# Patient Record
Sex: FEMALE | Race: ASIAN | NOT HISPANIC OR LATINO | URBAN - METROPOLITAN AREA
[De-identification: names, ages, dates, MRNs, and addresses within clinical notes are randomized per-mention and may not be internally consistent; named-entity substitution may affect disease eponyms.]

---

## 2017-01-17 ENCOUNTER — INPATIENT (INPATIENT)
Facility: HOSPITAL | Age: 57
LOS: 9 days | Discharge: HOME | DRG: 603 | End: 2017-01-27
Attending: HOSPITALIST | Admitting: HOSPITALIST
Payer: MEDICAID

## 2017-01-17 VITALS
HEART RATE: 82 BPM | RESPIRATION RATE: 18 BRPM | TEMPERATURE: 98 F | OXYGEN SATURATION: 95 % | DIASTOLIC BLOOD PRESSURE: 84 MMHG | SYSTOLIC BLOOD PRESSURE: 129 MMHG

## 2017-01-17 DIAGNOSIS — L03.115 CELLULITIS OF RIGHT LOWER LIMB: ICD-10-CM

## 2017-01-17 LAB
ALBUMIN SERPL ELPH-MCNC: 3.5 G/DL — SIGNIFICANT CHANGE UP (ref 3.3–5)
ALP SERPL-CCNC: 187 U/L — HIGH (ref 40–120)
ALT FLD-CCNC: 9 U/L RC — LOW (ref 10–45)
ANION GAP SERPL CALC-SCNC: 14 MMOL/L — SIGNIFICANT CHANGE UP (ref 5–17)
ANION GAP SERPL CALC-SCNC: 15 MMOL/L — SIGNIFICANT CHANGE UP (ref 5–17)
AST SERPL-CCNC: 13 U/L — SIGNIFICANT CHANGE UP (ref 10–40)
BASE EXCESS BLDV CALC-SCNC: -6.1 MMOL/L — LOW (ref -2–2)
BASOPHILS # BLD AUTO: 0.1 K/UL — SIGNIFICANT CHANGE UP (ref 0–0.2)
BASOPHILS NFR BLD AUTO: 1 % — SIGNIFICANT CHANGE UP (ref 0–2)
BILIRUB SERPL-MCNC: 0.8 MG/DL — SIGNIFICANT CHANGE UP (ref 0.2–1.2)
BUN SERPL-MCNC: 49 MG/DL — HIGH (ref 7–23)
BUN SERPL-MCNC: 49 MG/DL — HIGH (ref 7–23)
CA-I SERPL-SCNC: 1.16 MMOL/L — SIGNIFICANT CHANGE UP (ref 1.12–1.3)
CALCIUM SERPL-MCNC: 8.4 MG/DL — SIGNIFICANT CHANGE UP (ref 8.4–10.5)
CALCIUM SERPL-MCNC: 8.5 MG/DL — SIGNIFICANT CHANGE UP (ref 8.4–10.5)
CHLORIDE BLDV-SCNC: 113 MMOL/L — HIGH (ref 96–108)
CHLORIDE SERPL-SCNC: 105 MMOL/L — SIGNIFICANT CHANGE UP (ref 96–108)
CHLORIDE SERPL-SCNC: 108 MMOL/L — SIGNIFICANT CHANGE UP (ref 96–108)
CO2 BLDV-SCNC: 22 MMOL/L — SIGNIFICANT CHANGE UP (ref 22–30)
CO2 SERPL-SCNC: 17 MMOL/L — LOW (ref 22–31)
CO2 SERPL-SCNC: 19 MMOL/L — LOW (ref 22–31)
CREAT SERPL-MCNC: 3.38 MG/DL — HIGH (ref 0.5–1.3)
CREAT SERPL-MCNC: 3.46 MG/DL — HIGH (ref 0.5–1.3)
EOSINOPHIL # BLD AUTO: 0.4 K/UL — SIGNIFICANT CHANGE UP (ref 0–0.5)
EOSINOPHIL NFR BLD AUTO: 4 % — SIGNIFICANT CHANGE UP (ref 0–6)
GAS PNL BLDV: 135 MMOL/L — LOW (ref 136–145)
GAS PNL BLDV: SIGNIFICANT CHANGE UP
GLUCOSE BLDV-MCNC: 117 MG/DL — HIGH (ref 70–99)
GLUCOSE SERPL-MCNC: 117 MG/DL — HIGH (ref 70–99)
GLUCOSE SERPL-MCNC: 155 MG/DL — HIGH (ref 70–99)
HCO3 BLDV-SCNC: 21 MMOL/L — SIGNIFICANT CHANGE UP (ref 21–29)
HCT VFR BLD CALC: 47 % — HIGH (ref 34.5–45)
HCT VFR BLDA CALC: 47 % — SIGNIFICANT CHANGE UP (ref 39–50)
HGB BLD CALC-MCNC: 15.2 G/DL — SIGNIFICANT CHANGE UP (ref 11.5–15.5)
HGB BLD-MCNC: 15.2 G/DL — SIGNIFICANT CHANGE UP (ref 11.5–15.5)
LACTATE BLDV-MCNC: 1 MMOL/L — SIGNIFICANT CHANGE UP (ref 0.7–2)
LYMPHOCYTES # BLD AUTO: 1.4 K/UL — SIGNIFICANT CHANGE UP (ref 1–3.3)
LYMPHOCYTES # BLD AUTO: 8 % — LOW (ref 13–44)
MCHC RBC-ENTMCNC: 22.9 PG — LOW (ref 27–34)
MCHC RBC-ENTMCNC: 32.2 GM/DL — SIGNIFICANT CHANGE UP (ref 32–36)
MCV RBC AUTO: 71 FL — LOW (ref 80–100)
MONOCYTES # BLD AUTO: 0.8 K/UL — SIGNIFICANT CHANGE UP (ref 0–0.9)
MONOCYTES NFR BLD AUTO: 3 % — SIGNIFICANT CHANGE UP (ref 2–14)
NEUTROPHILS # BLD AUTO: 21.5 K/UL — HIGH (ref 1.8–7.4)
NEUTROPHILS NFR BLD AUTO: 83 % — HIGH (ref 43–77)
PCO2 BLDV: 48 MMHG — SIGNIFICANT CHANGE UP (ref 35–50)
PH BLDV: 7.26 — LOW (ref 7.35–7.45)
PLATELET # BLD AUTO: 566 K/UL — HIGH (ref 150–400)
PO2 BLDV: 22 MMHG — LOW (ref 25–45)
POTASSIUM BLDV-SCNC: 4.9 MMOL/L — SIGNIFICANT CHANGE UP (ref 3.5–5)
POTASSIUM SERPL-MCNC: 5.3 MMOL/L — SIGNIFICANT CHANGE UP (ref 3.5–5.3)
POTASSIUM SERPL-MCNC: 6.3 MMOL/L — CRITICAL HIGH (ref 3.5–5.3)
POTASSIUM SERPL-SCNC: 5.3 MMOL/L — SIGNIFICANT CHANGE UP (ref 3.5–5.3)
POTASSIUM SERPL-SCNC: 6.3 MMOL/L — CRITICAL HIGH (ref 3.5–5.3)
PROT SERPL-MCNC: 7.2 G/DL — SIGNIFICANT CHANGE UP (ref 6–8.3)
RBC # BLD: 6.63 M/UL — HIGH (ref 3.8–5.2)
RBC # FLD: 20.9 % — HIGH (ref 10.3–14.5)
SAO2 % BLDV: 27 % — LOW (ref 67–88)
SODIUM SERPL-SCNC: 139 MMOL/L — SIGNIFICANT CHANGE UP (ref 135–145)
SODIUM SERPL-SCNC: 139 MMOL/L — SIGNIFICANT CHANGE UP (ref 135–145)
WBC # BLD: 24.2 K/UL — HIGH (ref 3.8–10.5)
WBC # FLD AUTO: 24.2 K/UL — HIGH (ref 3.8–10.5)

## 2017-01-17 PROCEDURE — 99285 EMERGENCY DEPT VISIT HI MDM: CPT | Mod: 25

## 2017-01-17 PROCEDURE — 93971 EXTREMITY STUDY: CPT | Mod: 26

## 2017-01-17 PROCEDURE — 93010 ELECTROCARDIOGRAM REPORT: CPT

## 2017-01-17 PROCEDURE — 99223 1ST HOSP IP/OBS HIGH 75: CPT

## 2017-01-17 RX ORDER — CEFAZOLIN SODIUM 1 G
1000 VIAL (EA) INJECTION ONCE
Qty: 0 | Refills: 0 | Status: COMPLETED | OUTPATIENT
Start: 2017-01-17 | End: 2017-01-17

## 2017-01-17 RX ORDER — DEXTROSE 50 % IN WATER 50 %
50 SYRINGE (ML) INTRAVENOUS ONCE
Qty: 0 | Refills: 0 | Status: DISCONTINUED | OUTPATIENT
Start: 2017-01-17 | End: 2017-01-17

## 2017-01-17 RX ORDER — FUROSEMIDE 40 MG
40 TABLET ORAL ONCE
Qty: 0 | Refills: 0 | Status: DISCONTINUED | OUTPATIENT
Start: 2017-01-17 | End: 2017-01-17

## 2017-01-17 RX ORDER — ALBUTEROL 90 UG/1
10 AEROSOL, METERED ORAL ONCE
Qty: 0 | Refills: 0 | Status: DISCONTINUED | OUTPATIENT
Start: 2017-01-17 | End: 2017-01-17

## 2017-01-17 RX ORDER — INSULIN HUMAN 100 [IU]/ML
10 INJECTION, SOLUTION SUBCUTANEOUS ONCE
Qty: 0 | Refills: 0 | Status: DISCONTINUED | OUTPATIENT
Start: 2017-01-17 | End: 2017-01-17

## 2017-01-17 RX ORDER — SODIUM CHLORIDE 9 MG/ML
1000 INJECTION INTRAMUSCULAR; INTRAVENOUS; SUBCUTANEOUS ONCE
Qty: 0 | Refills: 0 | Status: COMPLETED | OUTPATIENT
Start: 2017-01-17 | End: 2017-01-17

## 2017-01-17 RX ORDER — IBUPROFEN 200 MG
600 TABLET ORAL ONCE
Qty: 0 | Refills: 0 | Status: COMPLETED | OUTPATIENT
Start: 2017-01-17 | End: 2017-01-17

## 2017-01-17 RX ADMIN — Medication 100 MILLIGRAM(S): at 21:24

## 2017-01-17 RX ADMIN — Medication 600 MILLIGRAM(S): at 21:24

## 2017-01-17 RX ADMIN — SODIUM CHLORIDE 2000 MILLILITER(S): 9 INJECTION INTRAMUSCULAR; INTRAVENOUS; SUBCUTANEOUS at 22:49

## 2017-01-17 NOTE — ED ADULT NURSE NOTE - PMH
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

## 2017-01-17 NOTE — H&P ADULT. - FAMILY HISTORY
Mother  Still living? No  Family history of hypertension in mother, Age at diagnosis: Age Unknown     Grandparent  Still living? No  Family history of malignant neoplasm, Age at diagnosis: Age Unknown

## 2017-01-17 NOTE — ED PROVIDER NOTE - PROGRESS NOTE DETAILS
MIRIAN, elevated K, will check EKG and tx hyperkalemia. admit to hospital, pending call from PCP -Rodo CARLTON

## 2017-01-17 NOTE — H&P ADULT. - RS GEN PE MLT RESP DETAILS PC
breath sounds equal/no rales/good air movement/respirations non-labored/clear to auscultation bilaterally/airway patent/no wheezes

## 2017-01-17 NOTE — ED ADULT NURSE NOTE - OBJECTIVE STATEMENT
56 y.o female presents to the ed with right lower extremity swelling. noticed it on saturday worsening over the next few days. believes she had a spider bite but unsure. leg is warm to the touch and red, circulation intact. takes aspirin daily. patient is A&Ox4 and is able to ambulate. c.o 10/10 pain at the site

## 2017-01-17 NOTE — ED PROVIDER NOTE - PHYSICAL EXAMINATION
Gen: NAD, AOx3  Head: NCAT  HEENT: PERRL, oral mucosa moist, normal conjunctiva, neck supple  Lung: CTAB, no respiratory distress  CV: rrr, no murmur, Normal perfusion  Abd: soft, NTND, no CVA tenderness  MSK: swelling of rt LE with erythema medial border calf from ankle to knee not circumferential, +increased warmth over area, no swelling of thigh, no streaking up leg. central area of petechia, no oral lesions, no visible deformities  Neuro: No focal neurologic deficits  Skin: see MSK  Psych: normal affect

## 2017-01-17 NOTE — H&P ADULT. - PROBLEM SELECTOR PLAN 2
- cont IVF hydration  - renal consult in AM  - pt has no nephrologist and needs renal followup as outpt with fellow clinic as she has no insurance  - will send urine studies - cont IVF hydration  - renal consult in AM  - pt has no nephrologist and needs renal followup as outpt with fellow clinic as she has no insurance. provided general outpt appointment number to pt.  - will send urine studies  - pt received ibuprofen in ED  - hold all nephrotoxins and hold home HCTZ

## 2017-01-17 NOTE — ED PROVIDER NOTE - CARE PLAN
Principal Discharge DX:	Cellulitis of right lower extremity Principal Discharge DX:	Cellulitis of right lower extremity  Secondary Diagnosis:	Acute kidney injury

## 2017-01-17 NOTE — ED PROVIDER NOTE - ATTENDING CONTRIBUTION TO CARE
Private Physician Ramirez  56y female pmh P.Vera, Htn, Splenomegalia . No dm pt comes to ed complains of painful swelling to rle past 3 day with tactile temp with diff amublating. PE WDWN Female awake alert speech fluent chest clear anterior & posterior. abd soft rt calf warm red tender cw celluits. Deangelo Hart MD, Facep

## 2017-01-17 NOTE — H&P ADULT. - PROBLEM SELECTOR PLAN 1
- low k diet  - kayexalate  - no concerning EKG changes - low k diet  - kayexalate  - no concerning EKG changes  - trend potassium

## 2017-01-17 NOTE — H&P ADULT. - HISTORY OF PRESENT ILLNESS
56F c hx HTN, PCV on hydrea c/b splenomegaly/splenic infarcts, CKDIV, PUD, grade 1 esophageal varices, undocumented, 56F bilingual, c hx HTN, PCV on hydrea c/b splenomegaly/splenic infarcts, CKDIV, PUD, grade 1 esophageal varices, 1.4cm pancreatic mass, undocumented, p/w 4 day hx worsening right calf erythema.    Pt states that starting starting, she had a spot on her right calf that slowly and gradually grew larger, erythematous, and painful. Today, she was unable to ambulate 2/2 pain. She reports subjective fevers, but checked only once at it was 99 degrees. She has never had anything like this before.    Reports intermittent palpitations. Reports ~20 pound weight loss over the past 1 year. Reports having URI symptoms 3 weeks prior. Denies chest pain, SOB, diarrhea, N/V.    VS: Tm 98.4, P 82, /84, R 18, >95% RA  In the ED, received cefazolin, NS 1L, ibuprofen.

## 2017-01-17 NOTE — ED PROVIDER NOTE - MEDICAL DECISION MAKING DETAILS
cellulitis, otherwise healthy, questionable history of splenic infarct but not tx as asplenic as far as patient knows. will check basic labs, US, ancef then CDU D/C on keflex

## 2017-01-17 NOTE — ED PROVIDER NOTE - OBJECTIVE STATEMENT
57yo F with HTN, PV on hydroxyurea with rt calf redness/swelling/pain since saturday, started with 'bug bite' no pustular drainage, progressive redness/swelling to encompass medial side of calf. unable to walk due to pain. +fever daily to 99. no chills. no CP/SOB. no h/o DVT. has 'kidney problems' no recent travel. no hiking. never had cellulitis before.     PMH- HTN, PV  PCP- Dr. Guzmán

## 2017-01-17 NOTE — H&P ADULT. - SKIN COMMENTS
8cm x 16cm area of erythema on medial side of right calf extending down to medial plantar surface of right foot

## 2017-01-17 NOTE — H&P ADULT. - ASSESSMENT
56F bilingual, c hx HTN, PCV on hydrea c/b splenomegaly/splenic infarcts, CKDIV, PUD, grade 1 esophageal varices, 1.4cm pancreatic mass, undocumented, p/w cellulitis, found to have MIRIAN and hyperkalemia.

## 2017-01-17 NOTE — ED ADULT NURSE REASSESSMENT NOTE - NS ED NURSE REASSESS COMMENT FT1
repeat blood work sent. patient aware of plan of care. vital signs stable family aware of plan of care.

## 2017-01-18 DIAGNOSIS — I10 ESSENTIAL (PRIMARY) HYPERTENSION: ICD-10-CM

## 2017-01-18 DIAGNOSIS — D72.828 OTHER ELEVATED WHITE BLOOD CELL COUNT: ICD-10-CM

## 2017-01-18 DIAGNOSIS — N17.9 ACUTE KIDNEY FAILURE, UNSPECIFIED: ICD-10-CM

## 2017-01-18 DIAGNOSIS — E87.5 HYPERKALEMIA: ICD-10-CM

## 2017-01-18 DIAGNOSIS — L03.115 CELLULITIS OF RIGHT LOWER LIMB: ICD-10-CM

## 2017-01-18 DIAGNOSIS — K86.9 DISEASE OF PANCREAS, UNSPECIFIED: ICD-10-CM

## 2017-01-18 LAB
ALBUMIN SERPL ELPH-MCNC: 3.1 G/DL — LOW (ref 3.3–5)
ALP SERPL-CCNC: 163 U/L — HIGH (ref 40–120)
ALT FLD-CCNC: 8 U/L RC — LOW (ref 10–45)
ANION GAP SERPL CALC-SCNC: 14 MMOL/L — SIGNIFICANT CHANGE UP (ref 5–17)
APPEARANCE UR: CLEAR — SIGNIFICANT CHANGE UP
AST SERPL-CCNC: 14 U/L — SIGNIFICANT CHANGE UP (ref 10–40)
BILIRUB SERPL-MCNC: 0.7 MG/DL — SIGNIFICANT CHANGE UP (ref 0.2–1.2)
BILIRUB UR-MCNC: NEGATIVE — SIGNIFICANT CHANGE UP
BUN SERPL-MCNC: 48 MG/DL — HIGH (ref 7–23)
CALCIUM SERPL-MCNC: 7.8 MG/DL — LOW (ref 8.4–10.5)
CHLORIDE SERPL-SCNC: 111 MMOL/L — HIGH (ref 96–108)
CO2 SERPL-SCNC: 16 MMOL/L — LOW (ref 22–31)
COLOR SPEC: YELLOW — SIGNIFICANT CHANGE UP
CREAT ?TM UR-MCNC: 28 MG/DL — SIGNIFICANT CHANGE UP
CREAT SERPL-MCNC: 3.21 MG/DL — HIGH (ref 0.5–1.3)
DIFF PNL FLD: ABNORMAL
GLUCOSE SERPL-MCNC: 92 MG/DL — SIGNIFICANT CHANGE UP (ref 70–99)
GLUCOSE UR QL: NEGATIVE MG/DL — SIGNIFICANT CHANGE UP
HBA1C BLD-MCNC: 5.7 % — HIGH (ref 4–5.6)
HCT VFR BLD CALC: 43.7 % — SIGNIFICANT CHANGE UP (ref 34.5–45)
HGB BLD-MCNC: 13.6 G/DL — SIGNIFICANT CHANGE UP (ref 11.5–15.5)
KETONES UR-MCNC: NEGATIVE — SIGNIFICANT CHANGE UP
LEUKOCYTE ESTERASE UR-ACNC: NEGATIVE — SIGNIFICANT CHANGE UP
MAGNESIUM SERPL-MCNC: 1.8 MG/DL — SIGNIFICANT CHANGE UP (ref 1.6–2.6)
MCHC RBC-ENTMCNC: 21.7 PG — LOW (ref 27–34)
MCHC RBC-ENTMCNC: 31 GM/DL — LOW (ref 32–36)
MCV RBC AUTO: 70.1 FL — LOW (ref 80–100)
NITRITE UR-MCNC: NEGATIVE — SIGNIFICANT CHANGE UP
PH UR: 6 — SIGNIFICANT CHANGE UP (ref 5–8)
PHOSPHATE SERPL-MCNC: 4.7 MG/DL — HIGH (ref 2.5–4.5)
PLATELET # BLD AUTO: 465 K/UL — HIGH (ref 150–400)
POTASSIUM SERPL-MCNC: 4.6 MMOL/L — SIGNIFICANT CHANGE UP (ref 3.5–5.3)
POTASSIUM SERPL-SCNC: 4.6 MMOL/L — SIGNIFICANT CHANGE UP (ref 3.5–5.3)
PROT ?TM UR-MCNC: 187 MG/DL — HIGH (ref 0–12)
PROT SERPL-MCNC: 6.1 G/DL — SIGNIFICANT CHANGE UP (ref 6–8.3)
PROT UR-MCNC: 100 MG/DL
RBC # BLD: 6.23 M/UL — HIGH (ref 3.8–5.2)
RBC # FLD: 20.2 % — HIGH (ref 10.3–14.5)
SODIUM SERPL-SCNC: 141 MMOL/L — SIGNIFICANT CHANGE UP (ref 135–145)
SODIUM UR-SCNC: 134 MMOL/L — SIGNIFICANT CHANGE UP
SP GR SPEC: 1.01 — SIGNIFICANT CHANGE UP (ref 1.01–1.02)
TSH SERPL-MCNC: 1.15 UIU/ML — SIGNIFICANT CHANGE UP (ref 0.27–4.2)
UROBILINOGEN FLD QL: NEGATIVE MG/DL — SIGNIFICANT CHANGE UP
UUN UR-MCNC: 245 MG/DL — SIGNIFICANT CHANGE UP
WBC # BLD: 18.2 K/UL — HIGH (ref 3.8–10.5)
WBC # FLD AUTO: 18.2 K/UL — HIGH (ref 3.8–10.5)

## 2017-01-18 PROCEDURE — 99233 SBSQ HOSP IP/OBS HIGH 50: CPT

## 2017-01-18 RX ORDER — CEFAZOLIN SODIUM 1 G
1000 VIAL (EA) INJECTION EVERY 8 HOURS
Qty: 0 | Refills: 0 | Status: DISCONTINUED | OUTPATIENT
Start: 2017-01-18 | End: 2017-01-18

## 2017-01-18 RX ORDER — IBUPROFEN 200 MG
600 TABLET ORAL ONCE
Qty: 0 | Refills: 0 | Status: COMPLETED | OUTPATIENT
Start: 2017-01-18 | End: 2017-01-18

## 2017-01-18 RX ORDER — DIPHENHYDRAMINE HCL 50 MG
25 CAPSULE ORAL EVERY 6 HOURS
Qty: 0 | Refills: 0 | Status: DISCONTINUED | OUTPATIENT
Start: 2017-01-18 | End: 2017-01-25

## 2017-01-18 RX ORDER — SODIUM CHLORIDE 9 MG/ML
1000 INJECTION INTRAMUSCULAR; INTRAVENOUS; SUBCUTANEOUS
Qty: 0 | Refills: 0 | Status: DISCONTINUED | OUTPATIENT
Start: 2017-01-18 | End: 2017-01-19

## 2017-01-18 RX ORDER — SODIUM POLYSTYRENE SULFONATE 4.1 MEQ/G
15 POWDER, FOR SUSPENSION ORAL ONCE
Qty: 0 | Refills: 0 | Status: COMPLETED | OUTPATIENT
Start: 2017-01-18 | End: 2017-01-18

## 2017-01-18 RX ORDER — CEFAZOLIN SODIUM 1 G
500 VIAL (EA) INJECTION EVERY 12 HOURS
Qty: 0 | Refills: 0 | Status: DISCONTINUED | OUTPATIENT
Start: 2017-01-18 | End: 2017-01-20

## 2017-01-18 RX ORDER — SODIUM BICARBONATE 1 MEQ/ML
1300 SYRINGE (ML) INTRAVENOUS THREE TIMES A DAY
Qty: 0 | Refills: 0 | Status: DISCONTINUED | OUTPATIENT
Start: 2017-01-18 | End: 2017-01-27

## 2017-01-18 RX ORDER — DOCUSATE SODIUM 100 MG
100 CAPSULE ORAL THREE TIMES A DAY
Qty: 0 | Refills: 0 | Status: DISCONTINUED | OUTPATIENT
Start: 2017-01-18 | End: 2017-01-27

## 2017-01-18 RX ORDER — SENNA PLUS 8.6 MG/1
2 TABLET ORAL AT BEDTIME
Qty: 0 | Refills: 0 | Status: DISCONTINUED | OUTPATIENT
Start: 2017-01-18 | End: 2017-01-27

## 2017-01-18 RX ORDER — HEPARIN SODIUM 5000 [USP'U]/ML
5000 INJECTION INTRAVENOUS; SUBCUTANEOUS EVERY 8 HOURS
Qty: 0 | Refills: 0 | Status: DISCONTINUED | OUTPATIENT
Start: 2017-01-18 | End: 2017-01-22

## 2017-01-18 RX ORDER — HYDROXYUREA 500 MG/1
500 CAPSULE ORAL EVERY OTHER DAY
Qty: 0 | Refills: 0 | Status: DISCONTINUED | OUTPATIENT
Start: 2017-01-18 | End: 2017-01-20

## 2017-01-18 RX ADMIN — SODIUM POLYSTYRENE SULFONATE 15 GRAM(S): 4.1 POWDER, FOR SUSPENSION ORAL at 02:24

## 2017-01-18 RX ADMIN — Medication 25 MILLIGRAM(S): at 02:06

## 2017-01-18 RX ADMIN — Medication 100 MILLIGRAM(S): at 21:23

## 2017-01-18 RX ADMIN — Medication 600 MILLIGRAM(S): at 04:22

## 2017-01-18 RX ADMIN — Medication 100 MILLIGRAM(S): at 17:08

## 2017-01-18 RX ADMIN — Medication 100 MILLIGRAM(S): at 05:56

## 2017-01-18 RX ADMIN — HEPARIN SODIUM 5000 UNIT(S): 5000 INJECTION INTRAVENOUS; SUBCUTANEOUS at 05:55

## 2017-01-18 RX ADMIN — HEPARIN SODIUM 5000 UNIT(S): 5000 INJECTION INTRAVENOUS; SUBCUTANEOUS at 21:23

## 2017-01-18 RX ADMIN — HEPARIN SODIUM 5000 UNIT(S): 5000 INJECTION INTRAVENOUS; SUBCUTANEOUS at 13:08

## 2017-01-18 RX ADMIN — Medication 100 MILLIGRAM(S): at 13:08

## 2017-01-18 RX ADMIN — HYDROXYUREA 500 MILLIGRAM(S): 500 CAPSULE ORAL at 11:51

## 2017-01-18 RX ADMIN — Medication 600 MILLIGRAM(S): at 03:52

## 2017-01-19 ENCOUNTER — TRANSCRIPTION ENCOUNTER (OUTPATIENT)
Age: 57
End: 2017-01-19

## 2017-01-19 LAB
ANION GAP SERPL CALC-SCNC: 15 MMOL/L — SIGNIFICANT CHANGE UP (ref 5–17)
BUN SERPL-MCNC: 42 MG/DL — HIGH (ref 7–23)
CALCIUM SERPL-MCNC: 8.1 MG/DL — LOW (ref 8.4–10.5)
CHLORIDE SERPL-SCNC: 110 MMOL/L — HIGH (ref 96–108)
CO2 SERPL-SCNC: 14 MMOL/L — LOW (ref 22–31)
CREAT SERPL-MCNC: 3.06 MG/DL — HIGH (ref 0.5–1.3)
GLUCOSE SERPL-MCNC: 87 MG/DL — SIGNIFICANT CHANGE UP (ref 70–99)
HCT VFR BLD CALC: 45 % — SIGNIFICANT CHANGE UP (ref 34.5–45)
HGB BLD-MCNC: 13.7 G/DL — SIGNIFICANT CHANGE UP (ref 11.5–15.5)
MAGNESIUM SERPL-MCNC: 1.7 MG/DL — SIGNIFICANT CHANGE UP (ref 1.6–2.6)
MCHC RBC-ENTMCNC: 21.4 PG — LOW (ref 27–34)
MCHC RBC-ENTMCNC: 30.4 GM/DL — LOW (ref 32–36)
MCV RBC AUTO: 70.4 FL — LOW (ref 80–100)
PLATELET # BLD AUTO: 314 K/UL — SIGNIFICANT CHANGE UP (ref 150–400)
POTASSIUM SERPL-MCNC: 5.3 MMOL/L — SIGNIFICANT CHANGE UP (ref 3.5–5.3)
POTASSIUM SERPL-SCNC: 5.3 MMOL/L — SIGNIFICANT CHANGE UP (ref 3.5–5.3)
RBC # BLD: 6.39 M/UL — HIGH (ref 3.8–5.2)
RBC # FLD: 21 % — HIGH (ref 10.3–14.5)
SODIUM SERPL-SCNC: 139 MMOL/L — SIGNIFICANT CHANGE UP (ref 135–145)
WBC # BLD: 14.76 K/UL — HIGH (ref 3.8–10.5)
WBC # FLD AUTO: 14.76 K/UL — HIGH (ref 3.8–10.5)

## 2017-01-19 PROCEDURE — 99233 SBSQ HOSP IP/OBS HIGH 50: CPT

## 2017-01-19 PROCEDURE — 99223 1ST HOSP IP/OBS HIGH 75: CPT | Mod: GC

## 2017-01-19 RX ORDER — SODIUM BICARBONATE 1 MEQ/ML
2 SYRINGE (ML) INTRAVENOUS
Qty: 84 | Refills: 0 | COMMUNITY
Start: 2017-01-19 | End: 2017-02-02

## 2017-01-19 RX ORDER — AMLODIPINE BESYLATE 2.5 MG/1
10 TABLET ORAL DAILY
Qty: 0 | Refills: 0 | Status: DISCONTINUED | OUTPATIENT
Start: 2017-01-19 | End: 2017-01-27

## 2017-01-19 RX ORDER — SODIUM BICARBONATE 1 MEQ/ML
2 SYRINGE (ML) INTRAVENOUS
Qty: 84 | Refills: 0 | OUTPATIENT
Start: 2017-01-19 | End: 2017-02-02

## 2017-01-19 RX ORDER — CEPHALEXIN 500 MG
1 CAPSULE ORAL
Qty: 10 | Refills: 0 | OUTPATIENT
Start: 2017-01-19 | End: 2017-01-24

## 2017-01-19 RX ADMIN — HEPARIN SODIUM 5000 UNIT(S): 5000 INJECTION INTRAVENOUS; SUBCUTANEOUS at 13:52

## 2017-01-19 RX ADMIN — HEPARIN SODIUM 5000 UNIT(S): 5000 INJECTION INTRAVENOUS; SUBCUTANEOUS at 05:30

## 2017-01-19 RX ADMIN — Medication 100 MILLIGRAM(S): at 21:40

## 2017-01-19 RX ADMIN — Medication 100 MILLIGRAM(S): at 13:52

## 2017-01-19 RX ADMIN — Medication 100 MILLIGRAM(S): at 21:41

## 2017-01-19 RX ADMIN — Medication 100 MILLIGRAM(S): at 05:28

## 2017-01-19 RX ADMIN — Medication 100 MILLIGRAM(S): at 05:53

## 2017-01-19 RX ADMIN — Medication 1300 MILLIGRAM(S): at 21:41

## 2017-01-19 RX ADMIN — Medication 1300 MILLIGRAM(S): at 13:52

## 2017-01-19 RX ADMIN — HEPARIN SODIUM 5000 UNIT(S): 5000 INJECTION INTRAVENOUS; SUBCUTANEOUS at 21:41

## 2017-01-19 RX ADMIN — Medication 1300 MILLIGRAM(S): at 05:28

## 2017-01-19 NOTE — DISCHARGE NOTE ADULT - HOSPITAL COURSE
Patient admitted with RLE cellulitis, treated with cefazolin. She will complete 7 days of antibiotics, will discharge on Keflex.  Patient initially with MIRIAN on CKD, complicated by hyperkalemia. Seen by nephrologist started on sodium bicarb, needs out-pt workup for CKD. MIRIAN improved with IVF. Patient admitted with RLE cellulitis, treated with cefazolin/keflex. She completed 6 days of antibiotics, with resolution of cellulitis.  Patient initially with MIRIAN on CKD, complicated by hyperkalemia. Seen by nephrologist started on sodium bicarb for metabolic acidosis.  She had renal work-up including renal US (Both kidneys are echogenic and small, suggesting chronic renal disease. No evidence of SU in right, left not well visualized). Renal biopsy on 1/26 - needs to follow up results with nephrology. Other lab test were normal including neg HCV, HBV, HIV, RPR, cANCA, pANCA, RF, SSA/SSB, C3/C4, Antistreptolysin, glomerular basement membrane Ab, SPEP. Only RAHUL positive 1:80, questionable significance.   Patient has an appointment on 2/2 at general medicine clinic. She needs referral to nephrology clinic to follow up results of renal biospy.

## 2017-01-19 NOTE — DISCHARGE NOTE ADULT - CARE PROVIDER_API CALL
Hollie ojeda  Phone: (   )    -  Fax: (   )    - Hollie ojeda  PMD  Phone: (   )    -  Fax: (   )    -    renal clinic,   Phone: (   )    -  Fax: (   )    - Hollie ojeda  PMD  Phone: (   )    -  Fax: (   )    -    [facility],   [address] [place] [address]  Appt 2/2 @2:30 PM  Phone: [phone]  Fax: (   )    -    Renal clinic,   Phone: [phone]  Fax: (   )    - Hollie ojeda  PMD  Phone: (   )    -  Fax: (   )    -    [facility],   [address] [place] [address]  Appt 2/2 @2:30 PM  Phone: [phone]  Fax: (   )    -    Azucena De León), Internal Medicine; Nephrology  [address] [place] [address]  Phone: [phone]  Fax: [phone]

## 2017-01-19 NOTE — DISCHARGE NOTE ADULT - MEDICATION SUMMARY - MEDICATIONS TO TAKE
I will START or STAY ON the medications listed below when I get home from the hospital:    aspirin 81 mg oral tablet  -- 1 tab(s) by mouth once a day  -- Indication: For PCV    sodium bicarbonate 650 mg oral tablet  -- 2 tab(s) by mouth 3 times a day  -- Indication: For CKD    hydroxyurea  -- 1 tab(s) by mouth every other day  -- Indication: For PVC    Bystolic 5 mg oral tablet  -- 1 tab(s) by mouth once a day  -- Indication: For HTN    amLODIPine 10 mg oral tablet  -- 1 tab(s) by mouth once a day  -- Indication: For HTN    cephalexin 500 mg oral tablet  -- 1 tab(s) by mouth 2 times a day  -- Finish all this medication unless otherwise directed by prescriber.    -- Indication: For Cellulitis    hydroCHLOROthiazide 25 mg oral tablet  -- 2 tab(s) by mouth once a day  -- Indication: For HTN    NexIUM 20 mg oral delayed release capsule  -- 1 cap(s) by mouth once a day  -- Indication: For GERD I will START or STAY ON the medications listed below when I get home from the hospital:    sodium bicarbonate 650 mg oral tablet  -- 2 tab(s) by mouth 3 times a day  -- Indication: For CKD    amLODIPine 10 mg oral tablet  -- 1 tab(s) by mouth once a day  -- Indication: For HTN

## 2017-01-19 NOTE — DISCHARGE NOTE ADULT - CARE PROVIDERS DIRECT ADDRESSES
,DirectAddress_Unknown,DirectAddress_Unknown ,DirectAddress_Unknown,DirectAddress_Unknown,DirectAddress_Unknown ,DirectAddress_Unknown,DirectAddress_Unknown,DirectAddress_Unknown,DirectAddress_Unknown ,DirectAddress_Unknown,DirectAddress_Unknown,[email or web address],DirectAddress_Unknown

## 2017-01-19 NOTE — DISCHARGE NOTE ADULT - MEDICATION SUMMARY - MEDICATIONS TO STOP TAKING
I will STOP taking the medications listed below when I get home from the hospital:  None I will STOP taking the medications listed below when I get home from the hospital:    aspirin 81 mg oral tablet  -- 1 tab(s) by mouth once a day    NexIUM 20 mg oral delayed release capsule  -- 1 cap(s) by mouth once a day    hydroxyurea  -- 1 tab(s) by mouth every other day    labetalol 200 mg oral tablet  -- 1 tab(s) by mouth 2 times a day    Bystolic 5 mg oral tablet  -- 1 tab(s) by mouth once a day    hydroCHLOROthiazide 25 mg oral tablet  -- 2 tab(s) by mouth once a day

## 2017-01-19 NOTE — DISCHARGE NOTE ADULT - PROVIDER TOKENS
FREE:[LAST:[ojeda],FIRST:[Hollie palma],PHONE:[(   )    -],FAX:[(   )    -],ADDRESS:[PMD]] FREE:[LAST:[ojeda],FIRST:[Hollie palma],PHONE:[(   )    -],FAX:[(   )    -],ADDRESS:[PMD]],FREE:[LAST:[renal clinic],PHONE:[(   )    -],FAX:[(   )    -]] FREE:[LAST:[ojeda],FIRST:[Hollie palma],PHONE:[(   )    -],FAX:[(   )    -],ADDRESS:[[facility]]],FREE:[LAST:[[facility]],PHONE:[phone]],FAX:[(   )    -],ADDRESS:[[address] [place] [address]  Appt 2/2 @2:30 PM]],FREE:[LAST:[Renal clinic],PHONE:[phone]],FAX:[(   )    -]] FREE:[LAST:[ojeda],FIRST:[Hollie palma],PHONE:[(   )    -],FAX:[(   )    -],ADDRESS:[[facility]]],FREE:[LAST:[[facility]],PHONE:[phone]:[(   )    -],ADDRESS:[[address] [place] [address]  Appt 2/2 @2:30 PM]],TOKEN:'[id number]'

## 2017-01-19 NOTE — DISCHARGE NOTE ADULT - ADDITIONAL INSTRUCTIONS
Please bring all medications and discharge paperwork , including medication list to your next MD visit.  f/u with renal clinic as scheduled  next week  check BMP within 1 week  f/u with PCP within 1 week  f/u with heme/onc in 1-2 weeks Please bring all medications and discharge paperwork , including medication list to your next MD visit.  f/u with renal clinic as scheduled  next week Appt 2/2 @2:30 PM. f/u for results of renal biopsy  check BMP within 1 week  f/u with PCP within 1 week  f/u with heme/onc in 1-2 weeks

## 2017-01-19 NOTE — DISCHARGE NOTE ADULT - SECONDARY DIAGNOSIS.
MIRIAN (acute kidney injury) Stage 4 chronic kidney disease Essential hypertension Hyperkalemia Polycythemia vera

## 2017-01-19 NOTE — DISCHARGE NOTE ADULT - PATIENT PORTAL LINK FT
“You can access the [facility] Patient Portal, offered by [facility], by registering with the following website: [email or web address]”

## 2017-01-19 NOTE — DISCHARGE NOTE ADULT - CARE PLAN
Principal Discharge DX:	Cellulitis of right lower extremity  Goal:	Take all your prescribed medications  Instructions for follow-up, activity and diet:	Take Keflex for 5 more days.   Follow up with your PMD within 1 week.  Secondary Diagnosis:	MIRIAN (acute kidney injury)  Secondary Diagnosis:	Stage 4 chronic kidney disease  Instructions for follow-up, activity and diet:	You need to be seen by kidney specialiast. Please make an appt.  Start taking sodium bicarb.  Avoid NSAIDs  Secondary Diagnosis:	Essential hypertension  Instructions for follow-up, activity and diet:	Cont home meds  Secondary Diagnosis:	Hyperkalemia  Secondary Diagnosis:	Polycythemia vera Principal Discharge DX:	Cellulitis of right lower extremity  Goal:	Take all your prescribed medications  Instructions for follow-up, activity and diet:	Take Keflex for 5 more days.   Follow up with your PMD within 1 week.  Secondary Diagnosis:	MIRIAN (acute kidney injury)  Secondary Diagnosis:	Stage 4 chronic kidney disease  Instructions for follow-up, activity and diet:	You need to be seen by kidney specialiast. Please make an appt.  Start taking sodium bicarb.  Avoid NSAIDs  Secondary Diagnosis:	Essential hypertension  Instructions for follow-up, activity and diet:	Cont home meds  Secondary Diagnosis:	Hyperkalemia  Instructions for follow-up, activity and diet:	improved , please follow up with your PMD for follow up labs  Secondary Diagnosis:	Polycythemia vera  Instructions for follow-up, activity and diet:	resolved, please follow up with your hematologist or PMD for follow up labs Principal Discharge DX:	Cellulitis of right lower extremity  Goal:	Take all your prescribed medications  Instructions for follow-up, activity and diet:	Take Keflex for 5 more days.   Follow up with your PMD within 1 week.  Secondary Diagnosis:	MIRIAN (acute kidney injury)  Instructions for follow-up, activity and diet:	resolved with hydration  Secondary Diagnosis:	Stage 4 chronic kidney disease  Instructions for follow-up, activity and diet:	You need to be seen by kidney specialiast. Please make an appt.  Start taking sodium bicarb.  Avoid NSAIDs  Secondary Diagnosis:	Essential hypertension  Instructions for follow-up, activity and diet:	Cont home meds  Secondary Diagnosis:	Hyperkalemia  Instructions for follow-up, activity and diet:	improved , please follow up with your PMD for follow up labs  Secondary Diagnosis:	Polycythemia vera  Instructions for follow-up, activity and diet:	resolved, please follow up with your hematologist or PMD for follow up labs Principal Discharge DX:	Cellulitis of right lower extremity  Goal:	s/p ABX  Instructions for follow-up, activity and diet:	You completed ABX    Follow up with your PMD within 1 week.  Secondary Diagnosis:	MIRIAN (acute kidney injury)  Instructions for follow-up, activity and diet:	on CKD  Avoid taking (NSAIDs) - (ex: Ibuprofen, Advil, Celebrex, Naprosyn)  Avoid taking any nephrotoxic agents (can harm kidneys) - Intravenous contrast for diagnostic testing, combination cold medications.  Have all medications adjusted for your renal function by your Health Care Provider.  Blood pressure control is important.  Take all medication as prescribed.  f/u as scheduled in renal clinic  Secondary Diagnosis:	Stage 4 chronic kidney disease  Instructions for follow-up, activity and diet:	Start taking sodium bicarb.  Avoid NSAIDs  Secondary Diagnosis:	Essential hypertension  Instructions for follow-up, activity and diet:	Cont home meds  Secondary Diagnosis:	Hyperkalemia  Instructions for follow-up, activity and diet:	resolved. f/u with BMP within 1 week  Secondary Diagnosis:	Polycythemia vera  Instructions for follow-up, activity and diet:	please follow up with your hematologist or PMD for follow up labs Principal Discharge DX:	Cellulitis of right lower extremity  Goal:	s/p ABX  Instructions for follow-up, activity and diet:	You completed ABX    Follow up with your PMD within 1 week.  Secondary Diagnosis:	MIRIAN (acute kidney injury)  Instructions for follow-up, activity and diet:	on CKD  Avoid taking (NSAIDs) - (ex: Ibuprofen, Advil, Celebrex, Naprosyn)  Avoid taking any nephrotoxic agents (can harm kidneys) - Intravenous contrast for diagnostic testing, combination cold medications.  Have all medications adjusted for your renal function by your Health Care Provider.  Blood pressure control is important.  Take all medication as prescribed.  f/u as scheduled in renal clinic  Secondary Diagnosis:	Stage 4 chronic kidney disease  Instructions for follow-up, activity and diet:	Start taking sodium bicarb.  Avoid NSAIDs  Secondary Diagnosis:	Essential hypertension  Instructions for follow-up, activity and diet:	Cont taking amlodipine. Stop hydrochlorothiazide and bystolic.  Secondary Diagnosis:	Hyperkalemia  Instructions for follow-up, activity and diet:	resolved. f/u with BMP within 1 week  Secondary Diagnosis:	Polycythemia vera  Instructions for follow-up, activity and diet:	please follow up with your hematologist or PMD for follow up labs

## 2017-01-19 NOTE — DISCHARGE NOTE ADULT - PLAN OF CARE
Take all your prescribed medications Take Keflex for 5 more days.   Follow up with your PMD within 1 week. You need to be seen by kidney specialiast. Please make an appt.  Start taking sodium bicarb.  Avoid NSAIDs Cont home meds improved , please follow up with your PMD for follow up labs resolved, please follow up with your hematologist or PMD for follow up labs resolved with hydration s/p ABX You completed ABX    Follow up with your PMD within 1 week. Start taking sodium bicarb.  Avoid NSAIDs resolved. f/u with BMP within 1 week please follow up with your hematologist or PMD for follow up labs on CKD  Avoid taking (NSAIDs) - (ex: Ibuprofen, Advil, Celebrex, Naprosyn)  Avoid taking any nephrotoxic agents (can harm kidneys) - Intravenous contrast for diagnostic testing, combination cold medications.  Have all medications adjusted for your renal function by your Health Care Provider.  Blood pressure control is important.  Take all medication as prescribed.  f/u as scheduled in renal clinic Cont taking amlodipine. Stop hydrochlorothiazide and bystolic.

## 2017-01-20 LAB
ANION GAP SERPL CALC-SCNC: 16 MMOL/L — SIGNIFICANT CHANGE UP (ref 5–17)
ASO AB SER QL: 59 IU/ML — SIGNIFICANT CHANGE UP (ref 0–408)
AUTO DIFF PNL BLD: NEGATIVE — SIGNIFICANT CHANGE UP
BUN SERPL-MCNC: 40 MG/DL — HIGH (ref 7–23)
C-ANCA SER-ACNC: NEGATIVE — SIGNIFICANT CHANGE UP
C3 SERPL-MCNC: 100 MG/DL — SIGNIFICANT CHANGE UP (ref 80–180)
C4 SERPL-MCNC: 32 MG/DL — SIGNIFICANT CHANGE UP (ref 10–45)
CALCIUM SERPL-MCNC: 8.3 MG/DL — LOW (ref 8.4–10.5)
CHLORIDE SERPL-SCNC: 107 MMOL/L — SIGNIFICANT CHANGE UP (ref 96–108)
CO2 SERPL-SCNC: 16 MMOL/L — LOW (ref 22–31)
CREAT ?TM UR-MCNC: 42 MG/DL — SIGNIFICANT CHANGE UP
CREAT SERPL-MCNC: 3.01 MG/DL — HIGH (ref 0.5–1.3)
DSDNA AB FLD-ACNC: <0.2 AI — SIGNIFICANT CHANGE UP
ENA SS-A AB FLD IA-ACNC: <0.2 AI — SIGNIFICANT CHANGE UP
GBM IGG SER-ACNC: <0.2 U — SIGNIFICANT CHANGE UP
GLUCOSE SERPL-MCNC: 100 MG/DL — HIGH (ref 70–99)
HBV SURFACE AB SER-ACNC: SIGNIFICANT CHANGE UP
HBV SURFACE AG SER-ACNC: SIGNIFICANT CHANGE UP
HCT VFR BLD CALC: 45.4 % — HIGH (ref 34.5–45)
HCV AB S/CO SERPL IA: 0.28 S/CO — SIGNIFICANT CHANGE UP
HCV AB SERPL-IMP: SIGNIFICANT CHANGE UP
HGB BLD-MCNC: 14.1 G/DL — SIGNIFICANT CHANGE UP (ref 11.5–15.5)
HIV 1+2 AB+HIV1 P24 AG SERPL QL IA: SIGNIFICANT CHANGE UP
KAPPA LC SER QL IFE: 14.4 MG/DL — HIGH (ref 0.33–1.94)
KAPPA/LAMBDA FREE LIGHT CHAIN RATIO, SERUM: 1 RATIO — SIGNIFICANT CHANGE UP (ref 0.26–1.65)
LAMBDA LC SER QL IFE: 14.4 MG/DL — HIGH (ref 0.57–2.63)
MAGNESIUM SERPL-MCNC: 1.6 MG/DL — SIGNIFICANT CHANGE UP (ref 1.6–2.6)
MCHC RBC-ENTMCNC: 21.9 PG — LOW (ref 27–34)
MCHC RBC-ENTMCNC: 31.1 GM/DL — LOW (ref 32–36)
MCV RBC AUTO: 70.5 FL — LOW (ref 80–100)
P-ANCA SER-ACNC: NEGATIVE — SIGNIFICANT CHANGE UP
PLATELET # BLD AUTO: 345 K/UL — SIGNIFICANT CHANGE UP (ref 150–400)
POTASSIUM SERPL-MCNC: 4.6 MMOL/L — SIGNIFICANT CHANGE UP (ref 3.5–5.3)
POTASSIUM SERPL-SCNC: 4.6 MMOL/L — SIGNIFICANT CHANGE UP (ref 3.5–5.3)
PROT ?TM UR-MCNC: 282 MG/DL — HIGH (ref 0–12)
PROT SERPL-MCNC: 6.8 G/DL — SIGNIFICANT CHANGE UP (ref 6–8.3)
PROT SERPL-MCNC: 6.8 G/DL — SIGNIFICANT CHANGE UP (ref 6–8.3)
PROT/CREAT UR-RTO: 6.7 RATIO — HIGH (ref 0–0.2)
RBC # BLD: 6.44 M/UL — HIGH (ref 3.8–5.2)
RBC # FLD: 21.1 % — HIGH (ref 10.3–14.5)
RHEUMATOID FACT SERPL-ACNC: <7 IU/ML — SIGNIFICANT CHANGE UP (ref 0–13.9)
SODIUM SERPL-SCNC: 139 MMOL/L — SIGNIFICANT CHANGE UP (ref 135–145)
T PALLIDUM AB TITR SER: NEGATIVE — SIGNIFICANT CHANGE UP
WBC # BLD: 12.95 K/UL — HIGH (ref 3.8–10.5)
WBC # FLD AUTO: 12.95 K/UL — HIGH (ref 3.8–10.5)

## 2017-01-20 PROCEDURE — 99232 SBSQ HOSP IP/OBS MODERATE 35: CPT

## 2017-01-20 PROCEDURE — 93975 VASCULAR STUDY: CPT | Mod: 26

## 2017-01-20 PROCEDURE — 99233 SBSQ HOSP IP/OBS HIGH 50: CPT | Mod: GC

## 2017-01-20 RX ORDER — CEPHALEXIN 500 MG
500 CAPSULE ORAL EVERY 12 HOURS
Qty: 0 | Refills: 0 | Status: DISCONTINUED | OUTPATIENT
Start: 2017-01-20 | End: 2017-01-23

## 2017-01-20 RX ORDER — ACETAMINOPHEN 500 MG
1000 TABLET ORAL EVERY 6 HOURS
Qty: 0 | Refills: 0 | Status: DISCONTINUED | OUTPATIENT
Start: 2017-01-20 | End: 2017-01-27

## 2017-01-20 RX ORDER — ACETAMINOPHEN 500 MG
650 TABLET ORAL ONCE
Qty: 0 | Refills: 0 | Status: COMPLETED | OUTPATIENT
Start: 2017-01-20 | End: 2017-01-20

## 2017-01-20 RX ADMIN — Medication 650 MILLIGRAM(S): at 11:48

## 2017-01-20 RX ADMIN — Medication 100 MILLIGRAM(S): at 21:39

## 2017-01-20 RX ADMIN — HEPARIN SODIUM 5000 UNIT(S): 5000 INJECTION INTRAVENOUS; SUBCUTANEOUS at 16:02

## 2017-01-20 RX ADMIN — Medication 1300 MILLIGRAM(S): at 16:03

## 2017-01-20 RX ADMIN — Medication 500 MILLIGRAM(S): at 21:39

## 2017-01-20 RX ADMIN — Medication 100 MILLIGRAM(S): at 05:41

## 2017-01-20 RX ADMIN — HEPARIN SODIUM 5000 UNIT(S): 5000 INJECTION INTRAVENOUS; SUBCUTANEOUS at 05:41

## 2017-01-20 RX ADMIN — Medication 1300 MILLIGRAM(S): at 05:41

## 2017-01-20 RX ADMIN — HEPARIN SODIUM 5000 UNIT(S): 5000 INJECTION INTRAVENOUS; SUBCUTANEOUS at 21:39

## 2017-01-20 RX ADMIN — AMLODIPINE BESYLATE 10 MILLIGRAM(S): 2.5 TABLET ORAL at 05:41

## 2017-01-20 RX ADMIN — Medication 1300 MILLIGRAM(S): at 21:39

## 2017-01-20 RX ADMIN — Medication 1000 MILLIGRAM(S): at 16:37

## 2017-01-20 RX ADMIN — Medication 100 MILLIGRAM(S): at 07:58

## 2017-01-21 LAB
% ALBUMIN: 46.9 % — SIGNIFICANT CHANGE UP
% ALPHA 1: 6.6 % — SIGNIFICANT CHANGE UP
% ALPHA 2: 9.5 % — SIGNIFICANT CHANGE UP
% BETA: 11.5 % — SIGNIFICANT CHANGE UP
% GAMMA: 25.5 % — SIGNIFICANT CHANGE UP
ALBUMIN SERPL ELPH-MCNC: 3.2 G/DL — LOW (ref 3.6–5.5)
ALBUMIN/GLOB SERPL ELPH: 0.9 RATIO — SIGNIFICANT CHANGE UP
ALPHA1 GLOB SERPL ELPH-MCNC: 0.4 G/DL — SIGNIFICANT CHANGE UP (ref 0.1–0.4)
ALPHA2 GLOB SERPL ELPH-MCNC: 0.6 G/DL — SIGNIFICANT CHANGE UP (ref 0.5–1)
ANA PAT FLD IF-IMP: ABNORMAL
ANA PAT FLD IF-IMP: ABNORMAL
ANA TITR SER: ABNORMAL
ANA TITR SER: ABNORMAL
ANION GAP SERPL CALC-SCNC: 20 MMOL/L — HIGH (ref 5–17)
B-GLOBULIN SERPL ELPH-MCNC: 0.8 G/DL — SIGNIFICANT CHANGE UP (ref 0.5–1)
BUN SERPL-MCNC: 39 MG/DL — HIGH (ref 7–23)
CALCIUM SERPL-MCNC: 8.7 MG/DL — SIGNIFICANT CHANGE UP (ref 8.4–10.5)
CHLORIDE SERPL-SCNC: 105 MMOL/L — SIGNIFICANT CHANGE UP (ref 96–108)
CO2 SERPL-SCNC: 14 MMOL/L — LOW (ref 22–31)
CREAT SERPL-MCNC: 2.9 MG/DL — HIGH (ref 0.5–1.3)
GAMMA GLOBULIN: 1.7 G/DL — HIGH (ref 0.6–1.6)
GLUCOSE SERPL-MCNC: 81 MG/DL — SIGNIFICANT CHANGE UP (ref 70–99)
HCT VFR BLD CALC: 43.2 % — SIGNIFICANT CHANGE UP (ref 34.5–45)
HGB BLD-MCNC: 13.4 G/DL — SIGNIFICANT CHANGE UP (ref 11.5–15.5)
MCHC RBC-ENTMCNC: 21.7 PG — LOW (ref 27–34)
MCHC RBC-ENTMCNC: 31 GM/DL — LOW (ref 32–36)
MCV RBC AUTO: 70 FL — LOW (ref 80–100)
PLATELET # BLD AUTO: 374 K/UL — SIGNIFICANT CHANGE UP (ref 150–400)
POTASSIUM SERPL-MCNC: 4.7 MMOL/L — SIGNIFICANT CHANGE UP (ref 3.5–5.3)
POTASSIUM SERPL-SCNC: 4.7 MMOL/L — SIGNIFICANT CHANGE UP (ref 3.5–5.3)
PROT PATTERN SERPL ELPH-IMP: SIGNIFICANT CHANGE UP
RBC # BLD: 6.17 M/UL — HIGH (ref 3.8–5.2)
RBC # FLD: 20.9 % — HIGH (ref 10.3–14.5)
SODIUM SERPL-SCNC: 139 MMOL/L — SIGNIFICANT CHANGE UP (ref 135–145)
WBC # BLD: 12.33 K/UL — HIGH (ref 3.8–10.5)
WBC # FLD AUTO: 12.33 K/UL — HIGH (ref 3.8–10.5)

## 2017-01-21 PROCEDURE — 99232 SBSQ HOSP IP/OBS MODERATE 35: CPT

## 2017-01-21 RX ADMIN — HEPARIN SODIUM 5000 UNIT(S): 5000 INJECTION INTRAVENOUS; SUBCUTANEOUS at 21:36

## 2017-01-21 RX ADMIN — Medication 1300 MILLIGRAM(S): at 21:36

## 2017-01-21 RX ADMIN — AMLODIPINE BESYLATE 10 MILLIGRAM(S): 2.5 TABLET ORAL at 05:48

## 2017-01-21 RX ADMIN — HEPARIN SODIUM 5000 UNIT(S): 5000 INJECTION INTRAVENOUS; SUBCUTANEOUS at 05:48

## 2017-01-21 RX ADMIN — Medication 500 MILLIGRAM(S): at 21:36

## 2017-01-21 RX ADMIN — Medication 100 MILLIGRAM(S): at 13:49

## 2017-01-21 RX ADMIN — Medication 100 MILLIGRAM(S): at 05:47

## 2017-01-21 RX ADMIN — Medication 500 MILLIGRAM(S): at 08:35

## 2017-01-21 RX ADMIN — Medication 100 MILLIGRAM(S): at 21:36

## 2017-01-21 RX ADMIN — Medication 1300 MILLIGRAM(S): at 13:50

## 2017-01-21 RX ADMIN — HEPARIN SODIUM 5000 UNIT(S): 5000 INJECTION INTRAVENOUS; SUBCUTANEOUS at 13:50

## 2017-01-21 RX ADMIN — Medication 1300 MILLIGRAM(S): at 05:47

## 2017-01-22 PROCEDURE — 99232 SBSQ HOSP IP/OBS MODERATE 35: CPT

## 2017-01-22 RX ADMIN — Medication 1300 MILLIGRAM(S): at 13:39

## 2017-01-22 RX ADMIN — Medication 1300 MILLIGRAM(S): at 22:37

## 2017-01-22 RX ADMIN — Medication 100 MILLIGRAM(S): at 13:38

## 2017-01-22 RX ADMIN — Medication 1300 MILLIGRAM(S): at 05:42

## 2017-01-22 RX ADMIN — Medication 100 MILLIGRAM(S): at 22:37

## 2017-01-22 RX ADMIN — Medication 100 MILLIGRAM(S): at 05:42

## 2017-01-22 RX ADMIN — Medication 500 MILLIGRAM(S): at 22:38

## 2017-01-22 RX ADMIN — HEPARIN SODIUM 5000 UNIT(S): 5000 INJECTION INTRAVENOUS; SUBCUTANEOUS at 05:42

## 2017-01-22 RX ADMIN — AMLODIPINE BESYLATE 10 MILLIGRAM(S): 2.5 TABLET ORAL at 05:42

## 2017-01-22 RX ADMIN — Medication 500 MILLIGRAM(S): at 09:58

## 2017-01-23 ENCOUNTER — APPOINTMENT (OUTPATIENT)
Dept: ULTRASOUND IMAGING | Facility: HOSPITAL | Age: 57
End: 2017-01-23

## 2017-01-23 LAB
ANION GAP SERPL CALC-SCNC: 15 MMOL/L — SIGNIFICANT CHANGE UP (ref 5–17)
APTT BLD: 40.2 SEC — HIGH (ref 27.5–37.4)
BUN SERPL-MCNC: 43 MG/DL — HIGH (ref 7–23)
CALCIUM SERPL-MCNC: 8.6 MG/DL — SIGNIFICANT CHANGE UP (ref 8.4–10.5)
CHLORIDE SERPL-SCNC: 103 MMOL/L — SIGNIFICANT CHANGE UP (ref 96–108)
CO2 SERPL-SCNC: 21 MMOL/L — LOW (ref 22–31)
CREAT SERPL-MCNC: 2.68 MG/DL — HIGH (ref 0.5–1.3)
CULTURE RESULTS: SIGNIFICANT CHANGE UP
CULTURE RESULTS: SIGNIFICANT CHANGE UP
GLUCOSE SERPL-MCNC: 106 MG/DL — HIGH (ref 70–99)
HCT VFR BLD CALC: 46.6 % — HIGH (ref 34.5–45)
HCV GENTYP BLD NAA+PROBE: ABNORMAL
HGB BLD-MCNC: 14.7 G/DL — SIGNIFICANT CHANGE UP (ref 11.5–15.5)
INR BLD: 1.15 RATIO — SIGNIFICANT CHANGE UP (ref 0.88–1.16)
MCHC RBC-ENTMCNC: 22.2 PG — LOW (ref 27–34)
MCHC RBC-ENTMCNC: 31.4 GM/DL — LOW (ref 32–36)
MCV RBC AUTO: 70.8 FL — LOW (ref 80–100)
PLATELET # BLD AUTO: 397 K/UL — SIGNIFICANT CHANGE UP (ref 150–400)
POTASSIUM SERPL-MCNC: 5.1 MMOL/L — SIGNIFICANT CHANGE UP (ref 3.5–5.3)
POTASSIUM SERPL-SCNC: 5.1 MMOL/L — SIGNIFICANT CHANGE UP (ref 3.5–5.3)
PROT SERPL-MCNC: 7.5 G/DL — SIGNIFICANT CHANGE UP (ref 6–8.3)
PROT SERPL-MCNC: 7.5 G/DL — SIGNIFICANT CHANGE UP (ref 6–8.3)
PROTHROM AB SERPL-ACNC: 12.4 SEC — SIGNIFICANT CHANGE UP (ref 10–13.1)
RBC # BLD: 6.59 M/UL — HIGH (ref 3.8–5.2)
RBC # FLD: 21 % — HIGH (ref 10.3–14.5)
SODIUM SERPL-SCNC: 139 MMOL/L — SIGNIFICANT CHANGE UP (ref 135–145)
SPECIMEN SOURCE: SIGNIFICANT CHANGE UP
SPECIMEN SOURCE: SIGNIFICANT CHANGE UP
WBC # BLD: 15 K/UL — HIGH (ref 3.8–10.5)
WBC # FLD AUTO: 15 K/UL — HIGH (ref 3.8–10.5)

## 2017-01-23 PROCEDURE — 99233 SBSQ HOSP IP/OBS HIGH 50: CPT | Mod: GC

## 2017-01-23 PROCEDURE — 99233 SBSQ HOSP IP/OBS HIGH 50: CPT

## 2017-01-23 RX ORDER — DESMOPRESSIN ACETATE 0.1 MG/1
20 TABLET ORAL ONCE
Qty: 0 | Refills: 0 | Status: COMPLETED | OUTPATIENT
Start: 2017-01-23 | End: 2017-01-23

## 2017-01-23 RX ADMIN — Medication 1300 MILLIGRAM(S): at 22:19

## 2017-01-23 RX ADMIN — Medication 100 MILLIGRAM(S): at 05:37

## 2017-01-23 RX ADMIN — AMLODIPINE BESYLATE 10 MILLIGRAM(S): 2.5 TABLET ORAL at 05:37

## 2017-01-23 RX ADMIN — Medication 1300 MILLIGRAM(S): at 05:37

## 2017-01-23 RX ADMIN — DESMOPRESSIN ACETATE 220 MICROGRAM(S): 0.1 TABLET ORAL at 14:49

## 2017-01-23 RX ADMIN — Medication 500 MILLIGRAM(S): at 08:05

## 2017-01-23 RX ADMIN — Medication 100 MILLIGRAM(S): at 22:19

## 2017-01-24 LAB
ANION GAP SERPL CALC-SCNC: 14 MMOL/L — SIGNIFICANT CHANGE UP (ref 5–17)
ANION GAP SERPL CALC-SCNC: 15 MMOL/L — SIGNIFICANT CHANGE UP (ref 5–17)
APTT BLD: 30.7 SEC — SIGNIFICANT CHANGE UP (ref 27.5–37.4)
BUN SERPL-MCNC: 48 MG/DL — HIGH (ref 7–23)
BUN SERPL-MCNC: 50 MG/DL — HIGH (ref 7–23)
CALCIUM SERPL-MCNC: 8.5 MG/DL — SIGNIFICANT CHANGE UP (ref 8.4–10.5)
CALCIUM SERPL-MCNC: 8.7 MG/DL — SIGNIFICANT CHANGE UP (ref 8.4–10.5)
CHLORIDE SERPL-SCNC: 103 MMOL/L — SIGNIFICANT CHANGE UP (ref 96–108)
CHLORIDE SERPL-SCNC: 103 MMOL/L — SIGNIFICANT CHANGE UP (ref 96–108)
CO2 SERPL-SCNC: 18 MMOL/L — LOW (ref 22–31)
CO2 SERPL-SCNC: 21 MMOL/L — LOW (ref 22–31)
CREAT SERPL-MCNC: 2.88 MG/DL — HIGH (ref 0.5–1.3)
CREAT SERPL-MCNC: 2.9 MG/DL — HIGH (ref 0.5–1.3)
GLUCOSE SERPL-MCNC: 173 MG/DL — HIGH (ref 70–99)
GLUCOSE SERPL-MCNC: 94 MG/DL — SIGNIFICANT CHANGE UP (ref 70–99)
HCT VFR BLD CALC: 45.1 % — HIGH (ref 34.5–45)
HGB BLD-MCNC: 13.7 G/DL — SIGNIFICANT CHANGE UP (ref 11.5–15.5)
INR BLD: 1.16 RATIO — SIGNIFICANT CHANGE UP (ref 0.88–1.16)
MCHC RBC-ENTMCNC: 21.4 PG — LOW (ref 27–34)
MCHC RBC-ENTMCNC: 30.4 GM/DL — LOW (ref 32–36)
MCV RBC AUTO: 70.6 FL — LOW (ref 80–100)
PLATELET # BLD AUTO: 307 K/UL — SIGNIFICANT CHANGE UP (ref 150–400)
POTASSIUM SERPL-MCNC: 5.1 MMOL/L — SIGNIFICANT CHANGE UP (ref 3.5–5.3)
POTASSIUM SERPL-MCNC: 6.3 MMOL/L — CRITICAL HIGH (ref 3.5–5.3)
POTASSIUM SERPL-SCNC: 5.1 MMOL/L — SIGNIFICANT CHANGE UP (ref 3.5–5.3)
POTASSIUM SERPL-SCNC: 6.3 MMOL/L — CRITICAL HIGH (ref 3.5–5.3)
PROTHROM AB SERPL-ACNC: 12.6 SEC — SIGNIFICANT CHANGE UP (ref 10–13.1)
RBC # BLD: 6.39 M/UL — HIGH (ref 3.8–5.2)
RBC # FLD: 20.7 % — HIGH (ref 10.3–14.5)
SODIUM SERPL-SCNC: 135 MMOL/L — SIGNIFICANT CHANGE UP (ref 135–145)
SODIUM SERPL-SCNC: 139 MMOL/L — SIGNIFICANT CHANGE UP (ref 135–145)
WBC # BLD: 14.02 K/UL — HIGH (ref 3.8–10.5)
WBC # FLD AUTO: 14.02 K/UL — HIGH (ref 3.8–10.5)

## 2017-01-24 PROCEDURE — 99232 SBSQ HOSP IP/OBS MODERATE 35: CPT

## 2017-01-24 PROCEDURE — 99233 SBSQ HOSP IP/OBS HIGH 50: CPT | Mod: GC

## 2017-01-24 RX ORDER — CALCIUM CARBONATE 500(1250)
1 TABLET ORAL ONCE
Qty: 0 | Refills: 0 | Status: COMPLETED | OUTPATIENT
Start: 2017-01-24 | End: 2017-01-24

## 2017-01-24 RX ADMIN — Medication 100 MILLIGRAM(S): at 05:22

## 2017-01-24 RX ADMIN — Medication 1300 MILLIGRAM(S): at 05:22

## 2017-01-24 RX ADMIN — Medication 1300 MILLIGRAM(S): at 13:26

## 2017-01-24 RX ADMIN — Medication 1300 MILLIGRAM(S): at 21:19

## 2017-01-24 RX ADMIN — Medication 100 MILLIGRAM(S): at 13:26

## 2017-01-24 RX ADMIN — AMLODIPINE BESYLATE 10 MILLIGRAM(S): 2.5 TABLET ORAL at 05:22

## 2017-01-24 NOTE — PROVIDER CONTACT NOTE (OTHER) - SITUATION
received report to "stack" urine samples for MD to assess color of urine prior to renal biopsy-- pt will go for biopsy on thursday, not necessary to save urine today

## 2017-01-25 ENCOUNTER — RESULT REVIEW (OUTPATIENT)
Age: 57
End: 2017-01-25

## 2017-01-25 LAB
ANION GAP SERPL CALC-SCNC: 17 MMOL/L — SIGNIFICANT CHANGE UP (ref 5–17)
BASOPHILS # BLD AUTO: 0 K/UL — SIGNIFICANT CHANGE UP (ref 0–0.2)
BASOPHILS NFR BLD AUTO: 0 % — SIGNIFICANT CHANGE UP (ref 0–2)
BUN SERPL-MCNC: 49 MG/DL — HIGH (ref 7–23)
CALCIUM SERPL-MCNC: 9.2 MG/DL — SIGNIFICANT CHANGE UP (ref 8.4–10.5)
CHLORIDE SERPL-SCNC: 100 MMOL/L — SIGNIFICANT CHANGE UP (ref 96–108)
CO2 SERPL-SCNC: 20 MMOL/L — LOW (ref 22–31)
CREAT SERPL-MCNC: 2.92 MG/DL — HIGH (ref 0.5–1.3)
EOSINOPHIL # BLD AUTO: 0.31 K/UL — SIGNIFICANT CHANGE UP (ref 0–0.5)
EOSINOPHIL NFR BLD AUTO: 1.8 % — SIGNIFICANT CHANGE UP (ref 0–6)
GLUCOSE SERPL-MCNC: 76 MG/DL — SIGNIFICANT CHANGE UP (ref 70–99)
HCT VFR BLD CALC: 44.5 % — SIGNIFICANT CHANGE UP (ref 34.5–45)
HGB BLD-MCNC: 13.8 G/DL — SIGNIFICANT CHANGE UP (ref 11.5–15.5)
KAPPA LC SER QL IFE: 12 MG/DL — HIGH (ref 0.33–1.94)
KAPPA/LAMBDA FREE LIGHT CHAIN RATIO, SERUM: 0.85 RATIO — SIGNIFICANT CHANGE UP (ref 0.26–1.65)
LAMBDA LC SER QL IFE: 14.1 MG/DL — HIGH (ref 0.57–2.63)
LYMPHOCYTES # BLD AUTO: 1.07 K/UL — SIGNIFICANT CHANGE UP (ref 1–3.3)
LYMPHOCYTES # BLD AUTO: 6.2 % — LOW (ref 13–44)
MCHC RBC-ENTMCNC: 21.9 PG — LOW (ref 27–34)
MCHC RBC-ENTMCNC: 31 GM/DL — LOW (ref 32–36)
MCV RBC AUTO: 70.6 FL — LOW (ref 80–100)
MONOCYTES # BLD AUTO: 0.6 K/UL — SIGNIFICANT CHANGE UP (ref 0–0.9)
MONOCYTES NFR BLD AUTO: 3.5 % — SIGNIFICANT CHANGE UP (ref 2–14)
NEUTROPHILS # BLD AUTO: 15.28 K/UL — HIGH (ref 1.8–7.4)
NEUTROPHILS NFR BLD AUTO: 88.5 % — HIGH (ref 43–77)
PLATELET # BLD AUTO: 387 K/UL — SIGNIFICANT CHANGE UP (ref 150–400)
POTASSIUM SERPL-MCNC: 5.5 MMOL/L — HIGH (ref 3.5–5.3)
POTASSIUM SERPL-SCNC: 5.5 MMOL/L — HIGH (ref 3.5–5.3)
RBC # BLD: 6.3 M/UL — HIGH (ref 3.8–5.2)
RBC # FLD: 21.2 % — HIGH (ref 10.3–14.5)
SODIUM SERPL-SCNC: 137 MMOL/L — SIGNIFICANT CHANGE UP (ref 135–145)
WBC # BLD: 17.26 K/UL — HIGH (ref 3.8–10.5)
WBC # FLD AUTO: 17.26 K/UL — HIGH (ref 3.8–10.5)

## 2017-01-25 PROCEDURE — 99233 SBSQ HOSP IP/OBS HIGH 50: CPT | Mod: GC

## 2017-01-25 PROCEDURE — 99232 SBSQ HOSP IP/OBS MODERATE 35: CPT

## 2017-01-25 RX ORDER — DESMOPRESSIN ACETATE 0.1 MG/1
20 TABLET ORAL ONCE
Qty: 0 | Refills: 0 | Status: COMPLETED | OUTPATIENT
Start: 2017-01-26 | End: 2017-01-26

## 2017-01-25 RX ORDER — DIPHENHYDRAMINE HCL 50 MG
25 CAPSULE ORAL ONCE
Qty: 0 | Refills: 0 | Status: COMPLETED | OUTPATIENT
Start: 2017-01-25 | End: 2017-01-25

## 2017-01-25 RX ADMIN — AMLODIPINE BESYLATE 10 MILLIGRAM(S): 2.5 TABLET ORAL at 05:45

## 2017-01-25 RX ADMIN — Medication 100 MILLIGRAM(S): at 21:33

## 2017-01-25 RX ADMIN — Medication 1300 MILLIGRAM(S): at 14:00

## 2017-01-25 RX ADMIN — Medication 25 MILLIGRAM(S): at 21:33

## 2017-01-25 RX ADMIN — Medication 100 MILLIGRAM(S): at 14:00

## 2017-01-25 RX ADMIN — Medication 1300 MILLIGRAM(S): at 21:33

## 2017-01-25 RX ADMIN — Medication 1300 MILLIGRAM(S): at 05:45

## 2017-01-26 ENCOUNTER — APPOINTMENT (OUTPATIENT)
Dept: ULTRASOUND IMAGING | Facility: HOSPITAL | Age: 57
End: 2017-01-26

## 2017-01-26 LAB
% ALBUMIN: 47.9 % — SIGNIFICANT CHANGE UP
% ALPHA 1: 5.8 % — SIGNIFICANT CHANGE UP
% ALPHA 2: 8.7 % — SIGNIFICANT CHANGE UP
% BETA: 12.5 % — SIGNIFICANT CHANGE UP
% GAMMA: 25.1 % — SIGNIFICANT CHANGE UP
ALBUMIN SERPL ELPH-MCNC: 3.4 G/DL — SIGNIFICANT CHANGE UP (ref 3.3–5)
ALBUMIN SERPL ELPH-MCNC: 3.6 G/DL — SIGNIFICANT CHANGE UP (ref 3.6–5.5)
ALBUMIN/GLOB SERPL ELPH: 0.9 RATIO — SIGNIFICANT CHANGE UP
ALPHA1 GLOB SERPL ELPH-MCNC: 0.4 G/DL — SIGNIFICANT CHANGE UP (ref 0.1–0.4)
ALPHA2 GLOB SERPL ELPH-MCNC: 0.7 G/DL — SIGNIFICANT CHANGE UP (ref 0.5–1)
ANION GAP SERPL CALC-SCNC: 14 MMOL/L — SIGNIFICANT CHANGE UP (ref 5–17)
ANION GAP SERPL CALC-SCNC: 19 MMOL/L — HIGH (ref 5–17)
ANION GAP SERPL CALC-SCNC: 19 MMOL/L — HIGH (ref 5–17)
ANISOCYTOSIS BLD QL: SLIGHT — SIGNIFICANT CHANGE UP
B-GLOBULIN SERPL ELPH-MCNC: 0.9 G/DL — SIGNIFICANT CHANGE UP (ref 0.5–1)
BASOPHILS # BLD AUTO: 0.12 K/UL — SIGNIFICANT CHANGE UP (ref 0–0.2)
BASOPHILS NFR BLD AUTO: 0.8 % — SIGNIFICANT CHANGE UP (ref 0–2)
BUN SERPL-MCNC: 52 MG/DL — HIGH (ref 7–23)
BUN SERPL-MCNC: 54 MG/DL — HIGH (ref 7–23)
BUN SERPL-MCNC: 55 MG/DL — HIGH (ref 7–23)
CALCIUM SERPL-MCNC: 8.1 MG/DL — LOW (ref 8.4–10.5)
CALCIUM SERPL-MCNC: 8.3 MG/DL — LOW (ref 8.4–10.5)
CALCIUM SERPL-MCNC: 8.5 MG/DL — SIGNIFICANT CHANGE UP (ref 8.4–10.5)
CHLORIDE SERPL-SCNC: 102 MMOL/L — SIGNIFICANT CHANGE UP (ref 96–108)
CHLORIDE SERPL-SCNC: 103 MMOL/L — SIGNIFICANT CHANGE UP (ref 96–108)
CHLORIDE SERPL-SCNC: 97 MMOL/L — SIGNIFICANT CHANGE UP (ref 96–108)
CO2 SERPL-SCNC: 19 MMOL/L — LOW (ref 22–31)
CO2 SERPL-SCNC: 21 MMOL/L — LOW (ref 22–31)
CO2 SERPL-SCNC: 22 MMOL/L — SIGNIFICANT CHANGE UP (ref 22–31)
CREAT SERPL-MCNC: 3.08 MG/DL — HIGH (ref 0.5–1.3)
CREAT SERPL-MCNC: 3.19 MG/DL — HIGH (ref 0.5–1.3)
CREAT SERPL-MCNC: 3.66 MG/DL — HIGH (ref 0.5–1.3)
EOSINOPHIL # BLD AUTO: 0.38 K/UL — SIGNIFICANT CHANGE UP (ref 0–0.5)
EOSINOPHIL NFR BLD AUTO: 2.4 % — SIGNIFICANT CHANGE UP (ref 0–6)
GAMMA GLOBULIN: 1.9 G/DL — HIGH (ref 0.6–1.6)
GLUCOSE SERPL-MCNC: 135 MG/DL — HIGH (ref 70–99)
GLUCOSE SERPL-MCNC: 183 MG/DL — HIGH (ref 70–99)
GLUCOSE SERPL-MCNC: 91 MG/DL — SIGNIFICANT CHANGE UP (ref 70–99)
HCT VFR BLD CALC: 43.5 % — SIGNIFICANT CHANGE UP (ref 34.5–45)
HGB BLD-MCNC: 13.3 G/DL — SIGNIFICANT CHANGE UP (ref 11.5–15.5)
IMM GRANULOCYTES NFR BLD AUTO: 0.6 % — SIGNIFICANT CHANGE UP (ref 0–1.5)
INTERPRETATION SERPL IFE-IMP: SIGNIFICANT CHANGE UP
LYMPHOCYTES # BLD AUTO: 1.7 K/UL — SIGNIFICANT CHANGE UP (ref 1–3.3)
LYMPHOCYTES # BLD AUTO: 10.9 % — LOW (ref 13–44)
MANUAL SMEAR VERIFICATION: SIGNIFICANT CHANGE UP
MCHC RBC-ENTMCNC: 21.5 PG — LOW (ref 27–34)
MCHC RBC-ENTMCNC: 30.6 GM/DL — LOW (ref 32–36)
MCV RBC AUTO: 70.3 FL — LOW (ref 80–100)
MICROCYTES BLD QL: SLIGHT — SIGNIFICANT CHANGE UP
MONOCYTES # BLD AUTO: 0.63 K/UL — SIGNIFICANT CHANGE UP (ref 0–0.9)
MONOCYTES NFR BLD AUTO: 4.1 % — SIGNIFICANT CHANGE UP (ref 2–14)
NEUTROPHILS # BLD AUTO: 12.61 K/UL — HIGH (ref 1.8–7.4)
NEUTROPHILS NFR BLD AUTO: 81.2 % — HIGH (ref 43–77)
PHOSPHATE SERPL-MCNC: 5.7 MG/DL — HIGH (ref 2.5–4.5)
PLAT MORPH BLD: NORMAL — SIGNIFICANT CHANGE UP
PLATELET # BLD AUTO: 395 K/UL — SIGNIFICANT CHANGE UP (ref 150–400)
POIKILOCYTOSIS BLD QL AUTO: SIGNIFICANT CHANGE UP
POTASSIUM SERPL-MCNC: 4.8 MMOL/L — SIGNIFICANT CHANGE UP (ref 3.5–5.3)
POTASSIUM SERPL-MCNC: 5.1 MMOL/L — SIGNIFICANT CHANGE UP (ref 3.5–5.3)
POTASSIUM SERPL-MCNC: 5.7 MMOL/L — HIGH (ref 3.5–5.3)
POTASSIUM SERPL-SCNC: 4.8 MMOL/L — SIGNIFICANT CHANGE UP (ref 3.5–5.3)
POTASSIUM SERPL-SCNC: 5.1 MMOL/L — SIGNIFICANT CHANGE UP (ref 3.5–5.3)
POTASSIUM SERPL-SCNC: 5.7 MMOL/L — HIGH (ref 3.5–5.3)
PROT PATTERN SERPL ELPH-IMP: SIGNIFICANT CHANGE UP
RBC # BLD: 6.19 M/UL — HIGH (ref 3.8–5.2)
RBC # FLD: 20.2 % — HIGH (ref 10.3–14.5)
RBC BLD AUTO: ABNORMAL
SODIUM SERPL-SCNC: 137 MMOL/L — SIGNIFICANT CHANGE UP (ref 135–145)
SODIUM SERPL-SCNC: 138 MMOL/L — SIGNIFICANT CHANGE UP (ref 135–145)
SODIUM SERPL-SCNC: 141 MMOL/L — SIGNIFICANT CHANGE UP (ref 135–145)
WBC # BLD: 15.54 K/UL — HIGH (ref 3.8–10.5)
WBC # FLD AUTO: 15.54 K/UL — HIGH (ref 3.8–10.5)

## 2017-01-26 PROCEDURE — 88305 TISSUE EXAM BY PATHOLOGIST: CPT | Mod: 26

## 2017-01-26 PROCEDURE — 99233 SBSQ HOSP IP/OBS HIGH 50: CPT

## 2017-01-26 PROCEDURE — 88346 IMFLUOR 1ST 1ANTB STAIN PX: CPT | Mod: 26

## 2017-01-26 PROCEDURE — 50200 RENAL BIOPSY PERQ: CPT | Mod: LT

## 2017-01-26 PROCEDURE — 88348 ELECTRON MICROSCOPY DX: CPT | Mod: 26

## 2017-01-26 PROCEDURE — 76942 ECHO GUIDE FOR BIOPSY: CPT | Mod: 26

## 2017-01-26 PROCEDURE — 99233 SBSQ HOSP IP/OBS HIGH 50: CPT | Mod: GC

## 2017-01-26 PROCEDURE — 88313 SPECIAL STAINS GROUP 2: CPT | Mod: 26

## 2017-01-26 PROCEDURE — 88350 IMFLUOR EA ADDL 1ANTB STN PX: CPT | Mod: 26

## 2017-01-26 PROCEDURE — 88312 SPECIAL STAINS GROUP 1: CPT | Mod: 26

## 2017-01-26 RX ORDER — DIPHENHYDRAMINE HCL 50 MG
25 CAPSULE ORAL ONCE
Qty: 0 | Refills: 0 | Status: COMPLETED | OUTPATIENT
Start: 2017-01-26 | End: 2017-01-26

## 2017-01-26 RX ORDER — SODIUM POLYSTYRENE SULFONATE 4.1 MEQ/G
30 POWDER, FOR SUSPENSION ORAL ONCE
Qty: 0 | Refills: 0 | Status: COMPLETED | OUTPATIENT
Start: 2017-01-26 | End: 2017-01-26

## 2017-01-26 RX ORDER — SODIUM CHLORIDE 9 MG/ML
1000 INJECTION INTRAMUSCULAR; INTRAVENOUS; SUBCUTANEOUS
Qty: 0 | Refills: 0 | Status: DISCONTINUED | OUTPATIENT
Start: 2017-01-26 | End: 2017-01-27

## 2017-01-26 RX ADMIN — AMLODIPINE BESYLATE 10 MILLIGRAM(S): 2.5 TABLET ORAL at 12:24

## 2017-01-26 RX ADMIN — Medication 1300 MILLIGRAM(S): at 13:52

## 2017-01-26 RX ADMIN — Medication 100 MILLIGRAM(S): at 21:48

## 2017-01-26 RX ADMIN — SODIUM POLYSTYRENE SULFONATE 30 GRAM(S): 4.1 POWDER, FOR SUSPENSION ORAL at 12:24

## 2017-01-26 RX ADMIN — DESMOPRESSIN ACETATE 220 MICROGRAM(S): 0.1 TABLET ORAL at 08:39

## 2017-01-26 RX ADMIN — Medication 25 MILLIGRAM(S): at 06:08

## 2017-01-26 RX ADMIN — Medication 100 MILLIGRAM(S): at 13:51

## 2017-01-26 RX ADMIN — Medication 1300 MILLIGRAM(S): at 21:48

## 2017-01-26 NOTE — DIETITIAN INITIAL EVALUATION ADULT. - ENERGY NEEDS
Current weight (1/18/17): 115 pounds Height: 5'2"  BMI: 21.0 kg/m^2   Ideal body weight: 110 pounds (+/-10%),105% ideal body weight   No pressure ulcers, 1+edema right ankle

## 2017-01-26 NOTE — DIETITIAN INITIAL EVALUATION ADULT. - NS FNS REASON FOR WEIGHT CHANG
other (specify)/Patient states the weight loss started after she was in the hospital in October 2016 (admitted for headache and high blood pressure). Patient states reason for weight change is unknown, she reports eating the same as she typically did.

## 2017-01-26 NOTE — DIETITIAN INITIAL EVALUATION ADULT. - ADHERENCE
Patient denies taking any supplements. Patient reports trying to limit potassium intake as recommended by her doctor but having difficulties due to frequent consumption of vegetables./n/a

## 2017-01-26 NOTE — DIETITIAN INITIAL EVALUATION ADULT. - PERTINENT LABORATORY DATA
(1/18/17): HbA1c 5.7 (1/25/17): Glucose 135 (H), BUN 54 (H), Creatinine 3.08 (H), Phosphorus 5.7 (H)

## 2017-01-26 NOTE — DIETITIAN INITIAL EVALUATION ADULT. - OTHER INFO
Nutrition assessment warranted for length of stay; patient admitted with cellulitis, found to have MIRIAN on CKD, having renal biopsy today (currently NPO). Prior to current NPO status, patient was receiving consistent carbohydrate DASH renal diet and reports family bringing her food from home, states she had a good appetite and was eating well. Patient states she has some nausea but no other gastrointestinal distress (vomiting, diarrhea, constipation). Patient denies difficulties chewing or swallowing and states no known food allergies. Provided food lists for high and low potassium foods and will remain available as needed for further education.

## 2017-01-26 NOTE — DIETITIAN INITIAL EVALUATION ADULT. - SOURCE
family/significant other/Review of patient's medical chart, Patient's daughter/other (specify)/patient

## 2017-01-26 NOTE — DIETITIAN INITIAL EVALUATION ADULT. - ETIOLOGY
inadequate PO intake questionable etiology, suspect inadequate protein energy intake prior to admission

## 2017-01-26 NOTE — DIETITIAN INITIAL EVALUATION ADULT. - NUTRITION INTERVENTION
Meals and Snack/Feeding Assistance/Nutrition Education/Medical Food Supplements/Collaboration and Referral of Nutrition Care

## 2017-01-26 NOTE — DIETITIAN INITIAL EVALUATION ADULT. - PT NOT SOURCE
Patient speaks Azeri,  services offered, patient declined and called her daughter to translate, consent given by patient./other (specify)

## 2017-01-26 NOTE — DIETITIAN INITIAL EVALUATION ADULT. - ORAL INTAKE PTA
good/Patient reports good appetite prior to admission, states she was eating 2-3 meals per day with snacks in between. Patient reports eating Maced[place] food, which consisted of rice and vegetables, and snacks were fruit or cookies.

## 2017-01-27 VITALS
SYSTOLIC BLOOD PRESSURE: 146 MMHG | RESPIRATION RATE: 18 BRPM | HEART RATE: 69 BPM | OXYGEN SATURATION: 98 % | TEMPERATURE: 98 F | DIASTOLIC BLOOD PRESSURE: 92 MMHG

## 2017-01-27 LAB
ANION GAP SERPL CALC-SCNC: 16 MMOL/L — SIGNIFICANT CHANGE UP (ref 5–17)
BUN SERPL-MCNC: 43 MG/DL — HIGH (ref 7–23)
CALCIUM SERPL-MCNC: 8.4 MG/DL — SIGNIFICANT CHANGE UP (ref 8.4–10.5)
CHLORIDE SERPL-SCNC: 103 MMOL/L — SIGNIFICANT CHANGE UP (ref 96–108)
CO2 SERPL-SCNC: 20 MMOL/L — LOW (ref 22–31)
CREAT SERPL-MCNC: 2.98 MG/DL — HIGH (ref 0.5–1.3)
GLUCOSE SERPL-MCNC: 95 MG/DL — SIGNIFICANT CHANGE UP (ref 70–99)
HCV RNA SERPL NAA DL=5-ACNC: SIGNIFICANT CHANGE UP IU/ML
HCV RNA SPEC NAA+PROBE-LOG IU: SIGNIFICANT CHANGE UP LOGIU/ML
MAGNESIUM SERPL-MCNC: 1.8 MG/DL — SIGNIFICANT CHANGE UP (ref 1.6–2.6)
PHOSPHATE SERPL-MCNC: 4.6 MG/DL — HIGH (ref 2.5–4.5)
POTASSIUM SERPL-MCNC: 4.4 MMOL/L — SIGNIFICANT CHANGE UP (ref 3.5–5.3)
POTASSIUM SERPL-SCNC: 4.4 MMOL/L — SIGNIFICANT CHANGE UP (ref 3.5–5.3)
SODIUM SERPL-SCNC: 139 MMOL/L — SIGNIFICANT CHANGE UP (ref 135–145)

## 2017-01-27 PROCEDURE — 99233 SBSQ HOSP IP/OBS HIGH 50: CPT | Mod: GC

## 2017-01-27 PROCEDURE — 99239 HOSP IP/OBS DSCHRG MGMT >30: CPT

## 2017-01-27 RX ORDER — NEBIVOLOL HYDROCHLORIDE 5 MG/1
1 TABLET ORAL
Qty: 0 | Refills: 0 | COMMUNITY

## 2017-01-27 RX ADMIN — Medication 1300 MILLIGRAM(S): at 12:52

## 2017-01-27 RX ADMIN — Medication 1300 MILLIGRAM(S): at 05:35

## 2017-01-27 RX ADMIN — Medication 100 MILLIGRAM(S): at 05:36

## 2017-01-27 RX ADMIN — Medication 100 MILLIGRAM(S): at 12:52

## 2017-01-27 RX ADMIN — AMLODIPINE BESYLATE 10 MILLIGRAM(S): 2.5 TABLET ORAL at 05:35

## 2017-02-02 ENCOUNTER — APPOINTMENT (OUTPATIENT)
Dept: INTERNAL MEDICINE | Facility: CLINIC | Age: 57
End: 2017-02-02

## 2017-02-02 ENCOUNTER — LABORATORY RESULT (OUTPATIENT)
Age: 57
End: 2017-02-02

## 2017-02-02 ENCOUNTER — OUTPATIENT (OUTPATIENT)
Dept: OUTPATIENT SERVICES | Facility: HOSPITAL | Age: 57
LOS: 1 days | End: 2017-02-02
Payer: SELF-PAY

## 2017-02-02 VITALS
DIASTOLIC BLOOD PRESSURE: 86 MMHG | HEIGHT: 60.63 IN | WEIGHT: 120 LBS | BODY MASS INDEX: 22.95 KG/M2 | SYSTOLIC BLOOD PRESSURE: 144 MMHG

## 2017-02-02 DIAGNOSIS — N18.5 CHRONIC KIDNEY DISEASE, STAGE 5: ICD-10-CM

## 2017-02-02 DIAGNOSIS — Z83.3 FAMILY HISTORY OF DIABETES MELLITUS: ICD-10-CM

## 2017-02-02 DIAGNOSIS — I10 ESSENTIAL (PRIMARY) HYPERTENSION: ICD-10-CM

## 2017-02-02 DIAGNOSIS — E87.5 HYPERKALEMIA: ICD-10-CM

## 2017-02-02 DIAGNOSIS — Z82.49 FAMILY HISTORY OF ISCHEMIC HEART DISEASE AND OTHER DISEASES OF THE CIRCULATORY SYSTEM: ICD-10-CM

## 2017-02-02 DIAGNOSIS — L03.90 CELLULITIS, UNSPECIFIED: ICD-10-CM

## 2017-02-02 DIAGNOSIS — Z87.891 PERSONAL HISTORY OF NICOTINE DEPENDENCE: ICD-10-CM

## 2017-02-02 PROCEDURE — 86704 HEP B CORE ANTIBODY TOTAL: CPT

## 2017-02-02 PROCEDURE — G0463: CPT

## 2017-02-02 PROCEDURE — 86705 HEP B CORE ANTIBODY IGM: CPT

## 2017-02-02 PROCEDURE — 86803 HEPATITIS C AB TEST: CPT

## 2017-02-02 PROCEDURE — 80053 COMPREHEN METABOLIC PANEL: CPT

## 2017-02-02 PROCEDURE — 85027 COMPLETE CBC AUTOMATED: CPT

## 2017-02-02 PROCEDURE — 84100 ASSAY OF PHOSPHORUS: CPT

## 2017-02-02 PROCEDURE — 83735 ASSAY OF MAGNESIUM: CPT

## 2017-02-03 ENCOUNTER — OTHER (OUTPATIENT)
Age: 57
End: 2017-02-03

## 2017-02-03 ENCOUNTER — EMERGENCY (EMERGENCY)
Facility: HOSPITAL | Age: 57
LOS: 1 days | Discharge: HOME | End: 2017-02-03
Attending: EMERGENCY MEDICINE | Admitting: EMERGENCY MEDICINE
Payer: MEDICAID

## 2017-02-03 VITALS
DIASTOLIC BLOOD PRESSURE: 74 MMHG | HEART RATE: 78 BPM | SYSTOLIC BLOOD PRESSURE: 130 MMHG | OXYGEN SATURATION: 97 % | RESPIRATION RATE: 18 BRPM

## 2017-02-03 VITALS
DIASTOLIC BLOOD PRESSURE: 77 MMHG | OXYGEN SATURATION: 98 % | HEART RATE: 69 BPM | TEMPERATURE: 98 F | WEIGHT: 119.93 LBS | HEIGHT: 62 IN | SYSTOLIC BLOOD PRESSURE: 135 MMHG | RESPIRATION RATE: 18 BRPM

## 2017-02-03 DIAGNOSIS — K31.89 OTHER DISEASES OF STOMACH AND DUODENUM: ICD-10-CM

## 2017-02-03 DIAGNOSIS — D45 POLYCYTHEMIA VERA: ICD-10-CM

## 2017-02-03 DIAGNOSIS — I86.4 GASTRIC VARICES: ICD-10-CM

## 2017-02-03 DIAGNOSIS — N18.4 CHRONIC KIDNEY DISEASE, STAGE 4 (SEVERE): ICD-10-CM

## 2017-02-03 DIAGNOSIS — K86.9 DISEASE OF PANCREAS, UNSPECIFIED: ICD-10-CM

## 2017-02-03 DIAGNOSIS — Z00.00 ENCOUNTER FOR GENERAL ADULT MEDICAL EXAMINATION WITHOUT ABNORMAL FINDINGS: ICD-10-CM

## 2017-02-03 LAB
ALBUMIN SERPL ELPH-MCNC: 3.5 G/DL — SIGNIFICANT CHANGE UP (ref 3.3–5)
ALBUMIN SERPL ELPH-MCNC: 3.6 G/DL — SIGNIFICANT CHANGE UP (ref 3.3–5)
ALP SERPL-CCNC: 180 U/L — HIGH (ref 40–120)
ALP SERPL-CCNC: 183 U/L — HIGH (ref 40–120)
ALT FLD-CCNC: 14 U/L RC — SIGNIFICANT CHANGE UP (ref 10–45)
ALT FLD-CCNC: 9 U/L — LOW (ref 10–45)
ANION GAP SERPL CALC-SCNC: 13 MMOL/L — SIGNIFICANT CHANGE UP (ref 5–17)
ANION GAP SERPL CALC-SCNC: 14 MMOL/L — SIGNIFICANT CHANGE UP (ref 5–17)
ANION GAP SERPL CALC-SCNC: 16 MMOL/L — SIGNIFICANT CHANGE UP (ref 5–17)
ANISOCYTOSIS BLD QL: SLIGHT — SIGNIFICANT CHANGE UP
APPEARANCE UR: CLEAR — SIGNIFICANT CHANGE UP
APTT BLD: 38.9 SEC — HIGH (ref 27.5–37.4)
AST SERPL-CCNC: 17 U/L — SIGNIFICANT CHANGE UP (ref 10–40)
AST SERPL-CCNC: 23 U/L — SIGNIFICANT CHANGE UP (ref 10–40)
BASE EXCESS BLDV CALC-SCNC: -7.9 MMOL/L — LOW (ref -2–2)
BASOPHILS # BLD AUTO: 0.1 K/UL — SIGNIFICANT CHANGE UP (ref 0–0.2)
BASOPHILS NFR BLD AUTO: 0.7 % — SIGNIFICANT CHANGE UP (ref 0–2)
BILIRUB SERPL-MCNC: 0.7 MG/DL — SIGNIFICANT CHANGE UP (ref 0.2–1.2)
BILIRUB SERPL-MCNC: 0.7 MG/DL — SIGNIFICANT CHANGE UP (ref 0.2–1.2)
BILIRUB UR-MCNC: NEGATIVE — SIGNIFICANT CHANGE UP
BUN SERPL-MCNC: 48 MG/DL — HIGH (ref 7–23)
BUN SERPL-MCNC: 52 MG/DL — HIGH (ref 7–23)
BUN SERPL-MCNC: 55 MG/DL — HIGH (ref 7–23)
CA-I SERPL-SCNC: 1.2 MMOL/L — SIGNIFICANT CHANGE UP (ref 1.12–1.3)
CALCIUM SERPL-MCNC: 8.3 MG/DL — LOW (ref 8.4–10.5)
CALCIUM SERPL-MCNC: 8.3 MG/DL — LOW (ref 8.4–10.5)
CALCIUM SERPL-MCNC: 8.7 MG/DL — SIGNIFICANT CHANGE UP (ref 8.4–10.5)
CHLORIDE BLDV-SCNC: 113 MMOL/L — HIGH (ref 96–108)
CHLORIDE SERPL-SCNC: 106 MMOL/L — SIGNIFICANT CHANGE UP (ref 96–108)
CHLORIDE SERPL-SCNC: 106 MMOL/L — SIGNIFICANT CHANGE UP (ref 96–108)
CHLORIDE SERPL-SCNC: 107 MMOL/L — SIGNIFICANT CHANGE UP (ref 96–108)
CO2 BLDV-SCNC: 20 MMOL/L — LOW (ref 22–30)
CO2 SERPL-SCNC: 18 MMOL/L — LOW (ref 22–31)
COLOR SPEC: SIGNIFICANT CHANGE UP
CREAT SERPL-MCNC: 3.25 MG/DL — HIGH (ref 0.5–1.3)
CREAT SERPL-MCNC: 3.29 MG/DL — HIGH (ref 0.5–1.3)
CREAT SERPL-MCNC: 3.71 MG/DL — HIGH (ref 0.5–1.3)
DACRYOCYTES BLD QL SMEAR: SLIGHT — SIGNIFICANT CHANGE UP
DIFF PNL FLD: ABNORMAL
ELLIPTOCYTES BLD QL SMEAR: SLIGHT — SIGNIFICANT CHANGE UP
EOSINOPHIL # BLD AUTO: 0.4 K/UL — SIGNIFICANT CHANGE UP (ref 0–0.5)
EOSINOPHIL NFR BLD AUTO: 2.3 % — SIGNIFICANT CHANGE UP (ref 0–6)
GAS PNL BLDV: 136 MMOL/L — SIGNIFICANT CHANGE UP (ref 136–145)
GAS PNL BLDV: SIGNIFICANT CHANGE UP
GAS PNL BLDV: SIGNIFICANT CHANGE UP
GLUCOSE BLDV-MCNC: 115 MG/DL — HIGH (ref 70–99)
GLUCOSE SERPL-MCNC: 108 MG/DL — HIGH (ref 70–99)
GLUCOSE SERPL-MCNC: 116 MG/DL — HIGH (ref 70–99)
GLUCOSE SERPL-MCNC: 129 MG/DL — HIGH (ref 70–99)
GLUCOSE UR QL: 50
HBV CORE AB SER-ACNC: SIGNIFICANT CHANGE UP
HBV CORE IGM SER-ACNC: SIGNIFICANT CHANGE UP
HCO3 BLDV-SCNC: 18 MMOL/L — LOW (ref 21–29)
HCT VFR BLD CALC: 47 % — HIGH (ref 34.5–45)
HCT VFR BLD CALC: 49.1 % — HIGH (ref 34.5–45)
HCT VFR BLDA CALC: 45 % — SIGNIFICANT CHANGE UP (ref 39–50)
HCV AB S/CO SERPL IA: 0.2 S/CO — SIGNIFICANT CHANGE UP
HCV AB SERPL-IMP: SIGNIFICANT CHANGE UP
HGB BLD CALC-MCNC: 14.7 G/DL — SIGNIFICANT CHANGE UP (ref 11.5–15.5)
HGB BLD-MCNC: 14 G/DL — SIGNIFICANT CHANGE UP (ref 11.5–15.5)
HGB BLD-MCNC: 14.6 G/DL — SIGNIFICANT CHANGE UP (ref 11.5–15.5)
INR BLD: 1.16 RATIO — SIGNIFICANT CHANGE UP (ref 0.88–1.16)
KETONES UR-MCNC: NEGATIVE — SIGNIFICANT CHANGE UP
LACTATE BLDV-MCNC: 1.4 MMOL/L — SIGNIFICANT CHANGE UP (ref 0.7–2)
LEUKOCYTE ESTERASE UR-ACNC: NEGATIVE — SIGNIFICANT CHANGE UP
LYMPHOCYTES # BLD AUTO: 11.3 % — LOW (ref 13–44)
LYMPHOCYTES # BLD AUTO: 2.2 K/UL — SIGNIFICANT CHANGE UP (ref 1–3.3)
MAGNESIUM SERPL-MCNC: 2.1 MG/DL — SIGNIFICANT CHANGE UP (ref 1.6–2.6)
MAGNESIUM SERPL-MCNC: 2.2 MG/DL — SIGNIFICANT CHANGE UP (ref 1.6–2.6)
MANUAL SMEAR VERIFICATION: SIGNIFICANT CHANGE UP
MCHC RBC-ENTMCNC: 21.2 PG — LOW (ref 27–34)
MCHC RBC-ENTMCNC: 21.7 PG — LOW (ref 27–34)
MCHC RBC-ENTMCNC: 29.7 GM/DL — LOW (ref 32–36)
MCHC RBC-ENTMCNC: 29.7 GM/DL — LOW (ref 32–36)
MCV RBC AUTO: 71.2 FL — LOW (ref 80–100)
MCV RBC AUTO: 73.1 FL — LOW (ref 80–100)
MONOCYTES # BLD AUTO: 0.8 K/UL — SIGNIFICANT CHANGE UP (ref 0–0.9)
MONOCYTES NFR BLD AUTO: 3.9 % — SIGNIFICANT CHANGE UP (ref 2–14)
NEUTROPHILS # BLD AUTO: 15.9 K/UL — HIGH (ref 1.8–7.4)
NEUTROPHILS NFR BLD AUTO: 81.8 % — HIGH (ref 43–77)
NITRITE UR-MCNC: NEGATIVE — SIGNIFICANT CHANGE UP
PCO2 BLDV: 42 MMHG — SIGNIFICANT CHANGE UP (ref 35–50)
PH BLDV: 7.27 — LOW (ref 7.35–7.45)
PH UR: 6.5 — SIGNIFICANT CHANGE UP (ref 4.8–8)
PHOSPHATE SERPL-MCNC: 4.4 MG/DL — SIGNIFICANT CHANGE UP (ref 2.5–4.5)
PHOSPHATE SERPL-MCNC: 5.4 MG/DL — HIGH (ref 2.5–4.5)
PLAT MORPH BLD: NORMAL — SIGNIFICANT CHANGE UP
PLAT MORPH BLD: SIGNIFICANT CHANGE UP
PLATELET # BLD AUTO: 546 K/UL — HIGH (ref 150–400)
PLATELET # BLD AUTO: 621 K/UL — HIGH (ref 150–400)
PO2 BLDV: 46 MMHG — HIGH (ref 25–45)
POIKILOCYTOSIS BLD QL AUTO: SLIGHT — SIGNIFICANT CHANGE UP
POLYCHROMASIA BLD QL SMEAR: SLIGHT — SIGNIFICANT CHANGE UP
POTASSIUM BLDV-SCNC: 5.2 MMOL/L — HIGH (ref 3.5–5)
POTASSIUM SERPL-MCNC: 4.4 MMOL/L — SIGNIFICANT CHANGE UP (ref 3.5–5.3)
POTASSIUM SERPL-MCNC: 5 MMOL/L — SIGNIFICANT CHANGE UP (ref 3.5–5.3)
POTASSIUM SERPL-MCNC: 6.3 MMOL/L — CRITICAL HIGH (ref 3.5–5.3)
POTASSIUM SERPL-SCNC: 4.4 MMOL/L — SIGNIFICANT CHANGE UP (ref 3.5–5.3)
POTASSIUM SERPL-SCNC: 5 MMOL/L — SIGNIFICANT CHANGE UP (ref 3.5–5.3)
POTASSIUM SERPL-SCNC: 6.3 MMOL/L — CRITICAL HIGH (ref 3.5–5.3)
PROT SERPL-MCNC: 6.9 G/DL — SIGNIFICANT CHANGE UP (ref 6–8.3)
PROT SERPL-MCNC: 7.7 G/DL — SIGNIFICANT CHANGE UP (ref 6–8.3)
PROT UR-MCNC: 150 MG/DL
PROTHROM AB SERPL-ACNC: 12.6 SEC — SIGNIFICANT CHANGE UP (ref 10–13.1)
RBC # BLD: 6.6 M/UL — HIGH (ref 3.8–5.2)
RBC # BLD: 6.72 M/UL — HIGH (ref 3.8–5.2)
RBC # FLD: 19.9 % — HIGH (ref 10.3–14.5)
RBC # FLD: 21.1 % — HIGH (ref 10.3–14.5)
RBC BLD AUTO: ABNORMAL
RBC BLD AUTO: SIGNIFICANT CHANGE UP
RBC CASTS # UR COMP ASSIST: SIGNIFICANT CHANGE UP /HPF (ref 0–2)
SAO2 % BLDV: 75 % — SIGNIFICANT CHANGE UP (ref 67–88)
SODIUM SERPL-SCNC: 138 MMOL/L — SIGNIFICANT CHANGE UP (ref 135–145)
SODIUM SERPL-SCNC: 138 MMOL/L — SIGNIFICANT CHANGE UP (ref 135–145)
SODIUM SERPL-SCNC: 140 MMOL/L — SIGNIFICANT CHANGE UP (ref 135–145)
SP GR SPEC: 1.01 — SIGNIFICANT CHANGE UP (ref 1.01–1.02)
TARGETS BLD QL SMEAR: SLIGHT — SIGNIFICANT CHANGE UP
TROPONIN T SERPL-MCNC: <0.01 NG/ML — SIGNIFICANT CHANGE UP (ref 0–0.06)
UROBILINOGEN FLD QL: NEGATIVE — SIGNIFICANT CHANGE UP
WBC # BLD: 19.4 K/UL — HIGH (ref 3.8–10.5)
WBC # BLD: 23.67 K/UL — HIGH (ref 3.8–10.5)
WBC # FLD AUTO: 19.4 K/UL — HIGH (ref 3.8–10.5)
WBC # FLD AUTO: 23.67 K/UL — HIGH (ref 3.8–10.5)
WBC UR QL: SIGNIFICANT CHANGE UP /HPF (ref 0–5)

## 2017-02-03 PROCEDURE — 96374 THER/PROPH/DIAG INJ IV PUSH: CPT

## 2017-02-03 PROCEDURE — 82947 ASSAY GLUCOSE BLOOD QUANT: CPT

## 2017-02-03 PROCEDURE — 80053 COMPREHEN METABOLIC PANEL: CPT

## 2017-02-03 PROCEDURE — 85014 HEMATOCRIT: CPT

## 2017-02-03 PROCEDURE — 84132 ASSAY OF SERUM POTASSIUM: CPT

## 2017-02-03 PROCEDURE — 94640 AIRWAY INHALATION TREATMENT: CPT

## 2017-02-03 PROCEDURE — 85730 THROMBOPLASTIN TIME PARTIAL: CPT

## 2017-02-03 PROCEDURE — 99285 EMERGENCY DEPT VISIT HI MDM: CPT | Mod: 25

## 2017-02-03 PROCEDURE — 82435 ASSAY OF BLOOD CHLORIDE: CPT

## 2017-02-03 PROCEDURE — 82330 ASSAY OF CALCIUM: CPT

## 2017-02-03 PROCEDURE — 82803 BLOOD GASES ANY COMBINATION: CPT

## 2017-02-03 PROCEDURE — 99285 EMERGENCY DEPT VISIT HI MDM: CPT | Mod: GC

## 2017-02-03 PROCEDURE — 71010: CPT | Mod: 26

## 2017-02-03 PROCEDURE — 93005 ELECTROCARDIOGRAM TRACING: CPT

## 2017-02-03 PROCEDURE — 71045 X-RAY EXAM CHEST 1 VIEW: CPT

## 2017-02-03 PROCEDURE — 83735 ASSAY OF MAGNESIUM: CPT

## 2017-02-03 PROCEDURE — 80048 BASIC METABOLIC PNL TOTAL CA: CPT

## 2017-02-03 PROCEDURE — 93010 ELECTROCARDIOGRAM REPORT: CPT

## 2017-02-03 PROCEDURE — 96375 TX/PRO/DX INJ NEW DRUG ADDON: CPT

## 2017-02-03 PROCEDURE — 84295 ASSAY OF SERUM SODIUM: CPT

## 2017-02-03 PROCEDURE — 81001 URINALYSIS AUTO W/SCOPE: CPT

## 2017-02-03 PROCEDURE — 84484 ASSAY OF TROPONIN QUANT: CPT

## 2017-02-03 PROCEDURE — 85610 PROTHROMBIN TIME: CPT

## 2017-02-03 PROCEDURE — 83605 ASSAY OF LACTIC ACID: CPT

## 2017-02-03 PROCEDURE — 99284 EMERGENCY DEPT VISIT MOD MDM: CPT | Mod: 25

## 2017-02-03 PROCEDURE — 84100 ASSAY OF PHOSPHORUS: CPT

## 2017-02-03 PROCEDURE — 85027 COMPLETE CBC AUTOMATED: CPT

## 2017-02-03 RX ORDER — SODIUM CHLORIDE 9 MG/ML
1000 INJECTION INTRAMUSCULAR; INTRAVENOUS; SUBCUTANEOUS ONCE
Qty: 0 | Refills: 0 | Status: COMPLETED | OUTPATIENT
Start: 2017-02-03 | End: 2017-02-03

## 2017-02-03 RX ORDER — SODIUM BICARBONATE 1 MEQ/ML
50 SYRINGE (ML) INTRAVENOUS ONCE
Qty: 0 | Refills: 0 | Status: COMPLETED | OUTPATIENT
Start: 2017-02-03 | End: 2017-02-03

## 2017-02-03 RX ORDER — CALCIUM GLUCONATE 100 MG/ML
1 VIAL (ML) INTRAVENOUS ONCE
Qty: 0 | Refills: 0 | Status: COMPLETED | OUTPATIENT
Start: 2017-02-03 | End: 2017-02-03

## 2017-02-03 RX ORDER — ALBUTEROL 90 UG/1
2.5 AEROSOL, METERED ORAL
Qty: 0 | Refills: 0 | Status: COMPLETED | OUTPATIENT
Start: 2017-02-03 | End: 2017-02-03

## 2017-02-03 RX ADMIN — Medication 200 GRAM(S): at 13:31

## 2017-02-03 RX ADMIN — ALBUTEROL 2.5 MILLIGRAM(S): 90 AEROSOL, METERED ORAL at 13:20

## 2017-02-03 RX ADMIN — ALBUTEROL 2.5 MILLIGRAM(S): 90 AEROSOL, METERED ORAL at 13:40

## 2017-02-03 RX ADMIN — ALBUTEROL 2.5 MILLIGRAM(S): 90 AEROSOL, METERED ORAL at 14:00

## 2017-02-03 RX ADMIN — SODIUM CHLORIDE 2000 MILLILITER(S): 9 INJECTION INTRAMUSCULAR; INTRAVENOUS; SUBCUTANEOUS at 13:27

## 2017-02-03 RX ADMIN — Medication 50 MILLIEQUIVALENT(S): at 13:28

## 2017-02-03 NOTE — ED ADULT NURSE NOTE - OBJECTIVE STATEMENT
55 y/o F, reported to ED from home. A&Ox3, c/o abnormal labs. Pt reports that she was DC from the hospital 1 week ago and followed up with the clinic. Pt was called this morning and told to go to the ED because her potassium was very high. Pt denies any pain. Pt denies LOC, H/A, SOB, C/P, N/V/D, abd pain. Pt denies fever or chills. Pt reports being admitted for cellulitis and was told that she has cirrhosis. Pt reports that her LLQ feels bloated and she thinks that her spleen is enlarged as a result. Will continue to monitor pt.

## 2017-02-03 NOTE — ED PROVIDER NOTE - OBJECTIVE STATEMENT
55 yo F with polycythemia vera,  CKD IV (unclear etiology, recent kidney biopsy shows sclerosis), HTN, pancreatic mass who presents to the hospital from her PMH office for hyperkalemia.  Patient denies any symptoms at this time.  She saw her PMD for routine postdischarge followup and was incidentally found to be hyperkalemic on BMP.  No chest pain 55 yo F with polycythemia vera,  CKD IV (unclear etiology, recent kidney biopsy shows sclerosis), HTN, pancreatic mass who presents to the hospital from her PMH office for hyperkalemia.  Patient denies any symptoms at this time.  She saw her PMD for routine postdischarge followup and was incidentally found to be hyperkalemic on BMP.  No chest pain, palpitations.

## 2017-02-03 NOTE — ED PROVIDER NOTE - PROGRESS NOTE DETAILS
Dr. Covington Note: currently without significant hyperkalemia, vitals stable, will serial troponin and call nephrologist for recommendations, likely for dc. Dr. Covington Note: nephrologist fellow saw patient, agrees with no intervention, stable for dc after second trop. Dr. Murrieta: Received in sign out. rpt potassium and trop are WNL. DC with instructions for low Potassium diet.

## 2017-02-03 NOTE — ED PROVIDER NOTE - MEDICAL DECISION MAKING DETAILS
Pt presenting from PCP for hyperkalemia,  No EKG changes.  Will treat with Ca-gluconate,  sodium bic Pt presenting from PCP for hyperkalemia,  No EKG changes.  Will treat with Ca-gluconate,  sodium bicarb, IV fluid, albuterol.  Will repeat labs.

## 2017-02-03 NOTE — ED PROVIDER NOTE - CARE PLAN
Principal Discharge DX:	Hyperkalemia  Secondary Diagnosis:	Chronic renal insufficiency Principal Discharge DX:	Hyperkalemia  Secondary Diagnosis:	Chronic renal insufficiency  Secondary Diagnosis:	Polycythemia vera

## 2017-02-03 NOTE — ED ADULT NURSE REASSESSMENT NOTE - NS ED NURSE REASSESS COMMENT FT1
RN gave pt Sodium Bicarb and she began saying that she felt something weird in her neck. Pt then said that she felt some numbness in her lips. Will continue to monitor pt. Nadya BAH made aware that pt is having some tingling/numbness in her lips.
Repeat bloods sent to the lab. Will continue to monitor pt.

## 2017-02-03 NOTE — ED PROVIDER NOTE - ATTENDING CONTRIBUTION TO CARE
I, Dr. Ervin Covington, interviewed the patient and performed a physical exam and spoke to the resident about the plan of care for this patient.  Pt sent for outpt labs showing high potassium.  Pt reports chest pain last night, no syncope, palpitations, vomiting, diarrhea.  Appears well on exam.

## 2017-02-07 DIAGNOSIS — I12.9 HYPERTENSIVE CHRONIC KIDNEY DISEASE WITH STAGE 1 THROUGH STAGE 4 CHRONIC KIDNEY DISEASE, OR UNSPECIFIED CHRONIC KIDNEY DISEASE: ICD-10-CM

## 2017-02-07 DIAGNOSIS — N18.9 CHRONIC KIDNEY DISEASE, UNSPECIFIED: ICD-10-CM

## 2017-02-07 DIAGNOSIS — E87.5 HYPERKALEMIA: ICD-10-CM

## 2017-02-07 DIAGNOSIS — D45 POLYCYTHEMIA VERA: ICD-10-CM

## 2017-02-13 ENCOUNTER — LABORATORY RESULT (OUTPATIENT)
Age: 57
End: 2017-02-13

## 2017-02-13 ENCOUNTER — MED ADMIN CHARGE (OUTPATIENT)
Age: 57
End: 2017-02-13

## 2017-02-14 ENCOUNTER — EMERGENCY (EMERGENCY)
Facility: HOSPITAL | Age: 57
LOS: 1 days | Discharge: HOME | End: 2017-02-14
Attending: EMERGENCY MEDICINE | Admitting: EMERGENCY MEDICINE
Payer: MEDICAID

## 2017-02-14 VITALS
OXYGEN SATURATION: 100 % | SYSTOLIC BLOOD PRESSURE: 150 MMHG | HEART RATE: 82 BPM | TEMPERATURE: 98 F | DIASTOLIC BLOOD PRESSURE: 85 MMHG | RESPIRATION RATE: 17 BRPM

## 2017-02-14 VITALS
HEART RATE: 88 BPM | OXYGEN SATURATION: 100 % | DIASTOLIC BLOOD PRESSURE: 74 MMHG | RESPIRATION RATE: 18 BRPM | SYSTOLIC BLOOD PRESSURE: 144 MMHG

## 2017-02-14 DIAGNOSIS — N18.9 CHRONIC KIDNEY DISEASE, UNSPECIFIED: ICD-10-CM

## 2017-02-14 DIAGNOSIS — I12.9 HYPERTENSIVE CHRONIC KIDNEY DISEASE WITH STAGE 1 THROUGH STAGE 4 CHRONIC KIDNEY DISEASE, OR UNSPECIFIED CHRONIC KIDNEY DISEASE: ICD-10-CM

## 2017-02-14 DIAGNOSIS — M79.622 PAIN IN LEFT UPPER ARM: ICD-10-CM

## 2017-02-14 LAB
GAS PNL BLDV: SIGNIFICANT CHANGE UP
SURGICAL PATHOLOGY STUDY: SIGNIFICANT CHANGE UP

## 2017-02-14 PROCEDURE — 82947 ASSAY GLUCOSE BLOOD QUANT: CPT

## 2017-02-14 PROCEDURE — 83735 ASSAY OF MAGNESIUM: CPT

## 2017-02-14 PROCEDURE — 83605 ASSAY OF LACTIC ACID: CPT

## 2017-02-14 PROCEDURE — 84132 ASSAY OF SERUM POTASSIUM: CPT

## 2017-02-14 PROCEDURE — 99284 EMERGENCY DEPT VISIT MOD MDM: CPT

## 2017-02-14 PROCEDURE — 82330 ASSAY OF CALCIUM: CPT

## 2017-02-14 PROCEDURE — 85014 HEMATOCRIT: CPT

## 2017-02-14 PROCEDURE — 80053 COMPREHEN METABOLIC PANEL: CPT

## 2017-02-14 PROCEDURE — 84295 ASSAY OF SERUM SODIUM: CPT

## 2017-02-14 PROCEDURE — 82803 BLOOD GASES ANY COMBINATION: CPT

## 2017-02-14 PROCEDURE — 85027 COMPLETE CBC AUTOMATED: CPT

## 2017-02-14 PROCEDURE — 82435 ASSAY OF BLOOD CHLORIDE: CPT

## 2017-02-14 RX ORDER — SODIUM CHLORIDE 9 MG/ML
1000 INJECTION INTRAMUSCULAR; INTRAVENOUS; SUBCUTANEOUS ONCE
Qty: 0 | Refills: 0 | Status: COMPLETED | OUTPATIENT
Start: 2017-02-14 | End: 2017-02-14

## 2017-02-14 RX ORDER — SODIUM CHLORIDE 9 MG/ML
1000 INJECTION INTRAMUSCULAR; INTRAVENOUS; SUBCUTANEOUS ONCE
Qty: 0 | Refills: 0 | Status: DISCONTINUED | OUTPATIENT
Start: 2017-02-14 | End: 2017-02-18

## 2017-02-14 RX ADMIN — SODIUM CHLORIDE 2000 MILLILITER(S): 9 INJECTION INTRAMUSCULAR; INTRAVENOUS; SUBCUTANEOUS at 09:56

## 2017-02-14 NOTE — ED PROVIDER NOTE - PROGRESS NOTE DETAILS
Lactate improved w/ hydration. Pt feels better. Encouraged to take her meds as prescribed. Will f/u with primary care physician this week.

## 2017-02-14 NOTE — ED PROVIDER NOTE - OBJECTIVE STATEMENT
57 y/o F w/ hx of polycythemia vera and CKD presents after primary care physician angelito labs yesterday and found elevated potassium and creatinine. Feels well. Notes L. upper arm pain after receiving vaccine injection in arm yesterday. Denies cough, fever, chills, n/v/d, or CP.

## 2017-02-14 NOTE — ED PROVIDER NOTE - ATTENDING CONTRIBUTION TO CARE
Private Physician ZULMA,  56y female pmh P.Vera, CKD unknown ethiology. PUD, Pancreatic mass, Pt was seen by pmd yesterday had labs drawn with Potassium of 6.2, No chest pain shortness of breath, no dizzyness. no nvdc, No fever and chills. Not on dialysis, recent renal bx. PE WDWN NCAT chest clear anterior & posterior abd soft neuro no focal defects. Cv no rubs, gallops or murmurs.  Deangelo Hart MD, Facep

## 2017-02-14 NOTE — ED ADULT NURSE NOTE - OBJECTIVE STATEMENT
55yo f a&ox4 ambulated into ED c/o hyperk at clinic. as per pt, she was called in the AM at 0200, told K is 6.2 and creatinine was 3.8. pt denies spasms, denies cp, denies numbness/tingling. pt states she went in for routine blood work and has been having dull soreness in L shoulder for ~1month.

## 2017-02-14 NOTE — ED PROVIDER NOTE - MEDICAL DECISION MAKING DETAILS
57 y/o F sent from primary care physician after outside labs demonstrated hyperkalemia, pt asymptomatic. Plan: ECG, labs, if hyperkalemic, treat.

## 2017-02-14 NOTE — ED PROVIDER NOTE - PHYSICAL EXAMINATION
Gen: NAD  Eyes: PERRL, EOMI. sclerae white, no icterus  ENT: Moist mucous membranes. No exudates  Neck: supple, no LAD, mass or goiter, trachea midline  CV: RRR. Audible S1 and S2. No murmurs, rubs, gallops, S3, nor S4  Pulm: Clear to auscultation bilaterally. No wheezes, rales, or rhonchi  Abd: BS+, nondistended, No tenderness to palpation  Musculoskeletal:  L. tricep muscle mildly ttp  Skin: no lesions or scars noted  Psych: mood good, affect full range and congruent with mood.  Neurologic: AAOx3, Cranial nerves grossly intact

## 2017-02-14 NOTE — ED PROVIDER NOTE - ST/T WAVE
no hyperacute TW, no ST changes.TWI in AvR, V1, V2. Old in AvR and V1, new in V2 but is likely lead placement as looks simiilar to toher

## 2017-02-15 LAB
ANION GAP SERPL CALC-SCNC: 16 MMOL/L
BASOPHILS # BLD AUTO: 0.13 K/UL
BASOPHILS NFR BLD AUTO: 0.7 %
BUN SERPL-MCNC: 30 MG/DL
CALCIUM SERPL-MCNC: 8.6 MG/DL
CHLORIDE SERPL-SCNC: 108 MMOL/L
CO2 SERPL-SCNC: 18 MMOL/L
CREAT SERPL-MCNC: 3.8 MG/DL
EOSINOPHIL # BLD AUTO: 0.4 K/UL
EOSINOPHIL NFR BLD AUTO: 2 %
GLUCOSE SERPL-MCNC: 98 MG/DL
HCT VFR BLD CALC: 45.9 %
HGB BLD-MCNC: 14.1 G/DL
IMM GRANULOCYTES NFR BLD AUTO: 0.4 %
LYMPHOCYTES # BLD AUTO: 1.53 K/UL
LYMPHOCYTES NFR BLD AUTO: 7.8 %
MAN DIFF?: NORMAL
MCHC RBC-ENTMCNC: 22 PG
MCHC RBC-ENTMCNC: 30.7 GM/DL
MCV RBC AUTO: 71.6 FL
MONOCYTES # BLD AUTO: 0.65 K/UL
MONOCYTES NFR BLD AUTO: 3.3 %
NEUTROPHILS # BLD AUTO: 16.87 K/UL
NEUTROPHILS NFR BLD AUTO: 85.8 %
PLATELET # BLD AUTO: 393 K/UL
POTASSIUM SERPL-SCNC: 6.4 MMOL/L
RBC # BLD: 6.41 M/UL
RBC # FLD: 20.6 %
SODIUM SERPL-SCNC: 142 MMOL/L
WBC # FLD AUTO: 19.66 K/UL

## 2017-03-05 ENCOUNTER — RESULT CHARGE (OUTPATIENT)
Age: 57
End: 2017-03-05

## 2017-03-06 ENCOUNTER — APPOINTMENT (OUTPATIENT)
Dept: INTERNAL MEDICINE | Facility: CLINIC | Age: 57
End: 2017-03-06

## 2017-03-06 ENCOUNTER — OUTPATIENT (OUTPATIENT)
Dept: OUTPATIENT SERVICES | Facility: HOSPITAL | Age: 57
LOS: 1 days | End: 2017-03-06
Payer: SELF-PAY

## 2017-03-06 VITALS — HEART RATE: 68 BPM | DIASTOLIC BLOOD PRESSURE: 72 MMHG | TEMPERATURE: 98.1 F | SYSTOLIC BLOOD PRESSURE: 122 MMHG

## 2017-03-06 VITALS — SYSTOLIC BLOOD PRESSURE: 140 MMHG | DIASTOLIC BLOOD PRESSURE: 80 MMHG

## 2017-03-06 DIAGNOSIS — I10 ESSENTIAL (PRIMARY) HYPERTENSION: ICD-10-CM

## 2017-03-06 DIAGNOSIS — K86.89 OTHER SPECIFIED DISEASES OF PANCREAS: ICD-10-CM

## 2017-03-06 DIAGNOSIS — K31.89 OTHER DISEASES OF STOMACH AND DUODENUM: ICD-10-CM

## 2017-03-06 PROCEDURE — 93005 ELECTROCARDIOGRAM TRACING: CPT

## 2017-03-06 PROCEDURE — G0463: CPT

## 2017-03-07 DIAGNOSIS — E87.5 HYPERKALEMIA: ICD-10-CM

## 2017-03-07 DIAGNOSIS — D45 POLYCYTHEMIA VERA: ICD-10-CM

## 2017-03-07 DIAGNOSIS — N18.4 CHRONIC KIDNEY DISEASE, STAGE 4 (SEVERE): ICD-10-CM

## 2017-03-08 ENCOUNTER — OUTPATIENT (OUTPATIENT)
Dept: OUTPATIENT SERVICES | Facility: HOSPITAL | Age: 57
LOS: 1 days | End: 2017-03-08
Payer: SELF-PAY

## 2017-03-08 ENCOUNTER — LABORATORY RESULT (OUTPATIENT)
Age: 57
End: 2017-03-08

## 2017-03-08 ENCOUNTER — APPOINTMENT (OUTPATIENT)
Dept: NEPHROLOGY | Facility: CLINIC | Age: 57
End: 2017-03-08
Payer: MEDICAID

## 2017-03-08 VITALS
DIASTOLIC BLOOD PRESSURE: 86 MMHG | OXYGEN SATURATION: 97 % | HEART RATE: 76 BPM | WEIGHT: 119.05 LBS | HEIGHT: 61 IN | SYSTOLIC BLOOD PRESSURE: 132 MMHG | BODY MASS INDEX: 22.48 KG/M2

## 2017-03-08 VITALS — DIASTOLIC BLOOD PRESSURE: 80 MMHG | SYSTOLIC BLOOD PRESSURE: 128 MMHG

## 2017-03-08 PROCEDURE — G0463: CPT

## 2017-03-08 PROCEDURE — 99215 OFFICE O/P EST HI 40 MIN: CPT | Mod: GC

## 2017-03-08 RX ORDER — NEBIVOLOL HYDROCHLORIDE 5 MG/1
5 TABLET ORAL
Refills: 0 | Status: DISCONTINUED | COMMUNITY
Start: 2017-02-02 | End: 2017-03-08

## 2017-03-09 LAB
25(OH)D3 SERPL-MCNC: 9.3 NG/ML
ALBUMIN SERPL ELPH-MCNC: 4 G/DL
ANION GAP SERPL CALC-SCNC: 16 MMOL/L
APPEARANCE: CLEAR
BACTERIA: NEGATIVE
BASOPHILS # BLD AUTO: 0 K/UL
BASOPHILS NFR BLD AUTO: 0 %
BILIRUBIN URINE: NEGATIVE
BLOOD URINE: ABNORMAL
BUN SERPL-MCNC: 38 MG/DL
C3 SERPL-MCNC: 85 MG/DL
C4 SERPL-MCNC: 28 MG/DL
CALCIUM SERPL-MCNC: 8.8 MG/DL
CALCIUM SERPL-MCNC: 8.8 MG/DL
CHLORIDE SERPL-SCNC: 106 MMOL/L
CO2 SERPL-SCNC: 18 MMOL/L
COLOR: YELLOW
CREAT SERPL-MCNC: 3.55 MG/DL
CREAT SPEC-SCNC: 76 MG/DL
CREAT/PROT UR: 6.9 RATIO
DEPRECATED KAPPA LC FREE/LAMBDA SER: 1.06 RATIO
DSDNA AB SER-ACNC: <12 IU/ML
ENA RNP AB SER IA-ACNC: <0.2 AL
ENA SCL70 IGG SER IA-ACNC: <0.2 AL
ENA SM AB SER IA-ACNC: <0.2 AL
ENA SS-A AB SER IA-ACNC: <0.2 AL
ENA SS-B AB SER IA-ACNC: <0.2 AL
EOSINOPHIL # BLD AUTO: 1.03 K/UL
EOSINOPHIL NFR BLD AUTO: 6.4 %
GLUCOSE QUALITATIVE U: 100 MG/DL
GLUCOSE SERPL-MCNC: 120 MG/DL
HBA1C MFR BLD HPLC: 5.6 %
HBV CORE IGG+IGM SER QL: NONREACTIVE
HBV SURFACE AB SER QL: NONREACTIVE
HBV SURFACE AB SERPL IA-ACNC: <3 MIU/ML
HBV SURFACE AG SER QL: NONREACTIVE
HCT VFR BLD CALC: 49.3 %
HCV AB SER QL: NONREACTIVE
HCV S/CO RATIO: 0.21 S/CO
HGB BLD-MCNC: 14.8 G/DL
HYALINE CASTS: 3 /LPF
KAPPA LC CSF-MCNC: 14.4 MG/DL
KAPPA LC SERPL-MCNC: 15.2 MG/DL
KETONES URINE: NEGATIVE
LEUKOCYTE ESTERASE URINE: NEGATIVE
LYMPHOCYTES # BLD AUTO: 1.32 K/UL
LYMPHOCYTES NFR BLD AUTO: 8.2 %
MAN DIFF?: NORMAL
MCHC RBC-ENTMCNC: 21.2 PG
MCHC RBC-ENTMCNC: 30 GM/DL
MCV RBC AUTO: 70.7 FL
MICROSCOPIC-UA: NORMAL
MONOCYTES # BLD AUTO: 0 K/UL
MONOCYTES NFR BLD AUTO: 0 %
NEUTROPHILS # BLD AUTO: 13.81 K/UL
NEUTROPHILS NFR BLD AUTO: 78.2 %
NITRITE URINE: NEGATIVE
PARATHYROID HORMONE INTACT: 220 PG/ML
PH URINE: 6
PHOSPHATE SERPL-MCNC: 5.1 MG/DL
PLATELET # BLD AUTO: 521 K/UL
POTASSIUM SERPL-SCNC: 5.4 MMOL/L
PROT UR-MCNC: 524 MG/DL
PROTEIN URINE: 300 MG/DL
RBC # BLD: 6.97 M/UL
RBC # FLD: 19.8 %
RED BLOOD CELLS URINE: 10 /HPF
RPR SER-TITR: NORMAL
SODIUM SERPL-SCNC: 140 MMOL/L
SPECIFIC GRAVITY URINE: 1.02
SQUAMOUS EPITHELIAL CELLS: 2 /HPF
UROBILINOGEN URINE: NORMAL MG/DL
WBC # FLD AUTO: 16.15 K/UL
WHITE BLOOD CELLS URINE: 13 /HPF

## 2017-03-10 ENCOUNTER — OUTPATIENT (OUTPATIENT)
Dept: OUTPATIENT SERVICES | Facility: HOSPITAL | Age: 57
LOS: 1 days | Discharge: HOME | End: 2017-03-10

## 2017-03-10 DIAGNOSIS — D75.1 SECONDARY POLYCYTHEMIA: ICD-10-CM

## 2017-03-10 LAB
ALBUMIN MFR SERPL ELPH: 50.5 %
ALBUMIN SERPL-MCNC: 3.8 G/DL
ALBUMIN/GLOB SERPL: 1 RATIO
ALPHA1 GLOB MFR SERPL ELPH: 4.7 %
ALPHA1 GLOB SERPL ELPH-MCNC: 0.4 G/DL
ALPHA2 GLOB MFR SERPL ELPH: 7.7 %
ALPHA2 GLOB SERPL ELPH-MCNC: 0.6 G/DL
ANA SER IF-ACNC: NEGATIVE
B-GLOBULIN MFR SERPL ELPH: 11.6 %
B-GLOBULIN SERPL ELPH-MCNC: 0.9 G/DL
GAMMA GLOB FLD ELPH-MCNC: 1.9 G/DL
GAMMA GLOB MFR SERPL ELPH: 25.5 %
HCV RNA SERPL NAA DL=5-ACNC: NOT DETECTED IU/ML
HCV RNA SERPL NAA+PROBE-LOG IU: NOT DETECTED LOGIU/ML
INTERPRETATION SERPL IEP-IMP: NORMAL
PROT SERPL-MCNC: 7.6 G/DL
PROT SERPL-MCNC: 7.6 G/DL

## 2017-03-12 ENCOUNTER — RESULT REVIEW (OUTPATIENT)
Age: 57
End: 2017-03-12

## 2017-03-12 PROCEDURE — 84443 ASSAY THYROID STIM HORMONE: CPT

## 2017-03-12 PROCEDURE — 82330 ASSAY OF CALCIUM: CPT

## 2017-03-12 PROCEDURE — 80048 BASIC METABOLIC PNL TOTAL CA: CPT

## 2017-03-12 PROCEDURE — 87522 HEPATITIS C REVRS TRNSCRPJ: CPT

## 2017-03-12 PROCEDURE — 87902 NFCT AGT GNTYP ALYS HEP C: CPT

## 2017-03-12 PROCEDURE — 84132 ASSAY OF SERUM POTASSIUM: CPT

## 2017-03-12 PROCEDURE — 84156 ASSAY OF PROTEIN URINE: CPT

## 2017-03-12 PROCEDURE — 99285 EMERGENCY DEPT VISIT HI MDM: CPT | Mod: 25

## 2017-03-12 PROCEDURE — 84100 ASSAY OF PHOSPHORUS: CPT

## 2017-03-12 PROCEDURE — 82947 ASSAY GLUCOSE BLOOD QUANT: CPT

## 2017-03-12 PROCEDURE — 88305 TISSUE EXAM BY PATHOLOGIST: CPT

## 2017-03-12 PROCEDURE — 93005 ELECTROCARDIOGRAM TRACING: CPT

## 2017-03-12 PROCEDURE — 87389 HIV-1 AG W/HIV-1&-2 AB AG IA: CPT

## 2017-03-12 PROCEDURE — 82570 ASSAY OF URINE CREATININE: CPT

## 2017-03-12 PROCEDURE — 84540 ASSAY OF URINE/UREA-N: CPT

## 2017-03-12 PROCEDURE — 83735 ASSAY OF MAGNESIUM: CPT

## 2017-03-12 PROCEDURE — 85730 THROMBOPLASTIN TIME PARTIAL: CPT

## 2017-03-12 PROCEDURE — 88348 ELECTRON MICROSCOPY DX: CPT

## 2017-03-12 PROCEDURE — 86780 TREPONEMA PALLIDUM: CPT

## 2017-03-12 PROCEDURE — 87040 BLOOD CULTURE FOR BACTERIA: CPT

## 2017-03-12 PROCEDURE — 81001 URINALYSIS AUTO W/SCOPE: CPT

## 2017-03-12 PROCEDURE — 88313 SPECIAL STAINS GROUP 2: CPT

## 2017-03-12 PROCEDURE — 83605 ASSAY OF LACTIC ACID: CPT

## 2017-03-12 PROCEDURE — 96374 THER/PROPH/DIAG INJ IV PUSH: CPT

## 2017-03-12 PROCEDURE — 86334 IMMUNOFIX E-PHORESIS SERUM: CPT

## 2017-03-12 PROCEDURE — 80069 RENAL FUNCTION PANEL: CPT

## 2017-03-12 PROCEDURE — 86431 RHEUMATOID FACTOR QUANT: CPT

## 2017-03-12 PROCEDURE — 83036 HEMOGLOBIN GLYCOSYLATED A1C: CPT

## 2017-03-12 PROCEDURE — 76942 ECHO GUIDE FOR BIOPSY: CPT

## 2017-03-12 PROCEDURE — 86706 HEP B SURFACE ANTIBODY: CPT

## 2017-03-12 PROCEDURE — 84295 ASSAY OF SERUM SODIUM: CPT

## 2017-03-12 PROCEDURE — 88312 SPECIAL STAINS GROUP 1: CPT

## 2017-03-12 PROCEDURE — 50200 RENAL BIOPSY PERQ: CPT

## 2017-03-12 PROCEDURE — 84165 PROTEIN E-PHORESIS SERUM: CPT

## 2017-03-12 PROCEDURE — 87340 HEPATITIS B SURFACE AG IA: CPT

## 2017-03-12 PROCEDURE — 85014 HEMATOCRIT: CPT

## 2017-03-12 PROCEDURE — 88350 IMFLUOR EA ADDL 1ANTB STN PX: CPT

## 2017-03-12 PROCEDURE — 82435 ASSAY OF BLOOD CHLORIDE: CPT

## 2017-03-12 PROCEDURE — 85610 PROTHROMBIN TIME: CPT

## 2017-03-12 PROCEDURE — 86038 ANTINUCLEAR ANTIBODIES: CPT

## 2017-03-12 PROCEDURE — 86036 ANCA SCREEN EACH ANTIBODY: CPT

## 2017-03-12 PROCEDURE — 88346 IMFLUOR 1ST 1ANTB STAIN PX: CPT

## 2017-03-12 PROCEDURE — 93971 EXTREMITY STUDY: CPT

## 2017-03-12 PROCEDURE — 86235 NUCLEAR ANTIGEN ANTIBODY: CPT

## 2017-03-12 PROCEDURE — 86060 ANTISTREPTOLYSIN O TITER: CPT

## 2017-03-12 PROCEDURE — 82565 ASSAY OF CREATININE: CPT

## 2017-03-12 PROCEDURE — 84155 ASSAY OF PROTEIN SERUM: CPT

## 2017-03-12 PROCEDURE — 83521 IG LIGHT CHAINS FREE EACH: CPT

## 2017-03-12 PROCEDURE — 86803 HEPATITIS C AB TEST: CPT

## 2017-03-12 PROCEDURE — 86160 COMPLEMENT ANTIGEN: CPT

## 2017-03-12 PROCEDURE — 93975 VASCULAR STUDY: CPT

## 2017-03-12 PROCEDURE — 80053 COMPREHEN METABOLIC PANEL: CPT

## 2017-03-12 PROCEDURE — 83516 IMMUNOASSAY NONANTIBODY: CPT

## 2017-03-12 PROCEDURE — 82803 BLOOD GASES ANY COMBINATION: CPT

## 2017-03-12 PROCEDURE — 85027 COMPLETE CBC AUTOMATED: CPT

## 2017-03-12 PROCEDURE — 84300 ASSAY OF URINE SODIUM: CPT

## 2017-03-13 ENCOUNTER — OUTPATIENT (OUTPATIENT)
Dept: OUTPATIENT SERVICES | Facility: HOSPITAL | Age: 57
LOS: 1 days | Discharge: HOME | End: 2017-03-13

## 2017-03-13 ENCOUNTER — APPOINTMENT (OUTPATIENT)
Dept: HEMATOLOGY ONCOLOGY | Facility: CLINIC | Age: 57
End: 2017-03-13

## 2017-03-13 VITALS
TEMPERATURE: 98.6 F | DIASTOLIC BLOOD PRESSURE: 87 MMHG | WEIGHT: 121.25 LBS | RESPIRATION RATE: 16 BRPM | OXYGEN SATURATION: 10 % | BODY MASS INDEX: 22.89 KG/M2 | HEIGHT: 60.98 IN | SYSTOLIC BLOOD PRESSURE: 139 MMHG | HEART RATE: 90 BPM

## 2017-03-13 DIAGNOSIS — D75.1 SECONDARY POLYCYTHEMIA: ICD-10-CM

## 2017-03-13 LAB
BASOPHILS # BLD AUTO: 0.1 K/UL — SIGNIFICANT CHANGE UP (ref 0–0.2)
BASOPHILS NFR BLD AUTO: 0.4 % — SIGNIFICANT CHANGE UP (ref 0–2)
EOSINOPHIL # BLD AUTO: 0.5 K/UL — SIGNIFICANT CHANGE UP (ref 0–0.5)
EOSINOPHIL NFR BLD AUTO: 2.2 % — SIGNIFICANT CHANGE UP (ref 0–6)
HCT VFR BLD CALC: 49.5 % — HIGH (ref 34.5–45)
HGB BLD-MCNC: 15.3 G/DL — SIGNIFICANT CHANGE UP (ref 11.5–15.5)
LYMPHOCYTES # BLD AUTO: 2 K/UL — SIGNIFICANT CHANGE UP (ref 1–3.3)
LYMPHOCYTES # BLD AUTO: 8.6 % — LOW (ref 13–44)
MCHC RBC-ENTMCNC: 21.8 PG — LOW (ref 27–34)
MCHC RBC-ENTMCNC: 31 G/DL — LOW (ref 32–36)
MCV RBC AUTO: 70.3 FL — LOW (ref 80–100)
MONOCYTES # BLD AUTO: 0.7 K/UL — SIGNIFICANT CHANGE UP (ref 0–0.9)
MONOCYTES NFR BLD AUTO: 3.1 % — SIGNIFICANT CHANGE UP (ref 2–14)
NEUTROPHILS # BLD AUTO: 19.7 K/UL — HIGH (ref 1.8–7.4)
NEUTROPHILS NFR BLD AUTO: 85.6 % — HIGH (ref 43–77)
PLATELET # BLD AUTO: 562 K/UL — HIGH (ref 150–400)
RBC # BLD: 7.04 M/UL — HIGH (ref 3.8–5.2)
RBC # FLD: 20 % — HIGH (ref 10.3–14.5)
WBC # BLD: 23 K/UL — HIGH (ref 3.8–10.5)
WBC # FLD AUTO: 23 K/UL — HIGH (ref 3.8–10.5)

## 2017-03-14 ENCOUNTER — RESULT REVIEW (OUTPATIENT)
Age: 57
End: 2017-03-14

## 2017-03-14 LAB
ALBUMIN SERPL ELPH-MCNC: 3.9 G/DL — SIGNIFICANT CHANGE UP (ref 3.3–5)
ALP SERPL-CCNC: 245 U/L — HIGH (ref 40–120)
ALT FLD-CCNC: 8 U/L — LOW (ref 10–45)
ANION GAP SERPL CALC-SCNC: 29 MMOL/L — HIGH (ref 5–17)
AST SERPL-CCNC: 19 U/L — SIGNIFICANT CHANGE UP (ref 10–40)
BILIRUB SERPL-MCNC: 1.7 MG/DL — HIGH (ref 0.2–1.2)
BUN SERPL-MCNC: 35 MG/DL — HIGH (ref 7–23)
CALCIUM SERPL-MCNC: 8.9 MG/DL — SIGNIFICANT CHANGE UP (ref 8.4–10.5)
CHLORIDE SERPL-SCNC: 98 MMOL/L — SIGNIFICANT CHANGE UP (ref 96–108)
CO2 SERPL-SCNC: 13 MMOL/L — LOW (ref 22–31)
CREAT SERPL-MCNC: 3.75 MG/DL — HIGH (ref 0.5–1.3)
GLUCOSE SERPL-MCNC: SIGNIFICANT CHANGE UP (ref 70–99)
POTASSIUM SERPL-MCNC: 6.4 MMOL/L — CRITICAL HIGH (ref 3.5–5.3)
POTASSIUM SERPL-SCNC: 6.4 MMOL/L — CRITICAL HIGH (ref 3.5–5.3)
PROT SERPL-MCNC: 7.7 G/DL — SIGNIFICANT CHANGE UP (ref 6–8.3)
SODIUM SERPL-SCNC: 140 MMOL/L — SIGNIFICANT CHANGE UP (ref 135–145)

## 2017-03-15 ENCOUNTER — OUTPATIENT (OUTPATIENT)
Dept: OUTPATIENT SERVICES | Facility: HOSPITAL | Age: 57
LOS: 1 days | End: 2017-03-15
Payer: MEDICAID

## 2017-03-15 DIAGNOSIS — D75.1 SECONDARY POLYCYTHEMIA: ICD-10-CM

## 2017-03-15 LAB — EPO SERPL-MCNC: 7.1 MIU/ML — SIGNIFICANT CHANGE UP (ref 2.6–18.5)

## 2017-03-15 PROCEDURE — 81270 JAK2 GENE: CPT

## 2017-03-15 PROCEDURE — G0452: CPT | Mod: 26

## 2017-03-16 ENCOUNTER — EMERGENCY (EMERGENCY)
Facility: HOSPITAL | Age: 57
LOS: 1 days | Discharge: HOME | End: 2017-03-16
Attending: EMERGENCY MEDICINE | Admitting: EMERGENCY MEDICINE
Payer: MEDICAID

## 2017-03-16 ENCOUNTER — OUTPATIENT (OUTPATIENT)
Dept: OUTPATIENT SERVICES | Facility: HOSPITAL | Age: 57
LOS: 1 days | End: 2017-03-16
Payer: SELF-PAY

## 2017-03-16 ENCOUNTER — APPOINTMENT (OUTPATIENT)
Dept: INTERNAL MEDICINE | Facility: CLINIC | Age: 57
End: 2017-03-16

## 2017-03-16 VITALS
TEMPERATURE: 98 F | HEIGHT: 62 IN | RESPIRATION RATE: 19 BRPM | DIASTOLIC BLOOD PRESSURE: 88 MMHG | HEART RATE: 75 BPM | WEIGHT: 121.92 LBS | SYSTOLIC BLOOD PRESSURE: 146 MMHG | OXYGEN SATURATION: 98 %

## 2017-03-16 DIAGNOSIS — I10 ESSENTIAL (PRIMARY) HYPERTENSION: ICD-10-CM

## 2017-03-16 DIAGNOSIS — M79.605 PAIN IN LEFT LEG: ICD-10-CM

## 2017-03-16 LAB
ALBUMIN SERPL ELPH-MCNC: 3.6 G/DL — SIGNIFICANT CHANGE UP (ref 3.3–5)
ALP SERPL-CCNC: 177 U/L — HIGH (ref 40–120)
ALT FLD-CCNC: 8 U/L RC — LOW (ref 10–45)
ANION GAP SERPL CALC-SCNC: 10 MMOL/L — SIGNIFICANT CHANGE UP (ref 5–17)
ANION GAP SERPL CALC-SCNC: 17 MMOL/L — SIGNIFICANT CHANGE UP (ref 5–17)
AST SERPL-CCNC: 14 U/L — SIGNIFICANT CHANGE UP (ref 10–40)
BASOPHILS # BLD AUTO: 0.1 K/UL — SIGNIFICANT CHANGE UP (ref 0–0.2)
BASOPHILS NFR BLD AUTO: 0.5 % — SIGNIFICANT CHANGE UP (ref 0–2)
BILIRUB SERPL-MCNC: 1.4 MG/DL — HIGH (ref 0.2–1.2)
BUN SERPL-MCNC: 40 MG/DL — HIGH (ref 7–23)
BUN SERPL-MCNC: 40 MG/DL — HIGH (ref 7–23)
CALCIUM SERPL-MCNC: 8.3 MG/DL — LOW (ref 8.4–10.5)
CALCIUM SERPL-MCNC: 8.4 MG/DL — SIGNIFICANT CHANGE UP (ref 8.4–10.5)
CHLORIDE SERPL-SCNC: 105 MMOL/L — SIGNIFICANT CHANGE UP (ref 96–108)
CHLORIDE SERPL-SCNC: 106 MMOL/L — SIGNIFICANT CHANGE UP (ref 96–108)
CO2 SERPL-SCNC: 17 MMOL/L — LOW (ref 22–31)
CO2 SERPL-SCNC: 24 MMOL/L — SIGNIFICANT CHANGE UP (ref 22–31)
CREAT SERPL-MCNC: 3.5 MG/DL — HIGH (ref 0.5–1.3)
CREAT SERPL-MCNC: 3.64 MG/DL — HIGH (ref 0.5–1.3)
EOSINOPHIL # BLD AUTO: 0.5 K/UL — SIGNIFICANT CHANGE UP (ref 0–0.5)
EOSINOPHIL NFR BLD AUTO: 2.4 % — SIGNIFICANT CHANGE UP (ref 0–6)
GAS PNL BLDV: SIGNIFICANT CHANGE UP
GLUCOSE SERPL-MCNC: 110 MG/DL — HIGH (ref 70–99)
GLUCOSE SERPL-MCNC: 138 MG/DL — HIGH (ref 70–99)
HCT VFR BLD CALC: 46.1 % — HIGH (ref 34.5–45)
HGB BLD-MCNC: 14.8 G/DL — SIGNIFICANT CHANGE UP (ref 11.5–15.5)
LYMPHOCYTES # BLD AUTO: 1.9 K/UL — SIGNIFICANT CHANGE UP (ref 1–3.3)
LYMPHOCYTES # BLD AUTO: 9.8 % — LOW (ref 13–44)
MCHC RBC-ENTMCNC: 22.7 PG — LOW (ref 27–34)
MCHC RBC-ENTMCNC: 32.1 GM/DL — SIGNIFICANT CHANGE UP (ref 32–36)
MCV RBC AUTO: 70.7 FL — LOW (ref 80–100)
MONOCYTES # BLD AUTO: 0.6 K/UL — SIGNIFICANT CHANGE UP (ref 0–0.9)
MONOCYTES NFR BLD AUTO: 3.3 % — SIGNIFICANT CHANGE UP (ref 2–14)
NEUTROPHILS # BLD AUTO: 15.9 K/UL — HIGH (ref 1.8–7.4)
NEUTROPHILS NFR BLD AUTO: 83.9 % — HIGH (ref 43–77)
PLATELET # BLD AUTO: 472 K/UL — HIGH (ref 150–400)
POTASSIUM SERPL-MCNC: 4.9 MMOL/L — SIGNIFICANT CHANGE UP (ref 3.5–5.3)
POTASSIUM SERPL-MCNC: 5.4 MMOL/L — HIGH (ref 3.5–5.3)
POTASSIUM SERPL-SCNC: 4.9 MMOL/L — SIGNIFICANT CHANGE UP (ref 3.5–5.3)
POTASSIUM SERPL-SCNC: 5.4 MMOL/L — HIGH (ref 3.5–5.3)
PROT SERPL-MCNC: 7.1 G/DL — SIGNIFICANT CHANGE UP (ref 6–8.3)
RBC # BLD: 6.52 M/UL — HIGH (ref 3.8–5.2)
RBC # FLD: 19.5 % — HIGH (ref 10.3–14.5)
SODIUM SERPL-SCNC: 139 MMOL/L — SIGNIFICANT CHANGE UP (ref 135–145)
SODIUM SERPL-SCNC: 140 MMOL/L — SIGNIFICANT CHANGE UP (ref 135–145)
WBC # BLD: 18.9 K/UL — HIGH (ref 3.8–10.5)
WBC # FLD AUTO: 18.9 K/UL — HIGH (ref 3.8–10.5)

## 2017-03-16 PROCEDURE — 82550 ASSAY OF CK (CPK): CPT

## 2017-03-16 PROCEDURE — 99284 EMERGENCY DEPT VISIT MOD MDM: CPT | Mod: 25

## 2017-03-16 PROCEDURE — G0463: CPT

## 2017-03-16 PROCEDURE — 93971 EXTREMITY STUDY: CPT

## 2017-03-16 PROCEDURE — 82803 BLOOD GASES ANY COMBINATION: CPT

## 2017-03-16 PROCEDURE — 80053 COMPREHEN METABOLIC PANEL: CPT

## 2017-03-16 PROCEDURE — 93971 EXTREMITY STUDY: CPT | Mod: 26

## 2017-03-16 PROCEDURE — 84132 ASSAY OF SERUM POTASSIUM: CPT

## 2017-03-16 PROCEDURE — 84295 ASSAY OF SERUM SODIUM: CPT

## 2017-03-16 PROCEDURE — 82435 ASSAY OF BLOOD CHLORIDE: CPT

## 2017-03-16 PROCEDURE — 82947 ASSAY GLUCOSE BLOOD QUANT: CPT

## 2017-03-16 PROCEDURE — 80048 BASIC METABOLIC PNL TOTAL CA: CPT

## 2017-03-16 PROCEDURE — 85027 COMPLETE CBC AUTOMATED: CPT

## 2017-03-16 PROCEDURE — 83605 ASSAY OF LACTIC ACID: CPT

## 2017-03-16 PROCEDURE — 82330 ASSAY OF CALCIUM: CPT

## 2017-03-16 PROCEDURE — 85014 HEMATOCRIT: CPT

## 2017-03-16 PROCEDURE — 99285 EMERGENCY DEPT VISIT HI MDM: CPT | Mod: 25

## 2017-03-16 PROCEDURE — 93010 ELECTROCARDIOGRAM REPORT: CPT

## 2017-03-16 RX ORDER — SODIUM CHLORIDE 9 MG/ML
500 INJECTION, SOLUTION INTRAVENOUS ONCE
Qty: 0 | Refills: 0 | Status: COMPLETED | OUTPATIENT
Start: 2017-03-16 | End: 2017-03-16

## 2017-03-16 RX ADMIN — SODIUM CHLORIDE 500 MILLILITER(S): 9 INJECTION, SOLUTION INTRAVENOUS at 19:53

## 2017-03-16 NOTE — ED PROVIDER NOTE - PLAN OF CARE
1) Take your current medications as prescribed.  2) Follow up with your hematologist tomorrow for further evaluation and to answer any questions you have.    3) Return to the emergency department if you experience worsening symptoms, pain, fever, chills, nausea, vomiting or other concerning symptoms.

## 2017-03-16 NOTE — ED ADULT NURSE NOTE - OBJECTIVE STATEMENT
56 yr old female coming into ED; states she was recently seen at a clinic for lower extremity edema; patient also dx with kidney failure (family does not know what stage); daughter states "the doctors said they want to try to treat her potassium before starting dialysis". Pt was told by clinic that her potassium was elevated. Swelling noted in lower extremity near ankles; no redness. Daughter states she has had cellulitis in the past. Pt able to ambulate. No chest pain, sob, n/v/d, fevers, chills.

## 2017-03-16 NOTE — ED ADULT NURSE REASSESSMENT NOTE - NS ED NURSE REASSESS COMMENT FT1
Patient is Aaox4 denies distress, Denies any SOB, Chest pain, tingling or numbness. Patient updated on plan of care. Awaiting Disposition

## 2017-03-16 NOTE — ED PROVIDER NOTE - OBJECTIVE STATEMENT
56F with pmh ckd not on dialysis, polycythemia vera t/w plasmapharesis presents from urgent care with hyperkalemia 6.4.  Pt was seen at  for concern for cellulitis of LLE (has h/o cellulitis of RLE) and swelling. 56F with pmh ckd not on dialysis, polycythemia vera t/w plasmapheresis presents from urgent care with hyperkalemia 6.4.  Pt was seen at  earlier today for concern for cellulitis of LLE (has h/o cellulitis of RLE) and swelling. 56F with pmh ckd not on dialysis, polycythemia vera t/w plasmapheresis presents from urgent care with hyperkalemia 6.4.  Pt was seen at  earlier today for concern for cellulitis of LLE (has h/o cellulitis of RLE).  Patient complaining of pain and swelling of anterior LLE.  Denies chest pain, shortness of breath, fever, chills, abdominal pain, h/o VTE, palpitations, muscle pain, confusion.

## 2017-03-16 NOTE — ED PROVIDER NOTE - CARE PLAN
Principal Discharge DX:	Hyperkalemia Principal Discharge DX:	Hyperkalemia  Instructions for follow-up, activity and diet:	1) Take your current medications as prescribed.  2) Follow up with your hematologist tomorrow for further evaluation and to answer any questions you have.    3) Return to the emergency department if you experience worsening symptoms, pain, fever, chills, nausea, vomiting or other concerning symptoms.

## 2017-03-16 NOTE — ED PROVIDER NOTE - PROGRESS NOTE DETAILS
ATTD: Dr. Ras Lu I spoke with heme onc Dr. Norris and will repeat bmp for K. Noted elevated wbc. this is on previous labs over the years and not new. as discussed with heme onc not likely infectious cause given the differential. also made arrangements thru heme onc for close follow up with nephrology.

## 2017-03-17 VITALS
DIASTOLIC BLOOD PRESSURE: 70 MMHG | OXYGEN SATURATION: 98 % | RESPIRATION RATE: 18 BRPM | TEMPERATURE: 98 F | SYSTOLIC BLOOD PRESSURE: 128 MMHG | HEART RATE: 88 BPM

## 2017-03-17 DIAGNOSIS — R60.0 LOCALIZED EDEMA: ICD-10-CM

## 2017-03-17 DIAGNOSIS — E87.5 HYPERKALEMIA: ICD-10-CM

## 2017-03-17 DIAGNOSIS — R94.31 ABNORMAL ELECTROCARDIOGRAM [ECG] [EKG]: ICD-10-CM

## 2017-03-21 DIAGNOSIS — N18.5 CHRONIC KIDNEY DISEASE, STAGE 5: ICD-10-CM

## 2017-03-21 DIAGNOSIS — I10 ESSENTIAL (PRIMARY) HYPERTENSION: ICD-10-CM

## 2017-03-21 DIAGNOSIS — E87.5 HYPERKALEMIA: ICD-10-CM

## 2017-03-23 LAB
ALBUMIN SERPL ELPH-MCNC: 3.6 G/DL
ANION GAP SERPL CALC-SCNC: 16 MMOL/L
BUN SERPL-MCNC: 43 MG/DL
CALCIUM SERPL-MCNC: 8.6 MG/DL
CHLORIDE SERPL-SCNC: 105 MMOL/L
CO2 SERPL-SCNC: 20 MMOL/L
CREAT SERPL-MCNC: 3.47 MG/DL
GLUCOSE SERPL-MCNC: 90 MG/DL
PHOSPHATE SERPL-MCNC: 5.4 MG/DL
POTASSIUM SERPL-SCNC: 6.1 MMOL/L
SODIUM SERPL-SCNC: 141 MMOL/L

## 2017-03-27 ENCOUNTER — OUTPATIENT (OUTPATIENT)
Dept: OUTPATIENT SERVICES | Facility: HOSPITAL | Age: 57
LOS: 1 days | End: 2017-03-27
Payer: SELF-PAY

## 2017-03-27 ENCOUNTER — LABORATORY RESULT (OUTPATIENT)
Age: 57
End: 2017-03-27

## 2017-03-27 ENCOUNTER — APPOINTMENT (OUTPATIENT)
Dept: INTERNAL MEDICINE | Facility: CLINIC | Age: 57
End: 2017-03-27

## 2017-03-27 VITALS
SYSTOLIC BLOOD PRESSURE: 140 MMHG | HEART RATE: 80 BPM | WEIGHT: 122 LBS | HEIGHT: 60.98 IN | DIASTOLIC BLOOD PRESSURE: 80 MMHG | OXYGEN SATURATION: 97 % | BODY MASS INDEX: 23.03 KG/M2

## 2017-03-27 DIAGNOSIS — I10 ESSENTIAL (PRIMARY) HYPERTENSION: ICD-10-CM

## 2017-03-28 DIAGNOSIS — N18.5 CHRONIC KIDNEY DISEASE, STAGE 5: ICD-10-CM

## 2017-03-28 LAB
ANION GAP SERPL CALC-SCNC: 16 MMOL/L — SIGNIFICANT CHANGE UP (ref 5–17)
BUN SERPL-MCNC: 35 MG/DL — HIGH (ref 7–23)
CALCIUM SERPL-MCNC: 8.8 MG/DL — SIGNIFICANT CHANGE UP (ref 8.4–10.5)
CHLORIDE SERPL-SCNC: 104 MMOL/L — SIGNIFICANT CHANGE UP (ref 96–108)
CO2 SERPL-SCNC: 21 MMOL/L — LOW (ref 22–31)
CREAT SERPL-MCNC: 3.31 MG/DL — HIGH (ref 0.5–1.3)
GLUCOSE SERPL-MCNC: 84 MG/DL — SIGNIFICANT CHANGE UP (ref 70–99)
MAGNESIUM SERPL-MCNC: 2.1 MG/DL — SIGNIFICANT CHANGE UP (ref 1.6–2.6)
PHOSPHATE SERPL-MCNC: 5 MG/DL — HIGH (ref 2.5–4.5)
POTASSIUM SERPL-MCNC: 5.4 MMOL/L — HIGH (ref 3.5–5.3)
POTASSIUM SERPL-SCNC: 5.4 MMOL/L — HIGH (ref 3.5–5.3)
SODIUM SERPL-SCNC: 141 MMOL/L — SIGNIFICANT CHANGE UP (ref 135–145)

## 2017-04-03 ENCOUNTER — LABORATORY RESULT (OUTPATIENT)
Age: 57
End: 2017-04-03

## 2017-04-03 PROCEDURE — 80048 BASIC METABOLIC PNL TOTAL CA: CPT

## 2017-04-03 PROCEDURE — G0463: CPT

## 2017-04-03 PROCEDURE — 83735 ASSAY OF MAGNESIUM: CPT

## 2017-04-03 PROCEDURE — 84100 ASSAY OF PHOSPHORUS: CPT

## 2017-04-04 LAB
ANION GAP SERPL CALC-SCNC: 18 MMOL/L — HIGH (ref 5–17)
BUN SERPL-MCNC: 39 MG/DL — HIGH (ref 7–23)
CALCIUM SERPL-MCNC: 8.7 MG/DL — SIGNIFICANT CHANGE UP (ref 8.4–10.5)
CHLORIDE SERPL-SCNC: 102 MMOL/L — SIGNIFICANT CHANGE UP (ref 96–108)
CO2 SERPL-SCNC: 19 MMOL/L — LOW (ref 22–31)
CREAT SERPL-MCNC: 3.51 MG/DL — HIGH (ref 0.5–1.3)
GLUCOSE SERPL-MCNC: 90 MG/DL — SIGNIFICANT CHANGE UP (ref 70–99)
MAGNESIUM SERPL-MCNC: 2 MG/DL — SIGNIFICANT CHANGE UP (ref 1.6–2.6)
PHOSPHATE SERPL-MCNC: 5.3 MG/DL — HIGH (ref 2.5–4.5)
POTASSIUM SERPL-MCNC: 5.5 MMOL/L — HIGH (ref 3.5–5.3)
POTASSIUM SERPL-SCNC: 5.5 MMOL/L — HIGH (ref 3.5–5.3)
SODIUM SERPL-SCNC: 139 MMOL/L — SIGNIFICANT CHANGE UP (ref 135–145)

## 2017-04-13 ENCOUNTER — OUTPATIENT (OUTPATIENT)
Dept: OUTPATIENT SERVICES | Facility: HOSPITAL | Age: 57
LOS: 1 days | End: 2017-04-13
Payer: SELF-PAY

## 2017-04-13 ENCOUNTER — APPOINTMENT (OUTPATIENT)
Dept: INTERNAL MEDICINE | Facility: CLINIC | Age: 57
End: 2017-04-13

## 2017-04-13 VITALS
BODY MASS INDEX: 22.66 KG/M2 | OXYGEN SATURATION: 97 % | WEIGHT: 120 LBS | HEART RATE: 76 BPM | SYSTOLIC BLOOD PRESSURE: 128 MMHG | HEIGHT: 60.98 IN | DIASTOLIC BLOOD PRESSURE: 78 MMHG

## 2017-04-13 DIAGNOSIS — I10 ESSENTIAL (PRIMARY) HYPERTENSION: ICD-10-CM

## 2017-04-13 DIAGNOSIS — N18.5 CHRONIC KIDNEY DISEASE, STAGE 5: ICD-10-CM

## 2017-04-13 PROCEDURE — G0463: CPT

## 2017-04-18 ENCOUNTER — OUTPATIENT (OUTPATIENT)
Dept: OUTPATIENT SERVICES | Facility: HOSPITAL | Age: 57
LOS: 1 days | Discharge: HOME | End: 2017-04-18

## 2017-04-18 DIAGNOSIS — D75.1 SECONDARY POLYCYTHEMIA: ICD-10-CM

## 2017-04-20 ENCOUNTER — MESSAGE (OUTPATIENT)
Age: 57
End: 2017-04-20

## 2017-04-20 ENCOUNTER — APPOINTMENT (OUTPATIENT)
Dept: HEMATOLOGY ONCOLOGY | Facility: CLINIC | Age: 57
End: 2017-04-20

## 2017-04-20 DIAGNOSIS — R07.89 OTHER CHEST PAIN: ICD-10-CM

## 2017-04-20 DIAGNOSIS — E78.5 HYPERLIPIDEMIA, UNSPECIFIED: ICD-10-CM

## 2017-04-26 ENCOUNTER — LABORATORY RESULT (OUTPATIENT)
Age: 57
End: 2017-04-26

## 2017-04-26 ENCOUNTER — APPOINTMENT (OUTPATIENT)
Dept: NEPHROLOGY | Facility: CLINIC | Age: 57
End: 2017-04-26

## 2017-04-26 ENCOUNTER — OUTPATIENT (OUTPATIENT)
Dept: OUTPATIENT SERVICES | Facility: HOSPITAL | Age: 57
LOS: 1 days | End: 2017-04-26
Payer: SELF-PAY

## 2017-04-26 VITALS
DIASTOLIC BLOOD PRESSURE: 97 MMHG | WEIGHT: 119 LBS | HEART RATE: 82 BPM | HEIGHT: 60 IN | BODY MASS INDEX: 23.36 KG/M2 | SYSTOLIC BLOOD PRESSURE: 169 MMHG | OXYGEN SATURATION: 98 %

## 2017-04-26 VITALS — SYSTOLIC BLOOD PRESSURE: 130 MMHG | DIASTOLIC BLOOD PRESSURE: 80 MMHG

## 2017-04-26 PROCEDURE — G0463: CPT

## 2017-04-27 LAB
25(OH)D3 SERPL-MCNC: 34.2 NG/ML
ALBUMIN SERPL ELPH-MCNC: 3.8 G/DL
ANION GAP SERPL CALC-SCNC: 16 MMOL/L
BASOPHILS # BLD AUTO: 0.36 K/UL
BASOPHILS NFR BLD AUTO: 3.6 %
BUN SERPL-MCNC: 40 MG/DL
CALCIUM SERPL-MCNC: 8.3 MG/DL
CHLORIDE SERPL-SCNC: 103 MMOL/L
CO2 SERPL-SCNC: 20 MMOL/L
CREAT SERPL-MCNC: 3.71 MG/DL
EOSINOPHIL # BLD AUTO: 0.09 K/UL
EOSINOPHIL NFR BLD AUTO: 0.9 %
FERRITIN SERPL-MCNC: 12.7 NG/ML
GLUCOSE SERPL-MCNC: 120 MG/DL
HCT VFR BLD CALC: 46.5 %
HGB BLD-MCNC: 14.3 G/DL
IRON SATN MFR SERPL: 8 %
IRON SERPL-MCNC: 28 UG/DL
LYMPHOCYTES # BLD AUTO: 1.89 K/UL
LYMPHOCYTES NFR BLD AUTO: 19.1 %
MAN DIFF?: NORMAL
MCHC RBC-ENTMCNC: 21.9 PG
MCHC RBC-ENTMCNC: 30.8 GM/DL
MCV RBC AUTO: 71.2 FL
MONOCYTES # BLD AUTO: 0.36 K/UL
MONOCYTES NFR BLD AUTO: 3.6 %
NEUTROPHILS # BLD AUTO: 7.1 K/UL
NEUTROPHILS NFR BLD AUTO: 71.9 %
PHOSPHATE SERPL-MCNC: 5.7 MG/DL
PLATELET # BLD AUTO: 388 K/UL
POTASSIUM SERPL-SCNC: 5.1 MMOL/L
RBC # BLD: 6.53 M/UL
RBC # FLD: 19.8 %
SODIUM SERPL-SCNC: 139 MMOL/L
TIBC SERPL-MCNC: 333 UG/DL
UIBC SERPL-MCNC: 305 UG/DL
WBC # FLD AUTO: 9.87 K/UL

## 2017-04-28 DIAGNOSIS — E87.5 HYPERKALEMIA: ICD-10-CM

## 2017-04-28 DIAGNOSIS — I10 ESSENTIAL (PRIMARY) HYPERTENSION: ICD-10-CM

## 2017-04-28 DIAGNOSIS — N18.5 CHRONIC KIDNEY DISEASE, STAGE 5: ICD-10-CM

## 2017-05-18 ENCOUNTER — APPOINTMENT (OUTPATIENT)
Dept: INTERNAL MEDICINE | Facility: CLINIC | Age: 57
End: 2017-05-18

## 2017-05-18 ENCOUNTER — LABORATORY RESULT (OUTPATIENT)
Age: 57
End: 2017-05-18

## 2017-05-18 ENCOUNTER — OUTPATIENT (OUTPATIENT)
Dept: OUTPATIENT SERVICES | Facility: HOSPITAL | Age: 57
LOS: 1 days | End: 2017-05-18
Payer: SELF-PAY

## 2017-05-18 VITALS
OXYGEN SATURATION: 98 % | SYSTOLIC BLOOD PRESSURE: 120 MMHG | WEIGHT: 117 LBS | DIASTOLIC BLOOD PRESSURE: 80 MMHG | BODY MASS INDEX: 22.85 KG/M2 | HEART RATE: 76 BPM

## 2017-05-18 DIAGNOSIS — N18.4 CHRONIC KIDNEY DISEASE, STAGE 4 (SEVERE): ICD-10-CM

## 2017-05-18 DIAGNOSIS — I10 ESSENTIAL (PRIMARY) HYPERTENSION: ICD-10-CM

## 2017-05-18 PROCEDURE — G0463: CPT

## 2017-05-19 LAB
ANION GAP SERPL CALC-SCNC: 21 MMOL/L
BASOPHILS # BLD AUTO: 0.08 K/UL
BASOPHILS NFR BLD AUTO: 0.9 %
BUN SERPL-MCNC: 52 MG/DL
CALCIUM SERPL-MCNC: 8.1 MG/DL
CHLORIDE SERPL-SCNC: 103 MMOL/L
CO2 SERPL-SCNC: 16 MMOL/L
CREAT SERPL-MCNC: 4.29 MG/DL
EOSINOPHIL # BLD AUTO: 0.52 K/UL
EOSINOPHIL NFR BLD AUTO: 6.1 %
GLUCOSE SERPL-MCNC: 97 MG/DL
HCT VFR BLD CALC: 45.3 %
HGB BLD-MCNC: 13.9 G/DL
LYMPHOCYTES # BLD AUTO: 0.97 K/UL
LYMPHOCYTES NFR BLD AUTO: 11.3 %
MAN DIFF?: NORMAL
MCHC RBC-ENTMCNC: 22.5 PG
MCHC RBC-ENTMCNC: 30.7 GM/DL
MCV RBC AUTO: 73.4 FL
MONOCYTES # BLD AUTO: 0.45 K/UL
MONOCYTES NFR BLD AUTO: 5.2 %
NEUTROPHILS # BLD AUTO: 6.36 K/UL
NEUTROPHILS NFR BLD AUTO: 66.1 %
PLATELET # BLD AUTO: 205 K/UL
POTASSIUM SERPL-SCNC: 4.8 MMOL/L
RBC # BLD: 6.17 M/UL
RBC # FLD: 24 %
SODIUM SERPL-SCNC: 140 MMOL/L
WBC # FLD AUTO: 8.6 K/UL

## 2017-05-24 ENCOUNTER — OUTPATIENT (OUTPATIENT)
Dept: OUTPATIENT SERVICES | Facility: HOSPITAL | Age: 57
LOS: 1 days | Discharge: HOME | End: 2017-05-24

## 2017-05-24 DIAGNOSIS — D75.1 SECONDARY POLYCYTHEMIA: ICD-10-CM

## 2017-05-25 ENCOUNTER — APPOINTMENT (OUTPATIENT)
Dept: HEMATOLOGY ONCOLOGY | Facility: CLINIC | Age: 57
End: 2017-05-25

## 2017-05-25 ENCOUNTER — RESULT REVIEW (OUTPATIENT)
Age: 57
End: 2017-05-25

## 2017-05-25 VITALS
WEIGHT: 119.05 LBS | OXYGEN SATURATION: 98 % | SYSTOLIC BLOOD PRESSURE: 120 MMHG | TEMPERATURE: 98.1 F | DIASTOLIC BLOOD PRESSURE: 70 MMHG | HEART RATE: 73 BPM | BODY MASS INDEX: 23.25 KG/M2 | RESPIRATION RATE: 14 BRPM

## 2017-05-25 LAB
ALBUMIN SERPL ELPH-MCNC: 3.9 G/DL — SIGNIFICANT CHANGE UP (ref 3.3–5)
ALP SERPL-CCNC: 208 U/L — HIGH (ref 40–120)
ALT FLD-CCNC: 8 U/L — LOW (ref 10–45)
ANION GAP SERPL CALC-SCNC: 15 MMOL/L — SIGNIFICANT CHANGE UP (ref 5–17)
AST SERPL-CCNC: 12 U/L — SIGNIFICANT CHANGE UP (ref 10–40)
BILIRUB SERPL-MCNC: 1.1 MG/DL — SIGNIFICANT CHANGE UP (ref 0.2–1.2)
BUN SERPL-MCNC: 50 MG/DL — HIGH (ref 7–23)
CALCIUM SERPL-MCNC: 9 MG/DL — SIGNIFICANT CHANGE UP (ref 8.4–10.5)
CHLORIDE SERPL-SCNC: 107 MMOL/L — SIGNIFICANT CHANGE UP (ref 96–108)
CO2 SERPL-SCNC: 17 MMOL/L — LOW (ref 22–31)
CREAT SERPL-MCNC: 3.94 MG/DL — HIGH (ref 0.5–1.3)
GLUCOSE SERPL-MCNC: 92 MG/DL — SIGNIFICANT CHANGE UP (ref 70–99)
HCT VFR BLD CALC: 44 % — SIGNIFICANT CHANGE UP (ref 34.5–45)
HGB BLD-MCNC: 14.5 G/DL — SIGNIFICANT CHANGE UP (ref 11.5–15.5)
MCHC RBC-ENTMCNC: 24.5 PG — LOW (ref 27–34)
MCHC RBC-ENTMCNC: 32.9 G/DL — SIGNIFICANT CHANGE UP (ref 32–36)
MCV RBC AUTO: 74.6 FL — LOW (ref 80–100)
PLATELET # BLD AUTO: 181 K/UL — SIGNIFICANT CHANGE UP (ref 150–400)
POTASSIUM SERPL-MCNC: 5.8 MMOL/L — HIGH (ref 3.5–5.3)
POTASSIUM SERPL-SCNC: 5.8 MMOL/L — HIGH (ref 3.5–5.3)
PROT SERPL-MCNC: 7.2 G/DL — SIGNIFICANT CHANGE UP (ref 6–8.3)
RBC # BLD: 5.9 M/UL — HIGH (ref 3.8–5.2)
RBC # FLD: 21.8 % — HIGH (ref 10.3–14.5)
SODIUM SERPL-SCNC: 139 MMOL/L — SIGNIFICANT CHANGE UP (ref 135–145)
WBC # BLD: 10.2 K/UL — SIGNIFICANT CHANGE UP (ref 3.8–10.5)
WBC # FLD AUTO: 10.2 K/UL — SIGNIFICANT CHANGE UP (ref 3.8–10.5)

## 2017-06-02 DIAGNOSIS — N18.5 CHRONIC KIDNEY DISEASE, STAGE 5: ICD-10-CM

## 2017-06-02 DIAGNOSIS — D45 POLYCYTHEMIA VERA: ICD-10-CM

## 2017-06-18 ENCOUNTER — INPATIENT (INPATIENT)
Facility: HOSPITAL | Age: 57
LOS: 2 days | Discharge: HOME | DRG: 603 | End: 2017-06-21
Attending: HOSPITALIST | Admitting: HOSPITALIST
Payer: MEDICAID

## 2017-06-18 VITALS
RESPIRATION RATE: 18 BRPM | DIASTOLIC BLOOD PRESSURE: 89 MMHG | HEART RATE: 77 BPM | OXYGEN SATURATION: 98 % | SYSTOLIC BLOOD PRESSURE: 157 MMHG | TEMPERATURE: 98 F

## 2017-06-18 PROCEDURE — 99285 EMERGENCY DEPT VISIT HI MDM: CPT | Mod: 25

## 2017-06-18 PROCEDURE — 93010 ELECTROCARDIOGRAM REPORT: CPT

## 2017-06-18 NOTE — ED ADULT NURSE NOTE - OBJECTIVE STATEMENT
56y female pt, hx of CKD arrived to ED c/o right foot swelling with redness since Thursday. Hx of cellulitis on RLE, pt states that pain and discomfort is similar to previous episode. No fever/chills, no chest pain or sob, abd nondistended, soft, denies any urinary deficits.

## 2017-06-19 DIAGNOSIS — I10 ESSENTIAL (PRIMARY) HYPERTENSION: ICD-10-CM

## 2017-06-19 DIAGNOSIS — L03.90 CELLULITIS, UNSPECIFIED: ICD-10-CM

## 2017-06-19 DIAGNOSIS — Z29.9 ENCOUNTER FOR PROPHYLACTIC MEASURES, UNSPECIFIED: ICD-10-CM

## 2017-06-19 DIAGNOSIS — M79.671 PAIN IN RIGHT FOOT: ICD-10-CM

## 2017-06-19 DIAGNOSIS — D45 POLYCYTHEMIA VERA: ICD-10-CM

## 2017-06-19 DIAGNOSIS — N18.5 CHRONIC KIDNEY DISEASE, STAGE 5: ICD-10-CM

## 2017-06-19 LAB
ALBUMIN SERPL ELPH-MCNC: 3.9 G/DL — SIGNIFICANT CHANGE UP (ref 3.3–5)
ALP SERPL-CCNC: 209 U/L — HIGH (ref 40–120)
ALT FLD-CCNC: 14 U/L RC — SIGNIFICANT CHANGE UP (ref 10–45)
ANION GAP SERPL CALC-SCNC: 16 MMOL/L — SIGNIFICANT CHANGE UP (ref 5–17)
ANION GAP SERPL CALC-SCNC: 19 MMOL/L — HIGH (ref 5–17)
APPEARANCE UR: CLEAR — SIGNIFICANT CHANGE UP
APTT BLD: 42.7 SEC — HIGH (ref 27.5–37.4)
AST SERPL-CCNC: 38 U/L — SIGNIFICANT CHANGE UP (ref 10–40)
BACTERIA # UR AUTO: ABNORMAL /HPF
BASOPHILS # BLD AUTO: 0 K/UL — SIGNIFICANT CHANGE UP (ref 0–0.2)
BASOPHILS NFR BLD AUTO: 0.3 % — SIGNIFICANT CHANGE UP (ref 0–2)
BILIRUB SERPL-MCNC: 0.8 MG/DL — SIGNIFICANT CHANGE UP (ref 0.2–1.2)
BILIRUB UR-MCNC: NEGATIVE — SIGNIFICANT CHANGE UP
BUN SERPL-MCNC: 65 MG/DL — HIGH (ref 7–23)
BUN SERPL-MCNC: 67 MG/DL — HIGH (ref 7–23)
CALCIUM SERPL-MCNC: 7.4 MG/DL — LOW (ref 8.4–10.5)
CALCIUM SERPL-MCNC: 8.1 MG/DL — LOW (ref 8.4–10.5)
CHLORIDE SERPL-SCNC: 100 MMOL/L — SIGNIFICANT CHANGE UP (ref 96–108)
CHLORIDE SERPL-SCNC: 108 MMOL/L — SIGNIFICANT CHANGE UP (ref 96–108)
CO2 SERPL-SCNC: 16 MMOL/L — LOW (ref 22–31)
CO2 SERPL-SCNC: 18 MMOL/L — LOW (ref 22–31)
COLOR SPEC: SIGNIFICANT CHANGE UP
CREAT SERPL-MCNC: 4.28 MG/DL — HIGH (ref 0.5–1.3)
CREAT SERPL-MCNC: 4.53 MG/DL — HIGH (ref 0.5–1.3)
DIFF PNL FLD: ABNORMAL
EOSINOPHIL # BLD AUTO: 0.3 K/UL — SIGNIFICANT CHANGE UP (ref 0–0.5)
EOSINOPHIL NFR BLD AUTO: 1.9 % — SIGNIFICANT CHANGE UP (ref 0–6)
EPI CELLS # UR: SIGNIFICANT CHANGE UP /HPF
GLUCOSE SERPL-MCNC: 108 MG/DL — HIGH (ref 70–99)
GLUCOSE SERPL-MCNC: 97 MG/DL — SIGNIFICANT CHANGE UP (ref 70–99)
GLUCOSE UR QL: ABNORMAL
HCT VFR BLD CALC: 50.3 % — HIGH (ref 34.5–45)
HGB BLD-MCNC: 15.4 G/DL — SIGNIFICANT CHANGE UP (ref 11.5–15.5)
INR BLD: 1.13 RATIO — SIGNIFICANT CHANGE UP (ref 0.88–1.16)
KETONES UR-MCNC: NEGATIVE — SIGNIFICANT CHANGE UP
LEUKOCYTE ESTERASE UR-ACNC: NEGATIVE — SIGNIFICANT CHANGE UP
LYMPHOCYTES # BLD AUTO: 1.5 K/UL — SIGNIFICANT CHANGE UP (ref 1–3.3)
LYMPHOCYTES # BLD AUTO: 10.3 % — LOW (ref 13–44)
MCHC RBC-ENTMCNC: 23.6 PG — LOW (ref 27–34)
MCHC RBC-ENTMCNC: 30.7 GM/DL — LOW (ref 32–36)
MCV RBC AUTO: 77.1 FL — LOW (ref 80–100)
MONOCYTES # BLD AUTO: 0.5 K/UL — SIGNIFICANT CHANGE UP (ref 0–0.9)
MONOCYTES NFR BLD AUTO: 3.5 % — SIGNIFICANT CHANGE UP (ref 2–14)
NEUTROPHILS # BLD AUTO: 11.9 K/UL — HIGH (ref 1.8–7.4)
NEUTROPHILS NFR BLD AUTO: 84 % — HIGH (ref 43–77)
NITRITE UR-MCNC: NEGATIVE — SIGNIFICANT CHANGE UP
PH UR: 6.5 — SIGNIFICANT CHANGE UP (ref 5–8)
PLATELET # BLD AUTO: 498 K/UL — HIGH (ref 150–400)
POTASSIUM SERPL-MCNC: 4.3 MMOL/L — SIGNIFICANT CHANGE UP (ref 3.5–5.3)
POTASSIUM SERPL-MCNC: 6.5 MMOL/L — CRITICAL HIGH (ref 3.5–5.3)
POTASSIUM SERPL-SCNC: 4.3 MMOL/L — SIGNIFICANT CHANGE UP (ref 3.5–5.3)
POTASSIUM SERPL-SCNC: 6.5 MMOL/L — CRITICAL HIGH (ref 3.5–5.3)
PROT SERPL-MCNC: 8 G/DL — SIGNIFICANT CHANGE UP (ref 6–8.3)
PROT UR-MCNC: 500 MG/DL
PROTHROM AB SERPL-ACNC: 12.3 SEC — SIGNIFICANT CHANGE UP (ref 9.8–12.7)
RBC # BLD: 6.52 M/UL — HIGH (ref 3.8–5.2)
RBC # FLD: 21.4 % — HIGH (ref 10.3–14.5)
RBC CASTS # UR COMP ASSIST: SIGNIFICANT CHANGE UP /HPF (ref 0–2)
SODIUM SERPL-SCNC: 137 MMOL/L — SIGNIFICANT CHANGE UP (ref 135–145)
SODIUM SERPL-SCNC: 140 MMOL/L — SIGNIFICANT CHANGE UP (ref 135–145)
SP GR SPEC: 1.02 — SIGNIFICANT CHANGE UP (ref 1.01–1.02)
UROBILINOGEN FLD QL: NEGATIVE — SIGNIFICANT CHANGE UP
WBC # BLD: 14.2 K/UL — HIGH (ref 3.8–10.5)
WBC # FLD AUTO: 14.2 K/UL — HIGH (ref 3.8–10.5)
WBC UR QL: SIGNIFICANT CHANGE UP /HPF (ref 0–5)

## 2017-06-19 PROCEDURE — 93971 EXTREMITY STUDY: CPT | Mod: 26

## 2017-06-19 PROCEDURE — 99222 1ST HOSP IP/OBS MODERATE 55: CPT | Mod: GC

## 2017-06-19 PROCEDURE — 73630 X-RAY EXAM OF FOOT: CPT | Mod: 26,RT

## 2017-06-19 RX ORDER — CEFAZOLIN SODIUM 1 G
2000 VIAL (EA) INJECTION ONCE
Qty: 0 | Refills: 0 | Status: COMPLETED | OUTPATIENT
Start: 2017-06-19 | End: 2017-06-19

## 2017-06-19 RX ORDER — HEPARIN SODIUM 5000 [USP'U]/ML
5000 INJECTION INTRAVENOUS; SUBCUTANEOUS EVERY 8 HOURS
Qty: 0 | Refills: 0 | Status: DISCONTINUED | OUTPATIENT
Start: 2017-06-19 | End: 2017-06-21

## 2017-06-19 RX ORDER — SODIUM BICARBONATE 1 MEQ/ML
650 SYRINGE (ML) INTRAVENOUS DAILY
Qty: 0 | Refills: 0 | Status: DISCONTINUED | OUTPATIENT
Start: 2017-06-19 | End: 2017-06-21

## 2017-06-19 RX ORDER — FUROSEMIDE 40 MG
40 TABLET ORAL
Qty: 0 | Refills: 0 | Status: DISCONTINUED | OUTPATIENT
Start: 2017-06-19 | End: 2017-06-21

## 2017-06-19 RX ORDER — FUROSEMIDE 40 MG
40 TABLET ORAL DAILY
Qty: 0 | Refills: 0 | Status: DISCONTINUED | OUTPATIENT
Start: 2017-06-19 | End: 2017-06-19

## 2017-06-19 RX ORDER — FUROSEMIDE 40 MG
1 TABLET ORAL
Qty: 0 | Refills: 0 | COMMUNITY

## 2017-06-19 RX ORDER — AMLODIPINE BESYLATE 2.5 MG/1
10 TABLET ORAL DAILY
Qty: 0 | Refills: 0 | Status: DISCONTINUED | OUTPATIENT
Start: 2017-06-19 | End: 2017-06-21

## 2017-06-19 RX ORDER — CEFAZOLIN SODIUM 1 G
500 VIAL (EA) INJECTION EVERY 8 HOURS
Qty: 0 | Refills: 0 | Status: DISCONTINUED | OUTPATIENT
Start: 2017-06-20 | End: 2017-06-21

## 2017-06-19 RX ORDER — SODIUM CHLORIDE 9 MG/ML
500 INJECTION INTRAMUSCULAR; INTRAVENOUS; SUBCUTANEOUS ONCE
Qty: 0 | Refills: 0 | Status: COMPLETED | OUTPATIENT
Start: 2017-06-19 | End: 2017-06-19

## 2017-06-19 RX ORDER — ASPIRIN/CALCIUM CARB/MAGNESIUM 324 MG
81 TABLET ORAL DAILY
Qty: 0 | Refills: 0 | Status: DISCONTINUED | OUTPATIENT
Start: 2017-06-19 | End: 2017-06-21

## 2017-06-19 RX ORDER — ACETAMINOPHEN 500 MG
650 TABLET ORAL EVERY 6 HOURS
Qty: 0 | Refills: 0 | Status: DISCONTINUED | OUTPATIENT
Start: 2017-06-19 | End: 2017-06-21

## 2017-06-19 RX ORDER — ACETAMINOPHEN 500 MG
650 TABLET ORAL ONCE
Qty: 0 | Refills: 0 | Status: COMPLETED | OUTPATIENT
Start: 2017-06-19 | End: 2017-06-19

## 2017-06-19 RX ADMIN — Medication 650 MILLIGRAM(S): at 05:50

## 2017-06-19 RX ADMIN — Medication 40 MILLIGRAM(S): at 18:36

## 2017-06-19 RX ADMIN — Medication 650 MILLIGRAM(S): at 18:36

## 2017-06-19 RX ADMIN — AMLODIPINE BESYLATE 10 MILLIGRAM(S): 2.5 TABLET ORAL at 18:35

## 2017-06-19 RX ADMIN — SODIUM CHLORIDE 500 MILLILITER(S): 9 INJECTION INTRAMUSCULAR; INTRAVENOUS; SUBCUTANEOUS at 03:47

## 2017-06-19 RX ADMIN — Medication 100 MILLIGRAM(S): at 03:46

## 2017-06-19 RX ADMIN — Medication 650 MILLIGRAM(S): at 03:47

## 2017-06-19 RX ADMIN — Medication 81 MILLIGRAM(S): at 18:35

## 2017-06-19 NOTE — H&P ADULT - ATTENDING COMMENTS
57 yo woman with PMH of P.vera, HTN, CKD stage 5 a/w b/l foot pain/redness.  This has happened before to patient - was admitted about 6 months with same issue though only R foot.  She denies fevers/chills and any trauma/cuts to feet.    On exam, appears well, nontoxic. Chest: CTA b/l, no w/r/r. CV: RRR, nl S1S2. Abd: Soft, NT/ND. Ext: R > L dorsum of feet swollen, red, very tender, warm.      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

## 2017-06-19 NOTE — H&P ADULT - HISTORY OF PRESENT ILLNESS
56 F [place] F PMH PCV (dx 5 yrs ago), CKD, HTN, prior cellulitis RLE 1/2017, p/w few days of R foot pain, swelling, erythema.  Sx occuring on dorsum of right foot, now also seen to lesser degree on left foot.  +tender. Denies bites, denies trauma to either foot. +ambulatory despite sx.  Denies CP/SOB/f/c.  Denies n/v/d, urinary sx/cough or any other localizing s/s of infection.    In ED: VS: 98.0, 157/89, 77, 18, 98% RA. Labs showed: leukocytosis (14.2), neutrophilic predominance, Hemolyzed K with repeat in normal range, CKD 4-5, chronic elevated alkp (209), UA with white cells/prot/trace blood.  Doppler RLE negative for dvt. XR right foot shows soft tissue swelling but no fracture/dislocation/foreign body. 56 F [place] F PMH PCV (dx 5 yrs ago) c/b splenomegaly and splenic infarcts, CKD 5, HTN, prior cellulitis RLE 1/2017, p/w few days of R foot pain, swelling, erythema.  Sx occuring on dorsum of right foot, now also seen to lesser degree on left foot.  +tender. Denies bites, denies trauma to either foot. +ambulatory despite sx.  Denies CP/SOB/f/c.  Denies n/v/d, urinary sx/cough or any other localizing s/s of infection.    In ED: VS: 98.0, 157/89, 77, 18, 98% RA. Labs showed: leukocytosis (14.2), neutrophilic predominance, Hemolyzed K with repeat in normal range, CKD 4-5, chronic elevated alkp (209), UA with white cells/prot/trace blood.  Doppler RLE negative for dvt. XR right foot shows soft tissue swelling but no fracture/dislocation/foreign body.  Received Ancef 2 g x 1, 500 cc bolus, tylenol. 56 F [place] F PMH PCV (dx 5 yrs ago) c/b splenomegaly and splenic infarcts managed with phlebotomy and prev on hydroxyurea, CKD 5 with outpt discussions of PD, HTN, prior cellulitis RLE 1/2017 tx with ancef, p/w few days of R foot pain, swelling, erythema.  Sx occuring on dorsum of right foot, now also seen to lesser degree on left foot.  +tender. Denies bites, denies trauma to either foot. +ambulatory despite sx.  Denies CP/SOB/f/c.  Denies n/v/d, urinary sx/cough or any other localizing s/s of infection.    In ED: VS: 98.0, 157/89, 77, 18, 98% RA. Labs showed: leukocytosis (14.2), neutrophilic predominance, Hemolyzed K with repeat in normal range, CKD 4-5, chronic elevated alkp (209), UA with white cells/prot/trace blood.  Doppler RLE negative for dvt. XR right foot shows soft tissue swelling but no fracture/dislocation/foreign body.  Received Ancef 2 g x 1, 500 cc bolus, tylenol.

## 2017-06-19 NOTE — H&P ADULT - PROBLEM SELECTOR PLAN 1
-b/l foot pain in setting of PCV and prior treated cellulitis of RLE; concern at this time, given PCV, is that this b/l foot pain may not be cellulitis and may be erythromyalgia  -given mild white count and prior resolution with antibiotics, will continue with ancef at this time renally dosed at 500 q daily starting tomorrow (received 2 g in ED) -b/l foot pain in setting of PCV and prior treated cellulitis of RLE; concern at this time, given PCV, is that this b/l foot pain may not be cellulitis and may be erythromyalgia  -given mild white count and prior resolution with antibiotics, will continue with ancef at this time renally dosed at 500 q daily starting tomorrow (received 2 g in ED)  -f/u blood cultures, and f/u heme recs -b/l foot pain in setting of PCV and prior treated cellulitis of RLE; concern at this time, given PCV, is that this b/l foot pain may not be cellulitis and may be erythromelalgia  -given mild white count and prior resolution with antibiotics, will continue with ancef at this time renally dosed at 500 q daily starting tomorrow (received 2 g in ED)  -f/u blood cultures, and f/u heme recs

## 2017-06-19 NOTE — ED PROVIDER NOTE - ATTENDING CONTRIBUTION TO CARE
56yoF with recurrent erythema R dorsal foot.   TTP bilateral feet. +erythema R>L. warmth. No fluctuance. Pulses intact distally.   A: Cellulitis   P: IV abx, labs, pain meds as needed. admit for IV abx as recurrent high likelihood outpt failure.

## 2017-06-19 NOTE — H&P ADULT - NSHPPHYSICALEXAM_GEN_ALL_CORE
PHYSICAL EXAM:  GENERAL: NAD, well-groomed, well-developed  HEAD:  Atraumatic, Normocephalic  EYES: EOMI, PERRLA, conjunctiva and sclera clear  ENMT: No tonsillar erythema, exudates, or enlargement; Moist mucous membranes, Good dentition, No lesions  NECK: Supple, No JVD, Normal thyroid  CHEST/LUNG: Clear to percussion bilaterally; No rales, rhonchi, wheezing, or rubs  HEART: Regular rate and rhythm; No murmurs, rubs, or gallops  ABDOMEN: Soft, Nontender, Nondistended; Bowel sounds present  EXTREMITIES:  2+ Peripheral Pulses, No clubbing, cyanosis, or edema  LYMPH: No lymphadenopathy noted  SKIN: No rashes or lesions  NERVOUS SYSTEM:  Alert & Oriented X3, Good concentration; Motor Strength 5/5 B/L upper and lower extremities; DTRs 2+ intact and symmetric PHYSICAL EXAM:  GENERAL: NAD, well-groomed, well-developed  HEAD:  Atraumatic, Normocephalic  EYES: EOMI, PERRLA, conjunctiva and sclera clear  ENMT: No tonsillar erythema, exudates, or enlargement; Moist mucous membranes, Good dentition, No lesions  NECK: Supple, No JVD, Normal thyroid  CHEST/LUNG: Clear to percussion bilaterally; No rales, rhonchi, wheezing, or rubs  HEART: Regular rate and rhythm; No murmurs, rubs, or gallops  ABDOMEN: Soft, Nontender, Nondistended; Bowel sounds present  EXTREMITIES:  2+ Peripheral Pulses, No clubbing.  R foot dorsum swelling/erythema/TTP.  Left medial foot surface with mild erythema.  b/l TTP.   LYMPH: No lymphadenopathy noted  SKIN: No rashes or lesions other than extremity exam as above  NERVOUS SYSTEM:  Alert & Oriented X3, Good concentration; Motor Strength 5/5 B/L upper and lower extremities; DTRs 2+ intact and symmetric

## 2017-06-19 NOTE — H&P ADULT - ASSESSMENT
56 F [place] F PMH PCV (dx 5 yrs ago) c/b splenomegaly and splenic infarcts managed with phlebotomy and prev on hydroxyurea, CKD 5 with outpt discussions of PD, HTN, prior cellulitis RLE 1/2017 tx with ancef, p/w few days of R foot pain, swelling, erythema.

## 2017-06-19 NOTE — ED PROVIDER NOTE - OBJECTIVE STATEMENT
4-5 days of R top of foot swelling and redness, that is associated w/ burning and itching. Hx of RLE cellulitis 6 months ago that improved w/ IV atbx.  No Hx of DM or steroid.     PMD: Laure Guo; [facility]

## 2017-06-19 NOTE — H&P ADULT - PROBLEM SELECTOR PLAN 3
-Cr above baseline, last outpt 3.9.  Appears pt has stable Stage 5 ckd  -Outpt discussion with renal Dr. De León regarding PD; given documentation/legal status would not be candidate for transplant.  -Consider renal consult if Cr worsens; otherwise would defer to outpt assessment of renal replacement once discharged.

## 2017-06-19 NOTE — H&P ADULT - NSHPSOCIALHISTORY_GEN_ALL_CORE
Social History:    Marital Status:  ( x  )    (   ) Single    (   )    (  )   Occupation: [employer]      Substance Use (street drugs): (  ) never used  (  ) other:  Tobacco Usage:  (   ) never smoked   (  x ) former smoker   (   ) current smoker  (     ) pack year  (        ) last cigarette date  Alcohol Usage: social

## 2017-06-19 NOTE — CHART NOTE - NSCHARTNOTEFT_GEN_A_CORE
6/19/17 1605  PGY1 Transfer Note     Read through H&P, got sign out from admitting resident. Agree with plan. Saw patient at bedside who corroborated HPI. Patient stable and in NAD.     .  VITAL SIGNS:  T(C): 36.6, Max: 36.8 (06-19 @ 05:16)  T(F): 97.9, Max: 98.3 (06-19 @ 05:16)  HR: 72 (72 - 81)  BP: 164/100 (137/76 - 164/100)  BP(mean): --  RR: 18 (18 - 18)  SpO2: 100% (96% - 100%)  Wt(kg): --    PHYSICAL EXAM:    Constitutional: WDWN resting comfortably in bed; NAD  Head: NC/AT  Eyes: PERRL,  anicteric sclera  ENT: no nasal discharge; MMM  Neck: supple  Respiratory: CTA B/L; no W/R/R, no retractions  Cardiac: +S1/S2; RRR; no M/R/G  Gastrointestinal: soft, NT/ND; no rebound or guarding; +BS  Back: spine midline, no bony tenderness or step-offs  Extremities: WWP, no clubbing or cyanosis; RLE erythema, swelling, tenderness, and warmth over dorsum of right foot. LLE slight erythema, tenderness and warmth over medial dorsum of left foot.   Musculoskeletal: NROM; no joint swelling, tenderness or erythema  Neurologic: no focal deficits  Psychiatric: affect and characteristics of appearance, verbalizations, behaviors are appropriate

## 2017-06-19 NOTE — ED PROVIDER NOTE - NS ED ROS FT
No fever, no chills, no change in vision, no change in hearing, no chest pain, no shortness of breath, no abdominal pain, no vomiting, no dysuria, no muscle pain, +rashes, no loss of consciousness. ~ Jose Baltazar MD

## 2017-06-19 NOTE — ED PROVIDER NOTE - PHYSICAL EXAMINATION
Physical Exam: NAD, AAOx3, NCAT, MMM, neck is supple, PERRL, CTAB, normal rate and regular rhythm, abdomen is soft and NTND, No edema, No deformity of extremities, + erythema and swelling and warmth of the R dorsum of foot w/o crepitus or bullae, CN grossly intact, No focal motor or sensory deficits. ~ Jose Baltazar MD

## 2017-06-19 NOTE — H&P ADULT - NSHPLABSRESULTS_GEN_ALL_CORE
Labs reviewed personally: leukocytosis (14.2), neutrophilic predominance, Hemolyzed K with repeat in normal range, CKD 4-5, chronic elevated alkp (209), UA with white cells/prot/trace blood.      Images reviewed personally: Doppler RLE negative for dvt. XR right foot shows soft tissue swelling but no fracture/dislocation/foreign body.

## 2017-06-19 NOTE — H&P ADULT - FAMILY HISTORY
<<-----Click on this checkbox to enter Family History Family history of hypertension in mother     Grandparent  Still living? No  Family history of malignant neoplasm, Age at diagnosis: Age Unknown

## 2017-06-20 LAB
ALBUMIN SERPL ELPH-MCNC: 3.6 G/DL — SIGNIFICANT CHANGE UP (ref 3.3–5)
ALP SERPL-CCNC: 207 U/L — HIGH (ref 40–120)
ALT FLD-CCNC: 4 U/L RC — LOW (ref 10–45)
ANION GAP SERPL CALC-SCNC: 18 MMOL/L — HIGH (ref 5–17)
AST SERPL-CCNC: 12 U/L — SIGNIFICANT CHANGE UP (ref 10–40)
BASOPHILS # BLD AUTO: 0.1 K/UL — SIGNIFICANT CHANGE UP (ref 0–0.2)
BASOPHILS NFR BLD AUTO: 0.5 % — SIGNIFICANT CHANGE UP (ref 0–2)
BILIRUB SERPL-MCNC: 0.8 MG/DL — SIGNIFICANT CHANGE UP (ref 0.2–1.2)
BUN SERPL-MCNC: 67 MG/DL — HIGH (ref 7–23)
CALCIUM SERPL-MCNC: 8.6 MG/DL — SIGNIFICANT CHANGE UP (ref 8.4–10.5)
CHLORIDE SERPL-SCNC: 104 MMOL/L — SIGNIFICANT CHANGE UP (ref 96–108)
CO2 SERPL-SCNC: 16 MMOL/L — LOW (ref 22–31)
CREAT SERPL-MCNC: 4.32 MG/DL — HIGH (ref 0.5–1.3)
EOSINOPHIL # BLD AUTO: 0.3 K/UL — SIGNIFICANT CHANGE UP (ref 0–0.5)
EOSINOPHIL NFR BLD AUTO: 2.3 % — SIGNIFICANT CHANGE UP (ref 0–6)
GLUCOSE SERPL-MCNC: 104 MG/DL — HIGH (ref 70–99)
HCT VFR BLD CALC: 46.8 % — HIGH (ref 34.5–45)
HGB BLD-MCNC: 14.3 G/DL — SIGNIFICANT CHANGE UP (ref 11.5–15.5)
LYMPHOCYTES # BLD AUTO: 1.4 K/UL — SIGNIFICANT CHANGE UP (ref 1–3.3)
LYMPHOCYTES # BLD AUTO: 11.7 % — LOW (ref 13–44)
MAGNESIUM SERPL-MCNC: 2.1 MG/DL — SIGNIFICANT CHANGE UP (ref 1.6–2.6)
MCHC RBC-ENTMCNC: 23.2 PG — LOW (ref 27–34)
MCHC RBC-ENTMCNC: 30.5 GM/DL — LOW (ref 32–36)
MCV RBC AUTO: 76.3 FL — LOW (ref 80–100)
MONOCYTES # BLD AUTO: 0.6 K/UL — SIGNIFICANT CHANGE UP (ref 0–0.9)
MONOCYTES NFR BLD AUTO: 4.9 % — SIGNIFICANT CHANGE UP (ref 2–14)
NEUTROPHILS # BLD AUTO: 9.3 K/UL — HIGH (ref 1.8–7.4)
NEUTROPHILS NFR BLD AUTO: 80.6 % — HIGH (ref 43–77)
PHOSPHATE SERPL-MCNC: 5.2 MG/DL — HIGH (ref 2.5–4.5)
PLATELET # BLD AUTO: 440 K/UL — HIGH (ref 150–400)
POTASSIUM SERPL-MCNC: 4.4 MMOL/L — SIGNIFICANT CHANGE UP (ref 3.5–5.3)
POTASSIUM SERPL-SCNC: 4.4 MMOL/L — SIGNIFICANT CHANGE UP (ref 3.5–5.3)
PROT SERPL-MCNC: 7.2 G/DL — SIGNIFICANT CHANGE UP (ref 6–8.3)
RBC # BLD: 6.14 M/UL — HIGH (ref 3.8–5.2)
RBC # FLD: 20.6 % — HIGH (ref 10.3–14.5)
SODIUM SERPL-SCNC: 138 MMOL/L — SIGNIFICANT CHANGE UP (ref 135–145)
WBC # BLD: 11.6 K/UL — HIGH (ref 3.8–10.5)
WBC # FLD AUTO: 11.6 K/UL — HIGH (ref 3.8–10.5)

## 2017-06-20 PROCEDURE — 99232 SBSQ HOSP IP/OBS MODERATE 35: CPT | Mod: GC

## 2017-06-20 RX ORDER — CEPHALEXIN 500 MG
1 CAPSULE ORAL
Qty: 4 | Refills: 0 | OUTPATIENT
Start: 2017-06-20 | End: 2017-06-24

## 2017-06-20 RX ORDER — CEPHALEXIN 500 MG
1 CAPSULE ORAL
Qty: 16 | Refills: 0 | OUTPATIENT
Start: 2017-06-20 | End: 2017-06-24

## 2017-06-20 RX ADMIN — AMLODIPINE BESYLATE 10 MILLIGRAM(S): 2.5 TABLET ORAL at 06:06

## 2017-06-20 RX ADMIN — Medication 650 MILLIGRAM(S): at 11:28

## 2017-06-20 RX ADMIN — Medication 100 MILLIGRAM(S): at 22:37

## 2017-06-20 RX ADMIN — Medication 100 MILLIGRAM(S): at 14:11

## 2017-06-20 RX ADMIN — Medication 81 MILLIGRAM(S): at 11:28

## 2017-06-20 RX ADMIN — Medication 100 MILLIGRAM(S): at 06:39

## 2017-06-20 NOTE — DISCHARGE NOTE ADULT - CARE PROVIDERS DIRECT ADDRESSES
[email or web address]

## 2017-06-20 NOTE — DISCHARGE NOTE ADULT - PATIENT PORTAL LINK FT
“You can access the Follow[facility] Patient Portal, offered by [facility], by registering with the following website: [email or web address]”

## 2017-06-20 NOTE — DISCHARGE NOTE ADULT - CARE PLAN
Principal Discharge DX:	Cellulitis  Instructions for follow-up, activity and diet:	You  Secondary Diagnosis:	CKD (chronic kidney disease), stage 5  Instructions for follow-up, activity and diet:	Please continue with sodium bicarbonate 650mg once a day. Please follow up with Dr. De León in 1 week.  Secondary Diagnosis:	HTN (hypertension)  Instructions for follow-up, activity and diet:	Please continue your home amlodipine 10mg once a day and lasix 40mg Monday/Wednesday/Friday.  Secondary Diagnosis:	Polycythemia vera Principal Discharge DX:	Cellulitis  Instructions for follow-up, activity and diet:	You came in with swelling, redness, and tenderness of your feet. Imaging was negative for anything. You were determined to have a superficial skin infection of your feet. You were started on IV antibiotics and your symptoms improved. Please take Keflex 500mg one tab every 6 hours. Please follow up with your PCP in  Secondary Diagnosis:	CKD (chronic kidney disease), stage 5  Instructions for follow-up, activity and diet:	Please continue with sodium bicarbonate 650mg once a day. Please follow up with Dr. De León in 1 week.  Secondary Diagnosis:	HTN (hypertension)  Instructions for follow-up, activity and diet:	Please continue your home amlodipine 10mg once a day and lasix 40mg Monday/Wednesday/Friday.  Secondary Diagnosis:	Polycythemia vera Principal Discharge DX:	Cellulitis  Goal:	improvement  Instructions for follow-up, activity and diet:	You came in with swelling, redness, and tenderness of your feet. Imaging was negative for anything. You were determined to have a superficial skin infection of your feet. You were started on IV antibiotics and your symptoms improved. Please take Keflex 250mg one tab every 6 hours until 6/24. Please follow up with your PCP in 1 week. Please avoid NSAIDs for pain control given your kidney injury  Secondary Diagnosis:	CKD (chronic kidney disease), stage 5  Instructions for follow-up, activity and diet:	Please continue with sodium bicarbonate 650mg once a day. Please follow up with Dr. De León in 1 week.  Secondary Diagnosis:	HTN (hypertension)  Instructions for follow-up, activity and diet:	Please continue your home amlodipine 10mg once a day and lasix 40mg Monday/Wednesday/Friday. Please follow up with your PCP in 1 week.  Secondary Diagnosis:	Polycythemia vera  Instructions for follow-up, activity and diet:	You have a history of polycythemia vera. You were seen by hematology while in the hospital and they didn't recommend phlebotomy or hydroxyurea at this time. Please follow up with  Principal Discharge DX:	Cellulitis  Goal:	improvement  Instructions for follow-up, activity and diet:	You came in with swelling, redness, and tenderness of your feet. Imaging was negative for anything. You were determined to have a superficial skin infection of your feet. You were started on IV antibiotics and your symptoms improved. Please take Keflex 250mg one tab every 6 hours until 6/24. Please follow up with your PCP in 1 week. Please avoid NSAIDs for pain control given your kidney injury  Secondary Diagnosis:	CKD (chronic kidney disease), stage 5  Instructions for follow-up, activity and diet:	Please continue with sodium bicarbonate 650mg once a day. Please follow up with Dr. De León in 1 week.  Secondary Diagnosis:	HTN (hypertension)  Instructions for follow-up, activity and diet:	Please continue your home amlodipine 10mg once a day and lasix 40mg Monday/Wednesday/Friday. Please follow up with your PCP in 1 week.  Secondary Diagnosis:	Polycythemia vera  Instructions for follow-up, activity and diet:	You have a history of polycythemia vera. You were seen by hematology while in the hospital and they didn't recommend phlebotomy or hydroxyurea at this time. Please follow up with Dr. Elmore (hematology) in 1 week. Principal Discharge DX:	Cellulitis  Goal:	improvement  Instructions for follow-up, activity and diet:	You came in with swelling, redness, and tenderness of your feet. Imaging was negative for anything. You were determined to have a superficial skin infection of your feet. You were started on IV antibiotics and your symptoms improved. Please take Keflex 250mg one tab once a day until 6/24. Please follow up with your PCP in 1 week. Please avoid NSAIDs for pain control given your kidney injury  Secondary Diagnosis:	CKD (chronic kidney disease), stage 5  Instructions for follow-up, activity and diet:	Please continue with sodium bicarbonate 650mg once a day. Please follow up with Dr. De León in 1 week.  Secondary Diagnosis:	HTN (hypertension)  Instructions for follow-up, activity and diet:	Please continue your home amlodipine 10mg once a day and lasix 40mg Monday/Wednesday/Friday. Please follow up with your PCP in 1 week.  Secondary Diagnosis:	Polycythemia vera  Instructions for follow-up, activity and diet:	You have a history of polycythemia vera. You were seen by hematology while in the hospital and recommended 250cc phlebotomy because your Hct was above the goal of 42. They didn't recommend hydroxyurea at this time. Please follow up with Dr. Norris (hematology) in 1 week. Principal Discharge DX:	Cellulitis  Goal:	improvement  Instructions for follow-up, activity and diet:	You came in with swelling, redness, and tenderness of your feet. Imaging was negative for anything. You were determined to have a superficial skin infection of your feet. You were started on IV antibiotics and your symptoms improved. Please take Keflex 250mg one tab once a day until 6/24. Please follow up with your PCP in 1 week. Please avoid NSAIDs for pain control given your kidney injury  Secondary Diagnosis:	CKD (chronic kidney disease), stage 5  Instructions for follow-up, activity and diet:	Please continue with sodium bicarbonate 650mg once a day. Please follow up with Dr. De León in 1 week.  Secondary Diagnosis:	HTN (hypertension)  Instructions for follow-up, activity and diet:	Please continue your home amlodipine 10mg once a day and lasix 40mg Monday/Wednesday/Friday. Please follow up with your PCP in 1 week.  Secondary Diagnosis:	Polycythemia vera  Instructions for follow-up, activity and diet:	You have a history of polycythemia vera. You were seen by hematology while in the hospital and recommended 500cc phlebotomy because your Hct was above the goal of 42. They didn't recommend hydroxyurea at this time. Please follow up with Dr. Norris (hematology) in 1 week.

## 2017-06-20 NOTE — DISCHARGE NOTE ADULT - HOSPITAL COURSE
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 b/l cellulitis.     Patient was started ancef and her symptoms improved. Hematology was consulted and recommended ____. 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 b/l cellulitis.     Patient was started ancef and her symptoms improved. Hematology was consulted and didn't believe this was erythomalalgia. Also stated patient was at her baseline Hct and no need for phlebotomy or hydroxyurea at this time. Patient was continued on keflex 250mg Q6hr for a full 5 day course outpatient. 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 b/l cellulitis.     Patient was started ancef and her symptoms improved. Hematology was consulted and didn't believe this was erythomalalgia. Stated no need for hydroxyurea at this time. Hct was above goal of 42 and thus had 250cc phlebotomy inpatient and then was dc'dPatient was continued on keflex 250mg QD for a full 5 day course outpatient. 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 b/l cellulitis.     Patient was started ancef and her symptoms improved. Hematology was consulted and didn't believe this was erythomalalgia. Stated no need for hydroxyurea at this time. Hct was above goal of 42 and thus had 500cc phlebotomy inpatient and then was dc'dPatient was continued on keflex 250mg QD for a full 5 day course outpatient. 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 b/l cellulitis.     Patient was started ancef and her symptoms improved. Hematology was consulted and didn't believe this was erythomelalgia. Stated no need for hydroxyurea at this time. Hct was above goal of 42 and thus had 500cc phlebotomy inpatient and then was dc'dPatient was continued on keflex 250mg QD for a full 5 day course outpatient.

## 2017-06-20 NOTE — PROGRESS NOTE ADULT - ASSESSMENT
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

## 2017-06-20 NOTE — PROGRESS NOTE ADULT - ATTENDING COMMENTS
Reports feet are better today.  Still painful and red but improved.  Able to walk.  On exam, R foot redness has receded and barely any on L foot now.      1) Cellulitis vs erythromelalgia - pending heme consult to get their impression and see if any further treatment is necessary - if they are convinced this is erythromelalgia would stop abx - c/w home ASA    2) CKD stage 5 - Cr slightly worse now but barely a GFR difference - outpt HD planning in the works - team to call pt's nephrologist Dr. De León to discuss

## 2017-06-20 NOTE — DISCHARGE NOTE ADULT - PLAN OF CARE
Please continue your home amlodipine 10mg once a day and lasix 40mg Monday/Wednesday/Friday. Please continue with sodium bicarbonate 650mg once a day. Please follow up with Dr. De León in 1 week. You You came in with swelling, redness, and tenderness of your feet. Imaging was negative for anything. You were determined to have a superficial skin infection of your feet. You were started on IV antibiotics and your symptoms improved. Please take Keflex 500mg one tab every 6 hours. Please follow up with your PCP in You have a history of polycythemia vera. You were seen by hematology while in the hospital and they didn't recommend phlebotomy or hydroxyurea at this time. Please follow up with  improvement Please continue your home amlodipine 10mg once a day and lasix 40mg Monday/Wednesday/Friday. Please follow up with your PCP in 1 week. You came in with swelling, redness, and tenderness of your feet. Imaging was negative for anything. You were determined to have a superficial skin infection of your feet. You were started on IV antibiotics and your symptoms improved. Please take Keflex 250mg one tab every 6 hours until 6/24. Please follow up with your PCP in 1 week. Please avoid NSAIDs for pain control given your kidney injury You have a history of polycythemia vera. You were seen by hematology while in the hospital and they didn't recommend phlebotomy or hydroxyurea at this time. Please follow up with Dr. Elmore (hematology) in 1 week. You came in with swelling, redness, and tenderness of your feet. Imaging was negative for anything. You were determined to have a superficial skin infection of your feet. You were started on IV antibiotics and your symptoms improved. Please take Keflex 250mg one tab once a day until 6/24. Please follow up with your PCP in 1 week. Please avoid NSAIDs for pain control given your kidney injury You have a history of polycythemia vera. You were seen by hematology while in the hospital and recommended 250cc phlebotomy because your Hct was above the goal of 42. They didn't recommend hydroxyurea at this time. Please follow up with Dr. Norris (hematology) in 1 week. You have a history of polycythemia vera. You were seen by hematology while in the hospital and recommended 500cc phlebotomy because your Hct was above the goal of 42. They didn't recommend hydroxyurea at this time. Please follow up with Dr. Norris (hematology) in 1 week.

## 2017-06-20 NOTE — DISCHARGE NOTE ADULT - CARE PROVIDER_API CALL
Azucena De León), Internal Medicine; Nephrology  [address] [place] [address]  Phone: [phone]  Fax: [phone] Azucena De León), Internal Medicine; Nephrology  [address] [place] [address]  Phone: [phone]  Fax: [phone]    El Elmore), Hematology; Medical Oncology  [address] [place] [address]  Phone: [phone]  Fax: [phone] Azucena De León), Internal Medicine; Nephrology  [address] [place] [address]  Phone: [phone]  Fax: [phone]

## 2017-06-20 NOTE — PROGRESS NOTE ADULT - PROBLEM SELECTOR PLAN 1
-b/l foot pain in setting of PCV and prior treated cellulitis of RLE; concern at this time, given PCV, is that this b/l foot pain may not be cellulitis and may be erythromelalgia  -given mild white count and prior resolution with antibiotics, will continue with ancef at this time renally dosed at 500 q daily  - f/u heme recs

## 2017-06-20 NOTE — CONSULT NOTE ADULT - SUBJECTIVE AND OBJECTIVE BOX
HPI:  56 F polycythemia vera complicated by splenomegaly and splenic infarcts managed with phlebotomy and previously on hydroxyurea, CKD V admitted with recurrent RLE cellulitis.  Patient states her foot was swollen, erythematous and tender but has improved on IV antibiotics. Pt denies chest pains, fevers/chills, nausea/vomiting, shortness of breath. 10 point ROS otherwise negative.    Allergies    No Known Allergies    MEDICATIONS  (STANDING):  heparin  Injectable 5000Unit(s) SubCutaneous every 8 hours  aspirin  chewable 81milliGRAM(s) Oral daily  amLODIPine   Tablet 10milliGRAM(s) Oral daily  ceFAZolin   IVPB 500milliGRAM(s) IV Intermittent every 8 hours  sodium bicarbonate 650milliGRAM(s) Oral daily  furosemide    Tablet 40milliGRAM(s) Oral <User Schedule>    MEDICATIONS  (PRN):  acetaminophen   Tablet. 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  Splenomegaly: 2015  Hypertension  Peptic ulcer disease  Polycythemia vera  No significant past surgical history      FAMILY HISTORY:  Family history of malignant neoplasm (Grandparent)  Family history of hypertension in mother  No pertinent family history in first degree relatives      SOCIAL HISTORY: No EtOH, no tobacco    T(F): 97.8, Max: 98.3 (06-19 @ 20:58)  HR: 82  BP: 135/93  RR: 18  SpO2: 97%  Wt(kg): --    GENERAL: NAD, well-developed  HEAD:  Atraumatic, Normocephalic  EYES: EOMI, PERRLA, conjunctiva and sclera clear  NECK: Supple, No JVD  CHEST/LUNG: Clear to auscultation bilaterally; No wheeze  HEART: Regular rate and rhythm; No murmurs, rubs, or gallops  ABDOMEN: Soft, Nontender, Nondistended; Bowel sounds present  EXTREMITIES:  2+ Peripheral Pulses, R dorsal foot erythematous and swollen with 2+ pedal edema  NEUROLOGY: non-focal  SKIN: No rashes or lesions                          14.3   11.6  )-----------( 440      ( 20 Jun 2017 05:57 )             46.8       06-20    138  |  104  |  67<H>  ----------------------------<  104<H>  4.4   |  16<L>  |  4.32<H>    Ca    8.6      20 Jun 2017 05:57  Phos  5.2     06-20  Mg     2.1     06-20    TPro  7.2  /  Alb  3.6  /  TBili  0.8  /  DBili  x   /  AST  12  /  ALT  4<L>  /  AlkPhos  207<H>  06-20      Magnesium, Serum: 2.1 mg/dL (06-20 @ 05:57)  Phosphorus Level, Serum: 5.2 mg/dL (06-20 @ 05:57)

## 2017-06-20 NOTE — PROGRESS NOTE ADULT - PROBLEM SELECTOR PLAN 2
-Per allscripts, unclear if pt on hydrea; patient states she was instructed to stop it because her kidney function was decreasing  -f/u hematology  -c/w aspirin per home med

## 2017-06-20 NOTE — PROGRESS NOTE ADULT - SUBJECTIVE AND OBJECTIVE BOX
Patient is a 56y old  Female who presents with a chief complaint of Foot pain/swelling (2017 14:32)        SUBJECTIVE / OVERNIGHT EVENTS:  no overnight events. Patient states her feet swelling are decreasing. Had an episode of CP overnight which resolved without intervention. No SOB, abdom pain, N/V, F/C.     MEDICATIONS  (STANDING):  heparin  Injectable 5000Unit(s) SubCutaneous every 8 hours  aspirin  chewable 81milliGRAM(s) Oral daily  amLODIPine   Tablet 10milliGRAM(s) Oral daily  ceFAZolin   IVPB 500milliGRAM(s) IV Intermittent every 8 hours  sodium bicarbonate 650milliGRAM(s) Oral daily  furosemide    Tablet 40milliGRAM(s) Oral <User Schedule>    MEDICATIONS  (PRN):  acetaminophen   Tablet. 650milliGRAM(s) Oral every 6 hours PRN Mild Pain (1 - 3)      Vital Signs Last 24 Hrs  T(C): 36.6, Max: 36.8 (06-19 @ 20:58)  HR: 81 (72 - 81)  BP: 142/87 (137/76 - 164/100)  RR: 18 (18 - 18)  SpO2: 97% (96% - 100%)  Wt(kg): --  CAPILLARY BLOOD GLUCOSE    I&O's Summary    I & Os for current day (as of 2017 08:05)  =============================================  IN: 680 ml / OUT: 750 ml / NET: -70 ml      PHYSICAL EXAM  GENERAL: NAD, well-developed  HEAD:  Atraumatic, Normocephalic  EYES: EOMI, conjunctiva and sclera clear  NECK: Supple,   CHEST/LUNG: Clear to auscultation bilaterally; No wheeze  HEART: Regular rate and rhythm; No murmurs, rubs, or gallops  ABDOMEN: Soft, Nontender, Nondistended; Bowel sounds present  EXTREMITIES:  2+ Peripheral Pulses, No clubbing, cyanosis. RLE dorsum of foot erythema, tenderness, warmth. LLE medial dorsum of foot slight erythema, tenderness, warmth. - slightly improved  PSYCH: AAOx3  SKIN: RLE dorsum of foot erythema, tenderness, warmth. LLE medial dorsum of foot slight erythema, tenderness, warmth. - slightly improved    LABS:                        14.3   11.6  )-----------( 440      ( 2017 05:57 )             46.8         138  |  104  |  67<H>  ----------------------------<  104<H>  4.4   |  16<L>  |  4.32<H>    Ca    8.6      2017 05:57  Phos  5.2       Mg     2.1         TPro  7.2  /  Alb  3.6  /  TBili  0.8  /  DBili  x   /  AST  12  /  ALT  4<L>  /  AlkPhos  207<H>      PT/INR - ( 2017 02:51 )   PT: 12.3 sec;   INR: 1.13 ratio         PTT - ( 2017 02:51 )  PTT:42.7 sec      Urinalysis Basic - ( 2017 02:51 )    Color: PL Yellow / Appearance: Clear / S.016 / pH: x  Gluc: x / Ketone: Negative  / Bili: Negative / Urobili: Negative   Blood: x / Protein: 500 mg/dL / Nitrite: Negative   Leuk Esterase: Negative / RBC: 0-2 /HPF / WBC 11-25 /HPF   Sq Epi: x / Non Sq Epi: Few /HPF / Bacteria: Few /HPF        RADIOLOGY & ADDITIONAL TESTS:    Imaging Personally Reviewed:  Consultant(s) Notes Reviewed:    Care Discussed with Consultants/Other Providers:

## 2017-06-20 NOTE — CONSULT NOTE ADULT - PROBLEM SELECTOR RECOMMENDATION 9
Hct above goal of <42, continue to trend daily it is downtrending but may require small phlebotomy (~250 cc)  -patient clinically improved on antibiotic, will follow up with Dr. Norris outpatient to discuss further P. Vera management

## 2017-06-20 NOTE — DISCHARGE NOTE ADULT - MEDICATION SUMMARY - MEDICATIONS TO TAKE
I will START or STAY ON the medications listed below when I get home from the hospital:    aspirin 81 mg oral tablet  -- 1 tab(s) by mouth once a day  -- Indication: For Polycythemia vera    sodium bicarbonate 650 mg oral tablet  -- 1 tab(s) by mouth once a day  -- Indication: For CKD (chronic kidney disease), stage 5    amLODIPine 10 mg oral tablet  -- 1 tab(s) by mouth once a day  -- Indication: For HTN (hypertension)    Keflex 250 mg oral capsule  -- 1 cap(s) by mouth once a day  -- Finish all this medication unless otherwise directed by prescriber.    -- Indication: For Cellulitis    Lasix 40 mg oral tablet  -- 1 tab(s) by mouth 3 times a week M/W/F  -- Indication: For HTN (hypertension)    Vitamin D2 50,000 intl units (1.25 mg) oral capsule  -- 1 cap(s) by mouth once a week  -- Indication: For CKD (chronic kidney disease), stage 5

## 2017-06-21 ENCOUNTER — RESULT CHARGE (OUTPATIENT)
Age: 57
End: 2017-06-21

## 2017-06-21 VITALS
HEART RATE: 83 BPM | TEMPERATURE: 98 F | SYSTOLIC BLOOD PRESSURE: 122 MMHG | OXYGEN SATURATION: 97 % | RESPIRATION RATE: 18 BRPM | WEIGHT: 118.17 LBS | DIASTOLIC BLOOD PRESSURE: 81 MMHG

## 2017-06-21 LAB
ANION GAP SERPL CALC-SCNC: 20 MMOL/L — HIGH (ref 5–17)
BUN SERPL-MCNC: 78 MG/DL — HIGH (ref 7–23)
CALCIUM SERPL-MCNC: 8.5 MG/DL — SIGNIFICANT CHANGE UP (ref 8.4–10.5)
CHLORIDE SERPL-SCNC: 102 MMOL/L — SIGNIFICANT CHANGE UP (ref 96–108)
CO2 SERPL-SCNC: 17 MMOL/L — LOW (ref 22–31)
CREAT SERPL-MCNC: 4.55 MG/DL — HIGH (ref 0.5–1.3)
GLUCOSE SERPL-MCNC: 101 MG/DL — HIGH (ref 70–99)
HCT VFR BLD CALC: 47.8 % — HIGH (ref 34.5–45)
HGB BLD-MCNC: 14.5 G/DL — SIGNIFICANT CHANGE UP (ref 11.5–15.5)
MCHC RBC-ENTMCNC: 23.2 PG — LOW (ref 27–34)
MCHC RBC-ENTMCNC: 30.3 GM/DL — LOW (ref 32–36)
MCV RBC AUTO: 76.5 FL — LOW (ref 80–100)
PLATELET # BLD AUTO: 478 K/UL — HIGH (ref 150–400)
POTASSIUM SERPL-MCNC: 5.2 MMOL/L — SIGNIFICANT CHANGE UP (ref 3.5–5.3)
POTASSIUM SERPL-SCNC: 5.2 MMOL/L — SIGNIFICANT CHANGE UP (ref 3.5–5.3)
RBC # BLD: 6.24 M/UL — HIGH (ref 3.8–5.2)
RBC # FLD: 21.1 % — HIGH (ref 10.3–14.5)
SODIUM SERPL-SCNC: 139 MMOL/L — SIGNIFICANT CHANGE UP (ref 135–145)
WBC # BLD: 12.5 K/UL — HIGH (ref 3.8–10.5)
WBC # FLD AUTO: 12.5 K/UL — HIGH (ref 3.8–10.5)

## 2017-06-21 PROCEDURE — 99285 EMERGENCY DEPT VISIT HI MDM: CPT | Mod: 25

## 2017-06-21 PROCEDURE — 93005 ELECTROCARDIOGRAM TRACING: CPT

## 2017-06-21 PROCEDURE — 85610 PROTHROMBIN TIME: CPT

## 2017-06-21 PROCEDURE — 85027 COMPLETE CBC AUTOMATED: CPT

## 2017-06-21 PROCEDURE — 83735 ASSAY OF MAGNESIUM: CPT

## 2017-06-21 PROCEDURE — 85730 THROMBOPLASTIN TIME PARTIAL: CPT

## 2017-06-21 PROCEDURE — 36514 APHERESIS PLASMA: CPT

## 2017-06-21 PROCEDURE — 80048 BASIC METABOLIC PNL TOTAL CA: CPT

## 2017-06-21 PROCEDURE — 81001 URINALYSIS AUTO W/SCOPE: CPT

## 2017-06-21 PROCEDURE — 87040 BLOOD CULTURE FOR BACTERIA: CPT

## 2017-06-21 PROCEDURE — 84100 ASSAY OF PHOSPHORUS: CPT

## 2017-06-21 PROCEDURE — 73630 X-RAY EXAM OF FOOT: CPT

## 2017-06-21 PROCEDURE — 99239 HOSP IP/OBS DSCHRG MGMT >30: CPT

## 2017-06-21 PROCEDURE — 80053 COMPREHEN METABOLIC PANEL: CPT

## 2017-06-21 PROCEDURE — 93971 EXTREMITY STUDY: CPT

## 2017-06-21 PROCEDURE — 96374 THER/PROPH/DIAG INJ IV PUSH: CPT

## 2017-06-21 RX ORDER — CEPHALEXIN 500 MG
1 CAPSULE ORAL
Qty: 3 | Refills: 0 | OUTPATIENT
Start: 2017-06-21 | End: 2017-06-24

## 2017-06-21 RX ADMIN — Medication 100 MILLIGRAM(S): at 05:16

## 2017-06-21 RX ADMIN — Medication 81 MILLIGRAM(S): at 12:17

## 2017-06-21 RX ADMIN — Medication 40 MILLIGRAM(S): at 09:22

## 2017-06-21 RX ADMIN — Medication 650 MILLIGRAM(S): at 12:17

## 2017-06-21 RX ADMIN — Medication 100 MILLIGRAM(S): at 14:40

## 2017-06-21 RX ADMIN — AMLODIPINE BESYLATE 10 MILLIGRAM(S): 2.5 TABLET ORAL at 05:16

## 2017-06-21 NOTE — PROGRESS NOTE ADULT - ATTENDING COMMENTS
Feet appear better than yesterday, just mild redness/warmth on dorsum of R foot now, L foot appears normal.  Able to walk fine.  Heme recommends 250mL phlebotomy for high Hct which was done today.  Team checked with renal who is not concerned with pt's creatinine being slightly higher than previous baseline - plan remains for outpt HD planning.  Will d/c home today to continue course of keflex.  34 mins spend on d/c.

## 2017-06-21 NOTE — PROGRESS NOTE ADULT - SUBJECTIVE AND OBJECTIVE BOX
Patient is a 56y old  Female who presents with a chief complaint of Foot pain/swelling (20 Jun 2017 11:18)        SUBJECTIVE / OVERNIGHT EVENTS:  no events overnight. Patient states her b/l LE swelling is improving. States she is still having electric shock-like pain over the areas of cellulitis but the pain is not as severe. Denies any CP, SOB, abdom pain, N/V, F/C.     MEDICATIONS  (STANDING):  heparin  Injectable 5000Unit(s) SubCutaneous every 8 hours  aspirin  chewable 81milliGRAM(s) Oral daily  amLODIPine   Tablet 10milliGRAM(s) Oral daily  ceFAZolin   IVPB 500milliGRAM(s) IV Intermittent every 8 hours  sodium bicarbonate 650milliGRAM(s) Oral daily  furosemide    Tablet 40milliGRAM(s) Oral <User Schedule>    MEDICATIONS  (PRN):  acetaminophen   Tablet. 650milliGRAM(s) Oral every 6 hours PRN Mild Pain (1 - 3)      Vital Signs Last 24 Hrs  T(C): 36.6, Max: 37.1 (06-20 @ 21:00)  HR: 76 (76 - 87)  BP: 150/84 (111/73 - 150/84)  RR: 18 (18 - 18)  SpO2: 96% (96% - 97%)  Wt(kg): --  CAPILLARY BLOOD GLUCOSE    I&O's Summary    I & Os for current day (as of 21 Jun 2017 07:57)  =============================================  IN: 990 ml / OUT: 700 ml / NET: 290 ml      PHYSICAL EXAM  GENERAL: NAD, well-developed  HEAD:  Atraumatic, Normocephalic  EYES: EOMI, conjunctiva and sclera clear  NECK: Supple  CHEST/LUNG: Clear to auscultation bilaterally; No wheeze  HEART: Regular rate and rhythm; No murmurs, rubs, or gallops  ABDOMEN: Soft, Nontender, Nondistended; Bowel sounds present  EXTREMITIES:  2+ Peripheral Pulses, No clubbing, cyanosis. RLE dorsum of foot erythema, swelling, and tenderness - improved. LLE medial dorsum of foot erythema, swelling, and tenderness - improved  PSYCH: AAOx3  SKIN: RLE dorsum of foot erythema, swelling, and tenderness - improved. LLE medial dorsum of foot erythema, swelling, and tenderness - improved    LABS:                        14.5   12.5  )-----------( 478      ( 21 Jun 2017 06:46 )             47.8     06-21    139  |  102  |  78<H>  ----------------------------<  101<H>  5.2   |  17<L>  |  4.55<H>    Ca    8.5      21 Jun 2017 06:46  Phos  5.2     06-20  Mg     2.1     06-20    TPro  7.2  /  Alb  3.6  /  TBili  0.8  /  DBili  x   /  AST  12  /  ALT  4<L>  /  AlkPhos  207<H>  06-20              RADIOLOGY & ADDITIONAL TESTS:    Imaging Personally Reviewed:  Consultant(s) Notes Reviewed:    Care Discussed with Consultants/Other Providers:

## 2017-06-21 NOTE — PROGRESS NOTE ADULT - PROBLEM SELECTOR PLAN 3
-Cr above baseline, last outpt 3.9.  Appears pt has stable Stage 5 ckd  -Outpt discussion with renal Dr. De León regarding PD; given documentation/legal status would not be candidate for transplant.  -Spoke with renal over the phone: given high Cr. at baseline, levels can fluctuate. No indication for renal consult at this point because patient is at her baseline. F/u outpt with Dr. De León

## 2017-06-21 NOTE — PROGRESS NOTE ADULT - PROBLEM SELECTOR PLAN 1
-b/l foot pain in setting of PCV and prior treated cellulitis of RLE; concern at this time, given PCV, is that this b/l foot pain may not be cellulitis and may be erythromelalgia  -given symptomatic improvement, will continue with ancef at this time renally dosed at 500 q daily  - f/u heme recs

## 2017-06-21 NOTE — PROGRESS NOTE ADULT - PROBLEM SELECTOR PLAN 2
-f/u hematology: no need for hydroxyurea at this time, if Hct >45, can do a small 250cc phlebotomy, f/u outpatient with Dr. Norris  -c/w aspirin per home med

## 2017-06-21 NOTE — CHART NOTE - NSCHARTNOTEFT_GEN_A_CORE
[facility] patient's hematocrit has been persistently above goal of 42% for known polycythemia vera, will follow up outpatient with Dr. Norris.

## 2017-06-21 NOTE — PROGRESS NOTE ADULT - ASSESSMENT
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

## 2017-06-22 ENCOUNTER — NON-APPOINTMENT (OUTPATIENT)
Age: 57
End: 2017-06-22

## 2017-06-22 ENCOUNTER — OUTPATIENT (OUTPATIENT)
Dept: OUTPATIENT SERVICES | Facility: HOSPITAL | Age: 57
LOS: 1 days | End: 2017-06-22
Payer: SELF-PAY

## 2017-06-22 ENCOUNTER — APPOINTMENT (OUTPATIENT)
Dept: INTERNAL MEDICINE | Facility: CLINIC | Age: 57
End: 2017-06-22

## 2017-06-22 VITALS
HEIGHT: 60 IN | OXYGEN SATURATION: 98 % | BODY MASS INDEX: 23.16 KG/M2 | HEART RATE: 74 BPM | WEIGHT: 118 LBS | SYSTOLIC BLOOD PRESSURE: 120 MMHG | DIASTOLIC BLOOD PRESSURE: 88 MMHG

## 2017-06-22 DIAGNOSIS — I10 ESSENTIAL (PRIMARY) HYPERTENSION: ICD-10-CM

## 2017-06-22 PROCEDURE — G0463: CPT

## 2017-06-22 PROCEDURE — 93005 ELECTROCARDIOGRAM TRACING: CPT

## 2017-06-22 RX ORDER — ERGOCALCIFEROL 1.25 MG/1
1.25 MG CAPSULE, LIQUID FILLED ORAL
Qty: 12 | Refills: 0 | Status: COMPLETED | COMMUNITY
Start: 2017-03-09 | End: 2017-06-22

## 2017-06-24 LAB
CULTURE RESULTS: SIGNIFICANT CHANGE UP
CULTURE RESULTS: SIGNIFICANT CHANGE UP
SPECIMEN SOURCE: SIGNIFICANT CHANGE UP
SPECIMEN SOURCE: SIGNIFICANT CHANGE UP

## 2017-06-26 DIAGNOSIS — L03.90 CELLULITIS, UNSPECIFIED: ICD-10-CM

## 2017-06-26 DIAGNOSIS — D45 POLYCYTHEMIA VERA: ICD-10-CM

## 2017-06-26 DIAGNOSIS — E87.5 HYPERKALEMIA: ICD-10-CM

## 2017-06-26 DIAGNOSIS — N18.5 CHRONIC KIDNEY DISEASE, STAGE 5: ICD-10-CM

## 2017-06-28 ENCOUNTER — LABORATORY RESULT (OUTPATIENT)
Age: 57
End: 2017-06-28

## 2017-06-28 ENCOUNTER — OUTPATIENT (OUTPATIENT)
Dept: OUTPATIENT SERVICES | Facility: HOSPITAL | Age: 57
LOS: 1 days | End: 2017-06-28
Payer: SELF-PAY

## 2017-06-28 ENCOUNTER — APPOINTMENT (OUTPATIENT)
Dept: NEPHROLOGY | Facility: CLINIC | Age: 57
End: 2017-06-28

## 2017-06-28 VITALS
BODY MASS INDEX: 23.59 KG/M2 | OXYGEN SATURATION: 99 % | WEIGHT: 120.15 LBS | DIASTOLIC BLOOD PRESSURE: 93 MMHG | SYSTOLIC BLOOD PRESSURE: 154 MMHG | HEART RATE: 72 BPM | HEIGHT: 60 IN

## 2017-06-28 PROCEDURE — G0463: CPT

## 2017-06-28 RX ORDER — CEPHALEXIN 250 MG/1
250 CAPSULE ORAL
Refills: 0 | Status: DISCONTINUED | COMMUNITY
Start: 2017-06-22 | End: 2017-06-28

## 2017-06-30 DIAGNOSIS — N18.5 CHRONIC KIDNEY DISEASE, STAGE 5: ICD-10-CM

## 2017-06-30 DIAGNOSIS — I10 ESSENTIAL (PRIMARY) HYPERTENSION: ICD-10-CM

## 2017-06-30 DIAGNOSIS — E87.5 HYPERKALEMIA: ICD-10-CM

## 2017-06-30 DIAGNOSIS — E87.2 ACIDOSIS: ICD-10-CM

## 2017-07-06 LAB
ADJUSTED MITOGEN: 2.4 IU/ML
ADJUSTED TB AG: 0.08 IU/ML
ALBUMIN SERPL ELPH-MCNC: 4 G/DL
ANION GAP SERPL CALC-SCNC: 18 MMOL/L
APPEARANCE: CLEAR
BACTERIA: NEGATIVE
BASOPHILS # BLD AUTO: 0.09 K/UL
BASOPHILS NFR BLD AUTO: 0.6 %
BILIRUBIN URINE: NEGATIVE
BLOOD URINE: ABNORMAL
BUN SERPL-MCNC: 41 MG/DL
CALCIUM SERPL-MCNC: 8.9 MG/DL
CHLORIDE SERPL-SCNC: 106 MMOL/L
CO2 SERPL-SCNC: 20 MMOL/L
COLOR: YELLOW
CREAT SERPL-MCNC: 3.99 MG/DL
CREAT SPEC-SCNC: 44 MG/DL
CREAT/PROT UR: 3.6 RATIO
EOSINOPHIL # BLD AUTO: 0.38 K/UL
EOSINOPHIL NFR BLD AUTO: 2.3 %
GLUCOSE QUALITATIVE U: NORMAL MG/DL
GLUCOSE SERPL-MCNC: 102 MG/DL
HBV CORE IGG+IGM SER QL: NONREACTIVE
HBV CORE IGM SER QL: NONREACTIVE
HBV SURFACE AB SER QL: NONREACTIVE
HBV SURFACE AB SERPL IA-ACNC: <3 MIU/ML
HBV SURFACE AG SER QL: NONREACTIVE
HCT VFR BLD CALC: 42.2 %
HCV AB SER QL: NONREACTIVE
HCV S/CO RATIO: 0.19 S/CO
HGB BLD-MCNC: 12.8 G/DL
HYALINE CASTS: 2 /LPF
IMM GRANULOCYTES NFR BLD AUTO: 0.6 %
KETONES URINE: NEGATIVE
LEUKOCYTE ESTERASE URINE: NEGATIVE
LYMPHOCYTES # BLD AUTO: 1.49 K/UL
LYMPHOCYTES NFR BLD AUTO: 9.2 %
M TB IFN-G BLD-IMP: NEGATIVE
MAN DIFF?: NORMAL
MCHC RBC-ENTMCNC: 22.7 PG
MCHC RBC-ENTMCNC: 30.3 GM/DL
MCV RBC AUTO: 75 FL
MICROSCOPIC-UA: NORMAL
MONOCYTES # BLD AUTO: 0.56 K/UL
MONOCYTES NFR BLD AUTO: 3.4 %
NEUTROPHILS # BLD AUTO: 13.64 K/UL
NEUTROPHILS NFR BLD AUTO: 83.9 %
NITRITE URINE: NEGATIVE
PH URINE: 6
PHOSPHATE SERPL-MCNC: 4.7 MG/DL
PLATELET # BLD AUTO: 486 K/UL
POTASSIUM SERPL-SCNC: 5.3 MMOL/L
PROT UR-MCNC: 158 MG/DL
PROTEIN URINE: 100 MG/DL
QUANTIFERON GOLD NIL: 0.03 IU/ML
RBC # BLD: 5.63 M/UL
RBC # FLD: 20.2 %
RED BLOOD CELLS URINE: 2 /HPF
SODIUM SERPL-SCNC: 144 MMOL/L
SPECIFIC GRAVITY URINE: 1.01
SQUAMOUS EPITHELIAL CELLS: 1 /HPF
UROBILINOGEN URINE: NORMAL MG/DL
WBC # FLD AUTO: 16.26 K/UL
WHITE BLOOD CELLS URINE: 4 /HPF

## 2017-07-27 ENCOUNTER — OUTPATIENT (OUTPATIENT)
Dept: OUTPATIENT SERVICES | Facility: HOSPITAL | Age: 57
LOS: 1 days | End: 2017-07-27
Payer: SELF-PAY

## 2017-07-27 ENCOUNTER — APPOINTMENT (OUTPATIENT)
Dept: INTERNAL MEDICINE | Facility: CLINIC | Age: 57
End: 2017-07-27

## 2017-07-27 ENCOUNTER — MED ADMIN CHARGE (OUTPATIENT)
Age: 57
End: 2017-07-27

## 2017-07-27 VITALS
OXYGEN SATURATION: 98 % | HEART RATE: 82 BPM | HEIGHT: 60 IN | BODY MASS INDEX: 23.95 KG/M2 | WEIGHT: 122 LBS | DIASTOLIC BLOOD PRESSURE: 80 MMHG | SYSTOLIC BLOOD PRESSURE: 130 MMHG

## 2017-07-27 DIAGNOSIS — I10 ESSENTIAL (PRIMARY) HYPERTENSION: ICD-10-CM

## 2017-07-27 PROCEDURE — G0463: CPT

## 2017-07-27 PROCEDURE — G0010: CPT

## 2017-07-27 PROCEDURE — 90746 HEPB VACCINE 3 DOSE ADULT IM: CPT

## 2017-07-27 PROCEDURE — G0009: CPT

## 2017-07-27 PROCEDURE — 90670 PCV13 VACCINE IM: CPT

## 2017-08-01 DIAGNOSIS — N18.5 CHRONIC KIDNEY DISEASE, STAGE 5: ICD-10-CM

## 2017-08-01 DIAGNOSIS — R21 RASH AND OTHER NONSPECIFIC SKIN ERUPTION: ICD-10-CM

## 2017-08-01 DIAGNOSIS — Z23 ENCOUNTER FOR IMMUNIZATION: ICD-10-CM

## 2017-08-01 DIAGNOSIS — E87.2 ACIDOSIS: ICD-10-CM

## 2017-08-07 ENCOUNTER — INPATIENT (INPATIENT)
Facility: HOSPITAL | Age: 57
LOS: 2 days | Discharge: HOME | End: 2017-08-10
Attending: SURGERY | Admitting: SURGERY
Payer: MEDICAID

## 2017-08-07 VITALS
HEART RATE: 79 BPM | OXYGEN SATURATION: 100 % | SYSTOLIC BLOOD PRESSURE: 147 MMHG | RESPIRATION RATE: 16 BRPM | DIASTOLIC BLOOD PRESSURE: 77 MMHG | TEMPERATURE: 99 F

## 2017-08-07 DIAGNOSIS — I10 ESSENTIAL (PRIMARY) HYPERTENSION: ICD-10-CM

## 2017-08-07 DIAGNOSIS — N19 UNSPECIFIED KIDNEY FAILURE: ICD-10-CM

## 2017-08-07 DIAGNOSIS — N18.6 END STAGE RENAL DISEASE: ICD-10-CM

## 2017-08-07 DIAGNOSIS — E87.2 ACIDOSIS: ICD-10-CM

## 2017-08-07 LAB
ALBUMIN SERPL ELPH-MCNC: 3.9 G/DL — SIGNIFICANT CHANGE UP (ref 3.3–5)
ALP SERPL-CCNC: 193 U/L — HIGH (ref 40–120)
ALT FLD-CCNC: 11 U/L — SIGNIFICANT CHANGE UP (ref 4–33)
ANISOCYTOSIS BLD QL: SLIGHT — SIGNIFICANT CHANGE UP
APTT BLD: 49.1 SEC — HIGH (ref 27.5–37.4)
AST SERPL-CCNC: 27 U/L — SIGNIFICANT CHANGE UP (ref 4–32)
BASE EXCESS BLDV CALC-SCNC: -8.9 MMOL/L — SIGNIFICANT CHANGE UP
BASOPHILS # BLD AUTO: 0.1 K/UL — SIGNIFICANT CHANGE UP (ref 0–0.2)
BASOPHILS NFR BLD AUTO: 0.6 % — SIGNIFICANT CHANGE UP (ref 0–2)
BILIRUB SERPL-MCNC: 1.3 MG/DL — HIGH (ref 0.2–1.2)
BLD GP AB SCN SERPL QL: NEGATIVE — SIGNIFICANT CHANGE UP
BLOOD GAS VENOUS - CREATININE: 4.48 MG/DL — HIGH (ref 0.5–1.3)
BUN SERPL-MCNC: 48 MG/DL — HIGH (ref 7–23)
BURR CELLS BLD QL SMEAR: PRESENT — SIGNIFICANT CHANGE UP
CALCIUM SERPL-MCNC: 7.7 MG/DL — LOW (ref 8.4–10.5)
CHLORIDE BLDV-SCNC: 113 MMOL/L — HIGH (ref 96–108)
CHLORIDE SERPL-SCNC: 107 MMOL/L — SIGNIFICANT CHANGE UP (ref 98–107)
CO2 SERPL-SCNC: 16 MMOL/L — LOW (ref 22–31)
CREAT SERPL-MCNC: 4.47 MG/DL — HIGH (ref 0.5–1.3)
EOSINOPHIL # BLD AUTO: 0.38 K/UL — SIGNIFICANT CHANGE UP (ref 0–0.5)
EOSINOPHIL NFR BLD AUTO: 2.3 % — SIGNIFICANT CHANGE UP (ref 0–6)
GAS PNL BLDV: 138 MMOL/L — SIGNIFICANT CHANGE UP (ref 136–146)
GLUCOSE BLDV-MCNC: 154 — HIGH (ref 70–99)
GLUCOSE SERPL-MCNC: 145 MG/DL — HIGH (ref 70–99)
HCO3 BLDV-SCNC: 17 MMOL/L — LOW (ref 20–27)
HCT VFR BLD CALC: 42.3 % — SIGNIFICANT CHANGE UP (ref 34.5–45)
HCT VFR BLDV CALC: 38.6 % — SIGNIFICANT CHANGE UP (ref 34.5–45)
HGB BLD-MCNC: 12.3 G/DL — SIGNIFICANT CHANGE UP (ref 11.5–15.5)
HGB BLDV-MCNC: 12.5 G/DL — SIGNIFICANT CHANGE UP (ref 11.5–15.5)
IMM GRANULOCYTES # BLD AUTO: 0.12 # — SIGNIFICANT CHANGE UP
IMM GRANULOCYTES NFR BLD AUTO: 0.7 % — SIGNIFICANT CHANGE UP (ref 0–1.5)
INR BLD: 1.08 — SIGNIFICANT CHANGE UP (ref 0.88–1.17)
LACTATE BLDV-MCNC: 2.2 MMOL/L — HIGH (ref 0.5–2)
LG PLATELETS BLD QL AUTO: SLIGHT — SIGNIFICANT CHANGE UP
LYMPHOCYTES # BLD AUTO: 1.45 K/UL — SIGNIFICANT CHANGE UP (ref 1–3.3)
LYMPHOCYTES # BLD AUTO: 8.8 % — LOW (ref 13–44)
MANUAL SMEAR VERIFICATION: SIGNIFICANT CHANGE UP
MCHC RBC-ENTMCNC: 21.7 PG — LOW (ref 27–34)
MCHC RBC-ENTMCNC: 29.1 % — LOW (ref 32–36)
MCV RBC AUTO: 74.5 FL — LOW (ref 80–100)
MICROCYTES BLD QL: SLIGHT — SIGNIFICANT CHANGE UP
MONOCYTES # BLD AUTO: 0.47 K/UL — SIGNIFICANT CHANGE UP (ref 0–0.9)
MONOCYTES NFR BLD AUTO: 2.9 % — SIGNIFICANT CHANGE UP (ref 2–14)
NEUTROPHILS # BLD AUTO: 13.9 K/UL — HIGH (ref 1.8–7.4)
NEUTROPHILS NFR BLD AUTO: 84.7 % — HIGH (ref 43–77)
NRBC # FLD: 0 — SIGNIFICANT CHANGE UP
OVALOCYTES BLD QL SMEAR: SLIGHT — SIGNIFICANT CHANGE UP
PCO2 BLDV: 38 MMHG — LOW (ref 41–51)
PH BLDV: 7.27 PH — LOW (ref 7.32–7.43)
PLATELET # BLD AUTO: 433 K/UL — HIGH (ref 150–400)
PLATELET COUNT - ESTIMATE: NORMAL — SIGNIFICANT CHANGE UP
PMV BLD: 9.6 FL — SIGNIFICANT CHANGE UP (ref 7–13)
PO2 BLDV: 67 MMHG — HIGH (ref 35–40)
POLYCHROMASIA BLD QL SMEAR: SLIGHT — SIGNIFICANT CHANGE UP
POTASSIUM BLDV-SCNC: 4.8 MMOL/L — HIGH (ref 3.4–4.5)
POTASSIUM SERPL-MCNC: 5.3 MMOL/L — SIGNIFICANT CHANGE UP (ref 3.5–5.3)
POTASSIUM SERPL-SCNC: 5.3 MMOL/L — SIGNIFICANT CHANGE UP (ref 3.5–5.3)
PROT SERPL-MCNC: 7.7 G/DL — SIGNIFICANT CHANGE UP (ref 6–8.3)
PROTHROM AB SERPL-ACNC: 12.1 SEC — SIGNIFICANT CHANGE UP (ref 9.8–13.1)
RBC # BLD: 5.68 M/UL — HIGH (ref 3.8–5.2)
RBC # FLD: 19.9 % — HIGH (ref 10.3–14.5)
RH IG SCN BLD-IMP: POSITIVE — SIGNIFICANT CHANGE UP
SAO2 % BLDV: 89.8 % — HIGH (ref 60–85)
SODIUM SERPL-SCNC: 138 MMOL/L — SIGNIFICANT CHANGE UP (ref 135–145)
WBC # BLD: 16.42 K/UL — HIGH (ref 3.8–10.5)
WBC # FLD AUTO: 16.42 K/UL — HIGH (ref 3.8–10.5)

## 2017-08-07 PROCEDURE — 93010 ELECTROCARDIOGRAM REPORT: CPT

## 2017-08-07 PROCEDURE — 99222 1ST HOSP IP/OBS MODERATE 55: CPT | Mod: 57,GC

## 2017-08-07 RX ORDER — SODIUM CHLORIDE 9 MG/ML
1000 INJECTION INTRAMUSCULAR; INTRAVENOUS; SUBCUTANEOUS
Qty: 0 | Refills: 0 | Status: DISCONTINUED | OUTPATIENT
Start: 2017-08-07 | End: 2017-08-10

## 2017-08-07 RX ORDER — HEPARIN SODIUM 5000 [USP'U]/ML
5000 INJECTION INTRAVENOUS; SUBCUTANEOUS EVERY 8 HOURS
Qty: 0 | Refills: 0 | Status: DISCONTINUED | OUTPATIENT
Start: 2017-08-07 | End: 2017-08-10

## 2017-08-07 RX ORDER — ERGOCALCIFEROL 1.25 MG/1
1 CAPSULE ORAL
Qty: 0 | Refills: 0 | COMMUNITY

## 2017-08-07 NOTE — CONSULT NOTE ADULT - SUBJECTIVE AND OBJECTIVE BOX
[facility] Division of Kidney Diseases & Hypertension  INITIAL CONSULT NOTE  [phone]--------------------------------------------------------------------------------  HPI:    57 year old female with h/o HTN, polycythemia vera came due to worsening renal function. Patient was diagnosed with CKD 1 year ago and has been following with Dr. De León. As per last office note, patient will be starting on PD and was sent to [facility] for placement of peritoneal catheter. Currently she has no complains of SOB, CP, abdominal pain, nausea, vomiting, headache.     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 1000 milliLiter(s) IV Continuous <Continuous>    PRN Inpatient Medications      REVIEW OF SYSTEMS  --------------------------------------------------------------------------------  Gen: No  fevers/chills, weakness  Skin: No rashes  Head/Eyes/Ears/Mouth: No headache; Normal hearing; Normal vision w/o blurriness;   Respiratory: No dyspnea, cough, wheezing, hemoptysis  CV: No chest pain,   GI: No abdominal pain, diarrhea, constipation, nausea, vomiting  : No increased frequency, dysuria, hematuria, nocturia  MSK: No joint pain/swelling;   Neuro: No dizziness/lightheadedness, weakness, seizures    All other systems were reviewed and are negative, except as noted.    VITALS/PHYSICAL EXAM  --------------------------------------------------------------------------------  T(C): 37 (08-07-17 @ 12:13), Max: 37 (08-07-17 @ 12:13)  HR: 76 (08-07-17 @ 16:54) (76 - 82)  BP: 145/84 (08-07-17 @ 16:54) (145/84 - 151/85)  RR: 16 (08-07-17 @ 16:54) (16 - 16)  SpO2: 100% (08-07-17 @ 16:54) (100% - 100%)  Wt(kg): --        Physical Exam:  	Gen: NAD, well-appearing  	HEENT: PERRL, supple neck  	Pulm: CTA B/L  	CV:  S1S2  	Abd: +BS, soft   	Ext: No B/L Lower ext edema    LABS/STUDIES  --------------------------------------------------------------------------------              12.3   16.42 >-----------<  433      [08-07-17 @ 14:01]              42.3     138  |  107  |  48  ----------------------------<  145      [08-07-17 @ 14:01]  5.3   |  16  |  4.47        Ca     7.7     [08-07-17 @ 14:01]    TPro  7.7  /  Alb  3.9  /  TBili  1.3  /  DBili  x   /  AST  27  /  ALT  11  /  AlkPhos  193  [08-07-17 @ 14:01]    PT/INR: PT 12.1 , INR 1.08       [08-07-17 @ 16:00]  PTT: 49.1       [08-07-17 @ 16:00]      Creatinine Trend:  SCr 4.47 [08-07 @ 14:01] [facility] Division of Kidney Diseases & Hypertension  INITIAL CONSULT NOTE  [phone]--------------------------------------------------------------------------------  HPI:    57 year old female with h/o HTN, polycythemia vera came due to worsening renal function. Patient was diagnosed with CKD one year ago and has been following with Dr. De León. As per last office note, patient will be starting on PD and was sent to [facility] for placement of peritoneal catheter. Currently she has no complains of SOB, CP, abdominal pain, nausea, vomiting, headache.     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 1000 milliLiter(s) IV Continuous <Continuous>    PRN Inpatient Medications      REVIEW OF SYSTEMS  --------------------------------------------------------------------------------  Gen: No  fevers/chills, weakness  Skin: No rashes  Head/Eyes/Ears/Mouth: No headache; Normal hearing; Normal vision w/o blurriness;   Respiratory: No dyspnea, cough, wheezing, hemoptysis  CV: No chest pain,   GI: No abdominal pain, diarrhea, constipation, nausea, vomiting  : No increased frequency, dysuria, hematuria, nocturia  MSK: No joint pain/swelling;   Neuro: No dizziness/lightheadedness, weakness, seizures    All other systems were reviewed and are negative, except as noted.    VITALS/PHYSICAL EXAM  --------------------------------------------------------------------------------  T(C): 37 (08-07-17 @ 12:13), Max: 37 (08-07-17 @ 12:13)  HR: 76 (08-07-17 @ 16:54) (76 - 82)  BP: 145/84 (08-07-17 @ 16:54) (145/84 - 151/85)  RR: 16 (08-07-17 @ 16:54) (16 - 16)  SpO2: 100% (08-07-17 @ 16:54) (100% - 100%)  Wt(kg): --        Physical Exam:  	Gen: NAD, well-appearing  	HEENT: PERRL, supple neck  	Pulm: CTA B/L  	CV:  S1S2  	Abd: +BS, soft   	Ext: No B/L Lower ext edema    LABS/STUDIES  --------------------------------------------------------------------------------              12.3   16.42 >-----------<  433      [08-07-17 @ 14:01]              42.3     138  |  107  |  48  ----------------------------<  145      [08-07-17 @ 14:01]  5.3   |  16  |  4.47        Ca     7.7     [08-07-17 @ 14:01]    TPro  7.7  /  Alb  3.9  /  TBili  1.3  /  DBili  x   /  AST  27  /  ALT  11  /  AlkPhos  193  [08-07-17 @ 14:01]    PT/INR: PT 12.1 , INR 1.08       [08-07-17 @ 16:00]  PTT: 49.1       [08-07-17 @ 16:00]      Creatinine Trend:  SCr 4.47 [08-07 @ 14:01]

## 2017-08-07 NOTE — ED ADULT NURSE NOTE - OBJECTIVE STATEMENT
Pt reporting new dx of CKD unsure of stage received to rm 13 aaox4 ambulatory sent by pmd for evaluation to begin peritoneal dialysis.  Pt reports new swelling to L. leg and high creatinine at PMD office.  Pt denies pain, NVD fevers chills SOB or difficulty breathing.  Pt p/ NAD breaths even unlabored vitals stable skin warm dry intact  22 g IV access obtained at L. AC labs as ordered will endorse report to primary RN Tristen.

## 2017-08-07 NOTE — ED PROVIDER NOTE - MUSCULOSKELETAL, MLM
Spine appears normal, range of motion is not limited, no muscle or joint tenderness  trace- 1+ pitting to LEs  symmetrcal

## 2017-08-07 NOTE — H&P ADULT - NSHPLABSRESULTS_GEN_ALL_CORE
CBC (08-07 @ 14:01)                          12.3                     16.42<H>  )--------------(  433<H>     84.7<H>% Neuts, 8.8<L>% Lymphs, ANC: 13.90<H>                          42.3      BMP (08-07 @ 14:01)       138     |  107     |  48<H> 			Ca++ --      Ca 7.7<L>       ---------------------------------( 145<H>		Mg --           5.3     |  16<L>   |  4.47<H>			Ph --        LFTs (08-07 @ 14:01)      TPro 7.7 / Alb 3.9 / TBili 1.3<H> / DBili -- / AST 27 / ALT 11 / AlkPhos 193<H>    Coags (08-07 @ 16:00)  aPTT 49.1<H> / INR 1.08 / PT 12.1        VBG (08-07 @ 14:01)     7.27<L> / 38<L> / 67<H> / 17<L> / -8.9 / 89.8<H>%      Lactate: 2.2<H>

## 2017-08-07 NOTE — PROGRESS NOTE ADULT - SUBJECTIVE AND OBJECTIVE BOX
PRE OPERATIVE NOTE    Pre-op Diagnosis: ESRD  Procedure: Laparoscopic insertion of peritoneal dialysis catheter  Surgeon: Dr. Anaya                              12.3   16.42 )-----------( 433      ( 07 Aug 2017 14:01 )             42.3     08-07    138  |  107  |  48<H>  ----------------------------<  145<H>  5.3   |  16<L>  |  4.47<H>    Ca    7.7<L>      07 Aug 2017 14:01    TPro  7.7  /  Alb  3.9  /  TBili  1.3<H>  /  DBili  x   /  AST  27  /  ALT  11  /  AlkPhos  193<H>  08-07    LIVER FUNCTIONS - ( 07 Aug 2017 14:01 )  Alb: 3.9 g/dL / Pro: 7.7 g/dL / ALK PHOS: 193 u/L / ALT: 11 u/L / AST: 27 u/L / GGT: x           PT/INR - ( 07 Aug 2017 16:00 )   PT: 12.1 SEC;   INR: 1.08          PTT - ( 07 Aug 2017 16:00 )  PTT:49.1 SEC    CAPILLARY BLOOD GLUCOSE          Type & Screen:   complete  CXR:    complete  EKG:   complete  Consent: in chart, signed      A/P: 57y Female planned for above procedure  NPO past midnight, except medications  IVF  Pain/nausea control  Blood on hold, 2 Units  AM labs  Consent in chart

## 2017-08-07 NOTE — ED PROVIDER NOTE - OBJECTIVE STATEMENT
pt sent to ED for admission for worsening renal function  pt notes recent LE edema  denies SOB  CP or vomiting  no change in medications  Pt with h/o P vera  has been taken of  hydroxyurea

## 2017-08-07 NOTE — H&P ADULT - HISTORY OF PRESENT ILLNESS
57 year old woman with end stage renal disease presents today for placement of a peritoneal dialysis catheter.  Her nephrologist told her to come to the ED today as she would be needing dialysis.  She feels well and has no complaints.  She has never had abdominal surgeries.  She has been tolerating oral intake without issue. No fevers or chills.  She makes urine daily.  She has not yet undergone dialysis.    MedHx: Chronic kidney disease V, polycythemia vera, HTN  SurgHx: none  All: NKDA  SocHx: lives with family; nonsmoker, no drug or etoh abuse  FamHx: nasopharyngeal carcinoma - father    Review of Systems:  General: No weight changes, no fevers or chills, no weakness  Neurologic: No numbness or weakness, no facial drooping  Cardiovascular: No chest pain or shortness of breath, intermittent extremity edema recently  Pulmonary: No cough or hemoptysis, no wheezing  Abdominal: No abdominal pain, no hematochezia, no changes in bowel habits  Genitourinary: No hematuria, no dysuria  Skin: macular rash on lower extremities, waxing and waning        Vital Signs Last 24 Hrs  T(C): 36.8 (07 Aug 2017 23:00), Max: 37 (07 Aug 2017 12:13)  T(F): 98.3 (07 Aug 2017 23:00), Max: 98.6 (07 Aug 2017 12:13)  HR: 89 (07 Aug 2017 23:00) (76 - 89)  BP: 140/77 (07 Aug 2017 23:00) (140/77 - 151/85)  RR: 18 (07 Aug 2017 23:00) (16 - 18)  SpO2: 98% (07 Aug 2017 23:00) (98% - 100%)    Physical Exam  General: alert, no distress  Psychiatric: affect appropriate  Neurologic: No focal neurologic deficits  Cardiovascular: Regular rate and rhythm  Pulmonary: Breathing unlabored  Abdominal: Soft, nondistended, nontender  Extremities: Moves all extremities, warm, no edema  Vascular: Palpable bilataral radial,pulses, motor and sensation intact in all extremities  Skin: Warm and dry

## 2017-08-07 NOTE — ED PROVIDER NOTE - ATTENDING CONTRIBUTION TO CARE
[place]  pt sent here for worsening renal function  increase LE edema  no uremic sxs [place]  pt sent here for worsening renal function  increase LE edema  no uremic sxs  plan to admit for dialysis catheter

## 2017-08-07 NOTE — CONSULT NOTE ADULT - PROBLEM SELECTOR RECOMMENDATION 9
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 Will need surgery consult for PD catheter placement. Patient currently does not appear to be in fluid overload or uremic. Electrolytes are under acceptable limits. Does not need urgent dialysis at this time. Monitor renal function and BP.

## 2017-08-07 NOTE — CONSULT NOTE ADULT - ATTENDING COMMENTS
57 female with ESRD, admitted for PD catheter insertion.    Will follow up after catheter inserted to organize PD catheter lavageprior to dc home.

## 2017-08-07 NOTE — H&P ADULT - PROBLEM SELECTOR PLAN 1
- NPO at midnight  - AM labs, CXR and EKG  - Booked and consented for OR tomorrow for laparoscopic insertion of peritoneal dialysis catheter  - Discussed with Dr. Anaya

## 2017-08-08 DIAGNOSIS — N19 UNSPECIFIED KIDNEY FAILURE: ICD-10-CM

## 2017-08-08 LAB
ALBUMIN SERPL ELPH-MCNC: 3.4 G/DL — SIGNIFICANT CHANGE UP (ref 3.3–5)
ALP SERPL-CCNC: 173 U/L — HIGH (ref 40–120)
ALT FLD-CCNC: 6 U/L — SIGNIFICANT CHANGE UP (ref 4–33)
APTT BLD: 39.5 SEC — HIGH (ref 27.5–37.4)
AST SERPL-CCNC: 11 U/L — SIGNIFICANT CHANGE UP (ref 4–32)
BILIRUB DIRECT SERPL-MCNC: 0.3 MG/DL — HIGH (ref 0.1–0.2)
BILIRUB SERPL-MCNC: 1.1 MG/DL — SIGNIFICANT CHANGE UP (ref 0.2–1.2)
BUN SERPL-MCNC: 47 MG/DL — HIGH (ref 7–23)
CALCIUM SERPL-MCNC: 8.1 MG/DL — LOW (ref 8.4–10.5)
CHLORIDE SERPL-SCNC: 110 MMOL/L — HIGH (ref 98–107)
CO2 SERPL-SCNC: 19 MMOL/L — LOW (ref 22–31)
CREAT SERPL-MCNC: 4.54 MG/DL — HIGH (ref 0.5–1.3)
GLUCOSE SERPL-MCNC: 84 MG/DL — SIGNIFICANT CHANGE UP (ref 70–99)
HCG SERPL-ACNC: < 5 MIU/ML — SIGNIFICANT CHANGE UP
HCT VFR BLD CALC: 42.3 % — SIGNIFICANT CHANGE UP (ref 34.5–45)
HGB BLD-MCNC: 12.5 G/DL — SIGNIFICANT CHANGE UP (ref 11.5–15.5)
INR BLD: 1.18 — HIGH (ref 0.88–1.17)
MAGNESIUM SERPL-MCNC: 1.9 MG/DL — SIGNIFICANT CHANGE UP (ref 1.6–2.6)
MCHC RBC-ENTMCNC: 21.9 PG — LOW (ref 27–34)
MCHC RBC-ENTMCNC: 29.6 % — LOW (ref 32–36)
MCV RBC AUTO: 74.2 FL — LOW (ref 80–100)
NRBC # FLD: 0 — SIGNIFICANT CHANGE UP
PHOSPHATE SERPL-MCNC: 5.2 MG/DL — HIGH (ref 2.5–4.5)
PLATELET # BLD AUTO: 411 K/UL — HIGH (ref 150–400)
PMV BLD: 9.1 FL — SIGNIFICANT CHANGE UP (ref 7–13)
POTASSIUM SERPL-MCNC: 5.3 MMOL/L — SIGNIFICANT CHANGE UP (ref 3.5–5.3)
POTASSIUM SERPL-SCNC: 5.3 MMOL/L — SIGNIFICANT CHANGE UP (ref 3.5–5.3)
PROT SERPL-MCNC: 6.9 G/DL — SIGNIFICANT CHANGE UP (ref 6–8.3)
PROTHROM AB SERPL-ACNC: 13.3 SEC — HIGH (ref 9.8–13.1)
RBC # BLD: 5.7 M/UL — HIGH (ref 3.8–5.2)
RBC # FLD: 19.9 % — HIGH (ref 10.3–14.5)
SODIUM SERPL-SCNC: 140 MMOL/L — SIGNIFICANT CHANGE UP (ref 135–145)
WBC # BLD: 15.83 K/UL — HIGH (ref 3.8–10.5)
WBC # FLD AUTO: 15.83 K/UL — HIGH (ref 3.8–10.5)

## 2017-08-08 PROCEDURE — 49324 LAP INSERT TUNNEL IP CATH: CPT | Mod: GC

## 2017-08-08 PROCEDURE — 71010: CPT | Mod: 26

## 2017-08-08 RX ORDER — OXYCODONE HYDROCHLORIDE 5 MG/1
1 TABLET ORAL
Qty: 15 | Refills: 0 | OUTPATIENT
Start: 2017-08-08

## 2017-08-08 RX ORDER — ONDANSETRON 8 MG/1
4 TABLET, FILM COATED ORAL ONCE
Qty: 0 | Refills: 0 | Status: DISCONTINUED | OUTPATIENT
Start: 2017-08-08 | End: 2017-08-09

## 2017-08-08 RX ORDER — OXYCODONE HYDROCHLORIDE 5 MG/1
5 TABLET ORAL EVERY 6 HOURS
Qty: 0 | Refills: 0 | Status: DISCONTINUED | OUTPATIENT
Start: 2017-08-08 | End: 2017-08-10

## 2017-08-08 RX ORDER — HYDROMORPHONE HYDROCHLORIDE 2 MG/ML
0.25 INJECTION INTRAMUSCULAR; INTRAVENOUS; SUBCUTANEOUS
Qty: 0 | Refills: 0 | Status: DISCONTINUED | OUTPATIENT
Start: 2017-08-08 | End: 2017-08-09

## 2017-08-08 RX ORDER — ACETAMINOPHEN 500 MG
650 TABLET ORAL EVERY 6 HOURS
Qty: 0 | Refills: 0 | Status: DISCONTINUED | OUTPATIENT
Start: 2017-08-08 | End: 2017-08-10

## 2017-08-08 RX ADMIN — HYDROMORPHONE HYDROCHLORIDE 0.25 MILLIGRAM(S): 2 INJECTION INTRAMUSCULAR; INTRAVENOUS; SUBCUTANEOUS at 20:37

## 2017-08-08 RX ADMIN — HYDROMORPHONE HYDROCHLORIDE 0.25 MILLIGRAM(S): 2 INJECTION INTRAMUSCULAR; INTRAVENOUS; SUBCUTANEOUS at 20:03

## 2017-08-08 RX ADMIN — HEPARIN SODIUM 5000 UNIT(S): 5000 INJECTION INTRAVENOUS; SUBCUTANEOUS at 23:19

## 2017-08-08 RX ADMIN — SODIUM CHLORIDE 30 MILLILITER(S): 9 INJECTION INTRAMUSCULAR; INTRAVENOUS; SUBCUTANEOUS at 00:34

## 2017-08-08 RX ADMIN — HYDROMORPHONE HYDROCHLORIDE 0.25 MILLIGRAM(S): 2 INJECTION INTRAMUSCULAR; INTRAVENOUS; SUBCUTANEOUS at 20:36

## 2017-08-08 RX ADMIN — OXYCODONE HYDROCHLORIDE 5 MILLIGRAM(S): 5 TABLET ORAL at 23:19

## 2017-08-08 RX ADMIN — HYDROMORPHONE HYDROCHLORIDE 0.25 MILLIGRAM(S): 2 INJECTION INTRAMUSCULAR; INTRAVENOUS; SUBCUTANEOUS at 20:25

## 2017-08-08 RX ADMIN — HYDROMORPHONE HYDROCHLORIDE 0.25 MILLIGRAM(S): 2 INJECTION INTRAMUSCULAR; INTRAVENOUS; SUBCUTANEOUS at 20:30

## 2017-08-08 RX ADMIN — HYDROMORPHONE HYDROCHLORIDE 0.25 MILLIGRAM(S): 2 INJECTION INTRAMUSCULAR; INTRAVENOUS; SUBCUTANEOUS at 20:45

## 2017-08-08 RX ADMIN — SODIUM CHLORIDE 30 MILLILITER(S): 9 INJECTION INTRAMUSCULAR; INTRAVENOUS; SUBCUTANEOUS at 11:35

## 2017-08-08 NOTE — PROGRESS NOTE ADULT - SUBJECTIVE AND OBJECTIVE BOX
[facility] GENERAL SURGERY POST-OP NOTE    SUBJECTIVE: Pt underwent laparoscopic insertion of 63cm peritoneal dialysis catheter. Intra-operatively, 500cc of NS were instilled into the catheter with good return. The pt was transferred to PACU then floor in stable condition. Pt seen and evaluated at bedside. Resting comfortably in bed. Pain well controlled, however pt reports mild R shoulder pain. Denies nausea/vomiting, CP, palpitations, SOB, lightheaded, dizziness. Voiding.     Objective:  Gen: NAD, AAOx3  Pulm: b/l chest rise. No work on breathing  Card: RRR  Abd: soft, appropriately tender, ND. Dressings CDI. peritoneal dialysis catheter in place    Vital Signs Last 24 Hrs  T(C): 37.2 (08 Aug 2017 22:26), Max: 37.3 (08 Aug 2017 01:35)  T(F): 99 (08 Aug 2017 22:26), Max: 99.2 (08 Aug 2017 01:35)  HR: 76 (08 Aug 2017 22:26) (66 - 89)  BP: 135/80 (08 Aug 2017 22:26) (121/62 - 163/78)  BP(mean): --  RR: 17 (08 Aug 2017 22:26) (12 - 20)  SpO2: 97% (08 Aug 2017 22:26) (95% - 99%)  I&O's Summary    07 Aug 2017 07:01  -  08 Aug 2017 07:00  --------------------------------------------------------  IN: 241 mL / OUT: 0 mL / NET: 241 mL      I&O's Detail    07 Aug 2017 07:01  -  08 Aug 2017 07:00  --------------------------------------------------------  IN:    IV PiggyBack: 1 mL    sodium chloride 0.9%.: 240 mL  Total IN: 241 mL    OUT:  Total OUT: 0 mL    Total NET: 241 mL          MEDICATIONS  (STANDING):  heparin  Injectable 5000 Unit(s) SubCutaneous every 8 hours  sodium chloride 0.9%. 1000 milliLiter(s) (30 mL/Hr) IV Continuous <Continuous>    MEDICATIONS  (PRN):  HYDROmorphone  Injectable 0.25 milliGRAM(s) IV Push every 10 minutes PRN Severe Pain (7 - 10)  ondansetron Injectable 4 milliGRAM(s) IV Push once PRN Nausea and/or Vomiting  acetaminophen   Tablet. 650 milliGRAM(s) Oral every 6 hours PRN Mild Pain (1 - 3)  oxyCODONE    IR 5 milliGRAM(s) Oral every 6 hours PRN Moderate Pain (4 - 6)      LABS:                        12.5   15.83 )-----------( 411      ( 08 Aug 2017 12:08 )             42.3     08-08    140  |  110<H>  |  47<H>  ----------------------------<  84  5.3   |  19<L>  |  4.54<H>    Ca    8.1<L>      08 Aug 2017 12:08  Phos  5.2     08-08  Mg     1.9     08-08    TPro  6.9  /  Alb  3.4  /  TBili  1.1  /  DBili  0.3<H>  /  AST  11  /  ALT  6   /  AlkPhos  173<H>  08-08    PT/INR - ( 08 Aug 2017 05:45 )   PT: 13.3 SEC;   INR: 1.18          PTT - ( 08 Aug 2017 05:45 )  PTT:39.5 SEC      RADIOLOGY & ADDITIONAL STUDIES:      A/P: 57F with ESRD s/p laparoscopic peritoneal dialysis catheter insertion. Pt hemodynamically stable and doing well. Pt having R shoulder pain likely 2/2 gas insuflation from laparoscopic surgery.    - Pain control - continue current regimen  - Strict I/O's  - F/U GI fxn  - OOB/DVT ppx - SQH  - f/u AM labs  - Diet: LRD  - dispo: possible discharge tomorrow morning pending pain control

## 2017-08-08 NOTE — PROGRESS NOTE ADULT - SUBJECTIVE AND OBJECTIVE BOX
[facility] Division of Kidney Diseases & Hypertension  FOLLOW UP NOTE  [phone]--------------------------------------------------------------------------------  Chief Complaint:Renal failure      24 hour events/subjective:    Patient seen and evaluated today. Currently has no complaints of SOB, CP, abdominal pain, nausea, vomiting. She is scheduled for PD catheter placement today.     PAST HISTORY  --------------------------------------------------------------------------------  No significant changes to PMH, PSH, FHx, SHx, unless otherwise noted    ALLERGIES & MEDICATIONS  --------------------------------------------------------------------------------  Allergies    No Known Allergies    Intolerances      Standing Inpatient Medications  heparin  Injectable 5000 Unit(s) SubCutaneous every 8 hours  sodium chloride 0.9%. 1000 milliLiter(s) IV Continuous <Continuous>    PRN Inpatient Medications      REVIEW OF SYSTEMS  --------------------------------------------------------------------------------  Gen: No  fevers/chills  Skin: No rashes  Head/Eyes/Ears/Mouth: No headache; Normal hearing; Normal vision w/o blurriness  Respiratory: No dyspnea, cough, wheezing, hemoptysis  CV: No chest pain, PND, orthopnea  GI: No abdominal pain, diarrhea, constipation, nausea, vomiting  : No increased frequency, dysuria, hematuria, nocturia  MSK: No joint pain/swelling; no back pain; no edema  Neuro: No dizziness/lightheadedness, weakness, seizures, numbness, tingling      All other systems were reviewed and are negative, except as noted.    VITALS/PHYSICAL EXAM  --------------------------------------------------------------------------------  T(C): 36.6 (08-08-17 @ 10:12), Max: 37.3 (08-08-17 @ 01:35)  HR: 71 (08-08-17 @ 10:12) (70 - 89)  BP: 121/62 (08-08-17 @ 10:12) (121/62 - 151/85)  RR: 20 (08-08-17 @ 10:12) (16 - 20)  SpO2: 98% (08-08-17 @ 10:12) (96% - 100%)  Wt(kg): --        08-07-17 @ 07:01  -  08-08-17 @ 07:00  --------------------------------------------------------  IN: 241 mL / OUT: 0 mL / NET: 241 mL      Physical Exam:  	Gen: NAD, well-appearing  	Pulm: CTA B/L  	CV: RRR, S1S2;  	Abd: +BS, soft, nontender/nondistended              Extremities: no bilateral LE edema noted.     LABS/STUDIES  --------------------------------------------------------------------------------              12.3   16.42 >-----------<  433      [08-07-17 @ 14:01]              42.3     138  |  107  |  48  ----------------------------<  145      [08-07-17 @ 14:01]  5.3   |  16  |  4.47        Ca     7.7     [08-07-17 @ 14:01]    TPro  6.9  /  Alb  3.4  /  TBili  1.1  /  DBili  0.3  /  AST  11  /  ALT  6   /  AlkPhos  173  [08-08-17 @ 05:45]    PT/INR: PT 13.3 , INR 1.18       [08-08-17 @ 05:45]  PTT: 39.5       [08-08-17 @ 05:45]      Creatinine Trend:  SCr 4.47 [08-07 @ 14:01]

## 2017-08-08 NOTE — PROGRESS NOTE ADULT - SUBJECTIVE AND OBJECTIVE BOX
A TEAM SURGERY DAILY PROGRESS NOTE:     Hospital Day:2    Overnight Events: No acute events overnight. Patient seen and examined at bedside resting comfortably. Patient admitted for planned PD catheter insertion today.       Physical Exam  Vital Signs Last 24 Hrs  T(C): 36.6 (08 Aug 2017 10:12), Max: 37.3 (08 Aug 2017 01:35)  T(F): 97.9 (08 Aug 2017 10:12), Max: 99.2 (08 Aug 2017 01:35)  HR: 71 (08 Aug 2017 10:12) (70 - 89)  BP: 121/62 (08 Aug 2017 10:12) (121/62 - 151/85)  BP(mean): --  RR: 20 (08 Aug 2017 10:12) (16 - 20)  SpO2: 98% (08 Aug 2017 10:12) (96% - 100%)  Gen: NAD  HEENT: normocephalic, atraumatic, no scleral icterus  CV: S1, S2, RRR  Pulm: CTA B/L  Abd: Soft, ND, NTP, no rebound, no guarding, no palpable organomegaly/masses  Ext: warm, no edema, palp dp/pt  I&O's Detail    07 Aug 2017 07:01  -  08 Aug 2017 07:00  --------------------------------------------------------  IN:    IV PiggyBack: 1 mL    sodium chloride 0.9%.: 240 mL  Total IN: 241 mL    OUT:  Total OUT: 0 mL    Total NET: 241 mL          Labs:                        12.5   15.83 )-----------( 411      ( 08 Aug 2017 12:08 )             42.3     08-08    140  |  110<H>  |  47<H>  ----------------------------<  84  5.3   |  19<L>  |  4.54<H>    Ca    8.1<L>      08 Aug 2017 12:08  Phos  5.2     08-08  Mg     1.9     08-08    TPro  6.9  /  Alb  3.4  /  TBili  1.1  /  DBili  0.3<H>  /  AST  11  /  ALT  6   /  AlkPhos  173<H>  08-08    PT/INR - ( 08 Aug 2017 05:45 )   PT: 13.3 SEC;   INR: 1.18          PTT - ( 08 Aug 2017 05:45 )  PTT:39.5 SEC          Assesment/Plan  57y year old Female admitted for planned PD cathter insertion.   - OR today: PD cathter   - consent in chart  - f/u nephrology  - DVT PPx  - Diet: NPO, IVF

## 2017-08-08 NOTE — PROGRESS NOTE ADULT - PROBLEM SELECTOR PLAN 1
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 Patient currently does not appear to be in fluid overload or uremic. Electrolytes are under acceptable limits. Does not need urgent dialysis at this time. Monitor renal function and BP.

## 2017-08-09 ENCOUNTER — TRANSCRIPTION ENCOUNTER (OUTPATIENT)
Age: 57
End: 2017-08-09

## 2017-08-09 DIAGNOSIS — E87.5 HYPERKALEMIA: ICD-10-CM

## 2017-08-09 LAB
BUN SERPL-MCNC: 51 MG/DL — HIGH (ref 7–23)
BUN SERPL-MCNC: 51 MG/DL — HIGH (ref 7–23)
BUN SERPL-MCNC: 52 MG/DL — HIGH (ref 7–23)
CALCIUM SERPL-MCNC: 8 MG/DL — LOW (ref 8.4–10.5)
CALCIUM SERPL-MCNC: 8 MG/DL — LOW (ref 8.4–10.5)
CALCIUM SERPL-MCNC: 8.6 MG/DL — SIGNIFICANT CHANGE UP (ref 8.4–10.5)
CHLORIDE SERPL-SCNC: 105 MMOL/L — SIGNIFICANT CHANGE UP (ref 98–107)
CHLORIDE SERPL-SCNC: 107 MMOL/L — SIGNIFICANT CHANGE UP (ref 98–107)
CHLORIDE SERPL-SCNC: 108 MMOL/L — HIGH (ref 98–107)
CO2 SERPL-SCNC: 14 MMOL/L — LOW (ref 22–31)
CO2 SERPL-SCNC: 16 MMOL/L — LOW (ref 22–31)
CO2 SERPL-SCNC: 17 MMOL/L — LOW (ref 22–31)
CREAT SERPL-MCNC: 4.96 MG/DL — HIGH (ref 0.5–1.3)
CREAT SERPL-MCNC: 5.1 MG/DL — HIGH (ref 0.5–1.3)
CREAT SERPL-MCNC: 5.23 MG/DL — HIGH (ref 0.5–1.3)
GLUCOSE SERPL-MCNC: 132 MG/DL — HIGH (ref 70–99)
GLUCOSE SERPL-MCNC: 152 MG/DL — HIGH (ref 70–99)
GLUCOSE SERPL-MCNC: 72 MG/DL — SIGNIFICANT CHANGE UP (ref 70–99)
HCT VFR BLD CALC: 41.5 % — SIGNIFICANT CHANGE UP (ref 34.5–45)
HGB BLD-MCNC: 12.2 G/DL — SIGNIFICANT CHANGE UP (ref 11.5–15.5)
MAGNESIUM SERPL-MCNC: 1.8 MG/DL — SIGNIFICANT CHANGE UP (ref 1.6–2.6)
MCHC RBC-ENTMCNC: 22.1 PG — LOW (ref 27–34)
MCHC RBC-ENTMCNC: 29.4 % — LOW (ref 32–36)
MCV RBC AUTO: 75 FL — LOW (ref 80–100)
NRBC # FLD: 0 — SIGNIFICANT CHANGE UP
PHOSPHATE SERPL-MCNC: 4.8 MG/DL — HIGH (ref 2.5–4.5)
PLATELET # BLD AUTO: 426 K/UL — HIGH (ref 150–400)
PMV BLD: 10 FL — SIGNIFICANT CHANGE UP (ref 7–13)
POTASSIUM SERPL-MCNC: 5.1 MMOL/L — SIGNIFICANT CHANGE UP (ref 3.5–5.3)
POTASSIUM SERPL-MCNC: 5.6 MMOL/L — HIGH (ref 3.5–5.3)
POTASSIUM SERPL-MCNC: 6.3 MMOL/L — CRITICAL HIGH (ref 3.5–5.3)
POTASSIUM SERPL-SCNC: 5.1 MMOL/L — SIGNIFICANT CHANGE UP (ref 3.5–5.3)
POTASSIUM SERPL-SCNC: 5.6 MMOL/L — HIGH (ref 3.5–5.3)
POTASSIUM SERPL-SCNC: 6.3 MMOL/L — CRITICAL HIGH (ref 3.5–5.3)
RBC # BLD: 5.53 M/UL — HIGH (ref 3.8–5.2)
RBC # FLD: 19.5 % — HIGH (ref 10.3–14.5)
SODIUM SERPL-SCNC: 138 MMOL/L — SIGNIFICANT CHANGE UP (ref 135–145)
SODIUM SERPL-SCNC: 139 MMOL/L — SIGNIFICANT CHANGE UP (ref 135–145)
SODIUM SERPL-SCNC: 141 MMOL/L — SIGNIFICANT CHANGE UP (ref 135–145)
WBC # BLD: 21.97 K/UL — HIGH (ref 3.8–10.5)
WBC # FLD AUTO: 21.97 K/UL — HIGH (ref 3.8–10.5)

## 2017-08-09 PROCEDURE — 99232 SBSQ HOSP IP/OBS MODERATE 35: CPT | Mod: GC

## 2017-08-09 PROCEDURE — 93010 ELECTROCARDIOGRAM REPORT: CPT

## 2017-08-09 RX ORDER — INSULIN HUMAN 100 [IU]/ML
10 INJECTION, SOLUTION SUBCUTANEOUS ONCE
Qty: 0 | Refills: 0 | Status: COMPLETED | OUTPATIENT
Start: 2017-08-09 | End: 2017-08-09

## 2017-08-09 RX ORDER — DEXTROSE 50 % IN WATER 50 %
50 SYRINGE (ML) INTRAVENOUS ONCE
Qty: 0 | Refills: 0 | Status: COMPLETED | OUTPATIENT
Start: 2017-08-09 | End: 2017-08-09

## 2017-08-09 RX ORDER — SODIUM BICARBONATE 1 MEQ/ML
650 SYRINGE (ML) INTRAVENOUS DAILY
Qty: 0 | Refills: 0 | Status: DISCONTINUED | OUTPATIENT
Start: 2017-08-09 | End: 2017-08-10

## 2017-08-09 RX ORDER — CALCIUM GLUCONATE 100 MG/ML
1 VIAL (ML) INTRAVENOUS ONCE
Qty: 1 | Refills: 0 | Status: DISCONTINUED | OUTPATIENT
Start: 2017-08-09 | End: 2017-08-09

## 2017-08-09 RX ORDER — CALCIUM GLUCONATE 100 MG/ML
1 VIAL (ML) INTRAVENOUS ONCE
Qty: 1 | Refills: 0 | Status: COMPLETED | OUTPATIENT
Start: 2017-08-09 | End: 2017-08-09

## 2017-08-09 RX ORDER — SODIUM CHLORIDE 9 MG/ML
1000 INJECTION INTRAMUSCULAR; INTRAVENOUS; SUBCUTANEOUS ONCE
Qty: 0 | Refills: 0 | Status: DISCONTINUED | OUTPATIENT
Start: 2017-08-09 | End: 2017-08-09

## 2017-08-09 RX ORDER — FUROSEMIDE 40 MG
20 TABLET ORAL ONCE
Qty: 0 | Refills: 0 | Status: DISCONTINUED | OUTPATIENT
Start: 2017-08-09 | End: 2017-08-09

## 2017-08-09 RX ORDER — SODIUM POLYSTYRENE SULFONATE 4.1 MEQ/G
15 POWDER, FOR SUSPENSION ORAL ONCE
Qty: 0 | Refills: 0 | Status: COMPLETED | OUTPATIENT
Start: 2017-08-09 | End: 2017-08-09

## 2017-08-09 RX ADMIN — INSULIN HUMAN 10 UNIT(S): 100 INJECTION, SOLUTION SUBCUTANEOUS at 08:04

## 2017-08-09 RX ADMIN — Medication 50 MILLILITER(S): at 08:03

## 2017-08-09 RX ADMIN — SODIUM POLYSTYRENE SULFONATE 15 GRAM(S): 4.1 POWDER, FOR SUSPENSION ORAL at 10:54

## 2017-08-09 RX ADMIN — HEPARIN SODIUM 5000 UNIT(S): 5000 INJECTION INTRAVENOUS; SUBCUTANEOUS at 15:14

## 2017-08-09 RX ADMIN — OXYCODONE HYDROCHLORIDE 5 MILLIGRAM(S): 5 TABLET ORAL at 20:06

## 2017-08-09 RX ADMIN — Medication 100 GRAM(S): at 08:03

## 2017-08-09 RX ADMIN — Medication 650 MILLIGRAM(S): at 15:14

## 2017-08-09 RX ADMIN — Medication 650 MILLIGRAM(S): at 16:17

## 2017-08-09 RX ADMIN — OXYCODONE HYDROCHLORIDE 5 MILLIGRAM(S): 5 TABLET ORAL at 11:36

## 2017-08-09 RX ADMIN — OXYCODONE HYDROCHLORIDE 5 MILLIGRAM(S): 5 TABLET ORAL at 10:35

## 2017-08-09 RX ADMIN — Medication 650 MILLIGRAM(S): at 15:17

## 2017-08-09 RX ADMIN — OXYCODONE HYDROCHLORIDE 5 MILLIGRAM(S): 5 TABLET ORAL at 19:06

## 2017-08-09 RX ADMIN — HEPARIN SODIUM 5000 UNIT(S): 5000 INJECTION INTRAVENOUS; SUBCUTANEOUS at 05:17

## 2017-08-09 RX ADMIN — OXYCODONE HYDROCHLORIDE 5 MILLIGRAM(S): 5 TABLET ORAL at 00:19

## 2017-08-09 NOTE — DISCHARGE NOTE ADULT - CARE PROVIDER_API CALL
Jean Anaya), ColonRectal Surgery; Surgery  [address] [place] [address]  Phone: [phone]  Fax: [phone]

## 2017-08-09 NOTE — PROGRESS NOTE ADULT - SUBJECTIVE AND OBJECTIVE BOX
[facility] Division of Kidney Diseases & Hypertension  FOLLOW UP NOTE  [phone]--------------------------------------------------------------------------------  Chief Complaint:Renal failure      24 hour events/subjective:    Patient seen and evaluated today. Denies complains of  nausea, vomiting, SOB, chest pain. Still has complains of abdominal pain.     PAST HISTORY  --------------------------------------------------------------------------------  No significant changes to PMH, PSH, FHx, SHx, unless otherwise noted    ALLERGIES & MEDICATIONS  --------------------------------------------------------------------------------  Allergies    No Known Allergies    Intolerances      Standing Inpatient Medications  heparin  Injectable 5000 Unit(s) SubCutaneous every 8 hours  sodium chloride 0.9%. 1000 milliLiter(s) IV Continuous <Continuous>  furosemide   Injectable 20 milliGRAM(s) IV Push once  sodium chloride 0.9% Bolus 1000 milliLiter(s) IV Bolus once    PRN Inpatient Medications  ondansetron Injectable 4 milliGRAM(s) IV Push once PRN  acetaminophen   Tablet. 650 milliGRAM(s) Oral every 6 hours PRN  oxyCODONE    IR 5 milliGRAM(s) Oral every 6 hours PRN      REVIEW OF SYSTEMS  --------------------------------------------------------------------------------  Gen: No  fevers/chills  Skin: No rashes  Head/Eyes/Ears/Mouth: No headache; Normal hearing; Normal vision w/o blurriness  Respiratory: No dyspnea, cough, wheezing, hemoptysis  CV: No chest pain, PND, orthopnea  GI: No abdominal pain, diarrhea, constipation, nausea, vomiting  : No increased frequency, dysuria, hematuria, nocturia  MSK: No joint pain/swelling; no back pain; no edema  Neuro: No dizziness/lightheadedness, weakness, seizures, numbness, tingling      All other systems were reviewed and are negative, except as noted.    VITALS/PHYSICAL EXAM  --------------------------------------------------------------------------------  T(C): 36.8 (08-09-17 @ 05:16), Max: 37.4 (08-09-17 @ 01:35)  HR: 70 (08-09-17 @ 05:16) (66 - 90)  BP: 123/72 (08-09-17 @ 05:16) (121/62 - 163/78)  RR: 17 (08-09-17 @ 05:16) (12 - 20)  SpO2: 96% (08-09-17 @ 05:16) (95% - 99%)  Wt(kg): --  Height (cm): 165.1 (08-08-17 @ 15:03)  Weight (kg): 57.2 (08-08-17 @ 15:03)  BMI (kg/m2): 21 (08-08-17 @ 15:03)  BSA (m2): 1.63 (08-08-17 @ 15:03)      08-08-17 @ 07:01  -  08-09-17 @ 07:00  --------------------------------------------------------  IN: 0 mL / OUT: 750 mL / NET: -750 mL      Physical Exam:  	Gen: NAD  	Pulm: CTA B/L  	CV: RRR, S1S2;  	Abd: +BS, soft, nontender/nondistended; PD catheter present; site appears clean.               Extremities: no bilateral LE edema noted.            LABS/STUDIES  --------------------------------------------------------------------------------              12.2   21.97 >-----------<  426      [08-09-17 @ 06:00]              41.5     139  |  108  |  51  ----------------------------<  72      [08-09-17 @ 06:00]  6.3   |  14  |  4.96        Ca     8.0     [08-09-17 @ 06:00]      Mg     1.8     [08-09-17 @ 06:00]      Phos  4.8     [08-09-17 @ 06:00]    TPro  6.9  /  Alb  3.4  /  TBili  1.1  /  DBili  0.3  /  AST  11  /  ALT  6   /  AlkPhos  173  [08-08-17 @ 05:45]    PT/INR: PT 13.3 , INR 1.18       [08-08-17 @ 05:45]  PTT: 39.5       [08-08-17 @ 05:45]      Creatinine Trend:  SCr 4.96 [08-09 @ 06:00]  SCr 4.54 [08-08 @ 12:08]  SCr 4.47 [08-07 @ 14:01]

## 2017-08-09 NOTE — PROGRESS NOTE ADULT - SUBJECTIVE AND OBJECTIVE BOX
ANESTHESIA POSTOP CHECK    57y Female POSTOP DAY 1 S/P laparoscopic peritoneal dialysis catheter placement    Vital Signs Last 24 Hrs  T(C): 36.8 (09 Aug 2017 05:16), Max: 37.4 (09 Aug 2017 01:35)  T(F): 98.2 (09 Aug 2017 05:16), Max: 99.4 (09 Aug 2017 01:35)  HR: 70 (09 Aug 2017 05:16) (66 - 90)  BP: 123/72 (09 Aug 2017 05:16) (121/62 - 163/78)  BP(mean): --  RR: 17 (09 Aug 2017 05:16) (12 - 20)  SpO2: 96% (09 Aug 2017 05:16) (95% - 99%)  I&O's Summary    08 Aug 2017 07:01  -  09 Aug 2017 07:00  --------------------------------------------------------  IN: 0 mL / OUT: 750 mL / NET: -750 mL        [x] NO APPARENT ANESTHESIA COMPLICATIONS

## 2017-08-09 NOTE — PROGRESS NOTE ADULT - ATTENDING COMMENTS
57 female with ESRD, admitted for PD catheter insertion.    Will follow up after catheter inserted to organize PD catheter lavage prior to dc home.
57 year old female with h/o HTN, polycythemia vera with ESRD requiring dialysis.    -had successful PD catheter insertion on 8/8.  hyperkalemic today, resolved with shifting measures and lasix.  Restarted outpatient Nabicarbonate and kayexalate.  -ok to dc home when cleared by surgery, and can follow up in PD clinic for education and initiaiton of dialysis.

## 2017-08-09 NOTE — DISCHARGE NOTE ADULT - HOSPITAL COURSE
57 year old woman with end stage renal disease presents today for placement of a peritoneal dialysis catheter.  Her nephrologist told her to come to the ED today as she would be needing dialysis.  She feels well and has no complaints.  She has never had abdominal surgeries.  She has been tolerating oral intake without issue. No fevers or chills.  She makes urine daily.  She has not yet undergone dialysis.  Patient taken to the OR  for insertion of peritoneal dialysis catheter. Post operatively patient hemodynamically stable and transferred to the floor. 57 year old woman with end stage renal disease presents today for placement of a peritoneal dialysis catheter.  Her nephrologist told her to come to the ED today as she would be needing dialysis.  She feels well and has no complaints.  She has never had abdominal surgeries.  She has been tolerating oral intake without issue. No fevers or chills.  She makes urine daily.  She has not yet undergone dialysis.  Patient taken to the OR  for insertion of peritoneal dialysis catheter.     Plan: Serum K+ noted to be 6.3. Kayaxelte, insulin, and lasix were given. Repeat K+ noted to be 5.6. As per discussion with private nephrologist, patient is on kayaxelete daily for elevated potassium as outpatient. Recommend to give one dose of kayaxelete now and to discharge with kayaxelete 15 g daily, once cleared for discharge by surgery.Serum K+ noted to be 6.3. Kayaxelte, insulin, and lasix were given. Repeat K+ noted to be 5.6. As per discussion with private nephrologist, patient is on kayaxelete daily for elevated potassium as outpatient. Recommend to give one dose of kayaxelete now and to discharge with kayaxelete 15 g daily.     Post operatively patient hemodynamically stable and transferred to the floor. 57 year old woman with end stage renal disease presented to [facility] 8/8 for placement of a peritoneal dialysis catheter.  Her nephrologist told her to come to the ED as she would be needing dialysis.  She has been tolerating oral intake without issue. No fevers or chills.  She makes urine daily.  She has not yet undergone dialysis.    Patient taken to the OR  for insertion of peritoneal dialysis catheter. Pt tolerated procedure well, without complication. Pt remained hemodynamically stable in the PACU and transferred to the surgical floor.     POD #1 patient's Serum K+ noted to be 6.3. Kayaxelte, insulin, and lasix were given. Repeat K+ noted to be 5.6. As per discussion with private nephrologist, patient is on kayaxelete daily for elevated potassium as outpatient. Patient was restarted on kayaxelete 15 g daily and sodium bicarbonate. Cleared by nephrology for discharge and follow-up in PD clinic.     Diet was restarted and advanced as tolerated. Pain control was transitioned from IV to PO pain meds. Pt currently ambulating, voiding, tolerating a regular diet, with pain well controlled on PO pain meds. Patient is stable for discharge home to follow up as an outpatient, instructed to call to schedule appointment.

## 2017-08-09 NOTE — PROGRESS NOTE ADULT - ASSESSMENT
A/P: 57F with ESRD s/p laparoscopic peritoneal dialysis catheter insertion. Pt hemodynamically stable and doing well. Pt having R shoulder pain likely 2/2 gas insuflation from laparoscopic surgery.  Pt had a critical K+ value of 6.3, EKG showed peaked T waves, calcium gluconate, insulin, lasix and kaopectate given. K+ decreased to 5.6, will check again later.  - Pain control - continue current regimen  - Strict I/O's  - F/U GI fxn  - OOB/DVT ppx - SQH  - f/u AM labs  - Diet: LRD  - dispo: possible discharge tonight or tomorrow pending pain control and potassium levels

## 2017-08-09 NOTE — PROGRESS NOTE ADULT - PROBLEM SELECTOR PLAN 3
BP stable. Continue with current antihypertensive medications. Monitor BP. Serum K+ noted to be 6.3. Kayaxelte, insulin, and lasix were given. Repeat K+ noted to be 5.6. As per discussion with private nephrologist, patient is on kayaxelete daily for elevated potassium as outpatient. Recommend to give one dose of kayaxelete now and to discharge with kayaxelete 15 g daily. Serum K+ noted to be 6.3. Kayaxelte, insulin, and lasix were given. Repeat K+ noted to be 5.6. As per discussion with private nephrologist, patient is on kayaxelete daily for elevated potassium as outpatient. Recommend to give one dose of kayaxelete now and to discharge with kayaxelete 15 g daily, once cleared for discharge by surgery.

## 2017-08-09 NOTE — DISCHARGE NOTE ADULT - PLAN OF CARE
You went to the OR for a peritoneal dialysis catheter in place, Follow a renal diet. Follow up with Dr. Anaya in one week. Please call to make an appointment. [phone]. Please follow up with your primary care physician regarding your hospitalization. Do not drive while taking pain medications

## 2017-08-09 NOTE — PROGRESS NOTE ADULT - PROBLEM SELECTOR PLAN 1
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 PaMonitor renal function and BP. 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 Can follow up with PD clinic as outpatient. Monitor renal function and BP. 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 Currenlty with no complaints. Can follow up with PD clinic as outpatient. Monitor renal function and BP.

## 2017-08-09 NOTE — PROGRESS NOTE ADULT - PROBLEM SELECTOR PLAN 2
No labs available from today. C/w sodium bicarbonate 650 mg daily. Monitor CO2 level. CO2 level noted to be 16 . C/w sodium bicarbonate 650 mg daily. Monitor CO2 level.

## 2017-08-09 NOTE — DISCHARGE NOTE ADULT - MEDICATION SUMMARY - MEDICATIONS TO TAKE
I will START or STAY ON the medications listed below when I get home from the hospital:    aspirin 81 mg oral tablet  -- 1 tab(s) by mouth once a day  -- Indication: For Prevent ischemia    oxyCODONE 5 mg oral tablet  -- 1 tab(s) by mouth every 4 hours MDD:6  -- Caution federal law prohibits the transfer of this drug to any person other  than the person for whom it was prescribed.  It is very important that you take or use this exactly as directed.  Do not skip doses or discontinue unless directed by your doctor.  May cause drowsiness.  Alcohol may intensify this effect.  Use care when operating dangerous machinery.  This prescription cannot be refilled.  Using more of this medication than prescribed may cause serious breathing problems.    -- Indication: For Pain    acetaminophen 325 mg oral tablet  -- 2 tab(s) by mouth every 6 hours, As needed, Mild Pain (1 - 3)  -- Indication: For Pain    sodium bicarbonate 650 mg oral tablet  -- 1 tab(s) by mouth once a day  -- Indication: For Kidney replacement    amLODIPine 10 mg oral tablet  -- 1 tab(s) by mouth once a day  -- Indication: For HTN    Lasix 40 mg oral tablet  -- 1 tab(s) by mouth 3 times a week M/W/F  -- Indication: For HTN    Kayexalate oral and rectal powder  -- 15 milligram(s) by mouth once a day  -- Not to be used with sorbitol  Shake well before use.    -- Indication: For Lower potassium

## 2017-08-09 NOTE — DISCHARGE NOTE ADULT - PATIENT PORTAL LINK FT
“You can access the Follow[employer] Patient Portal, offered by [facility], by registering with the following website: [email or web address]”

## 2017-08-09 NOTE — DISCHARGE NOTE ADULT - ADDITIONAL INSTRUCTIONS
- follow up in PD clinic for education and initiaiton of dialysis.  - Follow up with Dr. Anaya in one week. Please call to make an appointment. [phone]

## 2017-08-09 NOTE — DISCHARGE NOTE ADULT - CARE PLAN
Principal Discharge DX:	Peritoneal dialysis catheter in place  Goal:	You went to the OR for a peritoneal dialysis catheter in place,  Instructions for follow-up, activity and diet:	Follow a renal diet. Follow up with Dr. Anaya in one week. Please call to make an appointment. [phone]. Please follow up with your primary care physician regarding your hospitalization. Do not drive while taking pain medications Principal Discharge DX:	Peritoneal dialysis catheter in place  Goal:	You went to the OR for a peritoneal dialysis catheter in place,  Instructions for follow-up, activity and diet:	Follow a renal diet. Follow up with Dr. Anaya in one week. Please call to make an appointment. [phone]. Please follow up with your primary care physician regarding your hospitalization. Do not drive while taking pain medications Principal Discharge DX:	Peritoneal dialysis catheter in place  Goal:	You went to the OR for a peritoneal dialysis catheter in place,  Instructions for follow-up, activity and diet:	Follow a renal diet. Follow up with Dr. Anaya in one week. Please call to make an appointment. [phone]. Please follow up with your primary care physician regarding your hospitalization. Do not drive while taking pain medications Principal Discharge DX:	Peritoneal dialysis catheter in place  Goal:	You went to the OR for a peritoneal dialysis catheter in place,  Instructions for follow-up, activity and diet:	Follow a renal diet. Follow up with Dr. Anaya in one week. Please call to make an appointment. [phone]. Please follow up with your primary care physician regarding your hospitalization. Do not drive while taking pain medications Principal Discharge DX:	Peritoneal dialysis catheter in place  Goal:	You went to the OR for a peritoneal dialysis catheter in place,  Instructions for follow-up, activity and diet:	Follow a renal diet. Follow up with Dr. Anaya in one week. Please call to make an appointment. [phone]. Please follow up with your primary care physician regarding your hospitalization. Do not drive while taking pain medications

## 2017-08-09 NOTE — PROGRESS NOTE ADULT - SUBJECTIVE AND OBJECTIVE BOX
S: 58yo Woman seen and examined. No acute events overnight; afebrile, VS stable. Pain well controlled with current regimen. Tolerating diet; denies N/V.     O:  Vital Signs Last 24 Hrs  T(C): 36.9 (09 Aug 2017 10:21), Max: 37.4 (09 Aug 2017 01:35)  T(F): 98.4 (09 Aug 2017 10:21), Max: 99.4 (09 Aug 2017 01:35)  HR: 72 (09 Aug 2017 10:21) (66 - 90)  BP: 170/86 (09 Aug 2017 10:21) (123/72 - 170/86)  BP(mean): --  RR: 16 (09 Aug 2017 10:21) (12 - 19)  SpO2: 97% (09 Aug 2017 10:21) (95% - 99%)    I&O's Detail    08 Aug 2017 07:01  -  09 Aug 2017 07:00  --------------------------------------------------------  IN:  Total IN: 0 mL    OUT:    Voided: 750 mL  Total OUT: 750 mL    Total NET: -750 mL      09 Aug 2017 07:01  -  09 Aug 2017 13:29  --------------------------------------------------------  IN:  Total IN: 0 mL    OUT:    Voided: 200 mL  Total OUT: 200 mL    Total NET: -200 mL          Physical Exam:  Gen: Resting in bed, NAD, alert and oriented.   Resp: No increased WOB   Abd: soft, NT/ND    MEDICATIONS  (STANDING):  heparin  Injectable 5000 Unit(s) SubCutaneous every 8 hours  sodium chloride 0.9%. 1000 milliLiter(s) (30 mL/Hr) IV Continuous <Continuous>  sodium bicarbonate 650 milliGRAM(s) Oral daily    MEDICATIONS  (PRN):  acetaminophen   Tablet. 650 milliGRAM(s) Oral every 6 hours PRN Mild Pain (1 - 3)  oxyCODONE    IR 5 milliGRAM(s) Oral every 6 hours PRN Moderate Pain (4 - 6)      LABS:                        12.2   21.97 )-----------( 426      ( 09 Aug 2017 06:00 )             41.5       08-09    141  |  107  |  52<H>  ----------------------------<  152<H>  5.6<H>   |  16<L>  |  5.10<H>    Ca    8.6      09 Aug 2017 11:04  Phos  4.8     08-09  Mg     1.8     08-09    TPro  6.9  /  Alb  3.4  /  TBili  1.1  /  DBili  0.3<H>  /  AST  11  /  ALT  6   /  AlkPhos  173<H>  08-08

## 2017-08-10 VITALS
DIASTOLIC BLOOD PRESSURE: 79 MMHG | SYSTOLIC BLOOD PRESSURE: 136 MMHG | TEMPERATURE: 99 F | RESPIRATION RATE: 19 BRPM | OXYGEN SATURATION: 97 % | HEART RATE: 86 BPM

## 2017-08-10 LAB
BUN SERPL-MCNC: 52 MG/DL — HIGH (ref 7–23)
CALCIUM SERPL-MCNC: 8.2 MG/DL — LOW (ref 8.4–10.5)
CHLORIDE SERPL-SCNC: 105 MMOL/L — SIGNIFICANT CHANGE UP (ref 98–107)
CO2 SERPL-SCNC: 17 MMOL/L — LOW (ref 22–31)
CREAT SERPL-MCNC: 5.07 MG/DL — HIGH (ref 0.5–1.3)
GLUCOSE SERPL-MCNC: 100 MG/DL — HIGH (ref 70–99)
HCT VFR BLD CALC: 39.5 % — SIGNIFICANT CHANGE UP (ref 34.5–45)
HGB BLD-MCNC: 11.9 G/DL — SIGNIFICANT CHANGE UP (ref 11.5–15.5)
MAGNESIUM SERPL-MCNC: 1.8 MG/DL — SIGNIFICANT CHANGE UP (ref 1.6–2.6)
MCHC RBC-ENTMCNC: 22.2 PG — LOW (ref 27–34)
MCHC RBC-ENTMCNC: 30.1 % — LOW (ref 32–36)
MCV RBC AUTO: 73.7 FL — LOW (ref 80–100)
NRBC # FLD: 0 — SIGNIFICANT CHANGE UP
PHOSPHATE SERPL-MCNC: 4.9 MG/DL — HIGH (ref 2.5–4.5)
PLATELET # BLD AUTO: 367 K/UL — SIGNIFICANT CHANGE UP (ref 150–400)
PMV BLD: 9.7 FL — SIGNIFICANT CHANGE UP (ref 7–13)
POTASSIUM SERPL-MCNC: 5.3 MMOL/L — SIGNIFICANT CHANGE UP (ref 3.5–5.3)
POTASSIUM SERPL-SCNC: 5.3 MMOL/L — SIGNIFICANT CHANGE UP (ref 3.5–5.3)
RBC # BLD: 5.36 M/UL — HIGH (ref 3.8–5.2)
RBC # FLD: 19.3 % — HIGH (ref 10.3–14.5)
SODIUM SERPL-SCNC: 139 MMOL/L — SIGNIFICANT CHANGE UP (ref 135–145)
WBC # BLD: 15.52 K/UL — HIGH (ref 3.8–10.5)
WBC # FLD AUTO: 15.52 K/UL — HIGH (ref 3.8–10.5)

## 2017-08-10 RX ORDER — AMLODIPINE BESYLATE 2.5 MG/1
10 TABLET ORAL DAILY
Qty: 0 | Refills: 0 | Status: DISCONTINUED | OUTPATIENT
Start: 2017-08-10 | End: 2017-08-10

## 2017-08-10 RX ORDER — SODIUM POLYSTYRENE SULFONATE 4.1 MEQ/G
15 POWDER, FOR SUSPENSION ORAL DAILY
Qty: 0 | Refills: 0 | Status: DISCONTINUED | OUTPATIENT
Start: 2017-08-10 | End: 2017-08-10

## 2017-08-10 RX ORDER — DIPHENHYDRAMINE HCL 50 MG
25 CAPSULE ORAL ONCE
Qty: 0 | Refills: 0 | Status: DISCONTINUED | OUTPATIENT
Start: 2017-08-10 | End: 2017-08-10

## 2017-08-10 RX ORDER — SODIUM POLYSTYRENE SULFONATE 4.1 MEQ/G
15 POWDER, FOR SUSPENSION ORAL
Qty: 210 | Refills: 0 | OUTPATIENT
Start: 2017-08-10 | End: 2017-08-24

## 2017-08-10 RX ORDER — ACETAMINOPHEN 500 MG
2 TABLET ORAL
Qty: 0 | Refills: 0 | COMMUNITY
Start: 2017-08-10

## 2017-08-10 RX ADMIN — SODIUM POLYSTYRENE SULFONATE 15 GRAM(S): 4.1 POWDER, FOR SUSPENSION ORAL at 13:42

## 2017-08-10 RX ADMIN — Medication 650 MILLIGRAM(S): at 13:41

## 2017-08-10 RX ADMIN — HEPARIN SODIUM 5000 UNIT(S): 5000 INJECTION INTRAVENOUS; SUBCUTANEOUS at 05:54

## 2017-08-10 RX ADMIN — OXYCODONE HYDROCHLORIDE 5 MILLIGRAM(S): 5 TABLET ORAL at 08:08

## 2017-08-10 RX ADMIN — HEPARIN SODIUM 5000 UNIT(S): 5000 INJECTION INTRAVENOUS; SUBCUTANEOUS at 00:21

## 2017-08-10 RX ADMIN — OXYCODONE HYDROCHLORIDE 5 MILLIGRAM(S): 5 TABLET ORAL at 06:22

## 2017-08-10 RX ADMIN — OXYCODONE HYDROCHLORIDE 5 MILLIGRAM(S): 5 TABLET ORAL at 14:15

## 2017-08-10 RX ADMIN — OXYCODONE HYDROCHLORIDE 5 MILLIGRAM(S): 5 TABLET ORAL at 13:41

## 2017-08-10 RX ADMIN — HEPARIN SODIUM 5000 UNIT(S): 5000 INJECTION INTRAVENOUS; SUBCUTANEOUS at 13:42

## 2017-08-10 RX ADMIN — AMLODIPINE BESYLATE 10 MILLIGRAM(S): 2.5 TABLET ORAL at 05:53

## 2017-08-10 NOTE — PROGRESS NOTE ADULT - SUBJECTIVE AND OBJECTIVE BOX
GENERAL SURGERY DAILY PROGRESS NOTE:    ===================================  A Team Surgical Service Pager   ===================================    Hospital Day: 4    Postoperative Day: 2    Status post: laparoscopic PD cath isnertion    Subjective:  Pain controlled. Denies N/V. Tolerating diet. Passing flatus and BM.        Objective:    PE:  Gen: NAD  Resp: airway patent, respirations unlabored, no increased WoB  CVS: RRR  Abd: soft, ND, appropriately TTP, no rebound or guarding  Ext: no edema, WWP  Neuro: AAOx3, no focal deficits    Vital Signs Last 24 Hrs  T(C): 36.9 (10 Aug 2017 05:52), Max: 37 (09 Aug 2017 14:58)  T(F): 98.4 (10 Aug 2017 05:52), Max: 98.6 (09 Aug 2017 14:58)  HR: 75 (10 Aug 2017 05:52) (72 - 84)  BP: 148/87 (10 Aug 2017 05:52) (146/87 - 176/77)  BP(mean): --  RR: 16 (10 Aug 2017 05:52) (16 - 18)  SpO2: 98% (10 Aug 2017 05:52) (97% - 98%)    I&O's Detail    09 Aug 2017 07:01  -  10 Aug 2017 07:00  --------------------------------------------------------  IN:  Total IN: 0 mL    OUT:    Voided: 600 mL  Total OUT: 600 mL    Total NET: -600 mL          Daily     Daily Weight in k (10 Aug 2017 01:42)    MEDICATIONS  (STANDING):  heparin  Injectable 5000 Unit(s) SubCutaneous every 8 hours  sodium chloride 0.9%. 1000 milliLiter(s) (30 mL/Hr) IV Continuous <Continuous>  sodium bicarbonate 650 milliGRAM(s) Oral daily  amLODIPine   Tablet 10 milliGRAM(s) Oral daily    MEDICATIONS  (PRN):  acetaminophen   Tablet. 650 milliGRAM(s) Oral every 6 hours PRN Mild Pain (1 - 3)  oxyCODONE    IR 5 milliGRAM(s) Oral every 6 hours PRN Moderate Pain (4 - 6)  diphenhydrAMINE   Capsule 25 milliGRAM(s) Oral once PRN Rash and/or Itching      LABS:                        11.9   15.52 )-----------( 367      ( 10 Aug 2017 05:30 )             39.5     08-10    139  |  105  |  52<H>  ----------------------------<  100<H>  5.3   |  17<L>  |  5.07<H>    Ca    8.2<L>      10 Aug 2017 05:30  Phos  4.9     08-10  Mg     1.8     08-10            RADIOLOGY & ADDITIONAL STUDIES:

## 2017-08-10 NOTE — PROGRESS NOTE ADULT - ASSESSMENT
57yF s/p lap PD cath insertion, no w/ hyperkalemia  - K remains elevated t/d  - 15g Kayexalate PO QD per nephro recs  - PO pain control  - OOB ambulate  - possible d/c home t/d

## 2017-08-10 NOTE — CHART NOTE - NSCHARTNOTEFT_GEN_A_CORE
Discussed with primary surgical team -- [place] with renal service to [place] home.  I will organize outpatient followup for PD initiation and education.  F/U with Dr. Anaya as per surgical recommendations.

## 2017-08-16 ENCOUNTER — APPOINTMENT (OUTPATIENT)
Dept: NEPHROLOGY | Facility: CLINIC | Age: 57
End: 2017-08-16

## 2017-08-16 VITALS
BODY MASS INDEX: 23.59 KG/M2 | WEIGHT: 120.15 LBS | DIASTOLIC BLOOD PRESSURE: 84 MMHG | HEIGHT: 60 IN | OXYGEN SATURATION: 98 % | SYSTOLIC BLOOD PRESSURE: 125 MMHG | HEART RATE: 66 BPM

## 2017-08-18 LAB
ALBUMIN SERPL ELPH-MCNC: 3.6 G/DL
ANION GAP SERPL CALC-SCNC: 17 MMOL/L
BUN SERPL-MCNC: 44 MG/DL
CALCIUM SERPL-MCNC: 8.2 MG/DL
CHLORIDE SERPL-SCNC: 101 MMOL/L
CO2 SERPL-SCNC: 19 MMOL/L
CREAT SERPL-MCNC: 4.35 MG/DL
GLUCOSE SERPL-MCNC: 100 MG/DL
PHOSPHATE SERPL-MCNC: 5.2 MG/DL
POTASSIUM SERPL-SCNC: 5.1 MMOL/L
SODIUM SERPL-SCNC: 137 MMOL/L

## 2017-08-21 ENCOUNTER — APPOINTMENT (OUTPATIENT)
Dept: SURGERY | Facility: CLINIC | Age: 57
End: 2017-08-21

## 2017-08-24 ENCOUNTER — APPOINTMENT (OUTPATIENT)
Dept: INTERNAL MEDICINE | Facility: CLINIC | Age: 57
End: 2017-08-24

## 2017-09-03 ENCOUNTER — INPATIENT (INPATIENT)
Facility: HOSPITAL | Age: 57
LOS: 17 days | Discharge: HOME | DRG: 356 | End: 2017-09-21
Attending: HOSPITALIST | Admitting: INTERNAL MEDICINE
Payer: MEDICAID

## 2017-09-03 VITALS
WEIGHT: 125 LBS | OXYGEN SATURATION: 95 % | SYSTOLIC BLOOD PRESSURE: 148 MMHG | RESPIRATION RATE: 31 BRPM | HEIGHT: 62 IN | TEMPERATURE: 99 F | DIASTOLIC BLOOD PRESSURE: 76 MMHG | HEART RATE: 88 BPM

## 2017-09-03 DIAGNOSIS — I10 ESSENTIAL (PRIMARY) HYPERTENSION: ICD-10-CM

## 2017-09-03 DIAGNOSIS — K65.0 GENERALIZED (ACUTE) PERITONITIS: ICD-10-CM

## 2017-09-03 DIAGNOSIS — R33.9 RETENTION OF URINE, UNSPECIFIED: ICD-10-CM

## 2017-09-03 DIAGNOSIS — Z99.2 DEPENDENCE ON RENAL DIALYSIS: Chronic | ICD-10-CM

## 2017-09-03 DIAGNOSIS — D45 POLYCYTHEMIA VERA: ICD-10-CM

## 2017-09-03 DIAGNOSIS — N18.6 END STAGE RENAL DISEASE: ICD-10-CM

## 2017-09-03 DIAGNOSIS — T85.898A OTHER SPECIFIED COMPLICATION OF OTHER INTERNAL PROSTHETIC DEVICES, IMPLANTS AND GRAFTS, INITIAL ENCOUNTER: ICD-10-CM

## 2017-09-03 DIAGNOSIS — Z29.9 ENCOUNTER FOR PROPHYLACTIC MEASURES, UNSPECIFIED: ICD-10-CM

## 2017-09-03 LAB
ALBUMIN SERPL ELPH-MCNC: 3 G/DL — LOW (ref 3.3–5)
ALP SERPL-CCNC: 116 U/L — SIGNIFICANT CHANGE UP (ref 40–120)
ALT FLD-CCNC: 6 U/L RC — LOW (ref 10–45)
ANION GAP SERPL CALC-SCNC: 16 MMOL/L — SIGNIFICANT CHANGE UP (ref 5–17)
ANISOCYTOSIS BLD QL: SIGNIFICANT CHANGE UP
APPEARANCE UR: CLEAR — SIGNIFICANT CHANGE UP
APTT BLD: 33.1 SEC — SIGNIFICANT CHANGE UP (ref 27.5–37.4)
AST SERPL-CCNC: 11 U/L — SIGNIFICANT CHANGE UP (ref 10–40)
BACTERIA # UR AUTO: ABNORMAL /HPF
BASOPHILS # BLD AUTO: 0.1 K/UL — SIGNIFICANT CHANGE UP (ref 0–0.2)
BASOPHILS NFR BLD AUTO: 0.3 % — SIGNIFICANT CHANGE UP (ref 0–2)
BILIRUB SERPL-MCNC: 0.9 MG/DL — SIGNIFICANT CHANGE UP (ref 0.2–1.2)
BILIRUB UR-MCNC: NEGATIVE — SIGNIFICANT CHANGE UP
BLD GP AB SCN SERPL QL: NEGATIVE — SIGNIFICANT CHANGE UP
BUN SERPL-MCNC: 51 MG/DL — HIGH (ref 7–23)
CALCIUM SERPL-MCNC: 7.1 MG/DL — LOW (ref 8.4–10.5)
CHLORIDE SERPL-SCNC: 111 MMOL/L — HIGH (ref 96–108)
CO2 SERPL-SCNC: 16 MMOL/L — LOW (ref 22–31)
COLOR SPEC: SIGNIFICANT CHANGE UP
CREAT SERPL-MCNC: 4.92 MG/DL — HIGH (ref 0.5–1.3)
DACRYOCYTES BLD QL SMEAR: SLIGHT — SIGNIFICANT CHANGE UP
DIFF PNL FLD: NEGATIVE — SIGNIFICANT CHANGE UP
ELLIPTOCYTES BLD QL SMEAR: SLIGHT — SIGNIFICANT CHANGE UP
EOSINOPHIL # BLD AUTO: 0.2 K/UL — SIGNIFICANT CHANGE UP (ref 0–0.5)
EOSINOPHIL NFR BLD AUTO: 0.9 % — SIGNIFICANT CHANGE UP (ref 0–6)
EPI CELLS # UR: SIGNIFICANT CHANGE UP /HPF
GLUCOSE SERPL-MCNC: 115 MG/DL — HIGH (ref 70–99)
GLUCOSE UR QL: ABNORMAL
HCT VFR BLD CALC: 26.5 % — LOW (ref 34.5–45)
HCT VFR BLD CALC: 26.8 % — LOW (ref 34.5–45)
HGB BLD-MCNC: 8.5 G/DL — LOW (ref 11.5–15.5)
HGB BLD-MCNC: 8.5 G/DL — LOW (ref 11.5–15.5)
HYPOCHROMIA BLD QL: SLIGHT — SIGNIFICANT CHANGE UP
INR BLD: 1.22 RATIO — HIGH (ref 0.88–1.16)
KETONES UR-MCNC: NEGATIVE — SIGNIFICANT CHANGE UP
LEUKOCYTE ESTERASE UR-ACNC: NEGATIVE — SIGNIFICANT CHANGE UP
LYMPHOCYTES # BLD AUTO: 1.2 K/UL — SIGNIFICANT CHANGE UP (ref 1–3.3)
LYMPHOCYTES # BLD AUTO: 6.2 % — LOW (ref 13–44)
MACROCYTES BLD QL: SLIGHT — SIGNIFICANT CHANGE UP
MAGNESIUM SERPL-MCNC: 1.8 MG/DL — SIGNIFICANT CHANGE UP (ref 1.6–2.6)
MCHC RBC-ENTMCNC: 24.3 PG — LOW (ref 27–34)
MCHC RBC-ENTMCNC: 24.6 PG — LOW (ref 27–34)
MCHC RBC-ENTMCNC: 31.8 GM/DL — LOW (ref 32–36)
MCHC RBC-ENTMCNC: 32.2 GM/DL — SIGNIFICANT CHANGE UP (ref 32–36)
MCV RBC AUTO: 76.3 FL — LOW (ref 80–100)
MCV RBC AUTO: 76.5 FL — LOW (ref 80–100)
MICROCYTES BLD QL: SLIGHT — SIGNIFICANT CHANGE UP
MONOCYTES # BLD AUTO: 0.6 K/UL — SIGNIFICANT CHANGE UP (ref 0–0.9)
MONOCYTES NFR BLD AUTO: 3.4 % — SIGNIFICANT CHANGE UP (ref 2–14)
NEUTROPHILS # BLD AUTO: 16.7 K/UL — HIGH (ref 1.8–7.4)
NEUTROPHILS NFR BLD AUTO: 89.1 % — HIGH (ref 43–77)
NITRITE UR-MCNC: NEGATIVE — SIGNIFICANT CHANGE UP
PH UR: 6.5 — SIGNIFICANT CHANGE UP (ref 5–8)
PHOSPHATE SERPL-MCNC: 6.4 MG/DL — HIGH (ref 2.5–4.5)
PLAT MORPH BLD: NORMAL — SIGNIFICANT CHANGE UP
PLATELET # BLD AUTO: 387 K/UL — SIGNIFICANT CHANGE UP (ref 150–400)
PLATELET # BLD AUTO: 434 K/UL — HIGH (ref 150–400)
POIKILOCYTOSIS BLD QL AUTO: SLIGHT — SIGNIFICANT CHANGE UP
POTASSIUM SERPL-MCNC: 4.5 MMOL/L — SIGNIFICANT CHANGE UP (ref 3.5–5.3)
POTASSIUM SERPL-SCNC: 4.5 MMOL/L — SIGNIFICANT CHANGE UP (ref 3.5–5.3)
PROT SERPL-MCNC: 5.5 G/DL — LOW (ref 6–8.3)
PROT UR-MCNC: 100 MG/DL
PROTHROM AB SERPL-ACNC: 13.3 SEC — HIGH (ref 9.8–12.7)
RBC # BLD: 3.47 M/UL — LOW (ref 3.8–5.2)
RBC # BLD: 3.5 M/UL — LOW (ref 3.8–5.2)
RBC # FLD: 20.8 % — HIGH (ref 10.3–14.5)
RBC # FLD: 21 % — HIGH (ref 10.3–14.5)
RBC BLD AUTO: ABNORMAL
RBC CASTS # UR COMP ASSIST: SIGNIFICANT CHANGE UP /HPF (ref 0–2)
RH IG SCN BLD-IMP: POSITIVE — SIGNIFICANT CHANGE UP
SCHISTOCYTES BLD QL AUTO: SLIGHT — SIGNIFICANT CHANGE UP
SODIUM SERPL-SCNC: 143 MMOL/L — SIGNIFICANT CHANGE UP (ref 135–145)
SP GR SPEC: 1.01 — SIGNIFICANT CHANGE UP (ref 1.01–1.02)
TARGETS BLD QL SMEAR: SLIGHT — SIGNIFICANT CHANGE UP
UROBILINOGEN FLD QL: NEGATIVE — SIGNIFICANT CHANGE UP
WBC # BLD: 18.4 K/UL — HIGH (ref 3.8–10.5)
WBC # BLD: 18.7 K/UL — HIGH (ref 3.8–10.5)
WBC # FLD AUTO: 18.4 K/UL — HIGH (ref 3.8–10.5)
WBC # FLD AUTO: 18.7 K/UL — HIGH (ref 3.8–10.5)
WBC UR QL: SIGNIFICANT CHANGE UP /HPF (ref 0–5)

## 2017-09-03 PROCEDURE — 99232 SBSQ HOSP IP/OBS MODERATE 35: CPT

## 2017-09-03 PROCEDURE — 99291 CRITICAL CARE FIRST HOUR: CPT

## 2017-09-03 RX ORDER — FENTANYL CITRATE 50 UG/ML
25 INJECTION INTRAVENOUS EVERY 4 HOURS
Qty: 0 | Refills: 0 | Status: DISCONTINUED | OUTPATIENT
Start: 2017-09-03 | End: 2017-09-04

## 2017-09-03 RX ORDER — CEFEPIME 1 G/1
500 INJECTION, POWDER, FOR SOLUTION INTRAMUSCULAR; INTRAVENOUS
Qty: 0 | Refills: 0 | Status: DISCONTINUED | OUTPATIENT
Start: 2017-09-05 | End: 2017-09-08

## 2017-09-03 RX ORDER — SODIUM BICARBONATE 1 MEQ/ML
1 SYRINGE (ML) INTRAVENOUS
Qty: 0 | Refills: 0 | COMMUNITY

## 2017-09-03 RX ORDER — CEFEPIME 1 G/1
500 INJECTION, POWDER, FOR SOLUTION INTRAMUSCULAR; INTRAVENOUS ONCE
Qty: 0 | Refills: 0 | Status: COMPLETED | OUTPATIENT
Start: 2017-09-03 | End: 2017-09-03

## 2017-09-03 RX ORDER — FUROSEMIDE 40 MG
1 TABLET ORAL
Qty: 0 | Refills: 0 | COMMUNITY

## 2017-09-03 RX ORDER — SODIUM CHLORIDE 9 MG/ML
1000 INJECTION, SOLUTION INTRAVENOUS
Qty: 0 | Refills: 0 | Status: DISCONTINUED | OUTPATIENT
Start: 2017-09-03 | End: 2017-09-05

## 2017-09-03 RX ORDER — FENTANYL CITRATE 50 UG/ML
25 INJECTION INTRAVENOUS ONCE
Qty: 0 | Refills: 0 | Status: DISCONTINUED | OUTPATIENT
Start: 2017-09-03 | End: 2017-09-03

## 2017-09-03 RX ORDER — CEFEPIME 1 G/1
INJECTION, POWDER, FOR SOLUTION INTRAMUSCULAR; INTRAVENOUS
Qty: 0 | Refills: 0 | Status: DISCONTINUED | OUTPATIENT
Start: 2017-09-03 | End: 2017-09-08

## 2017-09-03 RX ADMIN — SODIUM CHLORIDE 50 MILLILITER(S): 9 INJECTION, SOLUTION INTRAVENOUS at 23:12

## 2017-09-03 RX ADMIN — CEFEPIME 100 MILLIGRAM(S): 1 INJECTION, POWDER, FOR SOLUTION INTRAMUSCULAR; INTRAVENOUS at 21:35

## 2017-09-03 RX ADMIN — FENTANYL CITRATE 25 MICROGRAM(S): 50 INJECTION INTRAVENOUS at 20:19

## 2017-09-03 RX ADMIN — FENTANYL CITRATE 25 MICROGRAM(S): 50 INJECTION INTRAVENOUS at 20:35

## 2017-09-03 NOTE — H&P ADULT - PROBLEM SELECTOR PLAN 3
1) Hold Peritoneal Dialysis  2) Continue Sodium Bicarbonate for chronic hyperkalemia/acidosis  3) Nephrology consult--patient of Dr. De León--for further interventions/dialysis options  4) Hold diuretics for now in setting of acute bleed  5) Consider topete catheter placement for strict intake and output 1) Hold Peritoneal Dialysis  2) Sodium Bicarbonate IV for chronic hyperkalemia/acidosis while patient is NPO  3) Nephrology consult--patient of Dr. De León--for further interventions/dialysis options  4) Hold diuretics for now in setting of acute bleed  5) Consider topete catheter placement for strict intake and output

## 2017-09-03 NOTE — H&P ADULT - NSHPLABSRESULTS_GEN_ALL_CORE
Labs and radiology reviewed from [facility].  Significant for:  Bloody peritoneal fluid with negative gram stain  Hgb drop from 10(12am) to 7(12pm) on 9/2  Hyperkalemia    CT shows high density fluid in lower peritoneal cavity c/w blood, PD cath in place

## 2017-09-03 NOTE — H&P ADULT - HISTORY OF PRESENT ILLNESS
This is a 58 yo F PMH HTN, Polycythemia Vera on Hydroxyurea, and ESRD on peritoneal dialysis sp catheter placement in August 2017 transferred from [facility] for further management of peritonitis/hemoperitoneum.  Patient presented to OSH c/o abdominal tenderness and multiple syncopal episodes.  Patient reports she was at home last night having dessert when she began to experience extreme abdominal pain. The pain was described as constant, stabbing pain, 10/10 that was worse with movement.  She reports becoming weak and almost losing consciousness.  Later that evening, patient reports an episode This is a 56 yo F PMH HTN, Polycythemia Vera on Hydroxyurea, and ESRD on peritoneal dialysis sp catheter placement in August 2017 transferred from [facility] for further management of peritonitis/hemoperitoneum.  Patient presented to OSH c/o abdominal tenderness and multiple syncopal episodes.  Patient reports she was at home last night having dessert when she began to experience extreme abdominal pain. The pain was described as constant, stabbing pain, 10/10 that was worse with movement.  She reports becoming weak and almost losing consciousness.  Later that evening, patient reports a second episode of syncope while having a bowel movement.  At the time, her family called EMS and she was taken to [facility].    At [facility], This is a 56 yo F PMH HTN, Polycythemia Vera on Hydroxyurea, and ESRD on peritoneal dialysis sp catheter placement in August 2017 transferred from [facility] for further management of peritonitis/hemoperitoneum.  Patient presented to OSH c/o abdominal tenderness and multiple syncopal episodes.  Patient reports she was at home last night having dessert when she began to experience extreme abdominal pain. The pain was described as constant, stabbing pain, 10/10 that was worse with movement.  She reports becoming weak and almost losing consciousness.  Later that evening, patient reports a second episode of syncope while having a bowel movement.  At that time, her family called EMS and she was taken to [facility].    At [facility], patient hypotensive to 90s requiring 1.5L IVF for fluid resuscitation. CT Abdomen/Pelvis showing: moderate amount of fluid in the abdomen and pelvis with collections of hematoma/hemorrhage, largest collections in the pelvis. Peritoneal fluid sent for analysis showing large red cell count (3,139,000) and white blood cell count (7478), negative gram stain.  Initial Hgb 10.4 dropped to 7.6 requiring transfusion of one unit of PRBC.  Patient also noted to have leukocytosis to 31.8 treated with Vancomycin and Ceftriaxone x 1, then continued on Cefepime for possible SBP.      As patient's nephrologist is located at [facility] and patient receives most of her care at [facility], patient was transferred to [facility] per family request. Upon arrival, patient hemodynamically stable with shauna blood noted in PD catheter.  Patient with diffuse abdominal pain, however, abdomen remains soft to touch with positive bowel sounds.  Nephrology informed of patient admission to ICU for further dialysis management, Surgery consulted for possible intervention. This is a 56 yo F PMH HTN, Polycythemia Vera on Hydroxyurea, and ESRD on peritoneal dialysis sp catheter placement in August 2017 transferred from [facility] for further management of peritonitis/hemoperitoneum.  Patient presented to OSH c/o abdominal tenderness and multiple syncopal episodes.  Patient reports she was at home last night.  From 1-6pm she had indwelling peritoneal fluid, and it drained clear.  At 11pm she was having dessert when she began to experience extreme abdominal pain. The pain was described as constant, stabbing pain, 10/10 that was worse with movement.  She reports becoming weak and almost losing consciousness.  Later that evening, patient reports a second episode of syncope while having a bowel movement.  She and her family noted blood in the peritoneal catheter at that time.  At that time, her family called EMS and she was taken to [facility].    At [facility], patient hypotensive to 90s requiring 1.5L IVF for fluid resuscitation. CT Abdomen/Pelvis showing: moderate amount of fluid in the abdomen and pelvis with collections of hematoma/hemorrhage, largest collections in the pelvis. Peritoneal fluid sent for analysis showing large red cell count (3,139,000) and white blood cell count (7478), negative gram stain.  Initial Hgb 10.4 dropped to 7.6 requiring transfusion of one unit of PRBC.  Patient also noted to have leukocytosis to 31.8 treated with Vancomycin and Ceftriaxone x 1, then continued on Cefepime for possible SBP.      As patient's nephrologist is located at [facility] and patient receives most of her care at [facility], patient was transferred to [facility] per family request. Upon arrival, patient hemodynamically stable with shauna blood noted in PD catheter.  Patient with diffuse abdominal pain, however, abdomen remains soft to touch with positive bowel sounds.  Nephrology informed of patient admission to ICU for further dialysis management, Surgery consulted for possible intervention. This is a 58 yo F PMH HTN, Polycythemia Vera on Hydroxyurea, and ESRD on peritoneal dialysis sp catheter placement in August 2017 transferred from [facility] for further management of peritonitis/hemoperitoneum.  Patient presented to OSH c/o abdominal tenderness and multiple syncopal episodes.  Patient reports she was at home last night.  From 1-6pm she had indwelling peritoneal fluid, and it drained clear.  At 11pm she was having dessert when she began to experience extreme abdominal pain. The pain was described as constant, stabbing pain, 10/10 that was worse with movement.  She reports becoming weak and almost losing consciousness.  Later that evening, patient reports a second episode of syncope while having a bowel movement.  She and her family noted blood in the peritoneal catheter at that time.  At that time, her family called EMS and she was taken to [facility].    At [facility], patient hypotensive to 90s requiring 1.5L IVF for fluid resuscitation. CT Abdomen/Pelvis showing: moderate amount of fluid in the abdomen and pelvis with collections of hematoma/hemorrhage, largest collections in the pelvis. Peritoneal fluid sent for analysis showing large red cell count (3,139,000) and white blood cell count (7478), negative gram stain.  Initial Hgb 10.4 dropped to 7.6 requiring transfusion of one unit of PRBC.  Patient also noted to have leukocytosis to 31.8 treated with Vancomycin and Ceftriaxone x 1, then continued on Cefepime for possible SBP.      As patient's nephrologist is located at [facility] and patient receives most of her care at [facility], patient was transferred to [facility] per family request and for possible continued PD.

## 2017-09-03 NOTE — H&P ADULT - PROBLEM SELECTOR PLAN 7
1) Bladder scan to eval for urinary retention  2) Consider placement of topete catheter for urinary retention  3) Strict Intake and Output monitoring

## 2017-09-03 NOTE — H&P ADULT - PMH
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

## 2017-09-03 NOTE — H&P ADULT - ASSESSMENT
This is a 58 yo F PMH HTN, Polycythemia Vera, ESRD on Peritoneal Dialysis admitted with hemoperitoneum. This is a 58 yo F PMH HTN, Polycythemia Vera, ESRD on Peritoneal Dialysis admitted with hemoperitoneum, with resultant acute posthemorrhagic anemia, syncope due to blood loss, severe abdominal pain.  Will r/o bacterial peritonitis.

## 2017-09-03 NOTE — H&P ADULT - PROBLEM SELECTOR PLAN 2
1) Blood Culture x 2, Urinalysis  2) Cefepime for broad spectrum coverage  3) Consider ID consult if change/deterioration in patient status  4) Trend WBC count as patient admitted with leukocytosis 1) Blood Culture x 2, Urinalysis, f/u peritoneal culture  2) Cefepime for broad spectrum coverage  3) Trend WBC count as patient admitted with leukocytosis

## 2017-09-03 NOTE — H&P ADULT - PROBLEM SELECTOR PLAN 1
1) CBC q4H  2) Transfuse PRBCs to maintain Hgb > 8  3) Surgery consult for possible intervention  4) Serial Abdominal Exams  5) Hold Peritoneal Dialysis  6) Hold Aspirin   7) Monitor VS q1H  8)  Maintain patient NPO for now; glucose q6H 1) CBC q4H  2) Transfuse PRBCs to maintain Hgb > 8  3) Surgery consult for possible intervention  4) Serial Abdominal Exams  5) Hold Peritoneal Dialysis  6) Hold Aspirin   7)  Maintain patient NPO for now; glucose q6H

## 2017-09-03 NOTE — H&P ADULT - NSHPSOCIALHISTORY_GEN_ALL_CORE
Lives at home with  and daughter.  No longer working since onset of ESRD/peritoneal dialysis.  Denies use of tobacco, alcohol, drugs.

## 2017-09-03 NOTE — H&P ADULT - GASTROINTESTINAL COMMENTS
Diffuse abdominal pain 10/10 worse with movement, palpation Peritoneal dialysis catheter in place with blood noted in tubing

## 2017-09-03 NOTE — H&P ADULT - ATTENDING COMMENTS
care provided 9/3/17  critical care time 40 mins    Patient seen and examined.  Agree with NP note as written.   Patient presents to us from [facility] with bloody peritoneal fluid in the setting of subacute PD catheter placement.  No discernable trauma or inciting event for this bleed.  Patient initially had labile BP at [place] that was easily stabilized with IVF and 1U RBC.  Will trend hgb here and maintain hgb>8 while actively bleeding.  Will consult renal ([facility]) and surgery for guidance regarding management of this peritoneal bleed.  Empiric abx while we are ruling out bacterial peritonitis, will hold off on peritoneal abx for now given negative gram stain, and also that peritoneal white count is approximately normal when corrected for red blood cell count.

## 2017-09-04 DIAGNOSIS — N18.9 CHRONIC KIDNEY DISEASE, UNSPECIFIED: ICD-10-CM

## 2017-09-04 DIAGNOSIS — K65.9 PERITONITIS, UNSPECIFIED: ICD-10-CM

## 2017-09-04 DIAGNOSIS — N18.6 END STAGE RENAL DISEASE: ICD-10-CM

## 2017-09-04 LAB
ANION GAP SERPL CALC-SCNC: 15 MMOL/L — SIGNIFICANT CHANGE UP (ref 5–17)
ANION GAP SERPL CALC-SCNC: 16 MMOL/L — SIGNIFICANT CHANGE UP (ref 5–17)
BUN SERPL-MCNC: 44 MG/DL — HIGH (ref 7–23)
BUN SERPL-MCNC: 47 MG/DL — HIGH (ref 7–23)
BUN SERPL-MCNC: 49 MG/DL — HIGH (ref 7–23)
BUN SERPL-MCNC: 50 MG/DL — HIGH (ref 7–23)
CALCIUM SERPL-MCNC: 7.2 MG/DL — LOW (ref 8.4–10.5)
CALCIUM SERPL-MCNC: 7.3 MG/DL — LOW (ref 8.4–10.5)
CHLORIDE SERPL-SCNC: 104 MMOL/L — SIGNIFICANT CHANGE UP (ref 96–108)
CHLORIDE SERPL-SCNC: 107 MMOL/L — SIGNIFICANT CHANGE UP (ref 96–108)
CHLORIDE SERPL-SCNC: 109 MMOL/L — HIGH (ref 96–108)
CHLORIDE SERPL-SCNC: 111 MMOL/L — HIGH (ref 96–108)
CO2 SERPL-SCNC: 18 MMOL/L — LOW (ref 22–31)
CO2 SERPL-SCNC: 20 MMOL/L — LOW (ref 22–31)
CO2 SERPL-SCNC: 21 MMOL/L — LOW (ref 22–31)
CO2 SERPL-SCNC: 22 MMOL/L — SIGNIFICANT CHANGE UP (ref 22–31)
CREAT SERPL-MCNC: 4.52 MG/DL — HIGH (ref 0.5–1.3)
CREAT SERPL-MCNC: 4.71 MG/DL — HIGH (ref 0.5–1.3)
CREAT SERPL-MCNC: 4.91 MG/DL — HIGH (ref 0.5–1.3)
CREAT SERPL-MCNC: 4.95 MG/DL — HIGH (ref 0.5–1.3)
GLUCOSE SERPL-MCNC: 109 MG/DL — HIGH (ref 70–99)
GLUCOSE SERPL-MCNC: 115 MG/DL — HIGH (ref 70–99)
GLUCOSE SERPL-MCNC: 116 MG/DL — HIGH (ref 70–99)
GLUCOSE SERPL-MCNC: 125 MG/DL — HIGH (ref 70–99)
HCT VFR BLD CALC: 25.6 % — LOW (ref 34.5–45)
HCT VFR BLD CALC: 25.6 % — LOW (ref 34.5–45)
HCT VFR BLD CALC: 28.2 % — LOW (ref 34.5–45)
HGB BLD-MCNC: 8.2 G/DL — LOW (ref 11.5–15.5)
HGB BLD-MCNC: 8.3 G/DL — LOW (ref 11.5–15.5)
HGB BLD-MCNC: 8.8 G/DL — LOW (ref 11.5–15.5)
MAGNESIUM SERPL-MCNC: 1.7 MG/DL — SIGNIFICANT CHANGE UP (ref 1.6–2.6)
MAGNESIUM SERPL-MCNC: 1.8 MG/DL — SIGNIFICANT CHANGE UP (ref 1.6–2.6)
MCHC RBC-ENTMCNC: 24 PG — LOW (ref 27–34)
MCHC RBC-ENTMCNC: 24.5 PG — LOW (ref 27–34)
MCHC RBC-ENTMCNC: 24.8 PG — LOW (ref 27–34)
MCHC RBC-ENTMCNC: 31.3 GM/DL — LOW (ref 32–36)
MCHC RBC-ENTMCNC: 32.2 GM/DL — SIGNIFICANT CHANGE UP (ref 32–36)
MCHC RBC-ENTMCNC: 32.3 GM/DL — SIGNIFICANT CHANGE UP (ref 32–36)
MCV RBC AUTO: 76.2 FL — LOW (ref 80–100)
MCV RBC AUTO: 76.6 FL — LOW (ref 80–100)
MCV RBC AUTO: 76.8 FL — LOW (ref 80–100)
PHOSPHATE SERPL-MCNC: 5 MG/DL — HIGH (ref 2.5–4.5)
PHOSPHATE SERPL-MCNC: 5.6 MG/DL — HIGH (ref 2.5–4.5)
PHOSPHATE SERPL-MCNC: 6 MG/DL — HIGH (ref 2.5–4.5)
PHOSPHATE SERPL-MCNC: 6.4 MG/DL — HIGH (ref 2.5–4.5)
PLATELET # BLD AUTO: 365 K/UL — SIGNIFICANT CHANGE UP (ref 150–400)
PLATELET # BLD AUTO: 365 K/UL — SIGNIFICANT CHANGE UP (ref 150–400)
PLATELET # BLD AUTO: 396 K/UL — SIGNIFICANT CHANGE UP (ref 150–400)
POTASSIUM SERPL-MCNC: 4 MMOL/L — SIGNIFICANT CHANGE UP (ref 3.5–5.3)
POTASSIUM SERPL-MCNC: 4 MMOL/L — SIGNIFICANT CHANGE UP (ref 3.5–5.3)
POTASSIUM SERPL-MCNC: 4.1 MMOL/L — SIGNIFICANT CHANGE UP (ref 3.5–5.3)
POTASSIUM SERPL-MCNC: 4.5 MMOL/L — SIGNIFICANT CHANGE UP (ref 3.5–5.3)
POTASSIUM SERPL-SCNC: 4 MMOL/L — SIGNIFICANT CHANGE UP (ref 3.5–5.3)
POTASSIUM SERPL-SCNC: 4 MMOL/L — SIGNIFICANT CHANGE UP (ref 3.5–5.3)
POTASSIUM SERPL-SCNC: 4.1 MMOL/L — SIGNIFICANT CHANGE UP (ref 3.5–5.3)
POTASSIUM SERPL-SCNC: 4.5 MMOL/L — SIGNIFICANT CHANGE UP (ref 3.5–5.3)
RBC # BLD: 3.34 M/UL — LOW (ref 3.8–5.2)
RBC # BLD: 3.37 M/UL — LOW (ref 3.8–5.2)
RBC # BLD: 3.69 M/UL — LOW (ref 3.8–5.2)
RBC # FLD: 21 % — HIGH (ref 10.3–14.5)
RBC # FLD: 21 % — HIGH (ref 10.3–14.5)
RBC # FLD: 21.4 % — HIGH (ref 10.3–14.5)
SODIUM SERPL-SCNC: 142 MMOL/L — SIGNIFICANT CHANGE UP (ref 135–145)
SODIUM SERPL-SCNC: 143 MMOL/L — SIGNIFICANT CHANGE UP (ref 135–145)
SODIUM SERPL-SCNC: 144 MMOL/L — SIGNIFICANT CHANGE UP (ref 135–145)
SODIUM SERPL-SCNC: 144 MMOL/L — SIGNIFICANT CHANGE UP (ref 135–145)
WBC # BLD: 17.6 K/UL — HIGH (ref 3.8–10.5)
WBC # BLD: 18.5 K/UL — HIGH (ref 3.8–10.5)
WBC # BLD: 22.7 K/UL — HIGH (ref 3.8–10.5)
WBC # FLD AUTO: 17.6 K/UL — HIGH (ref 3.8–10.5)
WBC # FLD AUTO: 18.5 K/UL — HIGH (ref 3.8–10.5)
WBC # FLD AUTO: 22.7 K/UL — HIGH (ref 3.8–10.5)

## 2017-09-04 PROCEDURE — 99253 IP/OBS CNSLTJ NEW/EST LOW 45: CPT

## 2017-09-04 PROCEDURE — 99223 1ST HOSP IP/OBS HIGH 75: CPT | Mod: GC

## 2017-09-04 PROCEDURE — 99233 SBSQ HOSP IP/OBS HIGH 50: CPT | Mod: GC

## 2017-09-04 RX ORDER — ONDANSETRON 8 MG/1
4 TABLET, FILM COATED ORAL EVERY 6 HOURS
Qty: 0 | Refills: 0 | Status: COMPLETED | OUTPATIENT
Start: 2017-09-04 | End: 2017-09-04

## 2017-09-04 RX ORDER — AMLODIPINE BESYLATE 2.5 MG/1
10 TABLET ORAL DAILY
Qty: 0 | Refills: 0 | Status: DISCONTINUED | OUTPATIENT
Start: 2017-09-04 | End: 2017-09-16

## 2017-09-04 RX ORDER — ACETAMINOPHEN 500 MG
650 TABLET ORAL EVERY 6 HOURS
Qty: 0 | Refills: 0 | Status: DISCONTINUED | OUTPATIENT
Start: 2017-09-04 | End: 2017-09-18

## 2017-09-04 RX ORDER — SIMETHICONE 80 MG/1
80 TABLET, CHEWABLE ORAL ONCE
Qty: 0 | Refills: 0 | Status: COMPLETED | OUTPATIENT
Start: 2017-09-04 | End: 2017-09-04

## 2017-09-04 RX ORDER — DOCUSATE SODIUM 100 MG
100 CAPSULE ORAL
Qty: 0 | Refills: 0 | Status: DISCONTINUED | OUTPATIENT
Start: 2017-09-04 | End: 2017-09-18

## 2017-09-04 RX ORDER — SENNA PLUS 8.6 MG/1
2 TABLET ORAL AT BEDTIME
Qty: 0 | Refills: 0 | Status: DISCONTINUED | OUTPATIENT
Start: 2017-09-04 | End: 2017-09-12

## 2017-09-04 RX ADMIN — ONDANSETRON 4 MILLIGRAM(S): 8 TABLET, FILM COATED ORAL at 20:15

## 2017-09-04 RX ADMIN — SIMETHICONE 80 MILLIGRAM(S): 80 TABLET, CHEWABLE ORAL at 11:14

## 2017-09-04 RX ADMIN — AMLODIPINE BESYLATE 10 MILLIGRAM(S): 2.5 TABLET ORAL at 11:14

## 2017-09-04 RX ADMIN — FENTANYL CITRATE 25 MICROGRAM(S): 50 INJECTION INTRAVENOUS at 07:45

## 2017-09-04 RX ADMIN — Medication 100 MILLIGRAM(S): at 11:14

## 2017-09-04 RX ADMIN — FENTANYL CITRATE 25 MICROGRAM(S): 50 INJECTION INTRAVENOUS at 08:00

## 2017-09-04 RX ADMIN — SENNA PLUS 2 TABLET(S): 8.6 TABLET ORAL at 21:46

## 2017-09-04 RX ADMIN — Medication 650 MILLIGRAM(S): at 17:23

## 2017-09-04 RX ADMIN — Medication 650 MILLIGRAM(S): at 18:00

## 2017-09-04 NOTE — PROGRESS NOTE ADULT - ASSESSMENT
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    GI: peritonitis  -fluid shows large RBC + WBC count, negative gram stain  -is s/p vanc + ceftriaxone-->cefepime, ordered q 48 hours, last dose 9/3 at 9 pm  -as per trauma sx, no acute intervention presently    Heme  -anemia 2/2 chronic disease + blood loss  -Hgb remains stable  -transfuse for symptomatic anemia or Hgb <7.0    ID  -fluid cell count, gram stain, cultures pending    Renal  -ESRD 2/2 GN  -on PD, holding given hemorrhagic peritoneum and possible peritonitis  -patient was consented for renal replacement therapy  -appreciate renal recs; no need for HD presently  -monitor electrolytes, phos, calcium    KODI Henson M.D.   PGY-1  pager #[phone] 58 yo F PMH HTN, PCV, ESRD on PD (2/2 chronic GN), admitted w/ hemoperitoneum, still with swollen, tender abdomen.    Neuro:   -AO x 3  -pain control w/ fentanyl PRN  -ordered for neuro checks q 4    CV:  -patient w/ CP all day yesterday, described as squeezing in nature, lasting minutes, states it is improved today    Pulm:  -no issues    GI: peritonitis  -fluid shows large RBC + WBC count, negative gram stain  -is s/p vanc + ceftriaxone-->cefepime, ordered q 48 hours, last dose 9/3 at 9 pm  -as per trauma sx, no acute intervention presently,appreciate surgery recs: possible etiologies are traction on PD catheter insertion site, splenic injury or herald bleed from undiagnosed mesenteric artery aneurysm; need CTA abdomen to dx aneurysm, IV contrast for splenic injury  -clear liquid diet    Heme  -anemia 2/2 chronic disease + blood loss  -Hgb remains stable  -transfuse for symptomatic anemia or Hgb <7.0    ID  -fluid cell count, gram stain, cultures pending  -c/w cefepime q 48 hours    Renal  -ESRD 2/2 GN  -on PD, holding given hemorrhagic peritoneum and possible peritonitis  -patient was consented for renal replacement therapy  -appreciate renal recs; no need for HD presently  -monitor electrolytes, phos, calcium    KODI Henson M.D.   PGY-1  pager #[phone] 58 yo F PMH HTN, PCV, ESRD on PD (2/2 chronic GN), admitted w/ hemoperitoneum, still with swollen, tender abdomen.    Neuro:   -AO x 3  -pain control w/ fentanyl PRN  -ordered for neuro checks q 4    CV:  -patient w/ CP all day yesterday, described as squeezing in nature, lasting minutes, states it is improved today  -hypertensive to 's, will re-start on home dose amlodipine.    Pulm:  -no issues    GI: peritonitis  -currently no evidence of bleeding, CBC stable Hgb 8.2  fluid shows large RBC + WBC count, negative gram stain  -is s/p vanc + ceftriaxone-->cefepime, ordered q 48 hours, last dose 9/3 at 9 pm  -as per trauma sx, no acute intervention presently,appreciate surgery recs: possible etiologies are traction on PD catheter insertion site, splenic injury or herald bleed from undiagnosed mesenteric artery aneurysm; need CTA abdomen to dx aneurysm, IV contrast for splenic injury  -clear liquid diet    Heme  -anemia 2/2 chronic disease + blood loss  -Hgb remains stable  -transfuse for symptomatic anemia or Hgb <7.0    ID  -fluid cell count, gram stain, cultures pending  -c/w cefepime q 48 hours    Renal  -ESRD 2/2 GN  -on PD, holding given hemorrhagic peritoneum and possible peritonitis  -patient was consented for renal replacement therapy  -appreciate renal recs; no need for HD presently  -monitor electrolytes, phos, calcium  -patient would like to d/c topete and trial void today.     KODI Henson M.D.   PGY-1  pager #[phone] 56 yo F PMH HTN, PCV, ESRD on PD (2/2 chronic GN), admitted w/ hemoperitoneum, still with swollen, tender abdomen.    Neuro:   -AO x 3  -pain control w/ fentanyl PRN  -ordered for neuro checks q 4    CV:  -patient w/ CP all day yesterday, described as squeezing in nature, lasting minutes, states it is improved today  -hypertensive to 's, will re-start on home dose amlodipine.    Pulm:  -no issues    GI: peritonitis  -currently no evidence of bleeding, CBC stable Hgb 8.2  fluid shows large RBC + WBC count, negative gram stain  -is s/p vanc + ceftriaxone-->cefepime, ordered q 48 hours, last dose 9/3 at 9 pm  -as per trauma sx, no acute intervention presently,appreciate surgery recs: possible etiologies are traction on PD catheter insertion site, splenic injury or herald bleed from undiagnosed mesenteric artery aneurysm; need CTA abdomen to dx aneurysm, IV contrast for splenic injury  -clear liquid diet  -constipation: start senna + colace  -gas pain: start simethicone    Heme  -anemia 2/2 chronic disease + blood loss  -Hgb remains stable  -transfuse for symptomatic anemia or Hgb <7.0    ID  -fluid cell count, gram stain, cultures pending  -c/w cefepime q 48 hours    Renal  -ESRD 2/2 GN  -on PD, holding given hemorrhagic peritoneum and possible peritonitis  -patient was consented for renal replacement therapy  -appreciate renal recs; no need for HD presently  -monitor electrolytes, phos, calcium  -patient would like to d/c topete and trial void today.     KODI Henson M.D.   PGY-1  pager #[phone]

## 2017-09-04 NOTE — CHART NOTE - NSCHARTNOTEFT_GEN_A_CORE
MICU Transfer Note    Transfer from: MICU    Transfer to: (  X) Medicine    (  ) Telemetry     (   ) RCU        (    ) Palliative         (   ) Stroke Unit          (   ) __________________    Accepting physician: Dr Dalton Lew      HPI: This is a 58 yo F PMH HTN, Polycythemia Vera on Hydroxyurea, and ESRD on peritoneal dialysis sp catheter placement in August 2017 transferred from [facility] for further management of peritonitis/hemoperitoneum.  Patient presented to OSH c/o abdominal tenderness and multiple syncopal episodes.  Patient reports she was at home last night.  From 1-6pm she had indwelling peritoneal fluid, and it drained clear.  At 11pm she was having dessert when she began to experience extreme abdominal pain. The pain was described as constant, stabbing pain, 10/10 that was worse with movement.  She reports becoming weak and almost losing consciousness.  Later that evening, patient reports a second episode of syncope while having a bowel movement.  She and her family noted blood in the peritoneal catheter at that time.  At that time, her family called EMS and she was taken to [facility].  At [facility], patient hypotensive to 90s requiring 1.5L IVF for fluid resuscitation. CT Abdomen/Pelvis showing: moderate amount of fluid in the abdomen and pelvis with collections of hematoma/hemorrhage, largest collections in the pelvis. Peritoneal fluid sent for analysis showing large red cell count (3,139,000) and white blood cell count (7478), negative gram stain.  Initial Hgb 10.4 dropped to 7.6 requiring transfusion of one unit of PRBC.  Patient also noted to have leukocytosis to 31.8 treated with Vancomycin and Ceftriaxone x 1, then continued on Cefepime for possible SBP.    As patient's nephrologist is located at [facility] and patient receives most of her care at [facility], patient was transferred to [facility] per family request and for possible continued PD.        MICU COURSE:           ASSESSMENT & PLAN:             For Followup:          Vital Signs Last 24 Hrs  T(C): 37.4 (04 Sep 2017 20:00), Max: 37.9 (04 Sep 2017 16:00)  T(F): 99.4 (04 Sep 2017 20:00), Max: 100.3 (04 Sep 2017 16:00)  HR: 101 (04 Sep 2017 19:00) (93 - 119)  BP: 140/74 (04 Sep 2017 19:00) (127/71 - 154/71)  BP(mean): 101 (04 Sep 2017 19:00) (92 - 111)  RR: 20 (04 Sep 2017 19:00) (18 - 25)  SpO2: 95% (04 Sep 2017 19:00) (93% - 97%)  I&O's Summary    03 Sep 2017 07:01  -  04 Sep 2017 07:00  --------------------------------------------------------  IN: 500 mL / OUT: 1300 mL / NET: -800 mL    04 Sep 2017 07:01  -  04 Sep 2017 22:18  --------------------------------------------------------  IN: 1360 mL / OUT: 1310 mL / NET: 50 mL        MEDICATIONS  (STANDING):  cefepime  IVPB   IV Intermittent   dextrose 5% 1000 milliLiter(s) (50 mL/Hr) IV Continuous <Continuous>  amLODIPine   Tablet 10 milliGRAM(s) Oral daily  senna 2 Tablet(s) Oral at bedtime  docusate sodium 100 milliGRAM(s) Oral two times a day    MEDICATIONS  (PRN):  fentaNYL    Injectable 25 MICROGram(s) IV Push every 4 hours PRN Moderate Pain (4 - 6)  acetaminophen   Tablet. 650 milliGRAM(s) Oral every 6 hours PRN headache        LABS                                            8.8                   Neurophils% (auto):   x      (09-04 @ 16:43):    22.7 )-----------(396          Lymphocytes% (auto):  x                                             28.2                   Eosinphils% (auto):   x        Manual%: Neutrophils x    ; Lymphocytes x    ; Eosinophils x    ; Bands%: x    ; Blasts x                                    142    |  104    |  44                  Calcium: 7.3   / iCa: x      (09-04 @ 16:43)    ----------------------------<  125       Magnesium: 1.7                              4.0     |  22     |  4.52             Phosphorous: 5.0 MICU Transfer Note    Transfer from: MICU    Transfer to: (  X) Medicine    (  ) Telemetry     (   ) RCU        (    ) Palliative         (   ) Stroke Unit          (   ) __________________    Accepting physician: Dr Dalton Lew      HPI: This is a 56 yo F PMH HTN, Polycythemia Vera on Hydroxyurea, and ESRD on peritoneal dialysis sp catheter placement in August 2017 transferred from [facility] for further management of peritonitis/hemoperitoneum.  Patient presented to OSH c/o abdominal tenderness and multiple syncopal episodes.  Patient reports she was at home last night.  From 1-6pm she had indwelling peritoneal fluid, and it drained clear.  At 11pm she was having dessert when she began to experience extreme abdominal pain. The pain was described as constant, stabbing pain, 10/10 that was worse with movement.  She reports becoming weak and almost losing consciousness.  Later that evening, patient reports a second episode of syncope while having a bowel movement.  She and her family noted blood in the peritoneal catheter at that time.  At that time, her family called EMS and she was taken to [facility].  At [facility], patient hypotensive to 90s requiring 1.5L IVF for fluid resuscitation. CT Abdomen/Pelvis showing: moderate amount of fluid in the abdomen and pelvis with collections of hematoma/hemorrhage, largest collections in the pelvis. Peritoneal fluid sent for analysis showing large red cell count (3,139,000) and white blood cell count (7478), negative gram stain.  Initial Hgb 10.4 dropped to 7.6 requiring transfusion of one unit of PRBC.  Patient also noted to have leukocytosis to 31.8 treated with Vancomycin and Ceftriaxone x 1, then continued on Cefepime for possible SBP.    As patient's nephrologist is located at [facility] and patient receives most of her care at [facility], patient was transferred to [facility] per family request and for possible continued PD.    MICU COURSE:   Pt admit to MICU on 9/3. Pt received IVF and 1U RBC.  Hemoglobin was monitored and kept above 8. Renal was consulted. Surgery was consulted. Pt was started on empiric antibiotics.     .  Will consult renal ([facility]) and surgery for guidance regarding management of this peritoneal bleed.  Empiric abx while we are ruling out bacterial peritonitis, will hold off on peritoneal abx for now given negative gram stain, and also that peritoneal white count is approximately normal when corrected for red blood cell count..             ASSESSMENT & PLAN:             For Followup:          Vital Signs Last 24 Hrs  T(C): 37.4 (04 Sep 2017 20:00), Max: 37.9 (04 Sep 2017 16:00)  T(F): 99.4 (04 Sep 2017 20:00), Max: 100.3 (04 Sep 2017 16:00)  HR: 101 (04 Sep 2017 19:00) (93 - 119)  BP: 140/74 (04 Sep 2017 19:00) (127/71 - 154/71)  BP(mean): 101 (04 Sep 2017 19:00) (92 - 111)  RR: 20 (04 Sep 2017 19:00) (18 - 25)  SpO2: 95% (04 Sep 2017 19:00) (93% - 97%)  I&O's Summary    03 Sep 2017 07:01  -  04 Sep 2017 07:00  --------------------------------------------------------  IN: 500 mL / OUT: 1300 mL / NET: -800 mL    04 Sep 2017 07:01  -  04 Sep 2017 22:18  --------------------------------------------------------  IN: 1360 mL / OUT: 1310 mL / NET: 50 mL        MEDICATIONS  (STANDING):  cefepime  IVPB   IV Intermittent   dextrose 5% 1000 milliLiter(s) (50 mL/Hr) IV Continuous <Continuous>  amLODIPine   Tablet 10 milliGRAM(s) Oral daily  senna 2 Tablet(s) Oral at bedtime  docusate sodium 100 milliGRAM(s) Oral two times a day    MEDICATIONS  (PRN):  fentaNYL    Injectable 25 MICROGram(s) IV Push every 4 hours PRN Moderate Pain (4 - 6)  acetaminophen   Tablet. 650 milliGRAM(s) Oral every 6 hours PRN headache        LABS                                            8.8                   Neurophils% (auto):   x      (09-04 @ 16:43):    22.7 )-----------(396          Lymphocytes% (auto):  x                                             28.2                   Eosinphils% (auto):   x        Manual%: Neutrophils x    ; Lymphocytes x    ; Eosinophils x    ; Bands%: x    ; Blasts x                                    142    |  104    |  44                  Calcium: 7.3   / iCa: x      (09-04 @ 16:43)    ----------------------------<  125       Magnesium: 1.7                              4.0     |  22     |  4.52             Phosphorous: 5.0 MICU Transfer Note    Transfer from: MICU    Transfer to: (  X) Medicine    (  ) Telemetry     (   ) RCU        (    ) Palliative         (   ) Stroke Unit          (   ) __________________    Accepting physician: Dr Dalton eLw      HPI: This is a 58 yo F PMH HTN, Polycythemia Vera on Hydroxyurea, and ESRD on peritoneal dialysis sp catheter placement in August 2017 transferred from [facility] for further management of peritonitis/hemoperitoneum.  Patient presented to OSH c/o abdominal tenderness and multiple syncopal episodes.  Patient reports she was at home last night.  From 1-6pm she had indwelling peritoneal fluid, and it drained clear.  At 11pm she was having dessert when she began to experience extreme abdominal pain. The pain was described as constant, stabbing pain, 10/10 that was worse with movement.  She reports becoming weak and almost losing consciousness.  Later that evening, patient reports a second episode of syncope while having a bowel movement.  She and her family noted blood in the peritoneal catheter at that time.  At that time, her family called EMS and she was taken to [facility].  At [facility], patient hypotensive to 90s requiring 1.5L IVF for fluid resuscitation. CT Abdomen/Pelvis showing: moderate amount of fluid in the abdomen and pelvis with collections of hematoma/hemorrhage, largest collections in the pelvis. Peritoneal fluid sent for analysis showing large red cell count (3,139,000) and white blood cell count (7478), negative gram stain.  Initial Hgb 10.4 dropped to 7.6 requiring transfusion of one unit of PRBC.  Patient also noted to have leukocytosis to 31.8 treated with Vancomycin and Ceftriaxone x 1, then continued on Cefepime for possible SBP.    As patient's nephrologist is located at [facility] and patient receives most of her care at [facility], patient was transferred to [facility] per family request and for possible continued PD.    MICU COURSE:   Pt admit to MICU on 9/3. Pt received IVF and 1U RBC.  Hemoglobin was monitored and kept above 8. Renal was consulted. Surgery was consulted. Pt was started on empiric antibiotics. Peritoneal fluid showed fluid shows large RBC + WBC count, negative gram stain. Surgery advised no intervention at this time. Renal advised no HD at this time and would like to monitor day to day. The patient had the topete removed and is spontaneously voiding.  Bicarb drip was started in order to supplement home medication. on 9/4, the patient had a T100.4, and received 1g Ofirmev, blood cultures were sent. The patient remained HD stable without vasopressor support during admission. The patient was medically optimized for transfer to the floor.         ASSESSMENT & PLAN:   58 yo F PMH HTN, PCV, ESRD on PD (2/2 chronic GN), admitted w/ hemoperitoneum, still with swollen, tender abdomen.      Neuro:   -AO x 3  -pain control w/ fentanyl PRN    CV:  -hypertensive to 's, re-started on home dose amlodipine.    Pulm:  -no issues    GI: peritonitis  -currently no evidence of bleeding, CBC stable Hgb 8.2  - fluid shows large RBC + WBC count, negative gram stain  -icefepime, q 48 hours  -as per trauma sx, no acute intervention presently,appreciate surgery recs: possible etiologies are traction on PD catheter insertion site, splenic injury or herald bleed from undiagnosed mesenteric artery aneurysm; need CTA abdomen to dx aneurysm, IV contrast for splenic injury  -clear liquid diet  -constipation: start senna + colace  -gas pain: start simethicone    Heme  -anemia 2/2 chronic disease + blood loss  -Hgb remains stable  -transfuse for symptomatic anemia or Hgb <7.0    ID  -fluid cell count, gram stain, cultures pending  -c/w cefepime q 48 hours    Renal  -ESRD 2/2 GN  -on PD, holding given hemorrhagic peritoneum and possible peritonitis  -patient was consented for renal replacement therapy  -appreciate renal recs; no need for HD presently  -monitor electrolytes, phos, calcium  - spontaneously voiding        For Followup:  - Trend hg q8hr, maintain hg>7  - trend cmp, mg, phosphorus q8hr  - c/w Cefepime   - f/u Renal recommendations  - f/u cultures of peritoneal fluids             Vital Signs Last 24 Hrs  T(C): 37.4 (04 Sep 2017 20:00), Max: 37.9 (04 Sep 2017 16:00)  T(F): 99.4 (04 Sep 2017 20:00), Max: 100.3 (04 Sep 2017 16:00)  HR: 101 (04 Sep 2017 19:00) (93 - 119)  BP: 140/74 (04 Sep 2017 19:00) (127/71 - 154/71)  BP(mean): 101 (04 Sep 2017 19:00) (92 - 111)  RR: 20 (04 Sep 2017 19:00) (18 - 25)  SpO2: 95% (04 Sep 2017 19:00) (93% - 97%)  I&O's Summary    03 Sep 2017 07:01  -  04 Sep 2017 07:00  --------------------------------------------------------  IN: 500 mL / OUT: 1300 mL / NET: -800 mL    04 Sep 2017 07:01  -  04 Sep 2017 22:18  --------------------------------------------------------  IN: 1360 mL / OUT: 1310 mL / NET: 50 mL        MEDICATIONS  (STANDING):  cefepime  IVPB   IV Intermittent   dextrose 5% 1000 milliLiter(s) (50 mL/Hr) IV Continuous <Continuous>  amLODIPine   Tablet 10 milliGRAM(s) Oral daily  senna 2 Tablet(s) Oral at bedtime  docusate sodium 100 milliGRAM(s) Oral two times a day    MEDICATIONS  (PRN):  fentaNYL    Injectable 25 MICROGram(s) IV Push every 4 hours PRN Moderate Pain (4 - 6)  acetaminophen   Tablet. 650 milliGRAM(s) Oral every 6 hours PRN headache        LABS                                            8.8                   Neurophils% (auto):   x      (09-04 @ 16:43):    22.7 )-----------(396          Lymphocytes% (auto):  x                                             28.2                   Eosinphils% (auto):   x        Manual%: Neutrophils x    ; Lymphocytes x    ; Eosinophils x    ; Bands%: x    ; Blasts x                                    142    |  104    |  44                  Calcium: 7.3   / iCa: x      (09-04 @ 16:43)    ----------------------------<  125       Magnesium: 1.7                              4.0     |  22     |  4.52             Phosphorous: 5.0

## 2017-09-04 NOTE — CONSULT NOTE ADULT - SUBJECTIVE AND OBJECTIVE BOX
[facility] DIVISION OF KIDNEY DISEASES AND HYPERTENSION -- INITIAL CONSULT NOTE  --------------------------------------------------------------------------------  HPI:  56 yo F PMH HTN, Polycythemia Vera on Hydroxyurea, and ESRD on peritoneal dialysis 2/2 chronic GN sp catheter placement in August 2017 transferred from [facility] for further management of peritonitis/hemoperitoneum.  Patient presented to OSH c/o abdominal tenderness that began suddenly 5-6 hours after while on PD. She began PD on saturday 6 pm w/o complication intially. She had multiple syncopal episodes. On 9/2 between 1-6pm she had indwelling peritoneal fluid, and it drained clear initially She reports becoming weak and almost losing consciousness.  Later that evening, patient reports a second episode of syncope while having a bowel movement.  She and her family noted blood in the peritoneal catheter at that time. She was taken to [facility].    At [facility], patient hypotensive to 90s requiring 1.5L IVF for fluid resuscitation. CT Abdomen/Pelvis showing: moderate amount of fluid in the abdomen and pelvis with collections of hematoma/hemorrhage, largest collections in the pelvis. Peritoneal fluid sent for analysis showing large red cell count (3,139,000) and white blood cell count (7478), negative gram stain.  Initial Hgb 10.4 dropped to 7.6 requiring transfusion of one unit of PRBC.  Patient also noted to have leukocytosis to 31.8 treated with Vancomycin and Ceftriaxone x 1, then continued on Cefepime for possible SBP.    Of note patient has not had menstruations for 10 years. Denied any vaginal bleeding. 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  Splenomegaly: 2015  Hypertension  Peptic ulcer disease  Polycythemia vera  Peritoneal dialysis catheter in place: Placed 8/9/2017    FAMILY HISTORY:  Family history of malignant neoplasm (Grandparent)  Family history of hypertension in mother    PAST SOCIAL HISTORY:    ALLERGIES & MEDICATIONS  --------------------------------------------------------------------------------  Allergies    No Known Allergies    Intolerances      Standing Inpatient Medications  cefepime  IVPB   IV Intermittent   dextrose 5% 1000 milliLiter(s) IV Continuous <Continuous>    PRN Inpatient Medications  fentaNYL    Injectable 25 MICROGram(s) IV Push every 4 hours PRN      REVIEW OF SYSTEMS  --------------------------------------------------------------------------------  Constitutional: [ ] Fever [ ] Chills [x ] Fatigue [ ] Weight change   HEENT: [ ] Blurred vision [ ] Eye Pain [x ] Headache [ ] Runny nose [ ] Sore Throat   Respiratory: [ ] Cough [ ] Wheezing [ ] Shortness of breath  Cardiovascular: [ ] Chest Pain [ ] Palpitations [ ] SANTAMARIA [ ] PND [ ] Orthopnea  Gastrointestinal: [x ] Abdominal Pain [ ] Diarrhea [ ] Constipation [ ] Hemorrhoids [ ] Nausea [ ] Vomiting  Genitourinary: [ ] Nocturia [ ] Dysuria [ ] Incontinence  Extremities: [ ] Swelling [ ] Joint Pain  Neurologic: [ ] Focal deficit [ ] Paresthesias [ ] Syncope  Lymphatic: [ ] Swelling [ ] Lymphadenopathy   Skin: [ ] Rash [ ] Ecchymoses [ ] Wounds [ ] Lesions  Psychiatry: [ ] Depression [ ] Suicidal/Homicidal Ideation [ ] Anxiety [ ] Sleep Disturbances  [x ] 10 point review of systems is otherwise negative except as mentioned above            [ ]Unable to obtain      All other systems were reviewed and are negative, except as noted.    VITALS/PHYSICAL EXAM  --------------------------------------------------------------------------------  T(C): 37 (09-04-17 @ 04:00), Max: 37.1 (09-03-17 @ 18:45)  HR: 106 (09-04-17 @ 05:00) (88 - 106)  BP: 144/77 (09-04-17 @ 05:00) (127/71 - 154/71)  RR: 20 (09-04-17 @ 05:00) (18 - 31)  SpO2: 94% (09-04-17 @ 05:00) (93% - 98%)  Wt(kg): --  Height (cm): 157.48 (09-03-17 @ 18:45)  Weight (kg): 56.7 (09-03-17 @ 18:45)  BMI (kg/m2): 22.9 (09-03-17 @ 18:45)  BSA (m2): 1.57 (09-03-17 @ 18:45)      09-03-17 @ 07:01  -  09-04-17 @ 07:00  --------------------------------------------------------  IN: 500 mL / OUT: 1300 mL / NET: -800 mL      Physical Exam:  	Gen: NAD,   	HEENT: PERRL  	Pulm: CTA B/L  	CV: RRR,   	Back: no sacral edema  	Abd: soft, tender, nondistended,   	: efrem  	LE: Warm, no edema  	Neuro: no asterixis  	Skin: Warm, without rashes  	Vascular access:PD catherter in place with blood in line      LABS/STUDIES  --------------------------------------------------------------------------------              8.3    17.6  >-----------<  365      [09-04-17 @ 04:11]              25.6     144  |  109  |  49  ----------------------------<  115      [09-04-17 @ 04:11]  4.0   |  20  |  4.91        Ca     7.3     [09-04-17 @ 04:11]      Mg     1.8     [09-04-17 @ 04:11]      Phos  6.0     [09-04-17 @ 04:11]    TPro  5.5  /  Alb  3.0  /  TBili  0.9  /  DBili  x   /  AST  11  /  ALT  6   /  AlkPhos  116  [09-03-17 @ 19:53]    PT/INR: PT 13.3 , INR 1.22       [09-03-17 @ 19:53]  PTT: 33.1       [09-03-17 @ 19:53]      Creatinine Trend:  SCr 4.91 [09-04 @ 04:11]  SCr 4.95 [09-03 @ 23:40]  SCr 4.92 [09-03 @ 19:53]  SCr 5.07 [08-10 @ 05:30]  SCr 5.23 [08-09 @ 20:37]    Urinalysis - [09-03-17 @ 22:23]      Color  / Appearance Clear / SG 1.010 / pH 6.5      Gluc Trace / Ketone Negative  / Bili Negative / Urobili Negative       Blood Negative / Protein 100 / Leuk Est Negative / Nitrite Negative      RBC 0-2 / WBC 2-5 / Hyaline  / Gran  / Sq Epi  / Non Sq Epi  / Bacteria Few      HbA1c 5.7      [01-18-17 @ 07:32]  TSH 1.15      [01-18-17 @ 07:32]  Lipid: chol 95, , HDL 31, LDL 42      [10-04-16 @ 07:23]    HBsAb Nonreact      [01-19-17 @ 20:01]  HBsAg Nonreact      [01-19-17 @ 20:01]  HBcAb Nonreact      [02-02-17 @ 23:23]  HCV 0.20, Nonreact      [02-02-17 @ 23:23]  HIV Nonreact      [01-19-17 @ 20:01]    RAHUL: titer 1:80, pattern Speckled      [01-19-17 @ 20:01]  C3 Complement 100      [01-19-17 @ 20:01]  C4 Complement 32      [01-19-17 @ 20:01]  Rheumatoid Factor <7.0      [01-19-17 @ 20:01]  ANCA: cANCA Negative, pANCA Negative, atypical ANCA Negative      [01-19-17 @ 20:01]  anti-GBM <0.2      [01-20-17 @ 01:52]  ASLO 59      [01-19-17 @ 20:01]  Syphilis Screen (Treponema Pallidum Ab) Negative      [01-19-17 @ 20:01]  Free Light Chains: kappa 12.00, lambda 14.10, ratio = 0.85      [01-23 @ 14:39] [facility] DIVISION OF KIDNEY DISEASES AND HYPERTENSION -- INITIAL CONSULT NOTE  --------------------------------------------------------------------------------  HPI:  56 yo F PMH HTN, Polycythemia Vera on Hydroxyurea, and ESRD on peritoneal dialysis 2/2 chronic GN sp catheter placement in August 2017 transferred from [facility] for further management of peritonitis/hemoperitoneum.  Patient presented to OSH c/o abdominal tenderness that began suddenly 5-6 hours after PD. She began PD on saturday 6 pm w/o complication intially. She had multiple syncopal episodes. On 9/2 between 1-6pm she had indwelling peritoneal fluid, and it drained clear initially She reports becoming weak and almost losing consciousness.  Later that evening, patient reports a second episode of syncope while having a bowel movement.  She and her family noted blood in the peritoneal catheter at that time. She was taken to [facility].    At [facility], patient hypotensive to 90s requiring 1.5L IVF for fluid resuscitation. CT Abdomen/Pelvis showing: moderate amount of fluid in the abdomen and pelvis with collections of hematoma/hemorrhage, largest collections in the pelvis. Peritoneal fluid sent for analysis showing large red cell count (3,139,000) and white blood cell count (7478), negative gram stain.  Initial Hgb 10.4 dropped to 7.6 requiring transfusion of one unit of PRBC.  Patient also noted to have leukocytosis to 31.8 treated with Vancomycin and Ceftriaxone x 1, then continued on Cefepime for possible SBP.    Of note patient has not had menstruations for 10 years. Denied any vaginal bleeding. 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  Splenomegaly: 2015  Hypertension  Peptic ulcer disease  Polycythemia vera  Peritoneal dialysis catheter in place: Placed 8/9/2017    FAMILY HISTORY:  Family history of malignant neoplasm (Grandparent)  Family history of hypertension in mother    PAST SOCIAL HISTORY:    ALLERGIES & MEDICATIONS  --------------------------------------------------------------------------------  Allergies    No Known Allergies    Intolerances      Standing Inpatient Medications  cefepime  IVPB   IV Intermittent   dextrose 5% 1000 milliLiter(s) IV Continuous <Continuous>    PRN Inpatient Medications  fentaNYL    Injectable 25 MICROGram(s) IV Push every 4 hours PRN      REVIEW OF SYSTEMS  --------------------------------------------------------------------------------  Constitutional: [ ] Fever [ ] Chills [x ] Fatigue [ ] Weight change   HEENT: [ ] Blurred vision [ ] Eye Pain [x ] Headache [ ] Runny nose [ ] Sore Throat   Respiratory: [ ] Cough [ ] Wheezing [ ] Shortness of breath  Cardiovascular: [ ] Chest Pain [ ] Palpitations [ ] SANTAMARIA [ ] PND [ ] Orthopnea  Gastrointestinal: [x ] Abdominal Pain [ ] Diarrhea [ ] Constipation [ ] Hemorrhoids [ ] Nausea [ ] Vomiting  Genitourinary: [ ] Nocturia [ ] Dysuria [ ] Incontinence  Extremities: [ ] Swelling [ ] Joint Pain  Neurologic: [ ] Focal deficit [ ] Paresthesias [ ] Syncope  Lymphatic: [ ] Swelling [ ] Lymphadenopathy   Skin: [ ] Rash [ ] Ecchymoses [ ] Wounds [ ] Lesions  Psychiatry: [ ] Depression [ ] Suicidal/Homicidal Ideation [ ] Anxiety [ ] Sleep Disturbances  [x ] 10 point review of systems is otherwise negative except as mentioned above            [ ]Unable to obtain      All other systems were reviewed and are negative, except as noted.    VITALS/PHYSICAL EXAM  --------------------------------------------------------------------------------  T(C): 37 (09-04-17 @ 04:00), Max: 37.1 (09-03-17 @ 18:45)  HR: 106 (09-04-17 @ 05:00) (88 - 106)  BP: 144/77 (09-04-17 @ 05:00) (127/71 - 154/71)  RR: 20 (09-04-17 @ 05:00) (18 - 31)  SpO2: 94% (09-04-17 @ 05:00) (93% - 98%)  Wt(kg): --  Height (cm): 157.48 (09-03-17 @ 18:45)  Weight (kg): 56.7 (09-03-17 @ 18:45)  BMI (kg/m2): 22.9 (09-03-17 @ 18:45)  BSA (m2): 1.57 (09-03-17 @ 18:45)      09-03-17 @ 07:01  -  09-04-17 @ 07:00  --------------------------------------------------------  IN: 500 mL / OUT: 1300 mL / NET: -800 mL      Physical Exam:  	Gen: NAD,   	HEENT: PERRL  	Pulm: CTA B/L  	CV: RRR,   	Back: no sacral edema  	Abd: soft, tender, nondistended,   	: efrem  	LE: Warm, no edema  	Neuro: no asterixis  	Skin: Warm, without rashes  	Vascular access:PD catherter in place with blood in line      LABS/STUDIES  --------------------------------------------------------------------------------              8.3    17.6  >-----------<  365      [09-04-17 @ 04:11]              25.6     144  |  109  |  49  ----------------------------<  115      [09-04-17 @ 04:11]  4.0   |  20  |  4.91        Ca     7.3     [09-04-17 @ 04:11]      Mg     1.8     [09-04-17 @ 04:11]      Phos  6.0     [09-04-17 @ 04:11]    TPro  5.5  /  Alb  3.0  /  TBili  0.9  /  DBili  x   /  AST  11  /  ALT  6   /  AlkPhos  116  [09-03-17 @ 19:53]    PT/INR: PT 13.3 , INR 1.22       [09-03-17 @ 19:53]  PTT: 33.1       [09-03-17 @ 19:53]      Creatinine Trend:  SCr 4.91 [09-04 @ 04:11]  SCr 4.95 [09-03 @ 23:40]  SCr 4.92 [09-03 @ 19:53]  SCr 5.07 [08-10 @ 05:30]  SCr 5.23 [08-09 @ 20:37]    Urinalysis - [09-03-17 @ 22:23]      Color  / Appearance Clear / SG 1.010 / pH 6.5      Gluc Trace / Ketone Negative  / Bili Negative / Urobili Negative       Blood Negative / Protein 100 / Leuk Est Negative / Nitrite Negative      RBC 0-2 / WBC 2-5 / Hyaline  / Gran  / Sq Epi  / Non Sq Epi  / Bacteria Few      HbA1c 5.7      [01-18-17 @ 07:32]  TSH 1.15      [01-18-17 @ 07:32]  Lipid: chol 95, , HDL 31, LDL 42      [10-04-16 @ 07:23]    HBsAb Nonreact      [01-19-17 @ 20:01]  HBsAg Nonreact      [01-19-17 @ 20:01]  HBcAb Nonreact      [02-02-17 @ 23:23]  HCV 0.20, Nonreact      [02-02-17 @ 23:23]  HIV Nonreact      [01-19-17 @ 20:01]    RAHUL: titer 1:80, pattern Speckled      [01-19-17 @ 20:01]  C3 Complement 100      [01-19-17 @ 20:01]  C4 Complement 32      [01-19-17 @ 20:01]  Rheumatoid Factor <7.0      [01-19-17 @ 20:01]  ANCA: cANCA Negative, pANCA Negative, atypical ANCA Negative      [01-19-17 @ 20:01]  anti-GBM <0.2      [01-20-17 @ 01:52]  ASLO 59      [01-19-17 @ 20:01]  Syphilis Screen (Treponema Pallidum Ab) Negative      [01-19-17 @ 20:01]  Free Light Chains: kappa 12.00, lambda 14.10, ratio = 0.85      [01-23 @ 14:39]

## 2017-09-04 NOTE — CONSULT NOTE ADULT - SUBJECTIVE AND OBJECTIVE BOX
CC: Patient is a 57y old  Female who presents with a chief complaint of Peritonitis, Hemoperitoneum.    HPI:  This is a 56 yo F PMH HTN, Polycythemia Vera on Hydroxyurea, and ESRD on peritoneal dialysis sp catheter placement in 2017 transferred from [facility] for further management of peritonitis/hemoperitoneum.  Patient presented to OSH c/o abdominal tenderness and multiple syncopal episodes.  Patient reports she was at home last night.  From 1-6pm she had indwelling peritoneal fluid, and it drained clear.  At 11pm she was having dessert when she began to experience extreme abdominal pain. The pain was described as constant, stabbing pain, 10/10 that was worse with movement.  She reports becoming weak and almost losing consciousness.  Later that evening, patient reports a second episode of syncope while having a bowel movement.  She and her family noted blood in the peritoneal catheter at that time.  At that time, her family called EMS and she was taken to [facility].    At [facility], patient hypotensive to 90s requiring 1.5L IVF for fluid resuscitation. CT Abdomen/Pelvis showing: moderate amount of fluid in the abdomen and pelvis with collections of hematoma/hemorrhage, largest collections in the pelvis. Peritoneal fluid sent for analysis showing large red cell count (3,139,000) and white blood cell count (7478), negative gram stain.  Initial Hgb 10.4 dropped to 7.6 requiring transfusion of one unit of PRBC.  Patient also noted to have leukocytosis to 31.8 treated with Vancomycin and Ceftriaxone x 1, then continued on Cefepime for possible SBP.      As patient's nephrologist is located at [facility] and patient receives most of her care at [facility], patient was transferred to [facility] per family request. Upon arrival, patient hemodynamically stable with shauna blood noted in PD catheter.  Patient with diffuse abdominal pain, however, abdomen remains soft to touch with positive bowel sounds.  Nephrology informed of patient admission to ICU for further dialysis management, Surgery was consulted for possible intervention.       PMH  ESRD on peritoneal dialysis  Splenic infarct  Splenomegaly  Hypertension  Peptic ulcer disease  Polycythemia vera    PSH  Peritoneal dialysis catheter in place  No significant past surgical history    MEDS    Allergies    No Known Allergies      Social: non smoker, non drinker.      Physical Exam  T(C): 36.1 (17 @ 00:00), Max: 37.1 (17 @ 18:45)  HR: 103 (17 @ 03:00) (88 - 103)  BP: 127/71 (17 @ 03:00) (127/71 - 154/71)  RR: 21 (17 @ 03:00) (18 - 31)  SpO2: 93% (17 @ 03:00) (93% - 98%)  Tmax: T(C): , Max: 37.1 (17 @ 18:45)      Gen: NAD, alert and interactive, oriented.  HEENT: normocephalic, atraumatic, no scleral icterus  CV: S1, S2, RRR  Pulm: CTA B/L  Abd: Soft, ND, tenderness throughout, no rebound, no guarding, no palpable organomegaly/masses, PD catheter noted, with blood within tubing.  Ext: warm, no edema, palp dp/pt  Skin: soft, no rash, no ecchymoses  Neuro: no focal deficits, CN grossly intact.      17  -  17  --------------------------------------------------------  IN:    dextrose 5%: 200 mL    Solution: 100 mL  Total IN: 300 mL    OUT:    Indwelling Catheter - Urethral: 935 mL  Total OUT: 935 mL    Total NET: -635 mL      Labs:                        8.5    18.4  )-----------( 387      ( 03 Sep 2017 23:40 )             26.5         144  |  111<H>  |  50<H>  ----------------------------<  109<H>  4.5   |  18<L>  |  4.95<H>    Ca    7.2<L>      03 Sep 2017 23:40  Phos  6.4       Mg     1.8         TPro  5.5<L>  /  Alb  3.0<L>  /  TBili  0.9  /  DBili  x   /  AST  11  /  ALT  6<L>  /  AlkPhos  116      PT/INR - ( 03 Sep 2017 19:53 )   PT: 13.3 sec;   INR: 1.22 ratio         PTT - ( 03 Sep 2017 19:53 )  PTT:33.1 sec  Urinalysis Basic - ( 03 Sep 2017 22:23 )    Color: x / Appearance: Clear / S.010 / pH: x  Gluc: x / Ketone: Negative  / Bili: Negative / Urobili: Negative   Blood: x / Protein: 100 mg/dL / Nitrite: Negative   Leuk Esterase: Negative / RBC: 0-2 /HPF / WBC 2-5 /HPF   Sq Epi: x / Non Sq Epi: x / Bacteria: Few /HPF

## 2017-09-04 NOTE — CONSULT NOTE ADULT - ATTENDING COMMENTS
57F post-menopausal, with PD catheter placement 1 month ago, had syncopal episode 2 days ago and hemoperitoneum, transferred from [facility] yesterday.    She is currently hemodynamically stable in MICU without signs of persistent bleeding on serial labs.  No abnormal uterine bleeding. No recent trauma. Unclear etiology for hemoperitoneum but she had a huge spleen on prior CT scans.    -possible etiologies are traction on PD catheter insertion site, splenic injury or herald bleed from undiagnosed mesenteric artery aneurysm  -CTA abdomen would be needed to diagnose mesenteric artery aneurysm, but this is rare  -CT abdomen w/ IV contrast would be needed to diagnose splenic injury, this is more common in the setting of splenomegaly    -continuing peritoneal dialysis could be useful for confirming cessation of bleeding by observing effluent
58 yo female with recent new start PD (Sachdeva), chronic GN, polycythemia vera was at home on Sat completed PD and drained without any difficulty  6 hours later pt reported sharp pain while at home felt weak and then she syncopized and was brought to [facility]  The catheter was manipulated at that time and was noted to have hemoperitoneum and CT confirmed that  Given abx and transfused 1 unit  Transferred here for further management  She is in ICU, complains of abd tenderness  Serial HCT being followed. Started on cefepime  Would follow Vanco levels and continue for now by levels  Would hold off on manipulating the catheter for now since pt reports it was manipulated without sterile techniques in the other hospital  She likely will need hemodialysis temporarily however no acute indication for HD today  She has residual renal function so if we can try to avoid iv contrast imaging, however I did explain to pt that if her HCT declines she will need to get IV contrast to locate source of bleed.

## 2017-09-04 NOTE — CHART NOTE - NSCHARTNOTEFT_GEN_A_CORE
Transfer note from Dr. Hansen reviewed, agreed w/ note. Briefly, 57F hx polycythemia vera, ESRD on PD since 8/2017 initially presented to [facility] w/ abd pain, syncope, hypotensive found to have hemoperitoneum/peritonitis possibly related to PD catheter placement though etiology remains unclear. Pt was transferred to [facility] MICU for further monitoring on 9/3.  Pt received 1pRBC at ?OSH. In MICU, Hgb remained stable in 8. No surgical intervention per trauma surgery as pt remained hemodynamically stable in MICU w/out signs of persistent bleeding on abd exam.  PD currently on hold given hemorrhagic peritoneum.  Per renal on 9/4, no acute indication for HD but will likely eventually need HD.  IV contrast imaging was recommended by surgery to evaluate for other etiologies of bleed but was held off 2/2 poor residual function & due to stable Hgb.  Pt receiving IV abx for possible SBP. Gram stain was negative, however pt did have low grade fever & has persistent leukocytosis. See MICU transfer note for further details.    LYNDSEY Guerra  MAR, [id number] Transfer note from Dr. Hansen reviewed, agreed w/ note. Briefly, 57F hx polycythemia vera, ESRD on PD since 8/2017 initially presented to [facility] w/ abd pain, syncope, hypotension found to have acute blood loss anemia & hemoperitoneum/peritonitis possibly related to PD catheter placement though etiology remains unclear. Pt was transferred to [facility] MICU for further monitoring on 9/3.  Pt received 1pRBC at ?OSH. In MICU, Hgb remained stable in 8. No surgical intervention per trauma surgery as pt remained hemodynamically stable in MICU w/out signs of persistent bleeding on abd exam.  PD currently on hold given hemorrhagic peritoneum.  Per renal on 9/4, no acute indication for HD but will likely eventually need HD.  Pt also on bicarbonate gtt to supplement PO while she was NPO. IV contrast imaging was recommended by surgery to evaluate for other etiologies of bleed but was held off 2/2 poor renal function & due to stable Hgb.  Pt receiving IV abx for possible SBP. Gram stain was negative, however pt did have low grade fever & has persistent leukocytosis. Current plan to is to c/w serial Hgb monitoring and IV abx, pain control, surgery & renal follow up. See MICU transfer note for further details.    LYNDSEY BONILLA, [id number] Transfer note from Dr. Hansen reviewed, agreed w/ note. Pt seen & examined. Briefly, 57F hx polycythemia vera, ESRD on PD since 8/2017 initially presented to [facility] w/ abd pain, syncope, hypotension found to have acute blood loss anemia & hemoperitoneum/peritonitis possibly related to PD catheter placement though etiology remains unclear. Pt was transferred to [facility] MICU for further monitoring on 9/3.  Pt received 1pRBC at ?OSH. In MICU, Hgb remained stable in 8. No surgical intervention per trauma surgery as pt remained hemodynamically stable in MICU w/out signs of persistent bleeding on abd exam.  PD currently on hold given hemorrhagic peritoneum.  Per renal on 9/4, no acute indication for HD but will likely eventually need HD.  Pt also on bicarbonate gtt to supplement PO home dose while she was NPO. IV contrast imaging was recommended by surgery to evaluate for other etiologies of bleed but was held off 2/2 poor renal function & due to stable Hgb.  Pt receiving IV abx for possible SBP. Gram stain was negative, however pt did have low grade fever & has persistent leukocytosis. On exam, pt AOx3, NAD, still w/ diffuse abd tenderness. PD catheter still present. Plan is as follows- c/w serial Hgb monitoring (will change CBC from q8 to q12 as Hgb has been stable) and IV abx for suspected SBP. Will adjust pain regimen from fentanyl to oxycodone & dilaudid. Will change bicarbonate gtt to PO form. Also need surgery re: removal of PD catheter & renal follow up re: new access & plan for HD. See MICU transfer note for further details.    LYNDSEY BONILLA, [id number] Transfer note from Dr. Hansen reviewed, agreed w/ note. Pt seen & examined. Briefly, 57F hx polycythemia vera, ESRD on PD since 8/2017 initially presented to [facility] w/ abd pain, syncope, hypotension found to have acute blood loss anemia & hemoperitoneum/peritonitis possibly related to PD catheter placement though etiology remains unclear. Pt was transferred to [facility] MICU for further monitoring on 9/3.  Pt received 1pRBC at ?OSH. In MICU, Hgb remained stable in 8. No surgical intervention per trauma surgery as pt remained hemodynamically stable in MICU w/out signs of persistent bleeding on abd exam.  PD currently on hold given hemorrhagic peritoneum.  Per renal on 9/4, no acute indication for HD but will likely eventually need HD.  Pt also on bicarbonate gtt to supplement PO home dose while she was NPO. IV contrast imaging was recommended by surgery to evaluate for other etiologies of bleed but was held off 2/2 poor renal function & due to stable Hgb.  Pt receiving IV abx for possible SBP. Gram stain was negative, however pt did have low grade fever & has persistent leukocytosis. On exam, pt AOx3, NAD, still w/ diffuse abd tenderness. PD catheter still present. Plan is as follows- c/w serial Hgb monitoring (will change CBC from q8 to q12 as Hgb has been stable) and IV abx for suspected SBP. Will adjust pain regimen from fentanyl to oxycodone & dilaudid. Will change bicarbonate gtt to PO form since pt tolerating diet now.  Still holding diuretic & labetalol w/ plan to resume when appropriate. Also need surgery re: removal of PD catheter & renal follow up re: new access & plan for HD. See MICU transfer note for further details.    LYNDSEY BONILLA, [id number]

## 2017-09-04 NOTE — PROGRESS NOTE ADULT - SUBJECTIVE AND OBJECTIVE BOX
58 yo F PMH HTN, Polycythemia Vera on Hydroxyurea, and ESRD on peritoneal dialysis 2/2 chronic GN sp catheter placement in 2017 transferred from [facility] for further management of peritonitis/hemoperitoneum.      Patient presented to OSH c/o abdominal tenderness that began suddenly 5-6 hours after while on PD. She began PD on saturday 6 pm w/o complication intially. She had multiple syncopal episodes. On  between 1-6pm she had indwelling peritoneal fluid, and it drained clear initially She reports becoming weak and almost losing consciousness.  Later that evening, patient reports a second episode of syncope while having a bowel movement.  She and her family noted blood in the peritoneal catheter at that time. She was taken to [facility] where she was hypotensive to 90s requiring 1.5L IVF for fluid resuscitation. CT Abdomen/Pelvis showing: moderate amount of fluid in the abdomen and pelvis with collections of hematoma/hemorrhage, largest collections in the pelvis. Peritoneal fluid sent for analysis showing large red cell count (3,139,000) and white blood cell count (7478), negative gram stain.  Initial Hgb 10.4 dropped to 7.6 requiring transfusion of one unit of PRBC.  Patient also noted to have leukocytosis to 31.8 treated with Vancomycin and Ceftriaxone x 1, then continued on Cefepime for possible SBP.      Interval Events:    REVIEW OF SYSTEMS:  Constitutional: [ ] negative [ ] fevers [ ] chills [ ] weight loss [ ] weight gain  HEENT: [ ] negative [ ] dry eyes [ ] eye irritation [ ] postnasal drip [ ] nasal congestion  CV: [ ] negative  [ ] chest pain [ ] orthopnea [ ] palpitations [ ] murmur  Resp: [ ] negative [ ] cough [ ] shortness of breath [ ] dyspnea [ ] wheezing [ ] sputum [ ] hemoptysis  GI: [ ] negative [ ] nausea [ ] vomiting [ ] diarrhea [ ] constipation [ ] abd pain [ ] dysphagia   : [ ] negative [ ] dysuria [ ] nocturia [ ] hematuria [ ] increased urinary frequency  Musculoskeletal: [ ] negative [ ] back pain [ ] myalgias [ ] arthralgias [ ] fracture  Skin: [ ] negative [ ] rash [ ] itch  Neurological: [ ] negative [ ] headache [ ] dizziness [ ] syncope [ ] weakness [ ] numbness  Psychiatric: [ ] negative [ ] anxiety [ ] depression  Endocrine: [ ] negative [ ] diabetes [ ] thyroid problem  Hematologic/Lymphatic: [ ] negative [ ] anemia [ ] bleeding problem  Allergic/Immunologic: [ ] negative [ ] itchy eyes [ ] nasal discharge [ ] hives [ ] angioedema  [ ] All other systems negative  [ ] Unable to assess ROS because ________    OBJECTIVE:  ICU Vital Signs Last 24 Hrs  T(C): 37.3 (04 Sep 2017 07:00), Max: 37.3 (04 Sep 2017 07:00)  T(F): 99.1 (04 Sep 2017 07:00), Max: 99.1 (04 Sep 2017 07:00)  HR: 103 (04 Sep 2017 08:00) (88 - 106)  BP: 147/83 (04 Sep 2017 08:00) (127/71 - 154/71)  BP(mean): 110 (04 Sep 2017 08:00) (93 - 110)  ABP: --  ABP(mean): --  RR: 24 (04 Sep 2017 08:00) (18 - 31)  SpO2: 94% (04 Sep 2017 08:00) (93% - 98%)        - @ 07:01  -   @ 07:00  --------------------------------------------------------  IN: 500 mL / OUT: 1300 mL / NET: -800 mL      CAPILLARY BLOOD GLUCOSE  105 (04 Sep 2017 00:00)      GENERAL: Well appearing F in no acute distress, well-developed  HEAD:  Atraumatic, Normocephalic  ENT: EOMI, PERRLA, conjunctiva and sclera clear, Neck supple, No JVD, moist mucosa in oropharynx  CHEST/LUNG: Clear to auscultation bilaterally; No wheeze, equal breath sounds bilaterally   HEART: Regular rate and rhythm; No murmurs, rubs, or gallops  ABDOMEN: Distended, very tender to palpation, worse around PD site; Bowel sounds present,organomegaly canot be appreciated  EXTREMITIES:  2+ Peripheral Pulses, No clubbing, cyanosis, or edema  PSYCH: AAOx3  NEUROLOGY: non-focal, cranial nerves intact  SKIN: Normal color, No rashes or lesions    LINES: R. arm peripheral IV, L. hand peripheral IV    HOSPITAL MEDICATIONS:  Standing Meds:  cefepime  IVPB   IV Intermittent   dextrose 5% 1000 milliLiter(s) IV Continuous <Continuous>    PRN Meds:  fentaNYL    Injectable 25 MICROGram(s) IV Push every 4 hours PRN    LABS:                        8.3    17.6  )-----------( 365      ( 04 Sep 2017 04:11 )             25.6     Hgb Trend: 8.3<--, 8.5<--, 8.5<--      144  |  109<H>  |  49<H>  ----------------------------<  115<H>  4.0   |  20<L>  |  4.91<H>    Ca    7.3<L>      04 Sep 2017 04:11  Phos  6.0       Mg     1.8         TPro  5.5<L>  /  Alb  3.0<L>  /  TBili  0.9  /  DBili  x   /  AST  11  /  ALT  6<L>  /  AlkPhos  116      Creatinine Trend: 4.91<--, 4.95<--, 4.92<--, 5.07<--, 5.23<--, 5.10<--  PT/INR - ( 03 Sep 2017 19:53 )   PT: 13.3 sec;   INR: 1.22 ratio         PTT - ( 03 Sep 2017 19:53 )  PTT:33.1 sec  Urinalysis Basic - ( 03 Sep 2017 22:23 )    Color: x / Appearance: Clear / S.010 / pH: x  Gluc: x / Ketone: Negative  / Bili: Negative / Urobili: Negative   Blood: x / Protein: 100 mg/dL / Nitrite: Negative   Leuk Esterase: Negative / RBC: 0-2 /HPF / WBC 2-5 /HPF   Sq Epi: x / Non Sq Epi: x / Bacteria: Few /HPF    MICROBIOLOGY: 58 yo F PMH HTN, Polycythemia Vera on Hydroxyurea, and ESRD on peritoneal dialysis 2/2 chronic GN sp catheter placement in 2017 transferred from [facility] for further management of peritonitis/hemoperitoneum.      Patient presented to OSH c/o abdominal tenderness that began suddenly 5-6 hours after while on PD. She began PD on saturday 6 pm w/o complication intially. She had multiple syncopal episodes. On  between 1-6pm she had indwelling peritoneal fluid, and it drained clear initially She reports becoming weak and almost losing consciousness.  Later that evening, patient reports a second episode of syncope while having a bowel movement.  She and her family noted blood in the peritoneal catheter at that time. She was taken to [facility] where she was hypotensive to 90s requiring 1.5L IVF for fluid resuscitation. CT Abdomen/Pelvis showing: moderate amount of fluid in the abdomen and pelvis with collections of hematoma/hemorrhage, largest collections in the pelvis. Peritoneal fluid sent for analysis showing large red cell count (3,139,000) and white blood cell count (7478), negative gram stain.  Initial Hgb 10.4 dropped to 7.6 requiring transfusion of one unit of PRBC.  Patient also noted to have leukocytosis to 31.8 treated with Vancomycin and Ceftriaxone x 1, then continued on Cefepime for possible SBP.      Interval Events: No overnight events.    REVIEW OF SYSTEMS:    CONSTITUTIONAL: No weakness, fevers or chills  EYES/ENT: No visual changes;  No vertigo or throat pain   NECK: No pain or stiffness  RESPIRATORY: No cough, wheezing, hemoptysis; No shortness of breath  CARDIOVASCULAR: No chest pain or palpitations  GASTROINTESTINAL: No abdominal or epigastric pain. No nausea, vomiting, or hematemesis; No diarrhea or constipation. No melena or hematochezia.  GENITOURINARY: No dysuria, frequency or hematuria  NEUROLOGICAL: No numbness or weakness  SKIN: No itching, burning, rashes, or lesions   All other review of systems is negative unless indicated above.    OBJECTIVE:  ICU Vital Signs Last 24 Hrs  T(C): 37.3 (04 Sep 2017 07:00), Max: 37.3 (04 Sep 2017 07:00)  T(F): 99.1 (04 Sep 2017 07:00), Max: 99.1 (04 Sep 2017 07:00)  HR: 103 (04 Sep 2017 08:00) (88 - 106)  BP: 147/83 (04 Sep 2017 08:00) (127/71 - 154/71)  BP(mean): 110 (04 Sep 2017 08:00) (93 - 110)  ABP: --  ABP(mean): --  RR: 24 (04 Sep 2017 08:00) (18 - 31)  SpO2: 94% (04 Sep 2017 08:00) (93% - 98%)         @ 07:01  -   @ 07:00  --------------------------------------------------------  IN: 500 mL / OUT: 1300 mL / NET: -800 mL      CAPILLARY BLOOD GLUCOSE  105 (04 Sep 2017 00:00)      GENERAL: Well appearing F in no acute distress, well-developed  HEAD:  Atraumatic, Normocephalic  ENT: EOMI, PERRLA, conjunctiva and sclera clear, Neck supple, No JVD, moist mucosa in oropharynx  CHEST/LUNG: Clear to auscultation bilaterally; No wheeze, equal breath sounds bilaterally   HEART: Regular rate and rhythm; No murmurs, rubs, or gallops  ABDOMEN: Distended, very tender to palpation, worse around PD site; Bowel sounds present,organomegaly canot be appreciated  EXTREMITIES:  2+ Peripheral Pulses, No clubbing, cyanosis, or edema  PSYCH: AAOx3  NEUROLOGY: non-focal, cranial nerves intact  SKIN: Normal color, No rashes or lesions    LINES: R. arm peripheral IV, L. hand peripheral IV    HOSPITAL MEDICATIONS:  Standing Meds:  cefepime  IVPB   IV Intermittent   dextrose 5% 1000 milliLiter(s) IV Continuous <Continuous>    PRN Meds:  fentaNYL    Injectable 25 MICROGram(s) IV Push every 4 hours PRN    LABS:                        8.3    17.6  )-----------( 365      ( 04 Sep 2017 04:11 )             25.6     Hgb Trend: 8.3<--, 8.5<--, 8.5<--  04    144  |  109<H>  |  49<H>  ----------------------------<  115<H>  4.0   |  20<L>  |  4.91<H>    Ca    7.3<L>      04 Sep 2017 04:11  Phos  6.0       Mg     1.8         TPro  5.5<L>  /  Alb  3.0<L>  /  TBili  0.9  /  DBili  x   /  AST  11  /  ALT  6<L>  /  AlkPhos  116      Creatinine Trend: 4.91<--, 4.95<--, 4.92<--, 5.07<--, 5.23<--, 5.10<--  PT/INR - ( 03 Sep 2017 19:53 )   PT: 13.3 sec;   INR: 1.22 ratio         PTT - ( 03 Sep 2017 19:53 )  PTT:33.1 sec  Urinalysis Basic - ( 03 Sep 2017 22:23 )    Color: x / Appearance: Clear / S.010 / pH: x  Gluc: x / Ketone: Negative  / Bili: Negative / Urobili: Negative   Blood: x / Protein: 100 mg/dL / Nitrite: Negative   Leuk Esterase: Negative / RBC: 0-2 /HPF / WBC 2-5 /HPF   Sq Epi: x / Non Sq Epi: x / Bacteria: Few /HPF    MICROBIOLOGY:

## 2017-09-04 NOTE — PROGRESS NOTE ADULT - ATTENDING COMMENTS
57 year old woman transferred from [facility] after presenting there with acute onset of abdominal pain and syncope found to have hemoperitoneum  patient recently had PD catheter placed 3 weeks ago was doing OK with PD until this incident  etiology of hemoperitoneum is unclear  surgery is following  HGB has been stable after 1 u PRBC transfusion  no need for dialysis at this time no electrolyte imbalance and patient still has urine output  pain control  no longer needs vasopressor support

## 2017-09-04 NOTE — CONSULT NOTE ADULT - PROBLEM SELECTOR RECOMMENDATION 9
Peritoneal fluid sent for analysis showing large red cell count (3,139,000) and white blood cell count (7478), negative gram stain.  treated with Vancomycin and Ceftriaxone x 1, then continued on Cefepime for possible SBP.  Seen by trauma surgery that recommended no surgical intervention at this time.   Hgb has remained stable for now.   Please send PD fluid cell count, gram stain, cultures. Peritoneal fluid sent for analysis showing large red cell count (3,139,000) and white blood cell count (7478), negative gram stain.  treated with Vancomycin and Ceftriaxone x 1, then continued on Cefepime for possible SBP.  Seen by trauma surgery that recommended no surgical intervention at this time.   Hgb has remained stable for now.

## 2017-09-04 NOTE — CONSULT NOTE ADULT - PROBLEM SELECTOR RECOMMENDATION 2
On PD secondary to chronic GN. In the setting of hemorrhagic peritoneum and possible peritonitis would hold PD for now.   Consent obtained for Renal replacement therapy discussion for HD d/w with patient.   No acute indication for HD at this time.

## 2017-09-05 DIAGNOSIS — N25.0 RENAL OSTEODYSTROPHY: ICD-10-CM

## 2017-09-05 DIAGNOSIS — I10 ESSENTIAL (PRIMARY) HYPERTENSION: ICD-10-CM

## 2017-09-05 DIAGNOSIS — D50.0 IRON DEFICIENCY ANEMIA SECONDARY TO BLOOD LOSS (CHRONIC): ICD-10-CM

## 2017-09-05 LAB
ALBUMIN SERPL ELPH-MCNC: 2.7 G/DL — LOW (ref 3.3–5)
ALP SERPL-CCNC: 135 U/L — HIGH (ref 40–120)
ALT FLD-CCNC: <4 U/L — LOW (ref 10–45)
ANION GAP SERPL CALC-SCNC: 14 MMOL/L — SIGNIFICANT CHANGE UP (ref 5–17)
APTT BLD: 34.9 SEC — SIGNIFICANT CHANGE UP (ref 27.5–37.4)
AST SERPL-CCNC: 17 U/L — SIGNIFICANT CHANGE UP (ref 10–40)
BASOPHILS # BLD AUTO: 0.04 K/UL — SIGNIFICANT CHANGE UP (ref 0–0.2)
BASOPHILS NFR BLD AUTO: 0.2 % — SIGNIFICANT CHANGE UP (ref 0–2)
BILIRUB SERPL-MCNC: 1.9 MG/DL — HIGH (ref 0.2–1.2)
BUN SERPL-MCNC: 42 MG/DL — HIGH (ref 7–23)
CALCIUM SERPL-MCNC: 7.4 MG/DL — LOW (ref 8.4–10.5)
CHLORIDE SERPL-SCNC: 100 MMOL/L — SIGNIFICANT CHANGE UP (ref 96–108)
CO2 SERPL-SCNC: 23 MMOL/L — SIGNIFICANT CHANGE UP (ref 22–31)
CREAT SERPL-MCNC: 4.27 MG/DL — HIGH (ref 0.5–1.3)
EOSINOPHIL # BLD AUTO: 0.27 K/UL — SIGNIFICANT CHANGE UP (ref 0–0.5)
EOSINOPHIL NFR BLD AUTO: 1.6 % — SIGNIFICANT CHANGE UP (ref 0–6)
GLUCOSE SERPL-MCNC: 91 MG/DL — SIGNIFICANT CHANGE UP (ref 70–99)
HCT VFR BLD CALC: 25.6 % — LOW (ref 34.5–45)
HCT VFR BLD CALC: 26.2 % — LOW (ref 34.5–45)
HGB BLD-MCNC: 7.6 G/DL — LOW (ref 11.5–15.5)
HGB BLD-MCNC: 8.3 G/DL — LOW (ref 11.5–15.5)
IMM GRANULOCYTES NFR BLD AUTO: 0.2 % — SIGNIFICANT CHANGE UP (ref 0–1.5)
INR BLD: 1.33 RATIO — HIGH (ref 0.88–1.16)
LYMPHOCYTES # BLD AUTO: 0.91 K/UL — LOW (ref 1–3.3)
LYMPHOCYTES # BLD AUTO: 5.3 % — LOW (ref 13–44)
MAGNESIUM SERPL-MCNC: 1.6 MG/DL — SIGNIFICANT CHANGE UP (ref 1.6–2.6)
MCHC RBC-ENTMCNC: 22.4 PG — LOW (ref 27–34)
MCHC RBC-ENTMCNC: 24.4 PG — LOW (ref 27–34)
MCHC RBC-ENTMCNC: 29.7 GM/DL — LOW (ref 32–36)
MCHC RBC-ENTMCNC: 31.8 GM/DL — LOW (ref 32–36)
MCV RBC AUTO: 75.3 FL — LOW (ref 80–100)
MCV RBC AUTO: 76.8 FL — LOW (ref 80–100)
MONOCYTES # BLD AUTO: 1 K/UL — HIGH (ref 0–0.9)
MONOCYTES NFR BLD AUTO: 5.8 % — SIGNIFICANT CHANGE UP (ref 2–14)
NEUTROPHILS # BLD AUTO: 15 K/UL — HIGH (ref 1.8–7.4)
NEUTROPHILS NFR BLD AUTO: 86.9 % — HIGH (ref 43–77)
PHOSPHATE SERPL-MCNC: 5.1 MG/DL — HIGH (ref 2.5–4.5)
PLATELET # BLD AUTO: 373 K/UL — SIGNIFICANT CHANGE UP (ref 150–400)
PLATELET # BLD AUTO: 443 K/UL — HIGH (ref 150–400)
POTASSIUM SERPL-MCNC: 3.9 MMOL/L — SIGNIFICANT CHANGE UP (ref 3.5–5.3)
POTASSIUM SERPL-SCNC: 3.9 MMOL/L — SIGNIFICANT CHANGE UP (ref 3.5–5.3)
PROT SERPL-MCNC: 5.8 G/DL — LOW (ref 6–8.3)
PROTHROM AB SERPL-ACNC: 15.1 SEC — HIGH (ref 10–13.1)
RBC # BLD: 3.4 M/UL — LOW (ref 3.8–5.2)
RBC # BLD: 3.41 M/UL — LOW (ref 3.8–5.2)
RBC # FLD: 21.1 % — HIGH (ref 10.3–14.5)
RBC # FLD: 21.2 % — HIGH (ref 10.3–14.5)
SODIUM SERPL-SCNC: 137 MMOL/L — SIGNIFICANT CHANGE UP (ref 135–145)
WBC # BLD: 17.26 K/UL — HIGH (ref 3.8–10.5)
WBC # BLD: 19.5 K/UL — HIGH (ref 3.8–10.5)
WBC # FLD AUTO: 17.26 K/UL — HIGH (ref 3.8–10.5)
WBC # FLD AUTO: 19.5 K/UL — HIGH (ref 3.8–10.5)

## 2017-09-05 PROCEDURE — 99233 SBSQ HOSP IP/OBS HIGH 50: CPT | Mod: GC

## 2017-09-05 PROCEDURE — 74177 CT ABD & PELVIS W/CONTRAST: CPT | Mod: 26

## 2017-09-05 RX ORDER — HYDROMORPHONE HYDROCHLORIDE 2 MG/ML
0.5 INJECTION INTRAMUSCULAR; INTRAVENOUS; SUBCUTANEOUS EVERY 4 HOURS
Qty: 0 | Refills: 0 | Status: DISCONTINUED | OUTPATIENT
Start: 2017-09-05 | End: 2017-09-11

## 2017-09-05 RX ORDER — OXYCODONE HYDROCHLORIDE 5 MG/1
2.5 TABLET ORAL DAILY
Qty: 0 | Refills: 0 | Status: DISCONTINUED | OUTPATIENT
Start: 2017-09-05 | End: 2017-09-12

## 2017-09-05 RX ORDER — SODIUM BICARBONATE 1 MEQ/ML
650 SYRINGE (ML) INTRAVENOUS
Qty: 0 | Refills: 0 | Status: DISCONTINUED | OUTPATIENT
Start: 2017-09-05 | End: 2017-09-18

## 2017-09-05 RX ADMIN — Medication 650 MILLIGRAM(S): at 12:10

## 2017-09-05 RX ADMIN — Medication 100 MILLIGRAM(S): at 18:50

## 2017-09-05 RX ADMIN — Medication 100 MILLIGRAM(S): at 05:11

## 2017-09-05 RX ADMIN — Medication 650 MILLIGRAM(S): at 05:11

## 2017-09-05 RX ADMIN — HYDROMORPHONE HYDROCHLORIDE 0.5 MILLIGRAM(S): 2 INJECTION INTRAMUSCULAR; INTRAVENOUS; SUBCUTANEOUS at 15:16

## 2017-09-05 RX ADMIN — HYDROMORPHONE HYDROCHLORIDE 0.5 MILLIGRAM(S): 2 INJECTION INTRAMUSCULAR; INTRAVENOUS; SUBCUTANEOUS at 14:59

## 2017-09-05 RX ADMIN — SENNA PLUS 2 TABLET(S): 8.6 TABLET ORAL at 21:11

## 2017-09-05 RX ADMIN — AMLODIPINE BESYLATE 10 MILLIGRAM(S): 2.5 TABLET ORAL at 05:11

## 2017-09-05 RX ADMIN — Medication 650 MILLIGRAM(S): at 18:50

## 2017-09-05 RX ADMIN — CEFEPIME 100 MILLIGRAM(S): 1 INJECTION, POWDER, FOR SOLUTION INTRAMUSCULAR; INTRAVENOUS at 21:12

## 2017-09-05 NOTE — PROGRESS NOTE ADULT - ASSESSMENT
Patient is a 56 y/o F w/ ESRD on HD who presented to OSH with abdominal pain, found with significant hemoperitoneum with unclear source of bleeding.

## 2017-09-05 NOTE — PROGRESS NOTE ADULT - PROBLEM SELECTOR PLAN 4
Continue to monitor CBCs Q12  - Transfuse for hg >7.0 or if symptomatic   - Continue orthostatic blood pressures

## 2017-09-05 NOTE — PROGRESS NOTE ADULT - ASSESSMENT
58 yo F pmhx  HTN, polycythemia vera, ESRD on PD (2/2 chronic GN) p/w hemoperitoneum and symptomatic hemorrhagic anemia.

## 2017-09-05 NOTE — PROGRESS NOTE ADULT - PROBLEM SELECTOR PLAN 1
Currently blood pressure is stable, with mild tachycardia likely from underlying anemia. Possible causes include traction on PD catheter insertion site, splenic injury or herald bleed from undiagnosed mesenteric artery aneurysm.   - STAT CBC for concerns of worsening abdominal distension  - Touch base with surgery regarding CT abdomen/pelvis  - Continue Q12 CBCs with serial vital sign monitoring

## 2017-09-05 NOTE — PROGRESS NOTE ADULT - PROBLEM SELECTOR PLAN 3
Transfuse as indicated at this time.  Current anemia exacerbation due to bleed as opposed to worsening renal function.

## 2017-09-05 NOTE — PROGRESS NOTE ADULT - PROBLEM SELECTOR PLAN 2
- Continue cefepime (started 9/3)  - Readdress cultures (unclear, no cultures in system, may be at OSH). Per documentation gram stain was negative.   - Readdress with renal peritoneal dialysis catheter and potential sources of infection. - Continue cefepime (started 9/3)  - Readdress cultures (unclear, no cultures in system, may be at OSH). Per documentation gram stain was negative.   - Readdress with renal peritoneal dialysis catheter given potential source of infection.

## 2017-09-05 NOTE — PROGRESS NOTE ADULT - PROBLEM SELECTOR PLAN 5
Monitor calcium and phos.  If patient is able to tolerate, would start phoslo (calcium acetate)  667 mg po TID with meals.

## 2017-09-05 NOTE — PROGRESS NOTE ADULT - ASSESSMENT
57y year old Female who presents with hemoperitoneum, patient had ESRD with peritoneal dialysis catheter placed on august, presented to OSH with bleeding in abdomen, transferred to [facility] for further management, now with stable H/H x 48 hours  - Continue q12 H/H checks, transfuse as necessary  - No acute surgical intervention at this time    DUANE Zayas PGY 2  1327 57y year old Female who presents with hemoperitoneum, patient had ESRD with peritoneal dialysis catheter placed on august, presented to OSH with bleeding in abdomen, transferred to [facility] for further management, now with stable H/H x 48 hours  - Continue q12 H/H checks, transfuse as necessary  - No acute surgical intervention at this time  - Recommend CT Abdomen Pelvis, appreciate nephrology recs  - D/w Dr. Héctor Zayas PGY 2  [id number]

## 2017-09-05 NOTE — PROGRESS NOTE ADULT - PROBLEM SELECTOR PLAN 2
Will defer PD until cleared by surgery, likely to be off PD for several months.  If indicated will place temporary dialysis catheter for hemodialysis.  No emergent HD indications at this time.

## 2017-09-05 NOTE — PROGRESS NOTE ADULT - ATTENDING COMMENTS
56 yo female with recent new start PD ([employer]), chronic GN, polycythemia vera was at home on Sat completed PD and drained without any difficulty  6 hours later pt reported sharp pain while at home felt weak and then she syncopized and was brought to [facility]  The catheter was manipulated at that time and was noted to have hemoperitoneum and CT confirmed that  Given abx and transfused 1 unit    ESRD: on peritoneal dialysis  Currently with stable electrolytes  No urgent indication for dialysis today    Hemoperitoneum: ?etiology  Hct dropped earlier today and is now stable  Her  h/o  PCV and splenomegaly is concerning for a splenic bleed  Would consider CT scan with contrast to delineate the bleeding source. Patient understands that the contrast may lead to loss of her residual renal function which is necessary for peritoneal dialysis.  If dialysis is needed, will transition to HD 56 yo female with recent new start PD (Genet), chronic GN, polycythemia vera was at home on Sat completed PD and drained without any difficulty  6 hours later pt reported sharp pain while at home felt weak and then she syncopized and was brought to [facility]  The catheter was manipulated at that time and was noted to have hemoperitoneum and CT confirmed that  Given abx and transfused 1 unit    ESRD: on peritoneal dialysis  Currently with stable electrolytes  No urgent indication for dialysis today    Hemoperitoneum: ?etiology  Hct dropped earlier today and is now stable  Her  h/o  PCV and splenomegaly is concerning for a splenic bleed  Would consider CT scan with contrast to delineate the bleeding source. Patient understands that the contrast may lead to loss of her residual renal function which is necessary for peritoneal dialysis.  If dialysis is needed, will transition to HD  Continue with Antibiotics

## 2017-09-05 NOTE — PROGRESS NOTE ADULT - SUBJECTIVE AND OBJECTIVE BOX
Medicine Accept Note/Progress Note:    Hospital Course:  58 yo F PMH HTN, Polycythemia Vera on Hydroxyurea, and ESRD on peritoneal dialysis sp catheter placement in 2017 transferred from [facility] for further management of peritonitis/hemoperitoneum.  Patient presented to OSH c/o abdominal tenderness and multiple syncopal episodes.  Patient reports she was at home last night.  From 1-6pm she had indwelling peritoneal fluid, and it drained clear.  At 11pm she was having dessert when she began to experience extreme abdominal pain. The pain was described as constant, stabbing pain, 10/10 that was worse with movement.  She reports becoming weak and almost losing consciousness.  Later that evening, patient reports a second episode of syncope while having a bowel movement.  She and her family noted blood in the peritoneal catheter at that time.  At that time, her family called EMS and she was taken to [facility].    At [facility], patient hypotensive to 90s requiring 1.5L IVF for fluid resuscitation. CT Abdomen/Pelvis showing: moderate amount of fluid in the abdomen and pelvis with collections of hematoma/hemorrhage, largest collections in the pelvis. Peritoneal fluid sent for analysis showing large red cell count (3,139,000) and white blood cell count (7478), negative gram stain.  Initial Hgb 10.4 dropped to 7.6 requiring transfusion of one unit of PRBC.  Patient also noted to have leukocytosis to 31.8 treated with Vancomycin and Ceftriaxone x 1, then continued on Cefepime for possible SBP.    As patient's nephrologist is located at [facility] and patient receives most of her care at [facility], patient was transferred to [facility] per family request and for possible continued PD.    MICU COURSE:   Pt admit to MICU on 9/3. Pt received IVF and 1U RBC.  Hemoglobin was monitored and kept above 8. Renal was consulted. Surgery was consulted. Pt was started on empiric antibiotics. Peritoneal fluid showed fluid shows large RBC + WBC count, negative gram stain. Surgery advised no intervention at this time. Renal advised no HD at this time and would like to monitor. The patient had the topete removed and is spontaneously voiding.  Bicarb drip was started in order to supplement home medication. On , the patient had a T100.4, and received 1g Ofirmev, blood cultures were sent. The patient remained HD stable without vasopressor support during admission. The patient was medically optimized for transfer to the floor.     SUBJECTIVE / OVERNIGHT EVENTS:  Patient continues to have diffuse abdominal pain with worsening abdominal distension. Blood pressure at bedside was 120s systolic. No fevers, although occasionally feels cold. No lightheadedness, dizziness or palpitations.       MEDICATIONS  (STANDING):  cefepime  IVPB   IV Intermittent   cefepime  IVPB 500 milliGRAM(s) IV Intermittent every 48 hours  amLODIPine   Tablet 10 milliGRAM(s) Oral daily  senna 2 Tablet(s) Oral at bedtime  docusate sodium 100 milliGRAM(s) Oral two times a day  sodium bicarbonate 650 milliGRAM(s) Oral four times a day    MEDICATIONS  (PRN):  acetaminophen   Tablet. 650 milliGRAM(s) Oral every 6 hours PRN headache  HYDROmorphone  Injectable 0.5 milliGRAM(s) IV Push every 4 hours PRN Severe Pain (7 - 10)  oxyCODONE    IR 2.5 milliGRAM(s) Oral daily PRN Moderate Pain (4 - 6)      CAPILLARY BLOOD GLUCOSE  102 (05 Sep 2017 08:19)  125 (04 Sep 2017 16:00)        I&O's Summary    04 Sep 2017 07:  -  05 Sep 2017 07:00  --------------------------------------------------------  IN: 1700 mL / OUT: 1310 mL / NET: 390 mL    05 Sep 2017 07:  -  05 Sep 2017 12:38  --------------------------------------------------------  IN: 350 mL / OUT: 0 mL / NET: 350 mL        PHYSICAL EXAM:  GENERAL: NAD, well-developed  HEAD:  Atraumatic, Normocephalic  CHEST/LUNG: Clear to auscultation bilaterally; No wheezes, rhonchi or gallops  HEART: Regular rate and rhythm; tachycardic, no murmurs, rubs, or gallops  ABDOMEN: Tender with guarding, distended  EXTREMITIES:  No edema  PSYCH: Appropriate    LABS:                        7.6    17.26 )-----------( 373      ( 05 Sep 2017 08:36 )             25.6         137  |  100  |  42<H>  ----------------------------<  91  3.9   |  23  |  4.27<H>    Ca    7.4<L>      05 Sep 2017 08:46  Phos  5.1       Mg     1.6         TPro  5.8<L>  /  Alb  2.7<L>  /  TBili  1.9<H>  /  DBili  x   /  AST  17  /  ALT  <4<L>  /  AlkPhos  135<H>      PT/INR - ( 05 Sep 2017 08:38 )   PT: 15.1 sec;   INR: 1.33 ratio         PTT - ( 05 Sep 2017 08:38 )  PTT:34.9 sec      Urinalysis Basic - ( 03 Sep 2017 22:23 )    Color: x / Appearance: Clear / S.010 / pH: x  Gluc: x / Ketone: Negative  / Bili: Negative / Urobili: Negative   Blood: x / Protein: 100 mg/dL / Nitrite: Negative   Leuk Esterase: Negative / RBC: 0-2 /HPF / WBC 2-5 /HPF   Sq Epi: x / Non Sq Epi: x / Bacteria: Few /HPF        RADIOLOGY & ADDITIONAL TESTS:    Imaging Personally Reviewed:    Consultant(s) Notes Reviewed:      Care Discussed with Consultants/Other Providers: Medicine Accept Note/Progress Note:    Hospital Course:  58 yo F PMH HTN, Polycythemia Vera on Hydroxyurea, and ESRD on peritoneal dialysis sp catheter placement in 2017 transferred from [facility] for further management of peritonitis/hemoperitoneum.  Patient presented to OSH c/o abdominal tenderness and multiple syncopal episodes.  Patient reports she was at home last night.  From 1-6pm she had indwelling peritoneal fluid, and it drained clear.  At 11pm she was having dessert when she began to experience extreme abdominal pain. The pain was described as constant, stabbing pain, 10/10 that was worse with movement.  She reports becoming weak and almost losing consciousness.  Later that evening, patient reports a second episode of syncope while having a bowel movement.  She and her family noted blood in the peritoneal catheter at that time.  At that time, her family called EMS and she was taken to [facility].    At [facility], patient hypotensive to 90s requiring 1.5L IVF for fluid resuscitation. CT Abdomen/Pelvis showing: moderate amount of fluid in the abdomen and pelvis with collections of hematoma/hemorrhage, largest collections in the pelvis. Peritoneal fluid sent for analysis showing large red cell count (3,139,000) and white blood cell count (7478), negative gram stain.  Initial Hgb 10.4 dropped to 7.6 requiring transfusion of one unit of PRBC.  Patient also noted to have leukocytosis to 31.8 treated with Vancomycin and Ceftriaxone x 1, then continued on Cefepime for possible SBP.    As patient's nephrologist is located at [facility] and patient receives most of her care at [facility], patient was transferred to [facility] per family request and for possible continued PD.    MICU COURSE:   Pt admit to MICU on 9/3. Pt received IVF and 1U RBC.  Hemoglobin was monitored and kept above 8. Renal was consulted. Surgery was consulted. Pt was started on empiric antibiotics. Peritoneal fluid showed fluid shows large RBC + WBC count, negative gram stain. Surgery advised no intervention at this time. Renal advised no HD at this time and would like to monitor. The patient had the topete removed and is spontaneously voiding.  Bicarb drip was started in order to supplement home medication. On , the patient had a T100.4, and received 1g Ofirmev, blood cultures were sent. The patient remained HD stable without vasopressor support during admission. The patient was medically optimized for transfer to the floor.     SUBJECTIVE / OVERNIGHT EVENTS:  Patient continues to have diffuse abdominal pain with worsening abdominal distension. Blood pressure at bedside was 120s systolic. No fevers, although occasionally feels cold. No lightheadedness, dizziness or palpitations.       MEDICATIONS  (STANDING):  cefepime  IVPB   IV Intermittent   cefepime  IVPB 500 milliGRAM(s) IV Intermittent every 48 hours  amLODIPine   Tablet 10 milliGRAM(s) Oral daily  senna 2 Tablet(s) Oral at bedtime  docusate sodium 100 milliGRAM(s) Oral two times a day  sodium bicarbonate 650 milliGRAM(s) Oral four times a day    MEDICATIONS  (PRN):  acetaminophen   Tablet. 650 milliGRAM(s) Oral every 6 hours PRN headache  HYDROmorphone  Injectable 0.5 milliGRAM(s) IV Push every 4 hours PRN Severe Pain (7 - 10)  oxyCODONE    IR 2.5 milliGRAM(s) Oral daily PRN Moderate Pain (4 - 6)      CAPILLARY BLOOD GLUCOSE  102 (05 Sep 2017 08:19)  125 (04 Sep 2017 16:00)        I&O's Summary    04 Sep 2017 07:  -  05 Sep 2017 07:00  --------------------------------------------------------  IN: 1700 mL / OUT: 1310 mL / NET: 390 mL    05 Sep 2017 07:  -  05 Sep 2017 12:38  --------------------------------------------------------  IN: 350 mL / OUT: 0 mL / NET: 350 mL        PHYSICAL EXAM:  GENERAL: NAD, well-developed  HEAD:  Atraumatic, Normocephalic  CHEST/LUNG: Clear to auscultation bilaterally; No wheezes, rhonchi or gallops  HEART: Regular rate and rhythm; tachycardic, no murmurs, rubs, or gallops  ABDOMEN: Tender with guarding, distended  EXTREMITIES:  No edema  PSYCH: Appropriate    LABS:                        7.6    17.26 )-----------( 373      ( 05 Sep 2017 08:36 )             25.6         137  |  100  |  42<H>  ----------------------------<  91  3.9   |  23  |  4.27<H>    Ca    7.4<L>      05 Sep 2017 08:46  Phos  5.1       Mg     1.6         TPro  5.8<L>  /  Alb  2.7<L>  /  TBili  1.9<H>  /  DBili  x   /  AST  17  /  ALT  <4<L>  /  AlkPhos  135<H>      PT/INR - ( 05 Sep 2017 08:38 )   PT: 15.1 sec;   INR: 1.33 ratio         PTT - ( 05 Sep 2017 08:38 )  PTT:34.9 sec      Urinalysis Basic - ( 03 Sep 2017 22:23 )    Color: x / Appearance: Clear / S.010 / pH: x  Gluc: x / Ketone: Negative  / Bili: Negative / Urobili: Negative   Blood: x / Protein: 100 mg/dL / Nitrite: Negative   Leuk Esterase: Negative / RBC: 0-2 /HPF / WBC 2-5 /HPF   Sq Epi: x / Non Sq Epi: x / Bacteria: Few /HPF

## 2017-09-05 NOTE — CHART NOTE - NSCHARTNOTEFT_GEN_A_CORE
Called by radiology for prelim CT abd/pelvis result. Found to have large mesenteric hematoma, no extravasation of contrast seen, and complex fluid in the abdomen. Evaluated pt at bedside. Pt denied any dizziness, HA, SOB, palpitations, CP, N/V. She endorsed feeling like her belly has been getting more distended throughout the day but no acute changes in the past few hours. On exam, , /74, RR 20, SpO2 95% on RA. Pt lying comfortably in bed in NAD. Lungs CTAB, RRR, no m/g/r, abdomen distended, pigtail filled with serosanguinous fluid, soft, diffusely tender to palpation.    Spoke with general surgery consult team. As pt is hemodynamically stable, there is no changes to plan of care overnight. They will see pt in the morning. Will c/w q12h CBCs, monitor VS and reconsult surgery if pt has any acute changes in status.

## 2017-09-05 NOTE — PROGRESS NOTE ADULT - SUBJECTIVE AND OBJECTIVE BOX
[facility] DIVISION OF KIDNEY DISEASES AND HYPERTENSION -- FOLLOW UP NOTE  --------------------------------------------------------------------------------  Chief Complaint:  Abdominal pain, hemoperitoneum    24 hour events/subjective:  patient reports continue abdominal pain at rest, pain with PO intake, pain with movements.          PAST HISTORY  --------------------------------------------------------------------------------  No significant changes to PMH, PSH, FHx, SHx, unless otherwise noted    ALLERGIES & MEDICATIONS  --------------------------------------------------------------------------------  Allergies    No Known Allergies    Intolerances      Standing Inpatient Medications  cefepime  IVPB   IV Intermittent   cefepime  IVPB 500 milliGRAM(s) IV Intermittent every 48 hours  amLODIPine   Tablet 10 milliGRAM(s) Oral daily  senna 2 Tablet(s) Oral at bedtime  docusate sodium 100 milliGRAM(s) Oral two times a day  sodium bicarbonate 650 milliGRAM(s) Oral four times a day    PRN Inpatient Medications  acetaminophen   Tablet. 650 milliGRAM(s) Oral every 6 hours PRN  HYDROmorphone  Injectable 0.5 milliGRAM(s) IV Push every 4 hours PRN  oxyCODONE    IR 2.5 milliGRAM(s) Oral daily PRN      REVIEW OF SYSTEMS  --------------------------------------------------------------------------------  Gen: No fevers/chills  Skin: No rashes  Head/Eyes/Ears/Mouth: +headache with exertion  Respiratory: No dyspnea  CV: No chest pain  GI: +abdominal pain  : No dysuria  MSK: No edema  Neuro: +lightheadedness with exertion    VITALS/PHYSICAL EXAM  --------------------------------------------------------------------------------  T(C): 37.2 (09-05-17 @ 08:19), Max: 37.9 (09-04-17 @ 16:00)  HR: 99 (09-05-17 @ 08:19) (96 - 119)  BP: 114/76 (09-05-17 @ 08:19) (112/84 - 146/78)  RR: 18 (09-05-17 @ 08:19) (16 - 26)  SpO2: 93% (09-05-17 @ 08:19) (93% - 98%)  Wt(kg): --  Height (cm): 157.48 (09-03-17 @ 18:45)  Weight (kg): 56.7 (09-03-17 @ 18:45)  BMI (kg/m2): 22.9 (09-03-17 @ 18:45)  BSA (m2): 1.57 (09-03-17 @ 18:45)      09-04-17 @ 07:01  -  09-05-17 @ 07:00  --------------------------------------------------------  IN: 1700 mL / OUT: 1310 mL / NET: 390 mL    09-05-17 @ 07:01  -  09-05-17 @ 13:28  --------------------------------------------------------  IN: 350 mL / OUT: 0 mL / NET: 350 mL      Physical Exam:  	Gen: NAD  	HEENT: MMM  	Pulm: CTA B/L  	CV: RRR, S1S2; no rub  	Abd: +BS, soft, +tenderness to palpation, visible blood in PD catheter  	: No suprapubic tenderness  	UE:  no edema  	LE:  no pitting edema  	Neuro: No focal deficits  	Psych: Normal affect and mood  	Skin: Warm    LABS/STUDIES  --------------------------------------------------------------------------------              7.6    17.26 >-----------<  373      [09-05-17 @ 08:36]              25.6     137  |  100  |  42  ----------------------------<  91      [09-05-17 @ 08:46]  3.9   |  23  |  4.27        Ca     7.4     [09-05-17 @ 08:46]      Mg     1.6     [09-05-17 @ 08:46]      Phos  5.1     [09-05-17 @ 08:46]    TPro  5.8  /  Alb  2.7  /  TBili  1.9  /  DBili  x   /  AST  17  /  ALT  <4  /  AlkPhos  135  [09-05-17 @ 08:46]    PT/INR: PT 15.1 , INR 1.33       [09-05-17 @ 08:38]  PTT: 34.9       [09-05-17 @ 08:38]    Creatinine Trend:  SCr 4.27 [09-05 @ 08:46]  SCr 4.52 [09-04 @ 16:43]  SCr 4.71 [09-04 @ 08:12]  SCr 4.91 [09-04 @ 04:11]  SCr 4.95 [09-03 @ 23:40]

## 2017-09-05 NOTE — PROGRESS NOTE ADULT - SUBJECTIVE AND OBJECTIVE BOX
[facility] Daily Progress Note    Pt seen and examined at bedside. Still having some pain but states it has improved greatly. No nausea or emesis; tolerating diet. Passing flatus, has not yet had any bowel movements. Endorses some lightheadedness when she sits up too quickly but denies any other symptoms. No additional complaints at this time.    PE  Gen: NAD AOx4  Abd: soft, tender in b.l lower quadrants and epigastric region, slight guarding upon palpation in LLQ    Vital Signs Last 24 Hrs  T(C): 36.8 (17 @ 04:21), Max: 37.9 (17 @ 16:00)  T(F): 98.3 (17 @ 04:21), Max: 100.3 (17 @ 16:00)  HR: 96 (17 @ 04:21) (96 - 119)  BP: 135/77 (17 @ 04:21) (112/84 - 152/72)  BP(mean): 106 (17 @ 23:00) (92 - 111)  RR: 16 (17 @ 04:21) (16 - 26)  SpO2: 93% (17 @ 04:21) (93% - 98%)  I&O's Detail    03 Sep 2017 07:  -  04 Sep 2017 07:00  --------------------------------------------------------  IN:    dextrose 5%: 400 mL    Solution: 100 mL  Total IN: 500 mL    OUT:    Indwelling Catheter - Urethral: 1300 mL  Total OUT: 1300 mL    Total NET: -800 mL      04 Sep 2017 07:  -  05 Sep 2017 06:36  --------------------------------------------------------  IN:    dextrose 5%: 800 mL    Oral Fluid: 900 mL  Total IN: 1700 mL    OUT:    Indwelling Catheter - Urethral: 460 mL    Voided: 850 mL  Total OUT: 1310 mL    Total NET: 390 mL                              8.8    22.7  )-----------( 396      ( 04 Sep 2017 16:43 )             28.2         142  |  104  |  44<H>  ----------------------------<  125<H>  4.0   |  22  |  4.52<H>    Ca    7.3<L>      04 Sep 2017 16:43  Phos  5.0       Mg     1.7         TPro  5.5<L>  /  Alb  3.0<L>  /  TBili  0.9  /  DBili  x   /  AST  11  /  ALT  6<L>  /  AlkPhos  116      PT/INR - ( 03 Sep 2017 19:53 )   PT: 13.3 sec;   INR: 1.22 ratio         PTT - ( 03 Sep 2017 19:53 )  PTT:33.1 sec  CAPILLARY BLOOD GLUCOSE  125 (04 Sep 2017 16:00)  116 (04 Sep 2017 08:00)        Urinalysis Basic - ( 03 Sep 2017 22:23 )    Color: x / Appearance: Clear / S.010 / pH: x  Gluc: x / Ketone: Negative  / Bili: Negative / Urobili: Negative   Blood: x / Protein: 100 mg/dL / Nitrite: Negative   Leuk Esterase: Negative / RBC: 0-2 /HPF / WBC 2-5 /HPF   Sq Epi: x / Non Sq Epi: x / Bacteria: Few /HPF      MEDICATIONS  (STANDING):  cefepime  IVPB   IV Intermittent   cefepime  IVPB 500 milliGRAM(s) IV Intermittent every 48 hours  amLODIPine   Tablet 10 milliGRAM(s) Oral daily  senna 2 Tablet(s) Oral at bedtime  docusate sodium 100 milliGRAM(s) Oral two times a day  sodium bicarbonate 650 milliGRAM(s) Oral four times a day    MEDICATIONS  (PRN):  acetaminophen   Tablet. 650 milliGRAM(s) Oral every 6 hours PRN headache  HYDROmorphone  Injectable 0.5 milliGRAM(s) IV Push every 4 hours PRN Severe Pain (7 - 10)  oxyCODONE    IR 2.5 milliGRAM(s) Oral daily PRN Moderate Pain (4 - 6)

## 2017-09-05 NOTE — PROGRESS NOTE ADULT - PROBLEM SELECTOR PLAN 1
Hemoglobin continues to fall.  Surgery recommends CT A/P with IV contrast to evaluate for source of bleed.  Cleared by renal for CT A/P with IV contrast.  Will hold dialysis at this time and monitor for MIRIAN after contrast.  Patient is aware of risks.  If patient develops worsening renal failure after IV contrast then will start hemodialysis. Hemoglobin continues to fall.  Surgery recommends CT A/P with IV contrast to evaluate for source of bleed.  Cleared by renal for CT A/P with IV contrast.  Will hold dialysis at this time and monitor renal function closely   after contrast.  Patient is aware of risks.  If patient develops worsening renal failure after IV contrast then will start hemodialysis.

## 2017-09-06 DIAGNOSIS — K86.9 DISEASE OF PANCREAS, UNSPECIFIED: ICD-10-CM

## 2017-09-06 LAB
ALBUMIN SERPL ELPH-MCNC: 2.8 G/DL — LOW (ref 3.3–5)
ALP SERPL-CCNC: 121 U/L — HIGH (ref 40–120)
ALT FLD-CCNC: 8 U/L RC — LOW (ref 10–45)
ANION GAP SERPL CALC-SCNC: 12 MMOL/L — SIGNIFICANT CHANGE UP (ref 5–17)
AST SERPL-CCNC: 12 U/L — SIGNIFICANT CHANGE UP (ref 10–40)
BILIRUB SERPL-MCNC: 1.6 MG/DL — HIGH (ref 0.2–1.2)
BLD GP AB SCN SERPL QL: NEGATIVE — SIGNIFICANT CHANGE UP
BUN SERPL-MCNC: 42 MG/DL — HIGH (ref 7–23)
CALCIUM SERPL-MCNC: 7.2 MG/DL — LOW (ref 8.4–10.5)
CHLORIDE SERPL-SCNC: 98 MMOL/L — SIGNIFICANT CHANGE UP (ref 96–108)
CO2 SERPL-SCNC: 24 MMOL/L — SIGNIFICANT CHANGE UP (ref 22–31)
CREAT SERPL-MCNC: 4.51 MG/DL — HIGH (ref 0.5–1.3)
GLUCOSE SERPL-MCNC: 115 MG/DL — HIGH (ref 70–99)
HCT VFR BLD CALC: 25.2 % — LOW (ref 34.5–45)
HCT VFR BLD CALC: 30.9 % — LOW (ref 34.5–45)
HGB BLD-MCNC: 10 G/DL — LOW (ref 11.5–15.5)
HGB BLD-MCNC: 8.2 G/DL — LOW (ref 11.5–15.5)
MAGNESIUM SERPL-MCNC: 1.6 MG/DL — SIGNIFICANT CHANGE UP (ref 1.6–2.6)
MCHC RBC-ENTMCNC: 24.5 PG — LOW (ref 27–34)
MCHC RBC-ENTMCNC: 24.7 PG — LOW (ref 27–34)
MCHC RBC-ENTMCNC: 32.3 GM/DL — SIGNIFICANT CHANGE UP (ref 32–36)
MCHC RBC-ENTMCNC: 32.4 GM/DL — SIGNIFICANT CHANGE UP (ref 32–36)
MCV RBC AUTO: 76 FL — LOW (ref 80–100)
MCV RBC AUTO: 76.2 FL — LOW (ref 80–100)
PHOSPHATE SERPL-MCNC: 4.9 MG/DL — HIGH (ref 2.5–4.5)
PLATELET # BLD AUTO: 401 K/UL — HIGH (ref 150–400)
PLATELET # BLD AUTO: 647 K/UL — HIGH (ref 150–400)
POTASSIUM SERPL-MCNC: 4.1 MMOL/L — SIGNIFICANT CHANGE UP (ref 3.5–5.3)
POTASSIUM SERPL-SCNC: 4.1 MMOL/L — SIGNIFICANT CHANGE UP (ref 3.5–5.3)
PROT SERPL-MCNC: 5.7 G/DL — LOW (ref 6–8.3)
RBC # BLD: 3.31 M/UL — LOW (ref 3.8–5.2)
RBC # BLD: 4.06 M/UL — SIGNIFICANT CHANGE UP (ref 3.8–5.2)
RBC # FLD: 21 % — HIGH (ref 10.3–14.5)
RBC # FLD: 21.3 % — HIGH (ref 10.3–14.5)
RH IG SCN BLD-IMP: POSITIVE — SIGNIFICANT CHANGE UP
SODIUM SERPL-SCNC: 134 MMOL/L — LOW (ref 135–145)
WBC # BLD: 17.2 K/UL — HIGH (ref 3.8–10.5)
WBC # BLD: 25 K/UL — HIGH (ref 3.8–10.5)
WBC # FLD AUTO: 17.2 K/UL — HIGH (ref 3.8–10.5)
WBC # FLD AUTO: 25 K/UL — HIGH (ref 3.8–10.5)

## 2017-09-06 PROCEDURE — 99233 SBSQ HOSP IP/OBS HIGH 50: CPT | Mod: GC

## 2017-09-06 PROCEDURE — 99222 1ST HOSP IP/OBS MODERATE 55: CPT

## 2017-09-06 RX ORDER — GENTAMICIN SULFATE 0.1 %
1 OINTMENT (GRAM) TOPICAL ONCE
Qty: 0 | Refills: 0 | Status: COMPLETED | OUTPATIENT
Start: 2017-09-06 | End: 2017-09-06

## 2017-09-06 RX ADMIN — Medication 100 MILLIGRAM(S): at 17:04

## 2017-09-06 RX ADMIN — Medication 100 MILLIGRAM(S): at 06:00

## 2017-09-06 RX ADMIN — Medication 650 MILLIGRAM(S): at 01:43

## 2017-09-06 RX ADMIN — Medication 650 MILLIGRAM(S): at 11:34

## 2017-09-06 RX ADMIN — AMLODIPINE BESYLATE 10 MILLIGRAM(S): 2.5 TABLET ORAL at 06:00

## 2017-09-06 RX ADMIN — Medication 650 MILLIGRAM(S): at 17:04

## 2017-09-06 RX ADMIN — Medication 1 APPLICATION(S): at 12:53

## 2017-09-06 RX ADMIN — OXYCODONE HYDROCHLORIDE 2.5 MILLIGRAM(S): 5 TABLET ORAL at 21:17

## 2017-09-06 RX ADMIN — Medication 650 MILLIGRAM(S): at 06:00

## 2017-09-06 RX ADMIN — SENNA PLUS 2 TABLET(S): 8.6 TABLET ORAL at 21:42

## 2017-09-06 RX ADMIN — OXYCODONE HYDROCHLORIDE 2.5 MILLIGRAM(S): 5 TABLET ORAL at 20:47

## 2017-09-06 NOTE — PROGRESS NOTE ADULT - PROBLEM SELECTOR PLAN 3
Holding PD currently  - Continue to follow nephrology recommendations Holding PD currently  - Pending peritoneal lavage  - Continue to follow nephrology recommendations  - Plan to transfer to [address] [place] [address] for dialysis nursing staff

## 2017-09-06 NOTE — PROGRESS NOTE ADULT - PROBLEM SELECTOR PLAN 1
Currently blood pressure is stable, with mild tachycardia likely from underlying anemia. Possible causes include traction on PD catheter insertion site, splenic injury or herald bleed from undiagnosed mesenteric artery aneurysm.   - CT abdomen/pelvis with hemoperitoneum and multiple hematomas  - Touch base with surgery regarding CT abdomen/pelvis  - Continue Q12 CBCs

## 2017-09-06 NOTE — PROGRESS NOTE ADULT - SUBJECTIVE AND OBJECTIVE BOX
[facility] Progress Note    Pt seen and examined at bedside. Feels pain is improving since yesterday. No nausea or emesis overnight. Tolerating diet. Lightheadness from yesterday has resolved. Ambulating and voiding. No other complaints at this time.    Underwent CT scan last night, prelim read shows 6.1 x 8.0 x 9.1 cm hematoma without active extravasation.    PE  Gen: NAD, AOx4  Abd: soft, slightly tender to palpation x 4 quadrants. No rebound or guarding    Vital Signs Last 24 Hrs  T(C): 36.9 (09-06-17 @ 04:19), Max: 37.7 (09-05-17 @ 14:00)  T(F): 98.4 (09-06-17 @ 04:19), Max: 99.9 (09-05-17 @ 14:00)  HR: 102 (09-06-17 @ 04:19) (99 - 108)  BP: 118/76 (09-06-17 @ 04:19) (110/74 - 122/77)  BP(mean): --  RR: 16 (09-06-17 @ 04:19) (16 - 20)  SpO2: 96% (09-06-17 @ 04:19) (93% - 97%)  I&O's Detail    04 Sep 2017 07:01  -  05 Sep 2017 07:00  --------------------------------------------------------  IN:    dextrose 5%: 800 mL    Oral Fluid: 900 mL  Total IN: 1700 mL    OUT:    Indwelling Catheter - Urethral: 460 mL    Voided: 850 mL  Total OUT: 1310 mL    Total NET: 390 mL      05 Sep 2017 07:01  -  06 Sep 2017 06:23  --------------------------------------------------------  IN:    Oral Fluid: 1610 mL    Solution: 50 mL  Total IN: 1660 mL    OUT:    Voided: 450 mL  Total OUT: 450 mL    Total NET: 1210 mL                              8.2    17.2  )-----------( 401      ( 06 Sep 2017 02:39 )             25.2     09-06    134<L>  |  98  |  42<H>  ----------------------------<  115<H>  4.1   |  24  |  4.51<H>    Ca    7.2<L>      06 Sep 2017 02:39  Phos  4.9     09-06  Mg     1.6     09-06    TPro  5.7<L>  /  Alb  2.8<L>  /  TBili  1.6<H>  /  DBili  x   /  AST  12  /  ALT  8<L>  /  AlkPhos  121<H>  09-06    PT/INR - ( 05 Sep 2017 08:38 )   PT: 15.1 sec;   INR: 1.33 ratio         PTT - ( 05 Sep 2017 08:38 )  PTT:34.9 sec  CAPILLARY BLOOD GLUCOSE  102 (05 Sep 2017 08:19)          MEDICATIONS  (STANDING):  cefepime  IVPB   IV Intermittent   cefepime  IVPB 500 milliGRAM(s) IV Intermittent every 48 hours  amLODIPine   Tablet 10 milliGRAM(s) Oral daily  senna 2 Tablet(s) Oral at bedtime  docusate sodium 100 milliGRAM(s) Oral two times a day  sodium bicarbonate 650 milliGRAM(s) Oral four times a day    MEDICATIONS  (PRN):  acetaminophen   Tablet. 650 milliGRAM(s) Oral every 6 hours PRN headache  HYDROmorphone  Injectable 0.5 milliGRAM(s) IV Push every 4 hours PRN Severe Pain (7 - 10)  oxyCODONE    IR 2.5 milliGRAM(s) Oral daily PRN Moderate Pain (4 - 6)

## 2017-09-06 NOTE — PROGRESS NOTE ADULT - PROBLEM SELECTOR PLAN 4
Multiple pancreatic lesions  - Unclear if chronic or new, as previous scans were without contrast. One known lesion on prior imaging. Please see MRI in 12/2014: 1.4  cm  pancreatic  cystic  lesion  concerning  for  neoplasm  in  the  body  of  the pancreas.

## 2017-09-06 NOTE — PROGRESS NOTE ADULT - PROBLEM SELECTOR PLAN 1
CT A/P reviewed independently.  Surgical input appreciated.  Monitor hematoma per surgery.  Patient with continued exquisite abdominal tenderness with concern for peritonitis / infected hematoma with leukocytosis.  Need a culture of peritoneal fluid.  Can attempt to drain small amount of blood from abdomen versus small volume peritoneal dwell, 500 cc x 4 hours to assess for intraperitoneal growth.  Will need to discuss with surgery.

## 2017-09-06 NOTE — CONSULT NOTE ADULT - SUBJECTIVE AND OBJECTIVE BOX
"HPI: This is a 58 yo F PMH HTN, Polycythemia Vera on Hydroxyurea, and ESRD on peritoneal dialysis sp catheter placement in August 2017 transferred from [facility] for further management of peritonitis/hemoperitoneum.  Patient presented to OSH c/o abdominal tenderness and multiple syncopal episodes.  Patient reports she was at home last night.  From 1-6pm she had indwelling peritoneal fluid, and it drained clear.  At 11pm she was having dessert when she began to experience extreme abdominal pain. The pain was described as constant, stabbing pain, 10/10 that was worse with movement.  She reports becoming weak and almost losing consciousness.  Later that evening, patient reports a second episode of syncope while having a bowel movement.  She and her family noted blood in the peritoneal catheter at that time.  At that time, her family called EMS and she was taken to [facility].    At [facility], patient hypotensive to 90s requiring 1.5L IVF for fluid resuscitation. CT Abdomen/Pelvis showing: moderate amount of fluid in the abdomen and pelvis with collections of hematoma/hemorrhage, largest collections in the pelvis. Peritoneal fluid sent for analysis showing large red cell count (3,139,000) and white blood cell count (7478), negative gram stain.  Initial Hgb 10.4 dropped to 7.6 requiring transfusion of one unit of PRBC.  Patient also noted to have leukocytosis to 31.8 treated with Vancomycin and Ceftriaxone x 1, then continued on Cefepime for possible SBP. "    Above reviewed. Saw and spoke to patient. Patient confirms, had PD catheter placed 8/8/17, had 2 weeks of healing, then had PD training. 5 days into using PD catheter, had significant abd pain, had syncope, and went to [facility]--there was found to have hemoperitoneum. On transfer here, patient noted to have high leukocytosis, abd pain, one low grade fever to 100.3F. Today, patient with persistent but improved abd pain, and peritoneal fluid appears to be clearing (no long frankly bloody). No other complaints such as chest pain/SOB, no diarrhea, no dysuria, no pyuria. No other new complaints. Overall well.    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 650 milliGRAM(s) Oral every 6 hours PRN  HYDROmorphone  Injectable 0.5 milliGRAM(s) IV Push every 4 hours PRN  oxyCODONE    IR 2.5 milliGRAM(s) Oral daily PRN  sodium bicarbonate 650 milliGRAM(s) Oral four times a day  gentamicin 0.1% Ointment 1 Application(s) Topical once    SOCIAL HISTORY: No tobacco, no alcohol, no illicit drugs     FAMILY HISTORY:  Family history of malignant neoplasm (Grandparent)  Family history of hypertension in mother    Drug Dosing Weight  Height (cm): 157.48 (03 Sep 2017 18:45)  Weight (kg): 56.7 (03 Sep 2017 18:45)  BMI (kg/m2): 22.9 (03 Sep 2017 18:45)  BSA (m2): 1.57 (03 Sep 2017 18:45)    PE:    Vital Signs Last 24 Hrs  T(C): 36.7 (06 Sep 2017 08:00), Max: 36.9 (05 Sep 2017 16:59)  T(F): 98.1 (06 Sep 2017 08:00), Max: 98.5 (05 Sep 2017 16:59)  HR: 95 (06 Sep 2017 08:00) (95 - 106)  BP: 104/68 (06 Sep 2017 08:00) (104/68 - 122/77)  BP(mean): --  RR: 18 (06 Sep 2017 08:00) (16 - 20)  SpO2: 96% (06 Sep 2017 08:00) (93% - 96%)    Gen: AOx3, NAD, non-toxic, pleasant  CV: S1+S2 normal, no murmurs, nontachycardic  Resp: Clear bilat, no resp distress, no crackles/wheezes  Abd: Soft, nontender, +BS  Ext: No LE edema, no wounds  : No St[place], no suprapubic tenderness  IV/Skin: No thrombophlebitis  Neuro: No sensory deficits, no focal deficits    LABS:                        8.2    17.2  )-----------( 401      ( 06 Sep 2017 02:39 )             25.2     09-06    134<L>  |  98  |  42<H>  ----------------------------<  115<H>  4.1   |  24  |  4.51<H>    Ca    7.2<L>      06 Sep 2017 02:39  Phos  4.9     09-06  Mg     1.6     09-06    TPro  5.7<L>  /  Alb  2.8<L>  /  TBili  1.6<H>  /  DBili  x   /  AST  12  /  ALT  8<L>  /  AlkPhos  121<H>  09-06    MICROBIOLOGY:    9/4 BCX x2 NGTD    [facility] for analysis showing large red cell count (3,139,000) and white blood cell count (7478), negative gram stain.     RADIOLOGY:    CT A/P 9/5:    FINDINGS:    LOWER CHEST: Small bilateral pleural effusions with compressive   atelectasis or consolidation.    LIVER: Hepatic steatosis.  BILE DUCTS: Normal caliber.  GALLBLADDER: Within normal limits.  SPLEEN: Stable splenomegaly measuring approximately 16.9 cm. Blood   density fluid collection around the spleen.  PANCREAS: Multiple enhancing lobulated lesions extending from body to the   tail sparing the pancreatic head. Per previous report, the patient has   known pancreatic lesions.  ADRENALS: Within normal limits.  KIDNEYS/URETERS: Mild atrophic bilateral kidneys. A subcentimeter   well-corticated round lesion within the left kidney. No hydronephrosis.    BLADDER: Compressed due to a large hematoma above the bladder.  REPRODUCTIVE ORGANS: Adnexa not well visualized due to surrounding   hematoma. Uterus is within normal limits.    BOWEL: No bowel obstruction.  PERITONEUM/ABDOMINAL WALL: Moderate hemoperitoneum in the pelvis and   small upper abdominal hemoperitoneum. Focal heterogeneously hyperdense   peritoneal hematomas. For example:  *  Epigastric, measuring 6.0 x 8.2 x 11.6 cm  *  Left hemipelvis/left lower abdomen, measuring 7.2 x 5.5 x 4.3 cm, a   second component along the medial left pelvic sidewall measures 3.4 x 2.2   x 2.5 cm  Right lower quadrant anterior approach peritoneal dialysis catheter   coiled in the right hemipelvis. Few droplets of extraluminal air are   present, consistent with catheter.  VESSELS: Gastric and perisplenic varices. Collapsed IVC.  RETROPERITONEUM: No lymphadenopathy.    BONES: Mild degenerative changes.

## 2017-09-06 NOTE — PROGRESS NOTE ADULT - ASSESSMENT
57y year old Female who presents with hemoperitoneum, patient had ESRD with peritoneal dialysis catheter placed on August, presented to OSH with bleeding in abdomen, transferred to [facility] for further management, now with stable H/H x 72 hours    CT scan done yesterday shows large hematoma in abdomen. Pt clincally improving however overnight has been tachycardic to low 100s.    - Continue q12 H/H checks, transfuse as necessary    DUANE Zayas PGY 2  [id number] 57y year old Female who presents with hemoperitoneum, patient had ESRD with peritoneal dialysis catheter placed on August, presented to OSH with bleeding in abdomen, transferred to [facility] for further management, now with stable H/H x 72 hours    CT scan done yesterday shows large hematoma in abdomen. Pt clincally improving however overnight has been tachycardic to low 100s.    - Continue q12 H/H checks, transfuse as necessary  - Without active extrav, hematoma can be monitored,   - No surgical intervention at this time  - Discussed with Dr. Héctor Zayas PGY 2  [id number]

## 2017-09-06 NOTE — PROGRESS NOTE ADULT - ATTENDING COMMENTS
58 yo female with recent new start PD (Sachdeva), chronic GN, polycythemia vera was at home on Sat completed PD and drained without any difficulty  6 hours later pt reported sharp pain while at home felt weak and then [facility]  The catheter was manipulated at that time and was noted to have hemoperitoneum and CT confirmed that  Given abx and transfused 1 unit    ESRD: on peritoneal dialysis  Currently with stable electrolytes  No urgent indication for dialysis today    Hemoperitoneum: No obvious etiology identified on the CT scan. No active bleeding    Hct  stable  Will do  Peritoneal lavage and also send the fluid for culture   If and when dialysis is needed, will transition to HD  Continue with Antibiotics

## 2017-09-06 NOTE — PROGRESS NOTE ADULT - PROBLEM SELECTOR PLAN 5
Monitor calcium and phos.  Phos is currently at goal, likely due to poor PO intake.  Would start phoslo 1 tab TID with meals with PO intake improves.

## 2017-09-06 NOTE — PROGRESS NOTE ADULT - SUBJECTIVE AND OBJECTIVE BOX
[facility] DIVISION OF KIDNEY DISEASES AND HYPERTENSION -- FOLLOW UP NOTE  --------------------------------------------------------------------------------  Chief Complaint:  ESRD on PD with hemoperitoneum    24 hour events/subjective:  patient reports feeling much better today, headaches have improved, generalized malaise has improved.  Reports continue abdominal pain, pain with PO intake has improved.        PAST HISTORY  --------------------------------------------------------------------------------  No significant changes to PMH, PSH, FHx, SHx, unless otherwise noted    ALLERGIES & MEDICATIONS  --------------------------------------------------------------------------------  Allergies    No Known Allergies    Intolerances      Standing Inpatient Medications  cefepime  IVPB   IV Intermittent   cefepime  IVPB 500 milliGRAM(s) IV Intermittent every 48 hours  amLODIPine   Tablet 10 milliGRAM(s) Oral daily  senna 2 Tablet(s) Oral at bedtime  docusate sodium 100 milliGRAM(s) Oral two times a day  sodium bicarbonate 650 milliGRAM(s) Oral four times a day    PRN Inpatient Medications  acetaminophen   Tablet. 650 milliGRAM(s) Oral every 6 hours PRN  HYDROmorphone  Injectable 0.5 milliGRAM(s) IV Push every 4 hours PRN  oxyCODONE    IR 2.5 milliGRAM(s) Oral daily PRN      REVIEW OF SYSTEMS  --------------------------------------------------------------------------------  Gen: No fevers/chills, + malaise improving  Skin: No rashes  Head/Eyes/Ears/Mouth: No headaches yet this morning  Respiratory: No dyspnea  CV: No chest pain  GI: +abdominal pain, but no pain with PO intake  : No dysuria  MSK: no edema  Neuro: No dizziness/lightheadedness    VITALS/PHYSICAL EXAM  --------------------------------------------------------------------------------  T(C): 36.9 (09-06-17 @ 04:19), Max: 37.7 (09-05-17 @ 14:00)  HR: 102 (09-06-17 @ 04:19) (100 - 108)  BP: 118/76 (09-06-17 @ 04:19) (110/74 - 122/77)  RR: 16 (09-06-17 @ 04:19) (16 - 20)  SpO2: 96% (09-06-17 @ 04:19) (93% - 97%)  Wt(kg): --        09-05-17 @ 07:01  -  09-06-17 @ 07:00  --------------------------------------------------------  IN: 1660 mL / OUT: 450 mL / NET: 1210 mL      Physical Exam:  	Gen: NAD  	HEENT: MMM  	Pulm: CTA B/L  	CV: RRR, S1S2; no rub  	Abd: +BS, soft, +voluntary guarding, +tenderness to palpation  	: No suprapubic tenderness  	UE:  no edema  	LE:  mild nonpitting edema   	Neuro: No focal deficits  	Psych: Normal affect and mood  	Skin: Warm  	Vascular access:    LABS/STUDIES  --------------------------------------------------------------------------------              8.2    17.2  >-----------<  401      [09-06-17 @ 02:39]              25.2     134  |  98  |  42  ----------------------------<  115      [09-06-17 @ 02:39]  4.1   |  24  |  4.51        Ca     7.2     [09-06-17 @ 02:39]      Mg     1.6     [09-06-17 @ 02:39]      Phos  4.9     [09-06-17 @ 02:39]    TPro  5.7  /  Alb  2.8  /  TBili  1.6  /  DBili  x   /  AST  12  /  ALT  8   /  AlkPhos  121  [09-06-17 @ 02:39]    PT/INR: PT 15.1 , INR 1.33       [09-05-17 @ 08:38]  PTT: 34.9       [09-05-17 @ 08:38]      Creatinine Trend:  SCr 4.51 [09-06 @ 02:39]  SCr 4.27 [09-05 @ 08:46]  SCr 4.52 [09-04 @ 16:43]  SCr 4.71 [09-04 @ 08:12]  SCr 4.91 [09-04 @ 04:11]

## 2017-09-06 NOTE — PROGRESS NOTE ADULT - ASSESSMENT
Patient is a 58 y/o F w/ ESRD on HD who presented to OSH with abdominal pain, found with significant hemoperitoneum with unclear source of bleeding.

## 2017-09-06 NOTE — PROGRESS NOTE ADULT - ASSESSMENT
56 yo F pmhx  HTN, polycythemia vera, ESRD on PD (2/2 chronic GN) p/w hemoperitoneum and symptomatic hemorrhagic anemia.

## 2017-09-06 NOTE — PROGRESS NOTE ADULT - SUBJECTIVE AND OBJECTIVE BOX
Patient is a 57y old  Female who presents with a chief complaint of Peritonitis, Hemoperitoneum (03 Sep 2017 20:09)      SUBJECTIVE / OVERNIGHT EVENTS:    MEDICATIONS  (STANDING):  cefepime  IVPB   IV Intermittent   cefepime  IVPB 500 milliGRAM(s) IV Intermittent every 48 hours  amLODIPine   Tablet 10 milliGRAM(s) Oral daily  senna 2 Tablet(s) Oral at bedtime  docusate sodium 100 milliGRAM(s) Oral two times a day  sodium bicarbonate 650 milliGRAM(s) Oral four times a day    MEDICATIONS  (PRN):  acetaminophen   Tablet. 650 milliGRAM(s) Oral every 6 hours PRN headache  HYDROmorphone  Injectable 0.5 milliGRAM(s) IV Push every 4 hours PRN Severe Pain (7 - 10)  oxyCODONE    IR 2.5 milliGRAM(s) Oral daily PRN Moderate Pain (4 - 6)      CAPILLARY BLOOD GLUCOSE  102 (05 Sep 2017 08:19)        I&O's Summary    05 Sep 2017 07:01  -  06 Sep 2017 07:00  --------------------------------------------------------  IN: 1660 mL / OUT: 450 mL / NET: 1210 mL        PHYSICAL EXAM:  GENERAL: NAD, well-developed  HEAD:  Atraumatic, Normocephalic  EYES:  NECK:   CHEST/LUNG: Clear to auscultation bilaterally; No wheezes, rhonchi or gallops  HEART: Regular rate and rhythm; No murmurs, rubs, or gallops  ABDOMEN: Soft, Nontender, Nondistended; Bowel sounds present  EXTREMITIES:  2+ Peripheral Pulses, No clubbing, cyanosis, or edema  PSYCH: Appropriate  NEUROLOGY: non-focal  SKIN: No rashes or lesions    LABS:                        8.2    17.2  )-----------( 401      ( 06 Sep 2017 02:39 )             25.2     09-06    134<L>  |  98  |  42<H>  ----------------------------<  115<H>  4.1   |  24  |  4.51<H>    Ca    7.2<L>      06 Sep 2017 02:39  Phos  4.9     09-06  Mg     1.6     09-06    TPro  5.7<L>  /  Alb  2.8<L>  /  TBili  1.6<H>  /  DBili  x   /  AST  12  /  ALT  8<L>  /  AlkPhos  121<H>  09-06    PT/INR - ( 05 Sep 2017 08:38 )   PT: 15.1 sec;   INR: 1.33 ratio         PTT - ( 05 Sep 2017 08:38 )  PTT:34.9 sec          RADIOLOGY & ADDITIONAL TESTS:    Imaging Personally Reviewed:    Consultant(s) Notes Reviewed:      Care Discussed with Consultants/Other Providers: Patient is a 57y old  Female who presents with a chief complaint of Peritonitis, Hemoperitoneum (03 Sep 2017 20:09)      SUBJECTIVE / OVERNIGHT EVENTS:  Had CT with IV contrast overnight. No shortness of breath. Improved abdominal pain after flatus. No bowel movement.        MEDICATIONS  (STANDING):  cefepime  IVPB   IV Intermittent   cefepime  IVPB 500 milliGRAM(s) IV Intermittent every 48 hours  amLODIPine   Tablet 10 milliGRAM(s) Oral daily  senna 2 Tablet(s) Oral at bedtime  docusate sodium 100 milliGRAM(s) Oral two times a day  sodium bicarbonate 650 milliGRAM(s) Oral four times a day    MEDICATIONS  (PRN):  acetaminophen   Tablet. 650 milliGRAM(s) Oral every 6 hours PRN headache  HYDROmorphone  Injectable 0.5 milliGRAM(s) IV Push every 4 hours PRN Severe Pain (7 - 10)  oxyCODONE    IR 2.5 milliGRAM(s) Oral daily PRN Moderate Pain (4 - 6)      CAPILLARY BLOOD GLUCOSE  102 (05 Sep 2017 08:19)      Vital Signs Last 24 Hrs  T(C): 36.7 (06 Sep 2017 08:00), Max: 37.7 (05 Sep 2017 14:00)  T(F): 98.1 (06 Sep 2017 08:00), Max: 99.9 (05 Sep 2017 14:00)  HR: 95 (06 Sep 2017 08:00) (95 - 108)  BP: 104/68 (06 Sep 2017 08:00) (104/68 - 122/77)  BP(mean): --  RR: 18 (06 Sep 2017 08:00) (16 - 20)  SpO2: 96% (06 Sep 2017 08:00) (93% - 97%)    I&O's Summary    05 Sep 2017 07:01  -  06 Sep 2017 07:00  --------------------------------------------------------  IN: 1660 mL / OUT: 450 mL / NET: 1210 mL      PHYSICAL EXAM:  GENERAL: NAD, well-developed  HEAD:  Atraumatic, Normocephalic  CHEST/LUNG: Clear to auscultation bilaterally; No wheezes, rhonchi or gallops  HEART: Regular rate and rhythm; No murmurs, rubs, or gallops  ABDOMEN: Soft, distended, with abdominal pain and guarding, mild improvement  EXTREMITIES:  No edema  PSYCH: Appropriate  NEUROLOGY: non-focal  SKIN: No rashes or lesions    LABS:                        8.2    17.2  )-----------( 401      ( 06 Sep 2017 02:39 )             25.2     09-06    134<L>  |  98  |  42<H>  ----------------------------<  115<H>  4.1   |  24  |  4.51<H>    Ca    7.2<L>      06 Sep 2017 02:39  Phos  4.9     09-06  Mg     1.6     09-06    TPro  5.7<L>  /  Alb  2.8<L>  /  TBili  1.6<H>  /  DBili  x   /  AST  12  /  ALT  8<L>  /  AlkPhos  121<H>  09-06    PT/INR - ( 05 Sep 2017 08:38 )   PT: 15.1 sec;   INR: 1.33 ratio         PTT - ( 05 Sep 2017 08:38 )  PTT:34.9 sec

## 2017-09-06 NOTE — PROGRESS NOTE ADULT - PROBLEM SELECTOR PLAN 2
- Continue cefepime (started 9/3)  - Readdress cultures (unclear, no cultures in system, may be at OSH). Per documentation gram stain was negative.   - Readdress with renal peritoneal dialysis catheter given potential source of infection. - Continue cefepime (started 9/3)  - Readdress cultures (unclear, no cultures in system, may be at OSH). Per documentation gram stain was negative.   - Peritoneal culture today with lavage  - Readdress with renal peritoneal dialysis catheter given potential source of infection.

## 2017-09-07 LAB
ALBUMIN SERPL ELPH-MCNC: 2.7 G/DL — LOW (ref 3.3–5)
ALP SERPL-CCNC: 153 U/L — HIGH (ref 40–120)
ALT FLD-CCNC: 8 U/L — LOW (ref 10–45)
ANION GAP SERPL CALC-SCNC: 16 MMOL/L — SIGNIFICANT CHANGE UP (ref 5–17)
AST SERPL-CCNC: 28 U/L — SIGNIFICANT CHANGE UP (ref 10–40)
BASE EXCESS BLDV CALC-SCNC: -1.8 MMOL/L — SIGNIFICANT CHANGE UP (ref -2–2)
BILIRUB SERPL-MCNC: 1.3 MG/DL — HIGH (ref 0.2–1.2)
BUN SERPL-MCNC: 36 MG/DL — HIGH (ref 7–23)
CA-I SERPL-SCNC: 1.07 MMOL/L — LOW (ref 1.12–1.3)
CALCIUM SERPL-MCNC: 7.2 MG/DL — LOW (ref 8.4–10.5)
CHLORIDE BLDV-SCNC: 105 MMOL/L — SIGNIFICANT CHANGE UP (ref 96–108)
CHLORIDE SERPL-SCNC: 100 MMOL/L — SIGNIFICANT CHANGE UP (ref 96–108)
CO2 BLDV-SCNC: 24 MMOL/L — SIGNIFICANT CHANGE UP (ref 22–30)
CO2 SERPL-SCNC: 19 MMOL/L — LOW (ref 22–31)
CREAT SERPL-MCNC: 4.33 MG/DL — HIGH (ref 0.5–1.3)
GAS PNL BLDV: 136 MMOL/L — SIGNIFICANT CHANGE UP (ref 136–145)
GAS PNL BLDV: SIGNIFICANT CHANGE UP
GAS PNL BLDV: SIGNIFICANT CHANGE UP
GLUCOSE BLDV-MCNC: 91 MG/DL — SIGNIFICANT CHANGE UP (ref 70–99)
GLUCOSE SERPL-MCNC: 69 MG/DL — LOW (ref 70–99)
HCO3 BLDV-SCNC: 23 MMOL/L — SIGNIFICANT CHANGE UP (ref 21–29)
HCT VFR BLD CALC: 26.3 % — LOW (ref 34.5–45)
HCT VFR BLDA CALC: 26 % — LOW (ref 39–50)
HGB BLD CALC-MCNC: 8.3 G/DL — LOW (ref 11.5–15.5)
HGB BLD-MCNC: 8.4 G/DL — LOW (ref 11.5–15.5)
LACTATE BLDV-MCNC: 1.1 MMOL/L — SIGNIFICANT CHANGE UP (ref 0.7–2)
MAGNESIUM SERPL-MCNC: 1.9 MG/DL — SIGNIFICANT CHANGE UP (ref 1.6–2.6)
MCHC RBC-ENTMCNC: 24.6 PG — LOW (ref 27–34)
MCHC RBC-ENTMCNC: 31.8 GM/DL — LOW (ref 32–36)
MCV RBC AUTO: 77.2 FL — LOW (ref 80–100)
OTHER CELLS CSF MANUAL: 10 ML/DL — LOW (ref 18–22)
PCO2 BLDV: 41 MMHG — SIGNIFICANT CHANGE UP (ref 35–50)
PH BLDV: 7.36 — SIGNIFICANT CHANGE UP (ref 7.35–7.45)
PHOSPHATE SERPL-MCNC: 4.5 MG/DL — SIGNIFICANT CHANGE UP (ref 2.5–4.5)
PLATELET # BLD AUTO: 440 K/UL — HIGH (ref 150–400)
PO2 BLDV: 65 MMHG — HIGH (ref 25–45)
POTASSIUM BLDV-SCNC: 4.1 MMOL/L — SIGNIFICANT CHANGE UP (ref 3.5–5)
POTASSIUM SERPL-MCNC: 4.9 MMOL/L — SIGNIFICANT CHANGE UP (ref 3.5–5.3)
POTASSIUM SERPL-SCNC: 4.9 MMOL/L — SIGNIFICANT CHANGE UP (ref 3.5–5.3)
PROT SERPL-MCNC: 5.9 G/DL — LOW (ref 6–8.3)
RBC # BLD: 3.4 M/UL — LOW (ref 3.8–5.2)
RBC # FLD: 21.4 % — HIGH (ref 10.3–14.5)
SAO2 % BLDV: 91 % — HIGH (ref 67–88)
SODIUM SERPL-SCNC: 135 MMOL/L — SIGNIFICANT CHANGE UP (ref 135–145)
WBC # BLD: 15.7 K/UL — HIGH (ref 3.8–10.5)
WBC # FLD AUTO: 15.7 K/UL — HIGH (ref 3.8–10.5)

## 2017-09-07 PROCEDURE — 99232 SBSQ HOSP IP/OBS MODERATE 35: CPT

## 2017-09-07 PROCEDURE — 99233 SBSQ HOSP IP/OBS HIGH 50: CPT | Mod: GC

## 2017-09-07 RX ORDER — SIMETHICONE 80 MG/1
80 TABLET, CHEWABLE ORAL ONCE
Qty: 0 | Refills: 0 | Status: COMPLETED | OUTPATIENT
Start: 2017-09-07 | End: 2017-09-07

## 2017-09-07 RX ADMIN — SIMETHICONE 80 MILLIGRAM(S): 80 TABLET, CHEWABLE ORAL at 20:05

## 2017-09-07 RX ADMIN — Medication 650 MILLIGRAM(S): at 13:24

## 2017-09-07 RX ADMIN — Medication 650 MILLIGRAM(S): at 17:30

## 2017-09-07 RX ADMIN — Medication 650 MILLIGRAM(S): at 22:14

## 2017-09-07 RX ADMIN — AMLODIPINE BESYLATE 10 MILLIGRAM(S): 2.5 TABLET ORAL at 05:37

## 2017-09-07 RX ADMIN — Medication 650 MILLIGRAM(S): at 00:29

## 2017-09-07 RX ADMIN — CEFEPIME 100 MILLIGRAM(S): 1 INJECTION, POWDER, FOR SOLUTION INTRAMUSCULAR; INTRAVENOUS at 23:15

## 2017-09-07 RX ADMIN — Medication 100 MILLIGRAM(S): at 05:36

## 2017-09-07 RX ADMIN — SENNA PLUS 2 TABLET(S): 8.6 TABLET ORAL at 22:14

## 2017-09-07 RX ADMIN — Medication 100 MILLIGRAM(S): at 17:30

## 2017-09-07 RX ADMIN — Medication 650 MILLIGRAM(S): at 05:36

## 2017-09-07 NOTE — PROGRESS NOTE ADULT - SUBJECTIVE AND OBJECTIVE BOX
Patient is a 57y old  Female who presents with a chief complaint of Peritonitis, Hemoperitoneum (03 Sep 2017 20:09)      SUBJECTIVE / OVERNIGHT EVENTS:    MEDICATIONS  (STANDING):  cefepime  IVPB   IV Intermittent   cefepime  IVPB 500 milliGRAM(s) IV Intermittent every 48 hours  amLODIPine   Tablet 10 milliGRAM(s) Oral daily  senna 2 Tablet(s) Oral at bedtime  docusate sodium 100 milliGRAM(s) Oral two times a day  sodium bicarbonate 650 milliGRAM(s) Oral four times a day    MEDICATIONS  (PRN):  acetaminophen   Tablet. 650 milliGRAM(s) Oral every 6 hours PRN headache  HYDROmorphone  Injectable 0.5 milliGRAM(s) IV Push every 4 hours PRN Severe Pain (7 - 10)  oxyCODONE    IR 2.5 milliGRAM(s) Oral daily PRN Moderate Pain (4 - 6)      CAPILLARY BLOOD GLUCOSE        I&O's Summary    05 Sep 2017 07:01  -  06 Sep 2017 07:00  --------------------------------------------------------  IN: 1660 mL / OUT: 450 mL / NET: 1210 mL    06 Sep 2017 07:01  -  07 Sep 2017 06:59  --------------------------------------------------------  IN: 360 mL / OUT: 0 mL / NET: 360 mL        PHYSICAL EXAM:  GENERAL: NAD, well-developed  HEAD:  Atraumatic, Normocephalic  EYES:  NECK:   CHEST/LUNG: Clear to auscultation bilaterally; No wheezes, rhonchi or gallops  HEART: Regular rate and rhythm; No murmurs, rubs, or gallops  ABDOMEN: Soft, Nontender, Nondistended; Bowel sounds present  EXTREMITIES:  2+ Peripheral Pulses, No clubbing, cyanosis, or edema  PSYCH: Appropriate  NEUROLOGY: non-focal  SKIN: No rashes or lesions    LABS:                        8.4    15.7  )-----------( 440      ( 07 Sep 2017 06:38 )             26.3     09-06    134<L>  |  98  |  42<H>  ----------------------------<  115<H>  4.1   |  24  |  4.51<H>    Ca    7.2<L>      06 Sep 2017 02:39  Phos  4.9     09-06  Mg     1.6     09-06    TPro  5.7<L>  /  Alb  2.8<L>  /  TBili  1.6<H>  /  DBili  x   /  AST  12  /  ALT  8<L>  /  AlkPhos  121<H>  09-06    PT/INR - ( 05 Sep 2017 08:38 )   PT: 15.1 sec;   INR: 1.33 ratio         PTT - ( 05 Sep 2017 08:38 )  PTT:34.9 sec          RADIOLOGY & ADDITIONAL TESTS:    Imaging Personally Reviewed:    Consultant(s) Notes Reviewed:      Care Discussed with Consultants/Other Providers: Patient is a 57y old  Female who presents with a chief complaint of Peritonitis, Hemoperitoneum (03 Sep 2017 20:09)      SUBJECTIVE / OVERNIGHT EVENTS:  Had lavage performed yesterday. Was transferred to [address] [place] [address]. Abdominal pain stable. Had bowel movement yesterday.     MEDICATIONS  (STANDING):  cefepime  IVPB   IV Intermittent   cefepime  IVPB 500 milliGRAM(s) IV Intermittent every 48 hours  amLODIPine   Tablet 10 milliGRAM(s) Oral daily  senna 2 Tablet(s) Oral at bedtime  docusate sodium 100 milliGRAM(s) Oral two times a day  sodium bicarbonate 650 milliGRAM(s) Oral four times a day    MEDICATIONS  (PRN):  acetaminophen   Tablet. 650 milliGRAM(s) Oral every 6 hours PRN headache  HYDROmorphone  Injectable 0.5 milliGRAM(s) IV Push every 4 hours PRN Severe Pain (7 - 10)  oxyCODONE    IR 2.5 milliGRAM(s) Oral daily PRN Moderate Pain (4 - 6)      Vital Signs Last 24 Hrs  T(C): 36.9 (07 Sep 2017 05:21), Max: 37.5 (07 Sep 2017 00:27)  T(F): 98.4 (07 Sep 2017 05:21), Max: 99.5 (07 Sep 2017 00:27)  HR: 92 (07 Sep 2017 05:21) (86 - 111)  BP: 122/77 (07 Sep 2017 05:21) (108/73 - 127/76)  BP(mean): --  RR: 18 (07 Sep 2017 05:21) (18 - 20)  SpO2: 92% (07 Sep 2017 05:21) (92% - 98%)        I&O's Summary    05 Sep 2017 07:01  -  06 Sep 2017 07:00  --------------------------------------------------------  IN: 1660 mL / OUT: 450 mL / NET: 1210 mL    06 Sep 2017 07:01  -  07 Sep 2017 06:59  --------------------------------------------------------  IN: 360 mL / OUT: 0 mL / NET: 360 mL        PHYSICAL EXAM:  GENERAL: NAD, well-developed  HEAD:  Atraumatic, Normocephalic  CHEST/LUNG: Clear to auscultation bilaterally; No wheezes, rhonchi or gallops  HEART: Regular rate and rhythm; No murmurs, rubs, or gallops  ABDOMEN: Soft, continues to have abdominal tenderness and guarding, BS+  EXTREMITIES:  No edema  PSYCH: Appropriate  NEUROLOGY: non-focal  SKIN: No rashes or lesions    LABS:                        8.4    15.7  )-----------( 440      ( 07 Sep 2017 06:38 )             26.3     09-06    134<L>  |  98  |  42<H>  ----------------------------<  115<H>  4.1   |  24  |  4.51<H>    Ca    7.2<L>      06 Sep 2017 02:39  Phos  4.9     09-06  Mg     1.6     09-06    TPro  5.7<L>  /  Alb  2.8<L>  /  TBili  1.6<H>  /  DBili  x   /  AST  12  /  ALT  8<L>  /  AlkPhos  121<H>  09-06    PT/INR - ( 05 Sep 2017 08:38 )   PT: 15.1 sec;   INR: 1.33 ratio         PTT - ( 05 Sep 2017 08:38 )  PTT:34.9 sec

## 2017-09-07 NOTE — PROGRESS NOTE ADULT - SUBJECTIVE AND OBJECTIVE BOX
[facility] DIVISION OF KIDNEY DISEASES AND HYPERTENSION -- FOLLOW UP NOTE  --------------------------------------------------------------------------------  Chief Complaint:  Hemoperitoneum in PD patient    24 hour events/subjective:  patient reports abdominal pain this morning, improved from admission but not since yesterday.  Yesterday patient stated that she felt better after initial lavage.  Denies any nausea, vomitting.  Reports that she feels warm and her temperature usually runs low, 96,7F.  Denies any dysuria.        PAST HISTORY  --------------------------------------------------------------------------------  No significant changes to PMH, PSH, FHx, SHx, unless otherwise noted    ALLERGIES & MEDICATIONS  --------------------------------------------------------------------------------  Allergies    No Known Allergies    Intolerances      Standing Inpatient Medications  cefepime  IVPB   IV Intermittent   cefepime  IVPB 500 milliGRAM(s) IV Intermittent every 48 hours  amLODIPine   Tablet 10 milliGRAM(s) Oral daily  senna 2 Tablet(s) Oral at bedtime  docusate sodium 100 milliGRAM(s) Oral two times a day  sodium bicarbonate 650 milliGRAM(s) Oral four times a day    PRN Inpatient Medications  acetaminophen   Tablet. 650 milliGRAM(s) Oral every 6 hours PRN  HYDROmorphone  Injectable 0.5 milliGRAM(s) IV Push every 4 hours PRN  oxyCODONE    IR 2.5 milliGRAM(s) Oral daily PRN      REVIEW OF SYSTEMS  --------------------------------------------------------------------------------  Gen: feels warm, no diaphoresis  Skin: No rashes  Head/Eyes/Ears/Mouth: +headache  Respiratory: No dyspnea  CV: No chest pain  GI: +nausea  : No dysuria  MSK: No edema  Neuro: No dizziness/lightheadedness    VITALS/PHYSICAL EXAM  --------------------------------------------------------------------------------  T(C): 36.9 (09-07-17 @ 05:21), Max: 37.5 (09-07-17 @ 00:27)  HR: 92 (09-07-17 @ 05:21) (86 - 111)  BP: 122/77 (09-07-17 @ 05:21) (108/73 - 127/76)  RR: 18 (09-07-17 @ 05:21) (18 - 20)  SpO2: 92% (09-07-17 @ 05:21) (92% - 98%)  Wt(kg): --        09-06-17 @ 07:01  -  09-07-17 @ 07:00  --------------------------------------------------------  IN: 360 mL / OUT: 0 mL / NET: 360 mL      Physical Exam:  	Gen: NAD, well-appearing  	HEENT: MMM  	Pulm: CTA B/L  	CV: RRR, S1S2; no rub  	Abd: +BS, +guarding, +tenderness to palpation  	: No suprapubic tenderness  	UE:  no edema  	LE:  no edema  	Neuro: No focal deficits  	Psych: Normal affect and mood  	Skin: Warm    LABS/STUDIES  --------------------------------------------------------------------------------              8.4    15.7  >-----------<  440      [09-07-17 @ 06:38]              26.3     134  |  98  |  42  ----------------------------<  115      [09-06-17 @ 02:39]  4.1   |  24  |  4.51        Ca     7.2     [09-06-17 @ 02:39]      Mg     1.6     [09-06-17 @ 02:39]      Phos  4.9     [09-06-17 @ 02:39]    TPro  5.7  /  Alb  2.8  /  TBili  1.6  /  DBili  x   /  AST  12  /  ALT  8   /  AlkPhos  121  [09-06-17 @ 02:39]    Creatinine Trend:  SCr 4.51 [09-06 @ 02:39]  SCr 4.27 [09-05 @ 08:46]  SCr 4.52 [09-04 @ 16:43]  SCr 4.71 [09-04 @ 08:12]  SCr 4.91 [09-04 @ 04:11]

## 2017-09-07 NOTE — PROGRESS NOTE ADULT - PROBLEM SELECTOR PLAN 1
Patient tolerated PD lavages overnight.  CBC is stable, cultures were sent and pending.  Dialysis nursing notes from yesterday noted, PD lavage fluid became increasingly more clear.  Will continue PD lavages today.

## 2017-09-07 NOTE — PROGRESS NOTE ADULT - ATTENDING COMMENTS
58 yo female with recent new start PD (Sachdeva), chronic GN, polycythemia vera was at home on Sat completed PD and drained without any difficulty  6 hours later pt reported sharp pain while at home felt weak and then she syncopized and [facility]  The catheter was manipulated at that time and was noted to have hemoperitoneum and CT confirmed that  Given abx and transfused 1 unit    ESRD: on peritoneal dialysis  Currently with stable electrolytes  No urgent indication for dialysis today    Hemoperitoneum: No obvious etiology identified on the CT scan. No active bleeding    Hct  stable  Peritoneal lavage again today ( 2 Liter ).   Continue with Antibiotics, F/U culture    Anemia:  Hg low but stable

## 2017-09-07 NOTE — PROGRESS NOTE ADULT - PROBLEM SELECTOR PLAN 4
Multiple pancreatic lesions  - Unclear if chronic or new, as previous scans were without contrast. One known lesion on prior imaging. Please see MRI in 12/2014: 1.4  cm  pancreatic  cystic  lesion  concerning  for  neoplasm  in  the  body  of  the pancreas.  - Will need repeat imaging and close follow-up as outpatient

## 2017-09-07 NOTE — PROGRESS NOTE ADULT - PROBLEM SELECTOR PLAN 3
Holding PD currently  - Continue peritoneal lavage per renal  - Continue to follow nephrology recommendations

## 2017-09-07 NOTE — PROGRESS NOTE ADULT - ASSESSMENT
56 yo F with ESRD on peritoneal dialysis, PUD, polycythemia vera with episode of abd pain, hemoperitoneum. Patient had high leukocytosis on presentation, had a low grade elevated temp to 100.3F. No overt fevers, no chills, does not appear toxic. OSH reportedly with intraperitoneal leukocytosis in setting of shauna bleeding. Culture from OSH not available. Does not appear to have evidence of bowel perf or other infectious source on CT scan. Overall have low suspicion for infectious peritonitis (suspect leukocytosis, low grade elevated temp, abd pain all 2/2 to hematoma), but difficult to determine as primary diagnostic test (intraperitoneal cell count) wound be confounded by bleeding. Patient continues to do well on present treatment; leukocytosis continues to improve, no signs active infection.  - Continue Cefepime 1g q 48--although dosing dependent on plan for PD  - F/U BCX, Follow up lavage culture--if negative, most likely would stop antibiotics  - Follow up outside hospital culture      Jose Luis Bui MD  Pager [phone]  After 5pm and on weekends call [phone]

## 2017-09-07 NOTE — PROGRESS NOTE ADULT - ASSESSMENT
57y year old Female who presents with hemoperitoneum, patient had ESRD with peritoneal dialysis catheter placed on August, presented to OSH with bleeding in abdomen, transferred to [facility] for further management, now with stable H/H. S/p PD cath lavage overnight and 1U PRBC    - Increased pain this AM, f/u stat CBC and lactate    M Chana, PGY 2   [id number] 57y year old Female who presents with hemoperitoneum, patient had ESRD with peritoneal dialysis catheter placed on August, presented to OSH with bleeding in abdomen, transferred to [facility] for further management, now with stable H/H. S/p PD cath lavage overnight.    - Increased pain this AM, f/u stat CBC and lactate    M Chana, PGY 2   [id number] 57y year old Female who presents with hemoperitoneum, patient had ESRD with peritoneal dialysis catheter placed on August, presented to OSH with bleeding in abdomen, transferred to [facility] for further management, now with stable H/H. S/p PD cath lavage overnight.    - Increased pain this AM, likely 2/2 secondary to lavage  - CBC stable, no clinical signs of bleeding  - Will continue to monitor  - D/w Dr. Héctor Zayas, PGY 2   [phone]

## 2017-09-07 NOTE — PROGRESS NOTE ADULT - PROBLEM SELECTOR PLAN 2
Abdominal pain and leukocytosis possibly 2/2 underlying bleed vs peritonitis   - Continue cefepime (started 9/3)  - Readdress cultures (unclear, no cultures in system, may be at OSH). Per documentation gram stain was negative.   - Peritoneal culture pending  - Continue to follow ID recommendations

## 2017-09-07 NOTE — PROGRESS NOTE ADULT - SUBJECTIVE AND OBJECTIVE BOX
CC: F/U for ? peritonitis    Saw/spoke to patient. No fevers, no chills. Feels well. Still abd pain. No new complaints. Overall improving.    Allergies  No Known Allergies    ANTIMICROBIALS:  cefepime  IVPB    cefepime  IVPB 500 every 48 hours    PE:    Vital Signs Last 24 Hrs  T(C): 36.9 (07 Sep 2017 05:21), Max: 37.5 (07 Sep 2017 00:27)  T(F): 98.4 (07 Sep 2017 05:21), Max: 99.5 (07 Sep 2017 00:27)  HR: 92 (07 Sep 2017 05:21) (86 - 111)  BP: 122/77 (07 Sep 2017 05:21) (108/73 - 127/76)  BP(mean): --  RR: 18 (07 Sep 2017 05:21) (18 - 20)  SpO2: 92% (07 Sep 2017 05:21) (92% - 98%)    Gen: AOx3, NAD, non-toxic, pleasant, sitting up in bed  CV: S1+S2 normal, no murmurs, nontachycardic  Resp: Clear bilat, no resp distress, no crackles/wheezes  Abd: Soft, mild tenderness, PD catheter in place (unchanged)  Ext: No LE edema, no wounds    LABS:                        8.4    15.7  )-----------( 440      ( 07 Sep 2017 06:38 )             26.3     09-06    134<L>  |  98  |  42<H>  ----------------------------<  115<H>  4.1   |  24  |  4.51<H>    Ca    7.2<L>      06 Sep 2017 02:39  Phos  4.9     09-06  Mg     1.6     09-06    TPro  5.7<L>  /  Alb  2.8<L>  /  TBili  1.6<H>  /  DBili  x   /  AST  12  /  ALT  8<L>  /  AlkPhos  121<H>  09-06    MICROBIOLOGY:    9/4 BCX x2 NGTD    RADIOLOGY:    9/5 CT A/P:    IMPRESSION:     1.  Hemoperitoneum with follow-up hyperdense hematoma. No evidence of   active contrast extravasation.  2.  Small bilateral pleural effusions with associated atelectasis or   consolidation.

## 2017-09-07 NOTE — PROGRESS NOTE ADULT - PROBLEM SELECTOR PLAN 1
Currently blood pressure is stable, with mild tachycardia likely from underlying anemia. Possible causes include traction on PD catheter insertion site, splenic injury or herald bleed from undiagnosed mesenteric artery aneurysm.   - CT abdomen/pelvis with hemoperitoneum and multiple hematomas  - Continue Q12 CBCs  - Continue peritoneal lavages per nephrology

## 2017-09-07 NOTE — PROGRESS NOTE ADULT - SUBJECTIVE AND OBJECTIVE BOX
[facility]    Pt underwent PD cath lavage overnight and received 1U PRBC. This am complaining of pain. No nausea or emesis. Still having bowel function    PE  Gen: NAD, AOx4  Abd: tender to palpation in bilateral lower quadrants, pd site c/d/i    Vital Signs Last 24 Hrs  T(C): 36.9 (09-07-17 @ 05:21), Max: 37.5 (09-07-17 @ 00:27)  T(F): 98.4 (09-07-17 @ 05:21), Max: 99.5 (09-07-17 @ 00:27)  HR: 92 (09-07-17 @ 05:21) (86 - 111)  BP: 122/77 (09-07-17 @ 05:21) (104/68 - 127/76)  BP(mean): --  RR: 18 (09-07-17 @ 05:21) (18 - 20)  SpO2: 92% (09-07-17 @ 05:21) (92% - 98%)  I&O's Detail    05 Sep 2017 07:01  -  06 Sep 2017 07:00  --------------------------------------------------------  IN:    Oral Fluid: 1610 mL    Solution: 50 mL  Total IN: 1660 mL    OUT:    Voided: 450 mL  Total OUT: 450 mL    Total NET: 1210 mL      06 Sep 2017 07:01  -  07 Sep 2017 06:45  --------------------------------------------------------  IN:    Oral Fluid: 360 mL  Total IN: 360 mL    OUT:  Total OUT: 0 mL    Total NET: 360 mL                              10.0   25.0  )-----------( 647      ( 06 Sep 2017 16:11 )             30.9     09-06    134<L>  |  98  |  42<H>  ----------------------------<  115<H>  4.1   |  24  |  4.51<H>    Ca    7.2<L>      06 Sep 2017 02:39  Phos  4.9     09-06  Mg     1.6     09-06    TPro  5.7<L>  /  Alb  2.8<L>  /  TBili  1.6<H>  /  DBili  x   /  AST  12  /  ALT  8<L>  /  AlkPhos  121<H>  09-06    PT/INR - ( 05 Sep 2017 08:38 )   PT: 15.1 sec;   INR: 1.33 ratio         PTT - ( 05 Sep 2017 08:38 )  PTT:34.9 sec  CAPILLARY BLOOD GLUCOSE          MEDICATIONS  (STANDING):  cefepime  IVPB   IV Intermittent   cefepime  IVPB 500 milliGRAM(s) IV Intermittent every 48 hours  amLODIPine   Tablet 10 milliGRAM(s) Oral daily  senna 2 Tablet(s) Oral at bedtime  docusate sodium 100 milliGRAM(s) Oral two times a day  sodium bicarbonate 650 milliGRAM(s) Oral four times a day    MEDICATIONS  (PRN):  acetaminophen   Tablet. 650 milliGRAM(s) Oral every 6 hours PRN headache  HYDROmorphone  Injectable 0.5 milliGRAM(s) IV Push every 4 hours PRN Severe Pain (7 - 10)  oxyCODONE    IR 2.5 milliGRAM(s) Oral daily PRN Moderate Pain (4 - 6) [facility]    Pt underwent PD cath lavage overnight. This am complaining of pain. No nausea or emesis. Still having bowel function    PE  Gen: NAD, AOx4  Abd: tender to palpation in bilateral lower quadrants, pd site c/d/i    Vital Signs Last 24 Hrs  T(C): 36.9 (09-07-17 @ 05:21), Max: 37.5 (09-07-17 @ 00:27)  T(F): 98.4 (09-07-17 @ 05:21), Max: 99.5 (09-07-17 @ 00:27)  HR: 92 (09-07-17 @ 05:21) (86 - 111)  BP: 122/77 (09-07-17 @ 05:21) (104/68 - 127/76)  BP(mean): --  RR: 18 (09-07-17 @ 05:21) (18 - 20)  SpO2: 92% (09-07-17 @ 05:21) (92% - 98%)  I&O's Detail    05 Sep 2017 07:01  -  06 Sep 2017 07:00  --------------------------------------------------------  IN:    Oral Fluid: 1610 mL    Solution: 50 mL  Total IN: 1660 mL    OUT:    Voided: 450 mL  Total OUT: 450 mL    Total NET: 1210 mL      06 Sep 2017 07:01  -  07 Sep 2017 06:45  --------------------------------------------------------  IN:    Oral Fluid: 360 mL  Total IN: 360 mL    OUT:  Total OUT: 0 mL    Total NET: 360 mL                              10.0   25.0  )-----------( 647      ( 06 Sep 2017 16:11 )             30.9     09-06    134<L>  |  98  |  42<H>  ----------------------------<  115<H>  4.1   |  24  |  4.51<H>    Ca    7.2<L>      06 Sep 2017 02:39  Phos  4.9     09-06  Mg     1.6     09-06    TPro  5.7<L>  /  Alb  2.8<L>  /  TBili  1.6<H>  /  DBili  x   /  AST  12  /  ALT  8<L>  /  AlkPhos  121<H>  09-06    PT/INR - ( 05 Sep 2017 08:38 )   PT: 15.1 sec;   INR: 1.33 ratio         PTT - ( 05 Sep 2017 08:38 )  PTT:34.9 sec  CAPILLARY BLOOD GLUCOSE          MEDICATIONS  (STANDING):  cefepime  IVPB   IV Intermittent   cefepime  IVPB 500 milliGRAM(s) IV Intermittent every 48 hours  amLODIPine   Tablet 10 milliGRAM(s) Oral daily  senna 2 Tablet(s) Oral at bedtime  docusate sodium 100 milliGRAM(s) Oral two times a day  sodium bicarbonate 650 milliGRAM(s) Oral four times a day    MEDICATIONS  (PRN):  acetaminophen   Tablet. 650 milliGRAM(s) Oral every 6 hours PRN headache  HYDROmorphone  Injectable 0.5 milliGRAM(s) IV Push every 4 hours PRN Severe Pain (7 - 10)  oxyCODONE    IR 2.5 milliGRAM(s) Oral daily PRN Moderate Pain (4 - 6)

## 2017-09-08 DIAGNOSIS — R10.9 UNSPECIFIED ABDOMINAL PAIN: ICD-10-CM

## 2017-09-08 LAB
ALBUMIN SERPL ELPH-MCNC: 2.5 G/DL — LOW (ref 3.3–5)
ALP SERPL-CCNC: 131 U/L — HIGH (ref 40–120)
ALT FLD-CCNC: 4 U/L — LOW (ref 10–45)
ANION GAP SERPL CALC-SCNC: 14 MMOL/L — SIGNIFICANT CHANGE UP (ref 5–17)
AST SERPL-CCNC: 19 U/L — SIGNIFICANT CHANGE UP (ref 10–40)
BASOPHILS # BLD AUTO: 0.05 K/UL — SIGNIFICANT CHANGE UP (ref 0–0.2)
BASOPHILS NFR BLD AUTO: 0.4 % — SIGNIFICANT CHANGE UP (ref 0–2)
BILIRUB SERPL-MCNC: 1.2 MG/DL — SIGNIFICANT CHANGE UP (ref 0.2–1.2)
BUN SERPL-MCNC: 32 MG/DL — HIGH (ref 7–23)
CALCIUM SERPL-MCNC: 7.5 MG/DL — LOW (ref 8.4–10.5)
CHLORIDE SERPL-SCNC: 102 MMOL/L — SIGNIFICANT CHANGE UP (ref 96–108)
CO2 SERPL-SCNC: 20 MMOL/L — LOW (ref 22–31)
CREAT SERPL-MCNC: 4.44 MG/DL — HIGH (ref 0.5–1.3)
EOSINOPHIL # BLD AUTO: 0.37 K/UL — SIGNIFICANT CHANGE UP (ref 0–0.5)
EOSINOPHIL NFR BLD AUTO: 2.7 % — SIGNIFICANT CHANGE UP (ref 0–6)
GLUCOSE SERPL-MCNC: 89 MG/DL — SIGNIFICANT CHANGE UP (ref 70–99)
HCT VFR BLD CALC: 25.9 % — LOW (ref 34.5–45)
HCT VFR BLD CALC: 29.6 % — LOW (ref 34.5–45)
HGB BLD-MCNC: 7.9 G/DL — LOW (ref 11.5–15.5)
HGB BLD-MCNC: 9.4 G/DL — LOW (ref 11.5–15.5)
IMM GRANULOCYTES NFR BLD AUTO: 0.3 % — SIGNIFICANT CHANGE UP (ref 0–1.5)
LYMPHOCYTES # BLD AUTO: 1.2 K/UL — SIGNIFICANT CHANGE UP (ref 1–3.3)
LYMPHOCYTES # BLD AUTO: 8.8 % — LOW (ref 13–44)
MAGNESIUM SERPL-MCNC: 1.7 MG/DL — SIGNIFICANT CHANGE UP (ref 1.6–2.6)
MCHC RBC-ENTMCNC: 23 PG — LOW (ref 27–34)
MCHC RBC-ENTMCNC: 24.4 PG — LOW (ref 27–34)
MCHC RBC-ENTMCNC: 30.5 GM/DL — LOW (ref 32–36)
MCHC RBC-ENTMCNC: 31.6 GM/DL — LOW (ref 32–36)
MCV RBC AUTO: 75.3 FL — LOW (ref 80–100)
MCV RBC AUTO: 77.1 FL — LOW (ref 80–100)
MONOCYTES # BLD AUTO: 0.7 K/UL — SIGNIFICANT CHANGE UP (ref 0–0.9)
MONOCYTES NFR BLD AUTO: 5.1 % — SIGNIFICANT CHANGE UP (ref 2–14)
NEUTROPHILS # BLD AUTO: 11.3 K/UL — HIGH (ref 1.8–7.4)
NEUTROPHILS NFR BLD AUTO: 82.7 % — HIGH (ref 43–77)
PHOSPHATE SERPL-MCNC: 4.1 MG/DL — SIGNIFICANT CHANGE UP (ref 2.5–4.5)
PLATELET # BLD AUTO: 406 K/UL — HIGH (ref 150–400)
PLATELET # BLD AUTO: 521 K/UL — HIGH (ref 150–400)
POTASSIUM SERPL-MCNC: 5.1 MMOL/L — SIGNIFICANT CHANGE UP (ref 3.5–5.3)
POTASSIUM SERPL-SCNC: 5.1 MMOL/L — SIGNIFICANT CHANGE UP (ref 3.5–5.3)
PROT SERPL-MCNC: 5.9 G/DL — LOW (ref 6–8.3)
RBC # BLD: 3.44 M/UL — LOW (ref 3.8–5.2)
RBC # BLD: 3.84 M/UL — SIGNIFICANT CHANGE UP (ref 3.8–5.2)
RBC # FLD: 21.5 % — HIGH (ref 10.3–14.5)
RBC # FLD: 21.7 % — HIGH (ref 10.3–14.5)
SODIUM SERPL-SCNC: 136 MMOL/L — SIGNIFICANT CHANGE UP (ref 135–145)
WBC # BLD: 13.66 K/UL — HIGH (ref 3.8–10.5)
WBC # BLD: 20.4 K/UL — HIGH (ref 3.8–10.5)
WBC # FLD AUTO: 13.66 K/UL — HIGH (ref 3.8–10.5)
WBC # FLD AUTO: 20.4 K/UL — HIGH (ref 3.8–10.5)

## 2017-09-08 PROCEDURE — 74176 CT ABD & PELVIS W/O CONTRAST: CPT | Mod: 26

## 2017-09-08 PROCEDURE — 99232 SBSQ HOSP IP/OBS MODERATE 35: CPT

## 2017-09-08 PROCEDURE — 99233 SBSQ HOSP IP/OBS HIGH 50: CPT | Mod: GC

## 2017-09-08 RX ORDER — ERYTHROPOIETIN 10000 [IU]/ML
10000 INJECTION, SOLUTION INTRAVENOUS; SUBCUTANEOUS ONCE
Qty: 0 | Refills: 0 | Status: COMPLETED | OUTPATIENT
Start: 2017-09-08 | End: 2017-09-08

## 2017-09-08 RX ORDER — POLYETHYLENE GLYCOL 3350 17 G/17G
17 POWDER, FOR SOLUTION ORAL DAILY
Qty: 0 | Refills: 0 | Status: DISCONTINUED | OUTPATIENT
Start: 2017-09-08 | End: 2017-09-18

## 2017-09-08 RX ORDER — LACTULOSE 10 G/15ML
5 SOLUTION ORAL
Qty: 0 | Refills: 0 | Status: DISCONTINUED | OUTPATIENT
Start: 2017-09-08 | End: 2017-09-08

## 2017-09-08 RX ADMIN — Medication 100 MILLIGRAM(S): at 17:52

## 2017-09-08 RX ADMIN — POLYETHYLENE GLYCOL 3350 17 GRAM(S): 17 POWDER, FOR SOLUTION ORAL at 13:28

## 2017-09-08 RX ADMIN — HYDROMORPHONE HYDROCHLORIDE 0.5 MILLIGRAM(S): 2 INJECTION INTRAMUSCULAR; INTRAVENOUS; SUBCUTANEOUS at 02:53

## 2017-09-08 RX ADMIN — Medication 100 MILLIGRAM(S): at 05:43

## 2017-09-08 RX ADMIN — HYDROMORPHONE HYDROCHLORIDE 0.5 MILLIGRAM(S): 2 INJECTION INTRAMUSCULAR; INTRAVENOUS; SUBCUTANEOUS at 02:23

## 2017-09-08 RX ADMIN — Medication 650 MILLIGRAM(S): at 17:52

## 2017-09-08 RX ADMIN — AMLODIPINE BESYLATE 10 MILLIGRAM(S): 2.5 TABLET ORAL at 05:43

## 2017-09-08 RX ADMIN — ERYTHROPOIETIN 10000 UNIT(S): 10000 INJECTION, SOLUTION INTRAVENOUS; SUBCUTANEOUS at 13:28

## 2017-09-08 RX ADMIN — Medication 650 MILLIGRAM(S): at 05:43

## 2017-09-08 RX ADMIN — Medication 650 MILLIGRAM(S): at 13:13

## 2017-09-08 RX ADMIN — LACTULOSE 5 GRAM(S): 10 SOLUTION ORAL at 08:36

## 2017-09-08 NOTE — PROGRESS NOTE ADULT - PROBLEM SELECTOR PLAN 2
Abdominal pain and leukocytosis possibly 2/2 underlying bleed vs peritonitis   - D/c cefepime (started 9/3), lavage cultures returned negative today  - Per documentation gram stain was negative at OSH.   - Continue to follow ID recommendations Currently blood pressure is stable, with mild tachycardia likely from underlying anemia. Possible causes include traction on PD catheter insertion site, splenic injury or herald bleed from undiagnosed mesenteric artery aneurysm.   - CT abdomen/pelvis with hemoperitoneum and multiple hematomas  - Continue Q12 CBCs  - Continue peritoneal lavages per nephrology

## 2017-09-08 NOTE — PROGRESS NOTE ADULT - PROBLEM SELECTOR PLAN 3
Hemoglobin is stable, monitor h/h.  Will give intermittent epogen subQ injections, 10,000 units today.

## 2017-09-08 NOTE — PROGRESS NOTE ADULT - PROBLEM SELECTOR PLAN 4
Radiology updated report, reported varices rather than lesions.   - Clarify with radiology today  - Unclear if chronic or new, as previous scans were without contrast. One known lesion on prior imaging. Please see MRI in 12/2014: 1.4  cm  pancreatic  cystic  lesion  concerning  for  neoplasm  in  the  body  of  the pancreas.  - Will need repeat imaging and close follow-up as outpatient

## 2017-09-08 NOTE — PROGRESS NOTE ADULT - PROBLEM SELECTOR PLAN 1
Currently blood pressure is stable, with mild tachycardia likely from underlying anemia. Possible causes include traction on PD catheter insertion site, splenic injury or herald bleed from undiagnosed mesenteric artery aneurysm.   - CT abdomen/pelvis with hemoperitoneum and multiple hematomas  - Continue Q12 CBCs  - Continue peritoneal lavages per nephrology Likely 2/2 hematomas and underlying abdominal irritation. Worsened on exam today.   - Follow-up afternoon CBC  - If worsening abdominal exam repeat CT abdomen without contrast  - Unlikely 2/2 peritonitis given negative cultures. Cefepime d/c today  - Continue to follow-up ID recommendations

## 2017-09-08 NOTE — PROGRESS NOTE ADULT - SUBJECTIVE AND OBJECTIVE BOX
Patient is a 57y old  Female who presents with a chief complaint of Peritonitis, Hemoperitoneum (03 Sep 2017 20:09)      SUBJECTIVE / OVERNIGHT EVENTS:    MEDICATIONS  (STANDING):  cefepime  IVPB   IV Intermittent   cefepime  IVPB 500 milliGRAM(s) IV Intermittent every 48 hours  amLODIPine   Tablet 10 milliGRAM(s) Oral daily  senna 2 Tablet(s) Oral at bedtime  docusate sodium 100 milliGRAM(s) Oral two times a day  sodium bicarbonate 650 milliGRAM(s) Oral four times a day    MEDICATIONS  (PRN):  acetaminophen   Tablet. 650 milliGRAM(s) Oral every 6 hours PRN headache  HYDROmorphone  Injectable 0.5 milliGRAM(s) IV Push every 4 hours PRN Severe Pain (7 - 10)  oxyCODONE    IR 2.5 milliGRAM(s) Oral daily PRN Moderate Pain (4 - 6)      CAPILLARY BLOOD GLUCOSE    Vital Signs Last 24 Hrs  T(C): 36.9 (08 Sep 2017 05:36), Max: 37.3 (07 Sep 2017 22:00)  T(F): 98.4 (08 Sep 2017 05:36), Max: 99.1 (07 Sep 2017 22:00)  HR: 92 (08 Sep 2017 05:36) (92 - 107)  BP: 113/73 (08 Sep 2017 05:36) (113/73 - 128/80)  BP(mean): --  RR: 18 (08 Sep 2017 05:36) (18 - 18)  SpO2: 95% (08 Sep 2017 05:36) (95% - 98%)    I&O's Summary    07 Sep 2017 07:01  -  08 Sep 2017 07:00  --------------------------------------------------------  IN: 460 mL / OUT: 0 mL / NET: 460 mL        PHYSICAL EXAM:  GENERAL: NAD, well-developed  HEAD:  Atraumatic, Normocephalic  EYES:  NECK:   CHEST/LUNG: Clear to auscultation bilaterally; No wheezes, rhonchi or gallops  HEART: Regular rate and rhythm; No murmurs, rubs, or gallops  ABDOMEN: Soft, Nontender, Nondistended; Bowel sounds present  EXTREMITIES:  2+ Peripheral Pulses, No clubbing, cyanosis, or edema  PSYCH: Appropriate  NEUROLOGY: non-focal  SKIN: No rashes or lesions    LABS:                        8.4    15.7  )-----------( 440      ( 07 Sep 2017 06:38 )             26.3     09-07    135  |  100  |  36<H>  ----------------------------<  69<L>  4.9   |  19<L>  |  4.33<H>    Ca    7.2<L>      07 Sep 2017 08:57  Phos  4.5     09-07  Mg     1.9     09-07    TPro  5.9<L>  /  Alb  2.7<L>  /  TBili  1.3<H>  /  DBili  x   /  AST  28  /  ALT  8<L>  /  AlkPhos  153<H>  09-07              RADIOLOGY & ADDITIONAL TESTS:    Imaging Personally Reviewed:    Consultant(s) Notes Reviewed:      Care Discussed with Consultants/Other Providers: Patient is a 57y old  Female who presents with a chief complaint of Peritonitis, Hemoperitoneum (03 Sep 2017 20:09)      SUBJECTIVE / OVERNIGHT EVENTS:  Worsening abdominal pain overnight. 6-7/10, currently 3/10 with medication. Had bowel movement. Was able to ambulate yesterday. Reduced flatus. Mild temperature overnight.     MEDICATIONS  (STANDING):  cefepime  IVPB   IV Intermittent   cefepime  IVPB 500 milliGRAM(s) IV Intermittent every 48 hours  amLODIPine   Tablet 10 milliGRAM(s) Oral daily  senna 2 Tablet(s) Oral at bedtime  docusate sodium 100 milliGRAM(s) Oral two times a day  sodium bicarbonate 650 milliGRAM(s) Oral four times a day    MEDICATIONS  (PRN):  acetaminophen   Tablet. 650 milliGRAM(s) Oral every 6 hours PRN headache  HYDROmorphone  Injectable 0.5 milliGRAM(s) IV Push every 4 hours PRN Severe Pain (7 - 10)  oxyCODONE    IR 2.5 milliGRAM(s) Oral daily PRN Moderate Pain (4 - 6)      CAPILLARY BLOOD GLUCOSE    Vital Signs Last 24 Hrs  T(C): 36.9 (08 Sep 2017 05:36), Max: 37.3 (07 Sep 2017 22:00)  T(F): 98.4 (08 Sep 2017 05:36), Max: 99.1 (07 Sep 2017 22:00)  HR: 92 (08 Sep 2017 05:36) (92 - 107)  BP: 113/73 (08 Sep 2017 05:36) (113/73 - 128/80)  BP(mean): --  RR: 18 (08 Sep 2017 05:36) (18 - 18)  SpO2: 95% (08 Sep 2017 05:36) (95% - 98%)    I&O's Summary    07 Sep 2017 07:01  -  08 Sep 2017 07:00  --------------------------------------------------------  IN: 460 mL / OUT: 0 mL / NET: 460 mL        PHYSICAL EXAM:  GENERAL: NAD, well-developed  HEAD:  Atraumatic, Normocephalic  CHEST/LUNG: Clear to auscultation bilaterally; No wheezes, rhonchi or gallops  HEART: Regular rate and rhythm; No murmurs, rubs, or gallops  ABDOMEN: Tender with abdominal guarding,   EXTREMITIES:  No edema  PSYCH: Appropriate  SKIN: No rashes or lesions    LABS:                        8.4    15.7  )-----------( 440      ( 07 Sep 2017 06:38 )             26.3     09-07    135  |  100  |  36<H>  ----------------------------<  69<L>  4.9   |  19<L>  |  4.33<H>    Ca    7.2<L>      07 Sep 2017 08:57  Phos  4.5     09-07  Mg     1.9     09-07    TPro  5.9<L>  /  Alb  2.7<L>  /  TBili  1.3<H>  /  DBili  x   /  AST  28  /  ALT  8<L>  /  AlkPhos  153<H>  09-07

## 2017-09-08 NOTE — PROGRESS NOTE ADULT - ATTENDING COMMENTS
d/w nephrology, pt will not be a PD candidate in the future, will need to arrange for PD catheter removal as well as AVF placement

## 2017-09-08 NOTE — PROGRESS NOTE ADULT - SUBJECTIVE AND OBJECTIVE BOX
[facility] DIVISION OF KIDNEY DISEASES AND HYPERTENSION -- FOLLOW UP NOTE  --------------------------------------------------------------------------------  Chief Complaint:  ESRD on PD with hemoperitoneum    24 hour events/subjective:  patient reports continued severe abdominal pain that feels like gas pain.  denies any fevers, chills, nausea, vomiting.        PAST HISTORY  --------------------------------------------------------------------------------  No significant changes to PMH, PSH, FHx, SHx, unless otherwise noted    ALLERGIES & MEDICATIONS  --------------------------------------------------------------------------------  Allergies    No Known Allergies    Intolerances      Standing Inpatient Medications  cefepime  IVPB   IV Intermittent   cefepime  IVPB 500 milliGRAM(s) IV Intermittent every 48 hours  amLODIPine   Tablet 10 milliGRAM(s) Oral daily  senna 2 Tablet(s) Oral at bedtime  docusate sodium 100 milliGRAM(s) Oral two times a day  sodium bicarbonate 650 milliGRAM(s) Oral four times a day  lactulose Syrup 5 Gram(s) Oral two times a day    PRN Inpatient Medications  acetaminophen   Tablet. 650 milliGRAM(s) Oral every 6 hours PRN  HYDROmorphone  Injectable 0.5 milliGRAM(s) IV Push every 4 hours PRN  oxyCODONE    IR 2.5 milliGRAM(s) Oral daily PRN      REVIEW OF SYSTEMS  --------------------------------------------------------------------------------  Gen: No fevers/chills  Skin: No rashes  Head/Eyes/Ears/Mouth: No headache  Respiratory: No dyspnea  CV: No chest pain  GI: No abdominal pain  : No dysuria  MSK: No edema  Neuro: No dizziness/lightheadedness    VITALS/PHYSICAL EXAM  --------------------------------------------------------------------------------  T(C): 36.9 (09-08-17 @ 05:36), Max: 37.3 (09-07-17 @ 22:00)  HR: 92 (09-08-17 @ 05:36) (92 - 107)  BP: 113/73 (09-08-17 @ 05:36) (113/73 - 128/80)  RR: 18 (09-08-17 @ 05:36) (18 - 18)  SpO2: 95% (09-08-17 @ 05:36) (95% - 98%)  Wt(kg): --        09-07-17 @ 07:01  -  09-08-17 @ 07:00  --------------------------------------------------------  IN: 460 mL / OUT: 0 mL / NET: 460 mL      Physical Exam:  	Gen: NAD, well-appearing  	HEENT: MMM  	Pulm: CTA B/L  	CV: RRR, S1S2; no rub  	Abd: +BS, soft, nontender/nondistended  	: No suprapubic tenderness  	UE:  no edema  	LE:  no edema  	Neuro: No focal deficits  	Psych: Normal affect and mood  	Skin: Warm  	Vascular access:    LABS/STUDIES  --------------------------------------------------------------------------------              8.4    15.7  >-----------<  440      [09-07-17 @ 06:38]              26.3     135  |  100  |  36  ----------------------------<  69      [09-07-17 @ 08:57]  4.9   |  19  |  4.33        Ca     7.2     [09-07-17 @ 08:57]      Mg     1.9     [09-07-17 @ 08:57]      Phos  4.5     [09-07-17 @ 08:57]    TPro  5.9  /  Alb  2.7  /  TBili  1.3  /  DBili  x   /  AST  28  /  ALT  8   /  AlkPhos  153  [09-07-17 @ 08:57]    Creatinine Trend:  SCr 4.33 [09-07 @ 08:57]  SCr 4.51 [09-06 @ 02:39]  SCr 4.27 [09-05 @ 08:46]  SCr 4.52 [09-04 @ 16:43]  SCr 4.71 [09-04 @ 08:12]

## 2017-09-08 NOTE — PROGRESS NOTE ADULT - ASSESSMENT
57y year old Female who presents with hemoperitoneum, patient had ESRD with peritoneal dialysis catheter placed on August, presented to OSH with bleeding in abdomen, transferred to [facility] for further management, now with stable H/H. S/p PD cath lavage overnight.    - CBC stable, continue to monitor  - No surgical intervention at this time    Leatha Zayas PGY 2  [phone]

## 2017-09-08 NOTE — PROGRESS NOTE ADULT - SUBJECTIVE AND OBJECTIVE BOX
CC: F/U for ? peritonitis    Saw/spoke to patient. No fevers, no chills, mild abd pain, no new complaints. Overall appears well.    Allergies  No Known Allergies    ANTIMICROBIALS:  cefepime  IVPB    cefepime  IVPB 500 every 48 hours    PE:    Vital Signs Last 24 Hrs  T(C): 36.9 (08 Sep 2017 05:36), Max: 37.3 (07 Sep 2017 22:00)  T(F): 98.4 (08 Sep 2017 05:36), Max: 99.1 (07 Sep 2017 22:00)  HR: 92 (08 Sep 2017 05:36) (92 - 107)  BP: 113/73 (08 Sep 2017 05:36) (113/73 - 128/80)  BP(mean): --  RR: 18 (08 Sep 2017 05:36) (18 - 18)  SpO2: 95% (08 Sep 2017 05:36) (95% - 98%)    Gen: AOx3, NAD, non-toxic, pleasant  CV: S1+S2 normal, no murmurs, nontachycardic  Resp: Clear bilat, no resp distress, no crackles/wheezes  Abd: Soft, mild abd tenderness, PD catheter in place  Ext: No LE edema, no wounds    LABS:                        7.9    13.66 )-----------( 406      ( 08 Sep 2017 08:54 )             25.9     09-08    136  |  102  |  32<H>  ----------------------------<  89  5.1   |  20<L>  |  4.44<H>    Ca    7.5<L>      08 Sep 2017 09:04  Phos  4.1     09-08  Mg     1.7     09-08    TPro  5.9<L>  /  Alb  2.5<L>  /  TBili  1.2  /  DBili  x   /  AST  19  /  ALT  4<L>  /  AlkPhos  131<H>  09-08    MICROBIOLOGY:    9/6 PD Fluid CX NGTD  9/4 BCX x2 NGTD    RADIOLOGY:    CT A/P 9/5:    IMPRESSION:     1.  Hemoperitoneum with follow-up hyperdense hematoma. No evidence of   active contrast extravasation.  2.  Small bilateral pleural effusions with associated atelectasis or   consolidation.

## 2017-09-08 NOTE — PROGRESS NOTE ADULT - PROBLEM SELECTOR PLAN 3
Holding PD currently. May need preparation for HD. No urgent need for dialysis currently.   - Continue peritoneal lavage per renal  - Continue to follow nephrology recommendations

## 2017-09-08 NOTE — PROGRESS NOTE ADULT - ASSESSMENT
58 yo F with ESRD on peritoneal dialysis, PUD, polycythemia vera with episode of abd pain, hemoperitoneum. Patient had high leukocytosis on presentation, had a low grade elevated temp to 100.3F. No overt fevers, no chills, does not appear toxic. OSH reportedly with intraperitoneal leukocytosis in setting of shauna bleeding. Culture from OSH not available. Does not appear to have evidence of bowel perf or other infectious source on CT scan. Overall have low suspicion for infectious peritonitis (suspect leukocytosis, low grade elevated temp, abd pain all 2/2 to hematoma), but difficult to determine as primary diagnostic test (intraperitoneal cell count) wound be confounded by bleeding. Patient continues to do well on present treatment; leukocytosis continues to improve, no signs active infection. Peritoneal culture negative.  - Discontinue Cefepime unless objection from Renal  - F/U peritoneal culture  - Follow up outside hospital culture  - Over weekend please call with questions or change in status [phone]      Jose Luis Bui MD  Pager [phone]  After 5pm and on weekends call [phone]

## 2017-09-08 NOTE — PROGRESS NOTE ADULT - ATTENDING COMMENTS
58 yo female with recent new start PD ([employer]), chronic GN, polycythemia vera was at home on Sat completed PD and drained without any difficulty  6 hours later pt reported sharp pain while at home felt weak and then she syncopized and was brought to [facility]  The catheter was manipulated at that time and was noted to have hemoperitoneum and CT confirmed that  Given abx and transfused 1 unit    ESRD:   Currently with stable electrolytes  No urgent indication for dialysis     Hemoperitoneum: reviewed in detail with the radiologist. 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 Its likely that one of the smaller vessels ruptured causing the hemoperitoneum. Given the above finding, she is no longer a  peritoneal dialysis candidate. Would schedule for removal of peritoneal dialysis  catheter and schedule for placement of AVF next week  Given the dropping Hct, agree with Repeat CT scan ( No Contrast)  stable.   Continue with Antibiotics     Anemia:  As above  Start INDER

## 2017-09-08 NOTE — PROGRESS NOTE ADULT - PROBLEM SELECTOR PLAN 2
Patient is not uremic, f/u electrolytes from today.  Hold PD for now.  Patient will likely be transitioned to HD.  Patient informed and is sad.

## 2017-09-08 NOTE — PROGRESS NOTE ADULT - PROBLEM SELECTOR PLAN 1
No urgent dialysis indications at this time, will follow-up labs today.  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  In my opinion, patient likely ruptured a smaller collateral vessel which may have been affected by peritoneal dialysis.  If this is the case, then patient would no longer be a candidate for peritoneal dialysis.  Potentially, splenectomy could decrease venous load on collateral vessels, can consider discussing with surgery.

## 2017-09-08 NOTE — PROGRESS NOTE ADULT - SUBJECTIVE AND OBJECTIVE BOX
Green Surgery Progress Note    Pt seen and examined. Pain greatly improved. Feeling that distention is resolving with bowel movements. Tolerating diet. Ambulating and voiding. No other issues at this time    PE  Gen: NAD, AOx4  Abd: soft, tender in LUQ, nondistended, pd cath intact    Vital Signs Last 24 Hrs  T(C): 36.9 (09-08-17 @ 05:36), Max: 37.3 (09-07-17 @ 22:00)  T(F): 98.4 (09-08-17 @ 05:36), Max: 99.1 (09-07-17 @ 22:00)  HR: 92 (09-08-17 @ 05:36) (92 - 107)  BP: 113/73 (09-08-17 @ 05:36) (113/73 - 128/80)  BP(mean): --  RR: 18 (09-08-17 @ 05:36) (18 - 18)  SpO2: 95% (09-08-17 @ 05:36) (95% - 98%)  I&O's Detail    07 Sep 2017 07:01  -  08 Sep 2017 07:00  --------------------------------------------------------  IN:    Oral Fluid: 360 mL    Solution: 100 mL  Total IN: 460 mL    OUT:  Total OUT: 0 mL    Total NET: 460 mL                              7.9    13.66 )-----------( 406      ( 08 Sep 2017 08:54 )             25.9     09-08    136  |  102  |  32<H>  ----------------------------<  89  5.1   |  20<L>  |  4.44<H>    Ca    7.5<L>      08 Sep 2017 09:04  Phos  4.1     09-08  Mg     1.7     09-08    TPro  5.9<L>  /  Alb  2.5<L>  /  TBili  1.2  /  DBili  x   /  AST  19  /  ALT  4<L>  /  AlkPhos  131<H>  09-08      CAPILLARY BLOOD GLUCOSE          MEDICATIONS  (STANDING):  amLODIPine   Tablet 10 milliGRAM(s) Oral daily  senna 2 Tablet(s) Oral at bedtime  docusate sodium 100 milliGRAM(s) Oral two times a day  sodium bicarbonate 650 milliGRAM(s) Oral four times a day  polyethylene glycol 3350 17 Gram(s) Oral daily    MEDICATIONS  (PRN):  acetaminophen   Tablet. 650 milliGRAM(s) Oral every 6 hours PRN headache  HYDROmorphone  Injectable 0.5 milliGRAM(s) IV Push every 4 hours PRN Severe Pain (7 - 10)  oxyCODONE    IR 2.5 milliGRAM(s) Oral daily PRN Moderate Pain (4 - 6)

## 2017-09-09 LAB
ALBUMIN SERPL ELPH-MCNC: 2.8 G/DL — LOW (ref 3.3–5)
ALP SERPL-CCNC: 135 U/L — HIGH (ref 40–120)
ALT FLD-CCNC: 5 U/L — LOW (ref 10–45)
ANION GAP SERPL CALC-SCNC: 15 MMOL/L — SIGNIFICANT CHANGE UP (ref 5–17)
AST SERPL-CCNC: 16 U/L — SIGNIFICANT CHANGE UP (ref 10–40)
BASOPHILS # BLD AUTO: 0.05 K/UL — SIGNIFICANT CHANGE UP (ref 0–0.2)
BASOPHILS NFR BLD AUTO: 0.3 % — SIGNIFICANT CHANGE UP (ref 0–2)
BILIRUB SERPL-MCNC: 1.2 MG/DL — SIGNIFICANT CHANGE UP (ref 0.2–1.2)
BLD GP AB SCN SERPL QL: NEGATIVE — SIGNIFICANT CHANGE UP
BUN SERPL-MCNC: 35 MG/DL — HIGH (ref 7–23)
CALCIUM SERPL-MCNC: 8.1 MG/DL — LOW (ref 8.4–10.5)
CHLORIDE SERPL-SCNC: 101 MMOL/L — SIGNIFICANT CHANGE UP (ref 96–108)
CO2 SERPL-SCNC: 19 MMOL/L — LOW (ref 22–31)
CREAT SERPL-MCNC: 4.34 MG/DL — HIGH (ref 0.5–1.3)
CULTURE RESULTS: SIGNIFICANT CHANGE UP
CULTURE RESULTS: SIGNIFICANT CHANGE UP
EOSINOPHIL # BLD AUTO: 0.38 K/UL — SIGNIFICANT CHANGE UP (ref 0–0.5)
EOSINOPHIL NFR BLD AUTO: 2.5 % — SIGNIFICANT CHANGE UP (ref 0–6)
GLUCOSE SERPL-MCNC: 94 MG/DL — SIGNIFICANT CHANGE UP (ref 70–99)
HCT VFR BLD CALC: 26.8 % — LOW (ref 34.5–45)
HGB BLD-MCNC: 8 G/DL — LOW (ref 11.5–15.5)
IMM GRANULOCYTES NFR BLD AUTO: 0.2 % — SIGNIFICANT CHANGE UP (ref 0–1.5)
LYMPHOCYTES # BLD AUTO: 1.11 K/UL — SIGNIFICANT CHANGE UP (ref 1–3.3)
LYMPHOCYTES # BLD AUTO: 7.4 % — LOW (ref 13–44)
MAGNESIUM SERPL-MCNC: 1.9 MG/DL — SIGNIFICANT CHANGE UP (ref 1.6–2.6)
MCHC RBC-ENTMCNC: 22.3 PG — LOW (ref 27–34)
MCHC RBC-ENTMCNC: 29.9 GM/DL — LOW (ref 32–36)
MCV RBC AUTO: 74.9 FL — LOW (ref 80–100)
MONOCYTES # BLD AUTO: 0.67 K/UL — SIGNIFICANT CHANGE UP (ref 0–0.9)
MONOCYTES NFR BLD AUTO: 4.5 % — SIGNIFICANT CHANGE UP (ref 2–14)
NEUTROPHILS # BLD AUTO: 12.81 K/UL — HIGH (ref 1.8–7.4)
NEUTROPHILS NFR BLD AUTO: 85.1 % — HIGH (ref 43–77)
PHOSPHATE SERPL-MCNC: 4.7 MG/DL — HIGH (ref 2.5–4.5)
PLATELET # BLD AUTO: 445 K/UL — HIGH (ref 150–400)
POTASSIUM SERPL-MCNC: 4.9 MMOL/L — SIGNIFICANT CHANGE UP (ref 3.5–5.3)
POTASSIUM SERPL-SCNC: 4.9 MMOL/L — SIGNIFICANT CHANGE UP (ref 3.5–5.3)
PROT SERPL-MCNC: 6 G/DL — SIGNIFICANT CHANGE UP (ref 6–8.3)
RBC # BLD: 3.58 M/UL — LOW (ref 3.8–5.2)
RBC # FLD: 21.5 % — HIGH (ref 10.3–14.5)
RH IG SCN BLD-IMP: POSITIVE — SIGNIFICANT CHANGE UP
SODIUM SERPL-SCNC: 135 MMOL/L — SIGNIFICANT CHANGE UP (ref 135–145)
SPECIMEN SOURCE: SIGNIFICANT CHANGE UP
SPECIMEN SOURCE: SIGNIFICANT CHANGE UP
WBC # BLD: 15.05 K/UL — HIGH (ref 3.8–10.5)
WBC # FLD AUTO: 15.05 K/UL — HIGH (ref 3.8–10.5)

## 2017-09-09 PROCEDURE — 99233 SBSQ HOSP IP/OBS HIGH 50: CPT | Mod: GC

## 2017-09-09 RX ORDER — SIMETHICONE 80 MG/1
80 TABLET, CHEWABLE ORAL
Qty: 0 | Refills: 0 | Status: DISCONTINUED | OUTPATIENT
Start: 2017-09-09 | End: 2017-09-18

## 2017-09-09 RX ADMIN — Medication 100 MILLIGRAM(S): at 05:10

## 2017-09-09 RX ADMIN — SIMETHICONE 80 MILLIGRAM(S): 80 TABLET, CHEWABLE ORAL at 18:21

## 2017-09-09 RX ADMIN — Medication 100 MILLIGRAM(S): at 18:21

## 2017-09-09 RX ADMIN — Medication 650 MILLIGRAM(S): at 12:20

## 2017-09-09 RX ADMIN — AMLODIPINE BESYLATE 10 MILLIGRAM(S): 2.5 TABLET ORAL at 05:10

## 2017-09-09 RX ADMIN — Medication 650 MILLIGRAM(S): at 05:10

## 2017-09-09 RX ADMIN — Medication 650 MILLIGRAM(S): at 23:40

## 2017-09-09 RX ADMIN — SENNA PLUS 2 TABLET(S): 8.6 TABLET ORAL at 21:12

## 2017-09-09 RX ADMIN — OXYCODONE HYDROCHLORIDE 2.5 MILLIGRAM(S): 5 TABLET ORAL at 00:15

## 2017-09-09 RX ADMIN — HYDROMORPHONE HYDROCHLORIDE 0.5 MILLIGRAM(S): 2 INJECTION INTRAMUSCULAR; INTRAVENOUS; SUBCUTANEOUS at 23:50

## 2017-09-09 RX ADMIN — OXYCODONE HYDROCHLORIDE 2.5 MILLIGRAM(S): 5 TABLET ORAL at 00:45

## 2017-09-09 RX ADMIN — Medication 650 MILLIGRAM(S): at 18:21

## 2017-09-09 RX ADMIN — Medication 650 MILLIGRAM(S): at 00:00

## 2017-09-09 NOTE — PROGRESS NOTE ADULT - PROBLEM SELECTOR PLAN 8
Consider SCDs    Kala Mera MD  Internal Medicine, Intern  Pager [phone] SCDs given hemoperitoneum    Kala Mera MD  Internal Medicine, Intern  Pager [phone]

## 2017-09-09 NOTE — PROGRESS NOTE ADULT - PROBLEM SELECTOR PLAN 5
Continue to monitor CBCs Q12  - Transfuse for hg >7.0 or if symptomatic   - Continue orthostatic blood pressures Continue to monitor CBCs Q12  - Transfuse for hg <7.0 or if symptomatic   - Continue orthostatic blood pressures

## 2017-09-09 NOTE — PROGRESS NOTE ADULT - PROBLEM SELECTOR PLAN 1
Likely 2/2 hematomas and underlying abdominal irritation. Stable on exam. Had repeat CT with mildly enlarged hematoma, but overall improvement of hemoperitoneum  - No surgical intervention per surgery  - Continue Q12 CBCs  - Off cefepime   - Continue to follow-up ID recommendations Likely 2/2 hematomas and underlying abdominal irritation. Stable on exam. Had repeat CT with mildly enlarged hematoma, but overall improvement of hemoperitoneum  - No surgical intervention per surgery. Reconsulted regarding CTAP result.  - Continue Q12 CBCs  - Continue to follow-up ID recommendations - OFF cefepine  - dilaudid prn severe pain, oxy prn mod pain. c/w bowel regimen

## 2017-09-09 NOTE — PROGRESS NOTE ADULT - SUBJECTIVE AND OBJECTIVE BOX
Patient is a 57y old  Female who presents with a chief complaint of Peritonitis, Hemoperitoneum (03 Sep 2017 20:09)      SUBJECTIVE / OVERNIGHT EVENTS:  No overnight events. Had CT abdomen with mildly increased posterior/inferior hematoma, otherwise hemoperitoneum had improved. H+H continues to be stable. Abdominal pain continues to be at baseline. Has not had further bowel movement since yesterday. Intermittently passes flatus    MEDICATIONS  (STANDING):  amLODIPine   Tablet 10 milliGRAM(s) Oral daily  senna 2 Tablet(s) Oral at bedtime  docusate sodium 100 milliGRAM(s) Oral two times a day  sodium bicarbonate 650 milliGRAM(s) Oral four times a day  polyethylene glycol 3350 17 Gram(s) Oral daily    MEDICATIONS  (PRN):  acetaminophen   Tablet. 650 milliGRAM(s) Oral every 6 hours PRN headache  HYDROmorphone  Injectable 0.5 milliGRAM(s) IV Push every 4 hours PRN Severe Pain (7 - 10)  oxyCODONE    IR 2.5 milliGRAM(s) Oral daily PRN Moderate Pain (4 - 6)      Vital Signs Last 24 Hrs  T(C): 37.3 (09 Sep 2017 05:00), Max: 37.3 (09 Sep 2017 05:00)  T(F): 99.1 (09 Sep 2017 05:00), Max: 99.1 (09 Sep 2017 05:00)  HR: 92 (09 Sep 2017 05:00) (89 - 92)  BP: 121/77 (09 Sep 2017 05:00) (121/77 - 133/84)  BP(mean): --  RR: 18 (09 Sep 2017 05:00) (18 - 18)  SpO2: 94% (09 Sep 2017 05:00) (94% - 99%)    I&O's Summary    08 Sep 2017 07:01  -  09 Sep 2017 07:00  --------------------------------------------------------  IN: 960 mL / OUT: 0 mL / NET: 960 mL    09 Sep 2017 07:01  -  09 Sep 2017 13:19  --------------------------------------------------------  IN: 140 mL / OUT: 0 mL / NET: 140 mL        PHYSICAL EXAM:  GENERAL: NAD, well-developed  HEAD:  Atraumatic, Normocephalic  CHEST/LUNG: Clear to auscultation bilaterally; No wheezes, rhonchi or rales  HEART: Regular rate and rhythm; No murmurs, rubs, or gallops  ABDOMEN: Tender with guarding near site on right (near catheter)  EXTREMITIES:  No edema  PSYCH: Appropriate  SKIN: No rashes or lesions    LABS:                        8.0    15.05 )-----------( 445      ( 09 Sep 2017 09:18 )             26.8     09-09    135  |  101  |  35<H>  ----------------------------<  94  4.9   |  19<L>  |  4.34<H>    Ca    8.1<L>      09 Sep 2017 09:12  Phos  4.7     09-09  Mg     1.9     09-09    TPro  6.0  /  Alb  2.8<L>  /  TBili  1.2  /  DBili  x   /  AST  16  /  ALT  5<L>  /  AlkPhos  135<H>  09-09              RADIOLOGY & ADDITIONAL TESTS:    Imaging Personally Reviewed:  - Please see above    Consultant(s) Notes Reviewed:    - Nephrology and surgery    Care Discussed with Consultants/Other Providers:  - Nephrology Patient is a 57y old  Female who presents with a chief complaint of Peritonitis, Hemoperitoneum (03 Sep 2017 20:09)    SUBJECTIVE / OVERNIGHT EVENTS:  No overnight events. Had CT abdomen with mildly increased posterior/inferior hematoma, otherwise hemoperitoneum had improved. Abdominal pain continues to be at baseline. Has not had further bowel movement since yesterday. Intermittently passes flatus. Tolerating PO. Asking for more food.    MEDICATIONS  (STANDING):  amLODIPine   Tablet 10 milliGRAM(s) Oral daily  senna 2 Tablet(s) Oral at bedtime  docusate sodium 100 milliGRAM(s) Oral two times a day  sodium bicarbonate 650 milliGRAM(s) Oral four times a day  polyethylene glycol 3350 17 Gram(s) Oral daily    MEDICATIONS  (PRN):  acetaminophen   Tablet. 650 milliGRAM(s) Oral every 6 hours PRN headache  HYDROmorphone  Injectable 0.5 milliGRAM(s) IV Push every 4 hours PRN Severe Pain (7 - 10)  oxyCODONE    IR 2.5 milliGRAM(s) Oral daily PRN Moderate Pain (4 - 6)      Vital Signs Last 24 Hrs  T(C): 37.3 (09 Sep 2017 05:00), Max: 37.3 (09 Sep 2017 05:00)  T(F): 99.1 (09 Sep 2017 05:00), Max: 99.1 (09 Sep 2017 05:00)  HR: 92 (09 Sep 2017 05:00) (89 - 92)  BP: 121/77 (09 Sep 2017 05:00) (121/77 - 133/84)  BP(mean): --  RR: 18 (09 Sep 2017 05:00) (18 - 18)  SpO2: 94% (09 Sep 2017 05:00) (94% - 99%)    I&O's Summary    08 Sep 2017 07:01  -  09 Sep 2017 07:00  --------------------------------------------------------  IN: 960 mL / OUT: 0 mL / NET: 960 mL    09 Sep 2017 07:01  -  09 Sep 2017 13:19  --------------------------------------------------------  IN: 140 mL / OUT: 0 mL / NET: 140 mL      PHYSICAL EXAM:  GENERAL: NAD, well-developed  HEAD:  Atraumatic, Normocephalic  CHEST/LUNG: Clear to auscultation bilaterally; No wheezes, rhonchi or rales  HEART: Regular rate and rhythm; No murmurs, rubs, or gallops  ABDOMEN: Tender with mild guarding near site on right (near catheter), no rebound.   EXTREMITIES:  No edema  PSYCH: Appropriate  SKIN: No rashes or lesions    LABS:                         8.0    15.05 )-----------( 445      ( 09 Sep 2017 09:18 )             26.8     09-09    135  |  101  |  35<H>  ----------------------------<  94  4.9   |  19<L>  |  4.34<H>    Ca    8.1<L>      09 Sep 2017 09:12  Phos  4.7     09-09  Mg     1.9     09-09    TPro  6.0  /  Alb  2.8<L>  /  TBili  1.2  /  DBili  x   /  AST  16  /  ALT  5<L>  /  AlkPhos  135<H>  09-09    Labs reviewed remarkable hgb stable 8-9 but fluctuates. WBC downtrending.   CTAP result reviewed w/ gastric/perisplenic pancreatic varices, ?atrophic kidneys, incr in size of epigastric hematoma

## 2017-09-09 NOTE — PROGRESS NOTE ADULT - ASSESSMENT
56 yo F pmhx  HTN, polycythemia vera, ESRD on PD (2/2 chronic GN) p/w hemoperitoneum and symptomatic hemorrhagic anemia. 58 yo F pmhx  HTN, polycythemia vera, ESRD on PD (2/2 chronic GN) p/w hemoperitoneum and symptomatic hemorrhagic anemia.

## 2017-09-09 NOTE — PROGRESS NOTE ADULT - PROBLEM SELECTOR PLAN 3
Holding PD currently. No urgent need for dialysis currently.   - Planning to call vascular surgery for HD preparation  - Continue to follow nephrology recommendations

## 2017-09-09 NOTE — DOWNTIME INTERRUPTION NOTE - WHICH MANUAL FORMS INITIATED?
Please see paper chart for   A&I  Outcome education      All other forms manually entered  I/O's  EMAR  Vital Signs

## 2017-09-10 LAB
ALBUMIN SERPL ELPH-MCNC: 2.5 G/DL — LOW (ref 3.3–5)
ALP SERPL-CCNC: 131 U/L — HIGH (ref 40–120)
ALT FLD-CCNC: 6 U/L — LOW (ref 10–45)
ANION GAP SERPL CALC-SCNC: 13 MMOL/L — SIGNIFICANT CHANGE UP (ref 5–17)
AST SERPL-CCNC: 14 U/L — SIGNIFICANT CHANGE UP (ref 10–40)
BILIRUB SERPL-MCNC: 1.1 MG/DL — SIGNIFICANT CHANGE UP (ref 0.2–1.2)
BUN SERPL-MCNC: 33 MG/DL — HIGH (ref 7–23)
CALCIUM SERPL-MCNC: 7.8 MG/DL — LOW (ref 8.4–10.5)
CHLORIDE SERPL-SCNC: 104 MMOL/L — SIGNIFICANT CHANGE UP (ref 96–108)
CO2 SERPL-SCNC: 19 MMOL/L — LOW (ref 22–31)
CREAT SERPL-MCNC: 4.22 MG/DL — HIGH (ref 0.5–1.3)
GLUCOSE SERPL-MCNC: 92 MG/DL — SIGNIFICANT CHANGE UP (ref 70–99)
HCT VFR BLD CALC: 26.3 % — LOW (ref 34.5–45)
HGB BLD-MCNC: 7.8 G/DL — LOW (ref 11.5–15.5)
MAGNESIUM SERPL-MCNC: 1.9 MG/DL — SIGNIFICANT CHANGE UP (ref 1.6–2.6)
MCHC RBC-ENTMCNC: 22.4 PG — LOW (ref 27–34)
MCHC RBC-ENTMCNC: 29.7 GM/DL — LOW (ref 32–36)
MCV RBC AUTO: 75.6 FL — LOW (ref 80–100)
PHOSPHATE SERPL-MCNC: 4.3 MG/DL — SIGNIFICANT CHANGE UP (ref 2.5–4.5)
PLATELET # BLD AUTO: 438 K/UL — HIGH (ref 150–400)
POTASSIUM SERPL-MCNC: 5 MMOL/L — SIGNIFICANT CHANGE UP (ref 3.5–5.3)
POTASSIUM SERPL-SCNC: 5 MMOL/L — SIGNIFICANT CHANGE UP (ref 3.5–5.3)
PROT SERPL-MCNC: 5.9 G/DL — LOW (ref 6–8.3)
RBC # BLD: 3.48 M/UL — LOW (ref 3.8–5.2)
RBC # FLD: 21.7 % — HIGH (ref 10.3–14.5)
SODIUM SERPL-SCNC: 136 MMOL/L — SIGNIFICANT CHANGE UP (ref 135–145)
WBC # BLD: 14.67 K/UL — HIGH (ref 3.8–10.5)
WBC # FLD AUTO: 14.67 K/UL — HIGH (ref 3.8–10.5)

## 2017-09-10 PROCEDURE — 99233 SBSQ HOSP IP/OBS HIGH 50: CPT | Mod: GC

## 2017-09-10 RX ORDER — DIPHENHYDRAMINE HCL 50 MG
25 CAPSULE ORAL
Qty: 0 | Refills: 0 | Status: DISCONTINUED | OUTPATIENT
Start: 2017-09-10 | End: 2017-09-18

## 2017-09-10 RX ORDER — LACTULOSE 10 G/15ML
5 SOLUTION ORAL DAILY
Qty: 0 | Refills: 0 | Status: DISCONTINUED | OUTPATIENT
Start: 2017-09-10 | End: 2017-09-18

## 2017-09-10 RX ADMIN — AMLODIPINE BESYLATE 10 MILLIGRAM(S): 2.5 TABLET ORAL at 06:32

## 2017-09-10 RX ADMIN — OXYCODONE HYDROCHLORIDE 2.5 MILLIGRAM(S): 5 TABLET ORAL at 23:39

## 2017-09-10 RX ADMIN — HYDROMORPHONE HYDROCHLORIDE 0.5 MILLIGRAM(S): 2 INJECTION INTRAMUSCULAR; INTRAVENOUS; SUBCUTANEOUS at 00:05

## 2017-09-10 RX ADMIN — SIMETHICONE 80 MILLIGRAM(S): 80 TABLET, CHEWABLE ORAL at 06:32

## 2017-09-10 RX ADMIN — Medication 650 MILLIGRAM(S): at 06:32

## 2017-09-10 RX ADMIN — Medication 100 MILLIGRAM(S): at 17:26

## 2017-09-10 RX ADMIN — SIMETHICONE 80 MILLIGRAM(S): 80 TABLET, CHEWABLE ORAL at 17:26

## 2017-09-10 RX ADMIN — Medication 650 MILLIGRAM(S): at 23:34

## 2017-09-10 RX ADMIN — Medication 650 MILLIGRAM(S): at 11:07

## 2017-09-10 RX ADMIN — LACTULOSE 5 GRAM(S): 10 SOLUTION ORAL at 12:55

## 2017-09-10 RX ADMIN — Medication 100 MILLIGRAM(S): at 06:32

## 2017-09-10 RX ADMIN — SENNA PLUS 2 TABLET(S): 8.6 TABLET ORAL at 21:41

## 2017-09-10 RX ADMIN — Medication 650 MILLIGRAM(S): at 17:26

## 2017-09-10 NOTE — PROGRESS NOTE ADULT - SUBJECTIVE AND OBJECTIVE BOX
Green Surgery Progress Note    Pt seen and examined at bedside. Pain controlled with medications. No nausea or emesis. Passing flatus but has not had bowel movement in two days. Feels more distended. Ambulating and voiding.    PE  Gen: NAD, AOx4  Abd: soft, nondistended, tender in LUQ and LLQ, PD cath intact    Vital Signs Last 24 Hrs  T(C): 37.2 (09-10-17 @ 06:19), Max: 37.2 (09-10-17 @ 01:26)  T(F): 98.9 (09-10-17 @ 06:19), Max: 98.9 (09-10-17 @ 01:26)  HR: 87 (09-10-17 @ 06:19) (87 - 95)  BP: 123/76 (09-10-17 @ 06:19) (117/73 - 136/91)  BP(mean): --  RR: 18 (09-10-17 @ 06:19) (18 - 18)  SpO2: 96% (09-10-17 @ 06:19) (95% - 100%)  I&O's Detail    09 Sep 2017 07:01  -  10 Sep 2017 07:00  --------------------------------------------------------  IN:    Oral Fluid: 860 mL  Total IN: 860 mL    OUT:  Total OUT: 0 mL    Total NET: 860 mL                              7.8    14.67 )-----------( 438      ( 10 Sep 2017 08:13 )             26.3     09-10    136  |  104  |  33<H>  ----------------------------<  92  5.0   |  19<L>  |  4.22<H>    Ca    7.8<L>      10 Sep 2017 08:24  Phos  4.3     09-10  Mg     1.9     09-10    TPro  5.9<L>  /  Alb  2.5<L>  /  TBili  1.1  /  DBili  x   /  AST  14  /  ALT  6<L>  /  AlkPhos  131<H>  09-10      CAPILLARY BLOOD GLUCOSE    MEDICATIONS  (STANDING):  amLODIPine   Tablet 10 milliGRAM(s) Oral daily  senna 2 Tablet(s) Oral at bedtime  docusate sodium 100 milliGRAM(s) Oral two times a day  sodium bicarbonate 650 milliGRAM(s) Oral four times a day  polyethylene glycol 3350 17 Gram(s) Oral daily  simethicone 80 milliGRAM(s) Chew two times a day  lactulose Syrup 5 Gram(s) Oral daily    MEDICATIONS  (PRN):  acetaminophen   Tablet. 650 milliGRAM(s) Oral every 6 hours PRN headache  HYDROmorphone  Injectable 0.5 milliGRAM(s) IV Push every 4 hours PRN Severe Pain (7 - 10)  oxyCODONE    IR 2.5 milliGRAM(s) Oral daily PRN Moderate Pain (4 - 6)  diphenhydrAMINE   Capsule 25 milliGRAM(s) Oral two times a day PRN Rash and/or Itching

## 2017-09-10 NOTE — PROGRESS NOTE ADULT - PROBLEM SELECTOR PLAN 1
Likely 2/2 hematomas and underlying abdominal irritation. Stable on exam. Had repeat CT with mildly enlarged hematoma, but overall improvement of hemoperitoneum  - No surgical intervention per surgery. Reconsulted regarding CTAP result.  - Continue Q12 CBCs  - Continue to follow-up ID recommendations - OFF cefepine  - dilaudid prn severe pain, oxy prn mod pain. c/w bowel regimen Likely 2/2 hematomas and underlying abdominal irritation. Stable on exam. Had repeat CT with mildly enlarged hematoma, but overall improvement of hemoperitoneum  - No surgical intervention per surgery. Reconsulted regarding CTAP result.  - Continue Q12 CBCs  - Continue to follow-up ID recommendations - OFF cefepine  - Dilaudid prn severe pain, oxy prn mod pain. c/w bowel regimen  - Benadryl for abdominal itchiness PRN  - Adding lactulose to bowel regimen. Avoid enemas given history of ESRD (concern for phosphorous absorption). Likely 2/2 hematomas and underlying abdominal irritation. Stable on exam. Had repeat CT with mildly enlarged hematoma, but overall improvement of hemoperitoneum  - No surgical intervention per surgery. Spoke with surg regarding CTAP result.  - Continue Q12 CBCs  - Continue to follow-up ID recommendations - OFF cefepine  - Dilaudid prn severe pain, oxy prn mod pain.  - Benadryl for abdominal itchiness PRN  -  c/w bowel regimen. Adding lactulose to bowel regimen. Avoid enemas given history of ESRD (concern for phosphorous absorption).

## 2017-09-10 NOTE — PROGRESS NOTE ADULT - PROBLEM SELECTOR PLAN 2
Possibly 2/2 traction of PD catheter with underlying varices  - CT abdomen/pelvis with hemoperitoneum, multiple hematomas and varices, incr in size of epigastric hematoma - surg reconsulted  - Continue Q12 CBCs  - Avoiding further lavages Possibly 2/2 traction of PD catheter with underlying varices  - CT abdomen/pelvis with hemoperitoneum, multiple hematomas and varices, incr in size of epigastric hematoma - surg reconsulted  - Check CBC daily  - Avoiding further lavages

## 2017-09-10 NOTE — PROGRESS NOTE ADULT - ASSESSMENT
57y year old Female who presents with hemoperitoneum, patient had ESRD with peritoneal dialysis catheter placed on August, presented to OSH with bleeding in abdomen, transferred to [facility] for further management, now with stable H/H.     Repeat CT done yesterday shows epigastric hematoma mildly increased in size measuring 9.1 x  7.3 cm. Pelvic hematoma resolving with a small amount of residual blood in the pelvis.    CT findings consistent with hematoma being reabsorbed. As HCT stable over last week, no indications that pt is bleeding actively. Since PD lavages have been successful, no reason to d/c PD cath. Pt will have some degree of pain as long as hematoma present secondary to peritoneal irritation.     - CBC stable, continue to monitor  - No surgical intervention at this time  - D/w Dr. Héctor Zayas PGY 2  [phone]

## 2017-09-10 NOTE — PROGRESS NOTE ADULT - PROBLEM SELECTOR PLAN 3
Holding PD currently. No urgent need for dialysis currently.   - Planning to call vascular surgery for HD preparation (early Monday as private office was closed for weekend)  - Continue to follow nephrology recommendations Holding PD currently. No urgent need for dialysis currently.   - Planning to call vascular surgery for HD preparation  - Continue to follow nephrology recommendations Holding PD currently. No urgent need for dialysis currently.   - Planning to call vascular surgery for HD preparation  - ordered vein mapping  - Continue to follow nephrology recommendations

## 2017-09-10 NOTE — PROGRESS NOTE ADULT - PROBLEM SELECTOR PLAN 5
Continue to monitor CBCs Q12  - Transfuse for hg <7.0 or if symptomatic   - Continue orthostatic blood pressures Continue to monitor CBCs  - Transfuse for hg <7.0 or if symptomatic   - Continue orthostatic blood pressures

## 2017-09-10 NOTE — PROGRESS NOTE ADULT - PROBLEM SELECTOR PLAN 4
Radiology updated report, reported varices rather than lesions. Radiology updated report, reported varices rather than lesions. Pt aware of findings.

## 2017-09-10 NOTE — PROGRESS NOTE ADULT - SUBJECTIVE AND OBJECTIVE BOX
Patient is a 57y old  Female who presents with a chief complaint of Peritonitis, Hemoperitoneum (03 Sep 2017 20:09)      SUBJECTIVE / OVERNIGHT EVENTS:  No events overnight.     MEDICATIONS  (STANDING):  amLODIPine   Tablet 10 milliGRAM(s) Oral daily  senna 2 Tablet(s) Oral at bedtime  docusate sodium 100 milliGRAM(s) Oral two times a day  sodium bicarbonate 650 milliGRAM(s) Oral four times a day  polyethylene glycol 3350 17 Gram(s) Oral daily  simethicone 80 milliGRAM(s) Chew two times a day    MEDICATIONS  (PRN):  acetaminophen   Tablet. 650 milliGRAM(s) Oral every 6 hours PRN headache  HYDROmorphone  Injectable 0.5 milliGRAM(s) IV Push every 4 hours PRN Severe Pain (7 - 10)  oxyCODONE    IR 2.5 milliGRAM(s) Oral daily PRN Moderate Pain (4 - 6)      Vital Signs Last 24 Hrs  T(C): 37.2 (10 Sep 2017 06:19), Max: 37.2 (10 Sep 2017 01:26)  T(F): 98.9 (10 Sep 2017 06:19), Max: 98.9 (10 Sep 2017 01:26)  HR: 87 (10 Sep 2017 06:19) (87 - 95)  BP: 123/76 (10 Sep 2017 06:19) (117/73 - 136/91)  BP(mean): --  RR: 18 (10 Sep 2017 06:19) (18 - 18)  SpO2: 96% (10 Sep 2017 06:19) (95% - 100%)      I&O's Summary    09 Sep 2017 07:01  -  10 Sep 2017 07:00  --------------------------------------------------------  IN: 860 mL / OUT: 0 mL / NET: 860 mL        PHYSICAL EXAM:  GENERAL: NAD, well-developed  HEAD:  Atraumatic, Normocephalic  EYES:  NECK:   CHEST/LUNG: Clear to auscultation bilaterally; No wheezes, rhonchi or gallops  HEART: Regular rate and rhythm; No murmurs, rubs, or gallops  ABDOMEN: Soft, Nontender, Nondistended; Bowel sounds present  EXTREMITIES:  2+ Peripheral Pulses, No clubbing, cyanosis, or edema  PSYCH: Appropriate  NEUROLOGY: non-focal  SKIN: No rashes or lesions    LABS:                        8.0    15.05 )-----------( 445      ( 09 Sep 2017 09:18 )             26.8     09-09    135  |  101  |  35<H>  ----------------------------<  94  4.9   |  19<L>  |  4.34<H>    Ca    8.1<L>      09 Sep 2017 09:12  Phos  4.7     09-09  Mg     1.9     09-09    TPro  6.0  /  Alb  2.8<L>  /  TBili  1.2  /  DBili  x   /  AST  16  /  ALT  5<L>  /  AlkPhos  135<H>  09-09 Patient is a 57y old  Female who presents with a chief complaint of Peritonitis, Hemoperitoneum (03 Sep 2017 20:09)      SUBJECTIVE / OVERNIGHT EVENTS:  No events overnight. Continues to feel abdominal itchiness. Pain worse at night. Has not had bowel movement.     MEDICATIONS  (STANDING):  amLODIPine   Tablet 10 milliGRAM(s) Oral daily  senna 2 Tablet(s) Oral at bedtime  docusate sodium 100 milliGRAM(s) Oral two times a day  sodium bicarbonate 650 milliGRAM(s) Oral four times a day  polyethylene glycol 3350 17 Gram(s) Oral daily  simethicone 80 milliGRAM(s) Chew two times a day    MEDICATIONS  (PRN):  acetaminophen   Tablet. 650 milliGRAM(s) Oral every 6 hours PRN headache  HYDROmorphone  Injectable 0.5 milliGRAM(s) IV Push every 4 hours PRN Severe Pain (7 - 10)  oxyCODONE    IR 2.5 milliGRAM(s) Oral daily PRN Moderate Pain (4 - 6)      Vital Signs Last 24 Hrs  T(C): 37.2 (10 Sep 2017 06:19), Max: 37.2 (10 Sep 2017 01:26)  T(F): 98.9 (10 Sep 2017 06:19), Max: 98.9 (10 Sep 2017 01:26)  HR: 87 (10 Sep 2017 06:19) (87 - 95)  BP: 123/76 (10 Sep 2017 06:19) (117/73 - 136/91)  BP(mean): --  RR: 18 (10 Sep 2017 06:19) (18 - 18)  SpO2: 96% (10 Sep 2017 06:19) (95% - 100%)      I&O's Summary    09 Sep 2017 07:01  -  10 Sep 2017 07:00  --------------------------------------------------------  IN: 860 mL / OUT: 0 mL / NET: 860 mL        PHYSICAL EXAM:  GENERAL: NAD, well-developed  HEAD:  Atraumatic, Normocephalic  CHEST/LUNG: Clear to auscultation bilaterally; No wheezes, rhonchi or gallops  HEART: Regular rate and rhythm; No murmurs, rubs, or gallops  ABDOMEN: Continues to have abdominal tenderness to palpation and guarding  EXTREMITIES:  No edema  PSYCH: Appropriate, at baseline  NEUROLOGY: non-focal    LABS:                        8.0    15.05 )-----------( 445      ( 09 Sep 2017 09:18 )             26.8     09-09    135  |  101  |  35<H>  ----------------------------<  94  4.9   |  19<L>  |  4.34<H>    Ca    8.1<L>      09 Sep 2017 09:12  Phos  4.7     09-09  Mg     1.9     09-09    TPro  6.0  /  Alb  2.8<L>  /  TBili  1.2  /  DBili  x   /  AST  16  /  ALT  5<L>  /  AlkPhos  135<H>  09-09 Patient is a 57y old  Female who presents with a chief complaint of Peritonitis, Hemoperitoneum (03 Sep 2017 20:09)      SUBJECTIVE / OVERNIGHT EVENTS:  No events overnight. Continues to feel abdominal itchiness. Pain worse at night. Has not had bowel movement.     MEDICATIONS  (STANDING):  amLODIPine   Tablet 10 milliGRAM(s) Oral daily  senna 2 Tablet(s) Oral at bedtime  docusate sodium 100 milliGRAM(s) Oral two times a day  sodium bicarbonate 650 milliGRAM(s) Oral four times a day  polyethylene glycol 3350 17 Gram(s) Oral daily  simethicone 80 milliGRAM(s) Chew two times a day    MEDICATIONS  (PRN):  acetaminophen   Tablet. 650 milliGRAM(s) Oral every 6 hours PRN headache  HYDROmorphone  Injectable 0.5 milliGRAM(s) IV Push every 4 hours PRN Severe Pain (7 - 10)  oxyCODONE    IR 2.5 milliGRAM(s) Oral daily PRN Moderate Pain (4 - 6)      Vital Signs Last 24 Hrs  T(C): 37.2 (10 Sep 2017 06:19), Max: 37.2 (10 Sep 2017 01:26)  T(F): 98.9 (10 Sep 2017 06:19), Max: 98.9 (10 Sep 2017 01:26)  HR: 87 (10 Sep 2017 06:19) (87 - 95)  BP: 123/76 (10 Sep 2017 06:19) (117/73 - 136/91)  BP(mean): --  RR: 18 (10 Sep 2017 06:19) (18 - 18)  SpO2: 96% (10 Sep 2017 06:19) (95% - 100%)      I&O's Summary    09 Sep 2017 07:01  -  10 Sep 2017 07:00  --------------------------------------------------------  IN: 860 mL / OUT: 0 mL / NET: 860 mL        PHYSICAL EXAM:  GENERAL: NAD, well-developed  HEAD:  Atraumatic, Normocephalic  CHEST/LUNG: Clear to auscultation bilaterally; No wheezes, rhonchi or gallops  HEART: Regular rate and rhythm; No murmurs, rubs, or gallops  ABDOMEN: Continues to have luq and llq abdominal tenderness to palpation and guarding though improved from yesterday's exam  EXTREMITIES:  No edema  PSYCH: Appropriate, at baseline  NEUROLOGY: non-focal    LABS:                         7.8    14.67 )-----------( 438      ( 10 Sep 2017 08:13 )             26.3     09-10    136  |  104  |  33<H>  ----------------------------<  92  5.0   |  19<L>  |  4.22<H>    Ca    7.8<L>      10 Sep 2017 08:24  Phos  4.3     09-10  Mg     1.9     09-10    TPro  5.9<L>  /  Alb  2.5<L>  /  TBili  1.1  /  DBili  x   /  AST  14  /  ALT  6<L>  /  AlkPhos  131<H>  09-10    Labs reviewed remarkable for slight decr in hgb, WBC ELEV.

## 2017-09-10 NOTE — PROGRESS NOTE ADULT - PROBLEM SELECTOR PLAN 7
Continue to monitor hemoglobin  - Continue to hold hydroxyurea Continue to monitor hemoglobin  - Continue to hold hydroxyurea/folate - readdress regarding restarting

## 2017-09-11 LAB
ANION GAP SERPL CALC-SCNC: 16 MMOL/L — SIGNIFICANT CHANGE UP (ref 5–17)
BUN SERPL-MCNC: 37 MG/DL — HIGH (ref 7–23)
CALCIUM SERPL-MCNC: 7.9 MG/DL — LOW (ref 8.4–10.5)
CHLORIDE SERPL-SCNC: 102 MMOL/L — SIGNIFICANT CHANGE UP (ref 96–108)
CO2 SERPL-SCNC: 18 MMOL/L — LOW (ref 22–31)
CREAT SERPL-MCNC: 4.23 MG/DL — HIGH (ref 0.5–1.3)
CULTURE RESULTS: SIGNIFICANT CHANGE UP
GLUCOSE SERPL-MCNC: 82 MG/DL — SIGNIFICANT CHANGE UP (ref 70–99)
HCT VFR BLD CALC: 27.7 % — LOW (ref 34.5–45)
HGB BLD-MCNC: 8.3 G/DL — LOW (ref 11.5–15.5)
MAGNESIUM SERPL-MCNC: 1.9 MG/DL — SIGNIFICANT CHANGE UP (ref 1.6–2.6)
MCHC RBC-ENTMCNC: 22.4 PG — LOW (ref 27–34)
MCHC RBC-ENTMCNC: 30 GM/DL — LOW (ref 32–36)
MCV RBC AUTO: 74.9 FL — LOW (ref 80–100)
PHOSPHATE SERPL-MCNC: 4 MG/DL — SIGNIFICANT CHANGE UP (ref 2.5–4.5)
PLATELET # BLD AUTO: 458 K/UL — HIGH (ref 150–400)
POTASSIUM SERPL-MCNC: 5.2 MMOL/L — SIGNIFICANT CHANGE UP (ref 3.5–5.3)
POTASSIUM SERPL-SCNC: 5.2 MMOL/L — SIGNIFICANT CHANGE UP (ref 3.5–5.3)
RBC # BLD: 3.7 M/UL — LOW (ref 3.8–5.2)
RBC # FLD: 21.5 % — HIGH (ref 10.3–14.5)
SODIUM SERPL-SCNC: 136 MMOL/L — SIGNIFICANT CHANGE UP (ref 135–145)
SPECIMEN SOURCE: SIGNIFICANT CHANGE UP
WBC # BLD: 16.35 K/UL — HIGH (ref 3.8–10.5)
WBC # FLD AUTO: 16.35 K/UL — HIGH (ref 3.8–10.5)

## 2017-09-11 PROCEDURE — 99233 SBSQ HOSP IP/OBS HIGH 50: CPT | Mod: GC

## 2017-09-11 PROCEDURE — 99232 SBSQ HOSP IP/OBS MODERATE 35: CPT

## 2017-09-11 RX ADMIN — AMLODIPINE BESYLATE 10 MILLIGRAM(S): 2.5 TABLET ORAL at 05:31

## 2017-09-11 RX ADMIN — Medication 650 MILLIGRAM(S): at 17:04

## 2017-09-11 RX ADMIN — Medication 100 MILLIGRAM(S): at 17:04

## 2017-09-11 RX ADMIN — LACTULOSE 5 GRAM(S): 10 SOLUTION ORAL at 11:24

## 2017-09-11 RX ADMIN — OXYCODONE HYDROCHLORIDE 2.5 MILLIGRAM(S): 5 TABLET ORAL at 00:30

## 2017-09-11 RX ADMIN — Medication 100 MILLIGRAM(S): at 05:31

## 2017-09-11 RX ADMIN — Medication 650 MILLIGRAM(S): at 05:31

## 2017-09-11 RX ADMIN — SIMETHICONE 80 MILLIGRAM(S): 80 TABLET, CHEWABLE ORAL at 05:31

## 2017-09-11 RX ADMIN — SIMETHICONE 80 MILLIGRAM(S): 80 TABLET, CHEWABLE ORAL at 17:04

## 2017-09-11 RX ADMIN — Medication 650 MILLIGRAM(S): at 17:57

## 2017-09-11 RX ADMIN — Medication 650 MILLIGRAM(S): at 11:24

## 2017-09-11 RX ADMIN — SENNA PLUS 2 TABLET(S): 8.6 TABLET ORAL at 21:38

## 2017-09-11 NOTE — PROGRESS NOTE ADULT - SUBJECTIVE AND OBJECTIVE BOX
Sara Zurita MD  PGY-3, Medicine Team 3  pager: [phone]    Patient is a 57y old  Female who presents with a chief complaint of Peritonitis, Hemoperitoneum (03 Sep 2017 20:09)      SUBJECTIVE / OVERNIGHT EVENTS:  No acute events overnight.  "Gas pain" in lower abdomen, but no fevers, chills, n/v.   Patient aware that PD catheter will need to be removed as she is no longer a candidate for PD per Renal.  Vein mapping study ordered in preparation for AVF.   Otherwise denies chest pain, dyspnea.       Vital Signs Last 24 Hrs  T(C): 37.3 (11 Sep 2017 05:33), Max: 37.3 (11 Sep 2017 05:33)  T(F): 99.1 (11 Sep 2017 05:33), Max: 99.1 (11 Sep 2017 05:33)  HR: 89 (11 Sep 2017 05:33) (89 - 91)  BP: 129/78 (11 Sep 2017 05:33) (113/73 - 129/78)  RR: 16 (11 Sep 2017 05:33) (16 - 18)  SpO2: 96% (11 Sep 2017 05:33) (95% - 99%)      PHYSICAL EXAM:  GENERAL: NAD, well-developed  HEAD:  Atraumatic, Normocephalic  CHEST/LUNG: Clear to auscultation bilaterally; No wheezes, rhonchi or gallops  HEART: Regular rate and rhythm; No murmurs, rubs, or gallops  ABDOMEN: Mild LLQ abdominal tenderness to palpation and guarding, improved from yesterday   EXTREMITIES:  No edema  PSYCH: Appropriate, at baseline  NEUROLOGY: non-focal      LABS:                        7.8    14.67 )-----------( 438      ( 10 Sep 2017 08:13 )             26.3     09-10    136  |  104  |  33<H>  ----------------------------<  92  5.0   |  19<L>  |  4.22<H>    Ca    7.8<L>      10 Sep 2017 08:24  Phos  4.3     09-10  Mg     1.9     09-10    TPro  5.9<L>  /  Alb  2.5<L>  /  TBili  1.1  /  DBili  x   /  AST  14  /  ALT  6<L>  /  AlkPhos  131<H>  09-10      I&O's Summary    10 Sep 2017 07:01  -  11 Sep 2017 07:00  --------------------------------------------------------  IN: 1900 mL / OUT: 0 mL / NET: 1900 mL        MEDICATIONS  (STANDING):  amLODIPine   Tablet 10 milliGRAM(s) Oral daily  senna 2 Tablet(s) Oral at bedtime  docusate sodium 100 milliGRAM(s) Oral two times a day  sodium bicarbonate 650 milliGRAM(s) Oral four times a day  polyethylene glycol 3350 17 Gram(s) Oral daily  simethicone 80 milliGRAM(s) Chew two times a day  lactulose Syrup 5 Gram(s) Oral daily    MEDICATIONS  (PRN):  acetaminophen   Tablet. 650 milliGRAM(s) Oral every 6 hours PRN headache  HYDROmorphone  Injectable 0.5 milliGRAM(s) IV Push every 4 hours PRN Severe Pain (7 - 10)  oxyCODONE    IR 2.5 milliGRAM(s) Oral daily PRN Moderate Pain (4 - 6)  diphenhydrAMINE   Capsule 25 milliGRAM(s) Oral two times a day PRN Rash and/or Itching Sara Zurita MD  PGY-3, Medicine Team 3  pager: [phone]    Patient is a 57y old  Female who presents with a chief complaint of Peritonitis, Hemoperitoneum (03 Sep 2017 20:09)      SUBJECTIVE / OVERNIGHT EVENTS:  No acute events overnight.  "Gas pain" in lower abdomen, but no fevers, chills, n/v. Feels pain is improved.  Patient aware that PD catheter will need to be removed as she is no longer a candidate for PD per Renal.  Vein mapping study ordered in preparation for AVF.   Otherwise denies chest pain, dyspnea.       Vital Signs Last 24 Hrs  T(C): 37.3 (11 Sep 2017 05:33), Max: 37.3 (11 Sep 2017 05:33)  T(F): 99.1 (11 Sep 2017 05:33), Max: 99.1 (11 Sep 2017 05:33)  HR: 89 (11 Sep 2017 05:33) (89 - 91)  BP: 129/78 (11 Sep 2017 05:33) (113/73 - 129/78)  RR: 16 (11 Sep 2017 05:33) (16 - 18)  SpO2: 96% (11 Sep 2017 05:33) (95% - 99%)      PHYSICAL EXAM:  GENERAL: NAD, well-developed  HEAD:  Atraumatic, Normocephalic  CHEST/LUNG: Clear to auscultation bilaterally; No wheezes, rhonchi or gallops  HEART: Regular rate and rhythm; No murmurs, rubs, or gallops  ABDOMEN: Mild LLQ abdominal tenderness to palpation and minimal guarding, improved from yesterday   EXTREMITIES:  No edema  PSYCH: Appropriate, at baseline  NEUROLOGY: non-focal      LABS:                         8.3    16.35 )-----------( 458      ( 11 Sep 2017 08:41 )             27.7     09-11    136  |  102  |  37<H>  ----------------------------<  82  5.2   |  18<L>  |  4.23<H>    Ca    7.9<L>      11 Sep 2017 08:48  Phos  4.0     09-11  Mg     1.9     09-11    TPro  5.9<L>  /  Alb  2.5<L>  /  TBili  1.1  /  DBili  x   /  AST  14  /  ALT  6<L>  /  AlkPhos  131<H>  09-10    Labs reviewed remarkable for incr wbc, hgb stable.     I&O's Summary    10 Sep 2017 07:01  -  11 Sep 2017 07:00  --------------------------------------------------------  IN: 1900 mL / OUT: 0 mL / NET: 1900 mL    MEDICATIONS  (STANDING):  amLODIPine   Tablet 10 milliGRAM(s) Oral daily  senna 2 Tablet(s) Oral at bedtime  docusate sodium 100 milliGRAM(s) Oral two times a day  sodium bicarbonate 650 milliGRAM(s) Oral four times a day  polyethylene glycol 3350 17 Gram(s) Oral daily  simethicone 80 milliGRAM(s) Chew two times a day  lactulose Syrup 5 Gram(s) Oral daily    MEDICATIONS  (PRN):  acetaminophen   Tablet. 650 milliGRAM(s) Oral every 6 hours PRN headache  oxyCODONE    IR 2.5 milliGRAM(s) Oral daily PRN Moderate Pain (4 - 6)  diphenhydrAMINE   Capsule 25 milliGRAM(s) Oral two times a day PRN Rash and/or Itching

## 2017-09-11 NOTE — PROGRESS NOTE ADULT - SUBJECTIVE AND OBJECTIVE BOX
[facility] DIVISION OF KIDNEY DISEASES AND HYPERTENSION -- FOLLOW UP NOTE  --------------------------------------------------------------------------------  Chief Complaint:  ESRD on HD    24 hour events/subjective:  Patient reports continued abdominal pain, sharp epigastric pain that comes and goes, does not change with po intake, also present in the remainder of the abdomen.  Reports +appetite, + small bowel movement overnight.  Denies any fevers, chills, shortness of breath, palpitations.        PAST HISTORY  --------------------------------------------------------------------------------  No significant changes to PMH, PSH, FHx, SHx, unless otherwise noted    ALLERGIES & MEDICATIONS  --------------------------------------------------------------------------------  Allergies    No Known Allergies    Intolerances      Standing Inpatient Medications  amLODIPine   Tablet 10 milliGRAM(s) Oral daily  senna 2 Tablet(s) Oral at bedtime  docusate sodium 100 milliGRAM(s) Oral two times a day  sodium bicarbonate 650 milliGRAM(s) Oral four times a day  polyethylene glycol 3350 17 Gram(s) Oral daily  simethicone 80 milliGRAM(s) Chew two times a day  lactulose Syrup 5 Gram(s) Oral daily    PRN Inpatient Medications  acetaminophen   Tablet. 650 milliGRAM(s) Oral every 6 hours PRN  HYDROmorphone  Injectable 0.5 milliGRAM(s) IV Push every 4 hours PRN  oxyCODONE    IR 2.5 milliGRAM(s) Oral daily PRN  diphenhydrAMINE   Capsule 25 milliGRAM(s) Oral two times a day PRN      REVIEW OF SYSTEMS  --------------------------------------------------------------------------------  Gen: No fevers/chills  Skin: No rashes  Head/Eyes/Ears/Mouth: No headache  Respiratory: No dyspnea  CV: No chest pain  GI: +continued severe abdominal pain  : No dysuria  MSK: No edema  Neuro: No dizziness/lightheadedness    VITALS/PHYSICAL EXAM  --------------------------------------------------------------------------------  T(C): 37.3 (09-11-17 @ 05:33), Max: 37.3 (09-11-17 @ 05:33)  HR: 89 (09-11-17 @ 05:33) (89 - 91)  BP: 129/78 (09-11-17 @ 05:33) (113/73 - 129/78)  RR: 16 (09-11-17 @ 05:33) (16 - 18)  SpO2: 96% (09-11-17 @ 05:33) (95% - 99%)  Wt(kg): --        09-10-17 @ 07:01  -  09-11-17 @ 07:00  --------------------------------------------------------  IN: 1900 mL / OUT: 0 mL / NET: 1900 mL      Physical Exam:  	Gen: NAD, well-appearing  	HEENT: MMM  	Pulm: CTA B/L  	CV: RRR, S1S2; no rub  	Abd: +BS, +guarding, +tenderness to palpation  	: No suprapubic tenderness  	UE:  no edema  	LE:  no edema  	Neuro: No focal deficits  	Psych: Normal affect and mood  	Skin: Warm    LABS/STUDIES  --------------------------------------------------------------------------------              7.8    14.67 >-----------<  438      [09-10-17 @ 08:13]              26.3     136  |  102  |  37  ----------------------------<  82      [09-11-17 @ 08:48]  5.2   |  18  |  4.23        Ca     7.9     [09-11-17 @ 08:48]      Mg     1.9     [09-11-17 @ 08:48]      Phos  4.0     [09-11-17 @ 08:48]    TPro  5.9  /  Alb  2.5  /  TBili  1.1  /  DBili  x   /  AST  14  /  ALT  6   /  AlkPhos  131  [09-10-17 @ 08:24]    Creatinine Trend  SCr 4.23 [09-11 @ 08:48]  SCr 4.22 [09-10 @ 08:24]  SCr 4.34 [09-09 @ 09:12]  SCr 4.44 [09-08 @ 09:04]  SCr 4.33 [09-07 @ 08:57]

## 2017-09-11 NOTE — PROGRESS NOTE ADULT - ASSESSMENT
56 yo F with ESRD on peritoneal dialysis, PUD, polycythemia vera with episode of abd pain, hemoperitoneum. Patient had high leukocytosis on presentation, had a low grade elevated temp to 100.3F. No overt fevers, no chills, does not appear toxic. OSH reportedly with intraperitoneal leukocytosis in setting of shauna bleeding. Culture from OSH not available. Does not appear to have evidence of bowel perf or other infectious source on CT scan. Overall have low suspicion for infectious peritonitis (suspect leukocytosis, low grade elevated temp, abd pain all 2/2 to hematoma), but difficult to determine as primary diagnostic test (intraperitoneal cell count) wound be confounded by bleeding. Patient remains stable off antibiotics. No fevers, no chills. Still mild abd pain, leukocytosis.  - Continue off abx  - F/U peritoneal culture  - PD catheter removal per primary team  - Trend WBCs      Jose Luis Bui MD  Pager [phone]  After 5pm and on weekends call [phone]

## 2017-09-11 NOTE — PROGRESS NOTE ADULT - SUBJECTIVE AND OBJECTIVE BOX
CC: F/U for ? peritonitis    Saw/spoke to patient. No fevers, no chills. Still with mild abd pain. PD catheter still in place. No other events noted.    Allergies  No Known Allergies    ANTIMICROBIALS:  Off    PE:    Vital Signs Last 24 Hrs  T(C): 37.3 (11 Sep 2017 05:33), Max: 37.3 (11 Sep 2017 05:33)  T(F): 99.1 (11 Sep 2017 05:33), Max: 99.1 (11 Sep 2017 05:33)  HR: 89 (11 Sep 2017 05:33) (89 - 91)  BP: 129/78 (11 Sep 2017 05:33) (113/73 - 129/78)  BP(mean): --  RR: 16 (11 Sep 2017 05:33) (16 - 18)  SpO2: 96% (11 Sep 2017 05:33) (95% - 99%)    Gen: AOx3, NAD, non-toxic, pleasant  CV: S1+S2 normal, no murmurs, nontachycardic  Resp: Clear bilat, no resp distress, no crackles/wheezes  Abd: Soft, mildly tender, +BS, PD catheter in place  Ext: No LE edema, no wounds    LABS:                        8.3    16.35 )-----------( 458      ( 11 Sep 2017 08:41 )             27.7     09-11    136  |  102  |  37<H>  ----------------------------<  82  5.2   |  18<L>  |  4.23<H>    Ca    7.9<L>      11 Sep 2017 08:48  Phos  4.0     09-11  Mg     1.9     09-11    TPro  5.9<L>  /  Alb  2.5<L>  /  TBili  1.1  /  DBili  x   /  AST  14  /  ALT  6<L>  /  AlkPhos  131<H>  09-10    MICROBIOLOGY:    9/6 Perit CX NGTD  9/4 BCX x2 NGTD    RADIOLOGY:    9/8 CT A/P:    IMPRESSION:     The hematoma posterior inferior to the stomach is mildly increased in   size with increased liquefaction. Hemoperitoneum in the pelvis is   improved.

## 2017-09-11 NOTE — PROGRESS NOTE ADULT - PROBLEM SELECTOR PLAN 5
monitor calcium and phos.  calcium is low, likely due to low albumin.  No intervention at this time.

## 2017-09-11 NOTE — PROGRESS NOTE ADULT - PROBLEM SELECTOR PLAN 7
Continue to monitor hemoglobin  - Continue to hold hydroxyurea/folate - readdress regarding restarting Continue to monitor hemoglobin  - Continue to hold hydroxyurea/folate per renal as pt with anemia - readdress regarding restarting  - monitor wbc increasing slightly

## 2017-09-11 NOTE — PROGRESS NOTE ADULT - PROBLEM SELECTOR PLAN 1
Likely 2/2 hematomas and underlying abdominal irritation. Stable on exam. Had repeat CT with mildly enlarged hematoma, but overall improvement of hemoperitoneum  - No surgical intervention per surgery. Spoke with surg regarding CTAP result.  - Continue Q12 CBCs  - Continue to follow-up ID recommendations - OFF cefepine  - Dilaudid prn severe pain, oxy prn mod pain.  - Benadryl for abdominal itchiness PRN  -  c/w bowel regimen. Adding lactulose to bowel regimen. Avoid enemas given history of ESRD (concern for phosphorous absorption). Improved, likely 2/2 hematomas and underlying abdominal irritation. Had repeat CT with mildly enlarged hematoma, but overall improvement of hemoperitoneum  - No surgical intervention per surgery.   - Hgb stable, c/w daily CBC  - Continue to follow-up ID recommendations - OFF cefepine  - Dilaudid PRN severe pain, oxy PRN mod pain.  - Benadryl for abdominal itchiness PRN  - c/w bowel regimen. Adding lactulose to bowel regimen. Avoid enemas given history of ESRD (concern for phosphorous absorption). Improved, likely 2/2 hematomas and underlying abdominal irritation. Had repeat CT with mildly enlarged hematoma, but overall improvement of hemoperitoneum  - No surgical intervention per surgery. Per renal - ongoing discussions regarding PD cath removal and will d/w surgery.  - Hgb stable, c/w daily CBC  - Continue to follow-up ID recommendations - OFF cefepine  - Decr pain regimen as now improved - d/c Dilaudid. C/W oxy PRN.  - Benadryl for abdominal itchiness PRN  - c/w bowel regimen. Adding lactulose to bowel regimen. Avoid enemas given history of ESRD (concern for phosphorous absorption).

## 2017-09-11 NOTE — PROGRESS NOTE ADULT - PROBLEM SELECTOR PLAN 1
Discussed with surgical resident who defers decision of stopping PD to nephrology.  Will discuss with attending physician who will discuss with Dr. Anaya regarding removing PD catheter and transitioning to HD.

## 2017-09-11 NOTE — PROGRESS NOTE ADULT - ATTENDING COMMENTS
56 yo female with recent new start PD ([facility]), chronic GN, polycythemia vera was at home on Sat completed PD and drained without any difficulty  6 hours later pt reported sharp pain while at home felt weak and then she syncopized and was brought to [facility]  The catheter was manipulated at that time and was noted to have hemoperitoneum and CT confirmed that  Given abx and transfused 1 unit    ESRD:   Currently with stable electrolytes  No urgent indication for dialysis   maintain on sodium bicarbonate    Hemoperitoneum: reviewed in detail with the radiologist. 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 Its likely that one of the smaller vessels ruptured causing the hemoperitoneum. Given the above finding, she is no longer a  peritoneal dialysis candidate.  Discussed with Dr Anaya who is agreeable to removing dialysis catheter.   For vein mapping and AVF  placement  Monitoring off antibiotics     Anemia:  epogen weekly ( received 10.000 units 9/8)

## 2017-09-11 NOTE — PROGRESS NOTE ADULT - PROBLEM SELECTOR PLAN 3
Holding PD currently. No urgent need for dialysis currently.   - Planning to call vascular surgery for HD preparation  - ordered vein mapping  - Continue to follow nephrology recommendations Holding PD currently. No urgent need for dialysis currently.   - Per Renal, patient no longer a candidate for PD, will need AVF planning  - Vein mapping study ordered  - will consult Vascular Surgery  - Continue to follow nephrology recommendations

## 2017-09-11 NOTE — PROGRESS NOTE ADULT - PROBLEM SELECTOR PLAN 5
Continue to monitor CBCs  - Transfuse for hg <7.0 or if symptomatic   - Continue orthostatic blood pressures

## 2017-09-11 NOTE — PROGRESS NOTE ADULT - ASSESSMENT
57y year old Female who presents with hemoperitoneum, patient had ESRD with peritoneal dialysis catheter placed on August, presented to OSH with bleeding in abdomen, transferred to [facility] for further management, now with stable H/H.     Repeat CT done Saturday shows epigastric hematoma mildly increased in size measuring 9.1 x  7.3 cm. Pelvic hematoma resolving with a small amount of residual blood in the pelvis.    CT findings consistent with hematoma being reabsorbed. As HCT stable over last week, no indications that pt is bleeding actively. Since PD lavages have been successful, no reason to d/c PD cath. Pt will have some degree of pain as long as hematoma present secondary to peritoneal irritation.     - CBC stable, continue to monitor  - No surgical intervention at this time  - No recommendations to remove PD cath  - D/w Dr. Héctor Zayas PGY 2  [phone]

## 2017-09-11 NOTE — PROGRESS NOTE ADULT - PROBLEM SELECTOR PLAN 3
Honitor h/h.  Will give intermittent epogen subQ injections.  Would continue to hold hydroxyurea given acute anemia.

## 2017-09-11 NOTE — PROGRESS NOTE ADULT - PROBLEM SELECTOR PLAN 2
Possibly 2/2 traction of PD catheter with underlying varices  - CT abdomen/pelvis with hemoperitoneum, multiple hematomas and varices, incr in size of epigastric hematoma - surg reconsulted  - Check CBC daily  - Avoiding further lavages Possibly 2/2 traction of PD catheter with underlying varices vs. ruptures smaller collateral vessel during PD due to extensive collaterilization of venous mesenteric system seen on CT A/P per Renal.   - hgb stable, daily CBC  - Avoiding further lavages

## 2017-09-11 NOTE — PROGRESS NOTE ADULT - PROBLEM SELECTOR PLAN 8
SCDs given hemoperitoneum    Kala Mera MD  Internal Medicine, Intern  Pager [phone] SCDs given hemoperitoneum    Sara Zurita MD  Internal Medicine, Intern  Pager [phone] SCDs given hemoperitoneum. Encourage ambulation    DISPO: D/w social work- pt with emergency medicaid     Sara Zurita MD  Internal Medicine, Intern  Pager [phone] SCDs given hemoperitoneum. Encourage ambulation    DISPO: D/w social work- pt with emergency medicaid     Sara Zurita MD

## 2017-09-11 NOTE — PROGRESS NOTE ADULT - SUBJECTIVE AND OBJECTIVE BOX
Green Surgery    No overnight events. Pain controlled this morning. No nausea or emesis overnight. Feels like she has gas pains. Ambulating and voiding.     PE  Gen: NAD  Abd: deferred    Vital Signs Last 24 Hrs  T(C): 37.3 (09-11-17 @ 05:33), Max: 37.3 (09-11-17 @ 05:33)  T(F): 99.1 (09-11-17 @ 05:33), Max: 99.1 (09-11-17 @ 05:33)  HR: 89 (09-11-17 @ 05:33) (89 - 91)  BP: 129/78 (09-11-17 @ 05:33) (113/73 - 129/78)  BP(mean): --  RR: 16 (09-11-17 @ 05:33) (16 - 18)  SpO2: 96% (09-11-17 @ 05:33) (95% - 99%)  I&O's Detail    10 Sep 2017 07:01  -  11 Sep 2017 07:00  --------------------------------------------------------  IN:    Oral Fluid: 1900 mL  Total IN: 1900 mL    OUT:  Total OUT: 0 mL    Total NET: 1900 mL                              7.8    14.67 )-----------( 438      ( 10 Sep 2017 08:13 )             26.3     09-10    136  |  104  |  33<H>  ----------------------------<  92  5.0   |  19<L>  |  4.22<H>    Ca    7.8<L>      10 Sep 2017 08:24  Phos  4.3     09-10  Mg     1.9     09-10    TPro  5.9<L>  /  Alb  2.5<L>  /  TBili  1.1  /  DBili  x   /  AST  14  /  ALT  6<L>  /  AlkPhos  131<H>  09-10      CAPILLARY BLOOD GLUCOSE    MEDICATIONS  (STANDING):  amLODIPine   Tablet 10 milliGRAM(s) Oral daily  senna 2 Tablet(s) Oral at bedtime  docusate sodium 100 milliGRAM(s) Oral two times a day  sodium bicarbonate 650 milliGRAM(s) Oral four times a day  polyethylene glycol 3350 17 Gram(s) Oral daily  simethicone 80 milliGRAM(s) Chew two times a day  lactulose Syrup 5 Gram(s) Oral daily    MEDICATIONS  (PRN):  acetaminophen   Tablet. 650 milliGRAM(s) Oral every 6 hours PRN headache  HYDROmorphone  Injectable 0.5 milliGRAM(s) IV Push every 4 hours PRN Severe Pain (7 - 10)  oxyCODONE    IR 2.5 milliGRAM(s) Oral daily PRN Moderate Pain (4 - 6)  diphenhydrAMINE   Capsule 25 milliGRAM(s) Oral two times a day PRN Rash and/or Itching

## 2017-09-11 NOTE — PROGRESS NOTE ADULT - PROBLEM SELECTOR PLAN 2
No acute HD indications, eGFR of 11 can provide sufficient short term clearance if close renal diet is followed.

## 2017-09-11 NOTE — PROGRESS NOTE ADULT - ASSESSMENT
57 F w/ PMH of HTN, polycythemia vera, ESRD on PD (2/2 chronic GN) p/w hemoperitoneum and symptomatic hemorrhagic anemia, now stable. 57 F w/ PMH of HTN, polycythemia vera, ESRD on PD (2/2 chronic GN) p/w hemoperitoneum and symptomatic hemorrhagic anemia likely 2/2 variceal bleed s/p lavage now pending HD setup.

## 2017-09-12 ENCOUNTER — APPOINTMENT (OUTPATIENT)
Dept: NEPHROLOGY | Facility: CLINIC | Age: 57
End: 2017-09-12

## 2017-09-12 LAB
ALBUMIN SERPL ELPH-MCNC: 2.6 G/DL — LOW (ref 3.3–5)
ALP SERPL-CCNC: 121 U/L — HIGH (ref 40–120)
ALT FLD-CCNC: <4 U/L — LOW (ref 10–45)
ANION GAP SERPL CALC-SCNC: 15 MMOL/L — SIGNIFICANT CHANGE UP (ref 5–17)
APTT BLD: 33.7 SEC — SIGNIFICANT CHANGE UP (ref 27.5–37.4)
AST SERPL-CCNC: 17 U/L — SIGNIFICANT CHANGE UP (ref 10–40)
BASOPHILS # BLD AUTO: 0.15 K/UL — SIGNIFICANT CHANGE UP (ref 0–0.2)
BASOPHILS NFR BLD AUTO: 0.9 % — SIGNIFICANT CHANGE UP (ref 0–2)
BILIRUB SERPL-MCNC: 1.1 MG/DL — SIGNIFICANT CHANGE UP (ref 0.2–1.2)
BLD GP AB SCN SERPL QL: NEGATIVE — SIGNIFICANT CHANGE UP
BUN SERPL-MCNC: 32 MG/DL — HIGH (ref 7–23)
CALCIUM SERPL-MCNC: 7.8 MG/DL — LOW (ref 8.4–10.5)
CHLORIDE SERPL-SCNC: 105 MMOL/L — SIGNIFICANT CHANGE UP (ref 96–108)
CO2 SERPL-SCNC: 17 MMOL/L — LOW (ref 22–31)
CREAT SERPL-MCNC: 4.2 MG/DL — HIGH (ref 0.5–1.3)
EOSINOPHIL # BLD AUTO: 0.43 K/UL — SIGNIFICANT CHANGE UP (ref 0–0.5)
EOSINOPHIL NFR BLD AUTO: 2.6 % — SIGNIFICANT CHANGE UP (ref 0–6)
GLUCOSE SERPL-MCNC: 90 MG/DL — SIGNIFICANT CHANGE UP (ref 70–99)
HCT VFR BLD CALC: 26.6 % — LOW (ref 34.5–45)
HGB BLD-MCNC: 7.9 G/DL — LOW (ref 11.5–15.5)
INR BLD: 1.16 RATIO — SIGNIFICANT CHANGE UP (ref 0.88–1.16)
LYMPHOCYTES # BLD AUTO: 1.44 K/UL — SIGNIFICANT CHANGE UP (ref 1–3.3)
LYMPHOCYTES # BLD AUTO: 8.8 % — LOW (ref 13–44)
MAGNESIUM SERPL-MCNC: 1.7 MG/DL — SIGNIFICANT CHANGE UP (ref 1.6–2.6)
MCHC RBC-ENTMCNC: 22 PG — LOW (ref 27–34)
MCHC RBC-ENTMCNC: 29.7 GM/DL — LOW (ref 32–36)
MCV RBC AUTO: 74.1 FL — LOW (ref 80–100)
MONOCYTES # BLD AUTO: 0.29 K/UL — SIGNIFICANT CHANGE UP (ref 0–0.9)
MONOCYTES NFR BLD AUTO: 1.8 % — LOW (ref 2–14)
NEUTROPHILS # BLD AUTO: 13.77 K/UL — HIGH (ref 1.8–7.4)
NEUTROPHILS NFR BLD AUTO: 84.2 % — HIGH (ref 43–77)
PHOSPHATE SERPL-MCNC: 4.3 MG/DL — SIGNIFICANT CHANGE UP (ref 2.5–4.5)
PLATELET # BLD AUTO: 456 K/UL — HIGH (ref 150–400)
POTASSIUM SERPL-MCNC: 5.3 MMOL/L — SIGNIFICANT CHANGE UP (ref 3.5–5.3)
POTASSIUM SERPL-SCNC: 5.3 MMOL/L — SIGNIFICANT CHANGE UP (ref 3.5–5.3)
PROT SERPL-MCNC: 6.5 G/DL — SIGNIFICANT CHANGE UP (ref 6–8.3)
PROTHROM AB SERPL-ACNC: 13.2 SEC — HIGH (ref 10–13.1)
RBC # BLD: 3.59 M/UL — LOW (ref 3.8–5.2)
RBC # FLD: 21.3 % — HIGH (ref 10.3–14.5)
RH IG SCN BLD-IMP: POSITIVE — SIGNIFICANT CHANGE UP
SODIUM SERPL-SCNC: 137 MMOL/L — SIGNIFICANT CHANGE UP (ref 135–145)
WBC # BLD: 14.71 K/UL — HIGH (ref 3.8–10.5)
WBC # FLD AUTO: 14.71 K/UL — HIGH (ref 3.8–10.5)

## 2017-09-12 PROCEDURE — 99232 SBSQ HOSP IP/OBS MODERATE 35: CPT

## 2017-09-12 PROCEDURE — 99233 SBSQ HOSP IP/OBS HIGH 50: CPT | Mod: GC

## 2017-09-12 RX ADMIN — Medication 650 MILLIGRAM(S): at 12:18

## 2017-09-12 RX ADMIN — SIMETHICONE 80 MILLIGRAM(S): 80 TABLET, CHEWABLE ORAL at 06:27

## 2017-09-12 RX ADMIN — AMLODIPINE BESYLATE 10 MILLIGRAM(S): 2.5 TABLET ORAL at 06:25

## 2017-09-12 RX ADMIN — Medication 650 MILLIGRAM(S): at 06:27

## 2017-09-12 RX ADMIN — OXYCODONE HYDROCHLORIDE 2.5 MILLIGRAM(S): 5 TABLET ORAL at 00:50

## 2017-09-12 RX ADMIN — Medication 650 MILLIGRAM(S): at 00:23

## 2017-09-12 RX ADMIN — OXYCODONE HYDROCHLORIDE 2.5 MILLIGRAM(S): 5 TABLET ORAL at 00:20

## 2017-09-12 RX ADMIN — Medication 650 MILLIGRAM(S): at 23:01

## 2017-09-12 RX ADMIN — LACTULOSE 5 GRAM(S): 10 SOLUTION ORAL at 12:18

## 2017-09-12 RX ADMIN — Medication 100 MILLIGRAM(S): at 17:03

## 2017-09-12 RX ADMIN — Medication 650 MILLIGRAM(S): at 23:02

## 2017-09-12 RX ADMIN — Medication 100 MILLIGRAM(S): at 06:27

## 2017-09-12 RX ADMIN — Medication 650 MILLIGRAM(S): at 23:15

## 2017-09-12 RX ADMIN — Medication 650 MILLIGRAM(S): at 17:04

## 2017-09-12 NOTE — PROGRESS NOTE ADULT - SUBJECTIVE AND OBJECTIVE BOX
CC: F/U for ? Peritonitis    Saw/spoke to patient. No fevers, no chills. Still with mild abd pain. No new complaints.    Allergies  No Known Allergies    ANTIMICROBIALS:  Off    PE:    Vital Signs Last 24 Hrs  T(C): 36.9 (12 Sep 2017 14:13), Max: 36.9 (12 Sep 2017 04:55)  T(F): 98.5 (12 Sep 2017 14:13), Max: 98.5 (12 Sep 2017 14:13)  HR: 96 (12 Sep 2017 14:13) (83 - 96)  BP: 98/66 (12 Sep 2017 14:13) (98/66 - 122/82)  BP(mean): --  RR: 20 (12 Sep 2017 14:13) (18 - 20)  SpO2: 94% (12 Sep 2017 14:13) (94% - 99%)    Gen: AOx3, NAD, non-toxic, pleasant  CV: S1+S2 normal, no murmurs, nontachycardic  Resp: Clear bilat, no resp distress, no crackles/wheezes  Abd: Soft, mild tender, PD catheter in place  Ext: No LE edema, no wounds      LABS:                        7.9    14.71 )-----------( 456      ( 12 Sep 2017 07:30 )             26.6     09-12    137  |  105  |  32<H>  ----------------------------<  90  5.3   |  17<L>  |  4.20<H>    Ca    7.8<L>      12 Sep 2017 07:42  Phos  4.3     09-12  Mg     1.7     09-12    TPro  6.5  /  Alb  2.6<L>  /  TBili  1.1  /  DBili  x   /  AST  17  /  ALT  <4<L>  /  AlkPhos  121<H>  09-12    MICROBIOLOGY:    9/6 Perit CX NGTD    (otherwise reviewed)    RADIOLOGY:    No new available

## 2017-09-12 NOTE — DIETITIAN INITIAL EVALUATION ADULT. - PROBLEM SELECTOR PLAN 2
1) Blood Culture x 2, Urinalysis, f/u peritoneal culture  2) Cefepime for broad spectrum coverage  3) Trend WBC count as patient admitted with leukocytosis

## 2017-09-12 NOTE — PROGRESS NOTE ADULT - ASSESSMENT
57 F w/ PMH of HTN, polycythemia vera, ESRD on PD (2/2 chronic GN) p/w hemoperitoneum and symptomatic hemorrhagic anemia likely 2/2 variceal bleed s/p lavage now pending HD setup.

## 2017-09-12 NOTE — PROGRESS NOTE ADULT - PROBLEM SELECTOR PLAN 1
Improved, likely 2/2 hematomas and underlying abdominal irritation. Had repeat CT with mildly enlarged hematoma, but overall improvement of hemoperitoneum  - No surgical intervention per surgery. Per surgical note PD removal as outpatient  - Hgb stable, c/w daily CBC  - Continue to follow-up ID recommendations - OFF cefepine  - Decr pain regimen as now improved - d/c Dilaudid. C/W oxy PRN.  - Benadryl for abdominal itchiness PRN  - c/w bowel regimen. Adding lactulose to bowel regimen. Avoid enemas given history of ESRD (concern for phosphorous absorption). Improved, likely 2/2 hematomas and underlying abdominal irritation. Had repeat CT with mildly enlarged hematoma, but overall improvement of hemoperitoneum  - No surgical intervention per surgery. Per surgical note PD removal as outpatient  - Touch base with surgery given lack of insurance for possible PD removal inpatient  - Hgb stable, c/w daily CBC  - Continue to follow-up ID recommendations - OFF cefepine  - Decr pain regimen as now improved - d/c Dilaudid. C/W oxy PRN.  - Benadryl for abdominal itchiness PRN  - c/w bowel regimen. Adding lactulose to bowel regimen. Avoid enemas given history of ESRD (concern for phosphorous absorption). Improved, likely 2/2 hematomas and underlying abdominal irritation. Had repeat CT with mildly enlarged hematoma, but overall improvement of hemoperitoneum  - No surgical intervention per surgery. Per surgical note PD removal as outpatient  - Touch base with surgery given lack of insurance for possible PD removal inpatient or if cannot be done, per renal - pt can follow w PD clinic until insurance  - Hgb stable, c/w daily CBC  - Continue to follow-up ID recommendations - OFF cefepine  - C/W oxy PRN.  - Benadryl for abdominal itching PRN  - c/w bowel regimen. Adding lactulose to bowel regimen. Avoid enemas given history of ESRD (concern for phosphorous absorption).

## 2017-09-12 NOTE — PROGRESS NOTE ADULT - PROBLEM SELECTOR PLAN 8
SCDs given hemoperitoneum. Encourage ambulation    DISPO: D/w social work- pt with emergency medicaid     Kala Mera MD  Internal Medicine, Intern  Pager [phone]

## 2017-09-12 NOTE — PROGRESS NOTE ADULT - ASSESSMENT
57y year old Female who presents with hemoperitoneum, patient had ESRD with peritoneal dialysis catheter placed on August, presented to OSH with bleeding in abdomen, transferred to [facility] for further management, now with stable H/H.     - CBC stable, continue to monitor  - No surgical intervention at this time  - Conversation with Dr. Anaya and Nephrology yesterday, pt will follow up outpatient to remove PD cath  - Will sign off, reconsult if necessary  - D/w Dr. Héctor Zayas PGY 2  [id number]

## 2017-09-12 NOTE — PROGRESS NOTE ADULT - PROBLEM SELECTOR PLAN 3
Holding PD currently. No urgent need for dialysis currently.   - Per Renal, patient no longer a candidate for PD, will need AVF planning  - Vein mapping study ordered  - will consult Vascular Surgery  - Continue to follow nephrology recommendations Holding PD currently. No urgent need for dialysis currently.   - Per Renal, patient no longer a candidate for PD, will need AVF planning  - Vein mapping study ordered  - Vascular Surgery consulted. will f/u  - Continue to follow nephrology recommendations

## 2017-09-12 NOTE — PROGRESS NOTE ADULT - SUBJECTIVE AND OBJECTIVE BOX
Green Surgery    No overnight events. Pain controlled this morning. No nausea or emesis overnight. Complaining of reflux. Having bowel function. Ambulating and voiding. No current complaints     PE  Gen: NAD  Abd: soft, minimally tender, nondistended, PD cath site intact    Vital Signs Last 24 Hrs  T(C): 36.9 (09-12-17 @ 04:55), Max: 37.1 (09-11-17 @ 14:17)  T(F): 98.4 (09-12-17 @ 04:55), Max: 98.7 (09-11-17 @ 14:17)  HR: 89 (09-12-17 @ 04:55) (83 - 97)  BP: 112/71 (09-12-17 @ 04:55) (112/71 - 122/82)  BP(mean): --  RR: 18 (09-11-17 @ 21:00) (18 - 18)  SpO2: 99% (09-11-17 @ 21:00) (99% - 99%)  I&O's Detail    10 Sep 2017 07:01  -  11 Sep 2017 07:00  --------------------------------------------------------  IN:    Oral Fluid: 1900 mL  Total IN: 1900 mL    OUT:  Total OUT: 0 mL    Total NET: 1900 mL      11 Sep 2017 07:01  -  12 Sep 2017 06:18  --------------------------------------------------------  IN:    Oral Fluid: 1560 mL  Total IN: 1560 mL    OUT:    Voided: 450 mL  Total OUT: 450 mL    Total NET: 1110 mL                              8.3    16.35 )-----------( 458      ( 11 Sep 2017 08:41 )             27.7     09-11    136  |  102  |  37<H>  ----------------------------<  82  5.2   |  18<L>  |  4.23<H>    Ca    7.9<L>      11 Sep 2017 08:48  Phos  4.0     09-11  Mg     1.9     09-11    TPro  5.9<L>  /  Alb  2.5<L>  /  TBili  1.1  /  DBili  x   /  AST  14  /  ALT  6<L>  /  AlkPhos  131<H>  09-10      CAPILLARY BLOOD GLUCOSE          MEDICATIONS  (STANDING):  amLODIPine   Tablet 10 milliGRAM(s) Oral daily  senna 2 Tablet(s) Oral at bedtime  docusate sodium 100 milliGRAM(s) Oral two times a day  sodium bicarbonate 650 milliGRAM(s) Oral four times a day  polyethylene glycol 3350 17 Gram(s) Oral daily  simethicone 80 milliGRAM(s) Chew two times a day  lactulose Syrup 5 Gram(s) Oral daily    MEDICATIONS  (PRN):  acetaminophen   Tablet. 650 milliGRAM(s) Oral every 6 hours PRN headache  diphenhydrAMINE   Capsule 25 milliGRAM(s) Oral two times a day PRN Rash and/or Itching

## 2017-09-12 NOTE — PROGRESS NOTE ADULT - SUBJECTIVE AND OBJECTIVE BOX
Patient is a 57y old  Female who presents with a chief complaint of Peritonitis, Hemoperitoneum (03 Sep 2017 20:09)      SUBJECTIVE / OVERNIGHT EVENTS:  No overnight events.     MEDICATIONS  (STANDING):  amLODIPine   Tablet 10 milliGRAM(s) Oral daily  senna 2 Tablet(s) Oral at bedtime  docusate sodium 100 milliGRAM(s) Oral two times a day  sodium bicarbonate 650 milliGRAM(s) Oral four times a day  polyethylene glycol 3350 17 Gram(s) Oral daily  simethicone 80 milliGRAM(s) Chew two times a day  lactulose Syrup 5 Gram(s) Oral daily    MEDICATIONS  (PRN):  acetaminophen   Tablet. 650 milliGRAM(s) Oral every 6 hours PRN headache  diphenhydrAMINE   Capsule 25 milliGRAM(s) Oral two times a day PRN Rash and/or Itching      Vital Signs Last 24 Hrs  T(C): 36.9 (12 Sep 2017 04:55), Max: 37.1 (11 Sep 2017 14:17)  T(F): 98.4 (12 Sep 2017 04:55), Max: 98.7 (11 Sep 2017 14:17)  HR: 89 (12 Sep 2017 04:55) (83 - 97)  BP: 112/71 (12 Sep 2017 04:55) (112/71 - 122/82)  BP(mean): --  RR: 18 (11 Sep 2017 21:00) (18 - 18)  SpO2: 99% (11 Sep 2017 21:00) (99% - 99%)      I&O's Summary    11 Sep 2017 07:01  -  12 Sep 2017 07:00  --------------------------------------------------------  IN: 1560 mL / OUT: 450 mL / NET: 1110 mL      PHYSICAL EXAM:  GENERAL: NAD, well-developed  HEAD:  Atraumatic, Normocephalic  CHEST/LUNG: Clear to auscultation bilaterally; No wheezes, rhonchi or gallops  HEART: Regular rate and rhythm; No murmurs, rubs, or gallops  ABDOMEN: Soft, Nontender, Nondistended; Bowel sounds present  EXTREMITIES:  2+ Peripheral Pulses, No clubbing, cyanosis, or edema  PSYCH: Appropriate  NEUROLOGY: non-focal  SKIN: No rashes or lesions    LABS:                        8.3    16.35 )-----------( 458      ( 11 Sep 2017 08:41 )             27.7     09-11    136  |  102  |  37<H>  ----------------------------<  82  5.2   |  18<L>  |  4.23<H>    Ca    7.9<L>      11 Sep 2017 08:48  Phos  4.0     09-11  Mg     1.9     09-11    TPro  5.9<L>  /  Alb  2.5<L>  /  TBili  1.1  /  DBili  x   /  AST  14  /  ALT  6<L>  /  AlkPhos  131<H>  09-10 Patient is a 57y old  Female who presents with a chief complaint of Peritonitis, Hemoperitoneum (03 Sep 2017 20:09)      SUBJECTIVE / OVERNIGHT EVENTS:  No overnight events. Concerned about PD cath removal as outpatient as does not have insurance. Improved pain. Has been moving bowels. Has been ambulating. No other complaints.     MEDICATIONS  (STANDING):  amLODIPine   Tablet 10 milliGRAM(s) Oral daily  senna 2 Tablet(s) Oral at bedtime  docusate sodium 100 milliGRAM(s) Oral two times a day  sodium bicarbonate 650 milliGRAM(s) Oral four times a day  polyethylene glycol 3350 17 Gram(s) Oral daily  simethicone 80 milliGRAM(s) Chew two times a day  lactulose Syrup 5 Gram(s) Oral daily    MEDICATIONS  (PRN):  acetaminophen   Tablet. 650 milliGRAM(s) Oral every 6 hours PRN headache  diphenhydrAMINE   Capsule 25 milliGRAM(s) Oral two times a day PRN Rash and/or Itching      Vital Signs Last 24 Hrs  T(C): 36.9 (12 Sep 2017 04:55), Max: 37.1 (11 Sep 2017 14:17)  T(F): 98.4 (12 Sep 2017 04:55), Max: 98.7 (11 Sep 2017 14:17)  HR: 89 (12 Sep 2017 04:55) (83 - 97)  BP: 112/71 (12 Sep 2017 04:55) (112/71 - 122/82)  BP(mean): --  RR: 18 (11 Sep 2017 21:00) (18 - 18)  SpO2: 99% (11 Sep 2017 21:00) (99% - 99%)      I&O's Summary    11 Sep 2017 07:01  -  12 Sep 2017 07:00  --------------------------------------------------------  IN: 1560 mL / OUT: 450 mL / NET: 1110 mL      PHYSICAL EXAM:  GENERAL: NAD, well-developed  HEAD:  Atraumatic, Normocephalic  CHEST/LUNG: Clear to auscultation bilaterally; No wheezes, rhonchi   HEART: Regular rate and rhythm; No murmurs, rubs, or gallops  ABDOMEN: Tender, with guarding on right, otherwise left is soft and tender  EXTREMITIES:  No edema  PSYCH: Appropriate  NEUROLOGY: non-focal  SKIN: No rashes or lesions    LABS:                        8.3    16.35 )-----------( 458      ( 11 Sep 2017 08:41 )             27.7     09-11    136  |  102  |  37<H>  ----------------------------<  82  5.2   |  18<L>  |  4.23<H>    Ca    7.9<L>      11 Sep 2017 08:48  Phos  4.0     09-11  Mg     1.9     09-11    TPro  5.9<L>  /  Alb  2.5<L>  /  TBili  1.1  /  DBili  x   /  AST  14  /  ALT  6<L>  /  AlkPhos  131<H>  09-10 Patient is a 57y old  Female who presents with a chief complaint of Peritonitis, Hemoperitoneum (03 Sep 2017 20:09)      SUBJECTIVE / OVERNIGHT EVENTS:  No overnight events. Concerned about PD cath removal as outpatient as does not have insurance. Improved pain. Has been moving bowels. Has been ambulating. No other complaints.     MEDICATIONS  (STANDING):  amLODIPine   Tablet 10 milliGRAM(s) Oral daily  senna 2 Tablet(s) Oral at bedtime  docusate sodium 100 milliGRAM(s) Oral two times a day  sodium bicarbonate 650 milliGRAM(s) Oral four times a day  polyethylene glycol 3350 17 Gram(s) Oral daily  simethicone 80 milliGRAM(s) Chew two times a day  lactulose Syrup 5 Gram(s) Oral daily    MEDICATIONS  (PRN):  acetaminophen   Tablet. 650 milliGRAM(s) Oral every 6 hours PRN headache  diphenhydrAMINE   Capsule 25 milliGRAM(s) Oral two times a day PRN Rash and/or Itching      Vital Signs Last 24 Hrs  T(C): 36.9 (12 Sep 2017 04:55), Max: 37.1 (11 Sep 2017 14:17)  T(F): 98.4 (12 Sep 2017 04:55), Max: 98.7 (11 Sep 2017 14:17)  HR: 89 (12 Sep 2017 04:55) (83 - 97)  BP: 112/71 (12 Sep 2017 04:55) (112/71 - 122/82)  BP(mean): --  RR: 18 (11 Sep 2017 21:00) (18 - 18)  SpO2: 99% (11 Sep 2017 21:00) (99% - 99%)      I&O's Summary    11 Sep 2017 07:01  -  12 Sep 2017 07:00  --------------------------------------------------------  IN: 1560 mL / OUT: 450 mL / NET: 1110 mL      PHYSICAL EXAM:  GENERAL: NAD, well-developed  HEAD:  Atraumatic, Normocephalic  CHEST/LUNG: Clear to auscultation bilaterally; No wheezes, rhonchi   HEART: Regular rate and rhythm; No murmurs, rubs, or gallops  ABDOMEN: soft, minimal TTP of LLQ, no rebound/guarding  EXTREMITIES:  No edema  PSYCH: Appropriate  NEUROLOGY: non-focal  SKIN: No rashes or lesions    LABS:                         7.9    14.71 )-----------( 456      ( 12 Sep 2017 07:30 )             26.6     09-12    137  |  105  |  32<H>  ----------------------------<  90  5.3   |  17<L>  |  4.20<H>    Ca    7.8<L>      12 Sep 2017 07:42  Phos  4.3     09-12  Mg     1.7     09-12    TPro  6.5  /  Alb  2.6<L>  /  TBili  1.1  /  DBili  x   /  AST  17  /  ALT  <4<L>  /  AlkPhos  121<H>  09-12    PT/INR - ( 12 Sep 2017 07:30 )   PT: 13.2 sec;   INR: 1.16 ratio         PTT - ( 12 Sep 2017 07:30 )  PTT:33.7 sec    Labs reviewed, stable.

## 2017-09-12 NOTE — PROGRESS NOTE ADULT - SUBJECTIVE AND OBJECTIVE BOX
[facility] DIVISION OF KIDNEY DISEASES AND HYPERTENSION -- FOLLOW UP NOTE  --------------------------------------------------------------------------------  Chief Complaint:  ESRD on PD    24 hour events/subjective:  patient reports that abdominal pain has improved.  she had a small bowel movement yesterday and still has poor appetite.  Family has brought her ramtj from home.        PAST HISTORY  --------------------------------------------------------------------------------  No significant changes to PMH, PSH, FHx, SHx, unless otherwise noted    ALLERGIES & MEDICATIONS  --------------------------------------------------------------------------------  Allergies    No Known Allergies    Intolerances      Standing Inpatient Medications  amLODIPine   Tablet 10 milliGRAM(s) Oral daily  senna 2 Tablet(s) Oral at bedtime  docusate sodium 100 milliGRAM(s) Oral two times a day  sodium bicarbonate 650 milliGRAM(s) Oral four times a day  polyethylene glycol 3350 17 Gram(s) Oral daily  simethicone 80 milliGRAM(s) Chew two times a day  lactulose Syrup 5 Gram(s) Oral daily    PRN Inpatient Medications  acetaminophen   Tablet. 650 milliGRAM(s) Oral every 6 hours PRN  diphenhydrAMINE   Capsule 25 milliGRAM(s) Oral two times a day PRN      REVIEW OF SYSTEMS  --------------------------------------------------------------------------------  Gen: No fevers/chills  Skin: No rashes  Head/Eyes/Ears/Mouth: No headache  Respiratory: No dyspnea  CV: No chest pain  GI: +abdominal pain improved  : No dysuria  MSK: No edema  Neuro: No dizziness/lightheadedness    VITALS/PHYSICAL EXAM  --------------------------------------------------------------------------------  T(C): 36.9 (09-12-17 @ 04:55), Max: 37.1 (09-11-17 @ 14:17)  HR: 89 (09-12-17 @ 04:55) (83 - 97)  BP: 112/71 (09-12-17 @ 04:55) (112/71 - 122/82)  RR: 18 (09-11-17 @ 21:00) (18 - 18)  SpO2: 99% (09-11-17 @ 21:00) (99% - 99%)  Wt(kg): --    Weight (kg): 54.5 (09-11-17 @ 10:12)      09-11-17 @ 07:01  -  09-12-17 @ 07:00  --------------------------------------------------------  IN: 1680 mL / OUT: 850 mL / NET: 830 mL      Physical Exam:  	Gen: NAD  	HEENT: MMM  	Pulm: CTA B/L  	CV: RRR, S1S2; no rub  	Abd: +BS, soft, tender to palpation - significantly improved  	: No suprapubic tenderness  	UE:  no edema  	LE:  no edema  	Neuro: No focal deficits  	Psych: Normal affect and mood  	Skin: Warm  	Vascular access:  + abdominal PD catheter    LABS/STUDIES  --------------------------------------------------------------------------------              7.9    14.71 >-----------<  456      [09-12-17 @ 07:30]              26.6     137  |  105  |  32  ----------------------------<  90      [09-12-17 @ 07:42]  5.3   |  17  |  4.20        Ca     7.8     [09-12-17 @ 07:42]      Mg     1.7     [09-12-17 @ 07:42]      Phos  4.3     [09-12-17 @ 07:42]    TPro  6.5  /  Alb  2.6  /  TBili  1.1  /  DBili  x   /  AST  17  /  ALT  <4  /  AlkPhos  121  [09-12-17 @ 07:42]    PT/INR: PT 13.2 , INR 1.16       [09-12-17 @ 07:30]  PTT: 33.7       [09-12-17 @ 07:30]      Creatinine Trend:  SCr 4.20 [09-12 @ 07:42]  SCr 4.23 [09-11 @ 08:48]  SCr 4.22 [09-10 @ 08:24]  SCr 4.34 [09-09 @ 09:12]  SCr 4.44 [09-08 @ 09:04]

## 2017-09-12 NOTE — DIETITIAN INITIAL EVALUATION ADULT. - PROBLEM SELECTOR PLAN 3
1) Hold Peritoneal Dialysis  2) Sodium Bicarbonate IV for chronic hyperkalemia/acidosis while patient is NPO  3) Nephrology consult--patient of Dr. De León--for further interventions/dialysis options  4) Hold diuretics for now in setting of acute bleed  5) Consider topete catheter placement for strict intake and output

## 2017-09-12 NOTE — PROGRESS NOTE ADULT - SUBJECTIVE AND OBJECTIVE BOX
Patient is a 57y old  Female who presents with a chief complaint of Peritonitis, Hemoperitoneum (03 Sep 2017 20:09)      SUBJECTIVE / OVERNIGHT EVENTS: Pt reports pain is improved, but still mild diffuse abd pain. Denies n/v/d, black or bloody stools, CP/SOB, fevers or chills.     MEDICATIONS  (STANDING):  amLODIPine   Tablet 10 milliGRAM(s) Oral daily  senna 2 Tablet(s) Oral at bedtime  docusate sodium 100 milliGRAM(s) Oral two times a day  sodium bicarbonate 650 milliGRAM(s) Oral four times a day  polyethylene glycol 3350 17 Gram(s) Oral daily  simethicone 80 milliGRAM(s) Chew two times a day  lactulose Syrup 5 Gram(s) Oral daily    MEDICATIONS  (PRN):  acetaminophen   Tablet. 650 milliGRAM(s) Oral every 6 hours PRN headache  diphenhydrAMINE   Capsule 25 milliGRAM(s) Oral two times a day PRN Rash and/or Itching      Vital Signs Last 24 Hrs  T(C): 36.9 (12 Sep 2017 04:55), Max: 37.1 (11 Sep 2017 14:17)  T(F): 98.4 (12 Sep 2017 04:55), Max: 98.7 (11 Sep 2017 14:17)  HR: 89 (12 Sep 2017 04:55) (83 - 97)  BP: 112/71 (12 Sep 2017 04:55) (112/71 - 122/82)  BP(mean): --  RR: 18 (11 Sep 2017 21:00) (18 - 18)  SpO2: 99% (11 Sep 2017 21:00) (99% - 99%)  CAPILLARY BLOOD GLUCOSE        I&O's Summary    11 Sep 2017 07:01  -  12 Sep 2017 07:00  --------------------------------------------------------  IN: 1560 mL / OUT: 450 mL / NET: 1110 mL        PHYSICAL EXAM:  GENERAL: NAD, well-developed  HEAD:  Atraumatic, Normocephalic  EYES: EOMI, PERRLA, conjunctiva and sclera clear  NECK: Supple, No JVD  CHEST/LUNG: Clear to auscultation bilaterally; No wheeze  HEART: Regular rate and rhythm; No murmurs, rubs, or gallops  ABDOMEN: Soft, Nontender, Nondistended; Bowel sounds present  EXTREMITIES:  2+ Peripheral Pulses, No clubbing, cyanosis, or edema  PSYCH: AAOx3  NEUROLOGY: non-focal  SKIN: No rashes or lesions    LABS:                        8.3    16.35 )-----------( 458      ( 11 Sep 2017 08:41 )             27.7     09-11    136  |  102  |  37<H>  ----------------------------<  82  5.2   |  18<L>  |  4.23<H>    Ca    7.9<L>      11 Sep 2017 08:48  Phos  4.0     09-11  Mg     1.9     09-11    TPro  5.9<L>  /  Alb  2.5<L>  /  TBili  1.1  /  DBili  x   /  AST  14  /  ALT  6<L>  /  AlkPhos  131<H>  09-10              RADIOLOGY & ADDITIONAL TESTS:    Imaging Personally Reviewed:    Consultant(s) Notes Reviewed:      Care Discussed with Consultants/Other Providers:

## 2017-09-12 NOTE — PROGRESS NOTE ADULT - PROBLEM SELECTOR PLAN 1
Will discontinue PD.  Plan for long term transition to HD.  PD cath intended to be removed as outpatient, but patient reports lack of insurance and inability to pay for procedure as outpatient.  Can ask surgery if procedure can be done as inpatient, or patient can follow-up with PD clinic for catheter maintenance while she awaits health insurance.

## 2017-09-12 NOTE — DIETITIAN INITIAL EVALUATION ADULT. - OTHER INFO
Patient seen for routine LOS assessment.  Patient found sitting in chair.  Complaining of being hungry but becomes full easily and does not care fo hospital foods.  Patient offered rice, noodles, fish, chicken, steamed vegetables.  Would like potatoes but unable to provide due to elevated potassium and not receiving HD treatment due to complications with PD.  Notes that rice is not like her sticky rice and that she prefers boiled chicken, with vegetables and rice in soup  form.  Agreeable to Ensure Clear as supplement.  Weight loss of 16 pounds is a concern.  Appetite and intake decreased.  Family brought in lavell noodles but reviewed label and contains 1700 mg sodium.   Advise dnot to consume.  Attending physician in agreement that patient must comply with renal diet at this time and until HD started.  Basic nutrition education provided for renal.  Patient admits to nausea in A.M due to empty stomach and acids.  MD encouraged patient to eat something.  Suggested some crackers, bread, dry cereal.

## 2017-09-12 NOTE — PROGRESS NOTE ADULT - PROBLEM SELECTOR PLAN 1
Improved, likely 2/2 hematomas and underlying abdominal irritation. Had repeat CT with mildly enlarged hematoma, but overall improvement of hemoperitoneum  - No surgical intervention per surgery. Per surgery discussed with nephro, will  have pt follow-up as outpt for PD cath removal  - Hgb stable, c/w daily CBC  - Continue to follow-up ID recommendations - OFF abx  - Decr pain regimen as now improved - d/c Dilaudid. C/W oxy PRN.  - Benadryl for abdominal itchiness PRN  - c/w bowel regimen. Adding lactulose to bowel regimen. Avoid enemas given history of ESRD (concern for phosphorous absorption)

## 2017-09-12 NOTE — PROGRESS NOTE ADULT - ATTENDING COMMENTS
Per Dr Aguilar's note  Overall stable  Peritoneal catheter needs to be removed inpatient as she has insurance issues and it will not be possible to do this as an outpatient  Needs AVF

## 2017-09-12 NOTE — PROGRESS NOTE ADULT - PROBLEM SELECTOR PLAN 3
Holding PD currently. No urgent need for dialysis currently.   - Per Renal, patient no longer a candidate for PD, will need AVF planning  - Vein mapping study ordered  - will consult Vascular Surgery  - Continue to follow nephrology recommendations.

## 2017-09-12 NOTE — PROGRESS NOTE ADULT - PROBLEM SELECTOR PLAN 7
Continue to monitor hemoglobin  - Continue to hold hydroxyurea/folate per renal as pt with anemia - readdress regarding restarting  - monitor wbc increasing slightly Continue to monitor hemoglobin  - Continue to hold hydroxyurea/folate per renal as pt with anemia - readdress regarding restarting  - monitor wbc as elev

## 2017-09-12 NOTE — PROGRESS NOTE ADULT - PROBLEM SELECTOR PLAN 2
Possibly 2/2 traction of PD catheter with underlying varices vs. ruptures smaller collateral vessel during PD due to extensive collaterilization of venous mesenteric system seen on CT A/P per Renal.   - hgb stable, daily CBC  - Avoiding further lavages.

## 2017-09-12 NOTE — CHART NOTE - NSCHARTNOTEFT_GEN_A_CORE
Upon Nutritional Assessment by the Registered Dietitian your patient was determined to meet criteria / has evidence of the following diagnosis/diagnoses:          [ ]  Mild Protein Calorie Malnutrition        [x ]  Moderate Protein Calorie Malnutrition        [ ] Severe Protein Calorie Malnutrition        [ ] Unspecified Protein Calorie Malnutrition        [ ] Underweight / BMI <19        [ ] Morbid Obesity / BMI > 40      Findings as based on:  [ x] Comprehensive nutrition assessment   [ x] Nutrition Focused Physical Exam  [ x] Other: Weight loss 11% BW, restricted diet with poor oral intake, mild muscle loss with prolonged hospitalization.      Nutrition Plan/Recommendations:  Renal diet, request Ensure Clear 8 ounces x3.  Discussed with Dr. Hahn        PROVIDER Section:     By signing this assessment you are acknowledging and agree with the diagnosis/diagnoses assigned by the Registered Dietitian    Comments:

## 2017-09-12 NOTE — PROGRESS NOTE ADULT - PROBLEM SELECTOR PLAN 2
Request vascular surgery consult for AV fistula placement.  Cont left arm precautions.  Hopefully patient's residual kidney function will be sufficient until fistula can mature.

## 2017-09-12 NOTE — DIETITIAN INITIAL EVALUATION ADULT. - NUTRITION INTERVENTION
Medical Food Supplements/Meals and Snack Medical Food Supplements/Meals and Snack/Nutrition Education

## 2017-09-12 NOTE — PROGRESS NOTE ADULT - PROBLEM SELECTOR PLAN 7
Continue to monitor hemoglobin  - Continue to hold hydroxyurea/folate per renal as pt with anemia - readdress regarding restarting  - monitor wbc increasing slightly.

## 2017-09-12 NOTE — PROGRESS NOTE ADULT - PROBLEM SELECTOR PLAN 2
Possibly 2/2 traction of PD catheter with underlying varices vs. ruptures smaller collateral vessel during PD due to extensive collateralization of venous mesenteric system seen on CT A/P per Renal.   - hgb stable, daily CBC  - Avoiding further lavages

## 2017-09-12 NOTE — DIETITIAN INITIAL EVALUATION ADULT. - NS AS NUTRI INTERV ED CONTENT
Nutrition relationship to health/disease/Reinforced need for low salt low potassium diet, need to change cooking habits. Discussed food availability in hospital./Recommended modifications

## 2017-09-12 NOTE — DIETITIAN INITIAL EVALUATION ADULT. - PROBLEM SELECTOR PLAN 1
1) CBC q4H  2) Transfuse PRBCs to maintain Hgb > 8  3) Surgery consult for possible intervention  4) Serial Abdominal Exams  5) Hold Peritoneal Dialysis  6) Hold Aspirin   7)  Maintain patient NPO for now; glucose q6H

## 2017-09-12 NOTE — PROGRESS NOTE ADULT - ASSESSMENT
56 yo F with ESRD on peritoneal dialysis, PUD, polycythemia vera with episode of abd pain, hemoperitoneum. Patient had high leukocytosis on presentation, had a low grade elevated temp to 100.3F. No overt fevers, no chills, does not appear toxic. OSH reportedly with intraperitoneal leukocytosis in setting of shauna bleeding. Culture from OSH not available. Does not appear to have evidence of bowel perf or other infectious source on CT scan. Overall have low suspicion for infectious peritonitis (suspect leukocytosis, low grade elevated temp, abd pain all 2/2 to hematoma), but difficult to determine as primary diagnostic test (intraperitoneal cell count) wound be confounded by bleeding. Patient remains stable off antibiotics. No fevers, no chills. Still mild abd pain, still leukocytosis, but appears trending down.  - Continue off abx  - F/U peritoneal culture--NGTD  - PD catheter removal per primary team  - Trend WBCs  - Will sign off. Please call with further questions or change in status.      Jose Luis Bui MD  Pager [phone]  After 5pm and on weekends call [phone]

## 2017-09-13 DIAGNOSIS — E87.5 HYPERKALEMIA: ICD-10-CM

## 2017-09-13 LAB
ALBUMIN SERPL ELPH-MCNC: 2.7 G/DL — LOW (ref 3.3–5)
ALP SERPL-CCNC: 121 U/L — HIGH (ref 40–120)
ALT FLD-CCNC: <4 U/L — LOW (ref 10–45)
ANION GAP SERPL CALC-SCNC: 13 MMOL/L — SIGNIFICANT CHANGE UP (ref 5–17)
ANION GAP SERPL CALC-SCNC: 16 MMOL/L — SIGNIFICANT CHANGE UP (ref 5–17)
ANION GAP SERPL CALC-SCNC: 16 MMOL/L — SIGNIFICANT CHANGE UP (ref 5–17)
ANION GAP SERPL CALC-SCNC: 17 MMOL/L — SIGNIFICANT CHANGE UP (ref 5–17)
AST SERPL-CCNC: 15 U/L — SIGNIFICANT CHANGE UP (ref 10–40)
BILIRUB SERPL-MCNC: 1.2 MG/DL — SIGNIFICANT CHANGE UP (ref 0.2–1.2)
BUN SERPL-MCNC: 33 MG/DL — HIGH (ref 7–23)
BUN SERPL-MCNC: 38 MG/DL — HIGH (ref 7–23)
BUN SERPL-MCNC: 38 MG/DL — HIGH (ref 7–23)
BUN SERPL-MCNC: 39 MG/DL — HIGH (ref 7–23)
CALCIUM SERPL-MCNC: 7.8 MG/DL — LOW (ref 8.4–10.5)
CALCIUM SERPL-MCNC: 8 MG/DL — LOW (ref 8.4–10.5)
CALCIUM SERPL-MCNC: 8 MG/DL — LOW (ref 8.4–10.5)
CALCIUM SERPL-MCNC: 8.2 MG/DL — LOW (ref 8.4–10.5)
CHLORIDE SERPL-SCNC: 101 MMOL/L — SIGNIFICANT CHANGE UP (ref 96–108)
CHLORIDE SERPL-SCNC: 102 MMOL/L — SIGNIFICANT CHANGE UP (ref 96–108)
CHLORIDE SERPL-SCNC: 102 MMOL/L — SIGNIFICANT CHANGE UP (ref 96–108)
CHLORIDE SERPL-SCNC: 103 MMOL/L — SIGNIFICANT CHANGE UP (ref 96–108)
CO2 SERPL-SCNC: 19 MMOL/L — LOW (ref 22–31)
CO2 SERPL-SCNC: 20 MMOL/L — LOW (ref 22–31)
CREAT SERPL-MCNC: 4.16 MG/DL — HIGH (ref 0.5–1.3)
CREAT SERPL-MCNC: 4.21 MG/DL — HIGH (ref 0.5–1.3)
CREAT SERPL-MCNC: 4.33 MG/DL — HIGH (ref 0.5–1.3)
CREAT SERPL-MCNC: 4.46 MG/DL — HIGH (ref 0.5–1.3)
GLUCOSE SERPL-MCNC: 118 MG/DL — HIGH (ref 70–99)
GLUCOSE SERPL-MCNC: 158 MG/DL — HIGH (ref 70–99)
GLUCOSE SERPL-MCNC: 166 MG/DL — HIGH (ref 70–99)
GLUCOSE SERPL-MCNC: 98 MG/DL — SIGNIFICANT CHANGE UP (ref 70–99)
HCT VFR BLD CALC: 27.2 % — LOW (ref 34.5–45)
HGB BLD-MCNC: 8.1 G/DL — LOW (ref 11.5–15.5)
MAGNESIUM SERPL-MCNC: 1.9 MG/DL — SIGNIFICANT CHANGE UP (ref 1.6–2.6)
MCHC RBC-ENTMCNC: 22.6 PG — LOW (ref 27–34)
MCHC RBC-ENTMCNC: 29.8 GM/DL — LOW (ref 32–36)
MCV RBC AUTO: 75.8 FL — LOW (ref 80–100)
PHOSPHATE SERPL-MCNC: 4.1 MG/DL — SIGNIFICANT CHANGE UP (ref 2.5–4.5)
PLATELET # BLD AUTO: 411 K/UL — HIGH (ref 150–400)
POTASSIUM SERPL-MCNC: 4.5 MMOL/L — SIGNIFICANT CHANGE UP (ref 3.5–5.3)
POTASSIUM SERPL-MCNC: 5.2 MMOL/L — SIGNIFICANT CHANGE UP (ref 3.5–5.3)
POTASSIUM SERPL-MCNC: 5.5 MMOL/L — HIGH (ref 3.5–5.3)
POTASSIUM SERPL-MCNC: 5.6 MMOL/L — HIGH (ref 3.5–5.3)
POTASSIUM SERPL-SCNC: 4.5 MMOL/L — SIGNIFICANT CHANGE UP (ref 3.5–5.3)
POTASSIUM SERPL-SCNC: 5.2 MMOL/L — SIGNIFICANT CHANGE UP (ref 3.5–5.3)
POTASSIUM SERPL-SCNC: 5.5 MMOL/L — HIGH (ref 3.5–5.3)
POTASSIUM SERPL-SCNC: 5.6 MMOL/L — HIGH (ref 3.5–5.3)
PROT SERPL-MCNC: 6.3 G/DL — SIGNIFICANT CHANGE UP (ref 6–8.3)
RBC # BLD: 3.59 M/UL — LOW (ref 3.8–5.2)
RBC # FLD: 21.7 % — HIGH (ref 10.3–14.5)
SODIUM SERPL-SCNC: 136 MMOL/L — SIGNIFICANT CHANGE UP (ref 135–145)
SODIUM SERPL-SCNC: 137 MMOL/L — SIGNIFICANT CHANGE UP (ref 135–145)
SODIUM SERPL-SCNC: 138 MMOL/L — SIGNIFICANT CHANGE UP (ref 135–145)
SODIUM SERPL-SCNC: 138 MMOL/L — SIGNIFICANT CHANGE UP (ref 135–145)
WBC # BLD: 14.31 K/UL — HIGH (ref 3.8–10.5)
WBC # FLD AUTO: 14.31 K/UL — HIGH (ref 3.8–10.5)

## 2017-09-13 PROCEDURE — 93010 ELECTROCARDIOGRAM REPORT: CPT

## 2017-09-13 PROCEDURE — 99233 SBSQ HOSP IP/OBS HIGH 50: CPT | Mod: GC

## 2017-09-13 RX ORDER — INSULIN HUMAN 100 [IU]/ML
5 INJECTION, SOLUTION SUBCUTANEOUS ONCE
Qty: 0 | Refills: 0 | Status: COMPLETED | OUTPATIENT
Start: 2017-09-13 | End: 2017-09-13

## 2017-09-13 RX ORDER — DEXTROSE 50 % IN WATER 50 %
50 SYRINGE (ML) INTRAVENOUS ONCE
Qty: 0 | Refills: 0 | Status: COMPLETED | OUTPATIENT
Start: 2017-09-13 | End: 2017-09-13

## 2017-09-13 RX ORDER — HYDROMORPHONE HYDROCHLORIDE 2 MG/ML
0.25 INJECTION INTRAMUSCULAR; INTRAVENOUS; SUBCUTANEOUS ONCE
Qty: 0 | Refills: 0 | Status: DISCONTINUED | OUTPATIENT
Start: 2017-09-13 | End: 2017-09-13

## 2017-09-13 RX ORDER — ALBUTEROL 90 UG/1
2.5 AEROSOL, METERED ORAL ONCE
Qty: 0 | Refills: 0 | Status: COMPLETED | OUTPATIENT
Start: 2017-09-13 | End: 2017-09-13

## 2017-09-13 RX ORDER — FUROSEMIDE 40 MG
40 TABLET ORAL ONCE
Qty: 0 | Refills: 0 | Status: COMPLETED | OUTPATIENT
Start: 2017-09-13 | End: 2017-09-13

## 2017-09-13 RX ORDER — OXYCODONE HYDROCHLORIDE 5 MG/1
2.5 TABLET ORAL ONCE
Qty: 0 | Refills: 0 | Status: DISCONTINUED | OUTPATIENT
Start: 2017-09-13 | End: 2017-09-13

## 2017-09-13 RX ADMIN — Medication 650 MILLIGRAM(S): at 11:25

## 2017-09-13 RX ADMIN — AMLODIPINE BESYLATE 10 MILLIGRAM(S): 2.5 TABLET ORAL at 06:39

## 2017-09-13 RX ADMIN — Medication 50 MILLILITER(S): at 17:12

## 2017-09-13 RX ADMIN — Medication 100 MILLIGRAM(S): at 06:39

## 2017-09-13 RX ADMIN — Medication 650 MILLIGRAM(S): at 06:39

## 2017-09-13 RX ADMIN — OXYCODONE HYDROCHLORIDE 2.5 MILLIGRAM(S): 5 TABLET ORAL at 23:09

## 2017-09-13 RX ADMIN — ALBUTEROL 2.5 MILLIGRAM(S): 90 AEROSOL, METERED ORAL at 17:17

## 2017-09-13 RX ADMIN — Medication 25 MILLIGRAM(S): at 22:48

## 2017-09-13 RX ADMIN — OXYCODONE HYDROCHLORIDE 2.5 MILLIGRAM(S): 5 TABLET ORAL at 23:39

## 2017-09-13 RX ADMIN — Medication 100 MILLIGRAM(S): at 17:09

## 2017-09-13 RX ADMIN — Medication 650 MILLIGRAM(S): at 17:09

## 2017-09-13 RX ADMIN — LACTULOSE 5 GRAM(S): 10 SOLUTION ORAL at 11:25

## 2017-09-13 RX ADMIN — INSULIN HUMAN 5 UNIT(S): 100 INJECTION, SOLUTION SUBCUTANEOUS at 17:11

## 2017-09-13 RX ADMIN — Medication 650 MILLIGRAM(S): at 23:10

## 2017-09-13 RX ADMIN — Medication 40 MILLIGRAM(S): at 17:16

## 2017-09-13 NOTE — PROGRESS NOTE ADULT - SUBJECTIVE AND OBJECTIVE BOX
Patient is a 57y old  Female who presents with a chief complaint of Peritonitis, Hemoperitoneum (03 Sep 2017 20:09)      SUBJECTIVE / OVERNIGHT EVENTS:  No overnight events. Pt reports pain continues to improve. She notes some itching around the PD catheter site - denies rash in the area. She denies any n/v/d, black or bloody stools, CP/SOB, dysuria, fevers or chills.      MEDICATIONS  (STANDING):  amLODIPine   Tablet 10 milliGRAM(s) Oral daily  docusate sodium 100 milliGRAM(s) Oral two times a day  sodium bicarbonate 650 milliGRAM(s) Oral four times a day  polyethylene glycol 3350 17 Gram(s) Oral daily  simethicone 80 milliGRAM(s) Chew two times a day  lactulose Syrup 5 Gram(s) Oral daily    MEDICATIONS  (PRN):  acetaminophen   Tablet. 650 milliGRAM(s) Oral every 6 hours PRN headache  diphenhydrAMINE   Capsule 25 milliGRAM(s) Oral two times a day PRN Rash and/or Itching        I&O's Summary    11 Sep 2017 07:01  -  12 Sep 2017 07:00  --------------------------------------------------------  IN: 1680 mL / OUT: 850 mL / NET: 830 mL    12 Sep 2017 07:01  -  13 Sep 2017 06:51  --------------------------------------------------------  IN: 1320 mL / OUT: 1000 mL / NET: 320 mL        PHYSICAL EXAM:  GENERAL: NAD, well-developed  HEAD:  Atraumatic, Normocephalic  EYES: conjunctiva and sclera clear  NECK: supple, no JVD  CHEST/LUNG: Clear to auscultation bilaterally; No wheezes, rhonchi or rales  HEART: Regular rate and rhythm; No murmurs, rubs, or gallops  ABDOMEN: PD catheter site with dressing c/d/i without rash. nt, soft  EXTREMITIES:  No edema  PSYCH: Appropriate  NEUROLOGY: non-focal  SKIN: No rashes or lesions, no erythema or other signs of infection around PD catheter site.    LABS:                         8.1    14.31 )-----------( 411      ( 13 Sep 2017 07:56 )             27.2     09-13    138  |  102  |  38<H>  ----------------------------<  158<H>  5.6<H>   |  19<L>  |  4.33<H>    Ca    8.0<L>      13 Sep 2017 14:18  Phos  4.1     09-13  Mg     1.9     09-13    TPro  6.3  /  Alb  2.7<L>  /  TBili  1.2  /  DBili  x   /  AST  15  /  ALT  <4<L>  /  AlkPhos  121<H>  09-13    PT/INR - ( 12 Sep 2017 07:30 )   PT: 13.2 sec;   INR: 1.16 ratio         PTT - ( 12 Sep 2017 07:30 )  PTT:33.7 sec      Labs reviewed remarkable for ELEV K, CBC STABLE.

## 2017-09-13 NOTE — PROGRESS NOTE ADULT - PROBLEM SELECTOR PLAN 8
Continue to monitor hemoglobin  - Continue to hold hydroxyurea/folate per renal as pt with anemia. Per outpatient records was held due to her worsening renal function.   - monitor wbc increasing slightly.

## 2017-09-13 NOTE — PROGRESS NOTE ADULT - PROBLEM SELECTOR PLAN 6
Relative hypotension, as labetalol and HCTZ are currently held  - Continue home amlodipine. Continue to monitor CBCs  - Transfuse for hg <7.0 or if symptomatic

## 2017-09-13 NOTE — PROGRESS NOTE ADULT - PROBLEM SELECTOR PLAN 1
Improved, likely 2/2 rupture of small peripancreatic varices/collateral venous mesenteric vessels/hematomas seen on CT. Had repeat CT with mildly enlarged hematoma, but overall improvement of hemoperitoneum  - Will be seen by general surgery today or tomorrow regarding inpatient removal of PD catheter  - Hgb stable, c/w daily CBC  - Continue to follow-up ID recommendations - OFF abx  - Tylenol for pain control  - Benadryl for abdominal itchiness PRN  - c/w bowel regimen. Avoid enemas given history of ESRD (concern for phosphorous absorption). Improved, likely 2/2 rupture of small peripancreatic varices/collateral venous mesenteric vessels/hematomas seen on CT. Had repeat CT with mildly enlarged hematoma, but overall improvement of hemoperitoneum  - Will be seen by general surgery today or tomorrow regarding inpatient removal of PD catheter given lack of insurance  - Hgb stable, c/w daily CBC  - Continue to follow-up ID recommendations - OFF abx  - Tylenol for pain control  - Benadryl for abdominal itchiness PRN  - c/w bowel regimen. Avoid enemas given history of ESRD (concern for phosphorous absorption).

## 2017-09-13 NOTE — PROGRESS NOTE ADULT - SUBJECTIVE AND OBJECTIVE BOX
Patient is a 57y old  Female who presents with a chief complaint of Peritonitis, Hemoperitoneum (03 Sep 2017 20:09)      SUBJECTIVE / OVERNIGHT EVENTS:  No overnight events.       MEDICATIONS  (STANDING):  amLODIPine   Tablet 10 milliGRAM(s) Oral daily  docusate sodium 100 milliGRAM(s) Oral two times a day  sodium bicarbonate 650 milliGRAM(s) Oral four times a day  polyethylene glycol 3350 17 Gram(s) Oral daily  simethicone 80 milliGRAM(s) Chew two times a day  lactulose Syrup 5 Gram(s) Oral daily    MEDICATIONS  (PRN):  acetaminophen   Tablet. 650 milliGRAM(s) Oral every 6 hours PRN headache  diphenhydrAMINE   Capsule 25 milliGRAM(s) Oral two times a day PRN Rash and/or Itching      CAPILLARY BLOOD GLUCOSE        I&O's Summary    11 Sep 2017 07:01  -  12 Sep 2017 07:00  --------------------------------------------------------  IN: 1680 mL / OUT: 850 mL / NET: 830 mL    12 Sep 2017 07:01  -  13 Sep 2017 06:51  --------------------------------------------------------  IN: 1320 mL / OUT: 1000 mL / NET: 320 mL        PHYSICAL EXAM:  GENERAL: NAD, well-developed  HEAD:  Atraumatic, Normocephalic  EYES:  NECK:   CHEST/LUNG: Clear to auscultation bilaterally; No wheezes, rhonchi or gallops  HEART: Regular rate and rhythm; No murmurs, rubs, or gallops  ABDOMEN: Soft, Nontender, Nondistended; Bowel sounds present  EXTREMITIES:  2+ Peripheral Pulses, No clubbing, cyanosis, or edema  PSYCH: Appropriate  NEUROLOGY: non-focal  SKIN: No rashes or lesions    LABS:                        7.9    14.71 )-----------( 456      ( 12 Sep 2017 07:30 )             26.6     09-12    137  |  105  |  32<H>  ----------------------------<  90  5.3   |  17<L>  |  4.20<H>    Ca    7.8<L>      12 Sep 2017 07:42  Phos  4.3     09-12  Mg     1.7     09-12    TPro  6.5  /  Alb  2.6<L>  /  TBili  1.1  /  DBili  x   /  AST  17  /  ALT  <4<L>  /  AlkPhos  121<H>  09-12    PT/INR - ( 12 Sep 2017 07:30 )   PT: 13.2 sec;   INR: 1.16 ratio         PTT - ( 12 Sep 2017 07:30 )  PTT:33.7 sec          RADIOLOGY & ADDITIONAL TESTS:    Imaging Personally Reviewed:    Consultant(s) Notes Reviewed:      Care Discussed with Consultants/Other Providers: Patient is a 57y old  Female who presents with a chief complaint of Peritonitis, Hemoperitoneum (03 Sep 2017 20:09)      SUBJECTIVE / OVERNIGHT EVENTS:  No overnight events. Pt reports pain continues to improve. She notes some itchiness around the PD catheter site. She denies any n/v/d, black or bloody stools, CP/SOB, dysuria, fevers or chills.      MEDICATIONS  (STANDING):  amLODIPine   Tablet 10 milliGRAM(s) Oral daily  docusate sodium 100 milliGRAM(s) Oral two times a day  sodium bicarbonate 650 milliGRAM(s) Oral four times a day  polyethylene glycol 3350 17 Gram(s) Oral daily  simethicone 80 milliGRAM(s) Chew two times a day  lactulose Syrup 5 Gram(s) Oral daily    MEDICATIONS  (PRN):  acetaminophen   Tablet. 650 milliGRAM(s) Oral every 6 hours PRN headache  diphenhydrAMINE   Capsule 25 milliGRAM(s) Oral two times a day PRN Rash and/or Itching      CAPILLARY BLOOD GLUCOSE        I&O's Summary    11 Sep 2017 07:01  -  12 Sep 2017 07:00  --------------------------------------------------------  IN: 1680 mL / OUT: 850 mL / NET: 830 mL    12 Sep 2017 07:01  -  13 Sep 2017 06:51  --------------------------------------------------------  IN: 1320 mL / OUT: 1000 mL / NET: 320 mL        PHYSICAL EXAM:  GENERAL: NAD, well-developed  HEAD:  Atraumatic, Normocephalic  EYES: conjunctiva and sclera clear  NECK: supple, no JVD  CHEST/LUNG: Clear to auscultation bilaterally; No wheezes, rhonchi or gallops  HEART: Regular rate and rhythm; No murmurs, rubs, or gallops  ABDOMEN: Soft, Nontender, Nondistended; Bowel sounds present  EXTREMITIES:  2+ Peripheral Pulses, No clubbing, cyanosis, or edema  PSYCH: Appropriate  NEUROLOGY: non-focal  SKIN: No rashes or lesions, no erythema or other signs of infection around PD catheter site.    LABS:                        7.9    14.71 )-----------( 456      ( 12 Sep 2017 07:30 )             26.6     09-12    137  |  105  |  32<H>  ----------------------------<  90  5.3   |  17<L>  |  4.20<H>    Ca    7.8<L>      12 Sep 2017 07:42  Phos  4.3     09-12  Mg     1.7     09-12    TPro  6.5  /  Alb  2.6<L>  /  TBili  1.1  /  DBili  x   /  AST  17  /  ALT  <4<L>  /  AlkPhos  121<H>  09-12    PT/INR - ( 12 Sep 2017 07:30 )   PT: 13.2 sec;   INR: 1.16 ratio         PTT - ( 12 Sep 2017 07:30 )  PTT:33.7 sec          RADIOLOGY & ADDITIONAL TESTS:    Imaging Personally Reviewed:    Consultant(s) Notes Reviewed:      Care Discussed with Consultants/Other Providers: Patient is a 57y old  Female who presents with a chief complaint of Peritonitis, Hemoperitoneum (03 Sep 2017 20:09)      SUBJECTIVE / OVERNIGHT EVENTS:  No overnight events. Pt reports pain continues to improve. She notes some itchiness around the PD catheter site. She denies any n/v/d, black or bloody stools, CP/SOB, dysuria, fevers or chills.      MEDICATIONS  (STANDING):  amLODIPine   Tablet 10 milliGRAM(s) Oral daily  docusate sodium 100 milliGRAM(s) Oral two times a day  sodium bicarbonate 650 milliGRAM(s) Oral four times a day  polyethylene glycol 3350 17 Gram(s) Oral daily  simethicone 80 milliGRAM(s) Chew two times a day  lactulose Syrup 5 Gram(s) Oral daily    MEDICATIONS  (PRN):  acetaminophen   Tablet. 650 milliGRAM(s) Oral every 6 hours PRN headache  diphenhydrAMINE   Capsule 25 milliGRAM(s) Oral two times a day PRN Rash and/or Itching      CAPILLARY BLOOD GLUCOSE        I&O's Summary    11 Sep 2017 07:01  -  12 Sep 2017 07:00  --------------------------------------------------------  IN: 1680 mL / OUT: 850 mL / NET: 830 mL    12 Sep 2017 07:01  -  13 Sep 2017 06:51  --------------------------------------------------------  IN: 1320 mL / OUT: 1000 mL / NET: 320 mL        PHYSICAL EXAM:  GENERAL: NAD, well-developed  HEAD:  Atraumatic, Normocephalic  EYES: conjunctiva and sclera clear  NECK: supple, no JVD  CHEST/LUNG: Clear to auscultation bilaterally; No wheezes, rhonchi or rales  HEART: Regular rate and rhythm; No murmurs, rubs, or gallops  ABDOMEN: PD catheter site with dressing c/d/i, improved tenderness to palpation, improved guarding, no rigidity  EXTREMITIES:  No edema  PSYCH: Appropriate  NEUROLOGY: non-focal  SKIN: No rashes or lesions, no erythema or other signs of infection around PD catheter site.    LABS:                        7.9    14.71 )-----------( 456      ( 12 Sep 2017 07:30 )             26.6     09-12    137  |  105  |  32<H>  ----------------------------<  90  5.3   |  17<L>  |  4.20<H>    Ca    7.8<L>      12 Sep 2017 07:42  Phos  4.3     09-12  Mg     1.7     09-12    TPro  6.5  /  Alb  2.6<L>  /  TBili  1.1  /  DBili  x   /  AST  17  /  ALT  <4<L>  /  AlkPhos  121<H>  09-12    PT/INR - ( 12 Sep 2017 07:30 )   PT: 13.2 sec;   INR: 1.16 ratio         PTT - ( 12 Sep 2017 07:30 )  PTT:33.7 sec          RADIOLOGY & ADDITIONAL TESTS:    Imaging Personally Reviewed:    Consultant(s) Notes Reviewed:      Care Discussed with Consultants/Other Providers: Patient is a 57y old  Female who presents with a chief complaint of Peritonitis, Hemoperitoneum (03 Sep 2017 20:09)      SUBJECTIVE / OVERNIGHT EVENTS:  No overnight events. Pt reports pain continues to improve. She notes some itching around the PD catheter site - denies rash in the area. She denies any n/v/d, black or bloody stools, CP/SOB, dysuria, fevers or chills.      MEDICATIONS  (STANDING):  amLODIPine   Tablet 10 milliGRAM(s) Oral daily  docusate sodium 100 milliGRAM(s) Oral two times a day  sodium bicarbonate 650 milliGRAM(s) Oral four times a day  polyethylene glycol 3350 17 Gram(s) Oral daily  simethicone 80 milliGRAM(s) Chew two times a day  lactulose Syrup 5 Gram(s) Oral daily    MEDICATIONS  (PRN):  acetaminophen   Tablet. 650 milliGRAM(s) Oral every 6 hours PRN headache  diphenhydrAMINE   Capsule 25 milliGRAM(s) Oral two times a day PRN Rash and/or Itching        I&O's Summary    11 Sep 2017 07:01  -  12 Sep 2017 07:00  --------------------------------------------------------  IN: 1680 mL / OUT: 850 mL / NET: 830 mL    12 Sep 2017 07:01  -  13 Sep 2017 06:51  --------------------------------------------------------  IN: 1320 mL / OUT: 1000 mL / NET: 320 mL        PHYSICAL EXAM:  GENERAL: NAD, well-developed  HEAD:  Atraumatic, Normocephalic  EYES: conjunctiva and sclera clear  NECK: supple, no JVD  CHEST/LUNG: Clear to auscultation bilaterally; No wheezes, rhonchi or rales  HEART: Regular rate and rhythm; No murmurs, rubs, or gallops  ABDOMEN: PD catheter site with dressing c/d/i without rash. nt, soft  EXTREMITIES:  No edema  PSYCH: Appropriate  NEUROLOGY: non-focal  SKIN: No rashes or lesions, no erythema or other signs of infection around PD catheter site.    LABS:                         8.1    14.31 )-----------( 411      ( 13 Sep 2017 07:56 )             27.2     09-13    138  |  102  |  38<H>  ----------------------------<  158<H>  5.6<H>   |  19<L>  |  4.33<H>    Ca    8.0<L>      13 Sep 2017 14:18  Phos  4.1     09-13  Mg     1.9     09-13    TPro  6.3  /  Alb  2.7<L>  /  TBili  1.2  /  DBili  x   /  AST  15  /  ALT  <4<L>  /  AlkPhos  121<H>  09-13    PT/INR - ( 12 Sep 2017 07:30 )   PT: 13.2 sec;   INR: 1.16 ratio         PTT - ( 12 Sep 2017 07:30 )  PTT:33.7 sec      Labs reviewed remarkable for ELEV K, CBC STABLE.

## 2017-09-13 NOTE — PROGRESS NOTE ADULT - PROBLEM SELECTOR PLAN 2
Possibly 2/2 traction of PD catheter with underlying varices vs. ruptures smaller collateral vessel during PD due to extensive collaterilization of venous mesenteric system seen on CT A/P per Renal.   - hgb stable, daily CBC  - Avoiding further lavages. Possibly 2/2 traction of PD catheter with underlying varices vs. ruptures smaller collateral vessel during PD due to extensive collateralization of venous mesenteric system seen on CT A/P per Renal.   - hgb stable, daily CBC  - Avoiding further lavages.

## 2017-09-13 NOTE — PROGRESS NOTE ADULT - PROBLEM SELECTOR PLAN 4
Radiology updated report, reported varices rather than lesions. Pt aware of findings. - Mildly elevated potassium on AM labs today. Repeat K shows persisent hyperK - will tx w/ alb/d50/insulin and lasix and repeat labs.

## 2017-09-13 NOTE — PROGRESS NOTE ADULT - PROBLEM SELECTOR PLAN 2
Possibly 2/2 traction of PD catheter with underlying varices vs. ruptures smaller collateral vessel during PD due to extensive collateralization of venous mesenteric system seen on CT A/P per Renal.   - hgb stable, daily CBC  - Avoiding further lavages.

## 2017-09-13 NOTE — PROGRESS NOTE ADULT - PROBLEM SELECTOR PLAN 9
SCDs given hemoperitoneum. Encourage ambulation.    Kala Mera MD  Internal Medicine, Intern  Pager [phone]
SCDs given hemoperitoneum. Encourage ambulation.    Kala Mera MD  Internal Medicine, Intern  Pager [phone]

## 2017-09-13 NOTE — PROGRESS NOTE ADULT - PROBLEM SELECTOR PLAN 1
Improved, likely 2/2 rupture of small peripancreatic varices/collateral venous mesenteric vessels/hematomas seen on CT. Had repeat CT with mildly enlarged hematoma, but overall improvement of hemoperitoneum  - Will be seen by general surgery today or tomorrow regarding inpatient removal of PD catheter given lack of insurance  - Hgb stable, c/w daily CBC  - Continue to follow-up ID recommendations - OFF abx  - Tylenol for pain control  - Benadryl for abdominal itchiness PRN  - c/w bowel regimen. Avoid enemas given history of ESRD (concern for phosphorous absorption).

## 2017-09-13 NOTE — PROGRESS NOTE ADULT - PROBLEM SELECTOR PLAN 8
SCDs given hemoperitoneum. Encourage ambulation. SCDs given hemoperitoneum. Encourage ambulation.    Kala Mera MD  Internal Medicine, Intern  Pager [phone] Continue to monitor hemoglobin  - Continue to hold hydroxyurea/folate per renal as pt with anemia. Per outpatient records was held due to her worsening renal function.   - monitor wbc increasing slightly.

## 2017-09-13 NOTE — PROGRESS NOTE ADULT - PROBLEM SELECTOR PLAN 7
Continue to monitor hemoglobin  - Continue to hold hydroxyurea/folate per renal as pt with anemia, also possibly nephrotoxic - readdress regarding restarting  - monitor wbc increasing slightly. Continue to monitor hemoglobin  - Continue to hold hydroxyurea/folate per renal as pt with anemia. Per outpatient records was held due to her worsening renal function.   - monitor wbc increasing slightly. Relative hypotension, as labetalol and HCTZ are currently held  - Continue home amlodipine.

## 2017-09-13 NOTE — PROGRESS NOTE ADULT - PROBLEM SELECTOR PLAN 3
Holding PD currently. No urgent need for dialysis currently.   - Per Renal, patient no longer a candidate for PD, will need AVF planning  - Pending vascular surgery evaluation. May need inpatient surgery for both HD access and PD catheter removal as may take time to obtain emergency medicaid.   - Vein mapping study ordered  - Vascular consulted for AVF placement  - Continue to follow nephrology recommendations.

## 2017-09-13 NOTE — PROGRESS NOTE ADULT - PROBLEM SELECTOR PLAN 5
Continue to monitor CBCs  - Transfuse for hg <7.0 or if symptomatic   - Continue orthostatic blood pressures. Continue to monitor CBCs  - Transfuse for hg <7.0 or if symptomatic Radiology updated report, reported varices rather than lesions. Pt aware of findings.

## 2017-09-13 NOTE — PROGRESS NOTE ADULT - ASSESSMENT
57 F w/ PMH of HTN, polycythemia vera, ESRD on PD (2/2 chronic GN) p/w hemoperitoneum and symptomatic hemorrhagic anemia likely 2/2 variceal bleed s/p lavage now pending HD setup 57 F w/ PMH of HTN, polycythemia vera, ESRD on PD (2/2 chronic GN) p/w hemoperitoneum and symptomatic hemorrhagic anemia likely 2/2 variceal bleed s/p lavage now pending HD setup.

## 2017-09-13 NOTE — PROGRESS NOTE ADULT - PROBLEM SELECTOR PLAN 4
- Mildly elevated potassium on AM labs today. Repeat K shows persisent hyperK - will tx w/ alb/d50/insulin and lasix and repeat labs.

## 2017-09-13 NOTE — PROGRESS NOTE ADULT - PROBLEM SELECTOR PLAN 3
Holding PD currently. No urgent need for dialysis currently.   - Per Renal, patient no longer a candidate for PD, will need AVF planning  - Vein mapping study ordered  - Vascular consulted for AVF placement  - Continue to follow nephrology recommendations. Holding PD currently. No urgent need for dialysis currently.   - Mildly elevated potassium on AM labs today. Pending repeat BMP early afternoon. Consider lasix if persistently elevated.  - Per Renal, patient no longer a candidate for PD, will need AVF planning  - Vein mapping study ordered  - Vascular consulted for AVF placement  - Continue to follow nephrology recommendations. Holding PD currently. No urgent need for dialysis currently.   - Mildly elevated potassium on AM labs today. Pending repeat BMP early afternoon. Consider lasix if persistently elevated.  - Per Renal, patient no longer a candidate for PD, will need AVF planning  - Pending vascular surgery evaluation. May need inpatient surgery for both HD access and PD catheter removal as may take time to obtain emergency medicaid.   - Vein mapping study ordered  - Vascular consulted for AVF placement  - Continue to follow nephrology recommendations. Holding PD currently. No urgent need for dialysis currently.   - Per Renal, patient no longer a candidate for PD, will need AVF planning  - Pending vascular surgery evaluation. May need inpatient surgery for both HD access and PD catheter removal as may take time to obtain emergency medicaid.   - Vein mapping study ordered  - Vascular consulted for AVF placement  - Continue to follow nephrology recommendations.

## 2017-09-14 LAB
ALBUMIN SERPL ELPH-MCNC: 2.7 G/DL — LOW (ref 3.3–5)
ALP SERPL-CCNC: 144 U/L — HIGH (ref 40–120)
ALT FLD-CCNC: 4 U/L — LOW (ref 10–45)
ANION GAP SERPL CALC-SCNC: 15 MMOL/L — SIGNIFICANT CHANGE UP (ref 5–17)
ANION GAP SERPL CALC-SCNC: 20 MMOL/L — HIGH (ref 5–17)
AST SERPL-CCNC: 15 U/L — SIGNIFICANT CHANGE UP (ref 10–40)
BASOPHILS # BLD AUTO: 0 K/UL — SIGNIFICANT CHANGE UP (ref 0–0.2)
BASOPHILS NFR BLD AUTO: 0 % — SIGNIFICANT CHANGE UP (ref 0–2)
BILIRUB SERPL-MCNC: 1 MG/DL — SIGNIFICANT CHANGE UP (ref 0.2–1.2)
BUN SERPL-MCNC: 43 MG/DL — HIGH (ref 7–23)
BUN SERPL-MCNC: 49 MG/DL — HIGH (ref 7–23)
CALCIUM SERPL-MCNC: 7.7 MG/DL — LOW (ref 8.4–10.5)
CALCIUM SERPL-MCNC: 8.2 MG/DL — LOW (ref 8.4–10.5)
CHLORIDE SERPL-SCNC: 101 MMOL/L — SIGNIFICANT CHANGE UP (ref 96–108)
CHLORIDE SERPL-SCNC: 101 MMOL/L — SIGNIFICANT CHANGE UP (ref 96–108)
CO2 SERPL-SCNC: 19 MMOL/L — LOW (ref 22–31)
CO2 SERPL-SCNC: 19 MMOL/L — LOW (ref 22–31)
CREAT SERPL-MCNC: 4.69 MG/DL — HIGH (ref 0.5–1.3)
CREAT SERPL-MCNC: 5.15 MG/DL — HIGH (ref 0.5–1.3)
EOSINOPHIL # BLD AUTO: 0.14 K/UL — SIGNIFICANT CHANGE UP (ref 0–0.5)
EOSINOPHIL NFR BLD AUTO: 0.9 % — SIGNIFICANT CHANGE UP (ref 0–6)
GLUCOSE SERPL-MCNC: 67 MG/DL — LOW (ref 70–99)
GLUCOSE SERPL-MCNC: 83 MG/DL — SIGNIFICANT CHANGE UP (ref 70–99)
HCT VFR BLD CALC: 26.5 % — LOW (ref 34.5–45)
HGB BLD-MCNC: 7.9 G/DL — LOW (ref 11.5–15.5)
LYMPHOCYTES # BLD AUTO: 0.93 K/UL — LOW (ref 1–3.3)
LYMPHOCYTES # BLD AUTO: 6 % — LOW (ref 13–44)
MAGNESIUM SERPL-MCNC: 2 MG/DL — SIGNIFICANT CHANGE UP (ref 1.6–2.6)
MCHC RBC-ENTMCNC: 22.3 PG — LOW (ref 27–34)
MCHC RBC-ENTMCNC: 29.8 GM/DL — LOW (ref 32–36)
MCV RBC AUTO: 74.9 FL — LOW (ref 80–100)
MONOCYTES # BLD AUTO: 0.66 K/UL — SIGNIFICANT CHANGE UP (ref 0–0.9)
MONOCYTES NFR BLD AUTO: 4.3 % — SIGNIFICANT CHANGE UP (ref 2–14)
NEUTROPHILS # BLD AUTO: 13.7 K/UL — HIGH (ref 1.8–7.4)
NEUTROPHILS NFR BLD AUTO: 88.8 % — HIGH (ref 43–77)
PHOSPHATE SERPL-MCNC: 4.9 MG/DL — HIGH (ref 2.5–4.5)
PLATELET # BLD AUTO: 407 K/UL — HIGH (ref 150–400)
POTASSIUM SERPL-MCNC: 4.9 MMOL/L — SIGNIFICANT CHANGE UP (ref 3.5–5.3)
POTASSIUM SERPL-MCNC: 5.9 MMOL/L — HIGH (ref 3.5–5.3)
POTASSIUM SERPL-SCNC: 4.9 MMOL/L — SIGNIFICANT CHANGE UP (ref 3.5–5.3)
POTASSIUM SERPL-SCNC: 5.9 MMOL/L — HIGH (ref 3.5–5.3)
PROT SERPL-MCNC: 6.5 G/DL — SIGNIFICANT CHANGE UP (ref 6–8.3)
RBC # BLD: 3.54 M/UL — LOW (ref 3.8–5.2)
RBC # FLD: 21.5 % — HIGH (ref 10.3–14.5)
SODIUM SERPL-SCNC: 135 MMOL/L — SIGNIFICANT CHANGE UP (ref 135–145)
SODIUM SERPL-SCNC: 140 MMOL/L — SIGNIFICANT CHANGE UP (ref 135–145)
WBC # BLD: 15.43 K/UL — HIGH (ref 3.8–10.5)
WBC # FLD AUTO: 15.43 K/UL — HIGH (ref 3.8–10.5)

## 2017-09-14 PROCEDURE — 99233 SBSQ HOSP IP/OBS HIGH 50: CPT | Mod: GC

## 2017-09-14 PROCEDURE — 93971 EXTREMITY STUDY: CPT | Mod: 26

## 2017-09-14 RX ORDER — DEXTROSE 50 % IN WATER 50 %
50 SYRINGE (ML) INTRAVENOUS ONCE
Qty: 0 | Refills: 0 | Status: COMPLETED | OUTPATIENT
Start: 2017-09-14 | End: 2017-09-14

## 2017-09-14 RX ORDER — INSULIN HUMAN 100 [IU]/ML
5 INJECTION, SOLUTION SUBCUTANEOUS ONCE
Qty: 0 | Refills: 0 | Status: COMPLETED | OUTPATIENT
Start: 2017-09-14 | End: 2017-09-14

## 2017-09-14 RX ORDER — SODIUM CHLORIDE 9 MG/ML
500 INJECTION INTRAMUSCULAR; INTRAVENOUS; SUBCUTANEOUS ONCE
Qty: 0 | Refills: 0 | Status: COMPLETED | OUTPATIENT
Start: 2017-09-14 | End: 2017-09-14

## 2017-09-14 RX ORDER — KETOROLAC TROMETHAMINE 30 MG/ML
15 SYRINGE (ML) INJECTION ONCE
Qty: 0 | Refills: 0 | Status: DISCONTINUED | OUTPATIENT
Start: 2017-09-14 | End: 2017-09-14

## 2017-09-14 RX ORDER — FUROSEMIDE 40 MG
60 TABLET ORAL ONCE
Qty: 0 | Refills: 0 | Status: COMPLETED | OUTPATIENT
Start: 2017-09-14 | End: 2017-09-14

## 2017-09-14 RX ORDER — ALBUTEROL 90 UG/1
10 AEROSOL, METERED ORAL ONCE
Qty: 0 | Refills: 0 | Status: DISCONTINUED | OUTPATIENT
Start: 2017-09-14 | End: 2017-09-14

## 2017-09-14 RX ORDER — INSULIN HUMAN 100 [IU]/ML
5 INJECTION, SOLUTION SUBCUTANEOUS ONCE
Qty: 0 | Refills: 0 | Status: DISCONTINUED | OUTPATIENT
Start: 2017-09-14 | End: 2017-09-14

## 2017-09-14 RX ORDER — HYDROMORPHONE HYDROCHLORIDE 2 MG/ML
0.25 INJECTION INTRAMUSCULAR; INTRAVENOUS; SUBCUTANEOUS ONCE
Qty: 0 | Refills: 0 | Status: DISCONTINUED | OUTPATIENT
Start: 2017-09-14 | End: 2017-09-14

## 2017-09-14 RX ORDER — ALBUTEROL 90 UG/1
2.5 AEROSOL, METERED ORAL ONCE
Qty: 0 | Refills: 0 | Status: COMPLETED | OUTPATIENT
Start: 2017-09-14 | End: 2017-09-14

## 2017-09-14 RX ADMIN — SIMETHICONE 80 MILLIGRAM(S): 80 TABLET, CHEWABLE ORAL at 05:56

## 2017-09-14 RX ADMIN — Medication 100 MILLIGRAM(S): at 05:56

## 2017-09-14 RX ADMIN — LACTULOSE 5 GRAM(S): 10 SOLUTION ORAL at 11:48

## 2017-09-14 RX ADMIN — HYDROMORPHONE HYDROCHLORIDE 0.25 MILLIGRAM(S): 2 INJECTION INTRAMUSCULAR; INTRAVENOUS; SUBCUTANEOUS at 00:47

## 2017-09-14 RX ADMIN — Medication 650 MILLIGRAM(S): at 05:56

## 2017-09-14 RX ADMIN — INSULIN HUMAN 5 UNIT(S): 100 INJECTION, SOLUTION SUBCUTANEOUS at 11:47

## 2017-09-14 RX ADMIN — Medication 50 MILLILITER(S): at 11:48

## 2017-09-14 RX ADMIN — HYDROMORPHONE HYDROCHLORIDE 0.25 MILLIGRAM(S): 2 INJECTION INTRAMUSCULAR; INTRAVENOUS; SUBCUTANEOUS at 01:17

## 2017-09-14 RX ADMIN — Medication 650 MILLIGRAM(S): at 17:57

## 2017-09-14 RX ADMIN — AMLODIPINE BESYLATE 10 MILLIGRAM(S): 2.5 TABLET ORAL at 05:56

## 2017-09-14 RX ADMIN — Medication 60 MILLIGRAM(S): at 11:46

## 2017-09-14 RX ADMIN — POLYETHYLENE GLYCOL 3350 17 GRAM(S): 17 POWDER, FOR SOLUTION ORAL at 11:49

## 2017-09-14 RX ADMIN — Medication 100 MILLIGRAM(S): at 17:57

## 2017-09-14 RX ADMIN — SODIUM CHLORIDE 1000 MILLILITER(S): 9 INJECTION INTRAMUSCULAR; INTRAVENOUS; SUBCUTANEOUS at 12:07

## 2017-09-14 RX ADMIN — Medication 650 MILLIGRAM(S): at 11:49

## 2017-09-14 RX ADMIN — ALBUTEROL 2.5 MILLIGRAM(S): 90 AEROSOL, METERED ORAL at 11:46

## 2017-09-14 RX ADMIN — Medication 15 MILLIGRAM(S): at 02:27

## 2017-09-14 RX ADMIN — Medication 15 MILLIGRAM(S): at 01:57

## 2017-09-14 NOTE — PROGRESS NOTE ADULT - PROBLEM SELECTOR PLAN 5
monitor calcium and phos.  calcium is low, likely due to low albumin.  Currently on ensure for hypoalbuminemia however ensure has high concentration of potassium.  Would change to nepro supplement. monitor calcium and phos.  calcium is low, likely due to low albumin.  Currently on ensure for hypoalbuminemia however ensure has high concentration of potassium.  Would change to nephro supplement.

## 2017-09-14 NOTE — CONSULT NOTE ADULT - ASSESSMENT
58 yo female with a history of htn, polycythemia vera, esrd on pd admitted with hemoperitoneum 2/2 variceal bleed now pending hd    -start left upper extremity precautions  -vein mapping of the left upper extremity ordered
This is a 56 yo F PMH HTN, Polycythemia Vera, ESRD on Peritoneal Dialysis 2/2 to chronic GN admitted with hemoperitoneum, with acute posthemorrhagic anemia, syncope due to blood loss, severe abdominal pain.  Will r/o bacterial peritonitis.
57y year old Female who presents with hemoperitoneum, patient had ESRD with peritoneal dialysis catheter placed on august, presented to OSH with bleeding in abdomen, transferred to [facility] for further management.  - Medical Management per ICU team  - HH Q 4hrs  - NPO/IVF  - Transfuse PRN  - No surgical intervention at present time  - d/w Dr. Elizabeth  - Surgery [facility] Team #[id number]
56 yo F with ESRD on peritoneal dialysis, PUD, polycythemia vera with episode of abd pain, hemoperitoneum. Patient had high leukocytosis on presentation, had a low grade elevated temp to 100.3F. No overt fevers, no chills, does not appear toxic. OSH reportedly with intraperitoneal leukocytosis in setting of shauna bleeding. Culture from OSH not available. Does not appear to have evidence of bowel perf or other infectious source on CT scan. Overall have low suspicion for infectious peritonitis (suspect leukocytosis, low grade elevated temp, abd pain all 2/2 to hematoma), but difficult to determine as primary diagnostic test (intraperitoneal cell count) wound be confounded by bleeding. Continue abx for now, follow up pending cultures.  - Continue Cefepime 1g q 48--although dosing dependent on plan for PD  - F/U BCX, Follow up lavage culture--if negative, most likely would stop antibiotics  - Follow up outside hospital culture      Jose Luis Bui MD  Pager [phone]  After 5pm and on weekends call [phone]

## 2017-09-14 NOTE — CONSULT NOTE ADULT - SUBJECTIVE AND OBJECTIVE BOX
56 yo female with history of htn, polycythemia vera, esrd on pd admitted with hemoperitoneum 2/2 variceal bleed now pending hd.  pt state sthat she is right hand dominant and no history of ppm    Vital Signs Last 24 Hrs  T(C): 36.8 (14 Sep 2017 05:02), Max: 37.2 (13 Sep 2017 13:25)  T(F): 98.3 (14 Sep 2017 05:02), Max: 99 (13 Sep 2017 13:25)  HR: 76 (14 Sep 2017 05:02) (76 - 100)  BP: 114/76 (14 Sep 2017 05:02) (114/76 - 147/83)  BP(mean): --  RR: 18 (14 Sep 2017 05:02) (18 - 20)  SpO2: 100% (14 Sep 2017 05:02) (97% - 100%)  gen: nad, a&o x 3   upper extremity: right upper extremity tender over iv infiltrated, left upper extremity no edema, palpable radial and brachial pulse    09-14    135  |  101  |  43<H>  ----------------------------<  83  5.9<H>   |  19<L>  |  4.69<H>    Ca    7.7<L>      14 Sep 2017 08:59  Phos  4.9     09-14  Mg     2.0     09-14    TPro  6.5  /  Alb  2.7<L>  /  TBili  1.0  /  DBili  x   /  AST  15  /  ALT  4<L>  /  AlkPhos  144<H>  09-14                          7.9    15.43 )-----------( 407      ( 14 Sep 2017 08:51 )             26.5

## 2017-09-14 NOTE — PROVIDER CONTACT NOTE (MEDICATION) - RECOMMENDATIONS
Arm/hand looks less swollen then when pain first began but pt still in 10/10 pain, crying & moaning.
Pain medications

## 2017-09-14 NOTE — PROGRESS NOTE ADULT - ATTENDING COMMENTS
Per Dr Aguilar's note  Labs suggestive of hyperkalemia  Trial of fluids and lasix today. If persistent, may need to start HD inpateint  Please repeat K later today as well as early am.   INDER tomorrow  Dr Dempsey to see patient today. She can have a AVF placed  and peritoneal dialysis catheter removed at the same time ( discussed with him)  Please preserve the left arm for future access placement ( No IV/blood draws/BP )

## 2017-09-14 NOTE — PROGRESS NOTE ADULT - PROBLEM SELECTOR PLAN 2
Was hoping to hold off on initiating hemodialysis, however patient now has hyperkalemia which only temporarily responded to medical therapy.  Hyperkalemia may be due to reabsorption of hemoperitoneum which could cause a temporary rise in potassium.  However, in this patient with ESRD, safest course would be to start hemodialysis now.  Would request temporary shiley catheter from vascular in addition to AVF placement and initiate hemodialysis today. Was hoping to hold off on initiating hemodialysis, however patient now has hyperkalemia which only temporarily responded to medical therapy.  Hyperkalemia may be due to reabsorption of hemoperitoneum which could cause a temporary rise in potassium.    Would do a trial of IVF as well as lasix today  If K remains elevated in am , we may need to start HD as an inpatient  Would monitor closely today

## 2017-09-14 NOTE — PROGRESS NOTE ADULT - PROBLEM SELECTOR PLAN 4
Resolved w/ alb/d50/insulin and lasix  - will trend potassium level to monitor for recurrence Temporarily resolved w/ alb/d50/insulin and lasix, but recurred this morning (K = 5.9)  - will give alb/d50/insulin and lasix and repeat potassium level Temporarily resolved w/ albuterol/D50/insulin and lasix, but recurred this morning (K = 5.9).  Possible due to decreasing GFR or diet, however patient reports adhering to strict renal diet  - medical management and repeat BMP this afternoon and evening  - d/w Renal - may need Shiley catheter placed and initiation of HD Temporarily resolved w/ albuterol/D50/insulin and lasix, but recurred this morning (K = 5.9).  Possible due to decreasing GFR or diet or blood resorption from abdomen (hemoperitoneum)  - medical management and repeat BMP this afternoon and evening  - d/w Renal - may need Shiley catheter placed and initiation of HD sooner

## 2017-09-14 NOTE — PROGRESS NOTE ADULT - PROBLEM SELECTOR PLAN 7
Hemoglobin stable  - Continue to hold hydroxyurea/folate per renal as pt with anemia. Per outpatient records was held due to her worsening renal function.   - trend CBC Hemoglobin stable  - Continue to hold hydroxyurea as can worsen anemia. Per outpatient records was held due to her worsening renal function.   - trend CBC

## 2017-09-14 NOTE — CHART NOTE - NSCHARTNOTEFT_GEN_A_CORE
Pt on OR schedule on Monday for PD cath removal.   Would appreciate optimization / preop labs Sunday night    DUANE Zayas PGY 2  [phone]

## 2017-09-14 NOTE — PROGRESS NOTE ADULT - PROBLEM SELECTOR PLAN 3
Holding PD currently. No urgent need for dialysis currently.   - Per Renal, patient no longer a candidate for PD, will need AVF planning  - Pending vascular surgery evaluation   - Vein mapping study ordered  - Continue to follow nephrology recommendations Per Renal, no further PD.  Monitoring electrolytes, however patient has been hyperkalemic for past 2 days.  - Per Renal, will need AVF planning  - Vascular Surgery (Dr. Dempsey) consulted, will f/u after vein mapping completed Per Renal, no further PD.  Monitoring electrolytes, however patient has been hyperkalemic for past 2 days.  - Per Renal, will need AVF planning for LUE. Will need f/u re: likely shiley placement for more urgent dialysis since persisent hyperK  - Vascular Surgery (Dr. Dempsey) consulted, vein mapping study performed

## 2017-09-14 NOTE — PROGRESS NOTE ADULT - PROBLEM SELECTOR PLAN 2
Possibly 2/2 traction of PD catheter with underlying varices vs. rupture smaller collateral vessel during PD due to extensive collateralization of venous mesenteric system seen on CT A/P per Renal.   - hgb stable, daily CBC  - avoid further lavages Possibly 2/2 traction of PD catheter with underlying varices vs. rupture smaller collateral vessel during PD due to extensive collateralization of venous mesenteric system seen on CT A/P.   - hgb stable, daily CBC  - avoid further lavages and use of PD catheter

## 2017-09-14 NOTE — PROGRESS NOTE ADULT - SUBJECTIVE AND OBJECTIVE BOX
Patient is a 57y old  Female who presents with a chief complaint of Peritonitis, Hemoperitoneum (03 Sep 2017 20:09)      SUBJECTIVE / OVERNIGHT EVENTS:      Vital Signs Last 24 Hrs  T(C): 36.8 (14 Sep 2017 05:02), Max: 37.2 (13 Sep 2017 13:25)  T(F): 98.3 (14 Sep 2017 05:02), Max: 99 (13 Sep 2017 13:25)  HR: 76 (14 Sep 2017 05:02) (76 - 100)  BP: 114/76 (14 Sep 2017 05:02) (114/76 - 147/83)  BP(mean): --  RR: 18 (14 Sep 2017 05:02) (18 - 20)  SpO2: 100% (14 Sep 2017 05:02) (97% - 100%)    PHYSICAL EXAM:  GENERAL: NAD, well-developed  HEAD:  Atraumatic, Normocephalic  EYES: EOMI, PERRLA, conjunctiva and sclera clear  NECK: Supple, No JVD  CHEST/LUNG: Clear to auscultation bilaterally; No wheeze  HEART: Regular rate and rhythm; No murmurs, rubs, or gallops  ABDOMEN: Soft, Nontender, Nondistended; Bowel sounds present  EXTREMITIES:  2+ Peripheral Pulses, No clubbing, cyanosis, or edema  PSYCH: AAOx3  NEUROLOGY: non-focal  SKIN: No rashes or lesions      LABS:                        8.1    14.31 )-----------( 411      ( 13 Sep 2017 07:56 )             27.2     09-13    138  |  102  |  39<H>  ----------------------------<  118<H>  5.2   |  20<L>  |  4.46<H>    Ca    8.2<L>      13 Sep 2017 22:52  Phos  4.1     09-13  Mg     1.9     09-13    TPro  6.3  /  Alb  2.7<L>  /  TBili  1.2  /  DBili  x   /  AST  15  /  ALT  <4<L>  /  AlkPhos  121<H>  09-13                CAPILLARY BLOOD GLUCOSE  238 (13 Sep 2017 18:00)        I&O's Summary    13 Sep 2017 07:01  -  14 Sep 2017 07:00  --------------------------------------------------------  IN: 1200 mL / OUT: 0 mL / NET: 1200 mL          MEDICATIONS  (STANDING):  amLODIPine   Tablet 10 milliGRAM(s) Oral daily  docusate sodium 100 milliGRAM(s) Oral two times a day  sodium bicarbonate 650 milliGRAM(s) Oral four times a day  polyethylene glycol 3350 17 Gram(s) Oral daily  simethicone 80 milliGRAM(s) Chew two times a day  lactulose Syrup 5 Gram(s) Oral daily    MEDICATIONS  (PRN):  acetaminophen   Tablet. 650 milliGRAM(s) Oral every 6 hours PRN headache  diphenhydrAMINE   Capsule 25 milliGRAM(s) Oral two times a day PRN Rash and/or Itching        RADIOLOGY & ADDITIONAL TESTS:    Imaging Personally Reviewed:    Consultant(s) Notes Reviewed:      Care Discussed with Consultants/Other Providers: Patient is a 57y old  Female who presents with a chief complaint of Peritonitis, Hemoperitoneum (03 Sep 2017 20:09)      SUBJECTIVE / OVERNIGHT EVENTS: Patient IV infiltrated overnight, pain improved with Toradol, swelling also improved. She reports minimal abd pain, denies any fever, chills, chest pain, SOB, n/v/d, black or bloody stools, or rashes      Vital Signs Last 24 Hrs  T(C): 36.8 (14 Sep 2017 05:02), Max: 37.2 (13 Sep 2017 13:25)  T(F): 98.3 (14 Sep 2017 05:02), Max: 99 (13 Sep 2017 13:25)  HR: 76 (14 Sep 2017 05:02) (76 - 100)  BP: 114/76 (14 Sep 2017 05:02) (114/76 - 147/83)  BP(mean): --  RR: 18 (14 Sep 2017 05:02) (18 - 20)  SpO2: 100% (14 Sep 2017 05:02) (97% - 100%)    PHYSICAL EXAM:  GENERAL: NAD, well-developed  HEAD:  Atraumatic, Normocephalic  EYES: EOMI, PERRLA, conjunctiva and sclera clear  NECK: Supple, No JVD  CHEST/LUNG: Clear to auscultation bilaterally; No wheeze  HEART: Regular rate and rhythm; No murmurs, rubs, or gallops  ABDOMEN: PD catheter site with dressing c/d/i, abd soft, NT/ND, bowel sounds present  EXTREMITIES:  No edema  PSYCH: AAOx3  NEUROLOGY: non-focal  SKIN: No rashes or lesions, no erythema or signs of infection around PD catheter site      LABS:                        8.1    14.31 )-----------( 411      ( 13 Sep 2017 07:56 )             27.2     09-13    138  |  102  |  39<H>  ----------------------------<  118<H>  5.2   |  20<L>  |  4.46<H>    Ca    8.2<L>      13 Sep 2017 22:52  Phos  4.1     09-13  Mg     1.9     09-13    TPro  6.3  /  Alb  2.7<L>  /  TBili  1.2  /  DBili  x   /  AST  15  /  ALT  <4<L>  /  AlkPhos  121<H>  09-13                CAPILLARY BLOOD GLUCOSE  238 (13 Sep 2017 18:00)        I&O's Summary    13 Sep 2017 07:01  -  14 Sep 2017 07:00  --------------------------------------------------------  IN: 1200 mL / OUT: 0 mL / NET: 1200 mL          MEDICATIONS  (STANDING):  amLODIPine   Tablet 10 milliGRAM(s) Oral daily  docusate sodium 100 milliGRAM(s) Oral two times a day  sodium bicarbonate 650 milliGRAM(s) Oral four times a day  polyethylene glycol 3350 17 Gram(s) Oral daily  simethicone 80 milliGRAM(s) Chew two times a day  lactulose Syrup 5 Gram(s) Oral daily    MEDICATIONS  (PRN):  acetaminophen   Tablet. 650 milliGRAM(s) Oral every 6 hours PRN headache  diphenhydrAMINE   Capsule 25 milliGRAM(s) Oral two times a day PRN Rash and/or Itching        RADIOLOGY & ADDITIONAL TESTS:    Imaging Personally Reviewed:    Consultant(s) Notes Reviewed:      Care Discussed with Consultants/Other Providers: Trav Domingo, MS4    Sara Zurita MD  PGY-3, Medicine Team 3  pager: [phone]      Patient is a 57y old  Female who presents with a chief complaint of Peritonitis, Hemoperitoneum (03 Sep 2017 20:09)      SUBJECTIVE / OVERNIGHT EVENTS:   Patient's IV infiltrated overnight as patient was receiving medical management for hyperkalemia.  Patient was given narcotics however pain improved with Toradol.  Swelling also now improved. She reports minimal abd pain, denies any fever, chills, chest pain, SOB, n/v/d, black or bloody stools, or rashes.      Vital Signs Last 24 Hrs  T(C): 36.8 (14 Sep 2017 05:02), Max: 37.2 (13 Sep 2017 13:25)  T(F): 98.3 (14 Sep 2017 05:02), Max: 99 (13 Sep 2017 13:25)  HR: 76 (14 Sep 2017 05:02) (76 - 100)  BP: 114/76 (14 Sep 2017 05:02) (114/76 - 147/83)  RR: 18 (14 Sep 2017 05:02) (18 - 20)  SpO2: 100% (14 Sep 2017 05:02) (97% - 100%)      PHYSICAL EXAM:  GENERAL: NAD, well-developed  HEAD:  Atraumatic, Normocephalic  EYES: EOMI, PERRLA, conjunctiva and sclera clear  NECK: Supple, No JVD  CHEST/LUNG: Clear to auscultation bilaterally; No wheeze  HEART: Regular rate and rhythm; No murmurs, rubs, or gallops  ABDOMEN: PD catheter site with dressing c/d/i, abd soft, NT/ND, bowel sounds present  EXTREMITIES:  No edema  PSYCH: AAOx3  NEUROLOGY: non-focal  SKIN: No rashes or lesions, no erythema or signs of infection around PD catheter site      LABS:                        8.1    14.31 )-----------( 411      ( 13 Sep 2017 07:56 )             27.2     09-13    138  |  102  |  39<H>  ----------------------------<  118<H>  5.2   |  20<L>  |  4.46<H>    Ca    8.2<L>      13 Sep 2017 22:52  Phos  4.1     09-13  Mg     1.9     09-13    TPro  6.3  /  Alb  2.7<L>  /  TBili  1.2  /  DBili  x   /  AST  15  /  ALT  <4<L>  /  AlkPhos  121<H>  09-13                CAPILLARY BLOOD GLUCOSE  238 (13 Sep 2017 18:00)        I&O's Summary    13 Sep 2017 07:01  -  14 Sep 2017 07:00  --------------------------------------------------------  IN: 1200 mL / OUT: 0 mL / NET: 1200 mL          MEDICATIONS  (STANDING):  amLODIPine   Tablet 10 milliGRAM(s) Oral daily  docusate sodium 100 milliGRAM(s) Oral two times a day  sodium bicarbonate 650 milliGRAM(s) Oral four times a day  polyethylene glycol 3350 17 Gram(s) Oral daily  simethicone 80 milliGRAM(s) Chew two times a day  lactulose Syrup 5 Gram(s) Oral daily    MEDICATIONS  (PRN):  acetaminophen   Tablet. 650 milliGRAM(s) Oral every 6 hours PRN headache  diphenhydrAMINE   Capsule 25 milliGRAM(s) Oral two times a day PRN Rash and/or Itching        RADIOLOGY & ADDITIONAL TESTS:    Imaging Personally Reviewed:    Consultant(s) Notes Reviewed:      Care Discussed with Consultants/Other Providers: Trav Domingo, MS4    Sara Zurita MD  PGY-3, Medicine Team 3  pager: [phone]      Patient is a 57y old  Female who presents with a chief complaint of Peritonitis, Hemoperitoneum (03 Sep 2017 20:09)      SUBJECTIVE / OVERNIGHT EVENTS:   Patient's IV infiltrated overnight as patient was receiving medical management for hyperkalemia.  Patient was given narcotics however pain improved with Toradol.  Swelling also now improved. She reports minimal abd pain, denies any fever, chills, chest pain, SOB, n/v/d, black or bloody stools, or rashes.      Vital Signs Last 24 Hrs  T(C): 36.8 (14 Sep 2017 05:02), Max: 37.2 (13 Sep 2017 13:25)  T(F): 98.3 (14 Sep 2017 05:02), Max: 99 (13 Sep 2017 13:25)  HR: 76 (14 Sep 2017 05:02) (76 - 100)  BP: 114/76 (14 Sep 2017 05:02) (114/76 - 147/83)  RR: 18 (14 Sep 2017 05:02) (18 - 20)  SpO2: 100% (14 Sep 2017 05:02) (97% - 100%)      PHYSICAL EXAM:  GENERAL: NAD, well-developed  HEAD:  Atraumatic, Normocephalic  EYES: EOMI, PERRLA, conjunctiva and sclera clear  NECK: Supple, No JVD  CHEST/LUNG: Clear to auscultation bilaterally; No wheeze  HEART: Regular rate and rhythm; No murmurs, rubs, or gallops  ABDOMEN: PD catheter site with dressing c/d/i, abd soft, NT/ND, bowel sounds present  EXTREMITIES:  No edema  PSYCH: AAOx3  NEUROLOGY: non-focal  SKIN: No rashes or lesions, no erythema or signs of infection around PD catheter site      LABS:                        7.9    15.43 )-----------( 407      ( 14 Sep 2017 08:51 )             26.5     09-14    135  |  101  |  43<H>  ----------------------------<  83  5.9<H>   |  19<L>  |  4.69<H>    Ca    7.7<L>      14 Sep 2017 08:59  Phos  4.9     09-14  Mg     2.0     09-14    TPro  6.5  /  Alb  2.7<L>  /  TBili  1.0  /  DBili  x   /  AST  15  /  ALT  4<L>  /  AlkPhos  144<H>  09-14        CAPILLARY BLOOD GLUCOSE  238 (13 Sep 2017 18:00)        I&O's Summary    13 Sep 2017 07:01  -  14 Sep 2017 07:00  --------------------------------------------------------  IN: 1200 mL / OUT: 0 mL / NET: 1200 mL          MEDICATIONS  (STANDING):  amLODIPine   Tablet 10 milliGRAM(s) Oral daily  docusate sodium 100 milliGRAM(s) Oral two times a day  sodium bicarbonate 650 milliGRAM(s) Oral four times a day  polyethylene glycol 3350 17 Gram(s) Oral daily  simethicone 80 milliGRAM(s) Chew two times a day  lactulose Syrup 5 Gram(s) Oral daily    MEDICATIONS  (PRN):  acetaminophen   Tablet. 650 milliGRAM(s) Oral every 6 hours PRN headache  diphenhydrAMINE   Capsule 25 milliGRAM(s) Oral two times a day PRN Rash and/or Itching Trav Doimngo, MS4    Sara Zurita MD  PGY-3, Medicine Team 3  pager: [phone]      Patient is a 57y old  Female who presents with a chief complaint of Peritonitis, Hemoperitoneum (03 Sep 2017 20:09)      SUBJECTIVE / OVERNIGHT EVENTS:   Patient's IV infiltrated overnight as patient was receiving medical management for hyperkalemia.  Patient was given narcotics however pain improved with Toradol.  Swelling of RUE also now improved. She reports minimal abd pain, denies any fever, chills, chest pain, SOB, n/v/d, black or bloody stools, or rashes.    Vital Signs Last 24 Hrs  T(C): 36.4 (14 Sep 2017 16:33), Max: 37.2 (13 Sep 2017 21:00)  T(F): 97.6 (14 Sep 2017 16:33), Max: 99 (13 Sep 2017 21:00)  HR: 104 (14 Sep 2017 16:33) (76 - 108)  BP: 158/87 (14 Sep 2017 16:33) (114/76 - 158/87)  BP(mean): --  RR: 20 (14 Sep 2017 16:33) (18 - 20)  SpO2: 100% (14 Sep 2017 16:33) (97% - 100%)      PHYSICAL EXAM:  GENERAL: NAD, well-developed  HEAD:  Atraumatic, Normocephalic  EYES: EOMI, PERRLA, conjunctiva and sclera clear  NECK: Supple, No JVD  CHEST/LUNG: Clear to auscultation bilaterally; No wheeze  HEART: Regular rate and rhythm; No murmurs, rubs, or gallops  ABDOMEN: PD catheter site with dressing c/d/i, abd soft, NT/ND, bowel sounds present  EXTREMITIES: RUE with edema, mild TTP  PSYCH: AAOx3  NEUROLOGY: non-focal  SKIN: No rashes or lesions, no erythema or signs of infection around PD catheter site      LABS:                        7.9    15.43 )-----------( 407      ( 14 Sep 2017 08:51 )             26.5     09-14    135  |  101  |  43<H>  ----------------------------<  83  5.9<H>   |  19<L>  |  4.69<H>    Ca    7.7<L>      14 Sep 2017 08:59  Phos  4.9     09-14  Mg     2.0     09-14    TPro  6.5  /  Alb  2.7<L>  /  TBili  1.0  /  DBili  x   /  AST  15  /  ALT  4<L>  /  AlkPhos  144<H>  09-14        CAPILLARY BLOOD GLUCOSE  238 (13 Sep 2017 18:00)    LABS REVIEWED - CR CONTINUES TO RISE AS WELL AS K. CBC STABLE.        I&O's Summary    13 Sep 2017 07:01  -  14 Sep 2017 07:00  --------------------------------------------------------  IN: 1200 mL / OUT: 0 mL / NET: 1200 mL          MEDICATIONS  (STANDING):  amLODIPine   Tablet 10 milliGRAM(s) Oral daily  docusate sodium 100 milliGRAM(s) Oral two times a day  sodium bicarbonate 650 milliGRAM(s) Oral four times a day  polyethylene glycol 3350 17 Gram(s) Oral daily  simethicone 80 milliGRAM(s) Chew two times a day  lactulose Syrup 5 Gram(s) Oral daily    MEDICATIONS  (PRN):  acetaminophen   Tablet. 650 milliGRAM(s) Oral every 6 hours PRN headache  diphenhydrAMINE   Capsule 25 milliGRAM(s) Oral two times a day PRN Rash and/or Itching

## 2017-09-14 NOTE — PROVIDER CONTACT NOTE (MEDICATION) - ASSESSMENT
At 2040 pt complained of some pain at IV site, removed site asymptomatic, warm & tender. Around 2200  pt complained of increase pain & arm appeared swollen. Contacted MD to assess. Received PO Oxy with no relief & Dilaudid 0.25 IV
At 2040 pt complained of some pain at IV site, removed site asymptomatic, warm & tender. Around 2200  pt complained of increase pain & arm appeared swollen. Contacted MD to assess.

## 2017-09-14 NOTE — PROGRESS NOTE ADULT - ASSESSMENT
Patient is a 58 y/o F w/ ESRD on HD who presented to OSH with abdominal pain, found with significant hemoperitoneum likely from rupture of collateral mesenteric vessel, deemed PD contraindicated pending transition to HD.

## 2017-09-14 NOTE — PROGRESS NOTE ADULT - SUBJECTIVE AND OBJECTIVE BOX
[facility] DIVISION OF KIDNEY DISEASES AND HYPERTENSION -- FOLLOW UP NOTE  --------------------------------------------------------------------------------  Chief Complaint: ESRD previously on PD    24 hour events/subjective:  Patient had hyperkalemia yesterday which was treated with cocktail and IV lasix.  Patient reports severe right arm and left hand pain due to infiltrated IV.  Labs noted from this morning with worsening hyperkalemia.        PAST HISTORY  --------------------------------------------------------------------------------  No significant changes to PMH, PSH, FHx, SHx, unless otherwise noted    ALLERGIES & MEDICATIONS  --------------------------------------------------------------------------------  Allergies    No Known Allergies    Intolerances      Standing Inpatient Medications  amLODIPine   Tablet 10 milliGRAM(s) Oral daily  docusate sodium 100 milliGRAM(s) Oral two times a day  sodium bicarbonate 650 milliGRAM(s) Oral four times a day  polyethylene glycol 3350 17 Gram(s) Oral daily  simethicone 80 milliGRAM(s) Chew two times a day  lactulose Syrup 5 Gram(s) Oral daily  ALBUTerol   0.5% 10 milliGRAM(s) Nebulizer once  dextrose 50% Injectable 50 milliLiter(s) IV Push once  insulin regular  human recombinant. 5 Unit(s) SubCutaneous once    PRN Inpatient Medications  acetaminophen   Tablet. 650 milliGRAM(s) Oral every 6 hours PRN  diphenhydrAMINE   Capsule 25 milliGRAM(s) Oral two times a day PRN      REVIEW OF SYSTEMS  --------------------------------------------------------------------------------  Gen: No fevers/chills  Skin: No rashes  Head/Eyes/Ears/Mouth: No headache  Respiratory: No dyspnea  CV: No chest pain  GI: No abdominal pain  : No dysuria  MSK: +left arm pain, + left hand pain, + Left arm edema  Neuro: No dizziness/lightheadedness    VITALS/PHYSICAL EXAM  --------------------------------------------------------------------------------  T(C): 36.8 (09-14-17 @ 05:02), Max: 37.2 (09-13-17 @ 13:25)  HR: 76 (09-14-17 @ 05:02) (76 - 100)  BP: 114/76 (09-14-17 @ 05:02) (114/76 - 147/83)  RR: 18 (09-14-17 @ 05:02) (18 - 20)  SpO2: 100% (09-14-17 @ 05:02) (97% - 100%)  Wt(kg): --        09-13-17 @ 07:01  -  09-14-17 @ 07:00  --------------------------------------------------------  IN: 1200 mL / OUT: 0 mL / NET: 1200 mL      Physical Exam:  	Gen: NAD, well-appearing  	HEENT: MMM  	Pulm: CTA B/L  	CV: RRR, S1S2; no rub  	Abd: +BS, soft, +tenderness significantly improved  	: No suprapubic tenderness  	UE:  no edema  	LE:  +left arm edema  	Neuro: No focal deficits  	Psych: Normal affect and mood  	Skin: Warm  	Vascular access: + PD catheter    LABS/STUDIES  --------------------------------------------------------------------------------              8.1    14.31 >-----------<  411      [09-13-17 @ 07:56]              27.2     135  |  101  |  43  ----------------------------<  83      [09-14-17 @ 08:59]  5.9   |  19  |  4.69        Ca     7.7     [09-14-17 @ 08:59]      Mg     2.0     [09-14-17 @ 08:59]      Phos  4.9     [09-14-17 @ 08:59]    TPro  6.5  /  Alb  2.7  /  TBili  1.0  /  DBili  x   /  AST  15  /  ALT  4   /  AlkPhos  144  [09-14-17 @ 08:59]    Creatinine Trend:  SCr 4.69 [09-14 @ 08:59]  SCr 4.46 [09-13 @ 22:52]  SCr 4.21 [09-13 @ 19:13]  SCr 4.33 [09-13 @ 14:18]  SCr 4.16 [09-13 @ 07:47]

## 2017-09-14 NOTE — PROGRESS NOTE ADULT - PROBLEM SELECTOR PLAN 8
SCDs given hemoperitoneum. Encourage ambulation SCDs given hemoperitoneum. Encourage ambulation      Trav Domingo, MS4    Sara Zurita MD  PGY-3, Medicine Team 3  pager: [phone] SCDs given hemoperitoneum. Pt ambulating  No PT needs  f/u SW - emergency insurance      Trav Domingo, MS4    Sara Zurita MD  PGY-3, Medicine Team 3  pager: [phone]

## 2017-09-14 NOTE — PROVIDER CONTACT NOTE (MEDICATION) - BACKGROUND
Dx hemoperitoneum. PD on hold due to multiple hematomas in abdomen, pending veing graphing for AVF
Dx hemoperitneum. PD on hold due to multiple hematomas. Pending vein graphing

## 2017-09-14 NOTE — PROGRESS NOTE ADULT - PROBLEM SELECTOR PLAN 1
Hemoglobin stable.  PD discontinued indefinitely.  Abdominal pain improving.  Will continue to monitor hgb.

## 2017-09-14 NOTE — PROGRESS NOTE ADULT - PROBLEM SELECTOR PLAN 1
Improved, likely 2/2 rupture of small peripancreatic varices/collateral venous mesenteric vessels/hematomas seen on CT. Had repeat CT with mildly enlarged hematoma, but overall improvement of hemoperitoneum  - Will f/u gen surg regarding inpatient removal of PD catheter given lack of insurance  - Hgb stable, c/w daily CBC  - Continue to follow-up ID recommendations - OFF abx  - Tylenol for pain control  - Benadryl for abdominal itchiness PRN  - c/w bowel regimen. Avoid enemas given history of ESRD (concern for phosphorous absorption). Improved, likely 2/2 rupture of small peripancreatic varices/collateral venous mesenteric vessels/hematomas seen on CT. Had repeat CT with mildly enlarged hematoma, but overall improvement of hemoperitoneum  - Will f/u gen surg regarding inpatient removal of PD catheter given lack of insurance  - Hgb stable, c/w daily CBC  - Continue to follow-up ID recommendations - OFF abx  - Tylenol or dilaudid for pain control, depending on severity  - Benadryl for abdominal itchiness PRN  - c/w bowel regimen. Avoid enemas given history of ESRD (concern for phosphorous absorption). Improved, likely 2/2 rupture of small peripancreatic varices/collateral venous mesenteric vessels/hematomas seen on CT.  Had repeat CT with mildly enlarged hematoma, but overall improvement of hemoperitoneum.  - General Surgery (pager [phone]) following regarding inpatient removal of PD catheter given lack of insurance.  No surgical management of hemoperitoneum necessary.  - Hgb stable, c/w daily CBC  - ID previously following, agreed with holding all abx.   - c/w bowel regimen. Avoid enemas given history of ESRD (concern for phosphorous absorption). Improved, likely 2/2 rupture of small peripancreatic varices/collateral venous mesenteric vessels/hematomas seen on CT.  Had repeat CT with mildly enlarged hematoma, but overall improvement of hemoperitoneum.  - irene gen surg f/u -pt scheduled for OR monday for removal of PD cath given insurance issues  - Hgb stable, c/w daily CBC  - ID previously following, agreed with holding all abx.   - c/w bowel regimen. Avoid enemas given history of ESRD (concern for phosphorous absorption).

## 2017-09-14 NOTE — PROVIDER CONTACT NOTE (MEDICATION) - ACTION/TREATMENT ORDERED:
MD reassessed pt & ordered Toradol
Will come up to assess pt, please get IV access to give pain medication. Oxy 2.5 mg PO.

## 2017-09-15 LAB
ALBUMIN SERPL ELPH-MCNC: 3.3 G/DL — SIGNIFICANT CHANGE UP (ref 3.3–5)
ALP SERPL-CCNC: 148 U/L — HIGH (ref 40–120)
ALT FLD-CCNC: 9 U/L RC — LOW (ref 10–45)
ANION GAP SERPL CALC-SCNC: 18 MMOL/L — HIGH (ref 5–17)
AST SERPL-CCNC: 13 U/L — SIGNIFICANT CHANGE UP (ref 10–40)
BILIRUB SERPL-MCNC: 1.1 MG/DL — SIGNIFICANT CHANGE UP (ref 0.2–1.2)
BUN SERPL-MCNC: 47 MG/DL — HIGH (ref 7–23)
CALCIUM SERPL-MCNC: 7.9 MG/DL — LOW (ref 8.4–10.5)
CHLORIDE SERPL-SCNC: 100 MMOL/L — SIGNIFICANT CHANGE UP (ref 96–108)
CO2 SERPL-SCNC: 18 MMOL/L — LOW (ref 22–31)
CREAT SERPL-MCNC: 4.83 MG/DL — HIGH (ref 0.5–1.3)
GLUCOSE SERPL-MCNC: 226 MG/DL — HIGH (ref 70–99)
HCT VFR BLD CALC: 27.9 % — LOW (ref 34.5–45)
HGB BLD-MCNC: 8.8 G/DL — LOW (ref 11.5–15.5)
MAGNESIUM SERPL-MCNC: 1.9 MG/DL — SIGNIFICANT CHANGE UP (ref 1.6–2.6)
MCHC RBC-ENTMCNC: 24.2 PG — LOW (ref 27–34)
MCHC RBC-ENTMCNC: 31.6 GM/DL — LOW (ref 32–36)
MCV RBC AUTO: 76.6 FL — LOW (ref 80–100)
PHOSPHATE SERPL-MCNC: 5.4 MG/DL — HIGH (ref 2.5–4.5)
PLATELET # BLD AUTO: 471 K/UL — HIGH (ref 150–400)
POTASSIUM SERPL-MCNC: 5.1 MMOL/L — SIGNIFICANT CHANGE UP (ref 3.5–5.3)
POTASSIUM SERPL-SCNC: 5.1 MMOL/L — SIGNIFICANT CHANGE UP (ref 3.5–5.3)
PROT SERPL-MCNC: 6.8 G/DL — SIGNIFICANT CHANGE UP (ref 6–8.3)
RBC # BLD: 3.64 M/UL — LOW (ref 3.8–5.2)
RBC # FLD: 21.3 % — HIGH (ref 10.3–14.5)
SODIUM SERPL-SCNC: 136 MMOL/L — SIGNIFICANT CHANGE UP (ref 135–145)
WBC # BLD: 14.9 K/UL — HIGH (ref 3.8–10.5)
WBC # FLD AUTO: 14.9 K/UL — HIGH (ref 3.8–10.5)

## 2017-09-15 PROCEDURE — 99233 SBSQ HOSP IP/OBS HIGH 50: CPT | Mod: GC

## 2017-09-15 RX ORDER — ERYTHROPOIETIN 10000 [IU]/ML
10000 INJECTION, SOLUTION INTRAVENOUS; SUBCUTANEOUS ONCE
Qty: 0 | Refills: 0 | Status: COMPLETED | OUTPATIENT
Start: 2017-09-15 | End: 2017-09-15

## 2017-09-15 RX ADMIN — SIMETHICONE 80 MILLIGRAM(S): 80 TABLET, CHEWABLE ORAL at 17:24

## 2017-09-15 RX ADMIN — Medication 100 MILLIGRAM(S): at 05:16

## 2017-09-15 RX ADMIN — Medication 650 MILLIGRAM(S): at 17:23

## 2017-09-15 RX ADMIN — POLYETHYLENE GLYCOL 3350 17 GRAM(S): 17 POWDER, FOR SOLUTION ORAL at 13:29

## 2017-09-15 RX ADMIN — Medication 25 MILLIGRAM(S): at 01:03

## 2017-09-15 RX ADMIN — AMLODIPINE BESYLATE 10 MILLIGRAM(S): 2.5 TABLET ORAL at 05:16

## 2017-09-15 RX ADMIN — LACTULOSE 5 GRAM(S): 10 SOLUTION ORAL at 13:29

## 2017-09-15 RX ADMIN — Medication 650 MILLIGRAM(S): at 05:16

## 2017-09-15 RX ADMIN — Medication 650 MILLIGRAM(S): at 01:01

## 2017-09-15 RX ADMIN — Medication 100 MILLIGRAM(S): at 17:23

## 2017-09-15 RX ADMIN — Medication 650 MILLIGRAM(S): at 13:30

## 2017-09-15 RX ADMIN — ERYTHROPOIETIN 10000 UNIT(S): 10000 INJECTION, SOLUTION INTRAVENOUS; SUBCUTANEOUS at 16:06

## 2017-09-15 NOTE — PROGRESS NOTE ADULT - PROBLEM SELECTOR PLAN 4
Resolved since yesterday afternoon after medical management w/ albuterol/D50/insulin and lasix. This was possibly due to decreasing GFR or diet or blood resorption from abdomen (hemoperitoneum)  - Trend bmp, medical management if hyperkalemia recurs  - d/w Renal - may need Shiley catheter placed and initiation of HD sooner if hyperkalemia is refractory to medical management Resolved since yesterday afternoon after medical management w/ albuterol/D50/insulin and lasix/IVF. This was possibly due to decreasing GFR or diet or blood resorption from abdomen (hemoperitoneum)  - Trend BMP, medical management if hyperkalemia recurs  - d/w Renal - may need Shiley catheter placed and initiation of HD sooner if hyperkalemia is refractory to medical management, however trying to avoid

## 2017-09-15 NOTE — PROGRESS NOTE ADULT - PROBLEM SELECTOR PLAN 1
Improved, likely 2/2 rupture of small peripancreatic varices/collateral venous mesenteric vessels/hematomas seen on CT.  Had repeat CT with mildly enlarged hematoma, but overall improvement of hemoperitoneum.  - Pt on OR schedule for monday for removal of PD cath  - Hgb stable, c/w daily CBC  - ID previously following, agreed with holding all abx.   - c/w bowel regimen. Avoid enemas given history of ESRD (concern for phosphorous absorption).

## 2017-09-15 NOTE — PROGRESS NOTE ADULT - PROBLEM SELECTOR PLAN 7
Hemoglobin stable  - Continue to hold hydroxyurea as can worsen anemia. Per outpatient records was held due to her worsening renal function.   - trend CBC

## 2017-09-15 NOTE — PROGRESS NOTE ADULT - ATTENDING COMMENTS
58 yo female with recent new start PD ([employer]), chronic GN, polycythemia vera was at home on Sat completed PD and drained without any difficulty  6 hours later pt reported sharp pain while at home felt weak and then [facility] syncop[facility]  The catheter was manipulated at that time and was noted to have hemoperitoneum and CT confirmed that  Given abx and transfused 1 unit    ESRD:   Currently with stable electrolytes and resolution of hyperkalemia  No urgent indication for dialysis   maintain on sodium bicarbonate  Low K diet    Hemoperitoneum: reviewed in detail with the radiologist. 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 Its likely that one of the smaller vessels ruptured causing the hemoperitoneum. Given the above finding, she is no longer a  peritoneal dialysis candidate.  Discussed with Dr Anaya who is agreeable to removing dialysis catheter.   For  AVF  placement by Dr Dempsey  Monitoring off antibiotics     Anemia:  epogen weekly ( received 10.000 units 9/8) , redose today

## 2017-09-15 NOTE — PROGRESS NOTE ADULT - PROBLEM SELECTOR PLAN 2
PD discontinued due to hemoperitoneum.  Managing electrolytes and volume medically with ESRD level renal function.  Maintain strict renal restrictions.  Plan for PD catheter removal on Monday.  Hopeful for AV fistula placement soon.  No urgent HD indications at this time.

## 2017-09-15 NOTE — PROGRESS NOTE ADULT - PROBLEM SELECTOR PLAN 8
SCDs given hemoperitoneum. Pt ambulating  No PT needs  f/u SW - emergency insurance      Trav Domingo, MS4    Sara Zurita MD  PGY-3, Medicine Team 3  pager: [phone] SCDs given hemoperitoneum. Pt ambulating  No PT needs  f/u SW - emergency medicaid. Goal to facilitate medical workup inpt given insurance issues      Trav Domingo, MS4    Sara Zurita MD  PGY-3, Medicine Team 3  pager: [phone]

## 2017-09-15 NOTE — PROGRESS NOTE ADULT - ASSESSMENT
Patient is a 58 y/o F w/ ESRD on HD who presented to OSH with abdominal pain, found with significant hemoperitoneum likely from rupture of collateral mesenteric vessel, deemed that PD is contraindicated, pending transition to HD.

## 2017-09-15 NOTE — PROGRESS NOTE ADULT - ASSESSMENT
57 F w/ PMH of HTN, polycythemia vera, ESRD on PD (2/2 chronic GN) p/w hemoperitoneum and symptomatic hemorrhagic anemia likely 2/2 variceal bleed s/p lavage now pending HD setup. Coordinating with general and vascular surgery for both PD catheter removal (on OR schedule for Monday currently) and AVF placement (vein mapping LUE performed 9/14) 57 F w/ PMHX of HTN, polycythemia vera (no longer on Hydrea), ESRD started on PD (cath placed 8/2017), transferred from OSH for management.   Initial concern for peritonitis (now resolved, with negative peritoneal fluid culture, off abx) and also found to have multiple hematomas within abdomen, stable on repeat CT A/P, also with stable hemoglobin.  CT A/P also showed extensive collateralization of mesenteric vasculature and possible cause of hemoperitoneum is rupture of one of the collateral vessels during PD - therefore patient no longer a candidate for PD and will need HD.  Given that patient only has emergency Medicaid, will need PD catheter removal and AVF placement (by Dr. Dempsey) done inpatient.  Planned for PD catheter removal Monday 9/18. 57 F w/ PMH of HTN, polycythemia vera, ESRD on PD (2/2 chronic GN) p/w hemoperitoneum and symptomatic hemorrhagic anemia likely 2/2 variceal bleed s/p lavage now pending HD setup, PD cath removal for Monday 9/18

## 2017-09-15 NOTE — PROGRESS NOTE ADULT - PROBLEM SELECTOR PLAN 5
monitor calcium and phos.  correct calcium still within normal limits.  Will consider start calcium carbonate if hypocalcemia does not resolve despite rising albumin.

## 2017-09-15 NOTE — PROGRESS NOTE ADULT - SUBJECTIVE AND OBJECTIVE BOX
Patient is a 57y old  Female who presents with a chief complaint of Peritonitis, Hemoperitoneum (03 Sep 2017 20:09)      SUBJECTIVE / OVERNIGHT EVENTS: Pt reports intermittent cramping pain of bilateral calves and left hand last night. Otherwise denies any abd pain, fever, chills, chest pain, SOB, n/v/d, black or bloody stools or rashes.      Vital Signs Last 24 Hrs  T(C): 37.2 (15 Sep 2017 04:53), Max: 37.3 (14 Sep 2017 21:04)  T(F): 99 (15 Sep 2017 04:53), Max: 99.1 (14 Sep 2017 21:04)  HR: 85 (15 Sep 2017 04:53) (85 - 108)  BP: 116/71 (15 Sep 2017 04:53) (116/71 - 158/87)  BP(mean): --  RR: 18 (15 Sep 2017 04:53) (18 - 20)  SpO2: 96% (15 Sep 2017 04:53) (96% - 100%)    PHYSICAL EXAM:  GENERAL: NAD, well-developed  HEAD:  Atraumatic, Normocephalic  EYES: EOMI, PERRLA, conjunctiva and sclera clear  NECK: Supple, No JVD  CHEST/LUNG: Clear to auscultation bilaterally; No wheeze  HEART: Regular rate and rhythm; No murmurs, rubs, or gallops  ABDOMEN: PD catheter site with dressing c/d/i, abd soft, NT/ND, bowel sounds present  EXTREMITIES: slight edema and mild TTP volar aspect of right wrist at site where IV infiltrated, no LE edema, no calf TTP b/l  PSYCH: AAOx3  NEUROLOGY: non-focal  SKIN: No rashes or lesions, no erythema or signs of infection around PD catheter site      LABS:                        8.8    14.9  )-----------( 471      ( 15 Sep 2017 09:52 )             27.9     09-15    136  |  100  |  47<H>  ----------------------------<  226<H>  5.1   |  18<L>  |  4.83<H>    Ca    7.9<L>      15 Sep 2017 09:53  Phos  5.4     09-15  Mg     1.9     09-15    TPro  6.8  /  Alb  3.3  /  TBili  1.1  /  DBili  x   /  AST  13  /  ALT  9<L>  /  AlkPhos  148<H>  09-15      I&O's Summary    14 Sep 2017 07:01  -  15 Sep 2017 07:00  --------------------------------------------------------  IN: 1200 mL / OUT: 0 mL / NET: 1200 mL      MEDICATIONS  (STANDING):  amLODIPine   Tablet 10 milliGRAM(s) Oral daily  docusate sodium 100 milliGRAM(s) Oral two times a day  sodium bicarbonate 650 milliGRAM(s) Oral four times a day  polyethylene glycol 3350 17 Gram(s) Oral daily  simethicone 80 milliGRAM(s) Chew two times a day  lactulose Syrup 5 Gram(s) Oral daily  epoetin filiberto Injectable 62213 Unit(s) SubCutaneous once    MEDICATIONS  (PRN):  acetaminophen   Tablet. 650 milliGRAM(s) Oral every 6 hours PRN headache  diphenhydrAMINE   Capsule 25 milliGRAM(s) Oral two times a day PRN Rash and/or Itching Trav Domingo, MS 4    Sara Zurita MD  PGY-3, Medicine Team 3  pager: [phone]      Patient is a 57y old  Female who presents with a chief complaint of Peritonitis, Hemoperitoneum (03 Sep 2017 20:09)      SUBJECTIVE / OVERNIGHT EVENTS:   Pt reports intermittent cramping pain of bilateral calves and left hand last night, which resolved without intervention.  Otherwise denies any abd pain, fever, chills, chest pain, SOB, n/v/d, black or bloody stools or rashes.      Vital Signs Last 24 Hrs  T(C): 37.2 (15 Sep 2017 04:53), Max: 37.3 (14 Sep 2017 21:04)  T(F): 99 (15 Sep 2017 04:53), Max: 99.1 (14 Sep 2017 21:04)  HR: 85 (15 Sep 2017 04:53) (85 - 108)  BP: 116/71 (15 Sep 2017 04:53) (116/71 - 158/87)  RR: 18 (15 Sep 2017 04:53) (18 - 20)  SpO2: 96% (15 Sep 2017 04:53) (96% - 100%)      PHYSICAL EXAM:  GENERAL: NAD, well-developed  HEAD:  Atraumatic, Normocephalic  EYES: EOMI, PERRLA, conjunctiva and sclera clear  NECK: Supple, No JVD  CHEST/LUNG: Clear to auscultation bilaterally; No wheeze  HEART: Regular rate and rhythm; No murmurs, rubs, or gallops  ABDOMEN: PD catheter site with dressing c/d/i, abd soft, NT/ND, bowel sounds present  EXTREMITIES: slight edema and mild TTP volar aspect of right wrist at site where IV infiltrated, no LE edema, no calf TTP b/l  PSYCH: AAOx3  NEUROLOGY: non-focal  SKIN: No rashes or lesions, no erythema or signs of infection around PD catheter site      LABS:                        8.8    14.9  )-----------( 471      ( 15 Sep 2017 09:52 )             27.9     09-15    136  |  100  |  47<H>  ----------------------------<  226<H>  5.1   |  18<L>  |  4.83<H>    Ca    7.9<L>      15 Sep 2017 09:53  Phos  5.4     09-15  Mg     1.9     09-15    TPro  6.8  /  Alb  3.3  /  TBili  1.1  /  DBili  x   /  AST  13  /  ALT  9<L>  /  AlkPhos  148<H>  09-15      I&O's Summary    14 Sep 2017 07:01  -  15 Sep 2017 07:00  --------------------------------------------------------  IN: 1200 mL / OUT: 0 mL / NET: 1200 mL      MEDICATIONS  (STANDING):  amLODIPine   Tablet 10 milliGRAM(s) Oral daily  docusate sodium 100 milliGRAM(s) Oral two times a day  sodium bicarbonate 650 milliGRAM(s) Oral four times a day  polyethylene glycol 3350 17 Gram(s) Oral daily  simethicone 80 milliGRAM(s) Chew two times a day  lactulose Syrup 5 Gram(s) Oral daily  epoetin filiberto Injectable 59108 Unit(s) SubCutaneous once    MEDICATIONS  (PRN):  acetaminophen   Tablet. 650 milliGRAM(s) Oral every 6 hours PRN headache  diphenhydrAMINE   Capsule 25 milliGRAM(s) Oral two times a day PRN Rash and/or Itching Trav Domingo, MS 4    Sara Zurita MD  PGY-3, Medicine Team 3  pager: [phone]      Patient is a 57y old  Female who presents with a chief complaint of Peritonitis, Hemoperitoneum (03 Sep 2017 20:09)      SUBJECTIVE / OVERNIGHT EVENTS:   Pt reports intermittent cramping pain of bilateral calves and left hand last night, which resolved without intervention.  Otherwise denies any abd pain, fever, chills, chest pain, SOB, n/v/d, black or bloody stools or rashes.      Vital Signs Last 24 Hrs  T(C): 37 (15 Sep 2017 14:27), Max: 37.3 (14 Sep 2017 21:04)  T(F): 98.6 (15 Sep 2017 14:27), Max: 99.1 (14 Sep 2017 21:04)  HR: 80 (15 Sep 2017 14:27) (80 - 86)  BP: 123/79 (15 Sep 2017 14:27) (116/71 - 123/79)  BP(mean): --  RR: 20 (15 Sep 2017 14:27) (18 - 20)  SpO2: 98% (15 Sep 2017 14:27) (96% - 98%)      PHYSICAL EXAM:  GENERAL: NAD, well-developed  HEAD:  Atraumatic, Normocephalic  EYES: EOMI, PERRLA, conjunctiva and sclera clear  NECK: Supple, No JVD  CHEST/LUNG: Clear to auscultation bilaterally; No wheeze  HEART: Regular rate and rhythm; No murmurs, rubs, or gallops  ABDOMEN: PD catheter site with dressing c/d/i, abd soft, NT/ND, bowel sounds present  EXTREMITIES: slight edema and mild TTP volar aspect of right wrist at site where IV infiltrated, no LE edema, no calf TTP b/l  PSYCH: AAOx3  NEUROLOGY: non-focal  SKIN: No rashes or lesions, no erythema or signs of infection around PD catheter site      LABS:                        8.8    14.9  )-----------( 471      ( 15 Sep 2017 09:52 )             27.9     09-15    136  |  100  |  47<H>  ----------------------------<  226<H>  5.1   |  18<L>  |  4.83<H>    Ca    7.9<L>      15 Sep 2017 09:53  Phos  5.4     09-15  Mg     1.9     09-15    TPro  6.8  /  Alb  3.3  /  TBili  1.1  /  DBili  x   /  AST  13  /  ALT  9<L>  /  AlkPhos  148<H>  09-15      I&O's Summary    14 Sep 2017 07:01  -  15 Sep 2017 07:00  --------------------------------------------------------  IN: 1200 mL / OUT: 0 mL / NET: 1200 mL    LABS REVIEWED - STABLE.    MEDICATIONS  (STANDING):  amLODIPine   Tablet 10 milliGRAM(s) Oral daily  docusate sodium 100 milliGRAM(s) Oral two times a day  sodium bicarbonate 650 milliGRAM(s) Oral four times a day  polyethylene glycol 3350 17 Gram(s) Oral daily  simethicone 80 milliGRAM(s) Chew two times a day  lactulose Syrup 5 Gram(s) Oral daily  epoetin filiberto Injectable 46986 Unit(s) SubCutaneous once    MEDICATIONS  (PRN):  acetaminophen   Tablet. 650 milliGRAM(s) Oral every 6 hours PRN headache  diphenhydrAMINE   Capsule 25 milliGRAM(s) Oral two times a day PRN Rash and/or Itching

## 2017-09-15 NOTE — PROGRESS NOTE ADULT - PROBLEM SELECTOR PLAN 2
Possibly 2/2 traction of PD catheter with underlying varices vs. rupture smaller collateral vessel during PD due to extensive collateralization of venous mesenteric system seen on CT A/P.   - hgb stable, daily CBC  - avoid further lavages, on OR schedule for removal of PD catheter on Monday 9/18

## 2017-09-15 NOTE — PROGRESS NOTE ADULT - SUBJECTIVE AND OBJECTIVE BOX
[facility] DIVISION OF KIDNEY DISEASES AND HYPERTENSION -- FOLLOW UP NOTE  --------------------------------------------------------------------------------  Chief Complaint:  ESRD on PD    24 hour events/subjective: Patient reports feeling well, abdominal pain has significantly improved and upper extremity pain has also improved.        PAST HISTORY  --------------------------------------------------------------------------------  No significant changes to PMH, PSH, FHx, SHx, unless otherwise noted    ALLERGIES & MEDICATIONS  --------------------------------------------------------------------------------  Allergies    No Known Allergies    Intolerances      Standing Inpatient Medications  amLODIPine   Tablet 10 milliGRAM(s) Oral daily  docusate sodium 100 milliGRAM(s) Oral two times a day  sodium bicarbonate 650 milliGRAM(s) Oral four times a day  polyethylene glycol 3350 17 Gram(s) Oral daily  simethicone 80 milliGRAM(s) Chew two times a day  lactulose Syrup 5 Gram(s) Oral daily    PRN Inpatient Medications  acetaminophen   Tablet. 650 milliGRAM(s) Oral every 6 hours PRN  diphenhydrAMINE   Capsule 25 milliGRAM(s) Oral two times a day PRN      REVIEW OF SYSTEMS  --------------------------------------------------------------------------------  Gen: No fevers/chills  Skin: No rashes  Head/Eyes/Ears/Mouth: No headache  Respiratory: No dyspnea  CV: No chest pain  GI: No abdominal pain  : No dysuria  MSK: No edema  Neuro: No dizziness/lightheadedness    VITALS/PHYSICAL EXAM  --------------------------------------------------------------------------------  T(C): 37.2 (09-15-17 @ 04:53), Max: 37.3 (09-14-17 @ 21:04)  HR: 85 (09-15-17 @ 04:53) (85 - 108)  BP: 116/71 (09-15-17 @ 04:53) (116/71 - 158/87)  RR: 18 (09-15-17 @ 04:53) (18 - 20)  SpO2: 96% (09-15-17 @ 04:53) (96% - 100%)  Wt(kg): --        09-14-17 @ 07:01  -  09-15-17 @ 07:00  --------------------------------------------------------  IN: 1200 mL / OUT: 0 mL / NET: 1200 mL      Physical Exam:  	Gen: NAD, well-appearing  	HEENT: MMM  	Pulm: CTA B/L  	CV: RRR, S1S2; no rub  	Abd: +BS, soft, tenderness significantly improved  	: No suprapubic tenderness  	UE:  +right arm edema improving  	LE:  no pitting edema  	Neuro: No focal deficits  	Psych: Normal affect and mood  	Skin: Warm  	Vascular access: +PD catheter    LABS/STUDIES  --------------------------------------------------------------------------------              8.8    14.9  >-----------<  471      [09-15-17 @ 09:52]              27.9     136  |  100  |  47  ----------------------------<  226      [09-15-17 @ 09:53]  5.1   |  18  |  4.83        Ca     7.9     [09-15-17 @ 09:53]      Mg     1.9     [09-15-17 @ 09:53]      Phos  5.4     [09-15-17 @ 09:53]    TPro  6.8  /  Alb  3.3  /  TBili  1.1  /  DBili  x   /  AST  13  /  ALT  9   /  AlkPhos  148  [09-15-17 @ 09:53]    Creatinine Trend:  SCr 4.83 [09-15 @ 09:53]  SCr 5.15 [09-14 @ 16:29]  SCr 4.69 [09-14 @ 08:59]  SCr 4.46 [09-13 @ 22:52]  SCr 4.21 [09-13 @ 19:13]

## 2017-09-15 NOTE — PROGRESS NOTE ADULT - PROBLEM SELECTOR PLAN 3
Per Renal, no further PD. Monitoring electrolytes, patient was hyperkalemic yesterday morning but after medical management with albuterol/insulin/D50/lasix potassium has been within normal range.  - Planning for LUE AVF placement for HD by vascular surgery (Dr. Dempsey), LUE vein mapping performed 9/14  - Will consider consulting surgery for shiley placement for urgent HD if hyperkalemia becomes refractory to medical management Per Renal, no further PD. Monitoring electrolytes, patient was hyperkalemic yesterday morning but after medical management with albuterol/insulin/D50/lasix potassium has been within normal range.  - Planning for LUE AVF placement for HD by vascular surgery (Dr. Dempsey), LUE vein mapping performed 9/14  - Will consider consulting surgery for shiley placement for urgent HD if hyperkalemia becomes refractory to medical management, however trying to avoid initially HD for now Per Renal, no further PD. Monitoring electrolytes, patient was hyperkalemic yesterday morning but after medical management with albuterol/insulin/D50/lasix potassium has been within normal range.  - Planning for LUE AVF placement for HD by vascular surgery (Dr. Dempsey), LUE vein mapping performed 9/14  - Will consider consulting surgery for shiley placement for urgent HD if hyperkalemia becomes refractory to medical management, however trying to avoid HD for now

## 2017-09-16 LAB
ALBUMIN SERPL ELPH-MCNC: 2.8 G/DL — LOW (ref 3.3–5)
ALP SERPL-CCNC: 163 U/L — HIGH (ref 40–120)
ALT FLD-CCNC: 6 U/L — LOW (ref 10–45)
ANION GAP SERPL CALC-SCNC: 19 MMOL/L — HIGH (ref 5–17)
AST SERPL-CCNC: 14 U/L — SIGNIFICANT CHANGE UP (ref 10–40)
BILIRUB SERPL-MCNC: 0.9 MG/DL — SIGNIFICANT CHANGE UP (ref 0.2–1.2)
BUN SERPL-MCNC: 48 MG/DL — HIGH (ref 7–23)
CALCIUM SERPL-MCNC: 7.7 MG/DL — LOW (ref 8.4–10.5)
CHLORIDE SERPL-SCNC: 100 MMOL/L — SIGNIFICANT CHANGE UP (ref 96–108)
CO2 SERPL-SCNC: 16 MMOL/L — LOW (ref 22–31)
CREAT SERPL-MCNC: 5.01 MG/DL — HIGH (ref 0.5–1.3)
GLUCOSE SERPL-MCNC: 120 MG/DL — HIGH (ref 70–99)
HCT VFR BLD CALC: 25.9 % — LOW (ref 34.5–45)
HGB BLD-MCNC: 7.8 G/DL — LOW (ref 11.5–15.5)
MAGNESIUM SERPL-MCNC: 2.1 MG/DL — SIGNIFICANT CHANGE UP (ref 1.6–2.6)
MCHC RBC-ENTMCNC: 22.5 PG — LOW (ref 27–34)
MCHC RBC-ENTMCNC: 30.1 GM/DL — LOW (ref 32–36)
MCV RBC AUTO: 74.9 FL — LOW (ref 80–100)
PHOSPHATE SERPL-MCNC: 5.7 MG/DL — HIGH (ref 2.5–4.5)
PLATELET # BLD AUTO: 475 K/UL — HIGH (ref 150–400)
POTASSIUM SERPL-MCNC: 5.2 MMOL/L — SIGNIFICANT CHANGE UP (ref 3.5–5.3)
POTASSIUM SERPL-SCNC: 5.2 MMOL/L — SIGNIFICANT CHANGE UP (ref 3.5–5.3)
PROT SERPL-MCNC: 6.7 G/DL — SIGNIFICANT CHANGE UP (ref 6–8.3)
RBC # BLD: 3.46 M/UL — LOW (ref 3.8–5.2)
RBC # FLD: 21.7 % — HIGH (ref 10.3–14.5)
SODIUM SERPL-SCNC: 135 MMOL/L — SIGNIFICANT CHANGE UP (ref 135–145)
WBC # BLD: 13.57 K/UL — HIGH (ref 3.8–10.5)
WBC # FLD AUTO: 13.57 K/UL — HIGH (ref 3.8–10.5)

## 2017-09-16 PROCEDURE — 99233 SBSQ HOSP IP/OBS HIGH 50: CPT | Mod: GC

## 2017-09-16 RX ORDER — FUROSEMIDE 40 MG
40 TABLET ORAL ONCE
Qty: 0 | Refills: 0 | Status: COMPLETED | OUTPATIENT
Start: 2017-09-16 | End: 2017-09-16

## 2017-09-16 RX ORDER — CALCIUM ACETATE 667 MG
667 TABLET ORAL
Qty: 0 | Refills: 0 | Status: DISCONTINUED | OUTPATIENT
Start: 2017-09-16 | End: 2017-09-18

## 2017-09-16 RX ADMIN — Medication 667 MILLIGRAM(S): at 13:08

## 2017-09-16 RX ADMIN — SIMETHICONE 80 MILLIGRAM(S): 80 TABLET, CHEWABLE ORAL at 17:52

## 2017-09-16 RX ADMIN — Medication 100 MILLIGRAM(S): at 17:53

## 2017-09-16 RX ADMIN — Medication 650 MILLIGRAM(S): at 00:15

## 2017-09-16 RX ADMIN — Medication 667 MILLIGRAM(S): at 17:51

## 2017-09-16 RX ADMIN — AMLODIPINE BESYLATE 10 MILLIGRAM(S): 2.5 TABLET ORAL at 05:04

## 2017-09-16 RX ADMIN — Medication 650 MILLIGRAM(S): at 05:04

## 2017-09-16 RX ADMIN — Medication 650 MILLIGRAM(S): at 23:29

## 2017-09-16 RX ADMIN — LACTULOSE 5 GRAM(S): 10 SOLUTION ORAL at 12:32

## 2017-09-16 RX ADMIN — Medication 650 MILLIGRAM(S): at 17:51

## 2017-09-16 RX ADMIN — Medication 650 MILLIGRAM(S): at 12:33

## 2017-09-16 RX ADMIN — Medication 40 MILLIGRAM(S): at 13:08

## 2017-09-16 RX ADMIN — Medication 100 MILLIGRAM(S): at 05:04

## 2017-09-16 NOTE — PROGRESS NOTE ADULT - PROBLEM SELECTOR PLAN 2
Possibly 2/2 traction of PD catheter with underlying varices vs. rupture smaller collateral vessel during PD due to extensive collateralization of venous mesenteric system seen on CT A/P. Hb stable  - Plan for removal of PD cath on Monday  - c/w daily CBC

## 2017-09-16 NOTE — PROGRESS NOTE ADULT - PROBLEM SELECTOR PLAN 5
Relative hypotension, as labetalol and HCTZ are currently held  - Continue home amlodipine. Relative hypotension, as labetalol and HCTZ are currently held  - Hold amlodipine for now as will give Lasix x1

## 2017-09-16 NOTE — PROGRESS NOTE ADULT - PROBLEM SELECTOR PLAN 7
DVT ppx  - SCDs and encourage ambulation due to recent bleeding    f/u SW - emergency medicaid. Goal to facilitate medical workup inpt given insurance issues      Jacobo Ureña MD  PGY-3  pager: [phone]

## 2017-09-16 NOTE — PROGRESS NOTE ADULT - PROBLEM SELECTOR PLAN 6
Hb stable  - Continue to hold hydroxyurea as can worsen anemia. Per outpatient records was held due to her worsening renal function.   - trend CBC

## 2017-09-16 NOTE — PROGRESS NOTE ADULT - ATTENDING COMMENTS
Ms. Landin is doing well today, watching television and teaching me [place]ish. Lasix 40 IV x 1 today for K at 5.2 w/ Cr 5, she's still making urine- not treating the 5.2 but the anticipated higher K+ in the service of chronic HD dependence. Will discuss with renal tomorrow placing a shiley for temporary access to avoid emergent placement if we can: no urgent need for HD though would prefer to have access should this need arise. Patient is curious if both the PD catheter removal and AVF will happen during the same procedure on monday- I do not know, I ask the team to clarify with consultants and let us know tomorrow. Rest as above.

## 2017-09-16 NOTE — PROGRESS NOTE ADULT - ASSESSMENT
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

## 2017-09-16 NOTE — PROGRESS NOTE ADULT - SUBJECTIVE AND OBJECTIVE BOX
Patient is a 57y old  Female who presents with a chief complaint of Peritonitis, Hemoperitoneum (03 Sep 2017 20:09)      SUBJECTIVE / OVERNIGHT EVENTS: No acute events overnight. Mild R forearm pain and swelling after IV infiltration.     MEDICATIONS  (STANDING):  amLODIPine   Tablet 10 milliGRAM(s) Oral daily  docusate sodium 100 milliGRAM(s) Oral two times a day  sodium bicarbonate 650 milliGRAM(s) Oral four times a day  polyethylene glycol 3350 17 Gram(s) Oral daily  simethicone 80 milliGRAM(s) Chew two times a day  lactulose Syrup 5 Gram(s) Oral daily    MEDICATIONS  (PRN):  acetaminophen   Tablet. 650 milliGRAM(s) Oral every 6 hours PRN headache  diphenhydrAMINE   Capsule 25 milliGRAM(s) Oral two times a day PRN Rash and/or Itching      Vital Signs Last 24 Hrs  T(C): 36.9 (16 Sep 2017 05:04), Max: 37 (15 Sep 2017 14:27)  T(F): 98.4 (16 Sep 2017 05:04), Max: 98.6 (15 Sep 2017 14:27)  HR: 78 (16 Sep 2017 05:04) (78 - 88)  BP: 112/71 (16 Sep 2017 05:04) (112/71 - 123/79)  BP(mean): --  RR: 18 (16 Sep 2017 05:04) (18 - 20)  SpO2: 96% (16 Sep 2017 05:04) (96% - 98%)  CAPILLARY BLOOD GLUCOSE        I&O's Summary    15 Sep 2017 07:01  -  16 Sep 2017 07:00  --------------------------------------------------------  IN: 1540 mL / OUT: 240 mL / NET: 1300 mL        REVIEW OF SYSTEMS    General: No fevers/chills  Skin/Breast: No rash  Ophthalmologic: No vision changes  ENMT:No dysphagia or odynophagia  Respiratory and Thorax: No SOB, wheezing, cough  Cardiovascular: No chest pain or palpitations  Gastrointestinal: No n/v/d, No melena, No Hematochezia  Genitourinary:  No dysuria, No change in urinary frequency  Musculoskeletal: Mild improved R forearm pain and swelling  Neurological: No focal deficits, No headache    PHYSICAL EXAM:  GENERAL: NAD, well-developed  HEAD:  Atraumatic, Normocephalic  EYES: EOMI, PERRLA, conjunctiva and sclera clear  NECK: Supple, No JVD  CHEST/LUNG: Clear to auscultation bilaterally; No wheeze  HEART: Regular rate and rhythm; No murmurs, rubs, or gallops  ABDOMEN: Soft, Nontender, Nondistended; Bowel sounds present. PD catheter noted, C/D/I  EXTREMITIES: Pulses intact. R forearm mild swelling and TTP. No clubbing, cyanosis.  PSYCH: AAOx3  NEUROLOGY: non-focal  SKIN: No rashes or lesions    LABS:                        7.8    13.57 )-----------( 475      ( 16 Sep 2017 08:08 )             25.9     09-16    135  |  100  |  48<H>  ----------------------------<  120<H>  5.2   |  16<L>  |  5.01<H>    Ca    7.7<L>      16 Sep 2017 08:01  Phos  5.7     09-16  Mg     2.1     09-16    TPro  6.7  /  Alb  2.8<L>  /  TBili  0.9  /  DBili  x   /  AST  14  /  ALT  6<L>  /  AlkPhos  163<H>  09-16              RADIOLOGY & ADDITIONAL TESTS:    Imaging Personally Reviewed:    Consultant(s) Notes Reviewed:      Care Discussed with Consultants/Other Providers: Patient is a 57y old  Female who presents with a chief complaint of Peritonitis, Hemoperitoneum (03 Sep 2017 20:09)      SUBJECTIVE / OVERNIGHT EVENTS: No acute events overnight. Mild R forearm pain and swelling after IV infiltration.     MEDICATIONS  (STANDING):  amLODIPine   Tablet 10 milliGRAM(s) Oral daily  docusate sodium 100 milliGRAM(s) Oral two times a day  sodium bicarbonate 650 milliGRAM(s) Oral four times a day  polyethylene glycol 3350 17 Gram(s) Oral daily  simethicone 80 milliGRAM(s) Chew two times a day  lactulose Syrup 5 Gram(s) Oral daily    MEDICATIONS  (PRN):  acetaminophen   Tablet. 650 milliGRAM(s) Oral every 6 hours PRN headache  diphenhydrAMINE   Capsule 25 milliGRAM(s) Oral two times a day PRN Rash and/or Itching      Vital Signs Last 24 Hrs  T(C): 36.9 (16 Sep 2017 05:04), Max: 37 (15 Sep 2017 14:27)  T(F): 98.4 (16 Sep 2017 05:04), Max: 98.6 (15 Sep 2017 14:27)  HR: 78 (16 Sep 2017 05:04) (78 - 88)  BP: 112/71 (16 Sep 2017 05:04) (112/71 - 123/79)  BP(mean): --  RR: 18 (16 Sep 2017 05:04) (18 - 20)  SpO2: 96% (16 Sep 2017 05:04) (96% - 98%)  CAPILLARY BLOOD GLUCOSE        I&O's Summary    15 Sep 2017 07:01  -  16 Sep 2017 07:00  --------------------------------------------------------  IN: 1540 mL / OUT: 240 mL / NET: 1300 mL        REVIEW OF SYSTEMS    General: No fevers/chills  Skin/Breast: No rash  Ophthalmologic: No vision changes  ENMT:No dysphagia or odynophagia  Respiratory and Thorax: No SOB, wheezing, cough  Cardiovascular: No chest pain or palpitations  Gastrointestinal: No n/v/d, No melena, No Hematochezia  Genitourinary:  No dysuria, No change in urinary frequency  Musculoskeletal: Mild improved R forearm pain and swelling  Neurological: No focal deficits, No headache    PHYSICAL EXAM:  GENERAL: NAD, well-developed  HEAD:  Atraumatic, Normocephalic  EYES: EOMI, PERRLA, conjunctiva and sclera clear  NECK: Supple, No JVD  CHEST/LUNG: Clear to auscultation bilaterally; No wheeze  HEART: Regular rate and rhythm; No murmurs, rubs, or gallops  ABDOMEN: Soft, Nontender, Nondistended; Bowel sounds present. PD catheter noted, C/D/I  EXTREMITIES: Pulses intact. R forearm mild swelling and TTP. No clubbing, cyanosis.  PSYCH: AAOx3  NEUROLOGY: non-focal  SKIN: No rashes or lesions    LABS:                        7.8    13.57 )-----------( 475      ( 16 Sep 2017 08:08 )             25.9     09-16    135  |  100  |  48<H>  ----------------------------<  120<H>  5.2   |  16<L>  |  5.01<H>    Ca    7.7<L>      16 Sep 2017 08:01  Phos  5.7     09-16  Mg     2.1     09-16    TPro  6.7  /  Alb  2.8<L>  /  TBili  0.9  /  DBili  x   /  AST  14  /  ALT  6<L>  /  AlkPhos  163<H>  09-16

## 2017-09-16 NOTE — PROGRESS NOTE ADULT - PROBLEM SELECTOR PLAN 3
No further PD due to predisposition to hemoperitoneum. Hyperkalemic this admission managed thusfar with medical management with albuterol/insulin/D50/lasix. K 5.2. Bicarb 16.   - f/u Vascular for AVF placement  - Monitor BMP  - Consider shiley placement for urgent HD if electrolyte abnormalities refractory to medical management  - f/u Renal Recs  - c/w Bicarb No further PD due to predisposition to hemoperitoneum. Hyperkalemic this admission managed thusfar with medical management with albuterol/insulin/D50/lasix. K 5.2. Bicarb 16.   - f/u Vascular for AVF placement  - Monitor BMP  - Consider shiley placement for urgent HD if electrolyte abnormalities refractory to medical management  - f/u Renal Recs  - c/w Bicarb  - Start Phoslo for rising P  - Lasix 40mg IV x1 given rising K

## 2017-09-16 NOTE — PROGRESS NOTE ADULT - PROBLEM SELECTOR PLAN 1
Improved, likely 2/2 peritonitis vs rupture of small peripancreatic varices/collateral venous mesenteric vessels/hematomas seen on CT.  Repeat CT with mildly enlarged hematoma, but overall improvement of hemoperitoneum.  - Plan for removal of PD cath on Monday  - c/w daily CBC  - Monitor off abx as per ID   - c/w bowel regimen. Avoid enemas given history of ESRD (concern for phosphorous absorption)  - Monitor abdominal exam

## 2017-09-17 LAB
ALBUMIN SERPL ELPH-MCNC: 2.9 G/DL — LOW (ref 3.3–5)
ALP SERPL-CCNC: 150 U/L — HIGH (ref 40–120)
ALT FLD-CCNC: 6 U/L — LOW (ref 10–45)
ANION GAP SERPL CALC-SCNC: 17 MMOL/L — SIGNIFICANT CHANGE UP (ref 5–17)
APTT BLD: 37.2 SEC — SIGNIFICANT CHANGE UP (ref 27.5–37.4)
AST SERPL-CCNC: 18 U/L — SIGNIFICANT CHANGE UP (ref 10–40)
BILIRUB SERPL-MCNC: 1 MG/DL — SIGNIFICANT CHANGE UP (ref 0.2–1.2)
BLD GP AB SCN SERPL QL: NEGATIVE — SIGNIFICANT CHANGE UP
BUN SERPL-MCNC: 51 MG/DL — HIGH (ref 7–23)
CALCIUM SERPL-MCNC: 8.4 MG/DL — SIGNIFICANT CHANGE UP (ref 8.4–10.5)
CHLORIDE SERPL-SCNC: 101 MMOL/L — SIGNIFICANT CHANGE UP (ref 96–108)
CO2 SERPL-SCNC: 17 MMOL/L — LOW (ref 22–31)
CREAT SERPL-MCNC: 4.91 MG/DL — HIGH (ref 0.5–1.3)
GLUCOSE SERPL-MCNC: 88 MG/DL — SIGNIFICANT CHANGE UP (ref 70–99)
HCG UR QL: NEGATIVE — SIGNIFICANT CHANGE UP
HCT VFR BLD CALC: 27.7 % — LOW (ref 34.5–45)
HGB BLD-MCNC: 8.2 G/DL — LOW (ref 11.5–15.5)
INR BLD: 1.18 RATIO — HIGH (ref 0.88–1.16)
MAGNESIUM SERPL-MCNC: 2.3 MG/DL — SIGNIFICANT CHANGE UP (ref 1.6–2.6)
MCHC RBC-ENTMCNC: 22 PG — LOW (ref 27–34)
MCHC RBC-ENTMCNC: 29.6 GM/DL — LOW (ref 32–36)
MCV RBC AUTO: 74.3 FL — LOW (ref 80–100)
PHOSPHATE SERPL-MCNC: 5.9 MG/DL — HIGH (ref 2.5–4.5)
PLATELET # BLD AUTO: 485 K/UL — HIGH (ref 150–400)
POTASSIUM SERPL-MCNC: 5.3 MMOL/L — SIGNIFICANT CHANGE UP (ref 3.5–5.3)
POTASSIUM SERPL-SCNC: 5.3 MMOL/L — SIGNIFICANT CHANGE UP (ref 3.5–5.3)
PROT SERPL-MCNC: 6.7 G/DL — SIGNIFICANT CHANGE UP (ref 6–8.3)
PROTHROM AB SERPL-ACNC: 13.4 SEC — HIGH (ref 10–13.1)
RBC # BLD: 3.73 M/UL — LOW (ref 3.8–5.2)
RBC # FLD: 21.2 % — HIGH (ref 10.3–14.5)
RH IG SCN BLD-IMP: POSITIVE — SIGNIFICANT CHANGE UP
SODIUM SERPL-SCNC: 135 MMOL/L — SIGNIFICANT CHANGE UP (ref 135–145)
WBC # BLD: 12.66 K/UL — HIGH (ref 3.8–10.5)
WBC # FLD AUTO: 12.66 K/UL — HIGH (ref 3.8–10.5)

## 2017-09-17 PROCEDURE — 99233 SBSQ HOSP IP/OBS HIGH 50: CPT | Mod: GC

## 2017-09-17 PROCEDURE — 71010: CPT | Mod: 26

## 2017-09-17 PROCEDURE — 93010 ELECTROCARDIOGRAM REPORT: CPT

## 2017-09-17 RX ORDER — FUROSEMIDE 40 MG
60 TABLET ORAL ONCE
Qty: 0 | Refills: 0 | Status: COMPLETED | OUTPATIENT
Start: 2017-09-17 | End: 2017-09-17

## 2017-09-17 RX ORDER — SODIUM CHLORIDE 9 MG/ML
1000 INJECTION INTRAMUSCULAR; INTRAVENOUS; SUBCUTANEOUS
Qty: 0 | Refills: 0 | Status: DISCONTINUED | OUTPATIENT
Start: 2017-09-17 | End: 2017-09-18

## 2017-09-17 RX ADMIN — Medication 60 MILLIGRAM(S): at 11:47

## 2017-09-17 RX ADMIN — Medication 100 MILLIGRAM(S): at 05:43

## 2017-09-17 RX ADMIN — SIMETHICONE 80 MILLIGRAM(S): 80 TABLET, CHEWABLE ORAL at 17:55

## 2017-09-17 RX ADMIN — Medication 650 MILLIGRAM(S): at 05:42

## 2017-09-17 RX ADMIN — SODIUM CHLORIDE 500 MILLILITER(S): 9 INJECTION INTRAMUSCULAR; INTRAVENOUS; SUBCUTANEOUS at 11:47

## 2017-09-17 RX ADMIN — Medication 667 MILLIGRAM(S): at 17:56

## 2017-09-17 RX ADMIN — Medication 667 MILLIGRAM(S): at 11:49

## 2017-09-17 RX ADMIN — Medication 650 MILLIGRAM(S): at 11:48

## 2017-09-17 RX ADMIN — Medication 100 MILLIGRAM(S): at 17:56

## 2017-09-17 RX ADMIN — Medication 667 MILLIGRAM(S): at 08:49

## 2017-09-17 RX ADMIN — LACTULOSE 5 GRAM(S): 10 SOLUTION ORAL at 11:48

## 2017-09-17 RX ADMIN — Medication 650 MILLIGRAM(S): at 17:55

## 2017-09-17 NOTE — PROGRESS NOTE ADULT - PROBLEM SELECTOR PLAN 6
Hb stable  - Continue to hold hydroxyurea as can worsen anemia. Per outpatient records was held due to her worsening renal function.   - trend CBC DVT ppx  - SCDs and encourage ambulation due to recent bleeding    f/u SW - emergency medicaid. Goal to facilitate medical workup inpt given insurance issues    Trav Domingo, MS 4    Sara Zurita MD  PGY-3, Medicine Team 3  pager: [phone]

## 2017-09-17 NOTE — PROGRESS NOTE ADULT - PROBLEM SELECTOR PLAN 2
Possibly 2/2 traction of PD catheter with underlying varices vs. rupture smaller collateral vessel during PD due to extensive collateralization of venous mesenteric system seen on CT A/P. Hgb stable.  - Plan for removal of PD cath on Monday  - c/w daily CBC

## 2017-09-17 NOTE — PROGRESS NOTE ADULT - PROBLEM SELECTOR PLAN 1
Improved, likely 2/2 peritonitis due to rupture of small peripancreatic varices/collateral venous mesenteric vessels/hematomas seen on CT.  Repeat CT with mildly enlarged hematoma, but overall improvement of hemoperitoneum.  - Plan for removal of PD cath on Monday  - c/w daily CBC  - Monitor off abx as per ID   - c/w bowel regimen. Avoid enemas given history of ESRD (concern for phosphorous absorption)  - Monitor abdominal exam Resolved, likely 2/2 peritonitis due to rupture of small peripancreatic varices/collateral venous mesenteric vessels/hematomas seen on CT.  Repeat CT with mildly enlarged hematoma, but overall improvement of hemoperitoneum.  - Plan for removal of PD cath on Monday  - c/w daily CBC  - Monitor off abx as per ID   - c/w bowel regimen. Avoid enemas given history of ESRD (concern for phosphorous absorption)

## 2017-09-17 NOTE — PROGRESS NOTE ADULT - PROBLEM SELECTOR PLAN 4
Hb stable  - Monitor CBC  - Transfuse for hg <7.0 or if symptomatic. Relative hypotension, labetalol/HCTZ/amlodipine currently held, patient also received lasix for hyperkalemia leading to decrease BP  - Holding BP meds as patient's BP has been on low end of normal

## 2017-09-17 NOTE — PROGRESS NOTE ADULT - PROBLEM SELECTOR PLAN 3
No further PD due to predisposition to hemoperitoneum. Hyperkalemic this admission managed medically thus far with albuterol/insulin/D50/lasix. K 5.2. Bicarb 16.   - f/u Vascular for AVF placement  - Monitor BMP  - Consider shiley placement for urgent HD if electrolyte abnormalities refractory to medical management  - f/u Renal Recs  - c/w Bicarb  - c/w phoslo for rising P No further PD due to predisposition to hemoperitoneum.  Hyperkalemic this admission managed medically thus far with albuterol/insulin/D50/lasix. K 5.2. Bicarb 16.   - plan for AVF placement inpatient per Vascular, timing pending  - f/u Renal Recs  - c/w Bicarb  - c/w phoslo for rising phos   - Consider shiley placement for urgent HD if electrolyte abnormalities refractory to medical management

## 2017-09-17 NOTE — PROGRESS NOTE ADULT - ATTENDING COMMENTS
Agree w/ above. Lasix today to keep K+ in check, need be mindful of borderline hyperkalemia w/ anesthesia induction. NPO tonight for vascular and removal of PD catheter. She had pain to palpation in LLQ and RLQ on exam though was clinically well appearing and HD stable, likely constipation and MSK related. I asked her to let us know if it worsens. Clarify bowel regimen. Rest as above.

## 2017-09-17 NOTE — PROGRESS NOTE ADULT - ASSESSMENT
57y year old Female who presents with hemoperitoneum, patient had ESRD with peritoneal dialysis catheter placed in August, presented to OSH with bleeding in abdomen, transferred to [facility] for further management, now with stable H/H.     - CBC stable, continue to monitor  - pre-op labs, EKG, and CXR ordered   - NPO pmn  - booked and consented for PD catheter removal tomorrow       Leatha Zayas PGY 2  [id number] 57y year old Female who presents with hemoperitoneum, patient had ESRD with peritoneal dialysis catheter placed in August, presented to OSH with bleeding in abdomen, transferred to [facility] for further management, now with stable H/H.     - CBC stable, continue to monitor  - pre-op labs, EKG, and CXR ordered   - NPO pmn  - booked and consented for PD catheter removal tomorrow       [facility]  2210 57y year old Female who presents with hemoperitoneum, patient had ESRD with peritoneal dialysis catheter placed in August, presented to OSH with bleeding in abdomen, transferred to [facility] for further management, now with stable H/H.     - CBC stable, continue to monitor  - pre-op labs, EKG, and CXR performed   - NPO pmn  - booked and consented for PD catheter removal tomorrow       [facility]  [id number]

## 2017-09-17 NOTE — PROGRESS NOTE ADULT - SUBJECTIVE AND OBJECTIVE BOX
Team Surgery Preop Note    Patient is a 57y old  Female who presents with a chief complaint of Peritonitis, Hemoperitoneum (03 Sep 2017 20:09)    Diagnosis: 	hemoperitoneum s/p peritoneal dialysis catheter placement   Procedure: 	removal of PD catheter   Surgeon: 	Héctor                          8.2    12.66 )-----------( 485      ( 17 Sep 2017 08:30 )             27.7     09-17    135  |  101  |  51<H>  ----------------------------<  88  5.3   |  17<L>  |  4.91<H>    Ca    8.4      17 Sep 2017 08:28  Phos  5.9     09-17  Mg     2.3     09-17    TPro  6.7  /  Alb  2.9<L>  /  TBili  1.0  /  DBili  x   /  AST  18  /  ALT  6<L>  /  AlkPhos  150<H>  09-17    PT/INR - ( 17 Sep 2017 08:30 )   PT: 13.4 sec;   INR: 1.18 ratio         PTT - ( 17 Sep 2017 08:30 )  PTT:37.2 sec      [x] Type & Screen  [x] CBC  [x] BMP  [x] PT/PTT/INR  [ ] Urinalysis  [x ] Chest X-ray  [x ] EKG  [x ] NPO/IVF  [x ] Consent  [ ] Clearance  [x ] Added on to OR Schedule  [ ] Anti-coagulation held  [ ] MRSA/MSSA Nasal Screen Team Surgery Preop Note    Patient is a 57y old  Female who presents with a chief complaint of Peritonitis, Hemoperitoneum (03 Sep 2017 20:09)    Diagnosis: 	hemoperitoneum s/p peritoneal dialysis catheter placement   Procedure: 	removal of PD catheter   Surgeon: 	Héctor                          8.2    12.66 )-----------( 485      ( 17 Sep 2017 08:30 )             27.7     09-17    135  |  101  |  51<H>  ----------------------------<  88  5.3   |  17<L>  |  4.91<H>    Ca    8.4      17 Sep 2017 08:28  Phos  5.9     09-17  Mg     2.3     09-17    TPro  6.7  /  Alb  2.9<L>  /  TBili  1.0  /  DBili  x   /  AST  18  /  ALT  6<L>  /  AlkPhos  150<H>  09-17    PT/INR - ( 17 Sep 2017 08:30 )   PT: 13.4 sec;   INR: 1.18 ratio         PTT - ( 17 Sep 2017 08:30 )  PTT:37.2 sec      [x] Type & Screen  [x] CBC  [x] BMP  [x] PT/PTT/INR  [ ] Urinalysis  [x ] Chest X-ray - clear lungs   [x ] EKG - normal sinus rhythm   [x ] NPO/IVF  [x ] Consent  [ ] Clearance  [x ] Added on to OR Schedule  [ ] Anti-coagulation held  [ ] MRSA/MSSA Nasal Screen [facility] Team Surgery Preop Note    Patient is a 57y old  Female who presents with a chief complaint of Peritonitis, Hemoperitoneum (03 Sep 2017 20:09)    Diagnosis: 	hemoperitoneum s/p peritoneal dialysis catheter placement   Procedure: 	removal of PD catheter   Surgeon: 	Héctor                          8.2    12.66 )-----------( 485      ( 17 Sep 2017 08:30 )             27.7     09-17    135  |  101  |  51<H>  ----------------------------<  88  5.3   |  17<L>  |  4.91<H>    Ca    8.4      17 Sep 2017 08:28  Phos  5.9     09-17  Mg     2.3     09-17    TPro  6.7  /  Alb  2.9<L>  /  TBili  1.0  /  DBili  x   /  AST  18  /  ALT  6<L>  /  AlkPhos  150<H>  09-17    PT/INR - ( 17 Sep 2017 08:30 )   PT: 13.4 sec;   INR: 1.18 ratio         PTT - ( 17 Sep 2017 08:30 )  PTT:37.2 sec      [x] Type & Screen  [x] CBC  [x] BMP  [x] PT/PTT/INR  [ ] Urinalysis  [x ] Chest X-ray - clear lungs   [x ] EKG - normal sinus rhythm   [x ] NPO/IVF  [x ] Consent  [ ] Clearance  [x ] Added on to OR Schedule  [ ] Anti-coagulation held  [ ] MRSA/MSSA Nasal Screen [facility] Team Surgery Preop Note    Patient is a 57y old  Female who presents with a chief complaint of Peritonitis, Hemoperitoneum (03 Sep 2017 20:09)    Diagnosis: 	ESRD, hemoperitoneum s/p peritoneal dialysis catheter placement   Procedure: 	removal of PD catheter   Surgeon: 	Héctor                          8.2    12.66 )-----------( 485      ( 17 Sep 2017 08:30 )             27.7     09-17    135  |  101  |  51<H>  ----------------------------<  88  5.3   |  17<L>  |  4.91<H>    Ca    8.4      17 Sep 2017 08:28  Phos  5.9     09-17  Mg     2.3     09-17    TPro  6.7  /  Alb  2.9<L>  /  TBili  1.0  /  DBili  x   /  AST  18  /  ALT  6<L>  /  AlkPhos  150<H>  09-17    PT/INR - ( 17 Sep 2017 08:30 )   PT: 13.4 sec;   INR: 1.18 ratio         PTT - ( 17 Sep 2017 08:30 )  PTT:37.2 sec      [x] Type & Screen  [x] CBC  [x] BMP  [x] PT/PTT/INR  [ ] Urinalysis  [x ] Chest X-ray - clear lungs   [x ] EKG - normal sinus rhythm   [x ] NPO/IVF  [x ] Consent  [ ] Clearance  [x ] Added on to OR Schedule  [ ] Anti-coagulation held  [ ] MRSA/MSSA Nasal Screen

## 2017-09-17 NOTE — PROGRESS NOTE ADULT - SUBJECTIVE AND OBJECTIVE BOX
Trav Domingo, MS 4    Sara Zurita MD  PGY-3, Medicine Team 3  pager: [phone]      Patient is a 57y old  Female who presents with a chief complaint of Peritonitis, Hemoperitoneum (03 Sep 2017 20:09)      SUBJECTIVE / OVERNIGHT EVENTS: No acute events overnight. Pt has no complaints at this time. Seh denies any fevers, chills, chest pain/SOB, abd pain, n/v/d, swelling or rashes.    MEDICATIONS  (STANDING):  amLODIPine   Tablet 10 milliGRAM(s) Oral daily  docusate sodium 100 milliGRAM(s) Oral two times a day  sodium bicarbonate 650 milliGRAM(s) Oral four times a day  polyethylene glycol 3350 17 Gram(s) Oral daily  simethicone 80 milliGRAM(s) Chew two times a day  lactulose Syrup 5 Gram(s) Oral daily    MEDICATIONS  (PRN):  acetaminophen   Tablet. 650 milliGRAM(s) Oral every 6 hours PRN headache  diphenhydrAMINE   Capsule 25 milliGRAM(s) Oral two times a day PRN Rash and/or Itching    Vital Signs Last 24 Hrs  T(C): 36.7 (17 Sep 2017 05:13), Max: 37 (16 Sep 2017 13:19)  T(F): 98.1 (17 Sep 2017 05:13), Max: 98.6 (16 Sep 2017 13:19)  HR: 74 (17 Sep 2017 05:13) (74 - 91)  BP: 118/73 (17 Sep 2017 05:13) (108/74 - 118/73)  BP(mean): --  RR: 18 (17 Sep 2017 05:13) (18 - 20)  SpO2: 97% (17 Sep 2017 05:13) (97% - 98%)    I&O's Summary    16 Sep 2017 07:01  -  17 Sep 2017 07:00  --------------------------------------------------------  IN: 1340 mL / OUT: 0 mL / NET: 1340 mL    REVIEW OF SYSTEMS    General: No fevers/chills  Skin/Breast: No rash  Ophthalmologic: No vision changes  ENMT:No dysphagia or odynophagia  Respiratory and Thorax: No SOB, wheezing, cough  Cardiovascular: No chest pain or palpitations  Gastrointestinal: No n/v/d, No melena, No Hematochezia  Genitourinary:  No dysuria, No change in urinary frequency  Musculoskeletal: No arthralgias or myalgias  Neurological: No focal deficits, No headache    PHYSICAL EXAM:  GENERAL: NAD, well-developed  HEAD:  Atraumatic, Normocephalic  EYES: EOMI, PERRLA, conjunctiva and sclera clear  NECK: Supple, No JVD  CHEST/LUNG: Clear to auscultation bilaterally; No wheeze  HEART: Regular rate and rhythm; No murmurs, rubs, or gallops  ABDOMEN: Soft, Nontender, Nondistended; Bowel sounds present. PD catheter noted, C/D/I  EXTREMITIES: Pulses intact. No clubbing, cyanosis, or edema  PSYCH: AAOx3  NEUROLOGY: non-focal  SKIN: No rashes or lesions      LABS:                        7.8    13.57 )-----------( 475      ( 16 Sep 2017 08:08 )             25.9     09-16    135  |  100  |  48<H>  ----------------------------<  120<H>  5.2   |  16<L>  |  5.01<H>    Ca    7.7<L>      16 Sep 2017 08:01  Phos  5.7     09-16  Mg     2.1     09-16    TPro  6.7  /  Alb  2.8<L>  /  TBili  0.9  /  DBili  x   /  AST  14  /  ALT  6<L>  /  AlkPhos  163<H>  09-16 Trav Domingo, MS 4    Sara Zurita MD  PGY-3, Medicine Team 3  pager: [phone]      Patient is a 57y old  Female who presents with a chief complaint of Peritonitis, Hemoperitoneum (03 Sep 2017 20:09)      SUBJECTIVE / OVERNIGHT EVENTS:   No acute events overnight. Pt has no complaints at this time. Seh denies any fevers, chills, chest pain/SOB, abd pain, n/v/d, swelling or rashes.      Vital Signs Last 24 Hrs  T(C): 36.7 (17 Sep 2017 05:13), Max: 37 (16 Sep 2017 13:19)  T(F): 98.1 (17 Sep 2017 05:13), Max: 98.6 (16 Sep 2017 13:19)  HR: 74 (17 Sep 2017 05:13) (74 - 91)  BP: 118/73 (17 Sep 2017 05:13) (108/74 - 118/73)  RR: 18 (17 Sep 2017 05:13) (18 - 20)  SpO2: 97% (17 Sep 2017 05:13) (97% - 98%)      PHYSICAL EXAM:  GENERAL: NAD, well-developed  HEAD:  Atraumatic, Normocephalic  EYES: EOMI, PERRLA, conjunctiva and sclera clear  NECK: Supple, No JVD  CHEST/LUNG: Clear to auscultation bilaterally; No wheeze  HEART: Regular rate and rhythm; No murmurs, rubs, or gallops  ABDOMEN: Soft, Nontender, Nondistended; Bowel sounds present. PD catheter noted, C/D/I  EXTREMITIES: Pulses intact. No clubbing, cyanosis, or edema  PSYCH: AAOx3  NEUROLOGY: non-focal  SKIN: No rashes or lesions      LABS:                        7.8    13.57 )-----------( 475      ( 16 Sep 2017 08:08 )             25.9     09-16    135  |  100  |  48<H>  ----------------------------<  120<H>  5.2   |  16<L>  |  5.01<H>    Ca    7.7<L>      16 Sep 2017 08:01  Phos  5.7     09-16  Mg     2.1     09-16    TPro  6.7  /  Alb  2.8<L>  /  TBili  0.9  /  DBili  x   /  AST  14  /  ALT  6<L>  /  AlkPhos  163<H>  09-16        I&O's Summary    16 Sep 2017 07:01  -  17 Sep 2017 07:00  --------------------------------------------------------  IN: 1340 mL / OUT: 0 mL / NET: 1340 mL      MEDICATIONS  (STANDING):  amLODIPine   Tablet 10 milliGRAM(s) Oral daily  docusate sodium 100 milliGRAM(s) Oral two times a day  sodium bicarbonate 650 milliGRAM(s) Oral four times a day  polyethylene glycol 3350 17 Gram(s) Oral daily  simethicone 80 milliGRAM(s) Chew two times a day  lactulose Syrup 5 Gram(s) Oral daily    MEDICATIONS  (PRN):  acetaminophen   Tablet. 650 milliGRAM(s) Oral every 6 hours PRN headache  diphenhydrAMINE   Capsule 25 milliGRAM(s) Oral two times a day PRN Rash and/or Itching Trav Domingo, MS 4    Sara Zurita MD  PGY-3, Medicine Team 3  pager: [phone]      Patient is a 57y old  Female who presents with a chief complaint of Peritonitis, Hemoperitoneum (03 Sep 2017 20:09)      SUBJECTIVE / OVERNIGHT EVENTS:   No acute events overnight. Pt has no complaints at this time. Seh denies any fevers, chills, chest pain/SOB, abd pain, n/v/d, swelling or rashes.      Vital Signs Last 24 Hrs  T(C): 36.7 (17 Sep 2017 05:13), Max: 37 (16 Sep 2017 13:19)  T(F): 98.1 (17 Sep 2017 05:13), Max: 98.6 (16 Sep 2017 13:19)  HR: 74 (17 Sep 2017 05:13) (74 - 91)  BP: 118/73 (17 Sep 2017 05:13) (108/74 - 118/73)  RR: 18 (17 Sep 2017 05:13) (18 - 20)  SpO2: 97% (17 Sep 2017 05:13) (97% - 98%)      PHYSICAL EXAM:  GENERAL: NAD, well-developed  HEAD:  Atraumatic, Normocephalic  EYES: EOMI, PERRLA, conjunctiva and sclera clear  NECK: Supple, No JVD  CHEST/LUNG: Clear to auscultation bilaterally; No wheeze  HEART: Regular rate and rhythm; No murmurs, rubs, or gallops  ABDOMEN: Soft, Nontender, Nondistended; Bowel sounds present. PD catheter noted, C/D/I  EXTREMITIES: Pulses intact. No clubbing, cyanosis, or edema  PSYCH: AAOx3  NEUROLOGY: non-focal  SKIN: No rashes or lesions      LABS:                        8.2    12.66 )-----------( 485      ( 17 Sep 2017 08:30 )             27.7     09-17    135  |  101  |  51<H>  ----------------------------<  88  5.3   |  17<L>  |  4.91<H>    Ca    8.4      17 Sep 2017 08:28  Phos  5.9     09-17  Mg     2.3     09-17    TPro  6.7  /  Alb  2.9<L>  /  TBili  1.0  /  DBili  x   /  AST  18  /  ALT  6<L>  /  AlkPhos  150<H>  09-17    PT/INR - ( 17 Sep 2017 08:30 )   PT: 13.4 sec;   INR: 1.18 ratio         PTT - ( 17 Sep 2017 08:30 )  PTT:37.2 sec      I&O's Summary    16 Sep 2017 07:01  -  17 Sep 2017 07:00  --------------------------------------------------------  IN: 1340 mL / OUT: 0 mL / NET: 1340 mL      MEDICATIONS  (STANDING):  amLODIPine   Tablet 10 milliGRAM(s) Oral daily  docusate sodium 100 milliGRAM(s) Oral two times a day  sodium bicarbonate 650 milliGRAM(s) Oral four times a day  polyethylene glycol 3350 17 Gram(s) Oral daily  simethicone 80 milliGRAM(s) Chew two times a day  lactulose Syrup 5 Gram(s) Oral daily    MEDICATIONS  (PRN):  acetaminophen   Tablet. 650 milliGRAM(s) Oral every 6 hours PRN headache  diphenhydrAMINE   Capsule 25 milliGRAM(s) Oral two times a day PRN Rash and/or Itching

## 2017-09-17 NOTE — PROGRESS NOTE ADULT - ASSESSMENT
58yo F w/ HTN, polycythemia vera, ESRD on PD (2/2 chronic GN) p/w hemoperitoneum and symptomatic hemorrhagic anemia now pending HD setup with AVF placement and PD cath removal (on OR schedule for Monday 9/18).

## 2017-09-17 NOTE — PROGRESS NOTE ADULT - PROBLEM SELECTOR PLAN 7
DVT ppx  - SCDs and encourage ambulation due to recent bleeding    f/u SW - emergency medicaid. Goal to facilitate medical workup inpt given insurance issues    Trav Domingo, MS 4    Sara Zurita MD  PGY-3, Medicine Team 3  pager: [phone]

## 2017-09-17 NOTE — PROGRESS NOTE ADULT - ASSESSMENT
A/P: 57y Female planned for L AVF, possible Right    Pending T&S, urine pregnancy test and CXR- all ordered  NPO past midnight, except medications  IVF @20-30cc/hr as pt with renal failure  Pain/nausea control  AM labs  Consent in chart

## 2017-09-17 NOTE — PROGRESS NOTE ADULT - PROBLEM SELECTOR PLAN 5
Relative hypotension, labetalol/HCTZ/amlodipine currently held, patient also received lasix for hyperkalemia leading to decrease BP  - Holding BP meds as patient's BP has been on low end of normal Hb stable  - Continue to hold hydroxyurea as can worsen anemia. Per outpatient records was held due to her worsening renal function.   - trend CBC

## 2017-09-17 NOTE — PROGRESS NOTE ADULT - SUBJECTIVE AND OBJECTIVE BOX
PRE OPERATIVE NOTE    Pre-op Diagnosis: End Stage Renal Disease  Procedure: Left Arterio-Venous Fistula Creation, Possible Right  Surgeon: Roselyn                          8.2    12.66 )-----------( 485      ( 17 Sep 2017 08:30 )             27.7         135  |  101  |  51<H>  ----------------------------<  88  5.3   |  17<L>  |  4.91<H>    Ca    8.4      17 Sep 2017 08:28  Phos  5.9       Mg     2.3         TPro  6.7  /  Alb  2.9<L>  /  TBili  1.0  /  DBili  x   /  AST  18  /  ALT  6<L>  /  AlkPhos  150<H>      LIVER FUNCTIONS - ( 17 Sep 2017 08:28 )  Alb: 2.9 g/dL / Pro: 6.7 g/dL / ALK PHOS: 150 U/L / ALT: 6 U/L / AST: 18 U/L / GGT: x           PT/INR - ( 17 Sep 2017 08:30 )   PT: 13.4 sec;   INR: 1.18 ratio         PTT - ( 17 Sep 2017 08:30 )  PTT:37.2 sec    CAPILLARY BLOOD GLUCOSE          Type & Screen: : pending  CXR: : Pending  Urine Pregnancy: Pending ()  [id number]  Ventricular Rate 89 BPM    Atrial Rate 89 BPM    P-R Interval 146 ms    QRS Duration 82 ms     ms    QTc 423 ms    P Axis 39 degrees    R Axis 62 degrees    T Axis 50 degrees    Diagnosis Line NORMAL SINUS RHYTHM  NORMAL ECG  Echocardiogram: PRE OPERATIVE NOTE    Pre-op Diagnosis: End Stage Renal Disease  Procedure: Left Arterio-Venous Fistula Creation, Possible Right  Surgeon: Roselyn                          8.2    12.66 )-----------( 485      ( 17 Sep 2017 08:30 )             27.7         135  |  101  |  51<H>  ----------------------------<  88  5.3   |  17<L>  |  4.91<H>    Ca    8.4      17 Sep 2017 08:28  Phos  5.9       Mg     2.3         TPro  6.7  /  Alb  2.9<L>  /  TBili  1.0  /  DBili  x   /  AST  18  /  ALT  6<L>  /  AlkPhos  150<H>      LIVER FUNCTIONS - ( 17 Sep 2017 08:28 )  Alb: 2.9 g/dL / Pro: 6.7 g/dL / ALK PHOS: 150 U/L / ALT: 6 U/L / AST: 18 U/L / GGT: x           PT/INR - ( 17 Sep 2017 08:30 )   PT: 13.4 sec;   INR: 1.18 ratio         PTT - ( 17 Sep 2017 08:30 )  PTT:37.2 sec    CAPILLARY BLOOD GLUCOSE          Type & Screen: :   B+, Ab (-)  CXR: : Pending  Urine Pregnancy: : Negative  [email or web address]  Ventricular Rate 89 BPM    Atrial Rate 89 BPM    P-R Interval 146 ms    QRS Duration 82 ms     ms    QTc 423 ms    P Axis 39 degrees    R Axis 62 degrees    T Axis 50 degrees    Diagnosis Line NORMAL SINUS RHYTHM  NORMAL ECG

## 2017-09-18 ENCOUNTER — APPOINTMENT (OUTPATIENT)
Dept: VASCULAR SURGERY | Facility: HOSPITAL | Age: 57
End: 2017-09-18

## 2017-09-18 ENCOUNTER — TRANSCRIPTION ENCOUNTER (OUTPATIENT)
Age: 57
End: 2017-09-18

## 2017-09-18 LAB
ANION GAP SERPL CALC-SCNC: 18 MMOL/L — HIGH (ref 5–17)
ANION GAP SERPL CALC-SCNC: 18 MMOL/L — HIGH (ref 5–17)
ANION GAP SERPL CALC-SCNC: 20 MMOL/L — HIGH (ref 5–17)
APTT BLD: 36 SEC — SIGNIFICANT CHANGE UP (ref 27.5–37.4)
BASOPHILS # BLD AUTO: 0.21 K/UL — HIGH (ref 0–0.2)
BASOPHILS NFR BLD AUTO: 1.7 % — SIGNIFICANT CHANGE UP (ref 0–2)
BLASTS # FLD: 0 % — SIGNIFICANT CHANGE UP (ref 0–0)
BUN SERPL-MCNC: 48 MG/DL — HIGH (ref 7–23)
BUN SERPL-MCNC: 48 MG/DL — HIGH (ref 7–23)
BUN SERPL-MCNC: 52 MG/DL — HIGH (ref 7–23)
CALCIUM SERPL-MCNC: 7.8 MG/DL — LOW (ref 8.4–10.5)
CALCIUM SERPL-MCNC: 8.2 MG/DL — LOW (ref 8.4–10.5)
CALCIUM SERPL-MCNC: 8.3 MG/DL — LOW (ref 8.4–10.5)
CHLORIDE SERPL-SCNC: 102 MMOL/L — SIGNIFICANT CHANGE UP (ref 96–108)
CHLORIDE SERPL-SCNC: 103 MMOL/L — SIGNIFICANT CHANGE UP (ref 96–108)
CHLORIDE SERPL-SCNC: 103 MMOL/L — SIGNIFICANT CHANGE UP (ref 96–108)
CO2 SERPL-SCNC: 15 MMOL/L — LOW (ref 22–31)
CO2 SERPL-SCNC: 17 MMOL/L — LOW (ref 22–31)
CO2 SERPL-SCNC: 20 MMOL/L — LOW (ref 22–31)
CREAT SERPL-MCNC: 4.86 MG/DL — HIGH (ref 0.5–1.3)
CREAT SERPL-MCNC: 4.91 MG/DL — HIGH (ref 0.5–1.3)
CREAT SERPL-MCNC: 5.05 MG/DL — HIGH (ref 0.5–1.3)
EOSINOPHIL # BLD AUTO: 0 K/UL — SIGNIFICANT CHANGE UP (ref 0–0.5)
EOSINOPHIL NFR BLD AUTO: 0 % — SIGNIFICANT CHANGE UP (ref 0–6)
GLUCOSE SERPL-MCNC: 109 MG/DL — HIGH (ref 70–99)
GLUCOSE SERPL-MCNC: 160 MG/DL — HIGH (ref 70–99)
GLUCOSE SERPL-MCNC: 82 MG/DL — SIGNIFICANT CHANGE UP (ref 70–99)
HCT VFR BLD CALC: 28.8 % — LOW (ref 34.5–45)
HGB BLD-MCNC: 8.4 G/DL — LOW (ref 11.5–15.5)
INR BLD: 1.2 RATIO — HIGH (ref 0.88–1.16)
LYMPHOCYTES # BLD AUTO: 0.64 K/UL — LOW (ref 1–3.3)
LYMPHOCYTES # BLD AUTO: 5.2 % — LOW (ref 13–44)
LYMPHOCYTES # SPEC AUTO: 0 % — SIGNIFICANT CHANGE UP (ref 0–0)
MAGNESIUM SERPL-MCNC: 2.2 MG/DL — SIGNIFICANT CHANGE UP (ref 1.6–2.6)
MANUAL SMEAR VERIFICATION: SIGNIFICANT CHANGE UP
MCHC RBC-ENTMCNC: 22 PG — LOW (ref 27–34)
MCHC RBC-ENTMCNC: 29.2 GM/DL — LOW (ref 32–36)
MCV RBC AUTO: 75.6 FL — LOW (ref 80–100)
METAMYELOCYTES # FLD: 0 % — SIGNIFICANT CHANGE UP (ref 0–0)
MONOCYTES # BLD AUTO: 0.21 K/UL — SIGNIFICANT CHANGE UP (ref 0–0.9)
MONOCYTES NFR BLD AUTO: 1.7 % — LOW (ref 2–14)
MYELOCYTES NFR BLD: 0 % — SIGNIFICANT CHANGE UP (ref 0–0)
NEUTROPHILS # BLD AUTO: 11.26 K/UL — HIGH (ref 1.8–7.4)
NEUTROPHILS NFR BLD AUTO: 91.4 % — HIGH (ref 43–77)
NEUTS BAND # BLD: 0 % — SIGNIFICANT CHANGE UP (ref 0–8)
PHOSPHATE SERPL-MCNC: 5.8 MG/DL — HIGH (ref 2.5–4.5)
PLAT MORPH BLD: NORMAL — SIGNIFICANT CHANGE UP
PLATELET # BLD AUTO: 479 K/UL — HIGH (ref 150–400)
PLATELET COUNT - ESTIMATE: SIGNIFICANT CHANGE UP
POTASSIUM SERPL-MCNC: 4.7 MMOL/L — SIGNIFICANT CHANGE UP (ref 3.5–5.3)
POTASSIUM SERPL-MCNC: 5.5 MMOL/L — HIGH (ref 3.5–5.3)
POTASSIUM SERPL-MCNC: 6.1 MMOL/L — HIGH (ref 3.5–5.3)
POTASSIUM SERPL-SCNC: 4.7 MMOL/L — SIGNIFICANT CHANGE UP (ref 3.5–5.3)
POTASSIUM SERPL-SCNC: 5.5 MMOL/L — HIGH (ref 3.5–5.3)
POTASSIUM SERPL-SCNC: 6.1 MMOL/L — HIGH (ref 3.5–5.3)
PROMYELOCYTES # FLD: 0 % — SIGNIFICANT CHANGE UP (ref 0–0)
PROTHROM AB SERPL-ACNC: 13.6 SEC — HIGH (ref 10–13.1)
RBC # BLD: 3.81 M/UL — SIGNIFICANT CHANGE UP (ref 3.8–5.2)
RBC # FLD: 21.4 % — HIGH (ref 10.3–14.5)
RBC BLD AUTO: SIGNIFICANT CHANGE UP
SODIUM SERPL-SCNC: 137 MMOL/L — SIGNIFICANT CHANGE UP (ref 135–145)
SODIUM SERPL-SCNC: 138 MMOL/L — SIGNIFICANT CHANGE UP (ref 135–145)
SODIUM SERPL-SCNC: 141 MMOL/L — SIGNIFICANT CHANGE UP (ref 135–145)
WBC # BLD: 12.32 K/UL — HIGH (ref 3.8–10.5)
WBC # FLD AUTO: 12.32 K/UL — HIGH (ref 3.8–10.5)

## 2017-09-18 PROCEDURE — 99233 SBSQ HOSP IP/OBS HIGH 50: CPT | Mod: GC

## 2017-09-18 PROCEDURE — 99232 SBSQ HOSP IP/OBS MODERATE 35: CPT

## 2017-09-18 RX ORDER — DEXTROSE 50 % IN WATER 50 %
50 SYRINGE (ML) INTRAVENOUS ONCE
Qty: 0 | Refills: 0 | Status: COMPLETED | OUTPATIENT
Start: 2017-09-18 | End: 2017-09-19

## 2017-09-18 RX ORDER — ALBUTEROL 90 UG/1
2.5 AEROSOL, METERED ORAL ONCE
Qty: 0 | Refills: 0 | Status: COMPLETED | OUTPATIENT
Start: 2017-09-18 | End: 2017-09-18

## 2017-09-18 RX ORDER — ONDANSETRON 8 MG/1
4 TABLET, FILM COATED ORAL ONCE
Qty: 0 | Refills: 0 | Status: DISCONTINUED | OUTPATIENT
Start: 2017-09-18 | End: 2017-09-18

## 2017-09-18 RX ORDER — HYDROMORPHONE HYDROCHLORIDE 2 MG/ML
0.25 INJECTION INTRAMUSCULAR; INTRAVENOUS; SUBCUTANEOUS
Qty: 0 | Refills: 0 | Status: DISCONTINUED | OUTPATIENT
Start: 2017-09-18 | End: 2017-09-18

## 2017-09-18 RX ORDER — FUROSEMIDE 40 MG
60 TABLET ORAL ONCE
Qty: 0 | Refills: 0 | Status: COMPLETED | OUTPATIENT
Start: 2017-09-18 | End: 2017-09-18

## 2017-09-18 RX ORDER — ALBUTEROL 90 UG/1
2.5 AEROSOL, METERED ORAL ONCE
Qty: 0 | Refills: 0 | Status: DISCONTINUED | OUTPATIENT
Start: 2017-09-18 | End: 2017-09-19

## 2017-09-18 RX ORDER — POLYETHYLENE GLYCOL 3350 17 G/17G
17 POWDER, FOR SOLUTION ORAL DAILY
Qty: 0 | Refills: 0 | Status: DISCONTINUED | OUTPATIENT
Start: 2017-09-18 | End: 2017-09-19

## 2017-09-18 RX ORDER — FUROSEMIDE 40 MG
60 TABLET ORAL ONCE
Qty: 0 | Refills: 0 | Status: COMPLETED | OUTPATIENT
Start: 2017-09-18 | End: 2017-09-19

## 2017-09-18 RX ORDER — CALCIUM ACETATE 667 MG
667 TABLET ORAL
Qty: 0 | Refills: 0 | Status: DISCONTINUED | OUTPATIENT
Start: 2017-09-18 | End: 2017-09-20

## 2017-09-18 RX ORDER — SODIUM CHLORIDE 9 MG/ML
500 INJECTION INTRAMUSCULAR; INTRAVENOUS; SUBCUTANEOUS ONCE
Qty: 0 | Refills: 0 | Status: COMPLETED | OUTPATIENT
Start: 2017-09-18 | End: 2017-09-18

## 2017-09-18 RX ORDER — DOCUSATE SODIUM 100 MG
100 CAPSULE ORAL
Qty: 0 | Refills: 0 | Status: DISCONTINUED | OUTPATIENT
Start: 2017-09-18 | End: 2017-09-21

## 2017-09-18 RX ORDER — DEXTROSE 50 % IN WATER 50 %
50 SYRINGE (ML) INTRAVENOUS ONCE
Qty: 0 | Refills: 0 | Status: COMPLETED | OUTPATIENT
Start: 2017-09-18 | End: 2017-09-18

## 2017-09-18 RX ORDER — LACTULOSE 10 G/15ML
5 SOLUTION ORAL DAILY
Qty: 0 | Refills: 0 | Status: DISCONTINUED | OUTPATIENT
Start: 2017-09-18 | End: 2017-09-21

## 2017-09-18 RX ORDER — HYDROMORPHONE HYDROCHLORIDE 2 MG/ML
0.5 INJECTION INTRAMUSCULAR; INTRAVENOUS; SUBCUTANEOUS ONCE
Qty: 0 | Refills: 0 | Status: DISCONTINUED | OUTPATIENT
Start: 2017-09-18 | End: 2017-09-18

## 2017-09-18 RX ORDER — SIMETHICONE 80 MG/1
80 TABLET, CHEWABLE ORAL
Qty: 0 | Refills: 0 | Status: DISCONTINUED | OUTPATIENT
Start: 2017-09-18 | End: 2017-09-21

## 2017-09-18 RX ORDER — INSULIN HUMAN 100 [IU]/ML
5 INJECTION, SOLUTION SUBCUTANEOUS ONCE
Qty: 0 | Refills: 0 | Status: COMPLETED | OUTPATIENT
Start: 2017-09-18 | End: 2017-09-18

## 2017-09-18 RX ORDER — SODIUM BICARBONATE 1 MEQ/ML
650 SYRINGE (ML) INTRAVENOUS
Qty: 0 | Refills: 0 | Status: DISCONTINUED | OUTPATIENT
Start: 2017-09-18 | End: 2017-09-19

## 2017-09-18 RX ADMIN — SODIUM CHLORIDE 500 MILLILITER(S): 9 INJECTION INTRAMUSCULAR; INTRAVENOUS; SUBCUTANEOUS at 10:41

## 2017-09-18 RX ADMIN — Medication 100 MILLIGRAM(S): at 05:50

## 2017-09-18 RX ADMIN — LACTULOSE 5 GRAM(S): 10 SOLUTION ORAL at 11:51

## 2017-09-18 RX ADMIN — HYDROMORPHONE HYDROCHLORIDE 0.25 MILLIGRAM(S): 2 INJECTION INTRAMUSCULAR; INTRAVENOUS; SUBCUTANEOUS at 20:50

## 2017-09-18 RX ADMIN — HYDROMORPHONE HYDROCHLORIDE 0.5 MILLIGRAM(S): 2 INJECTION INTRAMUSCULAR; INTRAVENOUS; SUBCUTANEOUS at 23:45

## 2017-09-18 RX ADMIN — Medication 50 MILLILITER(S): at 10:40

## 2017-09-18 RX ADMIN — Medication 650 MILLIGRAM(S): at 00:05

## 2017-09-18 RX ADMIN — SODIUM CHLORIDE 500 MILLILITER(S): 9 INJECTION INTRAMUSCULAR; INTRAVENOUS; SUBCUTANEOUS at 10:40

## 2017-09-18 RX ADMIN — Medication 650 MILLIGRAM(S): at 11:50

## 2017-09-18 RX ADMIN — Medication 60 MILLIGRAM(S): at 10:36

## 2017-09-18 RX ADMIN — Medication 650 MILLIGRAM(S): at 23:58

## 2017-09-18 RX ADMIN — HYDROMORPHONE HYDROCHLORIDE 0.25 MILLIGRAM(S): 2 INJECTION INTRAMUSCULAR; INTRAVENOUS; SUBCUTANEOUS at 20:36

## 2017-09-18 RX ADMIN — Medication 650 MILLIGRAM(S): at 05:50

## 2017-09-18 RX ADMIN — ALBUTEROL 2.5 MILLIGRAM(S): 90 AEROSOL, METERED ORAL at 11:22

## 2017-09-18 RX ADMIN — INSULIN HUMAN 5 UNIT(S): 100 INJECTION, SOLUTION SUBCUTANEOUS at 10:40

## 2017-09-18 NOTE — BRIEF OPERATIVE NOTE - PROCEDURE
<<-----Click on this checkbox to enter Procedure Arteriovenous anastomosis by transposition of basilic vein  09/18/2017    Active  [id number]

## 2017-09-18 NOTE — PROGRESS NOTE ADULT - SUBJECTIVE AND OBJECTIVE BOX
Trav Domingo, MS 4    Sara Zurita MD  PGY-3, Medicine Team 3  pager: [phone]    Patient is a 57y old  Female who presents with a chief complaint of Peritonitis, Hemoperitoneum (03 Sep 2017 20:09)      SUBJECTIVE / OVERNIGHT EVENTS:   No acute events overnight. Pt has no concerns at this time. She denies any fevers, chills, chest pain/SOB, abd pain, n/v/d, swelling or rashes.      Vital Signs Last 24 Hrs  T(C): 36.9 (18 Sep 2017 05:10), Max: 36.9 (18 Sep 2017 05:10)  T(F): 98.4 (18 Sep 2017 05:10), Max: 98.4 (18 Sep 2017 05:10)  HR: 79 (18 Sep 2017 05:10) (71 - 89)  BP: 122/85 (18 Sep 2017 05:10) (111/69 - 133/77)  BP(mean): --  RR: 18 (18 Sep 2017 05:10) (18 - 20)  SpO2: 95% (18 Sep 2017 05:10) (95% - 98%)    PHYSICAL EXAM:  GENERAL: NAD, well-developed  HEAD:  Atraumatic, Normocephalic  EYES: EOMI, PERRLA, conjunctiva and sclera clear  NECK: Supple, No JVD  CHEST/LUNG: Clear to auscultation bilaterally; No wheeze  HEART: Regular rate and rhythm; No murmurs, rubs, or gallops  ABDOMEN: Soft, Nontender, Nondistended; Bowel sounds present. PD catheter noted, C/D/I  EXTREMITIES: Pulses intact. No clubbing, cyanosis, or edema  PSYCH: AAOx3  NEUROLOGY: non-focal  SKIN: No rashes or lesions      LABS:                        8.2    12.66 )-----------( 485      ( 17 Sep 2017 08:30 )             27.7     09-17    135  |  101  |  51<H>  ----------------------------<  88  5.3   |  17<L>  |  4.91<H>    Ca    8.4      17 Sep 2017 08:28  Phos  5.9     09-17  Mg     2.3     09-17    TPro  6.7  /  Alb  2.9<L>  /  TBili  1.0  /  DBili  x   /  AST  18  /  ALT  6<L>  /  AlkPhos  150<H>  09-17    PT/INR - ( 17 Sep 2017 08:30 )   PT: 13.4 sec;   INR: 1.18 ratio         PTT - ( 17 Sep 2017 08:30 )  PTT:37.2 sec      I&O's Summary    17 Sep 2017 07:01  -  18 Sep 2017 07:00  --------------------------------------------------------  IN: 1750 mL / OUT: 0 mL / NET: 1750 mL      MEDICATIONS  (STANDING):  docusate sodium 100 milliGRAM(s) Oral two times a day  sodium bicarbonate 650 milliGRAM(s) Oral four times a day  polyethylene glycol 3350 17 Gram(s) Oral daily  simethicone 80 milliGRAM(s) Chew two times a day  lactulose Syrup 5 Gram(s) Oral daily  calcium acetate 667 milliGRAM(s) Oral three times a day with meals  sodium chloride 0.9%. 1000 milliLiter(s) (500 mL/Hr) IV Continuous <Continuous>    MEDICATIONS  (PRN):  acetaminophen   Tablet. 650 milliGRAM(s) Oral every 6 hours PRN headache  diphenhydrAMINE   Capsule 25 milliGRAM(s) Oral two times a day PRN Rash and/or Itching Trav Domingo, MS 4    Sara Zurita MD  PGY-3, Medicine Team 3  pager: [phone]    Patient is a 57y old  Female who presents with a chief complaint of Peritonitis, Hemoperitoneum (03 Sep 2017 20:09)      SUBJECTIVE / OVERNIGHT EVENTS:   No acute events overnight. Pt has no concerns at this time. She denies any fevers, chills, chest pain/SOB, abd pain, n/v/d, swelling or rashes.      Vital Signs Last 24 Hrs  T(C): 36.9 (18 Sep 2017 05:10), Max: 36.9 (18 Sep 2017 05:10)  T(F): 98.4 (18 Sep 2017 05:10), Max: 98.4 (18 Sep 2017 05:10)  HR: 79 (18 Sep 2017 05:10) (71 - 89)  BP: 122/85 (18 Sep 2017 05:10) (111/69 - 133/77)  BP(mean): --  RR: 18 (18 Sep 2017 05:10) (18 - 20)  SpO2: 95% (18 Sep 2017 05:10) (95% - 98%)    PHYSICAL EXAM:  GENERAL: NAD, well-developed  HEAD:  Atraumatic, Normocephalic  EYES: EOMI, PERRLA, conjunctiva and sclera clear  NECK: Supple, No JVD  CHEST/LUNG: Clear to auscultation bilaterally; No wheeze  HEART: Regular rate and rhythm; No murmurs, rubs, or gallops  ABDOMEN: Soft, Nontender, Nondistended; Bowel sounds present. PD catheter noted, C/D/I  EXTREMITIES: Pulses intact. No clubbing, cyanosis, or edema  PSYCH: AAOx3  NEUROLOGY: non-focal  SKIN: No rashes or lesions      LABS:                         8.4    12.32 )-----------( 479      ( 18 Sep 2017 09:36 )             28.8     09-18    141  |  103  |  48<H>  ----------------------------<  109<H>  4.7   |  20<L>  |  4.91<H>    Ca    8.2<L>      18 Sep 2017 15:10  Phos  5.8     09-18  Mg     2.2     09-18    TPro  6.7  /  Alb  2.9<L>  /  TBili  1.0  /  DBili  x   /  AST  18  /  ALT  6<L>  /  AlkPhos  150<H>  09-17    PT/INR - ( 18 Sep 2017 09:36 )   PT: 13.6 sec;   INR: 1.20 ratio         PTT - ( 18 Sep 2017 09:36 )  PTT:36.0 sec    Labs reviewed remarkable for hyperk, improved s/p treatment. WBC decreased.      I&O's Summary    17 Sep 2017 07:01  -  18 Sep 2017 07:00  --------------------------------------------------------  IN: 1750 mL / OUT: 0 mL / NET: 1750 mL    MEDICATIONS  (STANDING):  docusate sodium 100 milliGRAM(s) Oral two times a day  sodium bicarbonate 650 milliGRAM(s) Oral four times a day  polyethylene glycol 3350 17 Gram(s) Oral daily  simethicone 80 milliGRAM(s) Chew two times a day  lactulose Syrup 5 Gram(s) Oral daily  calcium acetate 667 milliGRAM(s) Oral three times a day with meals  sodium chloride 0.9%. 1000 milliLiter(s) (500 mL/Hr) IV Continuous <Continuous>    MEDICATIONS  (PRN):  acetaminophen   Tablet. 650 milliGRAM(s) Oral every 6 hours PRN headache  diphenhydrAMINE   Capsule 25 milliGRAM(s) Oral two times a day PRN Rash and/or Itching

## 2017-09-18 NOTE — PROGRESS NOTE ADULT - PROBLEM SELECTOR PLAN 3
No further PD due to predisposition to hemoperitoneum.  Hyperkalemic this admission managed medically thus far with albuterol/insulin/D50/lasix. K 5.3. Bicarb 17.   - plan for AVF placement inpatient per Vascular, timing pending  - f/u Renal Recs  - c/w Bicarb  - c/w phoslo for rising phos   - Consider shiley placement for urgent HD if electrolyte abnormalities refractory to medical management No further PD due to predisposition to hemoperitoneum.  Multiple ep of Hyperkalemia this admission managed medically thus far with albuterol/insulin/D50/lasix.   - plan for AVF placement inpatient per Vascular, timing pending  - f/u Renal Recs  - c/w Bicarb  - c/w phoslo for rising phos   - Pending permacath placement per IR. Cleared from ID standpoint.

## 2017-09-18 NOTE — PROGRESS NOTE ADULT - SUBJECTIVE AND OBJECTIVE BOX
CC: F/U for ? Lei    Saw/spoke to patient. Patient well. Planned for removal of PD catheter today. States no fevers, no chills. Abd pain improving. No cough, no shortness of breath. Overall well.    Allergies  No Known Allergies    ANTIMICROBIALS:  Off    PE:    Vital Signs Last 24 Hrs  T(C): 36.7 (18 Sep 2017 10:30), Max: 36.9 (18 Sep 2017 05:10)  T(F): 98.1 (18 Sep 2017 10:30), Max: 98.4 (18 Sep 2017 05:10)  HR: 77 (18 Sep 2017 10:30) (71 - 89)  BP: 134/80 (18 Sep 2017 10:30) (111/69 - 134/80)  BP(mean): --  RR: 18 (18 Sep 2017 05:10) (18 - 20)  SpO2: 95% (18 Sep 2017 05:10) (95% - 98%)    Gen: AOx3, NAD, non-toxic, pleasant  CV: S1+S2 normal, no murmurs, nontachycardic  Resp: Clear bilat, no resp distress, no crackles/wheezes  Abd: Soft, nontender, +BS, PD cath in place  Ext: No LE edema, no wounds    LABS:                        8.4    12.32 )-----------( 479      ( 18 Sep 2017 09:36 )             28.8     09-18    137  |  102  |  52<H>  ----------------------------<  82  5.5<H>   |  17<L>  |  4.86<H>    Ca    8.3<L>      18 Sep 2017 09:20  Phos  5.8     09-18  Mg     2.2     09-18    TPro  6.7  /  Alb  2.9<L>  /  TBili  1.0  /  DBili  x   /  AST  18  /  ALT  6<L>  /  AlkPhos  150<H>  09-17    MICROBIOLOGY:    9/6 Perit CX NGTD  9/4 BCX x2 NGTD    RADIOLOGY:    9/17:    INTERPRETATION:     The heart is not enlarged.  The trachea is midline.  The mediastinum and zhang appear unremarkable.  The lungs are clear.  No pleural effusion is seen.  The visualized bony structures are intact.

## 2017-09-18 NOTE — BRIEF OPERATIVE NOTE - OPERATION/FINDINGS
Procedure: L AVF with basilic vein transposition; removal of PD catheter.    Findings: There was an audible bruit over the antecubital fossa.  A drain was left in the upper arm. A separate incision was made to remove the PD catheter.

## 2017-09-18 NOTE — PROGRESS NOTE ADULT - ASSESSMENT
56 yo F with ESRD on peritoneal dialysis, PUD, polycythemia vera with episode of abd pain, hemoperitoneum. Patient had high leukocytosis on presentation, had a low grade elevated temp to 100.3F. No overt fevers, no chills, does not appear toxic. OSH reportedly with intraperitoneal leukocytosis in setting of shauna bleeding. Culture from OSH not available. Does not appear to have evidence of bowel perf or other infectious source on CT scan. Overall have low suspicion for infectious peritonitis (suspect leukocytosis, low grade elevated temp, abd pain all 2/2 to hematoma), but difficult to determine as primary diagnostic test (intraperitoneal cell count) wound be confounded by bleeding. Since prior encounter--no fevers, no chills. WBC gradually trending down. No focal signs infection, appears non-toxic, doing well.  - Continue off abx  - PD catheter removal per primary team  - Trend WBCs  - No clear signs of infection. No fevers, no chills, improving leukocytosis. As long as status remains unchanged, reasonable to proceed with permacath.      Jose Luis Bui MD  Pager [phone]  After 5pm and on weekends call [phone] 56 yo F with ESRD on peritoneal dialysis, PUD, polycythemia vera with episode of abd pain, hemoperitoneum. Patient had high leukocytosis on presentation, had a low grade elevated temp to 100.3F. No overt fevers, no chills, does not appear toxic. OSH reportedly with intraperitoneal leukocytosis in setting of shauna bleeding. Culture from OSH not available. Does not appear to have evidence of bowel perf or other infectious source on CT scan. Overall have low suspicion for infectious peritonitis (suspect leukocytosis, low grade elevated temp, abd pain all 2/2 to hematoma), but difficult to determine as primary diagnostic test (intraperitoneal cell count) wound be confounded by bleeding. Since prior encounter--no fevers, no chills. WBC gradually trending down. No focal signs infection, appears non-toxic, doing well.  - Continue off abx  - PD catheter removal per primary team  - Trend WBCs  - No clear signs of infection. No fevers, no chills, improving leukocytosis. As long as status remains unchanged, reasonable to proceed with permacat  - Call with further questions or change in status      Jose Luis Bui MD  Pager [phone]  After 5pm and on weekends call [phone]

## 2017-09-18 NOTE — PROGRESS NOTE ADULT - PROBLEM SELECTOR PLAN 6
DVT ppx  - SCDs and encourage ambulation due to recent bleeding    f/u SW - emergency medicaid. Goal to facilitate medical workup inpt given insurance issues    Trav Domingo, MS 4    Sara Zurita MD  PGY-3, Medicine Team 3  pager: [phone] DVT ppx  - SCDs and encourage ambulation due to recent bleeding  - NPO for OR today    f/u SW - emergency medicaid. Goal to facilitate medical workup inpt given insurance issues    Trav Domingo, MS 4    Sara Zurita MD  PGY-3, Medicine Team 3  pager: [phone]

## 2017-09-18 NOTE — PROGRESS NOTE ADULT - ASSESSMENT
Patient is a 56 y/o F w/ ESRD on HD who presented to OSH with abdominal pain, found with significant hemoperitoneum likely from rupture of collateral mesenteric vessel, deemed that PD is contraindicated, pending transition to HD.

## 2017-09-18 NOTE — PROGRESS NOTE ADULT - SUBJECTIVE AND OBJECTIVE BOX
[facility] DIVISION OF KIDNEY DISEASES AND HYPERTENSION -- FOLLOW UP NOTE  --------------------------------------------------------------------------------  Chief Complaint:  ESRD previously on PD, will plan to transition to HD.    24 hour events/subjective:  Patient reports feeling well, has no complaints at this time.        PAST HISTORY  --------------------------------------------------------------------------------  No significant changes to PMH, PSH, FHx, SHx, unless otherwise noted    ALLERGIES & MEDICATIONS  --------------------------------------------------------------------------------  Allergies    No Known Allergies    Intolerances      Standing Inpatient Medications  docusate sodium 100 milliGRAM(s) Oral two times a day  sodium bicarbonate 650 milliGRAM(s) Oral four times a day  polyethylene glycol 3350 17 Gram(s) Oral daily  simethicone 80 milliGRAM(s) Chew two times a day  lactulose Syrup 5 Gram(s) Oral daily  calcium acetate 667 milliGRAM(s) Oral three times a day with meals  sodium chloride 0.9%. 1000 milliLiter(s) IV Continuous <Continuous>    PRN Inpatient Medications  acetaminophen   Tablet. 650 milliGRAM(s) Oral every 6 hours PRN  diphenhydrAMINE   Capsule 25 milliGRAM(s) Oral two times a day PRN      REVIEW OF SYSTEMS  --------------------------------------------------------------------------------  Gen: No fevers/chills  Skin: No rashes  Head/Eyes/Ears/Mouth: No headache  Respiratory: No dyspnea  CV: No chest pain  GI: No abdominal pain  : No dysuria  MSK: No edema  Neuro: No dizziness/lightheadedness    VITALS/PHYSICAL EXAM  --------------------------------------------------------------------------------  T(C): 36.7 (09-18-17 @ 10:30), Max: 36.9 (09-18-17 @ 05:10)  HR: 77 (09-18-17 @ 10:30) (71 - 89)  BP: 134/80 (09-18-17 @ 10:30) (111/69 - 134/80)  RR: 18 (09-18-17 @ 05:10) (18 - 20)  SpO2: 95% (09-18-17 @ 05:10) (95% - 98%)  Wt(kg): --        09-17-17 @ 07:01  -  09-18-17 @ 07:00  --------------------------------------------------------  IN: 1750 mL / OUT: 0 mL / NET: 1750 mL      Physical Exam:  	Gen: NAD, well-appearing  	HEENT: MMM  	Pulm: CTA B/L  	CV: RRR, S1S2; no rub  	Abd: +BS, soft, nontender/nondistended  	: No suprapubic tenderness  	UE:  no edema  	LE:  no edema  	Neuro: No focal deficits  	Psych: Normal affect and mood  	Skin: Warm  	Vascular access:  abdominal PD catheter in place    LABS/STUDIES  --------------------------------------------------------------------------------              8.4    12.32 >-----------<  479      [09-18-17 @ 09:36]              28.8     137  |  102  |  52  ----------------------------<  82      [09-18-17 @ 09:20]  5.5   |  17  |  4.86        Ca     8.3     [09-18-17 @ 09:20]      Mg     2.2     [09-18-17 @ 09:20]      Phos  5.8     [09-18-17 @ 09:20]    TPro  6.7  /  Alb  2.9  /  TBili  1.0  /  DBili  x   /  AST  18  /  ALT  6   /  AlkPhos  150  [09-17-17 @ 08:28]    PT/INR: PT 13.6 , INR 1.20       [09-18-17 @ 09:36]  PTT: 36.0       [09-18-17 @ 09:36]    Creatinine Trend:  SCr 4.86 [09-18 @ 09:20]  SCr 4.91 [09-17 @ 08:28]  SCr 5.01 [09-16 @ 08:01]  SCr 4.83 [09-15 @ 09:53]  SCr 5.15 [09-14 @ 16:29]

## 2017-09-18 NOTE — PROGRESS NOTE ADULT - PROBLEM SELECTOR PLAN 2
Possibly 2/2 traction of PD catheter with underlying varices vs. rupture smaller collateral vessel during PD due to extensive collateralization of venous mesenteric system seen on CT A/P. Hgb stable.  - OR today for PD cath removal  - c/w daily CBC

## 2017-09-18 NOTE — PROGRESS NOTE ADULT - PROBLEM SELECTOR PLAN 5
monitor calcium and phos.  would transition from phoslo (calcium acetate) to tums (calcium carbonate) 1000mg po TID with meals due to cost issues.

## 2017-09-18 NOTE — PROGRESS NOTE ADULT - PROBLEM SELECTOR PLAN 1
Resolved, likely 2/2 peritonitis due to rupture of small peripancreatic varices/collateral venous mesenteric vessels/hematomas seen on CT.  Repeat CT with mildly enlarged hematoma, but overall improvement of hemoperitoneum.  - OR today for PD cath removal  - c/w daily CBC  - Monitor off abx as per ID   - c/w bowel regimen. Avoid enemas given history of ESRD (concern for phosphorous absorption) Resolved, likely 2/2 peritonitis due to rupture of small peripancreatic varices/collateral venous mesenteric vessels/hematomas seen on CT.  Repeat CT with mildly enlarged hematoma, but overall improvement of hemoperitoneum.  - OR today for PD cath removal  - NPO  - c/w daily CBC  - Monitor off abx as per ID   - c/w bowel regimen. Avoid enemas given history of ESRD (concern for phosphorous absorption) Resolving, likely 2/2 peritonitis due to rupture of small peripancreatic varices/collateral venous mesenteric vessels/hematomas seen on CT.  Repeat CT with mildly enlarged hematoma, but overall improvement of hemoperitoneum.  - OR today for PD cath removal  - NPO  - c/w daily CBC  - Monitor off abx as per ID   - c/w bowel regimen. Avoid enemas given history of ESRD (concern for phosphorous absorption)

## 2017-09-18 NOTE — PROGRESS NOTE ADULT - ASSESSMENT
56yo F w/ HTN, polycythemia vera, ESRD on PD (2/2 chronic GN) p/w hemoperitoneum and symptomatic hemorrhagic anemia now pending HD setup with AVF placement and PD cath removal (on OR schedule for Monday 9/18).

## 2017-09-18 NOTE — PROGRESS NOTE ADULT - PROBLEM SELECTOR PLAN 2
PD discontinued due to hemoperitoneum.  Managing electrolytes and volume medically with ESRD level renal function.  Maintain strict renal restrictions.  PD catheter removal today, AV fistula placement today.  Contacted IR for permacath placement who requested ID clearance - note appreciated.  Would start HD inpatient due to recurrent hyperkalemia despite medical therapies.

## 2017-09-18 NOTE — PROGRESS NOTE ADULT - PROBLEM SELECTOR PLAN 4
Normotensive currently, labetalol/HCTZ/amlodipine currently held as patient hypotensive previously, patient also received lasix for hyperkalemia leading to decrease BP  - Holding BP meds as patient was hypotensive previously, and currently normotensive

## 2017-09-18 NOTE — CHART NOTE - NSCHARTNOTEFT_GEN_A_CORE
procedure: L AVF with basilic vein transposition, removal of PD catheter  POD 0    S: Pt seen and examined with daughters at bedside. Pt admits to soreness in LUE and abdominal pain. Denies cp, sob, fever, chills, ha, palpitations, n/v, flatus/bm.    O:  Vital Signs Last 24 Hrs  T(C): 36.9 (18 Sep 2017 22:25), Max: 36.9 (18 Sep 2017 05:10)  T(F): 98.4 (18 Sep 2017 22:25), Max: 98.4 (18 Sep 2017 05:10)  HR: 76 (18 Sep 2017 22:25) (70 - 79)  BP: 120/75 (18 Sep 2017 22:25) (119/72 - 139/73)  BP(mean): 97 (18 Sep 2017 21:00) (86 - 98)  RR: 16 (18 Sep 2017 22:25) (15 - 20)  SpO2: 98% (18 Sep 2017 22:25) (95% - 100%)    Gen: NAD, A& O x3  Abd: soft, tender, non-distended  LUE: tender, no erythema, hematoma, or active bleeding, dressing c/d/i soft, audible bruit, palp pulses  drain: serosanguinous    I&O's Detail    17 Sep 2017 07:01  -  18 Sep 2017 07:00  --------------------------------------------------------  IN:    Oral Fluid: 1250 mL    sodium chloride 0.9%: 500 mL  Total IN: 1750 mL    OUT:  Total OUT: 0 mL    Total NET: 1750 mL      18 Sep 2017 07:01  -  18 Sep 2017 23:45  --------------------------------------------------------  IN:  Total IN: 0 mL    OUT:    Indwelling Catheter - Urethral: 500 mL    Voided: 875 mL  Total OUT: 1375 mL    Total NET: -1375 mL      A/P: 57F POD 0 s/p L AVF  - pain control  - primary team notified of abd pain  - monitor drain output  - care as per primary team

## 2017-09-19 LAB
ALBUMIN SERPL ELPH-MCNC: 3.2 G/DL — LOW (ref 3.3–5)
ALP SERPL-CCNC: 191 U/L — HIGH (ref 40–120)
ALT FLD-CCNC: 7 U/L — LOW (ref 10–45)
ANION GAP SERPL CALC-SCNC: 19 MMOL/L — HIGH (ref 5–17)
ANION GAP SERPL CALC-SCNC: 21 MMOL/L — HIGH (ref 5–17)
ANION GAP SERPL CALC-SCNC: 22 MMOL/L — HIGH (ref 5–17)
AST SERPL-CCNC: 25 U/L — SIGNIFICANT CHANGE UP (ref 10–40)
BILIRUB SERPL-MCNC: 0.6 MG/DL — SIGNIFICANT CHANGE UP (ref 0.2–1.2)
BUN SERPL-MCNC: 51 MG/DL — HIGH (ref 7–23)
BUN SERPL-MCNC: 51 MG/DL — HIGH (ref 7–23)
BUN SERPL-MCNC: 53 MG/DL — HIGH (ref 7–23)
CALCIUM SERPL-MCNC: 7.9 MG/DL — LOW (ref 8.4–10.5)
CALCIUM SERPL-MCNC: 8.1 MG/DL — LOW (ref 8.4–10.5)
CALCIUM SERPL-MCNC: 8.2 MG/DL — LOW (ref 8.4–10.5)
CHLORIDE SERPL-SCNC: 100 MMOL/L — SIGNIFICANT CHANGE UP (ref 96–108)
CHLORIDE SERPL-SCNC: 101 MMOL/L — SIGNIFICANT CHANGE UP (ref 96–108)
CHLORIDE SERPL-SCNC: 99 MMOL/L — SIGNIFICANT CHANGE UP (ref 96–108)
CO2 SERPL-SCNC: 12 MMOL/L — LOW (ref 22–31)
CO2 SERPL-SCNC: 19 MMOL/L — LOW (ref 22–31)
CO2 SERPL-SCNC: 21 MMOL/L — LOW (ref 22–31)
CREAT SERPL-MCNC: 5.06 MG/DL — HIGH (ref 0.5–1.3)
CREAT SERPL-MCNC: 5.63 MG/DL — HIGH (ref 0.5–1.3)
CREAT SERPL-MCNC: 5.72 MG/DL — HIGH (ref 0.5–1.3)
GLUCOSE SERPL-MCNC: 121 MG/DL — HIGH (ref 70–99)
GLUCOSE SERPL-MCNC: 134 MG/DL — HIGH (ref 70–99)
GLUCOSE SERPL-MCNC: 44 MG/DL — CRITICAL LOW (ref 70–99)
HCT VFR BLD CALC: 31.4 % — LOW (ref 34.5–45)
HGB BLD-MCNC: 9.3 G/DL — LOW (ref 11.5–15.5)
MAGNESIUM SERPL-MCNC: 2.3 MG/DL — SIGNIFICANT CHANGE UP (ref 1.6–2.6)
MCHC RBC-ENTMCNC: 23.4 PG — LOW (ref 27–34)
MCHC RBC-ENTMCNC: 29.6 GM/DL — LOW (ref 32–36)
MCV RBC AUTO: 79.1 FL — LOW (ref 80–100)
PHOSPHATE SERPL-MCNC: 7.2 MG/DL — HIGH (ref 2.5–4.5)
PLATELET # BLD AUTO: 422 K/UL — HIGH (ref 150–400)
POTASSIUM SERPL-MCNC: 4.4 MMOL/L — SIGNIFICANT CHANGE UP (ref 3.5–5.3)
POTASSIUM SERPL-MCNC: 4.8 MMOL/L — SIGNIFICANT CHANGE UP (ref 3.5–5.3)
POTASSIUM SERPL-MCNC: 7.3 MMOL/L — CRITICAL HIGH (ref 3.5–5.3)
POTASSIUM SERPL-SCNC: 4.4 MMOL/L — SIGNIFICANT CHANGE UP (ref 3.5–5.3)
POTASSIUM SERPL-SCNC: 4.8 MMOL/L — SIGNIFICANT CHANGE UP (ref 3.5–5.3)
POTASSIUM SERPL-SCNC: 7.3 MMOL/L — CRITICAL HIGH (ref 3.5–5.3)
PROT SERPL-MCNC: 7.7 G/DL — SIGNIFICANT CHANGE UP (ref 6–8.3)
RBC # BLD: 3.97 M/UL — SIGNIFICANT CHANGE UP (ref 3.8–5.2)
RBC # FLD: 21.5 % — HIGH (ref 10.3–14.5)
SODIUM SERPL-SCNC: 135 MMOL/L — SIGNIFICANT CHANGE UP (ref 135–145)
SODIUM SERPL-SCNC: 138 MMOL/L — SIGNIFICANT CHANGE UP (ref 135–145)
SODIUM SERPL-SCNC: 141 MMOL/L — SIGNIFICANT CHANGE UP (ref 135–145)
WBC # BLD: 18.02 K/UL — HIGH (ref 3.8–10.5)
WBC # FLD AUTO: 18.02 K/UL — HIGH (ref 3.8–10.5)

## 2017-09-19 PROCEDURE — 99233 SBSQ HOSP IP/OBS HIGH 50: CPT

## 2017-09-19 PROCEDURE — 93010 ELECTROCARDIOGRAM REPORT: CPT

## 2017-09-19 PROCEDURE — 99233 SBSQ HOSP IP/OBS HIGH 50: CPT | Mod: GC

## 2017-09-19 RX ORDER — SODIUM POLYSTYRENE SULFONATE 4.1 MEQ/G
15 POWDER, FOR SUSPENSION ORAL
Qty: 0 | Refills: 0 | Status: DISCONTINUED | OUTPATIENT
Start: 2017-09-19 | End: 2017-09-21

## 2017-09-19 RX ORDER — HYDROMORPHONE HYDROCHLORIDE 2 MG/ML
0.5 INJECTION INTRAMUSCULAR; INTRAVENOUS; SUBCUTANEOUS EVERY 6 HOURS
Qty: 0 | Refills: 0 | Status: DISCONTINUED | OUTPATIENT
Start: 2017-09-19 | End: 2017-09-19

## 2017-09-19 RX ORDER — SODIUM POLYSTYRENE SULFONATE 4.1 MEQ/G
30 POWDER, FOR SUSPENSION ORAL ONCE
Qty: 0 | Refills: 0 | Status: COMPLETED | OUTPATIENT
Start: 2017-09-19 | End: 2017-09-19

## 2017-09-19 RX ORDER — FUROSEMIDE 40 MG
60 TABLET ORAL DAILY
Qty: 0 | Refills: 0 | Status: DISCONTINUED | OUTPATIENT
Start: 2017-09-19 | End: 2017-09-21

## 2017-09-19 RX ORDER — ALBUTEROL 90 UG/1
2.5 AEROSOL, METERED ORAL ONCE
Qty: 0 | Refills: 0 | Status: COMPLETED | OUTPATIENT
Start: 2017-09-19 | End: 2017-09-19

## 2017-09-19 RX ORDER — SODIUM BICARBONATE 1 MEQ/ML
1300 SYRINGE (ML) INTRAVENOUS
Qty: 0 | Refills: 0 | Status: DISCONTINUED | OUTPATIENT
Start: 2017-09-19 | End: 2017-09-21

## 2017-09-19 RX ORDER — HYDROMORPHONE HYDROCHLORIDE 2 MG/ML
0.5 INJECTION INTRAMUSCULAR; INTRAVENOUS; SUBCUTANEOUS EVERY 4 HOURS
Qty: 0 | Refills: 0 | Status: DISCONTINUED | OUTPATIENT
Start: 2017-09-19 | End: 2017-09-21

## 2017-09-19 RX ADMIN — HYDROMORPHONE HYDROCHLORIDE 0.5 MILLIGRAM(S): 2 INJECTION INTRAMUSCULAR; INTRAVENOUS; SUBCUTANEOUS at 13:05

## 2017-09-19 RX ADMIN — Medication 667 MILLIGRAM(S): at 12:16

## 2017-09-19 RX ADMIN — Medication 650 MILLIGRAM(S): at 05:22

## 2017-09-19 RX ADMIN — HYDROMORPHONE HYDROCHLORIDE 0.5 MILLIGRAM(S): 2 INJECTION INTRAMUSCULAR; INTRAVENOUS; SUBCUTANEOUS at 18:06

## 2017-09-19 RX ADMIN — Medication 50 MILLILITER(S): at 00:15

## 2017-09-19 RX ADMIN — HYDROMORPHONE HYDROCHLORIDE 0.5 MILLIGRAM(S): 2 INJECTION INTRAMUSCULAR; INTRAVENOUS; SUBCUTANEOUS at 18:21

## 2017-09-19 RX ADMIN — Medication 1300 MILLIGRAM(S): at 12:15

## 2017-09-19 RX ADMIN — Medication 60 MILLIGRAM(S): at 12:15

## 2017-09-19 RX ADMIN — HYDROMORPHONE HYDROCHLORIDE 0.5 MILLIGRAM(S): 2 INJECTION INTRAMUSCULAR; INTRAVENOUS; SUBCUTANEOUS at 09:41

## 2017-09-19 RX ADMIN — HYDROMORPHONE HYDROCHLORIDE 0.5 MILLIGRAM(S): 2 INJECTION INTRAMUSCULAR; INTRAVENOUS; SUBCUTANEOUS at 09:26

## 2017-09-19 RX ADMIN — Medication 60 MILLIGRAM(S): at 00:16

## 2017-09-19 RX ADMIN — HYDROMORPHONE HYDROCHLORIDE 0.5 MILLIGRAM(S): 2 INJECTION INTRAMUSCULAR; INTRAVENOUS; SUBCUTANEOUS at 00:00

## 2017-09-19 RX ADMIN — HYDROMORPHONE HYDROCHLORIDE 0.5 MILLIGRAM(S): 2 INJECTION INTRAMUSCULAR; INTRAVENOUS; SUBCUTANEOUS at 23:21

## 2017-09-19 RX ADMIN — HYDROMORPHONE HYDROCHLORIDE 0.5 MILLIGRAM(S): 2 INJECTION INTRAMUSCULAR; INTRAVENOUS; SUBCUTANEOUS at 05:53

## 2017-09-19 RX ADMIN — HYDROMORPHONE HYDROCHLORIDE 0.5 MILLIGRAM(S): 2 INJECTION INTRAMUSCULAR; INTRAVENOUS; SUBCUTANEOUS at 06:08

## 2017-09-19 RX ADMIN — ALBUTEROL 2.5 MILLIGRAM(S): 90 AEROSOL, METERED ORAL at 00:41

## 2017-09-19 RX ADMIN — HYDROMORPHONE HYDROCHLORIDE 0.5 MILLIGRAM(S): 2 INJECTION INTRAMUSCULAR; INTRAVENOUS; SUBCUTANEOUS at 12:50

## 2017-09-19 RX ADMIN — INSULIN HUMAN 5 UNIT(S): 100 INJECTION, SOLUTION SUBCUTANEOUS at 00:16

## 2017-09-19 RX ADMIN — SODIUM POLYSTYRENE SULFONATE 30 GRAM(S): 4.1 POWDER, FOR SUSPENSION ORAL at 12:15

## 2017-09-19 RX ADMIN — HYDROMORPHONE HYDROCHLORIDE 0.5 MILLIGRAM(S): 2 INJECTION INTRAMUSCULAR; INTRAVENOUS; SUBCUTANEOUS at 23:35

## 2017-09-19 RX ADMIN — Medication 1300 MILLIGRAM(S): at 18:11

## 2017-09-19 NOTE — PROGRESS NOTE ADULT - PROBLEM SELECTOR PLAN 3
No further PD due to predisposition to hemoperitoneum.  Multiple episodes of hyperkalemia this admission managed medically thus far with albuterol/insulin/D50/lasix.   - POD#1 LUE AVF placement  - pending permacath placement by IR due to refractory hyperkalemia  - f/u Renal Recs  - c/w Bicarb  - c/w phoslo for rising phos Possibly 2/2 traction of PD catheter with underlying varices vs. rupture smaller collateral vessel during PD due to extensive collateralization of venous mesenteric system seen on CT A/P. Hgb stable.  - POD#1 from PD cath removal  - c/w daily CBC

## 2017-09-19 NOTE — PROGRESS NOTE ADULT - SUBJECTIVE AND OBJECTIVE BOX
Trav Domingo, MS 4    Sraa Zurita MD  PGY-3, Medicine Team 3  pager: [phone]    Patient is a 57y old  Female who presents with a chief complaint of Peritonitis, Hemoperitoneum (03 Sep 2017 20:09)      SUBJECTIVE / OVERNIGHT EVENTS:   No acute events overnight. Pt reports soreness LUE at surgical site. Otherwise denies fevers, chills, chest pain, SOB, abd pain, n/v/d, swelling or skin changes. She has not had a bowel movement yet since the surgery.      Vital Signs Last 24 Hrs  T(C): 36.7 (19 Sep 2017 05:43), Max: 36.9 (18 Sep 2017 22:25)  T(F): 98 (19 Sep 2017 05:43), Max: 98.4 (18 Sep 2017 22:25)  HR: 84 (19 Sep 2017 05:43) (70 - 88)  BP: 116/73 (19 Sep 2017 05:43) (101/54 - 139/73)  BP(mean): 97 (18 Sep 2017 21:00) (86 - 98)  RR: 18 (19 Sep 2017 05:43) (8 - 20)  SpO2: 96% (19 Sep 2017 05:43) (96% - 100%)    PHYSICAL EXAM:  GENERAL: NAD, well-developed  HEAD:  Atraumatic, Normocephalic  EYES: EOMI, PERRLA, conjunctiva and sclera clear  NECK: Supple, No JVD  CHEST/LUNG: Clear to auscultation bilaterally; No wheeze  HEART: Regular rate and rhythm; No murmurs, rubs, or gallops  ABDOMEN: Soft, slight tenderness around incision site but C/D/I no signs of infection, Nondistended; Bowel sounds present. PD catheter noted  EXTREMITIES: LUE tenderness around incision site, no erythema, no hematoma, no active bleeding, surgical site C/D/I, audible bruit and palpable thrill, small amount of serosanguinous fluid in NATALIIA drain, good radial pulses bilaterally, sensation intact to light touch distally  PSYCH: AAOx3  NEUROLOGY: non-focal  SKIN: No rashes or lesion      LABS:                        8.4    12.32 )-----------( 479      ( 18 Sep 2017 09:36 )             28.8     09-18    138  |  103  |  48<H>  ----------------------------<  160<H>  6.1<H>   |  15<L>  |  5.05<H>    Ca    7.8<L>      18 Sep 2017 20:00  Phos  5.8     09-18  Mg     2.2     09-18      PT/INR - ( 18 Sep 2017 09:36 )   PT: 13.6 sec;   INR: 1.20 ratio         PTT - ( 18 Sep 2017 09:36 )  PTT:36.0 sec      I&O's Summary    18 Sep 2017 07:01  -  19 Sep 2017 07:00  --------------------------------------------------------  IN: 220 mL / OUT: 1485 mL / NET: -1265 mL          MEDICATIONS  (STANDING):  calcium acetate 667 milliGRAM(s) Oral three times a day with meals  docusate sodium 100 milliGRAM(s) Oral two times a day  lactulose Syrup 5 Gram(s) Oral daily  polyethylene glycol 3350 17 Gram(s) Oral daily  simethicone 80 milliGRAM(s) Chew two times a day  sodium bicarbonate 650 milliGRAM(s) Oral four times a day    MEDICATIONS  (PRN):  HYDROmorphone  Injectable 0.5 milliGRAM(s) IV Push every 4 hours PRN Severe Pain (7 - 10) Trav Domingo, MS 4    Sara Zurita MD  PGY-3, Medicine Team 3  pager: [phone]    Patient is a 57y old  Female who presents with a chief complaint of Peritonitis, Hemoperitoneum (03 Sep 2017 20:09)      SUBJECTIVE / OVERNIGHT EVENTS:   No acute events overnight. Pt reports soreness LUE at surgical site. Otherwise denies fevers, chills, chest pain, SOB, abd pain, n/v/d, swelling or skin changes. She has not had a bowel movement yet since the surgery.      Vital Signs Last 24 Hrs  T(C): 37.1 (19 Sep 2017 14:31), Max: 37.1 (19 Sep 2017 14:31)  T(F): 98.8 (19 Sep 2017 14:31), Max: 98.8 (19 Sep 2017 14:31)  HR: 94 (19 Sep 2017 14:31) (70 - 94)  BP: 118/72 (19 Sep 2017 14:31) (101/54 - 139/73)  BP(mean): 97 (18 Sep 2017 21:00) (86 - 98)  RR: 18 (19 Sep 2017 14:31) (8 - 18)  SpO2: 95% (19 Sep 2017 14:31) (95% - 100%)    PHYSICAL EXAM:  GENERAL: NAD, well-developed  HEAD:  Atraumatic, Normocephalic  EYES: EOMI, PERRLA, conjunctiva and sclera clear  NECK: Supple, No JVD  CHEST/LUNG: Clear to auscultation bilaterally; No wheeze  HEART: Regular rate and rhythm; No murmurs, rubs, or gallops  ABDOMEN: Soft, slight tenderness around incision site but C/D/I no signs of infection, Nondistended; Bowel sounds present.   EXTREMITIES: LUE tenderness around incision site, no erythema, no hematoma, no active bleeding, surgical site C/D/I, audible bruit and palpable thrill, small amount of serosanguinous fluid in NATALIIA drain, good radial pulses bilaterally, sensation intact to light touch distally  Stoddard present w/ clear yellow urine  PSYCH: AAOx3  NEUROLOGY: non-focal  SKIN: No rashes or lesion      LABS:                         9.3    18.02 )-----------( 422      ( 19 Sep 2017 07:37 )             31.4     09-19    138  |  100  |  53<H>  ----------------------------<  121<H>  4.8   |  19<L>  |  5.72<H>    Ca    8.2<L>      19 Sep 2017 12:51  Phos  7.2     09-19  Mg     2.3     09-19    TPro  7.7  /  Alb  3.2<L>  /  TBili  0.6  /  DBili  x   /  AST  25  /  ALT  7<L>  /  AlkPhos  191<H>  09-19    PT/INR - ( 18 Sep 2017 09:36 )   PT: 13.6 sec;   INR: 1.20 ratio         PTT - ( 18 Sep 2017 09:36 )  PTT:36.0 sec      Labs reviewed remarkable for INCR WBC likely reactive, hyperK improved s/p med management      I&O's Summary    18 Sep 2017 07:01  -  19 Sep 2017 07:00  --------------------------------------------------------  IN: 220 mL / OUT: 1485 mL / NET: -1265 mL      MEDICATIONS  (STANDING):  calcium acetate 667 milliGRAM(s) Oral three times a day with meals  docusate sodium 100 milliGRAM(s) Oral two times a day  lactulose Syrup 5 Gram(s) Oral daily  simethicone 80 milliGRAM(s) Chew two times a day  sodium bicarbonate 1300 milliGRAM(s) Oral three times a day before meals  furosemide    Tablet 60 milliGRAM(s) Oral daily  sodium polystyrene sulfonate Suspension 15 Gram(s) Oral <User Schedule>    MEDICATIONS  (PRN):  HYDROmorphone  Injectable 0.5 milliGRAM(s) IV Push every 4 hours PRN Severe Pain (7 - 10) Trav Domingo, MS 4    Sara Zurita MD  PGY-3, Medicine Team 3  pager: [phone]    Patient is a 57y old  Female who presents with a chief complaint of Peritonitis, Hemoperitoneum (03 Sep 2017 20:09)      SUBJECTIVE / OVERNIGHT EVENTS:   No acute events overnight. Pt reports soreness LUE at surgical site. Otherwise denies fevers, chills, chest pain, SOB, abd pain, n/v/d, swelling or skin changes. She has not had a bowel movement yet since the surgery.      Vital Signs Last 24 Hrs  T(C): 37.1 (19 Sep 2017 14:31), Max: 37.1 (19 Sep 2017 14:31)  T(F): 98.8 (19 Sep 2017 14:31), Max: 98.8 (19 Sep 2017 14:31)  HR: 94 (19 Sep 2017 14:31) (70 - 94)  BP: 118/72 (19 Sep 2017 14:31) (101/54 - 139/73)  BP(mean): 97 (18 Sep 2017 21:00) (86 - 98)  RR: 18 (19 Sep 2017 14:31) (8 - 18)  SpO2: 95% (19 Sep 2017 14:31) (95% - 100%)    PHYSICAL EXAM:  GENERAL: NAD, well-developed  HEAD:  Atraumatic, Normocephalic  EYES: EOMI, PERRLA, conjunctiva and sclera clear  NECK: Supple, No JVD  CHEST/LUNG: Clear to auscultation bilaterally; No wheeze  HEART: Regular rate and rhythm; No murmurs, rubs, or gallops  ABDOMEN: Soft, slight tenderness around incision site but C/D/I no signs of infection, Nondistended; Bowel sounds present.   EXTREMITIES: LUE tenderness around incision site, no erythema, no hematoma, no active bleeding, surgical site C/D/I, audible bruit and palpable thrill, small amount of serosanguinous fluid in NATALIIA drain, good radial pulses bilaterally, sensation intact to light touch distally  Stoddard present w/ clear yellow urine  PSYCH: AAOx3  NEUROLOGY: non-focal  SKIN: No rashes or lesion      LABS:                         9.3    18.02 )-----------( 422      ( 19 Sep 2017 07:37 )             31.4     09-19    138  |  100  |  53<H>  ----------------------------<  121<H>  4.8   |  19<L>  |  5.72<H>    Ca    8.2<L>      19 Sep 2017 12:51  Phos  7.2     09-19  Mg     2.3     09-19    TPro  7.7  /  Alb  3.2<L>  /  TBili  0.6  /  DBili  x   /  AST  25  /  ALT  7<L>  /  AlkPhos  191<H>  09-19    PT/INR - ( 18 Sep 2017 09:36 )   PT: 13.6 sec;   INR: 1.20 ratio         PTT - ( 18 Sep 2017 09:36 )  PTT:36.0 sec      Labs reviewed remarkable for INCR WBC likely reactive, hyperK improved s/p med management  EKG no significant peaked T waves    I&O's Summary    18 Sep 2017 07:01  -  19 Sep 2017 07:00  --------------------------------------------------------  IN: 220 mL / OUT: 1485 mL / NET: -1265 mL      MEDICATIONS  (STANDING):  calcium acetate 667 milliGRAM(s) Oral three times a day with meals  docusate sodium 100 milliGRAM(s) Oral two times a day  lactulose Syrup 5 Gram(s) Oral daily  simethicone 80 milliGRAM(s) Chew two times a day  sodium bicarbonate 1300 milliGRAM(s) Oral three times a day before meals  furosemide    Tablet 60 milliGRAM(s) Oral daily  sodium polystyrene sulfonate Suspension 15 Gram(s) Oral <User Schedule>    MEDICATIONS  (PRN):  HYDROmorphone  Injectable 0.5 milliGRAM(s) IV Push every 4 hours PRN Severe Pain (7 - 10)

## 2017-09-19 NOTE — PROGRESS NOTE ADULT - ASSESSMENT
57F S/p L AVF with basilic vein transposition, removal of PD catheter  - pain control with IV medications  - Diet, Renal restrictions  - C/w topete  - monitor drain output  - care as per primary team.

## 2017-09-19 NOTE — PROGRESS NOTE ADULT - SUBJECTIVE AND OBJECTIVE BOX
[facility] DIVISION OF KIDNEY DISEASES AND HYPERTENSION -- FOLLOW UP NOTE  --------------------------------------------------------------------------------  Chief Complaint:/subjective: no complaints    24 hour events: potassium elevated and was repeated this morning        PAST HISTORY  --------------------------------------------------------------------------------  No significant changes to PMH, PSH, FHx, SHx, unless otherwise noted    ALLERGIES & MEDICATIONS  --------------------------------------------------------------------------------  Allergies    No Known Allergies    Intolerances      Standing Inpatient Medications  calcium acetate 667 milliGRAM(s) Oral three times a day with meals  docusate sodium 100 milliGRAM(s) Oral two times a day  lactulose Syrup 5 Gram(s) Oral daily  simethicone 80 milliGRAM(s) Chew two times a day  sodium bicarbonate 1300 milliGRAM(s) Oral three times a day before meals  furosemide    Tablet 60 milliGRAM(s) Oral daily  sodium polystyrene sulfonate Suspension 15 Gram(s) Oral <User Schedule>  sodium polystyrene sulfonate Suspension 30 Gram(s) Oral once    PRN Inpatient Medications  HYDROmorphone  Injectable 0.5 milliGRAM(s) IV Push every 4 hours PRN      REVIEW OF SYSTEMS  --------------------------------------------------------------------------------  Gen: No weight changes, fatigue, fevers/chills, weakness  Skin: No rashes  Head/Eyes/Ears/Mouth: No headache;   Respiratory: No dyspnea, cough  CV: No chest pain, PND, orthopnea  GI: No abdominal pain, diarrhea, constipation, nausea, vomiting  : No increased frequency, dysuria, hematuria, nocturia  MSK: No joint pain/swelling; no back pain; no edema  Neuro: No dizziness/lightheadedness, weakness  Heme: No easy bruising or bleeding  Psych: No significant nervousness, anxiety, stress, depression    All other systems were reviewed and are negative, except as noted.    VITALS/PHYSICAL EXAM  --------------------------------------------------------------------------------  T(C): 36.7 (09-19-17 @ 05:43), Max: 36.9 (09-18-17 @ 22:25)  HR: 84 (09-19-17 @ 05:43) (70 - 88)  BP: 116/73 (09-19-17 @ 05:43) (101/54 - 139/73)  RR: 18 (09-19-17 @ 05:43) (8 - 20)  SpO2: 96% (09-19-17 @ 05:43) (96% - 100%)  Wt(kg): --  Adult Advanced Hemodynamics Last 24 Hrs  ABP: --  ABP(mean): --  Wt(kg): --  CVP(mm Hg): --  CO: --  CI: --  PA: --  PA(mean): --  PCWP: --  SVR: --  SVRI: --  Height (cm): 157 (09-18-17 @ 14:52)  Weight (kg): 54.5 (09-18-17 @ 14:52)  BMI (kg/m2): 22.1 (09-18-17 @ 14:52)  BSA (m2): 1.54 (09-18-17 @ 14:52)      09-18-17 @ 07:01  -  09-19-17 @ 07:00  --------------------------------------------------------  IN: 220 mL / OUT: 1485 mL / NET: -1265 mL      Physical Exam:  	Gen: NAD, well-appearing  	HEENT: PERRL, supple neck, no jvp  	Pulm: CTA B/L  	CV: RRR, S1S2; no rub  	Back: No spinal or CVA tenderness; no sacral edema  	Abd: +BS, soft, nontender/nondistended  	: No suprapubic tenderness  	Ext: no edema; +left avf +drain  	Neuro: No focal deficits, intact gait  	Psych: Normal affect and mood  	Skin: Warm, without rashes  	Vascular access: as above; pd cath removed    LABS/STUDIES  --------------------------------------------------------------------------------              9.3    18.02 >-----------<  422      [09-19-17 @ 07:37]              31.4     Hemoglobin: 9.3 g/dL (09-19-17 @ 07:37)  Hemoglobin: 8.4 g/dL (09-18-17 @ 09:36)    Platelet Count - Automated: 422 K/uL (09-19-17 @ 07:37)  Platelet Count - Automated: 479 K/uL (09-18-17 @ 09:36)    135  |  101  |  51  ----------------------------<  44      [09-19-17 @ 07:44]  7.3   |  12  |  5.06        Ca     8.1     [09-19-17 @ 07:44]      Mg     2.3     [09-19-17 @ 07:44]      Phos  7.2     [09-19-17 @ 07:44]    TPro  7.7  /  Alb  3.2  /  TBili  0.6  /  DBili  x   /  AST  25  /  ALT  7   /  AlkPhos  191  [09-19-17 @ 07:44]    PT/INR: PT 13.6 , INR 1.20       [09-18-17 @ 09:36]  PTT: 36.0       [09-18-17 @ 09:36]      Creatinine Trend:  SCr 5.06 [09-19 @ 07:44]  SCr 5.05 [09-18 @ 20:00]  SCr 4.91 [09-18 @ 15:10]  SCr 4.86 [09-18 @ 09:20]  SCr 4.91 [09-17 @ 08:28]    Urinalysis - [09-03-17 @ 22:23]      Color  / Appearance Clear / SG 1.010 / pH 6.5      Gluc Trace / Ketone Negative  / Bili Negative / Urobili Negative       Blood Negative / Protein 100 / Leuk Est Negative / Nitrite Negative      RBC 0-2 / WBC 2-5 / Hyaline  / Gran  / Sq Epi  / Non Sq Epi  / Bacteria Few      HbA1c 5.7      [01-18-17 @ 07:32]  TSH 1.15      [01-18-17 @ 07:32]  Lipid: chol 95, , HDL 31, LDL 42      [10-04-16 @ 07:23]

## 2017-09-19 NOTE — PROGRESS NOTE ADULT - PROBLEM SELECTOR PLAN 1
Resolving, likely 2/2 peritonitis due to rupture of small peripancreatic varices/collateral venous mesenteric vessels/hematomas seen on CT.  Repeat CT with mildly enlarged hematoma, but overall improvement of hemoperitoneum.  - POD#1 from PD cath removal  - c/w daily CBC  - Monitor off abx as per ID   - c/w bowel regimen. Avoid enemas given history of ESRD (concern for phosphorous absorption) No further PD due to predisposition to hemoperitoneum.  Multiple episodes of hyperkalemia this admission managed medically thus far with albuterol/insulin/D50/lasix.   - POD#1 LUE AVF placement  - pending permacath placement by IR due to refractory hyperkalemia  - f/u Renal Recs  - c/w Bicarb  - c/w phoslo for rising phos No further PD due to predisposition to hemoperitoneum.  Multiple episodes of hyperkalemia this admission managed medically thus far with albuterol/insulin/D50/lasix.   - POD#1 LUE AVF placement  - Per renal, will start lasix 60 PO daily, kayexelate 3x/wk and increase bicarb dose to medically manage her hyperkalemia given that patient should not require urgent hemodialysis in the setting of euvolemia and good urine output  - c/w phoslo for rising phos No further PD due to predisposition to hemoperitoneum.  Multiple episodes of hyperkalemia this admission managed medically thus far with albuterol/insulin/D50/lasix.   - POD#1 LUE AVF placement  - Per renal, will start lasix 60 PO daily, kayexelate 3x/wk and increase bicarb dose to medically manage her hyperkalemia given that patient should not require urgent hemodialysis in the setting of euvolemia and good urine output  - c/w phoslo for rising phos  - d/c topete by tomorrow

## 2017-09-19 NOTE — PROGRESS NOTE ADULT - SUBJECTIVE AND OBJECTIVE BOX
[facility] POST-OP    SUBJECTIVE: Pt seen at bedside. Comfortable, NAD. Pain controlled. No N/V/D. Not passing gas, not passing stool. Patient can move arm and has good sensation in left fingertips.      Vital Signs Last 24 Hrs  T(C): 36.7 (19 Sep 2017 05:43), Max: 36.9 (18 Sep 2017 22:25)  T(F): 98 (19 Sep 2017 05:43), Max: 98.4 (18 Sep 2017 22:25)  HR: 84 (19 Sep 2017 05:43) (70 - 88)  BP: 116/73 (19 Sep 2017 05:43) (101/54 - 139/73)  BP(mean): 97 (18 Sep 2017 21:00) (86 - 98)  RR: 18 (19 Sep 2017 05:43) (8 - 20)  SpO2: 96% (19 Sep 2017 05:43) (96% - 100%)    Physical Exam  General: awake, alert,    Pulm: respirations unlabored, no increased WOB  Abdomen: soft, mildly distended, NT, incision c/d/i  Extremities: Grossly symmetric. Left extremity has good capillary refill and palpable radial pule.. NATALIIA serosanguinous.    I&O's Summary    18 Sep 2017 07:01  -  19 Sep 2017 07:00  --------------------------------------------------------  IN: 220 mL / OUT: 1485 mL / NET: -1265 mL      I&O's Detail    18 Sep 2017 07:01  -  19 Sep 2017 07:00  --------------------------------------------------------  IN:    Oral Fluid: 220 mL  Total IN: 220 mL    OUT:    Bulb: 10 mL    Indwelling Catheter - Urethral: 600 mL    Voided: 875 mL  Total OUT: 1485 mL    Total NET: -1265 mL          MEDICATIONS  (STANDING):  calcium acetate 667 milliGRAM(s) Oral three times a day with meals  docusate sodium 100 milliGRAM(s) Oral two times a day  lactulose Syrup 5 Gram(s) Oral daily  polyethylene glycol 3350 17 Gram(s) Oral daily  simethicone 80 milliGRAM(s) Chew two times a day  sodium bicarbonate 650 milliGRAM(s) Oral four times a day    MEDICATIONS  (PRN):  HYDROmorphone  Injectable 0.5 milliGRAM(s) IV Push every 4 hours PRN Severe Pain (7 - 10)      LABS:                        8.4    12.32 )-----------( 479      ( 18 Sep 2017 09:36 )             28.8     09-18    138  |  103  |  48<H>  ----------------------------<  160<H>  6.1<H>   |  15<L>  |  5.05<H>    Ca    7.8<L>      18 Sep 2017 20:00  Phos  5.8     09-18  Mg     2.2     09-18    TPro  6.7  /  Alb  2.9<L>  /  TBili  1.0  /  DBili  x   /  AST  18  /  ALT  6<L>  /  AlkPhos  150<H>  09-17    PT/INR - ( 18 Sep 2017 09:36 )   PT: 13.6 sec;   INR: 1.20 ratio         PTT - ( 18 Sep 2017 09:36 )  PTT:36.0 sec      RADIOLOGY & ADDITIONAL STUDIES:

## 2017-09-19 NOTE — PROGRESS NOTE ADULT - ASSESSMENT
Patient is a 58 y/o F w/ ESRD on HD who presented to OSH with abdominal pain, found with significant hemoperitoneum likely from rupture of collateral mesenteric vessel, deemed that PD is contraindicated.

## 2017-09-19 NOTE — PROGRESS NOTE ADULT - ASSESSMENT
Assessment/Plan: 57y Female s/p removal of PD catheter and placement of LUE AVF.     - Change dressing over open abdominal wound daily.   - Keep steristrips on arm intact  - Follow up with Dr. Dempsey and Dr. Anaya after discharge 2 weeks postop    Pager [phone]

## 2017-09-19 NOTE — PROGRESS NOTE ADULT - SUBJECTIVE AND OBJECTIVE BOX
VASCULAR SURGERY  Pager: [phone]    STATUS POST:  removal of PD catheter, LUE AVF    POST OPERATIVE DAY #1      INTERVAL EVENTS/SUBJECTIVE:   Reports some arm soreness but otherwise well.  ______________________________________________  OBJECTIVE:   T(C): 36.7 (09-19-17 @ 05:43), Max: 36.9 (09-18-17 @ 22:25)  HR: 84 (09-19-17 @ 05:43) (70 - 88)  BP: 116/73 (09-19-17 @ 05:43) (101/54 - 139/73)  RR: 18 (09-19-17 @ 05:43) (8 - 20)  SpO2: 96% (09-19-17 @ 05:43) (96% - 100%)  Wt(kg): --  CAPILLARY BLOOD GLUCOSE    I&O's Detail    18 Sep 2017 07:01  -  19 Sep 2017 07:00  --------------------------------------------------------  IN:    Oral Fluid: 220 mL  Total IN: 220 mL    OUT:    Bulb: 10 mL    Indwelling Catheter - Urethral: 600 mL    Voided: 875 mL  Total OUT: 1485 mL    Total NET: -1265 mL      Physical exam:  Gen: NAD  LUE AVF with palpable thrill. Steristrips intact. Drain output scant - old blood. Palpable radial. Hand warm with sensation and motor function intact.  Abdominal incision with staples intact. Small open wound with sanguinous drainage.  ______________________________________________  LABS:  CBC Full  -  ( 18 Sep 2017 09:36 )  WBC Count : 12.32 K/uL  Hemoglobin : 8.4 g/dL  Hematocrit : 28.8 %  Platelet Count - Automated : 479 K/uL  Mean Cell Volume : 75.6 fl  Mean Cell Hemoglobin : 22.0 pg  Mean Cell Hemoglobin Concentration : 29.2 gm/dL  Auto Neutrophil # : 11.26 K/uL  Auto Lymphocyte # : 0.64 K/uL  Auto Monocyte # : 0.21 K/uL  Auto Eosinophil # : 0.00 K/uL  Auto Basophil # : 0.21 K/uL  Auto Neutrophil % : 91.4 %  Auto Lymphocyte % : 5.2 %  Auto Monocyte % : 1.7 %  Auto Eosinophil % : 0.0 %  Auto Basophil % : 1.7 %    09-18    138  |  103  |  48<H>  ----------------------------<  160<H>  6.1<H>   |  15<L>  |  5.05<H>    Ca    7.8<L>      18 Sep 2017 20:00  Phos  5.8     09-18  Mg     2.2     09-18        PT/INR - ( 18 Sep 2017 09:36 )   PT: 13.6 sec;   INR: 1.20 ratio         PTT - ( 18 Sep 2017 09:36 )  PTT:36.0 sec  ______________________________________________

## 2017-09-19 NOTE — PROGRESS NOTE ADULT - PROBLEM SELECTOR PLAN 2
PD discontinued due to hemoperitoneum.  Managing electrolytes and volume medically with ESRD level renal function.  Maintain strict renal restrictions.  PD catheter removal and AVF placed yesterday; medically manage for now;

## 2017-09-19 NOTE — PROGRESS NOTE ADULT - PROBLEM SELECTOR PLAN 6
DVT ppx  - SCDs and encourage ambulation due to recent bleeding  - Renal diet    f/u SW - emergency medicaid. Goal to facilitate medical workup inpt given insurance issues    Trav Domingo, MS 4    Sara Zurita MD  PGY-3, Medicine Team 3  pager: [phone]

## 2017-09-19 NOTE — PROGRESS NOTE ADULT - PROBLEM SELECTOR PLAN 2
Possibly 2/2 traction of PD catheter with underlying varices vs. rupture smaller collateral vessel during PD due to extensive collateralization of venous mesenteric system seen on CT A/P. Hgb stable.  - POD#1 from PD cath removal  - c/w daily CBC Resolving, likely 2/2 peritonitis due to rupture of small peripancreatic varices/collateral venous mesenteric vessels/hematomas seen on CT.  Repeat CT with mildly enlarged hematoma, but overall improvement of hemoperitoneum.  - POD#1 from PD cath removal  - c/w daily CBC  - Monitor off abx as per ID   - c/w bowel regimen. Avoid enemas given history of ESRD (concern for phosphorous absorption) Resolving, likely 2/2 peritonitis due to rupture of small peripancreatic varices/collateral venous mesenteric vessels/hematomas seen on CT.  Repeat CT with mildly enlarged hematoma, but overall improvement of hemoperitoneum.  - POD#1 from PD cath removal  - c/w daily CBC  - Monitor off abx as per ID   - c/w bowel regimen.

## 2017-09-19 NOTE — PROVIDER CONTACT NOTE (CRITICAL VALUE NOTIFICATION) - TEST AND RESULT REPORTED:
Glucose 44,    K+  7.3 Note: If not drawn in a grey top tube, glucose may be due to delays in centrification

## 2017-09-19 NOTE — PROGRESS NOTE ADULT - ASSESSMENT
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 Hospital course c/b hyperkalemia refractory to medical management, now pending permacath setup by IR.

## 2017-09-20 ENCOUNTER — TRANSCRIPTION ENCOUNTER (OUTPATIENT)
Age: 57
End: 2017-09-20

## 2017-09-20 LAB
ALBUMIN SERPL ELPH-MCNC: 2.9 G/DL — LOW (ref 3.3–5)
ALP SERPL-CCNC: 159 U/L — HIGH (ref 40–120)
ALT FLD-CCNC: <4 U/L — LOW (ref 10–45)
ANION GAP SERPL CALC-SCNC: 21 MMOL/L — HIGH (ref 5–17)
APTT BLD: 35.7 SEC — SIGNIFICANT CHANGE UP (ref 27.5–37.4)
AST SERPL-CCNC: 25 U/L — SIGNIFICANT CHANGE UP (ref 10–40)
BILIRUB SERPL-MCNC: 0.8 MG/DL — SIGNIFICANT CHANGE UP (ref 0.2–1.2)
BUN SERPL-MCNC: 50 MG/DL — HIGH (ref 7–23)
CALCIUM SERPL-MCNC: 7.8 MG/DL — LOW (ref 8.4–10.5)
CHLORIDE SERPL-SCNC: 99 MMOL/L — SIGNIFICANT CHANGE UP (ref 96–108)
CO2 SERPL-SCNC: 19 MMOL/L — LOW (ref 22–31)
CREAT SERPL-MCNC: 6.21 MG/DL — HIGH (ref 0.5–1.3)
GLUCOSE SERPL-MCNC: 94 MG/DL — SIGNIFICANT CHANGE UP (ref 70–99)
HCT VFR BLD CALC: 30.4 % — LOW (ref 34.5–45)
HGB BLD-MCNC: 8.7 G/DL — LOW (ref 11.5–15.5)
INR BLD: 1.24 RATIO — HIGH (ref 0.88–1.16)
MAGNESIUM SERPL-MCNC: 2.1 MG/DL — SIGNIFICANT CHANGE UP (ref 1.6–2.6)
MCHC RBC-ENTMCNC: 21.7 PG — LOW (ref 27–34)
MCHC RBC-ENTMCNC: 28.6 GM/DL — LOW (ref 32–36)
MCV RBC AUTO: 75.8 FL — LOW (ref 80–100)
PHOSPHATE SERPL-MCNC: 6.8 MG/DL — HIGH (ref 2.5–4.5)
PLATELET # BLD AUTO: 489 K/UL — HIGH (ref 150–400)
POTASSIUM SERPL-MCNC: 4.9 MMOL/L — SIGNIFICANT CHANGE UP (ref 3.5–5.3)
POTASSIUM SERPL-SCNC: 4.9 MMOL/L — SIGNIFICANT CHANGE UP (ref 3.5–5.3)
PROT SERPL-MCNC: 7.2 G/DL — SIGNIFICANT CHANGE UP (ref 6–8.3)
PROTHROM AB SERPL-ACNC: 14.1 SEC — HIGH (ref 10–13.1)
RBC # BLD: 4.01 M/UL — SIGNIFICANT CHANGE UP (ref 3.8–5.2)
RBC # FLD: 21.3 % — HIGH (ref 10.3–14.5)
SODIUM SERPL-SCNC: 139 MMOL/L — SIGNIFICANT CHANGE UP (ref 135–145)
WBC # BLD: 15.97 K/UL — HIGH (ref 3.8–10.5)
WBC # FLD AUTO: 15.97 K/UL — HIGH (ref 3.8–10.5)

## 2017-09-20 PROCEDURE — 99233 SBSQ HOSP IP/OBS HIGH 50: CPT | Mod: GC

## 2017-09-20 RX ORDER — CALCIUM CARBONATE 500(1250)
2 TABLET ORAL THREE TIMES A DAY
Qty: 0 | Refills: 0 | Status: DISCONTINUED | OUTPATIENT
Start: 2017-09-20 | End: 2017-09-21

## 2017-09-20 RX ORDER — ONDANSETRON 8 MG/1
4 TABLET, FILM COATED ORAL ONCE
Qty: 0 | Refills: 0 | Status: COMPLETED | OUTPATIENT
Start: 2017-09-20 | End: 2017-09-20

## 2017-09-20 RX ADMIN — Medication 1300 MILLIGRAM(S): at 17:19

## 2017-09-20 RX ADMIN — SIMETHICONE 80 MILLIGRAM(S): 80 TABLET, CHEWABLE ORAL at 05:22

## 2017-09-20 RX ADMIN — Medication 100 MILLIGRAM(S): at 17:19

## 2017-09-20 RX ADMIN — HYDROMORPHONE HYDROCHLORIDE 0.5 MILLIGRAM(S): 2 INJECTION INTRAMUSCULAR; INTRAVENOUS; SUBCUTANEOUS at 09:55

## 2017-09-20 RX ADMIN — SODIUM POLYSTYRENE SULFONATE 15 GRAM(S): 4.1 POWDER, FOR SUSPENSION ORAL at 08:49

## 2017-09-20 RX ADMIN — Medication 60 MILLIGRAM(S): at 05:22

## 2017-09-20 RX ADMIN — Medication 2 TABLET(S): at 17:19

## 2017-09-20 RX ADMIN — HYDROMORPHONE HYDROCHLORIDE 0.5 MILLIGRAM(S): 2 INJECTION INTRAMUSCULAR; INTRAVENOUS; SUBCUTANEOUS at 22:30

## 2017-09-20 RX ADMIN — HYDROMORPHONE HYDROCHLORIDE 0.5 MILLIGRAM(S): 2 INJECTION INTRAMUSCULAR; INTRAVENOUS; SUBCUTANEOUS at 18:14

## 2017-09-20 RX ADMIN — Medication 1300 MILLIGRAM(S): at 05:24

## 2017-09-20 RX ADMIN — HYDROMORPHONE HYDROCHLORIDE 0.5 MILLIGRAM(S): 2 INJECTION INTRAMUSCULAR; INTRAVENOUS; SUBCUTANEOUS at 14:16

## 2017-09-20 RX ADMIN — HYDROMORPHONE HYDROCHLORIDE 0.5 MILLIGRAM(S): 2 INJECTION INTRAMUSCULAR; INTRAVENOUS; SUBCUTANEOUS at 22:00

## 2017-09-20 RX ADMIN — HYDROMORPHONE HYDROCHLORIDE 0.5 MILLIGRAM(S): 2 INJECTION INTRAMUSCULAR; INTRAVENOUS; SUBCUTANEOUS at 05:20

## 2017-09-20 RX ADMIN — Medication 1300 MILLIGRAM(S): at 11:06

## 2017-09-20 RX ADMIN — HYDROMORPHONE HYDROCHLORIDE 0.5 MILLIGRAM(S): 2 INJECTION INTRAMUSCULAR; INTRAVENOUS; SUBCUTANEOUS at 18:29

## 2017-09-20 RX ADMIN — Medication 100 MILLIGRAM(S): at 05:22

## 2017-09-20 RX ADMIN — HYDROMORPHONE HYDROCHLORIDE 0.5 MILLIGRAM(S): 2 INJECTION INTRAMUSCULAR; INTRAVENOUS; SUBCUTANEOUS at 09:40

## 2017-09-20 RX ADMIN — HYDROMORPHONE HYDROCHLORIDE 0.5 MILLIGRAM(S): 2 INJECTION INTRAMUSCULAR; INTRAVENOUS; SUBCUTANEOUS at 05:15

## 2017-09-20 RX ADMIN — HYDROMORPHONE HYDROCHLORIDE 0.5 MILLIGRAM(S): 2 INJECTION INTRAMUSCULAR; INTRAVENOUS; SUBCUTANEOUS at 14:01

## 2017-09-20 RX ADMIN — ONDANSETRON 4 MILLIGRAM(S): 8 TABLET, FILM COATED ORAL at 11:03

## 2017-09-20 NOTE — PROGRESS NOTE ADULT - PROBLEM SELECTOR PLAN 6
DVT ppx  - SCDs and encourage ambulation due to recent bleeding  - Renal diet    f/u SW - emergency medicaid. Goal to facilitate medical workup inpt given insurance issues    Trav Domingo, MS 4  Jacobo Ureña MD PGY3 DVT ppx  - SCDs and encourage ambulation due to recent bleeding  - Renal diet    f/u SW - emergency medicaid. Goal to facilitate medical workup inpt given insurance issues. D/C likely tomorrow 9/21    Trav Domingo, MS 4  Jacobo Ureña MD PGY3

## 2017-09-20 NOTE — PROGRESS NOTE ADULT - ASSESSMENT
Patient is a 56 y/o F w/ ESRD on HD who presented to OSH with abdominal pain, found with significant hemoperitoneum likely from rupture of collateral mesenteric vessel, deemed that PD is contraindicated.

## 2017-09-20 NOTE — PROGRESS NOTE ADULT - SUBJECTIVE AND OBJECTIVE BOX
[facility] DIVISION OF KIDNEY DISEASES AND HYPERTENSION -- FOLLOW UP NOTE  --------------------------------------------------------------------------------  Chief Complaint:  ESRD previously on PD    24 hour events/subjective:  patient reports feeling well, has no complaints at this time.        PAST HISTORY  --------------------------------------------------------------------------------  No significant changes to PMH, PSH, FHx, SHx, unless otherwise noted    ALLERGIES & MEDICATIONS  --------------------------------------------------------------------------------  Allergies    No Known Allergies    Intolerances      Standing Inpatient Medications  calcium acetate 667 milliGRAM(s) Oral three times a day with meals  docusate sodium 100 milliGRAM(s) Oral two times a day  lactulose Syrup 5 Gram(s) Oral daily  simethicone 80 milliGRAM(s) Chew two times a day  sodium bicarbonate 1300 milliGRAM(s) Oral three times a day before meals  furosemide    Tablet 60 milliGRAM(s) Oral daily  sodium polystyrene sulfonate Suspension 15 Gram(s) Oral <User Schedule>    PRN Inpatient Medications  HYDROmorphone  Injectable 0.5 milliGRAM(s) IV Push every 4 hours PRN      REVIEW OF SYSTEMS  --------------------------------------------------------------------------------  Gen: No fevers/chills  Skin: No rashes  Head/Eyes/Ears/Mouth: No headache  Respiratory: No dyspnea  CV: No chest pain  GI: No abdominal pain  : No dysuria  MSK: No edema  Neuro: No dizziness/lightheadedness    VITALS/PHYSICAL EXAM  --------------------------------------------------------------------------------  T(C): 37.3 (09-20-17 @ 05:02), Max: 37.3 (09-20-17 @ 05:02)  HR: 93 (09-20-17 @ 05:02) (84 - 96)  BP: 132/79 (09-20-17 @ 05:02) (118/72 - 132/79)  RR: 18 (09-20-17 @ 05:02) (18 - 18)  SpO2: 95% (09-20-17 @ 05:02) (95% - 98%)  Wt(kg): --  Height (cm): 157 (09-18-17 @ 14:52)  Weight (kg): 54.5 (09-18-17 @ 14:52)  BMI (kg/m2): 22.1 (09-18-17 @ 14:52)  BSA (m2): 1.54 (09-18-17 @ 14:52)      09-19-17 @ 07:01  -  09-20-17 @ 07:00  --------------------------------------------------------  IN: 1200 mL / OUT: 1725 mL / NET: -525 mL      Physical Exam:  	Gen: NAD, well-appearing  	HEENT: MMM  	Pulm: CTA B/L  	CV: RRR, S1S2; no rub  	Abd: +BS, soft, nontender/nondistended  	: No suprapubic tenderness  	UE:  no edema  	LE:  no edema  	Neuro: No focal deficits  	Psych: Normal affect and mood  	Skin: Warm  	Vascular access:  LUE AVF +thrill and bruit    LABS/STUDIES  --------------------------------------------------------------------------------              9.3    18.02 >-----------<  422      [09-19-17 @ 07:37]              31.4     141  |  99  |  51  ----------------------------<  134      [09-19-17 @ 19:45]  4.4   |  21  |  5.63        Ca     7.9     [09-19-17 @ 19:45]      Mg     2.3     [09-19-17 @ 07:44]      Phos  7.2     [09-19-17 @ 07:44]    TPro  7.7  /  Alb  3.2  /  TBili  0.6  /  DBili  x   /  AST  25  /  ALT  7   /  AlkPhos  191  [09-19-17 @ 07:44]    PT/INR: PT 13.6 , INR 1.20       [09-18-17 @ 09:36]  PTT: 36.0       [09-18-17 @ 09:36]    Creatinine Trend:  SCr 5.63 [09-19 @ 19:45]  SCr 5.72 [09-19 @ 12:51]  SCr 5.06 [09-19 @ 07:44]  SCr 5.05 [09-18 @ 20:00]  SCr 4.91 [09-18 @ 15:10] [facility] DIVISION OF KIDNEY DISEASES AND HYPERTENSION -- FOLLOW UP NOTE  --------------------------------------------------------------------------------  Chief Complaint:  ESRD previously on PD    24 hour events/subjective:  patient reports nausea, and wretching this morning; also pain LUQ again this morning; now feeling better        PAST HISTORY  --------------------------------------------------------------------------------  No significant changes to PMH, PSH, FHx, SHx, unless otherwise noted    ALLERGIES & MEDICATIONS  --------------------------------------------------------------------------------  Allergies    No Known Allergies    Intolerances      Standing Inpatient Medications  calcium acetate 667 milliGRAM(s) Oral three times a day with meals  docusate sodium 100 milliGRAM(s) Oral two times a day  lactulose Syrup 5 Gram(s) Oral daily  simethicone 80 milliGRAM(s) Chew two times a day  sodium bicarbonate 1300 milliGRAM(s) Oral three times a day before meals  furosemide    Tablet 60 milliGRAM(s) Oral daily  sodium polystyrene sulfonate Suspension 15 Gram(s) Oral <User Schedule>    PRN Inpatient Medications  HYDROmorphone  Injectable 0.5 milliGRAM(s) IV Push every 4 hours PRN      REVIEW OF SYSTEMS  --------------------------------------------------------------------------------  Gen: No fevers/chills  Skin: No rashes  Head/Eyes/Ears/Mouth: No headache  Respiratory: No dyspnea  CV: No chest pain  GI: No abdominal pain  : No dysuria  MSK: No edema  Neuro: No dizziness/lightheadedness    VITALS/PHYSICAL EXAM  --------------------------------------------------------------------------------  T(C): 37.3 (09-20-17 @ 05:02), Max: 37.3 (09-20-17 @ 05:02)  HR: 93 (09-20-17 @ 05:02) (84 - 96)  BP: 132/79 (09-20-17 @ 05:02) (118/72 - 132/79)  RR: 18 (09-20-17 @ 05:02) (18 - 18)  SpO2: 95% (09-20-17 @ 05:02) (95% - 98%)  Wt(kg): --  Height (cm): 157 (09-18-17 @ 14:52)  Weight (kg): 54.5 (09-18-17 @ 14:52)  BMI (kg/m2): 22.1 (09-18-17 @ 14:52)  BSA (m2): 1.54 (09-18-17 @ 14:52)      09-19-17 @ 07:01  -  09-20-17 @ 07:00  --------------------------------------------------------  IN: 1200 mL / OUT: 1725 mL / NET: -525 mL      Physical Exam:  	Gen: NAD, well-appearing  	HEENT: MMM  	Pulm: CTA B/L  	CV: RRR, S1S2; no rub  	Abd: +BS, soft, nontender/nondistended  	: No suprapubic tenderness  	UE:  no edema  	LE:  no edema  	Neuro: No focal deficits  	Psych: Normal affect and mood  	Skin: Warm  	Vascular access:  LUE AVF +thrill and bruit    LABS/STUDIES  --------------------------------------------------------------------------------              9.3    18.02 >-----------<  422      [09-19-17 @ 07:37]              31.4     141  |  99  |  51  ----------------------------<  134      [09-19-17 @ 19:45]  4.4   |  21  |  5.63        Ca     7.9     [09-19-17 @ 19:45]      Mg     2.3     [09-19-17 @ 07:44]      Phos  7.2     [09-19-17 @ 07:44]    TPro  7.7  /  Alb  3.2  /  TBili  0.6  /  DBili  x   /  AST  25  /  ALT  7   /  AlkPhos  191  [09-19-17 @ 07:44]    PT/INR: PT 13.6 , INR 1.20       [09-18-17 @ 09:36]  PTT: 36.0       [09-18-17 @ 09:36]    Creatinine Trend:  SCr 5.63 [09-19 @ 19:45]  SCr 5.72 [09-19 @ 12:51]  SCr 5.06 [09-19 @ 07:44]  SCr 5.05 [09-18 @ 20:00]  SCr 4.91 [09-18 @ 15:10]

## 2017-09-20 NOTE — PROVIDER CONTACT NOTE (OTHER) - SITUATION
Pt refusing SCDs
pt c/o cramping in left hand post hyperkalemia protocol. pt c./o cramping of left thumb

## 2017-09-20 NOTE — DISCHARGE NOTE ADULT - MEDICATION SUMMARY - MEDICATIONS TO TAKE
I will START or STAY ON the medications listed below when I get home from the hospital:    calcium carbonate 500 mg (200 mg elemental calcium) oral tablet, chewable  -- 2 tab(s) by mouth 3 times a day  -- Indication: For Hyperphosphatemia    sodium bicarbonate 650 mg oral tablet  -- 2 tab(s) by mouth 3 times a day (before meals)  -- Indication: For Hyperkalemia    sodium polystyrene sulfonate oral and rectal powder  -- 15 gram(s) by mouth Monday, Wednesday, and Friday   -- Not to be used with sorbitol  Shake well before use.    -- Indication: For Hyperkalemia    furosemide 20 mg oral tablet  -- 3 tab(s) by mouth once a day  -- Indication: For Hyperkalemia    simethicone 80 mg oral tablet, chewable  -- 1 tab(s) by mouth 2 times a day  -- Indication: For Constipation I will START or STAY ON the medications listed below when I get home from the hospital:    Dilaudid 2 mg oral tablet  -- 1 tab(s) by mouth every 8 hours, As Needed for Severe pain MDD:6mg  -- Caution federal law prohibits the transfer of this drug to any person other  than the person for whom it was prescribed.  May cause drowsiness.  Alcohol may intensify this effect.  Use care when operating dangerous machinery.  This prescription cannot be refilled.  Using more of this medication than prescribed may cause serious breathing problems.    -- Indication: For Pain     calcium carbonate 500 mg (200 mg elemental calcium) oral tablet, chewable  -- 2 tab(s) by mouth 3 times a day  -- Indication: For Hyperphosphatemia    sodium bicarbonate 650 mg oral tablet  -- 2 tab(s) by mouth 3 times a day (before meals)  -- Indication: For Hyperkalemia    sodium polystyrene sulfonate oral and rectal powder  -- 15 gram(s) by mouth Monday, Wednesday, and Friday   -- Not to be used with sorbitol  Shake well before use.    -- Indication: For Hyperkalemia    furosemide 20 mg oral tablet  -- 3 tab(s) by mouth once a day  -- Indication: For Hyperkalemia    simethicone 80 mg oral tablet, chewable  -- 1 tab(s) by mouth 2 times a day  -- Indication: For Gas

## 2017-09-20 NOTE — PROGRESS NOTE ADULT - PROBLEM SELECTOR PLAN 3
Possibly 2/2 traction of PD catheter with underlying varices vs. rupture smaller collateral vessel during PD due to extensive collateralization of venous mesenteric system seen on CT A/P. Hgb stable.  - POD#2 from PD cath removal  - c/w daily CBC

## 2017-09-20 NOTE — PROVIDER CONTACT NOTE (OTHER) - ACTION/TREATMENT ORDERED:
encourage foods with potassium at this time, repeat bmp stat, continue to monitor
Educate patient on importance of VTE prevention. Encourage patient to ambulate around bajwa

## 2017-09-20 NOTE — DISCHARGE NOTE ADULT - PATIENT PORTAL LINK FT
“You can access the [employer] Patient Portal, offered by [facility], by registering with the following website: [email or web address]”

## 2017-09-20 NOTE — PROGRESS NOTE ADULT - PROBLEM SELECTOR PLAN 1
No further PD due to predisposition to hemoperitoneum.  Multiple episodes of hyperkalemia this admission managed medically thus far with albuterol/insulin/D50/lasix, now on standing lasix, kayexelate and bicarb.   - POD#2 LUE AVF placement  - Per renal, c/w lasix 60 PO daily, kayexelate 3x/wk and increase bicarb dose to medically manage hyperkalemia given that patient should not require urgent hemodialysis in the setting of euvolemia and good urine output  - c/w phoslo for rising phos  - d/c topete today No further PD due to predisposition to hemoperitoneum.  Multiple episodes of hyperkalemia this admission managed medically thus far with albuterol/insulin/D50/lasix, now on standing lasix, kayexelate and bicarb.   - POD#2 LUE AVF placement  - Per renal, c/w lasix 60 PO daily, kayexelate 3x/wk and increase bicarb dose to medically manage hyperkalemia given that patient should not require urgent hemodialysis in the setting of euvolemia and good urine output  - c/w phoslo for rising phos  - d/c topete today - TOV tonight

## 2017-09-20 NOTE — DISCHARGE NOTE ADULT - MEDICATION SUMMARY - MEDICATIONS TO STOP TAKING
I will STOP taking the medications listed below when I get home from the hospital:    amLODIPine 10 mg oral tablet  -- 1 tab(s) by mouth once a day    aspirin 81 mg oral tablet  -- 1 tab(s) by mouth once a day    hydroCHLOROthiazide 50 mg oral tablet  -- 1 tab(s) by mouth once a day    hydroxyurea 500 mg oral capsule  -- 500 milligram(s) by mouth once a day    labetalol 200 mg oral tablet  -- 1 tab(s) by mouth 2 times a day    traMADol 50 mg oral tablet  -- 1 tab(s) by mouth every 6 hours, As Needed I will STOP taking the medications listed below when I get home from the hospital:    amLODIPine 10 mg oral tablet  -- 1 tab(s) by mouth once a day    aspirin 81 mg oral tablet  -- 1 tab(s) by mouth once a day    oxyCODONE 5 mg oral tablet  -- 1 tab(s) by mouth every 4 hours MDD:6  -- Caution federal law prohibits the transfer of this drug to any person other  than the person for whom it was prescribed.  It is very important that you take or use this exactly as directed.  Do not skip doses or discontinue unless directed by your doctor.  May cause drowsiness.  Alcohol may intensify this effect.  Use care when operating dangerous machinery.  This prescription cannot be refilled.  Using more of this medication than prescribed may cause serious breathing problems.    acetaminophen 325 mg oral tablet  -- 2 tab(s) by mouth every 6 hours, As needed, Mild Pain (1 - 3)    hydroCHLOROthiazide 50 mg oral tablet  -- 1 tab(s) by mouth once a day    hydroxyurea 500 mg oral capsule  -- 500 milligram(s) by mouth once a day    labetalol 200 mg oral tablet  -- 1 tab(s) by mouth 2 times a day    traMADol 50 mg oral tablet  -- 1 tab(s) by mouth every 6 hours, As Needed

## 2017-09-20 NOTE — PROGRESS NOTE ADULT - ASSESSMENT
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 Hospital course c/b hyperkalemia, currently being managed medically.

## 2017-09-20 NOTE — PROVIDER CONTACT NOTE (OTHER) - RECOMMENDATIONS
repeat bmp stat
Educate patient on importance of VTE prevention. Encourage patient to ambulate around bajwa

## 2017-09-20 NOTE — DISCHARGE NOTE ADULT - INSTRUCTIONS
Follow a low potassium diet, avoid high potassium foods such as bananas, spinach, potato, artichoke, and milk Follow a low potassium diet, avoid high potassium foods such as bananas, spinach, potatoes, artichokes, and milk.

## 2017-09-20 NOTE — PROVIDER CONTACT NOTE (OTHER) - BACKGROUND
Pt admitted with pertoneal hematoma, had surgery monday to remove PD catheter.
pt admitted on 9/3/17 with peritonitis. hx esrd PD on hold, htn, peptic ulcer disease

## 2017-09-20 NOTE — DISCHARGE NOTE ADULT - CARE PROVIDER_API CALL
Azucena De León), Internal Medicine; Nephrology  [address] [place] [address]  Phone: [phone]  Fax: [phone]    Giorgio Dempsey (MD), Vascular Surgery  [address] [facility]  [address]  [place] [address]  Phone: [phone]  Fax: [phone]    Jean Anaya), ColonRectal Surgery; Surgery  [address] [place] [address]  Phone: [phone]  Fax: [phone]

## 2017-09-20 NOTE — PROGRESS NOTE ADULT - PROBLEM SELECTOR PLAN 2
Rosette h/h.  Last epogen on 9/15 for 10k units. Honitor h/h.  Last epogen on 9/15 for 10k units.  monitor hb trend

## 2017-09-20 NOTE — DISCHARGE NOTE ADULT - HOSPITAL COURSE
This is a 58 yo F with PMH of HTN, Polycythemia Vera, ESRD previously on Peritoneal Dialysis who was admitted to MICU with hemoperitoneum and resultant acute posthemorrhagic anemia. 1 unit pRBC was transfused, pt was hemodynamically stable without further signs of bleeding and transferred to medicine floor. Her hgb remained stable through the rest of her hospital stay. She was initially treated with cefepime for possible bacterial peritonitis, but this was d/c'ed after cultures were negative. Her PD catheter was removed and a LUE AVF was placed on 9/18/17. She had multiple episodes of hyperkalemia that were managed medically, she remained euvolemic and there were no urgent requirements for dialysis here. She will be discharged on standing lasix, kayexelate and bicarbonate to prevent hyperkalemia, and follow-up with her nephrologist Dr. De León weekly for routine labwork and monitoring of fluid status. She will also follow-up with Dr. Anaya (PD catheter removal) and Dr. Dempsey (LUE AVF placement) in 2 weeks for post-op follow-up. This is a 56 yo F with PMH of HTN, Polycythemia Vera, ESRD previously on Peritoneal Dialysis who was admitted to MICU with hemoperitoneum and resultant acute posthemorrhagic anemia. 1 unit pRBC was transfused, pt was hemodynamically stable without further signs of bleeding and transferred to medicine floor. Her hgb remained stable through the rest of her hospital stay. She was initially treated with cefepime for possible bacterial peritonitis, but this was d/c'ed after peritoneal fluid cultures were negative. Her PD catheter was removed and a LUE AVF was placed on 9/18/17. She had multiple episodes of hyperkalemia that were managed medically, she remained euvolemic and there were no urgent requirements for dialysis here. There was extensive discussion regarding placing a hemodialysis catheter inpatient, however the decision was made to manage her hyperkalemia mecially as she was responsive on multiple occasions.  She will be discharged on standing lasix, kayexelate and bicarbonate to prevent hyperkalemia, and follow-up with her nephrologist Dr. De León weekly for routine labwork and monitoring of fluid status. She will also follow-up with Dr. Anaya (PD catheter removal) and Dr. Dempsey (LUE AVF placement) in 2 weeks for post-op follow-up. This is a 56 yo F with PMH of HTN, Polycythemia Vera, ESRD previously on Peritoneal Dialysis who was admitted to MICU with hemoperitoneum and resultant acute posthemorrhagic anemia. 1 unit pRBC was transfused, pt was hemodynamically stable without further signs of bleeding and transferred to medicine floor. Her hgb remained stable through the rest of her hospital stay. She was initially treated with cefepime for possible bacterial peritonitis, but this was d/c'ed after peritoneal fluid cultures were negative. Her PD catheter was removed and a LUE AVF was placed on 9/18/17. She had multiple episodes of hyperkalemia that were managed medically, she remained euvolemic and there were no urgent requirements for dialysis here. There was extensive discussion regarding placing a hemodialysis catheter inpatient, however the decision was made to manage her hyperkalemia medically as she was responsive on multiple occasions.  She will be discharged on standing lasix, kayexelate and bicarbonate to prevent hyperkalemia, and follow-up with her nephrologist Dr. De León weekly for routine labwork and monitoring of fluid status. She will also follow-up with Dr. Anaya (PD catheter removal) and Dr. Dempsey (LUE AVF placement) in 2 weeks for post-op follow-up.

## 2017-09-20 NOTE — CHART NOTE - NSCHARTNOTEFT_GEN_A_CORE
RD follow up:  57  year old female patient with PMH of ESRD - previously on PD.  Admitted with anemia, HTN, renal osteodystrophy and hyperkalemia. Noted with hemoperitoneum, deemed PD is contraindicated.  Patient had Left AVF placed and PD catheter removed.  Patient not receiving dialysis at this time.    RD visited patient this afternoon and she reported nausea persists. Noted with "wretching" this morning and LUQ pain.  She reported foods eaten over the past few days and nutritional intakes remain poor.  She reports an aversion to meats and stated chicken is usually too dry for her to eat.  RD suggested meal alternates and encouraged patient to try to improve her oral intakes.  She reported good acceptance and tolerance of Nepro, drinking 1 - 2 per day.  RD provided verbal and written renal diet education.  Discussed limiting sodium intakes.  Foods with high potassium and/or high phosphorus content to be avoided were reviewed.  Patient stated that she reviewed this information for foods in her [place] diet.                Source: Patient [X]    Family [ ]     other [ ]    Diet:   DASH, Renal - Nepro 3 x daily      Patient reports [X] nausea  [ ] vomiting [ ] diarrhea [ ] constipation  [ ]chewing problems [ ] swallowing issues  [ ] other:  Noted loose BM on 9/19/17     PO intake:  < 50% [X] 50-75% [ ]   % [ ]  other :     Source for PO intake [X] Patient [ ] family [ ] chart [ ] staff [ ] other      Current Weight:  Weight (kg): 54.5 (09-18 @ 14:52) <= Weight (Kg) 55.1 (09/15/17)     % Weight Change 1% weight loss    Pertinent Medications: MEDICATIONS  (STANDING):  docusate sodium 100 milliGRAM(s) Oral two times a day  lactulose Syrup 5 Gram(s) Oral daily  simethicone 80 milliGRAM(s) Chew two times a day  sodium bicarbonate 1300 milliGRAM(s) Oral three times a day before meals  furosemide    Tablet 60 milliGRAM(s) Oral daily  sodium polystyrene sulfonate Suspension 15 Gram(s) Oral <User Schedule>  calcium carbonate 500 mG (Tums) Chewable 2 Tablet(s) Chew three times a day    MEDICATIONS  (PRN):  HYDROmorphone  Injectable 0.5 milliGRAM(s) IV Push every 4 hours PRN Severe Pain (7 - 10)    Pertinent Labs:  09-20 Na139 mmol/L Glu 94 mg/dL K+ 4.9 mmol/L Cr  6.21 mg/dL<H> BUN 50 mg/dL<H> Phos 6.8 mg/dL<H> Alb 2.9 g/dL<L> PAB n/a                             (9/19/17:  K+ - 7.3)    Skin:  no impairment           Edema: none noted    Estimated Needs:   [X] no change since previous assessment  [ ] recalculated:       Previous Nutrition Diagnosis:     [X] Malnutrition        Nutrition Diagnosis is [X] ongoing  [ ] resolved [ ] not applicable        New Nutrition Diagnosis: [X] not applicable        Recommend    [ ] Change Diet To:    [ ] Nutrition Supplement    [ ] Nutrition Support    [X] Other:   - Continue current diet prescription  - Obtain and honor food preferences within diet prescription and encourage po intakes of meals and oral supplement         Monitoring and Evaluation:     [X] PO intake [X] Tolerance to diet prescription [X] weights [X] follow up per protocol    [ ] other:

## 2017-09-20 NOTE — PROGRESS NOTE ADULT - PROBLEM SELECTOR PLAN 2
Resolving, likely 2/2 peritonitis due to rupture of small peripancreatic varices/collateral venous mesenteric vessels/hematomas seen on CT.  Repeat CT with mildly enlarged hematoma, but overall improvement of hemoperitoneum.  - POD#2 from PD cath removal  - c/w daily CBC  - Monitor off abx as per ID   - c/w bowel regimen

## 2017-09-20 NOTE — PROGRESS NOTE ADULT - SUBJECTIVE AND OBJECTIVE BOX
Patient is a 57y old  Female who presents with a chief complaint of Peritonitis, Hemoperitoneum (03 Sep 2017 20:09)      SUBJECTIVE / OVERNIGHT EVENTS:  No acute events overnight. Pt reports persistent LUE soreness and mild left-sided abdominal pain. She reports one loose bowel movement yesterday. She denies any fevers, chills, chest pain, SOB, n/v, swelling or skin changes.    MEDICATIONS  (STANDING):  calcium acetate 667 milliGRAM(s) Oral three times a day with meals  docusate sodium 100 milliGRAM(s) Oral two times a day  lactulose Syrup 5 Gram(s) Oral daily  simethicone 80 milliGRAM(s) Chew two times a day  sodium bicarbonate 1300 milliGRAM(s) Oral three times a day before meals  furosemide    Tablet 60 milliGRAM(s) Oral daily  sodium polystyrene sulfonate Suspension 15 Gram(s) Oral <User Schedule>    MEDICATIONS  (PRN):  HYDROmorphone  Injectable 0.5 milliGRAM(s) IV Push every 4 hours PRN Severe Pain (7 - 10)    Vital Signs Last 24 Hrs  T(C): 37.3 (20 Sep 2017 05:02), Max: 37.3 (20 Sep 2017 05:02)  T(F): 99.2 (20 Sep 2017 05:02), Max: 99.2 (20 Sep 2017 05:02)  HR: 93 (20 Sep 2017 05:02) (84 - 96)  BP: 132/79 (20 Sep 2017 05:02) (118/72 - 132/79)  BP(mean): --  RR: 18 (20 Sep 2017 05:02) (18 - 18)  SpO2: 95% (20 Sep 2017 05:02) (95% - 98%)      I&O's Summary    19 Sep 2017 07:01  -  20 Sep 2017 07:00  --------------------------------------------------------  IN: 1200 mL / OUT: 1725 mL / NET: -525 mL      REVIEW OF SYSTEMS    General: No fevers/chills  Skin/Breast: No rash  Ophthalmologic: No vision changes  ENMT:No dysphagia or odynophagia  Respiratory and Thorax: No SOB, wheezing, cough  Cardiovascular: No chest pain or palpitations  Gastrointestinal: as per HPI  Genitourinary:  No dysuria, No change in urinary frequency  Musculoskeletal: LUE soreness, no joint pain.  Neurological: No focal deficits, No headache    PHYSICAL EXAM:  GENERAL: NAD, well-developed  HEAD:  Atraumatic, Normocephalic  EYES: EOMI, PERRLA, conjunctiva and sclera clear  NECK: Supple, No JVD  CHEST/LUNG: Clear to auscultation bilaterally; No wheeze  HEART: Regular rate and rhythm; No murmurs, rubs, or gallops  ABDOMEN: Soft, mild left sided abd TTP, PD cath removal site C/D/I without erythema or signs of infection, nondistended; Bowel sounds present.   EXTREMITIES: LUE tenderness around incision site, no erythema, no hematoma, no active bleeding, surgical site C/D/I, audible bruit and palpable thrill, small amount of serosanguinous fluid in NATALIIA drain, good radial pulses bilaterally, sensation intact to light touch distally    LABS:                        9.3    18.02 )-----------( 422      ( 19 Sep 2017 07:37 )             31.4     09-19    141  |  99  |  51<H>  ----------------------------<  134<H>  4.4   |  21<L>  |  5.63<H>    Ca    7.9<L>      19 Sep 2017 19:45  Phos  7.2     09-19  Mg     2.3     09-19    TPro  7.7  /  Alb  3.2<L>  /  TBili  0.6  /  DBili  x   /  AST  25  /  ALT  7<L>  /  AlkPhos  191<H>  09-19    PT/INR - ( 18 Sep 2017 09:36 )   PT: 13.6 sec;   INR: 1.20 ratio         PTT - ( 18 Sep 2017 09:36 )  PTT:36.0 sec Patient is a 57y old  Female who presents with a chief complaint of Peritonitis, Hemoperitoneum (03 Sep 2017 20:09)      SUBJECTIVE / OVERNIGHT EVENTS:  No acute events overnight. Pt reports persistent LUE soreness and mild left-sided abdominal pain. She reports one loose bowel movement yesterday. She denies any fevers, chills, chest pain, SOB, n/v, swelling or skin changes.    MEDICATIONS  (STANDING):  calcium acetate 667 milliGRAM(s) Oral three times a day with meals  docusate sodium 100 milliGRAM(s) Oral two times a day  lactulose Syrup 5 Gram(s) Oral daily  simethicone 80 milliGRAM(s) Chew two times a day  sodium bicarbonate 1300 milliGRAM(s) Oral three times a day before meals  furosemide    Tablet 60 milliGRAM(s) Oral daily  sodium polystyrene sulfonate Suspension 15 Gram(s) Oral <User Schedule>    MEDICATIONS  (PRN):  HYDROmorphone  Injectable 0.5 milliGRAM(s) IV Push every 4 hours PRN Severe Pain (7 - 10)    Vital Signs Last 24 Hrs  T(C): 37.3 (20 Sep 2017 05:02), Max: 37.3 (20 Sep 2017 05:02)  T(F): 99.2 (20 Sep 2017 05:02), Max: 99.2 (20 Sep 2017 05:02)  HR: 93 (20 Sep 2017 05:02) (84 - 96)  BP: 132/79 (20 Sep 2017 05:02) (118/72 - 132/79)  BP(mean): --  RR: 18 (20 Sep 2017 05:02) (18 - 18)  SpO2: 95% (20 Sep 2017 05:02) (95% - 98%)      I&O's Summary    19 Sep 2017 07:01  -  20 Sep 2017 07:00  --------------------------------------------------------  IN: 1200 mL / OUT: 1725 mL / NET: -525 mL      PHYSICAL EXAM:  GENERAL: NAD, well-developed  HEAD:  Atraumatic, Normocephalic  EYES: EOMI, PERRLA, conjunctiva and sclera clear  NECK: Supple, No JVD  CHEST/LUNG: Clear to auscultation bilaterally; No wheeze  HEART: Regular rate and rhythm; No murmurs, rubs, or gallops  ABDOMEN: Soft, mild left sided abd TTP, PD cath removal site C/D/I without erythema or signs of infection, nondistended; Bowel sounds present.   EXTREMITIES: LUE tenderness around incision site, no erythema, no hematoma, no active bleeding, surgical site C/D/I, audible bruit and palpable thrill, good radial pulses bilaterally, sensation intact to light touch distally    LABS:                         8.7    15.97 )-----------( 489      ( 20 Sep 2017 09:06 )             30.4     09-20    139  |  99  |  50<H>  ----------------------------<  94  4.9   |  19<L>  |  6.21<H>    Ca    7.8<L>      20 Sep 2017 09:06  Phos  6.8     09-20  Mg     2.1     09-20    TPro  7.2  /  Alb  2.9<L>  /  TBili  0.8  /  DBili  x   /  AST  25  /  ALT  <4<L>  /  AlkPhos  159<H>  09-20    PT/INR - ( 20 Sep 2017 09:03 )   PT: 14.1 sec;   INR: 1.24 ratio         PTT - ( 20 Sep 2017 09:03 )  PTT:35.7 sec      Labs reviewed remarkable for decr wbc and K stable.

## 2017-09-20 NOTE — PROGRESS NOTE ADULT - PROBLEM SELECTOR PLAN 1
PD discontinued due to hemoperitoneum.  Managing electrolytes and volume medically with ESRD level renal function.  s/p AVF placement with excellent flow. PD discontinued due to hemoperitoneum.  Managing electrolytes and volume medically with ESRD level renal function and treating now as CKD Stage V.  s/p AVF placement with excellent flow. Will monitor closely

## 2017-09-20 NOTE — CHART NOTE - NSCHARTNOTESELECT_GEN_ALL_CORE
Event Note
Event Note/MAR Accept Note
MICU transfer note/Event Note
Nutrition Services
Nutrition Services
Surgery/Event Note
Vascular Sx Post Op Note

## 2017-09-20 NOTE — DISCHARGE NOTE ADULT - MEDICATION SUMMARY - MEDICATIONS TO CHANGE
I will SWITCH the dose or number of times a day I take the medications listed below when I get home from the hospital:    Lasix 40 mg oral tablet  -- 1 tab(s) by mouth 2 times a day

## 2017-09-20 NOTE — DISCHARGE NOTE ADULT - ADDITIONAL INSTRUCTIONS
- Follow-up with Dr. Anaya in 2 weeks regarding PD catheter removal  - Follow-up with Dr. Dempsey ([phone]) in 2 weeks regarding left arm AVF  - Continue to take your medications as prescribed to prevent electrolyte or fluid abnormalities in the setting of your kidney disease  - Follow-up with Dr. De León weekly to monitor for electrolyte or fluid abnormalities  - Eat a low-potassium diet

## 2017-09-20 NOTE — PROGRESS NOTE ADULT - PROBLEM SELECTOR PLAN 4
Normotensive currently, labetalol/HCTZ/amlodipine currently held as patient hypotensive previously, patient also receiving lasix for hyperkalemia leading to decrease BP  - Holding BP meds as patient was hypotensive previously, and currently normotensive

## 2017-09-20 NOTE — DISCHARGE NOTE ADULT - CARE PLAN
Principal Discharge DX:	Hemoperitoneum as complication of peritoneal dialysis  Secondary Diagnosis:	ESRD (end stage renal disease)  Secondary Diagnosis:	Hypertension  Secondary Diagnosis:	Polycythemia vera Principal Discharge DX:	Hemoperitoneum as complication of peritoneal dialysis  Goal:	Resolved, prevent further intraabdominal bleeding  Instructions for follow-up, activity and diet:	You originally came to the hospital due to bleeding from your peritoneal dialysis catheter, which we believe was due to bleeding from dilated veins in your abdomen caused by traction from your peritoneal dialysis catheter. This catheter was removed surgically. Please follow-up with general surgeon Dr. Anaya in 2 weeks for follow-up on this procedure and staple removal. Please return to the hospital for any signs of bleeding or infection (warmth, redness, swelling, pain) around the surgical site.  Secondary Diagnosis:	ESRD (end stage renal disease)  Goal:	Maintain normal electrolyte and fluid levels.  Instructions for follow-up, activity and diet:	You had an AV fistula placed in your left arm after your peritoneal dialysis catheter was removed, which can be used in 2-3 months for hemodialysis should you require it. Your potassium level was elevated several times in the hospital which was treated with several medications. Please follow a low potassium diet as mentioned above, continue to take medications (lasix, kayexelate, bicarbonate) to prevent increases in your potassium level, and follow-up with Dr. Dempsey in 2 weeks for further monitoring/management of your left arm AV fistula, and Dr. De León every week for routine labwork to determine if you will need dialysis. Please continue to take calcium carbonate (tums) with your meals to prevent high phosphate levels  Secondary Diagnosis:	Hypertension  Goal:	Maintain normal blood pressure  Instructions for follow-up, activity and diet:	Your blood pressure was normal in the hospital while not taking any blood pressure medications. You will not need to take blood pressure medications when you return home, but please follow-up blood pressure checks with Dr. De León weekly to determine if you will need to restart any of your blood pressure medications.  Secondary Diagnosis:	Polycythemia vera  Goal:	Maintain normal hemoglobin level  Instructions for follow-up, activity and diet:	Your hemoglobin and red blood cell counts were stable while in the hospital. Your hydroxyurea was stopped due to your low red blood cell count and worsening kidney function according to your outpatient records. You do not need to restart hydroxyurea, please follow-up with your primary doctor to have your blood cell counts monitored regularly. Principal Discharge DX:	Hemoperitoneum as complication of peritoneal dialysis  Goal:	Resolved, prevent further intraabdominal bleeding  Instructions for follow-up, activity and diet:	You originally came to the hospital due to bleeding from your peritoneal dialysis catheter, which we believe was due to bleeding from dilated veins in your abdomen caused by traction from your peritoneal dialysis catheter. This catheter was removed surgically. Please follow-up with general surgeon Dr. Anaya in 2 weeks for follow-up on this procedure and staple removal. Please return to the hospital for any signs of bleeding or infection (warmth, redness, swelling, pain) around the surgical site.  Secondary Diagnosis:	ESRD (end stage renal disease)  Goal:	Maintain normal electrolyte and fluid levels.  Instructions for follow-up, activity and diet:	You had an AV fistula placed in your left arm after your peritoneal dialysis catheter was removed, which can be used in 2-3 months for hemodialysis should you require it. Your potassium level was elevated several times in the hospital which was treated with several medications. Please follow a low potassium diet as mentioned above, continue to take medications (lasix, kayexelate, bicarbonate) to prevent increases in your potassium level, and follow-up with Dr. Dempsey in 2 weeks for further monitoring/management of your left arm AV fistula, and Dr. De León every week for routine labwork to determine if you will need dialysis. Please continue to take calcium carbonate (tums) with your meals to prevent high phosphate levels.  Secondary Diagnosis:	Hypertension  Goal:	Maintain normal blood pressure  Instructions for follow-up, activity and diet:	Your blood pressure was normal in the hospital while not taking any blood pressure medications. You will not need to take blood pressure medications when you return home, but please follow-up blood pressure checks with Dr. De León weekly to determine if you will need to restart any of your blood pressure medications.  Secondary Diagnosis:	Polycythemia vera  Goal:	Maintain normal hemoglobin level  Instructions for follow-up, activity and diet:	Your hemoglobin and red blood cell counts were stable while in the hospital. Your hydroxyurea was stopped due to your low red blood cell count and worsening kidney function according to your outpatient records. You do not need to restart hydroxyurea, please follow-up with your primary doctor to have your blood cell counts monitored regularly. Principal Discharge DX:	Hemoperitoneum as complication of peritoneal dialysis  Goal:	Resolved, prevent further intraabdominal bleeding  Instructions for follow-up, activity and diet:	You originally came to the hospital due to bleeding from your peritoneal dialysis catheter, which we believe was due to bleeding from dilated veins in your abdomen caused by traction from your peritoneal dialysis catheter. This catheter was removed surgically. Because of this, you cannot receive dialysis through the peritoneum any more. Please follow-up with general surgeon Dr. Anaya in 2 weeks for follow-up on this procedure and staple removal. Please return to the hospital for any signs of bleeding or infection (warmth, redness, swelling, pain) around the surgical site.  Secondary Diagnosis:	ESRD (end stage renal disease)  Goal:	Maintain normal electrolyte and fluid levels.  Instructions for follow-up, activity and diet:	You had an AV fistula placed in your left arm after your peritoneal dialysis catheter was removed. Your potassium level was elevated several times in the hospital which was treated with several medications. Please follow a low potassium diet as mentioned above, continue to take medications (lasix, kayexelate) to prevent increases in your potassium level, and follow-up with Dr. Dempsey in 2 weeks for further monitoring/management of your left arm AV fistula, and Dr. De León every week for routine labwork to determine if you will need dialysis. Please continue to take calcium with your meals to prevent high phosphate levels. Follow up with your nephrologist for refills.  Secondary Diagnosis:	Hypertension  Goal:	Maintain normal blood pressure  Instructions for follow-up, activity and diet:	Your blood pressure was normal in the hospital while not taking any blood pressure medications. You will not need to take blood pressure medications when you return home, but please follow-up blood pressure checks with Dr. De León weekly to determine if you will need to restart any of your blood pressure medications.  Secondary Diagnosis:	Polycythemia vera  Goal:	Maintain normal hemoglobin level  Instructions for follow-up, activity and diet:	Your hemoglobin and red blood cell counts were stable while in the hospital. Your hydroxyurea was stopped due to your low red blood cell count and worsening kidney function according to your outpatient records. Please follow-up with your primary doctor to have your blood cell counts monitored regularly and discuss the hydroxyurea.

## 2017-09-20 NOTE — DISCHARGE NOTE ADULT - PLAN OF CARE
Resolved, prevent further intraabdominal bleeding You originally came to the hospital due to bleeding from your peritoneal dialysis catheter, which we believe was due to bleeding from dilated veins in your abdomen caused by traction from your peritoneal dialysis catheter. This catheter was removed surgically. Please follow-up with general surgeon Dr. Anaya in 2 weeks for follow-up on this procedure and staple removal. Please return to the hospital for any signs of bleeding or infection (warmth, redness, swelling, pain) around the surgical site. Maintain normal electrolyte and fluid levels. You had an AV fistula placed in your left arm after your peritoneal dialysis catheter was removed, which can be used in 2-3 months for hemodialysis should you require it. Your potassium level was elevated several times in the hospital which was treated with several medications. Please follow a low potassium diet as mentioned above, continue to take medications (lasix, kayexelate, bicarbonate) to prevent increases in your potassium level, and follow-up with Dr. Dempsey in 2 weeks for further monitoring/management of your left arm AV fistula, and Dr. De León every week for routine labwork to determine if you will need dialysis. Please continue to take calcium carbonate (tums) with your meals to prevent high phosphate levels Maintain normal blood pressure Your blood pressure was normal in the hospital while not taking any blood pressure medications. You will not need to take blood pressure medications when you return home, but please follow-up blood pressure checks with Dr. De León weekly to determine if you will need to restart any of your blood pressure medications. Maintain normal hemoglobin level Your hemoglobin and red blood cell counts were stable while in the hospital. Your hydroxyurea was stopped due to your low red blood cell count and worsening kidney function according to your outpatient records. You do not need to restart hydroxyurea, please follow-up with your primary doctor to have your blood cell counts monitored regularly. You had an AV fistula placed in your left arm after your peritoneal dialysis catheter was removed, which can be used in 2-3 months for hemodialysis should you require it. Your potassium level was elevated several times in the hospital which was treated with several medications. Please follow a low potassium diet as mentioned above, continue to take medications (lasix, kayexelate, bicarbonate) to prevent increases in your potassium level, and follow-up with Dr. Dempsey in 2 weeks for further monitoring/management of your left arm AV fistula, and Dr. De León every week for routine labwork to determine if you will need dialysis. Please continue to take calcium carbonate (tums) with your meals to prevent high phosphate levels. You originally came to the hospital due to bleeding from your peritoneal dialysis catheter, which we believe was due to bleeding from dilated veins in your abdomen caused by traction from your peritoneal dialysis catheter. This catheter was removed surgically. Because of this, you cannot receive dialysis through the peritoneum any more. Please follow-up with general surgeon Dr. Anaya in 2 weeks for follow-up on this procedure and staple removal. Please return to the hospital for any signs of bleeding or infection (warmth, redness, swelling, pain) around the surgical site. You had an AV fistula placed in your left arm after your peritoneal dialysis catheter was removed. Your potassium level was elevated several times in the hospital which was treated with several medications. Please follow a low potassium diet as mentioned above, continue to take medications (lasix, kayexelate) to prevent increases in your potassium level, and follow-up with Dr. Dempsey in 2 weeks for further monitoring/management of your left arm AV fistula, and Dr. De León every week for routine labwork to determine if you will need dialysis. Please continue to take calcium with your meals to prevent high phosphate levels. Follow up with your nephrologist for refills. Your hemoglobin and red blood cell counts were stable while in the hospital. Your hydroxyurea was stopped due to your low red blood cell count and worsening kidney function according to your outpatient records. Please follow-up with your primary doctor to have your blood cell counts monitored regularly and discuss the hydroxyurea.

## 2017-09-20 NOTE — DISCHARGE NOTE ADULT - CARE PROVIDERS DIRECT ADDRESSES
[email or web address]

## 2017-09-20 NOTE — PROGRESS NOTE ADULT - PROBLEM SELECTOR PLAN 5
kayexalate 15g every other day  lasix 60mg po daily  bicarb tabs 1300mg po tid  Hyperkalemia medically treated.  Clear for discharge from renal perspective, follow-up with Dr. De León.  Should have BMP within one week of discharge. kayexalate 15g every other day  lasix 60mg po daily  bicarb tabs 1300mg po tid  k stable today  Hyperkalemia medically treated.  Clear for discharge from renal perspective, follow-up with Dr. De León.  Should have BMP within one week of discharge.

## 2017-09-21 VITALS
HEART RATE: 80 BPM | DIASTOLIC BLOOD PRESSURE: 68 MMHG | TEMPERATURE: 98 F | RESPIRATION RATE: 18 BRPM | SYSTOLIC BLOOD PRESSURE: 112 MMHG | OXYGEN SATURATION: 98 %

## 2017-09-21 LAB
ALBUMIN SERPL ELPH-MCNC: 3.1 G/DL — LOW (ref 3.3–5)
ALP SERPL-CCNC: 170 U/L — HIGH (ref 40–120)
ALT FLD-CCNC: 4 U/L — LOW (ref 10–45)
ANION GAP SERPL CALC-SCNC: 22 MMOL/L — HIGH (ref 5–17)
ANISOCYTOSIS BLD QL: SLIGHT — SIGNIFICANT CHANGE UP
AST SERPL-CCNC: 19 U/L — SIGNIFICANT CHANGE UP (ref 10–40)
BASOPHILS # BLD AUTO: 0.07 K/UL — SIGNIFICANT CHANGE UP (ref 0–0.2)
BASOPHILS NFR BLD AUTO: 0.4 % — SIGNIFICANT CHANGE UP (ref 0–2)
BILIRUB SERPL-MCNC: 1.3 MG/DL — HIGH (ref 0.2–1.2)
BUN SERPL-MCNC: 58 MG/DL — HIGH (ref 7–23)
CALCIUM SERPL-MCNC: 8.4 MG/DL — SIGNIFICANT CHANGE UP (ref 8.4–10.5)
CHLORIDE SERPL-SCNC: 97 MMOL/L — SIGNIFICANT CHANGE UP (ref 96–108)
CO2 SERPL-SCNC: 21 MMOL/L — LOW (ref 22–31)
CREAT SERPL-MCNC: 6.2 MG/DL — HIGH (ref 0.5–1.3)
EOSINOPHIL # BLD AUTO: 0.38 K/UL — SIGNIFICANT CHANGE UP (ref 0–0.5)
EOSINOPHIL NFR BLD AUTO: 2.1 % — SIGNIFICANT CHANGE UP (ref 0–6)
GLUCOSE SERPL-MCNC: 80 MG/DL — SIGNIFICANT CHANGE UP (ref 70–99)
HCT VFR BLD CALC: 30.5 % — LOW (ref 34.5–45)
HGB BLD-MCNC: 9 G/DL — LOW (ref 11.5–15.5)
IMM GRANULOCYTES NFR BLD AUTO: 0.5 % — SIGNIFICANT CHANGE UP (ref 0–1.5)
LYMPHOCYTES # BLD AUTO: 1.74 K/UL — SIGNIFICANT CHANGE UP (ref 1–3.3)
LYMPHOCYTES # BLD AUTO: 9.8 % — LOW (ref 13–44)
MAGNESIUM SERPL-MCNC: 2 MG/DL — SIGNIFICANT CHANGE UP (ref 1.6–2.6)
MANUAL SMEAR VERIFICATION: SIGNIFICANT CHANGE UP
MCHC RBC-ENTMCNC: 22.2 PG — LOW (ref 27–34)
MCHC RBC-ENTMCNC: 29.5 GM/DL — LOW (ref 32–36)
MCV RBC AUTO: 75.3 FL — LOW (ref 80–100)
MONOCYTES # BLD AUTO: 0.73 K/UL — SIGNIFICANT CHANGE UP (ref 0–0.9)
MONOCYTES NFR BLD AUTO: 4.1 % — SIGNIFICANT CHANGE UP (ref 2–14)
NEUTROPHILS # BLD AUTO: 14.68 K/UL — HIGH (ref 1.8–7.4)
NEUTROPHILS NFR BLD AUTO: 83.1 % — HIGH (ref 43–77)
PHOSPHATE SERPL-MCNC: 7.2 MG/DL — HIGH (ref 2.5–4.5)
PLAT MORPH BLD: NORMAL — SIGNIFICANT CHANGE UP
PLATELET # BLD AUTO: 495 K/UL — HIGH (ref 150–400)
POTASSIUM SERPL-MCNC: 4.5 MMOL/L — SIGNIFICANT CHANGE UP (ref 3.5–5.3)
POTASSIUM SERPL-SCNC: 4.5 MMOL/L — SIGNIFICANT CHANGE UP (ref 3.5–5.3)
PROT SERPL-MCNC: 7.1 G/DL — SIGNIFICANT CHANGE UP (ref 6–8.3)
RBC # BLD: 4.05 M/UL — SIGNIFICANT CHANGE UP (ref 3.8–5.2)
RBC # FLD: 21.1 % — HIGH (ref 10.3–14.5)
RBC BLD AUTO: ABNORMAL
SODIUM SERPL-SCNC: 140 MMOL/L — SIGNIFICANT CHANGE UP (ref 135–145)
WBC # BLD: 17.68 K/UL — HIGH (ref 3.8–10.5)
WBC # FLD AUTO: 17.68 K/UL — HIGH (ref 3.8–10.5)

## 2017-09-21 PROCEDURE — 94640 AIRWAY INHALATION TREATMENT: CPT

## 2017-09-21 PROCEDURE — 74176 CT ABD & PELVIS W/O CONTRAST: CPT

## 2017-09-21 PROCEDURE — 83735 ASSAY OF MAGNESIUM: CPT

## 2017-09-21 PROCEDURE — 82330 ASSAY OF CALCIUM: CPT

## 2017-09-21 PROCEDURE — 86901 BLOOD TYPING SEROLOGIC RH(D): CPT

## 2017-09-21 PROCEDURE — 83605 ASSAY OF LACTIC ACID: CPT

## 2017-09-21 PROCEDURE — 82803 BLOOD GASES ANY COMBINATION: CPT

## 2017-09-21 PROCEDURE — 85027 COMPLETE CBC AUTOMATED: CPT

## 2017-09-21 PROCEDURE — 87070 CULTURE OTHR SPECIMN AEROBIC: CPT

## 2017-09-21 PROCEDURE — 86850 RBC ANTIBODY SCREEN: CPT

## 2017-09-21 PROCEDURE — 84295 ASSAY OF SERUM SODIUM: CPT

## 2017-09-21 PROCEDURE — 81001 URINALYSIS AUTO W/SCOPE: CPT

## 2017-09-21 PROCEDURE — 80053 COMPREHEN METABOLIC PANEL: CPT

## 2017-09-21 PROCEDURE — 93971 EXTREMITY STUDY: CPT

## 2017-09-21 PROCEDURE — 87040 BLOOD CULTURE FOR BACTERIA: CPT

## 2017-09-21 PROCEDURE — 85730 THROMBOPLASTIN TIME PARTIAL: CPT

## 2017-09-21 PROCEDURE — 86900 BLOOD TYPING SEROLOGIC ABO: CPT

## 2017-09-21 PROCEDURE — C1889: CPT

## 2017-09-21 PROCEDURE — 71045 X-RAY EXAM CHEST 1 VIEW: CPT

## 2017-09-21 PROCEDURE — 84132 ASSAY OF SERUM POTASSIUM: CPT

## 2017-09-21 PROCEDURE — 81025 URINE PREGNANCY TEST: CPT

## 2017-09-21 PROCEDURE — 99233 SBSQ HOSP IP/OBS HIGH 50: CPT | Mod: GC

## 2017-09-21 PROCEDURE — 93005 ELECTROCARDIOGRAM TRACING: CPT

## 2017-09-21 PROCEDURE — 82947 ASSAY GLUCOSE BLOOD QUANT: CPT

## 2017-09-21 PROCEDURE — 85014 HEMATOCRIT: CPT

## 2017-09-21 PROCEDURE — 82435 ASSAY OF BLOOD CHLORIDE: CPT

## 2017-09-21 PROCEDURE — 84100 ASSAY OF PHOSPHORUS: CPT

## 2017-09-21 PROCEDURE — 99239 HOSP IP/OBS DSCHRG MGMT >30: CPT

## 2017-09-21 PROCEDURE — 80048 BASIC METABOLIC PNL TOTAL CA: CPT

## 2017-09-21 PROCEDURE — 85610 PROTHROMBIN TIME: CPT

## 2017-09-21 PROCEDURE — 74177 CT ABD & PELVIS W/CONTRAST: CPT

## 2017-09-21 RX ORDER — TRAMADOL HYDROCHLORIDE 50 MG/1
1 TABLET ORAL
Qty: 0 | Refills: 0 | COMMUNITY

## 2017-09-21 RX ORDER — CALCIUM CARBONATE 500(1250)
2 TABLET ORAL
Qty: 180 | Refills: 0 | OUTPATIENT
Start: 2017-09-21 | End: 2017-10-21

## 2017-09-21 RX ORDER — SODIUM POLYSTYRENE SULFONATE 4.1 MEQ/G
15 POWDER, FOR SUSPENSION ORAL
Qty: 200 | Refills: 0 | OUTPATIENT
Start: 2017-09-21 | End: 2017-10-06

## 2017-09-21 RX ORDER — SODIUM BICARBONATE 1 MEQ/ML
2 SYRINGE (ML) INTRAVENOUS
Qty: 180 | Refills: 0 | OUTPATIENT
Start: 2017-09-21 | End: 2017-10-21

## 2017-09-21 RX ORDER — FUROSEMIDE 40 MG
1 TABLET ORAL
Qty: 0 | Refills: 0 | COMMUNITY

## 2017-09-21 RX ORDER — ASPIRIN/CALCIUM CARB/MAGNESIUM 324 MG
1 TABLET ORAL
Qty: 0 | Refills: 0 | COMMUNITY

## 2017-09-21 RX ORDER — AMLODIPINE BESYLATE 2.5 MG/1
1 TABLET ORAL
Qty: 30 | Refills: 0 | COMMUNITY

## 2017-09-21 RX ORDER — HYDROMORPHONE HYDROCHLORIDE 2 MG/ML
1 INJECTION INTRAMUSCULAR; INTRAVENOUS; SUBCUTANEOUS
Qty: 21 | Refills: 0 | OUTPATIENT
Start: 2017-09-21 | End: 2017-09-28

## 2017-09-21 RX ORDER — HYDROXYUREA 500 MG/1
500 CAPSULE ORAL
Qty: 0 | Refills: 0 | COMMUNITY

## 2017-09-21 RX ORDER — SODIUM BICARBONATE 1 MEQ/ML
1 SYRINGE (ML) INTRAVENOUS
Qty: 0 | Refills: 0 | COMMUNITY

## 2017-09-21 RX ORDER — SIMETHICONE 80 MG/1
1 TABLET, CHEWABLE ORAL
Qty: 60 | Refills: 0 | OUTPATIENT
Start: 2017-09-21 | End: 2017-10-21

## 2017-09-21 RX ORDER — LABETALOL HCL 100 MG
1 TABLET ORAL
Qty: 0 | Refills: 0 | COMMUNITY

## 2017-09-21 RX ORDER — FUROSEMIDE 40 MG
3 TABLET ORAL
Qty: 90 | Refills: 0 | OUTPATIENT
Start: 2017-09-21 | End: 2017-10-21

## 2017-09-21 RX ADMIN — HYDROMORPHONE HYDROCHLORIDE 0.5 MILLIGRAM(S): 2 INJECTION INTRAMUSCULAR; INTRAVENOUS; SUBCUTANEOUS at 10:48

## 2017-09-21 RX ADMIN — Medication 1300 MILLIGRAM(S): at 08:19

## 2017-09-21 RX ADMIN — HYDROMORPHONE HYDROCHLORIDE 0.5 MILLIGRAM(S): 2 INJECTION INTRAMUSCULAR; INTRAVENOUS; SUBCUTANEOUS at 02:30

## 2017-09-21 RX ADMIN — HYDROMORPHONE HYDROCHLORIDE 0.5 MILLIGRAM(S): 2 INJECTION INTRAMUSCULAR; INTRAVENOUS; SUBCUTANEOUS at 17:14

## 2017-09-21 RX ADMIN — HYDROMORPHONE HYDROCHLORIDE 0.5 MILLIGRAM(S): 2 INJECTION INTRAMUSCULAR; INTRAVENOUS; SUBCUTANEOUS at 08:40

## 2017-09-21 RX ADMIN — HYDROMORPHONE HYDROCHLORIDE 0.5 MILLIGRAM(S): 2 INJECTION INTRAMUSCULAR; INTRAVENOUS; SUBCUTANEOUS at 06:08

## 2017-09-21 RX ADMIN — Medication 100 MILLIGRAM(S): at 05:17

## 2017-09-21 RX ADMIN — Medication 60 MILLIGRAM(S): at 05:17

## 2017-09-21 RX ADMIN — Medication 1300 MILLIGRAM(S): at 12:39

## 2017-09-21 RX ADMIN — HYDROMORPHONE HYDROCHLORIDE 0.5 MILLIGRAM(S): 2 INJECTION INTRAMUSCULAR; INTRAVENOUS; SUBCUTANEOUS at 15:17

## 2017-09-21 RX ADMIN — Medication 2 TABLET(S): at 13:32

## 2017-09-21 RX ADMIN — HYDROMORPHONE HYDROCHLORIDE 0.5 MILLIGRAM(S): 2 INJECTION INTRAMUSCULAR; INTRAVENOUS; SUBCUTANEOUS at 02:08

## 2017-09-21 RX ADMIN — HYDROMORPHONE HYDROCHLORIDE 0.5 MILLIGRAM(S): 2 INJECTION INTRAMUSCULAR; INTRAVENOUS; SUBCUTANEOUS at 10:27

## 2017-09-21 NOTE — PROGRESS NOTE ADULT - PROBLEM SELECTOR PLAN 1
PD catheter removed, LUE AVF placed (9/18).  Multiple episodes of hyperkalemia this admission managed medically thus far with albuterol/insulin/D50/lasix, now on standing lasix, kayexelate and bicarb.   - POD#3 LUE AVF placement  - Per renal, c/w lasix 60 PO daily, kayexelate 3x/wk and increase bicarb dose to medically manage hyperkalemia given that patient should not require urgent hemodialysis in the setting of euvolemia and good urine output  - c/w phoslo for rising phos

## 2017-09-21 NOTE — PROGRESS NOTE ADULT - PROBLEM SELECTOR PLAN 6
DVT ppx  - SCDs and encourage ambulation due to recent bleeding  - Renal diet    f/u SW - emergency medicaid, D/C today    Trav Domingo, MS 4    Sara Zurita MD  PGY-3, Medicine Team 3  pager: [phone]

## 2017-09-21 NOTE — PROGRESS NOTE ADULT - ATTENDING COMMENTS
I have seen this patient with the fellow and agree with their assessment and plan. Not on PD anymore given the recent hemoperitoneium and concern for more bleeding from the collaterals in her abdomen/pelvis. At this point, due to diuretics, MIRIAN ensued and she has a rising crt. Overall, not uremic on exam and if crt stabalizes today, can be dc to f/u with Dr De León. If crt worsens and worsening hyperkalemia, might need to start HD before discharge.    Yael Aranda MD  Cell   Pager   Office

## 2017-09-21 NOTE — PROGRESS NOTE ADULT - PROBLEM SELECTOR PLAN 5
kayexalate 15g every other day  lasix 60mg po daily  bicarb tabs 1300mg po tid  k stable today  Hyperkalemia medically treated.  Clear for discharge from renal perspective, follow-up with Dr. De León.  Should have BMP within one week of discharge.

## 2017-09-21 NOTE — PROGRESS NOTE ADULT - PROBLEM SELECTOR PLAN 2
Resolved, likely 2/2 peritonitis due to rupture of small peripancreatic varices/collateral venous mesenteric vessels/hematomas seen on CT.  Repeat CT with mildly enlarged hematoma, but overall improvement of hemoperitoneum.  - POD#3 from PD cath removal  - c/w daily CBC  - Monitor off abx as per ID   - c/w bowel regimen

## 2017-09-21 NOTE — PROGRESS NOTE ADULT - PROBLEM SELECTOR PLAN 3
Possibly 2/2 traction of PD catheter with underlying varices vs. rupture smaller collateral vessel during PD due to extensive collateralization of venous mesenteric system seen on CT A/P. Hgb stable.  - POD#3 from PD cath removal  - c/w daily CBC

## 2017-09-21 NOTE — PROGRESS NOTE ADULT - SUBJECTIVE AND OBJECTIVE BOX
[facility] DIVISION OF KIDNEY DISEASES AND HYPERTENSION -- FOLLOW UP NOTE  --------------------------------------------------------------------------------  Chief Complaint:  ESRD on PD, now off PD    24 hour events/subjective:  patient has no complaints at this time.  she reports feeling well and ready for discharge.        PAST HISTORY  --------------------------------------------------------------------------------  No significant changes to PMH, PSH, FHx, SHx, unless otherwise noted    ALLERGIES & MEDICATIONS  --------------------------------------------------------------------------------  Allergies    No Known Allergies    Intolerances      Standing Inpatient Medications  docusate sodium 100 milliGRAM(s) Oral two times a day  lactulose Syrup 5 Gram(s) Oral daily  simethicone 80 milliGRAM(s) Chew two times a day  sodium bicarbonate 1300 milliGRAM(s) Oral three times a day before meals  furosemide    Tablet 60 milliGRAM(s) Oral daily  sodium polystyrene sulfonate Suspension 15 Gram(s) Oral <User Schedule>  calcium carbonate 500 mG (Tums) Chewable 2 Tablet(s) Chew three times a day    PRN Inpatient Medications  HYDROmorphone  Injectable 0.5 milliGRAM(s) IV Push every 4 hours PRN      REVIEW OF SYSTEMS  --------------------------------------------------------------------------------  Gen: No fevers/chills  Skin: No rashes  Head/Eyes/Ears/Mouth: No headache  Respiratory: No dyspnea  CV: No chest pain  GI: No abdominal pain  : No dysuria  MSK: No edema  Neuro: No dizziness/lightheadedness    VITALS/PHYSICAL EXAM  --------------------------------------------------------------------------------  T(C): 36.8 (09-21-17 @ 08:53), Max: 37.1 (09-21-17 @ 04:44)  HR: 79 (09-21-17 @ 08:53) (76 - 88)  BP: 141/63 (09-21-17 @ 08:53) (99/63 - 141/63)  RR: 18 (09-21-17 @ 08:53) (16 - 18)  SpO2: 95% (09-21-17 @ 08:53) (95% - 98%)  Wt(kg): --        09-20-17 @ 07:01  -  09-21-17 @ 07:00  --------------------------------------------------------  IN: 0 mL / OUT: 600 mL / NET: -600 mL      Physical Exam:  	Gen: NAD, well-appearing  	HEENT: MMM  	Pulm: CTA B/L  	CV: RRR, S1S2; no rub  	Abd: +BS, soft, nontender/nondistended  	: No suprapubic tenderness  	UE:  no edema  	LE:  no edema  	Neuro: No focal deficits  	Psych: Normal affect and mood  	Skin: Warm  	Vascular access:  LUE AVF + thrill and bruit    LABS/STUDIES  --------------------------------------------------------------------------------              9.0    17.68 >-----------<  495      [09-21-17 @ 08:36]              30.5     139  |  99  |  50  ----------------------------<  94      [09-20-17 @ 09:06]  4.9   |  19  |  6.21        Ca     7.8     [09-20-17 @ 09:06]      Mg     2.1     [09-20-17 @ 09:06]      Phos  6.8     [09-20-17 @ 09:06]    Creatinine Trend:  SCr 6.21 [09-20 @ 09:06]  SCr 5.63 [09-19 @ 19:45]  SCr 5.72 [09-19 @ 12:51]  SCr 5.06 [09-19 @ 07:44]  SCr 5.05 [09-18 @ 20:00]

## 2017-09-21 NOTE — PROGRESS NOTE ADULT - PROBLEM SELECTOR PLAN 4
Normotensive currently, labetalol/HCTZ/amlodipine currently held as patient hypotensive previously, patient also receiving lasix for hyperkalemia leading to decrease BP  - Holding BP meds as patient was hypotensive previously, and currently normotensive Normotensive currently, labetalol/HCTZ/amlodipine currently held as patient hypotensive previously, patient also receiving lasix for hyperkalemia leading to decrease BP  - Holding BP meds as patient was hypotensive previously, and currently normotensive. To be d/c on no BP meds as has not required it inpt and BP well controlled.

## 2017-09-21 NOTE — PROGRESS NOTE ADULT - PROBLEM SELECTOR PROBLEM 2
Pelvic Pain: Care Instructions  Your Care Instructions    Pelvic pain, or pain in the lower belly, can have many causes. Often pelvic pain is not serious and gets better in a few days. If your pain continues or gets worse, you may need tests and treatment. Tell your doctor about any new symptoms. These may be signs of a serious problem. Follow-up care is a key part of your treatment and safety. Be sure to make and go to all appointments, and call your doctor if you are having problems. It's also a good idea to know your test results and keep a list of the medicines you take. How can you care for yourself at home? · Rest until you feel better. Lie down, and raise your legs by placing a pillow under your knees. · Drink plenty of fluids. You may find that small, frequent sips are easier on your stomach than if you drink a lot at once. Avoid drinks with carbonation or caffeine, such as soda pop, tea, or coffee. · Try eating several small meals instead of 2 or 3 large ones. Eat mild foods, such as rice, dry toast or crackers, bananas, and applesauce. Avoid fatty and spicy foods, other fruits, and alcohol until 48 hours after your symptoms have gone away. · Take an over-the-counter pain medicine, such as acetaminophen (Tylenol), ibuprofen (Advil, Motrin), or naproxen (Aleve). Read and follow all instructions on the label. · Do not take two or more pain medicines at the same time unless the doctor told you to. Many pain medicines have acetaminophen, which is Tylenol. Too much acetaminophen (Tylenol) can be harmful. · You can put a heating pad, a warm cloth, or moist heat on your belly to relieve pain. When should you call for help? Call 911 anytime you think you may need emergency care. For example, call if:  · You passed out (lost consciousness). Call your doctor now or seek immediate medical care if:  · Your pain is getting worse. · Your pain becomes focused in one area of your belly.   · You have severe vaginal bleeding. Severe means that you are soaking through your usual pads or tampons every hour for 2 or more hours and passing clots of blood. · You have new symptoms such as fever, urinary problems or unexpected vaginal bleeding. · You are dizzy or lightheaded, or you feel like you may faint. Watch closely for changes in your health, and be sure to contact your doctor if:  · You do not get better as expected. Where can you learn more? Go to http://klever-osamr.info/. Enter [id number] in the search box to learn more about \"Pelvic Pain: Care Instructions. \"  Current as of: October 13, 2016  Content Version: 11.2  © 7534-9946 [employer]. Care instructions adapted under license by [employer] (which disclaims liability or warranty for this information). If you have questions about a medical condition or this instruction, always ask your healthcare professional. [place] [address] any warranty or liability for your use of this information. Dizziness: Care Instructions  Your Care Instructions  Dizziness is the feeling of unsteadiness or fuzziness in your head. It is different than having vertigo, which is a feeling that the room is spinning or that you are moving or falling. It is also different from lightheadedness, which is the feeling that you are about to faint. It can be hard to know what causes dizziness. Some people feel dizzy when they have migraine headaches. Sometimes bouts of flu can make you feel dizzy. Some medical conditions, such as heart problems or high blood pressure, can make you feel dizzy. Many medicines can cause dizziness, including medicines for high blood pressure, pain, or anxiety. If a medicine causes your symptoms, your doctor may recommend that you stop or change the medicine. If it is a problem with your heart, you may need medicine to help your heart work better.  If there is no clear reason for your symptoms, your doctor may suggest watching and waiting for a while to see if the dizziness goes away on its own. Follow-up care is a key part of your treatment and safety. Be sure to make and go to all appointments, and call your doctor if you are having problems. It's also a good idea to know your test results and keep a list of the medicines you take. How can you care for yourself at home? · If your doctor recommends or prescribes medicine, take it exactly as directed. Call your doctor if you think you are having a problem with your medicine. · Do not drive while you feel dizzy. · Try to prevent falls. Steps you can take include:  ¨ Using nonskid mats, adding grab bars near the tub, and using night-lights. ¨ Clearing your home so that walkways are free of anything you might trip on. ¨ Letting family and friends know that you have been feeling dizzy. This will help them know how to help you. When should you call for help? Call 911 anytime you think you may need emergency care. For example, call if:  · You passed out (lost consciousness). · You have dizziness along with symptoms of a heart attack. These may include:  ¨ Chest pain or pressure, or a strange feeling in the chest.  ¨ Sweating. ¨ Shortness of breath. ¨ Nausea or vomiting. ¨ Pain, pressure, or a strange feeling in the back, neck, jaw, or upper belly or in one or both shoulders or arms. ¨ Lightheadedness or sudden weakness. ¨ A fast or irregular heartbeat. · You have symptoms of a stroke. These may include:  ¨ Sudden numbness, tingling, weakness, or loss of movement in your face, arm, or leg, especially on only one side of your body. ¨ Sudden vision changes. ¨ Sudden trouble speaking. ¨ Sudden confusion or trouble understanding simple statements. ¨ Sudden problems with walking or balance. ¨ A sudden, severe headache that is different from past headaches.   Call your doctor now or seek immediate medical care if:  · You feel dizzy and have a fever, headache, or ringing in your ears. · You have new or increased nausea and vomiting. · Your dizziness does not go away or comes back. Watch closely for changes in your health, and be sure to contact your doctor if:  · You do not get better as expected. Where can you learn more? Go to http://klever-osmar.info/. Enter V327 in the search box to learn more about \"Dizziness: Care Instructions. \"  Current as of: May 27, 2016  Content Version: 11.2  © 9326-4229 [employer]. Care instructions adapted under license by [employer] (which disclaims liability or warranty for this information). If you have questions about a medical condition or this instruction, always ask your healthcare professional. [address] any warranty or liability for your use of this information. Abdominal pain

## 2017-09-21 NOTE — PROGRESS NOTE ADULT - ASSESSMENT
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 Hospital course c/b hyperkalemia, currently being managed medically.

## 2017-09-21 NOTE — PROGRESS NOTE ADULT - PROBLEM SELECTOR PLAN 1
PD discontinued due to hemoperitoneum.  Managing electrolytes and volume medically with ESRD level renal function and treating now as CKD Stage V.  s/p AVF placement with excellent flow.  Creatinine rising, patient is sensitive to lasix.  Patient to follow-up weekly with Dr. De León for electrolytes + uremia evaluation.  As long as creatinine less than 7 today with no hyperkalemia then would discharge home.

## 2017-09-21 NOTE — PROGRESS NOTE ADULT - PROVIDER SPECIALTY LIST ADULT
Infectious Disease
Internal Medicine
MICU
Nephrology
Surgery
Vascular Surgery
Vascular Surgery
Internal Medicine
Internal Medicine
Nephrology
Internal Medicine
Nephrology
Nephrology

## 2017-09-21 NOTE — PROGRESS NOTE ADULT - SUBJECTIVE AND OBJECTIVE BOX
Trav Domingo, MS 4    Sara Zurita MD  PGY-3, Medicine Team 3  pager: [phone]    Patient is a 57y old  Female who presents with a chief complaint of Hemoperitoneum (20 Sep 2017 14:25)      SUBJECTIVE / OVERNIGHT EVENTS:  No acute events overnight. Pt reports soreness LUE at surgical site is improving. She denies fevers, chills, chest pain, SOB, abd pain, n/v/d, swelling or skin changes. She is urinating normally and having 1-2 BMs per day.      Vital Signs Last 24 Hrs  T(C): 36.8 (21 Sep 2017 08:53), Max: 37.1 (21 Sep 2017 04:44)  T(F): 98.2 (21 Sep 2017 08:53), Max: 98.7 (21 Sep 2017 04:44)  HR: 79 (21 Sep 2017 08:53) (76 - 88)  BP: 141/63 (21 Sep 2017 08:53) (99/63 - 141/63)  RR: 18 (21 Sep 2017 08:53) (16 - 18)  SpO2: 95% (21 Sep 2017 08:53) (95% - 98%)    PHYSICAL EXAM:  GENERAL: NAD, well-developed  HEAD:  Atraumatic, Normocephalic  EYES: EOMI, PERRLA, conjunctiva and sclera clear  NECK: Supple, No JVD  CHEST/LUNG: Clear to auscultation bilaterally; No wheeze  HEART: Regular rate and rhythm; No murmurs, rubs, or gallops  ABDOMEN: Soft, slight tenderness around incision site but C/D/I no signs of infection, Nondistended; Bowel sounds present.   EXTREMITIES: LUE tenderness around incision site, no erythema, no hematoma, no active bleeding, surgical site C/D/I, audible bruit and palpable thrill, small amount of serosanguinous fluid in NATALIIA drain, good radial pulses bilaterally, sensation intact to light touch distally      LABS:                        9.0    17.68 )-----------( 495      ( 21 Sep 2017 08:36 )             30.5     09-21    140  |  97  |  58<H>  ----------------------------<  80  4.5   |  21<L>  |  6.20<H>    Ca    8.4      21 Sep 2017 08:29  Phos  7.2     09-21  Mg     2.0     09-21    TPro  7.1  /  Alb  3.1<L>  /  TBili  1.3<H>  /  DBili  x   /  AST  19  /  ALT  4<L>  /  AlkPhos  170<H>  09-21    PT/INR - ( 20 Sep 2017 09:03 )   PT: 14.1 sec;   INR: 1.24 ratio         PTT - ( 20 Sep 2017 09:03 )  PTT:35.7 sec      I&O's Summary    20 Sep 2017 07:01  -  21 Sep 2017 07:00  --------------------------------------------------------  IN: 0 mL / OUT: 600 mL / NET: -600 mL      MEDICATIONS  (STANDING):  docusate sodium 100 milliGRAM(s) Oral two times a day  lactulose Syrup 5 Gram(s) Oral daily  simethicone 80 milliGRAM(s) Chew two times a day  sodium bicarbonate 1300 milliGRAM(s) Oral three times a day before meals  furosemide    Tablet 60 milliGRAM(s) Oral daily  sodium polystyrene sulfonate Suspension 15 Gram(s) Oral <User Schedule>  calcium carbonate 500 mG (Tums) Chewable 2 Tablet(s) Chew three times a day    MEDICATIONS  (PRN):  HYDROmorphone  Injectable 0.5 milliGRAM(s) IV Push every 4 hours PRN Severe Pain (7 - 10) Trav Domingo, MS 4    Sara Zurita MD  PGY-3, Medicine Team 3  pager: [phone]    Patient is a 57y old  Female who presents with a chief complaint of Hemoperitoneum (20 Sep 2017 14:25)      SUBJECTIVE / OVERNIGHT EVENTS:  No acute events overnight. Pt reports soreness LUE at surgical site is improving. She denies fevers, chills, chest pain, SOB, abd pain, n/v/d, swelling or skin changes. She is urinating normally and having 1-2 BMs per day. Excited to go home today.      Vital Signs Last 24 Hrs  T(C): 36.8 (21 Sep 2017 08:53), Max: 37.1 (21 Sep 2017 04:44)  T(F): 98.2 (21 Sep 2017 08:53), Max: 98.7 (21 Sep 2017 04:44)  HR: 79 (21 Sep 2017 08:53) (76 - 88)  BP: 141/63 (21 Sep 2017 08:53) (99/63 - 141/63)  RR: 18 (21 Sep 2017 08:53) (16 - 18)  SpO2: 95% (21 Sep 2017 08:53) (95% - 98%)    PHYSICAL EXAM:  GENERAL: NAD, well-developed  HEAD:  Atraumatic, Normocephalic  EYES: EOMI, PERRLA, conjunctiva and sclera clear  NECK: Supple, No JVD  CHEST/LUNG: Clear to auscultation bilaterally; No wheeze  HEART: Regular rate and rhythm; No murmurs, rubs, or gallops  ABDOMEN: Soft, slight tenderness around incision site but C/D/I no signs of infection, Nondistended; Bowel sounds present.   EXTREMITIES: LUE tenderness around incision site, no erythema, +healing ecchymoses, no active bleeding, surgical site C/D/I, audible bruit and palpable thrill, good radial pulses bilaterally, sensation intact to light touch distally      LABS:                        9.0    17.68 )-----------( 495      ( 21 Sep 2017 08:36 )             30.5     09-21    140  |  97  |  58<H>  ----------------------------<  80  4.5   |  21<L>  |  6.20<H>    Ca    8.4      21 Sep 2017 08:29  Phos  7.2     09-21  Mg     2.0     09-21    TPro  7.1  /  Alb  3.1<L>  /  TBili  1.3<H>  /  DBili  x   /  AST  19  /  ALT  4<L>  /  AlkPhos  170<H>  09-21    PT/INR - ( 20 Sep 2017 09:03 )   PT: 14.1 sec;   INR: 1.24 ratio         PTT - ( 20 Sep 2017 09:03 )  PTT:35.7 sec    LABS REVIEWED - hgb stable, wbc incr however likely reactive, K stable.        I&O's Summary    20 Sep 2017 07:01  -  21 Sep 2017 07:00  --------------------------------------------------------  IN: 0 mL / OUT: 600 mL / NET: -600 mL      MEDICATIONS  (STANDING):  docusate sodium 100 milliGRAM(s) Oral two times a day  lactulose Syrup 5 Gram(s) Oral daily  simethicone 80 milliGRAM(s) Chew two times a day  sodium bicarbonate 1300 milliGRAM(s) Oral three times a day before meals  furosemide    Tablet 60 milliGRAM(s) Oral daily  sodium polystyrene sulfonate Suspension 15 Gram(s) Oral <User Schedule>  calcium carbonate 500 mG (Tums) Chewable 2 Tablet(s) Chew three times a day    MEDICATIONS  (PRN):  HYDROmorphone  Injectable 0.5 milliGRAM(s) IV Push every 4 hours PRN Severe Pain (7 - 10)

## 2017-09-22 RX ORDER — CEPHALEXIN 250 MG/1
250 TABLET ORAL
Qty: 5 | Refills: 0 | Status: DISCONTINUED | COMMUNITY
Start: 2017-07-27 | End: 2017-09-22

## 2017-09-22 RX ORDER — ASPIRIN ENTERIC COATED TABLETS 81 MG 81 MG/1
81 TABLET, DELAYED RELEASE ORAL
Refills: 0 | Status: DISCONTINUED | COMMUNITY
Start: 2017-04-13 | End: 2017-09-22

## 2017-09-22 RX ORDER — AMLODIPINE BESYLATE 10 MG/1
10 TABLET ORAL DAILY
Qty: 90 | Refills: 2 | Status: DISCONTINUED | COMMUNITY
Start: 2017-02-02 | End: 2017-09-22

## 2017-09-22 RX ORDER — GENTAMICIN SULFATE 1 MG/G
0.1 CREAM TOPICAL
Qty: 1 | Refills: 11 | Status: DISCONTINUED | COMMUNITY
Start: 2017-08-14 | End: 2017-09-22

## 2017-09-27 ENCOUNTER — APPOINTMENT (OUTPATIENT)
Dept: NEPHROLOGY | Facility: CLINIC | Age: 57
End: 2017-09-27
Payer: MEDICAID

## 2017-09-27 ENCOUNTER — APPOINTMENT (OUTPATIENT)
Dept: SURGERY | Facility: CLINIC | Age: 57
End: 2017-09-27
Payer: MEDICAID

## 2017-09-27 ENCOUNTER — LABORATORY RESULT (OUTPATIENT)
Age: 57
End: 2017-09-27

## 2017-09-27 VITALS
SYSTOLIC BLOOD PRESSURE: 137 MMHG | WEIGHT: 120 LBS | DIASTOLIC BLOOD PRESSURE: 84 MMHG | TEMPERATURE: 98.1 F | HEART RATE: 80 BPM | BODY MASS INDEX: 23.56 KG/M2 | HEIGHT: 60 IN

## 2017-09-27 VITALS — DIASTOLIC BLOOD PRESSURE: 80 MMHG | HEART RATE: 72 BPM | SYSTOLIC BLOOD PRESSURE: 138 MMHG | RESPIRATION RATE: 12 BRPM

## 2017-09-27 PROCEDURE — 99215 OFFICE O/P EST HI 40 MIN: CPT

## 2017-09-27 PROCEDURE — 99212 OFFICE O/P EST SF 10 MIN: CPT

## 2017-09-28 LAB
BASOPHILS # BLD AUTO: 0 K/UL
BASOPHILS NFR BLD AUTO: 0 %
EOSINOPHIL # BLD AUTO: 0.26 K/UL
EOSINOPHIL NFR BLD AUTO: 1.8 %
HBA1C MFR BLD HPLC: 4.9 %
HCT VFR BLD CALC: 31.1 %
HGB BLD-MCNC: 8.7 G/DL
LYMPHOCYTES # BLD AUTO: 1.92 K/UL
LYMPHOCYTES NFR BLD AUTO: 13.3 %
MAN DIFF?: NORMAL
MCHC RBC-ENTMCNC: 21.4 PG
MCHC RBC-ENTMCNC: 28 GM/DL
MCV RBC AUTO: 76.6 FL
MONOCYTES # BLD AUTO: 0.26 K/UL
MONOCYTES NFR BLD AUTO: 1.8 %
NEUTROPHILS # BLD AUTO: 12.01 K/UL
NEUTROPHILS NFR BLD AUTO: 83.1 %
PLATELET # BLD AUTO: 409 K/UL
RBC # BLD: 4.06 M/UL
RBC # FLD: 20.2 %
WBC # FLD AUTO: 14.45 K/UL

## 2017-10-02 LAB
ALBUMIN SERPL ELPH-MCNC: 3.7 G/DL
ANION GAP SERPL CALC-SCNC: 21 MMOL/L
BUN SERPL-MCNC: 37 MG/DL
CALCIUM SERPL-MCNC: 8.1 MG/DL
CHLORIDE SERPL-SCNC: 100 MMOL/L
CO2 SERPL-SCNC: 20 MMOL/L
CREAT SERPL-MCNC: 5.17 MG/DL
FERRITIN SERPL-MCNC: 63 NG/ML
GLUCOSE SERPL-MCNC: 65 MG/DL
IRON SATN MFR SERPL: 9 %
IRON SERPL-MCNC: 25 UG/DL
PHOSPHATE SERPL-MCNC: 6.2 MG/DL
POTASSIUM SERPL-SCNC: 4 MMOL/L
SODIUM SERPL-SCNC: 141 MMOL/L
TIBC SERPL-MCNC: 277 UG/DL
UIBC SERPL-MCNC: 252 UG/DL

## 2017-10-05 ENCOUNTER — APPOINTMENT (OUTPATIENT)
Dept: VASCULAR SURGERY | Facility: CLINIC | Age: 57
End: 2017-10-05
Payer: MEDICAID

## 2017-10-05 PROCEDURE — 93990 DOPPLER FLOW TESTING: CPT

## 2017-10-05 PROCEDURE — 99024 POSTOP FOLLOW-UP VISIT: CPT

## 2017-10-06 ENCOUNTER — APPOINTMENT (OUTPATIENT)
Dept: INTERNAL MEDICINE | Facility: CLINIC | Age: 57
End: 2017-10-06

## 2017-10-06 ENCOUNTER — MED ADMIN CHARGE (OUTPATIENT)
Age: 57
End: 2017-10-06

## 2017-10-09 ENCOUNTER — APPOINTMENT (OUTPATIENT)
Age: 57
End: 2017-10-09
Payer: MEDICAID

## 2017-10-09 LAB
ALBUMIN SERPL ELPH-MCNC: 3.7 G/DL
ANION GAP SERPL CALC-SCNC: 19 MMOL/L
BASOPHILS # BLD AUTO: 0.06 K/UL
BASOPHILS NFR BLD AUTO: 0.4 %
BUN SERPL-MCNC: 48 MG/DL
CALCIUM SERPL-MCNC: 8 MG/DL
CHLORIDE SERPL-SCNC: 98 MMOL/L
CO2 SERPL-SCNC: 21 MMOL/L
CREAT SERPL-MCNC: 5.19 MG/DL
EOSINOPHIL # BLD AUTO: 0.32 K/UL
EOSINOPHIL NFR BLD AUTO: 2.3 %
GLUCOSE SERPL-MCNC: 90 MG/DL
HCT VFR BLD CALC: 32.3 %
HGB BLD-MCNC: 9.3 G/DL
IMM GRANULOCYTES NFR BLD AUTO: 0.2 %
LYMPHOCYTES # BLD AUTO: 1.19 K/UL
LYMPHOCYTES NFR BLD AUTO: 8.5 %
MAN DIFF?: NORMAL
MCHC RBC-ENTMCNC: 21.9 PG
MCHC RBC-ENTMCNC: 28.8 GM/DL
MCV RBC AUTO: 76.2 FL
MONOCYTES # BLD AUTO: 0.44 K/UL
MONOCYTES NFR BLD AUTO: 3.2 %
NEUTROPHILS # BLD AUTO: 11.9 K/UL
NEUTROPHILS NFR BLD AUTO: 85.4 %
PHOSPHATE SERPL-MCNC: 5.2 MG/DL
PLATELET # BLD AUTO: 378 K/UL
POTASSIUM SERPL-SCNC: 4.8 MMOL/L
RBC # BLD: 4.24 M/UL
RBC # FLD: 19.2 %
SODIUM SERPL-SCNC: 138 MMOL/L
WBC # FLD AUTO: 13.94 K/UL

## 2017-10-09 PROCEDURE — 36902Z: CUSTOM

## 2017-10-11 ENCOUNTER — INPATIENT (INPATIENT)
Facility: HOSPITAL | Age: 57
LOS: 1 days | Discharge: HOME | DRG: 391 | End: 2017-10-13
Attending: HOSPITALIST | Admitting: INTERNAL MEDICINE
Payer: MEDICAID

## 2017-10-11 VITALS
OXYGEN SATURATION: 100 % | DIASTOLIC BLOOD PRESSURE: 76 MMHG | TEMPERATURE: 98 F | HEART RATE: 86 BPM | RESPIRATION RATE: 18 BRPM | SYSTOLIC BLOOD PRESSURE: 116 MMHG

## 2017-10-11 DIAGNOSIS — R10.84 GENERALIZED ABDOMINAL PAIN: ICD-10-CM

## 2017-10-11 DIAGNOSIS — I10 ESSENTIAL (PRIMARY) HYPERTENSION: ICD-10-CM

## 2017-10-11 DIAGNOSIS — E87.2 ACIDOSIS: ICD-10-CM

## 2017-10-11 DIAGNOSIS — N18.6 END STAGE RENAL DISEASE: ICD-10-CM

## 2017-10-11 DIAGNOSIS — E87.5 HYPERKALEMIA: ICD-10-CM

## 2017-10-11 DIAGNOSIS — T85.898A OTHER SPECIFIED COMPLICATION OF OTHER INTERNAL PROSTHETIC DEVICES, IMPLANTS AND GRAFTS, INITIAL ENCOUNTER: Chronic | ICD-10-CM

## 2017-10-11 DIAGNOSIS — I77.0 ARTERIOVENOUS FISTULA, ACQUIRED: Chronic | ICD-10-CM

## 2017-10-11 DIAGNOSIS — Z99.2 DEPENDENCE ON RENAL DIALYSIS: Chronic | ICD-10-CM

## 2017-10-11 DIAGNOSIS — Z29.9 ENCOUNTER FOR PROPHYLACTIC MEASURES, UNSPECIFIED: ICD-10-CM

## 2017-10-11 LAB
ALBUMIN SERPL ELPH-MCNC: 4.9 G/DL — SIGNIFICANT CHANGE UP (ref 3.3–5)
ALP SERPL-CCNC: 175 U/L — HIGH (ref 40–120)
ALT FLD-CCNC: 10 U/L RC — SIGNIFICANT CHANGE UP (ref 10–45)
ANION GAP SERPL CALC-SCNC: 18 MMOL/L — HIGH (ref 5–17)
ANION GAP SERPL CALC-SCNC: 19 MMOL/L — HIGH (ref 5–17)
ANISOCYTOSIS BLD QL: SIGNIFICANT CHANGE UP
APPEARANCE UR: CLEAR — SIGNIFICANT CHANGE UP
APTT BLD: 33.8 SEC — SIGNIFICANT CHANGE UP (ref 27.5–37.4)
AST SERPL-CCNC: 49 U/L — HIGH (ref 10–40)
BASE EXCESS BLDV CALC-SCNC: -3.9 MMOL/L — LOW (ref -2–2)
BASOPHILS # BLD AUTO: 0.1 K/UL — SIGNIFICANT CHANGE UP (ref 0–0.2)
BASOPHILS NFR BLD AUTO: 0.4 % — SIGNIFICANT CHANGE UP (ref 0–2)
BILIRUB SERPL-MCNC: 1.4 MG/DL — HIGH (ref 0.2–1.2)
BILIRUB UR-MCNC: NEGATIVE — SIGNIFICANT CHANGE UP
BUN SERPL-MCNC: 45 MG/DL — HIGH (ref 7–23)
BUN SERPL-MCNC: 45 MG/DL — HIGH (ref 7–23)
CA-I SERPL-SCNC: 1.14 MMOL/L — SIGNIFICANT CHANGE UP (ref 1.12–1.3)
CALCIUM SERPL-MCNC: 8.2 MG/DL — LOW (ref 8.4–10.5)
CALCIUM SERPL-MCNC: 9 MG/DL — SIGNIFICANT CHANGE UP (ref 8.4–10.5)
CHLORIDE BLDV-SCNC: 105 MMOL/L — SIGNIFICANT CHANGE UP (ref 96–108)
CHLORIDE SERPL-SCNC: 101 MMOL/L — SIGNIFICANT CHANGE UP (ref 96–108)
CHLORIDE SERPL-SCNC: 98 MMOL/L — SIGNIFICANT CHANGE UP (ref 96–108)
CO2 BLDV-SCNC: 23 MMOL/L — SIGNIFICANT CHANGE UP (ref 22–30)
CO2 SERPL-SCNC: 18 MMOL/L — LOW (ref 22–31)
CO2 SERPL-SCNC: 19 MMOL/L — LOW (ref 22–31)
COLOR SPEC: COLORLESS — SIGNIFICANT CHANGE UP
CREAT SERPL-MCNC: 5.21 MG/DL — HIGH (ref 0.5–1.3)
CREAT SERPL-MCNC: 5.4 MG/DL — HIGH (ref 0.5–1.3)
DACRYOCYTES BLD QL SMEAR: SLIGHT — SIGNIFICANT CHANGE UP
DIFF PNL FLD: NEGATIVE — SIGNIFICANT CHANGE UP
ELLIPTOCYTES BLD QL SMEAR: SLIGHT — SIGNIFICANT CHANGE UP
EOSINOPHIL # BLD AUTO: 0.3 K/UL — SIGNIFICANT CHANGE UP (ref 0–0.5)
EOSINOPHIL NFR BLD AUTO: 2 % — SIGNIFICANT CHANGE UP (ref 0–6)
GAS PNL BLDV: 135 MMOL/L — LOW (ref 136–145)
GAS PNL BLDV: SIGNIFICANT CHANGE UP
GAS PNL BLDV: SIGNIFICANT CHANGE UP
GIANT PLATELETS BLD QL SMEAR: PRESENT — SIGNIFICANT CHANGE UP
GLUCOSE BLDV-MCNC: 110 MG/DL — HIGH (ref 70–99)
GLUCOSE SERPL-MCNC: 103 MG/DL — HIGH (ref 70–99)
GLUCOSE SERPL-MCNC: 109 MG/DL — HIGH (ref 70–99)
GLUCOSE UR QL: NEGATIVE — SIGNIFICANT CHANGE UP
HCO3 BLDV-SCNC: 22 MMOL/L — SIGNIFICANT CHANGE UP (ref 21–29)
HCT VFR BLD CALC: 38.9 % — SIGNIFICANT CHANGE UP (ref 34.5–45)
HCT VFR BLDA CALC: 36 % — LOW (ref 39–50)
HGB BLD CALC-MCNC: 11.7 G/DL — SIGNIFICANT CHANGE UP (ref 11.5–15.5)
HGB BLD-MCNC: 12.3 G/DL — SIGNIFICANT CHANGE UP (ref 11.5–15.5)
HYPOCHROMIA BLD QL: SLIGHT — SIGNIFICANT CHANGE UP
INR BLD: 1.18 RATIO — HIGH (ref 0.88–1.16)
KETONES UR-MCNC: NEGATIVE — SIGNIFICANT CHANGE UP
LACTATE BLDV-MCNC: 1.6 MMOL/L — SIGNIFICANT CHANGE UP (ref 0.7–2)
LEUKOCYTE ESTERASE UR-ACNC: ABNORMAL
LIDOCAIN IGE QN: 68 U/L — HIGH (ref 7–60)
LYMPHOCYTES # BLD AUTO: 1.2 K/UL — SIGNIFICANT CHANGE UP (ref 1–3.3)
LYMPHOCYTES # BLD AUTO: 7.8 % — LOW (ref 13–44)
MACROCYTES BLD QL: SLIGHT — SIGNIFICANT CHANGE UP
MCHC RBC-ENTMCNC: 23.3 PG — LOW (ref 27–34)
MCHC RBC-ENTMCNC: 31.5 GM/DL — LOW (ref 32–36)
MCV RBC AUTO: 73.9 FL — LOW (ref 80–100)
MICROCYTES BLD QL: SIGNIFICANT CHANGE UP
MONOCYTES # BLD AUTO: 0.4 K/UL — SIGNIFICANT CHANGE UP (ref 0–0.9)
MONOCYTES NFR BLD AUTO: 2.6 % — SIGNIFICANT CHANGE UP (ref 2–14)
NEUTROPHILS # BLD AUTO: 13.9 K/UL — HIGH (ref 1.8–7.4)
NEUTROPHILS NFR BLD AUTO: 87.2 % — HIGH (ref 43–77)
NITRITE UR-MCNC: NEGATIVE — SIGNIFICANT CHANGE UP
OVALOCYTES BLD QL SMEAR: SLIGHT — SIGNIFICANT CHANGE UP
PCO2 BLDV: 45 MMHG — SIGNIFICANT CHANGE UP (ref 35–50)
PH BLDV: 7.31 — LOW (ref 7.35–7.45)
PH UR: 7 — SIGNIFICANT CHANGE UP (ref 5–8)
PLAT MORPH BLD: ABNORMAL
PLATELET # BLD AUTO: 381 K/UL — SIGNIFICANT CHANGE UP (ref 150–400)
PO2 BLDV: 28 MMHG — SIGNIFICANT CHANGE UP (ref 25–45)
POLYCHROMASIA BLD QL SMEAR: SLIGHT — SIGNIFICANT CHANGE UP
POTASSIUM BLDV-SCNC: 5.1 MMOL/L — HIGH (ref 3.5–5)
POTASSIUM SERPL-MCNC: 5.3 MMOL/L — SIGNIFICANT CHANGE UP (ref 3.5–5.3)
POTASSIUM SERPL-MCNC: 6.2 MMOL/L — CRITICAL HIGH (ref 3.5–5.3)
POTASSIUM SERPL-SCNC: 5.3 MMOL/L — SIGNIFICANT CHANGE UP (ref 3.5–5.3)
POTASSIUM SERPL-SCNC: 6.2 MMOL/L — CRITICAL HIGH (ref 3.5–5.3)
PROT SERPL-MCNC: 9.9 G/DL — HIGH (ref 6–8.3)
PROT UR-MCNC: 100 MG/DL
PROTHROM AB SERPL-ACNC: 12.8 SEC — HIGH (ref 9.8–12.7)
RBC # BLD: 5.26 M/UL — HIGH (ref 3.8–5.2)
RBC # FLD: 19 % — HIGH (ref 10.3–14.5)
RBC BLD AUTO: ABNORMAL
SAO2 % BLDV: 39 % — LOW (ref 67–88)
SODIUM SERPL-SCNC: 135 MMOL/L — SIGNIFICANT CHANGE UP (ref 135–145)
SODIUM SERPL-SCNC: 138 MMOL/L — SIGNIFICANT CHANGE UP (ref 135–145)
SP GR SPEC: 1.01 — LOW (ref 1.01–1.02)
UROBILINOGEN FLD QL: NEGATIVE — SIGNIFICANT CHANGE UP
WBC # BLD: 16 K/UL — HIGH (ref 3.8–10.5)
WBC # FLD AUTO: 16 K/UL — HIGH (ref 3.8–10.5)
WBC UR QL: ABNORMAL /HPF (ref 0–5)

## 2017-10-11 PROCEDURE — 93010 ELECTROCARDIOGRAM REPORT: CPT

## 2017-10-11 PROCEDURE — 99223 1ST HOSP IP/OBS HIGH 75: CPT | Mod: GC

## 2017-10-11 PROCEDURE — 99254 IP/OBS CNSLTJ NEW/EST MOD 60: CPT | Mod: GC

## 2017-10-11 PROCEDURE — 74176 CT ABD & PELVIS W/O CONTRAST: CPT | Mod: 26

## 2017-10-11 PROCEDURE — 99285 EMERGENCY DEPT VISIT HI MDM: CPT | Mod: 25

## 2017-10-11 RX ORDER — ACETAMINOPHEN 500 MG
650 TABLET ORAL EVERY 6 HOURS
Qty: 0 | Refills: 0 | Status: DISCONTINUED | OUTPATIENT
Start: 2017-10-11 | End: 2017-10-13

## 2017-10-11 RX ORDER — SODIUM POLYSTYRENE SULFONATE 4.1 MEQ/G
30 POWDER, FOR SUSPENSION ORAL
Qty: 0 | Refills: 0 | Status: DISCONTINUED | OUTPATIENT
Start: 2017-10-11 | End: 2017-10-12

## 2017-10-11 RX ORDER — ONDANSETRON 8 MG/1
4 TABLET, FILM COATED ORAL EVERY 6 HOURS
Qty: 0 | Refills: 0 | Status: DISCONTINUED | OUTPATIENT
Start: 2017-10-11 | End: 2017-10-13

## 2017-10-11 RX ORDER — SODIUM CHLORIDE 9 MG/ML
250 INJECTION INTRAMUSCULAR; INTRAVENOUS; SUBCUTANEOUS ONCE
Qty: 0 | Refills: 0 | Status: COMPLETED | OUTPATIENT
Start: 2017-10-11 | End: 2017-10-11

## 2017-10-11 RX ORDER — AMLODIPINE BESYLATE 2.5 MG/1
10 TABLET ORAL DAILY
Qty: 0 | Refills: 0 | Status: DISCONTINUED | OUTPATIENT
Start: 2017-10-11 | End: 2017-10-13

## 2017-10-11 RX ORDER — ONDANSETRON 8 MG/1
4 TABLET, FILM COATED ORAL ONCE
Qty: 0 | Refills: 0 | Status: COMPLETED | OUTPATIENT
Start: 2017-10-11 | End: 2017-10-11

## 2017-10-11 RX ORDER — SODIUM BICARBONATE 1 MEQ/ML
1300 SYRINGE (ML) INTRAVENOUS THREE TIMES A DAY
Qty: 0 | Refills: 0 | Status: DISCONTINUED | OUTPATIENT
Start: 2017-10-11 | End: 2017-10-13

## 2017-10-11 RX ORDER — HYDROMORPHONE HYDROCHLORIDE 2 MG/ML
0.5 INJECTION INTRAMUSCULAR; INTRAVENOUS; SUBCUTANEOUS ONCE
Qty: 0 | Refills: 0 | Status: DISCONTINUED | OUTPATIENT
Start: 2017-10-11 | End: 2017-10-11

## 2017-10-11 RX ORDER — SODIUM POLYSTYRENE SULFONATE 4.1 MEQ/G
30 POWDER, FOR SUSPENSION ORAL
Qty: 0 | Refills: 0 | Status: COMPLETED | OUTPATIENT
Start: 2017-10-11 | End: 2017-10-11

## 2017-10-11 RX ORDER — ACETAMINOPHEN 500 MG
650 TABLET ORAL EVERY 6 HOURS
Qty: 0 | Refills: 0 | Status: DISCONTINUED | OUTPATIENT
Start: 2017-10-11 | End: 2017-10-11

## 2017-10-11 RX ORDER — HYDROMORPHONE HYDROCHLORIDE 2 MG/ML
0.5 INJECTION INTRAMUSCULAR; INTRAVENOUS; SUBCUTANEOUS EVERY 6 HOURS
Qty: 0 | Refills: 0 | Status: DISCONTINUED | OUTPATIENT
Start: 2017-10-11 | End: 2017-10-13

## 2017-10-11 RX ORDER — SODIUM CHLORIDE 9 MG/ML
3 INJECTION INTRAMUSCULAR; INTRAVENOUS; SUBCUTANEOUS ONCE
Qty: 0 | Refills: 0 | Status: COMPLETED | OUTPATIENT
Start: 2017-10-11 | End: 2017-10-11

## 2017-10-11 RX ADMIN — SODIUM POLYSTYRENE SULFONATE 30 GRAM(S): 4.1 POWDER, FOR SUSPENSION ORAL at 21:12

## 2017-10-11 RX ADMIN — SODIUM CHLORIDE 3 MILLILITER(S): 9 INJECTION INTRAMUSCULAR; INTRAVENOUS; SUBCUTANEOUS at 14:36

## 2017-10-11 RX ADMIN — ONDANSETRON 4 MILLIGRAM(S): 8 TABLET, FILM COATED ORAL at 22:38

## 2017-10-11 RX ADMIN — ONDANSETRON 4 MILLIGRAM(S): 8 TABLET, FILM COATED ORAL at 14:55

## 2017-10-11 RX ADMIN — HYDROMORPHONE HYDROCHLORIDE 0.5 MILLIGRAM(S): 2 INJECTION INTRAMUSCULAR; INTRAVENOUS; SUBCUTANEOUS at 21:12

## 2017-10-11 RX ADMIN — SODIUM CHLORIDE 1000 MILLILITER(S): 9 INJECTION INTRAMUSCULAR; INTRAVENOUS; SUBCUTANEOUS at 14:55

## 2017-10-11 RX ADMIN — Medication 1300 MILLIGRAM(S): at 21:12

## 2017-10-11 RX ADMIN — HYDROMORPHONE HYDROCHLORIDE 0.5 MILLIGRAM(S): 2 INJECTION INTRAMUSCULAR; INTRAVENOUS; SUBCUTANEOUS at 21:42

## 2017-10-11 RX ADMIN — HYDROMORPHONE HYDROCHLORIDE 0.5 MILLIGRAM(S): 2 INJECTION INTRAMUSCULAR; INTRAVENOUS; SUBCUTANEOUS at 18:26

## 2017-10-11 RX ADMIN — HYDROMORPHONE HYDROCHLORIDE 0.5 MILLIGRAM(S): 2 INJECTION INTRAMUSCULAR; INTRAVENOUS; SUBCUTANEOUS at 14:56

## 2017-10-11 NOTE — H&P ADULT - PROBLEM SELECTOR PLAN 2
No indications for urgent HD.   Renal recommendations appreciated. s/p LUE AVF awaiting maturation. Previously on PD however stopped d/t hemoperitoneum.  No indications for urgent HD.   Renal recommendations appreciated. If ongoing nausea/vomiting and decreased appetite will consider inpatient HD via [facility].   Avoid nephrotoxins. Strict I/Os.

## 2017-10-11 NOTE — ED ADULT NURSE NOTE - FAMILY HISTORY
Family history of hypertension in mother     Grandparent  Still living? No  Family history of malignant neoplasm, Age at diagnosis: Age Unknown

## 2017-10-11 NOTE — H&P ADULT - PSH
AV fistula  Placed 9/18  Hemoperitoneum as complication of peritoneal dialysis  s/p removal 9/18  Peritoneal dialysis catheter in place  Placed 8/9/2017

## 2017-10-11 NOTE — H&P ADULT - NSHPLABSRESULTS_GEN_ALL_CORE
12.3   16.0  )-----------( 381      ( 11 Oct 2017 14:19 )             38.9     Auto Eosinophil # 0.3   / Auto Eosinophil % 2.0   / Auto Neutrophil # 13.9  / Auto Neutrophil % 87.2  / BANDS % x        10-11    135  |  98  |  45<H>  ----------------------------<  109<H>  6.2<HH>   |  18<L>  |  5.40<H>    Ca    9.0      11 Oct 2017 14:19    TPro  9.9<H>  /  Alb  4.9  /  TBili  1.4<H>  /  DBili  x   /  AST  49<H>  /  ALT  10  /  AlkPhos  175<H>  10-11    10-11-17 @ 14:19 - VBG - pH: 7.31  | pCO2: 45    | pO2: 28    | Lactate: 1.6      Urinalysis Basic - ( 11 Oct 2017 15:16 )    Color: Colorless / Appearance: Clear / S.008 / pH: x  Gluc: x / Ketone: Negative  / Bili: Negative / Urobili: Negative   Blood: x / Protein: 100 mg/dL / Nitrite: Negative   Leuk Esterase: Trace / RBC: x / WBC 10-25 /HPF   Sq Epi: x / Non Sq Epi: x / Bacteria: x    CT AP personally reviewed by me:   CT Abdomen and Pelvis No Cont (10.11.17 @ 14:54)   IMPRESSION:   1.  Left upper quadrant hematoma, decreased in size and density since 2017.  2.  Bilateral lower lobe groundglass opacities, may be due to pulmonary edema. Labs personally reviewed by me.    12.3   16.0  )-----------( 381      ( 11 Oct 2017 14:19 )             38.9     Auto Eosinophil # 0.3   / Auto Eosinophil % 2.0   / Auto Neutrophil # 13.9  / Auto Neutrophil % 87.2  / BANDS % x        10-11    135  |  98  |  45<H>  ----------------------------<  109<H>  6.2<HH>   |  18<L>  |  5.40<H>    Ca    9.0      11 Oct 2017 14:19    TPro  9.9<H>  /  Alb  4.9  /  TBili  1.4<H>  /  DBili  x   /  AST  49<H>  /  ALT  10  /  AlkPhos  175<H>  10-11    10-11-17 @ 14:19 - VBG - pH: 7.31  | pCO2: 45    | pO2: 28    | Lactate: 1.6      Urinalysis Basic - ( 11 Oct 2017 15:16 )    Color: Colorless / Appearance: Clear / S.008 / pH: x  Gluc: x / Ketone: Negative  / Bili: Negative / Urobili: Negative   Blood: x / Protein: 100 mg/dL / Nitrite: Negative   Leuk Esterase: Trace / RBC: x / WBC 10-25 /HPF   Sq Epi: x / Non Sq Epi: x / Bacteria: x    CT AP personally reviewed by me:   CT Abdomen and Pelvis No Cont (10.11.17 @ 14:54)   IMPRESSION:   1.  Left upper quadrant hematoma, decreased in size and density since 2017.  2.  Bilateral lower lobe groundglass opacities, may be due to pulmonary edema.    EKG personally reviewed by me:

## 2017-10-11 NOTE — ED PROVIDER NOTE - NS_ ATTENDINGSCRIBEDETAILS _ED_A_ED_FT
presented 57 y.o. F with n-stage renal failure, last dialysis few weeks ago, waiting for L arm fistula to mature for permanent hemodialysis, currently making urine, presented with abdominal pain progressively getting worse. Dilaudid home dose PTA, associated with nausea and vomiting. No fever, diarrhea, blood in emesis. Plan: nephrologist consult on arrival, CT abd, labs, pain control, gentle IV hydration, and reassess. L arm AV fistula + thrill,

## 2017-10-11 NOTE — H&P ADULT - ATTENDING COMMENTS
Pt seen and examined at bedside.  I have precepted this case with house staff and agree with resident note above with changes made where appropriate, and the following additions:     In addition to above, my interval repeat exam of patient revealed a much less tender abdomen; pt states dilaudid greatly helped.  Still with mild LLQ/RLQ TTP, no rebound/noguarding, no acute abdomen. +flatus/BM, non bloody.  Suspect the above noted differential; we are limited to further eval belly with contrast study given the fact that patient is ESRD and not currently on HD after PD cath removal and with a non-mature fistula in place; will have to arrange with renal RE: feasibility of contrast study.  f/u abd sono to non-invasively eval for thrombosis as pt at higher risk given her PCV, hx of AVF clot and prior splenic infarcts with secondary varices; also with mildly elevated Tb/transaminases need to r/o portal vein thrombus. UA noted to have more inflammation than prior but not overtly positive; pt without suprapubic TTP, mostly localized to belly; no dysuria on hx, afebrile, monitor closely off abx for now.  CTa/p noted normal pancrease/ovaries/uterus/liver.

## 2017-10-11 NOTE — CONSULT NOTE ADULT - PROBLEM SELECTOR RECOMMENDATION 9
2/2 ESRD. Baseline in 5s per family; K+ 4.0 on 9/28. Missed kayexalate dose today.   - repeat BMP  - give Kayexalate 2/2 ESRD. Baseline in 5s per family; K+ 4.0 on 9/28. Missed kayexalate dose today.   - BMP severely hemolyzed. K 5.1 on VBG  - Kayexalate

## 2017-10-11 NOTE — H&P ADULT - NSHPREVIEWOFSYSTEMS_GEN_ALL_CORE
CONSTITUTIONAL:  No weight loss, fever, chills, weakness or fatigue.  HEENT:  Eyes:  No visual loss, blurred vision, double vision or yellow sclerae. Ears, Nose, Throat:  No hearing loss, sneezing, congestion, runny nose or sore throat.  SKIN:  No rash or itching.  CARDIOVASCULAR:  No chest pain, chest pressure or chest discomfort. No palpitations or edema.  RESPIRATORY:  No shortness of breath, cough or sputum.  GASTROINTESTINAL:  No anorexia, nausea, vomiting or diarrhea. No abdominal pain or blood.  GENITOURINARY:  Denies hematuria, dysuria.   NEUROLOGICAL:  No headache, dizziness, syncope, paralysis, ataxia, numbness or tingling in the extremities. No change in bowel or bladder control.  MUSCULOSKELETAL:  No muscle, back pain, joint pain or stiffness.  HEMATOLOGIC:  No anemia, bleeding or bruising.  LYMPHATICS:  No enlarged nodes. No history of splenectomy.  PSYCHIATRIC:  No history of depression or anxiety.  ENDOCRINOLOGIC:  No reports of sweating, cold or heat intolerance. No polyuria or polydipsia.  ALLERGIES:  No history of asthma, hives, eczema or rhinitis. CONSTITUTIONAL:  See above  HEENT:  Eyes:  No visual loss, blurred vision, double vision or yellow sclerae. Ears, Nose, Throat:  No hearing loss, sneezing, congestion, runny nose or sore throat.  SKIN:  No rash or itching.  CARDIOVASCULAR:  No chest pain, chest pressure or chest discomfort. No palpitations or edema.  RESPIRATORY:  No shortness of breath, cough or sputum.  GASTROINTESTINAL:  See above   GENITOURINARY:  Denies hematuria, dysuria.   NEUROLOGICAL:  +headache.  No dizziness, syncope, paralysis, ataxia, numbness or tingling in the extremities. No change in bowel or bladder control.  MUSCULOSKELETAL:  No muscle, back pain, joint pain or stiffness.  HEMATOLOGIC:  No anemia, bleeding or bruising.  PSYCHIATRIC:  No history of depression or anxiety.  ENDOCRINOLOGIC:  No reports of sweating, cold or heat intolerance. No polyuria or polydipsia.  ALLERGIES:  No history of asthma, hives, eczema or rhinitis.

## 2017-10-11 NOTE — ED PROVIDER NOTE - MEDICAL DECISION MAKING DETAILS
presented 57 y.o. F with n-stage renal failure, last dialysis few weeks ago, waiting for L arm fistula to mature for permanent hemodialysis, currently making urine, presented with abdominal pain progressively getting worse. Dilaudid home dose PTA, associated with nausea and vomiting. No fever, diarrhea, blood in emesis. Plan: nephrologist consult on arrival, CT abd, labs, pain control, gentle IV hydration, and reassess. presented 57 y.o. F with n-stage renal failure, last dialysis few weeks ago, waiting for L arm fistula to mature for permanent hemodialysis, currently making urine, presented with abdominal pain progressively getting worse. Dilaudid home dose PTA, associated with nausea and vomiting. No fever, diarrhea, blood in emesis. Plan: nephrologist consult on arrival, CT abd, labs, pain control, gentle IV hydration, and reassess. L arm AV fistula + thrill,

## 2017-10-11 NOTE — ED ADULT NURSE NOTE - LANGUAGE ASSISTANCE NEEDED
Melissa Daughter will translate/No-Patient/Caregiver offered and refused free interpretation services.

## 2017-10-11 NOTE — ED PROVIDER NOTE - GASTROINTESTINAL, MLM
Abdomen soft, non-distended. Positive guarding, positive diffuse tenderness to all 4 quadrants. No rebound. No CVA tenderness. No edema.

## 2017-10-11 NOTE — H&P ADULT - PROBLEM SELECTOR PLAN 1
Unclear etiology. CT AP non-diagnostic.   Hemodynamically stable. Afebrile. Leukocytosis. Lactate wnl.   Viral etiology vs less likely SBP vs ischemic etiology vs    Zofran 4mg q6h PRN. Patient with severe abdominal pain x 1 day associated with decreased PO intake and appetite and previous episodes of vomiting. Unclear etiology. CT AP non-diagnostic. Hemodynamically stable. Afebrile. Leukocytosis. Lactate wnl.   s/p removal of [place] catheter 9/18. Less suspicion for SBP given no ascites on imaging, afebrile. No evidence of pancreatitis on imaging.? ischemic process given lactate wnl. Lipase wnl. Possibly viral etiology.   Tylenol 650mg q6h PRN for pain  Zofran 4mg q6h PRN. Patient with severe abdominal pain x 1 day associated with decreased PO intake and appetite and previous episodes of vomiting. Unclear etiology. CT AP non-diagnostic. Hemodynamically stable. Afebrile. Leukocytosis. Lactate wnl.   s/p removal of AZ catheter 9/18. Less suspicion for SBP given no ascites on imaging, afebrile. No evidence of pancreatitis on imaging.? ischemic process given lactate wnl. Lipase wnl. Possibly viral etiology.   Tylenol 650mg q6h PRN for pain Dialudid 0.5mg IV q6h PRN severe pain.   Zofran 4mg q6h PRN. Patient with severe abdominal pain x 1 day associated with decreased PO intake and appetite and previous episodes of vomiting. Unclear etiology. CT AP non-diagnostic. Hemodynamically stable. Afebrile. chronic leukocytosis. Lactate wnl.   s/p removal of [place] catheter 9/18. Less suspicion for SBP given no ascites on imaging, afebrile. No evidence of pancreatitis on imaging. Possible ischemia etiology, mesenteric ischemia given h/o of PV however lactate wnl. Lipase wnl. Possibly ?viral etiology. UA mildly positive however denies  sx and doesn't fully explain generalized  abdominal tenderness.  Will discuss with Renal re: possible need to CTA w/IV con to assess for thrombosis. If planning on CTA will need [facility] access for HD.  GI consult re: unclear abdominal pain etiology  Will get Abdominal US w/ Doppler to r/o splenic and portal vein thrombosis.   Given patient hemodynamically stable with sx of dysuria, hematuria will hold off on abx at this time.   Tylenol 650mg q6h PRN for pain Dilaudid 0.5mg IV q6h PRN severe pain.   Zofran 4mg q6h PRN. Patient with severe abdominal pain x 1 day associated with decreased PO intake and appetite and previous episodes of vomiting. Unclear etiology. Differential includes but not limited to peritonitis vs. portal/splenic vein thrombus vs mesenteric ischemia vs. colitis/gastroenteritis vs cystitis  --CT AP non-diagnostic. Hemodynamically stable. Afebrile. chronic leukocytosis in setting of PCV. Lactate wnl.   -Less suspicion for hematoma causing sx, as interval decrease in size, hgb stable.   -s/p removal of [place] catheter 9/18. Less suspicion for SBP given no ascites on imaging, afebrile. No evidence of pancreatitis on imaging. Possible ischemia etiology, mesenteric ischemia given h/o of PV however lactate wnl. Lipase not sig elevated. Possibly ?viral etiology. UA mildly positive however denies  sx and doesn't fully explain generalized  abdominal tenderness, also afebrile.   -Pt says pain greatly reduced with dilaudid dose  -Will discuss with Renal re: possible need to CTA w/IV con to assess for thrombosis/mesenteric ischemia/abscess.  If planning on CTA will need [facility] access for HD, thus will defer until d/w renal  -GI consult re: unclear abdominal pain etiology +n/v  -Will get Abdominal US w/ Doppler to r/o splenic portal and vein thrombosis.   -Given patient hemodynamically stable with sx of dysuria, hematuria will hold off on abx at this time and monitor clinically.  Tylenol 650mg q6h PRN for pain Dilaudid 0.5mg IV q6h PRN severe pain.   Zofran 4mg q6h PRN.

## 2017-10-11 NOTE — CONSULT NOTE ADULT - SUBJECTIVE AND OBJECTIVE BOX
NEPHROLOGY CONSULTATION NOTE    Patient is a 57y old  Female who presents with a chief complaint of severe abdominal pain.     HPI:   58 yo F w/ PMH of  ESRD s/p AVF placement in 9/2017 not yet on permanent HD, HTN, Polycythemia Vera, peptic ulcer disease, splenic infarct, splenomegaly presenting with severe abd pain since noon today. Pt was nauseous and vomited x1 NBNB yesterday. Felt like she had indigestion so she did eat since yesterday morning. She started having severe 10/10 abd pain in the periumbilical region, nonradiating, similar to prior episode of hemoperitoneum. Stomach feels bloated and "full" up to throat.     In September 2017, pt was admitted for hemoperitoneum likely 2/2 rupture of collateral mesenteric vessel. Peritoneal catheter was removed. AVF was placed on LUE and still maturing. Pt follows up with Dr. De León (Nephrology). Baseline potassium in 5s per family; last blood test showed potassium in 4s. Pt missed dose of Kayexalate today due to abd pain.     Cr baseline in 4s, up to 6.2 on discharge on 9/21. Today, Cr is 5.40.     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  Splenomegaly: 2015  Hypertension  Peptic ulcer disease  Polycythemia vera  Peritoneal dialysis catheter in place: Placed 8/9/2017    Allergies:  No Known Allergies    Home Medications Reviewed  Hospital Medications:   MEDICATIONS  (STANDING):    SOCIAL HISTORY:  Denies ETOH,Smoking,   FAMILY HISTORY:  Family history of malignant neoplasm (Grandparent)  Family history of hypertension in mother      REVIEW OF SYSTEMS:  CONSTITUTIONAL: No weakness, fevers or chills  EYES/ENT: No visual changes;  No vertigo or throat pain   NECK: No pain or stiffness  RESPIRATORY: No cough, wheezing, hemoptysis; No shortness of breath  CARDIOVASCULAR: No chest pain or palpitations.  GASTROINTESTINAL: No abdominal or epigastric pain. No nausea, vomiting, or hematemesis; No diarrhea or constipation. No melena or hematochezia.  GENITOURINARY: No dysuria, frequency, foamy urine, urinary urgency, incontinence or hematuria  NEUROLOGICAL: No numbness or weakness  SKIN: No itching, burning, rashes, or lesions   VASCULAR: No bilateral lower extremity edema.   All other review of systems is negative unless indicated above.    VITALS:  T(F): 98.2 (10-11-17 @ 14:05), Max: 98.4 (10-11-17 @ 13:04)  HR: 76 (10-11-17 @ 14:54)  BP: 134/76 (10-11-17 @ 14:54)  RR: 20 (10-11-17 @ 14:05)  SpO2: 97% (10-11-17 @ 14:54)  Wt(kg): --      PHYSICAL EXAM:  Constitutional: NAD  HEENT: anicteric sclera, oropharynx clear, MMM  Neck: No JVD  Respiratory: CTAB, no wheezes, rales or rhonchi  Cardiovascular: S1, S2, RRR  Gastrointestinal: BS+, soft, NT/ND  Extremities: No cyanosis or clubbing. No peripheral edema  Neurological: A/O x 3, no focal deficits  Psychiatric: Normal mood, normal affect  : No CVA tenderness. No topete.   Skin: No rashes  Vascular Access:    LABS:  10-11    135  |  98  |  45<H>  ----------------------------<  109<H>  6.2<HH>   |  18<L>  |  5.40<H>    Ca    9.0      11 Oct 2017 14:19    TPro  9.9<H>  /  Alb  4.9  /  TBili  1.4<H>  /  DBili      /  AST  49<H>  /  ALT  10  /  AlkPhos  175<H>  10-11    Creatinine Trend: 5.40 <--, 6.20 <--, 6.21 <--, 5.63 <--, 5.72 <--, 5.06 <--, 5.05 <--, 4.91 <--, 4.86 <--, 4.91 <--, 5.01 <--, 4.83 <--, 5.15 <--, 4.69 <--, 4.46 <--, 4.21 <--, 4.33 <--, 4.16 <--, 4.20 <--                        12.3   16.0  )-----------( 381      ( 11 Oct 2017 14:19 )             38.9     Urine Studies:              RADIOLOGY & ADDITIONAL STUDIES: NEPHROLOGY CONSULTATION NOTE    Patient is a 57y old  Female who presents with a chief complaint of severe abdominal pain.     HPI:   58 yo F w/ PMH of  ESRD s/p AVF placement in 9/2017 not yet on permanent HD, HTN, Polycythemia Vera, peptic ulcer disease, splenic infarct, splenomegaly presenting with severe abd pain since noon today. Pt has  had nausea and feeling of "indigestion" since last week with loss of appetite. Pt was nauseous and vomited x1 NBNB yesterday. Felt like she had indigestion so she did eat since yesterday morning. She started having severe 10/10 abd pain in the periumbilical region, nonradiating, similar to prior episode of hemoperitoneum. Stomach feels bloated and "full" up to throat.     In September 2017, pt was admitted for hemoperitoneum likely 2/2 rupture splenic varices. Peritoneal catheter was removed. AVF was placed on LUE and still maturing. Pt follows up with Dr. De León (Nephrology). Baseline potassium in 5s per family; last blood test showed potassium in 4s. Pt missed dose of Kayexalate today due to abd pain.     Cr baseline in 4s, up to 6.2 on discharge on 9/21. Today, Cr is 5.40.     Currently, pt still has severe abd pain with no other complaints. Denies fevers, chills, headache, dizziness, lightheadedness, CP, SOB, diarrhea, constipation, dysuria, urinary frequency.     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  Splenomegaly: 2015  Hypertension  Peptic ulcer disease  Polycythemia vera  Peritoneal dialysis catheter in place: Placed 8/9/2017    Allergies:  No Known Allergies    Home Medications Reviewed  Hospital Medications:   MEDICATIONS  (STANDING):    SOCIAL HISTORY:  Denies ETOH,Smoking,   FAMILY HISTORY:  Family history of malignant neoplasm (Grandparent)  Family history of hypertension in mother      REVIEW OF SYSTEMS:  CONSTITUTIONAL: No weakness, fevers or chills  EYES/ENT: No visual changes;  No vertigo or throat pain   NECK: No pain or stiffness  RESPIRATORY: No cough, wheezing, hemoptysis; No shortness of breath  CARDIOVASCULAR: No chest pain or palpitations.  GASTROINTESTINAL: +severe abd pain. +nausea, vomiting. No hematemesis; No diarrhea or constipation. No melena or hematochezia.  GENITOURINARY: No dysuria, frequency, foamy urine, urinary urgency, incontinence or hematuria  NEUROLOGICAL: No numbness or weakness  SKIN: No itching, burning, rashes, or lesions   VASCULAR: No bilateral lower extremity edema.   All other review of systems is negative unless indicated above.    VITALS:  T(F): 98.2 (10-11-17 @ 14:05), Max: 98.4 (10-11-17 @ 13:04)  HR: 76 (10-11-17 @ 14:54)  BP: 134/76 (10-11-17 @ 14:54)  RR: 20 (10-11-17 @ 14:05)  SpO2: 97% (10-11-17 @ 14:54)  Wt(kg): --      PHYSICAL EXAM:  Constitutional: Pt lying on bed in moderate distress  HEENT: anicteric sclera, oropharynx clear, MMM  Neck: No JVD  Respiratory: CTAB, no wheezes, rales or rhonchi  Cardiovascular: S1, S2, RRR  Gastrointestinal: BS+, soft, ND, tenderness greatest in periumbilical region exacerbated by palpating in other quadrants. Tenderness to percussion. Mild rebound tenderness. Abd tenderness on percussion on back.   Extremities: No cyanosis or clubbing. No peripheral edema  Neurological: A/O x 3, no focal deficits, no asterixis   Psychiatric: Normal mood, normal affect  : No CVA tenderness. No topete.   Skin: No rashes  Vascular Access:     LABS:  10-11    135  |  98  |  45<H>  ----------------------------<  109<H>  6.2<HH>   |  18<L>  |  5.40<H>    Ca    9.0      11 Oct 2017 14:19    TPro  9.9<H>  /  Alb  4.9  /  TBili  1.4<H>  /  DBili      /  AST  49<H>  /  ALT  10  /  AlkPhos  175<H>  10-11    Creatinine Trend: 5.40 <--, 6.20 <--, 6.21 <--, 5.63 <--, 5.72 <--, 5.06 <--, 5.05 <--, 4.91 <--, 4.86 <--, 4.91 <--, 5.01 <--, 4.83 <--, 5.15 <--, 4.69 <--, 4.46 <--, 4.21 <--, 4.33 <--, 4.16 <--, 4.20 <--                        12.3   16.0  )-----------( 381      ( 11 Oct 2017 14:19 )             38.9     Urine Studies:              RADIOLOGY & ADDITIONAL STUDIES:

## 2017-10-11 NOTE — ED ADULT NURSE NOTE - OBJECTIVE STATEMENT
57 y.o female presents with abdominal pain. hx of peritoneal dialysis, due to polycythemia vera and kidney disease, peritoneal catheter removed one week ago s/p abdominal hemorrhage.  pt is currently taking medication for control, no dialysis at this time, three weeks ago fistula was placed in her left upper arm. before arriving pt was given Dilaudid by family, but c.o continuing abdominal pain. pt is tearful, abdomen is tender and painful to palpate. pt continues to make urine. pt has been on dialysis one month ago. +nausea, one episode of vomiting yesterday. states " my stomach feels upset".   Patient undressed and placed into gown, call bell in hand and side rails up for safety. warm blanket provided, vital signs stable.

## 2017-10-11 NOTE — H&P ADULT - NSHPPHYSICALEXAM_GEN_ALL_CORE
Vital Signs Last 24 Hrs  T(C): 36.7 (10-11-17 @ 19:52)  T(F): 98.1 (10-11-17 @ 19:52), Max: 98.5 (10-11-17 @ 18:04)  HR: 79 (10-11-17 @ 19:52) (76 - 86)  BP: 115/74 (10-11-17 @ 19:52)  RR: 18 (10-11-17 @ 19:52) (17 - 20)    Physical Exam   Appearance: Normal	  HEENT:   Normal oral mucosa, PERRL, EOMI	  Lymphatic: No lymphadenopathy  Cardiovascular: Normal S1 S2, No JVD, No murmurs, No edema  Respiratory: Lungs clear to auscultation	  Psychiatry: A & O x 3, Mood & affect appropriate  Gastrointestinal:  Soft, Non-tender, + BS	  Skin: No rashes, No ecchymoses, No cyanosis	  Neurologic: Non-focal  Extremities: Normal range of motion, No clubbing, cyanosis or edema  Vascular: Peripheral pulses palpable 2+ bilaterally Vital Signs Last 24 Hrs  T(C): 36.7 (10-11-17 @ 19:52)  T(F): 98.1 (10-11-17 @ 19:52), Max: 98.5 (10-11-17 @ 18:04)  HR: 79 (10-11-17 @ 19:52) (76 - 86)  BP: 115/74 (10-11-17 @ 19:52)  RR: 18 (10-11-17 @ 19:52) (17 - 20)    Physical Exam   Appearance: Middle aged woman in NAD.   HEENT:   Normal oral mucosa, PERRL, EOMI. 	  Cardiovascular: Normal S1 S2, No JVD, No murmurs, No edema  Respiratory: Good air entry. Lungs clear to auscultation	  Psychiatry: A & O x 3, Mood & affect appropriate  Gastrointestinal:  Soft, diffusely tender, Non-distended. + BS	  Skin: LUE AVF +bruit. Well healing margins 	  Neurologic: Non focal deficits.   Extremities: Normal range of motion, No clubbing, cyanosis or edema.  Vascular: Peripheral pulses palpable 2+ bilaterally Vital Signs Last 24 Hrs  T(C): 36.7 (10-11-17 @ 19:52)  T(F): 98.1 (10-11-17 @ 19:52), Max: 98.5 (10-11-17 @ 18:04)  HR: 79 (10-11-17 @ 19:52) (76 - 86)  BP: 115/74 (10-11-17 @ 19:52)  RR: 18 (10-11-17 @ 19:52) (17 - 20)    Physical Exam   Appearance: Middle aged woman in NAD.   HEAD: normocephalic, atraumatic  ENT:   Normal oral mucosa, PERRL, EOMI. 	  heart: Normal S1 S2, No JVD, No murmurs, No edema  lung: Good air entry. Lungs clear to auscultation	  Psychiatry: A & O x 3, Mood & affect appropriate  Abdomen: Soft, diffusely tender, Non-distended. + BS	  Skin: LUE AVF +bruit. Well healing margins 	  Neurologic: Non focal deficits.   Extremities: Normal range of motion, No clubbing, cyanosis or edema.  Vascular: Peripheral pulses palpable 2+ bilaterally

## 2017-10-11 NOTE — CONSULT NOTE ADULT - ATTENDING COMMENTS
58 yo F w/ PMH of  ESRD s/p AVF placement in 9/2017 not yet on permanent HD, HTN, Polycythemia Vera, peptic ulcer disease, splenic infarct, splenomegaly presenting with severe abd pain since noon today. In September 2017, pt was admitted for hemoperitoneum likely 2/2 rupture splenic varices. Peritoneal catheter was removed. AVF was placed on LUE and still maturing. Her abdominal pain is concerning but CT neg for any surgical abdomen. She has a crt of 5 and n/v and decreased appetite. No asterxis and no rub, no metallic taste and BUN is not that high. Lipase is elevated but can be seen with CKD as well if CT neg. She likely has a viral GI illness. No urgent need for dialysis but will need to be monitored. If ongoing n/v and decreased appetite, might need to start hd to see if this is not uremic related symptoms if no other source found.  AVF is not mature and if starting HD, will need temp shiley before starting HD.    d/w with ER attending in detail and family at bedside      Yael Aranda MD  Cell   Pager   Office

## 2017-10-11 NOTE — ED PROVIDER NOTE - CARE PLAN
Principal Discharge DX:	Generalized abdominal pain  Secondary Diagnosis:	Splenic infarct  Secondary Diagnosis:	ESRD (end stage renal disease)

## 2017-10-11 NOTE — H&P ADULT - NSHPSOCIALHISTORY_GEN_ALL_CORE
Lives with  and daughter.   Independent of ADLs.   Retired. Previously .  Denies Etoh Use, cigarette or recreational drug use.

## 2017-10-11 NOTE — ED PROVIDER NOTE - OBJECTIVE STATEMENT
57 y.o. F with PMHx of ESRD, HTN, peptic ulcer disease, polycythemia vera, splenic infarct, splenomegaly, PSHx of peritoneal dialysis catheter (Aug 9th, 2017) presents to the ED today with complaints of abdominal pain, onset today. Peritoneal dialysis catheter removed 3 weeks ago. Awaiting permanent hemodialysis. Admits to nausea, vomiting x1. Denies diarrhea, blood in vomit, chest pain, or any other complaints at this time.   Last normal BM yesterday.     Dr. Azucena De León (nephrologist) [phone]  Dr. Owusu (on-call for [facility]) [phone]

## 2017-10-11 NOTE — CONSULT NOTE ADULT - ASSESSMENT
58 yo F w/ PMH of  ESRD s/p AVF placement in 9/2017 not yet on permanent HD, HTN, Polycythemia Vera, peptic ulcer disease, splenic infarct, splenomegaly presenting with severe abd pain since noon today.

## 2017-10-11 NOTE — H&P ADULT - ASSESSMENT
57 year old female history of ESRD s/p PD catheter placement 8/9 with removal of catheter 2/2 hemoperitoneum 9/18 (last session 3 weeks ago) , HTN, polycythemia vera 1 year ago, PUD admitted with abdominal pain of unclear etiology. 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

## 2017-10-11 NOTE — H&P ADULT - HISTORY OF PRESENT ILLNESS
57 year old female history of ESRD s/p PD catheter placement 8/9 with removal of catheter 2/2 hemoperitoneum 9/18 (last session 3 weeks ago) , HTN, polycythemia vera 1 year ago, PUD presents with abdominal pain x 1 day. Daughter and  at bedside providing translation services. Patient reports 57 year old Romansh speaking female history of ESRD s/p PD catheter placement 8/9 with removal of catheter 2/2 hemoperitoneum 9/18 (last session 3 weeks ago) , HTN, polycythemia vera, PUD presents with abdominal pain x 1 day. Daughter and  at bedside providing translation services. Patient reports severe 8-10/10 tearing abdominal pain located below the umbilicus and LLQ. No associated or alleviating factors. Tried to take Dilaudid 2mg tab and Tums at home without any relief. No association with food intake however reports that she had not eaten in 24hours. Patient reports pain was very similar to when she was diagnosed with the hemoperitoneum during her previous admission. Of note, patient reports 1 episode of NBNB emesis 2 days prior to admission and 1 week prior to admission.  +decreased PO intake and appetite- Last meal 10/10 in the AM. +fatigue + dull achy headache  Denies changes in diet,  fevers, chills,  diarrhea/constipation, dysuria, hematuria, increased urinary frequency. Denies radiation to back.   Denies recent travel or sick contacts. Denies weight loss.      At this time, patient reports abdominal pain is currently 3-4/10. Has not taken Amlodipine or Kayexylate for today.   Reports that she would like to try and eat.

## 2017-10-11 NOTE — CONSULT NOTE ADULT - PROBLEM SELECTOR RECOMMENDATION 2
awaiting L AVF maturation for HD. Stopped PD in 9/2017 due to hemoperitoneum  - awaiting L AVF maturation for HD. Stopped PD in 9/2017 due to hemoperitoneum  - no urgent need for HD today; possible need tomorrow   - cont to monitor Cr awaiting L AVF maturation for HD. Stopped PD in 9/2017 due to hemoperitoneum  - no urgent need for HD today;  - cont to monitor Cr

## 2017-10-12 ENCOUNTER — APPOINTMENT (OUTPATIENT)
Dept: NEPHROLOGY | Facility: CLINIC | Age: 57
End: 2017-10-12

## 2017-10-12 DIAGNOSIS — Z29.9 ENCOUNTER FOR PROPHYLACTIC MEASURES, UNSPECIFIED: ICD-10-CM

## 2017-10-12 DIAGNOSIS — E83.39 OTHER DISORDERS OF PHOSPHORUS METABOLISM: ICD-10-CM

## 2017-10-12 DIAGNOSIS — D45 POLYCYTHEMIA VERA: ICD-10-CM

## 2017-10-12 DIAGNOSIS — D64.9 ANEMIA, UNSPECIFIED: ICD-10-CM

## 2017-10-12 LAB
ALBUMIN SERPL ELPH-MCNC: 4 G/DL — SIGNIFICANT CHANGE UP (ref 3.3–5)
ALP SERPL-CCNC: 155 U/L — HIGH (ref 40–120)
ALT FLD-CCNC: 4 U/L RC — LOW (ref 10–45)
ANION GAP SERPL CALC-SCNC: 19 MMOL/L — HIGH (ref 5–17)
ANION GAP SERPL CALC-SCNC: 21 MMOL/L — HIGH (ref 5–17)
AST SERPL-CCNC: 11 U/L — SIGNIFICANT CHANGE UP (ref 10–40)
BASOPHILS # BLD AUTO: 0 K/UL — SIGNIFICANT CHANGE UP (ref 0–0.2)
BASOPHILS NFR BLD AUTO: 0 % — SIGNIFICANT CHANGE UP (ref 0–2)
BILIRUB SERPL-MCNC: 1.4 MG/DL — HIGH (ref 0.2–1.2)
BUN SERPL-MCNC: 51 MG/DL — HIGH (ref 7–23)
BUN SERPL-MCNC: 57 MG/DL — HIGH (ref 7–23)
CALCIUM SERPL-MCNC: 8.3 MG/DL — LOW (ref 8.4–10.5)
CALCIUM SERPL-MCNC: 8.7 MG/DL — SIGNIFICANT CHANGE UP (ref 8.4–10.5)
CHLORIDE SERPL-SCNC: 101 MMOL/L — SIGNIFICANT CHANGE UP (ref 96–108)
CHLORIDE SERPL-SCNC: 98 MMOL/L — SIGNIFICANT CHANGE UP (ref 96–108)
CO2 SERPL-SCNC: 16 MMOL/L — LOW (ref 22–31)
CO2 SERPL-SCNC: 19 MMOL/L — LOW (ref 22–31)
CREAT SERPL-MCNC: 5.38 MG/DL — HIGH (ref 0.5–1.3)
CREAT SERPL-MCNC: 5.8 MG/DL — HIGH (ref 0.5–1.3)
EOSINOPHIL # BLD AUTO: 0.2 K/UL — SIGNIFICANT CHANGE UP (ref 0–0.5)
EOSINOPHIL NFR BLD AUTO: 1 % — SIGNIFICANT CHANGE UP (ref 0–6)
GLUCOSE SERPL-MCNC: 102 MG/DL — HIGH (ref 70–99)
GLUCOSE SERPL-MCNC: 118 MG/DL — HIGH (ref 70–99)
HCT VFR BLD CALC: 33.4 % — LOW (ref 34.5–45)
HGB BLD-MCNC: 10.6 G/DL — LOW (ref 11.5–15.5)
LYMPHOCYTES # BLD AUTO: 0.8 K/UL — LOW (ref 1–3.3)
LYMPHOCYTES # BLD AUTO: 4 % — LOW (ref 13–44)
MAGNESIUM SERPL-MCNC: 2 MG/DL — SIGNIFICANT CHANGE UP (ref 1.6–2.6)
MCHC RBC-ENTMCNC: 23.2 PG — LOW (ref 27–34)
MCHC RBC-ENTMCNC: 31.7 GM/DL — LOW (ref 32–36)
MCV RBC AUTO: 73.2 FL — LOW (ref 80–100)
MONOCYTES # BLD AUTO: 0.6 K/UL — SIGNIFICANT CHANGE UP (ref 0–0.9)
MONOCYTES NFR BLD AUTO: 4 % — SIGNIFICANT CHANGE UP (ref 2–14)
NEUTROPHILS # BLD AUTO: 21.9 K/UL — HIGH (ref 1.8–7.4)
NEUTROPHILS NFR BLD AUTO: 89 % — HIGH (ref 43–77)
PHOSPHATE SERPL-MCNC: 5.3 MG/DL — HIGH (ref 2.5–4.5)
PLATELET # BLD AUTO: 407 K/UL — HIGH (ref 150–400)
POTASSIUM SERPL-MCNC: 4.8 MMOL/L — SIGNIFICANT CHANGE UP (ref 3.5–5.3)
POTASSIUM SERPL-MCNC: 5.4 MMOL/L — HIGH (ref 3.5–5.3)
POTASSIUM SERPL-SCNC: 4.8 MMOL/L — SIGNIFICANT CHANGE UP (ref 3.5–5.3)
POTASSIUM SERPL-SCNC: 5.4 MMOL/L — HIGH (ref 3.5–5.3)
PROT SERPL-MCNC: 8 G/DL — SIGNIFICANT CHANGE UP (ref 6–8.3)
RBC # BLD: 4.57 M/UL — SIGNIFICANT CHANGE UP (ref 3.8–5.2)
RBC # FLD: 18.7 % — HIGH (ref 10.3–14.5)
SODIUM SERPL-SCNC: 135 MMOL/L — SIGNIFICANT CHANGE UP (ref 135–145)
SODIUM SERPL-SCNC: 139 MMOL/L — SIGNIFICANT CHANGE UP (ref 135–145)
WBC # BLD: 23.6 K/UL — HIGH (ref 3.8–10.5)
WBC # FLD AUTO: 23.6 K/UL — HIGH (ref 3.8–10.5)

## 2017-10-12 PROCEDURE — 99222 1ST HOSP IP/OBS MODERATE 55: CPT | Mod: GC

## 2017-10-12 PROCEDURE — 99233 SBSQ HOSP IP/OBS HIGH 50: CPT | Mod: GC

## 2017-10-12 PROCEDURE — 93975 VASCULAR STUDY: CPT | Mod: 26

## 2017-10-12 PROCEDURE — 93010 ELECTROCARDIOGRAM REPORT: CPT

## 2017-10-12 RX ORDER — POLYETHYLENE GLYCOL 3350 17 G/17G
17 POWDER, FOR SOLUTION ORAL DAILY
Qty: 0 | Refills: 0 | Status: DISCONTINUED | OUTPATIENT
Start: 2017-10-12 | End: 2017-10-13

## 2017-10-12 RX ORDER — FUROSEMIDE 40 MG
60 TABLET ORAL DAILY
Qty: 0 | Refills: 0 | Status: DISCONTINUED | OUTPATIENT
Start: 2017-10-12 | End: 2017-10-13

## 2017-10-12 RX ORDER — METOCLOPRAMIDE HCL 10 MG
10 TABLET ORAL ONCE
Qty: 0 | Refills: 0 | Status: COMPLETED | OUTPATIENT
Start: 2017-10-12 | End: 2017-10-12

## 2017-10-12 RX ORDER — SENNA PLUS 8.6 MG/1
1 TABLET ORAL AT BEDTIME
Qty: 0 | Refills: 0 | Status: DISCONTINUED | OUTPATIENT
Start: 2017-10-12 | End: 2017-10-13

## 2017-10-12 RX ORDER — PANTOPRAZOLE SODIUM 20 MG/1
40 TABLET, DELAYED RELEASE ORAL
Qty: 0 | Refills: 0 | Status: DISCONTINUED | OUTPATIENT
Start: 2017-10-12 | End: 2017-10-13

## 2017-10-12 RX ADMIN — Medication 60 MILLIGRAM(S): at 17:55

## 2017-10-12 RX ADMIN — HYDROMORPHONE HYDROCHLORIDE 0.5 MILLIGRAM(S): 2 INJECTION INTRAMUSCULAR; INTRAVENOUS; SUBCUTANEOUS at 23:35

## 2017-10-12 RX ADMIN — HYDROMORPHONE HYDROCHLORIDE 0.5 MILLIGRAM(S): 2 INJECTION INTRAMUSCULAR; INTRAVENOUS; SUBCUTANEOUS at 10:31

## 2017-10-12 RX ADMIN — HYDROMORPHONE HYDROCHLORIDE 0.5 MILLIGRAM(S): 2 INJECTION INTRAMUSCULAR; INTRAVENOUS; SUBCUTANEOUS at 11:00

## 2017-10-12 RX ADMIN — Medication 1300 MILLIGRAM(S): at 13:33

## 2017-10-12 RX ADMIN — Medication 1300 MILLIGRAM(S): at 05:11

## 2017-10-12 RX ADMIN — Medication 5 MILLIGRAM(S): at 23:34

## 2017-10-12 RX ADMIN — ONDANSETRON 4 MILLIGRAM(S): 8 TABLET, FILM COATED ORAL at 19:39

## 2017-10-12 RX ADMIN — Medication 10 MILLIGRAM(S): at 14:13

## 2017-10-12 RX ADMIN — Medication 1300 MILLIGRAM(S): at 23:34

## 2017-10-12 RX ADMIN — SENNA PLUS 1 TABLET(S): 8.6 TABLET ORAL at 23:34

## 2017-10-12 NOTE — PROGRESS NOTE ADULT - SUBJECTIVE AND OBJECTIVE BOX
[facility] DIVISION OF KIDNEY DISEASES AND HYPERTENSION -- FOLLOW UP NOTE  --------------------------------------------------------------------------------  Chief Complaint: abdominal pain, CKDV    24 hour events/subjective: Pt says abdominal pain has almost resolved this AM. Says she had been feeling nauseated for the past few days, but the abdominal pain began yesterday and was severe. Did not notice any correlation between eating and having the abdominal pain. Feels her nausea has improved this AM. Does endorse some pruritis, mainly at site where PD catheter used to be.     Also says she has not had a BM for the past 4-5 days.       PAST HISTORY  --------------------------------------------------------------------------------  No significant changes to PMH, PSH, FHx, SHx, unless otherwise noted    ALLERGIES & MEDICATIONS  --------------------------------------------------------------------------------  Allergies    No Known Allergies    Intolerances      Standing Inpatient Medications  amLODIPine   Tablet 10 milliGRAM(s) Oral daily  sodium bicarbonate 1300 milliGRAM(s) Oral three times a day    PRN Inpatient Medications  acetaminophen   Tablet 650 milliGRAM(s) Oral every 6 hours PRN  acetaminophen   Tablet. 650 milliGRAM(s) Oral every 6 hours PRN  aluminum hydroxide/magnesium hydroxide/simethicone Suspension 30 milliLiter(s) Oral every 6 hours PRN  HYDROmorphone  Injectable 0.5 milliGRAM(s) IV Push every 6 hours PRN  ondansetron Injectable 4 milliGRAM(s) IV Push every 6 hours PRN      REVIEW OF SYSTEMS  --------------------------------------------------------------------------------  Gen: no fatigue, fevers/chills, weakness  Skin: No rashes  Respiratory: No dyspnea, cough, wheezing, hemoptysis  CV: No chest pain, PND, orthopnea  GI: mild abdominal pain, no diarrhea, + constipation, +nausea, no vomiting  : No dysuria, hematuria  MSK: No joint pain/swelling; no edema  Neuro: no confusion    VITALS/PHYSICAL EXAM  --------------------------------------------------------------------------------  T(C): 36.6 (10-12-17 @ 14:12), Max: 36.9 (10-11-17 @ 18:04)  HR: 88 (10-12-17 @ 14:12) (76 - 89)  BP: 115/77 (10-12-17 @ 14:12) (97/61 - 134/76)  RR: 18 (10-12-17 @ 14:12) (17 - 20)  SpO2: 97% (10-12-17 @ 14:12) (95% - 100%)  Wt(kg): --  Height (cm): 157.48 (10-11-17 @ 19:52)  Weight (kg): 51.8 (10-11-17 @ 19:52)  BMI (kg/m2): 20.9 (10-11-17 @ 19:52)  BSA (m2): 1.51 (10-11-17 @ 19:52)      10-11-17 @ 07:01  -  10-12-17 @ 07:00  --------------------------------------------------------  IN: 0 mL / OUT: 1015 mL / NET: -1015 mL    10-12-17 @ 07:01  -  10-12-17 @ 14:37  --------------------------------------------------------  IN: 0 mL / OUT: 200 mL / NET: -200 mL      Physical Exam:  	Gen: NAD, well-appearing female, lying fla tin bed  	HEENT: supple neck; moist oral mucosa  	Pulm: CTA B/L  	CV: RRR, S1S2; no rub  	Back: No spinal or CVA tenderness; no sacral edema  	Abd: +BS, soft, nontender/nondistended, no rebound or guarding  	: No suprapubic tenderness  	UE: Warm, FROM, no clubbing, intact strength; no edema  	LE: Warm, FROM, no clubbing, intact strength; no edema  	Neuro: No focal deficits  	Psych: Normal affect and mood  	Skin: Warm, without rashes  	Vascular access: LUE AVF - healing surgical scar, thrill palpable    LABS/STUDIES  --------------------------------------------------------------------------------              10.6   23.6  >-----------<  407      [10-12-17 @ 06:53]              33.4     139  |  101  |  51  ----------------------------<  102      [10-12-17 @ 06:53]  5.4   |  19  |  5.38        Ca     8.3     [10-12-17 @ 06:53]      Mg     2.0     [10-12-17 @ 06:53]      Phos  5.3     [10-12-17 @ 06:53]    TPro  8.0  /  Alb  4.0  /  TBili  1.4  /  DBili  x   /  AST  11  /  ALT  4   /  AlkPhos  155  [10-12-17 @ 06:53]    PT/INR: PT 12.8 , INR 1.18       [10-11-17 @ 14:19]  PTT: 33.8       [10-11-17 @ 14:19]      Creatinine Trend:  SCr 5.38 [10-12 @ 06:53]  SCr 5.21 [10-11 @ 20:28]  SCr 5.40 [10-11 @ 14:19]  SCr 6.20 [09-21 @ 08:29]  SCr 6.21 [09-20 @ 09:06]    Urinalysis - [10-11-17 @ 15:16]      Color Colorless / Appearance Clear / SG 1.008 / pH 7.0      Gluc Negative / Ketone Negative  / Bili Negative / Urobili Negative       Blood Negative / Protein 100 / Leuk Est Trace / Nitrite Negative      RBC  / WBC 10-25 / Hyaline  / Gran  / Sq Epi  / Non Sq Epi  / Bacteria       HbA1c 5.7      [01-18-17 @ 07:32]  TSH 1.15      [01-18-17 @ 07:32]

## 2017-10-12 NOTE — PROGRESS NOTE ADULT - PROBLEM SELECTOR PLAN 4
-Patient with history of PCV, chronic leukocytosis  -WBC 23.6, on chart review patient's WBCs can climb as high as 25 at baseline  -Afebrile, no other indicators of acute bacterial infection, no need for antibiotics at this time

## 2017-10-12 NOTE — PROGRESS NOTE ADULT - PROBLEM SELECTOR PLAN 2
- likely 2/2 CKD   - monitor w/ EKG  - would hold off any kayexalate at this time while working up GI complaints  - can give lasix (pt on lasix 60mg PO daily at home)

## 2017-10-12 NOTE — PROGRESS NOTE ADULT - PROBLEM SELECTOR PLAN 3
- likely 2/2 CKD  - stable   - repeat APCT w/o contrast not showing any increase in size of hemoperitoneum

## 2017-10-12 NOTE — PROGRESS NOTE ADULT - ASSESSMENT
Pt is a 57yoF w/ PMH of ESRD s/p AVF placement in 9/2017 not yet on HD, HTN, PCV, PUD, splenic infarct, splenomegaly who p/w severe abdominal pain x1 day. Pt admitted in September 2017 for hemoperitoneum likely 2/2 rupture splenic varices, and her PD was stopped and catheter removed about 2-3 weeks ago.

## 2017-10-12 NOTE — PROGRESS NOTE ADULT - ASSESSMENT
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

## 2017-10-12 NOTE — PROGRESS NOTE ADULT - PROBLEM SELECTOR PLAN 7
-No chemical prophylaxis given patient's peritoneal hematoma, will continues SCDs    Mick Garcia, PGY-2  Pager #[phone]

## 2017-10-12 NOTE — CONSULT NOTE ADULT - ASSESSMENT
57 year old Italian speaking female history of ESRD s/p PD catheter placement 8/9 with removal of catheter 2/2 hemoperitoneum 9/18 (last session 3 weeks ago) , HTN, polycythemia vera c/b splenic infarcts, PUD presents with abdominal pain  Impression:   1. Diffuse abdominal pain worse in the epigastrium   2. Nausea, vomiting, anorexia  3. H/o hemoperitoneum with resolving LUQ hematoma  4. Polycythemia vera c/b splenic infarcts  5. ESRD awaiting fistula maturation  6. PUD    DDx: PUD, peritonitis, cholecystitis, pancreatitis, splenic infarcts, portal/splenic thrombosis, gastroenteritis   Plan   1. Follow up US of the abdomen to r/o cholecystitis, portal/splenic thrombosis. If significant ascites would benefit from diagnostic para to r/o bacterial infection although low suspicion as afebrile without signs of infection.   2. Restart PPI  3. Continue with pain control, renal diet as tolerated

## 2017-10-12 NOTE — PROGRESS NOTE ADULT - PROBLEM SELECTOR PLAN 1
-Patient reported to hospital with generalized, severe abdominal pain and tenderness, she reports her abdomen was distended. CT A/P showed no evidence of surgical abdomen and peritoneal hematoma was improved  -Now symptoms resolved, abdomen nontender, and soft  -Will follow up abdominal ultrasound with duplex  -Low suspicion of SBP as patient does not have ascites on imaging, afebrile, and now nontender/nondistended, leukocytosis is chronic from PCV  -Follow up GI recommendations, consulted overnight  -Lipase elevated, but this can be seen in CKD and no evidence of pancreatitis on CT  -Potentially 2/2 viral gastroenteritis, but will r/o more serious etiologies and control pain

## 2017-10-12 NOTE — PROGRESS NOTE ADULT - PROBLEM SELECTOR PLAN 3
-Patient with ESRD, previous peritoneal dialysis now with AVF awaiting maturation  -Follow up further renal recommendations, no urgent need for HD at this time  -Renal restricted diet

## 2017-10-12 NOTE — PROGRESS NOTE ADULT - SUBJECTIVE AND OBJECTIVE BOX
Patient is a 57y old  Female who presents with a chief complaint of abdominal pain (11 Oct 2017 19:46)      INTERVAL HPI/OVERNIGHT EVENTS: Patient admitted overnight. Given kayexalate for hyperkalemia, repeat K 5.3. 5.4 this AM. She reports that her symptoms have resolved. She states that she has no more abdominal pain and her abdomen in no longer tense. She vomiting once last night, but now reports improvement in nausea. She denies CP, SOB, fevers/chills.    T(C): 36.8 (10-12-17 @ 04:58), Max: 36.9 (10-11-17 @ 13:04)  HR: 89 (10-12-17 @ 04:58) (76 - 89)  BP: 97/61 (10-12-17 @ 04:58) (97/61 - 134/76)  RR: 18 (10-12-17 @ 04:58) (17 - 20)  SpO2: 95% (10-12-17 @ 04:58) (95% - 100%)  Wt(kg): --  I&O's Summary    11 Oct 2017 07:01  -  12 Oct 2017 07:00  --------------------------------------------------------  IN: 0 mL / OUT: 1015 mL / NET: -1015 mL        LABS:                        10.6   23.6  )-----------( 407      ( 12 Oct 2017 06:53 )             33.4     10-12    139  |  101  |  51<H>  ----------------------------<  102<H>  5.4<H>   |  19<L>  |  5.38<H>    Ca    8.3<L>      12 Oct 2017 06:53  Phos  5.3     10-12  Mg     2.0     10-12    TPro  8.0  /  Alb  4.0  /  TBili  1.4<H>  /  DBili  x   /  AST  11  /  ALT  4<L>  /  AlkPhos  155<H>  10-12    PT/INR - ( 11 Oct 2017 14:19 )   PT: 12.8 sec;   INR: 1.18 ratio         PTT - ( 11 Oct 2017 14:19 )  PTT:33.8 sec  Urinalysis Basic - ( 11 Oct 2017 15:16 )    Color: Colorless / Appearance: Clear / S.008 / pH: x  Gluc: x / Ketone: Negative  / Bili: Negative / Urobili: Negative   Blood: x / Protein: 100 mg/dL / Nitrite: Negative   Leuk Esterase: Trace / RBC: x / WBC 10-25 /HPF   Sq Epi: x / Non Sq Epi: x / Bacteria: x      CAPILLARY BLOOD GLUCOSE            Urinalysis Basic - ( 11 Oct 2017 15:16 )    Color: Colorless / Appearance: Clear / S.008 / pH: x  Gluc: x / Ketone: Negative  / Bili: Negative / Urobili: Negative   Blood: x / Protein: 100 mg/dL / Nitrite: Negative   Leuk Esterase: Trace / RBC: x / WBC 10-25 /HPF   Sq Epi: x / Non Sq Epi: x / Bacteria: x      REVIEW OF SYSTEMS:  CONSTITUTIONAL: No fever, weight loss, or fatigue  EYES: No eye pain, visual disturbances, or discharge  ENMT:  No difficulty hearing, tinnitus, vertigo; No sinus or throat pain  NECK: No pain or stiffness  BREASTS: No pain, masses, or nipple discharge  RESPIRATORY: No cough, wheezing, chills or hemoptysis; No shortness of breath  CARDIOVASCULAR: No chest pain, palpitations, dizziness, or leg swelling  GASTROINTESTINAL: No abdominal or epigastric pain. No nausea, vomiting, or hematemesis; No diarrhea or constipation. No melena or hematochezia.  GENITOURINARY: Negative  NEUROLOGICAL: No headaches, memory loss, loss of strength, numbness, or tremors  SKIN: No itching, burning, rashes, or lesions   LYMPH NODES: No enlarged glands  ENDOCRINE: No heat or cold intolerance; No hair loss  MUSCULOSKELETAL: No joint pain or swelling; No muscle, back, or extremity pain  PSYCHIATRIC: No depression, anxiety, mood swings, or difficulty sleeping  HEME/LYMPH: No easy bruising, or bleeding gums  ALLERY AND IMMUNOLOGIC: No hives or eczema    RADIOLOGY & ADDITIONAL TESTS:    Imaging Personally Reviewed:  [x ] YES  [ ] NO, CT A/P    Consultant(s) Notes Reviewed:  [x ] YES  [ ] NO, Nephrology    PHYSICAL EXAM:  GENERAL: NAD, laying in bed  HEAD:  Atraumatic, Normocephalic  EYES: EOMI, PERRLA, conjunctiva and sclera clear  ENMT: No tonsillar erythema, exudates, or enlargement; Moist mucous membranes, Good dentition, No lesions  NECK: Supple, No JVD, Normal thyroid  NERVOUS SYSTEM:  Alert & Oriented X3, Good concentration  CHEST/LUNG: Clear to auscultation bilaterally; No rales, rhonchi, wheezing, or rubs  HEART: Regular rate and rhythm; No murmurs, rubs, or gallops  ABDOMEN: Soft, Nontender, Nondistended; Abdominal scars present  EXTREMITIES:  2+ Peripheral Pulses, No clubbing, cyanosis, or edema  LYMPH: No lymphadenopathy noted  SKIN: No rashes or lesions    Care Discussed with Consultants/Other Providers [ ] YES  [x ] NO

## 2017-10-12 NOTE — PROGRESS NOTE ADULT - PROBLEM SELECTOR PLAN 1
- now off PD since her hemoperitoneum in 9/2017  - had LUE AVF created last month   - follows w/ Dr. De León for Nephro  - baseline sCr ~5s  - being monitored off HD  - PLEASE avoid contrast studies  - no need for urgent HD at this time

## 2017-10-12 NOTE — PROGRESS NOTE ADULT - PROBLEM SELECTOR PLAN 2
-Patient with K 5.4 this AM, ESRD patient  -Will repeat this afternoon as patient received kayexalate yesterday  -Will administer another dose if potassium continues to climb  -Renal restricted diet  -C/w home standing kayexalate dosage Monday, Wednesday, and Friday

## 2017-10-12 NOTE — CONSULT NOTE ADULT - SUBJECTIVE AND OBJECTIVE BOX
HPI: Translation provided by daughter over phone as per pt preference   57 year old Kazakh speaking female history of ESRD s/p PD catheter placement  with removal of catheter / hemoperitoneum  (last session 3 weeks ago) , HTN, polycythemia vera c/b splenic infarcts, PUD presents with abdominal pain x 1 day. Patient reports severe 8-10/10 tearing burning abdominal pain located in the epigastric region and below the umbilicus. Alleviated slightly by laying on her left side. Tried to take Dilaudid 2mg tab and Tums at home without any relief. States food at times makes it better, other times exacerbates her pain. Patient reports pain was very similar to when she was diagnosed with the hemoperitoneum during her previous admission. Of note, reports 1 week history of nausea and decreased PO, NBNB emesis 2 days prior to admission and 3 episodes overnight. Denies associated diarrhea, melena, hematochezia, fevers. Denies radiation of pain. Has an EGD 2 years ago that was significant for peptic ulcers and gastritis.  Denies recent travel or sick contacts. Denies weight loss.    This morning, reports significant improvement of her pain, reports it is 4/10. Complains of burning epigastric pain.       PAST MEDICAL & SURGICAL HISTORY:  ESRD on peritoneal dialysis  Splenic infarct: treated conservatively 2015  Splenomegaly:   Hypertension  Peptic ulcer disease  Polycythemia vera  AV fistula: Placed   Hemoperitoneum as complication of peritoneal dialysis: s/p removal   Peritoneal dialysis catheter in place: Placed 2017      REVIEW OF SYSTEMS  CONSTITUTIONAL:  +chills No weight loss, feve, weakness or fatigue.  HEENT:  Eyes:  No visual loss, blurred vision, double vision or yellow sclerae. Ears, Nose, Throat:  No hearing loss, sneezing, congestion, runny nose or sore throat.  SKIN:  No rash or itching.  CARDIOVASCULAR:  No chest pain, chest pressure or chest discomfort. No palpitations or edema.  RESPIRATORY:  No shortness of breath, cough or sputum.  GASTROINTESTINAL:  +anorexia, nausea and vomiting +abdominal pain. No diarrhea. No blood.  GENITOURINARY:  Denies hematuria, dysuria.   NEUROLOGICAL:  +headache No dizziness, syncope, paralysis, ataxia, numbness or tingling in the extremities. No change in bowel or bladder control.  MUSCULOSKELETAL:  No muscle, back pain, joint pain or stiffness.  HEMATOLOGIC:  No anemia, bleeding or bruising.  LYMPHATICS:  No enlarged nodes. No history of splenectomy.  PSYCHIATRIC:  No history of depression or anxiety.  ENDOCRINOLOGIC:  No reports of sweating, cold or heat intolerance. No polyuria or polydipsia.  ALLERGIES:  No history of asthma, hives, eczema or rhinitis.    MEDICATIONS  (STANDING):  amLODIPine   Tablet 10 milliGRAM(s) Oral daily  metoclopramide Injectable 10 milliGRAM(s) IV Push once  sodium bicarbonate 1300 milliGRAM(s) Oral three times a day  sodium polystyrene sulfonate Suspension 30 Gram(s) Oral <User Schedule>    MEDICATIONS  (PRN):  acetaminophen   Tablet 650 milliGRAM(s) Oral every 6 hours PRN For Temp greater than 38 C (100.4 F)  acetaminophen   Tablet. 650 milliGRAM(s) Oral every 6 hours PRN Mild and Moderate pain  aluminum hydroxide/magnesium hydroxide/simethicone Suspension 30 milliLiter(s) Oral every 6 hours PRN Dyspepsia  HYDROmorphone  Injectable 0.5 milliGRAM(s) IV Push every 6 hours PRN Severe Pain (7 - 10)  ondansetron Injectable 4 milliGRAM(s) IV Push every 6 hours PRN Nausea and/or Vomiting      Allergies    No Known Allergies    Intolerances        SOCIAL HISTORY:    FAMILY HISTORY:  Family history of malignant neoplasm (Grandparent)  Family history of hypertension in mother      Vital Signs Last 24 Hrs  T(C): 36.8 (12 Oct 2017 04:58), Max: 36.9 (11 Oct 2017 13:04)  T(F): 98.3 (12 Oct 2017 04:58), Max: 98.5 (11 Oct 2017 18:04)  HR: 89 (12 Oct 2017 04:58) (76 - 89)  BP: 97/61 (12 Oct 2017 04:58) (97/61 - 134/76)  BP(mean): --  RR: 18 (12 Oct 2017 04:58) (17 - 20)  SpO2: 95% (12 Oct 2017 04:58) (95% - 100%)    PHYSICAL EXAM:  General: WN/WD NAD  Neurology: A&Ox3, nonfocal, RAHMAN x 4  Eyes: PERRLA/ EOMI, Gross vision intact  ENT/Neck: Neck supple, trachea midline, No JVD, Gross hearing intact  Respiratory: CTA B/L, No wheezing, rales, rhonchi  CV: RRR, S1S2, no murmurs, rubs or gallops  Abdominal: Soft, non distended. Diffuse pain to deep palpation, worse in the epigastric region. Tenderness in RUQ on deep inspiration. BS present in all 4 quadrants.   Rectal exam: brown stool, guaiac negative   Extremities: LUE fistula with patent flow. No edema, + peripheral pulses  Skin: No Rashes, Hematoma, Ecchymosis      LABS:                        10.6   23.6  )-----------( 407      ( 12 Oct 2017 06:53 )             33.4     10    139  |  101  |  51<H>  ----------------------------<  102<H>  5.4<H>   |  19<L>  |  5.38<H>    Ca    8.3<L>      12 Oct 2017 06:53  Phos  5.3     10  Mg     2.0     10-12    TPro  8.0  /  Alb  4.0  /  TBili  1.4<H>  /  DBili  x   /  AST  11  /  ALT  4<L>  /  AlkPhos  155<H>  10-12    PT/INR - ( 11 Oct 2017 14:19 )   PT: 12.8 sec;   INR: 1.18 ratio         PTT - ( 11 Oct 2017 14:19 )  PTT:33.8 sec  Urinalysis Basic - ( 11 Oct 2017 15:16 )    Color: Colorless / Appearance: Clear / S.008 / pH: x  Gluc: x / Ketone: Negative  / Bili: Negative / Urobili: Negative   Blood: x / Protein: 100 mg/dL / Nitrite: Negative   Leuk Esterase: Trace / RBC: x / WBC 10-25 /HPF   Sq Epi: x / Non Sq Epi: x / Bacteria: x    Imaging reviewed: Splenomegaly, punctate calcifications   Resolving LUQ hematoma. No ascites. Pancreas, liver, gall bladder unremarkable  Gastric, perisplenic, peripancreatic varices      RADIOLOGY & ADDITIONAL STUDIES:

## 2017-10-13 ENCOUNTER — TRANSCRIPTION ENCOUNTER (OUTPATIENT)
Age: 57
End: 2017-10-13

## 2017-10-13 VITALS
RESPIRATION RATE: 18 BRPM | HEART RATE: 82 BPM | DIASTOLIC BLOOD PRESSURE: 78 MMHG | TEMPERATURE: 98 F | OXYGEN SATURATION: 99 % | SYSTOLIC BLOOD PRESSURE: 118 MMHG

## 2017-10-13 LAB
ALBUMIN SERPL ELPH-MCNC: 3.8 G/DL — SIGNIFICANT CHANGE UP (ref 3.3–5)
ALP SERPL-CCNC: 142 U/L — HIGH (ref 40–120)
ALT FLD-CCNC: 5 U/L RC — LOW (ref 10–45)
ANION GAP SERPL CALC-SCNC: 20 MMOL/L — HIGH (ref 5–17)
AST SERPL-CCNC: 10 U/L — SIGNIFICANT CHANGE UP (ref 10–40)
BILIRUB SERPL-MCNC: 1.3 MG/DL — HIGH (ref 0.2–1.2)
BUN SERPL-MCNC: 57 MG/DL — HIGH (ref 7–23)
CALCIUM SERPL-MCNC: 8.5 MG/DL — SIGNIFICANT CHANGE UP (ref 8.4–10.5)
CHLORIDE SERPL-SCNC: 98 MMOL/L — SIGNIFICANT CHANGE UP (ref 96–108)
CO2 SERPL-SCNC: 19 MMOL/L — LOW (ref 22–31)
CREAT SERPL-MCNC: 5.88 MG/DL — HIGH (ref 0.5–1.3)
GLUCOSE SERPL-MCNC: 83 MG/DL — SIGNIFICANT CHANGE UP (ref 70–99)
HCT VFR BLD CALC: 27.9 % — LOW (ref 34.5–45)
HGB BLD-MCNC: 9.4 G/DL — LOW (ref 11.5–15.5)
MAGNESIUM SERPL-MCNC: 2.1 MG/DL — SIGNIFICANT CHANGE UP (ref 1.6–2.6)
MCHC RBC-ENTMCNC: 24.6 PG — LOW (ref 27–34)
MCHC RBC-ENTMCNC: 33.8 GM/DL — SIGNIFICANT CHANGE UP (ref 32–36)
MCV RBC AUTO: 72.8 FL — LOW (ref 80–100)
PHOSPHATE SERPL-MCNC: 5.6 MG/DL — HIGH (ref 2.5–4.5)
PLATELET # BLD AUTO: 280 K/UL — SIGNIFICANT CHANGE UP (ref 150–400)
POTASSIUM SERPL-MCNC: 4.5 MMOL/L — SIGNIFICANT CHANGE UP (ref 3.5–5.3)
POTASSIUM SERPL-SCNC: 4.5 MMOL/L — SIGNIFICANT CHANGE UP (ref 3.5–5.3)
PROT SERPL-MCNC: 7.7 G/DL — SIGNIFICANT CHANGE UP (ref 6–8.3)
RBC # BLD: 3.84 M/UL — SIGNIFICANT CHANGE UP (ref 3.8–5.2)
RBC # FLD: 18.3 % — HIGH (ref 10.3–14.5)
SODIUM SERPL-SCNC: 137 MMOL/L — SIGNIFICANT CHANGE UP (ref 135–145)
WBC # BLD: 12.9 K/UL — HIGH (ref 3.8–10.5)
WBC # FLD AUTO: 12.9 K/UL — HIGH (ref 3.8–10.5)

## 2017-10-13 PROCEDURE — 83605 ASSAY OF LACTIC ACID: CPT

## 2017-10-13 PROCEDURE — 96375 TX/PRO/DX INJ NEW DRUG ADDON: CPT

## 2017-10-13 PROCEDURE — 83735 ASSAY OF MAGNESIUM: CPT

## 2017-10-13 PROCEDURE — 99233 SBSQ HOSP IP/OBS HIGH 50: CPT | Mod: GC

## 2017-10-13 PROCEDURE — 84295 ASSAY OF SERUM SODIUM: CPT

## 2017-10-13 PROCEDURE — 96374 THER/PROPH/DIAG INJ IV PUSH: CPT

## 2017-10-13 PROCEDURE — 83690 ASSAY OF LIPASE: CPT

## 2017-10-13 PROCEDURE — 82330 ASSAY OF CALCIUM: CPT

## 2017-10-13 PROCEDURE — 84100 ASSAY OF PHOSPHORUS: CPT

## 2017-10-13 PROCEDURE — 99232 SBSQ HOSP IP/OBS MODERATE 35: CPT | Mod: GC

## 2017-10-13 PROCEDURE — 81001 URINALYSIS AUTO W/SCOPE: CPT

## 2017-10-13 PROCEDURE — 85610 PROTHROMBIN TIME: CPT

## 2017-10-13 PROCEDURE — 82435 ASSAY OF BLOOD CHLORIDE: CPT

## 2017-10-13 PROCEDURE — 93975 VASCULAR STUDY: CPT

## 2017-10-13 PROCEDURE — 74176 CT ABD & PELVIS W/O CONTRAST: CPT

## 2017-10-13 PROCEDURE — 82803 BLOOD GASES ANY COMBINATION: CPT

## 2017-10-13 PROCEDURE — 99285 EMERGENCY DEPT VISIT HI MDM: CPT | Mod: 25

## 2017-10-13 PROCEDURE — 84132 ASSAY OF SERUM POTASSIUM: CPT

## 2017-10-13 PROCEDURE — 80048 BASIC METABOLIC PNL TOTAL CA: CPT

## 2017-10-13 PROCEDURE — 82947 ASSAY GLUCOSE BLOOD QUANT: CPT

## 2017-10-13 PROCEDURE — 85730 THROMBOPLASTIN TIME PARTIAL: CPT

## 2017-10-13 PROCEDURE — 93005 ELECTROCARDIOGRAM TRACING: CPT

## 2017-10-13 PROCEDURE — 80053 COMPREHEN METABOLIC PANEL: CPT

## 2017-10-13 PROCEDURE — 85014 HEMATOCRIT: CPT

## 2017-10-13 PROCEDURE — 85027 COMPLETE CBC AUTOMATED: CPT

## 2017-10-13 RX ORDER — FUROSEMIDE 40 MG
3 TABLET ORAL
Qty: 0 | Refills: 0 | COMMUNITY

## 2017-10-13 RX ORDER — LANOLIN ALCOHOL/MO/W.PET/CERES
5 CREAM (GRAM) TOPICAL ONCE
Qty: 0 | Refills: 0 | Status: COMPLETED | OUTPATIENT
Start: 2017-10-13 | End: 2017-10-13

## 2017-10-13 RX ORDER — PANTOPRAZOLE SODIUM 20 MG/1
1 TABLET, DELAYED RELEASE ORAL
Qty: 30 | Refills: 0 | OUTPATIENT
Start: 2017-10-13 | End: 2017-11-12

## 2017-10-13 RX ORDER — AMLODIPINE BESYLATE 2.5 MG/1
1 TABLET ORAL
Qty: 30 | Refills: 0 | OUTPATIENT
Start: 2017-10-13 | End: 2017-11-12

## 2017-10-13 RX ORDER — SODIUM POLYSTYRENE SULFONATE 4.1 MEQ/G
60 POWDER, FOR SUSPENSION ORAL
Qty: 0 | Refills: 0 | COMMUNITY

## 2017-10-13 RX ADMIN — Medication 1300 MILLIGRAM(S): at 13:34

## 2017-10-13 RX ADMIN — ONDANSETRON 4 MILLIGRAM(S): 8 TABLET, FILM COATED ORAL at 12:11

## 2017-10-13 RX ADMIN — PANTOPRAZOLE SODIUM 40 MILLIGRAM(S): 20 TABLET, DELAYED RELEASE ORAL at 08:27

## 2017-10-13 RX ADMIN — AMLODIPINE BESYLATE 10 MILLIGRAM(S): 2.5 TABLET ORAL at 06:00

## 2017-10-13 RX ADMIN — POLYETHYLENE GLYCOL 3350 17 GRAM(S): 17 POWDER, FOR SOLUTION ORAL at 12:09

## 2017-10-13 RX ADMIN — Medication 5 MILLIGRAM(S): at 01:59

## 2017-10-13 RX ADMIN — HYDROMORPHONE HYDROCHLORIDE 0.5 MILLIGRAM(S): 2 INJECTION INTRAMUSCULAR; INTRAVENOUS; SUBCUTANEOUS at 00:10

## 2017-10-13 RX ADMIN — Medication 1300 MILLIGRAM(S): at 06:00

## 2017-10-13 RX ADMIN — Medication 60 MILLIGRAM(S): at 06:00

## 2017-10-13 NOTE — PROGRESS NOTE ADULT - PROBLEM SELECTOR PLAN 7
-No chemical prophylaxis given patient's peritoneal hematoma, will continues SCDs    Mick Garcia, PGY-2  Pager #[phone]

## 2017-10-13 NOTE — PROGRESS NOTE ADULT - PROBLEM SELECTOR PLAN 4
-Patient with history of PCV, chronic leukocytosis  -Leukocytosis at baseline  -Afebrile, no other indicators of acute bacterial infection, no need for antibiotics at this time

## 2017-10-13 NOTE — DISCHARGE NOTE ADULT - HOSPITAL COURSE
57 year-old F with PMH of ESRD previously on PD, abdominal hematoma, PCV, and HTN who presented to the hospital with severe, generalized abdominal pain. The patient had a CT abdomen that was negative for acute pathology, showing interval improvement of her hematoma. She was given IV Dilaudid for pain control and GI was consulted. Nephrology was also called as the patient was found to have a potassium over 6. She was given Kayexalate and it was determined that urgent HD was not required as the potassium was 5.3 on repeat. The patient's symptoms resolved and she had an abdominal ultrasound which showed a chronically thrombosed splenic vein. Given her resolution of symptoms and two repeat potassium values under 5, she is now medically stable and cleared for discharge with nephrology follow up.

## 2017-10-13 NOTE — PROGRESS NOTE ADULT - PROBLEM SELECTOR PLAN 1
- now off PD since her hemoperitoneum in 9/2017  - had LUE AVF created last month   - follows w/ Dr. De León for Nephro  - baseline sCr ~5s  - being monitored off HD  - PLEASE avoid contrast studies  - no need for urgent HD at this time   - nausea could be due to uremia; but has resolved w/ PPIs  - c/w PPI - now off PD since her hemoperitoneum in 9/2017  - had LUE AVF created last month   - follows w/ Dr. De León for Nephro  - baseline sCr ~5s  - being monitored off HD  - PLEASE avoid contrast studies  - no need for urgent HD at this time   - nausea could be due to uremia; but has resolved w/ PPIs  - c/w PPI   - pt to f/u w/ Dr. De León on 10/18 at 10:30AM

## 2017-10-13 NOTE — PROGRESS NOTE ADULT - PROBLEM SELECTOR PLAN 3
-Patient with ESRD, previous peritoneal dialysis now with AVF awaiting maturation  -Renal recommendations appreciated, no urgent need for HD  -Renal restricted diet

## 2017-10-13 NOTE — PROGRESS NOTE ADULT - SUBJECTIVE AND OBJECTIVE BOX
Patient is a 57y old  Female who presents with a chief complaint of abdominal pain (11 Oct 2017 19:46)      INTERVAL HPI/OVERNIGHT EVENTS: No acute events overnight. Potassium repeated, no need for extra Lasix. Patient reports all symptoms markedly improved. She states, "Let's go home."    T(C): 36.6 (10-13-17 @ 04:58), Max: 36.7 (10-12-17 @ 21:32)  HR: 78 (10-13-17 @ 04:58) (78 - 88)  BP: 123/80 (10-13-17 @ 04:58) (115/77 - 123/80)  RR: 18 (10-13-17 @ 04:58) (18 - 18)  SpO2: 97% (10-13-17 @ 04:58) (97% - 98%)  Wt(kg): --  I&O's Summary    12 Oct 2017 07:01  -  13 Oct 2017 07:00  --------------------------------------------------------  IN: 0 mL / OUT: 201 mL / NET: -201 mL        LABS:                        10.6   23.6  )-----------( 407      ( 12 Oct 2017 06:53 )             33.4     1012    135  |  98  |  57<H>  ----------------------------<  118<H>  4.8   |  16<L>  |  5.80<H>    Ca    8.7      12 Oct 2017 18:20  Phos  5.3     10-12  Mg     2.0     10-12    TPro  8.0  /  Alb  4.0  /  TBili  1.4<H>  /  DBili  x   /  AST  11  /  ALT  4<L>  /  AlkPhos  155<H>  10-12    PT/INR - ( 11 Oct 2017 14:19 )   PT: 12.8 sec;   INR: 1.18 ratio         PTT - ( 11 Oct 2017 14:19 )  PTT:33.8 sec  Urinalysis Basic - ( 11 Oct 2017 15:16 )    Color: Colorless / Appearance: Clear / S.008 / pH: x  Gluc: x / Ketone: Negative  / Bili: Negative / Urobili: Negative   Blood: x / Protein: 100 mg/dL / Nitrite: Negative   Leuk Esterase: Trace / RBC: x / WBC 10-25 /HPF   Sq Epi: x / Non Sq Epi: x / Bacteria: x      CAPILLARY BLOOD GLUCOSE            Urinalysis Basic - ( 11 Oct 2017 15:16 )    Color: Colorless / Appearance: Clear / S.008 / pH: x  Gluc: x / Ketone: Negative  / Bili: Negative / Urobili: Negative   Blood: x / Protein: 100 mg/dL / Nitrite: Negative   Leuk Esterase: Trace / RBC: x / WBC 10-25 /HPF   Sq Epi: x / Non Sq Epi: x / Bacteria: x      REVIEW OF SYSTEMS:  CONSTITUTIONAL: No fever, weight loss, or fatigue  EYES: No eye pain, visual disturbances, or discharge  ENMT:  No difficulty hearing, tinnitus, vertigo; No sinus or throat pain  NECK: No pain or stiffness  BREASTS: No pain, masses, or nipple discharge  RESPIRATORY: No cough, wheezing, chills or hemoptysis; No shortness of breath  CARDIOVASCULAR: No chest pain, palpitations, dizziness, or leg swelling  GASTROINTESTINAL: No abdominal or epigastric pain. No nausea, vomiting, or hematemesis; No diarrhea or constipation. No melena or hematochezia.  GENITOURINARY: No dysuria, frequency, hematuria, or incontinence  NEUROLOGICAL: No headaches, memory loss, loss of strength, numbness, or tremors  SKIN: No itching, burning, rashes, or lesions   LYMPH NODES: No enlarged glands  ENDOCRINE: No heat or cold intolerance; No hair loss  MUSCULOSKELETAL: No joint pain or swelling; No muscle, back, or extremity pain  PSYCHIATRIC: No depression, anxiety, mood swings, or difficulty sleeping  HEME/LYMPH: No easy bruising, or bleeding gums  ALLERY AND IMMUNOLOGIC: No hives or eczema    RADIOLOGY & ADDITIONAL TESTS:    Imaging Personally Reviewed:  [ x] YES  [ ] NO, Abd Ultrasound    Consultant(s) Notes Reviewed:  [x ] YES  [ ] NO, Nephrology, GI    PHYSICAL EXAM:  GENERAL: NAD, well-groomed, well-developed  HEAD:  Atraumatic, Normocephalic  EYES: EOMI, PERRLA, conjunctiva and sclera clear  ENMT: No tonsillar erythema, exudates, or enlargement; Moist mucous membranes, Good dentition, No lesions  NECK: Supple  NERVOUS SYSTEM:  Alert & Oriented X3, Good concentration; Motor Strength 5/5 B/L upper and lower extremities; DTRs 2+ intact and symmetric  CHEST/LUNG: Clear to auscultation bilaterally; No rales, rhonchi, wheezing, or rubs  HEART: Regular rate and rhythm; No murmurs, rubs, or gallops  ABDOMEN: Soft, Nontender, Nondistended; Bowel sounds present  EXTREMITIES:  2+ Peripheral Pulses, No clubbing, cyanosis, or edema. +LUE AVF  LYMPH: No lymphadenopathy noted  SKIN: No rashes or lesions    Care Discussed with Consultants/Other Providers [ ] YES  [x ] NO

## 2017-10-13 NOTE — DISCHARGE NOTE ADULT - MEDICATION SUMMARY - MEDICATIONS TO STOP TAKING
I will STOP taking the medications listed below when I get home from the hospital:    sodium polystyrene sulfonate 15 g/60 mL oral suspension  -- 60 milliliter(s) by mouth Monday, Wednesday, and Friday

## 2017-10-13 NOTE — PROGRESS NOTE ADULT - PROBLEM SELECTOR PLAN 2
-Patient with K 4.8 last night, ESRD patient  -Pending AM labs  -Continue with lasix, holding home kayexalate dose given presenting GI complaints  -Renal restricted diet

## 2017-10-13 NOTE — PROGRESS NOTE ADULT - PROBLEM SELECTOR PLAN 1
-Patient reported to hospital with generalized, severe abdominal pain and tenderness, she reports her abdomen was distended. CT A/P showed no evidence of surgical abdomen and peritoneal hematoma was improved  -Now symptoms resolved, abdomen nontender, and soft  -Abdominal ultrasound without evidence of acute disease  -Low suspicion of SBP as patient does not have ascites on imaging, afebrile, and now nontender/nondistended, leukocytosis is chronic from PCV  -GI recommendations appreciated  -Given resolution of symptoms, patient may be able to be discharged today pending follow up of AM labs

## 2017-10-13 NOTE — PROGRESS NOTE ADULT - SUBJECTIVE AND OBJECTIVE BOX
Patient is a 57y old  Female who presents with a chief complaint of abdominal pain (11 Oct 2017 19:46)      SUBJECTIVE / OVERNIGHT EVENTS:    No acute overnight events. Reports significant improvement in abdominal pain. Last got iv Dilaudid at midnight. Reports intermittent nausea but denies vomiting. Tolerating PO diet. Denies diarrhea, melena, hematochezia.       MEDICATIONS  (STANDING):  amLODIPine   Tablet 10 milliGRAM(s) Oral daily  bisacodyl 5 milliGRAM(s) Oral at bedtime  furosemide    Tablet 60 milliGRAM(s) Oral daily  pantoprazole    Tablet 40 milliGRAM(s) Oral before breakfast  polyethylene glycol 3350 17 Gram(s) Oral daily  senna 1 Tablet(s) Oral at bedtime  sodium bicarbonate 1300 milliGRAM(s) Oral three times a day    MEDICATIONS  (PRN):  acetaminophen   Tablet 650 milliGRAM(s) Oral every 6 hours PRN For Temp greater than 38 C (100.4 F)  acetaminophen   Tablet. 650 milliGRAM(s) Oral every 6 hours PRN Mild and Moderate pain  aluminum hydroxide/magnesium hydroxide/simethicone Suspension 30 milliLiter(s) Oral every 6 hours PRN Dyspepsia  HYDROmorphone  Injectable 0.5 milliGRAM(s) IV Push every 6 hours PRN Severe Pain (7 - 10)  ondansetron Injectable 4 milliGRAM(s) IV Push every 6 hours PRN Nausea and/or Vomiting      T(C): 36.6 (10-13 @ 04:58), Max: 36.7 (10-12 @ 21:32)   HR: 78   BP: 123/80   RR: 18   SpO2: 97%    PHYSICAL EXAM:  General: WN/WD NAD  Neurology: A&Ox3, nonfocal, RAHMAN x 4  Eyes: PERRLA/ EOMI, Gross vision intact  ENT/Neck: Neck supple, trachea midline, No JVD, Gross hearing intact  Respiratory: CTA B/L, No wheezing, rales, rhonchi  CV: RRR, S1S2, no murmurs, rubs or gallops  Abdominal: Soft, non distended. Diffuse mild pain to deep palpation, worse in the epigastric region. BS present in all 4 quadrants.   Rectal exam: brown stool, guaiac negative   Extremities: LUE fistula with patent flow. No edema, + peripheral pulses  Skin: No Rashes, Hematoma, Ecchymosis    LABS:                        9.4    12.9  )-----------( 280      ( 13 Oct 2017 07:31 )             27.9      10-13    137  |  98  |  57<H>  ----------------------------<  83  4.5   |  19<L>  |  5.88<H>    Ca    8.5      13 Oct 2017 07:31  Phos  5.6     10-13  Mg     2.1     10-13    TPro  7.7  /  Alb  3.8  /  TBili  1.3<H>  /  DBili  x   /  AST  10  /  ALT  5<L>  /  AlkPhos  142<H>  10-13       CAPILLARY BLOOD GLUCOSE          RADIOLOGY & ADDITIONAL TESTS:    Imaging Personally Reviewed:  Us of the abdomen reviewed. Patent portal vein with hepatopedal flow. The splenic vein is visualized   only in the region of the portal/splenic confluence. The remainder of the   splenic vein is likely chronically thrombosed with multiple   peripancreatic collateral vessels

## 2017-10-13 NOTE — DISCHARGE NOTE ADULT - CARE PROVIDERS DIRECT ADDRESSES
[email or web address]

## 2017-10-13 NOTE — PROGRESS NOTE ADULT - PROBLEM SELECTOR PLAN 2
- likely 2/2 CKD   - resolved   - can c/w lasix (pt on lasix 60mg PO daily at home) - likely 2/2 CKD   - resolved   - can c/w lasix (pt on lasix 60mg PO daily at home). Told her to take and additional dose every other day ( 60 BID EOD)

## 2017-10-13 NOTE — DISCHARGE NOTE ADULT - MEDICATION SUMMARY - MEDICATIONS TO TAKE
I will START or STAY ON the medications listed below when I get home from the hospital:    Dilaudid 2 mg oral tablet  -- 1 tab(s) by mouth every 8 hours, As Needed  -- Indication: For Pain Control    sodium bicarbonate 650 mg oral tablet  -- 2 tab(s) by mouth 3 times a day  -- Indication: For ESRD (end stage renal disease)    Tums 500 mg oral tablet, chewable  -- 1 tab(s) by mouth once a day, As Needed  -- Indication: For Generalized abdominal pain    calcium (as carbonate) 500 mg oral tablet  -- 2 tab(s) by mouth once a day  -- Indication: For Generalized abdominal pain    amLODIPine 10 mg oral tablet  -- 1 tab(s) by mouth once a day  -- Indication: For Hypertension    furosemide 20 mg oral tablet  -- 3 tab(s) by mouth once a day  -- Indication: For Hyperkalemia    Protonix 40 mg oral delayed release tablet  -- 1 tab(s) by mouth once a day (before a meal)  -- Indication: For Generalized abdominal pain I will START or STAY ON the medications listed below when I get home from the hospital:    Dilaudid 2 mg oral tablet  -- 1 tab(s) by mouth every 8 hours, As Needed  -- Indication: For Pain Control    sodium bicarbonate 650 mg oral tablet  -- 2 tab(s) by mouth 3 times a day  -- Indication: For ESRD (end stage renal disease)    Tums 500 mg oral tablet, chewable  -- 1 tab(s) by mouth once a day, As Needed  -- Indication: For Generalized abdominal pain    calcium (as carbonate) 500 mg oral tablet  -- 2 tab(s) by mouth once a day  -- Indication: For Generalized abdominal pain    amLODIPine 10 mg oral tablet  -- 1 tab(s) by mouth once a day  -- Indication: For Hypertension    furosemide 20 mg oral tablet  -- 3 tab(s) by mouth once every other day, 3 tab (s) by mouth BID every other day  -- Indication: For Hyperkalemia    omeprazole 40 mg oral delayed release capsule  -- 1 cap(s) by mouth once a day  -- Indication: For GERD I will START or STAY ON the medications listed below when I get home from the hospital:    Dilaudid 2 mg oral tablet  -- 1 tab(s) by mouth every 8 hours, As Needed  -- Indication: For Pain Control    sodium bicarbonate 650 mg oral tablet  -- 2 tab(s) by mouth 3 times a day  -- Indication: For ESRD (end stage renal disease)    Tums 500 mg oral tablet, chewable  -- 1 tab(s) by mouth once a day, As Needed  -- Indication: For Generalized abdominal pain    calcium (as carbonate) 500 mg oral tablet  -- 2 tab(s) by mouth once a day  -- Indication: For Need for supplementation    amLODIPine 10 mg oral tablet  -- 1 tab(s) by mouth once a day  -- Indication: For Hypertension    furosemide 20 mg oral tablet  -- 3 tab(s) by mouth once every other day, 3 tab (s) by mouth BID every other day  -- Indication: For Hyperkalemia    omeprazole 40 mg oral delayed release capsule  -- 1 cap(s) by mouth once a day  -- Indication: For GERD

## 2017-10-13 NOTE — PROGRESS NOTE ADULT - SUBJECTIVE AND OBJECTIVE BOX
[facility] DIVISION OF KIDNEY DISEASES AND HYPERTENSION -- FOLLOW UP NOTE  --------------------------------------------------------------------------------  Chief Complaint: abdominal pain, CKD V    24 hour events/subjective: Feeling well. Able to tolerate diet, but still says she still feels like "something is stuck" when she swallows, but it has improved from yesterday. No longer endorses abdominal pain.        PAST HISTORY  --------------------------------------------------------------------------------  No significant changes to PMH, PSH, FHx, SHx, unless otherwise noted    ALLERGIES & MEDICATIONS  --------------------------------------------------------------------------------  Allergies    No Known Allergies    Intolerances      Standing Inpatient Medications  amLODIPine   Tablet 10 milliGRAM(s) Oral daily  bisacodyl 5 milliGRAM(s) Oral at bedtime  furosemide    Tablet 60 milliGRAM(s) Oral daily  pantoprazole    Tablet 40 milliGRAM(s) Oral before breakfast  polyethylene glycol 3350 17 Gram(s) Oral daily  senna 1 Tablet(s) Oral at bedtime  sodium bicarbonate 1300 milliGRAM(s) Oral three times a day    PRN Inpatient Medications  acetaminophen   Tablet 650 milliGRAM(s) Oral every 6 hours PRN  acetaminophen   Tablet. 650 milliGRAM(s) Oral every 6 hours PRN  aluminum hydroxide/magnesium hydroxide/simethicone Suspension 30 milliLiter(s) Oral every 6 hours PRN  HYDROmorphone  Injectable 0.5 milliGRAM(s) IV Push every 6 hours PRN  ondansetron Injectable 4 milliGRAM(s) IV Push every 6 hours PRN      REVIEW OF SYSTEMS  --------------------------------------------------------------------------------  Gen: no fatigue, fevers/chills, weakness  Skin: No rashes  Respiratory: No dyspnea, cough, wheezing, hemoptysis  CV: No chest pain, PND, orthopnea  GI: No abdominal pain, diarrhea, constipation, nausea, vomiting  : No dysuria, hematuria  MSK: No joint pain/swelling; no edema  Neuro: no confusion    VITALS/PHYSICAL EXAM  --------------------------------------------------------------------------------  T(C): 36.6 (10-13-17 @ 04:58), Max: 36.7 (10-12-17 @ 21:32)  HR: 78 (10-13-17 @ 04:58) (78 - 88)  BP: 123/80 (10-13-17 @ 04:58) (115/77 - 123/80)  RR: 18 (10-13-17 @ 04:58) (18 - 18)  SpO2: 97% (10-13-17 @ 04:58) (97% - 98%)  Wt(kg): --  Height (cm): 157.48 (10-11-17 @ 19:52)  Weight (kg): 51.8 (10-11-17 @ 19:52)  BMI (kg/m2): 20.9 (10-11-17 @ 19:52)  BSA (m2): 1.51 (10-11-17 @ 19:52)      10-12-17 @ 07:01  -  10-13-17 @ 07:00  --------------------------------------------------------  IN: 0 mL / OUT: 201 mL / NET: -201 mL      Physical Exam:  	Gen: NAD, well-appearing female  	HEENT: supple neck; moist oral mucosa  	Pulm: CTA B/L  	CV: RRR, S1S2; no rub  	Back: No spinal or CVA tenderness; no sacral edema  	Abd: +BS, soft, nontender/nondistended; healed scar from prior PD catheter  	: No suprapubic tenderness  	UE: Warm, FROM, no clubbing, intact strength; no edema  	LE: Warm, FROM, no clubbing, intact strength; no edema  	Neuro: No focal deficits  	Psych: Normal affect and mood  	Skin: Warm, without rashes  	Access: MARIETTA NIETO +thrill    LABS/STUDIES  --------------------------------------------------------------------------------              9.4    12.9  >-----------<  280      [10-13-17 @ 07:31]              27.9     137  |  98  |  57  ----------------------------<  83      [10-13-17 @ 07:31]  4.5   |  19  |  5.88        Ca     8.5     [10-13-17 @ 07:31]      Mg     2.1     [10-13-17 @ 07:31]      Phos  5.6     [10-13-17 @ 07:31]    TPro  7.7  /  Alb  3.8  /  TBili  1.3  /  DBili  x   /  AST  10  /  ALT  5   /  AlkPhos  142  [10-13-17 @ 07:31]    PT/INR: PT 12.8 , INR 1.18       [10-11-17 @ 14:19]  PTT: 33.8       [10-11-17 @ 14:19]      Creatinine Trend:  SCr 5.88 [10-13 @ 07:31]  SCr 5.80 [10-12 @ 18:20]  SCr 5.38 [10-12 @ 06:53]  SCr 5.21 [10-11 @ 20:28]  SCr 5.40 [10-11 @ 14:19]    Urinalysis - [10-11-17 @ 15:16]      Color Colorless / Appearance Clear / SG 1.008 / pH 7.0      Gluc Negative / Ketone Negative  / Bili Negative / Urobili Negative       Blood Negative / Protein 100 / Leuk Est Trace / Nitrite Negative      RBC  / WBC 10-25 / Hyaline  / Gran  / Sq Epi  / Non Sq Epi  / Bacteria       HbA1c 5.7      [01-18-17 @ 07:32]  TSH 1.15      [01-18-17 @ 07:32]

## 2017-10-13 NOTE — DISCHARGE NOTE ADULT - PLAN OF CARE
Continue follow up You presented to the hospital with generalized abdominal pain. You had a CT scan and an ultrasound performed which showed no evidence of acute disease. 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 Your symptoms have resolved. Please continue to take Dilaudid for pain control and Protonix. A prescription for Protonix was sent to [facility]. If you experience severe abdominal pain again, please call your primary care doctor. Please make an appointment to follow up with your doctor in 1-2 weeks after discharge. Continue follow up with nephrology You have a history of renal failure. Your potassium was elevated when you presented to the hospital. Please continue to follow up with your nephrologist, Dr. De León. You will need to make an appointment to be seen next week to have your potassium level checked. Please stop taking your Kayexalate but continue to take your Lasix 60mg daily. Please follow a renal diet and avoid foods high in potassium and phosphorus. Continue treatment You have a history of hypertension, which was stable during this admission. Please continue to take your amlodipine and follow up with your primary doctor as above. You have a history of renal failure. Your potassium was elevated when you presented to the hospital. Please continue to follow up with your nephrologist, Dr. De León. You will need to make an appointment to be seen next week to have your potassium level checked. Please stop taking your Kayexalate. Please take Lasix 60mg once daily and take Lasix 60mg twice a day every other day. For example, if you take Lasix once on Saturday, please take it once in the morning and once at night on Sunday. Please follow a renal diet and avoid foods high in potassium and phosphorus. You will see Dr. Azucena De León on Wednesday, 10/18/17. Her office will call you for an appointment time.

## 2017-10-13 NOTE — PROGRESS NOTE ADULT - ASSESSMENT
57 year old Indonesian speaking female history of ESRD s/p PD catheter placement 8/9 with removal of catheter 2/2 hemoperitoneum 9/18 (last session 3 weeks ago) , HTN, polycythemia vera c/b splenic infarcts, PUD presents with abdominal pain  Impression:   1. Diffuse abdominal pain worse in the epigastrium; improved  2. Nausea, vomiting, anorexia  3. H/o hemoperitoneum with resolving LUQ hematoma  4. Polycythemia vera c/b splenic infarcts  5. Chronic splenic thrombosis  6. ESRD awaiting fistula maturation  7. PUD    DDx: PUD, peritonitis,  splenic infarcts, splenic thrombosis, gastroenteritis   Plan   1. White count downtrending, afebrile overnight. US of the abdomen consistent with chronic thrombosis of the splenic vein. No evidence of portal vein thrombosis. Suspect pain likely 2/2 to splenic thrombosis/infarcts.  2. Continue with PPI  3. Continue with pain control, renal diet as tolerated

## 2017-10-13 NOTE — DISCHARGE NOTE ADULT - MEDICATION SUMMARY - MEDICATIONS TO CHANGE
I will SWITCH the dose or number of times a day I take the medications listed below when I get home from the hospital:  None I will SWITCH the dose or number of times a day I take the medications listed below when I get home from the hospital:    furosemide 20 mg oral tablet  -- 3 tab(s) by mouth once a day

## 2017-10-13 NOTE — PROGRESS NOTE ADULT - ATTENDING COMMENTS
stage 5 CKD:   mild s/o uremia present but no acute indication to start dialysis  Maintain on lasix/sodium bicarbonate  Initiate HD when AVF is mature    Hyperkalemia:   Low K diet   Hold Kayexalate for now  Maintain on lasix     Abdominal Pain:   Imaging not suggestive of any recurrent bleeding  Clinically much improved  Maintain on PPI
stage 5 CKD:   mild s/o uremia present but no acute indication to start dialysis  Maintain on lasix, told her to increase to BID ( 60 mg) EOD  Maintain on sodium bicarbonate TID  Initiate HD when AVF is mature  She has a follow up appointment with Dr De León on 10/18 at 10.30 am    Hyperkalemia:   Low K diet   Hold Kayexalate for now  Maintain on lasix, Increase to BID every other day      Abdominal Pain:   Imaging not suggestive of any recurrent bleeding  Clinically much improved  Maintain on PPI
Patient to be discharged home today.  F/U with renal early next week.   Abdominal discomfort completely resolved.       More than 45 minutes spent coordinating discharge.

## 2017-10-13 NOTE — DISCHARGE NOTE ADULT - CARE PLAN
Principal Discharge DX:	Generalized abdominal pain  Goal:	Continue follow up  Instructions for follow-up, activity and diet:	You presented to the hospital with generalized abdominal pain. You had a CT scan and an ultrasound performed which showed no evidence of acute disease. 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 Your symptoms have resolved. Please continue to take Dilaudid for pain control and Protonix. A prescription for Protonix was sent to [facility]. If you experience severe abdominal pain again, please call your primary care doctor. Please make an appointment to follow up with your doctor in 1-2 weeks after discharge.  Secondary Diagnosis:	ESRD (end stage renal disease)  Goal:	Continue follow up with nephrology  Instructions for follow-up, activity and diet:	You have a history of renal failure. Your potassium was elevated when you presented to the hospital. Please continue to follow up with your nephrologist, Dr. De León. You will need to make an appointment to be seen next week to have your potassium level checked. Please stop taking your Kayexalate but continue to take your Lasix 60mg daily. Please follow a renal diet and avoid foods high in potassium and phosphorus.  Secondary Diagnosis:	Hypertension  Goal:	Continue treatment  Instructions for follow-up, activity and diet:	You have a history of hypertension, which was stable during this admission. Please continue to take your amlodipine and follow up with your primary doctor as above. Principal Discharge DX:	Generalized abdominal pain  Goal:	Continue follow up  Instructions for follow-up, activity and diet:	You presented to the hospital with generalized abdominal pain. You had a CT scan and an ultrasound performed which showed no evidence of acute disease. 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 Your symptoms have resolved. Please continue to take Dilaudid for pain control and Protonix. A prescription for Protonix was sent to [facility]. If you experience severe abdominal pain again, please call your primary care doctor. Please make an appointment to follow up with your doctor in 1-2 weeks after discharge.  Secondary Diagnosis:	ESRD (end stage renal disease)  Goal:	Continue follow up with nephrology  Instructions for follow-up, activity and diet:	You have a history of renal failure. Your potassium was elevated when you presented to the hospital. Please continue to follow up with your nephrologist, Dr. De León. You will need to make an appointment to be seen next week to have your potassium level checked. Please stop taking your Kayexalate. Please take Lasix 60mg once daily and take Lasix 60mg twice a day every other day. For example, if you take Lasix once on Saturday, please take it once in the morning and once at night on Sunday. Please follow a renal diet and avoid foods high in potassium and phosphorus. You will see Dr. Azucena De León on Wednesday, 10/18/17. Her office will call you for an appointment time.  Secondary Diagnosis:	Hypertension  Goal:	Continue treatment  Instructions for follow-up, activity and diet:	You have a history of hypertension, which was stable during this admission. Please continue to take your amlodipine and follow up with your primary doctor as above.

## 2017-10-13 NOTE — DISCHARGE NOTE ADULT - PATIENT PORTAL LINK FT
“You can access the Follow[employer] Patient Portal, offered by [facility], by registering with the following website: [email or web address]”

## 2017-10-18 ENCOUNTER — OUTPATIENT (OUTPATIENT)
Dept: OUTPATIENT SERVICES | Facility: HOSPITAL | Age: 57
LOS: 1 days | End: 2017-10-18
Payer: SELF-PAY

## 2017-10-18 ENCOUNTER — APPOINTMENT (OUTPATIENT)
Dept: NEPHROLOGY | Facility: CLINIC | Age: 57
End: 2017-10-18
Payer: MEDICAID

## 2017-10-18 ENCOUNTER — LABORATORY RESULT (OUTPATIENT)
Age: 57
End: 2017-10-18

## 2017-10-18 VITALS
HEART RATE: 90 BPM | DIASTOLIC BLOOD PRESSURE: 78 MMHG | SYSTOLIC BLOOD PRESSURE: 113 MMHG | OXYGEN SATURATION: 98 % | HEIGHT: 60 IN | WEIGHT: 114 LBS | BODY MASS INDEX: 22.38 KG/M2

## 2017-10-18 DIAGNOSIS — T85.898A OTHER SPECIFIED COMPLICATION OF OTHER INTERNAL PROSTHETIC DEVICES, IMPLANTS AND GRAFTS, INITIAL ENCOUNTER: Chronic | ICD-10-CM

## 2017-10-18 DIAGNOSIS — I77.0 ARTERIOVENOUS FISTULA, ACQUIRED: Chronic | ICD-10-CM

## 2017-10-18 DIAGNOSIS — Z99.2 DEPENDENCE ON RENAL DIALYSIS: Chronic | ICD-10-CM

## 2017-10-18 PROCEDURE — G0463: CPT

## 2017-10-18 PROCEDURE — 99214 OFFICE O/P EST MOD 30 MIN: CPT | Mod: GC

## 2017-10-19 DIAGNOSIS — E87.5 HYPERKALEMIA: ICD-10-CM

## 2017-10-19 DIAGNOSIS — N18.5 CHRONIC KIDNEY DISEASE, STAGE 5: ICD-10-CM

## 2017-10-19 DIAGNOSIS — I10 ESSENTIAL (PRIMARY) HYPERTENSION: ICD-10-CM

## 2017-10-20 LAB
ADJUSTED MITOGEN: 0.03 IU/ML
ADJUSTED TB AG: -0.01 IU/ML
ALBUMIN SERPL ELPH-MCNC: 4.3 G/DL
ANION GAP SERPL CALC-SCNC: 18 MMOL/L
BASOPHILS # BLD AUTO: 0 K/UL
BASOPHILS NFR BLD AUTO: 0 %
BUN SERPL-MCNC: 52 MG/DL
CALCIUM SERPL-MCNC: 8.6 MG/DL
CHLORIDE SERPL-SCNC: 103 MMOL/L
CO2 SERPL-SCNC: 20 MMOL/L
CREAT SERPL-MCNC: 5.7 MG/DL
EOSINOPHIL # BLD AUTO: 0.5 K/UL
EOSINOPHIL NFR BLD AUTO: 3.6 %
GLUCOSE SERPL-MCNC: 80 MG/DL
HBV CORE IGG+IGM SER QL: NONREACTIVE
HBV CORE IGM SER QL: NONREACTIVE
HBV SURFACE AB SER QL: NONREACTIVE
HBV SURFACE AB SERPL IA-ACNC: <3 MIU/ML
HBV SURFACE AG SER QL: NONREACTIVE
HCT VFR BLD CALC: 35.1 %
HGB BLD-MCNC: 10.3 G/DL
LYMPHOCYTES # BLD AUTO: 1.12 K/UL
LYMPHOCYTES NFR BLD AUTO: 8 %
M TB IFN-G BLD-IMP: ABNORMAL
MAN DIFF?: NORMAL
MCHC RBC-ENTMCNC: 21.4 PG
MCHC RBC-ENTMCNC: 29.3 GM/DL
MCV RBC AUTO: 73 FL
MONOCYTES # BLD AUTO: 0.25 K/UL
MONOCYTES NFR BLD AUTO: 1.8 %
NEUTROPHILS # BLD AUTO: 12.12 K/UL
NEUTROPHILS NFR BLD AUTO: 86.6 %
PHOSPHATE SERPL-MCNC: 5.8 MG/DL
PLATELET # BLD AUTO: 422 K/UL
POTASSIUM SERPL-SCNC: 5.5 MMOL/L
QUANTIFERON GOLD NIL: 0.04 IU/ML
RBC # BLD: 4.81 M/UL
RBC # FLD: 17.9 %
SODIUM SERPL-SCNC: 141 MMOL/L
WBC # FLD AUTO: 14 K/UL

## 2017-10-22 ENCOUNTER — FORM ENCOUNTER (OUTPATIENT)
Age: 57
End: 2017-10-22

## 2017-10-23 ENCOUNTER — LABORATORY RESULT (OUTPATIENT)
Age: 57
End: 2017-10-23

## 2017-10-23 ENCOUNTER — APPOINTMENT (OUTPATIENT)
Age: 57
End: 2017-10-23
Payer: MEDICAID

## 2017-10-23 PROCEDURE — 36902Z: CUSTOM

## 2017-10-26 LAB
ALBUMIN SERPL ELPH-MCNC: 4.1 G/DL
ANION GAP SERPL CALC-SCNC: 20 MMOL/L
BASOPHILS # BLD AUTO: 0.1 K/UL
BASOPHILS NFR BLD AUTO: 0.7 %
BUN SERPL-MCNC: 56 MG/DL
CALCIUM SERPL-MCNC: 8.8 MG/DL
CHLORIDE SERPL-SCNC: 100 MMOL/L
CO2 SERPL-SCNC: 20 MMOL/L
CREAT SERPL-MCNC: 5.69 MG/DL
EOSINOPHIL # BLD AUTO: 0.43 K/UL
EOSINOPHIL NFR BLD AUTO: 2.9 %
GLUCOSE SERPL-MCNC: 135 MG/DL
HCT VFR BLD CALC: 33.9 %
HGB BLD-MCNC: 10 G/DL
IMM GRANULOCYTES NFR BLD AUTO: 0.3 %
LYMPHOCYTES # BLD AUTO: 1.28 K/UL
LYMPHOCYTES NFR BLD AUTO: 8.5 %
MAN DIFF?: NORMAL
MCHC RBC-ENTMCNC: 21.1 PG
MCHC RBC-ENTMCNC: 29.5 GM/DL
MCV RBC AUTO: 71.5 FL
MONOCYTES # BLD AUTO: 0.58 K/UL
MONOCYTES NFR BLD AUTO: 3.9 %
NEUTROPHILS # BLD AUTO: 12.55 K/UL
NEUTROPHILS NFR BLD AUTO: 83.7 %
PHOSPHATE SERPL-MCNC: 5.2 MG/DL
PLATELET # BLD AUTO: 415 K/UL
POTASSIUM SERPL-SCNC: 4.6 MMOL/L
RBC # BLD: 4.74 M/UL
RBC # FLD: 17.9 %
SODIUM SERPL-SCNC: 140 MMOL/L
WBC # FLD AUTO: 14.98 K/UL

## 2017-10-29 NOTE — PROGRESS NOTE ADULT - PROBLEM SELECTOR PROBLEM 5
DM (diabetes mellitus)    HLD (hyperlipidemia)    HTN (hypertension)    Hypothyroidism    TIA (transient ischemic attack) Anemia due to blood loss

## 2017-11-02 LAB
ALBUMIN SERPL ELPH-MCNC: 4.4 G/DL
ANION GAP SERPL CALC-SCNC: 20 MMOL/L
BASOPHILS # BLD AUTO: 0.05 K/UL
BASOPHILS NFR BLD AUTO: 0.3 %
BUN SERPL-MCNC: 58 MG/DL
CALCIUM SERPL-MCNC: 8.4 MG/DL
CHLORIDE SERPL-SCNC: 104 MMOL/L
CO2 SERPL-SCNC: 18 MMOL/L
CREAT SERPL-MCNC: 5.48 MG/DL
EOSINOPHIL # BLD AUTO: 0.36 K/UL
EOSINOPHIL NFR BLD AUTO: 2.4 %
GLUCOSE SERPL-MCNC: 104 MG/DL
HCT VFR BLD CALC: 34.2 %
HGB BLD-MCNC: 9.9 G/DL
IMM GRANULOCYTES NFR BLD AUTO: 0.4 %
LYMPHOCYTES # BLD AUTO: 1.05 K/UL
LYMPHOCYTES NFR BLD AUTO: 7 %
MAN DIFF?: NORMAL
MCHC RBC-ENTMCNC: 21 PG
MCHC RBC-ENTMCNC: 28.9 GM/DL
MCV RBC AUTO: 72.6 FL
MONOCYTES # BLD AUTO: 0.62 K/UL
MONOCYTES NFR BLD AUTO: 4.1 %
NEUTROPHILS # BLD AUTO: 12.92 K/UL
NEUTROPHILS NFR BLD AUTO: 85.8 %
PHOSPHATE SERPL-MCNC: 5.4 MG/DL
PLATELET # BLD AUTO: 419 K/UL
POTASSIUM SERPL-SCNC: 4.8 MMOL/L
RBC # BLD: 4.71 M/UL
RBC # FLD: 17.6 %
SODIUM SERPL-SCNC: 142 MMOL/L
WBC # FLD AUTO: 15.06 K/UL

## 2017-11-08 ENCOUNTER — APPOINTMENT (OUTPATIENT)
Dept: NEPHROLOGY | Facility: CLINIC | Age: 57
End: 2017-11-08
Payer: MEDICAID

## 2017-11-08 ENCOUNTER — LABORATORY RESULT (OUTPATIENT)
Age: 57
End: 2017-11-08

## 2017-11-08 ENCOUNTER — FORM ENCOUNTER (OUTPATIENT)
Age: 57
End: 2017-11-08

## 2017-11-08 VITALS — SYSTOLIC BLOOD PRESSURE: 138 MMHG | DIASTOLIC BLOOD PRESSURE: 74 MMHG

## 2017-11-08 VITALS
SYSTOLIC BLOOD PRESSURE: 154 MMHG | BODY MASS INDEX: 22.51 KG/M2 | HEART RATE: 84 BPM | HEIGHT: 60 IN | DIASTOLIC BLOOD PRESSURE: 92 MMHG | WEIGHT: 114.64 LBS | OXYGEN SATURATION: 100 %

## 2017-11-08 PROCEDURE — 99215 OFFICE O/P EST HI 40 MIN: CPT

## 2017-11-08 RX ORDER — HYDROMORPHONE HYDROCHLORIDE 2 MG/1
2 TABLET ORAL EVERY 8 HOURS
Refills: 0 | Status: DISCONTINUED | COMMUNITY
Start: 2017-09-22 | End: 2017-11-08

## 2017-11-08 RX ORDER — OXYCODONE 5 MG/1
5 TABLET ORAL
Qty: 15 | Refills: 0 | Status: DISCONTINUED | COMMUNITY
Start: 2017-08-08 | End: 2017-11-08

## 2017-11-08 RX ORDER — SIMETHICONE 80 MG/1
80 TABLET, CHEWABLE ORAL
Refills: 0 | Status: DISCONTINUED | COMMUNITY
Start: 2017-09-22 | End: 2017-11-08

## 2017-11-09 ENCOUNTER — APPOINTMENT (OUTPATIENT)
Age: 57
End: 2017-11-09
Payer: MEDICAID

## 2017-11-09 ENCOUNTER — APPOINTMENT (OUTPATIENT)
Dept: VASCULAR SURGERY | Facility: CLINIC | Age: 57
End: 2017-11-09
Payer: MEDICAID

## 2017-11-09 VITALS
HEART RATE: 81 BPM | TEMPERATURE: 98 F | HEIGHT: 60 IN | DIASTOLIC BLOOD PRESSURE: 89 MMHG | SYSTOLIC BLOOD PRESSURE: 131 MMHG | WEIGHT: 115 LBS | BODY MASS INDEX: 22.58 KG/M2

## 2017-11-09 LAB
ALBUMIN SERPL ELPH-MCNC: 4.9 G/DL
ANION GAP SERPL CALC-SCNC: 21 MMOL/L
BASOPHILS # BLD AUTO: 0.06 K/UL
BASOPHILS NFR BLD AUTO: 0.4 %
BUN SERPL-MCNC: 78 MG/DL
CALCIUM SERPL-MCNC: 8.4 MG/DL
CHLORIDE SERPL-SCNC: 98 MMOL/L
CO2 SERPL-SCNC: 20 MMOL/L
CREAT SERPL-MCNC: 5.95 MG/DL
EOSINOPHIL # BLD AUTO: 0.41 K/UL
EOSINOPHIL NFR BLD AUTO: 2.7 %
GLUCOSE SERPL-MCNC: 100 MG/DL
HCT VFR BLD CALC: 36 %
HGB BLD-MCNC: 10.6 G/DL
IMM GRANULOCYTES NFR BLD AUTO: 0.4 %
LYMPHOCYTES # BLD AUTO: 1.43 K/UL
LYMPHOCYTES NFR BLD AUTO: 9.3 %
MAN DIFF?: NORMAL
MCHC RBC-ENTMCNC: 20.6 PG
MCHC RBC-ENTMCNC: 29.4 GM/DL
MCV RBC AUTO: 70 FL
MONOCYTES # BLD AUTO: 0.75 K/UL
MONOCYTES NFR BLD AUTO: 4.9 %
NEUTROPHILS # BLD AUTO: 12.71 K/UL
NEUTROPHILS NFR BLD AUTO: 82.3 %
PHOSPHATE SERPL-MCNC: 6.8 MG/DL
PLATELET # BLD AUTO: 486 K/UL
POTASSIUM SERPL-SCNC: 5.6 MMOL/L
RBC # BLD: 5.14 M/UL
RBC # FLD: 17.6 %
SODIUM SERPL-SCNC: 139 MMOL/L
WBC # FLD AUTO: 15.42 K/UL

## 2017-11-09 PROCEDURE — 93990 DOPPLER FLOW TESTING: CPT

## 2017-11-09 PROCEDURE — 36903Z: CUSTOM | Mod: 78

## 2017-11-09 PROCEDURE — 36903Z: CUSTOM | Mod: 58

## 2017-11-09 PROCEDURE — 99024 POSTOP FOLLOW-UP VISIT: CPT

## 2017-11-10 ENCOUNTER — OUTPATIENT (OUTPATIENT)
Dept: OUTPATIENT SERVICES | Facility: HOSPITAL | Age: 57
LOS: 1 days | End: 2017-11-10
Payer: SELF-PAY

## 2017-11-10 ENCOUNTER — APPOINTMENT (OUTPATIENT)
Dept: INTERNAL MEDICINE | Facility: CLINIC | Age: 57
End: 2017-11-10
Payer: COMMERCIAL

## 2017-11-10 ENCOUNTER — MED ADMIN CHARGE (OUTPATIENT)
Age: 57
End: 2017-11-10

## 2017-11-10 VITALS
WEIGHT: 111 LBS | OXYGEN SATURATION: 98 % | HEART RATE: 85 BPM | DIASTOLIC BLOOD PRESSURE: 70 MMHG | BODY MASS INDEX: 21.68 KG/M2 | SYSTOLIC BLOOD PRESSURE: 122 MMHG

## 2017-11-10 DIAGNOSIS — Z99.2 DEPENDENCE ON RENAL DIALYSIS: Chronic | ICD-10-CM

## 2017-11-10 DIAGNOSIS — T85.898A OTHER SPECIFIED COMPLICATION OF OTHER INTERNAL PROSTHETIC DEVICES, IMPLANTS AND GRAFTS, INITIAL ENCOUNTER: Chronic | ICD-10-CM

## 2017-11-10 DIAGNOSIS — I77.0 ARTERIOVENOUS FISTULA, ACQUIRED: Chronic | ICD-10-CM

## 2017-11-10 DIAGNOSIS — I10 ESSENTIAL (PRIMARY) HYPERTENSION: ICD-10-CM

## 2017-11-10 PROCEDURE — 90670 PCV13 VACCINE IM: CPT

## 2017-11-10 PROCEDURE — G0463: CPT

## 2017-11-10 PROCEDURE — 99213 OFFICE O/P EST LOW 20 MIN: CPT | Mod: GE

## 2017-11-10 PROCEDURE — G0009: CPT

## 2017-11-10 RX ORDER — CALCIUM CARBONATE 500 MG/1
500 TABLET, CHEWABLE ORAL 3 TIMES DAILY
Refills: 0 | Status: COMPLETED | COMMUNITY
Start: 2017-09-22 | End: 2017-11-10

## 2017-11-20 ENCOUNTER — LABORATORY RESULT (OUTPATIENT)
Age: 57
End: 2017-11-20

## 2017-11-20 DIAGNOSIS — E87.5 HYPERKALEMIA: ICD-10-CM

## 2017-11-20 DIAGNOSIS — Z23 ENCOUNTER FOR IMMUNIZATION: ICD-10-CM

## 2017-11-21 LAB
ALBUMIN SERPL ELPH-MCNC: 3.8 G/DL
ANION GAP SERPL CALC-SCNC: 18 MMOL/L
BASOPHILS # BLD AUTO: 0.05 K/UL
BASOPHILS NFR BLD AUTO: 0.4 %
BUN SERPL-MCNC: 53 MG/DL
CALCIUM SERPL-MCNC: 8.1 MG/DL
CHLORIDE SERPL-SCNC: 103 MMOL/L
CO2 SERPL-SCNC: 20 MMOL/L
CREAT SERPL-MCNC: 5.19 MG/DL
EOSINOPHIL # BLD AUTO: 0.31 K/UL
EOSINOPHIL NFR BLD AUTO: 2.4
GLUCOSE SERPL-MCNC: 134 MG/DL
HCT VFR BLD CALC: 33 %
HGB BLD-MCNC: 9.4 G/DL
IMM GRANULOCYTES NFR BLD AUTO: 0.5 %
LYMPHOCYTES # BLD AUTO: 0.99 K/UL
LYMPHOCYTES NFR BLD AUTO: 7.7 %
MAN DIFF?: NORMAL
MCHC RBC-ENTMCNC: 20.3 PG
MCHC RBC-ENTMCNC: 28.5 GM/DL
MCV RBC AUTO: 71.1 FL
MONOCYTES # BLD AUTO: 0.45 K/UL
MONOCYTES NFR BLD AUTO: 3.5 %
NEUTROPHILS # BLD AUTO: 11.06 K/UL
NEUTROPHILS NFR BLD AUTO: 85.5 %
PHOSPHATE SERPL-MCNC: 6.1 MG/DL
PLATELET # BLD AUTO: 378 K/UL
POTASSIUM SERPL-SCNC: 4.3 MMOL/L
RBC # BLD: 4.64 M/UL
RBC # FLD: 17.8 %
SODIUM SERPL-SCNC: 141 MMOL/L
WBC # FLD AUTO: 12.93 K/UL

## 2017-11-27 ENCOUNTER — LABORATORY RESULT (OUTPATIENT)
Age: 57
End: 2017-11-27

## 2017-11-28 ENCOUNTER — FORM ENCOUNTER (OUTPATIENT)
Age: 57
End: 2017-11-28

## 2017-11-29 ENCOUNTER — OUTPATIENT (OUTPATIENT)
Dept: OUTPATIENT SERVICES | Facility: HOSPITAL | Age: 57
LOS: 1 days | End: 2017-11-29
Payer: MEDICAID

## 2017-11-29 ENCOUNTER — APPOINTMENT (OUTPATIENT)
Dept: MAMMOGRAPHY | Facility: IMAGING CENTER | Age: 57
End: 2017-11-29
Payer: MEDICAID

## 2017-11-29 DIAGNOSIS — Z00.00 ENCOUNTER FOR GENERAL ADULT MEDICAL EXAMINATION WITHOUT ABNORMAL FINDINGS: ICD-10-CM

## 2017-11-29 DIAGNOSIS — I77.0 ARTERIOVENOUS FISTULA, ACQUIRED: Chronic | ICD-10-CM

## 2017-11-29 DIAGNOSIS — T85.898A OTHER SPECIFIED COMPLICATION OF OTHER INTERNAL PROSTHETIC DEVICES, IMPLANTS AND GRAFTS, INITIAL ENCOUNTER: Chronic | ICD-10-CM

## 2017-11-29 DIAGNOSIS — Z99.2 DEPENDENCE ON RENAL DIALYSIS: Chronic | ICD-10-CM

## 2017-11-29 LAB
ALBUMIN SERPL ELPH-MCNC: 4.1 G/DL
ANION GAP SERPL CALC-SCNC: 17 MMOL/L
BASOPHILS # BLD AUTO: 0.24 K/UL
BASOPHILS NFR BLD AUTO: 1.8 %
BUN SERPL-MCNC: 59 MG/DL
CALCIUM SERPL-MCNC: 8.1 MG/DL
CHLORIDE SERPL-SCNC: 104 MMOL/L
CO2 SERPL-SCNC: 16 MMOL/L
CREAT SERPL-MCNC: 5.89 MG/DL
EOSINOPHIL # BLD AUTO: 0.35 K/UL
EOSINOPHIL NFR BLD AUTO: 2.7
GLUCOSE SERPL-MCNC: 150 MG/DL
HCT VFR BLD CALC: 31.7 %
HGB BLD-MCNC: 9.3 G/DL
LYMPHOCYTES # BLD AUTO: 1.16 K/UL
LYMPHOCYTES NFR BLD AUTO: 8.9 %
MAN DIFF?: NORMAL
MCHC RBC-ENTMCNC: 20.5 PG
MCHC RBC-ENTMCNC: 29.3 GM/DL
MCV RBC AUTO: 70 FL
MONOCYTES # BLD AUTO: 0 K/UL
MONOCYTES NFR BLD AUTO: 0 %
NEUTROPHILS # BLD AUTO: 11.21 K/UL
NEUTROPHILS NFR BLD AUTO: 80.3 %
PHOSPHATE SERPL-MCNC: 5.3 MG/DL
PLATELET # BLD AUTO: 384 K/UL
POTASSIUM SERPL-SCNC: 5 MMOL/L
RBC # BLD: 4.53 M/UL
RBC # FLD: 19 %
SODIUM SERPL-SCNC: 137 MMOL/L
WBC # FLD AUTO: 13.08 K/UL

## 2017-11-29 PROCEDURE — 77063 BREAST TOMOSYNTHESIS BI: CPT | Mod: 26

## 2017-11-29 PROCEDURE — 77063 BREAST TOMOSYNTHESIS BI: CPT

## 2017-11-29 PROCEDURE — 77067 SCR MAMMO BI INCL CAD: CPT

## 2017-11-29 PROCEDURE — G0202: CPT | Mod: 26

## 2017-12-05 LAB
ALBUMIN SERPL ELPH-MCNC: 4.1 G/DL
ANION GAP SERPL CALC-SCNC: 17 MMOL/L
BUN SERPL-MCNC: 58 MG/DL
CALCIUM SERPL-MCNC: 8.7 MG/DL
CHLORIDE SERPL-SCNC: 103 MMOL/L
CO2 SERPL-SCNC: 18 MMOL/L
CREAT SERPL-MCNC: 5.82 MG/DL
GLUCOSE SERPL-MCNC: 102 MG/DL
PHOSPHATE SERPL-MCNC: 5.7 MG/DL
POTASSIUM SERPL-SCNC: 5.2 MMOL/L
SODIUM SERPL-SCNC: 138 MMOL/L

## 2017-12-07 ENCOUNTER — APPOINTMENT (OUTPATIENT)
Dept: VASCULAR SURGERY | Facility: CLINIC | Age: 57
End: 2017-12-07
Payer: MEDICAID

## 2017-12-07 PROCEDURE — 99024 POSTOP FOLLOW-UP VISIT: CPT

## 2017-12-07 PROCEDURE — 93990 DOPPLER FLOW TESTING: CPT

## 2017-12-14 LAB
ADJUSTED MITOGEN: 4.66 IU/ML
ADJUSTED TB AG: 0.05 IU/ML
ALBUMIN SERPL ELPH-MCNC: 4.1 G/DL
ANION GAP SERPL CALC-SCNC: 18 MMOL/L
BUN SERPL-MCNC: 60 MG/DL
CALCIUM SERPL-MCNC: 8.7 MG/DL
CHLORIDE SERPL-SCNC: 104 MMOL/L
CO2 SERPL-SCNC: 16 MMOL/L
CREAT SERPL-MCNC: 5.63 MG/DL
GLUCOSE SERPL-MCNC: 79 MG/DL
HBV CORE IGG+IGM SER QL: NONREACTIVE
HBV CORE IGM SER QL: NONREACTIVE
HBV SURFACE AB SER QL: NONREACTIVE
HBV SURFACE AB SERPL IA-ACNC: <3 MIU/ML
HBV SURFACE AG SER QL: NONREACTIVE
HCV AB SER QL: NONREACTIVE
HCV S/CO RATIO: 0.21 S/CO
M TB IFN-G BLD-IMP: NEGATIVE
PHOSPHATE SERPL-MCNC: 6.4 MG/DL
POTASSIUM SERPL-SCNC: 6 MMOL/L
QUANTIFERON GOLD NIL: 0.02 IU/ML
SODIUM SERPL-SCNC: 138 MMOL/L

## 2017-12-19 LAB
ALBUMIN SERPL ELPH-MCNC: 4.5 G/DL
ANION GAP SERPL CALC-SCNC: 18 MMOL/L
BUN SERPL-MCNC: 57 MG/DL
CALCIUM SERPL-MCNC: 8.5 MG/DL
CHLORIDE SERPL-SCNC: 103 MMOL/L
CO2 SERPL-SCNC: 19 MMOL/L
CREAT SERPL-MCNC: 6.19 MG/DL
GLUCOSE SERPL-MCNC: 86 MG/DL
PHOSPHATE SERPL-MCNC: 6.6 MG/DL
POTASSIUM SERPL-SCNC: 5.2 MMOL/L
SODIUM SERPL-SCNC: 140 MMOL/L

## 2018-01-03 ENCOUNTER — APPOINTMENT (OUTPATIENT)
Dept: NEPHROLOGY | Facility: CLINIC | Age: 58
End: 2018-01-03

## 2018-01-14 NOTE — ED ADULT NURSE NOTE - PT NEEDS ASSIST
EXAMINATION:
NONCONTRAST HEAD CT
NONCONTRAST CERVICAL SPINE CT
 
INDICATION INFORMATION:
Paranoid. Head trauma. Cervical spine trauma.
 
COMPARISON:
None
 
TECHNIQUE:
Separate noncontrast CT examinations of the head and cervical spine were
performed. Coronal and sagittal images were created for each examination at
the technologist workstation.
 
DLP:
1055 mGy-cm
 
FINDINGS:
Head:
There is no evidence of acute intracranial hemorrhage or territorial
infarction. No abnormal mass effect or midline shift is seen. Gray to white
matter differentiation is well preserved. No extra-axial fluid collections
are identified.
 
No hydrocephalus. No significant volume loss. There is no abnormal
attenuation within the brain parenchyma. The osseous structures and soft
tissues are normal. The mastoid air cells and visualized portions of the
paranasal sinuses are well aerated.
 
Cervical spine:
There is anatomic alignment of the vertebral bodies and posterior elements.
The atlantoaxial and atlantooccipital articulations are intact. Vertebral
body heights and intervertebral disc spaces are maintained.  No evidence of
acute fracture. No prevertebral soft tissue swelling.
 
Visualized portions of the lung apices are unremarkable. The thyroid gland is
unremarkable.
 
IMPRESSION:
 
1. No acute intracranial findings.
2. Unremarkable appearance of the cervical spine. yes

## 2018-01-15 ENCOUNTER — FORM ENCOUNTER (OUTPATIENT)
Age: 58
End: 2018-01-15

## 2018-01-16 ENCOUNTER — APPOINTMENT (OUTPATIENT)
Age: 58
End: 2018-01-16
Payer: MEDICAID

## 2018-01-16 PROCEDURE — 36907Z: CUSTOM | Mod: 59

## 2018-01-16 PROCEDURE — 36902Z: CUSTOM

## 2018-01-23 ENCOUNTER — EMERGENCY (EMERGENCY)
Facility: HOSPITAL | Age: 58
LOS: 1 days | Discharge: HOME | End: 2018-01-23
Attending: EMERGENCY MEDICINE | Admitting: EMERGENCY MEDICINE
Payer: MEDICAID

## 2018-01-23 VITALS
DIASTOLIC BLOOD PRESSURE: 85 MMHG | RESPIRATION RATE: 16 BRPM | HEART RATE: 85 BPM | SYSTOLIC BLOOD PRESSURE: 138 MMHG | OXYGEN SATURATION: 100 %

## 2018-01-23 VITALS
DIASTOLIC BLOOD PRESSURE: 72 MMHG | OXYGEN SATURATION: 96 % | SYSTOLIC BLOOD PRESSURE: 111 MMHG | RESPIRATION RATE: 16 BRPM | TEMPERATURE: 98 F | HEART RATE: 83 BPM

## 2018-01-23 DIAGNOSIS — Z99.2 DEPENDENCE ON RENAL DIALYSIS: Chronic | ICD-10-CM

## 2018-01-23 DIAGNOSIS — I77.0 ARTERIOVENOUS FISTULA, ACQUIRED: Chronic | ICD-10-CM

## 2018-01-23 DIAGNOSIS — T85.898A OTHER SPECIFIED COMPLICATION OF OTHER INTERNAL PROSTHETIC DEVICES, IMPLANTS AND GRAFTS, INITIAL ENCOUNTER: Chronic | ICD-10-CM

## 2018-01-23 LAB
ALBUMIN SERPL ELPH-MCNC: 4.5 G/DL — SIGNIFICANT CHANGE UP (ref 3.3–5)
ALP SERPL-CCNC: 164 U/L — HIGH (ref 40–120)
ALT FLD-CCNC: 10 U/L RC — SIGNIFICANT CHANGE UP (ref 10–45)
ANION GAP SERPL CALC-SCNC: 15 MMOL/L — SIGNIFICANT CHANGE UP (ref 5–17)
ANISOCYTOSIS BLD QL: SLIGHT — SIGNIFICANT CHANGE UP
AST SERPL-CCNC: 20 U/L — SIGNIFICANT CHANGE UP (ref 10–40)
BASOPHILS # BLD AUTO: 0.1 K/UL — SIGNIFICANT CHANGE UP (ref 0–0.2)
BASOPHILS NFR BLD AUTO: 0.6 % — SIGNIFICANT CHANGE UP (ref 0–2)
BILIRUB SERPL-MCNC: 1.3 MG/DL — HIGH (ref 0.2–1.2)
BUN SERPL-MCNC: 13 MG/DL — SIGNIFICANT CHANGE UP (ref 7–23)
CALCIUM SERPL-MCNC: 9.1 MG/DL — SIGNIFICANT CHANGE UP (ref 8.4–10.5)
CHLORIDE SERPL-SCNC: 97 MMOL/L — SIGNIFICANT CHANGE UP (ref 96–108)
CK SERPL-CCNC: 40 U/L — SIGNIFICANT CHANGE UP (ref 25–170)
CO2 SERPL-SCNC: 27 MMOL/L — SIGNIFICANT CHANGE UP (ref 22–31)
CREAT SERPL-MCNC: 3.18 MG/DL — HIGH (ref 0.5–1.3)
DACRYOCYTES BLD QL SMEAR: SLIGHT — SIGNIFICANT CHANGE UP
ELLIPTOCYTES BLD QL SMEAR: SLIGHT — SIGNIFICANT CHANGE UP
EOSINOPHIL # BLD AUTO: 0.3 K/UL — SIGNIFICANT CHANGE UP (ref 0–0.5)
EOSINOPHIL NFR BLD AUTO: 3 % — SIGNIFICANT CHANGE UP (ref 0–6)
GIANT PLATELETS BLD QL SMEAR: PRESENT — SIGNIFICANT CHANGE UP
GLUCOSE SERPL-MCNC: 103 MG/DL — HIGH (ref 70–99)
HCT VFR BLD CALC: 33.4 % — LOW (ref 34.5–45)
HGB BLD-MCNC: 10.6 G/DL — LOW (ref 11.5–15.5)
HYPOCHROMIA BLD QL: SLIGHT — SIGNIFICANT CHANGE UP
LYMPHOCYTES # BLD AUTO: 1 K/UL — SIGNIFICANT CHANGE UP (ref 1–3.3)
LYMPHOCYTES # BLD AUTO: 9.1 % — LOW (ref 13–44)
MACROCYTES BLD QL: SLIGHT — SIGNIFICANT CHANGE UP
MAGNESIUM SERPL-MCNC: 2 MG/DL — SIGNIFICANT CHANGE UP (ref 1.6–2.6)
MCHC RBC-ENTMCNC: 21 PG — LOW (ref 27–34)
MCHC RBC-ENTMCNC: 31.6 GM/DL — LOW (ref 32–36)
MCV RBC AUTO: 66.4 FL — LOW (ref 80–100)
MICROCYTES BLD QL: SIGNIFICANT CHANGE UP
MONOCYTES # BLD AUTO: 0.3 K/UL — SIGNIFICANT CHANGE UP (ref 0–0.9)
MONOCYTES NFR BLD AUTO: 2.3 % — SIGNIFICANT CHANGE UP (ref 2–14)
NEUTROPHILS # BLD AUTO: 9.8 K/UL — HIGH (ref 1.8–7.4)
NEUTROPHILS NFR BLD AUTO: 85 % — HIGH (ref 43–77)
PHOSPHATE SERPL-MCNC: 3.2 MG/DL — SIGNIFICANT CHANGE UP (ref 2.5–4.5)
PLAT MORPH BLD: NORMAL — SIGNIFICANT CHANGE UP
PLATELET # BLD AUTO: 334 K/UL — SIGNIFICANT CHANGE UP (ref 150–400)
POIKILOCYTOSIS BLD QL AUTO: SLIGHT — SIGNIFICANT CHANGE UP
POTASSIUM SERPL-MCNC: 4.6 MMOL/L — SIGNIFICANT CHANGE UP (ref 3.5–5.3)
POTASSIUM SERPL-SCNC: 4.6 MMOL/L — SIGNIFICANT CHANGE UP (ref 3.5–5.3)
PROT SERPL-MCNC: 8.7 G/DL — HIGH (ref 6–8.3)
RBC # BLD: 5.03 M/UL — SIGNIFICANT CHANGE UP (ref 3.8–5.2)
RBC # FLD: 18.1 % — HIGH (ref 10.3–14.5)
RBC BLD AUTO: ABNORMAL
SCHISTOCYTES BLD QL AUTO: SLIGHT — SIGNIFICANT CHANGE UP
SODIUM SERPL-SCNC: 139 MMOL/L — SIGNIFICANT CHANGE UP (ref 135–145)
TARGETS BLD QL SMEAR: SLIGHT — SIGNIFICANT CHANGE UP
WBC # BLD: 11.5 K/UL — HIGH (ref 3.8–10.5)
WBC # FLD AUTO: 11.5 K/UL — HIGH (ref 3.8–10.5)

## 2018-01-23 PROCEDURE — 72070 X-RAY EXAM THORAC SPINE 2VWS: CPT | Mod: 26

## 2018-01-23 PROCEDURE — 83735 ASSAY OF MAGNESIUM: CPT

## 2018-01-23 PROCEDURE — 72148 MRI LUMBAR SPINE W/O DYE: CPT | Mod: 26

## 2018-01-23 PROCEDURE — 93010 ELECTROCARDIOGRAM REPORT: CPT

## 2018-01-23 PROCEDURE — 84100 ASSAY OF PHOSPHORUS: CPT

## 2018-01-23 PROCEDURE — 72070 X-RAY EXAM THORAC SPINE 2VWS: CPT

## 2018-01-23 PROCEDURE — 99285 EMERGENCY DEPT VISIT HI MDM: CPT | Mod: 25

## 2018-01-23 PROCEDURE — 72100 X-RAY EXAM L-S SPINE 2/3 VWS: CPT | Mod: 26

## 2018-01-23 PROCEDURE — 93005 ELECTROCARDIOGRAM TRACING: CPT

## 2018-01-23 PROCEDURE — 99284 EMERGENCY DEPT VISIT MOD MDM: CPT | Mod: 25

## 2018-01-23 PROCEDURE — 72100 X-RAY EXAM L-S SPINE 2/3 VWS: CPT

## 2018-01-23 PROCEDURE — 85027 COMPLETE CBC AUTOMATED: CPT

## 2018-01-23 PROCEDURE — 72148 MRI LUMBAR SPINE W/O DYE: CPT

## 2018-01-23 PROCEDURE — 72146 MRI CHEST SPINE W/O DYE: CPT | Mod: 26

## 2018-01-23 PROCEDURE — 96374 THER/PROPH/DIAG INJ IV PUSH: CPT

## 2018-01-23 PROCEDURE — 72146 MRI CHEST SPINE W/O DYE: CPT

## 2018-01-23 PROCEDURE — 80053 COMPREHEN METABOLIC PANEL: CPT

## 2018-01-23 PROCEDURE — 82550 ASSAY OF CK (CPK): CPT

## 2018-01-23 RX ORDER — ACETAMINOPHEN 500 MG
1000 TABLET ORAL ONCE
Qty: 0 | Refills: 0 | Status: COMPLETED | OUTPATIENT
Start: 2018-01-23 | End: 2018-01-23

## 2018-01-23 RX ADMIN — Medication 400 MILLIGRAM(S): at 17:32

## 2018-01-23 NOTE — ED ADULT NURSE NOTE - OBJECTIVE STATEMENT
56 y/o female hx ESRD on dialysis T, R, Sat presents to the ED c/o lower back/sacrum pain sent to Ed by dialysis center to r/u cord compression. PT presents with lower extremity weakness, no trauma/fall/injury to back. Denies fever, chills, n/v, urinary/stool incontinence. PT able to ambulate with steady ambulation bu displays some weakness. PT A&Ox3, in no respiratory distress, no CP, resting comfortably with family at bedside.

## 2018-01-23 NOTE — ED PROVIDER NOTE - CARE PLAN
Principal Discharge DX:	Acute bilateral low back pain without sciatica  Secondary Diagnosis:	Leg weakness, bilateral Principal Discharge DX:	Acute bilateral low back pain without sciatica  Assessment and plan of treatment:	1. Please follow up with your PMD in next 1-2 days.  2. Please call [phone] tomorrow morning and schedule a follow up appointment with Dr. Manjarrez to be seen within the next week for further evaluation and management. Please bring your printed results with you to the appointment.  3. Take Tylenol 650mg 1 tab every 4-6 hours as needed for pain.  4. Return to the ED immediately if you develop any worsening symptoms, numbness/tingling in the legs, bowel/bladder incontinence, increased pain, fever/chills, or all other concerns.  Secondary Diagnosis:	Leg weakness, bilateral Principal Discharge DX:	Acute bilateral low back pain without sciatica  Assessment and plan of treatment:	1. Please follow up with your PMD in next 1-2 days.  2. Please call [phone] tomorrow morning and schedule a follow up appointment with Dr. Manjarrez to be seen within the next week for further evaluation and management. Please bring your printed results with you to the appointment.  3. Please call and schedule an appointment with neurology clinic ([phone]) to be seen within the week for further evaluation and management. Additionally follow up with your nephrologist/dialysis physician within the week. Bring results with you.  4. Take Tylenol 650mg 1 tab every 4-6 hours as needed for pain.  5. Return to the ED immediately if you develop any worsening symptoms, numbness/tingling in the legs, bowel/bladder incontinence, increased pain, fever/chills, or all other concerns.  Secondary Diagnosis:	Leg weakness, bilateral

## 2018-01-23 NOTE — ED PROVIDER NOTE - OBJECTIVE STATEMENT
57 y.o. female hx of ESRD on HD T, TH, Sat p/w lower back pain and thigh pain. Patient states she developed pain in the lower back/sacrum with no trauma or injury. She took oxycodone for pain which did alleviate symptoms somewhat. She states that yesterday she began to develop significant pain in the anterior thighs bilaterally as well as weakness of the lower extremities. She completed her dialysis today but the physician at her dialysis center sent her to the ED to rule out cord compression. Denies fevers, chills, abdominal pain, bowel/bladder incontinence, saddle anesthesia. Is able to ambulate but feels weak.

## 2018-01-23 NOTE — ED ADULT NURSE REASSESSMENT NOTE - NS ED NURSE REASSESS COMMENT FT1
1915 - report taken from RNYesika. Pt. A&Ox4. Pt. dialysis pt. Last received dialysis today. Pt. reports lower back pain. No trauma. MD aware of pt.'s pain. Skin warm, dry and intact. VSS. Family at bedside. 1915 - report taken from RNYesika. Pt. A&Ox4. Pt. dialysis pt. Last received dialysis today. Fistula in left arm. Pt. reports lower back pain. No trauma. MD aware of pt.'s pain. Pt. just returned from IR - awaiting results to r/o cord compression. Skin warm, dry and intact. VSS. Family at bedside.

## 2018-01-23 NOTE — CONSULT NOTE ADULT - SUBJECTIVE AND OBJECTIVE BOX
Orthopedic Spine Surgery Note    57yFemale with h/o ESRD on HD who presents w/ LBP and b/l anterior thigh pain since yesterday. Reports sudden onset No inciting trauma. Denies numbness, tingling, but reports generalized weakness but is able to ambulate and climb stairs. Denies bowel/bladder incontinence and saddle anesthesia. Denies fevers/chills.    HEALTH ISSUES - PROBLEM Dx:    MEDICATIONS  (STANDING):      Allergies    No Known Allergies    Intolerances        Gen: NAD, slowed shuffling gait  Neck/Back: skin intact, no deformity  +midline TTP mid-T and L spine, no stepoff    Neuro:    Motor:                   C5                C6              C7               C8           T1   R            4/5                4/5            4/5             4/5          4/5  L             4/5               4/5             4/5             4/5          4/5                L2             L3             L4               L5            S1  R         4/5           4/5          5/5             5/5           5/5  L          4/5          4/5           5/5             5/5           5/5    Sensory:            C5         C6         C7      C8       T1        (0=absent, 1=impaired, 2=normal, NT=not testable)  R         2            2           2        2         2  L          2            2           2        2         2               L2          L3         L4      L5       S1         (0=absent, 1=impaired, 2=normal, NT=not testable)  R         2            2            2        2        2  L          2            2           2        2         2    Negative clonus  Negative babinski  Negative lange  good rectal tone  No saddle anesthesia    Imaging:   < from: MR Thoracic Spine No Cont (01.23.18 @ 18:37) >  IMPRESSION:    THORACIC SPINE MRI: No spinal cord compression or grossly abnormal   intrinsic cord signal. No spinal canal stenosis or neural foraminal   narrowing. No evidence for epidural collection.    Marrow signal low on T1-weighted images, this could represent the   presence of anemia or an infiltrative process. Clinical correlation   recommended.    LUMBAR SPINE MRI: Conus normal in size, position, and signal. No   significant spinal canal stenosis or neural foraminal narrowing. No   epidural collection.    Marrow signal low on T1-weighted images, this could represent the   presence of anemia or an infiltrative process. Clinical correlation   recommended.    < end of copied text >      AP: 57yFemale with LBP and b/l anterior thigh pain, low concern for acute cord compression given exam and imaging  Pain control  WBAT with assistive devices as needed  FU Labs  FU XR T/L spine  No acute orthopedic intervention  FU neuro/renal recs  SCDs  FU with Dr. Manjarrez and outpatient. Call [phone] for appointment

## 2018-01-23 NOTE — ED PROVIDER NOTE - PROGRESS NOTE DETAILS
Spoke with pt regarding MRI and lab results with pt at bedside as well as orthopedic, renal, and neuro follow up as outpatient. Pt understood. Pt stable for discharge. - Casey Esparza PA-C

## 2018-01-30 ENCOUNTER — APPOINTMENT (OUTPATIENT)
Dept: VASCULAR SURGERY | Facility: CLINIC | Age: 58
End: 2018-01-30
Payer: MEDICAID

## 2018-01-30 PROCEDURE — 99213 OFFICE O/P EST LOW 20 MIN: CPT

## 2018-01-30 PROCEDURE — 93990 DOPPLER FLOW TESTING: CPT

## 2018-02-02 ENCOUNTER — APPOINTMENT (OUTPATIENT)
Dept: INTERNAL MEDICINE | Facility: CLINIC | Age: 58
End: 2018-02-02

## 2018-02-02 ENCOUNTER — OUTPATIENT (OUTPATIENT)
Dept: OUTPATIENT SERVICES | Facility: HOSPITAL | Age: 58
LOS: 1 days | End: 2018-02-02
Payer: SELF-PAY

## 2018-02-02 VITALS
DIASTOLIC BLOOD PRESSURE: 68 MMHG | OXYGEN SATURATION: 98 % | SYSTOLIC BLOOD PRESSURE: 110 MMHG | BODY MASS INDEX: 22.85 KG/M2 | HEART RATE: 87 BPM | WEIGHT: 117 LBS

## 2018-02-02 DIAGNOSIS — I77.0 ARTERIOVENOUS FISTULA, ACQUIRED: Chronic | ICD-10-CM

## 2018-02-02 DIAGNOSIS — T85.898A OTHER SPECIFIED COMPLICATION OF OTHER INTERNAL PROSTHETIC DEVICES, IMPLANTS AND GRAFTS, INITIAL ENCOUNTER: Chronic | ICD-10-CM

## 2018-02-02 DIAGNOSIS — Z99.2 DEPENDENCE ON RENAL DIALYSIS: Chronic | ICD-10-CM

## 2018-02-02 DIAGNOSIS — Z23 ENCOUNTER FOR IMMUNIZATION: ICD-10-CM

## 2018-02-02 DIAGNOSIS — I10 ESSENTIAL (PRIMARY) HYPERTENSION: ICD-10-CM

## 2018-02-02 PROCEDURE — G0463: CPT

## 2018-02-03 RX ORDER — DOCUSATE SODIUM 100 MG/1
100 CAPSULE ORAL 3 TIMES DAILY
Qty: 270 | Refills: 3 | Status: DISCONTINUED | COMMUNITY
Start: 2017-08-18 | End: 2018-02-03

## 2018-02-03 RX ORDER — CALCIUM ACETATE 667 MG
667 TABLET ORAL 3 TIMES DAILY
Qty: 270 | Refills: 1 | Status: DISCONTINUED | COMMUNITY
Start: 2017-11-09 | End: 2018-02-03

## 2018-02-03 RX ORDER — SODIUM POLYSTYRENE SULFONATE 15 G/60ML
15 SUSPENSION ORAL; RECTAL
Refills: 2 | Status: DISCONTINUED | COMMUNITY
Start: 2017-02-03 | End: 2018-02-03

## 2018-02-03 RX ORDER — SODIUM BICARBONATE 650 MG/1
650 TABLET ORAL 3 TIMES DAILY
Qty: 540 | Refills: 0 | Status: DISCONTINUED | COMMUNITY
Start: 2017-03-06 | End: 2018-02-03

## 2018-02-03 RX ORDER — SENNOSIDES 8.6 MG/1
8.6 CAPSULE, GELATIN COATED ORAL
Qty: 180 | Refills: 3 | Status: DISCONTINUED | COMMUNITY
Start: 2017-08-18 | End: 2018-02-03

## 2018-02-15 DIAGNOSIS — G57.00 LESION OF SCIATIC NERVE, UNSPECIFIED LOWER LIMB: ICD-10-CM

## 2018-02-23 ENCOUNTER — OUTPATIENT (OUTPATIENT)
Dept: OUTPATIENT SERVICES | Facility: HOSPITAL | Age: 58
LOS: 1 days | End: 2018-02-23
Payer: SELF-PAY

## 2018-02-23 ENCOUNTER — APPOINTMENT (OUTPATIENT)
Dept: INTERNAL MEDICINE | Facility: CLINIC | Age: 58
End: 2018-02-23

## 2018-02-23 VITALS
BODY MASS INDEX: 22.38 KG/M2 | HEART RATE: 88 BPM | SYSTOLIC BLOOD PRESSURE: 124 MMHG | HEIGHT: 60 IN | DIASTOLIC BLOOD PRESSURE: 80 MMHG | WEIGHT: 114 LBS | OXYGEN SATURATION: 98 %

## 2018-02-23 DIAGNOSIS — M79.604 PAIN IN RIGHT LEG: ICD-10-CM

## 2018-02-23 DIAGNOSIS — G57.00 LESION OF SCIATIC NERVE, UNSPECIFIED LOWER LIMB: ICD-10-CM

## 2018-02-23 DIAGNOSIS — N18.6 END STAGE RENAL DISEASE: ICD-10-CM

## 2018-02-23 DIAGNOSIS — I77.0 ARTERIOVENOUS FISTULA, ACQUIRED: Chronic | ICD-10-CM

## 2018-02-23 DIAGNOSIS — I10 ESSENTIAL (PRIMARY) HYPERTENSION: ICD-10-CM

## 2018-02-23 DIAGNOSIS — T85.898A OTHER SPECIFIED COMPLICATION OF OTHER INTERNAL PROSTHETIC DEVICES, IMPLANTS AND GRAFTS, INITIAL ENCOUNTER: Chronic | ICD-10-CM

## 2018-02-23 DIAGNOSIS — Z23 ENCOUNTER FOR IMMUNIZATION: ICD-10-CM

## 2018-02-23 DIAGNOSIS — Z99.2 DEPENDENCE ON RENAL DIALYSIS: Chronic | ICD-10-CM

## 2018-02-23 PROCEDURE — 90746 HEPB VACCINE 3 DOSE ADULT IM: CPT

## 2018-02-23 PROCEDURE — G0010: CPT

## 2018-02-23 PROCEDURE — G0463: CPT

## 2018-03-10 ENCOUNTER — INPATIENT (INPATIENT)
Facility: HOSPITAL | Age: 58
LOS: 5 days | Discharge: HOME | DRG: 313 | End: 2018-03-16
Attending: INTERNAL MEDICINE | Admitting: STUDENT IN AN ORGANIZED HEALTH CARE EDUCATION/TRAINING PROGRAM
Payer: MEDICAID

## 2018-03-10 VITALS
SYSTOLIC BLOOD PRESSURE: 121 MMHG | HEART RATE: 80 BPM | RESPIRATION RATE: 17 BRPM | OXYGEN SATURATION: 97 % | DIASTOLIC BLOOD PRESSURE: 70 MMHG

## 2018-03-10 DIAGNOSIS — I77.0 ARTERIOVENOUS FISTULA, ACQUIRED: Chronic | ICD-10-CM

## 2018-03-10 DIAGNOSIS — I10 ESSENTIAL (PRIMARY) HYPERTENSION: ICD-10-CM

## 2018-03-10 DIAGNOSIS — R07.9 CHEST PAIN, UNSPECIFIED: ICD-10-CM

## 2018-03-10 DIAGNOSIS — K27.9 PEPTIC ULCER, SITE UNSPECIFIED, UNSPECIFIED AS ACUTE OR CHRONIC, WITHOUT HEMORRHAGE OR PERFORATION: ICD-10-CM

## 2018-03-10 DIAGNOSIS — D45 POLYCYTHEMIA VERA: ICD-10-CM

## 2018-03-10 DIAGNOSIS — K76.9 LIVER DISEASE, UNSPECIFIED: ICD-10-CM

## 2018-03-10 DIAGNOSIS — N18.6 END STAGE RENAL DISEASE: ICD-10-CM

## 2018-03-10 DIAGNOSIS — Z99.2 DEPENDENCE ON RENAL DIALYSIS: Chronic | ICD-10-CM

## 2018-03-10 DIAGNOSIS — Z29.9 ENCOUNTER FOR PROPHYLACTIC MEASURES, UNSPECIFIED: ICD-10-CM

## 2018-03-10 DIAGNOSIS — N18.9 CHRONIC KIDNEY DISEASE, UNSPECIFIED: ICD-10-CM

## 2018-03-10 DIAGNOSIS — T85.898A OTHER SPECIFIED COMPLICATION OF OTHER INTERNAL PROSTHETIC DEVICES, IMPLANTS AND GRAFTS, INITIAL ENCOUNTER: Chronic | ICD-10-CM

## 2018-03-10 LAB
ALBUMIN SERPL ELPH-MCNC: 4.4 G/DL — SIGNIFICANT CHANGE UP (ref 3.3–5)
ALP SERPL-CCNC: 148 U/L — HIGH (ref 40–120)
ALT FLD-CCNC: 5 U/L RC — LOW (ref 10–45)
ANION GAP SERPL CALC-SCNC: 17 MMOL/L — SIGNIFICANT CHANGE UP (ref 5–17)
ANISOCYTOSIS BLD QL: SIGNIFICANT CHANGE UP
APTT BLD: 33.5 SEC — SIGNIFICANT CHANGE UP (ref 27.5–37.4)
AST SERPL-CCNC: 20 U/L — SIGNIFICANT CHANGE UP (ref 10–40)
BASE EXCESS BLDV CALC-SCNC: 8.2 MMOL/L — HIGH (ref -2–2)
BASOPHILS # BLD AUTO: 0.1 K/UL — SIGNIFICANT CHANGE UP (ref 0–0.2)
BASOPHILS NFR BLD AUTO: 0.5 % — SIGNIFICANT CHANGE UP (ref 0–2)
BILIRUB SERPL-MCNC: 1.5 MG/DL — HIGH (ref 0.2–1.2)
BUN SERPL-MCNC: 18 MG/DL — SIGNIFICANT CHANGE UP (ref 7–23)
CA-I SERPL-SCNC: 0.96 MMOL/L — LOW (ref 1.12–1.3)
CALCIUM SERPL-MCNC: 9 MG/DL — SIGNIFICANT CHANGE UP (ref 8.4–10.5)
CHLORIDE BLDV-SCNC: 98 MMOL/L — SIGNIFICANT CHANGE UP (ref 96–108)
CHLORIDE SERPL-SCNC: 92 MMOL/L — LOW (ref 96–108)
CK MB CFR SERPL CALC: 1 NG/ML — SIGNIFICANT CHANGE UP (ref 0–3.8)
CK SERPL-CCNC: 22 U/L — LOW (ref 25–170)
CO2 BLDV-SCNC: 34 MMOL/L — HIGH (ref 22–30)
CO2 SERPL-SCNC: 27 MMOL/L — SIGNIFICANT CHANGE UP (ref 22–31)
CREAT SERPL-MCNC: 3.29 MG/DL — HIGH (ref 0.5–1.3)
DACRYOCYTES BLD QL SMEAR: SLIGHT — SIGNIFICANT CHANGE UP
ELLIPTOCYTES BLD QL SMEAR: SLIGHT — SIGNIFICANT CHANGE UP
EOSINOPHIL # BLD AUTO: 0.2 K/UL — SIGNIFICANT CHANGE UP (ref 0–0.5)
EOSINOPHIL NFR BLD AUTO: 2.1 % — SIGNIFICANT CHANGE UP (ref 0–6)
GAS PNL BLDV: 130 MMOL/L — LOW (ref 136–145)
GAS PNL BLDV: SIGNIFICANT CHANGE UP
GAS PNL BLDV: SIGNIFICANT CHANGE UP
GLUCOSE BLDV-MCNC: 81 MG/DL — SIGNIFICANT CHANGE UP (ref 70–99)
GLUCOSE SERPL-MCNC: 89 MG/DL — SIGNIFICANT CHANGE UP (ref 70–99)
HCO3 BLDV-SCNC: 32 MMOL/L — HIGH (ref 21–29)
HCT VFR BLD CALC: 38.9 % — SIGNIFICANT CHANGE UP (ref 34.5–45)
HCT VFR BLDA CALC: 36 % — LOW (ref 39–50)
HGB BLD CALC-MCNC: 11.6 G/DL — SIGNIFICANT CHANGE UP (ref 11.5–15.5)
HGB BLD-MCNC: 12.3 G/DL — SIGNIFICANT CHANGE UP (ref 11.5–15.5)
INR BLD: 1.13 RATIO — SIGNIFICANT CHANGE UP (ref 0.88–1.16)
LACTATE BLDV-MCNC: 1.6 MMOL/L — SIGNIFICANT CHANGE UP (ref 0.7–2)
LIDOCAIN IGE QN: 93 U/L — HIGH (ref 7–60)
LYMPHOCYTES # BLD AUTO: 1.2 K/UL — SIGNIFICANT CHANGE UP (ref 1–3.3)
LYMPHOCYTES # BLD AUTO: 12.2 % — LOW (ref 13–44)
MACROCYTES BLD QL: SLIGHT — SIGNIFICANT CHANGE UP
MCHC RBC-ENTMCNC: 24.1 PG — LOW (ref 27–34)
MCHC RBC-ENTMCNC: 31.5 GM/DL — LOW (ref 32–36)
MCV RBC AUTO: 76.5 FL — LOW (ref 80–100)
MICROCYTES BLD QL: SLIGHT — SIGNIFICANT CHANGE UP
MONOCYTES # BLD AUTO: 0.3 K/UL — SIGNIFICANT CHANGE UP (ref 0–0.9)
MONOCYTES NFR BLD AUTO: 2.8 % — SIGNIFICANT CHANGE UP (ref 2–14)
NEUTROPHILS # BLD AUTO: 8.4 K/UL — HIGH (ref 1.8–7.4)
NEUTROPHILS NFR BLD AUTO: 82.4 % — HIGH (ref 43–77)
PCO2 BLDV: 43 MMHG — SIGNIFICANT CHANGE UP (ref 35–50)
PH BLDV: 7.48 — HIGH (ref 7.35–7.45)
PLAT MORPH BLD: NORMAL — SIGNIFICANT CHANGE UP
PLATELET # BLD AUTO: 237 K/UL — SIGNIFICANT CHANGE UP (ref 150–400)
PO2 BLDV: 53 MMHG — HIGH (ref 25–45)
POIKILOCYTOSIS BLD QL AUTO: SLIGHT — SIGNIFICANT CHANGE UP
POLYCHROMASIA BLD QL SMEAR: SLIGHT — SIGNIFICANT CHANGE UP
POTASSIUM BLDV-SCNC: 4.1 MMOL/L — SIGNIFICANT CHANGE UP (ref 3.5–5)
POTASSIUM SERPL-MCNC: 4.1 MMOL/L — SIGNIFICANT CHANGE UP (ref 3.5–5.3)
POTASSIUM SERPL-SCNC: 4.1 MMOL/L — SIGNIFICANT CHANGE UP (ref 3.5–5.3)
PROT SERPL-MCNC: 8.8 G/DL — HIGH (ref 6–8.3)
PROTHROM AB SERPL-ACNC: 12.3 SEC — SIGNIFICANT CHANGE UP (ref 9.8–12.7)
RBC # BLD: 5.09 M/UL — SIGNIFICANT CHANGE UP (ref 3.8–5.2)
RBC # FLD: 24.8 % — HIGH (ref 10.3–14.5)
RBC BLD AUTO: ABNORMAL
SAO2 % BLDV: 85 % — SIGNIFICANT CHANGE UP (ref 67–88)
SODIUM SERPL-SCNC: 136 MMOL/L — SIGNIFICANT CHANGE UP (ref 135–145)
STOMATOCYTES BLD QL SMEAR: PRESENT — SIGNIFICANT CHANGE UP
TROPONIN T SERPL-MCNC: <0.01 NG/ML — SIGNIFICANT CHANGE UP (ref 0–0.06)
WBC # BLD: 10.2 K/UL — SIGNIFICANT CHANGE UP (ref 3.8–10.5)
WBC # FLD AUTO: 10.2 K/UL — SIGNIFICANT CHANGE UP (ref 3.8–10.5)

## 2018-03-10 PROCEDURE — 71045 X-RAY EXAM CHEST 1 VIEW: CPT | Mod: 26

## 2018-03-10 PROCEDURE — 99285 EMERGENCY DEPT VISIT HI MDM: CPT | Mod: 25

## 2018-03-10 PROCEDURE — 74174 CTA ABD&PLVS W/CONTRAST: CPT | Mod: 26

## 2018-03-10 PROCEDURE — 71275 CT ANGIOGRAPHY CHEST: CPT | Mod: 26

## 2018-03-10 PROCEDURE — 99223 1ST HOSP IP/OBS HIGH 75: CPT | Mod: GC

## 2018-03-10 PROCEDURE — 93010 ELECTROCARDIOGRAM REPORT: CPT

## 2018-03-10 RX ORDER — CALCIUM CARBONATE 500(1250)
1 TABLET ORAL DAILY
Qty: 0 | Refills: 0 | Status: DISCONTINUED | OUTPATIENT
Start: 2018-03-10 | End: 2018-03-16

## 2018-03-10 RX ORDER — CALCIUM CARBONATE 500(1250)
2 TABLET ORAL
Qty: 0 | Refills: 0 | COMMUNITY

## 2018-03-10 RX ORDER — FUROSEMIDE 40 MG
3 TABLET ORAL
Qty: 0 | Refills: 0 | COMMUNITY

## 2018-03-10 RX ORDER — OMEPRAZOLE 10 MG/1
1 CAPSULE, DELAYED RELEASE ORAL
Qty: 0 | Refills: 0 | COMMUNITY

## 2018-03-10 RX ORDER — SODIUM BICARBONATE 1 MEQ/ML
1300 SYRINGE (ML) INTRAVENOUS THREE TIMES A DAY
Qty: 0 | Refills: 0 | Status: DISCONTINUED | OUTPATIENT
Start: 2018-03-10 | End: 2018-03-15

## 2018-03-10 RX ORDER — AMLODIPINE BESYLATE 2.5 MG/1
1 TABLET ORAL
Qty: 0 | Refills: 0 | COMMUNITY

## 2018-03-10 RX ORDER — SODIUM CHLORIDE 9 MG/ML
3 INJECTION INTRAMUSCULAR; INTRAVENOUS; SUBCUTANEOUS ONCE
Qty: 0 | Refills: 0 | Status: COMPLETED | OUTPATIENT
Start: 2018-03-10 | End: 2018-03-10

## 2018-03-10 RX ORDER — HYDROMORPHONE HYDROCHLORIDE 2 MG/ML
1 INJECTION INTRAMUSCULAR; INTRAVENOUS; SUBCUTANEOUS
Qty: 0 | Refills: 0 | COMMUNITY

## 2018-03-10 RX ORDER — FUROSEMIDE 40 MG
60 TABLET ORAL DAILY
Qty: 0 | Refills: 0 | Status: DISCONTINUED | OUTPATIENT
Start: 2018-03-10 | End: 2018-03-16

## 2018-03-10 RX ORDER — FAMOTIDINE 10 MG/ML
20 INJECTION INTRAVENOUS
Qty: 0 | Refills: 0 | Status: DISCONTINUED | OUTPATIENT
Start: 2018-03-10 | End: 2018-03-16

## 2018-03-10 RX ADMIN — SODIUM CHLORIDE 3 MILLILITER(S): 9 INJECTION INTRAMUSCULAR; INTRAVENOUS; SUBCUTANEOUS at 16:15

## 2018-03-10 NOTE — H&P ADULT - PROBLEM SELECTOR PLAN 5
- C/w Lasix 60 QD - Chest pain may be partially from PUD  - Patient is continuing to eat spicy foods - Chest pain may be from PUD?  - Patient is continuing to eat spicy foods  - pepcid for now

## 2018-03-10 NOTE — H&P ADULT - PROBLEM SELECTOR PLAN 3
- With marked splenomegaly and LUQ TTP likely - With marked splenomegaly and LUQ TTP likely from PV  - Patient had declined splenectomy in the past  - Not on hydroxyurea  - Will continue to monitor - ?early cirrhosis on CT. Patient with mild chronic hyperbilirubinemia and alk phos elevation. Patient has hepatitis panel checked and negative one year ago  - Had positive RAHUL in the past, will repeat, along with anti-SM, AMA, and microsomal Ab  - Will likely need GI f/u outpatient - ?early cirrhosis on CT. Patient with mild chronic hyperbilirubinemia and alk phos elevation. Patient has hepatitis panel checked and negative one year ago  - Had borderline positive RAHUL in the past, will repeat, along with anti-SM, AMA, and microsomal Ab  - may need GI f/u outpatient  - pt informed to avoid etoh and reduce tylenol use

## 2018-03-10 NOTE — CONSULT NOTE ADULT - PROBLEM SELECTOR RECOMMENDATION 9
Gets HD (TTS) at [facility] [place] via LUE AVF. Received 2.5 hours of maintenance HD today. Pt euvolemic with no signs of volume overload, labs with normal electrolytes. No indication for urgent HD. Resume HD (TTS).

## 2018-03-10 NOTE — H&P ADULT - PROBLEM SELECTOR PLAN 2
- patient seen by nephrology, no more HD needed for today  - Will c/w Tue/Thurs/Sat HD  - C/w Sodium bicarb

## 2018-03-10 NOTE — ED PROVIDER NOTE - PHYSICAL EXAMINATION
Gen: NAD, AOx3  Head: NCAT  HEENT: PERRL, oral mucosa moist, normal conjunctiva, neck supple  Lung: CTAB, no respiratory distress  CV: rrr, no murmur, Normal perfusion  Abd: soft, NTND  MSK: No edema, no visible deformities  Neuro: No focal neurologic deficits  Skin: No rash, +thrill left upper fistula, bleeding resolved  Psych: normal affect

## 2018-03-10 NOTE — H&P ADULT - NSHPPHYSICALEXAM_GEN_ALL_CORE
GENERAL: No acute distress  HEENT: PERRL, EOMI, MMM, no oropharyngeal lesions  NECK: supple, no stiffness, no JVD, no thyromegaly  PULM: respirations non-labored, slightly decreased RUL breath sounds. No rales, rhonchi, or wheezes  CV: regular rate and rhythm, no murmurs, gallops, or rubs  GI: abdomen lUQ and RUQ TTP. Soft, nondistended, no masses felt, normal bowel sounds  MSK: strength 5/5 bilateral upper/lower extremities. No joint swelling, erythema, or warmth.  LYMPH: no anterior cervical, posterior cervical, supraclavicular, or inguinal lymphadenopathy  NEURO: A&Ox3, no tremors, sensation intact  SKIN: no rashes, lesions, or edema GENERAL: No acute distress  HEENT: PERRL, EOMI, MMM, no oropharyngeal lesions  NECK: supple, no stiffness, no JVD, no thyromegaly  PULM: respirations non-labored, slightly decreased RUL breath sounds. No rales, rhonchi, or wheezes  CV: regular rate and rhythm, no murmurs, gallops, or rubs  GI: abdomen lUQ and RUQ TTP. Soft, nondistended, no masses felt, normal bowel sounds  BACK: no CVA TTP. B/l lower lumbar flank pain.  MSK: strength 5/5 bilateral upper/lower extremities. No joint swelling, erythema, or warmth.  LYMPH: no anterior cervical, posterior cervical, supraclavicular, or inguinal lymphadenopathy  NEURO: A&Ox3, no tremors, sensation intact  SKIN: no rashes, lesions, or edema

## 2018-03-10 NOTE — H&P ADULT - NSHPLABSRESULTS_GEN_ALL_CORE
Labs personally reviewed and interpreted. Notable for WBC stable at 10.5, Hb and plts stable, INR wnl. Lactate 1.6. na 136, K 4.1, AG 17, BUN/creatinine 18/3.29. TB stable at 1.5. AP stable at 148, AST/ALT wnl. Cardiac enzymes negative x1.    CXR personally reviewed and interpreted. Notable for trace left pleural effusion. No focal consolidations, widened mediastinum, interstitial markings, or pneumothorax.  CT personally reviewed and interpreted. Notable for no evidence of aortic dissection or intramural hematoma. Caudate hypertrophy and medial segment atrophy which can be seen in early cirrhosis. Marked splenomegaly. A previously characterized hematoma in the left   upper quadrant measures 2.7 x 2.1 cm, previously 5.5 x 5.0 cm.    EKG personally reviewed. Rate 69, , QTc 452. Normal sinus rhythm, normal axis. No pathological Q waves or ST changes. Labs personally reviewed and interpreted. Notable for WBC stable at 10.5, Hb and plts stable, INR wnl. Lactate 1.6. na 136, K 4.1, AG 17, BUN/creatinine 18/3.29. TB stable at 1.5. AP stable at 148, AST/ALT wnl. Cardiac enzymes negative x1.    CXR personally reviewed and interpreted. Notable for trace left pleural effusion. No focal consolidations, widened mediastinum, interstitial markings, or pneumothorax.  CT personally reviewed and interpreted. Notable for no evidence of aortic dissection or intramural hematoma. Caudate hypertrophy and medial segment atrophy which can be seen in early cirrhosis. Marked splenomegaly. A previously characterized hematoma in the left   upper quadrant measures 2.7 x 2.1 cm, previously 5.5 x 5.0 cm.    EKG personally reviewed. Rate 69, , QTc 452. Normal sinus rhythm, normal axis. No pathological Q waves or ST changes. Grossly unchanged from prior EKG in sunrise.

## 2018-03-10 NOTE — CONSULT NOTE ADULT - SUBJECTIVE AND OBJECTIVE BOX
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  Splenomegaly: 2015  Hypertension  Peptic ulcer disease  Polycythemia vera  AV fistula: Placed 9/18  Hemoperitoneum as complication of peritoneal dialysis: s/p removal 9/18  Peritoneal dialysis catheter in place: Placed 8/9/2017    FAMILY HISTORY:  Family history of malignant neoplasm (Grandparent)  Family history of hypertension in mother    PAST SOCIAL HISTORY:    ALLERGIES & MEDICATIONS  --------------------------------------------------------------------------------  Allergies    No Known Allergies    Intolerances      Standing Inpatient Medications    PRN Inpatient Medications      REVIEW OF SYSTEMS  --------------------------------------------------------------------------------  Gen: No weight changes, fatigue, fevers/chills, weakness  Skin: No rashes  Head/Eyes/Ears/Mouth: No headache; Normal hearing; Normal vision w/o blurriness; No sinus pain/discomfort, sore throat  Respiratory: No dyspnea, cough, wheezing, hemoptysis  CV: No chest pain, PND, orthopnea  GI: No abdominal pain, diarrhea, constipation, nausea, vomiting, melena, hematochezia  : No increased frequency, dysuria, hematuria, nocturia  MSK: No joint pain/swelling; no back pain; no edema  Neuro: No dizziness/lightheadedness, weakness, seizures, numbness, tingling  Heme: No easy bruising or bleeding  Endo: No heat/cold intolerance  Psych: No significant nervousness, anxiety, stress, depression    All other systems were reviewed and are negative, except as noted.    VITALS/PHYSICAL EXAM  --------------------------------------------------------------------------------  T(C): 36.7 (03-10-18 @ 18:21), Max: 36.7 (03-10-18 @ 18:21)  HR: 70 (03-10-18 @ 18:21) (69 - 80)  BP: 147/79 (03-10-18 @ 18:21) (121/70 - 149/81)  RR: 18 (03-10-18 @ 18:21) (17 - 18)  SpO2: 99% (03-10-18 @ 18:21) (97% - 99%)  Wt(kg): --        Physical Exam:  	Gen: NAD, well-appearing  	HEENT: PERRL, supple neck, clear oropharynx  	Pulm: CTA B/L  	CV: RRR, S1S2; no rub  	Back: No spinal or CVA tenderness; no sacral edema  	Abd: +BS, soft, nontender/nondistended  	: No suprapubic tenderness  	UE: Warm, FROM, no clubbing, intact strength; no edema; no asterixis  	LE: Warm, FROM, no clubbing, intact strength; no edema  	Neuro: No focal deficits, intact gait  	Psych: Normal affect and mood  	Skin: Warm, without rashes  	Vascular access:    LABS/STUDIES  --------------------------------------------------------------------------------              12.3   10.2  >-----------<  237      [03-10-18 @ 15:52]              38.9     136  |  92  |  18  ----------------------------<  89      [03-10-18 @ 15:52]  4.1   |  27  |  3.29        Ca     9.0     [03-10-18 @ 15:52]    TPro  8.8  /  Alb  4.4  /  TBili  1.5  /  DBili  x   /  AST  20  /  ALT  5   /  AlkPhos  148  [03-10-18 @ 15:52]    PT/INR: PT 12.3 , INR 1.13       [03-10-18 @ 15:52]  PTT: 33.5       [03-10-18 @ 15:52]    Troponin <0.01      [03-10-18 @ 15:52]  CK 22      [03-10-18 @ 15:52]    Creatinine Trend:  SCr 3.29 [03-10 @ 15:52]    Urinalysis - [10-11-17 @ 15:16]      Color Colorless / Appearance Clear / SG 1.008 / pH 7.0      Gluc Negative / Ketone Negative  / Bili Negative / Urobili Negative       Blood Negative / Protein 100 / Leuk Est Trace / Nitrite Negative      RBC  / WBC 10-25 / Hyaline  / Gran  / Sq Epi  / Non Sq Epi  / Bacteria       HbA1c 5.7      [01-18-17 @ 07:32]    HBsAb Nonreact      [01-19-17 @ 20:01]  HBsAg Nonreact      [01-19-17 @ 20:01]  HBcAb Nonreact      [02-02-17 @ 23:23]  HCV 0.20, Nonreact      [02-02-17 @ 23:23]  HIV Nonreact      [01-19-17 @ 20:01]    RAHUL: titer 1:80, pattern Speckled      [01-19-17 @ 20:01]  C3 Complement 100      [01-19-17 @ 20:01]  C4 Complement 32      [01-19-17 @ 20:01]  Rheumatoid Factor <7.0      [01-19-17 @ 20:01]  ANCA: cANCA Negative, pANCA Negative, atypical ANCA Negative      [01-19-17 @ 20:01]  anti-GBM <0.2      [01-20-17 @ 01:52]  ASLO 59      [01-19-17 @ 20:01]  Syphilis Screen (Treponema Pallidum Ab) Negative      [01-19-17 @ 20:01]  Free Light Chains: kappa 12.00, lambda 14.10, ratio = 0.85      [01-23 @ 14:39]  Immunofixation Serum:   No Monoclonal Band Identified      [01-23-17 @ 14:39]  SPEP Interpretation: Polyclonal Gammopathy      [01-23-17 @ 14:39] [facility] DIVISION OF KIDNEY DISEASES AND HYPERTENSION -- INITIAL CONSULT NOTE  --------------------------------------------------------------------------------  HPI:    57 year old woman with PMH of HTN, Polycythemia Vera on Hydroxyurea, and 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 Pain was sudden in onset, severe, felt like being kicked in the chest and radiating to the back, associated with dyspnea. Pt was 2.5 hours through HD and treatment was cut short by half an hour and patient sent to ED. Pt had a CT chest with IV contrast and CTA abdomen negative for PE and aortic dissection respectively. Upon my eval, pt is comfortable, states pain has resolved.      Pt known to renal service. Gets HD(TTS) in [facility] via [facility] AV[facility]. Her nephrologist is Dr. De León.          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  Splenomegaly: 2015  Hypertension  Peptic ulcer disease  Polycythemia vera  AV fistula: Placed 9/18  Hemoperitoneum as complication of peritoneal dialysis: s/p removal 9/18  Peritoneal dialysis catheter in place: Placed 8/9/2017    FAMILY HISTORY:  Family history of malignant neoplasm (Grandparent)  Family history of hypertension in mother    PAST SOCIAL HISTORY:    ALLERGIES & MEDICATIONS  --------------------------------------------------------------------------------  Allergies    No Known Allergies    Intolerances      Standing Inpatient Medications    PRN Inpatient Medications      REVIEW OF SYSTEMS  --------------------------------------------------------------------------------  Gen: No weight changes, fatigue, fevers/chills, weakness  Skin: No rashes  Head/Eyes/Ears/Mouth: No headache; Normal hearing; Normal vision w/o blurriness; No sinus pain/discomfort, sore throat  Respiratory:  dyspnea+ No cough, wheezing, hemoptysis  CV: Chest pain+ no PND, orthopnea  GI: No abdominal pain, diarrhea, constipation, nausea, vomiting, melena, hematochezia  : No increased frequency, dysuria, hematuria, nocturia  MSK: No joint pain/swelling; no back pain; no edema  Neuro: No dizziness/lightheadedness, weakness, seizures, numbness, tingling  Heme: No easy bruising or bleeding  Endo: No heat/cold intolerance  Psych: No significant nervousness, anxiety, stress, depression    All other systems were reviewed and are negative, except as noted.    VITALS/PHYSICAL EXAM  --------------------------------------------------------------------------------  T(C): 36.7 (03-10-18 @ 18:21), Max: 36.7 (03-10-18 @ 18:21)  HR: 70 (03-10-18 @ 18:21) (69 - 80)  BP: 147/79 (03-10-18 @ 18:21) (121/70 - 149/81)  RR: 18 (03-10-18 @ 18:21) (17 - 18)  SpO2: 99% (03-10-18 @ 18:21) (97% - 99%)  Wt(kg): --        Physical Exam:  	Gen: NAD, well-appearing  	HEENT: PERRL, supple neck  	Pulm: CTA B/L  	CV: RRR, S1S2; no rub  	Abd: +BS, soft, nontender/nondistended  	: No suprapubic tenderness  	UE: Warm, no edema; no asterixis  	LE: Warm,  no edema  	Skin: Warm, without rashes  	Vascular access: LUE AVF, thrill+ bruit+    LABS/STUDIES  --------------------------------------------------------------------------------              12.3   10.2  >-----------<  237      [03-10-18 @ 15:52]              38.9     136  |  92  |  18  ----------------------------<  89      [03-10-18 @ 15:52]  4.1   |  27  |  3.29        Ca     9.0     [03-10-18 @ 15:52]    TPro  8.8  /  Alb  4.4  /  TBili  1.5  /  DBili  x   /  AST  20  /  ALT  5   /  AlkPhos  148  [03-10-18 @ 15:52]    PT/INR: PT 12.3 , INR 1.13       [03-10-18 @ 15:52]  PTT: 33.5       [03-10-18 @ 15:52]    Troponin <0.01      [03-10-18 @ 15:52]  CK 22      [03-10-18 @ 15:52]    Creatinine Trend:  SCr 3.29 [03-10 @ 15:52]    Urinalysis - [10-11-17 @ 15:16]      Color Colorless / Appearance Clear / SG 1.008 / pH 7.0      Gluc Negative / Ketone Negative  / Bili Negative / Urobili Negative       Blood Negative / Protein 100 / Leuk Est Trace / Nitrite Negative      RBC  / WBC 10-25 / Hyaline  / Gran  / Sq Epi  / Non Sq Epi  / Bacteria       HbA1c 5.7      [01-18-17 @ 07:32]    HBsAb Nonreact      [01-19-17 @ 20:01]  HBsAg Nonreact      [01-19-17 @ 20:01]  HBcAb Nonreact      [02-02-17 @ 23:23]  HCV 0.20, Nonreact      [02-02-17 @ 23:23]  HIV Nonreact      [01-19-17 @ 20:01]    RAHUL: titer 1:80, pattern Speckled      [01-19-17 @ 20:01]  C3 Complement 100      [01-19-17 @ 20:01]  C4 Complement 32      [01-19-17 @ 20:01]  Rheumatoid Factor <7.0      [01-19-17 @ 20:01]  ANCA: cANCA Negative, pANCA Negative, atypical ANCA Negative      [01-19-17 @ 20:01]  anti-GBM <0.2      [01-20-17 @ 01:52]  ASLO 59      [01-19-17 @ 20:01]  Syphilis Screen (Treponema Pallidum Ab) Negative      [01-19-17 @ 20:01]  Free Light Chains: kappa 12.00, lambda 14.10, ratio = 0.85      [01-23 @ 14:39]  Immunofixation Serum:   No Monoclonal Band Identified      [01-23-17 @ 14:39]  SPEP Interpretation: Polyclonal Gammopathy      [01-23-17 @ 14:39]

## 2018-03-10 NOTE — ED PROVIDER NOTE - ATTENDING CONTRIBUTION TO CARE
I have seen and evaluated this patient with the resident.   I agree with the findings  unless other wise stated.  I have made appropriate changes in documentations where needed, After my face to face bedside evaluation, I am further  notinyo F with sudden onset severe CP during HD radiating into back. improved with NO, mild pain now. patient appears slightly uncomfortable but no pain, 'weak' appearing. unable to take BP in both arms due to fistula, concerning with pain radiating into back and elevated BP for dissection. unclear if has had cardiac w/u in past, on for chronic low back pain. high risk for ACS. will hold off on further ASA until CTA done.  No aortic dissection. labs with trop/ck. TBA. will need rest of HD today --Kaur

## 2018-03-10 NOTE — H&P ADULT - PROBLEM SELECTOR PLAN 7
- DVT PPX: low risk, patient ambulatory, h/o bleed  - Diet: DASH  - Dispo: pending negative cardiac enzymes and r/o - DVT PPX: low risk, patient ambulatory, h/o bleed  - Diet: DASH renal  - Dispo: pending negative cardiac enzymes and r/o

## 2018-03-10 NOTE — H&P ADULT - PROBLEM SELECTOR PLAN 1
- Patient ruled out for aortic dissection. Cardiac enzymes negative x1, will r/o x3. Patient is not having pain at this time, but does not appear musculoskeletal  - Ddx also includes GERD, would start PPI and also encouraged patient to decrease  spicy food intake  - Unlikely pancreatitis, but would   - Would consider stress TTE outpatient if enzymes negative, as cannot r/o that this chest pain isn't anginal - Patient ruled out for aortic dissection. R/o ACS, cardiac enzymes negative x1, will r/o x3. Patient is not having pain at this time, but does not appear musculoskeletal  - Ddx also includes GERD, would start PPI and also encouraged patient to decrease  spicy food intake  - Unlikely pancreatitis, but would add lipase  - In the setting of flank TTP, would also check UA to eval for kidney stones, melinda in the setting of PV  - Would consider stress TTE outpatient if enzymes negative, as cannot r/o that this chest pain isn't anginal - Patient ruled out for aortic dissection. R/o ACS, cardiac enzymes negative x1, will r/o x3. Patient is not having pain at this time, but does not appear musculoskeletal  - Telemetry, TTE  - Ddx also includes GERD, would start Pepcid and also encourage patient to decrease spicy food intake  - Unlikely pancreatitis, but would add lipase  - In the setting of flank TTP, would also check UA to eval for kidney stones, melinda in the setting of PV  - Would consider stress TTE outpatient if enzymes negative, as cannot r/o that this chest pain isn't anginal - Patient ruled out for aortic dissection. R/o ACS, cardiac enzymes negative x1, will r/o x3. Patient is not having pain at this time, but does not appear musculoskeletal  - Telemetry, TTE  - Ddx also includes GERD, would start Pepcid and also encourage patient to decrease spicy food intake  - Unlikely pancreatitis, but would add lipase  - In the setting of flank TTP, would also check UA to eval for kidney stones  - Would consider stress TTE outpatient if enzymes negative, as cannot r/o that this chest pain isn't anginal, and pt reports good exercise tolerance.

## 2018-03-10 NOTE — H&P ADULT - NSHPREVIEWOFSYSTEMS_GEN_ALL_CORE
Gen: negative for fevers, chills, malaise, fatigue  Eyes: no blurred vision  ENT: no tinnitus or vertigo  Resp: + dyspnea, intermittent cough. No wheezing, pleuritic chest pain, hemoptysis, or orthopnea  CV: + chest pain. No dyspnea on exertion or palpitations  GI: no nausea, vomiting, abdominal pain, diarrhea, constipation, melena, or hematochezia  : no dysuria or hematuria  MSK: + back pain. No arthralgias, joint stiffness, joint swelling, or myalgias  Neuro: no focal deficits, confusion, weakness, dizziness, tremors, or seizures  Skin: no rash, lesions, or edema

## 2018-03-10 NOTE — H&P ADULT - PROBLEM SELECTOR PLAN 4
- Chest pain may be partially from PUD  - Patient has - Chest pain may be partially from PUD  - Patient is continuing to eat spicy foods - With marked splenomegaly and LUQ TTP likely from PV  - Patient had declined splenectomy in the past  - Not on hydroxyurea  - Will continue to monitor

## 2018-03-10 NOTE — ED PROVIDER NOTE - PROGRESS NOTE DETAILS
Signout endorsed to me, Patient to be admitted to hospital for chest pain, no acute intervention now that CTA chest and abd unremarkable. VSS and WNL.  - Jignesh Villaseñor MD

## 2018-03-10 NOTE — CONSULT NOTE ADULT - ATTENDING COMMENTS
ESRD on HD   Sent in from dialysis for CP radiating to back while on treatment  Never experienced that type of pain before  Ischemic workup in progress  No need for HD today  Plan for usual schedule

## 2018-03-10 NOTE — ED ADULT NURSE REASSESSMENT NOTE - NS ED NURSE REASSESS COMMENT FT1
pts CTA of chest completed, results pending. VS stable. resting comfortably. no current cp, sob, numbness, tingling, or weakness.  at bedside. safety maintained.

## 2018-03-10 NOTE — H&P ADULT - ASSESSMENT
56 yo F with a PMH polycythemia vera (off Hydroxyurea), ESRD on HD T/R/Sat (formerly on PD stopped 2/2 RP hematoma), PUD, and HTN who presents with chest pain, r/o ACS.

## 2018-03-10 NOTE — ED PROVIDER NOTE - OBJECTIVE STATEMENT
56yo F with CP sudden onset @ dialysis finished 2/3 hrs, chset pain midsternal radiates around to back, like someone'kicking her' in the chest, 'takes breathe away' +SOB no HA. no UE weakness/paresthesias. no nauasea/vomiting. fistula site had bleeding controlled with pressure. has had CP before but different starts in back and sometimes comes to front and happens after dialysis. no dizziness. per EMS BP initially elevated received 2 ASA and 1 NO pain from 8 to 2. has had extensive work up for isolated low back pain, not for CP, does not know of last stress.    PMH- HTN, hyperparathyroid, GERD  [facility]

## 2018-03-10 NOTE — ED ADULT NURSE NOTE - OBJECTIVE STATEMENT
57 y.o female pmh CKD-on dialysis with fistula to the left arm, anemia, and hypothyroidism brought in by EMS from dialysis center c/o chest pain that radiates to her mid back, pain made worse upon movement and better at rest. states the cp was present prior to dialysis and 2 hours into her dialysis the cp became worse, states the pain then was 8/10, resulting in the dialysis to be d/c with only 2 out of the 3 hours completed and EMS was then called. upon EMS arrival and upon assessment cp rated now 2/10. pt denies any sob, weakness, dizziness, numbness, tingling, diaphoresis, or jaw pain. VS stable. EKG completed upon her arrival. labs and line obtained, results pending.  at the bedside. call be;; within reach.

## 2018-03-10 NOTE — H&P ADULT - HISTORY OF PRESENT ILLNESS
58 yo F with a PMH polycythemia vera (off Hydroxyurea), ESRD on HD T/R/Sat (formerly on PD stopped 2/2 RP hematoma), PUD, and HTN who presents with chest pain. She was in dialysis when she suddenly developed an intense, 9/10, chest pressure "like someone was sitting on me." It radiated to her flanks. It lasted about an hour, but improved greatly with some Tylenol, and completely resolved (from a 4/10 in severity) after nitroglycerin. She also endorsed SOB at the time. She denied palpitations dizziness, fevers, or chills. She has noted a chronic intermittent NP cough that mostly occurs at night when she is lying down. She denies N/V but does note reflux, which she acknowledges is likely from her consistent intake of spicy foods. She denies dysuria or hematuria. No family history of heart disease. She has had the pain about once a week since starting HD, but it usually is in her back and radiates upward to her chest without SOB.    She was brought by EMS, who gave nitroglycerin. HD was stopped (had completed 2.5/3.25 hours).    In the ED, nephrology saw the patient.  ED VS: Temp 97.7F, HR 80, /70 (repeat 130/58), RR 17, O2 97% on RA. 58 yo F with a PMH polycythemia vera (off Hydroxyurea), ESRD on HD T/R/Sat (formerly on PD stopped 2/2 RP hematoma), PUD, and HTN who presents with chest pain. She was in dialysis when she suddenly developed an intense, 9/10, chest pressure "like someone was sitting on me." It radiated to her flanks. It lasted about an hour, but improved greatly with some Tylenol, and completely resolved (from a 4/10 in severity) after nitroglycerin. She also endorsed SOB at the time. She denied palpitations dizziness, fevers, or chills. She has noted a chronic intermittent NP cough that mostly occurs at night when she is lying down. She denies N/V but does note reflux, which she acknowledges is likely from her consistent intake of spicy foods. She denies dysuria or hematuria. No family history of heart disease. She has had the pain about once a week since starting HD, but it usually is in her back and radiates upward to her chest without SOB. Reports good functional status, able to ambulate many blocks.    She was brought by EMS, who gave nitroglycerin. HD had mostly finished. (had completed 2.5/3.25 hours). Reports still making urine.    In the ED, nephrology saw the patient.  ED VS: Temp 97.7F, HR 80, /70 (repeat 130/58), RR 17, O2 97% on RA.

## 2018-03-10 NOTE — H&P ADULT - PROBLEM SELECTOR PLAN 6
- DVT PPX: low risk, patient ambulatory, h/o bleed  - Diet: DASH  - Dispo: pending negative cardiac enzymes and r/o - C/w Lasix 60 QD

## 2018-03-10 NOTE — ED PROVIDER NOTE - NS ED ROS FT
ROS: +CP/SOB. +cough. no fever. no n/v/d/c. no abd pain. no rash. +bleeding. no urinary complaints. no weakness. no vision changes. no HA. +back pain. no extremity swelling/deformity. No change in mental status.

## 2018-03-10 NOTE — H&P ADULT - NSHPSOCIALHISTORY_GEN_ALL_CORE
Former smoker, 1/4 ppd x 25 years, stopped 1-2 years ago. Former drinker (weekly), no more. No drug use.

## 2018-03-10 NOTE — ED PROVIDER NOTE - MEDICAL DECISION MAKING DETAILS
56yo F with sudden onset severe CP during HD radiating into back. improved with NO, mild pain now. patient appears slightly uncomfortable but no pain, 'weak' appearing. unable to take BP in both arms due to fistula, concerning with pain radiating into back and elevated BP for dissection. unclear if has had cardiac w/u in past, on for chronic low back pain. high risk for ACS. will hold off on further ASA until CTA done. labs with trop/ck. TBA. will need rest of HD today

## 2018-03-11 LAB
ANION GAP SERPL CALC-SCNC: 16 MMOL/L — SIGNIFICANT CHANGE UP (ref 5–17)
ANION GAP SERPL CALC-SCNC: 18 MMOL/L — HIGH (ref 5–17)
ANION GAP SERPL CALC-SCNC: 19 MMOL/L — HIGH (ref 5–17)
APPEARANCE UR: CLEAR — SIGNIFICANT CHANGE UP
BILIRUB UR-MCNC: NEGATIVE — SIGNIFICANT CHANGE UP
BUN SERPL-MCNC: 23 MG/DL — SIGNIFICANT CHANGE UP (ref 7–23)
BUN SERPL-MCNC: 36 MG/DL — HIGH (ref 7–23)
BUN SERPL-MCNC: 46 MG/DL — HIGH (ref 7–23)
CALCIUM SERPL-MCNC: 8.3 MG/DL — LOW (ref 8.4–10.5)
CALCIUM SERPL-MCNC: 8.5 MG/DL — SIGNIFICANT CHANGE UP (ref 8.4–10.5)
CALCIUM SERPL-MCNC: 8.5 MG/DL — SIGNIFICANT CHANGE UP (ref 8.4–10.5)
CHLORIDE SERPL-SCNC: 93 MMOL/L — LOW (ref 96–108)
CHLORIDE SERPL-SCNC: 93 MMOL/L — LOW (ref 96–108)
CHLORIDE SERPL-SCNC: 96 MMOL/L — SIGNIFICANT CHANGE UP (ref 96–108)
CHOLEST SERPL-MCNC: 107 MG/DL — SIGNIFICANT CHANGE UP (ref 10–199)
CK MB CFR SERPL CALC: 1 NG/ML — SIGNIFICANT CHANGE UP (ref 0–3.8)
CK MB CFR SERPL CALC: 1 NG/ML — SIGNIFICANT CHANGE UP (ref 0–3.8)
CK SERPL-CCNC: 17 U/L — LOW (ref 25–170)
CK SERPL-CCNC: 18 U/L — LOW (ref 25–170)
CO2 SERPL-SCNC: 23 MMOL/L — SIGNIFICANT CHANGE UP (ref 22–31)
CO2 SERPL-SCNC: 23 MMOL/L — SIGNIFICANT CHANGE UP (ref 22–31)
CO2 SERPL-SCNC: 27 MMOL/L — SIGNIFICANT CHANGE UP (ref 22–31)
COLOR SPEC: SIGNIFICANT CHANGE UP
CREAT SERPL-MCNC: 4.93 MG/DL — HIGH (ref 0.5–1.3)
CREAT SERPL-MCNC: 6.52 MG/DL — HIGH (ref 0.5–1.3)
CREAT SERPL-MCNC: 7.27 MG/DL — HIGH (ref 0.5–1.3)
DIFF PNL FLD: NEGATIVE — SIGNIFICANT CHANGE UP
GLUCOSE BLDC GLUCOMTR-MCNC: 301 MG/DL — HIGH (ref 70–99)
GLUCOSE BLDC GLUCOMTR-MCNC: 88 MG/DL — SIGNIFICANT CHANGE UP (ref 70–99)
GLUCOSE SERPL-MCNC: 131 MG/DL — HIGH (ref 70–99)
GLUCOSE SERPL-MCNC: 136 MG/DL — HIGH (ref 70–99)
GLUCOSE SERPL-MCNC: 69 MG/DL — LOW (ref 70–99)
GLUCOSE UR QL: ABNORMAL
HBA1C BLD-MCNC: 4.3 % — SIGNIFICANT CHANGE UP (ref 4–5.6)
HCT VFR BLD CALC: 38.3 % — SIGNIFICANT CHANGE UP (ref 34.5–45)
HDLC SERPL-MCNC: 39 MG/DL — LOW (ref 40–125)
HGB BLD-MCNC: 10.4 G/DL — LOW (ref 11.5–15.5)
KETONES UR-MCNC: NEGATIVE — SIGNIFICANT CHANGE UP
LEUKOCYTE ESTERASE UR-ACNC: NEGATIVE — SIGNIFICANT CHANGE UP
LIPID PNL WITH DIRECT LDL SERPL: 55 MG/DL — SIGNIFICANT CHANGE UP
MAGNESIUM SERPL-MCNC: 2.4 MG/DL — SIGNIFICANT CHANGE UP (ref 1.6–2.6)
MAGNESIUM SERPL-MCNC: 2.6 MG/DL — SIGNIFICANT CHANGE UP (ref 1.6–2.6)
MCHC RBC-ENTMCNC: 21.8 PG — LOW (ref 27–34)
MCHC RBC-ENTMCNC: 27.2 GM/DL — LOW (ref 32–36)
MCV RBC AUTO: 80.3 FL — SIGNIFICANT CHANGE UP (ref 80–100)
NITRITE UR-MCNC: NEGATIVE — SIGNIFICANT CHANGE UP
PH UR: 8.5 — HIGH (ref 5–8)
PHOSPHATE SERPL-MCNC: 6 MG/DL — HIGH (ref 2.5–4.5)
PHOSPHATE SERPL-MCNC: 8 MG/DL — HIGH (ref 2.5–4.5)
PLATELET # BLD AUTO: 245 K/UL — SIGNIFICANT CHANGE UP (ref 150–400)
POTASSIUM SERPL-MCNC: 5.2 MMOL/L — SIGNIFICANT CHANGE UP (ref 3.5–5.3)
POTASSIUM SERPL-MCNC: 5.3 MMOL/L — SIGNIFICANT CHANGE UP (ref 3.5–5.3)
POTASSIUM SERPL-MCNC: 6.1 MMOL/L — HIGH (ref 3.5–5.3)
POTASSIUM SERPL-SCNC: 5.2 MMOL/L — SIGNIFICANT CHANGE UP (ref 3.5–5.3)
POTASSIUM SERPL-SCNC: 5.3 MMOL/L — SIGNIFICANT CHANGE UP (ref 3.5–5.3)
POTASSIUM SERPL-SCNC: 6.1 MMOL/L — HIGH (ref 3.5–5.3)
PROT UR-MCNC: 100 MG/DL
RBC # BLD: 4.77 M/UL — SIGNIFICANT CHANGE UP (ref 3.8–5.2)
RBC # FLD: 27.7 % — HIGH (ref 10.3–14.5)
SODIUM SERPL-SCNC: 134 MMOL/L — LOW (ref 135–145)
SODIUM SERPL-SCNC: 136 MMOL/L — SIGNIFICANT CHANGE UP (ref 135–145)
SODIUM SERPL-SCNC: 138 MMOL/L — SIGNIFICANT CHANGE UP (ref 135–145)
SP GR SPEC: 1.01 — SIGNIFICANT CHANGE UP (ref 1.01–1.02)
TOTAL CHOLESTEROL/HDL RATIO MEASUREMENT: 2.7 RATIO — LOW (ref 3.3–7.1)
TRIGL SERPL-MCNC: 64 MG/DL — SIGNIFICANT CHANGE UP (ref 10–149)
TROPONIN T SERPL-MCNC: <0.01 NG/ML — SIGNIFICANT CHANGE UP (ref 0–0.06)
TROPONIN T SERPL-MCNC: <0.01 NG/ML — SIGNIFICANT CHANGE UP (ref 0–0.06)
TSH SERPL-MCNC: 1.68 UIU/ML — SIGNIFICANT CHANGE UP (ref 0.27–4.2)
UROBILINOGEN FLD QL: NEGATIVE — SIGNIFICANT CHANGE UP
WBC # BLD: 10.04 K/UL — SIGNIFICANT CHANGE UP (ref 3.8–10.5)
WBC # FLD AUTO: 10.04 K/UL — SIGNIFICANT CHANGE UP (ref 3.8–10.5)

## 2018-03-11 PROCEDURE — 93010 ELECTROCARDIOGRAM REPORT: CPT

## 2018-03-11 PROCEDURE — 99233 SBSQ HOSP IP/OBS HIGH 50: CPT | Mod: GC

## 2018-03-11 PROCEDURE — 99222 1ST HOSP IP/OBS MODERATE 55: CPT | Mod: GC

## 2018-03-11 RX ORDER — DEXTROSE 50 % IN WATER 50 %
50 SYRINGE (ML) INTRAVENOUS ONCE
Qty: 0 | Refills: 0 | Status: COMPLETED | OUTPATIENT
Start: 2018-03-11 | End: 2018-03-11

## 2018-03-11 RX ORDER — INSULIN HUMAN 100 [IU]/ML
10 INJECTION, SOLUTION SUBCUTANEOUS ONCE
Qty: 0 | Refills: 0 | Status: COMPLETED | OUTPATIENT
Start: 2018-03-11 | End: 2018-03-11

## 2018-03-11 RX ORDER — CALCIUM GLUCONATE 100 MG/ML
2 VIAL (ML) INTRAVENOUS ONCE
Qty: 0 | Refills: 0 | Status: COMPLETED | OUTPATIENT
Start: 2018-03-11 | End: 2018-03-11

## 2018-03-11 RX ORDER — ASPIRIN/CALCIUM CARB/MAGNESIUM 324 MG
325 TABLET ORAL ONCE
Qty: 0 | Refills: 0 | Status: COMPLETED | OUTPATIENT
Start: 2018-03-11 | End: 2018-03-11

## 2018-03-11 RX ORDER — FUROSEMIDE 40 MG
40 TABLET ORAL ONCE
Qty: 0 | Refills: 0 | Status: COMPLETED | OUTPATIENT
Start: 2018-03-11 | End: 2018-03-11

## 2018-03-11 RX ADMIN — FAMOTIDINE 20 MILLIGRAM(S): 10 INJECTION INTRAVENOUS at 05:23

## 2018-03-11 RX ADMIN — Medication 1300 MILLIGRAM(S): at 13:49

## 2018-03-11 RX ADMIN — FAMOTIDINE 20 MILLIGRAM(S): 10 INJECTION INTRAVENOUS at 18:48

## 2018-03-11 RX ADMIN — Medication 40 MILLIGRAM(S): at 18:52

## 2018-03-11 RX ADMIN — Medication 50 MILLILITER(S): at 18:47

## 2018-03-11 RX ADMIN — Medication 325 MILLIGRAM(S): at 00:45

## 2018-03-11 RX ADMIN — Medication 1300 MILLIGRAM(S): at 05:23

## 2018-03-11 RX ADMIN — Medication 60 MILLIGRAM(S): at 05:23

## 2018-03-11 RX ADMIN — INSULIN HUMAN 10 UNIT(S): 100 INJECTION, SOLUTION SUBCUTANEOUS at 18:48

## 2018-03-11 RX ADMIN — Medication 400 GRAM(S): at 20:39

## 2018-03-11 NOTE — PROGRESS NOTE ADULT - SUBJECTIVE AND OBJECTIVE BOX
CHIEF COMPLAINT: Chest pain during dialysis    Interval Events: Patient is a 57 year old woman with history of polycythemia vera and ESRD secondary to chronic glomerulonephritis on HD T/R/Sat (formerly on PD stopped due to RP hematoma), PUD and HTN presented with chest pain during dialysis. Chest pain was intense, pressure like and resolved in an hour with Tylenol and Nitroglycerin. Patient had history of chest pain usually with dialysis.     No acute events overnight. She remained hemodynamically stable. She is seen and examined at the bedside this morning. Denies further chest pain, no respiratory distress. She denies nausea or vomit, just feels very weak.       OBJECTIVE:  Vital Signs Last 24 Hrs  T(C): 36.4 (11 Mar 2018 05:16), Max: 36.8 (10 Mar 2018 22:03)  T(F): 97.5 (11 Mar 2018 05:16), Max: 98.2 (10 Mar 2018 22:03)  HR: 61 (11 Mar 2018 05:16) (61 - 80)  BP: 131/79 (11 Mar 2018 05:16) (121/70 - 159/78)  BP(mean): --  RR: 18 (11 Mar 2018 05:16) (17 - 18)  SpO2: 96% (11 Mar 2018 05:16) (96% - 99%)    0310 @ 06: @ 07:00  --------------------------------------------------------  IN: 0 mL / OUT: 200 mL / NET: -200 mL     @ 07: @ 11:21  --------------------------------------------------------  IN: 240 mL / OUT: 0 mL / NET: 240 mL      CAPILLARY BLOOD GLUCOSE      POCT Blood Glucose.: 88 mg/dL (11 Mar 2018 08:29)      PHYSICAL EXAM:  General: Alert and cooperative. Not in acute stress. Well developed, well nourished.   Head: Normocephalic, no mass and lesions.  Eyes: PERRLA. EOMI.   Respiratory: Bilateral lung clear to auscultation, no crackles, no wheezes, no rhonchi.   Cardiovascular: S1/S2 auscultated, no murmur, or gallop. Rhythm is regular. There is no peripheral edema.  Abdomen: Soft, non-tender, nondistended, no guarding or rebound tenderness. Active bowel sounds in all 4 quadrants.   Extremities: No significant deformity or joint abnormality. No peripheral edema.   Neurological: AAOx3. No focal deficit. Ambulating.     HOSPITAL MEDICATIONS:      furosemide    Tablet 60 milliGRAM(s) Oral daily          calcium carbonate 500 mG (Tums) Chewable 1 Tablet(s) Chew daily PRN  famotidine    Tablet 20 milliGRAM(s) Oral two times a day        sodium bicarbonate 1300 milliGRAM(s) Oral three times a day            LABS:                        10.4   10. )-----------( 245      ( 11 Mar 2018 09:35 )             38.3     Hgb Trend: 10.4<--, 12.3<--      136  |  93<L>  |  23  ----------------------------<  69<L>  5.2   |  27  |  4.93<H>    Ca    8.5      11 Mar 2018 07:20  Phos  6.0       Mg     2.4         TPro  8.8<H>  /  Alb  4.4  /  TBili  1.5<H>  /  DBili  x   /  AST  20  /  ALT  5<L>  /  AlkPhos  148<H>  03-10    Creatinine Trend: 4.93<--, 3.29<--  PT/INR - ( 10 Mar 2018 15:52 )   PT: 12.3 sec;   INR: 1.13 ratio         PTT - ( 10 Mar 2018 15:52 )  PTT:33.5 sec  Urinalysis Basic - ( 11 Mar 2018 06:38 )    Color: PL Yellow / Appearance: Clear / S.015 / pH: x  Gluc: x / Ketone: Negative  / Bili: Negative / Urobili: Negative   Blood: x / Protein: 100 mg/dL / Nitrite: Negative   Leuk Esterase: Negative / RBC: 0-2 /HPF / WBC 3-5 /HPF   Sq Epi: x / Non Sq Epi: OCC /HPF / Bacteria: Few /HPF        Venous Blood Gas:  03-10 @ 15:52  7.48/43/53/32/85  VBG Lactate: 1.6      RADIOLOGY:  < from: CT Angio Abdomen and Pelvis w/ IV Cont (03.10.18 @ 17:49) >  FINDINGS:    CHEST:     LUNGS AND LARGE AIRWAYS: Patent central airways. No pulmonary nodules.  PLEURA: No pleural effusion.  VESSELS: No evidence of aortic dissection or intramural hematoma.  HEART: Mild cardiomegaly. No pericardial effusion.  MEDIASTINUM AND DAVION: No lymphadenopathy.  CHEST WALL AND LOWER NECK: Within normal limits.    ABDOMEN AND PELVIS:    LIVER: Caudate hypertrophy and medial segment atrophy which may be seen   in early cirrhosis.  BILE DUCTS: Normal caliber.  GALLBLADDER: Within normal limits.  SPLEEN: Marked splenomegaly.  PANCREAS: Within normal limits.  ADRENALS: Within normal limits.  KIDNEYS/URETERS: Atrophic kidneys.    BLADDER: Within normal limits.  REPRODUCTIVE ORGANS: The uterus and adnexa are within normal limits.    BOWEL:No bowel obstruction. Appendix is normal.  PERITONEUM: No ascites. A previously characterized hematoma in the left   upper quadrant measures 2.7 x 2.1 cm, previously 5.5 x 5.0 cm.  VESSELS: Within normal limits.  RETROPERITONEUM: No lymphadenopathy.  ABDOMINAL WALL: Within normal limits.  BONES: Mild degenerative changes of the spine.    IMPRESSION:     No evidence of aortic dissection.    Marked splenomegaly with questionable early cirrhosis.    Left upper quadrant hematoma decreased in size or prior imaging   10/11/2017.      < end of copied text >

## 2018-03-12 DIAGNOSIS — E87.5 HYPERKALEMIA: ICD-10-CM

## 2018-03-12 LAB
ALBUMIN SERPL ELPH-MCNC: 3.6 G/DL — SIGNIFICANT CHANGE UP (ref 3.3–5)
ALP SERPL-CCNC: 149 U/L — HIGH (ref 40–120)
ALT FLD-CCNC: 6 U/L RC — LOW (ref 10–45)
ANA TITR SER: NEGATIVE — SIGNIFICANT CHANGE UP
ANION GAP SERPL CALC-SCNC: 17 MMOL/L — SIGNIFICANT CHANGE UP (ref 5–17)
ANION GAP SERPL CALC-SCNC: 24 MMOL/L — HIGH (ref 5–17)
AST SERPL-CCNC: 13 U/L — SIGNIFICANT CHANGE UP (ref 10–40)
BASOPHILS # BLD AUTO: 0.07 K/UL — SIGNIFICANT CHANGE UP (ref 0–0.2)
BASOPHILS NFR BLD AUTO: 0.7 % — SIGNIFICANT CHANGE UP (ref 0–2)
BILIRUB SERPL-MCNC: 0.8 MG/DL — SIGNIFICANT CHANGE UP (ref 0.2–1.2)
BUN SERPL-MCNC: 48 MG/DL — HIGH (ref 7–23)
BUN SERPL-MCNC: 50 MG/DL — HIGH (ref 7–23)
CALCIUM SERPL-MCNC: 8.4 MG/DL — SIGNIFICANT CHANGE UP (ref 8.4–10.5)
CALCIUM SERPL-MCNC: 8.5 MG/DL — SIGNIFICANT CHANGE UP (ref 8.4–10.5)
CHLORIDE SERPL-SCNC: 96 MMOL/L — SIGNIFICANT CHANGE UP (ref 96–108)
CHLORIDE SERPL-SCNC: 98 MMOL/L — SIGNIFICANT CHANGE UP (ref 96–108)
CO2 SERPL-SCNC: 16 MMOL/L — LOW (ref 22–31)
CO2 SERPL-SCNC: 24 MMOL/L — SIGNIFICANT CHANGE UP (ref 22–31)
CREAT SERPL-MCNC: 7.37 MG/DL — HIGH (ref 0.5–1.3)
CREAT SERPL-MCNC: 7.7 MG/DL — HIGH (ref 0.5–1.3)
EOSINOPHIL # BLD AUTO: 0.27 K/UL — SIGNIFICANT CHANGE UP (ref 0–0.5)
EOSINOPHIL NFR BLD AUTO: 2.5 % — SIGNIFICANT CHANGE UP (ref 0–6)
GLUCOSE BLDC GLUCOMTR-MCNC: 121 MG/DL — HIGH (ref 70–99)
GLUCOSE BLDC GLUCOMTR-MCNC: 202 MG/DL — HIGH (ref 70–99)
GLUCOSE BLDC GLUCOMTR-MCNC: 377 MG/DL — HIGH (ref 70–99)
GLUCOSE SERPL-MCNC: 117 MG/DL — HIGH (ref 70–99)
GLUCOSE SERPL-MCNC: 81 MG/DL — SIGNIFICANT CHANGE UP (ref 70–99)
HCT VFR BLD CALC: 37.7 % — SIGNIFICANT CHANGE UP (ref 34.5–45)
HGB BLD-MCNC: 10.6 G/DL — LOW (ref 11.5–15.5)
IMM GRANULOCYTES NFR BLD AUTO: 0.3 % — SIGNIFICANT CHANGE UP (ref 0–1.5)
LKM AB SER-ACNC: 0.4 UNITS — SIGNIFICANT CHANGE UP (ref 0–20)
LYMPHOCYTES # BLD AUTO: 1.26 K/UL — SIGNIFICANT CHANGE UP (ref 1–3.3)
LYMPHOCYTES # BLD AUTO: 11.8 % — LOW (ref 13–44)
MAGNESIUM SERPL-MCNC: 2.6 MG/DL — SIGNIFICANT CHANGE UP (ref 1.6–2.6)
MCHC RBC-ENTMCNC: 23 PG — LOW (ref 27–34)
MCHC RBC-ENTMCNC: 28.1 GM/DL — LOW (ref 32–36)
MCV RBC AUTO: 81.8 FL — SIGNIFICANT CHANGE UP (ref 80–100)
MONOCYTES # BLD AUTO: 0.29 K/UL — SIGNIFICANT CHANGE UP (ref 0–0.9)
MONOCYTES NFR BLD AUTO: 2.7 % — SIGNIFICANT CHANGE UP (ref 2–14)
NEUTROPHILS # BLD AUTO: 8.8 K/UL — HIGH (ref 1.8–7.4)
NEUTROPHILS NFR BLD AUTO: 82 % — HIGH (ref 43–77)
PHOSPHATE SERPL-MCNC: 7 MG/DL — HIGH (ref 2.5–4.5)
PLATELET # BLD AUTO: 246 K/UL — SIGNIFICANT CHANGE UP (ref 150–400)
POTASSIUM SERPL-MCNC: 4.7 MMOL/L — SIGNIFICANT CHANGE UP (ref 3.5–5.3)
POTASSIUM SERPL-MCNC: 5.5 MMOL/L — HIGH (ref 3.5–5.3)
POTASSIUM SERPL-SCNC: 4.7 MMOL/L — SIGNIFICANT CHANGE UP (ref 3.5–5.3)
POTASSIUM SERPL-SCNC: 5.5 MMOL/L — HIGH (ref 3.5–5.3)
PROT SERPL-MCNC: 7.8 G/DL — SIGNIFICANT CHANGE UP (ref 6–8.3)
RBC # BLD: 4.61 M/UL — SIGNIFICANT CHANGE UP (ref 3.8–5.2)
RBC # FLD: 27.3 % — HIGH (ref 10.3–14.5)
SODIUM SERPL-SCNC: 137 MMOL/L — SIGNIFICANT CHANGE UP (ref 135–145)
SODIUM SERPL-SCNC: 138 MMOL/L — SIGNIFICANT CHANGE UP (ref 135–145)
WBC # BLD: 10.72 K/UL — HIGH (ref 3.8–10.5)
WBC # FLD AUTO: 10.72 K/UL — HIGH (ref 3.8–10.5)

## 2018-03-12 PROCEDURE — 93306 TTE W/DOPPLER COMPLETE: CPT | Mod: 26

## 2018-03-12 PROCEDURE — 99233 SBSQ HOSP IP/OBS HIGH 50: CPT | Mod: GC

## 2018-03-12 RX ORDER — SODIUM POLYSTYRENE SULFONATE 4.1 MEQ/G
30 POWDER, FOR SUSPENSION ORAL ONCE
Qty: 0 | Refills: 0 | Status: COMPLETED | OUTPATIENT
Start: 2018-03-12 | End: 2018-03-12

## 2018-03-12 RX ORDER — ALBUTEROL 90 UG/1
2.5 AEROSOL, METERED ORAL ONCE
Qty: 0 | Refills: 0 | Status: COMPLETED | OUTPATIENT
Start: 2018-03-12 | End: 2018-03-12

## 2018-03-12 RX ORDER — CALCIUM GLUCONATE 100 MG/ML
1 VIAL (ML) INTRAVENOUS ONCE
Qty: 0 | Refills: 0 | Status: COMPLETED | OUTPATIENT
Start: 2018-03-12 | End: 2018-03-12

## 2018-03-12 RX ORDER — DEXTROSE 50 % IN WATER 50 %
25 SYRINGE (ML) INTRAVENOUS ONCE
Qty: 0 | Refills: 0 | Status: COMPLETED | OUTPATIENT
Start: 2018-03-12 | End: 2018-03-12

## 2018-03-12 RX ORDER — INSULIN HUMAN 100 [IU]/ML
10 INJECTION, SOLUTION SUBCUTANEOUS ONCE
Qty: 0 | Refills: 0 | Status: COMPLETED | OUTPATIENT
Start: 2018-03-12 | End: 2018-03-12

## 2018-03-12 RX ORDER — HEPARIN SODIUM 5000 [USP'U]/ML
5000 INJECTION INTRAVENOUS; SUBCUTANEOUS EVERY 8 HOURS
Qty: 0 | Refills: 0 | Status: DISCONTINUED | OUTPATIENT
Start: 2018-03-12 | End: 2018-03-16

## 2018-03-12 RX ORDER — INSULIN HUMAN 100 [IU]/ML
10 INJECTION, SOLUTION SUBCUTANEOUS ONCE
Qty: 0 | Refills: 0 | Status: DISCONTINUED | OUTPATIENT
Start: 2018-03-12 | End: 2018-03-12

## 2018-03-12 RX ADMIN — Medication 25 GRAM(S): at 10:32

## 2018-03-12 RX ADMIN — FAMOTIDINE 20 MILLIGRAM(S): 10 INJECTION INTRAVENOUS at 19:01

## 2018-03-12 RX ADMIN — HEPARIN SODIUM 5000 UNIT(S): 5000 INJECTION INTRAVENOUS; SUBCUTANEOUS at 14:08

## 2018-03-12 RX ADMIN — Medication 1300 MILLIGRAM(S): at 14:09

## 2018-03-12 RX ADMIN — Medication 1300 MILLIGRAM(S): at 22:00

## 2018-03-12 RX ADMIN — Medication 1300 MILLIGRAM(S): at 05:16

## 2018-03-12 RX ADMIN — HEPARIN SODIUM 5000 UNIT(S): 5000 INJECTION INTRAVENOUS; SUBCUTANEOUS at 22:00

## 2018-03-12 RX ADMIN — SODIUM POLYSTYRENE SULFONATE 30 GRAM(S): 4.1 POWDER, FOR SUSPENSION ORAL at 10:29

## 2018-03-12 RX ADMIN — INSULIN HUMAN 10 UNIT(S): 100 INJECTION, SOLUTION SUBCUTANEOUS at 10:31

## 2018-03-12 RX ADMIN — FAMOTIDINE 20 MILLIGRAM(S): 10 INJECTION INTRAVENOUS at 05:17

## 2018-03-12 RX ADMIN — ALBUTEROL 2.5 MILLIGRAM(S): 90 AEROSOL, METERED ORAL at 10:46

## 2018-03-12 RX ADMIN — Medication 60 MILLIGRAM(S): at 05:17

## 2018-03-12 RX ADMIN — Medication 200 GRAM(S): at 10:54

## 2018-03-12 NOTE — PROGRESS NOTE ADULT - SUBJECTIVE AND OBJECTIVE BOX
CHIEF COMPLAINT: Chest pain during dialysis    Interval Events: Patient was found to have peaked T waves on EKG when she was preparing for the stress test. STAT BMP was ordered was found to have K of 6.1, improved to 5.3 with Insulin, Dextrose and Lasix 40 mg IVP. Patient was asymptomatic at that point. Otherwise no acute events overnight. She remained hemodynamically stable. She is seen and examined at the bedside this morning. She denies chest pain, no respiratory distress. She denies nausea or vomit. She said her potassium has been very labile in the past.     OBJECTIVE:  Vital Signs Last 24 Hrs  T(C): 36.8 (12 Mar 2018 08:43), Max: 36.8 (12 Mar 2018 08:43)  T(F): 98.2 (12 Mar 2018 08:43), Max: 98.2 (12 Mar 2018 08:43)  HR: 88 (12 Mar 2018 08:43) (70 - 94)  BP: 120/68 (12 Mar 2018 08:43) (118/69 - 138/79)  BP(mean): --  RR: 18 (12 Mar 2018 08:43) (17 - 18)  SpO2: 96% (12 Mar 2018 08:43) (95% - 96%)    03-11 @ 07:01  -  03-12 @ 07:00  --------------------------------------------------------  IN: 580 mL / OUT: 400 mL / NET: 180 mL      CAPILLARY BLOOD GLUCOSE      POCT Blood Glucose.: 301 mg/dL (11 Mar 2018 19:03)      PHYSICAL EXAM:  General: Alert and cooperative. Not in acute stress. Well developed, well nourished.   Head: Normocephalic, no mass and lesions.  Eyes: PERRLA. EOMI.   Respiratory: Bilateral lung clear to auscultation, no crackles, no wheezes, no rhonchi.   Cardiovascular: S1/S2 auscultated, no murmur, or gallop. Rhythm is regular. There is no peripheral edema.  Abdomen: Soft, non-tender, nondistended, no guarding or rebound tenderness. Active bowel sounds in all 4 quadrants.   Extremities: No significant deformity or joint abnormality. No peripheral edema.   Neurological: AAOx3. No focal deficit. Ambulating.     HOSPITAL MEDICATIONS:      furosemide    Tablet 60 milliGRAM(s) Oral daily    dextrose 50% Injectable 25 Gram(s) IV Push once  insulin regular  human recombinant. 10 Unit(s) SubCutaneous once    ALBUTerol    0.083%. 2.5 milliGRAM(s) Nebulizer once      calcium carbonate 500 mG (Tums) Chewable 1 Tablet(s) Chew daily PRN  famotidine    Tablet 20 milliGRAM(s) Oral two times a day        calcium gluconate IVPB 1 Gram(s) IV Intermittent once  sodium bicarbonate 1300 milliGRAM(s) Oral three times a day        sodium polystyrene sulfonate Suspension 30 Gram(s) Oral once      LABS:                        10.4   10.04 )-----------( 245      ( 11 Mar 2018 09:35 )             38.3     Hgb Trend: 10.4<--, 12.3<--  03-12    138  |  98  |  50<H>  ----------------------------<  81  5.5<H>   |  16<L>  |  7.37<H>    Ca    8.4      12 Mar 2018 07:08  Phos  7.0     03-12  Mg     2.6     03-12    TPro  7.8  /  Alb  3.6  /  TBili  0.8  /  DBili  x   /  AST  13  /  ALT  6<L>  /  AlkPhos  149<H>  -12    Creatinine Trend: 7.37<--, 7.27<--, 6.52<--, 4.93<--, 3.29<--  PT/INR - ( 10 Mar 2018 15:52 )   PT: 12.3 sec;   INR: 1.13 ratio         PTT - ( 10 Mar 2018 15:52 )  PTT:33.5 sec  Urinalysis Basic - ( 11 Mar 2018 06:38 )    Color: PL Yellow / Appearance: Clear / S.015 / pH: x  Gluc: x / Ketone: Negative  / Bili: Negative / Urobili: Negative   Blood: x / Protein: 100 mg/dL / Nitrite: Negative   Leuk Esterase: Negative / RBC: 0-2 /HPF / WBC 3-5 /HPF   Sq Epi: x / Non Sq Epi: OCC /HPF / Bacteria: Few /HPF        Venous Blood Gas:  03-10 @ 15:52  7.48/43/53/32/85  VBG Lactate: 1.6    RADIOLOGY:  < from: CT Angio Abdomen and Pelvis w/ IV Cont (03.10.18 @ 17:49) >  FINDINGS:    CHEST:     LUNGS AND LARGE AIRWAYS: Patent central airways. No pulmonary nodules.  PLEURA: No pleural effusion.  VESSELS: No evidence of aortic dissection or intramural hematoma.  HEART: Mild cardiomegaly. No pericardial effusion.  MEDIASTINUM AND DAVION: No lymphadenopathy.  CHEST WALL AND LOWER NECK: Within normal limits.    ABDOMEN AND PELVIS:    LIVER: Caudate hypertrophy and medial segment atrophy which may be seen   in early cirrhosis.  BILE DUCTS: Normal caliber.  GALLBLADDER: Within normal limits.  SPLEEN: Marked splenomegaly.  PANCREAS: Within normal limits.  ADRENALS: Within normal limits.  KIDNEYS/URETERS: Atrophic kidneys.    BLADDER: Within normal limits.  REPRODUCTIVE ORGANS: The uterus and adnexa are within normal limits.    BOWEL:No bowel obstruction. Appendix is normal.  PERITONEUM: No ascites. A previously characterized hematoma in the left   upper quadrant measures 2.7 x 2.1 cm, previously 5.5 x 5.0 cm.  VESSELS: Within normal limits.  RETROPERITONEUM: No lymphadenopathy.  ABDOMINAL WALL: Within normal limits.  BONES: Mild degenerative changes of the spine.    IMPRESSION:     No evidence of aortic dissection.    Marked splenomegaly with questionable early cirrhosis.    Left upper quadrant hematoma decreased in size or prior imaging   10/11/2017.      < end of copied text >

## 2018-03-13 ENCOUNTER — TRANSCRIPTION ENCOUNTER (OUTPATIENT)
Age: 58
End: 2018-03-13

## 2018-03-13 DIAGNOSIS — K74.60 UNSPECIFIED CIRRHOSIS OF LIVER: ICD-10-CM

## 2018-03-13 LAB
ANION GAP SERPL CALC-SCNC: 20 MMOL/L — HIGH (ref 5–17)
BUN SERPL-MCNC: 53 MG/DL — HIGH (ref 7–23)
CALCIUM SERPL-MCNC: 8.7 MG/DL — SIGNIFICANT CHANGE UP (ref 8.4–10.5)
CHLORIDE SERPL-SCNC: 97 MMOL/L — SIGNIFICANT CHANGE UP (ref 96–108)
CO2 SERPL-SCNC: 21 MMOL/L — LOW (ref 22–31)
CREAT SERPL-MCNC: 7.93 MG/DL — HIGH (ref 0.5–1.3)
GLUCOSE SERPL-MCNC: 86 MG/DL — SIGNIFICANT CHANGE UP (ref 70–99)
HBV CORE AB SER-ACNC: SIGNIFICANT CHANGE UP
HBV CORE IGM SER-ACNC: SIGNIFICANT CHANGE UP
HBV SURFACE AB SER-ACNC: <3 MIU/ML — LOW
HBV SURFACE AG SER-ACNC: SIGNIFICANT CHANGE UP
MAGNESIUM SERPL-MCNC: 2.6 MG/DL — SIGNIFICANT CHANGE UP (ref 1.6–2.6)
PHOSPHATE SERPL-MCNC: 7.4 MG/DL — HIGH (ref 2.5–4.5)
POTASSIUM SERPL-MCNC: 4.2 MMOL/L — SIGNIFICANT CHANGE UP (ref 3.5–5.3)
POTASSIUM SERPL-SCNC: 4.2 MMOL/L — SIGNIFICANT CHANGE UP (ref 3.5–5.3)
SODIUM SERPL-SCNC: 138 MMOL/L — SIGNIFICANT CHANGE UP (ref 135–145)

## 2018-03-13 PROCEDURE — 99233 SBSQ HOSP IP/OBS HIGH 50: CPT | Mod: GC

## 2018-03-13 RX ORDER — BENZOCAINE AND MENTHOL 5; 1 G/100ML; G/100ML
1 LIQUID ORAL
Qty: 0 | Refills: 0 | Status: DISCONTINUED | OUTPATIENT
Start: 2018-03-13 | End: 2018-03-15

## 2018-03-13 RX ORDER — DOXERCALCIFEROL 2.5 UG/1
1 CAPSULE ORAL ONCE
Qty: 0 | Refills: 0 | Status: COMPLETED | OUTPATIENT
Start: 2018-03-13 | End: 2018-03-13

## 2018-03-13 RX ORDER — ERYTHROPOIETIN 10000 [IU]/ML
5000 INJECTION, SOLUTION INTRAVENOUS; SUBCUTANEOUS ONCE
Qty: 0 | Refills: 0 | Status: COMPLETED | OUTPATIENT
Start: 2018-03-13 | End: 2018-03-13

## 2018-03-13 RX ADMIN — HEPARIN SODIUM 5000 UNIT(S): 5000 INJECTION INTRAVENOUS; SUBCUTANEOUS at 21:14

## 2018-03-13 RX ADMIN — HEPARIN SODIUM 5000 UNIT(S): 5000 INJECTION INTRAVENOUS; SUBCUTANEOUS at 05:15

## 2018-03-13 RX ADMIN — Medication 1300 MILLIGRAM(S): at 21:14

## 2018-03-13 RX ADMIN — FAMOTIDINE 20 MILLIGRAM(S): 10 INJECTION INTRAVENOUS at 18:48

## 2018-03-13 RX ADMIN — Medication 1300 MILLIGRAM(S): at 05:15

## 2018-03-13 RX ADMIN — HEPARIN SODIUM 5000 UNIT(S): 5000 INJECTION INTRAVENOUS; SUBCUTANEOUS at 14:08

## 2018-03-13 RX ADMIN — FAMOTIDINE 20 MILLIGRAM(S): 10 INJECTION INTRAVENOUS at 05:15

## 2018-03-13 RX ADMIN — Medication 60 MILLIGRAM(S): at 05:15

## 2018-03-13 RX ADMIN — ERYTHROPOIETIN 5000 UNIT(S): 10000 INJECTION, SOLUTION INTRAVENOUS; SUBCUTANEOUS at 12:06

## 2018-03-13 RX ADMIN — Medication 1300 MILLIGRAM(S): at 14:09

## 2018-03-13 NOTE — DISCHARGE NOTE ADULT - OTHER SIGNIFICANT FINDINGS
< from: CT Angio Abdomen and Pelvis w/ IV Cont (03.10.18 @ 17:49) >  FINDINGS:    CHEST:     LUNGS AND LARGE AIRWAYS: Patent central airways. No pulmonary nodules.  PLEURA: No pleural effusion.  VESSELS: No evidence of aortic dissection or intramural hematoma.  HEART: Mild cardiomegaly. No pericardial effusion.  MEDIASTINUM AND DAVION: No lymphadenopathy.  CHEST WALL AND LOWER NECK: Within normal limits.    ABDOMEN AND PELVIS:    LIVER: Caudate hypertrophy and medial segment atrophy which may be seen   in early cirrhosis.  BILE DUCTS: Normal caliber.  GALLBLADDER: Within normal limits.  SPLEEN: Marked splenomegaly.  PANCREAS: Within normal limits.  ADRENALS: Within normal limits.  KIDNEYS/URETERS: Atrophic kidneys.    BLADDER: Within normal limits.  REPRODUCTIVE ORGANS: The uterus and adnexa are within normal limits.    BOWEL:No bowel obstruction. Appendix is normal.  PERITONEUM: No ascites. A previously characterized hematoma in the left   upper quadrant measures 2.7 x 2.1 cm, previously 5.5 x 5.0 cm.  VESSELS: Within normal limits.  RETROPERITONEUM: No lymphadenopathy.  ABDOMINAL WALL: Within normal limits.  BONES: Mild degenerative changes of the spine.    IMPRESSION:     No evidence of aortic dissection.    Marked splenomegaly with questionable early cirrhosis.    Left upper quadrant hematoma decreased in size or prior imaging   10/11/2017.    < end of copied text >    < from: Nuclear Stress Test-Pharmacologic (03.14.18 @ 16:25) >  IMPRESSIONS:Normal Study  * Chest Pain: No chest pain with administration of  Regadenoson.  * Symptom: Shortness of breath,abd pain.  * HR Response: Appropriate.  * BP Response: Appropriate.  * Heart Rhythm: Normal Sinus Rhythm - 86 BPM.  * Baseline ECG:Nonspecific ST-T wave abnormality.  * ECG Abnormalities: None.  * ECG Changes: No ischemic ST segment changes beyond  baseline abnormalities.  * Arrhythmia: None.  * Review of raw data shows: The study is of good technical  quality.  * The left ventricle was normal in size. Normal myocardial  perfusion scan, with no evidence of infarction or  inducible ischemia.  * Post-stress gated wall motion analysis was performed  (LVEF = 61 %;LVEDV = 68 ml.), revealing normal LV  function.    < end of copied text >

## 2018-03-13 NOTE — DISCHARGE NOTE ADULT - MEDICATION SUMMARY - MEDICATIONS TO TAKE
I will START or STAY ON the medications listed below when I get home from the hospital:    Tums 500 mg oral tablet, chewable  -- 1 tab(s) by mouth once a day, As Needed  -- Indication: For Acid refluex    furosemide 20 mg oral tablet  -- 3 tab(s) by mouth once a day  -- Indication: For HTN (hypertension)    famotidine 20 mg oral tablet  -- 1 tab(s) by mouth 2 times a day  -- Indication: For Acid reflux

## 2018-03-13 NOTE — PROGRESS NOTE ADULT - SUBJECTIVE AND OBJECTIVE BOX
[facility] DIVISION OF KIDNEY DISEASES AND HYPERTENSION -- HEMODIALYSIS NOTE  --------------------------------------------------------------------------------  Chief Complaint: ESRD/Ongoing hemodialysis requirement    24 hour events/subjective:        PAST HISTORY  --------------------------------------------------------------------------------  No significant changes to PMH, PSH, FHx, SHx, unless otherwise noted    ALLERGIES & MEDICATIONS  --------------------------------------------------------------------------------  Allergies    No Known Allergies    Intolerances      Standing Inpatient Medications  famotidine    Tablet 20 milliGRAM(s) Oral two times a day  furosemide    Tablet 60 milliGRAM(s) Oral daily  heparin  Injectable 5000 Unit(s) SubCutaneous every 8 hours  sodium bicarbonate 1300 milliGRAM(s) Oral three times a day    PRN Inpatient Medications  benzocaine 15 mG/menthol 3.6 mG Lozenge 1 Lozenge Oral four times a day PRN  calcium carbonate 500 mG (Tums) Chewable 1 Tablet(s) Chew daily PRN      REVIEW OF SYSTEMS  --------------------------------------------------------------------------------  Gen: No weight changes, fatigue, fevers/chills, weakness  Skin: No rashes  Head/Eyes/Ears/Mouth: No headache; Normal hearing; Normal vision w/o blurriness; No sinus pain/discomfort, sore throat  Respiratory: No dyspnea, cough, wheezing, hemoptysis  CV: No chest pain, PND, orthopnea  GI: No abdominal pain, diarrhea, constipation, nausea, vomiting, melena, hematochezia  : No increased frequency, dysuria, hematuria, nocturia  MSK: No joint pain/swelling; no back pain; no edema  Neuro: No dizziness/lightheadedness, weakness, seizures, numbness, tingling  Heme: No easy bruising or bleeding  Endo: No heat/cold intolerance  Psych: No significant nervousness, anxiety, stress, depression    All other systems were reviewed and are negative, except as noted.    VITALS/PHYSICAL EXAM  --------------------------------------------------------------------------------  T(C): 37 (03-13-18 @ 08:45), Max: 37 (03-13-18 @ 08:45)  HR: 90 (03-13-18 @ 08:45) (86 - 90)  BP: 143/90 (03-13-18 @ 08:45) (131/81 - 156/90)  RR: 18 (03-13-18 @ 08:45) (18 - 18)  SpO2: 97% (03-13-18 @ 08:45) (97% - 98%)  Wt(kg): --  Height (cm): 157.48 (03-12-18 @ 08:24)  Weight (kg): 53.2 (03-12-18 @ 08:24)  BMI (kg/m2): 21.5 (03-12-18 @ 08:24)  BSA (m2): 1.52 (03-12-18 @ 08:24)      03-12-18 @ 07:01  -  03-13-18 @ 07:00  --------------------------------------------------------  IN: 1100 mL / OUT: 950 mL / NET: 150 mL      Physical Exam:  	Gen: NAD, well-appearing  	HEENT: PERRL, supple neck, clear oropharynx  	Pulm: CTA B/L  	CV: RRR, S1S2; no rub  	Back: No spinal or CVA tenderness; no sacral edema  	Abd: +BS, soft, nontender/nondistended  	: No suprapubic tenderness  	UE: Warm, FROM, no clubbing, intact strength; no edema; no asterixis  	LE: Warm, FROM, no clubbing, intact strength; no edema  	Neuro: No focal deficits, intact gait  	Psych: Normal affect and mood  	Skin: Warm, without rashes  	Vascular access:    LABS/STUDIES  --------------------------------------------------------------------------------              10.6   10.72 >-----------<  246      [03-12-18 @ 09:09]              37.7     138  |  97  |  53  ----------------------------<  86      [03-13-18 @ 05:56]  4.2   |  21  |  7.93        Ca     8.7     [03-13-18 @ 05:56]      Mg     2.6     [03-13-18 @ 05:56]      Phos  7.4     [03-13-18 @ 05:56]    TPro  7.8  /  Alb  3.6  /  TBili  0.8  /  DBili  x   /  AST  13  /  ALT  6   /  AlkPhos  149  [03-12-18 @ 07:08]          HbA1c 4.3      [03-11-18 @ 09:35]  TSH 1.68      [03-11-18 @ 09:33]  Lipid: chol 107, TG 64, HDL 39, LDL 55      [03-11-18 @ 09:47]

## 2018-03-13 NOTE — DISCHARGE NOTE ADULT - MEDICATION SUMMARY - MEDICATIONS TO STOP TAKING
I will STOP taking the medications listed below when I get home from the hospital:    sodium bicarbonate 650 mg oral tablet  -- 2 tab(s) by mouth 3 times a day

## 2018-03-13 NOTE — DISCHARGE NOTE ADULT - PATIENT PORTAL LINK FT
You can access the [facility] Patient Portal, offered by [facility], by registering with the following website: [email or web address]

## 2018-03-13 NOTE — DISCHARGE NOTE ADULT - CARE PROVIDERS DIRECT ADDRESSES
[email or web address]

## 2018-03-13 NOTE — DISCHARGE NOTE ADULT - CARE PLAN
Principal Discharge DX:	Chest pain  Goal:	Chest pain free  Assessment and plan of treatment:	You presented to the hospital with chest pain after dialysis, likely in the setting of fluid shift during dialysis. You were found to have negative cardiac enzyme and echocardiogram of the heart shows normal heart structure.  Secondary Diagnosis:	ESRD (end stage renal disease) on dialysis Principal Discharge DX:	Chest pain  Goal:	Chest pain free  Assessment and plan of treatment:	You presented to the hospital with chest pain after dialysis, likely in the setting of fluid shift during dialysis. You were found to have negative cardiac enzyme and echocardiogram of the heart shows normal heart structure. You also underwent a stress test which was normal, please see the detailed report below. Your chest comfort could also be related to acid reflux, which you were started on Famotidine.  Secondary Diagnosis:	ESRD (end stage renal disease) on dialysis  Assessment and plan of treatment:	Please continue with your regular dialysis session at [facility] dialysis center on Tuesday, Thursday and Saturday.

## 2018-03-13 NOTE — DISCHARGE NOTE ADULT - HOSPITAL COURSE
Patient is a 57 year old woman with history of polycythemia vera (off Hydroxyurea), ESRD on HD T/R/Sat (formerly on PD stopped 2/2 RP hematoma), PUD, and HTN who presents with chest pain. She was in dialysis when she suddenly developed an intense, 9/10, chest pressure "like someone was sitting on me." It radiated to her flanks. It lasted about an hour, but improved greatly with some Tylenol, and completely resolved (from a 4/10 in severity) after nitroglycerin. In the ED, vital signs were  Temp 97.7F, HR 80, /70 (repeat 130/58), RR 17, O2 97% on RA.    Patient was admitted to the medicine service for chest pain to rule out ACS. Her cardiac enzyme was negative x3. Her EKG was found to have no significant change but with tall P waves. Patient remained chest pain free throughout the admission. She underwent TTE which showed EF 51% but grossly normal LV function. She was found to have hyperkalemia on labs which resolved with temporizing measures and Kayexalate. She underwent nuclear stress test which showed    Patient remains hemodynamically stable in good condition to be discharged to home on . She will follow up with her PCP Dr. Laure Guo in 1-2 weeks. Patient is a 57 year old woman with history of polycythemia vera (off Hydroxyurea), ESRD on HD T/R/Sat (formerly on PD stopped 2/2 RP hematoma), PUD, and HTN who presents with chest pain. She was in dialysis when she suddenly developed an intense, 9/10, chest pressure "like someone was sitting on me." It radiated to her flanks. It lasted about an hour, but improved greatly with some Tylenol, and completely resolved (from a 4/10 in severity) after nitroglycerin. In the ED, vital signs were  Temp 97.7F, HR 80, /70 (repeat 130/58), RR 17, O2 97% on RA.    Patient was admitted to the medicine service for chest pain to rule out ACS. Her cardiac enzyme was negative x3. Her EKG was found to have no significant change but with tall P waves. Patient remained chest pain free throughout the admission. She underwent TTE which showed EF 51% but grossly normal LV function. She was found to have hyperkalemia on labs which resolved with temporizing measures and Kayexalate. She underwent nuclear stress test which is normal. Patient was started on Famotidine for acid reflux related chest discomfort. In addition, she was found to have early cirrhosis on CT Abdomen and pelvis and work up for hemachromatosis, cash, autoimmune hepatitis has thus far been negative. Patient underwent an abdominal ultrasound to evaluate for hepatitis steatosis.     Patient remains hemodynamically stable in good condition to be discharged to home on 3/15/2018. She will follow up with her PCP Dr. Laure Guo in 1-2 weeks for report of abdominal ultrasound. She will also resume her regular dialysis session at [facility] on Tuesday, Thursday and Saturday. Patient is agreeable to the discharge plan. Patient is a 57 year old woman with history of polycythemia vera (off Hydroxyurea), ESRD on HD T/R/Sat (formerly on PD stopped 2/2 RP hematoma), PUD, and HTN who presents with chest pain. She was in dialysis when she suddenly developed an intense, 9/10, chest pressure "like someone was sitting on me." It radiated to her flanks. It lasted about an hour, but improved greatly with some Tylenol, and completely resolved (from a 4/10 in severity) after nitroglycerin. In the ED, vital signs were  Temp 97.7F, HR 80, /70 (repeat 130/58), RR 17, O2 97% on RA.    Patient was admitted to the medicine service for chest pain to rule out ACS. Her cardiac enzyme was negative x3. Her EKG was found to have no significant change but with tall P waves. Patient remained chest pain free throughout the admission. She underwent TTE which showed EF 51% but grossly normal LV function. She was found to have hyperkalemia on labs which resolved with temporizing measures and Kayexalate. She underwent nuclear stress test which is normal. Patient was started on Famotidine for acid reflux related chest discomfort. In addition, she was found to have early cirrhosis on CT Abdomen and pelvis and work up for hemachromatosis, cash, autoimmune hepatitis has thus far been negative. Patient underwent an abdominal ultrasound to evaluate for hepatitis steatosis, which was negative for liver mass or nodularity. Patient was ready to be discharge on 3/15 when she had an acute episode of back pain along with chest discomfort after HD and abdominal ultrasound and for not eating the entire morning, pain improved with one time dose of IV pain medication and resolved overnight.  Back spasm and chest discomfort likely secondary to fluid shift after HD.     Patient remains hemodynamically stable in good condition to be discharged to home on 3/16/2018. She will follow up with her PCP Dr. Laure Guo in 1-2 weeks. She [facility] [place] on [facility], Thursday and Saturday. Patient is agreeable to the discharge plan.

## 2018-03-13 NOTE — PROGRESS NOTE ADULT - SUBJECTIVE AND OBJECTIVE BOX
CHIEF COMPLAINT: Chest pain during dialysis    Interval Events: No acute events overnight. Patient continues to remain hemodynamically stable overnight, in sinus rhythm HR  on tele monitor. She is seen and examined at the bedside this morning. She denies chest pain, no respiratory distress. She denies nausea or vomit. She does have some sore throat after the Albuterol treatment yesterday with no nasal congestion and no coughing.     OBJECTIVE:  Vital Signs Last 24 Hrs  T(C): 36.8 (13 Mar 2018 04:33), Max: 36.8 (12 Mar 2018 20:08)  T(F): 98.3 (13 Mar 2018 04:33), Max: 98.3 (12 Mar 2018 20:08)  HR: 86 (13 Mar 2018 04:33) (86 - 88)  BP: 156/90 (13 Mar 2018 04:33) (131/81 - 156/90)  BP(mean): --  RR: 18 (13 Mar 2018 04:33) (18 - 18)  SpO2: 98% (13 Mar 2018 04:33) (98% - 98%)    03-12 @ 07:01  -  03-13 @ 07:00  --------------------------------------------------------  IN: 1100 mL / OUT: 950 mL / NET: 150 mL      CAPILLARY BLOOD GLUCOSE      POCT Blood Glucose.: 121 mg/dL (12 Mar 2018 16:23)      PHYSICAL EXAM:  General: Alert and cooperative. Not in acute stress. Well developed, well nourished.   Head: Normocephalic, no mass and lesions.  Eyes: PERRLA. EOMI.   Respiratory: Bilateral lung clear to auscultation, no crackles, no wheezes, no rhonchi.   Cardiovascular: S1/S2 auscultated, no murmur, or gallop. Rhythm is regular. There is no peripheral edema.  Abdomen: Soft, non-tender, nondistended, no guarding or rebound tenderness. Active bowel sounds in all 4 quadrants.   Extremities: No significant deformity or joint abnormality. No peripheral edema.   Neurological: AAOx3. No focal deficit. Ambulating.     HOSPITAL MEDICATIONS:  heparin  Injectable 5000 Unit(s) SubCutaneous every 8 hours      furosemide    Tablet 60 milliGRAM(s) Oral daily          calcium carbonate 500 mG (Tums) Chewable 1 Tablet(s) Chew daily PRN  famotidine    Tablet 20 milliGRAM(s) Oral two times a day        sodium bicarbonate 1300 milliGRAM(s) Oral three times a day            LABS:                        10.6   10.72 )-----------( 246      ( 12 Mar 2018 09:09 )             37.7     Hgb Trend: 10.6<--, 10.4<--, 12.3<--  03-13    138  |  97  |  53<H>  ----------------------------<  86  4.2   |  21<L>  |  7.93<H>    Ca    8.7      13 Mar 2018 05:56  Phos  7.4     03-13  Mg     2.6     03-13    TPro  7.8  /  Alb  3.6  /  TBili  0.8  /  DBili  x   /  AST  13  /  ALT  6<L>  /  AlkPhos  149<H>  03-12    Creatinine Trend: 7.93<--, 7.70<--, 7.37<--, 7.27<--, 6.52<--, 4.93<--        RADIOLOGY:  < from: CT Angio Abdomen and Pelvis w/ IV Cont (03.10.18 @ 17:49) >  FINDINGS:    CHEST:     LUNGS AND LARGE AIRWAYS: Patent central airways. No pulmonary nodules.  PLEURA: No pleural effusion.  VESSELS: No evidence of aortic dissection or intramural hematoma.  HEART: Mild cardiomegaly. No pericardial effusion.  MEDIASTINUM AND DAVION: No lymphadenopathy.  CHEST WALL AND LOWER NECK: Within normal limits.    ABDOMEN AND PELVIS:    LIVER: Caudate hypertrophy and medial segment atrophy which may be seen   in early cirrhosis.  BILE DUCTS: Normal caliber.  GALLBLADDER: Within normal limits.  SPLEEN: Marked splenomegaly.  PANCREAS: Within normal limits.  ADRENALS: Within normal limits.  KIDNEYS/URETERS: Atrophic kidneys.    BLADDER: Within normal limits.  REPRODUCTIVE ORGANS: The uterus and adnexa are within normal limits.    BOWEL:No bowel obstruction. Appendix is normal.  PERITONEUM: No ascites. A previously characterized hematoma in the left   upper quadrant measures 2.7 x 2.1 cm, previously 5.5 x 5.0 cm.  VESSELS: Within normal limits.  RETROPERITONEUM: No lymphadenopathy.  ABDOMINAL WALL: Within normal limits.  BONES: Mild degenerative changes of the spine.    IMPRESSION:     No evidence of aortic dissection.    Marked splenomegaly with questionable early cirrhosis.    Left upper quadrant hematoma decreased in size or prior imaging   10/11/2017.      < end of copied text >    < from: Transthoracic Echocardiogram (03.12.18 @ 11:56) >  EF 51%  Conclusions:  1. Increased relative wall thickness with normal left  ventricular mass index, consistent with concentric left  ventricular remodeling.  2. Normal left ventricular systolic function. No segmental  wall motion abnormalities.    < end of copied text >

## 2018-03-13 NOTE — DISCHARGE NOTE ADULT - CARE PROVIDER_API CALL
Zach Hanson), Internal Medicine  [address] [place] [address]  Phone: [phone]  Fax: [phone] Zach Hanson), Internal Medicine  [address] [facility] [address]  [place] [address]  Phone: [phone]  Fax: [phone]    Azucena De León), Internal Medicine; Nephrology  [address] [facility] [address] [place] [address]  Phone: [phone]  Fax: [phone]

## 2018-03-13 NOTE — DISCHARGE NOTE ADULT - PLAN OF CARE
Chest pain free You presented to the hospital with chest pain after dialysis, likely in the setting of fluid shift during dialysis. You were found to have negative cardiac enzyme and echocardiogram of the heart shows normal heart structure. You presented to the hospital with chest pain after dialysis, likely in the setting of fluid shift during dialysis. You were found to have negative cardiac enzyme and echocardiogram of the heart shows normal heart structure. You also underwent a stress test which was normal, please see the detailed report below. Your chest comfort could also be related to acid reflux, which you were started on Famotidine. Please continue with your regular dialysis session at [facility] dialysis center on Tuesday, Thursday and Saturday.

## 2018-03-13 NOTE — PROVIDER CONTACT NOTE (OTHER) - ACTION/TREATMENT ORDERED:
NP aware, okay to give lasix now. Will continue to monitor patient.
STAT 10units insulin, 50% dextrose, 40mg Lasix

## 2018-03-14 LAB
ANION GAP SERPL CALC-SCNC: 15 MMOL/L — SIGNIFICANT CHANGE UP (ref 5–17)
BUN SERPL-MCNC: 31 MG/DL — HIGH (ref 7–23)
CALCIUM SERPL-MCNC: 8.7 MG/DL — SIGNIFICANT CHANGE UP (ref 8.4–10.5)
CHLORIDE SERPL-SCNC: 98 MMOL/L — SIGNIFICANT CHANGE UP (ref 96–108)
CO2 SERPL-SCNC: 28 MMOL/L — SIGNIFICANT CHANGE UP (ref 22–31)
CREAT SERPL-MCNC: 5.94 MG/DL — HIGH (ref 0.5–1.3)
FERRITIN SERPL-MCNC: 122 NG/ML — SIGNIFICANT CHANGE UP (ref 15–150)
GLUCOSE SERPL-MCNC: 83 MG/DL — SIGNIFICANT CHANGE UP (ref 70–99)
HCV AB S/CO SERPL IA: 0.2 S/CO — SIGNIFICANT CHANGE UP
HCV AB SERPL-IMP: SIGNIFICANT CHANGE UP
IRON SATN MFR SERPL: 26 UG/DL — LOW (ref 30–160)
IRON SATN MFR SERPL: 38 % — SIGNIFICANT CHANGE UP (ref 14–50)
MAGNESIUM SERPL-MCNC: 2.2 MG/DL — SIGNIFICANT CHANGE UP (ref 1.6–2.6)
MITOCHONDRIA AB SER-ACNC: SIGNIFICANT CHANGE UP
PHOSPHATE SERPL-MCNC: 5.1 MG/DL — HIGH (ref 2.5–4.5)
POTASSIUM SERPL-MCNC: 4.4 MMOL/L — SIGNIFICANT CHANGE UP (ref 3.5–5.3)
POTASSIUM SERPL-SCNC: 4.4 MMOL/L — SIGNIFICANT CHANGE UP (ref 3.5–5.3)
SMOOTH MUSCLE AB SER-ACNC: SIGNIFICANT CHANGE UP
SODIUM SERPL-SCNC: 141 MMOL/L — SIGNIFICANT CHANGE UP (ref 135–145)
TIBC SERPL-MCNC: 68 UG/DL — LOW (ref 220–430)
UIBC SERPL-MCNC: 42 UG/DL — LOW (ref 110–370)

## 2018-03-14 PROCEDURE — 93018 CV STRESS TEST I&R ONLY: CPT

## 2018-03-14 PROCEDURE — 78452 HT MUSCLE IMAGE SPECT MULT: CPT | Mod: 26

## 2018-03-14 PROCEDURE — 93016 CV STRESS TEST SUPVJ ONLY: CPT

## 2018-03-14 PROCEDURE — 99233 SBSQ HOSP IP/OBS HIGH 50: CPT | Mod: GC

## 2018-03-14 RX ORDER — FERROUS SULFATE 325(65) MG
325 TABLET ORAL THREE TIMES A DAY
Qty: 0 | Refills: 0 | Status: DISCONTINUED | OUTPATIENT
Start: 2018-03-14 | End: 2018-03-16

## 2018-03-14 RX ADMIN — Medication 60 MILLIGRAM(S): at 05:14

## 2018-03-14 RX ADMIN — HEPARIN SODIUM 5000 UNIT(S): 5000 INJECTION INTRAVENOUS; SUBCUTANEOUS at 13:39

## 2018-03-14 RX ADMIN — Medication 325 MILLIGRAM(S): at 17:33

## 2018-03-14 RX ADMIN — Medication 1300 MILLIGRAM(S): at 13:39

## 2018-03-14 RX ADMIN — FAMOTIDINE 20 MILLIGRAM(S): 10 INJECTION INTRAVENOUS at 17:33

## 2018-03-14 RX ADMIN — HEPARIN SODIUM 5000 UNIT(S): 5000 INJECTION INTRAVENOUS; SUBCUTANEOUS at 05:14

## 2018-03-14 RX ADMIN — Medication 1300 MILLIGRAM(S): at 05:13

## 2018-03-14 RX ADMIN — Medication 325 MILLIGRAM(S): at 21:16

## 2018-03-14 RX ADMIN — HEPARIN SODIUM 5000 UNIT(S): 5000 INJECTION INTRAVENOUS; SUBCUTANEOUS at 21:16

## 2018-03-14 RX ADMIN — Medication 1300 MILLIGRAM(S): at 21:16

## 2018-03-14 RX ADMIN — FAMOTIDINE 20 MILLIGRAM(S): 10 INJECTION INTRAVENOUS at 05:13

## 2018-03-14 NOTE — PROGRESS NOTE ADULT - SUBJECTIVE AND OBJECTIVE BOX
CHIEF COMPLAINT: Chest pain during dialysis    Interval Events: Patient underwent dialysis yesterday, 1.7 L removed. No acute events overnight. Patient continues to remain hemodynamically stable overnight, in sinus rhythm HR  on tele monitor. She is seen and examined at the bedside this morning, sleeping comfortably. She denies chest pain, no respiratory distress. She denies nausea or vomit.    OBJECTIVE:  Vital Signs Last 24 Hrs  T(C): 37.3 (14 Mar 2018 04:43), Max: 37.3 (14 Mar 2018 04:43)  T(F): 99.1 (14 Mar 2018 04:43), Max: 99.1 (14 Mar 2018 04:43)  HR: 88 (14 Mar 2018 04:43) (88 - 115)  BP: 121/78 (14 Mar 2018 04:43) (107/65 - 143/90)  BP(mean): --  RR: 18 (14 Mar 2018 04:43) (18 - 19)  SpO2: 96% (14 Mar 2018 04:43) (95% - 99%)    03-13 @ 07:01  -  03-14 @ 07:00  --------------------------------------------------------  IN: 1945 mL / OUT: 2700 mL / NET: -755 mL      CAPILLARY BLOOD GLUCOSE      POCT Blood Glucose.: 121 mg/dL (12 Mar 2018 16:23)    PHYSICAL EXAM:  General: Alert and cooperative. Not in acute stress. Well developed, well nourished.   Head: Normocephalic, no mass and lesions.  Eyes: PERRLA. EOMI.   Respiratory: Bilateral lung clear to auscultation, no crackles, no wheezes, no rhonchi.   Cardiovascular: S1/S2 auscultated, no murmur, or gallop. Rhythm is regular. There is no peripheral edema.  Abdomen: Soft, non-tender, nondistended, no guarding or rebound tenderness. Active bowel sounds in all 4 quadrants.   Extremities: No significant deformity or joint abnormality. No peripheral edema.   Neurological: AAOx3. No focal deficit. Ambulating.     HOSPITAL MEDICATIONS:  heparin  Injectable 5000 Unit(s) SubCutaneous every 8 hours      furosemide    Tablet 60 milliGRAM(s) Oral daily          calcium carbonate 500 mG (Tums) Chewable 1 Tablet(s) Chew daily PRN  famotidine    Tablet 20 milliGRAM(s) Oral two times a day        sodium bicarbonate 1300 milliGRAM(s) Oral three times a day      benzocaine 15 mG/menthol 3.6 mG Lozenge 1 Lozenge Oral four times a day PRN        LABS:                        10.6   10.72 )-----------( 246      ( 12 Mar 2018 09:09 )             37.7     Hgb Trend: 10.6<--, 10.4<--, 12.3<--  03-14    141  |  98  |  31<H>  ----------------------------<  83  4.4   |  28  |  5.94<H>    Ca    8.7      14 Mar 2018 06:03  Phos  5.1     03-14  Mg     2.2     03-14      Creatinine Trend: 5.94<--, 7.93<--, 7.70<--, 7.37<--, 7.27<--, 6.52<--        RADIOLOGY:  < from: CT Angio Abdomen and Pelvis w/ IV Cont (03.10.18 @ 17:49) >  FINDINGS:    CHEST:     LUNGS AND LARGE AIRWAYS: Patent central airways. No pulmonary nodules.  PLEURA: No pleural effusion.  VESSELS: No evidence of aortic dissection or intramural hematoma.  HEART: Mild cardiomegaly. No pericardial effusion.  MEDIASTINUM AND DAVION: No lymphadenopathy.  CHEST WALL AND LOWER NECK: Within normal limits.    ABDOMEN AND PELVIS:    LIVER: Caudate hypertrophy and medial segment atrophy which may be seen   in early cirrhosis.  BILE DUCTS: Normal caliber.  GALLBLADDER: Within normal limits.  SPLEEN: Marked splenomegaly.  PANCREAS: Within normal limits.  ADRENALS: Within normal limits.  KIDNEYS/URETERS: Atrophic kidneys.    BLADDER: Within normal limits.  REPRODUCTIVE ORGANS: The uterus and adnexa are within normal limits.    BOWEL:No bowel obstruction. Appendix is normal.  PERITONEUM: No ascites. A previously characterized hematoma in the left   upper quadrant measures 2.7 x 2.1 cm, previously 5.5 x 5.0 cm.  VESSELS: Within normal limits.  RETROPERITONEUM: No lymphadenopathy.  ABDOMINAL WALL: Within normal limits.  BONES: Mild degenerative changes of the spine.    IMPRESSION:     No evidence of aortic dissection.    Marked splenomegaly with questionable early cirrhosis.    Left upper quadrant hematoma decreased in size or prior imaging   10/11/2017.      < end of copied text >    < from: Transthoracic Echocardiogram (03.12.18 @ 11:56) >  EF 51%  Conclusions:  1. Increased relative wall thickness with normal left  ventricular mass index, consistent with concentric left  ventricular remodeling.  2. Normal left ventricular systolic function. No segmental  wall motion abnormalities.    < end of copied text >

## 2018-03-15 LAB
ANION GAP SERPL CALC-SCNC: 21 MMOL/L — HIGH (ref 5–17)
BUN SERPL-MCNC: 46 MG/DL — HIGH (ref 7–23)
CALCIUM SERPL-MCNC: 8.8 MG/DL — SIGNIFICANT CHANGE UP (ref 8.4–10.5)
CERULOPLASMIN SERPL-MCNC: 21 MG/DL — SIGNIFICANT CHANGE UP (ref 20–60)
CHLORIDE SERPL-SCNC: 98 MMOL/L — SIGNIFICANT CHANGE UP (ref 96–108)
CO2 SERPL-SCNC: 19 MMOL/L — LOW (ref 22–31)
CREAT SERPL-MCNC: 7.27 MG/DL — HIGH (ref 0.5–1.3)
GLUCOSE SERPL-MCNC: 163 MG/DL — HIGH (ref 70–99)
MAGNESIUM SERPL-MCNC: 2.1 MG/DL — SIGNIFICANT CHANGE UP (ref 1.6–2.6)
PHOSPHATE SERPL-MCNC: 6.4 MG/DL — HIGH (ref 2.5–4.5)
POTASSIUM SERPL-MCNC: 4.3 MMOL/L — SIGNIFICANT CHANGE UP (ref 3.5–5.3)
POTASSIUM SERPL-SCNC: 4.3 MMOL/L — SIGNIFICANT CHANGE UP (ref 3.5–5.3)
SODIUM SERPL-SCNC: 138 MMOL/L — SIGNIFICANT CHANGE UP (ref 135–145)

## 2018-03-15 PROCEDURE — 99233 SBSQ HOSP IP/OBS HIGH 50: CPT | Mod: GC

## 2018-03-15 PROCEDURE — 93010 ELECTROCARDIOGRAM REPORT: CPT

## 2018-03-15 PROCEDURE — 76700 US EXAM ABDOM COMPLETE: CPT | Mod: 26

## 2018-03-15 RX ORDER — DOXERCALCIFEROL 2.5 UG/1
1 CAPSULE ORAL
Qty: 0 | Refills: 0 | Status: DISCONTINUED | OUTPATIENT
Start: 2018-03-15 | End: 2018-03-16

## 2018-03-15 RX ORDER — FAMOTIDINE 10 MG/ML
1 INJECTION INTRAVENOUS
Qty: 60 | Refills: 2
Start: 2018-03-15 | End: 2018-06-12

## 2018-03-15 RX ORDER — SODIUM BICARBONATE 1 MEQ/ML
2 SYRINGE (ML) INTRAVENOUS
Qty: 0 | Refills: 0 | COMMUNITY

## 2018-03-15 RX ORDER — HYDROMORPHONE HYDROCHLORIDE 2 MG/ML
0.5 INJECTION INTRAMUSCULAR; INTRAVENOUS; SUBCUTANEOUS ONCE
Qty: 0 | Refills: 0 | Status: DISCONTINUED | OUTPATIENT
Start: 2018-03-15 | End: 2018-03-15

## 2018-03-15 RX ORDER — ACETAMINOPHEN 500 MG
650 TABLET ORAL ONCE
Qty: 0 | Refills: 0 | Status: COMPLETED | OUTPATIENT
Start: 2018-03-15 | End: 2018-03-15

## 2018-03-15 RX ORDER — ERYTHROPOIETIN 10000 [IU]/ML
5000 INJECTION, SOLUTION INTRAVENOUS; SUBCUTANEOUS
Qty: 0 | Refills: 0 | Status: DISCONTINUED | OUTPATIENT
Start: 2018-03-15 | End: 2018-03-16

## 2018-03-15 RX ADMIN — Medication 325 MILLIGRAM(S): at 21:22

## 2018-03-15 RX ADMIN — HEPARIN SODIUM 5000 UNIT(S): 5000 INJECTION INTRAVENOUS; SUBCUTANEOUS at 21:22

## 2018-03-15 RX ADMIN — HYDROMORPHONE HYDROCHLORIDE 0.5 MILLIGRAM(S): 2 INJECTION INTRAMUSCULAR; INTRAVENOUS; SUBCUTANEOUS at 15:00

## 2018-03-15 RX ADMIN — ERYTHROPOIETIN 5000 UNIT(S): 10000 INJECTION, SOLUTION INTRAVENOUS; SUBCUTANEOUS at 11:35

## 2018-03-15 RX ADMIN — Medication 325 MILLIGRAM(S): at 17:10

## 2018-03-15 RX ADMIN — FAMOTIDINE 20 MILLIGRAM(S): 10 INJECTION INTRAVENOUS at 05:19

## 2018-03-15 RX ADMIN — Medication 650 MILLIGRAM(S): at 20:08

## 2018-03-15 RX ADMIN — HEPARIN SODIUM 5000 UNIT(S): 5000 INJECTION INTRAVENOUS; SUBCUTANEOUS at 05:19

## 2018-03-15 RX ADMIN — Medication 325 MILLIGRAM(S): at 05:19

## 2018-03-15 RX ADMIN — HYDROMORPHONE HYDROCHLORIDE 0.5 MILLIGRAM(S): 2 INJECTION INTRAMUSCULAR; INTRAVENOUS; SUBCUTANEOUS at 14:17

## 2018-03-15 RX ADMIN — FAMOTIDINE 20 MILLIGRAM(S): 10 INJECTION INTRAVENOUS at 17:10

## 2018-03-15 RX ADMIN — Medication 1300 MILLIGRAM(S): at 05:19

## 2018-03-15 RX ADMIN — Medication 650 MILLIGRAM(S): at 21:24

## 2018-03-15 RX ADMIN — Medication 60 MILLIGRAM(S): at 05:19

## 2018-03-15 RX ADMIN — DOXERCALCIFEROL 1 MICROGRAM(S): 2.5 CAPSULE ORAL at 11:34

## 2018-03-15 RX ADMIN — HEPARIN SODIUM 5000 UNIT(S): 5000 INJECTION INTRAVENOUS; SUBCUTANEOUS at 17:09

## 2018-03-15 NOTE — PROGRESS NOTE ADULT - SUBJECTIVE AND OBJECTIVE BOX
[facility] DIVISION OF KIDNEY DISEASES AND HYPERTENSION -- HEMODIALYSIS NOTE  --------------------------------------------------------------------------------  Chief Complaint: ESRD/Ongoing hemodialysis requirement    24 hour events/subjective:  no overnight events.  feels well. denies any further chest pain episodes.    PAST HISTORY  --------------------------------------------------------------------------------  No significant changes to PMH, PSH, FHx, SHx, unless otherwise noted    ALLERGIES & MEDICATIONS  --------------------------------------------------------------------------------  Allergies    No Known Allergies    Intolerances      Standing Inpatient Medications  famotidine    Tablet 20 milliGRAM(s) Oral two times a day  ferrous    sulfate 325 milliGRAM(s) Oral three times a day  furosemide    Tablet 60 milliGRAM(s) Oral daily  heparin  Injectable 5000 Unit(s) SubCutaneous every 8 hours  sodium bicarbonate 1300 milliGRAM(s) Oral three times a day    PRN Inpatient Medications  benzocaine 15 mG/menthol 3.6 mG Lozenge 1 Lozenge Oral four times a day PRN  calcium carbonate 500 mG (Tums) Chewable 1 Tablet(s) Chew daily PRN      REVIEW OF SYSTEMS  --------------------------------------------------------------------------------  Gen: No weight changes, fatigue, fevers/chills, weakness  Skin: No rashes  Respiratory: No dyspnea, cough, wheezing, hemoptysis  CV: No chest pain, PND, orthopnea  GI: No abdominal pain, diarrhea, constipation, nausea, vomiting, melena, hematochezia  : No increased frequency, dysuria, hematuria, nocturia  MSK: No joint pain/swelling; no back pain; no edema    All other systems were reviewed and are negative, except as noted.    VITALS/PHYSICAL EXAM  --------------------------------------------------------------------------------  T(C): 36.6 (03-15-18 @ 05:05), Max: 37.2 (03-14-18 @ 16:33)  HR: 77 (03-15-18 @ 05:05) (74 - 79)  BP: 142/74 (03-15-18 @ 05:05) (135/79 - 142/74)  RR: 18 (03-15-18 @ 05:05) (18 - 19)  SpO2: 94% (03-15-18 @ 05:05) (94% - 98%)  Wt(kg): --        03-14-18 @ 07:01  -  03-15-18 @ 07:00  --------------------------------------------------------  IN: 250 mL / OUT: 0 mL / NET: 250 mL      Physical Exam:  	Gen: NAD, well-appearing  	Pulm: CTA B/L  	CV: RRR, S1S2; no rub  	Abd: +BS, soft, nontender/nondistended  	: No suprapubic tenderness  	UE: Warm, ; no edema; no asterixis  	LE: Warm,  no edema  	Psych: Normal affect and mood  	Skin: Warm  	Vascular access: LUE AVF, thrill+ bruit+    LABS/STUDIES  --------------------------------------------------------------------------------    141  |  98  |  31  ----------------------------<  83      [03-14-18 @ 06:03]  4.4   |  28  |  5.94        Ca     8.7     [03-14-18 @ 06:03]      Mg     2.2     [03-14-18 @ 06:03]      Phos  5.1     [03-14-18 @ 06:03]            Iron 26, TIBC 68, %sat 38      [03-14-18 @ 10:48]  Ferritin 122      [03-14-18 @ 09:37]  HbA1c 4.3      [03-11-18 @ 09:35]  TSH 1.68      [03-11-18 @ 09:33]  Lipid: chol 107, TG 64, HDL 39, LDL 55      [03-11-18 @ 09:47]    HBsAb <3.0      [03-13-18 @ 15:04]  HBsAg Nonreact      [03-13-18 @ 15:04]  HBcAb Nonreact      [03-13-18 @ 15:04]  HCV 0.20, Nonreact      [03-14-18 @ 07:51]

## 2018-03-15 NOTE — PROGRESS NOTE ADULT - SUBJECTIVE AND OBJECTIVE BOX
CHIEF COMPLAINT: Chest pain during dialysis    Interval Events: Patient underwent nuclear stress test yesterday, normal EF, no evidence of ACS. Patient remained hemodynamically stable overnight. She is seen and examined at the bedside this morning. She denies chest pain, no respiratory distress. She denies nausea or vomit.    OBJECTIVE:  Vital Signs Last 24 Hrs  T(C): 36.6 (15 Mar 2018 05:05), Max: 37.2 (14 Mar 2018 16:33)  T(F): 97.9 (15 Mar 2018 05:05), Max: 99 (14 Mar 2018 16:33)  HR: 77 (15 Mar 2018 05:05) (74 - 79)  BP: 142/74 (15 Mar 2018 05:05) (135/79 - 142/74)  BP(mean): --  RR: 18 (15 Mar 2018 05:05) (18 - 19)  SpO2: 94% (15 Mar 2018 05:05) (94% - 98%)    03-14 @ 07:01  -  03-15 @ 07:00  --------------------------------------------------------  IN: 250 mL / OUT: 0 mL / NET: 250 mL      CAPILLARY BLOOD GLUCOSE      PHYSICAL EXAM:  General: Alert and cooperative. Not in acute stress. Well developed, well nourished.   Head: Normocephalic, no mass and lesions.  Eyes: PERRLA. EOMI.   Respiratory: Bilateral lung clear to auscultation, no crackles, no wheezes, no rhonchi.   Cardiovascular: S1/S2 auscultated, no murmur, or gallop. Rhythm is regular. There is no peripheral edema.  Abdomen: Soft, non-tender, nondistended, no guarding or rebound tenderness. Active bowel sounds in all 4 quadrants.   Extremities: No significant deformity or joint abnormality. No peripheral edema.   Neurological: AAOx3. No focal deficit. Ambulating.     HOSPITAL MEDICATIONS:  heparin  Injectable 5000 Unit(s) SubCutaneous every 8 hours      furosemide    Tablet 60 milliGRAM(s) Oral daily          calcium carbonate 500 mG (Tums) Chewable 1 Tablet(s) Chew daily PRN  famotidine    Tablet 20 milliGRAM(s) Oral two times a day        ferrous    sulfate 325 milliGRAM(s) Oral three times a day  sodium bicarbonate 1300 milliGRAM(s) Oral three times a day      benzocaine 15 mG/menthol 3.6 mG Lozenge 1 Lozenge Oral four times a day PRN        LABS:    Hgb Trend: 10.6<--, 10.4<--, 12.3<--  03-14    141  |  98  |  31<H>  ----------------------------<  83  4.4   |  28  |  5.94<H>    Ca    8.7      14 Mar 2018 06:03  Phos  5.1     03-14  Mg     2.2     03-14      Creatinine Trend: 5.94<--, 7.93<--, 7.70<--, 7.37<--, 7.27<--, 6.52<--      RADIOLOGY:  < from: CT Angio Abdomen and Pelvis w/ IV Cont (03.10.18 @ 17:49) >  FINDINGS:    CHEST:     LUNGS AND LARGE AIRWAYS: Patent central airways. No pulmonary nodules.  PLEURA: No pleural effusion.  VESSELS: No evidence of aortic dissection or intramural hematoma.  HEART: Mild cardiomegaly. No pericardial effusion.  MEDIASTINUM AND DAVION: No lymphadenopathy.  CHEST WALL AND LOWER NECK: Within normal limits.    ABDOMEN AND PELVIS:    LIVER: Caudate hypertrophy and medial segment atrophy which may be seen   in early cirrhosis.  BILE DUCTS: Normal caliber.  GALLBLADDER: Within normal limits.  SPLEEN: Marked splenomegaly.  PANCREAS: Within normal limits.  ADRENALS: Within normal limits.  KIDNEYS/URETERS: Atrophic kidneys.    BLADDER: Within normal limits.  REPRODUCTIVE ORGANS: The uterus and adnexa are within normal limits.    BOWEL:No bowel obstruction. Appendix is normal.  PERITONEUM: No ascites. A previously characterized hematoma in the left   upper quadrant measures 2.7 x 2.1 cm, previously 5.5 x 5.0 cm.  VESSELS: Within normal limits.  RETROPERITONEUM: No lymphadenopathy.  ABDOMINAL WALL: Within normal limits.  BONES: Mild degenerative changes of the spine.    IMPRESSION:     No evidence of aortic dissection.    Marked splenomegaly with questionable early cirrhosis.    Left upper quadrant hematoma decreased in size or prior imaging   10/11/2017.      < end of copied text >    < from: Transthoracic Echocardiogram (03.12.18 @ 11:56) >  EF 51%  Conclusions:  1. Increased relative wall thickness with normal left  ventricular mass index, consistent with concentric left  ventricular remodeling.  2. Normal left ventricular systolic function. No segmental  wall motion abnormalities.    < end of copied text > CHIEF COMPLAINT: Chest pain during dialysis    Interval Events: Patient underwent nuclear stress test yesterday which was normal. Patient remained hemodynamically stable overnight. She is seen and examined at the bedside this morning. She denies chest pain, no respiratory distress. She denies nausea or vomit.    OBJECTIVE:  Vital Signs Last 24 Hrs  T(C): 36.6 (15 Mar 2018 05:05), Max: 37.2 (14 Mar 2018 16:33)  T(F): 97.9 (15 Mar 2018 05:05), Max: 99 (14 Mar 2018 16:33)  HR: 77 (15 Mar 2018 05:05) (74 - 79)  BP: 142/74 (15 Mar 2018 05:05) (135/79 - 142/74)  BP(mean): --  RR: 18 (15 Mar 2018 05:05) (18 - 19)  SpO2: 94% (15 Mar 2018 05:05) (94% - 98%)    03-14 @ 07:01  -  03-15 @ 07:00  --------------------------------------------------------  IN: 250 mL / OUT: 0 mL / NET: 250 mL      CAPILLARY BLOOD GLUCOSE      PHYSICAL EXAM:  General: Alert and cooperative. Not in acute stress. Well developed, well nourished.   Head: Normocephalic, no mass and lesions.  Eyes: PERRLA. EOMI.   Respiratory: Bilateral lung clear to auscultation, no crackles, no wheezes, no rhonchi.   Cardiovascular: S1/S2 auscultated, no murmur, or gallop. Rhythm is regular. There is no peripheral edema.  Abdomen: Soft, non-tender, nondistended, no guarding or rebound tenderness. Active bowel sounds in all 4 quadrants.   Extremities: No significant deformity or joint abnormality. No peripheral edema.   Neurological: AAOx3. No focal deficit. Ambulating.     HOSPITAL MEDICATIONS:  heparin  Injectable 5000 Unit(s) SubCutaneous every 8 hours      furosemide    Tablet 60 milliGRAM(s) Oral daily          calcium carbonate 500 mG (Tums) Chewable 1 Tablet(s) Chew daily PRN  famotidine    Tablet 20 milliGRAM(s) Oral two times a day        ferrous    sulfate 325 milliGRAM(s) Oral three times a day  sodium bicarbonate 1300 milliGRAM(s) Oral three times a day      benzocaine 15 mG/menthol 3.6 mG Lozenge 1 Lozenge Oral four times a day PRN        LABS:    Hgb Trend: 10.6<--, 10.4<--, 12.3<--  03-14    141  |  98  |  31<H>  ----------------------------<  83  4.4   |  28  |  5.94<H>    Ca    8.7      14 Mar 2018 06:03  Phos  5.1     03-14  Mg     2.2     03-14      Creatinine Trend: 5.94<--, 7.93<--, 7.70<--, 7.37<--, 7.27<--, 6.52<--      RADIOLOGY:  < from: CT Angio Abdomen and Pelvis w/ IV Cont (03.10.18 @ 17:49) >  FINDINGS:    CHEST:     LUNGS AND LARGE AIRWAYS: Patent central airways. No pulmonary nodules.  PLEURA: No pleural effusion.  VESSELS: No evidence of aortic dissection or intramural hematoma.  HEART: Mild cardiomegaly. No pericardial effusion.  MEDIASTINUM AND DAVION: No lymphadenopathy.  CHEST WALL AND LOWER NECK: Within normal limits.    ABDOMEN AND PELVIS:    LIVER: Caudate hypertrophy and medial segment atrophy which may be seen   in early cirrhosis.  BILE DUCTS: Normal caliber.  GALLBLADDER: Within normal limits.  SPLEEN: Marked splenomegaly.  PANCREAS: Within normal limits.  ADRENALS: Within normal limits.  KIDNEYS/URETERS: Atrophic kidneys.    BLADDER: Within normal limits.  REPRODUCTIVE ORGANS: The uterus and adnexa are within normal limits.    BOWEL:No bowel obstruction. Appendix is normal.  PERITONEUM: No ascites. A previously characterized hematoma in the left   upper quadrant measures 2.7 x 2.1 cm, previously 5.5 x 5.0 cm.  VESSELS: Within normal limits.  RETROPERITONEUM: No lymphadenopathy.  ABDOMINAL WALL: Within normal limits.  BONES: Mild degenerative changes of the spine.    IMPRESSION:     No evidence of aortic dissection.    Marked splenomegaly with questionable early cirrhosis.    Left upper quadrant hematoma decreased in size or prior imaging   10/11/2017.      < end of copied text >    < from: Transthoracic Echocardiogram (03.12.18 @ 11:56) >  EF 51%  Conclusions:  1. Increased relative wall thickness with normal left  ventricular mass index, consistent with concentric left  ventricular remodeling.  2. Normal left ventricular systolic function. No segmental  wall motion abnormalities.    < end of copied text >

## 2018-03-15 NOTE — CHART NOTE - NSCHARTNOTEFT_GEN_A_CORE
Called by the RN to the bedside as patient is complaining of severe pain. Ms. Landin just returned from dialysis and pain mainly located at bilateral lower back also feels chest pressure, severity 8-9/10, constant. Patient describes similar pain when she first presented to the hospital. Dr. Styles and I evaluated the patient at the bedside. EKG obtained, shows chronic T wave inversion at V2/V3 with no change in ST segments. Patient had extensive cardiac workup for ACS during this hospitalization and most recent nuclear stress test is normal, thus the chest discomfort likely not cardiogenic in etiology. Back pain and chest discomfort likely secondary to fluid shift after HD and patient did not eat anything this morning.     Plan:  - IV Dilaudid 0.5 mg ordered  - Patient still wishes to go home after pain is controlled, we can send her home on PO Tylenol as needed for pain.     Stephanie Resendiz PGY-1  Internal Medicine HS Team 1  Pager [phone] Called by the RN to the bedside as patient is complaining of severe pain. Ms. Landin just returned from dialysis and pain mainly located at bilateral lower back also feels chest pressure, severity 8-9/10, constant. Patient describes similar pain when she first presented to the hospital. Dr. Styles and I evaluated the patient at the bedside. EKG obtained, shows chronic T wave inversion at V2/V3 with no change in ST segments. Patient had extensive cardiac workup for ACS during this hospitalization and most recent nuclear stress test is normal, thus the chest discomfort likely not cardiogenic in etiology. Back pain and chest discomfort likely secondary to fluid shift after HD and patient did not eat anything this morning.     Plan:  - IV Dilaudid 0.5 mg ordered  - Explained to the patient that back pain likely secondary muscle spasm due to electrolyte shift after dialysis, she is medically cleared for discharge to home.     Stephanie Resendiz PGY-1  Internal Medicine HS Team 1  Pager [phone]

## 2018-03-16 VITALS
SYSTOLIC BLOOD PRESSURE: 126 MMHG | HEART RATE: 92 BPM | RESPIRATION RATE: 18 BRPM | TEMPERATURE: 98 F | DIASTOLIC BLOOD PRESSURE: 85 MMHG | OXYGEN SATURATION: 96 %

## 2018-03-16 DIAGNOSIS — M62.830 MUSCLE SPASM OF BACK: ICD-10-CM

## 2018-03-16 PROCEDURE — 85014 HEMATOCRIT: CPT

## 2018-03-16 PROCEDURE — 83605 ASSAY OF LACTIC ACID: CPT

## 2018-03-16 PROCEDURE — 74174 CTA ABD&PLVS W/CONTRAST: CPT

## 2018-03-16 PROCEDURE — 85730 THROMBOPLASTIN TIME PARTIAL: CPT

## 2018-03-16 PROCEDURE — 84443 ASSAY THYROID STIM HORMONE: CPT

## 2018-03-16 PROCEDURE — 85610 PROTHROMBIN TIME: CPT

## 2018-03-16 PROCEDURE — 86038 ANTINUCLEAR ANTIBODIES: CPT

## 2018-03-16 PROCEDURE — A9500: CPT

## 2018-03-16 PROCEDURE — 83550 IRON BINDING TEST: CPT

## 2018-03-16 PROCEDURE — 99285 EMERGENCY DEPT VISIT HI MDM: CPT | Mod: 25

## 2018-03-16 PROCEDURE — 82962 GLUCOSE BLOOD TEST: CPT

## 2018-03-16 PROCEDURE — 93005 ELECTROCARDIOGRAM TRACING: CPT

## 2018-03-16 PROCEDURE — 71045 X-RAY EXAM CHEST 1 VIEW: CPT

## 2018-03-16 PROCEDURE — 80061 LIPID PANEL: CPT

## 2018-03-16 PROCEDURE — 86255 FLUORESCENT ANTIBODY SCREEN: CPT

## 2018-03-16 PROCEDURE — 93017 CV STRESS TEST TRACING ONLY: CPT

## 2018-03-16 PROCEDURE — 84132 ASSAY OF SERUM POTASSIUM: CPT

## 2018-03-16 PROCEDURE — 86705 HEP B CORE ANTIBODY IGM: CPT

## 2018-03-16 PROCEDURE — 84295 ASSAY OF SERUM SODIUM: CPT

## 2018-03-16 PROCEDURE — 76700 US EXAM ABDOM COMPLETE: CPT

## 2018-03-16 PROCEDURE — 86803 HEPATITIS C AB TEST: CPT

## 2018-03-16 PROCEDURE — 82390 ASSAY OF CERULOPLASMIN: CPT

## 2018-03-16 PROCEDURE — 84484 ASSAY OF TROPONIN QUANT: CPT

## 2018-03-16 PROCEDURE — 86704 HEP B CORE ANTIBODY TOTAL: CPT

## 2018-03-16 PROCEDURE — 86376 MICROSOMAL ANTIBODY EACH: CPT

## 2018-03-16 PROCEDURE — 87340 HEPATITIS B SURFACE AG IA: CPT

## 2018-03-16 PROCEDURE — 78452 HT MUSCLE IMAGE SPECT MULT: CPT

## 2018-03-16 PROCEDURE — 82550 ASSAY OF CK (CPK): CPT

## 2018-03-16 PROCEDURE — 82728 ASSAY OF FERRITIN: CPT

## 2018-03-16 PROCEDURE — 82435 ASSAY OF BLOOD CHLORIDE: CPT

## 2018-03-16 PROCEDURE — 82330 ASSAY OF CALCIUM: CPT

## 2018-03-16 PROCEDURE — 84100 ASSAY OF PHOSPHORUS: CPT

## 2018-03-16 PROCEDURE — 93306 TTE W/DOPPLER COMPLETE: CPT

## 2018-03-16 PROCEDURE — 94640 AIRWAY INHALATION TREATMENT: CPT

## 2018-03-16 PROCEDURE — 81001 URINALYSIS AUTO W/SCOPE: CPT

## 2018-03-16 PROCEDURE — 85027 COMPLETE CBC AUTOMATED: CPT

## 2018-03-16 PROCEDURE — 80048 BASIC METABOLIC PNL TOTAL CA: CPT

## 2018-03-16 PROCEDURE — 82803 BLOOD GASES ANY COMBINATION: CPT

## 2018-03-16 PROCEDURE — 99239 HOSP IP/OBS DSCHRG MGMT >30: CPT

## 2018-03-16 PROCEDURE — 82947 ASSAY GLUCOSE BLOOD QUANT: CPT

## 2018-03-16 PROCEDURE — 99261: CPT

## 2018-03-16 PROCEDURE — 83735 ASSAY OF MAGNESIUM: CPT

## 2018-03-16 PROCEDURE — 83690 ASSAY OF LIPASE: CPT

## 2018-03-16 PROCEDURE — 83036 HEMOGLOBIN GLYCOSYLATED A1C: CPT

## 2018-03-16 PROCEDURE — 86706 HEP B SURFACE ANTIBODY: CPT

## 2018-03-16 PROCEDURE — 86381 MITOCHONDRIAL ANTIBODY EACH: CPT

## 2018-03-16 PROCEDURE — 71275 CT ANGIOGRAPHY CHEST: CPT

## 2018-03-16 PROCEDURE — 82553 CREATINE MB FRACTION: CPT

## 2018-03-16 PROCEDURE — 80053 COMPREHEN METABOLIC PANEL: CPT

## 2018-03-16 RX ADMIN — HEPARIN SODIUM 5000 UNIT(S): 5000 INJECTION INTRAVENOUS; SUBCUTANEOUS at 05:20

## 2018-03-16 RX ADMIN — FAMOTIDINE 20 MILLIGRAM(S): 10 INJECTION INTRAVENOUS at 05:20

## 2018-03-16 RX ADMIN — Medication 60 MILLIGRAM(S): at 05:20

## 2018-03-16 RX ADMIN — Medication 325 MILLIGRAM(S): at 05:20

## 2018-03-16 NOTE — PROGRESS NOTE ADULT - ASSESSMENT
Patient is a 57 year old woman with history of polycythemia vera and ESRD secondary to chronic glomerulonephritis on HD T/R/Sat (formerly on PD stopped due to RP hematoma), PUD and HTN presented with chest pain during dialysis now resolved.
57 year old woman with ESRD on HD presenting with chest pain s/p CTA chest and CTA abdomen to r/o PE and aortic dissection. Pt with signs of early cirrhosis on CT abdomen and currently being worked up.
57 year old woman with ESRD on HD presenting with chest pain.
Patient is a 57 year old woman with history of polycythemia vera and ESRD secondary to chronic glomerulonephritis on HD T/R/Sat (formerly on PD stopped due to RP hematoma), PUD and HTN presented with chest pain during dialysis now resolved with normal nuclear stress test. Patient was also found to have early cirrhosis on CT scan.
Patient is a 57 year old woman with history of polycythemia vera and ESRD secondary to chronic glomerulonephritis on HD T/R/Sat (formerly on PD stopped due to RP hematoma), PUD and HTN presented with chest pain during dialysis now resolved.
Patient is a 57 year old woman with history of polycythemia vera and ESRD secondary to chronic glomerulonephritis on HD T/R/Sat (formerly on PD stopped due to RP hematoma), PUD and HTN presented with chest pain during dialysis now resolved, pending nuclear stress test. Patient was also found to have early cirrhosis on CT scan.
Patient is a 57 year old woman with history of polycythemia vera and ESRD secondary to chronic glomerulonephritis on HD T/R/Sat (formerly on PD stopped due to RP hematoma), PUD and HTN presented with chest pain during dialysis now resolved.
Patient is a 57 year old woman with history of polycythemia vera and ESRD secondary to chronic glomerulonephritis on HD T/R/Sat (formerly on PD stopped due to RP hematoma), PUD and HTN presented with chest pain during dialysis now resolved with normal nuclear stress test. Patient was also found to have early cirrhosis on CT scan although RUQ ultrasound does not show cirrhotic change.

## 2018-03-16 NOTE — PROGRESS NOTE ADULT - PROBLEM SELECTOR PLAN 6
- Continue with Famotidine 20 mg daily
- Continue with Famotidine 20 mg daily
- Stable, not on hydroxyurea at home. Hct <45, Age <60, no indication for ASA at this time.   - Continue with monitor CBC or for signs of thrombotic phenomenon including CVA, MI, or vision change
- Continue with Famotidine 20 mg daily
- Continue with Famotidine 20 mg daily
- Continue with home dose Lasix 60 mg daily

## 2018-03-16 NOTE — PROGRESS NOTE ADULT - PROBLEM SELECTOR PROBLEM 4
Cirrhosis
Liver damage
Hyperkalemia
Chronic kidney disease-mineral and bone disorder
Chronic kidney disease-mineral and bone disorder
Cirrhosis
Cirrhosis
Polycythemia vera

## 2018-03-16 NOTE — PROGRESS NOTE ADULT - PROBLEM SELECTOR PROBLEM 2
Chest pain
ESRD (end stage renal disease) on dialysis
HTN (hypertension)
HTN (hypertension)
Chest pain

## 2018-03-16 NOTE — PROGRESS NOTE ADULT - PROBLEM SELECTOR PLAN 7
- Continue with home dose Lasix 60 mg daily
- Continue with home dose Lasix 60 mg daily
- Continue with Famotidine 20 mg daily
- Continue with home dose Lasix 60 mg daily
- Continue with home dose Lasix 60 mg daily
- DVT prophylaxis: low risk, patient ambulatory, no pharm VTE  - Diet: DASH renal    Stephanie Resendiz PGY-1  Internal Medicine HS Team 1  Pager [phone]

## 2018-03-16 NOTE — PROGRESS NOTE ADULT - PROVIDER SPECIALTY LIST ADULT
Internal Medicine
Internal Medicine
Nephrology
Nephrology
Internal Medicine

## 2018-03-16 NOTE — PROGRESS NOTE ADULT - PROBLEM SELECTOR PLAN 5
- Stable, not on hydroxyurea at home. Hct <45, Age <60, no indication for ASA at this time.   - Continue with monitor CBC or for signs of thrombotic phenomenon including CVA, MI, or vision change
- Stable, not on hydroxyurea at home   - Continue with monitor CBC or for signs of thrombotic phenomenon including CVA, MI, or vision change
- Cr at baseline, HD schedule T/R/Sat  - Follow up nephro for HD session   - Continue with Sodium bicarb 1300 mg TID
- Continue with Famotidine 20 mg daily
- Stable, not on hydroxyurea at home. Hct <45, Age <60, no indication for ASA at this time.   - Continue with monitor CBC or for signs of thrombotic phenomenon including CVA, MI, or vision change
- Stable, not on hydroxyurea at home. Hct <45, Age <60, no indication for ASA at this time.   - Continue with monitor CBC or for signs of thrombotic phenomenon including CVA, MI, or vision change

## 2018-03-16 NOTE — PROGRESS NOTE ADULT - PROBLEM SELECTOR PROBLEM 5
Polycythemia vera
Polycythemia vera
ESRD (end stage renal disease) on dialysis
Peptic ulcer disease
Polycythemia vera
Polycythemia vera

## 2018-03-16 NOTE — PROGRESS NOTE ADULT - PROBLEM SELECTOR PLAN 1
- With peaked T wave on EKG, likely secondary to renal failure, now improving with temporizing measures  - K this morning 5.5, ordered for Insulin, Dextrose, Albuterol and Kayexalate  - Appreciate nephro rec, may need HD today
- Patient has random episodes of back spasms only after dialysis, likely secondary to electrolyte derangement after HD   - Continue with pain control PRN
- With peaked T wave on EKG, likely secondary to renal failure, now improving with temporizing measures  - K this morning 4.2, continue HD per nephro, continue to monitor BMP
- In the setting of post dialysis now resolved likely secondary to fluid shift during HD; other differential included: PUD vs ACS vs aortic dissection  - Cardiac enzyme negative x3; no aortic dissection on imaging; EKG with no changes, but due to history of PV, patient has high risk of MI  - Pending inpatient stress test and TTE and continue with Famotidine for chest pain secondary to PUD
- Resolved, with peaked T wave on EKG, likely secondary to renal failure, now improving with temporizing measures and HD  - Continue HD per nephro, continue to monitor BMP
Gets HD (TTS) at [facility] dialysis [place] via LUE AVF.  seen during maintenance HD today, tolerating well.
Gets HD (TTS) at [facility] dialysis [place] via LUE AVF. Pt previously on PD and was switch to HD in setting of hemoperitoneum in 09/2017.  Seen during maintenance HD today, tolerating well.
- Resolved, with peaked T wave on EKG, likely secondary to renal failure, now improving with temporizing measures and HD  - Continue HD per nephro, continue to monitor BMP

## 2018-03-16 NOTE — PROGRESS NOTE ADULT - SUBJECTIVE AND OBJECTIVE BOX
CHIEF COMPLAINT: Chest pain during dialysis    Interval Events: Patient had an acute episode of back pain along with chest discomfort yesterday after dialysis and abdominal ultrasound, which she did not eat for the whole morning. EKG was performed and was negative for acute changes. Since she had extensive cardiac workup for ACS during this admission, cardiac enzyme was not obtained. Patient's pain improved with IV Dilaudid 0.5 mg. Overnight, she had no pain, she slept comfortably. She is seen and examined this morning, feels much better, no chest pain, no respiratory distress, wants to go home today.     OBJECTIVE:  Vital Signs Last 24 Hrs  T(C): 36.8 (16 Mar 2018 04:58), Max: 36.9 (15 Mar 2018 13:46)  T(F): 98.2 (16 Mar 2018 04:58), Max: 98.4 (15 Mar 2018 13:46)  HR: 71 (16 Mar 2018 04:58) (71 - 103)  BP: 130/80 (16 Mar 2018 04:58) (107/72 - 157/88)  BP(mean): --  RR: 18 (16 Mar 2018 04:58) (17 - 19)  SpO2: 95% (16 Mar 2018 04:58) (95% - 98%)    03-15 @ 07:01  -  03-16 @ 07:00  --------------------------------------------------------  IN: 545 mL / OUT: 1800 mL / NET: -1255 mL      CAPILLARY BLOOD GLUCOSE          PHYSICAL EXAM:  General: Alert and cooperative. Not in acute stress. Well developed, well nourished.   Head: Normocephalic, no mass and lesions.  Eyes: PERRLA. EOMI.   Respiratory: Bilateral lung clear to auscultation, no crackles, no wheezes, no rhonchi.   Cardiovascular: S1/S2 auscultated, no murmur, or gallop. Rhythm is regular. There is no peripheral edema.  Abdomen: Soft, non-tender, nondistended, no guarding or rebound tenderness. Active bowel sounds in all 4 quadrants.   Extremities: No significant deformity or joint abnormality. No peripheral edema.   Neurological: AAOx3. No focal deficit. Ambulating.       HOSPITAL MEDICATIONS:  heparin  Injectable 5000 Unit(s) SubCutaneous every 8 hours      furosemide    Tablet 60 milliGRAM(s) Oral daily          calcium carbonate 500 mG (Tums) Chewable 1 Tablet(s) Chew daily PRN  famotidine    Tablet 20 milliGRAM(s) Oral two times a day        doxercalciferol Injectable 1 MICROGram(s) IV Push <User Schedule>    epoetin filiberto Injectable 5000 Unit(s) IV Push <User Schedule>          LABS:    Hgb Trend: 10.6<--, 10.4<--, 12.3<--  03-15    138  |  98  |  46<H>  ----------------------------<  163<H>  4.3   |  19<L>  |  7.27<H>    Ca    8.8      15 Mar 2018 09:14  Phos  6.4     03-15  Mg     2.1     03-15      Creatinine Trend: 7.27<--, 5.94<--, 7.93<--, 7.70<--, 7.37<--, 7.27<--      RADIOLOGY:  < from: CT Angio Abdomen and Pelvis w/ IV Cont (03.10.18 @ 17:49) >  FINDINGS:    CHEST:     LUNGS AND LARGE AIRWAYS: Patent central airways. No pulmonary nodules.  PLEURA: No pleural effusion.  VESSELS: No evidence of aortic dissection or intramural hematoma.  HEART: Mild cardiomegaly. No pericardial effusion.  MEDIASTINUM AND DAVION: No lymphadenopathy.  CHEST WALL AND LOWER NECK: Within normal limits.    ABDOMEN AND PELVIS:    LIVER: Caudate hypertrophy and medial segment atrophy which may be seen   in early cirrhosis.  BILE DUCTS: Normal caliber.  GALLBLADDER: Within normal limits.  SPLEEN: Marked splenomegaly.  PANCREAS: Within normal limits.  ADRENALS: Within normal limits.  KIDNEYS/URETERS: Atrophic kidneys.    BLADDER: Within normal limits.  REPRODUCTIVE ORGANS: The uterus and adnexa are within normal limits.    BOWEL:No bowel obstruction. Appendix is normal.  PERITONEUM: No ascites. A previously characterized hematoma in the left   upper quadrant measures 2.7 x 2.1 cm, previously 5.5 x 5.0 cm.  VESSELS: Within normal limits.  RETROPERITONEUM: No lymphadenopathy.  ABDOMINAL WALL: Within normal limits.  BONES: Mild degenerative changes of the spine.    IMPRESSION:     No evidence of aortic dissection.    Marked splenomegaly with questionable early cirrhosis.    Left upper quadrant hematoma decreased in size or prior imaging   10/11/2017.      < end of copied text >    < from: Transthoracic Echocardiogram (03.12.18 @ 11:56) >  EF 51%  Conclusions:  1. Increased relative wall thickness with normal left  ventricular mass index, consistent with concentric left  ventricular remodeling.  2. Normal left ventricular systolic function. No segmental  wall motion abnormalities.    < end of copied text >    < from: US Abdomen Complete (03.15.18 @ 13:12) >  FINDINGS:    Liver: Within normal limits.    Bile ducts: Normal caliber. Common bile duct measures 5 mm.     Gallbladder: No evidence of wall thickening or cholelithiasis.        Pancreas: Visualized portions are within normal limits.    Spleen: Enlarged, measuring 17.6 cm. Scattered granulomas are seen.     Right kidney: 7.5 cm. atrophic    Left kidney: 5.9 cm.  atrophic.        Ascites: None.    Aorta and IVC: Visualized portions are within normal limits.    IMPRESSION:     No evidence of liver mass or nodularity. Splenomegaly. Atrophic kidneys.      < end of copied text >

## 2018-03-16 NOTE — PROGRESS NOTE ADULT - PROBLEM SELECTOR PROBLEM 1
Hyperkalemia
Chest pain
ESRD (end stage renal disease) on dialysis
ESRD (end stage renal disease) on dialysis
Hyperkalemia
Back spasm

## 2018-03-16 NOTE — PROGRESS NOTE ADULT - PROBLEM SELECTOR PLAN 2
- In the setting of post dialysis now resolved likely secondary to fluid shift during HD; other differential included: PUD vs ACS vs aortic dissection  - Cardiac enzyme negative x3; no aortic dissection on imaging; EKG with no changes, but due to history of PV, patient has high risk of MI  - Pending inpatient stress test and TTE and continue with Famotidine for chest pain secondary to PUD
- Resolved, in the setting of post dialysis now resolved likely secondary to fluid shift during HD; other differential included: PUD vs ACS vs aortic dissection  - Cardiac enzyme negative x3; no aortic dissection on imaging; EKG with no changes, but due to history of PV, patient has high risk of MI  - Normal nuclear stress test, no further cardiac workup indicated at this time
- In the setting of post dialysis now resolved likely secondary to fluid shift during HD; other differential included: PUD vs ACS vs aortic dissection  - Cardiac enzyme negative x3; no aortic dissection on imaging; EKG with no changes, but due to history of PV, patient has high risk of MI  - Pending inpatient nuclear stress test and continue with Famotidine for chest pain secondary to PUD
- Cr at baseline, HD schedule T/R/Sat  - Follow up nephro for HD session   - Continue with Sodium bicarb
- Resolved, in the setting of post dialysis now resolved likely secondary to fluid shift during HD; other differential included: PUD vs ACS vs aortic dissection  - Cardiac enzyme negative x3; no aortic dissection on imaging; EKG with no changes, but due to history of PV, patient has high risk of MI  - Normal nuclear stress test
continue Amlodipine 10 and lasix 60 every other day (on non HD days).
continue Amlodipine 10 and lasix 60 mg daily.
- Resolved, in the setting of post dialysis now resolved likely secondary to fluid shift during HD; other differential included: PUD vs ACS vs aortic dissection  - Cardiac enzyme negative x3; no aortic dissection on imaging; EKG with no changes, but due to history of PV, patient has high risk of MI  - Pending inpatient nuclear stress test and continue with Famotidine for chest pain secondary to PUD

## 2018-03-16 NOTE — PROGRESS NOTE ADULT - ATTENDING COMMENTS
57 year old woman with ESRD on HD presenting with chest pain.  HD today  No Cp  feels better
On HD  BP stable  Had a very brief episode of CP  epigastric- L chest  one minute or less  Feels well now for renal sono
NST today  abd sono to eval cirrhosis
NST today  dispo pending results
dvt ppx  renal for possible HD today  repeat BMP in afternoon  hyperglycemia? a1c 4.3 patient received d50 and also reg insulin 10 units- which is concerning   would place pt on aiss and monitor fs tid ac
time spent coordinating discharge 45 minutes
time spent coordinating d/c 45 min

## 2018-03-16 NOTE — PROGRESS NOTE ADULT - PROBLEM SELECTOR PLAN 3
- Cr at baseline, HD schedule T/R/Sat  - Follow up nephro for HD session   - Continue with Sodium bicarb 1300 mg TID
- Cr at baseline, HD schedule T/R/Sat  - Follow up nephro for HD session, possibly today as patient has peaked T wave on tele monitor  - Continue with Sodium bicarb 1300 mg TID
- CTA shows early cirrhosis . Patient with mild chronic hyperbilirubinemia and Alk phos elevation  - Anti-Smooth muscle Ab, AMA, microsomal Ab, ceruloplasmin all negative  - May benefit from outpatient hepatology follow up, abdominal ultrasound does not show liver nodularity or steatosis
- CTA shows early cirrhosis on CT. Patient with mild chronic hyperbilirubinemia and Alk phos elevation  - Previous hepatitis panel negative with borderline positive RAHUL  - Repeat Anti-Smooth muscle Ab, AMA, microsomal Ab  - May benefit from outpatient hepatology follow up
- Cr at baseline, HD schedule T/R/Sat  - Follow up nephro for HD session   - Continue with Sodium bicarb 1300 mg TID
- Cr at baseline, HD schedule T/R/Sat  - Follow up nephro for HD session   - Continue with Sodium bicarb 1300 mg TID
on Epo 5000 units IV tiw. Give Epo today.
on Epo 5000 units IV tiw. Give Epo today.

## 2018-03-16 NOTE — PROGRESS NOTE ADULT - PROBLEM SELECTOR PLAN 9
- DVT prophylaxis: Heparin Sub Q8h  - Diet: DASH renal  - Dispo: Discharge to home today.     Stephanie Resendiz PGY-1  Internal Medicine HS Team 1  Pager [phone]

## 2018-03-16 NOTE — PROGRESS NOTE ADULT - PROBLEM SELECTOR PROBLEM 6
Peptic ulcer disease
Peptic ulcer disease
Polycythemia vera
Hypertension
Peptic ulcer disease
Peptic ulcer disease

## 2018-03-16 NOTE — PROGRESS NOTE ADULT - PROBLEM SELECTOR PLAN 8
- DVT prophylaxis: Heparin Sub Q8h  - Diet: DASH renal  - Dispo: Pending nuclear stress test.     Stephanie Resendiz PGY-1  Internal Medicine HS Team 1  Pager [phone]
- DVT prophylaxis: low risk, patient ambulatory, no pharm VTE  - Diet: DASH renal    Stephanie Resendiz PGY-1  Internal Medicine HS Team 1  Pager [phone]
- Continue with home dose Lasix 60 mg daily
- DVT prophylaxis: Heparin Sub Q8h  - Diet: DASH renal  - Dispo: Pending abdominal ultrasound.     Stephanie Resendiz PGY-1  Internal Medicine HS Team 1  Pager [phone]
- DVT prophylaxis: Heparin Sub Q8h  - Diet: DASH renal  - Dispo: Pending nuclear stress test.     Stephanie Resendiz PGY-1  Internal Medicine HS Team 1  Pager [phone]

## 2018-03-16 NOTE — PROGRESS NOTE ADULT - PROBLEM SELECTOR PROBLEM 3
ESRD (end stage renal disease) on dialysis
Cirrhosis
ESRD (end stage renal disease) on dialysis
Anemia due to chronic kidney disease
Anemia due to chronic kidney disease
ESRD (end stage renal disease) on dialysis
Liver damage
ESRD (end stage renal disease) on dialysis

## 2018-03-16 NOTE — PROGRESS NOTE ADULT - PROBLEM SELECTOR PLAN 4
- CTA shows early cirrhosis . Patient with mild chronic hyperbilirubinemia and Alk phos elevation  - Previous hepatitis panel negative with borderline positive RAUHL,   - Pending Anti-Smooth muscle Ab, AMA, microsomal Ab, ceruloplasmin  - May benefit from outpatient hepatology follow up
- CTA shows early cirrhosis on CT. Patient with mild chronic hyperbilirubinemia and Alk phos elevation  - Previous hepatitis panel negative with borderline positive RAHUL  - Repeat Anti-Smooth muscle Ab, AMA, microsomal Ab  - May benefit from outpatient hepatology follow up
- Resolved, with peaked T wave on EKG, likely secondary to renal failure, now improving with temporizing measures and HD  - Continue HD per nephro, continue to monitor BMP
- CTA shows early cirrhosis . Patient with mild chronic hyperbilirubinemia and Alk phos elevation  - Anti-Smooth muscle Ab, AMA, microsomal Ab, ceruloplasmin all negative  - May benefit from outpatient hepatology follow up, pending abdominal ultrasound for evaluation of possible CALDWELL
- CTA shows early cirrhosis . Patient with mild chronic hyperbilirubinemia and Alk phos elevation  - Previous hepatitis panel negative with borderline positive RAHUL  - Repeat Anti-Smooth muscle Ab, AMA, microsomal Ab  - May benefit from outpatient hepatology follow up
- Stable, not on hydroxyurea at home   - Continue with monitor CBC or for signs of thrombotic phenomenon including CVA, MI, or vision change
continue calcium carbonate.   monitor phos levels.   Continue Hecterol 1 mcg IV tiw.
continue calcium carbonate.   monitor phos levels.   Continue Hecterol 1 mcg IV tiw.

## 2018-03-22 ENCOUNTER — EMERGENCY (EMERGENCY)
Facility: HOSPITAL | Age: 58
LOS: 1 days | Discharge: HOME | End: 2018-03-22
Attending: EMERGENCY MEDICINE | Admitting: EMERGENCY MEDICINE
Payer: MEDICAID

## 2018-03-22 VITALS
SYSTOLIC BLOOD PRESSURE: 133 MMHG | HEART RATE: 79 BPM | DIASTOLIC BLOOD PRESSURE: 87 MMHG | RESPIRATION RATE: 17 BRPM | OXYGEN SATURATION: 99 %

## 2018-03-22 VITALS
HEART RATE: 84 BPM | TEMPERATURE: 98 F | DIASTOLIC BLOOD PRESSURE: 81 MMHG | SYSTOLIC BLOOD PRESSURE: 117 MMHG | OXYGEN SATURATION: 95 % | RESPIRATION RATE: 20 BRPM

## 2018-03-22 DIAGNOSIS — I77.0 ARTERIOVENOUS FISTULA, ACQUIRED: Chronic | ICD-10-CM

## 2018-03-22 DIAGNOSIS — T85.898A OTHER SPECIFIED COMPLICATION OF OTHER INTERNAL PROSTHETIC DEVICES, IMPLANTS AND GRAFTS, INITIAL ENCOUNTER: Chronic | ICD-10-CM

## 2018-03-22 DIAGNOSIS — Z99.2 DEPENDENCE ON RENAL DIALYSIS: Chronic | ICD-10-CM

## 2018-03-22 LAB
ALBUMIN SERPL ELPH-MCNC: 4.8 G/DL — SIGNIFICANT CHANGE UP (ref 3.3–5)
ALP SERPL-CCNC: 158 U/L — HIGH (ref 40–120)
ALT FLD-CCNC: 8 U/L RC — LOW (ref 10–45)
ANION GAP SERPL CALC-SCNC: 17 MMOL/L — SIGNIFICANT CHANGE UP (ref 5–17)
AST SERPL-CCNC: 19 U/L — SIGNIFICANT CHANGE UP (ref 10–40)
BASOPHILS # BLD AUTO: 0.1 K/UL — SIGNIFICANT CHANGE UP (ref 0–0.2)
BASOPHILS NFR BLD AUTO: 0.6 % — SIGNIFICANT CHANGE UP (ref 0–2)
BILIRUB SERPL-MCNC: 1.7 MG/DL — HIGH (ref 0.2–1.2)
BUN SERPL-MCNC: 15 MG/DL — SIGNIFICANT CHANGE UP (ref 7–23)
CALCIUM SERPL-MCNC: 9.1 MG/DL — SIGNIFICANT CHANGE UP (ref 8.4–10.5)
CHLORIDE SERPL-SCNC: 93 MMOL/L — LOW (ref 96–108)
CO2 SERPL-SCNC: 25 MMOL/L — SIGNIFICANT CHANGE UP (ref 22–31)
CREAT SERPL-MCNC: 3.48 MG/DL — HIGH (ref 0.5–1.3)
EOSINOPHIL # BLD AUTO: 0.3 K/UL — SIGNIFICANT CHANGE UP (ref 0–0.5)
EOSINOPHIL NFR BLD AUTO: 2.5 % — SIGNIFICANT CHANGE UP (ref 0–6)
GLUCOSE SERPL-MCNC: 108 MG/DL — HIGH (ref 70–99)
HCT VFR BLD CALC: 44.5 % — SIGNIFICANT CHANGE UP (ref 34.5–45)
HGB BLD-MCNC: 13.7 G/DL — SIGNIFICANT CHANGE UP (ref 11.5–15.5)
LYMPHOCYTES # BLD AUTO: 1 K/UL — SIGNIFICANT CHANGE UP (ref 1–3.3)
LYMPHOCYTES # BLD AUTO: 8.3 % — LOW (ref 13–44)
MCHC RBC-ENTMCNC: 24.4 PG — LOW (ref 27–34)
MCHC RBC-ENTMCNC: 30.9 GM/DL — LOW (ref 32–36)
MCV RBC AUTO: 79.1 FL — LOW (ref 80–100)
MONOCYTES # BLD AUTO: 0.3 K/UL — SIGNIFICANT CHANGE UP (ref 0–0.9)
MONOCYTES NFR BLD AUTO: 2.3 % — SIGNIFICANT CHANGE UP (ref 2–14)
NEUTROPHILS # BLD AUTO: 10.5 K/UL — HIGH (ref 1.8–7.4)
NEUTROPHILS NFR BLD AUTO: 86.2 % — HIGH (ref 43–77)
NT-PROBNP SERPL-SCNC: 2155 PG/ML — HIGH (ref 0–300)
PLATELET # BLD AUTO: 235 K/UL — SIGNIFICANT CHANGE UP (ref 150–400)
POTASSIUM SERPL-MCNC: 4.5 MMOL/L — SIGNIFICANT CHANGE UP (ref 3.5–5.3)
POTASSIUM SERPL-SCNC: 4.5 MMOL/L — SIGNIFICANT CHANGE UP (ref 3.5–5.3)
PROT SERPL-MCNC: 9.3 G/DL — HIGH (ref 6–8.3)
RBC # BLD: 5.63 M/UL — HIGH (ref 3.8–5.2)
RBC # FLD: 23.3 % — HIGH (ref 10.3–14.5)
SODIUM SERPL-SCNC: 135 MMOL/L — SIGNIFICANT CHANGE UP (ref 135–145)
TROPONIN T SERPL-MCNC: <0.01 NG/ML — SIGNIFICANT CHANGE UP (ref 0–0.06)
TROPONIN T SERPL-MCNC: <0.01 NG/ML — SIGNIFICANT CHANGE UP (ref 0–0.06)
WBC # BLD: 12.1 K/UL — HIGH (ref 3.8–10.5)
WBC # FLD AUTO: 12.1 K/UL — HIGH (ref 3.8–10.5)

## 2018-03-22 PROCEDURE — 85027 COMPLETE CBC AUTOMATED: CPT

## 2018-03-22 PROCEDURE — 83690 ASSAY OF LIPASE: CPT

## 2018-03-22 PROCEDURE — 99283 EMERGENCY DEPT VISIT LOW MDM: CPT | Mod: 25

## 2018-03-22 PROCEDURE — 71046 X-RAY EXAM CHEST 2 VIEWS: CPT

## 2018-03-22 PROCEDURE — 99284 EMERGENCY DEPT VISIT MOD MDM: CPT | Mod: 25

## 2018-03-22 PROCEDURE — 93005 ELECTROCARDIOGRAM TRACING: CPT

## 2018-03-22 PROCEDURE — 93010 ELECTROCARDIOGRAM REPORT: CPT

## 2018-03-22 PROCEDURE — 83880 ASSAY OF NATRIURETIC PEPTIDE: CPT

## 2018-03-22 PROCEDURE — 71046 X-RAY EXAM CHEST 2 VIEWS: CPT | Mod: 26

## 2018-03-22 PROCEDURE — 84484 ASSAY OF TROPONIN QUANT: CPT

## 2018-03-22 PROCEDURE — 80053 COMPREHEN METABOLIC PANEL: CPT

## 2018-03-22 NOTE — ED ADULT NURSE NOTE - OBJECTIVE STATEMENT
57 y f came to the ed by ems for chest pain. states she had dialysis today and had chest pain began shorty after at 250pm. took tylenol at 3pm and her pain resolved. patient denies any pain right now. states she was recently in the hospital for a week doing various tests for the same pain but was discharged when every test was negative. denied any sob currently or with the pain. patient is a/ox3. skin is warm and dry. denies n/v/d.

## 2018-03-22 NOTE — ED PROVIDER NOTE - NS ED ROS FT
ROS: denies HA, weakness, dizziness, fevers/chills, nausea/vomiting, SOB, diaphoresis, abdominal pain, neck pain, dysuria/hematuria, or rash  +back pain, chest pain

## 2018-03-22 NOTE — ED PROVIDER NOTE - MEDICAL DECISION MAKING DETAILS
57 y.o. female pw chest and back pain. Same pain as before. Will check labs, cxr, ekg, likely delta trop and dc home.

## 2018-03-22 NOTE — CONSULT NOTE ADULT - ASSESSMENT
57F with polycythemia vera (off hydroxyurea), ESRD on HD TThSat (formerly on PD), PUD, HTN presents with CP. Similar to prior chest pain, reproducible with palpation. Likely musculoskeletal vs renal osteodystrophic changes.    -- no indication for cath  -- would defer DAPT currently  -- no heparin gtt  -- would not start metoprolol    Lopez Mcallister MD

## 2018-03-22 NOTE — ED PROVIDER NOTE - CARE PLAN
Principal Discharge DX:	Chest pain  Assessment and plan of treatment:	1) Please follow-up with your Primary Medical Doctor in 3-5 days. If you need to find a new physician, please call [phone].  2) Return to the Emergency Department if you experiences: fevers, chills, chest pain, shortness of breath, or symptoms that are new or recurrent.  3) If you have any questions or concerns, do not hesitate to contact us at [phone].  4) Take Aspirin 81 mg daily until you follow up with the Cardiologist in 1-3 days.

## 2018-03-22 NOTE — ED PROVIDER NOTE - OBJECTIVE STATEMENT
57 y.o. female ESRD on HD pw chest pain and back pain. Recently admitted and had neg workup including CTA c/a/p, stress, RUQ US. Pain started again while at dialysis. Same pain as before. No nausea or vomiting.

## 2018-03-22 NOTE — CONSULT NOTE ADULT - SUBJECTIVE AND OBJECTIVE BOX
Patient seen and evaluated @ 6:30 PM  Chief Complaint: Chest pain    HPI:  57F with polycythemia vera (off hydroxyurea), ESRD on HD TThSat (formerly on PD), PUD, HTN presents with CP.    Prior admission     PMH:   ESRD on peritoneal dialysis  Splenic infarct  Splenomegaly  Hypertension  Peptic ulcer disease  Polycythemia vera    PSH:   AV fistula  Hemoperitoneum as complication of peritoneal dialysis  Peritoneal dialysis catheter in place  No significant past surgical history    Medications:     Allergies:  No Known Allergies    FAMILY HISTORY:  Family history of malignant neoplasm (Grandparent): head and neck in father  Family history of hypertension in mother    Social History:  Smoking:  Alcohol:  Drugs:    Review of Systems:  Constitutional: [ ] Fever [ ] Chills [ ] Fatigue [ ] Weight change   HEENT: [ ] Blurred vision [ ] Eye Pain [ ] Headache [ ] Runny nose [ ] Sore Throat   Respiratory: [ ] Cough [ ] Wheezing [ ] Shortness of breath  Cardiovascular: [ ] Chest Pain [ ] Palpitations [ ] SANTAMARIA [ ] PND [ ] Orthopnea  Gastrointestinal: [ ] Abdominal Pain [ ] Diarrhea [ ] Constipation [ ] Hemorrhoids [ ] Nausea [ ] Vomiting  Genitourinary: [ ] Nocturia [ ] Dysuria [ ] Incontinence  Extremities: [ ] Swelling [ ] Joint Pain  Neurologic: [ ] Focal deficit [ ] Paresthesias [ ] Syncope  Lymphatic: [ ] Swelling [ ] Lymphadenopathy   Skin: [ ] Rash [ ] Ecchymoses [ ] Wounds [ ] Lesions  Psychiatry: [ ] Depression [ ] Suicidal/Homicidal Ideation [ ] Anxiety [ ] Sleep Disturbances  [ ] 10 point review of systems is otherwise negative except as mentioned above            [ ]Unable to obtain    Physical Exam:  T(C): 36.7 (03-22-18 @ 17:05), Max: 36.7 (03-22-18 @ 17:05)  HR: 84 (03-22-18 @ 17:05) (84 - 84)  BP: 117/81 (03-22-18 @ 17:05) (117/81 - 117/81)  RR: 20 (03-22-18 @ 17:05) (20 - 20)  SpO2: 95% (03-22-18 @ 17:05) (95% - 95%)  Wt(kg): --    Daily     Daily     Appearance: NAD  Eyes: PERRL, EOMI  HENT: Normal oral muscosa, NC/AT  Cardiovascular: normal S1 and S2, RRR, no m/r/g, no edema, normal JVP  Procedural Access Site:  No hematoma, non-tender to palpation, 2+ pulses distally, no bruit, no ecchymosis  Respiratory: Clear to auscultation bilaterally  Gastrointestinal: Soft, non-tender, non-distended, BS+  Musculoskeletal: No clubbing, no joint deformity   Neurologic: Non-focal  Lymphatic: No lymphadenopathy  Psychiatry: AAOx3, mood & affect appropriate  Skin: No rashes, no ecchymoses, no cyanosis    Cardiovascular Diagnostic Testing:  ECG:    Echo:    Stress Testing:    Cath:    Interpretation of Telemetry:    Imaging:    Labs:                        13.7   12.1  )-----------( 235      ( 22 Mar 2018 17:20 )             44.5     03-22    135  |  93<L>  |  15  ----------------------------<  108<H>  4.5   |  25  |  3.48<H>    Ca    9.1      22 Mar 2018 17:20    TPro  9.3<H>  /  Alb  4.8  /  TBili  1.7<H>  /  DBili  x   /  AST  19  /  ALT  8<L>  /  AlkPhos  158<H>  03-22      CARDIAC MARKERS ( 22 Mar 2018 17:20 )  x     / <0.01 ng/mL / x     / x     / x          Serum Pro-Brain Natriuretic Peptide: 2155 pg/mL (03-22 @ 17:20) Patient seen and evaluated @ 6:30 PM  Chief Complaint: Chest pain    HPI:  57F with polycythemia vera (off hydroxyurea), ESRD on HD TThSat (formerly on PD), PUD, HTN presents with CP.    Prior admission had chest pain with HD, 9/10 chest pressure radiating to flanks. Lasted about 1 hour and improved with tylenol. Also received nitroglycerin. Admitted to r/o ACS. Enzymes negative, TTE with EF 51% no SWMA, normal nuclear stress test. Treated for reflux.     Patient presenting with chest pain following HD sessions. Same as before. Always occurs with HD sessions toward the end of session. Resolving in the ED currently at 1/10.    PMH:   ESRD on peritoneal dialysis  Splenic infarct  Splenomegaly  Hypertension  Peptic ulcer disease  Polycythemia vera    PSH:   AV fistula  Hemoperitoneum as complication of peritoneal dialysis  Peritoneal dialysis catheter in place  No significant past surgical history    Medications:   Lasix  Famotidine    Allergies:  No Known Allergies    FAMILY HISTORY:  Family history of malignant neoplasm (Grandparent): head and neck in father  Family history of hypertension in mother    Social History:  1/4 ppd smoker, quit 1-2 years ago  No EtOH  No illicits    Review of Systems:  Constitutional: [ ] Fever [ ] Chills [ ] Fatigue [ ] Weight change   HEENT: [ ] Blurred vision [ ] Eye Pain [ ] Headache [ ] Runny nose [ ] Sore Throat   Respiratory: [ ] Cough [ ] Wheezing [ ] Shortness of breath  Cardiovascular: [x] Chest Pain [ ] Palpitations [ ] SANTAMARIA [ ] PND [ ] Orthopnea  Gastrointestinal: [ ] Abdominal Pain [ ] Diarrhea [ ] Constipation [ ] Hemorrhoids [ ] Nausea [ ] Vomiting  Genitourinary: [ ] Nocturia [ ] Dysuria [ ] Incontinence  Extremities: [ ] Swelling [ ] Joint Pain  Neurologic: [ ] Focal deficit [ ] Paresthesias [ ] Syncope  Lymphatic: [ ] Swelling [ ] Lymphadenopathy   Skin: [ ] Rash [ ] Ecchymoses [ ] Wounds [ ] Lesions  Psychiatry: [ ] Depression [ ] Suicidal/Homicidal Ideation [ ] Anxiety [ ] Sleep Disturbances  [ ] 10 point review of systems is otherwise negative except as mentioned above            [ ]Unable to obtain    Physical Exam:  T(C): 36.7 (03-22-18 @ 17:05), Max: 36.7 (03-22-18 @ 17:05)  HR: 84 (03-22-18 @ 17:05) (84 - 84)  BP: 117/81 (03-22-18 @ 17:05) (117/81 - 117/81)  RR: 20 (03-22-18 @ 17:05) (20 - 20)  SpO2: 95% (03-22-18 @ 17:05) (95% - 95%)  Wt(kg): --    Daily     Daily     Appearance: NAD  Eyes: PERRL, EOMI  HENT: Normal oral muscosa, NC/AT  Cardiovascular: normal S1 and S2, RRR, no m/r/g, no edema, normal JVP  Respiratory: Clear to auscultation bilaterally  Gastrointestinal: Soft, non-tender, non-distended, BS+  Musculoskeletal: No clubbing, no joint deformity   Neurologic: Non-focal  Lymphatic: No lymphadenopathy  Psychiatry: AAOx3, mood & affect appropriate  Skin: No rashes, no ecchymoses, no cyanosis    Cardiovascular Diagnostic Testing:  ECG: sinus 84 bpm, TWI V1-V2 stable from prior, no ischemic EKG changes    Echo:  3/12/2018  TTE EF 51%  Conclusions:  1. Increased relative wall thickness with normal left  ventricular mass index, consistent with concentric left  ventricular remodeling.  2. Normal left ventricular systolic function. No segmental  wall motion abnormalities.    Stress Testing:  3/14/2018  IMPRESSIONS:Normal Study  * Chest Pain: No chest pain with administration of  Regadenoson.  * Symptom: Shortness of breath,abd pain.  * HR Response: Appropriate.  * BP Response: Appropriate.  * Heart Rhythm: Normal Sinus Rhythm - 86 BPM.  * Baseline ECG:Nonspecific ST-T wave abnormality.  * ECG Abnormalities: None.  * ECG Changes: No ischemic ST segment changes beyond  baseline abnormalities.  * Arrhythmia: None.  * Review of raw data shows: The study is of good technical  quality.  * The left ventricle was normal in size. Normal myocardial  perfusion scan, with no evidence of infarction or  inducible ischemia.  * Post-stress gated wall motion analysis was performed  (LVEF = 61 %;LVEDV = 68 ml.), revealing normal LV  function.    Cath:  none    Interpretation of Telemetry:  sinus    Imaging:  CXR [facility]    Labs:                        13.7   12.1  )-----------( 235      ( 22 Mar 2018 17:20 )             44.5     03-22    135  |  93<L>  |  15  ----------------------------<  108<H>  4.5   |  25  |  3.48<H>    Ca    9.1      22 Mar 2018 17:20    TPro  9.3<H>  /  Alb  4.8  /  TBili  1.7<H>  /  DBili  x   /  AST  19  /  ALT  8<L>  /  AlkPhos  158<H>  03-22      CARDIAC MARKERS ( 22 Mar 2018 17:20 )  x     / <0.01 ng/mL / x     / x     / x          Serum Pro-Brain Natriuretic Peptide: 2155 pg/mL (03-22 @ 17:20)

## 2018-03-22 NOTE — ED PROVIDER NOTE - PHYSICAL EXAMINATION
Gen: Well appearing, NAD  Head: NCAT  HEENT: MMM, Normal conjunctiva  Lung: CTAB, no rales, rhonchi or wheezing  CV: RRR, no murmurs, rubs or gallops  Abd: soft, +epigastric tenderness, no rebound or guarding  MSK: No CVA tenderness. No edema, no visible deformities  Neuro: No focal neurologic deficits.   Skin: Warm and dry, no evidence of rash  Psych: normal mood and affect, A&Ox3

## 2018-03-22 NOTE — ED PROVIDER NOTE - PLAN OF CARE
1) Please follow-up with your Primary Medical Doctor in 3-5 days. If you need to find a new physician, please call [phone].  2) Return to the Emergency Department if you experiences: fevers, chills, chest pain, shortness of breath, or symptoms that are new or recurrent.  3) If you have any questions or concerns, do not hesitate to contact us at [phone].  4) Take Aspirin 81 mg daily until you follow up with the Cardiologist in 1-3 days.

## 2018-03-22 NOTE — ED PROVIDER NOTE - PROGRESS NOTE DETAILS
Jose Baltazar MD (resident): patient comfortable, no CP or back pain at this time. had completed most of HD session today. no signs of fluid overload, no indications for HD at this time. had previous work-up for similar symptoms. stable for d/c and given rapid cards f/u form.

## 2018-03-22 NOTE — ED PROVIDER NOTE - ATTENDING CONTRIBUTION TO CARE
58 yo F presents with chset pain that started near the end of her dialysis session at 2:00pm. she did complete dialysis. she complains of midsternal chest pressure 10/10 initially, a/w diaphoresis and sob. resolved in 1 hour. resolved at this time. patient was recently admitted to the hospital for similar chest pain. nuclear stress test 2/11 normal. CTA c/a/p with no dissection.   PE L dialysis fistula patent with thrill. lungs cta. no abdominal TTP.

## 2018-03-28 ENCOUNTER — APPOINTMENT (OUTPATIENT)
Age: 58
End: 2018-03-28
Payer: MEDICAID

## 2018-03-28 PROCEDURE — 36902Z: CUSTOM

## 2018-03-28 PROCEDURE — 36907Z: CUSTOM | Mod: 59

## 2018-03-30 ENCOUNTER — APPOINTMENT (OUTPATIENT)
Dept: INTERNAL MEDICINE | Facility: CLINIC | Age: 58
End: 2018-03-30

## 2018-03-30 ENCOUNTER — OUTPATIENT (OUTPATIENT)
Dept: OUTPATIENT SERVICES | Facility: HOSPITAL | Age: 58
LOS: 1 days | End: 2018-03-30
Payer: SELF-PAY

## 2018-03-30 VITALS
HEIGHT: 60 IN | DIASTOLIC BLOOD PRESSURE: 70 MMHG | BODY MASS INDEX: 22.58 KG/M2 | WEIGHT: 115 LBS | HEART RATE: 68 BPM | SYSTOLIC BLOOD PRESSURE: 130 MMHG

## 2018-03-30 DIAGNOSIS — Z87.898 PERSONAL HISTORY OF OTHER SPECIFIED CONDITIONS: ICD-10-CM

## 2018-03-30 DIAGNOSIS — I10 ESSENTIAL (PRIMARY) HYPERTENSION: ICD-10-CM

## 2018-03-30 DIAGNOSIS — Z86.39 PERSONAL HISTORY OF OTHER ENDOCRINE, NUTRITIONAL AND METABOLIC DISEASE: ICD-10-CM

## 2018-03-30 DIAGNOSIS — Z87.2 PERSONAL HISTORY OF DISEASES OF THE SKIN AND SUBCUTANEOUS TISSUE: ICD-10-CM

## 2018-03-30 DIAGNOSIS — T85.898A OTHER SPECIFIED COMPLICATION OF OTHER INTERNAL PROSTHETIC DEVICES, IMPLANTS AND GRAFTS, INITIAL ENCOUNTER: Chronic | ICD-10-CM

## 2018-03-30 DIAGNOSIS — N18.6 END STAGE RENAL DISEASE: ICD-10-CM

## 2018-03-30 DIAGNOSIS — I77.0 ARTERIOVENOUS FISTULA, ACQUIRED: Chronic | ICD-10-CM

## 2018-03-30 DIAGNOSIS — N18.5 CHRONIC KIDNEY DISEASE, STAGE 5: ICD-10-CM

## 2018-03-30 DIAGNOSIS — M79.604 PAIN IN RIGHT LEG: ICD-10-CM

## 2018-03-30 DIAGNOSIS — Z99.2 DEPENDENCE ON RENAL DIALYSIS: Chronic | ICD-10-CM

## 2018-03-30 DIAGNOSIS — Z99.2 END STAGE RENAL DISEASE: ICD-10-CM

## 2018-03-30 PROCEDURE — G0463: CPT

## 2018-04-02 ENCOUNTER — RESULT REVIEW (OUTPATIENT)
Age: 58
End: 2018-04-02

## 2018-04-02 LAB
HBV SURFACE AB SER QL: NONREACTIVE
HBV SURFACE AB SERPL IA-ACNC: <3 MIU/ML

## 2018-04-04 DIAGNOSIS — F32.9 MAJOR DEPRESSIVE DISORDER, SINGLE EPISODE, UNSPECIFIED: ICD-10-CM

## 2018-04-04 DIAGNOSIS — N18.6 END STAGE RENAL DISEASE: ICD-10-CM

## 2018-04-04 DIAGNOSIS — R07.89 OTHER CHEST PAIN: ICD-10-CM

## 2018-05-04 ENCOUNTER — INPATIENT (INPATIENT)
Facility: HOSPITAL | Age: 58
LOS: 2 days | Discharge: HOME | DRG: 299 | End: 2018-05-07
Attending: HOSPITALIST | Admitting: HOSPITALIST
Payer: MEDICAID

## 2018-05-04 VITALS
HEART RATE: 70 BPM | RESPIRATION RATE: 24 BRPM | DIASTOLIC BLOOD PRESSURE: 86 MMHG | TEMPERATURE: 98 F | SYSTOLIC BLOOD PRESSURE: 200 MMHG | OXYGEN SATURATION: 99 %

## 2018-05-04 DIAGNOSIS — D45 POLYCYTHEMIA VERA: ICD-10-CM

## 2018-05-04 DIAGNOSIS — R16.1 SPLENOMEGALY, NOT ELSEWHERE CLASSIFIED: ICD-10-CM

## 2018-05-04 DIAGNOSIS — R10.9 UNSPECIFIED ABDOMINAL PAIN: ICD-10-CM

## 2018-05-04 DIAGNOSIS — E87.2 ACIDOSIS: ICD-10-CM

## 2018-05-04 DIAGNOSIS — N18.6 END STAGE RENAL DISEASE: ICD-10-CM

## 2018-05-04 DIAGNOSIS — Z99.2 DEPENDENCE ON RENAL DIALYSIS: Chronic | ICD-10-CM

## 2018-05-04 DIAGNOSIS — I95.9 HYPOTENSION, UNSPECIFIED: ICD-10-CM

## 2018-05-04 DIAGNOSIS — T85.898A OTHER SPECIFIED COMPLICATION OF OTHER INTERNAL PROSTHETIC DEVICES, IMPLANTS AND GRAFTS, INITIAL ENCOUNTER: Chronic | ICD-10-CM

## 2018-05-04 DIAGNOSIS — K74.60 UNSPECIFIED CIRRHOSIS OF LIVER: ICD-10-CM

## 2018-05-04 DIAGNOSIS — Z29.9 ENCOUNTER FOR PROPHYLACTIC MEASURES, UNSPECIFIED: ICD-10-CM

## 2018-05-04 DIAGNOSIS — K86.2 CYST OF PANCREAS: ICD-10-CM

## 2018-05-04 DIAGNOSIS — I77.0 ARTERIOVENOUS FISTULA, ACQUIRED: Chronic | ICD-10-CM

## 2018-05-04 DIAGNOSIS — K27.9 PEPTIC ULCER, SITE UNSPECIFIED, UNSPECIFIED AS ACUTE OR CHRONIC, WITHOUT HEMORRHAGE OR PERFORATION: ICD-10-CM

## 2018-05-04 LAB
ALBUMIN SERPL ELPH-MCNC: 4.7 G/DL — SIGNIFICANT CHANGE UP (ref 3.3–5)
ALP SERPL-CCNC: 189 U/L — HIGH (ref 40–120)
ALT FLD-CCNC: 14 U/L — SIGNIFICANT CHANGE UP (ref 10–45)
ANION GAP SERPL CALC-SCNC: 21 MMOL/L — HIGH (ref 5–17)
AST SERPL-CCNC: 21 U/L — SIGNIFICANT CHANGE UP (ref 10–40)
BASOPHILS # BLD AUTO: 0.1 K/UL — SIGNIFICANT CHANGE UP (ref 0–0.2)
BASOPHILS NFR BLD AUTO: 1 % — SIGNIFICANT CHANGE UP (ref 0–2)
BILIRUB SERPL-MCNC: 1.4 MG/DL — HIGH (ref 0.2–1.2)
BUN SERPL-MCNC: 35 MG/DL — HIGH (ref 7–23)
CALCIUM SERPL-MCNC: 9.6 MG/DL — SIGNIFICANT CHANGE UP (ref 8.4–10.5)
CHLORIDE SERPL-SCNC: 90 MMOL/L — LOW (ref 96–108)
CO2 SERPL-SCNC: 28 MMOL/L — SIGNIFICANT CHANGE UP (ref 22–31)
CREAT SERPL-MCNC: 5.21 MG/DL — HIGH (ref 0.5–1.3)
EOSINOPHIL # BLD AUTO: 0.2 K/UL — SIGNIFICANT CHANGE UP (ref 0–0.5)
EOSINOPHIL NFR BLD AUTO: 2 % — SIGNIFICANT CHANGE UP (ref 0–6)
GAS PNL BLDV: SIGNIFICANT CHANGE UP
GLUCOSE SERPL-MCNC: 134 MG/DL — HIGH (ref 70–99)
HCT VFR BLD CALC: 53.4 % — HIGH (ref 34.5–45)
HGB BLD-MCNC: 16.8 G/DL — HIGH (ref 11.5–15.5)
LIDOCAIN IGE QN: 92 U/L — HIGH (ref 7–60)
LYMPHOCYTES # BLD AUTO: 1.6 K/UL — SIGNIFICANT CHANGE UP (ref 1–3.3)
LYMPHOCYTES # BLD AUTO: 6 % — LOW (ref 13–44)
MCHC RBC-ENTMCNC: 25.1 PG — LOW (ref 27–34)
MCHC RBC-ENTMCNC: 31.4 GM/DL — LOW (ref 32–36)
MCV RBC AUTO: 80 FL — SIGNIFICANT CHANGE UP (ref 80–100)
MONOCYTES # BLD AUTO: 0.7 K/UL — SIGNIFICANT CHANGE UP (ref 0–0.9)
MONOCYTES NFR BLD AUTO: 4 % — SIGNIFICANT CHANGE UP (ref 2–14)
NEUTROPHILS # BLD AUTO: 16 K/UL — HIGH (ref 1.8–7.4)
NEUTROPHILS NFR BLD AUTO: 78 % — HIGH (ref 43–77)
PLATELET # BLD AUTO: 309 K/UL — SIGNIFICANT CHANGE UP (ref 150–400)
POTASSIUM SERPL-MCNC: 4.7 MMOL/L — SIGNIFICANT CHANGE UP (ref 3.5–5.3)
POTASSIUM SERPL-SCNC: 4.7 MMOL/L — SIGNIFICANT CHANGE UP (ref 3.5–5.3)
PROT SERPL-MCNC: 8.8 G/DL — HIGH (ref 6–8.3)
RBC # BLD: 6.67 M/UL — HIGH (ref 3.8–5.2)
RBC # FLD: 22.2 % — HIGH (ref 10.3–14.5)
SODIUM SERPL-SCNC: 139 MMOL/L — SIGNIFICANT CHANGE UP (ref 135–145)
WBC # BLD: 17.7 K/UL — HIGH (ref 3.8–10.5)
WBC # FLD AUTO: 17.7 K/UL — HIGH (ref 3.8–10.5)

## 2018-05-04 PROCEDURE — 93010 ELECTROCARDIOGRAM REPORT: CPT

## 2018-05-04 PROCEDURE — 76705 ECHO EXAM OF ABDOMEN: CPT | Mod: 26,RT

## 2018-05-04 PROCEDURE — 99285 EMERGENCY DEPT VISIT HI MDM: CPT | Mod: 25

## 2018-05-04 PROCEDURE — 99223 1ST HOSP IP/OBS HIGH 75: CPT | Mod: GC

## 2018-05-04 PROCEDURE — 74177 CT ABD & PELVIS W/CONTRAST: CPT | Mod: 26

## 2018-05-04 PROCEDURE — 71045 X-RAY EXAM CHEST 1 VIEW: CPT | Mod: 26

## 2018-05-04 RX ORDER — ONDANSETRON 8 MG/1
4 TABLET, FILM COATED ORAL ONCE
Qty: 0 | Refills: 0 | Status: COMPLETED | OUTPATIENT
Start: 2018-05-04 | End: 2018-05-04

## 2018-05-04 RX ORDER — MORPHINE SULFATE 50 MG/1
4 CAPSULE, EXTENDED RELEASE ORAL ONCE
Qty: 0 | Refills: 0 | Status: DISCONTINUED | OUTPATIENT
Start: 2018-05-04 | End: 2018-05-04

## 2018-05-04 RX ORDER — FAMOTIDINE 10 MG/ML
20 INJECTION INTRAVENOUS EVERY 24 HOURS
Qty: 0 | Refills: 0 | Status: DISCONTINUED | OUTPATIENT
Start: 2018-05-04 | End: 2018-05-07

## 2018-05-04 RX ORDER — SODIUM CHLORIDE 9 MG/ML
500 INJECTION, SOLUTION INTRAVENOUS ONCE
Qty: 0 | Refills: 0 | Status: COMPLETED | OUTPATIENT
Start: 2018-05-04 | End: 2018-05-04

## 2018-05-04 RX ORDER — CALCIUM CARBONATE 500(1250)
1 TABLET ORAL
Qty: 0 | Refills: 0 | COMMUNITY

## 2018-05-04 RX ORDER — PIPERACILLIN AND TAZOBACTAM 4; .5 G/20ML; G/20ML
3.38 INJECTION, POWDER, LYOPHILIZED, FOR SOLUTION INTRAVENOUS EVERY 12 HOURS
Qty: 0 | Refills: 0 | Status: DISCONTINUED | OUTPATIENT
Start: 2018-05-04 | End: 2018-05-05

## 2018-05-04 RX ORDER — HEPARIN SODIUM 5000 [USP'U]/ML
5000 INJECTION INTRAVENOUS; SUBCUTANEOUS EVERY 8 HOURS
Qty: 0 | Refills: 0 | Status: DISCONTINUED | OUTPATIENT
Start: 2018-05-04 | End: 2018-05-07

## 2018-05-04 RX ORDER — SODIUM CHLORIDE 9 MG/ML
500 INJECTION INTRAMUSCULAR; INTRAVENOUS; SUBCUTANEOUS ONCE
Qty: 0 | Refills: 0 | Status: COMPLETED | OUTPATIENT
Start: 2018-05-04 | End: 2018-05-04

## 2018-05-04 RX ORDER — PIPERACILLIN AND TAZOBACTAM 4; .5 G/20ML; G/20ML
3.38 INJECTION, POWDER, LYOPHILIZED, FOR SOLUTION INTRAVENOUS ONCE
Qty: 0 | Refills: 0 | Status: COMPLETED | OUTPATIENT
Start: 2018-05-04 | End: 2018-05-04

## 2018-05-04 RX ORDER — SODIUM CHLORIDE 9 MG/ML
1000 INJECTION, SOLUTION INTRAVENOUS ONCE
Qty: 0 | Refills: 0 | Status: COMPLETED | OUTPATIENT
Start: 2018-05-04 | End: 2018-05-04

## 2018-05-04 RX ADMIN — MORPHINE SULFATE 4 MILLIGRAM(S): 50 CAPSULE, EXTENDED RELEASE ORAL at 06:02

## 2018-05-04 RX ADMIN — MORPHINE SULFATE 4 MILLIGRAM(S): 50 CAPSULE, EXTENDED RELEASE ORAL at 06:47

## 2018-05-04 RX ADMIN — PIPERACILLIN AND TAZOBACTAM 200 GRAM(S): 4; .5 INJECTION, POWDER, LYOPHILIZED, FOR SOLUTION INTRAVENOUS at 06:43

## 2018-05-04 RX ADMIN — FAMOTIDINE 20 MILLIGRAM(S): 10 INJECTION INTRAVENOUS at 17:06

## 2018-05-04 RX ADMIN — PIPERACILLIN AND TAZOBACTAM 25 GRAM(S): 4; .5 INJECTION, POWDER, LYOPHILIZED, FOR SOLUTION INTRAVENOUS at 18:37

## 2018-05-04 RX ADMIN — HEPARIN SODIUM 5000 UNIT(S): 5000 INJECTION INTRAVENOUS; SUBCUTANEOUS at 22:39

## 2018-05-04 RX ADMIN — ONDANSETRON 4 MILLIGRAM(S): 8 TABLET, FILM COATED ORAL at 05:16

## 2018-05-04 RX ADMIN — SODIUM CHLORIDE 1000 MILLILITER(S): 9 INJECTION, SOLUTION INTRAVENOUS at 12:25

## 2018-05-04 RX ADMIN — SODIUM CHLORIDE 1000 MILLILITER(S): 9 INJECTION, SOLUTION INTRAVENOUS at 16:12

## 2018-05-04 RX ADMIN — MORPHINE SULFATE 4 MILLIGRAM(S): 50 CAPSULE, EXTENDED RELEASE ORAL at 05:23

## 2018-05-04 RX ADMIN — HEPARIN SODIUM 5000 UNIT(S): 5000 INJECTION INTRAVENOUS; SUBCUTANEOUS at 17:05

## 2018-05-04 RX ADMIN — SODIUM CHLORIDE 500 MILLILITER(S): 9 INJECTION INTRAMUSCULAR; INTRAVENOUS; SUBCUTANEOUS at 05:16

## 2018-05-04 NOTE — CHART NOTE - NSCHARTNOTEFT_GEN_A_CORE
Medicine Chart Update- PGY-2    Was called by Radiology physician Dr. Alf Forde who called to inquire MRCP order. 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 Dr. Forde informed that MRCP would not be indicated as CT of the A/P is superior to MRCP in the detection of pancreatic cyst and portal vein/hepatic vein/splenic vein thrombosis and that MRCP given current CT scan as well as previous CT scans did not suggest presence of above pathologies. Will d/c MRCP.    Steve Marquez MD PGY-2  Day Admit Resident  [facility]ra [id number]

## 2018-05-04 NOTE — H&P ADULT - NSHPOUTPATIENTPROVIDERS_GEN_ALL_CORE
PCP: Resident [place] [id number] [facility], Dr. Laure Guo  Nephrology: [place] [facility], Dr. Azucena De León

## 2018-05-04 NOTE — H&P ADULT - NSHPSOCIALHISTORY_GEN_ALL_CORE
former smoker as above, no alcohol, no drugs.  w/ children. previously worked in [employer]. lives in [place]. no recent travel. originally born in [place]

## 2018-05-04 NOTE — ED PROVIDER NOTE - OBJECTIVE STATEMENT
Alicia Ayala M.D: 57F hx ESRD on HD Tu/Th/Sa, HTN p/w sudden onset abd pain and N/V. felt fine last night woke up this AM with severe sharp upper abdominal pain that radiates around to the back, associated with multiple episodes of NBNB emesis. +chills. no fever no diarrhea no constipation no hx abd surgeries. no numbness/tingling/weakness in legs. no cp no sob. Alicia Ayala M.D: 57F hx ESRD on HD Tu/Th/Sa (formerly on PD), HTN p/w sudden onset abd pain and N/V. felt fine last night woke up this AM with severe sharp upper abdominal pain that radiates around to the back, associated with multiple episodes of NBNB emesis. +chills. no fever no diarrhea no constipation no hx abd surgeries. no numbness/tingling/weakness in legs. no cp no sob.

## 2018-05-04 NOTE — ED PROVIDER NOTE - ATTENDING CONTRIBUTION TO CARE
I was physically present for the E/M service provided. I agree with above history, physical, and plan which I have reviewed and edited where appropriate. I was physically present for the key portions of the service provided.    57F hx ESRD on HD Tu/Th/Sa (formerly on PD), HTN p/w sudden onset abd pain and N/V. Afebrile. Ill-appearing. Abdomen soft with mild diffuse abdominal pain with focus of moderate pain in RUQ. +Leukocytosis + bandemia on labs. CT a/p r/o infectious etiology as well as RUQ US r/o acute cholecystitis. UA r/o UTI. IV Abx for sepsis likely of abdominal origin. IV fluids limited due to dialysis status. Pt will require admission for sepsis.

## 2018-05-04 NOTE — H&P ADULT - PROBLEM SELECTOR PLAN 4
Patient reports hx of chronic pancreatic cyst 2 years prior  - per patient has not been reevaluated. CT imaging not identifying pathology however MRCP is superior imaging. Will order MRCP.

## 2018-05-04 NOTE — H&P ADULT - PROBLEM SELECTOR PLAN 9
Concern for early cirrhosis on CT  - previous w/u on last admit appreciated.  - will obtain INR to check synthetic function  - unclear source of changes

## 2018-05-04 NOTE — ED ADULT NURSE REASSESSMENT NOTE - NS ED NURSE REASSESS COMMENT FT1
report received from night RN Stephania ZEPEDA. Pt found in supine position, non-labored respirations. Pt waiting for imaging and dispo. VS documented, IV site intact and running without signs of infiltration; fistula in upper left arm with positive thrill, no signs of bleeding or tenderness. Daughter at bedside, will reassess.

## 2018-05-04 NOTE — H&P ADULT - NSHPPHYSICALEXAM_GEN_ALL_CORE
Vital Signs Last 24 Hrs  T(C): 36.9 (04 May 2018 10:34), Max: 36.9 (04 May 2018 07:12)  T(F): 98.5 (04 May 2018 10:34), Max: 98.5 (04 May 2018 07:12)  HR: 77 (04 May 2018 10:34) (70 - 87)  BP: 91/60 (04 May 2018 10:34) (91/60 - 200/86)  BP(mean): --  RR: 18 (04 May 2018 10:34) (18 - 24)  SpO2: 95% (04 May 2018 10:34) (94% - 99%)    GENERAL: NAD, well-developed  HEAD:  Atraumatic, Normocephalic  EYES: EOMI, PERRLA, conjunctiva and sclera clear  Mouth: MMM, no lesions  NECK: Supple, no appreciable masses, Mild JVD  Lung: normal work of breathing, cta b/l  Chest: S1&S2+, rrr, no m/r/g appreciated  ABDOMEN: bs+, soft, pain to palpation of b/l Lower quadrants R>L described as pressure sensation, no rebound, splenomegaly  : No topete catheter, on right flank to tapping there is some tenderness that is reported chronic  EXTREMITIES:  radial pulse present b/l, PT present b/l, no pitting edema present  Neuro: A&Ox3, no focal deficits  SKIN: warm and dry, no visible rashes Vital Signs Last 24 Hrs  T(C): 36.9 (04 May 2018 10:34), Max: 36.9 (04 May 2018 07:12)  T(F): 98.5 (04 May 2018 10:34), Max: 98.5 (04 May 2018 07:12)  HR: 77 (04 May 2018 10:34) (70 - 87)  BP: 91/60 (04 May 2018 10:34) (91/60 - 200/86)  BP(mean): --  RR: 18 (04 May 2018 10:34) (18 - 24)  SpO2: 95% (04 May 2018 10:34) (94% - 99%)    GENERAL: NAD, well-developed, non-diaphoretic, non-toxic appearing  HEAD:  Atraumatic, Normocephalic  EYES: EOMI, PERRLA, conjunctiva and sclera clear  Mouth: MMM, no lesions  NECK: Supple, no appreciable masses, Mild JVD  Lung: normal work of breathing, cta b/l  Chest: S1&S2+, rrr, no m/r/g appreciated  ABDOMEN: bs+, soft, pain to palpation of b/l Lower quadrants R>L described as pressure sensation, no rebound, splenomegaly  : No topete catheter, on right flank to tapping there is some tenderness that is reported chronic  EXTREMITIES:  radial pulse present b/l, PT present b/l, no pitting edema present  Neuro: A&Ox3, no focal deficits  SKIN: warm and dry, no visible rashes

## 2018-05-04 NOTE — H&P ADULT - PROBLEM SELECTOR PLAN 2
Hx of Polycythemia vera w/ splenomegaly  - per review of outpatient chart, patient has followed up with lili for evaluation. Patient reported to have initially been on hydroxyurea however self discontinued when working w/ hematologist in [place]. JAK2 neg on [facility] testing and given renal disease did not want to initiate hydroxyurea

## 2018-05-04 NOTE — ED PROVIDER NOTE - PHYSICAL EXAMINATION
Alicia Ayala M.D.:   patient awake alert seen lying on stretcher in fetal position in moderate distress 2/2 abd pain and nausea.   LUNGS CTAB no wheeze no crackle.   CARD RRR no m/r/g.    Abdomen soft ttp in epigastrium and RUQ w/ voluntary rebound guarding no CVA tenderness.   EXT WWP no edema no calf tenderness CV 2+DP/PT bilaterally. LUE fistula with palpable thrill.    neuro A&Ox3 gross motor and sensory intact in all extremities.    skin warm and dry no rash  HEENT: dry mucous membranes, PERRL, EOMI

## 2018-05-04 NOTE — ED PROVIDER NOTE - PROGRESS NOTE DETAILS
Alicia Ayala M.D: pt still in severe pain after first dose of morphine. will give second dose. will also give zosyn for presumed abd infection given wbc 17 w/ 9% bands

## 2018-05-04 NOTE — H&P ADULT - PROBLEM SELECTOR PLAN 5
Noted to have relative hypotension  hx of HTN and presented w/  likely in setting of pain. now BP reported in 90's on last VS. SIRS criteria met w/ RR and leukocytosis however in context of chronic leukocytosis and in setting of pain. At this time there does not appear to be source of clear infection. get UA. also patient received morphine IV which may have contributed. Will get repeat BP and provide 500cc bolus- possible fluid down from emesis. will hold of abx as no clear source and afebrile. Noted to have relative hypotension  hx of HTN and presented w/  likely in setting of pain. now BP reported in 90's on last VS. SIRS criteria met w/ RR and leukocytosis however in context of chronic leukocytosis and in setting of pain. At this time there does not appear to be source of clear infection. get UA. also patient received morphine IV which may have contributed. Will get repeat BP and provide 500cc bolus- possible fluid down from emesis. Will continue w/ zosyn for now until blood cx clear given bandemia

## 2018-05-04 NOTE — H&P ADULT - PROBLEM SELECTOR PLAN 6
patient noted to have lactic acidosis of unclear etiology. may be related to emesis.  - will repeat lactate to see if cleared following fluid and PO intake patient noted to have lactic acidosis of unclear etiology. may be related to emesis.  - will repeat lactate to see if cleared following fluid and PO intake  - likely driving metabolic ag acidosis however of note now acidemia on vbg

## 2018-05-04 NOTE — H&P ADULT - NSHPREVIEWOFSYSTEMS_GEN_ALL_CORE
CONSTITUTIONAL: No fever, no chills  EYES: No eye pain, no visual disturbance  Mouth: no pain in mouth, no cuts  RESPIRATORY: No cough, No sob  CARDIOVASCULAR: No CP, no palpitations  GASTROINTESTINAL: abdominal pain+, vomit+  GENITOURINARY: No dysuria, no hematuria  NEUROLOGICAL: No headaches, no weakness  SKIN: No itching, no rashes  MUSCULOSKELETAL: No joint pain, no joint swelling

## 2018-05-04 NOTE — H&P ADULT - PROBLEM SELECTOR PLAN 7
ESRD (T/Th/Sa) HD via LUE w/ av fistula. 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  - will consult nephrology for continued HD schedule

## 2018-05-04 NOTE — H&P ADULT - ATTENDING COMMENTS
Briefly, this is a 57F w/ hx of GN w/ ESRD on HD (T/Th/Sa), hx of pancreatic cyst, polycythemia vera c/b splenomegaly and splenic vein thrombosis w/ infarct, concern for early cirrhosis on CT w/ grade 1 gastric varices, PUD/GERD who presented to [facility] for sudden onset lower abdominal pain radiating to her back now resolved associated with NBNB emesis x5. Labwork significant for WBC 17.7, Hgb 16.8, Bands 9%, AG 21, Cr 5.21, Lactate 3.4. CT abdomen without bowel obstruction or evidence of acute inflammation, splenomegaly unchanged. Fluid was seen within segments of the colon, which may be seen in setting of diarrhea. Etiology for abdominal pain includes ischemia vs PUD vs infectious given fluid seen in colon on imaging. Abdominal examination benign, no pain on palpation, no guarding/rigidity. Will check abdominal doppler to assess blood flow. Will hold off on CTA as mesenteric ischemia lower on list of differential and pt just received dye load with recent CT today. Check UA/Ucx. Will c/w Zosyn to cover intraabdominal pathogens for now given increase in leukocytosis and bandemia. If cultures negative would likely discontinue. Appeared dry on examination. WOuld give IVF 500cc bolus and re-check lactate. Hold Lasix. Renal consult for dialysis.

## 2018-05-04 NOTE — H&P ADULT - PROBLEM SELECTOR PLAN 1
- unclear etiology of b/l lower quadrant abdominal pain w/ radiation to back  - Lipase elevated however not meeting criteria for acute pancreatitis and CT imaging not identifying pancreatitis. CT imaging w/o acute pathology however noting pathology  - ddx includes:  **Ischemia 2/2 PCV- CT a/p w/ IV co not identifying thrombosis however potentially transient or dependent on function of gut. Will need abdominal doppler to assess blood flow  **PUD/GERD- patient charted to have hx of PUD. EGD/colonoscopy Apr2016 significant only for sigmoid polyp, Portal hypertensive gastropathy, and gastritis. No report of melena or blood per os or rectum. for now monitor however less likely  **Worsening of previous pancreatic cyst. patient reports having it discovered 2 years prior without f/u. Would require MRCP for evaluation. given elevation in lipase and pain radiating to back would advocate to complete on admission to better characterize  **Infectious etiology- viral gastroenteritis->supportive care, no report of change in stool consistency and no blood. Will need to assess for UTI. obtain UA. SIRS criteria met on initial vs however patient was noted to have HTN and severe pain,vs normalized w/ morphine.  - for pain control will treat w/ prn acetaminophen 2g max daily and hydromorphone given ESRD - unclear etiology of b/l lower quadrant abdominal pain w/ radiation to back  - Lipase elevated however not meeting criteria for acute pancreatitis and CT imaging not identifying pancreatitis. CT imaging w/o acute pathology however noting pathology  - ddx includes:  **Ischemia 2/2 PCV- CT a/p w/ IV co not identifying thrombosis however potentially transient or dependent on function of gut. Will need abdominal doppler to assess blood flow  **PUD/GERD- patient charted to have hx of PUD. EGD/colonoscopy Apr2016 significant only for sigmoid polyp, Portal hypertensive gastropathy, and gastritis. No report of melena or blood per os or rectum. for now monitor however less likely  **Worsening of previous pancreatic cyst. patient reports having it discovered 2 years prior without f/u. Would require MRCP for evaluation. given elevation in lipase and pain radiating to back would advocate to complete on admission to better characterize  **Infectious etiology- viral gastroenteritis->supportive care, no report of change in stool consistency and no blood. Will need to assess for UTI. obtain UA. SIRS criteria met  w/ bandemia  Will cover w/ zosyn until blood cx clear  - for pain control will treat w/ prn acetaminophen 2g max daily and hold opioid givn recent hypotension

## 2018-05-04 NOTE — ED PROVIDER NOTE - MEDICAL DECISION MAKING DETAILS
57F ESRD p/w sudden onset severe upper abd pain w/ n/v.pt uncomfortable appearing with tender abdomen- concern for vikas vs pancreatitis vs SBO (though pt w/o hx of surgeries). for labs fluid(gentle) pain and nausea control. start with RUQ US and possible CTAP. reassess. 57F ESRD p/w sudden onset severe upper abd pain w/ n/v.pt uncomfortable appearing with tender abdomen- concern for vikas vs pancreatitis vs SBO (given hx of PD catheter). for labs fluid(gentle) pain and nausea control. start with RUQ US and possible CTAP. reassess.

## 2018-05-04 NOTE — H&P ADULT - NSHPLABSRESULTS_GEN_ALL_CORE
Personally reviewed available labs, imaging and ekg  Labs  CBC Full  -  ( 04 May 2018 05:21 )  WBC Count : 17.7 K/uL  Hemoglobin : 16.8 g/dL  Hematocrit : 53.4 %  Platelet Count - Automated : 309 K/uL  Mean Cell Volume : 80.0 fl  Mean Cell Hemoglobin : 25.1 pg  Mean Cell Hemoglobin Concentration : 31.4 gm/dL  Auto Neutrophil # : 16.0 K/uL  Auto Lymphocyte # : 1.6 K/uL  Auto Monocyte # : 0.7 K/uL  Auto Eosinophil # : 0.2 K/uL  Auto Basophil # : 0.1 K/uL  Auto Neutrophil % : 78.0 %  Auto Lymphocyte % : 6.0 %  Auto Monocyte % : 4.0 %  Auto Eosinophil % : 2.0 %  Auto Basophil % : 1.0 %    05-04    139  |  90<L>  |  35<H>  ----------------------------<  134<H>  4.7   |  28  |  5.21<H>    Ca    9.6      04 May 2018 05:21  TPro  8.8<H>  /  Alb  4.7  /  TBili  1.4<H>  /  DBili  x   /  AST  21  /  ALT  14  /  AlkPhos  189<H>  05-04  05:21 - VBG - pH: 7.46  | pCO2: 44    | pO2: 40    | Lactate: 3.4    Imaging  < from: CT Abdomen and Pelvis w/ IV Cont (05.04.18 @ 06:35) >  FINDINGS:  LOWER CHEST: Within normal limits.  LIVER: Caudate hypertrophy and medial segment atrophy, which may be seen   in early cirrhosis.  BILE DUCTS: Normal caliber.  GALLBLADDER: Within normal limits.  SPLEEN: Similar-appearing marked splenomegaly.  PANCREAS: Within normal limits.  ADRENALS: Within normal limits.  KIDNEYS/URETERS: Atrophic kidneys. Symmetric enhancement without   hydronephrosis.  BLADDER: Within normal limits.  REPRODUCTIVE ORGANS: The uterus and adnexa are within normal limits.  BOWEL: No bowel obstruction. Appendix is within normal limits. Fluid   within portions of the colon, which may be seen in setting of diarrhea.  PERITONEUM: No ascites.  VESSELS:  Extensive upper abdominal varices, seen on prior examinations.   RETROPERITONEUM: No lymphadenopathy.    ABDOMINAL WALL: Small fat-containing umbilical hernia.  BONES: Mild thoracolumbar spondylosis.  IMPRESSION:  No bowel obstruction or evidence of acute inflammation. Fluid within   segments of the colon, which may be seen in setting of diarrhea.  Similar-appearing marked splenomegaly with questionable early cirrhosis.   Prominent upper abdominal varices, as on prior examinations.  < end of copied text >  CXR: clear lung fields      EKG Personally reviewed available labs, imaging and ekg  Labs  CBC Full  -  ( 04 May 2018 05:21 )  WBC Count : 17.7 K/uL  Hemoglobin : 16.8 g/dL  Hematocrit : 53.4 %  Platelet Count - Automated : 309 K/uL  Mean Cell Volume : 80.0 fl  Mean Cell Hemoglobin : 25.1 pg  Mean Cell Hemoglobin Concentration : 31.4 gm/dL  Auto Neutrophil # : 16.0 K/uL  Auto Lymphocyte # : 1.6 K/uL  Auto Monocyte # : 0.7 K/uL  Auto Eosinophil # : 0.2 K/uL  Auto Basophil # : 0.1 K/uL  Auto Neutrophil % : 78.0 %  Auto Lymphocyte % : 6.0 %  Auto Monocyte % : 4.0 %  Auto Eosinophil % : 2.0 %  Auto Basophil % : 1.0 %    05-04    139  |  90<L>  |  35<H>  ----------------------------<  134<H>  4.7   |  28  |  5.21<H>    Ca    9.6      04 May 2018 05:21  TPro  8.8<H>  /  Alb  4.7  /  TBili  1.4<H>  /  DBili  x   /  AST  21  /  ALT  14  /  AlkPhos  189<H>  05-04  05:21 - VBG - pH: 7.46  | pCO2: 44    | pO2: 40    | Lactate: 3.4    Lipase, Serum: 92 U/L (05.04.18 @ 05:21)  Lipase, Serum: 78 U/L (03.22.18 @ 17:20)    Imaging  < from: CT Abdomen and Pelvis w/ IV Cont (05.04.18 @ 06:35) >  FINDINGS:  LOWER CHEST: Within normal limits.  LIVER: Caudate hypertrophy and medial segment atrophy, which may be seen   in early cirrhosis.  BILE DUCTS: Normal caliber.  GALLBLADDER: Within normal limits.  SPLEEN: Similar-appearing marked splenomegaly.  PANCREAS: Within normal limits.  ADRENALS: Within normal limits.  KIDNEYS/URETERS: Atrophic kidneys. Symmetric enhancement without   hydronephrosis.  BLADDER: Within normal limits.  REPRODUCTIVE ORGANS: The uterus and adnexa are within normal limits.  BOWEL: No bowel obstruction. Appendix is within normal limits. Fluid   within portions of the colon, which may be seen in setting of diarrhea.  PERITONEUM: No ascites.  VESSELS:  Extensive upper abdominal varices, seen on prior examinations.   RETROPERITONEUM: No lymphadenopathy.    ABDOMINAL WALL: Small fat-containing umbilical hernia.  BONES: Mild thoracolumbar spondylosis.  IMPRESSION:  No bowel obstruction or evidence of acute inflammation. Fluid within   segments of the colon, which may be seen in setting of diarrhea.  Similar-appearing marked splenomegaly with questionable early cirrhosis.   Prominent upper abdominal varices, as on prior examinations.  < end of copied text >  CXR: clear lung fields      EKG: [id number] TWI in V2 V3 improved from previous EKG w/o SLAVA QTc 476

## 2018-05-04 NOTE — H&P ADULT - PROBLEM SELECTOR PLAN 3
charted hx of PUD and GERD  - as above, EGD/colonoscopy 2016 w/o PUD in region evaluated. given no hx of melena, or bleeding would consider low on ddx as cause of abdominal pain  - d/w famotidine home dosing

## 2018-05-04 NOTE — ED ADULT NURSE NOTE - OBJECTIVE STATEMENT
56 yo female pt with history of ESRD on dialysis tuesday/thursday/saturday, last dialysis was one day ago, pt completed treatment with no complications, HTN presents to ed complaining of sudden onset of lower abd pain with nausea and vomiting. pt states pain was so severe it woke her from her sleep. pt describes pain as severe sharp upper abd pain that radiates around the back. pt also complaining of chills. pt denies fevers/diaphoresis/constipation/diarrhea/dysuria/burning during urination/blood in stool or urine/chest pain/sob/numbness/tingling/sob/cough. breath sounds clear and equal bilaterally. abd tender and nondistended. skin warm dry and intact.

## 2018-05-04 NOTE — ED ADULT NURSE NOTE - CHPI ED SYMPTOMS NEG
no diarrhea/no burning urination/no dysuria/no blood in stool/no hematuria/no abdominal distension/no fever

## 2018-05-04 NOTE — H&P ADULT - ASSESSMENT
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

## 2018-05-04 NOTE — PATIENT PROFILE ADULT. - AS SC BRADEN ACTIVITY
"Anesthesia Post Evaluation    Patient: Jhonny Diana    Procedure(s) Performed: Procedure(s) (LRB):  ULTRASOUND-ENDOSCOPIC-UPPER/liver Bx (N/A)  ERCP (N/A)    Final Anesthesia Type: general  Patient location during evaluation: PACU  Patient participation: Yes- Able to Participate  Level of consciousness: awake and alert  Post-procedure vital signs: reviewed and stable  Pain management: adequate  Airway patency: patent  PONV status at discharge: No PONV  Anesthetic complications: no      Cardiovascular status: hemodynamically stable  Respiratory status: unassisted  Hydration status: euvolemic  Follow-up not needed.        Visit Vitals  BP (!) 152/101   Pulse 85   Temp 36.8 °C (98.2 °F) (Oral)   Resp 14   Ht 5' 6" (1.676 m)   Wt 63 kg (139 lb)   SpO2 100%   BMI 22.44 kg/m²       Pain/Diogenes Score: Pain Assessment Performed: Yes (4/30/2018 11:15 AM)  Presence of Pain: complains of pain/discomfort (4/30/2018 11:15 AM)  Pain Rating Prior to Med Admin: 8 (4/30/2018 11:36 AM)  Diogenes Score: 9 (4/30/2018 11:15 AM)      " (3) walks occasionally

## 2018-05-04 NOTE — H&P ADULT - HISTORY OF PRESENT ILLNESS
57F [place] 13 [place]) w/ hx of GN w/ ESRD on HD (T/Th/Sa), hx of pancreatic cyst, polycythemia vera c/b splenomegaly and splenic vein thrombosis w/ infarct, concern for early cirrhosis on CT w/ grade 1 gastric varicescollateral vessels, Former smoker (10year,1/3ppd, quit 2015) & GERD presents to [facility] for suddent lower abdominal pain radiating to her back. Patient reports that approximately 3:30am on day of admission she was awoken by sudden "sharp-tearing" b/l abdominal pain w/ radiation to her back which was 10/10 severity non-positional and associated w/ extreme NBNB emesis x2 and w/o diarrhea. The patient reports that pain has some relief after emesis but only improved after receiving morphine in the ED. She completed HD the day before and did not have change in diet prior to event. There has been no report of fever, chills, body shaking, chest pain, sob, cough, rash, sick contacts or recent travel. The patient is noted to have recent admit for chest pain which had acs r/o but discovery of CT imaging of liver concerning for early cirrhosis w/ HepB/C, cash, hemochromatosis, autoimmune hepatitis, fatty liver and portal vein thrombosis were ruled out Mar2018. 57F [place] ([place] 13 [place]) w/ hx of GN w/ ESRD on HD (T/Th/Sa), hx of pancreatic cyst, polycythemia vera c/b splenomegaly and splenic vein thrombosis w/ infarct, concern for early cirrhosis on CT w/ grade 1 gastric varicescollateral vessels, Former smoker (10year,1/3ppd, quit 2015) & PUD/GERD presents to [facility] for suddent lower abdominal pain radiating to her back. Patient reports that approximately 3:30am on day of admission she was awoken by sudden "sharp-tearing" b/l abdominal pain w/ radiation to her back which was 10/10 severity non-positional and associated w/ extreme NBNB emesis x2 and w/o diarrhea. The patient reports that pain has some relief after emesis but only improved after receiving morphine in the ED. She completed HD the day before and did not have change in diet prior to event. There has been no report of fever, chills, body shaking, chest pain, sob, cough, rash, sick contacts or recent travel. The patient is noted to have recent admit for chest pain which had acs r/o but discovery of CT imaging of liver concerning for early cirrhosis w/ HepB/C, cash, hemochromatosis, autoimmune hepatitis, fatty liver and portal vein thrombosis were ruled out Mar2018.

## 2018-05-05 LAB
ANION GAP SERPL CALC-SCNC: 18 MMOL/L — HIGH (ref 5–17)
BUN SERPL-MCNC: 46 MG/DL — HIGH (ref 7–23)
CALCIUM SERPL-MCNC: 8.2 MG/DL — LOW (ref 8.4–10.5)
CHLORIDE SERPL-SCNC: 97 MMOL/L — SIGNIFICANT CHANGE UP (ref 96–108)
CO2 SERPL-SCNC: 21 MMOL/L — LOW (ref 22–31)
CREAT SERPL-MCNC: 5.84 MG/DL — HIGH (ref 0.5–1.3)
GLUCOSE SERPL-MCNC: 58 MG/DL — LOW (ref 70–99)
HBV SURFACE AG SER-ACNC: SIGNIFICANT CHANGE UP
HCT VFR BLD CALC: 44.2 % — SIGNIFICANT CHANGE UP (ref 34.5–45)
HCV AB S/CO SERPL IA: 0.21 S/CO — SIGNIFICANT CHANGE UP
HCV AB SERPL-IMP: SIGNIFICANT CHANGE UP
HGB BLD-MCNC: 13.4 G/DL — SIGNIFICANT CHANGE UP (ref 11.5–15.5)
MAGNESIUM SERPL-MCNC: 2.3 MG/DL — SIGNIFICANT CHANGE UP (ref 1.6–2.6)
MCHC RBC-ENTMCNC: 24.8 PG — LOW (ref 27–34)
MCHC RBC-ENTMCNC: 30.4 GM/DL — LOW (ref 32–36)
MCV RBC AUTO: 81.5 FL — SIGNIFICANT CHANGE UP (ref 80–100)
PHOSPHATE SERPL-MCNC: 5.4 MG/DL — HIGH (ref 2.5–4.5)
PLATELET # BLD AUTO: 188 K/UL — SIGNIFICANT CHANGE UP (ref 150–400)
POTASSIUM SERPL-MCNC: 5.9 MMOL/L — HIGH (ref 3.5–5.3)
POTASSIUM SERPL-SCNC: 5.9 MMOL/L — HIGH (ref 3.5–5.3)
RBC # BLD: 5.42 M/UL — HIGH (ref 3.8–5.2)
RBC # FLD: 22.2 % — HIGH (ref 10.3–14.5)
SODIUM SERPL-SCNC: 136 MMOL/L — SIGNIFICANT CHANGE UP (ref 135–145)
WBC # BLD: 8.4 K/UL — SIGNIFICANT CHANGE UP (ref 3.8–10.5)
WBC # FLD AUTO: 8.4 K/UL — SIGNIFICANT CHANGE UP (ref 3.8–10.5)

## 2018-05-05 PROCEDURE — 99222 1ST HOSP IP/OBS MODERATE 55: CPT | Mod: GC

## 2018-05-05 PROCEDURE — 93010 ELECTROCARDIOGRAM REPORT: CPT

## 2018-05-05 PROCEDURE — 99233 SBSQ HOSP IP/OBS HIGH 50: CPT | Mod: GC

## 2018-05-05 RX ORDER — DOXERCALCIFEROL 2.5 UG/1
1 CAPSULE ORAL
Qty: 0 | Refills: 0 | Status: DISCONTINUED | OUTPATIENT
Start: 2018-05-05 | End: 2018-05-07

## 2018-05-05 RX ADMIN — FAMOTIDINE 20 MILLIGRAM(S): 10 INJECTION INTRAVENOUS at 17:46

## 2018-05-05 RX ADMIN — HEPARIN SODIUM 5000 UNIT(S): 5000 INJECTION INTRAVENOUS; SUBCUTANEOUS at 05:33

## 2018-05-05 RX ADMIN — PIPERACILLIN AND TAZOBACTAM 25 GRAM(S): 4; .5 INJECTION, POWDER, LYOPHILIZED, FOR SOLUTION INTRAVENOUS at 02:59

## 2018-05-05 RX ADMIN — DOXERCALCIFEROL 1 MICROGRAM(S): 2.5 CAPSULE ORAL at 14:00

## 2018-05-05 RX ADMIN — HEPARIN SODIUM 5000 UNIT(S): 5000 INJECTION INTRAVENOUS; SUBCUTANEOUS at 21:56

## 2018-05-05 NOTE — PROGRESS NOTE ADULT - PROBLEM SELECTOR PLAN 1
unclear etiology of b/l lower quadrant abdominal pain w/ radiation to back. Lipase elevated however CT imaging not identifying pancreatitis. ddx includes ischemia 2/2 PCV vs viral GI illness vs infectious process (less likely)  -will need abdominal doppler to assess blood flow  -Will need to assess for UTI. f/u U Cx. w  -will discontinue zosyn as blood cultures are clear  - for pain control will treat w/ prn acetaminophen 2g max daily and hold opioid given recent hypotension

## 2018-05-05 NOTE — PROGRESS NOTE ADULT - PROBLEM SELECTOR PLAN 4
Patient reports hx of chronic pancreatic cyst 2 years prior  - per radiology no indication to do MRCP Patient reports hx of chronic pancreatic cyst 2 years prior  - per radiology no indication to do MRCP at this time as CT a/p is better test for pancreatic cyst as well as portal/hepatic/splenic vein thromboses

## 2018-05-05 NOTE — PROGRESS NOTE ADULT - PROBLEM SELECTOR PLAN 4
continue calcium carbonate.   monitor phos levels.   Continue Hecterol 1 mcg IV tiw. continue calcium carbonate.   monitor phos levels.   Continue Hectorol 1 mcg IV tiw.

## 2018-05-05 NOTE — PROGRESS NOTE ADULT - PROBLEM SELECTOR PLAN 1
Gets HD (TTS) at [facility] [place] via LUE AVF. Pt previously on PD and was switch to HD in setting of hemoperitoneum in 09/2017.  Will do HD today for hyperkalemia

## 2018-05-05 NOTE — PROVIDER CONTACT NOTE (OTHER) - ACTION/TREATMENT ORDERED:
MD assessed patient and explained that possible s/s may be r/t to recent  dialysis.  Pt instructed to alert of worsening s/s .Will continue to monitor

## 2018-05-05 NOTE — PROGRESS NOTE ADULT - SUBJECTIVE AND OBJECTIVE BOX
CONTACT INFO  Alejandro Hernandez MD PGY 1  Pager: NS- [phone], JOSEJ- [phone]    Mon-Fri: pager covered by day team 7am-7pm;   ***Academic conferences M-F 8am-9am & 12pm-1pm- page ONLY if URGENT or if Consultant  /Elsa: see chart, primary physician assigned available 7am-12pm  Sat/[place] Coverage 12pm-7pm: NS- page [phone] for Team1-4, [facility]- pager forwarded to covering Resident  For Night coverage 7pm-7am: NS- page [phone] Team1-3, page [phone] Team4 & Care Model    >>> <<<    cc: f/u for abdominal pain    SUBJECTIVE / OVERNIGHT EVENTS: abdominal pain improved. no nausea or vomiting. no cp or sob.    MEDICATIONS  (STANDING):  doxercalciferol Injectable 1 MICROGram(s) IV Push <User Schedule>  famotidine    Tablet 20 milliGRAM(s) Oral every 24 hours  heparin  Injectable 5000 Unit(s) SubCutaneous every 8 hours  piperacillin/tazobactam IVPB. 3.375 Gram(s) IV Intermittent every 12 hours    MEDICATIONS  (PRN):      T(F): 97.9 (05-05-18 @ 10:18), Max: 98.6 (05-05-18 @ 01:00)  HR: 65 (05-05-18 @ 10:18) (65 - 72)  BP: 124/74 (05-05-18 @ 10:18) (93/61 - 125/76)  RR: 18 (05-05-18 @ 10:18) (18 - 18)  SpO2: 96% (05-05-18 @ 10:18) (93% - 96%)    05-04-18 @ 07:01  -  05-05-18 @ 07:00  --------------------------------------------------------  IN: 100 mL / OUT: 450 mL / NET: -350 mL      CAPILLARY BLOOD GLUCOSE        PHYSICAL EXAM:    GENERAL: NAD, well-developed, non-diaphoretic, non-toxic appearing  	HEAD:  Atraumatic, Normocephalic  	Lung: normal work of breathing, cta b/l  	Chest: S1&S2+, rrr, no m/r/g appreciated  	ABDOMEN: bs+, soft, mild ttp at epigastric region  	EXTREMITIES:  no pitting edema present  	Neuro: A&Ox3, no focal deficits  SKIN: warm and dry, no visible rashes      LABS:                        13.4   8.4   )-----------( 188      ( 05 May 2018 07:11 )             44.2     05-05    136  |  97  |  46<H>  ----------------------------<  58<L>  5.9<H>   |  21<L>  |  5.84<H>    Ca    8.2<L>      05 May 2018 07:11  Phos  5.4     05-05  Mg     2.3     05-05    TPro  8.8<H>  /  Alb  4.7  /  TBili  1.4<H>  /  DBili  x   /  AST  21  /  ALT  14  /  AlkPhos  189<H>  05-04        RADIOLOGY & ADDITIONAL TESTS:    Imaging Personally Reviewed:  Consultant(s) Notes Reviewed:  renal  Care Discussed with Consultants/Other Providers:

## 2018-05-05 NOTE — PROGRESS NOTE ADULT - PROBLEM SELECTOR PLAN 6
ESRD (T/Th/Sa) HD via LUE w/ av fistula. 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  - will consult nephrology for continued HD schedule ESRD (T/Th/Sa) HD via LUE w/ av fistula. 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  -  continued HD schedule (T/Th/Sat)

## 2018-05-05 NOTE — PROGRESS NOTE ADULT - SUBJECTIVE AND OBJECTIVE BOX
[facility] DIVISION OF KIDNEY DISEASES AND HYPERTENSION -- HEMODIALYSIS NOTE  --------------------------------------------------------------------------------  Bari Rock     --------------------------------------------------------------------------------  Chief Complaint: ESRD/Ongoing hemodialysis requirement    HPI  57F w/ hx of GN w/ ESRD on HD (T/Th/Sa), hx of pancreatic cyst, polycythemia vera c/b splenomegaly and splenic vein thrombosis w/ infarct, concern for early cirrhosis on CT w/ grade 1 gastric varicescollateral vessels, Former smoker (10year,1/3ppd, quit 2015) & PUD/GERD presents to [facility] for sudden lower abdominal pain radiating to her back not meeting criteria for acute pancreatitis however requiring evaluation for abdominal pain 2/2 ischemia from PCV, PUD, worsening of previous pancreatic cyst, and infectious etiology.      PAST HISTORY  --------------------------------------------------------------------------------  No significant changes to PMH, PSH, FHx, SHx, unless otherwise noted    ALLERGIES & MEDICATIONS  --------------------------------------------------------------------------------  Allergies    No Known Allergies    Intolerances      Standing Inpatient Medications  famotidine    Tablet 20 milliGRAM(s) Oral every 24 hours  heparin  Injectable 5000 Unit(s) SubCutaneous every 8 hours  piperacillin/tazobactam IVPB. 3.375 Gram(s) IV Intermittent every 12 hours    PRN Inpatient Medications      REVIEW OF SYSTEMS  --------------------------------------------------------------------------------  Constitutional: [ ] Fever [ ] Chills [ ] Fatigue [ ] Weight change   HEENT: [ ] Blurred vision [ ] Eye Pain [ ] Headache [ ] Runny nose [ ] Sore Throat   Respiratory: [ ] Cough [ ] Wheezing [ ] Shortness of breath  Cardiovascular: [ ] Chest Pain [ ] Palpitations [ ] SANTAMARIA [ ] PND [ ] Orthopnea  Gastrointestinal: [ ] Abdominal Pain [ ] Diarrhea [ ] Constipation [ ] Hemorrhoids [ ] Nausea [ ] Vomiting  Genitourinary: [ ] Nocturia [ ] Dysuria [ ] Incontinence  Extremities: [ ] Swelling [ ] Joint Pain  Neurologic: [ ] Focal deficit [ ] Paresthesias [ ] Syncope  Lymphatic: [ ] Swelling [ ] Lymphadenopathy   Skin: [ ] Rash [ ] Ecchymoses [ ] Wounds [ ] Lesions  Psychiatry: [ ] Depression [ ] Suicidal/Homicidal Ideation [ ] Anxiety [ ] Sleep Disturbances  [ ] 10 point review of systems is otherwise negative except as mentioned above              [ ]Unable to obtain  All other systems were reviewed and are negative, except as noted.    VITALS/PHYSICAL EXAM  --------------------------------------------------------------------------------  T(C): 36.7 (05-05-18 @ 04:56), Max: 37 (05-05-18 @ 01:00)  HR: 67 (05-05-18 @ 04:56) (65 - 77)  BP: 125/76 (05-05-18 @ 04:56) (91/60 - 125/76)  RR: 18 (05-05-18 @ 04:56) (18 - 18)  SpO2: 93% (05-05-18 @ 04:56) (93% - 96%)  Wt(kg): --    Height (cm): 157.48 (05-04-18 @ 18:09)  Weight (kg): 54.8 (05-04-18 @ 18:09)  BMI (kg/m2): 22.1 (05-04-18 @ 18:09)  BSA (m2): 1.54 (05-04-18 @ 18:09)  Daily Height in cm: 157.48 (04 May 2018 18:09)    Daily   I&O's Summary    04 May 2018 07:01  -  05 May 2018 07:00  --------------------------------------------------------  IN: 100 mL / OUT: 450 mL / NET: -350 mL          05-04-18 @ 07:01  -  05-05-18 @ 07:00  --------------------------------------------------------  IN: 100 mL / OUT: 450 mL / NET: -350 mL        Physical Exam:  	    Gen: NAD   	HEENT: anicteric  	Pulm: CTA B/L   	CV: RRR  	Back: No dependent edema  	Abd: soft, nontender, nondistended  	: No efrem  	LE: Warm, no edema  	Neuro: no asterixis  	Skin: Warm, without rashes  	Vascular access: none    LABS/STUDIES  --------------------------------------------------------------------------------              13.4   8.4   >-----------<  188      [05-05-18 @ 07:11]              44.2     136  |  97  |  46  ----------------------------<  58      [05-05-18 @ 07:11]  5.9   |  21  |  5.84        Ca     8.2     [05-05-18 @ 07:11]      Mg     2.3     [05-05-18 @ 07:11]      Phos  5.4     [05-05-18 @ 07:11]    TPro  8.8  /  Alb  4.7  /  TBili  1.4  /  DBili  x   /  AST  21  /  ALT  14  /  AlkPhos  189  [05-04-18 @ 05:21]          Iron 26, TIBC 68, %sat 38      [03-14-18 @ 10:48]  Ferritin 122      [03-14-18 @ 09:37]  HbA1c 4.3      [03-11-18 @ 09:35]  TSH 1.68      [03-11-18 @ 09:33]  Lipid: chol 107, TG 64, HDL 39, LDL 55      [03-11-18 @ 09:47]      Iron Total, Serum: 26 ug/dL (03-14 @ 10:48)  Iron - Total Binding Capacity.: 68 ug/dL (03-14 @ 10:48)  Ferritin, Serum: 122 ng/mL (03-14 @ 09:37)      Radiology  --------------------------------------------------------------------------------    --------------------------------------------------------------------------------  Bari Rock    [facility] DIVISION OF KIDNEY DISEASES AND HYPERTENSION -- HEMODIALYSIS NOTE  --------------------------------------------------------------------------------  Bari Rock     --------------------------------------------------------------------------------  Chief Complaint: ESRD/Ongoing hemodialysis requirement    HPI  57F w/ hx of GN w/ ESRD on HD (T/Th/Sa), hx of pancreatic cyst, polycythemia vera c/b splenomegaly and splenic vein thrombosis w/ infarct, concern for early cirrhosis on CT w/ grade 1 gastric varicescollateral vessels, Former smoker (10year,1/3ppd, quit 2015) & PUD/GERD presents to [facility] for sudden lower abdominal pain radiating to her back not meeting criteria for acute pancreatitis however requiring evaluation for abdominal pain 2/2 ischemia from PCV, PUD, worsening of previous pancreatic cyst, and infectious etiology.      PAST HISTORY  --------------------------------------------------------------------------------  No significant changes to PMH, PSH, FHx, SHx, unless otherwise noted    ALLERGIES & MEDICATIONS  --------------------------------------------------------------------------------  Allergies    No Known Allergies    Intolerances      Standing Inpatient Medications  famotidine    Tablet 20 milliGRAM(s) Oral every 24 hours  heparin  Injectable 5000 Unit(s) SubCutaneous every 8 hours  piperacillin/tazobactam IVPB. 3.375 Gram(s) IV Intermittent every 12 hours    PRN Inpatient Medications      REVIEW OF SYSTEMS  --------------------------------------------------------------------------------  Constitutional: [ ] Fever [ ] Chills [ ] Fatigue [ ] Weight change   HEENT: [ ] Blurred vision [ ] Eye Pain [ ] Headache [ ] Runny nose [ ] Sore Throat   Respiratory: [ ] Cough [ ] Wheezing [ ] Shortness of breath  Cardiovascular: [ ] Chest Pain [ ] Palpitations [ ] SANTAMARIA [ ] PND [ ] Orthopnea  Gastrointestinal: [ ] Abdominal Pain [ ] Diarrhea [ ] Constipation [ ] Hemorrhoids [ ] Nausea [ ] Vomiting  Genitourinary: [ ] Nocturia [ ] Dysuria [ ] Incontinence  Extremities: [ ] Swelling [ ] Joint Pain  Neurologic: [ ] Focal deficit [ ] Paresthesias [ ] Syncope  Lymphatic: [ ] Swelling [ ] Lymphadenopathy   Skin: [ ] Rash [ ] Ecchymoses [ ] Wounds [ ] Lesions  Psychiatry: [ ] Depression [ ] Suicidal/Homicidal Ideation [ ] Anxiety [ ] Sleep Disturbances  [ x] 10 point review of systems is otherwise negative except as mentioned above              [ ]Unable to obtain  All other systems were reviewed and are negative, except as noted.    VITALS/PHYSICAL EXAM  --------------------------------------------------------------------------------  T(C): 36.7 (05-05-18 @ 04:56), Max: 37 (05-05-18 @ 01:00)  HR: 67 (05-05-18 @ 04:56) (65 - 77)  BP: 125/76 (05-05-18 @ 04:56) (91/60 - 125/76)  RR: 18 (05-05-18 @ 04:56) (18 - 18)  SpO2: 93% (05-05-18 @ 04:56) (93% - 96%)  Wt(kg): --    Height (cm): 157.48 (05-04-18 @ 18:09)  Weight (kg): 54.8 (05-04-18 @ 18:09)  BMI (kg/m2): 22.1 (05-04-18 @ 18:09)  BSA (m2): 1.54 (05-04-18 @ 18:09)  Daily Height in cm: 157.48 (04 May 2018 18:09)    Daily   I&O's Summary    04 May 2018 07:01  -  05 May 2018 07:00  --------------------------------------------------------  IN: 100 mL / OUT: 450 mL / NET: -350 mL          05-04-18 @ 07:01  -  05-05-18 @ 07:00  --------------------------------------------------------  IN: 100 mL / OUT: 450 mL / NET: -350 mL        Physical Exam:  	    Gen: NAD   	HEENT: anicteric  	Pulm: CTA B/L   	CV: RRR  	Back: No dependent edema  	Abd: soft, nontender, nondistended  	: No efrem  	LE: Warm, no edema  	Neuro: no asterixis  	Skin: Warm, without rashes  	Vascular access: LAVF with thrill and bruit    LABS/STUDIES  --------------------------------------------------------------------------------              13.4   8.4   >-----------<  188      [05-05-18 @ 07:11]              44.2     136  |  97  |  46  ----------------------------<  58      [05-05-18 @ 07:11]  5.9   |  21  |  5.84        Ca     8.2     [05-05-18 @ 07:11]      Mg     2.3     [05-05-18 @ 07:11]      Phos  5.4     [05-05-18 @ 07:11]    TPro  8.8  /  Alb  4.7  /  TBili  1.4  /  DBili  x   /  AST  21  /  ALT  14  /  AlkPhos  189  [05-04-18 @ 05:21]          Iron 26, TIBC 68, %sat 38      [03-14-18 @ 10:48]  Ferritin 122      [03-14-18 @ 09:37]  HbA1c 4.3      [03-11-18 @ 09:35]  TSH 1.68      [03-11-18 @ 09:33]  Lipid: chol 107, TG 64, HDL 39, LDL 55      [03-11-18 @ 09:47]      Iron Total, Serum: 26 ug/dL (03-14 @ 10:48)  Iron - Total Binding Capacity.: 68 ug/dL (03-14 @ 10:48)  Ferritin, Serum: 122 ng/mL (03-14 @ 09:37)      Radiology  --------------------------------------------------------------------------------    --------------------------------------------------------------------------------  Bari Rock

## 2018-05-05 NOTE — CHART NOTE - NSCHARTNOTEFT_GEN_A_CORE
Called by RN with patient complaining of pain around fistula site s/p dialysis. Patient reports that pain had happened last Saturday during session and resolved. Good palpable pulses distally, with good palpable thrill. No evidence of erythema or point tenderness around AVF site. No evidence of UE extermity swelling to suggest extravasation of blood.    Discussed with RN. Will continue to monitor, likely discomfort post dialysis. Will monitor pulses and UE site around AVF for extravasation.

## 2018-05-06 ENCOUNTER — TRANSCRIPTION ENCOUNTER (OUTPATIENT)
Age: 58
End: 2018-05-06

## 2018-05-06 LAB
ANION GAP SERPL CALC-SCNC: 17 MMOL/L — SIGNIFICANT CHANGE UP (ref 5–17)
BUN SERPL-MCNC: 20 MG/DL — SIGNIFICANT CHANGE UP (ref 7–23)
CALCIUM SERPL-MCNC: 8.9 MG/DL — SIGNIFICANT CHANGE UP (ref 8.4–10.5)
CHLORIDE SERPL-SCNC: 95 MMOL/L — LOW (ref 96–108)
CO2 SERPL-SCNC: 25 MMOL/L — SIGNIFICANT CHANGE UP (ref 22–31)
CREAT SERPL-MCNC: 4.88 MG/DL — HIGH (ref 0.5–1.3)
CULTURE RESULTS: NO GROWTH — SIGNIFICANT CHANGE UP
GLUCOSE SERPL-MCNC: 52 MG/DL — LOW (ref 70–99)
HCT VFR BLD CALC: 46.2 % — HIGH (ref 34.5–45)
HGB BLD-MCNC: 14.4 G/DL — SIGNIFICANT CHANGE UP (ref 11.5–15.5)
MAGNESIUM SERPL-MCNC: 2.3 MG/DL — SIGNIFICANT CHANGE UP (ref 1.6–2.6)
MCHC RBC-ENTMCNC: 25.3 PG — LOW (ref 27–34)
MCHC RBC-ENTMCNC: 31.1 GM/DL — LOW (ref 32–36)
MCV RBC AUTO: 81.2 FL — SIGNIFICANT CHANGE UP (ref 80–100)
PHOSPHATE SERPL-MCNC: 5.5 MG/DL — HIGH (ref 2.5–4.5)
PLATELET # BLD AUTO: 200 K/UL — SIGNIFICANT CHANGE UP (ref 150–400)
POTASSIUM SERPL-MCNC: 4.4 MMOL/L — SIGNIFICANT CHANGE UP (ref 3.5–5.3)
POTASSIUM SERPL-SCNC: 4.4 MMOL/L — SIGNIFICANT CHANGE UP (ref 3.5–5.3)
RBC # BLD: 5.69 M/UL — HIGH (ref 3.8–5.2)
RBC # FLD: 21.7 % — HIGH (ref 10.3–14.5)
SODIUM SERPL-SCNC: 137 MMOL/L — SIGNIFICANT CHANGE UP (ref 135–145)
SPECIMEN SOURCE: SIGNIFICANT CHANGE UP
WBC # BLD: 8.5 K/UL — SIGNIFICANT CHANGE UP (ref 3.8–10.5)
WBC # FLD AUTO: 8.5 K/UL — SIGNIFICANT CHANGE UP (ref 3.8–10.5)

## 2018-05-06 PROCEDURE — 99233 SBSQ HOSP IP/OBS HIGH 50: CPT | Mod: GC

## 2018-05-06 RX ORDER — ONDANSETRON 8 MG/1
4 TABLET, FILM COATED ORAL EVERY 6 HOURS
Qty: 0 | Refills: 0 | Status: DISCONTINUED | OUTPATIENT
Start: 2018-05-06 | End: 2018-05-07

## 2018-05-06 RX ORDER — CALCIUM ACETATE 667 MG
667 TABLET ORAL
Qty: 0 | Refills: 0 | Status: DISCONTINUED | OUTPATIENT
Start: 2018-05-06 | End: 2018-05-07

## 2018-05-06 RX ORDER — DOCUSATE SODIUM 100 MG
100 CAPSULE ORAL
Qty: 0 | Refills: 0 | Status: DISCONTINUED | OUTPATIENT
Start: 2018-05-06 | End: 2018-05-07

## 2018-05-06 RX ORDER — SENNA PLUS 8.6 MG/1
2 TABLET ORAL AT BEDTIME
Qty: 0 | Refills: 0 | Status: DISCONTINUED | OUTPATIENT
Start: 2018-05-06 | End: 2018-05-07

## 2018-05-06 RX ORDER — POLYETHYLENE GLYCOL 3350 17 G/17G
17 POWDER, FOR SOLUTION ORAL DAILY
Qty: 0 | Refills: 0 | Status: DISCONTINUED | OUTPATIENT
Start: 2018-05-06 | End: 2018-05-07

## 2018-05-06 RX ADMIN — SENNA PLUS 2 TABLET(S): 8.6 TABLET ORAL at 21:40

## 2018-05-06 RX ADMIN — Medication 100 MILLIGRAM(S): at 17:58

## 2018-05-06 RX ADMIN — FAMOTIDINE 20 MILLIGRAM(S): 10 INJECTION INTRAVENOUS at 17:58

## 2018-05-06 RX ADMIN — HEPARIN SODIUM 5000 UNIT(S): 5000 INJECTION INTRAVENOUS; SUBCUTANEOUS at 21:40

## 2018-05-06 RX ADMIN — HEPARIN SODIUM 5000 UNIT(S): 5000 INJECTION INTRAVENOUS; SUBCUTANEOUS at 14:00

## 2018-05-06 RX ADMIN — HEPARIN SODIUM 5000 UNIT(S): 5000 INJECTION INTRAVENOUS; SUBCUTANEOUS at 06:00

## 2018-05-06 RX ADMIN — ONDANSETRON 4 MILLIGRAM(S): 8 TABLET, FILM COATED ORAL at 17:10

## 2018-05-06 RX ADMIN — Medication 667 MILLIGRAM(S): at 17:58

## 2018-05-06 NOTE — PROGRESS NOTE ADULT - PROBLEM SELECTOR PLAN 6
ESRD (T/Th/Sa) HD via LUE w/ av fistula. 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  -  continued HD schedule (T/Th/Sat)

## 2018-05-06 NOTE — PROGRESS NOTE ADULT - SUBJECTIVE AND OBJECTIVE BOX
Mon-Fri: pager covered by day team 7am-7pm;   ***Academic conferences M-F 8am-9am & 12pm-1pm- page ONLY if URGENT or if Consultant  /Elsa: see chart, primary physician assigned available 7am-12pm  Sat/[place] Coverage 12pm-7pm: NS- page [phone] for Team1-4, [facility]- pager forwarded to covering Resident  For Night coverage 7pm-7am: NS- page [phone] Team1-3, page [phone] Team4 & Care Model    >>> <<<    cc: f/u for abdominal pain    SUBJECTIVE / OVERNIGHT EVENTS: abdominal pain improved. no nausea or vomiting. no cp or sob.    MEDICATIONS  (STANDING):  doxercalciferol Injectable 1 MICROGram(s) IV Push <User Schedule>  famotidine    Tablet 20 milliGRAM(s) Oral every 24 hours  heparin  Injectable 5000 Unit(s) SubCutaneous every 8 hours  piperacillin/tazobactam IVPB. 3.375 Gram(s) IV Intermittent every 12 hours    MEDICATIONS  (PRN):      Vital Signs Last 24 Hrs  T(C): 36.9 (06 May 2018 08:49), Max: 36.9 (05 May 2018 17:13)  T(F): 98.5 (06 May 2018 08:49), Max: 98.5 (06 May 2018 08:49)  HR: 67 (06 May 2018 08:49) (66 - 79)  BP: 130/78 (06 May 2018 08:49) (114/75 - 148/88)  BP(mean): --  RR: 16 (06 May 2018 08:49) (16 - 18)  SpO2: 97% (06 May 2018 08:49) (94% - 97%)      05-05 @ 07:01  -  05-06 @ 07:00  --------------------------------------------------------  IN: 630 mL / OUT: 1000 mL / NET: -370 mL    05-06 @ 07:01  -  05-06 @ 12:15  --------------------------------------------------------  IN: 360 mL / OUT: 0 mL / NET: 360 mL        CAPILLARY BLOOD GLUCOSE        PHYSICAL EXAM:    GENERAL: NAD, well-developed, non-diaphoretic, non-toxic appearing  	HEAD:  Atraumatic, Normocephalic  	Lung: normal work of breathing, cta b/l  	Chest: S1&S2+, rrr, no m/r/g appreciated  	ABDOMEN: bs+, soft, mild ttp at epigastric region  	EXTREMITIES:  no pitting edema present  	Neuro: A&Ox3, no focal deficits  	SKIN: warm and dry, no visible rashes      LABS:                          14.4   8.5   )-----------( 200      ( 06 May 2018 07:01 )             46.2     05-06    137  |  95<L>  |  20  ----------------------------<  52<L>  4.4   |  25  |  4.88<H>    Ca    8.9      06 May 2018 07:02  Phos  5.5     05-06  Mg     2.3     05-06      CAPILLARY BLOOD GLUCOSE                RADIOLOGY & ADDITIONAL TESTS:    Imaging Personally Reviewed:  Consultant(s) Notes Reviewed:  renal  Care Discussed with Consultants/Other Providers:

## 2018-05-06 NOTE — DISCHARGE NOTE ADULT - NSCORESITESY/N_GEN_A_CORE_RD
on Duonebs ATC  cont nebs  BIPAP as needed for resp distress  keep sat > 90 pct  oral care  cont tele monitoring  will restart Inhalers after initial cardiac and pulmonary assessment Likely related to pulmonary edema, acute CHF. Continue treatment with BiPAP, will titrate FiO2 to keep SaO2 > 90%. Will wean as tolerated. DuoNebs ordered for possible COPD component. No

## 2018-05-06 NOTE — DISCHARGE NOTE ADULT - MEDICATION SUMMARY - MEDICATIONS TO TAKE
I will START or STAY ON the medications listed below when I get home from the hospital:    furosemide 20 mg oral tablet  -- 3 tab(s) by mouth once a day  -- Indication: For Cirrhosis    famotidine 20 mg oral tablet  -- 1 tab(s) by mouth 2 times a day  -- Indication: For gerd I will START or STAY ON the medications listed below when I get home from the hospital:    furosemide 20 mg oral tablet  -- 3 tab(s) by mouth once a day  -- Indication: For Cirrhosis    famotidine 20 mg oral tablet  -- 1 tab(s) by mouth 2 times a day  -- Indication: For gerd    calcium acetate 667 mg oral capsule  -- 1 tab(s) by mouth 3 times a day   -- Indication: For ESRD (end stage renal disease)

## 2018-05-06 NOTE — PROGRESS NOTE ADULT - PROBLEM SELECTOR PLAN 4
Patient reports hx of chronic pancreatic cyst 2 years prior  - per radiology no indication to do MRCP at this time as CT a/p is better test for pancreatic cyst as well as portal/hepatic/splenic vein thromboses

## 2018-05-06 NOTE — DISCHARGE NOTE ADULT - PLAN OF CARE
Improvement of symptoms We were concerned your abdominal pain was due to a blood clot in your intestinal blood vessels. We did a ultrasound of your abdomen and found ---. Your abdominal pain improved. Please follow up with your primary care doctor. We were concerned your abdominal pain was due to a blood clot in your intestinal blood vessels. We did an ultrasound of your abdomen and found that you did not have inflammation of your gallbladder, and you did not have a new clot in your abdominal blood vessels. You do have a chronic clot in some of yoru abdominal blood vessels. Your abdominal pain improved. Please follow up with your primary care doctor. management You have had a pancreatic cyst for 2 years prior, and radiology recommended no MRCP at this time. Continue PD as per renal, follow up with your nephrologist. Continue to follow up with your hematologist and take your medications as prescribed treatment You had no dark stools/melena/blood in stool while you were admitted. Continue to take your medications and follow up with your gastroenterologist.

## 2018-05-06 NOTE — DISCHARGE NOTE ADULT - HOSPITAL COURSE
57F [place] ([place] 13 [place] w/ hx of GN w/ ESRD on HD (T/Th/Sa), hx of pancreatic cyst, polycythemia vera c/b splenomegaly and splenic vein thrombosis w/ infarct, concern for early cirrhosis on CT w/ grade 1 gastric varicescollateral vessels, Former smoker (10year,1/3ppd, quit 2015) & PUD/GERD presents to [facility] for sudden lower abdominal pain radiating to her back. Lipase elevated. CT a/p done showed no pancreatitis, but did show pancreatic cyst as well as fluid in the colon. CBC showing leukocytosis and bandemia, so patient started on zosyn. Leukocytosis resolved. Abdominal doppler done showing ---. Patient stabilized and discharged home with PCP followup. 57F [place] ([place] 13 [place]) w/ hx of GN w/ ESRD on HD (T/Th/Sa), hx of pancreatic cyst, polycythemia vera c/b splenomegaly and splenic vein thrombosis w/ infarct, concern for early cirrhosis on CT w/ grade 1 gastric varicescollateral vessels, Former smoker (10year,1/3ppd, quit 2015) & PUD/GERD presents to [facility] for sudden lower abdominal pain radiating to her back. Lipase elevated. CT a/p done showed no pancreatitis, but did show pancreatic cyst as well as fluid in the colon. CBC showing leukocytosis and bandemia, so patient started on zosyn. Leukocytosis resolved. Abdominal doppler done showing attenuated main splenic vein with chronic thrombosis and patent hepatic veins, but no new clots were noted. Patient stabilized and discharged home with PCP followup.

## 2018-05-06 NOTE — DISCHARGE NOTE ADULT - CARE PLAN
Principal Discharge DX:	Abdominal pain  Goal:	Improvement of symptoms  Assessment and plan of treatment:	We were concerned your abdominal pain was due to a blood clot in your intestinal blood vessels. We did a ultrasound of your abdomen and found ---. Your abdominal pain improved. Please follow up with your primary care doctor. Principal Discharge DX:	Abdominal pain  Goal:	Improvement of symptoms  Assessment and plan of treatment:	We were concerned your abdominal pain was due to a blood clot in your intestinal blood vessels. We did an ultrasound of your abdomen and found that you did not have inflammation of your gallbladder, and you did not have a new clot in your abdominal blood vessels. You do have a chronic clot in some of yoru abdominal blood vessels. Your abdominal pain improved. Please follow up with your primary care doctor. Principal Discharge DX:	Abdominal pain  Goal:	Improvement of symptoms  Assessment and plan of treatment:	We were concerned your abdominal pain was due to a blood clot in your intestinal blood vessels. We did an ultrasound of your abdomen and found that you did not have inflammation of your gallbladder, and you did not have a new clot in your abdominal blood vessels. You do have a chronic clot in some of yoru abdominal blood vessels. Your abdominal pain improved. Please follow up with your primary care doctor.  Secondary Diagnosis:	Pancreatic cyst  Goal:	management  Assessment and plan of treatment:	You have had a pancreatic cyst for 2 years prior, and radiology recommended no MRCP at this time.  Secondary Diagnosis:	ESRD on peritoneal dialysis  Goal:	management  Assessment and plan of treatment:	Continue PD as per renal, follow up with your nephrologist.  Secondary Diagnosis:	Polycythemia vera  Goal:	management  Assessment and plan of treatment:	Continue to follow up with your hematologist and take your medications as prescribed  Secondary Diagnosis:	PUD (peptic ulcer disease)  Goal:	treatment  Assessment and plan of treatment:	You had no dark stools/melena/blood in stool while you were admitted. Continue to take your medications and follow up with your gastroenterologist.

## 2018-05-06 NOTE — DISCHARGE NOTE ADULT - OTHER SIGNIFICANT FINDINGS
< from: CT Abdomen and Pelvis w/ IV Cont (05.04.18 @ 06:35) >  IMPRESSION:    No bowel obstruction or evidence of acute inflammation. Fluid within   segments of the colon, which may be seen in setting of diarrhea.    Similar-appearing marked splenomegaly with questionable early cirrhosis.     Prominent upper abdominal varices, as on prior examinations.    < end of copied text >

## 2018-05-06 NOTE — PROGRESS NOTE ADULT - PROBLEM SELECTOR PLAN 1
unclear etiology of b/l lower quadrant abdominal pain w/ radiation to back. Lipase elevated however CT imaging not identifying pancreatitis. ddx includes ischemia 2/2 PCV vs viral GI illness vs infectious process (less likely)  -will need abdominal doppler to assess blood flow  -Will need to assess for UTI. f/u U Cx. w  -will discontinue zosyn as blood cultures are clear  - for pain control will treat w/ prn acetaminophen 2g max daily and hold opioid given recent hypotension unclear etiology of b/l lower quadrant abdominal pain w/ radiation to back. Lipase elevated however CT imaging not identifying pancreatitis. ddx includes ischemia 2/2 PCV vs viral GI illness vs infectious process (less likely)  -f/u US doppler to r/o thrombus   -currently off antibiotics   - for pain control will treat w/ prn acetaminophen 2g max daily and hold opioid given recent hypotension

## 2018-05-06 NOTE — DISCHARGE NOTE ADULT - PATIENT PORTAL LINK FT
You can access the [facility] Patient Portal, offered by [facility], by registering with the following website: [email or web address]

## 2018-05-06 NOTE — DISCHARGE NOTE ADULT - CARE PROVIDER_API CALL
Azucena De León), Internal Medicine; Nephrology  [address] [place] [address] [address] [place] [address]  Phone: [phone]  Fax: [phone]

## 2018-05-07 VITALS
TEMPERATURE: 99 F | SYSTOLIC BLOOD PRESSURE: 153 MMHG | RESPIRATION RATE: 18 BRPM | OXYGEN SATURATION: 98 % | HEART RATE: 73 BPM | DIASTOLIC BLOOD PRESSURE: 92 MMHG

## 2018-05-07 LAB
ANION GAP SERPL CALC-SCNC: 21 MMOL/L — HIGH (ref 5–17)
BUN SERPL-MCNC: 26 MG/DL — HIGH (ref 7–23)
CALCIUM SERPL-MCNC: 8.5 MG/DL — SIGNIFICANT CHANGE UP (ref 8.4–10.5)
CHLORIDE SERPL-SCNC: 98 MMOL/L — SIGNIFICANT CHANGE UP (ref 96–108)
CO2 SERPL-SCNC: 20 MMOL/L — LOW (ref 22–31)
CREAT SERPL-MCNC: 6.08 MG/DL — HIGH (ref 0.5–1.3)
GLUCOSE SERPL-MCNC: 61 MG/DL — LOW (ref 70–99)
HCT VFR BLD CALC: 47.9 % — HIGH (ref 34.5–45)
HGB BLD-MCNC: 14.6 G/DL — SIGNIFICANT CHANGE UP (ref 11.5–15.5)
MAGNESIUM SERPL-MCNC: 2.7 MG/DL — HIGH (ref 1.6–2.6)
MCHC RBC-ENTMCNC: 25.3 PG — LOW (ref 27–34)
MCHC RBC-ENTMCNC: 30.6 GM/DL — LOW (ref 32–36)
MCV RBC AUTO: 82.8 FL — SIGNIFICANT CHANGE UP (ref 80–100)
PHOSPHATE SERPL-MCNC: 6.1 MG/DL — HIGH (ref 2.5–4.5)
PLATELET # BLD AUTO: 198 K/UL — SIGNIFICANT CHANGE UP (ref 150–400)
POTASSIUM SERPL-MCNC: 4.1 MMOL/L — SIGNIFICANT CHANGE UP (ref 3.5–5.3)
POTASSIUM SERPL-SCNC: 4.1 MMOL/L — SIGNIFICANT CHANGE UP (ref 3.5–5.3)
RBC # BLD: 5.78 M/UL — HIGH (ref 3.8–5.2)
RBC # FLD: 21.7 % — HIGH (ref 10.3–14.5)
SODIUM SERPL-SCNC: 139 MMOL/L — SIGNIFICANT CHANGE UP (ref 135–145)
WBC # BLD: 9.3 K/UL — SIGNIFICANT CHANGE UP (ref 3.8–10.5)
WBC # FLD AUTO: 9.3 K/UL — SIGNIFICANT CHANGE UP (ref 3.8–10.5)

## 2018-05-07 PROCEDURE — 93975 VASCULAR STUDY: CPT | Mod: 26

## 2018-05-07 PROCEDURE — 99239 HOSP IP/OBS DSCHRG MGMT >30: CPT

## 2018-05-07 RX ORDER — CALCIUM ACETATE 667 MG
2 TABLET ORAL
Qty: 0 | Refills: 0 | DISCHARGE
Start: 2018-05-07 | End: 2018-06-05

## 2018-05-07 RX ORDER — CALCIUM ACETATE 667 MG
1 TABLET ORAL
Qty: 90 | Refills: 0 | OUTPATIENT
Start: 2018-05-07 | End: 2018-06-05

## 2018-05-07 RX ADMIN — Medication 100 MILLIGRAM(S): at 05:11

## 2018-05-07 RX ADMIN — HEPARIN SODIUM 5000 UNIT(S): 5000 INJECTION INTRAVENOUS; SUBCUTANEOUS at 05:11

## 2018-05-07 RX ADMIN — Medication 667 MILLIGRAM(S): at 12:19

## 2018-05-07 NOTE — PROGRESS NOTE ADULT - PROBLEM SELECTOR PROBLEM 3
PUD (peptic ulcer disease)
Anemia due to chronic kidney disease
PUD (peptic ulcer disease)
PUD (peptic ulcer disease)

## 2018-05-07 NOTE — PROGRESS NOTE ADULT - PROBLEM SELECTOR PLAN 2
Hx of Polycythemia vera w/ splenomegaly  - per review of outpatient chart, patient has followed up with lili for evaluation. Patient reported to have initially been on hydroxyurea however self discontinued when working w/ hematologist in [place]. JAK2 neg on [facility] testing and given renal disease did not want to initiate hydroxyurea
BP ok can hold anti hypertensives
Hx of Polycythemia vera w/ splenomegaly  - per review of outpatient chart, patient has followed up with lili for evaluation. Patient reported to have initially been on hydroxyurea however self discontinued when working w/ hematologist in [place]. JAK2 neg on Lili testing and given renal disease did not want to initiate hydroxyurea
Hx of Polycythemia vera w/ splenomegaly  - per review of outpatient chart, patient has followed up with lili for evaluation. Patient reported to have initially been on hydroxyurea however self discontinued when working w/ hematologist in [place]. JAK2 neg on Lili testing and given renal disease did not want to initiate hydroxyurea

## 2018-05-07 NOTE — PROGRESS NOTE ADULT - ASSESSMENT
57F [place] in [place] (in [place] 13 [place]) w/ hx of GN w/ ESRD on HD (T/Th/Sa), hx of pancreatic cyst, polycythemia vera c/b splenomegaly and splenic vein thrombosis w/ infarct, concern for early cirrhosis on CT w/ grade 1 gastric varicescollateral vessels, Former smoker (10year,1/3ppd, quit 2015) & PUD/GERD presents to [facility] for sudden lower abdominal pain radiating to her back not meeting criteria for acute pancreatitis however requiring evaluation for abdominal pain 2/2 ischemia from PCV, PUD, worsening of previous pancreatic cyst, and infectious etiology.
57F [place] 12 [place] w/ hx of GN w/ ESRD on HD (T/Th/Sa), hx of pancreatic cyst, polycythemia vera c/b splenomegaly and splenic vein thrombosis w/ infarct, concern for early cirrhosis on CT w/ grade 1 gastric varicescollateral vessels, Former smoker (10year,1/3ppd, quit 2015) & PUD/GERD presents to [facility] for sudden lower abdominal pain radiating to her back not meeting criteria for acute pancreatitis however requiring evaluation for abdominal pain 2/2 ischemia from PCV, PUD, worsening of previous pancreatic cyst, and infectious etiology.
57F w/ hx of GN w/ ESRD on HD (T/Th/Sa), hx of pancreatic cyst, polycythemia vera c/b splenomegaly and splenic vein thrombosis w/ infarct, concern for early cirrhosis on CT w/ grade 1 gastric varicescollateral vessels, Former smoker (10year,1/3ppd, quit 2015) & PUD/GERD presents to [facility] for sudden lower abdominal pain radiating to her back not meeting criteria for acute pancreatitis however requiring evaluation for abdominal pain 2/2 ischemia from PCV, PUD, worsening of previous pancreatic cyst, and infectious etiology.
57F [place]) w/ hx of GN w/ ESRD on HD (T/Th/Sa), hx of pancreatic cyst, polycythemia vera c/b splenomegaly and splenic vein thrombosis w/ infarct, concern for early cirrhosis on CT w/ grade 1 gastric varicescollateral vessels, Former smoker (10year,1/3ppd, quit 2015) & PUD/GERD presents to [facility] for sudden lower abdominal pain radiating to her back not meeting criteria for acute pancreatitis however requiring evaluation for abdominal pain 2/2 ischemia from PCV, PUD, worsening of previous pancreatic cyst, and infectious etiology.

## 2018-05-07 NOTE — PROGRESS NOTE ADULT - ATTENDING COMMENTS
Patient states that her abdominal pain is resolved.  Tolerated breakfast and lunch today.  Doppler shows old thrombus with surrounding patent varicosities.  Will discharge home today.  F/U with renal Patient states that her abdominal pain is resolved.  Tolerated breakfast and lunch today.  Doppler shows old thrombus with surrounding patent varicosities.  Will discharge home today.  F/U with renal as an outpatient.       55 minutes spent coordinating discharge.

## 2018-05-07 NOTE — PROGRESS NOTE ADULT - PROBLEM SELECTOR PLAN 7
Noted splenomegaly as above attributed to PCV

## 2018-05-07 NOTE — PROGRESS NOTE ADULT - PROBLEM SELECTOR PLAN 8
Concern for early cirrhosis on CT  - previous w/u on last admit appreciated.  - will obtain INR to check synthetic function  - unclear source of changes
Concern for early cirrhosis on CT  - previous w/u on last admit appreciated.  - will obtain INR to check synthetic function  - unclear source of changes
Concern for early cirrhosis on CT  -f/u outpt

## 2018-05-07 NOTE — PROGRESS NOTE ADULT - PROBLEM SELECTOR PLAN 1
unclear etiology of b/l lower quadrant abdominal pain w/ radiation to back. Lipase elevated however CT imaging not identifying pancreatitis. ddx includes ischemia 2/2 PCV vs viral GI illness vs infectious process  -f/u US doppler to r/o thrombus   -currently off antibiotics   - for pain control will treat w/ prn acetaminophen 2g max daily and hold opioid given recent hypotension

## 2018-05-07 NOTE — PROGRESS NOTE ADULT - PROBLEM SELECTOR PLAN 5
patient noted to have lactic acidosis of unclear etiology. may be related to emesis.  - will repeat lactate to see if cleared following fluid and PO intake  - likely driving metabolic ag acidosis
patient noted to have lactic acidosis of unclear etiology. may be related to emesis.  - will repeat lactate to see if cleared following fluid and PO intake  - likely driving metabolic ag acidosis
resolving. bicarb 20 today.

## 2018-05-07 NOTE — PROGRESS NOTE ADULT - PROBLEM SELECTOR PLAN 3
charted hx of PUD and GERD  - as above, EGD/colonoscopy 2016 w/o PUD in region evaluated. given no hx of melena, or bleeding would consider low on ddx as cause of abdominal pain  - d/w famotidine home dosing
was on Epo 5000 units IV tiw.   Hgb at goal will hold for now

## 2018-05-07 NOTE — PROGRESS NOTE ADULT - SUBJECTIVE AND OBJECTIVE BOX
CONTACT INFO  Alejandro Hernandez MD PGY 1  Pager: NS- [phone], JOSEJ- [phone]    Mon-Fri: pager covered by day team 7am-7pm;   ***Academic conferences M-F 8am-9am & 12pm-1pm- page ONLY if URGENT or if Consultant  /Elsa: see chart, primary physician assigned available 7am-12pm  Sat/[place] Coverage 12pm-7pm: NS- page [phone] for Team1-4, [facility]- pager forwarded to covering Resident  For Night coverage 7pm-7am: NS- page [phone] Team1-3, page [phone] Team4 & Care Model    >>> <<<    cc: f/u for abdominal pain    SUBJECTIVE / OVERNIGHT EVENTS: abd pain improved. no n/v.    MEDICATIONS  (STANDING):  calcium acetate 667 milliGRAM(s) Oral three times a day with meals  docusate sodium 100 milliGRAM(s) Oral two times a day  doxercalciferol Injectable 1 MICROGram(s) IV Push <User Schedule>  famotidine    Tablet 20 milliGRAM(s) Oral every 24 hours  heparin  Injectable 5000 Unit(s) SubCutaneous every 8 hours  senna 2 Tablet(s) Oral at bedtime    MEDICATIONS  (PRN):  ondansetron Injectable 4 milliGRAM(s) IV Push every 6 hours PRN Nausea and/or Vomiting  polyethylene glycol 3350 17 Gram(s) Oral daily PRN Constipation      T(F): 97.7 (05-07-18 @ 05:33), Max: 98.8 (05-06-18 @ 21:53)  HR: 64 (05-07-18 @ 05:33) (64 - 74)  BP: 144/79 (05-07-18 @ 05:33) (130/78 - 161/99)  RR: 16 (05-07-18 @ 05:33) (16 - 16)  SpO2: 94% (05-07-18 @ 05:33) (94% - 97%)    05-06-18 @ 07:01  -  05-07-18 @ 07:00  --------------------------------------------------------  IN: 780 mL / OUT: 0 mL / NET: 780 mL      CAPILLARY BLOOD GLUCOSE        PHYSICAL EXAM:    GENERAL: NAD, well-developed, non-diaphoretic, non-toxic appearing  HEAD:  Atraumatic, Normocephalic  Lung: normal work of breathing, cta b/l  Chest: S1&S2+, rrr, no m/r/g appreciated  ABDOMEN: bs+, soft, nt nd  EXTREMITIES:  no pitting edema present  Neuro: A&Ox3, no focal deficits  SKIN: warm and dry, no visible rashes      LABS:                        14.4   8.5   )-----------( 200      ( 06 May 2018 07:01 )             46.2     05-07    139  |  98  |  26<H>  ----------------------------<  61<L>  4.1   |  20<L>  |  6.08<H>    Ca    8.5      07 May 2018 07:07  Phos  6.1     05-07  Mg     2.7     05-07                    RADIOLOGY & ADDITIONAL TESTS:    Imaging Personally Reviewed:  Consultant(s) Notes Reviewed:    Care Discussed with Consultants/Other Providers:

## 2018-05-08 ENCOUNTER — APPOINTMENT (OUTPATIENT)
Dept: ENDOVASCULAR SURGERY | Facility: CLINIC | Age: 58
End: 2018-05-08
Payer: MEDICAID

## 2018-05-08 PROCEDURE — 36902Z: CUSTOM

## 2018-05-08 PROCEDURE — 36907Z: CUSTOM | Mod: 59

## 2018-05-16 ENCOUNTER — OUTPATIENT (OUTPATIENT)
Dept: OUTPATIENT SERVICES | Facility: HOSPITAL | Age: 58
LOS: 1 days | End: 2018-05-16
Payer: SELF-PAY

## 2018-05-16 ENCOUNTER — LABORATORY RESULT (OUTPATIENT)
Age: 58
End: 2018-05-16

## 2018-05-16 ENCOUNTER — APPOINTMENT (OUTPATIENT)
Dept: INTERNAL MEDICINE | Facility: CLINIC | Age: 58
End: 2018-05-16
Payer: MEDICAID

## 2018-05-16 VITALS
BODY MASS INDEX: 22.66 KG/M2 | HEART RATE: 72 BPM | DIASTOLIC BLOOD PRESSURE: 79 MMHG | SYSTOLIC BLOOD PRESSURE: 131 MMHG | OXYGEN SATURATION: 98 % | WEIGHT: 116 LBS

## 2018-05-16 DIAGNOSIS — I10 ESSENTIAL (PRIMARY) HYPERTENSION: ICD-10-CM

## 2018-05-16 DIAGNOSIS — T85.898A OTHER SPECIFIED COMPLICATION OF OTHER INTERNAL PROSTHETIC DEVICES, IMPLANTS AND GRAFTS, INITIAL ENCOUNTER: Chronic | ICD-10-CM

## 2018-05-16 DIAGNOSIS — I77.0 ARTERIOVENOUS FISTULA, ACQUIRED: Chronic | ICD-10-CM

## 2018-05-16 DIAGNOSIS — Z99.2 DEPENDENCE ON RENAL DIALYSIS: Chronic | ICD-10-CM

## 2018-05-16 PROCEDURE — 86038 ANTINUCLEAR ANTIBODIES: CPT

## 2018-05-16 PROCEDURE — 86381 MITOCHONDRIAL ANTIBODY EACH: CPT

## 2018-05-16 PROCEDURE — G0463: CPT

## 2018-05-16 PROCEDURE — 99213 OFFICE O/P EST LOW 20 MIN: CPT | Mod: GE

## 2018-05-16 RX ORDER — SEVELAMER CARBONATE 800 MG/1
800 TABLET, FILM COATED ORAL 3 TIMES DAILY
Qty: 270 | Refills: 0 | Status: COMPLETED | COMMUNITY
Start: 2018-04-05 | End: 2018-05-16

## 2018-05-16 RX ORDER — SUCROFERRIC OXYHYDROXIDE 500 MG/1
500 TABLET, CHEWABLE ORAL 3 TIMES DAILY
Qty: 90 | Refills: 3 | Status: COMPLETED | COMMUNITY
Start: 2018-05-01 | End: 2018-05-16

## 2018-05-16 RX ORDER — GABAPENTIN 100 MG/1
100 CAPSULE ORAL
Qty: 30 | Refills: 1 | Status: COMPLETED | COMMUNITY
Start: 2018-02-23 | End: 2018-05-16

## 2018-05-17 LAB
ALBUMIN SERPL ELPH-MCNC: 4.7 G/DL
ALP BLD-CCNC: 180 U/L
ALT SERPL-CCNC: 13 U/L
ANA PAT FLD IF-IMP: ABNORMAL
ANA TITR SER: ABNORMAL
ANION GAP SERPL CALC-SCNC: 16 MMOL/L
AST SERPL-CCNC: 16 U/L
BASOPHILS # BLD AUTO: 0.17 K/UL
BASOPHILS NFR BLD AUTO: 0.9 %
BILIRUB SERPL-MCNC: 1.4 MG/DL
BUN SERPL-MCNC: 44 MG/DL
CALCIUM SERPL-MCNC: 8.9 MG/DL
CHLORIDE SERPL-SCNC: 102 MMOL/L
CO2 SERPL-SCNC: 19 MMOL/L
CREAT SERPL-MCNC: 6.76 MG/DL
EOSINOPHIL # BLD AUTO: 0.51 K/UL
EOSINOPHIL NFR BLD AUTO: 2.7 %
GLUCOSE SERPL-MCNC: 88 MG/DL
HCT VFR BLD CALC: 52.3 %
HGB BLD-MCNC: 16 G/DL
LYMPHOCYTES # BLD AUTO: 1.69 K/UL
LYMPHOCYTES NFR BLD AUTO: 9 %
MAN DIFF?: NORMAL
MCHC RBC-ENTMCNC: 25.2 PG
MCHC RBC-ENTMCNC: 30.6 GM/DL
MCV RBC AUTO: 82.5 FL
MITOCHONDRIA AB SER-ACNC: SIGNIFICANT CHANGE UP
MONOCYTES # BLD AUTO: 0.51 K/UL
MONOCYTES NFR BLD AUTO: 2.7 %
NEUTROPHILS # BLD AUTO: 15.92 K/UL
NEUTROPHILS NFR BLD AUTO: 84.7 %
PLATELET # BLD AUTO: 238 K/UL
POTASSIUM SERPL-SCNC: 5.7 MMOL/L
PROT SERPL-MCNC: 8.7 G/DL
RBC # BLD: 6.34 M/UL
RBC # FLD: 22.8 %
SODIUM SERPL-SCNC: 137 MMOL/L
WBC # FLD AUTO: 18.8 K/UL

## 2018-05-17 NOTE — ASSESSMENT
[FreeTextEntry1] : 57F with ESRD 2/2 chronic GN (HD via LUE fistula TThSat), HTN, PCV c/b splenomegaly/splenic infarcts, grade 1gastric varices, presenting for post-hospitalization follow up\par \par \par \par Cirrhosis-Seen on CT scan from impatient\par -Will check ASMA,RAHUL,Anti mitochondrial ab\par -Hepatology referral given\par -Hep B serology at next visit. \par -Fibro scan \par \par GERD-likely from meals late at night\par -c/w PePCid \par -Encouraged eatingn earlier to avoid reflux symptoms\par \par \par Hypertension-Will monitor. Completely wnl on my reading today\par -Encouraged f/u with nephrologist. \par \par \par d/w Dr. Verma \par RTC 5 weeks

## 2018-05-17 NOTE — HISTORY OF PRESENT ILLNESS
[FreeTextEntry2] : 57F with ESRD 2/2 chronic GN (HD via LUE fistula TThSat), HTN, PCV c/b splenomegaly/splenic infarcts, grade 1 gastric varices, presenting for post-hospitalization follow up\par \par PT went to ER for epigastric pain radiating to the back. No longer has pain. occasional nausea post HD but none at visit. \par \par Post HD hypertension-Pt reports BP has been elevated post HD to the 150s-160s which is unsuual for her \par \par Acid Reflux-PT reports that on occacion she experiences reflux type symptoms despite taking pepecid. on further investigation pt revealed that she has been eating late and going to bed. She reports that it is difficult to eat normally because she wakes up in the middle of the night hungry\par \par \par \par HCM\par Hep Vaccine received 1 week prior to this visit.

## 2018-05-17 NOTE — REVIEW OF SYSTEMS
[Nausea] : nausea [Fever] : no fever [Chills] : no chills [Night Sweats] : no night sweats [Hot Flashes] : no hot flashes [Pain] : no pain [Earache] : no earache [Nasal Discharge] : no nasal discharge [Palpitations] : no palpitations [Paroysmal Nocturnal Dyspnea] : no paroysmal nocturnal dyspnea [Wheezing] : no wheezing [Cough] : no cough [Constipation] : no constipation [Diarrhea] : diarrhea [Vaginal Discharge] : no vaginal discharge [Joint Pain] : no joint pain [Joint Stiffness] : no joint stiffness [Joint Swelling] : no joint swelling

## 2018-05-17 NOTE — END OF VISIT
[] : Resident [FreeTextEntry3] : abd pain improved. fu gi and hepatology. cirrhosis workup. recent hep b injection. fu 5w.

## 2018-05-17 NOTE — PHYSICAL EXAM

## 2018-05-21 DIAGNOSIS — N18.6 END STAGE RENAL DISEASE: ICD-10-CM

## 2018-05-21 DIAGNOSIS — K74.60 UNSPECIFIED CIRRHOSIS OF LIVER: ICD-10-CM

## 2018-05-23 PROCEDURE — 82435 ASSAY OF BLOOD CHLORIDE: CPT

## 2018-05-23 PROCEDURE — 93005 ELECTROCARDIOGRAM TRACING: CPT

## 2018-05-23 PROCEDURE — 83605 ASSAY OF LACTIC ACID: CPT

## 2018-05-23 PROCEDURE — 99285 EMERGENCY DEPT VISIT HI MDM: CPT | Mod: 25

## 2018-05-23 PROCEDURE — 82803 BLOOD GASES ANY COMBINATION: CPT

## 2018-05-23 PROCEDURE — 87340 HEPATITIS B SURFACE AG IA: CPT

## 2018-05-23 PROCEDURE — 76705 ECHO EXAM OF ABDOMEN: CPT

## 2018-05-23 PROCEDURE — 84100 ASSAY OF PHOSPHORUS: CPT

## 2018-05-23 PROCEDURE — 83690 ASSAY OF LIPASE: CPT

## 2018-05-23 PROCEDURE — 80048 BASIC METABOLIC PNL TOTAL CA: CPT

## 2018-05-23 PROCEDURE — 96374 THER/PROPH/DIAG INJ IV PUSH: CPT | Mod: XU

## 2018-05-23 PROCEDURE — 84295 ASSAY OF SERUM SODIUM: CPT

## 2018-05-23 PROCEDURE — 83735 ASSAY OF MAGNESIUM: CPT

## 2018-05-23 PROCEDURE — 80053 COMPREHEN METABOLIC PANEL: CPT

## 2018-05-23 PROCEDURE — 85027 COMPLETE CBC AUTOMATED: CPT

## 2018-05-23 PROCEDURE — 71045 X-RAY EXAM CHEST 1 VIEW: CPT

## 2018-05-23 PROCEDURE — 82330 ASSAY OF CALCIUM: CPT

## 2018-05-23 PROCEDURE — 93975 VASCULAR STUDY: CPT

## 2018-05-23 PROCEDURE — 87040 BLOOD CULTURE FOR BACTERIA: CPT

## 2018-05-23 PROCEDURE — 86803 HEPATITIS C AB TEST: CPT

## 2018-05-23 PROCEDURE — 87086 URINE CULTURE/COLONY COUNT: CPT

## 2018-05-23 PROCEDURE — 96375 TX/PRO/DX INJ NEW DRUG ADDON: CPT

## 2018-05-23 PROCEDURE — 96376 TX/PRO/DX INJ SAME DRUG ADON: CPT

## 2018-05-23 PROCEDURE — 84132 ASSAY OF SERUM POTASSIUM: CPT

## 2018-05-23 PROCEDURE — 99261: CPT

## 2018-05-23 PROCEDURE — 82947 ASSAY GLUCOSE BLOOD QUANT: CPT

## 2018-05-23 PROCEDURE — 74177 CT ABD & PELVIS W/CONTRAST: CPT

## 2018-05-23 PROCEDURE — 85014 HEMATOCRIT: CPT

## 2018-06-20 ENCOUNTER — FORM ENCOUNTER (OUTPATIENT)
Age: 58
End: 2018-06-20

## 2018-06-21 ENCOUNTER — APPOINTMENT (OUTPATIENT)
Dept: ENDOVASCULAR SURGERY | Facility: CLINIC | Age: 58
End: 2018-06-21
Payer: MEDICAID

## 2018-06-21 PROCEDURE — 36902Z: CUSTOM

## 2018-06-21 PROCEDURE — 36907Z: CUSTOM

## 2018-06-27 ENCOUNTER — APPOINTMENT (OUTPATIENT)
Dept: ENDOVASCULAR SURGERY | Facility: CLINIC | Age: 58
End: 2018-06-27

## 2018-07-02 ENCOUNTER — FORM ENCOUNTER (OUTPATIENT)
Age: 58
End: 2018-07-02

## 2018-07-03 ENCOUNTER — APPOINTMENT (OUTPATIENT)
Dept: ENDOVASCULAR SURGERY | Facility: CLINIC | Age: 58
End: 2018-07-03
Payer: MEDICAID

## 2018-07-03 ENCOUNTER — INPATIENT (INPATIENT)
Facility: HOSPITAL | Age: 58
LOS: 2 days | Discharge: HOME | DRG: 314 | End: 2018-07-06
Attending: HOSPITALIST | Admitting: HOSPITALIST
Payer: MEDICAID

## 2018-07-03 ENCOUNTER — OTHER (OUTPATIENT)
Age: 58
End: 2018-07-03

## 2018-07-03 VITALS
HEART RATE: 78 BPM | TEMPERATURE: 98 F | DIASTOLIC BLOOD PRESSURE: 97 MMHG | OXYGEN SATURATION: 98 % | SYSTOLIC BLOOD PRESSURE: 163 MMHG | RESPIRATION RATE: 18 BRPM

## 2018-07-03 DIAGNOSIS — I77.0 ARTERIOVENOUS FISTULA, ACQUIRED: Chronic | ICD-10-CM

## 2018-07-03 DIAGNOSIS — E87.5 HYPERKALEMIA: ICD-10-CM

## 2018-07-03 DIAGNOSIS — Z99.2 DEPENDENCE ON RENAL DIALYSIS: Chronic | ICD-10-CM

## 2018-07-03 DIAGNOSIS — T85.898A OTHER SPECIFIED COMPLICATION OF OTHER INTERNAL PROSTHETIC DEVICES, IMPLANTS AND GRAFTS, INITIAL ENCOUNTER: Chronic | ICD-10-CM

## 2018-07-03 DIAGNOSIS — N18.6 END STAGE RENAL DISEASE: ICD-10-CM

## 2018-07-03 LAB
ALBUMIN SERPL ELPH-MCNC: 4.6 G/DL — SIGNIFICANT CHANGE UP (ref 3.3–5)
ALP SERPL-CCNC: 179 U/L — HIGH (ref 40–120)
ALT FLD-CCNC: <5 U/L — LOW (ref 10–45)
ANION GAP SERPL CALC-SCNC: 18 MMOL/L — HIGH (ref 5–17)
ANISOCYTOSIS BLD QL: SLIGHT — SIGNIFICANT CHANGE UP
APTT BLD: 100.2 SEC — HIGH (ref 27.5–37.4)
AST SERPL-CCNC: 229 U/L — HIGH (ref 10–40)
BASE EXCESS BLDV CALC-SCNC: -7.4 MMOL/L — LOW (ref -2–2)
BASOPHILS # BLD AUTO: 0 K/UL — SIGNIFICANT CHANGE UP (ref 0–0.2)
BASOPHILS NFR BLD AUTO: 1 % — SIGNIFICANT CHANGE UP (ref 0–2)
BILIRUB SERPL-MCNC: 1.9 MG/DL — HIGH (ref 0.2–1.2)
BUN SERPL-MCNC: 51 MG/DL — HIGH (ref 7–23)
CA-I SERPL-SCNC: 0.92 MMOL/L — LOW (ref 1.12–1.3)
CALCIUM SERPL-MCNC: 7.9 MG/DL — LOW (ref 8.4–10.5)
CHLORIDE BLDV-SCNC: 104 MMOL/L — SIGNIFICANT CHANGE UP (ref 96–108)
CHLORIDE SERPL-SCNC: 99 MMOL/L — SIGNIFICANT CHANGE UP (ref 96–108)
CO2 BLDV-SCNC: 21 MMOL/L — LOW (ref 22–30)
CO2 SERPL-SCNC: 14 MMOL/L — LOW (ref 22–31)
CREAT SERPL-MCNC: 6.93 MG/DL — HIGH (ref 0.5–1.3)
ELLIPTOCYTES BLD QL SMEAR: SLIGHT — SIGNIFICANT CHANGE UP
EOSINOPHIL # BLD AUTO: 0.3 K/UL — SIGNIFICANT CHANGE UP (ref 0–0.5)
EOSINOPHIL NFR BLD AUTO: 1 % — SIGNIFICANT CHANGE UP (ref 0–6)
GAS PNL BLDV: 124 MMOL/L — LOW (ref 136–145)
GAS PNL BLDV: SIGNIFICANT CHANGE UP
GAS PNL BLDV: SIGNIFICANT CHANGE UP
GLUCOSE BLDV-MCNC: 91 MG/DL — SIGNIFICANT CHANGE UP (ref 70–99)
GLUCOSE SERPL-MCNC: 100 MG/DL — HIGH (ref 70–99)
HCO3 BLDV-SCNC: 20 MMOL/L — LOW (ref 21–29)
HCT VFR BLD CALC: 53.1 % — HIGH (ref 34.5–45)
HCT VFR BLDA CALC: 50 % — SIGNIFICANT CHANGE UP (ref 39–50)
HGB BLD CALC-MCNC: 16.3 G/DL — HIGH (ref 11.5–15.5)
HGB BLD-MCNC: 16.9 G/DL — HIGH (ref 11.5–15.5)
INR BLD: 1.24 RATIO — HIGH (ref 0.88–1.16)
LACTATE BLDV-MCNC: 2.2 MMOL/L — HIGH (ref 0.7–2)
LYMPHOCYTES # BLD AUTO: 0.1 K/UL — LOW (ref 1–3.3)
LYMPHOCYTES # BLD AUTO: 9 % — LOW (ref 13–44)
MCHC RBC-ENTMCNC: 27 PG — SIGNIFICANT CHANGE UP (ref 27–34)
MCHC RBC-ENTMCNC: 31.8 GM/DL — LOW (ref 32–36)
MCV RBC AUTO: 84.7 FL — SIGNIFICANT CHANGE UP (ref 80–100)
MICROCYTES BLD QL: SLIGHT — SIGNIFICANT CHANGE UP
MONOCYTES # BLD AUTO: 0.7 K/UL — SIGNIFICANT CHANGE UP (ref 0–0.9)
MONOCYTES NFR BLD AUTO: 2 % — SIGNIFICANT CHANGE UP (ref 2–14)
NEUTROPHILS # BLD AUTO: 3.9 K/UL — SIGNIFICANT CHANGE UP (ref 1.8–7.4)
NEUTROPHILS NFR BLD AUTO: 87 % — HIGH (ref 43–77)
NT-PROBNP SERPL-SCNC: 1662 PG/ML — HIGH (ref 0–300)
PCO2 BLDV: 47 MMHG — SIGNIFICANT CHANGE UP (ref 35–50)
PH BLDV: 7.25 — LOW (ref 7.35–7.45)
PLAT MORPH BLD: NORMAL — SIGNIFICANT CHANGE UP
PLATELET # BLD AUTO: 300 K/UL — SIGNIFICANT CHANGE UP (ref 150–400)
PO2 BLDV: 34 MMHG — SIGNIFICANT CHANGE UP (ref 25–45)
POLYCHROMASIA BLD QL SMEAR: SLIGHT — SIGNIFICANT CHANGE UP
POTASSIUM BLDV-SCNC: >12 MMOL/L — SIGNIFICANT CHANGE UP (ref 3.5–5.3)
POTASSIUM SERPL-MCNC: >9 MMOL/L — CRITICAL HIGH (ref 3.5–5.3)
POTASSIUM SERPL-SCNC: >9 MMOL/L — CRITICAL HIGH (ref 3.5–5.3)
PROT SERPL-MCNC: 8.9 G/DL — HIGH (ref 6–8.3)
PROTHROM AB SERPL-ACNC: 13.5 SEC — HIGH (ref 9.8–12.7)
RBC # BLD: 6.27 M/UL — HIGH (ref 3.8–5.2)
RBC # FLD: 18 % — HIGH (ref 10.3–14.5)
RBC BLD AUTO: SIGNIFICANT CHANGE UP
SAO2 % BLDV: 51 % — LOW (ref 67–88)
SODIUM SERPL-SCNC: 131 MMOL/L — LOW (ref 135–145)
WBC # BLD: 20.1 K/UL — HIGH (ref 3.8–10.5)
WBC # FLD AUTO: 20.1 K/UL — HIGH (ref 3.8–10.5)

## 2018-07-03 PROCEDURE — 99223 1ST HOSP IP/OBS HIGH 75: CPT

## 2018-07-03 PROCEDURE — 36905Z: CUSTOM

## 2018-07-03 PROCEDURE — 99291 CRITICAL CARE FIRST HOUR: CPT

## 2018-07-03 PROCEDURE — 71045 X-RAY EXAM CHEST 1 VIEW: CPT | Mod: 26

## 2018-07-03 PROCEDURE — 36907Z: CUSTOM | Mod: 59

## 2018-07-03 PROCEDURE — 36215Z: CUSTOM | Mod: 59

## 2018-07-03 RX ORDER — FUROSEMIDE 40 MG
60 TABLET ORAL DAILY
Qty: 0 | Refills: 0 | Status: DISCONTINUED | OUTPATIENT
Start: 2018-07-04 | End: 2018-07-06

## 2018-07-03 RX ORDER — OXYCODONE AND ACETAMINOPHEN 5; 325 MG/1; MG/1
1 TABLET ORAL EVERY 4 HOURS
Qty: 0 | Refills: 0 | Status: DISCONTINUED | OUTPATIENT
Start: 2018-07-03 | End: 2018-07-06

## 2018-07-03 RX ORDER — AMLODIPINE BESYLATE 2.5 MG/1
5 TABLET ORAL
Qty: 0 | Refills: 0 | Status: DISCONTINUED | OUTPATIENT
Start: 2018-07-03 | End: 2018-07-04

## 2018-07-03 RX ORDER — IPRATROPIUM/ALBUTEROL SULFATE 18-103MCG
3 AEROSOL WITH ADAPTER (GRAM) INHALATION ONCE
Qty: 0 | Refills: 0 | Status: COMPLETED | OUTPATIENT
Start: 2018-07-03 | End: 2018-07-03

## 2018-07-03 RX ORDER — INSULIN HUMAN 100 [IU]/ML
5 INJECTION, SOLUTION SUBCUTANEOUS ONCE
Qty: 0 | Refills: 0 | Status: COMPLETED | OUTPATIENT
Start: 2018-07-03 | End: 2018-07-03

## 2018-07-03 RX ORDER — HEPARIN SODIUM 5000 [USP'U]/ML
5000 INJECTION INTRAVENOUS; SUBCUTANEOUS EVERY 8 HOURS
Qty: 0 | Refills: 0 | Status: DISCONTINUED | OUTPATIENT
Start: 2018-07-03 | End: 2018-07-06

## 2018-07-03 RX ORDER — FAMOTIDINE 10 MG/ML
20 INJECTION INTRAVENOUS DAILY
Qty: 0 | Refills: 0 | Status: DISCONTINUED | OUTPATIENT
Start: 2018-07-03 | End: 2018-07-04

## 2018-07-03 RX ORDER — INSULIN HUMAN 100 [IU]/ML
10 INJECTION, SOLUTION SUBCUTANEOUS ONCE
Qty: 0 | Refills: 0 | Status: DISCONTINUED | OUTPATIENT
Start: 2018-07-03 | End: 2018-07-03

## 2018-07-03 RX ORDER — CALCIUM GLUCONATE 100 MG/ML
2 VIAL (ML) INTRAVENOUS ONCE
Qty: 0 | Refills: 0 | Status: COMPLETED | OUTPATIENT
Start: 2018-07-03 | End: 2018-07-03

## 2018-07-03 RX ORDER — DEXTROSE 50 % IN WATER 50 %
50 SYRINGE (ML) INTRAVENOUS ONCE
Qty: 0 | Refills: 0 | Status: COMPLETED | OUTPATIENT
Start: 2018-07-03 | End: 2018-07-03

## 2018-07-03 RX ORDER — SODIUM POLYSTYRENE SULFONATE 4.1 MEQ/G
30 POWDER, FOR SUSPENSION ORAL ONCE
Qty: 0 | Refills: 0 | Status: COMPLETED | OUTPATIENT
Start: 2018-07-03 | End: 2018-07-03

## 2018-07-03 RX ORDER — OXYCODONE HYDROCHLORIDE 5 MG/1
5 TABLET ORAL ONCE
Qty: 0 | Refills: 0 | Status: DISCONTINUED | OUTPATIENT
Start: 2018-07-03 | End: 2018-07-03

## 2018-07-03 RX ORDER — CALCIUM ACETATE 667 MG
1334 TABLET ORAL
Qty: 0 | Refills: 0 | Status: DISCONTINUED | OUTPATIENT
Start: 2018-07-03 | End: 2018-07-06

## 2018-07-03 RX ORDER — OXYCODONE AND ACETAMINOPHEN 5; 325 MG/1; MG/1
1 TABLET ORAL ONCE
Qty: 0 | Refills: 0 | Status: DISCONTINUED | OUTPATIENT
Start: 2018-07-03 | End: 2018-07-03

## 2018-07-03 RX ADMIN — Medication 3 MILLILITER(S): at 20:05

## 2018-07-03 RX ADMIN — INSULIN HUMAN 5 UNIT(S): 100 INJECTION, SOLUTION SUBCUTANEOUS at 20:44

## 2018-07-03 RX ADMIN — SODIUM POLYSTYRENE SULFONATE 30 GRAM(S): 4.1 POWDER, FOR SUSPENSION ORAL at 20:43

## 2018-07-03 RX ADMIN — OXYCODONE HYDROCHLORIDE 5 MILLIGRAM(S): 5 TABLET ORAL at 20:49

## 2018-07-03 RX ADMIN — Medication 50 MILLILITER(S): at 20:05

## 2018-07-03 RX ADMIN — Medication 200 GRAM(S): at 20:04

## 2018-07-03 NOTE — H&P ADULT - PROBLEM SELECTOR PLAN 2
s/p thrombectomy by Dr. Dempsey, AV fistula functioning well at this time for HD.  Given hx of splenic infarct and now with thrombosis of AV fistula, unclear if patient may need treatment of her polycythemia or if there may be other cause of recurrent clotting.   Should have hematology consult in AM  Patient also with elevated WBC count, no obvious signs of infection at this time. ? if this may be reactive given procedure and thrombosis. Monitor WBC count, if still elevated will need to d/w hematology as well given pt's PCV

## 2018-07-03 NOTE — CONSULT NOTE ADULT - ASSESSMENT
57 y.of F with PMHx mentioned below ESRD on HD since dec [id number]. Unable to get HD today due to clotted access, now declotted by vascular surgery, with hyperkalemia, will need HD.

## 2018-07-03 NOTE — H&P ADULT - PROBLEM SELECTOR PLAN 3
Pt with cirrhosis of unclear etiology, scheduled to see hepatology at the end of this month.  However on labwork here now with elevated ALT as well as persistently elevated ALP, although AST WNL  Prior lab workup without clear etiology  ? if this may be a rare drug induced reaction?  Hold famotidine and amlodipine for now-can use alternative medications if patient symptomatic  Hepatology consult in AM

## 2018-07-03 NOTE — ED ADULT NURSE NOTE - OBJECTIVE STATEMENT
57 y.o female pmh polycythemia, CKD- on dialysis T, TR, Saturday sent to ED from Endocrinologist office on [address] for emergent dialysis. as per EMS pt went for dialysis today and while at dialysis they were unable to successfully access her fistula located in her left upper arm, pt was sent to her endo dr on [address] for a thrombectomy in the fistula which was unsuccessful in the office and pt was then sent to the ED. pt in need of emergent dialysis, found to be hypertensive, hyperkalemic, with peaked t waves on her EKG. pt denies any cp, sob, weakness, dizziness, numbness, tingling, or diff breathing. bp 155/109 upon ED arrival, HR in the 70s, EKG completed upon arrival, pt. placed on continuous cardiac monitor. labs and line obtained, results pending. safety and fall precautions maintained, call bell at the bedside and within reach.

## 2018-07-03 NOTE — H&P ADULT - NSHPLABSRESULTS_GEN_ALL_CORE
Labs personally reviewed:                        16.9   20.1  )-----------( 300      ( 03 Jul 2018 19:15 )             53.1       07-03    131<L>  |  99  |  51<H>  ----------------------------<  100<H>  >9.0<HH>   |  14<L>  |  6.93<H>    Ca    7.9<L>      03 Jul 2018 19:15    TPro  8.9<H>  /  Alb  4.6  /  TBili  1.9<H>  /  DBili  x   /  AST  229<H>  /  ALT  <5<L>  /  AlkPhos  179<H>  07-03            LIVER FUNCTIONS - ( 03 Jul 2018 19:15 )  Alb: 4.6 g/dL / Pro: 8.9 g/dL / ALK PHOS: 179 U/L / ALT: <5 U/L / AST: 229 U/L / GGT: x             PT/INR - ( 03 Jul 2018 19:15 )   PT: 13.5 sec;   INR: 1.24 ratio         PTT - ( 03 Jul 2018 19:15 )  PTT:100.2 sec    CXR with clear lungs  EKG reviewed

## 2018-07-03 NOTE — H&P ADULT - NSHPSOCIALHISTORY_GEN_ALL_CORE
Former smoker (10year,1/3ppd, quit 2015)  Patient reports drinking 1-2 drinks per week (usually a beer or two)

## 2018-07-03 NOTE — CONSULT NOTE ADULT - SUBJECTIVE AND OBJECTIVE BOX
[facility] DIVISION OF KIDNEY DISEASES AND HYPERTENSION -- INITIAL CONSULT NOTE  --------------------------------------------------------------------------------  HPI:        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  Splenomegaly: 2015  Hypertension  Peptic ulcer disease  Polycythemia vera  AV fistula: Placed 9/18  Hemoperitoneum as complication of peritoneal dialysis: s/p removal 9/18  Peritoneal dialysis catheter in place: Placed 8/9/2017    FAMILY HISTORY:  Family history of malignant neoplasm (Grandparent): head and neck in father  Family history of hypertension in mother    PAST SOCIAL HISTORY:    ALLERGIES & MEDICATIONS  --------------------------------------------------------------------------------  Allergies    No Known Allergies    Intolerances      Standing Inpatient Medications  ALBUTerol/ipratropium for Nebulization. 3 milliLiter(s) Nebulizer once  ALBUTerol/ipratropium for Nebulization. 3 milliLiter(s) Nebulizer once  ALBUTerol/ipratropium for Nebulization. 3 milliLiter(s) Nebulizer once  calcium gluconate IVPB 2 Gram(s) IV Intermittent Once  dextrose 50% Injectable 50 milliLiter(s) IV Push Once  insulin regular  human recombinant. 10 Unit(s) IV Push once    PRN Inpatient Medications      REVIEW OF SYSTEMS  --------------------------------------------------------------------------------  Gen: No weight changes, fatigue, fevers/chills, weakness  Skin: No rashes  Head/Eyes/Ears/Mouth: No headache; Normal hearing; Normal vision w/o blurriness; No sinus pain/discomfort, sore throat  Respiratory: No dyspnea, cough, wheezing, hemoptysis  CV: No chest pain, PND, orthopnea  GI: No abdominal pain, diarrhea, constipation, nausea, vomiting, melena, hematochezia  : No increased frequency, dysuria, hematuria, nocturia  MSK: No joint pain/swelling; no back pain; no edema  Neuro: No dizziness/lightheadedness, weakness, seizures, numbness, tingling  Heme: No easy bruising or bleeding  Endo: No heat/cold intolerance  Psych: No significant nervousness, anxiety, stress, depression    All other systems were reviewed and are negative, except as noted.    VITALS/PHYSICAL EXAM  --------------------------------------------------------------------------------  T(C): 36.6 (07-03-18 @ 18:41), Max: 36.6 (07-03-18 @ 18:41)  HR: 78 (07-03-18 @ 18:41) (78 - 78)  BP: 163/97 (07-03-18 @ 18:41) (163/97 - 163/97)  RR: 18 (07-03-18 @ 18:41) (18 - 18)  SpO2: 98% (07-03-18 @ 18:41) (98% - 98%)  Wt(kg): --        Physical Exam:  	Gen: NAD, well-appearing  	HEENT: PERRL, supple neck, clear oropharynx  	Pulm: CTA B/L  	CV: RRR, S1S2; no rub  	Back: No spinal or CVA tenderness; no sacral edema  	Abd: +BS, soft, nontender/nondistended  	: No suprapubic tenderness  	UE: Warm, FROM, no clubbing, intact strength; no edema; no asterixis  	LE: Warm, FROM, no clubbing, intact strength; no edema  	Neuro: No focal deficits, intact gait  	Psych: Normal affect and mood  	Skin: Warm, without rashes  	Vascular access:    LABS/STUDIES  --------------------------------------------------------------------------------              16.9   20.1  >-----------<  300      [07-03-18 @ 19:15]              53.1           PT/INR: PT 13.5 , INR 1.24       [07-03-18 @ 19:15]  PTT: 100.2      [07-03-18 @ 19:15]      Creatinine Trend:    Urinalysis - [03-11-18 @ 06:38]      Color PL Yellow / Appearance Clear / SG 1.015 / pH 8.5      Gluc Trace / Ketone Negative  / Bili Negative / Urobili Negative       Blood Negative / Protein 100 / Leuk Est Negative / Nitrite Negative      RBC 0-2 / WBC 3-5 / Hyaline  / Gran  / Sq Epi  / Non Sq Epi OCC / Bacteria Few      Iron 26, TIBC 68, %sat 38      [03-14-18 @ 10:48]  Ferritin 122      [03-14-18 @ 09:37]  HbA1c 4.3      [03-11-18 @ 09:35]  TSH 1.68      [03-11-18 @ 09:33]  Lipid: chol 107, TG 64, HDL 39, LDL 55      [03-11-18 @ 09:47]    HBsAb <3.0      [03-13-18 @ 15:04]  HBsAb Nonreact      [01-19-17 @ 20:01]  HBsAg Nonreact      [05-05-18 @ 15:09]  HBcAb Nonreact      [03-13-18 @ 15:04]  HCV 0.21, Nonreact      [05-05-18 @ 15:09]  HIV Nonreact      [01-19-17 @ 20:01]    RAHUL: titer 1:80, pattern Homogeneous      [05-16-18 @ 22:43]  C3 Complement 100      [01-19-17 @ 20:01]  C4 Complement 32      [01-19-17 @ 20:01]  Rheumatoid Factor <7.0      [01-19-17 @ 20:01]  ANCA: cANCA Negative, pANCA Negative, atypical ANCA Negative      [01-19-17 @ 20:01]  anti-GBM <0.2      [01-20-17 @ 01:52]  ASLO 59      [01-19-17 @ 20:01]  Syphilis Screen (Treponema Pallidum Ab) Negative      [01-19-17 @ 20:01]  Free Light Chains: kappa 12.00, lambda 14.10, ratio = 0.85      [01-23 @ 14:39]  Immunofixation Serum:   No Monoclonal Band Identified      [01-23-17 @ 14:39]  SPEP Interpretation: Polyclonal Gammopathy      [01-23-17 @ 14:39] [facility] DIVISION OF KIDNEY DISEASES AND HYPERTENSION -- INITIAL CONSULT NOTE  --------------------------------------------------------------------------------  HPI:    57 y.of F with PMHx mentioned below ESRD on HD since dec [id number]. Went for HD today however found to have clotted access, therefore was [facility], for declotting. Patient presented to ED asymptomatic, found to have K more than 9 but hemolyzed. EKG no significant changes. Patient denies sob, chest pain, nausea, vomiting, abdominal pain, diarrhea, fever or chills.        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  Splenomegaly: 2015  Hypertension  Peptic ulcer disease  Polycythemia vera  AV fistula: Placed 9/18  Hemoperitoneum as complication of peritoneal dialysis: s/p removal 9/18  Peritoneal dialysis catheter in place: Placed 8/9/2017    FAMILY HISTORY:  Family history of malignant neoplasm (Grandparent): head and neck in father  Family history of hypertension in mother    PAST SOCIAL HISTORY:    ALLERGIES & MEDICATIONS    Amlodipine 5 mg.   Pepcid.   Calcium acetate.   --------------------------------------------------------------------------------  Allergies    No Known Allergies    Intolerances      Standing Inpatient Medications  ALBUTerol/ipratropium for Nebulization. 3 milliLiter(s) Nebulizer once  ALBUTerol/ipratropium for Nebulization. 3 milliLiter(s) Nebulizer once  ALBUTerol/ipratropium for Nebulization. 3 milliLiter(s) Nebulizer once  calcium gluconate IVPB 2 Gram(s) IV Intermittent Once  dextrose 50% Injectable 50 milliLiter(s) IV Push Once  insulin regular  human recombinant. 10 Unit(s) IV Push once    PRN Inpatient Medications      REVIEW OF SYSTEMS  --------------------------------------------------------------------------------  Gen: No weight changes, fatigue, fevers/chills, weakness  Skin: No rashes  Head/Eyes/Ears/Mouth: No headache; Normal hearing; Normal vision w/o blurriness; No sinus pain/discomfort, sore throat  Respiratory: No dyspnea, cough, wheezing, hemoptysis  CV: No chest pain, PND, orthopnea  GI: No abdominal pain, diarrhea, constipation, nausea, vomiting, melena, hematochezia  : No increased frequency, dysuria, hematuria, nocturia  MSK: No joint pain/swelling; no back pain; no edema  Neuro: No dizziness/lightheadedness, weakness, seizures, numbness, tingling  Heme: No easy bruising or bleeding  Endo: No heat/cold intolerance  Psych: No significant nervousness, anxiety, stress, depression    All other systems were reviewed and are negative, except as noted.    VITALS/PHYSICAL EXAM  --------------------------------------------------------------------------------  T(C): 36.6 (07-03-18 @ 18:41), Max: 36.6 (07-03-18 @ 18:41)  HR: 78 (07-03-18 @ 18:41) (78 - 78)  BP: 163/97 (07-03-18 @ 18:41) (163/97 - 163/97)  RR: 18 (07-03-18 @ 18:41) (18 - 18)  SpO2: 98% (07-03-18 @ 18:41) (98% - 98%)  Wt(kg): --        Physical Exam:  	Gen: NAD, well-appearing  	HEENT: PERRL, supple neck, clear oropharynx  	Pulm: CTA B/L  	CV: RRR, S1S2; no rub  	Back: No spinal or CVA tenderness; no sacral edema  	Abd: +BS, soft, nontender/nondistended  	: No suprapubic tenderness  	UE: Warm, FROM, no clubbing, intact strength; no edema; no asterixis  	LE: Warm, FROM, no clubbing, intact strength; no edema  	Neuro: No focal deficits, intact gait  	Psych: Normal affect and mood  	Skin: Warm, without rashes  	Vascular access: Left av fistula with thrill and bruit.     LABS/STUDIES  --------------------------------------------------------------------------------              16.9   20.1  >-----------<  300      [07-03-18 @ 19:15]              53.1       PT/INR: PT 13.5 , INR 1.24       [07-03-18 @ 19:15]  PTT: 100.2      [07-03-18 @ 19:15]

## 2018-07-03 NOTE — ED PROVIDER NOTE - MUSCULOSKELETAL, MLM
Spine appears normal, range of motion is not limited, no muscle or joint tenderness. AVF to LUE with palpable thrill

## 2018-07-03 NOTE — ED PROVIDER NOTE - MEDICAL DECISION MAKING DETAILS
hyperkalemia hyperkalemia    AUSTIN Bahena MD: 56 y/o female with PMH ESRD on HD (T/Th/Sat), HTN, PUD, splenic infarct, splenomegaly who is sent in for hyperkalemia requiring HD. Pt went to her HD center today and they were unable to access her AVF. She then went to get a thrombectomy at an outpt center, and now sent in by her Nephrologist Dr. De León for emergent HD for hyperkalemia. Pt has no complaints at this time. Plan: labs, hyperkalemia cocktail, ekg, cardiac monitor, stat Nephrology consult to arrange for emergent hemodialysis

## 2018-07-03 NOTE — H&P ADULT - PMH
Cirrhosis of liver without ascites, unspecified hepatic cirrhosis type    ESRD on peritoneal dialysis    Hypertension    Peptic ulcer disease    Polycythemia vera    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  Splenomegaly  2015

## 2018-07-03 NOTE — ED ADULT NURSE REASSESSMENT NOTE - NS ED NURSE REASSESS COMMENT FT1
Report given to Cristiano BANKS on [facility], Pt currently at dialysis, going straight from dialysis to 6 RID[facility] RN aware.

## 2018-07-03 NOTE — H&P ADULT - HISTORY OF PRESENT ILLNESS
57F [place] ([place] 13 [place] w/ hx of GN w/ ESRD on HD (T/Th/Sa), hx of pancreatic cyst, polycythemia vera c/b splenomegaly and splenic vein thrombosis w/ infarct, recently found with likely cirrhosis on CT w/ grade 1 gastric varices of unclear etiology (has had extensive workup already including for hep B/C as well as for cash's/hemochromatosis/autoimmune hepatitis), Former smoker (10year,1/3ppd, quit 2015), PUD/GERD on H1 blockade, now presenting with hyperkalemia and s/p thrombectomy for AV fistula thrombosis. 57F [place] 13 [place] w/ hx of GN w/ ESRD on HD (T/Th/Sa), hx of pancreatic cyst, polycythemia vera c/b splenomegaly and splenic vein thrombosis w/ infarct, recently found with likely cirrhosis on CT w/ grade 1 gastric varices of unclear etiology (has had extensive workup already including for hep B/C as well as for cash's/hemochromatosis/autoimmune hepatitis), Former smoker (10year,1/3ppd, quit 2015), PUD/GERD on H1 blockade, now presenting with hyperkalemia and s/p thrombectomy for AV fistula thrombosis. Patient reports that she had reported to her dialysis center today for dialysis when her AV fistula was noted to be nonfunctioning due to likely thrombosis. She was sent to a vascular access center and seen by Dr. Dempsey her vascular surgeon and she underwent a thrombectomy. However, labwork at dialysis center showed patient had hyperkalemia with K+ of 6.4. After her thrombectomy patient was thus sent to [facility] for urgent hemodialysis. Patient denies any chest pain or palpations. Denies any fevers or chills, denies any leg swelling or pain. No known hx of DVTs, however prior splenic infarct had been felt possibly 2/2 her polycythemia. Patient is not on blood thinners, and had previously been considered for taking aspirin but due to her PUD/GERD this had not been done. Per patient she is trying to find a new hematologist as her previous hematologist had quit? She is due to see her new hematologist this month. Patient also reports that she has a hepatology appointment at the end of the month for workup of her cirrhosis. Patient does complain of headache with HD (is undergoing dialysis at this time) and that in the past this has occurred for her s/p HD sessions as well as muscle aches around her neck area.  Patient also complains of new b/l foot pains, mild to moderate with weight bearing in her right heel and left forefoot. She has not had workup of this but this has been ongoing x 1 month. No radiation of this pain, and pain is sore in character. No dysuria, no coughing, no SOB, no chest pain, no diarrhea, no constipation. Pt does still make small amounts of urine daily with furosemide

## 2018-07-03 NOTE — H&P ADULT - ASSESSMENT
57F [place] 13 [place] w/ hx of GN w/ ESRD on HD (T/Th/Sa), hx of pancreatic cyst, polycythemia vera c/b splenomegaly and splenic vein thrombosis w/ infarct, recently found with likely cirrhosis on CT w/ grade 1 gastric varices of unclear etiology (has had extensive workup already including for hep B/C as well as for cash's/hemochromatosis/autoimmune hepatitis), Former smoker (10year,1/3ppd, quit 2015), PUD/GERD on H1 blockade, now presenting with hyperkalemia and s/p thrombectomy for AV fistula thrombosis

## 2018-07-03 NOTE — CONSULT NOTE ADULT - PROBLEM SELECTOR RECOMMENDATION 2
Potassium of 9 sample hemolyzed, outpatient labs were 6.3  Monitor K after HD.   Received Hyperkalemia cocktail in ED.

## 2018-07-03 NOTE — H&P ADULT - NSHPPHYSICALEXAM_GEN_ALL_CORE
Vital Signs Last 24 Hrs  T(C): 36.8 (03 Jul 2018 20:53), Max: 36.8 (03 Jul 2018 20:53)  T(F): 98.2 (03 Jul 2018 20:53), Max: 98.2 (03 Jul 2018 20:53)  HR: 80 (03 Jul 2018 20:53) (78 - 80)  BP: 159/92 (03 Jul 2018 20:53) (159/92 - 163/97)  BP(mean): --  RR: 18 (03 Jul 2018 20:53) (18 - 18)  SpO2: 97% (03 Jul 2018 20:53) (97% - 98%)    GENERAL: No acute distress, well-developed  HEAD:  Atraumatic, Normocephalic  EYES: EOMI, PERRLA, conjunctiva and sclera clear  ENT: Oral mucosa moist  NECK: Neck supple  CHEST/LUNG: Clear to auscultation bilaterally; No wheeze, no rales, no rhonchi.    HEART: Regular rate and rhythm; No murmurs, rubs, or gallops  ABDOMEN: Soft, Nontender, Nondistended; Bowel sounds present  EXTREMITIES:  No clubbing, cyanosis, or edema. Examination of right heel without significant edema/erythema and is not TTP. Left forefoot mildly TTP on plantar aspect but no significant edema/swelling or erythema is noted.  VASCULAR: Posterior tibialis pulses intact bilaterally  PSYCH: Normal behavior, normal affect  NEUROLOGY: AAOx3  SKIN: grossly warm and dry

## 2018-07-03 NOTE — ED PROVIDER NOTE - OBJECTIVE STATEMENT
58 y/o female with PMH ESRD on HD (T/Th/Sat), HTN, PUD, splenic infarct, splenomegaly who is sent in for hyperkalemia requiring HD. Pt went to her HD center today and they were unable to access her AVF. She then went to get a thrombectomy at an outpt center, and now sent in by her Nephrologist Dr. De León for emergent HD for hyperkalemia. Pt has no complaints at this time.

## 2018-07-04 DIAGNOSIS — K74.60 UNSPECIFIED CIRRHOSIS OF LIVER: ICD-10-CM

## 2018-07-04 DIAGNOSIS — T82.868D THROMBOSIS DUE TO VASCULAR PROSTHETIC DEVICES, IMPLANTS AND GRAFTS, SUBSEQUENT ENCOUNTER: ICD-10-CM

## 2018-07-04 LAB
ALBUMIN SERPL ELPH-MCNC: 3.9 G/DL — SIGNIFICANT CHANGE UP (ref 3.3–5)
ALP SERPL-CCNC: 173 U/L — HIGH (ref 40–120)
ALT FLD-CCNC: 10 U/L — SIGNIFICANT CHANGE UP (ref 10–45)
ANION GAP SERPL CALC-SCNC: 20 MMOL/L — HIGH (ref 5–17)
AST SERPL-CCNC: 18 U/L — SIGNIFICANT CHANGE UP (ref 10–40)
BASOPHILS # BLD AUTO: 0.08 K/UL — SIGNIFICANT CHANGE UP (ref 0–0.2)
BASOPHILS NFR BLD AUTO: 0.5 % — SIGNIFICANT CHANGE UP (ref 0–2)
BILIRUB SERPL-MCNC: 1.6 MG/DL — HIGH (ref 0.2–1.2)
BUN SERPL-MCNC: 17 MG/DL — SIGNIFICANT CHANGE UP (ref 7–23)
CALCIUM SERPL-MCNC: 8.5 MG/DL — SIGNIFICANT CHANGE UP (ref 8.4–10.5)
CHLORIDE SERPL-SCNC: 92 MMOL/L — LOW (ref 96–108)
CO2 SERPL-SCNC: 23 MMOL/L — SIGNIFICANT CHANGE UP (ref 22–31)
CREAT SERPL-MCNC: 4.23 MG/DL — HIGH (ref 0.5–1.3)
EOSINOPHIL # BLD AUTO: 0.33 K/UL — SIGNIFICANT CHANGE UP (ref 0–0.5)
EOSINOPHIL NFR BLD AUTO: 2 % — SIGNIFICANT CHANGE UP (ref 0–6)
GLUCOSE SERPL-MCNC: 93 MG/DL — SIGNIFICANT CHANGE UP (ref 70–99)
HCT VFR BLD CALC: 48.3 % — HIGH (ref 34.5–45)
HGB BLD-MCNC: 15.4 G/DL — SIGNIFICANT CHANGE UP (ref 11.5–15.5)
IMM GRANULOCYTES NFR BLD AUTO: 0.4 % — SIGNIFICANT CHANGE UP (ref 0–1.5)
LYMPHOCYTES # BLD AUTO: 1.04 K/UL — SIGNIFICANT CHANGE UP (ref 1–3.3)
LYMPHOCYTES # BLD AUTO: 6.3 % — LOW (ref 13–44)
MAGNESIUM SERPL-MCNC: 2 MG/DL — SIGNIFICANT CHANGE UP (ref 1.6–2.6)
MCHC RBC-ENTMCNC: 26.2 PG — LOW (ref 27–34)
MCHC RBC-ENTMCNC: 31.9 GM/DL — LOW (ref 32–36)
MCV RBC AUTO: 82.3 FL — SIGNIFICANT CHANGE UP (ref 80–100)
MONOCYTES # BLD AUTO: 0.68 K/UL — SIGNIFICANT CHANGE UP (ref 0–0.9)
MONOCYTES NFR BLD AUTO: 4.1 % — SIGNIFICANT CHANGE UP (ref 2–14)
NEUTROPHILS # BLD AUTO: 14.36 K/UL — HIGH (ref 1.8–7.4)
NEUTROPHILS NFR BLD AUTO: 86.7 % — HIGH (ref 43–77)
PHOSPHATE SERPL-MCNC: 4.5 MG/DL — SIGNIFICANT CHANGE UP (ref 2.5–4.5)
PLATELET # BLD AUTO: 185 K/UL — SIGNIFICANT CHANGE UP (ref 150–400)
POTASSIUM SERPL-MCNC: 3.8 MMOL/L — SIGNIFICANT CHANGE UP (ref 3.5–5.3)
POTASSIUM SERPL-MCNC: 3.9 MMOL/L — SIGNIFICANT CHANGE UP (ref 3.5–5.3)
POTASSIUM SERPL-SCNC: 3.8 MMOL/L — SIGNIFICANT CHANGE UP (ref 3.5–5.3)
POTASSIUM SERPL-SCNC: 3.9 MMOL/L — SIGNIFICANT CHANGE UP (ref 3.5–5.3)
PROT SERPL-MCNC: 7.7 G/DL — SIGNIFICANT CHANGE UP (ref 6–8.3)
RBC # BLD: 5.87 M/UL — HIGH (ref 3.8–5.2)
RBC # FLD: 17.2 % — HIGH (ref 10.3–14.5)
SODIUM SERPL-SCNC: 135 MMOL/L — SIGNIFICANT CHANGE UP (ref 135–145)
WBC # BLD: 16.55 K/UL — HIGH (ref 3.8–10.5)
WBC # FLD AUTO: 16.55 K/UL — HIGH (ref 3.8–10.5)

## 2018-07-04 PROCEDURE — 99233 SBSQ HOSP IP/OBS HIGH 50: CPT

## 2018-07-04 RX ORDER — OXYCODONE HYDROCHLORIDE 5 MG/1
5 TABLET ORAL ONCE
Qty: 0 | Refills: 0 | Status: DISCONTINUED | OUTPATIENT
Start: 2018-07-04 | End: 2018-07-04

## 2018-07-04 RX ADMIN — Medication 1334 MILLIGRAM(S): at 17:25

## 2018-07-04 RX ADMIN — OXYCODONE HYDROCHLORIDE 5 MILLIGRAM(S): 5 TABLET ORAL at 07:10

## 2018-07-04 RX ADMIN — Medication 1334 MILLIGRAM(S): at 10:42

## 2018-07-04 RX ADMIN — HEPARIN SODIUM 5000 UNIT(S): 5000 INJECTION INTRAVENOUS; SUBCUTANEOUS at 13:24

## 2018-07-04 RX ADMIN — HEPARIN SODIUM 5000 UNIT(S): 5000 INJECTION INTRAVENOUS; SUBCUTANEOUS at 22:17

## 2018-07-04 RX ADMIN — HEPARIN SODIUM 5000 UNIT(S): 5000 INJECTION INTRAVENOUS; SUBCUTANEOUS at 06:14

## 2018-07-04 RX ADMIN — OXYCODONE HYDROCHLORIDE 5 MILLIGRAM(S): 5 TABLET ORAL at 07:30

## 2018-07-04 RX ADMIN — Medication 1334 MILLIGRAM(S): at 13:24

## 2018-07-04 RX ADMIN — Medication 60 MILLIGRAM(S): at 06:14

## 2018-07-04 RX ADMIN — OXYCODONE AND ACETAMINOPHEN 1 TABLET(S): 5; 325 TABLET ORAL at 00:17

## 2018-07-04 RX ADMIN — OXYCODONE AND ACETAMINOPHEN 1 TABLET(S): 5; 325 TABLET ORAL at 00:47

## 2018-07-04 NOTE — CHART NOTE - NSCHARTNOTEFT_GEN_A_CORE
I have seen the patient and reviewed dialysis prescription and flow sheet. Dialysis access is functioning well- left arm AVF. Patient is tolerating dialysis well with no acute symptoms or distress. Dialysis prescription has been adjusted for optimized control of volume status, uremia and electrolytes. Management of additional metabolic abnormalities/anemia will continue to be addressed on follow up.  VS noted.  Plan: meenu Silver MD  [phone]

## 2018-07-04 NOTE — CHART NOTE - NSCHARTNOTEFT_GEN_A_CORE
Notified by RN that patient c/o HA, hesitant to take percocet in setting of her liver cirrhosis and acutely elevated LFT. Seen and examined the patient. Patient in no distress, c/o HA which patient attributes to post dialysis event and states that hx of HA post dialysis, for which she takes pain medications.     Vital Signs Last 24 Hrs  T(C): 36.7 (04 Jul 2018 05:08), Max: 36.9 (03 Jul 2018 22:03)  T(F): 98 (04 Jul 2018 05:08), Max: 98.4 (03 Jul 2018 22:03)  HR: 89 (04 Jul 2018 05:08) (78 - 98)  BP: 110/72 (04 Jul 2018 05:08) (110/72 - 185/102)  BP(mean): --  RR: 18 (04 Jul 2018 05:08) (18 - 18)  SpO2: 94% (04 Jul 2018 05:08) (94% - 98%)      57F [place] 13 [place] w/ hx of GN w/ ESRD on HD (T/Th/Sa), hx of pancreatic cyst, polycythemia vera c/b splenomegaly and splenic vein thrombosis w/ infarct, recently found with likely cirrhosis on CT w/ grade 1 gastric varices of unclear etiology (has had extensive workup already including for hep B/C as well as for cash's/hemochromatosis/autoimmune hepatitis), Former smoker (10year,1/3ppd, quit 2015), PUD/GERD on H1 blockade,admitted with  hyperkalemia and s/p thrombectomy for AV fistula thrombosis, for urgent dialysis.        Problem/Plan - 1:  ·  Problem: Hyperkalemia.  Plan: Here with hyperkalemia.  Now receiving HD  Nephrology consult appreciated  Recheck labwork in AM.      Problem/Plan - 2:  ·  Problem: Thrombosis of arteriovenous fistula, subsequent encounter.  Plan: s/p thrombectomy by Dr. Dempsey, AV fistula functioning well at this time for HD.  Given hx of splenic infarct and now with thrombosis of AV fistula, unclear if patient may need treatment of her polycythemia or if there may be other cause of recurrent clotting.   Should have hematology consult in AM  Patient also with elevated WBC count, no obvious signs of infection at this time. ? if this may be reactive given procedure and thrombosis. Monitor WBC count, if still elevated will need to d/w hematology as well given pt's PCV.      Problem/Plan - 3:  ·  Problem: Cirrhosis of liver without ascites, unspecified hepatic cirrhosis type.  Plan: Pt with cirrhosis of unclear etiology, scheduled to see hepatology at the end of this month.  However on labwork here now with elevated ALT as well as persistently elevated ALP, although AST WNL  Prior lab workup without clear etiology  ? if this may be a rare drug induced reaction?  Hold famotidine and amlodipine for now-can use alternative medications if patient symptomatic  Hepatology consult in AM.      Problem/Plan - 4:  ·  Problem: ESRD on dialysis.  Plan: receiving HD now  HD T/T/S  renally dose medications. Notified by RN that patient c/o HA, hesitant to take percocet in setting of her liver cirrhosis and acutely elevated LFT. Seen and examined the patient. Patient in no distress, c/o HA which patient attributes to post dialysis event and states that hx of HA post dialysis, for which she takes pain medications. Patient Yakut speaking, communicated with the patient  through her daughter per patient request, as patient declined .     Vital Signs Last 24 Hrs  T(C): 36.7 (04 Jul 2018 05:08), Max: 36.9 (03 Jul 2018 22:03)  T(F): 98 (04 Jul 2018 05:08), Max: 98.4 (03 Jul 2018 22:03)  HR: 89 (04 Jul 2018 05:08) (78 - 98)  BP: 110/72 (04 Jul 2018 05:08) (110/72 - 185/102)  BP(mean): --  RR: 18 (04 Jul 2018 05:08) (18 - 18)  SpO2: 94% (04 Jul 2018 05:08) (94% - 98%)    Physical exam  Neuro; A&ox3, no focal defecit, neuro status grossly intact  Pulm: CTA B/L  CVS: S1S2 RRR  Abdomen: soft, NT.ND  vascular: AVF present on left arm, no swelling/cyanosis, peripheral pulses strong on all 4 extremities      56Y/O F [place] 13 [place] w/ hx of GN w/ ESRD on HD (T/Th/Sa), hx of pancreatic cyst, polycythemia vera c/b splenomegaly and splenic vein thrombosis w/ infarct, recently found with likely cirrhosis on CT w/ grade 1 gastric varices of unclear etiology (has had extensive workup already including for hep B/C as well as for cash's/ hemochromatosis /autoimmune hepatitis), Former smoker (10year,1/3ppd, quit 2015), PUD/GERD on H1 blockade, admitted with  hyperkalemia and s/p thrombectomy for AV fistula thrombosis, for urgent dialysis.  Head ache; chronic per patient, post HD likely r/t fluid shift  Oxycodone 5mg PO one dose now  If patient develops neuro symptoms will consider CTH  Will continue to monitor  Will update with primary team    Manda Graham P BC  [id number]

## 2018-07-04 NOTE — PATIENT PROFILE ADULT. - FUNCTIONAL SCREEN CURRENT LEVEL: BATHING, MLM
[facility]   [address], [address]  [place] [address]  Phone: [phone]  Fax: [phone]              Jayna Lewis  1942    Encounter Date: 10/30/2017    Janes Wilhelm M.D.          PROGRESS NOTE:  Subjective:   Jayna Lewis is a 75 y.o. female who presents today with recent PVI. No real recurrence post one week. On low dose Tambcor. Feels well. Old flutter RFA. No chest pain or SOB. PPM nl function.    Past Medical History:   Diagnosis Date   • Aneurysm of aortic root (CMS-HCC) 2011   • Atrial flutter (CMS-HCC) 2011   • Benign essential hypertension 2011   • Cancer (CMS-HCC) 1985    skin- basal cell   • Carotid artery stenosis 2011   • CATARACT     saadia IOL   • Dizziness 2011   • Encounter for long-term (current) use of other medications 2011   • History of depression 2011   • History of echocardiogram 2011   • History of myocardial perfusion scan 2011   • HLD (hyperlipidemia) 2011   • Infectious disease 2013    pt acquired infection post op and states she almost , pt unsure of what the infection was   • Long term (current) use of anticoagulants 2011   • Other specified disorder of intestines     constipation; IBS   • Pacemaker    • Paroxysmal atrial fibrillation (CMS-HCC) 2011   • Presence of permanent cardiac pacemaker 2011   • Psychiatric problem     depression   • Pulmonary embolism (CMS-HCC) 2011   • Sleep apnea 2011    pt no longer uses cpap, pt was told by md that she no longer had sleep apnea s/p weight loss   • Snoring     sleep study done   • SSS (sick sinus syndrome) (CMS-HCC) 2011   • Stroke (CMS-HCC)     TIA- suspected   • TIA (transient ischemic attack) 2011   • Unspecified urinary incontinence      Past Surgical History:   Procedure Laterality Date   • RECTOCELE REPAIR  2014    Performed by Tanvir Eden M.D. at SURGERY SAME DAY  "[facility]   • BLADDER SLING FEMALE  2/4/2014    Performed by Tanvir Eden M.D. at SURGERY SAME DAY [facility]   • CYSTOSCOPY  2/4/2014    Performed by Tanvir Eden M.D. at SURGERY SAME DAY [facility]   • OTHER  2011    pulmonary embolism   • OTHER CARDIAC SURGERY  2010    CATHETER ABLATION - ATRIAL FLUTTER.   • PACEMAKER INSERTION  2009   • HYSTERECTOMY, TOTAL ABDOMINAL     • OTHER      BLADDER SURGERY.   • TONSILLECTOMY       History reviewed. No pertinent family history.  History   Smoking Status   • Never Smoker   Smokeless Tobacco   • Never Used     Allergies   Allergen Reactions   • Levaquin Hives and Swelling     Hives, swelling in throat   • Macrobid [Nitrofurantoin Macrocrystal] Hives and Swelling     Hives, swelling in throat   • Multaq [Dronedarone] Hives and Swelling     Hives, swelling in throat   • Sotalol      \"dizzy\", feels off balance     Outpatient Encounter Prescriptions as of 10/30/2017   Medication Sig Dispense Refill   • ezetimibe (ZETIA) 10 MG Tab TAKE 0.5 TABLET BY MOUTH EVERY DAY 45 Tab 3   • metoprolol (LOPRESSOR) 25 MG Tab TAKE 1 TABLET BY MOUTH TWICE DAILY 60 Tab 11   • rivaroxaban (XARELTO) 20 MG Tab tablet Take 1 Tab by mouth with dinner. 90 Tab 3   • flecainide (TAMBOCOR) 50 MG tablet Take 1 Tab by mouth 2 times a day. May take additional dose of 50mg PO as needed for Atrial Fibrillation/Palpitations with total of 100mg PO BID. 180 Tab 3   • Cyanocobalamin (VITAMIN B 12 PO) Take  by mouth.     • Probiotic Product (PROBIOTIC DAILY PO) Take  by mouth.     • Biotin 5000 MCG Cap Take  by mouth.     • methenamine hip (HIPPREX) 1 GM TABS Take 1 g by mouth every day.     • Ascorbic Acid (VITAMIN C) 500 MG CAPS Take 500 mg by mouth every day.     • Omega-3 Fatty Acids (FISH OIL) 1200 MG CAPS Take 1,000 mg by mouth every day.     • Calcium Carbonate-Vit D-Min (CALCIUM 1200 PO) Take 1 Tab by mouth every day.     • conjugated estrogen (PREMARIN) 0.625 MG/GM CREA Insert 0.5 g in " "vagina See Admin Instructions. Monday PM  Friday AM     • Multiple Vitamin (MULTIVITAMIN PO) Take 1 Tab by mouth every day.     • Glucos-MSM-C-Bf-Girgbj-Jauduw (GLUCOSAMINE MSM COMPLEX) TABS Take 2 Tabs by mouth every day. 1200 mg     • sertraline (ZOLOFT) 50 MG Tab      • omeprazole (PRILOSEC) 20 MG delayed-release capsule Take 1 Cap by mouth every morning before breakfast. 30 Cap 0     No facility-administered encounter medications on file as of 10/30/2017.      ROS     Objective:   /72   Pulse 61   Ht 1.676 m (5' 5.98\")   Wt 88.5 kg (195 lb)   LMP  (LMP Unknown)   SpO2 94%   BMI 31.49 kg/m²      Physical Exam   Constitutional: She is oriented to person, place, and time. She appears well-developed. No distress.   HENT:   Mouth/Throat: Mucous membranes are normal.   Eyes: Conjunctivae and EOM are normal.   Neck: No JVD present. No tracheal deviation present. No thyroid mass and no thyromegaly present.   Cardiovascular: Normal rate, regular rhythm and intact distal pulses.    No murmur heard.  Pulmonary/Chest: Effort normal and breath sounds normal. No respiratory distress. She exhibits no tenderness.   Abdominal: Soft. There is no tenderness.   Musculoskeletal: Normal range of motion. She exhibits no edema.   Neurological: She is alert and oriented to person, place, and time. She has normal strength. She displays no tremor.   Skin: Skin is warm and dry. She is not diaphoretic.   Psychiatric: She has a normal mood and affect. Her behavior is normal.   Vitals reviewed.      Assessment:     1. Presence of permanent cardiac pacemaker     2. Paroxysmal atrial fibrillation (CMS-HCC)     3. S/P ablation of atrial fibrillation     4. S/P ablation of atrial flutter         Medical Decision Making:  Today's Assessment / Status / Plan:   1. PAF post PVI continue Tambocor. [place]. She wants to stay on 1C until then.  2. Anticoagulation continue.  3. PM nl function.  4. F/U in 4 months.      Vu " OSMAN Vila M.D.  [address] [id number]  VIA Facsimile: [phone]                  (0) independent

## 2018-07-05 ENCOUNTER — TRANSCRIPTION ENCOUNTER (OUTPATIENT)
Age: 58
End: 2018-07-05

## 2018-07-05 DIAGNOSIS — Z29.9 ENCOUNTER FOR PROPHYLACTIC MEASURES, UNSPECIFIED: ICD-10-CM

## 2018-07-05 DIAGNOSIS — D73.5 INFARCTION OF SPLEEN: ICD-10-CM

## 2018-07-05 DIAGNOSIS — D45 POLYCYTHEMIA VERA: ICD-10-CM

## 2018-07-05 DIAGNOSIS — I10 ESSENTIAL (PRIMARY) HYPERTENSION: ICD-10-CM

## 2018-07-05 DIAGNOSIS — E83.39 OTHER DISORDERS OF PHOSPHORUS METABOLISM: ICD-10-CM

## 2018-07-05 LAB
ALBUMIN SERPL ELPH-MCNC: 3.9 G/DL — SIGNIFICANT CHANGE UP (ref 3.3–5)
ALP SERPL-CCNC: 153 U/L — HIGH (ref 40–120)
ALT FLD-CCNC: 6 U/L — LOW (ref 10–45)
ANION GAP SERPL CALC-SCNC: 17 MMOL/L
ANION GAP SERPL CALC-SCNC: 22 MMOL/L — HIGH (ref 5–17)
AST SERPL-CCNC: 14 U/L — SIGNIFICANT CHANGE UP (ref 10–40)
BILIRUB SERPL-MCNC: 1.7 MG/DL — HIGH (ref 0.2–1.2)
BUN SERPL-MCNC: 27 MG/DL — HIGH (ref 7–23)
BUN SERPL-MCNC: 50 MG/DL
CALCIUM SERPL-MCNC: 8.2 MG/DL
CALCIUM SERPL-MCNC: 8.4 MG/DL — SIGNIFICANT CHANGE UP (ref 8.4–10.5)
CHLORIDE SERPL-SCNC: 102 MMOL/L
CHLORIDE SERPL-SCNC: 94 MMOL/L — LOW (ref 96–108)
CO2 SERPL-SCNC: 20 MMOL/L
CO2 SERPL-SCNC: 24 MMOL/L — SIGNIFICANT CHANGE UP (ref 22–31)
CREAT SERPL-MCNC: 6.41 MG/DL — HIGH (ref 0.5–1.3)
CREAT SERPL-MCNC: 7.28 MG/DL
GLUCOSE SERPL-MCNC: 104 MG/DL
GLUCOSE SERPL-MCNC: 97 MG/DL — SIGNIFICANT CHANGE UP (ref 70–99)
HCT VFR BLD CALC: 46.4 % — HIGH (ref 34.5–45)
HGB BLD-MCNC: 14.7 G/DL — SIGNIFICANT CHANGE UP (ref 11.5–15.5)
MCHC RBC-ENTMCNC: 26.2 PG — LOW (ref 27–34)
MCHC RBC-ENTMCNC: 31.7 GM/DL — LOW (ref 32–36)
MCV RBC AUTO: 82.7 FL — SIGNIFICANT CHANGE UP (ref 80–100)
PLATELET # BLD AUTO: 150 K/UL — SIGNIFICANT CHANGE UP (ref 150–400)
POTASSIUM SERPL-MCNC: 4.6 MMOL/L — SIGNIFICANT CHANGE UP (ref 3.5–5.3)
POTASSIUM SERPL-SCNC: 4.6 MMOL/L — SIGNIFICANT CHANGE UP (ref 3.5–5.3)
POTASSIUM SERPL-SCNC: 6.3 MMOL/L
PROT SERPL-MCNC: 7.7 G/DL — SIGNIFICANT CHANGE UP (ref 6–8.3)
RBC # BLD: 5.61 M/UL — HIGH (ref 3.8–5.2)
RBC # FLD: 17.3 % — HIGH (ref 10.3–14.5)
SODIUM SERPL-SCNC: 139 MMOL/L
SODIUM SERPL-SCNC: 140 MMOL/L — SIGNIFICANT CHANGE UP (ref 135–145)
WBC # BLD: 12.79 K/UL — HIGH (ref 3.8–10.5)
WBC # FLD AUTO: 12.79 K/UL — HIGH (ref 3.8–10.5)

## 2018-07-05 PROCEDURE — 99233 SBSQ HOSP IP/OBS HIGH 50: CPT | Mod: GC

## 2018-07-05 PROCEDURE — 90935 HEMODIALYSIS ONE EVALUATION: CPT | Mod: GC

## 2018-07-05 PROCEDURE — 99233 SBSQ HOSP IP/OBS HIGH 50: CPT

## 2018-07-05 PROCEDURE — 93010 ELECTROCARDIOGRAM REPORT: CPT

## 2018-07-05 RX ORDER — ASPIRIN/CALCIUM CARB/MAGNESIUM 324 MG
81 TABLET ORAL DAILY
Qty: 0 | Refills: 0 | Status: DISCONTINUED | OUTPATIENT
Start: 2018-07-05 | End: 2018-07-06

## 2018-07-05 RX ORDER — SODIUM CHLORIDE 9 MG/ML
500 INJECTION INTRAMUSCULAR; INTRAVENOUS; SUBCUTANEOUS ONCE
Qty: 0 | Refills: 0 | Status: COMPLETED | OUTPATIENT
Start: 2018-07-05 | End: 2018-07-05

## 2018-07-05 RX ORDER — HYDROXYUREA 500 MG/1
500 CAPSULE ORAL DAILY
Qty: 0 | Refills: 0 | Status: DISCONTINUED | OUTPATIENT
Start: 2018-07-05 | End: 2018-07-06

## 2018-07-05 RX ADMIN — Medication 81 MILLIGRAM(S): at 22:56

## 2018-07-05 RX ADMIN — SODIUM CHLORIDE 1000 MILLILITER(S): 9 INJECTION INTRAMUSCULAR; INTRAVENOUS; SUBCUTANEOUS at 20:24

## 2018-07-05 RX ADMIN — HEPARIN SODIUM 5000 UNIT(S): 5000 INJECTION INTRAVENOUS; SUBCUTANEOUS at 12:16

## 2018-07-05 RX ADMIN — HEPARIN SODIUM 5000 UNIT(S): 5000 INJECTION INTRAVENOUS; SUBCUTANEOUS at 21:12

## 2018-07-05 RX ADMIN — HEPARIN SODIUM 5000 UNIT(S): 5000 INJECTION INTRAVENOUS; SUBCUTANEOUS at 06:03

## 2018-07-05 RX ADMIN — Medication 1334 MILLIGRAM(S): at 08:28

## 2018-07-05 RX ADMIN — HYDROXYUREA 500 MILLIGRAM(S): 500 CAPSULE ORAL at 22:56

## 2018-07-05 RX ADMIN — Medication 1334 MILLIGRAM(S): at 12:15

## 2018-07-05 RX ADMIN — Medication 1334 MILLIGRAM(S): at 18:39

## 2018-07-05 NOTE — CONSULT NOTE ADULT - PROBLEM SELECTOR RECOMMENDATION 4
CT abd showed cirrhosis. Unclear etiology. Given hx of PV w/splenic vein thrombosis, hepatic vein thrombosis cannot be ruled out.   - RUQ doppler pending

## 2018-07-05 NOTE — CONSULT NOTE ADULT - PROBLEM SELECTOR RECOMMENDATION 3
Chronic. Secondary to hx of PV. Chronic from 2015 2/2 splenic vein thrombosis in the setting of PV. Chronic from 2015 2/2 splenic vein thrombosis

## 2018-07-05 NOTE — CONSULT NOTE ADULT - PROBLEM SELECTOR RECOMMENDATION 9
Patient states she has hx of PV and used to be on hydroxyurea and/or aspirin as well as undergo phlebotomy in the past when she followed with a hematologist. Currently off treatment for ~2-3 yrs. Given patient has hx of thromboembolic disease, she should be on hydroxyurea. Also, patient should undergo phlebotomy for a goal hct < 45%.  - needs phlebotomy to keep hct < 45%  - will likely need hydroxyurea given hx splenic infarct and AV fistula thrombosis  - will need outpatient referral to hematology Patient states she has hx of ?PV and used to be on hydroxyurea and/or aspirin as well as undergo phlebotomy in the past when she followed with a hematologist. Currently off treatment for ~2-3 yrs. Given patient has hx of thromboembolic disease, she should be on hydroxyurea.

## 2018-07-05 NOTE — DISCHARGE NOTE ADULT - ADDITIONAL INSTRUCTIONS
please follow up with your PMD/renal in 1 week.  follow up with hepatology (for liver), vascular (for HD fistular) and hematology (for high hemoglobin) in 2 weeks.

## 2018-07-05 NOTE — DISCHARGE NOTE ADULT - CARE PLAN
Principal Discharge DX:	Thrombosis of arteriovenous fistula, subsequent encounter  Goal:	resolved.  Assessment and plan of treatment:	s/p thrombectomy by Dr. Dempsey as outpatient, AV fistula functioning well.  continue with HD on Tue/Thur/Sat.  outpatient vascular followup.  Secondary Diagnosis:	Polycythemia vera  Assessment and plan of treatment:	Hgb 14-16 with ESRD.   Given hx of splenic vein thrombosis/splenic infaract and now with thrombosis of AV fistula in setting of polycythema vera.  Hemo recommended **********************************  follow up with  Secondary Diagnosis:	Hyperkalemia  Assessment and plan of treatment:	resolved after HD.  continue with HD.  Secondary Diagnosis:	Cirrhosis of liver without ascites, unspecified hepatic cirrhosis type  Assessment and plan of treatment:	unknown etiology. elevated LFT, now resolved.  reviewed previous CTA abdomen, mild liver cirrhosis.  please f/u with your hepatolgy as scheduled (at the end of this month as per pt)  LFT improving, no need to further monitoring.  Secondary Diagnosis:	ESRD on dialysis  Assessment and plan of treatment:	continue with  phoslo 1334 three times a day  continue with  lasix 60mg daily.  Hemodialysis is the most common method used to treat advanced and permanent kidney failure. But even with better procedures and equipment, hemodialysis is still a complicated and inconvenient therapy that requires a coordinated effort from your whole health care team, including your nephrologist, dialysis nurse, dialysis technician, dietitian, and . The most important members of your health care team are you and your family.   Healthy kidneys clean your blood by removing excess fluid, minerals, and wastes. When your kidneys fail, harmful wastes build up in your body, your blood pressure may rise, and your body may retain excess fluid and not make enough red blood cells. When this happens, you need treatment to replace the work of your failed kidneys. In hemodialysis, your blood is allowed to flow, a few ounces at a time, through a special filter that removes wastes and extra fluids. The clean blood is then returned to your body. One of the biggest adjustments you must make when you start hemodialysis treatments is following a strict schedule. Most patients go to a dialysis center three times a week for 3 to 5 or more hours each visit.   Some of the more common conditions caused by kidney failure are extreme tiredness, bone problems, joint problems, itching, and "restless legs”.  Many people treated with hemodialysis complain of itchy skin, which is often worse during or just after treatment.  Patients on dialysis often have insomnia, and some people have a specific problem called the sleep apnea syndrome.  Over time, these sleep disturbances can lead to "day-night reversal" (insomnia at night, sleepiness during the day), headache, depression, and decreased alertness.   Sleep disorders may seem unimportant, but they can impair your quality of life. Don't hesitate to raise these problems with your nurse, doctor, or  Principal Discharge DX:	Thrombosis of arteriovenous fistula, subsequent encounter  Goal:	resolved.  Assessment and plan of treatment:	s/p thrombectomy by Dr. Dempsey as outpatient, AV fistula functioning well.  continue with HD on Tue/Thur/Sat.  outpatient vascular followup.  Secondary Diagnosis:	Polycythemia vera  Assessment and plan of treatment:	Hgb 14-16 with ESRD.   Given hx of splenic vein thrombosis/splenic infarct and now with thrombosis of AV fistula in setting of polycythema vera.  Hemo recommended **********************************  follow up with  Secondary Diagnosis:	Hyperkalemia  Assessment and plan of treatment:	resolved after HD.  continue with HD.  Secondary Diagnosis:	Cirrhosis of liver without ascites, unspecified hepatic cirrhosis type  Assessment and plan of treatment:	unknown etiology. elevated LFT, now resolved.  reviewed previous CTA abdomen, mild liver cirrhosis.  please f/u with your hepatolgy as scheduled (at the end of this month as per pt)  LFT improving, no need to further monitoring.  Secondary Diagnosis:	ESRD on dialysis  Assessment and plan of treatment:	continue with  phoslo 1334 three times a day  continue with  lasix 60mg daily.  Hemodialysis is the most common method used to treat advanced and permanent kidney failure. But even with better procedures and equipment, hemodialysis is still a complicated and inconvenient therapy that requires a coordinated effort from your whole health care team, including your nephrologist, dialysis nurse, dialysis technician, dietitian, and . The most important members of your health care team are you and your family.   Healthy kidneys clean your blood by removing excess fluid, minerals, and wastes. When your kidneys fail, harmful wastes build up in your body, your blood pressure may rise, and your body may retain excess fluid and not make enough red blood cells. When this happens, you need treatment to replace the work of your failed kidneys. In hemodialysis, your blood is allowed to flow, a few ounces at a time, through a special filter that removes wastes and extra fluids. The clean blood is then returned to your body. One of the biggest adjustments you must make when you start hemodialysis treatments is following a strict schedule. Most patients go to a dialysis center three times a week for 3 to 5 or more hours each visit.   Some of the more common conditions caused by kidney failure are extreme tiredness, bone problems, joint problems, itching, and "restless legs”.  Many people treated with hemodialysis complain of itchy skin, which is often worse during or just after treatment.  Patients on dialysis often have insomnia, and some people have a specific problem called the sleep apnea syndrome.  Over time, these sleep disturbances can lead to "day-night reversal" (insomnia at night, sleepiness during the day), headache, depression, and decreased alertness.   Sleep disorders may seem unimportant, but they can impair your quality of life. Don't hesitate to raise these problems with your nurse, doctor, or  Principal Discharge DX:	Thrombosis of arteriovenous fistula, subsequent encounter  Goal:	resolved.  Assessment and plan of treatment:	s/p thrombectomy by Dr. Dempsey as outpatient, AV fistula functioning well.  continue with HD on Tue/Thur/Sat.  outpatient vascular followup.  Secondary Diagnosis:	Polycythemia vera  Assessment and plan of treatment:	Hgb 14-16 with ESRD.   Given hx of splenic vein thrombosis/splenic infarct and now with thrombosis of AV fistula in setting of polycythema vera.  Hemo recommended phelbotomy x1, started on aspirin daily and hydrourea 500mg daily only on HD days  follow up with Dr Juarez Hematology follow up at [facility]: 7/23 @ 9AM  Secondary Diagnosis:	Hyperkalemia  Assessment and plan of treatment:	resolved after HD.  continue with HD.  Secondary Diagnosis:	Cirrhosis of liver without ascites, unspecified hepatic cirrhosis type  Assessment and plan of treatment:	unknown etiology. elevated LFT, now resolved.  reviewed previous CTA abdomen, mild liver cirrhosis.  please f/u with your hepatology as scheduled (at the end of this month as per pt)  LFT improving, no need to further monitoring.  Secondary Diagnosis:	ESRD on dialysis  Assessment and plan of treatment:	continue with  phoslo 1334 three times a day  continue with  lasix 60mg daily.  Hemodialysis is the most common method used to treat advanced and permanent kidney failure. But even with better procedures and equipment, hemodialysis is still a complicated and inconvenient therapy that requires a coordinated effort from your whole health care team, including your nephrologist, dialysis nurse, dialysis technician, dietitian, and . The most important members of your health care team are you and your family.   Healthy kidneys clean your blood by removing excess fluid, minerals, and wastes. When your kidneys fail, harmful wastes build up in your body, your blood pressure may rise, and your body may retain excess fluid and not make enough red blood cells. When this happens, you need treatment to replace the work of your failed kidneys. In hemodialysis, your blood is allowed to flow, a few ounces at a time, through a special filter that removes wastes and extra fluids. The clean blood is then returned to your body. One of the biggest adjustments you must make when you start hemodialysis treatments is following a strict schedule. Most patients go to a dialysis center three times a week for 3 to 5 or more hours each visit.   Some of the more common conditions caused by kidney failure are extreme tiredness, bone problems, joint problems, itching, and "restless legs”.  Many people treated with hemodialysis complain of itchy skin, which is often worse during or just after treatment.  Patients on dialysis often have insomnia, and some people have a specific problem called the sleep apnea syndrome.  Over time, these sleep disturbances can lead to "day-night reversal" (insomnia at night, sleepiness during the day), headache, depression, and decreased alertness.   Sleep disorders may seem unimportant, but they can impair your quality of life. Don't hesitate to raise these problems with your nurse, doctor, or   Secondary Diagnosis:	HTN (hypertension)  Assessment and plan of treatment:	continue amlodipine on non HD days

## 2018-07-05 NOTE — DISCHARGE NOTE ADULT - SECONDARY DIAGNOSIS.
Polycythemia vera Hyperkalemia Cirrhosis of liver without ascites, unspecified hepatic cirrhosis type ESRD on dialysis HTN (hypertension)

## 2018-07-05 NOTE — CONSULT NOTE ADULT - ATTENDING COMMENTS
Patient is admitted after re vascularisation of AV access for emergent dialysis. Hyperkalemia.  Plan: hemodialysis  F/u K  Team will follow.
The patient is seen while receiving dialysis. The patient is thought to have a myeloproliferative syndrome; today WBC and platelet count are norrmal. Her HGB is mildly elevated for a female at 16; she has no thrombotic complication. She may have essential erythrocytosis. We will review prior data for evidence of J A K 2 mutation. Follow u in outpatient clinic with Dr RAYA Stewart

## 2018-07-05 NOTE — CONSULT NOTE ADULT - PROBLEM SELECTOR RECOMMENDATION 2
Now s/p ?thrombectomy on Tuesday. AV fistula with good thrill, patient states she underwent HD on Tuesday after her procedure Now s/p thrombectomy. AV fistula working well. Patient due to HD today

## 2018-07-05 NOTE — DISCHARGE NOTE ADULT - CARE PROVIDERS DIRECT ADDRESSES
,[email or web address]

## 2018-07-05 NOTE — PROGRESS NOTE ADULT - ASSESSMENT
57yoF with ESRD on HDTTS, sent from HD as unable to get HD due to clotted access. Access declotted by Vascular Surgery. Pt with hyperK on admission, underwent urgent HD.

## 2018-07-05 NOTE — DISCHARGE NOTE ADULT - HOSPITAL COURSE
57F [place] 13 [place] w/ hx of GN w/ ESRD on HD (T/Th/Sa), hx of pancreatic cyst, polycythemia vera c/b splenomegaly and splenic vein thrombosis w/ infarct, recently found with likely cirrhosis on CT w/ grade 1 gastric varices of unclear etiology (has had extensive workup already including for hep B/C as well as for cash's/hemochromatosis/autoimmune hepatitis), Former smoker (10year,1/3ppd, quit 2015), PUD/GERD on H1 blockade, now presenting with hyperkalemia and s/p thrombectomy for AV fistula thrombosis. hyperkalemia resolved after HD. c/w HD T/T/S, renally dose medication  c/w phoslo 1334 tid.c/w lasix 60mg daily.  Polycythemia vera, hg > 14 w/ HD. Given hx of splenic vein thrombosis/splenic infarct and now with thrombosis of AV fistula in setting of polycythema vera.  pt pt didnt f/u with hemo (Dr. funez) for 2 ~ 3yrs, was on hydrea previously and now stopped.  evaluated by hemo****************************recomm f/u with onc if phlebotomy, hydrea, or aspirin should be started as inpatient vs outpatient.   elevated LFT w/ Hx of mild liver cirrhosis. now resolved. will f/u her hepatology. 57F [place] 13 [place]) w/ hx of GN w/ ESRD on HD (T/Th/Sa), hx of pancreatic cyst, polycythemia vera c/b splenomegaly and splenic vein thrombosis w/ infarct, recently found with likely cirrhosis on CT w/ grade 1 gastric varices of unclear etiology (has had extensive workup already including for hep B/C as well as for cash's/hemochromatosis/autoimmune hepatitis), Former smoker (10year,1/3ppd, quit 2015), PUD/GERD on H1 blockade, now presenting with hyperkalemia and s/p thrombectomy for AV fistula thrombosis     Problem/Plan - 1:  ·  Problem: Thrombosis of arteriovenous fistula, subsequent encounter.  Plan: s/p thrombectomy by Dr. Dempsey as outpatient, AV fistula functioning well  Renal following, c/w HD  outpatient vascular followup.      Problem/Plan - 2:  ·  Problem: Hyperkalemia.  Plan: Resolved s/p dialysis.   c/w HD.      Problem/Plan - 3:  ·  Problem: Polycythemia vera.  Plan: Hgb 14-16 with ESRD. 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 Chester who she has not seen in 2-3 years and previously was on Hydrea but restarted again.  Dc home on Aspirin and Hydrea. Outpt Hematology follow up.         Problem/Plan - 4:  ·  Problem: Cirrhosis of liver without ascites, unspecified hepatic cirrhosis type.  Plan: Pt with cirrhosis of unclear etiology, scheduled to see hepatology at the end of this month.  LFT improving, no need to further monitoring.      Problem/Plan - 5:  ·  Problem: ESRD on dialysis.  Plan: c/w HD T/T/S  renally dose medication  c/w phoslo 1334 tid  c/w lasix 60mg daily.     Problem/Plan - 6:  Problem: HTN (hypertension). Plan: stable  c/w amlodipine 5mg daily on non HD days.    Pt stable discharged home with outpatient follow up Nephrologist, Hematologist and Vascular Surgeon. 57F [place] ([place] 13 [place]) w/ hx of GN w/ ESRD on HD (T/Th/Sa), hx of pancreatic cyst, polycythemia vera c/b splenomegaly and splenic vein thrombosis w/ infarct, recently found with likely cirrhosis on CT w/ grade 1 gastric varices of unclear etiology (has had extensive workup already including for hep B/C as well as for cash's/hemochromatosis/autoimmune hepatitis), Former smoker (10year,1/3ppd, quit 2015), PUD/GERD on H1 blockade, now presenting with hyperkalemia and s/p thrombectomy for AV fistula thrombosis     ·  Problem: Thrombosis of arteriovenous fistula, subsequent encounter.  Plan: s/p thrombectomy by Dr. Dempsey as outpatient, AV fistula functioning well  Renal following, c/w HD  outpatient vascular followup.      Problem/Plan - 2:  ·  Problem: Hyperkalemia.  Plan: Resolved s/p dialysis.   c/w HD.      Problem/Plan - 3:  ·  Problem: Polycythemia vera.  Plan: Hgb 14-16 with ESRD. Given hx of splenic vein thrombosis/splenic infarct and now with thrombosis of AV fistula in setting of polycythema vera, consulted and discussed with heme onc, likely needs treatment of her polycythemia   -heme consulted, discussed with fellow, rec phlebotomy today, then 500cc bolus then can be discharge home with outpatient heme f/u  -started on aspirin 81mg daily  -started on hydroxyurea 500mg daily post HD on HD days only.      Problem/Plan - 4:  ·  Problem: Sinus tachycardia.  Plan: resolved, had episode of sinus tachycardia post HD yesterday likely due to fluid shifts due to HD, improved with 500cc/bolus  -EKG with no acute ischemic changes  -asymptomatic today with normal HR on tele.      Problem/Plan - 5:  ·  Problem: Cirrhosis of liver without ascites, unspecified hepatic cirrhosis type.  Plan: cirrhosis of unclear etiology, scheduled to see hepatology at the end of this month.  -LFT improving, no need to further monitoring.      Problem/Plan - 6:  Problem: ESRD on dialysis. Plan: c/w HD T/T/S  renally dose medication  c/w phoslo 1334 tid  c/w lasix 60mg daily.     Problem/Plan - 7:  ·  Problem: HTN (hypertension).  Plan: stable  c/w amlodipine 5mg daily on non HD days. 57F [place] 13 [place]) w/ hx of GN w/ ESRD on HD (T/Th/Sa), hx of pancreatic cyst, polycythemia vera c/b splenomegaly and splenic vein thrombosis w/ infarct, recently found with likely cirrhosis on CT w/ grade 1 gastric varices of unclear etiology (has had extensive workup already including for hep B/C as well as for cash's/hemochromatosis/autoimmune hepatitis), Former smoker (10year,1/3ppd, quit 2015), PUD/GERD on H1 blockade, now presenting with hyperkalemia and s/p thrombectomy for AV fistula thrombosis      Hospital Course: Patient had Thrombosis of arteriovenous fistula s/p thrombectomy by Dr. Dempsey, AV fistula functioning well. Blood work significant for hyperkalemia which improved with HD. Given polycythema vera, and Hgb 14-16 with ESRD with splenic vein thrombosis/splenic infarct and now with thrombosis of AV fistula in setting of polycythema vera, heme was consulted and   rec phlebotomy x 1 then 500cc bolus which patient refused due to not wanting a new IV. Orthostatics were checked and negative, patient asymptomatic. Also started on aspirin and hydroxyurea 500mg daily post HD on HD days only. Had one episode of Sinus tachycardia post HD yesterday likely due to fluid shifts due to HD, improved with 500cc/bolus. EKG with no acute ischemic changes, asymptomatic and no further events on tele.  Cirrhosis of liver without ascites unclear etiology, scheduled to see hepatology at the end of this month. Elevated LFT on admission improved no need to further monitoring. For ESRD on dialysis c/w HD T/T/S. For HTN c/w amlodipine 5mg daily on non HD days. Patient HD stable without any complaints asking to be discharged, can followup with renal, heme, and vascular surgery as outpatient.     Discharge Diagnosis:  Thrombosis of arteriovenous fistula  Hyperkalemia  Polycythemia Vera  ESRD on HD  HTN  Liver Cirrhosis  Sinus tachycardia

## 2018-07-05 NOTE — CONSULT NOTE ADULT - SUBJECTIVE AND OBJECTIVE BOX
HPI:  Patient is a 57F year old female with PMHx of GN w/ ESRD on HD (T/Th/Sa), hx of pancreatic cyst, polycythemia vera complicated by splenomegaly and splenic vein thrombosis w/ infarct, recently found with likely cirrhosis on CT w/ grade 1 gastric varices who presented to with hyperkalemia and s/p thrombectomy for AV fistula thrombosis. Hematology was consulted for recommendations. Patient states she has hx of PV and used to be on hydroxyurea and/or aspirin as well as undergo phlebotomy in the past when she followed with a hematologist. Per patient, her hematologist "left" approximately 2-3 years ago and she has not followed with one since then. Patient reports that her PV is now followed by her nephrologist who took her off the aspirin/hydroxyurea and stopped the phlebotomy. It is the patient's understanding that the HD is also treating her polycythemia.    Today, patient reports that she had a "blood clot" on her AV fistula which was removed on Tuesday. Patient states that she underwent HD that same day and the AV fistula was working appropriately. Abd US from admission showed chronic splenic vein thrombosis Labs from this morning remarkable for  hgb 14.7 and hct of 46.4. She was noted to have elevated coags.     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  Splenomegaly: 2015  Hypertension  Peptic ulcer disease  Polycythemia vera  AV fistula: Placed 9/18  Hemoperitoneum as complication of peritoneal dialysis: s/p removal 9/18  Peritoneal dialysis catheter in place: Placed 8/9/2017      FAMILY HISTORY:  Family history of malignant neoplasm (Grandparent): head and neck in father  Family history of hypertension in mother      SOCIAL HISTORY: No EtOH, no tobacco    REVIEW OF SYSTEMS:    CONSTITUTIONAL: No weakness, fevers or chills  EYES/ENT: No visual changes;  No vertigo or throat pain   NECK: No pain or stiffness  RESPIRATORY: No cough, wheezing, hemoptysis; No shortness of breath  CARDIOVASCULAR: No chest pain or palpitations  GASTROINTESTINAL: No abdominal or epigastric pain. No nausea, vomiting, or hematemesis; No diarrhea or constipation. No melena or hematochezia.  GENITOURINARY: No dysuria, frequency or hematuria  NEUROLOGICAL: No numbness or weakness  SKIN: No itching, burning, rashes, or lesions   All other review of systems is negative unless indicated above.        T(F): 98 (07-05-18 @ 04:16), Max: 98.1 (07-04-18 @ 20:17)  HR: 70 (07-05-18 @ 04:16)  BP: 115/72 (07-05-18 @ 04:16)  RR: 18 (07-05-18 @ 04:16)  SpO2: 94% (07-05-18 @ 04:16)  Wt(kg): --    GENERAL: NAD, sitting comfortably in bed   HEAD:  Atraumatic, Normocephalic  EYES: EOMI, conjunctiva and sclera clear  NECK: Supple  CHEST/LUNG: Clear to auscultation bilaterally; No wheeze  HEART: Regular rate and rhythm; No murmurs, rubs, or gallops  ABDOMEN: Soft, Nontender, Nondistended; Bowel sounds present  EXTREMITIES:  no cyanosis, no petechiae   SKIN: No rashes or lesions                          14.7   12.79 )-----------( 150      ( 05 Jul 2018 07:55 )             46.4       07-05    140  |  94<L>  |  27<H>  ----------------------------<  97  4.6   |  24  |  6.41<H>    Ca    8.4      05 Jul 2018 06:31  Phos  4.5     07-04  Mg     2.0     07-04    TPro  7.7  /  Alb  3.9  /  TBili  1.7<H>  /  DBili  x   /  AST  14  /  ALT  6<L>  /  AlkPhos  153<H>  07-05 HPI:  Patient is a 57F year old female with PMHx of GN w/ ESRD on HD (T/Th/Sa), hx of pancreatic cyst, polycythemia vera complicated by splenomegaly and splenic vein thrombosis w/ infarct, recently found with likely cirrhosis on CT w/ grade 1 gastric varices who presented to with hyperkalemia and s/p thrombectomy for AV fistula thrombosis. Hematology was consulted for recommendations. Patient states she has hx of PV and used to be on hydroxyurea and/or aspirin as well as undergo phlebotomy in the past when she followed with a hematologist. Per patient, her hematologist "left" approximately 2-3 years ago and she has not followed with one since then. Patient reports that her PV is now followed by her nephrologist who took her off the aspirin/hydroxyurea. It is the patient's understanding that the HD is also treating her polycythemia.    Today, patient reports that she had a "blood clot" on her AV fistula which was removed on Tuesday. Patient states that she underwent HD that same day and the AV fistula was working appropriately. Recent Abd US showed chronic splenic vein thrombosis. Labs from this morning remarkable for  hgb 14.7 and hct of 46.4. She was noted to have elevated coags.     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  Splenomegaly: 2015  Hypertension  Peptic ulcer disease  Polycythemia vera  AV fistula: Placed 9/18  Hemoperitoneum as complication of peritoneal dialysis: s/p removal 9/18  Peritoneal dialysis catheter in place: Placed 8/9/2017      FAMILY HISTORY:  Family history of malignant neoplasm (Grandparent): head and neck in father  Family history of hypertension in mother      SOCIAL HISTORY: No EtOH, no tobacco    REVIEW OF SYSTEMS:    CONSTITUTIONAL: No weakness, fevers or chills  EYES/ENT: No visual changes;  No vertigo or throat pain   NECK: No pain or stiffness  RESPIRATORY: No cough, wheezing, hemoptysis; No shortness of breath  CARDIOVASCULAR: No chest pain or palpitations  GASTROINTESTINAL: No abdominal or epigastric pain. No nausea, vomiting, or hematemesis; No diarrhea or constipation. No melena or hematochezia.  GENITOURINARY: No dysuria, frequency or hematuria  NEUROLOGICAL: No numbness or weakness  SKIN: No itching, burning, rashes, or lesions   All other review of systems is negative unless indicated above.        T(F): 98 (07-05-18 @ 04:16), Max: 98.1 (07-04-18 @ 20:17)  HR: 70 (07-05-18 @ 04:16)  BP: 115/72 (07-05-18 @ 04:16)  RR: 18 (07-05-18 @ 04:16)  SpO2: 94% (07-05-18 @ 04:16)  Wt(kg): --    GENERAL: NAD, sitting comfortably in bed   HEAD:  Atraumatic, Normocephalic  EYES: EOMI, conjunctiva and sclera clear  NECK: Supple  CHEST/LUNG: Clear to auscultation bilaterally; No wheeze  HEART: Regular rate and rhythm; No murmurs, rubs, or gallops  ABDOMEN: Soft, Nontender, Nondistended; Bowel sounds present  EXTREMITIES:  no cyanosis, no petechiae   SKIN: No rashes or lesions                          14.7   12.79 )-----------( 150      ( 05 Jul 2018 07:55 )             46.4       07-05    140  |  94<L>  |  27<H>  ----------------------------<  97  4.6   |  24  |  6.41<H>    Ca    8.4      05 Jul 2018 06:31  Phos  4.5     07-04  Mg     2.0     07-04    TPro  7.7  /  Alb  3.9  /  TBili  1.7<H>  /  DBili  x   /  AST  14  /  ALT  6<L>  /  AlkPhos  153<H>  07-05 HPI:  Patient is a 57F year old female with PMHx of GN w/ ESRD on HD (T/Th/Sa), hx of pancreatic cyst, polycythemia complicated by splenomegaly and splenic vein thrombosis w/ infarct, recently found with likely cirrhosis on CT w/ grade 1 gastric varices who presented to with hyperkalemia and s/p thrombectomy for AV fistula thrombosis. Hematology was consulted for recommendations. Patient states she has hx of PV and used to be on hydroxyurea and/or aspirin as well as undergo phlebotomy in the past when she followed with a hematologist. Per patient, her hematologist "left" approximately 2-3 years ago and she has not followed with one since then. Patient reports that her PV is now followed by her nephrologist who took her off the aspirin/hydroxyurea. It is the patient's understanding that the HD is also treating her polycythemia.    Today, patient reports that she had a "blood clot" on her AV fistula which was removed on Tuesday. Patient states that she underwent HD that same day and the AV fistula was working appropriately. Recent Abd US showed chronic splenic vein thrombosis. Labs from this morning remarkable for  hgb 14.7 and hct of 46.4. She was noted to have elevated coags.     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  Splenomegaly: 2015  Hypertension  Peptic ulcer disease  Polycythemia vera  AV fistula: Placed 9/18  Hemoperitoneum as complication of peritoneal dialysis: s/p removal 9/18  Peritoneal dialysis catheter in place: Placed 8/9/2017      FAMILY HISTORY:  Family history of malignant neoplasm (Grandparent): head and neck in father  Family history of hypertension in mother      SOCIAL HISTORY: No EtOH, no tobacco    REVIEW OF SYSTEMS:    CONSTITUTIONAL: No weakness, fevers or chills  EYES/ENT: No visual changes;  No vertigo or throat pain   NECK: No pain or stiffness  RESPIRATORY: No cough, wheezing, hemoptysis; No shortness of breath  CARDIOVASCULAR: No chest pain or palpitations  GASTROINTESTINAL: No abdominal or epigastric pain. No nausea, vomiting, or hematemesis; No diarrhea or constipation. No melena or hematochezia.  GENITOURINARY: No dysuria, frequency or hematuria  NEUROLOGICAL: No numbness or weakness  SKIN: No itching, burning, rashes, or lesions   All other review of systems is negative unless indicated above.        T(F): 98 (07-05-18 @ 04:16), Max: 98.1 (07-04-18 @ 20:17)  HR: 70 (07-05-18 @ 04:16)  BP: 115/72 (07-05-18 @ 04:16)  RR: 18 (07-05-18 @ 04:16)  SpO2: 94% (07-05-18 @ 04:16)  Wt(kg): --    GENERAL: NAD, sitting comfortably in bed   HEAD:  Atraumatic, Normocephalic  EYES: EOMI, conjunctiva and sclera clear  NECK: Supple  CHEST/LUNG: Clear to auscultation bilaterally; No wheeze  HEART: Regular rate and rhythm; No murmurs, rubs, or gallops  ABDOMEN: Soft, Nontender, Nondistended; Bowel sounds present  EXTREMITIES:  no cyanosis, no petechiae   SKIN: No rashes or lesions                          14.7   12.79 )-----------( 150      ( 05 Jul 2018 07:55 )             46.4       07-05    140  |  94<L>  |  27<H>  ----------------------------<  97  4.6   |  24  |  6.41<H>    Ca    8.4      05 Jul 2018 06:31  Phos  4.5     07-04  Mg     2.0     07-04    TPro  7.7  /  Alb  3.9  /  TBili  1.7<H>  /  DBili  x   /  AST  14  /  ALT  6<L>  /  AlkPhos  153<H>  07-05

## 2018-07-05 NOTE — PROGRESS NOTE ADULT - PROBLEM SELECTOR PLAN 5
c/w HD T/T/S  renally dose medication  c/w phoslo 1334 tid c/w HD T/T/S  renally dose medication  c/w phoslo 1334 tid  c/w lasix 60mg daily

## 2018-07-05 NOTE — DISCHARGE NOTE ADULT - MEDICATION SUMMARY - MEDICATIONS TO TAKE
I will START or STAY ON the medications listed below when I get home from the hospital:    aspirin 81 mg oral tablet, chewable  -- 1 tab(s) by mouth once a day  -- Indication: For Polycythemia vera    hydroxyurea 500 mg oral capsule  -- 1 cap(s) by mouth Tuesday, Thursday, Saturday . Please take it on your Dialysis days only, after getting your dialysis treatment.   -- Indication: For Polycythemia vera    amLODIPine 5 mg oral tablet  -- 1 tab(s) by mouth on non HD days  -- Indication: For Blood Pressure    furosemide 20 mg oral tablet  -- 3 tab(s) by mouth once a day  -- Indication: For Diuretic    famotidine 20 mg oral tablet  -- 1 tab(s) by mouth 2 times a day  -- Indication: For Reflux    calcium acetate 667 mg oral capsule  -- 2 tab(s) by mouth 3 times a day  -- Indication: For ESRD on dialysis

## 2018-07-05 NOTE — DISCHARGE NOTE ADULT - PROVIDER TOKENS
TOKEN:'[id number]:'[id number]:'[id number]' TOKEN:'[id number]:'[id number]:'[id number]'

## 2018-07-05 NOTE — DISCHARGE NOTE ADULT - PLAN OF CARE
resolved. s/p thrombectomy by Dr. Dempsey as outpatient, AV fistula functioning well.  continue with HD on Tue/Thur/Sat.  outpatient vascular followup. Hgb 14-16 with ESRD.   Given hx of splenic vein thrombosis/splenic infaract and now with thrombosis of AV fistula in setting of polycythema vera.  Hemo recommended **********************************  follow up with resolved after HD.  continue with HD. unknown etiology. elevated LFT, now resolved.  reviewed previous CTA abdomen, mild liver cirrhosis.  please f/u with your hepatolgy as scheduled (at the end of this month as per pt)  LFT improving, no need to further monitoring. continue with  phoslo 1334 three times a day  continue with  lasix 60mg daily.  Hemodialysis is the most common method used to treat advanced and permanent kidney failure. But even with better procedures and equipment, hemodialysis is still a complicated and inconvenient therapy that requires a coordinated effort from your whole health care team, including your nephrologist, dialysis nurse, dialysis technician, dietitian, and . The most important members of your health care team are you and your family.   Healthy kidneys clean your blood by removing excess fluid, minerals, and wastes. When your kidneys fail, harmful wastes build up in your body, your blood pressure may rise, and your body may retain excess fluid and not make enough red blood cells. When this happens, you need treatment to replace the work of your failed kidneys. In hemodialysis, your blood is allowed to flow, a few ounces at a time, through a special filter that removes wastes and extra fluids. The clean blood is then returned to your body. One of the biggest adjustments you must make when you start hemodialysis treatments is following a strict schedule. Most patients go to a dialysis center three times a week for 3 to 5 or more hours each visit.   Some of the more common conditions caused by kidney failure are extreme tiredness, bone problems, joint problems, itching, and "restless legs”.  Many people treated with hemodialysis complain of itchy skin, which is often worse during or just after treatment.  Patients on dialysis often have insomnia, and some people have a specific problem called the sleep apnea syndrome.  Over time, these sleep disturbances can lead to "day-night reversal" (insomnia at night, sleepiness during the day), headache, depression, and decreased alertness.   Sleep disorders may seem unimportant, but they can impair your quality of life. Don't hesitate to raise these problems with your nurse, doctor, or  Hgb 14-16 with ESRD.   Given hx of splenic vein thrombosis/splenic infarct and now with thrombosis of AV fistula in setting of polycythema vera.  Hemo recommended **********************************  follow up with Hgb 14-16 with ESRD.   Given hx of splenic vein thrombosis/splenic infarct and now with thrombosis of AV fistula in setting of polycythema vera.  Hemo recommended phelbotomy x1, started on aspirin daily and hydrourea 500mg daily only on HD days  follow up with Dr Juarez Hematology follow up at [facility]: 7/23 @ 9AM unknown etiology. elevated LFT, now resolved.  reviewed previous CTA abdomen, mild liver cirrhosis.  please f/u with your hepatology as scheduled (at the end of this month as per pt)  LFT improving, no need to further monitoring. continue amlodipine on non HD days

## 2018-07-05 NOTE — DISCHARGE NOTE ADULT - CARE PROVIDER_API CALL
Azucena De León), Internal Medicine; Nephrology  [address] [place] [address]  Phone: [phone]  Fax: [phone]    Jermain Chester), Hematology; Medical Oncology  [address] [facility]  [address]  [place] [address]  Phone: [phone]  Fax: [phone]    Giorgio Dempsey), Vascular Surgery  [address] [facility]  [address]  [place] [address]  Phone: [phone]  Fax: [phone] Azucena De León), Internal Medicine; Nephrology  [address] [place] [address]  Phone: [phone]  Fax: [phone]    Giorgio Dempsey), Vascular Surgery  [address] [facility]  [address]  [place] [address]  Phone: [phone]  Fax: [phone]    Gunnar Juarez), [facility]ematology; [facility]; Internal Medicine; Medical Oncology  [address] [place] [address]  Phone: [phone]  Fax: [phone]

## 2018-07-05 NOTE — CONSULT NOTE ADULT - ASSESSMENT
57F year old female with PMHx of GN w/ ESRD on HD (T/Th/Sa), hx of pancreatic cyst, polycythemia vera complicated by splenomegaly and splenic vein thrombosis w/ infarct, recently found with likely cirrhosis on CT w/ grade 1 gastric varices who presented to with hyperkalemia and s/p thrombectomy for AV fistula thrombosis. Hematology was consulted for recommendations.       Today, patient reports that she had a "blood clot" on her AV fistula which was removed on Tuesday. Patient states that she underwent HD that same day and the AV fistula was working appropriately. Abd US from admission showed chronic splenic vein thrombosis Labs from this morning remarkable for  hgb 14.7 and hct of 46.4. She was noted to have elevated coags. 57F year old female with PMHx of GN w/ ESRD on HD (T/Th/Sa), hx of pancreatic cyst, polycythemia vera complicated by splenomegaly and splenic vein thrombosis w/ infarct, recently found with likely cirrhosis on CT w/ grade 1 gastric varices who presented to with hyperkalemia and s/p thrombectomy for AV fistula thrombosis. Hematology was consulted for recommendations. 57F year old female with PMHx of GN w/ ESRD on HD (T/Th/Sa), hx of pancreatic cyst, polycythemia complicated by splenomegaly and splenic vein thrombosis w/ infarct, recently found with likely cirrhosis on CT w/ grade 1 gastric varices who presented to with hyperkalemia and s/p thrombectomy for AV fistula thrombosis. Hematology was consulted for recommendations. 57F year old female with PMHx of GN w/ ESRD on HD (T/Th/Sa), hx of pancreatic cyst, polycythemia complicated by splenomegaly and splenic vein thrombosis w/ infarct, recently found with likely cirrhosis on CT w/ grade 1 gastric varices who presented to with hyperkalemia and s/p thrombectomy for AV fistula thrombosis. Hematology was consulted for recommendations.     # Hx Polycythemia  Patient endorses hx of PV, however, we don't have records such as Jak2 mutation to confirm diagnosis. Her labs are not consistent with PV given only minimal elevation of WBC and normal plts. Due to this, recommendations are against hydrea at the time  - plan to obtain collateral info from hematologist to confirm diagnosis of PV  - if confirmed diagnosis, patient should be started on aspirin   - case discussed with attng, no need for phlebotomy at the time    # Thrombosis of AV fistula s/p thrombectomy  Patient underwent thrombectomy, currently has working AV fistula. Undergoing HD today    # Splenic infarct   Chronic from 2015 2/2 splenic vein thrombosis 57F year old female with PMHx of GN w/ ESRD on HD (T/Th/Sa), hx of pancreatic cyst, polycythemia vera confirmed with Jak2 mutation complicated by splenomegaly and splenic vein thrombosis w/ infarct, recently found with likely cirrhosis on CT w/ grade 1 gastric varices who presented to with hyperkalemia and s/p thrombectomy for AV fistula thrombosis. Hematology was consulted for recommendations.     # Hx Polycythemia vera  Patient endorses hx of PV, confirmed with Juan José 2 mutation.     - start hydrea 500mg after HD on HD days   - given hx of thromboembolic disease, patient likely needs aspirin and systemic AC  - case discussed with attng, no need for phlebotomy at the time    # Thrombosis of AV fistula s/p thrombectomy  Patient underwent thrombectomy, currently has working AV fistula. Undergoing HD today    # Splenic infarct   Chronic from 2015 2/2 splenic vein thrombosis in the setting of PV 57F year old female with PMHx of GN w/ ESRD on HD (T/Th/Sa), hx of pancreatic cyst, polycythemia vera confirmed with Jak2 mutation complicated by splenomegaly and splenic vein thrombosis w/ infarct, recently found with likely cirrhosis on CT w/ grade 1 gastric varices who presented to with hyperkalemia and s/p thrombectomy for AV fistula thrombosis. Hematology was consulted for recommendations.     # Hx Polycythemia vera  Patient endorses hx of PV, confirmed with Juan José 2 mutation.     - will consider starting hydrea 500mg after HD on HD days   - start aspirin 81mg daily   - case discussed with attng, no need for phlebotomy at the time  - will need hematology referral at discharge     # Thrombosis of AV fistula s/p thrombectomy  Patient underwent thrombectomy, currently has working AV fistula. Undergoing HD today    # Splenic infarct   Chronic from 2015 2/2 splenic vein thrombosis in the setting of PV

## 2018-07-05 NOTE — CHART NOTE - NSCHARTNOTEFT_GEN_A_CORE
Informed by RN pt ST 130s sustaining on tele with complains of tightness in her throat and nausea. Pt seen at bedside, nausea resolved, and confirms palpitations, but denies any wheezing, SOB, CP. Pt was just received post HD noted with HR 110s on last vital signs at dialysis.     Vital Signs Last 24 Hrs  T(C): 36.8 (05 Jul 2018 21:11), Max: 37.1 (05 Jul 2018 14:13)  T(F): 98.2 (05 Jul 2018 21:11), Max: 98.7 (05 Jul 2018 14:13)  HR: 104 (05 Jul 2018 21:11) (70 - 120)  BP: 118/76 (05 Jul 2018 21:11) (112/69 - 154/84)  BP(mean): --  RR: 18 (05 Jul 2018 21:11) (18 - 18)  SpO2: 94% (05 Jul 2018 21:11) (94% - 97%)    1.4 L removed at dialysis    A/P: 57 F w/ PMhx of  ESRD on HD (T/Th/Sa), hx of pancreatic cyst, polycythemia vera c/b splenomegaly and splenic vein thrombosis w/ infarct, recently found with likely cirrhosis on CT w/ grade 1 gastric varices of unclear etiology (has had extensive workup already including for hep B/C as well as for cash's/hemochromatosis/autoimmune hepatitis), Former smoker (10year,1/3ppd, quit 2015), PUD/GERD on H1 blockade, now presenting with hyperkalemia and s/p thrombectomy for AV fistula thrombosis.  Pt now symptomatic with HR 130s max ST s/p HD, likely too much fluid removed.   - 500 cc bolus given   post HR 90s pt verbalizes feeling better    Diane Lane NP  [id number] Informed by RN pt ST 130s sustaining on tele with complains of tightness in her throat and nausea. Pt seen at bedside, nausea resolved, and confirms palpitations, but denies any wheezing, SOB, CP. Pt was just received post HD noted with HR 110s on last vital signs at dialysis.     Vital Signs Last 24 Hrs  T(C): 36.8 (05 Jul 2018 21:11), Max: 37.1 (05 Jul 2018 14:13)  T(F): 98.2 (05 Jul 2018 21:11), Max: 98.7 (05 Jul 2018 14:13)  HR: 104 (05 Jul 2018 21:11) (70 - 120)  BP: 118/76 (05 Jul 2018 21:11) (112/69 - 154/84)  BP(mean): --  RR: 18 (05 Jul 2018 21:11) (18 - 18)  SpO2: 94% (05 Jul 2018 21:11) (94% - 97%)    1.4 L removed at dialysis    Comprehensive Metabolic Panel in AM (07.05.18 @ 06:31)    Sodium, Serum: 140 mmol/L    Potassium, Serum: 4.6 mmol/L    Chloride, Serum: 94 mmol/L    Carbon Dioxide, Serum: 24 mmol/L    Anion Gap, Serum: 22 mmol/L    Blood Urea Nitrogen, Serum: 27 mg/dL    Creatinine, Serum: 6.41 mg/dL    Glucose, Serum: 97 mg/dL    Calcium, Total Serum: 8.4 mg/dL    Protein Total, Serum: 7.7 g/dL    Albumin, Serum: 3.9 g/dL    Bilirubin Total, Serum: 1.7 mg/dL    Alkaline Phosphatase, Serum: 153 U/L    Aspartate Aminotransferase (AST/SGOT): 14 U/L    Alanine Aminotransferase (ALT/SGPT): 6 U/L    PE : A x Ox 3  Skin warm and dry  CV S1 S2 regular tachycardiac  Pulm CTA b/l no wheezing, crackles  Vascular : + bruit/thrill to R arm AVF + pulses to all extremities  no edema    A/P: 57 F w/ PMhx of  ESRD on HD (T/Th/Sa), hx of pancreatic cyst, polycythemia vera c/b splenomegaly and splenic vein thrombosis w/ infarct, recently found with likely cirrhosis on CT w/ grade 1 gastric varices of unclear etiology (has had extensive workup already including for hep B/C as well as for cash's/hemochromatosis/autoimmune hepatitis), Former smoker (10year,1/3ppd, quit 2015), PUD/GERD on H1 blockade, now presenting with hyperkalemia and s/p thrombectomy for AV fistula thrombosis.  Pt now symptomatic with HR 130s max ST s/p HD, likely too much fluid removed.   - 500 cc bolus given   post HR 90s pt verbalizes feeling better    Diane Lane, JONEL  [id number]

## 2018-07-05 NOTE — DISCHARGE NOTE ADULT - PATIENT PORTAL LINK FT
You can access the [facility] Patient Portal, offered by [facility], by registering with the following website: [email or web address]

## 2018-07-06 ENCOUNTER — APPOINTMENT (OUTPATIENT)
Dept: INTERNAL MEDICINE | Facility: CLINIC | Age: 58
End: 2018-07-06

## 2018-07-06 VITALS
DIASTOLIC BLOOD PRESSURE: 75 MMHG | SYSTOLIC BLOOD PRESSURE: 107 MMHG | TEMPERATURE: 98 F | OXYGEN SATURATION: 96 % | HEART RATE: 97 BPM | RESPIRATION RATE: 18 BRPM

## 2018-07-06 DIAGNOSIS — R00.0 TACHYCARDIA, UNSPECIFIED: ICD-10-CM

## 2018-07-06 PROCEDURE — 85027 COMPLETE CBC AUTOMATED: CPT

## 2018-07-06 PROCEDURE — 85014 HEMATOCRIT: CPT

## 2018-07-06 PROCEDURE — 99195 PHLEBOTOMY: CPT

## 2018-07-06 PROCEDURE — 82330 ASSAY OF CALCIUM: CPT

## 2018-07-06 PROCEDURE — 85610 PROTHROMBIN TIME: CPT

## 2018-07-06 PROCEDURE — 71045 X-RAY EXAM CHEST 1 VIEW: CPT

## 2018-07-06 PROCEDURE — 80053 COMPREHEN METABOLIC PANEL: CPT

## 2018-07-06 PROCEDURE — 94640 AIRWAY INHALATION TREATMENT: CPT

## 2018-07-06 PROCEDURE — 93005 ELECTROCARDIOGRAM TRACING: CPT

## 2018-07-06 PROCEDURE — 83735 ASSAY OF MAGNESIUM: CPT

## 2018-07-06 PROCEDURE — 85730 THROMBOPLASTIN TIME PARTIAL: CPT

## 2018-07-06 PROCEDURE — 83605 ASSAY OF LACTIC ACID: CPT

## 2018-07-06 PROCEDURE — 84132 ASSAY OF SERUM POTASSIUM: CPT

## 2018-07-06 PROCEDURE — 84295 ASSAY OF SERUM SODIUM: CPT

## 2018-07-06 PROCEDURE — 96374 THER/PROPH/DIAG INJ IV PUSH: CPT

## 2018-07-06 PROCEDURE — 82962 GLUCOSE BLOOD TEST: CPT

## 2018-07-06 PROCEDURE — 82435 ASSAY OF BLOOD CHLORIDE: CPT

## 2018-07-06 PROCEDURE — 99239 HOSP IP/OBS DSCHRG MGMT >30: CPT

## 2018-07-06 PROCEDURE — 99261: CPT

## 2018-07-06 PROCEDURE — 99285 EMERGENCY DEPT VISIT HI MDM: CPT | Mod: 25

## 2018-07-06 PROCEDURE — 84100 ASSAY OF PHOSPHORUS: CPT

## 2018-07-06 PROCEDURE — 82947 ASSAY GLUCOSE BLOOD QUANT: CPT

## 2018-07-06 PROCEDURE — 83880 ASSAY OF NATRIURETIC PEPTIDE: CPT

## 2018-07-06 PROCEDURE — 96375 TX/PRO/DX INJ NEW DRUG ADDON: CPT

## 2018-07-06 PROCEDURE — 82803 BLOOD GASES ANY COMBINATION: CPT

## 2018-07-06 RX ORDER — HYDROXYUREA 500 MG/1
1 CAPSULE ORAL
Qty: 13 | Refills: 0
Start: 2018-07-06 | End: 2018-08-04

## 2018-07-06 RX ORDER — SODIUM CHLORIDE 9 MG/ML
500 INJECTION INTRAMUSCULAR; INTRAVENOUS; SUBCUTANEOUS ONCE
Qty: 0 | Refills: 0 | Status: COMPLETED | OUTPATIENT
Start: 2018-07-06 | End: 2018-07-06

## 2018-07-06 RX ORDER — HYDROXYUREA 500 MG/1
500 CAPSULE ORAL
Qty: 0 | Refills: 0 | Status: DISCONTINUED | OUTPATIENT
Start: 2018-07-06 | End: 2018-07-06

## 2018-07-06 RX ORDER — ASPIRIN/CALCIUM CARB/MAGNESIUM 324 MG
1 TABLET ORAL
Qty: 30 | Refills: 0
Start: 2018-07-06 | End: 2018-08-04

## 2018-07-06 RX ORDER — ASPIRIN/CALCIUM CARB/MAGNESIUM 324 MG
1 TABLET ORAL
Qty: 30 | Refills: 0 | OUTPATIENT
Start: 2018-07-06 | End: 2018-08-04

## 2018-07-06 RX ORDER — HYDROXYUREA 500 MG/1
1 CAPSULE ORAL
Qty: 0 | Refills: 0 | DISCHARGE
Start: 2018-07-06 | End: 2018-08-04

## 2018-07-06 RX ADMIN — HEPARIN SODIUM 5000 UNIT(S): 5000 INJECTION INTRAVENOUS; SUBCUTANEOUS at 05:24

## 2018-07-06 RX ADMIN — Medication 81 MILLIGRAM(S): at 13:33

## 2018-07-06 RX ADMIN — Medication 1334 MILLIGRAM(S): at 08:13

## 2018-07-06 RX ADMIN — Medication 60 MILLIGRAM(S): at 05:24

## 2018-07-06 RX ADMIN — SODIUM CHLORIDE 1000 MILLILITER(S): 9 INJECTION INTRAMUSCULAR; INTRAVENOUS; SUBCUTANEOUS at 12:53

## 2018-07-06 NOTE — PROVIDER CONTACT NOTE (OTHER) - ASSESSMENT
patient ao4, c/o upper chest tightness, SOB, nausea, and HR above 130s on tele. Pt is afebrile, /69. 2LNC put on patient as patient is satting 90% on room air; now 94%.

## 2018-07-06 NOTE — PROGRESS NOTE ADULT - PROBLEM SELECTOR PLAN 4
Pt with cirrhosis of unclear etiology, scheduled to see hepatology at the end of this month.  LFT improving, no need to further monitoring
receiving HD now  HD T/T/S  renally dose medications
resolved, had episode of sinus tachycardia post HD yesterday likely due to fluid shifts due to HD, improved with 500cc/bolus  -EKG with no acute ischemic changes  -asymptomatic today with normal HR on tele

## 2018-07-06 NOTE — PROGRESS NOTE ADULT - PROBLEM SELECTOR PLAN 2
K to 6.3 on outpt labs. K wnl s/p HD.  - Monitor K.
Resolved s/p dialysis.   Monitor BMP.
Resolved s/p dialysis.   c/w HD
Resolved s/p dialysis.   c/w HD

## 2018-07-06 NOTE — PROGRESS NOTE ADULT - ASSESSMENT
57F year old female with PMHx of GN w/ ESRD on HD (T/Th/Sa), hx of pancreatic cyst, polycythemia vera confirmed with Jak2 mutation complicated by splenomegaly and splenic vein thrombosis w/ infarct, recently found with likely cirrhosis on CT w/ grade 1 gastric varices who presented to with hyperkalemia and s/p thrombectomy for AV fistula thrombosis. Hematology was consulted for recommendations.     # Hx Polycythemia vera  Patient endorses hx of PV, confirmed with Juan José 2 mutation.   She used to follow with hematology, currently off treatment for ~2-3 years. Most recent labs today show only minimal elevation in WBC of 12 and hgb/hct og 14/46. However, labs from earlier months showed WBC up to 20 and hgb/hct up to 17/55. Patient does have hx of thromboembolic events, goal for hct is < 42%.  - outpatient referral to [facility] for hematology to discuss treatment including phlebotomies, appointment requested  - started on aspirin 81mg daily   - phlebotomy prior to discharge, goal hct < 42%  - will likely need to be started on hydrea, renally dosed, to be discussed as outpatient     # Thrombosis of AV fistula s/p thrombectomy  Patient underwent thrombectomy, currently has working AV fistula.   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 Hematology was consulted for recommendations.     # Hx Polycythemia vera  Patient endorses hx of PV, confirmed with Juan José 2 mutation.   She used to follow with hematology, currently off treatment for ~2-3 years. Most recent labs today show only minimal elevation in WBC of 12 and hgb/hct og 14/46. However, labs from earlier months showed WBC up to 20 and hgb/hct up to 17/55. Patient does have hx of thromboembolic events, goal for hct is < 42%.  - Hematology follow up at [facility]: 7/23 @ 9AM   - started on aspirin 81mg daily   - phlebotomy prior to discharge, goal hct < 42%  - will likely need to be started on hydrea, renally dosed, to be discussed as outpatient      # Thrombosis of AV fistula s/p thrombectomy  Patient underwent thrombectomy, currently has working AV fistula.   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 Hematology was consulted for recommendations.     # Hx Polycythemia vera  Patient endorses hx of PV, confirmed with Juan José 2 mutation.   She used to follow with hematology, currently off treatment for ~2-3 years. Most recent labs today show only minimal elevation in WBC of 12 and hgb/hct og 14/46. However, labs from earlier months showed WBC up to 20 and hgb/hct up to 17/55. Patient does have hx of thromboembolic events, goal for hct is < 42%.  - Hematology follow up at [facility]: 7/23 @ 9AM   - started on aspirin 81mg daily   - phlebotomy prior to discharge, goal hct < 42%  - will need hydrea 500mg after HD    # Thrombosis of AV fistula s/p thrombectomy  Patient underwent thrombectomy, currently has working AV fistula.   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

## 2018-07-06 NOTE — PROGRESS NOTE ADULT - PROBLEM SELECTOR PLAN 1
on HD TTS, follows w/ Dr. De León at [facility].   - Undergoing maintenance HD today. Tolerating it well.   - Monitor BMP.
s/p thrombectomy by Dr. Dempsey as outpatient, AV fistula functioning well  Renal following, c/w HD  outpatient vascular followup
s/p thrombectomy by Dr. Dempsey, AV fistula functioning well at this time for HD.  Renal following.   Given hx of splenic infarct and now with thrombosis of AV fistula, unclear if patient may need treatment of her polycythemia or if there may be other cause of recurrent clotting.   Given renal disease patient was not started on Hydroxyurea.   Will get Hem consult in AM.   Patient also with elevated WBC count, no obvious signs of infection at this time. ? if this may be reactive given procedure and thrombosis. Monitor WBC count, if still elevated will need to d/w hematology as well given pt's PCV
s/p thrombectomy by Dr. Dempsey as outpatient, AV fistula functioning well  Renal following, c/w HD  outpatient vascular followup

## 2018-07-06 NOTE — DISCUSSION/SUMMARY
[ED] : a call from ED [FreeTextEntry1] :  Patient had Thrombosis of arteriovenous fistula s/p thrombectomy by Dr. Dempsey, AV fistula functioning well. Blood work significant for hyperkalemia which improved with HD. Given polycythema vera, and Hgb 14-16 with ESRD with splenic vein thrombosis/splenic infarct and now with thrombosis of AV fistula in setting of polycythema vera, heme was consulted and \par rec phlebotomy x 1 then 500cc bolus which patient refused due to not wanting a new IV. Orthostatics were checked and negative, patient asymptomatic. Also started on aspirin and hydroxyurea 500mg daily post HD on HD days only. Had one episode of Sinus tachycardia post HD yesterday likely due to fluid shifts due to HD, improved with 500cc/bolus. EKG with no acute ischemic changes, asymptomatic and no further events on tele.  Cirrhosis of liver without ascites unclear etiology, scheduled to see hepatology at the end of this month. Elevated LFT on admission improved no need to further monitoring. For ESRD on dialysis c/w HD T/T/S. For HTN c/w amlodipine 5mg daily on non HD days. Patient HD stable without any complaints asking to be discharged, can followup with renal, heme, and vascular surgery as outpatient. [FreeTextEntry3] : Spoke to pt. States she is doing well. has follow up appointments scheduled with [facility].

## 2018-07-06 NOTE — PROGRESS NOTE ADULT - PROBLEM SELECTOR PLAN 5
cirrhosis of unclear etiology, scheduled to see hepatology at the end of this month.  -LFT improving, no need to further monitoring

## 2018-07-06 NOTE — PROGRESS NOTE ADULT - PROBLEM SELECTOR PLAN 8
HSQ  Dispo: discharge home today after phlebotomy if stable, outpatient heme, renal and vascular f/u.  spent 45 minutes on discharge process time    spent 45 minutes on discharge process time

## 2018-07-06 NOTE — PROGRESS NOTE ADULT - PROBLEM SELECTOR PROBLEM 1
ESRD on dialysis
Thrombosis of arteriovenous fistula, subsequent encounter

## 2018-07-06 NOTE — PROGRESS NOTE ADULT - PROBLEM SELECTOR PLAN 6
stable  c/w amlodipine 5mg daily on non HD days
c/w HD T/T/S  renally dose medication  c/w phoslo 1334 tid  c/w lasix 60mg daily

## 2018-07-06 NOTE — PROVIDER CONTACT NOTE (OTHER) - ACTION/TREATMENT ORDERED:
EKG ordered per provider MARCELO Contreras rechecked WDL except HR 120s, patient reports that nausea has decreased. 500cc NS bolus given, patient HR  to 100s & pt comfortable. Pt stable in AM.

## 2018-07-06 NOTE — PROGRESS NOTE ADULT - SUBJECTIVE AND OBJECTIVE BOX
[facility] DIVISION OF KIDNEY DISEASES AND HYPERTENSION -- FOLLOW UP NOTE  --------------------------------------------------------------------------------  Chief Complaint:  ESRD    24 hour events/subjective:  Pt seen during HD. Good blood flows via AVF.      PAST HISTORY  --------------------------------------------------------------------------------  No significant changes to PMH, PSH, FHx, SHx, unless otherwise noted    ALLERGIES & MEDICATIONS  --------------------------------------------------------------------------------  Allergies    No Known Allergies    Intolerances      Standing Inpatient Medications  calcium acetate 1334 milliGRAM(s) Oral three times a day with meals  furosemide    Tablet 60 milliGRAM(s) Oral daily  heparin  Injectable 5000 Unit(s) SubCutaneous every 8 hours    PRN Inpatient Medications  oxyCODONE    5 mG/acetaminophen 325 mG 1 Tablet(s) Oral every 4 hours PRN      REVIEW OF SYSTEMS  --------------------------------------------------------------------------------  Gen: no fatigue, fevers/chills, weakness  Skin: No rashes  Respiratory: No dyspnea, cough  CV: No chest pain, orthopnea  GI: No abdominal pain  : No dysuria, hematuria  MSK: No joint pain/swelling; no edema  Neuro: no confusion    VITALS/PHYSICAL EXAM  --------------------------------------------------------------------------------  T(C): 37.1 (07-05-18 @ 14:13), Max: 37.1 (07-05-18 @ 14:13)  HR: 77 (07-05-18 @ 14:13) (70 - 82)  BP: 139/89 (07-05-18 @ 14:13) (115/72 - 154/84)  RR: 18 (07-05-18 @ 14:13) (18 - 18)  SpO2: 95% (07-05-18 @ 14:13) (94% - 97%)  Wt(kg): --  Height (cm): 157.48 (07-04-18 @ 03:15)  Weight (kg): 52.1 (07-04-18 @ 03:15)  BMI (kg/m2): 21 (07-04-18 @ 03:15)  BSA (m2): 1.51 (07-04-18 @ 03:15)      07-04-18 @ 07:01  -  07-05-18 @ 07:00  --------------------------------------------------------  IN: 720 mL / OUT: 0 mL / NET: 720 mL    07-05-18 @ 07:01  -  07-05-18 @ 16:46  --------------------------------------------------------  IN: 480 mL / OUT: 0 mL / NET: 480 mL      Physical Exam:  	Gen: NAD, well-appearing  	Pulm: CTA B/L  	CV: RRR, S1S2; no rub  	Abd: +BS, soft, nontender/nondistended  	LE: Warm, no edema  	Neuro: No focal deficits  	Psych: Normal affect and mood  	Skin: Warm, without rashes  	Vascular access:  LUE AVF +thrill +bruit +skin intact    LABS/STUDIES  --------------------------------------------------------------------------------              14.7   12.79 >-----------<  150      [07-05-18 @ 07:55]              46.4     140  |  94  |  27  ----------------------------<  97      [07-05-18 @ 06:31]  4.6   |  24  |  6.41        Ca     8.4     [07-05-18 @ 06:31]      Mg     2.0     [07-04-18 @ 06:31]      Phos  4.5     [07-04-18 @ 06:31]    TPro  7.7  /  Alb  3.9  /  TBili  1.7  /  DBili  x   /  AST  14  /  ALT  6   /  AlkPhos  153  [07-05-18 @ 06:31]    PT/INR: PT 13.5 , INR 1.24       [07-03-18 @ 19:15]  PTT: 100.2      [07-03-18 @ 19:15]      Creatinine Trend:  SCr 6.41 [07-05 @ 06:31]  SCr 4.23 [07-04 @ 06:31]  SCr 6.93 [07-03 @ 19:15]        Iron 26, TIBC 68, %sat 38      [03-14-18 @ 10:48]  Ferritin 122      [03-14-18 @ 09:37]  HbA1c 4.3      [03-11-18 @ 09:35]  TSH 1.68      [03-11-18 @ 09:33]  Lipid: chol 107, TG 64, HDL 39, LDL 55      [03-11-18 @ 09:47]
INTERVAL HPI/OVERNIGHT EVENTS:  Patient seen and examined this morning. She was sitting in bed in no distress. I discussed with patient regarding her diagnosis and plan. Yesterday, it was unclear whether she had an actual diagnosis of PV given that her WBC and platelets were not significantly elevated. 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 Also, recent labs from earlier this month and may did show higher hgb/hct levels as well as WBC up to 20s.     VITAL SIGNS:  T(F): 98.2 (07-06-18 @ 04:24)  HR: 90 (07-06-18 @ 04:24)  BP: 112/67 (07-06-18 @ 04:24)  RR: 18 (07-06-18 @ 04:24)  SpO2: 94% (07-06-18 @ 04:24)  Wt(kg): --    PHYSICAL EXAM:    Constitutional: NAD  Eyes: EOMI, sclera non-icteric  Neck: supple, no masses  Respiratory: CTA b/l, good air entry b/l  Cardiovascular: RRR, no M/R/G  Gastrointestinal: soft, NTND, no masses palpable, + BS, no hepatosplenomegaly  Extremities: no c/c/e  Neurological: AAOx3      MEDICATIONS  (STANDING):  aspirin  chewable 81 milliGRAM(s) Oral daily  calcium acetate 1334 milliGRAM(s) Oral three times a day with meals  furosemide    Tablet 60 milliGRAM(s) Oral daily  heparin  Injectable 5000 Unit(s) SubCutaneous every 8 hours  hydroxyurea 500 milliGRAM(s) Oral <User Schedule>  sodium chloride 0.9% Bolus 500 milliLiter(s) IV Bolus once    MEDICATIONS  (PRN):  oxyCODONE    5 mG/acetaminophen 325 mG 1 Tablet(s) Oral every 4 hours PRN Severe Pain (7 - 10)      Allergies    No Known Allergies    Intolerances        LABS:                        14.7   12.79 )-----------( 150      ( 05 Jul 2018 07:55 )             46.4     07-05    140  |  94<L>  |  27<H>  ----------------------------<  97  4.6   |  24  |  6.41<H>    Ca    8.4      05 Jul 2018 06:31    TPro  7.7  /  Alb  3.9  /  TBili  1.7<H>  /  DBili  x   /  AST  14  /  ALT  6<L>  /  AlkPhos  153<H>  07-05          RADIOLOGY & ADDITIONAL TESTS:  Studies reviewed.    ASSESSMENT & PLAN:
Patient is a 57y old  Female who presents with a chief complaint of Hyperkalemia/AV fistula thrombosis s/p thrombectomy (03 Jul 2018 23:45)      SUBJECTIVE / OVERNIGHT EVENTS: No overnight events, feels well. No arm pain. Janett cp/sob, abd pain, nausea, and vomiting    MEDICATIONS  (STANDING):  calcium acetate 1334 milliGRAM(s) Oral three times a day with meals  furosemide    Tablet 60 milliGRAM(s) Oral daily  heparin  Injectable 5000 Unit(s) SubCutaneous every 8 hours    MEDICATIONS  (PRN):  oxyCODONE    5 mG/acetaminophen 325 mG 1 Tablet(s) Oral every 4 hours PRN Severe Pain (7 - 10)      Vital Signs Last 24 Hrs  T(C): 36.6 (05 Jul 2018 12:25), Max: 36.7 (04 Jul 2018 20:17)  T(F): 97.8 (05 Jul 2018 12:25), Max: 98.1 (04 Jul 2018 20:17)  HR: 71 (05 Jul 2018 12:25) (70 - 82)  BP: 154/84 (05 Jul 2018 12:25) (115/72 - 154/84)  BP(mean): --  RR: 18 (05 Jul 2018 12:25) (18 - 18)  SpO2: 96% (05 Jul 2018 12:25) (94% - 97%)  CAPILLARY BLOOD GLUCOSE        I&O's Summary    04 Jul 2018 07:01  -  05 Jul 2018 07:00  --------------------------------------------------------  IN: 720 mL / OUT: 0 mL / NET: 720 mL    05 Jul 2018 07:01  -  05 Jul 2018 13:40  --------------------------------------------------------  IN: 240 mL / OUT: 0 mL / NET: 240 mL        PHYSICAL EXAM:  GENERAL: NAD, well-developed  HEAD:  Atraumatic, Normocephalic  EYES: EOMI, PERRLA, conjunctiva and sclera clear  NECK: Supple, No JVD  CHEST/LUNG: Clear to auscultation bilaterally; No wheeze  HEART: Regular rate and rhythm;   ABDOMEN: Soft, Nontender, Nondistended; Bowel sounds present  EXTREMITIES:  L AVF with good thrill, sutures in place, no drainage  PSYCH: AAOx3      LABS:                        14.7   12.79 )-----------( 150      ( 05 Jul 2018 07:55 )             46.4     07-05    140  |  94<L>  |  27<H>  ----------------------------<  97  4.6   |  24  |  6.41<H>    Ca    8.4      05 Jul 2018 06:31  Phos  4.5     07-04  Mg     2.0     07-04    TPro  7.7  /  Alb  3.9  /  TBili  1.7<H>  /  DBili  x   /  AST  14  /  ALT  6<L>  /  AlkPhos  153<H>  07-05    PT/INR - ( 03 Jul 2018 19:15 )   PT: 13.5 sec;   INR: 1.24 ratio         PTT - ( 03 Jul 2018 19:15 )  PTT:100.2 sec              RADIOLOGY & ADDITIONAL TESTS:    Imaging Personally Reviewed:    Consultant(s) Notes Reviewed:  renal, heme    Care Discussed with Consultants/Other Providers: heme
Patient is a 57y old  Female who presents with a chief complaint of Hyperkalemia/AV fistula thrombosis s/p thrombectomy (03 Jul 2018 23:45)      SUBJECTIVE / OVERNIGHT EVENTS: Patient feels ok, no new complaints.   ROS other wise negative.   Tele no events.     T(C): 36.7 (07-04-18 @ 20:17), Max: 36.9 (07-04-18 @ 11:44)  HR: 82 (07-04-18 @ 20:17) (82 - 89)  BP: 131/80 (07-04-18 @ 20:17) (131/80 - 131/91)  RR: 18 (07-04-18 @ 20:17) (18 - 18)  SpO2: 97% (07-04-18 @ 20:17) (94% - 97%)    MEDICATIONS  (STANDING):  calcium acetate 1334 milliGRAM(s) Oral three times a day with meals  furosemide    Tablet 60 milliGRAM(s) Oral daily  heparin  Injectable 5000 Unit(s) SubCutaneous every 8 hours    MEDICATIONS  (PRN):  oxyCODONE    5 mG/acetaminophen 325 mG 1 Tablet(s) Oral every 4 hours PRN Severe Pain (7 - 10)      PHYSICAL EXAM:  GENERAL: NAD, well-developed  HEAD:  Atraumatic, Normocephalic  EYES: EOMI, conjunctiva and sclera clear  NECK: Supple, No JVD  CHEST/LUNG: Clear to auscultation bilaterally; No wheeze  HEART: Regular rate and rhythm; No murmurs, rubs, or gallops  ABDOMEN: Soft, Nontender, Nondistended; Bowel sounds present  EXTREMITIES:  2+ Peripheral Pulses, No clubbing, cyanosis, or edema  PSYCH: AAOx3  NEUROLOGY: non-focal  SKIN: No rashes or lesions                          15.4   16.55 )-----------( 185      ( 04 Jul 2018 08:22 )             48.3           LIVER FUNCTIONS - ( 04 Jul 2018 06:31 )  Alb: 3.9 g/dL / Pro: 7.7 g/dL / ALK PHOS: 173 U/L / ALT: 10 U/L / AST: 18 U/L / GGT: x           PT/INR - ( 03 Jul 2018 19:15 )   PT: 13.5 sec;   INR: 1.24 ratio         PTT - ( 03 Jul 2018 19:15 )  PTT:100.2 sec  135|92|17<93  3.9|23|4.23  8.5,2.0,4.5  07-04 @ 06:31  --|--|--<--  3.8|--|--  --,--,--  07-04 @ 00:41        RADIOLOGY & ADDITIONAL TESTS:    Imaging Personally Reviewed:    Consultant(s) Notes Reviewed:      Care Discussed with Consultants/Other Providers:
Patient is a 57y old  Female who presents with a chief complaint of Hyperkalemia/AV fistula thrombosis s/p thrombectomy (05 Jul 2018 17:33)      SUBJECTIVE / OVERNIGHT EVENTS: Yesterday after HD patient got nauseous and had episode of sinus tachy on tele to 120s, EKG reviewed sinus tachy, was given 500cc bolus and symptoms resolved.  Pt reports she gets this after HD sometimes. this AM, feels well wants to go home, denies any cp, sob, palpitations, nausea, vomiting tolerating PO. Agreeable to phlebetomy     MEDICATIONS  (STANDING):  aspirin  chewable 81 milliGRAM(s) Oral daily  calcium acetate 1334 milliGRAM(s) Oral three times a day with meals  furosemide    Tablet 60 milliGRAM(s) Oral daily  heparin  Injectable 5000 Unit(s) SubCutaneous every 8 hours  hydroxyurea 500 milliGRAM(s) Oral <User Schedule>  sodium chloride 0.9% Bolus 500 milliLiter(s) IV Bolus once    MEDICATIONS  (PRN):  oxyCODONE    5 mG/acetaminophen 325 mG 1 Tablet(s) Oral every 4 hours PRN Severe Pain (7 - 10)      Vital Signs Last 24 Hrs  T(C): 36.7 (06 Jul 2018 11:40), Max: 37.1 (05 Jul 2018 14:13)  T(F): 98.1 (06 Jul 2018 11:40), Max: 98.7 (05 Jul 2018 14:13)  HR: 97 (06 Jul 2018 11:40) (71 - 120)  BP: 107/75 (06 Jul 2018 11:40) (107/75 - 154/84)  BP(mean): --  RR: 18 (06 Jul 2018 11:40) (18 - 18)  SpO2: 96% (06 Jul 2018 11:40) (94% - 97%)  CAPILLARY BLOOD GLUCOSE        I&O's Summary    05 Jul 2018 07:01  -  06 Jul 2018 07:00  --------------------------------------------------------  IN: 1080 mL / OUT: 2000 mL / NET: -920 mL    06 Jul 2018 07:01  -  06 Jul 2018 11:57  --------------------------------------------------------  IN: 240 mL / OUT: 0 mL / NET: 240 mL        PHYSICPHYSICAL EXAM:  GENERAL: NAD, well-developed, walking around room  HEAD:  Atraumatic, Normocephalic,  NECK: Supple, No JVD  CHEST/LUNG: Clear to auscultation bilaterally; No wheeze  HEART: Regular rate and rhythm;   ABDOMEN: Soft, Nontender, Nondistended; Bowel sounds present  EXTREMITIES:  L AVF with good thrill, sutures in place, no drainage  PSYCH: AAOx3    LABS:                        14.7   12.79 )-----------( 150      ( 05 Jul 2018 07:55 )             46.4     07-05    140  |  94<L>  |  27<H>  ----------------------------<  97  4.6   |  24  |  6.41<H>    Ca    8.4      05 Jul 2018 06:31    TPro  7.7  /  Alb  3.9  /  TBili  1.7<H>  /  DBili  x   /  AST  14  /  ALT  6<L>  /  AlkPhos  153<H>  07-05                  RADIOLOGY & ADDITIONAL TESTS:    personally reviewed EKG: sinus tachy in 120s, improved    tele reviewed: sinus 80-110s    Imaging Personally Reviewed:    Consultant(s) Notes Reviewed:  renal, heme onc    Care Discussed with Consultants/Other Providers: heme fellow - phlebtomy today ssee below

## 2018-07-06 NOTE — PROGRESS NOTE ADULT - PROBLEM SELECTOR PLAN 7
HSQ  Dispo: f/u with heme if phlebotomy will be done as inpatient, otherwise can be discharge home with outpatient heme, vascular surgery and renal followup    spent 45 minutes on discharge process time
stable  c/w amlodipine 5mg daily on non HD days

## 2018-07-06 NOTE — PROGRESS NOTE ADULT - PROBLEM SELECTOR PROBLEM 4
Cirrhosis of liver without ascites, unspecified hepatic cirrhosis type
ESRD on dialysis
Sinus tachycardia

## 2018-07-06 NOTE — PROGRESS NOTE ADULT - PROBLEM SELECTOR PROBLEM 3
Cirrhosis of liver without ascites, unspecified hepatic cirrhosis type
Hyperphosphatemia
Polycythemia vera
Polycythemia vera

## 2018-07-09 ENCOUNTER — APPOINTMENT (OUTPATIENT)
Dept: VASCULAR SURGERY | Facility: CLINIC | Age: 58
End: 2018-07-09

## 2018-07-12 ENCOUNTER — RX RENEWAL (OUTPATIENT)
Age: 58
End: 2018-07-12

## 2018-07-13 ENCOUNTER — RX RENEWAL (OUTPATIENT)
Age: 58
End: 2018-07-13

## 2018-07-17 ENCOUNTER — APPOINTMENT (OUTPATIENT)
Dept: VASCULAR SURGERY | Facility: CLINIC | Age: 58
End: 2018-07-17
Payer: MEDICAID

## 2018-07-17 PROCEDURE — 93990 DOPPLER FLOW TESTING: CPT

## 2018-07-17 PROCEDURE — XXXXX: CPT

## 2018-07-19 ENCOUNTER — OUTPATIENT (OUTPATIENT)
Dept: OUTPATIENT SERVICES | Facility: HOSPITAL | Age: 58
LOS: 1 days | Discharge: HOME | End: 2018-07-19

## 2018-07-19 DIAGNOSIS — I77.0 ARTERIOVENOUS FISTULA, ACQUIRED: Chronic | ICD-10-CM

## 2018-07-19 DIAGNOSIS — T85.898A OTHER SPECIFIED COMPLICATION OF OTHER INTERNAL PROSTHETIC DEVICES, IMPLANTS AND GRAFTS, INITIAL ENCOUNTER: Chronic | ICD-10-CM

## 2018-07-19 DIAGNOSIS — Z99.2 DEPENDENCE ON RENAL DIALYSIS: Chronic | ICD-10-CM

## 2018-07-19 DIAGNOSIS — D75.1 SECONDARY POLYCYTHEMIA: ICD-10-CM

## 2018-07-20 ENCOUNTER — APPOINTMENT (OUTPATIENT)
Dept: INTERNAL MEDICINE | Facility: CLINIC | Age: 58
End: 2018-07-20

## 2018-07-23 ENCOUNTER — APPOINTMENT (OUTPATIENT)
Dept: HEMATOLOGY ONCOLOGY | Facility: CLINIC | Age: 58
End: 2018-07-23

## 2018-07-31 ENCOUNTER — APPOINTMENT (OUTPATIENT)
Dept: GASTROENTEROLOGY | Facility: HOSPITAL | Age: 58
End: 2018-07-31

## 2018-07-31 ENCOUNTER — FORM ENCOUNTER (OUTPATIENT)
Age: 58
End: 2018-07-31

## 2018-08-01 ENCOUNTER — APPOINTMENT (OUTPATIENT)
Dept: ENDOVASCULAR SURGERY | Facility: CLINIC | Age: 58
End: 2018-08-01
Payer: MEDICAID

## 2018-08-01 PROCEDURE — 36902Z: CUSTOM | Mod: 59

## 2018-08-01 PROCEDURE — 36908Z: CUSTOM

## 2018-08-06 ENCOUNTER — RESULT REVIEW (OUTPATIENT)
Age: 58
End: 2018-08-06

## 2018-08-06 ENCOUNTER — APPOINTMENT (OUTPATIENT)
Dept: HEMATOLOGY ONCOLOGY | Facility: CLINIC | Age: 58
End: 2018-08-06

## 2018-08-06 VITALS
SYSTOLIC BLOOD PRESSURE: 124 MMHG | OXYGEN SATURATION: 98 % | TEMPERATURE: 97.9 F | BODY MASS INDEX: 22.73 KG/M2 | RESPIRATION RATE: 14 BRPM | DIASTOLIC BLOOD PRESSURE: 79 MMHG | WEIGHT: 116.4 LBS | HEART RATE: 82 BPM

## 2018-08-06 LAB
HCT VFR BLD CALC: 39.2 % — SIGNIFICANT CHANGE UP (ref 34.5–45)
HGB BLD-MCNC: 12.3 G/DL — SIGNIFICANT CHANGE UP (ref 11.5–15.5)
MCHC RBC-ENTMCNC: 25.1 PG — LOW (ref 27–34)
MCHC RBC-ENTMCNC: 31.4 G/DL — LOW (ref 32–36)
MCV RBC AUTO: 79.9 FL — LOW (ref 80–100)
PLATELET # BLD AUTO: 206 K/UL — SIGNIFICANT CHANGE UP (ref 150–400)
RBC # BLD: 4.9 M/UL — SIGNIFICANT CHANGE UP (ref 3.8–5.2)
RBC # FLD: 16.3 % — HIGH (ref 10.3–14.5)
WBC # BLD: 9.4 K/UL — SIGNIFICANT CHANGE UP (ref 3.8–10.5)
WBC # FLD AUTO: 9.4 K/UL — SIGNIFICANT CHANGE UP (ref 3.8–10.5)

## 2018-08-21 ENCOUNTER — RX RENEWAL (OUTPATIENT)
Age: 58
End: 2018-08-21

## 2018-09-18 ENCOUNTER — FORM ENCOUNTER (OUTPATIENT)
Age: 58
End: 2018-09-18

## 2018-09-19 ENCOUNTER — APPOINTMENT (OUTPATIENT)
Dept: ENDOVASCULAR SURGERY | Facility: CLINIC | Age: 58
End: 2018-09-19
Payer: MEDICAID

## 2018-09-19 PROCEDURE — 36902Z: CUSTOM

## 2018-09-19 PROCEDURE — 36907Z: CUSTOM | Mod: 59

## 2018-11-01 ENCOUNTER — OUTPATIENT (OUTPATIENT)
Dept: OUTPATIENT SERVICES | Facility: HOSPITAL | Age: 58
LOS: 1 days | Discharge: HOME | End: 2018-11-01

## 2018-11-01 DIAGNOSIS — D75.1 SECONDARY POLYCYTHEMIA: ICD-10-CM

## 2018-11-01 DIAGNOSIS — T85.898A OTHER SPECIFIED COMPLICATION OF OTHER INTERNAL PROSTHETIC DEVICES, IMPLANTS AND GRAFTS, INITIAL ENCOUNTER: Chronic | ICD-10-CM

## 2018-11-01 DIAGNOSIS — I77.0 ARTERIOVENOUS FISTULA, ACQUIRED: Chronic | ICD-10-CM

## 2018-11-01 DIAGNOSIS — Z99.2 DEPENDENCE ON RENAL DIALYSIS: Chronic | ICD-10-CM

## 2018-11-07 ENCOUNTER — APPOINTMENT (OUTPATIENT)
Dept: ENDOVASCULAR SURGERY | Facility: CLINIC | Age: 58
End: 2018-11-07

## 2018-11-12 ENCOUNTER — APPOINTMENT (OUTPATIENT)
Dept: HEMATOLOGY ONCOLOGY | Facility: CLINIC | Age: 58
End: 2018-11-12

## 2018-11-12 ENCOUNTER — RESULT REVIEW (OUTPATIENT)
Age: 58
End: 2018-11-12

## 2018-11-12 DIAGNOSIS — E83.39 OTHER DISORDERS OF PHOSPHORUS METABOLISM: ICD-10-CM

## 2018-11-12 LAB
HCT VFR BLD CALC: 44.1 % — SIGNIFICANT CHANGE UP (ref 34.5–45)
HGB BLD-MCNC: 14.5 G/DL — SIGNIFICANT CHANGE UP (ref 11.5–15.5)
MCHC RBC-ENTMCNC: 29.6 PG — SIGNIFICANT CHANGE UP (ref 27–34)
MCHC RBC-ENTMCNC: 32.9 G/DL — SIGNIFICANT CHANGE UP (ref 32–36)
MCV RBC AUTO: 89.9 FL — SIGNIFICANT CHANGE UP (ref 80–100)
PLATELET # BLD AUTO: 170 K/UL — SIGNIFICANT CHANGE UP (ref 150–400)
RBC # BLD: 4.91 M/UL — SIGNIFICANT CHANGE UP (ref 3.8–5.2)
RBC # FLD: 21.2 % — HIGH (ref 10.3–14.5)
WBC # BLD: 7.4 K/UL — SIGNIFICANT CHANGE UP (ref 3.8–10.5)
WBC # FLD AUTO: 7.4 K/UL — SIGNIFICANT CHANGE UP (ref 3.8–10.5)

## 2018-11-12 NOTE — REVIEW OF SYSTEMS
[Fever] : no fever [Chills] : no chills [Dysphagia] : no dysphagia [Chest Pain] : no chest pain [Shortness Of Breath] : no shortness of breath [Abdominal Pain] : no abdominal pain [Dysuria] : no dysuria [Dysmenorrhea/Abn Vaginal Bleeding] : no dysmenorrhea/abnormal vaginal bleeding [Joint Pain] : no joint pain [Skin Rash] : no skin rash [Easy Bleeding] : no tendency for easy bleeding [Easy Bruising] : no tendency for easy bruising

## 2018-11-12 NOTE — HISTORY OF PRESENT ILLNESS
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 Hollie Guzmán in [place] (# [phone]).  She has had a bone marrow biopsy done at the time of diagnosis, but does not know the results.  Patient was previously on Hydrea, however was non-adherent.  She required phlebotomy 1-2 times per year for rising Hct.   No history of arterial clots (no MI, CVA, limb ischemia) \par \par Abdominal US done May 2017 showed: attenuated main splenic vein indicative of previous/chronic thrombosis with surrounding patent varicosities; Portal venous system is patent with hepatopedal blood flow;  Patent hepatic veins.\par \par She was admitted to [facility] from July 3-5, 2018 due to hyperkalemia and thrombosis of her AVF. Hematology was consulted and recommended phlebotomy, but patient refused.  She was discharged on Hydrea 500mg on HD days. \par \par Aug 2018: Hgb 12 and platelets 206, continued Hydrea 500mg on Tues/Thurs/Sat (HD days, to be taken after HD), as well as aspirin 81mg.   [de-identified] : Feeling well, no acute complaints.  Has been tolerating dialysis without issue.

## 2018-11-12 NOTE — ASSESSMENT
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 Dose reduction for ESRD. Patient verbalized understanding. \par - continue aspirin 81mg daily\par - follow-up in 3 months for repeat labs\par - refills for hydrea sent to [facility]\par \par Case discussed with Dr. Ahuja.\par

## 2018-11-13 PROBLEM — E83.39 HYPERPHOSPHATEMIA: Status: ACTIVE | Noted: 2017-09-22

## 2018-12-06 ENCOUNTER — INPATIENT (INPATIENT)
Facility: HOSPITAL | Age: 58
LOS: 2 days | Discharge: HOME | DRG: 683 | End: 2018-12-09
Attending: HOSPITALIST | Admitting: HOSPITALIST
Payer: MEDICAID

## 2018-12-06 VITALS
HEIGHT: 64 IN | DIASTOLIC BLOOD PRESSURE: 78 MMHG | SYSTOLIC BLOOD PRESSURE: 112 MMHG | RESPIRATION RATE: 20 BRPM | HEART RATE: 79 BPM | OXYGEN SATURATION: 99 % | TEMPERATURE: 98 F | WEIGHT: 116.18 LBS

## 2018-12-06 DIAGNOSIS — I77.0 ARTERIOVENOUS FISTULA, ACQUIRED: Chronic | ICD-10-CM

## 2018-12-06 DIAGNOSIS — T85.898A OTHER SPECIFIED COMPLICATION OF OTHER INTERNAL PROSTHETIC DEVICES, IMPLANTS AND GRAFTS, INITIAL ENCOUNTER: Chronic | ICD-10-CM

## 2018-12-06 DIAGNOSIS — Z99.2 DEPENDENCE ON RENAL DIALYSIS: Chronic | ICD-10-CM

## 2018-12-06 LAB
APTT BLD: 40.4 SEC — HIGH (ref 27.5–36.3)
GAS PNL BLDV: SIGNIFICANT CHANGE UP
INR BLD: 1.12 RATIO — SIGNIFICANT CHANGE UP (ref 0.88–1.16)
PROTHROM AB SERPL-ACNC: 12.9 SEC — SIGNIFICANT CHANGE UP (ref 10–12.9)

## 2018-12-06 PROCEDURE — 99285 EMERGENCY DEPT VISIT HI MDM: CPT | Mod: 25

## 2018-12-06 PROCEDURE — 71275 CT ANGIOGRAPHY CHEST: CPT | Mod: 26

## 2018-12-06 PROCEDURE — 93010 ELECTROCARDIOGRAM REPORT: CPT

## 2018-12-06 PROCEDURE — 74174 CTA ABD&PLVS W/CONTRAST: CPT | Mod: 26

## 2018-12-06 RX ORDER — ONDANSETRON 8 MG/1
4 TABLET, FILM COATED ORAL ONCE
Qty: 0 | Refills: 0 | Status: COMPLETED | OUTPATIENT
Start: 2018-12-06 | End: 2018-12-06

## 2018-12-06 RX ADMIN — ONDANSETRON 4 MILLIGRAM(S): 8 TABLET, FILM COATED ORAL at 23:38

## 2018-12-06 NOTE — ED PROVIDER NOTE - PHYSICAL EXAMINATION
Yesika Cervantes, DO:   Gen: Well appearing, NAD  Head: NCAT  HEENT: PERRL, MMM, normal conjunctiva, anicteric, neck supple  Lung: CTAB, no adventitious sounds  CV: RRR, no murmurs  Abd: soft, diffusely TTP, diffuse involuntary guarding, no CVAT  MSK: No edema, no visible deformities  Neuro: Moving all extremities equally.   Heme: radial & distal pulses 2/3   Skin: Cool LE & warm UE, dry. No evidence of rash  Psych: normal mood and affect

## 2018-12-06 NOTE — ED PROVIDER NOTE - ATTENDING CONTRIBUTION TO CARE
attending Gerardo: Pt with h/o ESRD on HD (T/Th/Sa, full session today), P vera, prior pancreatic cyst, splenomegaly and splenic vein thrombosis w/ infarct, cirrhosis w/ grade 1 gastric varices, hx of hemoperitoneum 2/2 PD p/w abdominal pain x hours with assoc nausea. Reports similar pain to prior episode of hemoperitoneum. Makes urine. Also with diffuse myalgias. Received flu shot this year. On exam, abdomen soft, diffusely tender with guarding, no rigidity or rebound. Will obtain labs, UA, CTA and likely admit

## 2018-12-06 NOTE — ED PROVIDER NOTE - MEDICAL DECISION MAKING DETAILS
Yesika Cervantes, DO: 58 stoic F with multiple comorbidities with abdominal TTP and unexplained myalgias. CTA to eval for vascular pathology & abdominal pathology. Patient declining pain meds at this time.

## 2018-12-06 NOTE — ED PROVIDER NOTE - PROGRESS NOTE DETAILS
Yesika Cervantes DO: patient remains HDS. No obvious source of pain. Patient refusing rectal temp. Pending urine & repeat BMP. Yesika Cervantes, DO: Pt with glucose of 60 on BMP, ate a meal now with FS >100.

## 2018-12-06 NOTE — ED PROVIDER NOTE - OBJECTIVE STATEMENT
Yesika Cervantes, DO: hx of GN w/ ESRD on HD (T/Th/Sa), hx of pancreatic cyst, polycythemia vera c/b splenomegaly and splenic vein thrombosis w/ infarct, cirrhosis w/ grade 1 gastric varices, hx of hemoperitoneum 2/2 PD here for abdominal pain that feels similar to when she had hemoperitoneum. ?bilious emesis & NB diarrhea. Patient with diffuse myalgias. No fevers, chest pain, sob. Yesika Cervantes, DO: hx of GN w/ ESRD on HD (T/Th/Sa) with dialysis 12/6, hx of pancreatic cyst, polycythemia vera c/b splenomegaly and splenic vein thrombosis w/ infarct, cirrhosis w/ grade 1 gastric varices, hx of hemoperitoneum 2/2 PD here for abdominal pain that feels similar to when she had hemoperitoneum. ?bilious emesis & NB diarrhea. Patient with diffuse myalgias. No fevers, chest pain, sob.    PMD: Genet

## 2018-12-07 DIAGNOSIS — N18.9 CHRONIC KIDNEY DISEASE, UNSPECIFIED: ICD-10-CM

## 2018-12-07 DIAGNOSIS — I95.9 HYPOTENSION, UNSPECIFIED: ICD-10-CM

## 2018-12-07 DIAGNOSIS — I10 ESSENTIAL (PRIMARY) HYPERTENSION: ICD-10-CM

## 2018-12-07 DIAGNOSIS — R10.84 GENERALIZED ABDOMINAL PAIN: ICD-10-CM

## 2018-12-07 DIAGNOSIS — Z29.9 ENCOUNTER FOR PROPHYLACTIC MEASURES, UNSPECIFIED: ICD-10-CM

## 2018-12-07 DIAGNOSIS — K27.9 PEPTIC ULCER, SITE UNSPECIFIED, UNSPECIFIED AS ACUTE OR CHRONIC, WITHOUT HEMORRHAGE OR PERFORATION: ICD-10-CM

## 2018-12-07 DIAGNOSIS — D45 POLYCYTHEMIA VERA: ICD-10-CM

## 2018-12-07 DIAGNOSIS — N18.6 END STAGE RENAL DISEASE: ICD-10-CM

## 2018-12-07 DIAGNOSIS — R10.9 UNSPECIFIED ABDOMINAL PAIN: ICD-10-CM

## 2018-12-07 DIAGNOSIS — E87.5 HYPERKALEMIA: ICD-10-CM

## 2018-12-07 LAB
ALBUMIN SERPL ELPH-MCNC: 4.9 G/DL — SIGNIFICANT CHANGE UP (ref 3.3–5)
ALP SERPL-CCNC: 211 U/L — HIGH (ref 40–120)
ALT FLD-CCNC: 7 U/L — LOW (ref 10–45)
ANION GAP SERPL CALC-SCNC: 16 MMOL/L — SIGNIFICANT CHANGE UP (ref 5–17)
ANION GAP SERPL CALC-SCNC: 22 MMOL/L — HIGH (ref 5–17)
APPEARANCE UR: CLEAR — SIGNIFICANT CHANGE UP
AST SERPL-CCNC: 13 U/L — SIGNIFICANT CHANGE UP (ref 10–40)
BACTERIA # UR AUTO: ABNORMAL
BASOPHILS # BLD AUTO: 0 K/UL — SIGNIFICANT CHANGE UP (ref 0–0.2)
BASOPHILS # BLD AUTO: 0 K/UL — SIGNIFICANT CHANGE UP (ref 0–0.2)
BASOPHILS NFR BLD AUTO: 0.3 % — SIGNIFICANT CHANGE UP (ref 0–2)
BASOPHILS NFR BLD AUTO: 0.4 % — SIGNIFICANT CHANGE UP (ref 0–2)
BILIRUB SERPL-MCNC: 2.4 MG/DL — HIGH (ref 0.2–1.2)
BILIRUB UR-MCNC: NEGATIVE — SIGNIFICANT CHANGE UP
BUN SERPL-MCNC: 30 MG/DL — HIGH (ref 7–23)
BUN SERPL-MCNC: 32 MG/DL — HIGH (ref 7–23)
CALCIUM SERPL-MCNC: 9.2 MG/DL — SIGNIFICANT CHANGE UP (ref 8.4–10.5)
CALCIUM SERPL-MCNC: 9.9 MG/DL — SIGNIFICANT CHANGE UP (ref 8.4–10.5)
CHLORIDE SERPL-SCNC: 89 MMOL/L — LOW (ref 96–108)
CHLORIDE SERPL-SCNC: 89 MMOL/L — LOW (ref 96–108)
CO2 SERPL-SCNC: 21 MMOL/L — LOW (ref 22–31)
CO2 SERPL-SCNC: 26 MMOL/L — SIGNIFICANT CHANGE UP (ref 22–31)
COLOR SPEC: YELLOW — SIGNIFICANT CHANGE UP
CREAT SERPL-MCNC: 4.95 MG/DL — HIGH (ref 0.5–1.3)
CREAT SERPL-MCNC: 5.15 MG/DL — HIGH (ref 0.5–1.3)
DIFF PNL FLD: NEGATIVE — SIGNIFICANT CHANGE UP
EOSINOPHIL # BLD AUTO: 0.1 K/UL — SIGNIFICANT CHANGE UP (ref 0–0.5)
EOSINOPHIL # BLD AUTO: 0.1 K/UL — SIGNIFICANT CHANGE UP (ref 0–0.5)
EOSINOPHIL NFR BLD AUTO: 1.3 % — SIGNIFICANT CHANGE UP (ref 0–6)
EOSINOPHIL NFR BLD AUTO: 2 % — SIGNIFICANT CHANGE UP (ref 0–6)
EPI CELLS # UR: 10 /HPF — HIGH
GLUCOSE SERPL-MCNC: 127 MG/DL — HIGH (ref 70–99)
GLUCOSE SERPL-MCNC: 60 MG/DL — LOW (ref 70–99)
GLUCOSE UR QL: ABNORMAL
HCT VFR BLD CALC: 46.2 % — HIGH (ref 34.5–45)
HCT VFR BLD CALC: 52.6 % — HIGH (ref 34.5–45)
HGB BLD-MCNC: 16.7 G/DL — HIGH (ref 11.5–15.5)
HGB BLD-MCNC: 18.6 G/DL — HIGH (ref 11.5–15.5)
HYALINE CASTS # UR AUTO: 3 /LPF — HIGH (ref 0–2)
KETONES UR-MCNC: NEGATIVE — SIGNIFICANT CHANGE UP
LEUKOCYTE ESTERASE UR-ACNC: NEGATIVE — SIGNIFICANT CHANGE UP
LIDOCAIN IGE QN: 66 U/L — HIGH (ref 7–60)
LYMPHOCYTES # BLD AUTO: 1 K/UL — SIGNIFICANT CHANGE UP (ref 1–3.3)
LYMPHOCYTES # BLD AUTO: 1.4 K/UL — SIGNIFICANT CHANGE UP (ref 1–3.3)
LYMPHOCYTES # BLD AUTO: 10.4 % — LOW (ref 13–44)
LYMPHOCYTES # BLD AUTO: 22.1 % — SIGNIFICANT CHANGE UP (ref 13–44)
MAGNESIUM SERPL-MCNC: 2.4 MG/DL — SIGNIFICANT CHANGE UP (ref 1.6–2.6)
MCHC RBC-ENTMCNC: 32.5 PG — SIGNIFICANT CHANGE UP (ref 27–34)
MCHC RBC-ENTMCNC: 33.2 PG — SIGNIFICANT CHANGE UP (ref 27–34)
MCHC RBC-ENTMCNC: 35.4 GM/DL — SIGNIFICANT CHANGE UP (ref 32–36)
MCHC RBC-ENTMCNC: 36.1 GM/DL — HIGH (ref 32–36)
MCV RBC AUTO: 92 FL — SIGNIFICANT CHANGE UP (ref 80–100)
MCV RBC AUTO: 92 FL — SIGNIFICANT CHANGE UP (ref 80–100)
MONOCYTES # BLD AUTO: 0.3 K/UL — SIGNIFICANT CHANGE UP (ref 0–0.9)
MONOCYTES # BLD AUTO: 0.6 K/UL — SIGNIFICANT CHANGE UP (ref 0–0.9)
MONOCYTES NFR BLD AUTO: 4.6 % — SIGNIFICANT CHANGE UP (ref 2–14)
MONOCYTES NFR BLD AUTO: 5.8 % — SIGNIFICANT CHANGE UP (ref 2–14)
NEUTROPHILS # BLD AUTO: 4.6 K/UL — SIGNIFICANT CHANGE UP (ref 1.8–7.4)
NEUTROPHILS # BLD AUTO: 8.1 K/UL — HIGH (ref 1.8–7.4)
NEUTROPHILS NFR BLD AUTO: 71 % — SIGNIFICANT CHANGE UP (ref 43–77)
NEUTROPHILS NFR BLD AUTO: 82.1 % — HIGH (ref 43–77)
NITRITE UR-MCNC: NEGATIVE — SIGNIFICANT CHANGE UP
PH UR: 8 — SIGNIFICANT CHANGE UP (ref 5–8)
PHOSPHATE SERPL-MCNC: 2.8 MG/DL — SIGNIFICANT CHANGE UP (ref 2.5–4.5)
PLATELET # BLD AUTO: 170 K/UL — SIGNIFICANT CHANGE UP (ref 150–400)
PLATELET # BLD AUTO: 191 K/UL — SIGNIFICANT CHANGE UP (ref 150–400)
POTASSIUM SERPL-MCNC: 4.7 MMOL/L — SIGNIFICANT CHANGE UP (ref 3.5–5.3)
POTASSIUM SERPL-MCNC: 6 MMOL/L — HIGH (ref 3.5–5.3)
POTASSIUM SERPL-SCNC: 4.7 MMOL/L — SIGNIFICANT CHANGE UP (ref 3.5–5.3)
POTASSIUM SERPL-SCNC: 6 MMOL/L — HIGH (ref 3.5–5.3)
PROT SERPL-MCNC: 9.1 G/DL — HIGH (ref 6–8.3)
PROT UR-MCNC: ABNORMAL
RAPID RVP RESULT: SIGNIFICANT CHANGE UP
RBC # BLD: 5.02 M/UL — SIGNIFICANT CHANGE UP (ref 3.8–5.2)
RBC # BLD: 5.72 M/UL — HIGH (ref 3.8–5.2)
RBC # FLD: 18.9 % — HIGH (ref 10.3–14.5)
RBC # FLD: 19.3 % — HIGH (ref 10.3–14.5)
RBC CASTS # UR COMP ASSIST: 2 /HPF — SIGNIFICANT CHANGE UP (ref 0–4)
SODIUM SERPL-SCNC: 131 MMOL/L — LOW (ref 135–145)
SODIUM SERPL-SCNC: 132 MMOL/L — LOW (ref 135–145)
SP GR SPEC: 1.03 — HIGH (ref 1.01–1.02)
UROBILINOGEN FLD QL: NEGATIVE — SIGNIFICANT CHANGE UP
WBC # BLD: 6.5 K/UL — SIGNIFICANT CHANGE UP (ref 3.8–10.5)
WBC # BLD: 9.9 K/UL — SIGNIFICANT CHANGE UP (ref 3.8–10.5)
WBC # FLD AUTO: 6.5 K/UL — SIGNIFICANT CHANGE UP (ref 3.8–10.5)
WBC # FLD AUTO: 9.9 K/UL — SIGNIFICANT CHANGE UP (ref 3.8–10.5)
WBC UR QL: 8 /HPF — HIGH (ref 0–5)

## 2018-12-07 PROCEDURE — 99223 1ST HOSP IP/OBS HIGH 75: CPT

## 2018-12-07 PROCEDURE — 99223 1ST HOSP IP/OBS HIGH 75: CPT | Mod: GC

## 2018-12-07 RX ORDER — SODIUM CHLORIDE 9 MG/ML
500 INJECTION INTRAMUSCULAR; INTRAVENOUS; SUBCUTANEOUS ONCE
Qty: 0 | Refills: 0 | Status: COMPLETED | OUTPATIENT
Start: 2018-12-07 | End: 2018-12-07

## 2018-12-07 RX ORDER — HYDROXYUREA 500 MG/1
500 CAPSULE ORAL
Qty: 0 | Refills: 0 | Status: DISCONTINUED | OUTPATIENT
Start: 2018-12-08 | End: 2018-12-09

## 2018-12-07 RX ORDER — CALCIUM GLUCONATE 100 MG/ML
1 VIAL (ML) INTRAVENOUS ONCE
Qty: 0 | Refills: 0 | Status: COMPLETED | OUTPATIENT
Start: 2018-12-07 | End: 2018-12-07

## 2018-12-07 RX ORDER — FAMOTIDINE 10 MG/ML
20 INJECTION INTRAVENOUS
Qty: 0 | Refills: 0 | Status: DISCONTINUED | OUTPATIENT
Start: 2018-12-07 | End: 2018-12-07

## 2018-12-07 RX ORDER — ASPIRIN/CALCIUM CARB/MAGNESIUM 324 MG
81 TABLET ORAL DAILY
Qty: 0 | Refills: 0 | Status: DISCONTINUED | OUTPATIENT
Start: 2018-12-07 | End: 2018-12-09

## 2018-12-07 RX ORDER — SODIUM CHLORIDE 9 MG/ML
500 INJECTION, SOLUTION INTRAVENOUS ONCE
Qty: 0 | Refills: 0 | Status: COMPLETED | OUTPATIENT
Start: 2018-12-07 | End: 2018-12-07

## 2018-12-07 RX ORDER — FUROSEMIDE 40 MG
60 TABLET ORAL DAILY
Qty: 0 | Refills: 0 | Status: DISCONTINUED | OUTPATIENT
Start: 2018-12-08 | End: 2018-12-09

## 2018-12-07 RX ORDER — FAMOTIDINE 10 MG/ML
20 INJECTION INTRAVENOUS
Qty: 0 | Refills: 0 | Status: DISCONTINUED | OUTPATIENT
Start: 2018-12-07 | End: 2018-12-09

## 2018-12-07 RX ORDER — DEXTROSE 50 % IN WATER 50 %
25 SYRINGE (ML) INTRAVENOUS ONCE
Qty: 0 | Refills: 0 | Status: COMPLETED | OUTPATIENT
Start: 2018-12-07 | End: 2018-12-07

## 2018-12-07 RX ORDER — AMLODIPINE BESYLATE 2.5 MG/1
5 TABLET ORAL
Qty: 0 | Refills: 0 | Status: DISCONTINUED | OUTPATIENT
Start: 2018-12-07 | End: 2018-12-09

## 2018-12-07 RX ORDER — HEPARIN SODIUM 5000 [USP'U]/ML
5000 INJECTION INTRAVENOUS; SUBCUTANEOUS EVERY 8 HOURS
Qty: 0 | Refills: 0 | Status: DISCONTINUED | OUTPATIENT
Start: 2018-12-07 | End: 2018-12-09

## 2018-12-07 RX ORDER — CALCIUM ACETATE 667 MG
1334 TABLET ORAL
Qty: 0 | Refills: 0 | Status: DISCONTINUED | OUTPATIENT
Start: 2018-12-07 | End: 2018-12-09

## 2018-12-07 RX ORDER — SODIUM CHLORIDE 9 MG/ML
250 INJECTION INTRAMUSCULAR; INTRAVENOUS; SUBCUTANEOUS ONCE
Qty: 0 | Refills: 0 | Status: DISCONTINUED | OUTPATIENT
Start: 2018-12-07 | End: 2018-12-07

## 2018-12-07 RX ORDER — ONDANSETRON 8 MG/1
4 TABLET, FILM COATED ORAL EVERY 6 HOURS
Qty: 0 | Refills: 0 | Status: DISCONTINUED | OUTPATIENT
Start: 2018-12-07 | End: 2018-12-09

## 2018-12-07 RX ORDER — ACETAMINOPHEN 500 MG
650 TABLET ORAL EVERY 6 HOURS
Qty: 0 | Refills: 0 | Status: DISCONTINUED | OUTPATIENT
Start: 2018-12-07 | End: 2018-12-09

## 2018-12-07 RX ORDER — INSULIN HUMAN 100 [IU]/ML
8 INJECTION, SOLUTION SUBCUTANEOUS ONCE
Qty: 0 | Refills: 0 | Status: COMPLETED | OUTPATIENT
Start: 2018-12-07 | End: 2018-12-07

## 2018-12-07 RX ORDER — SODIUM CHLORIDE 9 MG/ML
500 INJECTION INTRAMUSCULAR; INTRAVENOUS; SUBCUTANEOUS
Qty: 0 | Refills: 0 | Status: DISCONTINUED | OUTPATIENT
Start: 2018-12-07 | End: 2018-12-08

## 2018-12-07 RX ORDER — FUROSEMIDE 40 MG
20 TABLET ORAL DAILY
Qty: 0 | Refills: 0 | Status: DISCONTINUED | OUTPATIENT
Start: 2018-12-07 | End: 2018-12-07

## 2018-12-07 RX ADMIN — Medication 1334 MILLIGRAM(S): at 10:36

## 2018-12-07 RX ADMIN — HEPARIN SODIUM 5000 UNIT(S): 5000 INJECTION INTRAVENOUS; SUBCUTANEOUS at 13:55

## 2018-12-07 RX ADMIN — Medication 81 MILLIGRAM(S): at 10:37

## 2018-12-07 RX ADMIN — Medication 1334 MILLIGRAM(S): at 21:14

## 2018-12-07 RX ADMIN — SODIUM CHLORIDE 500 MILLILITER(S): 9 INJECTION INTRAMUSCULAR; INTRAVENOUS; SUBCUTANEOUS at 02:00

## 2018-12-07 RX ADMIN — FAMOTIDINE 20 MILLIGRAM(S): 10 INJECTION INTRAVENOUS at 10:37

## 2018-12-07 RX ADMIN — SODIUM CHLORIDE 500 MILLILITER(S): 9 INJECTION INTRAMUSCULAR; INTRAVENOUS; SUBCUTANEOUS at 01:09

## 2018-12-07 RX ADMIN — Medication 25 GRAM(S): at 00:39

## 2018-12-07 RX ADMIN — Medication 1 GRAM(S): at 01:30

## 2018-12-07 RX ADMIN — Medication 200 GRAM(S): at 00:39

## 2018-12-07 RX ADMIN — INSULIN HUMAN 8 UNIT(S): 100 INJECTION, SOLUTION SUBCUTANEOUS at 00:40

## 2018-12-07 RX ADMIN — HEPARIN SODIUM 5000 UNIT(S): 5000 INJECTION INTRAVENOUS; SUBCUTANEOUS at 21:14

## 2018-12-07 NOTE — H&P ADULT - NSHPREVIEWOFSYSTEMS_GEN_ALL_CORE
Review of Systems:   CONSTITUTIONAL: No fever, weight loss  EYES: No eye pain, visual disturbances, or discharge  ENMT:  No difficulty hearing, tinnitus, vertigo; No sinus or throat pain  RESPIRATORY: No SOB. No cough, wheezing, chills or hemoptysis  CARDIOVASCULAR: No chest pain, palpitations, dizziness, or leg swelling  GASTROINTESTINAL: +ABD PAIN, n/v. No hematemesis; No diarrhea or constipation. No melena or hematochezia.  GENITOURINARY: No dysuria, frequency, hematuria, or incontinence  NEUROLOGICAL: No headaches, memory loss, loss of strength, numbness, or tremors  SKIN: No itching, burning, rashes, or lesions   LYMPH NODES: No enlarged glands  ENDOCRINE: No heat or cold intolerance; No hair loss  MUSCULOSKELETAL: No joint pain or swelling; No muscle, back pain  PSYCHIATRIC: No depression, anxiety, mood swings, or difficulty sleeping

## 2018-12-07 NOTE — H&P ADULT - PROBLEM SELECTOR PLAN 3
likely viral gastroenteritis vs gastritis, now resolved  s/p ct-c/a/p neg for acute process etiology  - tylenol prn pain  - serial abd exams

## 2018-12-07 NOTE — H&P ADULT - PROBLEM SELECTOR PLAN 5
w/ hypotension on admission, now resolved  - restart home amlodipine 5 mg w/ hold parameters on non-hd days  - monitor bp

## 2018-12-07 NOTE — H&P ADULT - NSHPLABSRESULTS_GEN_ALL_CORE
LABS:                         16.7   6.5   )-----------( 170      ( 07 Dec 2018 03:50 )             46.2     12-07    131<L>  |  89<L>  |  32<H>  ----------------------------<  60<L>  4.7   |  26  |  5.15<H>    Ca    9.2      07 Dec 2018 02:50  Phos  2.8     12-06  Mg     2.4     12-06    TPro  9.1<H>  /  Alb  4.9  /  TBili  2.4<H>  /  DBili  x   /  AST  13  /  ALT  7<L>  /  AlkPhos  211<H>  12-06    PT/INR - ( 06 Dec 2018 23:14 )   PT: 12.9 sec;   INR: 1.12 ratio         PTT - ( 06 Dec 2018 23:14 )  PTT:40.4 sec    CARDIAC MARKERS ( 06 Dec 2018 23:14 )  x     / x     / 25 U/L / x     / x        Records reviewed from prior hospitalization for hyperkalemia as above.   EKG personally reviewed nsr, qtc 471  CTA chest/a/p reviewed L peritoneal hematoma decreased in size. no evidence of dissection.

## 2018-12-07 NOTE — ED ADULT NURSE NOTE - OBJECTIVE STATEMENT
58 year old female presents to the ED via triage walk in with c/o abdominal pain and nausea that started today approx 1730 after dialysis. Patient completed an entire 3 hour session of HD. Upon arrival to main ED, pt is lethargic and mildly dizzy with nausea with back to back and numbness/tingling in fingers and toes. Neuro status is in tact at this time. Pt says she still makes urine occasionally. Denies c/o CP, SOB, vomiting or diarrhea, blurry or double vision. Comfort and safety measures maintained.

## 2018-12-07 NOTE — ED ADULT NURSE NOTE - INTERVENTIONS DEFINITIONS
Physically safe environment: no spills, clutter or unnecessary equipment/[place] to call system/Call bell, personal items and telephone within reach/Stretcher in lowest position, wheels locked, appropriate side rails in place/Monitor for mental status changes and reorient to person, place, and time/Instruct patient to call for assistance/Review medications for side effects contributing to fall risk

## 2018-12-07 NOTE — CONSULT NOTE ADULT - PROBLEM SELECTOR RECOMMENDATION 2
Pt with hyperkalemia despite completing full session of HD yesterday. K+ wnl on last labs, drawn after medical treament (temporary shifting). Will order recirculation studies to r/o stenosis of AVF.  HD today with 2 K+ bath. Pt with hyperkalemia despite completing full session of HD yesterday. K+ wnl on last labs, drawn after medical treament (temporary shifting). Will order recirculation studies to r/o stenosis of AVF.  HD today with 2 K+ bath. Repeat BMP post HD.

## 2018-12-07 NOTE — H&P ADULT - PROBLEM SELECTOR PLAN 1
2/2 ESRD, EKG without peaked T waves, resolved after medical therapy  - completed full HD session on thursday 12/6  - d/w renal Dr De León - HD team to be made aware of admission  - plan for HD for tomorrow

## 2018-12-07 NOTE — H&P ADULT - PROBLEM SELECTOR PLAN 2
in setting of volume depletion, n/v  resolved s/p IVF boluses  - restart lasix tomorrow  - monitor bp  - if symptomatic or hypotension, can give additional ivf boluses in setting of volume depletion, n/v  resolved s/p IVF boluses  - restart lasix tomorrow  - monitor bp  - if symptomatic or hypotension, can give additional ivf boluses  - trend lactic acidosis likely 2/2 hypotention  - f/u bld cx drawn in the ED

## 2018-12-07 NOTE — CONSULT NOTE ADULT - PROBLEM SELECTOR RECOMMENDATION 9
Gets HD (TTS) at [facility] dialysis [place] via LUE AVF. Last HD 12/06. Pt clinically stable, labs with hyperkalemia s/p medical management. Schedule for HD today. Gets HD (TTS) at [facility] dialysis [place] via LUE AVF. Last HD 12/06. Pt clinically stable, labs with hyperkalemia s/p medical management. Schedule for HD today, will do a 2 hr session. Next HD in am .

## 2018-12-07 NOTE — CONSULT NOTE ADULT - SUBJECTIVE AND OBJECTIVE BOX
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 Pt found to have hypotension SBP ~80, labs with hyperkalemia (K+ 6.0). Pt s/i Iv fluids and medical management for hyperkalemia. Nephrology consulted for HD.  Pt known to nephrology service. Gets HD(TTS) in [facility] dialysis [place] via LUE AVF. Her nephrologist is Dr. De León. last HD session was yesterday. pt reprots completing session without any issues. Upon my eval, pt is comfortable, states pain has resolved.          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  Splenomegaly: 2015  Hypertension  Peptic ulcer disease  Polycythemia vera  AV fistula: Placed 9/18  Hemoperitoneum as complication of peritoneal dialysis: s/p removal 9/18  Peritoneal dialysis catheter in place: Placed 8/9/2017    FAMILY HISTORY:  Family history of malignant neoplasm (Grandparent): head and neck in father  Family history of hypertension in mother    PAST SOCIAL HISTORY:    ALLERGIES & MEDICATIONS  --------------------------------------------------------------------------------  Allergies    No Known Allergies    Intolerances      Standing Inpatient Medications  amLODIPine   Tablet 5 milliGRAM(s) Oral <User Schedule>  aspirin  chewable 81 milliGRAM(s) Oral daily  calcium acetate 1334 milliGRAM(s) Oral three times a day with meals  famotidine    Tablet 20 milliGRAM(s) Oral every 48 hours  heparin  Injectable 5000 Unit(s) SubCutaneous every 8 hours    PRN Inpatient Medications  acetaminophen   Tablet .. 650 milliGRAM(s) Oral every 6 hours PRN  ondansetron Injectable 4 milliGRAM(s) IV Push every 6 hours PRN      REVIEW OF SYSTEMS  --------------------------------------------------------------------------------  Gen: No  fevers/chills  Skin: No rashes  Head/Eyes/Ears/Mouth: No headache; Normal hearing; Normal vision w/o blurriness  Respiratory:  no dyspnea, No cough  CV: no chest pain no PND  GI: see HPI  : No increased frequency, dysuria, hematuria, nocturia  MSK: no edema    All other systems were reviewed and are negative, except as noted.    VITALS/PHYSICAL EXAM  --------------------------------------------------------------------------------  T(C): 36.5 (12-07-18 @ 10:52), Max: 36.7 (12-07-18 @ 00:49)  HR: 70 (12-07-18 @ 10:52) (66 - 82)  BP: 88/60 (12-07-18 @ 10:52) (85/63 - 112/78)  RR: 17 (12-07-18 @ 10:52) (16 - 20)  SpO2: 97% (12-07-18 @ 10:52) (96% - 99%)  Wt(kg): --  Height (cm): 162.56 (12-06-18 @ 19:16)  Weight (kg): 52.7 (12-06-18 @ 19:16)  BMI (kg/m2): 19.9 (12-06-18 @ 19:16)  BSA (m2): 1.55 (12-06-18 @ 19:16)      12-07-18 @ 07:01  -  12-07-18 @ 13:16  --------------------------------------------------------  IN: 0 mL / OUT: 0 mL / NET: 0 mL      Physical Exam:  	Gen: NAD, well-appearing  	HEENT: PERRL, supple neck  	Pulm: CTA B/L  	CV: RRR, S1S2; no rub  	Abd: +BS, soft, nontender/nondistended  	: No suprapubic tenderness  	UE: Warm, no edema; no asterixis  	LE: Warm,  no edema  	Skin: Warm, without rashes  	Vascular access: LUE AVF, thrill+ bruit+    LABS/STUDIES  --------------------------------------------------------------------------------              16.7   6.5   >-----------<  170      [12-07-18 @ 03:50]              46.2     131  |  89  |  32  ----------------------------<  60      [12-07-18 @ 02:50]  4.7   |  26  |  5.15        Ca     9.2     [12-07-18 @ 02:50]      Mg     2.4     [12-06-18 @ 23:14]      Phos  2.8     [12-06-18 @ 23:14]    TPro  9.1  /  Alb  4.9  /  TBili  2.4  /  DBili  x   /  AST  13  /  ALT  7   /  AlkPhos  211  [12-06-18 @ 23:14]    PT/INR: PT 12.9 , INR 1.12       [12-06-18 @ 23:14]  PTT: 40.4       [12-06-18 @ 23:14]    CK 25      [12-06-18 @ 23:14]    Creatinine Trend:  SCr 5.15 [12-07 @ 02:50]  SCr 4.95 [12-06 @ 23:14]    Urinalysis - [12-07-18 @ 12:41]      Color Yellow / Appearance Clear / SG 1.035 / pH 8.0      Gluc 100 mg/dL / Ketone Negative  / Bili Negative / Urobili Negative       Blood Negative / Protein 100 mg/dL / Leuk Est Negative / Nitrite Negative      RBC 2 / WBC 8 / Hyaline 3 / Gran  / Sq Epi  / Non Sq Epi 10 / Bacteria Many      Iron 26, TIBC 68, %sat 38      [03-14-18 @ 10:48]  Ferritin 122      [03-14-18 @ 09:37]  HbA1c 4.3      [03-11-18 @ 09:35]  TSH 1.68      [03-11-18 @ 09:33]  Lipid: chol 107, TG 64, HDL 39, LDL 55      [03-11-18 @ 09:47]    HBsAb <3.0      [03-13-18 @ 15:04]  HBsAb Nonreact      [01-19-17 @ 20:01]  HBsAg Nonreact      [05-05-18 @ 15:09]  HBcAb Nonreact      [03-13-18 @ 15:04]  HCV 0.21, Nonreact      [05-05-18 @ 15:09]  HIV Nonreact      [01-19-17 @ 20:01]    RAHUL: titer 1:80, pattern Homogeneous      [05-16-18 @ 22:43]  C3 Complement 100      [01-19-17 @ 20:01]  C4 Complement 32      [01-19-17 @ 20:01]  Rheumatoid Factor <7.0      [01-19-17 @ 20:01]  ANCA: cANCA Negative, pANCA Negative, atypical ANCA Negative      [01-19-17 @ 20:01]  anti-GBM <0.2      [01-20-17 @ 01:52]  ASLO 59      [01-19-17 @ 20:01]  Syphilis Screen (Treponema Pallidum Ab) Negative      [01-19-17 @ 20:01]  Free Light Chains: kappa 12.00, lambda 14.10, ratio = 0.85      [01-23 @ 14:39]  Immunofixation Serum:   No Monoclonal Band Identified      [01-23-17 @ 14:39]  SPEP Interpretation: Polyclonal Gammopathy      [01-23-17 @ 14:39]

## 2018-12-07 NOTE — ED ADULT NURSE REASSESSMENT NOTE - NS ED NURSE REASSESS COMMENT FT1
Pt noted to have low glucose levels on repeat BMP. Food and drink given to patient and consumed entirely. Repeat FS WNL, MD Libov aware. Pt not symptomatic at this time. Comfort and safety measures maintained.

## 2018-12-07 NOTE — H&P ADULT - NSHPPHYSICALEXAM_GEN_ALL_CORE
Vital Signs Last 24 Hrs  T(C): 36.7 (07 Dec 2018 06:21), Max: 36.7 (07 Dec 2018 00:49)  T(F): 98 (07 Dec 2018 06:21), Max: 98.1 (07 Dec 2018 03:53)  HR: 66 (07 Dec 2018 06:21) (66 - 82)  BP: 104/67 (07 Dec 2018 06:21) (85/63 - 112/78)  BP(mean): --  RR: 17 (07 Dec 2018 06:21) (16 - 20)  SpO2: 98% (07 Dec 2018 06:21) (96% - 99%)    PHYSICAL EXAM:  GENERAL:  Well appearing, in NAD  HEAD:  NCAT  EYES: PERRLA, conjunctiva clear  NECK: Supple, No JVD  CHEST/LUNG: CTA B/L. No w/r/r.  HEART: Reg rate. Normal S1, S2. No m/r/g.   ABDOMEN: SNTND. Bowel sounds present  EXTREMITIES:  2+ Peripheral Pulses, No clubbing, cyanosis, edema.  LUE w/ AV fistula w/ palpable thrill  PSYCH: AAOx3, appropriate affect  SKIN: No rashes or lesions

## 2018-12-07 NOTE — H&P ADULT - PMH
Cirrhosis of liver without ascites, unspecified hepatic cirrhosis type    ESRD on peritoneal dialysis    Hypertension    Pancreatic cyst    Peptic ulcer disease    Polycythemia vera    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  Splenomegaly  2015

## 2018-12-07 NOTE — H&P ADULT - ASSESSMENT
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

## 2018-12-07 NOTE — H&P ADULT - HISTORY OF PRESENT ILLNESS
58 y/o PMH F [place] 13 [place] w/ hx of GN w/ ESRD on HD (T/Th/Sa), hx of pancreatic cyst, polycythemia vera c/b splenomegaly and splenic vein thrombosis w/ infarct, cirrhosis w/ grade 1 gastric varices of unclear etiology, PUD/GERD, AV fistula thrombosis s/p thrombectomy, last admission 7/2018 for hyperkalemia p/w n/v/abd pain. Pt states she was in her USOH until yesterday, after HD, she experienced N and one ep of NBNB emesis followed by "stomach-ache" type pain throughout her abdomen. She denies any recent unusual food intake, f/chills, sick contact, cp, sob, cough, sore throat, urinary symptoms, or diarrhea. She states since admission in hospital, she now feels back to normal and without further abd pain.    In the ED, afebrile with SBP 80s  Meds received LR bolus 500 cc, NS bolus 500 cc, calcium gluconate 1 g, d5w amp, 8 u insulin, zofran 4 mg iv

## 2018-12-08 DIAGNOSIS — N25.0 RENAL OSTEODYSTROPHY: ICD-10-CM

## 2018-12-08 LAB
ANION GAP SERPL CALC-SCNC: 22 MMOL/L — HIGH (ref 5–17)
BASOPHILS # BLD AUTO: 0 K/UL — SIGNIFICANT CHANGE UP (ref 0–0.2)
BASOPHILS NFR BLD AUTO: 0.3 % — SIGNIFICANT CHANGE UP (ref 0–2)
BUN SERPL-MCNC: 24 MG/DL — HIGH (ref 7–23)
BUN SERPL-MCNC: 25 MG/DL — HIGH (ref 7–23)
BUN SERPL-MCNC: 4 MG/DL — LOW (ref 7–23)
BUN SERPL-MCNC: 48 MG/DL — HIGH (ref 7–23)
CALCIUM SERPL-MCNC: 8.7 MG/DL — SIGNIFICANT CHANGE UP (ref 8.4–10.5)
CHLORIDE SERPL-SCNC: 94 MMOL/L — LOW (ref 96–108)
CO2 SERPL-SCNC: 22 MMOL/L — SIGNIFICANT CHANGE UP (ref 22–31)
CREAT SERPL-MCNC: 5.67 MG/DL — HIGH (ref 0.5–1.3)
EOSINOPHIL # BLD AUTO: 0.1 K/UL — SIGNIFICANT CHANGE UP (ref 0–0.5)
EOSINOPHIL NFR BLD AUTO: 1.4 % — SIGNIFICANT CHANGE UP (ref 0–6)
GAS PNL BLDV: SIGNIFICANT CHANGE UP
GLUCOSE SERPL-MCNC: 142 MG/DL — HIGH (ref 70–99)
HCT VFR BLD CALC: 42.9 % — SIGNIFICANT CHANGE UP (ref 34.5–45)
HGB BLD-MCNC: 14.2 G/DL — SIGNIFICANT CHANGE UP (ref 11.5–15.5)
LACTATE SERPL-SCNC: 1.7 MMOL/L — SIGNIFICANT CHANGE UP (ref 0.7–2)
LYMPHOCYTES # BLD AUTO: 0.8 K/UL — LOW (ref 1–3.3)
LYMPHOCYTES # BLD AUTO: 18.2 % — SIGNIFICANT CHANGE UP (ref 13–44)
MAGNESIUM SERPL-MCNC: 2.3 MG/DL — SIGNIFICANT CHANGE UP (ref 1.6–2.6)
MCHC RBC-ENTMCNC: 31.1 PG — SIGNIFICANT CHANGE UP (ref 27–34)
MCHC RBC-ENTMCNC: 33.2 GM/DL — SIGNIFICANT CHANGE UP (ref 32–36)
MCV RBC AUTO: 93.9 FL — SIGNIFICANT CHANGE UP (ref 80–100)
MONOCYTES # BLD AUTO: 0.2 K/UL — SIGNIFICANT CHANGE UP (ref 0–0.9)
MONOCYTES NFR BLD AUTO: 4.4 % — SIGNIFICANT CHANGE UP (ref 2–14)
NEUTROPHILS # BLD AUTO: 3.4 K/UL — SIGNIFICANT CHANGE UP (ref 1.8–7.4)
NEUTROPHILS NFR BLD AUTO: 75.7 % — SIGNIFICANT CHANGE UP (ref 43–77)
PHOSPHATE SERPL-MCNC: 5.2 MG/DL — HIGH (ref 2.5–4.5)
PLATELET # BLD AUTO: 125 K/UL — LOW (ref 150–400)
POTASSIUM SERPL-MCNC: 4.9 MMOL/L — SIGNIFICANT CHANGE UP (ref 3.5–5.3)
POTASSIUM SERPL-SCNC: 4.9 MMOL/L — SIGNIFICANT CHANGE UP (ref 3.5–5.3)
RBC # BLD: 4.57 M/UL — SIGNIFICANT CHANGE UP (ref 3.8–5.2)
RBC # FLD: 19.5 % — HIGH (ref 10.3–14.5)
SODIUM SERPL-SCNC: 138 MMOL/L — SIGNIFICANT CHANGE UP (ref 135–145)
WBC # BLD: 4.5 K/UL — SIGNIFICANT CHANGE UP (ref 3.8–10.5)
WBC # FLD AUTO: 4.5 K/UL — SIGNIFICANT CHANGE UP (ref 3.8–10.5)

## 2018-12-08 PROCEDURE — 99232 SBSQ HOSP IP/OBS MODERATE 35: CPT | Mod: GC

## 2018-12-08 PROCEDURE — 99233 SBSQ HOSP IP/OBS HIGH 50: CPT

## 2018-12-08 RX ADMIN — HEPARIN SODIUM 5000 UNIT(S): 5000 INJECTION INTRAVENOUS; SUBCUTANEOUS at 21:29

## 2018-12-08 RX ADMIN — Medication 1334 MILLIGRAM(S): at 09:19

## 2018-12-08 RX ADMIN — Medication 1334 MILLIGRAM(S): at 13:44

## 2018-12-08 RX ADMIN — HYDROXYUREA 500 MILLIGRAM(S): 500 CAPSULE ORAL at 23:29

## 2018-12-08 RX ADMIN — HEPARIN SODIUM 5000 UNIT(S): 5000 INJECTION INTRAVENOUS; SUBCUTANEOUS at 05:36

## 2018-12-08 NOTE — PROGRESS NOTE ADULT - SUBJECTIVE AND OBJECTIVE BOX
Pt seen and examined at bedside by Dr. Leonor Singh -- Pager [phone]    Patient is a 58y old  Female who presents with a chief complaint of n/v/abd pain (08 Dec 2018 11:25)    SUBJECTIVE / OVERNIGHT EVENTS:  Pt feels well; Her vomiting has resolved, however, did feel slightly nauseous after breakfast this morning.  Pt feels ready to be discharged, however needs HD here prior.  She is ambulating, and tolerating PO.  No changes in bowel habits.     REVIEW OF SYSTEMS:    CONSTITUTIONAL: No weakness, fevers or chills  EYES/ENT: No visual changes;  No vertigo or throat pain   NECK: No pain or stiffness  RESPIRATORY: No cough, wheezing, hemoptysis; No shortness of breath  CARDIOVASCULAR: No chest pain or palpitations  GASTROINTESTINAL: No abdominal or epigastric pain. No nausea, vomiting, or hematemesis; No diarrhea or constipation. No melena or hematochezia.  GENITOURINARY: No dysuria, frequency or hematuria  NEUROLOGICAL: No numbness or weakness  SKIN: No itching, rashes  EXT: No edema, deformities, or weakness  HEME/ONC: No bleeding, no weight loss, no lymphadenopathy, no night sweats.     MEDICATIONS  (STANDING):  amLODIPine   Tablet 5 milliGRAM(s) Oral <User Schedule>  aspirin  chewable 81 milliGRAM(s) Oral daily  calcium acetate 1334 milliGRAM(s) Oral three times a day with meals  famotidine    Tablet 20 milliGRAM(s) Oral every 48 hours  furosemide    Tablet 60 milliGRAM(s) Oral daily  heparin  Injectable 5000 Unit(s) SubCutaneous every 8 hours  hydroxyurea 500 milliGRAM(s) Oral <User Schedule>    MEDICATIONS  (PRN):  acetaminophen   Tablet .. 650 milliGRAM(s) Oral every 6 hours PRN Moderate Pain (4 - 6)  ondansetron Injectable 4 milliGRAM(s) IV Push every 6 hours PRN Nausea      Vital Signs Last 24 Hrs  T(C): 36.5 (08 Dec 2018 09:13), Max: 36.8 (07 Dec 2018 13:19)  T(F): 97.7 (08 Dec 2018 09:13), Max: 98.3 (07 Dec 2018 13:19)  HR: 73 (08 Dec 2018 09:13) (61 - 88)  BP: 104/72 (08 Dec 2018 09:13) (97/67 - 104/72)  BP(mean): --  RR: 18 (08 Dec 2018 09:13) (14 - 18)  SpO2: 96% (08 Dec 2018 09:13) (95% - 100%)  CAPILLARY BLOOD GLUCOSE        I&O's Summary    07 Dec 2018 07:01  -  08 Dec 2018 07:00  --------------------------------------------------------  IN: 480 mL / OUT: 150 mL / NET: 330 mL          PHYSICAL EXAM  GENERAL: NAD, well-developed  HEAD:  Atraumatic, Normocephalic  EYES: EOMI, PERRLA, conjunctiva and sclera clear  NECK: Supple, No JVD  CHEST/LUNG: Clear to auscultation bilaterally; No wheeze  HEART: Regular rate and rhythm; No murmurs, rubs, or gallops  ABDOMEN: Soft, Nontender, Nondistended; Bowel sounds present  EXTREMITIES:  2+ Peripheral Pulses, No clubbing, cyanosis; No edema  PSYCH: AAOx3  SKIN: No rashes or lesions  NEURO: 5/5 Muscle strength x4.  Sensation intact. CN intact.  No gait disturbance.     LABS:                        16.7   6.5   )-----------( 170      ( 07 Dec 2018 03:50 )             46.2     12-    131<L>  |  89<L>  |  32<H>  ----------------------------<  60<L>  4.7   |  26  |  5.15<H>    Ca    9.2      07 Dec 2018 02:50  Phos  2.8     12-  Mg     2.4     12-    TPro  9.1<H>  /  Alb  4.9  /  TBili  2.4<H>  /  DBili  x   /  AST  13  /  ALT  7<L>  /  AlkPhos  211<H>  12-06    PT/INR - ( 06 Dec 2018 23:14 )   PT: 12.9 sec;   INR: 1.12 ratio         PTT - ( 06 Dec 2018 23:14 )  PTT:40.4 sec  CARDIAC MARKERS ( 06 Dec 2018 23:14 )  x     / x     / 25 U/L / x     / x          Urinalysis Basic - ( 07 Dec 2018 12:41 )    Color: Yellow / Appearance: Clear / S.035 / pH: x  Gluc: x / Ketone: Negative  / Bili: Negative / Urobili: Negative   Blood: x / Protein: 100 mg/dL / Nitrite: Negative   Leuk Esterase: Negative / RBC: 2 /hpf / WBC 8 /hpf   Sq Epi: x / Non Sq Epi: 10 /hpf / Bacteria: Many        RADIOLOGY & ADDITIONAL TESTS:    Imaging Personally Reviewed:  < from: CT Angio Abdomen and Pelvis w/ IV Cont (18 @ 23:33) >  IMPRESSION: No aortic dissection.    < end of copied text >  Consultant(s) Notes Reviewed:  Neprho  Care Discussed with Consultants/Other Providers: JONEL garcia

## 2018-12-08 NOTE — PROGRESS NOTE ADULT - SUBJECTIVE AND OBJECTIVE BOX
[facility] DIVISION OF KIDNEY DISEASES AND HYPERTENSION -- FOLLOW UP NOTE  --------------------------------------------------------------------------------  Chief Complaint:  ESRD on HD 2/2 chronic GN (previously on PD)    24 hour events/subjective:    Pt examined at bed side, she states feeling better, denies n/v or abdominal pain. Will go for HD today.     PAST HISTORY  --------------------------------------------------------------------------------  No significant changes to PMH, PSH, FHx, SHx, unless otherwise noted    ALLERGIES & MEDICATIONS  --------------------------------------------------------------------------------  Allergies    No Known Allergies    Intolerances      Standing Inpatient Medications  amLODIPine   Tablet 5 milliGRAM(s) Oral <User Schedule>  aspirin  chewable 81 milliGRAM(s) Oral daily  calcium acetate 1334 milliGRAM(s) Oral three times a day with meals  famotidine    Tablet 20 milliGRAM(s) Oral every 48 hours  furosemide    Tablet 60 milliGRAM(s) Oral daily  heparin  Injectable 5000 Unit(s) SubCutaneous every 8 hours  hydroxyurea 500 milliGRAM(s) Oral <User Schedule>    PRN Inpatient Medications  acetaminophen   Tablet .. 650 milliGRAM(s) Oral every 6 hours PRN  ondansetron Injectable 4 milliGRAM(s) IV Push every 6 hours PRN      REVIEW OF SYSTEMS  --------------------------------------------------------------------------------  Gen: No  fevers/chills  Skin: No rashes  Head/Eyes/Ears/Mouth: No headache; Normal hearing; Normal vision w/o blurriness  Respiratory:  no dyspnea, No cough  CV: no chest pain no PND  GI: see HPI  : No increased frequency, dysuria, hematuria, nocturia  MSK: no edema    All other systems were reviewed and are negative, except as noted.    VITALS/PHYSICAL EXAM  --------------------------------------------------------------------------------  T(C): 36.5 (12-08-18 @ 09:13), Max: 36.8 (12-07-18 @ 13:19)  HR: 73 (12-08-18 @ 09:13) (61 - 88)  BP: 104/72 (12-08-18 @ 09:13) (97/67 - 104/72)  RR: 18 (12-08-18 @ 09:13) (14 - 18)  SpO2: 96% (12-08-18 @ 09:13) (95% - 100%)  Wt(kg): --  Height (cm): 162.56 (12-06-18 @ 19:16)  Weight (kg): 52.7 (12-06-18 @ 19:16)  BMI (kg/m2): 19.9 (12-06-18 @ 19:16)  BSA (m2): 1.55 (12-06-18 @ 19:16)      12-07-18 @ 07:01  -  12-08-18 @ 07:00  --------------------------------------------------------  IN: 480 mL / OUT: 150 mL / NET: 330 mL      Physical Exam:  	Gen: NAD, well-appearing  	HEENT: PERRL, supple neck  	Pulm: CTA B/L  	CV: RRR, S1S2; no rub  	Abd: +BS, soft, nontender/nondistended  	: No suprapubic tenderness  	UE: Warm, no edema; no asterixis  	LE: Warm,  no edema  	Skin: Warm, without rashes  	Vascular access: LUE AVF, thrill+ bruit+    LABS/STUDIES  --------------------------------------------------------------------------------              16.7   6.5   >-----------<  170      [12-07-18 @ 03:50]              46.2     131  |  89  |  32  ----------------------------<  60      [12-07-18 @ 02:50]  4.7   |  26  |  5.15        Ca     9.2     [12-07-18 @ 02:50]      Mg     2.4     [12-06-18 @ 23:14]      Phos  2.8     [12-06-18 @ 23:14]    TPro  9.1  /  Alb  4.9  /  TBili  2.4  /  DBili  x   /  AST  13  /  ALT  7   /  AlkPhos  211  [12-06-18 @ 23:14]    PT/INR: PT 12.9 , INR 1.12       [12-06-18 @ 23:14]  PTT: 40.4       [12-06-18 @ 23:14]    CK 25      [12-06-18 @ 23:14]    Creatinine Trend:  SCr 5.15 [12-07 @ 02:50]  SCr 4.95 [12-06 @ 23:14]

## 2018-12-09 ENCOUNTER — TRANSCRIPTION ENCOUNTER (OUTPATIENT)
Age: 58
End: 2018-12-09

## 2018-12-09 VITALS
TEMPERATURE: 99 F | HEART RATE: 75 BPM | RESPIRATION RATE: 16 BRPM | SYSTOLIC BLOOD PRESSURE: 100 MMHG | OXYGEN SATURATION: 98 % | DIASTOLIC BLOOD PRESSURE: 70 MMHG

## 2018-12-09 LAB
ANION GAP SERPL CALC-SCNC: 17 MMOL/L — SIGNIFICANT CHANGE UP (ref 5–17)
BUN SERPL-MCNC: 37 MG/DL — HIGH (ref 7–23)
CALCIUM SERPL-MCNC: 9.2 MG/DL — SIGNIFICANT CHANGE UP (ref 8.4–10.5)
CHLORIDE SERPL-SCNC: 90 MMOL/L — LOW (ref 96–108)
CO2 SERPL-SCNC: 26 MMOL/L — SIGNIFICANT CHANGE UP (ref 22–31)
CREAT SERPL-MCNC: 4.75 MG/DL — HIGH (ref 0.5–1.3)
GLUCOSE SERPL-MCNC: 104 MG/DL — HIGH (ref 70–99)
HCT VFR BLD CALC: 47.2 % — HIGH (ref 34.5–45)
HGB BLD-MCNC: 16 G/DL — HIGH (ref 11.5–15.5)
MCHC RBC-ENTMCNC: 31.4 PG — SIGNIFICANT CHANGE UP (ref 27–34)
MCHC RBC-ENTMCNC: 33.9 GM/DL — SIGNIFICANT CHANGE UP (ref 32–36)
MCV RBC AUTO: 92.6 FL — SIGNIFICANT CHANGE UP (ref 80–100)
PLATELET # BLD AUTO: 176 K/UL — SIGNIFICANT CHANGE UP (ref 150–400)
POTASSIUM SERPL-MCNC: 4.5 MMOL/L — SIGNIFICANT CHANGE UP (ref 3.5–5.3)
POTASSIUM SERPL-SCNC: 4.5 MMOL/L — SIGNIFICANT CHANGE UP (ref 3.5–5.3)
RBC # BLD: 5.09 M/UL — SIGNIFICANT CHANGE UP (ref 3.8–5.2)
RBC # FLD: 19.2 % — HIGH (ref 10.3–14.5)
SODIUM SERPL-SCNC: 133 MMOL/L — LOW (ref 135–145)
WBC # BLD: 7.6 K/UL — SIGNIFICANT CHANGE UP (ref 3.8–10.5)
WBC # FLD AUTO: 7.6 K/UL — SIGNIFICANT CHANGE UP (ref 3.8–10.5)

## 2018-12-09 PROCEDURE — 99239 HOSP IP/OBS DSCHRG MGMT >30: CPT

## 2018-12-09 RX ORDER — SENNA PLUS 8.6 MG/1
1 TABLET ORAL ONCE
Qty: 0 | Refills: 0 | Status: COMPLETED | OUTPATIENT
Start: 2018-12-09 | End: 2018-12-09

## 2018-12-09 RX ADMIN — SENNA PLUS 1 TABLET(S): 8.6 TABLET ORAL at 12:31

## 2018-12-09 RX ADMIN — HEPARIN SODIUM 5000 UNIT(S): 5000 INJECTION INTRAVENOUS; SUBCUTANEOUS at 12:31

## 2018-12-09 RX ADMIN — Medication 1334 MILLIGRAM(S): at 12:31

## 2018-12-09 RX ADMIN — FAMOTIDINE 20 MILLIGRAM(S): 10 INJECTION INTRAVENOUS at 08:18

## 2018-12-09 RX ADMIN — Medication 1334 MILLIGRAM(S): at 08:16

## 2018-12-09 RX ADMIN — HEPARIN SODIUM 5000 UNIT(S): 5000 INJECTION INTRAVENOUS; SUBCUTANEOUS at 05:23

## 2018-12-09 RX ADMIN — Medication 60 MILLIGRAM(S): at 05:23

## 2018-12-09 RX ADMIN — Medication 81 MILLIGRAM(S): at 12:31

## 2018-12-09 NOTE — DISCHARGE NOTE ADULT - SECONDARY DIAGNOSIS.
Generalized abdominal pain Hypotension, unspecified hypotension type Polycythemia vera ESRD (end stage renal disease) on dialysis

## 2018-12-09 NOTE — PROGRESS NOTE ADULT - PROBLEM SELECTOR PLAN 5
w/ hypotension on admission, now resolved  - restart home amlodipine 5 mg w/ hold parameters on non-hd days  - monitor bp
w/ hypotension on admission, now resolved  - restart home Amlodipine 5 mg w/ hold parameters on non-hd days  - monitor hemodynamics as above

## 2018-12-09 NOTE — DISCHARGE NOTE ADULT - CARE PROVIDER_API CALL
PMD as sson as possible,   Phone: (   )    -  Fax: (   )    -    Nephrologist,   Phone: (   )    -  Fax: (   )    - PMD as sson as possible,   Phone: (   )    -  Fax: (   )    -    Nephrologist,   Phone: (   )    -  Fax: (   )    -    Hematologist,   Phone: (   )    -  Fax: (   )    -

## 2018-12-09 NOTE — PROGRESS NOTE ADULT - ATTENDING COMMENTS
Tolerating PD-->HD  1.  ESRD--HD TIW  2.  Hypertension--volume, med optimization  3.  Renal osteodystrophy--Vit D
Leonor Singh,   Medicine Hospitalist  Pager - [phone]
Patient stable and amenable for discharge home. Total discharge planning time spent = 35minutes.

## 2018-12-09 NOTE — DISCHARGE NOTE ADULT - HOSPITAL COURSE
56 y/o PMH F [place] 13 [place] w/ hx of GN w/ ESRD on HD (T/Th/Sa), hx of pancreatic cyst, polycythemia vera c/b splenomegaly and splenic vein thrombosis w/ infarct, cirrhosis w/ grade 1 gastric varices of unclear etiology, PUD/GERD, AV fistula thrombosis s/p thrombectomy, last admission 7/2018 for hyperkalemia p/w n/v/abd pain. Pt states she was in her USOH until yesterday, after HD, she experienced N and one ep of NBNB emesis followed by "stomach-ache" type pain throughout her abdomen. She denies any recent unusual food intake, f/chills, sick contact, cp, sob, cough, sore throat, urinary symptoms, or diarrhea. She states since admission in hospital, she now feels back to normal and without further abd pain.    In the ED, afebrile with SBP 80s  Meds received LR bolus 500 cc, NS bolus 500 cc, calcium gluconate 1 g, d5w amp, 8 u insulin, zofran 4 mg iv    - Hyperkalemia 2/2 ESRD, EKG without peaked T waves, resolved after medical therapy and HD, last prior to discharge on 12/8  - Hypotension, unspecified hypotension type in setting of volume depletion, n/v, resolved s/p IVF boluses, restart Lasix after HD  - Generalized abdominal pain  likely viral gastroenteritis vs gastritis, now resolved; CT C/A/P neg for acute process etiology

## 2018-12-09 NOTE — PROGRESS NOTE ADULT - PROBLEM SELECTOR PROBLEM 2
Essential hypertension
Hypotension, unspecified hypotension type
Hypotension, unspecified hypotension type

## 2018-12-09 NOTE — DISCHARGE NOTE ADULT - CARE PLAN
Principal Discharge DX:	Hyperkalemia  Goal:	RESOLVED  Assessment and plan of treatment:	- completed full HD session on  12/6 and 12/9  Secondary Diagnosis:	Generalized abdominal pain  Assessment and plan of treatment:	likely viral gastroenteritis vs gastritis, now resolved   ct-c/abdomen and pelvis negative  for acute process etiology   resolved pain now.  Secondary Diagnosis:	Hypotension, unspecified hypotension type  Assessment and plan of treatment:	unspecified hypotension type.  in setting of volume depletion, nausea and vomiting  resolved s/p IVF boluses  - held lasix , will restart after HD per Neprhology       blood pressure is acceptable now  Secondary Diagnosis:	Polycythemia vera  Assessment and plan of treatment:	- continue home hydroxyurea w/ Hemodialysis  Secondary Diagnosis:	ESRD (end stage renal disease) on dialysis  Assessment and plan of treatment:	continue Hemodialysis Tues Thurs Sat schedule

## 2018-12-09 NOTE — DISCHARGE NOTE ADULT - PATIENT PORTAL LINK FT
You can access the [facility] Patient Portal, offered by [facility], by registering with the following website: [email or web address]

## 2018-12-09 NOTE — PROGRESS NOTE ADULT - PROBLEM SELECTOR PLAN 1
2/2 ESRD, EKG without peaked T waves, resolved after medical therapy  - completed full HD session on thursday 12/6  - d/w renal Dr De León - HD team to be made aware of admission  - plan for HD for today
Gets HD (TTS) at [facility] dialysis [place] via LUE AVF. Last HD 12/06. Pt clinically stable, labs with hyperkalemia s/p medical management. Schedule for HD today, will do a 2 hr session. Next HD today.
2/2 ESRD, EKG without peaked T waves, resolved after medical therapy  - completed full HD session on Thursday 12/6, and prior to D/C on Saturday, 12/8  - d/w renal Dr De León

## 2018-12-09 NOTE — DISCHARGE NOTE ADULT - PROVIDER TOKENS
FREE:[LAST:[PMD as sson as possible],PHONE:[(   )    -],FAX:[(   )    -]],FREE:[LAST:[Nephrologist],PHONE:[(   )    -],FAX:[(   )    -]] FREE:[LAST:[PMD as sson as possible],PHONE:[(   )    -],FAX:[(   )    -]],FREE:[LAST:[Nephrologist],PHONE:[(   )    -],FAX:[(   )    -]],FREE:[LAST:[Hematologist],PHONE:[(   )    -],FAX:[(   )    -]]

## 2018-12-09 NOTE — PROGRESS NOTE ADULT - PROBLEM SELECTOR PLAN 4
on HD t/th/sat  - renal aware, plan for HD today
on HD Tu/Th/Sa  - case management aware to have home HD re-instated for next session on Tues, 12/11

## 2018-12-09 NOTE — PROGRESS NOTE ADULT - PROBLEM SELECTOR PLAN 2
BP stable continue current meds.   Suggest to hold amlodipine dialysis day.
in setting of volume depletion, n/v  resolved s/p IVF boluses  - holding lasix for now, restart after HD per Neprhology recs  - monitor bp  - if symptomatic or hypotension, can give additional ivf boluses  - trend lactic acidosis likely 2/2 hypotension  - f/u bld cx drawn in the ED
in setting of volume depletion, n/v; resolved s/p IVF boluses  - Lasix restarted after HD per Nephrology recs  - monitor hemodynamics closely

## 2018-12-09 NOTE — PROGRESS NOTE ADULT - PROBLEM SELECTOR PLAN 3
Continue Hecterol 2 mcg  IVP tiw with HD.   Continue home dose of binders.  Monitor Ca++ and phos.
likely viral gastroenteritis vs gastritis, now resolved  s/p ct-c/a/p neg for acute process etiology  - tylenol prn pain  - resolved pain now
likely viral gastroenteritis vs gastritis, now resolved; CT C/A/P neg for acute process etiology  - Tylenol PRN for pain

## 2018-12-09 NOTE — PROGRESS NOTE ADULT - SUBJECTIVE AND OBJECTIVE BOX
Patient is a 58y old  Female who presents with a chief complaint of n/v/abd pain (09 Dec 2018 11:53)    SUBJECTIVE / OVERNIGHT EVENTS: Patient seen and examined. No acute overnight events, no subjective complaints. She is eager to be discharged home tonight. She states that she has not had a BM for the past 3days, but only requests Senna at this time as this "usually makes [her] go right away."    OBJECTIVE:  Vital Signs Last 24 Hrs  T(F): 98.1 (09 Dec 2018 08:57), Max: 98.8 (08 Dec 2018 16:10)  HR: 83 (09 Dec 2018 08:57) (75 - 98)  BP: 101/68 (09 Dec 2018 08:57) (96/65 - 125/86)  RR: 18 (09 Dec 2018 08:57) (18 - 18)  SpO2: 97% (09 Dec 2018 08:57) (93% - 98%)    I&O's Summary  09 Dec 2018 07:01  -  09 Dec 2018 13:11  --------------------------------------------------------  IN: 320 mL / OUT: 0 mL / NET: 320 mL    Physical Examination:  GEN: middle aged woman, laying in bed in NAD  PSYCH: A&Ox3, mood and affect appear appropriate  NECK: supple  RESPI: no accessory muscle use, B/L air entry, CTAB   CARDIO: regular rate/rhythm, no LE edema B/L  ABD: soft, NT, ND, +BS  EXT: patient able to move all extremities spontaneously  VASC: peripheral pulses palpated    Labs:                     16.0   7.6   )-----------( 176      ( 09 Dec 2018 10:52 )             47.2     12-09  133<L>  |  90<L>  |  37<H>  ----------------------------<  104<H>  4.5   |  26  |  4.75<H>    Ca    9.2      09 Dec 2018 10:52    Culture - Blood (collected 07 Dec 2018 08:13)  Source: .Blood Blood-Venous  Preliminary Report (08 Dec 2018 09:01):    No growth to date.    Culture - Blood (collected 07 Dec 2018 08:13)  Source: .Blood Blood  Preliminary Report (08 Dec 2018 09:01):    No growth to date.    Consultant(s) Notes Reviewed: Nephrology  Care Discussed with Consultants/Other Providers: NP Ritti    MEDICATIONS  (STANDING):  amLODIPine   Tablet 5 milliGRAM(s) Oral <User Schedule>  aspirin  chewable 81 milliGRAM(s) Oral daily  calcium acetate 1334 milliGRAM(s) Oral three times a day with meals  famotidine    Tablet 20 milliGRAM(s) Oral every 48 hours  furosemide    Tablet 60 milliGRAM(s) Oral daily  heparin  Injectable 5000 Unit(s) SubCutaneous every 8 hours  hydroxyurea 500 milliGRAM(s) Oral <User Schedule>    MEDICATIONS  (PRN):  acetaminophen   Tablet .. 650 milliGRAM(s) Oral every 6 hours PRN Moderate Pain (4 - 6)  ondansetron Injectable 4 milliGRAM(s) IV Push every 6 hours PRN Nausea

## 2018-12-09 NOTE — DISCHARGE NOTE ADULT - MEDICATION SUMMARY - MEDICATIONS TO TAKE
I will START or STAY ON the medications listed below when I get home from the hospital:    aspirin 81 mg oral tablet, chewable  -- 1 tab(s) by mouth once a day  -- Indication: For Prophylaxis    hydroxyurea 500 mg oral capsule  -- 1 cap(s) by mouth Tuesday, Thursday, Saturday . Please take it on your Dialysis days only, after getting your dialysis treatment.   -- Indication: For Polycythemia vera    amLODIPine 5 mg oral tablet  -- 1 tab(s) by mouth on non HD days  -- Indication: For Hypotension, unspecified hypotension type    furosemide 20 mg oral tablet  -- 3 tab(s) by mouth once a day  -- Indication: For fluid overload    famotidine 20 mg oral tablet  -- 1 tab(s) by mouth 2 times a day  -- Indication: For GeRD    calcium acetate 667 mg oral capsule  -- 2 tab(s) by mouth 3 times a day  -- Indication: For ESRD (end stage renal disease) on dialysis I will START or STAY ON the medications listed below when I get home from the hospital:    aspirin 81 mg oral tablet, chewable  -- 1 tab(s) by mouth once a day  -- Indication: For Prophylaxis    hydroxyurea 500 mg oral capsule  -- 1 cap(s) by mouth Tuesday, Thursday, Saturday . Please take it on your Dialysis days only, after getting your dialysis treatment.   -- Indication: For Polycythemia vera    amLODIPine 5 mg oral tablet  -- 1 tab(s) by mouth on non HD days  -- Indication: For Essential hypertension    furosemide 20 mg oral tablet  -- 3 tab(s) by mouth once a day  -- Indication: For fluid overload    famotidine 20 mg oral tablet  -- 1 tab(s) by mouth 2 times a day  -- Indication: For Gerd    calcium acetate 667 mg oral capsule  -- 2 tab(s) by mouth 3 times a day  -- Indication: For High phosphorus level

## 2018-12-09 NOTE — DISCHARGE NOTE ADULT - PLAN OF CARE
RESOLVED - completed full HD session on  12/6 and 12/9 likely viral gastroenteritis vs gastritis, now resolved   ct-c/abdomen and pelvis negative  for acute process etiology   resolved pain now. unspecified hypotension type.  in setting of volume depletion, nausea and vomiting  resolved s/p IVF boluses  - held lasix , will restart after HD per Neprhology       blood pressure is acceptable now - continue home hydroxyurea w/ Hemodialysis continue Hemodialysis Tues Thurs Sat schedule

## 2018-12-09 NOTE — PROGRESS NOTE ADULT - PROBLEM SELECTOR PLAN 8
DVT ppx: hsq  no PT/OT needs  dispo: likely d/c home today after HD session  DC planning took 30 mins
DVT ppx: hsq  no PT/OT needs

## 2018-12-09 NOTE — PROGRESS NOTE ADULT - ASSESSMENT
57 year old woman with ESRD on HD presenting with n/v/ abdominal pain.
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
57yoF w/ PMHx significant for GN w/ ESRD on HD (T/Th/Sa), hx of pancreatic cyst, polycythemia vera c/b splenomegaly and splenic vein thrombosis w/ infarct, cirrhosis w/ grade 1 gastric varices of unclear etiology, PUD/GERD, AV fistula thrombosis s/p thrombectomy, last admission 7/2018 for hyperkalemia p/w n/v/abd pain found to have hyperkalemia, hypotension, now resolved.

## 2018-12-11 ENCOUNTER — FORM ENCOUNTER (OUTPATIENT)
Age: 58
End: 2018-12-11

## 2018-12-11 PROBLEM — Z87.19 HISTORY OF CIRRHOSIS: Status: RESOLVED | Noted: 2018-12-11 | Resolved: 2018-12-11

## 2018-12-11 PROBLEM — D73.5 SPLENIC INFARCT: Status: RESOLVED | Noted: 2018-12-11 | Resolved: 2018-12-11

## 2018-12-11 PROBLEM — K86.2 PANCREATIC CYST: Status: RESOLVED | Noted: 2018-12-11 | Resolved: 2018-12-11

## 2018-12-11 PROBLEM — Z87.11 HISTORY OF PEPTIC ULCER: Status: RESOLVED | Noted: 2018-12-11 | Resolved: 2018-12-11

## 2018-12-12 ENCOUNTER — APPOINTMENT (OUTPATIENT)
Dept: ENDOVASCULAR SURGERY | Facility: CLINIC | Age: 58
End: 2018-12-12
Payer: MEDICAID

## 2018-12-12 DIAGNOSIS — Z87.11 PERSONAL HISTORY OF PEPTIC ULCER DISEASE: ICD-10-CM

## 2018-12-12 DIAGNOSIS — Z87.19 PERSONAL HISTORY OF OTHER DISEASES OF THE DIGESTIVE SYSTEM: ICD-10-CM

## 2018-12-12 DIAGNOSIS — D73.5 INFARCTION OF SPLEEN: ICD-10-CM

## 2018-12-12 DIAGNOSIS — T82.838A HEMORRHAGE DUE VASCULAR PROSTHETIC DEVICES, IMPLANTS AND GRAFTS, INITIAL ENCOUNTER: ICD-10-CM

## 2018-12-12 DIAGNOSIS — K86.2 CYST OF PANCREAS: ICD-10-CM

## 2018-12-12 PROCEDURE — 36907Z: CUSTOM | Mod: 59

## 2018-12-12 PROCEDURE — 36902Z: CUSTOM

## 2018-12-12 PROCEDURE — 36215Z: CUSTOM | Mod: 59

## 2018-12-12 RX ORDER — FAMOTIDINE 20 MG/1
20 TABLET, FILM COATED ORAL TWICE DAILY
Qty: 60 | Refills: 5 | Status: COMPLETED | COMMUNITY
Start: 2018-08-21 | End: 2018-12-12

## 2018-12-12 RX ORDER — CLOPIDOGREL BISULFATE 75 MG/1
75 TABLET, FILM COATED ORAL DAILY
Qty: 90 | Refills: 3 | Status: DISCONTINUED | COMMUNITY
Start: 2018-08-01 | End: 2018-12-12

## 2018-12-12 RX ORDER — CLOPIDOGREL 75 MG/1
75 TABLET, FILM COATED ORAL
Refills: 0 | Status: DISCONTINUED | COMMUNITY
End: 2018-12-12

## 2018-12-12 NOTE — DISCUSSION/SUMMARY
[FreeTextEntry1] : 58 y/o PMH F [place] in [place] ([place] 13 [place] w/ hx of GN w/ ESRD on HD (T/Th/Sa), hx of pancreatic cyst, polycythemia vera c/b splenomegaly and splenic vein thrombosis w/ infarct, cirrhosis w/ grade 1 gastric varices of unclear etiology, PUD/GERD, AV fistula thrombosis s/p thrombectomy, last admission 7/2018 for hyperkalemia p/w n/v/abd pain. Pt states she was in her USOH until yesterday, after HD, she experienced N and one ep of NBNB emesis followed by "stomach-ache" type pain throughout her abdomen. She denies any recent unusual food intake, f/chills, sick contact, cp, sob, cough, sore throat, urinary symptoms, or diarrhea. She states since admission in hospital, she now feels back to normal and without further abd pain.\par \par In the ED, afebrile with SBP 80s\par Meds received LR bolus 500 cc, NS bolus 500 cc, calcium gluconate 1 g, d5w amp, 8 u insulin, zofran 4 mg iv\par \par - Hyperkalemia 2/2 ESRD, EKG without peaked T waves, resolved after medical therapy and HD, last prior to discharge on 12/8\par - Hypotension, unspecified hypotension type in setting of volume depletion, n/v, resolved s/p IVF boluses, restart Lasix after HD\par - Generalized abdominal pain  likely viral gastroenteritis vs gastritis, now resolved; CT C/A/P neg for acute process etiology

## 2018-12-26 PROCEDURE — 87581 M.PNEUMON DNA AMP PROBE: CPT

## 2018-12-26 PROCEDURE — 80053 COMPREHEN METABOLIC PANEL: CPT

## 2018-12-26 PROCEDURE — 74174 CTA ABD&PLVS W/CONTRAST: CPT

## 2018-12-26 PROCEDURE — 82947 ASSAY GLUCOSE BLOOD QUANT: CPT

## 2018-12-26 PROCEDURE — 87040 BLOOD CULTURE FOR BACTERIA: CPT

## 2018-12-26 PROCEDURE — 85730 THROMBOPLASTIN TIME PARTIAL: CPT

## 2018-12-26 PROCEDURE — 82962 GLUCOSE BLOOD TEST: CPT

## 2018-12-26 PROCEDURE — 82803 BLOOD GASES ANY COMBINATION: CPT

## 2018-12-26 PROCEDURE — 83690 ASSAY OF LIPASE: CPT

## 2018-12-26 PROCEDURE — 84520 ASSAY OF UREA NITROGEN: CPT

## 2018-12-26 PROCEDURE — 84295 ASSAY OF SERUM SODIUM: CPT

## 2018-12-26 PROCEDURE — 85027 COMPLETE CBC AUTOMATED: CPT

## 2018-12-26 PROCEDURE — 71275 CT ANGIOGRAPHY CHEST: CPT

## 2018-12-26 PROCEDURE — 84100 ASSAY OF PHOSPHORUS: CPT

## 2018-12-26 PROCEDURE — 93005 ELECTROCARDIOGRAM TRACING: CPT

## 2018-12-26 PROCEDURE — 82435 ASSAY OF BLOOD CHLORIDE: CPT

## 2018-12-26 PROCEDURE — 96365 THER/PROPH/DIAG IV INF INIT: CPT | Mod: XU

## 2018-12-26 PROCEDURE — 87486 CHLMYD PNEUM DNA AMP PROBE: CPT

## 2018-12-26 PROCEDURE — 83735 ASSAY OF MAGNESIUM: CPT

## 2018-12-26 PROCEDURE — 82550 ASSAY OF CK (CPK): CPT

## 2018-12-26 PROCEDURE — 99285 EMERGENCY DEPT VISIT HI MDM: CPT | Mod: 25

## 2018-12-26 PROCEDURE — 81001 URINALYSIS AUTO W/SCOPE: CPT

## 2018-12-26 PROCEDURE — 87633 RESP VIRUS 12-25 TARGETS: CPT

## 2018-12-26 PROCEDURE — 87798 DETECT AGENT NOS DNA AMP: CPT

## 2018-12-26 PROCEDURE — 85610 PROTHROMBIN TIME: CPT

## 2018-12-26 PROCEDURE — 85014 HEMATOCRIT: CPT

## 2018-12-26 PROCEDURE — 80048 BASIC METABOLIC PNL TOTAL CA: CPT

## 2018-12-26 PROCEDURE — 99261: CPT

## 2018-12-26 PROCEDURE — 84132 ASSAY OF SERUM POTASSIUM: CPT

## 2018-12-26 PROCEDURE — 82330 ASSAY OF CALCIUM: CPT

## 2018-12-26 PROCEDURE — 96375 TX/PRO/DX INJ NEW DRUG ADDON: CPT | Mod: XU

## 2018-12-26 PROCEDURE — 83605 ASSAY OF LACTIC ACID: CPT

## 2019-01-01 NOTE — ED PROCEDURE NOTE - ATTENDING CONTRIBUTION TO CARE
I was physically present for the E/M service provided. I was physically present for the key portions of the service provided. ROSENDO. none/GDM diet controlled

## 2019-02-25 ENCOUNTER — APPOINTMENT (OUTPATIENT)
Dept: VASCULAR SURGERY | Facility: CLINIC | Age: 59
End: 2019-02-25
Payer: MEDICAID

## 2019-02-25 PROCEDURE — 99214 OFFICE O/P EST MOD 30 MIN: CPT

## 2019-02-25 PROCEDURE — 93990 DOPPLER FLOW TESTING: CPT

## 2019-02-25 NOTE — PHYSICAL EXAM
[Pulsatile Thrill] : pulsatile thrill [Aneurysm] : no aneurysm [Bleeding] : no bleeding [Normal] : normoactive bowel sounds, soft and nontender, no hepatosplenomegaly or masses appreciated

## 2019-02-28 ENCOUNTER — OUTPATIENT (OUTPATIENT)
Dept: OUTPATIENT SERVICES | Facility: HOSPITAL | Age: 59
LOS: 1 days | Discharge: HOME | End: 2019-02-28

## 2019-02-28 DIAGNOSIS — Z99.2 DEPENDENCE ON RENAL DIALYSIS: Chronic | ICD-10-CM

## 2019-02-28 DIAGNOSIS — T85.898A OTHER SPECIFIED COMPLICATION OF OTHER INTERNAL PROSTHETIC DEVICES, IMPLANTS AND GRAFTS, INITIAL ENCOUNTER: Chronic | ICD-10-CM

## 2019-02-28 DIAGNOSIS — D75.1 SECONDARY POLYCYTHEMIA: ICD-10-CM

## 2019-02-28 DIAGNOSIS — I77.0 ARTERIOVENOUS FISTULA, ACQUIRED: Chronic | ICD-10-CM

## 2019-02-28 NOTE — PAST MEDICAL HISTORY
[Increasing age ( >40 years old)] : Increasing age ( >40 years old) [FreeTextEntry1] : Malignant Hyperthermia Screening Tool and Risk of Bleeding Assessment\par \par Ms. MIHYEON HU denies family history of unexpected death following Anesthesia or Exercise.\par Denies Family history of Malignant Hyperthermia, Muscle or Neuromuscular disorder and High Temperature following exercise.\par \par Ms. MIHYEON HU denies history of Muscle Spasm, Dark or Chocolate - Colored urine and Unanticipated fever immediately following anesthesia or serious exercise. \par Ms. BECKMAN also denies bleeding tendencies/ Risks of Bleeding.\par

## 2019-03-01 ENCOUNTER — APPOINTMENT (OUTPATIENT)
Dept: ENDOVASCULAR SURGERY | Facility: CLINIC | Age: 59
End: 2019-03-01
Payer: MEDICAID

## 2019-03-04 ENCOUNTER — APPOINTMENT (OUTPATIENT)
Dept: ENDOVASCULAR SURGERY | Facility: CLINIC | Age: 59
End: 2019-03-04
Payer: MEDICAID

## 2019-03-04 PROCEDURE — 36902Z: CUSTOM

## 2019-03-04 PROCEDURE — 36907Z: CUSTOM

## 2019-03-05 ENCOUNTER — EMERGENCY (EMERGENCY)
Facility: HOSPITAL | Age: 59
LOS: 1 days | Discharge: HOME | End: 2019-03-05
Attending: EMERGENCY MEDICINE
Payer: MEDICAID

## 2019-03-05 VITALS
TEMPERATURE: 98 F | HEART RATE: 98 BPM | DIASTOLIC BLOOD PRESSURE: 65 MMHG | RESPIRATION RATE: 17 BRPM | HEIGHT: 62 IN | OXYGEN SATURATION: 99 % | WEIGHT: 115.08 LBS | SYSTOLIC BLOOD PRESSURE: 90 MMHG

## 2019-03-05 VITALS
RESPIRATION RATE: 18 BRPM | DIASTOLIC BLOOD PRESSURE: 76 MMHG | OXYGEN SATURATION: 99 % | SYSTOLIC BLOOD PRESSURE: 104 MMHG | TEMPERATURE: 98 F | HEART RATE: 88 BPM

## 2019-03-05 DIAGNOSIS — Z99.2 DEPENDENCE ON RENAL DIALYSIS: Chronic | ICD-10-CM

## 2019-03-05 DIAGNOSIS — T85.898A OTHER SPECIFIED COMPLICATION OF OTHER INTERNAL PROSTHETIC DEVICES, IMPLANTS AND GRAFTS, INITIAL ENCOUNTER: Chronic | ICD-10-CM

## 2019-03-05 DIAGNOSIS — I77.0 ARTERIOVENOUS FISTULA, ACQUIRED: Chronic | ICD-10-CM

## 2019-03-05 LAB
ALBUMIN SERPL ELPH-MCNC: 4.1 G/DL — SIGNIFICANT CHANGE UP (ref 3.3–5)
ALP SERPL-CCNC: 112 U/L — SIGNIFICANT CHANGE UP (ref 40–120)
ALT FLD-CCNC: 11 U/L — SIGNIFICANT CHANGE UP (ref 10–45)
ANION GAP SERPL CALC-SCNC: 16 MMOL/L — SIGNIFICANT CHANGE UP (ref 5–17)
APTT BLD: 37.1 SEC — HIGH (ref 27.5–36.3)
AST SERPL-CCNC: 12 U/L — SIGNIFICANT CHANGE UP (ref 10–40)
BASE EXCESS BLDV CALC-SCNC: 8.8 MMOL/L — HIGH (ref -2–2)
BASOPHILS # BLD AUTO: 0 K/UL — SIGNIFICANT CHANGE UP (ref 0–0.2)
BASOPHILS NFR BLD AUTO: 0.4 % — SIGNIFICANT CHANGE UP (ref 0–2)
BILIRUB SERPL-MCNC: 3 MG/DL — HIGH (ref 0.2–1.2)
BUN SERPL-MCNC: 14 MG/DL — SIGNIFICANT CHANGE UP (ref 7–23)
CA-I SERPL-SCNC: 1.03 MMOL/L — LOW (ref 1.12–1.3)
CALCIUM SERPL-MCNC: 9.1 MG/DL — SIGNIFICANT CHANGE UP (ref 8.4–10.5)
CHLORIDE BLDV-SCNC: 100 MMOL/L — SIGNIFICANT CHANGE UP (ref 96–108)
CHLORIDE SERPL-SCNC: 95 MMOL/L — LOW (ref 96–108)
CO2 BLDV-SCNC: 35 MMOL/L — HIGH (ref 22–30)
CO2 SERPL-SCNC: 28 MMOL/L — SIGNIFICANT CHANGE UP (ref 22–31)
CREAT SERPL-MCNC: 4.01 MG/DL — HIGH (ref 0.5–1.3)
EOSINOPHIL # BLD AUTO: 0.1 K/UL — SIGNIFICANT CHANGE UP (ref 0–0.5)
EOSINOPHIL NFR BLD AUTO: 1.2 % — SIGNIFICANT CHANGE UP (ref 0–6)
GAS PNL BLDV: 134 MMOL/L — LOW (ref 136–145)
GAS PNL BLDV: SIGNIFICANT CHANGE UP
GAS PNL BLDV: SIGNIFICANT CHANGE UP
GLUCOSE BLDV-MCNC: 107 MG/DL — HIGH (ref 70–99)
GLUCOSE SERPL-MCNC: 109 MG/DL — HIGH (ref 70–99)
HCO3 BLDV-SCNC: 34 MMOL/L — HIGH (ref 21–29)
HCT VFR BLD CALC: 40.2 % — SIGNIFICANT CHANGE UP (ref 34.5–45)
HCT VFR BLDA CALC: 43 % — SIGNIFICANT CHANGE UP (ref 39–50)
HGB BLD CALC-MCNC: 14.1 G/DL — SIGNIFICANT CHANGE UP (ref 11.5–15.5)
HGB BLD-MCNC: 14.2 G/DL — SIGNIFICANT CHANGE UP (ref 11.5–15.5)
HOROWITZ INDEX BLDV+IHG-RTO: SIGNIFICANT CHANGE UP
INR BLD: 1.13 RATIO — SIGNIFICANT CHANGE UP (ref 0.88–1.16)
LACTATE BLDV-MCNC: 1.2 MMOL/L — SIGNIFICANT CHANGE UP (ref 0.7–2)
LYMPHOCYTES # BLD AUTO: 0.6 K/UL — LOW (ref 1–3.3)
LYMPHOCYTES # BLD AUTO: 11.4 % — LOW (ref 13–44)
MAGNESIUM SERPL-MCNC: 2.2 MG/DL — SIGNIFICANT CHANGE UP (ref 1.6–2.6)
MCHC RBC-ENTMCNC: 34.6 PG — HIGH (ref 27–34)
MCHC RBC-ENTMCNC: 35.3 GM/DL — SIGNIFICANT CHANGE UP (ref 32–36)
MCV RBC AUTO: 97.9 FL — SIGNIFICANT CHANGE UP (ref 80–100)
MONOCYTES # BLD AUTO: 0.2 K/UL — SIGNIFICANT CHANGE UP (ref 0–0.9)
MONOCYTES NFR BLD AUTO: 4.7 % — SIGNIFICANT CHANGE UP (ref 2–14)
NEUTROPHILS # BLD AUTO: 4.4 K/UL — SIGNIFICANT CHANGE UP (ref 1.8–7.4)
NEUTROPHILS NFR BLD AUTO: 82.4 % — HIGH (ref 43–77)
PCO2 BLDV: 48 MMHG — SIGNIFICANT CHANGE UP (ref 35–50)
PH BLDV: 7.46 — HIGH (ref 7.35–7.45)
PHOSPHATE SERPL-MCNC: 3 MG/DL — SIGNIFICANT CHANGE UP (ref 2.5–4.5)
PLATELET # BLD AUTO: 115 K/UL — LOW (ref 150–400)
PO2 BLDV: 33 MMHG — SIGNIFICANT CHANGE UP (ref 25–45)
POTASSIUM BLDV-SCNC: 3.2 MMOL/L — LOW (ref 3.5–5.3)
POTASSIUM SERPL-MCNC: 3.3 MMOL/L — LOW (ref 3.5–5.3)
POTASSIUM SERPL-SCNC: 3.3 MMOL/L — LOW (ref 3.5–5.3)
PROT SERPL-MCNC: 7.7 G/DL — SIGNIFICANT CHANGE UP (ref 6–8.3)
PROTHROM AB SERPL-ACNC: 13 SEC — HIGH (ref 10–12.9)
RBC # BLD: 4.11 M/UL — SIGNIFICANT CHANGE UP (ref 3.8–5.2)
RBC # FLD: 13.9 % — SIGNIFICANT CHANGE UP (ref 10.3–14.5)
SAO2 % BLDV: 58 % — LOW (ref 67–88)
SODIUM SERPL-SCNC: 139 MMOL/L — SIGNIFICANT CHANGE UP (ref 135–145)
WBC # BLD: 5.3 K/UL — SIGNIFICANT CHANGE UP (ref 3.8–10.5)
WBC # FLD AUTO: 5.3 K/UL — SIGNIFICANT CHANGE UP (ref 3.8–10.5)

## 2019-03-05 PROCEDURE — 99284 EMERGENCY DEPT VISIT MOD MDM: CPT

## 2019-03-05 PROCEDURE — 84132 ASSAY OF SERUM POTASSIUM: CPT

## 2019-03-05 PROCEDURE — 76937 US GUIDE VASCULAR ACCESS: CPT

## 2019-03-05 PROCEDURE — 85014 HEMATOCRIT: CPT

## 2019-03-05 PROCEDURE — 80053 COMPREHEN METABOLIC PANEL: CPT

## 2019-03-05 PROCEDURE — 84295 ASSAY OF SERUM SODIUM: CPT

## 2019-03-05 PROCEDURE — 85610 PROTHROMBIN TIME: CPT

## 2019-03-05 PROCEDURE — 82330 ASSAY OF CALCIUM: CPT

## 2019-03-05 PROCEDURE — 74177 CT ABD & PELVIS W/CONTRAST: CPT | Mod: 26

## 2019-03-05 PROCEDURE — 85730 THROMBOPLASTIN TIME PARTIAL: CPT

## 2019-03-05 PROCEDURE — 85027 COMPLETE CBC AUTOMATED: CPT

## 2019-03-05 PROCEDURE — 84100 ASSAY OF PHOSPHORUS: CPT

## 2019-03-05 PROCEDURE — 82803 BLOOD GASES ANY COMBINATION: CPT

## 2019-03-05 PROCEDURE — 82435 ASSAY OF BLOOD CHLORIDE: CPT

## 2019-03-05 PROCEDURE — 82947 ASSAY GLUCOSE BLOOD QUANT: CPT

## 2019-03-05 PROCEDURE — 74177 CT ABD & PELVIS W/CONTRAST: CPT

## 2019-03-05 PROCEDURE — 83735 ASSAY OF MAGNESIUM: CPT

## 2019-03-05 PROCEDURE — 83605 ASSAY OF LACTIC ACID: CPT

## 2019-03-05 NOTE — ED PROVIDER NOTE - NSFOLLOWUPINSTRUCTIONS_ED_ALL_ED_FT
1. Follow up with your primary care doctor in the next 1-2 days  2. Continue dialysis as scheduled Thursday  3. Maintain appointment with your hematologist next week  4. Bring copies of results to all appointments  5. Rest and increase fluids. Maintain a bland diet  6. Return to ED for change of symptoms including worsened diarrhea, fevers, new abdominal pain, weakness and any other symptoms of concern

## 2019-03-05 NOTE — ED ADULT NURSE NOTE - NSIMPLEMENTINTERV_GEN_ALL_ED
Implemented All Fall with Harm Risk Interventions:  [place] to call system. Call bell, personal items and telephone within reach. Instruct patient to call for assistance. Room bathroom lighting operational. Non-slip footwear when patient is off stretcher. Physically safe environment: no spills, clutter or unnecessary equipment. Stretcher in lowest position, wheels locked, appropriate side rails in place. Provide visual cue, wrist band, yellow gown, etc. Monitor gait and stability. Monitor for mental status changes and reorient to person, place, and time. Review medications for side effects contributing to fall risk. Reinforce activity limits and safety measures with patient and family. Provide visual clues: red socks.

## 2019-03-05 NOTE — ED PROVIDER NOTE - OBJECTIVE STATEMENT
58 year old female w/ pmhx ESRD on dialysis T/TH/Sat presents to ED c/o diarrhea x 10 days. Per patient she has had soft loose stools for the past 10 days. Today was at dialysis where she completed session and told staff about diarrhea. She states they examined her abdomen and she was found to be tender and was advised to come to ED. She denies fevers, chills, chest pain, sob, n/v, recent travel, sick contacts

## 2019-03-05 NOTE — ED PROVIDER NOTE - PROGRESS NOTE DETAILS
Spoke to Renal fellow AGUS Lyons  who is aware of patient and presentation. States even with IV contrast pt can continue dialysis as scheduled Thursday if does not warrant admission. She would not need to be dialyzed prior to this   Lynne Harrison PA-C CT resulted as increased splenomegaly, otherwise stable. Patient tolerated PO in ED. States has appointment with her hematologist in 1 week. Advised to continue appointment as scheduled and maintain dialysis schedule. Should follow up w/ PCP as well. Patient told to bring results to all follow up. Return precautions given. Discussed w/ Dr. Spann, shwetha d/c now Zana: Patient signed out to f/u CT. CT shows increased splenomegaly, other findings on ct are stable. Patient has f/u with hematologist and is being worked up for splenomegaly. patient denies abdominal pain at this time. will po challenge and c/ home. Zana: Patient tolerated PO.

## 2019-03-05 NOTE — ED PROVIDER NOTE - CLINICAL SUMMARY MEDICAL DECISION MAKING FREE TEXT BOX
Attending MD Aguilera: 59 yo female with PMH for ESRD on HD, T,R, S presents after HD for loose stools for past 10days.  Patient denies fever, chills, nausea, vomiting. Patient denies chest pain, palpitations, SOB, or diaphoresis. No travel or sick contacts.  Attending MD Aguilera: A & O x 3, NAD, HEENT WNL and no facial asymmetry; lungs CTAB, heart with reg rhythm without murmur; abdomen soft diffusely tender; extremities with no edema; skin with no rashes, neuro exam non focal with no motor or sensory deficits. DDX:  appendicitis, diverticulitis, colitis.   Plan: labs, VBG, IVF, pain control, CT abd and pelvis.

## 2019-03-05 NOTE — ED PROVIDER NOTE - ATTENDING CONTRIBUTION TO CARE
Attending MD Aguilera:   I personally have seen and examined this patient.  Physician assistant note reviewed and agree on plan of care and except where noted.  See MDM for details.

## 2019-03-05 NOTE — ED ADULT NURSE NOTE - OBJECTIVE STATEMENT
58 year old female A&OX4 PMHX of Hemodialysis Tuesday, Thursday, Saturday, presents with 10 days of diarrhea. Patient states that initially she has 2-3 episodes per day. Over the last several days episodes have increased to 6-7 per day and patient also notes dark tarry loose stools. Patient's abdomen is soft but tender to palpation on all 4 quadrants. Patient is also guarding over upper right and left. Lung sounds are clear and equal bilaterally. Patient denies nausea, vomiting, dizziness, chest pain, shortness of breath.

## 2019-03-06 NOTE — DISCUSSION/SUMMARY
[ER: _____] : ER: [unfilled] [FreeTextEntry1] : 58 year old female w/ PMH of  GN w/ ESRD on HD (T/Th/Sa), hx of pancreatic cyst, polycythemia vera c/b splenomegaly and splenic vein thrombosis w/ infarct, cirrhosis w/ grade 1 gastric varices of unclear etiology, PUD/GERD, AV fistula thrombosis s/p thrombectomy, presents to ED c/o diarrhea x 10 days. Per patient she has had soft loose stools for the past 10 days. Today was at dialysis where she completed session and told staff about diarrhea. She states they examined her abdomen and she was found to be tender and was advised to come to ED. She denies fevers, chills, chest pain, sob, n/v, recent travel, sick contacts. CBC, CMP and vBG stable from previously before. \par CTAP w/ contrast: IMPRESSION:  Marked splenomegaly, increased in size compared to prior exam. The spleen measures up to 17.4 cm in craniocaudal dimension, previously 15.8 cm. Extensive upper abdominal varices as on prior exam. Previously characterized hematoma in the left upper quadrant measures 1.6 x 1.1 cm, previously 1.3 x 1.7 cm. Small volume pelvic free fluid.\par Dispo back home and for dialysis on Thurs. Called pt she is feeling better and taking immodium. No f/n/v/CP/SOB and tolerating PO. Advised her to call back and make a f/u appt if feeling worse or any worsening diarrhea. \par

## 2019-03-11 ENCOUNTER — RESULT REVIEW (OUTPATIENT)
Age: 59
End: 2019-03-11

## 2019-03-11 ENCOUNTER — APPOINTMENT (OUTPATIENT)
Dept: HEMATOLOGY ONCOLOGY | Facility: CLINIC | Age: 59
End: 2019-03-11

## 2019-03-11 VITALS
WEIGHT: 122.33 LBS | BODY MASS INDEX: 23.9 KG/M2 | RESPIRATION RATE: 16 BRPM | HEART RATE: 67 BPM | OXYGEN SATURATION: 98 % | TEMPERATURE: 98.4 F | SYSTOLIC BLOOD PRESSURE: 125 MMHG | DIASTOLIC BLOOD PRESSURE: 85 MMHG

## 2019-03-11 RX ORDER — AMLODIPINE BESYLATE 5 MG/1
5 TABLET ORAL
Qty: 30 | Refills: 3 | Status: DISCONTINUED | COMMUNITY
Start: 2018-05-24 | End: 2019-03-11

## 2019-03-11 RX ORDER — FAMOTIDINE 20 MG/1
20 TABLET, FILM COATED ORAL
Qty: 180 | Refills: 3 | Status: DISCONTINUED | COMMUNITY
Start: 2018-03-30 | End: 2019-03-11

## 2019-03-11 RX ORDER — FUROSEMIDE 20 MG/1
20 TABLET ORAL
Qty: 270 | Refills: 3 | Status: DISCONTINUED | COMMUNITY
Start: 2017-03-31 | End: 2019-03-11

## 2019-03-18 NOTE — REVIEW OF SYSTEMS
[Fever] : no fever [Chills] : no chills [Chest Pain] : no chest pain [Palpitations] : no palpitations [Shortness Of Breath] : no shortness of breath [Abdominal Pain] : no abdominal pain [Diarrhea] : no diarrhea [Joint Pain] : no joint pain [Easy Bleeding] : no tendency for easy bleeding [Easy Bruising] : no tendency for easy bruising

## 2019-03-18 NOTE — HISTORY OF PRESENT ILLNESS
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 Hollie Guzmán in [place] (# [phone]).  She has had a bone marrow biopsy done at the time of diagnosis, but does not know the results.  Patient was previously on Hydrea, however was non-adherent.  She required phlebotomy 1-2 times per year for rising Hct.   No history of arterial clots (no MI, CVA, limb ischemia) \par \par Abdominal US done May 2017 showed: attenuated main splenic vein indicative of previous/chronic thrombosis with surrounding patent varicosities; Portal venous system is patent with hepatopedal blood flow;  Patent hepatic veins.\par \par She was admitted to [facility] from July 3-5, 2018 due to hyperkalemia and thrombosis of her AVF. Hematology was consulted and recommended phlebotomy, but patient refused.  She was discharged on Hydrea 500mg on HD days. \par \par Aug 2018: Hgb 12 and platelets 206, continued Hydrea 500mg on Tues/Thurs/Sat (HD days, to be taken after HD), as well as aspirin 81mg.  \par Nov 2018: Hgb 14 and platelets 170, continued Hydrea and aspirin  [de-identified] : Feeling well.  Continues to take hydrea three times weekly, but admits to missing doses occasionally. \par No abdominal pain, swelling.

## 2019-03-18 NOTE — ASSESSMENT
[FreeTextEntry1] : 58F w/ PMH of PCV (diagnosed ~2012, JAK2+) with splenomegaly and history of splenic vein thrombosis (2015), ESRD on HD (Tu/Th/Sat) via LUE AVF (2/2 chronic GN, started Dec 2017), HTN, who presents for follow-up.\par \par # Polycythemia Vera: MAK 2+\par Patient doing well on hydrea and aspirin - Hgb 13, Hct 37.5, Plt 184 today\par - continue hydrea 500mg AFTER hemodialysis on her HD days (Tuesday, Thursday, Saturday). Dose reduction for ESRD. Patient verbalized understanding. \par - continue aspirin 81mg daily\par - follow-up in 3 months for repeat labs\par - refills for hydrea sent to [facility]\par \par Case discussed with Dr. Ahuja.\par \par Sara Zurita MD\par Hematology/Oncology Fellow, PGY-4\par pager: [phone]\par

## 2019-03-20 ENCOUNTER — RX RENEWAL (OUTPATIENT)
Age: 59
End: 2019-03-20

## 2019-03-22 ENCOUNTER — APPOINTMENT (OUTPATIENT)
Dept: ENDOVASCULAR SURGERY | Facility: CLINIC | Age: 59
End: 2019-03-22
Payer: MEDICAID

## 2019-03-22 VITALS
SYSTOLIC BLOOD PRESSURE: 120 MMHG | RESPIRATION RATE: 18 BRPM | DIASTOLIC BLOOD PRESSURE: 77 MMHG | BODY MASS INDEX: 10.55 KG/M2 | TEMPERATURE: 98.3 F | HEART RATE: 61 BPM | OXYGEN SATURATION: 97 % | WEIGHT: 53.99 LBS

## 2019-03-22 PROCEDURE — 36215Z: CUSTOM | Mod: 59

## 2019-03-22 PROCEDURE — 36905Z: CUSTOM

## 2019-03-22 RX ORDER — HYDROXYUREA 500 MG/1
500 CAPSULE ORAL
Qty: 40 | Refills: 5 | Status: DISCONTINUED | COMMUNITY
Start: 2018-08-06 | End: 2019-03-22

## 2019-03-22 RX ORDER — CALCIUM CARBONATE 500 MG/1
500 TABLET, CHEWABLE ORAL
Refills: 0 | Status: DISCONTINUED | COMMUNITY
Start: 2018-03-30 | End: 2019-03-22

## 2019-03-22 RX ORDER — ASPIRIN ENTERIC COATED TABLETS 81 MG 81 MG/1
81 TABLET, DELAYED RELEASE ORAL
Qty: 90 | Refills: 1 | Status: DISCONTINUED | COMMUNITY
Start: 2018-08-06 | End: 2019-03-22

## 2019-03-22 RX ORDER — LIDOCAINE AND PRILOCAINE 25; 25 MG/G; MG/G
2.5-2.5 CREAM TOPICAL
Qty: 30 | Refills: 11 | Status: DISCONTINUED | COMMUNITY
Start: 2018-01-02 | End: 2019-03-22

## 2019-03-22 NOTE — REASON FOR VISIT
[Other ___] : a [unfilled] visit for [Inadequate Flow within AVF] : inadequate flow within AVF [Clotted Access] : clotted access [Spouse] : spouse [FreeTextEntry2] : painful, swollen extremity/ on examination found to be clotted

## 2019-03-22 NOTE — PAST MEDICAL HISTORY
[Increasing age ( >40 years old)] : Increasing age ( >40 years old) [FreeTextEntry1] : \par \par Malignant Hyperthermis (MH) Screening Tool and Risk of Bleeding Assessement\par Ms. MIHYEON HU  denies family history of unexpected death following Anesthesia or Exercise.\par Denies Family history of Malignant Hyperthermia, Muscle or Neuromuscular disorder and High Temperature following exercise.\par \par Ms. MIHYEON HU denies history of Muscle Spasm, Dark or Chocolate - Colored urine and Unanticipated fever immediately following anesthesia or serious exercise. \par Ms. BECKMAN  also denies bleeding tendencies/ Risks of Bleeding\par

## 2019-03-22 NOTE — HISTORY OF PRESENT ILLNESS
[] : left brachiocephalic fistula [FreeTextEntry1] : creation 9-18-17 Dr Dempsey [FreeTextEntry4] : tuesday 3-19-19 [FreeTextEntry5] : last night [FreeTextEntry6] : Dr Koroma

## 2019-05-03 NOTE — ED PROVIDER NOTE - EKG #1 DATE/TIME
HERPETIC KERATITIS OS - RESIDUAL STROMAL SCARRING. NO EDEMA OR EPI DEFECT.  FOLLOW 04-May-2018 07:20

## 2019-05-06 ENCOUNTER — APPOINTMENT (OUTPATIENT)
Dept: VASCULAR SURGERY | Facility: CLINIC | Age: 59
End: 2019-05-06
Payer: MEDICAID

## 2019-05-06 VITALS
HEIGHT: 60 IN | HEART RATE: 65 BPM | WEIGHT: 116.84 LBS | TEMPERATURE: 98 F | DIASTOLIC BLOOD PRESSURE: 85 MMHG | SYSTOLIC BLOOD PRESSURE: 118 MMHG | BODY MASS INDEX: 22.94 KG/M2

## 2019-05-06 PROCEDURE — 99214 OFFICE O/P EST MOD 30 MIN: CPT

## 2019-05-06 PROCEDURE — 93990 DOPPLER FLOW TESTING: CPT

## 2019-05-08 NOTE — PHYSICAL EXAM
[Pulsatile Thrill] : pulsatile thrill [Aneurysm] : aneurysm [Hand well perfused] : hand well perfused [Warm Extremities] : warm extremities [2+] : left 2+ [Bleeding] : no bleeding [Ulcer] : no ulcer [Normal] : normoactive bowel sounds, soft and nontender, no hepatosplenomegaly or masses appreciated [Gangrene] : no gangrene [de-identified] : intact [de-identified] :  strength 5/5 b/l upper extremities

## 2019-05-08 NOTE — HISTORY OF PRESENT ILLNESS
[] : left basilic fistula [FreeTextEntry1] : 59 yo female with history of esrd on hd via left upper extremity basilic avf presents for follow up with one episode last week with pulling clots and dark blood bleeding after hd.  \par pt otherwise without any other complaints

## 2019-05-08 NOTE — DISCUSSION/SUMMARY
[FreeTextEntry1] : 57 yo female with history of esrd on hd via left upper extremity basilic avf presents for follow up with one episode last week with pulling clots and dark blood bleeding after hd.  \par duplex shows 50-75% stenosis and >75% stenosis within the stent at the proximal upper extremity \par will arrange for left upper extremity fistulagram

## 2019-05-14 ENCOUNTER — FORM ENCOUNTER (OUTPATIENT)
Age: 59
End: 2019-05-14

## 2019-05-15 ENCOUNTER — APPOINTMENT (OUTPATIENT)
Dept: ENDOVASCULAR SURGERY | Facility: CLINIC | Age: 59
End: 2019-05-15
Payer: MEDICAID

## 2019-05-15 VITALS
DIASTOLIC BLOOD PRESSURE: 62 MMHG | TEMPERATURE: 98.6 F | OXYGEN SATURATION: 95 % | WEIGHT: 120.15 LBS | RESPIRATION RATE: 18 BRPM | SYSTOLIC BLOOD PRESSURE: 91 MMHG | HEART RATE: 69 BPM | HEIGHT: 62 IN | BODY MASS INDEX: 22.11 KG/M2

## 2019-05-15 PROCEDURE — 36907Z: CUSTOM | Mod: 59

## 2019-05-15 PROCEDURE — 36902Z: CUSTOM

## 2019-05-15 RX ORDER — ASCORBIC ACID, THIAMINE, RIBOFLAVIN, NIACINAMIDE, PYRIDOXINE, FOLIC ACID, COBALAMIN, BIOTIN, PANTOTHENIC ACID 100; 1.5; 1.7; 20; 10; 1; 6; 300; 1 MG/1; MG/1; MG/1; MG/1; MG/1; MG/1; UG/1; UG/1; MG/1
TABLET, COATED ORAL
Qty: 90 | Refills: 3 | Status: DISCONTINUED | COMMUNITY
Start: 2019-04-02 | End: 2019-05-15

## 2019-05-15 NOTE — PROCEDURE
[Post Fistulogram/Intervention] : Post Fistulogram/Intervention: Site check for bleeding/hematoma, thrill/bruit. Vitals signs: On admission, then q15 mins x 2 [Resume diet] : resume diet [Site check for bleeding/hematoma/thrill/bruit] : Site check for bleeding/hematoma/thrill/bruit [FreeTextEntry1] : left arm/fistula/fistulogram/angioplasty [FreeTextEntry2] : stenosis on duplex

## 2019-05-15 NOTE — PAST MEDICAL HISTORY
[Increasing age ( >40 years old)] : Increasing age ( >40 years old) [No therapy indicated for cases scheduled for less than one hour] : No therapy indicated for cases scheduled for less than one hour. [FreeTextEntry1] : Malignant Hyperthermia Screening Tool and Risk of Bleeding Assessment \par \par \par \par Ms. MIHYEON HU denies family of unexpected death following Anesthesia or Exercise.\par Denies Family history of Malignant Hyperthermia, Muscle or Neuromuscular disorder and High Temperature  following exercise.\par \par Ms. MIHYEON HU denies history of Muscle Spasm, Dark or Chocolate- Colored and Unanticipated fever immediately following anaesthesia or serious exercise.\par Ms. MIHYEON HU also denies bleeding tendencies/Risks of Bleeding.\par

## 2019-05-15 NOTE — HISTORY OF PRESENT ILLNESS
[] : left basilic fistula [FreeTextEntry4] : yesterday [FreeTextEntry1] : Dr Dempsey  9/18/2019 [FreeTextEntry5] : yesterday [FreeTextEntry6] : Dr Koroma

## 2019-05-20 ENCOUNTER — APPOINTMENT (OUTPATIENT)
Dept: VASCULAR SURGERY | Facility: CLINIC | Age: 59
End: 2019-05-20

## 2019-05-30 ENCOUNTER — LABORATORY RESULT (OUTPATIENT)
Age: 59
End: 2019-05-30

## 2019-05-31 ENCOUNTER — APPOINTMENT (OUTPATIENT)
Dept: INTERNAL MEDICINE | Facility: CLINIC | Age: 59
End: 2019-05-31
Payer: MEDICAID

## 2019-05-31 ENCOUNTER — OUTPATIENT (OUTPATIENT)
Dept: OUTPATIENT SERVICES | Facility: HOSPITAL | Age: 59
LOS: 1 days | End: 2019-05-31
Payer: SELF-PAY

## 2019-05-31 VITALS
DIASTOLIC BLOOD PRESSURE: 80 MMHG | SYSTOLIC BLOOD PRESSURE: 102 MMHG | HEART RATE: 84 BPM | BODY MASS INDEX: 21.58 KG/M2 | OXYGEN SATURATION: 97 % | WEIGHT: 118 LBS

## 2019-05-31 DIAGNOSIS — T85.898A OTHER SPECIFIED COMPLICATION OF OTHER INTERNAL PROSTHETIC DEVICES, IMPLANTS AND GRAFTS, INITIAL ENCOUNTER: Chronic | ICD-10-CM

## 2019-05-31 DIAGNOSIS — I10 ESSENTIAL (PRIMARY) HYPERTENSION: ICD-10-CM

## 2019-05-31 DIAGNOSIS — Z99.2 DEPENDENCE ON RENAL DIALYSIS: Chronic | ICD-10-CM

## 2019-05-31 DIAGNOSIS — I77.0 ARTERIOVENOUS FISTULA, ACQUIRED: Chronic | ICD-10-CM

## 2019-05-31 PROCEDURE — 85610 PROTHROMBIN TIME: CPT

## 2019-05-31 PROCEDURE — 85027 COMPLETE CBC AUTOMATED: CPT

## 2019-05-31 PROCEDURE — G0463: CPT

## 2019-05-31 PROCEDURE — 80053 COMPREHEN METABOLIC PANEL: CPT

## 2019-05-31 PROCEDURE — 36415 COLL VENOUS BLD VENIPUNCTURE: CPT

## 2019-05-31 PROCEDURE — 99214 OFFICE O/P EST MOD 30 MIN: CPT | Mod: GC

## 2019-05-31 PROCEDURE — 80061 LIPID PANEL: CPT

## 2019-05-31 PROCEDURE — 84443 ASSAY THYROID STIM HORMONE: CPT

## 2019-05-31 PROCEDURE — 83036 HEMOGLOBIN GLYCOSYLATED A1C: CPT

## 2019-05-31 NOTE — PHYSICAL EXAM
[No Acute Distress] : no acute distress [Well Nourished] : well nourished [Well Developed] : well developed [Well-Appearing] : well-appearing [Normal Sclera/Conjunctiva] : normal sclera/conjunctiva [PERRL] : pupils equal round and reactive to light [Normal Outer Ear/Nose] : the outer ears and nose were normal in appearance [Normal Oropharynx] : the oropharynx was normal [No JVD] : no jugular venous distention [Supple] : supple [No Respiratory Distress] : no respiratory distress  [Clear to Auscultation] : lungs were clear to auscultation bilaterally [No Accessory Muscle Use] : no accessory muscle use [Normal Rate] : normal rate  [Regular Rhythm] : with a regular rhythm [Normal S1, S2] : normal S1 and S2 [No Murmur] : no murmur heard [No Edema] : there was no peripheral edema [Soft] : abdomen soft [Non Tender] : non-tender [Non-distended] : non-distended [No HSM] : no HSM [Normal Bowel Sounds] : normal bowel sounds [No CVA Tenderness] : no CVA  tenderness [No Joint Swelling] : no joint swelling [Normal Gait] : normal gait [Coordination Grossly Intact] : coordination grossly intact [Normal Insight/Judgement] : insight and judgment were intact [de-identified] : +LUE fistula with palpable thrill

## 2019-05-31 NOTE — REVIEW OF SYSTEMS
[Fever] : no fever [Chills] : no chills [Earache] : no earache [Chest Pain] : no chest pain [Palpitations] : no palpitations [Shortness Of Breath] : shortness of breath [Abdominal Pain] : no abdominal pain [Vomiting] : no vomiting [Joint Pain] : no joint pain [Joint Stiffness] : no joint stiffness [Muscle Pain] : no muscle pain [Back Pain] : no back pain [Itching] : no itching [Skin Rash] : no skin rash [Headache] : no headache

## 2019-05-31 NOTE — ASSESSMENT
[FreeTextEntry1] : 57F with ESRD 2/2 chronic GN (HD via LUE fistula TThSat), HTN, PCV c/b splenomegaly/splenic infarcts, grade 1 gastric varices, presenting for check-up, sent by her renal doctor to be plugged back in to primary care. Patient has multiple complaints today.\par \par # Fatigue: likely 2/2 dialysis but will rule out organic causes\par - encouraged good sleep hygeine practices\par - spouse denies loud snoring / apnea episodes, will hold off on sleep study for now\par - will check TSH, CBC to assess organic etiology\par \par # Trouble catching breath: likely related to fatigue from dialysis, pt appears euvolemic on exam today, low suspicion for fluid overload\par - will check CXR to assess for occult lung etiology\par - will check TTE to r/o any cardiac contribution to symptoms\par \par # Hypotension during HD: likely due to large fluid shifts during HD\par - advised patient that on HD days she can consider liberalizing sodium intake prior to HD to help keep BP within target range\par - if issue persists, patient may need re-referral back to renal and/or cardiology or further consideration for possible midodrine on HD days\par \par # HCM:\par - gyn referral given today\par - mammogram referral given today\par - will check routine labs including lipids and hba1c

## 2019-06-01 LAB
ALBUMIN SERPL ELPH-MCNC: 4.7 G/DL — SIGNIFICANT CHANGE UP (ref 3.3–5)
ALP SERPL-CCNC: 123 U/L — HIGH (ref 40–120)
ALT FLD-CCNC: 13 U/L — SIGNIFICANT CHANGE UP (ref 10–45)
ANION GAP SERPL CALC-SCNC: 17 MMOL/L — SIGNIFICANT CHANGE UP (ref 5–17)
AST SERPL-CCNC: 13 U/L — SIGNIFICANT CHANGE UP (ref 10–40)
BILIRUB SERPL-MCNC: 1.9 MG/DL — HIGH (ref 0.2–1.2)
BUN SERPL-MCNC: 31 MG/DL — HIGH (ref 7–23)
CALCIUM SERPL-MCNC: 10.1 MG/DL — SIGNIFICANT CHANGE UP (ref 8.4–10.5)
CHLORIDE SERPL-SCNC: 93 MMOL/L — LOW (ref 96–108)
CHOLEST SERPL-MCNC: 125 MG/DL — SIGNIFICANT CHANGE UP (ref 10–199)
CO2 SERPL-SCNC: 29 MMOL/L — SIGNIFICANT CHANGE UP (ref 22–31)
CREAT SERPL-MCNC: 6.36 MG/DL — HIGH (ref 0.5–1.3)
ESTIMATED AVERAGE GLUCOSE: 94 MG/DL — SIGNIFICANT CHANGE UP (ref 68–114)
GLUCOSE SERPL-MCNC: 99 MG/DL — SIGNIFICANT CHANGE UP (ref 70–99)
HBA1C BLD-MCNC: 4.9 % — SIGNIFICANT CHANGE UP (ref 4–5.6)
HCT VFR BLD CALC: 45.2 % — HIGH (ref 34.5–45)
HDLC SERPL-MCNC: 43 MG/DL — LOW
HGB BLD-MCNC: 13.7 G/DL — SIGNIFICANT CHANGE UP (ref 11.5–15.5)
INR BLD: 1.1 RATIO — SIGNIFICANT CHANGE UP (ref 0.88–1.16)
LIPID PNL WITH DIRECT LDL SERPL: 63 MG/DL — SIGNIFICANT CHANGE UP
MCHC RBC-ENTMCNC: 30.3 GM/DL — LOW (ref 32–36)
MCHC RBC-ENTMCNC: 33.3 PG — SIGNIFICANT CHANGE UP (ref 27–34)
MCV RBC AUTO: 110 FL — HIGH (ref 80–100)
PLATELET # BLD AUTO: 245 K/UL — SIGNIFICANT CHANGE UP (ref 150–400)
POTASSIUM SERPL-MCNC: 5.1 MMOL/L — SIGNIFICANT CHANGE UP (ref 3.5–5.3)
POTASSIUM SERPL-SCNC: 5.1 MMOL/L — SIGNIFICANT CHANGE UP (ref 3.5–5.3)
PROT SERPL-MCNC: 8 G/DL — SIGNIFICANT CHANGE UP (ref 6–8.3)
PROTHROM AB SERPL-ACNC: 12.5 SEC — SIGNIFICANT CHANGE UP (ref 10–13.1)
RBC # BLD: 4.11 M/UL — SIGNIFICANT CHANGE UP (ref 3.8–5.2)
RBC # FLD: 14.6 % — HIGH (ref 10.3–14.5)
SODIUM SERPL-SCNC: 139 MMOL/L — SIGNIFICANT CHANGE UP (ref 135–145)
T4 FREE+ TSH PNL SERPL: 1.69 UIU/ML — SIGNIFICANT CHANGE UP (ref 0.27–4.2)
TOTAL CHOLESTEROL/HDL RATIO MEASUREMENT: 2.9 RATIO — LOW (ref 3.3–7.1)
TRIGL SERPL-MCNC: 96 MG/DL — SIGNIFICANT CHANGE UP (ref 10–149)
WBC # BLD: 9.29 K/UL — SIGNIFICANT CHANGE UP (ref 3.8–10.5)
WBC # FLD AUTO: 9.29 K/UL — SIGNIFICANT CHANGE UP (ref 3.8–10.5)

## 2019-06-03 ENCOUNTER — APPOINTMENT (OUTPATIENT)
Dept: VASCULAR SURGERY | Facility: CLINIC | Age: 59
End: 2019-06-03

## 2019-06-05 DIAGNOSIS — I95.9 HYPOTENSION, UNSPECIFIED: ICD-10-CM

## 2019-06-05 DIAGNOSIS — N18.6 END STAGE RENAL DISEASE: ICD-10-CM

## 2019-06-05 DIAGNOSIS — R53.83 OTHER FATIGUE: ICD-10-CM

## 2019-06-07 ENCOUNTER — APPOINTMENT (OUTPATIENT)
Dept: MAMMOGRAPHY | Facility: IMAGING CENTER | Age: 59
End: 2019-06-07
Payer: COMMERCIAL

## 2019-06-07 ENCOUNTER — OUTPATIENT (OUTPATIENT)
Dept: OUTPATIENT SERVICES | Facility: HOSPITAL | Age: 59
LOS: 1 days | End: 2019-06-07
Payer: SELF-PAY

## 2019-06-07 DIAGNOSIS — N18.6 END STAGE RENAL DISEASE: ICD-10-CM

## 2019-06-07 DIAGNOSIS — I77.0 ARTERIOVENOUS FISTULA, ACQUIRED: Chronic | ICD-10-CM

## 2019-06-07 DIAGNOSIS — T85.898A OTHER SPECIFIED COMPLICATION OF OTHER INTERNAL PROSTHETIC DEVICES, IMPLANTS AND GRAFTS, INITIAL ENCOUNTER: Chronic | ICD-10-CM

## 2019-06-07 DIAGNOSIS — Z99.2 DEPENDENCE ON RENAL DIALYSIS: Chronic | ICD-10-CM

## 2019-06-07 PROCEDURE — 77067 SCR MAMMO BI INCL CAD: CPT | Mod: 26

## 2019-06-07 PROCEDURE — 77063 BREAST TOMOSYNTHESIS BI: CPT

## 2019-06-07 PROCEDURE — 77063 BREAST TOMOSYNTHESIS BI: CPT | Mod: 26

## 2019-06-07 PROCEDURE — 77067 SCR MAMMO BI INCL CAD: CPT

## 2019-06-12 ENCOUNTER — OUTPATIENT (OUTPATIENT)
Dept: OUTPATIENT SERVICES | Facility: HOSPITAL | Age: 59
LOS: 1 days | Discharge: HOME | End: 2019-06-12

## 2019-06-12 DIAGNOSIS — T85.898A OTHER SPECIFIED COMPLICATION OF OTHER INTERNAL PROSTHETIC DEVICES, IMPLANTS AND GRAFTS, INITIAL ENCOUNTER: Chronic | ICD-10-CM

## 2019-06-12 DIAGNOSIS — D75.1 SECONDARY POLYCYTHEMIA: ICD-10-CM

## 2019-06-12 DIAGNOSIS — I77.0 ARTERIOVENOUS FISTULA, ACQUIRED: Chronic | ICD-10-CM

## 2019-06-12 DIAGNOSIS — Z99.2 DEPENDENCE ON RENAL DIALYSIS: Chronic | ICD-10-CM

## 2019-06-13 ENCOUNTER — OTHER (OUTPATIENT)
Age: 59
End: 2019-06-13

## 2019-06-17 ENCOUNTER — APPOINTMENT (OUTPATIENT)
Dept: HEMATOLOGY ONCOLOGY | Facility: CLINIC | Age: 59
End: 2019-06-17

## 2019-06-17 VITALS
DIASTOLIC BLOOD PRESSURE: 81 MMHG | OXYGEN SATURATION: 99 % | BODY MASS INDEX: 22.58 KG/M2 | TEMPERATURE: 97.8 F | WEIGHT: 123.46 LBS | HEART RATE: 70 BPM | RESPIRATION RATE: 16 BRPM | SYSTOLIC BLOOD PRESSURE: 124 MMHG

## 2019-06-17 LAB
BASOPHILS # BLD AUTO: 0 K/UL — SIGNIFICANT CHANGE UP (ref 0–0.2)
BASOPHILS NFR BLD AUTO: 0.5 % — SIGNIFICANT CHANGE UP (ref 0–2)
EOSINOPHIL # BLD AUTO: 0.2 K/UL — SIGNIFICANT CHANGE UP (ref 0–0.5)
EOSINOPHIL NFR BLD AUTO: 2.9 % — SIGNIFICANT CHANGE UP (ref 0–6)
HCT VFR BLD CALC: 38.7 % — SIGNIFICANT CHANGE UP (ref 34.5–45)
HGB BLD-MCNC: 12.5 G/DL — SIGNIFICANT CHANGE UP (ref 11.5–15.5)
LYMPHOCYTES # BLD AUTO: 1.1 K/UL — SIGNIFICANT CHANGE UP (ref 1–3.3)
LYMPHOCYTES # BLD AUTO: 14.9 % — SIGNIFICANT CHANGE UP (ref 13–44)
MCHC RBC-ENTMCNC: 32.3 G/DL — SIGNIFICANT CHANGE UP (ref 32–36)
MCHC RBC-ENTMCNC: 33.3 PG — SIGNIFICANT CHANGE UP (ref 27–34)
MCV RBC AUTO: 103 FL — HIGH (ref 80–100)
MONOCYTES # BLD AUTO: 0.3 K/UL — SIGNIFICANT CHANGE UP (ref 0–0.9)
MONOCYTES NFR BLD AUTO: 4 % — SIGNIFICANT CHANGE UP (ref 2–14)
NEUTROPHILS # BLD AUTO: 5.6 K/UL — SIGNIFICANT CHANGE UP (ref 1.8–7.4)
NEUTROPHILS NFR BLD AUTO: 77.6 % — HIGH (ref 43–77)
PLATELET # BLD AUTO: 195 K/UL — SIGNIFICANT CHANGE UP (ref 150–400)
RBC # BLD: 3.75 M/UL — LOW (ref 3.8–5.2)
RBC # FLD: 14.3 % — SIGNIFICANT CHANGE UP (ref 10.3–14.5)
WBC # BLD: 7.2 K/UL — SIGNIFICANT CHANGE UP (ref 3.8–10.5)
WBC # FLD AUTO: 7.2 K/UL — SIGNIFICANT CHANGE UP (ref 3.8–10.5)

## 2019-06-18 NOTE — HISTORY OF PRESENT ILLNESS
[de-identified] : *Primary hematologist: Dr. 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 Hollie Guzmán in [place] (# [phone]).  She has had a bone marrow biopsy done at the time of diagnosis, but does not know the results.  Patient was previously on Hydrea, however was non-adherent.  She required phlebotomy 1-2 times per year for rising Hct.   No history of arterial clots (no MI, CVA, limb ischemia) \par \par Abdominal US done May 2017 showed: attenuated main splenic vein indicative of previous/chronic thrombosis with surrounding patent varicosities; Portal venous system is patent with hepatopedal blood flow;  Patent hepatic veins.\par \par She was admitted to [place] from July 3-5, 2018 due to hyperkalemia and thrombosis of her AVF. Hematology was consulted and recommended phlebotomy, but patient refused.  She was discharged on Hydrea 500mg on HD days. \par \par Aug 2018: Hgb 12 and platelets 206, continued Hydrea 500mg on Tues/Thurs/Sat (HD days, to be taken after HD), as well as aspirin 81mg.  \par Nov 2018: Hgb 14 and platelets 170, continued Hydrea and aspirin  [de-identified] : Feeling well.  Continues to take hydrea three times weekly. States she has had some low BPs on dialysis.  She is unaware that she has been prescribed midodrine, will f/u with her nephrologist.  \par Denies headache, blurry vision, abd pain.  States sometimes feel fatigued after HD.

## 2019-06-18 NOTE — ASSESSMENT
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  Reports has not been on ASA for 1.5 years, stated stopped it 2/2 bleeding at AV fistula.  If no contraindications from nephro we would recommend continuing asa 81 daily.\par - continue hydrea 500mg AFTER hemodialysis on her HD days (Tuesday, Thursday, Saturday). Dose reduction for ESRD. Patient verbalized understanding. \par - follow-up in 3 months for repeat labs\par \par Case discussed with Dr. Scales\par \par \par

## 2019-06-18 NOTE — REVIEW OF SYSTEMS
[Negative] : Neurological [Fever] : no fever [Chest Pain] : no chest pain [Chills] : no chills [Palpitations] : no palpitations [Shortness Of Breath] : no shortness of breath [Abdominal Pain] : no abdominal pain [Diarrhea] : no diarrhea [Joint Pain] : no joint pain [Easy Bleeding] : no tendency for easy bleeding [Easy Bruising] : no tendency for easy bruising

## 2019-06-28 ENCOUNTER — OUTPATIENT (OUTPATIENT)
Dept: OUTPATIENT SERVICES | Facility: HOSPITAL | Age: 59
LOS: 1 days | End: 2019-06-28
Payer: SELF-PAY

## 2019-06-28 ENCOUNTER — APPOINTMENT (OUTPATIENT)
Dept: CV DIAGNOSITCS | Facility: HOSPITAL | Age: 59
End: 2019-06-28

## 2019-06-28 DIAGNOSIS — I95.9 HYPOTENSION, UNSPECIFIED: ICD-10-CM

## 2019-06-28 DIAGNOSIS — I77.0 ARTERIOVENOUS FISTULA, ACQUIRED: Chronic | ICD-10-CM

## 2019-06-28 DIAGNOSIS — Z99.2 DEPENDENCE ON RENAL DIALYSIS: Chronic | ICD-10-CM

## 2019-06-28 DIAGNOSIS — R53.83 OTHER FATIGUE: ICD-10-CM

## 2019-06-28 DIAGNOSIS — N18.6 END STAGE RENAL DISEASE: ICD-10-CM

## 2019-06-28 DIAGNOSIS — T85.898A OTHER SPECIFIED COMPLICATION OF OTHER INTERNAL PROSTHETIC DEVICES, IMPLANTS AND GRAFTS, INITIAL ENCOUNTER: Chronic | ICD-10-CM

## 2019-06-28 PROCEDURE — 93306 TTE W/DOPPLER COMPLETE: CPT

## 2019-06-28 PROCEDURE — 93306 TTE W/DOPPLER COMPLETE: CPT | Mod: 26

## 2019-07-09 NOTE — ED ADULT NURSE NOTE - CHIEF COMPLAINT
Pulmonary Follow Up    CC: F/U COPD  HPI: Cisco Brown is a 68 year old male with a history of COPD, ischemic cardiomyopathy. Last seen in clinic in April following an acute exacerbation. Since that time he has been doing well. He walks daily in the am at [email or web address] 2 times around the store. Has not required his rescue inhaler. Using his symbicort bid and rinsing his mouth out.  No recent illness.   Since my last visit, patient denies cough, purulent sputum, or hemoptysis.  Patient denies any rhinorrhea or post nasal gtt. Patient denies GERD. Patient denies any shortness of breath or dyspnea on exertion.  Patient denies any chest pain, pleursiy, orthopnea, PND, or LE swelling. Patient denies fevers, chills, night sweats, or weight loss.     ROS: The remainder of a 12 point review of systems was performed in clinic and is negative.     Patient's PMH, PSH, FH, SH, and Allergies were reviewed and are unchanged.     Current Outpatient Medications   Medication Sig   • budesonide-formoterol (SYMBICORT) 160-4.5 MCG/ACT inhaler Inhale 2 puffs into the lungs 2 times daily.   • albuterol-ipratropium (COMBIVENT RESPIMAT) 100-20 MCG/ACT inhaler Inhale 1 puff into the lungs every 4 hours as needed for Shortness of Breath.   • spironolactone (ALDACTONE) 25 MG tablet Take 1 tablet by mouth daily.   • furosemide (LASIX) 80 MG tablet Take 1 tablet by mouth daily.   • ergocalciferol (DRISDOL) 35331 units capsule Take 1 capsule by mouth once a week.   • amiodarone (PACERONE,CORDARONE) 200 MG tablet Take 1 tablet by mouth daily. Appointment required for further refills.   • atorvastatin (LIPITOR) 40 MG tablet Take 1 tablet by mouth daily.   • carvedilol (COREG) 6.25 MG tablet Take 1 tablet by mouth 2 times daily (with meals).   • losartan (COZAAR) 25 MG tablet Take 1 tablet by mouth every other day.   • aspirin 81 MG EC tablet Take 1 tablet by mouth daily.   • guaiFENesin-codeine (CHERATUSSIN AC) 100-10 MG/5ML liquid Take 5 mLs by  mouth 3 times daily as needed for Cough.     No current facility-administered medications for this visit.      Facility-Administered Medications Ordered in Other Visits   Medication   • sodium chloride (NORMAL SALINE) 0.9 % bolus 1,000 mL       /68   Pulse 64   Resp 20   Ht 6' 1\" (1.854 m)   Wt 109.8 kg   BMI 31.93 kg/m²   BSA 2.33 m²   Constitutional:  Well developed, well nourished, no acute distress, non-toxic appearance. Well groomed.   Eyes:  PERRL, conjunctiva normal no scleral icterus, EOMI  HENT:  Atraumatic, external ears normal, eardrum bilaterally well visualized. Uvula midline, oropharynx moist no oral lesions or thrush, no pharyngeal exudates.  Respiratory:  No respiratory distress, normal breath sounds, no rales, no wheezing. No accessory muscle use.  No conversational dyspnea.  No stridor. Trachea midline.  Cardiovascular:  Normal rate, normal rhythm, no murmurs, no gallops, no rubs. Normal 2+ radial pulses bilaterally. No jugular venous distention appreciated  GI:  Soft, nondistended, normal bowel sounds, nontender, no masses appreciated, no rebound, no guarding   Musculoskeletal:  No kyphosis. No clubbing, No cyanosis, No lower extremity edema, no tenderness, no deformities. Symmetric lower extremities bilaterally.    Integument:  No rashes, skin intact  Neurologic:  Alert & oriented x 3, CN 2-12 normal, normal gross upper and lower motor function, normal sensory function.    Psychiatric:  Speech and behavior appropriate.  Good insight. Orientated to person, place and time        IMAGING (personally reviewed):   None since last visit.    PFT (personally reviewed):  7/9/19: improved since 2016.  Reduced ratio      ASSESSMENT AND PLAN:    This is a 68-year-old male smoker with a history of ischemic cardiomyopathy and chronic obstructive pulmonary disease/chronic bronchitis. Back to baseline      1.  Chronic bronchitis:  Stable, now at baseline. PFTs are reassuring and improved since 2016.  No exacerbations since April.  Cold seems to be his trigger. Continue with his current regimen to Symbicort b.i.d. as well as p.r.n. Combivent.  He also appears to be still recovering from his exacerbation.   I discussed follow up with me in prior to winter to discuss action plan     2.  Nicotine dependence.  The patient is smoking 1-2 cigarettes a day.  He is trying to quit smoking but cant get rid of the couple per day.  .  He has significantly decreased his usage.      3.  He is up-to-date on his pneumonia vaccine; however, he is deferring the flu shot.      I have spent greater then 50% of this visit counseling and/or coordinating care for the patient. Total time spent = 25 mins    Carmine Skelton MD  Pulmonary & Critical Care Medicine  Pager: [phone]   The patient is a 57y Female complaining of abdominal pain.

## 2019-08-05 ENCOUNTER — APPOINTMENT (OUTPATIENT)
Dept: ENDOVASCULAR SURGERY | Facility: CLINIC | Age: 59
End: 2019-08-05
Payer: MEDICAID

## 2019-08-05 VITALS
TEMPERATURE: 98.3 F | WEIGHT: 123 LBS | RESPIRATION RATE: 16 BRPM | OXYGEN SATURATION: 98 % | BODY MASS INDEX: 22.63 KG/M2 | HEART RATE: 56 BPM | SYSTOLIC BLOOD PRESSURE: 123 MMHG | DIASTOLIC BLOOD PRESSURE: 80 MMHG | HEIGHT: 62 IN

## 2019-08-05 PROCEDURE — 36902Z: CUSTOM

## 2019-08-05 RX ORDER — MIDODRINE HYDROCHLORIDE 10 MG/1
10 TABLET ORAL
Qty: 15 | Refills: 5 | Status: DISCONTINUED | COMMUNITY
Start: 2019-06-13 | End: 2019-08-05

## 2019-08-05 NOTE — REASON FOR VISIT
[Other ___] : a [unfilled] visit for [Family Member] : family member [Prolonged Bleeding] : prolonged bleeding [FreeTextEntry2] : stenosis on duplex

## 2019-08-05 NOTE — HISTORY OF PRESENT ILLNESS
[] : left basilic fistula [FreeTextEntry1] : Dr Dempsey  9/18/2017 [FreeTextEntry4] : yesterday [FreeTextEntry5] : yesterday [FreeTextEntry6] : Dr Koroma

## 2019-08-05 NOTE — ASSESSMENT
[FreeTextEntry1] : PT with ESRD on HD experiencing "pulling clots" at HD for evaluation and possible intervention.  Consent obtained.\par \par Fistulogram demonstrates severe stenoses within outflow stent and moderate stenosis in juxtaanastamotic region.  PTA to 8mm on venous outflow and 6 mm on juxtaanastamotic region with good result and no complication.  EBL= 5 cc.  Full report to follow.

## 2019-08-05 NOTE — PROCEDURE
[Resume diet] : resume diet [Site check for bleeding/hematoma/thrill/bruit] : Site check for bleeding/hematoma/thrill/bruit [Vital signs on admission the q 15 mins x2] : Vital signs on admission the q 15 mins x2 [FreeTextEntry1] : left arm/fistula/fistulogram/angioplasty

## 2019-08-05 NOTE — PAST MEDICAL HISTORY
[No therapy indicated for cases scheduled for less than one hour] : No therapy indicated for cases scheduled for less than one hour. [Increasing age ( >40 years old)] : Increasing age ( >40 years old) [FreeTextEntry1] : Malignant Hyperthermia Screening Tool and Risk of Bleeding Assessment \par \par Ms. MIHYEON HU denies family of unexpected death following Anesthesia or Exercise.\par Denies Family history of Malignant Hyperthermia, Muscle or Neuromuscular disorder and High Temperature  following exercise.\par \par Ms. MIHYEON HU denies history of Muscle Spasm, Dark or Chocolate- Colored and Unanticipated fever immediately following anaesthesia or serious exercise.\par Ms. MIHYEON HU also denies bleeding tendencies/Risks of Bleeding.\par

## 2019-09-16 ENCOUNTER — APPOINTMENT (OUTPATIENT)
Dept: HEMATOLOGY ONCOLOGY | Facility: CLINIC | Age: 59
End: 2019-09-16

## 2019-09-26 ENCOUNTER — OUTPATIENT (OUTPATIENT)
Dept: OUTPATIENT SERVICES | Facility: HOSPITAL | Age: 59
LOS: 1 days | Discharge: HOME | End: 2019-09-26

## 2019-09-26 DIAGNOSIS — D75.1 SECONDARY POLYCYTHEMIA: ICD-10-CM

## 2019-09-26 DIAGNOSIS — T85.898A OTHER SPECIFIED COMPLICATION OF OTHER INTERNAL PROSTHETIC DEVICES, IMPLANTS AND GRAFTS, INITIAL ENCOUNTER: Chronic | ICD-10-CM

## 2019-09-26 DIAGNOSIS — Z99.2 DEPENDENCE ON RENAL DIALYSIS: Chronic | ICD-10-CM

## 2019-09-26 DIAGNOSIS — I77.0 ARTERIOVENOUS FISTULA, ACQUIRED: Chronic | ICD-10-CM

## 2019-09-27 ENCOUNTER — APPOINTMENT (OUTPATIENT)
Dept: HEMATOLOGY ONCOLOGY | Facility: CLINIC | Age: 59
End: 2019-09-27
Payer: SUBSIDIZED

## 2019-09-27 VITALS
DIASTOLIC BLOOD PRESSURE: 70 MMHG | RESPIRATION RATE: 16 BRPM | TEMPERATURE: 97.7 F | HEART RATE: 62 BPM | WEIGHT: 124.56 LBS | SYSTOLIC BLOOD PRESSURE: 107 MMHG | BODY MASS INDEX: 23.82 KG/M2 | HEIGHT: 60.63 IN | OXYGEN SATURATION: 99 %

## 2019-09-27 PROCEDURE — 99214 OFFICE O/P EST MOD 30 MIN: CPT

## 2019-09-30 NOTE — REASON FOR VISIT
[Follow-Up Visit] : a follow-up visit for [FreeTextEntry2] : Juan José 2 + P vera.   [Blood Count Assessment] : blood count assessment

## 2019-09-30 NOTE — PHYSICAL EXAM
[Normal] : normal appearance, no rash, nodules, vesicles, ulcers, erythema [de-identified] : left arm dialysis fistula  [de-identified] : No HSM

## 2019-09-30 NOTE — ASSESSMENT
[FreeTextEntry1] : This is a 58 woman with a history of Polycythemia Vera, MKA 2 + since 2012. 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  She is now under my super vision for the P. Vera.  On hydrea 500mg 3 times a week with dialysis.  Counts appear to be very stable lately. Though report not available, patient is a good historian. Stated that her Hg was 12.2g/dl on the most recent HD session.  COntinue current dose of Hydrea 500mg 3 times a week. Follow up Q 3 Months. As she has frequent labs with dialysis, do not have to necessarily draw them here at  hematology, her dialysis access s likey much easier for her blodo draws.  \par

## 2019-09-30 NOTE — HISTORY OF PRESENT ILLNESS
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 Hollie Guzmán in [place] (# [phone]). She has had a bone marrow biopsy done at the time of diagnosis, but does not know the results. Patient was previously on Hydrea, however was non-adherent. She required phlebotomy 1-2 times per year for rising Hct. No history of arterial clots (no MI, CVA, limb ischemia) \par \par Abdominal US done May 2017 showed: attenuated main splenic vein indicative of previous/chronic thrombosis with surrounding patent varicosities; Portal venous system is patent with hepatopedal blood flow; Patent hepatic veins.\par \par She was admitted to [place] from July 3-5, 2018 due to hyperkalemia and thrombosis of her AVF. Hematology was consulted and recommended phlebotomy, but patient refused. She was discharged on Hydrea 500mg on HD days.  which she is still on. As of 9/27/19 she has transferred from the hematology fellows clinic to my service at [facility].  \par \par Does not feel much side effects from the hydrea, but gets nausea with the dialysis.  \par \par Blood pressure went up with dialysis.  But sometimes the fistula clots, gets it opened again every 3 months.  \par Hg was 12.2g/dl at Dialysis last week.

## 2019-10-07 ENCOUNTER — APPOINTMENT (OUTPATIENT)
Dept: VASCULAR SURGERY | Facility: CLINIC | Age: 59
End: 2019-10-07
Payer: MEDICAID

## 2019-10-07 PROCEDURE — 99212 OFFICE O/P EST SF 10 MIN: CPT

## 2019-10-07 PROCEDURE — 93990 DOPPLER FLOW TESTING: CPT

## 2019-10-07 NOTE — ASSESSMENT
[FreeTextEntry1] : 60 yo female with history of esrd on hd via left upper extremity basilic avf presents for follow up \par duplex shows 75% stenosis of the juxta-anastomosis and 75% in stent stenosis at the axilla with a flow rate of 957\par given the prolonged bleeding and pulsatile aneurysmal areas in the setting of severe stenosis of the axillary stent will arrange for left upper extremity angiogram

## 2019-10-07 NOTE — PHYSICAL EXAM
[Pulsatile Thrill] : pulsatile thrill [Aneurysm] : aneurysm [Bleeding] : no bleeding [Hand well perfused] : hand well perfused [Ulcer] : no ulcer [Gangrene] : no gangrene [2+] : left 2+ [Normal] : coordination grossly intact, no focal deficits [de-identified] :  strength 5/5 b/l upper extremities [de-identified] : intact

## 2019-10-07 NOTE — HISTORY OF PRESENT ILLNESS
[] : left basilic fistula [FreeTextEntry1] : 60 yo female with history of esrd on hd via left upper extremity basilic avf presents for follow up \par pt reports 10 min of bleeding occasionally longer after hd completed in addition to occasional difficult cannulation

## 2019-10-14 ENCOUNTER — FORM ENCOUNTER (OUTPATIENT)
Age: 59
End: 2019-10-14

## 2019-10-15 ENCOUNTER — APPOINTMENT (OUTPATIENT)
Dept: ENDOVASCULAR SURGERY | Facility: CLINIC | Age: 59
End: 2019-10-15
Payer: MEDICAID

## 2019-10-15 VITALS
WEIGHT: 124 LBS | BODY MASS INDEX: 16.8 KG/M2 | HEART RATE: 68 BPM | RESPIRATION RATE: 16 BRPM | TEMPERATURE: 97.6 F | SYSTOLIC BLOOD PRESSURE: 120 MMHG | HEIGHT: 72 IN | DIASTOLIC BLOOD PRESSURE: 80 MMHG | OXYGEN SATURATION: 99 %

## 2019-10-15 PROCEDURE — 36902Z: CUSTOM

## 2019-10-15 PROCEDURE — 36215Z: CUSTOM | Mod: 59

## 2019-10-23 NOTE — PAST MEDICAL HISTORY
[Increasing age ( >40 years old)] : Increasing age ( >40 years old) [No therapy indicated for cases scheduled for less than one hour] : No therapy indicated for cases scheduled for less than one hour. [FreeTextEntry1] : Malignant Hyperthermia Screening Tool and Risk of Bleeding Assessment \par \par Ms. MIHYEON HU denies family of unexpected death following Anesthesia or Exercise.\par Denies Family history of Malignant Hyperthermia, Muscle or Neuromuscular disorder and High Temperature  following exercise.\par \par Ms. MIHYEON HU denies history of Muscle Spasm, Dark or Chocolate- Colored and Unanticipated fever immediately following anaesthesia or serious exercise.\par Ms. MIHYEON HU also denies bleeding tendencies/Risks of Bleeding.\par

## 2019-10-23 NOTE — REASON FOR VISIT
[Other ___] : a [unfilled] visit for [Prolonged Bleeding] : prolonged bleeding [Family Member] : family member [High Venous Pressure] : high venous pressure [FreeTextEntry2] : stenosis on duplex

## 2019-10-23 NOTE — HISTORY OF PRESENT ILLNESS
[] : left basilic fistula [FreeTextEntry1] : \par referred from dialysis, only received one hour of treatment last night, but access not clotted\par alert and oriented\par accompanied by \par feels ok\par no medications taken today\par No reported falls\par \par  [FreeTextEntry4] : yesterday [FreeTextEntry5] : yesterday at 8:30 [FreeTextEntry6] : Dr Koroma

## 2019-10-23 NOTE — PROCEDURE
[Site check for bleeding/hematoma/thrill/bruit] : Site check for bleeding/hematoma/thrill/bruit [Resume diet] : resume diet [Vital signs on admission the q 15 mins x2] : Vital signs on admission the q 15 mins x2 [FreeTextEntry1] : left arm fistula fistulogram/angioplasty

## 2019-10-28 DIAGNOSIS — R52 PAIN, UNSPECIFIED: ICD-10-CM

## 2019-12-04 NOTE — PROGRESS NOTE ADULT - PROBLEM SELECTOR PLAN 7
Relative hypotension, as labetalol and HCTZ are currently held  - Continue home amlodipine. W Plasty Text: The lesion was extirpated to the level of the fat with a #15 scalpel blade.  Given the location of the defect, shape of the defect and the proximity to free margins a W-plasty was deemed most appropriate for repair.  Using a sterile surgical marker, the appropriate transposition arms of the W-plasty were drawn incorporating the defect and placing the expected incisions within the relaxed skin tension lines where possible.    The area thus outlined was incised deep to adipose tissue with a #15 scalpel blade.  The skin margins were undermined to an appropriate distance in all directions utilizing iris scissors.  The opposing transposition arms were then transposed into place in opposite direction and anchored with interrupted buried subcutaneous sutures.

## 2019-12-05 ENCOUNTER — APPOINTMENT (OUTPATIENT)
Dept: VASCULAR SURGERY | Facility: CLINIC | Age: 59
End: 2019-12-05
Payer: MEDICAID

## 2019-12-05 PROCEDURE — 99213 OFFICE O/P EST LOW 20 MIN: CPT

## 2019-12-05 PROCEDURE — 93990 DOPPLER FLOW TESTING: CPT

## 2019-12-05 NOTE — HISTORY OF PRESENT ILLNESS
[] : left basilic fistula [FreeTextEntry1] : 60 yo female with history of esrd on hd via left upper extremity basilic avf presents for follow up \par pt reports occational prolonged bleeding

## 2019-12-05 NOTE — PHYSICAL EXAM
[Thrill] : thrill [Pulsatile Thrill] : pulsatile thrill [Aneurysm] : aneurysm [Hand well perfused] : hand well perfused [Warm Extremities] : warm extremities [2+] : right 2+ [Normal] : coordination grossly intact, no focal deficits [Bleeding] : no bleeding [de-identified] :  strength 5/5 b/l upper extremities [Ulcer] : no ulcer [de-identified] : intact

## 2019-12-05 NOTE — DISCUSSION/SUMMARY
[FreeTextEntry1] : 58 yo female with history of esrd on hd via left upper extremity basilic avf presents for follow up \par duplex shows >75% in stent stenosis at the axilla \par \par will arrange for left upper extremity fistulogram

## 2019-12-09 ENCOUNTER — RX RENEWAL (OUTPATIENT)
Age: 59
End: 2019-12-09

## 2019-12-11 ENCOUNTER — FORM ENCOUNTER (OUTPATIENT)
Age: 59
End: 2019-12-11

## 2019-12-12 ENCOUNTER — APPOINTMENT (OUTPATIENT)
Dept: ENDOVASCULAR SURGERY | Facility: CLINIC | Age: 59
End: 2019-12-12
Payer: MEDICAID

## 2019-12-12 VITALS
HEIGHT: 62 IN | WEIGHT: 124 LBS | TEMPERATURE: 97.9 F | RESPIRATION RATE: 16 BRPM | DIASTOLIC BLOOD PRESSURE: 72 MMHG | HEART RATE: 66 BPM | SYSTOLIC BLOOD PRESSURE: 106 MMHG | BODY MASS INDEX: 22.82 KG/M2

## 2019-12-12 DIAGNOSIS — T82.868A THROMBOSIS DUE VASCULAR PROSTHETIC DEVICES, IMPLANTS AND GRAFTS, INITIAL ENCOUNTER: ICD-10-CM

## 2019-12-12 PROCEDURE — 36905Z: CUSTOM

## 2019-12-12 NOTE — ASSESSMENT
[FreeTextEntry1] : PT with ESRD on HD experiencing "pulling clots" at HD for evaluation and possible intervention. PE demonstrates no thrill so will need to perform thrombectomy. Consent obtained.\par \par 2 mg tPA in at 11:15 AM.\par \par \par Successful declotting of LUE access.  New palpable thrill at the end of the procedure. May use access.  EBL = 15 cc.  FUll report to follow.

## 2019-12-12 NOTE — HISTORY OF PRESENT ILLNESS
[] : left basilic fistula [FreeTextEntry1] : alert and oriented\par accompanied by \par feels ok\par unable to get dialysis lasrt night\par no medications taken today\par No reported falls\par \par  [FreeTextEntry4] : 12/9/2019 [FreeTextEntry6] : Dr Koroma [FreeTextEntry5] : yesterday at 1000pm

## 2019-12-12 NOTE — PROCEDURE
[Resume diet] : resume diet [Site check for bleeding/hematoma/thrill/bruit] : Site check for bleeding/hematoma/thrill/bruit [Vital signs on admission the q 15 mins x2] : Vital signs on admission the q 15 mins x2 [D/C IV on discharge] : D/C IV on discharge [FreeTextEntry1] : left arm fistula thrombectomy [FreeTextEntry3] : TPA 2mg/3ml sterile water instilled into left arm fistula via 15cm Speed Lyser by Dr. Frederick at 11:15

## 2019-12-12 NOTE — REASON FOR VISIT
[Other ___] : a [unfilled] visit for [Other: ___] : [unfilled] [Family Member] : family member [Clotted Access] : clotted access

## 2019-12-27 ENCOUNTER — OUTPATIENT (OUTPATIENT)
Dept: OUTPATIENT SERVICES | Facility: HOSPITAL | Age: 59
LOS: 1 days | Discharge: HOME | End: 2019-12-27

## 2019-12-27 DIAGNOSIS — Z99.2 DEPENDENCE ON RENAL DIALYSIS: Chronic | ICD-10-CM

## 2019-12-27 DIAGNOSIS — D75.1 SECONDARY POLYCYTHEMIA: ICD-10-CM

## 2019-12-27 DIAGNOSIS — T85.898A OTHER SPECIFIED COMPLICATION OF OTHER INTERNAL PROSTHETIC DEVICES, IMPLANTS AND GRAFTS, INITIAL ENCOUNTER: Chronic | ICD-10-CM

## 2019-12-27 DIAGNOSIS — I77.0 ARTERIOVENOUS FISTULA, ACQUIRED: Chronic | ICD-10-CM

## 2020-01-02 ENCOUNTER — RESULT REVIEW (OUTPATIENT)
Age: 60
End: 2020-01-02

## 2020-01-02 ENCOUNTER — APPOINTMENT (OUTPATIENT)
Dept: HEMATOLOGY ONCOLOGY | Facility: CLINIC | Age: 60
End: 2020-01-02
Payer: MEDICAID

## 2020-01-02 VITALS
BODY MASS INDEX: 23.63 KG/M2 | SYSTOLIC BLOOD PRESSURE: 106 MMHG | HEART RATE: 82 BPM | DIASTOLIC BLOOD PRESSURE: 71 MMHG | TEMPERATURE: 97.9 F | WEIGHT: 129.19 LBS | RESPIRATION RATE: 17 BRPM | OXYGEN SATURATION: 98 %

## 2020-01-02 LAB
BASOPHILS # BLD AUTO: 0 K/UL — SIGNIFICANT CHANGE UP (ref 0–0.2)
BASOPHILS NFR BLD AUTO: 0.4 % — SIGNIFICANT CHANGE UP (ref 0–2)
EOSINOPHIL # BLD AUTO: 0.3 K/UL — SIGNIFICANT CHANGE UP (ref 0–0.5)
EOSINOPHIL NFR BLD AUTO: 4.1 % — SIGNIFICANT CHANGE UP (ref 0–6)
HCT VFR BLD CALC: 36.6 % — SIGNIFICANT CHANGE UP (ref 34.5–45)
HGB BLD-MCNC: 12.5 G/DL — SIGNIFICANT CHANGE UP (ref 11.5–15.5)
LYMPHOCYTES # BLD AUTO: 1 K/UL — SIGNIFICANT CHANGE UP (ref 1–3.3)
LYMPHOCYTES # BLD AUTO: 12.3 % — LOW (ref 13–44)
MCHC RBC-ENTMCNC: 34 G/DL — SIGNIFICANT CHANGE UP (ref 32–36)
MCHC RBC-ENTMCNC: 34.3 PG — HIGH (ref 27–34)
MCV RBC AUTO: 101 FL — HIGH (ref 80–100)
MONOCYTES # BLD AUTO: 0.3 K/UL — SIGNIFICANT CHANGE UP (ref 0–0.9)
MONOCYTES NFR BLD AUTO: 4.1 % — SIGNIFICANT CHANGE UP (ref 2–14)
NEUTROPHILS # BLD AUTO: 6.7 K/UL — SIGNIFICANT CHANGE UP (ref 1.8–7.4)
NEUTROPHILS NFR BLD AUTO: 79.2 % — HIGH (ref 43–77)
PLATELET # BLD AUTO: 250 K/UL — SIGNIFICANT CHANGE UP (ref 150–400)
RBC # BLD: 3.63 M/UL — LOW (ref 3.8–5.2)
RBC # FLD: 14.7 % — HIGH (ref 10.3–14.5)
WBC # BLD: 8.5 K/UL — SIGNIFICANT CHANGE UP (ref 3.8–10.5)
WBC # FLD AUTO: 8.5 K/UL — SIGNIFICANT CHANGE UP (ref 3.8–10.5)

## 2020-01-02 PROCEDURE — 99213 OFFICE O/P EST LOW 20 MIN: CPT

## 2020-01-02 NOTE — PHYSICAL EXAM
[Normal] : normal appearance, no rash, nodules, vesicles, ulcers, erythema [de-identified] : left arm dialysis fistula  [de-identified] : No HSM

## 2020-01-02 NOTE — HISTORY OF PRESENT ILLNESS
[de-identified] : This is a 59 year old woman with a history of MAK 2 + plycythemia vera.   diagnosed in 2012. 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 Hollie Guzmán in [place] (# [phone]). She has had a bone marrow biopsy done at the time of diagnosis.  Was previously non complaint with therapy there, however has been quite complaint with the hydroxyurea since coming to [facility] fellows clinic here in 2017.  Patient now follows with Dr. Jacky Guo after closure of the fellows clinic.  \par \par Abdominal US done May 2017 showed: attenuated main splenic vein indicative of previous/chronic thrombosis with surrounding patent varicosities; Portal venous system is patent with hepatopedal blood flow; Patent hepatic veins.\par \par She was admitted to [place] from July 3-5, 2018 due to hyperkalemia and thrombosis of her AVF. Hematology was consulted and recommended phlebotomy, but patient refused. She was discharged on Hydrea 500mg on HD days.  which she is still on. As of 9/27/19 she has transferred from the hematology fellows clinic to my service at [facility].  \par \par Does not feel much side effects from the hydrea, but gets nausea with the dialysis.  \par  [de-identified] : Patient returns for routine follow up. She states her compliance to the hydroxyurea 500mg 3 times a week, though she just ran out this past week.  Her increased  verifies this.  Patient taking a melatonin for sleep. \par Patient complains of some foot pains.  When standing up or walking. When pressing down does not hurt.  \par Dialysis going ok Was told her Hg was 11'sg/dl at one point last week.  Patient ran out of Hydrea last week off of it for 4-5 days now.   Did not miss it for too long.   HG 12.5 HCT 36.6% today 1/2/2020\par

## 2020-01-02 NOTE — ASSESSMENT
[FreeTextEntry1] : This is a 58 woman with a history of Polycythemia Vera, MAK 2 + since 2012. 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  She is now under my super vision for the P. Vera.  Today HCT 36.6% with Hg normal at 12.5g/dl today.  Hydroxyurea adequate in dose,  shes missed about 4-5 days after running out which does not seem to have much of an impact on her, though strongly recommended getting the next bottle and restarting as soon as she can.  \par \par Follow up in 3 months.

## 2020-01-24 ENCOUNTER — INPATIENT (INPATIENT)
Facility: HOSPITAL | Age: 60
LOS: 3 days | Discharge: HOME | DRG: 252 | End: 2020-01-28
Attending: HOSPITALIST | Admitting: HOSPITALIST
Payer: MEDICAID

## 2020-01-24 VITALS
WEIGHT: 130.07 LBS | SYSTOLIC BLOOD PRESSURE: 124 MMHG | HEART RATE: 64 BPM | DIASTOLIC BLOOD PRESSURE: 80 MMHG | OXYGEN SATURATION: 99 % | HEIGHT: 62 IN | TEMPERATURE: 98 F | RESPIRATION RATE: 18 BRPM

## 2020-01-24 DIAGNOSIS — T85.898A OTHER SPECIFIED COMPLICATION OF OTHER INTERNAL PROSTHETIC DEVICES, IMPLANTS AND GRAFTS, INITIAL ENCOUNTER: Chronic | ICD-10-CM

## 2020-01-24 DIAGNOSIS — Z99.2 DEPENDENCE ON RENAL DIALYSIS: Chronic | ICD-10-CM

## 2020-01-24 DIAGNOSIS — I77.0 ARTERIOVENOUS FISTULA, ACQUIRED: Chronic | ICD-10-CM

## 2020-01-24 LAB
APTT BLD: 36.1 SEC — SIGNIFICANT CHANGE UP (ref 27.5–36.3)
BASOPHILS # BLD AUTO: 0.05 K/UL — SIGNIFICANT CHANGE UP (ref 0–0.2)
BASOPHILS NFR BLD AUTO: 0.7 % — SIGNIFICANT CHANGE UP (ref 0–2)
EOSINOPHIL # BLD AUTO: 0.19 K/UL — SIGNIFICANT CHANGE UP (ref 0–0.5)
EOSINOPHIL NFR BLD AUTO: 2.5 % — SIGNIFICANT CHANGE UP (ref 0–6)
HCT VFR BLD CALC: 38.9 % — SIGNIFICANT CHANGE UP (ref 34.5–45)
HGB BLD-MCNC: 12.4 G/DL — SIGNIFICANT CHANGE UP (ref 11.5–15.5)
IMM GRANULOCYTES NFR BLD AUTO: 0.5 % — SIGNIFICANT CHANGE UP (ref 0–1.5)
INR BLD: 1.1 RATIO — SIGNIFICANT CHANGE UP (ref 0.88–1.16)
LYMPHOCYTES # BLD AUTO: 1.08 K/UL — SIGNIFICANT CHANGE UP (ref 1–3.3)
LYMPHOCYTES # BLD AUTO: 14.3 % — SIGNIFICANT CHANGE UP (ref 13–44)
MCHC RBC-ENTMCNC: 31.9 GM/DL — LOW (ref 32–36)
MCHC RBC-ENTMCNC: 33 PG — SIGNIFICANT CHANGE UP (ref 27–34)
MCV RBC AUTO: 103.5 FL — HIGH (ref 80–100)
MONOCYTES # BLD AUTO: 0.32 K/UL — SIGNIFICANT CHANGE UP (ref 0–0.9)
MONOCYTES NFR BLD AUTO: 4.2 % — SIGNIFICANT CHANGE UP (ref 2–14)
NEUTROPHILS # BLD AUTO: 5.86 K/UL — SIGNIFICANT CHANGE UP (ref 1.8–7.4)
NEUTROPHILS NFR BLD AUTO: 77.8 % — HIGH (ref 43–77)
NRBC # BLD: 0 /100 WBCS — SIGNIFICANT CHANGE UP (ref 0–0)
PLATELET # BLD AUTO: 262 K/UL — SIGNIFICANT CHANGE UP (ref 150–400)
PROTHROM AB SERPL-ACNC: 12.7 SEC — SIGNIFICANT CHANGE UP (ref 10–12.9)
RBC # BLD: 3.76 M/UL — LOW (ref 3.8–5.2)
RBC # FLD: 15.4 % — HIGH (ref 10.3–14.5)
WBC # BLD: 7.54 K/UL — SIGNIFICANT CHANGE UP (ref 3.8–10.5)
WBC # FLD AUTO: 7.54 K/UL — SIGNIFICANT CHANGE UP (ref 3.8–10.5)

## 2020-01-24 PROCEDURE — 93010 ELECTROCARDIOGRAM REPORT: CPT

## 2020-01-24 PROCEDURE — 99285 EMERGENCY DEPT VISIT HI MDM: CPT

## 2020-01-24 PROCEDURE — 71046 X-RAY EXAM CHEST 2 VIEWS: CPT | Mod: 26

## 2020-01-24 NOTE — ED PROVIDER NOTE - INTERPRETATION
normal sinus rhythm, Normal axis, Normal MS interval and QRS complex. There are no acute ischemic ST or T-wave changes./borderline bradycardia

## 2020-01-24 NOTE — ED PROVIDER NOTE - ATTENDING CONTRIBUTION TO CARE
------------ATTENDING NOTE------------  pt w/ daughter c/o missing HD today (MWF, compliant full session on W) due to clotted L AVF, pt otherwise asymptomatic, no chest pain/discomfort or SOB/dyspnea, nml VS, awaiting labs/imaging and d/w Nephrology and Vasc Surgery -->  - Kota Matias MD   ------------------------------------------------- ------------ATTENDING NOTE------------  pt w/ daughter c/o missing HD today (MWF, compliant full session on W) due to clotted L AVF, pt otherwise asymptomatic, no chest pain/discomfort or SOB/dyspnea, nml VS, awaiting labs/imaging and d/w Nephrology and Vasc Surgery --> HD stable, EKG wnl, pending results/consults at ED Sign Out midnight.  - Kota Matias MD   -------------------------------------------------

## 2020-01-24 NOTE — ED PROVIDER NOTE - CLINICAL SUMMARY MEDICAL DECISION MAKING FREE TEXT BOX
see MD note 59 F with ESRD on HD, PCV on chemo presents for clogged LUE AVF.  Will need vascular consult.  No need for urgent HD at this time.

## 2020-01-24 NOTE — ED PROVIDER NOTE - PHYSICAL EXAMINATION
PHYSICAL EXAM:  GENERAL: NAD  HEAD:  Atraumatic, Normocephalic  EYES: EOMI, PERRLA, conjunctiva and sclera clear  ENMT: No tonsillar erythema, exudates, or enlargement; Moist mucous membranes, Good dentition, No lesions  NECK: Supple, No JVD, Normal thyroid  CHEST/LUNG: Clear to percussion bilaterally; No rales, rhonchi, wheezing, or rubs  HEART: Regular rate and rhythm; No murmurs, rubs, or gallops  ABDOMEN: Soft, Nontender, Nondistended; Bowel sounds present  EXTREMITIES:  no LE edema; LUE fistula in tact, no audible bruit upon auscultation   LYMPH: No lymphadenopathy noted  SKIN: No rashes or lesions  NERVOUS SYSTEM:  Alert & Oriented X3, Good concentration; non-focal

## 2020-01-24 NOTE — ED PROVIDER NOTE - NS ED ROS FT
REVIEW OF SYSTEMS:    CONSTITUTIONAL: No weakness, fevers or chills  EYES/ENT: No visual changes;  No vertigo or throat pain   NECK: No pain or stiffness  RESPIRATORY: No cough, wheezing, hemoptysis; No shortness of breath  CARDIOVASCULAR: No chest pain or palpitations  GASTROINTESTINAL: No abdominal or epigastric pain. No nausea, vomiting, or hematemesis; No diarrhea or constipation. No melena or hematochezia.  GENITOURINARY: No dysuria, frequency or hematuria; + clogged LUE AVF  NEUROLOGICAL: No numbness or weakness  SKIN: No itching, burning, rashes, or lesions   All other review of systems is negative unless indicated above.

## 2020-01-24 NOTE — ED PROVIDER NOTE - OBJECTIVE STATEMENT
59 F w/ ESRD on HD w/ LUE AVF, PCV on hydrea presents today after not being able to get dialysis due to fistula being clogged.  Pt has mild fronto and temporal HA.  ROS otherwise negative; denies f/c/nausea/vomiting/CP/SOB; pt still makes urine.

## 2020-01-25 DIAGNOSIS — Z29.9 ENCOUNTER FOR PROPHYLACTIC MEASURES, UNSPECIFIED: ICD-10-CM

## 2020-01-25 DIAGNOSIS — D45 POLYCYTHEMIA VERA: ICD-10-CM

## 2020-01-25 DIAGNOSIS — I26.99 OTHER PULMONARY EMBOLISM WITHOUT ACUTE COR PULMONALE: ICD-10-CM

## 2020-01-25 DIAGNOSIS — I10 ESSENTIAL (PRIMARY) HYPERTENSION: ICD-10-CM

## 2020-01-25 DIAGNOSIS — T82.868A THROMBOSIS DUE TO VASCULAR PROSTHETIC DEVICES, IMPLANTS AND GRAFTS, INITIAL ENCOUNTER: ICD-10-CM

## 2020-01-25 DIAGNOSIS — N18.6 END STAGE RENAL DISEASE: ICD-10-CM

## 2020-01-25 DIAGNOSIS — T82.598A OTHER MECHANICAL COMPLICATION OF OTHER CARDIAC AND VASCULAR DEVICES AND IMPLANTS, INITIAL ENCOUNTER: ICD-10-CM

## 2020-01-25 DIAGNOSIS — E87.5 HYPERKALEMIA: ICD-10-CM

## 2020-01-25 DIAGNOSIS — E83.39 OTHER DISORDERS OF PHOSPHORUS METABOLISM: ICD-10-CM

## 2020-01-25 LAB
ALBUMIN SERPL ELPH-MCNC: 4 G/DL — SIGNIFICANT CHANGE UP (ref 3.3–5)
ALBUMIN SERPL ELPH-MCNC: 4.5 G/DL — SIGNIFICANT CHANGE UP (ref 3.3–5)
ALP SERPL-CCNC: 125 U/L — HIGH (ref 40–120)
ALP SERPL-CCNC: 136 U/L — HIGH (ref 40–120)
ALT FLD-CCNC: 10 U/L — SIGNIFICANT CHANGE UP (ref 10–45)
ALT FLD-CCNC: 13 U/L — SIGNIFICANT CHANGE UP (ref 10–45)
ANION GAP SERPL CALC-SCNC: 15 MMOL/L — SIGNIFICANT CHANGE UP (ref 5–17)
ANION GAP SERPL CALC-SCNC: 17 MMOL/L — SIGNIFICANT CHANGE UP (ref 5–17)
ANION GAP SERPL CALC-SCNC: 18 MMOL/L — HIGH (ref 5–17)
APTT BLD: 36 SEC — SIGNIFICANT CHANGE UP (ref 27.5–36.3)
AST SERPL-CCNC: 12 U/L — SIGNIFICANT CHANGE UP (ref 10–40)
AST SERPL-CCNC: 21 U/L — SIGNIFICANT CHANGE UP (ref 10–40)
BASE EXCESS BLDV CALC-SCNC: -1.5 MMOL/L — SIGNIFICANT CHANGE UP (ref -2–2)
BILIRUB SERPL-MCNC: 1.5 MG/DL — HIGH (ref 0.2–1.2)
BILIRUB SERPL-MCNC: 1.7 MG/DL — HIGH (ref 0.2–1.2)
BLD GP AB SCN SERPL QL: NEGATIVE — SIGNIFICANT CHANGE UP
BUN SERPL-MCNC: 46 MG/DL — HIGH (ref 7–23)
BUN SERPL-MCNC: 53 MG/DL — HIGH (ref 7–23)
BUN SERPL-MCNC: 56 MG/DL — HIGH (ref 7–23)
CA-I SERPL-SCNC: 1.22 MMOL/L — SIGNIFICANT CHANGE UP (ref 1.12–1.3)
CALCIUM SERPL-MCNC: 8.9 MG/DL — SIGNIFICANT CHANGE UP (ref 8.4–10.5)
CALCIUM SERPL-MCNC: 9.3 MG/DL — SIGNIFICANT CHANGE UP (ref 8.4–10.5)
CALCIUM SERPL-MCNC: 9.7 MG/DL — SIGNIFICANT CHANGE UP (ref 8.4–10.5)
CHLORIDE BLDV-SCNC: 104 MMOL/L — SIGNIFICANT CHANGE UP (ref 96–108)
CHLORIDE SERPL-SCNC: 101 MMOL/L — SIGNIFICANT CHANGE UP (ref 96–108)
CHLORIDE SERPL-SCNC: 96 MMOL/L — SIGNIFICANT CHANGE UP (ref 96–108)
CHLORIDE SERPL-SCNC: 98 MMOL/L — SIGNIFICANT CHANGE UP (ref 96–108)
CO2 BLDV-SCNC: 23 MMOL/L — SIGNIFICANT CHANGE UP (ref 22–30)
CO2 SERPL-SCNC: 20 MMOL/L — LOW (ref 22–31)
CO2 SERPL-SCNC: 20 MMOL/L — LOW (ref 22–31)
CO2 SERPL-SCNC: 21 MMOL/L — LOW (ref 22–31)
CREAT SERPL-MCNC: 9.01 MG/DL — HIGH (ref 0.5–1.3)
CREAT SERPL-MCNC: 9.13 MG/DL — HIGH (ref 0.5–1.3)
CREAT SERPL-MCNC: 9.82 MG/DL — HIGH (ref 0.5–1.3)
GAS PNL BLDV: 136 MMOL/L — SIGNIFICANT CHANGE UP (ref 135–145)
GAS PNL BLDV: SIGNIFICANT CHANGE UP
GAS PNL BLDV: SIGNIFICANT CHANGE UP
GLUCOSE BLDC GLUCOMTR-MCNC: 202 MG/DL — HIGH (ref 70–99)
GLUCOSE BLDV-MCNC: 91 MG/DL — SIGNIFICANT CHANGE UP (ref 70–99)
GLUCOSE SERPL-MCNC: 102 MG/DL — HIGH (ref 70–99)
GLUCOSE SERPL-MCNC: 103 MG/DL — HIGH (ref 70–99)
GLUCOSE SERPL-MCNC: 284 MG/DL — HIGH (ref 70–99)
HBV SURFACE AB SER-ACNC: REACTIVE
HCO3 BLDV-SCNC: 22 MMOL/L — SIGNIFICANT CHANGE UP (ref 21–29)
HCT VFR BLD CALC: 38.7 % — SIGNIFICANT CHANGE UP (ref 34.5–45)
HCT VFR BLDA CALC: 36 % — LOW (ref 39–50)
HGB BLD CALC-MCNC: 11.8 G/DL — SIGNIFICANT CHANGE UP (ref 11.5–15.5)
HGB BLD-MCNC: 12.2 G/DL — SIGNIFICANT CHANGE UP (ref 11.5–15.5)
INR BLD: 1.15 RATIO — SIGNIFICANT CHANGE UP (ref 0.88–1.16)
LACTATE BLDV-MCNC: 2.2 MMOL/L — HIGH (ref 0.7–2)
MAGNESIUM SERPL-MCNC: 2.7 MG/DL — HIGH (ref 1.6–2.6)
MAGNESIUM SERPL-MCNC: 2.8 MG/DL — HIGH (ref 1.6–2.6)
MCHC RBC-ENTMCNC: 31.5 GM/DL — LOW (ref 32–36)
MCHC RBC-ENTMCNC: 32.9 PG — SIGNIFICANT CHANGE UP (ref 27–34)
MCV RBC AUTO: 104.3 FL — HIGH (ref 80–100)
NRBC # BLD: 0 /100 WBCS — SIGNIFICANT CHANGE UP (ref 0–0)
OTHER CELLS CSF MANUAL: 16 ML/DL — LOW (ref 18–22)
PCO2 BLDV: 34 MMHG — LOW (ref 35–50)
PH BLDV: 7.43 — SIGNIFICANT CHANGE UP (ref 7.35–7.45)
PHOSPHATE SERPL-MCNC: 3.4 MG/DL — SIGNIFICANT CHANGE UP (ref 2.5–4.5)
PHOSPHATE SERPL-MCNC: 3.7 MG/DL — SIGNIFICANT CHANGE UP (ref 2.5–4.5)
PLATELET # BLD AUTO: 200 K/UL — SIGNIFICANT CHANGE UP (ref 150–400)
PO2 BLDV: 86 MMHG — HIGH (ref 25–45)
POTASSIUM BLDV-SCNC: 5.7 MMOL/L — HIGH (ref 3.5–5.3)
POTASSIUM SERPL-MCNC: 5.3 MMOL/L — SIGNIFICANT CHANGE UP (ref 3.5–5.3)
POTASSIUM SERPL-MCNC: 5.7 MMOL/L — HIGH (ref 3.5–5.3)
POTASSIUM SERPL-MCNC: 5.8 MMOL/L — HIGH (ref 3.5–5.3)
POTASSIUM SERPL-SCNC: 5.3 MMOL/L — SIGNIFICANT CHANGE UP (ref 3.5–5.3)
POTASSIUM SERPL-SCNC: 5.7 MMOL/L — HIGH (ref 3.5–5.3)
POTASSIUM SERPL-SCNC: 5.8 MMOL/L — HIGH (ref 3.5–5.3)
PROT SERPL-MCNC: 7.4 G/DL — SIGNIFICANT CHANGE UP (ref 6–8.3)
PROT SERPL-MCNC: 8.1 G/DL — SIGNIFICANT CHANGE UP (ref 6–8.3)
PROTHROM AB SERPL-ACNC: 13.2 SEC — HIGH (ref 10–12.9)
RBC # BLD: 3.71 M/UL — LOW (ref 3.8–5.2)
RBC # FLD: 15.1 % — HIGH (ref 10.3–14.5)
RH IG SCN BLD-IMP: POSITIVE — SIGNIFICANT CHANGE UP
SAO2 % BLDV: 97 % — HIGH (ref 67–88)
SODIUM SERPL-SCNC: 134 MMOL/L — LOW (ref 135–145)
SODIUM SERPL-SCNC: 136 MMOL/L — SIGNIFICANT CHANGE UP (ref 135–145)
SODIUM SERPL-SCNC: 136 MMOL/L — SIGNIFICANT CHANGE UP (ref 135–145)
WBC # BLD: 5.77 K/UL — SIGNIFICANT CHANGE UP (ref 3.8–10.5)
WBC # FLD AUTO: 5.77 K/UL — SIGNIFICANT CHANGE UP (ref 3.8–10.5)

## 2020-01-25 PROCEDURE — 71045 X-RAY EXAM CHEST 1 VIEW: CPT | Mod: 26

## 2020-01-25 PROCEDURE — 93010 ELECTROCARDIOGRAM REPORT: CPT | Mod: 76

## 2020-01-25 PROCEDURE — 99223 1ST HOSP IP/OBS HIGH 75: CPT | Mod: GC

## 2020-01-25 PROCEDURE — 99222 1ST HOSP IP/OBS MODERATE 55: CPT | Mod: GC

## 2020-01-25 PROCEDURE — 12345: CPT | Mod: NC,GC

## 2020-01-25 RX ORDER — ALBUTEROL 90 UG/1
10 AEROSOL, METERED ORAL ONCE
Refills: 0 | Status: COMPLETED | OUTPATIENT
Start: 2020-01-25 | End: 2020-01-25

## 2020-01-25 RX ORDER — HYDROMORPHONE HYDROCHLORIDE 2 MG/ML
0.2 INJECTION INTRAMUSCULAR; INTRAVENOUS; SUBCUTANEOUS ONCE
Refills: 0 | Status: DISCONTINUED | OUTPATIENT
Start: 2020-01-25 | End: 2020-01-25

## 2020-01-25 RX ORDER — ACETAMINOPHEN 500 MG
650 TABLET ORAL EVERY 6 HOURS
Refills: 0 | Status: DISCONTINUED | OUTPATIENT
Start: 2020-01-25 | End: 2020-01-28

## 2020-01-25 RX ORDER — HEPARIN SODIUM 5000 [USP'U]/ML
3 INJECTION INTRAVENOUS; SUBCUTANEOUS ONCE
Refills: 0 | Status: COMPLETED | OUTPATIENT
Start: 2020-01-25 | End: 2020-01-25

## 2020-01-25 RX ORDER — ACETAMINOPHEN 500 MG
650 TABLET ORAL EVERY 6 HOURS
Refills: 0 | Status: DISCONTINUED | OUTPATIENT
Start: 2020-01-25 | End: 2020-01-25

## 2020-01-25 RX ORDER — INSULIN HUMAN 100 [IU]/ML
10 INJECTION, SOLUTION SUBCUTANEOUS ONCE
Refills: 0 | Status: COMPLETED | OUTPATIENT
Start: 2020-01-25 | End: 2020-01-25

## 2020-01-25 RX ORDER — ALBUTEROL 90 UG/1
2.5 AEROSOL, METERED ORAL ONCE
Refills: 0 | Status: DISCONTINUED | OUTPATIENT
Start: 2020-01-25 | End: 2020-01-25

## 2020-01-25 RX ORDER — HYDROXYUREA 500 MG/1
500 CAPSULE ORAL
Refills: 0 | Status: DISCONTINUED | OUTPATIENT
Start: 2020-01-25 | End: 2020-01-28

## 2020-01-25 RX ORDER — SODIUM ZIRCONIUM CYCLOSILICATE 10 G/10G
10 POWDER, FOR SUSPENSION ORAL ONCE
Refills: 0 | Status: COMPLETED | OUTPATIENT
Start: 2020-01-25 | End: 2020-01-25

## 2020-01-25 RX ORDER — OXYCODONE HYDROCHLORIDE 5 MG/1
2.5 TABLET ORAL ONCE
Refills: 0 | Status: DISCONTINUED | OUTPATIENT
Start: 2020-01-25 | End: 2020-01-25

## 2020-01-25 RX ORDER — CALCIUM ACETATE 667 MG
667 TABLET ORAL
Refills: 0 | Status: DISCONTINUED | OUTPATIENT
Start: 2020-01-25 | End: 2020-01-25

## 2020-01-25 RX ORDER — AMLODIPINE BESYLATE 2.5 MG/1
1 TABLET ORAL
Qty: 0 | Refills: 0 | DISCHARGE

## 2020-01-25 RX ORDER — CALCIUM GLUCONATE 100 MG/ML
1 VIAL (ML) INTRAVENOUS ONCE
Refills: 0 | Status: COMPLETED | OUTPATIENT
Start: 2020-01-25 | End: 2020-01-25

## 2020-01-25 RX ORDER — FUROSEMIDE 40 MG
3 TABLET ORAL
Qty: 0 | Refills: 0 | DISCHARGE

## 2020-01-25 RX ORDER — CALCIUM ACETATE 667 MG
1334 TABLET ORAL EVERY 8 HOURS
Refills: 0 | Status: DISCONTINUED | OUTPATIENT
Start: 2020-01-25 | End: 2020-01-25

## 2020-01-25 RX ORDER — DEXTROSE 50 % IN WATER 50 %
50 SYRINGE (ML) INTRAVENOUS ONCE
Refills: 0 | Status: COMPLETED | OUTPATIENT
Start: 2020-01-25 | End: 2020-01-25

## 2020-01-25 RX ORDER — SODIUM ZIRCONIUM CYCLOSILICATE 10 G/10G
10 POWDER, FOR SUSPENSION ORAL THREE TIMES A DAY
Refills: 0 | Status: DISCONTINUED | OUTPATIENT
Start: 2020-01-25 | End: 2020-01-25

## 2020-01-25 RX ORDER — CALCIUM ACETATE 667 MG
1334 TABLET ORAL
Refills: 0 | Status: DISCONTINUED | OUTPATIENT
Start: 2020-01-25 | End: 2020-01-28

## 2020-01-25 RX ORDER — HEPARIN SODIUM 5000 [USP'U]/ML
5000 INJECTION INTRAVENOUS; SUBCUTANEOUS EVERY 12 HOURS
Refills: 0 | Status: DISCONTINUED | OUTPATIENT
Start: 2020-01-25 | End: 2020-01-28

## 2020-01-25 RX ADMIN — ALBUTEROL 10 MILLIGRAM(S): 90 AEROSOL, METERED ORAL at 11:08

## 2020-01-25 RX ADMIN — Medication 100 GRAM(S): at 13:28

## 2020-01-25 RX ADMIN — Medication 650 MILLIGRAM(S): at 16:18

## 2020-01-25 RX ADMIN — SODIUM ZIRCONIUM CYCLOSILICATE 10 GRAM(S): 10 POWDER, FOR SUSPENSION ORAL at 11:28

## 2020-01-25 RX ADMIN — HYDROMORPHONE HYDROCHLORIDE 0.2 MILLIGRAM(S): 2 INJECTION INTRAMUSCULAR; INTRAVENOUS; SUBCUTANEOUS at 23:53

## 2020-01-25 RX ADMIN — OXYCODONE HYDROCHLORIDE 2.5 MILLIGRAM(S): 5 TABLET ORAL at 15:04

## 2020-01-25 RX ADMIN — Medication 650 MILLIGRAM(S): at 11:40

## 2020-01-25 RX ADMIN — HYDROMORPHONE HYDROCHLORIDE 0.2 MILLIGRAM(S): 2 INJECTION INTRAMUSCULAR; INTRAVENOUS; SUBCUTANEOUS at 21:58

## 2020-01-25 RX ADMIN — OXYCODONE HYDROCHLORIDE 2.5 MILLIGRAM(S): 5 TABLET ORAL at 12:16

## 2020-01-25 RX ADMIN — INSULIN HUMAN 10 UNIT(S): 100 INJECTION, SOLUTION SUBCUTANEOUS at 11:31

## 2020-01-25 RX ADMIN — Medication 50 MILLILITER(S): at 11:19

## 2020-01-25 RX ADMIN — HEPARIN SODIUM 3 UNIT(S): 5000 INJECTION INTRAVENOUS; SUBCUTANEOUS at 10:45

## 2020-01-25 NOTE — H&P ADULT - NSHPPHYSICALEXAM_GEN_ALL_CORE
Vital Signs Last 24 Hrs  T(C): 36.8 (25 Jan 2020 04:04), Max: 36.8 (24 Jan 2020 19:14)  T(F): 98.2 (25 Jan 2020 04:04), Max: 98.2 (24 Jan 2020 19:14)  HR: 72 (25 Jan 2020 04:04) (62 - 72)  BP: 117/74 (25 Jan 2020 04:04) (117/74 - 135/81)  BP(mean): --  RR: 18 (25 Jan 2020 04:04) (18 - 18)  SpO2: 99% (25 Jan 2020 04:04) (97% - 99%)    GENERAL: NAD, well-developed  HEAD:  Atraumatic, Normocephalic  EYES: EOMI, PERRLA, conjunctiva and sclera clear  NECK: Supple, No JVD  CHEST/LUNG: Clear to auscultation bilaterally; No wheeze  HEART: Regular rate and rhythm; No murmurs, rubs, or gallops  ABDOMEN: Soft, Nontender, Nondistended; Bowel sounds present  EXTREMITIES:  2+ Peripheral Pulses, No clubbing, cyanosis, or edema  PSYCH: AAOx3  NEUROLOGY: non-focal  SKIN: No rashes or lesions Vital Signs Last 24 Hrs  T(C): 36.8 (25 Jan 2020 04:04), Max: 36.8 (24 Jan 2020 19:14)  T(F): 98.2 (25 Jan 2020 04:04), Max: 98.2 (24 Jan 2020 19:14)  HR: 72 (25 Jan 2020 04:04) (62 - 72)  BP: 117/74 (25 Jan 2020 04:04) (117/74 - 135/81)  BP(mean): --  RR: 18 (25 Jan 2020 04:04) (18 - 18)  SpO2: 99% (25 Jan 2020 04:04) (97% - 99%)    GENERAL: NAD, well-developed  HEAD:  Atraumatic, Normocephalic  EYES: EOMI, PERRLA, conjunctiva and sclera clear  NECK: Supple, No JVD  CHEST/LUNG: Clear to auscultation bilaterally; No wheeze  HEART: Regular rate and rhythm; No murmurs, rubs, or gallops  ABDOMEN: Soft, Nontender, Nondistended; Bowel sounds present  EXTREMITIES:  2+ Peripheral Pulses, No clubbing, cyanosis, or edema  PSYCH: AAOx3. Normal behavior and affect.  NEUROLOGY: non-focal. moving all extrem. normal sensation and strength.  SKIN: No rashes or lesions

## 2020-01-25 NOTE — H&P ADULT - HISTORY OF PRESENT ILLNESS
58 yo F with Pmhx of of MAK 2 + plycythemia vera. diagnosed in 2012. Complicated by with splenomegaly and history of splenic vein thrombosis (2015), ESRD on HD (Tu/Th/Sat) via LUE AVF (2/2 chronic GN, started Dec 2017), HTN presents to the ED with AVF clot. Currently, patient denies any new symptoms or complaints. She was supposed to undergo HD on 1/24/2020 but was unable to due to the clot. She follows up with Dr. Larsen. Otherwise, she takes hydroxyurea 500 mg TID with HD. She has not had any complications.     In the Ed, her vitals were WNL. Her labs were significant high Scr and lactic acidosis. CXR showed no abnormal findings. 60 yo F with Pmhx of of MAK 2 + plycythemia vera. diagnosed in 2012. Complicated by with splenomegaly and history of splenic vein thrombosis (2015), ESRD on HD (MWF) via LUE AVF (2/2 chronic GN, started Dec 2017), HTN presents to the ED with AVF clot. Currently, patient denies any new symptoms or complaints. She was supposed to undergo HD on 1/24/2020 but was unable to due to the clot. She follows up with Dr. Larsen. Otherwise, she takes hydroxyurea 500 mg TID with HD. She has not had any complications.     In the Ed, her vitals were WNL. Her labs were significant high Scr and lactic acidosis. CXR showed no abnormal findings. 60 yo F with Pmhx of of MAK 2 + plycythemia vera (diagnosed in 2012), c/b splenomegaly and splenic vein thrombosis (2015), ESRD on HD (MWF) via LUE AVF (2/2 chronic GN, started Dec 2017), pancreatic cyst, cirrhosis w/ grade 1 gastric varices of unclear etiology, PUD, HTN presents to the ED with AVF clot.     Daughter at bedside providing collateral. Pt states that she had the same problem with clotted AVF 1 month ago and also July 2018. Currently, patient denies any new symptoms or complaints. She was supposed to undergo HD on 1/24/2020 but was unable to due to the clot. She follows up with Dr. De León. Otherwise, she takes hydroxyurea 500 mg TID post HD. Pt's only other complaint was "itching" or tingling of her left hand finger tips yesterday, which has since resolved. She has not had any other complications.     In the Ed, her vitals were WNL. Her labs were significant high Scr and lactic acidosis. CXR showed no abnormal findings.

## 2020-01-25 NOTE — H&P ADULT - PROBLEM SELECTOR PLAN 1
AVF clotted in the setting of polycythemia vera   -Vascular surgery recs appreciated. F/u with IR for possible thrombectomy as she had thrombectomy in the past AVF clotted in the setting of polycythemia vera   -Vascular surgery recs appreciated. F/u with IR for possible thrombectomy as she had thrombectomy in the past  - call IR in AM by primary team  - currently NPO for poss procedure.

## 2020-01-25 NOTE — H&P ADULT - NSICDXPASTMEDICALHX_GEN_ALL_CORE_FT
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

## 2020-01-25 NOTE — CONSULT NOTE ADULT - PROBLEM SELECTOR RECOMMENDATION 9
Pt. with hyperkalemia in setting of ESRD with missed HD due to clotted AVF. Serum potassium upon admission was 5.7. Hyperkalemia has responded to medical management with Insulin, D50, and 1 dose Lokelma. Repeat serum potassium is 5.3. Will arrange for urgent HD today. Low potassium diet advised. Monitor serum potassium

## 2020-01-25 NOTE — PROGRESS NOTE ADULT - PROBLEM SELECTOR PLAN 4
DVT -heparin subQ  -NPO for procedure     Transitions of Care Status:  1.  Name of PCP: Dr. Los Lemus    2.  PCP Contacted on Admission: [ ] Y    [x ] N    3.  PCP contacted at Discharge: [ ] Y    [ ] N    [ ] N/A  4.  Post-Discharge Appointment Date and Location:  5.  Summary of Handoff given to PCP: DVT -heparin subQ    Transitions of Care Status:  1.  Name of PCP: Dr. Los Lemus    2.  PCP Contacted on Admission: [ ] Y    [x ] N    3.  PCP contacted at Discharge: [ ] Y    [ ] N    [ ] N/A  4.  Post-Discharge Appointment Date and Location:  5.  Summary of Handoff given to PCP:

## 2020-01-25 NOTE — H&P ADULT - NSICDXPASTSURGICALHX_GEN_ALL_CORE_FT
PAST SURGICAL HISTORY:  AV fistula Placed 9/18    Hemoperitoneum as complication of peritoneal dialysis s/p removal 9/18    Peritoneal dialysis catheter in place Placed 8/9/2017

## 2020-01-25 NOTE — CONSULT NOTE ADULT - SUBJECTIVE AND OBJECTIVE BOX
CC: Patient is a 59y old  Female who presents with a chief complaint of non-functioning AVF.     HPI:  59F ESRD on HD (MWF), PCV s/p l. basilic AVF (9-2017),s/p multiple prior thrombectomies now presents to ED after failed HD session secondary to non-functioning AVF. Patient denies any tingling / numbness / pain / weakness in the LUE or l. hand.     REVIEW OF SYSTEMS:  General: denies weight change, fever or fatigue  Respiratory: denies shortness of breath  Cardiovascular: denies chest pain, palpitations  Gastrointestinal: denies nausea, vomiting,     PAST MEDICAL & SURGICAL HISTORY:  Pancreatic cyst  Cirrhosis of liver without ascites, unspecified hepatic cirrhosis type  ESRD on peritoneal dialysis  Splenic infarct: treated conservatively 2/2015  Splenomegaly: 2015  Hypertension  Peptic ulcer disease  Polycythemia vera  AV fistula: Placed 9/18  Hemoperitoneum as complication of peritoneal dialysis: s/p removal 9/18  Peritoneal dialysis catheter in place: Placed 8/9/2017    ALLERGIES:  No Known Allergies or Intolerances    FAMILY HISTORY:  Family history of malignant neoplasm (Grandparent): head and neck in father  Family history of hypertension in mother    Objective:   Vital Signs Last 24 Hrs  T(C): 36.7 (24 Jan 2020 23:19), Max: 36.8 (24 Jan 2020 19:14)  T(F): 98 (24 Jan 2020 23:19), Max: 98.2 (24 Jan 2020 19:14)  HR: 62 (24 Jan 2020 23:19) (62 - 64)  BP: 135/81 (24 Jan 2020 23:19) (124/80 - 135/81)  RR: 18 (24 Jan 2020 23:19) (18 - 18)  SpO2: 97% (24 Jan 2020 23:19) (97% - 99%)    Physical Exam:  General: Well developed, well nourished, alert and cooperative, and appears to be in no acute distress.  HEENT: normocephalic, vision is grossly intact  Chest: respirations grossly unlabored laying flat on RA  Abdomen: soft, non-distended, non-tender, no guarding or rebound  Extremities:   LUE: Basilic AVF palpated, no thrill. Hand is warm. +Radial pulse. Full sensation and motor grossly intact.     LABS:                        12.4   7.54  )-----------( 262      ( 24 Jan 2020 23:26 )             38.9     01-25    136  |  101  |  53<H>  ----------------------------<  102<H>  5.8<H>   |  20<L>  |  9.01<H>    Ca    9.3      25 Jan 2020 01:19  Phos  3.4     01-24  Mg     2.8     01-24    TPro  8.1  /  Alb  4.5  /  TBili  1.5<H>  /  DBili  x   /  AST  21  /  ALT  13  /  AlkPhos  136<H>  01-24    PT/INR - ( 24 Jan 2020 23:26 )   PT: 12.7 sec;   INR: 1.10 ratio    PTT - ( 24 Jan 2020 23:26 )  PTT:36.1 sec    LIVER FUNCTIONS - ( 24 Jan 2020 23:26 )  Alb: 4.5 g/dL / Pro: 8.1 g/dL / ALK PHOS: 136 U/L / ALT: 13 U/L / AST: 21 U/L / GGT: x             RADIOLOGY & ADDITIONAL STUDIES:    CXR reviewed by vascular surgery team.

## 2020-01-25 NOTE — H&P ADULT - NSICDXFAMILYHX_GEN_ALL_CORE_FT
FAMILY HISTORY:  Family history of hypertension in mother    Grandparent  Still living? No  Family history of malignant neoplasm, Age at diagnosis: Age Unknown

## 2020-01-25 NOTE — CONSULT NOTE ADULT - ASSESSMENT
59F ESRD on HD (MWF) presents with failed HD session earlier today secondary to non-functioning AVF.     - no acute vascular surgery intervention at present time  - no indication for vascular surgery admission at present time  - patient has had IR thrombectomy in , would suggest urgent IR consult and possible thrombectomy this AM.  - suggest nephrology consult for HD recommendations  - plan to be discussed with vascular surgery fellow.    Please contact Vascular Surgery (P: [phone]) with any questions.    HALLE Mccann MD, PGY-II  Surgery, Vascular Team  Pager: [phone]  [facility] 59F ESRD on HD (MWF) presents with failed HD session earlier today secondary to non-functioning AVF.     - no acute vascular surgery intervention at present time  - no indication for vascular surgery admission at present time  - patient has had IR thrombectomy in , would suggest urgent IR consult and possible thrombectomy this AM.  - suggest nephrology consult for HD recommendations    Plan discussed with vascular surgery fellow REJI Bailon MD.     Please contact Vascular Surgery (P: [phone]) with any questions.    HALLE Mccann MD, PGY-II  Surgery, Vascular Team  Pager: [phone]  [facility]

## 2020-01-25 NOTE — ED ADULT NURSE REASSESSMENT NOTE - NS ED NURSE REASSESS COMMENT FT1
Pt resting quietly on stretcher in no distress, repeat BMP sent, awaiting results at this time. Safety and comfort measures maintained, bed locked and in lowest position. Family at bedside.

## 2020-01-25 NOTE — CONSULT NOTE ADULT - ASSESSMENT
Patient with ESRD on HD TIW presents to [facility] with hyperkalemia and clotted AVF, requires urgent HD.

## 2020-01-25 NOTE — PROGRESS NOTE ADULT - PROBLEM SELECTOR PLAN 3
on treatment   -C/w hydroxyurea 500 mg TID with HD   -F/u with Dr. Guo PCV c/b splenomegaly and splenic vein thrombosis w/ infarct on home hydroxyurea  -C/w hydroxyurea 500 mg TID with HD  -f/u outpt hematologist Dr. Guo

## 2020-01-25 NOTE — H&P ADULT - PROBLEM SELECTOR PLAN 4
DVT -heparin subQ  -NPO for procedure     Transitions of Care Status:  1.  Name of PCP: Dr. Los Lemus    2.  PCP Contacted on Admission: [ ] Y    [x ] N    3.  PCP contacted at Discharge: [ ] Y    [ ] N    [ ] N/A  4.  Post-Discharge Appointment Date and Location:  5.  Summary of Handoff given to PCP:

## 2020-01-25 NOTE — H&P ADULT - ASSESSMENT
58 yo F with Pmhx of of MAK 2 + plycythemia vera. diagnosed in 2012. Complicated by with splenomegaly and history of splenic vein thrombosis (2015), ESRD on HD (Tu/Th/Sat) via LUE AVF (2/2 chronic GN, started Dec 2017), HTN presents to the ED with AVF clot 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 splenomegaly and splenic vein thrombosis (2015), ESRD on HD (MWF) via LUE AVF (2/2 chronic GN, started Dec 2017), pancreatic cyst, cirrhosis w/ grade 1 gastric varices of unclear etiology, PUD, HTN pw AVF clot and missed HD

## 2020-01-25 NOTE — H&P ADULT - NSHPREVIEWOFSYSTEMS_GEN_ALL_CORE
REVIEW OF SYSTEMS:    CONSTITUTIONAL: No weakness, fevers or chills  EYES/ENT: No visual changes;  No vertigo or throat pain   NECK: No pain or stiffness  RESPIRATORY: No cough, wheezing, hemoptysis; No shortness of breath  CARDIOVASCULAR: No chest pain or palpitations  GASTROINTESTINAL: No abdominal or epigastric pain. No nausea, vomiting, or hematemesis; No diarrhea or constipation. No melena or hematochezia.  GENITOURINARY: No dysuria, frequency or hematuria  NEUROLOGICAL: No numbness or weakness  SKIN: No itching, burning, rashes, or lesions   All other review of systems is negative unless indicated above. REVIEW OF SYSTEMS:    CONSTITUTIONAL: No weakness, fevers or chills  EYES/ENT: No visual changes;  No vertigo or throat pain   NECK: No pain or stiffness  RESPIRATORY: No cough, wheezing, hemoptysis; No shortness of breath  CARDIOVASCULAR: No chest pain or palpitations  GASTROINTESTINAL: No abdominal or epigastric pain. No nausea, vomiting, or hematemesis; No diarrhea or constipation. No melena or hematochezia.  GENITOURINARY: No dysuria, frequency or hematuria  NEUROLOGICAL: No numbness or weakness  SKIN: No itching, burning, rashes, or lesions   Psych: no anxiety or depression  All other review of systems is negative unless indicated above.

## 2020-01-25 NOTE — PROGRESS NOTE ADULT - PROBLEM SELECTOR PLAN 1
AVF clotted in the setting of polycythemia vera   -Vascular surgery recs appreciated. F/u with IR for possible thrombectomy as she had thrombectomy in the past  - call IR in AM by primary team  - currently NPO for poss procedure. AVF clotted in the setting of polycythemia vera   -Vascular surgery recs appreciated. F/u with IR for possible thrombectomy as she had thrombectomy in the past  - call IR in AM by primary team  - currently NPO for poss procedure AVF clotted in the setting of polycythemia vera, has had multiple issues w/ IR thrombectomies (last one was 1 mon ago by IR according to family)  -f/u IR recs: no acute thrombectomy right now   -f/u vascular surgery recs: [facility] for urgent HD access  -will still need a thrombectomy with either IR or vascular surgery and still pending scheduling with either service AVF clotted in the setting of polycythemia vera, has had multiple issues w/ IR thrombectomies (last one was 1 mon ago by IR according to family)  -f/u IR recs: no acute thrombectomy right now   -f/u vascular surgery recs: [facility] for urgent HD access  -will still need a thrombectomy with either IR or vascular surgery, US pending

## 2020-01-25 NOTE — PROGRESS NOTE ADULT - PROBLEM SELECTOR PLAN 2
HD (MWF)  -Consult nephrology in the AM by primary team for poss dialysis  -F/u with her nephrologist Dr. De León   -Patient has stable electrolytes and no signs for urgent dialysis at this time HD (MWF) via L AVF placed 2 years ago and known to have thromboses multiple times  -f/u nephro recs on urgent HD today   -F/u with her nephrologist Dr. De León at [facility]

## 2020-01-25 NOTE — CONSULT NOTE ADULT - ATTENDING COMMENTS
Alert, conversant.  Described multiple declotting events, now with catheter  1.  Hyperkalemia--med rx, dialysis today  2.  ESRD--HD today.  Trend performance of new catheter  3.  HyperPO4--binder

## 2020-01-25 NOTE — CONSULT NOTE ADULT - SUBJECTIVE AND OBJECTIVE BOX
[facility] DIVISION OF KIDNEY DISEASES AND HYPERTENSION -- [phone]  -- INITIAL CONSULT NOTE  --------------------------------------------------------------------------------  HPI:        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  Splenomegaly: 2015  Hypertension  Peptic ulcer disease  Polycythemia vera  AV fistula: Placed 9/18  Hemoperitoneum as complication of peritoneal dialysis: s/p removal 9/18  Peritoneal dialysis catheter in place: Placed 8/9/2017    FAMILY HISTORY:  Family history of malignant neoplasm (Grandparent): head and neck in father  Family history of hypertension in mother    PAST SOCIAL HISTORY:    ALLERGIES & MEDICATIONS  --------------------------------------------------------------------------------  Allergies    No Known Allergies    Intolerances      Standing Inpatient Medications  calcium acetate 1334 milliGRAM(s) Oral three times a day with meals  calcium gluconate IVPB 1 Gram(s) IV Intermittent once  heparin  Injectable 5000 Unit(s) SubCutaneous every 12 hours    PRN Inpatient Medications  acetaminophen   Tablet .. 650 milliGRAM(s) Oral every 6 hours PRN      REVIEW OF SYSTEMS  --------------------------------------------------------------------------------  Gen: No fevers/chills  Skin: No rashes  Head/Eyes/Ears: Normal hearing,   Respiratory: No dyspnea, cough  CV: No chest pain  GI: No abdominal pain, diarrhea  : No dysuria, hematuria  MSK: No  edema  Heme: No easy bruising or bleeding  Psych: No significant depression    All other systems were reviewed and are negative, except as noted.    VITALS/PHYSICAL EXAM  --------------------------------------------------------------------------------  T(C): 36.6 (01-25-20 @ 07:45), Max: 36.8 (01-24-20 @ 19:14)  HR: 86 (01-25-20 @ 07:45) (62 - 86)  BP: 119/76 (01-25-20 @ 07:45) (117/74 - 135/81)  RR: 17 (01-25-20 @ 07:45) (17 - 18)  SpO2: 97% (01-25-20 @ 07:45) (97% - 99%)  Wt(kg): --  Height (cm): 152.9 (01-25-20 @ 07:45)  Weight (kg): 59 (01-25-20 @ 07:45)  BMI (kg/m2): 25.2 (01-25-20 @ 07:45)  BSA (m2): 1.56 (01-25-20 @ 07:45)      Physical Exam:  	Gen: NAD  	HEENT: MMM  	Pulm: CTA B/L  	CV: S1S2  	Abd: Soft, +BS   	Ext: No LE edema B/L  	Neuro: Awake  	Skin: Warm and dry  	Vascular access:    LABS/STUDIES  --------------------------------------------------------------------------------              12.2   5.77  >-----------<  200      [01-25-20 @ 11:53]              38.7     134  |  96  |  56  ----------------------------<  284      [01-25-20 @ 11:53]  5.3   |  21  |  9.82        Ca     8.9     [01-25-20 @ 11:53]      Mg     2.7     [01-25-20 @ 11:53]      Phos  3.7     [01-25-20 @ 11:53]    TPro  7.4  /  Alb  4.0  /  TBili  1.7  /  DBili  x   /  AST  12  /  ALT  10  /  AlkPhos  125  [01-25-20 @ 11:53]    PT/INR: PT 13.2 , INR 1.15       [01-25-20 @ 11:53]  PTT: 36.0       [01-25-20 @ 11:53]      Creatinine Trend:  SCr 9.82 [01-25 @ 11:53]  SCr 9.01 [01-25 @ 01:19]  SCr 9.13 [01-24 @ 23:26]    Urinalysis - [12-07-18 @ 12:41]      Color Yellow / Appearance Clear / SG 1.035 / pH 8.0      Gluc 100 mg/dL / Ketone Negative  / Bili Negative / Urobili Negative       Blood Negative / Protein 100 mg/dL / Leuk Est Negative / Nitrite Negative      RBC 2 / WBC 8 / Hyaline 3 / Gran  / Sq Epi  / Non Sq Epi 10 / Bacteria Many    HbA1c 4.9      [06-01-19 @ 00:46]  Lipid: chol 125, TG 96, HDL 43, LDL 63      [06-01-19 @ 00:46]    HBsAb <3.0      [03-13-18 @ 15:04]  HBsAb Nonreact      [01-19-17 @ 20:01]  HBsAg Nonreact      [05-05-18 @ 15:09]  HBcAb Nonreact      [03-13-18 @ 15:04]  HCV 0.21, Nonreact      [05-05-18 @ 15:09]  HIV Nonreact      [01-19-17 @ 20:01]    RAHUL: titer 1:80, pattern Homogeneous      [05-16-18 @ 22:43]  C3 Complement 100      [01-19-17 @ 20:01]  C4 Complement 32      [01-19-17 @ 20:01]  Rheumatoid Factor <7.0      [01-19-17 @ 20:01]  ANCA: cANCA Negative, pANCA Negative, atypical ANCA Negative      [01-19-17 @ 20:01]  anti-GBM <0.2      [01-20-17 @ 01:52]  ASLO 59      [01-19-17 @ 20:01]  Syphilis Screen (Treponema Pallidum Ab) Negative      [01-19-17 @ 20:01]  Free Light Chains: kappa 12.00, lambda 14.10, ratio = 0.85      [01-23 @ 14:39]  Immunofixation Serum:   No Monoclonal Band Identified      [01-23-17 @ 14:39]  SPEP Interpretation: Polyclonal Gammopathy      [01-23-17 @ 14:39] [facility] DIVISION OF KIDNEY DISEASES AND HYPERTENSION -- [phone]  -- INITIAL CONSULT NOTE  --------------------------------------------------------------------------------  HPI: 59 year old female, PMH of MAK 2 + polycythemia vera (diagnosed in 2012), ESRD on HD (MWF) via LUE AVF (2/2 chronic GN), cirrhosis w/ grade 1 gastric varices of unclear etiology, PUD, HTN presented to the ED with AVF clot. Nephrology consulted for ESRD management. Patient follows at [facility] HD center with Dr. De León. Patient states she has had multiple episodes of her AVF clotting since placement. Patient was receiving HD on Friday when her fistula clotted. Last full HD was on 1/22/2020. Patient noted to be hyperkalemic to 5.8, was treated medically, repeat is 5.3. Patient no longer makes urine. Patient had RIJ non tunneled HD catheter placed by Vascular surgery. Patient complaining of mild chest pain after catheter placement, CXR negative for pneumothorax. Patient denies any SOB, nausea, vomiting.    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  Splenomegaly: 2015  Hypertension  Peptic ulcer disease  Polycythemia vera  AV fistula: Placed 9/18  Hemoperitoneum as complication of peritoneal dialysis: s/p removal 9/18  Peritoneal dialysis catheter in place: Placed 8/9/2017    FAMILY HISTORY:  Family history of malignant neoplasm (Grandparent): head and neck in father  Family history of hypertension in mother    PAST SOCIAL HISTORY: lives at home with family, denies any EtOH or smoking    ALLERGIES & MEDICATIONS  --------------------------------------------------------------------------------  Allergies    No Known Allergies    Intolerances    Standing Inpatient Medications  calcium acetate 1334 milliGRAM(s) Oral three times a day with meals  calcium gluconate IVPB 1 Gram(s) IV Intermittent once  heparin  Injectable 5000 Unit(s) SubCutaneous every 12 hours    REVIEW OF SYSTEMS  --------------------------------------------------------------------------------  Gen: No fevers/chills  Skin: No rashes  Respiratory: No dyspnea, cough  CV: (+) mild chest pain  GI: No abdominal pain, diarrhea  : No dysuria, hematuria  MSK: trace edema  Psych: No significant depression    All other systems were reviewed and are negative, except as noted.    VITALS/PHYSICAL EXAM  --------------------------------------------------------------------------------  T(C): 36.6 (01-25-20 @ 07:45), Max: 36.8 (01-24-20 @ 19:14)  HR: 86 (01-25-20 @ 07:45) (62 - 86)  BP: 119/76 (01-25-20 @ 07:45) (117/74 - 135/81)  RR: 17 (01-25-20 @ 07:45) (17 - 18)  SpO2: 97% (01-25-20 @ 07:45) (97% - 99%)  Wt(kg): --  Height (cm): 152.9 (01-25-20 @ 07:45)  Weight (kg): 59 (01-25-20 @ 07:45)  BMI (kg/m2): 25.2 (01-25-20 @ 07:45)  BSA (m2): 1.56 (01-25-20 @ 07:45)      Physical Exam:  	Gen: NAD  	HEENT: MMM  	Pulm: CTA B/L  	CV: S1S2  	Abd: Soft, +BS   	Ext: trace LE edema B/L  	Neuro: Awake  	Skin: Warm and dry  	Vascular access: RIJ non tunneled HD catheter, no overlying bleeding. LUE AV fistula, no palpable thrill.    LABS/STUDIES  --------------------------------------------------------------------------------              12.2   5.77  >-----------<  200      [01-25-20 @ 11:53]              38.7     134  |  96  |  56  ----------------------------<  284      [01-25-20 @ 11:53]  5.3   |  21  |  9.82        Ca     8.9     [01-25-20 @ 11:53]      Mg     2.7     [01-25-20 @ 11:53]      Phos  3.7     [01-25-20 @ 11:53]    TPro  7.4  /  Alb  4.0  /  TBili  1.7  /  DBili  x   /  AST  12  /  ALT  10  /  AlkPhos  125  [01-25-20 @ 11:53]    PT/INR: PT 13.2 , INR 1.15       [01-25-20 @ 11:53]  PTT: 36.0       [01-25-20 @ 11:53]    Creatinine Trend:  SCr 9.82 [01-25 @ 11:53]  SCr 9.01 [01-25 @ 01:19]  SCr 9.13 [01-24 @ 23:26]

## 2020-01-25 NOTE — CONSULT NOTE ADULT - PROBLEM SELECTOR RECOMMENDATION 3
BP at target range. Monitor BP on current BP medication. Low salt diet.     If any questions, please feel free to contact me.  Thompson Staley   Nephrology Fellow  [phone]

## 2020-01-25 NOTE — PROGRESS NOTE ADULT - SUBJECTIVE AND OBJECTIVE BOX
Nile Guo  PGY3 Internal Medicine  Pager [phone] or [phone]    Patient is a 59y old  Female who presents with a chief complaint of AVF clot (25 Jan 2020 06:23)    SUBJECTIVE / OVERNIGHT EVENTS:  No overnight events, no f/n/v/d/CP/SOB    MEDICATIONS  (STANDING):  calcium acetate 1334 milliGRAM(s) Oral three times a day with meals  heparin  Injectable 5000 Unit(s) SubCutaneous every 12 hours    MEDICATIONS  (PRN):  acetaminophen   Tablet .. 650 milliGRAM(s) Oral every 6 hours PRN Mild Pain (1 - 3)    I&O's Summary  PHYSICAL EXAM:    T(C): 36.6 (01-25-20 @ 07:45), Max: 36.8 (01-24-20 @ 19:14)  HR: 86 (01-25-20 @ 07:45) (62 - 86)  BP: 119/76 (01-25-20 @ 07:45) (117/74 - 135/81)  RR: 17 (01-25-20 @ 07:45) (17 - 18)  SpO2: 97% (01-25-20 @ 07:45) (97% - 99%)    	GENERAL: NAD, well-developed  	HEAD:  Atraumatic, Normocephalic  	EYES: EOMI, PERRLA, conjunctiva and sclera clear  	NECK: Supple, No JVD  	CHEST/LUNG: Clear to auscultation bilaterally; No wheeze  	HEART: Regular rate and rhythm; No murmurs, rubs, or gallops  	ABDOMEN: Soft, Nontender, Nondistended; Bowel sounds present  	EXTREMITIES:  2+ Peripheral Pulses, No clubbing, cyanosis, or edema  	PSYCH: AAOx3. Normal behavior and affect.  	NEUROLOGY: non-focal. moving all extrem. normal sensation and strength.  SKIN: No rashes or lesions    LABS:                        12.4   7.54  )-----------( 262      ( 24 Jan 2020 23:26 )             38.9     01-25    136  |  101  |  53<H>  ----------------------------<  102<H>  5.8<H>   |  20<L>  |  9.01<H>    Ca    9.3      25 Jan 2020 01:19  Phos  3.4     01-24  Mg     2.8     01-24    TPro  8.1  /  Alb  4.5  /  TBili  1.5<H>  /  DBili  x   /  AST  21  /  ALT  13  /  AlkPhos  136<H>  01-24    PT/INR - ( 24 Jan 2020 23:26 )   PT: 12.7 sec;   INR: 1.10 ratio         PTT - ( 24 Jan 2020 23:26 )  PTT:36.1 sec          Micro:      RADIOLOGY & ADDITIONAL TESTS:    Imaging Personally Reviewed:    Consultant(s) Notes Reviewed:      Care Discussed with Consultants/Other Providers: Nile Guo  PGY3 Internal Medicine  Pager [phone] or [phone]    Patient is a 59y old  Female who presents with a chief complaint of AVF clot (25 Jan 2020 06:23)    SUBJECTIVE / OVERNIGHT EVENTS:  No overnight events, no f/n/v/d/CP/SOB but needed urgent HD and shiley placed. Pt after felt anxious and back pain.     MEDICATIONS  (STANDING):  calcium acetate 1334 milliGRAM(s) Oral three times a day with meals  heparin  Injectable 5000 Unit(s) SubCutaneous every 12 hours    MEDICATIONS  (PRN):  acetaminophen   Tablet .. 650 milliGRAM(s) Oral every 6 hours PRN Mild Pain (1 - 3)    I&O's Summary  PHYSICAL EXAM:    T(C): 36.6 (01-25-20 @ 07:45), Max: 36.8 (01-24-20 @ 19:14)  HR: 86 (01-25-20 @ 07:45) (62 - 86)  BP: 119/76 (01-25-20 @ 07:45) (117/74 - 135/81)  RR: 17 (01-25-20 @ 07:45) (17 - 18)  SpO2: 97% (01-25-20 @ 07:45) (97% - 99%)    	GENERAL: NAD, well-developed  	HEAD:  Atraumatic, Normocephalic  	EYES: EOMI, PERRLA, conjunctiva and sclera clear  	NECK: Supple, No JVD and Shiley in the RIJ  	CHEST/LUNG: Clear to auscultation bilaterally; No wheeze  	HEART: Regular rate and rhythm; No murmurs, rubs, or gallops  	ABDOMEN: Soft, Nontender, Nondistended; Bowel sounds present  	EXTREMITIES:  2+ Peripheral Pulses, No clubbing, cyanosis, or edema, LAVF with no bruit felt or auscultated   	PSYCH: AAOx3. Normal behavior and affect.  	NEUROLOGY: non-focal. moving all extrem. normal sensation and strength.  SKIN: No rashes or lesions    LABS:                        12.4   7.54  )-----------( 262      ( 24 Jan 2020 23:26 )             38.9     01-25    136  |  101  |  53<H>  ----------------------------<  102<H>  5.8<H>   |  20<L>  |  9.01<H>    Ca    9.3      25 Jan 2020 01:19  Phos  3.4     01-24  Mg     2.8     01-24    TPro  8.1  /  Alb  4.5  /  TBili  1.5<H>  /  DBili  x   /  AST  21  /  ALT  13  /  AlkPhos  136<H>  01-24    PT/INR - ( 24 Jan 2020 23:26 )   PT: 12.7 sec;   INR: 1.10 ratio         PTT - ( 24 Jan 2020 23:26 )  PTT:36.1 sec          Micro:      RADIOLOGY & ADDITIONAL TESTS:    Imaging Personally Reviewed:    Consultant(s) Notes Reviewed:      Care Discussed with Consultants/Other Providers: Nile Guo  PGY3 Internal Medicine  Pager [phone] or [phone]    Patient is a 59y old  Female who presents with a chief complaint of AVF clot (25 Jan 2020 06:23)    SUBJECTIVE / OVERNIGHT EVENTS:  No overnight events, no f/n/v/d/CP/SOB but needed urgent HD and shiley placed. Pt after felt anxious and back pain but denies chest pain, sob, palpitations,     MEDICATIONS  (STANDING):  calcium acetate 1334 milliGRAM(s) Oral three times a day with meals  heparin  Injectable 5000 Unit(s) SubCutaneous every 12 hours    MEDICATIONS  (PRN):  acetaminophen   Tablet .. 650 milliGRAM(s) Oral every 6 hours PRN Mild Pain (1 - 3)    I&O's Summary  PHYSICAL EXAM:    T(C): 36.6 (01-25-20 @ 07:45), Max: 36.8 (01-24-20 @ 19:14)  HR: 86 (01-25-20 @ 07:45) (62 - 86)  BP: 119/76 (01-25-20 @ 07:45) (117/74 - 135/81)  RR: 17 (01-25-20 @ 07:45) (17 - 18)  SpO2: 97% (01-25-20 @ 07:45) (97% - 99%)    	GENERAL: NAD, well-developed  	HEAD:  Atraumatic, Normocephalic  	EYES: EOMI, PERRLA, conjunctiva and sclera clear  	NECK: Supple, No JVD and Shiley in the RIJ  	CHEST/LUNG: Clear to auscultation bilaterally; No wheeze  	HEART: Regular rate and rhythm; No murmurs, rubs, or gallops  	ABDOMEN: Soft, Nontender, Nondistended; Bowel sounds present  	EXTREMITIES:  2+ Peripheral Pulses, No clubbing, cyanosis, or edema, LAVF with no bruit felt or auscultated   	PSYCH: AAOx3. Normal behavior and affect.  	NEUROLOGY: non-focal. moving all extrem. normal sensation and strength.  SKIN: No rashes or lesions    LABS:                        12.4   7.54  )-----------( 262      ( 24 Jan 2020 23:26 )             38.9     01-25    136  |  101  |  53<H>  ----------------------------<  102<H>  5.8<H>   |  20<L>  |  9.01<H>    Ca    9.3      25 Jan 2020 01:19  Phos  3.4     01-24  Mg     2.8     01-24    TPro  8.1  /  Alb  4.5  /  TBili  1.5<H>  /  DBili  x   /  AST  21  /  ALT  13  /  AlkPhos  136<H>  01-24    PT/INR - ( 24 Jan 2020 23:26 )   PT: 12.7 sec;   INR: 1.10 ratio         PTT - ( 24 Jan 2020 23:26 )  PTT:36.1 sec          Micro:      RADIOLOGY & ADDITIONAL TESTS:    Imaging Personally Reviewed:    Consultant(s) Notes Reviewed:      Care Discussed with Consultants/Other Providers:

## 2020-01-25 NOTE — H&P ADULT - NSHPLABSRESULTS_GEN_ALL_CORE
12.4   7.54  )-----------( 262      ( 24 Jan 2020 23:26 )             38.9     Hgb Trend: 12.4<--  01-25    136  |  101  |  53<H>  ----------------------------<  102<H>  5.8<H>   |  20<L>  |  9.01<H>    Ca    9.3      25 Jan 2020 01:19  Phos  3.4     01-24  Mg     2.8     01-24    TPro  8.1  /  Alb  4.5  /  TBili  1.5<H>  /  DBili  x   /  AST  21  /  ALT  13  /  AlkPhos  136<H>  01-24    Creatinine Trend: 9.01<--, 9.13<--  PT/INR - ( 24 Jan 2020 23:26 )   PT: 12.7 sec;   INR: 1.10 ratio         PTT - ( 24 Jan 2020 23:26 )  PTT:36.1 sec Personally reviewed imaging, labs.    12.4   7.54  )-----------( 262      ( 24 Jan 2020 23:26 )             38.9     Hgb Trend: 12.4<--  01-25    136  |  101  |  53<H>  ----------------------------<  102<H>  5.8<H>   |  20<L>  |  9.01<H>    Ca    9.3      25 Jan 2020 01:19  Phos  3.4     01-24  Mg     2.8     01-24    TPro  8.1  /  Alb  4.5  /  TBili  1.5<H>  /  DBili  x   /  AST  21  /  ALT  13  /  AlkPhos  136<H>  01-24    Creatinine Trend: 9.01<--, 9.13<--  PT/INR - ( 24 Jan 2020 23:26 )   PT: 12.7 sec;   INR: 1.10 ratio         PTT - ( 24 Jan 2020 23:26 )  PTT:36.1 sec

## 2020-01-25 NOTE — H&P ADULT - PROBLEM SELECTOR PLAN 3
on treatment   -C/w hydroxyurea 500 mg TID with HD '  -F/u with Dr. Guo on treatment   -C/w hydroxyurea 500 mg TID with HD   -F/u with Dr. Guo

## 2020-01-25 NOTE — H&P ADULT - PROBLEM SELECTOR PLAN 2
HD (MWF)  -F/u with her nephrologist Dr. Bacon   -Patient has stable electrolytes and no signs for urgent dialysis   -Consult nephrology in the AM for possible dialysis HD (MWF)  -Consult nephrology in the AM by primary team for poss dialysis  -F/u with her nephrologist Dr. De León   -Patient has stable electrolytes and no signs for urgent dialysis at this time

## 2020-01-25 NOTE — CONSULT NOTE ADULT - PROBLEM SELECTOR RECOMMENDATION 2
Pt. with hyperphosphatemia in setting of ESRD. Continue Phoslo 1334 mg TID with meals. Check intact PTH level. Low phosphorus diet advised. Monitor serum phosphorus

## 2020-01-25 NOTE — PROGRESS NOTE ADULT - ASSESSMENT
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 splenomegaly and splenic vein thrombosis (2015), ESRD on HD (MWF) via LUE AVF (2/2 chronic GN, started Dec 2017), pancreatic cyst, cirrhosis w/ grade 1 gastric varices of unclear etiology, PUD, HTN pw AVF clot and missed HD 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 splenomegaly and splenic vein thrombosis (2015), ESRD on HD (MWF) via LUE AVF (2/2 chronic GN, started Dec 2017), pancreatic cyst, cirrhosis w/ grade 1 gastric varices of unclear etiology, PUD, HTN pw AVF clot and missed HD, now w/ KYLE hollis getting urgent HD

## 2020-01-26 LAB
ALBUMIN SERPL ELPH-MCNC: 4 G/DL — SIGNIFICANT CHANGE UP (ref 3.3–5)
ALP SERPL-CCNC: 125 U/L — HIGH (ref 40–120)
ALT FLD-CCNC: 7 U/L — LOW (ref 10–45)
ANION GAP SERPL CALC-SCNC: 16 MMOL/L — SIGNIFICANT CHANGE UP (ref 5–17)
AST SERPL-CCNC: 13 U/L — SIGNIFICANT CHANGE UP (ref 10–40)
BILIRUB SERPL-MCNC: 1.9 MG/DL — HIGH (ref 0.2–1.2)
BUN SERPL-MCNC: 40 MG/DL — HIGH (ref 7–23)
CALCIUM SERPL-MCNC: 8.6 MG/DL — SIGNIFICANT CHANGE UP (ref 8.4–10.5)
CALCIUM SERPL-MCNC: 8.6 MG/DL — SIGNIFICANT CHANGE UP (ref 8.4–10.5)
CHLORIDE SERPL-SCNC: 94 MMOL/L — LOW (ref 96–108)
CO2 SERPL-SCNC: 23 MMOL/L — SIGNIFICANT CHANGE UP (ref 22–31)
CREAT SERPL-MCNC: 7.82 MG/DL — HIGH (ref 0.5–1.3)
GLUCOSE SERPL-MCNC: 107 MG/DL — HIGH (ref 70–99)
HCT VFR BLD CALC: 35.6 % — SIGNIFICANT CHANGE UP (ref 34.5–45)
HGB BLD-MCNC: 11.7 G/DL — SIGNIFICANT CHANGE UP (ref 11.5–15.5)
MAGNESIUM SERPL-MCNC: 2.4 MG/DL — SIGNIFICANT CHANGE UP (ref 1.6–2.6)
MCHC RBC-ENTMCNC: 32.9 GM/DL — SIGNIFICANT CHANGE UP (ref 32–36)
MCHC RBC-ENTMCNC: 33.6 PG — SIGNIFICANT CHANGE UP (ref 27–34)
MCV RBC AUTO: 102.3 FL — HIGH (ref 80–100)
NRBC # BLD: 0 /100 WBCS — SIGNIFICANT CHANGE UP (ref 0–0)
PHOSPHATE SERPL-MCNC: 4.3 MG/DL — SIGNIFICANT CHANGE UP (ref 2.5–4.5)
PLATELET # BLD AUTO: 181 K/UL — SIGNIFICANT CHANGE UP (ref 150–400)
POTASSIUM SERPL-MCNC: 4.6 MMOL/L — SIGNIFICANT CHANGE UP (ref 3.5–5.3)
POTASSIUM SERPL-SCNC: 4.6 MMOL/L — SIGNIFICANT CHANGE UP (ref 3.5–5.3)
PROT SERPL-MCNC: 7.4 G/DL — SIGNIFICANT CHANGE UP (ref 6–8.3)
PTH-INTACT FLD-MCNC: 175 PG/ML — HIGH (ref 15–65)
RBC # BLD: 3.48 M/UL — LOW (ref 3.8–5.2)
RBC # FLD: 15.5 % — HIGH (ref 10.3–14.5)
SODIUM SERPL-SCNC: 133 MMOL/L — LOW (ref 135–145)
WBC # BLD: 6.85 K/UL — SIGNIFICANT CHANGE UP (ref 3.8–10.5)
WBC # FLD AUTO: 6.85 K/UL — SIGNIFICANT CHANGE UP (ref 3.8–10.5)

## 2020-01-26 PROCEDURE — 99233 SBSQ HOSP IP/OBS HIGH 50: CPT | Mod: GC

## 2020-01-26 PROCEDURE — 93990 DOPPLER FLOW TESTING: CPT | Mod: 26

## 2020-01-26 RX ORDER — HYDROMORPHONE HYDROCHLORIDE 2 MG/ML
0.2 INJECTION INTRAMUSCULAR; INTRAVENOUS; SUBCUTANEOUS ONCE
Refills: 0 | Status: DISCONTINUED | OUTPATIENT
Start: 2020-01-26 | End: 2020-01-26

## 2020-01-26 RX ORDER — OXYCODONE AND ACETAMINOPHEN 5; 325 MG/1; MG/1
1 TABLET ORAL EVERY 6 HOURS
Refills: 0 | Status: DISCONTINUED | OUTPATIENT
Start: 2020-01-26 | End: 2020-01-28

## 2020-01-26 RX ADMIN — HYDROMORPHONE HYDROCHLORIDE 0.2 MILLIGRAM(S): 2 INJECTION INTRAMUSCULAR; INTRAVENOUS; SUBCUTANEOUS at 08:53

## 2020-01-26 RX ADMIN — Medication 1334 MILLIGRAM(S): at 17:08

## 2020-01-26 RX ADMIN — OXYCODONE AND ACETAMINOPHEN 1 TABLET(S): 5; 325 TABLET ORAL at 19:51

## 2020-01-26 RX ADMIN — HYDROMORPHONE HYDROCHLORIDE 0.2 MILLIGRAM(S): 2 INJECTION INTRAMUSCULAR; INTRAVENOUS; SUBCUTANEOUS at 08:38

## 2020-01-26 RX ADMIN — OXYCODONE AND ACETAMINOPHEN 1 TABLET(S): 5; 325 TABLET ORAL at 18:54

## 2020-01-26 RX ADMIN — HEPARIN SODIUM 5000 UNIT(S): 5000 INJECTION INTRAVENOUS; SUBCUTANEOUS at 05:53

## 2020-01-26 RX ADMIN — Medication 1334 MILLIGRAM(S): at 11:31

## 2020-01-26 RX ADMIN — Medication 1334 MILLIGRAM(S): at 08:38

## 2020-01-26 RX ADMIN — HEPARIN SODIUM 5000 UNIT(S): 5000 INJECTION INTRAVENOUS; SUBCUTANEOUS at 17:08

## 2020-01-26 NOTE — PROGRESS NOTE ADULT - PROBLEM SELECTOR PLAN 2
HD (MWF) via L AVF placed 2 years ago and known to have thromboses multiple times  -f/u nephro recs on urgent HD:  --Darby placed, urgent HD session 1/25   -F/u with her nephrologist Dr. De León at [facility] HD (MWF) via L AVF placed 2 years ago and known to have thromboses multiple times  -f/u nephro recs on urgent HD:  --Shiley placed, urgent HD session 1/25  --elevated PTH on 1/26: 175, will f/u nephro recs   -F/u with her nephrologist Dr. De León at [facility]

## 2020-01-26 NOTE — PROGRESS NOTE ADULT - PROBLEM SELECTOR PLAN 1
AVF clotted in the setting of polycythemia vera, has had multiple issues w/ IR thrombectomies (last one was 1 mon ago by IR according to family)  -f/u IR recs: no acute thrombectomy right now   -f/u vascular surgery recs: place shiley for urgent HD access  -will still need a thrombectomy with either IR or vascular surgery, US pending AVF clotted in the setting of polycythemia vera, has had multiple issues w/ IR thrombectomies (last one was 1 mon ago by IR according to family)  -f/u IR recs: no acute thrombectomy right now   -f/u vascular surgery recs: s/p telma for urgent HD access  -will still need a thrombectomy with either IR or vascular surgery, US pending

## 2020-01-26 NOTE — PROGRESS NOTE ADULT - SUBJECTIVE AND OBJECTIVE BOX
Patient is a 59y old  Female who presents with a chief complaint of AVF clot (25 Jan 2020 12:49)      SUBJECTIVE / OVERNIGHT EVENTS: No acute events overnight. Pt reports pain at       MEDICATIONS  (STANDING):  calcium acetate 1334 milliGRAM(s) Oral three times a day with meals  heparin  Injectable 5000 Unit(s) SubCutaneous every 12 hours  hydroxyurea 500 milliGRAM(s) Oral <User Schedule>    MEDICATIONS  (PRN):  acetaminophen   Tablet .. 650 milliGRAM(s) Oral every 6 hours PRN Mild Pain (1 - 3), Moderate Pain (4 - 6)      Vital Signs Last 24 Hrs  T(C): 36.7 (26 Jan 2020 04:23), Max: 37 (25 Jan 2020 20:30)  T(F): 98 (26 Jan 2020 04:23), Max: 98.6 (25 Jan 2020 20:30)  HR: 83 (26 Jan 2020 04:23) (83 - 110)  BP: 119/75 (26 Jan 2020 04:23) (97/61 - 119/75)  BP(mean): --  RR: 18 (26 Jan 2020 04:23) (18 - 18)  SpO2: 97% (26 Jan 2020 04:23) (94% - 97%)  CAPILLARY BLOOD GLUCOSE      POCT Blood Glucose.: 202 mg/dL (25 Jan 2020 12:12)    I&O's Summary    25 Jan 2020 07:01  -  26 Jan 2020 07:00  --------------------------------------------------------  IN: 800 mL / OUT: 1300 mL / NET: -500 mL        PHYSICAL EXAM:  Vital Signs Last 24 Hrs  T(C): 36.7 (01-26-20 @ 04:23)  T(F): 98 (01-26-20 @ 04:23), Max: 98.6 (01-25-20 @ 20:30)  HR: 83 (01-26-20 @ 04:23) (83 - 110)  BP: 119/75 (01-26-20 @ 04:23)  BP(mean): --  RR: 18 (01-26-20 @ 04:23) (18 - 18)  SpO2: 97% (01-26-20 @ 04:23) (94% - 97%)  Wt(kg): --    01-25 @ 07:01  -  01-26 @ 07:00  --------------------------------------------------------  IN: 800 mL / OUT: 1300 mL / NET: -500 mL      Constitutional: NAD, awake and alert  EYES: EOMI  ENT:  Normal Hearing, no tonsillar exudates   Neck: Soft and supple , no thyromegaly   Respiratory: Breath sounds are clear bilaterally, No wheezing, rales or rhonchi  Cardiovascular: S1 and S2, regular rate and rhythm, no Murmurs, gallops or rubs, no JVD,    Gastrointestinal: Bowel Sounds present, soft, nontender, nondistended, no guarding, no rebound  Extremities: No cyanosis or clubbing; warm to touch  Vascular: 2+ peripheral pulses lower ex  Neurological: No focal deficits, CN II-XII intact bilaterally, sensation to light touch intact in all extremities, gait intact. Pupils are equally reactive to light and symmetrical in size.   Musculoskeletal: 5/5 strength b/l upper and lower extremities; no joint swelling.  Skin: No rashes  Psych: no depression or anhedonia, AAOx3  HEME: no bruises, no nose bleeds      LABS:                        12.2   5.77  )-----------( 200      ( 25 Jan 2020 11:53 )             38.7     01-26    133<L>  |  94<L>  |  40<H>  ----------------------------<  107<H>  4.6   |  23  |  7.82<H>    Ca    8.6      26 Jan 2020 07:27  Phos  4.3     01-26  Mg     2.4     01-26    TPro  7.4  /  Alb  4.0  /  TBili  1.9<H>  /  DBili  x   /  AST  13  /  ALT  7<L>  /  AlkPhos  125<H>  01-26    PT/INR - ( 25 Jan 2020 11:53 )   PT: 13.2 sec;   INR: 1.15 ratio         PTT - ( 25 Jan 2020 11:53 )  PTT:36.0 sec          RADIOLOGY & ADDITIONAL TESTS:    Imaging Personally Reviewed:    Consultant(s) Notes Reviewed:      Care Discussed with Consultants/Other Providers: Patient is a 59y old  Female who presents with a chief complaint of AVF clot (25 Jan 2020 12:49)      SUBJECTIVE / OVERNIGHT EVENTS: No acute events overnight. Pt reports pain at       MEDICATIONS  (STANDING):  calcium acetate 1334 milliGRAM(s) Oral three times a day with meals  heparin  Injectable 5000 Unit(s) SubCutaneous every 12 hours  hydroxyurea 500 milliGRAM(s) Oral <User Schedule>    MEDICATIONS  (PRN):  acetaminophen   Tablet .. 650 milliGRAM(s) Oral every 6 hours PRN Mild Pain (1 - 3), Moderate Pain (4 - 6)      Vital Signs Last 24 Hrs  T(C): 36.7 (26 Jan 2020 04:23), Max: 37 (25 Jan 2020 20:30)  T(F): 98 (26 Jan 2020 04:23), Max: 98.6 (25 Jan 2020 20:30)  HR: 83 (26 Jan 2020 04:23) (83 - 110)  BP: 119/75 (26 Jan 2020 04:23) (97/61 - 119/75)  BP(mean): --  RR: 18 (26 Jan 2020 04:23) (18 - 18)  SpO2: 97% (26 Jan 2020 04:23) (94% - 97%)  CAPILLARY BLOOD GLUCOSE      POCT Blood Glucose.: 202 mg/dL (25 Jan 2020 12:12)    I&O's Summary    25 Jan 2020 07:01  -  26 Jan 2020 07:00  --------------------------------------------------------  IN: 800 mL / OUT: 1300 mL / NET: -500 mL        PHYSICAL EXAM:  Vital Signs Last 24 Hrs  T(C): 36.7 (01-26-20 @ 04:23)  T(F): 98 (01-26-20 @ 04:23), Max: 98.6 (01-25-20 @ 20:30)  HR: 83 (01-26-20 @ 04:23) (83 - 110)  BP: 119/75 (01-26-20 @ 04:23)  BP(mean): --  RR: 18 (01-26-20 @ 04:23) (18 - 18)  SpO2: 97% (01-26-20 @ 04:23) (94% - 97%)  Wt(kg): --    01-25 @ 07:01  -  01-26 @ 07:00  --------------------------------------------------------  IN: 800 mL / OUT: 1300 mL / NET: -500 mL      GENERAL: NAD, well-developed  HEAD:  Atraumatic, Normocephalic  EYES: conjunctiva and sclera clear  NECK: Supple, No JVD and Shiley in the RIJ  CHEST/LUNG: Clear to auscultation bilaterally; No wheeze  HEART: Regular rate and rhythm; No murmurs, rubs, or gallops  ABDOMEN: Soft, Nontender, Nondistended; Bowel sounds present  EXTREMITIES: No clubbing, cyanosis, or edema, LAVF with no bruit felt or auscultated   PSYCH: AAOx3. Normal behavior and affect.  NEUROLOGY: non-focal. moving all extrem. normal sensation and strength.  SKIN: No rashes or lesions    LABS:                        12.2   5.77  )-----------( 200      ( 25 Jan 2020 11:53 )             38.7     01-26    133<L>  |  94<L>  |  40<H>  ----------------------------<  107<H>  4.6   |  23  |  7.82<H>    Ca    8.6      26 Jan 2020 07:27  Phos  4.3     01-26  Mg     2.4     01-26    TPro  7.4  /  Alb  4.0  /  TBili  1.9<H>  /  DBili  x   /  AST  13  /  ALT  7<L>  /  AlkPhos  125<H>  01-26    PT/INR - ( 25 Jan 2020 11:53 )   PT: 13.2 sec;   INR: 1.15 ratio         PTT - ( 25 Jan 2020 11:53 )  PTT:36.0 sec          RADIOLOGY & ADDITIONAL TESTS:    Imaging Personally Reviewed:    Consultant(s) Notes Reviewed:      Care Discussed with Consultants/Other Providers: Patient is a 59y old  Female who presents with a chief complaint of AVF clot (25 Jan 2020 12:49)      SUBJECTIVE / OVERNIGHT EVENTS: No acute events overnight. Pt reports pain at site of RIJ, radiating to head, that was relieved with IV dilaudid. +nausea, no vomiting, headache resolved, no SOB, CP, abdominal pain      MEDICATIONS  (STANDING):  calcium acetate 1334 milliGRAM(s) Oral three times a day with meals  heparin  Injectable 5000 Unit(s) SubCutaneous every 12 hours  hydroxyurea 500 milliGRAM(s) Oral <User Schedule>    MEDICATIONS  (PRN):  acetaminophen   Tablet .. 650 milliGRAM(s) Oral every 6 hours PRN Mild Pain (1 - 3), Moderate Pain (4 - 6)      Vital Signs Last 24 Hrs  T(C): 36.7 (26 Jan 2020 04:23), Max: 37 (25 Jan 2020 20:30)  T(F): 98 (26 Jan 2020 04:23), Max: 98.6 (25 Jan 2020 20:30)  HR: 83 (26 Jan 2020 04:23) (83 - 110)  BP: 119/75 (26 Jan 2020 04:23) (97/61 - 119/75)  BP(mean): --  RR: 18 (26 Jan 2020 04:23) (18 - 18)  SpO2: 97% (26 Jan 2020 04:23) (94% - 97%)  CAPILLARY BLOOD GLUCOSE      POCT Blood Glucose.: 202 mg/dL (25 Jan 2020 12:12)    I&O's Summary    25 Jan 2020 07:01  -  26 Jan 2020 07:00  --------------------------------------------------------  IN: 800 mL / OUT: 1300 mL / NET: -500 mL        PHYSICAL EXAM:  Vital Signs Last 24 Hrs  T(C): 36.7 (01-26-20 @ 04:23)  T(F): 98 (01-26-20 @ 04:23), Max: 98.6 (01-25-20 @ 20:30)  HR: 83 (01-26-20 @ 04:23) (83 - 110)  BP: 119/75 (01-26-20 @ 04:23)  BP(mean): --  RR: 18 (01-26-20 @ 04:23) (18 - 18)  SpO2: 97% (01-26-20 @ 04:23) (94% - 97%)  Wt(kg): --    01-25 @ 07:01  -  01-26 @ 07:00  --------------------------------------------------------  IN: 800 mL / OUT: 1300 mL / NET: -500 mL      GENERAL: NAD, well-developed  HEAD:  Atraumatic, Normocephalic  EYES: conjunctiva and sclera clear  NECK: Supple, No JVD and Shiley in the RIJ, dressing clean and dry  CHEST/LUNG: Clear to auscultation bilaterally; No wheeze  HEART: Regular rate and rhythm; No murmurs, rubs, or gallops  ABDOMEN: Soft, Nontender, Nondistended; Bowel sounds present  EXTREMITIES: No clubbing, cyanosis, or edema, LUE AVF with no bruit felt or auscultated   PSYCH: AAOx3. Normal behavior and affect.  NEUROLOGY: non-focal. moving all extrem. normalstrength.  SKIN: No rashes or lesions    LABS:                        12.2   5.77  )-----------( 200      ( 25 Jan 2020 11:53 )             38.7     01-26    133<L>  |  94<L>  |  40<H>  ----------------------------<  107<H>  4.6   |  23  |  7.82<H>    Ca    8.6      26 Jan 2020 07:27  Phos  4.3     01-26  Mg     2.4     01-26    TPro  7.4  /  Alb  4.0  /  TBili  1.9<H>  /  DBili  x   /  AST  13  /  ALT  7<L>  /  AlkPhos  125<H>  01-26    PT/INR - ( 25 Jan 2020 11:53 )   PT: 13.2 sec;   INR: 1.15 ratio         PTT - ( 25 Jan 2020 11:53 )  PTT:36.0 sec          RADIOLOGY & ADDITIONAL TESTS:    Imaging Personally Reviewed:    Consultant(s) Notes Reviewed:      Care Discussed with Consultants/Other Providers: Patient is a 59y old  Female who presents with a chief complaint of AVF clot (25 Jan 2020 12:49)      SUBJECTIVE / OVERNIGHT EVENTS: No acute events overnight. some discomfort at [facility] site with tape pulling on skin, no bleeding, swelling. Denies chest pain, sob, headache, n/v, f/c.       MEDICATIONS  (STANDING):  calcium acetate 1334 milliGRAM(s) Oral three times a day with meals  heparin  Injectable 5000 Unit(s) SubCutaneous every 12 hours  hydroxyurea 500 milliGRAM(s) Oral <User Schedule>    MEDICATIONS  (PRN):  acetaminophen   Tablet .. 650 milliGRAM(s) Oral every 6 hours PRN Mild Pain (1 - 3), Moderate Pain (4 - 6)      Vital Signs Last 24 Hrs  T(C): 36.7 (26 Jan 2020 04:23), Max: 37 (25 Jan 2020 20:30)  T(F): 98 (26 Jan 2020 04:23), Max: 98.6 (25 Jan 2020 20:30)  HR: 83 (26 Jan 2020 04:23) (83 - 110)  BP: 119/75 (26 Jan 2020 04:23) (97/61 - 119/75)  BP(mean): --  RR: 18 (26 Jan 2020 04:23) (18 - 18)  SpO2: 97% (26 Jan 2020 04:23) (94% - 97%)  CAPILLARY BLOOD GLUCOSE      POCT Blood Glucose.: 202 mg/dL (25 Jan 2020 12:12)    I&O's Summary    25 Jan 2020 07:01  -  26 Jan 2020 07:00  --------------------------------------------------------  IN: 800 mL / OUT: 1300 mL / NET: -500 mL        PHYSICAL EXAM:  Vital Signs Last 24 Hrs  T(C): 36.7 (01-26-20 @ 04:23)  T(F): 98 (01-26-20 @ 04:23), Max: 98.6 (01-25-20 @ 20:30)  HR: 83 (01-26-20 @ 04:23) (83 - 110)  BP: 119/75 (01-26-20 @ 04:23)  BP(mean): --  RR: 18 (01-26-20 @ 04:23) (18 - 18)  SpO2: 97% (01-26-20 @ 04:23) (94% - 97%)  Wt(kg): --    01-25 @ 07:01  -  01-26 @ 07:00  --------------------------------------------------------  IN: 800 mL / OUT: 1300 mL / NET: -500 mL      GENERAL: NAD, well-developed  HEAD:  Atraumatic, Normocephalic  NECK: Supple, No JVD and Shiley in the RIJ, dressing clean and dry, no bleeding or swelling  CHEST/LUNG: Clear to auscultation bilaterally; No wheeze  HEART: Regular rate and rhythm; No murmurs, rubs, or gallops  ABDOMEN: Soft, Nontender, Nondistended; Bowel sounds present  EXTREMITIES: No clubbing, cyanosis, or edema, LUE AVF with no bruit felt or auscultated   PSYCH: AAOx3. Normal behavior and affect.  NEUROLOGY: non-focal. moving all extrem. normalstrength.  SKIN: No rashes or lesions    LABS:                        12.2   5.77  )-----------( 200      ( 25 Jan 2020 11:53 )             38.7     01-26    133<L>  |  94<L>  |  40<H>  ----------------------------<  107<H>  4.6   |  23  |  7.82<H>    Ca    8.6      26 Jan 2020 07:27  Phos  4.3     01-26  Mg     2.4     01-26    TPro  7.4  /  Alb  4.0  /  TBili  1.9<H>  /  DBili  x   /  AST  13  /  ALT  7<L>  /  AlkPhos  125<H>  01-26    PT/INR - ( 25 Jan 2020 11:53 )   PT: 13.2 sec;   INR: 1.15 ratio         PTT - ( 25 Jan 2020 11:53 )  PTT:36.0 sec          RADIOLOGY & ADDITIONAL TESTS:    Imaging Personally Reviewed:    Consultant(s) Notes Reviewed:      Care Discussed with Consultants/Other Providers:

## 2020-01-26 NOTE — PROGRESS NOTE ADULT - ASSESSMENT
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

## 2020-01-26 NOTE — PROGRESS NOTE ADULT - PROBLEM SELECTOR PLAN 4
DVT -heparin subQ    Transitions of Care Status:  1.  Name of PCP: Dr. Los Lemus    2.  PCP Contacted on Admission: [ ] Y    [x ] N    3.  PCP contacted at Discharge: [ ] Y    [ ] N    [ ] N/A  4.  Post-Discharge Appointment Date and Location:  5.  Summary of Handoff given to PCP:

## 2020-01-26 NOTE — PROGRESS NOTE ADULT - PROBLEM SELECTOR PLAN 3
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 Guo

## 2020-01-27 LAB
ALBUMIN SERPL ELPH-MCNC: 3.7 G/DL — SIGNIFICANT CHANGE UP (ref 3.3–5)
ALP SERPL-CCNC: 111 U/L — SIGNIFICANT CHANGE UP (ref 40–120)
ALT FLD-CCNC: 6 U/L — LOW (ref 10–45)
ANION GAP SERPL CALC-SCNC: 20 MMOL/L — HIGH (ref 5–17)
AST SERPL-CCNC: 10 U/L — SIGNIFICANT CHANGE UP (ref 10–40)
BASOPHILS # BLD AUTO: 0.03 K/UL — SIGNIFICANT CHANGE UP (ref 0–0.2)
BASOPHILS NFR BLD AUTO: 0.5 % — SIGNIFICANT CHANGE UP (ref 0–2)
BILIRUB SERPL-MCNC: 1.3 MG/DL — HIGH (ref 0.2–1.2)
BUN SERPL-MCNC: 52 MG/DL — HIGH (ref 7–23)
CALCIUM SERPL-MCNC: 8.5 MG/DL — SIGNIFICANT CHANGE UP (ref 8.4–10.5)
CHLORIDE SERPL-SCNC: 98 MMOL/L — SIGNIFICANT CHANGE UP (ref 96–108)
CO2 SERPL-SCNC: 20 MMOL/L — LOW (ref 22–31)
CREAT SERPL-MCNC: 9.17 MG/DL — HIGH (ref 0.5–1.3)
EOSINOPHIL # BLD AUTO: 0.1 K/UL — SIGNIFICANT CHANGE UP (ref 0–0.5)
EOSINOPHIL NFR BLD AUTO: 1.8 % — SIGNIFICANT CHANGE UP (ref 0–6)
GLUCOSE SERPL-MCNC: 84 MG/DL — SIGNIFICANT CHANGE UP (ref 70–99)
HCT VFR BLD CALC: 32 % — LOW (ref 34.5–45)
HGB BLD-MCNC: 10.4 G/DL — LOW (ref 11.5–15.5)
IMM GRANULOCYTES NFR BLD AUTO: 0.5 % — SIGNIFICANT CHANGE UP (ref 0–1.5)
LYMPHOCYTES # BLD AUTO: 0.74 K/UL — LOW (ref 1–3.3)
LYMPHOCYTES # BLD AUTO: 13.2 % — SIGNIFICANT CHANGE UP (ref 13–44)
MAGNESIUM SERPL-MCNC: 2.7 MG/DL — HIGH (ref 1.6–2.6)
MCHC RBC-ENTMCNC: 32.5 GM/DL — SIGNIFICANT CHANGE UP (ref 32–36)
MCHC RBC-ENTMCNC: 33.9 PG — SIGNIFICANT CHANGE UP (ref 27–34)
MCV RBC AUTO: 104.2 FL — HIGH (ref 80–100)
MONOCYTES # BLD AUTO: 0.33 K/UL — SIGNIFICANT CHANGE UP (ref 0–0.9)
MONOCYTES NFR BLD AUTO: 5.9 % — SIGNIFICANT CHANGE UP (ref 2–14)
NEUTROPHILS # BLD AUTO: 4.39 K/UL — SIGNIFICANT CHANGE UP (ref 1.8–7.4)
NEUTROPHILS NFR BLD AUTO: 78.1 % — HIGH (ref 43–77)
NRBC # BLD: 0 /100 WBCS — SIGNIFICANT CHANGE UP (ref 0–0)
PHOSPHATE SERPL-MCNC: 4.5 MG/DL — SIGNIFICANT CHANGE UP (ref 2.5–4.5)
PLATELET # BLD AUTO: 171 K/UL — SIGNIFICANT CHANGE UP (ref 150–400)
POTASSIUM SERPL-MCNC: 4.8 MMOL/L — SIGNIFICANT CHANGE UP (ref 3.5–5.3)
POTASSIUM SERPL-SCNC: 4.8 MMOL/L — SIGNIFICANT CHANGE UP (ref 3.5–5.3)
PROT SERPL-MCNC: 6.9 G/DL — SIGNIFICANT CHANGE UP (ref 6–8.3)
RBC # BLD: 3.07 M/UL — LOW (ref 3.8–5.2)
RBC # FLD: 15.2 % — HIGH (ref 10.3–14.5)
SODIUM SERPL-SCNC: 138 MMOL/L — SIGNIFICANT CHANGE UP (ref 135–145)
WBC # BLD: 5.62 K/UL — SIGNIFICANT CHANGE UP (ref 3.8–10.5)
WBC # FLD AUTO: 5.62 K/UL — SIGNIFICANT CHANGE UP (ref 3.8–10.5)

## 2020-01-27 PROCEDURE — 36906 THRMBC/NFS DIALYSIS CIRCUIT: CPT

## 2020-01-27 PROCEDURE — 76937 US GUIDE VASCULAR ACCESS: CPT | Mod: 26

## 2020-01-27 PROCEDURE — 99233 SBSQ HOSP IP/OBS HIGH 50: CPT | Mod: GC

## 2020-01-27 PROCEDURE — 99232 SBSQ HOSP IP/OBS MODERATE 35: CPT | Mod: GC

## 2020-01-27 RX ORDER — POLYETHYLENE GLYCOL 3350 17 G/17G
17 POWDER, FOR SOLUTION ORAL DAILY
Refills: 0 | Status: DISCONTINUED | OUTPATIENT
Start: 2020-01-27 | End: 2020-01-28

## 2020-01-27 RX ORDER — SENNA PLUS 8.6 MG/1
2 TABLET ORAL DAILY
Refills: 0 | Status: DISCONTINUED | OUTPATIENT
Start: 2020-01-27 | End: 2020-01-28

## 2020-01-27 RX ORDER — CHLORHEXIDINE GLUCONATE 213 G/1000ML
1 SOLUTION TOPICAL DAILY
Refills: 0 | Status: DISCONTINUED | OUTPATIENT
Start: 2020-01-27 | End: 2020-01-28

## 2020-01-27 RX ADMIN — CHLORHEXIDINE GLUCONATE 1 APPLICATION(S): 213 SOLUTION TOPICAL at 10:39

## 2020-01-27 RX ADMIN — Medication 650 MILLIGRAM(S): at 13:26

## 2020-01-27 RX ADMIN — OXYCODONE AND ACETAMINOPHEN 1 TABLET(S): 5; 325 TABLET ORAL at 23:40

## 2020-01-27 RX ADMIN — HYDROXYUREA 500 MILLIGRAM(S): 500 CAPSULE ORAL at 12:11

## 2020-01-27 RX ADMIN — HEPARIN SODIUM 5000 UNIT(S): 5000 INJECTION INTRAVENOUS; SUBCUTANEOUS at 05:17

## 2020-01-27 RX ADMIN — Medication 650 MILLIGRAM(S): at 12:14

## 2020-01-27 RX ADMIN — SENNA PLUS 2 TABLET(S): 8.6 TABLET ORAL at 12:11

## 2020-01-27 RX ADMIN — OXYCODONE AND ACETAMINOPHEN 1 TABLET(S): 5; 325 TABLET ORAL at 23:00

## 2020-01-27 NOTE — PROGRESS NOTE ADULT - SUBJECTIVE AND OBJECTIVE BOX
[facility] Division of Internal Medicine  Denise Persaud, PGY-1  Pager: [facility]: [phone] | J: [phone]    Patient is a 59y old  Female who presents with a chief complaint of AVF clot (27 Jan 2020 11:45)      SUBJECTIVE / OVERNIGHT EVENTS: No acute events overnight. Started on percocet for severe pain, has helped somewhat with sx. +nausea, No CP, SOB, fever/chills/abdominal pain/diarrhea, no LUE pain    ADDITIONAL REVIEW OF SYSTEMS:    MEDICATIONS  (STANDING):  calcium acetate 1334 milliGRAM(s) Oral three times a day with meals  chlorhexidine 2% Cloths 1 Application(s) Topical daily  heparin  Injectable 5000 Unit(s) SubCutaneous every 12 hours  hydroxyurea 500 milliGRAM(s) Oral <User Schedule>  polyethylene glycol 3350 17 Gram(s) Oral daily  senna 2 Tablet(s) Oral daily    MEDICATIONS  (PRN):  acetaminophen   Tablet .. 650 milliGRAM(s) Oral every 6 hours PRN Mild Pain (1 - 3), Moderate Pain (4 - 6)  oxycodone    5 mG/acetaminophen 325 mG 1 Tablet(s) Oral every 6 hours PRN Severe Pain (7 - 10)      CAPILLARY BLOOD GLUCOSE        I&O's Summary      PHYSICAL EXAM:  Vital Signs Last 24 Hrs  T(C): 36.6 (27 Jan 2020 12:12), Max: 36.8 (26 Jan 2020 20:32)  T(F): 97.8 (27 Jan 2020 12:12), Max: 98.2 (26 Jan 2020 20:32)  HR: 80 (27 Jan 2020 12:12) (73 - 85)  BP: 101/66 (27 Jan 2020 12:12) (97/61 - 104/65)  BP(mean): --  RR: 17 (27 Jan 2020 12:12) (16 - 18)  SpO2: 99% (27 Jan 2020 12:12) (94% - 99%)  GENERAL: NAD, well-developed  HEAD:  Atraumatic, Normocephalic  NECK: Supple, No JVD and Shiley in the RIJ, dressing clean and dry, no bleeding or swelling  CHEST/LUNG: Clear to auscultation bilaterally; No wheeze  HEART: Regular rate and rhythm; No murmurs, rubs, or gallops  ABDOMEN: Soft, Nontender, Nondistended; Bowel sounds present  EXTREMITIES: No clubbing, cyanosis, or edema, LUE AVF with no bruit felt or auscultated   PSYCH: AAOx3. Normal behavior and affect.  NEUROLOGY: non-focal. moving all extrem. normal strength.  SKIN: No rashes or lesions    LABS:                        10.4   5.62  )-----------( 171      ( 27 Jan 2020 07:16 )             32.0     01-27    138  |  98  |  52<H>  ----------------------------<  84  4.8   |  20<L>  |  9.17<H>    Ca    8.5      27 Jan 2020 07:15  Phos  4.5     01-27  Mg     2.7     01-27    TPro  6.9  /  Alb  3.7  /  TBili  1.3<H>  /  DBili  x   /  AST  10  /  ALT  6<L>  /  AlkPhos  111  01-27                RADIOLOGY & ADDITIONAL TESTS:  Results Reviewed:   Imaging Personally Reviewed:  Electrocardiogram Personally Reviewed:    COORDINATION OF CARE:  Care Discussed with Consultants/Other Providers [Y/N]:  Prior or Outpatient Records Reviewed [Y/N]:

## 2020-01-27 NOTE — PROGRESS NOTE ADULT - ASSESSMENT
Patient with ESRD on HD TIW presents to [facility] with hyperkalemia and clotted AVF, requiring urgent HD, now requires maintenance therapy.

## 2020-01-27 NOTE — PROGRESS NOTE ADULT - PROBLEM SELECTOR PLAN 1
Pt. with ESRD on HD three times a week (MWF). Last HD was on 1/25/2020 via RIJ non tunneled HD catheter. Pt. clinically stable. Labs reviewed. Will arrange for maintenance HD today. Monitor labs

## 2020-01-27 NOTE — PROGRESS NOTE ADULT - PROBLEM SELECTOR PLAN 4
Pt. with hyperphosphatemia in setting of ESRD. Continue Phoslo 1334 mg TID with meals. PTH in acceptable range. Low phosphorus diet advised. Monitor serum phosphorus

## 2020-01-27 NOTE — PROGRESS NOTE ADULT - SUBJECTIVE AND OBJECTIVE BOX
Vascular & Interventional Radiology Pre-Procedure Note    Procedure Name:  LUE fistulogram, thrombolysis/thrombectomy, possible angioplasty, possible stent placement. If unsuccessful may place a tunnelled catheter.     HPI: 59y Female with ESRD on HD with thrombosed LUE AVF currently with a RIJ non-tunnelled catheter. The patient has a Pmhx of of MAK 2 + plycythemia vera (diagnosed in 2012), c/b splenomegaly and splenic vein thrombosis (2015), pancreatic cyst, cirrhosis w/ grade 1 gastric varices of unclear etiology, PUD, HTN.      Allergies:   Medications (Abx/Cardiac/Anticoagulation/Blood Products)    heparin  Injectable: 5000 Unit(s) SubCutaneous (01-27 @ 05:17)    Data:    T(C): 36.6  HR: 80  BP: 101/66  RR: 17  SpO2: 99%    Exam  General: NAD, AAOx3  Chest: unlabored breathing  Abdomen: NTND  Extremities: LUE AVF with no thrill.     -WBC 5.62 / HgB 10.4 / Hct 32.0 / Plt 171  -Na 138 / Cl 98 / BUN 52 / Glucose 84  -K 4.8 / CO2 20 / Cr 9.17  -ALT 6 / Alk Phos 111 / T.Bili 1.3  -INR1.15    Imaging: LUE US demonstrates a thrombosed AVF.     Plan:   -59y Female presents for LUE fistulogram, thrombolysis/thrombectomy, possible angioplasty, possible stent placement. If unsuccessful may place a tunnelled catheter.   -Risks/Benefits/alternatives explained with the patient and witnessed informed consent obtained.

## 2020-01-27 NOTE — PROGRESS NOTE ADULT - SUBJECTIVE AND OBJECTIVE BOX
[facility] DIVISION OF KIDNEY DISEASES AND HYPERTENSION -- FOLLOW UP NOTE  --------------------------------------------------------------------------------  HPI: 59 year old female, PMH of MAK 2 + polycythemia vera (diagnosed in 2012), ESRD on HD (MWF) via LUE AVF (2/2 chronic GN), cirrhosis w/ grade 1 gastric varices of unclear etiology, PUD, HTN presented to the ED with AVF clot. Nephrology consulted for ESRD management. Patient follows at [facility] HD center with Dr. De León. Patient had RIJ non tunneled HD catheter placed by Vascular surgery on 1/25/2020 and went for HD.    Patient evaluated at bedside, in no acute distress. Patient planned for fistulogram today and then routine maintenance HD.    PAST HISTORY  --------------------------------------------------------------------------------  No significant changes to PMH, PSH, FHx, SHx, unless otherwise noted    ALLERGIES & MEDICATIONS  --------------------------------------------------------------------------------  Allergies    No Known Allergies    Intolerances    Standing Inpatient Medications  calcium acetate 1334 milliGRAM(s) Oral three times a day with meals  chlorhexidine 2% Cloths 1 Application(s) Topical daily  heparin  Injectable 5000 Unit(s) SubCutaneous every 12 hours  hydroxyurea 500 milliGRAM(s) Oral <User Schedule>  polyethylene glycol 3350 17 Gram(s) Oral daily  senna 2 Tablet(s) Oral daily    REVIEW OF SYSTEMS  --------------------------------------------------------------------------------  Gen: No fevers/chills  Skin: No rashes  Respiratory: No dyspnea, cough  CV: (+) mild chest pain  GI: No abdominal pain, diarrhea  : No dysuria, hematuria  MSK: trace edema  Psych: No significant depression    All other systems were reviewed and are negative, except as noted.    VITALS/PHYSICAL EXAM  --------------------------------------------------------------------------------  T(C): 36.7 (01-27-20 @ 05:11), Max: 36.8 (01-26-20 @ 20:32)  HR: 75 (01-27-20 @ 05:11) (73 - 85)  BP: 104/65 (01-27-20 @ 05:11) (97/61 - 104/65)  RR: 18 (01-27-20 @ 05:11) (16 - 18)  SpO2: 96% (01-27-20 @ 05:11) (94% - 96%)  Wt(kg): --    Physical Exam:  	Gen: NAD  	HEENT: MMM  	Pulm: CTA B/L  	CV: S1S2  	Abd: Soft, +BS   	Ext: trace LE edema B/L  	Neuro: Awake  	Skin: Warm and dry  	Vascular access: RIJ non tunneled HD catheter, no overlying bleeding. LUE AV fistula, no palpable thrill.    LABS/STUDIES  --------------------------------------------------------------------------------              10.4   5.62  >-----------<  171      [01-27-20 @ 07:16]              32.0     138  |  98  |  52  ----------------------------<  84      [01-27-20 @ 07:15]  4.8   |  20  |  9.17        Ca     8.5     [01-27-20 @ 07:15]      Mg     2.7     [01-27-20 @ 07:15]      Phos  4.5     [01-27-20 @ 07:15]    TPro  6.9  /  Alb  3.7  /  TBili  1.3  /  DBili  x   /  AST  10  /  ALT  6   /  AlkPhos  111  [01-27-20 @ 07:15]    PT/INR: PT 13.2 , INR 1.15       [01-25-20 @ 11:53]  PTT: 36.0       [01-25-20 @ 11:53]    Creatinine Trend:  SCr 9.17 [01-27 @ 07:15]  SCr 7.82 [01-26 @ 07:27]  SCr 9.82 [01-25 @ 11:53]  SCr 9.01 [01-25 @ 01:19]  SCr 9.13 [01-24 @ 23:26]

## 2020-01-27 NOTE — PROGRESS NOTE ADULT - PROBLEM SELECTOR PLAN 1
AVF clotted in the setting of polycythemia vera, has had multiple issues w/ IR thrombectomies (last one was 1 mon ago by IR according to family)  -f/u IR recs: no acute thrombectomy right now   -f/u vascular surgery recs: s/p telma for urgent HD access  -s/p MARIETTA TINSLEY of AVF, IR aware and re-consulted 1/27, pending recs, made NPO past midnight

## 2020-01-27 NOTE — PROGRESS NOTE ADULT - ASSESSMENT
60 yo F with Pmhx of of MAK 2 + plycythemia vera (diagnosed in 2012), c/b splenomegaly and splenic vein thrombosis (2015), ESRD on HD (MWF) via LUE AVF (2/2 chronic GN, started Dec 2017), pancreatic cyst, cirrhosis w/ grade 1 gastric varices of unclear etiology, PUD, HTN pw AVF clot and missed HD, now w/ KYLE hollis, s/p urgent HD, s/p US of AVF, pending further recs per IR

## 2020-01-27 NOTE — PROGRESS NOTE ADULT - SUBJECTIVE AND OBJECTIVE BOX
Vascular & Interventional Radiology Post-Procedure Note    Pre-Procedure Diagnosis: thrombosed AVF  Post-Procedure Diagnosis: Same as pre.  Indications for Procedure: thrombosed AVF    Attending: Dr. Frederick  Resident: Dr. Alexander    Procedure Details/Findings: Successful pharmacomechanical thrombolysis/thrombectomy, angioplasty and stent placement of LUE AVF with good thrill.   Access (if applicable): AVF    Complications: Small clot traveled into the distal artery, successfully thrombectomy, infusion of 2mg TPA. Post angiogram demonstrated good flow to the fingers. + radial pulse.   Estimated Blood Loss: Minimal  Specimen: none  Contrast: 110cc Omnipaque  Sedation: none  Patient Condition/Disposition: Stable    Plan:   - LUE AVF can be used immediately for HD. Vascular & Interventional Radiology Post-Procedure Note    Pre-Procedure Diagnosis: thrombosed AVF  Post-Procedure Diagnosis: Same as pre.  Indications for Procedure: thrombosed AVF    Attending: Dr. Frederick  Resident: Dr. Alexander    Procedure Details/Findings: Successful pharmacomechanical thrombolysis/thrombectomy, angioplasty and stent placement of LUE AVF with good thrill.   Access (if applicable): AVF    Complications: Small clot traveled into the distal artery, successfully thrombectomy, infusion of 2mg TPA. Post angiogram demonstrated good flow to the fingers. + radial pulse.   Estimated Blood Loss: Minimal  Specimen: none  Contrast: 110cc Omnipaque  Sedation: none  Patient Condition/Disposition: Stable    Plan:   - LUE AVF can be used immediately for HD.   - Patient should undergo 1 successful HD session prior to removing non-tunnelled HD cath.

## 2020-01-27 NOTE — PROGRESS NOTE ADULT - PROBLEM SELECTOR PLAN 2
HD (MWF) via L AVF placed 2 years ago and known to have thromboses multiple times  -f/u nephro recs on urgent HD:  --Shiley placed, urgent HD session 1/25, continued MWF HD  --elevated PTH on 1/26: 175, will f/u nephro recs   -F/u with her nephrologist Dr. De León at [facility]

## 2020-01-28 ENCOUNTER — TRANSCRIPTION ENCOUNTER (OUTPATIENT)
Age: 60
End: 2020-01-28

## 2020-01-28 VITALS
SYSTOLIC BLOOD PRESSURE: 106 MMHG | RESPIRATION RATE: 17 BRPM | HEART RATE: 76 BPM | OXYGEN SATURATION: 96 % | DIASTOLIC BLOOD PRESSURE: 71 MMHG | TEMPERATURE: 97 F

## 2020-01-28 LAB
ANION GAP SERPL CALC-SCNC: 17 MMOL/L — SIGNIFICANT CHANGE UP (ref 5–17)
BUN SERPL-MCNC: 23 MG/DL — SIGNIFICANT CHANGE UP (ref 7–23)
CALCIUM SERPL-MCNC: 8.7 MG/DL — SIGNIFICANT CHANGE UP (ref 8.4–10.5)
CHLORIDE SERPL-SCNC: 97 MMOL/L — SIGNIFICANT CHANGE UP (ref 96–108)
CO2 SERPL-SCNC: 25 MMOL/L — SIGNIFICANT CHANGE UP (ref 22–31)
CREAT SERPL-MCNC: 5.51 MG/DL — HIGH (ref 0.5–1.3)
GLUCOSE SERPL-MCNC: 87 MG/DL — SIGNIFICANT CHANGE UP (ref 70–99)
HCT VFR BLD CALC: 36.5 % — SIGNIFICANT CHANGE UP (ref 34.5–45)
HGB BLD-MCNC: 11.7 G/DL — SIGNIFICANT CHANGE UP (ref 11.5–15.5)
MAGNESIUM SERPL-MCNC: 2.4 MG/DL — SIGNIFICANT CHANGE UP (ref 1.6–2.6)
MCHC RBC-ENTMCNC: 32.1 GM/DL — SIGNIFICANT CHANGE UP (ref 32–36)
MCHC RBC-ENTMCNC: 33.1 PG — SIGNIFICANT CHANGE UP (ref 27–34)
MCV RBC AUTO: 103.4 FL — HIGH (ref 80–100)
NRBC # BLD: 0 /100 WBCS — SIGNIFICANT CHANGE UP (ref 0–0)
PHOSPHATE SERPL-MCNC: 4.1 MG/DL — SIGNIFICANT CHANGE UP (ref 2.5–4.5)
PLATELET # BLD AUTO: 180 K/UL — SIGNIFICANT CHANGE UP (ref 150–400)
POTASSIUM SERPL-MCNC: 4.1 MMOL/L — SIGNIFICANT CHANGE UP (ref 3.5–5.3)
POTASSIUM SERPL-SCNC: 4.1 MMOL/L — SIGNIFICANT CHANGE UP (ref 3.5–5.3)
RBC # BLD: 3.53 M/UL — LOW (ref 3.8–5.2)
RBC # FLD: 15.2 % — HIGH (ref 10.3–14.5)
SODIUM SERPL-SCNC: 139 MMOL/L — SIGNIFICANT CHANGE UP (ref 135–145)
WBC # BLD: 5.61 K/UL — SIGNIFICANT CHANGE UP (ref 3.8–10.5)
WBC # FLD AUTO: 5.61 K/UL — SIGNIFICANT CHANGE UP (ref 3.8–10.5)

## 2020-01-28 PROCEDURE — 36906 THRMBC/NFS DIALYSIS CIRCUIT: CPT

## 2020-01-28 PROCEDURE — 82435 ASSAY OF BLOOD CHLORIDE: CPT

## 2020-01-28 PROCEDURE — C1887: CPT

## 2020-01-28 PROCEDURE — 83605 ASSAY OF LACTIC ACID: CPT

## 2020-01-28 PROCEDURE — 85730 THROMBOPLASTIN TIME PARTIAL: CPT

## 2020-01-28 PROCEDURE — 94640 AIRWAY INHALATION TREATMENT: CPT

## 2020-01-28 PROCEDURE — 86901 BLOOD TYPING SEROLOGIC RH(D): CPT

## 2020-01-28 PROCEDURE — 84132 ASSAY OF SERUM POTASSIUM: CPT

## 2020-01-28 PROCEDURE — 85014 HEMATOCRIT: CPT

## 2020-01-28 PROCEDURE — 99024 POSTOP FOLLOW-UP VISIT: CPT

## 2020-01-28 PROCEDURE — 80048 BASIC METABOLIC PNL TOTAL CA: CPT

## 2020-01-28 PROCEDURE — 84295 ASSAY OF SERUM SODIUM: CPT

## 2020-01-28 PROCEDURE — 86900 BLOOD TYPING SEROLOGIC ABO: CPT

## 2020-01-28 PROCEDURE — C1769: CPT

## 2020-01-28 PROCEDURE — C1725: CPT

## 2020-01-28 PROCEDURE — 83970 ASSAY OF PARATHORMONE: CPT

## 2020-01-28 PROCEDURE — 99285 EMERGENCY DEPT VISIT HI MDM: CPT

## 2020-01-28 PROCEDURE — 82962 GLUCOSE BLOOD TEST: CPT

## 2020-01-28 PROCEDURE — 85610 PROTHROMBIN TIME: CPT

## 2020-01-28 PROCEDURE — 86850 RBC ANTIBODY SCREEN: CPT

## 2020-01-28 PROCEDURE — 84100 ASSAY OF PHOSPHORUS: CPT

## 2020-01-28 PROCEDURE — 82947 ASSAY GLUCOSE BLOOD QUANT: CPT

## 2020-01-28 PROCEDURE — C1757: CPT

## 2020-01-28 PROCEDURE — 86706 HEP B SURFACE ANTIBODY: CPT

## 2020-01-28 PROCEDURE — 82803 BLOOD GASES ANY COMBINATION: CPT

## 2020-01-28 PROCEDURE — 93005 ELECTROCARDIOGRAM TRACING: CPT

## 2020-01-28 PROCEDURE — 71045 X-RAY EXAM CHEST 1 VIEW: CPT

## 2020-01-28 PROCEDURE — 82330 ASSAY OF CALCIUM: CPT

## 2020-01-28 PROCEDURE — 83735 ASSAY OF MAGNESIUM: CPT

## 2020-01-28 PROCEDURE — 85027 COMPLETE CBC AUTOMATED: CPT

## 2020-01-28 PROCEDURE — 71046 X-RAY EXAM CHEST 2 VIEWS: CPT

## 2020-01-28 PROCEDURE — 80053 COMPREHEN METABOLIC PANEL: CPT

## 2020-01-28 PROCEDURE — C1894: CPT

## 2020-01-28 PROCEDURE — 76937 US GUIDE VASCULAR ACCESS: CPT

## 2020-01-28 PROCEDURE — 93990 DOPPLER FLOW TESTING: CPT

## 2020-01-28 PROCEDURE — 93990 DOPPLER FLOW TESTING: CPT | Mod: 26

## 2020-01-28 PROCEDURE — 99233 SBSQ HOSP IP/OBS HIGH 50: CPT | Mod: GC

## 2020-01-28 PROCEDURE — 99261: CPT

## 2020-01-28 PROCEDURE — 82310 ASSAY OF CALCIUM: CPT

## 2020-01-28 RX ORDER — LANOLIN ALCOHOL/MO/W.PET/CERES
1 CREAM (GRAM) TOPICAL AT BEDTIME
Refills: 0 | Status: DISCONTINUED | OUTPATIENT
Start: 2020-01-28 | End: 2020-01-28

## 2020-01-28 RX ORDER — MAGNESIUM OXIDE 400 MG ORAL TABLET 241.3 MG
400 TABLET ORAL ONCE
Refills: 0 | Status: COMPLETED | OUTPATIENT
Start: 2020-01-28 | End: 2020-01-28

## 2020-01-28 RX ORDER — OXYCODONE AND ACETAMINOPHEN 5; 325 MG/1; MG/1
1 TABLET ORAL EVERY 6 HOURS
Refills: 0 | Status: DISCONTINUED | OUTPATIENT
Start: 2020-01-28 | End: 2020-01-28

## 2020-01-28 RX ORDER — CALCIUM CARBONATE 500(1250)
1 TABLET ORAL ONCE
Refills: 0 | Status: COMPLETED | OUTPATIENT
Start: 2020-01-28 | End: 2020-01-28

## 2020-01-28 RX ADMIN — Medication 1334 MILLIGRAM(S): at 09:00

## 2020-01-28 RX ADMIN — POLYETHYLENE GLYCOL 3350 17 GRAM(S): 17 POWDER, FOR SOLUTION ORAL at 10:15

## 2020-01-28 RX ADMIN — Medication 1 TABLET(S): at 16:12

## 2020-01-28 RX ADMIN — OXYCODONE AND ACETAMINOPHEN 1 TABLET(S): 5; 325 TABLET ORAL at 11:15

## 2020-01-28 RX ADMIN — OXYCODONE AND ACETAMINOPHEN 1 TABLET(S): 5; 325 TABLET ORAL at 10:15

## 2020-01-28 RX ADMIN — HEPARIN SODIUM 5000 UNIT(S): 5000 INJECTION INTRAVENOUS; SUBCUTANEOUS at 05:13

## 2020-01-28 RX ADMIN — SENNA PLUS 2 TABLET(S): 8.6 TABLET ORAL at 09:01

## 2020-01-28 RX ADMIN — Medication 1 MILLIGRAM(S): at 02:09

## 2020-01-28 RX ADMIN — Medication 1334 MILLIGRAM(S): at 12:52

## 2020-01-28 RX ADMIN — MAGNESIUM OXIDE 400 MG ORAL TABLET 400 MILLIGRAM(S): 241.3 TABLET ORAL at 16:12

## 2020-01-28 NOTE — DISCHARGE NOTE PROVIDER - HOSPITAL COURSE
59F with PMH Of polycythemia vera, ESRD on HD (MWF via L AVF) 2/2 chronic GN, cirrhosis with grade 1 gastric varices of unclear etiology, PUD, and HTN admitted on 1/25/20 with a clotted AVF and hyperkalemia. She was receiving dialysis that day when her fistula became thrombosed. Upon admission, a RIJ non-tunneled HD catheter was placed by Vascular Surgery and patient underwent urgent HD. Doppler ultrasound of the left upper extremity confirmed thrombosis of her fistula and she underwent successful pharmacomechanical thrombolysis/thrombectomy, angioplasty, and stent placement of LUE AVF. She received HD following the procedure and was medically stable for discharge on 1/28/20 with outpatient Nephrology follow-up. 59F with PMH Of polycythemia vera, ESRD on HD (MWF via L AVF) 2/2 chronic GN, cirrhosis with grade 1 gastric varices of unclear etiology, PUD, and HTN admitted on 1/25/20 with a clotted AVF and hyperkalemia. She was receiving dialysis that day when her fistula became thrombosed. Upon admission, a RIJ non-tunneled HD catheter was placed by Vascular Surgery and patient underwent urgent HD. Doppler ultrasound of the left upper extremity confirmed thrombosis of her fistula and she underwent successful pharmacomechanical thrombolysis/thrombectomy, angioplasty, and stent placement of LUE AVF. She received HD following the procedure and was medically stable for discharge on 1/28/20 with outpatient follow-up.

## 2020-01-28 NOTE — DISCHARGE NOTE PROVIDER - NSDCMRMEDTOKEN_GEN_ALL_CORE_FT
calcium acetate 667 mg oral capsule: 2 tab(s) orally 3 times a day  hydroxyurea 500 mg oral capsule: 1 cap(s) orally Monday, Wednesday, and Friday

## 2020-01-28 NOTE — PROGRESS NOTE ADULT - PROBLEM SELECTOR PROBLEM 1
ESRD on hemodialysis
Thrombosis of arteriovenous fistula, initial encounter

## 2020-01-28 NOTE — PROGRESS NOTE ADULT - PROBLEM SELECTOR PLAN 2
HD (MWF) via L AVF placed 2 years ago and known to have thromboses multiple times  -f/u nephro recs on urgent HD:  --Shiley placed, urgent HD session 1/25, continued MWF HD  --elevated PTH on 1/26: 175, will f/u nephro recs   -F/u with her nephrologist Dr. De León at [facility] HD (MWF) via L AVF placed 2 years ago and known to have thromboses multiple times  -f/u nephro recs on urgent HD:  --Darby placed, urgent HD session 1/25, continued MWF HD  -F/u with her nephrologist Dr. De León at [facility]

## 2020-01-28 NOTE — PROGRESS NOTE ADULT - ATTENDING COMMENTS
As above, I performed the procedure with assistance from Dr. Alexander and was present the entire time.
ESRD on HD MWF  AVF clotted access  Pt with temp cath placed   HD today and fistulogram with IR  Dr Dempsey aware    Angle Vazquez MD  Off: [phone]  Cell: [phone]
pt seen and examined.  above plan discussed on rounds today.  In addition,    ESRD with Hyperkalemia after missing HD as AVF not working:   -now resolved s/p urgent HD  -s/p vacular R IJ shiley in place    Nonfunctioning Left AVF:   - Pt with clotted AVF s/p declotting by IR today.  - pt will need an trial of HD with AV fistula after declotting prior to Shiley removal.
Pt seen and examined.  Above plan discussed on rounds today.    In addition,    ESRD with Hyperkalemia after missing HD as AVF not working:   -now resolved s/p urgent HD    Nonfunctioning Left AVF:   - Resolved s/p LUE AV fistulogram with stent and angioplasty --> pt did have a functioning HD session.     pt cleared by Renal and IR for discharge.  Discharge time spent: 32 min
ESRD with Hyperkalemia after missing HD as AVF not working:   -now resolved s/p urgent HD  -s/p vacular R IJ shiley in place, no bleeding, CXR confirmed placement    Nonfunctioning Left AVF: no thrill on exam, nontender, not swollen  -Duplex of Left AVF pending  -IR aware and will consider fixing AVF on monday, NPO after MN  -vascular following
presented with Missed HD yesterday due to nonfunctioning left AVF, last HD was Wednesday, no pain in LUE, no compliants  -Hyperkalemia 5.8 with questionable peaked T waves on initial EKG  -vascular placed RIJ shiley and was placement confirmed on CXR, was anxious post placement but remained HD stable with normal CXR and no sign of bleeding  -was given hyper K cocktail with improvement in T waves and K 5.3 for urgent HD with renal today  -IR consulted and rec LUE duplex, AVF likely to be fixed monday by IR  -monitor bmps

## 2020-01-28 NOTE — PROGRESS NOTE ADULT - ASSESSMENT
60 yo F with Pmhx of of MAK 2 + plycythemia vera (diagnosed in 2012), c/b splenomegaly and splenic vein thrombosis (2015), ESRD on HD (MWF) via LUE AVF (2/2 chronic GN, started Dec 2017), pancreatic cyst, cirrhosis w/ grade 1 gastric varices of unclear etiology, PUD, HTN pw AVF clot and missed HD, now w/ KYLE hollis, s/p AVF thrombectomy but AVF re-thrombosed. 60 yo F with Pmhx of of MAK 2 + plycythemia vera (diagnosed in 2012), c/b splenomegaly and splenic vein thrombosis (2015), ESRD on HD (MWF) via LUE AVF (2/2 chronic GN, started Dec 2017), pancreatic cyst, cirrhosis w/ grade 1 gastric varices of unclear etiology, PUD, HTN pw AVF clot and missed HD, now w/ KYLE hollis, s/p AVF thrombectomy

## 2020-01-28 NOTE — PROGRESS NOTE ADULT - SUBJECTIVE AND OBJECTIVE BOX
[facility] Division of Internal Medicine  Denise Persaud, PGY-1  Pager: [facility]: [phone] | [facility]: [phone]    Patient is a 59y old  Female who presents with a chief complaint of AVF clot (27 Jan 2020 17:30)      SUBJECTIVE / OVERNIGHT EVENTS: s/p AVF thrombectomy per IR, however AVF re-thrombosed overnight.   ADDITIONAL REVIEW OF SYSTEMS:    MEDICATIONS  (STANDING):  calcium acetate 1334 milliGRAM(s) Oral three times a day with meals  chlorhexidine 2% Cloths 1 Application(s) Topical daily  heparin  Injectable 5000 Unit(s) SubCutaneous every 12 hours  hydroxyurea 500 milliGRAM(s) Oral <User Schedule>  melatonin 1 milliGRAM(s) Oral at bedtime  polyethylene glycol 3350 17 Gram(s) Oral daily  senna 2 Tablet(s) Oral daily    MEDICATIONS  (PRN):  acetaminophen   Tablet .. 650 milliGRAM(s) Oral every 6 hours PRN Mild Pain (1 - 3), Moderate Pain (4 - 6)  oxycodone    5 mG/acetaminophen 325 mG 1 Tablet(s) Oral every 6 hours PRN Severe Pain (7 - 10)      CAPILLARY BLOOD GLUCOSE        I&O's Summary    27 Jan 2020 07:01  -  28 Jan 2020 07:00  --------------------------------------------------------  IN: 800 mL / OUT: 1800 mL / NET: -1000 mL        PHYSICAL EXAM:  Vital Signs Last 24 Hrs  T(C): 36.6 (28 Jan 2020 04:46), Max: 36.9 (27 Jan 2020 23:40)  T(F): 97.9 (28 Jan 2020 04:46), Max: 98.4 (27 Jan 2020 23:40)  HR: 85 (28 Jan 2020 04:46) (80 - 97)  BP: 95/62 (28 Jan 2020 04:46) (95/62 - 123/68)  BP(mean): --  RR: 18 (28 Jan 2020 04:46) (17 - 18)  SpO2: 96% (28 Jan 2020 04:46) (94% - 100%)  CONSTITUTIONAL: NAD, well-developed, well-groomed  EYES: PERRLA; conjunctiva and sclera clear  ENMT: Moist oral mucosa, no pharyngeal injection or exudates; normal dentition  NECK: Supple, no palpable masses; no thyromegaly  RESPIRATORY: Normal respiratory effort; lungs are clear to auscultation bilaterally  CARDIOVASCULAR: Regular rate and rhythm, normal S1 and S2, no murmur/rub/gallop; No lower extremity edema; Peripheral pulses are 2+ bilaterally  ABDOMEN: Nontender to palpation, normoactive bowel sounds, no rebound/guarding; No hepatosplenomegaly  MUSCULOSKELETAL:  Normal gait; no clubbing or cyanosis of digits; no joint swelling or tenderness to palpation  PSYCH: A+O to person, place, and time; affect appropriate  NEUROLOGY: CN 2-12 are intact and symmetric; no gross sensory deficits   SKIN: No rashes; no palpable lesions    LABS:                        11.7   5.61  )-----------( 180      ( 28 Jan 2020 06:48 )             36.5     01-28    139  |  97  |  23  ----------------------------<  87  4.1   |  25  |  5.51<H>    Ca    8.7      28 Jan 2020 06:48  Phos  4.1     01-28  Mg     2.4     01-28    TPro  6.9  /  Alb  3.7  /  TBili  1.3<H>  /  DBili  x   /  AST  10  /  ALT  6<L>  /  AlkPhos  111  01-27                RADIOLOGY & ADDITIONAL TESTS:  Results Reviewed:   Imaging Personally Reviewed:  Electrocardiogram Personally Reviewed:    COORDINATION OF CARE:  Care Discussed with Consultants/Other Providers [Y/N]:  Prior or Outpatient Records Reviewed [Y/N]: [facility] Division of Internal Medicine  Josuelakisha Hung, PGY-1  Pager: [facility]: [phone] | J: [phone]    Patient is a 59y old  Female who presents with a chief complaint of AVF clot (27 Jan 2020 17:30)      SUBJECTIVE / OVERNIGHT EVENTS: s/p AVF thrombectomy per IR, however according to RN AVF re-thrombosed overnight.    ADDITIONAL REVIEW OF SYSTEMS: Some pain at AVF site, pain at site of RIJ Shiley, improved w/oxycodone. No CP, SOB, nausea/vomiting/diarrhea/abdominal pain, chills or fevers.     MEDICATIONS  (STANDING):  calcium acetate 1334 milliGRAM(s) Oral three times a day with meals  chlorhexidine 2% Cloths 1 Application(s) Topical daily  heparin  Injectable 5000 Unit(s) SubCutaneous every 12 hours  hydroxyurea 500 milliGRAM(s) Oral <User Schedule>  melatonin 1 milliGRAM(s) Oral at bedtime  polyethylene glycol 3350 17 Gram(s) Oral daily  senna 2 Tablet(s) Oral daily    MEDICATIONS  (PRN):  acetaminophen   Tablet .. 650 milliGRAM(s) Oral every 6 hours PRN Mild Pain (1 - 3), Moderate Pain (4 - 6)  oxycodone    5 mG/acetaminophen 325 mG 1 Tablet(s) Oral every 6 hours PRN Severe Pain (7 - 10)      CAPILLARY BLOOD GLUCOSE        I&O's Summary    27 Jan 2020 07:01  -  28 Jan 2020 07:00  --------------------------------------------------------  IN: 800 mL / OUT: 1800 mL / NET: -1000 mL        PHYSICAL EXAM:  Vital Signs Last 24 Hrs  T(C): 36.6 (28 Jan 2020 04:46), Max: 36.9 (27 Jan 2020 23:40)  T(F): 97.9 (28 Jan 2020 04:46), Max: 98.4 (27 Jan 2020 23:40)  HR: 85 (28 Jan 2020 04:46) (80 - 97)  BP: 95/62 (28 Jan 2020 04:46) (95/62 - 123/68)  BP(mean): --  RR: 18 (28 Jan 2020 04:46) (17 - 18)  SpO2: 96% (28 Jan 2020 04:46) (94% - 100%)  GENERAL: NAD, well-developed, mild destress  HEAD:  Atraumatic, Normocephalic  NECK: Supple, No JVD and Shiley in the RIJ, dressing clean and dry, no bleeding or swelling  CHEST/LUNG: Clear to auscultation bilaterally; No wheeze  HEART: Regular rate and rhythm; No murmurs, rubs, or gallops  ABDOMEN: Soft, Nontender, Nondistended; Bowel sounds present  EXTREMITIES: No clubbing, cyanosis, or edema, LUE AVF with 2 stitches, c/d/i, no thrill appreciated, no erythema     PSYCH: AAOx3. Normal behavior and affect.  NEUROLOGY: non-focal. moving all extrem. normal strength.  SKIN: No rashes or lesions    LABS:                        11.7   5.61  )-----------( 180      ( 28 Jan 2020 06:48 )             36.5     01-28    139  |  97  |  23  ----------------------------<  87  4.1   |  25  |  5.51<H>    Ca    8.7      28 Jan 2020 06:48  Phos  4.1     01-28  Mg     2.4     01-28    TPro  6.9  /  Alb  3.7  /  TBili  1.3<H>  /  DBili  x   /  AST  10  /  ALT  6<L>  /  AlkPhos  111  01-27                RADIOLOGY & ADDITIONAL TESTS:  Results Reviewed:   Imaging Personally Reviewed:  Electrocardiogram Personally Reviewed:    COORDINATION OF CARE:  Care Discussed with Consultants/Other Providers [Y/N]:  Prior or Outpatient Records Reviewed [Y/N]:

## 2020-01-28 NOTE — DISCHARGE NOTE PROVIDER - NSDCFUADDAPPT_GEN_ALL_CORE_FT
Please schedule an appointment with your primary care doctor after discharge. You may call [phone] to make your appointment. The office is located at [address] [place] [address] in [place], [place].

## 2020-01-28 NOTE — DISCHARGE NOTE PROVIDER - NSDCCPCAREPLAN_GEN_ALL_CORE_FT
PRINCIPAL DISCHARGE DIAGNOSIS  Diagnosis: Thrombosis of arteriovenous fistula, initial encounter  Assessment and Plan of Treatment: You came to the hospital because your AV fistula was no longer working. The interventional radiologists removed the clot and placed a stent so that your fistula can now be used for dialysis. Please resume your normal dialysis schedule and go to your scheduled appointment with Vascular Surgery.

## 2020-01-28 NOTE — PROGRESS NOTE ADULT - PROBLEM SELECTOR PLAN 1
AVF clotted in the setting of polycythemia vera, has had multiple issues w/ IR thrombectomies (last one was 1 mon ago by IR according to family)  -f/u IR recs: no acute thrombectomy right now   -f/u vascular surgery recs: s/p telma for urgent HD access  -s/p MARIETTA TINSLEY of AVF, IR aware and re-consulted 1/27, pending recs, made NPO past midnight AVF clotted in the setting of polycythemia vera, has had multiple issues w/ IR thrombectomies (last one was 1 mon ago by IR according to family)  -f/u IR recs: s/p AVF thrombectomy 1/27  -f/u vascular surgery recs: s/p telma for urgent HD access  -concern for re-thrombosis of AVF, repeat US showed patent flow, will f/u w/nephrology for HD session

## 2020-01-28 NOTE — PROGRESS NOTE ADULT - SUBJECTIVE AND OBJECTIVE BOX
Interventional Radiology    S/P LUE AV Fistulogram with angioplasty and stent placement, POD # 1.  Procedure was complicated by small clot traveled into the distal artery.  Pt denies pain in right arm or numbness or tingling in her right arm.  Pt reported the LUE AVF was used for HD yesterday without difficulty.     Vital Signs Last 24 Hrs  T(C): 36.3 (28 Jan 2020 12:54), Max: 36.9 (27 Jan 2020 23:40)  T(F): 97.3 (28 Jan 2020 12:54), Max: 98.4 (27 Jan 2020 23:40)  HR: 76 (28 Jan 2020 12:54) (76 - 97)  BP: 106/71 (28 Jan 2020 12:54) (95/62 - 123/68)  BP(mean): --  RR: 17 (28 Jan 2020 12:54) (17 - 18)  SpO2: 96% (28 Jan 2020 12:54) (94% - 100%)    General Appearance:     Procedure Site (Left arm AV Fistula): sutures removed.  hemostasis secure and dressing applied over the site.  + thrill.     LUE: brachial pulse 2+, radial pulse 2+, arm is warm     LABS:               11.7   5.61  )-----------( 180      ( 28 Jan 2020 06:48 )             36.5     01-28    139  |  97  |  23  ----------------------------<  87  4.1   |  25  |  5.51<H>    Ca    8.7      28 Jan 2020 06:48  Phos  4.1     01-28  Mg     2.4     01-28    TPro  6.9  /  Alb  3.7  /  TBili  1.3<H>  /  DBili  x   /  AST  10  /  ALT  6<L>  /  AlkPhos  111  01-27    A/P  59y Female with H/O ESRD ON HD S/P LUE AV fistulogram with stent and angioplasty on 1/27/20    IR will sign off please re-consult IR as needed.  Continue global medical management as per primary team.    Mumtaz Willis, CIERRAC  [facility] [id number]  Ext [phone]

## 2020-01-28 NOTE — DISCHARGE NOTE PROVIDER - NSDCFUSCHEDAPPT_GEN_ALL_CORE_FT
HU, MIHYEON ; 02/11/2020 ; [facility] [address] HU, MIHYEON ; 02/11/2020 ; [facility] Surg [facility] [address] HU, MIHYEON ; 04/02/2020 ; JONELP Lili Chow

## 2020-01-28 NOTE — DISCHARGE NOTE NURSING/CASE MANAGEMENT/SOCIAL WORK - PATIENT PORTAL LINK FT
You can access the FollowMyHealth Patient Portal offered by [facility] by registering at the following website: [email or web address]. By joining [email or web address]’s FollowMyHealth portal, you will also be able to view your health information using other applications (apps) compatible with our system.

## 2020-01-28 NOTE — DISCHARGE NOTE PROVIDER - CARE PROVIDER_API CALL
Jacky Guo)  Hematology; Internal Medicine; Oncology  [address], [place] [id number]  Phone: [phone]  Fax: [phone]  Follow Up Time:     Azucena De León)  Internal Medicine; Nephrology  [address] [facility] [address] [place] [address]  Phone: [phone]  Fax: [phone]  Follow Up Time:

## 2020-02-05 NOTE — ED ADULT NURSE NOTE - ED STAT RN HANDOFF TIME
19:07 Hatchet Flap Text: The defect edges were debeveled with a #15 scalpel blade.  Given the location of the defect, shape of the defect and the proximity to free margins a hatchet flap was deemed most appropriate.  Using a sterile surgical marker, an appropriate hatchet flap was drawn incorporating the defect and placing the expected incisions within the relaxed skin tension lines where possible.    The area thus outlined was incised deep to adipose tissue with a #15 scalpel blade.  The skin margins were undermined to an appropriate distance in all directions utilizing iris scissors.

## 2020-02-07 NOTE — DIETITIAN INITIAL EVALUATION ADULT. - FEEDING SKILL
[1 Year Ago] : 1 year ago [Good] : being in good health independent [Healthy Diet] : a healthy diet [Regular Exercise] : regular exercise [Up to Date] : up to date with ~his/her~ STD screening [Menarche ____ yo] : menarche at [unfilled] yo [M. Frequency ___ (days)] : menses frequency [unfilled] days [M. Duration ___ (days)] : menses duration [unfilled] day(s) [Weight Concerns] : no concerns with her weight [Fever] : no fever [Menstrual Problems] : reports normal menses [Nausea] : no nausea [Diarrhea] : no diarrhea [Vomiting] : no vomiting [Vaginal Bleeding] : no vaginal bleeding [Pelvic Pressure] : no pelvic pressure [Dysuria] : no dysuria [Burning] : no burning [Lesion] : no lesion

## 2020-02-15 NOTE — PROGRESS NOTE ADULT - PROBLEM SELECTOR PLAN 3
Hgb 14-16 with ESRD. 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 Chester who she has not seen in 2-3 years and previously was on Hydrea but stopped   -f/u with onc if phlebotomy, hydrea, or aspirin should be started as inpatient vs outpatient
Now well controlled on binders.  - C/w PhosLo 2 tabs TID w/ meals.    - Monitor phos levels.
Pt with cirrhosis of unclear etiology, scheduled to see hepatology at the end of this month.  Monitor LFTs.
Hgb 14-16 with ESRD. 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
I have reviewed and confirmed nurses' notes for patient's medications, allergies, medical history, and surgical history.

## 2020-02-27 ENCOUNTER — APPOINTMENT (OUTPATIENT)
Dept: VASCULAR SURGERY | Facility: CLINIC | Age: 60
End: 2020-02-27
Payer: MEDICAID

## 2020-02-27 PROCEDURE — 99214 OFFICE O/P EST MOD 30 MIN: CPT

## 2020-02-27 PROCEDURE — 93990 DOPPLER FLOW TESTING: CPT

## 2020-02-27 NOTE — PHYSICAL EXAM
[Thrill] : thrill [Aneurysm] : aneurysm [Pulsatile Thrill] : pulsatile thrill [Hand well perfused] : hand well perfused [Bleeding] : no bleeding [Warm Extremities] : warm extremities [Ulcer] : no ulcer [2+] : left 2+ [Normal] : coordination grossly intact, no focal deficits [de-identified] :  strength 5/5 b/l upper extremities [de-identified] : intact

## 2020-02-27 NOTE — DISCUSSION/SUMMARY
[FreeTextEntry1] : 58 yo female with history of esrd on hd via left upper extremity basilic avf presents for follow up \par duplex shows >75% in stent stenosis at the axilla \par has poor flow\par will arrange for left upper extremity fistulogram

## 2020-02-27 NOTE — HISTORY OF PRESENT ILLNESS
[FreeTextEntry1] : 60 yo female with history of esrd on hd via left upper extremity basilic avf presents for follow up \par pt reports occasional prolonged bleeding Patient recently was in the hospital with fistula thrombosis and underwent thrombectomy there [] : left basilic fistula

## 2020-03-05 ENCOUNTER — APPOINTMENT (OUTPATIENT)
Dept: ENDOVASCULAR SURGERY | Facility: CLINIC | Age: 60
End: 2020-03-05
Payer: MEDICAID

## 2020-03-05 VITALS
OXYGEN SATURATION: 97 % | BODY MASS INDEX: 23.74 KG/M2 | HEIGHT: 62 IN | DIASTOLIC BLOOD PRESSURE: 64 MMHG | TEMPERATURE: 97.6 F | SYSTOLIC BLOOD PRESSURE: 93 MMHG | HEART RATE: 87 BPM | WEIGHT: 129 LBS | RESPIRATION RATE: 15 BRPM

## 2020-03-05 PROCEDURE — 36902Z: CUSTOM

## 2020-03-05 RX ORDER — LACTULOSE 10 G/15ML
10 SOLUTION ORAL
Qty: 1 | Refills: 0 | Status: DISCONTINUED | COMMUNITY
Start: 2020-01-30 | End: 2020-03-05

## 2020-03-05 NOTE — ASSESSMENT
[FreeTextEntry1] : ESRD on HD s/p thrombectomy 1/27/20 now for f/u fistulogram.  Consent obtained.  Palpable thrill noted within fistula.\par \par Moderate stenosis in outflow vein PTA to 8mm with good result.  Full report to follow.  EBL= 5 cc.

## 2020-03-05 NOTE — HISTORY OF PRESENT ILLNESS
[] : left basilic fistula [FreeTextEntry1] : Dr Dempsey  9/18/2017 [FreeTextEntry4] : yesterday  [FreeTextEntry5] : yesterday at 8:30 pm [FreeTextEntry6] : Dr Koroma

## 2020-03-05 NOTE — PROCEDURE
[D/C IV on discharge] : D/C IV on discharge [Resume diet] : resume diet [Site check for bleeding/hematoma/thrill/bruit] : Site check for bleeding/hematoma/thrill/bruit [Vital signs on admission the q 15 mins x2] : Vital signs on admission the q 15 mins x2 [FreeTextEntry1] : left arm fistula /angioplasty

## 2020-03-26 ENCOUNTER — OUTPATIENT (OUTPATIENT)
Dept: OUTPATIENT SERVICES | Facility: HOSPITAL | Age: 60
LOS: 1 days | Discharge: HOME | End: 2020-03-26

## 2020-03-26 DIAGNOSIS — I77.0 ARTERIOVENOUS FISTULA, ACQUIRED: Chronic | ICD-10-CM

## 2020-03-26 DIAGNOSIS — D75.1 SECONDARY POLYCYTHEMIA: ICD-10-CM

## 2020-03-26 DIAGNOSIS — Z99.2 DEPENDENCE ON RENAL DIALYSIS: Chronic | ICD-10-CM

## 2020-03-26 DIAGNOSIS — T85.898A OTHER SPECIFIED COMPLICATION OF OTHER INTERNAL PROSTHETIC DEVICES, IMPLANTS AND GRAFTS, INITIAL ENCOUNTER: Chronic | ICD-10-CM

## 2020-04-02 ENCOUNTER — APPOINTMENT (OUTPATIENT)
Dept: HEMATOLOGY ONCOLOGY | Facility: CLINIC | Age: 60
End: 2020-04-02

## 2020-04-28 ENCOUNTER — APPOINTMENT (OUTPATIENT)
Dept: ENDOVASCULAR SURGERY | Facility: CLINIC | Age: 60
End: 2020-04-28

## 2020-05-27 ENCOUNTER — OUTPATIENT (OUTPATIENT)
Dept: OUTPATIENT SERVICES | Facility: HOSPITAL | Age: 60
LOS: 1 days | End: 2020-05-27
Payer: SELF-PAY

## 2020-05-27 ENCOUNTER — APPOINTMENT (OUTPATIENT)
Dept: INTERNAL MEDICINE | Facility: CLINIC | Age: 60
End: 2020-05-27

## 2020-05-27 DIAGNOSIS — Z99.2 DEPENDENCE ON RENAL DIALYSIS: Chronic | ICD-10-CM

## 2020-05-27 DIAGNOSIS — B34.9 VIRAL INFECTION, UNSPECIFIED: ICD-10-CM

## 2020-05-27 DIAGNOSIS — T85.898A OTHER SPECIFIED COMPLICATION OF OTHER INTERNAL PROSTHETIC DEVICES, IMPLANTS AND GRAFTS, INITIAL ENCOUNTER: Chronic | ICD-10-CM

## 2020-05-27 DIAGNOSIS — I77.0 ARTERIOVENOUS FISTULA, ACQUIRED: Chronic | ICD-10-CM

## 2020-05-27 PROCEDURE — G0463: CPT

## 2020-05-27 NOTE — REVIEW OF SYSTEMS
[Fever] : fever [Fatigue] : fatigue [Nausea] : nausea [Diarrhea] : diarrhea [Earache] : no earache [Vision Problems] : no vision problems [Shortness Of Breath] : no shortness of breath [Dysuria] : no dysuria [Joint Pain] : no joint pain [Chest Pain] : no chest pain [Skin Rash] : no skin rash [Headache] : no headache

## 2020-05-27 NOTE — HISTORY OF PRESENT ILLNESS
[Other Location: e.g. Home (Enter Location, City,State)___] : at [unfilled] [Home] : at home, [unfilled] , at the time of the visit. 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 \par \par Spoke with patient and daughter, Kimberly, who provided translation. \par \par Patient says that after HD session on Friday, she began to feel fatigued with body aches. Over the weekend, she developed nausea and diarrhea. Highest temperature noted was 99.8. Patient took Imodium and Tylenol, and says that she feels a lot better today. No longer with diarrhea or subjective fever. Denies chest pain, sob, cough. \par \par Patient lives with daughter and . Nobody is sick at home. Patient goes to dialysis [facility]. Told dialysis center about her symptoms on Monday. Next HD session is this afternoon. Patient gets blood work done routinely twice a month, last blood work was about 2 weeks ago.

## 2020-05-28 DIAGNOSIS — I10 ESSENTIAL (PRIMARY) HYPERTENSION: ICD-10-CM

## 2020-06-09 ENCOUNTER — APPOINTMENT (OUTPATIENT)
Dept: VASCULAR SURGERY | Facility: CLINIC | Age: 60
End: 2020-06-09
Payer: MEDICAID

## 2020-06-09 PROCEDURE — 99213 OFFICE O/P EST LOW 20 MIN: CPT

## 2020-06-09 PROCEDURE — 93990 DOPPLER FLOW TESTING: CPT

## 2020-06-09 NOTE — DISCUSSION/SUMMARY
[FreeTextEntry1] : 60 yo female with history of esrd on hd via left upper extremity basilic avf presents for follow up \par duplex shows >75% in stent stenosis at the axilla and &5% at juxta\par has poor flow\par will arrange for left upper extremity fistulogram

## 2020-06-09 NOTE — HISTORY OF PRESENT ILLNESS
[FreeTextEntry1] : 58 yo female with history of esrd on hd via left upper extremity basilic avf presents for follow up \par pt reports occasional prolonged bleeding Patient recently was in the hospital with fistula thrombosis and underwent thrombectomy there [] : left basilic fistula

## 2020-06-09 NOTE — PHYSICAL EXAM
[Thrill] : thrill [Pulsatile Thrill] : pulsatile thrill [Bleeding] : no bleeding [Aneurysm] : aneurysm [Warm Extremities] : warm extremities [Hand well perfused] : hand well perfused [Ulcer] : no ulcer [2+] : left 2+ [Normal] : coordination grossly intact, no focal deficits [de-identified] :  strength 5/5 b/l upper extremities [de-identified] : intact

## 2020-06-20 ENCOUNTER — APPOINTMENT (OUTPATIENT)
Dept: DISASTER EMERGENCY | Facility: CLINIC | Age: 60
End: 2020-06-20

## 2020-06-22 ENCOUNTER — FORM ENCOUNTER (OUTPATIENT)
Age: 60
End: 2020-06-22

## 2020-06-22 LAB — SARS-COV-2 N GENE NPH QL NAA+PROBE: NOT DETECTED

## 2020-06-23 ENCOUNTER — APPOINTMENT (OUTPATIENT)
Dept: ENDOVASCULAR SURGERY | Facility: CLINIC | Age: 60
End: 2020-06-23
Payer: MEDICAID

## 2020-06-23 VITALS
HEART RATE: 70 BPM | OXYGEN SATURATION: 99 % | SYSTOLIC BLOOD PRESSURE: 104 MMHG | RESPIRATION RATE: 15 BRPM | HEIGHT: 62 IN | DIASTOLIC BLOOD PRESSURE: 71 MMHG | TEMPERATURE: 98.1 F | BODY MASS INDEX: 23.74 KG/M2 | WEIGHT: 129 LBS

## 2020-06-23 PROCEDURE — 36215Z: CUSTOM | Mod: 59

## 2020-06-23 PROCEDURE — 36902Z: CUSTOM

## 2020-06-23 PROCEDURE — 36907Z: CUSTOM | Mod: 59

## 2020-06-23 RX ORDER — FAMOTIDINE 20 MG/1
20 TABLET, FILM COATED ORAL
Qty: 30 | Refills: 3 | Status: DISCONTINUED | COMMUNITY
Start: 2020-05-14 | End: 2020-06-23

## 2020-06-23 RX ORDER — CALCIUM ACETATE 667 MG/1
667 TABLET ORAL 3 TIMES DAILY
Qty: 540 | Refills: 0 | Status: DISCONTINUED | COMMUNITY
End: 2020-06-23

## 2020-07-10 NOTE — PROCEDURE
[Resume diet] : resume diet [Site check for bleeding/hematoma/thrill/bruit] : Site check for bleeding/hematoma/thrill/bruit [D/C IV on discharge] : D/C IV on discharge [Vital signs on admission the q 15 mins x2] : Vital signs on admission the q 15 mins x2 [FreeTextEntry1] : left arm fistula /angioplasty

## 2020-07-10 NOTE — HISTORY OF PRESENT ILLNESS
[] : left basilic fistula [FreeTextEntry4] : yesterday  [FreeTextEntry1] : Dr Dempsey  9/18/2017 [FreeTextEntry5] : yesterday at  [FreeTextEntry6] : Dr Koroma

## 2020-08-01 NOTE — PATIENT PROFILE ADULT - PATIENT REPRESENTATIVE: ( YOU CAN CHOOSE ANY PERSON THAT CAN ASSIST YOU WITH YOUR HEALTH CARE PREFERENCES, DOES NOT HAVE TO BE A SPOUSE, IMMEDIATE FAMILY OR SIGNIFICANT OTHER/PARTNER)
yes
MEDICATIONS  (STANDING):  atorvastatin 40 milliGRAM(s) Oral at bedtime  carvedilol 3.125 milliGRAM(s) Oral every 12 hours  chlorhexidine 4% Liquid 1 Application(s) Topical <User Schedule>  dextrose 5% + sodium chloride 0.45% 1000 milliLiter(s) (75 mL/Hr) IV Continuous <Continuous>  heparin   Injectable 5000 Unit(s) SubCutaneous every 8 hours  HYDROmorphone PCA (1 mG/mL) 30 milliLiter(s) PCA Continuous PCA Continuous  metoprolol tartrate Injectable 5 milliGRAM(s) IV Push every 6 hours  nicotine -  14 mG/24Hr(s) Patch 1 patch Transdermal daily    MEDICATIONS  (PRN):  naloxone Injectable 0.1 milliGRAM(s) IV Push every 3 minutes PRN For ANY of the following changes in patient status:  A. RR LESS THAN 10 breaths per minute, B. Oxygen saturation LESS THAN 90%, C. Sedation score of 6  ondansetron Injectable 4 milliGRAM(s) IV Push every 6 hours PRN Nausea

## 2020-09-10 NOTE — PATIENT PROFILE ADULT. - ABILITY TO HEAR (WITH HEARING AID OR HEARING APPLIANCE IF NORMALLY USED):
Adequate: hears normal conversation without difficulty Samples Given: Dove, neutrogena hand cream, eucerin Detail Level: Zone

## 2020-09-12 ENCOUNTER — APPOINTMENT (OUTPATIENT)
Dept: DISASTER EMERGENCY | Facility: CLINIC | Age: 60
End: 2020-09-12

## 2020-09-13 LAB — SARS-COV-2 N GENE NPH QL NAA+PROBE: NOT DETECTED

## 2020-09-17 ENCOUNTER — APPOINTMENT (OUTPATIENT)
Dept: ENDOVASCULAR SURGERY | Facility: CLINIC | Age: 60
End: 2020-09-17
Payer: MEDICAID

## 2020-09-17 VITALS
SYSTOLIC BLOOD PRESSURE: 111 MMHG | HEART RATE: 78 BPM | DIASTOLIC BLOOD PRESSURE: 75 MMHG | OXYGEN SATURATION: 99 % | TEMPERATURE: 98 F | WEIGHT: 127 LBS | BODY MASS INDEX: 23.98 KG/M2 | HEIGHT: 61 IN

## 2020-09-17 PROCEDURE — 36902Z: CUSTOM

## 2020-09-17 PROCEDURE — 36907Z: CUSTOM | Mod: 59

## 2020-09-17 NOTE — HISTORY OF PRESENT ILLNESS
[] : left basilic fistula [FreeTextEntry1] : Dr Dempsey  9/18/2017 [FreeTextEntry4] : yesterday  [FreeTextEntry5] : yesterday at 9pm [FreeTextEntry6] : Dr Koroma

## 2020-09-17 NOTE — PROCEDURE
[Site check for bleeding/hematoma/thrill/bruit] : Site check for bleeding/hematoma/thrill/bruit [Resume diet] : resume diet [Vital signs on admission the q 15 mins x2] : Vital signs on admission the q 15 mins x2 [FreeTextEntry1] : left arm fistulagram /angioplasty

## 2020-09-17 NOTE — REASON FOR VISIT
[Other ___] : a [unfilled] visit for [Family Member] : family member [Prolonged Bleeding] : prolonged bleeding [Spouse] : spouse

## 2020-09-17 NOTE — ASSESSMENT
[FreeTextEntry1] : ESRD on HD with prolonged bleeding for fistulogram.  Consent obtained.\par \par Severe stenoses in outflow and centrally and in proximal fistula PTA to 8, 10 and 5mm respectively.  Good result and no complication.  EBL=10 cc.  Full report to follow.\par \par

## 2020-09-21 ENCOUNTER — APPOINTMENT (OUTPATIENT)
Dept: VASCULAR SURGERY | Facility: CLINIC | Age: 60
End: 2020-09-21
Payer: MEDICAID

## 2020-09-21 VITALS
WEIGHT: 127 LBS | DIASTOLIC BLOOD PRESSURE: 83 MMHG | SYSTOLIC BLOOD PRESSURE: 129 MMHG | HEART RATE: 85 BPM | BODY MASS INDEX: 23.98 KG/M2 | HEIGHT: 61 IN

## 2020-09-21 VITALS — TEMPERATURE: 97.5 F

## 2020-09-21 PROCEDURE — 93990 DOPPLER FLOW TESTING: CPT

## 2020-09-21 PROCEDURE — 99213 OFFICE O/P EST LOW 20 MIN: CPT

## 2020-09-21 NOTE — PHYSICAL EXAM
[Pulsatile Thrill] : pulsatile thrill [Aneurysm] : no aneurysm [Normal] : normoactive bowel sounds, soft and nontender, no hepatosplenomegaly or masses appreciated

## 2020-09-21 NOTE — ASSESSMENT
[FreeTextEntry1] : Patient with renal failure on hemodialysis.  Left radiocephalic fistula underwent recent angioplasty of the inflow and outflow stenoses.  Patient complains of some difficulty but no significant stenosis noted on duplex.  Continue conservative management and follow-up in 2 to 3 months.

## 2020-10-08 ENCOUNTER — APPOINTMENT (OUTPATIENT)
Dept: DERMATOLOGY | Facility: HOSPITAL | Age: 60
End: 2020-10-08

## 2020-11-02 ENCOUNTER — APPOINTMENT (OUTPATIENT)
Dept: VASCULAR SURGERY | Facility: CLINIC | Age: 60
End: 2020-11-02
Payer: MEDICAID

## 2020-11-02 VITALS — TEMPERATURE: 97.3 F

## 2020-11-02 PROCEDURE — 99213 OFFICE O/P EST LOW 20 MIN: CPT

## 2020-11-02 PROCEDURE — 93990 DOPPLER FLOW TESTING: CPT

## 2020-11-02 NOTE — ASSESSMENT
[FreeTextEntry1] : Patient with renal failure on hemodialysis.  Left basilic fistula underwent recent angioplasty of the inflow and outflow stenoses.  Patient complains of some difficulty but no significant stenosis noted on duplex.  Continue conservative management and follow-up in 2 to 3 months.

## 2020-12-03 ENCOUNTER — APPOINTMENT (OUTPATIENT)
Dept: ENDOVASCULAR SURGERY | Facility: CLINIC | Age: 60
End: 2020-12-03
Payer: MEDICAID

## 2020-12-03 ENCOUNTER — RESULT REVIEW (OUTPATIENT)
Age: 60
End: 2020-12-03

## 2020-12-03 VITALS
HEART RATE: 71 BPM | BODY MASS INDEX: 21.66 KG/M2 | TEMPERATURE: 97.9 F | RESPIRATION RATE: 20 BRPM | WEIGHT: 114.64 LBS | DIASTOLIC BLOOD PRESSURE: 79 MMHG | SYSTOLIC BLOOD PRESSURE: 120 MMHG | OXYGEN SATURATION: 99 %

## 2020-12-03 PROCEDURE — 36903Z: CUSTOM

## 2020-12-03 PROCEDURE — 36907Z: CUSTOM | Mod: 59

## 2020-12-03 NOTE — HISTORY OF PRESENT ILLNESS
[] : left basilic fistula [FreeTextEntry1] : Dr Dempsey  9/18/2017 [FreeTextEntry4] : yesterday  [FreeTextEntry5] : yesterday at 9pm [FreeTextEntry6] : Dr Delatorre

## 2020-12-03 NOTE — ASSESSMENT
[FreeTextEntry1] : ESRD on HD with poor flow rates at HD and difficulty cannulating for fistulogram.  Consent obtained.\par \par Fistulogram demonstrates severe recurrent stenosis in left distal fistula and moderate to severe stenosis in left brachiocephalic vein.  Moderate focal stenosis in juxtaanastamotic region.  PTA to 8 mm with high pressure balloon demonstrated PSA and extravasation so covered 8mm x 5 cm Viabahn placed with resolution and improved caliberl.  Central veins PTA to 10 mm with good result.  Juxtaanastamotic region PTA to 6 mm with good result and improved thrill.  Full report to follow.  EBL=minimal.\par \par \par \par

## 2020-12-03 NOTE — PAST MEDICAL HISTORY
[Increasing age ( >40 years old)] : Increasing age ( >40 years old) [No therapy indicated for cases scheduled for less than one hour] : No therapy indicated for cases scheduled for less than one hour. [FreeTextEntry1] : Malignant Hyperthermia Screening Tool and Risk of Bleeding Assessment \par Ms. MIHYEON HU denies family of unexpected death following Anesthesia or Exercise.\par Denies Family history of Malignant Hyperthermia, Muscle or Neuromuscular disorder and High Temperature  following exercise.\par \par Ms. MIHYEON HU denies history of Muscle Spasm, Dark or Chocolate- Colored and Unanticipated fever immediately following anaesthesia or serious exercise.\par Ms. MIHYEON HU also denies bleeding tendencies/Risks of Bleeding.\par

## 2020-12-03 NOTE — REASON FOR VISIT
[Other ___] : a [unfilled] visit for [Clotted Access] : clotted access [Spouse] : spouse [Family Member] : family member [FreeTextEntry2] : referral

## 2020-12-03 NOTE — PROCEDURE
[Resume diet] : resume diet [Site check for bleeding/hematoma/thrill/bruit] : Site check for bleeding/hematoma/thrill/bruit [Vital signs on admission the q 15 mins x2] : Vital signs on admission the q 15 mins x2 [FreeTextEntry1] : thrombectomy of left arm fistula/ [FreeTextEntry3] : TPA 2mg in 3ml sterile water placed thru a 10cm speed lyser by Dr Frederick @

## 2020-12-18 ENCOUNTER — APPOINTMENT (OUTPATIENT)
Dept: INTERNAL MEDICINE | Facility: CLINIC | Age: 60
End: 2020-12-18

## 2020-12-18 ENCOUNTER — OUTPATIENT (OUTPATIENT)
Dept: OUTPATIENT SERVICES | Facility: HOSPITAL | Age: 60
LOS: 1 days | End: 2020-12-18
Payer: SELF-PAY

## 2020-12-18 VITALS
WEIGHT: 124 LBS | HEIGHT: 61 IN | OXYGEN SATURATION: 97 % | BODY MASS INDEX: 23.41 KG/M2 | HEART RATE: 85 BPM | DIASTOLIC BLOOD PRESSURE: 70 MMHG | SYSTOLIC BLOOD PRESSURE: 110 MMHG

## 2020-12-18 DIAGNOSIS — I77.0 ARTERIOVENOUS FISTULA, ACQUIRED: Chronic | ICD-10-CM

## 2020-12-18 DIAGNOSIS — Z99.2 DEPENDENCE ON RENAL DIALYSIS: Chronic | ICD-10-CM

## 2020-12-18 DIAGNOSIS — T85.898A OTHER SPECIFIED COMPLICATION OF OTHER INTERNAL PROSTHETIC DEVICES, IMPLANTS AND GRAFTS, INITIAL ENCOUNTER: Chronic | ICD-10-CM

## 2020-12-18 DIAGNOSIS — I10 ESSENTIAL (PRIMARY) HYPERTENSION: ICD-10-CM

## 2020-12-18 PROCEDURE — G0463: CPT

## 2020-12-21 ENCOUNTER — APPOINTMENT (OUTPATIENT)
Dept: VASCULAR SURGERY | Facility: CLINIC | Age: 60
End: 2020-12-21

## 2020-12-24 DIAGNOSIS — R07.9 CHEST PAIN, UNSPECIFIED: ICD-10-CM

## 2020-12-24 DIAGNOSIS — K21.9 GASTRO-ESOPHAGEAL REFLUX DISEASE WITHOUT ESOPHAGITIS: ICD-10-CM

## 2020-12-24 NOTE — REVIEW OF SYSTEMS
[Chest Pain] : chest pain [Nausea] : nausea [Heartburn] : heartburn [Muscle Pain] : muscle pain [Fever] : no fever [Chills] : no chills [Night Sweats] : no night sweats [Palpitations] : no palpitations [Shortness Of Breath] : no shortness of breath [Wheezing] : no wheezing [Cough] : no cough [Abdominal Pain] : no abdominal pain [Constipation] : no constipation [Diarrhea] : diarrhea [Vomiting] : no vomiting [Muscle Weakness] : no muscle weakness

## 2020-12-24 NOTE — ASSESSMENT
[FreeTextEntry1] :  60 woman with a history of Polycythemia Vera, MAK 2 + since 2012. Patient with history of splenic vein thrombosis (2015), ESRD on HD (3x weekly) via LUE AVF (2/2 chronic GN, started Dec 2017), HTN. Patient comes to clinic today with the following concerns.\par \par #Chest pain. \par -Patient reports chest pain for past 3-4 mo sharp chest pain lasting a few seconds occurring a variable number of times (sometimes 3-4x daily, 3-4 a week, skips a week). \par -Occurs with lifting objects although not reproducible to palpation. \par -Does not happen with walking/cardiac exertion. No diaphoresis, SOB, jaw pain, loss of sensation/pain in left arm.\par \par #Reflux. \par -10 year history of reflux states her nephrologist recently asked her to discontinue her PPI. Patient would like to know when she can resume her PPI. \par \par #Hepatitis B. \par -Has received 4 hepatitis shots without defined antibodies on prior lab draws. Asking if repeat immunization is necessary.

## 2020-12-24 NOTE — PHYSICAL EXAM
[No Acute Distress] : no acute distress [Well Nourished] : well nourished [Well Developed] : well developed [Well-Appearing] : well-appearing [Normal Oropharynx] : the oropharynx was normal [No Respiratory Distress] : no respiratory distress  [No Accessory Muscle Use] : no accessory muscle use [Clear to Auscultation] : lungs were clear to auscultation bilaterally [Normal Rate] : normal rate  [Regular Rhythm] : with a regular rhythm [Normal S1, S2] : normal S1 and S2 [No Murmur] : no murmur heard [Soft] : abdomen soft [Non Tender] : non-tender [Non-distended] : non-distended [No HSM] : no HSM [Normal Bowel Sounds] : normal bowel sounds [No CVA Tenderness] : no CVA  tenderness [No Spinal Tenderness] : no spinal tenderness [Coordination Grossly Intact] : coordination grossly intact [No Focal Deficits] : no focal deficits [Normal Gait] : normal gait [Normal Affect] : the affect was normal [Normal Insight/Judgement] : insight and judgment were intact [de-identified] : Fistula with audible bruit

## 2020-12-24 NOTE — PLAN
[FreeTextEntry1] : #Chest pain. \par -No further intervention at this time likely musculoskeletal.\par \par #Reflux. \par -pantoprazole 40mg qd 30 day trial\par \par #Hepatitis B. \par -No repeat hepatitis shots at this time. She may be a nonresponder. \par \par Please follow up in 4 weeks.

## 2020-12-24 NOTE — HISTORY OF PRESENT ILLNESS
[FreeTextEntry8] :  60 woman with a history of Polycythemia Vera, MAK 2 + since 2012. Patient with history of splenic vein thrombosis (2015), ESRD on HD (3x weekly) via LUE AVF (2/2 chronic GN, started Dec 2017), HTN. Patient comes to clinic today with the following concerns.\par \par #Chest pain. Patient reports chest pain for past 3-4 mo sharp chest pain lasting a few seconds occurring a variable number of times (sometimes 3-4x daily, 3-4 a week, skips a week). Occurs with lifting objects although not reproducible to palpation. Does not happen with walking/cardiac exertion. No diaphoresis, SOB, jaw pain, loss of sensation/pain in left arm.\par \par #Reflux. 10 year history of reflux states her nephrologist recently asked her to discontinue her PPI. Patient would like to know when she can resume her PPI. \par \par #Hepatitis B. Has received 4 hepatitis shots without defined antibodies on prior lab draws. Asking if repeat immunization is necessary.\par \par Patient received a flu shot 11/2020.
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
## 2021-01-04 ENCOUNTER — APPOINTMENT (OUTPATIENT)
Dept: VASCULAR SURGERY | Facility: CLINIC | Age: 61
End: 2021-01-04

## 2021-01-05 ENCOUNTER — INPATIENT (INPATIENT)
Facility: HOSPITAL | Age: 61
LOS: 7 days | Discharge: HOME | DRG: 840 | End: 2021-01-13
Attending: INTERNAL MEDICINE | Admitting: HOSPITALIST
Payer: MEDICAID

## 2021-01-05 VITALS
RESPIRATION RATE: 20 BRPM | DIASTOLIC BLOOD PRESSURE: 85 MMHG | HEIGHT: 60.2 IN | OXYGEN SATURATION: 98 % | HEART RATE: 93 BPM | WEIGHT: 118.61 LBS | TEMPERATURE: 98 F | SYSTOLIC BLOOD PRESSURE: 124 MMHG

## 2021-01-05 DIAGNOSIS — Z99.2 DEPENDENCE ON RENAL DIALYSIS: Chronic | ICD-10-CM

## 2021-01-05 DIAGNOSIS — I77.0 ARTERIOVENOUS FISTULA, ACQUIRED: Chronic | ICD-10-CM

## 2021-01-05 DIAGNOSIS — T85.898A OTHER SPECIFIED COMPLICATION OF OTHER INTERNAL PROSTHETIC DEVICES, IMPLANTS AND GRAFTS, INITIAL ENCOUNTER: Chronic | ICD-10-CM

## 2021-01-05 LAB
ALBUMIN SERPL ELPH-MCNC: 4.5 G/DL — SIGNIFICANT CHANGE UP (ref 3.3–5)
ALP SERPL-CCNC: 132 U/L — HIGH (ref 40–120)
ALT FLD-CCNC: 12 U/L — SIGNIFICANT CHANGE UP (ref 10–45)
ANION GAP SERPL CALC-SCNC: 18 MMOL/L — HIGH (ref 5–17)
ANISOCYTOSIS BLD QL: SIGNIFICANT CHANGE UP
APTT BLD: 36.8 SEC — HIGH (ref 27.5–35.5)
AST SERPL-CCNC: 33 U/L — SIGNIFICANT CHANGE UP (ref 10–40)
BASOPHILS # BLD AUTO: 0.38 K/UL — HIGH (ref 0–0.2)
BASOPHILS NFR BLD AUTO: 2.6 % — HIGH (ref 0–2)
BILIRUB SERPL-MCNC: 4.5 MG/DL — HIGH (ref 0.2–1.2)
BUN SERPL-MCNC: 28 MG/DL — HIGH (ref 7–23)
CALCIUM SERPL-MCNC: 9 MG/DL — SIGNIFICANT CHANGE UP (ref 8.4–10.5)
CHLORIDE SERPL-SCNC: 93 MMOL/L — LOW (ref 96–108)
CO2 SERPL-SCNC: 25 MMOL/L — SIGNIFICANT CHANGE UP (ref 22–31)
CREAT SERPL-MCNC: 5.07 MG/DL — HIGH (ref 0.5–1.3)
DACRYOCYTES BLD QL SMEAR: SLIGHT — SIGNIFICANT CHANGE UP
ELLIPTOCYTES BLD QL SMEAR: SLIGHT — SIGNIFICANT CHANGE UP
EOSINOPHIL # BLD AUTO: 0.25 K/UL — SIGNIFICANT CHANGE UP (ref 0–0.5)
EOSINOPHIL NFR BLD AUTO: 1.7 % — SIGNIFICANT CHANGE UP (ref 0–6)
GLUCOSE SERPL-MCNC: 98 MG/DL — SIGNIFICANT CHANGE UP (ref 70–99)
HCT VFR BLD CALC: 33.8 % — LOW (ref 34.5–45)
HGB BLD-MCNC: 10.1 G/DL — LOW (ref 11.5–15.5)
HYPOCHROMIA BLD QL: SLIGHT — SIGNIFICANT CHANGE UP
INR BLD: 1.21 RATIO — HIGH (ref 0.88–1.16)
LYMPHOCYTES # BLD AUTO: 0.77 K/UL — LOW (ref 1–3.3)
LYMPHOCYTES # BLD AUTO: 5.3 % — LOW (ref 13–44)
MACROCYTES BLD QL: SLIGHT — SIGNIFICANT CHANGE UP
MANUAL SMEAR VERIFICATION: SIGNIFICANT CHANGE UP
MCHC RBC-ENTMCNC: 28.6 PG — SIGNIFICANT CHANGE UP (ref 27–34)
MCHC RBC-ENTMCNC: 29.9 GM/DL — LOW (ref 32–36)
MCV RBC AUTO: 95.8 FL — SIGNIFICANT CHANGE UP (ref 80–100)
METAMYELOCYTES # FLD: 1.8 % — HIGH (ref 0–0)
MONOCYTES # BLD AUTO: 0.51 K/UL — SIGNIFICANT CHANGE UP (ref 0–0.9)
MONOCYTES NFR BLD AUTO: 3.5 % — SIGNIFICANT CHANGE UP (ref 2–14)
NEUTROPHILS # BLD AUTO: 12.2 K/UL — HIGH (ref 1.8–7.4)
NEUTROPHILS NFR BLD AUTO: 80.7 % — HIGH (ref 43–77)
NEUTS BAND # BLD: 3.5 % — SIGNIFICANT CHANGE UP (ref 0–8)
NRBC # BLD: 1 /100 — HIGH (ref 0–0)
OVALOCYTES BLD QL SMEAR: SLIGHT — SIGNIFICANT CHANGE UP
PLAT MORPH BLD: ABNORMAL
PLATELET # BLD AUTO: 220 K/UL — SIGNIFICANT CHANGE UP (ref 150–400)
POIKILOCYTOSIS BLD QL AUTO: SLIGHT — SIGNIFICANT CHANGE UP
POLYCHROMASIA BLD QL SMEAR: SLIGHT — SIGNIFICANT CHANGE UP
POTASSIUM SERPL-MCNC: 5.1 MMOL/L — SIGNIFICANT CHANGE UP (ref 3.5–5.3)
POTASSIUM SERPL-SCNC: 5.1 MMOL/L — SIGNIFICANT CHANGE UP (ref 3.5–5.3)
PROT SERPL-MCNC: 8 G/DL — SIGNIFICANT CHANGE UP (ref 6–8.3)
PROTHROM AB SERPL-ACNC: 14.4 SEC — HIGH (ref 10.6–13.6)
RBC # BLD: 3.53 M/UL — LOW (ref 3.8–5.2)
RBC # FLD: 21.2 % — HIGH (ref 10.3–14.5)
RBC BLD AUTO: ABNORMAL
SARS-COV-2 RNA SPEC QL NAA+PROBE: SIGNIFICANT CHANGE UP
SODIUM SERPL-SCNC: 136 MMOL/L — SIGNIFICANT CHANGE UP (ref 135–145)
STOMATOCYTES BLD QL SMEAR: SLIGHT — SIGNIFICANT CHANGE UP
TROPONIN T, HIGH SENSITIVITY RESULT: 14 NG/L — SIGNIFICANT CHANGE UP (ref 0–51)
TROPONIN T, HIGH SENSITIVITY RESULT: 14 NG/L — SIGNIFICANT CHANGE UP (ref 0–51)
VARIANT LYMPHS # BLD: 0.9 % — SIGNIFICANT CHANGE UP (ref 0–6)
WBC # BLD: 14.49 K/UL — HIGH (ref 3.8–10.5)
WBC # FLD AUTO: 14.49 K/UL — HIGH (ref 3.8–10.5)

## 2021-01-05 PROCEDURE — 71045 X-RAY EXAM CHEST 1 VIEW: CPT | Mod: 26

## 2021-01-05 PROCEDURE — 93010 ELECTROCARDIOGRAM REPORT: CPT | Mod: 59

## 2021-01-05 PROCEDURE — 74177 CT ABD & PELVIS W/CONTRAST: CPT | Mod: 26

## 2021-01-05 PROCEDURE — 99285 EMERGENCY DEPT VISIT HI MDM: CPT | Mod: 25

## 2021-01-05 RX ORDER — MORPHINE SULFATE 50 MG/1
4 CAPSULE, EXTENDED RELEASE ORAL ONCE
Refills: 0 | Status: DISCONTINUED | OUTPATIENT
Start: 2021-01-05 | End: 2021-01-05

## 2021-01-05 RX ORDER — HYDROMORPHONE HYDROCHLORIDE 2 MG/ML
1 INJECTION INTRAMUSCULAR; INTRAVENOUS; SUBCUTANEOUS ONCE
Refills: 0 | Status: DISCONTINUED | OUTPATIENT
Start: 2021-01-05 | End: 2021-01-05

## 2021-01-05 RX ADMIN — HYDROMORPHONE HYDROCHLORIDE 1 MILLIGRAM(S): 2 INJECTION INTRAMUSCULAR; INTRAVENOUS; SUBCUTANEOUS at 23:57

## 2021-01-05 RX ADMIN — MORPHINE SULFATE 4 MILLIGRAM(S): 50 CAPSULE, EXTENDED RELEASE ORAL at 19:58

## 2021-01-05 RX ADMIN — MORPHINE SULFATE 4 MILLIGRAM(S): 50 CAPSULE, EXTENDED RELEASE ORAL at 20:30

## 2021-01-05 NOTE — ED ADULT NURSE NOTE - OBJECTIVE STATEMENT
60 year old A&Ox3 female presents to ED in wheelchair complaining of left sided CP and SOB x 1 day. PMH pancreatic cysts, cirrhosis, ESRD on HD, MWF, HTN, splenomegaly, peptic ulcer, polycythemia vera. Confirms chest pain and SOB, both worse with exertion. Lungs CTAB, speaking full sentences 97% on room air. Abdomen tender to LLQ, confirms nausea, with decreased PO intake. CM applied, EKG done. Denies  v/d, fevers, chills,  urinary symptoms, numbness, tingling in upper and lower extremities, HA, blurry vision. VSS updated on plan of care.

## 2021-01-05 NOTE — ED PROVIDER NOTE - CARE PLAN
Principal Discharge DX:	Epigastric pain  Secondary Diagnosis:	Cirrhosis of liver without ascites, unspecified hepatic cirrhosis type  Secondary Diagnosis:	Splenomegaly

## 2021-01-05 NOTE — ED PROCEDURE NOTE - ATTENDING CONTRIBUTION TO CARE
Attending MD Leatha Dodson: Risks, benefit and alternatives of procedure explained to patient and patient demonstrated verbal understanding and consent.  I was present during the key portions of the procedure. Patient tolerated procedure well without complications

## 2021-01-05 NOTE — ED PROVIDER NOTE - PHYSICAL EXAMINATION
Gen: Patient is uncomfortable appearing, AAOx3.   HEENT: NCAT, EOMI, CASEY, normal conjunctiva, tongue midline, oral mucosa moist.  Lung: CTAB, no respiratory distress, no WRR, speaking in full sentences.   CV: RRR, no mrg, distal pulses 2+ b/l.  Abd: TTP diffusely across the upper abdomen with guarding. No CVA tenderness.   MSK: no visible deformities, ROM normal in UE/LE.   Neuro: No focal sensory or motor deficits.  Skin: Warm, well perfused, no rash, no leg swelling.  Psych: normal affect, calm. Gen: Patient is uncomfortable appearing, AAOx3.   HEENT: NCAT, EOMI, CASEY, normal conjunctiva, tongue midline, oral mucosa moist.  Lung: CTAB, no respiratory distress, no WRR, speaking in full sentences.   CV: RRR, no mrg, distal pulses 2+ b/l.  Abd: TTP diffusely across the upper abdomen with guarding. No CVA tenderness. +Splenomegaly.  MSK: no visible deformities, ROM normal in UE/LE.   Neuro: No focal sensory or motor deficits.  Skin: Warm, well perfused, no rash, no leg swelling.  Psych: normal affect, calm.

## 2021-01-05 NOTE — ED ADULT NURSE NOTE - FINAL NURSING ELECTRONIC SIGNATURE
[FreeTextEntry1] : rx nystatin powder. \par A1c 8.7 improving. HUMPHREY states she has been reducing the amount of sweets she is eating. HUMPHREY will rto in 3 months. \par HUMPHREY will f/u Optho, Podo and GI.  06-Jan-2021 03:43

## 2021-01-05 NOTE — ED PROVIDER NOTE - RAPID ASSESSMENT
60y F with hx of HTN, Pancreatic cysts, cirrhosis of liver on dialysis, presents to the ED c/o CP. Last dialysis was 1 day ago. Pt endorses some nausea, dizziness and SOB when walking. Pain is worse with ambulation. Denies f/c, covid contacts, sob, vomiting.    IFabi (Adonay), have documented this rapid assessment note under the dictation of Dr. Baltazar which has been reviewed and affirmed to be accurate.  Patient was seen as a QDOC patient. The patient will be seen and further worked up in the main emergency department and their care will be completed by the main emergency department team along with a thorough physical exam. Receiving team will follow up on labs, analgesia, any clinical imaging, reassess and disposition as clinically indicated, all decisions regarding the progression of care will be made at their discretion.

## 2021-01-05 NOTE — ED PROVIDER NOTE - OBJECTIVE STATEMENT
61 yo F with hx of HTN, pancreatic cysts, ESRD on dialysis, polycythemia, p/w upper abdominal pain x 1 day. Patient reports gradual onset upper abdominal pain worse in LUQ worsening with deep breath a/w nausea, no vomiting. Denies fevers, chills, cough. 59 yo F with hx of HTN, pancreatic cysts, ESRD on dialysis, polycythemia, p/w upper abdominal pain x 1 day. Patient reports gradual onset upper abdominal pain worse in LUQ worsening with deep breath a/w nausea, no vomiting. Denies fevers, chills, cough. Reports pain with breathing, but no specific SOB. No chest pain. Notes prior issue with her pancreas, needed stents, told she needed surgery, but has not had yet

## 2021-01-05 NOTE — ED PROVIDER NOTE - CLINICAL SUMMARY MEDICAL DECISION MAKING FREE TEXT BOX
59 yo F with hx of HTN, pancreatic cysts, ESRD on dialysis, polycythemia p/w upper abdominal pain and nausea. Patient is uncomfortable appearing, VSS, with epigastric, RUQ and LUQ ttp and guarding. Concerning for gallstones vs pancreatitis. Scheduled for dialysis tomorrow. Plan for labs, lipase, CTAP, and dispo pending results. 61 yo F with hx of HTN, pancreatic cysts, ESRD on dialysis, polycythemia p/w upper abdominal pain and nausea. Patient is uncomfortable appearing, VSS, with epigastric, RUQ and LUQ ttp and guarding. Concerning for gallstones vs pancreatitis. Scheduled for dialysis tomorrow. Plan for labs, lipase, CTAP, and dispo pending results.    Leatha Dodson MD - Attending Physician: Pt here with epigastric pain, afebrile, prior known liver ds. Very uncomfortable, epigastric tenderness. ?Pancreatitis vs GB pathology vs other. Labs, CT pending for eval

## 2021-01-05 NOTE — ED ADULT NURSE NOTE - NSIMPLEMENTINTERV_GEN_ALL_ED
Implemented All Fall Risk Interventions:  [place] to call system. Call bell, personal items and telephone within reach. Instruct patient to call for assistance. Room bathroom lighting operational. Non-slip footwear when patient is off stretcher. Physically safe environment: no spills, clutter or unnecessary equipment. Stretcher in lowest position, wheels locked, appropriate side rails in place. Provide visual cue, wrist band, yellow gown, etc. Monitor gait and stability. Monitor for mental status changes and reorient to person, place, and time. Review medications for side effects contributing to fall risk. Reinforce activity limits and safety measures with patient and family.

## 2021-01-06 DIAGNOSIS — Z29.9 ENCOUNTER FOR PROPHYLACTIC MEASURES, UNSPECIFIED: ICD-10-CM

## 2021-01-06 DIAGNOSIS — N18.9 CHRONIC KIDNEY DISEASE, UNSPECIFIED: ICD-10-CM

## 2021-01-06 DIAGNOSIS — D45 POLYCYTHEMIA VERA: ICD-10-CM

## 2021-01-06 DIAGNOSIS — R16.1 SPLENOMEGALY, NOT ELSEWHERE CLASSIFIED: ICD-10-CM

## 2021-01-06 DIAGNOSIS — I10 ESSENTIAL (PRIMARY) HYPERTENSION: ICD-10-CM

## 2021-01-06 DIAGNOSIS — R10.13 EPIGASTRIC PAIN: ICD-10-CM

## 2021-01-06 DIAGNOSIS — N18.6 END STAGE RENAL DISEASE: ICD-10-CM

## 2021-01-06 DIAGNOSIS — N25.0 RENAL OSTEODYSTROPHY: ICD-10-CM

## 2021-01-06 DIAGNOSIS — Z02.9 ENCOUNTER FOR ADMINISTRATIVE EXAMINATIONS, UNSPECIFIED: ICD-10-CM

## 2021-01-06 DIAGNOSIS — K27.9 PEPTIC ULCER, SITE UNSPECIFIED, UNSPECIFIED AS ACUTE OR CHRONIC, WITHOUT HEMORRHAGE OR PERFORATION: ICD-10-CM

## 2021-01-06 LAB
EBV EA AB SER IA-ACNC: 135 U/ML — HIGH
EBV EA AB TITR SER IF: POSITIVE
EBV EA IGG SER-ACNC: POSITIVE
EBV NA IGG SER IA-ACNC: 52.1 U/ML — HIGH
EBV PATRN SPEC IB-IMP: SIGNIFICANT CHANGE UP
EBV VCA IGG AVIDITY SER QL IA: POSITIVE
EBV VCA IGM SER IA-ACNC: <10 U/ML — SIGNIFICANT CHANGE UP
EBV VCA IGM SER IA-ACNC: >750 U/ML — HIGH
EBV VCA IGM TITR FLD: NEGATIVE — SIGNIFICANT CHANGE UP
HAPTOGLOB SERPL-MCNC: 22 MG/DL — LOW (ref 34–200)
LDH SERPL L TO P-CCNC: 381 U/L — HIGH (ref 50–242)
RBC # BLD: 3.3 M/UL — LOW (ref 3.8–5.2)
RETICS #: 139.6 K/UL — HIGH (ref 25–125)
RETICS/RBC NFR: 4.2 % — HIGH (ref 0.5–2.5)

## 2021-01-06 PROCEDURE — 99223 1ST HOSP IP/OBS HIGH 75: CPT | Mod: GC

## 2021-01-06 PROCEDURE — 99255 IP/OBS CONSLTJ NEW/EST HI 80: CPT | Mod: GC

## 2021-01-06 PROCEDURE — 12345: CPT | Mod: NC,GC

## 2021-01-06 PROCEDURE — 99223 1ST HOSP IP/OBS HIGH 75: CPT

## 2021-01-06 PROCEDURE — 93975 VASCULAR STUDY: CPT | Mod: 26

## 2021-01-06 RX ORDER — ERYTHROPOIETIN 10000 [IU]/ML
10000 INJECTION, SOLUTION INTRAVENOUS; SUBCUTANEOUS
Refills: 0 | Status: DISCONTINUED | OUTPATIENT
Start: 2021-01-06 | End: 2021-01-13

## 2021-01-06 RX ORDER — HEPARIN SODIUM 5000 [USP'U]/ML
5000 INJECTION INTRAVENOUS; SUBCUTANEOUS EVERY 12 HOURS
Refills: 0 | Status: DISCONTINUED | OUTPATIENT
Start: 2021-01-06 | End: 2021-01-10

## 2021-01-06 RX ORDER — HYDROMORPHONE HYDROCHLORIDE 2 MG/ML
1 INJECTION INTRAMUSCULAR; INTRAVENOUS; SUBCUTANEOUS EVERY 4 HOURS
Refills: 0 | Status: DISCONTINUED | OUTPATIENT
Start: 2021-01-06 | End: 2021-01-06

## 2021-01-06 RX ORDER — PANTOPRAZOLE SODIUM 20 MG/1
40 TABLET, DELAYED RELEASE ORAL
Refills: 0 | Status: DISCONTINUED | OUTPATIENT
Start: 2021-01-06 | End: 2021-01-13

## 2021-01-06 RX ORDER — ACETAMINOPHEN 500 MG
650 TABLET ORAL EVERY 6 HOURS
Refills: 0 | Status: DISCONTINUED | OUTPATIENT
Start: 2021-01-06 | End: 2021-01-13

## 2021-01-06 RX ORDER — CALCIUM ACETATE 667 MG
1334 TABLET ORAL
Refills: 0 | Status: DISCONTINUED | OUTPATIENT
Start: 2021-01-06 | End: 2021-01-13

## 2021-01-06 RX ORDER — HYDROMORPHONE HYDROCHLORIDE 2 MG/ML
4 INJECTION INTRAMUSCULAR; INTRAVENOUS; SUBCUTANEOUS EVERY 4 HOURS
Refills: 0 | Status: DISCONTINUED | OUTPATIENT
Start: 2021-01-06 | End: 2021-01-07

## 2021-01-06 RX ADMIN — HYDROMORPHONE HYDROCHLORIDE 1 MILLIGRAM(S): 2 INJECTION INTRAMUSCULAR; INTRAVENOUS; SUBCUTANEOUS at 13:00

## 2021-01-06 RX ADMIN — HYDROMORPHONE HYDROCHLORIDE 1 MILLIGRAM(S): 2 INJECTION INTRAMUSCULAR; INTRAVENOUS; SUBCUTANEOUS at 05:53

## 2021-01-06 RX ADMIN — ERYTHROPOIETIN 10000 UNIT(S): 10000 INJECTION, SOLUTION INTRAVENOUS; SUBCUTANEOUS at 15:20

## 2021-01-06 RX ADMIN — PANTOPRAZOLE SODIUM 40 MILLIGRAM(S): 20 TABLET, DELAYED RELEASE ORAL at 05:53

## 2021-01-06 RX ADMIN — HYDROMORPHONE HYDROCHLORIDE 1 MILLIGRAM(S): 2 INJECTION INTRAMUSCULAR; INTRAVENOUS; SUBCUTANEOUS at 06:45

## 2021-01-06 RX ADMIN — HEPARIN SODIUM 5000 UNIT(S): 5000 INJECTION INTRAVENOUS; SUBCUTANEOUS at 18:09

## 2021-01-06 NOTE — PROGRESS NOTE ADULT - SUBJECTIVE AND OBJECTIVE BOX
PROGRESS NOTE:     CONTACT INFO:  Lachelle Bautista MD  Internal Medicine PGY2  Pager: [phone]    Patient is a 60y old  Female who presents with a chief complaint of Abdominal pain (06 Jan 2021 10:58)      SUBJECTIVE / OVERNIGHT EVENTS:  Patient admitted overnight. Assessed patient this morning, denies chest pain/shortness of breath, has mild epigastric pain on the left side.     ADDITIONAL REVIEW OF SYSTEMS:    MEDICATIONS  (STANDING):  calcium acetate 1334 milliGRAM(s) Oral three times a day with meals  epoetin filiberto-epbx (RETACRIT) Injectable 19040 Unit(s) IV Push <User Schedule>  heparin   Injectable 5000 Unit(s) SubCutaneous every 12 hours  pantoprazole    Tablet 40 milliGRAM(s) Oral before breakfast    MEDICATIONS  (PRN):  HYDROmorphone  Injectable 1 milliGRAM(s) IV Push every 4 hours PRN Severe Pain (7 - 10)      CAPILLARY BLOOD GLUCOSE        I&O's Summary      PHYSICAL EXAM:  Vital Signs Last 24 Hrs  T(C): 36.5 (06 Jan 2021 13:55), Max: 37.1 (05 Jan 2021 19:45)  T(F): 97.7 (06 Jan 2021 13:55), Max: 98.7 (05 Jan 2021 19:45)  HR: 85 (06 Jan 2021 13:55) (83 - 97)  BP: 138/87 (06 Jan 2021 13:55) (124/72 - 144/82)  BP(mean): 99 (05 Jan 2021 23:50) (87 - 99)  RR: 18 (06 Jan 2021 13:55) (18 - 25)  SpO2: 96% (06 Jan 2021 13:55) (95% - 100%)    GENERAL: NAD  HEAD:  Atraumatic, Normocephalic  HEENT: scleral anicteric.   CV: Regular rate and rhythm. No murmurs, rubs, or gallops  Respiratory: normal respiratory effort, speaking in complete sentences. Lungs clear to auscultation bilaterally, no wheezes/crackles.  ABDOMEN: Soft, nondistended, diffusely tender to palpation (left worse than right), normal active bowel sounds. Palpable spleen  EXTREMITIES: No lower extremity edema. Bilateral LE symmetric in size.   PSYCH: AAOx3.   NEURO: Symmetric facial expressions.   Skin: No rashes    LABS:                        10.1   14.49 )-----------( 220      ( 05 Jan 2021 16:55 )             33.8     01-05    136  |  93<L>  |  28<H>  ----------------------------<  98  5.1   |  25  |  5.07<H>    Ca    9.0      05 Jan 2021 16:55    TPro  8.0  /  Alb  4.5  /  TBili  4.5<H>  /  DBili  x   /  AST  33  /  ALT  12  /  AlkPhos  132<H>  01-05    PT/INR - ( 05 Jan 2021 19:59 )   PT: 14.4 sec;   INR: 1.21 ratio         PTT - ( 05 Jan 2021 19:59 )  PTT:36.8 sec    RADIOLOGY & ADDITIONAL TESTS:  < from: CT Abdomen and Pelvis w/ IV Cont (01.05.21 @ 20:59) >  FINDINGS:    LOWER CHEST: Bibasilar groundglass opacities and interstitial thickening may reflect alveolar and interstitial pulmonary edema. Heart is mildly enlarged. No pleural effusion.    LIVER: Within normal limits.  BILE DUCTS: Normal caliber.  GALLBLADDER: Within normal limits.  SPLEEN: Markedly enlarged spleen measuring up to 21 cm, increased since prior.  PANCREAS: Within normal limits.  ADRENALS: Within normal limits.    KIDNEYS/URETERS: Bilaterally atrophic kidneys. Bilateral renal hypodensities too small to characterize. No hydronephrosis.  BLADDER: Within normal limits.  REPRODUCTIVE ORGANS: Uterus and adnexa are unremarkable.    BOWEL: No bowel obstruction. Appendix is normal.  PERITONEUM: No free air.  VESSELS: Extensive upper abdominal varices, notably in the perigastric and splenic gastric region.  RETROPERITONEUM/LYMPH NODES: No lymphadenopathy.  ABDOMINAL WALL: Tiny fat-containing umbilical hernia.  BONES: Mild degenerative changes of the spine.    IMPRESSION:    Marked splenomegaly, increased since prior CT of 3/5/2019.  Extensive upper abdominal varices, notably in the perigastric and perisplenic region.  Bibasilar interstitial and alveolar edema.  < end of copied text >      COORDINATION OF CARE:  Care Discussed with Consultants/Other Providers [Y]: Hematology   Prior or Outpatient Records Reviewed [Y/N]:

## 2021-01-06 NOTE — CONSULT NOTE ADULT - ASSESSMENT
60F PMHx ESRD on HD (MWF at [facility], Dr De León), cirrhosis, JAK2+ PCV with splenomegaly, pancreatic cysts and HTN - admitted for epigastric pain found to have marked increase splenomegaly.        # ESRD   Pt. with ESRD on HD three times a week (MWF @ [facility], Dr. De León). Last HD was on 1/4/21 via . Pt. clinically stable. Labs reviewed.   - plan for maintenance HD today UF goal 2L as BP tolerate  - monitor BMP, fluid restriction 1.2 liters per day, renally dose medications per HD, renal diet    # Hypertension  BP in acceptable range. Monitor BP on current BP medications. Low salt diet advised    # Anemia   Pt. with anemia in the setting of ESRD Hgb level at acceptable target.   - monitor Hgb    # Renal bone disease  Pt. with hyperphosphatemia in the setting of ESRD  - continue calcium acetate 1334 mg TID with meals. Low phosphorus diet advised. Monitor serum phosphorus 60F PMHx ESRD on HD (MWF at [facility], Dr De León), cirrhosis, JAK2+ PCV with splenomegaly, pancreatic cysts and HTN - admitted for epigastric pain found to have marked increase splenomegaly.        # ESRD   Pt. with ESRD on HD three times a week (MWF @ [facility], Dr. De León). Last HD was on 1/4/21 via . Pt. clinically stable. Labs reviewed.   - plan for maintenance HD today UF goal 1.5L as BP tolerate  - monitor BMP, fluid restriction 1.2 liters per day, renally dose medications per HD, renal diet    # Hypertension  BP in acceptable range. Monitor BP on current BP medications. Low salt diet advised    # Anemia   Pt. with anemia in the setting of ESRD Hgb level at acceptable target.   - monitor Hgb    # Renal bone disease  Pt. with hyperphosphatemia in the setting of ESRD  - continue calcium acetate 1334 mg TID with meals. Low phosphorus diet advised. Monitor serum phosphorus 60F PMHx ESRD on HD (MWF at [facility], Dr De León), cirrhosis, JAK2+ PCV with splenomegaly, pancreatic cysts and HTN - admitted for epigastric pain found to have marked increase splenomegaly.        # ESRD   Pt. with ESRD on HD three times a week (MW @ [facility], Dr. De León). Last HD was on 1/4/21 via . Pt. clinically stable. Labs reviewed.   - plan for maintenance HD today UF goal 1.5L as BP tolerate  - monitor BMP, fluid restriction 1.2 liters per day, renally dose medications per HD, renal diet    # Hypertension  BP in acceptable range. Monitor BP on current BP medications. Low salt diet advised    # Anemia   Pt. with anemia in the setting of ESRD Hgb level at acceptable target.   - will resume outpatient epogen 10,000U on HD days and monitor Hgb    # Renal bone disease  Pt. with hyperphosphatemia in the setting of ESRD  - resume outpatient Hectorol 1.5mcg IVP on HD days and calcium acetate 1334 mg TID with meals. Low phosphorus diet advised. Monitor serum phosphorus 60F PMHx ESRD on HD (MWF at [facility], Dr De León), cirrhosis, JAK2+ PCV with splenomegaly, pancreatic cysts and HTN - admitted for epigastric pain found to have marked increase splenomegaly.        # ESRD   Pt. with ESRD on HD three times a week (MWF @ [facility], Dr. De León). Last HD was on 1/4/21 via LUE AVF, target/dry weight 55kg. Pt. clinically stable. Labs reviewed.   - plan for maintenance HD today UF goal 1.5L as BP tolerate  - monitor BMP, fluid restriction 1.2 liters per day, renally dose medications per HD, renal diet    # Hypertension  BP in acceptable range. Monitor BP on current BP medications. Low salt diet advised    # Anemia   Pt. with anemia in the setting of ESRD Hgb level at acceptable target.   - will resume outpatient epogen 10,000U on HD days and monitor Hgb    # Renal bone disease  Pt. with hyperphosphatemia in the setting of ESRD  - resume outpatient Hectorol 1.5mcg IVP on HD days and calcium acetate 1334 mg TID with meals. Low phosphorus diet advised. Monitor serum phosphorus

## 2021-01-06 NOTE — PROGRESS NOTE ADULT - PROBLEM SELECTOR PLAN 6
DVT: low improve risk score  Diet: Clear renal diet       Full Code DVT px: hep subc  Diet: Clear renal diet       Full Code

## 2021-01-06 NOTE — PROGRESS NOTE ADULT - PROBLEM SELECTOR PLAN 2
-increased on CT from prior; no new infarcts noted   - concern for splenic infarct   -Ddx and plan as above

## 2021-01-06 NOTE — H&P ADULT - HISTORY OF PRESENT ILLNESS
Patient is a 60yoF with Hx of HTN, pancreatic cysts, cirrhosis, ESRD on HD (MWF), JAK2+ PCV with splenomegaly presenting with abdominal pain x1 day.     In ED, afebrile, normotensive. With leukocytosis, lipase 68, CT A/P with increased splenomegaly, extensive upper abd varices in perigastric/perisplenic region, bibasilar interstitial/alveolar edema. CXR clear. Given morphine without pain control, followed by dilaudid, with improvement.    Patient is a 60yoF with Hx of HTN, pancreatic cysts, cirrhosis, ESRD on HD (MWF), JAK2+ PCV with splenomegaly presenting with abdominal pain x1 day.     In ED, afebrile, normotensive. With leukocytosis, lipase 68, CT A/P with increased splenomegaly, extensive upper abd varices in perigastric/perisplenic region, bibasilar interstitial/alveolar edema. CXR clear. Given morphine without pain control, followed by dilaudid, with improvement.     Reports pain since Monday, in epigastric region with radiation to the LLQ, left flank, and left shoulder. 10/10 at worst, stabbing in nature. Also reports acid reflux symptoms and mild nausea. Denies vomiting, diarrhea, melena, hematochezia. Per patient, has not been on hydroxyurea for awhile.

## 2021-01-06 NOTE — CHART NOTE - NSCHARTNOTEFT_GEN_A_CORE
Dialysis Consent   Thoroughly reviewed risks and benefits of HD/CRRT with patient who agrees to continue maintenance dialytic treatment. All questions answered.   Witnessed by RN.  Paper form consent obtained/signed and placed in patient's chart

## 2021-01-06 NOTE — H&P ADULT - PROBLEM SELECTOR PLAN 6
DVT: low improve risk score  Diet: Clear renal diet   Dispo: to floors for pain control    Full Code

## 2021-01-06 NOTE — PROGRESS NOTE ADULT - PROBLEM SELECTOR PLAN 1
Concern for splenic infarct   - s/p hematology consult, f/u recs  - f/u NM Liver + spleen scan   - f/u US abdomen doppler   -dilaudid 1mg IV q4h PRN for now, transition to PO if patient tolerates diet  -clear liquid diet

## 2021-01-06 NOTE — CONSULT NOTE ADULT - SUBJECTIVE AND OBJECTIVE BOX
HPI:  Patient is a 60yoF with Hx of HTN, pancreatic cysts, cirrhosis, ESRD on HD (MWF), JAK2+ PCV with splenomegaly presenting with abdominal pain x1 day.     In ED, afebrile, normotensive. With leukocytosis, lipase 68, CT A/P with increased splenomegaly, extensive upper abd varices in perigastric/perisplenic region, bibasilar interstitial/alveolar edema. CXR clear. Given morphine without pain control, followed by dilaudid, with improvement.     Reports pain since Monday, in epigastric region with radiation to the LLQ, left flank, and left shoulder. 10/10 at worst, stabbing in nature. Also reports acid reflux symptoms and mild nausea. Denies vomiting, diarrhea, melena, hematochezia. Per patient, has not been on hydroxyurea for awhile.     Pt was dx'ed in 2012, complicated with hx of splenic vein thrombosis in 2015.  She had a BMBx done at time of diagnosis.  She was initially non compliant with hydrea, now has been compliant since 2017.  She had a repeat abd US showing the chronic thrombosis.         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 Denies LE swelling   Gastrointestinal: denies abdominal pain/ nausea/ vomiting/ diarrhea. Denies BRBPR/ melena   Genitourinary: Denies dysuria  Musculoskeletal: Denies mylagias   Neurological: Denies syncope  Psychiatric: Denies mood disturbance   Hematology/Lymphatics: denies bleeding/bruising. Denies skin lumps 	    MEDICATIONS  (STANDING):  calcium acetate 1334 milliGRAM(s) Oral three times a day with meals  heparin   Injectable 5000 Unit(s) SubCutaneous every 12 hours  pantoprazole    Tablet 40 milliGRAM(s) Oral before breakfast    MEDICATIONS  (PRN):  HYDROmorphone  Injectable 1 milliGRAM(s) IV Push every 4 hours PRN Severe Pain (7 - 10)      Allergies    No Known Allergies    Intolerances        SOCIAL HISTORY:    FAMILY HISTORY:  Family history of malignant neoplasm (Grandparent)  head and neck in father    Family history of hypertension in mother        Vital Signs Last 24 Hrs  T(C): 36.8 (06 Jan 2021 05:45), Max: 37.1 (05 Jan 2021 19:45)  T(F): 98.2 (06 Jan 2021 05:45), Max: 98.7 (05 Jan 2021 19:45)  HR: 97 (06 Jan 2021 05:45) (83 - 97)  BP: 131/88 (06 Jan 2021 05:45) (124/72 - 144/82)  BP(mean): 99 (05 Jan 2021 23:50) (87 - 99)  RR: 18 (06 Jan 2021 05:45) (18 - 25)  SpO2: 99% (06 Jan 2021 05:45) (95% - 100%)    PHYSICAL EXAM:    GENERAL: NAD, AAOx3   HEAD:  NC/AT  EYES: EOMI, PERRLA, no scleral icterus  HEENT: Moist mucous membranes  LUNG: Clear to auscultation bilaterally; No rales, rhonchi, wheezing, or rubs  HEART: RRR; No murmurs, rubs, or gallops  ABDOMEN: +BS, ST/ND/NT  EXTREMITIES:  2+ Peripheral Pulses, No clubbing, cyanosis, or edema  LAD: no palpable adenopathy    LABS:                        10.1   14.49 )-----------( 220      ( 05 Jan 2021 16:55 )             33.8     01-05    136  |  93<L>  |  28<H>  ----------------------------<  98  5.1   |  25  |  5.07<H>    Ca    9.0      05 Jan 2021 16:55    TPro  8.0  /  Alb  4.5  /  TBili  4.5<H>  /  DBili  x   /  AST  33  /  ALT  12  /  AlkPhos  132<H>  01-05    PT/INR - ( 05 Jan 2021 19:59 )   PT: 14.4 sec;   INR: 1.21 ratio         PTT - ( 05 Jan 2021 19:59 )  PTT:36.8 sec          RADIOLOGY & ADDITIONAL STUDIES: HPI:  Patient is a 60yoF with Hx of HTN, ESRD on HD (MWF), JAK2+ PCV with splenomegaly presenting with abdominal pain x1 day.     In ED, afebrile, normotensive. With leukocytosis, lipase 68, CT A/P with increased splenomegaly, extensive upper abd varices in perigastric/perisplenic region, bibasilar interstitial/alveolar edema. CXR clear. Given morphine without pain control, followed by dilaudid, with improvement.     Reports pain since Monday, in epigastric region with radiation to the LLQ, left flank, and left shoulder. 10/10 at worst, stabbing in nature. Also reports acid reflux symptoms and mild nausea. Denies vomiting, diarrhea, melena, hematochezia. Per patient, has not been on hydroxyurea for awhile.     Pt was dx'ed in 2012, complicated with hx of splenic vein thrombosis in 2015.  She had a BMBx done at time of diagnosis.  She was initially non compliant with hydrea, now has been compliant since 2017.  She had a repeat abd US showing the chronic thrombosis.         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 Denies LE swelling   Gastrointestinal: denies abdominal pain/ nausea/ vomiting/ diarrhea. Denies BRBPR/ melena   Genitourinary: Denies dysuria  Musculoskeletal: Denies mylagias   Neurological: Denies syncope  Psychiatric: Denies mood disturbance   Hematology/Lymphatics: denies bleeding/bruising. Denies skin lumps 	    MEDICATIONS  (STANDING):  calcium acetate 1334 milliGRAM(s) Oral three times a day with meals  heparin   Injectable 5000 Unit(s) SubCutaneous every 12 hours  pantoprazole    Tablet 40 milliGRAM(s) Oral before breakfast    MEDICATIONS  (PRN):  HYDROmorphone  Injectable 1 milliGRAM(s) IV Push every 4 hours PRN Severe Pain (7 - 10)      Allergies    No Known Allergies    Intolerances        SOCIAL HISTORY:    FAMILY HISTORY:  Family history of malignant neoplasm (Grandparent)  head and neck in father    Family history of hypertension in mother        Vital Signs Last 24 Hrs  T(C): 36.8 (06 Jan 2021 05:45), Max: 37.1 (05 Jan 2021 19:45)  T(F): 98.2 (06 Jan 2021 05:45), Max: 98.7 (05 Jan 2021 19:45)  HR: 97 (06 Jan 2021 05:45) (83 - 97)  BP: 131/88 (06 Jan 2021 05:45) (124/72 - 144/82)  BP(mean): 99 (05 Jan 2021 23:50) (87 - 99)  RR: 18 (06 Jan 2021 05:45) (18 - 25)  SpO2: 99% (06 Jan 2021 05:45) (95% - 100%)    PHYSICAL EXAM:    GENERAL: NAD, AAOx3   HEAD:  NC/AT  EYES: EOMI, PERRLA, no scleral icterus  HEENT: Moist mucous membranes  LUNG: Clear to auscultation bilaterally; No rales, rhonchi, wheezing, or rubs  HEART: RRR; No murmurs, rubs, or gallops  ABDOMEN: +BS, ST/ND/NT  EXTREMITIES:  2+ Peripheral Pulses, No clubbing, cyanosis, or edema  LAD: no palpable adenopathy    LABS:                        10.1   14.49 )-----------( 220      ( 05 Jan 2021 16:55 )             33.8     01-05    136  |  93<L>  |  28<H>  ----------------------------<  98  5.1   |  25  |  5.07<H>    Ca    9.0      05 Jan 2021 16:55    TPro  8.0  /  Alb  4.5  /  TBili  4.5<H>  /  DBili  x   /  AST  33  /  ALT  12  /  AlkPhos  132<H>  01-05    PT/INR - ( 05 Jan 2021 19:59 )   PT: 14.4 sec;   INR: 1.21 ratio         PTT - ( 05 Jan 2021 19:59 )  PTT:36.8 sec          RADIOLOGY & ADDITIONAL STUDIES:

## 2021-01-06 NOTE — H&P ADULT - NSHPREVIEWOFSYSTEMS_GEN_ALL_CORE
REVIEW OF SYSTEMS      General:	-malaise, fever    Skin/Breast: -rash, lesion  	  ENMT:	-dysphagia, odynophagia    Respiratory and Thorax: -SOB, cough  	  Cardiovascular:	-chest pain, palpitations    Gastrointestinal:	+abdominal pain, reflux, nausea; -vomiting    Genitourinary: -dysuria, retention	    Musculoskeletal:	-weakness, pain    Neurological:	-numbness, tingling

## 2021-01-06 NOTE — H&P ADULT - ATTENDING COMMENTS
59F w/ ESRD on HD, Hx of splenomegaly and splenic infarct 2/2 to PCV, HTN, Jak2 , pancreatic cysts p/w LUQ  abdominal pain x1 day, patient afebrile , normotensive, labs w/  wbc 14 , tbili 4.5,  CT with increased splenomegaly,  will provide pain control , advance diet as tolerates , obtain hepatic doppler , day team can discuss with Heme and consult hepatology

## 2021-01-06 NOTE — PROGRESS NOTE ADULT - ATTENDING COMMENTS
Krystina Aguilar MD  Division of Hospital Medicine  [facility]   Pager: [phone]    Patient seen and examined today with Team 5 Resident, Intern, and MS3. Agree with above findings, assessment, and plan with the following additions/exceptions:    Overall, 58 yo female with ESRD on HD, JAK2+ PCV (not compliant on her hydrea), HTN, and pancreatic cysts who presents with acute worsening of her epigastric/LLQ pain.     1. Epigastric/LLQ pain: in setting of her splenomegaly, my biggest concern would be for splenic infarct especially in setting of acute worsening of her pain. f/u nuclear liver +spleen study (to identify infarcts that could have been on CT). abd duplex negative for thrombosis.   2. JAK2+ PCV: admits to me in Nepali, she does not take her hydrea. heme consult recs appreciated.   3. ESRD on HD: on M/W/F schedule. follows with Dr. De León. nephro on board for her maintenance HD.  4. Abd varices on CT: concerning for cirrhosis. per patient, she has never seen hepatology nor was ever told term cirrhosis/"liver problem"/elevated bilirubins in the past. denies EtOH abuse. admit to a lot of tylenol use last year especially with dialysis but unable to recall how much. will consult hepatology for cirrhosis work-up in AM.     Rest as detailed above.

## 2021-01-06 NOTE — H&P ADULT - PROBLEM SELECTOR PLAN 1
-in LUQ with radiation to the left shoulder suggestive of possible splenic pain in the setting of findings on CT an no other definitive source thus far  -GI and heme consult in the AM, since etiology may be related to congestion vs polycythemia vera   -low likelihood for infection as afebrile and without infectious finding on CT, however may consider CMV, other viral sources  -dilaudid 1mg IV q4h PRN for now, transition to PO if patient tolerates diet  -clear liquid diet for now -in LUQ with radiation to the left shoulder suggestive of possible splenic pain in the setting of findings on CT an no other definitive source thus far  -GI and heme consult in the AM, since etiology may be related to congestion vs polycythemia vera   -low likelihood for infection as afebrile and without infectious finding on CT, however will test for CMV, EBV  -dilaudid 1mg IV q4h PRN for now, transition to PO if patient tolerates diet  -clear liquid diet for now -in LUQ with radiation to the left shoulder suggestive of possible splenic pain in the setting of findings on CT an no other definitive source thus far  -GI consult in AM  -low likelihood for infection as afebrile and without infectious finding on CT, however will test for CMV, EBV  -dilaudid 1mg IV q4h PRN for now, transition to PO if patient tolerates diet  -clear liquid diet for now  -obtain hepatic/splenic doppler US

## 2021-01-06 NOTE — CONSULT NOTE ADULT - ASSESSMENT
60yoF with Hx of HTN, pancreatic cysts, cirrhosis, ESRD on HD (MWF), JAK2+ PCV with splenomegaly presenting with abdominal pain x1 day.  60yoF with Hx of HTN, ESRD on HD (MWF), JAK2+ PCV with splenomegaly presenting with abdominal pain x1 day.  60yoF with Hx of HTN, ESRD on HD (MWF), JAK2+ PCV with splenomegaly presenting with abdominal pain x1 day.     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

## 2021-01-06 NOTE — H&P ADULT - NSHPLABSRESULTS_GEN_ALL_CORE
10.1   14.49 )-----------( 220      ( 05 Jan 2021 16:55 )             33.8     01-05    136  |  93<L>  |  28<H>  ----------------------------<  98  5.1   |  25  |  5.07<H>    Ca    9.0      05 Jan 2021 16:55    TPro  8.0  /  Alb  4.5  /  TBili  4.5<H>  /  DBili  x   /  AST  33  /  ALT  12  /  AlkPhos  132<H>  01-05    EXAM:  CT ABDOMEN AND PELVIS IC                            PROCEDURE DATE:  01/05/2021            INTERPRETATION:  CLINICAL INFORMATION: Epigastric pain.    COMPARISON: CT abdomen pelvis 3/5/2019.    PROCEDURE: CT of the Abdomen and Pelvis was performed with intravenous contrast.  Intravenous contrast: 90 ml Omnipaque 350. 10 ml discarded.  Oral contrast: None.  Sagittal and coronal reformats were performed.    FINDINGS:    LOWER CHEST: Bibasilar groundglass opacities and interstitial thickening may reflect alveolar and interstitial pulmonary edema. Heart is mildly enlarged. No pleural effusion.    LIVER: Within normal limits.  BILE DUCTS: Normal caliber.  GALLBLADDER: Within normal limits.  SPLEEN: Markedly enlarged spleen measuring up to 21 cm, increased since prior.  PANCREAS: Within normal limits.  ADRENALS: Within normal limits.    KIDNEYS/URETERS: Bilaterally atrophic kidneys. Bilateral renal hypodensities too small to characterize. No hydronephrosis.  BLADDER: Within normal limits.  REPRODUCTIVE ORGANS: Uterus and adnexa are unremarkable.    BOWEL: No bowel obstruction. Appendix is normal.  PERITONEUM: No free air.  VESSELS: Extensive upper abdominal varices, notably in the perigastric and splenic gastric region.  RETROPERITONEUM/LYMPH NODES: No lymphadenopathy.  ABDOMINAL WALL: Tiny fat-containing umbilical hernia.  BONES: Mild degenerative changes of the spine.    IMPRESSION:    Marked splenomegaly, increased since prior CT of 3/5/2019.  Extensive upper abdominal varices, notably in the perigastric and perisplenic region.    Bibasilar interstitial and alveolar edema.      EXAM:  XR CHEST PORTABLE URGENT 1V                        PROCEDURE DATE:  01/05/2021    INTERPRETATION:  CLINICAL INFORMATION: Shortness of breath  EXAM: Frontal radiograph of the chest.  COMPARISON: Chest radiograph from 1/25/2020.    FINDINGS:  The lungs are clear.  There is no pleural effusion or pneumothorax.  The heart size is not well evaluated on this projection.  Redemonstration of vascular stents overlying the left axillary and superior mediastinal region.  The visualized osseous structures demonstrate no acute pathology.    IMPRESSION:  Clear lungs.

## 2021-01-06 NOTE — CONSULT NOTE ADULT - SUBJECTIVE AND OBJECTIVE BOX
[facility] DIVISION OF KIDNEY DISEASES AND HYPERTENSION   -- INITIAL CONSULT NOTE --  Fei Bruce, Nephrology Fellow     NS Pager: [phone] / ALBERTA Pager: [phone]  After 5pm or on weekend, please page the on-call fellow)  --------------------------------------------------------------------------------    HPI: 60F PMHx ESRD on HD (MWF at [facility], Dr De León), cirrhosis, JAK2+ PCV with splenomegaly, pancreatic cysts and HTN who present to [facility] for abdominal pain x 1 day.  Patient report abdominal pain started Monday 1/4/21, epigastric, stabbing pain, radiating to LLQ/L flank/shoulder with severity 10/10 at peak.  Its accompanied by nausea w/o vomiting and denies any trauma/falls, chest pain, sob, melena, hematochezia, fever, chills.  In ED, CT scan showed marked increase of splenomegaly with extensive varices otherwise labs noted for leukocytosis 14, lipase 86, AST/ALT wnl, neg trop.    Nephrology consulted for ESRD on HD.  Patient has history kidney disease, initially on PD then transition to HD, currently goes to [facility] center under the care of Dr. De León.  Pt is on MWF schedule, last treatment was on Monday 1/4/21.    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  Splenomegaly 2015  Hypertension  Peptic ulcer disease  Polycythemia vera  AV fistula Placed 9/18  Hemoperitoneum as complication of peritoneal dialysis s/p removal 9/18  Peritoneal dialysis catheter in place Placed 8/9/2017    FAMILY HISTORY:  Family history of malignant neoplasm (Grandparent) head and neck in father  Family history of hypertension in mother    PAST SOCIAL HISTORY:  ALLERGIES & MEDICATIONS  --------------------------------------------------------------------------------  Allergies    No Known Allergies    Intolerances    Standing Inpatient Medications  calcium acetate 1334 milliGRAM(s) Oral three times a day with meals  pantoprazole    Tablet 40 milliGRAM(s) Oral before breakfast    PRN Inpatient Medications  HYDROmorphone  Injectable 1 milliGRAM(s) IV Push every 4 hours PRN    REVIEW OF SYSTEMS    VITALS/PHYSICAL EXAM  --------------------------------------------------------------------------------  T(C): 36.8 (01-06-21 @ 05:45), Max: 37.1 (01-05-21 @ 19:45)  HR: 97 (01-06-21 @ 05:45) (83 - 97)  BP: 131/88 (01-06-21 @ 05:45) (124/72 - 144/82)  RR: 18 (01-06-21 @ 05:45) (18 - 25)  SpO2: 99% (01-06-21 @ 05:45) (95% - 100%)  Wt(kg): --  Height (cm): 160 (01-06-21 @ 05:45)  Weight (kg): 52.5 (01-06-21 @ 05:45)  BMI (kg/m2): 20.5 (01-06-21 @ 05:45)  BSA (m2): 1.53 (01-06-21 @ 05:45)      Physical Exam:      LABS/STUDIES  --------------------------------------------------------------------------------                10.1   14.49 >-----------<  220      [01-05-21 @ 16:55]              33.8     136  |  93  |  28  ----------------------------<  98      [01-05-21 @ 16:55]  5.1   |  25  |  5.07        Ca     9.0     [01-05-21 @ 16:55]    TPro  8.0  /  Alb  4.5  /  TBili  4.5  /  DBili  x   /  AST  33  /  ALT  12  /  AlkPhos  132  [01-05-21 @ 16:55]    PT/INR: PT 14.4 , INR 1.21       [01-05-21 @ 19:59]  PTT: 36.8       [01-05-21 @ 19:59]  Creatinine Trend:  SCr 5.07 [01-05 @ 16:55]    Urinalysis - [12-07-18 @ 12:41]      Color Yellow / Appearance Clear / SG 1.035 / pH 8.0      Gluc 100 mg/dL / Ketone Negative  / Bili Negative / Urobili Negative       Blood Negative / Protein 100 mg/dL / Leuk Est Negative / Nitrite Negative      RBC 2 / WBC 8 / Hyaline 3 / Gran  / Sq Epi  / Non Sq Epi 10 / Bacteria Many      PTH -- (Ca 8.6)      [01-26-20 @ 09:35]   175  HbA1c 4.9      [06-01-19 @ 00:46]    HBsAb <3.0      [03-13-18 @ 15:04]  HBsAb Reactive      [01-25-20 @ 16:12]  HBsAg Nonreact      [05-05-18 @ 15:09]  HBcAb Nonreact      [03-13-18 @ 15:04]  HCV 0.21, Nonreact      [05-05-18 @ 15:09]  HIV Nonreact      [01-19-17 @ 20:01]    RAHUL: titer 1:80, pattern Homogeneous      [05-16-18 @ 22:43]  C3 Complement 100      [01-19-17 @ 20:01]  C4 Complement 32      [01-19-17 @ 20:01]  Rheumatoid Factor <7.0      [01-19-17 @ 20:01]  ANCA: cANCA Negative, pANCA Negative, atypical ANCA Negative      [01-19-17 @ 20:01]  anti-GBM <0.2      [01-20-17 @ 01:52]  ASLO 59      [01-19-17 @ 20:01]  Syphilis Screen (Treponema Pallidum Ab) Negative      [01-19-17 @ 20:01]  Free Light Chains: kappa 12.00, lambda 14.10, ratio = 0.85      [01-23 @ 14:39]  Immunofixation Serum:   No Monoclonal Band Identified      [01-23-17 @ 14:39]  SPEP Interpretation: Polyclonal Gammopathy      [01-23-17 @ 14:39]   [facility] DIVISION OF KIDNEY DISEASES AND HYPERTENSION   -- INITIAL CONSULT NOTE --  Fei Bruce, Nephrology Fellow     NS Pager: [phone] / ALBERTA Pager: [phone]  After 5pm or on weekend, please page the on-call fellow)  --------------------------------------------------------------------------------    HPI: 60F PMHx ESRD on HD (MWF at [facility], Dr De León), cirrhosis, JAK2+ PCV with splenomegaly, pancreatic cysts and HTN who present to [facility] for abdominal pain x 1 day.  Patient report abdominal pain started Monday 1/4/21, epigastric, stabbing pain, radiating to LLQ/L flank/shoulder with severity 10/10 at peak.  Its accompanied by nausea w/o vomiting and denies any trauma/falls, chest pain, sob, melena, hematochezia, fever, chills.  In ED, CT scan showed marked increase of splenomegaly with extensive varices otherwise labs noted for leukocytosis 14, lipase 86, AST/ALT wnl, neg trop.    Nephrology consulted for ESRD on HD.  Patient has history kidney disease, initially on PD then transition to HD, currently goes to [facility] center under the care of Dr. De León.  Pt is on MWF schedule, last treatment was on Monday 1/4/21.    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  Splenomegaly 2015  Hypertension  Peptic ulcer disease  Polycythemia vera  AV fistula Placed 9/18  Hemoperitoneum as complication of peritoneal dialysis s/p removal 9/18  Peritoneal dialysis catheter in place Placed 8/9/2017    FAMILY HISTORY:  Family history of malignant neoplasm (Grandparent) head and neck in father  Family history of hypertension in mother    PAST SOCIAL HISTORY:  ALLERGIES & MEDICATIONS  --------------------------------------------------------------------------------  Allergies    No Known Allergies    Intolerances    Standing Inpatient Medications  calcium acetate 1334 milliGRAM(s) Oral three times a day with meals  pantoprazole    Tablet 40 milliGRAM(s) Oral before breakfast    PRN Inpatient Medications  HYDROmorphone  Injectable 1 milliGRAM(s) IV Push every 4 hours PRN    REVIEW OF SYSTEMS  Gen: no fever, chills, weakness  Respiratory: No dyspnea, cough  CV: No chest pain, orthopnea  GI: + abdominal pain, nausea.  No vomiting, diarrhea  MSK: no edema  Neuro: No dizziness, lightheadedness  All other systems were reviewed and are negative, except as noted.    VITALS/PHYSICAL EXAM  --------------------------------------------------------------------------------  T(C): 36.8 (01-06-21 @ 05:45), Max: 37.1 (01-05-21 @ 19:45)  HR: 97 (01-06-21 @ 05:45) (83 - 97)  BP: 131/88 (01-06-21 @ 05:45) (124/72 - 144/82)  RR: 18 (01-06-21 @ 05:45) (18 - 25)  SpO2: 99% (01-06-21 @ 05:45) (95% - 100%)  Wt(kg): --  Height (cm): 160 (01-06-21 @ 05:45)  Weight (kg): 52.5 (01-06-21 @ 05:45)  BMI (kg/m2): 20.5 (01-06-21 @ 05:45)  BSA (m2): 1.53 (01-06-21 @ 05:45)    Physical Exam:  	Gen: NAD, well-appearing on room air  	HEENT: moist mucous membrane  	Pulm: CTA B/L, no crackles  	CV: RRR, S1S2; no rub/murmur  	GI: +BS, soft  	MSK: Warm, no edema              Neuro: AAOx3  	Psych: Normal affect and mood  	Skin: Warm, no cyanosis  	Vascular access: LUE AVF +thrills/bruits    LABS/STUDIES  --------------------------------------------------------------------------------              10.1   14.49 >-----------<  220      [01-05-21 @ 16:55]              33.8     136  |  93  |  28  ----------------------------<  98      [01-05-21 @ 16:55]  5.1   |  25  |  5.07        Ca     9.0     [01-05-21 @ 16:55]    TPro  8.0  /  Alb  4.5  /  TBili  4.5  /  DBili  x   /  AST  33  /  ALT  12  /  AlkPhos  132  [01-05-21 @ 16:55]    PT/INR: PT 14.4 , INR 1.21       [01-05-21 @ 19:59]  PTT: 36.8       [01-05-21 @ 19:59]  Creatinine Trend:  SCr 5.07 [01-05 @ 16:55]    Urinalysis - [12-07-18 @ 12:41]      Color Yellow / Appearance Clear / SG 1.035 / pH 8.0      Gluc 100 mg/dL / Ketone Negative  / Bili Negative / Urobili Negative       Blood Negative / Protein 100 mg/dL / Leuk Est Negative / Nitrite Negative      RBC 2 / WBC 8 / Hyaline 3 / Gran  / Sq Epi  / Non Sq Epi 10 / Bacteria Many      PTH -- (Ca 8.6)      [01-26-20 @ 09:35]   175  HbA1c 4.9      [06-01-19 @ 00:46]    HBsAb <3.0      [03-13-18 @ 15:04]  HBsAb Reactive      [01-25-20 @ 16:12]  HBsAg Nonreact      [05-05-18 @ 15:09]  HBcAb Nonreact      [03-13-18 @ 15:04]  HCV 0.21, Nonreact      [05-05-18 @ 15:09]  HIV Nonreact      [01-19-17 @ 20:01]    RAHUL: titer 1:80, pattern Homogeneous      [05-16-18 @ 22:43]  C3 Complement 100      [01-19-17 @ 20:01]  C4 Complement 32      [01-19-17 @ 20:01]  Rheumatoid Factor <7.0      [01-19-17 @ 20:01]  ANCA: cANCA Negative, pANCA Negative, atypical ANCA Negative      [01-19-17 @ 20:01]  anti-GBM <0.2      [01-20-17 @ 01:52]  ASLO 59      [01-19-17 @ 20:01]  Syphilis Screen (Treponema Pallidum Ab) Negative      [01-19-17 @ 20:01]  Free Light Chains: kappa 12.00, lambda 14.10, ratio = 0.85      [01-23 @ 14:39]  Immunofixation Serum:   No Monoclonal Band Identified      [01-23-17 @ 14:39]  SPEP Interpretation: Polyclonal Gammopathy      [01-23-17 @ 14:39]   [facility] DIVISION OF KIDNEY DISEASES AND HYPERTENSION   -- INITIAL CONSULT NOTE --  Fei Bruce, Nephrology Fellow     NS Pager: [phone] / ALBERTA Pager: [phone]  After 5pm or on weekend, please page the on-call fellow)  --------------------------------------------------------------------------------    HPI: 60F PMHx ESRD on HD (MWF at [facility], Dr De León), cirrhosis, JAK2+ PCV with splenomegaly, pancreatic cysts and HTN who present to [facility] for abdominal pain x 1 day.  Patient report abdominal pain started Monday 1/4/21, epigastric, stabbing pain, radiating to LLQ/L flank/shoulder with severity 10/10 at peak.  Its accompanied by nausea w/o vomiting and denies any trauma/falls, chest pain, sob, melena, hematochezia, fever, chills.  In ED, CT scan showed marked increase of splenomegaly with extensive varices otherwise labs noted for leukocytosis 14, lipase 86, AST/ALT wnl, neg trop.    Nephrology consulted for ESRD on HD.  Patient has history kidney disease, initially on PD then transition to HD, currently goes to [facility] center under the care of Dr. De León.  Pt is on MWF schedule, last treatment was on Monday 1/4/21 and target/dry weight is 55 kg.    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  Splenomegaly 2015  Hypertension  Peptic ulcer disease  Polycythemia vera  AV fistula Placed 9/18  Hemoperitoneum as complication of peritoneal dialysis s/p removal 9/18  Peritoneal dialysis catheter in place Placed 8/9/2017    FAMILY HISTORY:  Family history of malignant neoplasm (Grandparent) head and neck in father  Family history of hypertension in mother    PAST SOCIAL HISTORY:  ALLERGIES & MEDICATIONS  --------------------------------------------------------------------------------  Allergies    No Known Allergies    Intolerances    Standing Inpatient Medications  calcium acetate 1334 milliGRAM(s) Oral three times a day with meals  pantoprazole    Tablet 40 milliGRAM(s) Oral before breakfast    PRN Inpatient Medications  HYDROmorphone  Injectable 1 milliGRAM(s) IV Push every 4 hours PRN    REVIEW OF SYSTEMS  Gen: no fever, chills, weakness  Respiratory: No dyspnea, cough  CV: No chest pain, orthopnea  GI: + abdominal pain, nausea.  No vomiting, diarrhea  MSK: no edema  Neuro: No dizziness, lightheadedness  All other systems were reviewed and are negative, except as noted.    VITALS/PHYSICAL EXAM  --------------------------------------------------------------------------------  T(C): 36.8 (01-06-21 @ 05:45), Max: 37.1 (01-05-21 @ 19:45)  HR: 97 (01-06-21 @ 05:45) (83 - 97)  BP: 131/88 (01-06-21 @ 05:45) (124/72 - 144/82)  RR: 18 (01-06-21 @ 05:45) (18 - 25)  SpO2: 99% (01-06-21 @ 05:45) (95% - 100%)  Wt(kg): --  Height (cm): 160 (01-06-21 @ 05:45)  Weight (kg): 52.5 (01-06-21 @ 05:45)  BMI (kg/m2): 20.5 (01-06-21 @ 05:45)  BSA (m2): 1.53 (01-06-21 @ 05:45)    Physical Exam:  	Gen: NAD, well-appearing on room air  	HEENT: moist mucous membrane  	Pulm: CTA B/L, no crackles  	CV: RRR, S1S2; no rub/murmur  	GI: +BS, soft  	MSK: Warm, no edema              Neuro: AAOx3  	Psych: Normal affect and mood  	Skin: Warm, no cyanosis  	Vascular access: LUE AVF +thrills/bruits    LABS/STUDIES  --------------------------------------------------------------------------------              10.1   14.49 >-----------<  220      [01-05-21 @ 16:55]              33.8     136  |  93  |  28  ----------------------------<  98      [01-05-21 @ 16:55]  5.1   |  25  |  5.07        Ca     9.0     [01-05-21 @ 16:55]    TPro  8.0  /  Alb  4.5  /  TBili  4.5  /  DBili  x   /  AST  33  /  ALT  12  /  AlkPhos  132  [01-05-21 @ 16:55]    PT/INR: PT 14.4 , INR 1.21       [01-05-21 @ 19:59]  PTT: 36.8       [01-05-21 @ 19:59]  Creatinine Trend:  SCr 5.07 [01-05 @ 16:55]    Urinalysis - [12-07-18 @ 12:41]      Color Yellow / Appearance Clear / SG 1.035 / pH 8.0      Gluc 100 mg/dL / Ketone Negative  / Bili Negative / Urobili Negative       Blood Negative / Protein 100 mg/dL / Leuk Est Negative / Nitrite Negative      RBC 2 / WBC 8 / Hyaline 3 / Gran  / Sq Epi  / Non Sq Epi 10 / Bacteria Many      PTH -- (Ca 8.6)      [01-26-20 @ 09:35]   175  HbA1c 4.9      [06-01-19 @ 00:46]    HBsAb <3.0      [03-13-18 @ 15:04]  HBsAb Reactive      [01-25-20 @ 16:12]  HBsAg Nonreact      [05-05-18 @ 15:09]  HBcAb Nonreact      [03-13-18 @ 15:04]  HCV 0.21, Nonreact      [05-05-18 @ 15:09]  HIV Nonreact      [01-19-17 @ 20:01]    RAHUL: titer 1:80, pattern Homogeneous      [05-16-18 @ 22:43]  C3 Complement 100      [01-19-17 @ 20:01]  C4 Complement 32      [01-19-17 @ 20:01]  Rheumatoid Factor <7.0      [01-19-17 @ 20:01]  ANCA: cANCA Negative, pANCA Negative, atypical ANCA Negative      [01-19-17 @ 20:01]  anti-GBM <0.2      [01-20-17 @ 01:52]  ASLO 59      [01-19-17 @ 20:01]  Syphilis Screen (Treponema Pallidum Ab) Negative      [01-19-17 @ 20:01]  Free Light Chains: kappa 12.00, lambda 14.10, ratio = 0.85      [01-23 @ 14:39]  Immunofixation Serum:   No Monoclonal Band Identified      [01-23-17 @ 14:39]  SPEP Interpretation: Polyclonal Gammopathy      [01-23-17 @ 14:39]

## 2021-01-06 NOTE — H&P ADULT - NSHPPHYSICALEXAM_GEN_ALL_CORE
PHYSICAL EXAM:      Constitutional: NAD, comfortable in bed    Eyes: PERRLA, EOMI    ENMT: Normal ears, nose, oropharynx    Neck: Supple, no masses, LAD    Respiratory: CTAB; limited inhalation 2/2 to epigastric pain. No wheezes or rhonchi     Cardiovascular: Tachycardia, no murmurs, rubs, gallops.     Gastrointestinal: Diffuse abdominal pain to palpation of left abdomen, flank.    Genitourinary:    Rectal:    Extremities:    Vascular:    Neurological:    Skin:    Lymph Nodes:    Musculoskeletal:    Psychiatric: PHYSICAL EXAM:      Constitutional: NAD, comfortable in bed    Eyes: PERRLA, EOMI    ENMT: Normal ears, nose, oropharynx    Neck: Supple, no masses, LAD    Respiratory: CTAB; limited inhalation 2/2 to epigastric pain. No wheezes or rhonchi     Cardiovascular: Tachycardia, no murmurs, rubs, gallops.     Gastrointestinal: Diffuse abdominal pain to palpation of left abdomen, flank    Extremities: Warm and well-perfused. Pulses intact, no edema    Neurological: No focal deficits, moving all limbs.     Skin: No rashes/lesions    Lymph Nodes: No cervical LAD    Musculoskeletal: Normal tone and strength    Psychiatric: AOx3, alert, appropriate affect

## 2021-01-06 NOTE — H&P ADULT - ASSESSMENT
Patient is a 59yoF with ESRD on HD, Hx of splenomegaly and splenic infarct 2/2 to PCV, HTN, pancreatic cysts here with abdominal pain x1 day. CT with increased splenomegaly, but otherwise without acute finding.

## 2021-01-07 DIAGNOSIS — D59.9 ACQUIRED HEMOLYTIC ANEMIA, UNSPECIFIED: ICD-10-CM

## 2021-01-07 DIAGNOSIS — R11.10 VOMITING, UNSPECIFIED: ICD-10-CM

## 2021-01-07 DIAGNOSIS — K76.6 PORTAL HYPERTENSION: ICD-10-CM

## 2021-01-07 DIAGNOSIS — R10.12 LEFT UPPER QUADRANT PAIN: ICD-10-CM

## 2021-01-07 LAB
ALBUMIN SERPL ELPH-MCNC: 4.2 G/DL — SIGNIFICANT CHANGE UP (ref 3.3–5)
ALP SERPL-CCNC: 133 U/L — HIGH (ref 40–120)
ALT FLD-CCNC: 11 U/L — SIGNIFICANT CHANGE UP (ref 10–45)
ANION GAP SERPL CALC-SCNC: 13 MMOL/L — SIGNIFICANT CHANGE UP (ref 5–17)
APTT BLD: 34.7 SEC — SIGNIFICANT CHANGE UP (ref 27.5–35.5)
AST SERPL-CCNC: 15 U/L — SIGNIFICANT CHANGE UP (ref 10–40)
BASOPHILS # BLD AUTO: 0.1 K/UL — SIGNIFICANT CHANGE UP (ref 0–0.2)
BASOPHILS NFR BLD AUTO: 0.7 % — SIGNIFICANT CHANGE UP (ref 0–2)
BILIRUB DIRECT SERPL-MCNC: 0.6 MG/DL — HIGH (ref 0–0.2)
BILIRUB SERPL-MCNC: 5.6 MG/DL — HIGH (ref 0.2–1.2)
BUN SERPL-MCNC: 22 MG/DL — SIGNIFICANT CHANGE UP (ref 7–23)
CALCIUM SERPL-MCNC: 9 MG/DL — SIGNIFICANT CHANGE UP (ref 8.4–10.5)
CHLORIDE SERPL-SCNC: 90 MMOL/L — LOW (ref 96–108)
CO2 SERPL-SCNC: 27 MMOL/L — SIGNIFICANT CHANGE UP (ref 22–31)
CREAT SERPL-MCNC: 4.77 MG/DL — HIGH (ref 0.5–1.3)
EOSINOPHIL # BLD AUTO: 0.18 K/UL — SIGNIFICANT CHANGE UP (ref 0–0.5)
EOSINOPHIL NFR BLD AUTO: 1.2 % — SIGNIFICANT CHANGE UP (ref 0–6)
GLUCOSE BLDC GLUCOMTR-MCNC: 131 MG/DL — HIGH (ref 70–99)
GLUCOSE SERPL-MCNC: 124 MG/DL — HIGH (ref 70–99)
HAPTOGLOB SERPL-MCNC: 32 MG/DL — LOW (ref 34–200)
HCT VFR BLD CALC: 33.9 % — LOW (ref 34.5–45)
HGB BLD-MCNC: 10.2 G/DL — LOW (ref 11.5–15.5)
IMM GRANULOCYTES NFR BLD AUTO: 3.7 % — HIGH (ref 0–1.5)
INR BLD: 1.24 RATIO — HIGH (ref 0.88–1.16)
LDH SERPL L TO P-CCNC: 403 U/L — HIGH (ref 50–242)
LYMPHOCYTES # BLD AUTO: 0.97 K/UL — LOW (ref 1–3.3)
LYMPHOCYTES # BLD AUTO: 6.5 % — LOW (ref 13–44)
MAGNESIUM SERPL-MCNC: 2.2 MG/DL — SIGNIFICANT CHANGE UP (ref 1.6–2.6)
MCHC RBC-ENTMCNC: 28.6 PG — SIGNIFICANT CHANGE UP (ref 27–34)
MCHC RBC-ENTMCNC: 30.1 GM/DL — LOW (ref 32–36)
MCV RBC AUTO: 95 FL — SIGNIFICANT CHANGE UP (ref 80–100)
MONOCYTES # BLD AUTO: 0.78 K/UL — SIGNIFICANT CHANGE UP (ref 0–0.9)
MONOCYTES NFR BLD AUTO: 5.2 % — SIGNIFICANT CHANGE UP (ref 2–14)
NEUTROPHILS # BLD AUTO: 12.33 K/UL — HIGH (ref 1.8–7.4)
NEUTROPHILS NFR BLD AUTO: 82.7 % — HIGH (ref 43–77)
NRBC # BLD: 0 /100 WBCS — SIGNIFICANT CHANGE UP (ref 0–0)
PHOSPHATE SERPL-MCNC: 4.4 MG/DL — SIGNIFICANT CHANGE UP (ref 2.5–4.5)
PLATELET # BLD AUTO: 228 K/UL — SIGNIFICANT CHANGE UP (ref 150–400)
POTASSIUM SERPL-MCNC: 4.5 MMOL/L — SIGNIFICANT CHANGE UP (ref 3.5–5.3)
POTASSIUM SERPL-SCNC: 4.5 MMOL/L — SIGNIFICANT CHANGE UP (ref 3.5–5.3)
PROT SERPL-MCNC: 7.8 G/DL — SIGNIFICANT CHANGE UP (ref 6–8.3)
PROTHROM AB SERPL-ACNC: 14.7 SEC — HIGH (ref 10.6–13.6)
RBC # BLD: 3.57 M/UL — LOW (ref 3.8–5.2)
RBC # BLD: 3.57 M/UL — LOW (ref 3.8–5.2)
RBC # FLD: 20.6 % — HIGH (ref 10.3–14.5)
RETICS #: 140.5 K/UL — HIGH (ref 25–125)
RETICS/RBC NFR: 4 % — HIGH (ref 0.5–2.5)
SODIUM SERPL-SCNC: 130 MMOL/L — LOW (ref 135–145)
WBC # BLD: 14.91 K/UL — HIGH (ref 3.8–10.5)
WBC # FLD AUTO: 14.91 K/UL — HIGH (ref 3.8–10.5)

## 2021-01-07 PROCEDURE — ZZZZZ: CPT

## 2021-01-07 PROCEDURE — 99233 SBSQ HOSP IP/OBS HIGH 50: CPT | Mod: GC

## 2021-01-07 PROCEDURE — 71045 X-RAY EXAM CHEST 1 VIEW: CPT | Mod: 26

## 2021-01-07 PROCEDURE — 74018 RADEX ABDOMEN 1 VIEW: CPT | Mod: 26

## 2021-01-07 PROCEDURE — 78830 RP LOCLZJ TUM SPECT W/CT 1: CPT | Mod: 26

## 2021-01-07 RX ORDER — ONDANSETRON 8 MG/1
4 TABLET, FILM COATED ORAL EVERY 6 HOURS
Refills: 0 | Status: COMPLETED | OUTPATIENT
Start: 2021-01-07 | End: 2021-01-09

## 2021-01-07 RX ORDER — POLYETHYLENE GLYCOL 3350 17 G/17G
17 POWDER, FOR SOLUTION ORAL DAILY
Refills: 0 | Status: DISCONTINUED | OUTPATIENT
Start: 2021-01-07 | End: 2021-01-13

## 2021-01-07 RX ORDER — SENNA PLUS 8.6 MG/1
2 TABLET ORAL AT BEDTIME
Refills: 0 | Status: DISCONTINUED | OUTPATIENT
Start: 2021-01-07 | End: 2021-01-13

## 2021-01-07 RX ORDER — HYDROMORPHONE HYDROCHLORIDE 2 MG/ML
1 INJECTION INTRAMUSCULAR; INTRAVENOUS; SUBCUTANEOUS EVERY 4 HOURS
Refills: 0 | Status: DISCONTINUED | OUTPATIENT
Start: 2021-01-07 | End: 2021-01-08

## 2021-01-07 RX ORDER — HYDROMORPHONE HYDROCHLORIDE 2 MG/ML
0.5 INJECTION INTRAMUSCULAR; INTRAVENOUS; SUBCUTANEOUS EVERY 4 HOURS
Refills: 0 | Status: DISCONTINUED | OUTPATIENT
Start: 2021-01-07 | End: 2021-01-07

## 2021-01-07 RX ORDER — HYDROMORPHONE HYDROCHLORIDE 2 MG/ML
1 INJECTION INTRAMUSCULAR; INTRAVENOUS; SUBCUTANEOUS ONCE
Refills: 0 | Status: DISCONTINUED | OUTPATIENT
Start: 2021-01-07 | End: 2021-01-07

## 2021-01-07 RX ADMIN — Medication 1334 MILLIGRAM(S): at 11:31

## 2021-01-07 RX ADMIN — HEPARIN SODIUM 5000 UNIT(S): 5000 INJECTION INTRAVENOUS; SUBCUTANEOUS at 17:03

## 2021-01-07 RX ADMIN — POLYETHYLENE GLYCOL 3350 17 GRAM(S): 17 POWDER, FOR SOLUTION ORAL at 17:02

## 2021-01-07 RX ADMIN — Medication 1334 MILLIGRAM(S): at 17:03

## 2021-01-07 RX ADMIN — HYDROMORPHONE HYDROCHLORIDE 1 MILLIGRAM(S): 2 INJECTION INTRAMUSCULAR; INTRAVENOUS; SUBCUTANEOUS at 11:30

## 2021-01-07 RX ADMIN — SENNA PLUS 2 TABLET(S): 8.6 TABLET ORAL at 21:51

## 2021-01-07 RX ADMIN — HYDROMORPHONE HYDROCHLORIDE 4 MILLIGRAM(S): 2 INJECTION INTRAMUSCULAR; INTRAVENOUS; SUBCUTANEOUS at 06:33

## 2021-01-07 RX ADMIN — PANTOPRAZOLE SODIUM 40 MILLIGRAM(S): 20 TABLET, DELAYED RELEASE ORAL at 05:42

## 2021-01-07 RX ADMIN — ONDANSETRON 4 MILLIGRAM(S): 8 TABLET, FILM COATED ORAL at 23:33

## 2021-01-07 RX ADMIN — HYDROMORPHONE HYDROCHLORIDE 1 MILLIGRAM(S): 2 INJECTION INTRAMUSCULAR; INTRAVENOUS; SUBCUTANEOUS at 23:33

## 2021-01-07 RX ADMIN — HEPARIN SODIUM 5000 UNIT(S): 5000 INJECTION INTRAVENOUS; SUBCUTANEOUS at 05:42

## 2021-01-07 NOTE — PROGRESS NOTE ADULT - ATTENDING COMMENTS
Krystina Aguilar MD  Division of Hospital Medicine  [facility]   Pager: [phone]    Patient seen and examined today with Team 5 Resident, Intern, and MS3. 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     1. Epigastric/LLQ pain:  abd duplex and NM spleen scan negative for thrombosis nor splenic infarct. etiology still unclear with regards to the acute worsening. but could be 2/2 to her splenomegaly? abd duplex and NM spleen scan negative for thrombosis nor splenic infarct. she describes me what sounds like referred pain in her LLQ, worse with deep breaths.  2. Abd varices on CT: hepatology consult appreciated, concern for noncirrhotic portal hypertension. recommending IR consult for TJ liver bx with pressure gradient measurements, planned for Mon 1/11. Will need HD after. also planned for EGD with GI early next week.  3. Low grade fever: isolated 100.1. etiology unclear. check UA. if leukocytosis continues to uptrend, may send set of blood cx as well.  4. JAK2+ PCV: admits to me in Mongolian, she does not take her hydroxyurea. heme consult recs appreciated. resume hydroxyurea, outpatient BM bx.  5. ESRD on HD: on M/W/F schedule. follows with Dr. De León. nephro on board for her maintenance HD.    Rest as detailed above. Krystina Aguilar MD  Division of Hospital Medicine  [facility]   Pager: [phone]    Patient seen and examined today with Team 5 Resident, Intern, and MS3. Agree with above findings, assessment, and plan with the following additions/exceptions:    Overall, 58 yo female with ESRD on HD, JAK2+ PCV (not compliant on her hydroxyurea), HTN, and pancreatic cysts who presents with acute worsening of her epigastric/LLQ pain.     1. Epigastric/LLQ pain:  abd duplex and NM spleen scan negative for thrombosis nor splenic infarct. etiology still unclear with regards to the acute worsening. but could be 2/2 to her splenomegaly? abd duplex and NM spleen scan negative for thrombosis nor splenic infarct. she describes me what sounds like referred pain in her LLQ, worse with deep breaths.  2. Abd varices on CT: hepatology consult appreciated, concern for noncirrhotic portal hypertension. recommending IR consult for TJ liver bx with pressure gradient measurements, planned for Mon 1/11. Will need HD after. also planned for EGD with GI early next week.  3. Low grade fever: isolated 100.1. etiology unclear. check UA. if leukocytosis continues to uptrend, may send set of blood cx as well.  4. JAK2+ PCV: admits to me in Kiswahili, she does not take her hydroxyurea. heme consult recs appreciated. resume hydroxyurea, outpatient BM bx.  5. ESRD on HD: on M/W/F schedule. follows with Dr. De León. nephro on board for her maintenance HD. will need to coordinate HD session after IR liver bx on monday    Rest as detailed above.

## 2021-01-07 NOTE — CONSULT NOTE ADULT - SUBJECTIVE AND OBJECTIVE BOX
HEPATOLOGY INITIAL CONSULT:    HPI:  Patient is a 60yoF with Hx of HTN, pancreatic cysts, cirrhosis, ESRD on HD (MWF), JAK2+ PCV with splenomegaly presenting with abdominal pain x1 day.   At ED, vitals were normal. Significant labs included leukocytosis, lipase 68, CT A/P with increased splenomegaly, extensive upper abd varices in perigastric/perisplenic region, bibasilar interstitial/alveolar edema. CXR clear. Given morphine without pain control, followed by dilaudid, with improvement.     Hepatology was consulted for evaluation of abdominal pain. Pain has been gradually coming on over the past month. Initially, pain was remaining in LUQ abdominal area but later sharp 10/10 pain radiated to the shoulder and behind the L ear. Has been taking tylenol 500 mg x2 with frequency of 1-2 times per week. Noted no post prandial abdominal pain, no RUQ pain, no fever, no recent illnesses. Denied dizziness, headache, vision changes, pain in the extremities. Had LUQ abdominal pain, maybe radiation to the chest causing L sided chest and shoulder pain. Also pleuritic in nature and pain was present upon taking a deep breath.    Reports pain since Monday, in epigastric region with radiation to the LLQ, left flank, and left shoulder. 10/10 at worst, stabbing in nature. Also reports acid reflux symptoms and mild nausea. Denies vomiting, diarrhea, melena, hematochezia. Per patient, has not been on hydroxyurea for awhile.  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    MEDICATIONS  (STANDING):  calcium acetate 1334 milliGRAM(s) Oral three times a day with meals  epoetin filiberto-epbx (RETACRIT) Injectable 77485 Unit(s) IV Push <User Schedule>  heparin   Injectable 5000 Unit(s) SubCutaneous every 12 hours  pantoprazole    Tablet 40 milliGRAM(s) Oral before breakfast    MEDICATIONS  (PRN):  acetaminophen   Tablet .. 650 milliGRAM(s) Oral every 6 hours PRN Mild Pain (1 - 3), Moderate Pain (4 - 6)  HYDROmorphone   Tablet 4 milliGRAM(s) Oral every 4 hours PRN Severe Pain (7 - 10)      ALLERGIES:  NKDA    SOCIAL HISTORY:  - Alcohol: social drink, 1 can of beer in a month maybe  - Recreational drug use: no illicit drug use  - Smoking - used to smoke for 20 years, 3-4 cigarettes, quit in 2012  - Lives with , mother-in-law, daughter    FAMILY HISTORY:  Family history of malignant neoplasm (Grandparent)  head and neck, diabetes in father  Older sibling with hx jaundice  Younger sibling with fatty liver 2/2 alcohol use    Family history of hypertension in mother        Vital Signs Last 24 Hrs  T(C): 36.4 (07 Jan 2021 10:58), Max: 37.8 (06 Jan 2021 20:39)  T(F): 97.5 (07 Jan 2021 10:58), Max: 100.1 (06 Jan 2021 20:39)  HR: 94 (07 Jan 2021 10:58) (85 - 94)  BP: 110/75 (07 Jan 2021 10:58) (110/70 - 138/87)  BP(mean): --  RR: 18 (07 Jan 2021 10:58) (18 - 18)  SpO2: 93% (07 Jan 2021 10:58) (92% - 97%)    PHYSICAL EXAM:  Constitutional: no acute distress  Eyes: minimal icterus  Neck: no masses, no LAD  Respiratory: normal inspiratory effort; no wheezing or crackles  Cardiovascular: RRR, normal S1/S2, no murmurs/rubs/gallops  Gastrointestinal: soft, nondistended,  +BS, LLU sharp pain upon palpation, no pain in other quadrants, no hepatomegaly  Extremities: no LE edema  Neurological: AAOx3, no asterixis  Skin: no rashes, bruises, petechiae; darker toned skin and difficulty assessing for jaundice    LABS:                        10.2   14.91 )-----------( 228      ( 07 Jan 2021 06:49 )             33.9     01-07    130<L>  |  90<L>  |  22  ----------------------------<  124<H>  4.5   |  27  |  4.77<H>    Ca    9.0      07 Jan 2021 06:47  Phos  4.4     01-07  Mg     2.2     01-07    TPro  7.8  /  Alb  4.2  /  TBili  5.6<H>  /  DBili  0.6<H>  /  AST  15  /  ALT  11  /  AlkPhos  133<H>  01-07    PT/INR - ( 07 Jan 2021 06:48 )   PT: 14.7 sec;   INR: 1.24 ratio         PTT - ( 07 Jan 2021 06:48 )  PTT:34.7 sec  LIVER FUNCTIONS - ( 07 Jan 2021 06:47 )  Alb: 4.2 g/dL / Pro: 7.8 g/dL / ALK PHOS: 133 U/L / ALT: 11 U/L / AST: 15 U/L / GGT: x             RADIOLOGY & ADDITIONAL STUDIES:   HEPATOLOGY INITIAL CONSULT:    HPI:  Patient is a 60yoF with Hx of HTN, pancreatic cysts, ?cirrhosis, ESRD on HD (MWF), JAK2+ PCV with splenomegaly presenting with abdominal pain x1 day.   At ED, vitals were normal. Significant labs included leukocytosis, lipase 68, CT A/P with increased splenomegaly, extensive upper abd varices in perigastric/perisplenic region, bibasilar interstitial/alveolar edema. CXR clear. Given morphine without pain control, followed by dilaudid, with improvement.     Hepatology was consulted for evaluation of abdominal pain. Pain has been gradually coming on over the past month. Initially, pain was remaining in LUQ abdominal area but later sharp 10/10 pain radiated to the shoulder and behind the L ear. Has been taking tylenol 500 mg x2 with frequency of 1-2 times per week. Noted no post prandial abdominal pain, no RUQ pain, no fever, no recent illnesses. Denied dizziness, headache, vision changes, pain in the extremities. Had LUQ abdominal pain, maybe radiation to the chest causing L sided chest and shoulder pain. Also pleuritic in nature and pain was present upon taking a deep breath.    Reports pain since Monday, in epigastric region with radiation to the LLQ, left flank, and left shoulder. 10/10 at worst, stabbing in nature. Also reports acid reflux symptoms and mild nausea. Denies vomiting, diarrhea, melena, hematochezia. Per patient, has not been on hydroxyurea for awhile.  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    MEDICATIONS  (STANDING):  calcium acetate 1334 milliGRAM(s) Oral three times a day with meals  epoetin filiberto-epbx (RETACRIT) Injectable 74046 Unit(s) IV Push <User Schedule>  heparin   Injectable 5000 Unit(s) SubCutaneous every 12 hours  pantoprazole    Tablet 40 milliGRAM(s) Oral before breakfast    MEDICATIONS  (PRN):  acetaminophen   Tablet .. 650 milliGRAM(s) Oral every 6 hours PRN Mild Pain (1 - 3), Moderate Pain (4 - 6)  HYDROmorphone   Tablet 4 milliGRAM(s) Oral every 4 hours PRN Severe Pain (7 - 10)      ALLERGIES:  NKDA    SOCIAL HISTORY:  - Alcohol: social drink, 1 can of beer in a month maybe  - Recreational drug use: no illicit drug use  - Smoking - used to smoke for 20 years, 3-4 cigarettes, quit in 2012  - Lives with , mother-in-law, daughter    FAMILY HISTORY:  Family history of malignant neoplasm (Grandparent)  head and neck, diabetes in father  Older sibling with hx jaundice  Younger sibling with fatty liver 2/2 alcohol use    Family history of hypertension in mother        Vital Signs Last 24 Hrs  T(C): 36.4 (07 Jan 2021 10:58), Max: 37.8 (06 Jan 2021 20:39)  T(F): 97.5 (07 Jan 2021 10:58), Max: 100.1 (06 Jan 2021 20:39)  HR: 94 (07 Jan 2021 10:58) (85 - 94)  BP: 110/75 (07 Jan 2021 10:58) (110/70 - 138/87)  BP(mean): --  RR: 18 (07 Jan 2021 10:58) (18 - 18)  SpO2: 93% (07 Jan 2021 10:58) (92% - 97%)    PHYSICAL EXAM:  Constitutional: no acute distress  Eyes: minimal icterus  Neck: no masses, no LAD  Respiratory: normal inspiratory effort; no wheezing or crackles  Cardiovascular: RRR, normal S1/S2, no murmurs/rubs/gallops  Gastrointestinal: soft, nondistended,  +BS, LLU sharp pain upon palpation, no pain in other quadrants, no hepatomegaly  Extremities: no LE edema  Neurological: AAOx3, no asterixis  Skin: no rashes, bruises, petechiae; darker toned skin and difficulty assessing for jaundice    LABS:                        10.2   14.91 )-----------( 228      ( 07 Jan 2021 06:49 )             33.9     01-07    130<L>  |  90<L>  |  22  ----------------------------<  124<H>  4.5   |  27  |  4.77<H>    Ca    9.0      07 Jan 2021 06:47  Phos  4.4     01-07  Mg     2.2     01-07    TPro  7.8  /  Alb  4.2  /  TBili  5.6<H>  /  DBili  0.6<H>  /  AST  15  /  ALT  11  /  AlkPhos  133<H>  01-07    PT/INR - ( 07 Jan 2021 06:48 )   PT: 14.7 sec;   INR: 1.24 ratio         PTT - ( 07 Jan 2021 06:48 )  PTT:34.7 sec  LIVER FUNCTIONS - ( 07 Jan 2021 06:47 )  Alb: 4.2 g/dL / Pro: 7.8 g/dL / ALK PHOS: 133 U/L / ALT: 11 U/L / AST: 15 U/L / GGT: x             RADIOLOGY & ADDITIONAL STUDIES:   HEPATOLOGY INITIAL CONSULT:    HPI:  Patient is a 60yoF with Hx of HTN, pancreatic cysts, cirrhosis, ESRD on HD (MWF), JAK2+ PCV with splenomegaly presenting with abdominal pain x1 day.   At ED, vitals were normal. Significant labs included leukocytosis, lipase 68, CT A/P with increased splenomegaly, extensive upper abd varices in perigastric/perisplenic region, bibasilar interstitial/alveolar edema. CXR clear. Given morphine without pain control, followed by dilaudid, with improvement.     Hepatology was consulted for evaluation of abdominal pain. Pain has been gradually coming on over the past month. Initially, pain was remaining in LUQ abdominal area but later sharp 10/10 pain radiated to the shoulder and behind the L ear. Has been taking tylenol 500 mg x2 with frequency of 1-2 times per week. Noted no post prandial abdominal pain, no RUQ pain, no fever, no recent illnesses. Denied dizziness, headache, vision changes, pain in the extremities. Had LUQ abdominal pain, maybe radiation to the chest causing L sided chest and shoulder pain. Also pleuritic in nature and pain was present upon taking a deep breath. Had 1 episode of biliary emesis. Reported 6 lb weight loss over 2 mo.    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    MEDICATIONS  (STANDING):  calcium acetate 1334 milliGRAM(s) Oral three times a day with meals  epoetin filiberto-epbx (RETACRIT) Injectable 87132 Unit(s) IV Push <User Schedule>  heparin   Injectable 5000 Unit(s) SubCutaneous every 12 hours  pantoprazole    Tablet 40 milliGRAM(s) Oral before breakfast    MEDICATIONS  (PRN):  acetaminophen   Tablet .. 650 milliGRAM(s) Oral every 6 hours PRN Mild Pain (1 - 3), Moderate Pain (4 - 6)  HYDROmorphone   Tablet 4 milliGRAM(s) Oral every 4 hours PRN Severe Pain (7 - 10)      ALLERGIES:  NKDA    SOCIAL HISTORY:  - Alcohol: social drink, 1 can of beer in a month maybe  - Recreational drug use: no illicit drug use  - Smoking - used to smoke for 20 years, 3-4 cigarettes, quit in 2012  - Lives with , mother-in-law, daughter    FAMILY HISTORY:  Family history of malignant neoplasm (Grandparent)  head and neck, diabetes in father  Older sibling with hx jaundice  Younger sibling with fatty liver 2/2 alcohol use    Family history of hypertension in mother        Vital Signs Last 24 Hrs  T(C): 36.4 (07 Jan 2021 10:58), Max: 37.8 (06 Jan 2021 20:39)  T(F): 97.5 (07 Jan 2021 10:58), Max: 100.1 (06 Jan 2021 20:39)  HR: 94 (07 Jan 2021 10:58) (85 - 94)  BP: 110/75 (07 Jan 2021 10:58) (110/70 - 138/87)  BP(mean): --  RR: 18 (07 Jan 2021 10:58) (18 - 18)  SpO2: 93% (07 Jan 2021 10:58) (92% - 97%)    PHYSICAL EXAM:  Constitutional: no acute distress  Eyes: minimal icterus  Neck: no masses, no LAD  Respiratory: normal inspiratory effort; no wheezing or crackles  Cardiovascular: RRR, normal S1/S2, no murmurs/rubs/gallops  Gastrointestinal: soft, nondistended,  +BS, LLU sharp pain upon palpation, no pain in other quadrants, no hepatomegaly  Extremities: no LE edema  Neurological: AAOx3, no asterixis  Skin: no rashes, bruises, petechiae; darker toned skin and difficulty assessing for jaundice    LABS:                        10.2   14.91 )-----------( 228      ( 07 Jan 2021 06:49 )             33.9     01-07    130<L>  |  90<L>  |  22  ----------------------------<  124<H>  4.5   |  27  |  4.77<H>    Ca    9.0      07 Jan 2021 06:47  Phos  4.4     01-07  Mg     2.2     01-07    TPro  7.8  /  Alb  4.2  /  TBili  5.6<H>  /  DBili  0.6<H>  /  AST  15  /  ALT  11  /  AlkPhos  133<H>  01-07    PT/INR - ( 07 Jan 2021 06:48 )   PT: 14.7 sec;   INR: 1.24 ratio         PTT - ( 07 Jan 2021 06:48 )  PTT:34.7 sec  LIVER FUNCTIONS - ( 07 Jan 2021 06:47 )  Alb: 4.2 g/dL / Pro: 7.8 g/dL / ALK PHOS: 133 U/L / ALT: 11 U/L / AST: 15 U/L / GGT: x             RADIOLOGY & ADDITIONAL STUDIES:    < from: NM SPECT/CT Liver Scan, Single Area Single Day (01.07.21 @ 11:09) >  FINDINGS: The liver and spleen are normal in size and position.  The liver measures 20.1 cm in longest vertical dimension (normal up to 17 cm).  The spleen measures 21.8 cm in longest dimension (normal up to 14 cm).  There is uniform distribution of tracer in the liver and an heterogeneous tracer distribution in the spleen.  There are no focal defects. There is no evidence of colloid shift to spleen or marrow.    IMPRESSION: Liver spleen scan demonstrates:    Evidence of hepatosplenomegaly.    No evidence of colloid shift to spleen or marrow.    No evidence of splenic infarct.    JIMI CONNOLLY MD; Attending Nuclear Medicine  This document has been electronically signed. Jan 7 2021  1:13PM    < end of copied text >      < from: CT Abdomen and Pelvis w/ IV Cont (01.05.21 @ 20:59) >  FINDINGS:    LOWER CHEST: Bibasilar groundglass opacities and interstitial thickening may reflect alveolar and interstitial pulmonary edema. Heart is mildly enlarged. No pleural effusion.    LIVER: Within normal limits.  BILE DUCTS: Normal caliber.  GALLBLADDER: Within normal limits.  SPLEEN: Markedly enlarged spleen measuring up to 21 cm, increased since prior.  PANCREAS: Within normal limits.  ADRENALS: Within normal limits.    KIDNEYS/URETERS: Bilaterally atrophic kidneys. Bilateral renal hypodensities too small to characterize. No hydronephrosis.  BLADDER: Within normal limits.  REPRODUCTIVE ORGANS: Uterus and adnexa are unremarkable.    BOWEL: No bowel obstruction. Appendix is normal.  PERITONEUM: No free air.  VESSELS: Extensive upper abdominal varices, notably in the perigastric and splenic gastric region.  RETROPERITONEUM/LYMPH NODES: No lymphadenopathy.  ABDOMINAL WALL: Tiny fat-containing umbilical hernia.  BONES: Mild degenerative changes of the spine.    IMPRESSION:    Marked splenomegaly, increased since prior CT of 3/5/2019.  Extensive upper abdominal varices, notably in the perigastric and perisplenic region.    Bibasilar interstitial and alveolar edema.      JANET BEACH MD; Resident Radiology  This document has been electronically signed.  SANCHEZ BESS MD; Attending Radiologist  This document has been electronically signed. Jan 5 2021 10:49PM    < end of copied text >    < from: US Abdomen Doppler (01.06.21 @ 13:52) >  *** ADDENDUM 01/07/2021  ***    Addendum: Polycythemia vera. Splenomegaly. Large splenic vein. Known abdominal varices. Acute abdominal pain. Evaluate for venous thrombosis.    *** END OF ADDENDUM 01/07/2021  ***      IMPRESSION:    No evidence of vascular thrombosis.    Extensive abdominal and splenic varices.    Splenomegaly.        ***Please see the addendum at the top of this report. It may contain additional important information or changes.****      HEMANTH BOWEN MD, Resident Radiology  This document has been electronically signed.  SHARITA FLORES MD; Attending Radiologist  This document has been electronically signed. Jan 6 2021  4:49PM  Addend:SHARITA FLORES MD; Attending Radiologist  This addendum was electronically signed on: Jan 7 2021 11:20AM.    < end of copied text >

## 2021-01-07 NOTE — CONSULT NOTE ADULT - ASSESSMENT
60yoF with Hx of HTN, pancreatic cysts, cirrhosis, ESRD on HD (MWF), JAK2+ PCV with splenomegaly presenting with abdominal pain of unknown etiology.    Impression:  #Abdominal pain of unknown etiology. Currently ruled out splenic infarct/thrombosis on imaging, but has marked 20 cm splenomegaly which is new finding. Given hx of PCV and splenomegaly, could have increased abdominal pressure caused by possible ?noncirrhotic portal hypertension vs ?neoplastic transformation of the liver and would benefit from liver bx with transjugular pressure.  #PCV JAK2 +, non adherent to hydrea therapy. HemOnc was consulted.  #ESRD on HD (MWF). Nephrology was consulted.    Recommendations:  - Would consult IR for transjugular liver bx  - Would get iron panel, MMA, and folic acid  - Plan for endoscopy possibly early next week given pt on HD   - PCV per HemOnc  - ESRD on HD per Nephro  - Rest of the care per primary team    Vicenta Guo, PGY1  Hepatology  Pager: [phone]  Available on [employer] Teams     60yoF with Hx of HTN, pancreatic cysts, cirrhosis, ESRD on HD (MWF), JAK2+ PCV with splenomegaly presenting with abdominal pain of unknown etiology.    Impression:  #Abdominal pain of unknown etiology. Found to have abdominal varices on CT. Currently ruled out splenic infarct/thrombosis on imaging, but has marked 20 cm splenomegaly which is new finding. Given hx of PCV and splenomegaly, could have increased abdominal pressure caused by possible ?noncirrhotic portal hypertension vs ?neoplastic transformation of the liver and would benefit from liver bx with transjugular pressure.  #PCV JAK2 +, non adherent to hydrea therapy. HemOnc was consulted.  #ESRD on HD (MWF). Nephrology was consulted.    Recommendations:  - Would consult IR for transjugular liver bx  - Would get iron panel, MMA, and folic acid  - Plan for endoscopy possibly early next week given pt on HD   - PCV per HemOnc  - ESRD on HD per Nephro  - Rest of the care per primary team    Vicenta Guo, PGY1  Hepatology  Pager: [phone]  Available on [employer] Teams     60yoF with Hx of HTN, pancreatic cysts, cirrhosis, ESRD on HD (MWF), JAK2+ PCV with splenomegaly presenting with abdominal pain of unknown etiology.    Impression:  #Abdominal pain of unknown etiology. Found to have abdominal varices on CT. Currently ruled out splenic infarct/thrombosis on imaging, but has marked 20 cm splenomegaly which is new finding. Given hx of PCV and splenomegaly, could have increased abdominal pressure caused by possible ?noncirrhotic portal hypertension vs ?neoplastic transformation of the liver and would benefit from liver bx with transjugular pressure.  #PCV JAK2 +, non adherent to hydrea therapy. HemOnc was consulted.  #ESRD on HD (MWF). Nephrology was consulted.    Recommendations:  - Would consult IR for transjugular liver bx and send for reticulin stain to rule in nodular regenerative hyperplasia.  - Would get iron panel, MMA, and folic acid  - Plan for endoscopy possibly early next week given pt on HD   - PCV per HemOnc  - ESRD on HD per Nephro  - Rest of the care per primary team    Vicenta Guo, PGY1  Hepatology  Pager: [phone]  Available on [employer] Teams

## 2021-01-07 NOTE — PROGRESS NOTE ADULT - PROBLEM SELECTOR PLAN 5
-not currently on therapy  -heme consult as above t bili 5.6, d bili 0.6, , haptoglobin 22, retic 4.2   consistent with hemolytic anemia  - heme following, [place]

## 2021-01-07 NOTE — PROGRESS NOTE ADULT - ASSESSMENT
Patient is a 59yoF with ESRD on HD, Hx of splenomegaly and splenic infarct 2/2 to PCV, HTN, pancreatic cysts here with abdominal pain x1 day. CT with increased splenomegaly, but otherwise without acute finding.  Patient is a 59yoF with ESRD on HD, Hx of splenomegaly and splenic infarct 2/2 to PCV, HTN, pancreatic cysts here with abdominal pain x1 day. CT with increased splenomegaly, but otherwise without acute finding.

## 2021-01-07 NOTE — PROGRESS NOTE ADULT - SUBJECTIVE AND OBJECTIVE BOX
PROGRESS NOTE:     CONTACT INFO:  Lachelle Bautista MD  Internal Medicine PGY2  Pager: [phone]    Patient is a 60y old  Female who presents with a chief complaint of Abdominal pain (06 Jan 2021 15:40)      SUBJECTIVE / OVERNIGHT EVENTS:  No acute events overnight. Patient seen and evaluated at bedside. No fever/chills.  Denies SOB at rest, chest pain, palpitations, abdominal pain, nausea/vomiting    ADDITIONAL REVIEW OF SYSTEMS:    MEDICATIONS  (STANDING):  calcium acetate 1334 milliGRAM(s) Oral three times a day with meals  epoetin filiberto-epbx (RETACRIT) Injectable 39851 Unit(s) IV Push <User Schedule>  heparin   Injectable 5000 Unit(s) SubCutaneous every 12 hours  pantoprazole    Tablet 40 milliGRAM(s) Oral before breakfast    MEDICATIONS  (PRN):  acetaminophen   Tablet .. 650 milliGRAM(s) Oral every 6 hours PRN Mild Pain (1 - 3), Moderate Pain (4 - 6)  HYDROmorphone   Tablet 4 milliGRAM(s) Oral every 4 hours PRN Severe Pain (7 - 10)      CAPILLARY BLOOD GLUCOSE        I&O's Summary    06 Jan 2021 07:01  -  07 Jan 2021 07:00  --------------------------------------------------------  IN: 30 mL / OUT: 1130 mL / NET: -1100 mL        PHYSICAL EXAM:  Vital Signs Last 24 Hrs  T(C): 37.1 (07 Jan 2021 04:36), Max: 37.8 (06 Jan 2021 20:39)  T(F): 98.7 (07 Jan 2021 04:36), Max: 100.1 (06 Jan 2021 20:39)  HR: 93 (07 Jan 2021 04:36) (85 - 93)  BP: 118/74 (07 Jan 2021 04:36) (110/70 - 138/87)  BP(mean): --  RR: 18 (07 Jan 2021 04:36) (18 - 18)  SpO2: 92% (07 Jan 2021 04:36) (92% - 97%)    CONSTITUTIONAL: NAD, well-developed  RESPIRATORY: Normal respiratory effort; lungs are clear to auscultation bilaterally  CARDIOVASCULAR: Regular rate and rhythm, normal S1 and S2, no murmur/rub/gallop; No lower extremity edema; Peripheral pulses are 2+ bilaterally  ABDOMEN: Nontender to palpation, normoactive bowel sounds, no rebound/guarding; No hepatosplenomegaly  MUSCLOSKELETAL: no clubbing or cyanosis of digits; no joint swelling or tenderness to palpation  PSYCH: A+O to person, place, and time; affect appropriate    LABS:                        10.1   14.49 )-----------( 220      ( 05 Jan 2021 16:55 )             33.8     01-05    136  |  93<L>  |  28<H>  ----------------------------<  98  5.1   |  25  |  5.07<H>    Ca    9.0      05 Jan 2021 16:55    TPro  8.0  /  Alb  4.5  /  TBili  4.5<H>  /  DBili  x   /  AST  33  /  ALT  12  /  AlkPhos  132<H>  01-05    PT/INR - ( 05 Jan 2021 19:59 )   PT: 14.4 sec;   INR: 1.21 ratio         PTT - ( 05 Jan 2021 19:59 )  PTT:36.8 sec            RADIOLOGY & ADDITIONAL TESTS:  Results Reviewed:   Imaging Personally Reviewed:  Electrocardiogram Personally Reviewed:    COORDINATION OF CARE:  Care Discussed with Consultants/Other Providers [Y/N]:  Prior or Outpatient Records Reviewed [Y/N]:   PROGRESS NOTE:     CONTACT INFO:  Lachelle Bautista MD  Internal Medicine PGY2  Pager: [phone]    Patient is a 60y old  Female who presents with a chief complaint of Abdominal pain (06 Jan 2021 15:40)      SUBJECTIVE / OVERNIGHT EVENTS:  No acute events overnight. Patient seen and evaluated at bedside. No fever/chills.  Denies SOB at rest, chest pain, palpitations, abdominal pain, nausea/vomiting    ADDITIONAL REVIEW OF SYSTEMS:    MEDICATIONS  (STANDING):  calcium acetate 1334 milliGRAM(s) Oral three times a day with meals  epoetin filiberto-epbx (RETACRIT) Injectable 76094 Unit(s) IV Push <User Schedule>  heparin   Injectable 5000 Unit(s) SubCutaneous every 12 hours  pantoprazole    Tablet 40 milliGRAM(s) Oral before breakfast    MEDICATIONS  (PRN):  acetaminophen   Tablet .. 650 milliGRAM(s) Oral every 6 hours PRN Mild Pain (1 - 3), Moderate Pain (4 - 6)  HYDROmorphone   Tablet 4 milliGRAM(s) Oral every 4 hours PRN Severe Pain (7 - 10)      CAPILLARY BLOOD GLUCOSE        I&O's Summary    06 Jan 2021 07:01  -  07 Jan 2021 07:00  --------------------------------------------------------  IN: 30 mL / OUT: 1130 mL / NET: -1100 mL        PHYSICAL EXAM:  Vital Signs Last 24 Hrs  T(C): 37.1 (07 Jan 2021 04:36), Max: 37.8 (06 Jan 2021 20:39)  T(F): 98.7 (07 Jan 2021 04:36), Max: 100.1 (06 Jan 2021 20:39)  HR: 93 (07 Jan 2021 04:36) (85 - 93)  BP: 118/74 (07 Jan 2021 04:36) (110/70 - 138/87)  BP(mean): --  RR: 18 (07 Jan 2021 04:36) (18 - 18)  SpO2: 92% (07 Jan 2021 04:36) (92% - 97%)    CONSTITUTIONAL: NAD, well-developed  RESPIRATORY: Normal respiratory effort; lungs are clear to auscultation bilaterally  CARDIOVASCULAR: Regular rate and rhythm, normal S1 and S2, no murmur/rub/gallop; No lower extremity edema; Peripheral pulses are 2+ bilaterally  ABDOMEN: Nontender to palpation, normoactive bowel sounds, no rebound/guarding; No hepatosplenomegaly  MUSCLOSKELETAL: no clubbing or cyanosis of digits; no joint swelling or tenderness to palpation  PSYCH: A+O to person, place, and time; affect appropriate    LABS:                        10.1   14.49 )-----------( 220      ( 05 Jan 2021 16:55 )             33.8     01-05    136  |  93<L>  |  28<H>  ----------------------------<  98  5.1   |  25  |  5.07<H>    Ca    9.0      05 Jan 2021 16:55    TPro  8.0  /  Alb  4.5  /  TBili  4.5<H>  /  DBili  x   /  AST  33  /  ALT  12  /  AlkPhos  132<H>  01-05    PT/INR - ( 05 Jan 2021 19:59 )   PT: 14.4 sec;   INR: 1.21 ratio         PTT - ( 05 Jan 2021 19:59 )  PTT:36.8 sec            RADIOLOGY & ADDITIONAL TESTS:  < from: NM Liver + Spleen Scan (01.07.21 @ 10:25) >    IMPRESSION: Liver spleen scan demonstrates:    Evidence of hepatosplenomegaly.    No evidence of colloid shift to spleen or marrow.    No evidence of splenic infarct.    < end of copied text >    < from: US Abdomen Doppler (01.06.21 @ 13:52) >  Splenic Vein: Patent with normal direction of flow.  Main Portal Vein: Hepatopetal flow, 63 cm/s. 1.3 cm maximum diameter.  Left Portal Vein: Patent with hepatopetal flow.  Anterior Right Portal Vein: Patent with hepatopetal flow.  Posterior Right Portal Vein: Patent with hepatopetal flow.    Hepatic Artery: Normal direction of flow, peak systolic velocity 106 cm/s.    Hepatic Veins and Inferior Vena Cava: Patent with normal direction of flow.    Redemonstration of extensive abdominal and splenic hilar varices.    Splenomegaly with spleen measuring 20 cm.    IMPRESSION:    No evidence of vascular thrombosis.    Extensive abdominal and splenic varices.    Splenomegaly.    < end of copied text >      COORDINATION OF CARE:  Care Discussed with Consultants/Other Providers [Y/N]:  Prior or Outpatient Records Reviewed [Y/N]:   PROGRESS NOTE:     CONTACT INFO:  Lachelle Bautista MD  Internal Medicine PGY2  Pager: [phone]    Patient is a 60y old  Female who presents with a chief complaint of Abdominal pain (06 Jan 2021 15:40)      SUBJECTIVE / OVERNIGHT EVENTS:  No acute adverse event overnight. Unable to see patient in the morning as patient was in NM test. Saw patient at noon, patient had 1 episode of bilious vomiting, had epigastric abdominal pain, denies chest pain/shortness of breath. Passed flatus last night, last BM on Tuesday     ADDITIONAL REVIEW OF SYSTEMS:    MEDICATIONS  (STANDING):  calcium acetate 1334 milliGRAM(s) Oral three times a day with meals  epoetin filiberto-epbx (RETACRIT) Injectable 18850 Unit(s) IV Push <User Schedule>  heparin   Injectable 5000 Unit(s) SubCutaneous every 12 hours  pantoprazole    Tablet 40 milliGRAM(s) Oral before breakfast    MEDICATIONS  (PRN):  acetaminophen   Tablet .. 650 milliGRAM(s) Oral every 6 hours PRN Mild Pain (1 - 3), Moderate Pain (4 - 6)  HYDROmorphone   Tablet 4 milliGRAM(s) Oral every 4 hours PRN Severe Pain (7 - 10)      CAPILLARY BLOOD GLUCOSE        I&O's Summary    06 Jan 2021 07:01  -  07 Jan 2021 07:00  --------------------------------------------------------  IN: 30 mL / OUT: 1130 mL / NET: -1100 mL        PHYSICAL EXAM:  Vital Signs Last 24 Hrs  T(C): 37.1 (07 Jan 2021 04:36), Max: 37.8 (06 Jan 2021 20:39)  T(F): 98.7 (07 Jan 2021 04:36), Max: 100.1 (06 Jan 2021 20:39)  HR: 93 (07 Jan 2021 04:36) (85 - 93)  BP: 118/74 (07 Jan 2021 04:36) (110/70 - 138/87)  BP(mean): --  RR: 18 (07 Jan 2021 04:36) (18 - 18)  SpO2: 92% (07 Jan 2021 04:36) (92% - 97%)    CONSTITUTIONAL: NAD, well-developed  RESPIRATORY: Normal respiratory effort; lungs are clear to auscultation bilaterally  CARDIOVASCULAR: Regular rate and rhythm, normal S1 and S2, no murmur/rub/gallop; No lower extremity edema.   ABDOMEN: Epigastric and LUQ tender to palpation, normal active bowel sounds,   MUSCLOSKELETAL: no clubbing or cyanosis of digits; no joint swelling or tenderness to palpation  PSYCH: A+O to person, place, and time; affect appropriate                   LABS:              10.2   14.91 )-----------( 228      ( 07 Jan 2021 06:49 )             33.9     Hgb Trend: 10.2<--, 10.1<--  01-07    130<L>  |  90<L>  |  22  ----------------------------<  124<H>  4.5   |  27  |  4.77<H>    Ca    9.0      07 Jan 2021 06:47  Phos  4.4     01-07  Mg     2.2     01-07    TPro  7.8  /  Alb  4.2  /  TBili  5.6<H>  /  DBili  0.6<H>  /  AST  15  /  ALT  11  /  AlkPhos  133<H>  01-07    Creatinine Trend: 4.77<--, 5.07<--  PT/INR - ( 07 Jan 2021 06:48 )   PT: 14.7 sec;   INR: 1.24 ratio         PTT - ( 07 Jan 2021 06:48 )  PTT:34.7 sec                           RADIOLOGY & ADDITIONAL TESTS:  < from: NM Liver + Spleen Scan (01.07.21 @ 10:25) >    IMPRESSION: Liver spleen scan demonstrates:    Evidence of hepatosplenomegaly.    No evidence of colloid shift to spleen or marrow.    No evidence of splenic infarct.    < end of copied text >    < from: US Abdomen Doppler (01.06.21 @ 13:52) >  Splenic Vein: Patent with normal direction of flow.  Main Portal Vein: Hepatopetal flow, 63 cm/s. 1.3 cm maximum diameter.  Left Portal Vein: Patent with hepatopetal flow.  Anterior Right Portal Vein: Patent with hepatopetal flow.  Posterior Right Portal Vein: Patent with hepatopetal flow.    Hepatic Artery: Normal direction of flow, peak systolic velocity 106 cm/s.    Hepatic Veins and Inferior Vena Cava: Patent with normal direction of flow.    Redemonstration of extensive abdominal and splenic hilar varices.    Splenomegaly with spleen measuring 20 cm.    IMPRESSION:    No evidence of vascular thrombosis.    Extensive abdominal and splenic varices.    Splenomegaly.    < end of copied text >      COORDINATION OF CARE:  Care Discussed with Consultants/Other Providers [Y]:  hepatology  Prior or Outpatient Records Reviewed [Y/N]:   PROGRESS NOTE:     CONTACT INFO:  Lachelle Bautista MD  Internal Medicine PGY2  Pager: [phone]    Patient is a 60y old  Female who presents with a chief complaint of Abdominal pain (06 Jan 2021 15:40)      SUBJECTIVE / OVERNIGHT EVENTS:  No acute adverse event overnight. Unable to see patient in the morning as patient was in NM test. Saw patient at noon, patient had 1 episode of bilious vomiting, had epigastric abdominal pain, denies chest pain/shortness of breath. Passed flatus last night, last BM on Tuesday     ADDITIONAL REVIEW OF SYSTEMS:    MEDICATIONS  (STANDING):  calcium acetate 1334 milliGRAM(s) Oral three times a day with meals  epoetin filiberto-epbx (RETACRIT) Injectable 29845 Unit(s) IV Push <User Schedule>  heparin   Injectable 5000 Unit(s) SubCutaneous every 12 hours  pantoprazole    Tablet 40 milliGRAM(s) Oral before breakfast    MEDICATIONS  (PRN):  acetaminophen   Tablet .. 650 milliGRAM(s) Oral every 6 hours PRN Mild Pain (1 - 3), Moderate Pain (4 - 6)  HYDROmorphone   Tablet 4 milliGRAM(s) Oral every 4 hours PRN Severe Pain (7 - 10)      CAPILLARY BLOOD GLUCOSE        I&O's Summary    06 Jan 2021 07:01  -  07 Jan 2021 07:00  --------------------------------------------------------  IN: 30 mL / OUT: 1130 mL / NET: -1100 mL        PHYSICAL EXAM:  Vital Signs Last 24 Hrs  T(C): 37.1 (07 Jan 2021 04:36), Max: 37.8 (06 Jan 2021 20:39)  T(F): 98.7 (07 Jan 2021 04:36), Max: 100.1 (06 Jan 2021 20:39)  HR: 93 (07 Jan 2021 04:36) (85 - 93)  BP: 118/74 (07 Jan 2021 04:36) (110/70 - 138/87)  BP(mean): --  RR: 18 (07 Jan 2021 04:36) (18 - 18)  SpO2: 92% (07 Jan 2021 04:36) (92% - 97%)    CONSTITUTIONAL: NAD, well-developed  RESPIRATORY: Normal respiratory effort; lungs are clear to auscultation bilaterally  CARDIOVASCULAR: Regular rate and rhythm, normal S1 and S2, no murmur/rub/gallop; No lower extremity edema.   ABDOMEN: Epigastric and LUQ tender to palpation, normal active bowel sounds,   MUSCLOSKELETAL: no clubbing or cyanosis of digits; no joint swelling or tenderness to palpation  PSYCH: A+O to person, place, and time; affect appropriate                   LABS:              10.2   14.91 )-----------( 228      ( 07 Jan 2021 06:49 )             33.9     Hgb Trend: 10.2<--, 10.1<--  01-07    130<L>  |  90<L>  |  22  ----------------------------<  124<H>  4.5   |  27  |  4.77<H>    Ca    9.0      07 Jan 2021 06:47  Phos  4.4     01-07  Mg     2.2     01-07    TPro  7.8  /  Alb  4.2  /  TBili  5.6<H>  /  DBili  0.6<H>  /  AST  15  /  ALT  11  /  AlkPhos  133<H>  01-07    Creatinine Trend: 4.77<--, 5.07<--  PT/INR - ( 07 Jan 2021 06:48 )   PT: 14.7 sec;   INR: 1.24 ratio         PTT - ( 07 Jan 2021 06:48 )  PTT:34.7 sec                           RADIOLOGY & ADDITIONAL TESTS:  < from: NM Liver + Spleen Scan (01.07.21 @ 10:25) >    IMPRESSION: Liver spleen scan demonstrates:    Evidence of hepatosplenomegaly.    No evidence of colloid shift to spleen or marrow.    No evidence of splenic infarct.    < end of copied text >    < from: US Abdomen Doppler (01.06.21 @ 13:52) >  Splenic Vein: Patent with normal direction of flow.  Main Portal Vein: Hepatopetal flow, 63 cm/s. 1.3 cm maximum diameter.  Left Portal Vein: Patent with hepatopetal flow.  Anterior Right Portal Vein: Patent with hepatopetal flow.  Posterior Right Portal Vein: Patent with hepatopetal flow.    Hepatic Artery: Normal direction of flow, peak systolic velocity 106 cm/s.    Hepatic Veins and Inferior Vena Cava: Patent with normal direction of flow.    Redemonstration of extensive abdominal and splenic hilar varices.    Splenomegaly with spleen measuring 20 cm.    IMPRESSION:    No evidence of vascular thrombosis.    Extensive abdominal and splenic varices.    Splenomegaly.    < end of copied text >      COORDINATION OF CARE:  Care Discussed with Consultants/Other Providers [Y]:  hepatology  Prior or Outpatient Records Reviewed [Y/N]:

## 2021-01-07 NOTE — PROGRESS NOTE ADULT - PROBLEM SELECTOR PLAN 2
-increased on CT from prior; no new infarcts noted   - concern for splenic infarct   -Ddx and plan as above One episode of bilious vomiting today   s/p upright chest xray, abdominal xray, negative for acute intraabdominal pathology  - c/w clear liquid diet   - zofran PRN

## 2021-01-07 NOTE — PROGRESS NOTE ADULT - PROBLEM SELECTOR PLAN 6
DVT px: hep subc  Diet: Clear renal diet       Full Code - [facility]  -MWF dialysis, last on Monday (1/4) outpatient  -s/p nephro consult, HD M/W/F while inpatient (s/p HD 1/6)

## 2021-01-07 NOTE — PROGRESS NOTE ADULT - PROBLEM SELECTOR PLAN 7
Dispo:   1.  Name of PCP:   2.  PCP Contacted on Admission: [ ] Y    [ ] N    3.  PCP contacted at Discharge: [ ] Y    [ ] N    [ ] N/A  4.  Post-Discharge Appointment Date and Location:  5.  Summary of Handoff given to PCP: -cont PPI

## 2021-01-07 NOTE — CONSULT NOTE ADULT - SUBJECTIVE AND OBJECTIVE BOX
Vascular & Interventional Radiology Brief Consult Note    Evaluate for Procedure: Transjugular liver biopsy with pressure measurement.    HPI: 61 y/o F with Hx of HTN, pancreatic cysts, ESRD on HD (MWF), JAK2+ PCV with splenomegaly here for 1 month of worsening LUQ abdominal pain w/ tender splenomegaly. Imaging with extensive abdominal varices and splenomegaly.     Allergies:   Medications (Abx/Cardiac/Anticoagulation/Blood Products)  heparin   Injectable: 5000 Unit(s) SubCutaneous (01-07 @ 05:42)    Data:    T(C): 36.9  HR: 84  BP: 124/82  RR: 16  SpO2: 99%    -WBC 14.91 / HgB 10.2 / Hct 33.9 / Plt 228  -Na 130 / Cl 90 / BUN 22 / Glucose 124  -K 4.5 / CO2 27 / Cr 4.77  -ALT 11 / Alk Phos 133 / T.Bili 5.6  -INR 1.24 / PTT 34.7    Imaging:  NM Spect Liver Spleen CT 1/7/21  IMPRESSION: Liver spleen scan demonstrates:    Evidence of hepatosplenomegaly.    No evidence of colloid shift to spleen or marrow.    No evidence of splenic infarct.     US Abdomen Duplex 1/6/21   IMPRESSION:    No evidence of vascular thrombosis.    Extensive abdominal and splenic varices.    Splenomegaly.     CT Abdomen and Pelvis 1/5/21   IMPRESSION:    Marked splenomegaly, increased since prior CT of 3/5/2019.   Extensive upper abdominal varices, notably in the perigastric and perisplenic region.    Bibasilar interstitial and alveolar edema.     Assessment/Plan:   61 y/o F with Hx of HTN, pancreatic cysts, ESRD on HD (MWF), JAK2+ PCV with splenomegaly here for 1 month of worsening LUQ abdominal pain w/ tender splenomegaly. Imaging with extensive abdominal varices and splenomegaly. IR consulted for transjugular liver biopsy with pressure gradient measurements.     - Plan for transjugular liver biopsy with pressure gradient measurement for 1/11/21.   - Will need coordination with hemodialysis as patient will receive intraprocedural contrast.   - NPO at midnight on 1/10/21   - Hold DVT PPx AM of procedure (1/11/21).   - AM CBC, BMP, Coags on day of procedure.   - Please place IR procedure order under Dr. Perez.   - Discussed with Dr. Perez.

## 2021-01-07 NOTE — CONSULT NOTE ADULT - ATTENDING COMMENTS
61 y/o F with Hx of HTN, pancreatic cysts, ESRD on HD (MWF), JAK2+ PCV with splenomegaly here for 1 month of worsening LUQ abdominal pain w/ tender splenomegaly.  Liver function remains intact but has extensive abdominal varices.  Suspect that she has non-cirrhotic portal hypertension which is related to PCV. Please obtain transjugular liver biopsy w/ pressure gradient measurements to make the diagnosis. Will need to send for reticulin stain to rule in nodular regenerative hyperplasia.  Eventually if splenomegaly and pain is a issue, can consider embolization.  Also will need a EGD, perhaps early next week.
60yoF with Hx of HTN, ESRD on HD (MWF), JAK2+ PCV with splenomegaly presenting with abdominal pain likely due to splenomegaly. Unclear etiology of splenomegaly. No splenic infarcts seen on imaging. Patient with low hemoglobin and likely noncompliant with hydrea. Has not follow up with Dr. Guo in around a year, likely will require repeat outpatient bone marrow biopsy.
ESRD on HD-HD MWF; plan HD today  epigastric pain- ongoing hx GERD/epigastric pain/early satiety/weight loss- GI eval; may need EGD  splenomegaly  Anemia due to CKD- monitor Hb; on EPO 10k tiw with HD; continue; on Venofer 50mg weekly as outpatient  Secondary hyperpth renal origin- on hectorol 1.5mcg with hd  hyperphosphatemia-on phoslo- with meals; continue

## 2021-01-07 NOTE — PROGRESS NOTE ADULT - PROBLEM SELECTOR PLAN 3
- [facility]  -MWF dialysis, last on Monday outpatient  -s/p nephro consult, plan for HD today -s/p liver consult, concern for non-cirrhotic portal hypertension which is related to PCV.   -s/p IR consult, plan for transjugular liver biopsy w/ pressure gradient measurements on Monday (1/11), need to coordinate with dialysis, Dialysis need to happen after IR biopsy (as contrast load will be given during biopsy)   - With IR biopsy, will need to send for reticulin stain to rule in nodular regenerative hyperplasia.

## 2021-01-07 NOTE — PROGRESS NOTE ADULT - PROBLEM SELECTOR PLAN 4
-cont PPI -increased on CT from prior; no new infarcts noted   - concern for splenic infarct   -Ddx and plan as above

## 2021-01-07 NOTE — CONSULT NOTE ADULT - CONSULT REASON
JAK2 PV
TJ liver biopsy with pressure measurement for noncirrhotic portal hypertension workup
Evaluation of abdominal pain, elevated alkphos
ESRD on HD

## 2021-01-07 NOTE — PROGRESS NOTE ADULT - PROBLEM SELECTOR PLAN 1
Concern for splenic infarct   - s/p hematology consult, f/u recs  - f/u NM Liver + spleen scan   - f/u US abdomen doppler   -dilaudid 1mg IV q4h PRN for now, transition to PO if patient tolerates diet  -clear liquid diet US abdomen doppler: no evidence of vascular thrombosis   NM Liver + spleen scan: hepatosplenomegaly, no evidence of splenic infarct   Differential: noncirrhotic portal hypertension related to PCV   - s/p hepatology consult ,   - s/p hematology consult, f/u recs  -c/w dilaudid 1mg IV q4h PRN for severe pain, as poor PO tolerance   -clear liquid diet

## 2021-01-07 NOTE — PROGRESS NOTE ADULT - PROBLEM SELECTOR PLAN 9
DVT px: hep subc  Diet: Clear renal diet   Discharge: pending medical improvement. Of note patient undocumented, with no medical insurance, need to coordinate with [facility] and ensure medication affordable.     Full Code

## 2021-01-08 ENCOUNTER — NON-APPOINTMENT (OUTPATIENT)
Age: 61
End: 2021-01-08

## 2021-01-08 LAB
ALBUMIN SERPL ELPH-MCNC: 4.2 G/DL — SIGNIFICANT CHANGE UP (ref 3.3–5)
ALP SERPL-CCNC: 134 U/L — HIGH (ref 40–120)
ALT FLD-CCNC: 8 U/L — LOW (ref 10–45)
ANION GAP SERPL CALC-SCNC: 19 MMOL/L — HIGH (ref 5–17)
AST SERPL-CCNC: 17 U/L — SIGNIFICANT CHANGE UP (ref 10–40)
BASOPHILS # BLD AUTO: 0.15 K/UL — SIGNIFICANT CHANGE UP (ref 0–0.2)
BASOPHILS NFR BLD AUTO: 1.1 % — SIGNIFICANT CHANGE UP (ref 0–2)
BILIRUB SERPL-MCNC: 6 MG/DL — HIGH (ref 0.2–1.2)
BUN SERPL-MCNC: 43 MG/DL — HIGH (ref 7–23)
CALCIUM SERPL-MCNC: 8.5 MG/DL — SIGNIFICANT CHANGE UP (ref 8.4–10.5)
CHLORIDE SERPL-SCNC: 88 MMOL/L — LOW (ref 96–108)
CMV DNA CSF QL NAA+PROBE: SIGNIFICANT CHANGE UP
CO2 SERPL-SCNC: 25 MMOL/L — SIGNIFICANT CHANGE UP (ref 22–31)
CREAT SERPL-MCNC: 7.28 MG/DL — HIGH (ref 0.5–1.3)
EOSINOPHIL # BLD AUTO: 0.26 K/UL — SIGNIFICANT CHANGE UP (ref 0–0.5)
EOSINOPHIL NFR BLD AUTO: 1.9 % — SIGNIFICANT CHANGE UP (ref 0–6)
GLUCOSE SERPL-MCNC: 126 MG/DL — HIGH (ref 70–99)
HCT VFR BLD CALC: 32.9 % — LOW (ref 34.5–45)
HGB BLD-MCNC: 10.1 G/DL — LOW (ref 11.5–15.5)
IMM GRANULOCYTES NFR BLD AUTO: 3.7 % — HIGH (ref 0–1.5)
LYMPHOCYTES # BLD AUTO: 1.02 K/UL — SIGNIFICANT CHANGE UP (ref 1–3.3)
LYMPHOCYTES # BLD AUTO: 7.4 % — LOW (ref 13–44)
MAGNESIUM SERPL-MCNC: 2.4 MG/DL — SIGNIFICANT CHANGE UP (ref 1.6–2.6)
MCHC RBC-ENTMCNC: 28.6 PG — SIGNIFICANT CHANGE UP (ref 27–34)
MCHC RBC-ENTMCNC: 30.7 GM/DL — LOW (ref 32–36)
MCV RBC AUTO: 93.2 FL — SIGNIFICANT CHANGE UP (ref 80–100)
MONOCYTES # BLD AUTO: 0.7 K/UL — SIGNIFICANT CHANGE UP (ref 0–0.9)
MONOCYTES NFR BLD AUTO: 5 % — SIGNIFICANT CHANGE UP (ref 2–14)
NEUTROPHILS # BLD AUTO: 11.22 K/UL — HIGH (ref 1.8–7.4)
NEUTROPHILS NFR BLD AUTO: 80.9 % — HIGH (ref 43–77)
NRBC # BLD: 0 /100 WBCS — SIGNIFICANT CHANGE UP (ref 0–0)
OSMOLALITY SERPL: 295 MOSMOL/KG — SIGNIFICANT CHANGE UP (ref 275–300)
PHOSPHATE SERPL-MCNC: 6.3 MG/DL — HIGH (ref 2.5–4.5)
PLATELET # BLD AUTO: 230 K/UL — SIGNIFICANT CHANGE UP (ref 150–400)
POTASSIUM SERPL-MCNC: 4.7 MMOL/L — SIGNIFICANT CHANGE UP (ref 3.5–5.3)
POTASSIUM SERPL-SCNC: 4.7 MMOL/L — SIGNIFICANT CHANGE UP (ref 3.5–5.3)
PROT SERPL-MCNC: 7.9 G/DL — SIGNIFICANT CHANGE UP (ref 6–8.3)
RBC # BLD: 3.53 M/UL — LOW (ref 3.8–5.2)
RBC # FLD: 20.7 % — HIGH (ref 10.3–14.5)
SODIUM SERPL-SCNC: 132 MMOL/L — LOW (ref 135–145)
WBC # BLD: 13.87 K/UL — HIGH (ref 3.8–10.5)
WBC # FLD AUTO: 13.87 K/UL — HIGH (ref 3.8–10.5)

## 2021-01-08 PROCEDURE — 99233 SBSQ HOSP IP/OBS HIGH 50: CPT | Mod: GC

## 2021-01-08 PROCEDURE — 99232 SBSQ HOSP IP/OBS MODERATE 35: CPT | Mod: GC

## 2021-01-08 RX ORDER — HYDROMORPHONE HYDROCHLORIDE 2 MG/ML
4 INJECTION INTRAMUSCULAR; INTRAVENOUS; SUBCUTANEOUS EVERY 6 HOURS
Refills: 0 | Status: DISCONTINUED | OUTPATIENT
Start: 2021-01-08 | End: 2021-01-13

## 2021-01-08 RX ORDER — ONDANSETRON 8 MG/1
4 TABLET, FILM COATED ORAL ONCE
Refills: 0 | Status: COMPLETED | OUTPATIENT
Start: 2021-01-08 | End: 2021-01-08

## 2021-01-08 RX ADMIN — HEPARIN SODIUM 5000 UNIT(S): 5000 INJECTION INTRAVENOUS; SUBCUTANEOUS at 18:27

## 2021-01-08 RX ADMIN — ERYTHROPOIETIN 10000 UNIT(S): 10000 INJECTION, SOLUTION INTRAVENOUS; SUBCUTANEOUS at 10:09

## 2021-01-08 RX ADMIN — HEPARIN SODIUM 5000 UNIT(S): 5000 INJECTION INTRAVENOUS; SUBCUTANEOUS at 06:34

## 2021-01-08 RX ADMIN — HYDROMORPHONE HYDROCHLORIDE 4 MILLIGRAM(S): 2 INJECTION INTRAMUSCULAR; INTRAVENOUS; SUBCUTANEOUS at 20:43

## 2021-01-08 RX ADMIN — Medication 1334 MILLIGRAM(S): at 13:19

## 2021-01-08 RX ADMIN — Medication 1334 MILLIGRAM(S): at 18:26

## 2021-01-08 RX ADMIN — ONDANSETRON 4 MILLIGRAM(S): 8 TABLET, FILM COATED ORAL at 16:14

## 2021-01-08 RX ADMIN — ONDANSETRON 4 MILLIGRAM(S): 8 TABLET, FILM COATED ORAL at 06:29

## 2021-01-08 RX ADMIN — PANTOPRAZOLE SODIUM 40 MILLIGRAM(S): 20 TABLET, DELAYED RELEASE ORAL at 06:33

## 2021-01-08 RX ADMIN — SENNA PLUS 2 TABLET(S): 8.6 TABLET ORAL at 20:44

## 2021-01-08 RX ADMIN — HYDROMORPHONE HYDROCHLORIDE 4 MILLIGRAM(S): 2 INJECTION INTRAMUSCULAR; INTRAVENOUS; SUBCUTANEOUS at 13:29

## 2021-01-08 RX ADMIN — ONDANSETRON 4 MILLIGRAM(S): 8 TABLET, FILM COATED ORAL at 18:27

## 2021-01-08 RX ADMIN — POLYETHYLENE GLYCOL 3350 17 GRAM(S): 17 POWDER, FOR SOLUTION ORAL at 13:18

## 2021-01-08 RX ADMIN — Medication 10 MILLIGRAM(S): at 16:15

## 2021-01-08 RX ADMIN — ONDANSETRON 4 MILLIGRAM(S): 8 TABLET, FILM COATED ORAL at 13:19

## 2021-01-08 NOTE — PROGRESS NOTE ADULT - PROBLEM SELECTOR PLAN 5
t bili 5.6, d bili 0.6, , haptoglobin 22, retic 4.2   consistent with hemolytic anemia  - heme following, [place]

## 2021-01-08 NOTE — PROGRESS NOTE ADULT - SUBJECTIVE AND OBJECTIVE BOX
INTERVAL HPI/OVERNIGHT EVENTS:  Patient S&E at bedside. No o/n events. Patient still notes some mild abdominal pain.     VITAL SIGNS:  T(F): 98.3 (01-08-21 @ 05:00)  HR: 84 (01-08-21 @ 05:00)  BP: 105/68 (01-08-21 @ 05:00)  RR: 18 (01-08-21 @ 05:00)  SpO2: 94% (01-08-21 @ 05:00)  Wt(kg): --    PHYSICAL EXAM:    GENERAL: NAD, AAOx3   HEAD:  NC/AT  EYES: EOMI, PERRLA, no scleral icterus  HEENT: Moist mucous membranes  LUNG: Clear to auscultation bilaterally; No rales, rhonchi, wheezing, or rubs  HEART: RRR; No murmurs, rubs, or gallops  ABDOMEN: +BS, ST/ND/NT  EXTREMITIES:  2+ Peripheral Pulses, No clubbing, cyanosis, or edema  LAD: no palpable adenopathy      MEDICATIONS  (STANDING):  calcium acetate 1334 milliGRAM(s) Oral three times a day with meals  epoetin filiberto-epbx (RETACRIT) Injectable 70749 Unit(s) IV Push <User Schedule>  heparin   Injectable 5000 Unit(s) SubCutaneous every 12 hours  ondansetron Injectable 4 milliGRAM(s) IV Push every 6 hours  pantoprazole    Tablet 40 milliGRAM(s) Oral before breakfast  polyethylene glycol 3350 17 Gram(s) Oral daily  senna 2 Tablet(s) Oral at bedtime    MEDICATIONS  (PRN):  acetaminophen   Tablet .. 650 milliGRAM(s) Oral every 6 hours PRN Mild Pain (1 - 3), Moderate Pain (4 - 6)  HYDROmorphone  Injectable 1 milliGRAM(s) IV Push every 4 hours PRN Severe Pain (7 - 10)      Allergies    No Known Allergies    Intolerances        LABS:                        10.2   14.91 )-----------( 228      ( 07 Jan 2021 06:49 )             33.9     01-07    130<L>  |  90<L>  |  22  ----------------------------<  124<H>  4.5   |  27  |  4.77<H>    Ca    9.0      07 Jan 2021 06:47  Phos  4.4     01-07  Mg     2.2     01-07    TPro  7.8  /  Alb  4.2  /  TBili  5.6<H>  /  DBili  0.6<H>  /  AST  15  /  ALT  11  /  AlkPhos  133<H>  01-07    PT/INR - ( 07 Jan 2021 06:48 )   PT: 14.7 sec;   INR: 1.24 ratio         PTT - ( 07 Jan 2021 06:48 )  PTT:34.7 sec      RADIOLOGY & ADDITIONAL TESTS:  Studies reviewed.

## 2021-01-08 NOTE — PROGRESS NOTE ADULT - PROBLEM SELECTOR PLAN 3
-s/p liver consult, concern for non-cirrhotic portal hypertension which is related to PCV.   -s/p IR consult, plan for transjugular liver biopsy w/ pressure gradient measurements on Monday (1/11), need to coordinate with dialysis, Dialysis need to happen after IR biopsy (as contrast load will be given during biopsy)   - With IR biopsy, will need to send for reticulin stain to rule in nodular regenerative hyperplasia. -s/p liver consult, concern for non-cirrhotic portal hypertension which is related to PCV.   -s/p IR consult, plan for transjugular liver biopsy w/ pressure gradient measurements on Monday (1/11), need to coordinate with dialysis, Dialysis need to happen after IR biopsy (as contrast load will be given during biopsy)   - With IR biopsy, will need to send for reticulin stain to rule in nodular regenerative hyperplasia.  - Will also need EGD early next week - f/u with hepatology re timing

## 2021-01-08 NOTE — PROGRESS NOTE ADULT - ATTENDING COMMENTS
ESRD on HD-HD MWF; plan HD today  epigastric pain- ongoing hx GERD/epigastric pain/early satiety/weight loss- GI eval; may need EGD?  splenomegaly  hepatology follw up  Anemia due to CKD- monitor Hb; on EPO 10k tiw with HD; continue; on Venofer 50mg weekly as outpatient  Secondary hyperpth renal origin- on hectorol 1.5mcg with hd  hyperphosphatemia-on phoslo- with meals; continue

## 2021-01-08 NOTE — PROGRESS NOTE ADULT - ATTENDING COMMENTS
60yoF with Hx of HTN, ESRD on HD (MWF), JAK2+ PCV with splenomegaly presenting with abdominal pain likely due to splenomegaly, also with hepatomegaly and evidence of portal HTN, hepatology workup ongoing. Plan is for liver biopsy and EGD. From our perspective, her hemoglobin not elevated and in fact low. Do think eventually repeat bone marrow biopsy would be beneficial, but would hold hydrea for now.

## 2021-01-08 NOTE — PROGRESS NOTE ADULT - ASSESSMENT
60yoF with Hx of HTN, ESRD on HD (MWF), JAK2+ PCV with splenomegaly presenting with abdominal pain x1 day.     #Abdominal pain, possibly 2/2 splenic infarct vs acute on chronic splenic thrombosis  - Doppler studies negative for thrombus  - Unclear etiology of splenomegaly. W/u per primary team  - agree with cirrhosis workup, per patient she has never seen hepatology in past and does not know of a cirrhosis dx    #JAK2+ PCV  - she is noncompliant with hydrea, has not seen heme in 1 year  - at this time does not need phlebotomy, she does not have erythrocytosis  - will discuss resuming hydrea as outpt  - Will need outpatient follow-up with Dr. Brant Flores MD  Hematology/Oncology Fellow, PGY-4  Pager: [phone]  After 5pm and on weekends please page on-call fellow 60yoF with Hx of HTN, ESRD on HD (MWF), JAK2+ PCV with splenomegaly presenting with abdominal pain x1 day.     #Abdominal pain, possibly 2/2 splenic infarct vs acute on chronic splenic thrombosis  - Doppler studies negative for thrombus  -Please check abdominal xray. Patient complaining of constipation and worsening abdominal pain.  - Unclear etiology of splenomegaly. W/u per primary team  - agree with cirrhosis workup, per patient she has never seen hepatology in past and does not know of a cirrhosis dx    #JAK2+ PCV  - she is noncompliant with hydrea, has not seen heme in 1 year  - at this time does not need phlebotomy, she does not have erythrocytosis  - will discuss resuming hydrea as outpt  - Will need outpatient follow-up with Dr. Brant Flores MD  Hematology/Oncology Fellow, PGY-4  Pager: [phone]  After 5pm and on weekends please page on-call fellow

## 2021-01-08 NOTE — PROGRESS NOTE ADULT - PROBLEM SELECTOR PLAN 1
US abdomen doppler: no evidence of vascular thrombosis   NM Liver + spleen scan: hepatosplenomegaly, no evidence of splenic infarct   Differential: noncirrhotic portal hypertension related to PCV   - s/p hepatology consult ,   - s/p hematology consult, f/u recs  -c/w dilaudid 1mg IV q4h PRN for severe pain, as poor PO tolerance   -clear liquid diet US abdomen doppler: no evidence of vascular thrombosis   NM Liver + spleen scan: hepatosplenomegaly, no evidence of splenic infarct   Differential: noncirrhotic portal hypertension related to PCV   - s/p hepatology consult , plan for liver Bx and EGD early next week   - s/p hematology consult; no thrombosis, no need for therapeutic lovenox at this time. Resume hydrea outpt.   - c/w dilaudid 1mg IV q4h PRN for severe pain, as poor PO tolerance   - advance diet as tolerated

## 2021-01-08 NOTE — PROGRESS NOTE ADULT - SUBJECTIVE AND OBJECTIVE BOX
[facility] DIVISION OF KIDNEY DISEASES AND HYPERTENSION   -- FOLLOW UP NOTE --   Fei Bruce  Nephrology Fellow  Pager NS: [phone]   /  Pager ALBERTA: [phone]  (after 5pm or weekend please page the on-call fellow)  ======================================================  24 hour events/subjective:  - yesterday patient had nuclear scan showing no evidence splenic infarct.  CXR and AXR show no acute process.  - overnight no events reported, vitals afebrile no hypotensive episode, total UOP *** in the past 24hr  - patient seen and examined at bedside this morning without complaints  - vitals/lab/medications reviewed, noted for    PAST HISTORY  --------------------------------------------------------------------------------  No significant changes to PMH, PSH, FHx, SHx, unless otherwise noted    ALLERGIES & MEDICATIONS  --------------------------------------------------------------------------------  Allergies    No Known Allergies    Intolerances    Standing Inpatient Medications  calcium acetate 1334 milliGRAM(s) Oral three times a day with meals  epoetin filiberto-epbx (RETACRIT) Injectable 79831 Unit(s) IV Push <User Schedule>  heparin   Injectable 5000 Unit(s) SubCutaneous every 12 hours  ondansetron Injectable 4 milliGRAM(s) IV Push every 6 hours  pantoprazole    Tablet 40 milliGRAM(s) Oral before breakfast  polyethylene glycol 3350 17 Gram(s) Oral daily  senna 2 Tablet(s) Oral at bedtime    PRN Inpatient Medications  acetaminophen   Tablet .. 650 milliGRAM(s) Oral every 6 hours PRN  HYDROmorphone  Injectable 1 milliGRAM(s) IV Push every 4 hours PRN    REVIEW OF SYSTEMS  --------------------------------------------------------------------------------  Gen: no fever, chills, weakness  Respiratory: No dyspnea, cough  CV: No chest pain, orthopnea  GI: No abdominal pain, nausea, + vomiting  MSK: no edema  Neuro: No dizziness, lightheadedness  Heme: No bleeding  All other systems were reviewed and are negative, except as noted.    VITALS/PHYSICAL EXAM  --------------------------------------------------------------------------------  T(C): 36.8 (01-08-21 @ 05:00), Max: 36.9 (01-07-21 @ 14:25)  HR: 84 (01-08-21 @ 05:00) (84 - 94)  BP: 105/68 (01-08-21 @ 05:00) (104/68 - 124/82)  RR: 18 (01-08-21 @ 05:00) (16 - 20)  SpO2: 94% (01-08-21 @ 05:00) (93% - 99%)  Wt(kg): --    01-07-21 @ 07:01  -  01-08-21 @ 07:00  --------------------------------------------------------  IN: 880 mL / OUT: 200 mL / NET: 680 mL    Physical Exam:  	Gen: NAD, well-appearing on room air  	HEENT: moist mucous membrane  	Pulm: CTA B/L, no crackles  	CV: RRR, S1S2; no rub/murmur  	GI: +BS, soft  	MSK: Warm, no edema              Neuro: AAOx3  	Psych: Normal affect and mood  	Skin: Warm, no cyanosis  	Vascular access: LUE AVF +thrills/bruits    LABS/STUDIES  --------------------------------------------------------------------------------              10.2   14.91 >-----------<  228      [01-07-21 @ 06:49]              33.9     130  |  90  |  22  ----------------------------<  124      [01-07-21 @ 06:47]  4.5   |  27  |  4.77        Ca     9.0     [01-07-21 @ 06:47]      Mg     2.2     [01-07-21 @ 06:47]      Phos  4.4     [01-07-21 @ 06:47]    TPro  7.8  /  Alb  4.2  /  TBili  5.6  /  DBili  0.6  /  AST  15  /  ALT  11  /  AlkPhos  133  [01-07-21 @ 06:47]    PT/INR: PT 14.7 , INR 1.24       [01-07-21 @ 06:48]  PTT: 34.7       [01-07-21 @ 06:48]          [01-07-21 @ 06:47]    Creatinine Trend:  SCr 4.77 [01-07 @ 06:47]  SCr 5.07 [01-05 @ 16:55]    PTH -- (Ca 8.6)      [01-26-20 @ 09:35]   175  HbA1c 4.9      [06-01-19 @ 00:46]

## 2021-01-08 NOTE — PROGRESS NOTE ADULT - PROBLEM SELECTOR PROBLEM 6
ESRD (end stage renal disease) on dialysis The patient is a 52y/o Female pt of Dr. Flores from Oncology w/ hx of anxiety, hypothyroid diagnosed w/ Rt breast CA in December of 2016 s/p Right lumpectomy followed by radiation presents to ED for psych eval in setting of depression w/ SI.  Pt states that the she has no one to care for her and thought about overdosing on her Xanax.  Pt denies other injuries, trauma or falls, no headache, back or neck pain, no abd/flank pain, LUTS, nausea or vomiting, no fever or chills, no cp or sob, no palpitations or diaphoresis.  Denies etoh or illicit drugs, no HI, no AVH.  Of note, pt reported frontal Ha which she associates with stress, pain is 2/10, usually takes Advil for HA with relief, no visual changes.  Endorsed no other symptoms. The patient is a 50y/o Female pt of Dr. Flores from Oncology w/ hx of anxiety, hypothyroid diagnosed w/ Rt breast CA in December of 2016 s/p Right lumpectomy followed by radiation presents to ED for psych eval in setting of depression w/ SI.  Pt states that the she has no one to care for her and thought about overdosing on her Xanax.  Pt denies other injuries, trauma or falls, no headache, back or neck pain, no abd/flank pain, LUTS, nausea or vomiting, no fever or chills, no cp or sob, no palpitations or diaphoresis.  Denies pain or redness or drainage over rt breast radiation site.  Denies etoh or illicit drugs, no HI, no AVH.  Of note, pt reported frontal Ha which she associates with stress, pain is 2/10, usually takes Advil for HA with relief, no visual changes.  Endorsed no other symptoms.

## 2021-01-08 NOTE — PROGRESS NOTE ADULT - ASSESSMENT
60F PMHx ESRD HD ([facility], [place], Dr De León), cirrhosis, JAK2+ PCV with splenomegaly - admitted for abdominal pain  ruled out splenic infarct.  Nephrology consulted for ESRD on HD via LUE AVF.      # ESRD   Pt. with ESRD on HD three times a week ([facility] @ [place], Dr. De León). Last HD was on 1/6/21 via LUE AVF, target/dry weight 55kg. Labs reviewed.   - plan for maintenance HD today UF goal 0.5L as BP tolerate  - monitor BMP, fluid restriction 1.2 liters per day, renally dose medications per HD, renal diet    # Hypertension  BP in acceptable range. Monitor BP on current BP medications. Low salt diet advised    # Anemia   Pt. with anemia in the setting of ESRD Hgb level at acceptable target.   - continue epogen 10,000U on HD days and monitor Hgb    # Renal bone disease  Pt. with hyperphosphatemia in the setting of ESRD  - continue calcium acetate 1334 mg TID with meals. Low phosphorus diet advised. Monitor serum phosphorus

## 2021-01-08 NOTE — PROGRESS NOTE ADULT - SUBJECTIVE AND OBJECTIVE BOX
PROGRESS NOTE:   Authored by Dr. Dorothea Mondragon MD (PGY-1). Pager [phone]    Patient is a 60y old  Female who presents with a chief complaint of Abdominal pain (07 Jan 2021 15:34)      SUBJECTIVE / OVERNIGHT EVENTS:  No acute events overnight. Patient denies fevers, chills, chest pain, SOB, n/v.      ADDITIONAL REVIEW OF SYSTEMS:    MEDICATIONS  (STANDING):  calcium acetate 1334 milliGRAM(s) Oral three times a day with meals  epoetin filiberto-epbx (RETACRIT) Injectable 67309 Unit(s) IV Push <User Schedule>  heparin   Injectable 5000 Unit(s) SubCutaneous every 12 hours  ondansetron Injectable 4 milliGRAM(s) IV Push every 6 hours  pantoprazole    Tablet 40 milliGRAM(s) Oral before breakfast  polyethylene glycol 3350 17 Gram(s) Oral daily  senna 2 Tablet(s) Oral at bedtime    MEDICATIONS  (PRN):  acetaminophen   Tablet .. 650 milliGRAM(s) Oral every 6 hours PRN Mild Pain (1 - 3), Moderate Pain (4 - 6)  HYDROmorphone  Injectable 1 milliGRAM(s) IV Push every 4 hours PRN Severe Pain (7 - 10)      CAPILLARY BLOOD GLUCOSE      POCT Blood Glucose.: 131 mg/dL (07 Jan 2021 17:53)    I&O's Summary    06 Jan 2021 07:01  -  07 Jan 2021 07:00  --------------------------------------------------------  IN: 30 mL / OUT: 1130 mL / NET: -1100 mL    07 Jan 2021 07:01  -  08 Jan 2021 06:10  --------------------------------------------------------  IN: 880 mL / OUT: 200 mL / NET: 680 mL        PHYSICAL EXAM:  Vital Signs Last 24 Hrs  T(C): 36.8 (08 Jan 2021 05:00), Max: 36.9 (07 Jan 2021 14:25)  T(F): 98.3 (08 Jan 2021 05:00), Max: 98.5 (07 Jan 2021 20:53)  HR: 84 (08 Jan 2021 05:00) (84 - 94)  BP: 105/68 (08 Jan 2021 05:00) (104/68 - 124/82)  BP(mean): --  RR: 18 (08 Jan 2021 05:00) (16 - 20)  SpO2: 94% (08 Jan 2021 05:00) (93% - 99%)    CONSTITUTIONAL: NAD, well-developed  RESPIRATORY: Normal respiratory effort; lungs are clear to auscultation bilaterally  CARDIOVASCULAR: Regular rate and rhythm, normal S1 and S2, no murmur/rub/gallop; No lower extremity edema; Peripheral pulses are 2+ bilaterally  ABDOMEN: Nontender to palpation, normoactive bowel sounds, no rebound/guarding; No hepatosplenomegaly  MUSCLOSKELETAL: no clubbing or cyanosis of digits; no joint swelling or tenderness to palpation  PSYCH: A+O to person, place, and time; affect appropriate    LABS:                        10.2   14.91 )-----------( 228      ( 07 Jan 2021 06:49 )             33.9     01-07    130<L>  |  90<L>  |  22  ----------------------------<  124<H>  4.5   |  27  |  4.77<H>    Ca    9.0      07 Jan 2021 06:47  Phos  4.4     01-07  Mg     2.2     01-07    TPro  7.8  /  Alb  4.2  /  TBili  5.6<H>  /  DBili  0.6<H>  /  AST  15  /  ALT  11  /  AlkPhos  133<H>  01-07    PT/INR - ( 07 Jan 2021 06:48 )   PT: 14.7 sec;   INR: 1.24 ratio         PTT - ( 07 Jan 2021 06:48 )  PTT:34.7 sec            Tele Reviewed:    RADIOLOGY & ADDITIONAL TESTS:  Results Reviewed:   Imaging Personally Reviewed:  Electrocardiogram Personally Reviewed:     PROGRESS NOTE:   Authored by Dr. Dorothea Mondragon MD (PGY-1). Pager [phone]    Patient is a 60y old  Female who presents with a chief complaint of Abdominal pain (07 Jan 2021 15:34)      SUBJECTIVE / OVERNIGHT EVENTS:  No acute events overnight. Patient reports feeling hungry and would like to try and eat something. She continues to have LUQ pain that has been present since admission.     ADDITIONAL REVIEW OF SYSTEMS:  Patient denies fevers, chills, chest pain, SOB, n/v.    MEDICATIONS  (STANDING):  calcium acetate 1334 milliGRAM(s) Oral three times a day with meals  epoetin filiberto-epbx (RETACRIT) Injectable 11225 Unit(s) IV Push <User Schedule>  heparin   Injectable 5000 Unit(s) SubCutaneous every 12 hours  ondansetron Injectable 4 milliGRAM(s) IV Push every 6 hours  pantoprazole    Tablet 40 milliGRAM(s) Oral before breakfast  polyethylene glycol 3350 17 Gram(s) Oral daily  senna 2 Tablet(s) Oral at bedtime    MEDICATIONS  (PRN):  acetaminophen   Tablet .. 650 milliGRAM(s) Oral every 6 hours PRN Mild Pain (1 - 3), Moderate Pain (4 - 6)  HYDROmorphone  Injectable 1 milliGRAM(s) IV Push every 4 hours PRN Severe Pain (7 - 10)      CAPILLARY BLOOD GLUCOSE      POCT Blood Glucose.: 131 mg/dL (07 Jan 2021 17:53)    I&O's Summary    06 Jan 2021 07:01  -  07 Jan 2021 07:00  --------------------------------------------------------  IN: 30 mL / OUT: 1130 mL / NET: -1100 mL    07 Jan 2021 07:01  -  08 Jan 2021 06:10  --------------------------------------------------------  IN: 880 mL / OUT: 200 mL / NET: 680 mL        PHYSICAL EXAM:  Vital Signs Last 24 Hrs  T(C): 36.8 (08 Jan 2021 05:00), Max: 36.9 (07 Jan 2021 14:25)  T(F): 98.3 (08 Jan 2021 05:00), Max: 98.5 (07 Jan 2021 20:53)  HR: 84 (08 Jan 2021 05:00) (84 - 94)  BP: 105/68 (08 Jan 2021 05:00) (104/68 - 124/82)  BP(mean): --  RR: 18 (08 Jan 2021 05:00) (16 - 20)  SpO2: 94% (08 Jan 2021 05:00) (93% - 99%)    CONSTITUTIONAL: NAD, well-developed, anxious appearing middle age female, walking and pacing around hallway and her room   RESPIRATORY: Normal respiratory effort; lungs are clear to auscultation bilaterally  CARDIOVASCULAR: Regular rate and rhythm, normal S1 and S2, no murmur/rub/gallop; No lower extremity edema; Peripheral pulses are 2+ bilaterally; LUE AVF palpable thrill   ABDOMEN: normoactive bowel sounds; Splenomegaly, Referred pain in LUQ when pressed on R abdomen; LUQ tender to palpation   MUSCLOSKELETAL:  no joint swelling or tenderness to palpation  PSYCH: A+O to person, place, and time; affect appropriate    LABS:                        10.2   14.91 )-----------( 228      ( 07 Jan 2021 06:49 )             33.9     01-07    130<L>  |  90<L>  |  22  ----------------------------<  124<H>  4.5   |  27  |  4.77<H>    Ca    9.0      07 Jan 2021 06:47  Phos  4.4     01-07  Mg     2.2     01-07    TPro  7.8  /  Alb  4.2  /  TBili  5.6<H>  /  DBili  0.6<H>  /  AST  15  /  ALT  11  /  AlkPhos  133<H>  01-07    PT/INR - ( 07 Jan 2021 06:48 )   PT: 14.7 sec;   INR: 1.24 ratio         PTT - ( 07 Jan 2021 06:48 )  PTT:34.7 sec

## 2021-01-08 NOTE — PROGRESS NOTE ADULT - ASSESSMENT
60yoF with Hx of HTN, pancreatic cysts, cirrhosis, ESRD on HD (MWF), JAK2+ PCV with splenomegaly presenting with abdominal pain of unknown etiology.    Impression:  # Abdominal pain of unknown etiology. Found to have abdominal varices on CT. Currently ruled out splenic infarct/thrombosis on imaging, but has marked 20 cm splenomegaly which is new finding. Given hx of PCV and splenomegaly, could have increased abdominal pressure caused by possible ?noncirrhotic portal hypertension vs ?neoplastic transformation of the liver and would benefit from liver bx with transjugular pressure.  # PCV JAK2 +, non adherent to hydrea therapy. HemOnc was consulted.  # ESRD on HD (MWF). Nephrology was consulted.    Recommendations:  - F/U transjugular liver bx and send for reticulin stain to rule in nodular regenerative hyperplasia.  - F/U iron panel, MMA, and folic acid  - Plan for endoscopy possibly early next week given pt on HD   - PCV per HemOnc  - ESRD on HD per Nephro  - Rest of the care per primary team    Gavin Abraham MD  Gastroenterology and Hepatology Fellow  Please contact via [employer] Teams    Please call Hepatology ([phone]) if there are any additional questions or concerns.    Please call answering service for on-call GI fellow ([phone]) after 5pm and before 8am, and on weekends.     60yoF with Hx of HTN, pancreatic cysts, cirrhosis, ESRD on HD (MWF), JAK2+ PCV with splenomegaly presenting with abdominal pain of unknown etiology.    Impression:  # Abdominal pain of unknown etiology. Found to have abdominal varices on CT. Currently ruled out splenic infarct/thrombosis on imaging, but has marked 20 cm splenomegaly which is new finding. Given hx of PCV and splenomegaly, could have increased abdominal pressure caused by possible ?noncirrhotic portal hypertension vs ?neoplastic transformation of the liver and would benefit from liver bx with transjugular pressure.  # PCV JAK2 +, non adherent to hydrea therapy. HemOnc was consulted.  # ESRD on HD (MWF). Nephrology was consulted.    Recommendations:  - F/U nephrology and Heme/Onc recommendations  - F/U transjugular liver bx and send for reticulin stain to rule in nodular regenerative hyperplasia - scheduled for 1/11/21  - Tenatively plan for EGD for variceal screening on Tuesday 1/12/21  - F/U iron panel, MMA, and folic acid  - Rest of the care per primary team    Gavin Abraham MD  Gastroenterology and Hepatology Fellow  Please contact via [employer] Teams    Please call Hepatology ([phone]) if there are any additional questions or concerns.    Please call answering service for on-call GI fellow ([phone]) after 5pm and before 8am, and on weekends.

## 2021-01-08 NOTE — PROGRESS NOTE ADULT - SUBJECTIVE AND OBJECTIVE BOX
Chief Complaint: Abdominal pain  Reason for consult: Abnormal imaging    Interval Events:   - No acute events overnight    Allergies:  No Known Allergies    Hospital Medications:  acetaminophen   Tablet .. 650 milliGRAM(s) Oral every 6 hours PRN  calcium acetate 1334 milliGRAM(s) Oral three times a day with meals  epoetin filiberto-epbx (RETACRIT) Injectable 65320 Unit(s) IV Push <User Schedule>  heparin   Injectable 5000 Unit(s) SubCutaneous every 12 hours  HYDROmorphone  Injectable 1 milliGRAM(s) IV Push every 4 hours PRN  ondansetron Injectable 4 milliGRAM(s) IV Push every 6 hours  pantoprazole    Tablet 40 milliGRAM(s) Oral before breakfast  polyethylene glycol 3350 17 Gram(s) Oral daily  senna 2 Tablet(s) Oral at bedtime      PMHX/PSHX:  Pancreatic cyst    Cirrhosis of liver without ascites, unspecified hepatic cirrhosis type    ESRD on peritoneal dialysis    Splenic infarct    Splenomegaly    Hypertension    Peptic ulcer disease    Polycythemia vera    AV fistula    Hemoperitoneum as complication of peritoneal dialysis    Peritoneal dialysis catheter in place    No significant past surgical history    Family history:  Family history of malignant neoplasm (Grandparent)    Family history of hypertension in mother    No pertinent family history in first degree relatives    ROS:     General:  No wt loss, fevers, chills, night sweats, fatigue,   Eyes:  Good vision, no reported pain  ENT:  No sore throat, pain, runny nose, dysphagia  CV:  No pain, palpitations, hypo/hypertension  Pulm:  No dyspnea, cough, tachypnea, wheezing  GI:  No pain, No nausea, No vomiting, No diarrhea, No constipation, No weight loss, No fever, No pruritis, No rectal bleeding, No tarry stools, No dysphagia  :  No pain, bleeding, incontinence, nocturia  Muscle:  No pain, weakness  Neuro:  No weakness, tingling, memory problems  Psych:  No fatigue, insomnia, mood problems, depression  Endocrine:  No polyuria, polydipsia, cold/heat intolerance  Heme:  No petechiae, ecchymosis, easy bruisability  Skin:  No rash, tattoos, scars, edema    PHYSICAL EXAM:   Vital Signs:  Vital Signs Last 24 Hrs  T(C): 36.8 (08 Jan 2021 05:00), Max: 36.9 (07 Jan 2021 14:25)  T(F): 98.3 (08 Jan 2021 05:00), Max: 98.5 (07 Jan 2021 20:53)  HR: 84 (08 Jan 2021 05:00) (84 - 94)  BP: 105/68 (08 Jan 2021 05:00) (104/68 - 124/82)  BP(mean): --  RR: 18 (08 Jan 2021 05:00) (16 - 20)  SpO2: 94% (08 Jan 2021 05:00) (93% - 99%)    GENERAL:  No acute distress  HEENT:  Normocephalic/atraumatic,  no scleral icterus  CHEST:  Normal effort, no accessory muscle use  ABDOMEN:  Soft, non-tender, non-distended  EXTREMITIES:  No cyanosis, clubbing, or edema  SKIN:  No rash/erythema  NEURO:  Alert and oriented x 3, no asterixis    LABS:                        10.2   14.91 )-----------( 228      ( 07 Jan 2021 06:49 )             33.9       01-07    130<L>  |  90<L>  |  22  ----------------------------<  124<H>  4.5   |  27  |  4.77<H>    Ca    9.0      07 Jan 2021 06:47  Phos  4.4     01-07  Mg     2.2     01-07    TPro  7.8  /  Alb  4.2  /  TBili  5.6<H>  /  DBili  0.6<H>  /  AST  15  /  ALT  11  /  AlkPhos  133<H>  01-07    LIVER FUNCTIONS - ( 07 Jan 2021 06:47 )  Alb: 4.2 g/dL / Pro: 7.8 g/dL / ALK PHOS: 133 U/L / ALT: 11 U/L / AST: 15 U/L / GGT: x           PT/INR - ( 07 Jan 2021 06:48 )   PT: 14.7 sec;   INR: 1.24 ratio      PTT - ( 07 Jan 2021 06:48 )  PTT:34.7 sec               10.2   14.91 )-----------( 228      ( 07 Jan 2021 06:49 )             33.9                         10.1   14.49 )-----------( 220      ( 05 Jan 2021 16:55 )             33.8     Imaging:    Reviewed

## 2021-01-08 NOTE — PROGRESS NOTE ADULT - PROBLEM SELECTOR PLAN 2
One episode of bilious vomiting today   s/p upright chest xray, abdominal xray, negative for acute intraabdominal pathology  - c/w clear liquid diet   - zofran PRN # Improving   One episode of bilious vomiting 1/7  s/p upright chest xray, abdominal xray, negative for acute intraabdominal pathology  - advance diet as tolerated   - zofran PRN

## 2021-01-08 NOTE — PROGRESS NOTE ADULT - PROBLEM SELECTOR PLAN 9
DVT px: hep subc  Diet: Clear renal diet   Discharge: pending medical improvement. Of note patient undocumented, with no medical insurance, need to coordinate with fellows' clinic and ensure medication affordable.     Full Code DVT px: hep subc  Diet: Soft renal diet   Discharge: pending medical improvement. Of note patient undocumented, with no medical insurance, need to coordinate with fellows' clinic and ensure medication affordable.     Full Code

## 2021-01-08 NOTE — PROGRESS NOTE ADULT - ATTENDING COMMENTS
Krystina Aguilar MD  Division of Hospital Medicine  [facility]   Pager: [phone]    Patient seen and examined today with Team 5 Resident, Intern, and MS3. 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     1. Epigastric/LLQ pain:  abd duplex and NM spleen scan negative for thrombosis nor splenic infarct. etiology still unclear with regards to the acute worsening. but could be 2/2 to her splenomegaly? abd duplex and NM spleen scan negative for thrombosis nor splenic infarct. she describes me what sounds like referred pain in her LLQ, worse with deep breaths.  2. Abd varices on CT: hepatology consult appreciated, concern for noncirrhotic portal hypertension. recommending IR consult for TJ liver bx with pressure gradient measurements, planned for Mon 1/11. Will need HD after. also planned for EGD with GI early next week.  3. JAK2+ PCV: admits to me in Kiswahili, she does not take her hydroxyurea. heme consult recs appreciated. resume hydroxyurea, outpatient BM bx.  4. ESRD on HD: on M/W/F schedule. follows with Dr. De León. nephro on board for her maintenance HD. will need to coordinate HD session after IR liver bx on monday    Rest as detailed above.

## 2021-01-08 NOTE — PROGRESS NOTE ADULT - PROBLEM SELECTOR PLAN 6
- [facility]  -MWF dialysis, last on Monday (1/4) outpatient  -s/p nephro consult, HD M/W/F while inpatient (s/p HD 1/6)

## 2021-01-09 LAB
ALBUMIN SERPL ELPH-MCNC: 4.3 G/DL — SIGNIFICANT CHANGE UP (ref 3.3–5)
ALP SERPL-CCNC: 138 U/L — HIGH (ref 40–120)
ALT FLD-CCNC: 8 U/L — LOW (ref 10–45)
ANION GAP SERPL CALC-SCNC: 16 MMOL/L — SIGNIFICANT CHANGE UP (ref 5–17)
APPEARANCE UR: ABNORMAL
APTT BLD: 33.1 SEC — SIGNIFICANT CHANGE UP (ref 27.5–35.5)
AST SERPL-CCNC: 18 U/L — SIGNIFICANT CHANGE UP (ref 10–40)
BACTERIA # UR AUTO: ABNORMAL
BILIRUB SERPL-MCNC: 4.5 MG/DL — HIGH (ref 0.2–1.2)
BILIRUB UR-MCNC: NEGATIVE — SIGNIFICANT CHANGE UP
BUN SERPL-MCNC: 27 MG/DL — HIGH (ref 7–23)
CALCIUM SERPL-MCNC: 9.1 MG/DL — SIGNIFICANT CHANGE UP (ref 8.4–10.5)
CHLORIDE SERPL-SCNC: 87 MMOL/L — LOW (ref 96–108)
CO2 SERPL-SCNC: 27 MMOL/L — SIGNIFICANT CHANGE UP (ref 22–31)
COLOR SPEC: YELLOW — SIGNIFICANT CHANGE UP
CREAT SERPL-MCNC: 5.51 MG/DL — HIGH (ref 0.5–1.3)
DIFF PNL FLD: ABNORMAL
EPI CELLS # UR: 18 /HPF — HIGH
FERRITIN SERPL-MCNC: 640 NG/ML — HIGH (ref 15–150)
FOLATE SERPL-MCNC: >20 NG/ML — SIGNIFICANT CHANGE UP
GLUCOSE SERPL-MCNC: 84 MG/DL — SIGNIFICANT CHANGE UP (ref 70–99)
GLUCOSE UR QL: ABNORMAL
HCT VFR BLD CALC: 32.4 % — LOW (ref 34.5–45)
HGB BLD-MCNC: 9.9 G/DL — LOW (ref 11.5–15.5)
HYALINE CASTS # UR AUTO: 8 /LPF — HIGH (ref 0–2)
INR BLD: 1.16 RATIO — SIGNIFICANT CHANGE UP (ref 0.88–1.16)
IRON SATN MFR SERPL: 37 % — SIGNIFICANT CHANGE UP (ref 14–50)
IRON SATN MFR SERPL: 56 UG/DL — SIGNIFICANT CHANGE UP (ref 30–160)
KETONES UR-MCNC: SIGNIFICANT CHANGE UP
LEUKOCYTE ESTERASE UR-ACNC: ABNORMAL
MAGNESIUM SERPL-MCNC: 2.3 MG/DL — SIGNIFICANT CHANGE UP (ref 1.6–2.6)
MCHC RBC-ENTMCNC: 28.5 PG — SIGNIFICANT CHANGE UP (ref 27–34)
MCHC RBC-ENTMCNC: 30.6 GM/DL — LOW (ref 32–36)
MCV RBC AUTO: 93.4 FL — SIGNIFICANT CHANGE UP (ref 80–100)
NITRITE UR-MCNC: NEGATIVE — SIGNIFICANT CHANGE UP
NRBC # BLD: 1 /100 WBCS — HIGH (ref 0–0)
OSMOLALITY UR: 307 MOS/KG — SIGNIFICANT CHANGE UP (ref 300–900)
PH UR: 8 — SIGNIFICANT CHANGE UP (ref 5–8)
PHOSPHATE SERPL-MCNC: 5.3 MG/DL — HIGH (ref 2.5–4.5)
PLATELET # BLD AUTO: 212 K/UL — SIGNIFICANT CHANGE UP (ref 150–400)
POTASSIUM SERPL-MCNC: 4.1 MMOL/L — SIGNIFICANT CHANGE UP (ref 3.5–5.3)
POTASSIUM SERPL-SCNC: 4.1 MMOL/L — SIGNIFICANT CHANGE UP (ref 3.5–5.3)
PROT SERPL-MCNC: 7.8 G/DL — SIGNIFICANT CHANGE UP (ref 6–8.3)
PROT UR-MCNC: ABNORMAL
PROTHROM AB SERPL-ACNC: 13.8 SEC — HIGH (ref 10.6–13.6)
RBC # BLD: 3.47 M/UL — LOW (ref 3.8–5.2)
RBC # FLD: 20.2 % — HIGH (ref 10.3–14.5)
RBC CASTS # UR COMP ASSIST: 1 /HPF — SIGNIFICANT CHANGE UP (ref 0–4)
SODIUM SERPL-SCNC: 130 MMOL/L — LOW (ref 135–145)
SODIUM UR-SCNC: 68 MMOL/L — SIGNIFICANT CHANGE UP
SP GR SPEC: 1.02 — SIGNIFICANT CHANGE UP (ref 1.01–1.02)
TIBC SERPL-MCNC: 150 UG/DL — LOW (ref 220–430)
UIBC SERPL-MCNC: 94 UG/DL — LOW (ref 110–370)
UROBILINOGEN FLD QL: NEGATIVE — SIGNIFICANT CHANGE UP
VIT B12 SERPL-MCNC: 1792 PG/ML — HIGH (ref 232–1245)
WBC # BLD: 11.87 K/UL — HIGH (ref 3.8–10.5)
WBC # FLD AUTO: 11.87 K/UL — HIGH (ref 3.8–10.5)
WBC UR QL: 251 /HPF — HIGH (ref 0–5)

## 2021-01-09 PROCEDURE — 99233 SBSQ HOSP IP/OBS HIGH 50: CPT | Mod: GC

## 2021-01-09 RX ADMIN — ONDANSETRON 4 MILLIGRAM(S): 8 TABLET, FILM COATED ORAL at 07:45

## 2021-01-09 RX ADMIN — HEPARIN SODIUM 5000 UNIT(S): 5000 INJECTION INTRAVENOUS; SUBCUTANEOUS at 18:00

## 2021-01-09 RX ADMIN — PANTOPRAZOLE SODIUM 40 MILLIGRAM(S): 20 TABLET, DELAYED RELEASE ORAL at 08:46

## 2021-01-09 RX ADMIN — Medication 1334 MILLIGRAM(S): at 08:46

## 2021-01-09 RX ADMIN — HEPARIN SODIUM 5000 UNIT(S): 5000 INJECTION INTRAVENOUS; SUBCUTANEOUS at 06:04

## 2021-01-09 RX ADMIN — SENNA PLUS 2 TABLET(S): 8.6 TABLET ORAL at 21:24

## 2021-01-09 RX ADMIN — Medication 1334 MILLIGRAM(S): at 18:00

## 2021-01-09 RX ADMIN — POLYETHYLENE GLYCOL 3350 17 GRAM(S): 17 POWDER, FOR SOLUTION ORAL at 12:41

## 2021-01-09 RX ADMIN — ONDANSETRON 4 MILLIGRAM(S): 8 TABLET, FILM COATED ORAL at 18:00

## 2021-01-09 RX ADMIN — ONDANSETRON 4 MILLIGRAM(S): 8 TABLET, FILM COATED ORAL at 12:41

## 2021-01-09 RX ADMIN — Medication 1334 MILLIGRAM(S): at 12:41

## 2021-01-09 NOTE — PROGRESS NOTE ADULT - PROBLEM SELECTOR PLAN 9
DVT px: hep subc  Diet: Soft renal diet   Discharge: pending medical improvement. Of note patient undocumented, with no medical insurance, need to coordinate with fellows' clinic and ensure medication affordable.     Full Code

## 2021-01-09 NOTE — PROGRESS NOTE ADULT - PROBLEM SELECTOR PLAN 10
Dispo:   1.  Name of PCP:   2.  PCP Contacted on Admission: [ ] Y    [ ] N    3.  PCP contacted at Discharge: [ ] Y    [ ] N    [ ] N/A  4.  Post-Discharge Appointment Date and Location:  5.  Summary of Handoff given to PCP: as above

## 2021-01-09 NOTE — PROGRESS NOTE ADULT - ATTENDING COMMENTS
Patient seen and examined today. I agree with the above findings, assessment, and plan with the following additions and exceptions:     Patient reports abd pain has not changed in severity or character.   Monitor cbc. No s/s of blood loss. Send anyi to evaluate for immune vs non-immune causes of hemolysis.   Transjugular liver bx scheduled for 1/11/21  Tentative plan for EGD for variceal screening on 1/12/21  All consultant contribution to care greatly appreciated.   Rest of plan as above    Dr. Mick Khan DO  Attending Physician  Division of Hospital Medicine  [facility]  Pager:  [phone]

## 2021-01-09 NOTE — PROGRESS NOTE ADULT - PROBLEM SELECTOR PLAN 2
# Improving   One episode of bilious vomiting 1/7  s/p upright chest xray, abdominal xray, negative for acute intraabdominal pathology  - diet advanced to renal diet   - zofran PRN

## 2021-01-09 NOTE — PROGRESS NOTE ADULT - PROBLEM SELECTOR PLAN 1
US abdomen doppler: no evidence of vascular thrombosis   NM Liver + spleen scan: hepatosplenomegaly, no evidence of splenic infarct   Differential: noncirrhotic portal hypertension related to PCV   - s/p hepatology consult , plan for liver Bx and EGD early next week   - s/p hematology consult; no thrombosis, no need for therapeutic lovenox at this time. Resume hydrea outpt.   -hydromorphone PO 4mg q6h PRN severe pain  - diet advanced to renal diet

## 2021-01-09 NOTE — PROGRESS NOTE ADULT - SUBJECTIVE AND OBJECTIVE BOX
PROGRESS NOTE:     CONTACT INFO:  Lachelle Bautista MD  Internal Medicine PGY2  Pager: [phone]    Patient is a 60y old  Female who presents with a chief complaint of Abdominal pain (08 Jan 2021 09:24)      SUBJECTIVE / OVERNIGHT EVENTS:    ADDITIONAL REVIEW OF SYSTEMS:    MEDICATIONS  (STANDING):  calcium acetate 1334 milliGRAM(s) Oral three times a day with meals  epoetin filiberto-epbx (RETACRIT) Injectable 60069 Unit(s) IV Push <User Schedule>  heparin   Injectable 5000 Unit(s) SubCutaneous every 12 hours  ondansetron Injectable 4 milliGRAM(s) IV Push every 6 hours  pantoprazole    Tablet 40 milliGRAM(s) Oral before breakfast  polyethylene glycol 3350 17 Gram(s) Oral daily  senna 2 Tablet(s) Oral at bedtime    MEDICATIONS  (PRN):  acetaminophen   Tablet .. 650 milliGRAM(s) Oral every 6 hours PRN Mild Pain (1 - 3), Moderate Pain (4 - 6)  HYDROmorphone   Tablet 4 milliGRAM(s) Oral every 6 hours PRN Severe Pain (7 - 10)      CAPILLARY BLOOD GLUCOSE        I&O's Summary    07 Jan 2021 07:01  -  08 Jan 2021 07:00  --------------------------------------------------------  IN: 880 mL / OUT: 200 mL / NET: 680 mL    08 Jan 2021 07:01  -  09 Jan 2021 05:23  --------------------------------------------------------  IN: 0 mL / OUT: 500 mL / NET: -500 mL        PHYSICAL EXAM:  Vital Signs Last 24 Hrs  T(C): 36.7 (09 Jan 2021 05:08), Max: 36.8 (08 Jan 2021 11:40)  T(F): 98.1 (09 Jan 2021 05:08), Max: 98.2 (08 Jan 2021 11:40)  HR: 81 (09 Jan 2021 05:08) (81 - 115)  BP: 101/67 (09 Jan 2021 05:08) (91/63 - 110/70)  BP(mean): --  RR: 18 (09 Jan 2021 05:08) (16 - 20)  SpO2: 94% (09 Jan 2021 05:08) (94% - 97%)      LABS:                        10.1   13.87 )-----------( 230      ( 08 Jan 2021 10:03 )             32.9     01-08    132<L>  |  88<L>  |  43<H>  ----------------------------<  126<H>  4.7   |  25  |  7.28<H>    Ca    8.5      08 Jan 2021 10:03  Phos  6.3     01-08  Mg     2.4     01-08    TPro  7.9  /  Alb  4.2  /  TBili  6.0<H>  /  DBili  x   /  AST  17  /  ALT  8<L>  /  AlkPhos  134<H>  01-08    PT/INR - ( 07 Jan 2021 06:48 )   PT: 14.7 sec;   INR: 1.24 ratio         PTT - ( 07 Jan 2021 06:48 )  PTT:34.7 sec            RADIOLOGY & ADDITIONAL TESTS:  Results Reviewed:   Imaging Personally Reviewed:  Electrocardiogram Personally Reviewed:    COORDINATION OF CARE:  Care Discussed with Consultants/Other Providers [Y/N]:  Prior or Outpatient Records Reviewed [Y/N]:   PROGRESS NOTE:     CONTACT INFO:  Lachelle Bautista MD  Internal Medicine PGY2  Pager: [phone]    Patient is a 60y old  Female who presents with a chief complaint of Abdominal pain (08 Jan 2021 09:24)      SUBJECTIVE / OVERNIGHT EVENTS:  Patient tolerated dinner (two pieces of toast) well last night, no acute adverse event. Assessed patient at bedside this AM, denies chest pain/shortness of breath, + nausea (improved with zofran), mild LUQ abdominal pain (earlier in the morning, pain level 1 out of 10; now pain level 2 out of 10), last BM overnight.   ADDITIONAL REVIEW OF SYSTEMS:    MEDICATIONS  (STANDING):  calcium acetate 1334 milliGRAM(s) Oral three times a day with meals  epoetin filiberto-epbx (RETACRIT) Injectable 08923 Unit(s) IV Push <User Schedule>  heparin   Injectable 5000 Unit(s) SubCutaneous every 12 hours  ondansetron Injectable 4 milliGRAM(s) IV Push every 6 hours  pantoprazole    Tablet 40 milliGRAM(s) Oral before breakfast  polyethylene glycol 3350 17 Gram(s) Oral daily  senna 2 Tablet(s) Oral at bedtime    MEDICATIONS  (PRN):  acetaminophen   Tablet .. 650 milliGRAM(s) Oral every 6 hours PRN Mild Pain (1 - 3), Moderate Pain (4 - 6)  HYDROmorphone   Tablet 4 milliGRAM(s) Oral every 6 hours PRN Severe Pain (7 - 10)      CAPILLARY BLOOD GLUCOSE        I&O's Summary    07 Jan 2021 07:01  -  08 Jan 2021 07:00  --------------------------------------------------------  IN: 880 mL / OUT: 200 mL / NET: 680 mL    08 Jan 2021 07:01  -  09 Jan 2021 05:23  --------------------------------------------------------  IN: 0 mL / OUT: 500 mL / NET: -500 mL        PHYSICAL EXAM:  Vital Signs Last 24 Hrs  T(C): 36.7 (09 Jan 2021 05:08), Max: 36.8 (08 Jan 2021 11:40)  T(F): 98.1 (09 Jan 2021 05:08), Max: 98.2 (08 Jan 2021 11:40)  HR: 81 (09 Jan 2021 05:08) (81 - 115)  BP: 101/67 (09 Jan 2021 05:08) (91/63 - 110/70)  BP(mean): --  RR: 18 (09 Jan 2021 05:08) (16 - 20)  SpO2: 94% (09 Jan 2021 05:08) (94% - 97%)    GENERAL: NAD  HEAD:  Atraumatic, Normocephalic  HEENT: scleral anicteric.   CV: Regular rate and rhythm. No murmurs, rubs, or gallops  Respiratory: normal respiratory effort, speaking in complete sentences. Lungs clear to auscultation bilaterally, no wheezes/crackles.  ABDOMEN: Soft, LUQ mildly tender to palpation, when pressing the other 3 quadrants the LUQ has tenderness, active bowel sounds, no rebound, no guarding, nondistended   EXTREMITIES: No lower extremity edema. Bilateral LE symmetric in size.  PSYCH: AAOx3.   NEURO: Symmetric facial expressions.   Skin: No rashes    LABS:                       (01-09 @ 07:26)                      9.9  11.87 )-----------( 212                 32.4    Neutrophils = -- (--%)  Lymphocytes = -- (--%)  Eosinophils = -- (--%)  Basophils = -- (--%)  Monocytes = -- (--%)  Bands = --%    01-09    130<L>  |  87<L>  |  27<H>  ----------------------------<  84  4.1   |  27  |  5.51<H>    Ca    9.1      09 Jan 2021 07:51  Phos  5.3     01-09  Mg     2.3     01-09    TPro  7.8  /  Alb  4.3  /  TBili  4.5<H>  /  DBili  x   /  AST  18  /  ALT  8<L>  /  AlkPhos  138<H>  01-09    ( 09 Jan 2021 07:26 )   PT: 13.8 sec;   INR: 1.16 ratio;       PTT:33.1 sec      RVP:          Tox:                   RADIOLOGY & ADDITIONAL TESTS:  Results Reviewed:   Imaging Personally Reviewed:  Electrocardiogram Personally Reviewed:    COORDINATION OF CARE:  Care Discussed with Consultants/Other Providers [Y/N]:  Prior or Outpatient Records Reviewed [Y/N]:   PROGRESS NOTE:     CONTACT INFO:  Lachelle Bautista MD  Internal Medicine PGY2  Pager: [phone]    Patient is a 60y old  Female who presents with a chief complaint of Abdominal pain (08 Jan 2021 09:24)      SUBJECTIVE / OVERNIGHT EVENTS:  Patient tolerated dinner (two pieces of toast) well last night, no acute adverse event. Assessed patient at bedside this AM, denies chest pain/shortness of breath, + nausea (improved with zofran), mild LUQ abdominal pain (earlier in the morning, pain level 1 out of 10; now pain level 2 out of 10), last BM overnight.   ADDITIONAL REVIEW OF SYSTEMS:  12 point ros negative except for above    MEDICATIONS  (STANDING):  calcium acetate 1334 milliGRAM(s) Oral three times a day with meals  epoetin filiberto-epbx (RETACRIT) Injectable 85665 Unit(s) IV Push <User Schedule>  heparin   Injectable 5000 Unit(s) SubCutaneous every 12 hours  ondansetron Injectable 4 milliGRAM(s) IV Push every 6 hours  pantoprazole    Tablet 40 milliGRAM(s) Oral before breakfast  polyethylene glycol 3350 17 Gram(s) Oral daily  senna 2 Tablet(s) Oral at bedtime    MEDICATIONS  (PRN):  acetaminophen   Tablet .. 650 milliGRAM(s) Oral every 6 hours PRN Mild Pain (1 - 3), Moderate Pain (4 - 6)  HYDROmorphone   Tablet 4 milliGRAM(s) Oral every 6 hours PRN Severe Pain (7 - 10)      CAPILLARY BLOOD GLUCOSE        I&O's Summary    07 Jan 2021 07:01  -  08 Jan 2021 07:00  --------------------------------------------------------  IN: 880 mL / OUT: 200 mL / NET: 680 mL    08 Jan 2021 07:01  -  09 Jan 2021 05:23  --------------------------------------------------------  IN: 0 mL / OUT: 500 mL / NET: -500 mL        PHYSICAL EXAM:  Vital Signs Last 24 Hrs  T(C): 36.7 (09 Jan 2021 05:08), Max: 36.8 (08 Jan 2021 11:40)  T(F): 98.1 (09 Jan 2021 05:08), Max: 98.2 (08 Jan 2021 11:40)  HR: 81 (09 Jan 2021 05:08) (81 - 115)  BP: 101/67 (09 Jan 2021 05:08) (91/63 - 110/70)  BP(mean): --  RR: 18 (09 Jan 2021 05:08) (16 - 20)  SpO2: 94% (09 Jan 2021 05:08) (94% - 97%)    GENERAL: NAD  HEAD:  Atraumatic, Normocephalic  HEENT: scleral anicteric.   CV: Regular rate and rhythm. No murmurs, rubs, or gallops  Respiratory: normal respiratory effort, speaking in complete sentences. Lungs clear to auscultation bilaterally, no wheezes/crackles.  ABDOMEN: Soft, LUQ mildly tender to palpation, when pressing the other 3 quadrants the LUQ has tenderness, active bowel sounds, no rebound, no guarding, nondistended. Abd tenderness/exam same as prior days.   EXTREMITIES: No lower extremity edema. Bilateral LE symmetric in size.  NEURO: Symmetric facial expressions. Moving all ext.   Skin: No rashes    LABS:                       (01-09 @ 07:26)                      9.9  11.87 )-----------( 212                 32.4    Neutrophils = -- (--%)  Lymphocytes = -- (--%)  Eosinophils = -- (--%)  Basophils = -- (--%)  Monocytes = -- (--%)  Bands = --%    01-09    130<L>  |  87<L>  |  27<H>  ----------------------------<  84  4.1   |  27  |  5.51<H>    Ca    9.1      09 Jan 2021 07:51  Phos  5.3     01-09  Mg     2.3     01-09    TPro  7.8  /  Alb  4.3  /  TBili  4.5<H>  /  DBili  x   /  AST  18  /  ALT  8<L>  /  AlkPhos  138<H>  01-09    ( 09 Jan 2021 07:26 )   PT: 13.8 sec;   INR: 1.16 ratio;       PTT:33.1 sec      RVP:          Tox:                   RADIOLOGY & ADDITIONAL TESTS:  Results Reviewed:   Imaging Personally Reviewed:  Electrocardiogram Personally Reviewed:    COORDINATION OF CARE:  Care Discussed with Consultants/Other Providers [Y/N]: Y  Prior or Outpatient Records Reviewed [Y/N]:

## 2021-01-09 NOTE — PROGRESS NOTE ADULT - PROBLEM SELECTOR PLAN 3
-s/p liver consult, concern for non-cirrhotic portal hypertension which is related to PCV.   -s/p IR consult, plan for transjugular liver biopsy w/ pressure gradient measurements on Monday (1/11), need to coordinate with dialysis, Dialysis need to happen after IR biopsy (as contrast load will be given during biopsy)   - With IR biopsy, will need to send for reticulin stain to rule in nodular regenerative hyperplasia.  - Will also need EGD early next week - f/u with hepatology re timing

## 2021-01-10 ENCOUNTER — TRANSCRIPTION ENCOUNTER (OUTPATIENT)
Age: 61
End: 2021-01-10

## 2021-01-10 LAB
ALBUMIN SERPL ELPH-MCNC: 3.6 G/DL — SIGNIFICANT CHANGE UP (ref 3.3–5)
ALP SERPL-CCNC: 118 U/L — SIGNIFICANT CHANGE UP (ref 40–120)
ALT FLD-CCNC: 9 U/L — LOW (ref 10–45)
ANION GAP SERPL CALC-SCNC: 18 MMOL/L — HIGH (ref 5–17)
AST SERPL-CCNC: 17 U/L — SIGNIFICANT CHANGE UP (ref 10–40)
BILIRUB SERPL-MCNC: 3.2 MG/DL — HIGH (ref 0.2–1.2)
BUN SERPL-MCNC: 41 MG/DL — HIGH (ref 7–23)
CALCIUM SERPL-MCNC: 8.7 MG/DL — SIGNIFICANT CHANGE UP (ref 8.4–10.5)
CHLORIDE SERPL-SCNC: 93 MMOL/L — LOW (ref 96–108)
CO2 SERPL-SCNC: 21 MMOL/L — LOW (ref 22–31)
CREAT SERPL-MCNC: 7.7 MG/DL — HIGH (ref 0.5–1.3)
DAT POLY-SP REAG RBC QL: NEGATIVE — SIGNIFICANT CHANGE UP
GLUCOSE BLDC GLUCOMTR-MCNC: 134 MG/DL — HIGH (ref 70–99)
GLUCOSE SERPL-MCNC: 97 MG/DL — SIGNIFICANT CHANGE UP (ref 70–99)
HCT VFR BLD CALC: 27.6 % — LOW (ref 34.5–45)
HGB BLD-MCNC: 8.3 G/DL — LOW (ref 11.5–15.5)
MAGNESIUM SERPL-MCNC: 2.7 MG/DL — HIGH (ref 1.6–2.6)
MCHC RBC-ENTMCNC: 28.5 PG — SIGNIFICANT CHANGE UP (ref 27–34)
MCHC RBC-ENTMCNC: 30.1 GM/DL — LOW (ref 32–36)
MCV RBC AUTO: 94.8 FL — SIGNIFICANT CHANGE UP (ref 80–100)
NRBC # BLD: 2 /100 WBCS — HIGH (ref 0–0)
PHOSPHATE SERPL-MCNC: 5.1 MG/DL — HIGH (ref 2.5–4.5)
PLATELET # BLD AUTO: 219 K/UL — SIGNIFICANT CHANGE UP (ref 150–400)
POTASSIUM SERPL-MCNC: 4.5 MMOL/L — SIGNIFICANT CHANGE UP (ref 3.5–5.3)
POTASSIUM SERPL-SCNC: 4.5 MMOL/L — SIGNIFICANT CHANGE UP (ref 3.5–5.3)
PROT SERPL-MCNC: 7.4 G/DL — SIGNIFICANT CHANGE UP (ref 6–8.3)
RBC # BLD: 2.91 M/UL — LOW (ref 3.8–5.2)
RBC # FLD: 19.9 % — HIGH (ref 10.3–14.5)
SODIUM SERPL-SCNC: 132 MMOL/L — LOW (ref 135–145)
WBC # BLD: 9.55 K/UL — SIGNIFICANT CHANGE UP (ref 3.8–10.5)
WBC # FLD AUTO: 9.55 K/UL — SIGNIFICANT CHANGE UP (ref 3.8–10.5)

## 2021-01-10 PROCEDURE — 99232 SBSQ HOSP IP/OBS MODERATE 35: CPT | Mod: GC

## 2021-01-10 RX ORDER — HEPARIN SODIUM 5000 [USP'U]/ML
5000 INJECTION INTRAVENOUS; SUBCUTANEOUS ONCE
Refills: 0 | Status: DISCONTINUED | OUTPATIENT
Start: 2021-01-10 | End: 2021-01-10

## 2021-01-10 RX ORDER — HEPARIN SODIUM 5000 [USP'U]/ML
5000 INJECTION INTRAVENOUS; SUBCUTANEOUS ONCE
Refills: 0 | Status: COMPLETED | OUTPATIENT
Start: 2021-01-10 | End: 2021-01-10

## 2021-01-10 RX ORDER — MAGNESIUM HYDROXIDE 400 MG/1
30 TABLET, CHEWABLE ORAL ONCE
Refills: 0 | Status: COMPLETED | OUTPATIENT
Start: 2021-01-10 | End: 2021-01-10

## 2021-01-10 RX ADMIN — Medication 1334 MILLIGRAM(S): at 08:40

## 2021-01-10 RX ADMIN — HEPARIN SODIUM 5000 UNIT(S): 5000 INJECTION INTRAVENOUS; SUBCUTANEOUS at 05:27

## 2021-01-10 RX ADMIN — Medication 1334 MILLIGRAM(S): at 13:07

## 2021-01-10 RX ADMIN — PANTOPRAZOLE SODIUM 40 MILLIGRAM(S): 20 TABLET, DELAYED RELEASE ORAL at 05:27

## 2021-01-10 RX ADMIN — MAGNESIUM HYDROXIDE 30 MILLILITER(S): 400 TABLET, CHEWABLE ORAL at 18:17

## 2021-01-10 RX ADMIN — Medication 1334 MILLIGRAM(S): at 18:17

## 2021-01-10 RX ADMIN — HYDROMORPHONE HYDROCHLORIDE 4 MILLIGRAM(S): 2 INJECTION INTRAMUSCULAR; INTRAVENOUS; SUBCUTANEOUS at 22:42

## 2021-01-10 RX ADMIN — HEPARIN SODIUM 5000 UNIT(S): 5000 INJECTION INTRAVENOUS; SUBCUTANEOUS at 21:09

## 2021-01-10 RX ADMIN — SENNA PLUS 2 TABLET(S): 8.6 TABLET ORAL at 21:10

## 2021-01-10 RX ADMIN — HYDROMORPHONE HYDROCHLORIDE 4 MILLIGRAM(S): 2 INJECTION INTRAMUSCULAR; INTRAVENOUS; SUBCUTANEOUS at 02:37

## 2021-01-10 RX ADMIN — POLYETHYLENE GLYCOL 3350 17 GRAM(S): 17 POWDER, FOR SOLUTION ORAL at 13:07

## 2021-01-10 NOTE — DISCHARGE NOTE PROVIDER - NSDCMRMEDTOKEN_GEN_ALL_CORE_FT
calcium acetate 667 mg oral capsule: 2 tab(s) orally 3 times a day  pantoprazole 40 mg oral delayed release tablet: 1 tab(s) orally once a day   calcium acetate 667 mg oral capsule: 2 tab(s) orally 3 times a day  epoetin filiberto: 52302 unit(s) intravenously intradialysis  pantoprazole 40 mg oral delayed release tablet: 1 tab(s) orally once a day   calcium acetate 667 mg oral capsule: 2 tab(s) orally 3 times a day  epoetin filiberto: 55123 unit(s) intravenously intradialysis  pantoprazole 40 mg oral delayed release tablet: 1 tab(s) orally once a day  propranolol 10 mg oral tablet: 1 tab(s) orally once a day

## 2021-01-10 NOTE — PROGRESS NOTE ADULT - PROBLEM SELECTOR PLAN 9
DVT px: hep subc  Diet: Soft renal diet   Discharge: pending medical improvement. Of note patient undocumented, with no medical insurance, need to coordinate with fellows' clinic and ensure medication affordable.     Full Code DVT px: hep subc  Diet: Soft renal diet ; NPO midnight in preparation for liver biopsy.   Discharge: pending medical improvement. Of note patient undocumented, with no medical insurance, need to coordinate with fellows' clinic and ensure medication affordable.     Full Code

## 2021-01-10 NOTE — PROGRESS NOTE ADULT - SUBJECTIVE AND OBJECTIVE BOX
PROGRESS NOTE:     CONTACT INFO:  Lachelle Bautista MD  Internal Medicine PGY2  Pager: [phone]    Patient is a 60y old  Female who presents with a chief complaint of Abdominal pain (2021 05:22)      SUBJECTIVE / OVERNIGHT EVENTS:  No acute events overnight.   ADDITIONAL REVIEW OF SYSTEMS:    MEDICATIONS  (STANDING):  calcium acetate 1334 milliGRAM(s) Oral three times a day with meals  epoetin filiberto-epbx (RETACRIT) Injectable 27618 Unit(s) IV Push <User Schedule>  heparin   Injectable 5000 Unit(s) SubCutaneous every 12 hours  pantoprazole    Tablet 40 milliGRAM(s) Oral before breakfast  polyethylene glycol 3350 17 Gram(s) Oral daily  senna 2 Tablet(s) Oral at bedtime    MEDICATIONS  (PRN):  acetaminophen   Tablet .. 650 milliGRAM(s) Oral every 6 hours PRN Mild Pain (1 - 3), Moderate Pain (4 - 6)  HYDROmorphone   Tablet 4 milliGRAM(s) Oral every 6 hours PRN Severe Pain (7 - 10)      CAPILLARY BLOOD GLUCOSE        I&O's Summary    2021 07:01  -  2021 07:00  --------------------------------------------------------  IN: 0 mL / OUT: 500 mL / NET: -500 mL    2021 07:01  -  10 Alberto 2021 06:47  --------------------------------------------------------  IN: 960 mL / OUT: 200 mL / NET: 760 mL        PHYSICAL EXAM:  Vital Signs Last 24 Hrs  T(C): 36.6 (10 Alberto 2021 05:14), Max: 36.7 (2021 14:26)  T(F): 97.8 (10 Alberto 2021 05:14), Max: 98.1 (10 Alberto 2021 01:43)  HR: 72 (10 Alberto 2021 05:14) (72 - 89)  BP: 97/62 (10 Alberto 2021 05:14) (97/62 - 116/74)  BP(mean): --  RR: 18 (10 Alberto 2021 05:14) (18 - 18)  SpO2: 96% (10 Alberto 2021 05:14) (95% - 96%)        LABS:                        9.9    11.87 )-----------( 212      ( 2021 07:26 )             32.4     01-09    130<L>  |  87<L>  |  27<H>  ----------------------------<  84  4.1   |  27  |  5.51<H>    Ca    9.1      2021 07:51  Phos  5.3       Mg     2.3         TPro  7.8  /  Alb  4.3  /  TBili  4.5<H>  /  DBili  x   /  AST  18  /  ALT  8<L>  /  AlkPhos  138<H>      PT/INR - ( 2021 07:26 )   PT: 13.8 sec;   INR: 1.16 ratio         PTT - ( 2021 07:26 )  PTT:33.1 sec      Urinalysis Basic - ( 2021 12:32 )    Color: Yellow / Appearance: Slightly Turbid / S.021 / pH: x  Gluc: x / Ketone: Trace  / Bili: Negative / Urobili: Negative   Blood: x / Protein: 300 mg/dL / Nitrite: Negative   Leuk Esterase: Large / RBC: 1 /hpf /  /HPF   Sq Epi: x / Non Sq Epi: 18 /hpf / Bacteria: Many          RADIOLOGY & ADDITIONAL TESTS:  Results Reviewed:   Imaging Personally Reviewed:  Electrocardiogram Personally Reviewed:    COORDINATION OF CARE:  Care Discussed with Consultants/Other Providers [Y/N]:  Prior or Outpatient Records Reviewed [Y/N]:   PROGRESS NOTE:     CONTACT INFO:  Lachelle Bautista MD  Internal Medicine PGY2  Pager: [phone]    Patient is a 60y old  Female who presents with a chief complaint of Abdominal pain (2021 05:22)      SUBJECTIVE / OVERNIGHT EVENTS:  No acute events overnight. Patient tolerated dinner well. This morning, reports abdominal pain improved, denies nausea/vomiting, denies chest pain/shortness of breath.   ADDITIONAL REVIEW OF SYSTEMS:    MEDICATIONS  (STANDING):  calcium acetate 1334 milliGRAM(s) Oral three times a day with meals  epoetin filiberto-epbx (RETACRIT) Injectable 90114 Unit(s) IV Push <User Schedule>  heparin   Injectable 5000 Unit(s) SubCutaneous every 12 hours  pantoprazole    Tablet 40 milliGRAM(s) Oral before breakfast  polyethylene glycol 3350 17 Gram(s) Oral daily  senna 2 Tablet(s) Oral at bedtime    MEDICATIONS  (PRN):  acetaminophen   Tablet .. 650 milliGRAM(s) Oral every 6 hours PRN Mild Pain (1 - 3), Moderate Pain (4 - 6)  HYDROmorphone   Tablet 4 milliGRAM(s) Oral every 6 hours PRN Severe Pain (7 - 10)      CAPILLARY BLOOD GLUCOSE        I&O's Summary    2021 07:01  -  2021 07:00  --------------------------------------------------------  IN: 0 mL / OUT: 500 mL / NET: -500 mL    2021 07:01  -  10 Alberto 2021 06:47  --------------------------------------------------------  IN: 960 mL / OUT: 200 mL / NET: 760 mL        PHYSICAL EXAM:  Vital Signs Last 24 Hrs  T(C): 36.6 (10 Alberto 2021 05:14), Max: 36.7 (2021 14:26)  T(F): 97.8 (10 Alberto 2021 05:14), Max: 98.1 (10 Alberto 2021 01:43)  HR: 72 (10 Alberto 2021 05:14) (72 - 89)  BP: 97/62 (10 Alberto 2021 05:14) (97/62 - 116/74)  BP(mean): --  RR: 18 (10 Alberto 2021 05:14) (18 - 18)  SpO2: 96% (10 Alberto 2021 05:14) (95% - 96%)    GENERAL: NAD  HEAD:  Atraumatic, Normocephalic  HEENT: scleral anicteric.   CV: Regular rate and rhythm. No murmurs, rubs, or gallops  Respiratory: normal respiratory effort, speaking in complete sentences. Lungs clear to auscultation bilaterally, no wheezes/crackles.  ABDOMEN: Soft, nondistended, normal active bowel sounds, RUQ, RLQ and LLQ nontender to palpation, LUQ mildly tender to palpation, no rebound, no guarding.   EXTREMITIES: No lower extremity edema. Bilateral LE symmetric in size.   PSYCH: AAOx3.   NEURO: Symmetric facial expressions.   Skin: No rashes      LABS:           (01-10 @ 07:25)                      8.3  9.55 )-----------( 219                 27.6    Neutrophils = -- (--%)  Lymphocytes = -- (--%)  Eosinophils = -- (--%)  Basophils = -- (--%)  Monocytes = -- (--%)  Bands = --%    01-10    132<L>  |  93<L>  |  41<H>  ----------------------------<  97  4.5   |  21<L>  |  7.70<H>    Ca    8.7      10 Alberto 2021 07:21  Phos  5.1     01-10  Mg     2.7     01-10    TPro  7.4  /  Alb  3.6  /  TBili  3.2<H>  /  DBili  x   /  AST  17  /  ALT  9<L>  /  AlkPhos  118  01-10    ( 2021 07:26 )   PT: 13.8 sec;   INR: 1.16 ratio; PTT:33.1 sec      Urinalysis Basic - ( 2021 12:32 )    Color: Yellow / Appearance: Slightly Turbid / S.021 / pH: x  Gluc: x / Ketone: Trace  / Bili: Negative / Urobili: Negative   Blood: x / Protein: 300 mg/dL / Nitrite: Negative   Leuk Esterase: Large / RBC: 1 /hpf /  /HPF   Sq Epi: x / Non Sq Epi: 18 /hpf / Bacteria: Many        Urinalysis Basic - ( 2021 12:32 )    Color: Yellow / Appearance: Slightly Turbid / S.021 / pH: x  Gluc: x / Ketone: Trace  / Bili: Negative / Urobili: Negative   Blood: x / Protein: 300 mg/dL / Nitrite: Negative   Leuk Esterase: Large / RBC: 1 /hpf /  /HPF   Sq Epi: x / Non Sq Epi: 18 /hpf / Bacteria: Many          RADIOLOGY & ADDITIONAL TESTS:  Results Reviewed:   Imaging Personally Reviewed:  Electrocardiogram Personally Reviewed:    COORDINATION OF CARE:  Care Discussed with Consultants/Other Providers [Y/N]:  Prior or Outpatient Records Reviewed [Y/N]:   PROGRESS NOTE:     CONTACT INFO:  Lachelle Bautista MD  Internal Medicine PGY2  Pager: [phone]    Patient is a 60y old  Female who presents with a chief complaint of Abdominal pain (2021 05:22)      SUBJECTIVE / OVERNIGHT EVENTS:  No acute events overnight. Patient tolerated dinner well. This morning, reports abdominal pain improved, denies nausea/vomiting, denies chest pain/shortness of breath.   ADDITIONAL REVIEW OF SYSTEMS:  12 point ros negative except for above    MEDICATIONS  (STANDING):  calcium acetate 1334 milliGRAM(s) Oral three times a day with meals  epoetin filiberto-epbx (RETACRIT) Injectable 94928 Unit(s) IV Push <User Schedule>  heparin   Injectable 5000 Unit(s) SubCutaneous every 12 hours  pantoprazole    Tablet 40 milliGRAM(s) Oral before breakfast  polyethylene glycol 3350 17 Gram(s) Oral daily  senna 2 Tablet(s) Oral at bedtime    MEDICATIONS  (PRN):  acetaminophen   Tablet .. 650 milliGRAM(s) Oral every 6 hours PRN Mild Pain (1 - 3), Moderate Pain (4 - 6)  HYDROmorphone   Tablet 4 milliGRAM(s) Oral every 6 hours PRN Severe Pain (7 - 10)      CAPILLARY BLOOD GLUCOSE        I&O's Summary    2021 07:01  -  2021 07:00  --------------------------------------------------------  IN: 0 mL / OUT: 500 mL / NET: -500 mL    2021 07:01  -  10 Alberto 2021 06:47  --------------------------------------------------------  IN: 960 mL / OUT: 200 mL / NET: 760 mL        PHYSICAL EXAM:  Vital Signs Last 24 Hrs  T(C): 36.6 (10 Alberto 2021 05:14), Max: 36.7 (2021 14:26)  T(F): 97.8 (10 Alberto 2021 05:14), Max: 98.1 (10 Alberto 2021 01:43)  HR: 72 (10 Alberto 2021 05:14) (72 - 89)  BP: 97/62 (10 Laberto 2021 05:14) (97/62 - 116/74)  BP(mean): --  RR: 18 (10 Alberto 2021 05:14) (18 - 18)  SpO2: 96% (10 Alberto 2021 05:14) (95% - 96%)    GENERAL: NAD  HEAD:  Atraumatic, Normocephalic  HEENT: scleral anicteric.   CV: Regular rate and rhythm. No murmurs, rubs, or gallops  Respiratory: normal respiratory effort, speaking in complete sentences. Lungs clear to auscultation bilaterally, no wheezes/crackles.  ABDOMEN: Soft, nondistended, normal active bowel sounds, RUQ, RLQ and LLQ nontender to palpation, LUQ mildly tender to palpation, no rebound, no guarding.   EXTREMITIES: No lower extremity edema. Bilateral LE symmetric in size.   NEURO: Symmetric facial expressions.   Skin: No rashes      LABS:           (01-10 @ 07:25)                      8.3  9.55 )-----------( 219                 27.6    Neutrophils = -- (--%)  Lymphocytes = -- (--%)  Eosinophils = -- (--%)  Basophils = -- (--%)  Monocytes = -- (--%)  Bands = --%    10    132<L>  |  93<L>  |  41<H>  ----------------------------<  97  4.5   |  21<L>  |  7.70<H>    Ca    8.7      10 Alberto 2021 07:21  Phos  5.1     01-10  Mg     2.7     01-10    TPro  7.4  /  Alb  3.6  /  TBili  3.2<H>  /  DBili  x   /  AST  17  /  ALT  9<L>  /  AlkPhos  118  10    ( 2021 07:26 )   PT: 13.8 sec;   INR: 1.16 ratio; PTT:33.1 sec      Urinalysis Basic - ( 2021 12:32 )    Color: Yellow / Appearance: Slightly Turbid / S.021 / pH: x  Gluc: x / Ketone: Trace  / Bili: Negative / Urobili: Negative   Blood: x / Protein: 300 mg/dL / Nitrite: Negative   Leuk Esterase: Large / RBC: 1 /hpf /  /HPF   Sq Epi: x / Non Sq Epi: 18 /hpf / Bacteria: Many        Urinalysis Basic - ( 2021 12:32 )    Color: Yellow / Appearance: Slightly Turbid / S.021 / pH: x  Gluc: x / Ketone: Trace  / Bili: Negative / Urobili: Negative   Blood: x / Protein: 300 mg/dL / Nitrite: Negative   Leuk Esterase: Large / RBC: 1 /hpf /  /HPF   Sq Epi: x / Non Sq Epi: 18 /hpf / Bacteria: Many          RADIOLOGY & ADDITIONAL TESTS:  Results Reviewed:   Imaging Personally Reviewed:  Electrocardiogram Personally Reviewed:    COORDINATION OF CARE:  Care Discussed with Consultants/Other Providers [Y/N]:  Prior or Outpatient Records Reviewed [Y/N]:   PROGRESS NOTE:     CONTACT INFO:  Lachelle Bautista MD  Internal Medicine PGY2  Pager: [phone]    Patient is a 60y old  Female who presents with a chief complaint of Abdominal pain (2021 05:22)      SUBJECTIVE / OVERNIGHT EVENTS:  No acute events overnight. Patient tolerated dinner well. This morning, reports abdominal pain improved, denies nausea/vomiting, denies chest pain/shortness of breath.   ADDITIONAL REVIEW OF SYSTEMS:  12 point ros negative except for above    MEDICATIONS  (STANDING):  calcium acetate 1334 milliGRAM(s) Oral three times a day with meals  epoetin filiberto-epbx (RETACRIT) Injectable 84148 Unit(s) IV Push <User Schedule>  heparin   Injectable 5000 Unit(s) SubCutaneous every 12 hours  pantoprazole    Tablet 40 milliGRAM(s) Oral before breakfast  polyethylene glycol 3350 17 Gram(s) Oral daily  senna 2 Tablet(s) Oral at bedtime    MEDICATIONS  (PRN):  acetaminophen   Tablet .. 650 milliGRAM(s) Oral every 6 hours PRN Mild Pain (1 - 3), Moderate Pain (4 - 6)  HYDROmorphone   Tablet 4 milliGRAM(s) Oral every 6 hours PRN Severe Pain (7 - 10)      CAPILLARY BLOOD GLUCOSE        I&O's Summary    2021 07:01  -  2021 07:00  --------------------------------------------------------  IN: 0 mL / OUT: 500 mL / NET: -500 mL    2021 07:01  -  10 Alberto 2021 06:47  --------------------------------------------------------  IN: 960 mL / OUT: 200 mL / NET: 760 mL        PHYSICAL EXAM:  Vital Signs Last 24 Hrs  T(C): 36.6 (10 Alberto 2021 05:14), Max: 36.7 (2021 14:26)  T(F): 97.8 (10 Alberto 2021 05:14), Max: 98.1 (10 Alberto 2021 01:43)  HR: 72 (10 Alberto 2021 05:14) (72 - 89)  BP: 97/62 (10 Alberto 2021 05:14) (97/62 - 116/74)  BP(mean): --  RR: 18 (10 Alberto 2021 05:14) (18 - 18)  SpO2: 96% (10 Alberto 2021 05:14) (95% - 96%)    GENERAL: NAD  HEAD:  Atraumatic, Normocephalic  HEENT: scleral anicteric.   CV: Regular rate and rhythm. No murmurs, rubs, or gallops  Respiratory: normal respiratory effort, speaking in complete sentences. Lungs clear to auscultation bilaterally, no wheezes/crackles.  ABDOMEN: Soft, nondistended, normal active bowel sounds, RUQ, RLQ and LLQ nontender to palpation, LUQ mildly tender to palpation, no rebound, no guarding. Brown stool.   EXTREMITIES: No lower extremity edema. Bilateral LE symmetric in size.   NEURO: Symmetric facial expressions.   Skin: No rashes. No abdominal wall, flank, back, or thigh ecchymoses       LABS:           (01-10 @ 07:25)                      8.3  9.55 )-----------( 219                 27.6    Neutrophils = -- (--%)  Lymphocytes = -- (--%)  Eosinophils = -- (--%)  Basophils = -- (--%)  Monocytes = -- (--%)  Bands = --%    01-10    132<L>  |  93<L>  |  41<H>  ----------------------------<  97  4.5   |  21<L>  |  7.70<H>    Ca    8.7      10 Alberto 2021 07:21  Phos  5.1     01-10  Mg     2.7     01-10    TPro  7.4  /  Alb  3.6  /  TBili  3.2<H>  /  DBili  x   /  AST  17  /  ALT  9<L>  /  AlkPhos  118  01-10    ( 2021 07:26 )   PT: 13.8 sec;   INR: 1.16 ratio; PTT:33.1 sec      Urinalysis Basic - ( 2021 12:32 )    Color: Yellow / Appearance: Slightly Turbid / S.021 / pH: x  Gluc: x / Ketone: Trace  / Bili: Negative / Urobili: Negative   Blood: x / Protein: 300 mg/dL / Nitrite: Negative   Leuk Esterase: Large / RBC: 1 /hpf /  /HPF   Sq Epi: x / Non Sq Epi: 18 /hpf / Bacteria: Many        Urinalysis Basic - ( 2021 12:32 )    Color: Yellow / Appearance: Slightly Turbid / S.021 / pH: x  Gluc: x / Ketone: Trace  / Bili: Negative / Urobili: Negative   Blood: x / Protein: 300 mg/dL / Nitrite: Negative   Leuk Esterase: Large / RBC: 1 /hpf /  /HPF   Sq Epi: x / Non Sq Epi: 18 /hpf / Bacteria: Many          RADIOLOGY & ADDITIONAL TESTS:  Results Reviewed:   Imaging Personally Reviewed:  Electrocardiogram Personally Reviewed:    COORDINATION OF CARE:  Care Discussed with Consultants/Other Providers [Y/N]:  Prior or Outpatient Records Reviewed [Y/N]:

## 2021-01-10 NOTE — PROGRESS NOTE ADULT - ATTENDING COMMENTS
Patient seen and examined today. I agree with the above findings, assessment, and plan with the following additions and exceptions:     Patient reports abd pain has not changed in severity or character.   Monitor cbc. No s/s of blood loss.   Transjugular liver bx scheduled for 1/11/21  Tentative plan for EGD for variceal screening on 1/12/21  All consultant contribution to care greatly appreciated.   Rest of plan as above    Dr. Mick Khan DO  Attending Physician  Division of Hospital Medicine  [facility]  Pager:  [phone]

## 2021-01-10 NOTE — DISCHARGE NOTE PROVIDER - NSFOLLOWUPCLINICS_GEN_ALL_ED_FT
Gastroenterology at [facility]  Gastroenterology  [address] [place] [address]  Phone: [phone]  Fax:   Follow Up Time: 2 weeks

## 2021-01-10 NOTE — DISCHARGE NOTE PROVIDER - HOSPITAL COURSE
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 CT with increased splenomegaly, extensive upper abdominal varices. US abdomen doppler no vascular thrombosis, + extensive abdominal and splenic varices. NM liver and spleen scan negative for infarct. Hepatology team consulted, and concern for noncirrhotic portal hypertension, plan for liver biopsy with pressure measurement with IR on Monday 1/11 which showed __________ , and EGD 1/12 which showed __________   Course c/b 1 episode of bilious vomits on 1/7, negative abdominal x-ray series.   Labs notable for hemolytic anemia, Any negative  Hematology following.       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 CT with increased splenomegaly, extensive upper abdominal varices. US abdomen doppler no vascular thrombosis, + extensive abdominal and splenic varices. NM liver and spleen scan negative for infarct. 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 Portal hypertensive gastropathy.  Course c/b 1 episode of bilious vomits on 1/7, negative abdominal x-ray series.  Labs notable for hemolytic anemia, Any negative MMA was normal.   She is medically stable for discharge with follow up from hepatology, hematology, and her PCP.     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 CT with increased splenomegaly, extensive upper abdominal varices. US abdomen doppler no vascular thrombosis, + extensive abdominal and splenic varices. NM liver and spleen scan negative for infarct. 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 Portal hypertensive gastropathy. She has been started on  propanolol.   Course c/b 1 episode of bilious vomits on 1/7, negative abdominal x-ray series.  Labs notable for hemolytic anemia, Any negative MMA was normal.  She is medically stable for discharge with follow up from hepatology, hematology, and her PCP.

## 2021-01-10 NOTE — DISCHARGE NOTE PROVIDER - NSDCCPCAREPLAN_GEN_ALL_CORE_FT
PRINCIPAL DISCHARGE DIAGNOSIS  Diagnosis: Epigastric pain  Assessment and Plan of Treatment: You presented for abdominal pain and were found to have evidence of liver disease and elevated venous pressures of the liver. We did a liver biopsy to understand what is causing your liver to have high pressures for which you should follow up with hepatology. We also did an endoscopy which showed that you had exposed veins in your stomach from this elevated pressure which are at risk of bleeding and for which you will need repeat endoscopies in the future. If you notice dark black stool come to the hospital immediately for further evaluation.      SECONDARY DISCHARGE DIAGNOSES  Diagnosis: Polycythemia vera  Assessment and Plan of Treatment: You should follow up with hematology for management of your polycytemia vera.    Diagnosis: Splenomegaly  Assessment and Plan of Treatment: 2015    Diagnosis: Cirrhosis of liver without ascites, unspecified hepatic cirrhosis type  Assessment and Plan of Treatment:      PRINCIPAL DISCHARGE DIAGNOSIS  Diagnosis: Epigastric pain  Assessment and Plan of Treatment: You presented for abdominal pain and were found to have evidence of liver disease and elevated venous pressures of the liver. We did a liver biopsy to understand what is causing your liver to have high pressures for which you should follow up with hepatology. We also did an endoscopy which showed that you had exposed veins in your stomach from this elevated pressure which are at risk of bleeding and for which you will need repeat endoscopies in the future. If you notice dark black stool come to the hospital immediately for further evaluation.  You should continue taking your protonix to reduce the risk of bleeding from your stomach, We have also prescribed you propanolol which you should take moving forward to decrease your risk of bleeding as well.   It is important that you follow up with gastroenterology for further management of your liver disease.      SECONDARY DISCHARGE DIAGNOSES  Diagnosis: Polycythemia vera  Assessment and Plan of Treatment: You should follow up with hematology for management of your polycytemia vera.    Diagnosis: Splenomegaly  Assessment and Plan of Treatment: 2015    Diagnosis: Cirrhosis of liver without ascites, unspecified hepatic cirrhosis type  Assessment and Plan of Treatment:

## 2021-01-10 NOTE — DISCHARGE NOTE PROVIDER - CARE PROVIDERS DIRECT ADDRESSES
,DirectAddress_Unknown,[email or web address],DirectAddress_Unknown

## 2021-01-10 NOTE — DISCHARGE NOTE PROVIDER - PROVIDER TOKENS
PROVIDER:[TOKEN:[[id number]],FOLLOWUP:[2 weeks],ESTABLISHEDPATIENT:[T]],PROVIDER:[TOKEN:[[id number]],FOLLOWUP:[1 week],ESTABLISHEDPATIENT:[T]],PROVIDER:[TOKEN:[[id number]],FOLLOWUP:[1 week],ESTABLISHEDPATIENT:[T]]

## 2021-01-10 NOTE — PROGRESS NOTE ADULT - PROBLEM SELECTOR PLAN 5
t bili 5.6, d bili 0.6, , haptoglobin 22, retic 4.2   consistent with hemolytic anemia  - heme following, [place] labs consistent with hemolytic anemia  - Any negative   - heme following, appreciate recs

## 2021-01-10 NOTE — DISCHARGE NOTE PROVIDER - NSRESEARCHGRANT_PROPHYLAXISRECOMFT_GEN_A_CORE
Called and LM notifying pt of echo results. Informed him that he would need to stay on warfarin and be careful to keep INR between 2-3. Instructed him to call back at number given with any questions. BESS Nesbitt  Notes Recorded by Josias Hernandez MD on 6/22/2017 at 3:38 PM  Echo revealed resolution of LV clot.  Please update pt on results.  No change in plan.   IMPROVE-DD Application Not Available

## 2021-01-10 NOTE — DISCHARGE NOTE PROVIDER - CARE PROVIDER_API CALL
Forrest Guzmán  HEMATOLOGY  [address] [address] [place] [address]  Phone: [phone]  Fax: [phone]  Established Patient  Follow Up Time: 2 weeks    Azucena De Lóen)  Internal Medicine; Nephrology  [address] [place] [address]  Phone: [phone]  Fax: [phone]  Established Patient  Follow Up Time: 1 week    SIMONE PAEZ  [id number]  [address] [address] [place] [address]  Phone: [phone]  Fax: [phone]  Established Patient  Follow Up Time: 1 week

## 2021-01-11 ENCOUNTER — RESULT REVIEW (OUTPATIENT)
Age: 61
End: 2021-01-11

## 2021-01-11 LAB
ALBUMIN SERPL ELPH-MCNC: 3.5 G/DL — SIGNIFICANT CHANGE UP (ref 3.3–5)
ALP SERPL-CCNC: 114 U/L — SIGNIFICANT CHANGE UP (ref 40–120)
ALT FLD-CCNC: 11 U/L — SIGNIFICANT CHANGE UP (ref 10–45)
ANION GAP SERPL CALC-SCNC: 17 MMOL/L — SIGNIFICANT CHANGE UP (ref 5–17)
APTT BLD: 35.1 SEC — SIGNIFICANT CHANGE UP (ref 27.5–35.5)
AST SERPL-CCNC: 16 U/L — SIGNIFICANT CHANGE UP (ref 10–40)
BASOPHILS # BLD AUTO: 0.08 K/UL — SIGNIFICANT CHANGE UP (ref 0–0.2)
BASOPHILS NFR BLD AUTO: 0.9 % — SIGNIFICANT CHANGE UP (ref 0–2)
BILIRUB DIRECT SERPL-MCNC: 0.5 MG/DL — HIGH (ref 0–0.2)
BILIRUB SERPL-MCNC: 2.7 MG/DL — HIGH (ref 0.2–1.2)
BUN SERPL-MCNC: 48 MG/DL — HIGH (ref 7–23)
CALCIUM SERPL-MCNC: 8.5 MG/DL — SIGNIFICANT CHANGE UP (ref 8.4–10.5)
CHLORIDE SERPL-SCNC: 91 MMOL/L — LOW (ref 96–108)
CO2 SERPL-SCNC: 23 MMOL/L — SIGNIFICANT CHANGE UP (ref 22–31)
CREAT SERPL-MCNC: 8.9 MG/DL — HIGH (ref 0.5–1.3)
EOSINOPHIL # BLD AUTO: 0.27 K/UL — SIGNIFICANT CHANGE UP (ref 0–0.5)
EOSINOPHIL NFR BLD AUTO: 3 % — SIGNIFICANT CHANGE UP (ref 0–6)
GLUCOSE SERPL-MCNC: 80 MG/DL — SIGNIFICANT CHANGE UP (ref 70–99)
HCT VFR BLD CALC: 29 % — LOW (ref 34.5–45)
HGB BLD-MCNC: 8.6 G/DL — LOW (ref 11.5–15.5)
IMM GRANULOCYTES NFR BLD AUTO: 5 % — HIGH (ref 0–1.5)
INR BLD: 1.06 RATIO — SIGNIFICANT CHANGE UP (ref 0.88–1.16)
LYMPHOCYTES # BLD AUTO: 1.05 K/UL — SIGNIFICANT CHANGE UP (ref 1–3.3)
LYMPHOCYTES # BLD AUTO: 11.8 % — LOW (ref 13–44)
MAGNESIUM SERPL-MCNC: 3 MG/DL — HIGH (ref 1.6–2.6)
MCHC RBC-ENTMCNC: 28.3 PG — SIGNIFICANT CHANGE UP (ref 27–34)
MCHC RBC-ENTMCNC: 29.7 GM/DL — LOW (ref 32–36)
MCV RBC AUTO: 95.4 FL — SIGNIFICANT CHANGE UP (ref 80–100)
MONOCYTES # BLD AUTO: 0.32 K/UL — SIGNIFICANT CHANGE UP (ref 0–0.9)
MONOCYTES NFR BLD AUTO: 3.6 % — SIGNIFICANT CHANGE UP (ref 2–14)
NEUTROPHILS # BLD AUTO: 6.71 K/UL — SIGNIFICANT CHANGE UP (ref 1.8–7.4)
NEUTROPHILS NFR BLD AUTO: 75.7 % — SIGNIFICANT CHANGE UP (ref 43–77)
NRBC # BLD: 2 /100 WBCS — HIGH (ref 0–0)
PHOSPHATE SERPL-MCNC: 4.7 MG/DL — HIGH (ref 2.5–4.5)
PLATELET # BLD AUTO: 195 K/UL — SIGNIFICANT CHANGE UP (ref 150–400)
POTASSIUM SERPL-MCNC: 5 MMOL/L — SIGNIFICANT CHANGE UP (ref 3.5–5.3)
POTASSIUM SERPL-SCNC: 5 MMOL/L — SIGNIFICANT CHANGE UP (ref 3.5–5.3)
PROT SERPL-MCNC: 7.1 G/DL — SIGNIFICANT CHANGE UP (ref 6–8.3)
PROTHROM AB SERPL-ACNC: 12.7 SEC — SIGNIFICANT CHANGE UP (ref 10.6–13.6)
RBC # BLD: 3.04 M/UL — LOW (ref 3.8–5.2)
RBC # FLD: 20.1 % — HIGH (ref 10.3–14.5)
SODIUM SERPL-SCNC: 131 MMOL/L — LOW (ref 135–145)
WBC # BLD: 8.87 K/UL — SIGNIFICANT CHANGE UP (ref 3.8–10.5)
WBC # FLD AUTO: 8.87 K/UL — SIGNIFICANT CHANGE UP (ref 3.8–10.5)

## 2021-01-11 PROCEDURE — 99233 SBSQ HOSP IP/OBS HIGH 50: CPT | Mod: GC

## 2021-01-11 PROCEDURE — 36011 PLACE CATHETER IN VEIN: CPT

## 2021-01-11 PROCEDURE — 88307 TISSUE EXAM BY PATHOLOGIST: CPT | Mod: 26

## 2021-01-11 PROCEDURE — 37200 TRANSCATHETER BIOPSY: CPT

## 2021-01-11 PROCEDURE — 99232 SBSQ HOSP IP/OBS MODERATE 35: CPT | Mod: GC

## 2021-01-11 PROCEDURE — 88313 SPECIAL STAINS GROUP 2: CPT | Mod: 26

## 2021-01-11 PROCEDURE — 76937 US GUIDE VASCULAR ACCESS: CPT | Mod: 26

## 2021-01-11 PROCEDURE — 75970 VASCULAR BIOPSY: CPT | Mod: 26

## 2021-01-11 RX ADMIN — SENNA PLUS 2 TABLET(S): 8.6 TABLET ORAL at 21:40

## 2021-01-11 RX ADMIN — ERYTHROPOIETIN 10000 UNIT(S): 10000 INJECTION, SOLUTION INTRAVENOUS; SUBCUTANEOUS at 15:02

## 2021-01-11 RX ADMIN — Medication 1334 MILLIGRAM(S): at 11:29

## 2021-01-11 RX ADMIN — POLYETHYLENE GLYCOL 3350 17 GRAM(S): 17 POWDER, FOR SOLUTION ORAL at 11:29

## 2021-01-11 RX ADMIN — Medication 1334 MILLIGRAM(S): at 17:58

## 2021-01-11 RX ADMIN — HYDROMORPHONE HYDROCHLORIDE 4 MILLIGRAM(S): 2 INJECTION INTRAMUSCULAR; INTRAVENOUS; SUBCUTANEOUS at 21:38

## 2021-01-11 NOTE — PROGRESS NOTE ADULT - SUBJECTIVE AND OBJECTIVE BOX
[facility] DIVISION OF KIDNEY DISEASES AND HYPERTENSION   -- FOLLOW UP NOTE --   Fei Bruce  Nephrology Fellow  Pager NS: [phone]   /  Pager ALBERTA: [phone]  (after 5pm or weekend please page the on-call fellow)  ======================================================  24 hour events/subjective:  - overnight no events reported, vitals afebrile no hypotensive episode  - patient seen and examined at bedside this morning  - vitals/lab/medications reviewed, noted for serum K 5.0, plan for transjugular liver biopsy today and EGD tomorrow    PAST HISTORY  --------------------------------------------------------------------------------  No significant changes to PMH, PSH, FHx, SHx, unless otherwise noted    ALLERGIES & MEDICATIONS  --------------------------------------------------------------------------------  Allergies  No Known Allergies    Intolerances    Standing Inpatient Medications  calcium acetate 1334 milliGRAM(s) Oral three times a day with meals  epoetin filiberto-epbx (RETACRIT) Injectable 27468 Unit(s) IV Push <User Schedule>  pantoprazole    Tablet 40 milliGRAM(s) Oral before breakfast  polyethylene glycol 3350 17 Gram(s) Oral daily  senna 2 Tablet(s) Oral at bedtime    PRN Inpatient Medications  acetaminophen   Tablet .. 650 milliGRAM(s) Oral every 6 hours PRN  HYDROmorphone   Tablet 4 milliGRAM(s) Oral every 6 hours PRN    REVIEW OF SYSTEMS  --------------------------------------------------------------------------------  Gen: no fever, chills, weakness  Respiratory: No dyspnea, cough  CV: No chest pain, orthopnea  GI: No abdominal pain, nausea, vomiting, diarrhea  MSK: no edema  Neuro: No dizziness, lightheadedness  Heme: No bleeding  All other systems were reviewed and are negative, except as noted.    VITALS/PHYSICAL EXAM  --------------------------------------------------------------------------------  T(C): 36.7 (01-11-21 @ 07:55), Max: 36.7 (01-10-21 @ 09:52)  HR: 71 (01-11-21 @ 07:55) (71 - 77)  BP: 111/70 (01-11-21 @ 07:55) (96/61 - 121/75)  RR: 18 (01-11-21 @ 07:55) (18 - 18)  SpO2: 100% (01-11-21 @ 07:55) (96% - 100%)  Wt(kg): --  Height (cm): 160 (01-11-21 @ 07:00)  Weight (kg): 57.5 (01-11-21 @ 07:00)  BMI (kg/m2): 22.5 (01-11-21 @ 07:00)  BSA (m2): 1.59 (01-11-21 @ 07:00)    Physical Exam:  	Gen: NAD, well-appearing on room air  	HEENT: moist mucous membrane  	Pulm: CTA B/L, no crackles  	CV: RRR, S1S2; no rub/murmur  	GI: +BS, soft  	MSK: Warm, no edema              Neuro: AAOx3  	Psych: Normal affect and mood  	Skin: Warm, no cyanosis  	Vascular access: LUE AVF +thrills/bruits    LABS/STUDIES  --------------------------------------------------------------------------------              8.6    8.87  >-----------<  195      [01-11-21 @ 07:03]              29.0     131  |  91  |  48  ----------------------------<  80      [01-11-21 @ 06:59]  5.0   |  23  |  8.90        Ca     8.5     [01-11-21 @ 06:59]      Mg     3.0     [01-11-21 @ 06:59]      Phos  4.7     [01-11-21 @ 06:59]    TPro  7.1  /  Alb  3.5  /  TBili  2.7  /  DBili  0.5  /  AST  16  /  ALT  11  /  AlkPhos  114  [01-11-21 @ 06:59]    PT/INR: PT 12.7 , INR 1.06       [01-11-21 @ 07:05]  PTT: 35.1       [01-11-21 @ 07:05]    Creatinine Trend:  SCr 8.90 [01-11 @ 06:59]  SCr 7.70 [01-10 @ 07:21]  SCr 5.51 [01-09 @ 07:51]  SCr 7.28 [01-08 @ 10:03]  SCr 4.77 [01-07 @ 06:47]    Urinalysis - [01-09-21 @ 12:32]      Color Yellow / Appearance Slightly Turbid / SG 1.021 / pH 8.0      Gluc Trace / Ketone Trace  / Bili Negative / Urobili Negative       Blood Trace / Protein 300 mg/dL / Leuk Est Large / Nitrite Negative      RBC 1 /  / Hyaline 8 / Gran  / Sq Epi  / Non Sq Epi 18 / Bacteria Many    Urine Sodium 68      [01-09-21 @ 12:33]  Urine Osmolality 307      [01-09-21 @ 12:33]    Iron 56, TIBC 150, %sat 37      [01-09-21 @ 12:20]  Ferritin 640      [01-09-21 @ 11:24]  PTH -- (Ca 8.6)      [01-26-20 @ 09:35]   175  HbA1c 4.9      [06-01-19 @ 00:46]       [facility] DIVISION OF KIDNEY DISEASES AND HYPERTENSION   -- FOLLOW UP NOTE --   Fei Bruce  Nephrology Fellow  Pager NS: [phone]   /  Pager ALBERTA: [phone]  (after 5pm or weekend please page the on-call fellow)  ======================================================  24 hour events/subjective:  - overnight no events reported, vitals afebrile no hypotensive episode  - vitals/lab/medications reviewed, noted for serum K 5.0, plan for transjugular liver biopsy today and EGD tomorrow    PAST HISTORY  --------------------------------------------------------------------------------  No significant changes to PMH, PSH, FHx, SHx, unless otherwise noted    ALLERGIES & MEDICATIONS  --------------------------------------------------------------------------------  Allergies  No Known Allergies    Intolerances    Standing Inpatient Medications  calcium acetate 1334 milliGRAM(s) Oral three times a day with meals  epoetin filiberto-epbx (RETACRIT) Injectable 42836 Unit(s) IV Push <User Schedule>  pantoprazole    Tablet 40 milliGRAM(s) Oral before breakfast  polyethylene glycol 3350 17 Gram(s) Oral daily  senna 2 Tablet(s) Oral at bedtime    PRN Inpatient Medications  acetaminophen   Tablet .. 650 milliGRAM(s) Oral every 6 hours PRN  HYDROmorphone   Tablet 4 milliGRAM(s) Oral every 6 hours PRN    REVIEW OF SYSTEMS  --------------------------------------------------------------------------------  Gen: no fever, chills, weakness  Respiratory: No dyspnea, cough  CV: No chest pain, orthopnea  GI: No abdominal pain, nausea, vomiting, diarrhea  MSK: no edema  Neuro: No dizziness, lightheadedness  Heme: No bleeding  All other systems were reviewed and are negative, except as noted.    VITALS/PHYSICAL EXAM  --------------------------------------------------------------------------------  T(C): 36.7 (01-11-21 @ 07:55), Max: 36.7 (01-10-21 @ 09:52)  HR: 71 (01-11-21 @ 07:55) (71 - 77)  BP: 111/70 (01-11-21 @ 07:55) (96/61 - 121/75)  RR: 18 (01-11-21 @ 07:55) (18 - 18)  SpO2: 100% (01-11-21 @ 07:55) (96% - 100%)  Wt(kg): --  Height (cm): 160 (01-11-21 @ 07:00)  Weight (kg): 57.5 (01-11-21 @ 07:00)  BMI (kg/m2): 22.5 (01-11-21 @ 07:00)  BSA (m2): 1.59 (01-11-21 @ 07:00)    Physical Exam:  	Gen: NAD, well-appearing on room air  	HEENT: moist mucous membrane  	Pulm: CTA B/L, no crackles  	CV: RRR, S1S2; no rub/murmur  	GI: +BS, soft  	MSK: Warm, no edema              Neuro: AAOx3  	Psych: Normal affect and mood  	Skin: Warm, no cyanosis  	Vascular access: LUE AVF +thrills/bruits    LABS/STUDIES  --------------------------------------------------------------------------------              8.6    8.87  >-----------<  195      [01-11-21 @ 07:03]              29.0     131  |  91  |  48  ----------------------------<  80      [01-11-21 @ 06:59]  5.0   |  23  |  8.90        Ca     8.5     [01-11-21 @ 06:59]      Mg     3.0     [01-11-21 @ 06:59]      Phos  4.7     [01-11-21 @ 06:59]    TPro  7.1  /  Alb  3.5  /  TBili  2.7  /  DBili  0.5  /  AST  16  /  ALT  11  /  AlkPhos  114  [01-11-21 @ 06:59]    PT/INR: PT 12.7 , INR 1.06       [01-11-21 @ 07:05]  PTT: 35.1       [01-11-21 @ 07:05]    Creatinine Trend:  SCr 8.90 [01-11 @ 06:59]  SCr 7.70 [01-10 @ 07:21]  SCr 5.51 [01-09 @ 07:51]  SCr 7.28 [01-08 @ 10:03]  SCr 4.77 [01-07 @ 06:47]    Urinalysis - [01-09-21 @ 12:32]      Color Yellow / Appearance Slightly Turbid / SG 1.021 / pH 8.0      Gluc Trace / Ketone Trace  / Bili Negative / Urobili Negative       Blood Trace / Protein 300 mg/dL / Leuk Est Large / Nitrite Negative      RBC 1 /  / Hyaline 8 / Gran  / Sq Epi  / Non Sq Epi 18 / Bacteria Many    Urine Sodium 68      [01-09-21 @ 12:33]  Urine Osmolality 307      [01-09-21 @ 12:33]    Iron 56, TIBC 150, %sat 37      [01-09-21 @ 12:20]  Ferritin 640      [01-09-21 @ 11:24]  PTH -- (Ca 8.6)      [01-26-20 @ 09:35]   175  HbA1c 4.9      [06-01-19 @ 00:46]

## 2021-01-11 NOTE — PROCEDURE NOTE - PROCEDURE FINDINGS AND DETAILS
Patent RIJ vein successfully accessed. Portosystemic pressure gradient measured to be 9mmHg. Successful liver biopsies obtained x 3. No complications.

## 2021-01-11 NOTE — PROGRESS NOTE ADULT - ATTENDING COMMENTS
Patient seen and examined today.    Monitor cbc. No s/s of blood loss.   S/P Transjugular liver bx 1/11/21. F/U pathology results.  Plan for EGD for variceal screening on 1/12/21. NPO after MN.  HD plan per nephrology team.  All consultant contribution to care greatly appreciated.

## 2021-01-11 NOTE — PROGRESS NOTE ADULT - ATTENDING COMMENTS
S/p transjugular liver biopsy today with HVPG 9 mmHg but awaiting full procedure report re: free and wedged hepatic vein pressures as well as venography results to rule out any post-sinusoidal cause of portal hypertension. Will follow-up histology for further evaluation of presumed non-cirrhotic portal hypertension. Although HVPG only mildly elevated, there may be pre-sinusoidal component of portal hypertension as well and therefore will proceed with plan for EGD tomorrow for variceal screening.

## 2021-01-11 NOTE — PROGRESS NOTE ADULT - ASSESSMENT
60F PMHx ESRD HD ([facility], [place], Dr De León), cirrhosis, JAK2+ PCV with splenomegaly - admitted for abdominal pain  ruled out splenic infarct.  Nephrology consulted for ESRD on HD via LUE AVF.      # ESRD   Pt. with ESRD on HD three times a week ([facility] @ [place], Dr. De León). Last HD was on 1/6/21 via LUE AVF, target/dry weight 55kg. Labs reviewed.   - plan for maintenance HD today UF goal 1L as BP tolerated  - monitor BMP, fluid restriction 1.2 liters per day, renally dose medications per HD, renal diet    # Hypertension  BP in acceptable range off anti-hypertensive med, continue to monitor BP    # Anemia   Pt. with anemia in the setting of ESRD Hgb level at acceptable target.   - continue epogen 10,000U on HD days and monitor Hgb    # Renal bone disease  Pt. with hyperphosphatemia in the setting of ESRD  - continue calcium acetate 1334 mg TID with meals. Low phosphorus diet advised. Monitor serum phosphorus 60F PMHx ESRD HD ([facility], [place], Dr De León), cirrhosis, JAK2+ PCV with splenomegaly - admitted for abdominal pain  ruled out splenic infarct.  Nephrology consulted for ESRD on HD via LUE AVF.      # ESRD   Pt. with ESRD on HD three times a week ([facility] [place], Dr. De León). Last HD was on 1/6/21 via LUE AVF, target/dry weight 55kg. Labs reviewed.   - plan for maintenance HD today UF goal 1L as BP tolerated  - monitor BMP, fluid restriction 1.2 liters per day, renally dose medications per HD, renal diet    # Hypertension  BP in acceptable range off anti-hypertensive med, continue to monitor BP    # Anemia   Pt. with anemia in the setting of ESRD Hgb level at acceptable target.   - continue epogen 10,000U on HD days and monitor Hgb    # Renal bone disease  Pt. with hyperphosphatemia in the setting of ESRD  - continue calcium acetate 1334 mg TID with meals. Low phosphorus diet advised. Monitor serum phosphorus

## 2021-01-11 NOTE — PROGRESS NOTE ADULT - ASSESSMENT
60 y/oF with hx of HTN, pancreatic cysts, cirrhosis, ESRD on HD (MWF), JAK2+ PCV with splenomegaly presenting with abdominal pain, and with concern for non-cirrhotic portal hypertension    Impression:  # Portal hypertension: Evidence of abdominal varices since 2014, however, with progressive increase in size.  # PCV JAK2 +. Hematology requested.  # ESRD on HD (MWF). Nephrology was consulted.    Recommendations:  - F/U nephrology and Heme/Onc recommendations  - F/U transjugular liver bx and send for reticulin stain to rule in nodular regenerative hyperplasia - scheduled for 1/11/21  - Tenatively plan for EGD for variceal screening on Tuesday 1/12/21  - F/U iron panel, MMA, and folic acid  - Rest of the care per primary team    Gavin Abraham MD  Gastroenterology and Hepatology Fellow  Please contact via [employer] Teams    Please call Hepatology ([phone]) if there are any additional questions or concerns.    Please call answering service for on-call GI fellow ([phone]) after 5pm and before 8am, and on weekends.     60 y/oF with hx of HTN, pancreatic cysts, cirrhosis, ESRD on HD (MWF), JAK2+ PCV with splenomegaly presenting with abdominal pain, and with concern for non-cirrhotic portal hypertension    Impression:  # Portal hypertension: Evidence of abdominal varices since 2014, however, with progressive increase in size and now with hepatomegaly. Minimal concern for liver fibrosis, however, may represent non-cirrhotic portal hypertension  # PCV JAK2 +: Hematology following. Previously on Hydoxyurea. Previously received therapeutic phlebotomy, however, not needed in setting of anemia  # ESRD on HD (MWF). Nephrology was consulted.    Recommendations:  - F/U nephrology and Heme/Onc recommendations  - F/U transjugular liver bx and send for reticulin stain to rule in nodular regenerative hyperplasia - scheduled for 1/11/21  - Tentatively plan for EGD for variceal screening on Tuesday 1/12/21  - F/U iron panel, MMA, and folic acid  - Rest of the care per primary team    Gavin Abraham MD  Gastroenterology and Hepatology Fellow  Please contact via [employer] Teams    Please call Hepatology ([phone]) if there are any additional questions or concerns.    Please call answering service for on-call GI fellow ([phone]) after 5pm and before 8am, and on weekends.

## 2021-01-11 NOTE — PROGRESS NOTE ADULT - SUBJECTIVE AND OBJECTIVE BOX
PROGRESS NOTE:     SUBJECTIVE / OVERNIGHT EVENTS:  ON  hydromomorphone 4mg PO for 4/10  L shoulder pain, now resolved. . 1 BM this AM.    Patient seen and evaluated at bedside. No fever/chills.  Denies SOB at rest, chest pain, palpitations, nausea/vomiting       MEDICATIONS  (STANDING):  calcium acetate 1334 milliGRAM(s) Oral three times a day with meals  epoetin filiberto-epbx (RETACRIT) Injectable 27761 Unit(s) IV Push <User Schedule>  pantoprazole    Tablet 40 milliGRAM(s) Oral before breakfast  polyethylene glycol 3350 17 Gram(s) Oral daily  senna 2 Tablet(s) Oral at bedtime    MEDICATIONS  (PRN):  acetaminophen   Tablet .. 650 milliGRAM(s) Oral every 6 hours PRN Mild Pain (1 - 3), Moderate Pain (4 - 6)  HYDROmorphone   Tablet 4 milliGRAM(s) Oral every 6 hours PRN Severe Pain (7 - 10)      CAPILLARY BLOOD GLUCOSE      POCT Blood Glucose.: 134 mg/dL (10 Alberto 2021 17:11)    I&O's Summary      PHYSICAL EXAM:  Vital Signs Last 24 Hrs  T(C): 36.7 (2021 07:55), Max: 36.7 (10 Alberto 2021 09:52)  T(F): 98.1 (2021 07:55), Max: 98.1 (2021 07:55)  HR: 71 (2021 07:55) (71 - 77)  BP: 111/70 (2021 07:55) (96/61 - 121/75)  BP(mean): --  RR: 18 (2021 07:55) (18 - 18)  SpO2: 100% (2021 07:55) (96% - 100%)  GENERAL: NAD  HEAD:  Atraumatic, Normocephalic  HEENT: scleral anicteric.   CV: Regular rate and rhythm. No murmurs, rubs, or gallops  Respiratory: normal respiratory effort, speaking in complete sentences. Lungs clear to auscultation bilaterally, no wheezes/crackles.  ABDOMEN: Soft, nondistended, normal active bowel sounds, RUQ, RLQ and LLQ nontender to palpation, LUQ mildly tender to palpation, no rebound, no guarding. Brown stool.   EXTREMITIES: No lower extremity edema. Bilateral LE symmetric in size. : LUE AVF +thrills/bruits  NEURO: Symmetric facial expressions.   Skin: No rashes. No abdominal wall, flank, back, or thigh ecchymoses     LABS:                        8.6    8.87  )-----------( 195      ( 2021 07:03 )             29.0         131<L>  |  91<L>  |  48<H>  ----------------------------<  80  5.0   |  23  |  8.90<H>    Ca    8.5      2021 06:59  Phos  4.7       Mg     3.0         TPro  7.1  /  Alb  3.5  /  TBili  2.7<H>  /  DBili  0.5<H>  /  AST  16  /  ALT  11  /  AlkPhos  114      PT/INR - ( 2021 07:05 )   PT: 12.7 sec;   INR: 1.06 ratio         PTT - ( 2021 07:05 )  PTT:35.1 sec      Urinalysis Basic - ( 2021 12:32 )    Color: Yellow / Appearance: Slightly Turbid / S.021 / pH: x  Gluc: x / Ketone: Trace  / Bili: Negative / Urobili: Negative   Blood: x / Protein: 300 mg/dL / Nitrite: Negative   Leuk Esterase: Large / RBC: 1 /hpf /  /HPF   Sq Epi: x / Non Sq Epi: 18 /hpf / Bacteria: Many

## 2021-01-11 NOTE — PROGRESS NOTE ADULT - SUBJECTIVE AND OBJECTIVE BOX
Chief Complaint: Abdominal pain  Reason for consult: Abnormal imaging    Interval Events:   - The patient endorsed some mild left sided abdominal pain, unchanged  - Patient is planned for transjugular liver biopsy with pressure measurements today    Allergies:  No Known Allergies    MEDICATIONS  (STANDING):  calcium acetate 1334 milliGRAM(s) Oral three times a day with meals  epoetin filiberto-epbx (RETACRIT) Injectable 56721 Unit(s) IV Push <User Schedule>  pantoprazole    Tablet 40 milliGRAM(s) Oral before breakfast  polyethylene glycol 3350 17 Gram(s) Oral daily  senna 2 Tablet(s) Oral at bedtime    MEDICATIONS  (PRN):  acetaminophen   Tablet .. 650 milliGRAM(s) Oral every 6 hours PRN Mild Pain (1 - 3), Moderate Pain (4 - 6)  HYDROmorphone   Tablet 4 milliGRAM(s) Oral every 6 hours PRN Severe Pain (7 - 10)    PMHX/PSHX:  Pancreatic cyst    Cirrhosis of liver without ascites, unspecified hepatic cirrhosis type    ESRD on peritoneal dialysis    Splenic infarct    Splenomegaly    Hypertension    Peptic ulcer disease    Polycythemia vera    AV fistula    Hemoperitoneum as complication of peritoneal dialysis    Peritoneal dialysis catheter in place    No significant past surgical history    Family history:  Family history of malignant neoplasm (Grandparent)    Family history of hypertension in mother    No pertinent family history in first degree relatives    ROS:     General:  No wt loss, fevers, chills, night sweats, fatigue,   Eyes:  Good vision, no reported pain  ENT:  No sore throat, pain, runny nose, dysphagia  CV:  No pain, palpitations, hypo/hypertension  Pulm:  No dyspnea, cough, tachypnea, wheezing  GI:  + pain, No nausea, No vomiting, No diarrhea, No constipation, No weight loss, No fever, No pruritis, No rectal bleeding, No tarry stools, No dysphagia  :  No pain, bleeding, incontinence, nocturia  Muscle:  No pain, weakness  Neuro:  No weakness, tingling, memory problems  Psych:  No fatigue, insomnia, mood problems, depression  Endocrine:  No polyuria, polydipsia, cold/heat intolerance  Heme:  No petechiae, ecchymosis, easy bruisability  Skin:  No rash, tattoos, scars, edema    PHYSICAL EXAM:   Vital Signs:  Vital Signs Last 24 Hrs  T(C): 36.8 (2021 05:00), Max: 36.9 (2021 14:25)  T(F): 98.3 (2021 05:00), Max: 98.5 (2021 20:53)  HR: 84 (2021 05:00) (84 - 94)  BP: 105/68 (2021 05:00) (104/68 - 124/82)  BP(mean): --  RR: 18 (2021 05:00) (16 - 20)  SpO2: 94% (2021 05:00) (93% - 99%)    GENERAL:  No acute distress  HEENT:  Normocephalic/atraumatic, + scleral icterus  CHEST:  Normal effort, no accessory muscle use  ABDOMEN:  Soft, mild left sided tenderness, non-distended  EXTREMITIES:  No cyanosis, clubbing, or edema  SKIN:  No rash/erythema  NEURO:  Alert and oriented x 3, no asterixis    LABS:                        8.6    8.87  )-----------( 195      ( 2021 07:03 )             29.0     11    131<L>  |  91<L>  |  48<H>  ----------------------------<  80  5.0   |  23  |  8.90<H>    Ca    8.5      2021 06:59  Phos  4.7     -11  Mg     3.0     11    TPro  7.1  /  Alb  3.5  /  TBili  2.7<H>  /  DBili  0.5<H>  /  AST  16  /  ALT  11  /  AlkPhos  114  -11    LIVER FUNCTIONS - ( 2021 06:59 )  Alb: 3.5 g/dL / Pro: 7.1 g/dL / ALK PHOS: 114 U/L / ALT: 11 U/L / AST: 16 U/L / GGT: x           PT/INR - ( 2021 07:05 )   PT: 12.7 sec;   INR: 1.06 ratio         PTT - ( 2021 07:05 )  PTT:35.1 sec  Urinalysis Basic - ( 2021 12:32 )    Color: Yellow / Appearance: Slightly Turbid / S.021 / pH: x  Gluc: x / Ketone: Trace  / Bili: Negative / Urobili: Negative   Blood: x / Protein: 300 mg/dL / Nitrite: Negative   Leuk Esterase: Large / RBC: 1 /hpf /  /HPF   Sq Epi: x / Non Sq Epi: 18 /hpf / Bacteria: Many    Imaging:    Reviewed

## 2021-01-11 NOTE — PROGRESS NOTE ADULT - ATTENDING COMMENTS
ESRD on HD-HD MWF; plan HD today  epigastric pain- ongoing hx GERD/epigastric pain/early satiety/weight loss- GI eval; hepatology follow up; for transjugular liver biopsy today; may need EGD   splenomegaly     Anemia due to CKD- monitor Hb; on EPO 10k tiw with HD; continue; on Venofer 50mg weekly as outpatient  Secondary hyperpth renal origin- on hectorol 1.5mcg with hd as outpt  hyperphosphatemia-on phoslo- with meals; continue

## 2021-01-11 NOTE — PROGRESS NOTE ADULT - PROBLEM SELECTOR PLAN 9
DVT px: hep subc  Diet: Soft renal diet ; NPO midnight in preparation for liver biopsy.   Discharge: pending medical improvement. Of note patient undocumented, with no medical insurance, need to coordinate with fellows' clinic and ensure medication affordable.     Full Code

## 2021-01-12 LAB
ALBUMIN SERPL ELPH-MCNC: 3.9 G/DL — SIGNIFICANT CHANGE UP (ref 3.3–5)
ALP SERPL-CCNC: 122 U/L — HIGH (ref 40–120)
ALT FLD-CCNC: 19 U/L — SIGNIFICANT CHANGE UP (ref 10–45)
ANION GAP SERPL CALC-SCNC: 14 MMOL/L — SIGNIFICANT CHANGE UP (ref 5–17)
APTT BLD: 34.7 SEC — SIGNIFICANT CHANGE UP (ref 27.5–35.5)
AST SERPL-CCNC: 30 U/L — SIGNIFICANT CHANGE UP (ref 10–40)
BASOPHILS # BLD AUTO: 0.13 K/UL — SIGNIFICANT CHANGE UP (ref 0–0.2)
BASOPHILS NFR BLD AUTO: 1.1 % — SIGNIFICANT CHANGE UP (ref 0–2)
BILIRUB SERPL-MCNC: 2.6 MG/DL — HIGH (ref 0.2–1.2)
BLD GP AB SCN SERPL QL: NEGATIVE — SIGNIFICANT CHANGE UP
BUN SERPL-MCNC: 23 MG/DL — SIGNIFICANT CHANGE UP (ref 7–23)
CALCIUM SERPL-MCNC: 8.9 MG/DL — SIGNIFICANT CHANGE UP (ref 8.4–10.5)
CHLORIDE SERPL-SCNC: 94 MMOL/L — LOW (ref 96–108)
CO2 SERPL-SCNC: 26 MMOL/L — SIGNIFICANT CHANGE UP (ref 22–31)
CREAT SERPL-MCNC: 5.81 MG/DL — HIGH (ref 0.5–1.3)
EOSINOPHIL # BLD AUTO: 0.22 K/UL — SIGNIFICANT CHANGE UP (ref 0–0.5)
EOSINOPHIL NFR BLD AUTO: 1.8 % — SIGNIFICANT CHANGE UP (ref 0–6)
GLUCOSE SERPL-MCNC: 105 MG/DL — HIGH (ref 70–99)
HCT VFR BLD CALC: 31.7 % — LOW (ref 34.5–45)
HGB BLD-MCNC: 9.7 G/DL — LOW (ref 11.5–15.5)
IMM GRANULOCYTES NFR BLD AUTO: 4.3 % — HIGH (ref 0–1.5)
INR BLD: 1.14 RATIO — SIGNIFICANT CHANGE UP (ref 0.88–1.16)
LYMPHOCYTES # BLD AUTO: 0.94 K/UL — LOW (ref 1–3.3)
LYMPHOCYTES # BLD AUTO: 7.8 % — LOW (ref 13–44)
MAGNESIUM SERPL-MCNC: 2.7 MG/DL — HIGH (ref 1.6–2.6)
MCHC RBC-ENTMCNC: 29 PG — SIGNIFICANT CHANGE UP (ref 27–34)
MCHC RBC-ENTMCNC: 30.6 GM/DL — LOW (ref 32–36)
MCV RBC AUTO: 94.9 FL — SIGNIFICANT CHANGE UP (ref 80–100)
METHYLMALONATE SERPL-SCNC: 1550 NMOL/L — HIGH (ref 0–378)
MONOCYTES # BLD AUTO: 0.61 K/UL — SIGNIFICANT CHANGE UP (ref 0–0.9)
MONOCYTES NFR BLD AUTO: 5.1 % — SIGNIFICANT CHANGE UP (ref 2–14)
NEUTROPHILS # BLD AUTO: 9.63 K/UL — HIGH (ref 1.8–7.4)
NEUTROPHILS NFR BLD AUTO: 79.9 % — HIGH (ref 43–77)
NRBC # BLD: 1 /100 WBCS — HIGH (ref 0–0)
PHOSPHATE SERPL-MCNC: 4 MG/DL — SIGNIFICANT CHANGE UP (ref 2.5–4.5)
PLATELET # BLD AUTO: 177 K/UL — SIGNIFICANT CHANGE UP (ref 150–400)
POTASSIUM SERPL-MCNC: 4.3 MMOL/L — SIGNIFICANT CHANGE UP (ref 3.5–5.3)
POTASSIUM SERPL-SCNC: 4.3 MMOL/L — SIGNIFICANT CHANGE UP (ref 3.5–5.3)
PROT SERPL-MCNC: 7.1 G/DL — SIGNIFICANT CHANGE UP (ref 6–8.3)
PROTHROM AB SERPL-ACNC: 13.6 SEC — SIGNIFICANT CHANGE UP (ref 10.6–13.6)
RBC # BLD: 3.34 M/UL — LOW (ref 3.8–5.2)
RBC # FLD: 20.5 % — HIGH (ref 10.3–14.5)
RH IG SCN BLD-IMP: POSITIVE — SIGNIFICANT CHANGE UP
SARS-COV-2 RNA SPEC QL NAA+PROBE: SIGNIFICANT CHANGE UP
SODIUM SERPL-SCNC: 134 MMOL/L — LOW (ref 135–145)
WBC # BLD: 12.05 K/UL — HIGH (ref 3.8–10.5)
WBC # FLD AUTO: 12.05 K/UL — HIGH (ref 3.8–10.5)

## 2021-01-12 PROCEDURE — 99233 SBSQ HOSP IP/OBS HIGH 50: CPT | Mod: GC

## 2021-01-12 PROCEDURE — 99231 SBSQ HOSP IP/OBS SF/LOW 25: CPT

## 2021-01-12 PROCEDURE — 43235 EGD DIAGNOSTIC BRUSH WASH: CPT | Mod: GC

## 2021-01-12 RX ORDER — HEPARIN SODIUM 5000 [USP'U]/ML
5000 INJECTION INTRAVENOUS; SUBCUTANEOUS EVERY 8 HOURS
Refills: 0 | Status: DISCONTINUED | OUTPATIENT
Start: 2021-01-12 | End: 2021-01-13

## 2021-01-12 RX ORDER — SODIUM CHLORIDE 9 MG/ML
500 INJECTION INTRAMUSCULAR; INTRAVENOUS; SUBCUTANEOUS
Refills: 0 | Status: DISCONTINUED | OUTPATIENT
Start: 2021-01-12 | End: 2021-01-12

## 2021-01-12 RX ADMIN — Medication 1334 MILLIGRAM(S): at 17:17

## 2021-01-12 RX ADMIN — Medication 1334 MILLIGRAM(S): at 14:17

## 2021-01-12 RX ADMIN — SENNA PLUS 2 TABLET(S): 8.6 TABLET ORAL at 21:57

## 2021-01-12 NOTE — PROGRESS NOTE ADULT - ATTENDING COMMENTS
Monitor cbc. No s/s of blood loss.   S/P Transjugular liver bx 1/11/21. F/U pathology results.  Plan for EGD for variceal screening today. F/U final report.   Continue HD per nephrology team.  All consultant contribution to care greatly appreciated.

## 2021-01-12 NOTE — PROGRESS NOTE ADULT - PROBLEM SELECTOR PLAN 6
- [facility] AVF  -MWF dialysis, last on Monday (1/4) outpatient  -s/p nephro consult, HD M/W/F while inpatient  - Last HD 1/11

## 2021-01-12 NOTE — PROGRESS NOTE ADULT - PROBLEM SELECTOR PLAN 3
-s/p liver consult, concern for non-cirrhotic portal hypertension which is related to PCV.   - s/p liver bx 1/11  - EGD 1/12

## 2021-01-12 NOTE — PROGRESS NOTE ADULT - SUBJECTIVE AND OBJECTIVE BOX
PROGRESS NOTE:     SUBJECTIVE / OVERNIGHT EVENTS:    Spoken to with  ID # [id number]  Tolerated HD yesterday. Hydromorphone 4mg ON for R neck 4/10 pain, now resolved.   Patient seen and evaluated at bedside. No fever/chills.  Denies SOB at rest, chest pain, palpitations, abdominal pain, nausea/vomiting         MEDICATIONS  (STANDING):  calcium acetate 1334 milliGRAM(s) Oral three times a day with meals  epoetin filiberto-epbx (RETACRIT) Injectable 01396 Unit(s) IV Push <User Schedule>  pantoprazole    Tablet 40 milliGRAM(s) Oral before breakfast  polyethylene glycol 3350 17 Gram(s) Oral daily  senna 2 Tablet(s) Oral at bedtime    MEDICATIONS  (PRN):  acetaminophen   Tablet .. 650 milliGRAM(s) Oral every 6 hours PRN Mild Pain (1 - 3), Moderate Pain (4 - 6)  HYDROmorphone   Tablet 4 milliGRAM(s) Oral every 6 hours PRN Severe Pain (7 - 10)      CAPILLARY BLOOD GLUCOSE        I&O's Summary    11 Jan 2021 07:01  -  12 Jan 2021 07:00  --------------------------------------------------------  IN: 240 mL / OUT: 1000 mL / NET: -760 mL        PHYSICAL EXAM:  Vital Signs Last 24 Hrs  T(C): 36.9 (12 Jan 2021 04:34), Max: 36.9 (12 Jan 2021 01:36)  T(F): 98.5 (12 Jan 2021 04:34), Max: 98.5 (12 Jan 2021 01:36)  HR: 68 (12 Jan 2021 04:34) (63 - 96)  BP: 94/60 (12 Jan 2021 01:36) (94/60 - 127/78)  BP(mean): 82 (11 Jan 2021 10:53) (73 - 82)  RR: 18 (12 Jan 2021 04:34) (13 - 18)  SpO2: 94% (12 Jan 2021 04:34) (94% - 100%)    GENERAL: NAD  HEAD:  Atraumatic, Normocephalic  HEENT: scleral anicteric.   CV: Regular rate and rhythm. No murmurs, rubs, or gallops  Respiratory: normal respiratory effort, speaking in complete sentences. Lungs clear to auscultation bilaterally, no wheezes/crackles.  ABDOMEN: Soft, nondistended, normal active bowel sounds, RUQ, RLQ and LLQ nontender to palpation, LUQ mildly tender to palpation, no rebound, no guarding. Brown stool.   EXTREMITIES: No lower extremity edema. Bilateral LE symmetric in size. : LUE AVF +thrills/bruits  NEURO: Symmetric facial expressions.   Skin: No rashes. No abdominal wall, flank, back, or thigh ecchymoses     LABS:                        8.6    8.87  )-----------( 195      ( 11 Jan 2021 07:03 )             29.0     01-12    134<L>  |  94<L>  |  23  ----------------------------<  105<H>  4.3   |  26  |  5.81<H>    Ca    8.9      12 Jan 2021 07:25  Phos  4.0     01-12  Mg     2.7     01-12    TPro  7.1  /  Alb  3.9  /  TBili  2.6<H>  /  DBili  x   /  AST  30  /  ALT  19  /  AlkPhos  122<H>  01-12    PT/INR - ( 11 Jan 2021 07:05 )   PT: 12.7 sec;   INR: 1.06 ratio         PTT - ( 11 Jan 2021 07:05 )  PTT:35.1 sec

## 2021-01-12 NOTE — PRE PROCEDURE NOTE - PRE PROCEDURE EVALUATION
Interventional Radiology Pre-Procedure Note    This is a 60y F with ESRD and portal hypertension presenting for a transjugular liver biopsy and portosystemic pressure gradient measurements to identify cause of portal hypertension.     Procedure: Transjugular liver biopsy and jordan-systemic pressure gradient measurement.    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    Splenomegaly  2015    Hypertension    Peptic ulcer disease    Polycythemia vera    AV fistula  Placed 9/18    Hemoperitoneum as complication of peritoneal dialysis  s/p removal 9/18    Peritoneal dialysis catheter in place  Placed 8/9/2017         Female    Allergies: No Known Allergies      LABS:  CBC Full  -  ( 11 Jan 2021 07:03 )  WBC Count : 8.87 K/uL  RBC Count : 3.04 M/uL  Hemoglobin : 8.6 g/dL  Hematocrit : 29.0 %  Platelet Count - Automated : 195 K/uL    01-11    131<L>  |  91<L>  |  48<H>  ----------------------------<  80  5.0   |  23  |  8.90<H>    Ca    8.5      11 Jan 2021 06:59  Phos  4.7     01-11  Mg     3.0     01-11    TPro  7.1  /  Alb  3.5  /  TBili  2.7<H>  /  DBili  0.5<H>  /  AST  16  /  ALT  11  /  AlkPhos  114  01-11    PT/INR - ( 11 Jan 2021 07:05 )   PT: 12.7 sec;   INR: 1.06 ratio         PTT - ( 11 Jan 2021 07:05 )  PTT:35.1 sec    Plan:  -Transjugular liver biopsy and jordan-systemic pressure gradient measurement.  -Procedure/ risks/ benefits were explained, informed consent obtained from patient, verbalizes understanding. 
                                                                                              Pre-Endoscopy Evaluation                            Indication for Procedure: Variceal screening    Sedation by Anesthesia [X ]    PAST MEDICAL & SURGICAL HISTORY:  Pancreatic cyst    Cirrhosis of liver without ascites, unspecified hepatic cirrhosis type    ESRD on peritoneal dialysis    Splenic infarct  treated conservatively 2015    Splenomegaly  2015    Hypertension    Peptic ulcer disease    Polycythemia vera    AV fistula  Placed     Hemoperitoneum as complication of peritoneal dialysis  s/p removal     Peritoneal dialysis catheter in place  Placed 2017    Latex allergy: [ ] yes [X] no    Smoking: [ ] yes  [X] no    AICD/PPM: [ ] yes   [X] no    Pertinent lab data:                        8.6    8.87  )-----------( 195      ( 2021 07:03 )             29.0     01-12    134<L>  |  94<L>  |  23  ----------------------------<  105<H>  4.3   |  26  |  5.81<H>    Ca    8.9      2021 07:25  Phos  4.0     12  Mg     2.7     12    TPro  7.1  /  Alb  3.9  /  TBili  2.6<H>  /  DBili  x   /  AST  30  /  ALT  19  /  AlkPhos  122<H>  0112    PT/INR - ( 2021 07:05 )   PT: 12.7 sec;   INR: 1.06 ratio         PTT - ( 2021 07:05 )  PTT:35.1 sec    Physical Examination:  Daily Weight in k.6 (2021 17:15)  Vital Signs Last 24 Hrs  T(C): 36.9 (2021 04:34), Max: 36.9 (2021 01:36)  T(F): 98.5 (2021 04:34), Max: 98.5 (2021 01:36)  HR: 68 (2021 04:34) (63 - 96)  BP: 94/60 (2021 01:36) (94/60 - 127/78)  BP(mean): 82 (2021 10:53) (73 - 82)  RR: 18 (2021 04:34) (13 - 18)  SpO2: 94% (2021 04:34) (94% - 100%)    Constitutional: NAD    HEENT: PERRLA, EOMI,       Neck:  No JVD    Respiratory: CTAB/L    Cardiovascular: S1 and S2    Gastrointestinal: BS+, soft, NT/ND    Extremities: No peripheral edema    Neurological: A/O x 3, no focal deficits    Psychiatric: Normal mood, normal affect    : No Stoddard    Skin: No rashes    Comments:    ASA Class: I [ ]  II [ ]  III [X]  IV [ ]    The patient is a suitable candidate for the planned procedure unless box checked [ ]  No, explain:

## 2021-01-12 NOTE — PROGRESS NOTE ADULT - SUBJECTIVE AND OBJECTIVE BOX
Interventional Radiology Follow- Up Note    60y Female s/p  transjugular liver biopsy  on 1/11  in Interventional Radiology with Dr. Manjarrez.   Reports lower back pain and pelvic pain over night with pain meds.     Vitals: T(F): 97.8 (01-12-21 @ 09:53), Max: 98.5 (01-12-21 @ 01:36)  HR: 81 (01-12-21 @ 09:53) (63 - 96)  BP: 104/67 (01-12-21 @ 09:53) (94/60 - 127/78)  RR: 18 (01-12-21 @ 09:53) (14 - 18)  SpO2: 96% (01-12-21 @ 09:53) (94% - 100%)  Wt(kg): --    LABS:                        9.7    12.05 )-----------( 177      ( 12 Jan 2021 08:51 )             31.7     01-12    134<L>  |  94<L>  |  23  ----------------------------<  105<H>  4.3   |  26  |  5.81<H>    Ca    8.9      12 Jan 2021 07:25  Phos  4.0     01-12  Mg     2.7     01-12    TPro  7.1  /  Alb  3.9  /  TBili  2.6<H>  /  DBili  x   /  AST  30  /  ALT  19  /  AlkPhos  122<H>  01-12    PT/INR - ( 11 Jan 2021 07:05 )   PT: 12.7 sec;   INR: 1.06 ratio         PTT - ( 11 Jan 2021 07:05 )  PTT:35.1 sec      PHYSICAL EXAM:  General: Nontoxic, in NAD  Neuro:  Alert & oriented x 3  Abdomen: soft, NTND. pt states that she had pain with ttp.   Neck: dressing removed. No hematoma/ttp.    Impression: 60y Female with hx of splenomegaly, portal HTN s/p transjugular liver biopsy on 1/11. Reports lower back pain and abdominal pain that resolved with pain meds overnight.    - Pain control as needed.   - No concern for bleeding as H&H is stable and VSS and TJ is low risk for bleeding.  - D/w Dr. Manjarrez.      Please call IR at extension [phone] or [phone] with any questions, concerns, or issues regarding above.

## 2021-01-12 NOTE — PRE-OP CHECKLIST - TAMPON REMOVED
Lab called and states all blood work that was sent was hemolysis ed, lab at bedside for recollect    
n/a
n/a

## 2021-01-13 ENCOUNTER — TRANSCRIPTION ENCOUNTER (OUTPATIENT)
Age: 61
End: 2021-01-13

## 2021-01-13 VITALS
RESPIRATION RATE: 18 BRPM | HEART RATE: 73 BPM | SYSTOLIC BLOOD PRESSURE: 113 MMHG | DIASTOLIC BLOOD PRESSURE: 74 MMHG | OXYGEN SATURATION: 99 %

## 2021-01-13 LAB
ALBUMIN SERPL ELPH-MCNC: 3.9 G/DL — SIGNIFICANT CHANGE UP (ref 3.3–5)
ALP SERPL-CCNC: 120 U/L — SIGNIFICANT CHANGE UP (ref 40–120)
ALT FLD-CCNC: 21 U/L — SIGNIFICANT CHANGE UP (ref 10–45)
ANION GAP SERPL CALC-SCNC: 16 MMOL/L — SIGNIFICANT CHANGE UP (ref 5–17)
APTT BLD: 33.6 SEC — SIGNIFICANT CHANGE UP (ref 27.5–35.5)
AST SERPL-CCNC: 19 U/L — SIGNIFICANT CHANGE UP (ref 10–40)
BASOPHILS # BLD AUTO: 0.07 K/UL — SIGNIFICANT CHANGE UP (ref 0–0.2)
BASOPHILS NFR BLD AUTO: 0.6 % — SIGNIFICANT CHANGE UP (ref 0–2)
BILIRUB SERPL-MCNC: 3.5 MG/DL — HIGH (ref 0.2–1.2)
BUN SERPL-MCNC: 38 MG/DL — HIGH (ref 7–23)
CALCIUM SERPL-MCNC: 8.8 MG/DL — SIGNIFICANT CHANGE UP (ref 8.4–10.5)
CHLORIDE SERPL-SCNC: 94 MMOL/L — LOW (ref 96–108)
CO2 SERPL-SCNC: 24 MMOL/L — SIGNIFICANT CHANGE UP (ref 22–31)
CREAT SERPL-MCNC: 7.7 MG/DL — HIGH (ref 0.5–1.3)
EOSINOPHIL # BLD AUTO: 0.27 K/UL — SIGNIFICANT CHANGE UP (ref 0–0.5)
EOSINOPHIL NFR BLD AUTO: 2.3 % — SIGNIFICANT CHANGE UP (ref 0–6)
GLUCOSE SERPL-MCNC: 135 MG/DL — HIGH (ref 70–99)
HCT VFR BLD CALC: 29.7 % — LOW (ref 34.5–45)
HGB BLD-MCNC: 9 G/DL — LOW (ref 11.5–15.5)
IMM GRANULOCYTES NFR BLD AUTO: 5.5 % — HIGH (ref 0–1.5)
INR BLD: 1.12 RATIO — SIGNIFICANT CHANGE UP (ref 0.88–1.16)
LYMPHOCYTES # BLD AUTO: 0.93 K/UL — LOW (ref 1–3.3)
LYMPHOCYTES # BLD AUTO: 8 % — LOW (ref 13–44)
MAGNESIUM SERPL-MCNC: 3 MG/DL — HIGH (ref 1.6–2.6)
MCHC RBC-ENTMCNC: 28.6 PG — SIGNIFICANT CHANGE UP (ref 27–34)
MCHC RBC-ENTMCNC: 30.3 GM/DL — LOW (ref 32–36)
MCV RBC AUTO: 94.3 FL — SIGNIFICANT CHANGE UP (ref 80–100)
MONOCYTES # BLD AUTO: 0.59 K/UL — SIGNIFICANT CHANGE UP (ref 0–0.9)
MONOCYTES NFR BLD AUTO: 5 % — SIGNIFICANT CHANGE UP (ref 2–14)
NEUTROPHILS # BLD AUTO: 9.19 K/UL — HIGH (ref 1.8–7.4)
NEUTROPHILS NFR BLD AUTO: 78.6 % — HIGH (ref 43–77)
NRBC # BLD: 1 /100 WBCS — HIGH (ref 0–0)
PHOSPHATE SERPL-MCNC: 5.1 MG/DL — HIGH (ref 2.5–4.5)
PLATELET # BLD AUTO: 184 K/UL — SIGNIFICANT CHANGE UP (ref 150–400)
POTASSIUM SERPL-MCNC: 4 MMOL/L — SIGNIFICANT CHANGE UP (ref 3.5–5.3)
POTASSIUM SERPL-SCNC: 4 MMOL/L — SIGNIFICANT CHANGE UP (ref 3.5–5.3)
PROT SERPL-MCNC: 7.1 G/DL — SIGNIFICANT CHANGE UP (ref 6–8.3)
PROTHROM AB SERPL-ACNC: 13.4 SEC — SIGNIFICANT CHANGE UP (ref 10.6–13.6)
RBC # BLD: 3.15 M/UL — LOW (ref 3.8–5.2)
RBC # FLD: 20.4 % — HIGH (ref 10.3–14.5)
SODIUM SERPL-SCNC: 134 MMOL/L — LOW (ref 135–145)
WBC # BLD: 11.69 K/UL — HIGH (ref 3.8–10.5)
WBC # FLD AUTO: 11.69 K/UL — HIGH (ref 3.8–10.5)

## 2021-01-13 PROCEDURE — 93975 VASCULAR STUDY: CPT

## 2021-01-13 PROCEDURE — 83540 ASSAY OF IRON: CPT

## 2021-01-13 PROCEDURE — 96375 TX/PRO/DX INJ NEW DRUG ADDON: CPT | Mod: XU

## 2021-01-13 PROCEDURE — 83615 LACTATE (LD) (LDH) ENZYME: CPT

## 2021-01-13 PROCEDURE — 82962 GLUCOSE BLOOD TEST: CPT

## 2021-01-13 PROCEDURE — 86850 RBC ANTIBODY SCREEN: CPT

## 2021-01-13 PROCEDURE — 82607 VITAMIN B-12: CPT

## 2021-01-13 PROCEDURE — 86900 BLOOD TYPING SEROLOGIC ABO: CPT

## 2021-01-13 PROCEDURE — 83921 ORGANIC ACID SINGLE QUANT: CPT

## 2021-01-13 PROCEDURE — 88313 SPECIAL STAINS GROUP 2: CPT

## 2021-01-13 PROCEDURE — 85025 COMPLETE CBC W/AUTO DIFF WBC: CPT

## 2021-01-13 PROCEDURE — 99233 SBSQ HOSP IP/OBS HIGH 50: CPT

## 2021-01-13 PROCEDURE — 82248 BILIRUBIN DIRECT: CPT

## 2021-01-13 PROCEDURE — 81001 URINALYSIS AUTO W/SCOPE: CPT

## 2021-01-13 PROCEDURE — 83880 ASSAY OF NATRIURETIC PEPTIDE: CPT

## 2021-01-13 PROCEDURE — 96374 THER/PROPH/DIAG INJ IV PUSH: CPT | Mod: XU

## 2021-01-13 PROCEDURE — 83735 ASSAY OF MAGNESIUM: CPT

## 2021-01-13 PROCEDURE — 78830 RP LOCLZJ TUM SPECT W/CT 1: CPT

## 2021-01-13 PROCEDURE — 83930 ASSAY OF BLOOD OSMOLALITY: CPT

## 2021-01-13 PROCEDURE — 75970 VASCULAR BIOPSY: CPT

## 2021-01-13 PROCEDURE — 99285 EMERGENCY DEPT VISIT HI MDM: CPT | Mod: 25

## 2021-01-13 PROCEDURE — 37200 TRANSCATHETER BIOPSY: CPT

## 2021-01-13 PROCEDURE — A9541: CPT

## 2021-01-13 PROCEDURE — 99261: CPT

## 2021-01-13 PROCEDURE — 83550 IRON BINDING TEST: CPT

## 2021-01-13 PROCEDURE — 86665 EPSTEIN-BARR CAPSID VCA: CPT

## 2021-01-13 PROCEDURE — 82668 ASSAY OF ERYTHROPOIETIN: CPT

## 2021-01-13 PROCEDURE — 86880 COOMBS TEST DIRECT: CPT

## 2021-01-13 PROCEDURE — 86901 BLOOD TYPING SEROLOGIC RH(D): CPT

## 2021-01-13 PROCEDURE — 99232 SBSQ HOSP IP/OBS MODERATE 35: CPT | Mod: GC

## 2021-01-13 PROCEDURE — 82728 ASSAY OF FERRITIN: CPT

## 2021-01-13 PROCEDURE — 86663 EPSTEIN-BARR ANTIBODY: CPT

## 2021-01-13 PROCEDURE — C1887: CPT

## 2021-01-13 PROCEDURE — 84484 ASSAY OF TROPONIN QUANT: CPT

## 2021-01-13 PROCEDURE — 83935 ASSAY OF URINE OSMOLALITY: CPT

## 2021-01-13 PROCEDURE — 85045 AUTOMATED RETICULOCYTE COUNT: CPT

## 2021-01-13 PROCEDURE — 74018 RADEX ABDOMEN 1 VIEW: CPT

## 2021-01-13 PROCEDURE — 86664 EPSTEIN-BARR NUCLEAR ANTIGEN: CPT

## 2021-01-13 PROCEDURE — 99239 HOSP IP/OBS DSCHRG MGMT >30: CPT | Mod: GC

## 2021-01-13 PROCEDURE — U0003: CPT

## 2021-01-13 PROCEDURE — 84300 ASSAY OF URINE SODIUM: CPT

## 2021-01-13 PROCEDURE — 85730 THROMBOPLASTIN TIME PARTIAL: CPT

## 2021-01-13 PROCEDURE — 93005 ELECTROCARDIOGRAM TRACING: CPT

## 2021-01-13 PROCEDURE — C1769: CPT

## 2021-01-13 PROCEDURE — U0005: CPT

## 2021-01-13 PROCEDURE — 36011 PLACE CATHETER IN VEIN: CPT

## 2021-01-13 PROCEDURE — 80053 COMPREHEN METABOLIC PANEL: CPT

## 2021-01-13 PROCEDURE — 78215 LVR&SPLEEN IMG STATIC ONLY: CPT

## 2021-01-13 PROCEDURE — 88307 TISSUE EXAM BY PATHOLOGIST: CPT

## 2021-01-13 PROCEDURE — 87635 SARS-COV-2 COVID-19 AMP PRB: CPT

## 2021-01-13 PROCEDURE — 76937 US GUIDE VASCULAR ACCESS: CPT

## 2021-01-13 PROCEDURE — 74177 CT ABD & PELVIS W/CONTRAST: CPT

## 2021-01-13 PROCEDURE — 83690 ASSAY OF LIPASE: CPT

## 2021-01-13 PROCEDURE — C1894: CPT

## 2021-01-13 PROCEDURE — 71045 X-RAY EXAM CHEST 1 VIEW: CPT

## 2021-01-13 PROCEDURE — 85610 PROTHROMBIN TIME: CPT

## 2021-01-13 PROCEDURE — 83010 ASSAY OF HAPTOGLOBIN QUANT: CPT

## 2021-01-13 PROCEDURE — 84100 ASSAY OF PHOSPHORUS: CPT

## 2021-01-13 PROCEDURE — 82746 ASSAY OF FOLIC ACID SERUM: CPT

## 2021-01-13 RX ORDER — PROPRANOLOL HCL 160 MG
1 CAPSULE, EXTENDED RELEASE 24HR ORAL
Qty: 30 | Refills: 0
Start: 2021-01-13 | End: 2021-02-11

## 2021-01-13 RX ORDER — ERYTHROPOIETIN 10000 [IU]/ML
10000 INJECTION, SOLUTION INTRAVENOUS; SUBCUTANEOUS
Qty: 0 | Refills: 0 | DISCHARGE
Start: 2021-01-13

## 2021-01-13 RX ADMIN — ERYTHROPOIETIN 10000 UNIT(S): 10000 INJECTION, SOLUTION INTRAVENOUS; SUBCUTANEOUS at 14:02

## 2021-01-13 RX ADMIN — PANTOPRAZOLE SODIUM 40 MILLIGRAM(S): 20 TABLET, DELAYED RELEASE ORAL at 07:04

## 2021-01-13 NOTE — PROGRESS NOTE ADULT - ATTENDING COMMENTS
#ESRD on HD-HD MWF; plan HD today  #epigastric pain- ongoing hx GERD/epigastric pain/early satiety/weight loss-  hepatology and heme following; s/p transjugular liver biopsy 1/11; EGD   splenomegaly   concern bc portal hypertension ?cirrhosis vs transformation of PCV to myelofibrosis; oputpt follow up  #Anemia due to CKD- monitor Hb; on EPO 10k tiw with HD; continue; on Venofer 50mg weekly as outpatient  #Secondary hyperpth renal origin- on hectorol 1.5mcg with hd as outpt  #hyperphosphatemia-on phoslo- with meals; continue

## 2021-01-13 NOTE — PROGRESS NOTE ADULT - ASSESSMENT
60yoF with Hx of HTN, ESRD on HD (MWF), JAK2+ PCV with splenomegaly presenting with abdominal pain x1 day.     #Abdominal pain, possibly 2/2 splenic infarct vs acute on chronic splenic thrombosis  - Doppler studies negative for thrombus  - Unclear etiology of splenomegaly. W/u per primary team  - agree with cirrhosis workup, per patient she has never seen hepatology in past and does not know of a cirrhosis dx    #JAK2+ PCV  - she is noncompliant with hydrea, has not seen heme in 1 year  - at this time does not need phlebotomy, she does not have erythrocytosis  - Concern that splenomegaly may be due to transformation to myelofibrosis, however, will await results of liver disease w/u.  - will discuss resuming hydrea as outpt  - Will need outpatient follow-up with Dr. Brant Flores MD  Hematology/Oncology Fellow, PGY-4  Pager: [phone]  After 5pm and on weekends please page on-call fellow   60yoF with Hx of HTN, ESRD on HD (MWF), JAK2+ PCV with splenomegaly presenting with abdominal pain x1 day.     #Abdominal pain, possibly 2/2 splenic infarct vs acute on chronic splenic thrombosis  - Doppler studies negative for thrombus  - Unclear etiology of splenomegaly. W/u per primary team  - agree with cirrhosis workup, per patient she has never seen hepatology in past and does not know of a cirrhosis dx  -EGD showing isolated gastric varices  -s/p transjugular liver bx on 1/11--path pending    #JAK2+ PCV  - she is noncompliant with hydrea, has not seen heme in 1 year  - at this time does not need phlebotomy, she does not have erythrocytosis  - Concern that splenomegaly may be due to transformation to myelofibrosis, however, will await results of liver disease w/u.  - will discuss resuming hydrea as outpt  - Will need outpatient follow-up with Dr. Brant Flores MD  Hematology/Oncology Fellow, PGY-4  Pager: [phone]  After 5pm and on weekends please page on-call fellow

## 2021-01-13 NOTE — DIETITIAN INITIAL EVALUATION ADULT. - PROBLEM SELECTOR PLAN 1
-in LUQ with radiation to the left shoulder suggestive of possible splenic pain in the setting of findings on CT an no other definitive source thus far  -GI consult in AM  -low likelihood for infection as afebrile and without infectious finding on CT, however will test for CMV, EBV  -dilaudid 1mg IV q4h PRN for now, transition to PO if patient tolerates diet  -clear liquid diet for now  -obtain hepatic/splenic doppler US

## 2021-01-13 NOTE — PROGRESS NOTE ADULT - PROBLEM SELECTOR PLAN 8
-not currently on therapy  -s/p heme consult, appreciate recs  -Has not followed with Heme for over one year, per heme, will likely need repeat bone marrow biopsy, and will discuss resume hydrea outpatient .

## 2021-01-13 NOTE — DIETITIAN NUTRITION RISK NOTIFICATION - TREATMENT: THE FOLLOWING DIET HAS BEEN RECOMMENDED
Diet, Renal Restrictions:   For patients receiving Renal Replacement - No Protein Restr, No Conc K, No Conc Phos, Low Sodium  1200mL Fluid Restriction ([id number]) (01-08-21 @ 11:03) [Active]

## 2021-01-13 NOTE — PROGRESS NOTE ADULT - ASSESSMENT
60F PMHx ESRD HD ([facility], [place], Dr De León), cirrhosis, JAK2+ PCV with splenomegaly - admitted for abdominal pain  ruled out splenic infarct.  Nephrology consulted for ESRD on HD via LUE AVF.      # ESRD   Pt. with ESRD on HD three times a week ([facility] @ [place], Dr. De León). Last HD was on 1/6/21 via LUE AVF, target/dry weight 55kg. Labs reviewed.   - plan for maintenance HD today UF goal 1L as BP tolerated  - monitor BMP, fluid restriction 1.2 liters per day, renally dose medications per HD, renal diet    # Hypertension  BP in acceptable range off anti-hypertensive med, continue to monitor BP    # Anemia   Pt. with anemia in the setting of ESRD Hgb level at acceptable target.   - continue epogen 10,000U on HD days and monitor Hgb    # Renal bone disease  Pt. with hyperphosphatemia in the setting of ESRD  - continue calcium acetate 1334 mg TID with meals. Low phosphorus diet advised. Monitor serum phosphorus

## 2021-01-13 NOTE — PROGRESS NOTE ADULT - ASSESSMENT
60 y/oF with hx of HTN, pancreatic cysts, cirrhosis, ESRD on HD (MWF), JAK2+ PCV with splenomegaly presenting with abdominal pain, and with concern for non-cirrhotic portal hypertension    Impression:  # Portal hypertension: Evidence of abdominal varices since 2014, however, with progressive increase in size and now with hepatomegaly. Minimal concern for liver fibrosis, however, may represent non-cirrhotic portal hypertension. S/p EGD with isolated gastric varices.  # PCV JAK2 +: Hematology following. Previously on Hydoxyurea. Previously received therapeutic phlebotomy, however, not needed in setting of anemia  # ESRD on HD (MWF). Nephrology was consulted.    Recommendations:  - F/U nephrology and Heme/Onc recommendations  - F/U transjugular liver bx and send for reticulin stain to rule in nodular regenerative hyperplasia - scheduled for 1/11/21  - F/U iron panel, MMA, and folic acid  - Rest of the care per primary team 60 y/oF with hx of HTN, pancreatic cysts, cirrhosis, ESRD on HD (MWF), JAK2+ PCV with splenomegaly presenting with abdominal pain, and with concern for non-cirrhotic portal hypertension    Impression:  # Portal hypertension: Evidence of abdominal varices since 2014, however, with progressive increase in size and now with hepatomegaly. Minimal concern for liver fibrosis, however, may represent non-cirrhotic portal hypertension. S/p EGD with isolated gastric varices.  # PCV JAK2 +: Hematology following. Previously on Hydoxyurea. Previously received therapeutic phlebotomy, however, not needed in setting of anemia  # ESRD on HD (MWF). Nephrology was consulted.    Recommendations:  - start nadolol 20mg daily if BP and HR allow, goal HR 60  - F/U nephrology and Heme/Onc recommendations  - F/U transjugular liver bx and send for reticulin stain to rule in nodular regenerative hyperplasia - scheduled for 1/11/21  - F/U iron panel, MMA, and folic acid  - Rest of the care per primary team      Junie Jc PGY-4  Gastroenterology Fellow  Pager #[phone] or [phone]  Please page on-call Fellow on weekends/after 5 pm on weekdays   Please call answering service for on-call GI fellow ([phone]) after 5pm and before 8am, and on weekends.

## 2021-01-13 NOTE — PROGRESS NOTE ADULT - PROBLEM SELECTOR PLAN 3
-s/p liver consult, concern for non-cirrhotic portal hypertension which is related to PCV.   - s/p liver bx 1/11  - EGD 1/12 w/ Type 1 and 2 isolated gastric varices (IGV1 and IGV2, varices located in  the fundus and proximal gastric body), without bleeding.  - Portal hypertensive gastropathy. -s/p liver consult, concern for non-cirrhotic portal hypertension which is related to PCV.   - s/p liver bx 1/11  - EGD 1/12 w/ Type 1 and 2 isolated gastric varices (IGV1 and IGV2, varices located in  the fundus and proximal gastric body), without bleeding. Portal hypertensive gastropathy.  - [facility] wnl

## 2021-01-13 NOTE — DIETITIAN INITIAL EVALUATION ADULT. - ADD RECOMMEND
4.) Encourage PO intake of meals and snacks 5.) RD/dietetic intern to obtain and honor food preferences as available 6.) Reinforce diet education PRN 7.) Monitor pt's PO intake, weight, skin, edema, GI distress

## 2021-01-13 NOTE — DIETITIAN INITIAL EVALUATION ADULT. - CONTINUE CURRENT NUTRITION CARE PLAN
1.) Continue with current renal and low sodium diet as ordered./yes 1.) Continue with current renal and low sodium diet as ordered. Defer fluid restriction to team./yes

## 2021-01-13 NOTE — DIETITIAN INITIAL EVALUATION ADULT. - OTHER INFO
Pt reports taking a nutritional supplement for the kidney recommended by her MD however does not recall the name. Per H&P, was taking calcium acetate PTA. Reports fair intake in house. Reports feeling nauseas after HD.  Of note, pt was on clear liquid diet 1/7-1/8 and NPO 1/11-1/12 for procedure. Noted with variable intake in nursing flow sheets (0-100%). Per RN, pt ate 75% of breakfast this morning.     Noted with current dosing wt 120.7lbs (1/12). Wt hx in house as follows: 115.7lbs (1/6); 120.5lbs (1/8); 119.4lbs (1/8); 120.7lbs (1/11); 118.1 (1/11); 115.9 (1/11); 120.7 (1/12). Wt fluctuations likely due to fluid shift in setting of  ESRD on HD. Pt reports 11lb (8.4%) wt loss x 2 months - clinically significant.    Provided education on renal diet restrictions including limiting sodium, potassium, and phosphorous. Emphasized the importance of adequate energy and protein intake for weight maintenance and preservation of lean body mass. Pt receptive to education and verbalized understanding. Pt reports taking a nutritional supplement for the kidney recommended by her MD however does not recall the name. Per H&P, was taking calcium acetate PTA. Reports fair intake in house. Reports feeling nauseas after HD.  Of note, pt was on clear liquid diet 1/7-1/8 and NPO 1/11-1/12 for procedure. Noted with variable intake in nursing flow sheets (0-100%). Per RN, pt ate 75% of breakfast this morning.     Noted with current dosing wt 120.7lbs (1/12). Wt hx in house as follows: 115.7lbs (1/6); 120.5lbs pre HD (1/8); 119.4lbs post HD (1/8); 120.7lbs (1/11); 118.1 Pre HD (1/11); 115.9 Post HD (1/11); 120.7 (1/12), 120.1 Pre HD (1/13) Wt fluctuations likely due to fluid shift in setting of  ESRD on HD. Pt reports 11lb (8.4%) wt loss x 2 months - clinically significant.    Provided education on renal diet restrictions including limiting sodium, potassium, and phosphorous. Emphasized the importance of adequate energy and protein intake for weight maintenance and preservation of lean body mass. Pt receptive to education and verbalized understanding.

## 2021-01-13 NOTE — PROGRESS NOTE ADULT - ATTENDING COMMENTS
Patient was seen and examined.    Monitor cbc. No s/s of blood loss.   S/P Transjugular liver bx 1/11/21. F/U pathology results.  EGD: normal esophagus, type 1,2 gastric varices. Portal hypertension gastropathy.  EGD results discussed with patient.  Start low dose beta blocker. Monitor BP, HR  Continue HD per nephrology team.  All consultant contribution to care greatly appreciated.      Discharge planning >40minutes.

## 2021-01-13 NOTE — PROGRESS NOTE ADULT - SUBJECTIVE AND OBJECTIVE BOX
Chief Complaint:  Patient is a 60y old  Female who presents with a chief complaint of Abdominal pain (13 Jan 2021 07:20)      Interval Events:       Hospital Medications:  acetaminophen   Tablet .. 650 milliGRAM(s) Oral every 6 hours PRN  calcium acetate 1334 milliGRAM(s) Oral three times a day with meals  epoetin filiberto-epbx (RETACRIT) Injectable 37120 Unit(s) IV Push <User Schedule>  heparin   Injectable 5000 Unit(s) SubCutaneous every 8 hours  HYDROmorphone   Tablet 4 milliGRAM(s) Oral every 6 hours PRN  pantoprazole    Tablet 40 milliGRAM(s) Oral before breakfast  polyethylene glycol 3350 17 Gram(s) Oral daily  senna 2 Tablet(s) Oral at bedtime      PMHX/PSHX:  Pancreatic cyst    Cirrhosis of liver without ascites, unspecified hepatic cirrhosis type    ESRD on peritoneal dialysis    Splenic infarct    Splenomegaly    Hypertension    Peptic ulcer disease    Polycythemia vera    AV fistula    Hemoperitoneum as complication of peritoneal dialysis    Peritoneal dialysis catheter in place    No significant past surgical history            ROS:     General:  No weight loss, fevers, chills, night sweats, fatigue   Eyes:  No vision changes  ENT:  No sore throat, pain, runny nose  CV:  No chest pain, palpitations, dizziness   Resp:  No SOB, cough, wheezing  GI:  See HPI  :  No burning with urination, hematuria  Muscle:  No pain, weakness  Neuro:  No weakness/tingling, memory problems  Psych:  No fatigue, insomnia, mood problems, depression  Heme:  No easy bruisability  Skin:  No rash, edema      PHYSICAL EXAM:     GENERAL:  Well developed, no distress  HEENT:  NC/AT,  conjunctivae clear, sclera anicteric  CHEST:  Full & symmetric excursion, no increased effort w/ respirations  HEART:  Regular rhythm & rate  ABDOMEN:  Soft, non-tender, non-distended  EXTREMITIES:  no LE  edema  SKIN:  No rash/erythema/ecchymoses/petechiae/wounds/jaundice  NEURO:  Alert, oriented    Vital Signs:  Vital Signs Last 24 Hrs  T(C): 36.8 (13 Jan 2021 04:26), Max: 36.8 (13 Jan 2021 01:07)  T(F): 98.3 (13 Jan 2021 04:26), Max: 98.3 (13 Jan 2021 04:26)  HR: 78 (13 Jan 2021 04:26) (75 - 87)  BP: 109/68 (13 Jan 2021 04:26) (89/61 - 127/92)  BP(mean): --  RR: 18 (13 Jan 2021 04:26) (13 - 19)  SpO2: 95% (13 Jan 2021 04:26) (95% - 99%)  Daily Height in cm: 160.02 (12 Jan 2021 11:08)    Daily     LABS:                        9.0    11.69 )-----------( 184      ( 13 Jan 2021 07:24 )             29.7     01-12    134<L>  |  94<L>  |  23  ----------------------------<  105<H>  4.3   |  26  |  5.81<H>    Ca    8.9      12 Jan 2021 07:25  Phos  4.0     01-12  Mg     2.7     01-12    TPro  7.1  /  Alb  3.9  /  TBili  2.6<H>  /  DBili  x   /  AST  30  /  ALT  19  /  AlkPhos  122<H>  01-12    LIVER FUNCTIONS - ( 12 Jan 2021 07:25 )  Alb: 3.9 g/dL / Pro: 7.1 g/dL / ALK PHOS: 122 U/L / ALT: 19 U/L / AST: 30 U/L / GGT: x           PT/INR - ( 13 Jan 2021 07:25 )   PT: 13.4 sec;   INR: 1.12 ratio         PTT - ( 13 Jan 2021 07:25 )  PTT:33.6 sec        Imaging:    < from: Upper Endoscopy (01.12.21 @ 11:10) >  Findings:       The examined esophagus was normal. No esophageal varices were seen.       Type 1 and 2 isolated gastric varices (IGV1 and IGV2, varices located in the fundus and        extending into gastric body) with no bleeding were found in the gastric fundus and proximal        body. There were no stigmata of recent bleeding. They were small to mediumin largest        diameter.       Mild portal hypertensive gastropathy was found in the gastric fundus, in the gastric body and        in the gastric antrum.       The examined duodenum was normal.                                                    Impression:          - Normal esophagus.                       - Type 1 and 2 isolated gastric varices (IGV1 and IGV2, varices located in                        the fundus and proximal gastric body), without bleeding.                       - Portal hypertensive gastropathy.                       - Normal examined duodenum.                       - No specimens collected.    < end of copied text >           Chief Complaint:  Patient is a 60y old  Female who presents with a chief complaint of Abdominal pain (13 Jan 2021 07:20)      Interval Events: no acute events overnight  - [employer]  ID# [id number]  - s/p EGD yesterday with isolated gastric varices, no active bleeding  - reports she feels well today, no abd pain, n/v, fevers, chills      Hospital Medications:  acetaminophen   Tablet .. 650 milliGRAM(s) Oral every 6 hours PRN  calcium acetate 1334 milliGRAM(s) Oral three times a day with meals  epoetin filiberto-epbx (RETACRIT) Injectable 00704 Unit(s) IV Push <User Schedule>  heparin   Injectable 5000 Unit(s) SubCutaneous every 8 hours  HYDROmorphone   Tablet 4 milliGRAM(s) Oral every 6 hours PRN  pantoprazole    Tablet 40 milliGRAM(s) Oral before breakfast  polyethylene glycol 3350 17 Gram(s) Oral daily  senna 2 Tablet(s) Oral at bedtime      PMHX/PSHX:  Pancreatic cyst    Cirrhosis of liver without ascites, unspecified hepatic cirrhosis type    ESRD on peritoneal dialysis    Splenic infarct    Splenomegaly    Hypertension    Peptic ulcer disease    Polycythemia vera    AV fistula    Hemoperitoneum as complication of peritoneal dialysis    Peritoneal dialysis catheter in place    No significant past surgical history            ROS:     General:  No weight loss, fevers, chills, night sweats, fatigue   Eyes:  No vision changes  ENT:  No sore throat, pain, runny nose  CV:  No chest pain, palpitations, dizziness   Resp:  No SOB, cough, wheezing  GI:  See HPI  :  No burning with urination, hematuria  Muscle:  No pain, weakness  Neuro:  No weakness/tingling, memory problems  Psych:  No fatigue, insomnia, mood problems, depression  Heme:  No easy bruisability  Skin:  No rash, edema      PHYSICAL EXAM:     GENERAL:  Well developed, no distress  HEENT:  NC/AT,  conjunctivae clear, sclera anicteric  CHEST:  Full & symmetric excursion, no increased effort w/ respirations  HEART:  Regular rhythm & rate  ABDOMEN:  Soft, non-tender, non-distended  EXTREMITIES:  no LE  edema  SKIN:  No rash/erythema  NEURO:  Alert, oriented    Vital Signs:  Vital Signs Last 24 Hrs  T(C): 36.8 (13 Jan 2021 04:26), Max: 36.8 (13 Jan 2021 01:07)  T(F): 98.3 (13 Jan 2021 04:26), Max: 98.3 (13 Jan 2021 04:26)  HR: 78 (13 Jan 2021 04:26) (75 - 87)  BP: 109/68 (13 Jan 2021 04:26) (89/61 - 127/92)  BP(mean): --  RR: 18 (13 Jan 2021 04:26) (13 - 19)  SpO2: 95% (13 Jan 2021 04:26) (95% - 99%)  Daily Height in cm: 160.02 (12 Jan 2021 11:08)    Daily     LABS:                        9.0    11.69 )-----------( 184      ( 13 Jan 2021 07:24 )             29.7     01-12    134<L>  |  94<L>  |  23  ----------------------------<  105<H>  4.3   |  26  |  5.81<H>    Ca    8.9      12 Jan 2021 07:25  Phos  4.0     01-12  Mg     2.7     01-12    TPro  7.1  /  Alb  3.9  /  TBili  2.6<H>  /  DBili  x   /  AST  30  /  ALT  19  /  AlkPhos  122<H>  01-12    LIVER FUNCTIONS - ( 12 Jan 2021 07:25 )  Alb: 3.9 g/dL / Pro: 7.1 g/dL / ALK PHOS: 122 U/L / ALT: 19 U/L / AST: 30 U/L / GGT: x           PT/INR - ( 13 Jan 2021 07:25 )   PT: 13.4 sec;   INR: 1.12 ratio         PTT - ( 13 Jan 2021 07:25 )  PTT:33.6 sec        Imaging:    < from: Upper Endoscopy (01.12.21 @ 11:10) >  Findings:       The examined esophagus was normal. No esophageal varices were seen.       Type 1 and 2 isolated gastric varices (IGV1 and IGV2, varices located in the fundus and        extending into gastric body) with no bleeding were found in the gastric fundus and proximal        body. There were no stigmata of recent bleeding. They were small to mediumin largest        diameter.       Mild portal hypertensive gastropathy was found in the gastric fundus, in the gastric body and        in the gastric antrum.       The examined duodenum was normal.                                                    Impression:          - Normal esophagus.                       - Type 1 and 2 isolated gastric varices (IGV1 and IGV2, varices located in                        the fundus and proximal gastric body), without bleeding.                       - Portal hypertensive gastropathy.                       - Normal examined duodenum.                       - No specimens collected.    < end of copied text >

## 2021-01-13 NOTE — DIETITIAN INITIAL EVALUATION ADULT. - ORAL INTAKE PTA/DIET HISTORY
Pt reports fair to poor PO intake PTA. Only consumes 2 meals/day and often does not feel hungry. Consumes traditional Portuguese foods. Follows renal diet restrictions including limiting sodium, potassium, and phosphorus. Does not add salt to foods. Avoiding dairy products due to phosphorus content and tries to consume high protein as she is aware of increased needs. Has tried high protein renal supplement however does not tolerate. NKFA. Denies hx of chewing/swallowing difficulty.

## 2021-01-13 NOTE — DIETITIAN INITIAL EVALUATION ADULT. - CHIEF COMPLAINT
The patient is a 60y Female PMHx of HTN, pancreatic cysts, cirrhosis, ESRD on HD (MWF), JAK2+ PCV with splenomegaly. Presenting with abdominal pain, and per hepatology, concern for non-cirrhotic portal hypertension

## 2021-01-13 NOTE — DIETITIAN INITIAL EVALUATION ADULT. - PROBLEM/PLAN-7
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 Therefore, Guy continues to live with his mother, as he has not developed all of the necessary skills to live independently.     Please accept this letter as verification that Guy Thornton is a household dependent of your employee, Shwetha Thornton.    Sincerely,       Baldomero Hernandez MD   Medical Director, Pediatric Neuro-oncology and Neurofibromatosis programs   Co-medical director, [facility] Pediatric Hematology Oncology                                          
DISPLAY PLAN FREE TEXT

## 2021-01-13 NOTE — DIETITIAN INITIAL EVALUATION ADULT. - OTHER CALCULATIONS
Weight used for calculations: current dosing wt 120.7lbs. Defer fluid needs to team in setting of ESRD on HD.

## 2021-01-13 NOTE — PROGRESS NOTE ADULT - SUBJECTIVE AND OBJECTIVE BOX
[facility] DIVISION OF KIDNEY DISEASES AND HYPERTENSION -- FOLLOW UP NOTE  --------------------------------------------------------------------------------  Chief Complaint:/subjective: feeling better today    24 hour events: had transjugular liver biopsy 1/11 and had EGD 1/12        PAST HISTORY  --------------------------------------------------------------------------------  No significant changes to PMH, PSH, FHx, SHx, unless otherwise noted    ALLERGIES & MEDICATIONS  --------------------------------------------------------------------------------  Allergies    No Known Allergies    Intolerances      Standing Inpatient Medications  calcium acetate 1334 milliGRAM(s) Oral three times a day with meals  epoetin filiberto-epbx (RETACRIT) Injectable 51308 Unit(s) IV Push <User Schedule>  heparin   Injectable 5000 Unit(s) SubCutaneous every 8 hours  pantoprazole    Tablet 40 milliGRAM(s) Oral before breakfast  polyethylene glycol 3350 17 Gram(s) Oral daily  senna 2 Tablet(s) Oral at bedtime    PRN Inpatient Medications  acetaminophen   Tablet .. 650 milliGRAM(s) Oral every 6 hours PRN  HYDROmorphone   Tablet 4 milliGRAM(s) Oral every 6 hours PRN      REVIEW OF SYSTEMS  --------------------------------------------------------------------------------  Gen: No weight changes, fatigue, fevers/chills, weakness  Skin: No rashes  Head/Eyes/Ears/Mouth: No headache;   Respiratory: No dyspnea, cough  CV: No chest pain, PND, orthopnea  GI: No abdominal pain, diarrhea, constipation, nausea, vomiting  : No increased frequency, dysuria, hematuria, nocturia  MSK: No joint pain/swelling; no back pain; no edema  Neuro: No dizziness/lightheadedness, weakness  Heme: No easy bruising or bleeding  Psych: No significant nervousness, anxiety, stress, depression    All other systems were reviewed and are negative, except as noted.    VITALS/PHYSICAL EXAM  --------------------------------------------------------------------------------  T(C): 36.8 (01-13-21 @ 04:26), Max: 36.8 (01-13-21 @ 01:07)  HR: 78 (01-13-21 @ 04:26) (75 - 87)  BP: 109/68 (01-13-21 @ 04:26) (89/61 - 127/92)  RR: 18 (01-13-21 @ 04:26) (16 - 19)  SpO2: 95% (01-13-21 @ 04:26) (95% - 99%)  Wt(kg): --  Adult Advanced Hemodynamics Last 24 Hrs  ABP: --  ABP(mean): --  CVP(mm Hg): --  CO: --  CI: --  PA: --  PA(mean): --  PCWP: --  SVR: --  SVRI: --  Height (cm): 160 (01-12-21 @ 11:08)  Weight (kg): 57.5 (01-12-21 @ 11:08)  BMI (kg/m2): 22.5 (01-12-21 @ 11:08)  BSA (m2): 1.59 (01-12-21 @ 11:08)      Physical Exam:  	Gen: NAD, well-appearing  	HEENT:  no jvp  	Pulm: CTA B/L  	CV: RRR, S1S2; no rub  	Back:  ; no sacral edema  	Abd: +BS, soft,    	: No suprapubic tenderness  	Ext: no edema  	Neuro: No focal deficits,    	Psych: Normal affect    	Skin: Warm,    	Vascular access: avf    LABS/STUDIES  --------------------------------------------------------------------------------              9.0    11.69 >-----------<  184      [01-13-21 @ 07:24]              29.7     Hemoglobin: 9.0 g/dL (01-13-21 @ 07:24)  Hemoglobin: 9.7 g/dL (01-12-21 @ 08:51)    Platelet Count - Automated: 184 K/uL (01-13-21 @ 07:24)  Platelet Count - Automated: 177 K/uL (01-12-21 @ 08:51)    134  |  94  |  38  ----------------------------<  135      [01-13-21 @ 07:21]  4.0   |  24  |  7.70        Ca     8.8     [01-13-21 @ 07:21]      Mg     3.0     [01-13-21 @ 07:21]      Phos  5.1     [01-13-21 @ 07:21]    TPro  7.1  /  Alb  3.9  /  TBili  3.5  /  DBili  x   /  AST  19  /  ALT  21  /  AlkPhos  120  [01-13-21 @ 07:21]    PT/INR: PT 13.4 , INR 1.12       [01-13-21 @ 07:25]  PTT: 33.6       [01-13-21 @ 07:25]      Creatinine Trend:  SCr 7.70 [01-13 @ 07:21]  SCr 5.81 [01-12 @ 07:25]  SCr 8.90 [01-11 @ 06:59]  SCr 7.70 [01-10 @ 07:21]  SCr 5.51 [01-09 @ 07:51]    Urinalysis - [01-09-21 @ 12:32]      Color Yellow / Appearance Slightly Turbid / SG 1.021 / pH 8.0      Gluc Trace / Ketone Trace  / Bili Negative / Urobili Negative       Blood Trace / Protein 300 mg/dL / Leuk Est Large / Nitrite Negative      RBC 1 /  / Hyaline 8 / Gran  / Sq Epi  / Non Sq Epi 18 / Bacteria Many    Urine Sodium 68      [01-09-21 @ 12:33]  Urine Osmolality 307      [01-09-21 @ 12:33]    Iron 56, TIBC 150, %sat 37      [01-09-21 @ 12:20]  Ferritin 640      [01-09-21 @ 11:24]  PTH -- (Ca 8.6)      [01-26-20 @ 09:35]   175  HbA1c 4.9      [06-01-19 @ 00:46]

## 2021-01-13 NOTE — PROGRESS NOTE ADULT - PROBLEM SELECTOR PROBLEM 8
Polycythemia vera

## 2021-01-13 NOTE — DISCHARGE NOTE NURSING/CASE MANAGEMENT/SOCIAL WORK - PATIENT PORTAL LINK FT
You can access the FollowMyHealth Patient Portal offered by [facility] by registering at the following website: [email or web address]. By joining [email or web address]’s FollowMyHealth portal, you will also be able to view your health information using other applications (apps) compatible with our system.

## 2021-01-13 NOTE — DIETITIAN INITIAL EVALUATION ADULT. - REASON INDICATOR FOR ASSESSMENT
Length Of Stay   Source: Pt, RN, EMR  Pt is Malay speaking and  used to conduct interview.  ID [id number]

## 2021-01-13 NOTE — DIETITIAN INITIAL EVALUATION ADULT. - ORAL NUTRITION SUPPLEMENTS
2.) Pt declines oral nutrition supplements at this time. 2.) Pt declines oral nutrition supplements at this time. Will monitor for acceptance.

## 2021-01-13 NOTE — PROGRESS NOTE ADULT - SUBJECTIVE AND OBJECTIVE BOX
INTERVAL HPI/OVERNIGHT EVENTS:  Patient S&E at bedside. No o/n events. Tolerated liver biopsy yesterday without complication.     VITAL SIGNS:  T(F): 98.3 (01-13-21 @ 04:26)  HR: 78 (01-13-21 @ 04:26)  BP: 109/68 (01-13-21 @ 04:26)  RR: 18 (01-13-21 @ 04:26)  SpO2: 95% (01-13-21 @ 04:26)  Wt(kg): --    PHYSICAL EXAM:    Constitutional: NAD  Eyes: EOMI, sclera non-icteric  Neck: supple  Respiratory: CTAB, no wheezes or crackles   Cardiovascular: RRR  Gastrointestinal: soft, NT +Distension, + BS  Extremities: no cyanosis, clubbing or edema   Neurological: awake and alert      MEDICATIONS  (STANDING):  calcium acetate 1334 milliGRAM(s) Oral three times a day with meals  epoetin filiberto-epbx (RETACRIT) Injectable 87744 Unit(s) IV Push <User Schedule>  heparin   Injectable 5000 Unit(s) SubCutaneous every 8 hours  pantoprazole    Tablet 40 milliGRAM(s) Oral before breakfast  polyethylene glycol 3350 17 Gram(s) Oral daily  senna 2 Tablet(s) Oral at bedtime    MEDICATIONS  (PRN):  acetaminophen   Tablet .. 650 milliGRAM(s) Oral every 6 hours PRN Mild Pain (1 - 3), Moderate Pain (4 - 6)  HYDROmorphone   Tablet 4 milliGRAM(s) Oral every 6 hours PRN Severe Pain (7 - 10)      Allergies    No Known Allergies    Intolerances        LABS:                        9.0    11.69 )-----------( 184      ( 13 Jan 2021 07:24 )             29.7     01-13    134<L>  |  94<L>  |  38<H>  ----------------------------<  135<H>  4.0   |  24  |  7.70<H>    Ca    8.8      13 Jan 2021 07:21  Phos  5.1     01-13  Mg     3.0     01-13    TPro  7.1  /  Alb  3.9  /  TBili  3.5<H>  /  DBili  x   /  AST  19  /  ALT  21  /  AlkPhos  120  01-13    PT/INR - ( 13 Jan 2021 07:25 )   PT: 13.4 sec;   INR: 1.12 ratio         PTT - ( 13 Jan 2021 07:25 )  PTT:33.6 sec      RADIOLOGY & ADDITIONAL TESTS:  Studies reviewed.   INTERVAL HPI/OVERNIGHT EVENTS:  Patient S&E at bedside. No o/n events. Tolerated endoscopy yesterday without complication.     VITAL SIGNS:  T(F): 98.3 (01-13-21 @ 04:26)  HR: 78 (01-13-21 @ 04:26)  BP: 109/68 (01-13-21 @ 04:26)  RR: 18 (01-13-21 @ 04:26)  SpO2: 95% (01-13-21 @ 04:26)  Wt(kg): --    PHYSICAL EXAM:    Constitutional: NAD  Eyes: EOMI, sclera non-icteric  Neck: supple  Respiratory: CTAB, no wheezes or crackles   Cardiovascular: RRR  Gastrointestinal: soft, NT +Distension, + BS  Extremities: no cyanosis, clubbing or edema   Neurological: awake and alert      MEDICATIONS  (STANDING):  calcium acetate 1334 milliGRAM(s) Oral three times a day with meals  epoetin filiberto-epbx (RETACRIT) Injectable 38169 Unit(s) IV Push <User Schedule>  heparin   Injectable 5000 Unit(s) SubCutaneous every 8 hours  pantoprazole    Tablet 40 milliGRAM(s) Oral before breakfast  polyethylene glycol 3350 17 Gram(s) Oral daily  senna 2 Tablet(s) Oral at bedtime    MEDICATIONS  (PRN):  acetaminophen   Tablet .. 650 milliGRAM(s) Oral every 6 hours PRN Mild Pain (1 - 3), Moderate Pain (4 - 6)  HYDROmorphone   Tablet 4 milliGRAM(s) Oral every 6 hours PRN Severe Pain (7 - 10)      Allergies    No Known Allergies    Intolerances        LABS:                        9.0    11.69 )-----------( 184      ( 13 Jan 2021 07:24 )             29.7     01-13    134<L>  |  94<L>  |  38<H>  ----------------------------<  135<H>  4.0   |  24  |  7.70<H>    Ca    8.8      13 Jan 2021 07:21  Phos  5.1     01-13  Mg     3.0     01-13    TPro  7.1  /  Alb  3.9  /  TBili  3.5<H>  /  DBili  x   /  AST  19  /  ALT  21  /  AlkPhos  120  01-13    PT/INR - ( 13 Jan 2021 07:25 )   PT: 13.4 sec;   INR: 1.12 ratio         PTT - ( 13 Jan 2021 07:25 )  PTT:33.6 sec      RADIOLOGY & ADDITIONAL TESTS:  Studies reviewed.

## 2021-01-13 NOTE — PROGRESS NOTE ADULT - PROVIDER SPECIALTY LIST ADULT
Hepatology
Heme/Onc
Intervent Radiology
Heme/Onc
Hepatology
Internal Medicine
Hepatology
Nephrology
Internal Medicine

## 2021-01-13 NOTE — PROGRESS NOTE ADULT - ATTENDING COMMENTS
60yoF with Hx of HTN, ESRD on HD (MWF), JAK2+ PCV with splenomegaly presenting with abdominal pain likely due to splenomegaly, also with hepatomegaly and evidence of portal HTN, hepatology workup ongoing. Follow up liver biopsy. EGD with non-bleeding gastric varices. Recommend BMBX given anemia which would not be expected in the setting of P vera and increasing splenomegaly to rule out progression to MF.

## 2021-01-13 NOTE — PROGRESS NOTE ADULT - SUBJECTIVE AND OBJECTIVE BOX
PROGRESS NOTE:     SUBJECTIVE / OVERNIGHT EVENTS:  No acute events overnight. Patient seen and evaluated at bedside. No fever/chills.  Denies SOB at rest, chest pain, palpitations, abdominal pain, nausea/vomiting      MEDICATIONS  (STANDING):  calcium acetate 1334 milliGRAM(s) Oral three times a day with meals  epoetin filiberto-epbx (RETACRIT) Injectable 11789 Unit(s) IV Push <User Schedule>  heparin   Injectable 5000 Unit(s) SubCutaneous every 8 hours  pantoprazole    Tablet 40 milliGRAM(s) Oral before breakfast  polyethylene glycol 3350 17 Gram(s) Oral daily  senna 2 Tablet(s) Oral at bedtime    MEDICATIONS  (PRN):  acetaminophen   Tablet .. 650 milliGRAM(s) Oral every 6 hours PRN Mild Pain (1 - 3), Moderate Pain (4 - 6)  HYDROmorphone   Tablet 4 milliGRAM(s) Oral every 6 hours PRN Severe Pain (7 - 10)      CAPILLARY BLOOD GLUCOSE        I&O's Summary      PHYSICAL EXAM:  Vital Signs Last 24 Hrs  T(C): 36.8 (13 Jan 2021 04:26), Max: 36.8 (13 Jan 2021 01:07)  T(F): 98.3 (13 Jan 2021 04:26), Max: 98.3 (13 Jan 2021 04:26)  HR: 78 (13 Jan 2021 04:26) (75 - 87)  BP: 109/68 (13 Jan 2021 04:26) (89/61 - 127/92)  BP(mean): --  RR: 18 (13 Jan 2021 04:26) (13 - 19)  SpO2: 95% (13 Jan 2021 04:26) (95% - 99%)    GENERAL: NAD  HEAD:  Atraumatic, Normocephalic  HEENT: scleral anicteric.   CV: Regular rate and rhythm. No murmurs, rubs, or gallops  Respiratory: normal respiratory effort, speaking in complete sentences. Lungs clear to auscultation bilaterally, no wheezes/crackles.  ABDOMEN: Soft, nondistended, normal active bowel sounds, RUQ, RLQ and LLQ nontender to palpation, LUQ mildly tender to palpation, no rebound, no guarding. Brown stool.   EXTREMITIES: No lower extremity edema. Bilateral LE symmetric in size. : LUE AVF +thrills/bruits  NEURO: Symmetric facial expressions.   Skin: No rashes. No abdominal wall, flank, back, or thigh ecchymoses   LABS:                        9.7    12.05 )-----------( 177      ( 12 Jan 2021 08:51 )             31.7     01-12    134<L>  |  94<L>  |  23  ----------------------------<  105<H>  4.3   |  26  |  5.81<H>    Ca    8.9      12 Jan 2021 07:25  Phos  4.0     01-12  Mg     2.7     01-12    TPro  7.1  /  Alb  3.9  /  TBili  2.6<H>  /  DBili  x   /  AST  30  /  ALT  19  /  AlkPhos  122<H>  01-12    PT/INR - ( 12 Jan 2021 08:51 )   PT: 13.6 sec;   INR: 1.14 ratio         PTT - ( 12 Jan 2021 08:51 )  PTT:34.7 sec

## 2021-01-13 NOTE — H&P ADULT - PROBLEM SELECTOR PROBLEM 1
Patient has know sciatica pain and today she states it just got worse today. Patient is in physical therapy for the pain and is on Norco but states she has not taking it in a couple of day. Patient denies any other complaints at this time.
Epigastric pain

## 2021-01-14 ENCOUNTER — NON-APPOINTMENT (OUTPATIENT)
Age: 61
End: 2021-01-14

## 2021-01-14 LAB — EPO SERPL-MCNC: 6.8 MIU/ML — SIGNIFICANT CHANGE UP (ref 2.6–18.5)

## 2021-01-26 ENCOUNTER — OUTPATIENT (OUTPATIENT)
Dept: OUTPATIENT SERVICES | Facility: HOSPITAL | Age: 61
LOS: 1 days | Discharge: HOME | End: 2021-01-26

## 2021-01-26 ENCOUNTER — OUTPATIENT (OUTPATIENT)
Dept: OUTPATIENT SERVICES | Facility: HOSPITAL | Age: 61
LOS: 1 days | End: 2021-01-26
Payer: SELF-PAY

## 2021-01-26 ENCOUNTER — APPOINTMENT (OUTPATIENT)
Dept: GASTROENTEROLOGY | Facility: HOSPITAL | Age: 61
End: 2021-01-26

## 2021-01-26 DIAGNOSIS — T85.898A OTHER SPECIFIED COMPLICATION OF OTHER INTERNAL PROSTHETIC DEVICES, IMPLANTS AND GRAFTS, INITIAL ENCOUNTER: Chronic | ICD-10-CM

## 2021-01-26 DIAGNOSIS — Z78.9 OTHER SPECIFIED HEALTH STATUS: ICD-10-CM

## 2021-01-26 DIAGNOSIS — Z99.2 DEPENDENCE ON RENAL DIALYSIS: Chronic | ICD-10-CM

## 2021-01-26 DIAGNOSIS — K76.9 LIVER DISEASE, UNSPECIFIED: ICD-10-CM

## 2021-01-26 DIAGNOSIS — I77.0 ARTERIOVENOUS FISTULA, ACQUIRED: Chronic | ICD-10-CM

## 2021-01-26 DIAGNOSIS — D75.1 SECONDARY POLYCYTHEMIA: ICD-10-CM

## 2021-01-26 PROCEDURE — G0463: CPT

## 2021-01-26 NOTE — REASON FOR VISIT
[Consultation] : a consultation visit [Home] : at home, [unfilled] , at the time of the visit. [Medical Office: ([facility] [place])___] : at the medical office located in  [Verbal consent obtained from patient] : the patient, [unfilled] [Initial Evaluation] : an initial evaluation [FreeTextEntry1] : non-cirrhotic portal hypertension with gastric varices

## 2021-01-26 NOTE — HISTORY OF PRESENT ILLNESS
[Home] : at home, [unfilled] , at the time of the visit. [Medical Office: ([facility])___] : at the medical office located in  [Verbal consent obtained from patient] : the patient, [unfilled] [FreeTextEntry1] : 60F w/ Hx PV (JAK2+), ESRD on HD, HTN, pancreatic cysts who presented to [facility] on 1/5/21 for LUQ abd pain. \par \par Pt endorse she continues to have LUQ abd pain, which worsens when she says on the left side -- otherwise abd pain overall improved. She feels early satiety, concerned \par \par [facility] Hospitalization (1/5/21-1/13/21): \par Pt presented on 1/5/21 to [facility] c/o LUQ abd pain, radiating to left shoulder and ear. Found to have CT a/p (1/5/21) showed marked splenomegaly + gastric varices which had increased from prior imaging. EGD showed normal esophagus. type 1 and 2 isolated gastric varices (IGV1 and IGV2, varices located in the fundus and proximal gastric body) without bleeding + portal hypertensive gastropathy. Initiated on BB tx w/ nadolol, the gastric varices did not  appear amenable to endoscopic therapy with cyanoacrylate injection and given no prior history of hemorrhage, TIPS and/or gastric variceal embolization by IR is not currently indicated. 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 \par \par \par \par

## 2021-01-26 NOTE — REVIEW OF SYSTEMS
[As Noted in HPI] : as noted in HPI [Negative] : Heme/Lymph [Eye Pain] : no eye pain [Red Eyes] : eyes not red [Heart Rate Is Slow] : the heart rate was not slow [Heart Rate Is Fast] : the heart rate was not fast [Chest Pain] : no chest pain [Palpitations] : no palpitations [Lower Ext Edema] : no lower extremity edema [Shortness Of Breath] : no shortness of breath [Wheezing] : no wheezing [Cough] : no cough [Dysuria] : no dysuria [Joint Pain] : no joint pain [Limb Swelling] : no limb swelling [Itching] : no itching [Confused] : no confusion [Convulsions] : no convulsions [Dizziness] : no dizziness [Fainting] : no fainting [Muscle Weakness] : no muscle weakness

## 2021-01-26 NOTE — REVIEW OF SYSTEMS
[As Noted in HPI] : as noted in HPI [Negative] : Heme/Lymph [Eye Pain] : no eye pain [Red Eyes] : eyes not red [Heart Rate Is Slow] : the heart rate was not slow [Heart Rate Is Fast] : the heart rate was not fast [Chest Pain] : no chest pain [Palpitations] : no palpitations [Lower Ext Edema] : no lower extremity edema [Wheezing] : no wheezing [Shortness Of Breath] : no shortness of breath [Cough] : no cough [Dysuria] : no dysuria [Joint Pain] : no joint pain [Limb Swelling] : no limb swelling [Itching] : no itching [Confused] : no confusion [Convulsions] : no convulsions [Dizziness] : no dizziness [Fainting] : no fainting [Muscle Weakness] : no muscle weakness

## 2021-01-26 NOTE — REASON FOR VISIT
[Consultation] : a consultation visit [Home] : at home, [unfilled] , at the time of the visit. [Medical Office: ([facility])___] : at the medical office located in  [Verbal consent obtained from patient] : the patient, [unfilled] [Initial Evaluation] : an initial evaluation [FreeTextEntry1] : non-cirrhotic portal hypertension with gastric varices

## 2021-01-26 NOTE — HISTORY OF PRESENT ILLNESS
[Home] : at home, [unfilled] , at the time of the visit. [Medical Office: ([facility])___] : at the medical office located in  [Verbal consent obtained from patient] : the patient, [unfilled] [FreeTextEntry1] : 60F w/ Hx PV (JAK2+), ESRD on HD, HTN, pancreatic cysts who presented to [facility] on 1/5/21 for LUQ abd pain. \par \par Pt endorse she continues to have LUQ abd pain, which worsens when she says on the left side -- otherwise abd pain overall improved. She feels early satiety, concerned \par \par [facility] Hospitalization (1/5/21-1/13/21): \par Pt presented on 1/5/21 to [facility] c/o LUQ abd pain, radiating to left shoulder and ear. Found to have CT a/p (1/5/21) showed marked splenomegaly + gastric varices which had increased from prior imaging. EGD showed normal esophagus. type 1 and 2 isolated gastric varices (IGV1 and IGV2, varices located in the fundus and proximal gastric body) without bleeding + portal hypertensive gastropathy. Initiated on BB tx w/ nadolol, the gastric varices did not  appear amenable to endoscopic therapy with cyanoacrylate injection and given no prior history of hemorrhage, TIPS and/or gastric variceal embolization by IR is not currently indicated. 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 \par \par \par \par

## 2021-01-26 NOTE — ASSESSMENT
[FreeTextEntry1] : 60F w/ Hx PV (JAK2+), ESRD on HD, HTN, pancreatic cysts who presented to [facility] on 1/5/21 for LUQ abd pain, found to have noncirrhotic portal HTN w/ gastric varices had a telephonic visit today.  \par \par IMPRESSION: \par #Noncirrhotic portal HTN c/b gastric varices: dx after recent hospitalization (1/5-1/13) for LUQ abd pain, found to have splenomegaly + gastric varices. Liver biopsy w/ elevated portal pressures (portosystemic gradient 9) showing only focal nodular hyperplasia (no fibrosis). EGD showed gastric varices--not amenable to endoscopic therapy with cyanoacrylate injection. \par #PV: JAK2+, currently reports she is not on hydroxyurea. \par #Hep B vaccination: pt w/ 4 doses of Hep B vaccination, however inappropriately timed. Does not have (+) Ab titers. Will need revaccination w/ Heplisav.\par \par \par PLAN: \par - Follow up with heme/onc re: tx of PCV as it likely is worsening her noncirrhotic portal HTN (treatment of PCV should improve noncirrhotic portal HTN/gastric varices) \par - Continue nadolol 20mg daily\par - Repeat EGD in 2-3 years (2022-23)\par - Start Heplisav first dose and 2nd dose due in 1 month.  \par \par \par Sreekanth Bustamante MD\par GI Fellow PGY4

## 2021-01-26 NOTE — REASON FOR VISIT
[Consultation] : a consultation visit [Home] : at home, [unfilled] , at the time of the visit. [Medical Office: ([facility])___] : at the medical office located in  [Verbal consent obtained from patient] : the patient, [unfilled] [Initial Evaluation] : an initial evaluation [FreeTextEntry1] : non-cirrhotic portal hypertension with gastric varices

## 2021-01-26 NOTE — ASSESSMENT
[FreeTextEntry1] : 60F w/ Hx PV (JAK2+), ESRD on HD, HTN, pancreatic cysts who presented to [facility] on 1/5/21 for LUQ abd pain, found to have noncirrhotic portal HTN w/ gastric varices had a telephonic visit today.  \par \par IMPRESSION: \par #Noncirrhotic portal HTN c/b gastric varices: dx after recent hospitalization (1/5-1/13) for LUQ abd pain, found to have splenomegaly + gastric varices. Liver biopsy w/ elevated portal pressures (portosystemic gradient 9) showing only focal nodular hyperplasia (no fibrosis). EGD showed gastric varices--not amenable to endoscopic therapy with cyanoacrylate injection. \par #PV: JAK2+, currently reports she is not on hydroxyurea. \par #Hep B vaccination: pt w/ 4 doses of Hep B vaccination, however inappropriately timed. Does not have (+) Ab titers. Will need revaccination w/ Heplisav.\par \par \par PLAN: \par - Follow up with heme/onc re: tx of PCV as it likely is worsening her noncirrhotic portal HTN (treatment of PCV should improve noncirrhotic portal HTN/gastric varices) \par - Continue nadolol 20mg daily\par - Repeat EGD in 2-3 years (2022-23)\par - Start Heplisav first dose and 2nd dose due in 1 month.  \par \par \par Sreekanth Bustamante MD\par GI Fellow PGY4

## 2021-01-26 NOTE — ASSESSMENT
[FreeTextEntry1] : 60F w/ Hx PV (JAK2+), ESRD on HD, HTN, pancreatic cysts who presented to [facility] on 1/5/21 for LUQ abd pain, found to have noncirrhotic portal HTN w/ gastric varices had a telephonic visit today.  \par \par IMPRESSION: \par #Noncirrhotic portal HTN c/b gastric varices: dx after recent hospitalization (1/5-1/13) for LUQ abd pain, found to have splenomegaly + gastric varices. Liver biopsy w/ elevated portal pressures (portosystemic gradient 9) showing only focal nodular hyperplasia (no fibrosis). EGD showed gastric varices--not amenable to endoscopic therapy with cyanoacrylate injection. \par #PV: JAK2+, currently reports she is not on hydroxyurea. \par #Hep B vaccination: pt w/ 4 doses of Hep B vaccination, however inappropriately timed. Does not have (+) Ab titers. Will need revaccination w/ Heplisav.\par \par \par PLAN: \par - Follow up with heme/onc re: tx of PCV as it likely is worsening her noncirrhotic portal HTN (treatment of PCV should improve noncirrhotic portal HTN/gastric varices) \par - Continue nadolol 20mg daily\par - Repeat EGD in 2-3 years (2022-23)\par - Start Heplisav first dose and 2nd dose due in 1 month.  \par \par \par Sreekanth Bustamante MD\par GI Fellow PGY4

## 2021-01-26 NOTE — HISTORY OF PRESENT ILLNESS
[Home] : at home, [unfilled] , at the time of the visit. [Medical Office: ([facility])___] : at the medical office located in  [Verbal consent obtained from patient] : the patient, [unfilled] [FreeTextEntry1] : 60F w/ Hx PV (JAK2+), ESRD on HD, HTN, pancreatic cysts who presented to [facility] on 1/5/21 for LUQ abd pain. \par \par Pt endorse she continues to have LUQ abd pain, which worsens when she says on the left side -- otherwise abd pain overall improved. She feels early satiety, concerned \par \par [facility] Hospitalization (1/5/21-1/13/21): \par Pt presented on 1/5/21 to [facility] c/o LUQ abd pain, radiating to left shoulder and ear. Found to have CT a/p (1/5/21) showed marked splenomegaly + gastric varices which had increased from prior imaging. EGD showed normal esophagus. type 1 and 2 isolated gastric varices (IGV1 and IGV2, varices located in the fundus and proximal gastric body) without bleeding + portal hypertensive gastropathy. Initiated on BB tx w/ nadolol, the gastric varices did not  appear amenable to endoscopic therapy with cyanoacrylate injection and given no prior history of hemorrhage, TIPS and/or gastric variceal embolization by IR is not currently indicated. 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 \par \par \par \par

## 2021-01-27 DIAGNOSIS — I86.4 GASTRIC VARICES: ICD-10-CM

## 2021-01-27 DIAGNOSIS — Z23 ENCOUNTER FOR IMMUNIZATION: ICD-10-CM

## 2021-01-27 DIAGNOSIS — K76.6 PORTAL HYPERTENSION: ICD-10-CM

## 2021-01-28 ENCOUNTER — RESULT REVIEW (OUTPATIENT)
Age: 61
End: 2021-01-28

## 2021-01-28 ENCOUNTER — APPOINTMENT (OUTPATIENT)
Dept: HEMATOLOGY ONCOLOGY | Facility: CLINIC | Age: 61
End: 2021-01-28
Payer: COMMERCIAL

## 2021-01-28 VITALS
WEIGHT: 121.25 LBS | SYSTOLIC BLOOD PRESSURE: 136 MMHG | HEART RATE: 65 BPM | DIASTOLIC BLOOD PRESSURE: 78 MMHG | RESPIRATION RATE: 18 BRPM | BODY MASS INDEX: 22.91 KG/M2 | OXYGEN SATURATION: 100 % | TEMPERATURE: 98 F

## 2021-01-28 LAB
BASOPHILS # BLD AUTO: 0.11 K/UL — SIGNIFICANT CHANGE UP (ref 0–0.2)
BASOPHILS NFR BLD AUTO: 0.9 % — SIGNIFICANT CHANGE UP (ref 0–2)
EOSINOPHIL # BLD AUTO: 0.27 K/UL — SIGNIFICANT CHANGE UP (ref 0–0.5)
EOSINOPHIL NFR BLD AUTO: 2.3 % — SIGNIFICANT CHANGE UP (ref 0–6)
HCT VFR BLD CALC: 29.7 % — LOW (ref 34.5–45)
HGB BLD-MCNC: 8.9 G/DL — LOW (ref 11.5–15.5)
IMM GRANULOCYTES NFR BLD AUTO: 3.1 % — HIGH (ref 0–1.5)
LYMPHOCYTES # BLD AUTO: 1.07 K/UL — SIGNIFICANT CHANGE UP (ref 1–3.3)
LYMPHOCYTES # BLD AUTO: 9.1 % — LOW (ref 13–44)
MCHC RBC-ENTMCNC: 29.9 PG — SIGNIFICANT CHANGE UP (ref 27–34)
MCHC RBC-ENTMCNC: 30 G/DL — LOW (ref 32–36)
MCV RBC AUTO: 99.7 FL — SIGNIFICANT CHANGE UP (ref 80–100)
MONOCYTES # BLD AUTO: 0.5 K/UL — SIGNIFICANT CHANGE UP (ref 0–0.9)
MONOCYTES NFR BLD AUTO: 4.3 % — SIGNIFICANT CHANGE UP (ref 2–14)
NEUTROPHILS # BLD AUTO: 9.4 K/UL — HIGH (ref 1.8–7.4)
NEUTROPHILS NFR BLD AUTO: 80.3 % — HIGH (ref 43–77)
NRBC # BLD: 0 /100 WBCS — SIGNIFICANT CHANGE UP (ref 0–0)
PLATELET # BLD AUTO: 208 K/UL — SIGNIFICANT CHANGE UP (ref 150–400)
RBC # BLD: 2.98 M/UL — LOW (ref 3.8–5.2)
RBC # FLD: 21.7 % — HIGH (ref 10.3–14.5)
WBC # BLD: 11.71 K/UL — HIGH (ref 3.8–10.5)
WBC # FLD AUTO: 11.71 K/UL — HIGH (ref 3.8–10.5)

## 2021-01-28 PROCEDURE — 99213 OFFICE O/P EST LOW 20 MIN: CPT

## 2021-01-29 LAB
ALBUMIN SERPL ELPH-MCNC: 4.3 G/DL
ALP BLD-CCNC: 142 U/L
ALT SERPL-CCNC: 14 U/L
ANION GAP SERPL CALC-SCNC: 13 MMOL/L
AST SERPL-CCNC: 22 U/L
BILIRUB SERPL-MCNC: 3.5 MG/DL
BUN SERPL-MCNC: 18 MG/DL
CALCIUM SERPL-MCNC: 8.8 MG/DL
CHLORIDE SERPL-SCNC: 98 MMOL/L
CO2 SERPL-SCNC: 26 MMOL/L
CREAT SERPL-MCNC: 4.45 MG/DL
FERRITIN SERPL-MCNC: 528 NG/ML
GLUCOSE SERPL-MCNC: 94 MG/DL
IRON SATN MFR SERPL: 31 %
IRON SERPL-MCNC: 52 UG/DL
POTASSIUM SERPL-SCNC: 4.7 MMOL/L
PROT SERPL-MCNC: 7.2 G/DL
SODIUM SERPL-SCNC: 137 MMOL/L
TIBC SERPL-MCNC: 167 UG/DL
UIBC SERPL-MCNC: 115 UG/DL

## 2021-02-01 NOTE — ASSESSMENT
[FreeTextEntry1] : This is a 58 woman with a history of Polycythemia Vera, MAK 2 + since 2012. 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  She is now under my super vision for the P. Vera.  Patient had a normal hemoglobin on last follow up in 2019. Hg 12.5g/dl, was unable to explain why it was suddenly normal at the time.  More recently admitted for abdominal pain  secondary to splenomegaly with splenic infarcts.  Also anemic into the 9's range now.  Explained there are 2 possibilities for this.  She may have a superimposed anemia of chronic renal insufficiency, or patient is progressing to myelofibrosis.  Will need a bone marrow biopsy to rule that out.  Splenic infarcts are from massive splenomegaly, can restart hydroxyurea at a low dose of 3 times  week after dialysis THis may help limit the extramedullary hematopiesis and decrease size of spleen and symptoms ahead of impending bone marrow biopsy.  \par

## 2021-02-01 NOTE — HISTORY OF PRESENT ILLNESS
[de-identified] : This is a 59 year old woman with a history of MAK 2 + plycythemia vera.   diagnosed in 2012. 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 Hollie Guzmán in [place] (# [phone]). She has had a bone marrow biopsy done at the time of diagnosis.  Was previously non complaint with therapy there, however has been quite complaint with the hydroxyurea since coming to [facility] fellows clinic here in 2017.  Patient now follows with Dr. Jacky Guo after closure of the fellows clinic.  \par \par Abdominal US done May 2017 showed: attenuated main splenic vein indicative of previous/chronic thrombosis with surrounding patent varicosities; Portal venous system is patent with hepatopedal blood flow; Patent hepatic veins.\par \par She was admitted to [facility] from July 3-5, 2018 due to hyperkalemia and thrombosis of her AVF. Hematology was consulted and recommended phlebotomy, but patient refused. She was discharged on Hydrea 500mg on HD days.  which she is still on. As of 9/27/19 she has transferred from the hematology fellows clinic to my service at [facility].  \par \par Does not feel much side effects from the hydrea, but gets nausea with the dialysis.  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 patient had been admitted to the hospital for abdominal pain, found to have an anemia and  splenic infarct. Hx of MAK 2 positive Polycythemia vera.  Hg previously high controlled with hydroxyurea.  Patient had been lost to follow up was not taking Hydrea for a long time.  \par Food intake and appetite half of what it used to be.  \par Was on hydrea years ago, but was non complaint with this so they had changed it to periodic phlebotomy.  We did not continue this on last visit, given the Hg had decreased to a normal range.

## 2021-02-05 ENCOUNTER — OUTPATIENT (OUTPATIENT)
Dept: OUTPATIENT SERVICES | Facility: HOSPITAL | Age: 61
LOS: 1 days | End: 2021-02-05
Payer: SELF-PAY

## 2021-02-05 ENCOUNTER — NON-APPOINTMENT (OUTPATIENT)
Age: 61
End: 2021-02-05

## 2021-02-05 ENCOUNTER — APPOINTMENT (OUTPATIENT)
Dept: INTERNAL MEDICINE | Facility: CLINIC | Age: 61
End: 2021-02-05

## 2021-02-05 VITALS
WEIGHT: 118 LBS | HEART RATE: 71 BPM | OXYGEN SATURATION: 99 % | SYSTOLIC BLOOD PRESSURE: 120 MMHG | HEIGHT: 61 IN | BODY MASS INDEX: 22.28 KG/M2 | DIASTOLIC BLOOD PRESSURE: 70 MMHG

## 2021-02-05 DIAGNOSIS — Z99.2 DEPENDENCE ON RENAL DIALYSIS: Chronic | ICD-10-CM

## 2021-02-05 DIAGNOSIS — I10 ESSENTIAL (PRIMARY) HYPERTENSION: ICD-10-CM

## 2021-02-05 DIAGNOSIS — I77.0 ARTERIOVENOUS FISTULA, ACQUIRED: Chronic | ICD-10-CM

## 2021-02-05 DIAGNOSIS — R07.89 OTHER CHEST PAIN: ICD-10-CM

## 2021-02-05 DIAGNOSIS — T85.898A OTHER SPECIFIED COMPLICATION OF OTHER INTERNAL PROSTHETIC DEVICES, IMPLANTS AND GRAFTS, INITIAL ENCOUNTER: Chronic | ICD-10-CM

## 2021-02-05 PROCEDURE — G0463: CPT

## 2021-02-05 NOTE — PLAN
[FreeTextEntry1] : #CP\par -likely related to prior procedures vs. MSK \par -EKG with somewhat peaked T waves, otherwise NSR. \par \par #Polycythemia Vera\par -Pt followed by hematology\par -Currently on hydroxyurea 500 mg three times a week\par -Pt admitted to the hospital recently for abdominal pain 2/2 splenomegaly with splenic infarcts. Also anemic. Either 2/2 anemia of chronic renal insufficiency or progressing to myelofibrosis\par -Per heme, pt will need a bone biopsy \par \par #noncirrhotic portal HTN\par -followed by hepatology\par -likely worsened by PCV\par -continue nadolol 20 mg daily\par -Pt notes that she received Hep B vaccine first dose on  1/26/2021 (though not documented in chart) with hepatology. Second dose would be 1 month after. \par \par #ESRD\par -currently receives hemodialysis Tu/Th/Sat\par \par #HCM\par -pt notes receiving flu shot in Oct 2020\par -Requests COVID test, COVID19 PCR ordered \par \par Encouraged pt to RTC in 2 months or earlier if worsening of sx.\par \par Case d/w Dr. Rainey

## 2021-02-05 NOTE — REVIEW OF SYSTEMS
[Chest Pain] : chest pain [Abdominal Pain] : abdominal pain [Fever] : no fever [Chills] : no chills [Shortness Of Breath] : no shortness of breath [Nausea] : no nausea [Vomiting] : no vomiting [Dysuria] : no dysuria [Headache] : no headache [Dizziness] : no dizziness

## 2021-02-05 NOTE — PHYSICAL EXAM
[No Acute Distress] : no acute distress [No Respiratory Distress] : no respiratory distress  [Clear to Auscultation] : lungs were clear to auscultation bilaterally [Normal Rate] : normal rate  [Regular Rhythm] : with a regular rhythm [No Edema] : there was no peripheral edema [Soft] : abdomen soft [Non Tender] : non-tender [Non-distended] : non-distended [No Rash] : no rash [No Focal Deficits] : no focal deficits [Normal Gait] : normal gait [Normal Affect] : the affect was normal [Normal Insight/Judgement] : insight and judgment were intact

## 2021-02-05 NOTE — HISTORY OF PRESENT ILLNESS
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 \par \par Patient notes that her primary complaint right now is intermittent chest tightness, ongoing for brief seconds at a time for the last 2 months. She thinks that the pain may have started after getting her fistula for dialysis. Does not endorse SOB or worsening of pain with exertion. \par \par Pt was recently discharged from the hospital (1/6-1/13), found to have abdominal and splenic varices, noncirrhotic portal HTN. Liver bx showed focal nodular hyperplasia.

## 2021-02-05 NOTE — ASSESSMENT
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 \par

## 2021-02-09 LAB — SARS-COV-2 N GENE NPH QL NAA+PROBE: NOT DETECTED

## 2021-02-11 DIAGNOSIS — R07.89 OTHER CHEST PAIN: ICD-10-CM

## 2021-02-11 DIAGNOSIS — D45 POLYCYTHEMIA VERA: ICD-10-CM

## 2021-02-16 ENCOUNTER — RESULT REVIEW (OUTPATIENT)
Age: 61
End: 2021-02-16

## 2021-02-16 ENCOUNTER — APPOINTMENT (OUTPATIENT)
Dept: ENDOVASCULAR SURGERY | Facility: CLINIC | Age: 61
End: 2021-02-16
Payer: MEDICAID

## 2021-02-16 VITALS
RESPIRATION RATE: 16 BRPM | SYSTOLIC BLOOD PRESSURE: 131 MMHG | HEART RATE: 77 BPM | DIASTOLIC BLOOD PRESSURE: 89 MMHG | TEMPERATURE: 98.1 F | WEIGHT: 118 LBS | BODY MASS INDEX: 22.28 KG/M2 | OXYGEN SATURATION: 96 % | HEIGHT: 61 IN

## 2021-02-16 PROCEDURE — 36907Z: CUSTOM | Mod: 59

## 2021-02-16 PROCEDURE — 36902Z: CUSTOM

## 2021-02-23 LAB — JAK2 GENE MUT ANL BLD/T: NORMAL

## 2021-02-25 ENCOUNTER — APPOINTMENT (OUTPATIENT)
Dept: HEMATOLOGY ONCOLOGY | Facility: CLINIC | Age: 61
End: 2021-02-25
Payer: COMMERCIAL

## 2021-02-25 PROCEDURE — 99213 OFFICE O/P EST LOW 20 MIN: CPT | Mod: 95

## 2021-02-27 NOTE — ASSESSMENT
[FreeTextEntry1] : This is a 58 woman with a history of Polycythemia Vera, MAK 2 + since 2012. 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  She is now under my super vision for the P. Vera.  Patient had a normal hemoglobin on last follow up in 2019. Hg 12.5g/dl, was unable to explain why it was suddenly normal at the time.  More recently admitted for abdominal pain  secondary to splenomegaly with splenic infarcts.  Also anemic into the 9's range now.  We had attempted to schedule patient for bone marrow biopsy however there were some difficulties with the financial revolving around the self pay sliding scale aspect of the agreement. She does have emergency medicaid available.  Will have patient scheduled for this upcoming week.  will have her continue the hydrea 500mg 3 times a week after dialysis.

## 2021-02-27 NOTE — HISTORY OF PRESENT ILLNESS
[de-identified] : [id number] Syriac  \par \par Since last visit, patient had been found to be able to get the bone marrow biopsy as covered by emergency medicaid.  however so far ha not yet been schedueled .Will make another attempt to have this scheduled moving forward, on the day she comes in will have bloodwork done to follow up on response for hydrea.  Ferritin 528  as of 1/28/21.  \par Hg 8.9.  \par Prior to taking the Hydrea, patient twas feelign sick and had abdominal pain presumable from splenic infarcts in splenomegaly.   \par On last visit, MAK 2 positive polycythemia vera, 92% MAK 2 on FISh.  Paitnet proceeding to bone marrow biopsy for evaluation of question of Myelofibrosis given drop in Hemoglobin witout iron defficeincy or GI bleeding.  \par Patient has not been by the office fo a while just after new years day. patient had been admitted to the hospital for abdominal pain, found to have an anemia and  splenic infarct. Hx of MAK 2 positive Polycythemia vera.  Hg previously high controlled with hydroxyurea.  Patient had been lost to follow up was not taking Hydrea for a long time.  \par Food intake and appetite half of what it used to be.  \par Was on hydrea years ago, but was non complaint with this so they had changed it to periodic phlebotomy.  We did not continue this on last visit, given the Hg had decreased to a normal range.  \par

## 2021-03-01 NOTE — REASON FOR VISIT
[Other ___] : a [unfilled] visit for [Inadequate Flow within AVF] : inadequate flow within AVF [Spouse] : spouse [Family Member] : family member [FreeTextEntry2] : 2 month follow up fistulogram/ic/o increasing aneurysm

## 2021-03-01 NOTE — PROCEDURE
[Resume diet] : resume diet [Site check for bleeding/hematoma/thrill/bruit] : Site check for bleeding/hematoma/thrill/bruit [Vital signs on admission the q 15 mins x2] : Vital signs on admission the q 15 mins x2 [FreeTextEntry1] :  left arm fistula/fistulogram/angioplasty

## 2021-03-18 ENCOUNTER — OUTPATIENT (OUTPATIENT)
Dept: OUTPATIENT SERVICES | Facility: HOSPITAL | Age: 61
LOS: 1 days | Discharge: HOME | End: 2021-03-18

## 2021-03-18 DIAGNOSIS — T85.898A OTHER SPECIFIED COMPLICATION OF OTHER INTERNAL PROSTHETIC DEVICES, IMPLANTS AND GRAFTS, INITIAL ENCOUNTER: Chronic | ICD-10-CM

## 2021-03-18 DIAGNOSIS — D75.1 SECONDARY POLYCYTHEMIA: ICD-10-CM

## 2021-03-18 DIAGNOSIS — I77.0 ARTERIOVENOUS FISTULA, ACQUIRED: Chronic | ICD-10-CM

## 2021-03-18 DIAGNOSIS — Z99.2 DEPENDENCE ON RENAL DIALYSIS: Chronic | ICD-10-CM

## 2021-03-21 NOTE — PROGRESS NOTE ADULT - PROBLEM SELECTOR PLAN 6
- 3/20 CT Slightly bowed appearance to the right anterior 2nd rib without fracture line seen, question old fracture but correlate with site of pain  Ribs are otherwise intact    - Rib fracture protocol  - Pain control - MARIETTA AVF  -MWF dialysis, last on Monday (1/4) outpatient  -s/p nephro consult, HD M/W/F while inpatient (s/p HD 1/6)

## 2021-03-23 ENCOUNTER — RESULT REVIEW (OUTPATIENT)
Age: 61
End: 2021-03-23

## 2021-03-23 ENCOUNTER — LABORATORY RESULT (OUTPATIENT)
Age: 61
End: 2021-03-23

## 2021-03-23 ENCOUNTER — APPOINTMENT (OUTPATIENT)
Dept: HEMATOLOGY ONCOLOGY | Facility: CLINIC | Age: 61
End: 2021-03-23
Payer: COMMERCIAL

## 2021-03-23 VITALS
WEIGHT: 116.82 LBS | SYSTOLIC BLOOD PRESSURE: 118 MMHG | TEMPERATURE: 97.6 F | RESPIRATION RATE: 17 BRPM | DIASTOLIC BLOOD PRESSURE: 73 MMHG | BODY MASS INDEX: 22.06 KG/M2 | HEIGHT: 60.98 IN | OXYGEN SATURATION: 100 % | HEART RATE: 74 BPM

## 2021-03-23 LAB
BASOPHILS # BLD AUTO: 0.08 K/UL — SIGNIFICANT CHANGE UP (ref 0–0.2)
BASOPHILS NFR BLD AUTO: 1 % — SIGNIFICANT CHANGE UP (ref 0–2)
EOSINOPHIL # BLD AUTO: 0.31 K/UL — SIGNIFICANT CHANGE UP (ref 0–0.5)
EOSINOPHIL NFR BLD AUTO: 4 % — SIGNIFICANT CHANGE UP (ref 0–6)
HCT VFR BLD CALC: 31.2 % — LOW (ref 34.5–45)
HGB BLD-MCNC: 9.9 G/DL — LOW (ref 11.5–15.5)
LYMPHOCYTES # BLD AUTO: 0.84 K/UL — LOW (ref 1–3.3)
LYMPHOCYTES # BLD AUTO: 11 % — LOW (ref 13–44)
MCHC RBC-ENTMCNC: 31.6 PG — SIGNIFICANT CHANGE UP (ref 27–34)
MCHC RBC-ENTMCNC: 31.7 G/DL — LOW (ref 32–36)
MCV RBC AUTO: 99.7 FL — SIGNIFICANT CHANGE UP (ref 80–100)
MONOCYTES # BLD AUTO: 0.23 K/UL — SIGNIFICANT CHANGE UP (ref 0–0.9)
MONOCYTES NFR BLD AUTO: 3 % — SIGNIFICANT CHANGE UP (ref 2–14)
NEUTROPHILS # BLD AUTO: 6.22 K/UL — SIGNIFICANT CHANGE UP (ref 1.8–7.4)
NEUTROPHILS NFR BLD AUTO: 81 % — HIGH (ref 43–77)
NRBC # BLD: 0 /100 — SIGNIFICANT CHANGE UP (ref 0–0)
NRBC # BLD: SIGNIFICANT CHANGE UP /100 WBCS (ref 0–0)
PLAT MORPH BLD: NORMAL — SIGNIFICANT CHANGE UP
PLATELET # BLD AUTO: 212 K/UL — SIGNIFICANT CHANGE UP (ref 150–400)
RBC # BLD: 3.13 M/UL — LOW (ref 3.8–5.2)
RBC # FLD: 21.7 % — HIGH (ref 10.3–14.5)
RBC BLD AUTO: SIGNIFICANT CHANGE UP
WBC # BLD: 7.68 K/UL — SIGNIFICANT CHANGE UP (ref 3.8–10.5)
WBC # FLD AUTO: 7.68 K/UL — SIGNIFICANT CHANGE UP (ref 3.8–10.5)

## 2021-03-23 PROCEDURE — 38222 DX BONE MARROW BX & ASPIR: CPT | Mod: RT

## 2021-03-23 NOTE — PROCEDURE
[Bone Marrow Biopsy] : bone marrow biopsy [Bone Marrow Aspiration] : bone marrow aspiration  [Patient] : the patient [Verbal Consent Obtained] : verbal consent was obtained prior to the procedure [Patient identification verified] : patient identification verified [Procedure verified and consent obtained] : procedure verified and consent obtained [Laterality verified and correct site marked] : laterality verified and correct site marked [Right] : site: right [Correct positioning] : correct positioning [Prone] : prone [Superior iliac spine was identified] : the superior iliac spine was identified. [The right posterior iliac crest was prepped with betadine and draped, using sterile technique.] : The right posterior iliac crest was prepped with betadine and draped, using sterile technique. [Lidocaine was injected and into the periosteum overlying the site.] : Lidocaine was injected and into the periosteum overlying the site. [Aspirate] : aspirate [Cytogenetics] : cytogenetics [FISH] : FISH [Biopsy] : biopsy [Flow Cytometry] : flow cytometry [] : The patient was instructed to remove the bandage the following AM. The patient may bathe. Acetaminophen may be taken for discomfort, as per package directions.If there are any other problems, the patient was instructed to call the office. The patient verbalized understanding, and is aware of the office contact numbers. [FreeTextEntry1] : h/o JAK2 positive polycythemia vera  [FreeTextEntry2] : 8 cc of lidocaine was used for the procedure. \par \par WBC: 7.68\par Hgb: 9.9\par Hct: 31.2\par Plts: 212\par \par Bone marrow aspiration and biopsy were done. MPD panel sent to r/o Myelofibrosis.\par \par

## 2021-03-23 NOTE — REASON FOR VISIT
[Bone Marrow Biopsy] : bone marrow biopsy [Bone Marrow Aspiration] : bone marrow aspiration [FreeTextEntry2] : h/o JAK2 positive polycythemia vera

## 2021-03-24 ENCOUNTER — LABORATORY RESULT (OUTPATIENT)
Age: 61
End: 2021-03-24

## 2021-04-05 ENCOUNTER — TRANSCRIPTION ENCOUNTER (OUTPATIENT)
Age: 61
End: 2021-04-05

## 2021-04-05 ENCOUNTER — NON-APPOINTMENT (OUTPATIENT)
Age: 61
End: 2021-04-05

## 2021-04-06 ENCOUNTER — APPOINTMENT (OUTPATIENT)
Dept: DISASTER EMERGENCY | Facility: HOSPITAL | Age: 61
End: 2021-04-06

## 2021-04-07 ENCOUNTER — TRANSCRIPTION ENCOUNTER (OUTPATIENT)
Age: 61
End: 2021-04-07

## 2021-04-08 ENCOUNTER — APPOINTMENT (OUTPATIENT)
Dept: HEMATOLOGY ONCOLOGY | Facility: CLINIC | Age: 61
End: 2021-04-08
Payer: COMMERCIAL

## 2021-04-08 ENCOUNTER — APPOINTMENT (OUTPATIENT)
Dept: DISASTER EMERGENCY | Facility: HOSPITAL | Age: 61
End: 2021-04-08

## 2021-04-08 ENCOUNTER — OUTPATIENT (OUTPATIENT)
Dept: OUTPATIENT SERVICES | Facility: HOSPITAL | Age: 61
LOS: 1 days | End: 2021-04-08

## 2021-04-08 VITALS
OXYGEN SATURATION: 96 % | DIASTOLIC BLOOD PRESSURE: 69 MMHG | TEMPERATURE: 99 F | RESPIRATION RATE: 16 BRPM | SYSTOLIC BLOOD PRESSURE: 106 MMHG | HEART RATE: 81 BPM

## 2021-04-08 VITALS
RESPIRATION RATE: 18 BRPM | WEIGHT: 117.29 LBS | TEMPERATURE: 100 F | DIASTOLIC BLOOD PRESSURE: 68 MMHG | OXYGEN SATURATION: 99 % | HEART RATE: 83 BPM | HEIGHT: 62 IN | SYSTOLIC BLOOD PRESSURE: 106 MMHG

## 2021-04-08 DIAGNOSIS — U07.1 COVID-19: ICD-10-CM

## 2021-04-08 DIAGNOSIS — I77.0 ARTERIOVENOUS FISTULA, ACQUIRED: Chronic | ICD-10-CM

## 2021-04-08 DIAGNOSIS — T85.898A OTHER SPECIFIED COMPLICATION OF OTHER INTERNAL PROSTHETIC DEVICES, IMPLANTS AND GRAFTS, INITIAL ENCOUNTER: Chronic | ICD-10-CM

## 2021-04-08 DIAGNOSIS — Z99.2 DEPENDENCE ON RENAL DIALYSIS: Chronic | ICD-10-CM

## 2021-04-08 PROCEDURE — 99441: CPT

## 2021-04-08 RX ORDER — ACETAMINOPHEN 500 MG
650 TABLET ORAL ONCE
Refills: 0 | Status: COMPLETED | OUTPATIENT
Start: 2021-04-08 | End: 2021-04-08

## 2021-04-08 RX ORDER — SODIUM CHLORIDE 9 MG/ML
250 INJECTION INTRAMUSCULAR; INTRAVENOUS; SUBCUTANEOUS
Refills: 0 | Status: DISCONTINUED | OUTPATIENT
Start: 2021-04-08 | End: 2021-04-22

## 2021-04-08 RX ADMIN — SODIUM CHLORIDE 25 MILLILITER(S): 9 INJECTION INTRAMUSCULAR; INTRAVENOUS; SUBCUTANEOUS at 11:30

## 2021-04-08 RX ADMIN — Medication 650 MILLIGRAM(S): at 11:50

## 2021-04-08 RX ADMIN — Medication 650 MILLIGRAM(S): at 12:00

## 2021-04-08 NOTE — CHART NOTE - NSCHARTNOTEFT_GEN_A_CORE
CC: Monoclonal Antibody Infusion/COVID 19 Positive  60yFemale with a PMHx of ESRD (HD M/W/F), HTN, polycythemia vera, and PUD. Testing positive for COVID 4/2/21 with symptom onset 4/1/21. C/o fever, cough, malaise, fatigue. Presenting today for monoclonal antibody infusion. Follow with Dr. Hayes-PMD.    exam/findings:  T(C): 37.7 (04-08-21 @ 12:30), Max: 38 (04-08-21 @ 11:45)  HR: 83 (04-08-21 @ 12:30) (81 - 83)  BP: 112/69 (04-08-21 @ 12:30) (103/72 - 112/69)  RR: 18 (04-08-21 @ 12:30) (18 - 18)  SpO2: 98% (04-08-21 @ 12:30) (97% - 100%)      PE:   Appearance: NAD	  HEENT:   Normal oral mucosa,   Lymphatic: No lymphadenopathy  Cardiovascular: Normal S1 S2, No JVD, No murmurs, No edema  Respiratory: Lungs clear to auscultation	  Gastrointestinal:  Soft, Non-tender, + BS	  Skin: warm and dry  Neurologic: Non-focal  Extremities: Normal range of motion,    ASSESSMENT:  Pt is a 60 year old female  Covid +  referred by Dr. Hayes .who presents to infusion center for Monoclonal antibody infusion (Casirivimab/Imdevimab)  Symptoms/ Criteria: fever, cough, malaise, fatigue  Risk Profile includes: ESRD (HD M/W/F), HTN, polycythemia vera, and PUD    PLAN:  - infusion procedure explained to patient   -Consent for monoclonal antibody infusion obtained   - Risk & benifits discussed/all questions answered  -infuse  Casirivimab/Imdevimab 1200mg/1200mg IV over one hour   -observe patient for one hour post infusion        I have reviewed the Casirivimab/Imdevimab Emergency Use Authorization (EAU) and I have provided the patient or patient's caregiver with the following information:  1. FDA has authorized emergency use of Casirivimab/Imdevimab, which is not FDA-approved biologic product.  2. The patient or patient's caregiver has the option to accept or refuse administration of Casirivimab/Imdevimab  3. The significant known and benefits are unknown.  4. Information on available alternative treatments and risks and benefits of those alternatives.    Discharge:  Patient tolerated infusion well denies complaints of chest pain/SOB/dizziness/ palps  Vital signs stable for discharge home.   D/C instructions given/ fact sheet included.  Patient to follow-up with PCP as needed.

## 2021-04-10 ENCOUNTER — INPATIENT (INPATIENT)
Facility: HOSPITAL | Age: 61
LOS: 2 days | Discharge: HOME | DRG: 177 | End: 2021-04-13
Attending: HOSPITALIST | Admitting: HOSPITALIST
Payer: MEDICAID

## 2021-04-10 VITALS
HEART RATE: 79 BPM | HEIGHT: 62 IN | TEMPERATURE: 98 F | SYSTOLIC BLOOD PRESSURE: 103 MMHG | RESPIRATION RATE: 22 BRPM | OXYGEN SATURATION: 100 % | WEIGHT: 112.44 LBS | DIASTOLIC BLOOD PRESSURE: 68 MMHG

## 2021-04-10 DIAGNOSIS — R16.1 SPLENOMEGALY, NOT ELSEWHERE CLASSIFIED: ICD-10-CM

## 2021-04-10 DIAGNOSIS — I77.0 ARTERIOVENOUS FISTULA, ACQUIRED: Chronic | ICD-10-CM

## 2021-04-10 DIAGNOSIS — T85.898A OTHER SPECIFIED COMPLICATION OF OTHER INTERNAL PROSTHETIC DEVICES, IMPLANTS AND GRAFTS, INITIAL ENCOUNTER: Chronic | ICD-10-CM

## 2021-04-10 DIAGNOSIS — Z02.9 ENCOUNTER FOR ADMINISTRATIVE EXAMINATIONS, UNSPECIFIED: ICD-10-CM

## 2021-04-10 DIAGNOSIS — U07.1 COVID-19: ICD-10-CM

## 2021-04-10 DIAGNOSIS — I10 ESSENTIAL (PRIMARY) HYPERTENSION: ICD-10-CM

## 2021-04-10 DIAGNOSIS — Z99.2 DEPENDENCE ON RENAL DIALYSIS: Chronic | ICD-10-CM

## 2021-04-10 DIAGNOSIS — N18.6 END STAGE RENAL DISEASE: ICD-10-CM

## 2021-04-10 DIAGNOSIS — D61.818 OTHER PANCYTOPENIA: ICD-10-CM

## 2021-04-10 DIAGNOSIS — N18.9 CHRONIC KIDNEY DISEASE, UNSPECIFIED: ICD-10-CM

## 2021-04-10 DIAGNOSIS — D64.9 ANEMIA, UNSPECIFIED: ICD-10-CM

## 2021-04-10 DIAGNOSIS — N25.0 RENAL OSTEODYSTROPHY: ICD-10-CM

## 2021-04-10 DIAGNOSIS — Z29.9 ENCOUNTER FOR PROPHYLACTIC MEASURES, UNSPECIFIED: ICD-10-CM

## 2021-04-10 LAB
ALBUMIN SERPL ELPH-MCNC: 3.9 G/DL — SIGNIFICANT CHANGE UP (ref 3.3–5)
ALP SERPL-CCNC: 89 U/L — SIGNIFICANT CHANGE UP (ref 40–120)
ALT FLD-CCNC: 9 U/L — LOW (ref 10–45)
ANION GAP SERPL CALC-SCNC: 20 MMOL/L — HIGH (ref 5–17)
ANISOCYTOSIS BLD QL: SIGNIFICANT CHANGE UP
APTT BLD: 37.4 SEC — HIGH (ref 27.5–35.5)
AST SERPL-CCNC: 22 U/L — SIGNIFICANT CHANGE UP (ref 10–40)
BASOPHILS # BLD AUTO: 0 K/UL — SIGNIFICANT CHANGE UP (ref 0–0.2)
BASOPHILS NFR BLD AUTO: 0 % — SIGNIFICANT CHANGE UP (ref 0–2)
BILIRUB SERPL-MCNC: 1.9 MG/DL — HIGH (ref 0.2–1.2)
BLD GP AB SCN SERPL QL: NEGATIVE — SIGNIFICANT CHANGE UP
BUN SERPL-MCNC: 54 MG/DL — HIGH (ref 7–23)
CALCIUM SERPL-MCNC: 7.5 MG/DL — LOW (ref 8.4–10.5)
CHLORIDE SERPL-SCNC: 92 MMOL/L — LOW (ref 96–108)
CO2 SERPL-SCNC: 21 MMOL/L — LOW (ref 22–31)
CREAT SERPL-MCNC: 8.13 MG/DL — HIGH (ref 0.5–1.3)
CRP SERPL-MCNC: 83 MG/L — HIGH (ref 0–4)
DACRYOCYTES BLD QL SMEAR: SLIGHT — SIGNIFICANT CHANGE UP
ELLIPTOCYTES BLD QL SMEAR: SLIGHT — SIGNIFICANT CHANGE UP
EOSINOPHIL # BLD AUTO: 0 K/UL — SIGNIFICANT CHANGE UP (ref 0–0.5)
EOSINOPHIL NFR BLD AUTO: 0 % — SIGNIFICANT CHANGE UP (ref 0–6)
GLUCOSE SERPL-MCNC: 124 MG/DL — HIGH (ref 70–99)
HCT VFR BLD CALC: 21.8 % — LOW (ref 34.5–45)
HGB BLD-MCNC: 6.8 G/DL — CRITICAL LOW (ref 11.5–15.5)
HYPOCHROMIA BLD QL: SLIGHT — SIGNIFICANT CHANGE UP
INR BLD: 1.09 RATIO — SIGNIFICANT CHANGE UP (ref 0.88–1.16)
LYMPHOCYTES # BLD AUTO: 0.52 K/UL — LOW (ref 1–3.3)
LYMPHOCYTES # BLD AUTO: 29.8 % — SIGNIFICANT CHANGE UP (ref 13–44)
MACROCYTES BLD QL: SLIGHT — SIGNIFICANT CHANGE UP
MANUAL SMEAR VERIFICATION: SIGNIFICANT CHANGE UP
MCHC RBC-ENTMCNC: 31.2 GM/DL — LOW (ref 32–36)
MCHC RBC-ENTMCNC: 31.2 PG — SIGNIFICANT CHANGE UP (ref 27–34)
MCV RBC AUTO: 100 FL — SIGNIFICANT CHANGE UP (ref 80–100)
MICROCYTES BLD QL: SLIGHT — SIGNIFICANT CHANGE UP
MONOCYTES # BLD AUTO: 0.05 K/UL — SIGNIFICANT CHANGE UP (ref 0–0.9)
MONOCYTES NFR BLD AUTO: 2.6 % — SIGNIFICANT CHANGE UP (ref 2–14)
NEUTROPHILS # BLD AUTO: 1.17 K/UL — LOW (ref 1.8–7.4)
NEUTROPHILS NFR BLD AUTO: 58.8 % — SIGNIFICANT CHANGE UP (ref 43–77)
NEUTS BAND # BLD: 7.9 % — SIGNIFICANT CHANGE UP (ref 0–8)
NRBC # BLD: 2 /100 — HIGH (ref 0–0)
NT-PROBNP SERPL-SCNC: 3723 PG/ML — HIGH (ref 0–300)
OVALOCYTES BLD QL SMEAR: SLIGHT — SIGNIFICANT CHANGE UP
PLAT MORPH BLD: NORMAL — SIGNIFICANT CHANGE UP
PLATELET # BLD AUTO: 106 K/UL — LOW (ref 150–400)
POIKILOCYTOSIS BLD QL AUTO: SIGNIFICANT CHANGE UP
POLYCHROMASIA BLD QL SMEAR: SLIGHT — SIGNIFICANT CHANGE UP
POTASSIUM SERPL-MCNC: 4.2 MMOL/L — SIGNIFICANT CHANGE UP (ref 3.5–5.3)
POTASSIUM SERPL-SCNC: 4.2 MMOL/L — SIGNIFICANT CHANGE UP (ref 3.5–5.3)
PROCALCITONIN SERPL-MCNC: 21.61 NG/ML — HIGH (ref 0.02–0.1)
PROT SERPL-MCNC: 7.2 G/DL — SIGNIFICANT CHANGE UP (ref 6–8.3)
PROTHROM AB SERPL-ACNC: 13 SEC — SIGNIFICANT CHANGE UP (ref 10.6–13.6)
RBC # BLD: 2.18 M/UL — LOW (ref 3.8–5.2)
RBC # FLD: 20.4 % — HIGH (ref 10.3–14.5)
RBC BLD AUTO: ABNORMAL
RH IG SCN BLD-IMP: POSITIVE — SIGNIFICANT CHANGE UP
SARS-COV-2 RNA SPEC QL NAA+PROBE: DETECTED
SODIUM SERPL-SCNC: 133 MMOL/L — LOW (ref 135–145)
TROPONIN T, HIGH SENSITIVITY RESULT: 24 NG/L — SIGNIFICANT CHANGE UP (ref 0–51)
VARIANT LYMPHS # BLD: 0.9 % — SIGNIFICANT CHANGE UP (ref 0–6)
WBC # BLD: 1.75 K/UL — LOW (ref 3.8–10.5)
WBC # FLD AUTO: 1.75 K/UL — LOW (ref 3.8–10.5)

## 2021-04-10 PROCEDURE — 99291 CRITICAL CARE FIRST HOUR: CPT

## 2021-04-10 PROCEDURE — 99223 1ST HOSP IP/OBS HIGH 75: CPT

## 2021-04-10 PROCEDURE — 71045 X-RAY EXAM CHEST 1 VIEW: CPT | Mod: 26

## 2021-04-10 PROCEDURE — 93010 ELECTROCARDIOGRAM REPORT: CPT

## 2021-04-10 RX ORDER — DOXERCALCIFEROL 2.5 UG/1
0.5 CAPSULE ORAL
Refills: 0 | Status: DISCONTINUED | OUTPATIENT
Start: 2021-04-10 | End: 2021-04-10

## 2021-04-10 RX ORDER — ERYTHROPOIETIN 10000 [IU]/ML
10000 INJECTION, SOLUTION INTRAVENOUS; SUBCUTANEOUS
Refills: 0 | Status: DISCONTINUED | OUTPATIENT
Start: 2021-04-10 | End: 2021-04-13

## 2021-04-10 RX ORDER — DOXERCALCIFEROL 2.5 UG/1
1.5 CAPSULE ORAL
Refills: 0 | Status: DISCONTINUED | OUTPATIENT
Start: 2021-04-10 | End: 2021-04-13

## 2021-04-10 RX ORDER — ACETAMINOPHEN 500 MG
975 TABLET ORAL ONCE
Refills: 0 | Status: COMPLETED | OUTPATIENT
Start: 2021-04-10 | End: 2021-04-10

## 2021-04-10 RX ADMIN — ERYTHROPOIETIN 10000 UNIT(S): 10000 INJECTION, SOLUTION INTRAVENOUS; SUBCUTANEOUS at 21:51

## 2021-04-10 RX ADMIN — DOXERCALCIFEROL 1.5 MICROGRAM(S): 2.5 CAPSULE ORAL at 22:47

## 2021-04-10 RX ADMIN — Medication 975 MILLIGRAM(S): at 15:30

## 2021-04-10 RX ADMIN — Medication 975 MILLIGRAM(S): at 14:45

## 2021-04-10 NOTE — ED ADULT NURSE REASSESSMENT NOTE - NS ED NURSE REASSESS COMMENT FT1
Blood transfusion finished at 2030; VSS, [facility]- 30 min post VS endorses to 4 jaida aFir.

## 2021-04-10 NOTE — CONSULT NOTE ADULT - ATTENDING COMMENTS
Patient was seen and examined on 4/11/21. Patient was recently diagnosed with COVID-19 on 4/4/21. Now presents with dizziness and lightheadedness.     # ESRD - TTS (dialysis at [facility]). HD on 4/10/21. Next HD on Tuesday 4/13/21.     # Anemia - s/p 1 unit of Blood transfusion on 4/10/21. Resume 10,000 units of epo on dialysis days,     # Medication toxicity Monitoring: medication dose reviewed. Please dose medications to CrCl<10    The rest of the recommendations as per fellow's note.    Katiuska Taylor MD  Attending Nephrologist  [phone]

## 2021-04-10 NOTE — H&P ADULT - NSHPREVIEWOFSYSTEMS_GEN_ALL_CORE
REVIEW OF SYSTEMS:  CONSTITUTIONAL: No fever, chills, night sweats, or fatigue  EYES: No eye pain, visual disturbances, or discharge  ENMT:  No difficulty hearing, tinnitus, vertigo; No sinus or throat pain  NECK: No pain or stiffness  RESPIRATORY: + cough, wheezing, shortness of breath  CARDIOVASCULAR: + pleuritic chest pain, no palpitations, dizziness, or leg swelling  GASTROINTESTINAL: No abdominal or epigastric pain. + nausea, vomiting, no hematemesis; No diarrhea or constipation. No melena or hematochezia.  GENITOURINARY: No dysuria, frequency, hematuria, or incontinence  NEUROLOGICAL: No headaches, memory loss, loss of strength, numbness, or tremors  SKIN: No itching, burning, rashes, or lesions   ENDOCRINE: No heat or cold intolerance; No hair loss  MUSCULOSKELETAL: ocassional joint/back pain   PSYCHIATRIC: No depression, anxiety, mood swings, or difficulty sleeping  HEME/LYMPH: No easy bruising, or bleeding gums  ALLERGY AND IMMUNOLOGIC: No hives or eczema REVIEW OF SYSTEMS:  CONSTITUTIONAL: No, chills, night sweats, + fatigue, fevers to 104  EYES: No eye pain, visual disturbances, or discharge  ENMT:  No difficulty hearing, tinnitus, vertigo; No sinus or throat pain  NECK: No pain or stiffness  RESPIRATORY: + cough, wheezing, shortness of breath  CARDIOVASCULAR: + pleuritic chest pain, no palpitations, dizziness, or leg swelling  GASTROINTESTINAL: No abdominal or epigastric pain. + nausea, vomiting, no hematemesis; No diarrhea or constipation. No melena or hematochezia.  GENITOURINARY: No dysuria, frequency, hematuria, or incontinence  NEUROLOGICAL: No headaches, memory loss, loss of strength, numbness, or tremors  SKIN: No itching, burning, rashes, or lesions   ENDOCRINE: No heat or cold intolerance; No hair loss  MUSCULOSKELETAL: occasional joint/back pain   PSYCHIATRIC: No depression, anxiety, mood swings, or difficulty sleeping  HEME/LYMPH: No easy bruising, or bleeding gums  ALLERGY AND IMMUNOLOGIC: No hives or eczema REVIEW OF SYSTEMS:  CONSTITUTIONAL: No, chills, night sweats, + fatigue, fevers to 104 @ home.  EYES: No eye pain, visual disturbances, or discharge  ENMT:  No difficulty hearing, tinnitus, vertigo; No sinus or throat pain  NECK: No pain or stiffness  RESPIRATORY: + cough, wheezing, shortness of breath  CARDIOVASCULAR: + pleuritic chest pain, no palpitations, dizziness, or leg swelling  GASTROINTESTINAL: No abdominal or epigastric pain. + nausea, vomiting, no hematemesis; No diarrhea or constipation. No melena or hematochezia.  GENITOURINARY: No dysuria, frequency, hematuria, or incontinence  NEUROLOGICAL: No headaches, memory loss, loss of strength, numbness, or tremors  SKIN: No itching, burning, rashes, or lesions   ENDOCRINE: No heat or cold intolerance; No hair loss  MUSCULOSKELETAL: occasional joint/back pain   PSYCHIATRIC: No depression, anxiety, mood swings, or difficulty sleeping  HEME/LYMPH: No easy bruising, or bleeding gums  ALLERGY AND IMMUNOLOGIC: No hives or eczema

## 2021-04-10 NOTE — CONSULT NOTE ADULT - SUBJECTIVE AND OBJECTIVE BOX
[facility] DIVISION OF KIDNEY DISEASES AND HYPERTENSION   -- INITIAL CONSULT NOTE --  Fei Bruce, Nephrology Fellow     NS Pager: [phone] / ALBERTA Pager: [phone]  After 5pm or on weekend, please page the on-call fellow via [email or web address].  --------------------------------------------------------------------------------    HPI: [id number] PMHx ESRD on HD (MWF at [facility], Dr De León), cirrhosis, JAK2+ PCV with splenomegaly, pancreatic cysts and HTN who present to ED for shortness of breath with +COVID19 test.  Pt tested positive for covid19 pcr last week 4/4/21 soon after which experienced chills, body aches, cough, fever (tmax 104F axillary), generalized weakness, sick contact ( had similar symptoms).  Pt received monoclonal covid antibody infusion 2 days ago.  Today symptoms worsened with new onset of dizziness/lightheadedness and difficulty ambulating.      Nephrology consulted for ESRD on HD.  Patient has history kidney disease, initially on PD then transition to HD, currently goes to [facility] HD center under the care of Dr. De León.  Pt is on TTS schedule, last treatment was on Thursday 4/8/21 and target/dry weight is 53kg.  In ED, afebrile, CXR show b/l patchy consistent w/ multifocal infection w/ labs significant for hgb 6.8, wbc 1.75 and pt given 1u blood transfusion.  Pt seen and examined in ED, vitals non tachycardic, non hypotensive appears non-toxic w/ good bruits/thrills in LUE AVF.    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  ESRD on peritoneal dialysis  Cirrhosis of liver without ascites, unspecified hepatic cirrhosis type  Pancreatic cyst  Peritoneal dialysis catheter in place Placed 8/9/2017  Hemoperitoneum as complication of peritoneal dialysis s/p removal 9/18  AV fistula Placed 9/18    FAMILY HISTORY:  Family history of hypertension in mother  Family history of malignant neoplasm (Grandparent)  head and neck in father    PAST SOCIAL HISTORY:  ALLERGIES & MEDICATIONS  --------------------------------------------------------------------------------  Allergies  No Known Allergies    Intolerances    Standing Inpatient Medications    PRN Inpatient Medications    REVIEW OF SYSTEMS  Gen: no fever. + chills, weakness  Respiratory: No dyspnea  CV: No chest pain  GI: No abdominal pain, nausea, vomiting, diarrhea  MSK: no edema  Neuro: + dizziness  All other systems were reviewed and are negative, except as noted.    VITALS/PHYSICAL EXAM  --------------------------------------------------------------------------------  T(C): 36.4 (04-10-21 @ 16:55), Max: 36.6 (04-10-21 @ 12:33)  HR: 81 (04-10-21 @ 16:55) (74 - 81)  BP: 107/66 (04-10-21 @ 16:55) (102/70 - 107/74)  RR: 20 (04-10-21 @ 16:55) (20 - 22)  SpO2: 99% (04-10-21 @ 16:55) (99% - 100%)  Wt(kg): --  Height (cm): 157.5 (04-10-21 @ 12:33)  Weight (kg): 51 (04-10-21 @ 12:33)  BMI (kg/m2): 20.6 (04-10-21 @ 12:33)  BSA (m2): 1.5 (04-10-21 @ 12:33)    Physical Exam:              Gen: NAD, well-appearing on room air  	Pulm: CTA B/L, no crackles  	CV: RRR, S1S2; no rub/murmur  	GI: +BS, soft, nontender/nondistended  	MSK: no edema              Neuro: AAOx3  	Psych: Normal affect and mood  	Skin: Warm  	Vascular access: LUE AVF +thrills/bruits    LABS/STUDIES  --------------------------------------------------------------------------------                6.8    1.75  >-----------<  106      [04-10-21 @ 14:58]              21.8     133  |  92  |  54  ----------------------------<  124      [04-10-21 @ 14:58]  4.2   |  21  |  8.13        Ca     7.5     [04-10-21 @ 14:58]    TPro  7.2  /  Alb  3.9  /  TBili  1.9  /  DBili  x   /  AST  22  /  ALT  9   /  AlkPhos  89  [04-10-21 @ 14:58]    PT/INR: PT 13.0 , INR 1.09       [04-10-21 @ 14:58]  PTT: 37.4       [04-10-21 @ 14:58]    Creatinine Trend:  SCr 8.13 [04-10 @ 14:58]    Urinalysis - [01-09-21 @ 12:32]      Color Yellow / Appearance Slightly Turbid / SG 1.021 / pH 8.0      Gluc Trace / Ketone Trace  / Bili Negative / Urobili Negative       Blood Trace / Protein 300 mg/dL / Leuk Est Large / Nitrite Negative      RBC 1 /  / Hyaline 8 / Gran  / Sq Epi  / Non Sq Epi 18 / Bacteria Many    Iron 56, TIBC 150, %sat 37      [01-09-21 @ 12:20]  Ferritin 640      [01-09-21 @ 11:24]  HbA1c 4.9      [06-01-19 @ 00:46]    HBsAb <3.0      [03-13-18 @ 15:04]  HBsAb Reactive      [01-25-20 @ 16:12]  HBsAg Nonreact      [05-05-18 @ 15:09]  HBcAb Nonreact      [03-13-18 @ 15:04]  HCV 0.21, Nonreact      [05-05-18 @ 15:09]  HIV Nonreact      [01-19-17 @ 20:01]    RAHUL: titer 1:80, pattern Homogeneous      [05-16-18 @ 22:43]  C3 Complement 100      [01-19-17 @ 20:01]  C4 Complement 32      [01-19-17 @ 20:01]  Rheumatoid Factor <7.0      [01-19-17 @ 20:01]  ANCA: cANCA Negative, pANCA Negative, atypical ANCA Negative      [01-19-17 @ 20:01]  anti-GBM <0.2      [01-20-17 @ 01:52]  ASLO 59      [01-19-17 @ 20:01]  Syphilis Screen (Treponema Pallidum Ab) Negative      [01-19-17 @ 20:01]  Free Light Chains: kappa 12.00, lambda 14.10, ratio = 0.85      [01-23 @ 14:39]  Immunofixation Serum:   No Monoclonal Band Identified      [01-23-17 @ 14:39]  SPEP Interpretation: Polyclonal Gammopathy      [01-23-17 @ 14:39]   [facility] DIVISION OF KIDNEY DISEASES AND HYPERTENSION   -- INITIAL CONSULT NOTE --  Fei Bruce, Nephrology Fellow     NS Pager: [phone] / ALBERTA Pager: [phone]  After 5pm or on weekend, please page the on-call fellow via [email or web address].  --------------------------------------------------------------------------------    HPI: 60F PMHx ESRD on HD (TTS at [facility], Dr De León), cirrhosis, JAK2+ PCV with splenomegaly, pancreatic cysts and HTN who present to ED for shortness of breath with +COVID19 test.  Pt tested positive for covid19 pcr last week 4/4/21 soon after which experienced chills, body aches, cough, fever (tmax 104F axillary), generalized weakness, sick contact ( had similar symptoms).  Pt received monoclonal covid antibody infusion 2 days ago.  Today symptoms worsened with new onset of dizziness/lightheadedness and difficulty ambulating.      Nephrology consulted for ESRD on HD.  Patient has history kidney disease, initially on PD then transition to HD, currently goes to [facility] HD center under the care of Dr. De León.  Pt is on TTS schedule, last treatment was on Thursday 4/8/21 and target/dry weight is 53kg.  In ED, afebrile, CXR show b/l patchy consistent w/ multifocal infection w/ labs significant for hgb 6.8, wbc 1.75 and pt given 1u blood transfusion.  Pt seen and examined in ED, vitals non tachycardic, non hypotensive appears non-toxic w/ good bruits/thrills in LUE AVF.    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  ESRD on peritoneal dialysis  Cirrhosis of liver without ascites, unspecified hepatic cirrhosis type  Pancreatic cyst  Peritoneal dialysis catheter in place Placed 8/9/2017  Hemoperitoneum as complication of peritoneal dialysis s/p removal 9/18  AV fistula Placed 9/18    FAMILY HISTORY:  Family history of hypertension in mother  Family history of malignant neoplasm (Grandparent)  head and neck in father    PAST SOCIAL HISTORY:  ALLERGIES & MEDICATIONS  --------------------------------------------------------------------------------  Allergies  No Known Allergies    Intolerances    Standing Inpatient Medications    PRN Inpatient Medications    REVIEW OF SYSTEMS  Gen: no fever. + chills, weakness  Respiratory: No dyspnea  CV: No chest pain  GI: No abdominal pain, nausea, vomiting, diarrhea  MSK: no edema  Neuro: + dizziness  All other systems were reviewed and are negative, except as noted.    VITALS/PHYSICAL EXAM  --------------------------------------------------------------------------------  T(C): 36.4 (04-10-21 @ 16:55), Max: 36.6 (04-10-21 @ 12:33)  HR: 81 (04-10-21 @ 16:55) (74 - 81)  BP: 107/66 (04-10-21 @ 16:55) (102/70 - 107/74)  RR: 20 (04-10-21 @ 16:55) (20 - 22)  SpO2: 99% (04-10-21 @ 16:55) (99% - 100%)  Wt(kg): --  Height (cm): 157.5 (04-10-21 @ 12:33)  Weight (kg): 51 (04-10-21 @ 12:33)  BMI (kg/m2): 20.6 (04-10-21 @ 12:33)  BSA (m2): 1.5 (04-10-21 @ 12:33)    Physical Exam:              Gen: NAD, well-appearing on room air  	Pulm: CTA B/L, no crackles  	CV: RRR, S1S2; no rub/murmur  	GI: +BS, soft, nontender/nondistended  	MSK: no edema              Neuro: AAOx3  	Psych: Normal affect and mood  	Skin: Warm  	Vascular access: LUE AVF +thrills/bruits    LABS/STUDIES  --------------------------------------------------------------------------------                6.8    1.75  >-----------<  106      [04-10-21 @ 14:58]              21.8     133  |  92  |  54  ----------------------------<  124      [04-10-21 @ 14:58]  4.2   |  21  |  8.13        Ca     7.5     [04-10-21 @ 14:58]    TPro  7.2  /  Alb  3.9  /  TBili  1.9  /  DBili  x   /  AST  22  /  ALT  9   /  AlkPhos  89  [04-10-21 @ 14:58]    PT/INR: PT 13.0 , INR 1.09       [04-10-21 @ 14:58]  PTT: 37.4       [04-10-21 @ 14:58]    Creatinine Trend:  SCr 8.13 [04-10 @ 14:58]    Urinalysis - [01-09-21 @ 12:32]      Color Yellow / Appearance Slightly Turbid / SG 1.021 / pH 8.0      Gluc Trace / Ketone Trace  / Bili Negative / Urobili Negative       Blood Trace / Protein 300 mg/dL / Leuk Est Large / Nitrite Negative      RBC 1 /  / Hyaline 8 / Gran  / Sq Epi  / Non Sq Epi 18 / Bacteria Many    Iron 56, TIBC 150, %sat 37      [01-09-21 @ 12:20]  Ferritin 640      [01-09-21 @ 11:24]  HbA1c 4.9      [06-01-19 @ 00:46]    HBsAb <3.0      [03-13-18 @ 15:04]  HBsAb Reactive      [01-25-20 @ 16:12]  HBsAg Nonreact      [05-05-18 @ 15:09]  HBcAb Nonreact      [03-13-18 @ 15:04]  HCV 0.21, Nonreact      [05-05-18 @ 15:09]  HIV Nonreact      [01-19-17 @ 20:01]    RAHUL: titer 1:80, pattern Homogeneous      [05-16-18 @ 22:43]  C3 Complement 100      [01-19-17 @ 20:01]  C4 Complement 32      [01-19-17 @ 20:01]  Rheumatoid Factor <7.0      [01-19-17 @ 20:01]  ANCA: cANCA Negative, pANCA Negative, atypical ANCA Negative      [01-19-17 @ 20:01]  anti-GBM <0.2      [01-20-17 @ 01:52]  ASLO 59      [01-19-17 @ 20:01]  Syphilis Screen (Treponema Pallidum Ab) Negative      [01-19-17 @ 20:01]  Free Light Chains: kappa 12.00, lambda 14.10, ratio = 0.85      [01-23 @ 14:39]  Immunofixation Serum:   No Monoclonal Band Identified      [01-23-17 @ 14:39]  SPEP Interpretation: Polyclonal Gammopathy      [01-23-17 @ 14:39]

## 2021-04-10 NOTE — ED PROVIDER NOTE - CRITICAL CARE ATTENDING CONTRIBUTION TO CARE
Leatha Dodson MD - Attending Physician: I have personally seen and examined this patient. I have discussed the case with the ACP. I have reviewed all pertinent clinical information, including history, physical exam, plan and the ACP’s note and agree except as noted. See MDM

## 2021-04-10 NOTE — ED PROVIDER NOTE - CONTACT TIME
Patient would like refill of Citalopram 10 mg tab take 1 daily sent to [facility] in [place] 
10-Apr-2021 16:48

## 2021-04-10 NOTE — H&P ADULT - NSHPLABSRESULTS_GEN_ALL_CORE
LABS:                           6.8    1.75  )-----------( 106      ( 10 Apr 2021 14:58 )             21.8     04-10    133<L>  |  92<L>  |  54<H>  ----------------------------<  124<H>  4.2   |  21<L>  |  8.13<H>    Ca    7.5<L>      10 Apr 2021 14:58    TPro  7.2  /  Alb  3.9  /  TBili  1.9<H>  /  DBili  x   /  AST  22  /  ALT  9<L>  /  AlkPhos  89  04-10        PT/INR - ( 10 Apr 2021 14:58 )   PT: 13.0 sec;   INR: 1.09 ratio         PTT - ( 10 Apr 2021 14:58 )  PTT:37.4 sec          LIVER FUNCTIONS - ( 10 Apr 2021 14:58 )  Alb: 3.9 g/dL / Pro: 7.2 g/dL / ALK PHOS: 89 U/L / ALT: 9 U/L / AST: 22 U/L / GGT: x                 I&O's Summary         / Troponin T, High Sensitivity Result: 24 ng/L (04-10-21 @ 14:58)      CAPILLARY BLOOD GLUCOSE                MICRO: LABS:                           6.8    1.75  )-----------( 106      ( 10 Apr 2021 14:58 )             21.8     04-10    133<L>  |  92<L>  |  54<H>  ----------------------------<  124<H>  4.2   |  21<L>  |  8.13<H>    Ca    7.5<L>      10 Apr 2021 14:58    TPro  7.2  /  Alb  3.9  /  TBili  1.9<H>  /  DBili  x   /  AST  22  /  ALT  9<L>  /  AlkPhos  89  04-10        PT/INR - ( 10 Apr 2021 14:58 )   PT: 13.0 sec;   INR: 1.09 ratio         PTT - ( 10 Apr 2021 14:58 )  PTT:37.4 sec          LIVER FUNCTIONS - ( 10 Apr 2021 14:58 )  Alb: 3.9 g/dL / Pro: 7.2 g/dL / ALK PHOS: 89 U/L / ALT: 9 U/L / AST: 22 U/L / GGT: x             NEPHRO CONSULT APPRECIATED.

## 2021-04-10 NOTE — ED PROVIDER NOTE - PHYSICAL EXAMINATION
NAD, VSS, Afebrile, Neck supple. Diminished lung sounds bilaterally. ABD soft, non tender. No cva tender. No peripheral edema. No focal neuro deficit.

## 2021-04-10 NOTE — ED PROVIDER NOTE - PROGRESS NOTE DETAILS
Leatha Dodson MD - Attending Physician: Hb < 7, pt so fatigued is unable to walk. Missed HD today. Will transfuse and admit for further care. Pt denies any bleeding. Spoked to Renal Dr. Bruce for consult.

## 2021-04-10 NOTE — H&P ADULT - PROBLEM SELECTOR PLAN 3
Hemodialysis on T/Th/Sat, AV fistula 3 years ago  - continue dialysis inpatient  - nephrology consulted

## 2021-04-10 NOTE — H&P ADULT - HISTORY OF PRESENT ILLNESS
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 Tested positive on COVID 4/4 after she start having symptoms the day before; reports fevers/chills, body aches, cough, nausea, pleuritic chest pain with associated cough, worse with inspiration. Had monoclonal infusion 2 days ago. Today she reports that her symptoms have been worsening with associated dizziness/lightheadedness and difficulty ambulating. Did not have dialysis session today.      Of note patient was recently in hospital in January for abdominal pain, found to have increased splenomegaly with upper abdominal and splenic varices. 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 EGD did show evidence of isolated gastric varices with no active hemorrhage and started on prophylactic propanolol on discharge.     In the ED, patient was aefbrile, hemodynamically stable. CXR significant with patchy bilateral lower lung opacities compatible with multifocal infection. Found to be pancytopenic WBC 1.75, Hgb 6.8, Pls 106. Creatinine elevated to 8.13.  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 Tested positive on COVID 4/4 after she start having symptoms the day before; reports fevers/chills, body aches, cough, nausea, pleuritic chest pain with associated cough, worse with inspiration. Had monoclonal infusion 2 days ago. Today she reports that her symptoms have been worsening with associated dizziness/lightheadedness and difficulty ambulating. Did not have dialysis session today.      Of note patient was recently in hospital in January for abdominal pain, found to have increased splenomegaly with upper abdominal and splenic varices. 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 EGD did show evidence of isolated gastric varices with no active hemorrhage and started on prophylactic propanolol on discharge.     In the ED, patient was aefbrile, hemodynamically stable. CXR significant with patchy bilateral lower lung opacities compatible with multifocal infection. Found to be pancytopenic WBC 1.75, Hgb 6.8, Pls 106. Creatinine elevated to 8.13. Received 1 unit pRBC in the ED. 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 Tested positive on COVID 4/4 after she start having symptoms the day before; reports fevers/chills, body aches, cough, nausea, pleuritic chest pain with associated cough, worse with inspiration. Had monoclonal infusion 2 days ago for COVID therapy, since then her symptoms have been worsening. Today she reports that her symptoms have been worsening with associated dizziness/lightheadedness and difficulty ambulating. Reports nausea with retching, but no vomiting. Did not have dialysis session today.      Of note patient was recently in hospital in January for abdominal pain, found to have increased splenomegaly with upper abdominal and splenic varices. 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 EGD did show evidence of isolated gastric varices with no active hemorrhage and started on prophylactic propanolol on discharge.     In the ED, patient was aefbrile, hemodynamically stable. CXR significant with patchy bilateral lower lung opacities compatible with multifocal infection. Found to be pancytopenic WBC 1.75, Hgb 6.8, Pls 106. Creatinine elevated to 8.13. Received 1 unit pRBC in the ED. 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 Tested positive on COVID 4/4 after she start having symptoms the day before; reports fevers/chills, body aches, cough, nausea, pleuritic chest pain with associated cough, worse with inspiration. Had monoclonal infusion 2 days ago for COVID therapy, since then her symptoms have been worsening. Never had COVID vaccination completed. Today she reports that her symptoms have been worsening with associated dizziness/lightheadedness and difficulty ambulating. Reports nausea with retching, but no vomiting. States that multiple family members was recently diagnosed with COVID with mild symptoms. Did not have dialysis session today.      Of note patient was recently in hospital in January for abdominal pain, found to have increased splenomegaly with upper abdominal and splenic varices. 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 EGD did show evidence of isolated gastric varices with no active hemorrhage and started on prophylactic propanolol on discharge.     In the ED, patient was aefbrile, hemodynamically stable. CXR significant with patchy bilateral lower lung opacities compatible with multifocal infection. Found to be pancytopenic WBC 1.75, Hgb 6.8, Pls 106. Creatinine elevated to 8.13. Received 1 unit pRBC in the ED.

## 2021-04-10 NOTE — ED PROVIDER NOTE - CLINICAL SUMMARY MEDICAL DECISION MAKING FREE TEXT BOX
Leatha Dodson MD - Attending Physician: Pt here with known COVID, day 6, s/p MAB. Significant worsening fatigue and weakness, was unable to walk today due to symptoms. Fever resolved yesterday, not significantly SOB. Unable to get to HD today. Labs, Xr for eval, likely needs admit

## 2021-04-10 NOTE — ED ADULT NURSE NOTE - CHIEF COMPLAINT QUOTE
covid pos 4/4, shortness of breath, dialysis patient Tu, Th, Sat - did not have it today.   daughter - Kimberly  - [phone]

## 2021-04-10 NOTE — H&P ADULT - ASSESSMENT
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

## 2021-04-10 NOTE — H&P ADULT - PROBLEM SELECTOR PLAN 4
History of splenomegaly and gastric varices seen on previous imaging due to PCV  - evidence of extramedullary hematopoesis on previous imaging  - continue prophylactic propanolol for varices History of splenomegaly and gastric varices seen on previous imaging due to PCV  - history of JAK2+ PCV, not on hydroxyurea  - evidence of extramedullary hematopoesis on previous imaging  - hemolysis work up sent for possible autoimmune hemolysis in setting of elevated D-Bili  - continue prophylactic propanolol for varices

## 2021-04-10 NOTE — ED ADULT TRIAGE NOTE - CHIEF COMPLAINT QUOTE
covid pos 4/4, shortness of breath, dialysis patient Tu, Th, Sat - did not have it today.   daughter - Kimberly  - [phone]

## 2021-04-10 NOTE — H&P ADULT - ATTENDING COMMENTS
Patient seen and examined today. I agree with the above findings, assessment, and plan with the following additions and exceptions:     #. Pancytopenia -- Ddx broad. Unclear etiology at this time. Given fever at home, would send Bcx. But as no clear source of infection, we will monitor off abx. Procal noted. Low threshold to start abx if sepsis develops. T. Bili elevation noted in setting of anemia. Check LDH, haptoglobin, smear, and direct anyi. No s/s of blood loss. 1 unit of pRRB going. HOLD Hydrea for now given risk of further myelosuppression. I am not aware of any literature regarding monoclonal ab therapy causing pancytopenia. We will look more into this. For now, check b12, folate, tsh, and hep panel. Hold off on other rheum or autoimmune causes. Heme/onc consult likely warranted in AM to assist with narrowing our ddx.  #. ESRD -- HD tonight. C/w Hectorol, EPO, an Renal diet. Nephrology assistance appreciated.   #. COVID -19 -- no clear indication for remdesivir or decadron. On. RA. Continue incentive spirometer.   Rest of plan as above    Dr. Mick Khan DO  Attending Physician  Division of Hospital Medicine  [facility]  Pager:  [phone] Patient seen and examined today. I agree with the above findings, assessment, and plan with the following additions and exceptions:     #. Pancytopenia -- Ddx broad. Unclear etiology at this time. Given fever at home, would send Bcx. But as no clear source of infection, we will monitor off abx. Procal noted. Low threshold to start abx if sepsis develops. T. Bili elevation noted in setting of anemia. Check LDH, haptoglobin, smear, and direct aniy. No s/s of blood loss. 1 unit of pRRB going. HOLD Hydrea for now given risk of further myelosuppression. I am not aware of any literature regarding monoclonal ab therapy causing pancytopenia. We will look more into this. For now, check b12, folate, tsh, and hep panel. Hold off on other rheum or autoimmune causes. Heme/onc consult likely warranted in AM to assist with narrowing our ddx.  #. ESRD -- HD tonight. C/w Hectorol, EPO, an Renal diet. Nephrology assistance appreciated.   #. COVID -19 -- no clear indication for remdesivir or decadron. On. RA. Continue incentive spirometer. Dyspnea may be from post-covid fibrosis or atelectasis. Volume overload possible as well. HD as above.   Rest of plan as above    Dr. Mick Khan DO  Attending Physician  Division of Hospital Medicine  [facility]  Pager:  [phone] Patient seen and examined today. I agree with the above findings, assessment, and plan with the following additions and exceptions:     #. Pancytopenia -- Ddx broad. Unclear etiology at this time. Given fever at home, would send Bcx. But as no clear source of infection, we will monitor off abx. Procal noted. Low threshold to start abx if sepsis develops. T. Bili elevation noted in setting of anemia. Check LDH, haptoglobin, smear, and direct anyi. No s/s of blood loss. 1 unit of pRRB going. HOLD Hydrea for now given risk of further myelosuppression. I am not aware of any literature regarding monoclonal ab therapy causing pancytopenia. We will look more into this. For now, check b12, folate, tsh, and hep panel. Hold off on other rheum or autoimmune causes. Heme/onc consult likely warranted in AM to assist with narrowing our ddx.  #. ESRD -- HD tonight. C/w Hectorol, EPO, an Renal diet. Nephrology assistance appreciated.   #. COVID -19 -- no clear indication for remdesivir or decadron. On. RA. Continue incentive spirometer. Dyspnea may be from post-covid fibrosis or atelectasis. Volume overload possible as well. HD as above.   #. Gastric varices -- continue home bb to reduce portal HTN. Monitor vitals.  Rest of plan as above    Dr. Mick Khan DO  Attending Physician  Division of Hospital Medicine  [facility]  Pager:  [phone]

## 2021-04-10 NOTE — ED PROVIDER NOTE - OBJECTIVE STATEMENT
61yo female pt with PMHx of Polycythemia, HTN, ESRD on HD (T,T,Sat), Splenomegaly presents to ED with COVID+ and worsening SOB, fatigue today. Pt stated she had covid symptoms (fever/chills, bodyache, mild headache, cough, N/D, CP with coughing and SOB) on 4/3/2021 and confirmed COVID+ on 4/4/21. She also had Monoclonal infusion on 4/8/21. Last fever was 104 on 4/8/21 after Monoclonal Tx and took Tylenol with improvement. She noticed worsening SOB with generalized weakness and dizziness this morning and unable to ambulate. She missed HD today. Denies ABD pain or vomiting. Pt's still making urine and denies urinary pain or frequency. 59yo female pt with PMHx of Polycythemia, HTN, ESRD on HD (T,T,Sat), Splenomegaly presents to ED with COVID+ and worsening SOB, fatigue today. Pt stated she had covid symptoms (fever/chills, bodyache, mild headache, cough, N/D, CP with coughing and SOB) on 4/3/2021 and confirmed COVID+ on 4/4/21. She also had Monoclonal infusion on 4/8/21. Last fever was 104 on 4/8/21 after Monoclonal Tx and took Tylenol with improvement. She noticed worsening SOB with generalized weakness and dizziness this morning and unable to ambulate. She missed HD today. Denies ABD pain or vomiting. Pt's still making urine and denies urinary pain or frequency.  PMD Dr. Azucena De León ([phone])

## 2021-04-10 NOTE — H&P ADULT - PROBLEM SELECTOR PLAN 2
Leukopenia, thrombocytopenia and anemic on CBC on admission; compared to previous CBC few weeks ago  - s/p 1 unit pRBC , monitor Hgb/Hct and CBC  - given history of polycythemia vera, could have possible auto-immune component as well as recent monoclonal antibody therapy   - per daughter, patients symptoms worsened after the treatment Leukopenia, thrombocytopenia and anemic on CBC on admission; compared to previous CBC few weeks ago  - s/p 1 unit pRBC , monitor Hgb/Hct and CBC  - currently on hydroxyurea 500 mg TIW after dialysis  - given history of JAK2+ polycythemia vera, could have possible auto-immune component as well as recent monoclonal antibody therapy   - per daughter, patients symptoms worsened after the treatment  - ordered iron deficiency work up

## 2021-04-10 NOTE — H&P ADULT - PROBLEM SELECTOR PLAN 6
DVT: Heparin subQ  Diet: Renal Diet  Dispo: DVT: hold chemical prophylaxis   Diet: Renal Diet  Dispo:

## 2021-04-10 NOTE — ED ADULT NURSE NOTE - OBJECTIVE STATEMENT
59 y/o female  COVID+, pt c/o worsening SOB, dizziness and fatigue today.  Pt states she has COVID symptoms on 4/3  (fever, chills, body aches, mild headache, cough, SOB, chest pain) N/D, CP with coughing and SOB) and confirmed COVID+ on 4/4/21.  Pt states she is on dialysis, so she missed HD today (dialysis days: Tues - Thurs - Sat).  Left arm fistula, (+) thrill.   Pt denies abd pain, vomiting, urinary frequency/ pain.  No acute respiratory distress noted. 59 y/o female  COVID+, pt c/o worsening SOB, dizziness and fatigue today.  Pt states she has COVID symptoms on 4/3  (fever, chills, body aches, mild headache, cough, SOB, chest pain) N/D, CP with coughing and SOB) and confirmed COVID+ on 4/4/21.  Pt states she is on dialysis, so she missed HD today (dialysis days: Tues - Thurs - Sat).  Left arm fistula, (+) thrill.   Pt denies abd pain, vomiting, urinary frequency/ pain.  No acute respiratory distress noted.    Pt is a hard stick, unable to insert IV lock, (left arm fistula), NP Kaye Fatima was informed.

## 2021-04-10 NOTE — CONSULT NOTE ADULT - ASSESSMENT
60F PMHx ESRD on HD (MWF at [facility], Dr De León), cirrhosis, JAK2+ PCV with splenomegaly - admitted for anemia, covid19.  Nephrology consulted for ESRD on HD      # ESRD   Pt. with ESRD on HD three times a week (MW @ [facility], Dr. De León). Last HD was on 4/8/21 via LUE AVF, target/dry weight 53kg. Pt. clinically stable. Labs reviewed.   - plan for maintenance HD today UF goal 1.0L as BP tolerate.    - monitor BMP, fluid restriction, renally dose medications per HD, renal diet    # Hypertension  BP in acceptable range. Can resume home med. Low salt diet advised    # Anemia   Pt. with anemia in the setting of ESRD.  On admission Hgb level below acceptable target (hgb 6.8). s/p 1u prbc 4/10  - pt receiving 1u prbc  - will resume outpatient epogen 10,000U on HD days and monitor Hgb    # Renal bone disease  Pt. with hyperphosphatemia in the setting of ESRD  - resume outpatient Hectorol 1.5mcg IVP on HD days and calcium acetate 1334 mg TID with meals. Low phosphorus diet advised. Monitor serum phosphorus    Plan discussed w/ Dr Taylor and primary team 60F PMHx ESRD on HD (TTSat [facility], Dr De León), cirrhosis, JAK2+ PCV with splenomegaly - admitted for anemia, covid19.  Nephrology consulted for ESRD on HD      # ESRD   Pt. with ESRD on HD three times a week (TTS @ [facility], Dr. De León). Last HD was on 4/8/21 via LUE AVF, target/dry weight 53kg. Pt. clinically stable. Labs reviewed.   - plan for maintenance HD today UF goal 1.0L as BP tolerate.    - monitor BMP, fluid restriction, renally dose medications per HD, renal diet    # Hypertension  BP in acceptable range. Can resume home med. Low salt diet advised    # Anemia   Pt. with anemia in the setting of ESRD.  On admission Hgb level below acceptable target (hgb 6.8). s/p 1u prbc 4/10  - pt receiving 1u prbc  - will resume outpatient epogen 10,000U on HD days and monitor Hgb    # Renal bone disease  Pt. with hyperphosphatemia in the setting of ESRD  - resume outpatient Hectorol 1.5mcg IVP on HD days and calcium acetate 1334 mg TID with meals. Low phosphorus diet advised. Monitor serum phosphorus    Plan discussed w/ Dr Taylor and primary team

## 2021-04-10 NOTE — H&P ADULT - PROBLEM SELECTOR PLAN 1
Tested positive for COVID-19 on 4/4, symptomatic with shortness of breath, lethargy, weakness, worsening after monoclonal antibody therapy  - symptomatic for the past 5 days, has had sick contacts, multiple family members positive   - COVID PCR positive, CXR consistent with COVID pneumonia  - procalcitonin elevated to 20, likely also increased in setting of ESRD   - currently saturating well on RA  - no evidence of fevers ; no indications for remdesivir/dexamethasone at this time Tested positive for COVID-19 on 4/4, symptomatic with shortness of breath, lethargy, weakness, worsening after monoclonal antibody therapy  - symptomatic for the past 5 days, has had sick contacts, multiple family members positive   - COVID PCR positive, CXR consistent with COVID pneumonia  - procalcitonin elevated to 20, likely also increased in setting of ESRD   - currently saturating well on RA  - started on Xarelto 10 mg PO daily x 2 weeks for thombophylaxis by hematologist   - no evidence of fevers ; no indications for remdesivir/dexamethasone at this time Tested positive for COVID-19 on 4/4, symptomatic with shortness of breath, lethargy, weakness, worsening after monoclonal antibody therapy  - symptomatic for the past 5 days, has had sick contacts, multiple family members positive   - COVID PCR positive, CXR consistent with COVID pneumonia  - procalcitonin elevated to 20, likely also increased in setting of ESRD   - currently saturating well on RA  - started on Xarelto 10 mg PO daily x 2 weeks for thombophylaxis by hematologist; will not start due to kidney function  - hold chemical prophylaxis at this time until platelets improve   - no evidence of fevers ; no indications for remdesivir/dexamethasone at this time

## 2021-04-11 ENCOUNTER — TRANSCRIPTION ENCOUNTER (OUTPATIENT)
Age: 61
End: 2021-04-11

## 2021-04-11 LAB
ALBUMIN SERPL ELPH-MCNC: 4.1 G/DL — SIGNIFICANT CHANGE UP (ref 3.3–5)
ALP SERPL-CCNC: 95 U/L — SIGNIFICANT CHANGE UP (ref 40–120)
ALT FLD-CCNC: 9 U/L — LOW (ref 10–45)
ANION GAP SERPL CALC-SCNC: 15 MMOL/L — SIGNIFICANT CHANGE UP (ref 5–17)
APTT BLD: 37.6 SEC — HIGH (ref 27.5–35.5)
AST SERPL-CCNC: 23 U/L — SIGNIFICANT CHANGE UP (ref 10–40)
BILIRUB DIRECT SERPL-MCNC: 0.4 MG/DL — HIGH (ref 0–0.2)
BILIRUB INDIRECT FLD-MCNC: 1.7 MG/DL — HIGH (ref 0.2–1)
BILIRUB SERPL-MCNC: 2.1 MG/DL — HIGH (ref 0.2–1.2)
BILIRUB SERPL-MCNC: 2.1 MG/DL — HIGH (ref 0.2–1.2)
BUN SERPL-MCNC: 19 MG/DL — SIGNIFICANT CHANGE UP (ref 7–23)
CALCIUM SERPL-MCNC: 8.5 MG/DL — SIGNIFICANT CHANGE UP (ref 8.4–10.5)
CHLORIDE SERPL-SCNC: 94 MMOL/L — LOW (ref 96–108)
CO2 SERPL-SCNC: 26 MMOL/L — SIGNIFICANT CHANGE UP (ref 22–31)
CREAT SERPL-MCNC: 4.14 MG/DL — HIGH (ref 0.5–1.3)
DAT POLY-SP REAG RBC QL: NEGATIVE — SIGNIFICANT CHANGE UP
FERRITIN SERPL-MCNC: 1327 NG/ML — HIGH (ref 15–150)
FERRITIN SERPL-MCNC: 969 NG/ML — HIGH (ref 15–150)
FOLATE SERPL-MCNC: >20 NG/ML — SIGNIFICANT CHANGE UP
GLUCOSE SERPL-MCNC: 86 MG/DL — SIGNIFICANT CHANGE UP (ref 70–99)
HBV CORE AB SER-ACNC: SIGNIFICANT CHANGE UP
HBV SURFACE AB SER-ACNC: 9.5 MIU/ML — LOW
HBV SURFACE AG SER-ACNC: SIGNIFICANT CHANGE UP
HCT VFR BLD CALC: 27.8 % — LOW (ref 34.5–45)
HCT VFR BLD CALC: 28.3 % — LOW (ref 34.5–45)
HCV AB S/CO SERPL IA: 0.21 S/CO — SIGNIFICANT CHANGE UP (ref 0–0.99)
HCV AB SERPL-IMP: SIGNIFICANT CHANGE UP
HGB BLD-MCNC: 8.8 G/DL — LOW (ref 11.5–15.5)
HGB BLD-MCNC: 9.1 G/DL — LOW (ref 11.5–15.5)
HIV 1+2 AB+HIV1 P24 AG SERPL QL IA: SIGNIFICANT CHANGE UP
INR BLD: 1.02 RATIO — SIGNIFICANT CHANGE UP (ref 0.88–1.16)
IRON SATN MFR SERPL: 38 % — SIGNIFICANT CHANGE UP (ref 14–50)
IRON SATN MFR SERPL: 42 UG/DL — SIGNIFICANT CHANGE UP (ref 30–160)
LDH SERPL L TO P-CCNC: 328 U/L — HIGH (ref 50–242)
MAGNESIUM SERPL-MCNC: 2.1 MG/DL — SIGNIFICANT CHANGE UP (ref 1.6–2.6)
MCHC RBC-ENTMCNC: 30.9 PG — SIGNIFICANT CHANGE UP (ref 27–34)
MCHC RBC-ENTMCNC: 31.4 PG — SIGNIFICANT CHANGE UP (ref 27–34)
MCHC RBC-ENTMCNC: 31.7 GM/DL — LOW (ref 32–36)
MCHC RBC-ENTMCNC: 32.2 GM/DL — SIGNIFICANT CHANGE UP (ref 32–36)
MCV RBC AUTO: 97.5 FL — SIGNIFICANT CHANGE UP (ref 80–100)
MCV RBC AUTO: 97.6 FL — SIGNIFICANT CHANGE UP (ref 80–100)
NRBC # BLD: 0 /100 WBCS — SIGNIFICANT CHANGE UP (ref 0–0)
NRBC # BLD: 0 /100 WBCS — SIGNIFICANT CHANGE UP (ref 0–0)
PHOSPHATE SERPL-MCNC: 3 MG/DL — SIGNIFICANT CHANGE UP (ref 2.5–4.5)
PLATELET # BLD AUTO: 113 K/UL — LOW (ref 150–400)
PLATELET # BLD AUTO: 130 K/UL — LOW (ref 150–400)
POTASSIUM SERPL-MCNC: 3.5 MMOL/L — SIGNIFICANT CHANGE UP (ref 3.5–5.3)
POTASSIUM SERPL-SCNC: 3.5 MMOL/L — SIGNIFICANT CHANGE UP (ref 3.5–5.3)
PROT SERPL-MCNC: 7.8 G/DL — SIGNIFICANT CHANGE UP (ref 6–8.3)
PROTHROM AB SERPL-ACNC: 12.2 SEC — SIGNIFICANT CHANGE UP (ref 10.6–13.6)
RBC # BLD: 2.85 M/UL — LOW (ref 3.8–5.2)
RBC # BLD: 2.85 M/UL — LOW (ref 3.8–5.2)
RBC # BLD: 2.9 M/UL — LOW (ref 3.8–5.2)
RBC # FLD: 19.9 % — HIGH (ref 10.3–14.5)
RBC # FLD: 20.4 % — HIGH (ref 10.3–14.5)
RETICS #: 19.7 K/UL — LOW (ref 25–125)
RETICS/RBC NFR: 0.7 % — SIGNIFICANT CHANGE UP (ref 0.5–2.5)
SODIUM SERPL-SCNC: 135 MMOL/L — SIGNIFICANT CHANGE UP (ref 135–145)
TIBC SERPL-MCNC: 109 UG/DL — LOW (ref 220–430)
UIBC SERPL-MCNC: 67 UG/DL — LOW (ref 110–370)
VIT B12 SERPL-MCNC: >2000 PG/ML — HIGH (ref 232–1245)
WBC # BLD: 1.59 K/UL — LOW (ref 3.8–10.5)
WBC # BLD: 2.21 K/UL — LOW (ref 3.8–10.5)
WBC # FLD AUTO: 1.59 K/UL — LOW (ref 3.8–10.5)
WBC # FLD AUTO: 2.21 K/UL — LOW (ref 3.8–10.5)

## 2021-04-11 PROCEDURE — 99233 SBSQ HOSP IP/OBS HIGH 50: CPT

## 2021-04-11 PROCEDURE — 99222 1ST HOSP IP/OBS MODERATE 55: CPT

## 2021-04-11 RX ORDER — HEPARIN SODIUM 5000 [USP'U]/ML
5000 INJECTION INTRAVENOUS; SUBCUTANEOUS EVERY 8 HOURS
Refills: 0 | Status: DISCONTINUED | OUTPATIENT
Start: 2021-04-11 | End: 2021-04-13

## 2021-04-11 RX ORDER — LIDOCAINE 4 G/100G
1 CREAM TOPICAL EVERY 24 HOURS
Refills: 0 | Status: DISCONTINUED | OUTPATIENT
Start: 2021-04-11 | End: 2021-04-13

## 2021-04-11 RX ORDER — GABAPENTIN 400 MG/1
100 CAPSULE ORAL
Refills: 0 | Status: DISCONTINUED | OUTPATIENT
Start: 2021-04-11 | End: 2021-04-13

## 2021-04-11 RX ADMIN — HEPARIN SODIUM 5000 UNIT(S): 5000 INJECTION INTRAVENOUS; SUBCUTANEOUS at 21:30

## 2021-04-11 RX ADMIN — GABAPENTIN 100 MILLIGRAM(S): 400 CAPSULE ORAL at 18:06

## 2021-04-11 RX ADMIN — HEPARIN SODIUM 5000 UNIT(S): 5000 INJECTION INTRAVENOUS; SUBCUTANEOUS at 13:41

## 2021-04-11 RX ADMIN — LIDOCAINE 1 PATCH: 4 CREAM TOPICAL at 11:09

## 2021-04-11 RX ADMIN — LIDOCAINE 1 PATCH: 4 CREAM TOPICAL at 20:32

## 2021-04-11 NOTE — PROGRESS NOTE ADULT - SUBJECTIVE AND OBJECTIVE BOX
Internal Medicine   Dalton Turcios | PGY-1    OVERNIGHT EVENTS: Patient admitted overnight for COVID infection and new onset pancytopenia in setting of recent monoclonal infusion therapy. Received one unit of blood last night, Hgb responded appropriately, stable this AM. Elevated indirect bilirubin. Completed dialysis session yesterday.    SUBJECTIVE: Patient was seen and examined at bedside this morning. Denies any nausea/vomiting/diarrhea, headache, shortness of breath or chest pain.       MEDICATIONS  (STANDING):  doxercalciferol Injectable 1.5 MICROGram(s) IV Push <User Schedule>  epoetin filiberto-epbx (RETACRIT) Injectable 74676 Unit(s) IV Push <User Schedule>    MEDICATIONS  (PRN):        T(F): 98.4 (04-11-21 @ 04:48), Max: 98.4 (04-10-21 @ 20:45)  HR: 79 (04-11-21 @ 04:48) (71 - 82)  BP: 109/72 (04-11-21 @ 04:48) (102/70 - 123/66)  BP(mean): --  RR: 19 (04-11-21 @ 04:48) (18 - 22)  SpO2: 98% (04-11-21 @ 04:48) (95% - 100%)    PHYSICAL EXAM:     GENERAL: NAD, lying in bed comfortably, receiving 1 unit pRBCs  HEAD:  Atraumatic, Normocephalic  EYES: EOMI, PERRLA, conjunctiva and sclera clear  ENT: Moist mucous membranes  NECK: Supple, No JVD  CHEST/LUNG: ; No rales, rhonchi, wheezing, or rubs. Unlabored respirations  HEART: Regular rate and rhythm; No murmurs, rubs, or gallops  ABDOMEN: normal bowel sounds; Soft, nontender, mildly distended, able to palpate edge of spleen further mid-axilla, nontender   EXTREMITIES:  2+ Peripheral Pulses, brisk capillary refill. No clubbing, cyanosis, or edema, left AV fistula with good thrill   Neurological:  A&Ox3, no focal deficits   SKIN: No rashes or lesions  PSYCH: normal affect and mood    TELEMETRY:    LABS:                        8.8    1.59  )-----------( 113      ( 11 Apr 2021 06:46 )             27.8     04-11    135  |  94<L>  |  19  ----------------------------<  86  3.5   |  26  |  4.14<H>    Ca    8.5      11 Apr 2021 06:41  Phos  3.0     04-11  Mg     2.1     04-11    TPro  7.8  /  Alb  4.1  /  TBili  2.1<H>  /  DBili  0.4<H>  /  AST  23  /  ALT  9<L>  /  AlkPhos  95  04-11        PT/INR - ( 11 Apr 2021 06:47 )   PT: 12.2 sec;   INR: 1.02 ratio         PTT - ( 11 Apr 2021 06:47 )  PTT:37.6 sec    Creatinine Trend: 4.14<--, 8.13<--  I&O's Summary    10 Apr 2021 07:01  -  11 Apr 2021 07:00  --------------------------------------------------------  IN: 500 mL / OUT: 1500 mL / NET: -1000 mL      BNPSerum Pro-Brain Natriuretic Peptide: 3723 pg/mL (04-10 @ 14:58)      RADIOLOGY & ADDITIONAL STUDIES:             Internal Medicine   Dalton Tam | PGY-1    OVERNIGHT EVENTS: Patient admitted overnight for COVID infection and new onset pancytopenia in setting of recent monoclonal infusion therapy. Received one unit of blood last night, Hgb responded appropriately, stable this AM. Elevated indirect bilirubin. Completed dialysis session yesterday.     SUBJECTIVE: Patient was seen and examined at bedside this morning. Denies any nausea/vomiting/diarrhea, headache, or difficulty breathing. Reports pleuritic chest pain worsened with deep inhalation. Endorses epigastrial pain with throat pain as well that is nontender to touch, likely secondary to GERD. States that she has had decreased appetite now for the past week, was able to eat a little breakfast this AM. States that yesterday during dialysis, she felt much better than she did during previous sessions this week where she reports chills and shaking, likely due to her low hemoglobin. Also endorsing pain in her right flank, near region where she had her bone biopsy completed approximately 3 months ago.      MEDICATIONS  (STANDING):  doxercalciferol Injectable 1.5 MICROGram(s) IV Push <User Schedule>  epoetin filiberto-epbx (RETACRIT) Injectable 95779 Unit(s) IV Push <User Schedule>    MEDICATIONS  (PRN):        T(F): 98.4 (04-11-21 @ 04:48), Max: 98.4 (04-10-21 @ 20:45)  HR: 79 (04-11-21 @ 04:48) (71 - 82)  BP: 109/72 (04-11-21 @ 04:48) (102/70 - 123/66)  BP(mean): --  RR: 19 (04-11-21 @ 04:48) (18 - 22)  SpO2: 98% (04-11-21 @ 04:48) (95% - 100%)    PHYSICAL EXAM:     GENERAL: NAD, lying in bed comfortably, receiving 1 unit pRBCs  HEAD:  Atraumatic, Normocephalic  EYES: EOMI, PERRLA, conjunctiva and sclera clear  ENT: Moist mucous membranes  NECK: Supple, No JVD  CHEST/LUNG: decreased  breath sounds, cough with deep inspiration occasionally, no rhonchi, wheezing, or rubs. Unlabored respirations  HEART: Regular rate and rhythm; No murmurs, rubs, or gallops  ABDOMEN: normal bowel sounds; Soft, nontender, mildly distended, able to palpate edge of spleen further mid-axilla, nontender   EXTREMITIES:  2+ Peripheral Pulses, brisk capillary refill. No clubbing, cyanosis, or edema, left AV fistula with good thrill   Neurological:  A&Ox3, no focal deficits   SKIN: No rashes or lesions  PSYCH: normal affect and mood    TELEMETRY:    LABS:                        8.8    1.59  )-----------( 113      ( 11 Apr 2021 06:46 )             27.8     04-11    135  |  94<L>  |  19  ----------------------------<  86  3.5   |  26  |  4.14<H>    Ca    8.5      11 Apr 2021 06:41  Phos  3.0     04-11  Mg     2.1     04-11    TPro  7.8  /  Alb  4.1  /  TBili  2.1<H>  /  DBili  0.4<H>  /  AST  23  /  ALT  9<L>  /  AlkPhos  95  04-11        PT/INR - ( 11 Apr 2021 06:47 )   PT: 12.2 sec;   INR: 1.02 ratio         PTT - ( 11 Apr 2021 06:47 )  PTT:37.6 sec    Creatinine Trend: 4.14<--, 8.13<--  I&O's Summary    10 Apr 2021 07:01  -  11 Apr 2021 07:00  --------------------------------------------------------  IN: 500 mL / OUT: 1500 mL / NET: -1000 mL      BNPSerum Pro-Brain Natriuretic Peptide: 3723 pg/mL (04-10 @ 14:58)      RADIOLOGY & ADDITIONAL STUDIES:             Internal Medicine   Dalton Tam | PGY-1    OVERNIGHT EVENTS: Patient admitted overnight for COVID infection and new onset pancytopenia in setting of recent monoclonal infusion therapy. Received one unit of blood last night, Hgb responded appropriately, stable this AM. Elevated indirect bilirubin. Completed dialysis session yesterday.     SUBJECTIVE: Patient was seen and examined at bedside this morning. Denies any nausea/vomiting/diarrhea, headache, or difficulty breathing. Reports pleuritic chest pain worsened with deep inhalation. Endorses epigastrial pain with throat pain as well that is nontender to touch, likely secondary to GERD. States that she has had decreased appetite now for the past week, was able to eat a little breakfast this AM. States that yesterday during dialysis, she felt much better than she did during previous sessions this week where she reports chills and shaking, likely due to her low hemoglobin. Also endorsing pain in her right flank, near region where she had her bone biopsy completed approximately 3 months ago.    12 point ros negative except for above    MEDICATIONS  (STANDING):  doxercalciferol Injectable 1.5 MICROGram(s) IV Push <User Schedule>  epoetin filiberto-epbx (RETACRIT) Injectable 71286 Unit(s) IV Push <User Schedule>    MEDICATIONS  (PRN):        T(F): 98.4 (04-11-21 @ 04:48), Max: 98.4 (04-10-21 @ 20:45)  HR: 79 (04-11-21 @ 04:48) (71 - 82)  BP: 109/72 (04-11-21 @ 04:48) (102/70 - 123/66)  BP(mean): --  RR: 19 (04-11-21 @ 04:48) (18 - 22)  SpO2: 98% (04-11-21 @ 04:48) (95% - 100%)    PHYSICAL EXAM:     GENERAL: NAD, lying in bed comfortably   HEAD:  Atraumatic, Normocephalic  EYES: EOMI, PERRLA, conjunctiva and sclera clear  ENT: Moist mucous membranes  NECK: Supple, No JVD  CHEST/LUNG: decreased  breath sounds, cough with deep inspiration occasionally, no rhonchi, wheezing, or rubs. Unlabored respirations  HEART: Regular rate and rhythm; No murmurs, rubs, or gallops  ABDOMEN: normal bowel sounds; Soft, nontender, mildly distended, able to palpate edge of spleen further mid-axilla, nontender   EXTREMITIES:  2+ Peripheral Pulses, brisk capillary refill. No clubbing, cyanosis, or edema, left AV fistula with good thrill   Neurological:  A&Ox3, no focal deficits   SKIN: No rashes or lesions  PSYCH: normal affect and mood    TELEMETRY:    LABS:                        8.8    1.59  )-----------( 113      ( 11 Apr 2021 06:46 )             27.8     04-11    135  |  94<L>  |  19  ----------------------------<  86  3.5   |  26  |  4.14<H>    Ca    8.5      11 Apr 2021 06:41  Phos  3.0     04-11  Mg     2.1     04-11    TPro  7.8  /  Alb  4.1  /  TBili  2.1<H>  /  DBili  0.4<H>  /  AST  23  /  ALT  9<L>  /  AlkPhos  95  04-11        PT/INR - ( 11 Apr 2021 06:47 )   PT: 12.2 sec;   INR: 1.02 ratio         PTT - ( 11 Apr 2021 06:47 )  PTT:37.6 sec    Creatinine Trend: 4.14<--, 8.13<--  I&O's Summary    10 Apr 2021 07:01  -  11 Apr 2021 07:00  --------------------------------------------------------  IN: 500 mL / OUT: 1500 mL / NET: -1000 mL      BNPSerum Pro-Brain Natriuretic Peptide: 3723 pg/mL (04-10 @ 14:58)      RADIOLOGY & ADDITIONAL STUDIES:

## 2021-04-11 NOTE — PROGRESS NOTE ADULT - PROBLEM SELECTOR PLAN 2
Leukopenia, thrombocytopenia and anemic on CBC on admission; compared to previous CBC few weeks ago  - s/p 1 unit pRBC , monitor Hgb/Hct and CBC, now stable this AM at 8.6  - currently on hydroxyurea 500 mg TIW after dialysis  - given history of JAK2+ polycythemia vera, could have possible auto-immune component as well as recent monoclonal antibody therapy   - per daughter, patients symptoms worsened after the treatment  - follow up iron deficiency work up Leukopenia, thrombocytopenia and anemic on CBC on admission; compared to previous CBC few weeks ago  - s/p 1 unit pRBC , monitor Hgb/Hct and CBC, now stable this AM at 8.6  - currently on hydroxyurea 500 mg TIW after dialysis  - given history of JAK2+ polycythemia vera, could have possible auto-immune component as well as recent monoclonal antibody therapy   - per daughter, patients symptoms worsened after the treatment  - B12/Folate within normal limits  - follow up iron deficiency work up ; ferritin elevated >1300 Leukopenia, thrombocytopenia and anemic on CBC on admission; compared to previous CBC few weeks ago  - s/p 1 unit pRBC , monitor Hgb/Hct and CBC, now stable this AM at 8.6  - hold hydroxyurea for now given possibility of myelosuppression.    - given history of JAK2+ polycythemia vera, could have possible auto-immune component as well as recent monoclonal antibody therapy   - per daughter, patients symptoms worsened after the treatment  - B12/Folate within normal limits  - follow up iron deficiency work up ; ferritin elevated >1300

## 2021-04-11 NOTE — PROGRESS NOTE ADULT - ASSESSMENT
58yo [place] F with ESRD on HD (T/Th/S), hx of splenomegaly and splenic vein thrombosis (2015) 2/2 to polycythemia vera, HTN, pancreatic cysts, gastric varices 2/2 portal hypertension presents to the hospital with COVID+ , worsening dyspnea on exertion, recent COVID monoclonal antibody treatment.

## 2021-04-11 NOTE — DISCHARGE NOTE PROVIDER - NSDCFUSCHEDAPPT_GEN_ALL_CORE_FT
[facility] ; 04/27/2021 ; [facility] [address] [facility] ; 05/04/2021 ; [facility] [address] [facility] ; 05/04/2021 ; [facility] [address]

## 2021-04-11 NOTE — DISCHARGE NOTE PROVIDER - NSDCCPCAREPLAN_GEN_ALL_CORE_FT
PRINCIPAL DISCHARGE DIAGNOSIS  Diagnosis: Symptomatic anemia  Assessment and Plan of Treatment: You were admitted to the hospital due shortness of breath, lethargy, weakness and chills that started concomittantly with your new COVID infection. You were found to have a low hemoglobin, low white blood cell count and platelet count , this may have been due to your recent monoclonal transfusion. Given your history of polycythemia vera, you may be at greater risk of thrombosis, clots and other blood complications. Your hydroxyurea was held while you were here. Please follow up with your hematologist Dr. Guo. Please return to the hospital if you have worsening shortness of breath, weaknes, lethargy, changes in mental status.      SECONDARY DISCHARGE DIAGNOSES  Diagnosis: COVID-19  Assessment and Plan of Treatment: You were admitted to the hospital due COVID 19 symptoms; you tested positive previously on the 4th and had symptoms consistent with COVID pneumonia. You received a monoclonal antibody therapy on 4/8 and did not have improvement in symptoms. You also had recent exposure to COVID at home. Please return to the hospital if you have worsening fevers, shortness of breath, wheezing/chest pain. Please maintain proper quarantine measures.     PRINCIPAL DISCHARGE DIAGNOSIS  Diagnosis: Symptomatic anemia  Assessment and Plan of Treatment: You were admitted to the hospital due shortness of breath, lethargy, weakness and chills that started concomittantly with your new COVID infection. You were found to have a low hemoglobin, low white blood cell count and platelet count , this may have been due to your recent monoclonal transfusion. Given your history of polycythemia vera, you may be at greater risk of thrombosis, clots and other blood complications. Your hydroxyurea was held while you were here. Please follow up with your hematologist Dr. Guo in 2 weeks to discuss if you should restart hydrxyurea. Please return to the hospital if you have worsening shortness of breath, weaknes, lethargy, changes in mental status.      SECONDARY DISCHARGE DIAGNOSES  Diagnosis: COVID-19  Assessment and Plan of Treatment: You were admitted to the hospital due COVID 19 symptoms; you tested positive previously on the 4th and had symptoms consistent with COVID pneumonia. You also had recent exposure to COVID at home. Please maintain proper quarantine measures for 10 days.  Please return to the hospital if you have worsening fevers, shortness of breath, wheezing/chest pain.     PRINCIPAL DISCHARGE DIAGNOSIS  Diagnosis: Pancytopenia  Assessment and Plan of Treatment: You were admitted to the hospital due shortness of breath, lethargy, weakness and chills that started concomittantly with your new COVID infection. You were found to have a low hemoglobin, low white blood cell count and platelet count , this may have been due to side effect from hydroxyurea while having acute infection, or the recent monoclonal transfusion. You received red blood cell transfusion, and your blood counts improved overall. Your hydroxyurea was held while you were here. Please follow up with your hematologist Dr. Guo in 2 weeks to discuss if you should restart hydroxyurea. Please return to the hospital if you have worsening shortness of breath, weaknes, lethargy, changes in mental status.      SECONDARY DISCHARGE DIAGNOSES  Diagnosis: COVID-19  Assessment and Plan of Treatment: You were admitted to the hospital with COVID 19 symptoms; you tested positive previously on the 4th and had symptoms consistent with COVID pneumonia. Please maintain proper quarantine measures for 10 days. Please return to the hospital if you have worsening fevers, shortness of breath, wheezing/chest pain.  Given your history of polycythemia vera, you may be at greater risk of forming clots and other blood complications with COVID-19 infection. Please take aspirin for 1 month to minimize risk of bood clots, and follow up with  your PCP. If you experience sudden onset of leg swelling, chest pain or difficulty breathing, return to hospital.    Diagnosis: ESRD on hemodialysis  Assessment and Plan of Treatment: You received dialysis while in the hospital. Please follow up at the new dialysis center as scheduled as you cannot return to your regular center at this time due to COVID-19 infection.    Diagnosis: Pancytopenia  Assessment and Plan of Treatment: You were admitted to the hospital due shortness of breath, lethargy, weakness and chills that started concomittantly with your new COVID infection. You were found to have a low hemoglobin, low white blood cell count and platelet count , this may have been due to side effect from hydroxyurea while having acute infection, or the recent monoclonal transfusion. You received red blood cell transfusion, and your blood counts improved overall. Your hydroxyurea was held while you were here. Please follow up with your hematologist Dr. Guo in 2 weeks to discuss if you should restart hydroxyurea. Please return to the hospital if you have worsening shortness of breath, weaknes, lethargy, changes in mental status.

## 2021-04-11 NOTE — DISCHARGE NOTE PROVIDER - NSDCMRMEDTOKEN_GEN_ALL_CORE_FT
calcium acetate 667 mg oral capsule: 2 tab(s) orally 3 times a day  epoetin filiberto: 42156 unit(s) intravenously intradialysis  hydroxyurea 500 mg oral capsule: 1 cap(s) orally 3 times a week  pantoprazole 40 mg oral delayed release tablet: 1 tab(s) orally once a day   Aspirin Enteric Coated 81 mg oral delayed release tablet: 1 tab(s) orally once a day   calcium acetate 667 mg oral capsule: 2 tab(s) orally 3 times a day  gabapentin 100 mg oral capsule: 1 cap(s) orally 2 times a day  pantoprazole 40 mg oral delayed release tablet: 1 tab(s) orally once a day   Aspirin Enteric Coated 81 mg oral delayed release tablet: 1 tab(s) orally once a day   calcium acetate 667 mg oral capsule: 2 tab(s) orally 3 times a day  gabapentin 100 mg oral capsule: 1 cap(s) orally 2 times a day  pantoprazole 40 mg oral delayed release tablet: 1 tab(s) orally once a day  propranolol 10 mg oral tablet: 1 tab(s) orally once a day

## 2021-04-11 NOTE — PROGRESS NOTE ADULT - PROBLEM SELECTOR PLAN 6
DVT: hold chemical prophylaxis   Diet: Renal Diet  Dispo: DVT: CW chemical prophylaxis   Diet: Renal Diet

## 2021-04-11 NOTE — DISCHARGE NOTE PROVIDER - CARE PROVIDER_API CALL
Jacky Guo)  Hematology; Internal Medicine; Oncology  [address] [place] [address] [id number]  Phone: [phone]  Fax: [phone]  Established Patient  Follow Up Time: 2 weeks

## 2021-04-11 NOTE — DISCHARGE NOTE PROVIDER - HOSPITAL COURSE
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 Tested positive on COVID 4/4 after she start having symptoms the day before; reports fevers/chills, body aches, cough, nausea, pleuritic chest pain with associated cough, worse with inspiration. Had monoclonal infusion 2 days ago for COVID therapy, since then her symptoms have been worsening. Never had COVID vaccination completed. Today she reports that her symptoms have been worsening with associated dizziness/lightheadedness and difficulty ambulating. Reports nausea with retching, but no vomiting. States that multiple family members was recently diagnosed with COVID with mild symptoms. Did not have dialysis session today.      Of note patient was recently in hospital in January for abdominal pain, found to have increased splenomegaly with upper abdominal and splenic varices. 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 EGD did show evidence of isolated gastric varices with no active hemorrhage and started on prophylactic propanolol on discharge.     In the ED, patient was aefbrile, hemodynamically stable. CXR significant with patchy bilateral lower lung opacities compatible with multifocal infection. Found to be pancytopenic WBC 1.75, Hgb 6.8, Pls 106. Creatinine elevated to 8.13. Received 1 unit pRBC in the ED.     Hospital Course: Patient admitted to medicine for evaluation of pancytopenia in setting of COVID infection and recent monoclonal antibody treatment. Hgb responded appropriately, received 1 unit pRBC total. Saturating well on RA, no remdesivir/dex. Mildy elevated indirect bilirubin , with possible hemolytic anemia. Given patient's thrombocytopenia, did not start on full anticoagulation despite history of  spelnic infarcts - currently no active issues regarding her PCV or evidence of ischemia. We think that this is may all be secondary to that monoclonal transfusion 2 days prior to admission causing the pancytopenia. Patient is now hemodynamically stable and medically optimized for discharge home with follow up with Dr. Jacky Guo, hematologist.    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 Tested positive on COVID 4/4 after she start having symptoms the day before; reports fevers/chills, body aches, cough, nausea, pleuritic chest pain with associated cough, worse with inspiration. Had monoclonal infusion 2 days ago for COVID therapy, since then her symptoms have been worsening. Never had COVID vaccination completed. Today she reports that her symptoms have been worsening with associated dizziness/lightheadedness and difficulty ambulating. Reports nausea with retching, but no vomiting. States that multiple family members was recently diagnosed with COVID with mild symptoms. Did not have dialysis session today.      Of note patient was recently in hospital in January for abdominal pain, found to have increased splenomegaly with upper abdominal and splenic varices. 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 EGD did show evidence of isolated gastric varices with no active hemorrhage and started on prophylactic propanolol on discharge.     In the ED, patient was aefbrile, hemodynamically stable. CXR significant with patchy bilateral lower lung opacities compatible with multifocal infection. Found to be pancytopenic WBC 1.75, Hgb 6.8, Pls 106. Creatinine elevated to 8.13. Received 1 unit pRBC in the ED.     Hospital Course: Patient admitted to medicine for evaluation of pancytopenia in setting of COVID infection and recent monoclonal antibody treatment. Hgb responded appropriately, received 1 unit pRBC total. Saturating well on RA, no remdesivir/dex. Mildy elevated indirect bilirubin , with possible hemolytic anemia. Given patient's thrombocytopenia, did not start on full anticoagulation despite history of  spelnic infarcts - currently no active issues regarding her PCV or evidence of ischemia. We think that this is may all be secondary to that monoclonal transfusion 2 days prior to admission causing the pancytopenia. Patient is now hemodynamically stable and medically optimized for discharge home with follow up with Dr. Jacky Guo, hematologist. On discharge date, pt's vitals were stable, pt's cbc was d/w hematologist who will follow up with hematologist in 2 weeks. Vital sign stable on admission, not requiring supplemental oxygen.    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 Tested positive on COVID 4/4 after she start having symptoms the day before; reports fevers/chills, body aches, cough, nausea, pleuritic chest pain with associated cough, worse with inspiration. Had monoclonal infusion 2 days ago for COVID therapy, since then her symptoms have been worsening. Never had COVID vaccination completed. Today she reports that her symptoms have been worsening with associated dizziness/lightheadedness and difficulty ambulating. Reports nausea with retching, but no vomiting. States that multiple family members was recently diagnosed with COVID with mild symptoms. Did not have dialysis session today.      Of note patient was recently in hospital in January for abdominal pain, found to have increased splenomegaly with upper abdominal and splenic varices. 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 EGD did show evidence of isolated gastric varices with no active hemorrhage and started on prophylactic propanolol on discharge.     In the ED, patient was aefbrile, hemodynamically stable. CXR significant with patchy bilateral lower lung opacities compatible with multifocal infection. Found to be pancytopenic WBC 1.75, Hgb 6.8, Pls 106. Creatinine elevated to 8.13. Received 1 unit pRBC in the ED.     Hospital Course: Patient admitted to medicine for evaluation of pancytopenia in setting of COVID infection and recent monoclonal antibody treatment. Hgb responded appropriately, received 1 unit pRBC total. Saturating well on RA, no remdesivir/dex. Mildy elevated indirect bilirubin , no hemolytic anemia. Given patient's thrombocytopenia, did not start on full anticoagulation despite history of  spelnic infarcts - currently no active issues regarding her PCV or evidence of ischemia. Home hydroxyurea was held given potential side effect to cause pancytopenia in a patient with acute illness. Cell lines improved. Patient is now hemodynamically stable, no O2 requirement, and medically optimized for discharge home with follow up with Dr. Jacky Guo, hematologist. On discharge date, pt's vitals were stable, pt's cbc was d/w hematologist who will follow up in 2 weeks.

## 2021-04-11 NOTE — DISCHARGE NOTE PROVIDER - NSDCCAREPROVSEEN_GEN_ALL_CORE_FT
You, Mick Lyons Tam, Mick Lyons  [facility]  [facility] team 4

## 2021-04-11 NOTE — PROGRESS NOTE ADULT - PROBLEM SELECTOR PLAN 3
Hemodialysis on T/Th/Sat, AV fistula 3 years ago  - continue dialysis inpatient, completed last night 4/10  - nephrology consulted

## 2021-04-12 ENCOUNTER — NON-APPOINTMENT (OUTPATIENT)
Age: 61
End: 2021-04-12

## 2021-04-12 LAB
ALBUMIN SERPL ELPH-MCNC: 4.2 G/DL — SIGNIFICANT CHANGE UP (ref 3.3–5)
ALP SERPL-CCNC: 106 U/L — SIGNIFICANT CHANGE UP (ref 40–120)
ALT FLD-CCNC: 7 U/L — LOW (ref 10–45)
ANION GAP SERPL CALC-SCNC: 19 MMOL/L — HIGH (ref 5–17)
ANISOCYTOSIS BLD QL: SLIGHT — SIGNIFICANT CHANGE UP
AST SERPL-CCNC: 20 U/L — SIGNIFICANT CHANGE UP (ref 10–40)
BASOPHILS # BLD AUTO: 0.03 K/UL — SIGNIFICANT CHANGE UP (ref 0–0.2)
BASOPHILS NFR BLD AUTO: 0.9 % — SIGNIFICANT CHANGE UP (ref 0–2)
BILIRUB SERPL-MCNC: 2.6 MG/DL — HIGH (ref 0.2–1.2)
BUN SERPL-MCNC: 43 MG/DL — HIGH (ref 7–23)
CALCIUM SERPL-MCNC: 8.6 MG/DL — SIGNIFICANT CHANGE UP (ref 8.4–10.5)
CHLORIDE SERPL-SCNC: 91 MMOL/L — LOW (ref 96–108)
CO2 SERPL-SCNC: 24 MMOL/L — SIGNIFICANT CHANGE UP (ref 22–31)
CREAT SERPL-MCNC: 6.4 MG/DL — HIGH (ref 0.5–1.3)
CRP SERPL-MCNC: 32 MG/L — HIGH (ref 0–4)
DACRYOCYTES BLD QL SMEAR: SLIGHT — SIGNIFICANT CHANGE UP
ELLIPTOCYTES BLD QL SMEAR: SLIGHT — SIGNIFICANT CHANGE UP
EOSINOPHIL # BLD AUTO: 0.1 K/UL — SIGNIFICANT CHANGE UP (ref 0–0.5)
EOSINOPHIL NFR BLD AUTO: 2.7 % — SIGNIFICANT CHANGE UP (ref 0–6)
FERRITIN SERPL-MCNC: 1564 NG/ML — HIGH (ref 15–150)
GLUCOSE SERPL-MCNC: 126 MG/DL — HIGH (ref 70–99)
HAPTOGLOB SERPL-MCNC: 103 MG/DL — SIGNIFICANT CHANGE UP (ref 34–200)
HCT VFR BLD CALC: 31 % — LOW (ref 34.5–45)
HGB BLD-MCNC: 9.9 G/DL — LOW (ref 11.5–15.5)
LYMPHOCYTES # BLD AUTO: 0.85 K/UL — LOW (ref 1–3.3)
LYMPHOCYTES # BLD AUTO: 23.4 % — SIGNIFICANT CHANGE UP (ref 13–44)
MAGNESIUM SERPL-MCNC: 2.2 MG/DL — SIGNIFICANT CHANGE UP (ref 1.6–2.6)
MANUAL SMEAR VERIFICATION: SIGNIFICANT CHANGE UP
MCHC RBC-ENTMCNC: 30.9 PG — SIGNIFICANT CHANGE UP (ref 27–34)
MCHC RBC-ENTMCNC: 31.9 GM/DL — LOW (ref 32–36)
MCV RBC AUTO: 96.9 FL — SIGNIFICANT CHANGE UP (ref 80–100)
METAMYELOCYTES # FLD: 0.9 % — HIGH (ref 0–0)
MONOCYTES # BLD AUTO: 0.13 K/UL — SIGNIFICANT CHANGE UP (ref 0–0.9)
MONOCYTES NFR BLD AUTO: 3.6 % — SIGNIFICANT CHANGE UP (ref 2–14)
MYELOCYTES NFR BLD: 1.8 % — HIGH (ref 0–0)
NEUTROPHILS # BLD AUTO: 2.41 K/UL — SIGNIFICANT CHANGE UP (ref 1.8–7.4)
NEUTROPHILS NFR BLD AUTO: 66.7 % — SIGNIFICANT CHANGE UP (ref 43–77)
NRBC # BLD: 2 /100 — HIGH (ref 0–0)
OVALOCYTES BLD QL SMEAR: SLIGHT — SIGNIFICANT CHANGE UP
PHOSPHATE SERPL-MCNC: 4.3 MG/DL — SIGNIFICANT CHANGE UP (ref 2.5–4.5)
PLAT MORPH BLD: NORMAL — SIGNIFICANT CHANGE UP
PLATELET # BLD AUTO: 181 K/UL — SIGNIFICANT CHANGE UP (ref 150–400)
POIKILOCYTOSIS BLD QL AUTO: SIGNIFICANT CHANGE UP
POLYCHROMASIA BLD QL SMEAR: SLIGHT — SIGNIFICANT CHANGE UP
POTASSIUM SERPL-MCNC: 4.3 MMOL/L — SIGNIFICANT CHANGE UP (ref 3.5–5.3)
POTASSIUM SERPL-SCNC: 4.3 MMOL/L — SIGNIFICANT CHANGE UP (ref 3.5–5.3)
PROT SERPL-MCNC: 8.5 G/DL — HIGH (ref 6–8.3)
RBC # BLD: 3.2 M/UL — LOW (ref 3.8–5.2)
RBC # BLD: 3.2 M/UL — LOW (ref 3.8–5.2)
RBC # FLD: 19.6 % — HIGH (ref 10.3–14.5)
RBC BLD AUTO: ABNORMAL
RETICS #: 25.6 K/UL — SIGNIFICANT CHANGE UP (ref 25–125)
RETICS/RBC NFR: 0.8 % — SIGNIFICANT CHANGE UP (ref 0.5–2.5)
SODIUM SERPL-SCNC: 134 MMOL/L — LOW (ref 135–145)
WBC # BLD: 3.62 K/UL — LOW (ref 3.8–10.5)
WBC # FLD AUTO: 3.62 K/UL — LOW (ref 3.8–10.5)

## 2021-04-12 PROCEDURE — 99232 SBSQ HOSP IP/OBS MODERATE 35: CPT

## 2021-04-12 RX ADMIN — GABAPENTIN 100 MILLIGRAM(S): 400 CAPSULE ORAL at 05:07

## 2021-04-12 RX ADMIN — HEPARIN SODIUM 5000 UNIT(S): 5000 INJECTION INTRAVENOUS; SUBCUTANEOUS at 05:06

## 2021-04-12 RX ADMIN — HEPARIN SODIUM 5000 UNIT(S): 5000 INJECTION INTRAVENOUS; SUBCUTANEOUS at 21:02

## 2021-04-12 RX ADMIN — GABAPENTIN 100 MILLIGRAM(S): 400 CAPSULE ORAL at 17:03

## 2021-04-12 RX ADMIN — LIDOCAINE 1 PATCH: 4 CREAM TOPICAL at 01:01

## 2021-04-12 NOTE — PROGRESS NOTE ADULT - ASSESSMENT
60yo Hungarian F with ESRD on HD (T/Th/S), hx of splenomegaly and splenic vein thrombosis (2015) 2/2 to polycythemia vera, HTN, pancreatic cysts, gastric varices 2/2 portal hypertension presents to the hospital with COVID+ , worsening dyspnea on exertion, recent COVID monoclonal antibody treatment.

## 2021-04-12 NOTE — PROGRESS NOTE ADULT - PROBLEM SELECTOR PLAN 1
Tested positive for COVID-19 on 4/4, symptomatic with shortness of breath, lethargy, weakness, worsening after monoclonal antibody therapy  - symptomatic for the past 5 days, has had sick contacts, multiple family members positive   - COVID PCR positive, CXR consistent with COVID pneumonia  - procalcitonin elevated to 20, likely also increased in setting of ESRD   - currently saturating well on RA  - started on Xarelto 10 mg PO daily x 2 weeks for thombophylaxis by hematologist; will not start due to kidney function  - hold chemical prophylaxis at this time until platelets improve   - no evidence of fevers ; no indications for remdesivir/dexamethasone at this time

## 2021-04-12 NOTE — PROGRESS NOTE ADULT - PROBLEM SELECTOR PLAN 3
Hemodialysis on T/Th/Sat, AV fistula 3 years ago  - continue dialysis inpatient, completed last night 4/10  - nephrology consulted Hemodialysis on T/Th/Sat, AV fistula 3 years ago  - continue dialysis inpatient, completed last night 4/10  - nephrology consulted, plan for Hd tomorrow in AM

## 2021-04-12 NOTE — PROGRESS NOTE ADULT - SUBJECTIVE AND OBJECTIVE BOX
PROGRESS NOTE:     CONTACT INFO:  Chapo Baltazar  PGY-1 Internal Medicine  [phone]    Patient is a 60y old  Female who presents with a chief complaint of COVID (12 Apr 2021 07:10)      langage line : arlette lim [id number]  SUBJECTIVE / OVERNIGHT EVENTS:   Patient seen and evaluated at bedside. No fever/chills.  Denies SOB at rest, chest pain, palpitations, abdominal pain, nausea/vomiting. pt is complaining today of pain in right costavertebral region/bone, site of bone bx 3 months ago.       MEDICATIONS  (STANDING):  doxercalciferol Injectable 1.5 MICROGram(s) IV Push <User Schedule>  epoetin filiberto-epbx (RETACRIT) Injectable 35670 Unit(s) IV Push <User Schedule>  gabapentin 100 milliGRAM(s) Oral two times a day  heparin   Injectable 5000 Unit(s) SubCutaneous every 8 hours  lidocaine   Patch 1 Patch Transdermal every 24 hours  propranolol 10 milliGRAM(s) Oral two times a day    MEDICATIONS  (PRN):      CAPILLARY BLOOD GLUCOSE        I&O's Summary    11 Apr 2021 07:01  -  12 Apr 2021 07:00  --------------------------------------------------------  IN: 420 mL / OUT: 100 mL / NET: 320 mL        PHYSICAL EXAM:  Vital Signs Last 24 Hrs  T(C): 36.8 (12 Apr 2021 07:58), Max: 37.1 (11 Apr 2021 20:06)  T(F): 98.2 (12 Apr 2021 07:58), Max: 98.7 (11 Apr 2021 20:06)  HR: 70 (12 Apr 2021 07:58) (70 - 85)  BP: 129/77 (12 Apr 2021 07:58) (109/70 - 129/77)  BP(mean): --  RR: 18 (12 Apr 2021 07:58) (18 - 19)  SpO2: 97% (12 Apr 2021 07:58) (97% - 98%)    CONSTITUTIONAL: NAD, well-developed  RESPIRATORY: Normal respiratory effort; lungs are clear to auscultation bilaterally  CARDIOVASCULAR: Regular rate and rhythm, normal S1 and S2, no murmur/rub/gallop; No lower extremity edema; Peripheral pulses are 2+ bilaterally  ABDOMEN: Nontender to palpation, normoactive bowel sounds, no rebound/guarding; No hepatosplenomegaly  MUSCLOSKELETAL: no clubbing or cyanosis of digits; no joint swelling or tenderness to palpation  PSYCH: A+O to person, place, and time; affect appropriate    LABS:                        9.1    2.21  )-----------( 130      ( 11 Apr 2021 14:32 )             28.3     04-11    135  |  94<L>  |  19  ----------------------------<  86  3.5   |  26  |  4.14<H>    Ca    8.5      11 Apr 2021 06:41  Phos  3.0     04-11  Mg     2.1     04-11    TPro  7.8  /  Alb  4.1  /  TBili  2.1<H>  /  DBili  0.4<H>  /  AST  23  /  ALT  9<L>  /  AlkPhos  95  04-11    PT/INR - ( 11 Apr 2021 06:47 )   PT: 12.2 sec;   INR: 1.02 ratio         PTT - ( 11 Apr 2021 06:47 )  PTT:37.6 sec            RADIOLOGY & ADDITIONAL TESTS:  Results Reviewed:   Imaging Personally Reviewed:  Electrocardiogram Personally Reviewed:    COORDINATION OF CARE:  Care Discussed with Consultants/Other Providers [Y/N]:  Prior or Outpatient Records Reviewed [Y/N]:

## 2021-04-12 NOTE — PROGRESS NOTE ADULT - PROBLEM SELECTOR PLAN 2
Leukopenia, thrombocytopenia and anemic on CBC on admission; compared to previous CBC few weeks ago  - s/p 1 unit pRBC , monitor Hgb/Hct and CBC, now stable this AM at 8.6  - hold hydroxyurea for now given possibility of myelosuppression.    - given history of JAK2+ polycythemia vera, could have possible auto-immune component as well as recent monoclonal antibody therapy   - per daughter, patients symptoms worsened after the treatment  - B12/Folate within normal limits  - follow up iron deficiency work up ; ferritin elevated >1300 Leukopenia, thrombocytopenia and anemic on CBC on admission; compared to previous CBC few weeks ago  - s/p 1 unit pRBC , monitor Hgb/Hct and CBC, now stable this AM at 8.6  - hold hydroxyurea for now given possibility of myelosuppression.    - given history of JAK2+ polycythemia vera, could have possible auto-immune component as well as recent monoclonal antibody therapy   - per daughter, patients symptoms worsened after the treatment  - B12/Folate within normal limits  - follow up iron deficiency work up ; ferritin elevated >1300  will d/w heme about possible d/c as pancytopenia imporving.

## 2021-04-13 ENCOUNTER — TRANSCRIPTION ENCOUNTER (OUTPATIENT)
Age: 61
End: 2021-04-13

## 2021-04-13 VITALS
SYSTOLIC BLOOD PRESSURE: 102 MMHG | HEART RATE: 85 BPM | TEMPERATURE: 98 F | OXYGEN SATURATION: 97 % | DIASTOLIC BLOOD PRESSURE: 67 MMHG | RESPIRATION RATE: 18 BRPM

## 2021-04-13 LAB
ALBUMIN SERPL ELPH-MCNC: 3.7 G/DL — SIGNIFICANT CHANGE UP (ref 3.3–5)
ALP SERPL-CCNC: 93 U/L — SIGNIFICANT CHANGE UP (ref 40–120)
ALT FLD-CCNC: 8 U/L — LOW (ref 10–45)
ANION GAP SERPL CALC-SCNC: 17 MMOL/L — SIGNIFICANT CHANGE UP (ref 5–17)
AST SERPL-CCNC: 15 U/L — SIGNIFICANT CHANGE UP (ref 10–40)
BASOPHILS # BLD AUTO: 0.01 K/UL — SIGNIFICANT CHANGE UP (ref 0–0.2)
BASOPHILS NFR BLD AUTO: 0.3 % — SIGNIFICANT CHANGE UP (ref 0–2)
BILIRUB SERPL-MCNC: 2.6 MG/DL — HIGH (ref 0.2–1.2)
BUN SERPL-MCNC: 47 MG/DL — HIGH (ref 7–23)
CALCIUM SERPL-MCNC: 8.2 MG/DL — LOW (ref 8.4–10.5)
CHLORIDE SERPL-SCNC: 99 MMOL/L — SIGNIFICANT CHANGE UP (ref 96–108)
CO2 SERPL-SCNC: 20 MMOL/L — LOW (ref 22–31)
COVID-19 SPIKE DOMAIN AB INTERP: POSITIVE
COVID-19 SPIKE DOMAIN ANTIBODY RESULT: 105 U/ML — HIGH
CREAT SERPL-MCNC: 7.53 MG/DL — HIGH (ref 0.5–1.3)
CULTURE RESULTS: SIGNIFICANT CHANGE UP
EOSINOPHIL # BLD AUTO: 0.14 K/UL — SIGNIFICANT CHANGE UP (ref 0–0.5)
EOSINOPHIL NFR BLD AUTO: 4.3 % — SIGNIFICANT CHANGE UP (ref 0–6)
FERRITIN SERPL-MCNC: 1198 NG/ML — HIGH (ref 15–150)
GLUCOSE SERPL-MCNC: 95 MG/DL — SIGNIFICANT CHANGE UP (ref 70–99)
HAPTOGLOB SERPL-MCNC: 73 MG/DL — SIGNIFICANT CHANGE UP (ref 34–200)
HCT VFR BLD CALC: 29 % — LOW (ref 34.5–45)
HGB BLD-MCNC: 9 G/DL — LOW (ref 11.5–15.5)
IMM GRANULOCYTES NFR BLD AUTO: 1.2 % — SIGNIFICANT CHANGE UP (ref 0–1.5)
LYMPHOCYTES # BLD AUTO: 0.77 K/UL — LOW (ref 1–3.3)
LYMPHOCYTES # BLD AUTO: 23.9 % — SIGNIFICANT CHANGE UP (ref 13–44)
MAGNESIUM SERPL-MCNC: 2.2 MG/DL — SIGNIFICANT CHANGE UP (ref 1.6–2.6)
MCHC RBC-ENTMCNC: 30.7 PG — SIGNIFICANT CHANGE UP (ref 27–34)
MCHC RBC-ENTMCNC: 31 GM/DL — LOW (ref 32–36)
MCV RBC AUTO: 99 FL — SIGNIFICANT CHANGE UP (ref 80–100)
MONOCYTES # BLD AUTO: 0.18 K/UL — SIGNIFICANT CHANGE UP (ref 0–0.9)
MONOCYTES NFR BLD AUTO: 5.6 % — SIGNIFICANT CHANGE UP (ref 2–14)
NEUTROPHILS # BLD AUTO: 2.08 K/UL — SIGNIFICANT CHANGE UP (ref 1.8–7.4)
NEUTROPHILS NFR BLD AUTO: 64.7 % — SIGNIFICANT CHANGE UP (ref 43–77)
NRBC # BLD: 1 /100 WBCS — HIGH (ref 0–0)
PHOSPHATE SERPL-MCNC: 4 MG/DL — SIGNIFICANT CHANGE UP (ref 2.5–4.5)
PLATELET # BLD AUTO: 149 K/UL — LOW (ref 150–400)
POTASSIUM SERPL-MCNC: 4 MMOL/L — SIGNIFICANT CHANGE UP (ref 3.5–5.3)
POTASSIUM SERPL-SCNC: 4 MMOL/L — SIGNIFICANT CHANGE UP (ref 3.5–5.3)
PROT SERPL-MCNC: 7.3 G/DL — SIGNIFICANT CHANGE UP (ref 6–8.3)
RBC # BLD: 2.93 M/UL — LOW (ref 3.8–5.2)
RBC # BLD: 2.93 M/UL — LOW (ref 3.8–5.2)
RBC # FLD: 19.5 % — HIGH (ref 10.3–14.5)
RETICS #: 24 K/UL — LOW (ref 25–125)
RETICS/RBC NFR: 0.8 % — SIGNIFICANT CHANGE UP (ref 0.5–2.5)
SARS-COV-2 IGG+IGM SERPL QL IA: 105 U/ML — HIGH
SARS-COV-2 IGG+IGM SERPL QL IA: POSITIVE
SODIUM SERPL-SCNC: 136 MMOL/L — SIGNIFICANT CHANGE UP (ref 135–145)
SPECIMEN SOURCE: SIGNIFICANT CHANGE UP
WBC # BLD: 3.22 K/UL — LOW (ref 3.8–10.5)
WBC # FLD AUTO: 3.22 K/UL — LOW (ref 3.8–10.5)

## 2021-04-13 PROCEDURE — 99233 SBSQ HOSP IP/OBS HIGH 50: CPT

## 2021-04-13 PROCEDURE — P9016: CPT

## 2021-04-13 PROCEDURE — 80053 COMPREHEN METABOLIC PANEL: CPT

## 2021-04-13 PROCEDURE — 83010 ASSAY OF HAPTOGLOBIN QUANT: CPT

## 2021-04-13 PROCEDURE — 86706 HEP B SURFACE ANTIBODY: CPT

## 2021-04-13 PROCEDURE — 84100 ASSAY OF PHOSPHORUS: CPT

## 2021-04-13 PROCEDURE — 85379 FIBRIN DEGRADATION QUANT: CPT

## 2021-04-13 PROCEDURE — 83550 IRON BINDING TEST: CPT

## 2021-04-13 PROCEDURE — 84145 PROCALCITONIN (PCT): CPT

## 2021-04-13 PROCEDURE — 86803 HEPATITIS C AB TEST: CPT

## 2021-04-13 PROCEDURE — 99285 EMERGENCY DEPT VISIT HI MDM: CPT | Mod: 25

## 2021-04-13 PROCEDURE — 71045 X-RAY EXAM CHEST 1 VIEW: CPT

## 2021-04-13 PROCEDURE — 85045 AUTOMATED RETICULOCYTE COUNT: CPT

## 2021-04-13 PROCEDURE — 86140 C-REACTIVE PROTEIN: CPT

## 2021-04-13 PROCEDURE — 83615 LACTATE (LD) (LDH) ENZYME: CPT

## 2021-04-13 PROCEDURE — 82247 BILIRUBIN TOTAL: CPT

## 2021-04-13 PROCEDURE — 86880 COOMBS TEST DIRECT: CPT

## 2021-04-13 PROCEDURE — 82607 VITAMIN B-12: CPT

## 2021-04-13 PROCEDURE — 87340 HEPATITIS B SURFACE AG IA: CPT

## 2021-04-13 PROCEDURE — 86850 RBC ANTIBODY SCREEN: CPT

## 2021-04-13 PROCEDURE — 85730 THROMBOPLASTIN TIME PARTIAL: CPT

## 2021-04-13 PROCEDURE — 86900 BLOOD TYPING SEROLOGIC ABO: CPT

## 2021-04-13 PROCEDURE — 83735 ASSAY OF MAGNESIUM: CPT

## 2021-04-13 PROCEDURE — 85025 COMPLETE CBC W/AUTO DIFF WBC: CPT

## 2021-04-13 PROCEDURE — 36430 TRANSFUSION BLD/BLD COMPNT: CPT

## 2021-04-13 PROCEDURE — 86704 HEP B CORE ANTIBODY TOTAL: CPT

## 2021-04-13 PROCEDURE — 82746 ASSAY OF FOLIC ACID SERUM: CPT

## 2021-04-13 PROCEDURE — 84484 ASSAY OF TROPONIN QUANT: CPT

## 2021-04-13 PROCEDURE — 99261: CPT

## 2021-04-13 PROCEDURE — 99239 HOSP IP/OBS DSCHRG MGMT >30: CPT | Mod: GC

## 2021-04-13 PROCEDURE — 87389 HIV-1 AG W/HIV-1&-2 AB AG IA: CPT

## 2021-04-13 PROCEDURE — U0005: CPT

## 2021-04-13 PROCEDURE — 82728 ASSAY OF FERRITIN: CPT

## 2021-04-13 PROCEDURE — 83540 ASSAY OF IRON: CPT

## 2021-04-13 PROCEDURE — 96374 THER/PROPH/DIAG INJ IV PUSH: CPT | Mod: XU

## 2021-04-13 PROCEDURE — 96375 TX/PRO/DX INJ NEW DRUG ADDON: CPT | Mod: XU

## 2021-04-13 PROCEDURE — 86923 COMPATIBILITY TEST ELECTRIC: CPT

## 2021-04-13 PROCEDURE — 85610 PROTHROMBIN TIME: CPT

## 2021-04-13 PROCEDURE — 86901 BLOOD TYPING SEROLOGIC RH(D): CPT

## 2021-04-13 PROCEDURE — U0003: CPT

## 2021-04-13 PROCEDURE — 83880 ASSAY OF NATRIURETIC PEPTIDE: CPT

## 2021-04-13 PROCEDURE — 87086 URINE CULTURE/COLONY COUNT: CPT

## 2021-04-13 PROCEDURE — 87040 BLOOD CULTURE FOR BACTERIA: CPT

## 2021-04-13 PROCEDURE — 86769 SARS-COV-2 COVID-19 ANTIBODY: CPT

## 2021-04-13 PROCEDURE — M0243: CPT

## 2021-04-13 PROCEDURE — 82248 BILIRUBIN DIRECT: CPT

## 2021-04-13 RX ORDER — GABAPENTIN 400 MG/1
1 CAPSULE ORAL
Qty: 0 | Refills: 0 | DISCHARGE
Start: 2021-04-13

## 2021-04-13 RX ORDER — HYDROXYUREA 500 MG/1
1 CAPSULE ORAL
Qty: 0 | Refills: 0 | DISCHARGE

## 2021-04-13 RX ORDER — PROPRANOLOL HCL 160 MG
1 CAPSULE, EXTENDED RELEASE 24HR ORAL
Qty: 30 | Refills: 1
Start: 2021-04-13 | End: 2021-06-11

## 2021-04-13 RX ORDER — ASPIRIN/CALCIUM CARB/MAGNESIUM 324 MG
1 TABLET ORAL
Qty: 30 | Refills: 0
Start: 2021-04-13 | End: 2021-05-12

## 2021-04-13 RX ADMIN — GABAPENTIN 100 MILLIGRAM(S): 400 CAPSULE ORAL at 05:18

## 2021-04-13 RX ADMIN — ERYTHROPOIETIN 10000 UNIT(S): 10000 INJECTION, SOLUTION INTRAVENOUS; SUBCUTANEOUS at 12:47

## 2021-04-13 RX ADMIN — HEPARIN SODIUM 5000 UNIT(S): 5000 INJECTION INTRAVENOUS; SUBCUTANEOUS at 05:18

## 2021-04-13 RX ADMIN — DOXERCALCIFEROL 1.5 MICROGRAM(S): 2.5 CAPSULE ORAL at 12:45

## 2021-04-13 NOTE — PROGRESS NOTE ADULT - ASSESSMENT
60F PMHx ESRD on HD (TTSat [facility] [place], Dr De León), cirrhosis, JAK2+ PCV with splenomegaly - admitted for anemia, covid19.  Nephrology consulted for ESRD on HD      # ESRD   Pt. with ESRD on HD three times a week (TTS @ [facility], Dr. De León). Last HD was on 4/8/21 via LUE AVF, target/dry weight 53kg. Current weight is 51kg. Pt. clinically stable. Labs reviewed.   plan for maintenance HD today UF goal 1.0L as BP tolerate.    monitor BMP, fluid restriction, renally dose medications per HD, renal diet    # Hypertension  BP in acceptable range. Can resume home med. Low salt diet advised    # Anemia   Pt. with anemia in the setting of ESRD.  On admission Hgb level below acceptable target (hgb 6.8). s/p 1u prbc 4/10  c/w outpatient epogen 10,000U on HD days  Hg 9 at goal  monitor Hgb    # Renal bone disease  Pt. with hyperphosphatemia in the setting of ESRD  c/w outpatient Hectorol 1.5mcg IVP on HD days  Low phosphorus diet advised. Monitor serum phosphorus. Phos at goal      If you have any questions, please feel free to contact me  Bryanna Jackson  Nephrology Fellow  [phone]  (After 5pm or on weekends please page the on-call fellow)               60F PMHx ESRD on HD (TTSat [facility], Dr De León), cirrhosis, JAK2+ PCV with splenomegaly - admitted for anemia, covid19.  Nephrology consulted for ESRD on HD      # ESRD   Pt. with ESRD on HD three times a week (TTS @ [facility], Dr. De León). Last HD was on 4/8/21 via LUE AVF, target/dry weight 53kg. Current weight is 51kg. Pt. clinically stable. Labs reviewed.   plan for maintenance HD today UF goal 1.0L as BP tolerate.    monitor BMP, fluid restriction, renally dose medications per HD, renal diet    # Hypertension  BP in acceptable range. Can resume home med. Low salt diet advised    # Anemia   Pt. with anemia in the setting of ESRD.  On admission Hgb level below acceptable target (hgb 6.8). s/p 1u prbc 4/10  c/w outpatient epogen 10,000U on HD days  Hg 9   monitor Hgb    # Renal bone disease  Pt. with hyperphosphatemia in the setting of ESRD  c/w outpatient Hectorol 1.5mcg IVP on HD days  Low phosphorus diet advised. Monitor serum phosphorus. Phos at goal      If you have any questions, please feel free to contact me  Bryanna Jackson  Nephrology Fellow  [phone]  (After 5pm or on weekends please page the on-call fellow)

## 2021-04-13 NOTE — PROGRESS NOTE ADULT - SUBJECTIVE AND OBJECTIVE BOX
PROGRESS NOTE:     CONTACT INFO:  Chapo Baltazar  PGY-1 Internal Medicine  [phone]    Patient is a 60y old  Female who presents with a chief complaint of COVID (13 Apr 2021 09:21)    language line Cornelius Cardenas [id number]    SUBJECTIVE / OVERNIGHT EVENTS:   Patient seen and evaluated at bedside. No fever/chills.  Denies SOB at rest, chest pain, palpitations, abdominal pain, nausea/vomiting. explained that pt not requring oxygen w/ covid, that pt after Hd today can likely be d/c, quarentine for 10 days. Pt will also need to f/u with Dr. Guo hematologist in 2 weeks (Dr. uGo office will make appt), and will NOT start hydroxyurea until pt gets labs w/ Dr. Guo. Pt agreeable to plan/       MEDICATIONS  (STANDING):  doxercalciferol Injectable 1.5 MICROGram(s) IV Push <User Schedule>  epoetin filiberto-epbx (RETACRIT) Injectable 60005 Unit(s) IV Push <User Schedule>  gabapentin 100 milliGRAM(s) Oral two times a day  heparin   Injectable 5000 Unit(s) SubCutaneous every 8 hours  lidocaine   Patch 1 Patch Transdermal every 24 hours  propranolol 10 milliGRAM(s) Oral two times a day    MEDICATIONS  (PRN):      CAPILLARY BLOOD GLUCOSE        I&O's Summary    12 Apr 2021 07:01  -  13 Apr 2021 07:00  --------------------------------------------------------  IN: 480 mL / OUT: 0 mL / NET: 480 mL        PHYSICAL EXAM:  Vital Signs Last 24 Hrs  T(C): 36.6 (13 Apr 2021 04:37), Max: 36.8 (12 Apr 2021 19:51)  T(F): 97.9 (13 Apr 2021 04:37), Max: 98.3 (12 Apr 2021 19:51)  HR: 74 (13 Apr 2021 04:37) (66 - 74)  BP: 105/67 (13 Apr 2021 04:37) (105/67 - 136/86)  BP(mean): --  RR: 18 (13 Apr 2021 04:37) (18 - 18)  SpO2: 94% (13 Apr 2021 04:37) (94% - 97%)    CONSTITUTIONAL: NAD, well-developed  RESPIRATORY: Normal respiratory effort; lungs are clear to auscultation bilaterally  CARDIOVASCULAR: Regular rate and rhythm, normal S1 and S2, no murmur/rub/gallop; trace  lower extremity edema; Peripheral pulses are 2+ bilaterally  ABDOMEN: Nontender to palpation, normoactive bowel sounds, no rebound/guarding; No hepatosplenomegaly  MUSCLOSKELETAL: no clubbing or cyanosis of digits; no joint swelling or tenderness to palpation  PSYCH: A+O to person, place, and time; affect appropriate    LABS:                        9.0    3.22  )-----------( 149      ( 13 Apr 2021 06:24 )             29.0     04-13    136  |  99  |  47<H>  ----------------------------<  95  4.0   |  20<L>  |  7.53<H>    Ca    8.2<L>      13 Apr 2021 06:24  Phos  4.0     04-13  Mg     2.2     04-13    TPro  7.3  /  Alb  3.7  /  TBili  2.6<H>  /  DBili  x   /  AST  15  /  ALT  8<L>  /  AlkPhos  93  04-13              Culture - Blood (collected 11 Apr 2021 10:30)  Source: .Blood Blood-Peripheral  Preliminary Report (12 Apr 2021 11:01):    No growth to date.    Culture - Blood (collected 11 Apr 2021 10:30)  Source: .Blood Blood-Peripheral  Preliminary Report (12 Apr 2021 11:00):    No growth to date.        RADIOLOGY & ADDITIONAL TESTS:  Results Reviewed:   Imaging Personally Reviewed:  Electrocardiogram Personally Reviewed:    COORDINATION OF CARE:  Care Discussed with Consultants/Other Providers [Y/N]:  Prior or Outpatient Records Reviewed [Y/N]:

## 2021-04-13 NOTE — DISCHARGE NOTE NURSING/CASE MANAGEMENT/SOCIAL WORK - PATIENT PORTAL LINK FT
You can access the FollowMyHealth Patient Portal offered by [facility] by registering at the following website: [email or web address]. By joining [employer]’s FollowMyHealth portal, you will also be able to view your health information using other applications (apps) compatible with our system. Current 130.1 pounds

## 2021-04-13 NOTE — PROGRESS NOTE ADULT - PROBLEM SELECTOR PLAN 4
History of splenomegaly and gastric varices seen on previous imaging due to PCV  - history of JAK2+ PCV, not on hydroxyurea  - evidence of extramedullary hematopoesis on previous imaging  - hemolysis work up sent for possible autoimmune hemolysis   - found to have elevated indirect bilirubin 1.7 and   - follow up haptoglobin   - continue prophylactic propanolol for varices

## 2021-04-13 NOTE — PROGRESS NOTE ADULT - PROBLEM SELECTOR PLAN 3
Hemodialysis on T/Th/Sat, AV fistula 3 years ago  - continue dialysis inpatient, completed last night 4/10  - nephrology consulted, plan for Hd tomorrow in AM Hemodialysis on T/Th/Sat, AV fistula 3 years ago  - continue dialysis inpatient, completed last night 4/10  - nephrology consulted, plan for Hd today, will d/w  to set up HD for outpt.

## 2021-04-13 NOTE — PROGRESS NOTE ADULT - PROBLEM SELECTOR PLAN 5
Systolic blood pressures in acceptable range

## 2021-04-13 NOTE — PROGRESS NOTE ADULT - PROBLEM SELECTOR PLAN 1
Tested positive for COVID-19 on 4/4, symptomatic with shortness of breath, lethargy, weakness, worsening after monoclonal antibody therapy  - symptomatic for the past 5 days, has had sick contacts, multiple family members positive   - COVID PCR positive, CXR consistent with COVID pneumonia  - procalcitonin elevated to 20, likely also increased in setting of ESRD   - currently saturating well on RA  - started on Xarelto 10 mg PO daily x 2 weeks for thombophylaxis by hematologist; will not start due to kidney function  - hold chemical prophylaxis at this time until platelets improve   - no evidence of fevers ; no indications for remdesivir/dexamethasone at this time Tested positive for COVID-19 on 4/4, symptomatic with shortness of breath, lethargy, weakness, worsening after monoclonal antibody therapy  - symptomatic for the past 5 days, has had sick contacts, multiple family members positive   - COVID PCR positive, CXR consistent with COVID pneumonia  - procalcitonin elevated to 20, likely also increased in setting of ESRD   - currently saturating well on RA  - started on Xarelto 10 mg PO daily x 2 weeks for thombophylaxis by hematologist; will not start due to kidney function  - hold chemical prophylaxis at this time until platelets improve   - no evidence of fevers ; no indications for remdesivir/dexamethasone at this time  pt not requring supplemental oxygen

## 2021-04-13 NOTE — PROGRESS NOTE ADULT - PROBLEM SELECTOR PLAN 2
Leukopenia, thrombocytopenia and anemic on CBC on admission; compared to previous CBC few weeks ago  - s/p 1 unit pRBC , monitor Hgb/Hct and CBC, now stable this AM at 8.6  - hold hydroxyurea for now given possibility of myelosuppression.    - given history of JAK2+ polycythemia vera, could have possible auto-immune component as well as recent monoclonal antibody therapy   - per daughter, patients symptoms worsened after the treatment  - B12/Folate within normal limits  - follow up iron deficiency work up ; ferritin elevated >1300  will d/w heme about possible d/c as pancytopenia imporving. Leukopenia, thrombocytopenia and anemic on CBC on admission; compared to previous CBC few weeks ago  - s/p 1 unit pRBC , monitor Hgb/Hct and CBC, now stable  - hold hydroxyurea for now given possibility of myelosuppression.    - given history of JAK2+ polycythemia vera, could have possible auto-immune component as well as recent monoclonal antibody therapy   - B12/Folate within normal limits  - follow up iron deficiency work up ; ferritin elevated >1300  - d/w outpt heme attending, okay to be d/c with current cbc values, hold hydroxyurea, pt to f/u in 2 wks

## 2021-04-13 NOTE — PROGRESS NOTE ADULT - ATTENDING COMMENTS
ESRD on HD, stable, plan for maintenance HD today
Patient seen and examined, case d/w house staff.  COVID-19 infection, stable, not requiring oxygen  Pan cytopenia-resolved  d/c planning, total d/c time 45 minutes.
Patient seen and examined today. I agree with the above findings, assessment, and plan with the following additions and exceptions:     #. Pancytopenia -- Ddx broad. Unclear etiology at this time. Myelofibrosis? T. Bili elevation noted in setting of anemia. LDH up. Hapto pending. no schistocytes on smear. Check direct anyi. No s/s of blood loss. 1 unit of pRRB going. H/H improved. HOLD Hydrea for now given risk of further myelosuppression. I am not aware of any literature regarding monoclonal ab therapy causing pancytopenia. Given fever at home, f/u Bcx. But as no clear source of infection, we will monitor off abx. Procal noted. Low threshold to start abx if sepsis develops. Heme/onc consult is likely non-urgent at this time. Would consult them on 4/12. Would work to get outpt records of prior bone marrow biopsy.    #. ESRD -- HD tonight. C/w Hectorol, EPO, an Renal diet. Nephrology assistance appreciated.   #. COVID -19 -- no clear indication for remdesivir or decadron. On. RA. Continue incentive spirometer. Dyspnea likely related to volume overload. Improved after HD.   #. Gastric varices -- continue home bb to reduce portal HTN. Use hold parameters. Monitor vitals.  Rest of plan as above    Dr. Mick Khan DO  Attending Physician  Division of Hospital Medicine  [facility]  Pager:  [phone]
Plan for discharge  Total d/c time 40 minutes

## 2021-04-13 NOTE — PROGRESS NOTE ADULT - ASSESSMENT
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

## 2021-04-13 NOTE — PROGRESS NOTE ADULT - SUBJECTIVE AND OBJECTIVE BOX
[facility] DIVISION OF KIDNEY DISEASES AND HYPERTENSION -- FOLLOW UP NOTE  --------------------------------------------------------------------------------  HPI: 60F PMHx ESRD on HD (TTS at [facility], Dr De León), cirrhosis, JAK2+ PCV with splenomegaly, pancreatic cysts and HTN who present to ED for shortness of breath with +COVID19 test.  Pt tested positive for covid19 pcr last week 4/4/21 soon after which experienced chills, body aches, cough, fever (tmax 104F axillary), generalized weakness, sick contact ( had similar symptoms).  Pt received monoclonal covid antibody infusion 2 days ago.  Today symptoms worsened with new onset of dizziness/lightheadedness and difficulty ambulating.      Nephrology consulted for ESRD on HD.  Patient has history kidney disease, initially on PD then transition to HD, currently goes to [facility] under the care of Dr. De León.  Pt is on TTS schedule, last treatment was on Thursday 4/8/21 and target/dry weight is 53kg.  In ED, afebrile, CXR show b/l patchy consistent w/ multifocal infection w/ labs significant for hgb 6.8, wbc 1.75 and pt given 1u blood transfusion.  Pt seen and examined in ED, vitals non tachycardic, non hypotensive appears non-toxic w/ good bruits/thrills in LUE AVF.      24 hour events/subjective: Pt seen & examined. Voices no complaints. NO SOB currently. HD today. Pt admits to some weight loss & recently gets on 1L UF during HD        PAST HISTORY  --------------------------------------------------------------------------------  No significant changes to PMH, PSH, FHx, SHx, unless otherwise noted    ALLERGIES & MEDICATIONS  --------------------------------------------------------------------------------  Allergies    No Known Allergies    Intolerances      Standing Inpatient Medications  doxercalciferol Injectable 1.5 MICROGram(s) IV Push <User Schedule>  epoetin filiberto-epbx (RETACRIT) Injectable 74125 Unit(s) IV Push <User Schedule>  gabapentin 100 milliGRAM(s) Oral two times a day  heparin   Injectable 5000 Unit(s) SubCutaneous every 8 hours  lidocaine   Patch 1 Patch Transdermal every 24 hours  propranolol 10 milliGRAM(s) Oral two times a day    PRN Inpatient Medications      REVIEW OF SYSTEMS  --------------------------------------------------------------------------------  Gen: No fevers/chills  Skin: No rashes  Head/Eyes/Ears: Normal hearing,   Respiratory: No dyspnea, cough  CV: No chest pain  GI: No abdominal pain, diarrhea  : No dysuria, hematuria  MSK: No  edema  Heme: No easy bruising or bleeding  Psych: No significant depression      All other systems were reviewed and are negative, except as noted.    VITALS/PHYSICAL EXAM  --------------------------------------------------------------------------------  T(C): 36.6 (04-13-21 @ 04:37), Max: 36.8 (04-12-21 @ 19:51)  HR: 74 (04-13-21 @ 04:37) (66 - 74)  BP: 105/67 (04-13-21 @ 04:37) (105/67 - 136/86)  RR: 18 (04-13-21 @ 04:37) (18 - 18)  SpO2: 94% (04-13-21 @ 04:37) (94% - 97%)  Wt(kg): --        04-12-21 @ 07:01  -  04-13-21 @ 07:00  --------------------------------------------------------  IN: 480 mL / OUT: 0 mL / NET: 480 mL        Physical Exam:  	Gen: NAD, on RA  	HEENT: MMM  	Pulm: CTA B/L  	CV: S1S2  	Abd: Soft, +BS   	Ext: No LE edema B/L  	Neuro: Awake  	Skin: Warm and dry  	Vascular access: L AVF with thrill & bruit      LABS/STUDIES  --------------------------------------------------------------------------------              9.0    3.22  >-----------<  149      [04-13-21 @ 06:24]              29.0     136  |  99  |  47  ----------------------------<  95      [04-13-21 @ 06:24]  4.0   |  20  |  7.53        Ca     8.2     [04-13-21 @ 06:24]      Mg     2.2     [04-13-21 @ 06:24]      Phos  4.0     [04-13-21 @ 06:24]    TPro  7.3  /  Alb  3.7  /  TBili  2.6  /  DBili  x   /  AST  15  /  ALT  8   /  AlkPhos  93  [04-13-21 @ 06:24]          Creatinine Trend:  SCr 7.53 [04-13 @ 06:24]  SCr 6.40 [04-12 @ 10:56]  SCr 4.14 [04-11 @ 06:41]  SCr 8.13 [04-10 @ 14:58]        Iron 42, TIBC 109, %sat 38      [04-11-21 @ 11:26]  Ferritin 1564      [04-12-21 @ 16:29]  HbA1c 4.9      [06-01-19 @ 00:46]    HBsAb 9.5      [04-10-21 @ 23:35]  HBsAg Nonreact      [04-10-21 @ 23:35]  HBcAb Nonreact      [04-10-21 @ 23:35]  HCV 0.21, Nonreact      [04-10-21 @ 23:35]  HIV Nonreact      [04-11-21 @ 08:31]

## 2021-04-14 ENCOUNTER — TRANSCRIPTION ENCOUNTER (OUTPATIENT)
Age: 61
End: 2021-04-14

## 2021-04-22 ENCOUNTER — TRANSCRIPTION ENCOUNTER (OUTPATIENT)
Age: 61
End: 2021-04-22

## 2021-04-27 ENCOUNTER — APPOINTMENT (OUTPATIENT)
Dept: ENDOVASCULAR SURGERY | Facility: CLINIC | Age: 61
End: 2021-04-27
Payer: MEDICAID

## 2021-04-27 ENCOUNTER — RESULT REVIEW (OUTPATIENT)
Age: 61
End: 2021-04-27

## 2021-04-27 VITALS
SYSTOLIC BLOOD PRESSURE: 116 MMHG | HEART RATE: 90 BPM | OXYGEN SATURATION: 98 % | TEMPERATURE: 98 F | BODY MASS INDEX: 20.7 KG/M2 | DIASTOLIC BLOOD PRESSURE: 74 MMHG | WEIGHT: 116.84 LBS | RESPIRATION RATE: 20 BRPM | HEIGHT: 63 IN

## 2021-04-27 PROCEDURE — 36907Z: CUSTOM | Mod: 59

## 2021-04-27 PROCEDURE — 36902Z: CUSTOM

## 2021-04-29 ENCOUNTER — INPATIENT (INPATIENT)
Facility: HOSPITAL | Age: 61
LOS: 3 days | Discharge: HOME | DRG: 682 | End: 2021-05-03
Attending: HOSPITALIST | Admitting: STUDENT IN AN ORGANIZED HEALTH CARE EDUCATION/TRAINING PROGRAM
Payer: MEDICAID

## 2021-04-29 VITALS
OXYGEN SATURATION: 99 % | RESPIRATION RATE: 20 BRPM | DIASTOLIC BLOOD PRESSURE: 89 MMHG | HEIGHT: 62 IN | SYSTOLIC BLOOD PRESSURE: 151 MMHG | HEART RATE: 80 BPM | WEIGHT: 116.84 LBS | TEMPERATURE: 99 F

## 2021-04-29 DIAGNOSIS — Z99.2 DEPENDENCE ON RENAL DIALYSIS: Chronic | ICD-10-CM

## 2021-04-29 DIAGNOSIS — T85.898A OTHER SPECIFIED COMPLICATION OF OTHER INTERNAL PROSTHETIC DEVICES, IMPLANTS AND GRAFTS, INITIAL ENCOUNTER: Chronic | ICD-10-CM

## 2021-04-29 DIAGNOSIS — D64.9 ANEMIA, UNSPECIFIED: ICD-10-CM

## 2021-04-29 DIAGNOSIS — I77.0 ARTERIOVENOUS FISTULA, ACQUIRED: Chronic | ICD-10-CM

## 2021-04-29 LAB
ALBUMIN SERPL ELPH-MCNC: 3.9 G/DL — SIGNIFICANT CHANGE UP (ref 3.3–5)
ALP SERPL-CCNC: 194 U/L — HIGH (ref 40–120)
ALT FLD-CCNC: 6 U/L — LOW (ref 10–45)
ANION GAP SERPL CALC-SCNC: 13 MMOL/L — SIGNIFICANT CHANGE UP (ref 5–17)
ANISOCYTOSIS BLD QL: SIGNIFICANT CHANGE UP
APTT BLD: 37.3 SEC — HIGH (ref 27.5–35.5)
AST SERPL-CCNC: 13 U/L — SIGNIFICANT CHANGE UP (ref 10–40)
BASOPHILS # BLD AUTO: 0.07 K/UL — SIGNIFICANT CHANGE UP (ref 0–0.2)
BASOPHILS NFR BLD AUTO: 0.9 % — SIGNIFICANT CHANGE UP (ref 0–2)
BILIRUB SERPL-MCNC: 2.4 MG/DL — HIGH (ref 0.2–1.2)
BLD GP AB SCN SERPL QL: NEGATIVE — SIGNIFICANT CHANGE UP
BUN SERPL-MCNC: 21 MG/DL — SIGNIFICANT CHANGE UP (ref 7–23)
CALCIUM SERPL-MCNC: 8.8 MG/DL — SIGNIFICANT CHANGE UP (ref 8.4–10.5)
CHLORIDE SERPL-SCNC: 95 MMOL/L — LOW (ref 96–108)
CO2 SERPL-SCNC: 27 MMOL/L — SIGNIFICANT CHANGE UP (ref 22–31)
CREAT SERPL-MCNC: 4.13 MG/DL — HIGH (ref 0.5–1.3)
DACRYOCYTES BLD QL SMEAR: SLIGHT — SIGNIFICANT CHANGE UP
ELLIPTOCYTES BLD QL SMEAR: SLIGHT — SIGNIFICANT CHANGE UP
EOSINOPHIL # BLD AUTO: 0.13 K/UL — SIGNIFICANT CHANGE UP (ref 0–0.5)
EOSINOPHIL NFR BLD AUTO: 1.8 % — SIGNIFICANT CHANGE UP (ref 0–6)
GIANT PLATELETS BLD QL SMEAR: PRESENT — SIGNIFICANT CHANGE UP
GLUCOSE SERPL-MCNC: 100 MG/DL — HIGH (ref 70–99)
HCT VFR BLD CALC: 22.4 % — LOW (ref 34.5–45)
HGB BLD-MCNC: 6.7 G/DL — CRITICAL LOW (ref 11.5–15.5)
INR BLD: 1.18 RATIO — HIGH (ref 0.88–1.16)
LYMPHOCYTES # BLD AUTO: 0.72 K/UL — LOW (ref 1–3.3)
LYMPHOCYTES # BLD AUTO: 9.6 % — LOW (ref 13–44)
MACROCYTES BLD QL: SLIGHT — SIGNIFICANT CHANGE UP
MANUAL SMEAR VERIFICATION: SIGNIFICANT CHANGE UP
MCHC RBC-ENTMCNC: 29.9 GM/DL — LOW (ref 32–36)
MCHC RBC-ENTMCNC: 30.3 PG — SIGNIFICANT CHANGE UP (ref 27–34)
MCV RBC AUTO: 101.4 FL — HIGH (ref 80–100)
MICROCYTES BLD QL: SLIGHT — SIGNIFICANT CHANGE UP
MONOCYTES # BLD AUTO: 0.46 K/UL — SIGNIFICANT CHANGE UP (ref 0–0.9)
MONOCYTES NFR BLD AUTO: 6.1 % — SIGNIFICANT CHANGE UP (ref 2–14)
NEUTROPHILS # BLD AUTO: 6.1 K/UL — SIGNIFICANT CHANGE UP (ref 1.8–7.4)
NEUTROPHILS NFR BLD AUTO: 77.2 % — HIGH (ref 43–77)
NEUTS BAND # BLD: 4.4 % — SIGNIFICANT CHANGE UP (ref 0–8)
OB PNL STL: NEGATIVE — SIGNIFICANT CHANGE UP
OVALOCYTES BLD QL SMEAR: SLIGHT — SIGNIFICANT CHANGE UP
PLAT MORPH BLD: ABNORMAL
PLATELET # BLD AUTO: 184 K/UL — SIGNIFICANT CHANGE UP (ref 150–400)
POIKILOCYTOSIS BLD QL AUTO: SIGNIFICANT CHANGE UP
POLYCHROMASIA BLD QL SMEAR: SLIGHT — SIGNIFICANT CHANGE UP
POTASSIUM SERPL-MCNC: 4.5 MMOL/L — SIGNIFICANT CHANGE UP (ref 3.5–5.3)
POTASSIUM SERPL-SCNC: 4.5 MMOL/L — SIGNIFICANT CHANGE UP (ref 3.5–5.3)
PROT SERPL-MCNC: 7.6 G/DL — SIGNIFICANT CHANGE UP (ref 6–8.3)
PROTHROM AB SERPL-ACNC: 14 SEC — HIGH (ref 10.6–13.6)
RBC # BLD: 2.21 M/UL — LOW (ref 3.8–5.2)
RBC # FLD: 20.1 % — HIGH (ref 10.3–14.5)
RBC BLD AUTO: ABNORMAL
RH IG SCN BLD-IMP: POSITIVE — SIGNIFICANT CHANGE UP
SARS-COV-2 RNA SPEC QL NAA+PROBE: DETECTED
SODIUM SERPL-SCNC: 135 MMOL/L — SIGNIFICANT CHANGE UP (ref 135–145)
WBC # BLD: 7.47 K/UL — SIGNIFICANT CHANGE UP (ref 3.8–10.5)
WBC # FLD AUTO: 7.47 K/UL — SIGNIFICANT CHANGE UP (ref 3.8–10.5)

## 2021-04-29 PROCEDURE — 71045 X-RAY EXAM CHEST 1 VIEW: CPT | Mod: 26

## 2021-04-29 PROCEDURE — 99223 1ST HOSP IP/OBS HIGH 75: CPT

## 2021-04-29 PROCEDURE — 99285 EMERGENCY DEPT VISIT HI MDM: CPT

## 2021-04-29 PROCEDURE — 93010 ELECTROCARDIOGRAM REPORT: CPT

## 2021-04-29 RX ORDER — PANTOPRAZOLE SODIUM 20 MG/1
1 TABLET, DELAYED RELEASE ORAL
Qty: 0 | Refills: 0 | DISCHARGE

## 2021-04-29 NOTE — H&P ADULT - ASSESSMENT
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

## 2021-04-29 NOTE — ED ADULT NURSE REASSESSMENT NOTE - NS ED NURSE REASSESS COMMENT FT1
Report received from DARREN Tran. Pt a & o x 4, able to follow commands. Breathing spontaneous & nonlabored. Abdomen soft & nondistended. IV site patent, no signs of phlebitis, flushing without difficulty. Complain's of fatigue. Call bell within reach, bed in lowest position.

## 2021-04-29 NOTE — ED PROVIDER NOTE - PHYSICAL EXAMINATION
Physical Exam:  Gen: NAD, AOx3, non-toxic appearing, able to ambulate without assistance  HEENT: normal conjunctiva, tongue midline, oral mucosa moist  Lung: CTAB, no respiratory distress, no wheezes/rhonchi/rales B/L, speaking in full sentences  CV: RRR, no murmurs, rubs or gallops  Abd: soft, NT, ND, no guarding, no rigidity   : no external hemorrhoids, no melanotic stool   MSK: no visible deformities, ROM normal in UE/LE, no back pain  Neuro: No focal sensory or motor deficits  Skin: Warm, well perfused, no rash, no leg swelling  Psych: normal affect, calm  Yola Schmidt D.O. -Resident

## 2021-04-29 NOTE — H&P ADULT - PROBLEM SELECTOR PLAN 4
SCDs due to r/o GIB  renal/dash diet History of splenomegaly and gastric varices seen on previous imaging due to PCV  - history of JAK2+ PCV, hydroxyurea on hold 2/2 pancytopenia noted on prior admission  - c/w propranolol 10mg   - f/u hemolysis labs as above

## 2021-04-29 NOTE — H&P ADULT - PROBLEM SELECTOR PLAN 2
ESRD on HD M/W/F via LUE AVF, s/p recent angioplasty on fistula 4/27/21  euvolemic on exam, no gross electrolytes abn - no need for urgent HD   renal consult for HD   c/w phoslo

## 2021-04-29 NOTE — ED PROVIDER NOTE - OBJECTIVE STATEMENT
60y F w/ PMHx ESRD on HD (T/Th/S), hx of splenomegaly and splenic infarct 2/2 to polycythemia vera, HTN, pancreatic cysts, gastric varices 2/2 portal hypertension, recent COVID+ in April 2021 presenting with low hemoglobin on routine labs during dialysis. Patient also endorses one episode of "dark stool" today. States she has required transfusions in the past, also has gotten Epo infusions. Denies fever, chills, dizziness, palpitations, chest pain, vision changes, n/v/d, dysuria, hematuria, abnormal vaginal bleeding.    Nephrologist: Dr. De León 60y F w/ PMHx ESRD on HD (T/Th/S), hx of splenomegaly and splenic infarct 2/2 to polycythemia vera, HTN, pancreatic cysts, gastric varices 2/2 portal hypertension, recent COVID+ in April 2021 presenting with low hemoglobin on routine labs during dialysis. Patient also endorses one episode of "dark stool" today. States she has required transfusions in the past, also has gotten Epo infusions. Endorses some dizziness. Denies fever, chills, palpitations, chest pain, vision changes, n/v/d, dysuria, hematuria, abnormal vaginal bleeding.    Nephrologist: Dr. De León 60y F w/ PMHx ESRD on HD (M/W/F), hx of splenomegaly and splenic infarct 2/2 to polycythemia vera, HTN, pancreatic cysts, gastric varices 2/2 portal hypertension, recent COVID+ in April 2021 presenting with low hemoglobin on routine labs during dialysis. Patient also endorses one episode of "dark stool" today. States she has required transfusions in the past, also has gotten Epo infusions. Endorses some dizziness. Denies fever, chills, palpitations, chest pain, vision changes, n/v/d, dysuria, hematuria, abnormal vaginal bleeding.    Nephrologist: Dr. De León

## 2021-04-29 NOTE — H&P ADULT - NSHPLABSRESULTS_GEN_ALL_CORE
Labs, imaging and EKG personally reviewed and interpreted by me.                           6.7    7.47  )-----------( 184      ( 29 Apr 2021 19:28 )             22.4     04-29    135  |  95<L>  |  21  ----------------------------<  100<H>  4.5   |  27  |  4.13<H>    Ca    8.8      29 Apr 2021 19:28    TPro  7.6  /  Alb  3.9  /  TBili  2.4<H>  /  DBili  x   /  AST  13  /  ALT  6<L>  /  AlkPhos  194<H>  04-29        PT/INR - ( 29 Apr 2021 19:28 )   PT: 14.0 sec;   INR: 1.18 ratio      < from: Xray Chest 1 View- PORTABLE-Urgent (Xray Chest 1 View- PORTABLE-Urgent .) (04.29.21 @ 20:17) >    FINDINGS:  Leftbrachiocephalic and brachial vein stent.  The heart size is normal.  The lungs are clear. No pleural effusion or pneumothorax.  No acute osseous findings.    IMPRESSION:  Clear lungs.    < end of copied text >         PTT - ( 29 Apr 2021 19:28 )  PTT:37.3 sec

## 2021-04-29 NOTE — H&P ADULT - HISTORY OF PRESENT ILLNESS
60F with PMH of ESRD on HD (M/W/F), hx of splenomegaly and splenic infarct 2/2 to polycythemia vera, HTN, pancreatic cysts, gastric varices 2/2 portal hypertension, recent admission 4/10-4/14 for COVID+ with anemia requiring 1u PRBC p/w anemia noted on outpt labs. Had blood works drawn after HD yesterday and called by center today noting her Hb was in the 6s and to come to ED for blood transfusion. Endorses fatigue, denies any other associated symptoms - denies lightheadedness, dizziness, CP, SOB, palpitations, syncope. Pt endorses ongoing but improved mild dry cough since COVID diagnosis. Had brief episode of nausea this morning which resolved and notes one episode of dark green/brown, but not black, stool this morning which she attributes to eating leafy greens last night. Denies any BRBPR, no hematemesis, no hematuria, or other bleeding. Endorse she has required intermittent PRBC transfusions in the past. Pt just recently had a balloon angioplasty of her AVF on Tues 4/27/21.

## 2021-04-29 NOTE — ED ADULT NURSE REASSESSMENT NOTE - NS ED NURSE REASSESS COMMENT FT1
attempted to insert IV heplock but unsuccessful.  pt is dialysis pt with flat veins.  MD Schmidt notified who will attempt insertion via u/s.

## 2021-04-29 NOTE — ED PROVIDER NOTE - CHIEF COMPLAINT
The patient is a 60y Female complaining of  The patient is a 60y Female complaining of low hemoglobin

## 2021-04-29 NOTE — ED PROVIDER NOTE - ATTENDING CONTRIBUTION TO CARE
Attending MD Sosa:  I personally have seen and examined this patient.  Resident note reviewed and agree on plan of care and except where noted.  See HPI, PE, and MDM for details.

## 2021-04-29 NOTE — H&P ADULT - NSHPOUTPATIENTPROVIDERS_GEN_ALL_CORE
Nephro - Dr De León 10y F w/ PMHx ESRD on HD (M/W/F), hx of splenomegaly and splenic infarct 2/2 to polycythemia vera, HTN, pancreatic cysts, gastric varices 2/2 portal hypertension, recent COVID+ in April 2021

## 2021-04-29 NOTE — ED PROVIDER NOTE - CLINICAL SUMMARY MEDICAL DECISION MAKING FREE TEXT BOX
Attending MD Sosa:  60F with ho ESRD on HD presenting with report of low hb of 6 on outpatient HD labs. Denies bleeding symptoms. Likely anemia related to chronic renal insufficiency. Will obtain repeat labs, likely transfuse as patient reports some dizziness. Will discuss with renal to coordinate transfusion with HD      *The above represents an initial assessment/impression. Please refer to progress notes for potential changes in patient clinical course*

## 2021-04-29 NOTE — H&P ADULT - PROBLEM SELECTOR PLAN 1
acute anemia with Hb 6.7 with recent baseline ~9. suspect based on hx this may be 2/2 chronic renal disease, low suspicion for GI bleed.   - s/p 1u PRBC in ED, f/u CBC after   - recent iron studies from prior admission more c/w anemia of chronic disease    - given hx of gastric varices, will check stool occult, protonix PO for now; GI consult as needed pending FOBT  - has persistently elevated Tbili from prior admission, hemolysis ruled out during that time - low suspicion, but will check LDH and hapto with next blood draw   - trend CBC   - hold ASA/pharm AC for now

## 2021-04-29 NOTE — ED PROVIDER NOTE - NS ED ROS FT
CONSTITUTIONAL: No fevers, no chills, no lightheadedness, no dizziness  EYES: no visual changes  CV: No chest pain, no palpitations  RESP: No SOB, no cough  GI: No n/v/d, no abd pain  : no dysuria, no hematuria, no flank pain  MSK: no back pain, no extremity pain  SKIN: no rashes  NEURO: no headache, no focal weakness, no decreased sensation/paresthesias

## 2021-04-29 NOTE — ED ADULT NURSE REASSESSMENT NOTE - NS ED NURSE REASSESS COMMENT FT1
2045: 1 unit PRBC given. Consent in chart. Risks and benefits explained to patient. Patient verbalized understanding of risks and benefits. Patient aware of possible side effects. Vital signs stable. Second RN at bedside for confirmation. Pt has US guided IV, per MD El no second IV needed. MD El aware of temp, reports no interventions needed at this time

## 2021-04-29 NOTE — H&P ADULT - NSHPREVIEWOFSYSTEMS_GEN_ALL_CORE
REVIEW OF SYSTEMS:    CONSTITUTIONAL: +fatigue, no fevers or chills  EYES/ENT: No visual changes;  no vertigo or throat pain   NECK: No pain or stiffness  RESPIRATORY: +cough, no shortness of breath  CARDIOVASCULAR: No chest pain or palpitations  GASTROINTESTINAL: no nausea currently, no vomiting, no abdominal pain, no BRBPR  GENITOURINARY: no polyuria, no dysuria  NEUROLOGICAL: no numbness, no headaches, no confusion   MUSCULOSKELETAL: no back pain, no weakness   SKIN: No itching, burning, rashes, or lesions   PSYCH: no anxiety, depression  HEME: no gum bleeding, no bruising

## 2021-04-29 NOTE — H&P ADULT - PROBLEM SELECTOR PLAN 3
History of splenomegaly and gastric varices seen on previous imaging due to PCV  - history of JAK2+ PCV, hydroxyurea on hold 2/2 pancytopenia noted on prior admission  - c/w propranolol 10mg   - f/u hemolysis labs as above pt recently admitted for COVID earlier this month, PCR tonight remains positive, only sx is improving dry cough   - will check COVID 19 Ab  - keep in isolation for now  - f/u with infection control in AM pending Ab testing if pt needs to remain in ISO

## 2021-04-29 NOTE — ED ADULT NURSE NOTE - OBJECTIVE STATEMENT
59 y/o female with pmhx of ESRD sent from 61 y/o female with pmhx of ESRD on HD qM/W/F via L AV shunt c/o abnormal lab value.  per pt, her blood was drawn and she was notified by HD that her hgb=6's.  pt denies any s/s of anemia at this time.  pt is awake, alert and responsive to all stimuli.  no sob or respiratory distress noted.  skin is warm, dry and intact.  no pallor or cyanosis noted at this time.  vss.  safety precautions in place.  will continue to monitor.

## 2021-04-29 NOTE — H&P ADULT - NSHPPHYSICALEXAM_GEN_ALL_CORE
Vital Signs Last 24 Hrs  T(C): 37.3 (29 Apr 2021 21:00), Max: 37.3 (29 Apr 2021 21:00)  T(F): 99.2 (29 Apr 2021 21:00), Max: 99.2 (29 Apr 2021 21:00)  HR: 78 (29 Apr 2021 22:38) (74 - 80)  BP: 129/75 (29 Apr 2021 22:38) (112/69 - 151/89)  BP(mean): 97 (29 Apr 2021 22:38) (97 - 97)  RR: 15 (29 Apr 2021 22:38) (15 - 20)  SpO2: 98% (29 Apr 2021 22:38) (98% - 100%)    PHYSICAL EXAM:  GENERAL: NAD, well-developed  HEAD:  Atraumatic, normocephalic  EYES: EOMI, conjunctiva and sclera clear  NECK: Supple, no JVD  CHEST/LUNG: Clear to auscultation bilaterally; no wheezing or rales  HEART: Regular rate and rhythm; no murmurs  ABDOMEN: Soft, nontender, nondistended; bowel sounds present  EXTREMITIES:  2+ Peripheral Pulses, no edema, LUE AVF +thrill   PSYCH: calm affect, not anxious  NEUROLOGY: non-focal, AAOx3  SKIN: No rashes or lesions  MUSCULOSKELETAL: no back pain, moving all extremities

## 2021-04-30 ENCOUNTER — OUTPATIENT (OUTPATIENT)
Dept: OUTPATIENT SERVICES | Facility: HOSPITAL | Age: 61
LOS: 1 days | Discharge: HOME | End: 2021-04-30

## 2021-04-30 DIAGNOSIS — Z29.9 ENCOUNTER FOR PROPHYLACTIC MEASURES, UNSPECIFIED: ICD-10-CM

## 2021-04-30 DIAGNOSIS — I77.0 ARTERIOVENOUS FISTULA, ACQUIRED: Chronic | ICD-10-CM

## 2021-04-30 DIAGNOSIS — Z99.2 DEPENDENCE ON RENAL DIALYSIS: Chronic | ICD-10-CM

## 2021-04-30 DIAGNOSIS — U07.1 COVID-19: ICD-10-CM

## 2021-04-30 DIAGNOSIS — R09.89 OTHER SPECIFIED SYMPTOMS AND SIGNS INVOLVING THE CIRCULATORY AND RESPIRATORY SYSTEMS: ICD-10-CM

## 2021-04-30 DIAGNOSIS — R16.1 SPLENOMEGALY, NOT ELSEWHERE CLASSIFIED: ICD-10-CM

## 2021-04-30 DIAGNOSIS — D75.1 SECONDARY POLYCYTHEMIA: ICD-10-CM

## 2021-04-30 DIAGNOSIS — N18.6 END STAGE RENAL DISEASE: ICD-10-CM

## 2021-04-30 DIAGNOSIS — T85.898A OTHER SPECIFIED COMPLICATION OF OTHER INTERNAL PROSTHETIC DEVICES, IMPLANTS AND GRAFTS, INITIAL ENCOUNTER: Chronic | ICD-10-CM

## 2021-04-30 DIAGNOSIS — D64.9 ANEMIA, UNSPECIFIED: ICD-10-CM

## 2021-04-30 LAB
ALBUMIN SERPL ELPH-MCNC: 3.7 G/DL — SIGNIFICANT CHANGE UP (ref 3.3–5)
ALP SERPL-CCNC: 189 U/L — HIGH (ref 40–120)
ALT FLD-CCNC: 5 U/L — LOW (ref 10–45)
ANION GAP SERPL CALC-SCNC: 15 MMOL/L — SIGNIFICANT CHANGE UP (ref 5–17)
AST SERPL-CCNC: 17 U/L — SIGNIFICANT CHANGE UP (ref 10–40)
BASOPHILS # BLD AUTO: 0.04 K/UL — SIGNIFICANT CHANGE UP (ref 0–0.2)
BASOPHILS # BLD AUTO: 0.07 K/UL — SIGNIFICANT CHANGE UP (ref 0–0.2)
BASOPHILS NFR BLD AUTO: 0.5 % — SIGNIFICANT CHANGE UP (ref 0–2)
BASOPHILS NFR BLD AUTO: 1 % — SIGNIFICANT CHANGE UP (ref 0–2)
BILIRUB DIRECT SERPL-MCNC: 0.3 MG/DL — HIGH (ref 0–0.2)
BILIRUB SERPL-MCNC: 2.2 MG/DL — HIGH (ref 0.2–1.2)
BUN SERPL-MCNC: 25 MG/DL — HIGH (ref 7–23)
CALCIUM SERPL-MCNC: 8.7 MG/DL — SIGNIFICANT CHANGE UP (ref 8.4–10.5)
CHLORIDE SERPL-SCNC: 96 MMOL/L — SIGNIFICANT CHANGE UP (ref 96–108)
CO2 SERPL-SCNC: 24 MMOL/L — SIGNIFICANT CHANGE UP (ref 22–31)
COVID-19 NUCLEOCAPSID GAM AB INTERP: POSITIVE
COVID-19 NUCLEOCAPSID TOTAL GAM ANTIBODY RESULT: 3.99 INDEX — HIGH
COVID-19 SPIKE DOMAIN AB INTERP: POSITIVE
COVID-19 SPIKE DOMAIN ANTIBODY RESULT: >250 U/ML — HIGH
CREAT SERPL-MCNC: 4.94 MG/DL — HIGH (ref 0.5–1.3)
EOSINOPHIL # BLD AUTO: 0.11 K/UL — SIGNIFICANT CHANGE UP (ref 0–0.5)
EOSINOPHIL # BLD AUTO: 0.14 K/UL — SIGNIFICANT CHANGE UP (ref 0–0.5)
EOSINOPHIL NFR BLD AUTO: 1.6 % — SIGNIFICANT CHANGE UP (ref 0–6)
EOSINOPHIL NFR BLD AUTO: 1.7 % — SIGNIFICANT CHANGE UP (ref 0–6)
GLUCOSE SERPL-MCNC: 62 MG/DL — LOW (ref 70–99)
HAPTOGLOB SERPL-MCNC: 25 MG/DL — LOW (ref 34–200)
HCT VFR BLD CALC: 23.1 % — LOW (ref 34.5–45)
HCT VFR BLD CALC: 24.6 % — LOW (ref 34.5–45)
HCT VFR BLD CALC: 24.9 % — LOW (ref 34.5–45)
HCT VFR BLD CALC: 27 % — LOW (ref 34.5–45)
HGB BLD-MCNC: 7.3 G/DL — LOW (ref 11.5–15.5)
HGB BLD-MCNC: 7.5 G/DL — LOW (ref 11.5–15.5)
HGB BLD-MCNC: 7.5 G/DL — LOW (ref 11.5–15.5)
HGB BLD-MCNC: 8.5 G/DL — LOW (ref 11.5–15.5)
IMM GRANULOCYTES NFR BLD AUTO: 1.4 % — SIGNIFICANT CHANGE UP (ref 0–1.5)
IMM GRANULOCYTES NFR BLD AUTO: 2 % — HIGH (ref 0–1.5)
LDH SERPL L TO P-CCNC: 403 U/L — HIGH (ref 50–242)
LYMPHOCYTES # BLD AUTO: 0.93 K/UL — LOW (ref 1–3.3)
LYMPHOCYTES # BLD AUTO: 1.06 K/UL — SIGNIFICANT CHANGE UP (ref 1–3.3)
LYMPHOCYTES # BLD AUTO: 11.5 % — LOW (ref 13–44)
LYMPHOCYTES # BLD AUTO: 15.5 % — SIGNIFICANT CHANGE UP (ref 13–44)
MAGNESIUM SERPL-MCNC: 2.2 MG/DL — SIGNIFICANT CHANGE UP (ref 1.6–2.6)
MCHC RBC-ENTMCNC: 29.9 PG — SIGNIFICANT CHANGE UP (ref 27–34)
MCHC RBC-ENTMCNC: 30.1 GM/DL — LOW (ref 32–36)
MCHC RBC-ENTMCNC: 30.5 GM/DL — LOW (ref 32–36)
MCHC RBC-ENTMCNC: 30.8 PG — SIGNIFICANT CHANGE UP (ref 27–34)
MCHC RBC-ENTMCNC: 31.1 PG — SIGNIFICANT CHANGE UP (ref 27–34)
MCHC RBC-ENTMCNC: 31.1 PG — SIGNIFICANT CHANGE UP (ref 27–34)
MCHC RBC-ENTMCNC: 31.5 GM/DL — LOW (ref 32–36)
MCHC RBC-ENTMCNC: 31.6 GM/DL — LOW (ref 32–36)
MCV RBC AUTO: 102.1 FL — HIGH (ref 80–100)
MCV RBC AUTO: 97.8 FL — SIGNIFICANT CHANGE UP (ref 80–100)
MCV RBC AUTO: 98.3 FL — SIGNIFICANT CHANGE UP (ref 80–100)
MCV RBC AUTO: 99.2 FL — SIGNIFICANT CHANGE UP (ref 80–100)
MONOCYTES # BLD AUTO: 0.33 K/UL — SIGNIFICANT CHANGE UP (ref 0–0.9)
MONOCYTES # BLD AUTO: 0.4 K/UL — SIGNIFICANT CHANGE UP (ref 0–0.9)
MONOCYTES NFR BLD AUTO: 4.8 % — SIGNIFICANT CHANGE UP (ref 2–14)
MONOCYTES NFR BLD AUTO: 4.9 % — SIGNIFICANT CHANGE UP (ref 2–14)
NEUTROPHILS # BLD AUTO: 5.14 K/UL — SIGNIFICANT CHANGE UP (ref 1.8–7.4)
NEUTROPHILS # BLD AUTO: 6.47 K/UL — SIGNIFICANT CHANGE UP (ref 1.8–7.4)
NEUTROPHILS NFR BLD AUTO: 75.1 % — SIGNIFICANT CHANGE UP (ref 43–77)
NEUTROPHILS NFR BLD AUTO: 80 % — HIGH (ref 43–77)
NRBC # BLD: 0 /100 WBCS — SIGNIFICANT CHANGE UP (ref 0–0)
PHOSPHATE SERPL-MCNC: 2.7 MG/DL — SIGNIFICANT CHANGE UP (ref 2.5–4.5)
PLATELET # BLD AUTO: 167 K/UL — SIGNIFICANT CHANGE UP (ref 150–400)
PLATELET # BLD AUTO: 172 K/UL — SIGNIFICANT CHANGE UP (ref 150–400)
PLATELET # BLD AUTO: 182 K/UL — SIGNIFICANT CHANGE UP (ref 150–400)
PLATELET # BLD AUTO: 191 K/UL — SIGNIFICANT CHANGE UP (ref 150–400)
POTASSIUM SERPL-MCNC: 4.6 MMOL/L — SIGNIFICANT CHANGE UP (ref 3.5–5.3)
POTASSIUM SERPL-SCNC: 4.6 MMOL/L — SIGNIFICANT CHANGE UP (ref 3.5–5.3)
PROT SERPL-MCNC: 7.3 G/DL — SIGNIFICANT CHANGE UP (ref 6–8.3)
RBC # BLD: 2.35 M/UL — LOW (ref 3.8–5.2)
RBC # BLD: 2.41 M/UL — LOW (ref 3.8–5.2)
RBC # BLD: 2.41 M/UL — LOW (ref 3.8–5.2)
RBC # BLD: 2.51 M/UL — LOW (ref 3.8–5.2)
RBC # BLD: 2.76 M/UL — LOW (ref 3.8–5.2)
RBC # FLD: 19.2 % — HIGH (ref 10.3–14.5)
RBC # FLD: 19.2 % — HIGH (ref 10.3–14.5)
RBC # FLD: 19.5 % — HIGH (ref 10.3–14.5)
RBC # FLD: 19.6 % — HIGH (ref 10.3–14.5)
RETICS #: 81.7 K/UL — SIGNIFICANT CHANGE UP (ref 25–125)
RETICS/RBC NFR: 3.4 % — HIGH (ref 0.5–2.5)
SARS-COV-2 IGG+IGM SERPL QL IA: 3.99 INDEX — HIGH
SARS-COV-2 IGG+IGM SERPL QL IA: >250 U/ML — HIGH
SARS-COV-2 IGG+IGM SERPL QL IA: POSITIVE
SARS-COV-2 IGG+IGM SERPL QL IA: POSITIVE
SODIUM SERPL-SCNC: 135 MMOL/L — SIGNIFICANT CHANGE UP (ref 135–145)
WBC # BLD: 6.85 K/UL — SIGNIFICANT CHANGE UP (ref 3.8–10.5)
WBC # BLD: 6.89 K/UL — SIGNIFICANT CHANGE UP (ref 3.8–10.5)
WBC # BLD: 6.93 K/UL — SIGNIFICANT CHANGE UP (ref 3.8–10.5)
WBC # BLD: 8.09 K/UL — SIGNIFICANT CHANGE UP (ref 3.8–10.5)
WBC # FLD AUTO: 6.85 K/UL — SIGNIFICANT CHANGE UP (ref 3.8–10.5)
WBC # FLD AUTO: 6.89 K/UL — SIGNIFICANT CHANGE UP (ref 3.8–10.5)
WBC # FLD AUTO: 6.93 K/UL — SIGNIFICANT CHANGE UP (ref 3.8–10.5)
WBC # FLD AUTO: 8.09 K/UL — SIGNIFICANT CHANGE UP (ref 3.8–10.5)

## 2021-04-30 PROCEDURE — 99232 SBSQ HOSP IP/OBS MODERATE 35: CPT

## 2021-04-30 PROCEDURE — 99232 SBSQ HOSP IP/OBS MODERATE 35: CPT | Mod: GC

## 2021-04-30 RX ORDER — DOXERCALCIFEROL 2.5 UG/1
1.5 CAPSULE ORAL
Refills: 0 | Status: DISCONTINUED | OUTPATIENT
Start: 2021-04-30 | End: 2021-05-03

## 2021-04-30 RX ORDER — CALCIUM ACETATE 667 MG
1334 TABLET ORAL
Refills: 0 | Status: DISCONTINUED | OUTPATIENT
Start: 2021-04-30 | End: 2021-05-03

## 2021-04-30 RX ORDER — PANTOPRAZOLE SODIUM 20 MG/1
40 TABLET, DELAYED RELEASE ORAL
Refills: 0 | Status: DISCONTINUED | OUTPATIENT
Start: 2021-04-30 | End: 2021-05-03

## 2021-04-30 RX ORDER — GABAPENTIN 400 MG/1
100 CAPSULE ORAL ONCE
Refills: 0 | Status: COMPLETED | OUTPATIENT
Start: 2021-04-30 | End: 2021-04-30

## 2021-04-30 RX ORDER — ERYTHROPOIETIN 10000 [IU]/ML
10000 INJECTION, SOLUTION INTRAVENOUS; SUBCUTANEOUS
Refills: 0 | Status: DISCONTINUED | OUTPATIENT
Start: 2021-04-30 | End: 2021-05-03

## 2021-04-30 RX ADMIN — PANTOPRAZOLE SODIUM 40 MILLIGRAM(S): 20 TABLET, DELAYED RELEASE ORAL at 05:32

## 2021-04-30 RX ADMIN — Medication 1334 MILLIGRAM(S): at 09:04

## 2021-04-30 RX ADMIN — ERYTHROPOIETIN 10000 UNIT(S): 10000 INJECTION, SOLUTION INTRAVENOUS; SUBCUTANEOUS at 10:26

## 2021-04-30 RX ADMIN — Medication 1334 MILLIGRAM(S): at 16:44

## 2021-04-30 RX ADMIN — DOXERCALCIFEROL 1.5 MICROGRAM(S): 2.5 CAPSULE ORAL at 11:03

## 2021-04-30 NOTE — HISTORY OF PRESENT ILLNESS
[] : left basilic fistula [FreeTextEntry1] : Dr Dempsey  9/18/2017 [FreeTextEntry4] : yesterday  [FreeTextEntry5] : yesterday at 7pm [FreeTextEntry6] : Dr Koroma

## 2021-04-30 NOTE — CONSULT NOTE ADULT - SUBJECTIVE AND OBJECTIVE BOX
[facility] DIVISION OF KIDNEY DISEASES AND HYPERTENSION -- [phone]  -- INITIAL CONSULT NOTE  --------------------------------------------------------------------------------  HPI: 60F PMHx ESRD on HD (MWF at [facility], Dr De León), cirrhosis, JAK2+ PCV with splenomegaly, pancreatic cysts and HTN, recent hx of COVID  was sent into the ED by Dr. De León for Hg of 6.3 on 4/26/21. Pt denies chest pain, palp, SOB, dizziness. She was transfused on last admission but no cause for anemia found.   Nephrology consulted for ESRD on HD.  Patient has history kidney disease, initially on PD then transition to HD, currently goes to [facility] center under the care of Dr. De León.  Pt is on MWF schedule, last treatment was on Wednesday 4/28/21 and target/dry weight is 53kg.  In ED,  labs significant for hgb 6.7 with . FOBT -ve. .Denies any overt bleeding        PAST HISTORY  --------------------------------------------------------------------------------  PAST MEDICAL & SURGICAL HISTORY:  Polycythemia vera    Peptic ulcer disease    Hypertension    Splenomegaly  2015    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    ESRD on peritoneal dialysis    Cirrhosis of liver without ascites, unspecified hepatic cirrhosis type    Pancreatic cyst    Peritoneal dialysis catheter in place  Placed 8/9/2017    Hemoperitoneum as complication of peritoneal dialysis  s/p removal 9/18    AV fistula  Placed 9/18      FAMILY HISTORY:  Family history of hypertension in mother    Family history of malignant neoplasm (Grandparent)  head and neck in father      PAST SOCIAL HISTORY:    ALLERGIES & MEDICATIONS  --------------------------------------------------------------------------------  Allergies    No Known Allergies    Intolerances      Standing Inpatient Medications  calcium acetate 1334 milliGRAM(s) Oral three times a day with meals  pantoprazole    Tablet 40 milliGRAM(s) Oral before breakfast  propranolol 10 milliGRAM(s) Oral daily    PRN Inpatient Medications      REVIEW OF SYSTEMS  --------------------------------------------------------------------------------  Gen: No fevers/chills  Skin: No rashes  Head/Eyes/Ears: Normal hearing,   Respiratory: No dyspnea, cough  CV: No chest pain  GI: No abdominal pain, diarrhea  : No dysuria, hematuria  MSK: No  edema  Heme: No easy bruising or bleeding  Psych: No significant depression    All other systems were reviewed and are negative, except as noted.    VITALS/PHYSICAL EXAM  --------------------------------------------------------------------------------  T(C): 37.1 (04-30-21 @ 00:05), Max: 37.3 (04-29-21 @ 21:00)  HR: 81 (04-30-21 @ 00:05) (74 - 81)  BP: 131/75 (04-30-21 @ 00:05) (112/69 - 151/89)  RR: 18 (04-30-21 @ 00:05) (15 - 20)  SpO2: 98% (04-30-21 @ 00:05) (98% - 100%)  Wt(kg): --  Height (cm): 157.5 (04-29-21 @ 17:38)  Weight (kg): 53 (04-29-21 @ 17:38)  BMI (kg/m2): 21.4 (04-29-21 @ 17:38)  BSA (m2): 1.52 (04-29-21 @ 17:38)      Physical Exam:  	Gen: NAD  	HEENT: MMM  	Pulm: CTA B/L  	CV: S1S2  	Abd: Soft, +BS   	Ext: No LE edema B/L  	Neuro: Awake  	Skin: Warm and dry  	Vascular access: L AVF with thrill/ bruit    LABS/STUDIES  --------------------------------------------------------------------------------              6.7    7.47  >-----------<  184      [04-29-21 @ 19:28]              22.4     135  |  95  |  21  ----------------------------<  100      [04-29-21 @ 19:28]  4.5   |  27  |  4.13        Ca     8.8     [04-29-21 @ 19:28]    TPro  7.6  /  Alb  3.9  /  TBili  2.4  /  DBili  x   /  AST  13  /  ALT  6   /  AlkPhos  194  [04-29-21 @ 19:28]    PT/INR: PT 14.0 , INR 1.18       [04-29-21 @ 19:28]  PTT: 37.3       [04-29-21 @ 19:28]      Creatinine Trend:  SCr 4.13 [04-29 @ 19:28]  SCr 7.53 [04-13 @ 06:24]  SCr 6.40 [04-12 @ 10:56]  SCr 4.14 [04-11 @ 06:41]  SCr 8.13 [04-10 @ 14:58]    Urinalysis - [01-09-21 @ 12:32]      Color Yellow / Appearance Slightly Turbid / SG 1.021 / pH 8.0      Gluc Trace / Ketone Trace  / Bili Negative / Urobili Negative       Blood Trace / Protein 300 mg/dL / Leuk Est Large / Nitrite Negative      RBC 1 /  / Hyaline 8 / Gran  / Sq Epi  / Non Sq Epi 18 / Bacteria Many      Iron 42, TIBC 109, %sat 38      [04-11-21 @ 11:26]  Ferritin 1198      [04-13-21 @ 10:06]  HbA1c 4.9      [06-01-19 @ 00:46]    HBsAb 9.5      [04-10-21 @ 23:35]  HBsAb Reactive      [01-25-20 @ 16:12]  HBsAg Nonreact      [04-10-21 @ 23:35]  HBcAb Nonreact      [04-10-21 @ 23:35]  HCV 0.21, Nonreact      [04-10-21 @ 23:35]  HIV Nonreact      [04-11-21 @ 08:31]    RAHUL: titer 1:80, pattern Homogeneous      [05-16-18 @ 22:43]  C3 Complement 100      [01-19-17 @ 20:01]  C4 Complement 32      [01-19-17 @ 20:01]  Rheumatoid Factor <7.0      [01-19-17 @ 20:01]  ANCA: cANCA Negative, pANCA Negative, atypical ANCA Negative      [01-19-17 @ 20:01]  anti-GBM <0.2      [01-20-17 @ 01:52]  ASLO 59      [01-19-17 @ 20:01]  Syphilis Screen (Treponema Pallidum Ab) Negative      [01-19-17 @ 20:01]  Free Light Chains: kappa 12.00, lambda 14.10, ratio = 0.85      [01-23 @ 14:39]  Immunofixation Serum:   No Monoclonal Band Identified      [01-23-17 @ 14:39]  SPEP Interpretation: Polyclonal Gammopathy      [01-23-17 @ 14:39]

## 2021-04-30 NOTE — CONSULT NOTE ADULT - SUBJECTIVE AND OBJECTIVE BOX
Hematology Oncology Consult Note    HPI:  60F with PMH of ESRD on HD (M/W/F), hx of splenomegaly and splenic infarct 2/2 to polycythemia vera, HTN, pancreatic cysts, gastric varices 2/2 portal hypertension, recent admission 4/10-4/14 for COVID+ with anemia requiring 1u PRBC p/w anemia noted on outpt labs. Had blood works drawn after HD yesterday and called by center today noting her Hb was in the 6s and to come to ED for blood transfusion. Endorses fatigue, denies any other associated symptoms - denies lightheadedness, dizziness, CP, SOB, palpitations, syncope. Pt endorses ongoing but improved mild dry cough since COVID diagnosis. Had brief episode of nausea this morning which resolved and notes one episode of dark green/brown, but not black, stool this morning which she attributes to eating leafy greens last night. Denies any BRBPR, no hematemesis, no hematuria, or other bleeding. Endorse she has required intermittent PRBC transfusions in the past. Pt just recently had a balloon angioplasty of her AVF on Tues 4/27/21.    (29 Apr 2021 23:55)    Hematology consulted for anemia.     PAST MEDICAL & SURGICAL HISTORY:  Polycythemia vera    Peptic ulcer disease    Hypertension    Splenomegaly  2015    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    ESRD on peritoneal dialysis    Cirrhosis of liver without ascites, unspecified hepatic cirrhosis type    Pancreatic cyst    Peritoneal dialysis catheter in place  Placed 8/9/2017    Hemoperitoneum as complication of peritoneal dialysis  s/p removal 9/18    AV fistula  Placed 9/18        FAMILY HISTORY:  Family history of hypertension in mother    Family history of malignant neoplasm (Grandparent)  head and neck in father        MEDICATIONS  (STANDING):  calcium acetate 1334 milliGRAM(s) Oral three times a day with meals  doxercalciferol Injectable 1.5 MICROGram(s) IV Push <User Schedule>  epoetin filiberto-epbx (RETACRIT) Injectable 39717 Unit(s) IV Push <User Schedule>  pantoprazole    Tablet 40 milliGRAM(s) Oral before breakfast  propranolol 10 milliGRAM(s) Oral daily    MEDICATIONS  (PRN):      Allergies    No Known Allergies    Intolerances        SOCIAL HISTORY: No EtOH, no tobacco    Review of Systems:  General: denies fevers/chills  Respiratory: denies cough, shortness of breath  Cardiovascular: denies chest pain, palpitations  Gastrointestinal: denies nausea, vomiting, abdominal pain, constipation, diarrhea, melena, hematochezia  MSK: denies joint pain or muscle pain  Neuro: denies headache, weakness, or parasthesias  Skin: denies rash, petichiae, echymoses  Psych: denies anxiety or sleep disturbances    Height (cm): 157.5 (04-29 @ 17:38)  Weight (kg): 53 (04-29 @ 17:38)  BMI (kg/m2): 21.4 (04-29 @ 17:38)  BSA (m2): 1.52 (04-29 @ 17:38)    T(F): 98.2 (04-30-21 @ 09:50), Max: 99.2 (04-29-21 @ 21:00)  HR: 77 (04-30-21 @ 09:50)  BP: 124/78 (04-30-21 @ 09:50)  RR: 18 (04-30-21 @ 09:50)  SpO2: 98% (04-30-21 @ 09:50)  Wt(kg): --    PHYSICAL EXAM:    Constitutional: NAD  Respiratory: CTA b/l, symmetric chest rise, with normal respiratory effort  Cardiovascular: RRR  Gastrointestinal: soft, NTND  Extremities:  no edema  MSK: no obvious abnormalities  Neurological: Grossly intact  Skin: no rash, no echymoses, no petichiae  Psych: normal affect                          7.5    6.85  )-----------( 182      ( 30 Apr 2021 07:31 )             24.6       04-30    135  |  96  |  25<H>  ----------------------------<  62<L>  4.6   |  24  |  4.94<H>    Ca    8.7      30 Apr 2021 07:32  Phos  2.7     04-30  Mg     2.2     04-30    TPro  7.3  /  Alb  3.7  /  TBili  2.2<H>  /  DBili  0.3<H>  /  AST  17  /  ALT  5<L>  /  AlkPhos  189<H>  04-30      Magnesium, Serum: 2.2 mg/dL (04-30 @ 07:32)  Phosphorus Level, Serum: 2.7 mg/dL (04-30 @ 07:32)  Lactate Dehydrogenase, Serum: 403 U/L (04-30 @ 07:32)       Hematology Consult Note    HPI:  60F with PMH of ESRD on HD (M/W/F), hx of splenomegaly and splenic infarct 2/2 to polycythemia vera, HTN, pancreatic cysts, gastric varices 2/2 portal hypertension, recent admission 4/10-4/14 for COVID+ with anemia requiring 1u PRBC p/w anemia noted on outpt labs. Had blood works drawn after HD yesterday and called by center today noting her Hb was in the 6s and to come to ED for blood transfusion. Endorses fatigue, denies any other associated symptoms - denies lightheadedness, dizziness, CP, SOB, palpitations, syncope. Pt endorses ongoing but improved mild dry cough since COVID diagnosis. Had brief episode of nausea this morning which resolved and notes one episode of dark green/brown, but not black, stool this morning which she attributes to eating leafy greens last night. Denies any BRBPR, no hematemesis, no hematuria, or other bleeding. Endorse she has required intermittent PRBC transfusions in the past. Pt just recently had a balloon angioplasty of her AVF on Tues 4/27/21.    (29 Apr 2021 23:55)    Hematology consulted for anemia. Pt was seen with Syriac speaking physician.    PAST MEDICAL & SURGICAL HISTORY:  Polycythemia vera    Peptic ulcer disease    Hypertension    Splenomegaly  2015    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    ESRD on peritoneal dialysis    Cirrhosis of liver without ascites, unspecified hepatic cirrhosis type    Pancreatic cyst    Peritoneal dialysis catheter in place  Placed 8/9/2017    Hemoperitoneum as complication of peritoneal dialysis  s/p removal 9/18    AV fistula  Placed 9/18        FAMILY HISTORY:  Family history of hypertension in mother    Family history of malignant neoplasm (Grandparent)  head and neck in father        MEDICATIONS  (STANDING):  calcium acetate 1334 milliGRAM(s) Oral three times a day with meals  doxercalciferol Injectable 1.5 MICROGram(s) IV Push <User Schedule>  epoetin filiberto-epbx (RETACRIT) Injectable 00943 Unit(s) IV Push <User Schedule>  pantoprazole    Tablet 40 milliGRAM(s) Oral before breakfast  propranolol 10 milliGRAM(s) Oral daily    MEDICATIONS  (PRN):      Allergies    No Known Allergies    Intolerances        SOCIAL HISTORY: No EtOH, no tobacco    Review of Systems:  General: denies fevers/chills  Respiratory: denies cough, shortness of breath  Cardiovascular: denies chest pain, palpitations  Gastrointestinal: denies nausea, vomiting, abdominal pain, constipation, diarrhea, melena, hematochezia  MSK: denies joint pain or muscle pain  Neuro: denies headache, weakness, or parasthesias  Skin: denies rash, petichiae, echymoses  Psych: denies anxiety or sleep disturbances    Height (cm): 157.5 (04-29 @ 17:38)  Weight (kg): 53 (04-29 @ 17:38)  BMI (kg/m2): 21.4 (04-29 @ 17:38)  BSA (m2): 1.52 (04-29 @ 17:38)    T(F): 98.2 (04-30-21 @ 09:50), Max: 99.2 (04-29-21 @ 21:00)  HR: 77 (04-30-21 @ 09:50)  BP: 124/78 (04-30-21 @ 09:50)  RR: 18 (04-30-21 @ 09:50)  SpO2: 98% (04-30-21 @ 09:50)  Wt(kg): --    PHYSICAL EXAM:    Constitutional: NAD  Respiratory: CTA b/l, symmetric chest rise, with normal respiratory effort  Cardiovascular: RRR  Gastrointestinal: soft, NTND  Extremities: no edema  MSK: no obvious abnormalities  Neurological: Grossly intact  Skin: no rash, no echymoses, no petichiae  Psych: normal affect                          7.5    6.85  )-----------( 182      ( 30 Apr 2021 07:31 )             24.6       04-30    135  |  96  |  25<H>  ----------------------------<  62<L>  4.6   |  24  |  4.94<H>    Ca    8.7      30 Apr 2021 07:32  Phos  2.7     04-30  Mg     2.2     04-30    TPro  7.3  /  Alb  3.7  /  TBili  2.2<H>  /  DBili  0.3<H>  /  AST  17  /  ALT  5<L>  /  AlkPhos  189<H>  04-30      Magnesium, Serum: 2.2 mg/dL (04-30 @ 07:32)  Phosphorus Level, Serum: 2.7 mg/dL (04-30 @ 07:32)  Lactate Dehydrogenase, Serum: 403 U/L (04-30 @ 07:32)

## 2021-04-30 NOTE — PROVIDER CONTACT NOTE (OTHER) - SITUATION
Patient diagnosed with Covid >28days ago and antibodies positive 26 days ago. Asymptomatic. No further isolation required.

## 2021-04-30 NOTE — CONSULT NOTE ADULT - ASSESSMENT
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 Hematology consulted for anemia.     Anemia  - Hb 6.5 on admission, prev 9 on d/c 04/13/21  - retic 3.4%, RI 1.8 suggests hypoproliferation  - Tbili 2.4, elevated since last admission  -   - FOBT negative  - iron studies last admission Tsat 38%, B12/folate wnl, ferritin elevated (but in setting of Covid0    - will review peripheral smear  - recommend send haptoglobin, Direct Any  - recommend send Epo level, ferritin  - recommend fractionate bilirubin and consider liver imaging     - recent balloon angioplasty of her AVF on Tues 4/27/21.     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 Hematology consulted for anemia.     Anemia  - Hb 6.5 on admission, prev 9 on d/c 04/13/21  - retic 3.4%, RI 1.8 suggests hypoproliferation  - Tbili 2.4, elevated since last admission  -   - FOBT negative  - iron studies last admission Tsat 38%, B12/folate wnl, ferritin elevated (but in setting of Covid0  - BMBx 03/23/21 with hemodilute smear with some trilineage hematopoesis with maturation; blood clots with minimal trilineage hematopoiesis. Flow of aspirate showed heterogeneous population of T-cells (with normal CD4 to CD8 ratio, including natural killer-like T-cells and mildly increased proportion of gamma/delta T cells), natural killer cells, and polytypic B-cells. There is no increase in CD34 or  positive cells      - will review peripheral smear  - recommend send haptoglobin, Direct Any  - recommend send Epo level, ferritin  - recommend fractionate bilirubin and consider liver imaging     - recent balloon angioplasty of her AVF on Tues 4/27/21.     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 Hematology consulted for anemia.     Anemia  - Hb 6.5 on admission, prev 9 on d/c 04/13/21  - retic 3.4%, RI 1.8 suggests hypoproliferation  - Tbili 2.4, elevated since last admission  -   - FOBT negative  - iron studies last admission Tsat 38%, B12/folate wnl, ferritin elevated (but in setting of Covid)  - BMBx 03/23/21 with hemodilute smear with some trilineage hematopoesis with maturation; blood clots with minimal trilineage hematopoiesis. Flow of aspirate showed heterogeneous population of T-cells (with normal CD4 to CD8 ratio, including natural killer-like T-cells and mildly increased proportion of gamma/delta T cells), natural killer cells, and polytypic B-cells. There is no increase in CD34 or  positive cells: will need repeat BM Bx in non urgent basis, can be done as outpatient.  - will review peripheral smear  - Haptoglobin 25, , less likely hemolysis. Direct Any on previous admission was negative.  - recommend send Epo level, ferritin  - recommend fractionate bilirubin and consider liver imaging   - Please give erythropoietin with hemodialysis; f/u with nephrology, pt unclear if she had EPO with the last dialysis  - recent balloon angioplasty of her AVF on Tues 4/27/21.   - transfuse to goal Hb >7, Plt >10k (>15k if febrile or >50k if bleeding)      Polycythemia Vera, JAK2 positive  - follows with Dr Jacky Guo  - hold home hydrea in setting of anemia        Pau Guzmán MD  PGY 5, Oncology/Hematology fellow  (P) [phone]  After 5pm, please contact on-call team. 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 Hematology consulted for anemia.     Anemia  - Hb 6.5 on admission, prev 9 on d/c 04/13/21  - retic 3.4%, RI 1.8 suggests hypoproliferation  - Tbili 2.4, elevated since last admission  -   - FOBT negative, last colonoscopy/EGD 2-3 years ago per patient.  - iron studies last admission Tsat 38%, B12/folate wnl, ferritin elevated (but in setting of Covid)  - BMBx 03/23/21 with hemodilute smear with some trilineage hematopoesis with maturation; blood clots with minimal trilineage hematopoiesis. Flow of aspirate showed heterogeneous population of T-cells (with normal CD4 to CD8 ratio, including natural killer-like T-cells and mildly increased proportion of gamma/delta T cells), natural killer cells, and polytypic B-cells. There is no increase in CD34 or  positive cells: will need repeat BM Bx in non urgent basis, can be done as outpatient.  - will review peripheral smear  - Haptoglobin 25, , less likely hemolysis. Direct Any on previous admission was negative.  - recommend send Epo level, ferritin  - recommend fractionate bilirubin and consider liver imaging   - Please give erythropoietin with hemodialysis; f/u with nephrology, pt unclear if she had EPO with the last dialysis  - recent balloon angioplasty of her AVF on Tues 4/27/21.   - transfuse to goal Hb >7, Plt >10k (>15k if febrile or >50k if bleeding)      Polycythemia Vera, JAK2 positive  - follows with Dr Jacky Guo  - hold home hydrea in setting of anemia        Pau Guzmán MD  PGY 5, Oncology/Hematology fellow  (P) [phone]  After 5pm, please contact on-call team.

## 2021-04-30 NOTE — CONSULT NOTE ADULT - ATTENDING COMMENTS
Pt. with ESRD on HD, admitted with low Hb, getting work up for anemia.   Hb improved after blood transfusion.   Hematology note reviewed.   continue INDER with HD.   Plan for maintenance HD today. Monitor BMP
60yoF with a h/o PV with slowly dropping hb over a long period of time but hb dropped significantly over the last few months.   -recent bmbx not clearly diagnostic for pmf but suspicious clinical picture  -would ensure patient is getting procrit at HD and that the order didn't mistakenly fall  -if it did not then high suspicion for progressive fibrosis contributing to worsening anemia in the setting longstanding Polycythemia vera

## 2021-04-30 NOTE — CONSULT NOTE ADULT - ASSESSMENT
60F PMHx ESRD on HD ([facility], Dr D eLeón), cirrhosis, JAK2+ PCV with splenomegaly - admitted for anemia, covid19.  Nephrology consulted for ESRD on HD      # ESRD   Pt. with ESRD on HD three times a week ([facility], Dr. De León). Last HD was on 4/28/21 via LUE AVF, target/dry weight 53kg. Pt. clinically stable. Labs reviewed.   - plan for maintenance HD today.  - K at goal    - monitor BMP, fluid restriction, renally dose medications per HD, renal diet    # Hypertension  BP in acceptable range. Can resume home med. Low salt diet advised    # Anemia   Pt. with anemia in the setting of ESRD.  Hg on 4/26 was 6.3-->6.7 on admission  - pt receiving 1u prbc today. Appears euvolemic currently. Lungs clear & 98% on RA. Will get HD in am  - will resume outpatient epogen 10,000U on HD days and monitor Hgb  -Check iron stores, check B12, folate  FOBT -ve    # Renal bone disease  Pt. with hyperphosphatemia in the setting of ESRD  - resume outpatient Hectorol 1.5mcg IVP on HD days and calcium acetate 1334 mg TID with meals. Low phosphorus diet advised. Monitor serum phosphorus      If you have any questions, please feel free to contact me  Bryanna Jackson  Nephrology Fellow  [phone]  (After 5pm or on weekends please page the on-call fellow)

## 2021-04-30 NOTE — PATIENT PROFILE ADULT - LANGUAGE ASSISTANCE NEEDED
No-Patient/Caregiver offered and refused free interpretation services. able to make needs known in english/No-Patient/Caregiver offered and refused free interpretation services.

## 2021-05-01 LAB
ALBUMIN SERPL ELPH-MCNC: 4 G/DL — SIGNIFICANT CHANGE UP (ref 3.3–5)
ALP SERPL-CCNC: 180 U/L — HIGH (ref 40–120)
ALT FLD-CCNC: 6 U/L — LOW (ref 10–45)
ANION GAP SERPL CALC-SCNC: 15 MMOL/L — SIGNIFICANT CHANGE UP (ref 5–17)
APTT BLD: 36.5 SEC — HIGH (ref 27.5–35.5)
AST SERPL-CCNC: 13 U/L — SIGNIFICANT CHANGE UP (ref 10–40)
BILIRUB DIRECT SERPL-MCNC: 0.3 MG/DL — HIGH (ref 0–0.2)
BILIRUB SERPL-MCNC: 2.3 MG/DL — HIGH (ref 0.2–1.2)
BUN SERPL-MCNC: 19 MG/DL — SIGNIFICANT CHANGE UP (ref 7–23)
CALCIUM SERPL-MCNC: 9.2 MG/DL — SIGNIFICANT CHANGE UP (ref 8.4–10.5)
CHLORIDE SERPL-SCNC: 97 MMOL/L — SIGNIFICANT CHANGE UP (ref 96–108)
CO2 SERPL-SCNC: 25 MMOL/L — SIGNIFICANT CHANGE UP (ref 22–31)
CREAT SERPL-MCNC: 4.07 MG/DL — HIGH (ref 0.5–1.3)
GLUCOSE SERPL-MCNC: 89 MG/DL — SIGNIFICANT CHANGE UP (ref 70–99)
HCT VFR BLD CALC: 25.6 % — LOW (ref 34.5–45)
HCT VFR BLD CALC: 25.9 % — LOW (ref 34.5–45)
HCT VFR BLD CALC: 29.8 % — LOW (ref 34.5–45)
HGB BLD-MCNC: 7.7 G/DL — LOW (ref 11.5–15.5)
HGB BLD-MCNC: 8 G/DL — LOW (ref 11.5–15.5)
HGB BLD-MCNC: 9.3 G/DL — LOW (ref 11.5–15.5)
INR BLD: 1.14 RATIO — SIGNIFICANT CHANGE UP (ref 0.88–1.16)
MAGNESIUM SERPL-MCNC: 2.2 MG/DL — SIGNIFICANT CHANGE UP (ref 1.6–2.6)
MCHC RBC-ENTMCNC: 29.6 PG — SIGNIFICANT CHANGE UP (ref 27–34)
MCHC RBC-ENTMCNC: 30.1 GM/DL — LOW (ref 32–36)
MCHC RBC-ENTMCNC: 30.5 PG — SIGNIFICANT CHANGE UP (ref 27–34)
MCHC RBC-ENTMCNC: 30.6 PG — SIGNIFICANT CHANGE UP (ref 27–34)
MCHC RBC-ENTMCNC: 30.9 GM/DL — LOW (ref 32–36)
MCHC RBC-ENTMCNC: 31.2 GM/DL — LOW (ref 32–36)
MCV RBC AUTO: 98 FL — SIGNIFICANT CHANGE UP (ref 80–100)
MCV RBC AUTO: 98.5 FL — SIGNIFICANT CHANGE UP (ref 80–100)
MCV RBC AUTO: 98.9 FL — SIGNIFICANT CHANGE UP (ref 80–100)
NRBC # BLD: 0 /100 WBCS — SIGNIFICANT CHANGE UP (ref 0–0)
PHOSPHATE SERPL-MCNC: 2.6 MG/DL — SIGNIFICANT CHANGE UP (ref 2.5–4.5)
PLATELET # BLD AUTO: 177 K/UL — SIGNIFICANT CHANGE UP (ref 150–400)
PLATELET # BLD AUTO: 189 K/UL — SIGNIFICANT CHANGE UP (ref 150–400)
PLATELET # BLD AUTO: 271 K/UL — SIGNIFICANT CHANGE UP (ref 150–400)
POTASSIUM SERPL-MCNC: 4.4 MMOL/L — SIGNIFICANT CHANGE UP (ref 3.5–5.3)
POTASSIUM SERPL-SCNC: 4.4 MMOL/L — SIGNIFICANT CHANGE UP (ref 3.5–5.3)
PROT SERPL-MCNC: 7.6 G/DL — SIGNIFICANT CHANGE UP (ref 6–8.3)
PROTHROM AB SERPL-ACNC: 13.6 SEC — SIGNIFICANT CHANGE UP (ref 10.6–13.6)
RBC # BLD: 2.6 M/UL — LOW (ref 3.8–5.2)
RBC # BLD: 2.62 M/UL — LOW (ref 3.8–5.2)
RBC # BLD: 3.04 M/UL — LOW (ref 3.8–5.2)
RBC # FLD: 18.9 % — HIGH (ref 10.3–14.5)
RBC # FLD: 19.2 % — HIGH (ref 10.3–14.5)
RBC # FLD: 19.3 % — HIGH (ref 10.3–14.5)
SODIUM SERPL-SCNC: 137 MMOL/L — SIGNIFICANT CHANGE UP (ref 135–145)
WBC # BLD: 10.82 K/UL — HIGH (ref 3.8–10.5)
WBC # BLD: 7.06 K/UL — SIGNIFICANT CHANGE UP (ref 3.8–10.5)
WBC # BLD: 7.54 K/UL — SIGNIFICANT CHANGE UP (ref 3.8–10.5)
WBC # FLD AUTO: 10.82 K/UL — HIGH (ref 3.8–10.5)
WBC # FLD AUTO: 7.06 K/UL — SIGNIFICANT CHANGE UP (ref 3.8–10.5)
WBC # FLD AUTO: 7.54 K/UL — SIGNIFICANT CHANGE UP (ref 3.8–10.5)

## 2021-05-01 PROCEDURE — 99232 SBSQ HOSP IP/OBS MODERATE 35: CPT

## 2021-05-01 RX ADMIN — Medication 1334 MILLIGRAM(S): at 17:01

## 2021-05-01 RX ADMIN — Medication 1334 MILLIGRAM(S): at 11:44

## 2021-05-01 RX ADMIN — Medication 1334 MILLIGRAM(S): at 08:18

## 2021-05-01 RX ADMIN — GABAPENTIN 100 MILLIGRAM(S): 400 CAPSULE ORAL at 00:04

## 2021-05-01 RX ADMIN — PANTOPRAZOLE SODIUM 40 MILLIGRAM(S): 20 TABLET, DELAYED RELEASE ORAL at 05:16

## 2021-05-02 LAB
ALBUMIN SERPL ELPH-MCNC: 3.7 G/DL — SIGNIFICANT CHANGE UP (ref 3.3–5)
ALP SERPL-CCNC: 185 U/L — HIGH (ref 40–120)
ALT FLD-CCNC: <5 U/L — LOW (ref 10–45)
ANION GAP SERPL CALC-SCNC: 18 MMOL/L — HIGH (ref 5–17)
AST SERPL-CCNC: 12 U/L — SIGNIFICANT CHANGE UP (ref 10–40)
BILIRUB SERPL-MCNC: 2.7 MG/DL — HIGH (ref 0.2–1.2)
BUN SERPL-MCNC: 43 MG/DL — HIGH (ref 7–23)
CALCIUM SERPL-MCNC: 8.9 MG/DL — SIGNIFICANT CHANGE UP (ref 8.4–10.5)
CHLORIDE SERPL-SCNC: 100 MMOL/L — SIGNIFICANT CHANGE UP (ref 96–108)
CO2 SERPL-SCNC: 20 MMOL/L — LOW (ref 22–31)
CREAT SERPL-MCNC: 6.32 MG/DL — HIGH (ref 0.5–1.3)
GLUCOSE SERPL-MCNC: 95 MG/DL — SIGNIFICANT CHANGE UP (ref 70–99)
HCT VFR BLD CALC: 25.7 % — LOW (ref 34.5–45)
HGB BLD-MCNC: 7.8 G/DL — LOW (ref 11.5–15.5)
MAGNESIUM SERPL-MCNC: 2.2 MG/DL — SIGNIFICANT CHANGE UP (ref 1.6–2.6)
MCHC RBC-ENTMCNC: 30.4 GM/DL — LOW (ref 32–36)
MCHC RBC-ENTMCNC: 30.6 PG — SIGNIFICANT CHANGE UP (ref 27–34)
MCV RBC AUTO: 100.8 FL — HIGH (ref 80–100)
NRBC # BLD: 0 /100 WBCS — SIGNIFICANT CHANGE UP (ref 0–0)
PHOSPHATE SERPL-MCNC: 3.2 MG/DL — SIGNIFICANT CHANGE UP (ref 2.5–4.5)
PLATELET # BLD AUTO: 191 K/UL — SIGNIFICANT CHANGE UP (ref 150–400)
POTASSIUM SERPL-MCNC: 5 MMOL/L — SIGNIFICANT CHANGE UP (ref 3.5–5.3)
POTASSIUM SERPL-SCNC: 5 MMOL/L — SIGNIFICANT CHANGE UP (ref 3.5–5.3)
PROT SERPL-MCNC: 7.3 G/DL — SIGNIFICANT CHANGE UP (ref 6–8.3)
RBC # BLD: 2.55 M/UL — LOW (ref 3.8–5.2)
RBC # FLD: 19.5 % — HIGH (ref 10.3–14.5)
SODIUM SERPL-SCNC: 138 MMOL/L — SIGNIFICANT CHANGE UP (ref 135–145)
WBC # BLD: 7.6 K/UL — SIGNIFICANT CHANGE UP (ref 3.8–10.5)
WBC # FLD AUTO: 7.6 K/UL — SIGNIFICANT CHANGE UP (ref 3.8–10.5)

## 2021-05-02 PROCEDURE — 99232 SBSQ HOSP IP/OBS MODERATE 35: CPT

## 2021-05-02 RX ORDER — GABAPENTIN 400 MG/1
100 CAPSULE ORAL ONCE
Refills: 0 | Status: COMPLETED | OUTPATIENT
Start: 2021-05-02 | End: 2021-05-02

## 2021-05-02 RX ADMIN — Medication 1334 MILLIGRAM(S): at 17:04

## 2021-05-02 RX ADMIN — Medication 1334 MILLIGRAM(S): at 07:45

## 2021-05-02 RX ADMIN — Medication 1334 MILLIGRAM(S): at 11:39

## 2021-05-02 RX ADMIN — PANTOPRAZOLE SODIUM 40 MILLIGRAM(S): 20 TABLET, DELAYED RELEASE ORAL at 05:51

## 2021-05-02 RX ADMIN — GABAPENTIN 100 MILLIGRAM(S): 400 CAPSULE ORAL at 06:08

## 2021-05-02 NOTE — PROGRESS NOTE ADULT - PROBLEM SELECTOR PROBLEM 3
2019 novel coronavirus disease (COVID-19)

## 2021-05-02 NOTE — PROGRESS NOTE ADULT - PROBLEM SELECTOR PLAN 3
pt recently admitted for COVID earlier this month, PCR tonight remains positive, only sx is improving dry cough   - COVID 19 Ab+  - no need for isolation
pt recently admitted for COVID earlier this month, PCR tonight remains positive, only sx is improving dry cough   - COVID 19 Ab+  - no need for isolation
pt recently admitted for COVID earlier this month, PCR tonight remains positive, only sx is improving dry cough   - will check COVID 19 Ab  - no need for isolation

## 2021-05-02 NOTE — PROGRESS NOTE ADULT - PROBLEM SELECTOR PLAN 2
ESRD on HD M/W/F via LUE AVF, s/p recent angioplasty on fistula 4/27/21  euvolemic on exam, no gross electrolytes abn - no need for urgent HD   renal consult for HD   c/w phoslo
ESRD on HD M/W/F via LUE AVF, s/p recent angioplasty on fistula 4/27/21  euvolemic on exam, no gross electrolytes abn - no need for urgent HD   HD as per renal  c/w phoslo  c/w hectorol
ESRD on HD M/W/F via LUE AVF, s/p recent angioplasty on fistula 4/27/21  euvolemic on exam, no gross electrolytes abn - no need for urgent HD   HD as per renal  c/w phoslo  c/w hectorol

## 2021-05-02 NOTE — PROGRESS NOTE ADULT - PROBLEM SELECTOR PLAN 1
acute anemia with Hb 6.7 with recent baseline ~9. symptomatic.  Patient with previous similar episodes without bleeding or symptoms.   Recent heme w/u including BMB 1 month ago.   Patient with h/o PCV and regular phlebotomy, known splenomegaly.   Etiology seems multifactorial given renal dysfunction/underlying hematologic issues.   transfused 1 unit overnight. keep >7.    hold ASA/pharm AC for now  hapto 25, , ferritin 1198, Tbili 2.3, Dbili 0.3, EPO level pending  Heme to review peripheral smear, will f/u  INDER with HD
acute anemia with Hb 6.7 with recent baseline ~9. symptomatic.  Patient with previous similar episodes without bleeding or symptoms.   Recent heme w/u including BMB 1 month ago.   Patient with h/o PCV and regular phlebotomy, known splenomegaly.   Etiology seems multifactorial given renal dysfunction/underlying hematologic issues.   Heme eval.  transfused 1 unit overnight. keep >7.    - hold ASA/pharm AC for now
acute anemia with Hb 6.7 with recent baseline ~9. symptomatic.  Patient with previous similar episodes without bleeding or symptoms.   Recent heme w/u including BMB 1 month ago.   Patient with h/o PCV and regular phlebotomy, known splenomegaly.   Etiology seems multifactorial given renal dysfunction/underlying hematologic issues.   transfused 1 unit on admission. required no further transfusions since.   hold ASA/pharm AC for now  hapto 25, , ferritin 1198, Tbili 2.3, Dbili 0.3, EPO level pending  no evidence of hemolysis. peripheral smear unremarkable per heme.  INDER with HD

## 2021-05-02 NOTE — PROGRESS NOTE ADULT - PROBLEM SELECTOR PLAN 5
SCDs due to r/o GIB  renal/dash diet
SCDs due to r/o GIB  renal/dash diet
SCDs in setting of anemia  renal/dash diet    Stable for discharge pending confirmation of HD chair for Monday.  Will need outpatient f/u with her hematologist, Dr. Jacky Guo upon discharge.

## 2021-05-02 NOTE — PROGRESS NOTE ADULT - PROBLEM SELECTOR PLAN 4
History of splenomegaly and gastric varices seen on previous imaging due to PCV  - history of JAK2+ PCV, hydroxyurea on hold 2/2 pancytopenia noted on prior admission  - c/w propranolol 10mg
History of splenomegaly and gastric varices seen on previous imaging due to PCV  - history of JAK2+ PCV, hydroxyurea on hold 2/2 pancytopenia noted on prior admission  - c/w propranolol 10mg   - f/u hemolysis labs as above
History of splenomegaly and gastric varices seen on previous imaging due to PCV  - history of JAK2+ PCV, hydroxyurea on hold 2/2 pancytopenia noted on prior admission  - c/w propranolol 10mg   - f/u hemolysis labs as above

## 2021-05-03 ENCOUNTER — TRANSCRIPTION ENCOUNTER (OUTPATIENT)
Age: 61
End: 2021-05-03

## 2021-05-03 ENCOUNTER — APPOINTMENT (OUTPATIENT)
Dept: HEMATOLOGY ONCOLOGY | Facility: CLINIC | Age: 61
End: 2021-05-03

## 2021-05-03 ENCOUNTER — OUTPATIENT (OUTPATIENT)
Dept: OUTPATIENT SERVICES | Facility: HOSPITAL | Age: 61
LOS: 1 days | Discharge: HOME | End: 2021-05-03

## 2021-05-03 VITALS
SYSTOLIC BLOOD PRESSURE: 99 MMHG | TEMPERATURE: 99 F | RESPIRATION RATE: 18 BRPM | HEART RATE: 85 BPM | DIASTOLIC BLOOD PRESSURE: 66 MMHG | OXYGEN SATURATION: 98 %

## 2021-05-03 DIAGNOSIS — Z99.2 DEPENDENCE ON RENAL DIALYSIS: Chronic | ICD-10-CM

## 2021-05-03 DIAGNOSIS — T85.898A OTHER SPECIFIED COMPLICATION OF OTHER INTERNAL PROSTHETIC DEVICES, IMPLANTS AND GRAFTS, INITIAL ENCOUNTER: Chronic | ICD-10-CM

## 2021-05-03 DIAGNOSIS — I77.0 ARTERIOVENOUS FISTULA, ACQUIRED: Chronic | ICD-10-CM

## 2021-05-03 DIAGNOSIS — D75.1 SECONDARY POLYCYTHEMIA: ICD-10-CM

## 2021-05-03 LAB
ALBUMIN SERPL ELPH-MCNC: 3.8 G/DL — SIGNIFICANT CHANGE UP (ref 3.3–5)
ALP SERPL-CCNC: 179 U/L — HIGH (ref 40–120)
ALT FLD-CCNC: 5 U/L — LOW (ref 10–45)
ANION GAP SERPL CALC-SCNC: 17 MMOL/L — SIGNIFICANT CHANGE UP (ref 5–17)
AST SERPL-CCNC: 14 U/L — SIGNIFICANT CHANGE UP (ref 10–40)
BILIRUB DIRECT SERPL-MCNC: 0.4 MG/DL — HIGH (ref 0–0.2)
BILIRUB INDIRECT FLD-MCNC: 1.3 MG/DL — HIGH (ref 0.2–1)
BILIRUB SERPL-MCNC: 1.7 MG/DL — HIGH (ref 0.2–1.2)
BUN SERPL-MCNC: 65 MG/DL — HIGH (ref 7–23)
CALCIUM SERPL-MCNC: 9.1 MG/DL — SIGNIFICANT CHANGE UP (ref 8.4–10.5)
CHLORIDE SERPL-SCNC: 102 MMOL/L — SIGNIFICANT CHANGE UP (ref 96–108)
CO2 SERPL-SCNC: 18 MMOL/L — LOW (ref 22–31)
CREAT SERPL-MCNC: 7.18 MG/DL — HIGH (ref 0.5–1.3)
GLUCOSE SERPL-MCNC: 94 MG/DL — SIGNIFICANT CHANGE UP (ref 70–99)
HCT VFR BLD CALC: 24.8 % — LOW (ref 34.5–45)
HGB BLD-MCNC: 7.6 G/DL — LOW (ref 11.5–15.5)
MAGNESIUM SERPL-MCNC: 2.3 MG/DL — SIGNIFICANT CHANGE UP (ref 1.6–2.6)
MCHC RBC-ENTMCNC: 30.6 GM/DL — LOW (ref 32–36)
MCHC RBC-ENTMCNC: 30.8 PG — SIGNIFICANT CHANGE UP (ref 27–34)
MCV RBC AUTO: 100.4 FL — HIGH (ref 80–100)
NRBC # BLD: 0 /100 WBCS — SIGNIFICANT CHANGE UP (ref 0–0)
PHOSPHATE SERPL-MCNC: 3.3 MG/DL — SIGNIFICANT CHANGE UP (ref 2.5–4.5)
PLATELET # BLD AUTO: 181 K/UL — SIGNIFICANT CHANGE UP (ref 150–400)
POTASSIUM SERPL-MCNC: 5.3 MMOL/L — SIGNIFICANT CHANGE UP (ref 3.5–5.3)
POTASSIUM SERPL-SCNC: 5.3 MMOL/L — SIGNIFICANT CHANGE UP (ref 3.5–5.3)
PROT SERPL-MCNC: 7.5 G/DL — SIGNIFICANT CHANGE UP (ref 6–8.3)
RBC # BLD: 2.47 M/UL — LOW (ref 3.8–5.2)
RBC # FLD: 19.2 % — HIGH (ref 10.3–14.5)
SODIUM SERPL-SCNC: 137 MMOL/L — SIGNIFICANT CHANGE UP (ref 135–145)
WBC # BLD: 7.42 K/UL — SIGNIFICANT CHANGE UP (ref 3.8–10.5)
WBC # FLD AUTO: 7.42 K/UL — SIGNIFICANT CHANGE UP (ref 3.8–10.5)

## 2021-05-03 PROCEDURE — 99261: CPT

## 2021-05-03 PROCEDURE — 80076 HEPATIC FUNCTION PANEL: CPT

## 2021-05-03 PROCEDURE — 99233 SBSQ HOSP IP/OBS HIGH 50: CPT | Mod: GC

## 2021-05-03 PROCEDURE — 85027 COMPLETE CBC AUTOMATED: CPT

## 2021-05-03 PROCEDURE — 86900 BLOOD TYPING SEROLOGIC ABO: CPT

## 2021-05-03 PROCEDURE — 83010 ASSAY OF HAPTOGLOBIN QUANT: CPT

## 2021-05-03 PROCEDURE — 82668 ASSAY OF ERYTHROPOIETIN: CPT

## 2021-05-03 PROCEDURE — 85045 AUTOMATED RETICULOCYTE COUNT: CPT

## 2021-05-03 PROCEDURE — U0003: CPT

## 2021-05-03 PROCEDURE — 86901 BLOOD TYPING SEROLOGIC RH(D): CPT

## 2021-05-03 PROCEDURE — 80053 COMPREHEN METABOLIC PANEL: CPT

## 2021-05-03 PROCEDURE — 99239 HOSP IP/OBS DSCHRG MGMT >30: CPT

## 2021-05-03 PROCEDURE — U0005: CPT

## 2021-05-03 PROCEDURE — 80048 BASIC METABOLIC PNL TOTAL CA: CPT

## 2021-05-03 PROCEDURE — P9016: CPT

## 2021-05-03 PROCEDURE — 85730 THROMBOPLASTIN TIME PARTIAL: CPT

## 2021-05-03 PROCEDURE — 83615 LACTATE (LD) (LDH) ENZYME: CPT

## 2021-05-03 PROCEDURE — 82248 BILIRUBIN DIRECT: CPT

## 2021-05-03 PROCEDURE — 82272 OCCULT BLD FECES 1-3 TESTS: CPT

## 2021-05-03 PROCEDURE — 36430 TRANSFUSION BLD/BLD COMPNT: CPT

## 2021-05-03 PROCEDURE — 99285 EMERGENCY DEPT VISIT HI MDM: CPT

## 2021-05-03 PROCEDURE — 83735 ASSAY OF MAGNESIUM: CPT

## 2021-05-03 PROCEDURE — 86850 RBC ANTIBODY SCREEN: CPT

## 2021-05-03 PROCEDURE — 86923 COMPATIBILITY TEST ELECTRIC: CPT

## 2021-05-03 PROCEDURE — 85025 COMPLETE CBC W/AUTO DIFF WBC: CPT

## 2021-05-03 PROCEDURE — 71045 X-RAY EXAM CHEST 1 VIEW: CPT

## 2021-05-03 PROCEDURE — 85610 PROTHROMBIN TIME: CPT

## 2021-05-03 PROCEDURE — 84100 ASSAY OF PHOSPHORUS: CPT

## 2021-05-03 PROCEDURE — 86769 SARS-COV-2 COVID-19 ANTIBODY: CPT

## 2021-05-03 RX ADMIN — DOXERCALCIFEROL 1.5 MICROGRAM(S): 2.5 CAPSULE ORAL at 11:30

## 2021-05-03 RX ADMIN — PANTOPRAZOLE SODIUM 40 MILLIGRAM(S): 20 TABLET, DELAYED RELEASE ORAL at 06:26

## 2021-05-03 RX ADMIN — ERYTHROPOIETIN 10000 UNIT(S): 10000 INJECTION, SOLUTION INTRAVENOUS; SUBCUTANEOUS at 10:44

## 2021-05-03 RX ADMIN — Medication 1334 MILLIGRAM(S): at 13:08

## 2021-05-03 RX ADMIN — Medication 1334 MILLIGRAM(S): at 08:16

## 2021-05-03 NOTE — DISCHARGE NOTE PROVIDER - NSDCFUSCHEDAPPT_GEN_ALL_CORE_FT
[facility], [facility] ; 05/03/2021 ; [facility]  [facility], [facility] ; 05/04/2021 ; [facility]  [facility], [facility] ; 05/13/2021 ; [facility]  [facility], [facility] ; 06/29/2021 ; [facility] [address] [facility], MIHYEON ; 05/04/2021 ; [facility]  [facility], [facility] ; 05/13/2021 ; [facility]  [facility], [facility] ; 06/29/2021 ; [facility] [address]

## 2021-05-03 NOTE — CHART NOTE - NSCHARTNOTEFT_GEN_A_CORE
Medicine Attending - Discharge Day Note:  ================================================    # anemia likely due to ESRD  # ESRD on HD    S/p HD this morning. Feels at her baseline. Stable for discharge to home    Discharge time spent 35 minutes.     Christiano Salcedo MD, [facility]  [phone]
Thoroughly reviewed risks and benefits of continuing RRT/HD with patient. She gives verbal consent to dialytic therapy. All questions answered.
60yoF with a h/o PV with slowly dropping hb over a long period of time but hb dropped significantly over the last few months.     - Hb 6.5 on admission, prev 9 on d/c 04/13/21, now stable around ~7.5, s/p last PRBC transfusion 1 unit on 4/29/21, does not require more transfusions.  - retic 3.4%, RI 1.8 suggests hypoproliferation  - Tbili 2.4, elevated since last admission  -   - FOBT negative, last colonoscopy/EGD 2-3 years ago per patient.  -recent bmbx not clearly diagnostic for pmf but suspicious clinical picture  -would ensure patient is getting procrit at HD and that the order didn't mistakenly fall  -if it did not then high suspicion for progressive fibrosis contributing to worsening anemia in the setting longstanding Polycythemia vera.  -Pt follows Dr. Jacky Guo at [facility], will require repeat BM Bx in non urgent basis, can be done as outpatient.  -Continue to hold home hydrea in setting of anemia.  -No contraindication for discharge from a heme standpoint if medically stable.      Pau Guzmán MD  PGY 5, Oncology/Hematology fellow  (P) [phone]  After 5pm, please contact on-call team.

## 2021-05-03 NOTE — DISCHARGE NOTE PROVIDER - HOSPITAL COURSE
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      Problem/Plan - 1:  ·  Problem: Acute anemia.  Plan: acute anemia with Hb 6.7 with recent baseline ~9. symptomatic.  H/H stable today.  Patient with previous similar episodes without bleeding or symptoms.   Recent heme w/u including BMB 1 month ago.   Patient with h/o PCV and regular phlebotomy, known splenomegaly.   Etiology seems multifactorial given renal dysfunction/underlying hematologic issues.   transfused 1 unit on admission. required no further transfusions since.   hold ASA/pharm AC for now  hapto 25, , ferritin 1198, Tbili 2.3, Dbili 0.3, EPO level pending  no evidence of hemolysis. peripheral smear unremarkable per heme.  INDER with HD.      Problem/Plan - 2:  ·  Problem: ESRD (end stage renal disease) on dialysis.  Plan: ESRD on HD M/W/F via LUE AVF, s/p recent angioplasty on fistula 4/27/21  euvolemic on exam, no gross electrolytes abn - no need for urgent HD   HD as per renal  c/w phoslo  c/w hectorol.   Pt had HD today 5/3 and to resume regular HD schedule (MWF).  HD has been set up with .      Problem/Plan - 3:  ·  Problem: 2019 novel coronavirus disease (COVID-19).  Plan: pt recently admitted for COVID earlier this month, PCR tonight remains positive, only sx is improving dry cough   - COVID 19 Ab+  - no need for isolation.      Problem/Plan - 4:  ·  Problem: Splenomegaly.  Plan: History of splenomegaly and gastric varices seen on previous imaging due to PCV  - history of JAK2+ PCV, hydroxyurea on hold 2/2 pancytopenia noted on prior admission  - c/w propranolol 10mg.      Problem/Plan - 5:  ·  Problem: Prophylactic measure.  Plan: SCDs in setting of anemia  renal/dash diet      Will need outpatient f/u with her hematologist, Dr. Jacky Guo upon discharge.     Pt cleared for discharge by Dr. Salcedo and to follow up with heme and PMD.      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      Problem/Plan - 1:  ·  Problem: Acute anemia.  Plan: acute anemia with Hb 6.7 with recent baseline ~9. symptomatic.  H/H stable today.  Patient with previous similar episodes without bleeding or symptoms.   Recent heme w/u including BMB 1 month ago.   Patient with h/o PCV and regular phlebotomy, known splenomegaly.   Etiology seems multifactorial given renal dysfunction/underlying hematologic issues.   transfused 1 unit on admission. required no further transfusions since.   hold ASA/pharm AC for now  hapto 25, , ferritin 1198, Tbili 2.3, Dbili 0.3, EPO level pending  no evidence of hemolysis. peripheral smear unremarkable per heme.  INDER with HD.      Problem/Plan - 2:  ·  Problem: ESRD (end stage renal disease) on dialysis.  Plan: ESRD on HD M/W/F via LUE AVF, s/p recent angioplasty on fistula 4/27/21  euvolemic on exam, no gross electrolytes abn - no need for urgent HD   HD as per renal  c/w phoslo  c/w hectorol.   Pt had HD today 5/3 and to resume regular HD schedule (MWF).  HD has been set up with .      Problem/Plan - 3:  ·  Problem: 2019 novel coronavirus disease (COVID-19).  Plan: pt recently admitted for COVID earlier this month, PCR tonight remains positive, only sx is improving dry cough   - COVID 19 Ab+  - no need for isolation.      Problem/Plan - 4:  ·  Problem: Splenomegaly.  Plan: History of splenomegaly and gastric varices seen on previous imaging due to PCV  - history of JAK2+ PCV, hydroxyurea on hold 2/2 pancytopenia noted on prior admission  - c/w propranolol 10mg.      Problem/Plan - 5:  ·  Problem: Prophylactic measure.  Plan: SCDs in setting of anemia  renal/dash diet      Will need outpatient f/u with her hematologist, Dr. Jacky Guo upon discharge.     Pt cleared for discharge by Dr. Salcedo and to follow up with heme and PMD.       Discharge Diagnoses:  # anemia likely due to ESRD  # ESRD on HD

## 2021-05-03 NOTE — DISCHARGE NOTE PROVIDER - NSDCCPCAREPLAN_GEN_ALL_CORE_FT
PRINCIPAL DISCHARGE DIAGNOSIS  Diagnosis: Symptomatic anemia  Assessment and Plan of Treatment: anemia has since resolved.

## 2021-05-03 NOTE — DISCHARGE NOTE PROVIDER - NSDCMRMEDTOKEN_GEN_ALL_CORE_FT
Aspirin Enteric Coated 81 mg oral delayed release tablet: 1 tab(s) orally once a day   calcium acetate 667 mg oral capsule: 2 tab(s) orally 3 times a day  Pepcid: 10 milligram(s) orally once a day  propranolol 10 mg oral tablet: 1 tab(s) orally once a day

## 2021-05-03 NOTE — PROGRESS NOTE ADULT - SUBJECTIVE AND OBJECTIVE BOX
[facility] DIVISION OF KIDNEY DISEASES AND HYPERTENSION -- FOLLOW UP NOTE  --------------------------------------------------------------------------------  If any questions, please feel free to contact me  NS pager: [phone], [facility]: [phone]  Lincoln Altman M.D.  Nephrology Fellow    (After 5 pm or on weekends please page the on-call fellow)  --------------------------------------------------------------------------------    Chief Complaint:  Patient is a 60y old  Female who presents with a chief complaint of anemia (02 May 2021 13:57)    HPI:  60F PMHx ESRD on HD (MWF at [facility], Dr De León), cirrhosis, JAK2+ PCV with splenomegaly, pancreatic cysts and HTN, recent hx of COVID  was sent into the ED for Hg of 6.3 on 4/26/21. Nephrology consulted for ESKD on HD.  Patient has history kidney disease, initially on PD then transition to HD, currently goes to [facility] center.     Patient seen and examined today at bedside, in NAD, schedule for HD today. Vitals/labs/imaging reviewed         PAST HISTORY  --------------------------------------------------------------------------------  No significant changes to PMH, PSH, FHx, SHx, unless otherwise noted    ALLERGIES & MEDICATIONS  --------------------------------------------------------------------------------  Allergies    No Known Allergies    Intolerances      Standing Inpatient Medications  calcium acetate 1334 milliGRAM(s) Oral three times a day with meals  doxercalciferol Injectable 1.5 MICROGram(s) IV Push <User Schedule>  epoetin filiberto-epbx (RETACRIT) Injectable 19230 Unit(s) IV Push <User Schedule>  pantoprazole    Tablet 40 milliGRAM(s) Oral before breakfast  propranolol 10 milliGRAM(s) Oral daily    PRN Inpatient Medications      REVIEW OF SYSTEMS  --------------------------------------------------------------------------------  Gen: +fatigue  No fevers/chills  Skin: No rashes  Head/Eyes/Ears: Normal hearing,   Respiratory: No dyspnea, cough  CV: No chest pain  GI: No abdominal pain, diarrhea  : No dysuria, hematuria  MSK: No  edema    All other systems were reviewed and are negative, except as noted.    VITALS/PHYSICAL EXAM  --------------------------------------------------------------------------------  T(C): 36.6 (05-03-21 @ 12:50), Max: 37.1 (05-02-21 @ 20:48)  HR: 82 (05-03-21 @ 12:50) (60 - 82)  BP: 112/70 (05-03-21 @ 12:50) (112/70 - 142/83)  RR: 18 (05-03-21 @ 12:50) (18 - 18)  SpO2: 99% (05-03-21 @ 12:50) (95% - 99%)  Wt(kg): --        05-02-21 @ 07:01  -  05-03-21 @ 07:00  --------------------------------------------------------  IN: 600 mL / OUT: 0 mL / NET: 600 mL    05-03-21 @ 07:01  -  05-03-21 @ 12:58  --------------------------------------------------------  IN: 1280 mL / OUT: 2800 mL / NET: -1520 mL        Physical Exam:  	Gen: NAD  	HEENT: MMM  	Pulm: CTA B/L  	CV: S1S2  	Abd: Soft, +BS   	Ext: No LE edema B/L  	Neuro: Awake  	Skin: Warm and dry  	Vascular access: L AVF with thrill/ bruit      LABS/STUDIES  --------------------------------------------------------------------------------              7.6    7.42  >-----------<  181      [05-03-21 @ 06:22]              24.8     137  |  102  |  65  ----------------------------<  94      [05-03-21 @ 06:22]  5.3   |  18  |  7.18        Ca     9.1     [05-03-21 @ 06:22]      Mg     2.3     [05-03-21 @ 06:22]      Phos  3.3     [05-03-21 @ 06:22]    TPro  7.5  /  Alb  3.8  /  TBili  1.7  /  DBili  0.4  /  AST  14  /  ALT  5   /  AlkPhos  179  [05-03-21 @ 06:22]          Creatinine Trend:  SCr 7.18 [05-03 @ 06:22]  SCr 6.32 [05-02 @ 07:31]  SCr 4.07 [05-01 @ 06:22]  SCr 4.94 [04-30 @ 07:32]  SCr 4.13 [04-29 @ 19:28]        Iron 42, TIBC 109, %sat 38      [04-11-21 @ 11:26]  Ferritin 1198      [04-13-21 @ 10:06]  HbA1c 4.9      [06-01-19 @ 00:46]    HBsAb 9.5      [04-10-21 @ 23:35]  HBsAg Nonreact      [04-10-21 @ 23:35]  HBcAb Nonreact      [04-10-21 @ 23:35]  HCV 0.21, Nonreact      [04-10-21 @ 23:35]  HIV Nonreact      [04-11-21 @ 08:31]    
[facility] Medicine  Alexia Vazquez MD  Pager (M-F, 3R-5P): [phone]  Other Times:  [phone]    Patient is a 60y old  Female who presents with a chief complaint of anemia (30 Apr 2021 10:02)      SUBJECTIVE / OVERNIGHT EVENTS:  feeling well. denies any specific symptoms.  afebrile.     ADDITIONAL REVIEW OF SYSTEMS: otherwise neg    MEDICATIONS  (STANDING):  calcium acetate 1334 milliGRAM(s) Oral three times a day with meals  doxercalciferol Injectable 1.5 MICROGram(s) IV Push <User Schedule>  epoetin filiberto-epbx (RETACRIT) Injectable 16357 Unit(s) IV Push <User Schedule>  pantoprazole    Tablet 40 milliGRAM(s) Oral before breakfast  propranolol 10 milliGRAM(s) Oral daily    MEDICATIONS  (PRN):      CAPILLARY BLOOD GLUCOSE        I&O's Summary    29 Apr 2021 07:01  -  30 Apr 2021 07:00  --------------------------------------------------------  IN: 50 mL / OUT: 0 mL / NET: 50 mL    30 Apr 2021 07:01  -  30 Apr 2021 15:11  --------------------------------------------------------  IN: 1360 mL / OUT: 2500 mL / NET: -1140 mL        PHYSICAL EXAM:  Vital Signs Last 24 Hrs  T(C): 36.6 (30 Apr 2021 12:55), Max: 37.3 (29 Apr 2021 21:00)  T(F): 97.8 (30 Apr 2021 12:55), Max: 99.2 (29 Apr 2021 21:00)  HR: 88 (30 Apr 2021 13:25) (74 - 88)  BP: 108/68 (30 Apr 2021 13:25) (108/68 - 151/89)  BP(mean): 97 (29 Apr 2021 22:38) (97 - 97)  RR: 17 (30 Apr 2021 12:55) (15 - 20)  SpO2: 99% (30 Apr 2021 12:55) (97% - 100%)    CONSTITUTIONAL: NAD, well-groomed  EYES:  conjunctiva and sclera clear  ENMT: Moist oral mucosa  NECK: Supple, no palpable masses; no JVD  RESPIRATORY: Normal respiratory effort; lungs are clear to auscultation bilaterally  CARDIOVASCULAR: Regular rate and rhythm, systolic murmur; No lower extremity edema  ABDOMEN: Nontender to palpation, normoactive bowel sounds, no rebound/guarding  MUSCULOSKELETAL:  no clubbing or cyanosis of digits; no joint swelling or tenderness to palpation  PSYCH: A+O to person, place, and time; affect appropriate  SKIN: No rashes; no palpable lesions    LABS:                        7.3    6.93  )-----------( 167      ( 30 Apr 2021 10:00 )             23.1     04-30    135  |  96  |  25<H>  ----------------------------<  62<L>  4.6   |  24  |  4.94<H>    Ca    8.7      30 Apr 2021 07:32  Phos  2.7     04-30  Mg     2.2     04-30    TPro  7.3  /  Alb  3.7  /  TBili  2.2<H>  /  DBili  0.3<H>  /  AST  17  /  ALT  5<L>  /  AlkPhos  189<H>  04-30    PT/INR - ( 29 Apr 2021 19:28 )   PT: 14.0 sec;   INR: 1.18 ratio         PTT - ( 29 Apr 2021 19:28 )  PTT:37.3 sec            RADIOLOGY & ADDITIONAL TESTS:  Results Reviewed:   Imaging Personally Reviewed:  Electrocardiogram Personally Reviewed:    COORDINATION OF CARE:  Care Discussed with Consultants/Other Providers [Y/N]:  Prior or Outpatient Records Reviewed [Y/N]:  
[facility] Division of Hospital Medicine  Krystina Aguilar MD  Pager (M-F, 8A-5P): [phone]  Other Times:  [phone]      Patient is a 60y old  Female who presents with a chief complaint of anemia (01 May 2021 16:12)      SUBJECTIVE / OVERNIGHT EVENTS: no acute events overnight. H/H remains stable in 7 range. no dizziness, lightheadedness. last BM was brown, no melena nor hematochezia. feels well overall.  ADDITIONAL REVIEW OF SYSTEMS:    MEDICATIONS  (STANDING):  calcium acetate 1334 milliGRAM(s) Oral three times a day with meals  doxercalciferol Injectable 1.5 MICROGram(s) IV Push <User Schedule>  epoetin filiberto-epbx (RETACRIT) Injectable 13737 Unit(s) IV Push <User Schedule>  pantoprazole    Tablet 40 milliGRAM(s) Oral before breakfast  propranolol 10 milliGRAM(s) Oral daily    MEDICATIONS  (PRN):      CAPILLARY BLOOD GLUCOSE        I&O's Summary    01 May 2021 07:01  -  02 May 2021 07:00  --------------------------------------------------------  IN: 1390 mL / OUT: 0 mL / NET: 1390 mL    02 May 2021 07:01  -  02 May 2021 13:57  --------------------------------------------------------  IN: 600 mL / OUT: 0 mL / NET: 600 mL        PHYSICAL EXAM:  Vital Signs Last 24 Hrs  T(C): 36.8 (02 May 2021 11:32), Max: 37.3 (01 May 2021 20:05)  T(F): 98.3 (02 May 2021 11:32), Max: 99.1 (01 May 2021 20:05)  HR: 69 (02 May 2021 11:32) (69 - 81)  BP: 123/74 (02 May 2021 11:32) (117/72 - 124/73)  BP(mean): --  RR: 18 (02 May 2021 11:32) (18 - 18)  SpO2: 97% (02 May 2021 11:32) (95% - 97%)    CONSTITUTIONAL: NAD, well-developed, well-groomed  EYES: PERRLA; conjunctiva and sclera clear  ENMT: Moist oral mucosa, no pharyngeal injection or exudates; normal dentition  NECK: Supple, no palpable masses; no thyromegaly  RESPIRATORY: Normal respiratory effort; lungs are clear to auscultation bilaterally  CARDIOVASCULAR: Regular rate and rhythm, normal S1 and S2, no murmur/rub/gallop; No lower extremity edema; Peripheral pulses are 2+ bilaterally, +L AVF with palpable thrill  ABDOMEN: Soft, Nondistended,  Nontender to palpation, normoactive bowel sounds  MUSCULOSKELETAL:  No clubbing or cyanosis of digits; no joint swelling or tenderness to palpation  PSYCH: A+O to person, place, and time; affect appropriate  NEUROLOGY: CN 2-12 are intact and symmetric; no gross sensory deficits   SKIN: No rashes; no palpable lesions      LABS:                        7.8    7.60  )-----------( 191      ( 02 May 2021 07:31 )             25.7     05-02    138  |  100  |  43<H>  ----------------------------<  95  5.0   |  20<L>  |  6.32<H>    Ca    8.9      02 May 2021 07:31  Phos  3.2     05-02  Mg     2.2     05-02    TPro  7.3  /  Alb  3.7  /  TBili  2.7<H>  /  DBili  x   /  AST  12  /  ALT  <5<L>  /  AlkPhos  185<H>  05-02    PT/INR - ( 01 May 2021 06:22 )   PT: 13.6 sec;   INR: 1.14 ratio         PTT - ( 01 May 2021 06:22 )  PTT:36.5 sec        RADIOLOGY & ADDITIONAL TESTS:  Results Reviewed:   Imaging Personally Reviewed:  Electrocardiogram Personally Reviewed:    COORDINATION OF CARE:  Care Discussed with Consultants/Other Providers [Y]: medicine JONEL Silva  Prior or Outpatient Records Reviewed [Y]: heme chart note  
[facility] Division of Hospital Medicine  Krystina Aguilar MD  Pager (M-F, 8A-5P): [phone]  Other Times:  [phone]      Patient is a 60y old  Female who presents with a chief complaint of anemia (30 Apr 2021 15:11)      SUBJECTIVE / OVERNIGHT EVENTS: no acute events overnight. feels well without complaints. no fever, chills, lightheadedness, dizziness, sob, hematochezia nor melena.  ADDITIONAL REVIEW OF SYSTEMS:    MEDICATIONS  (STANDING):  calcium acetate 1334 milliGRAM(s) Oral three times a day with meals  doxercalciferol Injectable 1.5 MICROGram(s) IV Push <User Schedule>  epoetin filiberto-epbx (RETACRIT) Injectable 33521 Unit(s) IV Push <User Schedule>  pantoprazole    Tablet 40 milliGRAM(s) Oral before breakfast  propranolol 10 milliGRAM(s) Oral daily    MEDICATIONS  (PRN):      CAPILLARY BLOOD GLUCOSE        I&O's Summary    30 Apr 2021 07:01  -  01 May 2021 07:00  --------------------------------------------------------  IN: 1710 mL / OUT: 2700 mL / NET: -990 mL    01 May 2021 07:01  -  01 May 2021 16:12  --------------------------------------------------------  IN: 500 mL / OUT: 0 mL / NET: 500 mL        PHYSICAL EXAM:  Vital Signs Last 24 Hrs  T(C): 36.9 (01 May 2021 11:55), Max: 36.9 (01 May 2021 11:55)  T(F): 98.5 (01 May 2021 11:55), Max: 98.5 (01 May 2021 11:55)  HR: 66 (01 May 2021 11:55) (66 - 85)  BP: 118/72 (01 May 2021 11:55) (115/72 - 122/74)  BP(mean): --  RR: 18 (01 May 2021 11:55) (17 - 18)  SpO2: 97% (01 May 2021 11:55) (93% - 97%)    CONSTITUTIONAL: NAD, well-developed, well-groomed  EYES: PERRLA; conjunctiva and sclera clear  ENMT: Moist oral mucosa, no pharyngeal injection or exudates; normal dentition  NECK: Supple, no palpable masses; no thyromegaly  RESPIRATORY: Normal respiratory effort; lungs are clear to auscultation bilaterally  CARDIOVASCULAR: Regular rate and rhythm, normal S1 and S2, no murmur/rub/gallop; No lower extremity edema; Peripheral pulses are 2+ bilaterally, +L AVF with palpable thrill  ABDOMEN: Soft, Nondistended,  Nontender to palpation, normoactive bowel sounds  MUSCULOSKELETAL:  No clubbing or cyanosis of digits; no joint swelling or tenderness to palpation  PSYCH: A+O to person, place, and time; affect appropriate  NEUROLOGY: CN 2-12 are intact and symmetric; no gross sensory deficits   SKIN: No rashes; no palpable lesions    LABS:                        7.7    7.06  )-----------( 189      ( 01 May 2021 06:22 )             25.6     05-01    137  |  97  |  19  ----------------------------<  89  4.4   |  25  |  4.07<H>    Ca    9.2      01 May 2021 06:22  Phos  2.6     05-01  Mg     2.2     05-01    TPro  7.6  /  Alb  4.0  /  TBili  2.3<H>  /  DBili  0.3<H>  /  AST  13  /  ALT  6<L>  /  AlkPhos  180<H>  05-01    PT/INR - ( 01 May 2021 06:22 )   PT: 13.6 sec;   INR: 1.14 ratio         PTT - ( 01 May 2021 06:22 )  PTT:36.5 sec            RADIOLOGY & ADDITIONAL TESTS:  Results Reviewed:   Imaging Personally Reviewed:  Electrocardiogram Personally Reviewed:    COORDINATION OF CARE:  Care Discussed with Consultants/Other Providers [Y]: medicine RONEL Becker  Prior or Outpatient Records Reviewed [Y/N]:

## 2021-05-03 NOTE — PROGRESS NOTE ADULT - ASSESSMENT
60F PMHx ESRD on HD ([facility], Dr De León), cirrhosis, JAK2+ PCV with splenomegaly - admitted for anemia, covid19.  Nephrology consulted for ESRD on HD      # ESRD   Pt. with ESRD on HD three times a week ([facility], Dr. De León).   Last outpatient HD was on 4/28/21 via LUE AVF, target/dry weight 53kg. Pt. clinically stable. Labs reviewed.   plan for maintenance HD today.  monitor BMP, fluid restriction, renally dose medications per HD, renal diet    # Hypertension  BP at target range. Monitor BP on current BP medication. Low salt diet.    # Anemia   Pt. with anemia in the setting of ESRD.  Hg on 4/26 was 6.3-->6.7 on admission  s/p 1 PRBC transfusion.   c/w epogen 10,000U on HD days and monitor Hgb  FOBT -ve    # Renal bone disease  Pt. with hyperphosphatemia in the setting of ESRD  c/w Hectorol 1.5mcg IVP on HD days and calcium acetate 1334 mg TID with meals. Low phosphorus diet advised. Monitor serum phosphorus    
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

## 2021-05-03 NOTE — DISCHARGE NOTE PROVIDER - CARE PROVIDER_API CALL
Jacky Guo)  Hematology; Internal Medicine; Oncology  [address] [place] [address] [id number]  Phone: [phone]  Fax: [phone]  Follow Up Time:

## 2021-05-03 NOTE — DISCHARGE NOTE PROVIDER - NSRESEARCHGRANT_PROPHYLAXISRECOMFT_GEN_A_CORE
Patient c/o pain, managed better with PO Dilaudid. Tolerating diet. One episode nausea in AM, managed with zofran. Doing better in afternoon. Ambulating independently with walker. BRITNEY drain sites reinforced. Plan to d/c home, education complete.        Probl goal  Description  INTERVENTIONS:  - Encourage pt to monitor pain and request assistance  - Assess pain using appropriate pain scale  - Administer analgesics based on type and severity of pain and evaluate response  - Implement non-pharmacological measures with IV or PO as ordered and tolerated  - Evaluate effectiveness of GI medications  - Encourage mobilization and activity  - Obtain nutritional consult as needed  - Establish a toileting routine/schedule  - Consider collaborating with pharmacy to review pa IMPROVE-DD Application Not Available

## 2021-05-03 NOTE — DISCHARGE NOTE NURSING/CASE MANAGEMENT/SOCIAL WORK - PATIENT PORTAL LINK FT
You can access the FollowMyHealth Patient Portal offered by [facility] by registering at the following website: [email or web address]. By joining [facility]’s FollowMyHealth portal, you will also be able to view your health information using other applications (apps) compatible with our system.

## 2021-05-04 ENCOUNTER — APPOINTMENT (OUTPATIENT)
Dept: HEMATOLOGY ONCOLOGY | Facility: CLINIC | Age: 61
End: 2021-05-04

## 2021-05-04 ENCOUNTER — APPOINTMENT (OUTPATIENT)
Dept: VASCULAR SURGERY | Facility: CLINIC | Age: 61
End: 2021-05-04

## 2021-05-04 ENCOUNTER — NON-APPOINTMENT (OUTPATIENT)
Age: 61
End: 2021-05-04

## 2021-05-04 ENCOUNTER — TRANSCRIPTION ENCOUNTER (OUTPATIENT)
Age: 61
End: 2021-05-04

## 2021-05-04 LAB — EPO SERPL-MCNC: 43.9 MIU/ML — HIGH (ref 2.6–18.5)

## 2021-05-13 ENCOUNTER — RESULT REVIEW (OUTPATIENT)
Age: 61
End: 2021-05-13

## 2021-05-13 ENCOUNTER — APPOINTMENT (OUTPATIENT)
Dept: HEMATOLOGY ONCOLOGY | Facility: CLINIC | Age: 61
End: 2021-05-13
Payer: COMMERCIAL

## 2021-05-13 ENCOUNTER — OUTPATIENT (OUTPATIENT)
Dept: OUTPATIENT SERVICES | Facility: HOSPITAL | Age: 61
LOS: 1 days | Discharge: HOME | End: 2021-05-13

## 2021-05-13 VITALS
HEART RATE: 80 BPM | TEMPERATURE: 98 F | DIASTOLIC BLOOD PRESSURE: 81 MMHG | OXYGEN SATURATION: 98 % | BODY MASS INDEX: 20.23 KG/M2 | RESPIRATION RATE: 16 BRPM | SYSTOLIC BLOOD PRESSURE: 136 MMHG | WEIGHT: 114.2 LBS | HEIGHT: 62.99 IN

## 2021-05-13 DIAGNOSIS — D75.1 SECONDARY POLYCYTHEMIA: ICD-10-CM

## 2021-05-13 DIAGNOSIS — T85.898A OTHER SPECIFIED COMPLICATION OF OTHER INTERNAL PROSTHETIC DEVICES, IMPLANTS AND GRAFTS, INITIAL ENCOUNTER: Chronic | ICD-10-CM

## 2021-05-13 DIAGNOSIS — Z99.2 DEPENDENCE ON RENAL DIALYSIS: Chronic | ICD-10-CM

## 2021-05-13 DIAGNOSIS — I77.0 ARTERIOVENOUS FISTULA, ACQUIRED: Chronic | ICD-10-CM

## 2021-05-13 LAB
ANISOCYTOSIS BLD QL: SLIGHT — SIGNIFICANT CHANGE UP
BASOPHILS # BLD AUTO: 0 K/UL — SIGNIFICANT CHANGE UP (ref 0–0.2)
BASOPHILS NFR BLD AUTO: 0 % — SIGNIFICANT CHANGE UP (ref 0–2)
EOSINOPHIL # BLD AUTO: 0.27 K/UL — SIGNIFICANT CHANGE UP (ref 0–0.5)
EOSINOPHIL NFR BLD AUTO: 3 % — SIGNIFICANT CHANGE UP (ref 0–6)
HCT VFR BLD CALC: 27.6 % — LOW (ref 34.5–45)
HGB BLD-MCNC: 8.3 G/DL — LOW (ref 11.5–15.5)
LYMPHOCYTES # BLD AUTO: 1.7 K/UL — SIGNIFICANT CHANGE UP (ref 1–3.3)
LYMPHOCYTES # BLD AUTO: 19 % — SIGNIFICANT CHANGE UP (ref 13–44)
MCHC RBC-ENTMCNC: 30.1 G/DL — LOW (ref 32–36)
MCHC RBC-ENTMCNC: 31 PG — SIGNIFICANT CHANGE UP (ref 27–34)
MCV RBC AUTO: 103 FL — HIGH (ref 80–100)
MICROCYTES BLD QL: SLIGHT — SIGNIFICANT CHANGE UP
MONOCYTES # BLD AUTO: 0 K/UL — SIGNIFICANT CHANGE UP (ref 0–0.9)
MONOCYTES NFR BLD AUTO: 0 % — LOW (ref 2–14)
NEUTROPHILS # BLD AUTO: 6.97 K/UL — SIGNIFICANT CHANGE UP (ref 1.8–7.4)
NEUTROPHILS NFR BLD AUTO: 78 % — HIGH (ref 43–77)
NRBC # BLD: 0 /100 — SIGNIFICANT CHANGE UP (ref 0–0)
NRBC # BLD: SIGNIFICANT CHANGE UP /100 WBCS (ref 0–0)
PLAT MORPH BLD: NORMAL — SIGNIFICANT CHANGE UP
PLATELET # BLD AUTO: 227 K/UL — SIGNIFICANT CHANGE UP (ref 150–400)
RBC # BLD: 2.68 M/UL — LOW (ref 3.8–5.2)
RBC # FLD: 19.9 % — HIGH (ref 10.3–14.5)
RBC BLD AUTO: SIGNIFICANT CHANGE UP
RETICS #: 106.7 K/UL — SIGNIFICANT CHANGE UP (ref 25–125)
RETICS/RBC NFR: 4 % — HIGH (ref 0.5–2.5)
WBC # BLD: 8.93 K/UL — SIGNIFICANT CHANGE UP (ref 3.8–10.5)
WBC # FLD AUTO: 8.93 K/UL — SIGNIFICANT CHANGE UP (ref 3.8–10.5)

## 2021-05-13 PROCEDURE — 99214 OFFICE O/P EST MOD 30 MIN: CPT

## 2021-05-13 NOTE — PROGRESS NOTE ADULT - PROBLEM SELECTOR PLAN 2
Despite some risk factors, the patient does not demonstrate definitive evidence of glaucoma at this time. Currently blood pressure is stable, with mild tachycardia likely from underlying anemia. Possibly 2/2 traction of PD catheter with underlying varices  - CT abdomen/pelvis with hemoperitoneum, multiple hematomas and varices  - Continue Q12 CBCs  - Avoiding further lavages Possibly 2/2 traction of PD catheter with underlying varices  - CT abdomen/pelvis with hemoperitoneum, multiple hematomas and varices, incr in size of epigastric hematoma - surg reconsulted  - Continue Q12 CBCs  - Avoiding further lavages

## 2021-05-14 NOTE — HISTORY OF PRESENT ILLNESS
[de-identified] : This is a 59 year old woman with a history of MAK 2 + polycythemia vera.   diagnosed in 2012. 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 Hollie Guzmán in [place] (# [phone]). She has had a bone marrow biopsy done at the time of diagnosis.  Was previously non complaint with therapy there, however has been quite complaint with the hydroxyurea since coming to [facility] fellows clinic here in 2017.  Patient now follows with Dr. Jacky Guo after closure of the fellows clinic.  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 Hematology was consulted and recommended phlebotomy, but patient refused. She was discharged on Hydrea 500mg on HD days.  which she is still on. As of 9/27/19 she has transferred from the hematology fellows clinic to my service at [facility].  \par \par Does not feel much side effects from the hydrea, but gets nausea with the dialysis.  \par  [de-identified] : Patient Had COVID 19 infection this past April CBC dropped form 9's down to 7's  Hydrea d/amberly given thrombocytopenias during the COVID infection.  \par Patient feeling well after covid infection.  Hg 8.3g/dl today. platelets 227.  \par \par Patient to restart Hydrea 500mg  MWF.  Will send refill top pharmacy.

## 2021-05-14 NOTE — PHYSICAL EXAM
[Normal] : full range of motion and no deformities appreciated [de-identified] : Spleen tip palpable in the region below the costophrenic angle about 5cm below infierior rib margin.

## 2021-05-14 NOTE — REVIEW OF SYSTEMS
[Negative] : ENT [FreeTextEntry6] : some residual shortness of breath following COVID 19 infection and recovery.

## 2021-05-14 NOTE — ASSESSMENT
[FreeTextEntry1] : This is a 58 woman with a history of Polycythemia Vera, MAK 2 + since 2012. 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  She is now under my super vision for the P. Vera.  Patient had a normal hemoglobin on last follow up in 2019. Hg 12.5g/dl, was unable to explain why it was suddenly normal at the time.  More recently admitted for abdominal pain  secondary to splenomegaly with splenic infarcts.  This had responded very well to the use of hydroxyurea. Hg came up with treatment up to 9.9 at one point, pain from splenomegaly decreased significantly.  Patient was admitted for COVID 19 infection at the end of April.  Given hydrea d/amberly.  \par \par Hg 8.3g/dl WBC 8.9 platelets 227.  Stable now. Recommend restarting hydrea 500mg TIW on MWF. \par \par We still had suspicions of patient progressing into the myelofibrosis given cytopenias.  BM biopsy was unsuccessful in finding a core. WIll give the patient a break from workup for a month then repeat bone marrow biopsy in April.

## 2021-05-19 LAB
ALBUMIN SERPL ELPH-MCNC: 4.3 G/DL
ALP BLD-CCNC: 147 U/L
ALT SERPL-CCNC: 8 U/L
ANION GAP SERPL CALC-SCNC: 13 MMOL/L
AST SERPL-CCNC: 18 U/L
BILIRUB SERPL-MCNC: 2.2 MG/DL
BUN SERPL-MCNC: 17 MG/DL
CALCIUM SERPL-MCNC: 8.9 MG/DL
CHLORIDE SERPL-SCNC: 97 MMOL/L
CO2 SERPL-SCNC: 30 MMOL/L
CREAT SERPL-MCNC: 4.23 MG/DL
GLUCOSE SERPL-MCNC: 127 MG/DL
POTASSIUM SERPL-SCNC: 4.6 MMOL/L
PROT SERPL-MCNC: 7.8 G/DL
SODIUM SERPL-SCNC: 139 MMOL/L

## 2021-05-20 NOTE — PATIENT PROFILE ADULT. - NS PRO MODE OF ARRIVAL
(2019) 54 yof, coming because of single episode of rectal bleeding, following a severe episode of constipation, unresponsive to Linzess. Last week, she took a laxative and had a difficult BM with bright red blood mixed with the stool.   A colonoscopy 2016 demonstrated ascending colon diverticular disease.   Internal hemorrhoids is the likely cause of bleeding.   Will nevertheless repeat the colonoscopy to R/O neoplasia.
ambulatory

## 2021-05-25 ENCOUNTER — NON-APPOINTMENT (OUTPATIENT)
Age: 61
End: 2021-05-25

## 2021-05-25 ENCOUNTER — APPOINTMENT (OUTPATIENT)
Dept: INTERNAL MEDICINE | Facility: CLINIC | Age: 61
End: 2021-05-25

## 2021-05-25 VITALS
WEIGHT: 112 LBS | HEART RATE: 78 BPM | SYSTOLIC BLOOD PRESSURE: 132 MMHG | OXYGEN SATURATION: 98 % | DIASTOLIC BLOOD PRESSURE: 70 MMHG | HEIGHT: 62 IN | BODY MASS INDEX: 20.61 KG/M2

## 2021-05-28 ENCOUNTER — APPOINTMENT (OUTPATIENT)
Age: 61
End: 2021-05-28

## 2021-06-07 NOTE — DIETITIAN INITIAL EVALUATION ADULT. - ENERGY NEEDS
Assessment:  1  Cervical disc disorder with radiculopathy of mid-cervical region    2  Myofascial pain syndrome        Plan:  The patient experiencing ongoing pain in the right-sided neck shoulder blade region into the shoulder down the arm that is not responded to trigger point injections or the home exercise program and physical therapy that she has done for 6 weeks  At this time I will order an MRI of the cervical spine to evaluate further  I advised her I will call the results and discuss treatment moving forward  My impressions and treatment recommendations were discussed in detail with the patient who verbalized understanding and had no further questions  Discharge instructions were provided  I personally saw and examined the patient and I agree with the above discussed plan of care  Orders Placed This Encounter   Procedures    MRI cervical spine without contrast     Standing Status:   Future     Standing Expiration Date:   6/7/2025     Scheduling Instructions: There is no preparation for this test  Please leave your jewelry and valuables at home, wedding rings are the exception  Magnetic nail polish must be removed prior to arrival for your test  Please bring your insurance cards, a form of photo ID and a list of your medications with you  Arrive 15 minutes prior to your appointment time in order to register  Please bring any prior CT or MRI studies of this area that were not performed at a [facility]  To schedule this appointment, please contact Central Scheduling at [phone]  Prior to your appointment, please make sure you complete the MRI Screening Form when you e-Check in for your appointment  This will be available starting 7 days before your appointment in [address]  You may receive an e-mail with an activation code if you do not have a [employer] account  If you do not have access to a device, we will complete your screening at your appointment       Order Specific Question:   Is the patient pregnant? Answer:   No     Order Specific Question:   What is the patient's sedation requirement? Answer:   No Sedation     Order Specific Question:   Release to patient through Mychart     Answer:   Immediate     Order Specific Question:   Is order priority selected as STAT? Answer:   No     Order Specific Question:   Reason for Exam (FREE TEXT)     Answer:   neck pain into right hand with numbness and tingling     No orders of the defined types were placed in this encounter  History of Present Illness:  Roel Cifuentes is a 40 y o  female who presents for a follow up office visit in regards to Neck Pain and Shoulder Pain (right side)  The patient was last seen on 05/03/2021 at which time she underwent trigger point injections  She reports minimal improvement and continues to experience pain in the neck that radiates into the shoulder blade up into the head as well as into the shoulder down the arm at times described to be dull/aching and throbbing  She has been doing physical therapy as well as home exercise program for the last 6 weeks which has been mildly helpful but is still symptomatic  I have personally reviewed and/or updated the patient's past medical history, past surgical history, family history, social history, current medications, allergies, and vital signs today  Review of Systems   Respiratory: Negative for shortness of breath  Cardiovascular: Negative for chest pain  Gastrointestinal: Negative for constipation, diarrhea, nausea and vomiting  Musculoskeletal: Positive for joint swelling and neck pain  Negative for arthralgias, gait problem and myalgias  Skin: Negative for rash  Neurological: Negative for dizziness, seizures and weakness  All other systems reviewed and are negative        Patient Active Problem List   Diagnosis    Hypothyroidism    Abnormal MRI of head    Hearing loss    Headache, variant migraine    Nasal septal deviation    Gastroesophageal reflux disease    Nasal congestion    Bee allergy status    Colon polyp    History of sudden hearing loss    Strain of left groin    Dyslipidemia       Past Medical History:   Diagnosis Date    Cluster headache        Past Surgical History:   Procedure Laterality Date    ANKLE SURGERY Left 03/20    HERNIA REPAIR Left 09/2014    Sports Hernia Repair    WRIST SURGERY Right 03/2009    Dr Michael Joyner Right 02/07/2020       Family History   Problem Relation Age of Onset    Hypothyroidism Mother     Migraines Mother     Hyperlipidemia Father         Pure    Stomach cancer Paternal Grandmother 80    Ovarian cancer Maternal Grandmother 48        over age 48   Monica Diones No Known Problems Sister     Lymphoma Maternal Grandfather 80    No Known Problems Paternal Grandfather     Prostate cancer Maternal Uncle     Prostate cancer Paternal Uncle        Social History     Occupational History    Not on file   Tobacco Use    Smoking status: Never Smoker    Smokeless tobacco: Never Used   Substance and Sexual Activity    Alcohol use: Yes     Comment: Social     Drug use: No    Sexual activity: Not on file       Current Outpatient Medications on File Prior to Visit   Medication Sig    EPINEPHrine (EpiPen 2-Sukhdev) 0 3 mg/0 3 mL SOAJ Inject 0 3 mL (0 3 mg total) into a muscle as needed for anaphylaxis    famotidine (PEPCID) 40 MG tablet TAKE 1 TABLET BY MOUTH EVERY DAY    levothyroxine 75 mcg tablet TAKE 1 TABLET BY MOUTH EVERY DAY    LO LOESTRIN FE 1 MG-10 MCG / 10 MCG TABS Take 1 tablet by mouth daily      Loratadine (CLARITIN) 10 MG CAPS Take by mouth    multivitamin (THERAGRAN) TABS Take 1 tablet by mouth daily    nabumetone (RELAFEN) 500 mg tablet Take 1 tablet twice a day after food as needed for groin pain    SUMAtriptan (IMITREX) 25 mg tablet TAKE 1 TAB AT ONSET OF MIGRAINE, MAY REPEAT X 1 IN 2HRS IF NEEDED    SUMAtriptan (IMITREX) 50 mg tablet TAKE 1 TABLET AT THE ONSET OF A MIGRAINE, MAY REPEAT IN 2 HOURS IF NEEDED     No current facility-administered medications on file prior to visit  Allergies   Allergen Reactions    Codeine      Other reaction(s): Palpitations  Reaction Date: 03Aug2011;     Molds & Smuts        Physical Exam:    /72   Pulse 86   Wt 45 8 kg (101 lb)   BMI 19 24 kg/m²     Constitutional:normal, well developed, well nourished, alert, in no distress and non-toxic and no overt pain behavior    Eyes:anicteric  HEENT:grossly intact  Neck:supple, symmetric, trachea midline and no masses   Pulmonary:even and unlabored  Cardiovascular:No edema or pitting edema present  Skin:Normal without rashes or lesions and well hydrated  Psychiatric:Mood and affect appropriate  Neurologic:Cranial Nerves II-XII grossly intact  Musculoskeletal:normal     Cervical Spine Exam  Appearance:  Normal lordosis  Palpation/Tenderness:  right cervical paraspinal tenderness  right trapezium tenderness  Range of Motion:  Flexion:  Minimally limited  with pain  Extension:  Minimally limited  with pain  Rotation - Left:  No limitation  without pain  Rotation - Right:  Minimally limited  with pain  Motor Strength:  Left Arm Flexion  5/5  Left Arm Extension  5/5  Right Arm Flexion  5/5  Right Arm Extension  5/5  Left Wrist Flexion  5/5  Left Wrist Extension  5/5  Left Finger Abduction  5/5  Right Finger Abduction  5/5  Left Pincer Grasp  5/5  Right Pincer Grasp  5/5 HT 62 inches,  pounds,  pounds, BMI 22.0,  pounds.  Dxd with Peritonitis, hemoperitoneum, anemia  Skin intact, generalized mild muscle loss related to hospitalization and decreased po intake, wt loss.

## 2021-06-08 ENCOUNTER — APPOINTMENT (OUTPATIENT)
Dept: GASTROENTEROLOGY | Facility: HOSPITAL | Age: 61
End: 2021-06-08

## 2021-06-14 ENCOUNTER — OUTPATIENT (OUTPATIENT)
Dept: OUTPATIENT SERVICES | Facility: HOSPITAL | Age: 61
LOS: 1 days | Discharge: HOME | End: 2021-06-14

## 2021-06-14 ENCOUNTER — OUTPATIENT (OUTPATIENT)
Dept: OUTPATIENT SERVICES | Facility: HOSPITAL | Age: 61
LOS: 1 days | End: 2021-06-14

## 2021-06-14 DIAGNOSIS — D75.1 SECONDARY POLYCYTHEMIA: ICD-10-CM

## 2021-06-14 DIAGNOSIS — Z99.2 DEPENDENCE ON RENAL DIALYSIS: Chronic | ICD-10-CM

## 2021-06-14 DIAGNOSIS — I77.0 ARTERIOVENOUS FISTULA, ACQUIRED: Chronic | ICD-10-CM

## 2021-06-14 DIAGNOSIS — T85.898A OTHER SPECIFIED COMPLICATION OF OTHER INTERNAL PROSTHETIC DEVICES, IMPLANTS AND GRAFTS, INITIAL ENCOUNTER: Chronic | ICD-10-CM

## 2021-06-15 ENCOUNTER — RESULT REVIEW (OUTPATIENT)
Age: 61
End: 2021-06-15

## 2021-06-15 ENCOUNTER — LABORATORY RESULT (OUTPATIENT)
Age: 61
End: 2021-06-15

## 2021-06-15 ENCOUNTER — APPOINTMENT (OUTPATIENT)
Dept: HEMATOLOGY ONCOLOGY | Facility: CLINIC | Age: 61
End: 2021-06-15
Payer: COMMERCIAL

## 2021-06-15 VITALS
RESPIRATION RATE: 18 BRPM | WEIGHT: 114.64 LBS | HEART RATE: 76 BPM | BODY MASS INDEX: 21.1 KG/M2 | HEIGHT: 61.97 IN | DIASTOLIC BLOOD PRESSURE: 83 MMHG | SYSTOLIC BLOOD PRESSURE: 128 MMHG | TEMPERATURE: 97.9 F | OXYGEN SATURATION: 100 %

## 2021-06-15 LAB
BASOPHILS # BLD AUTO: 0 K/UL — SIGNIFICANT CHANGE UP (ref 0–0.2)
BASOPHILS NFR BLD AUTO: 0 % — SIGNIFICANT CHANGE UP (ref 0–2)
EOSINOPHIL # BLD AUTO: 0.13 K/UL — SIGNIFICANT CHANGE UP (ref 0–0.5)
EOSINOPHIL NFR BLD AUTO: 2 % — SIGNIFICANT CHANGE UP (ref 0–6)
HCT VFR BLD CALC: 25.6 % — LOW (ref 34.5–45)
HGB BLD-MCNC: 7.8 G/DL — LOW (ref 11.5–15.5)
LYMPHOCYTES # BLD AUTO: 0.97 K/UL — LOW (ref 1–3.3)
LYMPHOCYTES # BLD AUTO: 15 % — SIGNIFICANT CHANGE UP (ref 13–44)
MCHC RBC-ENTMCNC: 30.5 G/DL — LOW (ref 32–36)
MCHC RBC-ENTMCNC: 31.2 PG — SIGNIFICANT CHANGE UP (ref 27–34)
MCV RBC AUTO: 102.4 FL — HIGH (ref 80–100)
MICROCYTES BLD QL: SLIGHT — SIGNIFICANT CHANGE UP
MONOCYTES # BLD AUTO: 0.19 K/UL — SIGNIFICANT CHANGE UP (ref 0–0.9)
MONOCYTES NFR BLD AUTO: 3 % — SIGNIFICANT CHANGE UP (ref 2–14)
NEUTROPHILS # BLD AUTO: 5.16 K/UL — SIGNIFICANT CHANGE UP (ref 1.8–7.4)
NEUTROPHILS NFR BLD AUTO: 80 % — HIGH (ref 43–77)
NRBC # BLD: 1 /100 — HIGH (ref 0–0)
NRBC # BLD: SIGNIFICANT CHANGE UP /100 WBCS (ref 0–0)
PLAT MORPH BLD: NORMAL — SIGNIFICANT CHANGE UP
PLATELET # BLD AUTO: 171 K/UL — SIGNIFICANT CHANGE UP (ref 150–400)
RBC # BLD: 2.5 M/UL — LOW (ref 3.8–5.2)
RBC # FLD: 21.2 % — HIGH (ref 10.3–14.5)
RBC BLD AUTO: SIGNIFICANT CHANGE UP
WBC # BLD: 6.45 K/UL — SIGNIFICANT CHANGE UP (ref 3.8–10.5)
WBC # FLD AUTO: 6.45 K/UL — SIGNIFICANT CHANGE UP (ref 3.8–10.5)

## 2021-06-15 PROCEDURE — 38222 DX BONE MARROW BX & ASPIR: CPT | Mod: LT

## 2021-06-15 NOTE — REASON FOR VISIT
[Bone Marrow Biopsy] : bone marrow biopsy [Bone Marrow Aspiration] : bone marrow aspiration [FreeTextEntry2] : h/o Jak2 + PV

## 2021-06-15 NOTE — PROCEDURE
[Bone Marrow Biopsy] : bone marrow biopsy [Bone Marrow Aspiration] : bone marrow aspiration  [Patient] : the patient [Verbal Consent Obtained] : verbal consent was obtained prior to the procedure [Patient identification verified] : patient identification verified [Procedure verified and consent obtained] : procedure verified and consent obtained [Laterality verified and correct site marked] : laterality verified and correct site marked [Left] : site: left [Correct positioning] : correct positioning [Prone] : prone [Superior iliac spine was identified] : the superior iliac spine was identified. [The left posterior iliac crest was prepped with betadine and draped, using sterile technique.] : The left posterior iliac crest was prepped with betadine and draped, using sterile technique. [Lidocaine was injected and into the periosteum overlying the site.] : Lidocaine was injected and into the periosteum overlying the site. [Aspirate] : aspirate [Cytogenetics] : cytogenetics [FISH] : FISH [Biopsy] : biopsy [Flow Cytometry] : flow cytometry [] : The patient was instructed to remove the bandage the following AM. The patient may bathe. Acetaminophen may be taken for discomfort, as per package directions.If there are any other problems, the patient was instructed to call the office. The patient verbalized understanding, and is aware of the office contact numbers. [FreeTextEntry1] : h/o JAK2 + PV [FreeTextEntry2] : 10 cc of lidocaine was used for the procedure. \par \par WBC: 6.45\par Hgb: 7.8\par Hct: 25.6\par Plts: 171\par \par Bone marrow aspiration and biopsy were done. MPD panel sent.\par \par

## 2021-06-16 ENCOUNTER — LABORATORY RESULT (OUTPATIENT)
Age: 61
End: 2021-06-16

## 2021-06-21 ENCOUNTER — NON-APPOINTMENT (OUTPATIENT)
Age: 61
End: 2021-06-21

## 2021-06-23 ENCOUNTER — NON-APPOINTMENT (OUTPATIENT)
Age: 61
End: 2021-06-23

## 2021-06-29 ENCOUNTER — APPOINTMENT (OUTPATIENT)
Dept: ENDOVASCULAR SURGERY | Facility: CLINIC | Age: 61
End: 2021-06-29
Payer: MEDICAID

## 2021-06-30 ENCOUNTER — APPOINTMENT (OUTPATIENT)
Age: 61
End: 2021-06-30

## 2021-07-03 ENCOUNTER — APPOINTMENT (OUTPATIENT)
Dept: DISASTER EMERGENCY | Facility: CLINIC | Age: 61
End: 2021-07-03

## 2021-07-04 LAB — SARS-COV-2 N GENE NPH QL NAA+PROBE: NOT DETECTED

## 2021-07-06 ENCOUNTER — RESULT REVIEW (OUTPATIENT)
Age: 61
End: 2021-07-06

## 2021-07-06 ENCOUNTER — APPOINTMENT (OUTPATIENT)
Dept: ENDOVASCULAR SURGERY | Facility: CLINIC | Age: 61
End: 2021-07-06
Payer: MEDICAID

## 2021-07-06 VITALS
HEART RATE: 85 BPM | SYSTOLIC BLOOD PRESSURE: 134 MMHG | BODY MASS INDEX: 21.52 KG/M2 | RESPIRATION RATE: 16 BRPM | DIASTOLIC BLOOD PRESSURE: 80 MMHG | TEMPERATURE: 98.2 F | HEIGHT: 61 IN | OXYGEN SATURATION: 99 % | WEIGHT: 114 LBS

## 2021-07-06 PROCEDURE — 36902Z: CUSTOM

## 2021-07-06 RX ORDER — RIVAROXABAN 10 MG/1
10 TABLET, FILM COATED ORAL DAILY
Qty: 14 | Refills: 0 | Status: DISCONTINUED | COMMUNITY
Start: 2021-04-08 | End: 2021-07-06

## 2021-07-14 NOTE — PAST MEDICAL HISTORY
[FreeTextEntry1] : Malignant Hyperthermia Screening Tool and Risk of Bleeding Assessment \par Ms. MIHYEON HU denies family of unexpected death following Anesthesia or Exercise.\par Denies Family history of Malignant Hyperthermia, Muscle or Neuromuscular disorder and High Temperature  following exercise.\par \par Ms. MIHYEON HU denies history of Muscle Spasm, Dark or Chocolate- Colored and Unanticipated fever immediately following anaesthesia or serious exercise.\par Ms. MIHYEON HU also denies bleeding tendencies/Risks of Bleeding.\par

## 2021-07-14 NOTE — HISTORY OF PRESENT ILLNESS
[FreeTextEntry1] : Dr Dempsey  9/18/2017 [FreeTextEntry4] : yesterday  [FreeTextEntry5] : yesterday at 7pm [FreeTextEntry6] : Dr Koroma

## 2021-07-17 ENCOUNTER — INPATIENT (INPATIENT)
Facility: HOSPITAL | Age: 61
LOS: 0 days | Discharge: HOME | DRG: 864 | End: 2021-07-18
Attending: INTERNAL MEDICINE | Admitting: HOSPITALIST
Payer: MEDICAID

## 2021-07-17 VITALS
SYSTOLIC BLOOD PRESSURE: 127 MMHG | HEIGHT: 62 IN | HEART RATE: 102 BPM | TEMPERATURE: 100 F | OXYGEN SATURATION: 97 % | RESPIRATION RATE: 20 BRPM | DIASTOLIC BLOOD PRESSURE: 75 MMHG

## 2021-07-17 DIAGNOSIS — Z99.2 DEPENDENCE ON RENAL DIALYSIS: Chronic | ICD-10-CM

## 2021-07-17 DIAGNOSIS — T85.898A OTHER SPECIFIED COMPLICATION OF OTHER INTERNAL PROSTHETIC DEVICES, IMPLANTS AND GRAFTS, INITIAL ENCOUNTER: Chronic | ICD-10-CM

## 2021-07-17 DIAGNOSIS — I77.0 ARTERIOVENOUS FISTULA, ACQUIRED: Chronic | ICD-10-CM

## 2021-07-17 PROCEDURE — 93010 ELECTROCARDIOGRAM REPORT: CPT

## 2021-07-17 PROCEDURE — 99285 EMERGENCY DEPT VISIT HI MDM: CPT

## 2021-07-18 ENCOUNTER — TRANSCRIPTION ENCOUNTER (OUTPATIENT)
Age: 61
End: 2021-07-18

## 2021-07-18 VITALS
HEART RATE: 76 BPM | DIASTOLIC BLOOD PRESSURE: 68 MMHG | RESPIRATION RATE: 18 BRPM | TEMPERATURE: 99 F | SYSTOLIC BLOOD PRESSURE: 128 MMHG | OXYGEN SATURATION: 100 %

## 2021-07-18 DIAGNOSIS — R50.9 FEVER, UNSPECIFIED: ICD-10-CM

## 2021-07-18 LAB
ALBUMIN SERPL ELPH-MCNC: 4.4 G/DL — SIGNIFICANT CHANGE UP (ref 3.3–5)
ALP SERPL-CCNC: 150 U/L — HIGH (ref 40–120)
ALT FLD-CCNC: 9 U/L — LOW (ref 10–45)
ANION GAP SERPL CALC-SCNC: 16 MMOL/L — SIGNIFICANT CHANGE UP (ref 5–17)
ANISOCYTOSIS BLD QL: SLIGHT — SIGNIFICANT CHANGE UP
AST SERPL-CCNC: 21 U/L — SIGNIFICANT CHANGE UP (ref 10–40)
BASOPHILS # BLD AUTO: 0.12 K/UL — SIGNIFICANT CHANGE UP (ref 0–0.2)
BASOPHILS NFR BLD AUTO: 1.8 % — SIGNIFICANT CHANGE UP (ref 0–2)
BILIRUB SERPL-MCNC: 2.5 MG/DL — HIGH (ref 0.2–1.2)
BUN SERPL-MCNC: 30 MG/DL — HIGH (ref 7–23)
CALCIUM SERPL-MCNC: 8.7 MG/DL — SIGNIFICANT CHANGE UP (ref 8.4–10.5)
CHLORIDE SERPL-SCNC: 94 MMOL/L — LOW (ref 96–108)
CO2 SERPL-SCNC: 21 MMOL/L — LOW (ref 22–31)
CREAT SERPL-MCNC: 4.78 MG/DL — HIGH (ref 0.5–1.3)
CRP SERPL-MCNC: 5 MG/L — HIGH (ref 0–4)
DACRYOCYTES BLD QL SMEAR: SLIGHT — SIGNIFICANT CHANGE UP
ELLIPTOCYTES BLD QL SMEAR: SIGNIFICANT CHANGE UP
EOSINOPHIL # BLD AUTO: 0.17 K/UL — SIGNIFICANT CHANGE UP (ref 0–0.5)
EOSINOPHIL NFR BLD AUTO: 2.6 % — SIGNIFICANT CHANGE UP (ref 0–6)
ERYTHROCYTE [SEDIMENTATION RATE] IN BLOOD: 31 MM/HR — HIGH (ref 0–20)
GLUCOSE SERPL-MCNC: 118 MG/DL — HIGH (ref 70–99)
HCT VFR BLD CALC: 26.1 % — LOW (ref 34.5–45)
HGB BLD-MCNC: 8 G/DL — LOW (ref 11.5–15.5)
LIDOCAIN IGE QN: 115 U/L — HIGH (ref 7–60)
LYMPHOCYTES # BLD AUTO: 0.74 K/UL — LOW (ref 1–3.3)
LYMPHOCYTES # BLD AUTO: 11.4 % — LOW (ref 13–44)
MACROCYTES BLD QL: SLIGHT — SIGNIFICANT CHANGE UP
MAGNESIUM SERPL-MCNC: 2.2 MG/DL — SIGNIFICANT CHANGE UP (ref 1.6–2.6)
MANUAL SMEAR VERIFICATION: SIGNIFICANT CHANGE UP
MCHC RBC-ENTMCNC: 30.7 GM/DL — LOW (ref 32–36)
MCHC RBC-ENTMCNC: 31.6 PG — SIGNIFICANT CHANGE UP (ref 27–34)
MCV RBC AUTO: 103.2 FL — HIGH (ref 80–100)
MONOCYTES # BLD AUTO: 0.17 K/UL — SIGNIFICANT CHANGE UP (ref 0–0.9)
MONOCYTES NFR BLD AUTO: 2.6 % — SIGNIFICANT CHANGE UP (ref 2–14)
NEUTROPHILS # BLD AUTO: 5.26 K/UL — SIGNIFICANT CHANGE UP (ref 1.8–7.4)
NEUTROPHILS NFR BLD AUTO: 79 % — HIGH (ref 43–77)
NEUTS BAND # BLD: 2.6 % — SIGNIFICANT CHANGE UP (ref 0–8)
NRBC # BLD: 1 /100 — HIGH (ref 0–0)
OVALOCYTES BLD QL SMEAR: SLIGHT — SIGNIFICANT CHANGE UP
PHOSPHATE SERPL-MCNC: 3.8 MG/DL — SIGNIFICANT CHANGE UP (ref 2.5–4.5)
PLAT MORPH BLD: NORMAL — SIGNIFICANT CHANGE UP
PLATELET # BLD AUTO: 198 K/UL — SIGNIFICANT CHANGE UP (ref 150–400)
POIKILOCYTOSIS BLD QL AUTO: SIGNIFICANT CHANGE UP
POLYCHROMASIA BLD QL SMEAR: SLIGHT — SIGNIFICANT CHANGE UP
POTASSIUM SERPL-MCNC: 4.9 MMOL/L — SIGNIFICANT CHANGE UP (ref 3.5–5.3)
POTASSIUM SERPL-SCNC: 4.9 MMOL/L — SIGNIFICANT CHANGE UP (ref 3.5–5.3)
PROT SERPL-MCNC: 8.4 G/DL — HIGH (ref 6–8.3)
RBC # BLD: 2.53 M/UL — LOW (ref 3.8–5.2)
RBC # FLD: 20.6 % — HIGH (ref 10.3–14.5)
RBC BLD AUTO: ABNORMAL
SARS-COV-2 RNA SPEC QL NAA+PROBE: SIGNIFICANT CHANGE UP
SODIUM SERPL-SCNC: 131 MMOL/L — LOW (ref 135–145)
WBC # BLD: 6.45 K/UL — SIGNIFICANT CHANGE UP (ref 3.8–10.5)
WBC # FLD AUTO: 6.45 K/UL — SIGNIFICANT CHANGE UP (ref 3.8–10.5)

## 2021-07-18 PROCEDURE — 99285 EMERGENCY DEPT VISIT HI MDM: CPT | Mod: 25

## 2021-07-18 PROCEDURE — 87635 SARS-COV-2 COVID-19 AMP PRB: CPT

## 2021-07-18 PROCEDURE — 83735 ASSAY OF MAGNESIUM: CPT

## 2021-07-18 PROCEDURE — 71046 X-RAY EXAM CHEST 2 VIEWS: CPT

## 2021-07-18 PROCEDURE — 80053 COMPREHEN METABOLIC PANEL: CPT

## 2021-07-18 PROCEDURE — 74178 CT ABD&PLV WO CNTR FLWD CNTR: CPT | Mod: 26

## 2021-07-18 PROCEDURE — 85025 COMPLETE CBC W/AUTO DIFF WBC: CPT

## 2021-07-18 PROCEDURE — 74176 CT ABD & PELVIS W/O CONTRAST: CPT

## 2021-07-18 PROCEDURE — 83690 ASSAY OF LIPASE: CPT

## 2021-07-18 PROCEDURE — 71046 X-RAY EXAM CHEST 2 VIEWS: CPT | Mod: 26

## 2021-07-18 PROCEDURE — 84100 ASSAY OF PHOSPHORUS: CPT

## 2021-07-18 PROCEDURE — 93005 ELECTROCARDIOGRAM TRACING: CPT

## 2021-07-18 PROCEDURE — 85652 RBC SED RATE AUTOMATED: CPT

## 2021-07-18 PROCEDURE — 74177 CT ABD & PELVIS W/CONTRAST: CPT

## 2021-07-18 PROCEDURE — 36415 COLL VENOUS BLD VENIPUNCTURE: CPT

## 2021-07-18 PROCEDURE — 86140 C-REACTIVE PROTEIN: CPT

## 2021-07-18 RX ORDER — ACETAMINOPHEN 500 MG
650 TABLET ORAL EVERY 6 HOURS
Refills: 0 | Status: DISCONTINUED | OUTPATIENT
Start: 2021-07-18 | End: 2021-07-18

## 2021-07-18 RX ORDER — ASPIRIN/CALCIUM CARB/MAGNESIUM 324 MG
81 TABLET ORAL DAILY
Refills: 0 | Status: DISCONTINUED | OUTPATIENT
Start: 2021-07-18 | End: 2021-07-18

## 2021-07-18 RX ORDER — LANOLIN ALCOHOL/MO/W.PET/CERES
3 CREAM (GRAM) TOPICAL AT BEDTIME
Refills: 0 | Status: DISCONTINUED | OUTPATIENT
Start: 2021-07-18 | End: 2021-07-18

## 2021-07-18 RX ORDER — ONDANSETRON 8 MG/1
4 TABLET, FILM COATED ORAL EVERY 8 HOURS
Refills: 0 | Status: DISCONTINUED | OUTPATIENT
Start: 2021-07-18 | End: 2021-07-18

## 2021-07-18 RX ORDER — SODIUM CHLORIDE 9 MG/ML
3 INJECTION INTRAMUSCULAR; INTRAVENOUS; SUBCUTANEOUS EVERY 8 HOURS
Refills: 0 | Status: DISCONTINUED | OUTPATIENT
Start: 2021-07-18 | End: 2021-07-18

## 2021-07-18 RX ORDER — LANOLIN ALCOHOL/MO/W.PET/CERES
1 CREAM (GRAM) TOPICAL
Qty: 0 | Refills: 0 | DISCHARGE
Start: 2021-07-18

## 2021-07-18 RX ADMIN — SODIUM CHLORIDE 3 MILLILITER(S): 9 INJECTION INTRAMUSCULAR; INTRAVENOUS; SUBCUTANEOUS at 13:11

## 2021-07-18 NOTE — ED PROVIDER NOTE - ATTENDING CONTRIBUTION TO CARE
attending Gerardo: 60yF h/o ESRD on HD, polycythemia with bone marrow complications, liver cirrhosis, splenomegaly p/w fever since yesterday after receiving Pfizer covid vaccine yesterday. Tmax 202 with complaint of generalized weakness and abdominal pain. Abdomen diffusely tender on exam. Will obtain labs, cxr, CT A/P eval for source of fever, reassess

## 2021-07-18 NOTE — DISCHARGE NOTE NURSING/CASE MANAGEMENT/SOCIAL WORK - PATIENT PORTAL LINK FT
You can access the FollowMyHealth Patient Portal offered by [facility] by registering at the following website: [email or web address]. By joining [employer]’s FollowMyHealth portal, you will also be able to view your health information using other applications (apps) compatible with our system.

## 2021-07-18 NOTE — DISCHARGE NOTE PROVIDER - HOSPITAL COURSE
60y Female with pmhx of ESRD on hemodialysis, polycythemia with bone marrow complications, liver cirrhosis complaining of fever since yesterday. Pt said she got Pfizer covid vaccine yesterday 07/16/21. Subsequently developed fever with Tmax of 101.2 along with dizziness and generalized weakness. Pt has HD on MWF, last session was completed yesterday. Also had L sided CP earlier today that resolved on its own. Has had these episodes of CP on and off for the last month. NKDA    Pt has PMHx:   Cirrhosis of liver without ascites, unspecified hepatic cirrhosis type    ESRD on peritoneal dialysis    Hypertension    Pancreatic cyst    Peptic ulcer disease    Polycythemia vera    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  Splenomegaly  2015.               8.0    6.45  )-----------( 198      ( 07-18 @ 01:40 )             26.1     ICU Vital Signs Last 24 Hrs  T(C): 37 (18 Jul 2021 10:27), Max: 37.8 (17 Jul 2021 23:16)  T(F): 98.6 (18 Jul 2021 10:27), Max: 100.1 (17 Jul 2021 23:16)  HR: 76 (18 Jul 2021 10:27) (76 - 102)  BP: 128/68 (18 Jul 2021 10:27) (115/70 - 128/68)  BP(mean): --  ABP: --  ABP(mean): --  RR: 18 (18 Jul 2021 10:27) (16 - 20)  SpO2: 100% (18 Jul 2021 10:27) (96% - 100%)

## 2021-07-18 NOTE — ED PROVIDER NOTE - CLINICAL SUMMARY MEDICAL DECISION MAKING FREE TEXT BOX
Hosea Gonzalez MD (PGY-2): The patient is a 60y Female with pmhx of ESRD on hemodialysis, polycythemia with bone marrow complications, liver cirrhosis complaining of fever since yesterday. DDx includes appendicitis, diverticulitis, viral syndrome. Will get labs, CTAP without contrast, cxr, ekg. Pt will most likely be admitted to Medicine.

## 2021-07-18 NOTE — DISCHARGE NOTE PROVIDER - PROVIDER TOKENS
FREE:[LAST:[primary care provider],PHONE:[(   )    -],FAX:[(   )    -],ADDRESS:[Please follow up with your primary care provider]]

## 2021-07-18 NOTE — DISCHARGE NOTE PROVIDER - NSDCCPCAREPLAN_GEN_ALL_CORE_FT
PRINCIPAL DISCHARGE DIAGNOSIS  Diagnosis: Fever, unknown origin  Assessment and Plan of Treatment: resovled

## 2021-07-18 NOTE — ED ADULT NURSE REASSESSMENT NOTE - NS ED NURSE REASSESS COMMENT FT1
Report received from Dany BANKS 07.00 AM. Pt is well appearing, AAOx4, resp nonlabored, skin warm/dry, strong upper and lower extremities steady gait, resting comfortably in bed at this time. Pt denies headache, dizziness, chest pain, palpitations, SOB, abd pain, n/v/d, urinary symptoms, fevers, chills, weakness at this time.  Pt awaiting for a bed assignment. Comfort care & safety measures continued  IV site looks clean & dry no signs of infiltration noted pt denies  pain IV site . Pt is advised to call for help  call bell with in the reach pt verbalized the understanding.  Continue to monitor..

## 2021-07-18 NOTE — ED PROVIDER NOTE - NS ED ROS FT
GENERAL: +fever  EYES: No change in vision  HEENT: No trouble swallowing or speaking  CARDIAC: +chest pain  PULMONARY: No cough or SOB  GI: No abdominal pain, no nausea or no vomiting, no diarrhea or constipation  : No changes in urination  SKIN: No rashes  NEURO: +generalized weakness and dizziness; no headache, no numbness  MSK: No joint pain  Otherwise as HPI or negative.

## 2021-07-18 NOTE — DISCHARGE NOTE PROVIDER - NSDCMRMEDTOKEN_GEN_ALL_CORE_FT
calcium acetate 667 mg oral capsule: 2 tab(s) orally 3 times a day  melatonin 3 mg oral tablet: 1 tab(s) orally once a day (at bedtime), As needed, Insomnia  Pepcid: 10 milligram(s) orally once a day  propranolol 10 mg oral tablet: 1 tab(s) orally once a day

## 2021-07-18 NOTE — DISCHARGE NOTE NURSING/CASE MANAGEMENT/SOCIAL WORK - NSDCVIVACCINE_GEN_ALL_CORE_FT
influenza, injectable, quadrivalent, preservative free; 20-Nov-2014 12:44; Aston Ulloa (RN); [employer]; [id number]; IntraMuscular; Deltoid Left.; 0.5 milliLiter(s);   influenza, injectable, quadrivalent, preservative free; 05-Oct-2016 18:15; Rosario James (RN); [employer]; [id number]; IntraMuscular; Deltoid Left.; 0.5 milliLiter(s); VIS (VIS Published: 07-Aug-2015, VIS Presented: 05-Oct-2016);

## 2021-07-18 NOTE — DISCHARGE NOTE PROVIDER - NSDCFUADDINST_GEN_ALL_CORE_FT
d/c planning for today with all instructions verbally explained to patient.  All questions answered.  All MD phone numbers given to patient.  All discharge instructions are also on the d/c papers given to pt by RN. Tylenol 650mg every 6 hours as needed for pain.   Tylenol can be obtained over the counter. Do not take more than 4000mg of Tylenol in any 24 hour period.

## 2021-07-18 NOTE — ED PROVIDER NOTE - OBJECTIVE STATEMENT
The patient is a 60y Female with pmhx of ESRD on hemodialysis, polycythemia with bone marrow complications, liver cirrhosis complaining of fever since yesterday. Pt said she got Pfizer covid vaccine yesterday 07/16/21. Subsequently developed fever with Tmax of 101.2 along with dizziness and generalized weakness. Pt has HD on MWF, last session was completed yesterday. Also had L sided CP earlier today that resolved on its own. Has had these episodes of CP on and off for the last month. NKDA.

## 2021-07-18 NOTE — ED PROVIDER NOTE - PHYSICAL EXAMINATION
Gen: In mild distress. A&Ox4. Non-toxic appearing.  HEENT: Normocephalic and atraumatic. PERRL, EOMI, no nasal discharge, mucous membranes moist, no scleral icterus.  CV: Regular rate and rhythm, +S1/S2, no M/R/G. No significant lower extremity edema. Radial and DP pulses present and symmetrical. Capillary refill less than 2 seconds.  Resp: Normal effort and rate. CTAB, no rales, rhonchi, or wheezes.  GI: Abdomen soft, non-distended, TTP diffusely (worst in LLQ). No masses appreciated. Bowel sounds present.  MSK: No open wounds and no bruising. No CVA tenderness bilaterally.  Neuro: Following commands, speaking in full sentences, moving extremities spontaneously  Psych: Appropriate mood, cooperative

## 2021-07-18 NOTE — DISCHARGE NOTE PROVIDER - CARE PROVIDER_API CALL
primary care provider,   Please follow up with your primary care provider  Phone: (   )    -  Fax: (   )    -  Follow Up Time:

## 2021-07-18 NOTE — DISCHARGE NOTE PROVIDER - NSDCFUSCHEDAPPT_GEN_ALL_CORE_FT
HU, MIHYEON ; 07/26/2021 ; NPP [facility]  HU, MIHYEON ; 10/05/2021 ; NPP Surg [facility] [address] HU, MIHYEON ; 10/05/2021 ; NPP Surg Vasc [address]

## 2021-07-18 NOTE — ED PROVIDER NOTE - PROGRESS NOTE DETAILS
Hosea Gonzalez MD (PGY-2): CTAP with IV contrast is unremarkable. Pt still has diffuse abd tenderness (worst in LLQ). Will admit to Medicine. Hosea Gonzalez MD (PGY-2): CTAP without contrast shows no acute pathology. Pt still has diffuse abd tenderness. Will get CTAP with IV contrast, since pt will be dialyzed tomorrow.

## 2021-07-18 NOTE — ED ADULT NURSE NOTE - OBJECTIVE STATEMENT
patient is a 60 year old female with a PMH of ESRD, HTN who presents to the ED from home s/p first pfizer vaccine yesterday complaining of fever weakness. pt on dialysis (M/W/F). Patient states she woke up today and felt "like I had a fever and just not right" patient states "I came to the emergency room but I don't want to be here anymore" patient febrile in ED. patient is aaox3, lungs CTA bilaterally, abd soft, nondistended, nontender, cap refill <3, radial pulses +2 bilaterally. patient denies chest pain, shortness of breath, ha, dizziness, n/v/d, abd pain, back pain, changes in bowel or bladder, hematuria, bloody stool. patient resting on stretcher. IV placed. MD to see.

## 2021-07-21 ENCOUNTER — OUTPATIENT (OUTPATIENT)
Dept: OUTPATIENT SERVICES | Facility: HOSPITAL | Age: 61
LOS: 1 days | Discharge: HOME | End: 2021-07-21

## 2021-07-21 DIAGNOSIS — Z99.2 DEPENDENCE ON RENAL DIALYSIS: Chronic | ICD-10-CM

## 2021-07-21 DIAGNOSIS — T85.898A OTHER SPECIFIED COMPLICATION OF OTHER INTERNAL PROSTHETIC DEVICES, IMPLANTS AND GRAFTS, INITIAL ENCOUNTER: Chronic | ICD-10-CM

## 2021-07-21 DIAGNOSIS — D75.1 SECONDARY POLYCYTHEMIA: ICD-10-CM

## 2021-07-21 DIAGNOSIS — I77.0 ARTERIOVENOUS FISTULA, ACQUIRED: Chronic | ICD-10-CM

## 2021-07-23 ENCOUNTER — NON-APPOINTMENT (OUTPATIENT)
Age: 61
End: 2021-07-23

## 2021-07-26 ENCOUNTER — INPATIENT (INPATIENT)
Facility: HOSPITAL | Age: 61
LOS: 4 days | Discharge: HOME | DRG: 840 | End: 2021-07-31
Attending: INTERNAL MEDICINE | Admitting: INTERNAL MEDICINE
Payer: MEDICAID

## 2021-07-26 ENCOUNTER — APPOINTMENT (OUTPATIENT)
Dept: HEMATOLOGY ONCOLOGY | Facility: CLINIC | Age: 61
End: 2021-07-26
Payer: COMMERCIAL

## 2021-07-26 VITALS
DIASTOLIC BLOOD PRESSURE: 75 MMHG | HEART RATE: 75 BPM | OXYGEN SATURATION: 99 % | RESPIRATION RATE: 20 BRPM | SYSTOLIC BLOOD PRESSURE: 127 MMHG | TEMPERATURE: 98 F | HEIGHT: 62 IN | WEIGHT: 114.64 LBS

## 2021-07-26 DIAGNOSIS — I77.0 ARTERIOVENOUS FISTULA, ACQUIRED: Chronic | ICD-10-CM

## 2021-07-26 DIAGNOSIS — Z99.2 DEPENDENCE ON RENAL DIALYSIS: Chronic | ICD-10-CM

## 2021-07-26 DIAGNOSIS — T85.898A OTHER SPECIFIED COMPLICATION OF OTHER INTERNAL PROSTHETIC DEVICES, IMPLANTS AND GRAFTS, INITIAL ENCOUNTER: Chronic | ICD-10-CM

## 2021-07-26 LAB
ALBUMIN SERPL ELPH-MCNC: 4.8 G/DL — SIGNIFICANT CHANGE UP (ref 3.3–5)
ALP SERPL-CCNC: 175 U/L — HIGH (ref 40–120)
ALT FLD-CCNC: 9 U/L — LOW (ref 10–45)
ANION GAP SERPL CALC-SCNC: 15 MMOL/L — SIGNIFICANT CHANGE UP (ref 5–17)
AST SERPL-CCNC: 25 U/L — SIGNIFICANT CHANGE UP (ref 10–40)
BASOPHILS # BLD AUTO: 0.04 K/UL — SIGNIFICANT CHANGE UP (ref 0–0.2)
BASOPHILS NFR BLD AUTO: 0.6 % — SIGNIFICANT CHANGE UP (ref 0–2)
BILIRUB SERPL-MCNC: 2.2 MG/DL — HIGH (ref 0.2–1.2)
BLD GP AB SCN SERPL QL: NEGATIVE — SIGNIFICANT CHANGE UP
BUN SERPL-MCNC: 18 MG/DL — SIGNIFICANT CHANGE UP (ref 7–23)
CALCIUM SERPL-MCNC: 9.6 MG/DL — SIGNIFICANT CHANGE UP (ref 8.4–10.5)
CHLORIDE SERPL-SCNC: 96 MMOL/L — SIGNIFICANT CHANGE UP (ref 96–108)
CO2 SERPL-SCNC: 26 MMOL/L — SIGNIFICANT CHANGE UP (ref 22–31)
CREAT SERPL-MCNC: 2.68 MG/DL — HIGH (ref 0.5–1.3)
EOSINOPHIL # BLD AUTO: 0.1 K/UL — SIGNIFICANT CHANGE UP (ref 0–0.5)
EOSINOPHIL NFR BLD AUTO: 1.5 % — SIGNIFICANT CHANGE UP (ref 0–6)
GLUCOSE SERPL-MCNC: 124 MG/DL — HIGH (ref 70–99)
HCT VFR BLD CALC: 22.6 % — LOW (ref 34.5–45)
HGB BLD-MCNC: 7.1 G/DL — LOW (ref 11.5–15.5)
IMM GRANULOCYTES NFR BLD AUTO: 1.5 % — SIGNIFICANT CHANGE UP (ref 0–1.5)
LYMPHOCYTES # BLD AUTO: 1.08 K/UL — SIGNIFICANT CHANGE UP (ref 1–3.3)
LYMPHOCYTES # BLD AUTO: 16.3 % — SIGNIFICANT CHANGE UP (ref 13–44)
MCHC RBC-ENTMCNC: 31.4 GM/DL — LOW (ref 32–36)
MCHC RBC-ENTMCNC: 31.7 PG — SIGNIFICANT CHANGE UP (ref 27–34)
MCV RBC AUTO: 100.9 FL — HIGH (ref 80–100)
MONOCYTES # BLD AUTO: 0.23 K/UL — SIGNIFICANT CHANGE UP (ref 0–0.9)
MONOCYTES NFR BLD AUTO: 3.5 % — SIGNIFICANT CHANGE UP (ref 2–14)
NEUTROPHILS # BLD AUTO: 5.06 K/UL — SIGNIFICANT CHANGE UP (ref 1.8–7.4)
NEUTROPHILS NFR BLD AUTO: 76.6 % — SIGNIFICANT CHANGE UP (ref 43–77)
NRBC # BLD: 0 /100 WBCS — SIGNIFICANT CHANGE UP (ref 0–0)
PLATELET # BLD AUTO: 199 K/UL — SIGNIFICANT CHANGE UP (ref 150–400)
POTASSIUM SERPL-MCNC: 4 MMOL/L — SIGNIFICANT CHANGE UP (ref 3.5–5.3)
POTASSIUM SERPL-SCNC: 4 MMOL/L — SIGNIFICANT CHANGE UP (ref 3.5–5.3)
PROT SERPL-MCNC: 8.7 G/DL — HIGH (ref 6–8.3)
RBC # BLD: 2.24 M/UL — LOW (ref 3.8–5.2)
RBC # FLD: 19.9 % — HIGH (ref 10.3–14.5)
RH IG SCN BLD-IMP: POSITIVE — SIGNIFICANT CHANGE UP
SODIUM SERPL-SCNC: 137 MMOL/L — SIGNIFICANT CHANGE UP (ref 135–145)
WBC # BLD: 6.61 K/UL — SIGNIFICANT CHANGE UP (ref 3.8–10.5)
WBC # FLD AUTO: 6.61 K/UL — SIGNIFICANT CHANGE UP (ref 3.8–10.5)

## 2021-07-26 PROCEDURE — 99442: CPT

## 2021-07-26 RX ORDER — ACETAMINOPHEN 500 MG
650 TABLET ORAL ONCE
Refills: 0 | Status: COMPLETED | OUTPATIENT
Start: 2021-07-26 | End: 2021-07-26

## 2021-07-26 RX ADMIN — Medication 650 MILLIGRAM(S): at 23:47

## 2021-07-26 NOTE — ED ADULT NURSE REASSESSMENT NOTE - NS ED NURSE REASSESS COMMENT FT1
PRBCs administered per ED MDs orders. Pt consent in chart. Risks and benefits of administering product explained to pt. Pt verbalized understanding of risks and benefits. VSS. Second RN at beside for confirmation of product and pt identification. Will continue to monitor.

## 2021-07-26 NOTE — ED PROCEDURE NOTE - ATTENDING CONTRIBUTION TO CARE
Attending MD Acosta: Risks, benefit and alternatives of procedure explained to patient and patient demonstrated verbal understanding and consent.  I was present during the key portions of the procedure. Patient tolerated procedure well without complications

## 2021-07-26 NOTE — ED PROVIDER NOTE - PHYSICAL EXAMINATION
GENERAL: NAD, well-groomed, well-developed  HEAD: Atraumatic, Normocephalic  EYES: EOMI, conjunctiva and sclera clear  ENMT: Moist mucous membranes  NECK: Supple, Normal Thyroid  NERVOUS SYSTEM: Alert & Oriented X3, Good concentration; Motor Strength 5/5 B/L upper and lower extremities  CHEST/LUNG: Clear to auscultation bilaterally; No rales, rhonchi, wheezing, or rubs  HEART: Regular rate and rhythm; S1 and S2; No murmurs, rubs, or gallops  ABDOMEN: Soft, Nontender, Nondistended: Bowel sounds present  EXTREMITIES: 2+ Peripheral Pulses, No clubbing, cyanosis, or edema, +AVF RUE w/ palpable thrill  LYMPH: No lymphadenopathy noted  SKIN: No rashes or lesions

## 2021-07-26 NOTE — ED ADULT NURSE NOTE - OBJECTIVE STATEMENT
62 yo female AAOX3 presents to ED via EMS from [facility] for low hemoglobin. As per EMS, outpt labs on Friday indicated hemoglobin of 6.9, pt also endorses dizziness and SOB. Dialysis MWF, completed dialysis treatment today, fistula to left arm, limb restriction band in place. SOB worsens with movment, non labored respirations, pulse ox 100% on room air. No CP, weakness, numbness, fevers, chills, n/v/d. Safety measures maintained with bed in low position and side rails up. 62 yo female AAOX3 presents to ED via EMS from [facility] for low hemoglobin. As per EMS, outpt labs on Friday indicated hemoglobin of 6.9, pt also endorses dizziness and SOB. Dialysis MWF, completed dialysis treatment today, fistula to left arm, limb restriction band in place. SOB worsens with movement and "when I feel nervous", non labored respirations, pulse ox 100% on room air. No CP, weakness, numbness, fevers, chills, n/v/d. Safety measures maintained with bed in low position and side rails up. 62 yo female AAOX3 presents to ED via EMS from [facility] for low hemoglobin. As per EMS, outpt labs on Friday indicated hemoglobin of 6.9, pt also endorses dizziness and SOB. Dialysis MWF, completed dialysis treatment today, fistula to left arm, limb restriction band in place. SOB worsens with movement and "when I feel nervous", non labored respirations, pulse ox 100% on room air. Patient denies any bloody stool or urine. No CP, weakness, numbness, fevers, chills, n/v/d. Safety measures maintained with bed in low position and side rails up.

## 2021-07-26 NOTE — ED PROVIDER NOTE - CLINICAL SUMMARY MEDICAL DECISION MAKING FREE TEXT BOX
61F w/ ESRD on HD (last 7/26), polycythemia vera (+JAK2) w/ progression to myelofibrosis coming to ED for anemia. Will check CBC and keep active type and screen, if pt persistently anemic, will give 1U pRBC, likely d/c with outpt followup for repeat labs. 61F w/ ESRD on HD (last 7/26), polycythemia vera (+JAK2) w/ progression to myelofibrosis coming to ED for anemia. Will check CBC and keep active type and screen, if pt persistently anemic, will give 1U pRBC, likely d/c with outpt followup for repeat labs.    Attending MD Acosta: Agree with above. 61 F hx ESRD on dialysis MWF p/w outpt labs showing Hgb 6.9. Patient mildly symptomatic, increased lethargy, malaise. Denies infectious symptoms. Physical exam unremarkable. Will obtain repeat CBC and likely transfuse 1 U PRBC. No other bleeding site suspectde.

## 2021-07-26 NOTE — ED PROVIDER NOTE - NS ED ROS FT
CONSTITUTIONAL: No weakness, fevers or chills  EYES/ENT: No visual changes  NECK: No pain or stiffness  RESPIRATORY: No cough, wheezing, hemoptysis; No shortness of breath  CARDIOVASCULAR: No chest pain or palpitations  GASTROINTESTINAL: No abdominal or epigastric pain. No nausea, vomiting, or hematemesis; No diarrhea or constipation. No melena or hematochezia.  GENITOURINARY: No dysuria  NEUROLOGICAL: No numbness or weakness, mild dizziness  SKIN: No itching, rashes

## 2021-07-26 NOTE — ED PROVIDER NOTE - PROGRESS NOTE DETAILS
Attending MD Acosta : Patient's Hgb 7.1 <- 6.9 last week. Without sig drop, doubt acute bleed. Given patient symptomatic, will transfuse 1 U and d/c to follow-up nephro for repeat labs. Sing out follow-up: Pt finished pRBCs. Pt notes LUQ pain which began in ED. No nausea. No rash. Worse with movement. +epigastric and LUQ TTP. Will obtain CT a/p and give Pepcid. ROSENDO.

## 2021-07-26 NOTE — ED PROVIDER NOTE - OBJECTIVE STATEMENT
61F w/ PMHx ESRD on HD (last today), polycythemia vera progressing to myelofibrosis, liver cirrhosis, presenting to ED for anemia. Patient was at HD today when her Hematologist called and stated bloodwork from last week showed Hgb 6.9 and to come to the ED for further eval. She is getting Epo with HD. She had a recent admission for anemia requiring blood transfusion. She adamantly denies bleeding, states that her stools are brown and have never been black or red.       PCP: Azucena De León

## 2021-07-27 ENCOUNTER — NON-APPOINTMENT (OUTPATIENT)
Age: 61
End: 2021-07-27

## 2021-07-27 DIAGNOSIS — D64.9 ANEMIA, UNSPECIFIED: ICD-10-CM

## 2021-07-27 DIAGNOSIS — I10 ESSENTIAL (PRIMARY) HYPERTENSION: ICD-10-CM

## 2021-07-27 DIAGNOSIS — D45 POLYCYTHEMIA VERA: ICD-10-CM

## 2021-07-27 DIAGNOSIS — N18.6 END STAGE RENAL DISEASE: ICD-10-CM

## 2021-07-27 DIAGNOSIS — Z29.9 ENCOUNTER FOR PROPHYLACTIC MEASURES, UNSPECIFIED: ICD-10-CM

## 2021-07-27 DIAGNOSIS — D73.5 INFARCTION OF SPLEEN: ICD-10-CM

## 2021-07-27 DIAGNOSIS — R10.11 RIGHT UPPER QUADRANT PAIN: ICD-10-CM

## 2021-07-27 LAB — SARS-COV-2 RNA SPEC QL NAA+PROBE: SIGNIFICANT CHANGE UP

## 2021-07-27 PROCEDURE — 99223 1ST HOSP IP/OBS HIGH 75: CPT

## 2021-07-27 PROCEDURE — 74176 CT ABD & PELVIS W/O CONTRAST: CPT | Mod: 26,MA

## 2021-07-27 RX ORDER — MORPHINE SULFATE 50 MG/1
4 CAPSULE, EXTENDED RELEASE ORAL ONCE
Refills: 0 | Status: DISCONTINUED | OUTPATIENT
Start: 2021-07-27 | End: 2021-07-27

## 2021-07-27 RX ORDER — ONDANSETRON 8 MG/1
4 TABLET, FILM COATED ORAL EVERY 8 HOURS
Refills: 0 | Status: DISCONTINUED | OUTPATIENT
Start: 2021-07-27 | End: 2021-07-31

## 2021-07-27 RX ORDER — CALCIUM ACETATE 667 MG
1334 TABLET ORAL
Refills: 0 | Status: DISCONTINUED | OUTPATIENT
Start: 2021-07-27 | End: 2021-07-31

## 2021-07-27 RX ORDER — MORPHINE SULFATE 50 MG/1
1 CAPSULE, EXTENDED RELEASE ORAL ONCE
Refills: 0 | Status: DISCONTINUED | OUTPATIENT
Start: 2021-07-27 | End: 2021-07-27

## 2021-07-27 RX ORDER — FAMOTIDINE 10 MG/ML
20 INJECTION INTRAVENOUS ONCE
Refills: 0 | Status: COMPLETED | OUTPATIENT
Start: 2021-07-27 | End: 2021-07-27

## 2021-07-27 RX ORDER — FAMOTIDINE 10 MG/ML
10 INJECTION INTRAVENOUS
Qty: 0 | Refills: 0 | DISCHARGE

## 2021-07-27 RX ORDER — CHLORHEXIDINE GLUCONATE 213 G/1000ML
1 SOLUTION TOPICAL DAILY
Refills: 0 | Status: DISCONTINUED | OUTPATIENT
Start: 2021-07-27 | End: 2021-07-31

## 2021-07-27 RX ORDER — LANOLIN ALCOHOL/MO/W.PET/CERES
3 CREAM (GRAM) TOPICAL AT BEDTIME
Refills: 0 | Status: DISCONTINUED | OUTPATIENT
Start: 2021-07-27 | End: 2021-07-31

## 2021-07-27 RX ORDER — ACETAMINOPHEN 500 MG
650 TABLET ORAL EVERY 6 HOURS
Refills: 0 | Status: DISCONTINUED | OUTPATIENT
Start: 2021-07-27 | End: 2021-07-31

## 2021-07-27 RX ADMIN — Medication 1 TABLET(S): at 11:06

## 2021-07-27 RX ADMIN — Medication 30 MILLILITER(S): at 20:08

## 2021-07-27 RX ADMIN — Medication 1334 MILLIGRAM(S): at 11:19

## 2021-07-27 RX ADMIN — MORPHINE SULFATE 4 MILLIGRAM(S): 50 CAPSULE, EXTENDED RELEASE ORAL at 04:45

## 2021-07-27 RX ADMIN — Medication 650 MILLIGRAM(S): at 04:10

## 2021-07-27 RX ADMIN — MORPHINE SULFATE 1 MILLIGRAM(S): 50 CAPSULE, EXTENDED RELEASE ORAL at 11:15

## 2021-07-27 RX ADMIN — FAMOTIDINE 20 MILLIGRAM(S): 10 INJECTION INTRAVENOUS at 03:36

## 2021-07-27 RX ADMIN — MORPHINE SULFATE 1 MILLIGRAM(S): 50 CAPSULE, EXTENDED RELEASE ORAL at 12:00

## 2021-07-27 RX ADMIN — Medication 1334 MILLIGRAM(S): at 18:20

## 2021-07-27 RX ADMIN — ONDANSETRON 4 MILLIGRAM(S): 8 TABLET, FILM COATED ORAL at 20:08

## 2021-07-27 RX ADMIN — MORPHINE SULFATE 4 MILLIGRAM(S): 50 CAPSULE, EXTENDED RELEASE ORAL at 04:11

## 2021-07-27 NOTE — H&P ADULT - NSICDXPASTMEDICALHX_GEN_ALL_CORE_FT
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

## 2021-07-27 NOTE — PATIENT PROFILE ADULT - BILL PAYMENT
Labs today    Stop the dicyclomine.  Start the levsin instead    Start daily Align or other probiotic    For any additional questions, concerns or changes to your condition after today's office visit please contact the office at [phone].  
no

## 2021-07-27 NOTE — H&P ADULT - PROBLEM SELECTOR PLAN 1
with secondary myelofibrosis  - cont jakafi - patient needs to bring her own from home (non-formulary)

## 2021-07-27 NOTE — H&P ADULT - PROBLEM SELECTOR PLAN 2
due to myelofibrosis  monitor hgb daily  cont toshia  emailed Dr. Jacky Guo about admission  monitor for bleeding  B12/folate/ferritin were last checked 4/2021, all were wnl due to myelofibrosis  monitor hgb daily  cont jakafi - patient has her own supply of 4 pills with her, informed her to give to the nurse so that we can administer the med to her  emailed Dr. Jacky Guo about admission  monitor for bleeding  B12/folate/ferritin were last checked 4/2021, all were wnl
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
## 2021-07-27 NOTE — H&P ADULT - NSHPPHYSICALEXAM_GEN_ALL_CORE
Vital Signs Last 24 Hrs  T(C): 36.6 (27 Jul 2021 05:45), Max: 37.1 (26 Jul 2021 22:30)  T(F): 97.9 (27 Jul 2021 05:45), Max: 98.7 (26 Jul 2021 22:30)  HR: 72 (27 Jul 2021 08:46) (72 - 93)  BP: 132/77 (27 Jul 2021 08:46) (127/75 - 154/71)  BP(mean): 7 (27 Jul 2021 05:45) (7 - 99)  RR: 16 (27 Jul 2021 08:46) (16 - 21)  SpO2: 99% (27 Jul 2021 08:46) (96% - 100%)    CONSTITUTIONAL: Well-groomed, in no apparent distress  EYES: No conjunctival or scleral injection, non-icteric; PERRLA and symmetric  ENMT: No external nasal lesions; no pharyngeal injection or exudates, oral mucosa with moist membranes  NECK: Trachea midline without palpable neck mass; thyroid not enlarged and non-tender  RESPIRATORY: Breathing comfortably; lungs CTA without wheeze/rhonchi/rales  CARDIOVASCULAR: +S1S2, RRR, no M/G/R; pedal pulses full and symmetric; no lower extremity edema  GASTROINTESTINAL: No palpable masses, +BS throughout, no rebound/guarding; no hernia palpated. +mild RUQ tenderness to palpation  LYMPHATIC: No cervical LAD or tenderness; no axillary LAD or tenderness  MUSCULOSKELETAL: no digital clubbing or cyanosis; no paraspinal tenderness; normal strength and tone of extremities  SKIN: No rashes or ulcers noted; no subcutaneous nodules or induration palpable  NEUROLOGIC: CN II-XII intact; sensation intact in LEs b/l to light touch  PSYCHIATRIC: A+O x 3; mood and affect appropriate; appropriate insight and judgment

## 2021-07-27 NOTE — H&P ADULT - NSICDXFAMILYHX_GEN_ALL_CORE_FT
FAMILY HISTORY:  Family history of hypertension in mother    Sibling  Still living? Unknown  FH: cirrhosis, Age at diagnosis: Age Unknown    Grandparent  Still living? No  Family history of malignant neoplasm, Age at diagnosis: Age Unknown

## 2021-07-27 NOTE — H&P ADULT - NSHPREVIEWOFSYSTEMS_GEN_ALL_CORE
REVIEW OF SYSTEMS:    CONSTITUTIONAL: No weakness, weight loss, fevers or chills  EYES/ENT: No visual changes;  No vertigo or throat pain   NECK: No pain or stiffness  RESPIRATORY: No cough, wheezing, hemoptysis; No shortness of breath  CARDIOVASCULAR: No palpitations, SANTAMARIA. + intermittent chest pain  GASTROINTESTINAL: No nausea, vomiting, or hematemesis; No diarrhea or constipation. No melena or hematochezia. +LUQ and RUQ pain.  GENITOURINARY: No dysuria, frequency or hematuria  NEUROLOGICAL: No numbness or weakness, AAOX3  SKIN: No itching, rashes  MUSCULOSKELETAL: no joint erythema, no joint swelling  PSYCHIATRIC: no depression, no anxiety

## 2021-07-27 NOTE — H&P ADULT - ASSESSMENT
62 yo female w/pmh ESRD on HD MWF (chronic GN), HTN, MAK 2 + polycythemia vera dx 2012, complicated by splenomegaly and history of splenic vein thrombosis 2015, now progressing to secondary myelofibrosis, presents to [facility] after being called by her hematologist for a recent hgb of 6.9.

## 2021-07-27 NOTE — H&P ADULT - HISTORY OF PRESENT ILLNESS
62 yo female w/pmh ESRD on HD MWF (chronic GN), HTN, MAK 2 + polycythemia vera dx 2012, complicated by splenomegaly and history of splenic vein thrombosis 2015, now progressing to secondary myelofibrosis, presents to [facility] after being called by her hematologist for a recent hgb of 6.9. She got 1u prbc in the ER. She endorsed dizziness, lightheadedness, but those symptoms are better after transfusion. Also has chronic left upper quadrant pain which she says is now progressing to right upper quadrant pain, and this is nwe. Not associated with food. Last went to dialysis yesterday. Denies dysuria. Denies any bleeding, blood in stools, or dark stools. CT A/P without contrast obtained in ER showed splenomegaly and splenic infarcts but no other new pathology.     Also mentions that for the past 2 months, on and off, she has been having left sided "pinching" chest pain that lasts for a few seconds, not related to activity, occurs in varying frequency during the day. No shortness of breath.

## 2021-07-28 DIAGNOSIS — N25.0 RENAL OSTEODYSTROPHY: ICD-10-CM

## 2021-07-28 DIAGNOSIS — I10 ESSENTIAL (PRIMARY) HYPERTENSION: ICD-10-CM

## 2021-07-28 LAB
ANION GAP SERPL CALC-SCNC: 13 MMOL/L — SIGNIFICANT CHANGE UP (ref 5–17)
ANION GAP SERPL CALC-SCNC: 16 MMOL/L — SIGNIFICANT CHANGE UP (ref 5–17)
BASOPHILS # BLD AUTO: 0.05 K/UL — SIGNIFICANT CHANGE UP (ref 0–0.2)
BASOPHILS NFR BLD AUTO: 0.6 % — SIGNIFICANT CHANGE UP (ref 0–2)
BUN SERPL-MCNC: 14 MG/DL — SIGNIFICANT CHANGE UP (ref 7–23)
BUN SERPL-MCNC: 43 MG/DL — HIGH (ref 7–23)
CALCIUM SERPL-MCNC: 8.7 MG/DL — SIGNIFICANT CHANGE UP (ref 8.4–10.5)
CALCIUM SERPL-MCNC: 9.8 MG/DL — SIGNIFICANT CHANGE UP (ref 8.4–10.5)
CHLORIDE SERPL-SCNC: 93 MMOL/L — LOW (ref 96–108)
CHLORIDE SERPL-SCNC: 97 MMOL/L — SIGNIFICANT CHANGE UP (ref 96–108)
CO2 SERPL-SCNC: 21 MMOL/L — LOW (ref 22–31)
CO2 SERPL-SCNC: 24 MMOL/L — SIGNIFICANT CHANGE UP (ref 22–31)
COVID-19 SPIKE DOMAIN AB INTERP: POSITIVE
COVID-19 SPIKE DOMAIN ANTIBODY RESULT: >250 U/ML — HIGH
CREAT SERPL-MCNC: 1.88 MG/DL — HIGH (ref 0.5–1.3)
CREAT SERPL-MCNC: 5.27 MG/DL — HIGH (ref 0.5–1.3)
EOSINOPHIL # BLD AUTO: 0.16 K/UL — SIGNIFICANT CHANGE UP (ref 0–0.5)
EOSINOPHIL NFR BLD AUTO: 2 % — SIGNIFICANT CHANGE UP (ref 0–6)
GLUCOSE SERPL-MCNC: 148 MG/DL — HIGH (ref 70–99)
GLUCOSE SERPL-MCNC: 86 MG/DL — SIGNIFICANT CHANGE UP (ref 70–99)
HCT VFR BLD CALC: 25.7 % — LOW (ref 34.5–45)
HGB BLD-MCNC: 8 G/DL — LOW (ref 11.5–15.5)
IMM GRANULOCYTES NFR BLD AUTO: 1.5 % — SIGNIFICANT CHANGE UP (ref 0–1.5)
INR BLD: 1.14 RATIO — SIGNIFICANT CHANGE UP (ref 0.88–1.16)
LYMPHOCYTES # BLD AUTO: 1.08 K/UL — SIGNIFICANT CHANGE UP (ref 1–3.3)
LYMPHOCYTES # BLD AUTO: 13.2 % — SIGNIFICANT CHANGE UP (ref 13–44)
MAGNESIUM SERPL-MCNC: 2.4 MG/DL — SIGNIFICANT CHANGE UP (ref 1.6–2.6)
MCHC RBC-ENTMCNC: 30.7 PG — SIGNIFICANT CHANGE UP (ref 27–34)
MCHC RBC-ENTMCNC: 31.1 GM/DL — LOW (ref 32–36)
MCV RBC AUTO: 98.5 FL — SIGNIFICANT CHANGE UP (ref 80–100)
MONOCYTES # BLD AUTO: 0.37 K/UL — SIGNIFICANT CHANGE UP (ref 0–0.9)
MONOCYTES NFR BLD AUTO: 4.5 % — SIGNIFICANT CHANGE UP (ref 2–14)
NEUTROPHILS # BLD AUTO: 6.4 K/UL — SIGNIFICANT CHANGE UP (ref 1.8–7.4)
NEUTROPHILS NFR BLD AUTO: 78.2 % — HIGH (ref 43–77)
NRBC # BLD: 0 /100 WBCS — SIGNIFICANT CHANGE UP (ref 0–0)
PHOSPHATE SERPL-MCNC: 4.8 MG/DL — HIGH (ref 2.5–4.5)
PLATELET # BLD AUTO: 208 K/UL — SIGNIFICANT CHANGE UP (ref 150–400)
POTASSIUM SERPL-MCNC: 3.3 MMOL/L — LOW (ref 3.5–5.3)
POTASSIUM SERPL-MCNC: 6.1 MMOL/L — HIGH (ref 3.5–5.3)
POTASSIUM SERPL-SCNC: 3.3 MMOL/L — LOW (ref 3.5–5.3)
POTASSIUM SERPL-SCNC: 6.1 MMOL/L — HIGH (ref 3.5–5.3)
PROTHROM AB SERPL-ACNC: 13.6 SEC — SIGNIFICANT CHANGE UP (ref 10.6–13.6)
RBC # BLD: 2.61 M/UL — LOW (ref 3.8–5.2)
RBC # FLD: 20.9 % — HIGH (ref 10.3–14.5)
SARS-COV-2 IGG+IGM SERPL QL IA: >250 U/ML — HIGH
SARS-COV-2 IGG+IGM SERPL QL IA: POSITIVE
SODIUM SERPL-SCNC: 131 MMOL/L — LOW (ref 135–145)
SODIUM SERPL-SCNC: 133 MMOL/L — LOW (ref 135–145)
WBC # BLD: 8.18 K/UL — SIGNIFICANT CHANGE UP (ref 3.8–10.5)
WBC # FLD AUTO: 8.18 K/UL — SIGNIFICANT CHANGE UP (ref 3.8–10.5)

## 2021-07-28 PROCEDURE — 99223 1ST HOSP IP/OBS HIGH 75: CPT | Mod: GC

## 2021-07-28 PROCEDURE — 99222 1ST HOSP IP/OBS MODERATE 55: CPT | Mod: GC

## 2021-07-28 PROCEDURE — 93010 ELECTROCARDIOGRAM REPORT: CPT

## 2021-07-28 RX ORDER — SUCRALFATE 1 G
1 TABLET ORAL
Refills: 0 | Status: DISCONTINUED | OUTPATIENT
Start: 2021-07-28 | End: 2021-07-31

## 2021-07-28 RX ORDER — PANTOPRAZOLE SODIUM 20 MG/1
40 TABLET, DELAYED RELEASE ORAL DAILY
Refills: 0 | Status: DISCONTINUED | OUTPATIENT
Start: 2021-07-28 | End: 2021-07-31

## 2021-07-28 RX ORDER — ERYTHROPOIETIN 10000 [IU]/ML
15000 INJECTION, SOLUTION INTRAVENOUS; SUBCUTANEOUS
Refills: 0 | Status: DISCONTINUED | OUTPATIENT
Start: 2021-07-28 | End: 2021-07-31

## 2021-07-28 RX ADMIN — Medication 1 TABLET(S): at 12:05

## 2021-07-28 RX ADMIN — Medication 1334 MILLIGRAM(S): at 17:10

## 2021-07-28 RX ADMIN — Medication 1 GRAM(S): at 22:43

## 2021-07-28 RX ADMIN — ERYTHROPOIETIN 15000 UNIT(S): 10000 INJECTION, SOLUTION INTRAVENOUS; SUBCUTANEOUS at 17:52

## 2021-07-28 RX ADMIN — Medication 1334 MILLIGRAM(S): at 12:07

## 2021-07-28 RX ADMIN — Medication 1334 MILLIGRAM(S): at 08:56

## 2021-07-28 NOTE — CONSULT NOTE ADULT - PROBLEM SELECTOR RECOMMENDATION 9
Pt. with ESRD on HD three times a week (MWF). She follows with Dr De León at [facility] dialysis. Last outpatient HD was on 7/26 via left AVF. Pt. clinically stable. Labs reviewed, noted hyperkalemic. Will arrange for maintenance HD today. Monitor labs. Adjust meds to HD

## 2021-07-28 NOTE — PROGRESS NOTE ADULT - SUBJECTIVE AND OBJECTIVE BOX
MEDICINE, PROGRESS NOTE [phone]    HU, MIHYEON 61y [id number]    Patient seen and examined.  Pt states she vomited at night, pepcid iv made her feel nauseous, feels dizzy with sudden movements.  Patient is a 61y old  Female who presents with a chief complaint of anemia (28 Jul 2021 14:02)      PAST MEDICAL & SURGICAL HISTORY:  Polycythemia vera  and secondary myelofibrosis    Peptic ulcer disease    Hypertension    Splenomegaly  2015    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    Cirrhosis of liver without ascites, unspecified hepatic cirrhosis type    Pancreatic cyst    Peritoneal dialysis catheter in place  Placed 8/9/2017    Hemoperitoneum as complication of peritoneal dialysis  s/p removal 9/18    AV fistula  Placed 9/18      MEDICATIONS  (STANDING):  calcium acetate 1334 milliGRAM(s) Oral three times a day with meals  chlorhexidine 2% Cloths 1 Application(s) Topical daily  epoetin filiberto-epbx (RETACRIT) Injectable 30349 Unit(s) IV Push <User Schedule>  Nephro-deonna 1 Tablet(s) Oral daily  propranolol 10 milliGRAM(s) Oral daily    MEDICATIONS  (PRN):  acetaminophen   Tablet .. 650 milliGRAM(s) Oral every 6 hours PRN Temp greater or equal to 38.5C (101.3F), Mild Pain (1 - 3)  aluminum hydroxide/magnesium hydroxide/simethicone Suspension 30 milliLiter(s) Oral every 4 hours PRN Dyspepsia  melatonin 3 milliGRAM(s) Oral at bedtime PRN Insomnia  ondansetron Injectable 4 milliGRAM(s) IV Push every 8 hours PRN Nausea and/or Vomiting    Allergies    No Known Allergies    Intolerances        PHYSICAL EXAM:  Constitutional: NAD  HEENT: Normocephalic, EOMI  Neck:  No JVD  Respiratory: CTA B/L, No wheezes  Cardiovascular: S1, S2, RRR,  Gastrointestinal: BS+, soft, NT/ND  Extremities: No peripheral edema le b/l, pp+  Neurological: AAOX3, no focal deficits  Psychiatric: Normal mood, normal affect  : No Stoddard    Vital Signs Last 24 Hrs  T(C): 36.7 (28 Jul 2021 15:18), Max: 36.8 (28 Jul 2021 05:26)  T(F): 98 (28 Jul 2021 15:18), Max: 98.3 (28 Jul 2021 05:26)  HR: 78 (28 Jul 2021 15:18) (73 - 78)  BP: 135/82 (28 Jul 2021 15:18) (109/71 - 146/77)  BP(mean): --  RR: 18 (28 Jul 2021 15:18) (18 - 18)  SpO2: 98% (28 Jul 2021 15:18) (95% - 98%)  I&O's Summary    27 Jul 2021 07:01  -  28 Jul 2021 07:00  --------------------------------------------------------  IN: 0 mL / OUT: 0 mL / NET: 0 mL        LABS:                        8.0    8.18  )-----------( 208      ( 28 Jul 2021 07:09 )             25.7     07-28    131<L>  |  97  |  43<H>  ----------------------------<  86  6.1<H>   |  21<L>  |  5.27<H>    Ca    8.7      28 Jul 2021 07:08  Phos  4.8     07-28  Mg     2.4     07-28    TPro  8.7<H>  /  Alb  4.8  /  TBili  2.2<H>  /  DBili  x   /  AST  25  /  ALT  9<L>  /  AlkPhos  175<H>  07-26        Magnesium, Serum: 2.4 mg/dL (07-28 @ 07:08)        REVIEW OF SYSTEMS:  no ha, + dizziness  no cp or palpitations  no sob or cough  no dysuria or hematuria  no v/d today, + nausea  no rash or itchiness  all other reviewed systems were negative.      ASSESSMENT/PLAN:  62 yo female w/pmh ESRD on HD MWF (chronic GN), HTN, MAK 2 + polycythemia vera dx 2012, complicated by splenomegaly and history of splenic vein thrombosis 2015, now progressing to secondary myelofibrosis, presents to [facility] after being called by her hematologist for a recent hgb of 6.9.     Problem/Plan - 1:  ·  Problem: Polycythemia vera.  Plan: with secondary myelofibrosis  - cont jakafi - patient needs to bring her own from home (non-formulary).      Problem/Plan - 2:  ·  Problem: Anemia.  Plan: due to myelofibrosis  monitor hgb daily  cont jakafi - patient has her own supply of 4 pills with her, informed her to give to the nurse so that we can administer the med to her  emailed Dr. Jacky Guo about admission  monitor for bleeding  B12/folate/ferritin were last checked 4/2021, all were wnl.     Problem/Plan - 3:  ·  Problem: ESRD (end stage renal disease).  Plan: cont MWF schedule  consult renal  cont nephro-deonna  cont phoslo.      Problem/Plan - 4:  ·  Problem: Splenic infarct.  Plan: and splenomegaly  since 2015, has been managed conservatively.      Problem/Plan - 5:  ·  Problem: Right upper quadrant abdominal pain.  Plan: CT A/P no clear pathology to explain this pain, will monitor.      Problem/Plan - 6:  Problem: Essential hypertension. Plan: cont propranolol with hold parameters.    appreciate renal input  await heme/onc input  start carafate and protonix  d/c planning

## 2021-07-28 NOTE — CONSULT NOTE ADULT - ASSESSMENT
Mihyeon Hu is a 62 yo female w/ PMHx of ESRD on HD MWF (chronic GN), HTN, MAK 2 + polycythemia vera dx 2012, complicated by splenomegaly and history of splenic vein thrombosis 2015, now progressing to secondary myelofibrosis (last BM biopsy 6/15/21), presents to [facility] after being called by her hematologist for a recent hgb of 6.9. Hematology consulted for anemia.     # Polycythemia Vera   - recent admission in May for anemia, s/p 1u pRBCs  - last BM biopsy 6/15 showing myelofibrosis  - anemia likely secondary to progression to myelofibrosis since last admission  - s/p 1u pRBCs this admission  - leukocytes and platelets at baseline  - Follows with Dr. Jacky Gou  - Ok to continue with Jakafi   - transfuse for Hgb goal>7    # Splenomegaly w/ h/o of splenic venous thrombosis (2015)  - likely contributing to worsening abdominal pain  - CT a/p w/o showed stable splenomegaly with stable small infarcts  - last doppler 1/2021 showed splenomegaly about 20cm  - repeat doppler this admission

## 2021-07-28 NOTE — CONSULT NOTE ADULT - SUBJECTIVE AND OBJECTIVE BOX
[facility] DIVISION OF KIDNEY DISEASES AND HYPERTENSION -- [phone]  -- INITIAL CONSULT NOTE  --------------------------------------------------------------------------------  If any questions, please feel free to contact me  NS pager: [phone], [facility]: [phone]  Lincoln Altman M.D.  Nephrology Fellow    (After 5 pm or on weekends please page the on-call fellow)  --------------------------------------------------------------------------------    HPI:  61 y.o. Female with PMhx ESKD on HD MWF (at [facility] dialysis unit Follows with Dr. De León), HTN, MAK 2 + polycythemia vera dx 2012, complicated by splenomegaly and history of splenic vein thrombosis 2015, now progressing to secondary myelofibrosis, presents to [facility] after being called for a recent hgb of 6.9. At ED she got 1 unit of PRBC transfusion. She reports previous transfusion was feeling fatigue and lightheaded but now resolved. Had left sided abd pain yesterday, mildly improved today. She reports last HD was on Monday 7/26/21.    Nephrology consulted for ESKD management.         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    Cirrhosis of liver without ascites, unspecified hepatic cirrhosis type    Pancreatic cyst    Peritoneal dialysis catheter in place  Placed 8/9/2017    Hemoperitoneum as complication of peritoneal dialysis  s/p removal 9/18    AV fistula  Placed 9/18      FAMILY HISTORY:  Family history of hypertension in mother    Family history of malignant neoplasm (Grandparent)  head and neck in father    FH: cirrhosis (Sibling)      PAST SOCIAL HISTORY:  no smoking, no drugs, no alcohol.     ALLERGIES & MEDICATIONS  --------------------------------------------------------------------------------  Allergies    No Known Allergies    Intolerances      Standing Inpatient Medications  calcium acetate 1334 milliGRAM(s) Oral three times a day with meals  chlorhexidine 2% Cloths 1 Application(s) Topical daily  Nephro-deonna 1 Tablet(s) Oral daily  propranolol 10 milliGRAM(s) Oral daily    PRN Inpatient Medications  acetaminophen   Tablet .. 650 milliGRAM(s) Oral every 6 hours PRN  aluminum hydroxide/magnesium hydroxide/simethicone Suspension 30 milliLiter(s) Oral every 4 hours PRN  melatonin 3 milliGRAM(s) Oral at bedtime PRN  ondansetron Injectable 4 milliGRAM(s) IV Push every 8 hours PRN      REVIEW OF SYSTEMS  --------------------------------------------------------------------------------  Gen: No fevers/chills  Skin: No rashes  Head/Eyes/Ears: Normal hearing,   Respiratory: No dyspnea, cough  CV: No chest pain  GI: No abdominal pain, diarrhea  : No dysuria, hematuria  MSK: No  edema  All other systems were reviewed and are negative, except as noted.    VITALS/PHYSICAL EXAM  --------------------------------------------------------------------------------  T(C): 36.8 (07-28-21 @ 07:45), Max: 36.8 (07-28-21 @ 05:26)  HR: 73 (07-28-21 @ 07:45) (73 - 75)  BP: 146/77 (07-28-21 @ 07:45) (109/71 - 146/77)  RR: 18 (07-28-21 @ 07:45) (18 - 18)  SpO2: 95% (07-28-21 @ 07:45) (95% - 98%)  Wt(kg): --  Height (cm): 157.5 (07-26-21 @ 20:58)  Weight (kg): 52 (07-26-21 @ 20:58)  BMI (kg/m2): 21 (07-26-21 @ 20:58)  BSA (m2): 1.51 (07-26-21 @ 20:58)      07-27-21 @ 07:01  -  07-28-21 @ 07:00  --------------------------------------------------------  IN: 0 mL / OUT: 0 mL / NET: 0 mL      Physical Exam:  	Gen: NAD  	HEENT: MMM  	Pulm: CTA B/L  	CV: S1S2  	Abd: Soft, +BS   	Ext: No LE edema B/L  	Neuro: Awake  	Skin: Warm and dry             : no suprapubic tenderness    	Vascular access: left AVF +thrill and +bruit.     LABS/STUDIES  --------------------------------------------------------------------------------              8.0    8.18  >-----------<  208      [07-28-21 @ 07:09]              25.7     131  |  97  |  43  ----------------------------<  86      [07-28-21 @ 07:08]  6.1   |  21  |  5.27        Ca     8.7     [07-28-21 @ 07:08]      Mg     2.4     [07-28-21 @ 07:08]      Phos  4.8     [07-28-21 @ 07:08]    TPro  8.7  /  Alb  4.8  /  TBili  2.2  /  DBili  x   /  AST  25  /  ALT  9   /  AlkPhos  175  [07-26-21 @ 21:37]    PT/INR: PT 13.6 , INR 1.14       [07-28-21 @ 07:10]      Creatinine Trend:  SCr 5.27 [07-28 @ 07:08]  SCr 2.68 [07-26 @ 21:37]  SCr 4.78 [07-18 @ 01:40]    Urinalysis - [01-09-21 @ 12:32]      Color Yellow / Appearance Slightly Turbid / SG 1.021 / pH 8.0      Gluc Trace / Ketone Trace  / Bili Negative / Urobili Negative       Blood Trace / Protein 300 mg/dL / Leuk Est Large / Nitrite Negative      RBC 1 /  / Hyaline 8 / Gran  / Sq Epi  / Non Sq Epi 18 / Bacteria Many      Iron 42, TIBC 109, %sat 38      [04-11-21 @ 11:26]  Ferritin 1198      [04-13-21 @ 10:06]  HbA1c 4.9      [06-01-19 @ 00:46]    HBsAb 9.5      [04-10-21 @ 23:35]  HBsAb Reactive      [01-25-20 @ 16:12]  HBsAg Nonreact      [04-10-21 @ 23:35]  HBcAb Nonreact      [04-10-21 @ 23:35]  HCV 0.21, Nonreact      [04-10-21 @ 23:35]  HIV Nonreact      [04-11-21 @ 08:31]    RAHUL: titer 1:80, pattern Homogeneous      [05-16-18 @ 22:43]  C3 Complement 100      [01-19-17 @ 20:01]  C4 Complement 32      [01-19-17 @ 20:01]  Rheumatoid Factor <7.0      [01-19-17 @ 20:01]  ANCA: cANCA Negative, pANCA Negative, atypical ANCA Negative      [01-19-17 @ 20:01]  anti-GBM <0.2      [01-20-17 @ 01:52]  ASLO 59      [01-19-17 @ 20:01]  Syphilis Screen (Treponema Pallidum Ab) Negative      [01-19-17 @ 20:01]  Free Light Chains: kappa 12.00, lambda 14.10, ratio = 0.85      [01-23 @ 14:39]  Immunofixation Serum:   No Monoclonal Band Identified      [01-23-17 @ 14:39]  SPEP Interpretation: Polyclonal Gammopathy      [01-23-17 @ 14:39]

## 2021-07-28 NOTE — CONSULT NOTE ADULT - PROBLEM SELECTOR RECOMMENDATION 2
sent for low hgb of 6.9 - At ED she got 1 unit of PRBC transfusion.   Currently asymptomatic -monitor h/h, transfuse for hgb<7  will continue retacrit 15K TIW with HD

## 2021-07-28 NOTE — CONSULT NOTE ADULT - ASSESSMENT
61F PMhx ESKD on HD MWF (at [facility] dialysis unit Follows with Dr. 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     Nephrology consulted for ESKD management.

## 2021-07-28 NOTE — CONSULT NOTE ADULT - PROBLEM SELECTOR RECOMMENDATION 4
Patient with hyperphosphatemia - Please start on phosphate binders with meals. Low phosphorus diet.  Monitor serum phosphorus. Patient with hyperphosphatemia - On phoslo with meals. Low phosphorus diet.  Monitor serum phosphorus.

## 2021-07-28 NOTE — CONSULT NOTE ADULT - SUBJECTIVE AND OBJECTIVE BOX
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 She got 1u prbc in the ER. She endorsed dizziness, lightheadedness, but those symptoms are better after transfusion. Also has chronic left upper quadrant pain which she says is now progressing to right upper quadrant pain, and this is nwe. Not associated with food. Last went to dialysis yesterday. Denies dysuria. Denies any bleeding, blood in stools, or dark stools. CT A/P without contrast obtained in ER showed splenomegaly and splenic infarcts but no other new pathology.     Also mentions that for the past 2 months, on and off, she has been having left sided "pinching" chest pain that lasts for a few seconds, not related to activity, occurs in varying frequency during the day. No shortness of breath.        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    Cirrhosis of liver without ascites, unspecified hepatic cirrhosis type    Pancreatic cyst    Peritoneal dialysis catheter in place  Placed 8/9/2017    Hemoperitoneum as complication of peritoneal dialysis  s/p removal 9/18    AV fistula  Placed 9/18        SOCIAL HISTORY:    FAMILY HISTORY:  Family history of hypertension in mother    Family history of malignant neoplasm (Grandparent)  head and neck in father    FH: cirrhosis (Sibling)        MEDICATIONS  (STANDING):  calcium acetate 1334 milliGRAM(s) Oral three times a day with meals  chlorhexidine 2% Cloths 1 Application(s) Topical daily  Nephro-deonna 1 Tablet(s) Oral daily  propranolol 10 milliGRAM(s) Oral daily    MEDICATIONS  (PRN):  acetaminophen   Tablet .. 650 milliGRAM(s) Oral every 6 hours PRN Temp greater or equal to 38.5C (101.3F), Mild Pain (1 - 3)  aluminum hydroxide/magnesium hydroxide/simethicone Suspension 30 milliLiter(s) Oral every 4 hours PRN Dyspepsia  melatonin 3 milliGRAM(s) Oral at bedtime PRN Insomnia  ondansetron Injectable 4 milliGRAM(s) IV Push every 8 hours PRN Nausea and/or Vomiting      Allergies    No Known Allergies    Intolerances        Vital Signs Last 24 Hrs  T(C): 36.8 (28 Jul 2021 07:45), Max: 36.8 (28 Jul 2021 05:26)  T(F): 98.2 (28 Jul 2021 07:45), Max: 98.3 (28 Jul 2021 05:26)  HR: 73 (28 Jul 2021 07:45) (73 - 75)  BP: 146/77 (28 Jul 2021 07:45) (109/71 - 146/77)  BP(mean): --  RR: 18 (28 Jul 2021 07:45) (18 - 18)  SpO2: 95% (28 Jul 2021 07:45) (95% - 98%)    PHYSICAL EXAM  General: adult in NAD  HEENT: clear oropharynx, anicteric sclera, pink conjunctiva  Neck: supple  CV: normal S1/S2 with no murmur rubs or gallops  Lungs: positive air movement b/l ant lungs,clear to auscultation, no wheezes, no rales  Abdomen: soft non-tender non-distended, no hepatosplenomegaly  Ext: no clubbing cyanosis or edema  Skin: no rashes and no petechiae  Neuro: alert and oriented X 4, no focal deficits      07-27-21 @ 07:01  -  07-28-21 @ 07:00  --------------------------------------------------------  IN: 0 mL / OUT: 0 mL / NET: 0 mL      LABS:                          8.0    8.18  )-----------( 208      ( 28 Jul 2021 07:09 )             25.7         Mean Cell Volume : 98.5 fl  Mean Cell Hemoglobin : 30.7 pg  Mean Cell Hemoglobin Concentration : 31.1 gm/dL  Auto Neutrophil # : 6.40 K/uL  Auto Lymphocyte # : 1.08 K/uL  Auto Monocyte # : 0.37 K/uL  Auto Eosinophil # : 0.16 K/uL  Auto Basophil # : 0.05 K/uL  Auto Neutrophil % : 78.2 %  Auto Lymphocyte % : 13.2 %  Auto Monocyte % : 4.5 %  Auto Eosinophil % : 2.0 %  Auto Basophil % : 0.6 %      07-28    131<L>  |  97  |  43<H>  ----------------------------<  86  6.1<H>   |  21<L>  |  5.27<H>    Ca    8.7      28 Jul 2021 07:08  Phos  4.8     07-28  Mg     2.4     07-28    TPro  8.7<H>  /  Alb  4.8  /  TBili  2.2<H>  /  DBili  x   /  AST  25  /  ALT  9<L>  /  AlkPhos  175<H>  07-26      PT/INR - ( 28 Jul 2021 07:10 )   PT: 13.6 sec;   INR: 1.14 ratio                         BLOOD SMEAR INTERPRETATION:       RADIOLOGY & ADDITIONAL STUDIES:       HEMATOLOGY ONCOLOGY CONSULT     Patient is a 61y old  Female who presents with a chief complaint of anemia (28 Jul 2021 13:22)      HPI:  Mihyeon Hu is a 60 yo female w/ PMHx of ESRD on HD MWF (chronic GN), HTN, MAK 2 + polycythemia vera dx 2012, complicated by splenomegaly and history of splenic vein thrombosis 2015, 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 She got 1u pRBCs in the ER. She endorsed dizziness, lightheadedness, but those symptoms are better after transfusion. Also has chronic, intermittent left upper quadrant pain which she says is now progressing to right upper quadrant pain. LUQ pain ongoing for past 2 months. Not associated with food. Last went to dialysis Monday. Denies dysuria. Denies any bleeding, blood in stools, or dark stools. CT A/P without contrast obtained in ER showed stanle splenomegaly and splenic infarcts but no other new pathology. Also mentions that for the past 2 months, on and off, she has been having left sided "pinching" chest pain that lasts for a few seconds, not related to activity, occurs in varying frequency during the day.     Hematology consulted for anemia. Patient had similar admission in May, where she received 1u pRBCs. At that time, there was high suspicion for polycythemia vera progressing to myelofibrosis. She receives procrit with dialysis MWF. Last BM biopsy 6/15 showed progression to myelofibrosis. Since last visit she has been transitioned from Hydrea to Jakafi. Per patient, started Jakafi three weeks ago. Per outpatient note with hematologist Dr. Jacky Guo, on Jakafi for abdominal distention/thrombosis from splenomegaly. Patient seen at bedside. She reports worsening LUQ abdominal pain for past 2 months. Pain is intermittent but has been occurring more frequently. She reports stable dyspnea on exertion. Denies chest pain, hematemesis, hematochezia, melena, hematuria.      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     Quit smoking 2015. 1 ppd per 3 days for 15 years.  Denies alcoholor other drug use    FAMILY HISTORY:  Family history of hypertension in mother    Family history of malignant neoplasm (Grandparent)  head and neck in father    FH: cirrhosis (Sibling)      MEDICATIONS  (STANDING):  calcium acetate 1334 milliGRAM(s) Oral three times a day with meals  chlorhexidine 2% Cloths 1 Application(s) Topical daily  Nephro-deonna 1 Tablet(s) Oral daily  propranolol 10 milliGRAM(s) Oral daily    MEDICATIONS  (PRN):  acetaminophen   Tablet .. 650 milliGRAM(s) Oral every 6 hours PRN Temp greater or equal to 38.5C (101.3F), Mild Pain (1 - 3)  aluminum hydroxide/magnesium hydroxide/simethicone Suspension 30 milliLiter(s) Oral every 4 hours PRN Dyspepsia  melatonin 3 milliGRAM(s) Oral at bedtime PRN Insomnia  ondansetron Injectable 4 milliGRAM(s) IV Push every 8 hours PRN Nausea and/or Vomiting      Allergies    No Known Allergies    Intolerances        Vital Signs Last 24 Hrs  T(C): 36.8 (28 Jul 2021 07:45), Max: 36.8 (28 Jul 2021 05:26)  T(F): 98.2 (28 Jul 2021 07:45), Max: 98.3 (28 Jul 2021 05:26)  HR: 73 (28 Jul 2021 07:45) (73 - 75)  BP: 146/77 (28 Jul 2021 07:45) (109/71 - 146/77)  BP(mean): --  RR: 18 (28 Jul 2021 07:45) (18 - 18)  SpO2: 95% (28 Jul 2021 07:45) (95% - 98%)    PHYSICAL EXAM  General: adult in NAD, lying in bed  HEENT: EOMI, anicteric sclera  Neck: no lymphadenopathy  CV: normal S1/S2 with no murmur rubs or gallops  Lungs: CTAB, no increased respiratory effort  Abdomen: tender to palpation in LUQ, +splenomegaly, soft, nondistended  Ext: no bruising or petechiae, no peripheral edema  Skin: no rashes, bruising, or petechiae  Neuro: alert and oriented X 4, no focal deficits      07-27-21 @ 07:01  -  07-28-21 @ 07:00  --------------------------------------------------------  IN: 0 mL / OUT: 0 mL / NET: 0 mL      LABS:                          8.0    8.18  )-----------( 208      ( 28 Jul 2021 07:09 )             25.7         Mean Cell Volume : 98.5 fl  Mean Cell Hemoglobin : 30.7 pg  Mean Cell Hemoglobin Concentration : 31.1 gm/dL  Auto Neutrophil # : 6.40 K/uL  Auto Lymphocyte # : 1.08 K/uL  Auto Monocyte # : 0.37 K/uL  Auto Eosinophil # : 0.16 K/uL  Auto Basophil # : 0.05 K/uL  Auto Neutrophil % : 78.2 %  Auto Lymphocyte % : 13.2 %  Auto Monocyte % : 4.5 %  Auto Eosinophil % : 2.0 %  Auto Basophil % : 0.6 %      07-28    131<L>  |  97  |  43<H>  ----------------------------<  86  6.1<H>   |  21<L>  |  5.27<H>    Ca    8.7      28 Jul 2021 07:08  Phos  4.8     07-28  Mg     2.4     07-28    TPro  8.7<H>  /  Alb  4.8  /  TBili  2.2<H>  /  DBili  x   /  AST  25  /  ALT  9<L>  /  AlkPhos  175<H>  07-26      PT/INR - ( 28 Jul 2021 07:10 )   PT: 13.6 sec;   INR: 1.14 ratio                         BLOOD SMEAR INTERPRETATION:       RADIOLOGY & ADDITIONAL STUDIES:

## 2021-07-29 LAB
ANION GAP SERPL CALC-SCNC: 14 MMOL/L — SIGNIFICANT CHANGE UP (ref 5–17)
BILIRUB SERPL-MCNC: 1.8 MG/DL — HIGH (ref 0.2–1.2)
BUN SERPL-MCNC: 25 MG/DL — HIGH (ref 7–23)
CALCIUM SERPL-MCNC: 9.7 MG/DL — SIGNIFICANT CHANGE UP (ref 8.4–10.5)
CHLORIDE SERPL-SCNC: 93 MMOL/L — LOW (ref 96–108)
CO2 SERPL-SCNC: 25 MMOL/L — SIGNIFICANT CHANGE UP (ref 22–31)
CREAT SERPL-MCNC: 3.9 MG/DL — HIGH (ref 0.5–1.3)
GLUCOSE SERPL-MCNC: 97 MG/DL — SIGNIFICANT CHANGE UP (ref 70–99)
HBV CORE AB SER-ACNC: SIGNIFICANT CHANGE UP
HBV SURFACE AB SER-ACNC: REACTIVE
HBV SURFACE AG SER-ACNC: SIGNIFICANT CHANGE UP
HCT VFR BLD CALC: 27.2 % — LOW (ref 34.5–45)
HCV AB S/CO SERPL IA: 0.19 S/CO — SIGNIFICANT CHANGE UP (ref 0–0.99)
HCV AB SERPL-IMP: SIGNIFICANT CHANGE UP
HGB BLD-MCNC: 8.5 G/DL — LOW (ref 11.5–15.5)
INR BLD: 1.14 RATIO — SIGNIFICANT CHANGE UP (ref 0.88–1.16)
MCHC RBC-ENTMCNC: 30.2 PG — SIGNIFICANT CHANGE UP (ref 27–34)
MCHC RBC-ENTMCNC: 31.3 GM/DL — LOW (ref 32–36)
MCV RBC AUTO: 96.8 FL — SIGNIFICANT CHANGE UP (ref 80–100)
MELD SCORE WITH DIALYSIS: 26 POINTS — SIGNIFICANT CHANGE UP
MELD SCORE WITHOUT DIALYSIS: 26 POINTS — SIGNIFICANT CHANGE UP
NRBC # BLD: 1 /100 WBCS — HIGH (ref 0–0)
PLATELET # BLD AUTO: 196 K/UL — SIGNIFICANT CHANGE UP (ref 150–400)
POTASSIUM SERPL-MCNC: 4.8 MMOL/L — SIGNIFICANT CHANGE UP (ref 3.5–5.3)
POTASSIUM SERPL-SCNC: 4.8 MMOL/L — SIGNIFICANT CHANGE UP (ref 3.5–5.3)
PROTHROM AB SERPL-ACNC: 13.6 SEC — SIGNIFICANT CHANGE UP (ref 10.6–13.6)
RBC # BLD: 2.81 M/UL — LOW (ref 3.8–5.2)
RBC # FLD: 20.1 % — HIGH (ref 10.3–14.5)
SODIUM SERPL-SCNC: 132 MMOL/L — LOW (ref 135–145)
WBC # BLD: 7.95 K/UL — SIGNIFICANT CHANGE UP (ref 3.8–10.5)
WBC # FLD AUTO: 7.95 K/UL — SIGNIFICANT CHANGE UP (ref 3.8–10.5)

## 2021-07-29 PROCEDURE — 93975 VASCULAR STUDY: CPT | Mod: 26

## 2021-07-29 RX ORDER — SENNA PLUS 8.6 MG/1
2 TABLET ORAL AT BEDTIME
Refills: 0 | Status: DISCONTINUED | OUTPATIENT
Start: 2021-07-29 | End: 2021-07-31

## 2021-07-29 RX ADMIN — PANTOPRAZOLE SODIUM 40 MILLIGRAM(S): 20 TABLET, DELAYED RELEASE ORAL at 12:42

## 2021-07-29 RX ADMIN — Medication 1334 MILLIGRAM(S): at 12:42

## 2021-07-29 RX ADMIN — Medication 1 GRAM(S): at 17:30

## 2021-07-29 RX ADMIN — Medication 650 MILLIGRAM(S): at 04:00

## 2021-07-29 RX ADMIN — Medication 1334 MILLIGRAM(S): at 17:29

## 2021-07-29 RX ADMIN — Medication 1334 MILLIGRAM(S): at 09:13

## 2021-07-29 RX ADMIN — Medication 650 MILLIGRAM(S): at 03:15

## 2021-07-29 RX ADMIN — Medication 30 MILLILITER(S): at 03:15

## 2021-07-29 RX ADMIN — SENNA PLUS 2 TABLET(S): 8.6 TABLET ORAL at 21:59

## 2021-07-29 RX ADMIN — Medication 1 TABLET(S): at 17:29

## 2021-07-29 RX ADMIN — Medication 1 GRAM(S): at 06:36

## 2021-07-29 NOTE — PROGRESS NOTE ADULT - SUBJECTIVE AND OBJECTIVE BOX
MEDICINE, PROGRESS NOTE [phone]    HU, MIHYEON 61y [id number]    Patient seen and examined.  Pt with no new complaints.  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    Cirrhosis of liver without ascites, unspecified hepatic cirrhosis type    Pancreatic cyst    Peritoneal dialysis catheter in place  Placed 8/9/2017    Hemoperitoneum as complication of peritoneal dialysis  s/p removal 9/18    AV fistula  Placed 9/18      MEDICATIONS  (STANDING):  calcium acetate 1334 milliGRAM(s) Oral three times a day with meals  chlorhexidine 2% Cloths 1 Application(s) Topical daily  epoetin filiberto-epbx (RETACRIT) Injectable 21160 Unit(s) IV Push <User Schedule>  Nephro-deonna 1 Tablet(s) Oral daily  pantoprazole   Suspension 40 milliGRAM(s) Oral daily  propranolol 10 milliGRAM(s) Oral daily  sucralfate suspension 1 Gram(s) Oral two times a day    MEDICATIONS  (PRN):  acetaminophen   Tablet .. 650 milliGRAM(s) Oral every 6 hours PRN Temp greater or equal to 38.5C (101.3F), Mild Pain (1 - 3)  aluminum hydroxide/magnesium hydroxide/simethicone Suspension 30 milliLiter(s) Oral every 4 hours PRN Dyspepsia  melatonin 3 milliGRAM(s) Oral at bedtime PRN Insomnia  ondansetron Injectable 4 milliGRAM(s) IV Push every 8 hours PRN Nausea and/or Vomiting    Allergies    No Known Allergies    Intolerances        PHYSICAL EXAM:  Constitutional: NAD  HEENT: Normocephalic, EOMI  Neck:  No JVD  Respiratory: CTA B/L, No wheezes  Cardiovascular: S1, S2, RRR,  Gastrointestinal: BS+, soft, NT/ND  Extremities: No peripheral edema le b/l, pp+  Neurological: AAOX3, no focal deficits  Psychiatric: Normal mood, normal affect  : No Stoddard    Vital Signs Last 24 Hrs  T(C): 37 (29 Jul 2021 15:52), Max: 37.1 (29 Jul 2021 00:04)  T(F): 98.6 (29 Jul 2021 15:52), Max: 98.8 (29 Jul 2021 00:04)  HR: 81 (29 Jul 2021 15:52) (71 - 97)  BP: 136/84 (29 Jul 2021 15:52) (114/73 - 145/75)  BP(mean): --  RR: 18 (29 Jul 2021 15:52) (18 - 18)  SpO2: 98% (29 Jul 2021 15:52) (98% - 99%)  I&O's Summary    28 Jul 2021 07:01  -  29 Jul 2021 07:00  --------------------------------------------------------  IN: 800 mL / OUT: 2800 mL / NET: -2000 mL        LABS:                        8.5    7.95  )-----------( 196      ( 29 Jul 2021 07:05 )             27.2     07-29    132<L>  |  93<L>  |  25<H>  ----------------------------<  97  4.8   |  25  |  3.90<H>    Ca    9.7      29 Jul 2021 07:05  Phos  4.8     07-28  Mg     2.4     07-28    TPro  x   /  Alb  x   /  TBili  1.8<H>  /  DBili  x   /  AST  x   /  ALT  x   /  AlkPhos  x   07-29              REVIEW OF SYSTEMS:    no ha, + dizziness  no cp or palpitations  no sob or cough  no dysuria or hematuria  no v/d today, + nausea  no rash or itchiness  all other reviewed systems were negative.      RADIOLOGY & ADDITIONAL STUDIES:  < from: US Abdomen Doppler (07.29.21 @ 08:48) >    EXAM:  US DPLX ABDOMEN                            PROCEDURE DATE:  07/29/2021            INTERPRETATION:  CLINICAL INFORMATION: Splenomegaly with history of splenic vein thrombosis and polycythemia vera. Evaluate for recurrent thrombosis.    TECHNIQUE: Sonographic examination of the splenic, portal, and hepatic vasculature with Spectral and color Doppler .    COMPARISON: CT abdomen and pelvis 7/27/2021 and vascular ultrasound 1/6/2021      FINDINGS:    Splenic Vein: Patent with normal directionof flow.  Main Portal Vein: Hepatopetal flow. 1.1 cm maximum diameter. Velocity is 38 cm/s, within normal limits.  Left Portal Vein: Patent with hepatopetal flow.  Anterior Right Portal Vein: Patent with hepatopetal flow.  Posterior Right Portal Vein: Patent with hepatopetal flow.    Hepatic Artery: Normal direction of flow, peak systolic velocity 88 cm/s.    Hepatic Veins and Inferior Vena Cava: Patent with normal direction of flow.    Spleen: Splenomegaly, measuring 18 cm.    IMPRESSION:    No evidence of vascular thrombosis.    Splenomegaly.    --- End of Report ---              PIPER JAIME MD; Resident Radiology  This document has been electronically signed.  SHARITA FLORES MD; Attending Radiologist  This document has been electronically signed. 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     Problem/Plan - 1:  ·  Problem: Polycythemia vera.  Plan: with secondary myelofibrosis  - cont jakafi - patient needs to bring her own from home (non-formulary).      Problem/Plan - 2:  ·  Problem: Anemia.  Plan: due to myelofibrosis  monitor hgb daily  cont jakafi - patient has her own supply of 4 pills with her, informed her to give to the nurse so that we can administer the med to her  emailed Dr. Jacky Guo about admission  monitor for bleeding  B12/folate/ferritin were last checked 4/2021, all were wnl.     Problem/Plan - 3:  ·  Problem: ESRD (end stage renal disease).  Plan: cont MWF schedule  consult renal  cont nephro-deonna  cont phoslo.      Problem/Plan - 4:  ·  Problem: Splenic infarct.  Plan: and splenomegaly  since 2015, has been managed conservatively.      Problem/Plan - 5:  ·  Problem: Right upper quadrant abdominal pain.  Plan: CT A/P no clear pathology to explain this pain, will monitor.      Problem/Plan - 6:  Problem: Essential hypertension. Plan: cont propranolol with hold parameters.    plan for hd angel  d/c planning angel if renal and heme agree

## 2021-07-30 ENCOUNTER — TRANSCRIPTION ENCOUNTER (OUTPATIENT)
Age: 61
End: 2021-07-30

## 2021-07-30 DIAGNOSIS — R10.9 UNSPECIFIED ABDOMINAL PAIN: ICD-10-CM

## 2021-07-30 LAB
ANION GAP SERPL CALC-SCNC: 15 MMOL/L — SIGNIFICANT CHANGE UP (ref 5–17)
BILIRUB DIRECT SERPL-MCNC: 0.4 MG/DL — HIGH (ref 0–0.2)
BILIRUB INDIRECT FLD-MCNC: 2.3 MG/DL — HIGH (ref 0.2–1)
BILIRUB SERPL-MCNC: 2.7 MG/DL — HIGH (ref 0.2–1.2)
BUN SERPL-MCNC: 50 MG/DL — HIGH (ref 7–23)
CALCIUM SERPL-MCNC: 9.3 MG/DL — SIGNIFICANT CHANGE UP (ref 8.4–10.5)
CHLORIDE SERPL-SCNC: 95 MMOL/L — LOW (ref 96–108)
CO2 SERPL-SCNC: 24 MMOL/L — SIGNIFICANT CHANGE UP (ref 22–31)
CREAT SERPL-MCNC: 5.71 MG/DL — HIGH (ref 0.5–1.3)
GLUCOSE SERPL-MCNC: 92 MG/DL — SIGNIFICANT CHANGE UP (ref 70–99)
HAPTOGLOB SERPL-MCNC: 34 MG/DL — SIGNIFICANT CHANGE UP (ref 34–200)
HCT VFR BLD CALC: 25.1 % — LOW (ref 34.5–45)
HGB BLD-MCNC: 8 G/DL — LOW (ref 11.5–15.5)
LDH SERPL L TO P-CCNC: 332 U/L — HIGH (ref 50–242)
MCHC RBC-ENTMCNC: 31 PG — SIGNIFICANT CHANGE UP (ref 27–34)
MCHC RBC-ENTMCNC: 31.9 GM/DL — LOW (ref 32–36)
MCV RBC AUTO: 97.3 FL — SIGNIFICANT CHANGE UP (ref 80–100)
NRBC # BLD: 0 /100 WBCS — SIGNIFICANT CHANGE UP (ref 0–0)
PLATELET # BLD AUTO: 175 K/UL — SIGNIFICANT CHANGE UP (ref 150–400)
POTASSIUM SERPL-MCNC: 5.7 MMOL/L — HIGH (ref 3.5–5.3)
POTASSIUM SERPL-SCNC: 5.7 MMOL/L — HIGH (ref 3.5–5.3)
RBC # BLD: 2.58 M/UL — LOW (ref 3.8–5.2)
RBC # BLD: 3.01 M/UL — LOW (ref 3.8–5.2)
RBC # FLD: 19.9 % — HIGH (ref 10.3–14.5)
RETICS #: 85.8 K/UL — SIGNIFICANT CHANGE UP (ref 25–125)
RETICS/RBC NFR: 2.9 % — HIGH (ref 0.5–2.5)
SODIUM SERPL-SCNC: 134 MMOL/L — LOW (ref 135–145)
WBC # BLD: 7.06 K/UL — SIGNIFICANT CHANGE UP (ref 3.8–10.5)
WBC # FLD AUTO: 7.06 K/UL — SIGNIFICANT CHANGE UP (ref 3.8–10.5)

## 2021-07-30 PROCEDURE — 90935 HEMODIALYSIS ONE EVALUATION: CPT | Mod: GC

## 2021-07-30 PROCEDURE — 99222 1ST HOSP IP/OBS MODERATE 55: CPT | Mod: GC

## 2021-07-30 RX ORDER — SUCRALFATE 1 G
10 TABLET ORAL
Qty: 600 | Refills: 0
Start: 2021-07-30 | End: 2021-08-28

## 2021-07-30 RX ORDER — ERYTHROPOIETIN 10000 [IU]/ML
15000 INJECTION, SOLUTION INTRAVENOUS; SUBCUTANEOUS
Qty: 0 | Refills: 0 | DISCHARGE
Start: 2021-07-30

## 2021-07-30 RX ORDER — ERYTHROPOIETIN 10000 [IU]/ML
20000 INJECTION, SOLUTION INTRAVENOUS; SUBCUTANEOUS
Qty: 0 | Refills: 0 | DISCHARGE
Start: 2021-07-30

## 2021-07-30 RX ORDER — PANTOPRAZOLE SODIUM 20 MG/1
1 TABLET, DELAYED RELEASE ORAL
Qty: 30 | Refills: 0
Start: 2021-07-30 | End: 2021-08-28

## 2021-07-30 RX ADMIN — CHLORHEXIDINE GLUCONATE 1 APPLICATION(S): 213 SOLUTION TOPICAL at 14:09

## 2021-07-30 RX ADMIN — Medication 650 MILLIGRAM(S): at 02:29

## 2021-07-30 RX ADMIN — Medication 650 MILLIGRAM(S): at 03:15

## 2021-07-30 RX ADMIN — PANTOPRAZOLE SODIUM 40 MILLIGRAM(S): 20 TABLET, DELAYED RELEASE ORAL at 14:09

## 2021-07-30 RX ADMIN — SENNA PLUS 2 TABLET(S): 8.6 TABLET ORAL at 21:43

## 2021-07-30 RX ADMIN — Medication 1 GRAM(S): at 17:34

## 2021-07-30 RX ADMIN — ERYTHROPOIETIN 15000 UNIT(S): 10000 INJECTION, SOLUTION INTRAVENOUS; SUBCUTANEOUS at 11:20

## 2021-07-30 RX ADMIN — Medication 1334 MILLIGRAM(S): at 08:50

## 2021-07-30 RX ADMIN — Medication 1 GRAM(S): at 06:39

## 2021-07-30 RX ADMIN — Medication 1334 MILLIGRAM(S): at 17:34

## 2021-07-30 RX ADMIN — Medication 1334 MILLIGRAM(S): at 14:09

## 2021-07-30 RX ADMIN — Medication 1 TABLET(S): at 14:09

## 2021-07-30 NOTE — PROGRESS NOTE ADULT - PROBLEM SELECTOR PLAN 4
Patient with hyperphosphatemia - On phoslo with meals. Low phosphorus diet.  Monitor serum phosphorus.

## 2021-07-30 NOTE — DISCHARGE NOTE PROVIDER - NSDCMRMEDTOKEN_GEN_ALL_CORE_FT
calcium acetate 667 mg oral capsule: 2 tab(s) orally 3 times a day  gabapentin 100 mg oral capsule: 1 cap(s) orally 2 times a day  Jakafi 15 mg oral tablet: 1 tab(s) orally 3 times a week after HD  Nephro-Ben oral tablet: 1 tab(s) orally once a day  propranolol 10 mg oral tablet: 1 tab(s) orally once a day   calcium acetate 667 mg oral capsule: 2 tab(s) orally 3 times a day  epoetin filiberto: 73295 unit(s) intravenous Monday, Wednesday, and Friday at HD  gabapentin 100 mg oral capsule: 1 cap(s) orally 2 times a day  Jakafi 15 mg oral tablet: 1 tab(s) orally 3 times a week after HD  Nephro-Ben oral tablet: 1 tab(s) orally once a day  pantoprazole 40 mg oral delayed release tablet: 1 tab(s) orally once a day  propranolol 10 mg oral tablet: 1 tab(s) orally once a day  sucralfate 1 g/10 mL oral suspension: 10 milliliter(s) orally 2 times a day   calcium acetate 667 mg oral capsule: 2 tab(s) orally 3 times a day  epoetin filiberto: 23585 unit(s) intravenous Monday, Wednesday, and Friday at HD  gabapentin 100 mg oral capsule: 1 cap(s) orally 2 times a day  Jakafi 15 mg oral tablet: 1 tab(s) orally 3 times a week after HD  Nephro-Ben oral tablet: 1 tab(s) orally once a day  pantoprazole 40 mg oral delayed release tablet: 1 tab(s) orally 2 times a day   propranolol 10 mg oral tablet: 1 tab(s) orally once a day  sucralfate 1 g/10 mL oral suspension: 10 milliliter(s) orally 3 times a day

## 2021-07-30 NOTE — PROGRESS NOTE ADULT - ATTENDING COMMENTS
ESRD: SEEN at HD  Tolerating well  Continue with current prescription    Anemia:   On retacrit  Myelofibrosis    Abdominal Pain: w//u per Primary      Piedad Zhou MD  O: [phone]  C: [phone]

## 2021-07-30 NOTE — PROGRESS NOTE ADULT - SUBJECTIVE AND OBJECTIVE BOX
[facility] DIVISION OF KIDNEY DISEASES AND HYPERTENSION -- FOLLOW UP NOTE  -------------------------------------------------------------------------------  If any questions, please feel free to contact me  NS pager: [phone], [facility]: [phone]  Lincoln Altman M.D.  Nephrology Fellow    (After 5 pm or on weekends please page the on-call fellow)  -------------------------------------------------------------------------------    Chief Complaint:  Patient is a 61y old  Female who presents with a chief complaint of anemia (29 Jul 2021 16:39)    24 hour events/subjective:  Patient seen and examined at bedside this am, c/o Gastric reflux symptoms. No acute events overnight. Vitals/labs/imaging reviewed       PAST HISTORY  --------------------------------------------------------------------------------  No significant changes to PMH, PSH, FHx, SHx, unless otherwise noted    ALLERGIES & MEDICATIONS  --------------------------------------------------------------------------------  Allergies    No Known Allergies    Intolerances      Standing Inpatient Medications  calcium acetate 1334 milliGRAM(s) Oral three times a day with meals  chlorhexidine 2% Cloths 1 Application(s) Topical daily  epoetin filiberto-epbx (RETACRIT) Injectable 71896 Unit(s) IV Push <User Schedule>  Nephro-deonna 1 Tablet(s) Oral daily  pantoprazole   Suspension 40 milliGRAM(s) Oral daily  propranolol 10 milliGRAM(s) Oral daily  senna 2 Tablet(s) Oral at bedtime  sucralfate suspension 1 Gram(s) Oral two times a day    PRN Inpatient Medications  acetaminophen   Tablet .. 650 milliGRAM(s) Oral every 6 hours PRN  aluminum hydroxide/magnesium hydroxide/simethicone Suspension 30 milliLiter(s) Oral every 4 hours PRN  melatonin 3 milliGRAM(s) Oral at bedtime PRN  ondansetron Injectable 4 milliGRAM(s) IV Push every 8 hours PRN      REVIEW OF SYSTEMS  --------------------------------------------------------------------------------  Gen: No fevers/chills  Skin: No rashes  Head/Eyes/Ears: Normal hearing,   Respiratory: No dyspnea, cough  CV: No chest pain  GI: +dyspepsia,  No abdominal pain, diarrhea  : No dysuria, hematuria  MSK: No  edema    All other systems were reviewed and are negative, except as noted.    VITALS/PHYSICAL EXAM  --------------------------------------------------------------------------------  T(C): 36.9 (07-30-21 @ 01:26), Max: 37 (07-29-21 @ 15:52)  HR: 85 (07-30-21 @ 01:26) (81 - 85)  BP: 146/77 (07-30-21 @ 01:26) (136/84 - 146/77)  RR: 18 (07-30-21 @ 01:26) (18 - 18)  SpO2: 98% (07-30-21 @ 01:26) (98% - 98%)  Wt(kg): --          Physical Exam:  	Gen: NAD  	HEENT: MMM  	Pulm: CTA B/L  	CV: S1S2  	Abd: Soft, +BS   	Ext: No LE edema B/L  	Neuro: Awake  	Skin: Warm and dry             : no suprapubic tenderness    	Vascular access: left AVF +thrill and +bruit.     LABS/STUDIES  --------------------------------------------------------------------------------              8.5    7.95  >-----------<  196      [07-29-21 @ 07:05]              27.2     132  |  93  |  25  ----------------------------<  97      [07-29-21 @ 07:05]  4.8   |  25  |  3.90        Ca     9.7     [07-29-21 @ 07:05]    TPro  x   /  Alb  x   /  TBili  1.8  /  DBili  x   /  AST  x   /  ALT  x   /  AlkPhos  x   [07-29-21 @ 07:10]    PT/INR: PT 13.6 , INR 1.14       [07-29-21 @ 07:10]      Creatinine Trend:  SCr 3.90 [07-29 @ 07:05]  SCr 1.88 [07-28 @ 20:55]  SCr 5.27 [07-28 @ 07:08]  SCr 2.68 [07-26 @ 21:37]  SCr 4.78 [07-18 @ 01:40]        Iron 42, TIBC 109, %sat 38      [04-11-21 @ 11:26]  Ferritin 1198      [04-13-21 @ 10:06]  HbA1c 4.9      [06-01-19 @ 00:46]    HBsAb Reactive      [07-29-21 @ 00:09]  HBsAg Nonreact      [07-29-21 @ 00:09]  HBcAb Nonreact      [07-29-21 @ 00:09]  HCV 0.19, Nonreact      [07-29-21 @ 00:09]

## 2021-07-30 NOTE — PROGRESS NOTE ADULT - PROBLEM SELECTOR PLAN 2
Pt sent for low hgb of 6.9 - s/p PRBC transfusion  Currently asymptomatic -monitor h/h, transfuse for hgb<7  will continue retacrit 15K TIW with HD.

## 2021-07-30 NOTE — ED ADULT TRIAGE NOTE - TEMPERATURE IN CELSIUS (DEGREES C)
[facility]    Medicine Progress Note - Hospitalist Service       Date of Admission:  7/28/2021    Assessment & Plan                  Casey Martinez is a 66 year old gentleman with past medical history that is most notable for PE, prior GI bleeding, and chronic venous lower extremity ulcer wounds, as well as chronic Hepatitis C, among others; who presents with convulsive syncope with ongoing acute metabolic encephalopathy.      Syncope - possible orthostatic hypotension and/or polypharmacy  Concern of convulsive syncope with ongoing acute metabolic encephalopathy  Cause unclear. He could have had a seizure and now with post-ictal confusion. I have reviewed his records in care Everywhere and as yet am unable to find any prior history of seizures. It is unknown how much if any recent alcohol he may have had. His Head CT tonight shows possible age indeterminate cerebral infarcts and we should rule out stroke if possible. He was hypotensive initially but that seems to have resolved with IV fluid. He has leukopenia. We will rule out infectious causes of illness as able. He has chronic lower extremity ulcer wounds which do not currently appear infected. His COVID test was negative and UA was negative for UTI, CXR negative for pneumonia, and UDS showed only cannabinoids, making illicit ingestion of narcotics or cocaine less likely, though some prior notes document prior use of cocaine. He is on a number of medications as below that could lead to somnolence.   - MRI Brain and EEG pending - unremarkable for acute pathology or seizure activity  - Neurology consulted - signed off  - Telemetry unrevealing  - TTE ordered to rule out cardiogenic causes of syncope - EF normal, unremarkable  - Orthostatics ordered - mildly positive on DBP for change of 11mmhg - hold PTA ace/thiazide     Possible DASH - improving  Cr 1.68 and was 0.98 in 2/2021. Could be hypovolemic.  - Reassess after IV fluid given last  night (100 ml/hour NS)  - encourage PO intake   - creat 1.2 --> 0.9     Chronic pain  Hold neurontin, remeron, flexeril, benadryl, doxepin prn Oxycodone for now pending recovery of consciousness  - more awake and alert, unable to given med list confirmation however, providing prn oxy for now     Chronic anemia  Noting he has history of prior GI bleeding due to perforated ulcer reportedly. 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 The full details of all of this are not as yet known to me. Currently HGB is 11.3 and was 12.4 in 2//2021.  - Monitor while hospitalized, resume PPI for now - unable to verify     PE/DVT  Unclear if he is on Xarelto at present given above bleeding in the past. Reportedly he has an IVC Filter in place.  - telemetry ordered     Hypertension  Hold Zestoretic for DASH and now due to BP low/normal     Hypokalemia  Replacing. Check Magnesium and replace as well.     Chronic lower extremity venus ulcerations  WOC consulted     Rule Out COVID-19 infection  This patient was evaluated during a global COVID-19 pandemic, which necessitated consideration that the patient might be at risk for infection with the SARS-CoV-2 virus that causes COVID-19. Applicable protocols for evaluation were followed during the patient's care. Low suspicion for infection.   - negative COVID-19 PCR test result  - no current indication for precautions      Diet: Advance Diet as Tolerated: Regular Diet Adult  Diet    DVT Prophylaxis: Pneumatic Compression Devices  Zarco Catheter: Not present  Central Lines: None  Code Status: Full Code      Disposition Plan   Expected discharge: 07/30/2021 - unable to arrange ride tonight - will discharge tomorrow    The patient's care was discussed with the Bedside Nurse and Patient.    Lalo Huitron MD  Hospitalist Service  [facility]  Securely message with the Vocera Web Console (learn more here)  Text page via [employer] Paging/Directory      Clinically Significant  Risk Factors Present on Admission                   ______________________________________________________________________    Interval History   Seen and examined. Doing much better. Essentially unremarkable echo and mri. Hold anti HTN. Discharge tmrw with pcp follow up. Denies sob or new pain.    Data reviewed today: I reviewed all medications, new labs and imaging results over the last 24 hours. I personally reviewed no images or EKG's today.    Physical Exam   Vital Signs: Temp: 98  F (36.7  C) Temp src: Oral BP: 126/85 Pulse: 60   Resp: 16 SpO2: 99 % O2 Device: None (Room air)    Weight: 180 lbs 15.96 oz    Gen: NAD, pleasant  HEENT: Normocephalic, EOMI, MMM  Resp: no crackles,  no wheezes, no increased work of resp  CV: S1S2 heard, reg rhythm, reg rate, no pedal edema  Abdo: soft, nontender, nondistended, bowel sounds present  Ext: calves nontender, left LE wound is dressed  Neuro: AAOx3, CN grossly intact, no facial asymmetry      Data   Recent Labs   Lab 07/30/21  0741 07/29/21  0517 07/28/21  2138 07/28/21  2137   WBC 3.1* 4.5 3.2*  --    HGB 10.8* 10.6* 11.3*  --    MCV 88 88 88  --     191 191  --    INR  --   --   --  1.17*    142  --  141   POTASSIUM 4.1 3.8  3.8  --  3.3*   CHLORIDE 114* 114*  --  112*   CO2 26 24  --  25   BUN 10 16  --  21   CR 0.94 1.20  --  1.68*   ANIONGAP 2* 4  --  4   PHAN 8.5 8.5  --  8.9   GLC 81 95  --  122*   ALBUMIN  --  2.8*  --  3.1*   PROTTOTAL  --  6.2*  --  6.9   BILITOTAL  --  0.3  --  0.3   ALKPHOS  --  81  --  75   ALT  --  16  --  19   AST  --  17  --  17   TROPONIN  --   --   --  <0.015     Recent Results (from the past 24 hour(s))   MR Brain w/o & w Contrast    Narrative    MRI BRAIN WITHOUT AND WITH CONTRAST  7/30/2021 1:49 PM     HISTORY: Seizure, abnormal neurological exam .    TECHNIQUE: Multiplanar, multisequence MRI of the brain without and  with 8 mL Gadavist.      COMPARISON: Head CT 7/28/2021.     FINDINGS: No restricted diffusion to suggest  infarct. No mass effect  or midline shift. No evidence of acute intracranial hemorrhage.  Ventricular size is within normal limits without evidence of  hydrocephalus. Chronic appearing encephalomalacia and gliotic changes  at the anterior inferior left frontal lobe which are probably due to  previous trauma given the location. Probable dilated perivascular  spaces in the basal ganglia.    Hippocampi are fairly symmetric without evidence of T2 signal  abnormality or atrophy.    No abnormal intracranial enhancement.    The facial structures appear normal. The major arterial T2 flow voids  at the base of the brain appear patent.       Impression    IMPRESSION:    1. No evidence of acute infarct, mass, hemorrhage, or herniation.  2. Chronic-appearing region of encephalomalacia and gliosis in the  anteroinferior left frontal lobe which is probably due to previous  trauma given the location.       ALEX YIP MD         SYSTEM ID:  [id number]      36.8

## 2021-07-30 NOTE — PROGRESS NOTE ADULT - PROBLEM SELECTOR PLAN 1
Pt. with ESRD on HD three times a week (MWF). She follows with Dr De León at [facility] dialysis. Last outpatient HD was on 7/26 via left AVF. Pt. clinically stable. Labs reviewed. Will arrange for maintenance HD today. Monitor labs. Adjust meds to HD.

## 2021-07-30 NOTE — DISCHARGE NOTE PROVIDER - NSDCCPCAREPLAN_GEN_ALL_CORE_FT
PRINCIPAL DISCHARGE DIAGNOSIS  Diagnosis: Anemia  Assessment and Plan of Treatment: resolved      SECONDARY DISCHARGE DIAGNOSES  Diagnosis: Abdominal pain  Assessment and Plan of Treatment: stable, cont pantoprazole, carafate, follow up with PCP    Diagnosis: Essential hypertension  Assessment and Plan of Treatment: controlled, cont current home meds    Diagnosis: Polycythemia vera  Assessment and Plan of Treatment: stable    Diagnosis: CKD (chronic kidney disease)  Assessment and Plan of Treatment: stable, on HD, follow up with nephrologist     PRINCIPAL DISCHARGE DIAGNOSIS  Diagnosis: Anemia  Assessment and Plan of Treatment: Improved   Follow-up with your Hematolgist Dr. Meyer for further monitoring   follow-up with your PCP next week called for appointment      SECONDARY DISCHARGE DIAGNOSES  Diagnosis: ESRD (end stage renal disease)  Assessment and Plan of Treatment: Continue HD per Nephrology    Diagnosis: Polycythemia vera  Assessment and Plan of Treatment: stable  -continue medication as prescribed  Follow-up with Dr. Rico for further monitoring    Diagnosis: Essential hypertension  Assessment and Plan of Treatment: Low salt diet  Activity as tolerated.  Take all medication as prescribed.  Follow up with your medical doctor for routine blood pressure monitoring at your next visit.  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  -counseled on lifestyle modifications for GERD, including avoidance of spicy foods, timing of meals, positioning, and adherence to PPI 30 min prior to meals  Follow-up with your Gastroenterolgist for further monitoring

## 2021-07-30 NOTE — CONSULT NOTE ADULT - ATTENDING COMMENTS
I have seen and examined patient with the fellow and team  I agree with plan as delinated above
Patient seen and examined, agree with above. No imaging findings to explain intermittent epigastric pain. Would give PPI and monitor - if no improvement, consider repeat EGD for evaluation as pain is improved after eating (?PUD)
Piedad Zhou MD  O:   C: [phone]

## 2021-07-30 NOTE — DISCHARGE NOTE PROVIDER - PROVIDER TOKENS
PROVIDER:[TOKEN:[[id number]]],PROVIDER:[TOKEN:[[id number]]] PROVIDER:[TOKEN:[[id number]]],PROVIDER:[TOKEN:[[id number]]],PROVIDER:[TOKEN:[[id number]]]

## 2021-07-30 NOTE — DISCHARGE NOTE PROVIDER - NSDCCAREPROVSEEN_GEN_ALL_CORE_FT
Cole, Martín Stewart, Piedad Bejarano Cole, Martín Stewart, Laverne Zhou, Piedad Geiger, [facility]

## 2021-07-30 NOTE — CONSULT NOTE ADULT - ASSESSMENT
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  GI consulted for GERD and abdominal pain.    # GERD  # Abdominal pain, resolved  Patient does not endorse abdominal pain upon my interview, nor is she tender to palpation on exam.  She continues to endorse GERD-like symptoms [burning epigastric/substernal pain, worse with spicy foods, worse at night when laying down/reclined].    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    Recommendations:  -counseled on lifestyle modifications for GERD, including avoidance of spicy foods, timing of meals, positioning, and adherence to PPI 30 min prior to meals  -if symptoms persist, would optimize home GERD regimen and increase PPI to BID and sucralfate to four times daily as needed      Neymar Adams, PGY-4  GI/Hepatology Fellow    MONDAY-FRIDAY 8AM-5PM:  Available via [employer] Teams  Pager# [phone] ([facility]) or [phone] ([facility])  GI Phone# [phone] ([facility])    NON-URGENT CONSULTS:  Please email [email or web address] OR [email or web address]  AT NIGHT AND ON WEEKENDS:  Contact on-call GI fellow via answering service ([phone]) from 5pm-8am and on weekends/holidays   MIHYEON HU is a 61year old Female with history of HTN, GERD, ESRD, JAK2+ PV c/v secondary myelofibrosis and splenomegaly, splenic vein thrombosis, noncirrhotic portal hypertension on nonselective beta blocker c/b type 1 & 2 isolated gastric varices who presents with anemia.  GI consulted for GERD and abdominal pain.    # GERD  # Abdominal pain, resolved  Patient does not endorse abdominal pain upon my interview, nor is she tender to palpation on exam.  She continues to endorse GERD-like symptoms [burning epigastric/substernal pain, worse with spicy foods, worse at night when laying down/reclined].    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    Recommendations:  -counseled on lifestyle modifications for GERD, including avoidance of spicy foods, timing of meals, positioning, and adherence to PPI 30 min prior to meals  -would optimize home GERD regimen and increase PPI to BID and sucralfate to four times daily as needed  -if symptoms worsen or persist, would consider repeat endoscopy pending clinical course      Neymar Adams, PGY-4  GI/Hepatology Fellow    MONDAY-FRIDAY 8AM-5PM:  Available via [employer] Teams  Pager# [phone] ([facility]) or [phone] ([facility])  GI Phone# [phone] ([facility])    NON-URGENT CONSULTS:  Please email [email or web address] OR [email or web address]  AT NIGHT AND ON WEEKENDS:  Contact on-call GI fellow via answering service ([phone]) from 5pm-8am and on weekends/holidays   MIHYEON HU is a 61year old Female with history of HTN, GERD, ESRD, JAK2+ PV c/v secondary myelofibrosis and splenomegaly, splenic vein thrombosis, noncirrhotic portal hypertension on nonselective beta blocker c/b type 1 & 2 isolated gastric varices who presents with anemia.  GI consulted for GERD and abdominal pain.    # GERD  # Abdominal pain, resolved today  Patient does not endorse abdominal pain upon my interview, nor is she tender to palpation on exam.  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    Recommendations:  -counseled on lifestyle modifications for GERD, including avoidance of spicy foods, timing of meals, positioning, and adherence to PPI 30 min prior to meals  -would optimize home GERD regimen and increase PPI to BID and sucralfate to four times daily as needed  -if symptoms worsen or persist, would consider repeat endoscopy pending clinical course, next week if patient is still inpatient      Neymar Adams, PGY-4  GI/Hepatology Fellow    MONDAY-FRIDAY 8AM-5PM:  Available via [employer] Teams  Pager# [phone] ([facility]) or [phone] ([facility])  GI Phone# [phone] ([facility])    NON-URGENT CONSULTS:  Please email [email or web address] OR [email or web address]  AT NIGHT AND ON WEEKENDS:  Contact on-call GI fellow via answering service ([phone]) from 5pm-8am and on weekends/holidays

## 2021-07-30 NOTE — DISCHARGE NOTE PROVIDER - CARE PROVIDER_API CALL
Azucena De León)  Internal Medicine; Nephrology  [address] [facility] [address] [place] [address]  Phone: [phone]  Fax: [phone]  Follow Up Time:     Jacky uGo)  Hematology; Internal Medicine; Oncology  [address] [place], [place] [id number]  Phone: [phone]  Fax: [phone]  Follow Up Time:    Azucena De León)  Internal Medicine; Nephrology  [address] [facility] [address] [place] [address]  Phone: [phone]  Fax: [phone]  Follow Up Time:     Jacky Guo)  Hematology; Internal Medicine; Oncology  [address] [place], [place] [id number]  Phone: [phone]  Fax: [phone]  Follow Up Time:     Joseph Pastor)  Gastroenterology; Internal Medicine  [address] [place] [address]  Phone: [phone]  Fax: [phone]  Follow Up Time:

## 2021-07-30 NOTE — DISCHARGE NOTE PROVIDER - CARE PROVIDERS DIRECT ADDRESSES
,[email or web address] ,[email or web address] [email or web address]

## 2021-07-30 NOTE — CONSULT NOTE ADULT - SUBJECTIVE AND OBJECTIVE BOX
Chief Complaint:  Patient is a 61y old  Female who presents with a chief complaint of anemia (2021 12:54)      HPI:  MIHYEON HU is a 61year old Female with history of HTN, GERD, ESRD, JAK2+ PV c/v secondary myelofibrosis and splenomegaly, splenic vein thrombosis, noncirrhotic portal hypertension on nonselective beta blocker c/b type 1 & 2 isolated gastric varices who presents with anemia.  GI consulted for GERD and abdominal pain.    Patient follows with Hepatologist Dr. Guo for non-cirrhotic portal hypertension, nodular regenerative hyperplasia, and isolated gastric varices.  She is on a nonselective beta blocker.  She had recent EGD on 2021 which revealed portal hypertensive gastropathy and type 1 & 2 isolated gastric varices not amenable to intervention and sclerotherapy.  Of note, patient has had a colonoscopy in 2016 that was unremarkable aside from a 4mm sigmoid polyp.    Upon my interview during initial consultation, patient is eating lunch and states she currently has no abdominal pain.  She states she continues to have burning epigastric/substernal pain that is associated with eating and worse at night.  She states she is accustomed to eating spicy [place] foods at home, but has been cutting back lately.  She takes Protonix 40mg daily and sucralfate 10mg twice daily at home.  Otherwise, patient denies fevers, chills, weight loss, dysphagia, odynophagia, regurgitation, difficulty handling secretions/saliva, early satiety, poor oral intake, nausea, vomiting, diarrhea, melena, hematemesis, hematochezia, change in stool caliber, or family history of GI-related cancers.  This admission, patient has had CT A/P with stable splenomegaly and splenic infarcts and RUQ US with dopplers that shows splenomegaly and otherwise negative.    ROS:   General:  No fevers, chills, night sweats  Eyes:  Good vision, no reported pain  ENT:  No sore throat, pain, runny nose  CV:  No pain, palpitations  Pulm:  No dyspnea, cough  GI:  See HPI, otherwise negative  :  No incontinence, nocturia  Muscle:  No reported pain, weakness  Neuro:  No memory problems  Psych:  No insomnia, psychosis  Endocrine:  No polyuria, polydipsia  Heme:  No petechiae, ecchymosis, easy bruisability  Skin:  No reported rash    PMHX/PSHX:    Polycythemia vera    Peptic ulcer disease    Hypertension    Splenomegaly    Splenic infarct    ESRD on peritoneal dialysis    Cirrhosis of liver without ascites, unspecified hepatic cirrhosis type    Pancreatic cyst    No significant past surgical history    Peritoneal dialysis catheter in place    Hemoperitoneum as complication of peritoneal dialysis    AV fistula      Allergies:  No Known Allergies      Home Medications: reviewed  Hospital Medications:  acetaminophen   Tablet .. 650 milliGRAM(s) Oral every 6 hours PRN  aluminum hydroxide/magnesium hydroxide/simethicone Suspension 30 milliLiter(s) Oral every 4 hours PRN  calcium acetate 1334 milliGRAM(s) Oral three times a day with meals  chlorhexidine 2% Cloths 1 Application(s) Topical daily  epoetin filiberto-epbx (RETACRIT) Injectable 42030 Unit(s) IV Push <User Schedule>  melatonin 3 milliGRAM(s) Oral at bedtime PRN  Nephro-deonna 1 Tablet(s) Oral daily  ondansetron Injectable 4 milliGRAM(s) IV Push every 8 hours PRN  pantoprazole   Suspension 40 milliGRAM(s) Oral daily  propranolol 10 milliGRAM(s) Oral daily  senna 2 Tablet(s) Oral at bedtime  sucralfate suspension 1 Gram(s) Oral two times a day      Social History:   Tobacco: Denies  EtOH: Denies  Illicit Drugs: Denies    Family history:    No pertinent family history in first degree relatives    Family history of hypertension in mother    Family history of malignant neoplasm (Grandparent)    FH: cirrhosis (Sibling)      Denies family history of colon cancer/polyps, stomach cancer/polyps, pancreatic cancer/masses, liver cancer/disease, ovarian cancer and endometrial cancer.    PHYSICAL EXAM:   Vital Signs:  Vital Signs Last 24 Hrs  T(C): 36.8 (2021 13:20), Max: 37 (2021 15:52)  T(F): 98.3 (2021 13:20), Max: 98.6 (2021 15:52)  HR: 90 (2021 13:20) (81 - 93)  BP: 121/79 (2021 13:20) (103/62 - 146/77)  BP(mean): --  RR: 16 (2021 13:20) (16 - 18)  SpO2: 100% (2021 13:20) (93% - 100%)  Daily     Daily Weight in k.4 (2021 12:50)    GENERAL: no acute distress  NEURO: alert  HEENT: anicteric sclera, no conjunctival pallor appreciated  CHEST: no respiratory distress, no accessory muscle use  CARDIAC: regular rate, +S1/S2  ABDOMEN: soft, nondistended, nontender, no rebound or guarding  EXTREMITIES: warm, well perfused, no edema  SKIN: no lesions noted    LABS: reviewed                        8.0    7.06  )-----------( 175      ( 2021 08:29 )             25.1     07    134<L>  |  95<L>  |  50<H>  ----------------------------<  92  5.7<H>   |  24  |  5.71<H>    Ca    9.3      2021 08:28    TPro  x   /  Alb  x   /  TBili  1.8<H>  /  DBili  x   /  AST  x   /  ALT  x   /  AlkPhos  x             Diagnostic Studies: see sunrise for full report         Chief Complaint:  Patient is a 61y old  Female who presents with a chief complaint of anemia (2021 12:54)      HPI:  MIHYEON HU is a 61year old Female with history of HTN, GERD, ESRD, JAK2+ PV c/v secondary myelofibrosis and splenomegaly, splenic vein thrombosis, noncirrhotic portal hypertension on nonselective beta blocker c/b type 1 & 2 isolated gastric varices who presents with anemia.  GI consulted for GERD and abdominal pain.    Patient follows with Hepatologist Dr. Guo for non-cirrhotic portal hypertension, nodular regenerative hyperplasia, and isolated gastric varices.  She is on a nonselective beta blocker.  She had recent EGD on 2021 which revealed portal hypertensive gastropathy and type 1 & 2 isolated gastric varices not amenable to intervention and sclerotherapy.  Of note, patient has had a colonoscopy in 2016 that was unremarkable aside from a 4mm sigmoid polyp.    Upon my interview during initial consultation, patient is eating lunch and states she currently has no abdominal pain.  She states she continues to have burning epigastric/substernal pain that is associated with eating and worse at night.  She states she is accustomed to eating spicy [place] foods at home, but has been cutting back lately. States the pain is better after eating. She takes Protonix 40mg daily and sucralfate 10mg twice daily at home.  Otherwise, patient denies fevers, chills, weight loss, dysphagia, odynophagia, regurgitation, difficulty handling secretions/saliva, early satiety, poor oral intake, nausea, vomiting, diarrhea, melena, hematemesis, hematochezia, change in stool caliber, or family history of GI-related cancers.  This admission, patient has had CT A/P with stable splenomegaly and splenic infarcts and RUQ US with dopplers that shows splenomegaly and otherwise negative.    ROS:   General:  No fevers, chills, night sweats  Eyes:  Good vision, no reported pain  ENT:  No sore throat, pain, runny nose  CV:  No pain, palpitations  Pulm:  No dyspnea, cough  GI:  See HPI, otherwise negative  :  No incontinence, nocturia  Muscle:  No reported pain, weakness  Neuro:  No memory problems  Psych:  No insomnia, psychosis  Endocrine:  No polyuria, polydipsia  Heme:  No petechiae, ecchymosis, easy bruisability  Skin:  No reported rash    PMHX/PSHX:    Polycythemia vera    Peptic ulcer disease    Hypertension    Splenomegaly    Splenic infarct    ESRD on peritoneal dialysis    Cirrhosis of liver without ascites, unspecified hepatic cirrhosis type    Pancreatic cyst    No significant past surgical history    Peritoneal dialysis catheter in place    Hemoperitoneum as complication of peritoneal dialysis    AV fistula      Allergies:  No Known Allergies      Home Medications: reviewed  Hospital Medications:  acetaminophen   Tablet .. 650 milliGRAM(s) Oral every 6 hours PRN  aluminum hydroxide/magnesium hydroxide/simethicone Suspension 30 milliLiter(s) Oral every 4 hours PRN  calcium acetate 1334 milliGRAM(s) Oral three times a day with meals  chlorhexidine 2% Cloths 1 Application(s) Topical daily  epoetin filiberto-epbx (RETACRIT) Injectable 50112 Unit(s) IV Push <User Schedule>  melatonin 3 milliGRAM(s) Oral at bedtime PRN  Nephro-deonna 1 Tablet(s) Oral daily  ondansetron Injectable 4 milliGRAM(s) IV Push every 8 hours PRN  pantoprazole   Suspension 40 milliGRAM(s) Oral daily  propranolol 10 milliGRAM(s) Oral daily  senna 2 Tablet(s) Oral at bedtime  sucralfate suspension 1 Gram(s) Oral two times a day      Social History:   Tobacco: Denies  EtOH: Denies  Illicit Drugs: Denies    Family history:    No pertinent family history in first degree relatives    Family history of hypertension in mother    Family history of malignant neoplasm (Grandparent)    FH: cirrhosis (Sibling)      Denies family history of colon cancer/polyps, stomach cancer/polyps, pancreatic cancer/masses, liver cancer/disease, ovarian cancer and endometrial cancer.    PHYSICAL EXAM:   Vital Signs:  Vital Signs Last 24 Hrs  T(C): 36.8 (2021 13:20), Max: 37 (2021 15:52)  T(F): 98.3 (2021 13:20), Max: 98.6 (2021 15:52)  HR: 90 (2021 13:20) (81 - 93)  BP: 121/79 (2021 13:20) (103/62 - 146/77)  BP(mean): --  RR: 16 (2021 13:20) (16 - 18)  SpO2: 100% (2021 13:20) (93% - 100%)  Daily     Daily Weight in k.4 (2021 12:50)    GENERAL: no acute distress  NEURO: alert  HEENT: anicteric sclera, no conjunctival pallor appreciated  CHEST: no respiratory distress, no accessory muscle use  CARDIAC: regular rate, +S1/S2  ABDOMEN: soft, nondistended, nontender, no rebound or guarding  EXTREMITIES: warm, well perfused, no edema  SKIN: no lesions noted    LABS: reviewed                        8.0    7.06  )-----------( 175      ( 2021 08:29 )             25.1     07    134<L>  |  95<L>  |  50<H>  ----------------------------<  92  5.7<H>   |  24  |  5.71<H>    Ca    9.3      2021 08:28    TPro  x   /  Alb  x   /  TBili  1.8<H>  /  DBili  x   /  AST  x   /  ALT  x   /  AlkPhos  x             Diagnostic Studies: see sunrise for full report  < from: CT Abdomen and Pelvis No Cont (21 @ 04:35) >  FINDINGS:  Unchanged linear atelectasis in both lower lobes. The heart is not enlarged.    Evaluation of the solid organs and vasculature is limited without the administration of intravenous contrast.    The spleen is again enlarged with peripheral wedge-shaped areas of low-attenuation in the adjacent subcapsular low density fluid compatible with stable small infarcts. The unenhanced appearance of the liver, gallbladder, pancreas and adrenal glands unremarkable aside from tiny gallstones. The kidneys are atrophic. No hydronephrosis or perinephric stranding.    There is no ascites or pneumoperitoneum.  There is no bowel obstruction or wall thickening. The appendix is normal. Stable duodenal diverticulum.    There is no abdominal or pelvic lymphadenopathy. The abdominal aorta is normal in caliber.    The urinary bladder is partially distended but otherwise unremarkable. The uterus and adnexal structures are unremarkable.    There are no acute osseous abnormalities.    IMPRESSION:  Stable splenomegaly and small peripheral splenic infarcts allowing for lack of IV contrast.  No acute findings on this noncontrast abdomen/pelvic CT to explain the patient's symptomatology.    < end of copied text >      < from: US Abdomen Doppler (21 @ 08:48) >  IMPRESSION:    No evidence of vascular thrombosis.    Splenomegaly.    < end of copied text >

## 2021-07-30 NOTE — PROGRESS NOTE ADULT - SUBJECTIVE AND OBJECTIVE BOX
MEDICINE, PROGRESS NOTE [phone]    HU, MIHYEON 61y [id number]    Patient seen and examined.  Pt had abd pain overnight and took tylenol with minimal relief. Pt not eating much due to pain and not liking the food.  Patient is a 61y old  Female who presents with a chief complaint of anemia (30 Jul 2021 10:07)      PAST MEDICAL & SURGICAL HISTORY:  Polycythemia vera  and secondary myelofibrosis    Peptic ulcer disease    Hypertension    Splenomegaly  2015    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    Cirrhosis of liver without ascites, unspecified hepatic cirrhosis type    Pancreatic cyst    Peritoneal dialysis catheter in place  Placed 8/9/2017    Hemoperitoneum as complication of peritoneal dialysis  s/p removal 9/18    AV fistula  Placed 9/18      MEDICATIONS  (STANDING):  calcium acetate 1334 milliGRAM(s) Oral three times a day with meals  chlorhexidine 2% Cloths 1 Application(s) Topical daily  epoetin filiberto-epbx (RETACRIT) Injectable 86017 Unit(s) IV Push <User Schedule>  Nephro-deonna 1 Tablet(s) Oral daily  pantoprazole   Suspension 40 milliGRAM(s) Oral daily  propranolol 10 milliGRAM(s) Oral daily  senna 2 Tablet(s) Oral at bedtime  sucralfate suspension 1 Gram(s) Oral two times a day    MEDICATIONS  (PRN):  acetaminophen   Tablet .. 650 milliGRAM(s) Oral every 6 hours PRN Temp greater or equal to 38.5C (101.3F), Mild Pain (1 - 3)  aluminum hydroxide/magnesium hydroxide/simethicone Suspension 30 milliLiter(s) Oral every 4 hours PRN Dyspepsia  melatonin 3 milliGRAM(s) Oral at bedtime PRN Insomnia  ondansetron Injectable 4 milliGRAM(s) IV Push every 8 hours PRN Nausea and/or Vomiting    Allergies    No Known Allergies    Intolerances        PHYSICAL EXAM:  Constitutional: NAD  HEENT: Normocephalic, EOMI  Neck:  No JVD  Respiratory: CTA B/L, No wheezes  Cardiovascular: S1, S2, RRR,  Gastrointestinal: BS+, soft, ND, epigastric tenderness  Extremities: No peripheral edema le b/l, pp+  Neurological: AAOX3, no focal deficits  Psychiatric: Normal mood, normal affect  : No Stoddard    Vital Signs Last 24 Hrs  T(C): 36.4 (30 Jul 2021 09:15), Max: 37 (29 Jul 2021 15:52)  T(F): 97.5 (30 Jul 2021 09:15), Max: 98.6 (29 Jul 2021 15:52)  HR: 82 (30 Jul 2021 09:15) (81 - 93)  BP: 143/84 (30 Jul 2021 09:15) (110/62 - 146/77)  BP(mean): --  RR: 18 (30 Jul 2021 09:15) (18 - 18)  SpO2: 100% (30 Jul 2021 09:15) (93% - 100%)  I&O's Summary      LABS:                        8.0    7.06  )-----------( 175      ( 30 Jul 2021 08:29 )             25.1     07-30    134<L>  |  95<L>  |  50<H>  ----------------------------<  92  5.7<H>   |  24  |  5.71<H>    Ca    9.3      30 Jul 2021 08:28    TPro  x   /  Alb  x   /  TBili  1.8<H>  /  DBili  x   /  AST  x   /  ALT  x   /  AlkPhos  x   07-29              REVIEW OF SYSTEMS:  no ha or dizziness  no cp or palpitations  no sob or cough  no dysuria or hematuria  no v/d today, + nausea, + abd pain  no rash or itchiness  all other reviewed systems were negative.            ASSESSMENT/PLAN:  60 yo female w/pmh ESRD on HD MWF (chronic GN), HTN, MAK 2 + polycythemia vera dx 2012, complicated by splenomegaly and history of splenic vein thrombosis 2015, now progressing to secondary myelofibrosis, presents to [facility] after being called by her hematologist for a recent hgb of 6.9.     Problem/Plan - 1:  ·  Problem: Polycythemia vera.  Plan: with secondary myelofibrosis  - cont jakafi - patient needs to bring her own from home (non-formulary).      Problem/Plan - 2:  ·  Problem: Anemia.  Plan: due to myelofibrosis  monitor hgb daily  cont jakafi - patient has her own supply of 4 pills with her, informed her to give to the nurse so that we can administer the med to her  emailed Dr. Jacky Guo about admission  monitor for bleeding  B12/folate/ferritin were last checked 4/2021, all were wnl.     Problem/Plan - 3:  ·  Problem: ESRD (end stage renal disease).  Plan: cont MWF schedule  consult renal  cont nephro-deonna  cont phoslo.      Problem/Plan - 4:  ·  Problem: Splenic infarct.  Plan: and splenomegaly  since 2015, has been managed conservatively.      Problem/Plan - 5:  ·  Problem: Right upper quadrant abdominal pain.  Plan: CT A/P no clear pathology to explain this pain, will monitor.      Problem/Plan - 6:  Problem: Essential hypertension. Plan: cont propranolol with hold parameters.    GI eval for abd pain - per heme  continue current care  hemolysis w/u - given persistent hyperkalemia  heme f/u         MEDICINE, PROGRESS NOTE [phone]    HU, MIHYEON 61y [id number]    Patient seen and examined.  Pt had abd pain overnight and took tylenol with minimal relief. Pt not eating much due to pain and not liking the food.  Patient is a 61y old  Female who presents with a chief complaint of anemia (30 Jul 2021 10:07)      PAST MEDICAL & SURGICAL HISTORY:  Polycythemia vera  and secondary myelofibrosis    Peptic ulcer disease    Hypertension    Splenomegaly  2015    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    Cirrhosis of liver without ascites, unspecified hepatic cirrhosis type    Pancreatic cyst    Peritoneal dialysis catheter in place  Placed 8/9/2017    Hemoperitoneum as complication of peritoneal dialysis  s/p removal 9/18    AV fistula  Placed 9/18      MEDICATIONS  (STANDING):  calcium acetate 1334 milliGRAM(s) Oral three times a day with meals  chlorhexidine 2% Cloths 1 Application(s) Topical daily  epoetin filiberto-epbx (RETACRIT) Injectable 51594 Unit(s) IV Push <User Schedule>  Nephro-deonna 1 Tablet(s) Oral daily  pantoprazole   Suspension 40 milliGRAM(s) Oral daily  propranolol 10 milliGRAM(s) Oral daily  senna 2 Tablet(s) Oral at bedtime  sucralfate suspension 1 Gram(s) Oral two times a day    MEDICATIONS  (PRN):  acetaminophen   Tablet .. 650 milliGRAM(s) Oral every 6 hours PRN Temp greater or equal to 38.5C (101.3F), Mild Pain (1 - 3)  aluminum hydroxide/magnesium hydroxide/simethicone Suspension 30 milliLiter(s) Oral every 4 hours PRN Dyspepsia  melatonin 3 milliGRAM(s) Oral at bedtime PRN Insomnia  ondansetron Injectable 4 milliGRAM(s) IV Push every 8 hours PRN Nausea and/or Vomiting    Allergies    No Known Allergies    Intolerances        PHYSICAL EXAM:  Constitutional: NAD  HEENT: Normocephalic, EOMI  Neck:  No JVD  Respiratory: CTA B/L, No wheezes  Cardiovascular: S1, S2, RRR,  Gastrointestinal: BS+, soft, ND, epigastric tenderness  Extremities: No peripheral edema le b/l, pp+  Neurological: AAOX3, no focal deficits  Psychiatric: Normal mood, normal affect  : No Stoddard    Vital Signs Last 24 Hrs  T(C): 36.4 (30 Jul 2021 09:15), Max: 37 (29 Jul 2021 15:52)  T(F): 97.5 (30 Jul 2021 09:15), Max: 98.6 (29 Jul 2021 15:52)  HR: 82 (30 Jul 2021 09:15) (81 - 93)  BP: 143/84 (30 Jul 2021 09:15) (110/62 - 146/77)  BP(mean): --  RR: 18 (30 Jul 2021 09:15) (18 - 18)  SpO2: 100% (30 Jul 2021 09:15) (93% - 100%)  I&O's Summary      LABS:                        8.0    7.06  )-----------( 175      ( 30 Jul 2021 08:29 )             25.1     07-30    134<L>  |  95<L>  |  50<H>  ----------------------------<  92  5.7<H>   |  24  |  5.71<H>    Ca    9.3      30 Jul 2021 08:28    TPro  x   /  Alb  x   /  TBili  1.8<H>  /  DBili  x   /  AST  x   /  ALT  x   /  AlkPhos  x   07-29              REVIEW OF SYSTEMS:  no ha or dizziness  no cp or palpitations  no sob or cough  no dysuria or hematuria  no v/d today, + nausea, + abd pain  no rash or itchiness  all other reviewed systems were negative.            ASSESSMENT/PLAN:  60 yo female w/pmh ESRD on HD MWF (chronic GN), HTN, MAK 2 + polycythemia vera dx 2012, complicated by splenomegaly and history of splenic vein thrombosis 2015, now progressing to secondary myelofibrosis, presents to [facility] after being called by her hematologist for a recent hgb of 6.9.     Problem/Plan - 1:  ·  Problem: Polycythemia vera.  Plan: with secondary myelofibrosis  - cont jakafi - patient needs to bring her own from home (non-formulary).      Problem/Plan - 2:  ·  Problem: Anemia.  Plan: due to myelofibrosis  monitor hgb daily  cont jakafi - patient has her own supply of 4 pills with her, informed her to give to the nurse so that we can administer the med to her  emailed Dr. Jacky Guo about admission  monitor for bleeding  B12/folate/ferritin were last checked 4/2021, all were wnl.     Problem/Plan - 3:  ·  Problem: ESRD (end stage renal disease).  Plan: cont MWF schedule  consult renal  cont nephro-deonna  cont phoslo.      Problem/Plan - 4:  ·  Problem: Splenic infarct.  Plan: and splenomegaly  since 2015, has been managed conservatively.      Problem/Plan - 5:  ·  Problem: Right upper quadrant abdominal pain.  Plan: CT A/P no clear pathology to explain this pain, will monitor.      Problem/Plan - 6:  Problem: Essential hypertension. Plan: cont propranolol with hold parameters.    GI eval for abd pain - per heme  continue current care  hemolysis w/u - given persistent hyperkalemia  heme f/u  discussed with heme fellow and [place]

## 2021-07-30 NOTE — DISCHARGE NOTE PROVIDER - HOSPITAL COURSE
60 yo female w/pmh ESRD on HD MWF (chronic GN), HTN, MAK 2 + polycythemia vera dx 2012, complicated by splenomegaly and history of splenic vein thrombosis 2015, now progressing to secondary myelofibrosis, presents to [facility] after being called by her hematologist for a recent hgb of 6.9. She got 1u prbc in the ER. She endorsed dizziness, lightheadedness, but those symptoms are better after transfusion. Also has chronic left upper quadrant pain which she says is now progressing to right upper quadrant pain, and this is nwe. Not associated with food. Last went to dialysis yesterday. Denies dysuria. Denies any bleeding, blood in stools, or dark stools. CT A/P without contrast obtained in ER showed splenomegaly and splenic infarcts but no other new pathology.   She received one unit of PRBC and h/h is 8/25.7 today. She is hemodynamically stable to be discharged home today, spoke to hm/onc, renal and Attending. 62 yo female w/pmh ESRD on HD MWF (chronic GN), HTN, MAK 2 + polycythemia vera dx 2012, complicated by splenomegaly and history of splenic vein thrombosis 2015, now progressing to secondary myelofibrosis, presents to [facility] after being called by her hematologist for a recent hgb of 6.9. She got 1u prbc in the ER. She endorsed dizziness, lightheadedness, but those symptoms are better after transfusion. Also has chronic left upper quadrant pain which she says is now progressing to right upper quadrant pain, and this is nwe. Not associated with food. Last went to dialysis yesterday. Denies dysuria. Denies any bleeding, blood in stools, or dark stools. CT A/P without contrast obtained in ER showed splenomegaly and splenic infarcts but no other new pathology.   She received one unit of PRBC and h/h is 8/25.7 today. She is hemodynamically stable to be discharged home today, spoke to hm/onc, renal and Attending.      Patient reported abdomen pain has significantly improved, denies n/v. Hgb 7.9, outpt monitoring, no transfusion at this time per Dr. Geiger. Hematology has cleared patient for discharge home with outpt follow-up. Seen and examined pt at the bedside -NAD noted. Dr. Cabello has medically cleared patient for discharge home with f/u as advised. Per DELGADO all HD documents forwarded to pt's outpt HD center to continue with HD tomorrow.

## 2021-07-31 ENCOUNTER — TRANSCRIPTION ENCOUNTER (OUTPATIENT)
Age: 61
End: 2021-07-31

## 2021-07-31 VITALS
TEMPERATURE: 98 F | OXYGEN SATURATION: 97 % | RESPIRATION RATE: 18 BRPM | SYSTOLIC BLOOD PRESSURE: 123 MMHG | HEART RATE: 92 BPM | DIASTOLIC BLOOD PRESSURE: 75 MMHG

## 2021-07-31 LAB
ALBUMIN SERPL ELPH-MCNC: 4.2 G/DL — SIGNIFICANT CHANGE UP (ref 3.3–5)
ALP SERPL-CCNC: 142 U/L — HIGH (ref 40–120)
ALT FLD-CCNC: 5 U/L — LOW (ref 10–45)
ANION GAP SERPL CALC-SCNC: 13 MMOL/L — SIGNIFICANT CHANGE UP (ref 5–17)
AST SERPL-CCNC: 12 U/L — SIGNIFICANT CHANGE UP (ref 10–40)
BILIRUB SERPL-MCNC: 2.2 MG/DL — HIGH (ref 0.2–1.2)
BUN SERPL-MCNC: 30 MG/DL — HIGH (ref 7–23)
CALCIUM SERPL-MCNC: 9.2 MG/DL — SIGNIFICANT CHANGE UP (ref 8.4–10.5)
CHLORIDE SERPL-SCNC: 98 MMOL/L — SIGNIFICANT CHANGE UP (ref 96–108)
CO2 SERPL-SCNC: 24 MMOL/L — SIGNIFICANT CHANGE UP (ref 22–31)
CREAT SERPL-MCNC: 4.4 MG/DL — HIGH (ref 0.5–1.3)
GLUCOSE SERPL-MCNC: 98 MG/DL — SIGNIFICANT CHANGE UP (ref 70–99)
HAPTOGLOB SERPL-MCNC: 41 MG/DL — SIGNIFICANT CHANGE UP (ref 34–200)
HCT VFR BLD CALC: 23.6 % — LOW (ref 34.5–45)
HCT VFR BLD CALC: 25.7 % — LOW (ref 34.5–45)
HGB BLD-MCNC: 7.3 G/DL — LOW (ref 11.5–15.5)
HGB BLD-MCNC: 7.9 G/DL — LOW (ref 11.5–15.5)
LDH SERPL L TO P-CCNC: 305 U/L — HIGH (ref 50–242)
MCHC RBC-ENTMCNC: 30.5 PG — SIGNIFICANT CHANGE UP (ref 27–34)
MCHC RBC-ENTMCNC: 30.7 GM/DL — LOW (ref 32–36)
MCHC RBC-ENTMCNC: 30.8 PG — SIGNIFICANT CHANGE UP (ref 27–34)
MCHC RBC-ENTMCNC: 30.9 GM/DL — LOW (ref 32–36)
MCV RBC AUTO: 99.2 FL — SIGNIFICANT CHANGE UP (ref 80–100)
MCV RBC AUTO: 99.6 FL — SIGNIFICANT CHANGE UP (ref 80–100)
NRBC # BLD: 0 /100 WBCS — SIGNIFICANT CHANGE UP (ref 0–0)
NRBC # BLD: 0 /100 WBCS — SIGNIFICANT CHANGE UP (ref 0–0)
PLATELET # BLD AUTO: 142 K/UL — LOW (ref 150–400)
PLATELET # BLD AUTO: 142 K/UL — LOW (ref 150–400)
POTASSIUM SERPL-MCNC: 4.8 MMOL/L — SIGNIFICANT CHANGE UP (ref 3.5–5.3)
POTASSIUM SERPL-SCNC: 4.8 MMOL/L — SIGNIFICANT CHANGE UP (ref 3.5–5.3)
PROT SERPL-MCNC: 7.5 G/DL — SIGNIFICANT CHANGE UP (ref 6–8.3)
RBC # BLD: 2.37 M/UL — LOW (ref 3.8–5.2)
RBC # BLD: 2.59 M/UL — LOW (ref 3.8–5.2)
RBC # BLD: 2.92 M/UL — LOW (ref 3.8–5.2)
RBC # FLD: 19.9 % — HIGH (ref 10.3–14.5)
RBC # FLD: 19.9 % — HIGH (ref 10.3–14.5)
RETICS #: 79.7 K/UL — SIGNIFICANT CHANGE UP (ref 25–125)
RETICS/RBC NFR: 2.7 % — HIGH (ref 0.5–2.5)
SODIUM SERPL-SCNC: 135 MMOL/L — SIGNIFICANT CHANGE UP (ref 135–145)
WBC # BLD: 6.49 K/UL — SIGNIFICANT CHANGE UP (ref 3.8–10.5)
WBC # BLD: 7.62 K/UL — SIGNIFICANT CHANGE UP (ref 3.8–10.5)
WBC # FLD AUTO: 6.49 K/UL — SIGNIFICANT CHANGE UP (ref 3.8–10.5)
WBC # FLD AUTO: 7.62 K/UL — SIGNIFICANT CHANGE UP (ref 3.8–10.5)

## 2021-07-31 PROCEDURE — 85027 COMPLETE CBC AUTOMATED: CPT

## 2021-07-31 PROCEDURE — 86901 BLOOD TYPING SEROLOGIC RH(D): CPT

## 2021-07-31 PROCEDURE — P9016: CPT

## 2021-07-31 PROCEDURE — 80053 COMPREHEN METABOLIC PANEL: CPT

## 2021-07-31 PROCEDURE — 85025 COMPLETE CBC W/AUTO DIFF WBC: CPT

## 2021-07-31 PROCEDURE — U0003: CPT

## 2021-07-31 PROCEDURE — 82248 BILIRUBIN DIRECT: CPT

## 2021-07-31 PROCEDURE — 96374 THER/PROPH/DIAG INJ IV PUSH: CPT

## 2021-07-31 PROCEDURE — 86850 RBC ANTIBODY SCREEN: CPT

## 2021-07-31 PROCEDURE — 86923 COMPATIBILITY TEST ELECTRIC: CPT

## 2021-07-31 PROCEDURE — 84100 ASSAY OF PHOSPHORUS: CPT

## 2021-07-31 PROCEDURE — 99261: CPT

## 2021-07-31 PROCEDURE — 82247 BILIRUBIN TOTAL: CPT

## 2021-07-31 PROCEDURE — 85045 AUTOMATED RETICULOCYTE COUNT: CPT

## 2021-07-31 PROCEDURE — 87340 HEPATITIS B SURFACE AG IA: CPT

## 2021-07-31 PROCEDURE — 99291 CRITICAL CARE FIRST HOUR: CPT | Mod: 25

## 2021-07-31 PROCEDURE — 36430 TRANSFUSION BLD/BLD COMPNT: CPT

## 2021-07-31 PROCEDURE — 80048 BASIC METABOLIC PNL TOTAL CA: CPT

## 2021-07-31 PROCEDURE — 86706 HEP B SURFACE ANTIBODY: CPT

## 2021-07-31 PROCEDURE — 85610 PROTHROMBIN TIME: CPT

## 2021-07-31 PROCEDURE — 83010 ASSAY OF HAPTOGLOBIN QUANT: CPT

## 2021-07-31 PROCEDURE — 86704 HEP B CORE ANTIBODY TOTAL: CPT

## 2021-07-31 PROCEDURE — 96375 TX/PRO/DX INJ NEW DRUG ADDON: CPT

## 2021-07-31 PROCEDURE — 86900 BLOOD TYPING SEROLOGIC ABO: CPT

## 2021-07-31 PROCEDURE — 86803 HEPATITIS C AB TEST: CPT

## 2021-07-31 PROCEDURE — 86769 SARS-COV-2 COVID-19 ANTIBODY: CPT

## 2021-07-31 PROCEDURE — U0005: CPT

## 2021-07-31 PROCEDURE — 74176 CT ABD & PELVIS W/O CONTRAST: CPT

## 2021-07-31 PROCEDURE — 83615 LACTATE (LD) (LDH) ENZYME: CPT

## 2021-07-31 PROCEDURE — 83735 ASSAY OF MAGNESIUM: CPT

## 2021-07-31 PROCEDURE — 93975 VASCULAR STUDY: CPT

## 2021-07-31 RX ORDER — PANTOPRAZOLE SODIUM 20 MG/1
1 TABLET, DELAYED RELEASE ORAL
Qty: 60 | Refills: 0
Start: 2021-07-31 | End: 2021-08-29

## 2021-07-31 RX ORDER — SUCRALFATE 1 G
10 TABLET ORAL
Qty: 900 | Refills: 0
Start: 2021-07-31 | End: 2021-08-29

## 2021-07-31 RX ADMIN — Medication 1 GRAM(S): at 06:23

## 2021-07-31 RX ADMIN — Medication 1 TABLET(S): at 11:22

## 2021-07-31 RX ADMIN — Medication 1334 MILLIGRAM(S): at 11:24

## 2021-07-31 RX ADMIN — CHLORHEXIDINE GLUCONATE 1 APPLICATION(S): 213 SOLUTION TOPICAL at 11:24

## 2021-07-31 RX ADMIN — Medication 1334 MILLIGRAM(S): at 08:44

## 2021-07-31 NOTE — DISCHARGE NOTE NURSING/CASE MANAGEMENT/SOCIAL WORK - PATIENT PORTAL LINK FT
You can access the FollowMyHealth Patient Portal offered by [facility] by registering at the following website: [email or web address]. By joining [employer]’s FollowMyHealth portal, you will also be able to view your health information using other applications (apps) compatible with our system.

## 2021-07-31 NOTE — CHART NOTE - NSCHARTNOTEFT_GEN_A_CORE
Patient's Hgb stable. From Hematology perspective no opposition to discharge as long as symptoms adequately controlled

## 2021-07-31 NOTE — DISCHARGE NOTE NURSING/CASE MANAGEMENT/SOCIAL WORK - NSDCVIVACCINE_GEN_ALL_CORE_FT
influenza, injectable, quadrivalent, preservative free; 20-Nov-2014 12:44; Aston Ulloa (RN); [employer]; [id number]; IntraMuscular; Deltoid Left.; 0.5 milliLiter(s);   influenza, injectable, quadrivalent, preservative free; 05-Oct-2016 18:15; Rosario James (RN); [employer]; [id number]; IntraMuscular; Deltoid Left.; 0.5 milliLiter(s); VIS (VIS Published: 07-Aug-2015, VIS Presented: 05-Oct-2016);

## 2021-07-31 NOTE — PROGRESS NOTE ADULT - ASSESSMENT
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     Problem/Plan - 1:  ·  Problem: Polycythemia vera.  Plan: with secondary myelofibrosis  - cont jakafi - patient needs to bring her own from home (non-formulary).      Problem/Plan - 2:  ·  Problem: Anemia.  Plan: due to myelofibrosis  monitor hgb daily  monitor for bleeding  B12/folate/ferritin were last checked 4/2021, all were wnl.     Problem/Plan - 3:  ·  Problem: ESRD (end stage renal disease).  Plan: cont MWF schedule  consult renal  cont nephro-deonna  cont phoslo.      Problem/Plan - 4:  ·  Problem: Splenic infarct.  Plan: and splenomegaly  since 2015, has been managed conservatively.      Problem/Plan - 5:  ·  Problem: Right upper quadrant abdominal pain.  Plan: CT A/P no clear pathology to explain this pain, will monitor.      Problem/Plan - 6:  Problem: Essential hypertension. Plan: cont propranolol with hold parameters.    GI eval appreciated  continue current care    Dc home today

## 2021-07-31 NOTE — PROGRESS NOTE ADULT - SUBJECTIVE AND OBJECTIVE BOX
Patient is a 61y old  Female who presents with a chief complaint of anemia (30 Jul 2021 14:13)      SUBJECTIVE / OVERNIGHT EVENTS:    Events noted.  CONSTITUTIONAL: Weakness  RESPIRATORY: No cough, wheezing,  No shortness of breath  CARDIOVASCULAR: No chest pain, palpitations, dizziness, or leg swelling  GASTROINTESTINAL: No abdominal or epigastric pain. No nausea, vomiting.  NEUROLOGICAL: No headaches,     MEDICATIONS  (STANDING):  calcium acetate 1334 milliGRAM(s) Oral three times a day with meals  chlorhexidine 2% Cloths 1 Application(s) Topical daily  epoetin filiberto-epbx (RETACRIT) Injectable 63858 Unit(s) IV Push <User Schedule>  Nephro-deonna 1 Tablet(s) Oral daily  pantoprazole   Suspension 40 milliGRAM(s) Oral daily  propranolol 10 milliGRAM(s) Oral daily  senna 2 Tablet(s) Oral at bedtime  sucralfate suspension 1 Gram(s) Oral two times a day    MEDICATIONS  (PRN):  acetaminophen   Tablet .. 650 milliGRAM(s) Oral every 6 hours PRN Temp greater or equal to 38.5C (101.3F), Mild Pain (1 - 3)  aluminum hydroxide/magnesium hydroxide/simethicone Suspension 30 milliLiter(s) Oral every 4 hours PRN Dyspepsia  melatonin 3 milliGRAM(s) Oral at bedtime PRN Insomnia  ondansetron Injectable 4 milliGRAM(s) IV Push every 8 hours PRN Nausea and/or Vomiting        CAPILLARY BLOOD GLUCOSE        I&O's Summary    30 Jul 2021 07:01  -  31 Jul 2021 07:00  --------------------------------------------------------  IN: 240 mL / OUT: 500 mL / NET: -260 mL    31 Jul 2021 07:01  -  31 Jul 2021 15:08  --------------------------------------------------------  IN: 440 mL / OUT: 0 mL / NET: 440 mL        T(C): 37 (07-31-21 @ 09:23), Max: 37.3 (07-30-21 @ 23:42)  HR: 83 (07-31-21 @ 09:23) (83 - 93)  BP: 142/84 (07-31-21 @ 09:23) (100/59 - 142/84)  RR: 18 (07-31-21 @ 09:23) (18 - 18)  SpO2: 96% (07-31-21 @ 09:23) (96% - 97%)    PHYSICAL EXAM:  GENERAL: NAD  NECK: Supple, No JVD  CHEST/LUNG: Clear to auscultation bilaterally; No wheezing.  HEART: Regular rate and rhythm; No murmurs, rubs, or gallops  ABDOMEN: Soft, Nontender, Nondistended; Bowel sounds present  EXTREMITIES:   No edema  NEUROLOGY: AAO X 3      LABS:                        7.9    7.62  )-----------( 142      ( 31 Jul 2021 11:28 )             25.7     07-31    135  |  98  |  30<H>  ----------------------------<  98  4.8   |  24  |  4.40<H>    Ca    9.2      31 Jul 2021 07:11    TPro  7.5  /  Alb  4.2  /  TBili  2.2<H>  /  DBili  x   /  AST  12  /  ALT  5<L>  /  AlkPhos  142<H>  07-31            CAPILLARY BLOOD GLUCOSE            RADIOLOGY & ADDITIONAL TESTS:    Imaging Personally Reviewed:    Consultant(s) Notes Reviewed:      Care Discussed with Consultants/Other Providers:    Garrett Geiger MD, CMD, FACP    [id number] [id number]  Office Tel: [phone]  Cell: [phone]

## 2021-08-10 ENCOUNTER — INPATIENT (INPATIENT)
Facility: HOSPITAL | Age: 61
LOS: 2 days | Discharge: HOME | DRG: 682 | End: 2021-08-13
Attending: INTERNAL MEDICINE | Admitting: INTERNAL MEDICINE
Payer: MEDICAID

## 2021-08-10 ENCOUNTER — NON-APPOINTMENT (OUTPATIENT)
Age: 61
End: 2021-08-10

## 2021-08-10 VITALS
RESPIRATION RATE: 20 BRPM | HEART RATE: 90 BPM | OXYGEN SATURATION: 100 % | SYSTOLIC BLOOD PRESSURE: 116 MMHG | WEIGHT: 114.64 LBS | HEIGHT: 62 IN | DIASTOLIC BLOOD PRESSURE: 69 MMHG | TEMPERATURE: 100 F

## 2021-08-10 DIAGNOSIS — Z29.9 ENCOUNTER FOR PROPHYLACTIC MEASURES, UNSPECIFIED: ICD-10-CM

## 2021-08-10 DIAGNOSIS — Z92.29 PERSONAL HISTORY OF OTHER DRUG THERAPY: ICD-10-CM

## 2021-08-10 DIAGNOSIS — Z99.2 DEPENDENCE ON RENAL DIALYSIS: Chronic | ICD-10-CM

## 2021-08-10 DIAGNOSIS — T85.898A OTHER SPECIFIED COMPLICATION OF OTHER INTERNAL PROSTHETIC DEVICES, IMPLANTS AND GRAFTS, INITIAL ENCOUNTER: Chronic | ICD-10-CM

## 2021-08-10 DIAGNOSIS — D64.9 ANEMIA, UNSPECIFIED: ICD-10-CM

## 2021-08-10 DIAGNOSIS — E87.5 HYPERKALEMIA: ICD-10-CM

## 2021-08-10 DIAGNOSIS — D75.1 SECONDARY POLYCYTHEMIA: ICD-10-CM

## 2021-08-10 DIAGNOSIS — N18.6 END STAGE RENAL DISEASE: ICD-10-CM

## 2021-08-10 DIAGNOSIS — K76.6 PORTAL HYPERTENSION: ICD-10-CM

## 2021-08-10 DIAGNOSIS — N18.9 CHRONIC KIDNEY DISEASE, UNSPECIFIED: ICD-10-CM

## 2021-08-10 DIAGNOSIS — I77.0 ARTERIOVENOUS FISTULA, ACQUIRED: Chronic | ICD-10-CM

## 2021-08-10 LAB
ALBUMIN SERPL ELPH-MCNC: 4.3 G/DL — SIGNIFICANT CHANGE UP (ref 3.3–5)
ALP SERPL-CCNC: 133 U/L — HIGH (ref 40–120)
ALT FLD-CCNC: 6 U/L — LOW (ref 10–45)
ANION GAP SERPL CALC-SCNC: 15 MMOL/L — SIGNIFICANT CHANGE UP (ref 5–17)
AST SERPL-CCNC: 13 U/L — SIGNIFICANT CHANGE UP (ref 10–40)
BASE EXCESS BLDV CALC-SCNC: -5.4 MMOL/L — LOW (ref -2–2)
BASOPHILS # BLD AUTO: 0.1 K/UL — SIGNIFICANT CHANGE UP (ref 0–0.2)
BASOPHILS NFR BLD AUTO: 1.8 % — SIGNIFICANT CHANGE UP (ref 0–2)
BILIRUB SERPL-MCNC: 3 MG/DL — HIGH (ref 0.2–1.2)
BLD GP AB SCN SERPL QL: NEGATIVE — SIGNIFICANT CHANGE UP
BUN SERPL-MCNC: 48 MG/DL — HIGH (ref 7–23)
CA-I SERPL-SCNC: 1.15 MMOL/L — SIGNIFICANT CHANGE UP (ref 1.12–1.3)
CALCIUM SERPL-MCNC: 8.6 MG/DL — SIGNIFICANT CHANGE UP (ref 8.4–10.5)
CHLORIDE BLDV-SCNC: 106 MMOL/L — SIGNIFICANT CHANGE UP (ref 96–108)
CHLORIDE SERPL-SCNC: 100 MMOL/L — SIGNIFICANT CHANGE UP (ref 96–108)
CO2 BLDV-SCNC: 21 MMOL/L — LOW (ref 22–30)
CO2 SERPL-SCNC: 16 MMOL/L — LOW (ref 22–31)
CREAT SERPL-MCNC: 6.98 MG/DL — HIGH (ref 0.5–1.3)
EOSINOPHIL # BLD AUTO: 0.2 K/UL — SIGNIFICANT CHANGE UP (ref 0–0.5)
EOSINOPHIL NFR BLD AUTO: 3.6 % — SIGNIFICANT CHANGE UP (ref 0–6)
GAS PNL BLDV: 132 MMOL/L — LOW (ref 135–145)
GAS PNL BLDV: SIGNIFICANT CHANGE UP
GLUCOSE BLDV-MCNC: 98 MG/DL — SIGNIFICANT CHANGE UP (ref 70–99)
GLUCOSE SERPL-MCNC: 112 MG/DL — HIGH (ref 70–99)
HCO3 BLDV-SCNC: 20 MMOL/L — LOW (ref 21–29)
HCT VFR BLD CALC: 19.2 % — CRITICAL LOW (ref 34.5–45)
HCT VFR BLDA CALC: 18 % — CRITICAL LOW (ref 39–50)
HGB BLD CALC-MCNC: 5.6 G/DL — CRITICAL LOW (ref 11.5–15.5)
HGB BLD-MCNC: 5.8 G/DL — CRITICAL LOW (ref 11.5–15.5)
LACTATE BLDV-MCNC: 0.8 MMOL/L — SIGNIFICANT CHANGE UP (ref 0.7–2)
LYMPHOCYTES # BLD AUTO: 0.56 K/UL — LOW (ref 1–3.3)
LYMPHOCYTES # BLD AUTO: 9.9 % — LOW (ref 13–44)
MAGNESIUM SERPL-MCNC: 2.2 MG/DL — SIGNIFICANT CHANGE UP (ref 1.6–2.6)
MCHC RBC-ENTMCNC: 30.2 GM/DL — LOW (ref 32–36)
MCHC RBC-ENTMCNC: 30.2 PG — SIGNIFICANT CHANGE UP (ref 27–34)
MCV RBC AUTO: 100 FL — SIGNIFICANT CHANGE UP (ref 80–100)
MONOCYTES # BLD AUTO: 0.1 K/UL — SIGNIFICANT CHANGE UP (ref 0–0.9)
MONOCYTES NFR BLD AUTO: 1.8 % — LOW (ref 2–14)
NEUTROPHILS # BLD AUTO: 4.61 K/UL — SIGNIFICANT CHANGE UP (ref 1.8–7.4)
NEUTROPHILS NFR BLD AUTO: 81.1 % — HIGH (ref 43–77)
OTHER CELLS CSF MANUAL: 2 ML/DL — LOW (ref 18–22)
PCO2 BLDV: 38 MMHG — SIGNIFICANT CHANGE UP (ref 35–50)
PH BLDV: 7.33 — LOW (ref 7.35–7.45)
PHOSPHATE SERPL-MCNC: 3.2 MG/DL — SIGNIFICANT CHANGE UP (ref 2.5–4.5)
PLATELET # BLD AUTO: 123 K/UL — LOW (ref 150–400)
PO2 BLDV: 21 MMHG — LOW (ref 25–45)
POTASSIUM BLDV-SCNC: 5.5 MMOL/L — HIGH (ref 3.5–5.3)
POTASSIUM SERPL-MCNC: 5.5 MMOL/L — HIGH (ref 3.5–5.3)
POTASSIUM SERPL-SCNC: 5.5 MMOL/L — HIGH (ref 3.5–5.3)
PROT SERPL-MCNC: 7.9 G/DL — SIGNIFICANT CHANGE UP (ref 6–8.3)
RAPID RVP RESULT: SIGNIFICANT CHANGE UP
RBC # BLD: 1.92 M/UL — LOW (ref 3.8–5.2)
RBC # FLD: 20.2 % — HIGH (ref 10.3–14.5)
RH IG SCN BLD-IMP: POSITIVE — SIGNIFICANT CHANGE UP
SAO2 % BLDV: 31 % — LOW (ref 67–88)
SARS-COV-2 RNA SPEC QL NAA+PROBE: SIGNIFICANT CHANGE UP
SODIUM SERPL-SCNC: 131 MMOL/L — LOW (ref 135–145)
WBC # BLD: 5.68 K/UL — SIGNIFICANT CHANGE UP (ref 3.8–10.5)
WBC # FLD AUTO: 5.68 K/UL — SIGNIFICANT CHANGE UP (ref 3.8–10.5)

## 2021-08-10 PROCEDURE — 99223 1ST HOSP IP/OBS HIGH 75: CPT | Mod: GC

## 2021-08-10 PROCEDURE — 71045 X-RAY EXAM CHEST 1 VIEW: CPT | Mod: 26

## 2021-08-10 PROCEDURE — 99254 IP/OBS CNSLTJ NEW/EST MOD 60: CPT | Mod: GC

## 2021-08-10 PROCEDURE — 99291 CRITICAL CARE FIRST HOUR: CPT

## 2021-08-10 PROCEDURE — 99223 1ST HOSP IP/OBS HIGH 75: CPT

## 2021-08-10 RX ORDER — PANTOPRAZOLE SODIUM 20 MG/1
40 TABLET, DELAYED RELEASE ORAL
Refills: 0 | Status: DISCONTINUED | OUTPATIENT
Start: 2021-08-10 | End: 2021-08-13

## 2021-08-10 RX ORDER — CALCIUM ACETATE 667 MG
1334 TABLET ORAL
Refills: 0 | Status: DISCONTINUED | OUTPATIENT
Start: 2021-08-10 | End: 2021-08-13

## 2021-08-10 RX ORDER — ERYTHROPOIETIN 10000 [IU]/ML
15000 INJECTION, SOLUTION INTRAVENOUS; SUBCUTANEOUS ONCE
Refills: 0 | Status: COMPLETED | OUTPATIENT
Start: 2021-08-10 | End: 2021-08-10

## 2021-08-10 RX ORDER — ACETAMINOPHEN 500 MG
650 TABLET ORAL EVERY 6 HOURS
Refills: 0 | Status: DISCONTINUED | OUTPATIENT
Start: 2021-08-10 | End: 2021-08-13

## 2021-08-10 RX ORDER — SUCRALFATE 1 G
1 TABLET ORAL
Refills: 0 | Status: DISCONTINUED | OUTPATIENT
Start: 2021-08-10 | End: 2021-08-13

## 2021-08-10 RX ORDER — ONDANSETRON 8 MG/1
4 TABLET, FILM COATED ORAL EVERY 8 HOURS
Refills: 0 | Status: DISCONTINUED | OUTPATIENT
Start: 2021-08-10 | End: 2021-08-13

## 2021-08-10 RX ORDER — LANOLIN ALCOHOL/MO/W.PET/CERES
3 CREAM (GRAM) TOPICAL AT BEDTIME
Refills: 0 | Status: DISCONTINUED | OUTPATIENT
Start: 2021-08-10 | End: 2021-08-13

## 2021-08-10 RX ORDER — GABAPENTIN 400 MG/1
100 CAPSULE ORAL DAILY
Refills: 0 | Status: DISCONTINUED | OUTPATIENT
Start: 2021-08-10 | End: 2021-08-12

## 2021-08-10 RX ADMIN — ERYTHROPOIETIN 15000 UNIT(S): 10000 INJECTION, SOLUTION INTRAVENOUS; SUBCUTANEOUS at 17:31

## 2021-08-10 RX ADMIN — Medication 1 GRAM(S): at 22:49

## 2021-08-10 RX ADMIN — Medication 1334 MILLIGRAM(S): at 22:49

## 2021-08-10 RX ADMIN — Medication 650 MILLIGRAM(S): at 22:45

## 2021-08-10 RX ADMIN — PANTOPRAZOLE SODIUM 40 MILLIGRAM(S): 20 TABLET, DELAYED RELEASE ORAL at 22:49

## 2021-08-10 NOTE — H&P ADULT - HISTORY OF PRESENT ILLNESS
61F with PMH of noncirrhotic portal HTN c/b gastric varices, COVID infection 4/2021, ESRD on HD MWF (chronic GN, last dialyzed Friday), MAK 2 + polycythemia vera progressed to secondary myelofibrosis dx 2012, c/b splenomegaly and history of splenic vein thrombosis 2015, s/p 2nd dose COVID vaccine Pfizer 2 days ago who was sent to ER by nephrologist Dr. De León for Hgb 5.8. Pt reports fevers, chills, headaches, nausea, retching and diarrhea X 2 days after recent 2nd dose of covid vaccine, but denies syncope, SOB, SANTAMARIA, melena, hematochezia or hematuria. She also reports 10-15 lbs unintentional weight loss X 6 months and early satiety. She was due to HD yesterday but couldnt go because she was febrile and lightheaded. Last EGD in january revealed portal gastropathy, and last colonoscopy was several years ago with no acute pathology. She was recently admitted last month and was planned for inpatient EGD per GI however pt was discharged instead.  In the ER she is hemodynamically stable and afebrile. Labs with pancytopenia with Hgb 5.8, K 5.5, sCr 6.98. CXR with possible RLL atelectasis vs PNA, but pt denies cough or SOB. Nephrology consulted for HD. She was ordered for two 1/2 units of blood, admitted to medicine for further management.

## 2021-08-10 NOTE — CONSULT NOTE ADULT - ASSESSMENT
60 yo Slovenian-speaking F with a PMH JAK2+ PV (dx 2012) with transformation to secondary MF (on 6/15/21 BMB) c/b splenomegaly/splenic infarct and splenic vein thrombosis with non-cirrhotic portal hypertension/gastric varices, ESRD on HD (MWF, due to chronic GN), and COVID 04/2021 who p/w acute on chronic     #Acute on chronic anemia  - Hb 5.8, baseline 8  - Recently switched from Hydroxyurea to Jakafi, which can lower blood counts (Patient also has had worsening thrombocytopenia during this time)  - Patient denies aspirin or NSAID use; no gross bleeding per patient  - May be multifactorial including medication vs bleed vs worsening of disease  - Check fecal occult, f/u GI recs (would recommend endoscopic evaluation if stable)  - Receiving two 1/2 units of PRBCs in ED  - Check iron studies (ferritin, TIBC, iron), reticulocyte count, B12/folate, and hemolysis labs (LDH, haptoglobin)  - Ordered peripheral smear, will f/u  - Hold off A/C at this time    #PV with transformation to secondary myelofibrosis  - With a PMH splenic vein thrombosis/splenic infarct, splenomegaly, leading to non-cirrhotic portal hypertension and gastric varices  - Previously diagnosed with PV, then on 6/15/21, had BM which showed JAK2 positive myeloproliferative neoplasm with secondary myelofibrosis; post-polycythemia vera myelofibrosis  - She was found to have a normal FISH, but her marrow showed a reticulin stain showing moderate to marked increase and trichrome stain showing increased collagen in the foci of marked reticulin (grade 2-3 fibrosis), which does increase the risk for AML transformation  - Anemia workup as above  - Hold Jakafi for now in the setting of acute anemia. However, upon restart, patient should be on 10 mg three times a week instead of her current 15 mg    Aryles Hedjar, MD, PGY-4  Hematology/Oncology Fellow  [facility]  Pager: [phone]  After 5PM and on weekends and holidays, please call the inpatient fellow on call.

## 2021-08-10 NOTE — ED PROVIDER NOTE - CARE PLAN
1 Principal Discharge DX:	Anemia secondary to renal failure  Secondary Diagnosis:	Hyperkalemia  Secondary Diagnosis:	ESRD on dialysis

## 2021-08-10 NOTE — ED ADULT NURSE REASSESSMENT NOTE - NS ED NURSE REASSESS COMMENT FT1
Signed blood transfusion consent in patient chart. Patient educated and verbalized understanding of transfusion reaction. Blood verified and administered with 2RNs at bedside.

## 2021-08-10 NOTE — ED PROVIDER NOTE - OBJECTIVE STATEMENT
62 yo female PMHx COVID 4/2021, ESRD on HD MWF (chronic GN, last dialyzed Friday), HTN, MAK 2 + polycythemia vera dx 2012, complicated by splenomegaly and history of splenic vein thrombosis 2015 s/p 2nd dose COVID vaccine Pfizer 2 days ago now directed to the ED by nephro Dr. De León for low H/H. PAtient reported low grade fevers, dizziness, and diarrhea the night of the receiving COVID vaccine. Patient unable to get dialysis yesterday as she had low grade fever and myalgia. Patient still makes urine. Patient denied CP, SOB, abdominal pain, N/V/D,

## 2021-08-10 NOTE — ED PROCEDURE NOTE - PROCEDURE ADDITIONAL DETAILS
Ultrasound-guided cannulation of a peripheral vein in the upper extremity rue 18g piv     Dynamic gray scale imaging of the target vessel was obtained using a high frequency linear transducer.   Both the target vein and surrounding arterial structures were visualized and identified.   The patency of the targeted vein was confirmed with compression and lack of internal echoes.   There was direct visualization of needle entry into the vein followed by successful catheter placement    ~Jagdish Harris D.O. -Resident

## 2021-08-10 NOTE — H&P ADULT - NSHPREVIEWOFSYSTEMS_GEN_ALL_CORE
CONSTITUTIONAL: +weakness, +fevers, +chills; +10-15lb weight loss in 6 months  EYES: No visual changes; No blurry vision  ENT: No vertigo or throat pain   NECK: No pain or stiffness  RESPIRATORY: No cough, wheezing, hemoptysis; No shortness of breath  CARDIOVASCULAR: No chest pain or palpitations, no SANTAMARIA, no orthopnea or PND  GASTROINTESTINAL: No abdominal or epigastric pain. +nausea, no vomiting, or hematemesis; +diarrhea. No melena or hematochezia. +decreased appetite, +early satiety  GENITOURINARY: No dysuria, frequency or hematuria, still makes urine  NEUROLOGICAL: No numbness or weakness, no syncope  SKIN: No itching, rashes  PSYCH: No insomnia, no depression  IMMUNOLOGY: No gum bleeding, no lymphadenopathy  ENDOCRINE: No polydipsia, no heat or cold intolerance  RHEUM: No joint pain or swelling, no rash

## 2021-08-10 NOTE — ED PROVIDER NOTE - PROGRESS NOTE DETAILS
RONEL Lobato: paged nephro awaiting call back RONEL Lobato: made nephro fellow Dr. Jackson aware of H/H and K 5.5. will arrange for dialysis and hold meds to treat hyperkalemia. will admit to tele

## 2021-08-10 NOTE — H&P ADULT - PROBLEM SELECTOR PLAN 1
Likely anemia of chronic disease in setting of renal disease c/b known secondary myelofibrosis  Planned for two 1/2 units pRBC in ED  F/u post transfusion CBC  GI consult given hx of gastric varices  Obtain stool occult, retic, iron panel, B12, folate  Trend CBC Q12  EPO with HD

## 2021-08-10 NOTE — H&P ADULT - PROBLEM SELECTOR PLAN 6
c/b secondary myelofibrosis as seen on recent BM biopsy 6/21  Heme/onc consult placed  Cont home horacio (will need to bring in, non formulary)

## 2021-08-10 NOTE — CONSULT NOTE ADULT - ASSESSMENT
61F with PMH of noncirrhotic portal HTN c/b gastric varices, COVID infection 4/2021, ESRD on HD MWF (chronic GN, last dialyzed Friday), MAK 2 + polycythemia vera progressed to secondary myelofibrosis dx 2012, c/b splenomegaly and history of splenic vein thrombosis 2015, s/p 2nd dose COVID vaccine Pfizer 2 days ago who was sent to ER by nephrologist Dr. De León for Hgb 5.8.     #Anemia  #Non-cirrhotic portal hypertension  #Gastric varices  #Weight loss    Differential diagnosis for anemia includes hemolytic (in setting of elevated indirect bili, elevated LDH, and borderline haptoglobin) vs Bone marrow depression from myelofibrosis vs GI loss.   In setting of weight loss, early satiety, and now anemia if a 62 yo patient there is multiple indications for considering endoscopic evaluation.   Further work up for anemia first warranted.    Recommendations  - 61F with PMH of noncirrhotic portal HTN c/b gastric varices, COVID infection 4/2021, ESRD on HD MWF (chronic GN, last dialyzed Friday), MAK 2 + polycythemia vera progressed to secondary myelofibrosis dx 2012, c/b splenomegaly and history of splenic vein thrombosis 2015, s/p 2nd dose COVID vaccine Pfizer 2 days ago who was sent to ER by nephrologist Dr. De León for Hgb 5.8.     #Anemia  #Non-cirrhotic portal hypertension  #Gastric varices    Differential diagnosis for anemia includes hemolytic (in setting of elevated indirect bili, elevated LDH, and borderline haptoglobin) vs Bone marrow depression from myelofibrosis vs GI loss.   In setting of concern for worsening of myelofibrosis. Given no sign of over GI bleeding with current drop, other etiology mentioned above should be explored first before considering endoscopic evaluation     Recommendations  -Anemia work up with hemolysis labs (Haptoglobin, LDH, and Total/indirect bilirubin), reticulocyte count  -Pending further evaluation from Hem/Onc given progression of myelofibrosis  -If above work up negative and other etiology for anemia ruled out or patient shows sign of GI bleeding, will consider EGD at that time.  -Would continue home PPI      Lew Larios MD  Gastroenterology/Hepatology Fellow  1st option: [phone] (text or call), ONLY available from 7:00 am to 5:00 pm.   **Contact on-call GI fellow via answering service ([phone]) from 5pm-7am AND on weekends/holidays**  2nd option: Available via [employer] Teams  3rd option: Pager: [phone]    NON-URGENT CONSULTS:  Please email [email or web address]  AT NIGHT AND ON WEEKENDS:  Contact on-call GI fellow via answering service ([phone]) from 5pm-8am AND on weekends/holidays

## 2021-08-10 NOTE — CONSULT NOTE ADULT - SUBJECTIVE AND OBJECTIVE BOX
Chief Complaint:      HPI:  61F with PMH of noncirrhotic portal HTN c/b gastric varices, COVID infection 4/2021, ESRD on HD MWF (chronic GN, last dialyzed Friday), MAK 2 + polycythemia vera progressed to secondary myelofibrosis dx 2012, c/b splenomegaly and history of splenic vein thrombosis 2015, s/p 2nd dose COVID vaccine Pfizer 2 days ago who was sent to ER by nephrologist Dr. De León for Hgb 5.8. Pt reports fevers, chills, headaches, nausea, retching and diarrhea X 2 days after recent 2nd dose of covid vaccine, but denies syncope, SOB, SANTAMARIA, melena, hematochezia or hematuria. She also reports 10-15 lbs unintentional weight loss X 6 months and early satiety. She was due to HD yesterday but couldnt go because she was febrile and lightheaded. Last EGD in january revealed portal gastropathy, and last colonoscopy was several years ago with no acute pathology. She was recently admitted last month and was planned for inpatient EGD per GI however pt was discharged instead.  In the ER she is hemodynamically stable and afebrile. Labs with pancytopenia with Hgb 5.8, K 5.5, sCr 6.98. CXR with possible RLL atelectasis vs PNA, but pt denies cough or SOB. Nephrology consulted for HD. She was ordered for two 1/2 units of blood, admitted to medicine for further management.       Allergies:  No Known Allergies    Home Medications:  calcium acetate 667 mg oral capsule: 2 tab(s) orally 3 times a day (10 Aug 2021 14:10)  epoetin filiberto: 28586 unit(s) intravenous Monday, Wednesday, and Friday at HD (10 Aug 2021 14:10)  gabapentin 100 mg oral capsule: 1 cap(s) orally 2 times a day (10 Aug 2021 14:10)  Jakafi 15 mg oral tablet: 1 tab(s) orally 3 times a week after HD (10 Aug 2021 14:10)  Nephro-Ben oral tablet: 1 tab(s) orally once a day (10 Aug 2021 14:10)      Hospital Medications:  acetaminophen   Tablet .. 650 milliGRAM(s) Oral every 6 hours PRN  aluminum hydroxide/magnesium hydroxide/simethicone Suspension 30 milliLiter(s) Oral every 4 hours PRN  calcium acetate 1334 milliGRAM(s) Oral three times a day with meals  epoetin filiberto-epbx (RETACRIT) Injectable 60825 Unit(s) IV Push once  gabapentin 100 milliGRAM(s) Oral two times a day  melatonin 3 milliGRAM(s) Oral at bedtime PRN  multivitamin 1 Tablet(s) Oral daily  ondansetron Injectable 4 milliGRAM(s) IV Push every 8 hours PRN  pantoprazole    Tablet 40 milliGRAM(s) Oral two times a day  propranolol 10 milliGRAM(s) Oral daily  sucralfate suspension 1 Gram(s) Oral two times a day      PMHX/PSHX:  Polycythemia vera    Peptic ulcer disease    Hypertension    Splenomegaly    Splenic infarct    ESRD on peritoneal dialysis    Cirrhosis of liver without ascites, unspecified hepatic cirrhosis type    Pancreatic cyst    No significant past surgical history    Peritoneal dialysis catheter in place    Hemoperitoneum as complication of peritoneal dialysis    AV fistula        Family history:  No pertinent family history in first degree relatives    Family history of hypertension in mother    Family history of malignant neoplasm (Grandparent)    FH: cirrhosis (Sibling)        Social History: no smoking    ROS:   General:  No fevers, chills or night sweats  ENT:  No sore throat or dysphagia  CV:  No pain or palpitations  Resp:  No dyspnea, cough or  wheezing  GI:  as above  Skin:  No rash or edema  Neuro: no weakness   Hematologic: no bleeding  Musculoskeletal: no muscle pain or join pain  Psych: no agitation     : no dysuria      PHYSICAL EXAM:   GENERAL:  NAD, Appears stated age  HEENT:  NC/AT,  conjunctivae clear and pink, sclera -anicteric  CHEST:  CTA B/L, Normal effort  HEART:  RRR S1/S2,  ABDOMEN:  Soft, non-tender, non-distended, splenomegaly  EXTREMITIES:  No cyanosis or Edema  SKIN:  Warm & Dry. No rash or erythema  NEURO:  Alert, oriented, no focal deficit    Vital Signs:  Vital Signs Last 24 Hrs  T(C): 36.8 (10 Aug 2021 14:56), Max: 37.7 (10 Aug 2021 09:08)  T(F): 98.2 (10 Aug 2021 14:56), Max: 99.8 (10 Aug 2021 09:08)  HR: 77 (10 Aug 2021 14:56) (77 - 90)  BP: 129/76 (10 Aug 2021 14:56) (116/69 - 137/69)  BP(mean): 88 (10 Aug 2021 09:08) (88 - 88)  RR: 18 (10 Aug 2021 14:56) (15 - 20)  SpO2: 100% (10 Aug 2021 14:56) (99% - 100%)  Daily Height in cm: 157.48 (10 Aug 2021 08:29)    Daily     LABS:                        5.8    5.68  )-----------( 123      ( 10 Aug 2021 09:30 )             19.2     Mean Cell Volume: 100.0 fl (08-10-21 @ 09:30)    08-10    131<L>  |  100  |  48<H>  ----------------------------<  112<H>  5.5<H>   |  16<L>  |  6.98<H>    Ca    8.6      10 Aug 2021 09:30  Phos  3.2     08-10  Mg     2.2     08-10    TPro  7.9  /  Alb  4.3  /  TBili  3.0<H>  /  DBili  x   /  AST  13  /  ALT  6<L>  /  AlkPhos  133<H>  08-10    LIVER FUNCTIONS - ( 10 Aug 2021 09:30 )  Alb: 4.3 g/dL / Pro: 7.9 g/dL / ALK PHOS: 133 U/L / ALT: 6 U/L / AST: 13 U/L / GGT: x                                       5.8    5.68  )-----------( 123      ( 10 Aug 2021 09:30 )             19.2     Imaging:  < from: Xray Chest 1 View- PORTABLE-Urgent (Xray Chest 1 View- PORTABLE-Urgent .) (08.10.21 @ 09:05) >    IMPRESSION: Low lung volumes.    New patchy right lowerlung opacities which can be secondary to subsegmental atelectasis or pneumonia.    < end of copied text >  
[place]ian , ID #[id number]    60 yo [place]arusian-speaking F with a PMH JAK2+ PV (dx 2012) with transformation to secondary MF (on 6/15/21 BMB) c/b splenomegaly/splenic infarct and splenic vein thrombosis with non-cirrhotic portal hypertension/gastric varices, ESRD on HD (MWF, due to chronic GN), and COVID 04/2021 who p/w acute on chronic anemia. She was sent to the ED by her nephrologist, Dr. De León for a Hgb of 5.8. Pt reports fevers, chills, headaches, nausea, retching and diarrhea X 2 days after recent 2nd dose of her COVID vaccine, but denies syncope, SOB, SANTAMARIA, melena, hematochezia or hematuria. She also reports 10-15 lbs unintentional weight loss X 6 months and early satiety. She was due to HD yesterday but couldn't go because she was febrile and lightheaded. Last EGD in January revealed portal gastropathy, and last colonoscopy was several years ago with no acute pathology. She was recently admitted last month and was planned for inpatient EGD per GI, but the pt was discharged instead.    Of note, she was switched from Hydrea to Jakafi (15 mg MWF with dialysis) after a bone marrow biopsy on 6/15/21 showed transformation of PV to myeloproliferative disorder. She was found to have a normal FISH, but her marrow showed a reticulin stain showing moderate to marked increase and trichrome stain showing increased collagen in the foci of marked reticulin (grade 2-3 fibrosis). She had also been offered a transplant referral at that time but declined.  Patient states she was not on aspirin for her PV/MF. She denies using NSAIDs, states she only uses Tylenol for pain.      Allergies    No Known Allergies    Intolerances        MEDICATIONS  (STANDING):  calcium acetate 1334 milliGRAM(s) Oral three times a day with meals  epoetin filiberto-epbx (RETACRIT) Injectable 85707 Unit(s) IV Push once  gabapentin 100 milliGRAM(s) Oral two times a day  multivitamin 1 Tablet(s) Oral daily  pantoprazole    Tablet 40 milliGRAM(s) Oral two times a day  propranolol 10 milliGRAM(s) Oral daily  sucralfate suspension 1 Gram(s) Oral two times a day    MEDICATIONS  (PRN):  acetaminophen   Tablet .. 650 milliGRAM(s) Oral every 6 hours PRN Temp greater or equal to 38.5C (101.3F), Mild Pain (1 - 3)  aluminum hydroxide/magnesium hydroxide/simethicone Suspension 30 milliLiter(s) Oral every 4 hours PRN Dyspepsia  melatonin 3 milliGRAM(s) Oral at bedtime PRN Insomnia  ondansetron Injectable 4 milliGRAM(s) IV Push every 8 hours PRN Nausea and/or Vomiting      PAST MEDICAL & SURGICAL HISTORY:  Polycythemia vera  and secondary myelofibrosis    Peptic ulcer disease    Hypertension    Splenomegaly  2015    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    Cirrhosis of liver without ascites, unspecified hepatic cirrhosis type    Pancreatic cyst    Peritoneal dialysis catheter in place  Placed 8/9/2017    Hemoperitoneum as complication of peritoneal dialysis  s/p removal 9/18    AV fistula  Placed 9/18        FAMILY HISTORY:  Family history of hypertension in mother    Family history of malignant neoplasm (Grandparent)  head and neck in father    FH: cirrhosis (Sibling)        SOCIAL HISTORY: No EtOH, no tobacco    REVIEW OF SYSTEMS:    CONSTITUTIONAL: + fever, chills  EYES/ENT: No visual changes;  no throat pain   NECK: No pain or stiffness  RESPIRATORY: no sob  CARDIOVASCULAR: No chest pain or palpitations  GASTROINTESTINAL: + N/V/D. No abdominal pain. No constipation.  GENITOURINARY: No dysuria, change in frequency or hematuria  NEUROLOGICAL: + weakness, headache. No numbness  SKIN: No itching, burning, rashes, or lesions   Psych: No depression   MSK: no joint pain  Allergy: no urticaria     Height (cm): 157.5 (08-10 @ 08:29)  Weight (kg): 52 (08-10 @ 08:29)  BMI (kg/m2): 21 (08-10 @ 08:29)  BSA (m2): 1.51 (08-10 @ 08:29)    T(F): 98.2 (08-10-21 @ 14:56), Max: 99.8 (08-10-21 @ 09:08)  HR: 77 (08-10-21 @ 14:56)  BP: 129/76 (08-10-21 @ 14:56)  RR: 18 (08-10-21 @ 14:56)  SpO2: 100% (08-10-21 @ 14:56)  Wt(kg): --    GENERAL: NAD  HEENT: EOMI, MM dry, no oropharyngeal lesions or erythema appreciated  Pulm: CTAB/L, no increased WOB  CV: RRR  ABDOMEN: soft, NT, ND., Spleen 3 cm below costophrenic angle  MSK: nl ROM  EXTREMITIES:  no appreciable edema in b/l LE  Neuro: A&Ox3, no focal deficits  SKIN: warm and dry, no visible rash                          5.8    5.68  )-----------( 123      ( 10 Aug 2021 09:30 )             19.2       08-10    131<L>  |  100  |  48<H>  ----------------------------<  112<H>  5.5<H>   |  16<L>  |  6.98<H>    Ca    8.6      10 Aug 2021 09:30  Phos  3.2     08-10  Mg     2.2     08-10    TPro  7.9  /  Alb  4.3  /  TBili  3.0<H>  /  DBili  x   /  AST  13  /  ALT  6<L>  /  AlkPhos  133<H>  08-10      Magnesium, Serum: 2.2 mg/dL (08-10 @ 09:30)  Phosphorus Level, Serum: 3.2 mg/dL (08-10 @ 09:30)      
[facility] DIVISION OF KIDNEY DISEASES AND HYPERTENSION -- [phone]  -- INITIAL CONSULT NOTE  --------------------------------------------------------------------------------  HPI:  60 yo female PMHx COVID 4/2021, ESRD on HD MWF (chronic GN), HTN, MAK 2 + polycythemia vera dx 2012, complicated by splenomegaly and history of splenic vein thrombosis 2015  now progressing to secondary myelofibrosis, is now s/p 2nd dose COVID vaccine Pfizer 2 days ago now directed to the ED by nephro Dr. De León for low H/H. PAtient reported low grade fevers, dizziness, and diarrhea the night of the receiving COVID vaccine. Patient unable to get dialysis yesterday as she had low grade fever and myalgia. Patient still makes urine. Patient denied CP, SOB, abdominal pain, N/V/D  Nephrology consulted for ESKD management. Pt gets HD MWF (at [facility] dialysis unit Follows with Dr. De León). She reports last HD was on Friday 8/6/21 via L AVF.         Patient seen & examined. Complains of dizziness, fever, diarrhea, nausea, chills yesterday but resolved now. Denies chest pain, dysuria, hematuria, pus in urine, frothy urine, SOB, leg edema.       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    Cirrhosis of liver without ascites, unspecified hepatic cirrhosis type    Pancreatic cyst    Peritoneal dialysis catheter in place  Placed 8/9/2017    Hemoperitoneum as complication of peritoneal dialysis  s/p removal 9/18    AV fistula  Placed 9/18      FAMILY HISTORY:  Family history of hypertension in mother    Family history of malignant neoplasm (Grandparent)  head and neck in father    FH: cirrhosis (Sibling)      PAST SOCIAL HISTORY:    ALLERGIES & MEDICATIONS  --------------------------------------------------------------------------------  Allergies    No Known Allergies    Intolerances      Standing Inpatient Medications    PRN Inpatient Medications      REVIEW OF SYSTEMS  --------------------------------------------------------------------------------  Gen: No  fevers/chills  Respiratory: No dyspnea, cough  CV: No chest pain  GI: No abdominal pain, diarrhea,  nausea, vomiting  : No increased frequency, dysuria, hematuria  MSK:  no edema  Neuro: No dizziness/lightheadedness    All other systems were reviewed and are negative, except as noted.    VITALS/PHYSICAL EXAM  --------------------------------------------------------------------------------  T(C): 37.2 (08-10-21 @ 13:35), Max: 37.7 (08-10-21 @ 09:08)  HR: 82 (08-10-21 @ 13:35) (78 - 90)  BP: 127/79 (08-10-21 @ 13:35) (116/69 - 137/69)  RR: 15 (08-10-21 @ 13:35) (15 - 20)  SpO2: 100% (08-10-21 @ 13:35) (99% - 100%)  Wt(kg): --  Height (cm): 157.5 (08-10-21 @ 08:29)  Weight (kg): 52 (08-10-21 @ 08:29)  BMI (kg/m2): 21 (08-10-21 @ 08:29)  BSA (m2): 1.51 (08-10-21 @ 08:29)      Physical Exam:  	Gen: NAD  	HEENT: MMM  	Pulm: CTA B/L  	CV: S1S2  	Abd: Soft, +BS   	Ext: No LE edema B/L, no asterixis  	Neuro: Awake, alert  	Skin: Warm and dry  	Vascular access: L AVF with thrill    LABS/STUDIES  --------------------------------------------------------------------------------              5.8    5.68  >-----------<  123      [08-10-21 @ 09:30]              19.2     131  |  100  |  48  ----------------------------<  112      [08-10-21 @ 09:30]  5.5   |  16  |  6.98        Ca     8.6     [08-10-21 @ 09:30]      Mg     2.2     [08-10-21 @ 09:30]      Phos  3.2     [08-10-21 @ 09:30]    TPro  7.9  /  Alb  4.3  /  TBili  3.0  /  DBili  x   /  AST  13  /  ALT  6   /  AlkPhos  133  [08-10-21 @ 09:30]          Creatinine Trend:  SCr 6.98 [08-10 @ 09:30]  SCr 4.40 [07-31 @ 07:11]  SCr 5.71 [07-30 @ 08:28]  SCr 3.90 [07-29 @ 07:05]  SCr 1.88 [07-28 @ 20:55]    Urinalysis - [01-09-21 @ 12:32]      Color Yellow / Appearance Slightly Turbid / SG 1.021 / pH 8.0      Gluc Trace / Ketone Trace  / Bili Negative / Urobili Negative       Blood Trace / Protein 300 mg/dL / Leuk Est Large / Nitrite Negative      RBC 1 /  / Hyaline 8 / Gran  / Sq Epi  / Non Sq Epi 18 / Bacteria Many      Iron 42, TIBC 109, %sat 38      [04-11-21 @ 11:26]  Ferritin 1198      [04-13-21 @ 10:06]  HbA1c 4.9      [06-01-19 @ 00:46]    HBsAb 9.5      [04-10-21 @ 23:35]  HBsAb Reactive      [07-29-21 @ 00:09]  HBsAg Nonreact      [07-29-21 @ 00:09]  HBcAb Nonreact      [07-29-21 @ 00:09]  HCV 0.19, Nonreact      [07-29-21 @ 00:09]  HIV Nonreact      [04-11-21 @ 08:31]    RAHUL: titer 1:80, pattern Homogeneous      [05-16-18 @ 22:43]  C3 Complement 100      [01-19-17 @ 20:01]  C4 Complement 32      [01-19-17 @ 20:01]  Rheumatoid Factor <7.0      [01-19-17 @ 20:01]  ANCA: cANCA Negative, pANCA Negative, atypical ANCA Negative      [01-19-17 @ 20:01]  anti-GBM <0.2      [01-20-17 @ 01:52]  ASLO 59      [01-19-17 @ 20:01]  Syphilis Screen (Treponema Pallidum Ab) Negative      [01-19-17 @ 20:01]  Free Light Chains: kappa 12.00, lambda 14.10, ratio = 0.85      [01-23 @ 14:39]  Immunofixation Serum:   No Monoclonal Band Identified      [01-23-17 @ 14:39]  SPEP Interpretation: Polyclonal Gammopathy      [01-23-17 @ 14:39]

## 2021-08-10 NOTE — CONSULT NOTE ADULT - ASSESSMENT
61F PMhx ESKD on HD MWF (at [facility] dialysis unit Follows with Dr. De León), HTN, MAK 2 + polycythemia vera dx 2012, complicated by splenomegaly and history of splenic vein thrombosis 2015, now progressing to secondary myelofibrosis, presents to [facility] after being called for a recent hgb of 6.9 with repeat 5.8. Nephrology consulted for ESKD management.     ESRD- Pt. with ESRD on HD three times a week (MWF). She follows with Dr De León at [facility] dialysis. Last outpatient HD was on 8/6 via left AVF. Missed a session yesterday as was too weak. Labs reviewed, noted hyperkalemic. Will arrange for maintenance HD today. Volume status stable. Will receive 2 PRBCs with HD. Monitor labs. Adjust meds to HD.      Anemia- sent for low hgb of 6.9 with repeat 5.8- n.   Currently asymptomatic - Will receive 2 PRBCs with HD; monitor h/h, transfuse for hgb<7  will continue retacrit 15K TIW with HD.     Hypertension-  BP at target range. Monitor BP on current BP medication. Low salt diet.     Renal bone disease-  Patient with hyperphosphatemia - On phoslo with meals. Low phosphorus diet.  Monitor serum phosphorus.          If you have any questions, please feel free to contact shonda Jackson  Nephrology Fellow  Pager NS: [phone]/ LIJESUS: [phone]    (After 5 pm or on weekends please page the on-call fellow, can check [email or web address] for schedule. Login is db calderon, schedule under [facility] medicine, psych, derm)

## 2021-08-10 NOTE — ED PROVIDER NOTE - CRITICAL CARE ATTENDING CONTRIBUTION TO CARE
pt seen and examined with ACP    attending Gerardo: 61yF h/o COVID infection 4/2021, ESRD on HD MWF (chronic GN, last dialyzed 4 days ago), HTN, MAK 2 + polycythemia vera dx 2012, c/b splenomegaly and prior splenic vein thrombosis 2015 s/p 2nd dose COVID vaccine Pfizer 2 days ago sent in by nephro for low H/H and missed dialysis. Pt with2 days low grade fevers, chills, SOB, dizziness, and diarrhea since receiving vaccine. Nontoxic, no hypoxia or increased work of breathing on initial evaluation. Will obtain labs including type and screen, cxr, ekg, discuss with renal, likely admit

## 2021-08-10 NOTE — H&P ADULT - NSHPPHYSICALEXAM_GEN_ALL_CORE
T(C): 37.1 (08-10-21 @ 13:50), Max: 37.7 (08-10-21 @ 09:08)  T(F): 98.8 (08-10-21 @ 13:50), Max: 99.8 (08-10-21 @ 09:08)  HR: 82 (08-10-21 @ 13:50) (78 - 90)  BP: 121/66 (08-10-21 @ 13:50) (116/69 - 137/69)  RR: 19 (08-10-21 @ 13:50) (15 - 20)  SpO2: 100% (08-10-21 @ 13:50) (99% - 100%)  Wt(kg): --    GENERAL: Well appearing, in NAD  EYES: EOMI, conjunctiva and sclera clear  ENT: moist mucosa, no pharyngeal erythema  NECK: supple, no JVD  LUNG: Clear to auscultation bilaterally; No rales, rhonchi, wheezing, or rubs.   CVS: Regular rate and rhythm; No murmurs, rubs, or gallops  ABDOMEN: Soft, non tender, nondistended. +Bowel sounds.  EXTREMITIES:  no edema, no cyanosis  NEURO:  Alert & Oriented X3,  No focal deficits  PSYCH: Normal affect, normal mood  MUSCULOSKELETAL: FROM, no joint swelling  Skin: warm and dry, normal color

## 2021-08-10 NOTE — ED ADULT TRIAGE NOTE - CHIEF COMPLAINT QUOTE
Pt presents to ED with c/o fever, muscle aches, and headache after 1st dose of pfizer vaccine.  PT needs dialysis.  MWF  Unable to yesterday because of fever
18:13

## 2021-08-10 NOTE — ED ADULT NURSE NOTE - CHIEF COMPLAINT QUOTE
Pt presents to ED with c/o fever, muscle aches, and headache after 1st dose of pfizer vaccine.  PT needs dialysis.  MWF  Unable to yesterday because of fever

## 2021-08-10 NOTE — H&P ADULT - PROBLEM SELECTOR PLAN 2
Fevers, chills, diarrhea and myalgia likely post vaccination effect  Monitor and continue symptomatic management

## 2021-08-10 NOTE — H&P ADULT - PROBLEM SELECTOR PLAN 3
EGD 1/2021 with gastric varices  Cont propanolol 10mg daily  GI consult as above  Cont pantoprazole 40mg BID and sucralfate TID

## 2021-08-10 NOTE — H&P ADULT - ASSESSMENT
61F with PMHx COVID 4/2021, ESRD on HD MWF (chronic GN, last dialyzed Friday), HTN, MAK 2 + polycythemia vera dx 2012, complicated by splenomegaly and history of splenic vein thrombosis 2015 s/p 2nd dose COVID vaccine Pfizer 2 days ago now directed to the ED by nephro Dr. De León for low H/H. PAtient reported low grade fevers, dizziness, and diarrhea the night of the receiving COVID vaccine. Patient unable to get dialysis yesterday as she had low grade fever and myalgia. Patient still makes urine. Patient denied CP, SOB, abdominal pain, N/V/D, 61F with PMH of Noncirrhotic portal HTN c/b gastric varices, COVID infection 4/2021, ESRD on HD MWF (chronic GN, last dialyzed Friday), MAK 2 + polycythemia vera progressed to secondary myelofibrosis dx 2012, c/b splenomegaly and history of splenic vein thrombosis 2015, s/p 2nd dose COVID vaccine Pfizer 2 days ago who was sent to ER by nephrologist Dr. De León for workup and management of acute on chronic anemia

## 2021-08-11 LAB
ALBUMIN SERPL ELPH-MCNC: 3.9 G/DL — SIGNIFICANT CHANGE UP (ref 3.3–5)
ALP SERPL-CCNC: 123 U/L — HIGH (ref 40–120)
ALT FLD-CCNC: 7 U/L — LOW (ref 10–45)
ANION GAP SERPL CALC-SCNC: 15 MMOL/L — SIGNIFICANT CHANGE UP (ref 5–17)
APTT BLD: 36.1 SEC — HIGH (ref 27.5–35.5)
AST SERPL-CCNC: 11 U/L — SIGNIFICANT CHANGE UP (ref 10–40)
BASOPHILS # BLD AUTO: 0.03 K/UL — SIGNIFICANT CHANGE UP (ref 0–0.2)
BASOPHILS NFR BLD AUTO: 1 % — SIGNIFICANT CHANGE UP (ref 0–2)
BILIRUB SERPL-MCNC: 3.2 MG/DL — HIGH (ref 0.2–1.2)
BUN SERPL-MCNC: 22 MG/DL — SIGNIFICANT CHANGE UP (ref 7–23)
CALCIUM SERPL-MCNC: 9 MG/DL — SIGNIFICANT CHANGE UP (ref 8.4–10.5)
CHLORIDE SERPL-SCNC: 96 MMOL/L — SIGNIFICANT CHANGE UP (ref 96–108)
CO2 SERPL-SCNC: 25 MMOL/L — SIGNIFICANT CHANGE UP (ref 22–31)
COVID-19 SPIKE DOMAIN AB INTERP: POSITIVE
COVID-19 SPIKE DOMAIN ANTIBODY RESULT: >250 U/ML — HIGH
CREAT SERPL-MCNC: 3.91 MG/DL — HIGH (ref 0.5–1.3)
EOSINOPHIL # BLD AUTO: 0.1 K/UL — SIGNIFICANT CHANGE UP (ref 0–0.5)
EOSINOPHIL NFR BLD AUTO: 3 % — SIGNIFICANT CHANGE UP (ref 0–6)
FERRITIN SERPL-MCNC: 998 NG/ML — HIGH (ref 15–150)
FOLATE SERPL-MCNC: >20 NG/ML — SIGNIFICANT CHANGE UP
GLUCOSE SERPL-MCNC: 103 MG/DL — HIGH (ref 70–99)
HAPTOGLOB SERPL-MCNC: 37 MG/DL — SIGNIFICANT CHANGE UP (ref 34–200)
HCT VFR BLD CALC: 20.7 % — CRITICAL LOW (ref 34.5–45)
HGB BLD-MCNC: 6.6 G/DL — CRITICAL LOW (ref 11.5–15.5)
INR BLD: 1.2 RATIO — HIGH (ref 0.88–1.16)
IRON SATN MFR SERPL: 23 % — SIGNIFICANT CHANGE UP (ref 14–50)
IRON SATN MFR SERPL: 36 UG/DL — SIGNIFICANT CHANGE UP (ref 30–160)
LDH SERPL L TO P-CCNC: 327 U/L — HIGH (ref 50–242)
LYMPHOCYTES # BLD AUTO: 0.55 K/UL — LOW (ref 1–3.3)
LYMPHOCYTES # BLD AUTO: 16 % — SIGNIFICANT CHANGE UP (ref 13–44)
MCHC RBC-ENTMCNC: 30.3 PG — SIGNIFICANT CHANGE UP (ref 27–34)
MCHC RBC-ENTMCNC: 31.9 GM/DL — LOW (ref 32–36)
MCV RBC AUTO: 95 FL — SIGNIFICANT CHANGE UP (ref 80–100)
MONOCYTES # BLD AUTO: 0.1 K/UL — SIGNIFICANT CHANGE UP (ref 0–0.9)
MONOCYTES NFR BLD AUTO: 3 % — SIGNIFICANT CHANGE UP (ref 2–14)
MYELOCYTES NFR BLD: 2 % — HIGH (ref 0–0)
NEUTROPHILS # BLD AUTO: 2.58 K/UL — SIGNIFICANT CHANGE UP (ref 1.8–7.4)
NEUTROPHILS NFR BLD AUTO: 74 % — SIGNIFICANT CHANGE UP (ref 43–77)
NEUTS BAND # BLD: 1 % — SIGNIFICANT CHANGE UP (ref 0–8)
PLAT MORPH BLD: NORMAL — SIGNIFICANT CHANGE UP
PLATELET # BLD AUTO: 100 K/UL — LOW (ref 150–400)
POTASSIUM SERPL-MCNC: 4 MMOL/L — SIGNIFICANT CHANGE UP (ref 3.5–5.3)
POTASSIUM SERPL-SCNC: 4 MMOL/L — SIGNIFICANT CHANGE UP (ref 3.5–5.3)
PROT SERPL-MCNC: 7.5 G/DL — SIGNIFICANT CHANGE UP (ref 6–8.3)
PROTHROM AB SERPL-ACNC: 14.3 SEC — HIGH (ref 10.6–13.6)
RBC # BLD: 2.18 M/UL — LOW (ref 3.8–5.2)
RBC # BLD: 2.18 M/UL — LOW (ref 3.8–5.2)
RBC # FLD: 19.9 % — HIGH (ref 10.3–14.5)
RBC BLD AUTO: SIGNIFICANT CHANGE UP
RETICS #: 42.7 K/UL — SIGNIFICANT CHANGE UP (ref 25–125)
RETICS/RBC NFR: 2 % — SIGNIFICANT CHANGE UP (ref 0.5–2.5)
SARS-COV-2 IGG+IGM SERPL QL IA: >250 U/ML — HIGH
SARS-COV-2 IGG+IGM SERPL QL IA: POSITIVE
SODIUM SERPL-SCNC: 136 MMOL/L — SIGNIFICANT CHANGE UP (ref 135–145)
TIBC SERPL-MCNC: 152 UG/DL — LOW (ref 220–430)
TRANSFERRIN SERPL-MCNC: 121 MG/DL — LOW (ref 200–360)
UIBC SERPL-MCNC: 117 UG/DL — SIGNIFICANT CHANGE UP (ref 110–370)
VIT B12 SERPL-MCNC: 1181 PG/ML — SIGNIFICANT CHANGE UP (ref 232–1245)
WBC # BLD: 3.44 K/UL — LOW (ref 3.8–10.5)
WBC # FLD AUTO: 3.44 K/UL — LOW (ref 3.8–10.5)

## 2021-08-11 PROCEDURE — 99232 SBSQ HOSP IP/OBS MODERATE 35: CPT | Mod: GC

## 2021-08-11 PROCEDURE — 99233 SBSQ HOSP IP/OBS HIGH 50: CPT | Mod: GC

## 2021-08-11 RX ORDER — ERYTHROPOIETIN 10000 [IU]/ML
15000 INJECTION, SOLUTION INTRAVENOUS; SUBCUTANEOUS
Refills: 0 | Status: DISCONTINUED | OUTPATIENT
Start: 2021-08-11 | End: 2021-08-13

## 2021-08-11 RX ADMIN — ERYTHROPOIETIN 15000 UNIT(S): 10000 INJECTION, SOLUTION INTRAVENOUS; SUBCUTANEOUS at 21:57

## 2021-08-11 RX ADMIN — Medication 1334 MILLIGRAM(S): at 11:03

## 2021-08-11 RX ADMIN — PANTOPRAZOLE SODIUM 40 MILLIGRAM(S): 20 TABLET, DELAYED RELEASE ORAL at 17:41

## 2021-08-11 RX ADMIN — Medication 650 MILLIGRAM(S): at 00:27

## 2021-08-11 RX ADMIN — Medication 1 GRAM(S): at 06:05

## 2021-08-11 RX ADMIN — Medication 1 GRAM(S): at 17:42

## 2021-08-11 RX ADMIN — PANTOPRAZOLE SODIUM 40 MILLIGRAM(S): 20 TABLET, DELAYED RELEASE ORAL at 06:04

## 2021-08-11 RX ADMIN — Medication 1334 MILLIGRAM(S): at 16:30

## 2021-08-11 RX ADMIN — Medication 1334 MILLIGRAM(S): at 08:03

## 2021-08-11 RX ADMIN — GABAPENTIN 100 MILLIGRAM(S): 400 CAPSULE ORAL at 11:02

## 2021-08-11 RX ADMIN — Medication 1 TABLET(S): at 11:02

## 2021-08-11 NOTE — PATIENT PROFILE ADULT - LANGUAGE ASSISTANCE NEEDED
able to make needs known in english/No-Patient/Caregiver offered and refused free interpretation services.

## 2021-08-11 NOTE — PROGRESS NOTE ADULT - ASSESSMENT
61F with PMH of noncirrhotic portal HTN c/b gastric varices, COVID infection 4/2021, ESRD on HD MWF (chronic GN, last dialyzed Friday), MAK 2 + polycythemia vera progressed to secondary myelofibrosis dx 2012, c/b splenomegaly and history of splenic vein thrombosis 2015, s/p 2nd dose COVID vaccine Pfizer 2 days ago who was sent to ER by nephrologist Dr. De León for Hgb 5.8.     #Anemia  #Non-cirrhotic portal hypertension  #Gastric varices    Differential diagnosis for anemia includes hemolytic (in setting of elevated indirect bili, elevated LDH, and borderline haptoglobin) vs Bone marrow depression from myelofibrosis vs GI loss. In setting of concern for worsening of myelofibrosis, possible medication side effect (Jakafi, er Onc) no sign of over GI bleeding with current drop, other etiology mentioned above should be explored first before considering endoscopic evaluation. Discussed with her outpatient nephrologist who confirms this has been an recurring issue in outpatient with her baseline hemoglobin being around 7.      Recommendations   61F with PMH of noncirrhotic portal HTN c/b gastric varices, COVID infection 4/2021, ESRD on HD MWF (chronic GN, last dialyzed Friday), MAK 2 + polycythemia vera progressed to secondary myelofibrosis dx 2012, c/b splenomegaly and history of splenic vein thrombosis 2015, s/p 2nd dose COVID vaccine Pfizer 2 days ago who was sent to ER by nephrologist Dr. De León for Hgb 5.8.     #Anemia  #Non-cirrhotic portal hypertension  #Gastric varices    Differential diagnosis for anemia includes hemolytic (in setting of elevated indirect bili, elevated LDH, and borderline haptoglobin) vs Bone marrow depression from myelofibrosis vs GI loss. In setting of concern for worsening of myelofibrosis, possible medication side effect (Jakafi, er Onc) no sign of over GI bleeding with current drop, other etiology mentioned above should be explored first before considering endoscopic evaluation. Discussed with her outpatient nephrologist who confirms this has been an recurring issue in outpatient with her baseline hemoglobin being around 7.      Recommendations  -No indication for endoscopic evaluation for anemia given no sign of overt GI bleeding and alternative explanation for anemia at the moment.   -No further GI inpatient work up from our perspective, please reach out to us for any question or concern.    Lew Larios MD  Gastroenterology/Hepatology Fellow  1st option: [phone] (text or call), ONLY available from 7:00 am to 5:00 pm.   **Contact on-call GI fellow via answering service ([phone]) from 5pm-7am AND on weekends/holidays**  2nd option: Available via Microsoft Teams  3rd option: Pager: [phone]    NON-URGENT CONSULTS:  Please email [email or web address]  [email or web address]  AT NIGHT AND ON WEEKENDS:  Contact on-call GI fellow via answering service ([phone]) from 5pm-8am AND on weekends/holidays

## 2021-08-11 NOTE — PROGRESS NOTE ADULT - PROBLEM SELECTOR PLAN 3
EGD 1/2021 with gastric varices  Cont propanolol 10mg daily  Cont pantoprazole 40mg BID and sucralfate TID

## 2021-08-11 NOTE — PATIENT PROFILE ADULT - NSPROPTRIGHTSUPPORTPERSON_GEN_A_NUR
declines Hydroquinone Counseling:  Patient advised that medication may result in skin irritation, lightening (hypopigmentation), dryness, and burning.  In the event of skin irritation, the patient was advised to reduce the amount of the drug applied or use it less frequently.  Rarely, spots that are treated with hydroquinone can become darker (pseudoochronosis).  Should this occur, patient instructed to stop medication and call the office. The patient verbalized understanding of the proper use and possible adverse effects of hydroquinone.  All of the patient's questions and concerns were addressed.

## 2021-08-11 NOTE — PROGRESS NOTE ADULT - ASSESSMENT
61F PMhx ESKD on HD MWF (at [facility] dialysis unit Follows with Dr. 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 Nephrology consulted for ESKD management.     ESRD- Pt. with ESRD on HD three times a week (MWF). She follows with Dr De León at [facility] dialysis. Last outpatient HD was on 8/6 via left AVF. Missed a session  on 8/9 as was too weak. Labs reviewed, noted hyperkalemic on admission. s/p maintenance HD on 8/10 with 2 PRBCs during HD. Will put her back on MWF schedule today. Monitor labs. Adjust meds to HD.      Anemia- likely combination of myelofibrosis, ESRD & recent UGI bleed  s/p 2 PRBCs with HD on 8/10 with Hg only improved to 6.6; Transfuse 1 PRBC with HD today. Further w/u per GI. Consider Hemonc consult. monitor h/h, transfuse for hgb<7  will continue retacrit 15K TIW with HD.     Hypertension-  BP at target range. Monitor BP on current BP medication. Low salt diet.     Renal bone disease-  Patient with hyperphosphatemia - On phoslo with meals. Low phosphorus diet.  Monitor serum phosphorus.          If you have any questions, please feel free to contact shonda Jackson  Nephrology Fellow  Pager NS: [phone]/ [facility]: [phone]    (After 5 pm or on weekends please page the on-call fellow, can check [email or web address] for schedule. Login is db [email or web address], schedule under [facility] medicine, psych, derm)

## 2021-08-11 NOTE — PROGRESS NOTE ADULT - PROBLEM SELECTOR PLAN 7
Operative Eye:   left eye  Pre-Op Diagnosis:   epiretinal membrane  Post-Op Diagnosis:   same  Procedure Performed:  25+g PPV / MP  Attending Surgeon:   Tio Vidal MD  Assistant Surgeon:   none  Anesthesia:    general  Specimen:    none  Blood Loss :    minimal  Blood administered:  none  Implants/Grafts:  none  Findings:   epiretinal membrane    Description of the Procedure:  After identification of the patient and verification of the site and type of procedure, the patient was taken to the operating suite, and general anesthesia was administered. After a \"time-out,\" the operative eye was prepped and draped in the usual sterile ophthalmic manner and 5% Povidone Iodine was instilled onto the eye.    3.5 mm posterior to the limbus, in the inferotemporal quadrant, a 25+ gauge vitrectomy microcannula was used to place a 25+ gauge vitrectomy trocar in a beveled fashion. The infusion cannula was then secured into place. After visualizing the infusion cannula and ensuring its clearance, the infusion was turned on. In a similar fashion in the superonasal and superotemporal quadrants, 25+ gauge vitrectomy microcannulas were used to place additional trocars.    A wide-angle lens was placed over the eye and careful vitrectomy was performed. ICG was injected to stain the macular membranes. A flat lens was placed over the eye and the ERM-ILM complex was removed from the central macula using 25+ gauge ILM forceps. Scleral depression was performed to ensure there were no peripheral retinal breaks.    The trocars and infusion cannula were removed and the sclerotomies were noted to be self-sealing. Intraocular pressure was palpated to be normal. Sub-conjunctival injections of marcaine, dexamethasone, and ancef were given.    5% Povidone Iodine was once again instilled onto the eye, followed by 1% Atropine drops and antibiotic ointment. The eye was then sterilely patched and a Maynard shield was placed over the eye. The patient  tolerated the procedure well with no complications and was discharged to the PACU in stable condition.       SCDs in setting of anemia

## 2021-08-11 NOTE — PROGRESS NOTE ADULT - ASSESSMENT
61F with PMH of Noncirrhotic portal HTN c/b gastric varices, COVID infection 4/2021, ESRD on HD MWF (chronic GN, last dialyzed Friday), MAK 2 + polycythemia vera progressed to secondary myelofibrosis dx 2012, c/b splenomegaly and history of splenic vein thrombosis 2015, s/p 2nd dose COVID vaccine Pfizer 2 days ago who was sent to ER by nephrologist Dr. De León for workup and management of acute on chronic anemia

## 2021-08-11 NOTE — PATIENT PROFILE ADULT - NSASFALLNEEDSASSIST_GEN_A_NUR
yes Consent: Written consent was obtained and risks were reviewed including but not limited to scarring, infection, bleeding, scabbing, incomplete removal, nerve damage and allergy to anesthesia.

## 2021-08-12 ENCOUNTER — APPOINTMENT (OUTPATIENT)
Dept: HEMATOLOGY ONCOLOGY | Facility: CLINIC | Age: 61
End: 2021-08-12

## 2021-08-12 ENCOUNTER — TRANSCRIPTION ENCOUNTER (OUTPATIENT)
Age: 61
End: 2021-08-12

## 2021-08-12 LAB
ALBUMIN SERPL ELPH-MCNC: 4.3 G/DL — SIGNIFICANT CHANGE UP (ref 3.3–5)
ALP SERPL-CCNC: 134 U/L — HIGH (ref 40–120)
ALT FLD-CCNC: 8 U/L — LOW (ref 10–45)
ANION GAP SERPL CALC-SCNC: 15 MMOL/L — SIGNIFICANT CHANGE UP (ref 5–17)
AST SERPL-CCNC: 13 U/L — SIGNIFICANT CHANGE UP (ref 10–40)
BASOPHILS # BLD AUTO: 0.08 K/UL — SIGNIFICANT CHANGE UP (ref 0–0.2)
BASOPHILS NFR BLD AUTO: 1.2 % — SIGNIFICANT CHANGE UP (ref 0–2)
BILIRUB DIRECT SERPL-MCNC: 0.3 MG/DL — HIGH (ref 0–0.2)
BILIRUB INDIRECT FLD-MCNC: 3 MG/DL — HIGH (ref 0.2–1)
BILIRUB SERPL-MCNC: 3.3 MG/DL — HIGH (ref 0.2–1.2)
BUN SERPL-MCNC: 16 MG/DL — SIGNIFICANT CHANGE UP (ref 7–23)
CALCIUM SERPL-MCNC: 9.7 MG/DL — SIGNIFICANT CHANGE UP (ref 8.4–10.5)
CHLORIDE SERPL-SCNC: 95 MMOL/L — LOW (ref 96–108)
CO2 SERPL-SCNC: 25 MMOL/L — SIGNIFICANT CHANGE UP (ref 22–31)
CREAT SERPL-MCNC: 3.01 MG/DL — HIGH (ref 0.5–1.3)
EOSINOPHIL # BLD AUTO: 0.12 K/UL — SIGNIFICANT CHANGE UP (ref 0–0.5)
EOSINOPHIL NFR BLD AUTO: 1.9 % — SIGNIFICANT CHANGE UP (ref 0–6)
GLUCOSE SERPL-MCNC: 113 MG/DL — HIGH (ref 70–99)
HCT VFR BLD CALC: 28.1 % — LOW (ref 34.5–45)
HCT VFR BLD CALC: 28.9 % — LOW (ref 34.5–45)
HGB BLD-MCNC: 9.1 G/DL — LOW (ref 11.5–15.5)
HGB BLD-MCNC: 9.4 G/DL — LOW (ref 11.5–15.5)
IMM GRANULOCYTES NFR BLD AUTO: 3 % — HIGH (ref 0–1.5)
LYMPHOCYTES # BLD AUTO: 0.8 K/UL — LOW (ref 1–3.3)
LYMPHOCYTES # BLD AUTO: 12.5 % — LOW (ref 13–44)
MCHC RBC-ENTMCNC: 30.1 PG — SIGNIFICANT CHANGE UP (ref 27–34)
MCHC RBC-ENTMCNC: 30.3 PG — SIGNIFICANT CHANGE UP (ref 27–34)
MCHC RBC-ENTMCNC: 32.4 GM/DL — SIGNIFICANT CHANGE UP (ref 32–36)
MCHC RBC-ENTMCNC: 32.5 GM/DL — SIGNIFICANT CHANGE UP (ref 32–36)
MCV RBC AUTO: 93 FL — SIGNIFICANT CHANGE UP (ref 80–100)
MCV RBC AUTO: 93.2 FL — SIGNIFICANT CHANGE UP (ref 80–100)
MONOCYTES # BLD AUTO: 0.3 K/UL — SIGNIFICANT CHANGE UP (ref 0–0.9)
MONOCYTES NFR BLD AUTO: 4.7 % — SIGNIFICANT CHANGE UP (ref 2–14)
NEUTROPHILS # BLD AUTO: 4.92 K/UL — SIGNIFICANT CHANGE UP (ref 1.8–7.4)
NEUTROPHILS NFR BLD AUTO: 76.7 % — SIGNIFICANT CHANGE UP (ref 43–77)
NRBC # BLD: 0 /100 WBCS — SIGNIFICANT CHANGE UP (ref 0–0)
NRBC # BLD: 1 /100 WBCS — HIGH (ref 0–0)
PLATELET # BLD AUTO: 140 K/UL — LOW (ref 150–400)
PLATELET # BLD AUTO: 144 K/UL — LOW (ref 150–400)
POTASSIUM SERPL-MCNC: 4.2 MMOL/L — SIGNIFICANT CHANGE UP (ref 3.5–5.3)
POTASSIUM SERPL-SCNC: 4.2 MMOL/L — SIGNIFICANT CHANGE UP (ref 3.5–5.3)
PROT SERPL-MCNC: 8.1 G/DL — SIGNIFICANT CHANGE UP (ref 6–8.3)
RBC # BLD: 3.02 M/UL — LOW (ref 3.8–5.2)
RBC # BLD: 3.1 M/UL — LOW (ref 3.8–5.2)
RBC # FLD: 19.5 % — HIGH (ref 10.3–14.5)
RBC # FLD: 19.7 % — HIGH (ref 10.3–14.5)
SODIUM SERPL-SCNC: 135 MMOL/L — SIGNIFICANT CHANGE UP (ref 135–145)
WBC # BLD: 5.57 K/UL — SIGNIFICANT CHANGE UP (ref 3.8–10.5)
WBC # BLD: 6.41 K/UL — SIGNIFICANT CHANGE UP (ref 3.8–10.5)
WBC # FLD AUTO: 5.57 K/UL — SIGNIFICANT CHANGE UP (ref 3.8–10.5)
WBC # FLD AUTO: 6.41 K/UL — SIGNIFICANT CHANGE UP (ref 3.8–10.5)

## 2021-08-12 PROCEDURE — 99233 SBSQ HOSP IP/OBS HIGH 50: CPT

## 2021-08-12 RX ORDER — SENNA PLUS 8.6 MG/1
2 TABLET ORAL AT BEDTIME
Refills: 0 | Status: DISCONTINUED | OUTPATIENT
Start: 2021-08-12 | End: 2021-08-13

## 2021-08-12 RX ORDER — POLYETHYLENE GLYCOL 3350 17 G/17G
17 POWDER, FOR SOLUTION ORAL DAILY
Refills: 0 | Status: DISCONTINUED | OUTPATIENT
Start: 2021-08-12 | End: 2021-08-13

## 2021-08-12 RX ORDER — GABAPENTIN 400 MG/1
100 CAPSULE ORAL AT BEDTIME
Refills: 0 | Status: DISCONTINUED | OUTPATIENT
Start: 2021-08-12 | End: 2021-08-13

## 2021-08-12 RX ADMIN — Medication 1334 MILLIGRAM(S): at 08:21

## 2021-08-12 RX ADMIN — Medication 650 MILLIGRAM(S): at 01:42

## 2021-08-12 RX ADMIN — SENNA PLUS 2 TABLET(S): 8.6 TABLET ORAL at 21:02

## 2021-08-12 RX ADMIN — Medication 1334 MILLIGRAM(S): at 17:11

## 2021-08-12 RX ADMIN — PANTOPRAZOLE SODIUM 40 MILLIGRAM(S): 20 TABLET, DELAYED RELEASE ORAL at 17:10

## 2021-08-12 RX ADMIN — PANTOPRAZOLE SODIUM 40 MILLIGRAM(S): 20 TABLET, DELAYED RELEASE ORAL at 05:11

## 2021-08-12 RX ADMIN — Medication 1334 MILLIGRAM(S): at 11:03

## 2021-08-12 RX ADMIN — Medication 1 GRAM(S): at 17:11

## 2021-08-12 RX ADMIN — GABAPENTIN 100 MILLIGRAM(S): 400 CAPSULE ORAL at 21:01

## 2021-08-12 RX ADMIN — Medication 1 TABLET(S): at 11:03

## 2021-08-12 RX ADMIN — Medication 650 MILLIGRAM(S): at 02:05

## 2021-08-12 NOTE — PROGRESS NOTE ADULT - PROBLEM SELECTOR PLAN 4
HD today per nephrology team via LUE AVF  Usually MWF schedule  cont nephro-deonna  cont phoslo
HD today per nephrology team via LUE AVF  Usually MWF schedule  cont nephro-deonna  cont phoslo

## 2021-08-12 NOTE — DISCHARGE NOTE PROVIDER - NSDCMRMEDTOKEN_GEN_ALL_CORE_FT
calcium acetate 667 mg oral capsule: 2 tab(s) orally 3 times a day  epoetin filiberto: 21649 unit(s) intravenous Monday, Wednesday, and Friday at HD  gabapentin 100 mg oral capsule: 1 cap(s) orally 2 times a day  Jakafi 15 mg oral tablet: 1 tab(s) orally 3 times a week after HD  Nephro-Ben oral tablet: 1 tab(s) orally once a day  pantoprazole 40 mg oral delayed release tablet: 1 tab(s) orally 2 times a day   propranolol 10 mg oral tablet: 1 tab(s) orally once a day  sucralfate 1 g/10 mL oral suspension: 10 milliliter(s) orally 3 times a day    calcium acetate 667 mg oral capsule: 2 tab(s) orally 3 times a day  epoetin filiberto: 88441 unit(s) intravenous Monday, Wednesday, and Friday at HD  gabapentin 100 mg oral capsule: 1 cap(s) orally once a day (at bedtime)  Jakafi 10 mg oral tablet: 1 tab(s) orally 3 times a week after hemodialysis Monday, Wednesday, Friday  melatonin 3 mg oral tablet: 1 tab(s) orally once a day (at bedtime), As needed, Insomnia  Nephro-Ben oral tablet: 1 tab(s) orally once a day  pantoprazole 40 mg oral delayed release tablet: 1 tab(s) orally 2 times a day   polyethylene glycol 3350 oral powder for reconstitution: 17 gram(s) orally once a day  propranolol 10 mg oral tablet: 1 tab(s) orally once a day  senna oral tablet: 2 tab(s) orally once a day (at bedtime)  sucralfate 1 g/10 mL oral suspension: 10 milliliter(s) orally 3 times a day

## 2021-08-12 NOTE — PROGRESS NOTE ADULT - PROBLEM SELECTOR PROBLEM 2
Status post administration of all doses of COVID-19 vaccine series
Status post administration of all doses of COVID-19 vaccine series

## 2021-08-12 NOTE — DISCHARGE NOTE PROVIDER - NSDCFUSCHEDAPPT_GEN_ALL_CORE_FT
HU, MIHYEON ; 08/17/2021 ; [facility]  HU, MIHYEON ; 10/05/2021 ; [facility] [address] HU, MIHYEON ; 10/05/2021 ; [facility] [address]

## 2021-08-12 NOTE — DISCHARGE NOTE PROVIDER - CARE PROVIDERS DIRECT ADDRESSES
[email or web address], [email or web address]

## 2021-08-12 NOTE — DISCHARGE NOTE PROVIDER - PROVIDER TOKENS
PROVIDER:[TOKEN:[[id number]],FOLLOWUP:[1 week]],PROVIDER:[TOKEN:[[id number]],FOLLOWUP:[1 week]]

## 2021-08-12 NOTE — DISCHARGE NOTE PROVIDER - CARE PROVIDER_API CALL
Azucena De León)  Internal Medicine; Nephrology  [address] [facility] [address] [place] [address]  Phone: [phone]  Fax: [phone]  Follow Up Time: 1 week    Jacky Guo)  Hematology; Internal Medicine; Oncology  [address], [place] [id number]  Phone: [phone]  Fax: [phone]  Follow Up Time: 1 week

## 2021-08-12 NOTE — PROGRESS NOTE ADULT - ASSESSMENT
61F PMhx ESKD on HD MWF (at [facility] dialysis unit Follows with Dr. 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 Nephrology consulted for ESKD management.

## 2021-08-12 NOTE — PROGRESS NOTE ADULT - PROBLEM SELECTOR PLAN 1
Likely anemia of chronic disease in setting of renal disease c/b known secondary myelofibrosis  S/p PRBC   GI consult appreciated - No plan for Endoscopy  EPO with HD
Likely anemia of chronic disease in setting of renal disease c/b known secondary myelofibrosis  S/p two 1/2 units pRBC in ED  F/u post transfusion CBC  GI consult appreciated - No plan for Endoscopy  Trend CBC   EPO with HD

## 2021-08-12 NOTE — DISCHARGE NOTE PROVIDER - NSFOLLOWUPCLINICS_GEN_ALL_ED_FT
[facility]  Gastroenterology  [address] [place] [id number]  Phone: [phone]  Fax:   Follow Up Time: 1 week

## 2021-08-12 NOTE — PROGRESS NOTE ADULT - PROBLEM SELECTOR PLAN 6
c/b secondary myelofibrosis as seen on recent BM biopsy 6/21  Heme/onc consult appreciated  Cont home horacio (will need to bring in, non formulary)
c/b secondary myelofibrosis as seen on recent BM biopsy 6/21  Heme/onc f/up

## 2021-08-12 NOTE — DISCHARGE NOTE PROVIDER - NSDCCPCAREPLAN_GEN_ALL_CORE_FT
PRINCIPAL DISCHARGE DIAGNOSIS  Diagnosis: Anemia secondary to renal failure  Assessment and Plan of Treatment: You received 3 units of blood transfusion. Now stable. No signs of bleeding  Jakafi was held during the hospitilization, now suggested to start on 10 mg  3 times a week after HD instead of 15 mg  No GI endoscopy needed at this time.  Follow up closely with hematology, Dr. Jacky Guo in 1 week. Repeat labs CBC in 1 week  Continue retacerit week with renal      SECONDARY DISCHARGE DIAGNOSES  Diagnosis: Hyperkalemia  Assessment and Plan of Treatment: improved    Diagnosis: Polycythemia  Assessment and Plan of Treatment: Jakafi was held  Per hemetology, will now resume 10 mg three times a week after HD instead of 15 mg TID    Diagnosis: ESRD on dialysis  Assessment and Plan of Treatment: Continue HD, Monday Wednesday, Friday, last session on Friday 8/13  Follow up closely with Dr. De León

## 2021-08-12 NOTE — PROGRESS NOTE ADULT - PROBLEM SELECTOR PLAN 2
Fevers, chills, diarrhea and myalgia likely post vaccination effect  Monitor and continue symptomatic management
Fevers, chills, diarrhea and myalgia likely post vaccination effect  Monitor and continue symptomatic management

## 2021-08-12 NOTE — DISCHARGE NOTE PROVIDER - HOSPITAL COURSE
HPI:  61F with PMH of noncirrhotic portal HTN c/b gastric varices, COVID infection 4/2021, ESRD on HD MWF (chronic GN, last dialyzed Friday), MAK 2 + polycythemia vera progressed to secondary myelofibrosis dx 2012, c/b splenomegaly and history of splenic vein thrombosis 2015, s/p 2nd dose COVID vaccine Pfizer 2 days ago who was sent to ER by nephrologist Dr. De León for Hgb 5.8. Pt reports fevers, chills, headaches, nausea, retching and diarrhea X 2 days after recent 2nd dose of covid vaccine, but denies syncope, SOB, SANTAMARIA, melena, hematochezia or hematuria. She also reports 10-15 lbs unintentional weight loss X 6 months and early satiety. She was due to HD yesterday but couldnt go because she was febrile and lightheaded. Last EGD in january revealed portal gastropathy, and last colonoscopy was several years ago with no acute pathology. She was recently admitted last month and was planned for inpatient EGD per GI however pt was discharged instead.  In the ER she is hemodynamically stable and afebrile. Labs with pancytopenia with Hgb 5.8, K 5.5, sCr 6.98. CXR with possible RLL atelectasis vs PNA, but pt denies cough or SOB. Nephrology consulted for HD. She was ordered for two 1/2 units of blood, admitted to medicine for further management.     Hospital Course:  #acute on chronic anemia- multifactorial, baseline Hgb 8  - Hgb 5.8 status post total 3 units, repeat Hgb stable in 9's, mcv 100  - heme following  - Recently switched from Hydroxyurea to Jakafi, which can lower blood counts (Patient also has had worsening thrombocytopenia during this time)  - Patient denies aspirin or NSAID use  - iron panel  - ferritin, transferrin  - LDH  - haptoglobin  - B12, folate  - GI consulted > No indication for endoscopic evaluation for anemia given no sign of overt GI bleeding and alternative explanation for anemia at the moment.   - continue outpt INDER  - Jakafi held  - follow up with kathrine, Dr. Jacky Guo    #PV with transformation to secondary myelofibrosis  - With a PMH splenic vein thrombosis/splenic infarct, splenomegaly, leading to non-cirrhotic portal hypertension and gastric varices  - Previously diagnosed with PV, then on 6/15/21, had BM which showed JAK2 positive myeloproliferative neoplasm with secondary myelofibrosis; post-polycythemia vera myelofibrosis  - She was found to have a normal FISH, but her marrow showed a reticulin stain showing moderate to marked increase and trichrome stain showing increased collagen in the foci of marked reticulin (grade 2-3 fibrosis), which does increase the risk for AML transformation  - Hold Jakafi for now in the setting of acute anemia. However, upon restart, patient should be on 10 mg three times a week instead of her current 15 mg    #ESRD- MWF  - patient with missed hd monday due to feeling unwell after covid19 vaccine  - had hd tues and wednesday  - mild hyperkalemia- stable now; monitor k  - continue INDER as outpt   HPI:  61F with PMH of noncirrhotic portal HTN c/b gastric varices, COVID infection 4/2021, ESRD on HD MWF (chronic GN, last dialyzed Friday), MAK 2 + polycythemia vera progressed to secondary myelofibrosis dx 2012, c/b splenomegaly and history of splenic vein thrombosis 2015, s/p 2nd dose COVID vaccine Pfizer 2 days ago who was sent to ER by nephrologist Dr. De León for Hgb 5.8. Pt reports fevers, chills, headaches, nausea, retching and diarrhea X 2 days after recent 2nd dose of covid vaccine, but denies syncope, SOB, SANTAMARIA, melena, hematochezia or hematuria. She also reports 10-15 lbs unintentional weight loss X 6 months and early satiety. She was due to HD yesterday but couldnt go because she was febrile and lightheaded. Last EGD in january revealed portal gastropathy, and last colonoscopy was several years ago with no acute pathology. She was recently admitted last month and was planned for inpatient EGD per GI however pt was discharged instead.  In the ER she is hemodynamically stable and afebrile. Labs with pancytopenia with Hgb 5.8, K 5.5, sCr 6.98. CXR with possible RLL atelectasis vs PNA, but pt denies cough or SOB. Nephrology consulted for HD. She was ordered for two 1/2 units of blood, admitted to medicine for further management.     Hospital Course:  #acute on chronic anemia- multifactorial, baseline Hgb 8  - Hgb 5.8 status post total 3 units, repeat Hgb stable in 9's, mcv 100  - heme following  - Recently switched from Hydroxyurea to Jakafi, which can lower blood counts (Patient also has had worsening thrombocytopenia during this time)  - Patient denies aspirin or NSAID use  - iron panel neg for RAFIQ  - ferritin 998, transferrin 121  -   - haptoglobin 317  - B12 1181, folate >20  - FOBT neg  - GI consulted > No indication for endoscopic evaluation for anemia given no sign of overt GI bleeding and alternative explanation for anemia at the moment.   - continue outpt INDER  - Jakafi held during admission, heme recommending to decrease Jakafi from 15 mg to 10 mg TID on dispo  - BP soft during admission with SBP in 90's, repeat 100-120's. Pt dizzy yesterday now improved, tolerated HD. Reported mild weakness but able to walk around without difficulty. Tolerating diet.   - follow up with heme, Dr. Jacky Guo    #PV with transformation to secondary myelofibrosis  - With a PMH splenic vein thrombosis/splenic infarct, splenomegaly, leading to non-cirrhotic portal hypertension and gastric varices  - Previously diagnosed with PV, then on 6/15/21, had BM which showed JAK2 positive myeloproliferative neoplasm with secondary myelofibrosis; post-polycythemia vera myelofibrosis  - She was found to have a normal FISH, but her marrow showed a reticulin stain showing moderate to marked increase and trichrome stain showing increased collagen in the foci of marked reticulin (grade 2-3 fibrosis), which does increase the risk for AML transformation  - Hold Jakafi for now in the setting of acute anemia. However, upon restart, patient should be on 10 mg three times a week instead of her current 15 mg    #ESRD- MWF  - patient with missed hd monday due to feeling unwell after covid19 vaccine  - had hd tues and wednesday  - mild hyperkalemia- stable now; monitor k  - continue INDER as outpt  - last HD 8/13, next on Monday    Pt HDS and safe for discharge. Discussed with medicine attending. Med rec to be reviewed prior to discharge. Pt reports she feels much better and agrees to idea of going home.

## 2021-08-13 ENCOUNTER — TRANSCRIPTION ENCOUNTER (OUTPATIENT)
Age: 61
End: 2021-08-13

## 2021-08-13 VITALS
SYSTOLIC BLOOD PRESSURE: 101 MMHG | HEART RATE: 88 BPM | OXYGEN SATURATION: 100 % | RESPIRATION RATE: 18 BRPM | DIASTOLIC BLOOD PRESSURE: 73 MMHG | TEMPERATURE: 98 F

## 2021-08-13 LAB
ALBUMIN SERPL ELPH-MCNC: 4.1 G/DL — SIGNIFICANT CHANGE UP (ref 3.3–5)
ALBUMIN SERPL ELPH-MCNC: 4.3 G/DL — SIGNIFICANT CHANGE UP (ref 3.3–5)
ALP SERPL-CCNC: 129 U/L — HIGH (ref 40–120)
ALP SERPL-CCNC: 130 U/L — HIGH (ref 40–120)
ALT FLD-CCNC: 6 U/L — LOW (ref 10–45)
ALT FLD-CCNC: 7 U/L — LOW (ref 10–45)
ANION GAP SERPL CALC-SCNC: 14 MMOL/L — SIGNIFICANT CHANGE UP (ref 5–17)
ANION GAP SERPL CALC-SCNC: 19 MMOL/L — HIGH (ref 5–17)
AST SERPL-CCNC: 10 U/L — SIGNIFICANT CHANGE UP (ref 10–40)
AST SERPL-CCNC: 11 U/L — SIGNIFICANT CHANGE UP (ref 10–40)
BASOPHILS # BLD AUTO: 0.07 K/UL — SIGNIFICANT CHANGE UP (ref 0–0.2)
BASOPHILS NFR BLD AUTO: 1.2 % — SIGNIFICANT CHANGE UP (ref 0–2)
BILIRUB SERPL-MCNC: 2.6 MG/DL — HIGH (ref 0.2–1.2)
BILIRUB SERPL-MCNC: 2.7 MG/DL — HIGH (ref 0.2–1.2)
BLD GP AB SCN SERPL QL: NEGATIVE — SIGNIFICANT CHANGE UP
BUN SERPL-MCNC: 38 MG/DL — HIGH (ref 7–23)
BUN SERPL-MCNC: 38 MG/DL — HIGH (ref 7–23)
CALCIUM SERPL-MCNC: 10 MG/DL — SIGNIFICANT CHANGE UP (ref 8.4–10.5)
CALCIUM SERPL-MCNC: 9.9 MG/DL — SIGNIFICANT CHANGE UP (ref 8.4–10.5)
CHLORIDE SERPL-SCNC: 94 MMOL/L — LOW (ref 96–108)
CHLORIDE SERPL-SCNC: 94 MMOL/L — LOW (ref 96–108)
CO2 SERPL-SCNC: 22 MMOL/L — SIGNIFICANT CHANGE UP (ref 22–31)
CO2 SERPL-SCNC: 25 MMOL/L — SIGNIFICANT CHANGE UP (ref 22–31)
CREAT SERPL-MCNC: 5.7 MG/DL — HIGH (ref 0.5–1.3)
CREAT SERPL-MCNC: 5.91 MG/DL — HIGH (ref 0.5–1.3)
EOSINOPHIL # BLD AUTO: 0.16 K/UL — SIGNIFICANT CHANGE UP (ref 0–0.5)
EOSINOPHIL NFR BLD AUTO: 2.6 % — SIGNIFICANT CHANGE UP (ref 0–6)
GLUCOSE SERPL-MCNC: 110 MG/DL — HIGH (ref 70–99)
GLUCOSE SERPL-MCNC: 176 MG/DL — HIGH (ref 70–99)
HCT VFR BLD CALC: 27.1 % — LOW (ref 34.5–45)
HGB BLD-MCNC: 8.8 G/DL — LOW (ref 11.5–15.5)
IMM GRANULOCYTES NFR BLD AUTO: 2.6 % — HIGH (ref 0–1.5)
LYMPHOCYTES # BLD AUTO: 0.8 K/UL — LOW (ref 1–3.3)
LYMPHOCYTES # BLD AUTO: 13.2 % — SIGNIFICANT CHANGE UP (ref 13–44)
MAGNESIUM SERPL-MCNC: 2.2 MG/DL — SIGNIFICANT CHANGE UP (ref 1.6–2.6)
MCHC RBC-ENTMCNC: 30.3 PG — SIGNIFICANT CHANGE UP (ref 27–34)
MCHC RBC-ENTMCNC: 32.5 GM/DL — SIGNIFICANT CHANGE UP (ref 32–36)
MCV RBC AUTO: 93.4 FL — SIGNIFICANT CHANGE UP (ref 80–100)
MONOCYTES # BLD AUTO: 0.29 K/UL — SIGNIFICANT CHANGE UP (ref 0–0.9)
MONOCYTES NFR BLD AUTO: 4.8 % — SIGNIFICANT CHANGE UP (ref 2–14)
NEUTROPHILS # BLD AUTO: 4.59 K/UL — SIGNIFICANT CHANGE UP (ref 1.8–7.4)
NEUTROPHILS NFR BLD AUTO: 75.6 % — SIGNIFICANT CHANGE UP (ref 43–77)
NRBC # BLD: 0 /100 WBCS — SIGNIFICANT CHANGE UP (ref 0–0)
OB PNL STL: NEGATIVE — SIGNIFICANT CHANGE UP
PLATELET # BLD AUTO: 144 K/UL — LOW (ref 150–400)
POTASSIUM SERPL-MCNC: 4.4 MMOL/L — SIGNIFICANT CHANGE UP (ref 3.5–5.3)
POTASSIUM SERPL-MCNC: 4.6 MMOL/L — SIGNIFICANT CHANGE UP (ref 3.5–5.3)
POTASSIUM SERPL-SCNC: 4.4 MMOL/L — SIGNIFICANT CHANGE UP (ref 3.5–5.3)
POTASSIUM SERPL-SCNC: 4.6 MMOL/L — SIGNIFICANT CHANGE UP (ref 3.5–5.3)
PROT SERPL-MCNC: 7.8 G/DL — SIGNIFICANT CHANGE UP (ref 6–8.3)
PROT SERPL-MCNC: 8.1 G/DL — SIGNIFICANT CHANGE UP (ref 6–8.3)
RBC # BLD: 2.9 M/UL — LOW (ref 3.8–5.2)
RBC # FLD: 19.2 % — HIGH (ref 10.3–14.5)
RH IG SCN BLD-IMP: POSITIVE — SIGNIFICANT CHANGE UP
SARS-COV-2 RNA SPEC QL NAA+PROBE: SIGNIFICANT CHANGE UP
SODIUM SERPL-SCNC: 133 MMOL/L — LOW (ref 135–145)
SODIUM SERPL-SCNC: 135 MMOL/L — SIGNIFICANT CHANGE UP (ref 135–145)
WBC # BLD: 6.07 K/UL — SIGNIFICANT CHANGE UP (ref 3.8–10.5)
WBC # FLD AUTO: 6.07 K/UL — SIGNIFICANT CHANGE UP (ref 3.8–10.5)

## 2021-08-13 PROCEDURE — 83010 ASSAY OF HAPTOGLOBIN QUANT: CPT

## 2021-08-13 PROCEDURE — 85610 PROTHROMBIN TIME: CPT

## 2021-08-13 PROCEDURE — 86985 SPLIT BLOOD OR PRODUCTS: CPT

## 2021-08-13 PROCEDURE — 0225U NFCT DS DNA&RNA 21 SARSCOV2: CPT

## 2021-08-13 PROCEDURE — 99285 EMERGENCY DEPT VISIT HI MDM: CPT

## 2021-08-13 PROCEDURE — 85018 HEMOGLOBIN: CPT

## 2021-08-13 PROCEDURE — 87635 SARS-COV-2 COVID-19 AMP PRB: CPT

## 2021-08-13 PROCEDURE — 85730 THROMBOPLASTIN TIME PARTIAL: CPT

## 2021-08-13 PROCEDURE — 86923 COMPATIBILITY TEST ELECTRIC: CPT

## 2021-08-13 PROCEDURE — 82330 ASSAY OF CALCIUM: CPT

## 2021-08-13 PROCEDURE — 85025 COMPLETE CBC W/AUTO DIFF WBC: CPT

## 2021-08-13 PROCEDURE — 82746 ASSAY OF FOLIC ACID SERUM: CPT

## 2021-08-13 PROCEDURE — 85045 AUTOMATED RETICULOCYTE COUNT: CPT

## 2021-08-13 PROCEDURE — 82435 ASSAY OF BLOOD CHLORIDE: CPT

## 2021-08-13 PROCEDURE — 82728 ASSAY OF FERRITIN: CPT

## 2021-08-13 PROCEDURE — 83550 IRON BINDING TEST: CPT

## 2021-08-13 PROCEDURE — 86769 SARS-COV-2 COVID-19 ANTIBODY: CPT

## 2021-08-13 PROCEDURE — 83735 ASSAY OF MAGNESIUM: CPT

## 2021-08-13 PROCEDURE — 84132 ASSAY OF SERUM POTASSIUM: CPT

## 2021-08-13 PROCEDURE — 82947 ASSAY GLUCOSE BLOOD QUANT: CPT

## 2021-08-13 PROCEDURE — 86901 BLOOD TYPING SEROLOGIC RH(D): CPT

## 2021-08-13 PROCEDURE — 85014 HEMATOCRIT: CPT

## 2021-08-13 PROCEDURE — 80053 COMPREHEN METABOLIC PANEL: CPT

## 2021-08-13 PROCEDURE — P9016: CPT

## 2021-08-13 PROCEDURE — 80076 HEPATIC FUNCTION PANEL: CPT

## 2021-08-13 PROCEDURE — 36430 TRANSFUSION BLD/BLD COMPNT: CPT

## 2021-08-13 PROCEDURE — 83615 LACTATE (LD) (LDH) ENZYME: CPT

## 2021-08-13 PROCEDURE — 84100 ASSAY OF PHOSPHORUS: CPT

## 2021-08-13 PROCEDURE — 83540 ASSAY OF IRON: CPT

## 2021-08-13 PROCEDURE — 71045 X-RAY EXAM CHEST 1 VIEW: CPT

## 2021-08-13 PROCEDURE — 84466 ASSAY OF TRANSFERRIN: CPT

## 2021-08-13 PROCEDURE — 85027 COMPLETE CBC AUTOMATED: CPT

## 2021-08-13 PROCEDURE — 80048 BASIC METABOLIC PNL TOTAL CA: CPT

## 2021-08-13 PROCEDURE — 84295 ASSAY OF SERUM SODIUM: CPT

## 2021-08-13 PROCEDURE — 99261: CPT

## 2021-08-13 PROCEDURE — 82607 VITAMIN B-12: CPT

## 2021-08-13 PROCEDURE — 82803 BLOOD GASES ANY COMBINATION: CPT

## 2021-08-13 PROCEDURE — 86900 BLOOD TYPING SEROLOGIC ABO: CPT

## 2021-08-13 PROCEDURE — 87040 BLOOD CULTURE FOR BACTERIA: CPT

## 2021-08-13 PROCEDURE — P9011: CPT

## 2021-08-13 PROCEDURE — 99233 SBSQ HOSP IP/OBS HIGH 50: CPT | Mod: GC

## 2021-08-13 PROCEDURE — 82272 OCCULT BLD FECES 1-3 TESTS: CPT

## 2021-08-13 PROCEDURE — 83605 ASSAY OF LACTIC ACID: CPT

## 2021-08-13 PROCEDURE — 93005 ELECTROCARDIOGRAM TRACING: CPT

## 2021-08-13 PROCEDURE — 86850 RBC ANTIBODY SCREEN: CPT

## 2021-08-13 RX ORDER — SENNA PLUS 8.6 MG/1
2 TABLET ORAL
Qty: 0 | Refills: 0 | DISCHARGE
Start: 2021-08-13

## 2021-08-13 RX ORDER — POLYETHYLENE GLYCOL 3350 17 G/17G
17 POWDER, FOR SOLUTION ORAL
Qty: 0 | Refills: 0 | DISCHARGE
Start: 2021-08-13

## 2021-08-13 RX ORDER — RUXOLITINIB 25 MG/1
1 TABLET ORAL
Qty: 13 | Refills: 0
Start: 2021-08-13 | End: 2021-09-11

## 2021-08-13 RX ORDER — RUXOLITINIB 25 MG/1
1 TABLET ORAL
Qty: 0 | Refills: 0 | DISCHARGE

## 2021-08-13 RX ORDER — LANOLIN ALCOHOL/MO/W.PET/CERES
1 CREAM (GRAM) TOPICAL
Qty: 0 | Refills: 0 | DISCHARGE
Start: 2021-08-13

## 2021-08-13 RX ORDER — GABAPENTIN 400 MG/1
1 CAPSULE ORAL
Qty: 0 | Refills: 0 | DISCHARGE

## 2021-08-13 RX ADMIN — Medication 1334 MILLIGRAM(S): at 07:28

## 2021-08-13 RX ADMIN — PANTOPRAZOLE SODIUM 40 MILLIGRAM(S): 20 TABLET, DELAYED RELEASE ORAL at 07:28

## 2021-08-13 RX ADMIN — Medication 1 TABLET(S): at 12:46

## 2021-08-13 RX ADMIN — ERYTHROPOIETIN 15000 UNIT(S): 10000 INJECTION, SOLUTION INTRAVENOUS; SUBCUTANEOUS at 09:51

## 2021-08-13 RX ADMIN — Medication 1334 MILLIGRAM(S): at 12:46

## 2021-08-13 NOTE — PROGRESS NOTE ADULT - ASSESSMENT
61F PMhx ESKD on HD MWF (at [facility] dialysis unit Follows with Dr. 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 after being called for a recent hgb of 6.9 with repeat 5.8. Nephrology consulted for ESKD management.     ESRD- Pt. with ESRD on HD three times a week (MWF). She follows with Dr De León at [facility] dialysis. Last outpatient HD was on 8/6 via left AVF. Missed a session on 8/9 as was too weak. Labs reviewed, noted hyperkalemic on admission. s/p maintenance HD on 8/10 with 2 PRBCs during HD. Pt put back on MWF schedule. HD today with 2L UF. Pt's 's currently. Monitor labs. Adjust meds to HD.      Anemia- likely combination of myelofibrosis, ESRD & recent UGI bleed  Hg 8.8 s/p 3 PRBCs. No indication for endoscopic evaluation as per GI. monitor h/h, transfuse for hgb<7  will continue retacrit 15K TIW with HD.     Hypertension- hypotension resolved.  BP at target range. Monitor BP on current BP medication. Hold BP meds on HD days. HD today. Low salt diet.     Renal bone disease-  Patient with hyperphosphatemia - On phoslo with meals. Low phosphorus diet.  Monitor serum phosphorus.      fever, myalgias- likely covid vaccine related. Rule out other causes      If you have any questions, please feel free to contact shonda Jackson  Nephrology Fellow  Pager NS: [phone]/ [facility]: [phone]    (After 5 pm or on weekends please page the on-call fellow, can check [email or web address] for schedule. Login is db [email or web address], schedule under [facility] medicine, psych, derm)

## 2021-08-13 NOTE — PROGRESS NOTE ADULT - SUBJECTIVE AND OBJECTIVE BOX
[facility] Division of Kidney Diseases & Hypertension  FOLLOW UP NOTE  [phone]--------------------------------------------------------------------------------  Chief Complaint:Chronic kidney disease        24 hour events/subjective: Patient seen & examined. Labs & vitals reviewed. No complains. Pt's 's currently.  HD today        PAST HISTORY  --------------------------------------------------------------------------------  No significant changes to PMH, PSH, FHx, SHx, unless otherwise noted    ALLERGIES & MEDICATIONS  --------------------------------------------------------------------------------  Allergies    No Known Allergies    Intolerances      Standing Inpatient Medications  calcium acetate 1334 milliGRAM(s) Oral three times a day with meals  epoetin filiberto-epbx (RETACRIT) Injectable 34738 Unit(s) IV Push <User Schedule>  gabapentin 100 milliGRAM(s) Oral at bedtime  multivitamin 1 Tablet(s) Oral daily  pantoprazole    Tablet 40 milliGRAM(s) Oral two times a day  polyethylene glycol 3350 17 Gram(s) Oral daily  propranolol 10 milliGRAM(s) Oral daily  senna 2 Tablet(s) Oral at bedtime  sucralfate suspension 1 Gram(s) Oral two times a day    PRN Inpatient Medications  acetaminophen   Tablet .. 650 milliGRAM(s) Oral every 6 hours PRN  aluminum hydroxide/magnesium hydroxide/simethicone Suspension 30 milliLiter(s) Oral every 4 hours PRN  melatonin 3 milliGRAM(s) Oral at bedtime PRN  ondansetron Injectable 4 milliGRAM(s) IV Push every 8 hours PRN      REVIEW OF SYSTEMS  --------------------------------------------------------------------------------  Gen: No  fevers/chills  Respiratory: No dyspnea, cough  CV: No chest pain  GI: No abdominal pain, diarrhea,  nausea, vomiting  : No increased frequency, dysuria, hematuria  MSK:  no edema  Neuro: No dizziness/lightheadedness      All other systems were reviewed and are negative, except as noted.    VITALS/PHYSICAL EXAM  --------------------------------------------------------------------------------  T(C): 36.7 (08-13-21 @ 09:16), Max: 37.2 (08-12-21 @ 20:37)  HR: 87 (08-13-21 @ 09:16) (76 - 93)  BP: 124/68 (08-13-21 @ 09:16) (91/60 - 124/68)  RR: 18 (08-13-21 @ 09:16) (17 - 18)  SpO2: 100% (08-13-21 @ 09:16) (97% - 100%)  Wt(kg): --        08-12-21 @ 07:01  -  08-13-21 @ 07:00  --------------------------------------------------------  IN: 540 mL / OUT: 0 mL / NET: 540 mL      Physical Exam:  	Gen: NAD,   	HEENT: no jvp  	Pulm: CTA B/L  	CV: RRR, S1S2; no rub  	Back:  no sacral edema  	Abd: +BS, soft,  	: No suprapubic tenderness  	Ext: no edema  	Neuro: awake  	Psych: alert  	Skin: Warm,  	Vascular access: L AVF with thrill      LABS/STUDIES  --------------------------------------------------------------------------------              8.8    6.07  >-----------<  144      [08-13-21 @ 09:26]              27.1     135  |  94  |  38  ----------------------------<  176      [08-13-21 @ 09:26]  4.4   |  22  |  5.91        Ca     9.9     [08-13-21 @ 09:26]      Mg     2.2     [08-13-21 @ 06:36]    TPro  8.1  /  Alb  4.3  /  TBili  2.6  /  DBili  x   /  AST  10  /  ALT  6   /  AlkPhos  130  [08-13-21 @ 09:26]    PT/INR: PT 14.3 , INR 1.20       [08-11-21 @ 15:11]  PTT: 36.1       [08-11-21 @ 15:11]      Creatinine Trend:  SCr 5.91 [08-13 @ 09:26]  SCr 5.70 [08-13 @ 06:36]  SCr 3.01 [08-12 @ 06:25]  SCr 3.91 [08-11 @ 07:08]  SCr 6.98 [08-10 @ 09:30]        Iron 36, TIBC 152, %sat 23      [08-11-21 @ 09:35]  Ferritin 998      [08-11-21 @ 09:40]      
[facility] Division of Kidney Diseases & Hypertension  FOLLOW UP NOTE  [phone]--------------------------------------------------------------------------------  Chief Complaint:Chronic kidney disease        24 hour events/subjective: Patient seen & examined.  Denies SOB, leg edema, abdominal pain, nausea /vomiting, diarrhea or melena or bloody BM today      PAST HISTORY  --------------------------------------------------------------------------------  No significant changes to PMH, PSH, FHx, SHx, unless otherwise noted    ALLERGIES & MEDICATIONS  --------------------------------------------------------------------------------  Allergies    No Known Allergies    Intolerances      Standing Inpatient Medications  calcium acetate 1334 milliGRAM(s) Oral three times a day with meals  gabapentin 100 milliGRAM(s) Oral daily  multivitamin 1 Tablet(s) Oral daily  pantoprazole    Tablet 40 milliGRAM(s) Oral two times a day  propranolol 10 milliGRAM(s) Oral daily  sucralfate suspension 1 Gram(s) Oral two times a day    PRN Inpatient Medications  acetaminophen   Tablet .. 650 milliGRAM(s) Oral every 6 hours PRN  aluminum hydroxide/magnesium hydroxide/simethicone Suspension 30 milliLiter(s) Oral every 4 hours PRN  melatonin 3 milliGRAM(s) Oral at bedtime PRN  ondansetron Injectable 4 milliGRAM(s) IV Push every 8 hours PRN      REVIEW OF SYSTEMS  --------------------------------------------------------------------------------  Gen: No  fevers/chills  Respiratory: No dyspnea, cough  CV: No chest pain  GI: No abdominal pain, diarrhea,  nausea, vomiting  : No increased frequency, dysuria, hematuria  MSK:  no edema  Neuro: No dizziness/lightheadedness      All other systems were reviewed and are negative, except as noted.    VITALS/PHYSICAL EXAM  --------------------------------------------------------------------------------  T(C): 36.6 (08-11-21 @ 04:00), Max: 37.2 (08-10-21 @ 13:35)  HR: 81 (08-11-21 @ 04:00) (77 - 91)  BP: 94/58 (08-11-21 @ 04:00) (94/58 - 137/75)  RR: 18 (08-11-21 @ 04:00) (15 - 19)  SpO2: 97% (08-11-21 @ 04:00) (97% - 100%)  Wt(kg): --  Height (cm): 157.5 (08-10-21 @ 08:29)  Weight (kg): 52 (08-10-21 @ 08:29)  BMI (kg/m2): 21 (08-10-21 @ 08:29)  BSA (m2): 1.51 (08-10-21 @ 08:29)      08-10-21 @ 07:01  -  08-11-21 @ 07:00  --------------------------------------------------------  IN: 950 mL / OUT: 2950 mL / NET: -2000 mL      Physical Exam:  	Gen: NAD  	HEENT: MMM  	Pulm: CTA B/L  	CV: S1S2  	Abd: Soft, +BS   	Ext: No LE edema B/L, no asterixis  	Neuro: Awake, alert  	Skin: Warm and dry  	Vascular access: L AVF with thrill    LABS/STUDIES  --------------------------------------------------------------------------------              6.6    3.44  >-----------<  100      [08-11-21 @ 07:08]              20.7     136  |  96  |  22  ----------------------------<  103      [08-11-21 @ 07:08]  4.0   |  25  |  3.91        Ca     9.0     [08-11-21 @ 07:08]      Mg     2.2     [08-10-21 @ 09:30]      Phos  3.2     [08-10-21 @ 09:30]    TPro  7.5  /  Alb  3.9  /  TBili  3.2  /  DBili  x   /  AST  11  /  ALT  7   /  AlkPhos  123  [08-11-21 @ 07:08]              [08-11-21 @ 07:08]    Creatinine Trend:  SCr 3.91 [08-11 @ 07:08]  SCr 6.98 [08-10 @ 09:30]  SCr 4.40 [07-31 @ 07:11]  SCr 5.71 [07-30 @ 08:28]  SCr 3.90 [07-29 @ 07:05]            
    Interval Events:   Patient denies any abdominal pain, nausea /vomiting, diarrhea or melena or bloody bm since being admitted.       Hospital Medications:  acetaminophen   Tablet .. 650 milliGRAM(s) Oral every 6 hours PRN  aluminum hydroxide/magnesium hydroxide/simethicone Suspension 30 milliLiter(s) Oral every 4 hours PRN  calcium acetate 1334 milliGRAM(s) Oral three times a day with meals  gabapentin 100 milliGRAM(s) Oral daily  melatonin 3 milliGRAM(s) Oral at bedtime PRN  multivitamin 1 Tablet(s) Oral daily  ondansetron Injectable 4 milliGRAM(s) IV Push every 8 hours PRN  pantoprazole    Tablet 40 milliGRAM(s) Oral two times a day  propranolol 10 milliGRAM(s) Oral daily  sucralfate suspension 1 Gram(s) Oral two times a day        ROS:   General:  No fevers, chills or night sweats  ENT:  No sore throat or dysphagia  CV:  No pain or palpitations  Resp:  No dyspnea, cough or  wheezing  GI:  as above  Skin:  No rash or edema  Neuro: no weakness   Hematologic: no bleeding  Musculoskeletal: no muscle pain or join pain  Psych: no agitation      PHYSICAL EXAM:   Vital Signs:  Vital Signs Last 24 Hrs  T(C): 36.6 (11 Aug 2021 04:00), Max: 37.2 (10 Aug 2021 13:35)  T(F): 97.8 (11 Aug 2021 04:00), Max: 99 (10 Aug 2021 13:35)  HR: 81 (11 Aug 2021 04:00) (77 - 91)  BP: 94/58 (11 Aug 2021 04:00) (94/58 - 137/75)  BP(mean): --  RR: 18 (11 Aug 2021 04:00) (15 - 19)  SpO2: 97% (11 Aug 2021 04:00) (97% - 100%)  Daily     Daily     GENERAL:  NAD, Appears stated age  HEENT:  NC/AT,  conjunctivae clear and pink, sclera -anicteric  CHEST:  Normal Effort, Breath sounds clear  HEART:  RRR, S1 + S2, no murmurs  ABDOMEN:  Soft, non-tender, non-distended, normoactive bowel sounds,  no masses  EXTREMITIES:  no cyanosis or edema  SKIN:  Warm & Dry. No rash or erythema  NEURO:  Alert, oriented, no focal deficit    LABS:                        6.6    3.44  )-----------( 100      ( 11 Aug 2021 07:08 )             20.7     Mean Cell Volume: 95.0 fl (08-11-21 @ 07:08)    08-11    136  |  96  |  22  ----------------------------<  103<H>  4.0   |  25  |  3.91<H>    Ca    9.0      11 Aug 2021 07:08  Phos  3.2     08-10  Mg     2.2     08-10    TPro  7.5  /  Alb  3.9  /  TBili  3.2<H>  /  DBili  x   /  AST  11  /  ALT  7<L>  /  AlkPhos  123<H>  08-11    LIVER FUNCTIONS - ( 11 Aug 2021 07:08 )  Alb: 3.9 g/dL / Pro: 7.5 g/dL / ALK PHOS: 123 U/L / ALT: 7 U/L / AST: 11 U/L / GGT: x                                       6.6    3.44  )-----------( 100      ( 11 Aug 2021 07:08 )             20.7                         5.8    5.68  )-----------( 123      ( 10 Aug 2021 09:30 )             19.2       Imaging: Images reviewed no new images appreciated.    
[facility] DIVISION OF KIDNEY DISEASES AND HYPERTENSION -- FOLLOW UP NOTE  --------------------------------------------------------------------------------  Chief Complaint:/subjective: no complaints but BP low    24 hour events: as above        PAST HISTORY  --------------------------------------------------------------------------------  No significant changes to PMH, PSH, FHx, SHx, unless otherwise noted    ALLERGIES & MEDICATIONS  --------------------------------------------------------------------------------  Allergies    No Known Allergies    Intolerances      Standing Inpatient Medications  calcium acetate 1334 milliGRAM(s) Oral three times a day with meals  epoetin filiberto-epbx (RETACRIT) Injectable 60878 Unit(s) IV Push <User Schedule>  gabapentin 100 milliGRAM(s) Oral at bedtime  multivitamin 1 Tablet(s) Oral daily  pantoprazole    Tablet 40 milliGRAM(s) Oral two times a day  polyethylene glycol 3350 17 Gram(s) Oral daily  propranolol 10 milliGRAM(s) Oral daily  senna 2 Tablet(s) Oral at bedtime  sucralfate suspension 1 Gram(s) Oral two times a day    PRN Inpatient Medications  acetaminophen   Tablet .. 650 milliGRAM(s) Oral every 6 hours PRN  aluminum hydroxide/magnesium hydroxide/simethicone Suspension 30 milliLiter(s) Oral every 4 hours PRN  melatonin 3 milliGRAM(s) Oral at bedtime PRN  ondansetron Injectable 4 milliGRAM(s) IV Push every 8 hours PRN      REVIEW OF SYSTEMS  --------------------------------------------------------------------------------  Gen: No weight changes, fatigue, fevers/chills, weakness  Skin: No rashes  Head/Eyes/Ears/Mouth: No headache;   Respiratory: No dyspnea, cough  CV: No chest pain, PND, orthopnea  GI: No abdominal pain, diarrhea, constipation, nausea, vomiting  : No increased frequency, dysuria, hematuria, nocturia  MSK: No joint pain/swelling; no back pain; no edema  Neuro: No dizziness/lightheadedness, weakness  Heme: No easy bruising or bleeding  Psych: No significant nervousness, anxiety, stress, depression    All other systems were reviewed and are negative, except as noted.    VITALS/PHYSICAL EXAM  --------------------------------------------------------------------------------  T(C): 37 (08-12-21 @ 12:12), Max: 37.1 (08-11-21 @ 21:35)  HR: 88 (08-12-21 @ 13:13) (78 - 102)  BP: 100/63 (08-12-21 @ 13:13) (91/60 - 125/70)  RR: 18 (08-12-21 @ 12:12) (17 - 18)  SpO2: 100% (08-12-21 @ 12:12) (95% - 100%)  Wt(kg): --  Adult Advanced Hemodynamics Last 24 Hrs  ABP: --  ABP(mean): --  CVP(mm Hg): --  CO: --  CI: --  PA: --  PA(mean): --  PCWP: --  SVR: --  SVRI: --        08-11-21 @ 07:01  -  08-12-21 @ 07:00  --------------------------------------------------------  IN: 480 mL / OUT: 0 mL / NET: 480 mL      Physical Exam:  	Gen: NAD,   	HEENT: no jvp  	Pulm: CTA B/L  	CV: RRR, S1S2; no rub  	Back:  no sacral edema  	Abd: +BS, soft,  	: No suprapubic tenderness  	Ext: no edema  	Neuro: awake  	Psych: alert  	Skin: Warm,  	Vascular access:    LABS/STUDIES  --------------------------------------------------------------------------------              9.4    6.41  >-----------<  144      [08-12-21 @ 13:55]              28.9     Hemoglobin: 9.4 g/dL (08-12-21 @ 13:55)  Hemoglobin: 9.1 g/dL (08-12-21 @ 06:25)    Platelet Count - Automated: 144 K/uL (08-12-21 @ 13:55)  Platelet Count - Automated: 140 K/uL (08-12-21 @ 06:25)    135  |  95  |  16  ----------------------------<  113      [08-12-21 @ 06:25]  4.2   |  25  |  3.01        Ca     9.7     [08-12-21 @ 06:25]    TPro  7.5  /  Alb  3.9  /  TBili  3.2  /  DBili  x   /  AST  11  /  ALT  7   /  AlkPhos  123  [08-11-21 @ 07:08]    PT/INR: PT 14.3 , INR 1.20       [08-11-21 @ 15:11]  PTT: 36.1       [08-11-21 @ 15:11]          [08-11-21 @ 07:08]    Creatinine Trend:  SCr 3.01 [08-12 @ 06:25]  SCr 3.91 [08-11 @ 07:08]  SCr 6.98 [08-10 @ 09:30]  SCr 4.40 [07-31 @ 07:11]  SCr 5.71 [07-30 @ 08:28]        Iron 36, TIBC 152, %sat 23      [08-11-21 @ 09:35]  Ferritin 998      [08-11-21 @ 09:40]  HbA1c 4.9      [06-01-19 @ 00:46]    HBsAb Reactive      [07-29-21 @ 00:09]  HBsAg Nonreact      [07-29-21 @ 00:09]  HBcAb Nonreact      [07-29-21 @ 00:09]  HCV 0.19, Nonreact      [07-29-21 @ 00:09]    
Patient is a 61y old  Female who presents with a chief complaint of Anemia (12 Aug 2021 22:24)      SUBJECTIVE / OVERNIGHT EVENTS:    Events noted.  CONSTITUTIONAL: No fever,  or fatigue  RESPIRATORY: No cough, wheezing,  No shortness of breath  CARDIOVASCULAR: No chest pain, palpitations, dizziness, or leg swelling  GASTROINTESTINAL: No abdominal or epigastric pain. No nausea, vomiting.  NEUROLOGICAL: No headaches,     MEDICATIONS  (STANDING):  calcium acetate 1334 milliGRAM(s) Oral three times a day with meals  epoetin filiberto-epbx (RETACRIT) Injectable 65821 Unit(s) IV Push <User Schedule>  gabapentin 100 milliGRAM(s) Oral at bedtime  multivitamin 1 Tablet(s) Oral daily  pantoprazole    Tablet 40 milliGRAM(s) Oral two times a day  polyethylene glycol 3350 17 Gram(s) Oral daily  propranolol 10 milliGRAM(s) Oral daily  senna 2 Tablet(s) Oral at bedtime  sucralfate suspension 1 Gram(s) Oral two times a day    MEDICATIONS  (PRN):  acetaminophen   Tablet .. 650 milliGRAM(s) Oral every 6 hours PRN Temp greater or equal to 38.5C (101.3F), Mild Pain (1 - 3)  aluminum hydroxide/magnesium hydroxide/simethicone Suspension 30 milliLiter(s) Oral every 4 hours PRN Dyspepsia  melatonin 3 milliGRAM(s) Oral at bedtime PRN Insomnia  ondansetron Injectable 4 milliGRAM(s) IV Push every 8 hours PRN Nausea and/or Vomiting        CAPILLARY BLOOD GLUCOSE        I&O's Summary    11 Aug 2021 07:01  -  12 Aug 2021 07:00  --------------------------------------------------------  IN: 480 mL / OUT: 0 mL / NET: 480 mL    12 Aug 2021 07:01  -  12 Aug 2021 23:33  --------------------------------------------------------  IN: 540 mL / OUT: 0 mL / NET: 540 mL        T(C): 37.2 (08-12-21 @ 20:37), Max: 37.2 (08-12-21 @ 20:37)  HR: 93 (08-12-21 @ 20:37) (78 - 102)  BP: 97/63 (08-12-21 @ 20:37) (91/60 - 110/67)  RR: 17 (08-12-21 @ 20:37) (17 - 18)  SpO2: 97% (08-12-21 @ 20:37) (95% - 100%)    PHYSICAL EXAM:  GENERAL: NAD  NECK: Supple, No JVD  CHEST/LUNG: Clear to auscultation bilaterally; No wheezing.  HEART: Regular rate and rhythm; No murmurs, rubs, or gallops  ABDOMEN: Soft, Nontender, Nondistended; Bowel sounds present  EXTREMITIES:   No edema  NEUROLOGY: AAO X 3      LABS:                        9.4    6.41  )-----------( 144      ( 12 Aug 2021 13:55 )             28.9     08-12    135  |  95<L>  |  16  ----------------------------<  113<H>  4.2   |  25  |  3.01<H>    Ca    9.7      12 Aug 2021 06:25    TPro  8.1  /  Alb  4.3  /  TBili  3.3<H>  /  DBili  0.3<H>  /  AST  13  /  ALT  8<L>  /  AlkPhos  134<H>  08-12    PT/INR - ( 11 Aug 2021 15:11 )   PT: 14.3 sec;   INR: 1.20 ratio         PTT - ( 11 Aug 2021 15:11 )  PTT:36.1 sec        CAPILLARY BLOOD GLUCOSE            RADIOLOGY & ADDITIONAL TESTS:    Imaging Personally Reviewed:    Consultant(s) Notes Reviewed:      Care Discussed with Consultants/Other Providers:    Garrett Geiger MD, CMD, FACP    [address] [place] [address]  Office Tel: [phone]  Cell: [phone]  
Patient is a 61y old  Female who presents with a chief complaint of Anemia (11 Aug 2021 10:21)      SUBJECTIVE / OVERNIGHT EVENTS:    Events noted.  CONSTITUTIONAL: No fever,  or fatigue  RESPIRATORY: No cough, wheezing,  No shortness of breath  CARDIOVASCULAR: No chest pain, palpitations  GASTROINTESTINAL: No abdominal or epigastric pain.   NEUROLOGICAL: No headaches,     MEDICATIONS  (STANDING):  calcium acetate 1334 milliGRAM(s) Oral three times a day with meals  epoetin filiberto-epbx (RETACRIT) Injectable 09678 Unit(s) IV Push <User Schedule>  gabapentin 100 milliGRAM(s) Oral daily  multivitamin 1 Tablet(s) Oral daily  pantoprazole    Tablet 40 milliGRAM(s) Oral two times a day  propranolol 10 milliGRAM(s) Oral daily  sucralfate suspension 1 Gram(s) Oral two times a day    MEDICATIONS  (PRN):  acetaminophen   Tablet .. 650 milliGRAM(s) Oral every 6 hours PRN Temp greater or equal to 38.5C (101.3F), Mild Pain (1 - 3)  aluminum hydroxide/magnesium hydroxide/simethicone Suspension 30 milliLiter(s) Oral every 4 hours PRN Dyspepsia  melatonin 3 milliGRAM(s) Oral at bedtime PRN Insomnia  ondansetron Injectable 4 milliGRAM(s) IV Push every 8 hours PRN Nausea and/or Vomiting        CAPILLARY BLOOD GLUCOSE        I&O's Summary    10 Aug 2021 07:01  -  11 Aug 2021 07:00  --------------------------------------------------------  IN: 950 mL / OUT: 2950 mL / NET: -2000 mL    11 Aug 2021 07:01  -  12 Aug 2021 00:34  --------------------------------------------------------  IN: 480 mL / OUT: 0 mL / NET: 480 mL        T(C): 37.1 (08-11-21 @ 21:35), Max: 37.1 (08-11-21 @ 21:35)  HR: 85 (08-11-21 @ 21:35) (75 - 88)  BP: 125/70 (08-11-21 @ 21:35) (94/58 - 125/70)  RR: 18 (08-11-21 @ 21:35) (17 - 18)  SpO2: 97% (08-11-21 @ 21:35) (97% - 100%)    PHYSICAL EXAM:  GENERAL: NAD  NECK: Supple, No JVD  CHEST/LUNG: Clear to auscultation bilaterally; No wheezing.  HEART: Regular rate and rhythm; No murmurs, rubs, or gallops  ABDOMEN: Soft, Nontender, Nondistended; Bowel sounds present  EXTREMITIES:   No edema  NEUROLOGY: AAO X 3      LABS:                        6.6    3.44  )-----------( 100      ( 11 Aug 2021 07:08 )             20.7     08-11    136  |  96  |  22  ----------------------------<  103<H>  4.0   |  25  |  3.91<H>    Ca    9.0      11 Aug 2021 07:08  Phos  3.2     08-10  Mg     2.2     08-10    TPro  7.5  /  Alb  3.9  /  TBili  3.2<H>  /  DBili  x   /  AST  11  /  ALT  7<L>  /  AlkPhos  123<H>  08-11    PT/INR - ( 11 Aug 2021 15:11 )   PT: 14.3 sec;   INR: 1.20 ratio         PTT - ( 11 Aug 2021 15:11 )  PTT:36.1 sec        CAPILLARY BLOOD GLUCOSE            RADIOLOGY & ADDITIONAL TESTS:    Imaging Personally Reviewed:    Consultant(s) Notes Reviewed:      Care Discussed with Consultants/Other Providers:    Garrett Geiger MD, CMD, FACP    [address] [place] [address]  Office Tel: [phone]  Cell: [phone]

## 2021-08-13 NOTE — PROGRESS NOTE ADULT - ATTENDING COMMENTS
eskd hd mwf; missed hd monday due to feeling unwell after covid19 vaccine; sent to ED for anemia  had hd tues and wednesday  uf 2kg yesterday  mild hyperkalemia- stable now; monitor k  anemia -prbc as needed; monitor hb trend; cont outpatient INDER; need to discuss with heme whether she can get prbc at [facility]; pt known to dr yen whitman  fever, myalgias- workup neg thus far; received covid19 vaccine  hypotensive today- monitor; hold bp meds; possible hypovoemic; pt given salt packet to put on her food; if BP still low consider 250cc bolus as pt is symptomatic and feels dizzy
eskd hd mwf; missed hd monday due to feeling unwell after covid19 vaccine  sent to ED for anemia  s/p hd yesterday and plan for HD today to put back on schedule  mild hyperkalemia- stable  anemia -prbc as needed; monitor hb trend; cont outpatient INDER   fever, myalgias- workup neg thus far; received covid19 vaccine
Agree with above. As there are other potential causes of anemia (new therapy for PV), and given no overt bleeding, no plans for endoscopic evaluation at this time. Follow-up nephrology/hematology recommendations.
eskd hd mwf; missed hd monday due to feeling unwell after covid19 vaccine; sent to ED for anemia  Hd today  minimal UF   mild hyperkalemia- stable now; monitor k  anemia -prbc as needed; monitor hb trend; cont outpatient INDER 15K tiw with hd; need to discuss with heme whether she can get prbc at [facility]; pt known to dr yen whitman  fever, myalgias- workup neg thus far; received covid19 vaccine  hypotension yesterday- monitor bp today; minimal UF today with hd

## 2021-08-13 NOTE — DISCHARGE NOTE NURSING/CASE MANAGEMENT/SOCIAL WORK - NSDCVIVACCINE_GEN_ALL_CORE_FT
influenza, injectable, quadrivalent, preservative free; 20-Nov-2014 12:44; Aston Ulloa (RN); [employer]; [id number]; IntraMuscular; Deltoid Left.; 0.5 milliLiter(s);   influenza, injectable, quadrivalent, preservative free; 05-Oct-2016 18:15; Rosario James (RN); [employer]; [id number]; IntraMuscular; Deltoid Left.; 0.5 milliLiter(s); VIS (VIS Published: 07-Aug-2015, VIS Presented: 05-Oct-2016);

## 2021-08-13 NOTE — DISCHARGE NOTE NURSING/CASE MANAGEMENT/SOCIAL WORK - PATIENT PORTAL LINK FT
You can access the FollowMyHealth Patient Portal offered by [facility] by registering at the following website: [email or web address]. By joining [employer]’s FollowMyHealth portal, you will also be able to view your health information using other applications (apps) compatible with our system.

## 2021-08-17 ENCOUNTER — APPOINTMENT (OUTPATIENT)
Dept: HEMATOLOGY ONCOLOGY | Facility: CLINIC | Age: 61
End: 2021-08-17
Payer: COMMERCIAL

## 2021-08-17 PROCEDURE — 99214 OFFICE O/P EST MOD 30 MIN: CPT | Mod: 95

## 2021-08-18 ENCOUNTER — NON-APPOINTMENT (OUTPATIENT)
Age: 61
End: 2021-08-18

## 2021-08-18 NOTE — REVIEW OF SYSTEMS
[Negative] : Respiratory [FreeTextEntry2] : Weakness [FreeTextEntry7] : Abdominal pain particularly in the left upper quadrant is gone.

## 2021-08-18 NOTE — HISTORY OF PRESENT ILLNESS
[de-identified] : This is a 59 year old woman with a history of MAK 2 + polycythemia vera.   diagnosed in 2012. 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 Hollie Guzmán in [place] (# [phone]). She has had a bone marrow biopsy done at the time of diagnosis.  Was previously non complaint with therapy there, however has been quite complaint with the hydroxyurea since coming to [facility] fellows clinic here in 2017.  Patient now follows with Dr. Jacky Guo after closure of the fellows clinic.  \par \par Abdominal US done May 2017 showed: attenuated main splenic vein indicative of previous/chronic thrombosis with surrounding patent varicosities; Portal venous system is patent with hepatopedal blood flow; Patent hepatic veins.\par \par She was admitted to [place] from July 3-5, 2018 due to hyperkalemia and thrombosis of her AVF. Hematology was consulted and recommended phlebotomy, but patient refused. She was discharged on Hydrea 500mg on HD days.  which she is still on. As of 9/27/19 she has transferred from the hematology fellows clinic to my service at [facility].  \par \par \par  [de-identified] : [id number] June\par \par Called patient for routine telehealth.  Feels tired and had headaches. Was anemic and was transfused while at the hospital.  Derik could have been involved with this, and was held during the time of he transfusion and for the previous week since discharge.  \par \par We also discussed the prospects of stem cell transplant. This can not be achieved due to various reasons.  Patient has significant comorbidities including the most major problem being the dialysis causing a high risk of complications. Also her insurance would not cover this though this.

## 2021-08-18 NOTE — ASSESSMENT
[FreeTextEntry1] : This is a 58 woman with a history of Polycythemia Vera, MAK 2 + since 2012. 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  She is now under my super vision for the P. Vera.  Patient had a normal hemoglobin on last follow up in 2019. Hg 12.5g/dl, was unable to explain why it was suddenly normal at the time.  More recently admitted for abdominal pain  secondary to splenomegaly with splenic infarcts.  This had responded very well to the use of hydroxyurea. Hg came up with treatment up to 9.9 at one point, pain from splenomegaly decreased significantly though patient was feeling left upper quadrant pain.  After a month of Jakafi 15mg following dialysis 3 times a week, patient abdominal pain and left upper quadrant pain has resolved, however she became weak and anemic requiring transfusion at the hospital.  Patient now discharged. still feeling weak.  Explained to patient that this could have been progression of the myelofibrosis but could also be from  the Jakafi 15mg which causes anemia as a side effect. Will hold the Jakafi 10mg for a while longer, have patient return for CBC type and screen for possible transfusion, and then restart the Jakafi 10mg 3 times a week after dialysis if Hg remains stable.  Patient did seem to have some benefit to the quality of her abdominal pain from the larger dose.

## 2021-08-20 ENCOUNTER — OUTPATIENT (OUTPATIENT)
Dept: OUTPATIENT SERVICES | Facility: HOSPITAL | Age: 61
LOS: 1 days | Discharge: HOME | End: 2021-08-20

## 2021-08-20 ENCOUNTER — NON-APPOINTMENT (OUTPATIENT)
Age: 61
End: 2021-08-20

## 2021-08-20 DIAGNOSIS — T85.898A OTHER SPECIFIED COMPLICATION OF OTHER INTERNAL PROSTHETIC DEVICES, IMPLANTS AND GRAFTS, INITIAL ENCOUNTER: Chronic | ICD-10-CM

## 2021-08-20 DIAGNOSIS — Z99.2 DEPENDENCE ON RENAL DIALYSIS: Chronic | ICD-10-CM

## 2021-08-20 DIAGNOSIS — D75.1 SECONDARY POLYCYTHEMIA: ICD-10-CM

## 2021-08-20 DIAGNOSIS — I77.0 ARTERIOVENOUS FISTULA, ACQUIRED: Chronic | ICD-10-CM

## 2021-08-23 ENCOUNTER — OUTPATIENT (OUTPATIENT)
Dept: OUTPATIENT SERVICES | Facility: HOSPITAL | Age: 61
LOS: 1 days | End: 2021-08-23
Payer: MEDICAID

## 2021-08-23 ENCOUNTER — RESULT REVIEW (OUTPATIENT)
Age: 61
End: 2021-08-23

## 2021-08-23 ENCOUNTER — NON-APPOINTMENT (OUTPATIENT)
Age: 61
End: 2021-08-23

## 2021-08-23 ENCOUNTER — APPOINTMENT (OUTPATIENT)
Dept: HEMATOLOGY ONCOLOGY | Facility: CLINIC | Age: 61
End: 2021-08-23

## 2021-08-23 DIAGNOSIS — Z99.2 DEPENDENCE ON RENAL DIALYSIS: Chronic | ICD-10-CM

## 2021-08-23 DIAGNOSIS — D75.81 MYELOFIBROSIS: ICD-10-CM

## 2021-08-23 DIAGNOSIS — T85.898A OTHER SPECIFIED COMPLICATION OF OTHER INTERNAL PROSTHETIC DEVICES, IMPLANTS AND GRAFTS, INITIAL ENCOUNTER: Chronic | ICD-10-CM

## 2021-08-23 DIAGNOSIS — I77.0 ARTERIOVENOUS FISTULA, ACQUIRED: Chronic | ICD-10-CM

## 2021-08-23 LAB
BASOPHILS # BLD AUTO: 0.14 K/UL — SIGNIFICANT CHANGE UP (ref 0–0.2)
BASOPHILS NFR BLD AUTO: 2 % — SIGNIFICANT CHANGE UP (ref 0–2)
EOSINOPHIL # BLD AUTO: 0.14 K/UL — SIGNIFICANT CHANGE UP (ref 0–0.5)
EOSINOPHIL NFR BLD AUTO: 2 % — SIGNIFICANT CHANGE UP (ref 0–6)
HCT VFR BLD CALC: 25 % — LOW (ref 34.5–45)
HGB BLD-MCNC: 7.6 G/DL — LOW (ref 11.5–15.5)
LYMPHOCYTES # BLD AUTO: 0.71 K/UL — LOW (ref 1–3.3)
LYMPHOCYTES # BLD AUTO: 10 % — LOW (ref 13–44)
MCHC RBC-ENTMCNC: 29.9 PG — SIGNIFICANT CHANGE UP (ref 27–34)
MCHC RBC-ENTMCNC: 30.4 G/DL — LOW (ref 32–36)
MCV RBC AUTO: 98.4 FL — SIGNIFICANT CHANGE UP (ref 80–100)
METAMYELOCYTES # FLD: 1 % — HIGH (ref 0–0)
MONOCYTES # BLD AUTO: 0.71 K/UL — SIGNIFICANT CHANGE UP (ref 0–0.9)
MONOCYTES NFR BLD AUTO: 10 % — SIGNIFICANT CHANGE UP (ref 2–14)
MYELOCYTES NFR BLD: 1 % — HIGH (ref 0–0)
NEUTROPHILS # BLD AUTO: 5.28 K/UL — SIGNIFICANT CHANGE UP (ref 1.8–7.4)
NEUTROPHILS NFR BLD AUTO: 74 % — SIGNIFICANT CHANGE UP (ref 43–77)
NRBC # BLD: 2 /100 — HIGH (ref 0–0)
NRBC # BLD: SIGNIFICANT CHANGE UP /100 WBCS (ref 0–0)
PLAT MORPH BLD: NORMAL — SIGNIFICANT CHANGE UP
PLATELET # BLD AUTO: 128 K/UL — LOW (ref 150–400)
RBC # BLD: 2.54 M/UL — LOW (ref 3.8–5.2)
RBC # FLD: 19.9 % — HIGH (ref 10.3–14.5)
RBC BLD AUTO: SIGNIFICANT CHANGE UP
RETICS #: 79.2 K/UL — SIGNIFICANT CHANGE UP (ref 25–125)
RETICS/RBC NFR: 3.1 % — HIGH (ref 0.5–2.5)
WBC # BLD: 7.14 K/UL — SIGNIFICANT CHANGE UP (ref 3.8–10.5)
WBC # FLD AUTO: 7.14 K/UL — SIGNIFICANT CHANGE UP (ref 3.8–10.5)

## 2021-08-23 PROCEDURE — 86900 BLOOD TYPING SEROLOGIC ABO: CPT

## 2021-08-23 PROCEDURE — 86901 BLOOD TYPING SEROLOGIC RH(D): CPT

## 2021-08-23 PROCEDURE — 86850 RBC ANTIBODY SCREEN: CPT

## 2021-08-23 PROCEDURE — 86923 COMPATIBILITY TEST ELECTRIC: CPT

## 2021-08-24 ENCOUNTER — APPOINTMENT (OUTPATIENT)
Dept: INFUSION THERAPY | Facility: HOSPITAL | Age: 61
End: 2021-08-24

## 2021-08-24 LAB
ALBUMIN SERPL ELPH-MCNC: 4.2 G/DL
ALP BLD-CCNC: 152 U/L
ALT SERPL-CCNC: 10 U/L
ANION GAP SERPL CALC-SCNC: 12 MMOL/L
AST SERPL-CCNC: 18 U/L
BILIRUB SERPL-MCNC: 2.2 MG/DL
BUN SERPL-MCNC: 41 MG/DL
CALCIUM SERPL-MCNC: 9.1 MG/DL
CHLORIDE SERPL-SCNC: 105 MMOL/L
CO2 SERPL-SCNC: 22 MMOL/L
CREAT SERPL-MCNC: 7.27 MG/DL
FERRITIN SERPL-MCNC: 713 NG/ML
GLUCOSE SERPL-MCNC: 123 MG/DL
IRON SATN MFR SERPL: 25 %
IRON SERPL-MCNC: 43 UG/DL
POTASSIUM SERPL-SCNC: 7.2 MMOL/L
PROT SERPL-MCNC: 7.2 G/DL
SODIUM SERPL-SCNC: 139 MMOL/L
TIBC SERPL-MCNC: 173 UG/DL
UIBC SERPL-MCNC: 130 UG/DL

## 2021-09-04 ENCOUNTER — INPATIENT (INPATIENT)
Facility: HOSPITAL | Age: 61
LOS: 5 days | Discharge: HOME | DRG: 864 | End: 2021-09-10
Attending: INTERNAL MEDICINE | Admitting: INTERNAL MEDICINE
Payer: MEDICAID

## 2021-09-04 VITALS
TEMPERATURE: 103 F | DIASTOLIC BLOOD PRESSURE: 80 MMHG | OXYGEN SATURATION: 100 % | HEART RATE: 91 BPM | WEIGHT: 116.84 LBS | RESPIRATION RATE: 20 BRPM | HEIGHT: 62 IN | SYSTOLIC BLOOD PRESSURE: 146 MMHG

## 2021-09-04 DIAGNOSIS — Z99.2 DEPENDENCE ON RENAL DIALYSIS: Chronic | ICD-10-CM

## 2021-09-04 DIAGNOSIS — T85.898A OTHER SPECIFIED COMPLICATION OF OTHER INTERNAL PROSTHETIC DEVICES, IMPLANTS AND GRAFTS, INITIAL ENCOUNTER: Chronic | ICD-10-CM

## 2021-09-04 DIAGNOSIS — I77.0 ARTERIOVENOUS FISTULA, ACQUIRED: Chronic | ICD-10-CM

## 2021-09-04 LAB
BASE EXCESS BLDV CALC-SCNC: 1.1 MMOL/L — SIGNIFICANT CHANGE UP (ref -2–2)
CA-I SERPL-SCNC: 1 MMOL/L — LOW (ref 1.15–1.33)
CHLORIDE BLDV-SCNC: 100 MMOL/L — SIGNIFICANT CHANGE UP (ref 96–108)
CO2 BLDV-SCNC: 26 MMOL/L — SIGNIFICANT CHANGE UP (ref 22–26)
GAS PNL BLDV: 133 MMOL/L — LOW (ref 136–145)
GAS PNL BLDV: SIGNIFICANT CHANGE UP
GAS PNL BLDV: SIGNIFICANT CHANGE UP
GLUCOSE BLDV-MCNC: 114 MG/DL — HIGH (ref 70–99)
HCO3 BLDV-SCNC: 25 MMOL/L — SIGNIFICANT CHANGE UP (ref 22–29)
HCT VFR BLDA CALC: 23 % — LOW (ref 34.5–46.5)
HGB BLD CALC-MCNC: 7.5 G/DL — LOW (ref 11.7–16.1)
LACTATE BLDV-MCNC: 1.6 MMOL/L — SIGNIFICANT CHANGE UP (ref 0.7–2)
PCO2 BLDV: 34 MMHG — LOW (ref 39–42)
PH BLDV: 7.47 — HIGH (ref 7.32–7.43)
PO2 BLDV: 32 MMHG — SIGNIFICANT CHANGE UP (ref 25–45)
POTASSIUM BLDV-SCNC: 6.7 MMOL/L — CRITICAL HIGH (ref 3.5–5.1)
SAO2 % BLDV: 54.2 % — LOW (ref 67–88)

## 2021-09-04 PROCEDURE — 93010 ELECTROCARDIOGRAM REPORT: CPT

## 2021-09-04 PROCEDURE — 99285 EMERGENCY DEPT VISIT HI MDM: CPT

## 2021-09-04 RX ORDER — SODIUM CHLORIDE 9 MG/ML
500 INJECTION INTRAMUSCULAR; INTRAVENOUS; SUBCUTANEOUS ONCE
Refills: 0 | Status: COMPLETED | OUTPATIENT
Start: 2021-09-04 | End: 2021-09-04

## 2021-09-04 RX ORDER — ACETAMINOPHEN 500 MG
650 TABLET ORAL ONCE
Refills: 0 | Status: COMPLETED | OUTPATIENT
Start: 2021-09-04 | End: 2021-09-04

## 2021-09-04 RX ORDER — MORPHINE SULFATE 50 MG/1
4 CAPSULE, EXTENDED RELEASE ORAL ONCE
Refills: 0 | Status: DISCONTINUED | OUTPATIENT
Start: 2021-09-04 | End: 2021-09-04

## 2021-09-04 RX ADMIN — SODIUM CHLORIDE 500 MILLILITER(S): 9 INJECTION INTRAMUSCULAR; INTRAVENOUS; SUBCUTANEOUS at 23:35

## 2021-09-04 RX ADMIN — MORPHINE SULFATE 4 MILLIGRAM(S): 50 CAPSULE, EXTENDED RELEASE ORAL at 23:36

## 2021-09-04 RX ADMIN — Medication 650 MILLIGRAM(S): at 23:36

## 2021-09-04 NOTE — ED PROVIDER NOTE - ATTENDING CONTRIBUTION TO CARE
Attending MD Hanson: I personally have seen and examined this patient.  Resident note reviewed and agree on plan of care and except where noted.  See below for details.     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  Reports associated nausea, anorexia, denies vomiting, diarrhea.  Denies cough, shortness of breath, COVID contacts.  Denies chest pain, palpitations.  Reports had full HD on Friday.  A ten (10) point review of systems was negative other than as stated in the HPI or elsewhere in the chart.     Exam:   General: NAD  HENT: head NCAT, airway patent   Eyes: anicteric, no conjunctival injection  Lungs: lungs CTAB with good inspiratory effort, no wheezing, no rhonchi, no rales, no increased work of breathing  Cardiac: +S1S2, no r/g, +murmur, large LUE AVF with bruit and thrill, +vasculature on chest noted  GI: abdomen soft with +BS, NT, ND  : no CVAT  MSK: FROM at neck, no tenderness to midline palpation  Neuro: moving all extremities spontaneously, sensory grossly intact, no gross neuro deficits  Psych: normal mood and affect    A/P: 61F with fevers and malaise, will proceed with infectious work up including CXR for PNA, RVP for URI, UA for UTI, labs for metabolic derangement, liver dysfunction, will need admission, nephro    ADDENDUM: EKG noted, compared to previous, similar, hemolyzed K noted, will await repeat, Lokelma

## 2021-09-04 NOTE — ED PROVIDER NOTE - CLINICAL SUMMARY MEDICAL DECISION MAKING FREE TEXT BOX
Patsy Morataya MD, PGY-2: 61F with PMH of noncirrhotic portal HTN c/b gastric varices, COVID infection 4/2021, ESRD on HD MWF (chronic GN, last dialyzed Friday), MAK 2 + polycythemia vera progressed to secondary myelofibrosis dx 2012, c/b splenomegaly and history of splenic vein thrombosis 2015, p/w fevers and myalgias x hours. VS + fevers, not hypotensive or tachycardic. ddx includes fever s/t viral vs. bacterial illness, consider fevers s/t oncological process in setting of myelofibrosis. plan for sepsis w/u

## 2021-09-04 NOTE — ED PROVIDER NOTE - PROGRESS NOTE DETAILS
ED Sign Out, eval for fever, generalized pain/fatigue, complicated by ESRD and significant comorbidities, pending labs/imaging and close reassessments for tx and planned admission -- Kota Matias MD

## 2021-09-04 NOTE — ED PROVIDER NOTE - NS ED ROS FT
Gen: + fevers  CV: Denies chest pain  Resp: Denies SOB, cough  GI: + nausea, denies vomiting  MSK: + myalgias  all other ROS negative unless indicated in HPI

## 2021-09-04 NOTE — ED PROVIDER NOTE - OBJECTIVE STATEMENT
61F with PMH of noncirrhotic portal HTN c/b gastric varices, COVID infection 4/2021, ESRD on HD MWF (chronic GN, last dialyzed Friday), MAK 2 + polycythemia vera progressed to secondary myelofibrosis dx 2012, c/b splenomegaly and history of splenic vein thrombosis 2015, p/w fevers and myalgias since this evening. endorses nausea, denies cough, SOB, diarrhea. had full session of HD 1d prior. not on chemo.

## 2021-09-05 DIAGNOSIS — R50.9 FEVER, UNSPECIFIED: ICD-10-CM

## 2021-09-05 LAB
ALBUMIN SERPL ELPH-MCNC: 3.7 G/DL — SIGNIFICANT CHANGE UP (ref 3.3–5)
ALBUMIN SERPL ELPH-MCNC: 4.5 G/DL — SIGNIFICANT CHANGE UP (ref 3.3–5)
ALP SERPL-CCNC: 114 U/L — SIGNIFICANT CHANGE UP (ref 40–120)
ALP SERPL-CCNC: 134 U/L — HIGH (ref 40–120)
ALT FLD-CCNC: 10 U/L — SIGNIFICANT CHANGE UP (ref 10–45)
ALT FLD-CCNC: 12 U/L — SIGNIFICANT CHANGE UP (ref 10–45)
ANION GAP SERPL CALC-SCNC: 14 MMOL/L — SIGNIFICANT CHANGE UP (ref 5–17)
ANION GAP SERPL CALC-SCNC: 15 MMOL/L — SIGNIFICANT CHANGE UP (ref 5–17)
ANION GAP SERPL CALC-SCNC: 17 MMOL/L — SIGNIFICANT CHANGE UP (ref 5–17)
ANISOCYTOSIS BLD QL: SIGNIFICANT CHANGE UP
APTT BLD: 34.5 SEC — SIGNIFICANT CHANGE UP (ref 27.5–35.5)
AST SERPL-CCNC: 18 U/L — SIGNIFICANT CHANGE UP (ref 10–40)
AST SERPL-CCNC: 35 U/L — SIGNIFICANT CHANGE UP (ref 10–40)
BASOPHILS # BLD AUTO: 0.08 K/UL — SIGNIFICANT CHANGE UP (ref 0–0.2)
BASOPHILS NFR BLD AUTO: 0.9 % — SIGNIFICANT CHANGE UP (ref 0–2)
BILIRUB SERPL-MCNC: 3.8 MG/DL — HIGH (ref 0.2–1.2)
BILIRUB SERPL-MCNC: 3.9 MG/DL — HIGH (ref 0.2–1.2)
BUN SERPL-MCNC: 40 MG/DL — HIGH (ref 7–23)
BUN SERPL-MCNC: 44 MG/DL — HIGH (ref 7–23)
BUN SERPL-MCNC: 50 MG/DL — HIGH (ref 7–23)
CALCIUM SERPL-MCNC: 8.2 MG/DL — LOW (ref 8.4–10.5)
CALCIUM SERPL-MCNC: 8.9 MG/DL — SIGNIFICANT CHANGE UP (ref 8.4–10.5)
CALCIUM SERPL-MCNC: 9 MG/DL — SIGNIFICANT CHANGE UP (ref 8.4–10.5)
CHLORIDE SERPL-SCNC: 97 MMOL/L — SIGNIFICANT CHANGE UP (ref 96–108)
CHLORIDE SERPL-SCNC: 97 MMOL/L — SIGNIFICANT CHANGE UP (ref 96–108)
CHLORIDE SERPL-SCNC: 99 MMOL/L — SIGNIFICANT CHANGE UP (ref 96–108)
CO2 SERPL-SCNC: 21 MMOL/L — LOW (ref 22–31)
CO2 SERPL-SCNC: 21 MMOL/L — LOW (ref 22–31)
CO2 SERPL-SCNC: 23 MMOL/L — SIGNIFICANT CHANGE UP (ref 22–31)
CREAT SERPL-MCNC: 4.68 MG/DL — HIGH (ref 0.5–1.3)
CREAT SERPL-MCNC: 5.02 MG/DL — HIGH (ref 0.5–1.3)
CREAT SERPL-MCNC: 5.47 MG/DL — HIGH (ref 0.5–1.3)
DACRYOCYTES BLD QL SMEAR: SLIGHT — SIGNIFICANT CHANGE UP
EOSINOPHIL # BLD AUTO: 0.23 K/UL — SIGNIFICANT CHANGE UP (ref 0–0.5)
EOSINOPHIL NFR BLD AUTO: 2.8 % — SIGNIFICANT CHANGE UP (ref 0–6)
GLUCOSE SERPL-MCNC: 106 MG/DL — HIGH (ref 70–99)
GLUCOSE SERPL-MCNC: 118 MG/DL — HIGH (ref 70–99)
GLUCOSE SERPL-MCNC: 143 MG/DL — HIGH (ref 70–99)
HCT VFR BLD CALC: 32.4 % — LOW (ref 34.5–45)
HGB BLD-MCNC: 10 G/DL — LOW (ref 11.5–15.5)
HYPOCHROMIA BLD QL: SLIGHT — SIGNIFICANT CHANGE UP
INR BLD: 1.23 RATIO — HIGH (ref 0.88–1.16)
LDH SERPL L TO P-CCNC: 635 U/L — HIGH (ref 50–242)
LYMPHOCYTES # BLD AUTO: 1.16 K/UL — SIGNIFICANT CHANGE UP (ref 1–3.3)
LYMPHOCYTES # BLD AUTO: 13.9 % — SIGNIFICANT CHANGE UP (ref 13–44)
MANUAL SMEAR VERIFICATION: SIGNIFICANT CHANGE UP
MCHC RBC-ENTMCNC: 29.1 PG — SIGNIFICANT CHANGE UP (ref 27–34)
MCHC RBC-ENTMCNC: 30.9 GM/DL — LOW (ref 32–36)
MCV RBC AUTO: 94.2 FL — SIGNIFICANT CHANGE UP (ref 80–100)
METAMYELOCYTES # FLD: 1.9 % — HIGH (ref 0–0)
MONOCYTES # BLD AUTO: 0 K/UL — SIGNIFICANT CHANGE UP (ref 0–0.9)
MONOCYTES NFR BLD AUTO: 0 % — LOW (ref 2–14)
MYELOCYTES NFR BLD: 4.6 % — HIGH (ref 0–0)
NEUTROPHILS # BLD AUTO: 6.33 K/UL — SIGNIFICANT CHANGE UP (ref 1.8–7.4)
NEUTROPHILS NFR BLD AUTO: 69.4 % — SIGNIFICANT CHANGE UP (ref 43–77)
NEUTS BAND # BLD: 6.5 % — SIGNIFICANT CHANGE UP (ref 0–8)
NRBC # BLD: 7 /100 — HIGH (ref 0–0)
OVALOCYTES BLD QL SMEAR: SLIGHT — SIGNIFICANT CHANGE UP
PLAT MORPH BLD: NORMAL — SIGNIFICANT CHANGE UP
PLATELET # BLD AUTO: 168 K/UL — SIGNIFICANT CHANGE UP (ref 150–400)
POIKILOCYTOSIS BLD QL AUTO: SLIGHT — SIGNIFICANT CHANGE UP
POLYCHROMASIA BLD QL SMEAR: SLIGHT — SIGNIFICANT CHANGE UP
POTASSIUM SERPL-MCNC: 5.1 MMOL/L — SIGNIFICANT CHANGE UP (ref 3.5–5.3)
POTASSIUM SERPL-MCNC: 5.9 MMOL/L — HIGH (ref 3.5–5.3)
POTASSIUM SERPL-MCNC: 6.6 MMOL/L — CRITICAL HIGH (ref 3.5–5.3)
POTASSIUM SERPL-SCNC: 5.1 MMOL/L — SIGNIFICANT CHANGE UP (ref 3.5–5.3)
POTASSIUM SERPL-SCNC: 5.9 MMOL/L — HIGH (ref 3.5–5.3)
POTASSIUM SERPL-SCNC: 6.6 MMOL/L — CRITICAL HIGH (ref 3.5–5.3)
PROT SERPL-MCNC: 7 G/DL — SIGNIFICANT CHANGE UP (ref 6–8.3)
PROT SERPL-MCNC: 8.3 G/DL — SIGNIFICANT CHANGE UP (ref 6–8.3)
PROTHROM AB SERPL-ACNC: 14.6 SEC — HIGH (ref 10.6–13.6)
RAPID RVP RESULT: SIGNIFICANT CHANGE UP
RBC # BLD: 3.44 M/UL — LOW (ref 3.8–5.2)
RBC # FLD: 21.4 % — HIGH (ref 10.3–14.5)
RBC BLD AUTO: ABNORMAL
SARS-COV-2 RNA SPEC QL NAA+PROBE: SIGNIFICANT CHANGE UP
SCHISTOCYTES BLD QL AUTO: SLIGHT — SIGNIFICANT CHANGE UP
SODIUM SERPL-SCNC: 134 MMOL/L — LOW (ref 135–145)
SODIUM SERPL-SCNC: 135 MMOL/L — SIGNIFICANT CHANGE UP (ref 135–145)
SODIUM SERPL-SCNC: 135 MMOL/L — SIGNIFICANT CHANGE UP (ref 135–145)
URATE SERPL-MCNC: 7.5 MG/DL — HIGH (ref 2.5–7)
WBC # BLD: 8.34 K/UL — SIGNIFICANT CHANGE UP (ref 3.8–10.5)
WBC # FLD AUTO: 8.34 K/UL — SIGNIFICANT CHANGE UP (ref 3.8–10.5)

## 2021-09-05 PROCEDURE — 71046 X-RAY EXAM CHEST 2 VIEWS: CPT | Mod: 26

## 2021-09-05 PROCEDURE — 99254 IP/OBS CNSLTJ NEW/EST MOD 60: CPT | Mod: GC

## 2021-09-05 PROCEDURE — 99254 IP/OBS CNSLTJ NEW/EST MOD 60: CPT

## 2021-09-05 RX ORDER — CALCIUM ACETATE 667 MG
1334 TABLET ORAL
Refills: 0 | Status: DISCONTINUED | OUTPATIENT
Start: 2021-09-05 | End: 2021-09-10

## 2021-09-05 RX ORDER — SUCRALFATE 1 G
1 TABLET ORAL
Refills: 0 | Status: DISCONTINUED | OUTPATIENT
Start: 2021-09-05 | End: 2021-09-10

## 2021-09-05 RX ORDER — CEFEPIME 1 G/1
INJECTION, POWDER, FOR SOLUTION INTRAMUSCULAR; INTRAVENOUS
Refills: 0 | Status: DISCONTINUED | OUTPATIENT
Start: 2021-09-05 | End: 2021-09-07

## 2021-09-05 RX ORDER — ERYTHROPOIETIN 10000 [IU]/ML
20000 INJECTION, SOLUTION INTRAVENOUS; SUBCUTANEOUS
Refills: 0 | Status: DISCONTINUED | OUTPATIENT
Start: 2021-09-05 | End: 2021-09-10

## 2021-09-05 RX ORDER — GABAPENTIN 400 MG/1
100 CAPSULE ORAL AT BEDTIME
Refills: 0 | Status: DISCONTINUED | OUTPATIENT
Start: 2021-09-05 | End: 2021-09-05

## 2021-09-05 RX ORDER — LANOLIN ALCOHOL/MO/W.PET/CERES
3 CREAM (GRAM) TOPICAL AT BEDTIME
Refills: 0 | Status: DISCONTINUED | OUTPATIENT
Start: 2021-09-05 | End: 2021-09-10

## 2021-09-05 RX ORDER — VANCOMYCIN HCL 1 G
1000 VIAL (EA) INTRAVENOUS ONCE
Refills: 0 | Status: COMPLETED | OUTPATIENT
Start: 2021-09-05 | End: 2021-09-05

## 2021-09-05 RX ORDER — SODIUM ZIRCONIUM CYCLOSILICATE 10 G/10G
10 POWDER, FOR SUSPENSION ORAL ONCE
Refills: 0 | Status: COMPLETED | OUTPATIENT
Start: 2021-09-05 | End: 2021-09-05

## 2021-09-05 RX ORDER — CEFEPIME 1 G/1
1000 INJECTION, POWDER, FOR SOLUTION INTRAMUSCULAR; INTRAVENOUS ONCE
Refills: 0 | Status: COMPLETED | OUTPATIENT
Start: 2021-09-05 | End: 2021-09-05

## 2021-09-05 RX ORDER — INFLUENZA VIRUS VACCINE 15; 15; 15; 15 UG/.5ML; UG/.5ML; UG/.5ML; UG/.5ML
0.5 SUSPENSION INTRAMUSCULAR ONCE
Refills: 0 | Status: COMPLETED | OUTPATIENT
Start: 2021-09-05 | End: 2021-09-05

## 2021-09-05 RX ORDER — GABAPENTIN 400 MG/1
100 CAPSULE ORAL AT BEDTIME
Refills: 0 | Status: DISCONTINUED | OUTPATIENT
Start: 2021-09-05 | End: 2021-09-10

## 2021-09-05 RX ORDER — SENNA PLUS 8.6 MG/1
2 TABLET ORAL AT BEDTIME
Refills: 0 | Status: DISCONTINUED | OUTPATIENT
Start: 2021-09-05 | End: 2021-09-10

## 2021-09-05 RX ORDER — POLYETHYLENE GLYCOL 3350 17 G/17G
17 POWDER, FOR SOLUTION ORAL DAILY
Refills: 0 | Status: DISCONTINUED | OUTPATIENT
Start: 2021-09-05 | End: 2021-09-10

## 2021-09-05 RX ORDER — SODIUM ZIRCONIUM CYCLOSILICATE 10 G/10G
10 POWDER, FOR SUSPENSION ORAL THREE TIMES A DAY
Refills: 0 | Status: COMPLETED | OUTPATIENT
Start: 2021-09-05 | End: 2021-09-07

## 2021-09-05 RX ORDER — PANTOPRAZOLE SODIUM 20 MG/1
40 TABLET, DELAYED RELEASE ORAL
Refills: 0 | Status: DISCONTINUED | OUTPATIENT
Start: 2021-09-05 | End: 2021-09-10

## 2021-09-05 RX ORDER — CEFEPIME 1 G/1
1000 INJECTION, POWDER, FOR SOLUTION INTRAMUSCULAR; INTRAVENOUS EVERY 24 HOURS
Refills: 0 | Status: DISCONTINUED | OUTPATIENT
Start: 2021-09-06 | End: 2021-09-07

## 2021-09-05 RX ADMIN — MORPHINE SULFATE 4 MILLIGRAM(S): 50 CAPSULE, EXTENDED RELEASE ORAL at 00:06

## 2021-09-05 RX ADMIN — Medication 1334 MILLIGRAM(S): at 17:06

## 2021-09-05 RX ADMIN — CEFEPIME 100 MILLIGRAM(S): 1 INJECTION, POWDER, FOR SOLUTION INTRAMUSCULAR; INTRAVENOUS at 12:59

## 2021-09-05 RX ADMIN — Medication 1 GRAM(S): at 22:41

## 2021-09-05 RX ADMIN — Medication 1 GRAM(S): at 12:59

## 2021-09-05 RX ADMIN — SENNA PLUS 2 TABLET(S): 8.6 TABLET ORAL at 22:32

## 2021-09-05 RX ADMIN — PANTOPRAZOLE SODIUM 40 MILLIGRAM(S): 20 TABLET, DELAYED RELEASE ORAL at 13:00

## 2021-09-05 RX ADMIN — SODIUM ZIRCONIUM CYCLOSILICATE 10 GRAM(S): 10 POWDER, FOR SUSPENSION ORAL at 14:56

## 2021-09-05 RX ADMIN — SODIUM ZIRCONIUM CYCLOSILICATE 10 GRAM(S): 10 POWDER, FOR SUSPENSION ORAL at 08:14

## 2021-09-05 RX ADMIN — Medication 650 MILLIGRAM(S): at 00:06

## 2021-09-05 RX ADMIN — SODIUM ZIRCONIUM CYCLOSILICATE 10 GRAM(S): 10 POWDER, FOR SUSPENSION ORAL at 22:33

## 2021-09-05 RX ADMIN — Medication 1334 MILLIGRAM(S): at 12:59

## 2021-09-05 RX ADMIN — Medication 3 MILLIGRAM(S): at 22:33

## 2021-09-05 RX ADMIN — Medication 1 TABLET(S): at 13:00

## 2021-09-05 RX ADMIN — Medication 1 GRAM(S): at 17:05

## 2021-09-05 RX ADMIN — Medication 250 MILLIGRAM(S): at 14:58

## 2021-09-05 RX ADMIN — SODIUM ZIRCONIUM CYCLOSILICATE 10 GRAM(S): 10 POWDER, FOR SUSPENSION ORAL at 01:22

## 2021-09-05 NOTE — CONSULT NOTE ADULT - ATTENDING COMMENTS
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+myelofibrosis- cont epo 20K tiw with HD  #secondary hyperpth renal origin- on hectorol as outpt with hd  #hyperphosphatemia-   #access- left avf- seems infiltrated and pt states it has enlarged since last week  please get vascular access ultrasound and call [facility] surgery (dr stone/senait) for input  #fever workup 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+myelofibrosis- cont epo 20K tiw with HD  #secondary hyperpth renal origin- on hectorol as outpt with hd  #hyperphosphatemia-   #access- left avf- seems infiltrated and pt states it has enlarged since last week  please get vascular access ultrasound and call [facility] surgery (dr stone/senait) for input  #fever workup- check ct a/p for right flank pain, urine cx, blood cx, ?heme issue; this is not the first time she has had fevers like this; dose adjust antibiotics per dialysis dosing

## 2021-09-05 NOTE — CONSULT NOTE ADULT - ASSESSMENT
61F Croatian-speaking with PMH of noncirrhotic portal HTN c/b gastric varices, COVID infection 4/2021, ESRD on HD MWF (chronic GN, last dialyzed Friday), JAK2+ Polycthemia vera progressed to secondary myelofibrosis dx 2012 (currently on jafaki), c/b splenomegaly and history of splenic vein thrombosis 2015, p/w fevers and myalgias x1 d.  With fever to 103 and no R lower back tenderness.    SIRS, fever - Differential for fever includes infection(bacteremia, UTI, pyelo - as she does make urine and has lower back tenderness although without leukocytosis) vs due to myelofibrosis on MAK-inhibitor.    Rec:   - start cefepime 1gq24 for HD and vanco 1g x1  - check CT a/p w IV cont if possible  - f/u bl and ur clx  - consider heme consult, follows at leelee Hansen MD  Fellow, Infectious Diseases, PGY-5  Pager: [phone]  Before 9am or after 5pm/Weekends: Call [phone]

## 2021-09-05 NOTE — CONSULT NOTE ADULT - ASSESSMENT
61F PMhx ESKD on HD MWF (at [facility] dialysis unit Follows with Dr. De León), HTN, MAK 2 + polycythemia vera dx 2012, complicated by splenomegaly and history of splenic vein thrombosis 2015, now progressing to secondary myelofibrosis, presents to [facility] for fever, myalgias & nausea.  Nephrology consulted for ESKD management.           ESRD- Pt. with ESRD on HD three times a week (MWF). She follows with Dr De León at [facility] dialysis. Last outpatient HD was on 9/3 via left AVF. Labs reviewed, noted hyperkalemic. K 6.6 on arrival with moderate hemolysis. Repeat 5.9. Last full HD on Friday. Start lokelma 10gm tid. Repeat BMP tonight. Will do HD today if does not improve. Consent obtained in chart.  Volume status stable. Monitor labs. Adjust meds to HD. Discussed with primary team           Anemia-   Hg at goal  monitor h/h, transfuse for hgb<7  will continue retacrit 15K TIW with HD.       Hypertension-  BP at target range. Resume home BP medication. Low salt diet.       Renal bone disease-  Patient with hyperphosphatemia - On phoslo 667 2 tabs tid with meals. Low phosphorus diet.  Monitor serum phosphorus.          If you have any questions, please feel free to contact me  Bryanna Jackson  Nephrology Fellow  Pager NS: [phone]/ [place]: [phone]    (After 5 pm or on weekends please page the on-call fellow, can check [email or web address] for schedule. Login is db calderon, schedule under [facility] medicine, psych, derm)   61F PMhx ESKD on HD MWF (at [facility] dialysis unit Follows with Dr. De León), HTN, MAK 2 + polycythemia vera dx 2012, complicated by splenomegaly and history of splenic vein thrombosis 2015, now progressing to secondary myelofibrosis, presents to [facility] for fever, myalgias & nausea.  Nephrology consulted for ESKD management.           ESRD- Pt. with ESRD on HD three times a week (MWF). She follows with Dr De León at [facility] dialysis. Last outpatient HD was on 9/3 via left AVF. Labs reviewed, noted hyperkalemic. K 6.6 on arrival with moderate hemolysis. Repeat 5.9. Last full HD on Friday. Start lokelma 10gm tid. Repeat BMP with K 5.1. Does not require HD today. Maintenance HD in am. Consent obtained in chart.  Volume status stable. Monitor labs. Adjust meds to HD. Discussed with primary team           Anemia-   Hg at goal  monitor h/h, transfuse for hgb<7  will continue retacrit 15K TIW with HD.       Hypertension-  BP at target range. Resume home BP medication. Low salt diet.       Renal bone disease-  Patient with hyperphosphatemia - On phoslo 667 2 tabs tid with meals. Low phosphorus diet.  Monitor serum phosphorus.      Sepsis- check UA, Ucx, rest w/u per primary team      If you have any questions, please feel free to contact me  Bryanna Jackson  Nephrology Fellow  Pager NS: [phone]/ [facility]: [phone]    (After 5 pm or on weekends please page the on-call fellow, can check [email or web address] for schedule. Login is db calderon, schedule under [facility] medicine, psych, derm)   61F PMhx ESKD on HD MWF (at [facility] dialysis unit Follows with Dr. De León), HTN, MAK 2 + polycythemia vera dx 2012, complicated by splenomegaly and history of splenic vein thrombosis 2015, now progressing to secondary myelofibrosis, presents to [facility] for fever, myalgias & nausea.  Nephrology consulted for ESKD management.           ESRD- Pt. with ESRD on HD three times a week (MWF). She follows with Dr De León at [facility] dialysis. Last outpatient HD was on 9/3 via left AVF. Labs reviewed, noted hyperkalemic. K 6.6 on arrival with moderate hemolysis. Repeat 5.9. Last full HD on Friday. Start lokelma 10gm tid. Repeat BMP with K 5.1. Does not require HD today. Maintenance HD in am. Consent obtained in chart.  Volume status stable. Monitor labs. Adjust meds to HD. Discussed with primary team           Anemia-   Hg at goal  monitor h/h, transfuse for hgb<7  will continue retacrit 15K TIW with HD.       Hypertension-  BP at target range. Resume home BP medication. Low salt diet.       Renal bone disease-  Patient with hyperphosphatemia - On phoslo 667 2 tabs tid with meals. Low phosphorus diet.  Monitor serum phosphorus.      Sepsis- check UA, Ucx. Suggest CT A/P. Rest w/u per primary team      If you have any questions, please feel free to contact me  Bryanna Jackson  Nephrology Fellow  Pager NS: [phone]/ [facility]: [phone]    (After 5 pm or on weekends please page the on-call fellow, can check [email or web address] for schedule. Login is db calderon, schedule under [facility] medicine, psych, derm)   61F PMhx ESKD on HD MWF (at [facility] dialysis unit Follows with Dr. De León), HTN, MAK 2 + polycythemia vera dx 2012, complicated by splenomegaly and history of splenic vein thrombosis 2015, now progressing to secondary myelofibrosis, presents to [facility] for fever, myalgias & nausea.  Nephrology consulted for ESKD management.           ESRD- Pt. with ESRD on HD three times a week (MWF). She follows with Dr De León at [facility] dialysis. Last outpatient HD was on 9/3 via left AVF. Labs reviewed, noted hyperkalemic. K 6.6 on arrival with moderate hemolysis. Repeat 5.9. Last full HD on Friday. Start lokelma 10gm tid. Repeat BMP with K 5.1. Does not require HD today. Maintenance HD in am. Consent obtained in chart.  Volume status stable. Monitor labs. Adjust meds to HD. Discussed with primary team           Anemia-   Hg at goal  monitor h/h, transfuse for hgb<7  will continue epo 20K TIW with HD.       Hypertension-  BP at target range. Resume home BP medication. Low salt diet.       Renal bone disease-  Patient with hyperphosphatemia - On phoslo 667 2 tabs tid with meals. Low phosphorus diet.  Monitor serum phosphorus.      Sepsis- check UA, Ucx. Suggest CT A/P. Rest w/u per primary team      If you have any questions, please feel free to contact me  Bryanna Jackson  Nephrology Fellow  Pager NS: [phone]/ [facility]: [phone]    (After 5 pm or on weekends please page the on-call fellow, can check [email or web address] for schedule. Login is db calderon, schedule under [facility] medicine, psych, derm)

## 2021-09-05 NOTE — H&P ADULT - ASSESSMENT
The patient is a 61y Female complaining of fever.    Fever:    ID eval appreciated  IV Cefepime/ S/p one dose of vanco  F/up with Blood Cx  CT C/A/P    ESRD on HD:    Nephro f/up appreciated  On HD

## 2021-09-05 NOTE — PATIENT PROFILE ADULT - NSTRANSFERBELONGINGSRESP_GEN_A_NUR
Bedside handoff to Rhiannon Quinn. Pt stable, no s/s of distress. Opportunity for questions provided. Dual skin assessment completed at this time. yes

## 2021-09-05 NOTE — CONSULT NOTE ADULT - SUBJECTIVE AND OBJECTIVE BOX
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 Had full session of HD 1d prior. Not on chemo.  Nephrology called for ESRD management.  Pt gets HD MWF (at [facility] dialysis unit Follows with Dr. De León). She reports last HD was on Friday 9/3/21 via L AVF.     .                  Patient seen & examined. Denies chest pain, fever, chills, increased frequency, dysuria, hematuria, pus in urine, frothy urine, SOB, leg edema, loss of appetite, pruritis, N/V.      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    Cirrhosis of liver without ascites, unspecified hepatic cirrhosis type    Pancreatic cyst    Peritoneal dialysis catheter in place  Placed 8/9/2017    Hemoperitoneum as complication of peritoneal dialysis  s/p removal 9/18    AV fistula  Placed 9/18      FAMILY HISTORY:  Family history of hypertension in mother    Family history of malignant neoplasm (Grandparent)  head and neck in father    FH: cirrhosis (Sibling)      PAST SOCIAL HISTORY:    ALLERGIES & MEDICATIONS  --------------------------------------------------------------------------------  Allergies    No Known Allergies    Intolerances      Standing Inpatient Medications  calcium acetate 1334 milliGRAM(s) Oral three times a day with meals  gabapentin 100 milliGRAM(s) Oral at bedtime  Nephro-deonna 1 Tablet(s) Oral daily  pantoprazole    Tablet 40 milliGRAM(s) Oral before breakfast  polyethylene glycol 3350 17 Gram(s) Oral daily  propranolol 10 milliGRAM(s) Oral daily  senna 2 Tablet(s) Oral at bedtime  sodium zirconium cyclosilicate 10 Gram(s) Oral three times a day  sucralfate suspension 1 Gram(s) Oral four times a day    PRN Inpatient Medications  melatonin 3 milliGRAM(s) Oral at bedtime PRN      REVIEW OF SYSTEMS  --------------------------------------------------------------------------------  Gen: No  fevers/chills  Respiratory: No dyspnea, cough  CV: No chest pain  GI: No abdominal pain, diarrhea,  nausea, vomiting  : No increased frequency, dysuria, hematuria  MSK:  no edema  Neuro: No dizziness/lightheadedness    All other systems were reviewed and are negative, except as noted.    VITALS/PHYSICAL EXAM  --------------------------------------------------------------------------------  T(C): 36.6 (09-05-21 @ 08:37), Max: 39.4 (09-04-21 @ 22:07)  HR: 67 (09-05-21 @ 08:37) (67 - 101)  BP: 118/73 (09-05-21 @ 08:37) (103/62 - 146/80)  RR: 18 (09-05-21 @ 08:37) (17 - 20)  SpO2: 100% (09-05-21 @ 08:37) (96% - 100%)  Wt(kg): --  Height (cm): 157.5 (09-04-21 @ 22:07)  Weight (kg): 53 (09-04-21 @ 22:07)  BMI (kg/m2): 21.4 (09-04-21 @ 22:07)  BSA (m2): 1.52 (09-04-21 @ 22:07)      Physical Exam:  	Gen: NAD  	HEENT: MMM  	Pulm: CTA B/L  	CV: S1S2  	Abd: Soft, +BS   	Ext: No LE edema B/L, no asterixis  	Neuro: Awake, alert  	Skin: Warm and dry  	Vascular access:    LABS/STUDIES  --------------------------------------------------------------------------------              10.0   8.34  >-----------<  168      [09-04-21 @ 23:31]              32.4     134  |  99  |  44  ----------------------------<  143      [09-05-21 @ 03:41]  5.9   |  21  |  5.02        Ca     8.2     [09-05-21 @ 03:41]    TPro  7.0  /  Alb  3.7  /  TBili  3.9  /  DBili  x   /  AST  18  /  ALT  10  /  AlkPhos  114  [09-05-21 @ 03:41]    PT/INR: PT 14.6 , INR 1.23       [09-04-21 @ 23:31]  PTT: 34.5       [09-04-21 @ 23:31]    Uric acid 7.5      [09-04-21 @ 23:31]        [09-04-21 @ 23:31]    Creatinine Trend:  SCr 5.02 [09-05 @ 03:41]  SCr 4.68 [09-04 @ 23:31]  SCr 5.91 [08-13 @ 09:26]  SCr 5.70 [08-13 @ 06:36]  SCr 3.01 [08-12 @ 06:25]    Urinalysis - [01-09-21 @ 12:32]      Color Yellow / Appearance Slightly Turbid / SG 1.021 / pH 8.0      Gluc Trace / Ketone Trace  / Bili Negative / Urobili Negative       Blood Trace / Protein 300 mg/dL / Leuk Est Large / Nitrite Negative      RBC 1 /  / Hyaline 8 / Gran  / Sq Epi  / Non Sq Epi 18 / Bacteria Many      Iron 36, TIBC 152, %sat 23      [08-11-21 @ 09:35]  Ferritin 998      [08-11-21 @ 09:40]  HbA1c 4.9      [06-01-19 @ 00:46]    HBsAb 9.5      [04-10-21 @ 23:35]  HBsAb Reactive      [07-29-21 @ 00:09]  HBsAg Nonreact      [07-29-21 @ 00:09]  HBcAb Nonreact      [07-29-21 @ 00:09]  HCV 0.19, Nonreact      [07-29-21 @ 00:09]  HIV Nonreact      [04-11-21 @ 08:31]    RAHUL: titer 1:80, pattern Homogeneous      [05-16-18 @ 22:43]  C3 Complement 100      [01-19-17 @ 20:01]  C4 Complement 32      [01-19-17 @ 20:01]  Rheumatoid Factor <7.0      [01-19-17 @ 20:01]  ANCA: cANCA Negative, pANCA Negative, atypical ANCA Negative      [01-19-17 @ 20:01]  anti-GBM <0.2      [01-20-17 @ 01:52]  ASLO 59      [01-19-17 @ 20:01]  Syphilis Screen (Treponema Pallidum Ab) Negative      [01-19-17 @ 20:01]  Free Light Chains: kappa 12.00, lambda 14.10, ratio = 0.85      [01-23 @ 14:39]  Immunofixation Serum:   No Monoclonal Band Identified      [01-23-17 @ 14:39]  SPEP Interpretation: Polyclonal Gammopathy      [01-23-17 @ 14:39]   [facility] DIVISION OF KIDNEY DISEASES AND HYPERTENSION -- [phone]  -- INITIAL CONSULT NOTE  --------------------------------------------------------------------------------  HPI: 61F with PMH of noncirrhotic portal HTN c/b gastric varices, COVID infection 4/2021, ESRD on HD MWF (chronic GN, last dialyzed Friday), MAK 2 + polycythemia vera 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 Had full session of HD 1d prior. Not on chemo.  Nephrology called for ESRD management.  Pt gets HD MWF (at [facility] dialysis unit Follows with Dr. De León). She reports last HD was on Friday 9/3/21 via L AVF.     .                  Patient seen & examined. Pt complains of fever, R flank pain & tingling throughout the body yesterday. Yet makes urine. Denies dysuria, hematuria, pus in urine, frothy urine, SOB, leg edema, loss of appetite, pruritis, N/V.      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    Cirrhosis of liver without ascites, unspecified hepatic cirrhosis type    Pancreatic cyst    Peritoneal dialysis catheter in place  Placed 8/9/2017    Hemoperitoneum as complication of peritoneal dialysis  s/p removal 9/18    AV fistula  Placed 9/18      FAMILY HISTORY:  Family history of hypertension in mother    Family history of malignant neoplasm (Grandparent)  head and neck in father    FH: cirrhosis (Sibling)      PAST SOCIAL HISTORY:    ALLERGIES & MEDICATIONS  --------------------------------------------------------------------------------  Allergies    No Known Allergies    Intolerances      Standing Inpatient Medications  calcium acetate 1334 milliGRAM(s) Oral three times a day with meals  gabapentin 100 milliGRAM(s) Oral at bedtime  Nephro-deonna 1 Tablet(s) Oral daily  pantoprazole    Tablet 40 milliGRAM(s) Oral before breakfast  polyethylene glycol 3350 17 Gram(s) Oral daily  propranolol 10 milliGRAM(s) Oral daily  senna 2 Tablet(s) Oral at bedtime  sodium zirconium cyclosilicate 10 Gram(s) Oral three times a day  sucralfate suspension 1 Gram(s) Oral four times a day    PRN Inpatient Medications  melatonin 3 milliGRAM(s) Oral at bedtime PRN      REVIEW OF SYSTEMS  --------------------------------------------------------------------------------  Gen: No  fevers/chills  Respiratory: No dyspnea, cough  CV: No chest pain  GI: No abdominal pain, diarrhea,  nausea, vomiting  : No increased frequency, dysuria, hematuria  MSK:  no edema  Neuro: No dizziness/lightheadedness    All other systems were reviewed and are negative, except as noted.    VITALS/PHYSICAL EXAM  --------------------------------------------------------------------------------  T(C): 36.6 (09-05-21 @ 08:37), Max: 39.4 (09-04-21 @ 22:07)  HR: 67 (09-05-21 @ 08:37) (67 - 101)  BP: 118/73 (09-05-21 @ 08:37) (103/62 - 146/80)  RR: 18 (09-05-21 @ 08:37) (17 - 20)  SpO2: 100% (09-05-21 @ 08:37) (96% - 100%)  Wt(kg): --  Height (cm): 157.5 (09-04-21 @ 22:07)  Weight (kg): 53 (09-04-21 @ 22:07)  BMI (kg/m2): 21.4 (09-04-21 @ 22:07)  BSA (m2): 1.52 (09-04-21 @ 22:07)      Physical Exam:  	Gen: NAD  	HEENT: MMM  	Pulm: CTA B/L  	CV: S1S2  	Abd: Soft, +BS   	Ext: No LE edema B/L, no asterixis  	Neuro: Awake, alert  	Skin: Warm and dry  	Vascular access: LUE AVF with thrill & bruit, with aneurysmal expansion, collaterals on chest noted    LABS/STUDIES  --------------------------------------------------------------------------------              10.0   8.34  >-----------<  168      [09-04-21 @ 23:31]              32.4     134  |  99  |  44  ----------------------------<  143      [09-05-21 @ 03:41]  5.9   |  21  |  5.02        Ca     8.2     [09-05-21 @ 03:41]    TPro  7.0  /  Alb  3.7  /  TBili  3.9  /  DBili  x   /  AST  18  /  ALT  10  /  AlkPhos  114  [09-05-21 @ 03:41]    PT/INR: PT 14.6 , INR 1.23       [09-04-21 @ 23:31]  PTT: 34.5       [09-04-21 @ 23:31]    Uric acid 7.5      [09-04-21 @ 23:31]        [09-04-21 @ 23:31]    Creatinine Trend:  SCr 5.02 [09-05 @ 03:41]  SCr 4.68 [09-04 @ 23:31]  SCr 5.91 [08-13 @ 09:26]  SCr 5.70 [08-13 @ 06:36]  SCr 3.01 [08-12 @ 06:25]    Urinalysis - [01-09-21 @ 12:32]      Color Yellow / Appearance Slightly Turbid / SG 1.021 / pH 8.0      Gluc Trace / Ketone Trace  / Bili Negative / Urobili Negative       Blood Trace / Protein 300 mg/dL / Leuk Est Large / Nitrite Negative      RBC 1 /  / Hyaline 8 / Gran  / Sq Epi  / Non Sq Epi 18 / Bacteria Many      Iron 36, TIBC 152, %sat 23      [08-11-21 @ 09:35]  Ferritin 998      [08-11-21 @ 09:40]  HbA1c 4.9      [06-01-19 @ 00:46]    HBsAb 9.5      [04-10-21 @ 23:35]  HBsAb Reactive      [07-29-21 @ 00:09]  HBsAg Nonreact      [07-29-21 @ 00:09]  HBcAb Nonreact      [07-29-21 @ 00:09]  HCV 0.19, Nonreact      [07-29-21 @ 00:09]  HIV Nonreact      [04-11-21 @ 08:31]    RAHUL: titer 1:80, pattern Homogeneous      [05-16-18 @ 22:43]  C3 Complement 100      [01-19-17 @ 20:01]  C4 Complement 32      [01-19-17 @ 20:01]  Rheumatoid Factor <7.0      [01-19-17 @ 20:01]  ANCA: cANCA Negative, pANCA Negative, atypical ANCA Negative      [01-19-17 @ 20:01]  anti-GBM <0.2      [01-20-17 @ 01:52]  ASLO 59      [01-19-17 @ 20:01]  Syphilis Screen (Treponema Pallidum Ab) Negative      [01-19-17 @ 20:01]  Free Light Chains: kappa 12.00, lambda 14.10, ratio = 0.85      [01-23 @ 14:39]  Immunofixation Serum:   No Monoclonal Band Identified      [01-23-17 @ 14:39]  SPEP Interpretation: Polyclonal Gammopathy      [01-23-17 @ 14:39]   [facility] DIVISION OF KIDNEY DISEASES AND HYPERTENSION -- [phone]  -- INITIAL CONSULT NOTE  --------------------------------------------------------------------------------  HPI: 61F with PMH of noncirrhotic portal HTN c/b gastric varices, COVID infection 4/2021, ESRD on HD MWF (chronic GN, last dialyzed Friday), MAK 2 + polycythemia vera 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 Had full session of HD 1d prior. Not on chemo.  Nephrology called for ESRD management.  Pt gets HD MWF (at [facility] dialysis unit Follows with Dr. De León). She reports last HD was on Friday 9/3/21 via L AVF.  She reports she received full treatment on Friday.            Patient seen & examined. Pt complains of fever and, R flank pain & tingling throughout the body since yesterday. Yet makes urine. Denies dysuria, hematuria, pus in urine, frothy urine, SOB, leg edema, loss of appetite, pruritis, N/V.      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    Cirrhosis of liver without ascites, unspecified hepatic cirrhosis type    Pancreatic cyst    Peritoneal dialysis catheter in place  Placed 8/9/2017    Hemoperitoneum as complication of peritoneal dialysis  s/p removal 9/18    AV fistula  Placed 9/18      FAMILY HISTORY:  Family history of hypertension in mother    Family history of malignant neoplasm (Grandparent)  head and neck in father    FH: cirrhosis (Sibling)      PAST SOCIAL HISTORY:    ALLERGIES & MEDICATIONS  --------------------------------------------------------------------------------  Allergies    No Known Allergies    Intolerances      Standing Inpatient Medications  calcium acetate 1334 milliGRAM(s) Oral three times a day with meals  gabapentin 100 milliGRAM(s) Oral at bedtime  Nephro-denona 1 Tablet(s) Oral daily  pantoprazole    Tablet 40 milliGRAM(s) Oral before breakfast  polyethylene glycol 3350 17 Gram(s) Oral daily  propranolol 10 milliGRAM(s) Oral daily  senna 2 Tablet(s) Oral at bedtime  sodium zirconium cyclosilicate 10 Gram(s) Oral three times a day  sucralfate suspension 1 Gram(s) Oral four times a day    PRN Inpatient Medications  melatonin 3 milliGRAM(s) Oral at bedtime PRN      REVIEW OF SYSTEMS  --------------------------------------------------------------------------------  Gen: No  fevers/chills  Respiratory: No dyspnea, cough  CV: No chest pain  GI: No abdominal pain, diarrhea,  nausea, vomiting  : No increased frequency, dysuria, hematuria  MSK:  no edema  Neuro: No dizziness/lightheadedness    All other systems were reviewed and are negative, except as noted.    VITALS/PHYSICAL EXAM  --------------------------------------------------------------------------------  T(C): 36.6 (09-05-21 @ 08:37), Max: 39.4 (09-04-21 @ 22:07)  HR: 67 (09-05-21 @ 08:37) (67 - 101)  BP: 118/73 (09-05-21 @ 08:37) (103/62 - 146/80)  RR: 18 (09-05-21 @ 08:37) (17 - 20)  SpO2: 100% (09-05-21 @ 08:37) (96% - 100%)  Wt(kg): --  Height (cm): 157.5 (09-04-21 @ 22:07)  Weight (kg): 53 (09-04-21 @ 22:07)  BMI (kg/m2): 21.4 (09-04-21 @ 22:07)  BSA (m2): 1.52 (09-04-21 @ 22:07)      Physical Exam:  	Gen: NAD  	HEENT: MMM  	Pulm: CTA B/L  	CV: S1S2  	Abd: Soft, +BS   	Ext: No LE edema B/L, no asterixis  	Neuro: Awake, alert  	Skin: Warm and dry  	Vascular access: LUE AVF with thrill & bruit, with aneurysmal expansion, collaterals on chest noted    LABS/STUDIES  --------------------------------------------------------------------------------              10.0   8.34  >-----------<  168      [09-04-21 @ 23:31]              32.4     134  |  99  |  44  ----------------------------<  143      [09-05-21 @ 03:41]  5.9   |  21  |  5.02        Ca     8.2     [09-05-21 @ 03:41]    TPro  7.0  /  Alb  3.7  /  TBili  3.9  /  DBili  x   /  AST  18  /  ALT  10  /  AlkPhos  114  [09-05-21 @ 03:41]    PT/INR: PT 14.6 , INR 1.23       [09-04-21 @ 23:31]  PTT: 34.5       [09-04-21 @ 23:31]    Uric acid 7.5      [09-04-21 @ 23:31]        [09-04-21 @ 23:31]    Creatinine Trend:  SCr 5.02 [09-05 @ 03:41]  SCr 4.68 [09-04 @ 23:31]  SCr 5.91 [08-13 @ 09:26]  SCr 5.70 [08-13 @ 06:36]  SCr 3.01 [08-12 @ 06:25]    Urinalysis - [01-09-21 @ 12:32]      Color Yellow / Appearance Slightly Turbid / SG 1.021 / pH 8.0      Gluc Trace / Ketone Trace  / Bili Negative / Urobili Negative       Blood Trace / Protein 300 mg/dL / Leuk Est Large / Nitrite Negative      RBC 1 /  / Hyaline 8 / Gran  / Sq Epi  / Non Sq Epi 18 / Bacteria Many      Iron 36, TIBC 152, %sat 23      [08-11-21 @ 09:35]  Ferritin 998      [08-11-21 @ 09:40]  HbA1c 4.9      [06-01-19 @ 00:46]    HBsAb 9.5      [04-10-21 @ 23:35]  HBsAb Reactive      [07-29-21 @ 00:09]  HBsAg Nonreact      [07-29-21 @ 00:09]  HBcAb Nonreact      [07-29-21 @ 00:09]  HCV 0.19, Nonreact      [07-29-21 @ 00:09]  HIV Nonreact      [04-11-21 @ 08:31]    RAHUL: titer 1:80, pattern Homogeneous      [05-16-18 @ 22:43]  C3 Complement 100      [01-19-17 @ 20:01]  C4 Complement 32      [01-19-17 @ 20:01]  Rheumatoid Factor <7.0      [01-19-17 @ 20:01]  ANCA: cANCA Negative, pANCA Negative, atypical ANCA Negative      [01-19-17 @ 20:01]  anti-GBM <0.2      [01-20-17 @ 01:52]  ASLO 59      [01-19-17 @ 20:01]  Syphilis Screen (Treponema Pallidum Ab) Negative      [01-19-17 @ 20:01]  Free Light Chains: kappa 12.00, lambda 14.10, ratio = 0.85      [01-23 @ 14:39]  Immunofixation Serum:   No Monoclonal Band Identified      [01-23-17 @ 14:39]  SPEP Interpretation: Polyclonal Gammopathy      [01-23-17 @ 14:39]

## 2021-09-05 NOTE — CONSULT NOTE ADULT - ATTENDING COMMENTS
Patient seen and examined  Above verified  Agree with above unless as noted below.  61F [place]gy[place]-speaking with PMH of noncirrhotic portal HTN c/b gastric varices, COVID infection 4/2021, ESRD on HD MWF (chronic GN, last dialyzed Friday), JAK2+ Polycthemia vera progressed to secondary myelofibrosis dx 2012 (currently on jafaki), c/b splenomegaly and history of splenic vein thrombosis 2015, p/w fevers and myalgias x1 d.  With fever to 103 and no R lower back tenderness.  No other localization  CXR clear  No leucocytosis but bandemia  ? HD related bacteremia  ? Other occult process  ? Myelofibrosis related fever -also elevated uric acid and LDH  ? Infection related to MAK inhibitor    Rec:  A) fever  follow blood, urine cx  2) CT chest abd pelvis   3) empiric cefepime (renally adjusted) + vanco x 1  4) hem input is the fever/bandemia and levated LDH/uric acid related to transformation/progression of myelofibrosis    Will tailor plan for ID issues  per course,results.Will defer to primary team on management of other issues.  Assessment, plan and recommendations as detailed above were discussed with the medical/primary  team.  Will Follow.  Beeper [phone] JESUS [id number].   Wknd/afterhours/No response-[phone] or Fellow on call

## 2021-09-05 NOTE — CHART NOTE - NSCHARTNOTEFT_GEN_A_CORE
Infectious disease recommends CT chest/abd/pelvis w/ IV contrast be done to eval etiology of Fever. Discussed with Dr. Jackson (Nephrology Fellow) states CT can be done tomorrow prior to HD.     Cindy Reyes, JONEL   [id number]

## 2021-09-05 NOTE — CONSULT NOTE ADULT - SUBJECTIVE AND OBJECTIVE BOX
Patient is a 61y old  Female who presents with a chief complaint of fever    HPI: 61F with PMH of noncirrhotic portal HTN c/b gastric varices, COVID infection 4/2021, ESRD on HD MWF (chronic GN, last dialyzed Friday), MAK 2 + polycythemia vera progressed to secondary myelofibrosis dx 2012, c/b splenomegaly and history of splenic vein thrombosis 2015, p/w fevers and myalgias since this evening. endorses nausea, denies cough, SOB, diarrhea. had full session of HD 1d prior. not on chemo.   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    Cirrhosis of liver without ascites, unspecified hepatic cirrhosis type    Pancreatic cyst    Peritoneal dialysis catheter in place  Placed 8/9/2017    Hemoperitoneum as complication of peritoneal dialysis  s/p removal 9/18    AV fistula  Placed 9/18      Allergies  No Known Allergies    ANTIMICROBIALS (past 90 days)  MEDICATIONS  (STANDING):      ANTIMICROBIALS:      OTHER MEDS: MEDICATIONS  (STANDING):    SOCIAL HISTORY:       FAMILY HISTORY:  Family history of hypertension in mother    Family history of malignant neoplasm (Grandparent)  head and neck in father    FH: cirrhosis (Sibling)      REVIEW OF SYSTEMS  [  ] ROS unobtainable because:    [  ] All other systems negative except as noted below:	    Constitutional:  [ ] fever [ ] chills  [ ] weight loss  [ ] weakness  Skin:  [ ] rash [ ] phlebitis	  Eyes: [ ] icterus [ ] pain  [ ] discharge	  ENMT: [ ] sore throat  [ ] thrush [ ] ulcers [ ] exudates  Respiratory: [ ] dyspnea [ ] hemoptysis [ ] cough [ ] sputum	  Cardiovascular:  [ ] chest pain [ ] palpitations [ ] edema	  Gastrointestinal:  [ ] nausea [ ] vomiting [ ] diarrhea [ ] constipation [ ] pain	  Genitourinary:  [ ] dysuria [ ] frequency [ ] hematuria [ ] discharge [ ] flank pain  [ ] incontinence  Musculoskeletal:  [ ] myalgias [ ] arthralgias [ ] arthritis  [ ] back pain  Neurological:  [ ] headache [ ] seizures  [ ] confusion/altered mental status  Psychiatric:  [ ] anxiety [ ] depression	  Hematology/Lymphatics:  [ ] lymphadenopathy  Endocrine:  [ ] adrenal [ ] thyroid  Allergic/Immunologic:	 [ ] transplant [ ] seasonal    Vital Signs Last 24 Hrs  T(F): 97.8 (09-05-21 @ 08:37), Max: 103 (09-04-21 @ 22:07)  Vital Signs Last 24 Hrs  HR: 67 (09-05-21 @ 08:37) (67 - 101)  BP: 118/73 (09-05-21 @ 08:37) (103/62 - 146/80)  RR: 18 (09-05-21 @ 08:37)  SpO2: 100% (09-05-21 @ 08:37) (96% - 100%)  Wt(kg): --    EXAM:  Constitutional: Not in acute distress  Eyes: pupils bilaterally reactive to light. No icterus.  Oral cavity: Clear, no lesions  Neck: No neck vein distension noted  RS: Chest clear to auscultation bilaterally. No wheeze/rhonchi/crepitations.  CVS: S1, S2 heard. Regular rate and rhythm. No murmurs/rubs/gallops.  Abdomen: Soft. No guarding/rigidity/tenderness.  : No acute abnormalities  Extremities: Warm. No pedal edema  Skin: No lesions noted  Vascular: No evidence of phlebitis  Neuro: Alert, oriented to time/place/person                          10.0   8.34  )-----------( 168      ( 04 Sep 2021 23:31 )             32.4     09-05    134<L>  |  99  |  44<H>  ----------------------------<  143<H>  5.9<H>   |  21<L>  |  5.02<H>    Ca    8.2<L>      05 Sep 2021 03:41    TPro  7.0  /  Alb  3.7  /  TBili  3.9<H>  /  DBili  x   /  AST  18  /  ALT  10  /  AlkPhos  114  09-05    MICROBIOLOGY:  Rapid RVP Result: NotDetec (09-05-21 @ 00:30)      RADIOLOGY:  imaging below personally reviewed    < from: Xray Chest 2 Views PA/Lat (09.05.21 @ 01:46) >  IMPRESSION:    Clear lungs.    < end of copied text >    OTHER TESTS:   Patient is a 61y old  Female who presents with a chief complaint of fever    HPI: 61F Amharic-speaking with PMH of noncirrhotic portal HTN c/b gastric varices, COVID infection 4/2021, ESRD on HD MWF (chronic GN, last dialyzed Friday), JAK2+ Polycthemia vera progressed to secondary myelofibrosis dx 2012 (currently on jafaki), c/b splenomegaly and history of splenic vein thrombosis 2015, p/w fevers and myalgias since this evening. Also with generalized body tingling.  Endorses nausea, denies cough, SOB, diarrhea. had full session of HD 1d prior. Received via AVF on L arm since 2017.    In the ED Tmax 103, Hr 101. No abx given.  Reports she has been receiving Jakafi for 2 months with HD now. Was not hospitalized nor required oxygen for covid in april 2021, and then received vaccine x2, last in June.    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 Born in [place]. No tobacco, drug, ETOH use.     FAMILY HISTORY:  Family history of hypertension in mother    Family history of malignant neoplasm (Grandparent)  head and neck in father    FH: cirrhosis (Sibling)      REVIEW OF SYSTEMS  [  ] ROS unobtainable because:    [ x ] All other systems negative except as noted below:	    Constitutional:  [ x] fever [ ] chills  [ ] weight loss  [x ] weakness  Skin:  [ ] rash [ ] phlebitis	  Eyes: [ ] icterus [ ] pain  [ ] discharge	  ENMT: [ ] sore throat  [ ] thrush [ ] ulcers [ ] exudates  Respiratory: [ ] dyspnea [ ] hemoptysis [ ] cough [ ] sputum	  Cardiovascular:  [ ] chest pain [ ] palpitations [ ] edema	  Gastrointestinal:  [ ] nausea [ ] vomiting [ ] diarrhea [ ] constipation [ ] pain	  Genitourinary:  [ ] dysuria [ ] frequency [ ] hematuria [ ] discharge [ ] flank pain  [ ] incontinence  Musculoskeletal:  [ ] myalgias [ ] arthralgias [ ] arthritis  [ ] back pain  Neurological:  [ ] headache [ ] seizures  [ ] confusion/altered mental status  Psychiatric:  [ ] anxiety [ ] depression	  Hematology/Lymphatics:  [ ] lymphadenopathy  Endocrine:  [ ] adrenal [ ] thyroid  Allergic/Immunologic:	 [ ] transplant [ ] seasonal    Vital Signs Last 24 Hrs  T(F): 97.8 (09-05-21 @ 08:37), Max: 103 (09-04-21 @ 22:07)  Vital Signs Last 24 Hrs  HR: 67 (09-05-21 @ 08:37) (67 - 101)  BP: 118/73 (09-05-21 @ 08:37) (103/62 - 146/80)  RR: 18 (09-05-21 @ 08:37)  SpO2: 100% (09-05-21 @ 08:37) (96% - 100%)  Wt(kg): --    EXAM:  Constitutional: Not in acute distress  Eyes: pupils bilaterally reactive to light. No icterus.  Oral cavity: Clear, no lesions  Neck: No neck vein distension noted  RS: Chest clear to auscultation bilaterally. No wheeze/rhonchi/crepitations.  CVS: S1, S2 heard. Regular rate and rhythm. No murmurs/rubs/gallops.  Abdomen: Soft. No guarding/rigidity/tenderness.  : r lower back tenderness  Extremities: Warm. No pedal edema  Skin: No lesions noted; + LUE AVF with thrill  Vascular: No evidence of phlebitis  Neuro: Alert, oriented to time/place/person                          10.0   8.34  )-----------( 168      ( 04 Sep 2021 23:31 )             32.4     09-05    134<L>  |  99  |  44<H>  ----------------------------<  143<H>  5.9<H>   |  21<L>  |  5.02<H>    Ca    8.2<L>      05 Sep 2021 03:41    TPro  7.0  /  Alb  3.7  /  TBili  3.9<H>  /  DBili  x   /  AST  18  /  ALT  10  /  AlkPhos  114  09-05    MICROBIOLOGY:  Rapid RVP Result: NotDetec (09-05-21 @ 00:30)      RADIOLOGY:  imaging below personally reviewed    < from: Xray Chest 2 Views PA/Lat (09.05.21 @ 01:46) >  IMPRESSION:  Clear lungs.

## 2021-09-06 LAB
COVID-19 SPIKE DOMAIN AB INTERP: POSITIVE
COVID-19 SPIKE DOMAIN ANTIBODY RESULT: >250 U/ML — HIGH
HCT VFR BLD CALC: 25.3 % — LOW (ref 34.5–45)
HGB BLD-MCNC: 7.9 G/DL — LOW (ref 11.5–15.5)
MCHC RBC-ENTMCNC: 28.8 PG — SIGNIFICANT CHANGE UP (ref 27–34)
MCHC RBC-ENTMCNC: 31.2 GM/DL — LOW (ref 32–36)
MCV RBC AUTO: 92.3 FL — SIGNIFICANT CHANGE UP (ref 80–100)
NRBC # BLD: 2 /100 WBCS — HIGH (ref 0–0)
PLATELET # BLD AUTO: 114 K/UL — LOW (ref 150–400)
RBC # BLD: 2.74 M/UL — LOW (ref 3.8–5.2)
RBC # FLD: 20.7 % — HIGH (ref 10.3–14.5)
SARS-COV-2 IGG+IGM SERPL QL IA: >250 U/ML — HIGH
SARS-COV-2 IGG+IGM SERPL QL IA: POSITIVE
VANCOMYCIN FLD-MCNC: 18.9 UG/ML — SIGNIFICANT CHANGE UP
WBC # BLD: 6.48 K/UL — SIGNIFICANT CHANGE UP (ref 3.8–10.5)
WBC # FLD AUTO: 6.48 K/UL — SIGNIFICANT CHANGE UP (ref 3.8–10.5)

## 2021-09-06 PROCEDURE — 99232 SBSQ HOSP IP/OBS MODERATE 35: CPT

## 2021-09-06 PROCEDURE — 71260 CT THORAX DX C+: CPT | Mod: 26

## 2021-09-06 PROCEDURE — 99233 SBSQ HOSP IP/OBS HIGH 50: CPT | Mod: GC

## 2021-09-06 PROCEDURE — 74177 CT ABD & PELVIS W/CONTRAST: CPT | Mod: 26

## 2021-09-06 RX ORDER — ACETAMINOPHEN 500 MG
650 TABLET ORAL ONCE
Refills: 0 | Status: COMPLETED | OUTPATIENT
Start: 2021-09-06 | End: 2021-09-06

## 2021-09-06 RX ADMIN — Medication 1 TABLET(S): at 11:59

## 2021-09-06 RX ADMIN — Medication 650 MILLIGRAM(S): at 22:45

## 2021-09-06 RX ADMIN — SODIUM ZIRCONIUM CYCLOSILICATE 10 GRAM(S): 10 POWDER, FOR SUSPENSION ORAL at 15:01

## 2021-09-06 RX ADMIN — PANTOPRAZOLE SODIUM 40 MILLIGRAM(S): 20 TABLET, DELAYED RELEASE ORAL at 06:29

## 2021-09-06 RX ADMIN — SENNA PLUS 2 TABLET(S): 8.6 TABLET ORAL at 21:27

## 2021-09-06 RX ADMIN — CEFEPIME 100 MILLIGRAM(S): 1 INJECTION, POWDER, FOR SOLUTION INTRAMUSCULAR; INTRAVENOUS at 11:58

## 2021-09-06 RX ADMIN — Medication 1 GRAM(S): at 06:29

## 2021-09-06 RX ADMIN — Medication 650 MILLIGRAM(S): at 21:46

## 2021-09-06 RX ADMIN — SODIUM ZIRCONIUM CYCLOSILICATE 10 GRAM(S): 10 POWDER, FOR SUSPENSION ORAL at 06:28

## 2021-09-06 RX ADMIN — ERYTHROPOIETIN 20000 UNIT(S): 10000 INJECTION, SOLUTION INTRAVENOUS; SUBCUTANEOUS at 18:13

## 2021-09-06 RX ADMIN — Medication 3 MILLIGRAM(S): at 21:27

## 2021-09-06 RX ADMIN — Medication 1 GRAM(S): at 11:59

## 2021-09-06 RX ADMIN — Medication 1334 MILLIGRAM(S): at 08:23

## 2021-09-06 RX ADMIN — POLYETHYLENE GLYCOL 3350 17 GRAM(S): 17 POWDER, FOR SOLUTION ORAL at 08:29

## 2021-09-06 RX ADMIN — Medication 1334 MILLIGRAM(S): at 11:56

## 2021-09-06 NOTE — PROGRESS NOTE ADULT - ATTENDING COMMENTS
#ESKD on HD MWF  a/w fevers, tingling throughout body ?m aches  last hd friday  plan for HD today    #access- left avf-   infiltrated last week and pt states there is an area which has enlarged since last week- she has two areas of shiny, thinned skin which should be avoided during cannulation  please get vascular access ultrasound and call vasc surgery (dr stone/senait) for input; she also has had high k levels recently and this could be a vascular access issue- ie recirculation; check recirc studies next treatment; pt c/o prolonged bleeding post dialysis  #fever workup- check ct a/p for right flank pain, urine cx, blood cx, ?heme issue causing fevers; this is not the first time she has had fevers like this; dose adjust antibiotics per dialysis dosing; call hematology- Dr Jacky Guo for input  #hyperkalemia- low k diet; check recirculation studies; check cpk  #anemia in ckd+myelofibrosis- cont epo 20K tiw with HD  #secondary hyperpth renal origin- on hectorol as outpt with hd  #hyperphosphatemia- cont binders  #c/o tingling/numbess extremities- new within past few mos;

## 2021-09-07 LAB
ALBUMIN SERPL ELPH-MCNC: 4 G/DL — SIGNIFICANT CHANGE UP (ref 3.3–5)
ALP SERPL-CCNC: 115 U/L — SIGNIFICANT CHANGE UP (ref 40–120)
ALT FLD-CCNC: 8 U/L — LOW (ref 10–45)
ANION GAP SERPL CALC-SCNC: 14 MMOL/L — SIGNIFICANT CHANGE UP (ref 5–17)
ANISOCYTOSIS BLD QL: SLIGHT — SIGNIFICANT CHANGE UP
AST SERPL-CCNC: 13 U/L — SIGNIFICANT CHANGE UP (ref 10–40)
BASOPHILS # BLD AUTO: 0 K/UL — SIGNIFICANT CHANGE UP (ref 0–0.2)
BASOPHILS NFR BLD AUTO: 0 % — SIGNIFICANT CHANGE UP (ref 0–2)
BILIRUB SERPL-MCNC: 2.2 MG/DL — HIGH (ref 0.2–1.2)
BUN SERPL-MCNC: 24 MG/DL — HIGH (ref 7–23)
CALCIUM SERPL-MCNC: 9.2 MG/DL — SIGNIFICANT CHANGE UP (ref 8.4–10.5)
CHLORIDE SERPL-SCNC: 96 MMOL/L — SIGNIFICANT CHANGE UP (ref 96–108)
CO2 SERPL-SCNC: 25 MMOL/L — SIGNIFICANT CHANGE UP (ref 22–31)
CREAT SERPL-MCNC: 3.96 MG/DL — HIGH (ref 0.5–1.3)
DACRYOCYTES BLD QL SMEAR: SLIGHT — SIGNIFICANT CHANGE UP
ELLIPTOCYTES BLD QL SMEAR: SLIGHT — SIGNIFICANT CHANGE UP
EOSINOPHIL # BLD AUTO: 0.1 K/UL — SIGNIFICANT CHANGE UP (ref 0–0.5)
EOSINOPHIL NFR BLD AUTO: 1.7 % — SIGNIFICANT CHANGE UP (ref 0–6)
GLUCOSE SERPL-MCNC: 82 MG/DL — SIGNIFICANT CHANGE UP (ref 70–99)
HCT VFR BLD CALC: 27 % — LOW (ref 34.5–45)
HGB BLD-MCNC: 8.4 G/DL — LOW (ref 11.5–15.5)
LYMPHOCYTES # BLD AUTO: 0.36 K/UL — LOW (ref 1–3.3)
LYMPHOCYTES # BLD AUTO: 6.1 % — LOW (ref 13–44)
MANUAL SMEAR VERIFICATION: SIGNIFICANT CHANGE UP
MCHC RBC-ENTMCNC: 28.8 PG — SIGNIFICANT CHANGE UP (ref 27–34)
MCHC RBC-ENTMCNC: 31.1 GM/DL — LOW (ref 32–36)
MCV RBC AUTO: 92.5 FL — SIGNIFICANT CHANGE UP (ref 80–100)
METAMYELOCYTES # FLD: 1.8 % — HIGH (ref 0–0)
MONOCYTES # BLD AUTO: 0.26 K/UL — SIGNIFICANT CHANGE UP (ref 0–0.9)
MONOCYTES NFR BLD AUTO: 4.4 % — SIGNIFICANT CHANGE UP (ref 2–14)
MYELOCYTES NFR BLD: 1.8 % — HIGH (ref 0–0)
NEUTROPHILS # BLD AUTO: 4.96 K/UL — SIGNIFICANT CHANGE UP (ref 1.8–7.4)
NEUTROPHILS NFR BLD AUTO: 81.6 % — HIGH (ref 43–77)
NEUTS BAND # BLD: 2.6 % — SIGNIFICANT CHANGE UP (ref 0–8)
NRBC # BLD: 2 /100 — HIGH (ref 0–0)
PLAT MORPH BLD: NORMAL — SIGNIFICANT CHANGE UP
PLATELET # BLD AUTO: 117 K/UL — LOW (ref 150–400)
POIKILOCYTOSIS BLD QL AUTO: SLIGHT — SIGNIFICANT CHANGE UP
POLYCHROMASIA BLD QL SMEAR: SLIGHT — SIGNIFICANT CHANGE UP
POTASSIUM SERPL-MCNC: 3.6 MMOL/L — SIGNIFICANT CHANGE UP (ref 3.5–5.3)
POTASSIUM SERPL-SCNC: 3.6 MMOL/L — SIGNIFICANT CHANGE UP (ref 3.5–5.3)
PROT SERPL-MCNC: 7.5 G/DL — SIGNIFICANT CHANGE UP (ref 6–8.3)
RBC # BLD: 2.92 M/UL — LOW (ref 3.8–5.2)
RBC # FLD: 21 % — HIGH (ref 10.3–14.5)
RBC BLD AUTO: ABNORMAL
SODIUM SERPL-SCNC: 135 MMOL/L — SIGNIFICANT CHANGE UP (ref 135–145)
WBC # BLD: 5.89 K/UL — SIGNIFICANT CHANGE UP (ref 3.8–10.5)
WBC # FLD AUTO: 5.89 K/UL — SIGNIFICANT CHANGE UP (ref 3.8–10.5)

## 2021-09-07 PROCEDURE — 99233 SBSQ HOSP IP/OBS HIGH 50: CPT | Mod: GC

## 2021-09-07 PROCEDURE — 99222 1ST HOSP IP/OBS MODERATE 55: CPT

## 2021-09-07 PROCEDURE — 99232 SBSQ HOSP IP/OBS MODERATE 35: CPT

## 2021-09-07 RX ADMIN — SENNA PLUS 2 TABLET(S): 8.6 TABLET ORAL at 21:27

## 2021-09-07 RX ADMIN — PANTOPRAZOLE SODIUM 40 MILLIGRAM(S): 20 TABLET, DELAYED RELEASE ORAL at 07:51

## 2021-09-07 RX ADMIN — Medication 1 GRAM(S): at 17:11

## 2021-09-07 RX ADMIN — Medication 1334 MILLIGRAM(S): at 13:27

## 2021-09-07 RX ADMIN — Medication 1334 MILLIGRAM(S): at 07:51

## 2021-09-07 RX ADMIN — Medication 1 GRAM(S): at 13:27

## 2021-09-07 RX ADMIN — POLYETHYLENE GLYCOL 3350 17 GRAM(S): 17 POWDER, FOR SOLUTION ORAL at 13:26

## 2021-09-07 RX ADMIN — Medication 1 GRAM(S): at 00:28

## 2021-09-07 RX ADMIN — Medication 1 TABLET(S): at 13:26

## 2021-09-07 RX ADMIN — Medication 1334 MILLIGRAM(S): at 17:11

## 2021-09-07 RX ADMIN — Medication 3 MILLIGRAM(S): at 21:28

## 2021-09-07 RX ADMIN — Medication 1 GRAM(S): at 21:27

## 2021-09-07 NOTE — BH CONSULTATION LIAISON ASSESSMENT NOTE - NSBHCHARTREVIEWINVESTIGATE_PSY_A_CORE FT
e< from: 12 Lead ECG (09.04.21 @ 22:41) >      Ventricular Rate 88 BPM    Atrial Rate 88 BPM    P-R Interval 140 ms    QRS Duration 74 ms    Q-T Interval 354 ms    QTC Calculation(Bazett) 428 ms    P Axis 35 degrees    R Axis 67 degrees    T Axis 46 degrees    Diagnosis Line NORMAL SINUS RHYTHM  NORMAL ECG  WHEN COMPARED WITH ECG OF 10-AUG-2021 08:59,  NO SIGNIFICANT CHANGE WAS FOUND  Confirmed by JUAN MANUEL MARC, PIPER ([id number]) on 9/6/2021 11:27:30 AM    < end of copied text >

## 2021-09-07 NOTE — BH CONSULTATION LIAISON ASSESSMENT NOTE - RISK ASSESSMENT
Risk factors: acute/chronic medical issues, chronic pain, not receiving treatment    Protective factors: no current SIIP/HIIP, no h/o SA/SIB, no h/o psych admissions, no active substance abuse, no psychosis, with adult children, domiciled, intact marriage, social supports, help-seeking behaviors    Overall, pt is a low risk of harm to self/others and does not require psychiatric admission for safety and stabilization.

## 2021-09-07 NOTE — CONSULT NOTE ADULT - ATTENDING COMMENTS
Patient with myeloproliferation/ myelodysplasia on Ruxolitinib treatment admitted for evaluation of fever. I agree with plan of care for evaluation of fever

## 2021-09-07 NOTE — BH CONSULTATION LIAISON ASSESSMENT NOTE - CURRENT MEDICATION
MEDICATIONS  (STANDING):  calcium acetate 1334 milliGRAM(s) Oral three times a day with meals  epoetin filiberto-epbx (RETACRIT) Injectable 80799 Unit(s) IV Push <User Schedule>  influenza   Vaccine 0.5 milliLiter(s) IntraMuscular once  Nephro-deonna 1 Tablet(s) Oral daily  pantoprazole    Tablet 40 milliGRAM(s) Oral before breakfast  polyethylene glycol 3350 17 Gram(s) Oral daily  propranolol 10 milliGRAM(s) Oral daily  senna 2 Tablet(s) Oral at bedtime  sucralfate suspension 1 Gram(s) Oral four times a day    MEDICATIONS  (PRN):  gabapentin 100 milliGRAM(s) Oral at bedtime PRN Numbness/Tingling  melatonin 3 milliGRAM(s) Oral at bedtime PRN Insomnia

## 2021-09-07 NOTE — BH CONSULTATION LIAISON ASSESSMENT NOTE - DESCRIPTION
, 3 daughter (youngest daughter living with patient, 2 other daughter  and live separately), disabled for the past 10 years due to medical issues- previously worked in a nail salon, lives with her spouse, daughter and mother in law in [place]

## 2021-09-07 NOTE — BH CONSULTATION LIAISON ASSESSMENT NOTE - CASE SUMMARY
Pt is a 62y/o  women, with 3 daughters (youngest daughter lives with patient), disabled due to medical issues, previously worked in a nail salon, lives with spouse, daughter and mother in law in [place] with no formal PPHx, no h/o SA/SIB, no h/o substance abuse, with PMH of noncirrhotic portal HTN c/b gastric varices, COVID infection 4/2021, ESRD on HD MWF (chronic GN, last dialyzed Friday), MAK 2 + polycythemia vera progressed to secondary myelofibrosis dx 2012, c/b splenomegaly and history of splenic vein thrombosis 2015, p/w fevers and myalgias.  Psychiatry consulted to assess for depression.  Seen and evaluated, awake and alert, oriented, states has been depressed, sad in the context of her medical issues.  Reports at times blaming her self for her illnesses, tearful intermittently during interview.  She reports ability to care for self at home, cooking and cleaning.  States due to cultural beliefs has never sought treatment, counseling, or never discussed feelings with her family.  She denies SI/HI, AVH, or thoughts of paranoia.  Offered medications to help with her mood, however declined.  Discussed benefits of seeking outpt therapy, counseling, discussed virtual options and clinic that offers services in her native language.  At this time, patient does not meet criteria for inpt psychiatric hospitalization.  Emotional support and counseling provided.  Please have SW provide outpt psychiatric referrals in patient's area.  Patient may f/u at the [facility] walk-in clinic [phone], or at the [phone] in [facility] where there are therapists who are fluent in Czech.

## 2021-09-07 NOTE — BH CONSULTATION LIAISON ASSESSMENT NOTE - NSBHCHARTREVIEWVS_PSY_A_CORE FT
Vital Signs Last 24 Hrs  T(C): 36.9 (07 Sep 2021 08:39), Max: 36.9 (07 Sep 2021 08:39)  T(F): 98.5 (07 Sep 2021 08:39), Max: 98.5 (07 Sep 2021 08:39)  HR: 80 (07 Sep 2021 08:39) (76 - 100)  BP: 118/71 (07 Sep 2021 08:39) (92/60 - 131/76)  BP(mean): --  RR: 18 (07 Sep 2021 08:39) (16 - 18)  SpO2: 96% (07 Sep 2021 08:39) (96% - 100%)

## 2021-09-07 NOTE — CONSULT NOTE ADULT - ASSESSMENT
61F PMH of noncirrhotic portal HTN c/b gastric varices, COVID infection 4/2021, ESRD on HD MWF (chronic GN, last dialyzed Friday), JAK2+ Polycthemia vera progressed to secondary myelofibrosis dx 2012 (currently on Jakafi), c/b splenomegaly and history of splenic vein thrombosis 2015, p/w fevers and myalgias x1 d. Noted to have fever to 103F. Hematology consulted due to hx of myelofibrosis, on treatment.    #Myelofibrosis  #Normocytic anemia  -patient on treatment with Jakafi  -would recommend restarting it to avoid Jakafi discontinuation syndrome (can appear similar to sepsis / cytokine release syndrome). Do not suspect that patient's febrile episode from admission is related to not taking Jakafi, as she only takes it three times per week. Discussed with patient that family should bring medication from home and it should be labeled / verified by the pharmacy while she is here; home dose is 10 mg on days she gets HD  -anemic (stable) and thrombocytopenia; suspect 2/2 patients use of Jakafi  -would check ferritin, iron w/ TIBC  -check reticulocyte count  -In setting of whole body paresthesias please check B12, folate, methylmalonic acid, homocysteine  -Check serum copper level  -Check haptoglobin and LDH  -Given fevers and paresthesias, ?viral syndrome; would consider neurology evaluation; patient currently denies any headaches or meningismus. Discussed with primary team    Please call with any questions.    Manpreet Mehta MD, MPH  Hematology / Oncology Fellow, PGY7  p  61F PMH of noncirrhotic portal HTN c/b gastric varices, COVID infection 4/2021, ESRD on HD MWF (chronic GN, last dialyzed Friday), JAK2+ Polycthemia vera progressed to secondary myelofibrosis dx 2012 (currently on Jakafi), c/b splenomegaly and history of splenic vein thrombosis 2015, p/w fevers and myalgias x1 d. Noted to have fever to 103F. Hematology consulted due to hx of myelofibrosis, on treatment.    #Myelofibrosis  #Normocytic anemia  -patient on treatment with Jakafi  -would recommend restarting it to avoid Jakafi discontinuation syndrome (can appear similar to sepsis / cytokine release syndrome). Do not suspect that patient's febrile episode from admission is related to not taking Jakafi, as she only takes it three times per week. Discussed with patient that family should bring medication from home and it should be labeled / verified by the pharmacy while she is here; home dose is 10 mg on days she gets HD  -anemic (stable) and thrombocytopenia; suspect 2/2 patients use of Jakafi  -would check ferritin, iron w/ TIBC  -check reticulocyte count  -In setting of whole body paresthesias please check B12, folate, methylmalonic acid, homocysteine  -Check serum copper level  -Check haptoglobin and LDH  -Given fevers and paresthesias, raises suspicion for inflammatory disorder (?viral syndrome vs ?rheumatological disorder); would consider neurology evaluation; patient currently denies any headaches or meningismus. Discussed with primary team    Please call with any questions.    Manpreet Mehta MD, MPH  Hematology / Oncology Fellow, PGY7  p

## 2021-09-07 NOTE — BH CONSULTATION LIAISON ASSESSMENT NOTE - HPI (INCLUDE ILLNESS QUALITY, SEVERITY, DURATION, TIMING, CONTEXT, MODIFYING FACTORS, ASSOCIATED SIGNS AND SYMPTOMS)
62y/o  female, with 3 daughters (youngest daughter lives with patient), disabled due to medical issues, previously worked in a nail salon, lives with spouse, daughter and mother in law in [place] with no formal PPHx, no h/o SA/SIB, no h/o substance abuse, with PMH of noncirrhotic portal HTN c/b gastric varices, COVID infection 4/2021, ESRD on HD MWF (chronic GN, last dialyzed Friday), MAK 2 + polycythemia vera progressed to secondary myelofibrosis dx 2012, c/b splenomegaly and history of splenic vein thrombosis 2015, p/w fevers and myalgias.  Psychiatry consulted to assess for depression.    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  Psychiatry consulted to assess for depression.    Patient seen and evaluated, awake and alert, able to state recent events.  Reports feeling saddened about her medical issues, states had been diagnosed with polycythemia 10 years ago, had to undergo multiple transfusions.  Reports has not been able to work since she was diagnosed with this 10 years ago.  Reports that 4-5 years ago began having renal dysfunction, had to start hemodialysis treatments.  She reports recently has been feeling sad over her medical issues, questioning why she has all of her medical issues, questions whether if anything that she had done in the past consequently led to her all of her current medical issues, at times blames herself.  She reports she has never spoken to anyone about how she has been feeling emotionally, states frowned upon in her culture.  She reports she would feel worse if she told her family and her family will tell her "your situation is not as bad as some other", essentially invalidating her feelings.  She denies that she has ever sought counseling or psychiatric treatment.  She denies illicit substance or alcohol abuse.  She denies sleep disturbances, states has some difficulties on the days that she undergoes HD, however otherwise has no issues.  She denies changes in her appetite, reports some mild weight gain approx 2 kg.  She denies SI/HI, AVH, or thoughts of paranoia, does not feel as if people are trying to harm her.  Offered medications for her mood, however patient declining.  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  Psychiatry consulted to assess for depression.    Patient seen and evaluated, awake and alert, able to state recent events.  Reports feeling saddened about her medical issues,  had been diagnosed with polycythemia 10 years ago, had to undergo multiple transfusions.  Courtney has not been able to work since she was diagnosed with this 10 years ago.  Reports that 4-5 years ago began having renal dysfunction, had to start hemodialysis treatments.  She reports recently has been feeling sad over her medical issues, questioning why she has all of her medical issues, questions whether if anything that she had done in the past consequently led to her all of her current medical issues, at times blames herself.   also has a sibling in [place] who is waiting to hear about results to r/o cancer.  She reports she has never spoken to anyone about how she has been feeling emotionally, states frowned upon in her culture.  She reports she would feel worse if she told her family and her family will tell her "your situation is not as bad as some other", essentially invalidating her feelings.  She denies that she has ever sought counseling or psychiatric treatment.  She denies illicit substance or alcohol abuse.  She denies sleep disturbances,  has some difficulties on the days that she undergoes HD, however otherwise has no issues.  She denies changes in her appetite, reports some mild weight gain approx 2 kg.  She denies SI/HI, AVH, or thoughts of paranoia, does not feel as if people are trying to harm her.  Offered medications for her mood, however patient declining.  Also discussed with her about importance of seeking counseling, therapy as an outpt, reports some reluctance, reports "I have my  and my mother in law at home."

## 2021-09-07 NOTE — BH CONSULTATION LIAISON ASSESSMENT NOTE - NSBHCHARTREVIEWLAB_PSY_A_CORE FT
8.4    5.89  )-----------( 117      ( 07 Sep 2021 07:08 )             27.0     09-07    135  |  96  |  24<H>  ----------------------------<  82  3.6   |  25  |  3.96<H>    Ca    9.2      07 Sep 2021 07:09    TPro  7.5  /  Alb  4.0  /  TBili  2.2<H>  /  DBili  x   /  AST  13  /  ALT  8<L>  /  AlkPhos  115  09-07

## 2021-09-07 NOTE — BH CONSULTATION LIAISON ASSESSMENT NOTE - SUMMARY
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  Psychiatry consulted to assess for depression.   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  Psychiatry consulted to assess for depression.  Seen and evaluated, awake and alert, oriented, states has been depressed, sad in the context of her medical issues.  Reports at times blaming her self for her illnesses, tearful intermittently during interview.  She reports ability to care for self at home, cooking and cleaning.  States due to cultural beliefs has never sought treatment, counseling, or never discussed feelings with her family.  She denies SI/HI, AVH, or thoughts of paranoia.  Offered medications to help with her mood, however declined.  Discussed benefits of seeking outpt therapy, counseling, discussed virtual options and clinic that offers services in her native language.  At this time, patient does not meet criteria for inpt psychiatric hospitalization.  Emotional support and counseling provided.  Please have SW provide outpt psychiatric referrals in patient's area.  Patient may f/u OP [facility] walk-in clinic [phone], [facility] [phone].

## 2021-09-07 NOTE — CONSULT NOTE ADULT - SUBJECTIVE AND OBJECTIVE BOX
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 Endorses nausea, denies cough, SOB, diarrhea. Had full session of HD 1d prior. She is on treatment with Jakafi, which she takes on days when she gets HD (MWF); she last took it on Friday.    Hematology consulted given her previous hx of myelofibrosis (JAK2+) on treatement; she follows with Dr. Jacky Guo at [facility]. She reports that she now has numbness and tingling through, feels it all over.    Brief oncological Hx:  History of MAK 2 + polycythemia vera, initially diagnosed in 2012. 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 Hollie Guzmán in [place] (# [phone]). She has had a bone marrow biopsy done at the time of diagnosis. Was previously non complaint with therapy there, however has been quite complaint with the hydroxyurea since coming to [facility] fellows clinic here in 2017. Patient now follows with Dr. Jacky Guo after closure of the fellows clinic.   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  She was admitted to [facility] from July 3-5, 2018 due to hyperkalemia and thrombosis of her AVF. Hematology was consulted and recommended phlebotomy, but patient refused. She was discharged on Hydrea 500mg on HD days, which she is still on. As of 9/27/19 she has transferred from the hematology fellows clinic to Dr. Guo's service at [facility].     She was previously treated with HU in the setting of abdominal pain 2/2 splenomegaly w/ splenic infarcts; Hgb came up with treatment and pain improved. After a month of Jakafi 15 mg following HD three times a week, patient abdominal pain and LUQ pain resolved, but she became weak and anemic, requiring transfusion at the hospital. Patient now discharged, though feeling weak.  She is currently on Jakafi 10 mg on days she gets HD three times a week.    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  Cirrhosis of liver without ascites, unspecified hepatic cirrhosis type  Pancreatic cyst  Peritoneal dialysis catheter in place  Placed 8/9/2017  Hemoperitoneum as complication of peritoneal dialysis  s/p removal 9/18  AV fistula  Placed 9/18    FAMILY HISTORY:  Family history of hypertension in mother    Family history of malignant neoplasm (Grandparent)  head and neck in father    FH: cirrhosis (Sibling)      MEDICATIONS  (STANDING):  calcium acetate 1334 milliGRAM(s) Oral three times a day with meals  epoetin filiberto-epbx (RETACRIT) Injectable 89141 Unit(s) IV Push <User Schedule>  influenza   Vaccine 0.5 milliLiter(s) IntraMuscular once  Nephro-deonna 1 Tablet(s) Oral daily  pantoprazole    Tablet 40 milliGRAM(s) Oral before breakfast  polyethylene glycol 3350 17 Gram(s) Oral daily  propranolol 10 milliGRAM(s) Oral daily  senna 2 Tablet(s) Oral at bedtime  sucralfate suspension 1 Gram(s) Oral four times a day    MEDICATIONS  (PRN):  gabapentin 100 milliGRAM(s) Oral at bedtime PRN Numbness/Tingling  melatonin 3 milliGRAM(s) Oral at bedtime PRN Insomnia      Allergies: No Known Allergies / Intolerances    SOCIAL HISTORY: No EtOH, no tobacco    REVIEW OF SYSTEMS:    CONSTITUTIONAL: No weakness, fevers or chills  EYES/ENT: No visual changes;  No vertigo. Endorses some throat discomfort, though not pain; describes it as "fullness you feel after running."  NECK: No pain or stiffness  RESPIRATORY: No cough, wheezing, hemoptysis; No shortness of breath  CARDIOVASCULAR: No chest pain or palpitations  GASTROINTESTINAL: +Nausea, but no vomiting. No abdominal or epigastric pain. No diarrhea or constipation. No melena or hematochezia.  GENITOURINARY: No dysuria, frequency or hematuria  NEUROLOGICAL: No numbness or weakness; +numbness and tingling throughout body, all new  SKIN: No itching, burning, rashes, or lesions   All other review of systems is negative unless indicated above.    T(F): 98.9 (09-07-21 @ 15:49), Max: 98.9 (09-07-21 @ 15:49)  HR: 80 (09-07-21 @ 15:49)  BP: 123/78 (09-07-21 @ 15:49)  RR: 18 (09-07-21 @ 15:49)  SpO2: 98% (09-07-21 @ 15:49)  Wt(kg): --    GENERAL: NAD, well-developed  HEAD:  Atraumatic, Normocephalic  EYES: EOMI, PERRLA, conjunctiva and sclera clear  NECK: Supple, No JVD  CHEST/LUNG: Clear to auscultation bilaterally; No wheeze  HEART: Regular rate and rhythm; No murmurs, rubs, or gallops  ABDOMEN: Soft, Nontender, Nondistended; Bowel sounds present  EXTREMITIES:  2+ Peripheral Pulses, No clubbing, cyanosis, or edema  NEUROLOGY: non-focal  SKIN: No rashes or lesions                          8.4    5.89  )-----------( 117      ( 07 Sep 2021 07:08 )             27.0       09-07    135  |  96  |  24<H>  ----------------------------<  82  3.6   |  25  |  3.96<H>    Ca    9.2      07 Sep 2021 07:09    TPro  7.5  /  Alb  4.0  /  TBili  2.2<H>  /  DBili  x   /  AST  13  /  ALT  8<L>  /  AlkPhos  115  09-07

## 2021-09-07 NOTE — CONSULT NOTE ADULT - REASON FOR ADMISSION
The patient is a 61y Female complaining of fever.
The patient is a 61y Female complaining of fever.
fever

## 2021-09-08 ENCOUNTER — NON-APPOINTMENT (OUTPATIENT)
Age: 61
End: 2021-09-08

## 2021-09-08 LAB
ALBUMIN SERPL ELPH-MCNC: 4 G/DL — SIGNIFICANT CHANGE UP (ref 3.3–5)
ALP SERPL-CCNC: 109 U/L — SIGNIFICANT CHANGE UP (ref 40–120)
ALT FLD-CCNC: 8 U/L — LOW (ref 10–45)
ANION GAP SERPL CALC-SCNC: 16 MMOL/L — SIGNIFICANT CHANGE UP (ref 5–17)
AST SERPL-CCNC: 14 U/L — SIGNIFICANT CHANGE UP (ref 10–40)
BASOPHILS # BLD AUTO: 0.11 K/UL — SIGNIFICANT CHANGE UP (ref 0–0.2)
BASOPHILS NFR BLD AUTO: 1.8 % — SIGNIFICANT CHANGE UP (ref 0–2)
BILIRUB SERPL-MCNC: 2 MG/DL — HIGH (ref 0.2–1.2)
BUN SERPL-MCNC: 33 MG/DL — HIGH (ref 7–23)
CALCIUM SERPL-MCNC: 9.3 MG/DL — SIGNIFICANT CHANGE UP (ref 8.4–10.5)
CHLORIDE SERPL-SCNC: 96 MMOL/L — SIGNIFICANT CHANGE UP (ref 96–108)
CO2 SERPL-SCNC: 21 MMOL/L — LOW (ref 22–31)
CREAT SERPL-MCNC: 5.39 MG/DL — HIGH (ref 0.5–1.3)
EOSINOPHIL # BLD AUTO: 0.11 K/UL — SIGNIFICANT CHANGE UP (ref 0–0.5)
EOSINOPHIL NFR BLD AUTO: 1.8 % — SIGNIFICANT CHANGE UP (ref 0–6)
FOLATE SERPL-MCNC: >20 NG/ML — SIGNIFICANT CHANGE UP
GLUCOSE SERPL-MCNC: 123 MG/DL — HIGH (ref 70–99)
HAPTOGLOB SERPL-MCNC: <20 MG/DL — LOW (ref 34–200)
HCT VFR BLD CALC: 26.3 % — LOW (ref 34.5–45)
HGB BLD-MCNC: 8 G/DL — LOW (ref 11.5–15.5)
LDH SERPL L TO P-CCNC: 373 U/L — HIGH (ref 50–242)
LYMPHOCYTES # BLD AUTO: 0.94 K/UL — LOW (ref 1–3.3)
LYMPHOCYTES # BLD AUTO: 15 % — SIGNIFICANT CHANGE UP (ref 13–44)
MANUAL SMEAR VERIFICATION: SIGNIFICANT CHANGE UP
MCHC RBC-ENTMCNC: 28.6 PG — SIGNIFICANT CHANGE UP (ref 27–34)
MCHC RBC-ENTMCNC: 30.4 GM/DL — LOW (ref 32–36)
MCV RBC AUTO: 93.9 FL — SIGNIFICANT CHANGE UP (ref 80–100)
MONOCYTES # BLD AUTO: 0.28 K/UL — SIGNIFICANT CHANGE UP (ref 0–0.9)
MONOCYTES NFR BLD AUTO: 4.4 % — SIGNIFICANT CHANGE UP (ref 2–14)
MYELOCYTES NFR BLD: 1.8 % — HIGH (ref 0–0)
NEUTROPHILS # BLD AUTO: 4.72 K/UL — SIGNIFICANT CHANGE UP (ref 1.8–7.4)
NEUTROPHILS NFR BLD AUTO: 68.1 % — SIGNIFICANT CHANGE UP (ref 43–77)
NEUTS BAND # BLD: 7.1 % — SIGNIFICANT CHANGE UP (ref 0–8)
PLAT MORPH BLD: NORMAL — SIGNIFICANT CHANGE UP
PLATELET # BLD AUTO: 116 K/UL — LOW (ref 150–400)
POTASSIUM SERPL-MCNC: 4.2 MMOL/L — SIGNIFICANT CHANGE UP (ref 3.5–5.3)
POTASSIUM SERPL-SCNC: 4.2 MMOL/L — SIGNIFICANT CHANGE UP (ref 3.5–5.3)
PROT SERPL-MCNC: 7.3 G/DL — SIGNIFICANT CHANGE UP (ref 6–8.3)
RBC # BLD: 2.8 M/UL — LOW (ref 3.8–5.2)
RBC # FLD: 21 % — HIGH (ref 10.3–14.5)
RBC BLD AUTO: SIGNIFICANT CHANGE UP
SODIUM SERPL-SCNC: 133 MMOL/L — LOW (ref 135–145)
T PALLIDUM AB TITR SER: NEGATIVE — SIGNIFICANT CHANGE UP
TSH SERPL-MCNC: 1.78 UIU/ML — SIGNIFICANT CHANGE UP (ref 0.27–4.2)
VIT B12 SERPL-MCNC: 1396 PG/ML — HIGH (ref 232–1245)
VIT D25+D1,25 OH+D1,25 PNL SERPL-MCNC: 6.6 PG/ML — LOW (ref 19.9–79.3)
WBC # BLD: 6.28 K/UL — SIGNIFICANT CHANGE UP (ref 3.8–10.5)
WBC # FLD AUTO: 6.28 K/UL — SIGNIFICANT CHANGE UP (ref 3.8–10.5)

## 2021-09-08 PROCEDURE — 90935 HEMODIALYSIS ONE EVALUATION: CPT | Mod: GC

## 2021-09-08 PROCEDURE — 72100 X-RAY EXAM L-S SPINE 2/3 VWS: CPT | Mod: 26

## 2021-09-08 PROCEDURE — 99231 SBSQ HOSP IP/OBS SF/LOW 25: CPT

## 2021-09-08 RX ORDER — ONDANSETRON 8 MG/1
4 TABLET, FILM COATED ORAL ONCE
Refills: 0 | Status: COMPLETED | OUTPATIENT
Start: 2021-09-08 | End: 2021-09-08

## 2021-09-08 RX ADMIN — PANTOPRAZOLE SODIUM 40 MILLIGRAM(S): 20 TABLET, DELAYED RELEASE ORAL at 17:14

## 2021-09-08 RX ADMIN — POLYETHYLENE GLYCOL 3350 17 GRAM(S): 17 POWDER, FOR SOLUTION ORAL at 14:42

## 2021-09-08 RX ADMIN — GABAPENTIN 100 MILLIGRAM(S): 400 CAPSULE ORAL at 21:51

## 2021-09-08 RX ADMIN — ONDANSETRON 4 MILLIGRAM(S): 8 TABLET, FILM COATED ORAL at 12:16

## 2021-09-08 RX ADMIN — Medication 1334 MILLIGRAM(S): at 18:41

## 2021-09-08 RX ADMIN — ERYTHROPOIETIN 20000 UNIT(S): 10000 INJECTION, SOLUTION INTRAVENOUS; SUBCUTANEOUS at 10:30

## 2021-09-08 RX ADMIN — Medication 1 TABLET(S): at 14:41

## 2021-09-08 RX ADMIN — Medication 1334 MILLIGRAM(S): at 14:42

## 2021-09-08 RX ADMIN — Medication 3 MILLIGRAM(S): at 21:50

## 2021-09-08 RX ADMIN — SENNA PLUS 2 TABLET(S): 8.6 TABLET ORAL at 21:50

## 2021-09-09 LAB
ANION GAP SERPL CALC-SCNC: 14 MMOL/L — SIGNIFICANT CHANGE UP (ref 5–17)
BUN SERPL-MCNC: 20 MG/DL — SIGNIFICANT CHANGE UP (ref 7–23)
CALCIUM SERPL-MCNC: 9.4 MG/DL — SIGNIFICANT CHANGE UP (ref 8.4–10.5)
CHLORIDE SERPL-SCNC: 95 MMOL/L — LOW (ref 96–108)
CO2 SERPL-SCNC: 25 MMOL/L — SIGNIFICANT CHANGE UP (ref 22–31)
CREAT SERPL-MCNC: 3.95 MG/DL — HIGH (ref 0.5–1.3)
GLUCOSE SERPL-MCNC: 92 MG/DL — SIGNIFICANT CHANGE UP (ref 70–99)
HCT VFR BLD CALC: 26.7 % — LOW (ref 34.5–45)
HGB BLD-MCNC: 8.2 G/DL — LOW (ref 11.5–15.5)
MCHC RBC-ENTMCNC: 29.1 PG — SIGNIFICANT CHANGE UP (ref 27–34)
MCHC RBC-ENTMCNC: 30.7 GM/DL — LOW (ref 32–36)
MCV RBC AUTO: 94.7 FL — SIGNIFICANT CHANGE UP (ref 80–100)
NRBC # BLD: 3 /100 WBCS — HIGH (ref 0–0)
PLATELET # BLD AUTO: 126 K/UL — LOW (ref 150–400)
POTASSIUM SERPL-MCNC: 4.5 MMOL/L — SIGNIFICANT CHANGE UP (ref 3.5–5.3)
POTASSIUM SERPL-SCNC: 4.5 MMOL/L — SIGNIFICANT CHANGE UP (ref 3.5–5.3)
RBC # BLD: 2.82 M/UL — LOW (ref 3.8–5.2)
RBC # FLD: 21.2 % — HIGH (ref 10.3–14.5)
SODIUM SERPL-SCNC: 134 MMOL/L — LOW (ref 135–145)
WBC # BLD: 5.4 K/UL — SIGNIFICANT CHANGE UP (ref 3.8–10.5)
WBC # FLD AUTO: 5.4 K/UL — SIGNIFICANT CHANGE UP (ref 3.8–10.5)

## 2021-09-09 PROCEDURE — 99232 SBSQ HOSP IP/OBS MODERATE 35: CPT

## 2021-09-09 PROCEDURE — 93990 DOPPLER FLOW TESTING: CPT | Mod: 26

## 2021-09-09 RX ORDER — RUXOLITINIB 25 MG/1
10 TABLET ORAL
Refills: 0 | Status: DISCONTINUED | OUTPATIENT
Start: 2021-09-10 | End: 2021-09-10

## 2021-09-09 RX ADMIN — Medication 1 GRAM(S): at 06:09

## 2021-09-09 RX ADMIN — SENNA PLUS 2 TABLET(S): 8.6 TABLET ORAL at 21:19

## 2021-09-09 RX ADMIN — Medication 3 MILLIGRAM(S): at 21:20

## 2021-09-09 RX ADMIN — Medication 1334 MILLIGRAM(S): at 12:37

## 2021-09-09 RX ADMIN — Medication 1 GRAM(S): at 21:19

## 2021-09-09 RX ADMIN — Medication 1334 MILLIGRAM(S): at 08:22

## 2021-09-09 RX ADMIN — PANTOPRAZOLE SODIUM 40 MILLIGRAM(S): 20 TABLET, DELAYED RELEASE ORAL at 06:13

## 2021-09-09 RX ADMIN — Medication 1 TABLET(S): at 12:37

## 2021-09-09 RX ADMIN — Medication 1334 MILLIGRAM(S): at 17:14

## 2021-09-09 RX ADMIN — GABAPENTIN 100 MILLIGRAM(S): 400 CAPSULE ORAL at 06:09

## 2021-09-10 ENCOUNTER — TRANSCRIPTION ENCOUNTER (OUTPATIENT)
Age: 61
End: 2021-09-10

## 2021-09-10 VITALS
RESPIRATION RATE: 18 BRPM | HEART RATE: 84 BPM | SYSTOLIC BLOOD PRESSURE: 114 MMHG | OXYGEN SATURATION: 98 % | TEMPERATURE: 98 F | DIASTOLIC BLOOD PRESSURE: 71 MMHG

## 2021-09-10 DIAGNOSIS — E87.5 HYPERKALEMIA: ICD-10-CM

## 2021-09-10 LAB
ANION GAP SERPL CALC-SCNC: 14 MMOL/L — SIGNIFICANT CHANGE UP (ref 5–17)
BUN SERPL-MCNC: 42 MG/DL — HIGH (ref 7–23)
CALCIUM SERPL-MCNC: 9.2 MG/DL — SIGNIFICANT CHANGE UP (ref 8.4–10.5)
CHLORIDE SERPL-SCNC: 99 MMOL/L — SIGNIFICANT CHANGE UP (ref 96–108)
CO2 SERPL-SCNC: 22 MMOL/L — SIGNIFICANT CHANGE UP (ref 22–31)
CREAT SERPL-MCNC: 5.81 MG/DL — HIGH (ref 0.5–1.3)
CULTURE RESULTS: SIGNIFICANT CHANGE UP
CULTURE RESULTS: SIGNIFICANT CHANGE UP
GLUCOSE SERPL-MCNC: 97 MG/DL — SIGNIFICANT CHANGE UP (ref 70–99)
HCT VFR BLD CALC: 27.6 % — LOW (ref 34.5–45)
HGB BLD-MCNC: 8.4 G/DL — LOW (ref 11.5–15.5)
MAGNESIUM SERPL-MCNC: 2.7 MG/DL — HIGH (ref 1.6–2.6)
MCHC RBC-ENTMCNC: 28.8 PG — SIGNIFICANT CHANGE UP (ref 27–34)
MCHC RBC-ENTMCNC: 30.4 GM/DL — LOW (ref 32–36)
MCV RBC AUTO: 94.5 FL — SIGNIFICANT CHANGE UP (ref 80–100)
NRBC # BLD: 3 /100 WBCS — HIGH (ref 0–0)
PHOSPHATE SERPL-MCNC: 2.9 MG/DL — SIGNIFICANT CHANGE UP (ref 2.5–4.5)
PLATELET # BLD AUTO: 127 K/UL — LOW (ref 150–400)
POTASSIUM SERPL-MCNC: 5 MMOL/L — SIGNIFICANT CHANGE UP (ref 3.5–5.3)
POTASSIUM SERPL-SCNC: 5 MMOL/L — SIGNIFICANT CHANGE UP (ref 3.5–5.3)
RBC # BLD: 2.92 M/UL — LOW (ref 3.8–5.2)
RBC # FLD: 21.1 % — HIGH (ref 10.3–14.5)
SODIUM SERPL-SCNC: 135 MMOL/L — SIGNIFICANT CHANGE UP (ref 135–145)
SPECIMEN SOURCE: SIGNIFICANT CHANGE UP
SPECIMEN SOURCE: SIGNIFICANT CHANGE UP
WBC # BLD: 6.45 K/UL — SIGNIFICANT CHANGE UP (ref 3.8–10.5)
WBC # FLD AUTO: 6.45 K/UL — SIGNIFICANT CHANGE UP (ref 3.8–10.5)

## 2021-09-10 PROCEDURE — 83605 ASSAY OF LACTIC ACID: CPT

## 2021-09-10 PROCEDURE — 84295 ASSAY OF SERUM SODIUM: CPT

## 2021-09-10 PROCEDURE — 71260 CT THORAX DX C+: CPT

## 2021-09-10 PROCEDURE — 71046 X-RAY EXAM CHEST 2 VIEWS: CPT

## 2021-09-10 PROCEDURE — 99232 SBSQ HOSP IP/OBS MODERATE 35: CPT

## 2021-09-10 PROCEDURE — 82435 ASSAY OF BLOOD CHLORIDE: CPT

## 2021-09-10 PROCEDURE — 85018 HEMOGLOBIN: CPT

## 2021-09-10 PROCEDURE — 96374 THER/PROPH/DIAG INJ IV PUSH: CPT

## 2021-09-10 PROCEDURE — 84443 ASSAY THYROID STIM HORMONE: CPT

## 2021-09-10 PROCEDURE — 93990 DOPPLER FLOW TESTING: CPT

## 2021-09-10 PROCEDURE — 83615 LACTATE (LD) (LDH) ENZYME: CPT

## 2021-09-10 PROCEDURE — 85014 HEMATOCRIT: CPT

## 2021-09-10 PROCEDURE — 83010 ASSAY OF HAPTOGLOBIN QUANT: CPT

## 2021-09-10 PROCEDURE — 82525 ASSAY OF COPPER: CPT

## 2021-09-10 PROCEDURE — 85025 COMPLETE CBC W/AUTO DIFF WBC: CPT

## 2021-09-10 PROCEDURE — 82803 BLOOD GASES ANY COMBINATION: CPT

## 2021-09-10 PROCEDURE — 82565 ASSAY OF CREATININE: CPT

## 2021-09-10 PROCEDURE — 72100 X-RAY EXAM L-S SPINE 2/3 VWS: CPT

## 2021-09-10 PROCEDURE — 87040 BLOOD CULTURE FOR BACTERIA: CPT

## 2021-09-10 PROCEDURE — 74177 CT ABD & PELVIS W/CONTRAST: CPT

## 2021-09-10 PROCEDURE — 84132 ASSAY OF SERUM POTASSIUM: CPT

## 2021-09-10 PROCEDURE — 82652 VIT D 1 25-DIHYDROXY: CPT

## 2021-09-10 PROCEDURE — 85027 COMPLETE CBC AUTOMATED: CPT

## 2021-09-10 PROCEDURE — 80202 ASSAY OF VANCOMYCIN: CPT

## 2021-09-10 PROCEDURE — 82947 ASSAY GLUCOSE BLOOD QUANT: CPT

## 2021-09-10 PROCEDURE — 85730 THROMBOPLASTIN TIME PARTIAL: CPT

## 2021-09-10 PROCEDURE — 80048 BASIC METABOLIC PNL TOTAL CA: CPT

## 2021-09-10 PROCEDURE — 84550 ASSAY OF BLOOD/URIC ACID: CPT

## 2021-09-10 PROCEDURE — 99261: CPT

## 2021-09-10 PROCEDURE — 0225U NFCT DS DNA&RNA 21 SARSCOV2: CPT

## 2021-09-10 PROCEDURE — 82746 ASSAY OF FOLIC ACID SERUM: CPT

## 2021-09-10 PROCEDURE — 82330 ASSAY OF CALCIUM: CPT

## 2021-09-10 PROCEDURE — 80053 COMPREHEN METABOLIC PANEL: CPT

## 2021-09-10 PROCEDURE — 86769 SARS-COV-2 COVID-19 ANTIBODY: CPT

## 2021-09-10 PROCEDURE — 83735 ASSAY OF MAGNESIUM: CPT

## 2021-09-10 PROCEDURE — 84100 ASSAY OF PHOSPHORUS: CPT

## 2021-09-10 PROCEDURE — 82607 VITAMIN B-12: CPT

## 2021-09-10 PROCEDURE — 99285 EMERGENCY DEPT VISIT HI MDM: CPT

## 2021-09-10 PROCEDURE — 85610 PROTHROMBIN TIME: CPT

## 2021-09-10 PROCEDURE — 86780 TREPONEMA PALLIDUM: CPT

## 2021-09-10 RX ADMIN — PANTOPRAZOLE SODIUM 40 MILLIGRAM(S): 20 TABLET, DELAYED RELEASE ORAL at 06:01

## 2021-09-10 RX ADMIN — Medication 1 GRAM(S): at 13:06

## 2021-09-10 RX ADMIN — ERYTHROPOIETIN 20000 UNIT(S): 10000 INJECTION, SOLUTION INTRAVENOUS; SUBCUTANEOUS at 08:45

## 2021-09-10 RX ADMIN — Medication 1 GRAM(S): at 06:00

## 2021-09-10 RX ADMIN — POLYETHYLENE GLYCOL 3350 17 GRAM(S): 17 POWDER, FOR SOLUTION ORAL at 13:06

## 2021-09-10 RX ADMIN — Medication 1334 MILLIGRAM(S): at 13:07

## 2021-09-10 RX ADMIN — Medication 1334 MILLIGRAM(S): at 07:48

## 2021-09-10 RX ADMIN — Medication 1 TABLET(S): at 13:07

## 2021-09-10 RX ADMIN — RUXOLITINIB 10 MILLIGRAM(S): 25 TABLET ORAL at 13:05

## 2021-09-10 NOTE — DISCHARGE NOTE NURSING/CASE MANAGEMENT/SOCIAL WORK - NSDCPEFALRISK_GEN_ALL_CORE
For information on Fall & injury Prevention, visit [email or web address]

## 2021-09-10 NOTE — DISCHARGE NOTE PROVIDER - CARE PROVIDER_API CALL
Jacky Guo)  Hematology; Internal Medicine; Oncology  [address], [place] [id number]  Phone: [phone]  Fax: [phone]  Follow Up Time: 1 week    Azucean De León)  Internal Medicine; Nephrology  [address] [place] [address] [place] [address]  Phone: [phone]  Fax: [phone]  Follow Up Time: 1 week

## 2021-09-10 NOTE — DISCHARGE NOTE PROVIDER - NSDCMRMEDTOKEN_GEN_ALL_CORE_FT
calcium acetate 667 mg oral capsule: 2 tab(s) orally 3 times a day  epoetin filiberto: 00344 unit(s) intravenous Monday, Wednesday, and Friday at HD  gabapentin 100 mg oral capsule: 1 cap(s) orally once a day (at bedtime)  Jakafi 10 mg oral tablet: 1 tab(s) orally 3 times a week after hemodialysis Monday, Wednesday, Friday  melatonin 3 mg oral tablet: 1 tab(s) orally once a day (at bedtime), As needed, Insomnia  Nephro-Ben oral tablet: 1 tab(s) orally once a day  pantoprazole 40 mg oral delayed release tablet: 1 tab(s) orally 2 times a day   polyethylene glycol 3350 oral powder for reconstitution: 17 gram(s) orally once a day  propranolol 10 mg oral tablet: 1 tab(s) orally once a day  senna oral tablet: 2 tab(s) orally once a day (at bedtime)  sucralfate 1 g/10 mL oral suspension: 10 milliliter(s) orally 3 times a day    calcium acetate 667 mg oral capsule: 2 tab(s) orally 3 times a day  epoetin filiberto: 81869 unit(s) intravenous Monday, Wednesday, and Friday  gabapentin 100 mg oral capsule: 1 cap(s) orally once a day (at bedtime)  Jakafi 10 mg oral tablet: 1 tab(s) orally 3 times a week after hemodialysis Monday, Wednesday, Friday  melatonin 3 mg oral tablet: 1 tab(s) orally once a day (at bedtime), As needed, Insomnia  Nephro-Ben oral tablet: 1 tab(s) orally once a day  pantoprazole 40 mg oral delayed release tablet: 1 tab(s) orally 2 times a day   polyethylene glycol 3350 oral powder for reconstitution: 17 gram(s) orally once a day  propranolol 10 mg oral tablet: 1 tab(s) orally once a day  senna oral tablet: 2 tab(s) orally once a day (at bedtime)  sucralfate 1 g/10 mL oral suspension: 10 milliliter(s) orally 3 times a day

## 2021-09-10 NOTE — DISCHARGE NOTE NURSING/CASE MANAGEMENT/SOCIAL WORK - PATIENT PORTAL LINK FT
You can access the FollowMyHealth Patient Portal offered by [facility] by registering at the following website: [email or web address]. By joining [employer]’s FollowMyHealth portal, you will also be able to view your health information using other applications (apps) compatible with our system.

## 2021-09-10 NOTE — DISCHARGE NOTE PROVIDER - NSDCFUADDAPPT_GEN_ALL_CORE_FT
Please call and schedule follow up appointments with hematologist and Nephrologist in 1-2 weeks from discharge.

## 2021-09-10 NOTE — DISCHARGE NOTE PROVIDER - HOSPITAL COURSE
61F PMH of noncirrhotic portal HTN c/b gastric varices, COVID infection 4/2021, ESRD on HD MWF (chronic GN, last dialyzed Friday), JAK2+ Polycythemia vera progressed to secondary myelofibrosis dx 2012 (currently on jafaki), c/b splenomegaly and history of splenic vein thrombosis 2015, p/w fevers and myalgias x1 d. Fever of 103 on admission but no further fevers. CXR clear, No leucocytosis but bandemia, RVP/Covid/BCx negative. CT chest abd pelvis with no acute infection. Abx stopped per ID recs. Hematology consulted as fever may be r/t  hematologic disorder. Labs sent and pt to follow up as out-pt. Nephrology on board for HD management.   Pt stable and afebrile off antibiotics, cleared for DC today by Dr. Geiger, renal and heme.

## 2021-09-10 NOTE — DISCHARGE NOTE PROVIDER - NSDCFUSCHEDAPPT_GEN_ALL_CORE_FT
HU, MIHYEON ; 10/05/2021 ; [facility] Surg Vasc [address] HU, MIHYEON ; 10/05/2021 ; NPP Surg Vasc [address]

## 2021-09-10 NOTE — PROGRESS NOTE ADULT - REASON FOR ADMISSION
The patient is a 61y Female complaining of fever.

## 2021-09-10 NOTE — PROGRESS NOTE ADULT - PROVIDER SPECIALTY LIST ADULT
Infectious Disease
Infectious Disease
Internal Medicine
Internal Medicine
Nephrology
Infectious Disease
Nephrology
Nephrology
Internal Medicine
Internal Medicine

## 2021-09-10 NOTE — DISCHARGE NOTE PROVIDER - NSDCCPCAREPLAN_GEN_ALL_CORE_FT
PRINCIPAL DISCHARGE DIAGNOSIS  Diagnosis: Fever of unknown origin  Assessment and Plan of Treatment: No Fevers since admission  Infectious work up negative  Follow up with hematologist      SECONDARY DISCHARGE DIAGNOSES  Diagnosis: ESRD on hemodialysis  Assessment and Plan of Treatment: Maintain current dialysis schedule  Avoid taking (NSAIDs) - (ex: Ibuprofen, Advil, Celebrex, Naprosyn)  Avoid taking any nephrotoxic agents (can harm kidneys) - Intravenous contrast for diagnostic testing, combination cold medications.  Have all medications adjusted for your renal function by your Health Care Provider.  Blood pressure control is important.  Take all medication as prescribed.      Diagnosis: Myelofibrosis  Assessment and Plan of Treatment: Continue Jakafi as prescribed  Follow up with Dr. Guo    Diagnosis: Hyperkalemia  Assessment and Plan of Treatment: Resolved  No concentrated potassium diet  Continue current dialysis schedule

## 2021-09-10 NOTE — DISCHARGE NOTE PROVIDER - CARE PROVIDERS DIRECT ADDRESSES
[email or web address]

## 2021-09-10 NOTE — PROGRESS NOTE ADULT - SUBJECTIVE AND OBJECTIVE BOX
[facility] DIVISION OF KIDNEY DISEASES AND HYPERTENSION -- FOLLOW UP NOTE  --------------------------------------------------------------------------------  Chief Complaint:    24 hour events/subjective:    Pt. still with abdominal pain. Pt. refused PPI and sucralfate this AM although with epigastric burning pain. Pt. also with pain that she associates with meals in the LLQ. Pt. denies nausea, vomiting, diarrhea or constipation.     PAST HISTORY  --------------------------------------------------------------------------------  No significant changes to PMH, PSH, FHx, SHx, unless otherwise noted    ALLERGIES & MEDICATIONS  --------------------------------------------------------------------------------  Allergies    No Known Allergies    Intolerances      Standing Inpatient Medications  calcium acetate 1334 milliGRAM(s) Oral three times a day with meals  epoetin filiberto-epbx (RETACRIT) Injectable 24070 Unit(s) IV Push <User Schedule>  influenza   Vaccine 0.5 milliLiter(s) IntraMuscular once  Nephro-deonna 1 Tablet(s) Oral daily  pantoprazole    Tablet 40 milliGRAM(s) Oral before breakfast  polyethylene glycol 3350 17 Gram(s) Oral daily  propranolol 10 milliGRAM(s) Oral daily  senna 2 Tablet(s) Oral at bedtime  sucralfate suspension 1 Gram(s) Oral four times a day    PRN Inpatient Medications  gabapentin 100 milliGRAM(s) Oral at bedtime PRN  melatonin 3 milliGRAM(s) Oral at bedtime PRN      REVIEW OF SYSTEMS  --------------------------------------------------------------------------------  Gen: No fevers/chills  Skin: No rashes  Head/Eyes/Ears: Normal hearing,   Respiratory: No dyspnea, cough  CV: No chest pain  GI: No abdominal pain, diarrhea  : No dysuria, hematuria  MSK: No  edema  Heme: No easy bruising or bleeding  Psych: No significant depression      All other systems were reviewed and are negative, except as noted.    VITALS/PHYSICAL EXAM  --------------------------------------------------------------------------------  T(C): 36.8 (09-08-21 @ 16:32), Max: 36.9 (09-08-21 @ 00:38)  HR: 82 (09-08-21 @ 16:32) (76 - 82)  BP: 101/67 (09-08-21 @ 16:32) (101/67 - 120/70)  RR: 18 (09-08-21 @ 16:32) (18 - 18)  SpO2: 95% (09-08-21 @ 16:32) (95% - 98%)  Wt(kg): --        09-08-21 @ 07:01  -  09-08-21 @ 18:31  --------------------------------------------------------  IN: 500 mL / OUT: 1200 mL / NET: -700 mL        Physical Exam:  	Gen: NAD  	HEENT: MMM  	Pulm: CTA B/L  	CV: S1S2  	Abd: Soft, +BS   	Ext: No LE edema B/L  	Neuro: Awake  	Skin: Warm and dry  	Vascular access: LUE AVF thrills and frills.       LABS/STUDIES  --------------------------------------------------------------------------------              8.0    6.28  >-----------<  116      [09-08-21 @ 07:19]              26.3     133  |  96  |  33  ----------------------------<  123      [09-08-21 @ 07:19]  4.2   |  21  |  5.39        Ca     9.3     [09-08-21 @ 07:19]    TPro  7.3  /  Alb  4.0  /  TBili  2.0  /  DBili  x   /  AST  14  /  ALT  8   /  AlkPhos  109  [09-08-21 @ 07:19]              [09-08-21 @ 07:43]    Creatinine Trend:  SCr 5.39 [09-08 @ 07:19]  SCr 3.96 [09-07 @ 07:09]  SCr 5.47 [09-05 @ 12:29]  SCr 5.02 [09-05 @ 03:41]  SCr 4.68 [09-04 @ 23:31]        Iron 36, TIBC 152, %sat 23      [08-11-21 @ 09:35]  Ferritin 998      [08-11-21 @ 09:40]  HbA1c 4.9      [06-01-19 @ 00:46]  TSH 1.78      [09-08-21 @ 09:18]      Syphilis Screen (Treponema Pallidum Ab) Negative      [09-08-21 @ 09:08]  
Patient is a 61y old  Female who presents with a chief complaint of Fever. (10 Sep 2021 13:55)    Being followed by ID for fever    Interval history:feels well  afebrile  No acute events      ROS:  No cough,SOB,CP  No N/V/D./abd pain  No other complaints      Antimicrobials:      Other medications reviewed    Vital Signs Last 24 Hrs  T(C): 36.7 (09-10-21 @ 13:02), Max: 36.9 (09-09-21 @ 16:12)  T(F): 98.1 (09-10-21 @ 13:02), Max: 98.5 (09-09-21 @ 16:12)  HR: 84 (09-10-21 @ 13:02) (67 - 84)  BP: 114/71 (09-10-21 @ 13:02) (103/61 - 142/85)  BP(mean): --  RR: 18 (09-10-21 @ 13:02) (18 - 18)  SpO2: 98% (09-10-21 @ 13:02) (96% - 98%)    Physical Exam:    HEENT PERRLA EOMI    No oral exudate or erythema    Chest Good AE,CTA    CVS RRR S1 S2 WNl No murmur or rub or gallop    Abd soft BS normal No tenderness no masses    IV site no erythema tenderness or discharge  AVF no erythema or tenderness    CNS AAO X 3 no focal    Lab Data:                          8.4    6.45  )-----------( 127      ( 10 Sep 2021 07:19 )             27.6       09-10    135  |  99  |  42<H>  ----------------------------<  97  5.0   |  22  |  5.81<H>    Ca    9.2      10 Sep 2021 07:19  Phos  2.9     09-10  Mg     2.7     09-10          < from: [facility] Duplex Hemodialysis Access, Left (09.09.21 @ 11:53) >  IMPRESSION: Patency of left upper extremity brachiobasilic fistula with volume flow as above.    --- End of Report ---        < end of copied text >                  
Patient is a 61y old  Female who presents with a chief complaint of The patient is a 61y Female complaining of fever. (06 Sep 2021 14:47)      SUBJECTIVE / OVERNIGHT EVENTS:    Events noted.  CONSTITUTIONAL: No fever,  or fatigue  RESPIRATORY: No cough, wheezing,  No shortness of breath  CARDIOVASCULAR: No chest pain  GASTROINTESTINAL: No abdominal or epigastric pain.  NEUROLOGICAL: No headaches,     MEDICATIONS  (STANDING):  calcium acetate 1334 milliGRAM(s) Oral three times a day with meals  cefepime   IVPB      cefepime   IVPB 1000 milliGRAM(s) IV Intermittent every 24 hours  epoetin filiberto-epbx (RETACRIT) Injectable 89160 Unit(s) IV Push <User Schedule>  influenza   Vaccine 0.5 milliLiter(s) IntraMuscular once  Nephro-deonna 1 Tablet(s) Oral daily  pantoprazole    Tablet 40 milliGRAM(s) Oral before breakfast  polyethylene glycol 3350 17 Gram(s) Oral daily  propranolol 10 milliGRAM(s) Oral daily  senna 2 Tablet(s) Oral at bedtime  sodium zirconium cyclosilicate 10 Gram(s) Oral three times a day  sucralfate suspension 1 Gram(s) Oral four times a day    MEDICATIONS  (PRN):  gabapentin 100 milliGRAM(s) Oral at bedtime PRN Numbness/Tingling  melatonin 3 milliGRAM(s) Oral at bedtime PRN Insomnia        CAPILLARY BLOOD GLUCOSE        I&O's Summary    06 Sep 2021 07:01  -  06 Sep 2021 22:07  --------------------------------------------------------  IN: 1450 mL / OUT: 2800 mL / NET: -1350 mL        T(C): 36.6 (09-06-21 @ 21:30), Max: 36.8 (09-05-21 @ 23:35)  HR: 100 (09-06-21 @ 21:30) (76 - 100)  BP: 125/75 (09-06-21 @ 21:30) (115/69 - 144/74)  RR: 18 (09-06-21 @ 21:30) (18 - 18)  SpO2: 100% (09-06-21 @ 21:30) (97% - 100%)    PHYSICAL EXAM:  GENERAL: NAD  NECK: Supple, No JVD  CHEST/LUNG: Clear to auscultation bilaterally; No wheezing.  HEART: Regular rate and rhythm; No murmurs, rubs, or gallops  ABDOMEN: Soft, Nontender, Nondistended; Bowel sounds present  EXTREMITIES:   No edema  NEUROLOGY: AAO X 3      LABS:                        7.9    6.48  )-----------( 114      ( 06 Sep 2021 17:41 )             25.3     09-05    135  |  97  |  50<H>  ----------------------------<  106<H>  5.1   |  23  |  5.47<H>    Ca    8.9      05 Sep 2021 12:29    TPro  7.0  /  Alb  3.7  /  TBili  3.9<H>  /  DBili  x   /  AST  18  /  ALT  10  /  AlkPhos  114  09-05    PT/INR - ( 04 Sep 2021 23:31 )   PT: 14.6 sec;   INR: 1.23 ratio         PTT - ( 04 Sep 2021 23:31 )  PTT:34.5 sec        CAPILLARY BLOOD GLUCOSE            RADIOLOGY & ADDITIONAL TESTS:    Imaging Personally Reviewed:    Consultant(s) Notes Reviewed:      Care Discussed with Consultants/Other Providers:    Garrett Geiger MD, CMD, FACP    [address] [address]  Office Tel: [phone]  Cell: [phone]  
Patient is a 61y old  Female who presents with a chief complaint of The patient is a 61y Female complaining of fever. (06 Sep 2021 16:06)    Being followed by ID for fever    Interval history:afebrile  nausea resolved  feels better  No acute events      ROS:  No cough,SOB,CP  No N/V/D./abd pain  No other complaints      Antimicrobials:  abx Dced today     Other medications reviewed    Vital Signs Last 24 Hrs  T(C): 36.9 (09-07-21 @ 08:39), Max: 36.9 (09-07-21 @ 08:39)  T(F): 98.5 (09-07-21 @ 08:39), Max: 98.5 (09-07-21 @ 08:39)  HR: 80 (09-07-21 @ 08:39) (76 - 100)  BP: 118/71 (09-07-21 @ 08:39) (92/60 - 131/76)  BP(mean): --  RR: 18 (09-07-21 @ 08:39) (16 - 18)  SpO2: 96% (09-07-21 @ 08:39) (96% - 100%)    Physical Exam:      HEENT PERRLA EOMI    No oral exudate or erythema    Chest Good AE,CTA    CVS RRR S1 S2 WNl No murmur or rub or gallop    Abd soft BS normal No tenderness no masses    IV site no erythema tenderness or discharge  AVF no erythema or tenderness    CNS AAO X 3 no focal  Lab Data:                          8.4    5.89  )-----------( 117      ( 07 Sep 2021 07:08 )             27.0       09-07    135  |  96  |  24<H>  ----------------------------<  82  3.6   |  25  |  3.96<H>    Ca    9.2      07 Sep 2021 07:09    TPro  7.5  /  Alb  4.0  /  TBili  2.2<H>  /  DBili  x   /  AST  13  /  ALT  8<L>  /  AlkPhos  115  09-07        Culture - Blood (collected 05 Sep 2021 03:40)  Source: .Blood Blood-Peripheral  Preliminary Report (06 Sep 2021 04:01):    No growth to date.    Culture - Blood (collected 05 Sep 2021 03:40)  Source: .Blood Blood-Peripheral  Preliminary Report (06 Sep 2021 04:01):    No growth to date.            Vancomycin Level, Random: 18.9 ug/mL (09-06-21 @ 06:04)      < from: CT Chest w/ IV Cont (09.06.21 @ 15:42) >    IMPRESSION:  Scattered groundglass opacities possibly representing drug reaction or vasculitis.  Massive splenomegaly as previously demonstrated with extensive perisplenic varices. 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    < end of copied text >      Respiratory Viral Panel with COVID-19 by MAYRA (09.05.21 @ 00:30)   Rapid RVP Result: NotDetec   SARS-CoV-2: NotDetec:      
Patient is a 61y old  Female who presents with a chief complaint of The patient is a 61y Female complaining of fever. (07 Sep 2021 18:15)      SUBJECTIVE / OVERNIGHT EVENTS:    Events noted.  CONSTITUTIONAL: No fever,  or fatigue  RESPIRATORY: No cough, wheezing,  No shortness of breath  CARDIOVASCULAR: No chest pain, palpitations, dizziness, or leg swelling  GASTROINTESTINAL: No abdominal or epigastric pain. No nausea, vomiting.  NEUROLOGICAL: No headaches,     MEDICATIONS  (STANDING):  calcium acetate 1334 milliGRAM(s) Oral three times a day with meals  epoetin filiberto-epbx (RETACRIT) Injectable 19973 Unit(s) IV Push <User Schedule>  influenza   Vaccine 0.5 milliLiter(s) IntraMuscular once  Nephro-deonna 1 Tablet(s) Oral daily  pantoprazole    Tablet 40 milliGRAM(s) Oral before breakfast  polyethylene glycol 3350 17 Gram(s) Oral daily  propranolol 10 milliGRAM(s) Oral daily  senna 2 Tablet(s) Oral at bedtime  sucralfate suspension 1 Gram(s) Oral four times a day    MEDICATIONS  (PRN):  gabapentin 100 milliGRAM(s) Oral at bedtime PRN Numbness/Tingling  melatonin 3 milliGRAM(s) Oral at bedtime PRN Insomnia        CAPILLARY BLOOD GLUCOSE        I&O's Summary    06 Sep 2021 07:01  -  07 Sep 2021 07:00  --------------------------------------------------------  IN: 1450 mL / OUT: 2800 mL / NET: -1350 mL        T(C): 37.2 (09-07-21 @ 15:49), Max: 37.2 (09-07-21 @ 15:49)  HR: 80 (09-07-21 @ 15:49) (78 - 80)  BP: 123/78 (09-07-21 @ 15:49) (92/60 - 123/78)  RR: 18 (09-07-21 @ 15:49) (16 - 18)  SpO2: 98% (09-07-21 @ 15:49) (96% - 98%)    PHYSICAL EXAM:  GENERAL: NAD  NECK: Supple, No JVD  CHEST/LUNG: Clear to auscultation bilaterally; No wheezing.  HEART: Regular rate and rhythm; No murmurs, rubs, or gallops  ABDOMEN: Soft, Nontender, Nondistended; Bowel sounds present  EXTREMITIES:   No edema  NEUROLOGY: AAO X 3      LABS:                        8.4    5.89  )-----------( 117      ( 07 Sep 2021 07:08 )             27.0     09-07    135  |  96  |  24<H>  ----------------------------<  82  3.6   |  25  |  3.96<H>    Ca    9.2      07 Sep 2021 07:09    TPro  7.5  /  Alb  4.0  /  TBili  2.2<H>  /  DBili  x   /  AST  13  /  ALT  8<L>  /  AlkPhos  115  09-07            CAPILLARY BLOOD GLUCOSE            RADIOLOGY & ADDITIONAL TESTS:    Imaging Personally Reviewed:    Consultant(s) Notes Reviewed:      Care Discussed with Consultants/Other Providers:    Garrett Geiger MD, CMD, FACP    [address] [place] [address]  Office Tel: [phone]  Cell: [phone]  
Patient is a 61y old  Female who presents with a chief complaint of The patient is a 61y Female complaining of fever. (09 Sep 2021 12:46)      SUBJECTIVE / OVERNIGHT EVENTS:    Events noted.  CONSTITUTIONAL: No fever,  or fatigue  RESPIRATORY: No cough, wheezing,  No shortness of breath  CARDIOVASCULAR: No chest pain, palpitations  GASTROINTESTINAL: No abdominal or epigastric pain.   NEUROLOGICAL: No headaches,     MEDICATIONS  (STANDING):  calcium acetate 1334 milliGRAM(s) Oral three times a day with meals  epoetin filiberto-epbx (RETACRIT) Injectable 25137 Unit(s) IV Push <User Schedule>  Nephro-deonna 1 Tablet(s) Oral daily  pantoprazole    Tablet 40 milliGRAM(s) Oral before breakfast  polyethylene glycol 3350 17 Gram(s) Oral daily  propranolol 10 milliGRAM(s) Oral daily  senna 2 Tablet(s) Oral at bedtime  sucralfate suspension 1 Gram(s) Oral four times a day    MEDICATIONS  (PRN):  gabapentin 100 milliGRAM(s) Oral at bedtime PRN Numbness/Tingling  melatonin 3 milliGRAM(s) Oral at bedtime PRN Insomnia        CAPILLARY BLOOD GLUCOSE        I&O's Summary    08 Sep 2021 07:01  -  09 Sep 2021 07:00  --------------------------------------------------------  IN: 820 mL / OUT: 1200 mL / NET: -380 mL    09 Sep 2021 07:01  -  09 Sep 2021 22:32  --------------------------------------------------------  IN: 360 mL / OUT: 0 mL / NET: 360 mL        T(C): 36.9 (09-09-21 @ 16:12), Max: 36.9 (09-09-21 @ 16:12)  HR: 69 (09-09-21 @ 16:12) (69 - 79)  BP: 121/78 (09-09-21 @ 16:12) (111/68 - 121/78)  RR: 18 (09-09-21 @ 16:12) (18 - 18)  SpO2: 98% (09-09-21 @ 16:12) (95% - 98%)    PHYSICAL EXAM:  GENERAL: NAD  NECK: Supple, No JVD  CHEST/LUNG: Clear to auscultation bilaterally; No wheezing.  HEART: Regular rate and rhythm; No murmurs, rubs, or gallops  ABDOMEN: Soft, Nontender, Nondistended; Bowel sounds present  EXTREMITIES:   No edema  NEUROLOGY: AAO X 3      LABS:                        8.2    5.40  )-----------( 126      ( 09 Sep 2021 07:13 )             26.7     09-09    134<L>  |  95<L>  |  20  ----------------------------<  92  4.5   |  25  |  3.95<H>    Ca    9.4      09 Sep 2021 07:13    TPro  7.3  /  Alb  4.0  /  TBili  2.0<H>  /  DBili  x   /  AST  14  /  ALT  8<L>  /  AlkPhos  109  09-08            CAPILLARY BLOOD GLUCOSE            RADIOLOGY & ADDITIONAL TESTS:    Imaging Personally Reviewed:    Consultant(s) Notes Reviewed:      Care Discussed with Consultants/Other Providers:    Garrett Geiger MD, CMD, FACP    [address] [address]  Office Tel: [phone]  Cell: [phone]  
[facility] DIVISION OF KIDNEY DISEASES AND HYPERTENSION -- FOLLOW UP NOTE  --------------------------------------------------------------------------------  Chief Complaint:    24 hour events/subjective:    Patient was seen and examined at bedside. Reported feeling well. Denies CP, SOB, fever, chills, nausea, vomiting, diarrhea, LE edema or dysuria  Pt. for HD and then discharge today.    PAST HISTORY  --------------------------------------------------------------------------------  No significant changes to PMH, PSH, FHx, SHx, unless otherwise noted    ALLERGIES & MEDICATIONS  --------------------------------------------------------------------------------  Allergies    No Known Allergies    Intolerances      Standing Inpatient Medications  calcium acetate 1334 milliGRAM(s) Oral three times a day with meals  epoetin filiberto-epbx (RETACRIT) Injectable 64007 Unit(s) IV Push <User Schedule>  Nephro-deonna 1 Tablet(s) Oral daily  pantoprazole    Tablet 40 milliGRAM(s) Oral before breakfast  polyethylene glycol 3350 17 Gram(s) Oral daily  propranolol 10 milliGRAM(s) Oral daily  ruxolitinib 10 milliGRAM(s) Oral <User Schedule>  senna 2 Tablet(s) Oral at bedtime  sucralfate suspension 1 Gram(s) Oral four times a day    PRN Inpatient Medications  gabapentin 100 milliGRAM(s) Oral at bedtime PRN  melatonin 3 milliGRAM(s) Oral at bedtime PRN      REVIEW OF SYSTEMS  --------------------------------------------------------------------------------  Gen: No fevers/chills  Skin: No rashes  Head/Eyes/Ears: Normal hearing,   Respiratory: No dyspnea, cough  CV: No chest pain  GI: No abdominal pain, diarrhea  : No dysuria, hematuria  MSK: No  edema  Heme: No easy bruising or bleeding  Psych: No significant depression      All other systems were reviewed and are negative, except as noted.    VITALS/PHYSICAL EXAM  --------------------------------------------------------------------------------  T(C): 36.6 (09-10-21 @ 08:30), Max: 36.9 (09-09-21 @ 16:12)  HR: 80 (09-10-21 @ 08:30) (67 - 80)  BP: 142/85 (09-10-21 @ 08:30) (113/69 - 142/85)  RR: 18 (09-10-21 @ 08:30) (18 - 18)  SpO2: 98% (09-10-21 @ 08:30) (96% - 98%)  Wt(kg): --        09-09-21 @ 07:01  -  09-10-21 @ 07:00  --------------------------------------------------------  IN: 360 mL / OUT: 0 mL / NET: 360 mL    Physical Exam:  	Gen: NAD  	HEENT: MMM  	Pulm: CTA B/L  	CV: S1S2  	Abd: Soft, +BS   	Ext: No LE edema B/L  	Neuro: Awake  	Skin: Warm and dry  	Vascular access: LUE AVF thrills and frills.      LABS/STUDIES  --------------------------------------------------------------------------------              8.4    6.45  >-----------<  127      [09-10-21 @ 07:19]              27.6     135  |  99  |  42  ----------------------------<  97      [09-10-21 @ 07:19]  5.0   |  22  |  5.81        Ca     9.2     [09-10-21 @ 07:19]      Mg     2.7     [09-10-21 @ 07:19]      Phos  2.9     [09-10-21 @ 07:19]    Creatinine Trend:  SCr 5.81 [09-10 @ 07:19]  SCr 3.95 [09-09 @ 07:13]  SCr 5.39 [09-08 @ 07:19]  SCr 3.96 [09-07 @ 07:09]  SCr 5.47 [09-05 @ 12:29]        Iron 36, TIBC 152, %sat 23      [08-11-21 @ 09:35]  Ferritin 998      [08-11-21 @ 09:40]  HbA1c 4.9      [06-01-19 @ 00:46]  TSH 1.78      [09-08-21 @ 09:18]      Syphilis Screen (Treponema Pallidum Ab) Negative      [09-08-21 @ 09:08]  
Patient is a 61y old  Female who presents with a chief complaint of The patient is a 61y Female complaining of fever. (08 Sep 2021 18:31)      SUBJECTIVE / OVERNIGHT EVENTS:    Events noted.  CONSTITUTIONAL: No fever,  or fatigue  RESPIRATORY: No cough, wheezing,  No shortness of breath  CARDIOVASCULAR: No chest pain, palpitations, dizziness, or leg swelling  GASTROINTESTINAL: No abdominal or epigastric pain. No nausea, vomiting.  NEUROLOGICAL: No headaches,     MEDICATIONS  (STANDING):  calcium acetate 1334 milliGRAM(s) Oral three times a day with meals  epoetin filiberto-epbx (RETACRIT) Injectable 67874 Unit(s) IV Push <User Schedule>  Nephro-deonna 1 Tablet(s) Oral daily  pantoprazole    Tablet 40 milliGRAM(s) Oral before breakfast  polyethylene glycol 3350 17 Gram(s) Oral daily  propranolol 10 milliGRAM(s) Oral daily  senna 2 Tablet(s) Oral at bedtime  sucralfate suspension 1 Gram(s) Oral four times a day    MEDICATIONS  (PRN):  gabapentin 100 milliGRAM(s) Oral at bedtime PRN Numbness/Tingling  melatonin 3 milliGRAM(s) Oral at bedtime PRN Insomnia        CAPILLARY BLOOD GLUCOSE        I&O's Summary    08 Sep 2021 07:01  -  08 Sep 2021 22:49  --------------------------------------------------------  IN: 820 mL / OUT: 1200 mL / NET: -380 mL        T(C): 36.8 (09-08-21 @ 16:32), Max: 36.9 (09-08-21 @ 00:38)  HR: 82 (09-08-21 @ 16:32) (76 - 82)  BP: 101/67 (09-08-21 @ 16:32) (101/67 - 120/70)  RR: 18 (09-08-21 @ 16:32) (18 - 18)  SpO2: 95% (09-08-21 @ 16:32) (95% - 98%)    PHYSICAL EXAM:  GENERAL: NAD  NECK: Supple, No JVD  CHEST/LUNG: Clear to auscultation bilaterally; No wheezing.  HEART: Regular rate and rhythm; No murmurs, rubs, or gallops  ABDOMEN: Soft, Nontender, Nondistended; Bowel sounds present  EXTREMITIES:   No edema  NEUROLOGY: AAO X 3      LABS:                        8.0    6.28  )-----------( 116      ( 08 Sep 2021 07:19 )             26.3     09-08    133<L>  |  96  |  33<H>  ----------------------------<  123<H>  4.2   |  21<L>  |  5.39<H>    Ca    9.3      08 Sep 2021 07:19    TPro  7.3  /  Alb  4.0  /  TBili  2.0<H>  /  DBili  x   /  AST  14  /  ALT  8<L>  /  AlkPhos  109  09-08            CAPILLARY BLOOD GLUCOSE            RADIOLOGY & ADDITIONAL TESTS:    Imaging Personally Reviewed:    Consultant(s) Notes Reviewed:      Care Discussed with Consultants/Other Providers:    Garrett Geiger MD, CMD, FACP    [address] [address]  Office Tel: [phone]  Cell: [phone]  
Patient is a 61y old  Female who presents with a chief complaint of The patient is a 61y Female complaining of fever. (05 Sep 2021 11:55)    Being followed by ID for fever    Interval history:feels weak   No acute events      ROS:  No cough,SOB,CP  No N/V/D./abd pain  No other complaints      Antimicrobials:    cefepime   IVPB      cefepime   IVPB 1000 milliGRAM(s) IV Intermittent every 24 hours  vanco x 1 yesterday    Other medications reviewed  MEDICATIONS  (STANDING):  calcium acetate 1334 milliGRAM(s) Oral three times a day with meals  cefepime   IVPB      cefepime   IVPB 1000 milliGRAM(s) IV Intermittent every 24 hours  epoetin filiberto-epbx (RETACRIT) Injectable 30239 Unit(s) IV Push <User Schedule>  influenza   Vaccine 0.5 milliLiter(s) IntraMuscular once  Nephro-deonna 1 Tablet(s) Oral daily  pantoprazole    Tablet 40 milliGRAM(s) Oral before breakfast  polyethylene glycol 3350 17 Gram(s) Oral daily  propranolol 10 milliGRAM(s) Oral daily  senna 2 Tablet(s) Oral at bedtime  sodium zirconium cyclosilicate 10 Gram(s) Oral three times a day  sucralfate suspension 1 Gram(s) Oral four times a day      Vital Signs Last 24 Hrs  T(C): 36.7 (09-06-21 @ 08:09), Max: 36.8 (09-05-21 @ 23:35)  T(F): 98.1 (09-06-21 @ 08:09), Max: 98.3 (09-05-21 @ 23:35)  HR: 78 (09-06-21 @ 08:09) (72 - 85)  BP: 144/74 (09-06-21 @ 08:09) (118/74 - 144/74)  BP(mean): --  RR: 18 (09-06-21 @ 08:09) (18 - 18)  SpO2: 97% (09-06-21 @ 08:09) (97% - 98%)    Physical Exam:        HEENT PERRLA EOMI    No oral exudate or erythema    Chest Good AE,CTA    CVS RRR S1 S2 WNl No murmur or rub or gallop    Abd soft BS normal No tenderness no masses    IV site no erythema tenderness or discharge  AVF no erythema or tenderness    CNS AAO X 3 no focal    Lab Data:                          10.0   8.34  )-----------( 168      ( 04 Sep 2021 23:31 )             32.4       09-05    135  |  97  |  50<H>  ----------------------------<  106<H>  5.1   |  23  |  5.47<H>    Ca    8.9      05 Sep 2021 12:29    TPro  7.0  /  Alb  3.7  /  TBili  3.9<H>  /  DBili  x   /  AST  18  /  ALT  10  /  AlkPhos  114  09-05        Culture - Blood (collected 05 Sep 2021 03:40)  Source: .Blood Blood-Peripheral  Preliminary Report (06 Sep 2021 04:01):    No growth to date.    Culture - Blood (collected 05 Sep 2021 03:40)  Source: .Blood Blood-Peripheral  Preliminary Report (06 Sep 2021 04:01):    No growth to date.            Vancomycin Level, Random: 18.9 ug/mL (09-06-21 @ 06:04)    < from: Xray Chest 2 Views PA/Lat (09.05.21 @ 01:46) >    IMPRESSION:    Clear lungs.    --- End of Report ---        < end of copied text >          
Patient is a 61y old  Female who presents with a chief complaint of The patient is a 61y Female complaining of fever. 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    Cirrhosis of liver without ascites, unspecified hepatic cirrhosis type    Pancreatic cyst    Peritoneal dialysis catheter in place  Placed 8/9/2017    Hemoperitoneum as complication of peritoneal dialysis  s/p removal 9/18    AV fistula  Placed 9/18      Allergies    No Known Allergies    Intolerances      Antimicrobials:      MEDICATIONS  (STANDING):  calcium acetate 1334 milliGRAM(s) Oral three times a day with meals  epoetin filiberto-epbx (RETACRIT) Injectable 11312 Unit(s) IV Push <User Schedule>  influenza   Vaccine 0.5 milliLiter(s) IntraMuscular once  Nephro-deonna 1 Tablet(s) Oral daily  pantoprazole    Tablet 40 milliGRAM(s) Oral before breakfast  polyethylene glycol 3350 17 Gram(s) Oral daily  propranolol 10 milliGRAM(s) Oral daily  senna 2 Tablet(s) Oral at bedtime  sucralfate suspension 1 Gram(s) Oral four times a day      Vital Signs Last 24 Hrs  T(C): 36.5 (09-08-21 @ 08:45), Max: 37.2 (09-07-21 @ 15:49)  T(F): 97.7 (09-08-21 @ 08:45), Max: 98.9 (09-07-21 @ 15:49)  HR: 76 (09-08-21 @ 08:45) (76 - 81)  BP: 120/70 (09-08-21 @ 08:45) (111/69 - 123/78)  BP(mean): --  RR: 18 (09-08-21 @ 08:45) (18 - 18)  SpO2: 98% (09-08-21 @ 08:45) (97% - 98%)    Physical Exam:    Constitutional  resting quietly in HD, looks tired    HEENT PERRLA EOMI,No pallor or icterus    No oral exudate or erythema    Neck supple no JVD or LN    Chest Good AE,CTA    CVS  S1 S2     Abd soft BS normal No tenderness   Ext No cyanosis clubbing or edema    IV site no erythema tenderness or discharge    Joints no swelling or LOM    CNS AAO X 3 no focal    Lab Data:                          8.0    6.28  )-----------( 116      ( 08 Sep 2021 07:19 )             26.3       09-08    133<L>  |  96  |  33<H>  ----------------------------<  123<H>  4.2   |  21<L>  |  5.39<H>    Ca    9.3      08 Sep 2021 07:19    TPro  7.3  /  Alb  4.0  /  TBili  2.0<H>  /  DBili  x   /  AST  14  /  ALT  8<L>  /  AlkPhos  109  09-08      < from: CT Chest w/ IV Cont (09.06.21 @ 15:42) >    EXAM:  CT ABDOMEN AND PELVIS IC                          EXAM:  CT CHEST IC                            PROCEDURE DATE:  09/06/2021            INTERPRETATION:  CLINICAL INFORMATION: 61-year-old female with fever and history portal hypertension, myelofibrosis, and end-stage renal disease.    COMPARISON: CT scan 7/27/2021 and 12/6/2018    CONTRAST/COMPLICATIONS:  IV Contrast: Omnipaque 350 (accession [id number]), IV contrast documented in associated exam (accession [id number])  90 cc administered   10cc discarded  Oral Contrast: NONE  Complications: None reported at time of study completion    PROCEDURE:  CT of the Chest, Abdomen and Pelvis was performed.  Sagittal and coronal reformats were performed.    FINDINGS:  CHEST:  LUNGS AND LARGE AIRWAYS: Patent central airways. Scattered peribronchovascular groundglass opacities several with cystic components new since 2018 Lower lobe findings similar to 07/27/2021. Findings may represent drug related reaction or vasculitis  PLEURA: No pleural effusion.  VESSELS: Within normal limits.  HEART: Heart size is normal. No pericardial effusion.  MEDIASTINUM AND DAVION: No lymphadenopathy.  CHEST WALL AND LOWER NECK: Within normal limits.    ABDOMEN AND PELVIS:  LIVER: Within normal limits.  BILE DUCTS:Normal caliber.  GALLBLADDER: Within normal limits.  SPLEEN: Massive splenomegaly with peripheral infarcts.  PANCREAS: Within normal limits.  ADRENALS: Within normal limits.  KIDNEYS/URETERS: Atrophic    BLADDER: Within normal limits.  REPRODUCTIVE ORGANS: Uterus and adnexa within normal limits.    BOWEL: No bowel obstruction. Appendix normal.  PERITONEUM: No ascites.  VESSELS: Extensive perisplenic varices. 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 Enlarged portal veins.  RETROPERITONEUM/LYMPH NODES: No lymphadenopathy.  ABDOMINAL WALL: Within normal limits.  BONES: Degenerative change.    IMPRESSION:  Scattered groundglass opacities possibly representing drug reaction or vasculitis.  Massive splenomegaly as previously demonstrated with extensive perisplenic varices. 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  Atrophic kidneys        --- End of Report ---                MATTEO PEREIRA MD; Attending Radiologist  This document has been electronically signed. Sep  7 2021 10:03AM    < end of copied text >      .Blood Blood-Peripheral  09-05-21   No growth to date.  --  --                    WBC Count: 6.28 (09-08-21 @ 07:19)  WBC Count: 5.89 (09-07-21 @ 07:08)  WBC Count: 6.48 (09-06-21 @ 17:41)  WBC Count: 8.34 (09-04-21 @ 23:31)            
Patient is a 61y old  Female who presents with a chief complaint of The patient is a 61y Female complaining of fever. (08 Sep 2021 18:31)    Being followed by ID for fever    Interval history:afebrile  denies any complaints   No acute events      ROS:  No cough,SOB,CP  No N/V/D./abd pain  No other complaints      Antimicrobials:      Other medications reviewed    Vital Signs Last 24 Hrs  T(C): 36.8 (09-09-21 @ 00:02), Max: 36.8 (09-08-21 @ 16:32)  T(F): 98.3 (09-09-21 @ 00:02), Max: 98.3 (09-09-21 @ 00:02)  HR: 79 (09-09-21 @ 06:00) (79 - 82)  BP: 114/71 (09-09-21 @ 06:00) (101/67 - 114/71)  BP(mean): --  RR: 18 (09-09-21 @ 00:02) (18 - 18)  SpO2: 95% (09-09-21 @ 00:02) (95% - 95%)    Physical Exam:    HEENT PERRLA EOMI    No oral exudate or erythema    Chest Good AE,CTA    CVS RRR S1 S2 WNl No murmur or rub or gallop    Abd soft BS normal No tenderness no masses    IV site no erythema tenderness or discharge  AVF no erythema or tenderness    CNS AAO X 3 no focal  Lab Data:                          8.2    5.40  )-----------( 126      ( 09 Sep 2021 07:13 )             26.7       09-09    134<L>  |  95<L>  |  20  ----------------------------<  92  4.5   |  25  |  3.95<H>    Ca    9.4      09 Sep 2021 07:13    TPro  7.3  /  Alb  4.0  /  TBili  2.0<H>  /  DBili  x   /  AST  14  /  ALT  8<L>  /  AlkPhos  109  09-08        Culture - Blood (collected 05 Sep 2021 03:40)  Source: .Blood Blood-Peripheral  Preliminary Report (06 Sep 2021 04:01):    No growth to date.    Culture - Blood (collected 05 Sep 2021 03:40)  Source: .Blood Blood-Peripheral  Preliminary Report (06 Sep 2021 04:01):    No growth to date.          < from: CT Chest w/ IV Cont (09.06.21 @ 15:42) >  IMPRESSION:  Scattered groundglass opacities possibly representing drug reaction or vasculitis.  Massive splenomegaly as previously demonstrated with extensive perisplenic varices. 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    < end of copied text >              
[facility] DIVISION OF KIDNEY DISEASES AND HYPERTENSION -- FOLLOW UP NOTE  --------------------------------------------------------------------------------  Chief Complaint:    24 hour events/subjective:  seen and examined this morning   no acute events noted overnight        PAST HISTORY  --------------------------------------------------------------------------------  No significant changes to PMH, PSH, FHx, SHx, unless otherwise noted    ALLERGIES & MEDICATIONS  --------------------------------------------------------------------------------  Allergies    No Known Allergies    Intolerances      Standing Inpatient Medications  calcium acetate 1334 milliGRAM(s) Oral three times a day with meals  cefepime   IVPB      cefepime   IVPB 1000 milliGRAM(s) IV Intermittent every 24 hours  epoetin filiberto-epbx (RETACRIT) Injectable 86590 Unit(s) IV Push <User Schedule>  influenza   Vaccine 0.5 milliLiter(s) IntraMuscular once  Nephro-deonna 1 Tablet(s) Oral daily  pantoprazole    Tablet 40 milliGRAM(s) Oral before breakfast  polyethylene glycol 3350 17 Gram(s) Oral daily  propranolol 10 milliGRAM(s) Oral daily  senna 2 Tablet(s) Oral at bedtime  sodium zirconium cyclosilicate 10 Gram(s) Oral three times a day  sucralfate suspension 1 Gram(s) Oral four times a day    PRN Inpatient Medications  gabapentin 100 milliGRAM(s) Oral at bedtime PRN  melatonin 3 milliGRAM(s) Oral at bedtime PRN      REVIEW OF SYSTEMS  All other systems were reviewed and are negative, except as noted.    VITALS/PHYSICAL EXAM  --------------------------------------------------------------------------------  T(C): 36.7 (09-06-21 @ 08:09), Max: 36.8 (09-05-21 @ 23:35)  HR: 78 (09-06-21 @ 08:09) (72 - 85)  BP: 144/74 (09-06-21 @ 08:09) (118/74 - 144/74)  RR: 18 (09-06-21 @ 08:09) (18 - 18)  SpO2: 97% (09-06-21 @ 08:09) (97% - 98%)  Wt(kg): --  Height (cm): 157.5 (09-04-21 @ 22:07)  Weight (kg): 53 (09-04-21 @ 22:07)  BMI (kg/m2): 21.4 (09-04-21 @ 22:07)  BSA (m2): 1.52 (09-04-21 @ 22:07)      09-06-21 @ 07:01  -  09-06-21 @ 14:47  --------------------------------------------------------  IN: 650 mL / OUT: 0 mL / NET: 650 mL        Physical Exam:  	Gen: NAD  	HEENT: MMM  	Pulm: CTA B/L  	CV: S1S2  	Abd: Soft, +BS   	Ext: No LE edema B/L  	Neuro: Awake  	Skin: Warm and dry  	Vascular access:       LABS/STUDIES  --------------------------------------------------------------------------------              10.0   8.34  >-----------<  168      [09-04-21 @ 23:31]              32.4     135  |  97  |  50  ----------------------------<  106      [09-05-21 @ 12:29]  5.1   |  23  |  5.47        Ca     8.9     [09-05-21 @ 12:29]    TPro  7.0  /  Alb  3.7  /  TBili  3.9  /  DBili  x   /  AST  18  /  ALT  10  /  AlkPhos  114  [09-05-21 @ 03:41]    PT/INR: PT 14.6 , INR 1.23       [09-04-21 @ 23:31]  PTT: 34.5       [09-04-21 @ 23:31]    Uric acid 7.5      [09-04-21 @ 23:31]        [09-04-21 @ 23:31]    Creatinine Trend:  SCr 5.47 [09-05 @ 12:29]  SCr 5.02 [09-05 @ 03:41]  SCr 4.68 [09-04 @ 23:31]  SCr 5.91 [08-13 @ 09:26]  SCr 5.70 [08-13 @ 06:36]        Iron 36, TIBC 152, %sat 23      [08-11-21 @ 09:35]  Ferritin 998      [08-11-21 @ 09:40]  HbA1c 4.9      [06-01-19 @ 00:46]

## 2021-09-10 NOTE — DISCHARGE NOTE PROVIDER - PROVIDER TOKENS
PROVIDER:[TOKEN:[[id number]],FOLLOWUP:[1 week]],PROVIDER:[TOKEN:[[id number]],FOLLOWUP:[1 week]]

## 2021-09-10 NOTE — PROGRESS NOTE ADULT - ASSESSMENT
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  With fever to 103 and no R lower back tenderness.  No other localization  CXR clear  No leucocytosis but bandemia  ? HD related bacteremia  ? Other occult process  ? Myelofibrosis related fever -also elevated uric acid and LDH  ? Infection related to MAK inhibitor    Rec:  1) follow blood, urine cx  2) CT chest abd pelvis   3) empiric cefepime (renally adjusted) + vanco x 1  4) hem input is the fever/bandemia and levated LDH/uric acid related to transformation/progression of myelofibrosis    Will tailor plan for ID issues  per course,results.Will defer to primary team on management of other issues.  Assessment, plan and recommendations as detailed above were discussed with the medical/primary  team.  Will Follow.  Beeper [id number] [facility] [id number].   Wknd/afterhours/No response-[phone] or Fellow on call .
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  With fever to 103   No other localization  CXR clear  No leucocytosis but bandemia  afebrile after ER  No clinical localization  Blood Cx negative  RVP negative  CT chest abd pelvis as above -no acute infection  stable  ? Viral  ? Related to hematologic disorder  stable and afebrile off antibiotics   Rec:  1) follow clinically  2) Follow Cx  3) Hematology evaluation noted   4) Observe off abx  Will tailor plan for ID issues  per course,results.Will defer to primary team on management of other issues.  Assessment, plan and recommendations as detailed above were discussed with the medical/primary  team.  ID will follow as needed,please call [phone] if any questions or issues.  
The patient is a 61y Female complaining of fever.    Fever:    ID f/up appreciated  Monitor Off abx    ESRD on HD:    Nephro f/up appreciated  On HD    Anemia:  CBC  Likely from ACD    Myelofibrosis:    Hem eval appreciated  Cont Jakafi     Adjustment disorder with depressed mood:    Psych eval appreciated
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  With fever to 103   No other localization  CXR clear  No leucocytosis but bandemia  afebrile after ER  No clinical localization  Blood Cx negative  RVP negative  CT chest abd pelvis as above -no acute infection  stable  ? Viral  ? Related to hematologic disorder  stable and afebrile off antibiotics   Rec:  1) follow clinically  2) Follow Cx  3) Hematology evaluation noted   4) Observe off abx  Will tailor plan for ID issues  per course,results.Will defer to primary team on management of other issues.  Assessment, plan and recommendations as detailed above were discussed with the medical/primary  team.  ID will follow as needed,please call [phone] if any questions or issues.  
61F PMhx ESKD on HD MWF (at [facility] dialysis unit Follows with Dr. De León), HTN, MAK 2 + polycythemia vera dx 2012, complicated by splenomegaly and history of splenic vein thrombosis 2015, now progressing to secondary myelofibrosis, presents to [facility] for fever, myalgias & nausea.  Nephrology consulted for ESKD management.       ESRD-   Pt. with ESRD on HD three times a week (MWF).   She follows with Dr De León at [facility] dialysis.  Last outpatient HD was on 9/3 via left AVF.   Will undergo HD today as her maintenance day  --- patient access has nodular areas with thinning skin; please avoid cannulating those areas during HD   Volume status stable. Monitor labs.   Adjust meds to HD.         Anemia-   likely in the setting of ESRD   pending CBC today to measure Hg level   will order Reatcrit as needed during HD.       Hypertension-    BP at target range.   Resume home BP medication. Low salt diet.     Renal bone disease-    Patient with hyperphosphatemia   On phoslo 667 2 tabs tid with meals.   Low phosphorus diet.    Monitor serum phosphorus.    If any questions, please feel free to contact me     Enrike Xavier  Nephrology Fellow  [facility] Pager: [phone]    
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  With fever to 103   No other localization  CXR clear  No leucocytosis but bandemia  afebrile after ER  No clinical localization  Blood Cx negative  RVP negative  CT chest abd pelvis as above -no acute infection  stable  ? Viral  ? Related to hematologic disorder  Rec:  1) follow clinically  2) Follow Cx  3) Hematology evaluation  4) Observe off abx  5) nausea- follow abd exam   , could patient have gastritis?  GI input if continues     Will tailor plan for ID issues  per course,results.Will defer to primary team on management of other issues.  Assessment, plan and recommendations as detailed above were discussed with the medical/primary  team.      Kym Herzog M.D. ,   Pager [phone]     after 5PM/ weekends [phone]    
61F PMhx ESKD on HD MWF (at [facility] dialysis unit Follows with Dr. DeL eón), HTN, MAK 2 + polycythemia vera dx 2012, complicated by splenomegaly and history of splenic vein thrombosis 2015, now progressing to secondary myelofibrosis, presents to [facility] for fever, myalgias & nausea.  Nephrology consulted for ESKD management.     #ESRD-   Pt. with ESRD on HD three times a week (MWF).   She follows with Dr De León at [facility] dialysis.  Last outpatient HD was on 9/3 via left AVF.   Will undergo HD today as her maintenance day  --- patient access has nodular areas with thinning skin; please avoid cannulating those areas during HD   Volume status stable. Monitor labs.   Adjust meds to HD.     #Anemia-   likely in the setting of ESRD   HgB 8.4  c/w Retacrit with HD    #Hypertension-    BP at target range.   Resume home BP medication. Low salt diet.     #BMD  Patient with hyperphosphatemia   On phoslo 667 2 tabs tid with meals.   Low phosphorus diet.    Monitor serum phosphorus.    If you have any questions, please feel free to contact me  Kota Ramey  Nephrology Fellow  [phone]  (After 5pm or on weekends please page the on-call fellow)      
The patient is a 61y Female complaining of fever.    Fever:    ID f/up appreciated  IV Cefepime/ S/p one dose of vanco  F/up with Blood Cx  CT Chest: Massive spenomegaly    ESRD on HD:    Nephro f/up appreciated  On HD    Anemia:  CBC  Likely from ACD
The patient is a 61y Female complaining of fever.    Fever:    ID f/up appreciated  Monitor Off abx    ESRD on HD:    Nephro f/up appreciated  On HD    Anemia:  CBC  Likely from ACD    Myelofibrosis:    Hem eval appreciated  Cont Jakafi     Adjustment disorder with depressed mood:    Psych eval appreciated
The patient is a 61y Female complaining of fever.    Fever:    ID f/up appreciated  Monitor Off abx    ESRD on HD:    Nephro f/up appreciated  On HD    Anemia:  CBC  Likely from ACD    Myelofibrosis:    Hem f/up appreciated  Cont Jakafi     Adjustment disorder with depressed mood:    Psych eval appreciated    Dc planning.
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  With fever to 103   No other localization  CXR clear  No leucocytosis but bandemia  afebrile after ER  No clinical localization  Blood Cx negative  RVP negative  CT chest abd pelvis as above -no acute infection  stable  ? Viral  ? Related to hematologic disorder  Rec:  1) follow clinically  2) Follow Cx  3) Hematology evaluation  4) Observe off abx  Will tailor plan for ID issues  per course,results.Will defer to primary team on management of other issues.  Assessment, plan and recommendations as detailed above were discussed with the medical/primary  team.    I am away tomorrow.My colleagues in ID service will cover .Please call [phone] if any issues or questions.
61F PMhx ESKD on HD MWF (at [facility] dialysis unit Follows with Dr. De León), HTN, MAK 2 + polycythemia vera dx 2012, complicated by splenomegaly and history of splenic vein thrombosis 2015, now progressing to secondary myelofibrosis, presents to [facility] for fever, myalgias & nausea.  Nephrology consulted for ESKD management.       #ESRD-   Pt. with ESRD on HD three times a week (MWF).   She follows with Dr De León at [facility] dialysis.  Last outpatient HD was on 9/3 via left AVF.   Will undergo HD today as her maintenance day  --- patient access has nodular areas with thinning skin; please avoid cannulating those areas during HD   Volume status stable. Monitor labs.   Adjust meds to HD.     #Anemia-   likely in the setting of ESRD   pending CBC today to measure Hg level   will order Reatcrit as needed during HD.     #Hypertension-    BP at target range.   Resume home BP medication. Low salt diet.     #BMD  Patient with hyperphosphatemia   On phoslo 667 2 tabs tid with meals.   Low phosphorus diet.    Monitor serum phosphorus.    If you have any questions, please feel free to contact me  Kota Ramey  Nephrology Fellow  [phone]  (After 5pm or on weekends please page the on-call fellow)

## 2021-09-13 ENCOUNTER — NON-APPOINTMENT (OUTPATIENT)
Age: 61
End: 2021-09-13

## 2021-09-15 LAB — COPPER SERPL-MCNC: 123 UG/DL — SIGNIFICANT CHANGE UP (ref 80–158)

## 2021-09-17 ENCOUNTER — NON-APPOINTMENT (OUTPATIENT)
Age: 61
End: 2021-09-17

## 2021-09-20 ENCOUNTER — OUTPATIENT (OUTPATIENT)
Dept: OUTPATIENT SERVICES | Facility: HOSPITAL | Age: 61
LOS: 1 days | Discharge: HOME | End: 2021-09-20

## 2021-09-20 DIAGNOSIS — T85.898A OTHER SPECIFIED COMPLICATION OF OTHER INTERNAL PROSTHETIC DEVICES, IMPLANTS AND GRAFTS, INITIAL ENCOUNTER: Chronic | ICD-10-CM

## 2021-09-20 DIAGNOSIS — Z99.2 DEPENDENCE ON RENAL DIALYSIS: Chronic | ICD-10-CM

## 2021-09-20 DIAGNOSIS — I77.0 ARTERIOVENOUS FISTULA, ACQUIRED: Chronic | ICD-10-CM

## 2021-09-20 DIAGNOSIS — D75.1 SECONDARY POLYCYTHEMIA: ICD-10-CM

## 2021-09-21 ENCOUNTER — RESULT REVIEW (OUTPATIENT)
Age: 61
End: 2021-09-21

## 2021-09-21 ENCOUNTER — APPOINTMENT (OUTPATIENT)
Dept: HEMATOLOGY ONCOLOGY | Facility: CLINIC | Age: 61
End: 2021-09-21

## 2021-09-21 LAB
BASOPHILS # BLD AUTO: 0 K/UL — SIGNIFICANT CHANGE UP (ref 0–0.2)
BASOPHILS NFR BLD AUTO: 0 % — SIGNIFICANT CHANGE UP (ref 0–2)
EOSINOPHIL # BLD AUTO: 0.09 K/UL — SIGNIFICANT CHANGE UP (ref 0–0.5)
EOSINOPHIL NFR BLD AUTO: 1 % — SIGNIFICANT CHANGE UP (ref 0–6)
HCT VFR BLD CALC: 25.5 % — LOW (ref 34.5–45)
HGB BLD-MCNC: 8.2 G/DL — LOW (ref 11.5–15.5)
LYMPHOCYTES # BLD AUTO: 1.28 K/UL — SIGNIFICANT CHANGE UP (ref 1–3.3)
LYMPHOCYTES # BLD AUTO: 14 % — SIGNIFICANT CHANGE UP (ref 13–44)
MCHC RBC-ENTMCNC: 29.9 PG — SIGNIFICANT CHANGE UP (ref 27–34)
MCHC RBC-ENTMCNC: 32.2 G/DL — SIGNIFICANT CHANGE UP (ref 32–36)
MCV RBC AUTO: 93.1 FL — SIGNIFICANT CHANGE UP (ref 80–100)
METAMYELOCYTES # FLD: 1 % — HIGH (ref 0–0)
MONOCYTES # BLD AUTO: 0.27 K/UL — SIGNIFICANT CHANGE UP (ref 0–0.9)
MONOCYTES NFR BLD AUTO: 3 % — SIGNIFICANT CHANGE UP (ref 2–14)
NEUTROPHILS # BLD AUTO: 7.4 K/UL — SIGNIFICANT CHANGE UP (ref 1.8–7.4)
NEUTROPHILS NFR BLD AUTO: 81 % — HIGH (ref 43–77)
NRBC # BLD: 5 /100 — HIGH (ref 0–0)
NRBC # BLD: SIGNIFICANT CHANGE UP /100 WBCS (ref 0–0)
PLAT MORPH BLD: NORMAL — SIGNIFICANT CHANGE UP
PLATELET # BLD AUTO: 127 K/UL — LOW (ref 150–400)
RBC # BLD: 2.74 M/UL — LOW (ref 3.8–5.2)
RBC # FLD: 22.2 % — HIGH (ref 10.3–14.5)
RBC BLD AUTO: SIGNIFICANT CHANGE UP
WBC # BLD: 9.14 K/UL — SIGNIFICANT CHANGE UP (ref 3.8–10.5)
WBC # FLD AUTO: 9.14 K/UL — SIGNIFICANT CHANGE UP (ref 3.8–10.5)

## 2021-09-23 ENCOUNTER — APPOINTMENT (OUTPATIENT)
Dept: HEMATOLOGY ONCOLOGY | Facility: CLINIC | Age: 61
End: 2021-09-23
Payer: COMMERCIAL

## 2021-09-23 ENCOUNTER — NON-APPOINTMENT (OUTPATIENT)
Age: 61
End: 2021-09-23

## 2021-09-23 VITALS
OXYGEN SATURATION: 100 % | HEART RATE: 78 BPM | RESPIRATION RATE: 16 BRPM | WEIGHT: 115.74 LBS | BODY MASS INDEX: 21.87 KG/M2 | DIASTOLIC BLOOD PRESSURE: 78 MMHG | TEMPERATURE: 98 F | SYSTOLIC BLOOD PRESSURE: 123 MMHG

## 2021-09-23 PROCEDURE — 99215 OFFICE O/P EST HI 40 MIN: CPT

## 2021-09-23 NOTE — ASSESSMENT
[FreeTextEntry1] : This is a 58 woman with a history of Polycythemia Vera, MAK 2 + since 2012. 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  She is now under my super vision for the P. Vera.  Patient had a normal hemoglobin on last follow up in 2019. Hg 12.5g/dl, was unable to explain why it was suddenly normal at the time.  More recently admitted for abdominal pain  secondary to splenomegaly with splenic infarcts.  This had responded very well to the use of hydroxyurea. Hg came up with treatment up to 9.9 at one point, pain from splenomegaly decreased significantly though patient was feeling left upper quadrant pain.  After a month of Jakafi 15mg following dialysis 3 times a week, patient abdominal pain and left upper quadrant pain has resolved, however she became weak and anemic requiring transfusion at the hospital.  Patient now discharged. still feeling weak.  Explained to patient that this could have been progression of the myelofibrosis but could also be from  the Jakafi 15mg which causes anemia as a side effect.  After July admission, Patient was transitioned to Jakafi 10mg, patient was recently re-admitted with fevers, unclear source.    She was discharged in the past 3 weeks and is stil suffering form intermittent subjective  fevers. Abdominal pain particularly in the left upepr quadrant had returned somewhat.  \par \par Looked into the various CT scans that we had done in the past year. IN January, spleen in its largest caudal/cranial dimension.  Was previously 21cm in January, however after Jackafi was started  improved to 17cm, and now measures 19cm.  bin.   The Jakafi was started in June prior to the 17cm scan and decreased to 10mg TIW before the finding of the 19 cm spleen . explained to patient that it does appear that her splenomegaly and organomegaly was clearly doing better on the Jakafi 15mg dose, however she became too anemic as a side effect of her medication, as well as natural complications of the renal insufficiency and the myelofibrosis .We were forced to cut down to the 10mg TIW dose.  Would like her to start danazol 200mg BID  for optimization of her Hg on the Jakafi 10mg TIW dose after dialysis.  When she takes the danazol for a few months would expect Hg to improved and we can consider

## 2021-09-23 NOTE — HISTORY OF PRESENT ILLNESS
[de-identified] : This is a 61 year old woman with a history of MAK 2 + polycythemia vera.   diagnosed in 2012. 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 Hollie Guzmán in [place] (# [phone]). She has had a bone marrow biopsy done at the time of diagnosis.  Was previously non complaint with therapy there, however has been quite complaint with the hydroxyurea since coming to [facility] fellows clinic here in 2017.  Patient now follows with Dr. Jacky Guo after closure of the fellows clinic.  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 Hematology was consulted and recommended phlebotomy, but patient refused. She was discharged on Hydrea 500mg on HD days.  which she is still on. As of 9/27/19 she has transferred from the hematology fellows clinic to my service at [facility].  \par \par \par  [de-identified] : Daniel [id number]\par Patient still experiencing fevers no localizing symptoms this was after discharge form hospital.  \par Patient complain that her spleen is enlarging though on exam i suspect its actually regressed a little bit since the last time I recall examining her.  During course o  hospital patient was transfused again for Hg i t he 7's.

## 2021-09-29 ENCOUNTER — RESULT REVIEW (OUTPATIENT)
Age: 61
End: 2021-09-29

## 2021-09-29 ENCOUNTER — NON-APPOINTMENT (OUTPATIENT)
Age: 61
End: 2021-09-29

## 2021-09-29 ENCOUNTER — APPOINTMENT (OUTPATIENT)
Dept: HEMATOLOGY ONCOLOGY | Facility: CLINIC | Age: 61
End: 2021-09-29

## 2021-09-29 ENCOUNTER — OUTPATIENT (OUTPATIENT)
Dept: OUTPATIENT SERVICES | Facility: HOSPITAL | Age: 61
LOS: 1 days | End: 2021-09-29

## 2021-09-29 DIAGNOSIS — T85.898A OTHER SPECIFIED COMPLICATION OF OTHER INTERNAL PROSTHETIC DEVICES, IMPLANTS AND GRAFTS, INITIAL ENCOUNTER: Chronic | ICD-10-CM

## 2021-09-29 DIAGNOSIS — I77.0 ARTERIOVENOUS FISTULA, ACQUIRED: Chronic | ICD-10-CM

## 2021-09-29 DIAGNOSIS — D75.81 MYELOFIBROSIS: ICD-10-CM

## 2021-09-29 DIAGNOSIS — Z99.2 DEPENDENCE ON RENAL DIALYSIS: Chronic | ICD-10-CM

## 2021-09-29 LAB
BASOPHILS # BLD AUTO: 0.13 K/UL — SIGNIFICANT CHANGE UP (ref 0–0.2)
BASOPHILS NFR BLD AUTO: 2 % — SIGNIFICANT CHANGE UP (ref 0–2)
EOSINOPHIL # BLD AUTO: 0.32 K/UL — SIGNIFICANT CHANGE UP (ref 0–0.5)
EOSINOPHIL NFR BLD AUTO: 5 % — SIGNIFICANT CHANGE UP (ref 0–6)
HCT VFR BLD CALC: 22.4 % — LOW (ref 34.5–45)
HGB BLD-MCNC: 6.8 G/DL — CRITICAL LOW (ref 11.5–15.5)
LYMPHOCYTES # BLD AUTO: 0.77 K/UL — LOW (ref 1–3.3)
LYMPHOCYTES # BLD AUTO: 12 % — LOW (ref 13–44)
MCHC RBC-ENTMCNC: 30.1 PG — SIGNIFICANT CHANGE UP (ref 27–34)
MCHC RBC-ENTMCNC: 30.4 G/DL — LOW (ref 32–36)
MCV RBC AUTO: 99.1 FL — SIGNIFICANT CHANGE UP (ref 80–100)
METAMYELOCYTES # FLD: 2 % — HIGH (ref 0–0)
MONOCYTES # BLD AUTO: 0.06 K/UL — SIGNIFICANT CHANGE UP (ref 0–0.9)
MONOCYTES NFR BLD AUTO: 1 % — LOW (ref 2–14)
MYELOCYTES NFR BLD: 3 % — HIGH (ref 0–0)
NEUTROPHILS # BLD AUTO: 4.74 K/UL — SIGNIFICANT CHANGE UP (ref 1.8–7.4)
NEUTROPHILS NFR BLD AUTO: 74 % — SIGNIFICANT CHANGE UP (ref 43–77)
NRBC # BLD: 3 /100 — HIGH (ref 0–0)
NRBC # BLD: SIGNIFICANT CHANGE UP /100 WBCS (ref 0–0)
PLAT MORPH BLD: NORMAL — SIGNIFICANT CHANGE UP
PLATELET # BLD AUTO: 101 K/UL — LOW (ref 150–400)
POLYCHROMASIA BLD QL SMEAR: SLIGHT — SIGNIFICANT CHANGE UP
PROMYELOCYTES # FLD: 1 % — HIGH (ref 0–0)
RBC # BLD: 2.26 M/UL — LOW (ref 3.8–5.2)
RBC # FLD: 22.9 % — HIGH (ref 10.3–14.5)
RBC BLD AUTO: SIGNIFICANT CHANGE UP
WBC # BLD: 6.4 K/UL — SIGNIFICANT CHANGE UP (ref 3.8–10.5)
WBC # FLD AUTO: 6.4 K/UL — SIGNIFICANT CHANGE UP (ref 3.8–10.5)

## 2021-09-30 ENCOUNTER — INPATIENT (INPATIENT)
Facility: HOSPITAL | Age: 61
LOS: 0 days | Discharge: HOME | DRG: 811 | End: 2021-10-01
Attending: INTERNAL MEDICINE | Admitting: INTERNAL MEDICINE
Payer: MEDICAID

## 2021-09-30 ENCOUNTER — APPOINTMENT (OUTPATIENT)
Dept: INFUSION THERAPY | Facility: HOSPITAL | Age: 61
End: 2021-09-30

## 2021-09-30 VITALS
HEART RATE: 94 BPM | HEIGHT: 62 IN | WEIGHT: 116.84 LBS | SYSTOLIC BLOOD PRESSURE: 139 MMHG | DIASTOLIC BLOOD PRESSURE: 88 MMHG | RESPIRATION RATE: 16 BRPM

## 2021-09-30 DIAGNOSIS — D64.9 ANEMIA, UNSPECIFIED: ICD-10-CM

## 2021-09-30 DIAGNOSIS — T85.898A OTHER SPECIFIED COMPLICATION OF OTHER INTERNAL PROSTHETIC DEVICES, IMPLANTS AND GRAFTS, INITIAL ENCOUNTER: Chronic | ICD-10-CM

## 2021-09-30 DIAGNOSIS — I77.0 ARTERIOVENOUS FISTULA, ACQUIRED: Chronic | ICD-10-CM

## 2021-09-30 DIAGNOSIS — Z99.2 DEPENDENCE ON RENAL DIALYSIS: Chronic | ICD-10-CM

## 2021-09-30 LAB
ALBUMIN SERPL ELPH-MCNC: 4.4 G/DL — SIGNIFICANT CHANGE UP (ref 3.3–5)
ALP SERPL-CCNC: 147 U/L — HIGH (ref 40–120)
ALT FLD-CCNC: 10 U/L — SIGNIFICANT CHANGE UP (ref 10–45)
ANION GAP SERPL CALC-SCNC: 15 MMOL/L — SIGNIFICANT CHANGE UP (ref 5–17)
AST SERPL-CCNC: 24 U/L — SIGNIFICANT CHANGE UP (ref 10–40)
BASE EXCESS BLDV CALC-SCNC: 0.2 MMOL/L — SIGNIFICANT CHANGE UP (ref -2–2)
BILIRUB SERPL-MCNC: 3 MG/DL — HIGH (ref 0.2–1.2)
BLD GP AB SCN SERPL QL: POSITIVE — SIGNIFICANT CHANGE UP
BUN SERPL-MCNC: 26 MG/DL — HIGH (ref 7–23)
CA-I SERPL-SCNC: 1.07 MMOL/L — LOW (ref 1.15–1.33)
CALCIUM SERPL-MCNC: 8.4 MG/DL — SIGNIFICANT CHANGE UP (ref 8.4–10.5)
CHLORIDE BLDV-SCNC: 100 MMOL/L — SIGNIFICANT CHANGE UP (ref 96–108)
CHLORIDE SERPL-SCNC: 96 MMOL/L — SIGNIFICANT CHANGE UP (ref 96–108)
CO2 BLDV-SCNC: 27 MMOL/L — HIGH (ref 22–26)
CO2 SERPL-SCNC: 23 MMOL/L — SIGNIFICANT CHANGE UP (ref 22–31)
CREAT SERPL-MCNC: 4.37 MG/DL — HIGH (ref 0.5–1.3)
DAT C3-SP REAG RBC QL: NEGATIVE — SIGNIFICANT CHANGE UP
ELUATE ANTIBODY 1: SIGNIFICANT CHANGE UP
FERRITIN SERPL-MCNC: 998 NG/ML
GAS PNL BLDV: 136 MMOL/L — SIGNIFICANT CHANGE UP (ref 136–145)
GAS PNL BLDV: SIGNIFICANT CHANGE UP
GAS PNL BLDV: SIGNIFICANT CHANGE UP
GLUCOSE BLDV-MCNC: 150 MG/DL — HIGH (ref 70–99)
GLUCOSE SERPL-MCNC: 154 MG/DL — HIGH (ref 70–99)
HCO3 BLDV-SCNC: 26 MMOL/L — SIGNIFICANT CHANGE UP (ref 22–29)
HCT VFR BLD CALC: 23.4 % — LOW (ref 34.5–45)
HCT VFR BLDA CALC: 22 % — LOW (ref 34.5–46.5)
HGB BLD CALC-MCNC: 7.4 G/DL — LOW (ref 11.7–16.1)
HGB BLD-MCNC: 7 G/DL — CRITICAL LOW (ref 11.5–15.5)
MCHC RBC-ENTMCNC: 29 PG — SIGNIFICANT CHANGE UP (ref 27–34)
MCHC RBC-ENTMCNC: 29.9 GM/DL — LOW (ref 32–36)
MCV RBC AUTO: 97.1 FL — SIGNIFICANT CHANGE UP (ref 80–100)
PCO2 BLDV: 47 MMHG — HIGH (ref 39–42)
PH BLDV: 7.35 — SIGNIFICANT CHANGE UP (ref 7.32–7.43)
PLATELET # BLD AUTO: 129 K/UL — LOW (ref 150–400)
PO2 BLDV: 30 MMHG — SIGNIFICANT CHANGE UP (ref 25–45)
POTASSIUM BLDV-SCNC: 4.5 MMOL/L — SIGNIFICANT CHANGE UP (ref 3.5–5.1)
POTASSIUM SERPL-MCNC: 4.7 MMOL/L — SIGNIFICANT CHANGE UP (ref 3.5–5.3)
POTASSIUM SERPL-SCNC: 4.7 MMOL/L — SIGNIFICANT CHANGE UP (ref 3.5–5.3)
PROT SERPL-MCNC: 7.6 G/DL — SIGNIFICANT CHANGE UP (ref 6–8.3)
RBC # BLD: 2.41 M/UL — LOW (ref 3.8–5.2)
RBC # FLD: 22.6 % — HIGH (ref 10.3–14.5)
RH IG SCN BLD-IMP: POSITIVE — SIGNIFICANT CHANGE UP
SAO2 % BLDV: 48.7 % — LOW (ref 67–88)
SODIUM SERPL-SCNC: 134 MMOL/L — LOW (ref 135–145)
WBC # BLD: 6.7 K/UL — SIGNIFICANT CHANGE UP (ref 3.8–10.5)
WBC # FLD AUTO: 6.7 K/UL — SIGNIFICANT CHANGE UP (ref 3.8–10.5)

## 2021-09-30 PROCEDURE — 99284 EMERGENCY DEPT VISIT MOD MDM: CPT | Mod: 25

## 2021-09-30 PROCEDURE — 71045 X-RAY EXAM CHEST 1 VIEW: CPT | Mod: 26

## 2021-09-30 PROCEDURE — 36000 PLACE NEEDLE IN VEIN: CPT

## 2021-09-30 NOTE — ED ADULT NURSE REASSESSMENT NOTE - NS ED NURSE REASSESS COMMENT FT1
DARREN BRASWELL received a critical lab value of 7.0 HGB by  Neymar Gamino. MD Bahena made aware and does not wish to do anything at this time.

## 2021-09-30 NOTE — ED ADULT TRIAGE NOTE - CHIEF COMPLAINT QUOTE
low hemoglobin, sent for blood transfusion  c/o shortness of breath with exertion  receives dialysis Monday, Wednesday and Friday- last session was yesterday and completed entire session

## 2021-09-30 NOTE — ED PROVIDER NOTE - OBJECTIVE STATEMENT
61 year old female with history of ESRD on HD (MWF), myelofibrosis, HTN, presenting with anemia. 61 year old female with history of ESRD on HD (MWF), myelofibrosis, HTN, presenting with anemia. States being sent in by hematologist Dr. Jacky Guo for transfusion, outpatient hgb yesterday 6.8. Endorses worse than usual SOB but otherwise denies CP, lightheadedness, vision changes, N/V/D, fevers, chills, syncope. Full HD session to L AVF yesterday. Has chronic intermittent abdominal pain 2/2 to ?splenomegaly.

## 2021-09-30 NOTE — ED PROVIDER NOTE - CLINICAL SUMMARY MEDICAL DECISION MAKING FREE TEXT BOX
61 year old female with history of ESRD on HD (MWF), myelofibrosis, HTN, presenting with anemia. Appears well here, afebrile, VS wnl stable, exam benign. Anticipate admission for transfusion during HD tomorrow. No indication for emergent imaging. Labs, ekg, t/s x 3 per blood bank request 61 year old female with history of ESRD on HD (MWF), myelofibrosis, HTN, presenting with anemia. Appears well here, afebrile, VS wnl stable, exam benign. Anticipate admission for transfusion during HD tomorrow. No indication for emergent imaging. Labs, ekg, t/s x 3 per blood bank request    AUSTIN Bahena MD: Agree with resident MDM, assessment and plan as above.

## 2021-09-30 NOTE — ED PROVIDER NOTE - PHYSICAL EXAMINATION
Gen - NAD; well-appearing; A+Ox3   HEENT - NCAT, EOMI, moist mucous membranes  Neck - supple  Resp - CTAB, no increased WOB  CV -  RRR  Abd - soft, NT, ND; no guarding or rebound  MSK - 5/5 strength and FROM b/l UE and LE  Extrem - no LE edema/erythema/tenderness  Neuro - no focal motor or sensation deficits

## 2021-10-01 ENCOUNTER — TRANSCRIPTION ENCOUNTER (OUTPATIENT)
Age: 61
End: 2021-10-01

## 2021-10-01 VITALS
DIASTOLIC BLOOD PRESSURE: 76 MMHG | TEMPERATURE: 98 F | SYSTOLIC BLOOD PRESSURE: 100 MMHG | HEART RATE: 100 BPM | RESPIRATION RATE: 18 BRPM | OXYGEN SATURATION: 99 %

## 2021-10-01 DIAGNOSIS — R73.9 HYPERGLYCEMIA, UNSPECIFIED: ICD-10-CM

## 2021-10-01 DIAGNOSIS — Z02.9 ENCOUNTER FOR ADMINISTRATIVE EXAMINATIONS, UNSPECIFIED: ICD-10-CM

## 2021-10-01 DIAGNOSIS — Z29.9 ENCOUNTER FOR PROPHYLACTIC MEASURES, UNSPECIFIED: ICD-10-CM

## 2021-10-01 DIAGNOSIS — D45 POLYCYTHEMIA VERA: ICD-10-CM

## 2021-10-01 DIAGNOSIS — D64.9 ANEMIA, UNSPECIFIED: ICD-10-CM

## 2021-10-01 DIAGNOSIS — N18.6 END STAGE RENAL DISEASE: ICD-10-CM

## 2021-10-01 LAB
ANISOCYTOSIS BLD QL: SLIGHT — SIGNIFICANT CHANGE UP
ANISOCYTOSIS BLD QL: SLIGHT — SIGNIFICANT CHANGE UP
BASOPHILS # BLD AUTO: 0 K/UL — SIGNIFICANT CHANGE UP (ref 0–0.2)
BASOPHILS # BLD AUTO: 0.06 K/UL — SIGNIFICANT CHANGE UP (ref 0–0.2)
BASOPHILS NFR BLD AUTO: 0 % — SIGNIFICANT CHANGE UP (ref 0–2)
BASOPHILS NFR BLD AUTO: 0.9 % — SIGNIFICANT CHANGE UP (ref 0–2)
BLD GP AB SCN SERPL QL: POSITIVE — SIGNIFICANT CHANGE UP
DACRYOCYTES BLD QL SMEAR: SLIGHT — SIGNIFICANT CHANGE UP
DACRYOCYTES BLD QL SMEAR: SLIGHT — SIGNIFICANT CHANGE UP
ELLIPTOCYTES BLD QL SMEAR: SIGNIFICANT CHANGE UP
EOSINOPHIL # BLD AUTO: 0.23 K/UL — SIGNIFICANT CHANGE UP (ref 0–0.5)
EOSINOPHIL # BLD AUTO: 0.47 K/UL — SIGNIFICANT CHANGE UP (ref 0–0.5)
EOSINOPHIL NFR BLD AUTO: 3.5 % — SIGNIFICANT CHANGE UP (ref 0–6)
EOSINOPHIL NFR BLD AUTO: 7 % — HIGH (ref 0–6)
HCT VFR BLD CALC: 21 % — CRITICAL LOW (ref 34.5–45)
HCT VFR BLD CALC: 27.1 % — LOW (ref 34.5–45)
HGB BLD-MCNC: 6.3 G/DL — CRITICAL LOW (ref 11.5–15.5)
HGB BLD-MCNC: 8.5 G/DL — LOW (ref 11.5–15.5)
LACTATE BLDV-MCNC: 2.4 MMOL/L — HIGH (ref 0.7–2)
LYMPHOCYTES # BLD AUTO: 0.7 K/UL — LOW (ref 1–3.3)
LYMPHOCYTES # BLD AUTO: 1.12 K/UL — SIGNIFICANT CHANGE UP (ref 1–3.3)
LYMPHOCYTES # BLD AUTO: 10.5 % — LOW (ref 13–44)
LYMPHOCYTES # BLD AUTO: 17.4 % — SIGNIFICANT CHANGE UP (ref 13–44)
MANUAL SMEAR VERIFICATION: SIGNIFICANT CHANGE UP
MANUAL SMEAR VERIFICATION: SIGNIFICANT CHANGE UP
MCHC RBC-ENTMCNC: 29.3 PG — SIGNIFICANT CHANGE UP (ref 27–34)
MCHC RBC-ENTMCNC: 29.4 PG — SIGNIFICANT CHANGE UP (ref 27–34)
MCHC RBC-ENTMCNC: 30 GM/DL — LOW (ref 32–36)
MCHC RBC-ENTMCNC: 31.4 GM/DL — LOW (ref 32–36)
MCV RBC AUTO: 93.4 FL — SIGNIFICANT CHANGE UP (ref 80–100)
MCV RBC AUTO: 98.1 FL — SIGNIFICANT CHANGE UP (ref 80–100)
METAMYELOCYTES # FLD: 0.9 % — HIGH (ref 0–0)
METAMYELOCYTES # FLD: 1.8 % — HIGH (ref 0–0)
MONOCYTES # BLD AUTO: 0.11 K/UL — SIGNIFICANT CHANGE UP (ref 0–0.9)
MONOCYTES # BLD AUTO: 0.23 K/UL — SIGNIFICANT CHANGE UP (ref 0–0.9)
MONOCYTES NFR BLD AUTO: 1.7 % — LOW (ref 2–14)
MONOCYTES NFR BLD AUTO: 3.5 % — SIGNIFICANT CHANGE UP (ref 2–14)
MYELOCYTES NFR BLD: 4.3 % — HIGH (ref 0–0)
MYELOCYTES NFR BLD: 7 % — HIGH (ref 0–0)
NEUTROPHILS # BLD AUTO: 4.64 K/UL — SIGNIFICANT CHANGE UP (ref 1.8–7.4)
NEUTROPHILS # BLD AUTO: 4.64 K/UL — SIGNIFICANT CHANGE UP (ref 1.8–7.4)
NEUTROPHILS NFR BLD AUTO: 62.3 % — SIGNIFICANT CHANGE UP (ref 43–77)
NEUTROPHILS NFR BLD AUTO: 68.7 % — SIGNIFICANT CHANGE UP (ref 43–77)
NEUTS BAND # BLD: 3.5 % — SIGNIFICANT CHANGE UP (ref 0–8)
NEUTS BAND # BLD: 7 % — SIGNIFICANT CHANGE UP (ref 0–8)
NRBC # BLD: 3 /100 — HIGH (ref 0–0)
NRBC # BLD: 4 /100 WBCS — HIGH (ref 0–0)
NRBC # BLD: 4 /100 — HIGH (ref 0–0)
OVALOCYTES BLD QL SMEAR: SLIGHT — SIGNIFICANT CHANGE UP
PLAT MORPH BLD: NORMAL — SIGNIFICANT CHANGE UP
PLAT MORPH BLD: NORMAL — SIGNIFICANT CHANGE UP
PLATELET # BLD AUTO: 113 K/UL — LOW (ref 150–400)
PLATELET # BLD AUTO: 132 K/UL — LOW (ref 150–400)
POIKILOCYTOSIS BLD QL AUTO: SLIGHT — SIGNIFICANT CHANGE UP
POIKILOCYTOSIS BLD QL AUTO: SLIGHT — SIGNIFICANT CHANGE UP
POLYCHROMASIA BLD QL SMEAR: SLIGHT — SIGNIFICANT CHANGE UP
POLYCHROMASIA BLD QL SMEAR: SLIGHT — SIGNIFICANT CHANGE UP
RBC # BLD: 2.14 M/UL — LOW (ref 3.8–5.2)
RBC # BLD: 2.9 M/UL — LOW (ref 3.8–5.2)
RBC # FLD: 20.7 % — HIGH (ref 10.3–14.5)
RBC # FLD: 22.5 % — HIGH (ref 10.3–14.5)
RBC BLD AUTO: ABNORMAL
RBC BLD AUTO: SIGNIFICANT CHANGE UP
RH IG SCN BLD-IMP: POSITIVE — SIGNIFICANT CHANGE UP
SARS-COV-2 RNA SPEC QL NAA+PROBE: SIGNIFICANT CHANGE UP
SCHISTOCYTES BLD QL AUTO: SLIGHT — SIGNIFICANT CHANGE UP
WBC # BLD: 6.43 K/UL — SIGNIFICANT CHANGE UP (ref 3.8–10.5)
WBC # BLD: 8.21 K/UL — SIGNIFICANT CHANGE UP (ref 3.8–10.5)
WBC # FLD AUTO: 6.43 K/UL — SIGNIFICANT CHANGE UP (ref 3.8–10.5)
WBC # FLD AUTO: 8.21 K/UL — SIGNIFICANT CHANGE UP (ref 3.8–10.5)

## 2021-10-01 PROCEDURE — U0005: CPT

## 2021-10-01 PROCEDURE — 84132 ASSAY OF SERUM POTASSIUM: CPT

## 2021-10-01 PROCEDURE — 99261: CPT

## 2021-10-01 PROCEDURE — 82565 ASSAY OF CREATININE: CPT

## 2021-10-01 PROCEDURE — 99223 1ST HOSP IP/OBS HIGH 75: CPT

## 2021-10-01 PROCEDURE — 86922 COMPATIBILITY TEST ANTIGLOB: CPT

## 2021-10-01 PROCEDURE — 99254 IP/OBS CNSLTJ NEW/EST MOD 60: CPT | Mod: GC

## 2021-10-01 PROCEDURE — 86880 COOMBS TEST DIRECT: CPT

## 2021-10-01 PROCEDURE — 85014 HEMATOCRIT: CPT

## 2021-10-01 PROCEDURE — 84295 ASSAY OF SERUM SODIUM: CPT

## 2021-10-01 PROCEDURE — 82947 ASSAY GLUCOSE BLOOD QUANT: CPT

## 2021-10-01 PROCEDURE — 80053 COMPREHEN METABOLIC PANEL: CPT

## 2021-10-01 PROCEDURE — 86850 RBC ANTIBODY SCREEN: CPT

## 2021-10-01 PROCEDURE — 99285 EMERGENCY DEPT VISIT HI MDM: CPT

## 2021-10-01 PROCEDURE — P9040: CPT

## 2021-10-01 PROCEDURE — 83036 HEMOGLOBIN GLYCOSYLATED A1C: CPT

## 2021-10-01 PROCEDURE — 82803 BLOOD GASES ANY COMBINATION: CPT

## 2021-10-01 PROCEDURE — 86902 BLOOD TYPE ANTIGEN DONOR EA: CPT

## 2021-10-01 PROCEDURE — 96374 THER/PROPH/DIAG INJ IV PUSH: CPT

## 2021-10-01 PROCEDURE — 96375 TX/PRO/DX INJ NEW DRUG ADDON: CPT

## 2021-10-01 PROCEDURE — U0003: CPT

## 2021-10-01 PROCEDURE — 86900 BLOOD TYPING SEROLOGIC ABO: CPT

## 2021-10-01 PROCEDURE — 85025 COMPLETE CBC W/AUTO DIFF WBC: CPT

## 2021-10-01 PROCEDURE — 85018 HEMOGLOBIN: CPT

## 2021-10-01 PROCEDURE — 86078 PHYS BLOOD BANK SERV REACTJ: CPT

## 2021-10-01 PROCEDURE — 85027 COMPLETE CBC AUTOMATED: CPT

## 2021-10-01 PROCEDURE — 86077 PHYS BLOOD BANK SERV XMATCH: CPT

## 2021-10-01 PROCEDURE — 86860 RBC ANTIBODY ELUTION: CPT

## 2021-10-01 PROCEDURE — 83605 ASSAY OF LACTIC ACID: CPT

## 2021-10-01 PROCEDURE — 86870 RBC ANTIBODY IDENTIFICATION: CPT

## 2021-10-01 PROCEDURE — 82330 ASSAY OF CALCIUM: CPT

## 2021-10-01 PROCEDURE — 36415 COLL VENOUS BLD VENIPUNCTURE: CPT

## 2021-10-01 PROCEDURE — 71045 X-RAY EXAM CHEST 1 VIEW: CPT

## 2021-10-01 PROCEDURE — 86901 BLOOD TYPING SEROLOGIC RH(D): CPT

## 2021-10-01 PROCEDURE — 36430 TRANSFUSION BLD/BLD COMPNT: CPT

## 2021-10-01 PROCEDURE — 82435 ASSAY OF BLOOD CHLORIDE: CPT

## 2021-10-01 PROCEDURE — 93005 ELECTROCARDIOGRAM TRACING: CPT

## 2021-10-01 RX ORDER — CALCIUM ACETATE 667 MG
1334 TABLET ORAL
Refills: 0 | Status: DISCONTINUED | OUTPATIENT
Start: 2021-10-01 | End: 2021-10-01

## 2021-10-01 RX ORDER — GABAPENTIN 400 MG/1
1 CAPSULE ORAL
Qty: 0 | Refills: 0 | DISCHARGE

## 2021-10-01 RX ORDER — LANOLIN ALCOHOL/MO/W.PET/CERES
3 CREAM (GRAM) TOPICAL AT BEDTIME
Refills: 0 | Status: DISCONTINUED | OUTPATIENT
Start: 2021-10-01 | End: 2021-10-01

## 2021-10-01 RX ORDER — INFLUENZA VIRUS VACCINE 15; 15; 15; 15 UG/.5ML; UG/.5ML; UG/.5ML; UG/.5ML
0.5 SUSPENSION INTRAMUSCULAR ONCE
Refills: 0 | Status: DISCONTINUED | OUTPATIENT
Start: 2021-10-01 | End: 2021-10-01

## 2021-10-01 RX ORDER — ERYTHROPOIETIN 10000 [IU]/ML
20000 INJECTION, SOLUTION INTRAVENOUS; SUBCUTANEOUS
Refills: 0 | Status: DISCONTINUED | OUTPATIENT
Start: 2021-10-01 | End: 2021-10-01

## 2021-10-01 RX ORDER — LANOLIN ALCOHOL/MO/W.PET/CERES
3 CREAM (GRAM) TOPICAL ONCE
Refills: 0 | Status: COMPLETED | OUTPATIENT
Start: 2021-10-01 | End: 2021-10-01

## 2021-10-01 RX ADMIN — Medication 1 TABLET(S): at 14:37

## 2021-10-01 RX ADMIN — Medication 1334 MILLIGRAM(S): at 17:48

## 2021-10-01 RX ADMIN — ERYTHROPOIETIN 20000 UNIT(S): 10000 INJECTION, SOLUTION INTRAVENOUS; SUBCUTANEOUS at 12:32

## 2021-10-01 RX ADMIN — Medication 3 MILLIGRAM(S): at 01:21

## 2021-10-01 RX ADMIN — Medication 1334 MILLIGRAM(S): at 14:38

## 2021-10-01 NOTE — DISCHARGE NOTE PROVIDER - REASON FOR ADMISSION
Sent in by Dr. Guo for low H/H. one unit pack RBC received  resolved Sent in by Dr. Guo for low H/H.

## 2021-10-01 NOTE — H&P ADULT - PROBLEM SELECTOR PLAN 1
See above.  Anticipated blood transfusion with early HD for this AM.   Would consider formal renal and hematology evaluation.

## 2021-10-01 NOTE — H&P ADULT - HISTORY OF PRESENT ILLNESS
NIGHT HOSPITALIST:    Patient UNKNOWN to me previously, assigned to me at this point via the ER and by Dr. Geiger to admit this 60 y/o F--followed by her physicians above--referred by Dr. Guo above following routine blood draw in the office with low H/H and sent to the ER for transfusion.   Patient with a history of non cirrhotic portal HTN with gastric varices, past COVID-19 infection in April 2021, ESRD on HD M/W/F, Jak2+ PV apparently with progression to secondary myelofibrosis in 2012 with patient maintained on Jakafi with complications of massive splenomegaly and history of splenic vein thrombosis in 2015 with recent DC on 9/10/21 for fevers with patient initially on IV antibiotics but seen by ID with no clear source of bacterial infection with antibiotics discontinued, with the patient seen by Dr. REX Guo in the office with routine blood draw with Hgb 7.0 (last Hgb 8.2 on 9/21/21).  Patient notes general fatigue for the past few days but denies dyspnoea.   Fatigue worsens with activity.  NO abdominal pain, no red blood per rectum or melena.  NO epistaxis.  NO vaginal bleeding.  Ho hematuria.  NO jaundice.    Patient with multiple antibodies from prior transfusions. NIGHT HOSPITALIST:    Patient UNKNOWN to me previously, assigned to me at this point via the ER and by Dr. Geiger to admit this 62 y/o F--followed by her physicians above--referred by Dr. Guo above following routine blood draw in the office with low H/H and sent to the ER for transfusion.   Patient with a history of non cirrhotic portal HTN with gastric varices, past COVID-19 infection in April 2021, ESRD on HD M/W/F, Jak2+ PV apparently with progression to secondary myelofibrosis in 2012 with patient maintained on Jakafi with complications of massive splenomegaly and history of splenic vein thrombosis in 2015 with recent DC on 9/10/21 for fevers with patient initially on IV antibiotics but seen by ID with no clear source of bacterial infection with antibiotics discontinued, with the patient seen by Dr. REX Guo in the office with routine blood draw with Hgb 7.0 (last Hgb 8.2 on 9/21/21).  Patient notes general fatigue for the past few days but denies dyspnoea.   Fatigue worsens with activity.  NO abdominal pain, no red blood per rectum or melena.  NO epistaxis.  NO vaginal bleeding.  Ho hematuria.  NO jaundice.    Patient with multiple antibodies from prior transfusions.    Patient reports receiving the Pfizer COVID-19 vaccine series after patient's SARS-CoV2 infection.

## 2021-10-01 NOTE — CONSULT NOTE ADULT - SUBJECTIVE AND OBJECTIVE BOX
[facility] DIVISION OF KIDNEY DISEASES AND HYPERTENSION -- [phone]  -- INITIAL CONSULT NOTE  --------------------------------------------------------------------------------  HPI:    Patient is a 61 y.o. F with a history of non cirrhotic portal HTN with gastric varices, past COVID-19 infection in April 2021, ESRD on HD M/W/F, Jak2+ PV apparently with progression to secondary myelofibrosis in 2012 with patient maintained on Jakafi with complications of massive splenomegaly and history of splenic vein thrombosis in 2015 with recent DC on 9/10/21 for fevers with patient initially on IV antibiotics but seen by ID with no clear source of bacterial infection with antibiotics discontinued, with the patient seen by Dr. REX Guo in the office with routine blood draw with Hgb 7.0 (last Hgb 8.2 on 9/21/21).  Patient notes general fatigue for the past few days but denies dyspnoea.   Fatigue worsens with activity.  NO abdominal pain, no red blood per rectum or melena.  NO epistaxis.  NO vaginal bleeding.  Ho hematuria.  NO jaundice.    Patient with multiple antibodies from prior transfusions.    Nephrology consulted for ESRD management. Pt. follows with Dr. De León at [facility]. Pt. to receive pRBC with dialysis today. Pt. currently denies and chest pain, shortness of breath, lightheadedness or dizziness. Pt. states she has follow up appointment next Tuesday with Vascular Surgery for balloonplasty because of narrowing of LUE AVF. Pt. states that she has appointment because of increased bleeding from access site however was abke to tolerate HD without difficulty on Wednesday.       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    Cirrhosis of liver without ascites, unspecified hepatic cirrhosis type    Pancreatic cyst    History of myelofibrosis    Peritoneal dialysis catheter in place  Placed 8/9/2017    Hemoperitoneum as complication of peritoneal dialysis  s/p removal 9/18    AV fistula  Placed 9/18      FAMILY HISTORY:  Family history of hypertension in mother    Family history of malignant neoplasm (Grandparent)  head and neck in father    FH: cirrhosis (Sibling)      PAST SOCIAL HISTORY:    ALLERGIES & MEDICATIONS  --------------------------------------------------------------------------------  Allergies    No Known Allergies    Intolerances      Standing Inpatient Medications  calcium acetate 1334 milliGRAM(s) Oral three times a day with meals  influenza   Vaccine 0.5 milliLiter(s) IntraMuscular once  Nephro-deonna 1 Tablet(s) Oral daily  propranolol 10 milliGRAM(s) Oral daily    PRN Inpatient Medications  melatonin 3 milliGRAM(s) Oral at bedtime PRN      REVIEW OF SYSTEMS  --------------------------------------------------------------------------------  Gen: No fevers/chills  Skin: No rashes  Head/Eyes/Ears: Normal hearing,   Respiratory: No dyspnea, cough  CV: No chest pain  GI: No abdominal pain, diarrhea  : No dysuria, hematuria  MSK: No  edema  Heme: No easy bruising or bleeding  Psych: No significant depression    All other systems were reviewed and are negative, except as noted.    VITALS/PHYSICAL EXAM  --------------------------------------------------------------------------------  T(C): 36.5 (10-01-21 @ 08:10), Max: 37.1 (09-30-21 @ 21:52)  HR: 89 (10-01-21 @ 08:10) (85 - 94)  BP: 138/83 (10-01-21 @ 08:10) (138/83 - 157/84)  RR: 16 (10-01-21 @ 08:10) (14 - 18)  SpO2: 100% (10-01-21 @ 08:10) (97% - 100%)  Wt(kg): --  Height (cm): 157.5 (09-30-21 @ 18:48)  Weight (kg): 53 (09-30-21 @ 18:48)  BMI (kg/m2): 21.4 (09-30-21 @ 18:48)  BSA (m2): 1.52 (09-30-21 @ 18:48)      Physical Exam:  	Gen: NAD  	HEENT: MMM  	Pulm: CTA B/L  	CV: S1S2  	Abd: Soft, +BS   	Ext: No LE edema B/L  	Neuro: Awake  	Skin: Warm and dry  	Vascular access: MARIETTA NIETO    LABS/STUDIES  --------------------------------------------------------------------------------              6.3    6.43  >-----------<  113      [10-01-21 @ 07:08]              21.0     134  |  96  |  26  ----------------------------<  154      [09-30-21 @ 22:50]  4.7   |  23  |  4.37        Ca     8.4     [09-30-21 @ 22:50]    TPro  7.6  /  Alb  4.4  /  TBili  3.0  /  DBili  x   /  AST  24  /  ALT  10  /  AlkPhos  147  [09-30-21 @ 22:50]    Creatinine Trend:  SCr 4.37 [09-30 @ 22:50]  SCr 5.81 [09-10 @ 07:19]  SCr 3.95 [09-09 @ 07:13]  SCr 5.39 [09-08 @ 07:19]  SCr 3.96 [09-07 @ 07:09]    Urinalysis - [01-09-21 @ 12:32]      Color Yellow / Appearance Slightly Turbid / SG 1.021 / pH 8.0      Gluc Trace / Ketone Trace  / Bili Negative / Urobili Negative       Blood Trace / Protein 300 mg/dL / Leuk Est Large / Nitrite Negative      RBC 1 /  / Hyaline 8 / Gran  / Sq Epi  / Non Sq Epi 18 / Bacteria Many      Iron 36, TIBC 152, %sat 23      [08-11-21 @ 09:35]  Ferritin 998      [08-11-21 @ 09:40]  HbA1c 4.9      [06-01-19 @ 00:46]  TSH 1.78      [09-08-21 @ 09:18]    HBsAb 9.5      [04-10-21 @ 23:35]  HBsAb Reactive      [07-29-21 @ 00:09]  HBsAg Nonreact      [07-29-21 @ 00:09]  HBcAb Nonreact      [07-29-21 @ 00:09]  HCV 0.19, Nonreact      [07-29-21 @ 00:09]  HIV Nonreact      [04-11-21 @ 08:31]    RAHUL: titer 1:80, pattern Homogeneous      [05-16-18 @ 22:43]  C3 Complement 100      [01-19-17 @ 20:01]  C4 Complement 32      [01-19-17 @ 20:01]  Rheumatoid Factor <7.0      [01-19-17 @ 20:01]  ANCA: cANCA Negative, pANCA Negative, atypical ANCA Negative      [01-19-17 @ 20:01]  anti-GBM <0.2      [01-20-17 @ 01:52]  ASLO 59      [01-19-17 @ 20:01]  Syphilis Screen (Treponema Pallidum Ab) Negative      [09-08-21 @ 09:08]  Free Light Chains: kappa 12.00, lambda 14.10, ratio = 0.85      [01-23 @ 14:39]  Immunofixation Serum:   No Monoclonal Band Identified      [01-23-17 @ 14:39]  SPEP Interpretation: Polyclonal Gammopathy      [01-23-17 @ 14:39]

## 2021-10-01 NOTE — DISCHARGE NOTE PROVIDER - NSDCFUSCHEDAPPT_GEN_ALL_CORE_FT
[facility], [facility]HYEON ; 10/05/2021 ; [facility] [address] [facility], MIHYEON ; 10/05/2021 ; [facility] [address] [facility], [place]HYEON ; 11/18/2021 ; [facility]

## 2021-10-01 NOTE — ED ADULT NURSE NOTE - OBJECTIVE STATEMENT
Pt is a 61y F PMH ESRD on HD (MWF), myelofibrosis, HTN, sent in by hematologist for low H&H. Pt with outpatient Hgb of 6.8, endorses chronic but worsening SOB. Reports chronic abdominal pain 2/2 to splenomegaly. Otherwise pt asymptomatic. Denies fevers, chills, chest pain, dizziness, syncope, palpitations. Pt received full HD treatment yesterday w/o complications. A&Ox4, RAHMAN, lungs clear, distal pulses intact, L AVF ++, abdomen softly distended, skin intact. Side rails up for safety, call bell and personal items within reach, instructed to call for assistance, verbalizes understanding. Will continue to monitor.

## 2021-10-01 NOTE — H&P ADULT - NSHPADDITIONALINFOADULT_GEN_ALL_CORE
NIGHT HOSPITALIST:    Patient aware of course and agrees with plan/care as above.   Given patient's comorbidities, patient's long term prognosis is guarded.   Emotional support provided to patient.   Care reviewed with covering NP/PA for endorsement to my physician colleagues.    Jhoan Owens MD  [phone] NIGHT HOSPITALIST:    Patient aware of course and agrees with plan/care as above.   Given patient's comorbidities, patient's long term prognosis is guarded.   Emotional support provided to patient.   Care reviewed with covering NP/PAMeagan for endorsement to Dr. Geiger.    Jhoan Owens MD  [phone]

## 2021-10-01 NOTE — CONSULT NOTE ADULT - ASSESSMENT
Patient is a 61 y.o. F with a history of non cirrhotic portal HTN with gastric varices, past COVID-19 infection in April 2021, ESRD on HD M/W/F, Jak2+ PV apparently with progression to secondary myelofibrosis in 2012 with patient maintained on Jakafi with complications of massive splenomegaly and history of splenic vein thrombosis in 2015 with recent DC on 9/10/21 for fevers with patient initially on IV antibiotics but seen by ID with no clear source of bacterial infection with antibiotics discontinued, with the patient seen by Dr. REX Guo in the office with routine blood draw with Hgb 7.0 (last Hgb 8.2 on 9/21/21).     Nephrology consulted for ESRD management. Pt. follows with Dr. De León at [facility]. Pt. to receive pRBC with dialysis today. Pt. currently denies and chest pain, shortness of breath, lightheadedness or dizziness. Pt. states she has follow up appointment next Tuesday with Vascular Surgery for balloonplasty because of narrowing of LUE AVF. Pt. states that she has appointment because of increased bleeding from access site however was able to tolerate HD without difficulty on Wednesday.     #ESRD  #Anemia   #Access  #BMD  Pt for HD today as on MWF schedule. Pt. for pRBC with HD today. Can check TSATs for Iron repletion.   c/w INDER   c/w Hecterol  c/w calcium acetate 1333 TID w/ meals   Please dose medications per eGFR.     If you have any questions, please feel free to contact me  Kota Ramey  Nephrology Fellow  [phone]  (After 5pm or on weekends please page the on-call fellow)

## 2021-10-01 NOTE — DISCHARGE NOTE PROVIDER - HOSPITAL COURSE
1 y.o. F with a history of non cirrhotic portal HTN with gastric varices, past COVID-19 infection in April 2021, ESRD on HD M/W/F, Jak2+ PV apparently with progression to secondary myelofibrosis in 2012 with patient maintained on Jakafi with complications of massive splenomegaly and history of splenic vein thrombosis in 2015 with recent DC on 9/10/21 for fevers with patient initially on IV antibiotics but seen by ID with no clear source of bacterial infection with antibiotics discontinued, with the patient seen by Dr. REX Guo in the office with routine blood draw with Hgb 7.0 (last Hgb 8.2 on 9/21/21).     Nephrology consulted for ESRD management. Pt. follows with Dr. De León at [facility]. Pt. to receive pRBC with dialysis today. Pt. currently denies and chest pain, shortness of breath, lightheadedness or dizziness. Pt. states she has follow up appointment next Tuesday with Vascular Surgery for balloonplasty because of narrowing of LUE AVF. Pt. states that she has appointment because of increased bleeding from access site however was able to tolerate HD without difficulty on Wednesday.     #ESRD  #Anemia  s/p one unit Prbc received during HD    Pt for HD today as on MWF schedule. Pt. for pRBC with HD today. Can check TSATs for Iron repletion.  now H & H improved    c/w Hecterol  c/w calcium acetate 1333 TID w/ meals           pateint is clear for discharge from [place] nephrology and DR Geiger   1 y 61 F with a history of non cirrhotic portal HTN with gastric varices, past COVID-19 infection in April 2021, ESRD on HD M/W/F, Jak2+ PV apparently with progression to secondary myelofibrosis in 2012 with patient maintained on Jakafi with complications of massive splenomegaly and history of splenic vein thrombosis in 2015 with recent DC on 9/10/21 for fevers with patient initially on IV antibiotics but seen by ID with no clear source of bacterial infection with antibiotics discontinued, with the patient seen by Dr. REX Guo in the office with routine blood draw with Hgb 7.0 (last Hgb 8.2 on 9/21/21).     Nephrology consulted for ESRD management. Pt. follows with Dr. De León at [facility]. Pt. to receive pRBC with dialysis today. Pt. currently denies and chest pain, shortness of breath, lightheadedness or dizziness. Pt. states she has follow up appointment next Tuesday with Vascular Surgery for balloonplasty because of narrowing of LUE AVF. Pt. states that she has appointment because of increased bleeding from access site however was able to tolerate HD without difficulty on Wednesday.     #ESRD  #Anemia  s/p one unit Prbc received during HD now HB 8.5    Pt for HD today as on MWF schedule. Pt. for pRBC with HD today. Can check TSATs for Iron repletion.  now H & H improved    c/w Hecterol  c/w calcium acetate 1333 TID w/ meals           pateint is clear for discharge from [place] nephrology and DR Geiger

## 2021-10-01 NOTE — PATIENT PROFILE ADULT - NSPROIMPLANTSMEDDEV_GEN_A_NUR
Therapy Daily Group Note


Patient Education Topic


Home Safety, Exercises





Exercises


LE Seated Exercise, Stretching, UE Exercise





Session


Ratio (pt:therapist):  4:1


Goal of Session:  Home Safety Strategies





Education on home safety techniques and strategies.


Functional strengthening to progress functional mobility


Goal Met for this Session:  Yes


Pt Benefit of Group:  Contributions to Others, Increased Functional Safety





Socialization; interactions with others who are experiencing time from


home; masks worn and social distancing practiced.





Other/Notes


Pt laughed and interacted appropriately during group setting and seemed to enjoy

the company of others.  Pt participated well the ther ex with assist as needed 

to correctly perform each exercise.


Start Time:  11:00


Stop Time:  12:00


Total Billed Treatment Time:  60


Total Billed Treatment


visit


GRP 60











BENTLEY PIERCE PT             Nov 26, 2020 12:18
None/Vascular stents/Clips

## 2021-10-01 NOTE — H&P ADULT - NSHPSOURCEINFOTX_GEN_ALL_CORE
Reviewed Medex with patient via patient recall.   Patient did not wish examiner to contact family at this hour.

## 2021-10-01 NOTE — H&P ADULT - NSHPLABSRESULTS_GEN_ALL_CORE
WBC 6.7    Hgb 7.0    Platelets of 129K.    K+ 4.7    Random glucose of 154.    Cr 4.3 pre HD    COVID-19 PCR>>sent.    Chest radiograph with RIGHT lower lobe nodular opacities appear consistent with prior COVID-19 infection.    EKG tracing reviewed with NSR at 92.

## 2021-10-01 NOTE — H&P ADULT - PROBLEM SELECTOR PLAN 6
Transitions of Care Status:  1.  Name of PCP:   Jacky Guo MD ([facility]) [phone]  2.  PCP Contacted on Admission: [x ] Y    [ ] N  by the ER physician.  3.  PCP contacted at Discharge: [ ] Y    [ ] N    [ ] N/A  4.  Post-Discharge Appointment Date and Location:  5.  Summary of Handoff given to PCP:

## 2021-10-01 NOTE — DISCHARGE NOTE PROVIDER - CARE PROVIDER_API CALL
Yael Aranda)  Nephrology  [address] [place] [address], [address] [place] [address]  Phone: [phone]  Fax: [phone]  Follow Up Time:

## 2021-10-01 NOTE — DISCHARGE NOTE PROVIDER - NSDCCPCAREPLAN_GEN_ALL_CORE_FT
PRINCIPAL DISCHARGE DIAGNOSIS  Diagnosis: Anemia  Assessment and Plan of Treatment: s/p one unit Pack rbc received during HD - resolved no blood in stool

## 2021-10-01 NOTE — DISCHARGE NOTE NURSING/CASE MANAGEMENT/SOCIAL WORK - NSDCPEFALRISK_GEN_ALL_CORE
For information on Fall & injury Prevention, visit [email or web address]

## 2021-10-01 NOTE — DISCHARGE NOTE PROVIDER - NSDCMRMEDTOKEN_GEN_ALL_CORE_FT
calcium acetate 667 mg oral capsule: 2 tab(s) orally 3 times a day  epoetin filiberto: 17621 unit(s) intravenous Monday, Wednesday, and Friday  Jakafi 10 mg oral tablet: 1 tab(s) orally 3 times a week after hemodialysis Monday, Wednesday, Friday  melatonin 3 mg oral tablet: 1 tab(s) orally once a day (at bedtime), As needed, Insomnia  Nephro-Ben oral tablet: 1 tab(s) orally once a day  propranolol 10 mg oral tablet: 1 tab(s) orally once a day  senna oral tablet: 2 tab(s) orally once a day (at bedtime)

## 2021-10-01 NOTE — CONSULT NOTE ADULT - ATTENDING COMMENTS
I have seen this patient with the fellow and agree with their assessment and plan. I was physically present for significant portions of the evaluation and management (E/M) service provided.  I agree with the above history, physical, and plan which I have reviewed and edited where appropriate.    Needs HD today and then with PRBCS  Dc planning after that  AVF jackie Aranda MD  Cell   Pager   Office  I have seen this patient with the fellow and agree with their assessment and plan. I was physically present for significant portions of the evaluation and management (E/M) service provided.  I agree with the above history, physical, and plan which I have reviewed and edited where appropriate.    Needs HD today and then with PRBCS  Dc planning after that  AVF jackie Aranda MD  Cell   Pager   Office     For weekend please contact Dr Bryanna Jackson fellow) or Dr Katiuska Taylor( attending)

## 2021-10-01 NOTE — H&P ADULT - NSICDXPASTMEDICALHX_GEN_ALL_CORE_FT
PAST MEDICAL HISTORY:  Cirrhosis of liver without ascites, unspecified hepatic cirrhosis type     History of myelofibrosis     Hypertension     Pancreatic cyst     Peptic ulcer disease     Polycythemia vera and secondary myelofibrosis    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

## 2021-10-01 NOTE — DISCHARGE NOTE NURSING/CASE MANAGEMENT/SOCIAL WORK - NSDCVIVACCINE_GEN_ALL_CORE_FT
influenza, injectable, quadrivalent, preservative free; 20-Nov-2014 12:44; Aston Ulloa (RN); [employer]; [id number]; IntraMuscular; Deltoid Left.; 0.5 milliLiter(s);   influenza, injectable, quadrivalent, preservative free; 05-Oct-2016 18:15; Rosario James (RN); [employer]; [id number]; IntraMuscular; Deltoid Left.; 0.5 milliLiter(s); VIS (VIS Published: 07-Aug-2015, VIS Presented: 05-Oct-2016);

## 2021-10-01 NOTE — H&P ADULT - ASSESSMENT
NIGHT HOSPITALIST:   NIGHT HOSPITALIST:  Referral for anaemia in this patient with no clear clinical evidence of GI losses with a complex medical history of non cirrhotic portal HTN with gastric varices, past COVID-19 infection in April 2021, ESRD on HD, JAK2+ PV with conversion to myelofibrosis maintained on Jakafi with massive splenomegaly and past history of splenic vein thrombosis, with multiple antibodies from prior transfusions.  For anticipated blood transfusion with HD but will defer to evaluation by renal and haematology later this AM.    ESRD on HD per renal.    Will check Hgb A1C with elevated random glucose.    RAMONE for now with bleeding risk

## 2021-10-01 NOTE — DISCHARGE NOTE NURSING/CASE MANAGEMENT/SOCIAL WORK - PATIENT PORTAL LINK FT
You can access the FollowMyHealth Patient Portal offered by [facility] by registering at the following website: [email or web address]. By joining [employer]’s FollowMyHealth portal, you will also be able to view your health information using other applications (apps) compatible with our system.

## 2021-10-02 LAB
A1C WITH ESTIMATED AVERAGE GLUCOSE RESULT: 5.3 % — SIGNIFICANT CHANGE UP (ref 4–5.6)
ESTIMATED AVERAGE GLUCOSE: 105 MG/DL — SIGNIFICANT CHANGE UP (ref 68–114)

## 2021-10-05 ENCOUNTER — APPOINTMENT (OUTPATIENT)
Dept: VASCULAR SURGERY | Facility: CLINIC | Age: 61
End: 2021-10-05
Payer: MEDICAID

## 2021-10-05 VITALS — DIASTOLIC BLOOD PRESSURE: 73 MMHG | SYSTOLIC BLOOD PRESSURE: 112 MMHG | HEART RATE: 78 BPM

## 2021-10-05 DIAGNOSIS — T82.858A STENOSIS OF OTHER VASCULAR PROSTHETIC, INITIAL ENCOUNTER: ICD-10-CM

## 2021-10-05 PROCEDURE — 93990 DOPPLER FLOW TESTING: CPT

## 2021-10-05 PROCEDURE — 99213 OFFICE O/P EST LOW 20 MIN: CPT

## 2021-10-06 PROBLEM — Z87.39 PERSONAL HISTORY OF OTHER DISEASES OF THE MUSCULOSKELETAL SYSTEM AND CONNECTIVE TISSUE: Chronic | Status: ACTIVE | Noted: 2021-10-01

## 2021-10-14 ENCOUNTER — LABORATORY RESULT (OUTPATIENT)
Age: 61
End: 2021-10-14

## 2021-10-14 ENCOUNTER — APPOINTMENT (OUTPATIENT)
Dept: DISASTER EMERGENCY | Facility: CLINIC | Age: 61
End: 2021-10-14

## 2021-10-18 NOTE — REASON FOR VISIT
[Prolonged Bleeding] : prolonged bleeding [Other ___] : a [unfilled] visit for [Spouse] : spouse [Family Member] : family member [FreeTextEntry2] : stenosis seen on duplex

## 2021-10-18 NOTE — PROCEDURE
[Resume diet] : resume diet [FreeTextEntry1] :  left arm fistula/fistulogram/angioplasty [Site check for bleeding/hematoma/thrill/bruit] : Site check for bleeding/hematoma/thrill/bruit [Vital signs on admission the q 15 mins x2] : Vital signs on admission the q 15 mins x2

## 2021-10-18 NOTE — PAST MEDICAL HISTORY
[Increasing age ( >40 years old)] : Increasing age ( >40 years old) [Other VTE Risk Factors: _______] : [unfilled] [No therapy indicated for cases scheduled for less than one hour] : No therapy indicated for cases scheduled for less than one hour. [FreeTextEntry1] : Malignant Hyperthermia Screening Tool and Risk of Bleeding Assessment \par Ms. MIHYEON HU denies family of unexpected death following Anesthesia or Exercise.\par Denies Family history of Malignant Hyperthermia, Muscle or Neuromuscular disorder and High Temperature  following exercise.\par \par Ms. MIHYEON HU denies history of Muscle Spasm, Dark or Chocolate- Colored and Unanticipated fever immediately following anaesthesia or serious exercise.\par Ms. MIHYEON HU also denies bleeding tendencies/Risks of Bleeding.\par

## 2021-10-18 NOTE — PHYSICAL EXAM
[Thrill] : thrill [Pulsatile Thrill] : pulsatile thrill [Aneurysm] : aneurysm [Hand well perfused] : hand well perfused [2+] : left 2+ [Normal] : no respiratory distress, clear to auscultation, no accessory muscle use [Bleeding] : no bleeding [Ulcer] : no ulcer [de-identified] : 2 small aneurysmal dilation of the left upper extremity avf, skin is intact without ulcers but was noted to be shinny and thinned

## 2021-10-18 NOTE — HISTORY OF PRESENT ILLNESS
[] : left basilic fistula [FreeTextEntry1] : Dr Dempsey  9/18/2017 [FreeTextEntry4] : yesterday  [FreeTextEntry5] : yesterday

## 2021-10-19 ENCOUNTER — RESULT REVIEW (OUTPATIENT)
Age: 61
End: 2021-10-19

## 2021-10-19 ENCOUNTER — APPOINTMENT (OUTPATIENT)
Dept: ENDOVASCULAR SURGERY | Facility: CLINIC | Age: 61
End: 2021-10-19
Payer: MEDICAID

## 2021-10-19 VITALS
WEIGHT: 114.64 LBS | DIASTOLIC BLOOD PRESSURE: 69 MMHG | BODY MASS INDEX: 21.1 KG/M2 | RESPIRATION RATE: 18 BRPM | TEMPERATURE: 98 F | HEART RATE: 81 BPM | SYSTOLIC BLOOD PRESSURE: 107 MMHG | HEIGHT: 62 IN | OXYGEN SATURATION: 100 %

## 2021-10-19 PROCEDURE — 36907Z: CUSTOM | Mod: 59

## 2021-10-19 PROCEDURE — 36902Z: CUSTOM

## 2021-10-19 RX ORDER — PANTOPRAZOLE 40 MG/1
40 TABLET, DELAYED RELEASE ORAL DAILY
Qty: 30 | Refills: 0 | Status: DISCONTINUED | COMMUNITY
Start: 2020-12-18 | End: 2021-10-19

## 2021-10-20 ENCOUNTER — INPATIENT (INPATIENT)
Facility: HOSPITAL | Age: 61
LOS: 1 days | Discharge: HOME | DRG: 811 | End: 2021-10-22
Attending: INTERNAL MEDICINE | Admitting: INTERNAL MEDICINE
Payer: MEDICAID

## 2021-10-20 ENCOUNTER — NON-APPOINTMENT (OUTPATIENT)
Age: 61
End: 2021-10-20

## 2021-10-20 VITALS
OXYGEN SATURATION: 98 % | DIASTOLIC BLOOD PRESSURE: 70 MMHG | WEIGHT: 113.98 LBS | SYSTOLIC BLOOD PRESSURE: 140 MMHG | TEMPERATURE: 98 F | HEART RATE: 81 BPM | HEIGHT: 62 IN | RESPIRATION RATE: 18 BRPM

## 2021-10-20 DIAGNOSIS — T85.898A OTHER SPECIFIED COMPLICATION OF OTHER INTERNAL PROSTHETIC DEVICES, IMPLANTS AND GRAFTS, INITIAL ENCOUNTER: Chronic | ICD-10-CM

## 2021-10-20 DIAGNOSIS — Z99.2 DEPENDENCE ON RENAL DIALYSIS: Chronic | ICD-10-CM

## 2021-10-20 DIAGNOSIS — D64.9 ANEMIA, UNSPECIFIED: ICD-10-CM

## 2021-10-20 DIAGNOSIS — I10 ESSENTIAL (PRIMARY) HYPERTENSION: ICD-10-CM

## 2021-10-20 DIAGNOSIS — Z29.9 ENCOUNTER FOR PROPHYLACTIC MEASURES, UNSPECIFIED: ICD-10-CM

## 2021-10-20 DIAGNOSIS — N18.6 END STAGE RENAL DISEASE: ICD-10-CM

## 2021-10-20 DIAGNOSIS — D45 POLYCYTHEMIA VERA: ICD-10-CM

## 2021-10-20 DIAGNOSIS — I77.0 ARTERIOVENOUS FISTULA, ACQUIRED: Chronic | ICD-10-CM

## 2021-10-20 LAB
ALBUMIN SERPL ELPH-MCNC: 4.7 G/DL — SIGNIFICANT CHANGE UP (ref 3.3–5)
ALP SERPL-CCNC: 160 U/L — HIGH (ref 40–120)
ALT FLD-CCNC: 10 U/L — SIGNIFICANT CHANGE UP (ref 10–45)
ANION GAP SERPL CALC-SCNC: 18 MMOL/L — HIGH (ref 5–17)
APTT BLD: 26.8 SEC — LOW (ref 27.5–35.5)
AST SERPL-CCNC: 24 U/L — SIGNIFICANT CHANGE UP (ref 10–40)
BASOPHILS # BLD AUTO: 0.07 K/UL — SIGNIFICANT CHANGE UP (ref 0–0.2)
BASOPHILS NFR BLD AUTO: 0.9 % — SIGNIFICANT CHANGE UP (ref 0–2)
BILIRUB SERPL-MCNC: 2.1 MG/DL — HIGH (ref 0.2–1.2)
BLD GP AB SCN SERPL QL: POSITIVE — SIGNIFICANT CHANGE UP
BLD GP AB SCN SERPL QL: POSITIVE — SIGNIFICANT CHANGE UP
BUN SERPL-MCNC: 33 MG/DL — HIGH (ref 7–23)
CALCIUM SERPL-MCNC: 9.2 MG/DL — SIGNIFICANT CHANGE UP (ref 8.4–10.5)
CHLORIDE SERPL-SCNC: 97 MMOL/L — SIGNIFICANT CHANGE UP (ref 96–108)
CO2 SERPL-SCNC: 20 MMOL/L — LOW (ref 22–31)
CREAT SERPL-MCNC: 5.51 MG/DL — HIGH (ref 0.5–1.3)
EOSINOPHIL # BLD AUTO: 0.07 K/UL — SIGNIFICANT CHANGE UP (ref 0–0.5)
EOSINOPHIL NFR BLD AUTO: 0.9 % — SIGNIFICANT CHANGE UP (ref 0–6)
GLUCOSE SERPL-MCNC: 90 MG/DL — SIGNIFICANT CHANGE UP (ref 70–99)
HCT VFR BLD CALC: 25.1 % — LOW (ref 34.5–45)
HGB BLD-MCNC: 7.5 G/DL — LOW (ref 11.5–15.5)
INR BLD: 1.08 RATIO — SIGNIFICANT CHANGE UP (ref 0.88–1.16)
LYMPHOCYTES # BLD AUTO: 1.14 K/UL — SIGNIFICANT CHANGE UP (ref 1–3.3)
LYMPHOCYTES # BLD AUTO: 15.5 % — SIGNIFICANT CHANGE UP (ref 13–44)
MCHC RBC-ENTMCNC: 29.9 GM/DL — LOW (ref 32–36)
MCHC RBC-ENTMCNC: 30 PG — SIGNIFICANT CHANGE UP (ref 27–34)
MCV RBC AUTO: 100.4 FL — HIGH (ref 80–100)
MONOCYTES # BLD AUTO: 0.47 K/UL — SIGNIFICANT CHANGE UP (ref 0–0.9)
MONOCYTES NFR BLD AUTO: 6.4 % — SIGNIFICANT CHANGE UP (ref 2–14)
NEUTROPHILS # BLD AUTO: 5.03 K/UL — SIGNIFICANT CHANGE UP (ref 1.8–7.4)
NEUTROPHILS NFR BLD AUTO: 63.6 % — SIGNIFICANT CHANGE UP (ref 43–77)
PLATELET # BLD AUTO: 123 K/UL — LOW (ref 150–400)
POTASSIUM SERPL-MCNC: 5.3 MMOL/L — SIGNIFICANT CHANGE UP (ref 3.5–5.3)
POTASSIUM SERPL-SCNC: 5.3 MMOL/L — SIGNIFICANT CHANGE UP (ref 3.5–5.3)
PROT SERPL-MCNC: 8.2 G/DL — SIGNIFICANT CHANGE UP (ref 6–8.3)
PROTHROM AB SERPL-ACNC: 12.9 SEC — SIGNIFICANT CHANGE UP (ref 10.6–13.6)
RBC # BLD: 2.5 M/UL — LOW (ref 3.8–5.2)
RBC # FLD: 20.9 % — HIGH (ref 10.3–14.5)
RH IG SCN BLD-IMP: POSITIVE — SIGNIFICANT CHANGE UP
RH IG SCN BLD-IMP: POSITIVE — SIGNIFICANT CHANGE UP
SARS-COV-2 RNA SPEC QL NAA+PROBE: SIGNIFICANT CHANGE UP
SODIUM SERPL-SCNC: 135 MMOL/L — SIGNIFICANT CHANGE UP (ref 135–145)
WBC # BLD: 7.38 K/UL — SIGNIFICANT CHANGE UP (ref 3.8–10.5)
WBC # FLD AUTO: 7.38 K/UL — SIGNIFICANT CHANGE UP (ref 3.8–10.5)

## 2021-10-20 PROCEDURE — 93010 ELECTROCARDIOGRAM REPORT: CPT

## 2021-10-20 PROCEDURE — 99222 1ST HOSP IP/OBS MODERATE 55: CPT

## 2021-10-20 PROCEDURE — 99253 IP/OBS CNSLTJ NEW/EST LOW 45: CPT | Mod: GC

## 2021-10-20 PROCEDURE — 99223 1ST HOSP IP/OBS HIGH 75: CPT

## 2021-10-20 PROCEDURE — 99285 EMERGENCY DEPT VISIT HI MDM: CPT

## 2021-10-20 RX ORDER — ACETAMINOPHEN 500 MG
650 TABLET ORAL EVERY 6 HOURS
Refills: 0 | Status: DISCONTINUED | OUTPATIENT
Start: 2021-10-20 | End: 2021-10-22

## 2021-10-20 RX ORDER — RUXOLITINIB 25 MG/1
10 TABLET ORAL
Refills: 0 | Status: DISCONTINUED | OUTPATIENT
Start: 2021-10-20 | End: 2021-10-22

## 2021-10-20 RX ORDER — SENNA PLUS 8.6 MG/1
2 TABLET ORAL AT BEDTIME
Refills: 0 | Status: DISCONTINUED | OUTPATIENT
Start: 2021-10-20 | End: 2021-10-22

## 2021-10-20 RX ORDER — INFLUENZA VIRUS VACCINE 15; 15; 15; 15 UG/.5ML; UG/.5ML; UG/.5ML; UG/.5ML
0.5 SUSPENSION INTRAMUSCULAR ONCE
Refills: 0 | Status: DISCONTINUED | OUTPATIENT
Start: 2021-10-20 | End: 2021-10-22

## 2021-10-20 RX ORDER — CALCIUM ACETATE 667 MG
1334 TABLET ORAL
Refills: 0 | Status: DISCONTINUED | OUTPATIENT
Start: 2021-10-20 | End: 2021-10-22

## 2021-10-20 RX ORDER — ONDANSETRON 8 MG/1
4 TABLET, FILM COATED ORAL EVERY 8 HOURS
Refills: 0 | Status: DISCONTINUED | OUTPATIENT
Start: 2021-10-20 | End: 2021-10-22

## 2021-10-20 RX ORDER — LANOLIN ALCOHOL/MO/W.PET/CERES
3 CREAM (GRAM) TOPICAL AT BEDTIME
Refills: 0 | Status: DISCONTINUED | OUTPATIENT
Start: 2021-10-20 | End: 2021-10-22

## 2021-10-20 RX ORDER — CALCIUM ACETATE 667 MG
667 TABLET ORAL
Refills: 0 | Status: DISCONTINUED | OUTPATIENT
Start: 2021-10-20 | End: 2021-10-20

## 2021-10-20 RX ADMIN — Medication 3 MILLIGRAM(S): at 23:44

## 2021-10-20 RX ADMIN — Medication 1334 MILLIGRAM(S): at 18:54

## 2021-10-20 RX ADMIN — SENNA PLUS 2 TABLET(S): 8.6 TABLET ORAL at 22:04

## 2021-10-20 RX ADMIN — Medication 1 TABLET(S): at 18:54

## 2021-10-20 NOTE — ED ADULT NURSE NOTE - CHIEF COMPLAINT QUOTE
low H&H - hemoglobin 7.2  hx dialysis MWF- due today for dialysis, states she was sent to have transfusion during dialysis

## 2021-10-20 NOTE — H&P ADULT - NSHPADDITIONALINFOADULT_GEN_ALL_CORE
.  Krystina Aguilar MD  Division of Hospital Medicine  [facility]   Pager: [phone]    Awaiting HD with 1u PRBC transfusion during HD.  Can likely be discharged home after transfusion if no contraindication from hematology and nephrology standpoint.    Plan discussed with patient in Irish and medicine JONEL Silva.

## 2021-10-20 NOTE — ED PROVIDER NOTE - PROGRESS NOTE DETAILS
Attending MD Hanson: Call back from Dr. Barreto, reports discussed with Dr. Guo, patient was sent in to be transfused to goal of Hb 8 and can then be discharged for outpatient follow up.  Reports patient has difficult IV access which is why she was sent to the Emergency Department. Attending MD Hanson: Spoke with Fellow Dr. Barreto, will reach out to Dr. RAN Guo Attending MD Hanson: Spoke with Dr. De León.  Reports that patient with difficulty access so needs to be admitted for dialysis and transfusion at the same time.  Explained patient has peripheral IV access.  Reports that this has been an ongoing issue and that the plan is to place improved access (?port) but until then the patient will need to be admitted to be dialyzed and transfused.  Reports that she will notify her team to come see patient in the Emergency Department.  Dr. Casas contacted, patient to be admitted to his service.  Spoke with Dr. Barreto of [facility] to update plan.

## 2021-10-20 NOTE — ED ADULT TRIAGE NOTE - CHIEF COMPLAINT QUOTE
low H&H - hemoglobin 7.2  hx dialysis MWF- due today for dialysis, states she was sent to have transfusion during dialysis
No

## 2021-10-20 NOTE — ED ADULT NURSE REASSESSMENT NOTE - NS ED NURSE REASSESS COMMENT FT1
Report received from DARREN Gutierres. Pt AAOx4, NAD, resp nonlabored, skin warm/dry, resting comfortably in bed. Pt presented to ED c/o abnormal lab work. Pt denies headache, dizziness, chest pain, palpitations, SOB, abd pain, n/v/d, urinary symptoms, fevers, chills, weakness at this time. VSS. Pt awaiting bed assignment. Pending blood transfusion during dialysis treatment. Safety maintained with call bell within reach.

## 2021-10-20 NOTE — ED PROVIDER NOTE - ATTENDING CONTRIBUTION TO CARE
Attending MD Hanson:   I personally have seen and examined this patient.  Physician assistant note reviewed and agree on plan of care and except where noted.  See below for details.     Seen in [place] 60  Heme/Onc Dr. Benjy Guo [phone]    61F with PMH/PSH including polycythemia vera, HTN, ESRD on MWF HD (last HD yesterday - full, [address], via LUE AVF), ?cardiac stents (points to L chest says has had stent but does not know where) presents to the ED sent in by Dr. Guo.  Reports last Wednesday had blood work, has blood work every Wednesday, and Hb 7.2.  Reports was told to come to the hospital for a transfusion.  Reports has had mild dizziness for last week.   Denies shortness of breath, chest pain.  Denies abdominal pain, nausea, vomiting, diarrhea.  Reports does make urine, denies dysuria, hematuria, frequency, urgency.  Denies fevers, chills, recent illness.  Reports COVID vaccinated.  Reports had transfusion with her HD on 8/1/21.  Reports is due to get AVF ballooned and recently had presurgical testing.  A ten (10) point review of systems was negative other than as stated in the HPI or elsewhere in the chart.     Exam:   General: NAD  HENT: head NCAT, airway patent with moist mucous membranes  Eyes: PERRL  Lungs: lungs CTAB with good inspiratory effort, no wheezing, no rhonchi, no rales  Cardiac: +S1S2, +murmur, no r/g, LUE AVF with bruit and thrill  GI: abdomen soft with +BS, NT, ND  : no CVAT  MSK: FROM at neck, no tenderness to midline palpation, no stepoffs along length of spine, no calf tenderness, swelling, erythema or warmth  Neuro: moving all extremities spontaneously, sensory grossly intact, no gross neuro deficits  Psych: normal mood and affect     A/P: 61F with     TO BE COMPLETED Attending MD Hanson:   I personally have seen and examined this patient.  Physician assistant note reviewed and agree on plan of care and except where noted.  See below for details.     Seen in [place] 60  Heme/Onc Dr. Jacky Guo [phone]    61F with PMH/PSH including polycythemia vera, HTN, ESRD on MWF HD (last HD yesterday - full, [address], via LUE AVF), ?cardiac stents (points to L chest says has had stent but does not know where) presents to the ED sent in by Dr. Guo.  Reports last Wednesday 10/13/21 had blood work, has blood work every Wednesday, and Hb 7.2.  Reports was told to come to the hospital for a transfusion.  Reports has had mild dizziness for last week.   Denies shortness of breath, chest pain.  Denies abdominal pain, nausea, vomiting, diarrhea.  Reports does make urine, denies dysuria, hematuria, frequency, urgency.  Denies fevers, chills, recent illness.  Reports COVID vaccinated.  Reports had transfusion with her HD on 8/1/21.  Reports is due to get AVF ballooned and recently had presurgical testing.  A ten (10) point review of systems was negative other than as stated in the HPI or elsewhere in the chart.     Exam:   General: NAD  HENT: head NCAT, airway patent with moist mucous membranes  Eyes: PERRL  Lungs: lungs CTAB with good inspiratory effort, no wheezing, no rhonchi, no rales  Cardiac: +S1S2, +murmur, no r/g, LUE AVF with bruit and thrill  GI: abdomen soft with +BS, NT, ND  : no CVAT  MSK: FROM at neck, no tenderness to midline palpation, no stepoffs along length of spine, no calf tenderness, swelling, erythema or warmth  Neuro: moving all extremities spontaneously, sensory grossly intact, no gross neuro deficits  Psych: normal mood and affect     A/P: 61F with ESRD, polycythemia vera reported sent in for transfusion concurrently with dialysis, patient reports full dialysis yesterday, patient reports mild intermittent dizziness, possibly could be related to symptomatic anemia, call to Dr. Guo - unable to get through, message left with service requesting call back, will await.  Reports that she was sent in to the Emergency Department because she has difficult IV access. Patient initially refusing IV, reporting very hard stick and reporting she was told she was going to be getting the transfusion with her dialysis and would not need IV because would be done via dialysis access.  Explained would need IV access here in event emergent decompensaiton/transfusion, agreed, will await to hear from Heme Attending MD Hanson:   I personally have seen and examined this patient.  Physician assistant note reviewed and agree on plan of care and except where noted.  See below for details.     Seen in [place] 60  Heme/Onc Dr. Jacky Guo [phone]    61F with PMH/PSH including polycythemia vera, HTN, ESRD on MWF HD (last HD yesterday - full, [address], via LUE AVF), ?cardiac stents (points to L chest says has had stent but does not know where) presents to the ED sent in by Dr. Guo.  Reports last Wednesday 10/13/21 had blood work, has blood work every Wednesday, and Hb 7.2.  Reports was told to come to the hospital for a transfusion.  Reports has had mild dizziness for last week.   Denies shortness of breath, chest pain.  Denies abdominal pain, nausea, vomiting, diarrhea.  Reports does make urine, denies dysuria, hematuria, frequency, urgency.  Denies fevers, chills, recent illness.  Reports COVID vaccinated.  Reports had transfusion with her HD on 8/1/21.  Reports is due to get AVF ballooned and recently had presurgical testing.  A ten (10) point review of systems was negative other than as stated in the HPI or elsewhere in the chart. LMP 13-14 yrs ago.    Exam:   General: NAD  HENT: head NCAT, airway patent with moist mucous membranes  Eyes: PERRL  Lungs: lungs CTAB with good inspiratory effort, no wheezing, no rhonchi, no rales  Cardiac: +S1S2, +murmur, no r/g, LUE AVF with bruit and thrill  GI: abdomen soft with +BS, NT, ND  : no CVAT  MSK: FROM at neck, no tenderness to midline palpation, no stepoffs along length of spine, no calf tenderness, swelling, erythema or warmth  Neuro: moving all extremities spontaneously, sensory grossly intact, no gross neuro deficits  Psych: normal mood and affect     A/P: 61F with ESRD, polycythemia vera reported sent in for transfusion concurrently with dialysis, patient reports full dialysis yesterday, patient reports mild intermittent dizziness, possibly could be related to symptomatic anemia, call to Dr. Guo - unable to get through, message left with service requesting call back, will await.  Reports that she was sent in to the Emergency Department because she has difficult IV access. Patient initially refusing IV, reporting very hard stick and reporting she was told she was going to be getting the transfusion with her dialysis and would not need IV because would be done via dialysis access.  Explained would need IV access here in event emergent decompensaiton/transfusion, agreed, will await to hear from Heme Attending MD Hanson:   I personally have seen and examined this patient.  Physician assistant note reviewed and agree on plan of care and except where noted.  See below for details.     Seen in [place] 60  Heme/Onc Dr. Jacky Guo [phone]  Nephrology Dr. 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 Guo.  Reports last Wednesday 10/13/21 had blood work, has blood work every Wednesday, and Hb 7.2.  Reports was told to come to the hospital for a transfusion.  Reports has had mild dizziness for last week.   Denies shortness of breath, chest pain.  Denies abdominal pain, nausea, vomiting, diarrhea.  Reports does make urine, denies dysuria, hematuria, frequency, urgency.  Denies fevers, chills, recent illness.  Reports COVID vaccinated.  Reports had transfusion with her HD on 8/1/21.  Reports is due to get AVF ballooned and recently had presurgical testing.  A ten (10) point review of systems was negative other than as stated in the HPI or elsewhere in the chart. LMP 13-14 yrs ago.    Exam:   General: NAD  HENT: head NCAT, airway patent with moist mucous membranes  Eyes: PERRL  Lungs: lungs CTAB with good inspiratory effort, no wheezing, no rhonchi, no rales  Cardiac: +S1S2, +murmur, no r/g, LUE AVF with bruit and thrill  GI: abdomen soft with +BS, NT, ND  : no CVAT  MSK: FROM at neck, no tenderness to midline palpation, no stepoffs along length of spine, no calf tenderness, swelling, erythema or warmth  Neuro: moving all extremities spontaneously, sensory grossly intact, no gross neuro deficits  Psych: normal mood and affect     A/P: 61F with ESRD, polycythemia vera reported sent in for transfusion concurrently with dialysis, patient reports full dialysis yesterday, patient reports mild intermittent dizziness, possibly could be related to symptomatic anemia, call to Dr. Guo - unable to get through, message left with service requesting call back, will await.  Reports that she was sent in to the Emergency Department because she has difficult IV access. Patient initially refusing IV, reporting very hard stick and reporting she was told she was going to be getting the transfusion with her dialysis and would not need IV because would be done via dialysis access.  Explained would need IV access here in event emergent decompensaiton/transfusion, agreed, will await to hear from Heme

## 2021-10-20 NOTE — CONSULT NOTE ADULT - ATTENDING COMMENTS
61 year old female referred from [facility] for blood transfusion; difficult with IV access led to transfer . Patients is treated for myelofibrosis and has received Jakafi and danazol by Dr Guo . Physical examination as noted above is stable.

## 2021-10-20 NOTE — ED ADULT NURSE NOTE - NSICDXPASTMEDICALHX_GEN_ALL_CORE_FT
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

## 2021-10-20 NOTE — H&P ADULT - NSHPREVIEWOFSYSTEMS_GEN_ALL_CORE
CONSTITUTIONAL: No fever, weight loss, or fatigue  EYES: No eye pain, visual disturbances, or discharge  ENMT:  No difficulty hearing, tinnitus, vertigo; No sinus or throat pain  NECK: No pain or stiffness  RESPIRATORY: No cough, wheezing, chills or hemoptysis; No shortness of breath  CARDIOVASCULAR: No chest pain, palpitations, or leg swelling +transient mild dizziness last week now resolved  GASTROINTESTINAL: No abdominal or epigastric pain. No nausea, vomiting, or hematemesis; No diarrhea or constipation. No melena or hematochezia.  GENITOURINARY: No dysuria, frequency, hematuria, or incontinence  NEUROLOGICAL: No headaches, memory loss, loss of strength, numbness, or tremors  SKIN: No itching, burning, rashes, or lesions   LYMPH NODES: No enlarged glands  ENDOCRINE: No heat or cold intolerance; No hair loss  MUSCULOSKELETAL: No joint pain or swelling; No muscle, back, or extremity pain  PSYCHIATRIC: No depression, anxiety, mood swings, or difficulty sleeping  HEME/LYMPH: No easy bruising, or bleeding gums

## 2021-10-20 NOTE — H&P ADULT - NSHPPHYSICALEXAM_GEN_ALL_CORE
CONSTITUTIONAL: NAD, well-developed, well-groomed  EYES: PERRLA; conjunctiva and sclera clear  ENMT: Moist oral mucosa, no pharyngeal injection or exudates; normal dentition  NECK: Supple, no palpable masses; no thyromegaly  RESPIRATORY: Normal respiratory effort; lungs are clear to auscultation bilaterally  CARDIOVASCULAR: Regular rate and rhythm, normal S1 and S2, no murmur/rub/gallop; No lower extremity edema; Peripheral pulses are 2+ bilaterally, +LUE VF with palpable thrill  ABDOMEN: Soft, Non-distended,  Nontender to palpation, normoactive bowel sounds  MUSCULOSKELETAL:  No clubbing or cyanosis of digits; no joint swelling or tenderness to palpation  PSYCH: A+O to person, place, and time; affect appropriate  NEUROLOGY: CN 2-12 are intact and symmetric; no gross sensory deficits   SKIN: No rashes; no palpable lesions, +generalized bronzing of skin - chronic

## 2021-10-20 NOTE — ED PROVIDER NOTE - OBJECTIVE STATEMENT
61F with PMH/PSH including polycythemia vera, HTN, ESRD on MWF HD (last HD yesterday - full, [address], via [place] [facility]), ?cardiac stents (points to L chest says has had stent but does not know where) presents to the ED sent in by Dr. Guo.  Reports last Wednesday 10/13/21 had blood work, has blood work every Wednesday, and Hb 7.2.  Reports was told to come to the hospital for a transfusion.  Reports has had mild dizziness for last week.   Denies shortness of breath, chest pain.  Denies abdominal pain, nausea, vomiting, diarrhea.  Reports does make urine, denies dysuria, hematuria, frequency, urgency.  Denies fevers, chills, recent illness.  Reports COVID vaccinated.  R

## 2021-10-20 NOTE — CONSULT NOTE ADULT - SUBJECTIVE AND OBJECTIVE BOX
Hematology Consult Note    HPI: 61F with PMH/PSH including polycythemia vera, HTN, ESRD on MWF HD (last HD yesterday - full, [address], via LUE AVF), ?cardiac stents (points to L chest says has had stent but does not know where) presents to the ED sent in by Dr. Guo. Reports last Wednesday 10/13/21 had blood work, has blood work every Wednesday, and Hb 7.2. Reports was told to come to the hospital for a transfusion.  Reports has had mild dizziness for last week.   Denies shortness of breath, chest pain.  Denies abdominal pain, nausea, vomiting, diarrhea.  Reports does make urine, denies dysuria, hematuria, frequency, urgency.  Denies fevers, chills, recent illness.  Reports COVID vaccinated.      Hematology history  History of MAK 2 + polycythemia vera, initially diagnosed in 2012. 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 Hollie Guzmán in [place] (# [phone]). She has had a bone marrow biopsy done at the time of diagnosis. Was previously non complaint with therapy there, however has been quite complaint with the hydroxyurea since coming to [facility] fellows clinic here in 2017. Patient now follows with Dr. Jacky Guo after closure of the fellows clinic.   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  She was admitted to [facility] from July 3-5, 2018 due to hyperkalemia and thrombosis of her AVF. Hematology was consulted and recommended phlebotomy, but patient refused. She was discharged on Hydrea 500mg on HD days, which she is still on. As of 9/27/19 she has transferred from the hematology fellows clinic to Dr. Guo's service at [facility]. She was previously treated with HU in the setting of abdominal pain 2/2 splenomegaly w/ splenic infarcts; Hgb came up with treatment and pain improved. After a month of Jakafi 15 mg following HD three times a week, patient abdominal pain and LUQ pain resolved, but she became weak and anemic, requiring transfusion at the hospital. She is currently on Jakafi 10 mg on days she gets HD three times a week, recently started on Danazol.      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    Cirrhosis of liver without ascites, unspecified hepatic cirrhosis type    Pancreatic cyst    History of myelofibrosis    Peritoneal dialysis catheter in place  Placed 8/9/2017    Hemoperitoneum as complication of peritoneal dialysis  s/p removal 9/18    AV fistula  Placed 9/18        FAMILY HISTORY:  Family history of hypertension in mother    Family history of malignant neoplasm (Grandparent)  head and neck in father    FH: cirrhosis (Sibling)        SOCIAL HISTORY: No EtOH, no tobacco    REVIEW OF SYSTEMS:    CONSTITUTIONAL: No weakness, fevers or chills  EYES/ENT: No visual changes;  No vertigo or throat pain   NECK: No pain or stiffness  RESPIRATORY: No cough, wheezing, hemoptysis; No shortness of breath  CARDIOVASCULAR: No chest pain or palpitations  GASTROINTESTINAL: No abdominal or epigastric pain. No nausea, vomiting, or hematemesis; No diarrhea or constipation. No melena or hematochezia.  GENITOURINARY: No dysuria, frequency or hematuria  NEUROLOGICAL: No numbness or weakness  SKIN: No itching, burning, rashes, or lesions   All other review of systems is negative unless indicated above.    Height (cm): 157.5 (10-20 @ 07:07)  Weight (kg): 51.7 (10-20 @ 07:07)  BMI (kg/m2): 20.8 (10-20 @ 07:07)  BSA (m2): 1.51 (10-20 @ 07:07)    T(F): 97.9 (10-20-21 @ 10:50), Max: 98.1 (10-20-21 @ 07:07)  HR: 88 (10-20-21 @ 10:50)  BP: 113/69 (10-20-21 @ 10:50)  RR: 17 (10-20-21 @ 10:50)  SpO2: 100% (10-20-21 @ 10:50)  Wt(kg): --    Physical Exam  GENERAL: NAD, well-developed  HEAD:  Atraumatic, Normocephalic  EYES: EOMI, conjunctiva and sclera clear  NECK: Supple, No JVD  CHEST/LUNG: Clear to auscultation bilaterally; No wheeze  HEART: Regular rate and rhythm; No murmurs, rubs, or gallops  ABDOMEN: Soft, Nontender, Nondistended; Bowel sounds present. + Splenomegaly  EXTREMITIES:  2+ Peripheral Pulses, No clubbing, cyanosis, or edema  NEUROLOGY: non-focal  SKIN: No rashes or lesions                          7.5    7.38  )-----------( 123      ( 20 Oct 2021 08:42 )             25.1       10-20    135  |  97  |  33<H>  ----------------------------<  90  5.3   |  20<L>  |  5.51<H>    Ca    9.2      20 Oct 2021 08:42    TPro  8.2  /  Alb  4.7  /  TBili  2.1<H>  /  DBili  x   /  AST  24  /  ALT  10  /  AlkPhos  160<H>  10-20            
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    Cirrhosis of liver without ascites, unspecified hepatic cirrhosis type    Pancreatic cyst    History of myelofibrosis    Peritoneal dialysis catheter in place  Placed 8/9/2017    Hemoperitoneum as complication of peritoneal dialysis  s/p removal 9/18    AV fistula  Placed 9/18      FAMILY HISTORY:  Family history of hypertension in mother    Family history of malignant neoplasm (Grandparent)  head and neck in father    FH: cirrhosis (Sibling)      PAST SOCIAL HISTORY:    ALLERGIES & MEDICATIONS  --------------------------------------------------------------------------------  Allergies    No Known Allergies    Intolerances      Standing Inpatient Medications  calcium acetate 667 milliGRAM(s) Oral three times a day with meals  Nephro-deonna 1 Tablet(s) Oral daily  propranolol 10 milliGRAM(s) Oral daily  senna 2 Tablet(s) Oral at bedtime    PRN Inpatient Medications  acetaminophen   Tablet .. 650 milliGRAM(s) Oral every 6 hours PRN  aluminum hydroxide/magnesium hydroxide/simethicone Suspension 30 milliLiter(s) Oral every 4 hours PRN  melatonin 3 milliGRAM(s) Oral at bedtime PRN  ondansetron Injectable 4 milliGRAM(s) IV Push every 8 hours PRN      REVIEW OF SYSTEMS: exertional SOB  --------------------------------------------------------------------------------  Constitutional: [ ] Fever [ ] Chills [ ] Fatigue [ ] Weight change   HEENT: [ ] Blurred vision [ ] Eye Pain [ ] Headache [ ] Runny nose [ ] Sore Throat   Respiratory: [ ] Cough [ ] Wheezing [ ] Shortness of breath  Cardiovascular: [ ] Chest Pain [ ] Palpitations [ ] SANTAMARIA [ ] PND [ ] Orthopnea  Gastrointestinal: [ ] Abdominal Pain [ ] Diarrhea [ ] Constipation [ ] Hemorrhoids [ ] Nausea [ ] Vomiting  Genitourinary: [ ] Nocturia [ ] Dysuria [ ] Incontinence  Extremities: [ ] Swelling [ ] Joint Pain  Neurologic: [ ] Focal deficit [ ] Paresthesias [ ] Syncope  Lymphatic: [ ] Swelling [ ] Lymphadenopathy   Skin: [ ] Rash [ ] Ecchymoses [ ] Wounds [ ] Lesions  Psychiatry: [ ] Depression [ ] Suicidal/Homicidal Ideation [ ] Anxiety [ ] Sleep Disturbances  [x ] 10 point review of systems is otherwise negative except as mentioned above              [ ]Unable to obtain due to   All other systems were reviewed and are negative, except as noted.      VITALS/PHYSICAL EXAM  --------------------------------------------------------------------------------  T(C): 36.6 (10-20-21 @ 10:50), Max: 36.7 (10-20-21 @ 07:07)  HR: 88 (10-20-21 @ 10:50) (76 - 88)  BP: 113/69 (10-20-21 @ 10:50) (113/69 - 140/70)  RR: 17 (10-20-21 @ 10:50) (17 - 18)  SpO2: 100% (10-20-21 @ 10:50) (98% - 100%)  Wt(kg): --  Height (cm): 157.5 (10-20-21 @ 07:07)  Weight (kg): 51.7 (10-20-21 @ 07:07)  BMI (kg/m2): 20.8 (10-20-21 @ 07:07)  BSA (m2): 1.51 (10-20-21 @ 07:07)      Physical Exam:  	Gen: NAD, well-appearing  	HEENT: on room air  	Pulm: CTA B/L  	CV: normal S1S2; no rub  	Abd: soft                      Back : No sacral edema  	: No topete  	LE: no edema  	Skin: Warm, without rashes  	Vascular access: LUE AVF in situ with good thrill and bruit    LABS/STUDIES  --------------------------------------------------------------------------------              7.5    7.38  >-----------<  123      [10-20-21 @ 08:42]              25.1     135  |  97  |  33  ----------------------------<  90      [10-20-21 @ 08:42]  5.3   |  20  |  5.51        Ca     9.2     [10-20-21 @ 08:42]    TPro  8.2  /  Alb  4.7  /  TBili  2.1  /  DBili  x   /  AST  24  /  ALT  10  /  AlkPhos  160  [10-20-21 @ 08:42]    PT/INR: PT 12.9 , INR 1.08       [10-20-21 @ 08:42]  PTT: 26.8       [10-20-21 @ 08:42]      Creatinine Trend:  SCr 5.51 [10-20 @ 08:42]  SCr 4.37 [09-30 @ 22:50]    Urinalysis - [01-09-21 @ 12:32]      Color Yellow / Appearance Slightly Turbid / SG 1.021 / pH 8.0      Gluc Trace / Ketone Trace  / Bili Negative / Urobili Negative       Blood Trace / Protein 300 mg/dL / Leuk Est Large / Nitrite Negative      RBC 1 /  / Hyaline 8 / Gran  / Sq Epi  / Non Sq Epi 18 / Bacteria Many      Iron 36, TIBC 152, %sat 23      [08-11-21 @ 09:35]  Ferritin 998      [08-11-21 @ 09:40]  HbA1c 4.9      [06-01-19 @ 00:46]  TSH 1.78      [09-08-21 @ 09:18]    HBsAb 9.5      [04-10-21 @ 23:35]  HBsAb Reactive      [07-29-21 @ 00:09]  HBsAg Nonreact      [07-29-21 @ 00:09]  HBcAb Nonreact      [07-29-21 @ 00:09]  HCV 0.19, Nonreact      [07-29-21 @ 00:09]  HIV Nonreact      [04-11-21 @ 08:31]    RAHUL: titer 1:80, pattern Homogeneous      [05-16-18 @ 22:43]  C3 Complement 100      [01-19-17 @ 20:01]  C4 Complement 32      [01-19-17 @ 20:01]  Rheumatoid Factor <7.0      [01-19-17 @ 20:01]  ANCA: cANCA Negative, pANCA Negative, atypical ANCA Negative      [01-19-17 @ 20:01]  anti-GBM <0.2      [01-20-17 @ 01:52]  ASLO 59      [01-19-17 @ 20:01]  Syphilis Screen (Treponema Pallidum Ab) Negative      [09-08-21 @ 09:08]  Free Light Chains: kappa 12.00, lambda 14.10, ratio = 0.85      [01-23 @ 14:39]  Immunofixation Serum:   No Monoclonal Band Identified      [01-23-17 @ 14:39]  SPEP Interpretation: Polyclonal Gammopathy      [01-23-17 @ 14:39]

## 2021-10-20 NOTE — H&P ADULT - PROBLEM SELECTOR PLAN 3
follows with Dr. De León. on Mon/Wed/Fri HD schedule, last session was Monday 10/18/21.  - Nephrology consult for maintenance HD  - she is due for her maintenance HD today, 10/20/21  - c/w calcium acetate TID with meals  - EPO with HD as per nephrology

## 2021-10-20 NOTE — H&P ADULT - PROBLEM SELECTOR PLAN 1
Anemic to 7.2-7.5 in last week, sent in by her hematology for tranfusion  - hematology consult appreciated  - plan for 1 unit PRBC with HD  - f/u with Nephrology re: scheduling her maintenance HD as below

## 2021-10-20 NOTE — H&P ADULT - PROBLEM SELECTOR PLAN 2
follows with Dr. Jacky Guo as outpatient  - c/w Jakafi 10mg Mon/Wed/Fri after HD  - patient does not have her home danazol here with her, will need to bring in if prolonged hospitalization

## 2021-10-20 NOTE — H&P ADULT - ASSESSMENT
60 yo female with PMH of Juan José 2+ polyythemia vera (on Jakafi), ESRD on HD MWF via LUE AVF, and HTN who presents to the ED for anemia on outside labs. Patient states, she has her blood work checked every week and her Hb was 7.2 last week and again 7.5 this week. Was told by her hematologist's office (Dr. Jacky uGo) to present to the ED for transfusion with HD.

## 2021-10-20 NOTE — H&P ADULT - HISTORY OF PRESENT ILLNESS
60 yo female with PMH of Juan José 2+ polyythemia vera (on Jakafi), ESRD on HD MWF via LUE AVF, and HTN who presents to the ED for anemia on outside labs. Patient states, she has her blood work checked every week and her Hb was 7.2 last week and again 7.5 this week. Was told by her hematologist's office (Dr. Jacky Guo) to present to the ED for transfusion with HD. No fever, chills, chest pain, nor dyspnea. States had transient lightheadedness last week but has since improved/resolved. States went for a fistula study yesterday with her vascular surgeon for excessive bleeding from her AVF during HD sessions, but otherwise doing well.     In the ED, vitals stable. Labs notable for Hb 7.5, Cr consistent with her known ESRD. Admitted for anemia requiring transfusion with HD.  60 yo female with PMH of Juan José 2+ polyythemia vera (on Jakafi), ESRD on HD MWF via LUE AVF, and HTN who presents to the ED for anemia on outside labs. Patient states, she has her blood work checked every week and her Hb was 7.2 last week and again 7.5 this week. Was told by her hematologist's office (Dr. Jacky Guo) to present to the ED for transfusion with HD. No fever, chills, chest pain, nor dyspnea. States had transient lightheadedness last week but has since improved/resolved. States went for ballooning of her fistula yesterday with her vascular surgeon for excessive bleeding from her AVF during HD sessions, but otherwise doing well.     In the ED, vitals stable. Labs notable for Hb 7.5, Cr consistent with her known ESRD. Admitted for anemia requiring transfusion with HD.

## 2021-10-20 NOTE — ED ADULT NURSE NOTE - OBJECTIVE STATEMENT
60 y/o female coming in from home complaining of low hemoglobi, 60 y/o female coming in from home complaining of low hemoglobin. AOx4, ambulatory, PMH polycythemia on dialysis M,W,F, HTN. Pt. reports she gets her blood checked every week and had hemoglobin of 7.2 resulted on Wednesday of last week. Reports some dizziness, denies chest pain, SOB. Pt. also states they sent her here for a transfusion due to being unable to obtain a peripheral IV at the dialysis center. Denies any other complaints. Abdomen soft, non-distended, non-tender to palpation. Left AV fistula noted. Pt. is well appearing with stable VS. Denies fevers, chills, cough, N/V/D. Will continue to reassess.

## 2021-10-20 NOTE — H&P ADULT - NSHPLABSRESULTS_GEN_ALL_CORE
Labs reviewed:                         7.5    7.38  )-----------( 123      ( 20 Oct 2021 08:42 )             25.1     10-20    135  |  97  |  33<H>  ----------------------------<  90  5.3   |  20<L>  |  5.51<H>    Ca    9.2      20 Oct 2021 08:42    TPro  8.2  /  Alb  4.7  /  TBili  2.1<H>  /  DBili  x   /  AST  24  /  ALT  10  /  AlkPhos  160<H>  10-20    PT/INR - ( 20 Oct 2021 08:42 )   PT: 12.9 sec;   INR: 1.08 ratio         PTT - ( 20 Oct 2021 08:42 )  PTT:26.8 sec        Imaging personally reviewed:    ECG reviewed and interpreted:

## 2021-10-20 NOTE — ED ADULT NURSE NOTE - NS ED PATIENT SAFETY CONCERN
Alert and oriented to person, place and time, memory intact, behavior appropriate to situation, PERRL.
No

## 2021-10-20 NOTE — CONSULT NOTE ADULT - ASSESSMENT
61F with PMH/PSH including JAK2+ polycythemia vera, HTN, ESRD on MWF HD, noncirrhotic portal HTN c/b gastric varices, COVID infection 4/2021 presents with anemia of 7.5 requiring transfusion.      #Myelofibrosis, history of JAK2 positive PV  #Anemia  -Hematology history as above.  -Patient on treatment with Jakafi 10mg TIW, recently started on danazol.  -Continue Jakafi during hospital stay to avoid discontinuation syndrome. Pt did not bring home med danazol, but if hospital stay gets longer will need to start on it as well.  -Transfuse to Hb >8.0.   -No objection to discharge after receiving transfusion at heme standpoint, continue f/u at [facility] with Dr. Jacky Guo post discharge.      Pau Guzmán MD  PGY 6, Oncology/Hematology fellow  (P) [phone]  After 5pm, please contact on-call team.
61 year old female with ESRD on HD, myelofibrosis, polycythemia vera, here for blood transfusion due to low H/H.    ESRD on HD: pt. has been on HD for over 4 years, and gets it TIW at [facility] dialysis [place] (nephrologist Dr. De León). Had last maintenance HD as outpatient on 10/18 w/o any reported intradialytic complications, and had ballooning of AVF done yesterday at the access center.   Appears clinically stable, labs pending.   Plan for maintenance HD today.    Anemia: in the setting of ESRD, Mf and PV, being managed by hematology  plan for PRBC transfusion with Hd today.   Will resume outpatient dose of INDER with HD (22431 units TIW with HD)    bone mineral disease: pt. on Ca acetate, and hectorol.  continue Ca acetate 2 tabs TID with meals  continue hectorol (4 mcg TIW with HD)  monitor Ca, phos levels    Ceci Fajardo MD  Cell : [phone]  Pager : [phone]  Office : [phone]

## 2021-10-21 ENCOUNTER — TRANSCRIPTION ENCOUNTER (OUTPATIENT)
Age: 61
End: 2021-10-21

## 2021-10-21 LAB
ANION GAP SERPL CALC-SCNC: 14 MMOL/L — SIGNIFICANT CHANGE UP (ref 5–17)
ANTIBODY ID 1_1: SIGNIFICANT CHANGE UP
ANTIBODY ID 1_2: SIGNIFICANT CHANGE UP
ANTIBODY ID 1_3: SIGNIFICANT CHANGE UP
BUN SERPL-MCNC: 43 MG/DL — HIGH (ref 7–23)
CALCIUM SERPL-MCNC: 8.5 MG/DL — SIGNIFICANT CHANGE UP (ref 8.4–10.5)
CHLORIDE SERPL-SCNC: 104 MMOL/L — SIGNIFICANT CHANGE UP (ref 96–108)
CO2 SERPL-SCNC: 18 MMOL/L — LOW (ref 22–31)
COVID-19 SPIKE DOMAIN AB INTERP: POSITIVE
COVID-19 SPIKE DOMAIN ANTIBODY RESULT: >250 U/ML — HIGH
CREAT SERPL-MCNC: 6.83 MG/DL — HIGH (ref 0.5–1.3)
GLUCOSE SERPL-MCNC: 72 MG/DL — SIGNIFICANT CHANGE UP (ref 70–99)
HCT VFR BLD CALC: 20 % — CRITICAL LOW (ref 34.5–45)
HCT VFR BLD CALC: 30.3 % — LOW (ref 34.5–45)
HGB BLD-MCNC: 6.2 G/DL — CRITICAL LOW (ref 11.5–15.5)
HGB BLD-MCNC: 9.3 G/DL — LOW (ref 11.5–15.5)
MAGNESIUM SERPL-MCNC: 2.2 MG/DL — SIGNIFICANT CHANGE UP (ref 1.6–2.6)
MCHC RBC-ENTMCNC: 29.5 PG — SIGNIFICANT CHANGE UP (ref 27–34)
MCHC RBC-ENTMCNC: 30.5 PG — SIGNIFICANT CHANGE UP (ref 27–34)
MCHC RBC-ENTMCNC: 30.7 GM/DL — LOW (ref 32–36)
MCHC RBC-ENTMCNC: 31 GM/DL — LOW (ref 32–36)
MCV RBC AUTO: 96.2 FL — SIGNIFICANT CHANGE UP (ref 80–100)
MCV RBC AUTO: 98.5 FL — SIGNIFICANT CHANGE UP (ref 80–100)
NRBC # BLD: 3 /100 WBCS — HIGH (ref 0–0)
NRBC # BLD: 3 /100 WBCS — HIGH (ref 0–0)
PHOSPHATE SERPL-MCNC: 4.1 MG/DL — SIGNIFICANT CHANGE UP (ref 2.5–4.5)
PLATELET # BLD AUTO: 105 K/UL — LOW (ref 150–400)
PLATELET # BLD AUTO: 141 K/UL — LOW (ref 150–400)
POTASSIUM SERPL-MCNC: 5 MMOL/L — SIGNIFICANT CHANGE UP (ref 3.5–5.3)
POTASSIUM SERPL-SCNC: 5 MMOL/L — SIGNIFICANT CHANGE UP (ref 3.5–5.3)
RBC # BLD: 2.03 M/UL — LOW (ref 3.8–5.2)
RBC # BLD: 3.15 M/UL — LOW (ref 3.8–5.2)
RBC # FLD: 19.8 % — HIGH (ref 10.3–14.5)
RBC # FLD: 20.3 % — HIGH (ref 10.3–14.5)
SARS-COV-2 IGG+IGM SERPL QL IA: >250 U/ML — HIGH
SARS-COV-2 IGG+IGM SERPL QL IA: POSITIVE
SODIUM SERPL-SCNC: 136 MMOL/L — SIGNIFICANT CHANGE UP (ref 135–145)
WBC # BLD: 6.27 K/UL — SIGNIFICANT CHANGE UP (ref 3.8–10.5)
WBC # BLD: 8.82 K/UL — SIGNIFICANT CHANGE UP (ref 3.8–10.5)
WBC # FLD AUTO: 6.27 K/UL — SIGNIFICANT CHANGE UP (ref 3.8–10.5)
WBC # FLD AUTO: 8.82 K/UL — SIGNIFICANT CHANGE UP (ref 3.8–10.5)

## 2021-10-21 PROCEDURE — 86077 PHYS BLOOD BANK SERV XMATCH: CPT

## 2021-10-21 PROCEDURE — 90935 HEMODIALYSIS ONE EVALUATION: CPT | Mod: GC

## 2021-10-21 RX ADMIN — Medication 1 TABLET(S): at 14:26

## 2021-10-21 RX ADMIN — Medication 1334 MILLIGRAM(S): at 17:11

## 2021-10-21 RX ADMIN — Medication 650 MILLIGRAM(S): at 22:02

## 2021-10-21 RX ADMIN — Medication 1334 MILLIGRAM(S): at 14:26

## 2021-10-21 RX ADMIN — Medication 650 MILLIGRAM(S): at 21:21

## 2021-10-21 RX ADMIN — SENNA PLUS 2 TABLET(S): 8.6 TABLET ORAL at 21:05

## 2021-10-21 NOTE — DISCHARGE NOTE PROVIDER - NSDCMRMEDTOKEN_GEN_ALL_CORE_FT
calcium acetate 667 mg oral capsule: 2 tab(s) orally 3 times a day  epoetin filiberto: 42285 unit(s) intravenous Monday, Wednesday, and Friday  Hectorol 2 mcg/mL intravenous solution: 4 microgram(s) intravenous 3 times a week  Jakafi 10 mg oral tablet: 1 tab(s) orally 3 times a week after hemodialysis Monday, Wednesday, Friday  melatonin 3 mg oral tablet: 1 tab(s) orally once a day (at bedtime), As needed, Insomnia  Nephro-Ben oral tablet: 1 tab(s) orally once a day  propranolol 10 mg oral tablet: 1 tab(s) orally once a day  senna oral tablet: 2 tab(s) orally once a day (at bedtime)   acetaminophen 325 mg oral tablet: 2 tab(s) orally every 6 hours, As needed, Temp greater or equal to 38C (100.4F), Mild Pain (1 - 3)  calcium acetate 667 mg oral capsule: 2 tab(s) orally 3 times a day  epoetin filiberto: 01239 unit(s) intravenous Monday, Wednesday, and Friday  Jakafi 10 mg oral tablet: 1 tab(s) orally 3 times a week after hemodialysis Monday, Wednesday, Friday  melatonin 3 mg oral tablet: 1 tab(s) orally once a day (at bedtime), As needed, Insomnia  Nephro-Ben oral tablet: 1 tab(s) orally once a day  propranolol 10 mg oral tablet: 1 tab(s) orally once a day  senna oral tablet: 2 tab(s) orally once a day (at bedtime)

## 2021-10-21 NOTE — DISCHARGE NOTE PROVIDER - NSDCCPCAREPLAN_GEN_ALL_CORE_FT
PRINCIPAL DISCHARGE DIAGNOSIS  Diagnosis: Anemia  Assessment and Plan of Treatment: S/p 1 unit pack red blood cells      SECONDARY DISCHARGE DIAGNOSES  Diagnosis: ESRD on hemodialysis  Assessment and Plan of Treatment: Avoid taking (NSAIDs) - (ex: Ibuprofen, Advil, Celebrex, Naprosyn)  Avoid taking any nephrotoxic agents (can harm kidneys) - Intravenous contrast for diagnostic testing, combination cold medications.  Have all medications adjusted for your renal function by your Health Care Provider.  Blood pressure control is important.  Take all medication as prescribed.      Diagnosis: Hypertension  Assessment and Plan of Treatment: Continue home meds    Diagnosis: Polycythemia vera  Assessment and Plan of Treatment: Follow up with hematologist     PRINCIPAL DISCHARGE DIAGNOSIS  Diagnosis: Anemia  Assessment and Plan of Treatment: S/p 1 unit pack red blood cells- repeat 9.2 / 28.1. Please follow up with your Hematologist      SECONDARY DISCHARGE DIAGNOSES  Diagnosis: Polycythemia vera  Assessment and Plan of Treatment: Follow up with hematologist  c/w Derik 10mg Mon/Wed/Fri after HD      Diagnosis: Hypertension  Assessment and Plan of Treatment: Continue home meds  Low salt diet  Activity as tolerated.  Take all medication as prescribed.  Follow up with your medical doctor for routine blood pressure monitoring at your next visit.  Notify your doctor if you have any of the following symptoms:   Dizziness, Lightheadedness, Blurry vision, Headache, Chest pain, Shortness of breath      Diagnosis: ESRD on hemodialysis  Assessment and Plan of Treatment: Avoid taking (NSAIDs) - (ex: Ibuprofen, Advil, Celebrex, Naprosyn)  Avoid taking any nephrotoxic agents (can harm kidneys) - Intravenous contrast for diagnostic testing, combination cold medications.  Have all medications adjusted for your renal function by your Health Care Provider.  Blood pressure control is important.  Take all medication as prescribed.

## 2021-10-21 NOTE — DISCHARGE NOTE PROVIDER - CARE PROVIDER_API CALL
Jacky Guo)  Hematology; Internal Medicine; Oncology  [address], [place] [id number]  Phone: [phone]  Fax: [phone]  Follow Up Time:     Azucena De León)  Internal Medicine; Nephrology  [address] [facility] [address] [place] [address]  Phone: [phone]  Fax: [phone]  Follow Up Time:

## 2021-10-21 NOTE — DISCHARGE NOTE PROVIDER - HOSPITAL COURSE
62 y/o female with PMH of Juan José 2+ polythemia vera (on Jakafi), ESRD on HD M/W/F via LUE AVF, and HTN who presents to the ED for anemia on outside labs. Patient states, she has her blood work checked every week and her Hb was 7.2 last week and again 7.5 this week. Was told by her hematologist's office (Dr. Jacky Guo) to present to the ED for transfusion with HD.      ·  Problem: Anemia.   ·  Plan: Anemic to 7.2-7.5 in last week, sent in by her hematology for transfusion  - hematology consult appreciated- S/p PRBC.      ·  Problem: Polycythemia vera.   ·  Plan: follows with Dr. Jacky Guo as outpatient  - c/w Jakafi 10mg Mon/Wed/Fri after HD  - patient does not have her home danazol here with her, will need to bring in if prolonged hospitalization.      ·  Problem: ESRD on hemodialysis.   ·  Plan: follows with Dr. De León. on Mon/Wed/Fri HD schedule, last session was Monday 10/18/21.  - Nephrology f/up appreciated  - c/w calcium acetate TID with meals  - EPO with HD as per nephrology.      ·  Problem: Hypertension.   ·  Plan: stable  - c/w home propanolol.     Pt is medically stable for discharge and to follow up with her PCP and nephrologist

## 2021-10-21 NOTE — DISCHARGE NOTE PROVIDER - NSDCFUSCHEDAPPT_GEN_ALL_CORE_FT
HU, MIHYEON ; 10/27/2021 ; [facility]  HU, MIHYEON ; 11/18/2021 ; [facility]  HU, MIHYEON ; 12/16/2021 ; [facility] [address] HU, MIHYEON ; 12/16/2021 ; [facility] [address]

## 2021-10-21 NOTE — DISCHARGE NOTE PROVIDER - CARE PROVIDERS DIRECT ADDRESSES
[email or web address]

## 2021-10-21 NOTE — DISCHARGE NOTE PROVIDER - NSDCFUADDAPPT_GEN_ALL_CORE_FT
Follow up with nephrologist and report to your HD site as scheuled. Follow up with nephrologist and report to your HD site as scheduled.

## 2021-10-22 ENCOUNTER — TRANSCRIPTION ENCOUNTER (OUTPATIENT)
Age: 61
End: 2021-10-22

## 2021-10-22 VITALS
HEART RATE: 92 BPM | TEMPERATURE: 98 F | DIASTOLIC BLOOD PRESSURE: 73 MMHG | RESPIRATION RATE: 18 BRPM | SYSTOLIC BLOOD PRESSURE: 110 MMHG | OXYGEN SATURATION: 97 %

## 2021-10-22 LAB
ANION GAP SERPL CALC-SCNC: 18 MMOL/L — HIGH (ref 5–17)
BUN SERPL-MCNC: 25 MG/DL — HIGH (ref 7–23)
CALCIUM SERPL-MCNC: 9 MG/DL — SIGNIFICANT CHANGE UP (ref 8.4–10.5)
CHLORIDE SERPL-SCNC: 93 MMOL/L — LOW (ref 96–108)
CO2 SERPL-SCNC: 24 MMOL/L — SIGNIFICANT CHANGE UP (ref 22–31)
CREAT SERPL-MCNC: 4.76 MG/DL — HIGH (ref 0.5–1.3)
GLUCOSE SERPL-MCNC: 136 MG/DL — HIGH (ref 70–99)
HCT VFR BLD CALC: 28.1 % — LOW (ref 34.5–45)
HGB BLD-MCNC: 9.2 G/DL — LOW (ref 11.5–15.5)
MCHC RBC-ENTMCNC: 31 PG — SIGNIFICANT CHANGE UP (ref 27–34)
MCHC RBC-ENTMCNC: 32.7 GM/DL — SIGNIFICANT CHANGE UP (ref 32–36)
MCV RBC AUTO: 94.6 FL — SIGNIFICANT CHANGE UP (ref 80–100)
NRBC # BLD: 2 /100 WBCS — HIGH (ref 0–0)
PLATELET # BLD AUTO: 111 K/UL — LOW (ref 150–400)
POTASSIUM SERPL-MCNC: 4.8 MMOL/L — SIGNIFICANT CHANGE UP (ref 3.5–5.3)
POTASSIUM SERPL-SCNC: 4.8 MMOL/L — SIGNIFICANT CHANGE UP (ref 3.5–5.3)
RBC # BLD: 2.97 M/UL — LOW (ref 3.8–5.2)
RBC # FLD: 19.9 % — HIGH (ref 10.3–14.5)
SODIUM SERPL-SCNC: 135 MMOL/L — SIGNIFICANT CHANGE UP (ref 135–145)
WBC # BLD: 7.35 K/UL — SIGNIFICANT CHANGE UP (ref 3.8–10.5)
WBC # FLD AUTO: 7.35 K/UL — SIGNIFICANT CHANGE UP (ref 3.8–10.5)

## 2021-10-22 PROCEDURE — 86880 COOMBS TEST DIRECT: CPT

## 2021-10-22 PROCEDURE — 96375 TX/PRO/DX INJ NEW DRUG ADDON: CPT

## 2021-10-22 PROCEDURE — 90935 HEMODIALYSIS ONE EVALUATION: CPT | Mod: GC

## 2021-10-22 PROCEDURE — 86850 RBC ANTIBODY SCREEN: CPT

## 2021-10-22 PROCEDURE — 84100 ASSAY OF PHOSPHORUS: CPT

## 2021-10-22 PROCEDURE — 85610 PROTHROMBIN TIME: CPT

## 2021-10-22 PROCEDURE — 96374 THER/PROPH/DIAG INJ IV PUSH: CPT

## 2021-10-22 PROCEDURE — P9040: CPT

## 2021-10-22 PROCEDURE — 85027 COMPLETE CBC AUTOMATED: CPT

## 2021-10-22 PROCEDURE — 36430 TRANSFUSION BLD/BLD COMPNT: CPT

## 2021-10-22 PROCEDURE — 99285 EMERGENCY DEPT VISIT HI MDM: CPT

## 2021-10-22 PROCEDURE — 86901 BLOOD TYPING SEROLOGIC RH(D): CPT

## 2021-10-22 PROCEDURE — 86870 RBC ANTIBODY IDENTIFICATION: CPT

## 2021-10-22 PROCEDURE — 86900 BLOOD TYPING SEROLOGIC ABO: CPT

## 2021-10-22 PROCEDURE — 80048 BASIC METABOLIC PNL TOTAL CA: CPT

## 2021-10-22 PROCEDURE — 86769 SARS-COV-2 COVID-19 ANTIBODY: CPT

## 2021-10-22 PROCEDURE — U0003: CPT

## 2021-10-22 PROCEDURE — 86922 COMPATIBILITY TEST ANTIGLOB: CPT

## 2021-10-22 PROCEDURE — U0005: CPT

## 2021-10-22 PROCEDURE — 93005 ELECTROCARDIOGRAM TRACING: CPT

## 2021-10-22 PROCEDURE — 99261: CPT

## 2021-10-22 PROCEDURE — 80053 COMPREHEN METABOLIC PANEL: CPT

## 2021-10-22 PROCEDURE — 86860 RBC ANTIBODY ELUTION: CPT

## 2021-10-22 PROCEDURE — 86902 BLOOD TYPE ANTIGEN DONOR EA: CPT

## 2021-10-22 PROCEDURE — 36415 COLL VENOUS BLD VENIPUNCTURE: CPT

## 2021-10-22 PROCEDURE — 85025 COMPLETE CBC W/AUTO DIFF WBC: CPT

## 2021-10-22 PROCEDURE — 83735 ASSAY OF MAGNESIUM: CPT

## 2021-10-22 PROCEDURE — 85730 THROMBOPLASTIN TIME PARTIAL: CPT

## 2021-10-22 RX ORDER — DOXERCALCIFEROL 2.5 UG/1
4 CAPSULE ORAL
Qty: 0 | Refills: 0 | DISCHARGE

## 2021-10-22 RX ORDER — ACETAMINOPHEN 500 MG
2 TABLET ORAL
Qty: 0 | Refills: 0 | DISCHARGE
Start: 2021-10-22

## 2021-10-22 RX ORDER — SENNA PLUS 8.6 MG/1
2 TABLET ORAL
Qty: 0 | Refills: 0 | DISCHARGE
Start: 2021-10-22

## 2021-10-22 RX ADMIN — Medication 1334 MILLIGRAM(S): at 08:18

## 2021-10-22 RX ADMIN — Medication 1 TABLET(S): at 15:33

## 2021-10-22 RX ADMIN — RUXOLITINIB 10 MILLIGRAM(S): 25 TABLET ORAL at 15:34

## 2021-10-22 RX ADMIN — Medication 1334 MILLIGRAM(S): at 15:33

## 2021-10-22 NOTE — PROGRESS NOTE ADULT - ASSESSMENT
Pt. with ESRD on HD, admitted with low H/H in the setting of myelofibrosis.     Seen on maintenance HD  Tolerating HD well  Receiving 1 PRBC with HD today  Plan for next maintenance HD tomorrow  Monitor BMP and CBC    Ceci Fajardo MD  Cell : [phone]  Pager : [phone]  Office : [phone]  
Pt. with ESRD on HD, admitted with low H/H in the setting of myelofibrosis.     Seen on maintenance HD  Tolerating HD well through her LUE AVF  s/p 1 PRBC with HD yesterday and Hg levels have improved   --- as per hematology goal >8  Planned for HD again today as per her maintenance schedule   the patient follows with [facility] for HD as outpatient   Monitor BMP and CBC    Anemia:   in setting of myelofibrosis   - transfuse to goal Hg >8 as per hematology   - will need hematology follow up following discharge   - Hg this AM 9.2 following PRBC transfusion with HD yesterday   
60 yo female with PMH of Juan José 2+ polyythemia vera (on Jakafi), ESRD on HD MWF via LUE AVF, and HTN who presents to the ED for anemia on outside labs. Patient states, she has her blood work checked every week and her Hb was 7.2 last week and again 7.5 this week. Was told by her hematologist's office (Dr. Jacky Guo) to present to the ED for transfusion with HD.
62 yo female with PMH of Juan José 2+ polyythemia vera (on Jakafi), ESRD on HD MWF via LUE AVF, and HTN who presents to the ED for anemia on outside labs. Patient states, she has her blood work checked every week and her Hb was 7.2 last week and again 7.5 this week. Was told by her hematologist's office (Dr. Jacky Guo) to present to the ED for transfusion with HD.

## 2021-10-22 NOTE — PROGRESS NOTE ADULT - SUBJECTIVE AND OBJECTIVE BOX
[facility] DIVISION OF KIDNEY DISEASES AND HYPERTENSION -- HEMODIALYSIS NOTE  --------------------------------------------------------------------------------  Chief Complaint: ESRD/Ongoing hemodialysis requirement    24 hour events/subjective: seen on HD        PAST HISTORY  --------------------------------------------------------------------------------  No significant changes to PMH, PSH, FHx, SHx, unless otherwise noted    ALLERGIES & MEDICATIONS  --------------------------------------------------------------------------------  Allergies    No Known Allergies    Intolerances      Standing Inpatient Medications  calcium acetate 1334 milliGRAM(s) Oral three times a day with meals  influenza   Vaccine 0.5 milliLiter(s) IntraMuscular once  Nephro-deonna 1 Tablet(s) Oral daily  propranolol 10 milliGRAM(s) Oral daily  ruxolitinib 10 milliGRAM(s) Oral <User Schedule>  senna 2 Tablet(s) Oral at bedtime    PRN Inpatient Medications  acetaminophen   Tablet .. 650 milliGRAM(s) Oral every 6 hours PRN  aluminum hydroxide/magnesium hydroxide/simethicone Suspension 30 milliLiter(s) Oral every 4 hours PRN  melatonin 3 milliGRAM(s) Oral at bedtime PRN  ondansetron Injectable 4 milliGRAM(s) IV Push every 8 hours PRN      REVIEW OF SYSTEMS: intermittent exertional SOB  --------------------------------------------------------------------------------  Constitutional: [ ] Fever [ ] Chills [ ] Fatigue [ ] Weight change   HEENT: [ ] Blurred vision [ ] Eye Pain [ ] Headache [ ] Runny nose [ ] Sore Throat   Respiratory: [ ] Cough [ ] Wheezing [ ] Shortness of breath  Cardiovascular: [ ] Chest Pain [ ] Palpitations [ ] SANTAMARIA [ ] PND [ ] Orthopnea  Gastrointestinal: [ ] Abdominal Pain [ ] Diarrhea [ ] Constipation [ ] Hemorrhoids [ ] Nausea [ ] Vomiting  Genitourinary: [ ] Nocturia [ ] Dysuria [ ] Incontinence  Extremities: [ ] Swelling [ ] Joint Pain  Neurologic: [ ] Focal deficit [ ] Paresthesias [ ] Syncope  Lymphatic: [ ] Swelling [ ] Lymphadenopathy   Skin: [ ] Rash [ ] Ecchymoses [ ] Wounds [ ] Lesions  Psychiatry: [ ] Depression [ ] Suicidal/Homicidal Ideation [ ] Anxiety [ ] Sleep Disturbances  [x ] 10 point review of systems is otherwise negative except as mentioned above              [ ]Unable to obtain due to   All other systems were reviewed and are negative, except as noted.      VITALS/PHYSICAL EXAM  --------------------------------------------------------------------------------  T(C): 36.7 (10-21-21 @ 10:15), Max: 36.9 (10-20-21 @ 22:35)  HR: 80 (10-21-21 @ 10:15) (80 - 92)  BP: 127/71 (10-21-21 @ 10:15) (118/72 - 146/84)  RR: 18 (10-21-21 @ 10:15) (16 - 18)  SpO2: 99% (10-21-21 @ 10:15) (98% - 100%)  Wt(kg): --  Height (cm): 157.5 (10-20-21 @ 22:35)  Weight (kg): 54.1 (10-20-21 @ 22:35)  BMI (kg/m2): 21.8 (10-20-21 @ 22:35)  BSA (m2): 1.53 (10-20-21 @ 22:35)      10-20-21 @ 07:01  -  10-21-21 @ 07:00  --------------------------------------------------------  IN: 240 mL / OUT: 0 mL / NET: 240 mL    10-21-21 @ 07:01  -  10-21-21 @ 13:19  --------------------------------------------------------  IN: 120 mL / OUT: 0 mL / NET: 120 mL      Physical Exam:  	Gen: NAD, well-appearing  	HEENT: on room air  	Pulm: CTA B/L  	CV: normal S1S2; no rub  	Abd: soft                      Back : No sacral edema  	: No topete  	LE: no edema  	Skin: Warm, without rashes  	Vascular access: LUE AVF connected to the HD machine    LABS/STUDIES  --------------------------------------------------------------------------------              6.2    6.27  >-----------<  105      [10-21-21 @ 07:05]              20.0     136  |  104  |  43  ----------------------------<  72      [10-21-21 @ 07:01]  5.0   |  18  |  6.83        Ca     8.5     [10-21-21 @ 07:01]      Mg     2.2     [10-21-21 @ 07:01]      Phos  4.1     [10-21-21 @ 07:01]    TPro  8.2  /  Alb  4.7  /  TBili  2.1  /  DBili  x   /  AST  24  /  ALT  10  /  AlkPhos  160  [10-20-21 @ 08:42]    PT/INR: PT 12.9 , INR 1.08       [10-20-21 @ 08:42]  PTT: 26.8       [10-20-21 @ 08:42]      Iron 36, TIBC 152, %sat 23      [08-11-21 @ 09:35]  Ferritin 998      [08-11-21 @ 09:40]  HbA1c 4.9      [06-01-19 @ 00:46]  TSH 1.78      [09-08-21 @ 09:18]    
[facility] DIVISION OF KIDNEY DISEASES AND HYPERTENSION -- FOLLOW UP NOTE  --------------------------------------------------------------------------------  Chief Complaint: ESRD     24 hour events/subjective:    seen and examined this morning   reports feeling well without any acute issues overnight   HD today     PAST HISTORY  --------------------------------------------------------------------------------  No significant changes to PMH, PSH, FHx, SHx, unless otherwise noted    ALLERGIES & MEDICATIONS  --------------------------------------------------------------------------------  Allergies    No Known Allergies    Intolerances      Standing Inpatient Medications  calcium acetate 1334 milliGRAM(s) Oral three times a day with meals  influenza   Vaccine 0.5 milliLiter(s) IntraMuscular once  Nephro-deonna 1 Tablet(s) Oral daily  propranolol 10 milliGRAM(s) Oral daily  ruxolitinib 10 milliGRAM(s) Oral <User Schedule>  senna 2 Tablet(s) Oral at bedtime    PRN Inpatient Medications  acetaminophen   Tablet .. 650 milliGRAM(s) Oral every 6 hours PRN  aluminum hydroxide/magnesium hydroxide/simethicone Suspension 30 milliLiter(s) Oral every 4 hours PRN  melatonin 3 milliGRAM(s) Oral at bedtime PRN  ondansetron Injectable 4 milliGRAM(s) IV Push every 8 hours PRN      REVIEW OF SYSTEMS      All other systems were reviewed and are negative, except as noted.    VITALS/PHYSICAL EXAM  --------------------------------------------------------------------------------  T(C): 36.6 (10-22-21 @ 10:45), Max: 37.1 (10-21-21 @ 14:44)  HR: 86 (10-22-21 @ 10:45) (80 - 105)  BP: 117/77 (10-22-21 @ 10:45) (91/62 - 117/77)  RR: 17 (10-22-21 @ 10:45) (17 - 18)  SpO2: 97% (10-22-21 @ 10:45) (97% - 99%)  Wt(kg): --  Height (cm): 157.5 (10-20-21 @ 22:35)  Weight (kg): 54.1 (10-20-21 @ 22:35)  BMI (kg/m2): 21.8 (10-20-21 @ 22:35)  BSA (m2): 1.53 (10-20-21 @ 22:35)      10-21-21 @ 07:01  -  10-22-21 @ 07:00  --------------------------------------------------------  IN: 541 mL / OUT: 1900 mL / NET: -1359 mL        Physical Exam:  	Gen: NAD  	HEENT: MMM  	Pulm: CTA B/L  	CV: S1S2  	Abd: Soft, +BS   	Ext: No LE edema B/L  	Neuro: Awake  	Skin: Warm and dry  	Vascular access: MARIETTA NIETO      LABS/STUDIES  --------------------------------------------------------------------------------              9.2    7.35  >-----------<  111      [10-22-21 @ 09:24]              28.1     135  |  93  |  25  ----------------------------<  136      [10-22-21 @ 09:24]  4.8   |  24  |  4.76        Ca     9.0     [10-22-21 @ 09:24]      Mg     2.2     [10-21-21 @ 07:01]      Phos  4.1     [10-21-21 @ 07:01]            Creatinine Trend:  SCr 4.76 [10-22 @ 09:24]  SCr 6.83 [10-21 @ 07:01]  SCr 5.51 [10-20 @ 08:42]  SCr 4.37 [09-30 @ 22:50]        Iron 36, TIBC 152, %sat 23      [08-11-21 @ 09:35]  Ferritin 998      [08-11-21 @ 09:40]  HbA1c 4.9      [06-01-19 @ 00:46]  TSH 1.78      [09-08-21 @ 09:18]      
Patient is a 61y old  Female who presents with a chief complaint of anemia (22 Oct 2021 12:49)      SUBJECTIVE / OVERNIGHT EVENTS:    Events noted.  CONSTITUTIONAL: No fever,  or fatigue  RESPIRATORY: No cough, wheezing,  No shortness of breath  CARDIOVASCULAR: No chest pain, palpitations,   GASTROINTESTINAL: No abdominal or epigastric pain.   NEUROLOGICAL: No headaches,     MEDICATIONS  (STANDING):  calcium acetate 1334 milliGRAM(s) Oral three times a day with meals  influenza   Vaccine 0.5 milliLiter(s) IntraMuscular once  Nephro-deonna 1 Tablet(s) Oral daily  propranolol 10 milliGRAM(s) Oral daily  ruxolitinib 10 milliGRAM(s) Oral <User Schedule>  senna 2 Tablet(s) Oral at bedtime    MEDICATIONS  (PRN):  acetaminophen   Tablet .. 650 milliGRAM(s) Oral every 6 hours PRN Temp greater or equal to 38C (100.4F), Mild Pain (1 - 3)  aluminum hydroxide/magnesium hydroxide/simethicone Suspension 30 milliLiter(s) Oral every 4 hours PRN Dyspepsia  melatonin 3 milliGRAM(s) Oral at bedtime PRN Insomnia  ondansetron Injectable 4 milliGRAM(s) IV Push every 8 hours PRN Nausea and/or Vomiting        CAPILLARY BLOOD GLUCOSE        I&O's Summary    21 Oct 2021 07:01  -  22 Oct 2021 07:00  --------------------------------------------------------  IN: 541 mL / OUT: 1900 mL / NET: -1359 mL    22 Oct 2021 07:01  -  23 Oct 2021 00:03  --------------------------------------------------------  IN: 900 mL / OUT: 2200 mL / NET: -1300 mL        T(C): 36.9 (10-22-21 @ 19:39), Max: 37 (10-22-21 @ 15:31)  HR: 92 (10-22-21 @ 19:39) (80 - 100)  BP: 110/73 (10-22-21 @ 19:39) (92/60 - 117/77)  RR: 18 (10-22-21 @ 19:39) (17 - 18)  SpO2: 97% (10-22-21 @ 19:39) (97% - 99%)    PHYSICAL EXAM:  GENERAL: NAD  NECK: Supple, No JVD  CHEST/LUNG: Clear to auscultation bilaterally; No wheezing.  HEART: Regular rate and rhythm; No murmurs, rubs, or gallops  ABDOMEN: Soft, Nontender, Nondistended; Bowel sounds present  EXTREMITIES:   No edema  NEUROLOGY: AAO X 3      LABS:                        9.2    7.35  )-----------( 111      ( 22 Oct 2021 09:24 )             28.1     10-22    135  |  93<L>  |  25<H>  ----------------------------<  136<H>  4.8   |  24  |  4.76<H>    Ca    9.0      22 Oct 2021 09:24  Phos  4.1     10-21  Mg     2.2     10-21              CAPILLARY BLOOD GLUCOSE            RADIOLOGY & ADDITIONAL TESTS:    Imaging Personally Reviewed:    Consultant(s) Notes Reviewed:      Care Discussed with Consultants/Other Providers:    Garrett Geiger MD, CMD, FACP    [address] [place] [address]  Office Tel: [phone]  Cell: [phone]  
Patient is a 61y old  Female who presents with a chief complaint of anemia (21 Oct 2021 19:01)      SUBJECTIVE / OVERNIGHT EVENTS:    Events noted.  CONSTITUTIONAL: No fever,  or fatigue  RESPIRATORY: No cough, wheezing,  No shortness of breath  CARDIOVASCULAR: No chest pain, palpitations, dizziness, or leg swelling  GASTROINTESTINAL: No abdominal or epigastric pain. No nausea, vomiting.  NEUROLOGICAL: No headaches,     MEDICATIONS  (STANDING):  calcium acetate 1334 milliGRAM(s) Oral three times a day with meals  influenza   Vaccine 0.5 milliLiter(s) IntraMuscular once  Nephro-deonna 1 Tablet(s) Oral daily  propranolol 10 milliGRAM(s) Oral daily  ruxolitinib 10 milliGRAM(s) Oral <User Schedule>  senna 2 Tablet(s) Oral at bedtime    MEDICATIONS  (PRN):  acetaminophen   Tablet .. 650 milliGRAM(s) Oral every 6 hours PRN Temp greater or equal to 38C (100.4F), Mild Pain (1 - 3)  aluminum hydroxide/magnesium hydroxide/simethicone Suspension 30 milliLiter(s) Oral every 4 hours PRN Dyspepsia  melatonin 3 milliGRAM(s) Oral at bedtime PRN Insomnia  ondansetron Injectable 4 milliGRAM(s) IV Push every 8 hours PRN Nausea and/or Vomiting        CAPILLARY BLOOD GLUCOSE        I&O's Summary    20 Oct 2021 07:01  -  21 Oct 2021 07:00  --------------------------------------------------------  IN: 240 mL / OUT: 0 mL / NET: 240 mL    21 Oct 2021 07:01  -  22 Oct 2021 00:41  --------------------------------------------------------  IN: 441 mL / OUT: 1900 mL / NET: -1459 mL        T(C): 37.1 (10-21-21 @ 23:36), Max: 37.1 (10-21-21 @ 14:44)  HR: 81 (10-21-21 @ 23:36) (80 - 105)  BP: 112/67 (10-21-21 @ 23:36) (91/62 - 127/71)  RR: 18 (10-21-21 @ 23:36) (18 - 18)  SpO2: 98% (10-21-21 @ 23:36) (97% - 99%)    PHYSICAL EXAM:  GENERAL: NAD  NECK: Supple, No JVD  CHEST/LUNG: Clear to auscultation bilaterally; No wheezing.  HEART: Regular rate and rhythm; No murmurs, rubs, or gallops  ABDOMEN: Soft, Nontender, Nondistended; Bowel sounds present  EXTREMITIES:   No edema  NEUROLOGY: AAO X 3      LABS:                        9.3    8.82  )-----------( 141      ( 21 Oct 2021 16:02 )             30.3     10-21    136  |  104  |  43<H>  ----------------------------<  72  5.0   |  18<L>  |  6.83<H>    Ca    8.5      21 Oct 2021 07:01  Phos  4.1     10-21  Mg     2.2     10-21    TPro  8.2  /  Alb  4.7  /  TBili  2.1<H>  /  DBili  x   /  AST  24  /  ALT  10  /  AlkPhos  160<H>  10-20    PT/INR - ( 20 Oct 2021 08:42 )   PT: 12.9 sec;   INR: 1.08 ratio         PTT - ( 20 Oct 2021 08:42 )  PTT:26.8 sec        CAPILLARY BLOOD GLUCOSE            RADIOLOGY & ADDITIONAL TESTS:    Imaging Personally Reviewed:    Consultant(s) Notes Reviewed:      Care Discussed with Consultants/Other Providers:    Garrett Geiger MD, CMD, FACP    [address] [place] [address]  Office Tel: [phone]  Cell: [phone]

## 2021-10-22 NOTE — PROGRESS NOTE ADULT - PROBLEM SELECTOR PLAN 2
follows with Dr. Jacky Guo as outpatient  - c/w Jakafi 10mg Mon/Wed/Fri after HD  - patient does not have her home danazol here with her, will need to bring in if prolonged hospitalization
follows with Dr. Jacky Guo as outpatient  - c/w Jakafi 10mg Mon/Wed/Fri after HD  - patient does not have her home danazol here with her, will need to bring in if prolonged hospitalization

## 2021-10-22 NOTE — PROGRESS NOTE ADULT - PROBLEM SELECTOR PLAN 3
follows with Dr. De León. on Mon/Wed/Fri HD schedule, last session was Monday 10/18/21.  - Nephrology f/up appreciated  - c/w calcium acetate TID with meals  - EPO with HD as per nephrology
follows with Dr. De León. on Mon/Wed/Fri HD schedule, last session was Monday 10/18/21.  - Nephrology f/up appreciated  - c/w calcium acetate TID with meals  - EPO with HD as per nephrology

## 2021-10-22 NOTE — PROGRESS NOTE ADULT - ATTENDING COMMENTS
Seen on maintenance HD  tolerating HD well  LUE AVF functioning well during HD  Bp stable during HD rounds  plan for next maintenance HD on Monday.    Ceci Fajardo MD  Cell : [phone]  Pager : [phone]  Office : [phone]
seen on maintenance HD  tolerating HD well  ACF functioning well during HD  BP maintained during HD  Plan for next maintenance HD session on Monday as outpatient

## 2021-10-22 NOTE — DISCHARGE NOTE NURSING/CASE MANAGEMENT/SOCIAL WORK - PATIENT PORTAL LINK FT
You can access the FollowMyHealth Patient Portal offered by [facility] by registering at the following website: [email or web address]. By joining [facility]’s FollowMyHealth portal, you will also be able to view your health information using other applications (apps) compatible with our system.

## 2021-10-22 NOTE — PROGRESS NOTE ADULT - PROBLEM SELECTOR PLAN 1
Anemic to 7.2-7.5 in last week, sent in by her hematology for tranfusion  - hematology consult appreciated  S/p PRBC
Anemic to 7.2-7.5 in last week, sent in by her hematology for tranfusion  - hematology consult appreciated  S/p PRBC

## 2021-10-22 NOTE — DISCHARGE NOTE NURSING/CASE MANAGEMENT/SOCIAL WORK - NSDCVIVACCINE_GEN_ALL_CORE_FT
influenza, injectable, quadrivalent, preservative free; 20-Nov-2014 12:44; Aston Ulloa (RN); [employer]; [id number]; IntraMuscular; Deltoid Left.; 0.5 milliLiter(s);   influenza, injectable, quadrivalent, preservative free; 05-Oct-2016 18:15; Rosario James (RN); [employer]; [id number]; IntraMuscular; Deltoid Left.; 0.5 milliLiter(s); VIS (VIS Published: 07-Aug-2015, VIS Presented: 05-Oct-2016);

## 2021-10-25 ENCOUNTER — OUTPATIENT (OUTPATIENT)
Dept: OUTPATIENT SERVICES | Facility: HOSPITAL | Age: 61
LOS: 1 days | Discharge: HOME | End: 2021-10-25

## 2021-10-25 ENCOUNTER — NON-APPOINTMENT (OUTPATIENT)
Age: 61
End: 2021-10-25

## 2021-10-25 DIAGNOSIS — Z99.2 DEPENDENCE ON RENAL DIALYSIS: Chronic | ICD-10-CM

## 2021-10-25 DIAGNOSIS — T85.898A OTHER SPECIFIED COMPLICATION OF OTHER INTERNAL PROSTHETIC DEVICES, IMPLANTS AND GRAFTS, INITIAL ENCOUNTER: Chronic | ICD-10-CM

## 2021-10-25 DIAGNOSIS — I77.0 ARTERIOVENOUS FISTULA, ACQUIRED: Chronic | ICD-10-CM

## 2021-10-26 NOTE — ASSESSMENT
[FreeTextEntry1] : Patient presents with prolonged bleeding and stenosis seen on duplex. plan for left arm fistulogram and possible intervention

## 2021-10-26 NOTE — REASON FOR VISIT
[Other ___] : a [unfilled] visit for [Prolonged Bleeding] : prolonged bleeding [FreeTextEntry2] : stenosis on duplex

## 2021-10-26 NOTE — HISTORY OF PRESENT ILLNESS
[] : left radiocephalic fistula [FreeTextEntry1] : Dr. Dempsey 9/18/17 [FreeTextEntry4] : Yesterday  [FreeTextEntry5] : Yesterday 10:15 [FreeTextEntry6] : Dr. Delgado

## 2021-10-27 ENCOUNTER — RESULT REVIEW (OUTPATIENT)
Age: 61
End: 2021-10-27

## 2021-10-27 ENCOUNTER — APPOINTMENT (OUTPATIENT)
Dept: HEMATOLOGY ONCOLOGY | Facility: CLINIC | Age: 61
End: 2021-10-27

## 2021-10-27 LAB
BASOPHILS # BLD AUTO: 0.28 K/UL — HIGH (ref 0–0.2)
BASOPHILS NFR BLD AUTO: 4 % — HIGH (ref 0–2)
EOSINOPHIL # BLD AUTO: 0.07 K/UL — SIGNIFICANT CHANGE UP (ref 0–0.5)
EOSINOPHIL NFR BLD AUTO: 1 % — SIGNIFICANT CHANGE UP (ref 0–6)
HCT VFR BLD CALC: 24.9 % — LOW (ref 34.5–45)
HGB BLD-MCNC: 7.6 G/DL — LOW (ref 11.5–15.5)
LYMPHOCYTES # BLD AUTO: 0.98 K/UL — LOW (ref 1–3.3)
LYMPHOCYTES # BLD AUTO: 14 % — SIGNIFICANT CHANGE UP (ref 13–44)
MCHC RBC-ENTMCNC: 30.9 G/DL — LOW (ref 32–36)
MCHC RBC-ENTMCNC: 30.9 PG — SIGNIFICANT CHANGE UP (ref 27–34)
MCV RBC AUTO: 100 FL — SIGNIFICANT CHANGE UP (ref 80–100)
METAMYELOCYTES # FLD: 1 % — HIGH (ref 0–0)
MONOCYTES # BLD AUTO: 0.56 K/UL — SIGNIFICANT CHANGE UP (ref 0–0.9)
MONOCYTES NFR BLD AUTO: 8 % — SIGNIFICANT CHANGE UP (ref 2–14)
MYELOCYTES NFR BLD: 4 % — HIGH (ref 0–0)
NEUTROPHILS # BLD AUTO: 4.75 K/UL — SIGNIFICANT CHANGE UP (ref 1.8–7.4)
NEUTROPHILS NFR BLD AUTO: 68 % — SIGNIFICANT CHANGE UP (ref 43–77)
NRBC # BLD: 2 /100 — HIGH (ref 0–0)
NRBC # BLD: SIGNIFICANT CHANGE UP /100 WBCS (ref 0–0)
PLAT MORPH BLD: NORMAL — SIGNIFICANT CHANGE UP
PLATELET # BLD AUTO: 96 K/UL — LOW (ref 150–400)
RBC # BLD: 2.49 M/UL — LOW (ref 3.8–5.2)
RBC # FLD: 19.7 % — HIGH (ref 10.3–14.5)
RBC BLD AUTO: SIGNIFICANT CHANGE UP
WBC # BLD: 6.98 K/UL — SIGNIFICANT CHANGE UP (ref 3.8–10.5)
WBC # FLD AUTO: 6.98 K/UL — SIGNIFICANT CHANGE UP (ref 3.8–10.5)

## 2021-10-28 ENCOUNTER — APPOINTMENT (OUTPATIENT)
Dept: INTERNAL MEDICINE | Facility: CLINIC | Age: 61
End: 2021-10-28

## 2021-10-30 ENCOUNTER — OUTPATIENT (OUTPATIENT)
Dept: OUTPATIENT SERVICES | Facility: HOSPITAL | Age: 61
LOS: 1 days | End: 2021-10-30
Payer: SELF-PAY

## 2021-10-30 DIAGNOSIS — Z99.2 DEPENDENCE ON RENAL DIALYSIS: Chronic | ICD-10-CM

## 2021-10-30 DIAGNOSIS — Z11.52 ENCOUNTER FOR SCREENING FOR COVID-19: ICD-10-CM

## 2021-10-30 DIAGNOSIS — T85.898A OTHER SPECIFIED COMPLICATION OF OTHER INTERNAL PROSTHETIC DEVICES, IMPLANTS AND GRAFTS, INITIAL ENCOUNTER: Chronic | ICD-10-CM

## 2021-10-30 DIAGNOSIS — I77.0 ARTERIOVENOUS FISTULA, ACQUIRED: Chronic | ICD-10-CM

## 2021-10-30 LAB — SARS-COV-2 RNA SPEC QL NAA+PROBE: SIGNIFICANT CHANGE UP

## 2021-10-30 PROCEDURE — U0005: CPT

## 2021-10-30 PROCEDURE — C9803: CPT

## 2021-10-30 PROCEDURE — U0003: CPT

## 2021-11-02 ENCOUNTER — RESULT REVIEW (OUTPATIENT)
Age: 61
End: 2021-11-02

## 2021-11-02 ENCOUNTER — OUTPATIENT (OUTPATIENT)
Dept: OUTPATIENT SERVICES | Facility: HOSPITAL | Age: 61
LOS: 1 days | End: 2021-11-02
Payer: SELF-PAY

## 2021-11-02 VITALS
OXYGEN SATURATION: 100 % | HEART RATE: 76 BPM | SYSTOLIC BLOOD PRESSURE: 103 MMHG | DIASTOLIC BLOOD PRESSURE: 62 MMHG | RESPIRATION RATE: 16 BRPM

## 2021-11-02 VITALS
DIASTOLIC BLOOD PRESSURE: 78 MMHG | WEIGHT: 114.64 LBS | HEIGHT: 62 IN | OXYGEN SATURATION: 100 % | TEMPERATURE: 98 F | RESPIRATION RATE: 16 BRPM | HEART RATE: 78 BPM | SYSTOLIC BLOOD PRESSURE: 124 MMHG

## 2021-11-02 DIAGNOSIS — T85.898A OTHER SPECIFIED COMPLICATION OF OTHER INTERNAL PROSTHETIC DEVICES, IMPLANTS AND GRAFTS, INITIAL ENCOUNTER: Chronic | ICD-10-CM

## 2021-11-02 DIAGNOSIS — I77.0 ARTERIOVENOUS FISTULA, ACQUIRED: Chronic | ICD-10-CM

## 2021-11-02 DIAGNOSIS — D75.81 MYELOFIBROSIS: ICD-10-CM

## 2021-11-02 DIAGNOSIS — Z99.2 DEPENDENCE ON RENAL DIALYSIS: Chronic | ICD-10-CM

## 2021-11-02 PROCEDURE — 77001 FLUOROGUIDE FOR VEIN DEVICE: CPT | Mod: 26

## 2021-11-02 PROCEDURE — C1769: CPT

## 2021-11-02 PROCEDURE — 76937 US GUIDE VASCULAR ACCESS: CPT

## 2021-11-02 PROCEDURE — C1894: CPT

## 2021-11-02 PROCEDURE — 36561 INSERT TUNNELED CV CATH: CPT

## 2021-11-02 PROCEDURE — C1788: CPT

## 2021-11-02 PROCEDURE — 77001 FLUOROGUIDE FOR VEIN DEVICE: CPT

## 2021-11-02 PROCEDURE — 76937 US GUIDE VASCULAR ACCESS: CPT | Mod: 26

## 2021-11-02 RX ORDER — SODIUM CHLORIDE 9 MG/ML
1000 INJECTION, SOLUTION INTRAVENOUS
Refills: 0 | Status: DISCONTINUED | OUTPATIENT
Start: 2021-11-02 | End: 2021-11-16

## 2021-11-02 RX ORDER — ONDANSETRON 8 MG/1
4 TABLET, FILM COATED ORAL ONCE
Refills: 0 | Status: DISCONTINUED | OUTPATIENT
Start: 2021-11-02 | End: 2021-11-16

## 2021-11-02 NOTE — PRE PROCEDURE NOTE - PRE PROCEDURE EVALUATION
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

## 2021-11-02 NOTE — ASU DISCHARGE PLAN (ADULT/PEDIATRIC) - ASU DC SPECIAL INSTRUCTIONSFT
Chest Port Placement    Discharge Instructions  - You had a chest port implanted in your chest.   - The port is ready for use.  - You may shower in 48 hours. No soaking or swimming for 2 weeks or until the site is completely healed.  - Keep the area covered and dry for the next 7 days. It may be removed by a chemotherapy nurse as needed for treatment.  - Do not perform any heavy lifting or put tension on the area for the next week or until the site is healed.  - You may resume your normal diet.  - You may resume your normal medications however you should wait 48 hours before restarting aspirin, plavix, or blood thinners.  - It is normal to experience some pain over the site for the next few days. You may take Tylenol for that pain. Do not take more frequently than every 6 hours and do not exceed more than 3000mg of Tylenol in a 24 hour period.    - You were given conscious sedation which may make you drowsy, therefore you need someone to stay with you until the morning following the procedure.  - Do not drive, engage in heavy lifting or strenuous activity, or drink any alcoholic beverages for the next 24 hours.   - You may resume normal activity in 24 hours.    Notify your primary physician and/or Interventional Radiology IMMEDIATELY if you experience any of the following       - Fever of 101F or 38C       - Chills or Rigors/ Shakes       - Swelling and/or Redness in the area around the port       - Worsening Pain       - Blood soaked bandages or worsening bleeding       - Lightheadedness and/or dizziness upon standing       - Chest Pain/ Tightness       - Shortness of Breath       - Difficulty walking    If you have a problem that you believe requires IMMEDIATE attention, please go to your NEAREST Emergency Room. If you believe your problem can safely wait until you speak to a physician, please call Interventional Radiology for any concerns. Chest Port Placement    Discharge Instructions  - You had a chest port implanted in your chest.   - The port is ready for use.  - You may shower in 48 hours. No soaking or swimming for 2 weeks or until the site is completely healed.  - Keep the area covered and dry for the next 7 days. It may be removed by a chemotherapy nurse as needed for treatment.  - Do not perform any heavy lifting or put tension on the area for the next week or until the site is healed.  - You may resume your normal diet.  - You may resume your normal medications however you should wait 48 hours before restarting aspirin, plavix, or blood thinners.  - It is normal to experience some pain over the site for the next few days. You may take Tylenol for that pain. Do not take more frequently than every 6 hours and do not exceed more than 3000mg of Tylenol in a 24 hour period.    - You were given conscious sedation which may make you drowsy, therefore you need someone to stay with you until the morning following the procedure.  - Do not drive, engage in heavy lifting or strenuous activity, or drink any alcoholic beverages for the next 24 hours.   - You may resume normal activity in 24 hours.    Notify your primary physician and/or Interventional Radiology IMMEDIATELY if you experience any of the following       - Fever of 101F or 38C       - Chills or Rigors/ Shakes       - Swelling and/or Redness in the area around the port       - Worsening Pain       - Blood soaked bandages or worsening bleeding       - Lightheadedness and/or dizziness upon standing       - Chest Pain/ Tightness       - Shortness of Breath       - Difficulty walking    If you have a problem that you believe requires IMMEDIATE attention, please go to your NEAREST Emergency Room. If you believe your problem can safely wait until you speak to a physician, please call Interventional Radiology for any concerns.    Please feel free to contact us at [phone] if any problems arise. After 6PM, Monday through Friday, on weekends and on holidays, please call [phone] and ask for the radiology resident on call to be paged.

## 2021-11-05 LAB
APTT BLD: 34.4 SEC
INR PPP: 0.99 RATIO
PT BLD: 11.7 SEC

## 2021-11-11 ENCOUNTER — INPATIENT (INPATIENT)
Facility: HOSPITAL | Age: 61
LOS: 1 days | Discharge: HOME | DRG: 840 | End: 2021-11-13
Attending: INTERNAL MEDICINE | Admitting: INTERNAL MEDICINE
Payer: MEDICAID

## 2021-11-11 VITALS
HEIGHT: 62 IN | DIASTOLIC BLOOD PRESSURE: 63 MMHG | RESPIRATION RATE: 16 BRPM | TEMPERATURE: 98 F | SYSTOLIC BLOOD PRESSURE: 106 MMHG | HEART RATE: 91 BPM | WEIGHT: 116.84 LBS | OXYGEN SATURATION: 100 %

## 2021-11-11 DIAGNOSIS — D75.81 MYELOFIBROSIS: ICD-10-CM

## 2021-11-11 DIAGNOSIS — I77.0 ARTERIOVENOUS FISTULA, ACQUIRED: Chronic | ICD-10-CM

## 2021-11-11 DIAGNOSIS — Z45.2 ENCOUNTER FOR ADJUSTMENT AND MANAGEMENT OF VASCULAR ACCESS DEVICE: ICD-10-CM

## 2021-11-11 DIAGNOSIS — T85.898A OTHER SPECIFIED COMPLICATION OF OTHER INTERNAL PROSTHETIC DEVICES, IMPLANTS AND GRAFTS, INITIAL ENCOUNTER: Chronic | ICD-10-CM

## 2021-11-11 DIAGNOSIS — Z99.2 DEPENDENCE ON RENAL DIALYSIS: Chronic | ICD-10-CM

## 2021-11-11 LAB
ALBUMIN SERPL ELPH-MCNC: 4.2 G/DL — SIGNIFICANT CHANGE UP (ref 3.3–5)
ALP SERPL-CCNC: 158 U/L — HIGH (ref 40–120)
ALT FLD-CCNC: 9 U/L — LOW (ref 10–45)
ANION GAP SERPL CALC-SCNC: 17 MMOL/L — SIGNIFICANT CHANGE UP (ref 5–17)
ANISOCYTOSIS BLD QL: SIGNIFICANT CHANGE UP
APTT BLD: 31.2 SEC — SIGNIFICANT CHANGE UP (ref 27.5–35.5)
AST SERPL-CCNC: 16 U/L — SIGNIFICANT CHANGE UP (ref 10–40)
BASE EXCESS BLDV CALC-SCNC: 3.9 MMOL/L — HIGH (ref -2–2)
BASOPHILS # BLD AUTO: 0.05 K/UL — SIGNIFICANT CHANGE UP (ref 0–0.2)
BASOPHILS NFR BLD AUTO: 0.9 % — SIGNIFICANT CHANGE UP (ref 0–2)
BILIRUB SERPL-MCNC: 1.9 MG/DL — HIGH (ref 0.2–1.2)
BLD GP AB SCN SERPL QL: POSITIVE — SIGNIFICANT CHANGE UP
BUN SERPL-MCNC: 31 MG/DL — HIGH (ref 7–23)
CA-I SERPL-SCNC: 1.1 MMOL/L — LOW (ref 1.15–1.33)
CALCIUM SERPL-MCNC: 9.1 MG/DL — SIGNIFICANT CHANGE UP (ref 8.4–10.5)
CHLORIDE BLDV-SCNC: 99 MMOL/L — SIGNIFICANT CHANGE UP (ref 96–108)
CHLORIDE SERPL-SCNC: 93 MMOL/L — LOW (ref 96–108)
CO2 BLDV-SCNC: 30 MMOL/L — HIGH (ref 22–26)
CO2 SERPL-SCNC: 22 MMOL/L — SIGNIFICANT CHANGE UP (ref 22–31)
CREAT SERPL-MCNC: 5.87 MG/DL — HIGH (ref 0.5–1.3)
DACRYOCYTES BLD QL SMEAR: SLIGHT — SIGNIFICANT CHANGE UP
DAT C3-SP REAG RBC QL: NEGATIVE — SIGNIFICANT CHANGE UP
ELLIPTOCYTES BLD QL SMEAR: SLIGHT — SIGNIFICANT CHANGE UP
EOSINOPHIL # BLD AUTO: 0.14 K/UL — SIGNIFICANT CHANGE UP (ref 0–0.5)
EOSINOPHIL NFR BLD AUTO: 2.6 % — SIGNIFICANT CHANGE UP (ref 0–6)
GAS PNL BLDV: 135 MMOL/L — LOW (ref 136–145)
GAS PNL BLDV: SIGNIFICANT CHANGE UP
GLUCOSE BLDV-MCNC: 93 MG/DL — SIGNIFICANT CHANGE UP (ref 70–99)
GLUCOSE SERPL-MCNC: 93 MG/DL — SIGNIFICANT CHANGE UP (ref 70–99)
HCO3 BLDV-SCNC: 29 MMOL/L — SIGNIFICANT CHANGE UP (ref 22–29)
HCT VFR BLD CALC: 22 % — LOW (ref 34.5–45)
HCT VFR BLDA CALC: 18 % — CRITICAL LOW (ref 34.5–46.5)
HGB BLD CALC-MCNC: 6.1 G/DL — CRITICAL LOW (ref 11.7–16.1)
HGB BLD-MCNC: 6.8 G/DL — CRITICAL LOW (ref 11.5–15.5)
INR BLD: 1.18 RATIO — HIGH (ref 0.88–1.16)
LACTATE BLDV-MCNC: 1 MMOL/L — SIGNIFICANT CHANGE UP (ref 0.7–2)
LYMPHOCYTES # BLD AUTO: 0.97 K/UL — LOW (ref 1–3.3)
LYMPHOCYTES # BLD AUTO: 18.4 % — SIGNIFICANT CHANGE UP (ref 13–44)
MACROCYTES BLD QL: SLIGHT — SIGNIFICANT CHANGE UP
MAGNESIUM SERPL-MCNC: 2.1 MG/DL — SIGNIFICANT CHANGE UP (ref 1.6–2.6)
MANUAL SMEAR VERIFICATION: SIGNIFICANT CHANGE UP
MCHC RBC-ENTMCNC: 30.5 PG — SIGNIFICANT CHANGE UP (ref 27–34)
MCHC RBC-ENTMCNC: 30.9 GM/DL — LOW (ref 32–36)
MCV RBC AUTO: 98.7 FL — SIGNIFICANT CHANGE UP (ref 80–100)
METAMYELOCYTES # FLD: 1.8 % — HIGH (ref 0–0)
MICROCYTES BLD QL: SLIGHT — SIGNIFICANT CHANGE UP
MONOCYTES # BLD AUTO: 0.09 K/UL — SIGNIFICANT CHANGE UP (ref 0–0.9)
MONOCYTES NFR BLD AUTO: 1.7 % — LOW (ref 2–14)
MYELOCYTES NFR BLD: 1.8 % — HIGH (ref 0–0)
NEUTROPHILS # BLD AUTO: 3.84 K/UL — SIGNIFICANT CHANGE UP (ref 1.8–7.4)
NEUTROPHILS NFR BLD AUTO: 69.3 % — SIGNIFICANT CHANGE UP (ref 43–77)
NEUTS BAND # BLD: 3.5 % — SIGNIFICANT CHANGE UP (ref 0–8)
NRBC # BLD: 4 /100 — HIGH (ref 0–0)
OVALOCYTES BLD QL SMEAR: SLIGHT — SIGNIFICANT CHANGE UP
PCO2 BLDV: 47 MMHG — HIGH (ref 39–42)
PH BLDV: 7.4 — SIGNIFICANT CHANGE UP (ref 7.32–7.43)
PLAT MORPH BLD: NORMAL — SIGNIFICANT CHANGE UP
PLATELET # BLD AUTO: 106 K/UL — LOW (ref 150–400)
PO2 BLDV: 23 MMHG — LOW (ref 25–45)
POIKILOCYTOSIS BLD QL AUTO: SLIGHT — SIGNIFICANT CHANGE UP
POLYCHROMASIA BLD QL SMEAR: SLIGHT — SIGNIFICANT CHANGE UP
POTASSIUM BLDV-SCNC: 4.9 MMOL/L — SIGNIFICANT CHANGE UP (ref 3.5–5.1)
POTASSIUM SERPL-MCNC: 4.6 MMOL/L — SIGNIFICANT CHANGE UP (ref 3.5–5.3)
POTASSIUM SERPL-SCNC: 4.6 MMOL/L — SIGNIFICANT CHANGE UP (ref 3.5–5.3)
PROT SERPL-MCNC: 8.1 G/DL — SIGNIFICANT CHANGE UP (ref 6–8.3)
PROTHROM AB SERPL-ACNC: 14 SEC — HIGH (ref 10.6–13.6)
RBC # BLD: 2.23 M/UL — LOW (ref 3.8–5.2)
RBC # FLD: 19.7 % — HIGH (ref 10.3–14.5)
RBC BLD AUTO: ABNORMAL
RH IG SCN BLD-IMP: POSITIVE — SIGNIFICANT CHANGE UP
SAO2 % BLDV: 34.8 % — LOW (ref 67–88)
SARS-COV-2 RNA SPEC QL NAA+PROBE: SIGNIFICANT CHANGE UP
SODIUM SERPL-SCNC: 132 MMOL/L — LOW (ref 135–145)
WBC # BLD: 5.28 K/UL — SIGNIFICANT CHANGE UP (ref 3.8–10.5)
WBC # FLD AUTO: 5.28 K/UL — SIGNIFICANT CHANGE UP (ref 3.8–10.5)

## 2021-11-11 PROCEDURE — 74177 CT ABD & PELVIS W/CONTRAST: CPT | Mod: 26,MA

## 2021-11-11 PROCEDURE — 71045 X-RAY EXAM CHEST 1 VIEW: CPT | Mod: 26

## 2021-11-11 PROCEDURE — 99285 EMERGENCY DEPT VISIT HI MDM: CPT | Mod: 25

## 2021-11-11 PROCEDURE — 93010 ELECTROCARDIOGRAM REPORT: CPT

## 2021-11-11 NOTE — ED PROVIDER NOTE - ATTENDING CONTRIBUTION TO CARE
61 F w/ hx of PV on jakafi, esrd on HD, MWF LUE AVF, HTN anemia, here w/ outpt labs that was hgb of 7.1, pt w/ dizziness and spinning and generalized abd pain. she states her dizziness is improving while being in the ER, she reports she has chornic nausea and gagging w/out vomiting and dry cough, she has no fevers, no changes in urination.  no dysuria, no urinary freq/urgency.   On exam, pt w/ clear lungs soft abdomen w/ LLQ pain; 5/5 upper and lower extremity strength; Plan for labs, imaging of the abdomen, pt states she will get dialysis tomorrow. Will give blood and reassess. suspect initially intraabdominal discomfort, possible diverticulitis. anemia likely 2/2 polycythemia vera

## 2021-11-11 NOTE — ED PROVIDER NOTE - PHYSICAL EXAMINATION
Gen: A&Ox4   HEENT: Atraumatic. Mucous membranes moist, no scleral icterus.  CV: RRR. No significant lower extremity edema.   Resp: Respirations unlabored. CTAB, no rales, no wheezes.  GI: Abdomen mildly ttp diffusely, soft and non-distended.   Skin/MSK: No open wounds. No ecchymosis appreciated.  Neuro: Following commands. No facial drop.   Psych: Appropriate mood, cooperative Gen: A&Ox4   HEENT: Atraumatic. Mucous membranes moist, no scleral icterus.  CV: RRR. No significant lower extremity edema.   Resp: Respirations unlabored. CTAB, no rales, no wheezes.  GI: Abdomen mildly ttp diffusely, soft and non-distended.   Skin/MSK: No open wounds. No ecchymosis appreciated.  Neuro: Following commands. CN2-12 grossly intact. Motor strength +5/5 throughout upper and lower extremities. Sensation intact throughout upper and lower extremities. No tremor. Finger to nose smooth and coordinated, heal to shin smooth and coordinated. No pronator drift.   Psych: Appropriate mood, cooperative

## 2021-11-11 NOTE — ED PROVIDER NOTE - CLINICAL SUMMARY MEDICAL DECISION MAKING FREE TEXT BOX
60 yo female with PMH of Juan José 2+ polycythemia vera (on Jakafi), ESRD on HD MWF via LUE AVF, and HTN, presenting for anemia to 7.1 on outpt labs collected 1 week ago. Pt c/o symptoms of dizziness as if room is spinning and generalized abd pain, worst in LUQ, x 1 day. Also reports chronic nausea and gagging w/o vomiting, and dry cough. Denies fevers, changes in BM's, or changes in urination. Exam as above. Concern for symptomatic anemia, URI, intraabdominal pathology. Consider occult infection. Consider electrolyte abnormality. Labs, cxr, ct abd, ecg, will reassess.

## 2021-11-11 NOTE — ED PROVIDER NOTE - OBJECTIVE STATEMENT
62 yo female with PMH of Juan José 2+ polycythemia vera (on Jakafi), ESRD on HD MWF via LUE AVF, and HTN, presenting for anemia to 7.1 on outpt labs collected 1 week ago. Pt reporting symptoms of dizziness as if room is spinning and generalized abd pain x 1 day. Also reports chronic nausea and gagging w/o vomiting. 62 yo female with PMH of Juan José 2+ polycythemia vera (on Jakafi), ESRD on HD MWF via LUE AVF, and HTN, presenting for anemia to 7.1 on outpt labs collected 1 week ago. Pt c/o symptoms of dizziness as if room is spinning and generalized abd pain, worst in LUQ, x 1 day. Also reports chronic nausea and gagging w/o vomiting, and dry cough. Denies fevers, changes in BM's, or changes in urination.

## 2021-11-11 NOTE — ED ADULT TRIAGE NOTE - IDEAL BODY WEIGHT(KG)
50
Alert-The patient is alert, awake and responds to voice. The patient is oriented to time, place, and person. The triage nurse is able to obtain subjective information.

## 2021-11-11 NOTE — ED ADULT NURSE NOTE - NS ED NURSE PATIENT LEFT UNIT TIME
Already discussed on encounter today.    Patient was called by on call provider on 3/26/2021 regarding results.   03:12

## 2021-11-11 NOTE — ED ADULT NURSE NOTE - OBJECTIVE STATEMENT
Patient  is  alert  and  oriented  x3. Color is  pale and  skin warm to  touch.  She  is  c/o  weakness. She  is sent  here  for  low  H/H. Patient  was  due  for  transfusion  this  week  at  [facility]  but missed  her  appointment.  No  s/s  of  active  bleeding noted.

## 2021-11-11 NOTE — ED ADULT NURSE REASSESSMENT NOTE - NS ED NURSE REASSESS COMMENT FT1
As per Yamila in blood bank the patient has antibodies and the blood will not be ready for another 2-3 hours. Blood bank will call the charge desk when available. MD Henson made aware
Patient resting comfortably in stretcher. Warm pack given for comfort. Pt is aware of plan of care and in agreement. Patient to be given food when CT scan results

## 2021-11-11 NOTE — ED PROVIDER NOTE - NS ED ROS FT
Gen: Denies fever.   HEENT: Denies headache. Denies congestion.  CV: Denies chest pain. Denies lightheadedness.  Skin: Denies rash.   Resp: +SOB. Denies cough.  GI: +abd pain. +nausea. Denies vomiting. Denies diarrhea. Denies melena. Denies hematochezia.  Msk: Denies extremity swelling. Denies extremity pain.  : Denies dysuria. Denies hematuria.  Neuro: Denies LOC. +dizziness. Denies new numbness/tingling. Denies new focal weakness.  Psych: Denies SI

## 2021-11-12 ENCOUNTER — NON-APPOINTMENT (OUTPATIENT)
Age: 61
End: 2021-11-12

## 2021-11-12 DIAGNOSIS — R11.0 NAUSEA: ICD-10-CM

## 2021-11-12 DIAGNOSIS — D64.9 ANEMIA, UNSPECIFIED: ICD-10-CM

## 2021-11-12 DIAGNOSIS — H93.8X2 OTHER SPECIFIED DISORDERS OF LEFT EAR: ICD-10-CM

## 2021-11-12 DIAGNOSIS — D69.6 THROMBOCYTOPENIA, UNSPECIFIED: ICD-10-CM

## 2021-11-12 DIAGNOSIS — N18.6 END STAGE RENAL DISEASE: ICD-10-CM

## 2021-11-12 DIAGNOSIS — I10 ESSENTIAL (PRIMARY) HYPERTENSION: ICD-10-CM

## 2021-11-12 LAB
ALBUMIN SERPL ELPH-MCNC: 4.2 G/DL — SIGNIFICANT CHANGE UP (ref 3.3–5)
ALP SERPL-CCNC: 168 U/L — HIGH (ref 40–120)
ALT FLD-CCNC: 10 U/L — SIGNIFICANT CHANGE UP (ref 10–45)
ANION GAP SERPL CALC-SCNC: 16 MMOL/L — SIGNIFICANT CHANGE UP (ref 5–17)
ANISOCYTOSIS BLD QL: SLIGHT — SIGNIFICANT CHANGE UP
AST SERPL-CCNC: 16 U/L — SIGNIFICANT CHANGE UP (ref 10–40)
BASOPHILS # BLD AUTO: 0.11 K/UL — SIGNIFICANT CHANGE UP (ref 0–0.2)
BASOPHILS NFR BLD AUTO: 1.8 % — SIGNIFICANT CHANGE UP (ref 0–2)
BILIRUB SERPL-MCNC: 2.4 MG/DL — HIGH (ref 0.2–1.2)
BUN SERPL-MCNC: 41 MG/DL — HIGH (ref 7–23)
CALCIUM SERPL-MCNC: 9.3 MG/DL — SIGNIFICANT CHANGE UP (ref 8.4–10.5)
CHLORIDE SERPL-SCNC: 94 MMOL/L — LOW (ref 96–108)
CO2 SERPL-SCNC: 24 MMOL/L — SIGNIFICANT CHANGE UP (ref 22–31)
CREAT SERPL-MCNC: 7.2 MG/DL — HIGH (ref 0.5–1.3)
EOSINOPHIL # BLD AUTO: 0.17 K/UL — SIGNIFICANT CHANGE UP (ref 0–0.5)
EOSINOPHIL NFR BLD AUTO: 2.7 % — SIGNIFICANT CHANGE UP (ref 0–6)
GIANT PLATELETS BLD QL SMEAR: PRESENT — SIGNIFICANT CHANGE UP
GLUCOSE SERPL-MCNC: 110 MG/DL — HIGH (ref 70–99)
HCT VFR BLD CALC: 22.6 % — LOW (ref 34.5–45)
HCT VFR BLD CALC: 22.8 % — LOW (ref 34.5–45)
HGB BLD-MCNC: 7.2 G/DL — LOW (ref 11.5–15.5)
HGB BLD-MCNC: 7.4 G/DL — LOW (ref 11.5–15.5)
HYPOCHROMIA BLD QL: SIGNIFICANT CHANGE UP
LYMPHOCYTES # BLD AUTO: 0.89 K/UL — LOW (ref 1–3.3)
LYMPHOCYTES # BLD AUTO: 14.3 % — SIGNIFICANT CHANGE UP (ref 13–44)
MAGNESIUM SERPL-MCNC: 2.2 MG/DL — SIGNIFICANT CHANGE UP (ref 1.6–2.6)
MANUAL SMEAR VERIFICATION: SIGNIFICANT CHANGE UP
MCHC RBC-ENTMCNC: 30.9 PG — SIGNIFICANT CHANGE UP (ref 27–34)
MCHC RBC-ENTMCNC: 31.5 PG — SIGNIFICANT CHANGE UP (ref 27–34)
MCHC RBC-ENTMCNC: 31.9 GM/DL — LOW (ref 32–36)
MCHC RBC-ENTMCNC: 32.5 GM/DL — SIGNIFICANT CHANGE UP (ref 32–36)
MCV RBC AUTO: 97 FL — SIGNIFICANT CHANGE UP (ref 80–100)
MCV RBC AUTO: 97 FL — SIGNIFICANT CHANGE UP (ref 80–100)
MONOCYTES # BLD AUTO: 0.17 K/UL — SIGNIFICANT CHANGE UP (ref 0–0.9)
MONOCYTES NFR BLD AUTO: 2.7 % — SIGNIFICANT CHANGE UP (ref 2–14)
MYELOCYTES NFR BLD: 0.9 % — HIGH (ref 0–0)
NEUTROPHILS # BLD AUTO: 4.78 K/UL — SIGNIFICANT CHANGE UP (ref 1.8–7.4)
NEUTROPHILS NFR BLD AUTO: 73.2 % — SIGNIFICANT CHANGE UP (ref 43–77)
NEUTS BAND # BLD: 3.5 % — SIGNIFICANT CHANGE UP (ref 0–8)
NRBC # BLD: 2 /100 WBCS — HIGH (ref 0–0)
NRBC # BLD: 2 /100 — HIGH (ref 0–0)
PHOSPHATE SERPL-MCNC: 3.7 MG/DL — SIGNIFICANT CHANGE UP (ref 2.5–4.5)
PLAT MORPH BLD: NORMAL — SIGNIFICANT CHANGE UP
PLATELET # BLD AUTO: 92 K/UL — LOW (ref 150–400)
PLATELET # BLD AUTO: 94 K/UL — LOW (ref 150–400)
POIKILOCYTOSIS BLD QL AUTO: SLIGHT — SIGNIFICANT CHANGE UP
POLYCHROMASIA BLD QL SMEAR: SLIGHT — SIGNIFICANT CHANGE UP
POTASSIUM SERPL-MCNC: 5.2 MMOL/L — SIGNIFICANT CHANGE UP (ref 3.5–5.3)
POTASSIUM SERPL-SCNC: 5.2 MMOL/L — SIGNIFICANT CHANGE UP (ref 3.5–5.3)
PROT SERPL-MCNC: 7.9 G/DL — SIGNIFICANT CHANGE UP (ref 6–8.3)
RBC # BLD: 2.33 M/UL — LOW (ref 3.8–5.2)
RBC # BLD: 2.35 M/UL — LOW (ref 3.8–5.2)
RBC # FLD: 19.8 % — HIGH (ref 10.3–14.5)
RBC # FLD: 20 % — HIGH (ref 10.3–14.5)
RBC BLD AUTO: ABNORMAL
SODIUM SERPL-SCNC: 134 MMOL/L — LOW (ref 135–145)
VARIANT LYMPHS # BLD: 0.9 % — SIGNIFICANT CHANGE UP (ref 0–6)
WBC # BLD: 5.25 K/UL — SIGNIFICANT CHANGE UP (ref 3.8–10.5)
WBC # BLD: 6.23 K/UL — SIGNIFICANT CHANGE UP (ref 3.8–10.5)
WBC # FLD AUTO: 5.25 K/UL — SIGNIFICANT CHANGE UP (ref 3.8–10.5)
WBC # FLD AUTO: 6.23 K/UL — SIGNIFICANT CHANGE UP (ref 3.8–10.5)

## 2021-11-12 PROCEDURE — 99223 1ST HOSP IP/OBS HIGH 75: CPT

## 2021-11-12 PROCEDURE — 99223 1ST HOSP IP/OBS HIGH 75: CPT | Mod: GC

## 2021-11-12 RX ORDER — INFLUENZA VIRUS VACCINE 15; 15; 15; 15 UG/.5ML; UG/.5ML; UG/.5ML; UG/.5ML
0.5 SUSPENSION INTRAMUSCULAR ONCE
Refills: 0 | Status: DISCONTINUED | OUTPATIENT
Start: 2021-11-12 | End: 2021-11-13

## 2021-11-12 RX ORDER — ACETAMINOPHEN 500 MG
650 TABLET ORAL EVERY 6 HOURS
Refills: 0 | Status: DISCONTINUED | OUTPATIENT
Start: 2021-11-12 | End: 2021-11-13

## 2021-11-12 RX ORDER — ERYTHROPOIETIN 10000 [IU]/ML
20000 INJECTION, SOLUTION INTRAVENOUS; SUBCUTANEOUS
Refills: 0 | Status: DISCONTINUED | OUTPATIENT
Start: 2021-11-12 | End: 2021-11-13

## 2021-11-12 RX ORDER — ONDANSETRON 8 MG/1
4 TABLET, FILM COATED ORAL EVERY 8 HOURS
Refills: 0 | Status: DISCONTINUED | OUTPATIENT
Start: 2021-11-12 | End: 2021-11-13

## 2021-11-12 RX ORDER — LANOLIN ALCOHOL/MO/W.PET/CERES
3 CREAM (GRAM) TOPICAL AT BEDTIME
Refills: 0 | Status: DISCONTINUED | OUTPATIENT
Start: 2021-11-12 | End: 2021-11-13

## 2021-11-12 RX ORDER — DOXERCALCIFEROL 2.5 UG/1
4 CAPSULE ORAL
Refills: 0 | Status: DISCONTINUED | OUTPATIENT
Start: 2021-11-12 | End: 2021-11-13

## 2021-11-12 RX ORDER — GABAPENTIN 400 MG/1
100 CAPSULE ORAL AT BEDTIME
Refills: 0 | Status: DISCONTINUED | OUTPATIENT
Start: 2021-11-12 | End: 2021-11-13

## 2021-11-12 RX ORDER — CALCIUM ACETATE 667 MG
1334 TABLET ORAL
Refills: 0 | Status: DISCONTINUED | OUTPATIENT
Start: 2021-11-12 | End: 2021-11-13

## 2021-11-12 RX ADMIN — Medication 1334 MILLIGRAM(S): at 10:04

## 2021-11-12 RX ADMIN — Medication 1334 MILLIGRAM(S): at 14:23

## 2021-11-12 RX ADMIN — Medication 1 TABLET(S): at 14:23

## 2021-11-12 RX ADMIN — ERYTHROPOIETIN 20000 UNIT(S): 10000 INJECTION, SOLUTION INTRAVENOUS; SUBCUTANEOUS at 18:36

## 2021-11-12 NOTE — H&P ADULT - PROBLEM SELECTOR PLAN 2
-Likely secondary to PV  -Currently stable Plt count  -No symptoms of active bleeding.  -Continue to monitor

## 2021-11-12 NOTE — H&P ADULT - PROBLEM SELECTOR PLAN 3
-Likely multifactorial from metabolic cause and chemotherapy  -Will trial Zofran 4 mg PO q8H PRN nausea

## 2021-11-12 NOTE — CONSULT NOTE ADULT - SUBJECTIVE AND OBJECTIVE BOX
[facility] DIVISION OF KIDNEY DISEASES AND HYPERTENSION -- FOLLOW UP NOTE  --------------------------------------------------------------------------------  Chief Complaint:    24 hour events/subjective:    Patient is a 61 y.o. 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 Pt. follows with Dr. De León as outpatient at [facility]. Patient seen by Dr. REX Guo in the office with routine blood draw with Hgb 6.8, now 7.2 after 1 unit pRBC. Patient notes general fatigue for the past few days with dyspnea. Fatigue worsens with activity.  NO abdominal pain, no red blood per rectum or melena.  NO epistaxis.  NO vaginal bleeding.  Ho hematuria.  NO jaundice. Patient with multiple antibodies from prior transfusions. Pt. had port placed on 11/2 so that Pt. can receive blood transfusions at [facility] once port is healed.     Nephrology consulted for ESRD management. Pt. follows with Dr. De León at [facility]. Pt. to receive pRBC with dialysis today. Pt. currently denies and chest pain, shortness of breath, lightheadedness or dizziness.    PAST HISTORY  --------------------------------------------------------------------------------  No significant changes to PMH, PSH, FHx, SHx, unless otherwise noted    ALLERGIES & MEDICATIONS  --------------------------------------------------------------------------------  Allergies    No Known Allergies    Intolerances      Standing Inpatient Medications  calcium acetate 1334 milliGRAM(s) Oral three times a day with meals  doxercalciferol Injectable 4 MICROGram(s) IV Push <User Schedule>  epoetin filiberto-epbx (RETACRIT) Injectable 31114 Unit(s) IV Push <User Schedule>  Nephro-deonna 1 Tablet(s) Oral daily  propranolol 10 milliGRAM(s) Oral daily    PRN Inpatient Medications  acetaminophen     Tablet .. 650 milliGRAM(s) Oral every 6 hours PRN  melatonin 3 milliGRAM(s) Oral at bedtime PRN  ondansetron    Tablet 4 milliGRAM(s) Oral every 8 hours PRN      REVIEW OF SYSTEMS  --------------------------------------------------------------------------------  Gen: No fevers/chills  Skin: No rashes  Head/Eyes/Ears: Normal hearing,   Respiratory: some dyspnea, especially with exertion  CV: No chest pain  GI: No abdominal pain, diarrhea  : No dysuria, hematuria  MSK: No  edema  Heme: No easy bruising or bleeding  Psych: No significant depression      All other systems were reviewed and are negative, except as noted.    VITALS/PHYSICAL EXAM  --------------------------------------------------------------------------------  T(C): 36.9 (11-12-21 @ 13:27), Max: 37.3 (11-11-21 @ 23:00)  HR: 82 (11-12-21 @ 13:27) (82 - 107)  BP: 120/76 (11-12-21 @ 13:27) (106/63 - 164/62)  RR: 18 (11-12-21 @ 13:27) (15 - 22)  SpO2: 98% (11-12-21 @ 13:27) (97% - 100%)  Wt(kg): --  Height (cm): 157.5 (11-11-21 @ 16:23)  Weight (kg): 53 (11-11-21 @ 16:23)  BMI (kg/m2): 21.4 (11-11-21 @ 16:23)  BSA (m2): 1.52 (11-11-21 @ 16:23)        Physical Exam:  	Gen: NAD  	HEENT: MMM  	Pulm: CTA B/L  	CV: S1S2  	Abd: Soft, +BS   	Ext: No LE edema B/L  	Neuro: Awake  	Skin: Warm and dry  	Vascular access: LUE AVF- thrills present       LABS/STUDIES  --------------------------------------------------------------------------------              7.4    6.23  >-----------<  94       [11-12-21 @ 13:11]              22.8     134  |  94  |  41  ----------------------------<  110      [11-12-21 @ 13:12]  5.2   |  24  |  7.20        Ca     9.3     [11-12-21 @ 13:12]      Mg     2.2     [11-12-21 @ 13:12]      Phos  3.7     [11-12-21 @ 13:12]    TPro  7.9  /  Alb  4.2  /  TBili  2.4  /  DBili  x   /  AST  16  /  ALT  10  /  AlkPhos  168  [11-12-21 @ 13:12]    PT/INR: PT 14.0 , INR 1.18       [11-11-21 @ 18:20]  PTT: 31.2       [11-11-21 @ 18:20]      Creatinine Trend:  SCr 7.20 [11-12 @ 13:12]  SCr 5.87 [11-11 @ 18:20]  SCr 4.76 [10-22 @ 09:24]  SCr 6.83 [10-21 @ 07:01]  SCr 5.51 [10-20 @ 08:42]    Iron 36, TIBC 152, %sat 23      [08-11-21 @ 09:35]  Ferritin 998      [08-11-21 @ 09:40]  HbA1c 4.9      [06-01-19 @ 00:46]  TSH 1.78      [09-08-21 @ 09:18]

## 2021-11-12 NOTE — H&P ADULT - PROBLEM SELECTOR PLAN 4
-Nephrology consult for conitnuation of HD on her routine schedule MWF  -S/P 1U PRBCs, currently not examining volume overloaded  -Continue home calcium acetate and nephro-deonna  -Please coordinate next PRBC transfusion with HD session today. PRBCs ordered, still awaiting delivery from [place] (pt's blood with antibodies).

## 2021-11-12 NOTE — H&P ADULT - NSHPPHYSICALEXAM_GEN_ALL_CORE
ICU Vital Signs Last 24 Hrs  T(C): 37.1 (12 Nov 2021 05:27), Max: 37.3 (11 Nov 2021 23:00)  T(F): 98.8 (12 Nov 2021 05:27), Max: 99.2 (11 Nov 2021 23:00)  HR: 93 (12 Nov 2021 05:27) (84 - 107)  BP: 108/69 (12 Nov 2021 05:27) (106/63 - 124/76)  BP(mean): --  ABP: --  ABP(mean): --  RR: 16 (12 Nov 2021 05:27) (15 - 22)  SpO2: 97% (12 Nov 2021 05:27) (97% - 100%)

## 2021-11-12 NOTE — H&P ADULT - PROBLEM SELECTOR PLAN 5
-Possibly pulsatile tinnitus from anemia and high flow/cardiac output from AVF placement  -Bothersome to patient as it is keeping her up at night  -Consider ENT evaluation for further workup, perhaps as an outpatient

## 2021-11-12 NOTE — H&P ADULT - ASSESSMENT
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 Anemia likely multifactorial secondary to PV and anemia of renal disease.

## 2021-11-12 NOTE — H&P ADULT - HISTORY OF PRESENT ILLNESS
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     She endorsed dyspnea on exertion with 30 feet or 1 city block. Denies chest pain and palpitations.    She has also been experiencing nausea 8/10 in severity, alleviated slightly with drinking hot water, and worsened with decreasing hemoglobin counts. She denies anxiety or anticipatory nausea. She denies vomiting but does have gagging. Also has occasional abdominal pain which she describes as periumbilical, intermittent and lasting a few seconds, frequent, radiating to the LUQ, and without exacerbating or alleviating factors. She attributes the pain ot her enlarged spleen. She admitted to having increased bowel movement frequency with soft BMs 3-4 times per day (normally 1 BM/day), admitted to taking Senna at home.    Lastly she endorsed hearing her heart beat loudly in her left ear. She hears it louder in a quiet environment and it keeps her up at night. She denies hearing this sound in her right ear, tinnitus, vertigo, hearing loss, and ear pain. States it has been present for years and started at least a year after she had her AVF placed.

## 2021-11-12 NOTE — CONSULT NOTE ADULT - ASSESSMENT
Patient is a 61 y.o. 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 Pt. follows with Dr. De León as outpatient at [facility].     #ESRD  #Access  #Hyperkalemia   #HTN  Last HD 11/10.  Will arrange for HD today via LUE AVF  Potassium will be corrected with 2k bath.   c/w Propanolol     #Anemia   Hgb below goal 2/2 ESRD and Myelofibrosis  for 2nd unit pRBC with HD  c/w Epo  c/w Jakafi    #BMD  Check Phos and monitor   c/w Hecterol and calcium acetate 1,334 TID    If you have any questions, please feel free to contact me  Kota Ramey  Nephrology Fellow  [phone]  (After 5pm or on weekends please page the on-call fellow)

## 2021-11-12 NOTE — H&P ADULT - PROBLEM SELECTOR PLAN 1
-Hgb 6.8-->7.2 after 1U PRBC  -Pt with antibodies in blood. Spoke with pharmacy, pending PRBCs delivery from [place] hopefully later today (blood bank unsure what time it will arrive). Will request 2nd Unit of PRBCs be given with Hemodialysis session today to prevent volume overload.  -Follow-up repeat CBC ordered for noon  -Consider hematology consult for continuity

## 2021-11-12 NOTE — H&P ADULT - NSHPLABSRESULTS_GEN_ALL_CORE
Personally reviewed old records.  Personally reviewed labs.  Personally reviewed imaging- CXR with clear lungs and right chest port in SVC  Personally reviewed EKG with normal sinus rhythm; HR 88 bpm; QTc 438 ms; T wave inversions in V1-V2 consistent with previous EKG from 10/20/21.                          7.2    5.25  )-----------( 92       ( 12 Nov 2021 05:55 )             22.6       11-11    132<L>  |  93<L>  |  31<H>  ----------------------------<  93  4.6   |  22  |  5.87<H>    Ca    9.1      11 Nov 2021 18:20  Mg     2.1     11-11    TPro  8.1  /  Alb  4.2  /  TBili  1.9<H>  /  DBili  x   /  AST  16  /  ALT  9<L>  /  AlkPhos  158<H>  11-11            LIVER FUNCTIONS - ( 11 Nov 2021 18:20 )  Alb: 4.2 g/dL / Pro: 8.1 g/dL / ALK PHOS: 158 U/L / ALT: 9 U/L / AST: 16 U/L / GGT: x             PT/INR - ( 11 Nov 2021 18:20 )   PT: 14.0 sec;   INR: 1.18 ratio         PTT - ( 11 Nov 2021 18:20 )  PTT:31.2 sec

## 2021-11-12 NOTE — PATIENT PROFILE ADULT - NSPROPTRIGHTCAREGIVER_GEN_A_NUR
ORTHOPAEDIC NOTE    HPI: Sowmya Harding is a 40 year old female presenting for  right shoulder problems.  She's having difficulty with use of the right shoulder.  She has difficulty with abduction and flexion of the right shoulder.  This began as a work-related injury.  Date of the injury was 2017.  She now complains of difficulty with pushing and pulling with the right shoulder.  She has tried conservative management of therapy, anti-inflammatory medication and restrictions.  She recently had an MRI of her shoulder.  She does not see significant improvements that allows for her to return to work full activity at this point.  Pain is deep and achy over the anterior and posterior aspect of the right shoulder and some of this goes down into the deltoid.  She has a sharp, episodic pain can go up to 8 out of 10 in severity.  No neck pain or back pain.    Past Medical History:   Diagnosis Date   • Complicated pregnancy     hospitalized ICU   • Hypotension 2017    per pt via interpretor     Past Surgical History:   Procedure Laterality Date   •  DELIVERY ONLY      emergent     Social History   Substance Use Topics   • Smoking status: Never Smoker   • Smokeless tobacco: Never Used   • Alcohol use Not on file     Current Outpatient Prescriptions   Medication Sig   • diclofenac (VOLTAREN) 75 MG EC tablet Take 1 tablet by mouth 2 times daily.   • ibuprofen (MOTRIN) 200 MG tablet Take 200 mg by mouth every 6 hours as needed for Pain.     No current facility-administered medications for this visit.      ALLERGIES:  No Known Allergies    REVIEW OF SYSTEMS:  Constitutional:  Denies fever or chills  Eyes:  Denies change in visual acuity  HEENT: Denies nasal congestion or sore throat  Respiratory:  Denies cough or shortness of breath  Cardiovascular:  Denies chest pain or edema  GI:  Denies abdominal pain, nausea, vomiting, bloody stools or diarrhea  :  Denies dysuria  Musculoskeletal:  Denies back  pain or joint pain  Integument:  Denies rash  Neurologic:  Denies headache, focal weakness or sensory changes  Endocrine:  Denies polyuria or polydipsia  Lymphatic:  Denies swollen glands  Psychiatric:  Denies depression or anxiety    PHYSICAL EXAM:  There were no vitals taken for this visit.  General:  Well groomed, in no apparent distress.  HEENT:  Eyes normal, PERRLA, sinuses non tender, nose normal, pharynx clear.  Neck:  Supple, no lymphadenopathy, no thyromegaly.  Extremities:  No cyanosis, clubbing, edema.  Skin:  No abnormal skin lesions.  Neurologic: Alert and oriented x 4. Alert and cooperative. Cranial nerves II-XII intact.  Reflexes 2+ and symmetrical throughout. Normal strength and sensation.  Musculoskeletal: Positive impingement signs of the right shoulder.  Negative empty can test.  She is able flex and abduct her shoulders to 90°, external rotation is to 50° versus 60° on the contralateral side internal rotation mid thoracic region.    X-RAY INTERPRETATION: MRI of the right shoulder:    FINDINGS:       Rotator Cuff: Tendinosis involving the supraspinatus and infraspinatus.  Focal high-grade partial tear of the subscapularis inferior fibers is  suspected on series 6 image 12. No rotator cuff muscle atrophy or abnormal  muscle signal.     AC joint: Minimal acromioclavicular undersurface hypertrophy. No  significant fluid at the acromioclavicular joint. Coracoclavicular ligament  appears intact.     Biceps tendon: The biceps tendon is normally situated within the bicipital  groove. Intact.     Labrum: Evaluation is limited by lack of intraarticular gadolinium.  Heterogeneous signal within the superior and superior-posterior labrum may  be labral degeneration. No large displaced labral tear is noted. MR  arthrogram can be considered for further evaluation.     Osseous structures: No Hill-Sachs or osseous Bankart deformity.     Fluid: Minimal glenohumeral joint effusion. There is a  small  subacromion-subdeltoid bursal effusion.        IMPRESSION:   1. Rotator cuff disease, as described.  2. Probable labral degeneration. MR arthrogram can be considered for better  characterization, if necessary.  3. Additional findings, as described.      IMPRESSION/DIAGNOSIS: Right shoulder pain with rotator cuff disease and possible labral pathology      TREATMENT AND RECOMMENDATIONS: She has tried conservative management without significant improvements.  We discussed right shoulder arthroscopy with possible labral and/or rotator cuff repair.  The risks and the benefits of the procedure were explained length and questions were answered to her satisfaction.  We will set her up for this in the near future.  In the meantime we will continue with a 10 pound lifting restriction that's been placed on her.         declines

## 2021-11-12 NOTE — H&P ADULT - NSHPADDITIONALINFOADULT_GEN_ALL_CORE
Holding home Senna in setting of more frequent BMs    DVT Ppx: SCDs  Diet: Renal, Cardiac    I have seen and examined the patient.  I have personally reviewed all labs, imaging, and EKG.    Elias Yeung DO  Hospitalist, Department of Medicine  Pager: [phone] (available 8PM-8AM)

## 2021-11-13 ENCOUNTER — TRANSCRIPTION ENCOUNTER (OUTPATIENT)
Age: 61
End: 2021-11-13

## 2021-11-13 VITALS
OXYGEN SATURATION: 95 % | DIASTOLIC BLOOD PRESSURE: 67 MMHG | TEMPERATURE: 98 F | HEART RATE: 81 BPM | SYSTOLIC BLOOD PRESSURE: 107 MMHG | RESPIRATION RATE: 18 BRPM

## 2021-11-13 LAB
ANION GAP SERPL CALC-SCNC: 15 MMOL/L — SIGNIFICANT CHANGE UP (ref 5–17)
BUN SERPL-MCNC: 16 MG/DL — SIGNIFICANT CHANGE UP (ref 7–23)
CALCIUM SERPL-MCNC: 8.9 MG/DL — SIGNIFICANT CHANGE UP (ref 8.4–10.5)
CHLORIDE SERPL-SCNC: 92 MMOL/L — LOW (ref 96–108)
CO2 SERPL-SCNC: 26 MMOL/L — SIGNIFICANT CHANGE UP (ref 22–31)
COVID-19 NUCLEOCAPSID GAM AB INTERP: POSITIVE
COVID-19 NUCLEOCAPSID TOTAL GAM ANTIBODY RESULT: 30.8 INDEX — HIGH
COVID-19 SPIKE DOMAIN AB INTERP: POSITIVE
COVID-19 SPIKE DOMAIN ANTIBODY RESULT: >250 U/ML — HIGH
CREAT SERPL-MCNC: 4.28 MG/DL — HIGH (ref 0.5–1.3)
GLUCOSE SERPL-MCNC: 92 MG/DL — SIGNIFICANT CHANGE UP (ref 70–99)
HCT VFR BLD CALC: 27 % — LOW (ref 34.5–45)
HGB BLD-MCNC: 8.6 G/DL — LOW (ref 11.5–15.5)
MAGNESIUM SERPL-MCNC: 2 MG/DL — SIGNIFICANT CHANGE UP (ref 1.6–2.6)
MCHC RBC-ENTMCNC: 30.8 PG — SIGNIFICANT CHANGE UP (ref 27–34)
MCHC RBC-ENTMCNC: 31.9 GM/DL — LOW (ref 32–36)
MCV RBC AUTO: 96.8 FL — SIGNIFICANT CHANGE UP (ref 80–100)
NRBC # BLD: 2 /100 WBCS — HIGH (ref 0–0)
PHOSPHATE SERPL-MCNC: 2.9 MG/DL — SIGNIFICANT CHANGE UP (ref 2.5–4.5)
PLATELET # BLD AUTO: 100 K/UL — LOW (ref 150–400)
POTASSIUM SERPL-MCNC: 3.9 MMOL/L — SIGNIFICANT CHANGE UP (ref 3.5–5.3)
POTASSIUM SERPL-SCNC: 3.9 MMOL/L — SIGNIFICANT CHANGE UP (ref 3.5–5.3)
RBC # BLD: 2.79 M/UL — LOW (ref 3.8–5.2)
RBC # FLD: 18.2 % — HIGH (ref 10.3–14.5)
SARS-COV-2 IGG+IGM SERPL QL IA: 30.8 INDEX — HIGH
SARS-COV-2 IGG+IGM SERPL QL IA: >250 U/ML — HIGH
SARS-COV-2 IGG+IGM SERPL QL IA: POSITIVE
SARS-COV-2 IGG+IGM SERPL QL IA: POSITIVE
SODIUM SERPL-SCNC: 133 MMOL/L — LOW (ref 135–145)
WBC # BLD: 5.46 K/UL — SIGNIFICANT CHANGE UP (ref 3.8–10.5)
WBC # FLD AUTO: 5.46 K/UL — SIGNIFICANT CHANGE UP (ref 3.8–10.5)

## 2021-11-13 PROCEDURE — 86870 RBC ANTIBODY IDENTIFICATION: CPT

## 2021-11-13 PROCEDURE — 82803 BLOOD GASES ANY COMBINATION: CPT

## 2021-11-13 PROCEDURE — 93005 ELECTROCARDIOGRAM TRACING: CPT

## 2021-11-13 PROCEDURE — 82947 ASSAY GLUCOSE BLOOD QUANT: CPT

## 2021-11-13 PROCEDURE — 85027 COMPLETE CBC AUTOMATED: CPT

## 2021-11-13 PROCEDURE — 84295 ASSAY OF SERUM SODIUM: CPT

## 2021-11-13 PROCEDURE — 86769 SARS-COV-2 COVID-19 ANTIBODY: CPT

## 2021-11-13 PROCEDURE — 86880 COOMBS TEST DIRECT: CPT

## 2021-11-13 PROCEDURE — 83735 ASSAY OF MAGNESIUM: CPT

## 2021-11-13 PROCEDURE — 86902 BLOOD TYPE ANTIGEN DONOR EA: CPT

## 2021-11-13 PROCEDURE — 71045 X-RAY EXAM CHEST 1 VIEW: CPT

## 2021-11-13 PROCEDURE — 85610 PROTHROMBIN TIME: CPT

## 2021-11-13 PROCEDURE — 86860 RBC ANTIBODY ELUTION: CPT

## 2021-11-13 PROCEDURE — 80048 BASIC METABOLIC PNL TOTAL CA: CPT

## 2021-11-13 PROCEDURE — P9040: CPT

## 2021-11-13 PROCEDURE — 86901 BLOOD TYPING SEROLOGIC RH(D): CPT

## 2021-11-13 PROCEDURE — 83605 ASSAY OF LACTIC ACID: CPT

## 2021-11-13 PROCEDURE — 84100 ASSAY OF PHOSPHORUS: CPT

## 2021-11-13 PROCEDURE — 74177 CT ABD & PELVIS W/CONTRAST: CPT | Mod: MA

## 2021-11-13 PROCEDURE — 36415 COLL VENOUS BLD VENIPUNCTURE: CPT

## 2021-11-13 PROCEDURE — 85730 THROMBOPLASTIN TIME PARTIAL: CPT

## 2021-11-13 PROCEDURE — 85014 HEMATOCRIT: CPT

## 2021-11-13 PROCEDURE — U0003: CPT

## 2021-11-13 PROCEDURE — 80053 COMPREHEN METABOLIC PANEL: CPT

## 2021-11-13 PROCEDURE — U0005: CPT

## 2021-11-13 PROCEDURE — 86922 COMPATIBILITY TEST ANTIGLOB: CPT

## 2021-11-13 PROCEDURE — 82565 ASSAY OF CREATININE: CPT

## 2021-11-13 PROCEDURE — 86850 RBC ANTIBODY SCREEN: CPT

## 2021-11-13 PROCEDURE — 85025 COMPLETE CBC W/AUTO DIFF WBC: CPT

## 2021-11-13 PROCEDURE — 36430 TRANSFUSION BLD/BLD COMPNT: CPT

## 2021-11-13 PROCEDURE — 82435 ASSAY OF BLOOD CHLORIDE: CPT

## 2021-11-13 PROCEDURE — 85018 HEMOGLOBIN: CPT

## 2021-11-13 PROCEDURE — 99285 EMERGENCY DEPT VISIT HI MDM: CPT

## 2021-11-13 PROCEDURE — 82330 ASSAY OF CALCIUM: CPT

## 2021-11-13 PROCEDURE — 84132 ASSAY OF SERUM POTASSIUM: CPT

## 2021-11-13 PROCEDURE — 86900 BLOOD TYPING SEROLOGIC ABO: CPT

## 2021-11-13 RX ORDER — ONDANSETRON 8 MG/1
1 TABLET, FILM COATED ORAL
Qty: 21 | Refills: 0
Start: 2021-11-13 | End: 2021-11-19

## 2021-11-13 RX ADMIN — Medication 1334 MILLIGRAM(S): at 17:15

## 2021-11-13 RX ADMIN — Medication 1334 MILLIGRAM(S): at 08:58

## 2021-11-13 RX ADMIN — Medication 1 TABLET(S): at 12:41

## 2021-11-13 RX ADMIN — Medication 1334 MILLIGRAM(S): at 12:41

## 2021-11-13 NOTE — DISCHARGE NOTE PROVIDER - DISCHARGE DIET
Regular Diet - No restrictions/Low Sodium Diet/Consistent Carbohydrate Diabetic Diets/Renal Diets (for dialysis)

## 2021-11-13 NOTE — DISCHARGE NOTE PROVIDER - NSDCFUADDAPPT_GEN_ALL_CORE_FT
Follow up nephrologist at Hemodialysis site as scheduled.   Follow up with PMD with 48 hors of discharge.

## 2021-11-13 NOTE — DISCHARGE NOTE PROVIDER - NSDCMRMEDTOKEN_GEN_ALL_CORE_FT
acetaminophen 325 mg oral tablet: 2 tab(s) orally every 6 hours, As needed, TMild Pain (1 - 3)  calcium acetate 667 mg oral capsule: 2 tab(s) orally 3 times a day  epoetin filiberto: 74163 unit(s) intravenous Monday, Wednesday, and Friday  Jakafi 10 mg oral tablet: 1 tab(s) orally 3 times a week after hemodialysis Monday, Wednesday, Friday  melatonin 3 mg oral tablet: 1 tab(s) orally once a day (at bedtime), As needed, Insomnia  Nephro-Ben oral tablet: 1 tab(s) orally once a day  ondansetron 4 mg oral tablet: 1 tab(s) orally every 8 hours, As needed, Nausea and/or Vomiting  propranolol 10 mg oral tablet: 1 tab(s) orally once a day  senna oral tablet: 2 tab(s) orally once a day (at bedtime)

## 2021-11-13 NOTE — DISCHARGE NOTE PROVIDER - HOSPITAL COURSE
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 Anemia likely multifactorial secondary to PV and anemia of renal disease.      > Anemia.   ·  Plan: -Hgb 6.8-->7.2 after 2U PRBC  - Repeat CBC stable; HG 8.6      > Thrombocytopenia.   ·  Plan: -Likely secondary to PV  -Currently stable Plt count  -No symptoms of active bleeding.    > Nausea.   ·  Plan: -Likely multifactorial from metabolic cause and chemotherapy  - Zofran 4 mg PO q8H PRN nausea.    > ESRD on hemodialysis.   ·  Plan: -Nephrology consulted  - S/P   HD on her routine schedule MWF  -Continue home calcium acetate and nephro-deonna    >  Audible heartbeat in left ear.   ·  Plan: -Possibly pulsatile tinnitus from anemia and high flow/cardiac output from AVF placement  -Bothersome to patient as it is keeping her up at night  -Consider ENT evaluation for further workup, perhaps as an outpatient.    >  Hypertension.   ·  Plan: -Continue home Propranolol.    Dr Sweeney cleared for discharge home with outpatient follow.

## 2021-11-13 NOTE — DISCHARGE NOTE NURSING/CASE MANAGEMENT/SOCIAL WORK - PATIENT PORTAL LINK FT
You can access the FollowMyHealth Patient Portal offered by [facility] by registering at the following website: [email or web address]. By joining [employer]’s FollowMyHealth portal, you will also be able to view your health information using other applications (apps) compatible with our system.

## 2021-11-13 NOTE — DISCHARGE NOTE PROVIDER - NSDCCPCAREPLAN_GEN_ALL_CORE_FT
PRINCIPAL DISCHARGE DIAGNOSIS  Diagnosis: Anemia  Assessment and Plan of Treatment: S/p 2 units PRBC. Follow up with PMD outpatient.      SECONDARY DISCHARGE DIAGNOSES  Diagnosis: Nausea  Assessment and Plan of Treatment: Resolved    Diagnosis: Hypertension  Assessment and Plan of Treatment:     Diagnosis: ESRD on hemodialysis  Assessment and Plan of Treatment:

## 2021-11-13 NOTE — DISCHARGE NOTE PROVIDER - NSDCFUSCHEDAPPT_GEN_ALL_CORE_FT
HU, MIHYEON ; 11/18/2021 ; [facility]  [facility], MIHYEON ; 11/19/2021 ; [facility]  [facility], MIHYEON ; 11/22/2021 ; [facility]  [facility], MIHYEON ; 12/16/2021 ; [facility] [address] [facility], MIHYEON ; 12/16/2021 ; [facility] [address]

## 2021-11-13 NOTE — DISCHARGE NOTE NURSING/CASE MANAGEMENT/SOCIAL WORK - NSDCVIVACCINE_GEN_ALL_CORE_FT
influenza, injectable, quadrivalent, preservative free; 20-Nov-2014 12:44; Aston Ulloa (RN); [employer]; [id number]; IntraMuscular; Deltoid Left.; 0.5 milliLiter(s);   influenza, injectable, quadrivalent, preservative free; 05-Oct-2016 18:15; Rosario James (RN); [employer]; [id number]; IntraMuscular; Deltoid Left.; 0.5 milliLiter(s); VIS (VIS Published: 07-Aug-2015, VIS Presented: 05-Oct-2016);

## 2021-11-15 DIAGNOSIS — D75.1 SECONDARY POLYCYTHEMIA: ICD-10-CM

## 2021-11-18 ENCOUNTER — OUTPATIENT (OUTPATIENT)
Dept: OUTPATIENT SERVICES | Facility: HOSPITAL | Age: 61
LOS: 1 days | End: 2021-11-18
Payer: MEDICAID

## 2021-11-18 DIAGNOSIS — T85.898A OTHER SPECIFIED COMPLICATION OF OTHER INTERNAL PROSTHETIC DEVICES, IMPLANTS AND GRAFTS, INITIAL ENCOUNTER: Chronic | ICD-10-CM

## 2021-11-18 DIAGNOSIS — D75.81 MYELOFIBROSIS: ICD-10-CM

## 2021-11-18 DIAGNOSIS — Z99.2 DEPENDENCE ON RENAL DIALYSIS: Chronic | ICD-10-CM

## 2021-11-18 DIAGNOSIS — Z23 ENCOUNTER FOR IMMUNIZATION: ICD-10-CM

## 2021-11-18 DIAGNOSIS — I77.0 ARTERIOVENOUS FISTULA, ACQUIRED: Chronic | ICD-10-CM

## 2021-11-19 ENCOUNTER — APPOINTMENT (OUTPATIENT)
Dept: HEMATOLOGY ONCOLOGY | Facility: CLINIC | Age: 61
End: 2021-11-19

## 2021-11-19 ENCOUNTER — NON-APPOINTMENT (OUTPATIENT)
Age: 61
End: 2021-11-19

## 2021-11-22 ENCOUNTER — NON-APPOINTMENT (OUTPATIENT)
Age: 61
End: 2021-11-22

## 2021-11-23 ENCOUNTER — APPOINTMENT (OUTPATIENT)
Dept: HEMATOLOGY ONCOLOGY | Facility: CLINIC | Age: 61
End: 2021-11-23
Payer: COMMERCIAL

## 2021-11-23 PROCEDURE — 99443: CPT

## 2021-11-25 NOTE — ASSESSMENT
[FreeTextEntry1] : This is a 58 woman with a history of Polycythemia Vera, MAK 2 + since 2012. 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  She is now under my super vision for the P. Vera.  Patient had a normal hemoglobin on last follow up in 2019. Hg 12.5g/dl, was unable to explain why it was suddenly normal at the time.  More recently admitted for abdominal pain  secondary to splenomegaly with splenic infarcts.  This had responded very well to the use of hydroxyurea. Hg came up with treatment up to 9.9 at one point, pain from splenomegaly decreased significantly though patient was feeling left upper quadrant pain.  After a month of Jakafi 15mg following dialysis 3 times a week, patient abdominal pain and left upper quadrant pain has resolved, however she became weak and anemic requiring transfusion at the hospital.  Patient now discharged. still feeling weak.  Explained to patient that this could have been progression of the myelofibrosis but could also be from  the Jakafi 15mg which causes anemia as a side effect.  After July admission, Patient was transitioned to Jakafi 10mg, patient was recently re-admitted with fevers, unclear source.    She was discharged in the past 3 weeks and is stil suffering form intermittent subjective  fevers. Abdominal pain particularly in the left upper quadrant had returned somewhat.  \par Unclear where the left sided rib pain is coming from, however the pain in the splenic region seems to be doing well with the Jakafi, would continue 10mg 3 times a week with dialysis.\par \par Will have patient return every 2 weeks on Tuesday for CBC type and screen for possible transfusion, and Thursday for the actual transfusion via mediport.  Will need this moved off of the current [place] Tuesday schedule given she goes to dialysis on Mondays.  \par \par We have patient on danazole for the improvement in Hg, transfusion sparring treatment.  If this is effective this may make the transfusions unnecessary, however we are still awaiting a response.  This can sometimes take months to start working.  Patient seems to be tolerating this well, so will increase it to 200mg 3 times a day.  She does note some mild increase in refulx due to this medication, can start famotidine for patient.

## 2021-11-25 NOTE — HISTORY OF PRESENT ILLNESS
[Home] : at home, [unfilled] , at the time of the visit. [Medical Office: ([facility])___] : at the medical office located in  [Spouse] : spouse [Verbal consent obtained from patient] : the patient, [unfilled] [FreeTextEntry3] :  number [id number] Vietnamese language line intepreter [de-identified] : Patient complains of pain on the side.  When she lies down on her right side, by the port that was place has pain on that side and the back.  2-3 seconds and then it goes away.  Pain used to be on the left side that’s gone now.  Pain lasts for 2-3 seconds.  Pin is sharp for 2-3 seconds then disappear.  Occurs when she stretches. Does not describe pleurisy.  \par \par Patient states that her HG was 9.4g/dl at dialysis.  \par \par Was transfused at the ER 2 weeks ago for a Hg 6.8g/dl, nazai to 8.4 on the 11th. This past week Hg increased to 7.6g/dl  \par \par Patient now has the Mediport to allow outpatient transfusions again, previously patient had significant problems with vascular acess for the transfusions.

## 2021-11-26 ENCOUNTER — NON-APPOINTMENT (OUTPATIENT)
Age: 61
End: 2021-11-26

## 2021-11-27 ENCOUNTER — OUTPATIENT (OUTPATIENT)
Dept: OUTPATIENT SERVICES | Facility: HOSPITAL | Age: 61
LOS: 1 days | Discharge: HOME | End: 2021-11-27

## 2021-11-27 DIAGNOSIS — T85.898A OTHER SPECIFIED COMPLICATION OF OTHER INTERNAL PROSTHETIC DEVICES, IMPLANTS AND GRAFTS, INITIAL ENCOUNTER: Chronic | ICD-10-CM

## 2021-11-27 DIAGNOSIS — I77.0 ARTERIOVENOUS FISTULA, ACQUIRED: Chronic | ICD-10-CM

## 2021-11-27 DIAGNOSIS — D75.1 SECONDARY POLYCYTHEMIA: ICD-10-CM

## 2021-11-27 DIAGNOSIS — Z99.2 DEPENDENCE ON RENAL DIALYSIS: Chronic | ICD-10-CM

## 2021-11-30 ENCOUNTER — RESULT REVIEW (OUTPATIENT)
Age: 61
End: 2021-11-30

## 2021-11-30 ENCOUNTER — APPOINTMENT (OUTPATIENT)
Dept: INFUSION THERAPY | Facility: HOSPITAL | Age: 61
End: 2021-11-30

## 2021-11-30 LAB
BASOPHILS # BLD AUTO: 0.2 K/UL — SIGNIFICANT CHANGE UP (ref 0–0.2)
BASOPHILS NFR BLD AUTO: 3 % — HIGH (ref 0–2)
DAT C3-SP REAG RBC QL: NEGATIVE — SIGNIFICANT CHANGE UP
DIRECT COOMBS IGG: POSITIVE — SIGNIFICANT CHANGE UP
ELUATE ANTIBODY 1: SIGNIFICANT CHANGE UP
EOSINOPHIL # BLD AUTO: 0.13 K/UL — SIGNIFICANT CHANGE UP (ref 0–0.5)
EOSINOPHIL NFR BLD AUTO: 2 % — SIGNIFICANT CHANGE UP (ref 0–6)
HCT VFR BLD CALC: 28.7 % — LOW (ref 34.5–45)
HGB BLD-MCNC: 8.8 G/DL — LOW (ref 11.5–15.5)
LYMPHOCYTES # BLD AUTO: 0.59 K/UL — LOW (ref 1–3.3)
LYMPHOCYTES # BLD AUTO: 9 % — LOW (ref 13–44)
MCHC RBC-ENTMCNC: 30.7 G/DL — LOW (ref 32–36)
MCHC RBC-ENTMCNC: 30.9 PG — SIGNIFICANT CHANGE UP (ref 27–34)
MCV RBC AUTO: 100.7 FL — HIGH (ref 80–100)
METAMYELOCYTES # FLD: 1 % — HIGH (ref 0–0)
MONOCYTES # BLD AUTO: 0.2 K/UL — SIGNIFICANT CHANGE UP (ref 0–0.9)
MONOCYTES NFR BLD AUTO: 3 % — SIGNIFICANT CHANGE UP (ref 2–14)
MYELOCYTES NFR BLD: 4 % — HIGH (ref 0–0)
NEUTROPHILS # BLD AUTO: 5.13 K/UL — SIGNIFICANT CHANGE UP (ref 1.8–7.4)
NEUTROPHILS NFR BLD AUTO: 78 % — HIGH (ref 43–77)
NRBC # BLD: 2 /100 — HIGH (ref 0–0)
NRBC # BLD: SIGNIFICANT CHANGE UP /100 WBCS (ref 0–0)
PLAT MORPH BLD: NORMAL — SIGNIFICANT CHANGE UP
PLATELET # BLD AUTO: 84 K/UL — LOW (ref 150–400)
RBC # BLD: 2.85 M/UL — LOW (ref 3.8–5.2)
RBC # FLD: 18.2 % — HIGH (ref 10.3–14.5)
RBC BLD AUTO: SIGNIFICANT CHANGE UP
WBC # BLD: 6.58 K/UL — SIGNIFICANT CHANGE UP (ref 3.8–10.5)
WBC # FLD AUTO: 6.58 K/UL — SIGNIFICANT CHANGE UP (ref 3.8–10.5)

## 2021-11-30 PROCEDURE — 86077 PHYS BLOOD BANK SERV XMATCH: CPT

## 2021-12-01 ENCOUNTER — NON-APPOINTMENT (OUTPATIENT)
Age: 61
End: 2021-12-01

## 2021-12-02 ENCOUNTER — APPOINTMENT (OUTPATIENT)
Dept: INFUSION THERAPY | Facility: HOSPITAL | Age: 61
End: 2021-12-02

## 2021-12-02 ENCOUNTER — RESULT REVIEW (OUTPATIENT)
Age: 61
End: 2021-12-02

## 2021-12-02 ENCOUNTER — OUTPATIENT (OUTPATIENT)
Dept: OUTPATIENT SERVICES | Facility: HOSPITAL | Age: 61
LOS: 1 days | End: 2021-12-02
Payer: SELF-PAY

## 2021-12-02 VITALS
HEIGHT: 63 IN | WEIGHT: 116.84 LBS | HEART RATE: 75 BPM | DIASTOLIC BLOOD PRESSURE: 62 MMHG | RESPIRATION RATE: 15 BRPM | OXYGEN SATURATION: 96 % | TEMPERATURE: 98 F | SYSTOLIC BLOOD PRESSURE: 108 MMHG

## 2021-12-02 VITALS
DIASTOLIC BLOOD PRESSURE: 79 MMHG | SYSTOLIC BLOOD PRESSURE: 121 MMHG | HEART RATE: 79 BPM | RESPIRATION RATE: 16 BRPM | OXYGEN SATURATION: 98 %

## 2021-12-02 DIAGNOSIS — Z99.2 DEPENDENCE ON RENAL DIALYSIS: Chronic | ICD-10-CM

## 2021-12-02 DIAGNOSIS — T85.898A OTHER SPECIFIED COMPLICATION OF OTHER INTERNAL PROSTHETIC DEVICES, IMPLANTS AND GRAFTS, INITIAL ENCOUNTER: Chronic | ICD-10-CM

## 2021-12-02 DIAGNOSIS — I77.0 ARTERIOVENOUS FISTULA, ACQUIRED: Chronic | ICD-10-CM

## 2021-12-02 DIAGNOSIS — D75.81 MYELOFIBROSIS: ICD-10-CM

## 2021-12-02 LAB — SARS-COV-2 N GENE NPH QL NAA+PROBE: NOT DETECTED

## 2021-12-02 PROCEDURE — 36597 REPOSITION VENOUS CATHETER: CPT

## 2021-12-02 PROCEDURE — 76000 FLUOROSCOPY <1 HR PHYS/QHP: CPT | Mod: 26

## 2021-12-02 PROCEDURE — 76000 FLUOROSCOPY <1 HR PHYS/QHP: CPT

## 2021-12-02 RX ORDER — FAMOTIDINE 10 MG/ML
1 INJECTION INTRAVENOUS
Qty: 0 | Refills: 0 | DISCHARGE

## 2021-12-02 NOTE — ASU PREOP CHECKLIST - VIA
Please initiate transplant evaluation. Patient will need to see cardiology. He describes what sounds like coronary stenting 6 months ago. Please have him see renal transplant. stretcher

## 2021-12-02 NOTE — ASU DISCHARGE PLAN (ADULT/PEDIATRIC) - NS MD DC FALL RISK RISK
For information on Fall & Injury Prevention, visit: [email or web address] OR  [email or web address] OR  https://www.cdc.gov/steadi/patient.html

## 2021-12-02 NOTE — ASU PATIENT PROFILE, ADULT - PATIENT KNOW
May 4, 2021       Ezekiel Chappell DO  [address] [address]  Via Fax: [phone]      Patient: Rojelio Verdin   YOB: 1946   Date of Visit: 5/4/2021       Dear Dr. Chappell:    Thank you for referring Rojelio Verdin to me for evaluation. Below are my notes for this visit with him.    If you have questions, please do not hesitate to call me. I look forward to following your patient along with you.      Sincerely,        Richard Mckeon MD        CC: No Recipients  Richard Mckeon MD  5/4/2021  9:56 AM  Signed                           [facility]    PCP: Ezekiel Chappell DO    No chief complaint on file.       HPI  Rojelio Verdin is a 75 year old male here today.  Pleasant gentleman with known history for three-vessel bypass surgery but has preserved ejection fraction.  His last echo and nuclear stress test was about 10 months ago EF greater than 55% and normal perfusion.    His biggest issue is worsening of his renal dysfunction with a GFR down to 16.  He follows with Dr. Alin Rubio from the renal services at [facility].    LDL is at 55, HDL 33, triglyceride 106    Past Medical History  Past Medical History:   Diagnosis Date   • CAD (coronary artery disease)    • Diabetes (CMS/HCC)    • Dyslipidemia    • Essential hypertension    • Foot drop, right foot    • Hiatal hernia    • LV dysfunction    • Thyroid disease        Past Surgical History  Past Surgical History:   Procedure Laterality Date   • Coronary artery bypass graft      3 vessel CABG LIMA to LAD OM II RCA SVG to   • Knee arthroscopy w/ acl reconstruction Left        Family History  Family History   Problem Relation Age of Onset   • Coronary Artery Disease Other         Significant for premature CAD.   • Myocardial Infarction Father    • Aneurysm Neg Hx         Negative for AAA       Social History  Social History     Substance and Sexual Activity   Alcohol Use Yes    Comment: rarely          Social History     Tobacco Use   Smoking Status Never Smoker   Smokeless Tobacco Never Used   Tobacco Comment    Never used tobacco. denies smoking     Social History     Substance and Sexual Activity   Drug Use Not on file    Comment: denies use of street drugs       Allergies  ALLERGIES:   Allergen Reactions   • Griseofulvin Other (See Comments)     Oral, GI distress         Presenting Medications  Current Medications    ASPIRIN 325 MG TABLET    Take 325 mg by mouth daily.    BUMETANIDE (BUMEX) 2 MG TABLET    TAKE 1 TABLET DAILY    CARVEDILOL (COREG) 6.25 MG TABLET    Take 1 tablet by mouth 2 times daily (with meals).    FOLIC ACID (FOLATE) 1 MG TABLET    Take 1 mg by mouth daily.    INSULIN DEGLUDEC (TRESIBA FLEXTOUCH) 100 UNIT/ML PEN-INJECTOR    Inject 26 Units into the skin daily.     LEVOTHYROXINE (SYNTHROID, LEVOTHROID) 50 MCG TABLET    Take 50 mcg by mouth daily. Indications: 1 daily    MULTIPLE VITAMIN (MULTIVITAMINS PO)    Take 1 tablet by mouth daily.    NOVOLOG FLEXPEN 100 UNIT/ML PEN-INJECTOR    Inject 36 Units into the skin 3 times daily (before meals). 10 breakfast, 14 and 14 after meals. Sliding scale.    SIMVASTATIN (ZOCOR) 40 MG TABLET    Take 40 mg by mouth daily.    TIOTROPIUM (SPIRIVA RESPIMAT) 2.5 MCG/ACT INHALER    Inhale 2 puffs into the lungs daily.       Review of Systems  Review of Systems   Constitution: Negative for chills, fever, weight gain and weight loss.   HENT: Negative for hearing loss.    Eyes:        Patient denies significant visual changes   Cardiovascular: Negative for chest pain and claudication.        Negative except for what's indicated in HPI   Respiratory: Negative for cough and hemoptysis.    Hematologic/Lymphatic: Does not bruise/bleed easily.   Skin: Negative for rash and suspicious lesions.   Musculoskeletal: Negative for arthritis.   Gastrointestinal: Negative for hematochezia and melena.   Genitourinary: Negative for hematuria.   Neurological:        No  localized deficits   Allergic/Immunologic: Negative for environmental allergies.        No new food allergies       Physical Exam  Visit Vitals  BP (!) 144/64 (BP Location: LUE - Left upper extremity, Patient Position: Sitting, Cuff Size: Large Adult)   Pulse 62   Resp 20   Ht 5' 9\" (1.753 m)   Wt 98.4 kg (217 lb)   BMI 32.05 kg/m²     Body mass index is 32.05 kg/m².  Vital signs were reviewed today.    Physical Exam   Constitutional: He appears healthy. No distress.   Vital signs reviewed   HENT:   Mouth/Throat: Oropharynx is clear.   Eyes: Conjunctivae are normal.   Neck: Thyroid normal. No JVD present.   Cardiovascular: Normal rate, regular rhythm, S1 normal, S2 normal, normal heart sounds, intact distal pulses and normal pulses.   Pulmonary/Chest: Effort normal and breath sounds normal. He has no wheezes. He has no rales. He exhibits no tenderness.   Abdominal: Soft. Bowel sounds are normal.   Musculoskeletal:         General: Edema present. Normal range of motion.      Cervical back: Normal range of motion and neck supple.   Neurological: He is alert and oriented to person, place, and time. He has normal motor skills. Gait normal.   Skin: Skin is warm and dry. No rash noted. No cyanosis. Nails show no clubbing.       Recent Labs  Recent Labs   Lab 11/02/20  0842   CHOLESTEROL 115   HDL 36*   TRIGLYCERIDE 117   CALCULATED LDL 56   CALCULATED NON HDL 79       Recent Labs   Lab 11/02/20  0842   Sodium 141   Chloride 108*   BUN 43*   GFR Estimate, African American 20   BUN/Creatinine Ratio 13   Potassium 4.4   Glucose 106*   Creatinine 3.41*   GFR Estimate, Non  17   CALCIUM 8.0*       Hemoglobin (g/dL)   Date Value   11/30/2017 14.6   .    No results for input(s): WBC, RBC, HGB, HCT, MCV, MCHC, RDWCV, PLT, TLYMPH in the last 8765 hours.      Diagnostic Testing: Patients records were reviewed since last visit for all cardiology testing.  LDL not 55, HDL 33, triglyceride 106, GFR 16    Assessment  1.  Dyspnea on exertion    2. PHU (obstructive sleep apnea)    3. Coronary artery disease involving native heart without angina pectoris, unspecified vessel or lesion type    4. Hypertension, benign    5. Cardiac murmur    6. Type 2 diabetes mellitus without complication, without long-term current use of insulin (CMS/[facility])    7. Hx of CABG    8. Pure hypercholesterolemia         Rojelio Verdin is a 75 year old male known preserved LV cavity mention and function preserved perfusion post bypass surgery lipids are at goal.    Blood pressure is borderline elevated.    Renal dysfunction is worsening.    Recommendations    Patient will follow up with us in 6 months for CMP and a fasting lipid profile he has follow-up visit in the renal services monthly following his renal dysfunction we did have a discussion with the patient and his wife who was present on the phone about future dialysis need as directed per renal services.    Weight reduction and exercise as tolerated    Richard Mckeon MD                yes

## 2021-12-02 NOTE — ASU PATIENT PROFILE, ADULT - FALL HARM RISK - UNIVERSAL INTERVENTIONS
Bed in lowest position, wheels locked, appropriate side rails in place/Call bell, personal items and telephone in reach/Instruct patient to call for assistance before getting out of bed or chair/Non-slip footwear when patient is out of bed/[place] to call system/Physically safe environment - no spills, clutter or unnecessary equipment/Purposeful Proactive Rounding/Room/bathroom lighting operational, light cord in reach

## 2021-12-07 DIAGNOSIS — T82.598A OTHER MECHANICAL COMPLICATION OF OTHER CARDIAC AND VASCULAR DEVICES AND IMPLANTS, INITIAL ENCOUNTER: ICD-10-CM

## 2021-12-07 DIAGNOSIS — D75.81 MYELOFIBROSIS: ICD-10-CM

## 2021-12-09 NOTE — PROVIDER CONTACT NOTE (OTHER) - ASSESSMENT
Additional Area 1 Units: 0
Post-Care Instructions: Patient instructed to not lie down for 4 hours and limit physical activity for 24 hours.
Dilution (U/0.1 Cc): 4
Forehead Units/Cc: 6
Document As Units Or Cc?: units
Pt stayed in bed for most of shift yesterday.
Quantity Per Injection Site (Units Or Cc): 4 U
Quantity Per Injection Site (Units Or Cc): 2 U
pt a&ox4 able to make needs known vitals wdl. pt c/o cramping in left hand post hyperkalemia protocol. left thumb cramped
Quantity Per Injection Site (Units Or Cc): 3 U
Including Pricing Information In The Note: No
Glabellar Complex Units: 20
Detail Level: Detailed
Consent: Written consent obtained. Risks include but not limited to lid/brow ptosis, bruising, swelling, diplopia, temporary effect, incomplete chemical denervation.
Price (Use Numbers Only, No Special Characters Or $): 307

## 2021-12-14 ENCOUNTER — RESULT REVIEW (OUTPATIENT)
Age: 61
End: 2021-12-14

## 2021-12-14 ENCOUNTER — APPOINTMENT (OUTPATIENT)
Dept: INFUSION THERAPY | Facility: HOSPITAL | Age: 61
End: 2021-12-14

## 2021-12-14 LAB
ANISOCYTOSIS BLD QL: SLIGHT — SIGNIFICANT CHANGE UP
BASOPHILS # BLD AUTO: 0.07 K/UL — SIGNIFICANT CHANGE UP (ref 0–0.2)
BASOPHILS NFR BLD AUTO: 1 % — SIGNIFICANT CHANGE UP (ref 0–2)
DACRYOCYTES BLD QL SMEAR: SLIGHT — SIGNIFICANT CHANGE UP
DAT C3-SP REAG RBC QL: NEGATIVE — SIGNIFICANT CHANGE UP
EOSINOPHIL # BLD AUTO: 0.21 K/UL — SIGNIFICANT CHANGE UP (ref 0–0.5)
EOSINOPHIL NFR BLD AUTO: 3 % — SIGNIFICANT CHANGE UP (ref 0–6)
HCT VFR BLD CALC: 28.6 % — LOW (ref 34.5–45)
HGB BLD-MCNC: 8.8 G/DL — LOW (ref 11.5–15.5)
LYMPHOCYTES # BLD AUTO: 1.31 K/UL — SIGNIFICANT CHANGE UP (ref 1–3.3)
LYMPHOCYTES # BLD AUTO: 19 % — SIGNIFICANT CHANGE UP (ref 13–44)
MCHC RBC-ENTMCNC: 30.8 G/DL — LOW (ref 32–36)
MCHC RBC-ENTMCNC: 30.8 PG — SIGNIFICANT CHANGE UP (ref 27–34)
MCV RBC AUTO: 100 FL — SIGNIFICANT CHANGE UP (ref 80–100)
METAMYELOCYTES # FLD: 1 % — HIGH (ref 0–0)
MONOCYTES # BLD AUTO: 0.21 K/UL — SIGNIFICANT CHANGE UP (ref 0–0.9)
MONOCYTES NFR BLD AUTO: 3 % — SIGNIFICANT CHANGE UP (ref 2–14)
MYELOCYTES NFR BLD: 9 % — HIGH (ref 0–0)
NEUTROPHILS # BLD AUTO: 4.4 K/UL — SIGNIFICANT CHANGE UP (ref 1.8–7.4)
NEUTROPHILS NFR BLD AUTO: 64 % — SIGNIFICANT CHANGE UP (ref 43–77)
NRBC # BLD: 2 /100 — HIGH (ref 0–0)
NRBC # BLD: SIGNIFICANT CHANGE UP /100 WBCS (ref 0–0)
PLAT MORPH BLD: NORMAL — SIGNIFICANT CHANGE UP
PLATELET # BLD AUTO: 123 K/UL — LOW (ref 150–400)
POIKILOCYTOSIS BLD QL AUTO: SLIGHT — SIGNIFICANT CHANGE UP
RBC # BLD: 2.86 M/UL — LOW (ref 3.8–5.2)
RBC # FLD: 18.8 % — HIGH (ref 10.3–14.5)
RBC BLD AUTO: ABNORMAL
STOMATOCYTES BLD QL SMEAR: SLIGHT — SIGNIFICANT CHANGE UP
WBC # BLD: 6.88 K/UL — SIGNIFICANT CHANGE UP (ref 3.8–10.5)
WBC # FLD AUTO: 6.88 K/UL — SIGNIFICANT CHANGE UP (ref 3.8–10.5)

## 2021-12-16 ENCOUNTER — APPOINTMENT (OUTPATIENT)
Dept: INFUSION THERAPY | Facility: HOSPITAL | Age: 61
End: 2021-12-16

## 2021-12-22 ENCOUNTER — APPOINTMENT (OUTPATIENT)
Dept: VASCULAR SURGERY | Facility: CLINIC | Age: 61
End: 2021-12-22
Payer: MEDICAID

## 2021-12-22 VITALS
HEIGHT: 62 IN | WEIGHT: 116.84 LBS | DIASTOLIC BLOOD PRESSURE: 73 MMHG | SYSTOLIC BLOOD PRESSURE: 134 MMHG | BODY MASS INDEX: 21.5 KG/M2

## 2021-12-22 DIAGNOSIS — T82.858A STENOSIS OF OTHER VASCULAR PROSTHETIC, INITIAL ENCOUNTER: ICD-10-CM

## 2021-12-22 PROCEDURE — 93990 DOPPLER FLOW TESTING: CPT

## 2021-12-22 PROCEDURE — 99212 OFFICE O/P EST SF 10 MIN: CPT

## 2021-12-22 NOTE — DISCUSSION/SUMMARY
[FreeTextEntry1] : 62 yo female with history of esrd on hd via left upper extremity basilic avf presents for follow up \par \par duplex shows 75% in stent stenosis at the proximal upper arm \par given severe stenosis with pulsatile thrill will arrange for fistulagram

## 2021-12-22 NOTE — PHYSICAL EXAM
[Normal] : no acute distress, well-nourished, well developed, well appearing [Thrill] : thrill [Pulsatile Thrill] : pulsatile thrill [Aneurysm] : aneurysm [Bleeding] : no bleeding [Hand well perfused] : hand well perfused [Ulcer] : no ulcer [de-identified] : intact

## 2021-12-22 NOTE — HISTORY OF PRESENT ILLNESS
[FreeTextEntry1] : 60 yo female with history of esrd on hd via left upper extremity basilic avf presents for follow up \par pt reports some bleeding aafter hd and numbness in the hand when the avf is cleaned [] : left basilic fistula

## 2021-12-23 ENCOUNTER — OUTPATIENT (OUTPATIENT)
Dept: OUTPATIENT SERVICES | Facility: HOSPITAL | Age: 61
LOS: 1 days | Discharge: HOME | End: 2021-12-23

## 2021-12-23 DIAGNOSIS — I77.0 ARTERIOVENOUS FISTULA, ACQUIRED: Chronic | ICD-10-CM

## 2021-12-23 DIAGNOSIS — T85.898A OTHER SPECIFIED COMPLICATION OF OTHER INTERNAL PROSTHETIC DEVICES, IMPLANTS AND GRAFTS, INITIAL ENCOUNTER: Chronic | ICD-10-CM

## 2021-12-23 DIAGNOSIS — Z99.2 DEPENDENCE ON RENAL DIALYSIS: Chronic | ICD-10-CM

## 2021-12-23 DIAGNOSIS — D75.1 SECONDARY POLYCYTHEMIA: ICD-10-CM

## 2021-12-27 NOTE — PATIENT PROFILE ADULT - FUNCTIONAL SCREEN CURRENT LEVEL: SWALLOWING (IF SCORE 2 OR MORE FOR ANY ITEM, CONSULT REHAB SERVICES), MLM)
0 = swallows foods/liquids without difficulty
continue diltriazem with hold parameters  decrease eliquis to 2.5mg po bid

## 2021-12-28 ENCOUNTER — RESULT REVIEW (OUTPATIENT)
Age: 61
End: 2021-12-28

## 2021-12-28 ENCOUNTER — APPOINTMENT (OUTPATIENT)
Dept: INFUSION THERAPY | Facility: HOSPITAL | Age: 61
End: 2021-12-28

## 2021-12-28 LAB
ANISOCYTOSIS BLD QL: SLIGHT — SIGNIFICANT CHANGE UP
BASOPHILS # BLD AUTO: 0 K/UL — SIGNIFICANT CHANGE UP (ref 0–0.2)
BASOPHILS NFR BLD AUTO: 0 % — SIGNIFICANT CHANGE UP (ref 0–2)
DACRYOCYTES BLD QL SMEAR: SLIGHT — SIGNIFICANT CHANGE UP
ELLIPTOCYTES BLD QL SMEAR: SLIGHT — SIGNIFICANT CHANGE UP
EOSINOPHIL # BLD AUTO: 0.2 K/UL — SIGNIFICANT CHANGE UP (ref 0–0.5)
EOSINOPHIL NFR BLD AUTO: 3 % — SIGNIFICANT CHANGE UP (ref 0–6)
HCT VFR BLD CALC: 25.8 % — LOW (ref 34.5–45)
HGB BLD-MCNC: 8 G/DL — LOW (ref 11.5–15.5)
LYMPHOCYTES # BLD AUTO: 0.75 K/UL — LOW (ref 1–3.3)
LYMPHOCYTES # BLD AUTO: 11 % — LOW (ref 13–44)
MCHC RBC-ENTMCNC: 30.8 PG — SIGNIFICANT CHANGE UP (ref 27–34)
MCHC RBC-ENTMCNC: 31 G/DL — LOW (ref 32–36)
MCV RBC AUTO: 99.2 FL — SIGNIFICANT CHANGE UP (ref 80–100)
METAMYELOCYTES # FLD: 1 % — HIGH (ref 0–0)
MONOCYTES # BLD AUTO: 0.34 K/UL — SIGNIFICANT CHANGE UP (ref 0–0.9)
MONOCYTES NFR BLD AUTO: 5 % — SIGNIFICANT CHANGE UP (ref 2–14)
MYELOCYTES NFR BLD: 5 % — HIGH (ref 0–0)
NEUTROPHILS # BLD AUTO: 5.12 K/UL — SIGNIFICANT CHANGE UP (ref 1.8–7.4)
NEUTROPHILS NFR BLD AUTO: 75 % — SIGNIFICANT CHANGE UP (ref 43–77)
NRBC # BLD: 1 /100 — HIGH (ref 0–0)
NRBC # BLD: SIGNIFICANT CHANGE UP /100 WBCS (ref 0–0)
PLAT MORPH BLD: NORMAL — SIGNIFICANT CHANGE UP
PLATELET # BLD AUTO: 116 K/UL — LOW (ref 150–400)
POIKILOCYTOSIS BLD QL AUTO: SLIGHT — SIGNIFICANT CHANGE UP
RBC # BLD: 2.6 M/UL — LOW (ref 3.8–5.2)
RBC # FLD: 19.2 % — HIGH (ref 10.3–14.5)
RBC BLD AUTO: ABNORMAL
WBC # BLD: 6.83 K/UL — SIGNIFICANT CHANGE UP (ref 3.8–10.5)
WBC # FLD AUTO: 6.83 K/UL — SIGNIFICANT CHANGE UP (ref 3.8–10.5)

## 2021-12-29 ENCOUNTER — RESULT REVIEW (OUTPATIENT)
Age: 61
End: 2021-12-29

## 2021-12-29 ENCOUNTER — NON-APPOINTMENT (OUTPATIENT)
Age: 61
End: 2021-12-29

## 2021-12-29 LAB
DAT C3-SP REAG RBC QL: NEGATIVE — SIGNIFICANT CHANGE UP
ELUATE ANTIBODY 1: SIGNIFICANT CHANGE UP

## 2021-12-30 ENCOUNTER — APPOINTMENT (OUTPATIENT)
Dept: INFUSION THERAPY | Facility: HOSPITAL | Age: 61
End: 2021-12-30

## 2022-01-03 DIAGNOSIS — Z51.89 ENCOUNTER FOR OTHER SPECIFIED AFTERCARE: ICD-10-CM

## 2022-01-03 DIAGNOSIS — D64.9 ANEMIA, UNSPECIFIED: ICD-10-CM

## 2022-01-03 DIAGNOSIS — D75.81 MYELOFIBROSIS: ICD-10-CM

## 2022-01-03 DIAGNOSIS — N18.6 END STAGE RENAL DISEASE: ICD-10-CM

## 2022-01-04 ENCOUNTER — NON-APPOINTMENT (OUTPATIENT)
Age: 62
End: 2022-01-04

## 2022-01-11 ENCOUNTER — APPOINTMENT (OUTPATIENT)
Dept: ENDOVASCULAR SURGERY | Facility: CLINIC | Age: 62
End: 2022-01-11
Payer: MEDICAID

## 2022-01-11 ENCOUNTER — APPOINTMENT (OUTPATIENT)
Dept: INFUSION THERAPY | Facility: HOSPITAL | Age: 62
End: 2022-01-11

## 2022-01-11 ENCOUNTER — RESULT REVIEW (OUTPATIENT)
Age: 62
End: 2022-01-11

## 2022-01-11 VITALS
TEMPERATURE: 97.8 F | HEART RATE: 76 BPM | BODY MASS INDEX: 21.35 KG/M2 | RESPIRATION RATE: 16 BRPM | DIASTOLIC BLOOD PRESSURE: 81 MMHG | SYSTOLIC BLOOD PRESSURE: 135 MMHG | HEIGHT: 62 IN | OXYGEN SATURATION: 100 % | WEIGHT: 116 LBS

## 2022-01-11 PROCEDURE — 36907Z: CUSTOM | Mod: 59

## 2022-01-11 PROCEDURE — 36902Z: CUSTOM

## 2022-01-11 NOTE — PAST MEDICAL HISTORY
[Increasing age ( >40 years old)] : Increasing age ( >40 years old) [No therapy indicated for cases scheduled for less than one hour] : No therapy indicated for cases scheduled for less than one hour. [FreeTextEntry1] : Malignant Hyperthermia Screening Tool and Risk of Bleeding Assessment\par \par Ms. MIHYEON HU denies family history of unexpected death following Anesthesia or Exercise.\par Denies Family history of Malignant Hyperthermia, Muscle or Neuromuscular disorder and High Temperature following exercise.\par \par Ms. MIHYEON HU denies history of Muscle Spasm, Dark or Chocolate - Colored urine and Unanticipated fever immediately following anesthesia or serious exercise. \par Ms. BECKMAN also denies bleeding tendencies/ Risks of Bleeding.

## 2022-01-11 NOTE — HISTORY OF PRESENT ILLNESS
[] : left radiocephalic fistula [FreeTextEntry1] : Dr. Dempsey 9/18/17 [FreeTextEntry4] : Yesterday  [FreeTextEntry5] : Yesterday 9pm  [FreeTextEntry6] : Dr. Koroma

## 2022-01-11 NOTE — ASSESSMENT
[FreeTextEntry1] : 60 yo female presents with prolonged bleeding and duplex shows >75% in stent stenosis. plan for left arm fistulogram and possible intervention

## 2022-01-12 ENCOUNTER — RESULT REVIEW (OUTPATIENT)
Age: 62
End: 2022-01-12

## 2022-01-12 ENCOUNTER — APPOINTMENT (OUTPATIENT)
Dept: INFUSION THERAPY | Facility: HOSPITAL | Age: 62
End: 2022-01-12

## 2022-01-12 ENCOUNTER — OUTPATIENT (OUTPATIENT)
Dept: OUTPATIENT SERVICES | Facility: HOSPITAL | Age: 62
LOS: 1 days | End: 2022-01-12
Payer: MEDICAID

## 2022-01-12 DIAGNOSIS — Z99.2 DEPENDENCE ON RENAL DIALYSIS: Chronic | ICD-10-CM

## 2022-01-12 DIAGNOSIS — I77.0 ARTERIOVENOUS FISTULA, ACQUIRED: Chronic | ICD-10-CM

## 2022-01-12 DIAGNOSIS — T85.898A OTHER SPECIFIED COMPLICATION OF OTHER INTERNAL PROSTHETIC DEVICES, IMPLANTS AND GRAFTS, INITIAL ENCOUNTER: Chronic | ICD-10-CM

## 2022-01-12 LAB
ALBUMIN SERPL ELPH-MCNC: 4.4 G/DL — SIGNIFICANT CHANGE UP (ref 3.3–5)
ALP SERPL-CCNC: 145 U/L — HIGH (ref 40–120)
ALT FLD-CCNC: 10 U/L — SIGNIFICANT CHANGE UP (ref 10–45)
ANION GAP SERPL CALC-SCNC: 18 MMOL/L — HIGH (ref 5–17)
ANISOCYTOSIS BLD QL: SLIGHT — SIGNIFICANT CHANGE UP
AST SERPL-CCNC: 19 U/L — SIGNIFICANT CHANGE UP (ref 10–40)
BASOPHILS # BLD AUTO: 0.09 K/UL — SIGNIFICANT CHANGE UP (ref 0–0.2)
BASOPHILS NFR BLD AUTO: 1 % — SIGNIFICANT CHANGE UP (ref 0–2)
BILIRUB SERPL-MCNC: 1.4 MG/DL — HIGH (ref 0.2–1.2)
BUN SERPL-MCNC: 40 MG/DL — HIGH (ref 7–23)
CALCIUM SERPL-MCNC: 8.7 MG/DL — SIGNIFICANT CHANGE UP (ref 8.4–10.5)
CHLORIDE SERPL-SCNC: 99 MMOL/L — SIGNIFICANT CHANGE UP (ref 96–108)
CO2 SERPL-SCNC: 20 MMOL/L — LOW (ref 22–31)
CREAT SERPL-MCNC: 6.53 MG/DL — HIGH (ref 0.5–1.3)
DACRYOCYTES BLD QL SMEAR: SLIGHT — SIGNIFICANT CHANGE UP
DAT C3-SP REAG RBC QL: NEGATIVE — SIGNIFICANT CHANGE UP
ELLIPTOCYTES BLD QL SMEAR: SLIGHT — SIGNIFICANT CHANGE UP
EOSINOPHIL # BLD AUTO: 0.19 K/UL — SIGNIFICANT CHANGE UP (ref 0–0.5)
EOSINOPHIL NFR BLD AUTO: 2 % — SIGNIFICANT CHANGE UP (ref 0–6)
GLUCOSE SERPL-MCNC: 149 MG/DL — HIGH (ref 70–99)
HAPTOGLOB SERPL-MCNC: 32 MG/DL — LOW (ref 34–200)
HCT VFR BLD CALC: 26.7 % — LOW (ref 34.5–45)
HGB BLD-MCNC: 8.3 G/DL — LOW (ref 11.5–15.5)
LDH SERPL L TO P-CCNC: 425 U/L — HIGH (ref 50–242)
LYMPHOCYTES # BLD AUTO: 1.11 K/UL — SIGNIFICANT CHANGE UP (ref 1–3.3)
LYMPHOCYTES # BLD AUTO: 12 % — LOW (ref 13–44)
MCHC RBC-ENTMCNC: 29.7 PG — SIGNIFICANT CHANGE UP (ref 27–34)
MCHC RBC-ENTMCNC: 31.1 G/DL — LOW (ref 32–36)
MCV RBC AUTO: 95.7 FL — SIGNIFICANT CHANGE UP (ref 80–100)
MONOCYTES # BLD AUTO: 0.19 K/UL — SIGNIFICANT CHANGE UP (ref 0–0.9)
MONOCYTES NFR BLD AUTO: 2 % — SIGNIFICANT CHANGE UP (ref 2–14)
MYELOCYTES NFR BLD: 3 % — HIGH (ref 0–0)
NEUTROPHILS # BLD AUTO: 7.43 K/UL — HIGH (ref 1.8–7.4)
NEUTROPHILS NFR BLD AUTO: 80 % — HIGH (ref 43–77)
NRBC # BLD: 1 /100 — HIGH (ref 0–0)
NRBC # BLD: SIGNIFICANT CHANGE UP /100 WBCS (ref 0–0)
PLAT MORPH BLD: NORMAL — SIGNIFICANT CHANGE UP
PLATELET # BLD AUTO: 140 K/UL — LOW (ref 150–400)
POIKILOCYTOSIS BLD QL AUTO: SLIGHT — SIGNIFICANT CHANGE UP
POTASSIUM SERPL-MCNC: 5.1 MMOL/L — SIGNIFICANT CHANGE UP (ref 3.5–5.3)
POTASSIUM SERPL-SCNC: 5.1 MMOL/L — SIGNIFICANT CHANGE UP (ref 3.5–5.3)
PROT SERPL-MCNC: 7.5 G/DL — SIGNIFICANT CHANGE UP (ref 6–8.3)
RBC # BLD: 2.79 M/UL — LOW (ref 3.8–5.2)
RBC # FLD: 21.1 % — HIGH (ref 10.3–14.5)
RBC BLD AUTO: ABNORMAL
RETICS #: 93.4 K/UL — SIGNIFICANT CHANGE UP (ref 25–125)
RETICS/RBC NFR: 3.3 % — HIGH (ref 0.5–2.5)
SODIUM SERPL-SCNC: 136 MMOL/L — SIGNIFICANT CHANGE UP (ref 135–145)
WBC # BLD: 9.29 K/UL — SIGNIFICANT CHANGE UP (ref 3.8–10.5)
WBC # FLD AUTO: 9.29 K/UL — SIGNIFICANT CHANGE UP (ref 3.8–10.5)

## 2022-01-12 PROCEDURE — 86880 COOMBS TEST DIRECT: CPT

## 2022-01-12 PROCEDURE — 86860 RBC ANTIBODY ELUTION: CPT

## 2022-01-12 PROCEDURE — 86900 BLOOD TYPING SEROLOGIC ABO: CPT

## 2022-01-12 PROCEDURE — 86901 BLOOD TYPING SEROLOGIC RH(D): CPT

## 2022-01-12 PROCEDURE — 86902 BLOOD TYPE ANTIGEN DONOR EA: CPT

## 2022-01-12 PROCEDURE — 86922 COMPATIBILITY TEST ANTIGLOB: CPT

## 2022-01-12 PROCEDURE — 86850 RBC ANTIBODY SCREEN: CPT

## 2022-01-12 PROCEDURE — 86870 RBC ANTIBODY IDENTIFICATION: CPT

## 2022-01-12 NOTE — ED PROVIDER NOTE - PR
· Initial CTH 1/11  · NAICA  · Repeat overnight 1/12 given headache: NAICA  · Initial CTA 1/11:  · No LVO  · TPA administered at 1743 on 1/11  · MRI pending  · Neurology following  · NIHSS on arrival 5  · Left hemiparesis, dysarthria, facial droop,   · Current Neuro Exam:  · Mild left facial droop with decreased sensation lower facial nerve region on left, subtle 4/5 weakness on left upper and lower extremity that can oppose gravity fully but slightly decreased  strength, overshoots finger to nose test  · Continue stroke pathway  · MRI pending  · Antiplatelet Plan start ASA if serial CTH normal  · TTE pending  · High Intensity Statin  · Lipid Panel Pending  · Blood pressure goal:  · Permissive hypertension with goal SBP < 180  · Continue telemetry  · PT/OT/Speech following  · PMR consult  · Hemoglobin A1C: pending 144

## 2022-01-13 ENCOUNTER — APPOINTMENT (OUTPATIENT)
Dept: INFUSION THERAPY | Facility: HOSPITAL | Age: 62
End: 2022-01-13

## 2022-01-13 DIAGNOSIS — D64.9 ANEMIA, UNSPECIFIED: ICD-10-CM

## 2022-01-13 PROCEDURE — 86077 PHYS BLOOD BANK SERV XMATCH: CPT

## 2022-01-18 ENCOUNTER — APPOINTMENT (OUTPATIENT)
Dept: INFUSION THERAPY | Facility: HOSPITAL | Age: 62
End: 2022-01-18

## 2022-01-21 ENCOUNTER — OUTPATIENT (OUTPATIENT)
Dept: OUTPATIENT SERVICES | Facility: HOSPITAL | Age: 62
LOS: 1 days | Discharge: HOME | End: 2022-01-21

## 2022-01-21 DIAGNOSIS — I77.0 ARTERIOVENOUS FISTULA, ACQUIRED: Chronic | ICD-10-CM

## 2022-01-21 DIAGNOSIS — Z99.2 DEPENDENCE ON RENAL DIALYSIS: Chronic | ICD-10-CM

## 2022-01-21 DIAGNOSIS — D64.9 ANEMIA, UNSPECIFIED: ICD-10-CM

## 2022-01-21 DIAGNOSIS — T85.898A OTHER SPECIFIED COMPLICATION OF OTHER INTERNAL PROSTHETIC DEVICES, IMPLANTS AND GRAFTS, INITIAL ENCOUNTER: Chronic | ICD-10-CM

## 2022-01-25 ENCOUNTER — APPOINTMENT (OUTPATIENT)
Dept: INFUSION THERAPY | Facility: HOSPITAL | Age: 62
End: 2022-01-25

## 2022-01-25 ENCOUNTER — RESULT REVIEW (OUTPATIENT)
Age: 62
End: 2022-01-25

## 2022-01-25 LAB
ANISOCYTOSIS BLD QL: SLIGHT — SIGNIFICANT CHANGE UP
BASOPHILS # BLD AUTO: 0 K/UL — SIGNIFICANT CHANGE UP (ref 0–0.2)
BASOPHILS NFR BLD AUTO: 0 % — SIGNIFICANT CHANGE UP (ref 0–2)
DACRYOCYTES BLD QL SMEAR: SLIGHT — SIGNIFICANT CHANGE UP
DAT C3-SP REAG RBC QL: NEGATIVE — SIGNIFICANT CHANGE UP
ELLIPTOCYTES BLD QL SMEAR: SLIGHT — SIGNIFICANT CHANGE UP
ELUATE ANTIBODY 1: SIGNIFICANT CHANGE UP
EOSINOPHIL # BLD AUTO: 0.35 K/UL — SIGNIFICANT CHANGE UP (ref 0–0.5)
EOSINOPHIL NFR BLD AUTO: 4 % — SIGNIFICANT CHANGE UP (ref 0–6)
HCT VFR BLD CALC: 30 % — LOW (ref 34.5–45)
HGB BLD-MCNC: 9.2 G/DL — LOW (ref 11.5–15.5)
LYMPHOCYTES # BLD AUTO: 0.95 K/UL — LOW (ref 1–3.3)
LYMPHOCYTES # BLD AUTO: 11 % — LOW (ref 13–44)
MCHC RBC-ENTMCNC: 29.5 PG — SIGNIFICANT CHANGE UP (ref 27–34)
MCHC RBC-ENTMCNC: 30.7 G/DL — LOW (ref 32–36)
MCV RBC AUTO: 96.2 FL — SIGNIFICANT CHANGE UP (ref 80–100)
METAMYELOCYTES # FLD: 1 % — HIGH (ref 0–0)
MICROCYTES BLD QL: SLIGHT — SIGNIFICANT CHANGE UP
MONOCYTES # BLD AUTO: 0.09 K/UL — SIGNIFICANT CHANGE UP (ref 0–0.9)
MONOCYTES NFR BLD AUTO: 1 % — LOW (ref 2–14)
MYELOCYTES NFR BLD: 5 % — HIGH (ref 0–0)
NEUTROPHILS # BLD AUTO: 6.73 K/UL — SIGNIFICANT CHANGE UP (ref 1.8–7.4)
NEUTROPHILS NFR BLD AUTO: 78 % — HIGH (ref 43–77)
NRBC # BLD: 3 /100 — HIGH (ref 0–0)
NRBC # BLD: SIGNIFICANT CHANGE UP /100 WBCS (ref 0–0)
PLAT MORPH BLD: NORMAL — SIGNIFICANT CHANGE UP
PLATELET # BLD AUTO: 136 K/UL — LOW (ref 150–400)
POIKILOCYTOSIS BLD QL AUTO: SLIGHT — SIGNIFICANT CHANGE UP
RBC # BLD: 3.12 M/UL — LOW (ref 3.8–5.2)
RBC # FLD: 21.1 % — HIGH (ref 10.3–14.5)
RBC BLD AUTO: ABNORMAL
WBC # BLD: 8.63 K/UL — SIGNIFICANT CHANGE UP (ref 3.8–10.5)
WBC # FLD AUTO: 8.63 K/UL — SIGNIFICANT CHANGE UP (ref 3.8–10.5)

## 2022-01-25 PROCEDURE — 86077 PHYS BLOOD BANK SERV XMATCH: CPT

## 2022-01-27 ENCOUNTER — APPOINTMENT (OUTPATIENT)
Dept: INFUSION THERAPY | Facility: HOSPITAL | Age: 62
End: 2022-01-27

## 2022-02-09 ENCOUNTER — RESULT REVIEW (OUTPATIENT)
Age: 62
End: 2022-02-09

## 2022-02-09 ENCOUNTER — APPOINTMENT (OUTPATIENT)
Dept: INFUSION THERAPY | Facility: HOSPITAL | Age: 62
End: 2022-02-09

## 2022-02-09 LAB
ALBUMIN SERPL ELPH-MCNC: 4.3 G/DL — SIGNIFICANT CHANGE UP (ref 3.3–5)
ALP SERPL-CCNC: 120 U/L — SIGNIFICANT CHANGE UP (ref 40–120)
ALT FLD-CCNC: 17 U/L — SIGNIFICANT CHANGE UP (ref 10–45)
ANION GAP SERPL CALC-SCNC: 24 MMOL/L — HIGH (ref 5–17)
ANISOCYTOSIS BLD QL: SLIGHT — SIGNIFICANT CHANGE UP
AST SERPL-CCNC: 28 U/L — SIGNIFICANT CHANGE UP (ref 10–40)
BASOPHILS # BLD AUTO: 0 K/UL — SIGNIFICANT CHANGE UP (ref 0–0.2)
BASOPHILS NFR BLD AUTO: 0 % — SIGNIFICANT CHANGE UP (ref 0–2)
BILIRUB SERPL-MCNC: 1.6 MG/DL — HIGH (ref 0.2–1.2)
BUN SERPL-MCNC: 47 MG/DL — HIGH (ref 7–23)
CALCIUM SERPL-MCNC: 9.1 MG/DL — SIGNIFICANT CHANGE UP (ref 8.4–10.5)
CHLORIDE SERPL-SCNC: 97 MMOL/L — SIGNIFICANT CHANGE UP (ref 96–108)
CO2 SERPL-SCNC: 17 MMOL/L — LOW (ref 22–31)
CREAT SERPL-MCNC: 7.73 MG/DL — HIGH (ref 0.5–1.3)
DACRYOCYTES BLD QL SMEAR: SLIGHT — SIGNIFICANT CHANGE UP
ELLIPTOCYTES BLD QL SMEAR: SLIGHT — SIGNIFICANT CHANGE UP
EOSINOPHIL # BLD AUTO: 0.1 K/UL — SIGNIFICANT CHANGE UP (ref 0–0.5)
EOSINOPHIL NFR BLD AUTO: 1 % — SIGNIFICANT CHANGE UP (ref 0–6)
GLUCOSE SERPL-MCNC: 150 MG/DL — HIGH (ref 70–99)
HAPTOGLOB SERPL-MCNC: <20 MG/DL — LOW (ref 34–200)
HCT VFR BLD CALC: 27.7 % — LOW (ref 34.5–45)
HGB BLD-MCNC: 8.4 G/DL — LOW (ref 11.5–15.5)
LDH SERPL L TO P-CCNC: 492 U/L — HIGH (ref 50–242)
LYMPHOCYTES # BLD AUTO: 1.1 K/UL — SIGNIFICANT CHANGE UP (ref 1–3.3)
LYMPHOCYTES # BLD AUTO: 11 % — LOW (ref 13–44)
MCHC RBC-ENTMCNC: 29.9 PG — SIGNIFICANT CHANGE UP (ref 27–34)
MCHC RBC-ENTMCNC: 30.3 G/DL — LOW (ref 32–36)
MCV RBC AUTO: 98.6 FL — SIGNIFICANT CHANGE UP (ref 80–100)
METAMYELOCYTES # FLD: 2 % — HIGH (ref 0–0)
MICROCYTES BLD QL: SLIGHT — SIGNIFICANT CHANGE UP
MONOCYTES # BLD AUTO: 0.4 K/UL — SIGNIFICANT CHANGE UP (ref 0–0.9)
MONOCYTES NFR BLD AUTO: 4 % — SIGNIFICANT CHANGE UP (ref 2–14)
MYELOCYTES NFR BLD: 4 % — HIGH (ref 0–0)
NEUTROPHILS # BLD AUTO: 7.83 K/UL — HIGH (ref 1.8–7.4)
NEUTROPHILS NFR BLD AUTO: 78 % — HIGH (ref 43–77)
NRBC # BLD: 2 /100 — HIGH (ref 0–0)
NRBC # BLD: SIGNIFICANT CHANGE UP /100 WBCS (ref 0–0)
PLAT MORPH BLD: NORMAL — SIGNIFICANT CHANGE UP
PLATELET # BLD AUTO: 180 K/UL — SIGNIFICANT CHANGE UP (ref 150–400)
POIKILOCYTOSIS BLD QL AUTO: SLIGHT — SIGNIFICANT CHANGE UP
POTASSIUM SERPL-MCNC: 5.2 MMOL/L — SIGNIFICANT CHANGE UP (ref 3.5–5.3)
POTASSIUM SERPL-SCNC: 5.2 MMOL/L — SIGNIFICANT CHANGE UP (ref 3.5–5.3)
PROT SERPL-MCNC: 7.5 G/DL — SIGNIFICANT CHANGE UP (ref 6–8.3)
RBC # BLD: 2.81 M/UL — LOW (ref 3.8–5.2)
RBC # FLD: 21.6 % — HIGH (ref 10.3–14.5)
RBC BLD AUTO: ABNORMAL
RETICS #: 98.4 K/UL — SIGNIFICANT CHANGE UP (ref 25–125)
RETICS/RBC NFR: 3.6 % — HIGH (ref 0.5–2.5)
SODIUM SERPL-SCNC: 138 MMOL/L — SIGNIFICANT CHANGE UP (ref 135–145)
WBC # BLD: 10.04 K/UL — SIGNIFICANT CHANGE UP (ref 3.8–10.5)
WBC # FLD AUTO: 10.04 K/UL — SIGNIFICANT CHANGE UP (ref 3.8–10.5)

## 2022-02-10 DIAGNOSIS — D75.81 MYELOFIBROSIS: ICD-10-CM

## 2022-02-17 ENCOUNTER — OUTPATIENT (OUTPATIENT)
Dept: OUTPATIENT SERVICES | Facility: HOSPITAL | Age: 62
LOS: 1 days | Discharge: HOME | End: 2022-02-17

## 2022-02-17 DIAGNOSIS — T85.898A OTHER SPECIFIED COMPLICATION OF OTHER INTERNAL PROSTHETIC DEVICES, IMPLANTS AND GRAFTS, INITIAL ENCOUNTER: Chronic | ICD-10-CM

## 2022-02-17 DIAGNOSIS — Z99.2 DEPENDENCE ON RENAL DIALYSIS: Chronic | ICD-10-CM

## 2022-02-17 DIAGNOSIS — D64.9 ANEMIA, UNSPECIFIED: ICD-10-CM

## 2022-02-17 DIAGNOSIS — I77.0 ARTERIOVENOUS FISTULA, ACQUIRED: Chronic | ICD-10-CM

## 2022-02-22 ENCOUNTER — RESULT REVIEW (OUTPATIENT)
Age: 62
End: 2022-02-22

## 2022-02-22 ENCOUNTER — APPOINTMENT (OUTPATIENT)
Dept: INFUSION THERAPY | Facility: HOSPITAL | Age: 62
End: 2022-02-22

## 2022-02-22 DIAGNOSIS — D75.81 MYELOFIBROSIS: ICD-10-CM

## 2022-02-22 DIAGNOSIS — D69.3 IMMUNE THROMBOCYTOPENIC PURPURA: ICD-10-CM

## 2022-02-22 LAB
BASOPHILS # BLD AUTO: 0.35 K/UL — HIGH (ref 0–0.2)
BASOPHILS NFR BLD AUTO: 5 % — HIGH (ref 0–2)
EOSINOPHIL # BLD AUTO: 0.07 K/UL — SIGNIFICANT CHANGE UP (ref 0–0.5)
EOSINOPHIL NFR BLD AUTO: 1 % — SIGNIFICANT CHANGE UP (ref 0–6)
HCT VFR BLD CALC: 26.9 % — LOW (ref 34.5–45)
HGB BLD-MCNC: 8.2 G/DL — LOW (ref 11.5–15.5)
LYMPHOCYTES # BLD AUTO: 1.26 K/UL — SIGNIFICANT CHANGE UP (ref 1–3.3)
LYMPHOCYTES # BLD AUTO: 18 % — SIGNIFICANT CHANGE UP (ref 13–44)
MCHC RBC-ENTMCNC: 30.5 G/DL — LOW (ref 32–36)
MCHC RBC-ENTMCNC: 31.1 PG — SIGNIFICANT CHANGE UP (ref 27–34)
MCV RBC AUTO: 101.9 FL — HIGH (ref 80–100)
METAMYELOCYTES # FLD: 2 % — HIGH (ref 0–0)
MONOCYTES # BLD AUTO: 0.28 K/UL — SIGNIFICANT CHANGE UP (ref 0–0.9)
MONOCYTES NFR BLD AUTO: 4 % — SIGNIFICANT CHANGE UP (ref 2–14)
MYELOCYTES NFR BLD: 2 % — HIGH (ref 0–0)
NEUTROPHILS # BLD AUTO: 4.75 K/UL — SIGNIFICANT CHANGE UP (ref 1.8–7.4)
NEUTROPHILS NFR BLD AUTO: 68 % — SIGNIFICANT CHANGE UP (ref 43–77)
NRBC # BLD: 3 /100 — HIGH (ref 0–0)
NRBC # BLD: SIGNIFICANT CHANGE UP /100 WBCS (ref 0–0)
PLAT MORPH BLD: NORMAL — SIGNIFICANT CHANGE UP
PLATELET # BLD AUTO: 121 K/UL — LOW (ref 150–400)
RBC # BLD: 2.64 M/UL — LOW (ref 3.8–5.2)
RBC # FLD: 22.1 % — HIGH (ref 10.3–14.5)
RBC BLD AUTO: SIGNIFICANT CHANGE UP
WBC # BLD: 6.99 K/UL — SIGNIFICANT CHANGE UP (ref 3.8–10.5)
WBC # FLD AUTO: 6.99 K/UL — SIGNIFICANT CHANGE UP (ref 3.8–10.5)

## 2022-03-08 ENCOUNTER — RESULT REVIEW (OUTPATIENT)
Age: 62
End: 2022-03-08

## 2022-03-08 ENCOUNTER — APPOINTMENT (OUTPATIENT)
Dept: INFUSION THERAPY | Facility: HOSPITAL | Age: 62
End: 2022-03-08

## 2022-03-08 LAB
BASOPHILS # BLD AUTO: 0 K/UL — SIGNIFICANT CHANGE UP (ref 0–0.2)
BASOPHILS NFR BLD AUTO: 0 % — SIGNIFICANT CHANGE UP (ref 0–2)
EOSINOPHIL # BLD AUTO: 0.17 K/UL — SIGNIFICANT CHANGE UP (ref 0–0.5)
EOSINOPHIL NFR BLD AUTO: 2 % — SIGNIFICANT CHANGE UP (ref 0–6)
HCT VFR BLD CALC: 27.9 % — LOW (ref 34.5–45)
HGB BLD-MCNC: 8.7 G/DL — LOW (ref 11.5–15.5)
LYMPHOCYTES # BLD AUTO: 0.96 K/UL — LOW (ref 1–3.3)
LYMPHOCYTES # BLD AUTO: 11 % — LOW (ref 13–44)
MCHC RBC-ENTMCNC: 31.2 G/DL — LOW (ref 32–36)
MCHC RBC-ENTMCNC: 32.1 PG — SIGNIFICANT CHANGE UP (ref 27–34)
MCV RBC AUTO: 103 FL — HIGH (ref 80–100)
METAMYELOCYTES # FLD: 2 % — HIGH (ref 0–0)
MONOCYTES # BLD AUTO: 0.17 K/UL — SIGNIFICANT CHANGE UP (ref 0–0.9)
MONOCYTES NFR BLD AUTO: 2 % — SIGNIFICANT CHANGE UP (ref 2–14)
MYELOCYTES NFR BLD: 2 % — HIGH (ref 0–0)
NEUTROPHILS # BLD AUTO: 7.04 K/UL — SIGNIFICANT CHANGE UP (ref 1.8–7.4)
NEUTROPHILS NFR BLD AUTO: 81 % — HIGH (ref 43–77)
NRBC # BLD: 2 /100 — HIGH (ref 0–0)
NRBC # BLD: SIGNIFICANT CHANGE UP /100 WBCS (ref 0–0)
PLAT MORPH BLD: NORMAL — SIGNIFICANT CHANGE UP
PLATELET # BLD AUTO: 148 K/UL — LOW (ref 150–400)
RBC # BLD: 2.71 M/UL — LOW (ref 3.8–5.2)
RBC # FLD: 21.9 % — HIGH (ref 10.3–14.5)
RBC BLD AUTO: SIGNIFICANT CHANGE UP
WBC # BLD: 8.69 K/UL — SIGNIFICANT CHANGE UP (ref 3.8–10.5)
WBC # FLD AUTO: 8.69 K/UL — SIGNIFICANT CHANGE UP (ref 3.8–10.5)

## 2022-03-16 ENCOUNTER — OUTPATIENT (OUTPATIENT)
Dept: OUTPATIENT SERVICES | Facility: HOSPITAL | Age: 62
LOS: 1 days | End: 2022-03-16
Payer: SELF-PAY

## 2022-03-16 ENCOUNTER — APPOINTMENT (OUTPATIENT)
Dept: INTERNAL MEDICINE | Facility: CLINIC | Age: 62
End: 2022-03-16
Payer: COMMERCIAL

## 2022-03-16 VITALS
HEART RATE: 84 BPM | BODY MASS INDEX: 22.08 KG/M2 | DIASTOLIC BLOOD PRESSURE: 76 MMHG | OXYGEN SATURATION: 98 % | HEIGHT: 62 IN | WEIGHT: 120 LBS | SYSTOLIC BLOOD PRESSURE: 118 MMHG

## 2022-03-16 DIAGNOSIS — I77.0 ARTERIOVENOUS FISTULA, ACQUIRED: Chronic | ICD-10-CM

## 2022-03-16 DIAGNOSIS — Z99.2 DEPENDENCE ON RENAL DIALYSIS: Chronic | ICD-10-CM

## 2022-03-16 DIAGNOSIS — I10 ESSENTIAL (PRIMARY) HYPERTENSION: ICD-10-CM

## 2022-03-16 DIAGNOSIS — T85.898A OTHER SPECIFIED COMPLICATION OF OTHER INTERNAL PROSTHETIC DEVICES, IMPLANTS AND GRAFTS, INITIAL ENCOUNTER: Chronic | ICD-10-CM

## 2022-03-16 PROCEDURE — 93005 ELECTROCARDIOGRAM TRACING: CPT

## 2022-03-16 PROCEDURE — T1013: CPT

## 2022-03-16 PROCEDURE — ZZZZZ: CPT

## 2022-03-16 PROCEDURE — G0463: CPT | Mod: 25

## 2022-03-16 NOTE — PLAN
[FreeTextEntry1] : #post-dialysis nause\par -ekg performed in office, qtc 397msec\par -zofran after dialysis days when nausea\par -can return if continued symptoms and can discuss with nephrologist. \par \par #LUE fistula stenosis\par -Status post balloon dilation 2mo ago and next balloon dilation in one month. \par \par #Anemia\par -stable, not requiring recent transfusion. Continue to monitor H/H and symptoms.\par \par #Polycythemia Vera\par -as per heme onc, danazol and jakafi \par \par Follow up in 15 weeks.

## 2022-03-16 NOTE — REVIEW OF SYSTEMS
[Abdominal Pain] : abdominal pain [Nausea] : nausea [Fever] : no fever [Chills] : no chills [Fatigue] : no fatigue [Chest Pain] : no chest pain [Palpitations] : no palpitations [Shortness Of Breath] : no shortness of breath [Wheezing] : no wheezing [Cough] : no cough [Vomiting] : no vomiting [FreeTextEntry7] : LUQ abdominal pain  [de-identified] : fatigue

## 2022-03-16 NOTE — HISTORY OF PRESENT ILLNESS
[[employer] ] : provided by [employer]   [Time Spent: ____ minutes] : Total time spent using  services: [unfilled] minutes. The patient's primary language is not English thus required  services. 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 \par \par Interval Hx-7 times in hospital in the past year. in hospital for anemia for 4 times. 3 times related to coronavirus April 2021. 8.1 on recent bloodwork hgb. Transfusion 3mo was last one. Taking danazol every day. . jakafi after dialysis. continuing to take medication as prescribed.\par \par anemia- sometimes SOB but does not affect daily activity happens on exertion with fast speech or fast walking. No dizziness. blurry vision at times mostly due to old age. no fainting, no falling. \par \par Fistula stenosis-Fistula with balloon inserted 2mo ago and scheduled again next month. Able to get dialysis no problem follow with vascular surgeon. \par \par Abdominal/Rib Pain- "hx of spleen swelling and that is why it hurts". Previously was a pressure now it is a poking pain. not worsened by movement. Can be reproduced with palpation. Now with myelofibrosis causing rib pain per patient.\par \par Dialysis- feels nausea and fatigue needs to take a nap after dialysis requesting assistance for these symptoms. Nephrologist told her that this can happen and advised increased po intake and takes famotidine and gabapentin for pain in legs.  Also feels tired after breakfast on daily basis. Will sometimes vomit "faint vomiting" of sputum. \par \par GERD- acid reflux managed with famotidine. danazol can cause the reflux. \par \par Vaccination- Hep B nonresponder. Pfizer booster 2/2022.

## 2022-03-16 NOTE — ASSESSMENT
[FreeTextEntry1] : 58 woman with a history of Polycythemia Vera, MAK 2 + since 2012. Patient with history of splenic vein thrombosis (2015), ESRD on HD (Tu/Th/Sat) via LUE AVF (2/2 chronic GN, started Dec 2017) formerly on peritoneal dialysis, HTN presenting with post-dialysis nausea.

## 2022-03-16 NOTE — PHYSICAL EXAM
[No Acute Distress] : no acute distress [Well Nourished] : well nourished [No JVD] : no jugular venous distention [No Respiratory Distress] : no respiratory distress  [No Accessory Muscle Use] : no accessory muscle use [Clear to Auscultation] : lungs were clear to auscultation bilaterally [Normal Rate] : normal rate  [Regular Rhythm] : with a regular rhythm [Soft] : abdomen soft [No HSM] : no HSM [Non-distended] : non-distended [Normal Bowel Sounds] : normal bowel sounds [Coordination Grossly Intact] : coordination grossly intact [No Focal Deficits] : no focal deficits [Normal Gait] : normal gait [Normal Affect] : the affect was normal [Normal Insight/Judgement] : insight and judgment were intact [de-identified] : murmur 2nd Left ICS  [de-identified] : tenderness to palpation LUQ

## 2022-03-21 DIAGNOSIS — R11.0 NAUSEA: ICD-10-CM

## 2022-03-22 NOTE — ED ADULT TRIAGE NOTE - BMI (KG/M2)

## 2022-03-30 ENCOUNTER — OUTPATIENT (OUTPATIENT)
Dept: OUTPATIENT SERVICES | Facility: HOSPITAL | Age: 62
LOS: 1 days | Discharge: HOME | End: 2022-03-30

## 2022-03-30 DIAGNOSIS — D64.9 ANEMIA, UNSPECIFIED: ICD-10-CM

## 2022-03-30 DIAGNOSIS — I77.0 ARTERIOVENOUS FISTULA, ACQUIRED: Chronic | ICD-10-CM

## 2022-03-30 DIAGNOSIS — T85.898A OTHER SPECIFIED COMPLICATION OF OTHER INTERNAL PROSTHETIC DEVICES, IMPLANTS AND GRAFTS, INITIAL ENCOUNTER: Chronic | ICD-10-CM

## 2022-03-30 DIAGNOSIS — Z99.2 DEPENDENCE ON RENAL DIALYSIS: Chronic | ICD-10-CM

## 2022-03-31 ENCOUNTER — RESULT REVIEW (OUTPATIENT)
Age: 62
End: 2022-03-31

## 2022-03-31 ENCOUNTER — APPOINTMENT (OUTPATIENT)
Dept: HEMATOLOGY ONCOLOGY | Facility: CLINIC | Age: 62
End: 2022-03-31

## 2022-03-31 LAB
ANISOCYTOSIS BLD QL: SLIGHT — SIGNIFICANT CHANGE UP
BASOPHILS # BLD AUTO: 0 K/UL — SIGNIFICANT CHANGE UP (ref 0–0.2)
BASOPHILS NFR BLD AUTO: 0 % — SIGNIFICANT CHANGE UP (ref 0–2)
DACRYOCYTES BLD QL SMEAR: SLIGHT — SIGNIFICANT CHANGE UP
ELLIPTOCYTES BLD QL SMEAR: SLIGHT — SIGNIFICANT CHANGE UP
EOSINOPHIL # BLD AUTO: 0.17 K/UL — SIGNIFICANT CHANGE UP (ref 0–0.5)
EOSINOPHIL NFR BLD AUTO: 2 % — SIGNIFICANT CHANGE UP (ref 0–6)
HCT VFR BLD CALC: 27.4 % — LOW (ref 34.5–45)
HGB BLD-MCNC: 8.8 G/DL — LOW (ref 11.5–15.5)
LYMPHOCYTES # BLD AUTO: 1.51 K/UL — SIGNIFICANT CHANGE UP (ref 1–3.3)
LYMPHOCYTES # BLD AUTO: 18 % — SIGNIFICANT CHANGE UP (ref 13–44)
MACROCYTES BLD QL: SLIGHT — SIGNIFICANT CHANGE UP
MCHC RBC-ENTMCNC: 32.1 G/DL — SIGNIFICANT CHANGE UP (ref 32–36)
MCHC RBC-ENTMCNC: 33.1 PG — SIGNIFICANT CHANGE UP (ref 27–34)
MCV RBC AUTO: 103 FL — HIGH (ref 80–100)
METAMYELOCYTES # FLD: 2 % — HIGH (ref 0–0)
MONOCYTES # BLD AUTO: 0.5 K/UL — SIGNIFICANT CHANGE UP (ref 0–0.9)
MONOCYTES NFR BLD AUTO: 6 % — SIGNIFICANT CHANGE UP (ref 2–14)
MYELOCYTES NFR BLD: 4 % — HIGH (ref 0–0)
NEUTROPHILS # BLD AUTO: 5.72 K/UL — SIGNIFICANT CHANGE UP (ref 1.8–7.4)
NEUTROPHILS NFR BLD AUTO: 68 % — SIGNIFICANT CHANGE UP (ref 43–77)
NRBC # BLD: 5 /100 — HIGH (ref 0–0)
NRBC # BLD: SIGNIFICANT CHANGE UP /100 WBCS (ref 0–0)
PLAT MORPH BLD: NORMAL — SIGNIFICANT CHANGE UP
PLATELET # BLD AUTO: 152 K/UL — SIGNIFICANT CHANGE UP (ref 150–400)
POIKILOCYTOSIS BLD QL AUTO: SLIGHT — SIGNIFICANT CHANGE UP
RBC # BLD: 2.66 M/UL — LOW (ref 3.8–5.2)
RBC # FLD: 20.1 % — HIGH (ref 10.3–14.5)
RBC BLD AUTO: ABNORMAL
WBC # BLD: 8.41 K/UL — SIGNIFICANT CHANGE UP (ref 3.8–10.5)
WBC # FLD AUTO: 8.41 K/UL — SIGNIFICANT CHANGE UP (ref 3.8–10.5)

## 2022-04-07 ENCOUNTER — RESULT REVIEW (OUTPATIENT)
Age: 62
End: 2022-04-07

## 2022-04-07 ENCOUNTER — APPOINTMENT (OUTPATIENT)
Dept: ENDOVASCULAR SURGERY | Facility: CLINIC | Age: 62
End: 2022-04-07
Payer: MEDICAID

## 2022-04-07 VITALS
OXYGEN SATURATION: 100 % | SYSTOLIC BLOOD PRESSURE: 132 MMHG | WEIGHT: 116.84 LBS | DIASTOLIC BLOOD PRESSURE: 81 MMHG | BODY MASS INDEX: 21.5 KG/M2 | TEMPERATURE: 97.3 F | HEIGHT: 62 IN | HEART RATE: 70 BPM | RESPIRATION RATE: 16 BRPM

## 2022-04-07 PROCEDURE — 36902Z: CUSTOM

## 2022-04-07 PROCEDURE — 36907Z: CUSTOM

## 2022-04-07 NOTE — ASSESSMENT
[FreeTextEntry1] : ESRD on HD with prolonged bleeding at HD for fistulogram.  COnsent obtained.\par \par Recurrent moderate stenoses in central veins and outflow PTA to 12 and 10mm, respectively, with good result and no complication.  ABL=minimal. FUll report to follow.

## 2022-04-07 NOTE — HISTORY OF PRESENT ILLNESS
[] : left radiocephalic fistula [FreeTextEntry1] : Dr. Dempsey 9/18/17 [FreeTextEntry4] : Yesterday  [FreeTextEntry5] : Yesterday 10pm  [FreeTextEntry6] : Dr. Tejeda

## 2022-04-07 NOTE — REASON FOR VISIT
[Other ___] : a [unfilled] visit for [Prolonged Bleeding] : prolonged bleeding [Family Member] : family member

## 2022-04-13 NOTE — ED PROVIDER NOTE - RESPIRATORY, MLM
POSTOPERATIVE PROGRESS NOTE    HISTORY:  Emily Horvath is a 67 year old female presenting for follow up status post right hip IM nailing with Dr. Orozco on 2022.  I am performing the postoperative management only. She is doing well and working well with physical therapy.  She uses a cane.  She does have some right knee pain as well, but nothing that requires any treatment at this time.  She feels stiff at times but is otherwise doing very well.    No weakness, numbness, or tingling, redness, warmth or drainage.  No calf pain, chest pain, or shortness of breath. Pain is well controlled.       PHYSICAL EXAM:   Musculoskeletal:  Internal rotation to 10° and external rotation to 30° without pain.  Hip flexion to 110° without pain.  mildly antalgic gait.  Significant valgus at the knee with no pain or over the medial or lateral joint line.  No effusion.    IMAGIN weight-bearing images of the right hip were reviewed and obtained the office today which demonstrate IM nail placement without any evidence of failure or loosening.  Alignment is satisfactory.    ASSESSMENT/PLAN:  Status post right hip IM nailing with Dr. Orozco on 2022.  I am providing a postoperative management only.  She will continue physical therapy as long as she is deriving benefit from it.  She will focus on her right knee and see if this is more bothersome to her in the future.  She will follow-up in 3 months with new images of her right hip and right knee at that visit if she is having pain in her knee and wishes to proceed with an injection.    I, Kristy Garcia MD,  personally performed the physical exam, obtained the history and interpreted the images independently. At the end of the visit, all questions were answered and the patient was pleased with the plan.            Breath sounds clear and equal bilaterally.

## 2022-04-15 NOTE — ED ADULT NURSE NOTE - PAIN: PRESENCE, MLM
I called pt group home to let them know [facility] cannot compound cream. I asked them to call back and let us know which compounding pharmacy they want us to send the prescription to.     Iqra LINARES  
[facility] Call Center    Phone Message    May a detailed message be left on voicemail: yes     Reason for Call: Other: [facility] cannot compound the prescription COMPOUND CONTAINING CONTROLLED SUBSTANCE (CMPD RX) 200 ml. They cannot process this. They will notify patient. Dr. Medrano will have to send to a compounding pharmacy or prescribe something else.     Action Taken: Message routed to:  [facility] ([facility] neurology    Travel Screening: Not Applicable                                                                      
denies pain/discomfort

## 2022-04-25 ENCOUNTER — APPOINTMENT (OUTPATIENT)
Dept: HEMATOLOGY ONCOLOGY | Facility: CLINIC | Age: 62
End: 2022-04-25
Payer: MEDICAID

## 2022-04-25 PROCEDURE — 99442: CPT

## 2022-04-25 NOTE — PHYSICAL EXAM
[Normal Male] : prostate smooth, symmetric with no modularity or induration [Normal] : normal spine exam without palpable tenderness, no kyphosis or scoliosis

## 2022-04-26 NOTE — HISTORY OF PRESENT ILLNESS
[Home] : at home, [unfilled] , at the time of the visit. [Medical Office: ([facility])___] : at the medical office located in  [FreeTextEntry3] : Telehealth.   [de-identified] : This is a 61 year old woman with a history of MAK 2 + polycythemia vera.   diagnosed in 2012. 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 Hollie Guzmán in [place] (# [phone]). She has had a bone marrow biopsy done at the time of diagnosis.  Was previously non complaint with therapy there, however has been quite complaint with the hydroxyurea since coming to [facility] fellows clinic here in 2017.  Patient now follows with Dr. Jacky Guo after closure of the fellows clinic.  \par \par Abdominal US done May 2017 showed: attenuated main splenic vein indicative of previous/chronic thrombosis with surrounding patent varicosities; Portal venous system is patent with hepatopedal blood flow; Patent hepatic veins.\par \par She was admitted to [facility] from July 3-5, 2018 due to hyperkalemia and thrombosis of her AVF. Hematology was consulted and recommended phlebotomy, but patient refused. She was discharged on Hydrea 500mg on HD days.  which she is still on. As of 9/27/19 she has transferred from the hematology fellows clinic to my service at [facility].  \par \par After July admission, Patient was transitioned to Jakafi 10mg, due to anemia.  Since then patient was started on danazol which succeeded in decreasing transfusion requirements.  Last transfusion took place on 12/30/22.  Patient otherwise feels well and has no complaints.  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  Feels well no complaints Hg 8.7-9.2g/dl over past few months, has not required a transfusion in about 3-4 months. No complains \par \par Patient has not experienced any more episodes of fevers chills or abdominal pain like she had prior to the Jakafi. That seems to be helping with the physical manifestations and symptoms of the Jakafi.

## 2022-04-26 NOTE — ASSESSMENT
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  She is now under my super vision for the P. Vera. \par Jakafi was initially started at 15mg TIW.  Dose was decreased to 10mg TIW due to anemia though that was likely form the lack of danazol. 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 Would continue this.  \par Dose was decreased to 10mg TIW with dialysis, but did not help with the anemia.  Patient was then started on danazol 400mg daily, then 600mg daily.  which seemed to have some improvement of her anemia to a Hg of 8.7-9.2 g/dl  on average form January into March.  Patient feeling well now and has no complaints.  \par \par \par Continue CBC Q 2 weeks along with Q 3 monthly follow up.  Continue Jakafi 10mg TIW and Danazol 600mg Daily.

## 2022-04-28 NOTE — ED ADULT NURSE NOTE - MUSCLE PAIN OR WEAKNESS
04/28/22 1746   Family/Significant Other Communication   Reason Mother called for morning update / RN reviewed plan of care with Parul/ reviewed restraints for safety   Name Alphonsa Gins   Relationship Parent   Response Receptive   Method of Communication Phone yes

## 2022-05-07 NOTE — ED PROVIDER NOTE - PMH
Abscess to back onset 7-8 years, worsening pain 1.5 wks ago. Denies fevers or chills. +discharge per pt.
Cirrhosis of liver without ascites, unspecified hepatic cirrhosis type    ESRD on peritoneal dialysis    Hypertension    Pancreatic cyst    Peptic ulcer disease    Polycythemia vera    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  Splenomegaly  2015

## 2022-05-09 ENCOUNTER — NON-APPOINTMENT (OUTPATIENT)
Age: 62
End: 2022-05-09

## 2022-05-10 ENCOUNTER — OUTPATIENT (OUTPATIENT)
Dept: OUTPATIENT SERVICES | Facility: HOSPITAL | Age: 62
LOS: 1 days | Discharge: HOME | End: 2022-05-10

## 2022-05-10 ENCOUNTER — APPOINTMENT (OUTPATIENT)
Dept: HEMATOLOGY ONCOLOGY | Facility: CLINIC | Age: 62
End: 2022-05-10

## 2022-05-10 ENCOUNTER — RESULT REVIEW (OUTPATIENT)
Age: 62
End: 2022-05-10

## 2022-05-10 DIAGNOSIS — N18.6 END STAGE RENAL DISEASE: ICD-10-CM

## 2022-05-10 DIAGNOSIS — D64.9 ANEMIA, UNSPECIFIED: ICD-10-CM

## 2022-05-10 DIAGNOSIS — D75.81 MYELOFIBROSIS: ICD-10-CM

## 2022-05-10 DIAGNOSIS — D45 POLYCYTHEMIA VERA: ICD-10-CM

## 2022-05-10 DIAGNOSIS — Z99.2 DEPENDENCE ON RENAL DIALYSIS: Chronic | ICD-10-CM

## 2022-05-10 DIAGNOSIS — T85.898A OTHER SPECIFIED COMPLICATION OF OTHER INTERNAL PROSTHETIC DEVICES, IMPLANTS AND GRAFTS, INITIAL ENCOUNTER: Chronic | ICD-10-CM

## 2022-05-10 DIAGNOSIS — I77.0 ARTERIOVENOUS FISTULA, ACQUIRED: Chronic | ICD-10-CM

## 2022-05-10 LAB
BASOPHILS # BLD AUTO: 0.17 K/UL — SIGNIFICANT CHANGE UP (ref 0–0.2)
BASOPHILS NFR BLD AUTO: 2 % — SIGNIFICANT CHANGE UP (ref 0–2)
EOSINOPHIL # BLD AUTO: 0.26 K/UL — SIGNIFICANT CHANGE UP (ref 0–0.5)
EOSINOPHIL NFR BLD AUTO: 3 % — SIGNIFICANT CHANGE UP (ref 0–6)
HCT VFR BLD CALC: 28 % — LOW (ref 34.5–45)
HGB BLD-MCNC: 8.5 G/DL — LOW (ref 11.5–15.5)
LYMPHOCYTES # BLD AUTO: 1.28 K/UL — SIGNIFICANT CHANGE UP (ref 1–3.3)
LYMPHOCYTES # BLD AUTO: 15 % — SIGNIFICANT CHANGE UP (ref 13–44)
MCHC RBC-ENTMCNC: 30.4 G/DL — LOW (ref 32–36)
MCHC RBC-ENTMCNC: 31.8 PG — SIGNIFICANT CHANGE UP (ref 27–34)
MCV RBC AUTO: 104.9 FL — HIGH (ref 80–100)
METAMYELOCYTES # FLD: 1 % — HIGH (ref 0–0)
MONOCYTES # BLD AUTO: 0.68 K/UL — SIGNIFICANT CHANGE UP (ref 0–0.9)
MONOCYTES NFR BLD AUTO: 8 % — SIGNIFICANT CHANGE UP (ref 2–14)
MYELOCYTES NFR BLD: 4 % — HIGH (ref 0–0)
NEUTROPHILS # BLD AUTO: 5.73 K/UL — SIGNIFICANT CHANGE UP (ref 1.8–7.4)
NEUTROPHILS NFR BLD AUTO: 67 % — SIGNIFICANT CHANGE UP (ref 43–77)
NRBC # BLD: 1 /100 — HIGH (ref 0–0)
NRBC # BLD: SIGNIFICANT CHANGE UP /100 WBCS (ref 0–0)
PLAT MORPH BLD: NORMAL — SIGNIFICANT CHANGE UP
PLATELET # BLD AUTO: 193 K/UL — SIGNIFICANT CHANGE UP (ref 150–400)
RBC # BLD: 2.67 M/UL — LOW (ref 3.8–5.2)
RBC # FLD: 18.3 % — HIGH (ref 10.3–14.5)
RBC BLD AUTO: SIGNIFICANT CHANGE UP
RETICS #: 105.7 K/UL — SIGNIFICANT CHANGE UP (ref 25–125)
RETICS/RBC NFR: 4.1 % — HIGH (ref 0.5–2.5)
WBC # BLD: 8.55 K/UL — SIGNIFICANT CHANGE UP (ref 3.8–10.5)
WBC # FLD AUTO: 8.55 K/UL — SIGNIFICANT CHANGE UP (ref 3.8–10.5)

## 2022-05-16 LAB — FERRITIN SERPL-MCNC: 1935 NG/ML

## 2022-05-16 NOTE — ED ADULT TRIAGE NOTE - WEIGHT IN LBS
I reviewed the progress note and agree with the resident’s findings and plan as documented in current encounter.    Melanie Bauman MD       130

## 2022-05-17 ENCOUNTER — RESULT REVIEW (OUTPATIENT)
Age: 62
End: 2022-05-17

## 2022-05-17 ENCOUNTER — APPOINTMENT (OUTPATIENT)
Dept: INFUSION THERAPY | Facility: HOSPITAL | Age: 62
End: 2022-05-17

## 2022-05-17 LAB
BASOPHILS # BLD AUTO: 0.16 K/UL — SIGNIFICANT CHANGE UP (ref 0–0.2)
BASOPHILS NFR BLD AUTO: 2 % — SIGNIFICANT CHANGE UP (ref 0–2)
EOSINOPHIL # BLD AUTO: 0.24 K/UL — SIGNIFICANT CHANGE UP (ref 0–0.5)
EOSINOPHIL NFR BLD AUTO: 3 % — SIGNIFICANT CHANGE UP (ref 0–6)
HCT VFR BLD CALC: 26.7 % — LOW (ref 34.5–45)
HGB BLD-MCNC: 8.5 G/DL — LOW (ref 11.5–15.5)
LYMPHOCYTES # BLD AUTO: 1.91 K/UL — SIGNIFICANT CHANGE UP (ref 1–3.3)
LYMPHOCYTES # BLD AUTO: 24 % — SIGNIFICANT CHANGE UP (ref 13–44)
MCHC RBC-ENTMCNC: 31.8 G/DL — LOW (ref 32–36)
MCHC RBC-ENTMCNC: 32.3 PG — SIGNIFICANT CHANGE UP (ref 27–34)
MCV RBC AUTO: 101.5 FL — HIGH (ref 80–100)
METAMYELOCYTES # FLD: 2 % — HIGH (ref 0–0)
MONOCYTES # BLD AUTO: 0.32 K/UL — SIGNIFICANT CHANGE UP (ref 0–0.9)
MONOCYTES NFR BLD AUTO: 4 % — SIGNIFICANT CHANGE UP (ref 2–14)
MYELOCYTES NFR BLD: 1 % — HIGH (ref 0–0)
NEUTROPHILS # BLD AUTO: 5.09 K/UL — SIGNIFICANT CHANGE UP (ref 1.8–7.4)
NEUTROPHILS NFR BLD AUTO: 64 % — SIGNIFICANT CHANGE UP (ref 43–77)
NRBC # BLD: 1 /100 — HIGH (ref 0–0)
NRBC # BLD: SIGNIFICANT CHANGE UP /100 WBCS (ref 0–0)
PLAT MORPH BLD: NORMAL — SIGNIFICANT CHANGE UP
PLATELET # BLD AUTO: 173 K/UL — SIGNIFICANT CHANGE UP (ref 150–400)
RBC # BLD: 2.63 M/UL — LOW (ref 3.8–5.2)
RBC # FLD: 18.6 % — HIGH (ref 10.3–14.5)
RBC BLD AUTO: SIGNIFICANT CHANGE UP
WBC # BLD: 7.95 K/UL — SIGNIFICANT CHANGE UP (ref 3.8–10.5)
WBC # FLD AUTO: 7.95 K/UL — SIGNIFICANT CHANGE UP (ref 3.8–10.5)

## 2022-06-01 ENCOUNTER — INPATIENT (INPATIENT)
Facility: HOSPITAL | Age: 62
LOS: 0 days | Discharge: HOME | DRG: 682 | End: 2022-06-02
Attending: STUDENT IN AN ORGANIZED HEALTH CARE EDUCATION/TRAINING PROGRAM | Admitting: INTERNAL MEDICINE
Payer: MEDICAID

## 2022-06-01 ENCOUNTER — NON-APPOINTMENT (OUTPATIENT)
Age: 62
End: 2022-06-01

## 2022-06-01 VITALS
TEMPERATURE: 98 F | HEIGHT: 63 IN | OXYGEN SATURATION: 100 % | RESPIRATION RATE: 18 BRPM | HEART RATE: 84 BPM | DIASTOLIC BLOOD PRESSURE: 82 MMHG | WEIGHT: 139.99 LBS | SYSTOLIC BLOOD PRESSURE: 134 MMHG

## 2022-06-01 DIAGNOSIS — T85.898A OTHER SPECIFIED COMPLICATION OF OTHER INTERNAL PROSTHETIC DEVICES, IMPLANTS AND GRAFTS, INITIAL ENCOUNTER: Chronic | ICD-10-CM

## 2022-06-01 DIAGNOSIS — N18.9 CHRONIC KIDNEY DISEASE, UNSPECIFIED: ICD-10-CM

## 2022-06-01 DIAGNOSIS — Z99.2 DEPENDENCE ON RENAL DIALYSIS: Chronic | ICD-10-CM

## 2022-06-01 DIAGNOSIS — N18.6 END STAGE RENAL DISEASE: ICD-10-CM

## 2022-06-01 DIAGNOSIS — I77.0 ARTERIOVENOUS FISTULA, ACQUIRED: Chronic | ICD-10-CM

## 2022-06-01 LAB
ALBUMIN SERPL ELPH-MCNC: 4.1 G/DL — SIGNIFICANT CHANGE UP (ref 3.3–5)
ALP SERPL-CCNC: 94 U/L — SIGNIFICANT CHANGE UP (ref 40–120)
ALT FLD-CCNC: 13 U/L — SIGNIFICANT CHANGE UP (ref 10–45)
ANION GAP SERPL CALC-SCNC: 15 MMOL/L — SIGNIFICANT CHANGE UP (ref 5–17)
ANISOCYTOSIS BLD QL: SIGNIFICANT CHANGE UP
APTT BLD: 71 SEC — HIGH (ref 27.5–35.5)
AST SERPL-CCNC: 18 U/L — SIGNIFICANT CHANGE UP (ref 10–40)
BASOPHILS # BLD AUTO: 0.12 K/UL — SIGNIFICANT CHANGE UP (ref 0–0.2)
BASOPHILS NFR BLD AUTO: 2 % — SIGNIFICANT CHANGE UP (ref 0–2)
BILIRUB SERPL-MCNC: 2.5 MG/DL — HIGH (ref 0.2–1.2)
BLD GP AB SCN SERPL QL: NEGATIVE — SIGNIFICANT CHANGE UP
BUN SERPL-MCNC: 38 MG/DL — HIGH (ref 7–23)
CALCIUM SERPL-MCNC: 8.5 MG/DL — SIGNIFICANT CHANGE UP (ref 8.4–10.5)
CHLORIDE SERPL-SCNC: 100 MMOL/L — SIGNIFICANT CHANGE UP (ref 96–108)
CO2 SERPL-SCNC: 22 MMOL/L — SIGNIFICANT CHANGE UP (ref 22–31)
CREAT SERPL-MCNC: 7.26 MG/DL — HIGH (ref 0.5–1.3)
DACRYOCYTES BLD QL SMEAR: SLIGHT — SIGNIFICANT CHANGE UP
EGFR: 6 ML/MIN/1.73M2 — LOW
ELLIPTOCYTES BLD QL SMEAR: SLIGHT — SIGNIFICANT CHANGE UP
EOSINOPHIL # BLD AUTO: 0 K/UL — SIGNIFICANT CHANGE UP (ref 0–0.5)
EOSINOPHIL NFR BLD AUTO: 0 % — SIGNIFICANT CHANGE UP (ref 0–6)
GLUCOSE SERPL-MCNC: 102 MG/DL — HIGH (ref 70–99)
HCT VFR BLD CALC: 21 % — CRITICAL LOW (ref 34.5–45)
HGB BLD-MCNC: 6.4 G/DL — CRITICAL LOW (ref 11.5–15.5)
INR BLD: 1.14 RATIO — SIGNIFICANT CHANGE UP (ref 0.88–1.16)
LYMPHOCYTES # BLD AUTO: 0.49 K/UL — LOW (ref 1–3.3)
LYMPHOCYTES # BLD AUTO: 8 % — LOW (ref 13–44)
MACROCYTES BLD QL: SIGNIFICANT CHANGE UP
MANUAL SMEAR VERIFICATION: SIGNIFICANT CHANGE UP
MCHC RBC-ENTMCNC: 30.5 GM/DL — LOW (ref 32–36)
MCHC RBC-ENTMCNC: 31.8 PG — SIGNIFICANT CHANGE UP (ref 27–34)
MCV RBC AUTO: 104.5 FL — HIGH (ref 80–100)
METAMYELOCYTES # FLD: 3 % — HIGH (ref 0–0)
MICROCYTES BLD QL: SLIGHT — SIGNIFICANT CHANGE UP
MONOCYTES # BLD AUTO: 0.25 K/UL — SIGNIFICANT CHANGE UP (ref 0–0.9)
MONOCYTES NFR BLD AUTO: 4 % — SIGNIFICANT CHANGE UP (ref 2–14)
MYELOCYTES NFR BLD: 2 % — HIGH (ref 0–0)
NEUTROPHILS # BLD AUTO: 4.81 K/UL — SIGNIFICANT CHANGE UP (ref 1.8–7.4)
NEUTROPHILS NFR BLD AUTO: 74 % — SIGNIFICANT CHANGE UP (ref 43–77)
NEUTS BAND # BLD: 4 % — SIGNIFICANT CHANGE UP (ref 0–8)
NRBC # BLD: 3 /100 — HIGH (ref 0–0)
PLAT MORPH BLD: NORMAL — SIGNIFICANT CHANGE UP
PLATELET # BLD AUTO: 119 K/UL — LOW (ref 150–400)
POLYCHROMASIA BLD QL SMEAR: SLIGHT — SIGNIFICANT CHANGE UP
POTASSIUM SERPL-MCNC: 5 MMOL/L — SIGNIFICANT CHANGE UP (ref 3.5–5.3)
POTASSIUM SERPL-SCNC: 5 MMOL/L — SIGNIFICANT CHANGE UP (ref 3.5–5.3)
PROMYELOCYTES # FLD: 1 % — HIGH (ref 0–0)
PROT SERPL-MCNC: 7.3 G/DL — SIGNIFICANT CHANGE UP (ref 6–8.3)
PROTHROM AB SERPL-ACNC: 13.2 SEC — SIGNIFICANT CHANGE UP (ref 10.5–13.4)
RBC # BLD: 2.01 M/UL — LOW (ref 3.8–5.2)
RBC # FLD: 19.6 % — HIGH (ref 10.3–14.5)
RBC BLD AUTO: ABNORMAL
RH IG SCN BLD-IMP: POSITIVE — SIGNIFICANT CHANGE UP
SARS-COV-2 RNA SPEC QL NAA+PROBE: SIGNIFICANT CHANGE UP
SMUDGE CELLS # BLD: PRESENT — SIGNIFICANT CHANGE UP
SODIUM SERPL-SCNC: 137 MMOL/L — SIGNIFICANT CHANGE UP (ref 135–145)
VARIANT LYMPHS # BLD: 2 % — SIGNIFICANT CHANGE UP (ref 0–6)
WBC # BLD: 6.17 K/UL — SIGNIFICANT CHANGE UP (ref 3.8–10.5)
WBC # FLD AUTO: 6.17 K/UL — SIGNIFICANT CHANGE UP (ref 3.8–10.5)

## 2022-06-01 PROCEDURE — 93010 ELECTROCARDIOGRAM REPORT: CPT

## 2022-06-01 PROCEDURE — 99285 EMERGENCY DEPT VISIT HI MDM: CPT | Mod: 25

## 2022-06-01 PROCEDURE — 99254 IP/OBS CNSLTJ NEW/EST MOD 60: CPT | Mod: GC

## 2022-06-01 PROCEDURE — 71045 X-RAY EXAM CHEST 1 VIEW: CPT | Mod: 26

## 2022-06-01 NOTE — ED ADULT NURSE NOTE - OBJECTIVE STATEMENT
Pt is a 61 Y A&O x3 F with hx of HTN, polycythemia vera, ESRD on HD via L AV fistula MWF (last dialysis Monday) presenting to ED with c/o low hemoglobin. Pt reports she had her blood work drawn on Monday, was called today and told to come to ED for low hemoglobin and for a blood transfusion. Pt endorses mild SOB and fatigue. Pt denies chest pain. Pt arrives to ED breathing unlabored on RA. Pt speaking in complete sentences. Skin is warm and dry. No diaphoresis noted. Pt ambulatory with steady gait. Safety and comfort maintained.

## 2022-06-01 NOTE — CONSULT NOTE ADULT - PROBLEM SELECTOR RECOMMENDATION 9
Pt. with ESRD on HD three times a week (MWF) presented to [facility] for Anemia Last HD was on 5/30 via LUE AVF Pt. clinically stable. No CP or palpitations during evaluation. Slight SOB, not supplemental O2 required. HD consent obtained from pt, placed in chart. Labs reviewed. Will arrange for HD and transfusion with HD. Pt. with ESRD on HD three times a week (MWF) presented to [facility] for Anemia Last HD was on 5/30 via LUE AVF Pt. clinically stable. No CP or palpitations during evaluation. Slight SOB, not supplemental O2 required. HD consent obtained from pt, placed in chart. Labs reviewed. Will arrange for HD. Please order Type and Screen and arrange for transfusion with HD.

## 2022-06-01 NOTE — ED PROVIDER NOTE - ATTENDING APP SHARED VISIT CONTRIBUTION OF CARE
Private Physician Brant nephro/Admts to Fely  61y female pmh Cirrhosos, Myelofibrosis, HTn, P. Vera, CKD on HD,anemia Comes to ed c/o symptomatic anemia. Pt was supposed to be dialized today but was told that she was too anemic, referred to ed. Has c/o mild white/fatigue. PE Heent normocephalic atraumatic neck supple chest clear anterior & posterior cv no rubs, gallops or murmurs abd soft +bs no  mass guarding neruo gcs 15 speech fluent moves all extr  MSK lue av fistula  Deangelo Hart MD, Facep

## 2022-06-01 NOTE — CONSULT NOTE ADULT - SUBJECTIVE AND OBJECTIVE BOX
[facility] DIVISION OF KIDNEY DISEASES AND HYPERTENSION -- [phone]  -- INITIAL CONSULT NOTE  --------------------------------------------------------------------------------  HPI:    Patient is a 61 y.o. 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 Pt. follows with Dr. De León as outpatient at [facility]. Pt. advised to go to the ER today without as HgB noted to be 6.6. Pt. last received HD 5/30. Patient notes general fatigue for the past few days with dyspnea. Fatigue worsens with activity. NO abdominal pain, no red blood per rectum or melena.  NO epistaxis.  NO vaginal bleeding.  Ho hematuria.  NO jaundice. Patient with multiple antibodies from prior transfusions. Pt. had port placed on 11/2/22 so that Pt. can receive blood transfusions at [place]. Pt. states she last received a blood transfusion several months ago.     Nephrology consulted for ESRD management. Pt. follows with Dr. De León at [facility]. Pt. to receive pRBC with dialysis today. Pt. currently denies and chest pain, shortness of breath, lightheadedness or dizziness.      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      Cirrhosis of liver without ascites, unspecified hepatic cirrhosis type      Pancreatic cyst      History of myelofibrosis      Peritoneal dialysis catheter in place  Placed 8/9/2017      Hemoperitoneum as complication of peritoneal dialysis  s/p removal 9/18      AV fistula  Placed 9/18        FAMILY HISTORY:  Family history of hypertension in mother    Family history of malignant neoplasm (Grandparent)  head and neck in father    FH: cirrhosis (Sibling)      PAST SOCIAL HISTORY:    ALLERGIES & MEDICATIONS  --------------------------------------------------------------------------------  Allergies    No Known Allergies    Intolerances      Standing Inpatient Medications    PRN Inpatient Medications      REVIEW OF SYSTEMS  --------------------------------------------------------------------------------  Gen: Increased fatigue   Skin: No rashes  Head/Eyes/Ears: Normal hearing,   Respiratory: Dyspnea   CV: No chest pain  GI: No abdominal pain, diarrhea  : No dysuria, hematuria  MSK: No LE edema  Heme: No easy bruising or bleeding  Psych: No significant depression    All other systems were reviewed and are negative, except as noted.    VITALS/PHYSICAL EXAM  --------------------------------------------------------------------------------  T(C): 36.7 (06-01-22 @ 15:34), Max: 36.7 (06-01-22 @ 15:34)  HR: 84 (06-01-22 @ 15:34) (84 - 84)  BP: 134/82 (06-01-22 @ 15:34) (134/82 - 134/82)  RR: 18 (06-01-22 @ 15:34) (18 - 18)  SpO2: 100% (06-01-22 @ 15:34) (100% - 100%)  Wt(kg): --  Height (cm): 160 (06-01-22 @ 15:34)  Weight (kg): 63.5 (06-01-22 @ 15:34)  BMI (kg/m2): 24.8 (06-01-22 @ 15:34)  BSA (m2): 1.66 (06-01-22 @ 15:34)      Physical Exam:  	Gen: NAD  	HEENT: MMM  	Pulm: CTA B/L  	CV: S1S2  	Abd: Soft, +BS   	Ext: No LE edema B/L  	Neuro: Awake  	Skin: Warm and dry  	Vascular access:    LABS/STUDIES  --------------------------------------------------------------------------------              6.4    6.17  >-----------<  119      [06-01-22 @ 19:13]              21.0     137  |  100  |  38  ----------------------------<  102      [06-01-22 @ 19:13]  5.0   |  22  |  7.26        Ca     8.5     [06-01-22 @ 19:13]    TPro  7.3  /  Alb  4.1  /  TBili  2.5  /  DBili  x   /  AST  18  /  ALT  13  /  AlkPhos  94  [06-01-22 @ 19:13]    PT/INR: PT 13.2 , INR 1.14       [06-01-22 @ 19:13]  PTT: 71.0       [06-01-22 @ 19:13]      Creatinine Trend:  SCr 7.26 [06-01 @ 19:13]    Urinalysis - [01-09-21 @ 12:32]      Color Yellow / Appearance Slightly Turbid / SG 1.021 / pH 8.0      Gluc Trace / Ketone Trace  / Bili Negative / Urobili Negative       Blood Trace / Protein 300 mg/dL / Leuk Est Large / Nitrite Negative      RBC 1 /  / Hyaline 8 / Gran  / Sq Epi  / Non Sq Epi 18 / Bacteria Many      Iron 36, TIBC 152, %sat 23      [08-11-21 @ 09:35]  Ferritin 998      [08-11-21 @ 09:40]  HbA1c 4.9      [06-01-19 @ 00:46]  TSH 1.78      [09-08-21 @ 09:18]    HBsAb 9.5      [04-10-21 @ 23:35]  HBsAb Reactive      [07-29-21 @ 00:09]  HBsAg Nonreact      [07-29-21 @ 00:09]  HBcAb Nonreact      [07-29-21 @ 00:09]  HCV 0.19, Nonreact      [07-29-21 @ 00:09]  HIV Nonreact      [04-11-21 @ 08:31]    RAHUL: titer 1:80, pattern Homogeneous      [05-16-18 @ 22:43]  Syphilis Screen (Treponema Pallidum Ab) Negative      [09-08-21 @ 09:08]   [facility] DIVISION OF KIDNEY DISEASES AND HYPERTENSION -- [phone]  -- INITIAL CONSULT NOTE  --------------------------------------------------------------------------------  HPI:    Patient is a 61 y.o. 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 Pt. follows with Dr. De León as outpatient at [facility]. Pt. advised to go to the ER today as HgB noted to be 6.6. In the ED HgB noted to be 6.4. Pt. last received HD 5/30. Patient notes general fatigue for the past few days with dyspnea. Fatigue worsens with activity. NO abdominal pain, no red blood per rectum or melena.  NO epistaxis.  NO vaginal bleeding.  Ho hematuria.  NO jaundice. Patient with multiple antibodies from prior transfusions. Pt. had port placed on 11/2/22 so that Pt. can receive blood transfusions at [place]. Pt. states she last received a blood transfusion several months ago.     Nephrology consulted for ESRD management. Pt. follows with Dr. De León at [facility]. Pt. to receive pRBC with dialysis today. Pt. currently endorses dyspnea on exertion and slight dyspnea at rest as well as increased fatigue.     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      Cirrhosis of liver without ascites, unspecified hepatic cirrhosis type      Pancreatic cyst      History of myelofibrosis      Peritoneal dialysis catheter in place  Placed 8/9/2017      Hemoperitoneum as complication of peritoneal dialysis  s/p removal 9/18      AV fistula  Placed 9/18        FAMILY HISTORY:  Family history of hypertension in mother    Family history of malignant neoplasm (Grandparent)  head and neck in father    FH: cirrhosis (Sibling)      PAST SOCIAL HISTORY:    ALLERGIES & MEDICATIONS  --------------------------------------------------------------------------------  Allergies    No Known Allergies    Intolerances      Standing Inpatient Medications    PRN Inpatient Medications      REVIEW OF SYSTEMS  --------------------------------------------------------------------------------  Gen: Increased fatigue   Skin: No rashes  Head/Eyes/Ears: Normal hearing,   Respiratory: Dyspnea   CV: No chest pain  GI: No abdominal pain, diarrhea  : No dysuria, hematuria  MSK: No LE edema  Heme: No easy bruising or bleeding  Psych: No significant depression    All other systems were reviewed and are negative, except as noted.    VITALS/PHYSICAL EXAM  --------------------------------------------------------------------------------  T(C): 36.7 (06-01-22 @ 15:34), Max: 36.7 (06-01-22 @ 15:34)  HR: 84 (06-01-22 @ 15:34) (84 - 84)  BP: 134/82 (06-01-22 @ 15:34) (134/82 - 134/82)  RR: 18 (06-01-22 @ 15:34) (18 - 18)  SpO2: 100% (06-01-22 @ 15:34) (100% - 100%)  Wt(kg): --  Height (cm): 160 (06-01-22 @ 15:34)  Weight (kg): 63.5 (06-01-22 @ 15:34)  BMI (kg/m2): 24.8 (06-01-22 @ 15:34)  BSA (m2): 1.66 (06-01-22 @ 15:34)      Physical Exam:  	Gen: NAD  	HEENT: MMM  	Pulm: CTA B/L  	CV: S1S2  	Abd: Soft, +BS   	Ext: No LE edema B/L  	Neuro: Awake  	Skin: Warm and dry  	Vascular access:    LABS/STUDIES  --------------------------------------------------------------------------------              6.4    6.17  >-----------<  119      [06-01-22 @ 19:13]              21.0     137  |  100  |  38  ----------------------------<  102      [06-01-22 @ 19:13]  5.0   |  22  |  7.26        Ca     8.5     [06-01-22 @ 19:13]    TPro  7.3  /  Alb  4.1  /  TBili  2.5  /  DBili  x   /  AST  18  /  ALT  13  /  AlkPhos  94  [06-01-22 @ 19:13]    PT/INR: PT 13.2 , INR 1.14       [06-01-22 @ 19:13]  PTT: 71.0       [06-01-22 @ 19:13]      Creatinine Trend:  SCr 7.26 [06-01 @ 19:13]    Urinalysis - [01-09-21 @ 12:32]      Color Yellow / Appearance Slightly Turbid / SG 1.021 / pH 8.0      Gluc Trace / Ketone Trace  / Bili Negative / Urobili Negative       Blood Trace / Protein 300 mg/dL / Leuk Est Large / Nitrite Negative      RBC 1 /  / Hyaline 8 / Gran  / Sq Epi  / Non Sq Epi 18 / Bacteria Many      Iron 36, TIBC 152, %sat 23      [08-11-21 @ 09:35]  Ferritin 998      [08-11-21 @ 09:40]  HbA1c 4.9      [06-01-19 @ 00:46]  TSH 1.78      [09-08-21 @ 09:18]    HBsAb 9.5      [04-10-21 @ 23:35]  HBsAb Reactive      [07-29-21 @ 00:09]  HBsAg Nonreact      [07-29-21 @ 00:09]  HBcAb Nonreact      [07-29-21 @ 00:09]  HCV 0.19, Nonreact      [07-29-21 @ 00:09]  HIV Nonreact      [04-11-21 @ 08:31]    RAHUL: titer 1:80, pattern Homogeneous      [05-16-18 @ 22:43]  Syphilis Screen (Treponema Pallidum Ab) Negative      [09-08-21 @ 09:08]

## 2022-06-01 NOTE — DISCUSSION/SUMMARY
[FreeTextEntry1] : Pt currently admitted at [facility] under Dr. Geiger's service for anemia to 6.4.

## 2022-06-01 NOTE — ED PROVIDER NOTE - NS ED ATTENDING STATEMENT MOD
This was a shared visit with the KARTHIK. I reviewed and verified the documentation and independently performed the documented:

## 2022-06-01 NOTE — ED PROVIDER NOTE - OBJECTIVE STATEMENT
61-year-old female with medical history of  polycythemia vera , ESRD on HD MWF via LUE AVF, and HTN who presents with anemia outpatient labs. As per patient, last dialysis was monday. Today when she went she was told she was anemic and to come to the ER to get transfused and dialysis. Patients last transfusion was in Jan. Patient admits to slight sob and fatigue. Patient denies chest pain, cough, fever, abd pain, nvd, melena, brbpr, and vaginal bleeding.

## 2022-06-01 NOTE — ED ADULT NURSE REASSESSMENT NOTE - NS ED NURSE REASSESS COMMENT FT1
Mediport Gauge 20 placed to right chest wall, with sterile technique, blood obtained. Pt tolerated procedure well.

## 2022-06-01 NOTE — ED ADULT NURSE REASSESSMENT NOTE - NS ED NURSE REASSESS COMMENT FT1
Received report from DARREN Remy. Pt is awake and alert, resting comfortably in stretcher, speaking in full coherent sentences. Pt denies CP, SOB, fevers, chills, N/V/D, HA, vision changes. Call bell within reach, comfort & safety provided.

## 2022-06-01 NOTE — CONSULT NOTE ADULT - PROBLEM SELECTOR RECOMMENDATION 2
Anemia likely secondary to PCV and progression to myelofibrosis. C/w Jakafi and Danozol. Transfusion as per primary team, Pt. requesting to have transfusiton with HD Anemia likely secondary to PCV and progression to myelofibrosis. C/w Jakafi and Mohan. Transfusion as per primary team, Pt. requesting to have transfusion with HD    If you have any questions, please feel free to contact me  Kota Ramey  Nephrology Fellow  [phone]; Prefer [employer] TEAMS  (After 5pm or on weekends please page the on-call fellow)

## 2022-06-01 NOTE — ED PROVIDER NOTE - CLINICAL SUMMARY MEDICAL DECISION MAKING FREE TEXT BOX
61-year-old female with medical history of  polycythemia vera , ESRD on HD MWF via LUE AVF, and HTN who presents with anemia outpatient labs. Consent obtained. will admit for dialysis and transfusion

## 2022-06-02 ENCOUNTER — TRANSCRIPTION ENCOUNTER (OUTPATIENT)
Age: 62
End: 2022-06-02

## 2022-06-02 ENCOUNTER — NON-APPOINTMENT (OUTPATIENT)
Age: 62
End: 2022-06-02

## 2022-06-02 VITALS
RESPIRATION RATE: 18 BRPM | TEMPERATURE: 98 F | OXYGEN SATURATION: 98 % | SYSTOLIC BLOOD PRESSURE: 115 MMHG | HEART RATE: 82 BPM | DIASTOLIC BLOOD PRESSURE: 75 MMHG

## 2022-06-02 DIAGNOSIS — Z02.9 ENCOUNTER FOR ADMINISTRATIVE EXAMINATIONS, UNSPECIFIED: ICD-10-CM

## 2022-06-02 DIAGNOSIS — Z29.9 ENCOUNTER FOR PROPHYLACTIC MEASURES, UNSPECIFIED: ICD-10-CM

## 2022-06-02 DIAGNOSIS — D45 POLYCYTHEMIA VERA: ICD-10-CM

## 2022-06-02 DIAGNOSIS — D64.9 ANEMIA, UNSPECIFIED: ICD-10-CM

## 2022-06-02 LAB
APTT BLD: 43.7 SEC — HIGH (ref 27.5–35.5)
HCT VFR BLD CALC: 26.4 % — LOW (ref 34.5–45)
HGB BLD-MCNC: 8.2 G/DL — LOW (ref 11.5–15.5)
INR BLD: 1.15 RATIO — SIGNIFICANT CHANGE UP (ref 0.88–1.16)
MCHC RBC-ENTMCNC: 29.6 PG — SIGNIFICANT CHANGE UP (ref 27–34)
MCHC RBC-ENTMCNC: 31.1 GM/DL — LOW (ref 32–36)
MCV RBC AUTO: 95.3 FL — SIGNIFICANT CHANGE UP (ref 80–100)
NRBC # BLD: 2 /100 WBCS — HIGH (ref 0–0)
PLATELET # BLD AUTO: 107 K/UL — LOW (ref 150–400)
PROTHROM AB SERPL-ACNC: 13.4 SEC — SIGNIFICANT CHANGE UP (ref 10.5–13.4)
RBC # BLD: 2.77 M/UL — LOW (ref 3.8–5.2)
RBC # FLD: 25 % — HIGH (ref 10.3–14.5)
SARS-COV-2 RNA SPEC QL NAA+PROBE: SIGNIFICANT CHANGE UP
WBC # BLD: 7.23 K/UL — SIGNIFICANT CHANGE UP (ref 3.8–10.5)
WBC # FLD AUTO: 7.23 K/UL — SIGNIFICANT CHANGE UP (ref 3.8–10.5)

## 2022-06-02 PROCEDURE — 86922 COMPATIBILITY TEST ANTIGLOB: CPT

## 2022-06-02 PROCEDURE — P9040: CPT

## 2022-06-02 PROCEDURE — 99232 SBSQ HOSP IP/OBS MODERATE 35: CPT | Mod: GC

## 2022-06-02 PROCEDURE — 99261: CPT

## 2022-06-02 PROCEDURE — U0003: CPT

## 2022-06-02 PROCEDURE — 99239 HOSP IP/OBS DSCHRG MGMT >30: CPT

## 2022-06-02 PROCEDURE — 99285 EMERGENCY DEPT VISIT HI MDM: CPT

## 2022-06-02 PROCEDURE — 36430 TRANSFUSION BLD/BLD COMPNT: CPT

## 2022-06-02 PROCEDURE — 99223 1ST HOSP IP/OBS HIGH 75: CPT

## 2022-06-02 PROCEDURE — 86901 BLOOD TYPING SEROLOGIC RH(D): CPT

## 2022-06-02 PROCEDURE — 71045 X-RAY EXAM CHEST 1 VIEW: CPT

## 2022-06-02 PROCEDURE — 86900 BLOOD TYPING SEROLOGIC ABO: CPT

## 2022-06-02 PROCEDURE — U0005: CPT

## 2022-06-02 PROCEDURE — 85025 COMPLETE CBC W/AUTO DIFF WBC: CPT

## 2022-06-02 PROCEDURE — 86850 RBC ANTIBODY SCREEN: CPT

## 2022-06-02 PROCEDURE — 86902 BLOOD TYPE ANTIGEN DONOR EA: CPT

## 2022-06-02 PROCEDURE — 80053 COMPREHEN METABOLIC PANEL: CPT

## 2022-06-02 PROCEDURE — 85730 THROMBOPLASTIN TIME PARTIAL: CPT

## 2022-06-02 PROCEDURE — 85610 PROTHROMBIN TIME: CPT

## 2022-06-02 RX ORDER — CHLORHEXIDINE GLUCONATE 213 G/1000ML
1 SOLUTION TOPICAL
Refills: 0 | Status: DISCONTINUED | OUTPATIENT
Start: 2022-06-02 | End: 2022-06-02

## 2022-06-02 RX ORDER — CALCIUM ACETATE 667 MG
1334 TABLET ORAL
Refills: 0 | Status: DISCONTINUED | OUTPATIENT
Start: 2022-06-02 | End: 2022-06-02

## 2022-06-02 RX ORDER — DANAZOL 200 MG/1
200 CAPSULE ORAL THREE TIMES A DAY
Refills: 0 | Status: DISCONTINUED | OUTPATIENT
Start: 2022-06-02 | End: 2022-06-02

## 2022-06-02 RX ADMIN — Medication 1 TABLET(S): at 11:03

## 2022-06-02 RX ADMIN — DANAZOL 200 MILLIGRAM(S): 200 CAPSULE ORAL at 05:14

## 2022-06-02 RX ADMIN — Medication 1334 MILLIGRAM(S): at 14:14

## 2022-06-02 RX ADMIN — DANAZOL 200 MILLIGRAM(S): 200 CAPSULE ORAL at 14:14

## 2022-06-02 RX ADMIN — Medication 1334 MILLIGRAM(S): at 17:01

## 2022-06-02 RX ADMIN — Medication 1334 MILLIGRAM(S): at 09:47

## 2022-06-02 NOTE — DISCHARGE NOTE PROVIDER - NSDCCPCAREPLAN_GEN_ALL_CORE_FT
PRINCIPAL DISCHARGE DIAGNOSIS  Diagnosis: Anemia  Assessment and Plan of Treatment: You were sent to the hospital because your blood count, or hemoglobin, was too low. This can be due to chronic kidney disease. You received 1 unit of blood to correct this. Please continue to see your nephrologist after discharge to further manage this.  If you notice any bleeding or dark/bloody stools, please return to the hospital.      SECONDARY DISCHARGE DIAGNOSES  Diagnosis: ESRD on hemodialysis  Assessment and Plan of Treatment: You received 1 round of dialysis during your admission. You should continue to get dialysis regularly. Please see your nephrologist within 2 weeks.    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 Please follow up with your hematologist/oncologist within 2 weeks to continue management for your myelofibrosis.

## 2022-06-02 NOTE — H&P ADULT - PROBLEM SELECTOR PLAN 5
Transitions of Care Status:  1.  Name of PCP:   Jacky Guo MD (hematology) [phone]  2.  PCP Contacted on Admission: [ ] Y    [ ] N    3.  PCP contacted at Discharge: [ ] Y    [ ] N    [ ] N/A  4.  Post-Discharge Appointment Date and Location:  5.  Summary of Handoff given to PCP:

## 2022-06-02 NOTE — PROGRESS NOTE ADULT - PROBLEM SELECTOR PLAN 3
See above.  ON Jakafi and danazol--would consider formal evaluation by patient's haematologist in the AM. On Jakafi and danazol; hematology contacted, pt can f/u outpt

## 2022-06-02 NOTE — H&P ADULT - NSHPLABSRESULTS_GEN_ALL_CORE
EKG tracing reviewed with NSR @ 76.    WBC 6.1  with 74N, 4 BANDS, 3 meta, 2 myelo    Hgb 6.4  (last 8.5 Hgb)    Platelets of 119    INR 1.1  aPTT 71.    Random glucose of 102.  Cr 7.2 pre HD  K+ 5.0 pre HD.    COVID-19 PCR>>negative x 2 in the ER.    Chest radiograph reviewed with no infiltrate or effusion.   + Port.

## 2022-06-02 NOTE — PROGRESS NOTE ADULT - ATTENDING COMMENTS
ok to dc home with outpt hd tomorrow at [facility]
Agree with above with the following additions/corrections:    61F w Myelofibrosis (Jak2+ PV that transformed to MF, on Jakafi), ESRD on HD M/W/F, non cirrhotic portal HTN with gastric varices, past COVID-19 infection in April 2021,  MF c/b massive splenomegaly and history of splenic vein thrombosis (2015), who was sent to ED bc outpt labs w Hb 6.6, admitted for pRBC transfusion, s/p 1u pRBC w HD.     Given 1U pRBC with Hgb improved to 8.2.  No evidence of bleeding  Spoken to heme patient can follow up as an outpatient no further need for transfusions  Received HD earlier this morning as per renal no contraindications to discharge    d/c planning 32 minutes      Case d/w Dr. Larson and Dr. Awad

## 2022-06-02 NOTE — DISCHARGE NOTE NURSING/CASE MANAGEMENT/SOCIAL WORK - NSDCPEFALRISK_GEN_ALL_CORE
For information on Fall & Injury Prevention, visit: [email or web address] OR  [email or web address] OR  https://www.cdc.gov/steadi/patient.html

## 2022-06-02 NOTE — H&P ADULT - NSHPADDITIONALINFOADULT_GEN_ALL_CORE
NIGHT HOSPITALIST:   Patient aware of course and agrees with plan/care as above.  Given patient's comorbidities, patient's long term prognosis is guarded.   Emotional support provided to patient.  Care reviewed with covering NP/PA for endorsement to Dr. Geiger.    Jhoan Owens MD  Available on [employer] Teams. NIGHT HOSPITALIST:   Patient aware of course and agrees with plan/care as above.  Given patient's comorbidities, patient's long term prognosis is guarded.   Emotional support provided to patient.  Care reviewed with covering NP/PA, Briseida,  for endorsement to Dr. Geiger.    Jhoan Owens MD  Available on [employer] Teams.

## 2022-06-02 NOTE — H&P ADULT - ASSESSMENT
NIGHT HOSPITALIST:  Referral of patient for severe anaemia with no clear clinical evidence of GI losses with a complex medical history of noncirrhotic portal HTN with gastric varices, past COVID-19 infection in April 2021 with subsequent COVID-19 vaccination, ESRD on HD, JAK2 + PV with conversion to myelofibrosis on Jakafi and danazol per hematology, massive splenomegaly with past splenic vein thrombosis and multiple prior transfusions.    Now receiving one unit red blood cell transfusion with HD.    Would consider consult by her referring haematologist in the AM.   Will defer Jakafi to haematology, with continuation of danazol per heme.

## 2022-06-02 NOTE — DISCHARGE NOTE NURSING/CASE MANAGEMENT/SOCIAL WORK - PATIENT PORTAL LINK FT
You can access the FollowMyHealth Patient Portal offered by [facility] by registering at the following website: [email or web address]. By joining [employer]’s FollowMyHealth portal, you will also be able to view your health information using other applications (apps) compatible with our system.

## 2022-06-02 NOTE — DISCHARGE NOTE PROVIDER - CARE PROVIDER_API CALL
Jacky Guo)  Hematology; Internal Medicine; Oncology  [address] [place] [address] [id number]  Phone: [phone]  Fax: [phone]  Established Patient  Follow Up Time:

## 2022-06-02 NOTE — PROGRESS NOTE ADULT - SUBJECTIVE AND OBJECTIVE BOX
[facility] DIVISION OF KIDNEY DISEASES AND HYPERTENSION -- FOLLOW UP NOTE  --------------------------------------------------------------------------------  Chief Complaint:/subjective: pt received PRBC and HD yesterday; doing well, feeling better, no complaints    24 hour events: as above        PAST HISTORY  --------------------------------------------------------------------------------  No significant changes to PMH, PSH, FHx, SHx, unless otherwise noted    ALLERGIES & MEDICATIONS  --------------------------------------------------------------------------------  Allergies    No Known Allergies    Intolerances      Standing Inpatient Medications  calcium acetate 1334 milliGRAM(s) Oral three times a day with meals  chlorhexidine 2% Cloths 1 Application(s) Topical <User Schedule>  danazol 200 milliGRAM(s) Oral three times a day  Nephro-deonna 1 Tablet(s) Oral daily  propranolol 10 milliGRAM(s) Oral daily    PRN Inpatient Medications      REVIEW OF SYSTEMS  --------------------------------------------------------------------------------  Gen: No weight changes, fatigue, fevers/chills, weakness  Skin: No rashes  Head/Eyes/Ears/Mouth: No headache;   Respiratory: No dyspnea, cough  CV: No chest pain, PND, orthopnea  GI: No abdominal pain, diarrhea, constipation, nausea, vomiting  : No increased frequency, dysuria, hematuria, nocturia  MSK: No joint pain/swelling; no back pain; no edema  Neuro: No dizziness/lightheadedness, weakness  Heme: No easy bruising or bleeding  Psych: No significant nervousness, anxiety, stress, depression    All other systems were reviewed and are negative, except as noted.    VITALS/PHYSICAL EXAM  --------------------------------------------------------------------------------  T(C): 36.9 (06-02-22 @ 12:16), Max: 37.3 (06-01-22 @ 19:20)  HR: 79 (06-02-22 @ 12:16) (79 - 91)  BP: 113/72 (06-02-22 @ 12:16) (110/73 - 134/82)  RR: 18 (06-02-22 @ 12:16) (16 - 18)  SpO2: 98% (06-02-22 @ 12:16) (97% - 100%)  Wt(kg): --  Adult Advanced Hemodynamics Last 24 Hrs  ABP: --  ABP(mean): --  CVP(mm Hg): --  CO: --  CI: --  PA: --  PA(mean): --  PCWP: --  SVR: --  SVRI: --  Height (cm): 160 (06-01-22 @ 15:34)  Weight (kg): 63.5 (06-01-22 @ 15:34)  BMI (kg/m2): 24.8 (06-01-22 @ 15:34)  BSA (m2): 1.66 (06-01-22 @ 15:34)      06-01-22 @ 07:01  -  06-02-22 @ 07:00  --------------------------------------------------------  IN: 1100 mL / OUT: 3100 mL / NET: -2000 mL      Physical Exam:  	Gen: NAD,   	HEENT:   no jvp  	Pulm: CTA B/L  	CV: RRR, S1S2; no rub  	Back:   no sacral edema  	Abd: +BS, soft,    	: No suprapubic tenderness  	Ext: no edema  	Neuro: awake  	Psych: alert  	Skin: Warm,   	Vascular access:    LABS/STUDIES  --------------------------------------------------------------------------------              8.2    7.23  >-----------<  107      [06-02-22 @ 06:27]              26.4     Hemoglobin: 8.2 g/dL (06-02-22 @ 06:27)  Hemoglobin: 6.4 g/dL (06-01-22 @ 19:13)    Platelet Count - Automated: 107 K/uL (06-02-22 @ 06:27)  Platelet Count - Automated: 119 K/uL (06-01-22 @ 19:13)    137  |  100  |  38  ----------------------------<  102      [06-01-22 @ 19:13]  5.0   |  22  |  7.26        Ca     8.5     [06-01-22 @ 19:13]    TPro  7.3  /  Alb  4.1  /  TBili  2.5  /  DBili  x   /  AST  18  /  ALT  13  /  AlkPhos  94  [06-01-22 @ 19:13]    PT/INR: PT 13.4 , INR 1.15       [06-02-22 @ 06:27]  PTT: 43.7       [06-02-22 @ 06:27]      Creatinine Trend:  SCr 7.26 [06-01 @ 19:13]        Iron 36, TIBC 152, %sat 23      [08-11-21 @ 09:35]  Ferritin 998      [08-11-21 @ 09:40]  HbA1c 4.9      [06-01-19 @ 00:46]  TSH 1.78      [09-08-21 @ 09:18]

## 2022-06-02 NOTE — PROGRESS NOTE ADULT - REASON FOR ADMISSION
Sent in by PCP, Dr. Guo for low H/H, generalized weakness.
Sent in by PCP, Dr. Guo for low H/H, generalized weakness.

## 2022-06-02 NOTE — H&P ADULT - PROBLEM SELECTOR PLAN 3
See above.  ON Jakafi and danazol--would consider formal evaluation by patient's haematologist in the AM.

## 2022-06-02 NOTE — DISCHARGE NOTE PROVIDER - NSDCFUSCHEDAPPT_GEN_ALL_CORE_FT
[facility]  [facility]  Scheduled Appointment: 06/14/2022    [facility]  Surg Vasc [address]  Scheduled Appointment: 07/12/2022    Giorgio Dempsey  [facility]  Surg Vasc [address]  Scheduled Appointment: 07/12/2022    Lynn Espinoza  [address]  Scheduled Appointment: 07/21/2022

## 2022-06-02 NOTE — PROGRESS NOTE ADULT - PROBLEM SELECTOR PLAN 1
See above.  Currently receiving HD.  Per renal. ESRD MWF    Plan:  - 1 round HD yesterday  - can f/u outpt for HD

## 2022-06-02 NOTE — PROGRESS NOTE ADULT - PROBLEM SELECTOR PLAN 5
Transitions of Care Status:  1.  Name of PCP:   Jacky Guo MD (hematology) [phone]  2.  PCP Contacted on Admission: [ ] Y    [ ] N    3.  PCP contacted at Discharge: [ ] Y    [ ] N    [ ] N/A  4.  Post-Discharge Appointment Date and Location:  5.  Summary of Handoff given to PCP:

## 2022-06-02 NOTE — H&P ADULT - HISTORY OF PRESENT ILLNESS
NIGHT HOSPITALIST:   Patient remotely known to me from an admission in Oct 2021--assigned to me at this point via the ER and by Dr. Geiger to admit this 60 y/o F--followed by her physicians above--referred by Dr. Guo to the ER following routine blood draw with low H/H and sent to the ER for transfusion with HD.  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 REX Guo, massive splenomegaly with complications of past splenic vein thrombosis in 2015, an admission in Sept 2021 for fevers with no clear bacterial infectious source, with patient noted by her nephrologist to have a low H/H on routine assay with generalized weakness.  No abdominal pain, no red blood per rectum or melena.  NO dysuria, no hematuria.  NO hematemesis or coffee grounds.  NO HA, no focal weakness.  NO vaginal bleeding.  Patient with port placed in Nov 2021 for blood transfusion access at [facility].  NO diaphoresis. NIGHT HOSPITALIST:   Patient remotely known to me from an admission in Oct 2021--seen in the HD unit--assigned to me at this point via the ER and by Dr. Geiger to admit this 60 y/o F--followed by her physicians above--referred by Dr. Guo to the ER following routine blood draw with low H/H and sent to the ER for transfusion with HD.  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 REX Guo, massive splenomegaly with complications of past splenic vein thrombosis in 2015, an admission in Sept 2021 for fevers with no clear bacterial infectious source, with patient noted by her nephrologist to have a low H/H on routine assay with generalized weakness.  No abdominal pain, no red blood per rectum or melena.  NO dysuria, no hematuria.  NO hematemesis or coffee grounds.  NO HA, no focal weakness.  NO vaginal bleeding.  Patient with port placed in Nov 2021 for blood transfusion access at [facility].  NO diaphoresis.

## 2022-06-02 NOTE — H&P ADULT - NSHPSOURCEINFOTX_GEN_ALL_CORE
Reviewed Medex with patient via HIE office note from Dr. Guo and from discharge on Nov 13 2021.  The patient did not wish for the examiner to contact her family at this hour.

## 2022-06-02 NOTE — PROGRESS NOTE ADULT - SUBJECTIVE AND OBJECTIVE BOX
Elmer Larson, PGY1  Internal Medicine      PATIENT: MIHYEON HU, MRN: [id number]    CHIEF COMPLAINT: Patient is a 61y old  Female who presents with a chief complaint of Sent in by PCP, Dr. Guo for low H/H, generalized weakness. (02 Jun 2022 13:04)      INTERVAL HISTORY/OVERNIGHT EVENTS:   - No overnight events. Breathing comf on RA.   - denies CP, SOB, n/v/d, abd pain, dysuria.   - AOx3    REVIEW OF SYSTEMS:  Negative unless noted in HPI      MEDICATIONS:  MEDICATIONS  (STANDING):  calcium acetate 1334 milliGRAM(s) Oral three times a day with meals  danazol 200 milliGRAM(s) Oral three times a day  Nephro-deonna 1 Tablet(s) Oral daily  propranolol 10 milliGRAM(s) Oral daily    MEDICATIONS  (PRN):      ALLERGIES: Allergies    No Known Allergies    Intolerances        OBJECTIVE:    VITALS:   Vital Signs Last 24 Hrs  T(C): 36.9 (02 Jun 2022 12:16), Max: 37.3 (01 Jun 2022 19:20)  T(F): 98.4 (02 Jun 2022 12:16), Max: 99.2 (01 Jun 2022 19:20)  HR: 79 (02 Jun 2022 12:16) (79 - 91)  BP: 113/72 (02 Jun 2022 12:16) (110/73 - 134/82)  BP(mean): --  RR: 18 (02 Jun 2022 12:16) (16 - 18)  SpO2: 98% (02 Jun 2022 12:16) (97% - 100%)    CAPILLARY BLOOD GLUCOSE          I&O's Summary    01 Jun 2022 07:01  -  02 Jun 2022 07:00  --------------------------------------------------------  IN: 1100 mL / OUT: 3100 mL / NET: -2000 mL      Daily Height in cm: 160.02 (01 Jun 2022 15:34)    Daily     PHYSICAL EXAMINATION:  General: Comfortable, no acute distress, cooperative with exam.  HEENT: PERRLA, EOMI, moist mucous membranes.  Respiratory: CTAB, normal respiratory effort, no coughing, wheezes, crackles, or rales.  CV: RRR, S1S2, no murmurs, rubs or gallops. No JVD. Distal pulses intact.  Abdominal: Soft, nontender, nondistended, no rebound or guarding, normal bowel sounds.  Neurology: AOx3, no focal neuro defects, RAHMAN x 4.  Extremities: No pitting edema, + Peripheral pulses.      LABS:                        8.2    7.23  )-----------( 107      ( 02 Jun 2022 06:27 )             26.4     06-01    137  |  100  |  38<H>  ----------------------------<  102<H>  5.0   |  22  |  7.26<H>    Ca    8.5      01 Jun 2022 19:13    TPro  7.3  /  Alb  4.1  /  TBili  2.5<H>  /  DBili  x   /  AST  18  /  ALT  13  /  AlkPhos  94  06-01    PT/INR - ( 02 Jun 2022 06:27 )   PT: 13.4 sec;   INR: 1.15 ratio         PTT - ( 02 Jun 2022 06:27 )  PTT:43.7 sec            MICRO:  Microbiology     EKG:    RADIOLOGY & ADDITIONAL TESTS:    IMAGING:  Elmer Larson, PGY1  Internal Medicine      PATIENT: MIHYEON HU, MRN: [id number]    CHIEF COMPLAINT: Patient is a 61y old  Female who presents with a chief complaint of Sent in by PCP, Dr. Guo for low H/H, generalized weakness. (02 Jun 2022 13:04)      INTERVAL HISTORY/OVERNIGHT EVENTS:   - HD overnight; received 1u pRBC during HD w/o complications.   - Breathing comf on RA. Denies CP, SOB, n/v/d, abd pain, dysuria.     REVIEW OF SYSTEMS:  Negative unless noted in HPI      MEDICATIONS:  MEDICATIONS  (STANDING):  calcium acetate 1334 milliGRAM(s) Oral three times a day with meals  danazol 200 milliGRAM(s) Oral three times a day  Nephro-deonna 1 Tablet(s) Oral daily  propranolol 10 milliGRAM(s) Oral daily    MEDICATIONS  (PRN):      ALLERGIES: Allergies    No Known Allergies    Intolerances        OBJECTIVE:    VITALS:   Vital Signs Last 24 Hrs  T(C): 36.9 (02 Jun 2022 12:16), Max: 37.3 (01 Jun 2022 19:20)  T(F): 98.4 (02 Jun 2022 12:16), Max: 99.2 (01 Jun 2022 19:20)  HR: 79 (02 Jun 2022 12:16) (79 - 91)  BP: 113/72 (02 Jun 2022 12:16) (110/73 - 134/82)  BP(mean): --  RR: 18 (02 Jun 2022 12:16) (16 - 18)  SpO2: 98% (02 Jun 2022 12:16) (97% - 100%)    CAPILLARY BLOOD GLUCOSE          I&O's Summary    01 Jun 2022 07:01  -  02 Jun 2022 07:00  --------------------------------------------------------  IN: 1100 mL / OUT: 3100 mL / NET: -2000 mL      Daily Height in cm: 160.02 (01 Jun 2022 15:34)    Daily     PHYSICAL EXAMINATION:  General: Comfortable, no acute distress, cooperative with exam.  HEENT: PERRLA, EOMI, moist mucous membranes.  Respiratory: CTAB, normal respiratory effort, no coughing, wheezes, crackles, or rales.  CV: RRR, S1S2, no murmurs, rubs or gallops. No JVD. Distal pulses intact.  Abdominal: Soft, nontender, nondistended, no rebound or guarding, normal bowel sounds.  Neurology: AOx3, no focal neuro defects, RAHMAN x 4.  Extremities: No pitting edema, + Peripheral pulses.      LABS:                        8.2    7.23  )-----------( 107      ( 02 Jun 2022 06:27 )             26.4     06-01    137  |  100  |  38<H>  ----------------------------<  102<H>  5.0   |  22  |  7.26<H>    Ca    8.5      01 Jun 2022 19:13    TPro  7.3  /  Alb  4.1  /  TBili  2.5<H>  /  DBili  x   /  AST  18  /  ALT  13  /  AlkPhos  94  06-01    PT/INR - ( 02 Jun 2022 06:27 )   PT: 13.4 sec;   INR: 1.15 ratio         PTT - ( 02 Jun 2022 06:27 )  PTT:43.7 sec            MICRO:  Microbiology     EKG:    RADIOLOGY & ADDITIONAL TESTS:    IMAGING:

## 2022-06-02 NOTE — DISCHARGE NOTE PROVIDER - NSDCMRMEDTOKEN_GEN_ALL_CORE_FT
calcium acetate 667 mg oral tablet: 2 tab(s) orally 3 times a day  danazol 200 mg oral capsule: 1 cap(s) orally 3 times a day  Jakafi 10 mg oral tablet: orally 3 times a week AFTER HEMODIALYSIS  Nephro-Ben oral tablet: 1 tab(s) orally once a day  propranolol 10 mg oral tablet: orally once a day

## 2022-06-02 NOTE — DISCHARGE NOTE NURSING/CASE MANAGEMENT/SOCIAL WORK - NSDCVIVACCINE_GEN_ALL_CORE_FT
influenza, injectable, quadrivalent, preservative free; 20-Nov-2014 12:44; Aston Ulloa (RN); [employer]; [id number]; IntraMuscular; Deltoid Left.; 0.5 milliLiter(s);   influenza, injectable, quadrivalent, preservative free; 05-Oct-2016 18:15; Rosario James (RN); [employer]; [id number]; IntraMuscular; Deltoid Left.; 0.5 milliLiter(s); VIS (VIS Published: 07-Aug-2015, VIS Presented: 05-Oct-2016);

## 2022-06-02 NOTE — DISCHARGE NOTE PROVIDER - HOSPITAL COURSE
61F w Myelofibrosis (Jak2+ PV that transformed to MF, on Jakafi), ESRD on HD M/W/F, non cirrhotic portal HTN with gastric varices, past COVID-19 infection in April 2021,  MF c/b massive splenomegaly and history of splenic vein thrombosis (2015), who was sent to ED bc outpt labs w Hb 6.6. In the ED HgB noted to be 6.4. Pt. last received HD 5/30. Patient notes general fatigue for the past few days with dyspnea. Fatigue worsens with activity. NO abdominal pain, no red blood per rectum or melena.  NO epistaxis.  NO vaginal bleeding.  Ho hematuria.  NO jaundice. Patient with multiple antibodies from prior transfusions. Pt. had port placed on 11/2/22 so that Pt. can receive blood transfusions at [facility]. Pt. states she last received a blood transfusion several months ago. Pt. currently endorses dyspnea on exertion and slight dyspnea at rest as well as increased fatigue.     Hospital Course:  Pt was HD stable and admitted to medicine where she received 1U pRBCs during HD w no complications. Pt had 1 session of HD, and cleared for discharge from nephrology. Pt was astable for discharge with outpt hematology f/u.

## 2022-06-02 NOTE — H&P ADULT - NSHPREVIEWOFSYSTEMS_GEN_ALL_CORE
NO fever, no chills, no rigors.  NO chest pain/pressure.  NO palpitations.  NO cough, no wheezing.  NO rash.  No breast symptoms.    NO vaginal bleeding.  NO hematuria.  NO joint pain.  NO thyroid symptoms.  NO weight loss or anorexia.

## 2022-06-02 NOTE — PROGRESS NOTE ADULT - ASSESSMENT
NIGHT HOSPITALIST:  Referral of patient for severe anaemia with no clear clinical evidence of GI losses with a complex medical history of noncirrhotic portal HTN with gastric varices, past COVID-19 infection in April 2021 with subsequent COVID-19 vaccination, ESRD on HD, JAK2 + PV with conversion to myelofibrosis on Jakafi and danazol per hematology, massive splenomegaly with past splenic vein thrombosis and multiple prior transfusions.    Now receiving one unit red blood cell transfusion with HD.    Would consider consult by her referring haematologist in the AM.   Will defer Jakafi to haematology, with continuation of danazol per heme.     61F w Myelofibrosis (Jak2+ PV that transformed to MF, on Jakafi), ESRD on HD M/W/F, non cirrhotic portal HTN with gastric varices, past COVID-19 infection in April 2021,  MF c/b massive splenomegaly and history of splenic vein thrombosis (2015), who was sent to ED bc outpt labs w Hb 6.6, admitted for pRBC transfusion, s/p 1u pRBC w HD.

## 2022-06-09 ENCOUNTER — OUTPATIENT (OUTPATIENT)
Dept: OUTPATIENT SERVICES | Facility: HOSPITAL | Age: 62
LOS: 1 days | Discharge: HOME | End: 2022-06-09

## 2022-06-09 DIAGNOSIS — D64.9 ANEMIA, UNSPECIFIED: ICD-10-CM

## 2022-06-09 DIAGNOSIS — I77.0 ARTERIOVENOUS FISTULA, ACQUIRED: Chronic | ICD-10-CM

## 2022-06-09 DIAGNOSIS — T85.898A OTHER SPECIFIED COMPLICATION OF OTHER INTERNAL PROSTHETIC DEVICES, IMPLANTS AND GRAFTS, INITIAL ENCOUNTER: Chronic | ICD-10-CM

## 2022-06-09 DIAGNOSIS — Z99.2 DEPENDENCE ON RENAL DIALYSIS: Chronic | ICD-10-CM

## 2022-06-13 ENCOUNTER — LABORATORY RESULT (OUTPATIENT)
Age: 62
End: 2022-06-13

## 2022-06-14 ENCOUNTER — APPOINTMENT (OUTPATIENT)
Dept: INFUSION THERAPY | Facility: HOSPITAL | Age: 62
End: 2022-06-14

## 2022-06-16 ENCOUNTER — RESULT REVIEW (OUTPATIENT)
Age: 62
End: 2022-06-16

## 2022-06-16 ENCOUNTER — APPOINTMENT (OUTPATIENT)
Dept: HEMATOLOGY ONCOLOGY | Facility: CLINIC | Age: 62
End: 2022-06-16
Payer: MEDICAID

## 2022-06-16 VITALS
WEIGHT: 117.7 LBS | DIASTOLIC BLOOD PRESSURE: 80 MMHG | OXYGEN SATURATION: 99 % | TEMPERATURE: 97.1 F | SYSTOLIC BLOOD PRESSURE: 135 MMHG | HEART RATE: 75 BPM | RESPIRATION RATE: 15 BRPM | BODY MASS INDEX: 21.53 KG/M2

## 2022-06-16 LAB
ANISOCYTOSIS BLD QL: SLIGHT — SIGNIFICANT CHANGE UP
BASOPHILS # BLD AUTO: 0.16 K/UL — SIGNIFICANT CHANGE UP (ref 0–0.2)
BASOPHILS NFR BLD AUTO: 2 % — SIGNIFICANT CHANGE UP (ref 0–2)
DACRYOCYTES BLD QL SMEAR: SLIGHT — SIGNIFICANT CHANGE UP
ELLIPTOCYTES BLD QL SMEAR: SLIGHT — SIGNIFICANT CHANGE UP
EOSINOPHIL # BLD AUTO: 0 K/UL — SIGNIFICANT CHANGE UP (ref 0–0.5)
EOSINOPHIL NFR BLD AUTO: 0 % — SIGNIFICANT CHANGE UP (ref 0–6)
HCT VFR BLD CALC: 29.4 % — LOW (ref 34.5–45)
HGB BLD-MCNC: 9.1 G/DL — LOW (ref 11.5–15.5)
LYMPHOCYTES # BLD AUTO: 1.04 K/UL — SIGNIFICANT CHANGE UP (ref 1–3.3)
LYMPHOCYTES # BLD AUTO: 13 % — SIGNIFICANT CHANGE UP (ref 13–44)
MCHC RBC-ENTMCNC: 30 PG — SIGNIFICANT CHANGE UP (ref 27–34)
MCHC RBC-ENTMCNC: 31 G/DL — LOW (ref 32–36)
MCV RBC AUTO: 97 FL — SIGNIFICANT CHANGE UP (ref 80–100)
METAMYELOCYTES # FLD: 1 % — HIGH (ref 0–0)
MONOCYTES # BLD AUTO: 0.24 K/UL — SIGNIFICANT CHANGE UP (ref 0–0.9)
MONOCYTES NFR BLD AUTO: 3 % — SIGNIFICANT CHANGE UP (ref 2–14)
MYELOCYTES NFR BLD: 3 % — HIGH (ref 0–0)
NEUTROPHILS # BLD AUTO: 6.23 K/UL — SIGNIFICANT CHANGE UP (ref 1.8–7.4)
NEUTROPHILS NFR BLD AUTO: 78 % — HIGH (ref 43–77)
NRBC # BLD: 4 /100 — HIGH (ref 0–0)
NRBC # BLD: SIGNIFICANT CHANGE UP /100 WBCS (ref 0–0)
PLAT MORPH BLD: NORMAL — SIGNIFICANT CHANGE UP
PLATELET # BLD AUTO: 146 K/UL — LOW (ref 150–400)
POIKILOCYTOSIS BLD QL AUTO: SLIGHT — SIGNIFICANT CHANGE UP
POLYCHROMASIA BLD QL SMEAR: SLIGHT — SIGNIFICANT CHANGE UP
RBC # BLD: 3.03 M/UL — LOW (ref 3.8–5.2)
RBC # FLD: 24.1 % — HIGH (ref 10.3–14.5)
RBC BLD AUTO: ABNORMAL
WBC # BLD: 7.99 K/UL — SIGNIFICANT CHANGE UP (ref 3.8–10.5)
WBC # FLD AUTO: 7.99 K/UL — SIGNIFICANT CHANGE UP (ref 3.8–10.5)

## 2022-06-16 PROCEDURE — 99214 OFFICE O/P EST MOD 30 MIN: CPT

## 2022-06-24 NOTE — ASSESSMENT
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  She is now under my super vision for the P. Vera. \par Jakafi was initially started at 15mg TIW.  Dose was decreased to 10mg TIW due to anemia though that was likely form the lack of danazol. 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 Would continue this.  \par Dose was decreased to 10mg TIW with dialysis, but did not help with the anemia.  Patient was then started on danazol 400mg daily, then 600mg daily, which at the time, we had thought this improved her HG from the 6's range requiring transfusion to 8.5-9.5g/dl consistently.  \par \par Now patient Hg dropping into the 7's again on what we thought were maximal doses, however patient now admits that somewhere along the line, even very early on in the treatment was prescribed at 600mg, patient was really only taking 1-2 tablets a day, roughly half the dose that we had thought she was on.  On the bright side, this was effective at the time despite suboptimal dosing. Recommend patient take the full dose of 600mg daily, we can always titrate this down after the desired Hg of roughly 9 is achieved and then taper.  \par \par Again reiterated that she should continue Jakafi 10mg TIW which does not effect the anemia beneficially,but does control the splenomegaly and abdominal pain symptoms.  \par \par Peripheral smear review\par RBC with anisocytosis, poikilocytosis, Platelets present, adequate in number , some large platelets, no clumping, normal morphology.  WBC with some abnormalities.  Few nucleated red blood cells noted, 3% peripheral plasma cells, 1% eosinophil, 1% monocytes, 19% lymphocytes, 76% mature segmented neutrophils.  overall does not appear very abnormal compared to most cases of myelofibrosis.  No immature WBC or blasts noted.  \par \par

## 2022-06-24 NOTE — HISTORY OF PRESENT ILLNESS
[de-identified] : This is a 61 year old woman with a history of MAK 2 + polycythemia vera.   diagnosed in 2012. 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 Hollie Guzmán in [place] (# [phone]). She has had a bone marrow biopsy done at the time of diagnosis.  Was previously non complaint with therapy there, however has been quite complaint with the hydroxyurea since coming to [facility] fellows clinic here in 2017.  Patient now follows with Dr. Jacky Guo after closure of the fellows clinic.  \par \par Abdominal US done May 2017 showed: attenuated main splenic vein indicative of previous/chronic thrombosis with surrounding patent varicosities; Portal venous system is patent with hepatopedal blood flow; Patent hepatic veins.\par \par She was admitted to [place] [facility] from July 3-5, 2018 due to hyperkalemia and thrombosis of her AVF. Hematology was consulted and recommended phlebotomy, but patient refused. She was discharged on Hydrea 500mg on HD days.  which she is still on. As of 9/27/19 she has transferred from the hematology fellows clinic to my service at [facility].  \par \par After July admission, Patient was transitioned to Jakafi 10mg, due to anemia.  Since then patient was started on danazol which succeeded in decreasing transfusion requirements.  Last transfusion took place on 12/30/22.  Patient otherwise feels well and has no complaints.  \par  [de-identified] : Yeon [id number]\par  for the follow up of her secondary myelofibrosis, arising from an underlying P. vera, with significant extra medullary symptoms, severe anemia.  Feels well no complaints Hg 8.7-9.2g/dl over past few months, has not required a transfusion in about 3-4 months. No complains \par \par Patient has not experienced any more episodes of fevers chills or abdominal pain like she had prior to the Jakafi. That seems to be helping with the physical manifestations and symptoms of the Jakafi.  \par \par Hg 8.3 today. Hg was 6.5 2 weeks ago patient was admitted for blood transfusion suddenly. Hg wasd eclinign at dialysis for most of may.  We were attempting to give the danazol time to work however it seemed to drop more quickly.\par \par patient was taking care of her mother in law however mother in law had passed away.  \par \par Paitent was coughing a lot, was given a zpack. COVID negative.  \par \par Patient was taking danazol, but adjusted the dose to 1 tablet on non dialysis days, then danazole 2 tablers on dialysis days.  \par \par On the bright isde it otok a half maximal dose to brign the HG stablet to 8-9 over the past 6 months.  Asked patient to take 600mg daily, then we can plan to taper it down again once we have a reliable blood coutn without sending her to the eR.

## 2022-06-26 NOTE — ED PROVIDER NOTE - ATTENDING CONTRIBUTION TO CARE
denies pain/discomfort Dr. Solano : I have personally seen and examined this patient at the bedside. I have fully participated in the care of this patient. I have reviewed all pertinent clinical information, including history, physical exam, plan and the Resident's note and agree except as noted.     58yo F hx of ESRD (T, TH, Sat) last dialyzed today p/w lower back pain and bl thigh weakness/pain x today. sent from Dialysis center to ro cord compression. pt notes lower back pain started on Saturday was doing ok today got dialysis and after dialysis felt more back pain and then weakness and pain to upper thighs, more difficulty with walking. no urinary incontinence or fecal incontinence; notes only has had dialysis for few months. no recent illness  Denies f/c/n/v/cp/sob/palpitations/cough/abd.pain/d/c/dysuria/hematuria. no sick contacts/recent travel.    PE:  head; atraumatic normocephalic  eyes: perrla  Heart: rrr s1s2  lungs: ctab  abd: soft, nt nd + bs no rebound/guarding no cva ttp  le: no swelling no calf ttp; soft calfs soft thighs + ttp to upper thighs bl; slow gate as notes weakness to lower extremities strength 4/5 bl to upper thighs  back: no midline cervical/thoracic/lumbar ttp mild paravertebral lumbar ttp  rectal: no saddle anesthesia, normal rectal tone as per PA exam    -->will check electrolytes to look for metabolic abnormality; ekg ; MRI back to eval for cord compression; spine consult; analgesia--reassess

## 2022-06-28 ENCOUNTER — NON-APPOINTMENT (OUTPATIENT)
Age: 62
End: 2022-06-28

## 2022-06-28 ENCOUNTER — APPOINTMENT (OUTPATIENT)
Dept: OPHTHALMOLOGY | Facility: CLINIC | Age: 62
End: 2022-06-28
Payer: COMMERCIAL

## 2022-06-28 PROCEDURE — 92004 COMPRE OPH EXAM NEW PT 1/>: CPT

## 2022-06-30 ENCOUNTER — APPOINTMENT (OUTPATIENT)
Dept: INFUSION THERAPY | Facility: HOSPITAL | Age: 62
End: 2022-06-30

## 2022-06-30 ENCOUNTER — RESULT REVIEW (OUTPATIENT)
Age: 62
End: 2022-06-30

## 2022-06-30 ENCOUNTER — OUTPATIENT (OUTPATIENT)
Dept: OUTPATIENT SERVICES | Facility: HOSPITAL | Age: 62
LOS: 1 days | End: 2022-06-30
Payer: MEDICAID

## 2022-06-30 DIAGNOSIS — Z99.2 DEPENDENCE ON RENAL DIALYSIS: Chronic | ICD-10-CM

## 2022-06-30 DIAGNOSIS — T85.898A OTHER SPECIFIED COMPLICATION OF OTHER INTERNAL PROSTHETIC DEVICES, IMPLANTS AND GRAFTS, INITIAL ENCOUNTER: Chronic | ICD-10-CM

## 2022-06-30 DIAGNOSIS — I77.0 ARTERIOVENOUS FISTULA, ACQUIRED: Chronic | ICD-10-CM

## 2022-06-30 LAB
ANISOCYTOSIS BLD QL: SLIGHT — SIGNIFICANT CHANGE UP
BASOPHILS # BLD AUTO: 0.32 K/UL — HIGH (ref 0–0.2)
BASOPHILS NFR BLD AUTO: 4 % — HIGH (ref 0–2)
DACRYOCYTES BLD QL SMEAR: SLIGHT — SIGNIFICANT CHANGE UP
ELLIPTOCYTES BLD QL SMEAR: SLIGHT — SIGNIFICANT CHANGE UP
EOSINOPHIL # BLD AUTO: 0.08 K/UL — SIGNIFICANT CHANGE UP (ref 0–0.5)
EOSINOPHIL NFR BLD AUTO: 1 % — SIGNIFICANT CHANGE UP (ref 0–6)
HCT VFR BLD CALC: 30.3 % — LOW (ref 34.5–45)
HGB BLD-MCNC: 9.4 G/DL — LOW (ref 11.5–15.5)
LG PLATELETS BLD QL AUTO: SLIGHT — SIGNIFICANT CHANGE UP
LYMPHOCYTES # BLD AUTO: 1.69 K/UL — SIGNIFICANT CHANGE UP (ref 1–3.3)
LYMPHOCYTES # BLD AUTO: 21 % — SIGNIFICANT CHANGE UP (ref 13–44)
MACROCYTES BLD QL: SLIGHT — SIGNIFICANT CHANGE UP
MCHC RBC-ENTMCNC: 30.6 PG — SIGNIFICANT CHANGE UP (ref 27–34)
MCHC RBC-ENTMCNC: 31 G/DL — LOW (ref 32–36)
MCV RBC AUTO: 98.7 FL — SIGNIFICANT CHANGE UP (ref 80–100)
MONOCYTES # BLD AUTO: 0.32 K/UL — SIGNIFICANT CHANGE UP (ref 0–0.9)
MONOCYTES NFR BLD AUTO: 4 % — SIGNIFICANT CHANGE UP (ref 2–14)
MYELOCYTES NFR BLD: 3 % — HIGH (ref 0–0)
NEUTROPHILS # BLD AUTO: 5.41 K/UL — SIGNIFICANT CHANGE UP (ref 1.8–7.4)
NEUTROPHILS NFR BLD AUTO: 67 % — SIGNIFICANT CHANGE UP (ref 43–77)
NRBC # BLD: 3 /100 — HIGH (ref 0–0)
NRBC # BLD: SIGNIFICANT CHANGE UP /100 WBCS (ref 0–0)
PLAT MORPH BLD: NORMAL — SIGNIFICANT CHANGE UP
PLATELET # BLD AUTO: 161 K/UL — SIGNIFICANT CHANGE UP (ref 150–400)
POIKILOCYTOSIS BLD QL AUTO: SLIGHT — SIGNIFICANT CHANGE UP
RBC # BLD: 3.07 M/UL — LOW (ref 3.8–5.2)
RBC # FLD: 22.8 % — HIGH (ref 10.3–14.5)
RBC BLD AUTO: ABNORMAL
WBC # BLD: 8.07 K/UL — SIGNIFICANT CHANGE UP (ref 3.8–10.5)
WBC # FLD AUTO: 8.07 K/UL — SIGNIFICANT CHANGE UP (ref 3.8–10.5)

## 2022-06-30 PROCEDURE — 86900 BLOOD TYPING SEROLOGIC ABO: CPT

## 2022-06-30 PROCEDURE — 86902 BLOOD TYPE ANTIGEN DONOR EA: CPT

## 2022-06-30 PROCEDURE — 86901 BLOOD TYPING SEROLOGIC RH(D): CPT

## 2022-06-30 PROCEDURE — 86860 RBC ANTIBODY ELUTION: CPT

## 2022-06-30 PROCEDURE — 86870 RBC ANTIBODY IDENTIFICATION: CPT

## 2022-06-30 PROCEDURE — 86850 RBC ANTIBODY SCREEN: CPT

## 2022-06-30 PROCEDURE — 86922 COMPATIBILITY TEST ANTIGLOB: CPT

## 2022-06-30 PROCEDURE — 86880 COOMBS TEST DIRECT: CPT

## 2022-07-01 ENCOUNTER — NON-APPOINTMENT (OUTPATIENT)
Age: 62
End: 2022-07-01

## 2022-07-01 DIAGNOSIS — D50.9 IRON DEFICIENCY ANEMIA, UNSPECIFIED: ICD-10-CM

## 2022-07-11 ENCOUNTER — LABORATORY RESULT (OUTPATIENT)
Age: 62
End: 2022-07-11

## 2022-07-14 ENCOUNTER — APPOINTMENT (OUTPATIENT)
Dept: INFUSION THERAPY | Facility: HOSPITAL | Age: 62
End: 2022-07-14

## 2022-07-14 ENCOUNTER — RESULT REVIEW (OUTPATIENT)
Age: 62
End: 2022-07-14

## 2022-07-14 LAB
ANISOCYTOSIS BLD QL: SLIGHT — SIGNIFICANT CHANGE UP
BASOPHILS # BLD AUTO: 0.08 K/UL — SIGNIFICANT CHANGE UP (ref 0–0.2)
BASOPHILS NFR BLD AUTO: 1 % — SIGNIFICANT CHANGE UP (ref 0–2)
DACRYOCYTES BLD QL SMEAR: SLIGHT — SIGNIFICANT CHANGE UP
ELLIPTOCYTES BLD QL SMEAR: SLIGHT — SIGNIFICANT CHANGE UP
EOSINOPHIL # BLD AUTO: 0.4 K/UL — SIGNIFICANT CHANGE UP (ref 0–0.5)
EOSINOPHIL NFR BLD AUTO: 5 % — SIGNIFICANT CHANGE UP (ref 0–6)
HCT VFR BLD CALC: 29.1 % — LOW (ref 34.5–45)
HGB BLD-MCNC: 9.2 G/DL — LOW (ref 11.5–15.5)
LYMPHOCYTES # BLD AUTO: 1.28 K/UL — SIGNIFICANT CHANGE UP (ref 1–3.3)
LYMPHOCYTES # BLD AUTO: 16 % — SIGNIFICANT CHANGE UP (ref 13–44)
MCHC RBC-ENTMCNC: 31.1 PG — SIGNIFICANT CHANGE UP (ref 27–34)
MCHC RBC-ENTMCNC: 31.6 G/DL — LOW (ref 32–36)
MCV RBC AUTO: 98.3 FL — SIGNIFICANT CHANGE UP (ref 80–100)
MONOCYTES # BLD AUTO: 0.32 K/UL — SIGNIFICANT CHANGE UP (ref 0–0.9)
MONOCYTES NFR BLD AUTO: 4 % — SIGNIFICANT CHANGE UP (ref 2–14)
MYELOCYTES NFR BLD: 2 % — HIGH (ref 0–0)
NEUTROPHILS # BLD AUTO: 5.74 K/UL — SIGNIFICANT CHANGE UP (ref 1.8–7.4)
NEUTROPHILS NFR BLD AUTO: 72 % — SIGNIFICANT CHANGE UP (ref 43–77)
NRBC # BLD: 5 /100 — HIGH (ref 0–0)
NRBC # BLD: SIGNIFICANT CHANGE UP /100 WBCS (ref 0–0)
PLAT MORPH BLD: NORMAL — SIGNIFICANT CHANGE UP
PLATELET # BLD AUTO: 156 K/UL — SIGNIFICANT CHANGE UP (ref 150–400)
POIKILOCYTOSIS BLD QL AUTO: SLIGHT — SIGNIFICANT CHANGE UP
POLYCHROMASIA BLD QL SMEAR: SLIGHT — SIGNIFICANT CHANGE UP
RBC # BLD: 2.96 M/UL — LOW (ref 3.8–5.2)
RBC # FLD: 21.5 % — HIGH (ref 10.3–14.5)
RBC BLD AUTO: ABNORMAL
WBC # BLD: 7.97 K/UL — SIGNIFICANT CHANGE UP (ref 3.8–10.5)
WBC # FLD AUTO: 7.97 K/UL — SIGNIFICANT CHANGE UP (ref 3.8–10.5)

## 2022-07-19 ENCOUNTER — RESULT REVIEW (OUTPATIENT)
Age: 62
End: 2022-07-19

## 2022-07-19 ENCOUNTER — NON-APPOINTMENT (OUTPATIENT)
Age: 62
End: 2022-07-19

## 2022-07-19 ENCOUNTER — APPOINTMENT (OUTPATIENT)
Dept: ENDOVASCULAR SURGERY | Facility: CLINIC | Age: 62
End: 2022-07-19

## 2022-07-19 VITALS
BODY MASS INDEX: 22.06 KG/M2 | SYSTOLIC BLOOD PRESSURE: 108 MMHG | TEMPERATURE: 98.8 F | HEIGHT: 61 IN | RESPIRATION RATE: 18 BRPM | OXYGEN SATURATION: 98 % | WEIGHT: 116.84 LBS | HEART RATE: 73 BPM | DIASTOLIC BLOOD PRESSURE: 71 MMHG

## 2022-07-19 PROCEDURE — 36908Z: CUSTOM

## 2022-07-19 PROCEDURE — 99213 OFFICE O/P EST LOW 20 MIN: CPT | Mod: 25

## 2022-07-19 PROCEDURE — 36902Z: CUSTOM | Mod: 59

## 2022-07-19 RX ORDER — AZITHROMYCIN 250 MG/1
250 TABLET, FILM COATED ORAL
Qty: 1 | Refills: 0 | Status: DISCONTINUED | COMMUNITY
Start: 2022-05-16 | End: 2022-07-19

## 2022-07-20 NOTE — DIETITIAN INITIAL EVALUATION ADULT. - WEIGHT IN LBS
[id number] Partial Purse String (Simple) Text: Given the location of the defect and the characteristics of the surrounding skin a simple purse string closure was deemed most appropriate.  Undermining was performed circumfirentially around the surgical defect.  A purse string suture was then placed and tightened. Wound tension only allowed a partial closure of the circular defect.

## 2022-07-21 NOTE — REASON FOR VISIT
[Other ___] : a [unfilled] visit for [Prolonged Bleeding] : prolonged bleeding [Spouse] : spouse [FreeTextEntry2] : referral from dialysis

## 2022-07-21 NOTE — ASSESSMENT
[Other: _____] : [unfilled] [FreeTextEntry1] : Referral from dialysis for prolonged bleeding. plan for left fistulogram and intervention

## 2022-07-21 NOTE — HISTORY OF PRESENT ILLNESS
[] : left radiocephalic fistula [FreeTextEntry1] : Dr. Dempsey 9/18/17 [FreeTextEntry4] : Yesterday  [FreeTextEntry5] : Yesterday 11pm  [FreeTextEntry6] : Dr. Koroma

## 2022-07-21 NOTE — PROCEDURE
[Resume diet] : resume diet [Site check for bleeding/hematoma/thrill/bruit] : Site check for bleeding/hematoma/thrill/bruit [Vital signs on admission the q 15 mins x2] : Vital signs on admission the q 15 mins x2 [FreeTextEntry1] : left arm fistula/fistulogram/angioplasty and stent

## 2022-07-28 ENCOUNTER — RESULT REVIEW (OUTPATIENT)
Age: 62
End: 2022-07-28

## 2022-07-28 ENCOUNTER — NON-APPOINTMENT (OUTPATIENT)
Age: 62
End: 2022-07-28

## 2022-07-28 ENCOUNTER — APPOINTMENT (OUTPATIENT)
Dept: HEMATOLOGY ONCOLOGY | Facility: CLINIC | Age: 62
End: 2022-07-28

## 2022-07-28 VITALS
HEART RATE: 62 BPM | RESPIRATION RATE: 16 BRPM | WEIGHT: 120.15 LBS | SYSTOLIC BLOOD PRESSURE: 115 MMHG | TEMPERATURE: 98.1 F | DIASTOLIC BLOOD PRESSURE: 74 MMHG | OXYGEN SATURATION: 100 % | BODY MASS INDEX: 22.68 KG/M2 | HEIGHT: 61 IN

## 2022-07-28 LAB
ANISOCYTOSIS BLD QL: SLIGHT — SIGNIFICANT CHANGE UP
BASOPHILS # BLD AUTO: 0.07 K/UL — SIGNIFICANT CHANGE UP (ref 0–0.2)
BASOPHILS NFR BLD AUTO: 1 % — SIGNIFICANT CHANGE UP (ref 0–2)
DACRYOCYTES BLD QL SMEAR: SLIGHT — SIGNIFICANT CHANGE UP
ELLIPTOCYTES BLD QL SMEAR: SLIGHT — SIGNIFICANT CHANGE UP
EOSINOPHIL # BLD AUTO: 0.22 K/UL — SIGNIFICANT CHANGE UP (ref 0–0.5)
EOSINOPHIL NFR BLD AUTO: 3 % — SIGNIFICANT CHANGE UP (ref 0–6)
HCT VFR BLD CALC: 29.6 % — LOW (ref 34.5–45)
HGB BLD-MCNC: 9.4 G/DL — LOW (ref 11.5–15.5)
LYMPHOCYTES # BLD AUTO: 1.6 K/UL — SIGNIFICANT CHANGE UP (ref 1–3.3)
LYMPHOCYTES # BLD AUTO: 22 % — SIGNIFICANT CHANGE UP (ref 13–44)
MCHC RBC-ENTMCNC: 31.3 PG — SIGNIFICANT CHANGE UP (ref 27–34)
MCHC RBC-ENTMCNC: 31.8 G/DL — LOW (ref 32–36)
MCV RBC AUTO: 98.7 FL — SIGNIFICANT CHANGE UP (ref 80–100)
METAMYELOCYTES # FLD: 1 % — HIGH (ref 0–0)
MONOCYTES # BLD AUTO: 0.22 K/UL — SIGNIFICANT CHANGE UP (ref 0–0.9)
MONOCYTES NFR BLD AUTO: 3 % — SIGNIFICANT CHANGE UP (ref 2–14)
NEUTROPHILS # BLD AUTO: 5.1 K/UL — SIGNIFICANT CHANGE UP (ref 1.8–7.4)
NEUTROPHILS NFR BLD AUTO: 70 % — SIGNIFICANT CHANGE UP (ref 43–77)
NRBC # BLD: 0 /100 — SIGNIFICANT CHANGE UP (ref 0–0)
NRBC # BLD: SIGNIFICANT CHANGE UP /100 WBCS (ref 0–0)
PLAT MORPH BLD: NORMAL — SIGNIFICANT CHANGE UP
PLATELET # BLD AUTO: 141 K/UL — LOW (ref 150–400)
POIKILOCYTOSIS BLD QL AUTO: SLIGHT — SIGNIFICANT CHANGE UP
POLYCHROMASIA BLD QL SMEAR: SLIGHT — SIGNIFICANT CHANGE UP
RBC # BLD: 3 M/UL — LOW (ref 3.8–5.2)
RBC # FLD: 20.4 % — HIGH (ref 10.3–14.5)
RBC BLD AUTO: ABNORMAL
WBC # BLD: 7.28 K/UL — SIGNIFICANT CHANGE UP (ref 3.8–10.5)
WBC # FLD AUTO: 7.28 K/UL — SIGNIFICANT CHANGE UP (ref 3.8–10.5)

## 2022-07-28 PROCEDURE — 99214 OFFICE O/P EST MOD 30 MIN: CPT

## 2022-07-29 NOTE — HISTORY OF PRESENT ILLNESS
[de-identified] : Patient returns for for follow up on her myelofibrosis.  On last visti was much more anemic though this turend out to be from taking an inadequate dose of the danazol.\par \par Patient continues to go to dialysis, no complications there\par THough she looked better physically, patient still feeling weak, swollen face. \par \par Despite the increase in hg from the 7's up to the 9.2-9.4 range, patient was paradoxically  felt more fatigue and weakness, and the left upper quadrant pain has returned.  Spleen more palpable on exam.    \par \par Given return of the spleen pain\par \par Patient noticed that there is t3 separate blood draws, at dialysis, at home monthly as well as checks here every 2 weeks. Can cut the draws here at [facility] given Hg stable would not need that anymore.  Would keep the home draw going as that was convenient for her, and ofc course dialysis would need their own draws to facility date the dialysis.  \par \par \par Ran out jakafi yesterday due to insurance issues.  Would have had to get a new auth for the increased dose anyway.

## 2022-07-29 NOTE — PHYSICAL EXAM
[Normal] : normal appearance, no rash, nodules, vesicles, ulcers, erythema [de-identified] : Spleen palpable firm and enlarged past 4 cm below the margin of the diaphragm.

## 2022-08-03 ENCOUNTER — OUTPATIENT (OUTPATIENT)
Dept: OUTPATIENT SERVICES | Facility: HOSPITAL | Age: 62
LOS: 1 days | Discharge: HOME | End: 2022-08-03

## 2022-08-03 DIAGNOSIS — T85.898A OTHER SPECIFIED COMPLICATION OF OTHER INTERNAL PROSTHETIC DEVICES, IMPLANTS AND GRAFTS, INITIAL ENCOUNTER: Chronic | ICD-10-CM

## 2022-08-03 DIAGNOSIS — D64.9 ANEMIA, UNSPECIFIED: ICD-10-CM

## 2022-08-03 DIAGNOSIS — Z99.2 DEPENDENCE ON RENAL DIALYSIS: Chronic | ICD-10-CM

## 2022-08-03 DIAGNOSIS — I77.0 ARTERIOVENOUS FISTULA, ACQUIRED: Chronic | ICD-10-CM

## 2022-08-04 ENCOUNTER — APPOINTMENT (OUTPATIENT)
Dept: VASCULAR SURGERY | Facility: CLINIC | Age: 62
End: 2022-08-04

## 2022-08-08 ENCOUNTER — LABORATORY RESULT (OUTPATIENT)
Age: 62
End: 2022-08-08

## 2022-08-15 ENCOUNTER — RESULT REVIEW (OUTPATIENT)
Age: 62
End: 2022-08-15

## 2022-08-15 ENCOUNTER — OUTPATIENT (OUTPATIENT)
Dept: OUTPATIENT SERVICES | Facility: HOSPITAL | Age: 62
LOS: 1 days | End: 2022-08-15
Payer: MEDICAID

## 2022-08-15 ENCOUNTER — APPOINTMENT (OUTPATIENT)
Dept: INFUSION THERAPY | Facility: HOSPITAL | Age: 62
End: 2022-08-15

## 2022-08-15 DIAGNOSIS — I77.0 ARTERIOVENOUS FISTULA, ACQUIRED: Chronic | ICD-10-CM

## 2022-08-15 DIAGNOSIS — T85.898A OTHER SPECIFIED COMPLICATION OF OTHER INTERNAL PROSTHETIC DEVICES, IMPLANTS AND GRAFTS, INITIAL ENCOUNTER: Chronic | ICD-10-CM

## 2022-08-15 DIAGNOSIS — Z99.2 DEPENDENCE ON RENAL DIALYSIS: Chronic | ICD-10-CM

## 2022-08-15 LAB
ANISOCYTOSIS BLD QL: SLIGHT — SIGNIFICANT CHANGE UP
BASOPHILS # BLD AUTO: 0.16 K/UL — SIGNIFICANT CHANGE UP (ref 0–0.2)
BASOPHILS NFR BLD AUTO: 2 % — SIGNIFICANT CHANGE UP (ref 0–2)
DACRYOCYTES BLD QL SMEAR: SLIGHT — SIGNIFICANT CHANGE UP
ELLIPTOCYTES BLD QL SMEAR: SLIGHT — SIGNIFICANT CHANGE UP
EOSINOPHIL # BLD AUTO: 0.32 K/UL — SIGNIFICANT CHANGE UP (ref 0–0.5)
EOSINOPHIL NFR BLD AUTO: 4 % — SIGNIFICANT CHANGE UP (ref 0–6)
HCT VFR BLD CALC: 27.5 % — LOW (ref 34.5–45)
HGB BLD-MCNC: 8.5 G/DL — LOW (ref 11.5–15.5)
LYMPHOCYTES # BLD AUTO: 0.71 K/UL — LOW (ref 1–3.3)
LYMPHOCYTES # BLD AUTO: 9 % — LOW (ref 13–44)
MACROCYTES BLD QL: SLIGHT — SIGNIFICANT CHANGE UP
MCHC RBC-ENTMCNC: 30.9 G/DL — LOW (ref 32–36)
MCHC RBC-ENTMCNC: 31.1 PG — SIGNIFICANT CHANGE UP (ref 27–34)
MCV RBC AUTO: 100.7 FL — HIGH (ref 80–100)
METAMYELOCYTES # FLD: 2 % — HIGH (ref 0–0)
MONOCYTES # BLD AUTO: 0.32 K/UL — SIGNIFICANT CHANGE UP (ref 0–0.9)
MONOCYTES NFR BLD AUTO: 4 % — SIGNIFICANT CHANGE UP (ref 2–14)
MYELOCYTES NFR BLD: 2 % — HIGH (ref 0–0)
NEUTROPHILS # BLD AUTO: 6.07 K/UL — SIGNIFICANT CHANGE UP (ref 1.8–7.4)
NEUTROPHILS NFR BLD AUTO: 77 % — SIGNIFICANT CHANGE UP (ref 43–77)
NRBC # BLD: 4 /100 — HIGH (ref 0–0)
NRBC # BLD: SIGNIFICANT CHANGE UP /100 WBCS (ref 0–0)
PLAT MORPH BLD: NORMAL — SIGNIFICANT CHANGE UP
PLATELET # BLD AUTO: 107 K/UL — LOW (ref 150–400)
POIKILOCYTOSIS BLD QL AUTO: SLIGHT — SIGNIFICANT CHANGE UP
POLYCHROMASIA BLD QL SMEAR: SLIGHT — SIGNIFICANT CHANGE UP
RBC # BLD: 2.73 M/UL — LOW (ref 3.8–5.2)
RBC # FLD: 19.2 % — HIGH (ref 10.3–14.5)
RBC BLD AUTO: ABNORMAL
SCHISTOCYTES BLD QL AUTO: SLIGHT — SIGNIFICANT CHANGE UP
WBC # BLD: 7.88 K/UL — SIGNIFICANT CHANGE UP (ref 3.8–10.5)
WBC # FLD AUTO: 7.88 K/UL — SIGNIFICANT CHANGE UP (ref 3.8–10.5)

## 2022-08-15 PROCEDURE — 86850 RBC ANTIBODY SCREEN: CPT

## 2022-08-15 PROCEDURE — 86922 COMPATIBILITY TEST ANTIGLOB: CPT

## 2022-08-15 PROCEDURE — 86900 BLOOD TYPING SEROLOGIC ABO: CPT

## 2022-08-15 PROCEDURE — 86901 BLOOD TYPING SEROLOGIC RH(D): CPT

## 2022-08-17 DIAGNOSIS — D50.9 IRON DEFICIENCY ANEMIA, UNSPECIFIED: ICD-10-CM

## 2022-08-18 ENCOUNTER — OUTPATIENT (OUTPATIENT)
Dept: OUTPATIENT SERVICES | Facility: HOSPITAL | Age: 62
LOS: 1 days | End: 2022-08-18
Payer: SELF-PAY

## 2022-08-18 ENCOUNTER — APPOINTMENT (OUTPATIENT)
Dept: INTERNAL MEDICINE | Facility: CLINIC | Age: 62
End: 2022-08-18

## 2022-08-18 DIAGNOSIS — I77.0 ARTERIOVENOUS FISTULA, ACQUIRED: Chronic | ICD-10-CM

## 2022-08-18 DIAGNOSIS — R11.0 NAUSEA: ICD-10-CM

## 2022-08-18 DIAGNOSIS — Z99.2 DEPENDENCE ON RENAL DIALYSIS: Chronic | ICD-10-CM

## 2022-08-18 DIAGNOSIS — Z23 ENCOUNTER FOR IMMUNIZATION: ICD-10-CM

## 2022-08-18 DIAGNOSIS — T85.898A OTHER SPECIFIED COMPLICATION OF OTHER INTERNAL PROSTHETIC DEVICES, IMPLANTS AND GRAFTS, INITIAL ENCOUNTER: Chronic | ICD-10-CM

## 2022-08-18 PROCEDURE — 90471 IMMUNIZATION ADMIN: CPT

## 2022-08-18 PROCEDURE — 90715 TDAP VACCINE 7 YRS/> IM: CPT

## 2022-09-08 ENCOUNTER — APPOINTMENT (OUTPATIENT)
Dept: ENDOVASCULAR SURGERY | Facility: CLINIC | Age: 62
End: 2022-09-08

## 2022-09-08 ENCOUNTER — RESULT REVIEW (OUTPATIENT)
Age: 62
End: 2022-09-08

## 2022-09-08 ENCOUNTER — NON-APPOINTMENT (OUTPATIENT)
Age: 62
End: 2022-09-08

## 2022-09-08 VITALS
RESPIRATION RATE: 16 BRPM | DIASTOLIC BLOOD PRESSURE: 74 MMHG | BODY MASS INDEX: 23.19 KG/M2 | SYSTOLIC BLOOD PRESSURE: 116 MMHG | HEART RATE: 75 BPM | TEMPERATURE: 98.2 F | OXYGEN SATURATION: 98 % | WEIGHT: 126 LBS | HEIGHT: 62 IN

## 2022-09-08 PROCEDURE — 36902Z: CUSTOM

## 2022-09-08 PROCEDURE — 36907Z: CUSTOM | Mod: 59

## 2022-09-08 NOTE — PAST MEDICAL HISTORY
[Increasing age ( >40 years old)] : Increasing age ( >40 years old) [No therapy indicated for cases scheduled for less than one hour] : No therapy indicated for cases scheduled for less than one hour. [FreeTextEntry1] : Malignant Hyperthermia Screening Tool and Risk of Bleeding Assessment\par Ms. MIHYEON HU denies family history of unexpected death following Anesthesia or Exercise.\par Denies Family history of Malignant Hyperthermia, Muscle or Neuromuscular disorder and High Temperature following exercise.\par \par Ms. MIHYEON HU denies history of Muscle Spasm, Dark or Chocolate - Colored urine and Unanticipated fever immediately following anesthesia or serious exercise. \par Ms. BECKMAN also denies bleeding tendencies/ Risks of Bleeding.

## 2022-09-12 ENCOUNTER — LABORATORY RESULT (OUTPATIENT)
Age: 62
End: 2022-09-12

## 2022-09-13 ENCOUNTER — RESULT REVIEW (OUTPATIENT)
Age: 62
End: 2022-09-13

## 2022-09-13 ENCOUNTER — APPOINTMENT (OUTPATIENT)
Dept: INFUSION THERAPY | Facility: HOSPITAL | Age: 62
End: 2022-09-13

## 2022-09-15 ENCOUNTER — APPOINTMENT (OUTPATIENT)
Dept: INFUSION THERAPY | Facility: HOSPITAL | Age: 62
End: 2022-09-15

## 2022-09-15 PROCEDURE — 86901 BLOOD TYPING SEROLOGIC RH(D): CPT

## 2022-09-15 PROCEDURE — 86850 RBC ANTIBODY SCREEN: CPT

## 2022-09-15 PROCEDURE — 86922 COMPATIBILITY TEST ANTIGLOB: CPT

## 2022-09-15 PROCEDURE — 86900 BLOOD TYPING SEROLOGIC ABO: CPT

## 2022-09-15 PROCEDURE — 86902 BLOOD TYPE ANTIGEN DONOR EA: CPT

## 2022-09-16 DIAGNOSIS — Z51.89 ENCOUNTER FOR OTHER SPECIFIED AFTERCARE: ICD-10-CM

## 2022-09-21 ENCOUNTER — INPATIENT (INPATIENT)
Facility: HOSPITAL | Age: 62
LOS: 3 days | Discharge: HOME | DRG: 444 | End: 2022-09-25
Attending: STUDENT IN AN ORGANIZED HEALTH CARE EDUCATION/TRAINING PROGRAM | Admitting: STUDENT IN AN ORGANIZED HEALTH CARE EDUCATION/TRAINING PROGRAM
Payer: MEDICAID

## 2022-09-21 VITALS
HEIGHT: 63 IN | WEIGHT: 119.71 LBS | OXYGEN SATURATION: 97 % | RESPIRATION RATE: 18 BRPM | DIASTOLIC BLOOD PRESSURE: 68 MMHG | HEART RATE: 80 BPM | SYSTOLIC BLOOD PRESSURE: 112 MMHG | TEMPERATURE: 98 F

## 2022-09-21 DIAGNOSIS — I77.0 ARTERIOVENOUS FISTULA, ACQUIRED: Chronic | ICD-10-CM

## 2022-09-21 DIAGNOSIS — T85.898A OTHER SPECIFIED COMPLICATION OF OTHER INTERNAL PROSTHETIC DEVICES, IMPLANTS AND GRAFTS, INITIAL ENCOUNTER: Chronic | ICD-10-CM

## 2022-09-21 DIAGNOSIS — Z99.2 DEPENDENCE ON RENAL DIALYSIS: Chronic | ICD-10-CM

## 2022-09-21 PROCEDURE — 74176 CT ABD & PELVIS W/O CONTRAST: CPT | Mod: 26,MA

## 2022-09-21 PROCEDURE — 99285 EMERGENCY DEPT VISIT HI MDM: CPT

## 2022-09-21 RX ORDER — SODIUM CHLORIDE 9 MG/ML
250 INJECTION INTRAMUSCULAR; INTRAVENOUS; SUBCUTANEOUS ONCE
Refills: 0 | Status: COMPLETED | OUTPATIENT
Start: 2022-09-21 | End: 2022-09-21

## 2022-09-21 NOTE — ED ADULT NURSE NOTE - NSIMPLEMENTINTERV_GEN_ALL_ED
Implemented All Universal Safety Interventions:  [place] to call system. Call bell, personal items and telephone within reach. Instruct patient to call for assistance. Room bathroom lighting operational. Non-slip footwear when patient is off stretcher. Physically safe environment: no spills, clutter or unnecessary equipment. Stretcher in lowest position, wheels locked, appropriate side rails in place.

## 2022-09-21 NOTE — ED ADULT NURSE NOTE - OBJECTIVE STATEMENT
61 y/o F A&Ox4 PMHx c/c dialysis on M/W/F c/c epigastric pain radiates to upper back that started this morning. Pt went to dialysis today and stated that the pain got better because she took Tylenol but pain came back after dialysis. Fistula on L arm.

## 2022-09-22 DIAGNOSIS — K80.50 CALCULUS OF BILE DUCT WITHOUT CHOLANGITIS OR CHOLECYSTITIS WITHOUT OBSTRUCTION: ICD-10-CM

## 2022-09-22 DIAGNOSIS — D45 POLYCYTHEMIA VERA: ICD-10-CM

## 2022-09-22 DIAGNOSIS — I10 ESSENTIAL (PRIMARY) HYPERTENSION: ICD-10-CM

## 2022-09-22 DIAGNOSIS — N18.6 END STAGE RENAL DISEASE: ICD-10-CM

## 2022-09-22 DIAGNOSIS — R10.9 UNSPECIFIED ABDOMINAL PAIN: ICD-10-CM

## 2022-09-22 DIAGNOSIS — N18.9 CHRONIC KIDNEY DISEASE, UNSPECIFIED: ICD-10-CM

## 2022-09-22 DIAGNOSIS — Z87.39 PERSONAL HISTORY OF OTHER DISEASES OF THE MUSCULOSKELETAL SYSTEM AND CONNECTIVE TISSUE: ICD-10-CM

## 2022-09-22 DIAGNOSIS — E83.39 OTHER DISORDERS OF PHOSPHORUS METABOLISM: ICD-10-CM

## 2022-09-22 LAB
ALBUMIN SERPL ELPH-MCNC: 4.6 G/DL — SIGNIFICANT CHANGE UP (ref 3.3–5)
ALP SERPL-CCNC: 108 U/L — SIGNIFICANT CHANGE UP (ref 40–120)
ALT FLD-CCNC: 65 U/L — HIGH (ref 10–45)
ANION GAP SERPL CALC-SCNC: 12 MMOL/L — SIGNIFICANT CHANGE UP (ref 5–17)
APPEARANCE UR: ABNORMAL
APTT BLD: 30.2 SEC — SIGNIFICANT CHANGE UP (ref 27.5–35.5)
AST SERPL-CCNC: 79 U/L — HIGH (ref 10–40)
BACTERIA # UR AUTO: ABNORMAL
BASE EXCESS BLDV CALC-SCNC: 8.1 MMOL/L — HIGH (ref -2–3)
BASOPHILS # BLD AUTO: 0.11 K/UL — SIGNIFICANT CHANGE UP (ref 0–0.2)
BASOPHILS NFR BLD AUTO: 1.5 % — SIGNIFICANT CHANGE UP (ref 0–2)
BILIRUB SERPL-MCNC: 6.7 MG/DL — HIGH (ref 0.2–1.2)
BILIRUB UR-MCNC: ABNORMAL
BUN SERPL-MCNC: 12 MG/DL — SIGNIFICANT CHANGE UP (ref 7–23)
CA-I SERPL-SCNC: 1.12 MMOL/L — LOW (ref 1.15–1.33)
CALCIUM SERPL-MCNC: 9.1 MG/DL — SIGNIFICANT CHANGE UP (ref 8.4–10.5)
CHLORIDE BLDV-SCNC: 92 MMOL/L — LOW (ref 96–108)
CHLORIDE SERPL-SCNC: 93 MMOL/L — LOW (ref 96–108)
CO2 BLDV-SCNC: 34 MMOL/L — HIGH (ref 22–26)
CO2 SERPL-SCNC: 29 MMOL/L — SIGNIFICANT CHANGE UP (ref 22–31)
COLOR SPEC: YELLOW — SIGNIFICANT CHANGE UP
CREAT SERPL-MCNC: 3.89 MG/DL — HIGH (ref 0.5–1.3)
DIFF PNL FLD: NEGATIVE — SIGNIFICANT CHANGE UP
EGFR: 13 ML/MIN/1.73M2 — LOW
EOSINOPHIL # BLD AUTO: 0.14 K/UL — SIGNIFICANT CHANGE UP (ref 0–0.5)
EOSINOPHIL NFR BLD AUTO: 1.8 % — SIGNIFICANT CHANGE UP (ref 0–6)
EPI CELLS # UR: 9 /HPF — HIGH
FLUAV AG NPH QL: SIGNIFICANT CHANGE UP
FLUBV AG NPH QL: SIGNIFICANT CHANGE UP
GAS PNL BLDV: 130 MMOL/L — LOW (ref 136–145)
GAS PNL BLDV: SIGNIFICANT CHANGE UP
GAS PNL BLDV: SIGNIFICANT CHANGE UP
GLUCOSE BLDV-MCNC: 124 MG/DL — HIGH (ref 70–99)
GLUCOSE SERPL-MCNC: 121 MG/DL — HIGH (ref 70–99)
GLUCOSE UR QL: ABNORMAL
HCO3 BLDV-SCNC: 33 MMOL/L — HIGH (ref 22–29)
HCT VFR BLD CALC: 32.4 % — LOW (ref 34.5–45)
HCT VFR BLDA CALC: 50 % — HIGH (ref 34.5–46.5)
HGB BLD CALC-MCNC: 16.6 G/DL — HIGH (ref 11.7–16.1)
HGB BLD-MCNC: 9.9 G/DL — LOW (ref 11.5–15.5)
HYALINE CASTS # UR AUTO: 9 /LPF — HIGH (ref 0–2)
IMM GRANULOCYTES NFR BLD AUTO: 7.3 % — HIGH (ref 0–0.9)
INR BLD: 1.12 RATIO — SIGNIFICANT CHANGE UP (ref 0.88–1.16)
KETONES UR-MCNC: NEGATIVE — SIGNIFICANT CHANGE UP
LACTATE BLDV-MCNC: 0.9 MMOL/L — SIGNIFICANT CHANGE UP (ref 0.5–2)
LEUKOCYTE ESTERASE UR-ACNC: NEGATIVE — SIGNIFICANT CHANGE UP
LIDOCAIN IGE QN: 69 U/L — HIGH (ref 7–60)
LYMPHOCYTES # BLD AUTO: 0.76 K/UL — LOW (ref 1–3.3)
LYMPHOCYTES # BLD AUTO: 10 % — LOW (ref 13–44)
MCHC RBC-ENTMCNC: 30.6 GM/DL — LOW (ref 32–36)
MCHC RBC-ENTMCNC: 30.6 PG — SIGNIFICANT CHANGE UP (ref 27–34)
MCV RBC AUTO: 100 FL — SIGNIFICANT CHANGE UP (ref 80–100)
MONOCYTES # BLD AUTO: 0.47 K/UL — SIGNIFICANT CHANGE UP (ref 0–0.9)
MONOCYTES NFR BLD AUTO: 6.2 % — SIGNIFICANT CHANGE UP (ref 2–14)
NEUTROPHILS # BLD AUTO: 5.55 K/UL — SIGNIFICANT CHANGE UP (ref 1.8–7.4)
NEUTROPHILS NFR BLD AUTO: 73.2 % — SIGNIFICANT CHANGE UP (ref 43–77)
NITRITE UR-MCNC: NEGATIVE — SIGNIFICANT CHANGE UP
NRBC # BLD: 3 /100 WBCS — HIGH (ref 0–0)
PCO2 BLDV: 44 MMHG — HIGH (ref 39–42)
PH BLDV: 7.48 — HIGH (ref 7.32–7.43)
PH UR: 8.5 — HIGH (ref 5–8)
PLATELET # BLD AUTO: 119 K/UL — LOW (ref 150–400)
PO2 BLDV: 45 MMHG — SIGNIFICANT CHANGE UP (ref 25–45)
POTASSIUM BLDV-SCNC: 3.7 MMOL/L — SIGNIFICANT CHANGE UP (ref 3.5–5.1)
POTASSIUM SERPL-MCNC: 3.9 MMOL/L — SIGNIFICANT CHANGE UP (ref 3.5–5.3)
POTASSIUM SERPL-SCNC: 3.9 MMOL/L — SIGNIFICANT CHANGE UP (ref 3.5–5.3)
PROT SERPL-MCNC: 7.8 G/DL — SIGNIFICANT CHANGE UP (ref 6–8.3)
PROT UR-MCNC: ABNORMAL
PROTHROM AB SERPL-ACNC: 12.9 SEC — SIGNIFICANT CHANGE UP (ref 10.5–13.4)
RBC # BLD: 3.24 M/UL — LOW (ref 3.8–5.2)
RBC # FLD: 18.1 % — HIGH (ref 10.3–14.5)
RBC CASTS # UR COMP ASSIST: 3 /HPF — SIGNIFICANT CHANGE UP (ref 0–4)
RSV RNA NPH QL NAA+NON-PROBE: SIGNIFICANT CHANGE UP
SAO2 % BLDV: 72.7 % — SIGNIFICANT CHANGE UP (ref 67–88)
SARS-COV-2 RNA SPEC QL NAA+PROBE: SIGNIFICANT CHANGE UP
SODIUM SERPL-SCNC: 134 MMOL/L — LOW (ref 135–145)
SP GR SPEC: 1.01 — SIGNIFICANT CHANGE UP (ref 1.01–1.02)
TROPONIN T, HIGH SENSITIVITY RESULT: 21 NG/L — SIGNIFICANT CHANGE UP (ref 0–51)
TROPONIN T, HIGH SENSITIVITY RESULT: 22 NG/L — SIGNIFICANT CHANGE UP (ref 0–51)
UROBILINOGEN FLD QL: NEGATIVE — SIGNIFICANT CHANGE UP
WBC # BLD: 7.58 K/UL — SIGNIFICANT CHANGE UP (ref 3.8–10.5)
WBC # FLD AUTO: 7.58 K/UL — SIGNIFICANT CHANGE UP (ref 3.8–10.5)
WBC UR QL: 8 /HPF — HIGH (ref 0–5)

## 2022-09-22 PROCEDURE — 71045 X-RAY EXAM CHEST 1 VIEW: CPT | Mod: 26

## 2022-09-22 PROCEDURE — 99254 IP/OBS CNSLTJ NEW/EST MOD 60: CPT | Mod: 25

## 2022-09-22 PROCEDURE — 43262 ENDO CHOLANGIOPANCREATOGRAPH: CPT | Mod: 59,GC

## 2022-09-22 PROCEDURE — 43259 EGD US EXAM DUODENUM/JEJUNUM: CPT | Mod: GC

## 2022-09-22 PROCEDURE — 43274 ERCP DUCT STENT PLACEMENT: CPT | Mod: GC

## 2022-09-22 PROCEDURE — 76705 ECHO EXAM OF ABDOMEN: CPT | Mod: 26

## 2022-09-22 PROCEDURE — 99222 1ST HOSP IP/OBS MODERATE 55: CPT

## 2022-09-22 PROCEDURE — 99223 1ST HOSP IP/OBS HIGH 75: CPT | Mod: GC

## 2022-09-22 PROCEDURE — 43264 ERCP REMOVE DUCT CALCULI: CPT | Mod: GC

## 2022-09-22 PROCEDURE — 99223 1ST HOSP IP/OBS HIGH 75: CPT

## 2022-09-22 DEVICE — CATH BLLN EXTRACT DIST GUIDE WIRE 15MM 3LUM: Type: IMPLANTABLE DEVICE | Status: FUNCTIONAL

## 2022-09-22 DEVICE — GWIRE JAGTOME REVOLUTION RX 260CM/0.025IN: Type: IMPLANTABLE DEVICE | Status: FUNCTIONAL

## 2022-09-22 DEVICE — GWIRE VISIGLIDE ST TIP 270CM: Type: IMPLANTABLE DEVICE | Status: FUNCTIONAL

## 2022-09-22 RX ORDER — DANAZOL 200 MG/1
200 CAPSULE ORAL THREE TIMES A DAY
Refills: 0 | Status: DISCONTINUED | OUTPATIENT
Start: 2022-09-22 | End: 2022-09-25

## 2022-09-22 RX ORDER — ONDANSETRON 8 MG/1
4 TABLET, FILM COATED ORAL ONCE
Refills: 0 | Status: COMPLETED | OUTPATIENT
Start: 2022-09-22 | End: 2022-09-22

## 2022-09-22 RX ORDER — ACETAMINOPHEN 500 MG
650 TABLET ORAL EVERY 6 HOURS
Refills: 0 | Status: DISCONTINUED | OUTPATIENT
Start: 2022-09-22 | End: 2022-09-24

## 2022-09-22 RX ORDER — HEPARIN SODIUM 5000 [USP'U]/ML
5000 INJECTION INTRAVENOUS; SUBCUTANEOUS EVERY 8 HOURS
Refills: 0 | Status: DISCONTINUED | OUTPATIENT
Start: 2022-09-22 | End: 2022-09-25

## 2022-09-22 RX ORDER — HYDROMORPHONE HYDROCHLORIDE 2 MG/ML
1 INJECTION INTRAMUSCULAR; INTRAVENOUS; SUBCUTANEOUS
Refills: 0 | Status: DISCONTINUED | OUTPATIENT
Start: 2022-09-22 | End: 2022-09-22

## 2022-09-22 RX ORDER — HYDROMORPHONE HYDROCHLORIDE 2 MG/ML
0.5 INJECTION INTRAMUSCULAR; INTRAVENOUS; SUBCUTANEOUS
Refills: 0 | Status: DISCONTINUED | OUTPATIENT
Start: 2022-09-22 | End: 2022-09-22

## 2022-09-22 RX ORDER — ACETAMINOPHEN 500 MG
1000 TABLET ORAL ONCE
Refills: 0 | Status: COMPLETED | OUTPATIENT
Start: 2022-09-22 | End: 2022-09-22

## 2022-09-22 RX ORDER — CALCIUM ACETATE 667 MG
1334 TABLET ORAL
Refills: 0 | Status: DISCONTINUED | OUTPATIENT
Start: 2022-09-22 | End: 2022-09-25

## 2022-09-22 RX ORDER — SODIUM CHLORIDE 9 MG/ML
1000 INJECTION, SOLUTION INTRAVENOUS
Refills: 0 | Status: DISCONTINUED | OUTPATIENT
Start: 2022-09-22 | End: 2022-09-23

## 2022-09-22 RX ADMIN — HYDROMORPHONE HYDROCHLORIDE 0.5 MILLIGRAM(S): 2 INJECTION INTRAMUSCULAR; INTRAVENOUS; SUBCUTANEOUS at 21:41

## 2022-09-22 RX ADMIN — ONDANSETRON 4 MILLIGRAM(S): 8 TABLET, FILM COATED ORAL at 21:27

## 2022-09-22 RX ADMIN — HEPARIN SODIUM 5000 UNIT(S): 5000 INJECTION INTRAVENOUS; SUBCUTANEOUS at 21:38

## 2022-09-22 RX ADMIN — DANAZOL 200 MILLIGRAM(S): 200 CAPSULE ORAL at 09:52

## 2022-09-22 RX ADMIN — SODIUM CHLORIDE 250 MILLILITER(S): 9 INJECTION INTRAMUSCULAR; INTRAVENOUS; SUBCUTANEOUS at 00:40

## 2022-09-22 RX ADMIN — SODIUM CHLORIDE 250 MILLILITER(S): 9 INJECTION INTRAMUSCULAR; INTRAVENOUS; SUBCUTANEOUS at 02:30

## 2022-09-22 RX ADMIN — HYDROMORPHONE HYDROCHLORIDE 0.5 MILLIGRAM(S): 2 INJECTION INTRAMUSCULAR; INTRAVENOUS; SUBCUTANEOUS at 21:27

## 2022-09-22 RX ADMIN — SODIUM CHLORIDE 100 MILLILITER(S): 9 INJECTION, SOLUTION INTRAVENOUS at 09:34

## 2022-09-22 RX ADMIN — Medication 400 MILLIGRAM(S): at 22:10

## 2022-09-22 RX ADMIN — SODIUM CHLORIDE 100 MILLILITER(S): 9 INJECTION, SOLUTION INTRAVENOUS at 21:37

## 2022-09-22 NOTE — PROGRESS NOTE ADULT - SUBJECTIVE AND OBJECTIVE BOX
afeb  soft / mild RUQ tenderness / ND  old PD catheters cars  no jaundice    WBC = 7  T bili = 6.7    RUQ U/S (9/22) - cholelithiasis w/o evidence of acute cholecystitis. CBD = 9 mm.

## 2022-09-22 NOTE — CONSULT NOTE ADULT - PROBLEM SELECTOR RECOMMENDATION 9
ESRD On HD MWF using LUE fistula     [] Will plan for HD tomorrow. Consent done  [] no signs of volume overload and no indications for emergent HD given stable electrolytes ESRD On HD MWF using LUE fistula     [] Will plan for HD tomorrow. Consent done  [] no signs of volume overload and no indications for emergent HD given stable electrolytes  [] please obtain a UA with culture as patient complaining of some flank pain

## 2022-09-22 NOTE — ED PROVIDER NOTE - PROGRESS NOTE DETAILS
Fabio BAH PGY2: pt found to have elevated lfts dilated cbd on imaging. surgery evaluated pt and will take on their service.

## 2022-09-22 NOTE — ED ADULT NURSE REASSESSMENT NOTE - NS ED NURSE REASSESS COMMENT FT1
port accessed as per MD order, chest x-ray done prior to insertion, sterile technique maintained, successful on first attempt, positive blood return, flushes without resistance.

## 2022-09-22 NOTE — PRE-ANESTHESIA EVALUATION ADULT - NSATTENDATTESTRD_GEN_ALL_CORE
Continue to MONITOR CLOSELY to determine the need for TREATMENT and INCREASE/DECREASE in length of time till next follow up visit. The patient has been re-examined and I agree with the above assessment or I updated with my findings.

## 2022-09-22 NOTE — H&P ADULT - ATTENDING COMMENTS
ATTENDING ATTESTATION:    I provided this service on 9/22/2022.     62 year old woman with PMH of polycythemia vera on HD (MWF), portal hypertension without cirrhosis, and splenomegaly presents with acute RUQ abdominal pain found to have hyperbilirubinemia and CBD dilation concerning for choledocholithiasis.   On and off pain for 2 months, worse acutely yesterday  Per patient, ?uncle had history of bilary/pancreatic cancer?  Exam afebrile, abd soft, NT/ND  WBC 7.58, Cr 3.89, K 3.9  AST 79, ALT 65, alk phos 108, tbili 6.7  U/S and CT with cholelithiasis and CBD dilated to 9mm, extensive periportal and gastic varices on previous CT 2021  Plan suspect possible choledocholithiasis given new CBD dilation and hyperbilirubinemia vs a new malignancy given acute nature. Of note the patient's daughter is giving birth today or tomorrow so the patient is eager to be home by the weekend to help. Will admit to surgery and consult GI for possible EUS/ERCP with stent/sphincterotomy and defer lap cholecystectomy given her social situation and in the setting of known portal hypertension with periportal varices. Discussed this at length with the patient at bedside who agrees with the plan.    I have seen and examined this patient with the surgery team. I have reviewed all new labs, imaging and reports. I have participated in formulating the plan, and have read and agree with the history, ROS, exam, assessment and plan as stated above.    Total time spent in the care of this patient today (excluding critical care, teaching & procedures): 30 minutes    Over 50% of the total time was spent in discussion and coordination of care.    Renae Vidales MD  Acute Care Surgery

## 2022-09-22 NOTE — PATIENT PROFILE ADULT - NSPROGENPREVTRANSF_GEN_A_NUR
Pt states her pain has decreased to 3/10 at this time, resps easy and unlabored with no distress noted, iv fluids maintained, waiting to go to ct scan.   Report to [facility] care of pt relinquished     Rena Eren  06/13/19 [id number] no

## 2022-09-22 NOTE — ED PROVIDER NOTE - SHIFT CHANGE DETAILS
Deangelo Olvera MD FACEP note of transfer at the usual time of sign out: Receiving team will follow up on labs, analgesia, any clinical imaging, reassess and disposition as clinically indicated.  Details of patient and plan conveyed to receiving physician and conveyed back for understanding.  There were no questions at this time about the patient's status, disposition, and plan. Patient's care to be taken over by receiving physician at this time, all decisions regarding the progression of care will be made at their discretion.

## 2022-09-22 NOTE — ED ADULT NURSE REASSESSMENT NOTE - NS ED NURSE REASSESS COMMENT FT1
Report given to Donnie BANKS on 2 [facility], Pt currently at endoscopy, endoscopy contacted and made aware of Pts Bed on 2 [facility].

## 2022-09-22 NOTE — CONSULT NOTE ADULT - PROBLEM SELECTOR RECOMMENDATION 3
Please check phosphorus and calcium with every bmp in a patient who is ESRD    []continue home phos binders  [] ensure renal compliant diet, low phos      If you have any questions, please feel free to contact me  Keshawn Farfan  Nephrology Fellow  [phone]; Prefer [employer] TEAMS  (After 5pm or on weekends please page the on-call fellow).    Patient was discussed with Dr. Stewart  who agrees with my A/P. Addendum to follow
-Patient with a history of myelofibrosis with associated anemia, continue home dose of Danazol 200mg TID

## 2022-09-22 NOTE — CONSULT NOTE ADULT - PROBLEM SELECTOR RECOMMENDATION 4
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 De León at [facility]  -Renal consulted, follow up recs  -Continue Nephrovite daily  -Continue calcium acetate 1334mg TID with meals  -Renal diet when taking PO

## 2022-09-22 NOTE — PACU DISCHARGE NOTE - THE ANESTHESIA ORDERS USED IN THE PACU ORDER SET WILL BE DISCONTINUED UPON TRANSFER OF THIS PATIENT
The patient was noted to be in atrial fibrillation in PACU.  He is asymptomatic, no palpitations/SOB/CP/dizziness.  He reports no history of abnormal cardiac rhythm or other cardiac disease besides his HTN.  No prior ECGs in the chart.    The patient was transferred to the ER for cardiac work up regarding his new onset afib. Statement Selected

## 2022-09-22 NOTE — ED PROVIDER NOTE - CLINICAL SUMMARY MEDICAL DECISION MAKING FREE TEXT BOX
61 yo F above pmhx p.w epigastric abd pain a/w nausea. VSS. abd ttp epigastric / ruq. c/f hepatobiliary vs pancreatic vs pud vs enteritis. lower c/f atypical acs vs appy vs diverticulitis. plan labs ivf meds CT US cxr ekg.

## 2022-09-22 NOTE — CONSULT NOTE ADULT - SUBJECTIVE AND OBJECTIVE BOX
TRAUMA/ACUTE CARE SURGERY - HOSPITAL MEDICINE CO-MANAGEMENT INITIAL VISIT NOTE  Neymar DavidreannaDO  Pager: [phone]  Office: [phone]    HPI: 62 year old female with PMH myelofibrosis, polycythemia vera, ESRD on HD via LUE AVF MWF, and portal HTN who presented with 2 months of abdominal pain which has worsened in the last few days. Additionally, she has had nausea without vomiting. On arrival, she was found to have bilirubin of 6.7. Her CT A/P showed 9mm CBD dilation and cholelithiasis and gallbladder distention without associated pericholecystic inflammation. RUQ ultrasound showed distended gallbladder containing stones and sludge in addition to the CBD dilation. She was admitted for ERCP with GI.    REVIEW OF SYSTEMS:    CONSTITUTIONAL: No weakness, fevers or chills  EYES/ENT: No visual changes;  No vertigo or throat pain   NECK: No pain or stiffness  RESPIRATORY: No cough, wheezing, hemoptysis; No shortness of breath  CARDIOVASCULAR: No chest pain or palpitations  GASTROINTESTINAL: Endorses abdominal pain with nausea but without vomiting, or hematemesis; No diarrhea or constipation. No melena or hematochezia.  GENITOURINARY: No dysuria, frequency or hematuria  NEUROLOGICAL: No numbness or weakness  SKIN: No itching, burning, rashes, or lesions  MSK: No joint pain, no back pain  HEME: No easy bleeding, no easy bruising  All other review of systems is negative unless indicated above.      Allergies    No Known Allergies    Intolerances    Home Medications: Propranolol 10mg daily, Nephro-Deonna 1 tab daily, Jakafi 10mg 3 times weekly after HD, Danazol 200mg TID and calcium acetate 1334 TID with meals    MEDICATIONS  (STANDING):  calcium acetate 1334 milliGRAM(s) Oral three times a day with meals  danazol 200 milliGRAM(s) Oral three times a day  heparin   Injectable 5000 Unit(s) SubCutaneous every 8 hours  lactated ringers. 1000 milliLiter(s) (100 mL/Hr) IV Continuous <Continuous>  Nephro-deonna 1 Tablet(s) Oral daily  propranolol 10 milliGRAM(s) Oral daily    MEDICATIONS  (PRN):  acetaminophen     Tablet .. 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      Cirrhosis of liver without ascites, unspecified hepatic cirrhosis type      Pancreatic cyst      History of myelofibrosis      Peritoneal dialysis catheter in place  Placed 8/9/2017      Hemoperitoneum as complication of peritoneal dialysis  s/p removal 9/18      AV fistula  Placed 9/18    SOCIAL HISTORY:  [ ] Substance abuse: Denies  [ ] Tobacco: Smokes on average 2 cigarettes daily for the last 10 years  [ ] Alcohol use: Denies    FAMILY HISTORY:  Family history of hypertension in mother    Family history of malignant neoplasm (Grandparent)  head and neck in father    FH: cirrhosis (Sibling)    PHYSICAL EXAM:    Vital Signs Last 24 Hrs  T(C): 36.8 (22 Sep 2022 08:13), Max: 36.8 (22 Sep 2022 05:32)  T(F): 98.3 (22 Sep 2022 08:13), Max: 98.3 (22 Sep 2022 08:13)  HR: 73 (22 Sep 2022 08:13) (73 - 80)  BP: 103/67 (22 Sep 2022 08:13) (103/67 - 113/73)  BP(mean): --  RR: 18 (22 Sep 2022 08:13) (18 - 18)  SpO2: 99% (22 Sep 2022 08:13) (97% - 99%)    Parameters below as of 22 Sep 2022 05:32  Patient On (Oxygen Delivery Method): room air    CONSTITUTIONAL: Well-groomed, in no apparent distress  EYES: No conjunctival or scleral injection, non-icteric  ENMT: No external nasal lesions; oral mucosa with moist membranes  RESPIRATORY: Breathing comfortably; lungs CTA without wheeze/rhonchi/rales  CARDIOVASCULAR: +S1S2, RRR, no M/G/R;  GASTROINTESTINAL: Soft, tender to palpation in epigastric region, no rebound/guarding  MUSCULOSKELETAL: No digital clubbing or cyanosis;   SKIN: No rashes or ulcers noted; no subcutaneous nodules or induration palpable  NEUROLOGIC: CN II-XII intact; normal reflexes in upper and lower extremities; sensation intact in LEs b/l to light touch  PSYCHIATRIC: A+O x 3; mood and affect appropriate; appropriate insight and judgment      LABS:                        9.9    7.58  )-----------( 119      ( 22 Sep 2022 00:21 )             32.4     Hemoglobin: 9.9 g/dL (09-22 @ 00:21)    09-22    134<L>  |  93<L>  |  12  ----------------------------<  121<H>  3.9   |  29  |  3.89<H>    Ca    9.1      22 Sep 2022 00:21    TPro  7.8  /  Alb  4.6  /  TBili  6.7<H>  /  DBili  x   /  AST  79<H>  /  ALT  65<H>  /  AlkPhos  108  09-22    PT/INR - ( 22 Sep 2022 11:10 )   PT: 12.9 sec;   INR: 1.12 ratio         PTT - ( 22 Sep 2022 11:10 )  PTT:30.2 sec

## 2022-09-22 NOTE — CONSULT NOTE ADULT - PROBLEM SELECTOR RECOMMENDATION 2
Hgb is 9.9, below goal    [] Will check outpatient HD recs to determine epo dose
-Patient with a history of polycythemia vera and follows with Dr. Jacky Guo  -Heme called, hold Jakafi 10mg TIW with HD for now

## 2022-09-22 NOTE — CONSULT NOTE ADULT - SUBJECTIVE AND OBJECTIVE BOX
HPI:  62F Romansh speaking PMHx myelofibrosis polycythemia on HD (AVF on LUE, MWF), hx of portal HTN w/o cirrhosis presents with 2 months of abdominal pain worsening over last few days with associated nausea but no VD and no decrease in PO tolerance. Denies fevers, chills, lightheadedness, SOB, CP. Patient states she had prior EGD many years ago. Not on any antiplatelet/anticoagulation. Upon arrival, patient HDS, labs w/o leukocytosis, BIli 6.7 AST/ALT 79/65, lipase elevated 69. Imaging showed CT mild CBD dilatation up to 9 mm. Cholelithiasis and gallbladder distention without associated pericholecystic inflammation.    Allergies:  No Known Allergies      Home Medications:    Hospital Medications:  acetaminophen     Tablet .. 650 milliGRAM(s) Oral every 6 hours PRN  danazol 200 milliGRAM(s) Oral three times a day  heparin   Injectable 5000 Unit(s) SubCutaneous every 8 hours  lactated ringers. 1000 milliLiter(s) IV Continuous <Continuous>      PMHX/PSHX:  Polycythemia vera    Peptic ulcer disease    Hypertension    Splenomegaly    Splenic infarct    ESRD on peritoneal dialysis    Cirrhosis of liver without ascites, unspecified hepatic cirrhosis type    Pancreatic cyst    History of myelofibrosis    No significant past surgical history    Peritoneal dialysis catheter in place    Hemoperitoneum as complication of peritoneal dialysis    AV fistula        Family history:  No pertinent family history in first degree relatives    Family history of hypertension in mother    Family history of malignant neoplasm (Grandparent)    FH: cirrhosis (Sibling)        Denies family history of colon cancer/polyps, stomach cancer/polyps, pancreatic cancer/masses, liver cancer/disease, ovarian cancer and endometrial cancer.    Social History:   Tob: Denies  EtOH: Denies  Illicit Drugs: Denies    ROS:     General:  No wt loss, fevers, chills, night sweats, fatigue  Eyes:  Good vision, no reported pain  ENT:  No sore throat, pain, runny nose, dysphagia  CV:  No pain, palpitations, hypo/hypertension  Pulm:  No dyspnea, cough, tachypnea, wheezing  GI:  see HPI  :  No pain, bleeding, incontinence, nocturia  Muscle:  No pain, weakness  Neuro:  No weakness, tingling, memory problems  Psych:  No fatigue, insomnia, mood problems, depression  Endocrine:  No polyuria, polydipsia, cold/heat intolerance  Heme:  No petechiae, ecchymosis, easy bruisability  Skin:  No rash, tattoos, scars, edema    PHYSICAL EXAM:     GENERAL:  No acute distress  HEENT:  NCAT, no scleral icterus   CHEST:  no respiratory distress  HEART:  Regular rate and rhythm  ABDOMEN:  Soft, non-tender, non-distended, normoactive bowel sounds,  no masses  EXTREMITIES: No edema  SKIN:  No rash/erythema/ecchymoses/petechiae/wounds/abscess/warm/dry  NEURO:  Alert and oriented x 3, no asterixis    Vital Signs:  Vital Signs Last 24 Hrs  T(C): 36.8 (22 Sep 2022 08:13), Max: 36.8 (22 Sep 2022 05:32)  T(F): 98.3 (22 Sep 2022 08:13), Max: 98.3 (22 Sep 2022 08:13)  HR: 73 (22 Sep 2022 08:13) (73 - 80)  BP: 103/67 (22 Sep 2022 08:13) (103/67 - 113/73)  BP(mean): --  RR: 18 (22 Sep 2022 08:13) (18 - 18)  SpO2: 99% (22 Sep 2022 08:13) (97% - 99%)    Parameters below as of 22 Sep 2022 05:32  Patient On (Oxygen Delivery Method): room air      Daily Height in cm: 160.02 (21 Sep 2022 20:32)    Daily     LABS:                        9.9    7.58  )-----------( 119      ( 22 Sep 2022 00:21 )             32.4     Mean Cell Volume: 100.0 fl (09-22-22 @ 00:21)    09-22    134<L>  |  93<L>  |  12  ----------------------------<  121<H>  3.9   |  29  |  3.89<H>    Ca    9.1      22 Sep 2022 00:21    TPro  7.8  /  Alb  4.6  /  TBili  6.7<H>  /  DBili  x   /  AST  79<H>  /  ALT  65<H>  /  AlkPhos  108  09-22    LIVER FUNCTIONS - ( 22 Sep 2022 00:21 )  Alb: 4.6 g/dL / Pro: 7.8 g/dL / ALK PHOS: 108 U/L / ALT: 65 U/L / AST: 79 U/L / GGT: x               Amylase Serum--      Lipase serum69       Ammonia--                          9.9    7.58  )-----------( 119      ( 22 Sep 2022 00:21 )             32.4       Imaging:  CT A/P 9/21/22  FINDINGS:  Evaluation of solid organs and vascular structures is limited without   intravenous contrast.    LOWER CHEST: Central venous catheter tip in the right atrium. Mild   bibasilar atelectasis.    LIVER: Within normal limits.  BILE DUCTS: Mild CBD dilatation up to 9 mm.  GALLBLADDER: Distended gallbladder. Cholelithiasis. No pericholecystic   inflammation.  SPLEEN: Redemonstrated marked splenomegaly measuring 18 cm in   craniocaudal. Multiple punctate calcifications.  PANCREAS: Within normal limits.  ADRENALS: Within normal limits.  KIDNEYS/URETERS: Atrophic bilateral kidneys. No hydronephrosis.    BLADDER: Within normal limits.  REPRODUCTIVE ORGANS: Uterus and adnexa within normal limits.    BOWEL: No bowel obstruction. Appendix is normal. Duodenal diverticulum.  PERITONEUM: No ascites.  VESSELS: Atherosclerotic changes. Varices again noted.  RETROPERITONEUM/LYMPH NODES: No lymphadenopathy.  ABDOMINAL WALL: Fat-containing umbilical hernia.  BONES: Degenerative changes. Renal osteodystrophy. T9 sclerotic focus,   similar to prior scan of 11/11/2021.    IMPRESSION:  New mild CBD dilatation up to 9 mm. MRI/MRCP is recommended for further   evaluation.    Cholelithiasis and gallbladder distention without associated   pericholecystic inflammation. Recommend correlation with right upper   quadrant ultrasound.    Redemonstrated marked splenomegaly.        RUQ US 9/22/22  FINDINGS:  Liver: Within normal limits.  Bile ducts: Common bile duct is dilated, measuring 9 mm.  Gallbladder: Distended with stones and sludge. No wall thickening or   pericholecystic edema. Negative sonographic Arguello sign.  Pancreas: Visualized portions are within normal limits.  Right kidney: 6.6 cm. No hydronephrosis. Renal cortical atrophy.   Increased echogenicity suggesting medical renal disease. Tiny cysts   measuring up to 5 mm.  Ascites: None.  IVC: Visualized portions are within normal limits.    IMPRESSION:  CBD dilatation up to 9 mm. MRI/MRCP is recommended for further evaluation.    Distended gallbladder containing stones and sludge. No sonographic   evidence of acute cholecystitis.

## 2022-09-22 NOTE — H&P ADULT - NSHPPHYSICALEXAM_GEN_ALL_CORE
PHYSICAL EXAM  GENERAL: NAD, lying in bed comfortably  CHEST: nonlabored, no increased WOB  ABDOMEN: Soft, ND, TTP RUQ and epigastric pain   EXTREMITIES: Well perfused. No clubbing, cyanosis, or edema  NERVOUS SYSTEM:  Alert & Oriented X3

## 2022-09-22 NOTE — H&P ADULT - NSHPLABSRESULTS_GEN_ALL_CORE
.  LABS:                         9.9    7.58  )-----------( 119      ( 22 Sep 2022 00:21 )             32.4     09-22    134<L>  |  93<L>  |  12  ----------------------------<  121<H>  3.9   |  29  |  3.89<H>    Ca    9.1      22 Sep 2022 00:21    TPro  7.8  /  Alb  4.6  /  TBili  6.7<H>  /  DBili  x   /  AST  79<H>  /  ALT  65<H>  /  AlkPhos  108  09-22              RADIOLOGY, EKG & ADDITIONAL TESTS: Reviewed.

## 2022-09-22 NOTE — PROGRESS NOTE ADULT - ASSESSMENT
cholelithiasis with likely choledocholithiasis  -EUS / possible ERCP by GI today   cholelithiasis with likely choledocholithiasis  -EUS / possible ERCP by GI today  -given cirrhosis and other severe medical comorbidities, the risks of cholecystectomy strongly outweighs the benefit

## 2022-09-22 NOTE — ED PROVIDER NOTE - OBJECTIVE STATEMENT
63 yo F pmhx myelofibrosis polycythemia HD 3x a week last today p/w 2 months of abd pain worsening over last few days. Pt Belarusian speaking daughter translating at bedside. reports pain is upper abd intermittent sharp a/w nausea no vomiting. pt able to tolerate PO. no f/c. normal bms last yesterday no hematuria or dysuria. no f/c. no hx of similar pain in past. tylenol with minimal relief. not a/w food. no cp sob.

## 2022-09-22 NOTE — H&P ADULT - ASSESSMENT
62F Luxembourgish speaking PMHx myelofibrosis polycythemia on HD (AVF on LUE, MWF), hx of portal HTN w/o cirrhosis presents with 2 months of abdominal pain worsening over last few days with associated nausea but no VD and no decrease in PO tolerance.   ED CT mild CBD dilatation up to 9 mm. Cholelithiasis and gallbladder distention without associated pericholecystic inflammation.  Lab w/u significant for TBili 6.7, AST/ALT 79/65, lipase elevated 69       Plan:   Admit to [facility] under Dr. Renae Vidales   NPO/IVF  MRCP  consult GI for possible ERCP today in setting of choledocholithiasis    Plan discussed with attending on call, Dr. Flash Kang MD PGY-2  [facility]   [id number]

## 2022-09-22 NOTE — ED PROVIDER NOTE - ATTENDING CONTRIBUTION TO CARE
Deangelo Olvrea MD, FACEP: In this physician's medical judgement based on clinical history and physical exam: patient with increasing abdominal pain   moderate with nausea  few days in duration  patient has taken Tylenol without much relief  ncat  mild distress secondary to condition   no gross deformity of extremities, no asymmetry  no edema  will get iv, cbc, cmp, ekg, cxr, analgesia and antiemetic prn, ct a/p prn  Will follow up on labs, analgesia, imaging, reassess and disposition as clinically indicated.  *The above represents an initial assessment/impression. Please refer to my progress notes below for potential changes in patient clinical course*  Deangelo Olvera MD FACEP note of transfer at the usual time of sign out: Receiving team will follow up on labs, analgesia, any clinical imaging, reassess and disposition as clinically indicated.  Details of patient and plan conveyed to receiving physician and conveyed back for understanding.  There were no questions at this time about the patient's status, disposition, and plan. Patient's care to be taken over by receiving physician at this time, all decisions regarding the progression of care will be made at their discretion.

## 2022-09-22 NOTE — H&P ADULT - HISTORY OF PRESENT ILLNESS
62F Divehi speaking PMHx myelofibrosis polycythemia on HD (AVF on LUE, MWF), hx of portal HTN w/o cirrhosis presents with 2 months of abdominal pain worsening over last few days with associated nausea but no VD and no decrease in PO tolerance. Denies fevers, chills, lightheadedness, SOB, CP.   ED CT mild CBD dilatation up to 9 mm. Cholelithiasis and gallbladder distention without associated pericholecystic inflammation.  Lab w/u significant for TBili 6.7, AST/ALT 79/65, lipase elevated 69

## 2022-09-22 NOTE — CONSULT NOTE ADULT - SUBJECTIVE AND OBJECTIVE BOX
[facility] DIVISION OF KIDNEY DISEASES AND HYPERTENSION -- [phone]  -- INITIAL CONSULT NOTE  --------------------------------------------------------------------------------  HPI:  62 M Uzbek speaking PMhx of myelofibrosis polycythemia on HD MWF, hx of protal HTN w/o cirrhosis here with abdominal pain, N/V. Patient on HD via LUE AVF on [facility], follows with Dr. De León. Goes to [facility] to receive HD. BUn/Cr 12/3.89 Mildly elevated LFTs. RUQ US showing CBD dilatation, patient admitted for further GI workup.       Nephrology consulted for HD management.       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      Cirrhosis of liver without ascites, unspecified hepatic cirrhosis type      Pancreatic cyst      History of myelofibrosis      Peritoneal dialysis catheter in place  Placed 8/9/2017      Hemoperitoneum as complication of peritoneal dialysis  s/p removal 9/18      AV fistula  Placed 9/18        FAMILY HISTORY:  Family history of hypertension in mother    Family history of malignant neoplasm (Grandparent)  head and neck in father    FH: cirrhosis (Sibling)      PAST SOCIAL HISTORY:    ALLERGIES & MEDICATIONS  --------------------------------------------------------------------------------  Allergies    No Known Allergies    Intolerances      Standing Inpatient Medications  danazol 200 milliGRAM(s) Oral three times a day  heparin   Injectable 5000 Unit(s) SubCutaneous every 8 hours  lactated ringers. 1000 milliLiter(s) IV Continuous <Continuous>    PRN Inpatient Medications  acetaminophen     Tablet .. 650 milliGRAM(s) Oral every 6 hours PRN      REVIEW OF SYSTEMS  --------------------------------------------------------------------------------  Gen: No fevers/chills  Skin: No rashes  Head/Eyes/Ears: Normal hearing,   Respiratory: No dyspnea, cough  CV: No chest pain  GI: No abdominal pain, diarrhea  : No dysuria, hematuria  MSK: No  edema  Heme: No easy bruising or bleeding  Psych: No significant depression    All other systems were reviewed and are negative, except as noted.    VITALS/PHYSICAL EXAM  --------------------------------------------------------------------------------  T(C): 36.8 (09-22-22 @ 08:13), Max: 36.8 (09-22-22 @ 05:32)  HR: 73 (09-22-22 @ 08:13) (73 - 80)  BP: 103/67 (09-22-22 @ 08:13) (103/67 - 113/73)  RR: 18 (09-22-22 @ 08:13) (18 - 18)  SpO2: 99% (09-22-22 @ 08:13) (97% - 99%)  Wt(kg): --  Height (cm): 160 (09-21-22 @ 20:32)  Weight (kg): 54.3 (09-21-22 @ 20:32)  BMI (kg/m2): 21.2 (09-21-22 @ 20:32)  BSA (m2): 1.55 (09-21-22 @ 20:32)      Physical Exam:  	Gen: NAD  	HEENT: MMM  	Pulm: CTA B/L  	CV: S1S2  	Abd: Soft, +BS   	Ext: No LE edema B/L  	Neuro: Awake  	Skin: Warm and dry  	Vascular access:    LABS/STUDIES  --------------------------------------------------------------------------------              9.9    7.58  >-----------<  119      [09-22-22 @ 00:21]              32.4     134  |  93  |  12  ----------------------------<  121      [09-22-22 @ 00:21]  3.9   |  29  |  3.89        Ca     9.1     [09-22-22 @ 00:21]    TPro  7.8  /  Alb  4.6  /  TBili  6.7  /  DBili  x   /  AST  79  /  ALT  65  /  AlkPhos  108  [09-22-22 @ 00:21]    PT/INR: PT 12.9 , INR 1.12       [09-22-22 @ 11:10]  PTT: 30.2       [09-22-22 @ 11:10]      Creatinine Trend:  SCr 3.89 [09-22 @ 00:21]    Urinalysis - [01-09-21 @ 12:32]      Color Yellow / Appearance Slightly Turbid / SG 1.021 / pH 8.0      Gluc Trace / Ketone Trace  / Bili Negative / Urobili Negative       Blood Trace / Protein 300 mg/dL / Leuk Est Large / Nitrite Negative      RBC 1 /  / Hyaline 8 / Gran  / Sq Epi  / Non Sq Epi 18 / Bacteria Many      HbA1c 4.9      [06-01-19 @ 00:46]    HBsAb 9.5      [04-10-21 @ 23:35]  HBsAb Reactive      [07-29-21 @ 00:09]  HBsAg Nonreact      [07-29-21 @ 00:09]  HBcAb Nonreact      [07-29-21 @ 00:09]  HCV 0.19, Nonreact      [07-29-21 @ 00:09]  HIV Nonreact      [04-11-21 @ 08:31]    RAHUL: titer 1:80, pattern Homogeneous      [05-16-18 @ 22:43]  Syphilis Screen (Treponema Pallidum Ab) Negative      [09-08-21 @ 09:08]   [facility] DIVISION OF KIDNEY DISEASES AND HYPERTENSION -- [phone]  -- INITIAL CONSULT NOTE  --------------------------------------------------------------------------------  HPI:  62 M Georgian speaking PMhx of myelofibrosis polycythemia on HD MWF, hx of protal HTN w/o cirrhosis here with abdominal pain, N/V. Patient on HD via LUE AVF on [facility], follows with Dr. De León. Goes to [facility] to receive HD. BUn/Cr 12/3.89 Mildly elevated LFTs. RUQ US showing CBD dilatation, patient admitted for further GI workup.  Labs with Hgb of 9.9, no leukocytosis, sodium 134, K 3.9, Cl 93, Bun/cr 12/3.89. LFTs mildly elevated.     Nephrology consulted for HD management. Austin denies WYATT, SOB, CP, feeling volume overloaded    Dialysis Center: [facility]  Access: LUE fistula   Last HD session was Wed 9/21/22  Nephrologist: Dr. 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      Cirrhosis of liver without ascites, unspecified hepatic cirrhosis type      Pancreatic cyst      History of myelofibrosis      Peritoneal dialysis catheter in place  Placed 8/9/2017      Hemoperitoneum as complication of peritoneal dialysis  s/p removal 9/18      AV fistula  Placed 9/18        FAMILY HISTORY:  Family history of hypertension in mother    Family history of malignant neoplasm (Grandparent)  head and neck in father    FH: cirrhosis (Sibling)      PAST SOCIAL HISTORY:    ALLERGIES & MEDICATIONS  --------------------------------------------------------------------------------  Allergies    No Known Allergies    Intolerances      Standing Inpatient Medications  danazol 200 milliGRAM(s) Oral three times a day  heparin   Injectable 5000 Unit(s) SubCutaneous every 8 hours  lactated ringers. 1000 milliLiter(s) IV Continuous <Continuous>    PRN Inpatient Medications  acetaminophen     Tablet .. 650 milliGRAM(s) Oral every 6 hours PRN      REVIEW OF SYSTEMS  --------------------------------------------------------------------------------  Gen: No fevers/chills  Skin: No rashes  Head/Eyes/Ears: Normal hearing,   Respiratory: No dyspnea, cough  CV: No chest pain  GI: + abdominal pain, no diarrhea  : No dysuria, hematuria  MSK: No  edema  Heme: No easy bruising or bleeding  Psych: No significant depression    All other systems were reviewed and are negative, except as noted.    VITALS/PHYSICAL EXAM  --------------------------------------------------------------------------------  T(C): 36.8 (09-22-22 @ 08:13), Max: 36.8 (09-22-22 @ 05:32)  HR: 73 (09-22-22 @ 08:13) (73 - 80)  BP: 103/67 (09-22-22 @ 08:13) (103/67 - 113/73)  RR: 18 (09-22-22 @ 08:13) (18 - 18)  SpO2: 99% (09-22-22 @ 08:13) (97% - 99%)  Wt(kg): --  Height (cm): 160 (09-21-22 @ 20:32)  Weight (kg): 54.3 (09-21-22 @ 20:32)  BMI (kg/m2): 21.2 (09-21-22 @ 20:32)  BSA (m2): 1.55 (09-21-22 @ 20:32)      Physical Exam:  	Gen: NAD  	HEENT: MMM  	Pulm: CTA B/L  	CV: S1S2  	Abd: Soft, +BS   	Ext: No LE edema B/L  	Neuro: Awake  	Skin: Warm and dry  	Vascular access: LUE fistula no diltation or skin thinning    LABS/STUDIES  --------------------------------------------------------------------------------              9.9    7.58  >-----------<  119      [09-22-22 @ 00:21]              32.4     134  |  93  |  12  ----------------------------<  121      [09-22-22 @ 00:21]  3.9   |  29  |  3.89        Ca     9.1     [09-22-22 @ 00:21]    TPro  7.8  /  Alb  4.6  /  TBili  6.7  /  DBili  x   /  AST  79  /  ALT  65  /  AlkPhos  108  [09-22-22 @ 00:21]    PT/INR: PT 12.9 , INR 1.12       [09-22-22 @ 11:10]  PTT: 30.2       [09-22-22 @ 11:10]      Creatinine Trend:  SCr 3.89 [09-22 @ 00:21]    Urinalysis - [01-09-21 @ 12:32]      Color Yellow / Appearance Slightly Turbid / SG 1.021 / pH 8.0      Gluc Trace / Ketone Trace  / Bili Negative / Urobili Negative       Blood Trace / Protein 300 mg/dL / Leuk Est Large / Nitrite Negative      RBC 1 /  / Hyaline 8 / Gran  / Sq Epi  / Non Sq Epi 18 / Bacteria Many      HbA1c 4.9      [06-01-19 @ 00:46]    HBsAb 9.5      [04-10-21 @ 23:35]  HBsAb Reactive      [07-29-21 @ 00:09]  HBsAg Nonreact      [07-29-21 @ 00:09]  HBcAb Nonreact      [07-29-21 @ 00:09]  HCV 0.19, Nonreact      [07-29-21 @ 00:09]  HIV Nonreact      [04-11-21 @ 08:31]    RAHUL: titer 1:80, pattern Homogeneous      [05-16-18 @ 22:43]  Syphilis Screen (Treponema Pallidum Ab) Negative      [09-08-21 @ 09:08]   [facility] DIVISION OF KIDNEY DISEASES AND HYPERTENSION -- [phone]  -- INITIAL CONSULT NOTE  --------------------------------------------------------------------------------  HPI:  62 M Welsh speaking PMhx of myelofibrosis polycythemia on HD MWF, hx of protal HTN w/o cirrhosis here with abdominal pain, N/V. Patient on HD via LUE AVF on [facility], follows with Dr. De León. Goes to [facility] to receive HD. BUn/Cr 12/3.89 Mildly elevated LFTs. RUQ US showing CBD dilatation, patient admitted for further GI workup.  Labs with Hgb of 9.9, no leukocytosis, sodium 134, K 3.9, Cl 93, Bun/cr 12/3.89. LFTs mildly elevated.     Nephrology consulted for HD management. Austin denies WYATT, SOB, CP, feeling volume overloaded    Dialysis Center: [facility]  Access: LUE fistula   Last HD session was Wed 9/21/22  Nephrologist: Dr. 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      Cirrhosis of liver without ascites, unspecified hepatic cirrhosis type      Pancreatic cyst      History of myelofibrosis      Peritoneal dialysis catheter in place  Placed 8/9/2017      Hemoperitoneum as complication of peritoneal dialysis  s/p removal 9/18      AV fistula  Placed 9/18        FAMILY HISTORY:  Family history of hypertension in mother    Family history of malignant neoplasm (Grandparent)  head and neck in father    FH: cirrhosis (Sibling)      PAST SOCIAL HISTORY:    ALLERGIES & MEDICATIONS  --------------------------------------------------------------------------------  Allergies    No Known Allergies    Intolerances      Standing Inpatient Medications  danazol 200 milliGRAM(s) Oral three times a day  heparin   Injectable 5000 Unit(s) SubCutaneous every 8 hours  lactated ringers. 1000 milliLiter(s) IV Continuous <Continuous>    PRN Inpatient Medications  acetaminophen     Tablet .. 650 milliGRAM(s) Oral every 6 hours PRN      REVIEW OF SYSTEMS  --------------------------------------------------------------------------------  Gen: No fevers/chills  Skin: No rashes  Head/Eyes/Ears: Normal hearing,   Respiratory: No dyspnea, cough  CV: No chest pain  GI: + abdominal pain, no diarrhea  : No dysuria, hematuria  MSK: No  edema  Heme: No easy bruising or bleeding  Psych: No significant depression    All other systems were reviewed and are negative, except as noted.    VITALS/PHYSICAL EXAM  --------------------------------------------------------------------------------  T(C): 36.8 (09-22-22 @ 08:13), Max: 36.8 (09-22-22 @ 05:32)  HR: 73 (09-22-22 @ 08:13) (73 - 80)  BP: 103/67 (09-22-22 @ 08:13) (103/67 - 113/73)  RR: 18 (09-22-22 @ 08:13) (18 - 18)  SpO2: 99% (09-22-22 @ 08:13) (97% - 99%)  Wt(kg): --  Height (cm): 160 (09-21-22 @ 20:32)  Weight (kg): 54.3 (09-21-22 @ 20:32)  BMI (kg/m2): 21.2 (09-21-22 @ 20:32)  BSA (m2): 1.55 (09-21-22 @ 20:32)      Physical Exam:  	Gen: NAD  	HEENT: MMM  	Pulm: CTA B/L  	CV: S1S2  	Abd: Soft, +BS   	Ext: No LE edema B/L  	Neuro: Awake  	Skin: Warm and dry  	Vascular access: LUE fistula no diltation or skin thinning    LABS/STUDIES  --------------------------------------------------------------------------------              9.9    7.58  >-----------<  119      [09-22-22 @ 00:21]              32.4     134  |  93  |  12  ----------------------------<  121      [09-22-22 @ 00:21]  3.9   |  29  |  3.89        Ca     9.1     [09-22-22 @ 00:21]    TPro  7.8  /  Alb  4.6  /  TBili  6.7  /  DBili  x   /  AST  79  /  ALT  65  /  AlkPhos  108  [09-22-22 @ 00:21]    PT/INR: PT 12.9 , INR 1.12       [09-22-22 @ 11:10]  PTT: 30.2       [09-22-22 @ 11:10]      Creatinine Trend:  SCr 3.89 [09-22 @ 00:21]    Urinalysis - [01-09-21 @ 12:32]      Color Yellow / Appearance Slightly Turbid / SG 1.021 / pH 8.0      Gluc Trace / Ketone Trace  / Bili Negative / Urobili Negative       Blood Trace / Protein 300 mg/dL / Leuk Est Large / Nitrite Negative      RBC 1 /  / Hyaline 8 / Gran  / Sq Epi  / Non Sq Epi 18 / Bacteria Many      HbA1c 4.9      [06-01-19 @ 00:46]    HBsAb 9.5      [04-10-21 @ 23:35]  HBsAb Reactive      [07-29-21 @ 00:09]  HBsAg Nonreact      [07-29-21 @ 00:09]  HBcAb Nonreact      [07-29-21 @ 00:09]  HCV 0.19, Nonreact      [07-29-21 @ 00:09]  HIV Nonreact      [04-11-21 @ 08:31]    RAHUL: titer 1:80, pattern Homogeneous      [05-16-18 @ 22:43]  Syphilis Screen (Treponema Pallidum Ab) Negative      [09-08-21 @ 09:08]

## 2022-09-22 NOTE — CONSULT NOTE ADULT - PROBLEM SELECTOR RECOMMENDATION 9
-Patient presenting with abdominal pain and nausea found to have bilirubin of 6.7 with imaging showing CBD dilation to 9mm and, cholelithiasis and gallbladder distention without associated pericholecystic inflammation  -For EUS/ERCP with GI today, follow up results

## 2022-09-22 NOTE — CONSULT NOTE ADULT - ATTENDING COMMENTS
No
Ms Landin is a 62 year old woman w/ known portal HTN found to have hyperbilirubinemia and abdominal pain found to have hyperbilirubinemia.  Abdominal pain improved. Suspect choledocholithiasis.  Findings discussed with patient and her daughter. Plan for EUS +/- ERCP.    # abd pain - suspected 2/2 choledocholithiasis. Patient has several factors that support choledocholithiasis, including bili>4 (very strong), CBD>6mm (strong) and age>55 (moderate). DDx includes cholelithiasis as etiology of pain, however less likely. Taken together, can pursue either MRCP or EUS for definitive diagnosis of choledocholithiasis, followed by ERCP.
patient with ESRD came in with abdominal pain found to have CBD dilation, admitted for further work up.   for dialysis tomorrow     tessy boggs  nephrology attending   please contact me on TEAMS   Office- [phone]

## 2022-09-22 NOTE — ED PROVIDER NOTE - PHYSICAL EXAMINATION
Gen: NAD, non-toxic appearing  Head: normal appearing  HEENT: normal conjunctiva, oral mucosa moist  Lung: no respiratory distress, speaking in full sentences, CTA b/l     CV: regular rate and rhythm, no murmurs  Abd: soft, non distended, ttp epigastrium ruq.    MSK: no visible deformities  Neuro: No focal deficits, AAOx3  Skin: Warm  Psych: normal affect

## 2022-09-22 NOTE — ED ADULT NURSE REASSESSMENT NOTE - NS ED NURSE REASSESS COMMENT FT1
Pt returned from US. Repeat blood work obtained via PORT. Pt states that she is very hungry. As per ED Attending Veronika, pt unable to eat. ED Resident Fabio at pt's bedside to discuss plan of care with patient.

## 2022-09-22 NOTE — ED ADULT NURSE REASSESSMENT NOTE - NS ED NURSE REASSESS COMMENT FT1
Pt transport team at bedside to take pt to endoscopy. PT and pt's family instructed to take all belongings with pt. PT's daughter at bedside with pt.

## 2022-09-22 NOTE — PRE-ANESTHESIA EVALUATION ADULT - BMI (KG/M2)
21.8 Scc In Situ Histology Text: Aggregates and nests of atypical pink squamous epithelial cells with abundant eosinophilic cytoplasm and variable keratinization, with underlying dermal inflammation.

## 2022-09-22 NOTE — CONSULT NOTE ADULT - ASSESSMENT
Impression:   62F Yoruba speaking PMHx myelofibrosis polycythemia on HD (AVF on LUE, MWF), hx of portal HTN w/o cirrhosis presents with acute on chronic abd pain, found to have T bili 6.7, CT showing CBD 9mm    # abd pain - suspected 2/2 choledocholithiasis. Patient has several factors that support choledocholithiasis, including bili>4 (very strong), CBD>6mm (strong) and age>55 (moderate). DDx includes cholelithiasis as etiology of pain, however less likely. Taken together, can pursue either MRCP or EUS for definitive diagnosis of choledocholithiasis, followed by ERCP.     Recommendations:  - NPO for EUS +/- ERCP today  - trend liver enzymes  - IVF  - currently no evidence of cholangitis, no need for abx from GI perspective  - rest of care per primary team    GI will continue to follow.     All recommendations are tentative until note is attested by attending.     Sha Mercado, PGY6  Gastroenterology/Hepatology Fellow  During weekdays: Available on [employer] Teams or pager:[phone] ([employer] Short Range Pager); [phone] (Long Range Pager)  Overnight/Weekend: Please page the on-call GI fellow    
ESRD on HD MWF
62 year old female with PMH myelofibrosis, polycythemia vera, ESRD on HD via LUE AVF MWF, and portal HTN who presented with 2 months of abdominal pain due to possible choledocholithiasis.

## 2022-09-22 NOTE — PATIENT PROFILE ADULT - FALL HARM RISK - HARM RISK INTERVENTIONS
Assistance with ambulation/Assistance OOB with selected safe patient handling equipment/Communicate Risk of Fall with Harm to all staff/Discuss with provider need for PT consult/Monitor gait and stability/Provide patient with walking aids - walker, cane, crutches/Reinforce activity limits and safety measures with patient and family/Sit up slowly, dangle for a short time, stand at bedside before walking/Tailored Fall Risk Interventions/Use of alarms - bed, chair and/or voice tab/Visual Cue: Yellow wristband and red socks/Bed in lowest position, wheels locked, appropriate side rails in place/Call bell, personal items and telephone in reach/Instruct patient to call for assistance before getting out of bed or chair/Non-slip footwear when patient is out of bed/[place] to call system/Physically safe environment - no spills, clutter or unnecessary equipment/Purposeful Proactive Rounding/Room/bathroom lighting operational, light cord in reach

## 2022-09-23 DIAGNOSIS — K85.90 ACUTE PANCREATITIS WITHOUT NECROSIS OR INFECTION, UNSPECIFIED: ICD-10-CM

## 2022-09-23 LAB
ALBUMIN SERPL ELPH-MCNC: 3.8 G/DL — SIGNIFICANT CHANGE UP (ref 3.3–5)
ALP SERPL-CCNC: 101 U/L — SIGNIFICANT CHANGE UP (ref 40–120)
ALT FLD-CCNC: 47 U/L — HIGH (ref 10–45)
ANION GAP SERPL CALC-SCNC: 18 MMOL/L — HIGH (ref 5–17)
AST SERPL-CCNC: 37 U/L — SIGNIFICANT CHANGE UP (ref 10–40)
BILIRUB SERPL-MCNC: 3.3 MG/DL — HIGH (ref 0.2–1.2)
BUN SERPL-MCNC: 28 MG/DL — HIGH (ref 7–23)
CALCIUM SERPL-MCNC: 8.5 MG/DL — SIGNIFICANT CHANGE UP (ref 8.4–10.5)
CHLORIDE SERPL-SCNC: 93 MMOL/L — LOW (ref 96–108)
CO2 SERPL-SCNC: 23 MMOL/L — SIGNIFICANT CHANGE UP (ref 22–31)
CREAT SERPL-MCNC: 6.78 MG/DL — HIGH (ref 0.5–1.3)
EGFR: 6 ML/MIN/1.73M2 — LOW
GLUCOSE SERPL-MCNC: 57 MG/DL — LOW (ref 70–99)
HBV CORE AB SER-ACNC: SIGNIFICANT CHANGE UP
HBV CORE IGM SER-ACNC: SIGNIFICANT CHANGE UP
HBV SURFACE AB SER-ACNC: 14.6 MIU/ML — SIGNIFICANT CHANGE UP
HBV SURFACE AG SER-ACNC: SIGNIFICANT CHANGE UP
HCT VFR BLD CALC: 26.7 % — LOW (ref 34.5–45)
HCV AB S/CO SERPL IA: 0.12 S/CO — SIGNIFICANT CHANGE UP (ref 0–0.99)
HCV AB SERPL-IMP: SIGNIFICANT CHANGE UP
HGB BLD-MCNC: 8.1 G/DL — LOW (ref 11.5–15.5)
LIDOCAIN IGE QN: 359 U/L — HIGH (ref 7–60)
MAGNESIUM SERPL-MCNC: 2.1 MG/DL — SIGNIFICANT CHANGE UP (ref 1.6–2.6)
MCHC RBC-ENTMCNC: 30.3 GM/DL — LOW (ref 32–36)
MCHC RBC-ENTMCNC: 30.8 PG — SIGNIFICANT CHANGE UP (ref 27–34)
MCV RBC AUTO: 101.5 FL — HIGH (ref 80–100)
NRBC # BLD: 2 /100 WBCS — HIGH (ref 0–0)
PHOSPHATE SERPL-MCNC: 5.7 MG/DL — HIGH (ref 2.5–4.5)
PLATELET # BLD AUTO: 97 K/UL — LOW (ref 150–400)
POTASSIUM SERPL-MCNC: 4.6 MMOL/L — SIGNIFICANT CHANGE UP (ref 3.5–5.3)
POTASSIUM SERPL-SCNC: 4.6 MMOL/L — SIGNIFICANT CHANGE UP (ref 3.5–5.3)
PROT SERPL-MCNC: 6.9 G/DL — SIGNIFICANT CHANGE UP (ref 6–8.3)
RBC # BLD: 2.63 M/UL — LOW (ref 3.8–5.2)
RBC # FLD: 17.6 % — HIGH (ref 10.3–14.5)
SARS-COV-2 RNA SPEC QL NAA+PROBE: SIGNIFICANT CHANGE UP
SODIUM SERPL-SCNC: 134 MMOL/L — LOW (ref 135–145)
WBC # BLD: 6.13 K/UL — SIGNIFICANT CHANGE UP (ref 3.8–10.5)
WBC # FLD AUTO: 6.13 K/UL — SIGNIFICANT CHANGE UP (ref 3.8–10.5)

## 2022-09-23 PROCEDURE — 99233 SBSQ HOSP IP/OBS HIGH 50: CPT

## 2022-09-23 RX ORDER — SODIUM CHLORIDE 9 MG/ML
1000 INJECTION, SOLUTION INTRAVENOUS
Refills: 0 | Status: DISCONTINUED | OUTPATIENT
Start: 2022-09-23 | End: 2022-09-24

## 2022-09-23 RX ORDER — OXYCODONE HYDROCHLORIDE 5 MG/1
10 TABLET ORAL EVERY 4 HOURS
Refills: 0 | Status: DISCONTINUED | OUTPATIENT
Start: 2022-09-23 | End: 2022-09-25

## 2022-09-23 RX ORDER — OXYCODONE HYDROCHLORIDE 5 MG/1
5 TABLET ORAL EVERY 4 HOURS
Refills: 0 | Status: DISCONTINUED | OUTPATIENT
Start: 2022-09-23 | End: 2022-09-25

## 2022-09-23 RX ADMIN — DANAZOL 200 MILLIGRAM(S): 200 CAPSULE ORAL at 21:38

## 2022-09-23 RX ADMIN — OXYCODONE HYDROCHLORIDE 10 MILLIGRAM(S): 5 TABLET ORAL at 12:08

## 2022-09-23 RX ADMIN — OXYCODONE HYDROCHLORIDE 10 MILLIGRAM(S): 5 TABLET ORAL at 05:18

## 2022-09-23 RX ADMIN — SODIUM CHLORIDE 75 MILLILITER(S): 9 INJECTION, SOLUTION INTRAVENOUS at 19:39

## 2022-09-23 RX ADMIN — Medication 1334 MILLIGRAM(S): at 17:48

## 2022-09-23 RX ADMIN — Medication 1 TABLET(S): at 11:12

## 2022-09-23 RX ADMIN — OXYCODONE HYDROCHLORIDE 5 MILLIGRAM(S): 5 TABLET ORAL at 02:21

## 2022-09-23 RX ADMIN — OXYCODONE HYDROCHLORIDE 5 MILLIGRAM(S): 5 TABLET ORAL at 03:20

## 2022-09-23 RX ADMIN — HEPARIN SODIUM 5000 UNIT(S): 5000 INJECTION INTRAVENOUS; SUBCUTANEOUS at 05:18

## 2022-09-23 RX ADMIN — HEPARIN SODIUM 5000 UNIT(S): 5000 INJECTION INTRAVENOUS; SUBCUTANEOUS at 13:04

## 2022-09-23 RX ADMIN — OXYCODONE HYDROCHLORIDE 5 MILLIGRAM(S): 5 TABLET ORAL at 19:52

## 2022-09-23 RX ADMIN — OXYCODONE HYDROCHLORIDE 10 MILLIGRAM(S): 5 TABLET ORAL at 06:18

## 2022-09-23 RX ADMIN — HEPARIN SODIUM 5000 UNIT(S): 5000 INJECTION INTRAVENOUS; SUBCUTANEOUS at 21:39

## 2022-09-23 RX ADMIN — SODIUM CHLORIDE 75 MILLILITER(S): 9 INJECTION, SOLUTION INTRAVENOUS at 13:10

## 2022-09-23 RX ADMIN — OXYCODONE HYDROCHLORIDE 10 MILLIGRAM(S): 5 TABLET ORAL at 12:38

## 2022-09-23 RX ADMIN — Medication 1334 MILLIGRAM(S): at 11:13

## 2022-09-23 RX ADMIN — OXYCODONE HYDROCHLORIDE 5 MILLIGRAM(S): 5 TABLET ORAL at 21:00

## 2022-09-23 RX ADMIN — DANAZOL 200 MILLIGRAM(S): 200 CAPSULE ORAL at 13:04

## 2022-09-23 NOTE — PROGRESS NOTE ADULT - PROBLEM SELECTOR PLAN 4
-Patient with a history of myelofibrosis with associated anemia, continue home dose of Danazol 200mg TID

## 2022-09-23 NOTE — PROGRESS NOTE ADULT - SUBJECTIVE AND OBJECTIVE BOX
SURGERY DAILY PROGRESS NOTE    Last night, s/p ERCP with sphincterotomy and stent placement.    SUBJECTIVE: Pt seen and examined at bedside. Admits to abdominal pain. Denies chest pain, SOB, palpitations, HA, fever, chills, N/V.      OBJECTIVE:  Vital Signs Last 24 Hrs  T(C): 36.6 (23 Sep 2022 08:13), Max: 36.8 (22 Sep 2022 16:25)  T(F): 97.8 (23 Sep 2022 08:13), Max: 98.3 (22 Sep 2022 16:25)  HR: 69 (23 Sep 2022 08:13) (67 - 84)  BP: 105/58 (23 Sep 2022 08:13) (105/58 - 132/68)  BP(mean): 81 (22 Sep 2022 22:30) (77 - 94)  RR: 18 (23 Sep 2022 08:13) (12 - 18)  SpO2: 96% (23 Sep 2022 08:13) (94% - 100%)    Parameters below as of 23 Sep 2022 08:13  Patient On (Oxygen Delivery Method): room air        I&O's Summary    22 Sep 2022 07:01  -  23 Sep 2022 07:00  --------------------------------------------------------  IN: 1100 mL / OUT: 0 mL / NET: 1100 mL        Physical Exam:  General Appearance: Appears well, NAD, A& O x 3  Neck: Supple  Chest: Equal expansion bilaterally, equal breath sounds  CV: Pulse regular presently  Abdomen: Soft, ND, epigastric TTP  Extremities: Grossly symmetric, SCD's in place     LABS:                        8.1    6.13  )-----------( 97       ( 23 Sep 2022 07:14 )             26.7     09-23    134<L>  |  93<L>  |  28<H>  ----------------------------<  57<L>  4.6   |  23  |  6.78<H>    Ca    8.5      23 Sep 2022 07:08  Phos  5.7     09-23  Mg     2.1     09-23    TPro  6.9  /  Alb  3.8  /  TBili  3.3<H>  /  DBili  x   /  AST  37  /  ALT  47<H>  /  AlkPhos  101      PT/INR - ( 22 Sep 2022 11:10 )   PT: 12.9 sec;   INR: 1.12 ratio         PTT - ( 22 Sep 2022 11:10 )  PTT:30.2 sec  Urinalysis Basic - ( 22 Sep 2022 17:05 )    Color: Yellow / Appearance: Slightly Turbid / S.011 / pH: x  Gluc: x / Ketone: Negative  / Bili: Small / Urobili: Negative   Blood: x / Protein: 300 mg/dL / Nitrite: Negative   Leuk Esterase: Negative / RBC: 3 /hpf / WBC 8 /HPF   Sq Epi: x / Non Sq Epi: 9 /hpf / Bacteria: Moderate        RADIOLOGY & ADDITIONAL STUDIES:    < from: ERCP (22 @ 18:47) >  Impression:          - Type 1 isolated gastric varices (IGV1, variceslocated in the fundus),                        without bleeding.                       - ERCP revealed biliary ductal dilation s/p sphincterotomy. Sludge and stones                        was swept from the duct s/p placement of a 10 mm by 4 cm covered metal stent                        into the common bile duct.  Recommendation:      - Clear liquid diet.                       - Consider additional work up for splenomegaly and isolated gastric varices                        seen on endoscopy.                    - Replace stent if signs or symptoms of stent occlusion occur.                       - Surgical consultation for interval cholecystectomy.                       - Repeat ERCP in 2-3 months to remove stent (after cholecystectomy).                                                                                                        < end of copied text >

## 2022-09-23 NOTE — PROGRESS NOTE ADULT - ASSESSMENT
62F Uzbek speaking PMHx myelofibrosis polycythemia on HD (AVF on LUE, MWF), hx of portal HTN w/o cirrhosis presents with 2 months of abdominal pain worsening over last few days with associated nausea but no VD and no decrease in PO tolerance.   ED CT mild CBD dilatation up to 9 mm. Cholelithiasis and gallbladder distention without associated pericholecystic inflammation s/p ERCP on 9/22    Plan:  -DVT prophylaxis w/ SQH.   -Strict I&O's  -Analgesia and antiemetics as needed  -F/u am labs  -Diet: NPO  -Dispo: floor      [facility]   [id number]  ASSESSMENT/PLAN: 62F Irish speaking PMHx myelofibrosis polycythemia on HD (AVF on LUE, MWF), hx of portal HTN w/o cirrhosis presents with 2 months of abdominal pain worsening over last few days with associated nausea but no VD and no decrease in PO tolerance.  ED CT mild CBD dilatation up to 9 mm. Cholelithiasis and gallbladder distention without associated pericholecystic inflammation s/p ERCP with sphincterotomy and stent placement 9/22.      -DVT prophylaxis w/ SQH.   -Strict I&O's  -Analgesia and antiemetics as needed  -F/u am labs  -Diet: adv to clears      [facility]   [id number]

## 2022-09-23 NOTE — PROGRESS NOTE ADULT - SUBJECTIVE AND OBJECTIVE BOX
Gastroenterology/Hepatology Progress Note      Interval Events:   - yesterday patinet underwent ERCP, notable for CBD stone, s/p sphincterotomy, balloon sweep, and placement of 10mm x 40mm metal stent, tolerated well  - overnight ongoing abd pain     Allergies:  No Known Allergies      Hospital Medications:  acetaminophen     Tablet .. 650 milliGRAM(s) Oral every 6 hours PRN  calcium acetate 1334 milliGRAM(s) Oral three times a day with meals  danazol 200 milliGRAM(s) Oral three times a day  heparin   Injectable 5000 Unit(s) SubCutaneous every 8 hours  lactated ringers. 1000 milliLiter(s) IV Continuous <Continuous>  Nephro-deonna 1 Tablet(s) Oral daily  oxyCODONE    IR 5 milliGRAM(s) Oral every 4 hours PRN  oxyCODONE    IR 10 milliGRAM(s) Oral every 4 hours PRN  propranolol 10 milliGRAM(s) Oral daily      ROS: 14 point ROS negative unless otherwise state in subjective    PHYSICAL EXAM:   Vital Signs:  Vital Signs Last 24 Hrs  T(C): 36.6 (23 Sep 2022 08:13), Max: 36.8 (22 Sep 2022 16:25)  T(F): 97.8 (23 Sep 2022 08:13), Max: 98.3 (22 Sep 2022 16:25)  HR: 69 (23 Sep 2022 08:13) (67 - 84)  BP: 105/58 (23 Sep 2022 08:13) (105/58 - 132/68)  BP(mean): 81 (22 Sep 2022 22:30) (77 - 94)  RR: 18 (23 Sep 2022 08:13) (12 - 18)  SpO2: 96% (23 Sep 2022 08:13) (94% - 100%)    Parameters below as of 23 Sep 2022 08:13  Patient On (Oxygen Delivery Method): room air      Daily Height in cm: 157.48 (22 Sep 2022 19:40)    Daily     GENERAL:  No acute distress  HEENT:  NCAT, no scleral icterus  CHEST: no resp distress  HEART:  RRR  ABDOMEN:  ttp in epigastric region, no r/g  EXTREMITIES:  No cyanosis, clubbing, or edema  SKIN:  No rash/erythema/ecchymoses/petechiae/wounds/abscess/warm/dry  NEURO:  Alert and oriented x 3, no asterixis, no tremor    LABS:                        8.1    6.13  )-----------( 97       ( 23 Sep 2022 07:14 )             26.7     Mean Cell Volume: 101.5 fl (- @ 07:14)        134<L>  |  93<L>  |  28<H>  ----------------------------<  57<L>  4.6   |  23  |  6.78<H>    Ca    8.5      23 Sep 2022 07:08  Phos  5.7       Mg     2.1         TPro  6.9  /  Alb  3.8  /  TBili  3.3<H>  /  DBili  x   /  AST  37  /  ALT  47<H>  /  AlkPhos  101      LIVER FUNCTIONS - ( 23 Sep 2022 07:08 )  Alb: 3.8 g/dL / Pro: 6.9 g/dL / ALK PHOS: 101 U/L / ALT: 47 U/L / AST: 37 U/L / GGT: x           PT/INR - ( 22 Sep 2022 11:10 )   PT: 12.9 sec;   INR: 1.12 ratio         PTT - ( 22 Sep 2022 11:10 )  PTT:30.2 sec  Urinalysis Basic - ( 22 Sep 2022 17:05 )    Color: Yellow / Appearance: Slightly Turbid / S.011 / pH: x  Gluc: x / Ketone: Negative  / Bili: Small / Urobili: Negative   Blood: x / Protein: 300 mg/dL / Nitrite: Negative   Leuk Esterase: Negative / RBC: 3 /hpf / WBC 8 /HPF   Sq Epi: x / Non Sq Epi: 9 /hpf / Bacteria: Moderate      Amylase Serum--      Lipase [id number]       Ammonia--        Imaging:

## 2022-09-23 NOTE — PROGRESS NOTE ADULT - ASSESSMENT
62 year old female with PMH myelofibrosis, polycythemia vera, ESRD on HD via LUE AVF MWF, and portal HTN who presented with 2 months of abdominal pain due to possible choledocholithiasis.

## 2022-09-23 NOTE — PROGRESS NOTE ADULT - PROBLEM SELECTOR PLAN 1
-Patient presenting with abdominal pain and nausea found to have bilirubin of 6.7 with imaging showing CBD dilation to 9mm and, cholelithiasis and gallbladder distention without associated pericholecystic inflammation  -S/P ERCP, found to have CBD stone s/p sphincterotomy, balloon sweep and placement of a metal stent  -Bilirubin improved to 3.3 and transaminitis improving  -Follow up further GI recs

## 2022-09-23 NOTE — PROGRESS NOTE ADULT - SUBJECTIVE AND OBJECTIVE BOX
[facility] Division of Kidney Diseases & Hypertension  FOLLOW UP NOTE  [phone]--------------------------------------------------------------------------------  Chief Complaint:Abdominal pain    62 M Azeri speaking PMhx of myelofibrosis polycythemia on HD MWF, hx of protal HTN w/o cirrhosis here with abdominal pain, N/V found to have choledocholithiasis. Patient on HD via LUE AVF on [facility], follows with Dr. De León. Goes to [facility] to receive HD. Patient underwent ERCP sphincterotomy and balloon sweep, and placemnt of 10mm X40 mm metal stent with improvement in bili but course c/b post-ERCP pancreatitis.     24 hour events/subjective:  Patient s/p ERCP with now post-ERCP pancreatitis.       PAST HISTORY  --------------------------------------------------------------------------------  No significant changes to PMH, PSH, FHx, SHx, unless otherwise noted    ALLERGIES & MEDICATIONS  --------------------------------------------------------------------------------  Allergies    No Known Allergies    Intolerances      Standing Inpatient Medications  calcium acetate 1334 milliGRAM(s) Oral three times a day with meals  danazol 200 milliGRAM(s) Oral three times a day  heparin   Injectable 5000 Unit(s) SubCutaneous every 8 hours  lactated ringers. 1000 milliLiter(s) IV Continuous <Continuous>  Nephro-deonna 1 Tablet(s) Oral daily  propranolol 10 milliGRAM(s) Oral daily    PRN Inpatient Medications  acetaminophen     Tablet .. 650 milliGRAM(s) Oral every 6 hours PRN  oxyCODONE    IR 5 milliGRAM(s) Oral every 4 hours PRN  oxyCODONE    IR 10 milliGRAM(s) Oral every 4 hours PRN      REVIEW OF SYSTEMS  --------------------------------------------------------------------------------  Gen: No  fevers/chills  Skin: No rashes  Head/Eyes/Ears/Mouth: No headache; Normal hearing; Normal vision w/o blurriness  Respiratory: No dyspnea, cough, wheezing, hemoptysis  CV: No chest pain, PND, orthopnea  GI: + abdominal pain, No diarrhea, constipation, nausea, or vomiting  : No increased frequency, dysuria, hematuria, nocturia  MSK: No joint pain/swelling; no back pain; no edema  Neuro: No dizziness/lightheadedness, weakness, seizures, numbness, tingling    All other systems were reviewed and are negative, except as noted.    VITALS/PHYSICAL EXAM  --------------------------------------------------------------------------------  T(C): 36.8 (09-23-22 @ 12:37), Max: 36.8 (09-22-22 @ 16:25)  HR: 70 (09-23-22 @ 12:37) (67 - 84)  BP: 135/73 (09-23-22 @ 12:37) (105/58 - 135/73)  RR: 18 (09-23-22 @ 12:37) (12 - 18)  SpO2: 95% (09-23-22 @ 12:37) (94% - 100%)  Wt(kg): --  Height (cm): 157.5 (09-22-22 @ 19:40)  Weight (kg): 54 (09-22-22 @ 19:40)  BMI (kg/m2): 21.8 (09-22-22 @ 19:40)  BSA (m2): 1.53 (09-22-22 @ 19:40)      09-22-22 @ 07:01  -  09-23-22 @ 07:00  --------------------------------------------------------  IN: 1100 mL / OUT: 0 mL / NET: 1100 mL      Physical Exam:  	Gen: NAD  	HEENT: MMM  	Pulm: CTA B/L  	CV: S1S2  	Abd: Soft, +BS , RUQ tender to palpation  	Ext: No LE edema B/L  	Neuro: Awake  	Skin: Warm and dry  	Vascular access: LUE fistula no dilatation or skin thinning      LABS/STUDIES  --------------------------------------------------------------------------------              8.1    6.13  >-----------<  97       [09-23-22 @ 07:14]              26.7     134  |  93  |  28  ----------------------------<  57      [09-23-22 @ 07:08]  4.6   |  23  |  6.78        Ca     8.5     [09-23-22 @ 07:08]      Mg     2.1     [09-23-22 @ 07:08]      Phos  5.7     [09-23-22 @ 07:08]    TPro  6.9  /  Alb  3.8  /  TBili  3.3  /  DBili  x   /  AST  37  /  ALT  47  /  AlkPhos  101  [09-23-22 @ 07:08]    PT/INR: PT 12.9 , INR 1.12       [09-22-22 @ 11:10]  PTT: 30.2       [09-22-22 @ 11:10]      Creatinine Trend:  SCr 6.78 [09-23 @ 07:08]  SCr 3.89 [09-22 @ 00:21]    Urinalysis - [09-22-22 @ 17:05]      Color Yellow / Appearance Slightly Turbid / SG 1.011 / pH 8.5      Gluc 100 mg/dL / Ketone Negative  / Bili Small / Urobili Negative       Blood Negative / Protein 300 mg/dL / Leuk Est Negative / Nitrite Negative      RBC 3 / WBC 8 / Hyaline 9 / Gran  / Sq Epi  / Non Sq Epi 9 / Bacteria Moderate

## 2022-09-23 NOTE — PROGRESS NOTE ADULT - SUBJECTIVE AND OBJECTIVE BOX
[facility] Division of Hospital Medicine  Neymar Kate DO  Pager (SELMA, 9L-5P): [phone]  Other Times:  [phone]    Patient is a 62y old  Female who presents with a chief complaint of r/o choledocholithiasis (23 Sep 2022 10:12)    SUBJECTIVE / OVERNIGHT EVENTS: Yesterday patient underwent ERCP, found to have CBD stone s/p sphincterotomy, balloon sweep and placement of a metal stent. No acute events overnight. Patient seen and examined at bedside this morning, endorses abdominal pain. No nausea or vomiting currently.    REVIEW OF SYSTEMS:    CONSTITUTIONAL: No weakness, fevers or chills  EYES/ENT: No visual changes;  No vertigo or throat pain   NECK: No pain or stiffness  RESPIRATORY: No cough, wheezing, hemoptysis; No shortness of breath  CARDIOVASCULAR: No chest pain or palpitations  GASTROINTESTINAL: Endorses abdominal pain. No further nausea. No vomiting, or hematemesis; No diarrhea or constipation. No melena or hematochezia.  GENITOURINARY: No dysuria, frequency or hematuria  NEUROLOGICAL: No numbness or weakness  SKIN: No itching, burning, rashes, or lesions  MSK: No joint pain, no back pain  HEME: No easy bleeding, no easy bruising  All other review of systems is negative unless indicated above.    MEDICATIONS  (STANDING):  calcium acetate 1334 milliGRAM(s) Oral three times a day with meals  danazol 200 milliGRAM(s) Oral three times a day  heparin   Injectable 5000 Unit(s) SubCutaneous every 8 hours  lactated ringers. 1000 milliLiter(s) (100 mL/Hr) IV Continuous <Continuous>  Nephro-deonna 1 Tablet(s) Oral daily  propranolol 10 milliGRAM(s) Oral daily    MEDICATIONS  (PRN):  acetaminophen     Tablet .. 650 milliGRAM(s) Oral every 6 hours PRN Mild Pain (1 - 3)  oxyCODONE    IR 5 milliGRAM(s) Oral every 4 hours PRN Moderate Pain (4 - 6)  oxyCODONE    IR 10 milliGRAM(s) Oral every 4 hours PRN Severe Pain (7 - 10)      CAPILLARY BLOOD GLUCOSE        I&O's Summary    22 Sep 2022 07:01  -  23 Sep 2022 07:00  --------------------------------------------------------  IN: 1100 mL / OUT: 0 mL / NET: 1100 mL        PHYSICAL EXAM:  Vital Signs Last 24 Hrs  T(C): 36.6 (23 Sep 2022 08:13), Max: 36.8 (22 Sep 2022 16:25)  T(F): 97.8 (23 Sep 2022 08:13), Max: 98.3 (22 Sep 2022 16:25)  HR: 69 (23 Sep 2022 08:13) (67 - 84)  BP: 105/58 (23 Sep 2022 08:13) (105/58 - 132/68)  BP(mean): 81 (22 Sep 2022 22:30) (77 - 94)  RR: 18 (23 Sep 2022 08:13) (12 - 18)  SpO2: 96% (23 Sep 2022 08:13) (94% - 100%)    Parameters below as of 23 Sep 2022 08:13  Patient On (Oxygen Delivery Method): room air    CONSTITUTIONAL: Well-groomed, in no apparent distress  EYES: No conjunctival or scleral injection, non-icteric  ENMT: No external nasal lesions; oral mucosa with moist membranes  RESPIRATORY: Breathing comfortably; lungs CTA without wheeze/rhonchi/rales  CARDIOVASCULAR: +S1S2, RRR, no M/G/R;  GASTROINTESTINAL: Soft, tender to palpation in epigastric region, no rebound/guarding  MUSCULOSKELETAL: No digital clubbing or cyanosis;   SKIN: No rashes or ulcers noted; LUE AVF with +thrill, +port in right upper chest wall  NEUROLOGIC: CN II-XII intact; normal reflexes in upper and lower extremities; sensation intact in LEs b/l to light touch  PSYCHIATRIC: A+O x 3; mood and affect appropriate; appropriate insight and judgment    LABS:                        8.1    6.13  )-----------( 97       ( 23 Sep 2022 07:14 )             26.7     09-23    134<L>  |  93<L>  |  28<H>  ----------------------------<  57<L>  4.6   |  23  |  6.78<H>    Ca    8.5      23 Sep 2022 07:08  Phos  5.7       Mg     2.1         TPro  6.9  /  Alb  3.8  /  TBili  3.3<H>  /  DBili  x   /  AST  37  /  ALT  47<H>  /  AlkPhos  101      PT/INR - ( 22 Sep 2022 11:10 )   PT: 12.9 sec;   INR: 1.12 ratio         PTT - ( 22 Sep 2022 11:10 )  PTT:30.2 sec      Urinalysis Basic - ( 22 Sep 2022 17:05 )    Color: Yellow / Appearance: Slightly Turbid / S.011 / pH: x  Gluc: x / Ketone: Negative  / Bili: Small / Urobili: Negative   Blood: x / Protein: 300 mg/dL / Nitrite: Negative   Leuk Esterase: Negative / RBC: 3 /hpf / WBC 8 /HPF   Sq Epi: x / Non Sq Epi: 9 /hpf / Bacteria: Moderate        Culture - Blood (collected 22 Sep 2022 00:10)  Source: .Blood Blood-Peripheral  Preliminary Report (23 Sep 2022 03:01):    No growth to date.    Culture - Blood (collected 22 Sep 2022 00:05)  Source: .Blood Blood-Peripheral  Preliminary Report (23 Sep 2022 03:01):    No growth to date.

## 2022-09-23 NOTE — CHART NOTE - NSCHARTNOTEFT_GEN_A_CORE
General Surgery Post Procedure Check    Pt seen and examined without complaints. Pain is controlled. Denies SOB/CP/N/V.     Vital Signs Last 24 Hrs  T(C): 36.7 (23 Sep 2022 01:15), Max: 36.8 (22 Sep 2022 05:32)  T(F): 98.1 (23 Sep 2022 01:15), Max: 98.3 (22 Sep 2022 08:13)  HR: 75 (23 Sep 2022 01:15) (67 - 84)  BP: 117/68 (23 Sep 2022 01:15) (103/67 - 132/68)  BP(mean): 81 (22 Sep 2022 22:30) (77 - 94)  RR: 18 (23 Sep 2022 01:15) (12 - 18)  SpO2: 95% (23 Sep 2022 01:15) (94% - 100%)    Parameters below as of 23 Sep 2022 01:15  Patient On (Oxygen Delivery Method): room air        I&O's Summary    22 Sep 2022 07:01  -  23 Sep 2022 02:17  --------------------------------------------------------  IN: 300 mL / OUT: 0 mL / NET: 300 mL        Physical Exam  Gen: NAD, A&Ox3  Pulm: No respiratory distress, no subcostal retractions  CV: RRR, no JVD  Abd: Soft, Tender to palpation in epigastrium, ND  Extremities:  FROM, warm and well perfused, equal bilateral muscle strength      A/P: 62y Female s/p ERCP  -DVT prophylaxis w/ SQH.   -Strict I&O's  -Analgesia and antiemetics as needed  -F/u am labs  -Diet: NPO  -Dispo: floor    [facility]ail, PGY-1

## 2022-09-23 NOTE — PROGRESS NOTE ADULT - PROBLEM SELECTOR PLAN 5
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 De León at [facility]  -Renal consulted, follow up recs  -For HD today 9/23  -Continue Nephrovite daily  -Continue calcium acetate 1334mg TID with meals  -Renal diet

## 2022-09-23 NOTE — PROGRESS NOTE ADULT - PROBLEM SELECTOR PLAN 3
Phosphorus 5.7  c/w phosphorus binder  Please ensure low phosphorus diet and obtain phos and calcium with each bmp    If you have any questions, please feel free to contact me  Keshawn Farfan  Nephrology Fellow  [phone]; Prefer [employer] TEAMS  (After 5pm or on weekends please page the on-call fellow).    Patient was discussed with Dr. Stewart who agrees with my A/P. Addendum to follow
-Patient with a history of polycythemia vera and follows with Dr. Jacky Guo  -Heme called, hold Jakafi 10mg TIW with HD for now

## 2022-09-23 NOTE — PROGRESS NOTE ADULT - PROBLEM SELECTOR PLAN 2
Hgb is 8.1, below goal  [] Will check outpatient HD recs to determine epo dose.
-Patient with abdominal pain post ERCP with lipase 359 concerning for post ERCP pancreatitis  -GI recommending increase in fluids from 100cc/hour to 200cc/hour  -Would be judicious with fluid use due to ESRD

## 2022-09-23 NOTE — PROGRESS NOTE ADULT - ASSESSMENT
62 M Belarusian speaking PMhx of myelofibrosis polycythemia on HD MWF, hx of protal HTN w/o cirrhosis here with abdominal pain, N/V found to have choledocholithiasis. Patient on HD via LUE AVF on MWF, follows with Dr. De León. Goes to [facility] to receive HD. Patient underwent ERCP sphincterotomy and balloon sweep, and placement of 10mm X40 mm metal stent with improvement in bili but course c/b post-ERCP pancreatitis.

## 2022-09-23 NOTE — PROGRESS NOTE ADULT - PROBLEM SELECTOR PLAN 1
ESRD On HD MWF using LUE fistula.     [] Plan for HD today  [] UA done on admission as patient complaining of right flank pain, showed 300 protein, 8 wbc but no LE

## 2022-09-23 NOTE — PROGRESS NOTE ADULT - ASSESSMENT
62F Bulgarian speaking PMHx myelofibrosis polycythemia on HD (AVF on LUE, MWF), hx of portal HTN w/o cirrhosis presents with acute on chronic abd pain, found to have T bili 6.7, CT showing CBD 9mm, s/p EUS/ERCP 9/22 w/ CBD stone, sphincterotomy and balloon sweep, and placement of 10mm x 40mm metal stent w/ improvement in bili (6.7->3.3), course c/b by new epigastric pain c/f post-ERCP pancreatitis (lipase 334)    # CBD stone  # gastric varices - noted on EGD/EUS 9/22/22  # post-ERCP pancreatitis     Recommendations:  - increase IVF to 200cc/hr, monitor for s/s volume overload  - trend liver enzymes  - consider further w/u for chronic liver disease and portal HTN given thrombocytopenia, splenomegaly, and gastric varices seen on EGD  - clear liquid diet  - rest of care per primary team    GI will continue to follow.     All recommendations are tentative until note is attested by attending.     Sha Mercado, PGY6  Gastroenterology/Hepatology Fellow  During weekdays: Available on [employer] Teams or pager:[phone] ([facility] Short Range Pager); [phone] (Long Range Pager)  Overnight/Weekend: Please page the on-call GI fellow

## 2022-09-23 NOTE — PROGRESS NOTE ADULT - ATTENDING COMMENTS
seen and examined 09-23-22 @ 1215    tolerating clears  +nausea / no vomiting  c/o epigastric pain radiating to the back    soft / mild epigastric tenderness / ND  old PD catheters cars  no jaundice    WBC = 6  T bili = 6.7 -> 3.3  lipase - 359      9/22/2022 - ERCP / biliary sphincterotomy / CBD stone removal / fully-covered metal stent placement for choledocholithiasis  -continue IVF for post-ERCP pancreatitits  -given cirrhosis and other severe medical comorbidities, the risks of cholecystectomy strongly outweighs the benefit    ESRD on HD MWF  -HD today  -judicious IV fluids      I reviewed her labs. seen and examined 09-23-22 @ 1215    tolerating clears  +nausea / no vomiting  c/o epigastric pain radiating to the back    soft / mild epigastric tenderness / ND  old PD catheters cars  no jaundice    WBC = 6  T bili = 6.7 -> 3.3  lipase - 359      9/22/2022 - ERCP / biliary sphincterotomy / CBD stone removal / fully-covered metal stent placement for choledocholithiasis  -continue IVF for post-ERCP pancreatitis  -given cirrhosis and other severe medical comorbidities, the risks of cholecystectomy strongly outweighs the benefit    ESRD on HD MWF  -HD today  -judicious IV fluids      I reviewed her labs. seen and examined 09-23-22 @ 1215    tolerating clears  +nausea / no vomiting  c/o epigastric pain radiating to the back    soft / mild epigastric tenderness / ND  old PD catheters cars  no jaundice    WBC = 6  T bili = 6.7 -> 3.3  lipase - 359      9/22/2022 - ERCP / biliary sphincterotomy / CBD stone removal / fully-covered metal stent placement for choledocholithiasis  -continue IVF for post-ERCP pancreatitis  -given cirrhosis and other severe medical comorbidities, the risks of cholecystectomy strongly outweighs the benefit    post-ERCP pancreatitis  ESRD on HD MWF  -HD today  -judicious IV fluids      I reviewed her labs.

## 2022-09-23 NOTE — PROGRESS NOTE ADULT - ATTENDING COMMENTS
As above.    Pt not in her room at time of my visit in mid-afternoon.    Although pt has post-ERCP pancreatitis, fluid management per medicine and nephrology given ESRD on hemodialysis.

## 2022-09-24 LAB
ALBUMIN SERPL ELPH-MCNC: 3.8 G/DL — SIGNIFICANT CHANGE UP (ref 3.3–5)
ALP SERPL-CCNC: 90 U/L — SIGNIFICANT CHANGE UP (ref 40–120)
ALT FLD-CCNC: 33 U/L — SIGNIFICANT CHANGE UP (ref 10–45)
ANION GAP SERPL CALC-SCNC: 14 MMOL/L — SIGNIFICANT CHANGE UP (ref 5–17)
AST SERPL-CCNC: 23 U/L — SIGNIFICANT CHANGE UP (ref 10–40)
BILIRUB SERPL-MCNC: 2.2 MG/DL — HIGH (ref 0.2–1.2)
BUN SERPL-MCNC: 14 MG/DL — SIGNIFICANT CHANGE UP (ref 7–23)
CALCIUM SERPL-MCNC: 8.8 MG/DL — SIGNIFICANT CHANGE UP (ref 8.4–10.5)
CHLORIDE SERPL-SCNC: 91 MMOL/L — LOW (ref 96–108)
CO2 SERPL-SCNC: 27 MMOL/L — SIGNIFICANT CHANGE UP (ref 22–31)
CREAT SERPL-MCNC: 4.91 MG/DL — HIGH (ref 0.5–1.3)
CULTURE RESULTS: SIGNIFICANT CHANGE UP
EGFR: 10 ML/MIN/1.73M2 — LOW
GLUCOSE SERPL-MCNC: 99 MG/DL — SIGNIFICANT CHANGE UP (ref 70–99)
HCT VFR BLD CALC: 24.9 % — LOW (ref 34.5–45)
HGB BLD-MCNC: 7.8 G/DL — LOW (ref 11.5–15.5)
MAGNESIUM SERPL-MCNC: 1.8 MG/DL — SIGNIFICANT CHANGE UP (ref 1.6–2.6)
MCHC RBC-ENTMCNC: 30.7 PG — SIGNIFICANT CHANGE UP (ref 27–34)
MCHC RBC-ENTMCNC: 31.3 GM/DL — LOW (ref 32–36)
MCV RBC AUTO: 98 FL — SIGNIFICANT CHANGE UP (ref 80–100)
NRBC # BLD: 1 /100 WBCS — HIGH (ref 0–0)
PHOSPHATE SERPL-MCNC: 3.7 MG/DL — SIGNIFICANT CHANGE UP (ref 2.5–4.5)
PLATELET # BLD AUTO: 110 K/UL — LOW (ref 150–400)
POTASSIUM SERPL-MCNC: 4.2 MMOL/L — SIGNIFICANT CHANGE UP (ref 3.5–5.3)
POTASSIUM SERPL-SCNC: 4.2 MMOL/L — SIGNIFICANT CHANGE UP (ref 3.5–5.3)
PROT SERPL-MCNC: 6.8 G/DL — SIGNIFICANT CHANGE UP (ref 6–8.3)
RBC # BLD: 2.54 M/UL — LOW (ref 3.8–5.2)
RBC # FLD: 17.2 % — HIGH (ref 10.3–14.5)
SODIUM SERPL-SCNC: 132 MMOL/L — LOW (ref 135–145)
SPECIMEN SOURCE: SIGNIFICANT CHANGE UP
WBC # BLD: 5.92 K/UL — SIGNIFICANT CHANGE UP (ref 3.8–10.5)
WBC # FLD AUTO: 5.92 K/UL — SIGNIFICANT CHANGE UP (ref 3.8–10.5)

## 2022-09-24 PROCEDURE — 99233 SBSQ HOSP IP/OBS HIGH 50: CPT

## 2022-09-24 PROCEDURE — 99232 SBSQ HOSP IP/OBS MODERATE 35: CPT | Mod: GC

## 2022-09-24 RX ORDER — CHLORHEXIDINE GLUCONATE 213 G/1000ML
1 SOLUTION TOPICAL DAILY
Refills: 0 | Status: DISCONTINUED | OUTPATIENT
Start: 2022-09-24 | End: 2022-09-25

## 2022-09-24 RX ORDER — ACETAMINOPHEN 500 MG
650 TABLET ORAL EVERY 6 HOURS
Refills: 0 | Status: DISCONTINUED | OUTPATIENT
Start: 2022-09-24 | End: 2022-09-25

## 2022-09-24 RX ORDER — ONDANSETRON 8 MG/1
4 TABLET, FILM COATED ORAL ONCE
Refills: 0 | Status: DISCONTINUED | OUTPATIENT
Start: 2022-09-24 | End: 2022-09-25

## 2022-09-24 RX ORDER — PANTOPRAZOLE SODIUM 20 MG/1
40 TABLET, DELAYED RELEASE ORAL
Refills: 0 | Status: DISCONTINUED | OUTPATIENT
Start: 2022-09-24 | End: 2022-09-25

## 2022-09-24 RX ADMIN — HEPARIN SODIUM 5000 UNIT(S): 5000 INJECTION INTRAVENOUS; SUBCUTANEOUS at 14:33

## 2022-09-24 RX ADMIN — OXYCODONE HYDROCHLORIDE 10 MILLIGRAM(S): 5 TABLET ORAL at 08:25

## 2022-09-24 RX ADMIN — HEPARIN SODIUM 5000 UNIT(S): 5000 INJECTION INTRAVENOUS; SUBCUTANEOUS at 21:08

## 2022-09-24 RX ADMIN — OXYCODONE HYDROCHLORIDE 10 MILLIGRAM(S): 5 TABLET ORAL at 00:33

## 2022-09-24 RX ADMIN — CHLORHEXIDINE GLUCONATE 1 APPLICATION(S): 213 SOLUTION TOPICAL at 11:54

## 2022-09-24 RX ADMIN — HEPARIN SODIUM 5000 UNIT(S): 5000 INJECTION INTRAVENOUS; SUBCUTANEOUS at 05:38

## 2022-09-24 RX ADMIN — OXYCODONE HYDROCHLORIDE 10 MILLIGRAM(S): 5 TABLET ORAL at 01:30

## 2022-09-24 RX ADMIN — OXYCODONE HYDROCHLORIDE 10 MILLIGRAM(S): 5 TABLET ORAL at 07:55

## 2022-09-24 RX ADMIN — SODIUM CHLORIDE 75 MILLILITER(S): 9 INJECTION, SOLUTION INTRAVENOUS at 11:52

## 2022-09-24 RX ADMIN — Medication 1 TABLET(S): at 11:52

## 2022-09-24 RX ADMIN — DANAZOL 200 MILLIGRAM(S): 200 CAPSULE ORAL at 14:33

## 2022-09-24 RX ADMIN — Medication 650 MILLIGRAM(S): at 21:30

## 2022-09-24 RX ADMIN — PANTOPRAZOLE SODIUM 40 MILLIGRAM(S): 20 TABLET, DELAYED RELEASE ORAL at 13:16

## 2022-09-24 RX ADMIN — DANAZOL 200 MILLIGRAM(S): 200 CAPSULE ORAL at 21:08

## 2022-09-24 RX ADMIN — DANAZOL 200 MILLIGRAM(S): 200 CAPSULE ORAL at 05:38

## 2022-09-24 RX ADMIN — Medication 1334 MILLIGRAM(S): at 07:55

## 2022-09-24 RX ADMIN — Medication 650 MILLIGRAM(S): at 21:09

## 2022-09-24 NOTE — PROGRESS NOTE ADULT - SUBJECTIVE AND OBJECTIVE BOX
Surgery Progress Note      SUBJECTIVE: Patient seen and examined at bedside with surgical team. No acute events overnight.     OBJECTIVE:    Vital Signs Last 24 Hrs  T(C): 37.7 (24 Sep 2022 05:16), Max: 37.7 (24 Sep 2022 05:16)  T(F): 99.9 (24 Sep 2022 05:16), Max: 99.9 (24 Sep 2022 05:16)  HR: 92 (24 Sep 2022 05:16) (65 - 92)  BP: 120/59 (24 Sep 2022 05:16) (105/58 - 135/73)  BP(mean): --  RR: 18 (24 Sep 2022 05:16) (18 - 18)  SpO2: 92% (24 Sep 2022 05:16) (92% - 100%)    Parameters below as of 24 Sep 2022 05:16  Patient On (Oxygen Delivery Method): room air    I&O's Detail    23 Sep 2022 07:01  -  24 Sep 2022 07:00  --------------------------------------------------------  IN:    Lactated Ringers: 550 mL    Lactated Ringers: 1088 mL    Oral Fluid: 720 mL  Total IN: 2358 mL    OUT:    Other (mL): 1500 mL    Voided (mL): 0 mL  Total OUT: 1500 mL    Total NET: 858 mL      MEDICATIONS  (STANDING):  calcium acetate 1334 milliGRAM(s) Oral three times a day with meals  danazol 200 milliGRAM(s) Oral three times a day  heparin   Injectable 5000 Unit(s) SubCutaneous every 8 hours  lactated ringers. 1000 milliLiter(s) (75 mL/Hr) IV Continuous <Continuous>  Nephro-deonna 1 Tablet(s) Oral daily  propranolol 10 milliGRAM(s) Oral daily    MEDICATIONS  (PRN):  acetaminophen     Tablet .. 650 milliGRAM(s) Oral every 6 hours PRN Mild Pain (1 - 3)  oxyCODONE    IR 5 milliGRAM(s) Oral every 4 hours PRN Moderate Pain (4 - 6)  oxyCODONE    IR 10 milliGRAM(s) Oral every 4 hours PRN Severe Pain (7 - 10)      PHYSICAL EXAM:  Constitutional: A&Ox3, NAD  Respiratory: Unlabored breathing  Abdomen: Soft, nondistended, nontender. No rebound or guarding.  Extremities: WWP, RAHMAN spontaneously    LABS:                        7.8    5.92  )-----------( 110      ( 24 Sep 2022 06:44 )             24.9     09-23    134<L>  |  93<L>  |  28<H>  ----------------------------<  57<L>  4.6   |  23  |  6.78<H>    Ca    8.5      23 Sep 2022 07:08  Phos  5.7       Mg     2.1         TPro  6.9  /  Alb  3.8  /  TBili  3.3<H>  /  DBili  x   /  AST  37  /  ALT  47<H>  /  AlkPhos  101  09-    PT/INR - ( 22 Sep 2022 11:10 )   PT: 12.9 sec;   INR: 1.12 ratio         PTT - ( 22 Sep 2022 11:10 )  PTT:30.2 sec  LIVER FUNCTIONS - ( 23 Sep 2022 07:08 )  Alb: 3.8 g/dL / Pro: 6.9 g/dL / ALK PHOS: 101 U/L / ALT: 47 U/L / AST: 37 U/L / GGT: x           Urinalysis Basic - ( 22 Sep 2022 17:05 )    Color: Yellow / Appearance: Slightly Turbid / S.011 / pH: x  Gluc: x / Ketone: Negative  / Bili: Small / Urobili: Negative   Blood: x / Protein: 300 mg/dL / Nitrite: Negative   Leuk Esterase: Negative / RBC: 3 /hpf / WBC 8 /HPF   Sq Epi: x / Non Sq Epi: 9 /hpf / Bacteria: Moderate           Surgery Progress Note      SUBJECTIVE: Patient seen and examined at bedside with surgical team. No acute events overnight. Patient reports gas pain and epigastric pain. Tolerating clears. Endorses nausea, no vomiting. Denies fevers, chills.    OBJECTIVE:    Vital Signs Last 24 Hrs  T(C): 37.7 (24 Sep 2022 05:16), Max: 37.7 (24 Sep 2022 05:16)  T(F): 99.9 (24 Sep 2022 05:16), Max: 99.9 (24 Sep 2022 05:16)  HR: 92 (24 Sep 2022 05:16) (65 - 92)  BP: 120/59 (24 Sep 2022 05:16) (105/58 - 135/73)  BP(mean): --  RR: 18 (24 Sep 2022 05:16) (18 - 18)  SpO2: 92% (24 Sep 2022 05:16) (92% - 100%)    Parameters below as of 24 Sep 2022 05:16  Patient On (Oxygen Delivery Method): room air    I&O's Detail    23 Sep 2022 07:01  -  24 Sep 2022 07:00  --------------------------------------------------------  IN:    Lactated Ringers: 550 mL    Lactated Ringers: 1088 mL    Oral Fluid: 720 mL  Total IN: 2358 mL    OUT:    Other (mL): 1500 mL    Voided (mL): 0 mL  Total OUT: 1500 mL    Total NET: 858 mL      MEDICATIONS  (STANDING):  calcium acetate 1334 milliGRAM(s) Oral three times a day with meals  danazol 200 milliGRAM(s) Oral three times a day  heparin   Injectable 5000 Unit(s) SubCutaneous every 8 hours  lactated ringers. 1000 milliLiter(s) (75 mL/Hr) IV Continuous <Continuous>  Nephro-deonna 1 Tablet(s) Oral daily  propranolol 10 milliGRAM(s) Oral daily    MEDICATIONS  (PRN):  acetaminophen     Tablet .. 650 milliGRAM(s) Oral every 6 hours PRN Mild Pain (1 - 3)  oxyCODONE    IR 5 milliGRAM(s) Oral every 4 hours PRN Moderate Pain (4 - 6)  oxyCODONE    IR 10 milliGRAM(s) Oral every 4 hours PRN Severe Pain (7 - 10)      PHYSICAL EXAM:  Constitutional: A&Ox3, NAD  Respiratory: Unlabored breathing  Abdomen: Soft, nondistended, epigastric tenderness  Extremities: WWP, RAHMAN spontaneously    LABS:                        7.8    5.92  )-----------( 110      ( 24 Sep 2022 06:44 )             24.9     -    134<L>  |  93<L>  |  28<H>  ----------------------------<  57<L>  4.6   |  23  |  6.78<H>    Ca    8.5      23 Sep 2022 07:08  Phos  5.7       Mg     2.1         TPro  6.9  /  Alb  3.8  /  TBili  3.3<H>  /  DBili  x   /  AST  37  /  ALT  47<H>  /  AlkPhos  101  09-    PT/INR - ( 22 Sep 2022 11:10 )   PT: 12.9 sec;   INR: 1.12 ratio         PTT - ( 22 Sep 2022 11:10 )  PTT:30.2 sec  LIVER FUNCTIONS - ( 23 Sep 2022 07:08 )  Alb: 3.8 g/dL / Pro: 6.9 g/dL / ALK PHOS: 101 U/L / ALT: 47 U/L / AST: 37 U/L / GGT: x           Urinalysis Basic - ( 22 Sep 2022 17:05 )    Color: Yellow / Appearance: Slightly Turbid / S.011 / pH: x  Gluc: x / Ketone: Negative  / Bili: Small / Urobili: Negative   Blood: x / Protein: 300 mg/dL / Nitrite: Negative   Leuk Esterase: Negative / RBC: 3 /hpf / WBC 8 /HPF   Sq Epi: x / Non Sq Epi: 9 /hpf / Bacteria: Moderate

## 2022-09-24 NOTE — PROGRESS NOTE ADULT - ATTENDING COMMENTS
seen and examined 09-24-22 @ 1421    not tolerating clears  +belching  +nausea / +vomiting  epigastric pain is improivng    soft / mild epigastric tenderness / ND  old PD catheters cars  no jaundice    WBC = 5  T bili = 6.7 -> 3.3 -> 2.2      9/22/2022 - ERCP / biliary sphincterotomy / CBD stone removal / fully-covered metal stent placement for choledocholithiasis  -given cirrhosis and other severe medical comorbidities, the risks of cholecystectomy strongly outweighs the benefit    ESRD on HD MWF  -stop IV fluids so that she will not require emergent HD this weekend      I reviewed her labs. seen and examined 09-24-22 @ 1421    not tolerating clears  +belching  +nausea / +vomiting  epigastric pain is improving    soft / mild epigastric tenderness / ND  old PD catheters cars  no jaundice    WBC = 5  T bili = 6.7 -> 3.3 -> 2.2      9/22/2022 - ERCP / biliary sphincterotomy / CBD stone removal / fully-covered metal stent placement for choledocholithiasis  -given cirrhosis and other severe medical comorbidities, the risks of cholecystectomy strongly outweighs the benefit    post-ERCP pancreatitis  ESRD on HD MWF  -stop IV fluids so that she will not require emergent HD this weekend      I reviewed her labs.

## 2022-09-24 NOTE — PROGRESS NOTE ADULT - ASSESSMENT
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 (AVF on LUE, MWF), hx of portal HTN w/o cirrhosis presents with acute on chronic abd pain, found to have T bili 6.7, CT showing CBD 9mm, s/p EUS/ERCP 9/22 w/ CBD stone, sphincterotomy and balloon sweep, and placement of 10mm x 40mm metal stent w/ improvement in bili (6.7->3.3), course c/b by new epigastric pain c/f post-ERCP pancreatitis (lipase 334)    # CBD stone  # gastric varices - noted on EGD/EUS 9/22/22  # post-ERCP pancreatitis     Recommendations:  - clear liquid diet, advance as tolerated   - increase IVF to 200cc/hr, monitor for s/s volume overload  - trend liver enzymes  - patient will require further outpatient w/u for chronic liver disease and portal HTN given thrombocytopenia, splenomegaly, and gastric varices seen on EGD  - rest of care per primary team    All recommendations are tentative until note is attested by attending.     Michela Oliveira, PGY-4   Gastroenterology/Hepatology Fellow  Available on Microsoft Teams  [phone] ([facility] Short Range Pager)  [phone] ([facility] Long Range Pager)    After 5pm, please contact the on-call GI fellow. 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 (AVF on LUE, MWF), hx of portal HTN w/o cirrhosis presents with acute on chronic abd pain, found to have T bili 6.7, CT showing CBD 9mm, s/p EUS/ERCP 9/22 w/ CBD stone, sphincterotomy and balloon sweep, and placement of 10mm x 40mm metal stent w/ improvement in bili (6.7->3.3), course c/b by new epigastric pain c/f post-ERCP pancreatitis (lipase 334)    # CBD stone  # gastric varices - noted on EGD/EUS 9/22/22  # post-ERCP pancreatitis     Recommendations:  - clear liquid diet, advance as tolerated to regular, low fat diet   - decrease IVF to 75cc/hr, monitor for s/s volume overload, can discontinue when patient tolerating regular, low fat diet   - trend liver enzymes  - patient will require further outpatient w/u for chronic liver disease and portal HTN given thrombocytopenia, splenomegaly, and gastric varices seen on EGD  - Please provide patient with Hepatology Clinic outpatient follow up number for an appointment; [phone] ([facility]) or [phone] ([facility] at [address] [place] [address])   - rest of care per primary team    All recommendations are tentative until note is attested by attending.     Michela Oliveira, PGY-4   Gastroenterology/Hepatology Fellow  Available on Microsoft Teams  [phone] ([facility] Short Range Pager)  [phone] ([facility] Long Range Pager)    After 5pm, please contact the on-call GI fellow. [phone]

## 2022-09-24 NOTE — PROGRESS NOTE ADULT - SUBJECTIVE AND OBJECTIVE BOX
Gastroenterology/Hepatology Progress Note    Interval Events:   - patient reports feeling hungry, having an area of gas in the right upper quadrant   - reports no pain with CLD, would like to advance to regular food     Allergies:  No Known Allergies      Hospital Medications:  acetaminophen     Tablet .. 650 milliGRAM(s) Oral every 6 hours PRN  calcium acetate 1334 milliGRAM(s) Oral three times a day with meals  danazol 200 milliGRAM(s) Oral three times a day  heparin   Injectable 5000 Unit(s) SubCutaneous every 8 hours  lactated ringers. 1000 milliLiter(s) IV Continuous <Continuous>  Nephro-deonna 1 Tablet(s) Oral daily  oxyCODONE    IR 5 milliGRAM(s) Oral every 4 hours PRN  oxyCODONE    IR 10 milliGRAM(s) Oral every 4 hours PRN  propranolol 10 milliGRAM(s) Oral daily      ROS: 14 point ROS negative unless otherwise state in subjective    PHYSICAL EXAM:   Vital Signs:  Vital Signs Last 24 Hrs  T(C): 37.7 (24 Sep 2022 05:16), Max: 37.7 (24 Sep 2022 05:16)  T(F): 99.9 (24 Sep 2022 05:16), Max: 99.9 (24 Sep 2022 05:16)  HR: 92 (24 Sep 2022 05:16) (65 - 92)  BP: 120/59 (24 Sep 2022 05:16) (116/74 - 135/73)  BP(mean): --  RR: 18 (24 Sep 2022 05:16) (18 - 18)  SpO2: 92% (24 Sep 2022 05:16) (92% - 100%)    Parameters below as of 24 Sep 2022 05:16  Patient On (Oxygen Delivery Method): room air      Daily     Daily Weight in k.2 (23 Sep 2022 16:56)    GENERAL:  No acute distress, patient appears comfortable   HEENT:  NCAT, no scleral icterus  CHEST: no resp distress  HEART:  RRR  ABDOMEN:  Soft, non-tender, non-distended, normoactive bowel sounds, no masses  EXTREMITIES:  No cyanosis, clubbing, or edema  SKIN:  No rash/erythema/ecchymoses/petechiae/wounds/abscess/warm/dry  NEURO:  Alert and oriented x 3, no tremor    LABS:                        7.8    5.92  )-----------( 110      ( 24 Sep 2022 06:44 )             24.9     Mean Cell Volume: 98.0 fl (-22 @ 06:44)        132<L>  |  91<L>  |  14  ----------------------------<  99  4.2   |  27  |  4.91<H>    Ca    8.8      24 Sep 2022 06:46  Phos  3.7       Mg     1.8         TPro  6.8  /  Alb  3.8  /  TBili  2.2<H>  /  DBili  x   /  AST  23  /  ALT  33  /  AlkPhos  90      LIVER FUNCTIONS - ( 24 Sep 2022 06:46 )  Alb: 3.8 g/dL / Pro: 6.8 g/dL / ALK PHOS: 90 U/L / ALT: 33 U/L / AST: 23 U/L / GGT: x           PT/INR - ( 22 Sep 2022 11:10 )   PT: 12.9 sec;   INR: 1.12 ratio         PTT - ( 22 Sep 2022 11:10 )  PTT:30.2 sec  Urinalysis Basic - ( 22 Sep 2022 17:05 )    Color: Yellow / Appearance: Slightly Turbid / S.011 / pH: x  Gluc: x / Ketone: Negative  / Bili: Small / Urobili: Negative   Blood: x / Protein: 300 mg/dL / Nitrite: Negative   Leuk Esterase: Negative / RBC: 3 /hpf / WBC 8 /HPF   Sq Epi: x / Non Sq Epi: 9 /hpf / Bacteria: Moderate            Imaging:

## 2022-09-24 NOTE — PROGRESS NOTE ADULT - ASSESSMENT
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  ED CT mild CBD dilatation up to 9 mm. Cholelithiasis and gallbladder distention without associated pericholecystic inflammation s/p ERCP with sphincterotomy and stent placement 9/22.      -DVT prophylaxis w/ SQH.   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  ED CT mild CBD dilatation up to 9 mm. Cholelithiasis and gallbladder distention without associated pericholecystic inflammation s/p ERCP with sphincterotomy and stent placement 9/22. Now w/ post-ERCP pancreatitis    Plan:  -DVT prophylaxis w/ SQH.   -Strict I&O's  -Analgesia and antiemetics as needed  -F/u am labs  -continue CLD, possible advance in PM if tolerating      ATP Surgery   [id number]

## 2022-09-25 ENCOUNTER — TRANSCRIPTION ENCOUNTER (OUTPATIENT)
Age: 62
End: 2022-09-25

## 2022-09-25 VITALS
TEMPERATURE: 98 F | OXYGEN SATURATION: 94 % | RESPIRATION RATE: 18 BRPM | HEART RATE: 73 BPM | DIASTOLIC BLOOD PRESSURE: 60 MMHG | SYSTOLIC BLOOD PRESSURE: 106 MMHG

## 2022-09-25 LAB
ALBUMIN SERPL ELPH-MCNC: 3.5 G/DL — SIGNIFICANT CHANGE UP (ref 3.3–5)
ALP SERPL-CCNC: 69 U/L — SIGNIFICANT CHANGE UP (ref 40–120)
ALT FLD-CCNC: 23 U/L — SIGNIFICANT CHANGE UP (ref 10–45)
ANION GAP SERPL CALC-SCNC: 13 MMOL/L — SIGNIFICANT CHANGE UP (ref 5–17)
AST SERPL-CCNC: 18 U/L — SIGNIFICANT CHANGE UP (ref 10–40)
BILIRUB SERPL-MCNC: 2.2 MG/DL — HIGH (ref 0.2–1.2)
BUN SERPL-MCNC: 23 MG/DL — SIGNIFICANT CHANGE UP (ref 7–23)
CALCIUM SERPL-MCNC: 8.5 MG/DL — SIGNIFICANT CHANGE UP (ref 8.4–10.5)
CHLORIDE SERPL-SCNC: 92 MMOL/L — LOW (ref 96–108)
CO2 SERPL-SCNC: 27 MMOL/L — SIGNIFICANT CHANGE UP (ref 22–31)
CREAT SERPL-MCNC: 6.77 MG/DL — HIGH (ref 0.5–1.3)
EGFR: 6 ML/MIN/1.73M2 — LOW
GLUCOSE SERPL-MCNC: 85 MG/DL — SIGNIFICANT CHANGE UP (ref 70–99)
HCT VFR BLD CALC: 22 % — LOW (ref 34.5–45)
HGB BLD-MCNC: 7 G/DL — CRITICAL LOW (ref 11.5–15.5)
MAGNESIUM SERPL-MCNC: 2 MG/DL — SIGNIFICANT CHANGE UP (ref 1.6–2.6)
MCHC RBC-ENTMCNC: 31 PG — SIGNIFICANT CHANGE UP (ref 27–34)
MCHC RBC-ENTMCNC: 31.8 GM/DL — LOW (ref 32–36)
MCV RBC AUTO: 97.3 FL — SIGNIFICANT CHANGE UP (ref 80–100)
MRSA PCR RESULT.: SIGNIFICANT CHANGE UP
NRBC # BLD: 1 /100 WBCS — HIGH (ref 0–0)
PHOSPHATE SERPL-MCNC: 3.5 MG/DL — SIGNIFICANT CHANGE UP (ref 2.5–4.5)
PLATELET # BLD AUTO: 96 K/UL — LOW (ref 150–400)
POTASSIUM SERPL-MCNC: 4 MMOL/L — SIGNIFICANT CHANGE UP (ref 3.5–5.3)
POTASSIUM SERPL-SCNC: 4 MMOL/L — SIGNIFICANT CHANGE UP (ref 3.5–5.3)
PROT SERPL-MCNC: 6.4 G/DL — SIGNIFICANT CHANGE UP (ref 6–8.3)
RBC # BLD: 2.26 M/UL — LOW (ref 3.8–5.2)
RBC # FLD: 17.7 % — HIGH (ref 10.3–14.5)
S AUREUS DNA NOSE QL NAA+PROBE: SIGNIFICANT CHANGE UP
SODIUM SERPL-SCNC: 132 MMOL/L — LOW (ref 135–145)
WBC # BLD: 3.98 K/UL — SIGNIFICANT CHANGE UP (ref 3.8–10.5)
WBC # FLD AUTO: 3.98 K/UL — SIGNIFICANT CHANGE UP (ref 3.8–10.5)

## 2022-09-25 PROCEDURE — 87340 HEPATITIS B SURFACE AG IA: CPT

## 2022-09-25 PROCEDURE — 99232 SBSQ HOSP IP/OBS MODERATE 35: CPT

## 2022-09-25 PROCEDURE — 86705 HEP B CORE ANTIBODY IGM: CPT

## 2022-09-25 PROCEDURE — 85027 COMPLETE CBC AUTOMATED: CPT

## 2022-09-25 PROCEDURE — 85610 PROTHROMBIN TIME: CPT

## 2022-09-25 PROCEDURE — 84295 ASSAY OF SERUM SODIUM: CPT

## 2022-09-25 PROCEDURE — 85025 COMPLETE CBC W/AUTO DIFF WBC: CPT

## 2022-09-25 PROCEDURE — 85730 THROMBOPLASTIN TIME PARTIAL: CPT

## 2022-09-25 PROCEDURE — 84484 ASSAY OF TROPONIN QUANT: CPT

## 2022-09-25 PROCEDURE — 99261: CPT

## 2022-09-25 PROCEDURE — 82803 BLOOD GASES ANY COMBINATION: CPT

## 2022-09-25 PROCEDURE — 82947 ASSAY GLUCOSE BLOOD QUANT: CPT

## 2022-09-25 PROCEDURE — 87641 MR-STAPH DNA AMP PROBE: CPT

## 2022-09-25 PROCEDURE — 86850 RBC ANTIBODY SCREEN: CPT

## 2022-09-25 PROCEDURE — 87086 URINE CULTURE/COLONY COUNT: CPT

## 2022-09-25 PROCEDURE — 87640 STAPH A DNA AMP PROBE: CPT

## 2022-09-25 PROCEDURE — 84132 ASSAY OF SERUM POTASSIUM: CPT

## 2022-09-25 PROCEDURE — 86706 HEP B SURFACE ANTIBODY: CPT

## 2022-09-25 PROCEDURE — 71045 X-RAY EXAM CHEST 1 VIEW: CPT

## 2022-09-25 PROCEDURE — 86803 HEPATITIS C AB TEST: CPT

## 2022-09-25 PROCEDURE — 85018 HEMOGLOBIN: CPT

## 2022-09-25 PROCEDURE — U0005: CPT

## 2022-09-25 PROCEDURE — 83605 ASSAY OF LACTIC ACID: CPT

## 2022-09-25 PROCEDURE — 85014 HEMATOCRIT: CPT

## 2022-09-25 PROCEDURE — 81001 URINALYSIS AUTO W/SCOPE: CPT

## 2022-09-25 PROCEDURE — C1769: CPT

## 2022-09-25 PROCEDURE — 82435 ASSAY OF BLOOD CHLORIDE: CPT

## 2022-09-25 PROCEDURE — 83690 ASSAY OF LIPASE: CPT

## 2022-09-25 PROCEDURE — 83735 ASSAY OF MAGNESIUM: CPT

## 2022-09-25 PROCEDURE — 87040 BLOOD CULTURE FOR BACTERIA: CPT

## 2022-09-25 PROCEDURE — 36415 COLL VENOUS BLD VENIPUNCTURE: CPT

## 2022-09-25 PROCEDURE — 76705 ECHO EXAM OF ABDOMEN: CPT

## 2022-09-25 PROCEDURE — 80053 COMPREHEN METABOLIC PANEL: CPT

## 2022-09-25 PROCEDURE — 74176 CT ABD & PELVIS W/O CONTRAST: CPT | Mod: MA

## 2022-09-25 PROCEDURE — 86901 BLOOD TYPING SEROLOGIC RH(D): CPT

## 2022-09-25 PROCEDURE — 86900 BLOOD TYPING SEROLOGIC ABO: CPT

## 2022-09-25 PROCEDURE — 87637 SARSCOV2&INF A&B&RSV AMP PRB: CPT

## 2022-09-25 PROCEDURE — 86704 HEP B CORE ANTIBODY TOTAL: CPT

## 2022-09-25 PROCEDURE — 84100 ASSAY OF PHOSPHORUS: CPT

## 2022-09-25 PROCEDURE — 74330 X-RAY BILE/PANC ENDOSCOPY: CPT

## 2022-09-25 PROCEDURE — U0003: CPT

## 2022-09-25 PROCEDURE — 82330 ASSAY OF CALCIUM: CPT

## 2022-09-25 PROCEDURE — 99285 EMERGENCY DEPT VISIT HI MDM: CPT

## 2022-09-25 PROCEDURE — 86922 COMPATIBILITY TEST ANTIGLOB: CPT

## 2022-09-25 RX ORDER — ACETAMINOPHEN 500 MG
2 TABLET ORAL
Qty: 0 | Refills: 0 | DISCHARGE
Start: 2022-09-25

## 2022-09-25 RX ADMIN — Medication 1 TABLET(S): at 11:43

## 2022-09-25 RX ADMIN — PANTOPRAZOLE SODIUM 40 MILLIGRAM(S): 20 TABLET, DELAYED RELEASE ORAL at 06:01

## 2022-09-25 RX ADMIN — OXYCODONE HYDROCHLORIDE 10 MILLIGRAM(S): 5 TABLET ORAL at 08:00

## 2022-09-25 RX ADMIN — OXYCODONE HYDROCHLORIDE 10 MILLIGRAM(S): 5 TABLET ORAL at 07:25

## 2022-09-25 RX ADMIN — Medication 1334 MILLIGRAM(S): at 09:56

## 2022-09-25 RX ADMIN — DANAZOL 200 MILLIGRAM(S): 200 CAPSULE ORAL at 05:56

## 2022-09-25 RX ADMIN — DANAZOL 200 MILLIGRAM(S): 200 CAPSULE ORAL at 13:04

## 2022-09-25 RX ADMIN — Medication 650 MILLIGRAM(S): at 13:49

## 2022-09-25 RX ADMIN — OXYCODONE HYDROCHLORIDE 10 MILLIGRAM(S): 5 TABLET ORAL at 13:08

## 2022-09-25 RX ADMIN — Medication 1334 MILLIGRAM(S): at 11:43

## 2022-09-25 RX ADMIN — CHLORHEXIDINE GLUCONATE 1 APPLICATION(S): 213 SOLUTION TOPICAL at 11:44

## 2022-09-25 NOTE — DISCHARGE NOTE PROVIDER - CARE PROVIDERS DIRECT ADDRESSES
[email or web address]

## 2022-09-25 NOTE — DISCHARGE NOTE PROVIDER - NSDCMRMEDTOKEN_GEN_ALL_CORE_FT
calcium acetate 667 mg oral tablet: 2 tab(s) orally 3 times a day  danazol 200 mg oral capsule: 1 cap(s) orally 3 times a day  Jakafi 10 mg oral tablet: orally 3 times a week AFTER HEMODIALYSIS  Nephro-Ben oral tablet: 1 tab(s) orally once a day  propranolol 10 mg oral tablet: orally once a day   acetaminophen 325 mg oral tablet: 2 tab(s) orally every 6 hours  calcium acetate 667 mg oral tablet: 2 tab(s) orally 3 times a day  danazol 200 mg oral capsule: 1 cap(s) orally 3 times a day  Jakafi 10 mg oral tablet: orally 3 times a week AFTER HEMODIALYSIS  Nephro-Ben oral tablet: 1 tab(s) orally once a day  propranolol 10 mg oral tablet: orally once a day

## 2022-09-25 NOTE — DISCHARGE NOTE PROVIDER - PROVIDER TOKENS
PROVIDER:[TOKEN:[[id number]],FOLLOWUP:[2 weeks]],PROVIDER:[TOKEN:[[id number]],FOLLOWUP:[2 weeks]]

## 2022-09-25 NOTE — DISCHARGE NOTE NURSING/CASE MANAGEMENT/SOCIAL WORK - PATIENT PORTAL LINK FT
You can access the FollowMyHealth Patient Portal offered by [facility] by registering at the following website: [email or web address]. By joining [employer]’s FollowMyHealth portal, you will also be able to view your health information using other applications (apps) compatible with our system.

## 2022-09-25 NOTE — PROGRESS NOTE ADULT - PROVIDER SPECIALTY LIST ADULT
Gastroenterology
Trauma Surgery
Trauma Surgery
Gastroenterology
Trauma Surgery
Trauma Surgery
Hospitalist
Nephrology

## 2022-09-25 NOTE — PROGRESS NOTE ADULT - REASON FOR ADMISSION
r/o choledocholithiasis

## 2022-09-25 NOTE — PROGRESS NOTE ADULT - ATTENDING COMMENTS
seen and examined 09-25-22 @ 0813    tolerating clears w/o nausea or vomiting  epigastric pain resolved    soft / NT / ND  old PD catheters cars  no jaundice      9/22/2022 - ERCP / biliary sphincterotomy / CBD stone removal / fully-covered metal stent placement for choledocholithiasis  -given cirrhosis and other severe medical comorbidities, the risks of cholecystectomy strongly outweighs the benefit    post-ERCP pancreatitis resloved  -discharge home today    ESRD on HD MWF  -resume outpatient HD tomorrow      I reviewed her labs.

## 2022-09-25 NOTE — DISCHARGE NOTE PROVIDER - CARE PROVIDER_API CALL
Gabriel Elizabeth)  Surgery; Surgical Critical Care  [address], [address]  [place] [address]  Phone: [phone]  Fax: [phone]  Follow Up Time: 2 weeks    Mark Marsh)  Gastroenterology  [address] [place] [address]  Phone: [phone]  Fax: [phone]  Follow Up Time: 2 weeks

## 2022-09-25 NOTE — DISCHARGE NOTE PROVIDER - NSDCCPCAREPLAN_GEN_ALL_CORE_FT
PRINCIPAL DISCHARGE DIAGNOSIS  Diagnosis: Abdominal pain  Assessment and Plan of Treatment: 1.  Renal diet  2.  Activity as tolerated  3.  Follow-up with Dr. Elizabeth or one of his partners within 1-2 weeks to discuss shceduling cholecystectomy.  Please call office for appointment.  4.  Follow-up with GI after cholecystectomy for stent removal.      SECONDARY DISCHARGE DIAGNOSES  Diagnosis: End stage renal disease  Assessment and Plan of Treatment: Follow-up with your nephrologist for regularly scheduled dialysis.

## 2022-09-25 NOTE — PROVIDER CONTACT NOTE (OTHER) - ASSESSMENT
asymptomatic, denies pain/dizziness/weakness, A&Ox4, independent, in no acute distress. T 98, HR 72 BP 95/56 LEATHA (LA precautions In place) RR 18 95% room air. Pt also refusing heparin & tylenol

## 2022-09-25 NOTE — PROGRESS NOTE ADULT - ASSESSMENT
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  ED CT mild CBD dilatation up to 9 mm. Cholelithiasis and gallbladder distention without associated pericholecystic inflammation s/p ERCP with sphincterotomy and stent placement 9/22. Now w/ post-ERCP pancreatitis    Plan:  -DVT prophylaxis w/ SQH.   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  ED CT mild CBD dilatation up to 9 mm. Cholelithiasis and gallbladder distention without associated pericholecystic inflammation s/p ERCP with sphincterotomy and stent placement 9/22. Now w/ post-ERCP pancreatitis    Plan:  -DVT prophylaxis w/ SQH  -Strict I&O's  -Analgesia and antiemetics as needed  -F/u am labs  -advanced to renal diet  -if tolerating, possible dc home today      ATP Surgery   [id number]

## 2022-09-25 NOTE — PROGRESS NOTE ADULT - SUBJECTIVE AND OBJECTIVE BOX
INTERVAL EVENTS: No acute events overnight.  SUBJECTIVE: Patient seen and examined at bedside with surgical team, patient without complaints. Denies fever, chills, CP, SOB nausea, vomiting, abdominal pain.    OBJECTIVE:    Vital Signs Last 24 Hrs  T(C): 36.7 (25 Sep 2022 01:21), Max: 37.7 (24 Sep 2022 05:16)  T(F): 98.1 (25 Sep 2022 01:21), Max: 99.9 (24 Sep 2022 05:16)  HR: 79 (25 Sep 2022 01:21) (79 - 98)  BP: 110/59 (25 Sep 2022 01:21) (110/59 - 121/76)  BP(mean): --  RR: 18 (25 Sep 2022 01:21) (18 - 18)  SpO2: 98% (25 Sep 2022 01:21) (92% - 99%)    Parameters below as of 25 Sep 2022 01:21  Patient On (Oxygen Delivery Method): room air    I&O's Detail    23 Sep 2022 07:01  -  24 Sep 2022 07:00  --------------------------------------------------------  IN:    Lactated Ringers: 550 mL    Lactated Ringers: 1088 mL    Oral Fluid: 720 mL  Total IN: 2358 mL    OUT:    Other (mL): 1500 mL    Voided (mL): 0 mL  Total OUT: 1500 mL    Total NET: 858 mL      24 Sep 2022 07:01  -  25 Sep 2022 03:40  --------------------------------------------------------  IN:    Lactated Ringers: 675 mL    Oral Fluid: 1340 mL  Total IN: 2015 mL    OUT:    Voided (mL): 0 mL  Total OUT: 0 mL    Total NET: 2015 mL      MEDICATIONS  (STANDING):  acetaminophen     Tablet .. 650 milliGRAM(s) Oral every 6 hours  calcium acetate 1334 milliGRAM(s) Oral three times a day with meals  chlorhexidine 2% Cloths 1 Application(s) Topical daily  danazol 200 milliGRAM(s) Oral three times a day  heparin   Injectable 5000 Unit(s) SubCutaneous every 8 hours  Nephro-deonna 1 Tablet(s) Oral daily  pantoprazole    Tablet 40 milliGRAM(s) Oral before breakfast  propranolol 10 milliGRAM(s) Oral daily    MEDICATIONS  (PRN):  ondansetron Injectable 4 milliGRAM(s) IV Push once PRN Nausea and/or Vomiting  oxyCODONE    IR 5 milliGRAM(s) Oral every 4 hours PRN Moderate Pain (4 - 6)  oxyCODONE    IR 10 milliGRAM(s) Oral every 4 hours PRN Severe Pain (7 - 10)      PHYSICAL EXAM:  Constitutional: A&Ox3, NAD  Respiratory: Unlabored breathing  Abdomen: Soft, nondistended, NTTP. No rebound or guarding.  Extremities: WWP, RAHMAN spontaneously    LABS:                        7.8    5.92  )-----------( 110      ( 24 Sep 2022 06:44 )             24.9     09-24    132<L>  |  91<L>  |  14  ----------------------------<  99  4.2   |  27  |  4.91<H>    Ca    8.8      24 Sep 2022 06:46  Phos  3.7     09-24  Mg     1.8     09-24    TPro  6.8  /  Alb  3.8  /  TBili  2.2<H>  /  DBili  x   /  AST  23  /  ALT  33  /  AlkPhos  90  09-24      LIVER FUNCTIONS - ( 24 Sep 2022 06:46 )  Alb: 3.8 g/dL / Pro: 6.8 g/dL / ALK PHOS: 90 U/L / ALT: 33 U/L / AST: 23 U/L / GGT: x                 IMAGING:     INTERVAL EVENTS: No acute events overnight.  SUBJECTIVE: Patient seen and examined at bedside with surgical team, patient without complaints. Reports pain is gone, as well as nausea. Denies fever, chills, CP, SOB, vomiting.    OBJECTIVE:    Vital Signs Last 24 Hrs  T(C): 36.7 (25 Sep 2022 01:21), Max: 37.7 (24 Sep 2022 05:16)  T(F): 98.1 (25 Sep 2022 01:21), Max: 99.9 (24 Sep 2022 05:16)  HR: 79 (25 Sep 2022 01:21) (79 - 98)  BP: 110/59 (25 Sep 2022 01:21) (110/59 - 121/76)  BP(mean): --  RR: 18 (25 Sep 2022 01:21) (18 - 18)  SpO2: 98% (25 Sep 2022 01:21) (92% - 99%)    Parameters below as of 25 Sep 2022 01:21  Patient On (Oxygen Delivery Method): room air    I&O's Detail    23 Sep 2022 07:01  -  24 Sep 2022 07:00  --------------------------------------------------------  IN:    Lactated Ringers: 550 mL    Lactated Ringers: 1088 mL    Oral Fluid: 720 mL  Total IN: 2358 mL    OUT:    Other (mL): 1500 mL    Voided (mL): 0 mL  Total OUT: 1500 mL    Total NET: 858 mL      24 Sep 2022 07:01  -  25 Sep 2022 03:40  --------------------------------------------------------  IN:    Lactated Ringers: 675 mL    Oral Fluid: 1340 mL  Total IN: 2015 mL    OUT:    Voided (mL): 0 mL  Total OUT: 0 mL    Total NET: 2015 mL      MEDICATIONS  (STANDING):  acetaminophen     Tablet .. 650 milliGRAM(s) Oral every 6 hours  calcium acetate 1334 milliGRAM(s) Oral three times a day with meals  chlorhexidine 2% Cloths 1 Application(s) Topical daily  danazol 200 milliGRAM(s) Oral three times a day  heparin   Injectable 5000 Unit(s) SubCutaneous every 8 hours  Nephro-deonna 1 Tablet(s) Oral daily  pantoprazole    Tablet 40 milliGRAM(s) Oral before breakfast  propranolol 10 milliGRAM(s) Oral daily    MEDICATIONS  (PRN):  ondansetron Injectable 4 milliGRAM(s) IV Push once PRN Nausea and/or Vomiting  oxyCODONE    IR 5 milliGRAM(s) Oral every 4 hours PRN Moderate Pain (4 - 6)  oxyCODONE    IR 10 milliGRAM(s) Oral every 4 hours PRN Severe Pain (7 - 10)      PHYSICAL EXAM:  Constitutional: A&Ox3, NAD  Respiratory: Unlabored breathing  Abdomen: Soft, nondistended, NTTP. No rebound or guarding.  Extremities: WWP, RAHMAN spontaneously    LABS:                        7.8    5.92  )-----------( 110      ( 24 Sep 2022 06:44 )             24.9     09-24    132<L>  |  91<L>  |  14  ----------------------------<  99  4.2   |  27  |  4.91<H>    Ca    8.8      24 Sep 2022 06:46  Phos  3.7     09-24  Mg     1.8     09-24    TPro  6.8  /  Alb  3.8  /  TBili  2.2<H>  /  DBili  x   /  AST  23  /  ALT  33  /  AlkPhos  90  09-24      LIVER FUNCTIONS - ( 24 Sep 2022 06:46 )  Alb: 3.8 g/dL / Pro: 6.8 g/dL / ALK PHOS: 90 U/L / ALT: 33 U/L / AST: 23 U/L / GGT: x                 IMAGING:

## 2022-09-25 NOTE — DISCHARGE NOTE PROVIDER - NSDCFUSCHEDAPPT_GEN_ALL_CORE_FT
[facility] Physician [facility]  [facility]  Scheduled Appointment: 10/20/2022    Jacky Guo  [facility]  [facility]  Scheduled Appointment: 10/20/2022    Wily Wright  [facility]  ENDOVASCULAR [address]   Scheduled Appointment: 10/25/2022

## 2022-09-25 NOTE — DISCHARGE NOTE PROVIDER - HOSPITAL COURSE
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 Denies fevers, chills, lightheadedness, SOB, CP.   ED CT mild CBD dilatation up to 9 mm. Cholelithiasis and gallbladder distention without associated pericholecystic inflammation.  Lab w/u significant for TBili 6.7, AST/ALT 79/65, lipase elevated 69.  She was admitted to the surgical service. GI was consulted for possible EUS/ERCP with stent/sphincterotomy and defer lap cholecystectomy given her social situation and in the setting of known portal hypertension with periportal varices.  Nephrology was consulted to continue dialysis while hospitalized.  On 9/22 she underwent ERCP, sphincterotomy, stone extraction, stent placement.  Tolerated procedure well.  To follow-up with GI for stent extraction after cholecystectomy.    She remained stable post procedure.  Diet was advanced as tolerated from clears to regular. 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 Denies fevers, chills, lightheadedness, SOB, CP.   ED CT mild CBD dilatation up to 9 mm. Cholelithiasis and gallbladder distention without associated pericholecystic inflammation.  Lab w/u significant for TBili 6.7, AST/ALT 79/65, lipase elevated 69.  She was admitted to the surgical service. GI was consulted for possible EUS/ERCP with stent/sphincterotomy and defer lap cholecystectomy given her social situation and in the setting of known portal hypertension with periportal varices.  Nephrology was consulted to continue dialysis while hospitalized.  On 9/22 she underwent ERCP, sphincterotomy, stone extraction, stent placement.  Tolerated procedure well.  To follow-up with GI for stent extraction after cholecystectomy.    She remained stable post procedure.  Diet was advanced as tolerated from clears to renal diet which she tolerated.  She is ambulating, tolerating a diet, and is stable for discharge home.  She will follow-up with Dr. Elizabeth or one of his partners within 1-2 weeks, and will follow-up with GI after cholecystectomy for stent removal.

## 2022-09-25 NOTE — DISCHARGE NOTE NURSING/CASE MANAGEMENT/SOCIAL WORK - NSDCVIVACCINE_GEN_ALL_CORE_FT
influenza, injectable, quadrivalent, preservative free; 20-Nov-2014 12:44; Aston Ulloa (RN); [employer]; [id number]; IntraMuscular; Deltoid Left.; 0.5 milliLiter(s);   influenza, injectable, quadrivalent, preservative free; 05-Oct-2016 18:15; Rosario James (RN); [employer]; [id number]; IntraMuscular; Deltoid Left.; 0.5 milliLiter(s); VIS (VIS Published: 07-Aug-2015, VIS Presented: 05-Oct-2016);

## 2022-09-26 ENCOUNTER — APPOINTMENT (OUTPATIENT)
Dept: INTERNAL MEDICINE | Facility: CLINIC | Age: 62
End: 2022-09-26

## 2022-09-26 ENCOUNTER — EMERGENCY (EMERGENCY)
Facility: HOSPITAL | Age: 62
LOS: 1 days | Discharge: HOME | End: 2022-09-26
Attending: PERSONAL EMERGENCY RESPONSE ATTENDANT
Payer: MEDICAID

## 2022-09-26 ENCOUNTER — OUTPATIENT (OUTPATIENT)
Dept: OUTPATIENT SERVICES | Facility: HOSPITAL | Age: 62
LOS: 1 days | End: 2022-09-26
Payer: SELF-PAY

## 2022-09-26 VITALS
WEIGHT: 116.84 LBS | SYSTOLIC BLOOD PRESSURE: 134 MMHG | HEART RATE: 87 BPM | RESPIRATION RATE: 18 BRPM | OXYGEN SATURATION: 99 % | DIASTOLIC BLOOD PRESSURE: 76 MMHG | HEIGHT: 62 IN

## 2022-09-26 DIAGNOSIS — T85.898A OTHER SPECIFIED COMPLICATION OF OTHER INTERNAL PROSTHETIC DEVICES, IMPLANTS AND GRAFTS, INITIAL ENCOUNTER: Chronic | ICD-10-CM

## 2022-09-26 DIAGNOSIS — I10 ESSENTIAL (PRIMARY) HYPERTENSION: ICD-10-CM

## 2022-09-26 DIAGNOSIS — Z29.8 ENCOUNTER FOR OTHER SPECIFIED PROPHYLACTIC MEASURES: ICD-10-CM

## 2022-09-26 DIAGNOSIS — Z99.2 DEPENDENCE ON RENAL DIALYSIS: Chronic | ICD-10-CM

## 2022-09-26 DIAGNOSIS — I77.0 ARTERIOVENOUS FISTULA, ACQUIRED: Chronic | ICD-10-CM

## 2022-09-26 LAB
ALBUMIN SERPL ELPH-MCNC: 4.1 G/DL — SIGNIFICANT CHANGE UP (ref 3.3–5)
ALP SERPL-CCNC: 87 U/L — SIGNIFICANT CHANGE UP (ref 40–120)
ALT FLD-CCNC: 21 U/L — SIGNIFICANT CHANGE UP (ref 10–45)
ANION GAP SERPL CALC-SCNC: 15 MMOL/L — SIGNIFICANT CHANGE UP (ref 5–17)
AST SERPL-CCNC: 21 U/L — SIGNIFICANT CHANGE UP (ref 10–40)
BASE EXCESS BLDV CALC-SCNC: 2.6 MMOL/L — SIGNIFICANT CHANGE UP (ref -2–3)
BASOPHILS # BLD AUTO: 0.06 K/UL — SIGNIFICANT CHANGE UP (ref 0–0.2)
BASOPHILS NFR BLD AUTO: 1.5 % — SIGNIFICANT CHANGE UP (ref 0–2)
BILIRUB SERPL-MCNC: 1.7 MG/DL — HIGH (ref 0.2–1.2)
BUN SERPL-MCNC: 20 MG/DL — SIGNIFICANT CHANGE UP (ref 7–23)
CA-I SERPL-SCNC: 1.22 MMOL/L — SIGNIFICANT CHANGE UP (ref 1.15–1.33)
CALCIUM SERPL-MCNC: 9.5 MG/DL — SIGNIFICANT CHANGE UP (ref 8.4–10.5)
CHLORIDE BLDV-SCNC: 98 MMOL/L — SIGNIFICANT CHANGE UP (ref 96–108)
CHLORIDE SERPL-SCNC: 95 MMOL/L — LOW (ref 96–108)
CO2 BLDV-SCNC: 28 MMOL/L — HIGH (ref 22–26)
CO2 SERPL-SCNC: 24 MMOL/L — SIGNIFICANT CHANGE UP (ref 22–31)
CREAT SERPL-MCNC: 5.91 MG/DL — HIGH (ref 0.5–1.3)
EGFR: 8 ML/MIN/1.73M2 — LOW
EOSINOPHIL # BLD AUTO: 0.07 K/UL — SIGNIFICANT CHANGE UP (ref 0–0.5)
EOSINOPHIL NFR BLD AUTO: 1.7 % — SIGNIFICANT CHANGE UP (ref 0–6)
GAS PNL BLDV: 132 MMOL/L — LOW (ref 136–145)
GAS PNL BLDV: SIGNIFICANT CHANGE UP
GAS PNL BLDV: SIGNIFICANT CHANGE UP
GLUCOSE BLDV-MCNC: 111 MG/DL — HIGH (ref 70–99)
GLUCOSE SERPL-MCNC: 115 MG/DL — HIGH (ref 70–99)
HCO3 BLDV-SCNC: 27 MMOL/L — SIGNIFICANT CHANGE UP (ref 22–29)
HCT VFR BLD CALC: 27.3 % — LOW (ref 34.5–45)
HCT VFR BLDA CALC: 26 % — LOW (ref 34.5–46.5)
HGB BLD CALC-MCNC: 8.7 G/DL — LOW (ref 11.7–16.1)
HGB BLD-MCNC: 8.4 G/DL — LOW (ref 11.5–15.5)
IMM GRANULOCYTES NFR BLD AUTO: 6.2 % — HIGH (ref 0–0.9)
LACTATE BLDV-MCNC: 0.4 MMOL/L — LOW (ref 0.5–2)
LIDOCAIN IGE QN: 78 U/L — HIGH (ref 7–60)
LYMPHOCYTES # BLD AUTO: 0.35 K/UL — LOW (ref 1–3.3)
LYMPHOCYTES # BLD AUTO: 8.6 % — LOW (ref 13–44)
MCHC RBC-ENTMCNC: 30.3 PG — SIGNIFICANT CHANGE UP (ref 27–34)
MCHC RBC-ENTMCNC: 30.8 GM/DL — LOW (ref 32–36)
MCV RBC AUTO: 98.6 FL — SIGNIFICANT CHANGE UP (ref 80–100)
MONOCYTES # BLD AUTO: 0.25 K/UL — SIGNIFICANT CHANGE UP (ref 0–0.9)
MONOCYTES NFR BLD AUTO: 6.2 % — SIGNIFICANT CHANGE UP (ref 2–14)
NEUTROPHILS # BLD AUTO: 3.08 K/UL — SIGNIFICANT CHANGE UP (ref 1.8–7.4)
NEUTROPHILS NFR BLD AUTO: 75.8 % — SIGNIFICANT CHANGE UP (ref 43–77)
NRBC # BLD: 2 /100 WBCS — HIGH (ref 0–0)
PCO2 BLDV: 41 MMHG — SIGNIFICANT CHANGE UP (ref 39–42)
PH BLDV: 7.43 — SIGNIFICANT CHANGE UP (ref 7.32–7.43)
PLATELET # BLD AUTO: 113 K/UL — LOW (ref 150–400)
PO2 BLDV: 34 MMHG — SIGNIFICANT CHANGE UP (ref 25–45)
POTASSIUM BLDV-SCNC: 4 MMOL/L — SIGNIFICANT CHANGE UP (ref 3.5–5.1)
POTASSIUM SERPL-MCNC: 3.9 MMOL/L — SIGNIFICANT CHANGE UP (ref 3.5–5.3)
POTASSIUM SERPL-SCNC: 3.9 MMOL/L — SIGNIFICANT CHANGE UP (ref 3.5–5.3)
PROT SERPL-MCNC: 7.5 G/DL — SIGNIFICANT CHANGE UP (ref 6–8.3)
RBC # BLD: 2.77 M/UL — LOW (ref 3.8–5.2)
RBC # FLD: 17.9 % — HIGH (ref 10.3–14.5)
SAO2 % BLDV: 70.7 % — SIGNIFICANT CHANGE UP (ref 67–88)
SODIUM SERPL-SCNC: 134 MMOL/L — LOW (ref 135–145)
WBC # BLD: 4.06 K/UL — SIGNIFICANT CHANGE UP (ref 3.8–10.5)
WBC # FLD AUTO: 4.06 K/UL — SIGNIFICANT CHANGE UP (ref 3.8–10.5)

## 2022-09-26 PROCEDURE — 71045 X-RAY EXAM CHEST 1 VIEW: CPT | Mod: 26

## 2022-09-26 PROCEDURE — ZZZZZ: CPT | Mod: GE

## 2022-09-26 PROCEDURE — 99285 EMERGENCY DEPT VISIT HI MDM: CPT

## 2022-09-26 PROCEDURE — 74177 CT ABD & PELVIS W/CONTRAST: CPT | Mod: 26,MA

## 2022-09-26 PROCEDURE — 93010 ELECTROCARDIOGRAM REPORT: CPT

## 2022-09-26 PROCEDURE — G0463: CPT

## 2022-09-26 NOTE — HISTORY OF PRESENT ILLNESS
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 Denies fevers, chills, lightheadedness, SOB, CP.\par ED CT mild CBD dilatation up to 9 mm. Cholelithiasis and gallbladder distention without associated pericholecystic inflammation. Lab w/u significant for TBili 6.7, AST/ALT 79/65, lipase elevated 69. She was admitted to the surgical service. GI was consulted for possible EUS/ERCP with stent/sphincterotomy and defer lap cholecystectomy given her\par social situation and in the setting of known portal hypertension with periportal varices. Nephrology was consulted to continue dialysis while hospitalized.\par On 9/22 she underwent ERCP, sphincterotomy, stone extraction, stent placement. Tolerated procedure well. To follow-up with GI for stent extraction after cholecystectomy. She remained stable post procedure. Diet was advanced as tolerated from clears to renal diet which she tolerated. She is ambulating, tolerating a diet, and is stable for discharge home. She will follow-up with Dr. Elizabeth or one of his partners within 1-2 weeks, and will follow-up with GI after cholecystectomy for stent removal.\par \par \par Patient presented for virtual visit for follow up after d/c from hospital yesterday. Patient says abdominal pain has resolved but still feels uncomfortable, melinda in RUQ (under R. breast). She reports that it is difficult to take deep breaths. Patient also still feels dizzy and nauseous but is able to tolerate a diet. \par Pt was instructed to make an appt with Dr. Elizabeth in regards to cholecystectomy and to f/u with clinic prior to the surgery for pre-surgical testing. Pt/daughter voiced understanding.

## 2022-09-26 NOTE — ED PROVIDER NOTE - NSFOLLOWUPINSTRUCTIONS_ED_ALL_ED_FT
Patient given general surgery follow up with Dr. Elizabeth in 2-3 days and return precautions. Patient informed that surgery recommended evaluation as well for cholecystectomy as well in outpatient follow up.     You were seen in the Emergency Department for ileus. You were evaluated by our surgery team and given improvement on symptoms recommended for discharge with follow up outpatient.     1) Advance activity as tolerated.   2) Continue all previously prescribed medications as directed.    3) Follow up with general surgery  with Dr. Elizabeth in 2-3 days and return precautions. Surgery recommended evaluation as well for cholecystectomy as well in outpatient follow up - take copies of your results.    4) Return to the Emergency Department for worsening or persistent symptoms, and/or ANY NEW OR CONCERNING SYMPTOMS. Patient given general surgery follow up with Dr. Elizabeth in 2-3 days and return precautions. Patient informed that surgery recommended evaluation as well for cholecystectomy as well in outpatient follow up.     You were seen in the Emergency Department for ileus. You were evaluated by our surgery team and given improvement on symptoms recommended for discharge with follow up outpatient.     1) Advance activity as tolerated.   2) Continue all previously prescribed medications as directed.    3) Follow up with general surgery  with Dr. Elizabeth in 2-3 days and return precautions. Surgery recommended evaluation as well for cholecystectomy as well in outpatient follow up - take copies of your results. Call nephrology to set up appointment for Dialysis given your interrupted session today.  4) Return to the Emergency Department for worsening or persistent symptoms, and/or ANY NEW OR CONCERNING SYMPTOMS.

## 2022-09-26 NOTE — ED PROVIDER NOTE - PROGRESS NOTE DETAILS
Surgery consulted to evaluate patient for ileus. Due to patient resuming stooling during evaluation surgery recommended PO challenge and discharge if tolerated. Patient given full meal and tolerated well. Patient reports improvement on symptoms. Spoke to patient about results. Plan to discharge patient. Patient given general surgery follow up with Dr. Elizabeth in 2-3 days and return precautions. Patient informed that surgery recommended evaluation as well for cholecystectomy as well in outpatient follow up. Patient agrees with plan. Surgery consulted to evaluate patient for ileus. Due to patient resuming stooling during evaluation surgery recommended PO challenge and discharge if tolerated. Patient given full meal and tolerated well. Patient reports improvement on symptoms. Spoke to patient about results. Plan to discharge patient. Patient given general surgery follow up with Dr. Elizabeth in 2-3 days and return precautions. Patient informed that surgery recommended evaluation as well for cholecystectomy as well in outpatient follow up. Patient told to call nephrology to set up appointment for Dialysis given their interrupted session on Monday. Patient agrees with plan.

## 2022-09-26 NOTE — ED ADULT NURSE NOTE - OBJECTIVE STATEMENT
62yF hx ESRD on HD via R AVF presents with abd pain from HD. pt declines pain medication at this time but is aware medication available if she needs. per EMS they gave 4 Zofran IV via R hand #22g IVL. RCW port in place- pending CXR and port access. MD Daniel at bedside pt with no further questions at this time. 62yF hx ESRD on HD via R AVF presents with abd pain from HD. pt was unable to complete HD session due to pain. pt declines pain medication at this time but is aware medication available if she needs. per EMS they gave 4 Zofran IV via R hand #22g IVL. RCW port in place- pending CXR and port access. MD Daniel at bedside pt with no further questions at this time.

## 2022-09-26 NOTE — ED PROVIDER NOTE - ATTENDING CONTRIBUTION TO CARE
Attending MD Daniel.  Agree with above.  Pt is a presents from dialysis with complaint of pt was supposed to have 2.2 and got 1.

## 2022-09-26 NOTE — ED PROVIDER NOTE - OBJECTIVE STATEMENT
Attending MD Daniel.  Pt is a 63 yo fem with pmhx of Attending MD Daniel.  Pt is a 61 yo fem with pmhx of      Chris Dejesus PGY-2:61y/o F with PMH of biliary stent surgery 4 days ago, myelofibrosis polycythemia vera, and HTN, CKD (MWF) presents with constant RUQ non-radiating abd pain x 1 day. Pt notes her abd feels "like a balloon" and her last bowel movement was 5 days ago. Abd fullness is slightly relieved with flatulence and eructation. Pain is associated with nausea but no episodes of vomitting. Hgb is 7.0, 5 days ago at [facility] had elevated lfts and  CT A/P showed 9mm CBD dilation and cholelithiasis and gallbladder distention without associated pericholecystic inflammation. Surgery evaluated pt and admitted to their service patient stayed for 4 day course received endoscopy with stone removal. Reports pain returned in same place as last visit.     Pt denies fever, chills, vomiting, SOB, chest pain, heart palpitations, dysuria, urinary frequency.     Meds propanolol (htn), jakafi (Ruxolitinib) for spleen.

## 2022-09-26 NOTE — REVIEW OF SYSTEMS
[Shortness Of Breath] : shortness of breath [Dyspnea on Exertion] : dyspnea on exertion [Nausea] : nausea [Negative] : Musculoskeletal [Wheezing] : no wheezing [Cough] : no cough [Abdominal Pain] : no abdominal pain [Constipation] : no constipation [Diarrhea] : diarrhea [Vomiting] : no vomiting [Heartburn] : no heartburn [Melena] : no melena

## 2022-09-26 NOTE — ED PROVIDER NOTE - CLINICAL SUMMARY MEDICAL DECISION MAKING FREE TEXT BOX
Impression: 63y/o F with PMH of biliary stent surgery 4 days ago, myelofibrosis polycythemia vera, and HTN, CKD (MWF) presents with constant RUQ non-radiating abd pain x 1 day. Their symptoms of RUQ abd pain in the setting of Pending sale to [facility] biliary stent procedure with exam findings of RUQ and RLQ tenderness are concerning for biliary stent obstruction and other gallbladder pathology.    Ordered labs, imaging, medications for diagnosis, management, and treatment.

## 2022-09-26 NOTE — PROGRESS NOTE ADULT - PROBLEM SELECTOR PROBLEM 5
9/26/2022      Thuy Castañeda   [address] [facility] [address]      Dear: Thuy Castañeda    Please read through the enclosed prep instructions at least 7 days prior to your procedure with Jeni Prabhakar M.D.  If you have any questions regarding the instructions, please call [phone] and ask for the doctor's office performing your procedure. .  Procedure and arrival times may occasionally change, you will receive a call 1-2 days prior to your procedure to confirm your arrival time and procedure time.    Date of Procedure: 03/27/23  Location of Procedure:  [address] [address]  Phone: [phone]      If you need any pre-op testing prior to your procedure, your primary physician's office will call you to schedule an appointment prior to your procedure.    COVID testing is not indicated at this time, based on your medical history and vaccination status.    If you need to cancel or reschedule your procedure, please call [phone] and ask for the doctor's office performing your procedure.  If you need to reschedule your procedure, please call at least 48 hours prior to your appointment.    Thank You,    Electronically signed by:  Jeni Prabhakar MD  [facility]   [facility]   [facility]  Dept Phone: [phone]     Buy the following items to prepare for your colonoscopy.    • 4 Dulcolax Laxative tablets 5mg  • 1 bottle MiraLAX (238 grams) or a generic form  • Gatorade or similar flavored water (Crystal Light) 64-ounce bottle. Buy any color except purple or red.  • 4 Gas Relief tablets.   All items can be found at your local pharmacy or local retail store. You do not need a prescription.    7 Days before your Colonoscopy  If you take a blood thinner (examples: Coumadin, Jantoven, warfarin, Xarelto, Eliquis, Pradaxa, Plavix, clopidogrel, Effient, Prasugrel, Brilinta,  Ticagrelor) you will need to:    • You will need to stop your ______N/A_______ prior to your procedure. Contact your doctor that manages your blood thinner. Blood thinners need to be stopped prior to your procedure to reduce your risk of bleeding. Your doctor will determine how many days before the procedure you need to stop your blood thinner.  • Stop taking Fish Oil (Omega 3), Iron supplements, and vitamins with iron  • If you take medicine for diabetes, call your family doctor for instructions. Ask how to take your medicine for diabetes the day before the test and the morning of the test.    3 Days before your Colonoscopy  • Do not eat any fruits (berries) or vegetables with seeds (cucumbers, tomatoes), peas, corn, popcorn, nuts, and beans (legumes) until after your procedure.  • Do not take Metamucil, Fibercon or any bulk-forming laxatives.  • Make plans to have someone bring you to the facility and take you home after the procedure. Due to the medicine you receive, you should not return to work and will not be able to drive for 24 hours.    1 Day before your Colonoscopy (if you are diabetic see attached diabetic instructions)  • Drink only clear liquids for breakfast, lunch and dinner. Drink at least 8 ounces every hour while awake. Drinking will help prevent dehydration from the bowel prep.  • Clear liquids include soft drinks (orange, ginger ale, cola, Sprite), Gatorade, Mannie Aid, strained fruit juices (no pulp) (apple, white grape, orange, lemonade), water, tea, coffee (no milk or creamer), low sodium broth, hard candy, Popsicles (no sherbet or fruit bars), plain gelatin (lemon, lime, orange).  • Do not drink anything colored red or purple.  • Do not eat solid foods.  • Do not drink alcoholic beverages.    Starting your colon prep (The day before your colonoscopy)  • At 3 P.M:  · Take 2 Dulcolax Laxative tablets and 2 gas relief tablets with 8 ounces of clear liquid.  · Mix the MiraLAX (238g) with Gatorade,  Crystal Light, Propel, or other clear liquid (64 ounces).    • At 4 P.M:  · Begin to drink the prepared solution and take 2 gas relief tablets. Drink 8 ounces every 10-15 minutes until all the solution is gone.    · At 6 P.M:  · Take 2 Dulcolax laxative tablets with 8 ounces of clear liquid.    Side-Effects that you may experience while preparing for your colonoscopy:    • Nausea and vomiting may occur. If you have these symptoms stop the prep for 20 minutes and then begin sipping slowly until complete. It is important that you finish the entire prep.    • The prep may begin working within one hour or may take a couple of hours.    • You may have loose watery stools throughout the night. Stay near a toilet.    • During the prep it is common to feel cold, chilled, light-headed, abdominal cramping or bloating.    The Day of your Colonoscopy:    If you are having brown stools or no results from the bowel prep by morning, call the facility where you are having your procedure. They will give you directions on your next steps.    • You may have some yellow liquid stools the morning of your prep. This is normal.  • STOP drinking clear liquids 4 hours before your scheduled procedure.  • Take your prescribed blood pressure, thyroid, long acting/chronic pain medicine (Methadone, MS Contin), Parkinson, seizure, and breathing medicine (including inhalers) the morning of your test with enough water to swallow your pills.     Anemia due to blood loss

## 2022-09-26 NOTE — ED PROVIDER NOTE - PROVIDER TOKENS
Cardiac Rehab     Aleyda Costa   [id number]   12/3/2020         Cardiac Rehab Phase Taught: Phase 1    Teaching Method: Verbal    Handouts: None    Educational Videos: None    Understanding:  Learning indicated by feedback and Verbalize understanding    Comments: pt in bed, review of postop cabg education including sternal precautions, IS, pain control. Pt voiced understanding. Spoke with bedside RN. Will follow    Total Time Spent:15mins            Cookie Smith RN          PROVIDER:[TOKEN:[[id number]],FOLLOWUP:[1-3 Days]]

## 2022-09-26 NOTE — ED ADULT NURSE REASSESSMENT NOTE - NS ED NURSE REASSESS COMMENT FT1
Placement of port confirmed via chest x-ray. Okay to access as per MD orders. Port accessed using sterile technique. #20 gauge needle in place. +blood return. Sterile dressing applied to site. Patient tolerated well. Specimens collected and sent to lab. Safety and comfort of patient maintained.

## 2022-09-26 NOTE — ED PROVIDER NOTE - CARE PROVIDER_API CALL
Gabriel Elizabeth (MD)  Surgery; Surgical Critical Care  [address], [address]  [place] [address]  Phone: [phone]  Fax: [phone]  Follow Up Time: 1-3 Days

## 2022-09-26 NOTE — ED PROVIDER NOTE - PATIENT PORTAL LINK FT
You can access the FollowMyHealth Patient Portal offered by [facility] by registering at the following website: [email or web address]. By joining [employer] [employer]’s FollowMyHealth portal, you will also be able to view your health information using other applications (apps) compatible with our system.

## 2022-09-27 VITALS
TEMPERATURE: 98 F | DIASTOLIC BLOOD PRESSURE: 70 MMHG | SYSTOLIC BLOOD PRESSURE: 117 MMHG | RESPIRATION RATE: 16 BRPM | HEART RATE: 85 BPM | OXYGEN SATURATION: 98 %

## 2022-09-27 LAB
CULTURE RESULTS: SIGNIFICANT CHANGE UP
CULTURE RESULTS: SIGNIFICANT CHANGE UP
SARS-COV-2 RNA SPEC QL NAA+PROBE: SIGNIFICANT CHANGE UP
SPECIMEN SOURCE: SIGNIFICANT CHANGE UP
SPECIMEN SOURCE: SIGNIFICANT CHANGE UP

## 2022-09-27 PROCEDURE — 85025 COMPLETE CBC W/AUTO DIFF WBC: CPT

## 2022-09-27 PROCEDURE — 99284 EMERGENCY DEPT VISIT MOD MDM: CPT | Mod: 25

## 2022-09-27 PROCEDURE — 84132 ASSAY OF SERUM POTASSIUM: CPT

## 2022-09-27 PROCEDURE — 82330 ASSAY OF CALCIUM: CPT

## 2022-09-27 PROCEDURE — 84295 ASSAY OF SERUM SODIUM: CPT

## 2022-09-27 PROCEDURE — 71045 X-RAY EXAM CHEST 1 VIEW: CPT

## 2022-09-27 PROCEDURE — 80053 COMPREHEN METABOLIC PANEL: CPT

## 2022-09-27 PROCEDURE — 83690 ASSAY OF LIPASE: CPT

## 2022-09-27 PROCEDURE — 85018 HEMOGLOBIN: CPT

## 2022-09-27 PROCEDURE — 76700 US EXAM ABDOM COMPLETE: CPT

## 2022-09-27 PROCEDURE — 82803 BLOOD GASES ANY COMBINATION: CPT

## 2022-09-27 PROCEDURE — 76700 US EXAM ABDOM COMPLETE: CPT | Mod: 26

## 2022-09-27 PROCEDURE — 82435 ASSAY OF BLOOD CHLORIDE: CPT

## 2022-09-27 PROCEDURE — 82947 ASSAY GLUCOSE BLOOD QUANT: CPT

## 2022-09-27 PROCEDURE — 36415 COLL VENOUS BLD VENIPUNCTURE: CPT

## 2022-09-27 PROCEDURE — 74177 CT ABD & PELVIS W/CONTRAST: CPT | Mod: MA

## 2022-09-27 PROCEDURE — 87635 SARS-COV-2 COVID-19 AMP PRB: CPT

## 2022-09-27 PROCEDURE — 85014 HEMATOCRIT: CPT

## 2022-09-27 PROCEDURE — 83605 ASSAY OF LACTIC ACID: CPT

## 2022-09-27 NOTE — CONSULT NOTE ADULT - SUBJECTIVE AND OBJECTIVE BOX
HPI: 63 y/o F w/ PMH myelofibrosis, polycythemia vera, ESRD on MFW schedule via LUE AVF, portal HTN, subclavian port for transfusions, gastric varices, gallstones who presents with 1 day of abdominal pain in the epigastrium. The patient was in her usual state of health when she began having some abdominal pain while cooking. She maintains she has never had this type of pain before. Of note, she was recently here one week ago for choledocholithiasis under Dr. Gabriel Elizabeth and was taken for ERCP and stent placement. This course was c/b post-ERCP pancreatitis which led to delay of cholecystectomy on that admission. Her last BM was in the ED at time of visit. Daughter at bedside translating.     In the ED she is hemodynamically stable. Labs are significant for elevated creatinine and anemia to Hgb 8.7. LFT's wnl. Lactate wnl. CT A/P w/ IV contrast significant for splenomegaly (known) as well as ileus. RUQ U/S w/o evidence of acute cholecystitis.    The patient denies use of alcohol or fatty food after being discharged from the hospital recently. Last HD today, only partially completed 2/2 abdominal pain.     PMH: As above    PSH: None    Allergies: None    Medications: Propranolol, Danozol, Jakafi, Calcium acetate    Physical exam:    Vital signs:    /76 HR 87 SpO2 99 T 98.7  RR 18    General- NAD. Occitan speaking lady. Non jaundiced. Mentating appropriately  Abdomen- Mildly tender across all quadrants. Mildly distended  Lungs- Breathing comfortably on room air     Imaging:     ACC: [id number] EXAM: CT ABDOMEN AND PELVIS IC    PROCEDURE DATE: 09/26/2022        INTERPRETATION: CLINICAL INFORMATION: Right upper quadrant abdominal pain, status post endoscopic biliary stent placement 9/23/2022.    COMPARISON: CT abdomen pelvis dated 9/21/2022. Abdominal sonography dated 9/26/2022.    CONTRAST/COMPLICATIONS:  IV Contrast: Omnipaque 350 90 cc administered 0 cc discarded  Oral Contrast: NONE  Complications: None reported at time of study completion    PROCEDURE:  CT of the Abdomen and Pelvis was performed.  Sagittal and coronal reformats were performed.    FINDINGS:  LOWER CHEST: Mild bibasilar scarring/atelectasis. Partially imaged port tip near the superior cavoatrial junction.    LIVER: Within normal limits.  BILE DUCTS: Postprocedural pneumobilia. There is been interval placement of a biliary stent extending from the common bile duct to the second portion of the duodenum.  GALLBLADDER: Antidependent air is present within the gallbladder lumen. There is mild gallbladder wall thickening and trace pericholecystic stranding. Residual gallstones are present in the gallbladder fundus.  SPLEEN: Markedly enlarged.  PANCREAS: Within normal limits.  ADRENALS: Within normal limits.  KIDNEYS/URETERS: Atrophic kidneys bilaterally with subcentimeter hypodensities, too small to characterize. No hydronephrosis.    BLADDER: Minimally distended.  REPRODUCTIVE ORGANS: Uterus and adnexa within normal limits. Retroverted uterus.    BOWEL: There are multiple dilated loops of small bowel without a discrete transition point. No bowel wall hypoenhancement or pneumatosis to suggest bowel ischemia. Appendix is normal.  PERITONEUM: No ascites.  VESSELS: Multiple upper abdominal varices are present, most prominently along the greater curvature of the stomach and at the splenic hilum.  RETROPERITONEUM/LYMPH NODES: No lymphadenopathy.  ABDOMINAL WALL: A 1.1 cm subcutaneous nodule is present in the anterior right abdominal wall, which could be related to subcutaneous injection. Small fat-containing umbilical hernia.  BONES: Degenerative changes.    IMPRESSION:  Interval biliary stent placement with expected postprocedural pneumobilia.    Dilated small bowel favored to represent ileus in this context.    --- End of Report ---         HPI: 63 y/o F w/ PMH myelofibrosis, polycythemia vera, ESRD on MFW schedule via LUE AVF, portal HTN, subclavian port for transfusions, gastric varices, gallstones who presents with 1 day of abdominal pain in the epigastrium. The patient was in her usual state of health when she began having some abdominal pain while cooking. She maintains she has never had this type of pain before. Of note, she was recently here one week ago for choledocholithiasis under Dr. Gabriel Elizabeth and was taken for ERCP and stent placement. This course was c/b post-ERCP pancreatitis which led to delay of cholecystectomy on that admission.     In the ED she is hemodynamically stable. Labs are significant for elevated creatinine and anemia to Hgb 8.7. LFT's wnl. Lactate wnl. CT A/P w/ IV contrast significant for splenomegaly (known) as well as ileus. RUQ U/S w/o evidence of acute cholecystitis.    The patient denies use of alcohol or fatty food after being discharged from the hospital recently. Last HD today, only partially completed 2/2 abdominal pain. Her last BM was in the ED at time of visit. Daughter at bedside translating.     PMH: As above    PSH: None    Allergies: None    Medications: Propranolol, Danozol, Jakafi, Calcium acetate    Physical exam:    Vital signs:    /76 HR 87 SpO2 99 T 98.7  RR 18    General- NAD. Upper sorbian speaking lady. Non jaundiced. Mentating appropriately  Abdomen- Mildly tender across all quadrants. Mildly distended  Lungs- Breathing comfortably on room air     Imaging:     ACC: [id number] EXAM: CT ABDOMEN AND PELVIS IC    PROCEDURE DATE: 09/26/2022        INTERPRETATION: CLINICAL INFORMATION: Right upper quadrant abdominal pain, status post endoscopic biliary stent placement 9/23/2022.    COMPARISON: CT abdomen pelvis dated 9/21/2022. Abdominal sonography dated 9/26/2022.    CONTRAST/COMPLICATIONS:  IV Contrast: Omnipaque 350 90 cc administered 0 cc discarded  Oral Contrast: NONE  Complications: None reported at time of study completion    PROCEDURE:  CT of the Abdomen and Pelvis was performed.  Sagittal and coronal reformats were performed.    FINDINGS:  LOWER CHEST: Mild bibasilar scarring/atelectasis. Partially imaged port tip near the superior cavoatrial junction.    LIVER: Within normal limits.  BILE DUCTS: Postprocedural pneumobilia. There is been interval placement of a biliary stent extending from the common bile duct to the second portion of the duodenum.  GALLBLADDER: Antidependent air is present within the gallbladder lumen. There is mild gallbladder wall thickening and trace pericholecystic stranding. Residual gallstones are present in the gallbladder fundus.  SPLEEN: Markedly enlarged.  PANCREAS: Within normal limits.  ADRENALS: Within normal limits.  KIDNEYS/URETERS: Atrophic kidneys bilaterally with subcentimeter hypodensities, too small to characterize. No hydronephrosis.    BLADDER: Minimally distended.  REPRODUCTIVE ORGANS: Uterus and adnexa within normal limits. Retroverted uterus.    BOWEL: There are multiple dilated loops of small bowel without a discrete transition point. No bowel wall hypoenhancement or pneumatosis to suggest bowel ischemia. Appendix is normal.  PERITONEUM: No ascites.  VESSELS: Multiple upper abdominal varices are present, most prominently along the greater curvature of the stomach and at the splenic hilum.  RETROPERITONEUM/LYMPH NODES: No lymphadenopathy.  ABDOMINAL WALL: A 1.1 cm subcutaneous nodule is present in the anterior right abdominal wall, which could be related to subcutaneous injection. Small fat-containing umbilical hernia.  BONES: Degenerative changes.    IMPRESSION:  Interval biliary stent placement with expected postprocedural pneumobilia.    Dilated small bowel favored to represent ileus in this context.    --- End of Report ---

## 2022-09-27 NOTE — ED ADULT NURSE REASSESSMENT NOTE - NS ED NURSE REASSESS COMMENT FT1
infusaport de-acessed after heparin flush without any difficulty; pt tolerated procedure well; dry sterile dressing placed on site.

## 2022-09-27 NOTE — CONSULT NOTE ADULT - ASSESSMENT
63 y/o F w/ PMH myelofibrosis, choledocholithiasis s/p CBD stent here w/ ileus.      -No acute surgical intervention  -Patient has bowl function in ED, without nausea or vomiting  -PO challenge  -Will need eventual cholecystectomy electively as o/p  -Followup with Dr. Gabriel Elizabeth in clinic    D/w Dr. Wallace and seen with senior resident    Jackie Rm MD  PGY-2  Trauma Surgery  #[id number] 61 y/o F w/ PMH myelofibrosis, choledocholithiasis s/p CBD stent here w/ ileus, likely from previous or partially resolved pancreatitis.      -No acute surgical intervention  -Patient has bowl function in ED, without nausea or vomiting  -PO challenge  -Will need eventual cholecystectomy electively as o/p  -Followup with Dr. Gabriel Elizabeth in clinic    D/w Dr. Wallace and seen with senior resident    Jackie Rm MD  PGY-2  Trauma Surgery  #[id number]

## 2022-09-29 DIAGNOSIS — Z09 ENCOUNTER FOR FOLLOW-UP EXAMINATION AFTER COMPLETED TREATMENT FOR CONDITIONS OTHER THAN MALIGNANT NEOPLASM: ICD-10-CM

## 2022-09-29 DIAGNOSIS — Z29.8 ENCOUNTER FOR OTHER SPECIFIED PROPHYLACTIC MEASURES: ICD-10-CM

## 2022-09-30 ENCOUNTER — INPATIENT (INPATIENT)
Facility: HOSPITAL | Age: 62
LOS: 17 days | Discharge: HOME | DRG: 414 | End: 2022-10-18
Attending: TRANSPLANT SURGERY | Admitting: STUDENT IN AN ORGANIZED HEALTH CARE EDUCATION/TRAINING PROGRAM
Payer: MEDICAID

## 2022-09-30 VITALS
RESPIRATION RATE: 20 BRPM | HEIGHT: 62 IN | TEMPERATURE: 101 F | WEIGHT: 130.07 LBS | SYSTOLIC BLOOD PRESSURE: 124 MMHG | OXYGEN SATURATION: 98 % | DIASTOLIC BLOOD PRESSURE: 78 MMHG | HEART RATE: 88 BPM

## 2022-09-30 DIAGNOSIS — I77.0 ARTERIOVENOUS FISTULA, ACQUIRED: Chronic | ICD-10-CM

## 2022-09-30 DIAGNOSIS — T85.898A OTHER SPECIFIED COMPLICATION OF OTHER INTERNAL PROSTHETIC DEVICES, IMPLANTS AND GRAFTS, INITIAL ENCOUNTER: Chronic | ICD-10-CM

## 2022-09-30 DIAGNOSIS — K83.8 OTHER SPECIFIED DISEASES OF BILIARY TRACT: ICD-10-CM

## 2022-09-30 DIAGNOSIS — N18.6 END STAGE RENAL DISEASE: ICD-10-CM

## 2022-09-30 DIAGNOSIS — Z99.2 DEPENDENCE ON RENAL DIALYSIS: Chronic | ICD-10-CM

## 2022-09-30 DIAGNOSIS — Z87.39 PERSONAL HISTORY OF OTHER DISEASES OF THE MUSCULOSKELETAL SYSTEM AND CONNECTIVE TISSUE: ICD-10-CM

## 2022-09-30 DIAGNOSIS — Z29.9 ENCOUNTER FOR PROPHYLACTIC MEASURES, UNSPECIFIED: ICD-10-CM

## 2022-09-30 DIAGNOSIS — R10.9 UNSPECIFIED ABDOMINAL PAIN: ICD-10-CM

## 2022-09-30 DIAGNOSIS — I86.4 GASTRIC VARICES: ICD-10-CM

## 2022-09-30 DIAGNOSIS — D69.6 THROMBOCYTOPENIA, UNSPECIFIED: ICD-10-CM

## 2022-09-30 LAB
ALBUMIN SERPL ELPH-MCNC: 3.7 G/DL — SIGNIFICANT CHANGE UP (ref 3.3–5)
ALP SERPL-CCNC: 84 U/L — SIGNIFICANT CHANGE UP (ref 40–120)
ALT FLD-CCNC: 17 U/L — SIGNIFICANT CHANGE UP (ref 10–45)
ANION GAP SERPL CALC-SCNC: 15 MMOL/L — SIGNIFICANT CHANGE UP (ref 5–17)
ANISOCYTOSIS BLD QL: SLIGHT — SIGNIFICANT CHANGE UP
APTT BLD: 43.8 SEC — HIGH (ref 27.5–35.5)
AST SERPL-CCNC: 25 U/L — SIGNIFICANT CHANGE UP (ref 10–40)
BASE EXCESS BLDV CALC-SCNC: 1.9 MMOL/L — SIGNIFICANT CHANGE UP (ref -2–3)
BASOPHILS # BLD AUTO: 0.08 K/UL — SIGNIFICANT CHANGE UP (ref 0–0.2)
BASOPHILS NFR BLD AUTO: 1.7 % — SIGNIFICANT CHANGE UP (ref 0–2)
BILIRUB SERPL-MCNC: 1.4 MG/DL — HIGH (ref 0.2–1.2)
BLD GP AB SCN SERPL QL: NEGATIVE — SIGNIFICANT CHANGE UP
BUN SERPL-MCNC: 24 MG/DL — HIGH (ref 7–23)
CA-I SERPL-SCNC: 1.08 MMOL/L — LOW (ref 1.15–1.33)
CALCIUM SERPL-MCNC: 8.3 MG/DL — LOW (ref 8.4–10.5)
CHLORIDE BLDV-SCNC: 96 MMOL/L — SIGNIFICANT CHANGE UP (ref 96–108)
CHLORIDE SERPL-SCNC: 92 MMOL/L — LOW (ref 96–108)
CO2 BLDV-SCNC: 28 MMOL/L — HIGH (ref 22–26)
CO2 SERPL-SCNC: 23 MMOL/L — SIGNIFICANT CHANGE UP (ref 22–31)
CREAT SERPL-MCNC: 7.13 MG/DL — HIGH (ref 0.5–1.3)
DACRYOCYTES BLD QL SMEAR: SIGNIFICANT CHANGE UP
EGFR: 6 ML/MIN/1.73M2 — LOW
ELLIPTOCYTES BLD QL SMEAR: SLIGHT — SIGNIFICANT CHANGE UP
EOSINOPHIL # BLD AUTO: 0.08 K/UL — SIGNIFICANT CHANGE UP (ref 0–0.5)
EOSINOPHIL NFR BLD AUTO: 1.7 % — SIGNIFICANT CHANGE UP (ref 0–6)
FLUAV AG NPH QL: SIGNIFICANT CHANGE UP
FLUBV AG NPH QL: SIGNIFICANT CHANGE UP
GAS PNL BLDV: 130 MMOL/L — LOW (ref 136–145)
GAS PNL BLDV: SIGNIFICANT CHANGE UP
GAS PNL BLDV: SIGNIFICANT CHANGE UP
GLUCOSE BLDV-MCNC: 106 MG/DL — HIGH (ref 70–99)
GLUCOSE SERPL-MCNC: 108 MG/DL — HIGH (ref 70–99)
HCO3 BLDV-SCNC: 27 MMOL/L — SIGNIFICANT CHANGE UP (ref 22–29)
HCT VFR BLD CALC: 22.9 % — LOW (ref 34.5–45)
HCT VFR BLDA CALC: 18 % — CRITICAL LOW (ref 34.5–46.5)
HGB BLD CALC-MCNC: 6 G/DL — CRITICAL LOW (ref 11.7–16.1)
HGB BLD-MCNC: 7 G/DL — CRITICAL LOW (ref 11.5–15.5)
INR BLD: 1.23 RATIO — HIGH (ref 0.88–1.16)
LACTATE BLDV-MCNC: 0.5 MMOL/L — SIGNIFICANT CHANGE UP (ref 0.5–2)
LIDOCAIN IGE QN: 46 U/L — SIGNIFICANT CHANGE UP (ref 7–60)
LYMPHOCYTES # BLD AUTO: 0.43 K/UL — LOW (ref 1–3.3)
LYMPHOCYTES # BLD AUTO: 8.7 % — LOW (ref 13–44)
MACROCYTES BLD QL: SLIGHT — SIGNIFICANT CHANGE UP
MANUAL SMEAR VERIFICATION: SIGNIFICANT CHANGE UP
MCHC RBC-ENTMCNC: 30.4 PG — SIGNIFICANT CHANGE UP (ref 27–34)
MCHC RBC-ENTMCNC: 30.6 GM/DL — LOW (ref 32–36)
MCV RBC AUTO: 99.6 FL — SIGNIFICANT CHANGE UP (ref 80–100)
MONOCYTES # BLD AUTO: 0.22 K/UL — SIGNIFICANT CHANGE UP (ref 0–0.9)
MONOCYTES NFR BLD AUTO: 4.4 % — SIGNIFICANT CHANGE UP (ref 2–14)
NEUTROPHILS # BLD AUTO: 4.15 K/UL — SIGNIFICANT CHANGE UP (ref 1.8–7.4)
NEUTROPHILS NFR BLD AUTO: 74.8 % — SIGNIFICANT CHANGE UP (ref 43–77)
NEUTS BAND # BLD: 8.7 % — HIGH (ref 0–8)
NRBC # BLD: 1 /100 — HIGH (ref 0–0)
PCO2 BLDV: 41 MMHG — SIGNIFICANT CHANGE UP (ref 39–42)
PH BLDV: 7.42 — SIGNIFICANT CHANGE UP (ref 7.32–7.43)
PLAT MORPH BLD: NORMAL — SIGNIFICANT CHANGE UP
PLATELET # BLD AUTO: 114 K/UL — LOW (ref 150–400)
PO2 BLDV: 41 MMHG — SIGNIFICANT CHANGE UP (ref 25–45)
POIKILOCYTOSIS BLD QL AUTO: SIGNIFICANT CHANGE UP
POLYCHROMASIA BLD QL SMEAR: SLIGHT — SIGNIFICANT CHANGE UP
POTASSIUM BLDV-SCNC: 4.2 MMOL/L — SIGNIFICANT CHANGE UP (ref 3.5–5.1)
POTASSIUM SERPL-MCNC: 4.3 MMOL/L — SIGNIFICANT CHANGE UP (ref 3.5–5.3)
POTASSIUM SERPL-SCNC: 4.3 MMOL/L — SIGNIFICANT CHANGE UP (ref 3.5–5.3)
PROT SERPL-MCNC: 7 G/DL — SIGNIFICANT CHANGE UP (ref 6–8.3)
PROTHROM AB SERPL-ACNC: 14.3 SEC — HIGH (ref 10.5–13.4)
RBC # BLD: 2.3 M/UL — LOW (ref 3.8–5.2)
RBC # FLD: 18.3 % — HIGH (ref 10.3–14.5)
RBC BLD AUTO: ABNORMAL
RH IG SCN BLD-IMP: POSITIVE — SIGNIFICANT CHANGE UP
RSV RNA NPH QL NAA+NON-PROBE: SIGNIFICANT CHANGE UP
SAO2 % BLDV: 76.2 % — SIGNIFICANT CHANGE UP (ref 67–88)
SARS-COV-2 RNA SPEC QL NAA+PROBE: SIGNIFICANT CHANGE UP
SODIUM SERPL-SCNC: 130 MMOL/L — LOW (ref 135–145)
WBC # BLD: 4.97 K/UL — SIGNIFICANT CHANGE UP (ref 3.8–10.5)
WBC # FLD AUTO: 4.97 K/UL — SIGNIFICANT CHANGE UP (ref 3.8–10.5)

## 2022-09-30 PROCEDURE — 99222 1ST HOSP IP/OBS MODERATE 55: CPT

## 2022-09-30 PROCEDURE — 76700 US EXAM ABDOM COMPLETE: CPT | Mod: 26

## 2022-09-30 PROCEDURE — 99223 1ST HOSP IP/OBS HIGH 75: CPT

## 2022-09-30 PROCEDURE — 71045 X-RAY EXAM CHEST 1 VIEW: CPT | Mod: 26

## 2022-09-30 PROCEDURE — 99285 EMERGENCY DEPT VISIT HI MDM: CPT

## 2022-09-30 PROCEDURE — 99222 1ST HOSP IP/OBS MODERATE 55: CPT | Mod: GC

## 2022-09-30 PROCEDURE — 74177 CT ABD & PELVIS W/CONTRAST: CPT | Mod: 26,MA

## 2022-09-30 RX ORDER — RUXOLITINIB 25 MG/1
0 TABLET ORAL
Qty: 0 | Refills: 0 | DISCHARGE

## 2022-09-30 RX ORDER — DANAZOL 200 MG/1
200 CAPSULE ORAL THREE TIMES A DAY
Refills: 0 | Status: COMPLETED | OUTPATIENT
Start: 2022-09-30 | End: 2022-10-07

## 2022-09-30 RX ORDER — PIPERACILLIN AND TAZOBACTAM 4; .5 G/20ML; G/20ML
3.38 INJECTION, POWDER, LYOPHILIZED, FOR SOLUTION INTRAVENOUS ONCE
Refills: 0 | Status: COMPLETED | OUTPATIENT
Start: 2022-09-30 | End: 2022-09-30

## 2022-09-30 RX ORDER — ERYTHROPOIETIN 10000 [IU]/ML
20000 INJECTION, SOLUTION INTRAVENOUS; SUBCUTANEOUS
Refills: 0 | Status: DISCONTINUED | OUTPATIENT
Start: 2022-09-30 | End: 2022-10-12

## 2022-09-30 RX ORDER — SODIUM CHLORIDE 9 MG/ML
250 INJECTION INTRAMUSCULAR; INTRAVENOUS; SUBCUTANEOUS ONCE
Refills: 0 | Status: COMPLETED | OUTPATIENT
Start: 2022-09-30 | End: 2022-09-30

## 2022-09-30 RX ORDER — ACETAMINOPHEN 500 MG
1000 TABLET ORAL ONCE
Refills: 0 | Status: COMPLETED | OUTPATIENT
Start: 2022-09-30 | End: 2022-09-30

## 2022-09-30 RX ORDER — RUXOLITINIB 25 MG/1
15 TABLET ORAL
Qty: 0 | Refills: 0 | DISCHARGE

## 2022-09-30 RX ORDER — PIPERACILLIN AND TAZOBACTAM 4; .5 G/20ML; G/20ML
3.38 INJECTION, POWDER, LYOPHILIZED, FOR SOLUTION INTRAVENOUS ONCE
Refills: 0 | Status: DISCONTINUED | OUTPATIENT
Start: 2022-09-30 | End: 2022-09-30

## 2022-09-30 RX ORDER — PIPERACILLIN AND TAZOBACTAM 4; .5 G/20ML; G/20ML
3.38 INJECTION, POWDER, LYOPHILIZED, FOR SOLUTION INTRAVENOUS EVERY 12 HOURS
Refills: 0 | Status: DISCONTINUED | OUTPATIENT
Start: 2022-09-30 | End: 2022-10-04

## 2022-09-30 RX ORDER — ACETAMINOPHEN 500 MG
650 TABLET ORAL ONCE
Refills: 0 | Status: COMPLETED | OUTPATIENT
Start: 2022-09-30 | End: 2022-09-30

## 2022-09-30 RX ORDER — CALCIUM ACETATE 667 MG
1334 TABLET ORAL
Refills: 0 | Status: DISCONTINUED | OUTPATIENT
Start: 2022-09-30 | End: 2022-10-05

## 2022-09-30 RX ORDER — RUXOLITINIB 25 MG/1
15 TABLET ORAL
Refills: 0 | Status: DISCONTINUED | OUTPATIENT
Start: 2022-09-30 | End: 2022-10-01

## 2022-09-30 RX ORDER — HYDROMORPHONE HYDROCHLORIDE 2 MG/ML
0.5 INJECTION INTRAMUSCULAR; INTRAVENOUS; SUBCUTANEOUS ONCE
Refills: 0 | Status: DISCONTINUED | OUTPATIENT
Start: 2022-09-30 | End: 2022-09-30

## 2022-09-30 RX ADMIN — HYDROMORPHONE HYDROCHLORIDE 0.5 MILLIGRAM(S): 2 INJECTION INTRAMUSCULAR; INTRAVENOUS; SUBCUTANEOUS at 14:23

## 2022-09-30 RX ADMIN — HYDROMORPHONE HYDROCHLORIDE 0.5 MILLIGRAM(S): 2 INJECTION INTRAMUSCULAR; INTRAVENOUS; SUBCUTANEOUS at 12:00

## 2022-09-30 RX ADMIN — Medication 1000 MILLIGRAM(S): at 21:40

## 2022-09-30 RX ADMIN — PIPERACILLIN AND TAZOBACTAM 3.38 GRAM(S): 4; .5 INJECTION, POWDER, LYOPHILIZED, FOR SOLUTION INTRAVENOUS at 13:31

## 2022-09-30 RX ADMIN — SODIUM CHLORIDE 250 MILLILITER(S): 9 INJECTION INTRAMUSCULAR; INTRAVENOUS; SUBCUTANEOUS at 11:48

## 2022-09-30 RX ADMIN — Medication 400 MILLIGRAM(S): at 20:58

## 2022-09-30 RX ADMIN — PIPERACILLIN AND TAZOBACTAM 200 GRAM(S): 4; .5 INJECTION, POWDER, LYOPHILIZED, FOR SOLUTION INTRAVENOUS at 11:27

## 2022-09-30 RX ADMIN — ERYTHROPOIETIN 20000 UNIT(S): 10000 INJECTION, SOLUTION INTRAVENOUS; SUBCUTANEOUS at 20:58

## 2022-09-30 RX ADMIN — SODIUM CHLORIDE 250 MILLILITER(S): 9 INJECTION INTRAMUSCULAR; INTRAVENOUS; SUBCUTANEOUS at 13:31

## 2022-09-30 NOTE — H&P ADULT - PROBLEM SELECTOR PLAN 3
- Bi monthly transfusions at [facility]. Followed by Dr. Benjy Guo  - Hgb 7.0 baseline high 8's/low 9's based on outpatient labs  - Continue Danazol  - Heme consult  - 1unit transfusion with dialysis tonight

## 2022-09-30 NOTE — H&P ADULT - NSHPPHYSICALEXAM_GEN_ALL_CORE
VITALS:   T(C): 37.4 (09-30-22 @ 15:35), Max: 38.1 (09-30-22 @ 09:40)  HR: 87 (09-30-22 @ 15:35) (81 - 88)  BP: 134/74 (09-30-22 @ 15:35) (116/57 - 134/74)  RR: 17 (09-30-22 @ 15:35) (17 - 20)  SpO2: 98% (09-30-22 @ 15:35) (98% - 98%)    GENERAL: NAD  HEAD:  Atraumatic, Normocephalic  EYES: EOMI, PERRLA, conjunctiva and sclera clear  ENT: Moist mucous membranes  NECK: Supple, No JVD  CHEST/LUNG: Clear to auscultation bilaterally; Unlabored respirations  HEART: Regular rate and rhythm; No murmurs.  Peripheral edema: none  ABDOMEN: BSx4; Soft, nontender, nondistended  EXTREMITIES:  2+ radial pulses, 2+ dorsalis pedis, brisk capillary refill. No clubbing, cyanosis, or edema  NERVOUS SYSTEM:  A&Ox3, no gross lateralizing deficits   SKIN: No rashes or lesions VITALS:   T(C): 37.4 (09-30-22 @ 15:35), Max: 38.1 (09-30-22 @ 09:40)  HR: 87 (09-30-22 @ 15:35) (81 - 88)  BP: 134/74 (09-30-22 @ 15:35) (116/57 - 134/74)  RR: 17 (09-30-22 @ 15:35) (17 - 20)  SpO2: 98% (09-30-22 @ 15:35) (98% - 98%)    GENERAL: NAD  HEAD:  Atraumatic, Normocephalic  EYES: EOMI, PERRLA, conjunctiva and sclera clear  ENT: Moist mucous membranes  NECK: Supple, No JVD  CHEST/LUNG: Clear to auscultation bilaterally; Unlabored respirations  HEART: Regular rate and rhythm; No murmurs.  Peripheral edema: none  ABDOMEN: RUQ and epigastric tenderness with hightower sign. Also has right flank tenderness which she says she has had since she was in her 20's.  EXTREMITIES:  2+ radial pulses, 2+ dorsalis pedis. AVF patent.  NERVOUS SYSTEM:  A&Ox3, no gross lateralizing deficits   SKIN: No rashes or lesions

## 2022-09-30 NOTE — ED PROVIDER NOTE - PROGRESS NOTE DETAILS
CMP consistent with hx ESRD, pt anemic to 7. However given hx of ESRD and concern for fluid overload, will defer pRBC administration to nephro (possible administration in conjunction with HD). US shows biliary sludge, no sign of acute vikas. Also wbc not elevated. Surgery saw pt, believes pain not likely from GB, thinks it may be from cirrhosis. States they recommend medicine admission for hepatology consult for cirrhosis, surgery to follow as inpatient.

## 2022-09-30 NOTE — H&P ADULT - HISTORY OF PRESENT ILLNESS
63 yo F hx of myelofibrosis, polycythemia vera, ESRD on MFW schedule via LUE AVF (last dialysis W), portal HTN, subclavian port for transfusions, gastric varices, gallstones, recent ERCP for choledocolithiasis s/p biliary stent, c/b post ERCP pancreatitis + pneumobilia now presenting with fever and RUQ and epigastric pain. States pain has been ongoing for past 2 weeks, and has been in ER 3 times over this time. [place] chills last night, noted ot be febrile this morning. Nausea and few episodes of nbnb emesis this AM. 63 yo F hx of myelofibrosis, polycythemia vera, ESRD on MFW schedule via LUE AVF (last dialysis W), portal HTN, subclavian port for transfusions, gastric varices, gallstones, recent ERCP for choledocolithiasis s/p biliary stent, c/b post ERCP pancreatitis + pneumobilia now presenting with fever and RUQ and epigastric pain. States pain has been ongoing for past 2 weeks, and has been in ER 3 times over this time. [place] chills last night, noted to be febrile this morning. Nausea and few episodes of nbnb emesis this AM.    ED Course:  Pt was given zosyn and blood cultures obtained. U/S abdomen showed no acute pathology, CBD stent with pneumobilia, marked splenomegaly with upper abdominal varices. CT CA/P with contrast showed the same. No imaging evidence of cirrhosis. General surgery with no intervention. Nephrology also consulted will dialyze as per schedule. 63 yo F hx of myelofibrosis, polycythemia vera, ESRD on MFW schedule via LUE AVF (last dialysis W), portal HTN, subclavian port for transfusions, gastric varices, gallstones, recent ERCP for choledocolithiasis s/p biliary stent, c/b post ERCP pancreatitis + pneumobilia now presenting with fever and RUQ and epigastric pain. States pain has been ongoing for past 2 weeks, and has been in ER 3 times over this time. [place] chills last night, noted to be febrile this morning. Nausea and few episodes of nbnb emesis this AM.    ED Course:  Febrile to 100.5. Normal HR and BP. Pt was given tylenol, zosyn, and blood cultures obtained. U/S abdomen showed no acute pathology, CBD stent with pneumobilia, marked splenomegaly with upper abdominal varices. CT CA/P with contrast showed the same. No imaging evidence of cirrhosis. General surgery with no intervention. Nephrology also consulted will dialyze as per schedule.    Admitted to medicine for further management.

## 2022-09-30 NOTE — CONSULT NOTE ADULT - ASSESSMENT
62F Estonian speaking PMHx myelofibrosis polycythemia on HD (AVF on LUE, MWF), hx of portal HTN w/o cirrhosis, recent admission for choledocholithiasis presenting with persistent pain.     Impression: unlikely that pt's pain is related to her gallbladder given time course and lack of imaging findings. More likely pt's pain is related to underlying liver disease.    Plan:  - No surgical intervention. Pt is not a good operative candidate at this time, and morbidity with cirrhosis and portal hypertension is significantly increased.  - If pt is unable to tolerate PO/severe pain, recommend medicine admission for further workup  - Recommend hepatology evaluation    ACS/Trauma Surgery  [id number]

## 2022-09-30 NOTE — ED PROVIDER NOTE - OBJECTIVE STATEMENT
63 yo F hx of myelofibrosis, polycythemia vera, ESRD on MFW schedule via LUE AVF (last dialysis W), portal HTN, subclavian port for transfusions, gastric varices, gallstones, recent ERCP for choledocolithiasis s/p biliary stent, c/b post ERCP pancreatitis now presenting with fever and RUQ and epigastric pain. States pain has been ongoing for past 2 weeks, and has been in ER 3 times over this time. [place] chills last night, noted ot be febrile this morning. Nausea and few episodes of nbnb emesis this AM. Endorses normal BMs. No cp, sob. Makes minimal urine output still but denies lower abd pain or urinary symptoms. No cough, sputum production, HA, neck stiffness, rash, recent travel, sick contacts, myalgias.

## 2022-09-30 NOTE — CONSULT NOTE ADULT - ASSESSMENT
61 yo F hx of myelofibrosis, polycythemia vera, ESRD on MWf schedule via LUE AVF (last dialysis W), portal HTN, subclavian port for transfusions, gastric varices, gallstones, recent ERCP for choledocolithiasis s/p biliary stent, c/b post ERCP pancreatitis now presenting with fever and RUQ and epigastric pain, and fever     ESRD- Will dialyze today per MWF schedule. no fluid removal at this time. - 3 hours     abdominal pain/ fever- CT abdomen. sepsis work up.      anemia- on EPO 20,000 Units tiw- will order.     tessy boggs  nephrology attending   please contact me on TEAMS   Office- [phone]

## 2022-09-30 NOTE — ED PROVIDER NOTE - CLINICAL SUMMARY MEDICAL DECISION MAKING FREE TEXT BOX
Adilene MENDEZ) - 63 yo F hx of myelofibrosis, polycythemia vera, ESRD on MFW schedule via LUE AVF (last dialysis W), portal HTN, subclavian port for transfusions, gastric varices, gallstones, recent ERCP for choledocolithiasis s/p biliary stent, c/b post ERCP pancreatitis now presenting with fever and RUQ and epigastric pain. States pain has been ongoing for past 2 weeks, and has been in ER 3 times over this time. [place] chills last night, noted ot be febrile this morning. Nausea and few episodes of nbnb emesis this AM. Endorses normal BMs. No cp, sob. Makes minimal urine output still but denies lower abd pain or urinary symptoms. No cough, sputum production, HA, neck stiffness, rash, recent travel, sick contacts, myalgias.   On exam, pt febrile but remainder of vitals wnl. HR < 90 at traige but in 90s on monitor so fulfills sepsis criteria, will get septic workup and give zosyn. Has RUQ pain on exam and epigastric pain. Concern for GB etiology including cholecystitis and acute cholangitis. Will get CT AP to assess biliary stent as well as RUQ US. Will give tylenol, small fluid bolus. Dispo pending imaging but likely to be admitted as pt getting contrast and missed dialysis today.

## 2022-09-30 NOTE — ED PROVIDER NOTE - ATTENDING APP SHARED VISIT CONTRIBUTION OF CARE
Attending (Denise Head M.D.):  I have personally seen and examined this patient. I have performed a substantive portion of the visit including all aspects of the medical decision making. Resident and/or ACP note reviewed. I agree on the plan of care except where noted.    See MDM

## 2022-09-30 NOTE — CONSULT NOTE ADULT - SUBJECTIVE AND OBJECTIVE BOX
GENERAL SURGERY CONSULT NOTE  Consulting surgical team: ACS  Consulting attending: Dr. Wallace    HPI:  62F Azeri speaking PMHx myelofibrosis polycythemia on HD (AVF on LUE, MWF), hx of portal HTN w/o cirrhosis, recent admission for choledocholithiasis presenting with persistent pain. Pt was here last week with CBD dilation and elevated TBili, found to have choledocholithiasis without cholecystitis. Pt underwent ERCP with stent and sphincterotomy, stone extraction. Post op course c/b pancreatitis. During this admission pt also found to have gastric varices, portal hypertension, splenomegaly.    Pt returns due to persistent pain, n/v. Pain not associated with food however has had decreased PO intake due to nausea. Denies fevers. Has never had workup of her liver disease, but thinks she was told she had hepatitis in the past.    PAST MEDICAL HISTORY:  Polycythemia vera  Peptic ulcer disease  Hypertension  Splenomegaly  Splenic infarct  ESRD on peritoneal dialysis  Cirrhosis of liver without ascites, unspecified hepatic cirrhosis type  Pancreatic cyst  History of myelofibrosis    PAST SURGICAL HISTORY:  No significant past surgical history  Peritoneal dialysis catheter in place  Hemoperitoneum as complication of peritoneal dialysis  AV fistula    ALLERGIES:  No Known Allergies      VITALS & I/Os:  Vital Signs Last 24 Hrs  T(C): 37.4 (30 Sep 2022 15:35), Max: 38.1 (30 Sep 2022 09:40)  T(F): 99.3 (30 Sep 2022 15:35), Max: 100.5 (30 Sep 2022 09:40)  HR: 87 (30 Sep 2022 15:35) (81 - 88)  BP: 134/74 (30 Sep 2022 15:35) (116/57 - 134/74)  BP(mean): --  RR: 17 (30 Sep 2022 15:35) (17 - 20)  SpO2: 98% (30 Sep 2022 15:35) (98% - 98%)    Parameters below as of 30 Sep 2022 15:35  Patient On (Oxygen Delivery Method): room air        I&O's Summary      PHYSICAL EXAM:  GEN: resting comfortably in bed, in NAD  CHEST: no increased WOB  ABD: soft, non-distended, non-tender   EXT: warm, well perfused  NEURO: A&Ox3    LABS:                        7.0    4.97  )-----------( 114      ( 30 Sep 2022 11:38 )             22.9     09-30    130<L>  |  92<L>  |  24<H>  ----------------------------<  108<H>  4.3   |  23  |  7.13<H>    Ca    8.3<L>      30 Sep 2022 11:38    TPro  7.0  /  Alb  3.7  /  TBili  1.4<H>  /  DBili  x   /  AST  25  /  ALT  17  /  AlkPhos  84  09-30    Lactate:  09-30 @ 11:35  0.5    PT/INR - ( 30 Sep 2022 11:38 )   PT: 14.3 sec;   INR: 1.23 ratio         PTT - ( 30 Sep 2022 11:38 )  PTT:43.8 sec        IMAGING:  CT Abdomen and Pelvis w/ IV Cont (09.30.22 @ 15:22)  FINDINGS:  LOWER CHEST: Partially imaged catheter with tip in the right atrium.    LIVER: Within normal limits.  BILE DUCTS: Common bile duct stent and associated pneumobilia.  GALLBLADDER: Cholelithiasis and a focus of antidependent air.  SPLEEN: Markedly enlarged spleen.  PANCREAS: Within normal limits.  ADRENALS: Within normal limits.  KIDNEYS/URETERS: Atrophic kidneys. Subcentimeter hypodense left renal   foci too small to characterize.    BLADDER: Within normal limits.  REPRODUCTIVE ORGANS: Uterus and adnexa within normal limits.    BOWEL: No bowel obstruction. Appendix is normal.  PERITONEUM: No ascites.  VESSELS: Upper abdominal varices including gastric varices as at prior   imaging.  RETROPERITONEUM/LYMPH NODES: No lymphadenopathy.  ABDOMINAL WALL: Subcutaneous nodule in the right anterior abdominal wall,   new from recent prior imaging likely related to subcutaneous injection.  BONES: Degenerative changes with mild heterogeneity of the osseous   structures.    IMPRESSION:  No acute pathology.    CBD stent with pneumobilia.    Marked splenomegaly with upper abdominal varices.    US Abdomen Complete (US Abdomen Complete .) (09.30.22 @ 13:03)  FINDINGS:  Liver: Within normal limits.  Bile ducts: Normal caliber. Common bile duct measures 5 mm. Pneumobilia.  Gallbladder: Cholelithiasis and sludge.  No focal tenderness or evidence   of cholecystitis.  Pancreas: Visualized portions are within normal limits.  Spleen: 19.1 cm. Enlarged.  Right kidney: 7.2 cm. No hydronephrosis.  Left kidney: 8.6 cm. No hydronephrosis.  Ascites: None.  Aorta and IVC: Visualized portions are within normal limits.    IMPRESSION:  Cholelithiasis and gallbladder sludge.  No biliary ductal dilatation.  Pneumobilia.  Splenomegaly.

## 2022-09-30 NOTE — H&P ADULT - NSICDXPASTMEDICALHX_GEN_ALL_CORE_FT
PAST MEDICAL HISTORY:  History of myelofibrosis     Hypertension     Pancreatic cyst     Peptic ulcer disease     Polycythemia vera and secondary myelofibrosis    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

## 2022-09-30 NOTE — H&P ADULT - PROBLEM SELECTOR PLAN 2
- MWF via AVF left arm  - Nephrology following, for HD tonight. EPO post dialysis  - Jakafi post dialysis. Nephro-deonna. Calcium acetate  - Renal diet

## 2022-09-30 NOTE — ED ADULT NURSE NOTE - OBJECTIVE STATEMENT
62y female w/hx gallstones, pancreatitis presents to ED with abdominal pain in the RUQ& RLQ. Pt states she has been feeling pain for two weeks and has come to the ED 3x for the same complaint. Pt reports epigastric pain, nausea w/ dry heaving but denies vomiting.  Pt has a loss of appetite and states she can only tolerate small amounts of water. Pt endorses a fever, denies chest pain, denies SOB but deep breaths illicit pain. Skin is warm and dry. Patient AOx3. 62y female w/hx gallstones, pancreatitis presents to ED with abdominal pain in the RUQ& RLQ. Pt states she has been feeling pain for two weeks and has come to the ED 3x for the same complaint. Pt reports epigastric pain, nausea w/ dry heaving but denies vomiting.  Pt has a loss of appetite and states she can only tolerate small amounts of water. States she has worsening pain after eating and said she has been diagnoses w/ an ulcer in the past. Pt endorses a fever, denies chest pain, denies SOB but deep breaths illicit pain. Skin is warm and dry. Patient AOx3.

## 2022-09-30 NOTE — ED ADULT NURSE REASSESSMENT NOTE - NS ED NURSE REASSESS COMMENT FT1
Received report from Dialysis RN [facility]. Pt received 1 unit PRBCs (no reaction), 3hrs of dialysis - 1/2 liter. Pt remained stable throughout. Pending pt transport back to ED.

## 2022-09-30 NOTE — H&P ADULT - ATTENDING COMMENTS
63 yo F hx of myelofibrosis, polycythemia vera, ESRD on MFW schedule via LUE AVF (last dialysis W), portal HTN, subclavian port for transfusions, gastric varices, gallstones, recent ERCP for choledocholithiasis s/p biliary stent, c/b post ERCP pancreatitis + pneumobilia now presenting with fever and RUQ and epigastric pain.    #RUQ and epigastric abdominal pain  #ESRD  #gastric varices/portal HTN  #myelofibrosis  #PCV  #hx of ERCP pancreatitis  - patient was seen and examined at bedside  - abdominal pain started 2 weeks ago after ERCP, associated with nausea and dry heaving  - she had ERCP which was complicated by pancreatitis, had sphincterotomy and stent placement   - she felt febrile at home, and here in ED was febrile  - no diarrhea, blood in her stool, or hematemesis   - CTAP showing normal liver contour, splenomegaly, and pneumobilia(previously seen on imaging)  - ? stent occlusion, retained stone, sphincter of oddi dysfunction  - will empirically c/w Zosyn, send for blood cultures  - GI consult, Heme Consult  - pain control with Tylenol, diet as tolerated  - hemoglobin is 7.0, previously in high 8s at baseline, transfuse 1 unit PRBC today  - HD tonight, nephrology on board.

## 2022-09-30 NOTE — H&P ADULT - ASSESSMENT
61 yo F hx of myelofibrosis, polycythemia vera, ESRD on MFW schedule via LUE AVF (last dialysis W), portal HTN, subclavian port for transfusions, gastric varices, gallstones, recent ERCP for choledocolithiasis s/p biliary stent, c/b post ERCP pancreatitis + pneumobilia now presenting with fever and RUQ and epigastric pain. Differential diagnosis includes occluded stent, retained stone.

## 2022-09-30 NOTE — H&P ADULT - PROBLEM SELECTOR PLAN 1
- Last admission pt with cholelithiasis and choledocholithiasis. Had ERCP with stone removal c/b post procedure pancreatitis that delayed surgery. Cholelithiasis and a focus of antidependent air seen on today's CT.   - Etiology of abdominal pain could be new choledocholithiasis (stent patent on CT) vs stent closure vs cholelithiasis.  - GI emailed  - Patient without elevated WBC but has myelofibrosis. Febrile. Blood cultures sent. Empiric zosyn 9/30-->  - Diet as tolerated.

## 2022-09-30 NOTE — ED PROVIDER NOTE - NS ED ROS FT
CONSTITUTIONAL: + fevers, chills, fatigue, weakness No dizziness, unexpected weight change  EYES: No double vision, blurry vision  ENT: No ear pain, nasal congestion, runny nose, sore throat  CV: No chest pain, palpitations  PULM: No cough, shortness of breath  GI: + abdominal pain, nausea, vomiting. No diarrhea, constipation  : No dysuria, polyuria, hematuria  SKIN: No rashes, swelling  MSK: No muscle aches  NEURO: No headache, paresthesias  PSYCHIATRIC: Denies suicidal, homicidal ideations. No auditory, visual, tactile hallucinations

## 2022-09-30 NOTE — CONSULT NOTE ADULT - SUBJECTIVE AND OBJECTIVE BOX
[facility] Division of Kidney Diseases & Hypertension  INITIAL CONSULT NOTE  --------------------------------------------------------------------------------  HPI:    63 yo F hx of myelofibrosis, polycythemia vera, ESRD on MWf schedule via LUE AVF (last dialysis W), portal HTN, subclavian port for transfusions, gastric varices, gallstones, recent ERCP for choledocolithiasis s/p biliary stent, c/b post ERCP pancreatitis now presenting with fever and RUQ and epigastric pain.       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      Cirrhosis of liver without ascites, unspecified hepatic cirrhosis type      Pancreatic cyst      History of myelofibrosis      Peritoneal dialysis catheter in place  Placed 8/9/2017      Hemoperitoneum as complication of peritoneal dialysis  s/p removal 9/18      AV fistula  Placed 9/18        FAMILY HISTORY:  Family history of hypertension in mother    Family history of malignant neoplasm (Grandparent)  head and neck in father    FH: cirrhosis (Sibling)      PAST SOCIAL HISTORY:    ALLERGIES & MEDICATIONS  --------------------------------------------------------------------------------  Allergies    No Known Allergies    Intolerances      Standing Inpatient Medications    PRN Inpatient Medications      REVIEW OF SYSTEMS  --------------------------------------------------------------------------------  Gen: No weight changes, fatigue, fevers/chills, weakness  Skin: No rashes  Head/Eyes/Ears/Mouth: No headache; Normal hearing; Normal vision w/o blurriness; No sinus pain/discomfort, sore throat  Respiratory: No dyspnea, cough, wheezing, hemoptysis  CV: No chest pain, PND, orthopnea  GI: No abdominal pain, diarrhea, constipation, nausea, vomiting, melena, hematochezia  : No increased frequency, dysuria, hematuria, nocturia  MSK: No joint pain/swelling; no back pain; no edema  Neuro: No dizziness/lightheadedness, weakness, seizures, numbness, tingling  Heme: No easy bruising or bleeding  Endo: No heat/cold intolerance  Psych: No significant nervousness, anxiety, stress, depression    All other systems were reviewed and are negative, except as noted.    VITALS/PHYSICAL EXAM  --------------------------------------------------------------------------------  T(C): 37.4 (09-30-22 @ 15:35), Max: 38.1 (09-30-22 @ 09:40)  HR: 87 (09-30-22 @ 15:35) (81 - 88)  BP: 134/74 (09-30-22 @ 15:35) (116/57 - 134/74)  RR: 17 (09-30-22 @ 15:35) (17 - 20)  SpO2: 98% (09-30-22 @ 15:35) (98% - 98%)  Wt(kg): --  Height (cm): 157.5 (09-30-22 @ 09:40)  Weight (kg): 59 (09-30-22 @ 09:40)  BMI (kg/m2): 23.8 (09-30-22 @ 09:40)  BSA (m2): 1.59 (09-30-22 @ 09:40)      Physical Exam:  	Gen: NAD,uncomfortable   	HEENT:  supple neck, clear oropharynx  	Pulm: CTA B/L  	CV: RRR, S1S2; no rub  	Back: No spinal or CVA tenderness; no sacral edema  	Abd: +BS, soft, epigastric tenderness   	: No suprapubic tenderness  	UE: Warm, FROM, no clubbing, intact strength; no edema; no asterixis  	LE: Warm, FROM, no clubbing, intact strength; no edema  	Neuro: No focal deficits, intact gait  	Psych: Normal affect and mood  	Skin: Warm, without rashes  	Vascular access: fistula    LABS/STUDIES  --------------------------------------------------------------------------------              7.0    4.97  >-----------<  114      [09-30-22 @ 11:38]              22.9     130  |  92  |  24  ----------------------------<  108      [09-30-22 @ 11:38]  4.3   |  23  |  7.13        Ca     8.3     [09-30-22 @ 11:38]    TPro  7.0  /  Alb  3.7  /  TBili  1.4  /  DBili  x   /  AST  25  /  ALT  17  /  AlkPhos  84  [09-30-22 @ 11:38]    PT/INR: PT 14.3 , INR 1.23       [09-30-22 @ 11:38]  PTT: 43.8       [09-30-22 @ 11:38]      Creatinine Trend:  SCr 7.13 [09-30 @ 11:38]  SCr 5.91 [09-26 @ 22:12]  SCr 6.77 [09-25 @ 08:13]  SCr 4.91 [09-24 @ 06:46]  SCr 6.78 [09-23 @ 07:08]

## 2022-09-30 NOTE — H&P ADULT - NSHPLABSRESULTS_GEN_ALL_CORE
LABS:                         7.0    4.97  )-----------( 114      ( 30 Sep 2022 11:38 )             22.9     09-30    130<L>  |  92<L>  |  24<H>  ----------------------------<  108<H>  4.3   |  23  |  7.13<H>    Ca    8.3<L>      30 Sep 2022 11:38    TPro  7.0  /  Alb  3.7  /  TBili  1.4<H>  /  DBili  x   /  AST  25  /  ALT  17  /  AlkPhos  84  09-30    PT/INR - ( 30 Sep 2022 11:38 )   PT: 14.3 sec;   INR: 1.23 ratio         PTT - ( 30 Sep 2022 11:38 )  PTT:43.8 sec          RADIOLOGY, EKG & ADDITIONAL TESTS:  < from: CT Abdomen and Pelvis w/ IV Cont (09.30.22 @ 15:22) >    FINDINGS:  LOWER CHEST: Partially imaged catheter with tip in the right atrium.    LIVER: Within normal limits.  BILE DUCTS: Common bile duct stent and associated pneumobilia.  GALLBLADDER: Cholelithiasis and a focus of antidependent air.  SPLEEN: Markedly enlarged spleen.  PANCREAS: Within normal limits.  ADRENALS: Within normal limits.  KIDNEYS/URETERS: Atrophic kidneys. Subcentimeter hypodense left renal   foci too small to characterize.    BLADDER: Within normal limits.  REPRODUCTIVE ORGANS: Uterus and adnexa within normal limits.    BOWEL: No bowel obstruction. Appendix is normal.  PERITONEUM: No ascites.  VESSELS: Upper abdominal varices including gastric varices as at prior   imaging.  RETROPERITONEUM/LYMPH NODES: No lymphadenopathy.  ABDOMINAL WALL: Subcutaneous nodule in the right anterior abdominal wall,   new from recent prior imaging likely related to subcutaneous injection.  BONES: Degenerative changes with mild heterogeneity of the osseous   structures.    < end of copied text >    < from: US Abdomen Complete (US Abdomen Complete .) (09.30.22 @ 13:03) >    FINDINGS:  Liver: Within normal limits.  Bile ducts: Normal caliber. Common bile duct measures 5 mm. Pneumobilia.  Gallbladder: Cholelithiasis and sludge.  No focal tenderness or evidence   of cholecystitis.  Pancreas: Visualized portions are within normal limits.  Spleen: 19.1 cm. Enlarged.  Right kidney: 7.2 cm. No hydronephrosis.  Left kidney: 8.6 cm. No hydronephrosis.  Ascites: None.  Aorta and IVC: Visualized portions are within normal limits.    < end of copied text >

## 2022-09-30 NOTE — ED PROVIDER NOTE - PHYSICAL EXAMINATION
GENERAL: Vital signs notable for fever, no tachycardia, BP wnl, noraml resp rate, normal O2 saturation  EYES: Conjunctiva noninjected or pale, sclera anicteric  HENT: NC/AT, moist mucous membranes  NECK: Supple, trachea midline  LUNG: Nonlabored respirations, no wheezes, rales  CV: RRR, Pulses- Radial/dorsalis pedis: 2+ bilateral and equal  ABDOMEN: Nondistended, + epigastric and RUQ ttp, no rebound or guarding  MSK: No visible deformities, nontender extremities  SKIN: No rashes, bruises  NEURO: AAOx4 (to person, place, time, event), no tremor, steady gait  PSYCH: Normal mood and affect

## 2022-09-30 NOTE — H&P ADULT - NSHPREVIEWOFSYSTEMS_GEN_ALL_CORE
REVIEW OF SYSTEMS:  CONSTITUTIONAL: + fevers or chills  EYES/ENT: No visual changes   NECK: No pain or stiffness  RESPIRATORY: No cough, wheezing, hemoptysis; No shortness of breath  CARDIOVASCULAR: No chest pain or palpitations  GASTROINTESTINAL: + abdominal pain that occurs 2 hours after eating food. 10/10 + RUQ pain  GENITOURINARY: No dysuria, frequency or hematuria  NEUROLOGICAL: No numbness or weakness  SKIN: No itching, rashes

## 2022-10-01 LAB
ALBUMIN SERPL ELPH-MCNC: 3.6 G/DL — SIGNIFICANT CHANGE UP (ref 3.3–5)
ALP SERPL-CCNC: 83 U/L — SIGNIFICANT CHANGE UP (ref 40–120)
ALT FLD-CCNC: 18 U/L — SIGNIFICANT CHANGE UP (ref 10–45)
ANION GAP SERPL CALC-SCNC: 13 MMOL/L — SIGNIFICANT CHANGE UP (ref 5–17)
AST SERPL-CCNC: 26 U/L — SIGNIFICANT CHANGE UP (ref 10–40)
BILIRUB SERPL-MCNC: 1.5 MG/DL — HIGH (ref 0.2–1.2)
BUN SERPL-MCNC: 11 MG/DL — SIGNIFICANT CHANGE UP (ref 7–23)
CALCIUM SERPL-MCNC: 8.3 MG/DL — LOW (ref 8.4–10.5)
CHLORIDE SERPL-SCNC: 96 MMOL/L — SIGNIFICANT CHANGE UP (ref 96–108)
CO2 SERPL-SCNC: 26 MMOL/L — SIGNIFICANT CHANGE UP (ref 22–31)
CREAT SERPL-MCNC: 3.89 MG/DL — HIGH (ref 0.5–1.3)
EGFR: 13 ML/MIN/1.73M2 — LOW
GLUCOSE SERPL-MCNC: 107 MG/DL — HIGH (ref 70–99)
HCT VFR BLD CALC: 25.2 % — LOW (ref 34.5–45)
HCT VFR BLD CALC: 28.9 % — LOW (ref 34.5–45)
HGB BLD-MCNC: 7.8 G/DL — LOW (ref 11.5–15.5)
HGB BLD-MCNC: 9.4 G/DL — LOW (ref 11.5–15.5)
INR BLD: 1.16 RATIO — SIGNIFICANT CHANGE UP (ref 0.88–1.16)
MCHC RBC-ENTMCNC: 29.5 PG — SIGNIFICANT CHANGE UP (ref 27–34)
MCHC RBC-ENTMCNC: 30.1 PG — SIGNIFICANT CHANGE UP (ref 27–34)
MCHC RBC-ENTMCNC: 31 GM/DL — LOW (ref 32–36)
MCHC RBC-ENTMCNC: 32.5 GM/DL — SIGNIFICANT CHANGE UP (ref 32–36)
MCV RBC AUTO: 92.6 FL — SIGNIFICANT CHANGE UP (ref 80–100)
MCV RBC AUTO: 95.5 FL — SIGNIFICANT CHANGE UP (ref 80–100)
NRBC # BLD: 2 /100 WBCS — HIGH (ref 0–0)
NRBC # BLD: 2 /100 WBCS — HIGH (ref 0–0)
PLATELET # BLD AUTO: 117 K/UL — LOW (ref 150–400)
PLATELET # BLD AUTO: 122 K/UL — LOW (ref 150–400)
POTASSIUM SERPL-MCNC: 3.8 MMOL/L — SIGNIFICANT CHANGE UP (ref 3.5–5.3)
POTASSIUM SERPL-SCNC: 3.8 MMOL/L — SIGNIFICANT CHANGE UP (ref 3.5–5.3)
PROT SERPL-MCNC: 7 G/DL — SIGNIFICANT CHANGE UP (ref 6–8.3)
PROTHROM AB SERPL-ACNC: 13.5 SEC — HIGH (ref 10.5–13.4)
RBC # BLD: 2.64 M/UL — LOW (ref 3.8–5.2)
RBC # BLD: 3.12 M/UL — LOW (ref 3.8–5.2)
RBC # FLD: 18.8 % — HIGH (ref 10.3–14.5)
RBC # FLD: 19 % — HIGH (ref 10.3–14.5)
SODIUM SERPL-SCNC: 135 MMOL/L — SIGNIFICANT CHANGE UP (ref 135–145)
WBC # BLD: 4.08 K/UL — SIGNIFICANT CHANGE UP (ref 3.8–10.5)
WBC # BLD: 5.73 K/UL — SIGNIFICANT CHANGE UP (ref 3.8–10.5)
WBC # FLD AUTO: 4.08 K/UL — SIGNIFICANT CHANGE UP (ref 3.8–10.5)
WBC # FLD AUTO: 5.73 K/UL — SIGNIFICANT CHANGE UP (ref 3.8–10.5)

## 2022-10-01 PROCEDURE — 99233 SBSQ HOSP IP/OBS HIGH 50: CPT | Mod: GC

## 2022-10-01 PROCEDURE — 71045 X-RAY EXAM CHEST 1 VIEW: CPT | Mod: 26

## 2022-10-01 PROCEDURE — 99254 IP/OBS CNSLTJ NEW/EST MOD 60: CPT | Mod: GC

## 2022-10-01 RX ORDER — POLYETHYLENE GLYCOL 3350 17 G/17G
17 POWDER, FOR SOLUTION ORAL ONCE
Refills: 0 | Status: COMPLETED | OUTPATIENT
Start: 2022-10-01 | End: 2022-10-01

## 2022-10-01 RX ORDER — RUXOLITINIB 25 MG/1
10 TABLET ORAL
Refills: 0 | Status: DISCONTINUED | OUTPATIENT
Start: 2022-10-01 | End: 2022-10-12

## 2022-10-01 RX ORDER — SENNA PLUS 8.6 MG/1
1 TABLET ORAL ONCE
Refills: 0 | Status: COMPLETED | OUTPATIENT
Start: 2022-10-01 | End: 2022-10-01

## 2022-10-01 RX ORDER — ACETAMINOPHEN 500 MG
1000 TABLET ORAL ONCE
Refills: 0 | Status: COMPLETED | OUTPATIENT
Start: 2022-10-01 | End: 2022-10-01

## 2022-10-01 RX ORDER — OXYCODONE HYDROCHLORIDE 5 MG/1
5 TABLET ORAL ONCE
Refills: 0 | Status: DISCONTINUED | OUTPATIENT
Start: 2022-10-01 | End: 2022-10-01

## 2022-10-01 RX ORDER — DIPHENHYDRAMINE HCL 50 MG
25 CAPSULE ORAL ONCE
Refills: 0 | Status: COMPLETED | OUTPATIENT
Start: 2022-10-01 | End: 2022-10-01

## 2022-10-01 RX ADMIN — Medication 400 MILLIGRAM(S): at 00:37

## 2022-10-01 RX ADMIN — Medication 1 TABLET(S): at 12:16

## 2022-10-01 RX ADMIN — Medication 1000 MILLIGRAM(S): at 01:07

## 2022-10-01 RX ADMIN — OXYCODONE HYDROCHLORIDE 5 MILLIGRAM(S): 5 TABLET ORAL at 23:33

## 2022-10-01 RX ADMIN — PIPERACILLIN AND TAZOBACTAM 25 GRAM(S): 4; .5 INJECTION, POWDER, LYOPHILIZED, FOR SOLUTION INTRAVENOUS at 00:07

## 2022-10-01 RX ADMIN — Medication 1000 MILLIGRAM(S): at 22:47

## 2022-10-01 RX ADMIN — DANAZOL 200 MILLIGRAM(S): 200 CAPSULE ORAL at 21:15

## 2022-10-01 RX ADMIN — Medication 30 MILLILITER(S): at 13:07

## 2022-10-01 RX ADMIN — Medication 400 MILLIGRAM(S): at 20:59

## 2022-10-01 RX ADMIN — PIPERACILLIN AND TAZOBACTAM 25 GRAM(S): 4; .5 INJECTION, POWDER, LYOPHILIZED, FOR SOLUTION INTRAVENOUS at 23:37

## 2022-10-01 RX ADMIN — Medication 25 MILLIGRAM(S): at 09:28

## 2022-10-01 RX ADMIN — DANAZOL 200 MILLIGRAM(S): 200 CAPSULE ORAL at 05:14

## 2022-10-01 RX ADMIN — PIPERACILLIN AND TAZOBACTAM 25 GRAM(S): 4; .5 INJECTION, POWDER, LYOPHILIZED, FOR SOLUTION INTRAVENOUS at 12:16

## 2022-10-01 RX ADMIN — DANAZOL 200 MILLIGRAM(S): 200 CAPSULE ORAL at 14:25

## 2022-10-01 RX ADMIN — Medication 1334 MILLIGRAM(S): at 17:25

## 2022-10-01 NOTE — CONSULT NOTE ADULT - ASSESSMENT
61 yo F hx of myelofibrosis, polycythemia vera, ESRD on MFW schedule via LUE AVF (last dialysis W), portal HTN, subclavian port for transfusions, gastric varices, gallstones, recent ERCP for choledocolithiasis s/p biliary stent, c/b post ERCP pancreatitis + pneumobilia now presenting with fever and RUQ and epigastric pain. Hematology consulted for PV and myelofibrosis     #PV and myelofibrosis  - hx MAK 2+ PV and secondary myelofibrosis on jakafi 10mg TIW w/ dialysis and danazol 200mg TID, receives bimonthly transfusions at [facility] s/p 2 units of blood this hospital admission for Hg 7 (baseline 8-9)  - follows w/ Dr. Jacky Guo  - continue above medications  - f/u GI recs  - on discharge please include f/u at [facility] w/ Dr. Guo 63 yo F hx of myelofibrosis, polycythemia vera, ESRD on MFW schedule via LUE AVF (last dialysis W), portal HTN, subclavian port for transfusions, gastric varices, gallstones, recent ERCP for choledocolithiasis s/p biliary stent, c/b post ERCP pancreatitis + pneumobilia now presenting with fever and RUQ and epigastric pain. Hematology consulted for PV and myelofibrosis     Hematology history  History of MAK 2 + polycythemia vera, initially diagnosed in 2012. 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 Hollie Guzmán in [place] (# [phone]). She has had a bone marrow biopsy done at the time of diagnosis. Was previously non complaint with therapy there, however has been quite complaint with the hydroxyurea since coming to [facility] fellows clinic here in 2017. Patient now follows with Dr. Jacky Guo after closure of the fellows clinic.   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  She was admitted to [place] from July 3-5, 2018 due to hyperkalemia and thrombosis of her AVF. Hematology was consulted and recommended phlebotomy, but patient refused. She was discharged on Hydrea 500mg on HD days, which she is still on. As of 9/27/19 she has transferred from the hematology fellows clinic to Dr. Guo's service at [facility]. She was previously treated with HU in the setting of abdominal pain 2/2 splenomegaly w/ splenic infarcts; Hgb came up with treatment and pain improved. After a month of Jakafi 15 mg following HD three times a week, patient abdominal pain and LUQ pain resolved, but she became weak and anemic, requiring transfusion at the hospital. She is currently on Jakafi 10 mg on days she gets HD three times a week, and was started on danazol 600mg daily    #PV and myelofibrosis  - hx MAK 2+ PV and secondary myelofibrosis on jakafi 10mg TIW w/ dialysis and danazol 200mg TID, receives bimonthly transfusions at [facility] s/p 2 units of blood this hospital admission for Hg 7 (baseline 8-9)  - follows w/ Dr. Jacky Guo  - continue above medications  - f/u GI recs  - on discharge please include f/u at [facility] w/ Dr. Guo 61 yo F hx of myelofibrosis, polycythemia vera, ESRD on MFW schedule via LUE AVF (last dialysis W), portal HTN, subclavian port for transfusions, gastric varices, gallstones, recent ERCP for choledocolithiasis s/p biliary stent, c/b post ERCP pancreatitis + pneumobilia now presenting with fever and RUQ and epigastric pain. Hematology consulted for PV and myelofibrosis     Hematology history  History of MAK 2 + polycythemia vera, initially diagnosed in 2012. 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 Hollie Guzmán in [place] (# [phone]). She has had a bone marrow biopsy done at the time of diagnosis. Was previously non complaint with therapy there, however has been quite complaint with the hydroxyurea since coming to [facility] fellows clinic here in 2017. Patient now follows with Dr. Jacky Guo after closure of the fellows clinic.   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  She was admitted to [place] from July 3-5, 2018 due to hyperkalemia and thrombosis of her AVF. Hematology was consulted and recommended phlebotomy, but patient refused. She was discharged on Hydrea 500mg on HD days, which she is still on. As of 9/27/19 she has transferred from the hematology fellows clinic to Dr. Guo's service at [facility]. She was previously treated with HU in the setting of abdominal pain 2/2 splenomegaly w/ splenic infarcts; Hgb came up with treatment and pain improved. After a month of Jakafi 15 mg following HD three times a week, patient abdominal pain and LUQ pain resolved, but she became weak and anemic, requiring transfusion at the hospital. She is currently on Jakafi 10 mg on days she gets HD three times a week, and was started on danazol 600mg daily    #PV and myelofibrosis  - hx MAK 2+ PV and secondary myelofibrosis on jakafi 10mg TIW w/ dialysis and danazol 200mg TID, receives bimonthly transfusions at [facility] s/p 2 units of blood this hospital admission for Hg 7 (baseline 8-9)  - follows w/ Dr. Jacky Guo  - continue above medications  - f/u GI recs  - abdominal + chest discomfort/subjective SOB- would consider trial of maalox for pain and CXR as patient w discomfort/sob arising after blood transfusion  - on discharge please include f/u at [facility] w/ Dr. Guo 61 yo F hx of myelofibrosis, polycythemia vera, ESRD on MFW schedule via LUE AVF (last dialysis W), portal HTN, subclavian port for transfusions, gastric varices, gallstones, recent ERCP for choledocolithiasis s/p biliary stent, c/b post ERCP pancreatitis + pneumobilia now presenting with fever and RUQ and epigastric pain. Hematology consulted for PV and myelofibrosis     Hematology history  History of MAK 2 + polycythemia vera, initially diagnosed in 2012. 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 Hollie Guzmán in [place] (# [phone]). She has had a bone marrow biopsy done at the time of diagnosis. Was previously non complaint with therapy there, however has been quite complaint with the hydroxyurea since coming to [facility] fellows clinic here in 2017. Patient now follows with Dr. Jacky Guo after closure of the fellows clinic.   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  She was admitted to [place] from July 3-5, 2018 due to hyperkalemia and thrombosis of her AVF. Hematology was consulted and recommended phlebotomy, but patient refused. She was discharged on Hydrea 500mg on HD days, which she is still on. As of 9/27/19 she has transferred from the hematology fellows clinic to Dr. Guo's service at [facility]. She was previously treated with HU in the setting of abdominal pain 2/2 splenomegaly w/ splenic infarcts; Hgb came up with treatment and pain improved. After a month of Jakafi 15 mg following HD three times a week, patient abdominal pain and LUQ pain resolved, but she became weak and anemic, requiring transfusion at the hospital. She is currently on Jakafi 10 mg on days she gets HD three times a week, and was started on danazol 600mg daily    #PV and myelofibrosis  - hx MAK 2+ PV and secondary myelofibrosis on jakafi 10mg TIW w/ dialysis and danazol 200mg TID, receives bimonthly transfusions at [facility] s/p 2 units of blood this hospital admission for Hg 7 (baseline 8-9)  - follows w/ Dr. Jacky Guo  - continue above medications  - f/u GI recs  - abdominal + chest discomfort/subjective SOB- would consider trial of maalox for pain and CXR as patient w discomfort/sob arising after blood transfusion  - peripheral blood smear 10/1: tear drop cells, metamyelocytes, myelocytes, 1 rare blast cell noted which is in line w/ myelofibrosis diagnosis  - on discharge please include f/u at [facility] w/ Dr. Guo 61 yo F hx of myelofibrosis, polycythemia vera, ESRD on MFW schedule via LUE AVF (last dialysis W), portal HTN, subclavian port for transfusions, gastric varices, gallstones, recent ERCP for choledocolithiasis s/p biliary stent, c/b post ERCP pancreatitis + pneumobilia now presenting with fever and RUQ and epigastric pain. Hematology consulted for PV and myelofibrosis     Hematology history  History of MAK 2 + polycythemia vera, initially diagnosed in 2012. 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 Hollie Guzmán in [place] (# [phone]). She has had a bone marrow biopsy done at the time of diagnosis. Was previously non complaint with therapy there, however has been quite complaint with the hydroxyurea since coming to [facility] fellows clinic here in 2017. Patient now follows with Dr. Jacky Guo after closure of the fellows clinic.   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  She was admitted to [place] from July 3-5, 2018 due to hyperkalemia and thrombosis of her AVF. Hematology was consulted and recommended phlebotomy, but patient refused. She was discharged on Hydrea 500mg on HD days, which she is still on. As of 9/27/19 she has transferred from the hematology fellows clinic to Dr. Guo's service at [facility]. She was previously treated with HU in the setting of abdominal pain 2/2 splenomegaly w/ splenic infarcts; Hgb came up with treatment and pain improved. After a month of Jakafi 15 mg following HD three times a week, patient abdominal pain and LUQ pain resolved, but she became weak and anemic, requiring transfusion at the hospital. She is currently on Jakafi 10 mg on days she gets HD three times a week, and was started on danazol 600mg daily    #PV and myelofibrosis  - hx MAK 2+ PV and secondary myelofibrosis on jakafi 10mg TIW w/ dialysis and danazol 200mg TID, receives bimonthly transfusions at [facility] s/p 2 units of blood this hospital admission for Hg 7 (baseline 8-9)  - follows w/ Dr. Jacky Guo  - continue above medications  - f/u GI recs  - abdominal + chest discomfort/subjective SOB- would consider trial of maalox for pain and CXR as patient w discomfort/sob arising after blood transfusion  - peripheral blood smear 10/1: tear drop cells, metamyelocytes, myelocytes, 1 rare blast cell noted which is in line w/ myelofibrosis diagnosis, RBC and platelet morphology unable to be appreciated likely 2/2 to poor slide preparation  - on discharge please include f/u at [facility] w/ Dr. Guo

## 2022-10-01 NOTE — PROGRESS NOTE ADULT - SUBJECTIVE AND OBJECTIVE BOX
PROGRESS NOTE:   Authored by: Atul Conroy M.D.   Internal Medicine PGY-1  Please Contact Via Teams    Patient is a 62y old  Female who presents with a chief complaint of Abdominal pain (01 Oct 2022 06:16)      SUBJECTIVE / OVERNIGHT EVENTS:  No acute events overnight. Pt this morning is still complaining of early satiety and abdominal pain 2 hours after eating. He abdominal pain is slightly improved compared to when she first came in. She also mentions that that SOB started 2 hours after 2nd unit PRBC. VSS stable, lungs clear. Given benadryl.    ADDITIONAL REVIEW OF SYSTEMS:  Patient denies fevers, chills, chest pain, shortness of breath, nausea, abdominal pain, diarrhea, constipation, dysuria, leg swelling, headache, light headedness.    MEDICATIONS  (STANDING):  calcium acetate 1334 milliGRAM(s) Oral three times a day with meals  danazol 200 milliGRAM(s) Oral three times a day  epoetin filiberto-epbx (RETACRIT) Injectable 70809 Unit(s) IV Push <User Schedule>  multivitamin 1 Tablet(s) Oral daily  piperacillin/tazobactam IVPB.. 3.375 Gram(s) IV Intermittent every 12 hours  polyethylene glycol 3350 17 Gram(s) Oral once  propranolol 10 milliGRAM(s) Oral daily  ruxolitinib 15 milliGRAM(s) Oral <User Schedule>  senna 1 Tablet(s) Oral once    MEDICATIONS  (PRN):      CAPILLARY BLOOD GLUCOSE        I&O's Summary    30 Sep 2022 07:01  -  01 Oct 2022 07:00  --------------------------------------------------------  IN: 0 mL / OUT: 500 mL / NET: -500 mL        PHYSICAL EXAM:  Vital Signs Last 24 Hrs  T(C): 36.6 (01 Oct 2022 08:55), Max: 38.2 (30 Sep 2022 19:36)  T(F): 97.8 (01 Oct 2022 08:55), Max: 100.7 (30 Sep 2022 19:36)  HR: 61 (01 Oct 2022 08:55) (61 - 88)  BP: 118/78 (01 Oct 2022 08:55) (107/68 - 144/65)  BP(mean): --  RR: 18 (01 Oct 2022 08:55) (16 - 20)  SpO2: 100% (01 Oct 2022 08:55) (95% - 100%)    Parameters below as of 01 Oct 2022 08:55  Patient On (Oxygen Delivery Method): room air    GENERAL: NAD  HEAD:  Atraumatic, Normocephalic  EYES: EOMI, PERRLA, conjunctiva and sclera clear  ENT: Moist mucous membranes  NECK: Supple, No JVD  CHEST/LUNG: Clear to auscultation bilaterally; Unlabored respirations  HEART: Regular rate and rhythm; No murmurs. Chest tender to palpation  Peripheral edema: none  ABDOMEN: RUQ and epigastric tenderness with hightower sign. Also has right flank tenderness which she says she has had since she was in her 20's.  EXTREMITIES:  2+ radial pulses, 2+ dorsalis pedis. AVF patent.  NERVOUS SYSTEM:  A&Ox3, no gross lateralizing deficits   SKIN: No rashes or lesions      LABS:                        9.4    5.73  )-----------( 122      ( 01 Oct 2022 08:50 )             28.9     10-01    135  |  96  |  11  ----------------------------<  107<H>  3.8   |  26  |  3.89<H>    Ca    8.3<L>      01 Oct 2022 08:50    TPro  7.0  /  Alb  3.6  /  TBili  1.5<H>  /  DBili  x   /  AST  26  /  ALT  18  /  AlkPhos  83  10-01    PT/INR - ( 01 Oct 2022 08:50 )   PT: 13.5 sec;   INR: 1.16 ratio         PTT - ( 30 Sep 2022 11:38 )  PTT:43.8 sec            No new imaging or micro

## 2022-10-01 NOTE — PROGRESS NOTE ADULT - PROBLEM SELECTOR PLAN 3
- Bi monthly transfusions at [facility]. Followed by Dr. Benjy Guo  - Hgb 7.0 baseline high 8's/low 9's based on outpatient labs  - Continue Danazol  - Heme consult  - s/p 2unit PRBC

## 2022-10-01 NOTE — PROGRESS NOTE ADULT - ATTENDING COMMENTS
Patient seen and examined. Plan as discussed w/ Dr. Conroy: continue to monitor postprandial RUQ pain in setting of ?choledolithiasis; for now will continue empiric Abx pending GI's evaluation of imaging obtained as patient presented w/ fevers; lactate WNL, but continue serial abdominal examinations; continue home medications; appreciate nephrology's evaluation (last HD on Friday, patient received a second PRBC unit after HD yesterday -- monitor fluid status closely) and heme-onc's recommendations.

## 2022-10-01 NOTE — CONSULT NOTE ADULT - SUBJECTIVE AND OBJECTIVE BOX
Hematology Consult Note  ***recs not final until attending attestation***    Mumtaz Resendiz MD PGY-4  Reachable on TEAMS    HPI:  61 yo F hx of myelofibrosis, polycythemia vera, ESRD on MFW schedule via LUE AVF (last dialysis W), portal HTN, subclavian port for transfusions, gastric varices, gallstones, recent ERCP for choledocolithiasis s/p biliary stent, c/b post ERCP pancreatitis + pneumobilia now presenting with fever and RUQ and epigastric pain. States pain has been ongoing for past 2 weeks, and has been in ER 3 times over this time. [place] chills last night, noted to be febrile this morning. Nausea and few episodes of nbnb emesis this AM.    ED Course:  Febrile to 100.5. Normal HR and BP. Pt was given tylenol, zosyn, and blood cultures obtained. U/S abdomen showed no acute pathology, CBD stent with pneumobilia, marked splenomegaly with upper abdominal varices. CT CA/P with contrast showed the same. No imaging evidence of cirrhosis. General surgery with no intervention. Nephrology also consulted will dialyze as per schedule.    Admitted to medicine for further management. (30 Sep 2022 18:21)      Allergies    No Known Allergies    Intolerances        MEDICATIONS  (STANDING):  calcium acetate 1334 milliGRAM(s) Oral three times a day with meals  danazol 200 milliGRAM(s) Oral three times a day  epoetin filiberto-epbx (RETACRIT) Injectable 80393 Unit(s) IV Push <User Schedule>  multivitamin 1 Tablet(s) Oral daily  piperacillin/tazobactam IVPB.. 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      Pancreatic cyst      History of myelofibrosis      History of myelofibrosis      Peritoneal dialysis catheter in place  Placed 8/9/2017      Hemoperitoneum as complication of peritoneal dialysis  s/p removal 9/18      AV fistula  Placed 9/18          FAMILY HISTORY:  Family history of hypertension in mother    Family history of malignant neoplasm (Grandparent)  head and neck in father    FH: cirrhosis (Sibling)        SOCIAL HISTORY: No EtOH, no tobacco    REVIEW OF SYSTEMS:    CONSTITUTIONAL: No weakness, fevers or chills  EYES/ENT: No visual changes;  No vertigo or throat pain   NECK: No pain or stiffness  RESPIRATORY: No cough, wheezing, hemoptysis; No shortness of breath  CARDIOVASCULAR: No chest pain or palpitations  GASTROINTESTINAL: No abdominal or epigastric pain. No nausea, vomiting, or hematemesis; No diarrhea or constipation. No melena or hematochezia.  GENITOURINARY: No dysuria, frequency or hematuria  NEUROLOGICAL: No numbness or weakness  SKIN: No itching, burning, rashes, or lesions   All other review of systems is negative unless indicated above.        T(F): 97.8 (10-01-22 @ 08:55), Max: 100.7 (09-30-22 @ 19:36)  HR: 61 (10-01-22 @ 08:55)  BP: 118/78 (10-01-22 @ 08:55)  RR: 18 (10-01-22 @ 08:55)  SpO2: 100% (10-01-22 @ 08:55)  Wt(kg): --    Constitutional: NAD  Eyes: EOMI, sclera non-icteric  Neck: supple  Respiratory: no inc wob  Cardiovascular: RRR,   Gastrointestinal: soft, NTND, no masses palpable,  Extremities: no cyanosis, no clubbing                          9.4    5.73  )-----------( 122      ( 01 Oct 2022 08:50 )             28.9       10-01    135  |  96  |  11  ----------------------------<  107<H>  3.8   |  26  |  3.89<H>    Ca    8.3<L>      01 Oct 2022 08:50    TPro  7.0  /  Alb  3.6  /  TBili  1.5<H>  /  DBili  x   /  AST  26  /  ALT  18  /  AlkPhos  83  10-01          RADIOLOGY & ADDITIONAL TESTS:  Studies reviewed.     Hematology Consult Note  ***recs not final until attending attestation***    Mumtaz Resendiz MD PGY-4  Reachable on TEAMS    HPI:  61 yo F hx of myelofibrosis, polycythemia vera, ESRD on [facility]FW schedule via LUE AVF (last dialysis W), portal HTN, subclavian port for transfusions, gastric varices, gallstones, recent ERCP for choledocolithiasis s/p biliary stent, c/b post ERCP pancreatitis + pneumobilia now presenting with fever and RUQ and epigastric pain. States pain has been ongoing for past 2 weeks, and has been in ER 3 times over this time. [place] chills last night, noted to be febrile this morning. Nausea and few episodes of nbnb emesis this AM.    ED Course:  Febrile to 100.5. Normal HR and BP. Pt was given tylenol, zosyn, and blood cultures obtained. U/S abdomen showed no acute pathology, CBD stent with pneumobilia, marked splenomegaly with upper abdominal varices. CT CA/P with contrast showed the same. No imaging evidence of cirrhosis. General surgery with no intervention. Nephrology also consulted will dialyze as per schedule.    Admitted to medicine for further management. (30 Sep 2022 18:21)      Allergies    No Known Allergies    Intolerances        MEDICATIONS  (STANDING):  calcium acetate 1334 milliGRAM(s) Oral three times a day with meals  danazol 200 milliGRAM(s) Oral three times a day  epoetin filiberto-epbx (RETACRIT) Injectable 48592 Unit(s) IV Push <User Schedule>  multivitamin 1 Tablet(s) Oral daily  piperacillin/tazobactam IVPB.. 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      Pancreatic cyst      History of myelofibrosis      History of myelofibrosis      Peritoneal dialysis catheter in place  Placed 8/9/2017      Hemoperitoneum as complication of peritoneal dialysis  s/p removal 9/18      AV fistula  Placed 9/18          FAMILY HISTORY:  Family history of hypertension in mother    Family history of malignant neoplasm (Grandparent)  head and neck in father    FH: cirrhosis (Sibling)        SOCIAL HISTORY: No EtOH, no tobacco    REVIEW OF SYSTEMS:    CONSTITUTIONAL: No weakness, fevers or chills  EYES/ENT: No visual changes;  No vertigo or throat pain   NECK: No pain or stiffness  RESPIRATORY: No cough, wheezing, hemoptysis; No shortness of breath  CARDIOVASCULAR: No chest pain or palpitations  GASTROINTESTINAL: No abdominal or epigastric pain. No nausea, vomiting, or hematemesis; No diarrhea or constipation. No melena or hematochezia.  GENITOURINARY: No dysuria, frequency or hematuria  NEUROLOGICAL: No numbness or weakness  SKIN: No itching, burning, rashes, or lesions   All other review of systems is negative unless indicated above.        T(F): 97.8 (10-01-22 @ 08:55), Max: 100.7 (09-30-22 @ 19:36)  HR: 61 (10-01-22 @ 08:55)  BP: 118/78 (10-01-22 @ 08:55)  RR: 18 (10-01-22 @ 08:55)  SpO2: 100% (10-01-22 @ 08:55)  Wt(kg): --    Constitutional: NAD  Eyes: EOMI, sclera non-icteric  Neck: supple  Respiratory: no inc wob  Cardiovascular: RRR, pain on palpation chest  Gastrointestinal: soft, tenderness to palpation in RUQ  Extremities: no cyanosis, no clubbing                          9.4    5.73  )-----------( 122      ( 01 Oct 2022 08:50 )             28.9       10-01    135  |  96  |  11  ----------------------------<  107<H>  3.8   |  26  |  3.89<H>    Ca    8.3<L>      01 Oct 2022 08:50    TPro  7.0  /  Alb  3.6  /  TBili  1.5<H>  /  DBili  x   /  AST  26  /  ALT  18  /  AlkPhos  83  10-01          RADIOLOGY & ADDITIONAL TESTS:  Studies reviewed.     Hematology Consult Note  ***recs not final until attending attestation***    Mumtaz Resendiz MD PGY-4  Reachable on TEAMS    HPI:  61 yo F hx of myelofibrosis, polycythemia vera, ESRD on [facility]FW schedule via LUE AVF (last dialysis W), portal HTN, subclavian port for transfusions, gastric varices, gallstones, recent ERCP for choledocolithiasis s/p biliary stent, c/b post ERCP pancreatitis + pneumobilia now presenting with fever and RUQ and epigastric pain. States pain has been ongoing for past 2 weeks, and has been in ER 3 times over this time. [place] chills last night, noted to be febrile this morning. Nausea and few episodes of nbnb emesis this AM.    ED Course:  Febrile to 100.5. Normal HR and BP. Pt was given tylenol, zosyn, and blood cultures obtained. U/S abdomen showed no acute pathology, CBD stent with pneumobilia, marked splenomegaly with upper abdominal varices. CT CA/P with contrast showed the same. No imaging evidence of cirrhosis. General surgery with no intervention. Nephrology also consulted will dialyze as per schedule.    Admitted to medicine for further management. (30 Sep 2022 18:21)      Allergies    No Known Allergies    Intolerances        MEDICATIONS  (STANDING):  calcium acetate 1334 milliGRAM(s) Oral three times a day with meals  danazol 200 milliGRAM(s) Oral three times a day  epoetin filiberto-epbx (RETACRIT) Injectable 36474 Unit(s) IV Push <User Schedule>  multivitamin 1 Tablet(s) Oral daily  piperacillin/tazobactam IVPB.. 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      Pancreatic cyst      History of myelofibrosis      History of myelofibrosis      Peritoneal dialysis catheter in place  Placed 8/9/2017      Hemoperitoneum as complication of peritoneal dialysis  s/p removal 9/18      AV fistula  Placed 9/18          FAMILY HISTORY:  Family history of hypertension in mother    Family history of malignant neoplasm (Grandparent)  head and neck in father    FH: cirrhosis (Sibling)        SOCIAL HISTORY: No EtOH, no tobacco    REVIEW OF SYSTEMS:    CONSTITUTIONAL: +fever  EYES/ENT: No visual changes;  No vertigo or throat pain   NECK: No pain or stiffness  RESPIRATORY: +subjective SOB  CARDIOVASCULAR: +chest pain, pain w/ deep palpation of chest wall, does not radiate  GASTROINTESTINAL: +abdominal pain  GENITOURINARY: No dysuria, frequency or hematuria  NEUROLOGICAL: No numbness or weakness  SKIN: No itching, burning, rashes, or lesions   All other review of systems is negative unless indicated above.        T(F): 97.8 (10-01-22 @ 08:55), Max: 100.7 (09-30-22 @ 19:36)  HR: 61 (10-01-22 @ 08:55)  BP: 118/78 (10-01-22 @ 08:55)  RR: 18 (10-01-22 @ 08:55)  SpO2: 100% (10-01-22 @ 08:55)  Wt(kg): --    Constitutional: NAD  Eyes: EOMI, sclera non-icteric  Neck: supple  Respiratory: no inc wob  Cardiovascular: RRR, pain on palpation chest  Gastrointestinal: soft, tenderness to palpation in RUQ  Extremities: no cyanosis, no clubbing                          9.4    5.73  )-----------( 122      ( 01 Oct 2022 08:50 )             28.9       10-01    135  |  96  |  11  ----------------------------<  107<H>  3.8   |  26  |  3.89<H>    Ca    8.3<L>      01 Oct 2022 08:50    TPro  7.0  /  Alb  3.6  /  TBili  1.5<H>  /  DBili  x   /  AST  26  /  ALT  18  /  AlkPhos  83  10-01          RADIOLOGY & ADDITIONAL TESTS:  Studies reviewed.     Hematology Consult Note  ***recs not final until attending attestation***    Mumtaz Resendiz MD PGY-4  Reachable on TEAMS    HPI:  61 yo F hx of myelofibrosis, polycythemia vera, ESRD on [facility] schedule via LUE AVF (last dialysis W), portal HTN, subclavian port for transfusions, gastric varices, gallstones, recent ERCP for choledocolithiasis s/p biliary stent, c/b post ERCP pancreatitis + pneumobilia now presenting with fever and RUQ and epigastric pain. States pain has been ongoing for past 2 weeks, and has been in ER 3 times over this time. [place] chills last night, noted to be febrile this morning. Nausea and few episodes of nbnb emesis this AM.    ED Course:  Febrile to 100.5. Normal HR and BP. Pt was given tylenol, zosyn, and blood cultures obtained. U/S abdomen showed no acute pathology, CBD stent with pneumobilia, marked splenomegaly with upper abdominal varices. CT CA/P with contrast showed the same. No imaging evidence of cirrhosis. General surgery with no intervention. Nephrology also consulted will dialyze as per schedule.    Admitted to medicine for further management. (30 Sep 2022 18:21)      Allergies    No Known Allergies    Intolerances        MEDICATIONS  (STANDING):  calcium acetate 1334 milliGRAM(s) Oral three times a day with meals  danazol 200 milliGRAM(s) Oral three times a day  epoetin filiberto-epbx (RETACRIT) Injectable 40463 Unit(s) IV Push <User Schedule>  multivitamin 1 Tablet(s) Oral daily  piperacillin/tazobactam IVPB.. 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      Pancreatic cyst      History of myelofibrosis      History of myelofibrosis      Peritoneal dialysis catheter in place  Placed 8/9/2017      Hemoperitoneum as complication of peritoneal dialysis  s/p removal 9/18      AV fistula  Placed 9/18          FAMILY HISTORY:  Family history of hypertension in mother    Family history of malignant neoplasm (Grandparent)  head and neck in father    FH: cirrhosis (Sibling)        SOCIAL HISTORY: No EtOH, no tobacco    REVIEW OF SYSTEMS:    CONSTITUTIONAL: +fever  EYES/ENT: No visual changes;  No vertigo or throat pain   NECK: No pain or stiffness  RESPIRATORY: +subjective SOB  CARDIOVASCULAR: +chest pain, pain w/ deep palpation of chest wall, does not radiate  GASTROINTESTINAL: +abdominal pain  GENITOURINARY: No dysuria, frequency or hematuria  NEUROLOGICAL: No numbness or weakness  SKIN: No itching, burning, rashes, or lesions   All other review of systems is negative unless indicated above.        T(F): 97.8 (10-01-22 @ 08:55), Max: 100.7 (09-30-22 @ 19:36)  HR: 61 (10-01-22 @ 08:55)  BP: 118/78 (10-01-22 @ 08:55)  RR: 18 (10-01-22 @ 08:55)  SpO2: 100% (10-01-22 @ 08:55)  Wt(kg): --    Constitutional: NAD  Eyes: EOMI, sclera non-icteric  Neck: supple  Respiratory: no inc wob  Cardiovascular: RRR, pain on palpation chest  Gastrointestinal: soft, tenderness to palpation in RUQ  Extremities: no cyanosis, no clubbing                          9.4    5.73  )-----------( 122      ( 01 Oct 2022 08:50 )             28.9       10-01    135  |  96  |  11  ----------------------------<  107<H>  3.8   |  26  |  3.89<H>    Ca    8.3<L>      01 Oct 2022 08:50    TPro  7.0  /  Alb  3.6  /  TBili  1.5<H>  /  DBili  x   /  AST  26  /  ALT  18  /  AlkPhos  83  10-01          RADIOLOGY & ADDITIONAL TESTS:  Studies reviewed.    Peripheral blood smear 10/1: tear drop cells, metamyelocytes, myelocytes, RBC and platelets hard to appreciate     Hematology Consult Note    Mumtaz Resendiz MD PGY-4  Reachable on TEAMS    HPI:  63 yo F hx of myelofibrosis, polycythemia vera, ESRD on [facility] schedule via LUE AVF (last dialysis W), portal HTN, subclavian port for transfusions, gastric varices, gallstones, recent ERCP for choledocolithiasis s/p biliary stent, c/b post ERCP pancreatitis + pneumobilia now presenting with fever and RUQ and epigastric pain. States pain has been ongoing for past 2 weeks, and has been in ER 3 times over this time. [place] chills last night, noted to be febrile this morning. Nausea and few episodes of nbnb emesis this AM.    ED Course:  Febrile to 100.5. Normal HR and BP. Pt was given tylenol, zosyn, and blood cultures obtained. U/S abdomen showed no acute pathology, CBD stent with pneumobilia, marked splenomegaly with upper abdominal varices. CT CA/P with contrast showed the same. No imaging evidence of cirrhosis. General surgery with no intervention. Nephrology also consulted will dialyze as per schedule.    Admitted to medicine for further management. (30 Sep 2022 18:21)      Allergies    No Known Allergies    Intolerances        MEDICATIONS  (STANDING):  calcium acetate 1334 milliGRAM(s) Oral three times a day with meals  danazol 200 milliGRAM(s) Oral three times a day  epoetin filiberto-epbx (RETACRIT) Injectable 93464 Unit(s) IV Push <User Schedule>  multivitamin 1 Tablet(s) Oral daily  piperacillin/tazobactam IVPB.. 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      Pancreatic cyst      History of myelofibrosis      History of myelofibrosis      Peritoneal dialysis catheter in place  Placed 8/9/2017      Hemoperitoneum as complication of peritoneal dialysis  s/p removal 9/18      AV fistula  Placed 9/18          FAMILY HISTORY:  Family history of hypertension in mother    Family history of malignant neoplasm (Grandparent)  head and neck in father    FH: cirrhosis (Sibling)        SOCIAL HISTORY: No EtOH, no tobacco    REVIEW OF SYSTEMS:    CONSTITUTIONAL: +fever  EYES/ENT: No visual changes;  No vertigo or throat pain   NECK: No pain or stiffness  RESPIRATORY: +subjective SOB  CARDIOVASCULAR: +chest pain, pain w/ deep palpation of chest wall, does not radiate  GASTROINTESTINAL: +abdominal pain  GENITOURINARY: No dysuria, frequency or hematuria  NEUROLOGICAL: No numbness or weakness  SKIN: No itching, burning, rashes, or lesions   All other review of systems is negative unless indicated above.        T(F): 97.8 (10-01-22 @ 08:55), Max: 100.7 (09-30-22 @ 19:36)  HR: 61 (10-01-22 @ 08:55)  BP: 118/78 (10-01-22 @ 08:55)  RR: 18 (10-01-22 @ 08:55)  SpO2: 100% (10-01-22 @ 08:55)  Wt(kg): --    Constitutional: NAD  Eyes: EOMI, sclera non-icteric  Neck: supple  Respiratory: no inc wob  Cardiovascular: RRR, pain on palpation chest  Gastrointestinal: soft, tenderness to palpation in RUQ  Extremities: no cyanosis, no clubbing                          9.4    5.73  )-----------( 122      ( 01 Oct 2022 08:50 )             28.9       10-01    135  |  96  |  11  ----------------------------<  107<H>  3.8   |  26  |  3.89<H>    Ca    8.3<L>      01 Oct 2022 08:50    TPro  7.0  /  Alb  3.6  /  TBili  1.5<H>  /  DBili  x   /  AST  26  /  ALT  18  /  AlkPhos  83  10-01          RADIOLOGY & ADDITIONAL TESTS:  Studies reviewed.    Peripheral blood smear 10/1: tear drop cells, neutrophil predominance but presence of immature cells, metamyelocytes, myelocytes, overdrying of stain made red and platelet morphology difficult to evaluate      Hematology Consult Note    Mumtaz Resendiz MD PGY-4  Reachable on TEAMS  [place]  used Deanne #[id number]    HPI:  63 yo F hx of myelofibrosis, polycythemia vera, ESRD on [facility] schedule via LUE AVF (last dialysis W), portal HTN, subclavian port for transfusions, gastric varices, gallstones, recent ERCP for choledocolithiasis s/p biliary stent, c/b post ERCP pancreatitis + pneumobilia now presenting with fever and RUQ and epigastric pain. States pain has been ongoing for past 2 weeks, and has been in ER 3 times over this time. [place] chills last night, noted to be febrile this morning. Nausea and few episodes of nbnb emesis this AM.    ED Course:  Febrile to 100.5. Normal HR and BP. Pt was given tylenol, zosyn, and blood cultures obtained. U/S abdomen showed no acute pathology, CBD stent with pneumobilia, marked splenomegaly with upper abdominal varices. CT CA/P with contrast showed the same. No imaging evidence of cirrhosis. General surgery with no intervention. Nephrology also consulted will dialyze as per schedule.    Admitted to medicine for further management. (30 Sep 2022 18:21)      Allergies    No Known Allergies    Intolerances        MEDICATIONS  (STANDING):  calcium acetate 1334 milliGRAM(s) Oral three times a day with meals  danazol 200 milliGRAM(s) Oral three times a day  epoetin filiberto-epbx (RETACRIT) Injectable 29725 Unit(s) IV Push <User Schedule>  multivitamin 1 Tablet(s) Oral daily  piperacillin/tazobactam IVPB.. 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      Pancreatic cyst      History of myelofibrosis      History of myelofibrosis      Peritoneal dialysis catheter in place  Placed 8/9/2017      Hemoperitoneum as complication of peritoneal dialysis  s/p removal 9/18      AV fistula  Placed 9/18          FAMILY HISTORY:  Family history of hypertension in mother    Family history of malignant neoplasm (Grandparent)  head and neck in father    FH: cirrhosis (Sibling)        SOCIAL HISTORY: No EtOH, no tobacco    REVIEW OF SYSTEMS:    CONSTITUTIONAL: +fever  EYES/ENT: No visual changes;  No vertigo or throat pain   NECK: No pain or stiffness  RESPIRATORY: +subjective SOB  CARDIOVASCULAR: +chest pain, pain w/ deep palpation of chest wall, does not radiate  GASTROINTESTINAL: +abdominal pain  GENITOURINARY: No dysuria, frequency or hematuria  NEUROLOGICAL: No numbness or weakness  SKIN: No itching, burning, rashes, or lesions   All other review of systems is negative unless indicated above.        T(F): 97.8 (10-01-22 @ 08:55), Max: 100.7 (09-30-22 @ 19:36)  HR: 61 (10-01-22 @ 08:55)  BP: 118/78 (10-01-22 @ 08:55)  RR: 18 (10-01-22 @ 08:55)  SpO2: 100% (10-01-22 @ 08:55)  Wt(kg): --    Constitutional: NAD  Eyes: EOMI, sclera non-icteric  Neck: supple  Respiratory: no inc wob  Cardiovascular: RRR, pain on palpation chest  Gastrointestinal: soft, tenderness to palpation in RUQ  Extremities: no cyanosis, no clubbing                          9.4    5.73  )-----------( 122      ( 01 Oct 2022 08:50 )             28.9       10-01    135  |  96  |  11  ----------------------------<  107<H>  3.8   |  26  |  3.89<H>    Ca    8.3<L>      01 Oct 2022 08:50    TPro  7.0  /  Alb  3.6  /  TBili  1.5<H>  /  DBili  x   /  AST  26  /  ALT  18  /  AlkPhos  83  10-01          RADIOLOGY & ADDITIONAL TESTS:  Studies reviewed.    Peripheral blood smear 10/1: tear drop cells, neutrophil predominance but presence of immature cells, metamyelocytes, myelocytes, overdrying of stain made red and platelet morphology difficult to evaluate

## 2022-10-01 NOTE — PROGRESS NOTE ADULT - PROBLEM SELECTOR PLAN 1
- Last admission pt with cholelithiasis and choledocholithiasis. Had ERCP with stone removal c/b post procedure pancreatitis that delayed surgery. Cholelithiasis and a focus of antidependent air seen on today's CT.   - Etiology of abdominal pain could be new choledocholithiasis (stent patent on CT) vs stent closure vs cholelithiasis.  - GI emailed  - Patient without elevated WBC but has myelofibrosis. Febrile. Blood cultures sent. Empiric zosyn 9/30-->  - Diet as tolerated. - Last admission pt with cholelithiasis and choledocholithiasis. Had ERCP with stone removal c/b post procedure pancreatitis that delayed surgery. Cholelithiasis and a focus of antidependent air seen on today's CT.   - Etiology of abdominal pain could be new choledocholithiasis (stent patent on CT) vs stent closure vs cholelithiasis.  - GI emailed  - Patient without elevated WBC but has myelofibrosis. Febrile. Blood cultures sent. Empiric zosyn 9/30-->  - Lactate 0.5 no concern for mesenteric ischemia  - Diet as tolerated. - Last admission pt with cholelithiasis and choledocholithiasis. Had ERCP with stone removal c/b post procedure pancreatitis that delayed surgery. Cholelithiasis and a focus of antidependent air seen on CT. Gallbladder wall thickening/edema on CT from 9/26.   - Etiology of abdominal pain likely cholecystitis. Bilirubin slightly elevated so less concern for obstruction.  - GI emailed. F/u surgery  - Patient without elevated WBC but has myelofibrosis. Febrile. Blood cultures sent. Empiric zosyn 9/30-->  - Lactate 0.5 no concern for mesenteric ischemia  - Diet as tolerated.

## 2022-10-01 NOTE — PATIENT PROFILE ADULT - FALL HARM RISK - HARM RISK INTERVENTIONS
Assistance with ambulation/Assistance OOB with selected safe patient handling equipment/Communicate Risk of Fall with Harm to all staff/Discuss with provider need for PT consult/Monitor gait and stability/Provide patient with walking aids - walker, cane, crutches/Reinforce activity limits and safety measures with patient and family/Sit up slowly, dangle for a short time, stand at bedside before walking/Tailored Fall Risk Interventions/Use of alarms - bed, chair and/or voice tab/Visual Cue: Yellow wristband and red socks/Bed in lowest position, wheels locked, appropriate side rails in place/Call bell, personal items and telephone in reach/Instruct patient to call for assistance before getting out of bed or chair/Non-slip footwear when patient is out of bed/[place] to call system/Physically safe environment - no spills, clutter or unnecessary equipment/Purposeful Proactive Rounding/Room/bathroom lighting operational, light cord in reach

## 2022-10-02 LAB
ALBUMIN SERPL ELPH-MCNC: 3.3 G/DL — SIGNIFICANT CHANGE UP (ref 3.3–5)
ALP SERPL-CCNC: 75 U/L — SIGNIFICANT CHANGE UP (ref 40–120)
ALT FLD-CCNC: 16 U/L — SIGNIFICANT CHANGE UP (ref 10–45)
AMYLASE P1 CFR SERPL: 83 U/L — SIGNIFICANT CHANGE UP (ref 25–125)
ANION GAP SERPL CALC-SCNC: 13 MMOL/L — SIGNIFICANT CHANGE UP (ref 5–17)
AST SERPL-CCNC: 21 U/L — SIGNIFICANT CHANGE UP (ref 10–40)
BASOPHILS # BLD AUTO: 0.07 K/UL — SIGNIFICANT CHANGE UP (ref 0–0.2)
BASOPHILS NFR BLD AUTO: 1.4 % — SIGNIFICANT CHANGE UP (ref 0–2)
BILIRUB SERPL-MCNC: 1.1 MG/DL — SIGNIFICANT CHANGE UP (ref 0.2–1.2)
BUN SERPL-MCNC: 19 MG/DL — SIGNIFICANT CHANGE UP (ref 7–23)
CALCIUM SERPL-MCNC: 7.8 MG/DL — LOW (ref 8.4–10.5)
CHLORIDE SERPL-SCNC: 97 MMOL/L — SIGNIFICANT CHANGE UP (ref 96–108)
CO2 SERPL-SCNC: 24 MMOL/L — SIGNIFICANT CHANGE UP (ref 22–31)
CREAT SERPL-MCNC: 5.4 MG/DL — HIGH (ref 0.5–1.3)
EGFR: 8 ML/MIN/1.73M2 — LOW
EOSINOPHIL # BLD AUTO: 0.09 K/UL — SIGNIFICANT CHANGE UP (ref 0–0.5)
EOSINOPHIL NFR BLD AUTO: 1.8 % — SIGNIFICANT CHANGE UP (ref 0–6)
GLUCOSE SERPL-MCNC: 147 MG/DL — HIGH (ref 70–99)
HCT VFR BLD CALC: 27.4 % — LOW (ref 34.5–45)
HGB BLD-MCNC: 8.7 G/DL — LOW (ref 11.5–15.5)
IMM GRANULOCYTES NFR BLD AUTO: 9.7 % — HIGH (ref 0–0.9)
LIDOCAIN IGE QN: 257 U/L — HIGH (ref 7–60)
LYMPHOCYTES # BLD AUTO: 0.79 K/UL — LOW (ref 1–3.3)
LYMPHOCYTES # BLD AUTO: 15.7 % — SIGNIFICANT CHANGE UP (ref 13–44)
MAGNESIUM SERPL-MCNC: 2 MG/DL — SIGNIFICANT CHANGE UP (ref 1.6–2.6)
MCHC RBC-ENTMCNC: 30.1 PG — SIGNIFICANT CHANGE UP (ref 27–34)
MCHC RBC-ENTMCNC: 31.8 GM/DL — LOW (ref 32–36)
MCV RBC AUTO: 94.8 FL — SIGNIFICANT CHANGE UP (ref 80–100)
MONOCYTES # BLD AUTO: 0.49 K/UL — SIGNIFICANT CHANGE UP (ref 0–0.9)
MONOCYTES NFR BLD AUTO: 9.7 % — SIGNIFICANT CHANGE UP (ref 2–14)
NEUTROPHILS # BLD AUTO: 3.1 K/UL — SIGNIFICANT CHANGE UP (ref 1.8–7.4)
NEUTROPHILS NFR BLD AUTO: 61.7 % — SIGNIFICANT CHANGE UP (ref 43–77)
NRBC # BLD: 2 /100 WBCS — HIGH (ref 0–0)
PHOSPHATE SERPL-MCNC: 2.2 MG/DL — LOW (ref 2.5–4.5)
PLATELET # BLD AUTO: 103 K/UL — LOW (ref 150–400)
POTASSIUM SERPL-MCNC: 3.8 MMOL/L — SIGNIFICANT CHANGE UP (ref 3.5–5.3)
POTASSIUM SERPL-SCNC: 3.8 MMOL/L — SIGNIFICANT CHANGE UP (ref 3.5–5.3)
PROT SERPL-MCNC: 6.2 G/DL — SIGNIFICANT CHANGE UP (ref 6–8.3)
RBC # BLD: 2.89 M/UL — LOW (ref 3.8–5.2)
RBC # FLD: 19.5 % — HIGH (ref 10.3–14.5)
SODIUM SERPL-SCNC: 134 MMOL/L — LOW (ref 135–145)
WBC # BLD: 5.03 K/UL — SIGNIFICANT CHANGE UP (ref 3.8–10.5)
WBC # FLD AUTO: 5.03 K/UL — SIGNIFICANT CHANGE UP (ref 3.8–10.5)

## 2022-10-02 PROCEDURE — 99223 1ST HOSP IP/OBS HIGH 75: CPT | Mod: GC

## 2022-10-02 PROCEDURE — 99233 SBSQ HOSP IP/OBS HIGH 50: CPT | Mod: GC

## 2022-10-02 RX ORDER — CARVEDILOL PHOSPHATE 80 MG/1
3.12 CAPSULE, EXTENDED RELEASE ORAL EVERY 12 HOURS
Refills: 0 | Status: DISCONTINUED | OUTPATIENT
Start: 2022-10-03 | End: 2022-10-12

## 2022-10-02 RX ORDER — OXYCODONE HYDROCHLORIDE 5 MG/1
5 TABLET ORAL ONCE
Refills: 0 | Status: DISCONTINUED | OUTPATIENT
Start: 2022-10-02 | End: 2022-10-02

## 2022-10-02 RX ORDER — CHLORHEXIDINE GLUCONATE 213 G/1000ML
1 SOLUTION TOPICAL DAILY
Refills: 0 | Status: DISCONTINUED | OUTPATIENT
Start: 2022-10-02 | End: 2022-10-12

## 2022-10-02 RX ADMIN — DANAZOL 200 MILLIGRAM(S): 200 CAPSULE ORAL at 17:10

## 2022-10-02 RX ADMIN — DANAZOL 200 MILLIGRAM(S): 200 CAPSULE ORAL at 22:29

## 2022-10-02 RX ADMIN — OXYCODONE HYDROCHLORIDE 5 MILLIGRAM(S): 5 TABLET ORAL at 17:14

## 2022-10-02 RX ADMIN — Medication 1334 MILLIGRAM(S): at 08:55

## 2022-10-02 RX ADMIN — OXYCODONE HYDROCHLORIDE 5 MILLIGRAM(S): 5 TABLET ORAL at 09:25

## 2022-10-02 RX ADMIN — CHLORHEXIDINE GLUCONATE 1 APPLICATION(S): 213 SOLUTION TOPICAL at 14:19

## 2022-10-02 RX ADMIN — PIPERACILLIN AND TAZOBACTAM 25 GRAM(S): 4; .5 INJECTION, POWDER, LYOPHILIZED, FOR SOLUTION INTRAVENOUS at 14:14

## 2022-10-02 RX ADMIN — Medication 1334 MILLIGRAM(S): at 14:14

## 2022-10-02 RX ADMIN — DANAZOL 200 MILLIGRAM(S): 200 CAPSULE ORAL at 05:34

## 2022-10-02 RX ADMIN — Medication 1 TABLET(S): at 14:14

## 2022-10-02 RX ADMIN — OXYCODONE HYDROCHLORIDE 5 MILLIGRAM(S): 5 TABLET ORAL at 00:03

## 2022-10-02 RX ADMIN — Medication 1334 MILLIGRAM(S): at 17:13

## 2022-10-02 RX ADMIN — OXYCODONE HYDROCHLORIDE 5 MILLIGRAM(S): 5 TABLET ORAL at 08:55

## 2022-10-02 NOTE — CONSULT NOTE ADULT - ATTENDING COMMENTS
Patient seen and examined in ED with surgical house staff  Recently admitted with choledocholithiasis, had ERCP & post-ERCP pancreatitis  Also recently found to have portal hypertension with gastric varices (seen by CT & during ERCP) & splenomegaly  Now returns to ED with RUQ pain    On exam  non-toxic appearing  Hemodynamically stable  Oxygenating well  ? mild RUQ tenderness, no peritoneal signs  WBC, LFTs = WNL  CT without signs of acute cholecystitis    -   - ? unclear etiology of portal hypertension & subacute RUQ pain ?  - Likely cirrhosis, but liver appears WNL on imaging  - Although with recent history of choledocholithiasis, no indications for emergent cholecystectomy at this time, needs evaluation and management of portal hypertension before consideration of any biliary surgical procedures (which can be dangerous in patients with portal hypertension)  - will follow
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  She has gone to the ED three times for similar pains.  On exam, she has tenderness in the RUQ consistent with biliary colic.  Will refer patient to Dr. Forde from liver surgery for consideration for cholecystectomy.  Awaiting MRI/MRCP.  Stop propranolol and switch to coreg 3.125 mg po BID to further decrease portal hypertension.  on recent EGD, gastric varices appears moderate in size and not at risk of bleeding.
[place] used for interview. Pt with PV that had transformed to myelfibrosis and has been managed with Jakafi along with danazol. She had recent gall stones and had stent placed. She is currently admitted with low grade fever. She had received 2 units PRBC due to worsening anemia (baseline H/H was 8 to 9 as outpatient. Started having chest tightness develop with 2nd unit. Currently denies any SOB, SANTAMARIA, or radiation of pain down arm. On exam, lungs clear, pt in NAD, abdomen mild tenderness RUQ, no pitting BLE edema. Will monitor her counts while she is in house and would continue Jakafi on dialysis days and danazole.

## 2022-10-02 NOTE — CONSULT NOTE ADULT - SUBJECTIVE AND OBJECTIVE BOX
Transplant Surgery - Consult Note  --------------------------------------------------------------    HPI:    61 yo F with myelofibrosis,  PV (KAK2+), ESRD on HD, HTN, noncirrhotic portal HTN 2/2 Nodular regenerative hyperplasia complicated with  gastric varices here ongoing biliary colic pain.  Hepatology called to weigh in. Briefly, patient known to have gastric varices since 2014 on imaging. s/p liver biopsy w/ elevated portosystemic gradient of 9 with histology demonstrating nodular regenerative hyperplasia but no fibrosis. Underwent EGD in 2021 with gastric varices (IGV1 and IGV2) not amenable to endoscopic therapy. Patient had recent ERCP done on 9/22/22 for choledocholithiasis s/p metal stent.     Today, patient continue to have ongoing abdominal discomfort. Pain unchanged since having the stent.         MEDICATIONS  (STANDING):  calcium acetate 1334 milliGRAM(s) Oral three times a day with meals  chlorhexidine 2% Cloths 1 Application(s) Topical daily  danazol 200 milliGRAM(s) Oral three times a day  epoetin filiberto-epbx (RETACRIT) Injectable 11678 Unit(s) IV Push <User Schedule>  multivitamin 1 Tablet(s) Oral daily  piperacillin/tazobactam IVPB.. 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      Pancreatic cyst      History of myelofibrosis      History of myelofibrosis      Peritoneal dialysis catheter in place  Placed 8/9/2017      Hemoperitoneum as complication of peritoneal dialysis  s/p removal 9/18      AV fistula  Placed 9/18          Vital Signs Last 24 Hrs  T(C): 37.1 (02 Oct 2022 11:33), Max: 37.1 (02 Oct 2022 11:33)  T(F): 98.7 (02 Oct 2022 11:33), Max: 98.7 (02 Oct 2022 11:33)  HR: 65 (02 Oct 2022 11:33) (65 - 66)  BP: 126/77 (02 Oct 2022 11:33) (116/70 - 127/74)  BP(mean): --  RR: 18 (02 Oct 2022 11:33) (17 - 18)  SpO2: 100% (02 Oct 2022 11:33) (96% - 100%)    Parameters below as of 02 Oct 2022 11:33  Patient On (Oxygen Delivery Method): room air        I&O's Summary    02 Oct 2022 07:01  -  02 Oct 2022 17:37  --------------------------------------------------------  IN: 480 mL / OUT: 0 mL / NET: 480 mL                              8.7    5.03  )-----------( 103      ( 02 Oct 2022 06:53 )             27.4     10-02    134<L>  |  97  |  19  ----------------------------<  147<H>  3.8   |  24  |  5.40<H>    Ca    7.8<L>      02 Oct 2022 06:51  Phos  2.2     10-02  Mg     2.0     10-02    TPro  6.2  /  Alb  3.3  /  TBili  1.1  /  DBili  x   /  AST  21  /  ALT  16  /  AlkPhos  75  10-02        Review of systems  General:  No fevers, chills or night sweats  ENT:  No sore throat or dysphagia  CV:  No pain or palpitations  Resp:  No dyspnea, cough or  wheezing  GI:  as above  Skin:  No rash or edema  Neuro: no weakness   Hematologic: no bleeding  Musculoskeletal: no muscle pain or join pain  Psych: no agitation     : no dysuria      PHYSICAL EXAM:   GENERAL:  NAD, Appears stated age  HEENT:  NC/AT,  conjunctivae clear and pink, sclera -anicteric  CHEST:  CTA B/L, Normal effort  HEART:  RRR S1/S2,  ABDOMEN:  Soft, tender  EXTREMITIES:  No cyanosis or Edema  SKIN:  Warm & Dry. No rash or erythema  NEURO:  Alert, oriented, no focal deficit

## 2022-10-02 NOTE — CONSULT NOTE ADULT - SUBJECTIVE AND OBJECTIVE BOX
HPI:    61 yo F with PV (KAK2+), ESRD on HD, HTN, noncirrhotic portal HTN complicated with known gastric varices    Patient known to have gastric varices since 2014 on imaging. s/p liver biopsy w/ elevated portosystemic gradient of 9 with histology demonstrating focal nodular hyperplasia but no fibrosis. Underwent EGD in 2021 with gastric varices (IGV1 and IGV2) not amenable to endoscopic therapy.     Allergies:  No Known Allergies        Hospital Medications:  aluminum hydroxide/magnesium hydroxide/simethicone Suspension 30 milliLiter(s) Oral every 4 hours PRN  calcium acetate 1334 milliGRAM(s) Oral three times a day with meals  danazol 200 milliGRAM(s) Oral three times a day  epoetin filiberto-epbx (RETACRIT) Injectable 80044 Unit(s) IV Push <User Schedule>  multivitamin 1 Tablet(s) Oral daily  oxyCODONE    IR 5 milliGRAM(s) Oral once  piperacillin/tazobactam IVPB.. 3.375 Gram(s) IV Intermittent every 12 hours  propranolol 10 milliGRAM(s) Oral daily  ruxolitinib 10 milliGRAM(s) Oral <User Schedule>      PMHX/PSHX:  Polycythemia vera    Peptic ulcer disease    Hypertension    Splenomegaly    Splenic infarct    ESRD on peritoneal dialysis    Cirrhosis of liver without ascites, unspecified hepatic cirrhosis type    Pancreatic cyst    History of myelofibrosis    History of myelofibrosis    No significant past surgical history    Peritoneal dialysis catheter in place    Hemoperitoneum as complication of peritoneal dialysis    AV fistula        Family history:  No pertinent family history in first degree relatives    Family history of hypertension in mother    Family history of malignant neoplasm (Grandparent)    FH: cirrhosis (Sibling)        Social History: no smoking    ROS:   General:  No fevers, chills or night sweats  ENT:  No sore throat or dysphagia  CV:  No pain or palpitations  Resp:  No dyspnea, cough or  wheezing  GI:  as above  Skin:  No rash or edema  Neuro: no weakness   Hematologic: no bleeding  Musculoskeletal: no muscle pain or join pain  Psych: no agitation     : no dysuria      PHYSICAL EXAM:   GENERAL:  NAD, Appears stated age  HEENT:  NC/AT,  conjunctivae clear and pink, sclera -anicteric  CHEST:  CTA B/L, Normal effort  HEART:  RRR S1/S2,  ABDOMEN:  Soft, non-tender, non-distended,  no masses   EXTREMITIES:  No cyanosis or Edema  SKIN:  Warm & Dry. No rash or erythema  NEURO:  Alert, oriented, no focal deficit    Vital Signs:  Vital Signs Last 24 Hrs  T(C): 36.4 (02 Oct 2022 05:22), Max: 36.8 (01 Oct 2022 20:36)  T(F): 97.6 (02 Oct 2022 05:22), Max: 98.2 (01 Oct 2022 20:36)  HR: 66 (02 Oct 2022 05:22) (61 - 66)  BP: 116/70 (02 Oct 2022 05:22) (116/70 - 127/74)  BP(mean): --  RR: 18 (02 Oct 2022 05:22) (17 - 18)  SpO2: 96% (02 Oct 2022 05:22) (96% - 100%)    Parameters below as of 02 Oct 2022 05:22  Patient On (Oxygen Delivery Method): room air      Daily     Daily     LABS:                        8.7    5.03  )-----------( 103      ( 02 Oct 2022 06:53 )             27.4     Mean Cell Volume: 94.8 fl (10-02-22 @ 06:53)    10-01    135  |  96  |  11  ----------------------------<  107<H>  3.8   |  26  |  3.89<H>    Ca    8.3<L>      01 Oct 2022 08:50    TPro  7.0  /  Alb  3.6  /  TBili  1.5<H>  /  DBili  x   /  AST  26  /  ALT  18  /  AlkPhos  83  10-01    LIVER FUNCTIONS - ( 01 Oct 2022 08:50 )  Alb: 3.6 g/dL / Pro: 7.0 g/dL / ALK PHOS: 83 U/L / ALT: 18 U/L / AST: 26 U/L / GGT: x           PT/INR - ( 01 Oct 2022 08:50 )   PT: 13.5 sec;   INR: 1.16 ratio         PTT - ( 30 Sep 2022 11:38 )  PTT:43.8 sec                            8.7    5.03  )-----------( 103      ( 02 Oct 2022 06:53 )             27.4                         9.4    5.73  )-----------( 122      ( 01 Oct 2022 08:50 )             28.9                         7.8    4.08  )-----------( 117      ( 01 Oct 2022 00:10 )             25.2                         7.0    4.97  )-----------( 114      ( 30 Sep 2022 11:38 )             22.9     Imaging:     HPI:    61 yo F with myelofibrosis,  PV (KAK2+), ESRD on HD, HTN, noncirrhotic portal 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 Briefly, patient known to have gastric varices since 2014 on imaging. s/p liver biopsy w/ elevated portosystemic gradient of 9 with histology demonstrating focal nodular hyperplasia but no fibrosis. Underwent EGD in 2021 with gastric varices (IGV1 and IGV2) not amenable to endoscopic therapy. Patient had recent ERCP done on 9/22/22 for choledocholithiasis s/p metal stent.     Today, patient continue to have ongoing abdominal discomfort. Pain unchanged since having the stent.     Allergies:  No Known Allergies        Hospital Medications:  aluminum hydroxide/magnesium hydroxide/simethicone Suspension 30 milliLiter(s) Oral every 4 hours PRN  calcium acetate 1334 milliGRAM(s) Oral three times a day with meals  danazol 200 milliGRAM(s) Oral three times a day  epoetin filiberto-epbx (RETACRIT) Injectable 01271 Unit(s) IV Push <User Schedule>  multivitamin 1 Tablet(s) Oral daily  oxyCODONE    IR 5 milliGRAM(s) Oral once  piperacillin/tazobactam IVPB.. 3.375 Gram(s) IV Intermittent every 12 hours  propranolol 10 milliGRAM(s) Oral daily  ruxolitinib 10 milliGRAM(s) Oral <User Schedule>      PMHX/PSHX:  Polycythemia vera    Peptic ulcer disease    Hypertension    Splenomegaly    Splenic infarct    ESRD on peritoneal dialysis    Cirrhosis of liver without ascites, unspecified hepatic cirrhosis type    Pancreatic cyst    History of myelofibrosis    History of myelofibrosis    No significant past surgical history    Peritoneal dialysis catheter in place    Hemoperitoneum as complication of peritoneal dialysis    AV fistula        Family history:  No pertinent family history in first degree relatives    Family history of hypertension in mother    Family history of malignant neoplasm (Grandparent)    FH: cirrhosis (Sibling)        Social History: no smoking    ROS:   General:  No fevers, chills or night sweats  ENT:  No sore throat or dysphagia  CV:  No pain or palpitations  Resp:  No dyspnea, cough or  wheezing  GI:  as above  Skin:  No rash or edema  Neuro: no weakness   Hematologic: no bleeding  Musculoskeletal: no muscle pain or join pain  Psych: no agitation     : no dysuria      PHYSICAL EXAM:   GENERAL:  NAD, Appears stated age  HEENT:  NC/AT,  conjunctivae clear and pink, sclera -anicteric  CHEST:  CTA B/L, Normal effort  HEART:  RRR S1/S2,  ABDOMEN:  Soft, tender  EXTREMITIES:  No cyanosis or Edema  SKIN:  Warm & Dry. No rash or erythema  NEURO:  Alert, oriented, no focal deficit    Vital Signs:  Vital Signs Last 24 Hrs  T(C): 36.4 (02 Oct 2022 05:22), Max: 36.8 (01 Oct 2022 20:36)  T(F): 97.6 (02 Oct 2022 05:22), Max: 98.2 (01 Oct 2022 20:36)  HR: 66 (02 Oct 2022 05:22) (61 - 66)  BP: 116/70 (02 Oct 2022 05:22) (116/70 - 127/74)  BP(mean): --  RR: 18 (02 Oct 2022 05:22) (17 - 18)  SpO2: 96% (02 Oct 2022 05:22) (96% - 100%)    Parameters below as of 02 Oct 2022 05:22  Patient On (Oxygen Delivery Method): room air      Daily     Daily     LABS:                        8.7    5.03  )-----------( 103      ( 02 Oct 2022 06:53 )             27.4     Mean Cell Volume: 94.8 fl (10-02-22 @ 06:53)    10-01    135  |  96  |  11  ----------------------------<  107<H>  3.8   |  26  |  3.89<H>    Ca    8.3<L>      01 Oct 2022 08:50    TPro  7.0  /  Alb  3.6  /  TBili  1.5<H>  /  DBili  x   /  AST  26  /  ALT  18  /  AlkPhos  83  10-01    LIVER FUNCTIONS - ( 01 Oct 2022 08:50 )  Alb: 3.6 g/dL / Pro: 7.0 g/dL / ALK PHOS: 83 U/L / ALT: 18 U/L / AST: 26 U/L / GGT: x           PT/INR - ( 01 Oct 2022 08:50 )   PT: 13.5 sec;   INR: 1.16 ratio         PTT - ( 30 Sep 2022 11:38 )  PTT:43.8 sec                            8.7    5.03  )-----------( 103      ( 02 Oct 2022 06:53 )             27.4                         9.4    5.73  )-----------( 122      ( 01 Oct 2022 08:50 )             28.9                         7.8    4.08  )-----------( 117      ( 01 Oct 2022 00:10 )             25.2                         7.0    4.97  )-----------( 114      ( 30 Sep 2022 11:38 )             22.9     Imaging:  Reviewed    HPI:    61 yo F with myelofibrosis,  PV (KAK2+), ESRD on HD, HTN, noncirrhotic portal HTN 2/2 Nodular regenerative hyperplasia complicated with  gastric varices here ongoing biliary colic pain.  Hepatology called to weigh in. Briefly, patient known to have gastric varices since 2014 on imaging. s/p liver biopsy w/ elevated portosystemic gradient of 9 with histology demonstrating focal nodular hyperplasia but no fibrosis. Underwent EGD in 2021 with gastric varices (IGV1 and IGV2) not amenable to endoscopic therapy. Patient had recent ERCP done on 9/22/22 for choledocholithiasis s/p metal stent.     Today, patient continue to have ongoing abdominal discomfort. Pain unchanged since having the stent.     Allergies:  No Known Allergies        Hospital Medications:  aluminum hydroxide/magnesium hydroxide/simethicone Suspension 30 milliLiter(s) Oral every 4 hours PRN  calcium acetate 1334 milliGRAM(s) Oral three times a day with meals  danazol 200 milliGRAM(s) Oral three times a day  epoetin filiberto-epbx (RETACRIT) Injectable 97103 Unit(s) IV Push <User Schedule>  multivitamin 1 Tablet(s) Oral daily  oxyCODONE    IR 5 milliGRAM(s) Oral once  piperacillin/tazobactam IVPB.. 3.375 Gram(s) IV Intermittent every 12 hours  propranolol 10 milliGRAM(s) Oral daily  ruxolitinib 10 milliGRAM(s) Oral <User Schedule>      PMHX/PSHX:  Polycythemia vera    Peptic ulcer disease    Hypertension    Splenomegaly    Splenic infarct    ESRD on peritoneal dialysis    Cirrhosis of liver without ascites, unspecified hepatic cirrhosis type    Pancreatic cyst    History of myelofibrosis    History of myelofibrosis    No significant past surgical history    Peritoneal dialysis catheter in place    Hemoperitoneum as complication of peritoneal dialysis    AV fistula        Family history:  No pertinent family history in first degree relatives    Family history of hypertension in mother    Family history of malignant neoplasm (Grandparent)    FH: cirrhosis (Sibling)        Social History: no smoking    ROS:   General:  No fevers, chills or night sweats  ENT:  No sore throat or dysphagia  CV:  No pain or palpitations  Resp:  No dyspnea, cough or  wheezing  GI:  as above  Skin:  No rash or edema  Neuro: no weakness   Hematologic: no bleeding  Musculoskeletal: no muscle pain or join pain  Psych: no agitation     : no dysuria      PHYSICAL EXAM:   GENERAL:  NAD, Appears stated age  HEENT:  NC/AT,  conjunctivae clear and pink, sclera -anicteric  CHEST:  CTA B/L, Normal effort  HEART:  RRR S1/S2,  ABDOMEN:  Soft, tender  EXTREMITIES:  No cyanosis or Edema  SKIN:  Warm & Dry. No rash or erythema  NEURO:  Alert, oriented, no focal deficit    Vital Signs:  Vital Signs Last 24 Hrs  T(C): 36.4 (02 Oct 2022 05:22), Max: 36.8 (01 Oct 2022 20:36)  T(F): 97.6 (02 Oct 2022 05:22), Max: 98.2 (01 Oct 2022 20:36)  HR: 66 (02 Oct 2022 05:22) (61 - 66)  BP: 116/70 (02 Oct 2022 05:22) (116/70 - 127/74)  BP(mean): --  RR: 18 (02 Oct 2022 05:22) (17 - 18)  SpO2: 96% (02 Oct 2022 05:22) (96% - 100%)    Parameters below as of 02 Oct 2022 05:22  Patient On (Oxygen Delivery Method): room air      Daily     Daily     LABS:                        8.7    5.03  )-----------( 103      ( 02 Oct 2022 06:53 )             27.4     Mean Cell Volume: 94.8 fl (10-02-22 @ 06:53)    10-01    135  |  96  |  11  ----------------------------<  107<H>  3.8   |  26  |  3.89<H>    Ca    8.3<L>      01 Oct 2022 08:50    TPro  7.0  /  Alb  3.6  /  TBili  1.5<H>  /  DBili  x   /  AST  26  /  ALT  18  /  AlkPhos  83  10-01    LIVER FUNCTIONS - ( 01 Oct 2022 08:50 )  Alb: 3.6 g/dL / Pro: 7.0 g/dL / ALK PHOS: 83 U/L / ALT: 18 U/L / AST: 26 U/L / GGT: x           PT/INR - ( 01 Oct 2022 08:50 )   PT: 13.5 sec;   INR: 1.16 ratio         PTT - ( 30 Sep 2022 11:38 )  PTT:43.8 sec                            8.7    5.03  )-----------( 103      ( 02 Oct 2022 06:53 )             27.4                         9.4    5.73  )-----------( 122      ( 01 Oct 2022 08:50 )             28.9                         7.8    4.08  )-----------( 117      ( 01 Oct 2022 00:10 )             25.2                         7.0    4.97  )-----------( 114      ( 30 Sep 2022 11:38 )             22.9     Imaging:  Reviewed    HPI:    61 yo F with myelofibrosis,  PV (KAK2+), ESRD on HD, HTN, noncirrhotic portal HTN 2/2 Nodular regenerative hyperplasia complicated with  gastric varices here ongoing biliary colic pain.  Hepatology called to weigh in. Briefly, patient known to have gastric varices since 2014 on imaging. s/p liver biopsy w/ elevated portosystemic gradient of 9 with histology demonstrating nodular regenerative hyperplasia but no fibrosis. Underwent EGD in 2021 with gastric varices (IGV1 and IGV2) not amenable to endoscopic therapy. Patient had recent ERCP done on 9/22/22 for choledocholithiasis s/p metal stent.     Today, patient continue to have ongoing abdominal discomfort. Pain unchanged since having the stent.     Allergies:  No Known Allergies        Hospital Medications:  aluminum hydroxide/magnesium hydroxide/simethicone Suspension 30 milliLiter(s) Oral every 4 hours PRN  calcium acetate 1334 milliGRAM(s) Oral three times a day with meals  danazol 200 milliGRAM(s) Oral three times a day  epoetin filiberto-epbx (RETACRIT) Injectable 13933 Unit(s) IV Push <User Schedule>  multivitamin 1 Tablet(s) Oral daily  oxyCODONE    IR 5 milliGRAM(s) Oral once  piperacillin/tazobactam IVPB.. 3.375 Gram(s) IV Intermittent every 12 hours  propranolol 10 milliGRAM(s) Oral daily  ruxolitinib 10 milliGRAM(s) Oral <User Schedule>      PMHX/PSHX:  Polycythemia vera    Peptic ulcer disease    Hypertension    Splenomegaly    Splenic infarct    ESRD on peritoneal dialysis    Cirrhosis of liver without ascites, unspecified hepatic cirrhosis type    Pancreatic cyst    History of myelofibrosis    History of myelofibrosis    No significant past surgical history    Peritoneal dialysis catheter in place    Hemoperitoneum as complication of peritoneal dialysis    AV fistula        Family history:  No pertinent family history in first degree relatives    Family history of hypertension in mother    Family history of malignant neoplasm (Grandparent)    FH: cirrhosis (Sibling)        Social History: no smoking    ROS:   General:  No fevers, chills or night sweats  ENT:  No sore throat or dysphagia  CV:  No pain or palpitations  Resp:  No dyspnea, cough or  wheezing  GI:  as above  Skin:  No rash or edema  Neuro: no weakness   Hematologic: no bleeding  Musculoskeletal: no muscle pain or join pain  Psych: no agitation     : no dysuria      PHYSICAL EXAM:   GENERAL:  NAD, Appears stated age  HEENT:  NC/AT,  conjunctivae clear and pink, sclera -anicteric  CHEST:  CTA B/L, Normal effort  HEART:  RRR S1/S2,  ABDOMEN:  Soft, tender  EXTREMITIES:  No cyanosis or Edema  SKIN:  Warm & Dry. No rash or erythema  NEURO:  Alert, oriented, no focal deficit    Vital Signs:  Vital Signs Last 24 Hrs  T(C): 36.4 (02 Oct 2022 05:22), Max: 36.8 (01 Oct 2022 20:36)  T(F): 97.6 (02 Oct 2022 05:22), Max: 98.2 (01 Oct 2022 20:36)  HR: 66 (02 Oct 2022 05:22) (61 - 66)  BP: 116/70 (02 Oct 2022 05:22) (116/70 - 127/74)  BP(mean): --  RR: 18 (02 Oct 2022 05:22) (17 - 18)  SpO2: 96% (02 Oct 2022 05:22) (96% - 100%)    Parameters below as of 02 Oct 2022 05:22  Patient On (Oxygen Delivery Method): room air      Daily     Daily     LABS:                        8.7    5.03  )-----------( 103      ( 02 Oct 2022 06:53 )             27.4     Mean Cell Volume: 94.8 fl (10-02-22 @ 06:53)    10-01    135  |  96  |  11  ----------------------------<  107<H>  3.8   |  26  |  3.89<H>    Ca    8.3<L>      01 Oct 2022 08:50    TPro  7.0  /  Alb  3.6  /  TBili  1.5<H>  /  DBili  x   /  AST  26  /  ALT  18  /  AlkPhos  83  10-01    LIVER FUNCTIONS - ( 01 Oct 2022 08:50 )  Alb: 3.6 g/dL / Pro: 7.0 g/dL / ALK PHOS: 83 U/L / ALT: 18 U/L / AST: 26 U/L / GGT: x           PT/INR - ( 01 Oct 2022 08:50 )   PT: 13.5 sec;   INR: 1.16 ratio         PTT - ( 30 Sep 2022 11:38 )  PTT:43.8 sec                            8.7    5.03  )-----------( 103      ( 02 Oct 2022 06:53 )             27.4                         9.4    5.73  )-----------( 122      ( 01 Oct 2022 08:50 )             28.9                         7.8    4.08  )-----------( 117      ( 01 Oct 2022 00:10 )             25.2                         7.0    4.97  )-----------( 114      ( 30 Sep 2022 11:38 )             22.9     Imaging:  Reviewed

## 2022-10-02 NOTE — PROGRESS NOTE ADULT - ASSESSMENT
61 yo F hx of myelofibrosis, polycythemia vera, ESRD on [facility] schedule via [facility] AV[facility] (last dialysis W), portal HTN, subclavian port for transfusions, gastric varices, gallstones, recent ERCP for choledocolithiasis s/p biliary stent, c/b post ERCP pancreatitis + pneumobilia now presenting with fever and RUQ and epigastric pain. Pending NM Hepatobiliary study  63 yo F hx of myelofibrosis, polycythemia vera, ESRD on [facility] schedule via LUE AVF (last dialysis W), portal HTN, subclavian port for transfusions, gastric varices, gallstones, recent ERCP for choledocolithiasis s/p biliary stent, c/b post ERCP pancreatitis now presenting with fever and RUQ and epigastric pain. Pending NM Hepatobiliary study

## 2022-10-02 NOTE — PROGRESS NOTE ADULT - ATTENDING COMMENTS
Patient seen and examined. Plan as discussed w/ Dr. Sawyer: continue to monitor postprandial RUQ pain in setting of ?choledolithiasis; for now will continue empiric Abx and f/u hepatology's recommendations as patient presented w/ fevers; lactate WNL, but continue serial abdominal examinations; continue home medications; appreciate nephrology's evaluation and heme-onc's recommendations.

## 2022-10-02 NOTE — PROGRESS NOTE ADULT - PROBLEM SELECTOR PLAN 5
- Present on CT from 9/27 and from 9/30. Might be the cause of her abdominal pain  - GI consult - Plan as above

## 2022-10-02 NOTE — PROGRESS NOTE ADULT - SUBJECTIVE AND OBJECTIVE BOX
Trauma Surgery Progress Note  Patient is a 62y old  Female who presents with a chief complaint of Abdominal pain (02 Oct 2022 07:08)      INTERVAL EVENTS: No acute events overnight.  SUBJECTIVE: Patient seen and examined at bedside with surgical team, patient without complaints.     OBJECTIVE:    Vital Signs Last 24 Hrs  T(C): 36.4 (02 Oct 2022 05:22), Max: 36.8 (01 Oct 2022 20:36)  T(F): 97.6 (02 Oct 2022 05:22), Max: 98.2 (01 Oct 2022 20:36)  HR: 66 (02 Oct 2022 05:22) (61 - 66)  BP: 116/70 (02 Oct 2022 05:22) (116/70 - 127/74)  BP(mean): --  RR: 18 (02 Oct 2022 05:22) (17 - 18)  SpO2: 96% (02 Oct 2022 05:22) (96% - 100%)    Parameters below as of 02 Oct 2022 05:22  Patient On (Oxygen Delivery Method): room air    I&O's Detail  MEDICATIONS  (STANDING):  calcium acetate 1334 milliGRAM(s) Oral three times a day with meals  chlorhexidine 2% Cloths 1 Application(s) Topical daily  danazol 200 milliGRAM(s) Oral three times a day  epoetin filiberto-epbx (RETACRIT) Injectable 09178 Unit(s) IV Push <User Schedule>  multivitamin 1 Tablet(s) Oral daily  oxyCODONE    IR 5 milliGRAM(s) Oral once  piperacillin/tazobactam IVPB.. 3.375 Gram(s) IV Intermittent every 12 hours  propranolol 10 milliGRAM(s) Oral daily  ruxolitinib 10 milliGRAM(s) Oral <User Schedule>    MEDICATIONS  (PRN):  aluminum hydroxide/magnesium hydroxide/simethicone Suspension 30 milliLiter(s) Oral every 4 hours PRN Dyspepsia      PHYSICAL EXAM:  Constitutional: A&Ox3, NAD  Respiratory: Unlabored breathing  Abdomen: Soft, nondistended, NTTP. No rebound or guarding.  Extremities: WWP, RAHMAN spontaneously    LABS:                        8.7    5.03  )-----------( 103      ( 02 Oct 2022 06:53 )             27.4     10-02    134<L>  |  97  |  19  ----------------------------<  147<H>  3.8   |  24  |  5.40<H>    Ca    7.8<L>      02 Oct 2022 06:51  Phos  2.2     10-02  Mg     2.0     10-02    TPro  6.2  /  Alb  3.3  /  TBili  1.1  /  DBili  x   /  AST  21  /  ALT  16  /  AlkPhos  75  10-02    PT/INR - ( 01 Oct 2022 08:50 )   PT: 13.5 sec;   INR: 1.16 ratio         PTT - ( 30 Sep 2022 11:38 )  PTT:43.8 sec  LIVER FUNCTIONS - ( 02 Oct 2022 06:51 )  Alb: 3.3 g/dL / Pro: 6.2 g/dL / ALK PHOS: 75 U/L / ALT: 16 U/L / AST: 21 U/L / GGT: x                 IMAGING:     Trauma Surgery Progress Note  Patient is a 62y old  Female who presents with a chief complaint of Abdominal pain (02 Oct 2022 07:08)      INTERVAL EVENTS: No acute events overnight.  SUBJECTIVE: Patient seen and examined at bedside with surgical team, patient has RUQ pain making it difficult to sleep last night    OBJECTIVE:    Vital Signs Last 24 Hrs  T(C): 36.4 (02 Oct 2022 05:22), Max: 36.8 (01 Oct 2022 20:36)  T(F): 97.6 (02 Oct 2022 05:22), Max: 98.2 (01 Oct 2022 20:36)  HR: 66 (02 Oct 2022 05:22) (61 - 66)  BP: 116/70 (02 Oct 2022 05:22) (116/70 - 127/74)  BP(mean): --  RR: 18 (02 Oct 2022 05:22) (17 - 18)  SpO2: 96% (02 Oct 2022 05:22) (96% - 100%)    Parameters below as of 02 Oct 2022 05:22  Patient On (Oxygen Delivery Method): room air    I&O's Detail  MEDICATIONS  (STANDING):  calcium acetate 1334 milliGRAM(s) Oral three times a day with meals  chlorhexidine 2% Cloths 1 Application(s) Topical daily  danazol 200 milliGRAM(s) Oral three times a day  epoetin filiberto-epbx (RETACRIT) Injectable 26927 Unit(s) IV Push <User Schedule>  multivitamin 1 Tablet(s) Oral daily  oxyCODONE    IR 5 milliGRAM(s) Oral once  piperacillin/tazobactam IVPB.. 3.375 Gram(s) IV Intermittent every 12 hours  propranolol 10 milliGRAM(s) Oral daily  ruxolitinib 10 milliGRAM(s) Oral <User Schedule>    MEDICATIONS  (PRN):  aluminum hydroxide/magnesium hydroxide/simethicone Suspension 30 milliLiter(s) Oral every 4 hours PRN Dyspepsia      PHYSICAL EXAM:  Constitutional: A&Ox3, NAD  Respiratory: Unlabored breathing  Abdomen: Soft, nondistended, TTP. No rebound or guarding.  Extremities: WWP, RAHMAN spontaneously    LABS:                        8.7    5.03  )-----------( 103      ( 02 Oct 2022 06:53 )             27.4     10-02    134<L>  |  97  |  19  ----------------------------<  147<H>  3.8   |  24  |  5.40<H>    Ca    7.8<L>      02 Oct 2022 06:51  Phos  2.2     10-02  Mg     2.0     10-02    TPro  6.2  /  Alb  3.3  /  TBili  1.1  /  DBili  x   /  AST  21  /  ALT  16  /  AlkPhos  75  10-02    PT/INR - ( 01 Oct 2022 08:50 )   PT: 13.5 sec;   INR: 1.16 ratio         PTT - ( 30 Sep 2022 11:38 )  PTT:43.8 sec  LIVER FUNCTIONS - ( 02 Oct 2022 06:51 )  Alb: 3.3 g/dL / Pro: 6.2 g/dL / ALK PHOS: 75 U/L / ALT: 16 U/L / AST: 21 U/L / GGT: x

## 2022-10-02 NOTE — PROGRESS NOTE ADULT - PROBLEM SELECTOR PLAN 3
- Bi monthly transfusions at [facility]. Followed by Dr. Benjy Guo  - Hgb 7.0 baseline high 8's/low 9's based on outpatient labs  - Continue Danazol  - Heme consult  - s/p 2unit PRBC

## 2022-10-02 NOTE — CONSULT NOTE ADULT - ASSESSMENT
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 Briefly, patient known to have gastric varices since 2014 on imaging. s/p liver biopsy w/ elevated portosystemic gradient of 9 with histology demonstrating focal nodular hyperplasia but no fibrosis. Underwent EGD in 2021 with gastric varices (IGV1 and IGV2) not amenable to endoscopic therapy. Patient had recent ERCP done on 9/22/22 for choledocholithiasis s/p metal stent.     #Hx of Choledocholithiasis s/p ERCP on 9/22/22 with metal stent  -Patient with normal Bilirubin demonstrating patent stent. Suspect ongoing abdominal pain biliary colic pain ?gallbladder.     # Nodular regenerative hyperplasia   #noncirrhotic portal HTN   #Gastric varices, size seem stable in ERCP compared to last EGD in 1/2021.     Recommendations:  -Stop Propanolol in favor of Coreg 3.125 mg BID  -Will have Transplant Surgeon evaluate patient for Cholecystectomy and discuss role of further optimization with ?TIPS  -Agree to continue IV abx   -low fat diet    Hepatology will continue to follow     Recommendations preliminary until signed by attending.     Lew Larios MD  Gastroenterology/Hepatology Fellow  1st option: [phone] (text or call), ONLY available from 7:00 am to 5:00 pm.   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 Briefly, patient known to have gastric varices since 2014 on imaging. s/p liver biopsy w/ elevated portosystemic gradient of 9 with histology demonstrating focal nodular hyperplasia but no fibrosis. Underwent EGD in 2021 with gastric varices (IGV1 and IGV2) not amenable to endoscopic therapy. Patient had recent ERCP done on 9/22/22 for choledocholithiasis s/p metal stent.     #Hx of Choledocholithiasis s/p ERCP on 9/22/22 with metal stent  -Patient with normal Bilirubin demonstrating patent stent. Suspect ongoing abdominal pain biliary colic pain ?gallbladder.     # Nodular regenerative hyperplasia   #noncirrhotic portal HTN   #Gastric varices, size seem stable in ERCP compared to last EGD in 1/2021.     Recommendations:  -Stop Propanolol in favor of Coreg 3.125 mg BID  -Will have Transplant Surgeon evaluate patient for Cholecystectomy and discuss role of further optimization  -Obtain MRCP  -Agree to continue IV abx   -low fat diet    Hepatology will continue to follow     Recommendations preliminary until signed by attending.     Lew Larios MD  Gastroenterology/Hepatology Fellow  1st option: [phone] (text or call), ONLY available from 7:00 am to 5:00 pm.   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 Briefly, patient known to have gastric varices since 2014 on imaging. s/p liver biopsy w/ elevated portosystemic gradient of 9 with histology demonstrating nodular regenerative hyperplasia but no fibrosis. Underwent EGD in 2021 with gastric varices (IGV1 and IGV2) not amenable to endoscopic therapy. Patient had recent ERCP done on 9/22/22 for choledocholithiasis s/p metal stent.     #Hx of Choledocholithiasis s/p ERCP on 9/22/22 with metal stent  -Patient with normal Bilirubin demonstrating patent stent. Suspect ongoing abdominal pain biliary colic pain ?gallbladder.     # Nodular regenerative hyperplasia   #noncirrhotic portal HTN   #Gastric varices, size seem stable in ERCP compared to last EGD in 1/2021.     Recommendations:  -Stop Propanolol in favor of Coreg 3.125 mg BID  -Will have Transplant Surgeon evaluate patient for Cholecystectomy and discuss role of further optimization  -Obtain MRCP  -Agree to continue IV abx   -low fat diet    Hepatology will continue to follow     Recommendations preliminary until signed by attending.     Lew Larios MD  Gastroenterology/Hepatology Fellow  1st option: [phone] (text or call), ONLY available from 7:00 am to 5:00 pm.   **Contact on-call GI fellow via answering service ([phone]) from 5pm-7am AND on weekends/holidays**  2nd option: Available via Microsoft Teams  3rd option: Pager: [phone]

## 2022-10-02 NOTE — CONSULT NOTE ADULT - ASSESSMENT
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 Briefly, patient known to have gastric varices since 2014 on imaging. s/p liver biopsy w/ elevated portosystemic gradient of 9 with histology demonstrating nodular regenerative hyperplasia but no fibrosis. Underwent EGD in 2021 with gastric varices (IGV1 and IGV2) not amenable to endoscopic therapy. Patient had recent ERCP done on 9/22/22 for choledocholithiasis s/p metal stent.         Plan:  No indication for emergent surgery at this time  Please check amylase/ lipase   GI to consult s/p recent ERCP w/ stent  Agree w/ MRCP  Will continue to follow   Please page [phone] w/ any questions

## 2022-10-02 NOTE — PROGRESS NOTE ADULT - SUBJECTIVE AND OBJECTIVE BOX
***********************************************  Vini Sawyer MD  Internal Medicine   PGY2  ***********************************************      PROGRESS NOTE:     Patient is a 62y old  Female who presents with a chief complaint of Abdominal pain (01 Oct 2022 11:47)      SUBJECTIVE / OVERNIGHT EVENTS:    OVERNIGHT:       Patient examined at bedside        MEDICATIONS  (STANDING):  calcium acetate 1334 milliGRAM(s) Oral three times a day with meals  danazol 200 milliGRAM(s) Oral three times a day  epoetin filiberto-epbx (RETACRIT) Injectable 11920 Unit(s) IV Push <User Schedule>  multivitamin 1 Tablet(s) Oral daily  oxyCODONE    IR 5 milliGRAM(s) Oral once  piperacillin/tazobactam IVPB.. 3.375 Gram(s) IV Intermittent every 12 hours  propranolol 10 milliGRAM(s) Oral daily  ruxolitinib 10 milliGRAM(s) Oral <User Schedule>    MEDICATIONS  (PRN):  aluminum hydroxide/magnesium hydroxide/simethicone Suspension 30 milliLiter(s) Oral every 4 hours PRN Dyspepsia      CAPILLARY BLOOD GLUCOSE        I&O's Summary    30 Sep 2022 07:01  -  01 Oct 2022 07:00  --------------------------------------------------------  IN: 0 mL / OUT: 500 mL / NET: -500 mL        PHYSICAL EXAM:  Vital Signs Last 24 Hrs  T(C): 36.4 (02 Oct 2022 05:22), Max: 36.8 (01 Oct 2022 20:36)  T(F): 97.6 (02 Oct 2022 05:22), Max: 98.2 (01 Oct 2022 20:36)  HR: 66 (02 Oct 2022 05:22) (61 - 66)  BP: 116/70 (02 Oct 2022 05:22) (116/70 - 127/74)  BP(mean): --  RR: 18 (02 Oct 2022 05:22) (17 - 18)  SpO2: 96% (02 Oct 2022 05:22) (96% - 100%)    Parameters below as of 02 Oct 2022 05:22  Patient On (Oxygen Delivery Method): room air        CONSTITUTIONAL: NAD; well-developed  HEENT: PERRL, clear conjunctiva  RESPIRATORY: Normal respiratory effort; lungs are clear to auscultation bilaterally; No Crackles/Rhonchi/Wheezing  CARDIOVASCULAR: Regular rate and rhythm, normal S1 and S2, no murmur/rub/gallop; No lower extremity edema; Peripheral pulses are 2+ bilaterally  ABDOMEN: Nontender to palpation, normoactive bowel sounds, no rebound/guarding; No hepatosplenomegaly  MUSCULOSKELETAL: no clubbing or cyanosis of digits; no joint swelling or tenderness to palpation  EXTREMITY: Lower extremities Non-tender to palpation; non-erythematous B/L  NEURO: A&Ox3; no focal deficits   PSYCH: normal mood; Affect appropirate    LABS:                        9.4    5.73  )-----------( 122      ( 01 Oct 2022 08:50 )             28.9     10-01    135  |  96  |  11  ----------------------------<  107<H>  3.8   |  26  |  3.89<H>    Ca    8.3<L>      01 Oct 2022 08:50    TPro  7.0  /  Alb  3.6  /  TBili  1.5<H>  /  DBili  x   /  AST  26  /  ALT  18  /  AlkPhos  83  10-01    PT/INR - ( 01 Oct 2022 08:50 )   PT: 13.5 sec;   INR: 1.16 ratio         PTT - ( 30 Sep 2022 11:38 )  PTT:43.8 sec          Culture - Blood (collected 30 Sep 2022 11:15)  Source: .Blood Blood-Peripheral  Preliminary Report (01 Oct 2022 18:02):    No growth to date.    Culture - Blood (collected 30 Sep 2022 11:05)  Source: .Blood Blood-Peripheral  Preliminary Report (01 Oct 2022 18:02):    No growth to date.        RADIOLOGY & ADDITIONAL TESTS:  Results Reviewed:   Imaging Personally Reviewed:  Electrocardiogram Personally Reviewed:    COORDINATION OF CARE:  Care Discussed with Consultants/Other Providers [Y/N]:  Prior or Outpatient Records Reviewed [Y/N]:   ***********************************************  Vini Sawyer MD  Internal Medicine   PGY2  ***********************************************      PROGRESS NOTE:     Patient is a 62y old  Female who presents with a chief complaint of Abdominal pain (01 Oct 2022 11:47)      SUBJECTIVE / OVERNIGHT EVENTS:    OVERNIGHT:   - Tylenol  - Oxy     Patient examined at bedside complaining of RUQ ABD pain. She says the tylenol does not help. She is going to receive Oxy, patient amenable to trying. Otherwise no other acute issues. Had 1 episode of diarrhe yesterday.         MEDICATIONS  (STANDING):  calcium acetate 1334 milliGRAM(s) Oral three times a day with meals  danazol 200 milliGRAM(s) Oral three times a day  epoetin filiberto-epbx (RETACRIT) Injectable 01221 Unit(s) IV Push <User Schedule>  multivitamin 1 Tablet(s) Oral daily  oxyCODONE    IR 5 milliGRAM(s) Oral once  piperacillin/tazobactam IVPB.. 3.375 Gram(s) IV Intermittent every 12 hours  propranolol 10 milliGRAM(s) Oral daily  ruxolitinib 10 milliGRAM(s) Oral <User Schedule>    MEDICATIONS  (PRN):  aluminum hydroxide/magnesium hydroxide/simethicone Suspension 30 milliLiter(s) Oral every 4 hours PRN Dyspepsia      CAPILLARY BLOOD GLUCOSE        I&O's Summary    30 Sep 2022 07:01  -  01 Oct 2022 07:00  --------------------------------------------------------  IN: 0 mL / OUT: 500 mL / NET: -500 mL        PHYSICAL EXAM:  Vital Signs Last 24 Hrs  T(C): 36.4 (02 Oct 2022 05:22), Max: 36.8 (01 Oct 2022 20:36)  T(F): 97.6 (02 Oct 2022 05:22), Max: 98.2 (01 Oct 2022 20:36)  HR: 66 (02 Oct 2022 05:22) (61 - 66)  BP: 116/70 (02 Oct 2022 05:22) (116/70 - 127/74)  BP(mean): --  RR: 18 (02 Oct 2022 05:22) (17 - 18)  SpO2: 96% (02 Oct 2022 05:22) (96% - 100%)    Parameters below as of 02 Oct 2022 05:22  Patient On (Oxygen Delivery Method): room air          GENERAL: NAD  EYES: sclera clear  ENT: Moist mucous membranes  NECK: Supple, No JVD  CHEST/LUNG: Clear to auscultation bilaterally; Unlabored respirations  HEART: Regular rate and rhythm; No murmurs. Chest tender to palpation  Peripheral edema: none  ABDOMEN: RUQ and epigastric tenderness with hightower sign.   EXTREMITIES:  2+ radial pulses, 2+ dorsalis pedis. AVF patent.  NERVOUS SYSTEM:  A&Ox3, no gross lateralizing deficits   SKIN: No rashes or lesions    LABS:                        9.4    5.73  )-----------( 122      ( 01 Oct 2022 08:50 )             28.9     10-01    135  |  96  |  11  ----------------------------<  107<H>  3.8   |  26  |  3.89<H>    Ca    8.3<L>      01 Oct 2022 08:50    TPro  7.0  /  Alb  3.6  /  TBili  1.5<H>  /  DBili  x   /  AST  26  /  ALT  18  /  AlkPhos  83  10-01    PT/INR - ( 01 Oct 2022 08:50 )   PT: 13.5 sec;   INR: 1.16 ratio         PTT - ( 30 Sep 2022 11:38 )  PTT:43.8 sec          Culture - Blood (collected 30 Sep 2022 11:15)  Source: .Blood Blood-Peripheral  Preliminary Report (01 Oct 2022 18:02):    No growth to date.    Culture - Blood (collected 30 Sep 2022 11:05)  Source: .Blood Blood-Peripheral  Preliminary Report (01 Oct 2022 18:02):    No growth to date.        RADIOLOGY & ADDITIONAL TESTS:  Results Reviewed:   Imaging Personally Reviewed:  Electrocardiogram Personally Reviewed:    COORDINATION OF CARE:  Care Discussed with Consultants/Other Providers [Y/N]:  Prior or Outpatient Records Reviewed [Y/N]:

## 2022-10-02 NOTE — PROGRESS NOTE ADULT - PROBLEM SELECTOR PLAN 1
- Last admission pt with cholelithiasis and choledocholithiasis. Had ERCP with stone removal c/b post procedure pancreatitis that delayed surgery. Cholelithiasis and a focus of antidependent air seen on CT. Gallbladder wall thickening/edema on CT from 9/26.   - Etiology of abdominal pain likely cholecystitis. Bilirubin slightly elevated so less concern for obstruction.  - GI emailed. F/u surgery  - Patient without elevated WBC but has myelofibrosis. Febrile. Blood cultures sent. Empiric zosyn 9/30-->  - Lactate 0.5 no concern for mesenteric ischemia  - Low fat diet .    Pending NM Hepatobilliary study, pending GI Reccs  Pain control

## 2022-10-03 DIAGNOSIS — E83.39 OTHER DISORDERS OF PHOSPHORUS METABOLISM: ICD-10-CM

## 2022-10-03 DIAGNOSIS — N18.9 CHRONIC KIDNEY DISEASE, UNSPECIFIED: ICD-10-CM

## 2022-10-03 LAB
ALBUMIN SERPL ELPH-MCNC: 3.3 G/DL — SIGNIFICANT CHANGE UP (ref 3.3–5)
ALP SERPL-CCNC: 80 U/L — SIGNIFICANT CHANGE UP (ref 40–120)
ALT FLD-CCNC: 15 U/L — SIGNIFICANT CHANGE UP (ref 10–45)
ANION GAP SERPL CALC-SCNC: 17 MMOL/L — SIGNIFICANT CHANGE UP (ref 5–17)
AST SERPL-CCNC: 20 U/L — SIGNIFICANT CHANGE UP (ref 10–40)
BASOPHILS # BLD AUTO: 0.12 K/UL — SIGNIFICANT CHANGE UP (ref 0–0.2)
BASOPHILS NFR BLD AUTO: 1.6 % — SIGNIFICANT CHANGE UP (ref 0–2)
BILIRUB SERPL-MCNC: 1.1 MG/DL — SIGNIFICANT CHANGE UP (ref 0.2–1.2)
BUN SERPL-MCNC: 25 MG/DL — HIGH (ref 7–23)
CALCIUM SERPL-MCNC: 8.3 MG/DL — LOW (ref 8.4–10.5)
CHLORIDE SERPL-SCNC: 97 MMOL/L — SIGNIFICANT CHANGE UP (ref 96–108)
CO2 SERPL-SCNC: 20 MMOL/L — LOW (ref 22–31)
CREAT SERPL-MCNC: 6.74 MG/DL — HIGH (ref 0.5–1.3)
EGFR: 6 ML/MIN/1.73M2 — LOW
EOSINOPHIL # BLD AUTO: 0.18 K/UL — SIGNIFICANT CHANGE UP (ref 0–0.5)
EOSINOPHIL NFR BLD AUTO: 2.5 % — SIGNIFICANT CHANGE UP (ref 0–6)
GLUCOSE SERPL-MCNC: 100 MG/DL — HIGH (ref 70–99)
HCT VFR BLD CALC: 31.5 % — LOW (ref 34.5–45)
HGB BLD-MCNC: 9.8 G/DL — LOW (ref 11.5–15.5)
IMM GRANULOCYTES NFR BLD AUTO: 9.3 % — HIGH (ref 0–0.9)
LYMPHOCYTES # BLD AUTO: 0.88 K/UL — LOW (ref 1–3.3)
LYMPHOCYTES # BLD AUTO: 12 % — LOW (ref 13–44)
MAGNESIUM SERPL-MCNC: 2.3 MG/DL — SIGNIFICANT CHANGE UP (ref 1.6–2.6)
MCHC RBC-ENTMCNC: 29.7 PG — SIGNIFICANT CHANGE UP (ref 27–34)
MCHC RBC-ENTMCNC: 31.1 GM/DL — LOW (ref 32–36)
MCV RBC AUTO: 95.5 FL — SIGNIFICANT CHANGE UP (ref 80–100)
MONOCYTES # BLD AUTO: 0.43 K/UL — SIGNIFICANT CHANGE UP (ref 0–0.9)
MONOCYTES NFR BLD AUTO: 5.9 % — SIGNIFICANT CHANGE UP (ref 2–14)
MRSA PCR RESULT.: SIGNIFICANT CHANGE UP
NEUTROPHILS # BLD AUTO: 5.03 K/UL — SIGNIFICANT CHANGE UP (ref 1.8–7.4)
NEUTROPHILS NFR BLD AUTO: 68.7 % — SIGNIFICANT CHANGE UP (ref 43–77)
NRBC # BLD: 2 /100 WBCS — HIGH (ref 0–0)
PHOSPHATE SERPL-MCNC: 2.3 MG/DL — LOW (ref 2.5–4.5)
PLATELET # BLD AUTO: 125 K/UL — LOW (ref 150–400)
POTASSIUM SERPL-MCNC: 4.7 MMOL/L — SIGNIFICANT CHANGE UP (ref 3.5–5.3)
POTASSIUM SERPL-SCNC: 4.7 MMOL/L — SIGNIFICANT CHANGE UP (ref 3.5–5.3)
PROT SERPL-MCNC: 6.7 G/DL — SIGNIFICANT CHANGE UP (ref 6–8.3)
RBC # BLD: 3.3 M/UL — LOW (ref 3.8–5.2)
RBC # FLD: 19.4 % — HIGH (ref 10.3–14.5)
S AUREUS DNA NOSE QL NAA+PROBE: SIGNIFICANT CHANGE UP
SODIUM SERPL-SCNC: 134 MMOL/L — LOW (ref 135–145)
WBC # BLD: 7.32 K/UL — SIGNIFICANT CHANGE UP (ref 3.8–10.5)
WBC # FLD AUTO: 7.32 K/UL — SIGNIFICANT CHANGE UP (ref 3.8–10.5)

## 2022-10-03 PROCEDURE — 78227 HEPATOBIL SYST IMAGE W/DRUG: CPT | Mod: 26

## 2022-10-03 PROCEDURE — 99233 SBSQ HOSP IP/OBS HIGH 50: CPT | Mod: GC

## 2022-10-03 PROCEDURE — 93975 VASCULAR STUDY: CPT | Mod: 26

## 2022-10-03 RX ORDER — OXYCODONE HYDROCHLORIDE 5 MG/1
5 TABLET ORAL ONCE
Refills: 0 | Status: DISCONTINUED | OUTPATIENT
Start: 2022-10-03 | End: 2022-10-03

## 2022-10-03 RX ADMIN — CHLORHEXIDINE GLUCONATE 1 APPLICATION(S): 213 SOLUTION TOPICAL at 12:19

## 2022-10-03 RX ADMIN — DANAZOL 200 MILLIGRAM(S): 200 CAPSULE ORAL at 16:35

## 2022-10-03 RX ADMIN — Medication 1334 MILLIGRAM(S): at 12:10

## 2022-10-03 RX ADMIN — CARVEDILOL PHOSPHATE 3.12 MILLIGRAM(S): 80 CAPSULE, EXTENDED RELEASE ORAL at 05:17

## 2022-10-03 RX ADMIN — OXYCODONE HYDROCHLORIDE 5 MILLIGRAM(S): 5 TABLET ORAL at 02:11

## 2022-10-03 RX ADMIN — Medication 1334 MILLIGRAM(S): at 17:47

## 2022-10-03 RX ADMIN — PIPERACILLIN AND TAZOBACTAM 25 GRAM(S): 4; .5 INJECTION, POWDER, LYOPHILIZED, FOR SOLUTION INTRAVENOUS at 12:20

## 2022-10-03 RX ADMIN — ERYTHROPOIETIN 20000 UNIT(S): 10000 INJECTION, SOLUTION INTRAVENOUS; SUBCUTANEOUS at 21:06

## 2022-10-03 RX ADMIN — Medication 1 TABLET(S): at 12:10

## 2022-10-03 RX ADMIN — OXYCODONE HYDROCHLORIDE 5 MILLIGRAM(S): 5 TABLET ORAL at 02:41

## 2022-10-03 RX ADMIN — PIPERACILLIN AND TAZOBACTAM 25 GRAM(S): 4; .5 INJECTION, POWDER, LYOPHILIZED, FOR SOLUTION INTRAVENOUS at 00:20

## 2022-10-03 RX ADMIN — DANAZOL 200 MILLIGRAM(S): 200 CAPSULE ORAL at 05:17

## 2022-10-03 NOTE — PROGRESS NOTE ADULT - PROBLEM SELECTOR PLAN 1
- Last admission pt with cholelithiasis and choledocholithiasis. Had ERCP with stone removal c/b post procedure pancreatitis that delayed surgery. Cholelithiasis and a focus of antidependent air seen on CT. Gallbladder wall thickening/edema on CT from 9/26.   - Etiology of abdominal pain likely cholecystitis. Bilirubin slightly elevated so less concern for obstruction.  - GI emailed. F/u surgery  - Patient without elevated WBC but has myelofibrosis. Febrile. Blood cultures sent. Empiric zosyn 9/30-->  - Lactate 0.5 no concern for mesenteric ischemia  - Low fat diet .    Pending NM Hepatobilliary study, pending GI Reccs  Pain control - Last admission pt with cholelithiasis and choledocholithiasis. Had ERCP with stone removal c/b post procedure pancreatitis that delayed surgery. Cholelithiasis and a focus of antidependent air seen on CT. Gallbladder wall thickening/edema on CT from 9/26.   - HIDA scan positive for cholecystitis --> transplant and general surgery notified.   - Lipase also elevated at 257.  - Patient without elevated WBC but has myelofibrosis. Febrile. Blood cultures sent. Empiric zosyn 9/30-->10/4  - Lactate 0.5 no concern for mesenteric ischemia  - Low fat diet.  - Pending MRCP with contrast. Nephro aware

## 2022-10-03 NOTE — DIETITIAN INITIAL EVALUATION ADULT - OTHER INFO
Reports UBW 56 kg. States she has gained weight over the past 2 years, was 52 kg.  Dosing wt: 59 kg (09-30, stated)  No additional weights noted per chart. RD to continue to monitor weight trends as able/available.     Per chart, pt currently ordered for zosyn, PhosLo and a multivitamin in-house.

## 2022-10-03 NOTE — PROGRESS NOTE ADULT - SUBJECTIVE AND OBJECTIVE BOX
Interval Events:   ongoing mild abd discomfort w/ N. Tolerating PO 50%.     Hospital Medications:  aluminum hydroxide/magnesium hydroxide/simethicone Suspension 30 milliLiter(s) Oral every 4 hours PRN  calcium acetate 1334 milliGRAM(s) Oral three times a day with meals  carvedilol 3.125 milliGRAM(s) Oral every 12 hours  chlorhexidine 2% Cloths 1 Application(s) Topical daily  danazol 200 milliGRAM(s) Oral three times a day  epoetin filiberto-epbx (RETACRIT) Injectable 65011 Unit(s) IV Push <User Schedule>  multivitamin 1 Tablet(s) Oral daily  piperacillin/tazobactam IVPB.. 3.375 Gram(s) IV Intermittent every 12 hours  ruxolitinib 10 milliGRAM(s) Oral <User Schedule>      ROS: All system reviewed and negative except as mentioned above.    PHYSICAL EXAM:   Vital Signs:  Vital Signs Last 24 Hrs  T(C): 36.8 (03 Oct 2022 04:38), Max: 37.1 (02 Oct 2022 11:33)  T(F): 98.2 (03 Oct 2022 04:38), Max: 98.7 (02 Oct 2022 11:33)  HR: 65 (03 Oct 2022 04:38) (64 - 65)  BP: 123/64 (03 Oct 2022 04:38) (117/65 - 126/77)  BP(mean): --  RR: 18 (03 Oct 2022 04:38) (18 - 18)  SpO2: 98% (03 Oct 2022 04:38) (96% - 100%)    Parameters below as of 03 Oct 2022 04:38  Patient On (Oxygen Delivery Method): room air      Daily     Daily     GENERAL:  NAD, Appears stated age  HEENT:  NC/AT,  conjunctivae clear and pink, sclera -anicteric  CHEST:  Normal Effort, Breath sounds clear  HEART:  RRR, S1 + S2, no murmurs  ABDOMEN:  Soft, mild discomfort   EXTREMITIES:  no cyanosis or edema  SKIN:  Warm & Dry. No rash or erythema  NEURO:  Alert, oriented, no focal deficit    LABS:                        9.8    7.32  )-----------( 125      ( 03 Oct 2022 06:28 )             31.5     Mean Cell Volume: 95.5 fl (10-03-22 @ 06:28)    10-03    134<L>  |  97  |  25<H>  ----------------------------<  100<H>  4.7   |  20<L>  |  6.74<H>    Ca    8.3<L>      03 Oct 2022 06:28  Phos  2.3     10-03  Mg     2.3     10-03    TPro  6.7  /  Alb  3.3  /  TBili  1.1  /  DBili  x   /  AST  20  /  ALT  15  /  AlkPhos  80  10-03    LIVER FUNCTIONS - ( 03 Oct 2022 06:28 )  Alb: 3.3 g/dL / Pro: 6.7 g/dL / ALK PHOS: 80 U/L / ALT: 15 U/L / AST: 20 U/L / GGT: x           PT/INR - ( 01 Oct 2022 08:50 )   PT: 13.5 sec;   INR: 1.16 ratio             Amylase Serum83      Lipase [id number]       Ammonia--                          9.8    7.32  )-----------( 125      ( 03 Oct 2022 06:28 )             31.5                         8.7    5.03  )-----------( 103      ( 02 Oct 2022 06:53 )             27.4                         9.4    5.73  )-----------( 122      ( 01 Oct 2022 08:50 )             28.9                         7.8    4.08  )-----------( 117      ( 01 Oct 2022 00:10 )             25.2                         7.0    4.97  )-----------( 114      ( 30 Sep 2022 11:38 )             22.9       Imaging: Images reviewed.

## 2022-10-03 NOTE — DIETITIAN INITIAL EVALUATION ADULT - PERTINENT LABORATORY DATA
10-03    134<L>  |  97  |  25<H>  ----------------------------<  100<H>  4.7   |  20<L>  |  6.74<H>    Ca    8.3<L>      03 Oct 2022 06:28  Phos  2.3     10-03  Mg     2.3     10-03    TPro  6.7  /  Alb  3.3  /  TBili  1.1  /  DBili  x   /  AST  20  /  ALT  15  /  AlkPhos  80  10-03

## 2022-10-03 NOTE — PROGRESS NOTE ADULT - ASSESSMENT
61 yo F hx of myelofibrosis, polycythemia vera, ESRD on [facility] schedule via LUE AVF (last dialysis W), portal HTN, subclavian port for transfusions, gastric varices, gallstones, recent ERCP for choledocolithiasis s/p biliary stent, c/b post ERCP pancreatitis now presenting with fever and RUQ and epigastric pain, and fever

## 2022-10-03 NOTE — PROGRESS NOTE ADULT - PROBLEM SELECTOR PLAN 3
C/w home phos binder  Obtain calcium and phosphorus with every BMP    If you have any questions, please feel free to contact me  Keshawn Farfan  Nephrology Fellow  [phone]; Prefer [employer] TEAMS  (After 5pm or on weekends please page the on-call fellow).    Patient was discussed with Dr. Fajardo who agrees with my A/P. Addendum to follow

## 2022-10-03 NOTE — PROGRESS NOTE ADULT - ATTENDING COMMENTS
63 yo F hx of myelofibrosis, polycythemia vera, ESRD on [facility] schedule via LUE AVF (last dialysis W), portal HTN, subclavian port for transfusions, gastric varices, gallstones, recent ERCP for choledocholithiasis s/p biliary stent, c/b post ERCP pancreatitis + pneumobilia now presenting with fever and RUQ and epigastric pain.    #RUQ and epigastric abdominal pain  #?pancreatitis  #ESRD  #gastric varices/portal HTN  #myelofibrosis  #PCV  #hx of ERCP pancreatitis  - patient was seen and examined at bedside, hx of ERCP induced pancreatitis  - post prandial abdominal pain started 2 weeks ago, now improved, on pain control  - US abdomen: gastric and splenic varices   - CTAP showing normal liver contour, splenomegaly, and pneumobilia(previously seen on imaging)  - ? stent occlusion, retained stone, sphincter of Oddi dysfunction, pending HIDA scan and MRCP  - on Zosyn empirically, blood culture negative to date  - GI and Heme/Onc Recs appreciated   - nephrology on board for HD  - holding fluids for pancreatitis given ESRD, encouraged PO hydration

## 2022-10-03 NOTE — PROGRESS NOTE ADULT - PROBLEM SELECTOR PLAN 3
- Bi monthly transfusions at [facility]. Followed by Dr. Benjy Guo  - Hgb 7.0 baseline high 8's/low 9's based on outpatient labs  - Continue Danazol  - Heme consult  - s/p 2unit PRBC - Bi monthly transfusions at [facility]. Followed by Dr. Benjy Guo  - US doppler abdomen no portal or splenic vein thrombosis.  - Liver biopsy with nodular regenerative hyperplasia  - Hgb 7.0 baseline high 8's/low 9's based on outpatient labs  - Continue Danazol  - Heme consult  - s/p 2unit PRBC

## 2022-10-03 NOTE — DIETITIAN INITIAL EVALUATION ADULT - ORAL INTAKE PTA/DIET HISTORY
Pt reports good appetite/PO intake PTA but reports abdominal pain that occurs 2 hours after eating over the past 2 months. Is aware of and follows renal diet for HD. Confirms NKFA.

## 2022-10-03 NOTE — PROGRESS NOTE ADULT - SUBJECTIVE AND OBJECTIVE BOX
PROGRESS NOTE:   Authored by: Atul Conroy M.D.   Internal Medicine PGY-1  Please Contact Via Teams    Patient is a 62y old  Female who presents with a chief complaint of Abdominal pain (02 Oct 2022 17:36)      SUBJECTIVE / OVERNIGHT EVENTS:  No acute events overnight.     ADDITIONAL REVIEW OF SYSTEMS:  Patient denies fevers, chills, chest pain, shortness of breath, nausea, abdominal pain, diarrhea, constipation, dysuria, leg swelling, headache, light headedness.    MEDICATIONS  (STANDING):  calcium acetate 1334 milliGRAM(s) Oral three times a day with meals  carvedilol 3.125 milliGRAM(s) Oral every 12 hours  chlorhexidine 2% Cloths 1 Application(s) Topical daily  danazol 200 milliGRAM(s) Oral three times a day  epoetin filiberto-epbx (RETACRIT) Injectable 31624 Unit(s) IV Push <User Schedule>  multivitamin 1 Tablet(s) Oral daily  piperacillin/tazobactam IVPB.. 3.375 Gram(s) IV Intermittent every 12 hours  ruxolitinib 10 milliGRAM(s) Oral <User Schedule>    MEDICATIONS  (PRN):  aluminum hydroxide/magnesium hydroxide/simethicone Suspension 30 milliLiter(s) Oral every 4 hours PRN Dyspepsia      CAPILLARY BLOOD GLUCOSE        I&O's Summary    02 Oct 2022 07:01  -  03 Oct 2022 07:00  --------------------------------------------------------  IN: 480 mL / OUT: 0 mL / NET: 480 mL        PHYSICAL EXAM:  Vital Signs Last 24 Hrs  T(C): 36.8 (03 Oct 2022 04:38), Max: 37.1 (02 Oct 2022 11:33)  T(F): 98.2 (03 Oct 2022 04:38), Max: 98.7 (02 Oct 2022 11:33)  HR: 65 (03 Oct 2022 04:38) (64 - 65)  BP: 123/64 (03 Oct 2022 04:38) (117/65 - 126/77)  BP(mean): --  RR: 18 (03 Oct 2022 04:38) (18 - 18)  SpO2: 98% (03 Oct 2022 04:38) (96% - 100%)    Parameters below as of 03 Oct 2022 04:38  Patient On (Oxygen Delivery Method): room air        CONSTITUTIONAL: NAD, well-developed  RESPIRATORY: Normal respiratory effort; lungs are clear to auscultation bilaterally  CARDIOVASCULAR: Regular rate and rhythm, normal S1 and S2, no murmur/rub/gallop; No lower extremity edema; Peripheral pulses are 2+ bilaterally  ABDOMEN: Nontender to palpation, normoactive bowel sounds, no rebound/guarding; No hepatosplenomegaly  MUSCLOSKELETAL: no clubbing or cyanosis of digits; no joint swelling or tenderness to palpation  PSYCH: A+O to person, place, and time; affect appropriate    LABS:                        9.8    7.32  )-----------( 125      ( 03 Oct 2022 06:28 )             31.5     10-02    134<L>  |  97  |  19  ----------------------------<  147<H>  3.8   |  24  |  5.40<H>    Ca    7.8<L>      02 Oct 2022 06:51  Phos  2.2     10-02  Mg     2.0     10-02    TPro  6.2  /  Alb  3.3  /  TBili  1.1  /  DBili  x   /  AST  21  /  ALT  16  /  AlkPhos  75  10-02    PT/INR - ( 01 Oct 2022 08:50 )   PT: 13.5 sec;   INR: 1.16 ratio                   Culture - Blood (collected 30 Sep 2022 11:15)  Source: .Blood Blood-Peripheral  Preliminary Report (01 Oct 2022 18:02):    No growth to date.    Culture - Blood (collected 30 Sep 2022 11:05)  Source: .Blood Blood-Peripheral  Preliminary Report (01 Oct 2022 18:02):    No growth to date.        Tele Reviewed:    RADIOLOGY & ADDITIONAL TESTS:  Results Reviewed:   Imaging Personally Reviewed:  Electrocardiogram Personally Reviewed:     PROGRESS NOTE:   Authored by: Atul Conroy M.D.   Internal Medicine PGY-1  Please Contact Via Teams    Patient is a 62y old  Female who presents with a chief complaint of Abdominal pain (02 Oct 2022 17:36)      SUBJECTIVE / OVERNIGHT EVENTS:  No acute events overnight. Is feeling okay this morning, still with the postprandial abdominal pain but without shortness of breath. Her pain has not changed since admission, but improves with oxy.    MEDICATIONS  (STANDING):  calcium acetate 1334 milliGRAM(s) Oral three times a day with meals  carvedilol 3.125 milliGRAM(s) Oral every 12 hours  chlorhexidine 2% Cloths 1 Application(s) Topical daily  danazol 200 milliGRAM(s) Oral three times a day  epoetin filiberto-epbx (RETACRIT) Injectable 25478 Unit(s) IV Push <User Schedule>  multivitamin 1 Tablet(s) Oral daily  piperacillin/tazobactam IVPB.. 3.375 Gram(s) IV Intermittent every 12 hours  ruxolitinib 10 milliGRAM(s) Oral <User Schedule>    MEDICATIONS  (PRN):  aluminum hydroxide/magnesium hydroxide/simethicone Suspension 30 milliLiter(s) Oral every 4 hours PRN Dyspepsia      CAPILLARY BLOOD GLUCOSE        I&O's Summary    02 Oct 2022 07:01  -  03 Oct 2022 07:00  --------------------------------------------------------  IN: 480 mL / OUT: 0 mL / NET: 480 mL        PHYSICAL EXAM:  Vital Signs Last 24 Hrs  T(C): 36.8 (03 Oct 2022 04:38), Max: 37.1 (02 Oct 2022 11:33)  T(F): 98.2 (03 Oct 2022 04:38), Max: 98.7 (02 Oct 2022 11:33)  HR: 65 (03 Oct 2022 04:38) (64 - 65)  BP: 123/64 (03 Oct 2022 04:38) (117/65 - 126/77)  BP(mean): --  RR: 18 (03 Oct 2022 04:38) (18 - 18)  SpO2: 98% (03 Oct 2022 04:38) (96% - 100%)    Parameters below as of 03 Oct 2022 04:38  Patient On (Oxygen Delivery Method): room air      GENERAL: NAD  HEAD:  Atraumatic, Normocephalic  EYES: EOMI, PERRLA, conjunctiva and sclera clear  ENT: Moist mucous membranes  NECK: Supple, No JVD  CHEST/LUNG: Clear to auscultation bilaterally; Unlabored respirations  HEART: Regular rate and rhythm; No murmurs. Chest tender to palpation  Peripheral edema: none  ABDOMEN: RUQ and epigastric tenderness with hightower sign. Also has right flank tenderness which she says she has had since she was in her 20's.  EXTREMITIES:  2+ radial pulses, 2+ dorsalis pedis. AVF patent.  NERVOUS SYSTEM:  A&Ox3, no gross lateralizing deficits   SKIN: No rashes or lesions    LABS:                        9.8    7.32  )-----------( 125      ( 03 Oct 2022 06:28 )             31.5     10-02    134<L>  |  97  |  19  ----------------------------<  147<H>  3.8   |  24  |  5.40<H>    Ca    7.8<L>      02 Oct 2022 06:51  Phos  2.2     10-02  Mg     2.0     10-02    TPro  6.2  /  Alb  3.3  /  TBili  1.1  /  DBili  x   /  AST  21  /  ALT  16  /  AlkPhos  75  10-02    PT/INR - ( 01 Oct 2022 08:50 )   PT: 13.5 sec;   INR: 1.16 ratio                   Culture - Blood (collected 30 Sep 2022 11:15)  Source: .Blood Blood-Peripheral  Preliminary Report (01 Oct 2022 18:02):    No growth to date.    Culture - Blood (collected 30 Sep 2022 11:05)  Source: .Blood Blood-Peripheral  Preliminary Report (01 Oct 2022 18:02):    No growth to date.        No new micro    Imaging:    < from: US Abdomen Doppler (10.03.22 @ 11:03) >  FINDINGS:    Splenic Vein: The splenic vein is patent with hepatopetal flow.    Portal Veins: Main portal vein measures 1.4 cm in caliber. The main   portal vein is patent with overall phasic hepatopetal flow, however there   is intermittent reversal of flow during respiration. The main portal vein   measures 1.4 cm and the velocity is 38 cm/s.  The right portal vein and branches are patent and demonstrate phasic   hepatopetal flow.  The left portal vein and branches are patent and demonstrates phasic   hepatopetal flow.    Hepatic Artery: Hepatic artery peak systolic velocity is 61 cm/s. Normal   Doppler ultrasound.    Hepatic Veins: The right, mid and left hepatic veins are patent and   demonstrate phasic flow. The intrahepatic IVC is patent with phasic flow.    Gastric and splenic varices are present.    Additional abdominal findings:  Common bile duct with stent measuring up to 6 mm.  Gallbladder filled with sludge/small stones.  Trace right pleural effusion.  Splenomegaly, measuring 21 cm.      IMPRESSION:    Intermittent reversalof flow in the main portal vein during respiration,   however overall phasic hepatopedal flow. The right and left portal veins   and their branches are patent and demonstrate normal phasic hepatopedal   flow. The splenic vein is patent with normal hepatopedal flow. No portal   or splenic venous thrombosis.    Gastric and splenic varices are present.    < end of copied text >          < from: NM Hepatobiliary Imaging w/ RX (10.03.22 @ 15:11) >  FINDINGS: There is prompt, homogeneous uptake of radiotracer by the   hepatocytes. Activity is first seen in the bowel at 15 minutes. The   gallbladder is not visualized at any time during the study, despite   administration of morphine. There is good clearance of activity from the   liver at the end of the study.    IMPRESSION: Abnormal morphine-augmented hepatobiliary scan.    Findings consistent with acute cholecystitis.    < end of copied text >

## 2022-10-03 NOTE — DIETITIAN INITIAL EVALUATION ADULT - NSFNSGIIOFT_GEN_A_CORE
Reports she gets nausea and gagging the mornings after she gets dialysis for the past 2 years. Denies vomiting, constipation, diarrhea. Reports last BM 10/3. Pt not currently on bowel regimen.

## 2022-10-03 NOTE — PROGRESS NOTE ADULT - SUBJECTIVE AND OBJECTIVE BOX
[facility] Division of Kidney Diseases & Hypertension  FOLLOW UP NOTE  [phone]--------------------------------------------------------------------------------  Chief Complaint:Abdominal pain        24 hour events/subjective:  Plan for HD today. Still complains of abdominal pain      PAST HISTORY  --------------------------------------------------------------------------------  No significant changes to PMH, PSH, FHx, SHx, unless otherwise noted    ALLERGIES & MEDICATIONS  --------------------------------------------------------------------------------  Allergies    No Known Allergies    Intolerances      Standing Inpatient Medications  calcium acetate 1334 milliGRAM(s) Oral three times a day with meals  carvedilol 3.125 milliGRAM(s) Oral every 12 hours  chlorhexidine 2% Cloths 1 Application(s) Topical daily  danazol 200 milliGRAM(s) Oral three times a day  epoetin filiberto-epbx (RETACRIT) Injectable 61193 Unit(s) IV Push <User Schedule>  multivitamin 1 Tablet(s) Oral daily  piperacillin/tazobactam IVPB.. 3.375 Gram(s) IV Intermittent every 12 hours  ruxolitinib 10 milliGRAM(s) Oral <User Schedule>    PRN Inpatient Medications  aluminum hydroxide/magnesium hydroxide/simethicone Suspension 30 milliLiter(s) Oral every 4 hours PRN      REVIEW OF SYSTEMS  --------------------------------------------------------------------------------  Gen: No  fevers/chills  Skin: No rashes  Head/Eyes/Ears/Mouth: No headache; Normal hearing; Normal vision w/o blurriness  Respiratory: No dyspnea, cough, wheezing, hemoptysis  CV: No chest pain, PND, orthopnea  GI: + abdominal pain,- diarrhea, constipation, nausea, vomiting  : No increased frequency, dysuria, hematuria, nocturia  MSK: No joint pain/swelling; no back pain; no edema  Neuro: No dizziness/lightheadedness, weakness, seizures, numbness, tingling      All other systems were reviewed and are negative, except as noted.    VITALS/PHYSICAL EXAM  --------------------------------------------------------------------------------  T(C): 36.8 (10-03-22 @ 04:38), Max: 37.1 (10-02-22 @ 11:33)  HR: 65 (10-03-22 @ 04:38) (64 - 65)  BP: 123/64 (10-03-22 @ 04:38) (117/65 - 126/77)  RR: 18 (10-03-22 @ 04:38) (18 - 18)  SpO2: 98% (10-03-22 @ 04:38) (96% - 100%)  Wt(kg): --        10-02-22 @ 07:01  -  10-03-22 @ 07:00  --------------------------------------------------------  IN: 480 mL / OUT: 0 mL / NET: 480 mL    10-03-22 @ 07:01  -  10-03-22 @ 11:12  --------------------------------------------------------  IN: 0 mL / OUT: 0 mL / NET: 0 mL      Physical Exam:  Gen:  NAD, uncomfortable   	HEENT:  supple neck, clear oropharynx  	Pulm: CTA B/L  	CV: RRR, S1S2; no rub  	Back: No spinal or CVA tenderness; no sacral edema  	Abd: +BS, soft, epigastric tenderness   	: No suprapubic tenderness  	UE: Warm, FROM, no clubbing, intact strength; no edema; no asterixis  	LE: Warm, FROM, no clubbing, intact strength; no edema  	Neuro: No focal deficits, intact gait  	Psych: Normal affect and mood  	Skin: Warm, without rashes  	Vascular access: LUE Fistula without aneurysmal dilatation    LABS/STUDIES  --------------------------------------------------------------------------------              9.8    7.32  >-----------<  125      [10-03-22 @ 06:28]              31.5     134  |  97  |  25  ----------------------------<  100      [10-03-22 @ 06:28]  4.7   |  20  |  6.74        Ca     8.3     [10-03-22 @ 06:28]      Mg     2.3     [10-03-22 @ 06:28]      Phos  2.3     [10-03-22 @ 06:28]    TPro  6.7  /  Alb  3.3  /  TBili  1.1  /  DBili  x   /  AST  20  /  ALT  15  /  AlkPhos  80  [10-03-22 @ 06:28]          Creatinine Trend:  SCr 6.74 [10-03 @ 06:28]  SCr 5.40 [10-02 @ 06:51]  SCr 3.89 [10-01 @ 08:50]  SCr 7.13 [09-30 @ 11:38]  SCr 5.91 [09-26 @ 22:12]

## 2022-10-03 NOTE — DIETITIAN INITIAL EVALUATION ADULT - REASON INDICATOR FOR ASSESSMENT
RD consult for assessment/education.  Source: pt, medical record. Pt noted as Armenian speaking, declined free  services. Chart reviewed, events noted.

## 2022-10-03 NOTE — PROGRESS NOTE ADULT - ASSESSMENT
61 yo F hx of myelofibrosis, polycythemia vera, ESRD on MFW schedule via LUE AVF (last dialysis W), portal HTN, subclavian port for transfusions, gastric varices, gallstones, recent ERCP for choledocolithiasis s/p biliary stent, c/b post ERCP pancreatitis now presenting with fever and RUQ and epigastric pain. Pending NM Hepatobiliary study  63 yo F hx of myelofibrosis, polycythemia vera, ESRD on MFW schedule via LUE AVF (last dialysis W), portal HTN, subclavian port for transfusions, gastric varices, gallstones, recent ERCP for choledocolithiasis s/p biliary stent, c/b post ERCP pancreatitis now presenting with fever and RUQ and epigastric pain. NM Hepatobiliary study positive for acute cholecystitis.

## 2022-10-03 NOTE — DIETITIAN INITIAL EVALUATION ADULT - NUTRITON FOCUSED PHYSICAL EXAM
[facility]  Cardiovascular Clinic Note    Cecil Younger  : 1943  PCP: Mitesh Barrios MD    Due to COVID-19 ACTION PLAN, the patient's office visit was converted to a phone visit.    This patient verbally consents to a telephone visit.  Patient states they are Cecil Younger and that they are speaking to me from their home.     Chief Complaint: 3-month follow-up    History of Present Illness:  Cecil Younger is a 77 year old man with past medical history of CAD status post bypass and PCI, diabetes, hyperlipidemia who was diagnosed with stage IV lung cancer in 2019.    He started pembrolizumab at the end of December.  He states he has chronic shortness of breath which is remained stable.  He has chronic stable angina as well.  He denies any heart failure symptoms.    His blood pressure is remained stable.    Today he offers no cardiovascular concerns.    Labs: 3/20 BUN 29, creatinine 1.1, AST 15, ALT 22.  2019 HDL 33, triglycerides 93, LDL 85    Imaging and other studies: 2019 coronary angiogram showing good LV function.  Patent stent of the left main.  90% ramus.  Patent stents to right coronary artery.  Patent vein graft to the diagonal and OM, with some disease at the origin of both anastomosis.  Patent LIMA to the LAD.        PMHx:  Past Medical History:   Diagnosis Date   • Coronary artery disease    • Diabetes mellitus (CMS/HCC)    • Hyperlipoproteinemia      PSHx:  Past Surgical History:   Procedure Laterality Date   • Cardiac catherization  2016   • Cardiac catherization  2018   • Cardiac catherization  2019   • Coronary angioplasty with stent placement  2015    RCA, OM, LAD, ramus    • Coronary artery bypass graft  1999    x4   • Peripheral angiogram  2016   • Total knee replacement       Family Hx:  Family History   Problem Relation Age of Onset   • Patient is unaware of any medical problems Mother    • Coronary Artery Disease Father      Social Hx:  he   reports that he quit smoking about 12 years ago. His smoking use included cigarettes and cigars. He smoked 1.00 pack per day. He has never used smokeless tobacco. He reports current alcohol use. He reports that he does not use drugs.  Allergies:  ALLERGIES:   Allergen Reactions   • Simvastatin MYALGIA     Oral     Medications:  Current Outpatient Medications   Medication Sig Dispense Refill   • ANORO ELLIPTA 62.5-25 MCG/INH inhaler TAKE 1 PUFF BY MOUTH EVERY DAY     • insulin degludec (TRESIBA FLEXTOUCH) 200 UNIT/ML pen-injector 28 units daily. 90 days and 1 refill     • JARDIANCE 10 MG tablet Take 10 mg by mouth daily.     • atorvastatin (LIPITOR) 10 MG tablet TAKE 1 TABLET AT BEDTIME 90 tablet 0   • clopidogrel (PLAVIX) 75 MG tablet TAKE 1 TABLET DAILY 90 tablet 3   • metoPROLOL succinate (TOPROL-XL) 25 MG 24 hr tablet TAKE 1 TABLET ONCE DAILY (Patient taking differently: 3 times daily. ) 90 tablet 3   • naproxen sodium (ALEVE) 220 MG tablet as needed     • aspirin 81 MG tablet daily     • glipiZIDE (GLIPIZIDE XL) 10 MG 24 hr tablet 1 tablet 2 times daily.     • insulin glargine (LANTUS) 100 UNIT/ML injectable solution AS DIRECTED.     • metformin (GLUCOPHAGE-XR) 500 MG 24 hr tablet 1 tab 3x daily.     • omeprazole (PRILOSEC) 20 MG capsule daily     • isosorbide mononitrate (IMDUR) 30 MG 24 hr tablet Take 1 tablet by mouth daily.       No current facility-administered medications for this visit.      Review of Systems:  Review of Systems   Constitution: Negative for decreased appetite, diaphoresis, weight gain and weight loss.   HENT: Negative.    Cardiovascular: Positive for chest pain and dyspnea on exertion. Negative for claudication, leg swelling, palpitations and syncope.   Respiratory: Positive for shortness of breath.    Hematologic/Lymphatic: Negative for bleeding problem.   Skin: Negative.    Musculoskeletal: Negative.    Gastrointestinal: Negative.    Neurological: Negative.        Assessment/Plan:  1.   CAD- stable angina with normal LV function by angiogram 3 months ago.  Continue present medical management.  Continue aspirin and Plavix.  Plan for repeat lipids in 6 months.    2.  Stage IV lung cancer-per oncology.      Amount of time spent on the call: 10 mins    Follow up: With Dr. Lawrence in 3 to 4 months.    Diagnosis:  1. Coronary artery disease involving native coronary artery of native heart without angina pectoris    2. Atherosclerosis of coronary artery bypass graft of native heart without angina pectoris    3. Hx of CABG          Thank you for allowing me to see this patient in the office. Please call us with any questions.    Cassandra Moreno, CNP  [facility]   no...

## 2022-10-03 NOTE — DIETITIAN INITIAL EVALUATION ADULT - PERTINENT MEDS FT
MEDICATIONS  (STANDING):  calcium acetate 1334 milliGRAM(s) Oral three times a day with meals  carvedilol 3.125 milliGRAM(s) Oral every 12 hours  chlorhexidine 2% Cloths 1 Application(s) Topical daily  danazol 200 milliGRAM(s) Oral three times a day  epoetin filiberto-epbx (RETACRIT) Injectable 76290 Unit(s) IV Push <User Schedule>  multivitamin 1 Tablet(s) Oral daily  piperacillin/tazobactam IVPB.. 3.375 Gram(s) IV Intermittent every 12 hours  ruxolitinib 10 milliGRAM(s) Oral <User Schedule>    MEDICATIONS  (PRN):  aluminum hydroxide/magnesium hydroxide/simethicone Suspension 30 milliLiter(s) Oral every 4 hours PRN Dyspepsia

## 2022-10-03 NOTE — DIETITIAN INITIAL EVALUATION ADULT - PERSON TAUGHT/METHOD
Provided education on renal diet for HD. Provided education on increased demand for kcal and protein intake to help prevent muscle/weight loss, reviewed foods high in potassium, phosphorus, and sodium, discussed foods recommended within therapeutic diet and protein portion sizing. Provided [facility] HD booklet and reviewed in detail.  - Also recommended pt to follow low fat diet for now for abdominal pain and ?pancreatitis/?cholecystitis. Reviewed food sources of fat and made appropriate dietary recommendations. Pt made aware RD remains available for any questions/concerns/reinforcement as needed./verbal instruction/written material/patient instructed

## 2022-10-03 NOTE — DIETITIAN INITIAL EVALUATION ADULT - LITERATURE/VIDEOS GIVEN
Infusion Nursing Note:  Abhijeet Mccauley presents today for Injectafer.    Patient seen by provider today: No   present during visit today: Not Applicable.    Note: Unable to administer blood transfusion due to blood antibodies. Pt is scheduled next week at [facility] fpr transfusion.    Intravenous Access:  Peripheral IV placed.    Treatment Conditions:  Results reviewed, labs MET treatment parameters, ok to proceed with treatment.      Post Infusion Assessment:  Patient tolerated infusion without incident.  Patient declined observed period.  Blood return noted pre and post infusion.  Site patent and intact, free from redness, edema or discomfort.  No evidence of extravasations.  Access discontinued per protocol.       Discharge Plan:   AVS to patient via MYCHART.  Patient will return 5/27/2020 at [facility] for next appointment.   Patient discharged in stable condition accompanied by: self.  Departure Mode: Ambulatory.    Norma Oleary RN                         [facility] HD booklet

## 2022-10-03 NOTE — DIETITIAN INITIAL EVALUATION ADULT - NS FNS DIET ORDER
Diet, Renal Restrictions:   For patients receiving Renal Replacement - No Protein Restr, No Conc K, No Conc Phos, Low Sodium  1000mL Fluid Restriction ([id number]) (10-03-22 @ 16:39)

## 2022-10-03 NOTE — DIETITIAN INITIAL EVALUATION ADULT - ADD RECOMMEND
1) Recommend Renal, Low Fat diet when medically feasible.   2) Recommend change multivitamin to Nephro-Ben supplement.  3) Monitor PO intake, GI tolerance, skin integrity, labs, weight, and bowel movement regularity.   4) Honor food preferences as feasible. Assist with meals PRN and encourage PO intake.  5) RD remains available upon request and will follow-up per protocol. 1) Recommend Renal, Low Fat diet.  2) Recommend change multivitamin to Nephro-Ben supplement.  3) Monitor PO intake, GI tolerance, skin integrity, labs, weight, and bowel movement regularity.   4) Honor food preferences as feasible. Assist with meals PRN and encourage PO intake.  5) RD remains available upon request and will follow-up per protocol.

## 2022-10-03 NOTE — PROGRESS NOTE ADULT - ASSESSMENT
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 Briefly, patient known to have gastric varices since 2014 on imaging. s/p liver biopsy w/ elevated portosystemic gradient of 9 with histology demonstrating nodular regenerative hyperplasia but no fibrosis. Underwent EGD in 2021 with gastric varices (IGV1 and IGV2) not amenable to endoscopic therapy. Patient had recent ERCP done on 9/22/22 for choledocholithiasis s/p metal stent.     #Hx of Choledocholithiasis s/p ERCP on 9/22/22 with metal stent complicated with pancreatitis.   -Patient with normal Bilirubin demonstrating patent stent. Suspect ongoing abdominal pain biliary colic pain ?gallbladder vs ?pancreatitis.     # Nodular regenerative hyperplasia   #noncirrhotic portal HTN   #Gastric varices, size seem stable in ERCP compared to last EGD in 1/2021.     Recommendations:  -Stop Propanolol in favor of Coreg 3.125 mg BID  -Obtain MRCP  -Agree to continue IV abx  -Please start IVF LR @ 150 - 200 cc for supportive    -low fat diet    Hepatology will continue to follow     Recommendations preliminary until signed by attending.     Lew Larios MD  Gastroenterology/Hepatology Fellow  1st option: [phone] (text or call), ONLY available from 7:00 am to 5:00 pm.   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 Briefly, patient known to have gastric varices since 2014 on imaging. s/p liver biopsy w/ elevated portosystemic gradient of 9 with histology demonstrating nodular regenerative hyperplasia but no fibrosis. Underwent EGD in 2021 with gastric varices (IGV1 and IGV2) not amenable to endoscopic therapy. Patient had recent ERCP done on 9/22/22 for choledocholithiasis s/p metal stent.     #Hx of Choledocholithiasis s/p ERCP on 9/22/22 with metal stent complicated with pancreatitis.   -Patient with normal Bilirubin demonstrating patent stent. Suspect ongoing abdominal pain biliary colic pain ?gallbladder vs ? pancreatitis from last week.     # Nodular regenerative hyperplasia   #noncirrhotic portal HTN   #Gastric varices, size seem stable in ERCP compared to last EGD in 1/2021.     Recommendations:  -Stop Propanolol in favor of Coreg 3.125 mg BID  -Obtain MRCP  -Agree to continue IV abx  -Please start IVF LR @ 150 - 200 cc for supportive    -low fat diet    Hepatology will continue to follow     Recommendations preliminary until signed by attending.     Lew Larios MD  Gastroenterology/Hepatology Fellow  1st option: [phone] (text or call), ONLY available from 7:00 am to 5:00 pm.   **Contact on-call GI fellow via answering service ([phone]) from 5pm-7am AND on weekends/holidays**  2nd option: Available via Microsoft Teams  3rd option: Pager: [phone]

## 2022-10-03 NOTE — PROGRESS NOTE ADULT - PROBLEM SELECTOR PLAN 4
- No signs or history of bleeding  - Continue home propranolol - No signs or history of bleeding  - EGD in 2021 with gastric varices (IGV1 and IGV2) not amenable to endoscopic therapy  - Switch propranolol to carvedilol 3.125q12

## 2022-10-03 NOTE — DIETITIAN INITIAL EVALUATION ADULT - REASON FOR ADMISSION
Abdominal pain    "61 yo F hx of myelofibrosis, polycythemia vera, ESRD on MFW schedule via LUE AVF (last dialysis W), portal HTN, subclavian port for transfusions, gastric varices, gallstones, recent ERCP for choledocolithiasis s/p biliary stent, c/b post ERCP pancreatitis now presenting with fever and RUQ and epigastric pain. Pending NM Hepatobiliary study"

## 2022-10-03 NOTE — DIETITIAN INITIAL EVALUATION ADULT - ENERGY INTAKE
Fair (50-75%) Pt reports good PO intake in-house though dislikes institutional foods. Is not amenable to Nepro- has tried them and doesn't like them.

## 2022-10-04 LAB
ALBUMIN SERPL ELPH-MCNC: 3.2 G/DL — LOW (ref 3.3–5)
ALP SERPL-CCNC: 74 U/L — SIGNIFICANT CHANGE UP (ref 40–120)
ALT FLD-CCNC: 11 U/L — SIGNIFICANT CHANGE UP (ref 10–45)
ANION GAP SERPL CALC-SCNC: 11 MMOL/L — SIGNIFICANT CHANGE UP (ref 5–17)
ANISOCYTOSIS BLD QL: SLIGHT — SIGNIFICANT CHANGE UP
AST SERPL-CCNC: 16 U/L — SIGNIFICANT CHANGE UP (ref 10–40)
BASOPHILS # BLD AUTO: 0.07 K/UL — SIGNIFICANT CHANGE UP (ref 0–0.2)
BASOPHILS NFR BLD AUTO: 0.9 % — SIGNIFICANT CHANGE UP (ref 0–2)
BILIRUB SERPL-MCNC: 0.9 MG/DL — SIGNIFICANT CHANGE UP (ref 0.2–1.2)
BUN SERPL-MCNC: 12 MG/DL — SIGNIFICANT CHANGE UP (ref 7–23)
CALCIUM SERPL-MCNC: 8.2 MG/DL — LOW (ref 8.4–10.5)
CHLORIDE SERPL-SCNC: 98 MMOL/L — SIGNIFICANT CHANGE UP (ref 96–108)
CO2 SERPL-SCNC: 27 MMOL/L — SIGNIFICANT CHANGE UP (ref 22–31)
CREAT SERPL-MCNC: 4.35 MG/DL — HIGH (ref 0.5–1.3)
DACRYOCYTES BLD QL SMEAR: SLIGHT — SIGNIFICANT CHANGE UP
EGFR: 11 ML/MIN/1.73M2 — LOW
ELLIPTOCYTES BLD QL SMEAR: SLIGHT — SIGNIFICANT CHANGE UP
EOSINOPHIL # BLD AUTO: 0.14 K/UL — SIGNIFICANT CHANGE UP (ref 0–0.5)
EOSINOPHIL NFR BLD AUTO: 1.7 % — SIGNIFICANT CHANGE UP (ref 0–6)
GLUCOSE SERPL-MCNC: 113 MG/DL — HIGH (ref 70–99)
HCT VFR BLD CALC: 29.8 % — LOW (ref 34.5–45)
HGB BLD-MCNC: 9.4 G/DL — LOW (ref 11.5–15.5)
LYMPHOCYTES # BLD AUTO: 0.97 K/UL — LOW (ref 1–3.3)
LYMPHOCYTES # BLD AUTO: 12.2 % — LOW (ref 13–44)
MACROCYTES BLD QL: SLIGHT — SIGNIFICANT CHANGE UP
MAGNESIUM SERPL-MCNC: 2 MG/DL — SIGNIFICANT CHANGE UP (ref 1.6–2.6)
MANUAL SMEAR VERIFICATION: SIGNIFICANT CHANGE UP
MCHC RBC-ENTMCNC: 29.7 PG — SIGNIFICANT CHANGE UP (ref 27–34)
MCHC RBC-ENTMCNC: 31.5 GM/DL — LOW (ref 32–36)
MCV RBC AUTO: 94 FL — SIGNIFICANT CHANGE UP (ref 80–100)
MONOCYTES # BLD AUTO: 0.49 K/UL — SIGNIFICANT CHANGE UP (ref 0–0.9)
MONOCYTES NFR BLD AUTO: 6.1 % — SIGNIFICANT CHANGE UP (ref 2–14)
MYELOCYTES NFR BLD: 0.9 % — HIGH (ref 0–0)
NEUTROPHILS # BLD AUTO: 6.22 K/UL — SIGNIFICANT CHANGE UP (ref 1.8–7.4)
NEUTROPHILS NFR BLD AUTO: 73.9 % — SIGNIFICANT CHANGE UP (ref 43–77)
NEUTS BAND # BLD: 4.3 % — SIGNIFICANT CHANGE UP (ref 0–8)
NRBC # BLD: 1 /100 — HIGH (ref 0–0)
OVALOCYTES BLD QL SMEAR: SLIGHT — SIGNIFICANT CHANGE UP
PHOSPHATE SERPL-MCNC: 1.9 MG/DL — LOW (ref 2.5–4.5)
PLAT MORPH BLD: NORMAL — SIGNIFICANT CHANGE UP
PLATELET # BLD AUTO: 122 K/UL — LOW (ref 150–400)
POIKILOCYTOSIS BLD QL AUTO: SLIGHT — SIGNIFICANT CHANGE UP
POTASSIUM SERPL-MCNC: 3.9 MMOL/L — SIGNIFICANT CHANGE UP (ref 3.5–5.3)
POTASSIUM SERPL-SCNC: 3.9 MMOL/L — SIGNIFICANT CHANGE UP (ref 3.5–5.3)
PROT SERPL-MCNC: 6.2 G/DL — SIGNIFICANT CHANGE UP (ref 6–8.3)
RBC # BLD: 3.17 M/UL — LOW (ref 3.8–5.2)
RBC # FLD: 18.7 % — HIGH (ref 10.3–14.5)
RBC BLD AUTO: ABNORMAL
SODIUM SERPL-SCNC: 136 MMOL/L — SIGNIFICANT CHANGE UP (ref 135–145)
WBC # BLD: 7.96 K/UL — SIGNIFICANT CHANGE UP (ref 3.8–10.5)
WBC # FLD AUTO: 7.96 K/UL — SIGNIFICANT CHANGE UP (ref 3.8–10.5)

## 2022-10-04 PROCEDURE — 99232 SBSQ HOSP IP/OBS MODERATE 35: CPT | Mod: GC

## 2022-10-04 PROCEDURE — 74183 MRI ABD W/O CNTR FLWD CNTR: CPT | Mod: 26

## 2022-10-04 RX ORDER — PIPERACILLIN AND TAZOBACTAM 4; .5 G/20ML; G/20ML
3.38 INJECTION, POWDER, LYOPHILIZED, FOR SOLUTION INTRAVENOUS EVERY 12 HOURS
Refills: 0 | Status: DISCONTINUED | OUTPATIENT
Start: 2022-10-04 | End: 2022-10-09

## 2022-10-04 RX ADMIN — Medication 1334 MILLIGRAM(S): at 12:27

## 2022-10-04 RX ADMIN — Medication 1334 MILLIGRAM(S): at 17:41

## 2022-10-04 RX ADMIN — RUXOLITINIB 10 MILLIGRAM(S): 25 TABLET ORAL at 01:19

## 2022-10-04 RX ADMIN — PIPERACILLIN AND TAZOBACTAM 25 GRAM(S): 4; .5 INJECTION, POWDER, LYOPHILIZED, FOR SOLUTION INTRAVENOUS at 01:18

## 2022-10-04 RX ADMIN — PIPERACILLIN AND TAZOBACTAM 25 GRAM(S): 4; .5 INJECTION, POWDER, LYOPHILIZED, FOR SOLUTION INTRAVENOUS at 12:28

## 2022-10-04 RX ADMIN — DANAZOL 200 MILLIGRAM(S): 200 CAPSULE ORAL at 21:10

## 2022-10-04 RX ADMIN — DANAZOL 200 MILLIGRAM(S): 200 CAPSULE ORAL at 06:48

## 2022-10-04 RX ADMIN — DANAZOL 200 MILLIGRAM(S): 200 CAPSULE ORAL at 01:19

## 2022-10-04 RX ADMIN — Medication 1 TABLET(S): at 12:28

## 2022-10-04 RX ADMIN — CARVEDILOL PHOSPHATE 3.12 MILLIGRAM(S): 80 CAPSULE, EXTENDED RELEASE ORAL at 01:20

## 2022-10-04 RX ADMIN — DANAZOL 200 MILLIGRAM(S): 200 CAPSULE ORAL at 13:29

## 2022-10-04 RX ADMIN — CARVEDILOL PHOSPHATE 3.12 MILLIGRAM(S): 80 CAPSULE, EXTENDED RELEASE ORAL at 06:46

## 2022-10-04 RX ADMIN — Medication 1334 MILLIGRAM(S): at 08:41

## 2022-10-04 RX ADMIN — CHLORHEXIDINE GLUCONATE 1 APPLICATION(S): 213 SOLUTION TOPICAL at 12:28

## 2022-10-04 RX ADMIN — CARVEDILOL PHOSPHATE 3.12 MILLIGRAM(S): 80 CAPSULE, EXTENDED RELEASE ORAL at 17:40

## 2022-10-04 NOTE — PROGRESS NOTE ADULT - PROBLEM SELECTOR PLAN 2
- MWF via AVF left arm  - Nephrology following, for HD tonight. EPO post dialysis  - Jakafi post dialysis. Nephro-deonna. Calcium acetate  - Renal diet - MWF via AVF left arm  - Nephrology following. EPO post dialysis  - Jakafi post dialysis. Nephro-deonna. Calcium acetate  - Renal diet

## 2022-10-04 NOTE — PROGRESS NOTE ADULT - PROBLEM SELECTOR PLAN 3
- Bi monthly transfusions at [facility]. Followed by Dr. Benjy Guo  - US doppler abdomen no portal or splenic vein thrombosis.  - Liver biopsy with nodular regenerative hyperplasia  - Hgb 7.0 baseline high 8's/low 9's based on outpatient labs  - Continue Danazol  - Heme consult  - s/p 2unit PRBC

## 2022-10-04 NOTE — PROGRESS NOTE ADULT - PROBLEM SELECTOR PLAN 4
- No signs or history of bleeding  - EGD in 2021 with gastric varices (IGV1 and IGV2) not amenable to endoscopic therapy  - Switch propranolol to carvedilol 3.125q12

## 2022-10-04 NOTE — PROGRESS NOTE ADULT - ASSESSMENT
63 yo F hx of myelofibrosis, polycythemia vera, ESRD on MFW schedule via LUE AVF (last dialysis W), portal HTN, subclavian port for transfusions, gastric varices, gallstones, recent ERCP for choledocolithiasis s/p biliary stent, c/b post ERCP pancreatitis now presenting with fever and RUQ and epigastric pain. NM Hepatobiliary study positive for acute cholecystitis.

## 2022-10-04 NOTE — PROGRESS NOTE ADULT - SUBJECTIVE AND OBJECTIVE BOX
PROGRESS NOTE:   Authored by: Atul Conroy M.D.   Internal Medicine PGY-1  Please Contact Via Teams    Patient is a 62y old  Female who presents with a chief complaint of Abdominal pain    "63 yo F hx of myelofibrosis, polycythemia vera, ESRD on MFW schedule via LUE AVF (last dialysis W), portal HTN, subclavian port for transfusions, gastric varices, gallstones, recent ERCP for choledocolithiasis s/p biliary stent, c/b post ERCP pancreatitis now presenting with fever and RUQ and epigastric pain. Pending NM Hepatobiliary study" (03 Oct 2022 17:00)      SUBJECTIVE / OVERNIGHT EVENTS:  No acute events overnight.     ADDITIONAL REVIEW OF SYSTEMS:  Patient denies fevers, chills, chest pain, shortness of breath, nausea, abdominal pain, diarrhea, constipation, dysuria, leg swelling, headache, light headedness.    MEDICATIONS  (STANDING):  calcium acetate 1334 milliGRAM(s) Oral three times a day with meals  carvedilol 3.125 milliGRAM(s) Oral every 12 hours  chlorhexidine 2% Cloths 1 Application(s) Topical daily  danazol 200 milliGRAM(s) Oral three times a day  epoetin filiberto-epbx (RETACRIT) Injectable 37254 Unit(s) IV Push <User Schedule>  multivitamin 1 Tablet(s) Oral daily  Nephro-deonna 1 Tablet(s) Oral daily  piperacillin/tazobactam IVPB.. 3.375 Gram(s) IV Intermittent every 12 hours  ruxolitinib 10 milliGRAM(s) Oral <User Schedule>    MEDICATIONS  (PRN):  aluminum hydroxide/magnesium hydroxide/simethicone Suspension 30 milliLiter(s) Oral every 4 hours PRN Dyspepsia      CAPILLARY BLOOD GLUCOSE        I&O's Summary    02 Oct 2022 07:01  -  03 Oct 2022 07:00  --------------------------------------------------------  IN: 480 mL / OUT: 0 mL / NET: 480 mL    03 Oct 2022 07:01  -  04 Oct 2022 06:58  --------------------------------------------------------  IN: 220 mL / OUT: 1000 mL / NET: -780 mL        PHYSICAL EXAM:  Vital Signs Last 24 Hrs  T(C): 36.6 (04 Oct 2022 04:59), Max: 36.8 (03 Oct 2022 20:08)  T(F): 97.9 (04 Oct 2022 04:59), Max: 98.3 (03 Oct 2022 20:08)  HR: 72 (04 Oct 2022 04:59) (67 - 76)  BP: 127/69 (04 Oct 2022 04:59) (124/67 - 149/82)  BP(mean): --  RR: 18 (04 Oct 2022 04:59) (17 - 18)  SpO2: 98% (04 Oct 2022 04:59) (95% - 100%)    Parameters below as of 04 Oct 2022 04:59  Patient On (Oxygen Delivery Method): room air        CONSTITUTIONAL: NAD, well-developed  RESPIRATORY: Normal respiratory effort; lungs are clear to auscultation bilaterally  CARDIOVASCULAR: Regular rate and rhythm, normal S1 and S2, no murmur/rub/gallop; No lower extremity edema; Peripheral pulses are 2+ bilaterally  ABDOMEN: Nontender to palpation, normoactive bowel sounds, no rebound/guarding; No hepatosplenomegaly  MUSCLOSKELETAL: no clubbing or cyanosis of digits; no joint swelling or tenderness to palpation  PSYCH: A+O to person, place, and time; affect appropriate    LABS:                        9.8    7.32  )-----------( 125      ( 03 Oct 2022 06:28 )             31.5     10-03    134<L>  |  97  |  25<H>  ----------------------------<  100<H>  4.7   |  20<L>  |  6.74<H>    Ca    8.3<L>      03 Oct 2022 06:28  Phos  2.3     10-03  Mg     2.3     10-03    TPro  6.7  /  Alb  3.3  /  TBili  1.1  /  DBili  x   /  AST  20  /  ALT  15  /  AlkPhos  80  10-03                Tele Reviewed:    RADIOLOGY & ADDITIONAL TESTS:  Results Reviewed:   Imaging Personally Reviewed:  Electrocardiogram Personally Reviewed:     PROGRESS NOTE:   Authored by: Atul Conroy M.D.   Internal Medicine PGY-1  Please Contact Via Teams    Patient is a 62y old  Female who presents with a chief complaint of Abdominal pain    "61 yo F hx of myelofibrosis, polycythemia vera, ESRD on MFW schedule via LUE AVF (last dialysis W), portal HTN, subclavian port for transfusions, gastric varices, gallstones, recent ERCP for choledocolithiasis s/p biliary stent, c/b post ERCP pancreatitis now presenting with fever and RUQ and epigastric pain. Pending NM Hepatobiliary study" (03 Oct 2022 17:00)      SUBJECTIVE / OVERNIGHT EVENTS:  No acute events overnight. This patient states that her abdominal pain is nearly gone. Though she is  on exam and still having post-prandial pain.    MEDICATIONS  (STANDING):  calcium acetate 1334 milliGRAM(s) Oral three times a day with meals  carvedilol 3.125 milliGRAM(s) Oral every 12 hours  chlorhexidine 2% Cloths 1 Application(s) Topical daily  danazol 200 milliGRAM(s) Oral three times a day  epoetin filiberto-epbx (RETACRIT) Injectable 93730 Unit(s) IV Push <User Schedule>  multivitamin 1 Tablet(s) Oral daily  Nephro-deonna 1 Tablet(s) Oral daily  piperacillin/tazobactam IVPB.. 3.375 Gram(s) IV Intermittent every 12 hours  ruxolitinib 10 milliGRAM(s) Oral <User Schedule>    MEDICATIONS  (PRN):  aluminum hydroxide/magnesium hydroxide/simethicone Suspension 30 milliLiter(s) Oral every 4 hours PRN Dyspepsia      CAPILLARY BLOOD GLUCOSE        I&O's Summary    02 Oct 2022 07:01  -  03 Oct 2022 07:00  --------------------------------------------------------  IN: 480 mL / OUT: 0 mL / NET: 480 mL    03 Oct 2022 07:01  -  04 Oct 2022 06:58  --------------------------------------------------------  IN: 220 mL / OUT: 1000 mL / NET: -780 mL        PHYSICAL EXAM:  Vital Signs Last 24 Hrs  T(C): 36.6 (04 Oct 2022 04:59), Max: 36.8 (03 Oct 2022 20:08)  T(F): 97.9 (04 Oct 2022 04:59), Max: 98.3 (03 Oct 2022 20:08)  HR: 72 (04 Oct 2022 04:59) (67 - 76)  BP: 127/69 (04 Oct 2022 04:59) (124/67 - 149/82)  BP(mean): --  RR: 18 (04 Oct 2022 04:59) (17 - 18)  SpO2: 98% (04 Oct 2022 04:59) (95% - 100%)    Parameters below as of 04 Oct 2022 04:59  Patient On (Oxygen Delivery Method): room air      GENERAL: NAD  HEAD:  Atraumatic, Normocephalic  EYES: EOMI, PERRLA, conjunctiva and sclera clear  ENT: Moist mucous membranes  NECK: Supple, No JVD  CHEST/LUNG: Clear to auscultation bilaterally; Unlabored respirations  HEART: Regular rate and rhythm; No murmurs. Chest tender to palpation  Peripheral edema: none  ABDOMEN: RUQ with hightower sign and epigastric tenderness   EXTREMITIES:  2+ radial pulses, 2+ dorsalis pedis. AVF patent.  NERVOUS SYSTEM:  A&Ox3, no gross lateralizing deficits   SKIN: No rashes or lesions    LABS:                        9.8    7.32  )-----------( 125      ( 03 Oct 2022 06:28 )             31.5     10-03    134<L>  |  97  |  25<H>  ----------------------------<  100<H>  4.7   |  20<L>  |  6.74<H>    Ca    8.3<L>      03 Oct 2022 06:28  Phos  2.3     10-03  Mg     2.3     10-03    TPro  6.7  /  Alb  3.3  /  TBili  1.1  /  DBili  x   /  AST  20  /  ALT  15  /  AlkPhos  80  10-03            < from: NM Hepatobiliary Imaging w/ RX (10.03.22 @ 15:11) >  FINDINGS: There is prompt, homogeneous uptake of radiotracer by the   hepatocytes. Activity is first seen in the bowel at 15 minutes. The   gallbladder is not visualized at any time during the study, despite   administration of morphine. There is good clearance of activity from the   liver at the end of the study.    IMPRESSION: Abnormal morphine-augmented hepatobiliary scan.    Findings consistent with acute cholecystitis.    < end of copied text >       PROGRESS NOTE:   Authored by: Atul Conroy M.D.   Internal Medicine PGY-1  Please Contact Via Teams    Patient is a 62y old  Female who presents with a chief complaint of Abdominal pain    "63 yo F hx of myelofibrosis, polycythemia vera, ESRD on MFW schedule via LUE AVF (last dialysis W), portal HTN, subclavian port for transfusions, gastric varices, gallstones, recent ERCP for choledocolithiasis s/p biliary stent, c/b post ERCP pancreatitis now presenting with fever and RUQ and epigastric pain. Pending NM Hepatobiliary study" (03 Oct 2022 17:00)      SUBJECTIVE / OVERNIGHT EVENTS:  No acute events overnight. This patient states that her abdominal pain is nearly gone. Though she is  on exam and still having post-prandial pain.    MEDICATIONS  (STANDING):  calcium acetate 1334 milliGRAM(s) Oral three times a day with meals  carvedilol 3.125 milliGRAM(s) Oral every 12 hours  chlorhexidine 2% Cloths 1 Application(s) Topical daily  danazol 200 milliGRAM(s) Oral three times a day  epoetin filiberto-epbx (RETACRIT) Injectable 18410 Unit(s) IV Push <User Schedule>  multivitamin 1 Tablet(s) Oral daily  Nephro-deonna 1 Tablet(s) Oral daily  piperacillin/tazobactam IVPB.. 3.375 Gram(s) IV Intermittent every 12 hours  ruxolitinib 10 milliGRAM(s) Oral <User Schedule>    MEDICATIONS  (PRN):  aluminum hydroxide/magnesium hydroxide/simethicone Suspension 30 milliLiter(s) Oral every 4 hours PRN Dyspepsia      CAPILLARY BLOOD GLUCOSE        I&O's Summary    02 Oct 2022 07:01  -  03 Oct 2022 07:00  --------------------------------------------------------  IN: 480 mL / OUT: 0 mL / NET: 480 mL    03 Oct 2022 07:01  -  04 Oct 2022 06:58  --------------------------------------------------------  IN: 220 mL / OUT: 1000 mL / NET: -780 mL        PHYSICAL EXAM:  Vital Signs Last 24 Hrs  T(C): 36.6 (04 Oct 2022 04:59), Max: 36.8 (03 Oct 2022 20:08)  T(F): 97.9 (04 Oct 2022 04:59), Max: 98.3 (03 Oct 2022 20:08)  HR: 72 (04 Oct 2022 04:59) (67 - 76)  BP: 127/69 (04 Oct 2022 04:59) (124/67 - 149/82)  BP(mean): --  RR: 18 (04 Oct 2022 04:59) (17 - 18)  SpO2: 98% (04 Oct 2022 04:59) (95% - 100%)    Parameters below as of 04 Oct 2022 04:59  Patient On (Oxygen Delivery Method): room air      GENERAL: NAD  HEAD:  Atraumatic, Normocephalic  EYES: EOMI, PERRLA, conjunctiva and sclera clear  ENT: Moist mucous membranes  NECK: Supple, No JVD  CHEST/LUNG: Clear to auscultation bilaterally; Unlabored respirations  HEART: Regular rate and rhythm; No murmurs. Chest tender to palpation  Peripheral edema: none  ABDOMEN: RUQ with hightower sign and epigastric tenderness   EXTREMITIES:  2+ radial pulses, 2+ dorsalis pedis. AVF patent.  NERVOUS SYSTEM:  A&Ox3, no gross lateralizing deficits   SKIN: No rashes or lesions    LABS:                        9.8    7.32  )-----------( 125      ( 03 Oct 2022 06:28 )             31.5     10-03    134<L>  |  97  |  25<H>  ----------------------------<  100<H>  4.7   |  20<L>  |  6.74<H>    Ca    8.3<L>      03 Oct 2022 06:28  Phos  2.3     10-03  Mg     2.3     10-03    TPro  6.7  /  Alb  3.3  /  TBili  1.1  /  DBili  x   /  AST  20  /  ALT  15  /  AlkPhos  80  10-03            < from: NM Hepatobiliary Imaging w/ RX (10.03.22 @ 15:11) >  FINDINGS: There is prompt, homogeneous uptake of radiotracer by the   hepatocytes. Activity is first seen in the bowel at 15 minutes. The   gallbladder is not visualized at any time during the study, despite   administration of morphine. There is good clearance of activity from the   liver at the end of the study.    IMPRESSION: Abnormal morphine-augmented hepatobiliary scan.    Findings consistent with acute cholecystitis.    < end of copied text >      < from: MR MRCP w/wo IV Cont (10.04.22 @ 16:13) >  IMPRESSION:    Few scattered subcentimeter hepatic lesions are suspicious for   microabscesses. No biliary dilation    Mildly distended gallbladder with nonspecific diffuse wall thickening and   air from instrumentation. Differential considerations include acute   cholecystitis versus reactive change    Massive splenomegaly with varices    Secondary hemochromatosis    < end of copied text >

## 2022-10-04 NOTE — PROGRESS NOTE ADULT - PROBLEM SELECTOR PLAN 1
- Last admission pt with cholelithiasis and choledocholithiasis. Had ERCP with stone removal c/b post procedure pancreatitis that delayed surgery. Cholelithiasis and a focus of antidependent air seen on CT. Gallbladder wall thickening/edema on CT from 9/26.   - HIDA scan positive for cholecystitis --> transplant and general surgery notified.   - Lipase also elevated at 257.  - Patient without elevated WBC but has myelofibrosis. Febrile. Blood cultures sent. Empiric zosyn 9/30-->10/4  - Lactate 0.5 no concern for mesenteric ischemia  - Low fat diet.  - Pending MRCP with contrast. Nephro aware - Last admission pt with cholelithiasis and choledocholithiasis. Had ERCP with stone removal c/b post procedure pancreatitis that delayed surgery. Cholelithiasis and a focus of antidependent air seen on CT. Gallbladder wall thickening/edema on CT from 9/26.   - HIDA scan positive for cholecystitis --> transplant and general surgery notified.   - Lipase also elevated at 257.  - Patient without elevated WBC but has myelofibrosis. Febrile. Blood cultures sent. Empiric zosyn 9/30-->10/4  - Lactate 0.5 no concern for mesenteric ischemia  - Low fat diet.  - Pending MRCP with contrast today at 5pm. Nephro aware. NPO from 1pm - Last admission pt with cholelithiasis and choledocholithiasis. Had ERCP with stone removal c/b post procedure pancreatitis that delayed surgery. Cholelithiasis and a focus of antidependent air seen on CT. Gallbladder wall thickening/edema on CT from 9/26.   - HIDA scan positive for cholecystitis --> transplant and general surgery notified.   - Lipase also elevated at 257.  - Patient without elevated WBC but has myelofibrosis. Febrile. Blood cultures sent. Empiric zosyn 9/30-->10/7  - Lactate 0.5 no concern for mesenteric ischemia  - Low fat diet.  - MRCP with mildly distended gallbladder with nonspecific diffuse wall thickening and   air from instrumentation

## 2022-10-05 ENCOUNTER — TRANSCRIPTION ENCOUNTER (OUTPATIENT)
Age: 62
End: 2022-10-05

## 2022-10-05 PROCEDURE — 99233 SBSQ HOSP IP/OBS HIGH 50: CPT | Mod: GC

## 2022-10-05 PROCEDURE — 99233 SBSQ HOSP IP/OBS HIGH 50: CPT

## 2022-10-05 PROCEDURE — 99239 HOSP IP/OBS DSCHRG MGMT >30: CPT | Mod: GC

## 2022-10-05 RX ORDER — CARVEDILOL PHOSPHATE 80 MG/1
1 CAPSULE, EXTENDED RELEASE ORAL
Qty: 120 | Refills: 0
Start: 2022-10-05 | End: 2022-12-03

## 2022-10-05 RX ORDER — OXYCODONE HYDROCHLORIDE 5 MG/1
5 TABLET ORAL ONCE
Refills: 0 | Status: DISCONTINUED | OUTPATIENT
Start: 2022-10-05 | End: 2022-10-05

## 2022-10-05 RX ORDER — CALCIUM ACETATE 667 MG
2 TABLET ORAL
Qty: 0 | Refills: 0 | DISCHARGE

## 2022-10-05 RX ORDER — DANAZOL 200 MG/1
1 CAPSULE ORAL
Qty: 90 | Refills: 0
Start: 2022-10-05 | End: 2022-11-03

## 2022-10-05 RX ORDER — RUXOLITINIB 25 MG/1
1 TABLET ORAL
Qty: 13 | Refills: 0
Start: 2022-10-05 | End: 2022-11-03

## 2022-10-05 RX ORDER — RUXOLITINIB 25 MG/1
1 TABLET ORAL
Qty: 0 | Refills: 0 | DISCHARGE

## 2022-10-05 RX ORDER — SENNA PLUS 8.6 MG/1
1 TABLET ORAL ONCE
Refills: 0 | Status: COMPLETED | OUTPATIENT
Start: 2022-10-05 | End: 2022-10-05

## 2022-10-05 RX ORDER — PROPRANOLOL HCL 160 MG
0 CAPSULE, EXTENDED RELEASE 24HR ORAL
Qty: 0 | Refills: 0 | DISCHARGE

## 2022-10-05 RX ORDER — DANAZOL 200 MG/1
1 CAPSULE ORAL
Qty: 0 | Refills: 0 | DISCHARGE

## 2022-10-05 RX ORDER — POLYETHYLENE GLYCOL 3350 17 G/17G
17 POWDER, FOR SOLUTION ORAL ONCE
Refills: 0 | Status: COMPLETED | OUTPATIENT
Start: 2022-10-05 | End: 2022-10-05

## 2022-10-05 RX ORDER — ACETAMINOPHEN 500 MG
2 TABLET ORAL
Qty: 240 | Refills: 0
Start: 2022-10-05 | End: 2022-11-03

## 2022-10-05 RX ADMIN — PIPERACILLIN AND TAZOBACTAM 25 GRAM(S): 4; .5 INJECTION, POWDER, LYOPHILIZED, FOR SOLUTION INTRAVENOUS at 00:53

## 2022-10-05 RX ADMIN — CHLORHEXIDINE GLUCONATE 1 APPLICATION(S): 213 SOLUTION TOPICAL at 11:42

## 2022-10-05 RX ADMIN — ERYTHROPOIETIN 20000 UNIT(S): 10000 INJECTION, SOLUTION INTRAVENOUS; SUBCUTANEOUS at 13:39

## 2022-10-05 RX ADMIN — Medication 1334 MILLIGRAM(S): at 11:42

## 2022-10-05 RX ADMIN — PIPERACILLIN AND TAZOBACTAM 25 GRAM(S): 4; .5 INJECTION, POWDER, LYOPHILIZED, FOR SOLUTION INTRAVENOUS at 17:02

## 2022-10-05 RX ADMIN — DANAZOL 200 MILLIGRAM(S): 200 CAPSULE ORAL at 17:02

## 2022-10-05 RX ADMIN — DANAZOL 200 MILLIGRAM(S): 200 CAPSULE ORAL at 22:49

## 2022-10-05 RX ADMIN — OXYCODONE HYDROCHLORIDE 5 MILLIGRAM(S): 5 TABLET ORAL at 23:37

## 2022-10-05 RX ADMIN — DANAZOL 200 MILLIGRAM(S): 200 CAPSULE ORAL at 05:29

## 2022-10-05 RX ADMIN — RUXOLITINIB 10 MILLIGRAM(S): 25 TABLET ORAL at 22:49

## 2022-10-05 RX ADMIN — CARVEDILOL PHOSPHATE 3.12 MILLIGRAM(S): 80 CAPSULE, EXTENDED RELEASE ORAL at 05:29

## 2022-10-05 RX ADMIN — SENNA PLUS 1 TABLET(S): 8.6 TABLET ORAL at 08:31

## 2022-10-05 RX ADMIN — OXYCODONE HYDROCHLORIDE 5 MILLIGRAM(S): 5 TABLET ORAL at 03:45

## 2022-10-05 RX ADMIN — Medication 1 TABLET(S): at 11:41

## 2022-10-05 RX ADMIN — Medication 1334 MILLIGRAM(S): at 08:31

## 2022-10-05 RX ADMIN — Medication 30 MILLILITER(S): at 02:36

## 2022-10-05 RX ADMIN — OXYCODONE HYDROCHLORIDE 5 MILLIGRAM(S): 5 TABLET ORAL at 02:44

## 2022-10-05 RX ADMIN — POLYETHYLENE GLYCOL 3350 17 GRAM(S): 17 POWDER, FOR SOLUTION ORAL at 08:31

## 2022-10-05 RX ADMIN — Medication 1 TABLET(S): at 11:42

## 2022-10-05 RX ADMIN — OXYCODONE HYDROCHLORIDE 5 MILLIGRAM(S): 5 TABLET ORAL at 23:07

## 2022-10-05 RX ADMIN — CARVEDILOL PHOSPHATE 3.12 MILLIGRAM(S): 80 CAPSULE, EXTENDED RELEASE ORAL at 17:02

## 2022-10-05 NOTE — PROGRESS NOTE ADULT - ASSESSMENT
61 yo F hx of myelofibrosis, polycythemia vera, ESRD on MFW schedule via LUE AVF (last dialysis W), portal HTN, subclavian port for transfusions, gastric varices, gallstones, recent ERCP for choledocolithiasis s/p biliary stent, c/b post ERCP pancreatitis + pneumobilia now presenting with fever and RUQ and epigastric pain found to have cholecystitis. Hematology consulted for PV and myelofibrosis     Hematology history  History of MAK 2 + polycythemia vera, initially diagnosed in 2012. 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 Hollie Guzmán in [place] (# [phone]). She has had a bone marrow biopsy done at the time of diagnosis. Was previously non complaint with therapy there, however has been quite complaint with the hydroxyurea since coming to [facility] fellows clinic here in 2017. Patient now follows with Dr. Jacky Guo after closure of the fellows clinic.   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  She was admitted to [facility] from July 3-5, 2018 due to hyperkalemia and thrombosis of her AVF. Hematology was consulted and recommended phlebotomy, but patient refused. She was discharged on Hydrea 500mg on HD days, which she is still on. As of 9/27/19 she has transferred from the hematology fellows clinic to Dr. uGo's service at [facility]. She was previously treated with HU in the setting of abdominal pain 2/2 splenomegaly w/ splenic infarcts; Hgb came up with treatment and pain improved. After a month of Jakafi 15 mg following HD three times a week, patient abdominal pain and LUQ pain resolved, but she became weak and anemic, requiring transfusion at the hospital. She is currently on Jakafi 10 mg on days she gets HD three times a week, and was started on danazol 600mg daily    #PV and myelofibrosis  - hx MAK 2+ PV and secondary myelofibrosis on jakafi 10mg TIW w/ dialysis and danazol 200mg TID, receives bimonthly transfusions at [facility] s/p 2 units of blood this hospital admission for Hg 7 (baseline 8-9)  - follows w/ Dr. Jacky Guo  - continue above medications  - peripheral blood smear 10/1: tear drop cells, metamyelocytes, myelocytes, 1 rare blast cell noted which is in line w/ myelofibrosis diagnosis, RBC and platelet morphology unable to be appreciated likely 2/2 to poor slide preparation  - on discharge please include f/u at [facility] w/ Dr. Guo    #acute cholecystitis  - noted on HIDA, MRCP w/ hepatic microabscess  - on zosyn  - f/u GI, surgery recs

## 2022-10-05 NOTE — DISCHARGE NOTE PROVIDER - NSFOLLOWUPCLINICS_GEN_ALL_ED_FT
[facility]  Nephrology  [address] [place] [address], [address] [place] [address]  Phone: [phone]  Fax:   Follow Up Time: 2 weeks     [facility]  Nephrology  [address], [address] [place] [address]  Phone: [phone]  Fax:   Follow Up Time: 2 weeks    [facility]  [facility]  [facility] [address] [place] [address]  Phone: [phone]  Fax:   Follow Up Time: 2 weeks     [facility]  Nephrology  [address], [address] [place] [address]  Phone: [phone]  Fax:   Follow Up Time: 2 weeks    [facility]  [facility]  [address] [facility] [address] [place] [address]  Phone: [phone]  Fax:   Follow Up Time: 2 weeks    [facility]  Gastroenterology  [address] [place] [address]  Phone: [phone]  Fax:   Follow Up Time: 2 weeks

## 2022-10-05 NOTE — DISCHARGE NOTE PROVIDER - NSDCFUSCHEDAPPT_GEN_ALL_CORE_FT
[facility] Physician [facility]  [facility]  Scheduled Appointment: 10/20/2022    Jacky Guo  [facility]  [facility]  Scheduled Appointment: 10/20/2022    Wily Wright  [facility]  ENDOVASCULAR [address]   Scheduled Appointment: 10/25/2022     [facility]  [facility]  Scheduled Appointment: 10/20/2022    Marg[facility] Copeland  [facility]  Scheduled Appointment: 10/20/2022    Wily Wright  [facility]  ENDOVASCULAR [facility] [place]   Scheduled Appointment: 10/25/2022

## 2022-10-05 NOTE — PROGRESS NOTE ADULT - SUBJECTIVE AND OBJECTIVE BOX
Mumtaz Resendiz MD PGY-4 Hematology Oncology  Reachable on TEAMS    INTERVAL HPI/OVERNIGHT EVENTS:  Patient S&E at bedside. No acute events, no active complaints    VITAL SIGNS:  T(F): 98 (10-05-22 @ 08:30)  HR: 63 (10-05-22 @ 08:30)  BP: 128/77 (10-05-22 @ 08:30)  RR: 18 (10-05-22 @ 08:30)  SpO2: 99% (10-05-22 @ 08:30)  Wt(kg): --    PHYSICAL EXAM:    Constitutional: NAD  Eyes: EOMI, sclera non-icteric  Neck: supple, no masses, no JVD  Respiratory: CTA b/l, good air entry b/l  Cardiovascular: RRR, no M/R/G  Gastrointestinal: soft, NTND, no masses palpable, + BS, no hepatosplenomegaly  Extremities: no c/c/e  Neurological: AAOx3      MEDICATIONS  (STANDING):  calcium acetate 1334 milliGRAM(s) Oral three times a day with meals  carvedilol 3.125 milliGRAM(s) Oral every 12 hours  chlorhexidine 2% Cloths 1 Application(s) Topical daily  danazol 200 milliGRAM(s) Oral three times a day  epoetin filiberto-epbx (RETACRIT) Injectable 35140 Unit(s) IV Push <User Schedule>  multivitamin 1 Tablet(s) Oral daily  Nephro-deonna 1 Tablet(s) Oral daily  piperacillin/tazobactam IVPB.. 3.375 Gram(s) IV Intermittent every 12 hours  ruxolitinib 10 milliGRAM(s) Oral <User Schedule>    MEDICATIONS  (PRN):  aluminum hydroxide/magnesium hydroxide/simethicone Suspension 30 milliLiter(s) Oral every 4 hours PRN Dyspepsia      Allergies    No Known Allergies    Intolerances        LABS:                        9.4    7.96  )-----------( 122      ( 04 Oct 2022 07:31 )             29.8     10-04    136  |  98  |  12  ----------------------------<  113<H>  3.9   |  27  |  4.35<H>    Ca    8.2<L>      04 Oct 2022 07:31  Phos  1.9     10-04  Mg     2.0     10-04    TPro  6.2  /  Alb  3.2<L>  /  TBili  0.9  /  DBili  x   /  AST  16  /  ALT  11  /  AlkPhos  74  10-04          RADIOLOGY & ADDITIONAL TESTS:  Studies reviewed.   Mumtaz Resendiz MD PGY-4 Hematology Oncology  Reachable on TEAMS  Armenian  used #[id number]    INTERVAL HPI/OVERNIGHT EVENTS:  Patient S&E at bedside. Feels uncomfortable    VITAL SIGNS:  T(F): 98 (10-05-22 @ 08:30)  HR: 63 (10-05-22 @ 08:30)  BP: 128/77 (10-05-22 @ 08:30)  RR: 18 (10-05-22 @ 08:30)  SpO2: 99% (10-05-22 @ 08:30)  Wt(kg): --    PHYSICAL EXAM:    Constitutional: NAD  Eyes: EOMI, sclera non-icteric  Neck: supple, no masses, no JVD  Respiratory: CTA b/l, good air entry b/l  Cardiovascular: RRR, no M/R/G  Gastrointestinal: soft, splenomegaly appreciated up to ~3cm below costophrenic angle  Extremities: no c/c/e  Neurological: AAOx3      MEDICATIONS  (STANDING):  calcium acetate 1334 milliGRAM(s) Oral three times a day with meals  carvedilol 3.125 milliGRAM(s) Oral every 12 hours  chlorhexidine 2% Cloths 1 Application(s) Topical daily  danazol 200 milliGRAM(s) Oral three times a day  epoetin filiberto-epbx (RETACRIT) Injectable 27057 Unit(s) IV Push <User Schedule>  multivitamin 1 Tablet(s) Oral daily  Nephro-deonna 1 Tablet(s) Oral daily  piperacillin/tazobactam IVPB.. 3.375 Gram(s) IV Intermittent every 12 hours  ruxolitinib 10 milliGRAM(s) Oral <User Schedule>    MEDICATIONS  (PRN):  aluminum hydroxide/magnesium hydroxide/simethicone Suspension 30 milliLiter(s) Oral every 4 hours PRN Dyspepsia      Allergies    No Known Allergies    Intolerances        LABS:                        9.4    7.96  )-----------( 122      ( 04 Oct 2022 07:31 )             29.8     10-04    136  |  98  |  12  ----------------------------<  113<H>  3.9   |  27  |  4.35<H>    Ca    8.2<L>      04 Oct 2022 07:31  Phos  1.9     10-04  Mg     2.0     10-04    TPro  6.2  /  Alb  3.2<L>  /  TBili  0.9  /  DBili  x   /  AST  16  /  ALT  11  /  AlkPhos  74  10-04          RADIOLOGY & ADDITIONAL TESTS:  Studies reviewed.   Mumtaz Resendiz MD PGY-4 Hematology Oncology  Reachable on TEAMS  Mongolian  used #[id number]    INTERVAL HPI/OVERNIGHT EVENTS:  Patient S&E at bedside. Feels some indigestion when eating, denies pain    VITAL SIGNS:  T(F): 98 (10-05-22 @ 08:30)  HR: 63 (10-05-22 @ 08:30)  BP: 128/77 (10-05-22 @ 08:30)  RR: 18 (10-05-22 @ 08:30)  SpO2: 99% (10-05-22 @ 08:30)  Wt(kg): --    PHYSICAL EXAM:    Constitutional: NAD  Eyes: EOMI, sclera non-icteric  Neck: supple, no masses, no JVD  Respiratory: CTA b/l, good air entry b/l  Cardiovascular: RRR, no M/R/G  Gastrointestinal: soft, splenomegaly appreciated up to ~3cm below costophrenic angle  Extremities: no c/c/e  Neurological: AAOx3      MEDICATIONS  (STANDING):  calcium acetate 1334 milliGRAM(s) Oral three times a day with meals  carvedilol 3.125 milliGRAM(s) Oral every 12 hours  chlorhexidine 2% Cloths 1 Application(s) Topical daily  danazol 200 milliGRAM(s) Oral three times a day  epoetin filiberto-epbx (RETACRIT) Injectable 57433 Unit(s) IV Push <User Schedule>  multivitamin 1 Tablet(s) Oral daily  Nephro-deonna 1 Tablet(s) Oral daily  piperacillin/tazobactam IVPB.. 3.375 Gram(s) IV Intermittent every 12 hours  ruxolitinib 10 milliGRAM(s) Oral <User Schedule>    MEDICATIONS  (PRN):  aluminum hydroxide/magnesium hydroxide/simethicone Suspension 30 milliLiter(s) Oral every 4 hours PRN Dyspepsia      Allergies    No Known Allergies    Intolerances        LABS:                        9.4    7.96  )-----------( 122      ( 04 Oct 2022 07:31 )             29.8     10-04    136  |  98  |  12  ----------------------------<  113<H>  3.9   |  27  |  4.35<H>    Ca    8.2<L>      04 Oct 2022 07:31  Phos  1.9     10-04  Mg     2.0     10-04    TPro  6.2  /  Alb  3.2<L>  /  TBili  0.9  /  DBili  x   /  AST  16  /  ALT  11  /  AlkPhos  74  10-04          RADIOLOGY & ADDITIONAL TESTS:  Studies reviewed.

## 2022-10-05 NOTE — PROGRESS NOTE ADULT - PROBLEM SELECTOR PLAN 3
- Bi monthly transfusions at [facility]. Followed by Dr. Benjy Guo  - US doppler abdomen no portal or splenic vein thrombosis.  - Liver biopsy with nodular regenerative hyperplasia  - Hgb 7.0 baseline high 8's/low 9's based on outpatient labs  - Continue Danazol  - Heme consult  - s/p 2unit PRBC

## 2022-10-05 NOTE — DISCHARGE NOTE PROVIDER - ATTENDING DISCHARGE PHYSICAL EXAMINATION:
T(C): 36.4 (10-05-22 @ 13:04), Max: 36.8 (10-04-22 @ 15:30)  HR: 62 (10-05-22 @ 13:04) (60 - 70)  BP: 142/73 (10-05-22 @ 13:04) (123/67 - 142/73)  RR: 18 (10-05-22 @ 13:04) (18 - 18)  SpO2: 95% (10-05-22 @ 13:04) (94% - 100%)  General: NAD, comfortable  Eyes: no conjunctival erythema  ENT: MMM  Neck: Neck supple, No JVD  Respiratory: CTA B/L, No wheezing, rales, rhonchi  CV: RRR no murmurs  Abdominal: Soft, NT, ND +BS  MSK: no focal weakness  Extremities: No edema, 2+ peripheral pulses  Neurology: A&Ox3, nonfocal, RAHMAN x 4  Skin: No Rashes, Hematoma, Ecchymosis  Psych: Calm and appropriate

## 2022-10-05 NOTE — DISCHARGE NOTE PROVIDER - NS AS DC PROVIDER CONTACT Y/N MULTI
Discussed with patient's brother at bedside who is the next of kin in the presence of cousin about the plan of getting a PEG tube for nutrition and medications since NG tube placement has failed after multiple attempts and also taking into consideration that he has failed barium swallow study. Patient and next of kin are in agreement with PEG tube being placed. General surgery has been consulted for the same.      Khris Randhawa MD  PGY-1, Internal Medicine Resident  [facility], [place]         5/30/2022, 1:34 PM Yes

## 2022-10-05 NOTE — DISCHARGE NOTE PROVIDER - CARE PROVIDERS DIRECT ADDRESSES
[email or web address] ,DirectAddress_Unknown

## 2022-10-05 NOTE — DISCHARGE NOTE PROVIDER - NSFOLLOWUPCLINICSTOKEN_GEN_ALL_ED_FT
766396:2 weeks|| ||00\01||False; 293830:2 weeks|| ||00\01||False;644444:2 weeks|| ||00\01||False; 142325:2 weeks|| ||00\01||False;160837:2 weeks|| ||00\01||False;131724:2 weeks|| ||00\01||False;

## 2022-10-05 NOTE — PROGRESS NOTE ADULT - SUBJECTIVE AND OBJECTIVE BOX
Gastroenterology/Hepatology Progress Note    Interval Events:     Allergies:  No Known Allergies    Hospital Medications:  aluminum hydroxide/magnesium hydroxide/simethicone Suspension 30 milliLiter(s) Oral every 4 hours PRN  carvedilol 3.125 milliGRAM(s) Oral every 12 hours  chlorhexidine 2% Cloths 1 Application(s) Topical daily  danazol 200 milliGRAM(s) Oral three times a day  epoetin filiberto-epbx (RETACRIT) Injectable 16085 Unit(s) IV Push <User Schedule>  multivitamin 1 Tablet(s) Oral daily  Nephro-deonna 1 Tablet(s) Oral daily  piperacillin/tazobactam IVPB.. 3.375 Gram(s) IV Intermittent every 12 hours  ruxolitinib 10 milliGRAM(s) Oral <User Schedule>    ROS: 14 point ROS negative unless otherwise state in subjective    PHYSICAL EXAM:   Vital Signs:  Vital Signs Last 24 Hrs  T(C): 36.4 (05 Oct 2022 13:04), Max: 36.8 (05 Oct 2022 04:30)  T(F): 97.5 (05 Oct 2022 13:04), Max: 98.3 (05 Oct 2022 04:30)  HR: 62 (05 Oct 2022 13:04) (60 - 70)  BP: 142/73 (05 Oct 2022 13:04) (123/67 - 142/73)  BP(mean): --  RR: 18 (05 Oct 2022 13:04) (18 - 18)  SpO2: 95% (05 Oct 2022 13:04) (95% - 100%)    Parameters below as of 05 Oct 2022 13:04  Patient On (Oxygen Delivery Method): room air      Daily     Daily Weight in k.4 (05 Oct 2022 08:30)    GENERAL:  No acute distress  HEENT:  NCAT, no scleral icterus  CHEST: no resp distress  HEART:  RRR  ABDOMEN:  Soft, non-tender, non-distended   EXTREMITIES:  No cyanosis, clubbing, or edema  SKIN:  No rash/erythema/ecchymoses   NEURO:  Alert and oriented x 3, no asterixis     LABS:                        9.4    7.96  )-----------( 122      ( 04 Oct 2022 07:31 )             29.8       10-04    136  |  98  |  12  ----------------------------<  113<H>  3.9   |  27  |  4.35<H>    Ca    8.2<L>      04 Oct 2022 07:31  Phos  1.9     10-  Mg     2.0     10-    TPro  6.2  /  Alb  3.2<L>  /  TBili  0.9  /  DBili  x   /  AST  16  /  ALT  11  /  AlkPhos  74  10-    LIVER FUNCTIONS - ( 04 Oct 2022 07:31 )  Alb: 3.2 g/dL / Pro: 6.2 g/dL / ALK PHOS: 74 U/L / ALT: 11 U/L / AST: 16 U/L / GGT: x               Imaging:  reviewed         Gastroenterology/Hepatology Progress Note    Interval Events: No events overnight, abdominal pain improving    Allergies:  No Known Allergies    Hospital Medications:  aluminum hydroxide/magnesium hydroxide/simethicone Suspension 30 milliLiter(s) Oral every 4 hours PRN  carvedilol 3.125 milliGRAM(s) Oral every 12 hours  chlorhexidine 2% Cloths 1 Application(s) Topical daily  danazol 200 milliGRAM(s) Oral three times a day  epoetin filiberto-epbx (RETACRIT) Injectable 08647 Unit(s) IV Push <User Schedule>  multivitamin 1 Tablet(s) Oral daily  Nephro-deonna 1 Tablet(s) Oral daily  piperacillin/tazobactam IVPB.. 3.375 Gram(s) IV Intermittent every 12 hours  ruxolitinib 10 milliGRAM(s) Oral <User Schedule>    ROS: 14 point ROS negative unless otherwise state in subjective    PHYSICAL EXAM:   Vital Signs:  Vital Signs Last 24 Hrs  T(C): 36.4 (05 Oct 2022 13:04), Max: 36.8 (05 Oct 2022 04:30)  T(F): 97.5 (05 Oct 2022 13:04), Max: 98.3 (05 Oct 2022 04:30)  HR: 62 (05 Oct 2022 13:04) (60 - 70)  BP: 142/73 (05 Oct 2022 13:04) (123/67 - 142/73)  BP(mean): --  RR: 18 (05 Oct 2022 13:04) (18 - 18)  SpO2: 95% (05 Oct 2022 13:04) (95% - 100%)    Parameters below as of 05 Oct 2022 13:04  Patient On (Oxygen Delivery Method): room air      Daily     Daily Weight in k.4 (05 Oct 2022 08:30)    GENERAL:  No acute distress  HEENT:  NCAT, no scleral icterus  CHEST: no resp distress  HEART:  RRR  ABDOMEN:  Soft, non-tender, non-distended   EXTREMITIES:  No cyanosis, clubbing, or edema  SKIN:  No rash/erythema/ecchymoses   NEURO:  Alert and oriented x 3, no asterixis     LABS:                        9.4    7.96  )-----------( 122      ( 04 Oct 2022 07:31 )             29.8       10-04    136  |  98  |  12  ----------------------------<  113<H>  3.9   |  27  |  4.35<H>    Ca    8.2<L>      04 Oct 2022 07:31  Phos  1.9     10-  Mg     2.0     10-    TPro  6.2  /  Alb  3.2<L>  /  TBili  0.9  /  DBili  x   /  AST  16  /  ALT  11  /  AlkPhos  74  10-    LIVER FUNCTIONS - ( 04 Oct 2022 07:31 )  Alb: 3.2 g/dL / Pro: 6.2 g/dL / ALK PHOS: 74 U/L / ALT: 11 U/L / AST: 16 U/L / GGT: x               Imaging:  reviewed

## 2022-10-05 NOTE — DISCHARGE NOTE PROVIDER - NSDCACTIVITY_GEN_ALL_CORE
No heavy lifting/straining Showering allowed/No heavy lifting/straining/Follow Instructions Provided by your Surgical Team

## 2022-10-05 NOTE — PROGRESS NOTE ADULT - SUBJECTIVE AND OBJECTIVE BOX
[facility] Division of Kidney Diseases & Hypertension  FOLLOW UP NOTE  [phone]--------------------------------------------------------------------------------  Chief Complaint:Abdominal pain        24 hour events/subjective:  Plan for HD today. HIDa done and MRCP w/ hepatic microabscess ntoed, on Zosyn.       PAST HISTORY  --------------------------------------------------------------------------------  No significant changes to PMH, PSH, FHx, SHx, unless otherwise noted    ALLERGIES & MEDICATIONS  --------------------------------------------------------------------------------  Allergies    No Known Allergies    Intolerances      Standing Inpatient Medications  calcium acetate 1334 milliGRAM(s) Oral three times a day with meals  carvedilol 3.125 milliGRAM(s) Oral every 12 hours  chlorhexidine 2% Cloths 1 Application(s) Topical daily  danazol 200 milliGRAM(s) Oral three times a day  epoetin filiberto-epbx (RETACRIT) Injectable 25575 Unit(s) IV Push <User Schedule>  multivitamin 1 Tablet(s) Oral daily  Nephro-deonna 1 Tablet(s) Oral daily  piperacillin/tazobactam IVPB.. 3.375 Gram(s) IV Intermittent every 12 hours  ruxolitinib 10 milliGRAM(s) Oral <User Schedule>    PRN Inpatient Medications  aluminum hydroxide/magnesium hydroxide/simethicone Suspension 30 milliLiter(s) Oral every 4 hours PRN      REVIEW OF SYSTEMS  --------------------------------------------------------------------------------  Gen: No  fevers/chills  Skin: No rashes  Head/Eyes/Ears/Mouth: No headache; Normal hearing; Normal vision w/o blurriness  Respiratory: No dyspnea, cough, wheezing, hemoptysis  CV: No chest pain, PND, orthopnea  GI: + abdominal pain,- diarrhea, constipation, nausea, vomiting  : No increased frequency, dysuria, hematuria, nocturia  MSK: No joint pain/swelling; no back pain; no edema  Neuro: No dizziness/lightheadedness, weakness, seizures, numbness, tingling    All other systems were reviewed and are negative, except as noted.    VITALS/PHYSICAL EXAM  --------------------------------------------------------------------------------  T(C): 36.7 (10-05-22 @ 08:30), Max: 36.8 (10-04-22 @ 15:30)  HR: 63 (10-05-22 @ 08:30) (61 - 70)  BP: 128/77 (10-05-22 @ 08:30) (123/67 - 128/77)  RR: 18 (10-05-22 @ 08:30) (18 - 18)  SpO2: 99% (10-05-22 @ 08:30) (94% - 99%)  Wt(kg): --        10-04-22 @ 07:01  -  10-05-22 @ 07:00  --------------------------------------------------------  IN: 610 mL / OUT: 0 mL / NET: 610 mL    10-05-22 @ 07:01  -  10-05-22 @ 11:41  --------------------------------------------------------  IN: 320 mL / OUT: 0 mL / NET: 320 mL      Physical Exam:  Gen: NAD, uncomfortable   	HEENT:  supple neck, clear oropharynx  	Pulm: CTA B/L  	CV: RRR, S1S2; no rub  	Back: No spinal or CVA tenderness; no sacral edema  	Abd: +BS, soft, epigastric tenderness   	: No suprapubic tenderness  	LE: Warm, FROM, no clubbing, intact strength; no edema  	Neuro: No focal deficits, intact gait  	Psych: Normal affect and mood  	Skin: Warm, without rashes  	Vascular access: LUE Fistula without aneurysmal dilatation      LABS/STUDIES  --------------------------------------------------------------------------------              9.4    7.96  >-----------<  122      [10-04-22 @ 07:31]              29.8     136  |  98  |  12  ----------------------------<  113      [10-04-22 @ 07:31]  3.9   |  27  |  4.35        Ca     8.2     [10-04-22 @ 07:31]      Mg     2.0     [10-04-22 @ 07:31]      Phos  1.9     [10-04-22 @ 07:31]    TPro  6.2  /  Alb  3.2  /  TBili  0.9  /  DBili  x   /  AST  16  /  ALT  11  /  AlkPhos  74  [10-04-22 @ 07:31]          Creatinine Trend:  SCr 4.35 [10-04 @ 07:31]  SCr 6.74 [10-03 @ 06:28]  SCr 5.40 [10-02 @ 06:51]  SCr 3.89 [10-01 @ 08:50]  SCr 7.13 [09-30 @ 11:38]

## 2022-10-05 NOTE — PROGRESS NOTE ADULT - ASSESSMENT
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 Briefly, patient known to have gastric varices since 2014 on imaging. s/p liver biopsy w/ elevated portosystemic gradient of 9 with histology demonstrating nodular regenerative hyperplasia but no fibrosis. Underwent EGD in 2021 with gastric varices (IGV1 and IGV2) not amenable to endoscopic therapy. Patient had recent ERCP done on 9/22/22 for choledocholithiasis s/p metal stent.     #Hx of Choledocholithiasis s/p ERCP on 9/22/22 with metal stent complicated with pancreatitis.   -Patient with normal Bilirubin demonstrating patent stent. Suspect ongoing abdominal pain biliary colic pain ?gallbladder vs ? pancreatitis from last week.   - MRCP w/ possible acute vikas, hepatic microabscesses. No plan for surgical intervention inpatient.   #Nodular regenerative hyperplasia   #noncirrhotic portal HTN   #Gastric varices, size seem stable in ERCP compared to last EGD in 1/2021.     Recommendations:  -c/w Coreg 3.125 mg BID  -Low fat diet  - d/c on abx  -patient to f/u with hepatology on discharge    All recommendations are tentative until note is attested by attending.     Nurys Link, PGY5  Gastroenterology/Hepatology Fellow  Available on Microsoft Teams  [phone] ([employer] Short Range Pager)  [phone] (Long Range Pager)    After 5pm, please contact the on-call GI fellow. [phone]

## 2022-10-05 NOTE — DISCHARGE NOTE PROVIDER - CARE PROVIDER_API CALL
Juan Gaspar)  Surgery  [address] [place] [address]  Phone: [phone]  Fax: [phone]  Follow Up Time: 1-3 days   Dagher, Nabil N (MD)  Surgery  [address] [place] [address]  Phone: [phone]  Fax: [phone]  Follow Up Time:

## 2022-10-05 NOTE — DISCHARGE NOTE PROVIDER - NSDCCPCAREPLAN_GEN_ALL_CORE_FT
PRINCIPAL DISCHARGE DIAGNOSIS  Diagnosis: Acute cholecystitis  Assessment and Plan of Treatment: You came to the hospital because you had abdominal pain. You had several imaging studies of your abdomen. Imaging of your gallbladder showed acute inflammation. There were no problems with your stent that was placed last admission. You were started on antibiotics and will continue your course outpatient. You were seen by the general and transplant surgery teams. They decieded not to operate on you while in the hospital because of your other conditions. You should follow with these doctors outpatient. Please follow up with your primary care doctor within 1 week of discharge for further medical care.  If you experience fevers, chills, shortness of breath, chest pain, severe abdominal pain, falls with head trauma, or fainting, then please seek immediate emergency medical services.      SECONDARY DISCHARGE DIAGNOSES  Diagnosis: ESRD on dialysis  Assessment and Plan of Treatment: You have end stage renal disease for which you are on dialyssi. You were taking phoslo with meals prior to coming to the hospital. This medicine was stopped in the hospital because it was making your phosphorus too low. Do not continue this medicine outpatient and follow with your kidney doctor.     PRINCIPAL DISCHARGE DIAGNOSIS  Diagnosis: Acute cholecystitis  Assessment and Plan of Treatment: You came to the hospital because you had abdominal pain. You had several imaging studies of your abdomen. Imaging of your gallbladder showed acute inflammation. There were no problems with your stent that was placed last admission. You were started on antibiotics and will continue your course outpatient. You were seen by the general and transplant surgery teams. They decieded not to operate on you while in the hospital because of your other conditions. You should follow with these doctors outpatient. Please follow up with your primary care doctor and surgeon within 1 week of discharge for further medical care.  If you experience fevers, chills, shortness of breath, chest pain, severe abdominal pain, falls with head trauma, or fainting, then please seek immediate emergency medical services.      SECONDARY DISCHARGE DIAGNOSES  Diagnosis: ESRD on dialysis  Assessment and Plan of Treatment: You have end stage renal disease for which you are on dialyssi. You were taking phoslo with meals prior to coming to the hospital. This medicine was stopped in the hospital because it was making your phosphorus too low. Do not continue this medicine outpatient and follow with your kidney doctor.     PRINCIPAL DISCHARGE DIAGNOSIS  Diagnosis: Acute cholecystitis  Assessment and Plan of Treatment: You had surgery to remove your gallbladder.   Your stent was also removed  Continue to take your antibiotics as directed and follow-up with your surgeon within 1 week after discharge  No heavy lifting more than 5 pounds.  You may shower, clean and dry as much as possible. No soaking or scrubbing wound. No swimming pools, or baths.    You may walk, but always with help, especially in the beginning after major surgery.  Contact our Transplant clinic at [phone], return to our Emergency Department or NOTIFY YOUR SURGEON IF:  You have any bleeding that does not stop, any pus draining from your wound, any fever (over 100.4 F) or chills, persistent nausea/vomiting with inability to tolerate food or liquids, persistent diarrhea, and/or if your pain is not controlled on your discharge pain medications.        SECONDARY DISCHARGE DIAGNOSES  Diagnosis: ESRD on dialysis  Assessment and Plan of Treatment: You have end stage renal disease for which you are on dialyssi. You were taking phoslo with meals prior to coming to the hospital. This medicine was stopped in the hospital because it was making your phosphorus too low. Do not continue this medicine outpatient and follow with your kidney doctor.

## 2022-10-05 NOTE — DISCHARGE NOTE PROVIDER - NSDCMRMEDTOKEN_GEN_ALL_CORE_FT
acetaminophen 325 mg oral tablet: 2 tab(s) orally every 6 hours  Augmentin 500 mg-125 mg oral tablet: 1 tab(s) orally once a day  ON DIALYSIS DAYS, PLEASE TAKE AFTER DIALYSIS   carvedilol 3.125 mg oral tablet: 1 tab(s) orally every 12 hours  danazol 200 mg oral capsule: 1 cap(s) orally 3 times a day  Jakafi 15 mg oral tablet: 1 tab(s) orally Monday, Wednesday, and Friday after dialysis  Nephro-Ben oral tablet: 1 tab(s) orally once a day   acetaminophen 325 mg oral tablet: 2 tab(s) orally every 6 hours  carvedilol 3.125 mg oral tablet: 1 tab(s) orally every 12 hours  danazol 200 mg oral capsule: 1 cap(s) orally 3 times a day  epoetin filiberto: 21666 unit(s) intravenous Monday, Wednesday, and Friday with dialysis as per nephrology  Jakafi 15 mg oral tablet: 1 tab(s) orally Monday, Wednesday, and Friday after dialysis  Nephro-Ben oral tablet: 1 tab(s) orally once a day   acetaminophen 325 mg oral tablet: 2 tab(s) orally every 6 hours  carvedilol 3.125 mg oral tablet: 1 tab(s) orally every 12 hours  danazol 200 mg oral capsule: 1 cap(s) orally 3 times a day  Dilaudid 2 mg oral tablet: 0.5 tab(s) orally every 8 hours MDD:3mg  epoetin filiberto: 90779 unit(s) intravenous Monday, Wednesday, and Friday with dialysis as per nephrology  Jakafi 15 mg oral tablet: 1 tab(s) orally Monday, Wednesday, and Friday after dialysis  Nephro-Ben oral tablet: 1 tab(s) orally once a day   acetaminophen 325 mg oral tablet: 2 tab(s) orally every 6 hours  Augmentin 500 mg-125 mg oral tablet: 500 milligram(s) orally once a day to go thru 10/28/22  Take after dialysis on HD days  carvedilol 3.125 mg oral tablet: 1 tab(s) orally every 12 hours  cyclobenzaprine 5 mg oral tablet: 1 tab(s) orally 3 times a day, As needed, Muscle Spasm  danazol 200 mg oral capsule: 1 cap(s) orally 3 times a day  epoetin filiberto: 51110 unit(s) intravenous Monday, Wednesday, and Friday with dialysis as per nephrology  fluconazole 200 mg oral tablet: 1 tab(s) orally once a day to go thru 11/10/22  Take after dialysis on dialysis days   gabapentin 100 mg oral capsule: 1 cap(s) orally once a day  Jakafi 15 mg oral tablet: 1 tab(s) orally Monday, Wednesday, and Friday after dialysis  Multiple Vitamins oral tablet: 1 tab(s) orally once a day  Nephro-Ben oral tablet: 1 tab(s) orally once a day  polyethylene glycol 3350 oral powder for reconstitution: 17 gram(s) orally every 12 hours  senna leaf extract oral tablet: 1 tab(s) orally every 12 hours

## 2022-10-05 NOTE — PROGRESS NOTE ADULT - PROBLEM SELECTOR PLAN 3
Recently phos has been low. Please hold phoslo for now    Obtain phosphorus and calcium levels with BMPs    If you have any questions, please feel free to contact me  Keshawn Farfan  Nephrology Fellow  [phone]; Prefer [employer] TEAMS  (After 5pm or on weekends please page the on-call fellow).    Patient was discussed with Dr. Fajardo who agrees with my A/P. Addendum to follow

## 2022-10-05 NOTE — PROGRESS NOTE ADULT - ATTENDING COMMENTS
63 yo F hx of myelofibrosis, polycythemia vera, ESRD on MFW schedule via LUE AVF (last dialysis W), portal HTN, subclavian port for transfusions, gastric varices, gallstones, recent ERCP for choledocholithiasis s/p biliary stent, c/b post ERCP pancreatitis + pneumobilia now presenting with fever and RUQ and epigastric pain.    #RUQ and epigastric abdominal pain  #?pancreatitis  #ESRD  #gastric varices/portal HTN  #myelofibrosis  #PCV  #hx of ERCP pancreatitis  - patient was seen and examined at bedside, hx of ERCP induced pancreatitis  - did not meet sepsis criteria on admission  - RUQ tenderness to palpation, improved  - US abdomen: gastric and splenic varices   - CTAP showing normal liver contour, splenomegaly, and pneumobilia(previously seen on imaging)  - HIDA scan and MRCP positive for acute cholecystitis  - transplant surgery/trauma surgery consulted, no acute surgical intervention given comorbidities  - plan for outpatient elective cholecystectomy after completion of antibiotics  - on Zosyn empirically, blood culture negative to date  - will switch over to Augmentin PO to complete 10 days of antibiotics  - GI and Heme/Onc Recs, nephrology recs appreciated  - outpatient GI/hepatology/heme/onc follow up  - discharge ready today

## 2022-10-05 NOTE — PROGRESS NOTE ADULT - ATTENDING COMMENTS
61 yo F hx of myelofibrosis, polycythemia vera, ESRD on MFW schedule via LUE AVF (last dialysis W), portal HTN, subclavian port for transfusions, gastric varices, gallstones, recent ERCP for choledocolithiasis s/p biliary stent, c/b post ERCP pancreatitis + pneumobilia now presenting with fever and RUQ and epigastric pain found to have cholecystitis. Hematology consulted for PV and myelofibrosis     Hematology history  History of MAK 2 + polycythemia vera, initially diagnosed in 2012. 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 After a month of Jakafi 15 mg following HD three times a week, patient abdominal pain and LUQ pain resolved, but she became weak and anemic, requiring transfusion at the hospital. She is currently on Jakafi 10 mg on days she gets HD three times a week, and was started on danazol 600mg daily    #acute cholecystitis  - noted on HIDA, MRCP w/ hepatic microabscess  - on zosyn  - f/u GI, surgery recs

## 2022-10-05 NOTE — PROGRESS NOTE ADULT - SUBJECTIVE AND OBJECTIVE BOX
PROGRESS NOTE:   Authored by: Atul Conroy M.D.   Internal Medicine PGY-1  Please Contact Via Teams    Patient is a 62y old  Female who presents with a chief complaint of Abdominal pain (04 Oct 2022 06:58)      SUBJECTIVE / OVERNIGHT EVENTS:  No acute events overnight.     ADDITIONAL REVIEW OF SYSTEMS:  Patient denies fevers, chills, chest pain, shortness of breath, nausea, abdominal pain, diarrhea, constipation, dysuria, leg swelling, headache, light headedness.    MEDICATIONS  (STANDING):  calcium acetate 1334 milliGRAM(s) Oral three times a day with meals  carvedilol 3.125 milliGRAM(s) Oral every 12 hours  chlorhexidine 2% Cloths 1 Application(s) Topical daily  danazol 200 milliGRAM(s) Oral three times a day  epoetin filiberto-epbx (RETACRIT) Injectable 80065 Unit(s) IV Push <User Schedule>  multivitamin 1 Tablet(s) Oral daily  Nephro-deonna 1 Tablet(s) Oral daily  piperacillin/tazobactam IVPB.. 3.375 Gram(s) IV Intermittent every 12 hours  ruxolitinib 10 milliGRAM(s) Oral <User Schedule>    MEDICATIONS  (PRN):  aluminum hydroxide/magnesium hydroxide/simethicone Suspension 30 milliLiter(s) Oral every 4 hours PRN Dyspepsia      CAPILLARY BLOOD GLUCOSE        I&O's Summary    03 Oct 2022 07:01  -  04 Oct 2022 07:00  --------------------------------------------------------  IN: 220 mL / OUT: 1000 mL / NET: -780 mL    04 Oct 2022 07:01  -  05 Oct 2022 06:48  --------------------------------------------------------  IN: 610 mL / OUT: 0 mL / NET: 610 mL        PHYSICAL EXAM:  Vital Signs Last 24 Hrs  T(C): 36.8 (05 Oct 2022 04:30), Max: 36.8 (04 Oct 2022 15:30)  T(F): 98.3 (05 Oct 2022 04:30), Max: 98.3 (05 Oct 2022 04:30)  HR: 69 (05 Oct 2022 04:30) (61 - 70)  BP: 126/67 (05 Oct 2022 04:30) (108/68 - 126/67)  BP(mean): --  RR: 18 (05 Oct 2022 04:30) (18 - 18)  SpO2: 95% (05 Oct 2022 04:30) (94% - 96%)    Parameters below as of 05 Oct 2022 04:30  Patient On (Oxygen Delivery Method): room air        CONSTITUTIONAL: NAD, well-developed  RESPIRATORY: Normal respiratory effort; lungs are clear to auscultation bilaterally  CARDIOVASCULAR: Regular rate and rhythm, normal S1 and S2, no murmur/rub/gallop; No lower extremity edema; Peripheral pulses are 2+ bilaterally  ABDOMEN: Nontender to palpation, normoactive bowel sounds, no rebound/guarding; No hepatosplenomegaly  MUSCLOSKELETAL: no clubbing or cyanosis of digits; no joint swelling or tenderness to palpation  PSYCH: A+O to person, place, and time; affect appropriate    LABS:                        9.4    7.96  )-----------( 122      ( 04 Oct 2022 07:31 )             29.8     10-04    136  |  98  |  12  ----------------------------<  113<H>  3.9   |  27  |  4.35<H>    Ca    8.2<L>      04 Oct 2022 07:31  Phos  1.9     10-04  Mg     2.0     10-04    TPro  6.2  /  Alb  3.2<L>  /  TBili  0.9  /  DBili  x   /  AST  16  /  ALT  11  /  AlkPhos  74  10-04                Tele Reviewed:    RADIOLOGY & ADDITIONAL TESTS:  Results Reviewed:   Imaging Personally Reviewed:  Electrocardiogram Personally Reviewed:     PROGRESS NOTE:   Authored by: Atul Conroy M.D.   Internal Medicine PGY-1  Please Contact Via Teams    Patient is a 62y old  Female who presents with a chief complaint of Abdominal pain (04 Oct 2022 06:58)      SUBJECTIVE / OVERNIGHT EVENTS:  Overnight had 1 episode of abdominal pain that resolved with oxycodone. This morning she reports constipation. Says her last BM was on friday. Given senna + miralax. She continues to have post-prandial abdominal pain and RUQ tenderness.    MEDICATIONS  (STANDING):  calcium acetate 1334 milliGRAM(s) Oral three times a day with meals  carvedilol 3.125 milliGRAM(s) Oral every 12 hours  chlorhexidine 2% Cloths 1 Application(s) Topical daily  danazol 200 milliGRAM(s) Oral three times a day  epoetin filiberto-epbx (RETACRIT) Injectable 23572 Unit(s) IV Push <User Schedule>  multivitamin 1 Tablet(s) Oral daily  Nephro-deonna 1 Tablet(s) Oral daily  piperacillin/tazobactam IVPB.. 3.375 Gram(s) IV Intermittent every 12 hours  ruxolitinib 10 milliGRAM(s) Oral <User Schedule>    MEDICATIONS  (PRN):  aluminum hydroxide/magnesium hydroxide/simethicone Suspension 30 milliLiter(s) Oral every 4 hours PRN Dyspepsia      CAPILLARY BLOOD GLUCOSE        I&O's Summary    03 Oct 2022 07:01  -  04 Oct 2022 07:00  --------------------------------------------------------  IN: 220 mL / OUT: 1000 mL / NET: -780 mL    04 Oct 2022 07:01  -  05 Oct 2022 06:48  --------------------------------------------------------  IN: 610 mL / OUT: 0 mL / NET: 610 mL        PHYSICAL EXAM:  Vital Signs Last 24 Hrs  T(C): 36.8 (05 Oct 2022 04:30), Max: 36.8 (04 Oct 2022 15:30)  T(F): 98.3 (05 Oct 2022 04:30), Max: 98.3 (05 Oct 2022 04:30)  HR: 69 (05 Oct 2022 04:30) (61 - 70)  BP: 126/67 (05 Oct 2022 04:30) (108/68 - 126/67)  BP(mean): --  RR: 18 (05 Oct 2022 04:30) (18 - 18)  SpO2: 95% (05 Oct 2022 04:30) (94% - 96%)    Parameters below as of 05 Oct 2022 04:30  Patient On (Oxygen Delivery Method): room air      GENERAL: NAD  HEAD:  Atraumatic, Normocephalic  EYES: EOMI, PERRLA, conjunctiva and sclera clear  ENT: Moist mucous membranes  NECK: Supple, No JVD  CHEST/LUNG: Clear to auscultation bilaterally; Unlabored respirations  HEART: Regular rate and rhythm; No murmurs. Chest tender to palpation  Peripheral edema: none  ABDOMEN: RUQ with hightower sign and epigastric tenderness   EXTREMITIES:  2+ radial pulses, 2+ dorsalis pedis. AVF patent.  NERVOUS SYSTEM:  A&Ox3, no gross lateralizing deficits   SKIN: No rashes or lesions    LABS:                        9.4    7.96  )-----------( 122      ( 04 Oct 2022 07:31 )             29.8     10-04    136  |  98  |  12  ----------------------------<  113<H>  3.9   |  27  |  4.35<H>    Ca    8.2<L>      04 Oct 2022 07:31  Phos  1.9     10-04  Mg     2.0     10-04    TPro  6.2  /  Alb  3.2<L>  /  TBili  0.9  /  DBili  x   /  AST  16  /  ALT  11  /  AlkPhos  74  10-04          No new micro or imaging

## 2022-10-05 NOTE — PROGRESS NOTE ADULT - ASSESSMENT
63 yo F hx of myelofibrosis, polycythemia vera, ESRD on MFW schedule via LUE AVF (last dialysis W), portal HTN, subclavian port for transfusions, gastric varices, gallstones, recent ERCP for choledocolithiasis s/p biliary stent, c/b post ERCP pancreatitis now presenting with fever and RUQ and epigastric pain. NM Hepatobiliary study positive for acute cholecystitis. 61 yo F hx of myelofibrosis, polycythemia vera, ESRD on MFW schedule via LUE AVF (last dialysis W), portal HTN, subclavian port for transfusions, gastric varices, gallstones, recent ERCP for choledocolithiasis s/p biliary stent, c/b post ERCP pancreatitis now presenting with fever and RUQ and epigastric pain. NM Hepatobiliary study positive for acute cholecystitis. MRCP with likely acute vikas.

## 2022-10-05 NOTE — DISCHARGE NOTE PROVIDER - NSDCCPTREATMENT_GEN_ALL_CORE_FT
PRINCIPAL PROCEDURE  Procedure: NM HIDA scan  Findings and Treatment: FINDINGS: There is prompt, homogeneous uptake of radiotracer by the   hepatocytes. Activity is first seen in the bowel at 15 minutes. The   gallbladder is not visualized at any time during the study, despite   administration of morphine. There is good clearance of activity from the   liver at the end of the study.      SECONDARY PROCEDURE  Procedure: Magnetic resonance cholangiopancreatography (MRCP) with contrast  Findings and Treatment: FINDINGS: Motion artifacts degrade some of the imaging.  LOWER CHEST: Trace pleural effusions, larger on the right. Mild dependent   atelectasis.  LIVER: Hepatomegaly approximately measures 21 cm. Background iron   deposition. Patent hepatic vasculature  Few scattered subcentimeter T2 bright lesions for example measuring 6 mm   towards the dome are suspicious for microabscesses  BILE DUCTS: Distal CBD metallic wall stent. Pneumobilia.  GALLBLADDER: Mildly distended. Contains air, small gallstones and sludge.   Mild diffuse wall thickening. Trace pericholecystic fluid  SPLEEN: Massive splenomegaly approximately measuring 20 cm in length.   Extensive splenic hilar and perigastric varices. Iron deposition  PANCREAS: Peripancreatic varices  ADRENALS: Within normal limits.  KIDNEYS/URETERS: Atrophic bilaterally. No hydronephrosis. Subcentimeter   bilateral simple cysts  VISUALIZED PORTIONS:  BOWEL: Unobstructed. Gastric wall thickening. Gastric fundal submucosal   varices  PERITONEUM: Trace abdominal ascites  VESSELS: Atherosclerotic changes.  RETROPERITONEUM/LYMPH NODES: No lymphadenopathy.  ABDOMINAL WALL: Small fat-containing umbilical hernia. Mild anasarca  BONES: No significant acute bony abnormality.      Procedure: Ultrasound of abdomen with Doppler and color flow  Findings and Treatment: Splenic Vein: The splenic vein is patent with hepatopetal flow.  Portal Veins: Main portal vein measures 1.4 cm in caliber. The main   portal vein is patent with overall phasic hepatopetal flow, however there   is intermittent reversal of flow during respiration. The main portal vein   measures 1.4 cm and the velocity is 38 cm/s.  The right portal vein and branches are patent and demonstrate phasic   hepatopetal flow.  The left portal vein and branches are patent and demonstrates phasic   hepatopetal flow.  Hepatic Artery: Hepatic artery peak systolic velocity is 61 cm/s. Normal   Doppler ultrasound.  Hepatic Veins: The right, mid and left hepatic veins are patent and   demonstrate phasic flow. The intrahepatic IVC is patent with phasic flow.  Gastric and splenic varices are present.  Additional abdominal findings:  Common bile duct with stent measuring up to 6 mm.  Gallbladder filled with sludge/small stones.  Trace right pleural effusion.  Splenomegaly, measuring 21 cm.      Procedure: CT abdomen pelvis w con  Findings and Treatment: FINDINGS:  LOWER CHEST: Partially imaged catheter with tip in the right atrium.  LIVER: Within normal limits.  BILE DUCTS: Common bile duct stent and associated pneumobilia.  GALLBLADDER: Cholelithiasis and a focus of antidependent air.  SPLEEN: Markedly enlarged spleen.  PANCREAS: Within normal limits.  ADRENALS: Within normal limits.  KIDNEYS/URETERS: Atrophic kidneys. Subcentimeter hypodense left renal   foci too small to characterize.  BLADDER: Within normal limits.  REPRODUCTIVE ORGANS: Uterus and adnexa within normal limits.  BOWEL: No bowel obstruction. Appendix is normal.  PERITONEUM: No ascites.  VESSELS: Upper abdominal varices including gastric varices as at prior   imaging.  RETROPERITONEUM/LYMPH NODES: No lymphadenopathy.  ABDOMINAL WALL: Subcutaneous nodule in the right anterior abdominal wall,   new from recent prior imaging likely related to subcutaneous injection.  BONES: Degenerative changes with mild heterogeneity of the osseous   structures.

## 2022-10-05 NOTE — PROGRESS NOTE ADULT - PROBLEM SELECTOR PLAN 2
- MWF via AVF left arm  - Nephrology following. EPO post dialysis  - Jakafi post dialysis. Nephro-deonna. Calcium acetate  - Renal diet - MWF via AVF left arm  - Nephrology following. EPO post dialysis  - Jakafi post dialysis. Nephro-deonna. Holding calcium acetate due to low phos.   - Renal diet

## 2022-10-05 NOTE — DISCHARGE NOTE PROVIDER - HOSPITAL COURSE
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 States pain has been ongoing for past 2 weeks, and has been in ER 3 times over this time. [place] chills last night, noted to be febrile this morning. Nausea and few episodes of nbnb emesis this AM.    ED Course:  Febrile to 100.5. Normal HR and BP. Pt was given tylenol, zosyn, and blood cultures obtained. U/S abdomen showed no acute pathology, CBD stent with pneumobilia, marked splenomegaly with upper abdominal varices. CT CA/P with contrast showed the same. No imaging evidence of cirrhosis. The patient was also anemic to hemoglobin 7.0, which was below baseline.     Hospital course:   She was given 2 units PRBC for anemia with appropriate response. Pt was started on empiric zosyn for cholecystitis. She continued dialysis as per her schedule and was followed by the nephrology team. She was seen by hepatology for evaluation of gastric varices. U/S doppler abdomen did not show portal or splenic vein thrombosis. Patient deemed not to have cirrhosis. HIDA scan was positive for acute cholecystitis. MRCP showed gallbladder wall thickening with suspicious for acute cholecystitis. Given the patient's varices and active cholecystitis, general and transplant surgery decided against inpatient operation and would like patient to follow up outpatient. The patient will be discharged with augmentin to finish a 10 day course of antibiotics. She was medically optimized at time of discharge. 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 States pain has been ongoing for past 2 weeks, and has been in ER 3 times over this time. [place] chills last night, noted to be febrile this morning. Nausea and few episodes of nbnb emesis this AM.    ED Course:  Febrile to 100.5. Normal HR and BP. Pt was given tylenol, zosyn, and blood cultures obtained. U/S abdomen showed no acute pathology, CBD stent with pneumobilia, marked splenomegaly with upper abdominal varices. CT CA/P with contrast showed the same. No imaging evidence of cirrhosis. The patient was also anemic to hemoglobin 7.0, which was below baseline.     Hospital course:   She was given 2 units PRBC for anemia with appropriate response. Pt was started on empiric zosyn for cholecystitis. She continued dialysis as per her schedule and was followed by the nephrology team. She was seen by hepatology for evaluation of gastric varices. U/S doppler abdomen did not show portal or splenic vein thrombosis. Patient deemed not to have cirrhosis. HIDA scan was positive for acute cholecystitis. MRCP showed gallbladder wall thickening with suspicious for acute cholecystitis. Given the patient's varices and active cholecystitis, general and transplant surgery decided against inpatient operation and would like patient to follow up outpatient. The patient will be discharged with augmentin to finish a 10 day course of antibiotics. Phoslo held at time of discharge. She was medically optimized at time of discharge. 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 States pain has been ongoing for past 2 weeks, and has been in ER 3 times over this time. [place] chills last night, noted to be febrile this morning. Nausea and few episodes of nbnb emesis this AM.    ED Course:  Febrile to 100.5. Normal HR and BP. Pt was given tylenol, zosyn, and blood cultures obtained. U/S abdomen showed no acute pathology, CBD stent with pneumobilia, marked splenomegaly with upper abdominal varices. CT CA/P with contrast showed the same. No imaging evidence of cirrhosis. The patient was also anemic to hemoglobin 7.0, which was below baseline.     Hospital course:   She was given 2 units PRBC for anemia with appropriate response. Pt was started on empiric zosyn for cholecystitis. She continued dialysis as per her schedule and was followed by the nephrology team. She was seen by hepatology for evaluation of gastric varices. U/S doppler abdomen did not show portal or splenic vein thrombosis. Patient deemed not to have cirrhosis. HIDA scan was positive for acute cholecystitis. MRCP showed gallbladder wall thickening with suspicious for acute cholecystitis. Patient then had worsening abdominal pain and given IV dilaudid. Repeat RUQ U/S at this time showed no sonographic [place] sign. Given the patient's varices and active cholecystitis, general and transplant surgery decided against inpatient operation and would like patient to follow up outpatient.     Pt’s pain improved with bowel rest and fluids (possible that she had pancreatitis). She tolerated solid diet fairly well but still had some pain. She should follow with Dr. Gaspar soon after discharge to begin planning for possible cholecystectomy. PAIN MEDS UNTIL CLINIC APPT?    Note: Propranolol was switched to carvedilol. 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 States pain has been ongoing for past 2 weeks, and has been in ER 3 times over this time. [place] chills last night, noted to be febrile this morning. Nausea and few episodes of nbnb emesis this AM.    ED Course:  Febrile to 100.5. Normal HR and BP. Pt was given tylenol, zosyn, and blood cultures obtained.   U/S abdomen showed no acute pathology, CBD stent with pneumobilia, marked splenomegaly with upper abdominal varices. CT CA/P with contrast showed the same. No imaging evidence of cirrhosis.   The patient was also anemic to hemoglobin 7.0, which was below baseline.     Hospital course:   She was given 2 units PRBC for anemia with appropriate response.  Empiric zosyn started for cholecystitis. She continued dialysis as per her schedule and was followed by the nephrology team.   She was seen by hepatology for evaluation of gastric varices. U/S doppler abdomen did not show portal or splenic vein thrombosis. Patient deemed not to have cirrhosis.   HIDA scan was positive for acute cholecystitis.  MRCP showed gallbladder wall thickening with suspicious for acute cholecystitis.   Worsening abdominal pain   Underwent open cholecystectomy on 10/12/22. Found to have purulent cholecystitis. OR bile fluid cultures grew Klebsiella and Candida Tropicalis.    ID consulted. Started on Caspofungin and continued on Zosyn. Changed to Fluconazole to go thru 11/10/522 and Augmentin  to go thru 10/28/22 for discharge.   Post-operative recovery was uneventful  10/17 Underwent ERCP and successful removal of metal stent (placed on 9/22/22 for choledocholithiasis c/b post-ERCP pancreatitis)  She was ambulating and tolerating a regular diet. + bowel function  Pain was well controlled      She was followed closely by our multidisciplinary transplant team:  Surgeons, Hepatologists, ID, Nephrologists, Pharmacists, [place], ACPs, RNs, SW, Coordinators, Dieticians, PT/OT and was deemed safe for discharge home:   MEDS:   Augmentin 500mg po qd to go thru 10/28/22  Fluconazole to go thru 11/10/522 and Augmentin     ======================  Follow-up:    1. Please follow up with Dr. Dagher next week

## 2022-10-05 NOTE — DISCHARGE NOTE PROVIDER - NSDCFUADDAPPT_GEN_ALL_CORE_FT
Follow-up with Dr. Dagher at the Transplant clinic at [phone]  Follow-up with your Nephrologist after discharge and continue dialysis on your normal schedule

## 2022-10-05 NOTE — PROGRESS NOTE ADULT - PROBLEM SELECTOR PLAN 1
- Last admission pt with cholelithiasis and choledocholithiasis. Had ERCP with stone removal c/b post procedure pancreatitis that delayed surgery. Cholelithiasis and a focus of antidependent air seen on CT. Gallbladder wall thickening/edema on CT from 9/26.   - HIDA scan positive for cholecystitis --> transplant and general surgery notified.   - Lipase also elevated at 257.  - Patient without elevated WBC but has myelofibrosis. Febrile. Blood cultures sent. Empiric zosyn 9/30-->10/7  - Lactate 0.5 no concern for mesenteric ischemia  - Low fat diet.  - MRCP with mildly distended gallbladder with nonspecific diffuse wall thickening and   air from instrumentation - Last admission pt with cholelithiasis and choledocholithiasis. Had ERCP with stone removal c/b post procedure pancreatitis that delayed surgery. Cholelithiasis and a focus of antidependent air seen on CT. Gallbladder wall thickening/edema on CT from 9/26.   - HIDA scan positive for cholecystitis --> transplant and general surgery notified.   - Lipase also elevated at 257.  - Patient without elevated WBC but has myelofibrosis. Febrile. Blood cultures sent. Empiric zosyn 9/30-->10/7  - Lactate 0.5 no concern for mesenteric ischemia  - Low fat diet.  - MRCP with mildly distended gallbladder with nonspecific diffuse wall thickening and   air from instrumentation  - Given high surgical risk, no operation inpatient and patient should follow with general and transplant surgery

## 2022-10-06 LAB
ALBUMIN SERPL ELPH-MCNC: 3.2 G/DL — LOW (ref 3.3–5)
ALP SERPL-CCNC: 67 U/L — SIGNIFICANT CHANGE UP (ref 40–120)
ALT FLD-CCNC: 10 U/L — SIGNIFICANT CHANGE UP (ref 10–45)
ANION GAP SERPL CALC-SCNC: 12 MMOL/L — SIGNIFICANT CHANGE UP (ref 5–17)
AST SERPL-CCNC: 16 U/L — SIGNIFICANT CHANGE UP (ref 10–40)
BASOPHILS # BLD AUTO: 0.12 K/UL — SIGNIFICANT CHANGE UP (ref 0–0.2)
BASOPHILS NFR BLD AUTO: 1.5 % — SIGNIFICANT CHANGE UP (ref 0–2)
BILIRUB SERPL-MCNC: 0.8 MG/DL — SIGNIFICANT CHANGE UP (ref 0.2–1.2)
BUN SERPL-MCNC: 17 MG/DL — SIGNIFICANT CHANGE UP (ref 7–23)
CALCIUM SERPL-MCNC: 7.9 MG/DL — LOW (ref 8.4–10.5)
CHLORIDE SERPL-SCNC: 96 MMOL/L — SIGNIFICANT CHANGE UP (ref 96–108)
CO2 SERPL-SCNC: 25 MMOL/L — SIGNIFICANT CHANGE UP (ref 22–31)
CREAT SERPL-MCNC: 4.02 MG/DL — HIGH (ref 0.5–1.3)
EGFR: 12 ML/MIN/1.73M2 — LOW
EOSINOPHIL # BLD AUTO: 0.2 K/UL — SIGNIFICANT CHANGE UP (ref 0–0.5)
EOSINOPHIL NFR BLD AUTO: 2.5 % — SIGNIFICANT CHANGE UP (ref 0–6)
GLUCOSE SERPL-MCNC: 151 MG/DL — HIGH (ref 70–99)
HCT VFR BLD CALC: 31.2 % — LOW (ref 34.5–45)
HGB BLD-MCNC: 9.4 G/DL — LOW (ref 11.5–15.5)
IMM GRANULOCYTES NFR BLD AUTO: 9 % — HIGH (ref 0–0.9)
LIDOCAIN IGE QN: 168 U/L — HIGH (ref 7–60)
LYMPHOCYTES # BLD AUTO: 1.1 K/UL — SIGNIFICANT CHANGE UP (ref 1–3.3)
LYMPHOCYTES # BLD AUTO: 13.8 % — SIGNIFICANT CHANGE UP (ref 13–44)
MAGNESIUM SERPL-MCNC: 2.1 MG/DL — SIGNIFICANT CHANGE UP (ref 1.6–2.6)
MCHC RBC-ENTMCNC: 29.6 PG — SIGNIFICANT CHANGE UP (ref 27–34)
MCHC RBC-ENTMCNC: 30.1 GM/DL — LOW (ref 32–36)
MCV RBC AUTO: 98.1 FL — SIGNIFICANT CHANGE UP (ref 80–100)
MONOCYTES # BLD AUTO: 0.4 K/UL — SIGNIFICANT CHANGE UP (ref 0–0.9)
MONOCYTES NFR BLD AUTO: 5 % — SIGNIFICANT CHANGE UP (ref 2–14)
NEUTROPHILS # BLD AUTO: 5.46 K/UL — SIGNIFICANT CHANGE UP (ref 1.8–7.4)
NEUTROPHILS NFR BLD AUTO: 68.2 % — SIGNIFICANT CHANGE UP (ref 43–77)
NRBC # BLD: 1 /100 WBCS — HIGH (ref 0–0)
PHOSPHATE SERPL-MCNC: 1.6 MG/DL — LOW (ref 2.5–4.5)
PLATELET # BLD AUTO: 137 K/UL — LOW (ref 150–400)
POTASSIUM SERPL-MCNC: 4.1 MMOL/L — SIGNIFICANT CHANGE UP (ref 3.5–5.3)
POTASSIUM SERPL-SCNC: 4.1 MMOL/L — SIGNIFICANT CHANGE UP (ref 3.5–5.3)
PROT SERPL-MCNC: 6.2 G/DL — SIGNIFICANT CHANGE UP (ref 6–8.3)
RBC # BLD: 3.18 M/UL — LOW (ref 3.8–5.2)
RBC # FLD: 19 % — HIGH (ref 10.3–14.5)
SODIUM SERPL-SCNC: 133 MMOL/L — LOW (ref 135–145)
WBC # BLD: 8 K/UL — SIGNIFICANT CHANGE UP (ref 3.8–10.5)
WBC # FLD AUTO: 8 K/UL — SIGNIFICANT CHANGE UP (ref 3.8–10.5)

## 2022-10-06 PROCEDURE — 76705 ECHO EXAM OF ABDOMEN: CPT | Mod: 26

## 2022-10-06 PROCEDURE — 99232 SBSQ HOSP IP/OBS MODERATE 35: CPT | Mod: GC

## 2022-10-06 PROCEDURE — 99233 SBSQ HOSP IP/OBS HIGH 50: CPT | Mod: GC

## 2022-10-06 RX ORDER — HYDROMORPHONE HYDROCHLORIDE 2 MG/ML
1 INJECTION INTRAMUSCULAR; INTRAVENOUS; SUBCUTANEOUS ONCE
Refills: 0 | Status: DISCONTINUED | OUTPATIENT
Start: 2022-10-06 | End: 2022-10-06

## 2022-10-06 RX ORDER — HYDROMORPHONE HYDROCHLORIDE 2 MG/ML
0.4 INJECTION INTRAMUSCULAR; INTRAVENOUS; SUBCUTANEOUS EVERY 4 HOURS
Refills: 0 | Status: DISCONTINUED | OUTPATIENT
Start: 2022-10-06 | End: 2022-10-07

## 2022-10-06 RX ORDER — SODIUM CHLORIDE 9 MG/ML
500 INJECTION, SOLUTION INTRAVENOUS
Refills: 0 | Status: DISCONTINUED | OUTPATIENT
Start: 2022-10-06 | End: 2022-10-07

## 2022-10-06 RX ORDER — HYDROMORPHONE HYDROCHLORIDE 2 MG/ML
0.4 INJECTION INTRAMUSCULAR; INTRAVENOUS; SUBCUTANEOUS ONCE
Refills: 0 | Status: DISCONTINUED | OUTPATIENT
Start: 2022-10-06 | End: 2022-10-06

## 2022-10-06 RX ADMIN — DANAZOL 200 MILLIGRAM(S): 200 CAPSULE ORAL at 06:09

## 2022-10-06 RX ADMIN — HYDROMORPHONE HYDROCHLORIDE 0.4 MILLIGRAM(S): 2 INJECTION INTRAMUSCULAR; INTRAVENOUS; SUBCUTANEOUS at 11:12

## 2022-10-06 RX ADMIN — PIPERACILLIN AND TAZOBACTAM 25 GRAM(S): 4; .5 INJECTION, POWDER, LYOPHILIZED, FOR SOLUTION INTRAVENOUS at 15:32

## 2022-10-06 RX ADMIN — HYDROMORPHONE HYDROCHLORIDE 1 MILLIGRAM(S): 2 INJECTION INTRAMUSCULAR; INTRAVENOUS; SUBCUTANEOUS at 02:58

## 2022-10-06 RX ADMIN — PIPERACILLIN AND TAZOBACTAM 25 GRAM(S): 4; .5 INJECTION, POWDER, LYOPHILIZED, FOR SOLUTION INTRAVENOUS at 00:27

## 2022-10-06 RX ADMIN — CARVEDILOL PHOSPHATE 3.12 MILLIGRAM(S): 80 CAPSULE, EXTENDED RELEASE ORAL at 17:19

## 2022-10-06 RX ADMIN — CHLORHEXIDINE GLUCONATE 1 APPLICATION(S): 213 SOLUTION TOPICAL at 13:05

## 2022-10-06 RX ADMIN — DANAZOL 200 MILLIGRAM(S): 200 CAPSULE ORAL at 13:04

## 2022-10-06 RX ADMIN — CARVEDILOL PHOSPHATE 3.12 MILLIGRAM(S): 80 CAPSULE, EXTENDED RELEASE ORAL at 06:09

## 2022-10-06 RX ADMIN — HYDROMORPHONE HYDROCHLORIDE 0.4 MILLIGRAM(S): 2 INJECTION INTRAMUSCULAR; INTRAVENOUS; SUBCUTANEOUS at 10:57

## 2022-10-06 RX ADMIN — Medication 1 TABLET(S): at 13:04

## 2022-10-06 RX ADMIN — HYDROMORPHONE HYDROCHLORIDE 1 MILLIGRAM(S): 2 INJECTION INTRAMUSCULAR; INTRAVENOUS; SUBCUTANEOUS at 02:28

## 2022-10-06 RX ADMIN — SODIUM CHLORIDE 75 MILLILITER(S): 9 INJECTION, SOLUTION INTRAVENOUS at 13:05

## 2022-10-06 RX ADMIN — DANAZOL 200 MILLIGRAM(S): 200 CAPSULE ORAL at 21:06

## 2022-10-06 NOTE — PROGRESS NOTE ADULT - SUBJECTIVE AND OBJECTIVE BOX
Gastroenterology/Hepatology Progress Note    Interval Events: Patient continues to have abdominal pain today. Pt states she ate full dinner last evening but then developed RUQ pain again at 1 am. Again worse after breakfast today. IR consulted this am for perc vikas.    Allergies:  No Known Allergies    Hospital Medications:  aluminum hydroxide/magnesium hydroxide/simethicone Suspension 30 milliLiter(s) Oral every 4 hours PRN  carvedilol 3.125 milliGRAM(s) Oral every 12 hours  chlorhexidine 2% Cloths 1 Application(s) Topical daily  danazol 200 milliGRAM(s) Oral three times a day  epoetin filiberto-epbx (RETACRIT) Injectable 83645 Unit(s) IV Push <User Schedule>  HYDROmorphone  Injectable 0.4 milliGRAM(s) IV Push once  HYDROmorphone  Injectable 0.4 milliGRAM(s) IV Push every 4 hours PRN  lactated ringers. 500 milliLiter(s) IV Continuous <Continuous>  multivitamin 1 Tablet(s) Oral daily  Nephro-deonna 1 Tablet(s) Oral daily  piperacillin/tazobactam IVPB.. 3.375 Gram(s) IV Intermittent every 12 hours  ruxolitinib 10 milliGRAM(s) Oral <User Schedule>    ROS: 14 point ROS negative unless otherwise state in subjective    PHYSICAL EXAM:   Vital Signs:  Vital Signs Last 24 Hrs  T(C): 36.6 (06 Oct 2022 11:11), Max: 36.8 (06 Oct 2022 04:00)  T(F): 97.8 (06 Oct 2022 11:11), Max: 98.3 (06 Oct 2022 04:00)  HR: 61 (06 Oct 2022 11:11) (61 - 75)  BP: 132/82 (06 Oct 2022 11:11) (117/65 - 138/68)  BP(mean): --  RR: 18 (06 Oct 2022 11:11) (18 - 18)  SpO2: 97% (06 Oct 2022 11:11) (96% - 97%)    Parameters below as of 06 Oct 2022 11:11  Patient On (Oxygen Delivery Method): room air    Daily     Daily     GENERAL:  No acute distress  HEENT:  NCAT, no scleral icterus  CHEST: no resp distress  HEART:  RRR  ABDOMEN:  Soft, mild TTP in RUQ  EXTREMITIES:  No cyanosis, clubbing, or edema  SKIN:  No rash/erythema/ecchymoses  NEURO:  Alert and oriented x 3     LABS:                        9.4    8.00  )-----------( 137      ( 06 Oct 2022 06:19 )             31.2     Mean Cell Volume: 98.1 fl (10-06-22 @ 06:19)    10-06    133<L>  |  96  |  17  ----------------------------<  151<H>  4.1   |  25  |  4.02<H>    Ca    7.9<L>      06 Oct 2022 06:12  Phos  1.6     10-06  Mg     2.1     10-06    TPro  6.2  /  Alb  3.2<L>  /  TBili  0.8  /  DBili  x   /  AST  16  /  ALT  10  /  AlkPhos  67  10-06    LIVER FUNCTIONS - ( 06 Oct 2022 06:12 )  Alb: 3.2 g/dL / Pro: 6.2 g/dL / ALK PHOS: 67 U/L / ALT: 10 U/L / AST: 16 U/L / GGT: x               Amylase Serum--      Lipase [id number]       Ammonia--    Imaging:  reviewed

## 2022-10-06 NOTE — PROGRESS NOTE ADULT - PROBLEM SELECTOR PLAN 2
- MWF via AVF left arm  - Nephrology following. EPO post dialysis  - Jakafi post dialysis. Nephro-deonna. Holding calcium acetate due to low phos.   - Renal diet

## 2022-10-06 NOTE — PROGRESS NOTE ADULT - ASSESSMENT
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 Briefly, patient known to have gastric varices since 2014 on imaging. s/p liver biopsy w/ elevated portosystemic gradient of 9 with histology demonstrating nodular regenerative hyperplasia but no fibrosis. Underwent EGD in 2021 with gastric varices (IGV1 and IGV2) not amenable to endoscopic therapy. Patient had recent ERCP done on 9/22/22 for choledocholithiasis s/p metal stent.     #Hx of Choledocholithiasis s/p ERCP on 9/22/22 with metal stent complicated with pancreatitis.   -Patient with normal Bilirubin demonstrating patent stent. Suspect ongoing abdominal pain biliary colic pain ?gallbladder vs ? pancreatitis from last week.   - MRCP w/ possible acute vikas, hepatic microabscesses. No plan for surgical intervention inpatient.   #Nodular regenerative hyperplasia   #noncirrhotic portal HTN   #Gastric varices, size seem stable in ERCP compared to last EGD in 1/2021.     Recommendations:  - f/u abdominal ultrasound for further evaluation of GB  -c/w Coreg 3.125 mg BID  -Low fat diet  -c/w abx    All recommendations are tentative until note is attested by attending.     Nurys Link, PGY5  Gastroenterology/Hepatology Fellow  Available on Microsoft Teams  [phone] ([place] Short Range Pager)  [phone] (Long Range Pager)    After 5pm, please contact the on-call GI fellow. [phone]

## 2022-10-06 NOTE — PROGRESS NOTE ADULT - ASSESSMENT
63 yo F hx of myelofibrosis, polycythemia vera, ESRD on MFW schedule via LUE AVF (last dialysis W), portal HTN, subclavian port for transfusions, gastric varices, gallstones, recent ERCP for choledocolithiasis s/p biliary stent, c/b post ERCP pancreatitis now presenting with fever and RUQ and epigastric pain. NM Hepatobiliary study positive for acute cholecystitis. MRCP with likely acute vikas. 61 yo F hx of myelofibrosis, polycythemia vera, ESRD on MFW schedule via LUE AVF (last dialysis W), portal HTN, subclavian port for transfusions, gastric varices, gallstones, recent ERCP for choledocolithiasis s/p biliary stent, c/b post ERCP pancreatitis now presenting with fever and RUQ and epigastric pain. NM Hepatobiliary study positive for acute cholecystitis. MRCP + for acute vikas. However, US RUQ not consistent with acute cholecystitis. May likely have pancreatitis given elevated lipase and severe abdominal pain.

## 2022-10-06 NOTE — PROGRESS NOTE ADULT - PROBLEM SELECTOR PLAN 3
- Bi monthly transfusions at [facility]. Followed by Dr. Benjy Guo  - US doppler abdomen no portal or splenic vein thrombosis.  - Liver biopsy with nodular regenerative hyperplasia  - Hgb 7.0 baseline high 8's/low 9's based on outpatient labs  - Continue Danazol  - Heme consult  - s/p 2unit PRBC - Bi monthly transfusions at [facility]. Followed by Dr. Benjy Guo  - US doppler abdomen no portal or splenic vein thrombosis.  - US RUQ with no acute cholecystitis detected on 10/5  - Liver biopsy with nodular regenerative hyperplasia  - Hgb 7.0 baseline high 8's/low 9's based on outpatient labs  - Continue Danazol  - Heme consult  - s/p 2unit PRBC early in admission

## 2022-10-06 NOTE — PROGRESS NOTE ADULT - ATTENDING COMMENTS
61 yo F hx of myelofibrosis, polycythemia vera, ESRD on MFW schedule via LUE AVF (last dialysis W), portal HTN, subclavian port for transfusions, gastric varices, gallstones, recent ERCP for choledocholithiasis s/p biliary stent, c/b post ERCP pancreatitis + pneumobilia now presenting with fever and RUQ and epigastric pain.    #RUQ and epigastric abdominal pain  #?pancreatitis  #ESRD  #gastric varices/portal HTN  #myelofibrosis  #PCV  #hx of ERCP pancreatitis  - patient was seen and examined at bedside, hx of ERCP induced pancreatitis  - patient today looked distressed, writhing in pain when approached by bedside  - she described 10/10 pain, especially after food intake  - RUQ tenderness to palpation, worse today, also some tenderness in epigastrium  - repeat US abdomen ordered, repeat lipase 168 down to 257  - CTAP showing normal liver contour, splenomegaly, and pneumobilia(previously seen on imaging)  - HIDA scan and MRCP positive for acute cholecystitis  - transplant surgery/trauma surgery consulted, no acute surgical intervention given comorbidities  - plan for outpatient elective cholecystectomy after completion of antibiotics  - IR consulted today for possible percutaneous vikas; however after speaking with resident, low suspicion for acute cholecystitis   - possibly pancreatitis? she recently received dialysis last night, and her pain worsened, started on IVF 500cc today, and made NPO  - pain control with Dilaudid PRN   - requested hepatology to re-evaluate patient due to worsening pain  - on Zosyn empirically, blood culture negative to date, will continue for now  - will switch over to Augmentin PO to complete 10 days of antibiotics  - dispo pending repeat hepatology evaluation. 61 yo F hx of myelofibrosis, polycythemia vera, ESRD on MFW schedule via LUE AVF (last dialysis W), portal HTN, subclavian port for transfusions, gastric varices, gallstones, recent ERCP for choledocholithiasis s/p biliary stent, c/b post ERCP pancreatitis + pneumobilia now presenting with fever and RUQ and epigastric pain.    #RUQ and epigastric abdominal pain  #?pancreatitis  #ESRD  #gastric varices/portal HTN  #myelofibrosis  #PCV  #hx of ERCP pancreatitis  - patient was seen and examined at bedside, hx of ERCP induced pancreatitis  - patient today looked distressed, writhing in pain when approached by bedside  - she described 10/10 pain, especially after food intake  - RUQ tenderness to palpation, worse today, also some tenderness in epigastrium  - repeat US abdomen ordered, repeat lipase 168 down to 257  - CTAP showing normal liver contour, splenomegaly, and pneumobilia(previously seen on imaging)  - HIDA scan and MRCP positive for acute cholecystitis  - transplant surgery/trauma surgery consulted, no acute surgical intervention given comorbidities  - plan for outpatient elective cholecystectomy after completion of antibiotics  - IR consulted today for possible percutaneous vikas; however after speaking with resident, low suspicion for acute cholecystitis   - possibly pancreatitis? she recently received dialysis last night, and her pain worsened, started on IVF 500cc today, and made NPO  - pain control with Dilaudid PRN   - requested hepatology to re-evaluate patient due to worsening pain  - on Zosyn empirically, blood culture negative to date, will continue for now  - dispo pending repeat hepatology evaluation.

## 2022-10-06 NOTE — CONSULT NOTE ADULT - ASSESSMENT
Interventional Radiology    Evaluate for Procedure: Percutaneous cholecystostomy    HPI: 62y Female with myelofibrosis, polycythemia vera, ESRD on HD via AVF, portal HTN s/p ERCP and stent 9/22 for cholelthiaisis c/b pancreatitis with continued abdominal pain. NM and MRCP with concern for cholecystitis. IR consulted for percutaneous cholecystostomy.     Allergies:   Medications (Abx/Cardiac/Anticoagulation/Blood Products)    carvedilol: 3.125 milliGRAM(s) Oral (10-06 @ 06:09)  piperacillin/tazobactam IVPB..: 25 mL/Hr IV Intermittent (10-04 @ 12:28)  piperacillin/tazobactam IVPB..: 25 mL/Hr IV Intermittent (10-06 @ 00:27)    Data:    T(C): 36.6  HR: 61  BP: 132/82  RR: 18  SpO2: 97%    -WBC 8.00 / HgB 9.4 / Hct 31.2 / Plt 137  -Na 133 / Cl 96 / BUN 17 / Glucose 151  -K 4.1 / CO2 25 / Cr 4.02  -ALT 10 / Alk Phos 67 / T.Bili 0.8  -INR 1.16 / PTT --      Radiology:   Relevant imaging including NM HIDA and MRI abdomen reviewed with attending interventional radiologist. Findings may be related to post ERCP state without dedicated view of the gallbladder on recent US.\    Assessment/Plan:   62y Female with myelofibrosis, polycythemia vera, ESRD on HD via AVF, portal HTN s/p ERCP and stent 9/22 for cholelthiaisis c/b pancreatitis with continued abdominal pain. NM and MRCP with concern for cholecystitis. IR consulted for percutaneous cholecystostomy.  At this time, patient afebrile, without leukocytosis, and without overt signs of cholecystitis.     No role for percutaneous cholecystostomy at this time.   Consider repeat ultrasound of RUQ for further evaluation of the gallbladder.    --  Julio C Manjarrez, PGY-3  Vascular and Interventional Radiology   Available on Microsoft Teams    - Non-emergent consults: Place IR consult order in [place]  - Emergent issues (pager): [facility] [phone]; [facility] [phone]; [phone]  - Scheduling questions: [facility] [phone]; [facility] [phone]  - Clinic/outpatient booking: [place] [id number] [phone]; [facility] [phone] Interventional Radiology    Evaluate for Procedure: Percutaneous cholecystostomy    HPI: 62y Female with myelofibrosis, polycythemia vera, ESRD on HD via AVF, portal HTN s/p ERCP and stent 9/22 for cholelthiaisis c/b pancreatitis with continued abdominal pain. NM and MRCP with concern for cholecystitis. IR consulted for percutaneous cholecystostomy.     Allergies:   Medications (Abx/Cardiac/Anticoagulation/Blood Products)    carvedilol: 3.125 milliGRAM(s) Oral (10-06 @ 06:09)  piperacillin/tazobactam IVPB..: 25 mL/Hr IV Intermittent (10-04 @ 12:28)  piperacillin/tazobactam IVPB..: 25 mL/Hr IV Intermittent (10-06 @ 00:27)    Data:    T(C): 36.6  HR: 61  BP: 132/82  RR: 18  SpO2: 97%    -WBC 8.00 / HgB 9.4 / Hct 31.2 / Plt 137  -Na 133 / Cl 96 / BUN 17 / Glucose 151  -K 4.1 / CO2 25 / Cr 4.02  -ALT 10 / Alk Phos 67 / T.Bili 0.8  -INR 1.16 / PTT --      Radiology:   Relevant imaging including NM HIDA and MRI abdomen reviewed with attending interventional radiologist. Findings may be related to post ERCP state without dedicated view of the gallbladder on recent US.\    Assessment/Plan:   62y Female with myelofibrosis, polycythemia vera, ESRD on HD via AVF, portal HTN s/p ERCP and stent 9/22 for cholelthiaisis c/b pancreatitis with continued abdominal pain. NM and MRCP with concern for cholecystitis. IR consulted for percutaneous cholecystostomy.  At this time, patient afebrile, without leukocytosis, and without overt signs of cholecystitis.     No role for percutaneous cholecystostomy at this time.   Consider repeat ultrasound of RUQ for further evaluation of the gallbladder.      --  Julio C Manjarrez, PGY-3  Vascular and Interventional Radiology   Available on [employer] Teams    - Non-emergent consults: Place IR consult order in [place]  - Emergent issues (pager): [facility] [phone]; [facility] [phone]; [phone]  - Scheduling questions: [facility] [phone]; [facility] [phone]  - Clinic/outpatient booking: [place] [address] [phone]; [facility] [phone]

## 2022-10-06 NOTE — PROGRESS NOTE ADULT - SUBJECTIVE AND OBJECTIVE BOX
Patient is a 62y old  Female who presents with a chief complaint of Abdominal pain (05 Oct 2022 16:48)     INTERVAL HPI/OVERNIGHT EVENTS:  - No acute events overnight    SUBJECTIVE  - Patient seen and evaluated at bedside  - Patient reports presence of   - Patient reports absence of fevers, chills, HA, lightheadedness, dizziness, nausea, emesis, chest pain, dyspnea, palpitations, abd pain, diarrhea, urinary symptoms, skin color changes or rashes, or LE edema     MEDICATIONS  (STANDING):  carvedilol 3.125 milliGRAM(s) Oral every 12 hours  chlorhexidine 2% Cloths 1 Application(s) Topical daily  danazol 200 milliGRAM(s) Oral three times a day  epoetin filiberto-epbx (RETACRIT) Injectable 84819 Unit(s) IV Push <User Schedule>  multivitamin 1 Tablet(s) Oral daily  Nephro-deonna 1 Tablet(s) Oral daily  piperacillin/tazobactam IVPB.. 3.375 Gram(s) IV Intermittent every 12 hours  ruxolitinib 10 milliGRAM(s) Oral <User Schedule>    MEDICATIONS  (PRN):  aluminum hydroxide/magnesium hydroxide/simethicone Suspension 30 milliLiter(s) Oral every 4 hours PRN Dyspepsia    Allergies:  No Known Allergies    Intolerances:      REVIEW OF SYSTEMS: As indicated above; otherwise, negative    VITAL SIGNS:  T(F): 98.3 (10-06-22 @ 04:00), Max: 98.3 (10-06-22 @ 04:00)  HR: 63 (10-06-22 @ 04:00) (60 - 75)  BP: 117/65 (10-06-22 @ 04:00) (117/65 - 142/73)  RR: 18 (10-06-22 @ 04:00) (18 - 18)  SpO2: 96% (10-06-22 @ 04:00) (95% - 100%)    PHYSICAL EXAM:  General: NAD, well-groomed, well-developed  Eyes: Conjunctiva and sclera clear  ENMT: Moist mucous membranes  Neck: No palpable pre-auricular, post-auricular, occipital, mandibular, submental, supra-clavicular, or infra-clavicular lymph nodes   Chest: Clear to auscultation bilaterally; no rales, rhonchi, or wheezing  Heart: Regular rate and rhythm; normal S1 and S2; no murmurs, rubs, or gallops  Abd: Soft, nontender, nondistended  Nervous System: AAOX3  Psych: Appropriate affect  Ext: no peripheral LE edema bilaterally    LABS:                        9.4    8.00  )-----------( 137      ( 06 Oct 2022 06:19 )             31.2     06 Oct 2022 06:12    133    |  96     |  17     ----------------------------<  151    4.1     |  25     |  4.02     Ca    7.9        06 Oct 2022 06:12  Phos  1.6       06 Oct 2022 06:12  Mg     2.1       06 Oct 2022 06:12    TPro  6.2    /  Alb  3.2    /  TBili  0.8    /  DBili  x      /  AST  16     /  ALT  10     /  AlkPhos  67     06 Oct 2022 06:12    CAPILLARY BLOOD GLUCOSE        BLOOD CULTURE    RADIOLOGY & ADDITIONAL TESTS:    Imaging Personally Reviewed:  [X ] YES     Consultant(s) Notes Reviewed:  Yes    Care Discussed with Consultants/Other Providers: Yes Patient is a 62y old  Female who presents with a chief complaint of Abdominal pain (05 Oct 2022 16:48)     INTERVAL HPI/OVERNIGHT EVENTS:  - No acute events overnight    SUBJECTIVE  - Patient seen and evaluated at bedside  - Patient reports presence of 10/10 RUQ/epigastric pain  - Patient reports absence of fevers, chills, HA, lightheadedness, dizziness, nausea, emesis, chest pain, dyspnea, palpitations, urinary symptoms, or LE edema     MEDICATIONS  (STANDING):  carvedilol 3.125 milliGRAM(s) Oral every 12 hours  chlorhexidine 2% Cloths 1 Application(s) Topical daily  danazol 200 milliGRAM(s) Oral three times a day  epoetin filiberto-epbx (RETACRIT) Injectable 00703 Unit(s) IV Push <User Schedule>  multivitamin 1 Tablet(s) Oral daily  Nephro-deonna 1 Tablet(s) Oral daily  piperacillin/tazobactam IVPB.. 3.375 Gram(s) IV Intermittent every 12 hours  ruxolitinib 10 milliGRAM(s) Oral <User Schedule>    MEDICATIONS  (PRN):  aluminum hydroxide/magnesium hydroxide/simethicone Suspension 30 milliLiter(s) Oral every 4 hours PRN Dyspepsia    Allergies:  No Known Allergies    Intolerances:      REVIEW OF SYSTEMS: As indicated above; otherwise, negative    VITAL SIGNS:  T(F): 98.3 (10-06-22 @ 04:00), Max: 98.3 (10-06-22 @ 04:00)  HR: 63 (10-06-22 @ 04:00) (60 - 75)  BP: 117/65 (10-06-22 @ 04:00) (117/65 - 142/73)  RR: 18 (10-06-22 @ 04:00) (18 - 18)  SpO2: 96% (10-06-22 @ 04:00) (95% - 100%)    PHYSICAL EXAM:  General: NAD; appears uncomfortable  Neck: No palpable pre-auricular, post-auricular, occipital, mandibular, submental, supra-clavicular, or infra-clavicular lymph nodes   Chest: Clear to auscultation bilaterally; no rales, rhonchi, or wheezing  Heart: Regular rate and rhythm; S1 and S2 intact; no obvious murmurs, rubs, or gallops  Abd: Soft, RUQ tenderness on light and deep palpation with positive Arguello sign, nondistended  Nervous System: A&O to situation  Psych: Appropriate affect  Ext: no peripheral LE edema bilaterally    LABS:                        9.4    8.00  )-----------( 137      ( 06 Oct 2022 06:19 )             31.2     06 Oct 2022 06:12    133    |  96     |  17     ----------------------------<  151    4.1     |  25     |  4.02     Ca    7.9        06 Oct 2022 06:12  Phos  1.6       06 Oct 2022 06:12  Mg     2.1       06 Oct 2022 06:12    TPro  6.2    /  Alb  3.2    /  TBili  0.8    /  DBili  x      /  AST  16     /  ALT  10     /  AlkPhos  67     06 Oct 2022 06:12    CAPILLARY BLOOD GLUCOSE        BLOOD CULTURE    RADIOLOGY & ADDITIONAL TESTS:    Imaging Personally Reviewed:  [X ] YES     Consultant(s) Notes Reviewed:  Yes    Care Discussed with Consultants/Other Providers: Yes

## 2022-10-06 NOTE — PROGRESS NOTE ADULT - PROBLEM SELECTOR PLAN 4
- No signs or history of bleeding  - EGD in 2021 with gastric varices (IGV1 and IGV2) not amenable to endoscopic therapy  - Switch propranolol to carvedilol 3.125q12 - No signs or history of bleeding  - EGD in 2021 with gastric varices (IGV1 and IGV2) not amenable to endoscopic therapy  - Continue with carvedilol 3.125q12

## 2022-10-07 LAB
ALBUMIN SERPL ELPH-MCNC: 3.2 G/DL — LOW (ref 3.3–5)
ALP SERPL-CCNC: 58 U/L — SIGNIFICANT CHANGE UP (ref 40–120)
ALT FLD-CCNC: 11 U/L — SIGNIFICANT CHANGE UP (ref 10–45)
ANION GAP SERPL CALC-SCNC: 13 MMOL/L — SIGNIFICANT CHANGE UP (ref 5–17)
AST SERPL-CCNC: 15 U/L — SIGNIFICANT CHANGE UP (ref 10–40)
BASOPHILS # BLD AUTO: 0.15 K/UL — SIGNIFICANT CHANGE UP (ref 0–0.2)
BASOPHILS NFR BLD AUTO: 1.7 % — SIGNIFICANT CHANGE UP (ref 0–2)
BILIRUB SERPL-MCNC: 1.2 MG/DL — SIGNIFICANT CHANGE UP (ref 0.2–1.2)
BUN SERPL-MCNC: 24 MG/DL — HIGH (ref 7–23)
CALCIUM SERPL-MCNC: 8.1 MG/DL — LOW (ref 8.4–10.5)
CHLORIDE SERPL-SCNC: 97 MMOL/L — SIGNIFICANT CHANGE UP (ref 96–108)
CO2 SERPL-SCNC: 24 MMOL/L — SIGNIFICANT CHANGE UP (ref 22–31)
CREAT SERPL-MCNC: 5.66 MG/DL — HIGH (ref 0.5–1.3)
EGFR: 8 ML/MIN/1.73M2 — LOW
EOSINOPHIL # BLD AUTO: 0.16 K/UL — SIGNIFICANT CHANGE UP (ref 0–0.5)
EOSINOPHIL NFR BLD AUTO: 1.8 % — SIGNIFICANT CHANGE UP (ref 0–6)
GLUCOSE SERPL-MCNC: 74 MG/DL — SIGNIFICANT CHANGE UP (ref 70–99)
HCT VFR BLD CALC: 30.8 % — LOW (ref 34.5–45)
HGB BLD-MCNC: 9.3 G/DL — LOW (ref 11.5–15.5)
IMM GRANULOCYTES NFR BLD AUTO: 10.2 % — HIGH (ref 0–0.9)
LYMPHOCYTES # BLD AUTO: 1.15 K/UL — SIGNIFICANT CHANGE UP (ref 1–3.3)
LYMPHOCYTES # BLD AUTO: 12.8 % — LOW (ref 13–44)
MAGNESIUM SERPL-MCNC: 2.4 MG/DL — SIGNIFICANT CHANGE UP (ref 1.6–2.6)
MCHC RBC-ENTMCNC: 29.4 PG — SIGNIFICANT CHANGE UP (ref 27–34)
MCHC RBC-ENTMCNC: 30.2 GM/DL — LOW (ref 32–36)
MCV RBC AUTO: 97.5 FL — SIGNIFICANT CHANGE UP (ref 80–100)
MONOCYTES # BLD AUTO: 0.41 K/UL — SIGNIFICANT CHANGE UP (ref 0–0.9)
MONOCYTES NFR BLD AUTO: 4.6 % — SIGNIFICANT CHANGE UP (ref 2–14)
NEUTROPHILS # BLD AUTO: 6.21 K/UL — SIGNIFICANT CHANGE UP (ref 1.8–7.4)
NEUTROPHILS NFR BLD AUTO: 68.9 % — SIGNIFICANT CHANGE UP (ref 43–77)
NRBC # BLD: 1 /100 WBCS — HIGH (ref 0–0)
PHOSPHATE SERPL-MCNC: 2.7 MG/DL — SIGNIFICANT CHANGE UP (ref 2.5–4.5)
PLATELET # BLD AUTO: 132 K/UL — LOW (ref 150–400)
POTASSIUM SERPL-MCNC: 5 MMOL/L — SIGNIFICANT CHANGE UP (ref 3.5–5.3)
POTASSIUM SERPL-SCNC: 5 MMOL/L — SIGNIFICANT CHANGE UP (ref 3.5–5.3)
PROT SERPL-MCNC: 6.2 G/DL — SIGNIFICANT CHANGE UP (ref 6–8.3)
RBC # BLD: 3.16 M/UL — LOW (ref 3.8–5.2)
RBC # FLD: 18.5 % — HIGH (ref 10.3–14.5)
SARS-COV-2 RNA SPEC QL NAA+PROBE: SIGNIFICANT CHANGE UP
SODIUM SERPL-SCNC: 134 MMOL/L — LOW (ref 135–145)
WBC # BLD: 9 K/UL — SIGNIFICANT CHANGE UP (ref 3.8–10.5)
WBC # FLD AUTO: 9 K/UL — SIGNIFICANT CHANGE UP (ref 3.8–10.5)

## 2022-10-07 PROCEDURE — 90935 HEMODIALYSIS ONE EVALUATION: CPT | Mod: GC

## 2022-10-07 PROCEDURE — 99233 SBSQ HOSP IP/OBS HIGH 50: CPT | Mod: GC

## 2022-10-07 PROCEDURE — 99232 SBSQ HOSP IP/OBS MODERATE 35: CPT | Mod: GC

## 2022-10-07 RX ORDER — HYDROMORPHONE HYDROCHLORIDE 2 MG/ML
0.4 INJECTION INTRAMUSCULAR; INTRAVENOUS; SUBCUTANEOUS EVERY 4 HOURS
Refills: 0 | Status: DISCONTINUED | OUTPATIENT
Start: 2022-10-07 | End: 2022-10-11

## 2022-10-07 RX ORDER — SENNA PLUS 8.6 MG/1
1 TABLET ORAL ONCE
Refills: 0 | Status: COMPLETED | OUTPATIENT
Start: 2022-10-07 | End: 2022-10-07

## 2022-10-07 RX ADMIN — RUXOLITINIB 10 MILLIGRAM(S): 25 TABLET ORAL at 22:09

## 2022-10-07 RX ADMIN — HYDROMORPHONE HYDROCHLORIDE 0.4 MILLIGRAM(S): 2 INJECTION INTRAMUSCULAR; INTRAVENOUS; SUBCUTANEOUS at 19:46

## 2022-10-07 RX ADMIN — Medication 1 TABLET(S): at 14:31

## 2022-10-07 RX ADMIN — DANAZOL 200 MILLIGRAM(S): 200 CAPSULE ORAL at 14:31

## 2022-10-07 RX ADMIN — CHLORHEXIDINE GLUCONATE 1 APPLICATION(S): 213 SOLUTION TOPICAL at 14:37

## 2022-10-07 RX ADMIN — SODIUM CHLORIDE 75 MILLILITER(S): 9 INJECTION, SOLUTION INTRAVENOUS at 01:12

## 2022-10-07 RX ADMIN — PIPERACILLIN AND TAZOBACTAM 25 GRAM(S): 4; .5 INJECTION, POWDER, LYOPHILIZED, FOR SOLUTION INTRAVENOUS at 14:31

## 2022-10-07 RX ADMIN — ERYTHROPOIETIN 20000 UNIT(S): 10000 INJECTION, SOLUTION INTRAVENOUS; SUBCUTANEOUS at 11:21

## 2022-10-07 RX ADMIN — PIPERACILLIN AND TAZOBACTAM 25 GRAM(S): 4; .5 INJECTION, POWDER, LYOPHILIZED, FOR SOLUTION INTRAVENOUS at 00:32

## 2022-10-07 RX ADMIN — HYDROMORPHONE HYDROCHLORIDE 0.4 MILLIGRAM(S): 2 INJECTION INTRAMUSCULAR; INTRAVENOUS; SUBCUTANEOUS at 20:46

## 2022-10-07 RX ADMIN — DANAZOL 200 MILLIGRAM(S): 200 CAPSULE ORAL at 22:09

## 2022-10-07 RX ADMIN — CARVEDILOL PHOSPHATE 3.12 MILLIGRAM(S): 80 CAPSULE, EXTENDED RELEASE ORAL at 17:06

## 2022-10-07 RX ADMIN — SENNA PLUS 1 TABLET(S): 8.6 TABLET ORAL at 14:31

## 2022-10-07 NOTE — PROGRESS NOTE ADULT - PROBLEM SELECTOR PLAN 4
- No signs or history of bleeding  - EGD in 2021 with gastric varices (IGV1 and IGV2) not amenable to endoscopic therapy  - Continue with carvedilol 3.125q12

## 2022-10-07 NOTE — PROGRESS NOTE ADULT - PROBLEM SELECTOR PLAN 1
- Last admission pt with cholelithiasis and choledocholithiasis. Had ERCP with stone removal c/b post procedure pancreatitis that delayed surgery. Cholelithiasis and a focus of antidependent air seen on CT. Gallbladder wall thickening/edema on CT from 9/26. May be acute cholecystitis and pancreatitis; currently on abx  -Currently with severe 10/10 RUQ/epigastric pain s/p dilaudid 0.4mg IVP's; continue with dilaudid IVP's as ordered  - HIDA scan positive for cholecystitis --> transplant and general surgery notified.   - Lipase also elevated at 257. Repeat today ~160's  - Patient without elevated WBC but has myelofibrosis. afebrile. BCx NGTD.   - Empiric zosyn 9/30-->10/7  - Lactate 0.5 no concern for mesenteric ischemia  - Low fat diet.  - MRCP with mildly distended gallbladder with nonspecific diffuse wall thickening and   air from instrumentation  -US RUQ indicates no acute cholecystitis 10/5  - Given high surgical risk, no operation inpatient and patient should follow with general and transplant surgery  - IR consulted today for possible percutaneous vikas; however after speaking with resident, low suspicion for acute cholecystitis  -Hepatology consulted for re-eval given patient with recurrent severe abd pain - Last admission pt with cholelithiasis and choledocholithiasis. Had ERCP with stone removal c/b post procedure pancreatitis that delayed surgery. Cholelithiasis and a focus of antidependent air seen on CT. Gallbladder wall thickening/edema on CT from 9/26. May be acute cholecystitis and pancreatitis. Abdominal pain improved today from fluids and bowel rest; currently on abx  - HIDA scan positive for cholecystitis --> transplant and general surgery notified.   - Lipase also elevated at 257. Repeat today ~160's  - Patient without elevated WBC but has myelofibrosis. afebrile. BCx NGTD.   - Empiric zosyn 9/30-->10/7  - Lactate 0.5 no concern for mesenteric ischemia  - Low fat diet.  - MRCP with mildly distended gallbladder with nonspecific diffuse wall thickening and   air from instrumentation  -US RUQ indicates no acute cholecystitis 10/5  - Given high surgical risk, no operation inpatient and patient should follow with general and transplant surgery, though pending repeat eval from Dr. Gaspar.

## 2022-10-07 NOTE — PROGRESS NOTE ADULT - SUBJECTIVE AND OBJECTIVE BOX
Transplant Surgery - Multidisciplinary Rounds  --------------------------------------------------------------  DDRT / LDRT POD#    Present:   Patient seen and examined with multidisciplinary team including Transplant Surgeon: Dr. Gaspar, NP rafy and unit RN during am rounds.  Disciplines not in attendance will be notified of the plan.     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  Hepatology called to weigh in. Briefly, patient known to have gastric varices since 2014 on imaging. s/p liver biopsy w/ elevated portosystemic gradient of 9 with histology demonstrating nodular regenerative hyperplasia but no fibrosis. Underwent EGD in 2021 with gastric varices (IGV1 and IGV2) not amenable to endoscopic therapy. Patient had recent ERCP done on 9/22/22 for choledocholithiasis s/p metal stent.     Interval Events:  Patient states pain is improved   No acute distress  no abd tenderness  VSS, Afebrile    MEDICATIONS  (STANDING):  carvedilol 3.125 milliGRAM(s) Oral every 12 hours  chlorhexidine 2% Cloths 1 Application(s) Topical daily  danazol 200 milliGRAM(s) Oral three times a day  epoetin filiberto-epbx (RETACRIT) Injectable 08551 Unit(s) IV Push <User Schedule>  multivitamin 1 Tablet(s) Oral daily  Nephro-deonna 1 Tablet(s) Oral daily  piperacillin/tazobactam IVPB.. 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      Pancreatic cyst      History of myelofibrosis      History of myelofibrosis      Peritoneal dialysis catheter in place  Placed 8/9/2017      Hemoperitoneum as complication of peritoneal dialysis  s/p removal 9/18      AV fistula  Placed 9/18          Vital Signs Last 24 Hrs  T(C): 36.5 (07 Oct 2022 12:45), Max: 36.7 (07 Oct 2022 04:32)  T(F): 97.7 (07 Oct 2022 12:45), Max: 98.1 (07 Oct 2022 04:32)  HR: 75 (07 Oct 2022 17:06) (67 - 82)  BP: 148/80 (07 Oct 2022 17:06) (128/73 - 165/79)  BP(mean): --  RR: 18 (07 Oct 2022 12:45) (18 - 19)  SpO2: 98% (07 Oct 2022 12:45) (96% - 98%)    Parameters below as of 07 Oct 2022 12:45  Patient On (Oxygen Delivery Method): room air        I&O's Summary    06 Oct 2022 07:01  -  07 Oct 2022 07:00  --------------------------------------------------------  IN: 200 mL / OUT: 0 mL / NET: 200 mL    07 Oct 2022 07:01  -  07 Oct 2022 18:03  --------------------------------------------------------  IN: 240 mL / OUT: 1000 mL / NET: -760 mL                              9.3    9.00  )-----------( 132      ( 07 Oct 2022 07:26 )             30.8     10-07    134<L>  |  97  |  24<H>  ----------------------------<  74  5.0   |  24  |  5.66<H>    Ca    8.1<L>      07 Oct 2022 07:26  Phos  2.7     10-07  Mg     2.4     10-07    TPro  6.2  /  Alb  3.2<L>  /  TBili  1.2  /  DBili  x   /  AST  15  /  ALT  11  /  AlkPhos  58  10-07        REVIEW OF SYSTEMS  --------------------------------------------------------------------------------  Gen: No weight changes, fatigue, fevers/chills, weakness  Skin: No rashes  Head/Eyes/Ears/Mouth: No headache; Normal hearing; Normal vision w/o blurriness; No sinus pain/discomfort, sore throat  Respiratory: No dyspnea, cough, wheezing, hemoptysis  CV: No chest pain, PND, orthopnea  GI: No abdominal pain, diarrhea, constipation, nausea, vomiting, melena, hematochezia  : No increased frequency, dysuria, hematuria, nocturia  MSK: No joint pain/swelling; no back pain; no edema  Neuro: No dizziness/lightheadedness, weakness, seizures, numbness, tingling  Heme: No easy bruising or bleeding  Endo: No heat/cold intolerance  Psych: No significant nervousness, anxiety, stress, depression  All other systems were reviewed and are negative, except as noted.      PHYSICAL EXAM:  Constitutional: Well developed / well nourished  Eyes: Anicteric, PERRLA  ENMT: nc/at  Neck: Supple  Respiratory: CTA B/L  Cardiovascular: RRR  Gastrointestinal: Soft abdomen, NT, ND  Genitourinary:  on HD  Extremities: SCD's in place and working bilaterally  Vascular: Palpable dp pulses bilaterally  Neurological: A&O x3  Skin: intact  Psychiatric: Responsive

## 2022-10-07 NOTE — PROGRESS NOTE ADULT - ATTENDING COMMENTS
#RUQ and epigastric abdominal pain  #?pancreatitis  #ESRD  #gastric varices/portal HTN  #myelofibrosis  #PCV  #hx of ERCP pancreatitis  - patient was seen and examined at bedside, hx of ERCP induced pancreatitis  - patient today looked distressed, writhing in pain when approached by bedside  - she described 10/10 pain, especially after food intake  - RUQ tenderness to palpation, worse today, also some tenderness in epigastrium  - repeat US abdomen ordered, repeat lipase 168 down to 257  - CTAP showing normal liver contour, splenomegaly, and pneumobilia(previously seen on imaging)  - HIDA scan and MRCP positive for acute cholecystitis  - transplant surgery/trauma surgery consulted, no acute surgical intervention given comorbidities  - plan for outpatient elective cholecystectomy after completion of antibiotics  - IR consulted for possible percutaneous vikas; however after speaking with resident, low suspicion for acute cholecystitis   - possibly pancreatitis? pain I simproved   - pain control with Dilaudid PRN   - hepatology: no acute intervention  - on Zosyn empirically, blood culture negative to date, will continue for now  - dispo pending improvement in pain and patients ability to tolerate diet

## 2022-10-07 NOTE — PROGRESS NOTE ADULT - PROBLEM SELECTOR PLAN 3
- Bi monthly transfusions at [facility]. Followed by Dr. Benjy Guo  - US doppler abdomen no portal or splenic vein thrombosis.  - US RUQ with no acute cholecystitis detected on 10/5  - Liver biopsy with nodular regenerative hyperplasia  - Hgb 7.0 baseline high 8's/low 9's based on outpatient labs  - Continue Danazol  - Heme consult  - s/p 2unit PRBC early in admission - Bi monthly transfusions at [facility]. Followed by Dr. Benjy Guo  - US doppler abdomen no portal or splenic vein thrombosis.  - Liver biopsy with nodular regenerative hyperplasia  - Hgb 7.0 baseline high 8's/low 9's based on outpatient labs  - Continue Danazol  - Heme following  - s/p 2unit PRBC early in admission

## 2022-10-07 NOTE — PROGRESS NOTE ADULT - ASSESSMENT
61 yo F hx of myelofibrosis, polycythemia vera, ESRD on [facility] schedule via LUE AVF (last dialysis W), portal HTN, subclavian port for transfusions, gastric varices, gallstones, recent ERCP for choledocolithiasis s/p biliary stent, c/b post ERCP pancreatitis now presenting with fever and RUQ and epigastric pain. NM Hepatobiliary study positive for acute cholecystitis. MRCP + for acute vikas. However, US RUQ not consistent with acute cholecystitis. May likely have pancreatitis given elevated lipase and severe abdominal pain.

## 2022-10-07 NOTE — PROGRESS NOTE ADULT - ASSESSMENT
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 Briefly, patient known to have gastric varices since 2014 on imaging. s/p liver biopsy w/ elevated portosystemic gradient of 9 with histology demonstrating nodular regenerative hyperplasia but no fibrosis. Underwent EGD in 2021 with gastric varices (IGV1 and IGV2) not amenable to endoscopic therapy. Patient had recent ERCP done on 9/22/22 for choledocholithiasis s/p metal stent.         Plan:  No indication for surgery at this time in the setting of improved pain   Will continue to monitor   Continue HD per renal  Continue care per primary team  We will plan for vikas as outpt   Please page [phone] w/ any questions

## 2022-10-07 NOTE — PROGRESS NOTE ADULT - SUBJECTIVE AND OBJECTIVE BOX
Gastroenterology/Hepatology Progress Note    Interval Events: RUQ pain improving.     Allergies:  No Known Allergies      Hospital Medications:  aluminum hydroxide/magnesium hydroxide/simethicone Suspension 30 milliLiter(s) Oral every 4 hours PRN  carvedilol 3.125 milliGRAM(s) Oral every 12 hours  chlorhexidine 2% Cloths 1 Application(s) Topical daily  danazol 200 milliGRAM(s) Oral three times a day  epoetin filiberto-epbx (RETACRIT) Injectable 09448 Unit(s) IV Push <User Schedule>  HYDROmorphone  Injectable 0.4 milliGRAM(s) IV Push every 4 hours PRN  multivitamin 1 Tablet(s) Oral daily  Nephro-deonna 1 Tablet(s) Oral daily  piperacillin/tazobactam IVPB.. 3.375 Gram(s) IV Intermittent every 12 hours  ruxolitinib 10 milliGRAM(s) Oral <User Schedule>      ROS: 14 point ROS negative unless otherwise state in subjective    PHYSICAL EXAM:   Vital Signs:  Vital Signs Last 24 Hrs  T(C): 36.5 (07 Oct 2022 12:45), Max: 36.7 (07 Oct 2022 04:32)  T(F): 97.7 (07 Oct 2022 12:45), Max: 98.1 (07 Oct 2022 04:32)  HR: 75 (07 Oct 2022 17:06) (67 - 82)  BP: 148/80 (07 Oct 2022 17:06) (128/73 - 165/79)  BP(mean): --  RR: 18 (07 Oct 2022 12:45) (18 - 19)  SpO2: 98% (07 Oct 2022 12:45) (96% - 98%)    Parameters below as of 07 Oct 2022 12:45  Patient On (Oxygen Delivery Method): room air      Daily     Daily Weight in k.5 (07 Oct 2022 12:45)    GENERAL:  No acute distress  HEENT:  NCAT, no scleral icterus  CHEST: no resp distress  HEART:  RRR  ABDOMEN:  Soft, mild ttp ruq  EXTREMITIES:  No cyanosis, clubbing, or edema  SKIN:  No rash/erythema/ecchymoses/petechiae/wounds/abscess/warm/dry  NEURO:  Alert and oriented x 3, no asterixis, no tremor    LABS:                        9.3    9.00  )-----------( 132      ( 07 Oct 2022 07:26 )             30.8     Mean Cell Volume: 97.5 fl (10-07- @ 07:26)    10-07    134<L>  |  97  |  24<H>  ----------------------------<  74  5.0   |  24  |  5.66<H>    Ca    8.1<L>      07 Oct 2022 07:26  Phos  2.7     10-07  Mg     2.4     10-07    TPro  6.2  /  Alb  3.2<L>  /  TBili  1.2  /  DBili  x   /  AST  15  /  ALT  11  /  AlkPhos  58  10-07    LIVER FUNCTIONS - ( 07 Oct 2022 07:26 )  Alb: 3.2 g/dL / Pro: 6.2 g/dL / ALK PHOS: 58 U/L / ALT: 11 U/L / AST: 15 U/L / GGT: x                     Imaging:  reviewed

## 2022-10-07 NOTE — PROGRESS NOTE ADULT - PROBLEM SELECTOR PLAN 3
Recently phos has been low. Please hold phoslo for now    Obtain phosphorus and calcium levels with BMPs    If you have any questions, please feel free to contact me  Keshawn Farfan  Nephrology Fellow  [phone]; Prefer [employer] TEAMS  (After 5pm or on weekends please page the on-call fellow).    Patient was discussed with Dr. Fajardo who agrees with my A/P. Addendum to follow

## 2022-10-07 NOTE — PROGRESS NOTE ADULT - SUBJECTIVE AND OBJECTIVE BOX
[facility] Division of Kidney Diseases & Hypertension  FOLLOW UP NOTE  [phone]--------------------------------------------------------------------------------  Chief Complaint:Abdominal pain        24 hour events/subjective:  Will plan for HD today. Transplant surgeon to evaluate need for inpatient vs outpatient for vikas.       PAST HISTORY  --------------------------------------------------------------------------------  No significant changes to PMH, PSH, FHx, SHx, unless otherwise noted    ALLERGIES & MEDICATIONS  --------------------------------------------------------------------------------  Allergies    No Known Allergies    Intolerances      Standing Inpatient Medications  carvedilol 3.125 milliGRAM(s) Oral every 12 hours  chlorhexidine 2% Cloths 1 Application(s) Topical daily  danazol 200 milliGRAM(s) Oral three times a day  epoetin filiberto-epbx (RETACRIT) Injectable 61803 Unit(s) IV Push <User Schedule>  multivitamin 1 Tablet(s) Oral daily  Nephro-deonna 1 Tablet(s) Oral daily  piperacillin/tazobactam IVPB.. 3.375 Gram(s) IV Intermittent every 12 hours  ruxolitinib 10 milliGRAM(s) Oral <User Schedule>  senna 1 Tablet(s) Oral once    PRN Inpatient Medications  aluminum hydroxide/magnesium hydroxide/simethicone Suspension 30 milliLiter(s) Oral every 4 hours PRN  HYDROmorphone  Injectable 0.4 milliGRAM(s) IV Push every 4 hours PRN      REVIEW OF SYSTEMS  --------------------------------------------------------------------------------  Gen: No  fevers/chills  Skin: No rashes  Head/Eyes/Ears/Mouth: No headache; Normal hearing; Normal vision w/o blurriness  Respiratory: No dyspnea, cough, wheezing, hemoptysis  CV: No chest pain, PND, orthopnea  GI: + abdominal pain,- diarrhea, constipation, nausea, vomiting  : No increased frequency, dysuria, hematuria, nocturia  MSK: No joint pain/swelling; no back pain; no edema  Neuro: No dizziness/lightheadedness, weakness, seizures, numbness, tingling      All other systems were reviewed and are negative, except as noted.    VITALS/PHYSICAL EXAM  --------------------------------------------------------------------------------  T(C): 36.7 (10-07-22 @ 09:25), Max: 36.7 (10-07-22 @ 04:32)  HR: 71 (10-07-22 @ 09:25) (61 - 82)  BP: 165/79 (10-07-22 @ 09:25) (126/69 - 165/79)  RR: 19 (10-07-22 @ 09:25) (18 - 19)  SpO2: 98% (10-07-22 @ 09:25) (96% - 98%)  Wt(kg): --        10-06-22 @ 07:01  -  10-07-22 @ 07:00  --------------------------------------------------------  IN: 200 mL / OUT: 0 mL / NET: 200 mL    10-07-22 @ 07:01  -  10-07-22 @ 11:04  --------------------------------------------------------  IN: 240 mL / OUT: 0 mL / NET: 240 mL      Physical Exam:  Gen: NAD, uncomfortable   	HEENT:  supple neck, clear oropharynx  	Pulm: CTA B/L  	CV: RRR, S1S2; no rub  	Back: No spinal or CVA tenderness; no sacral edema  	Abd: +BS, soft, epigastric tenderness   	: No suprapubic tenderness  	LE: Warm, FROM, no clubbing, intact strength; no edema  	Neuro: No focal deficits, intact gait  	Psych: Normal affect and mood  	Skin: Warm, without rashes  	Vascular access: LUE Fistula without aneurysmal dilatation    LABS/STUDIES  --------------------------------------------------------------------------------              9.3    9.00  >-----------<  132      [10-07-22 @ 07:26]              30.8     134  |  97  |  24  ----------------------------<  74      [10-07-22 @ 07:26]  5.0   |  24  |  5.66        Ca     8.1     [10-07-22 @ 07:26]      Mg     2.4     [10-07-22 @ 07:26]      Phos  2.7     [10-07-22 @ 07:26]    TPro  6.2  /  Alb  3.2  /  TBili  1.2  /  DBili  x   /  AST  15  /  ALT  11  /  AlkPhos  58  [10-07-22 @ 07:26]          Creatinine Trend:  SCr 5.66 [10-07 @ 07:26]  SCr 4.02 [10-06 @ 06:12]  SCr 4.35 [10-04 @ 07:31]  SCr 6.74 [10-03 @ 06:28]  SCr 5.40 [10-02 @ 06:51]

## 2022-10-07 NOTE — PROGRESS NOTE ADULT - ATTENDING COMMENTS
Pt. seen on HD, tolerating HD well. Bp stable and AVF functioning well during HD. Plan for next maintenance HD on Monday. Monitor BMP.

## 2022-10-07 NOTE — PROGRESS NOTE ADULT - ASSESSMENT
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 Briefly, patient known to have gastric varices since 2014 on imaging. s/p liver biopsy w/ elevated portosystemic gradient of 9 with histology demonstrating nodular regenerative hyperplasia but no fibrosis. Underwent EGD in 2021 with gastric varices (IGV1 and IGV2) not amenable to endoscopic therapy. Patient had recent ERCP done on 9/22/22 for choledocholithiasis s/p metal stent.     #Hx of Choledocholithiasis s/p ERCP on 9/22/22 with metal stent complicated with pancreatitis.   -Patient with normal Bilirubin demonstrating patent stent. Suspect ongoing abdominal pain biliary colic pain ?gallbladder vs ? pancreatitis from last week.   - MRCP w/ possible acute vikas, hepatic microabscesses. No plan for surgical intervention inpatient.   #Nodular regenerative hyperplasia   #noncirrhotic portal HTN   #Gastric varices, size seem stable in ERCP compared to last EGD in 1/2021.     Recommendations:  -c/w Coreg 3.125 mg BID  -Low fat diet  -c/w abx  - pt to f/u with sx as outpatient     All recommendations are tentative until note is attested by attending.     Nurys Link, PGY5  Gastroenterology/Hepatology Fellow  Available on [employer] Teams  [phone] ([employer] Short Range Pager)  [phone] (Long Range Pager)    After 5pm, please contact the on-call GI fellow. [phone]

## 2022-10-07 NOTE — PROGRESS NOTE ADULT - SUBJECTIVE AND OBJECTIVE BOX
PROGRESS NOTE:   Authored by: Atul Conroy M.D.   Internal Medicine PGY-1  Please Contact Via Teams    Patient is a 62y old  Female who presents with a chief complaint of Abdominal pain (06 Oct 2022 16:20)      SUBJECTIVE / OVERNIGHT EVENTS:  No acute events overnight.     ADDITIONAL REVIEW OF SYSTEMS:  Patient denies fevers, chills, chest pain, shortness of breath, nausea, abdominal pain, diarrhea, constipation, dysuria, leg swelling, headache, light headedness.    MEDICATIONS  (STANDING):  carvedilol 3.125 milliGRAM(s) Oral every 12 hours  chlorhexidine 2% Cloths 1 Application(s) Topical daily  danazol 200 milliGRAM(s) Oral three times a day  epoetin filiberto-epbx (RETACRIT) Injectable 53370 Unit(s) IV Push <User Schedule>  lactated ringers. 500 milliLiter(s) (75 mL/Hr) IV Continuous <Continuous>  multivitamin 1 Tablet(s) Oral daily  Nephro-deonna 1 Tablet(s) Oral daily  piperacillin/tazobactam IVPB.. 3.375 Gram(s) IV Intermittent every 12 hours  ruxolitinib 10 milliGRAM(s) Oral <User Schedule>    MEDICATIONS  (PRN):  aluminum hydroxide/magnesium hydroxide/simethicone Suspension 30 milliLiter(s) Oral every 4 hours PRN Dyspepsia  HYDROmorphone  Injectable 0.4 milliGRAM(s) IV Push every 4 hours PRN Pain      CAPILLARY BLOOD GLUCOSE        I&O's Summary    06 Oct 2022 07:01  -  07 Oct 2022 07:00  --------------------------------------------------------  IN: 200 mL / OUT: 0 mL / NET: 200 mL        PHYSICAL EXAM:  Vital Signs Last 24 Hrs  T(C): 36.7 (07 Oct 2022 04:32), Max: 36.7 (07 Oct 2022 04:32)  T(F): 98.1 (07 Oct 2022 04:32), Max: 98.1 (07 Oct 2022 04:32)  HR: 67 (07 Oct 2022 04:32) (61 - 82)  BP: 128/73 (07 Oct 2022 04:32) (126/69 - 143/81)  BP(mean): --  RR: 19 (07 Oct 2022 04:32) (18 - 19)  SpO2: 96% (07 Oct 2022 04:32) (96% - 97%)    Parameters below as of 07 Oct 2022 04:32  Patient On (Oxygen Delivery Method): room air        CONSTITUTIONAL: NAD, well-developed  RESPIRATORY: Normal respiratory effort; lungs are clear to auscultation bilaterally  CARDIOVASCULAR: Regular rate and rhythm, normal S1 and S2, no murmur/rub/gallop; No lower extremity edema; Peripheral pulses are 2+ bilaterally  ABDOMEN: Nontender to palpation, normoactive bowel sounds, no rebound/guarding; No hepatosplenomegaly  MUSCLOSKELETAL: no clubbing or cyanosis of digits; no joint swelling or tenderness to palpation  PSYCH: A+O to person, place, and time; affect appropriate    LABS:                        9.4    8.00  )-----------( 137      ( 06 Oct 2022 06:19 )             31.2     10-06    133<L>  |  96  |  17  ----------------------------<  151<H>  4.1   |  25  |  4.02<H>    Ca    7.9<L>      06 Oct 2022 06:12  Phos  1.6     10-06  Mg     2.1     10-06    TPro  6.2  /  Alb  3.2<L>  /  TBili  0.8  /  DBili  x   /  AST  16  /  ALT  10  /  AlkPhos  67  10-06                Tele Reviewed:    RADIOLOGY & ADDITIONAL TESTS:  Results Reviewed:   Imaging Personally Reviewed:  Electrocardiogram Personally Reviewed:     PROGRESS NOTE:   Authored by: Atul Conroy M.D.   Internal Medicine PGY-1  Please Contact Via Teams    Patient is a 62y old  Female who presents with a chief complaint of Abdominal pain (06 Oct 2022 16:20)      SUBJECTIVE / OVERNIGHT EVENTS:  No acute events overnight. Abdominal pain is improved since yesterday with IVF and bowel rest, though patient still in pain.    MEDICATIONS  (STANDING):  carvedilol 3.125 milliGRAM(s) Oral every 12 hours  chlorhexidine 2% Cloths 1 Application(s) Topical daily  danazol 200 milliGRAM(s) Oral three times a day  epoetin filiberto-epbx (RETACRIT) Injectable 17521 Unit(s) IV Push <User Schedule>  lactated ringers. 500 milliLiter(s) (75 mL/Hr) IV Continuous <Continuous>  multivitamin 1 Tablet(s) Oral daily  Nephro-deonna 1 Tablet(s) Oral daily  piperacillin/tazobactam IVPB.. 3.375 Gram(s) IV Intermittent every 12 hours  ruxolitinib 10 milliGRAM(s) Oral <User Schedule>    MEDICATIONS  (PRN):  aluminum hydroxide/magnesium hydroxide/simethicone Suspension 30 milliLiter(s) Oral every 4 hours PRN Dyspepsia  HYDROmorphone  Injectable 0.4 milliGRAM(s) IV Push every 4 hours PRN Pain      CAPILLARY BLOOD GLUCOSE        I&O's Summary    06 Oct 2022 07:01  -  07 Oct 2022 07:00  --------------------------------------------------------  IN: 200 mL / OUT: 0 mL / NET: 200 mL        PHYSICAL EXAM:  Vital Signs Last 24 Hrs  T(C): 36.7 (07 Oct 2022 04:32), Max: 36.7 (07 Oct 2022 04:32)  T(F): 98.1 (07 Oct 2022 04:32), Max: 98.1 (07 Oct 2022 04:32)  HR: 67 (07 Oct 2022 04:32) (61 - 82)  BP: 128/73 (07 Oct 2022 04:32) (126/69 - 143/81)  BP(mean): --  RR: 19 (07 Oct 2022 04:32) (18 - 19)  SpO2: 96% (07 Oct 2022 04:32) (96% - 97%)    Parameters below as of 07 Oct 2022 04:32  Patient On (Oxygen Delivery Method): room air      GENERAL: NAD  HEAD:  Atraumatic, Normocephalic  EYES: EOMI, PERRLA, conjunctiva and sclera clear  ENT: Moist mucous membranes  NECK: Supple, No JVD  CHEST/LUNG: Clear to auscultation bilaterally; Unlabored respirations  HEART: Regular rate and rhythm; No murmurs. Chest tender to palpation  Peripheral edema: none  ABDOMEN: RUQ with arguello sign and epigastric tenderness. Splenomegaly  EXTREMITIES:  2+ radial pulses, 2+ dorsalis pedis. AVF patent.  NERVOUS SYSTEM:  A&Ox3, no gross lateralizing deficits   SKIN: No rashes or lesions    LABS:                        9.4    8.00  )-----------( 137      ( 06 Oct 2022 06:19 )             31.2     10-06    133<L>  |  96  |  17  ----------------------------<  151<H>  4.1   |  25  |  4.02<H>    Ca    7.9<L>      06 Oct 2022 06:12  Phos  1.6     10-06  Mg     2.1     10-06    TPro  6.2  /  Alb  3.2<L>  /  TBili  0.8  /  DBili  x   /  AST  16  /  ALT  10  /  AlkPhos  67  10-06        No new micro    < from: US Abdomen Upper Quadrant Right (10.06.22 @ 14:51) >  FINDINGS:  Liver: The above findings within the liver on recent MRI examination are   not appreciated on this study. Prominent main portal vein.  Bile ducts: Common bile duct stent. Pneumobilia. Common bile duct   measures 6 mm.  Gallbladder: Cholelithiasis. Gallbladder sludge. Wall measures 3 mm upper   normal. Negative sonographic Arguello's sign. CBD stent visualized   containing air. No sonographic Arguello's sign. Pain medication has been   given  Pancreas: Visualized pancreatic parenchyma is heterogeneous.   Peripancreatic varices.  Right kidney: 8 cm. Atrophic. No hydronephrosis. Tiny 4 mm cyst.  Ascites: None.  IVC: IVC measures 2.3 cm  Miscellaneous: Small right pleural effusion.    < end of copied text >

## 2022-10-08 LAB
ALBUMIN SERPL ELPH-MCNC: 3.3 G/DL — SIGNIFICANT CHANGE UP (ref 3.3–5)
ALP SERPL-CCNC: 62 U/L — SIGNIFICANT CHANGE UP (ref 40–120)
ALT FLD-CCNC: 13 U/L — SIGNIFICANT CHANGE UP (ref 10–45)
ANION GAP SERPL CALC-SCNC: 14 MMOL/L — SIGNIFICANT CHANGE UP (ref 5–17)
AST SERPL-CCNC: 22 U/L — SIGNIFICANT CHANGE UP (ref 10–40)
BILIRUB SERPL-MCNC: 1.4 MG/DL — HIGH (ref 0.2–1.2)
BUN SERPL-MCNC: 14 MG/DL — SIGNIFICANT CHANGE UP (ref 7–23)
CALCIUM SERPL-MCNC: 8 MG/DL — LOW (ref 8.4–10.5)
CHLORIDE SERPL-SCNC: 96 MMOL/L — SIGNIFICANT CHANGE UP (ref 96–108)
CO2 SERPL-SCNC: 25 MMOL/L — SIGNIFICANT CHANGE UP (ref 22–31)
CREAT SERPL-MCNC: 4.24 MG/DL — HIGH (ref 0.5–1.3)
EGFR: 11 ML/MIN/1.73M2 — LOW
GLUCOSE SERPL-MCNC: 75 MG/DL — SIGNIFICANT CHANGE UP (ref 70–99)
HCT VFR BLD CALC: 30.9 % — LOW (ref 34.5–45)
HGB BLD-MCNC: 9.7 G/DL — LOW (ref 11.5–15.5)
MAGNESIUM SERPL-MCNC: 2.3 MG/DL — SIGNIFICANT CHANGE UP (ref 1.6–2.6)
MCHC RBC-ENTMCNC: 30.3 PG — SIGNIFICANT CHANGE UP (ref 27–34)
MCHC RBC-ENTMCNC: 31.4 GM/DL — LOW (ref 32–36)
MCV RBC AUTO: 96.6 FL — SIGNIFICANT CHANGE UP (ref 80–100)
NRBC # BLD: 2 /100 WBCS — HIGH (ref 0–0)
PHOSPHATE SERPL-MCNC: 2.7 MG/DL — SIGNIFICANT CHANGE UP (ref 2.5–4.5)
PLATELET # BLD AUTO: 154 K/UL — SIGNIFICANT CHANGE UP (ref 150–400)
POTASSIUM SERPL-MCNC: 4 MMOL/L — SIGNIFICANT CHANGE UP (ref 3.5–5.3)
POTASSIUM SERPL-SCNC: 4 MMOL/L — SIGNIFICANT CHANGE UP (ref 3.5–5.3)
PROT SERPL-MCNC: 6.2 G/DL — SIGNIFICANT CHANGE UP (ref 6–8.3)
RBC # BLD: 3.2 M/UL — LOW (ref 3.8–5.2)
RBC # FLD: 18.5 % — HIGH (ref 10.3–14.5)
SODIUM SERPL-SCNC: 135 MMOL/L — SIGNIFICANT CHANGE UP (ref 135–145)
WBC # BLD: 8.55 K/UL — SIGNIFICANT CHANGE UP (ref 3.8–10.5)
WBC # FLD AUTO: 8.55 K/UL — SIGNIFICANT CHANGE UP (ref 3.8–10.5)

## 2022-10-08 PROCEDURE — 99232 SBSQ HOSP IP/OBS MODERATE 35: CPT

## 2022-10-08 RX ADMIN — CARVEDILOL PHOSPHATE 3.12 MILLIGRAM(S): 80 CAPSULE, EXTENDED RELEASE ORAL at 21:57

## 2022-10-08 RX ADMIN — HYDROMORPHONE HYDROCHLORIDE 0.4 MILLIGRAM(S): 2 INJECTION INTRAMUSCULAR; INTRAVENOUS; SUBCUTANEOUS at 22:56

## 2022-10-08 RX ADMIN — CHLORHEXIDINE GLUCONATE 1 APPLICATION(S): 213 SOLUTION TOPICAL at 11:18

## 2022-10-08 RX ADMIN — PIPERACILLIN AND TAZOBACTAM 25 GRAM(S): 4; .5 INJECTION, POWDER, LYOPHILIZED, FOR SOLUTION INTRAVENOUS at 11:22

## 2022-10-08 RX ADMIN — Medication 1 TABLET(S): at 11:22

## 2022-10-08 RX ADMIN — CARVEDILOL PHOSPHATE 3.12 MILLIGRAM(S): 80 CAPSULE, EXTENDED RELEASE ORAL at 08:17

## 2022-10-08 RX ADMIN — HYDROMORPHONE HYDROCHLORIDE 0.4 MILLIGRAM(S): 2 INJECTION INTRAMUSCULAR; INTRAVENOUS; SUBCUTANEOUS at 23:30

## 2022-10-08 RX ADMIN — PIPERACILLIN AND TAZOBACTAM 25 GRAM(S): 4; .5 INJECTION, POWDER, LYOPHILIZED, FOR SOLUTION INTRAVENOUS at 00:29

## 2022-10-08 NOTE — PROGRESS NOTE ADULT - PROBLEM SELECTOR PLAN 3
- Bi monthly transfusions at [facility]. Followed by Dr. Benjy Guo  - US doppler abdomen no portal or splenic vein thrombosis.  - Liver biopsy with nodular regenerative hyperplasia  - Hgb 7.0 baseline high 8's/low 9's based on outpatient labs  - Continue Danazol  - Heme following  - s/p 2unit PRBC early in admission

## 2022-10-08 NOTE — PROGRESS NOTE ADULT - ASSESSMENT
63 yo F hx of myelofibrosis, polycythemia vera, ESRD on MFW schedule via LUE AVF (last dialysis W), portal HTN, subclavian port for transfusions, gastric varices, gallstones, recent ERCP for choledocolithiasis s/p biliary stent, c/b post ERCP pancreatitis now presenting with fever and RUQ and epigastric pain. NM Hepatobiliary study positive for acute cholecystitis. MRCP + for acute vikas. However, US RUQ not consistent with acute cholecystitis. May likely have pancreatitis given elevated lipase and severe abdominal pain.  61 yo F hx of myelofibrosis, polycythemia vera, ESRD on MFW schedule via LUE AVF (last dialysis W), portal HTN, subclavian port for transfusions, gastric varices, gallstones, recent ERCP for choledocolithiasis s/p biliary stent, c/b post ERCP pancreatitis now presenting with fever and RUQ and epigastric pain. NM Hepatobiliary study positive for acute cholecystitis. MRCP + for acute vikas. However, US RUQ not consistent with acute cholecystitis. May likely have pancreatitis given elevated lipase and severe abdominal pain. No inpatient intervention as per surgery.

## 2022-10-08 NOTE — PROGRESS NOTE ADULT - PROBLEM SELECTOR PLAN 1
- Last admission pt with cholelithiasis and choledocholithiasis. Had ERCP with stone removal c/b post procedure pancreatitis that delayed surgery. Cholelithiasis and a focus of antidependent air seen on CT. Gallbladder wall thickening/edema on CT from 9/26. May be acute cholecystitis and pancreatitis. Abdominal pain improved today from fluids and bowel rest; currently on abx  - HIDA scan positive for cholecystitis --> transplant and general surgery notified.   - Lipase also elevated at 257. Repeat today ~160's  - Patient without elevated WBC but has myelofibrosis. afebrile. BCx NGTD.   - Empiric zosyn 9/30-->10/7  - Lactate 0.5 no concern for mesenteric ischemia  - Low fat diet.  - MRCP with mildly distended gallbladder with nonspecific diffuse wall thickening and   air from instrumentation  -US RUQ indicates no acute cholecystitis 10/5  - Given high surgical risk, no operation inpatient and patient should follow with general and transplant surgery, though pending repeat eval from Dr. Gaspar. - Last admission pt with cholelithiasis and choledocholithiasis. Had ERCP with stone removal c/b post procedure pancreatitis that delayed surgery. Cholelithiasis and a focus of antidependent air seen on CT. Gallbladder wall thickening/edema on CT from 9/26. May be acute cholecystitis and pancreatitis. Abdominal pain improved today from fluids and bowel rest; currently on abx  - HIDA scan positive for cholecystitis  - MRCP with mildly distended gallbladder with nonspecific diffuse wall thickening and   air from instrumentation, possible vikas  -US RUQ indicates no acute cholecystitis 10/5  - Lipase also elevated at 257. Repeat today ~160's  - Patient without elevated WBC but has myelofibrosis. afebrile. BCx NGTD.   - Empiric zosyn 9/30-->10/8  - Low fat diet.  - Given high surgical risk, no operation inpatient and patient should follow with general and transplant surgery, though pending repeat eval from Dr. Gaspar. - Last admission pt with cholelithiasis and choledocholithiasis. Had ERCP with stone removal c/b post procedure pancreatitis that delayed surgery. Cholelithiasis and a focus of antidependent air seen on CT. Gallbladder wall thickening/edema on CT from 9/26. May be acute cholecystitis and pancreatitis. Abdominal pain improved today from fluids and bowel rest; currently on abx  - HIDA scan positive for cholecystitis  - MRCP with mildly distended gallbladder with nonspecific diffuse wall thickening and   air from instrumentation, possible vikas  -US RUQ indicates no acute cholecystitis 10/5  - Lipase also elevated at 257. Repeat today ~160's  - Patient without elevated WBC but has myelofibrosis. afebrile. BCx NGTD.   - Empiric zosyn 9/30-->10/8  - Low fat diet.  - Given high surgical risk, no operation inpatient and patient should follow with general and transplant surgery outpatient

## 2022-10-08 NOTE — PROGRESS NOTE ADULT - PROBLEM SELECTOR PLAN 6
DVT proph: SCD  Dispo: pending clinical improvement and hepatology re-eval DVT proph: SCD  Dispo: Patient to try renal and low fat diet today and if tolerates can be d/c. Pending reinstatement of dialysis but unlikely to happen overweekend so she might have to go on monday after dialysis. DVT proph: SCD  Dispo: Patient to try renal and low fat diet today and if tolerates can be d/c. Pending reinstatement of dialysis but unlikely to happen over the weekend so she might have to go on monday after dialysis.

## 2022-10-08 NOTE — PROGRESS NOTE ADULT - ATTENDING COMMENTS
61 yo F hx of myelofibrosis, polycythemia vera, ESRD on MWF schedule via LUE AVF (last dialysis W), portal HTN, subclavian port for transfusions, gastric varices, gallstones, recent ERCP for choledocholithiasis s/p biliary stent, c/b post ERCP pancreatitis + pneumobilia now presenting with fever and RUQ and epigastric pain 2/2 cholecystitis.    #RUQ and epigastric abdominal pain  #?pancreatitis  #ESRD  #gastric varices/portal HTN  #myelofibrosis  #PCV  #hx of ERCP pancreatitis    - Patient evaluated at bedside with daughter present, with slight residual right sided abdominal pain, but otherwise well. Patient transitioned to and tolerating solid food relatively well without significant pain, will continue to monitor. Per consultants, no inpatient intervention; patient advised to follow-up outpatient for continued evaluation for cholecystectomy. Patient to complete course of Augmentin upon discharge.  - CM/SW to reestablish dialysis services early next week; anticipate patient will receive HD 10/10 early before discharge.    Case discussed with Team 6.

## 2022-10-08 NOTE — PROGRESS NOTE ADULT - SUBJECTIVE AND OBJECTIVE BOX
PROGRESS NOTE:   Authored by: Atul Conroy M.D.   Internal Medicine PGY-1  Please Contact Via Teams    Patient is a 62y old  Female who presents with a chief complaint of Abdominal pain (07 Oct 2022 18:48)      SUBJECTIVE / OVERNIGHT EVENTS:  No acute events overnight.     ADDITIONAL REVIEW OF SYSTEMS:  Patient denies fevers, chills, chest pain, shortness of breath, nausea, abdominal pain, diarrhea, constipation, dysuria, leg swelling, headache, light headedness.    MEDICATIONS  (STANDING):  carvedilol 3.125 milliGRAM(s) Oral every 12 hours  chlorhexidine 2% Cloths 1 Application(s) Topical daily  epoetin filiberto-epbx (RETACRIT) Injectable 64812 Unit(s) IV Push <User Schedule>  multivitamin 1 Tablet(s) Oral daily  Nephro-deonna 1 Tablet(s) Oral daily  piperacillin/tazobactam IVPB.. 3.375 Gram(s) IV Intermittent every 12 hours  ruxolitinib 10 milliGRAM(s) Oral <User Schedule>    MEDICATIONS  (PRN):  aluminum hydroxide/magnesium hydroxide/simethicone Suspension 30 milliLiter(s) Oral every 4 hours PRN Dyspepsia  HYDROmorphone  Injectable 0.4 milliGRAM(s) IV Push every 4 hours PRN Pain      CAPILLARY BLOOD GLUCOSE        I&O's Summary    07 Oct 2022 07:01  -  08 Oct 2022 07:00  --------------------------------------------------------  IN: 240 mL / OUT: 1000 mL / NET: -760 mL        PHYSICAL EXAM:  Vital Signs Last 24 Hrs  T(C): 37 (08 Oct 2022 04:15), Max: 37 (08 Oct 2022 04:15)  T(F): 98.6 (08 Oct 2022 04:15), Max: 98.6 (08 Oct 2022 04:15)  HR: 73 (08 Oct 2022 04:15) (71 - 75)  BP: 121/67 (08 Oct 2022 04:15) (121/67 - 165/79)  BP(mean): --  RR: 18 (08 Oct 2022 04:15) (18 - 19)  SpO2: 96% (08 Oct 2022 04:15) (96% - 98%)    Parameters below as of 08 Oct 2022 04:15  Patient On (Oxygen Delivery Method): room air        CONSTITUTIONAL: NAD, well-developed  RESPIRATORY: Normal respiratory effort; lungs are clear to auscultation bilaterally  CARDIOVASCULAR: Regular rate and rhythm, normal S1 and S2, no murmur/rub/gallop; No lower extremity edema; Peripheral pulses are 2+ bilaterally  ABDOMEN: Nontender to palpation, normoactive bowel sounds, no rebound/guarding; No hepatosplenomegaly  MUSCLOSKELETAL: no clubbing or cyanosis of digits; no joint swelling or tenderness to palpation  PSYCH: A+O to person, place, and time; affect appropriate    LABS:                        9.3    9.00  )-----------( 132      ( 07 Oct 2022 07:26 )             30.8     10-07    134<L>  |  97  |  24<H>  ----------------------------<  74  5.0   |  24  |  5.66<H>    Ca    8.1<L>      07 Oct 2022 07:26  Phos  2.7     10-07  Mg     2.4     10-07    TPro  6.2  /  Alb  3.2<L>  /  TBili  1.2  /  DBili  x   /  AST  15  /  ALT  11  /  AlkPhos  58  10-07                Tele Reviewed:    RADIOLOGY & ADDITIONAL TESTS:  Results Reviewed:   Imaging Personally Reviewed:  Electrocardiogram Personally Reviewed:     PROGRESS NOTE:   Authored by: Atul Conroy M.D.   Internal Medicine PGY-1  Please Contact Via Teams    Patient is a 62y old  Female who presents with a chief complaint of Abdominal pain (07 Oct 2022 18:48)      SUBJECTIVE / OVERNIGHT EVENTS:  Pt received 1 push IV dilaudid overnight for abdominal pain. This am she is saying that she is having pain with eating. She could only tolerate 2 spoons of jello yesterday. Still in pain on exam, at about the same level. No inpatient intervention as per transplant surgery. Will follow her in clinic. Patient rightfully asks what to do when she goes home and has the worsening abdominal pain again. Counseled to return to ED if this happens.    MEDICATIONS  (STANDING):  carvedilol 3.125 milliGRAM(s) Oral every 12 hours  chlorhexidine 2% Cloths 1 Application(s) Topical daily  epoetin filiberto-epbx (RETACRIT) Injectable 59339 Unit(s) IV Push <User Schedule>  multivitamin 1 Tablet(s) Oral daily  Nephro-deonna 1 Tablet(s) Oral daily  piperacillin/tazobactam IVPB.. 3.375 Gram(s) IV Intermittent every 12 hours  ruxolitinib 10 milliGRAM(s) Oral <User Schedule>    MEDICATIONS  (PRN):  aluminum hydroxide/magnesium hydroxide/simethicone Suspension 30 milliLiter(s) Oral every 4 hours PRN Dyspepsia  HYDROmorphone  Injectable 0.4 milliGRAM(s) IV Push every 4 hours PRN Pain      CAPILLARY BLOOD GLUCOSE    I&O's Summary    07 Oct 2022 07:01  -  08 Oct 2022 07:00  --------------------------------------------------------  IN: 240 mL / OUT: 1000 mL / NET: -760 mL        PHYSICAL EXAM:  Vital Signs Last 24 Hrs  T(C): 37 (08 Oct 2022 04:15), Max: 37 (08 Oct 2022 04:15)  T(F): 98.6 (08 Oct 2022 04:15), Max: 98.6 (08 Oct 2022 04:15)  HR: 73 (08 Oct 2022 04:15) (71 - 75)  BP: 121/67 (08 Oct 2022 04:15) (121/67 - 165/79)  BP(mean): --  RR: 18 (08 Oct 2022 04:15) (18 - 19)  SpO2: 96% (08 Oct 2022 04:15) (96% - 98%)    Parameters below as of 08 Oct 2022 04:15  Patient On (Oxygen Delivery Method): room air      GENERAL: NAD  HEAD:  Atraumatic, Normocephalic  EYES: EOMI, PERRLA, conjunctiva and sclera clear  ENT: Moist mucous membranes  NECK: Supple, No JVD  CHEST/LUNG: Clear to auscultation bilaterally; Unlabored respirations  HEART: Regular rate and rhythm; No murmurs. Chest tender to palpation  Peripheral edema: none  ABDOMEN: RUQ with hightower sign and epigastric tenderness. Splenomegaly  EXTREMITIES:  2+ radial pulses, 2+ dorsalis pedis. AVF patent.  NERVOUS SYSTEM:  A&Ox3, no gross lateralizing deficits   SKIN: No rashes or lesions      LABS:                        9.3    9.00  )-----------( 132      ( 07 Oct 2022 07:26 )             30.8     10-07    134<L>  |  97  |  24<H>  ----------------------------<  74  5.0   |  24  |  5.66<H>    Ca    8.1<L>      07 Oct 2022 07:26  Phos  2.7     10-07  Mg     2.4     10-07    TPro  6.2  /  Alb  3.2<L>  /  TBili  1.2  /  DBili  x   /  AST  15  /  ALT  11  /  AlkPhos  58  10-07        No new micro or imaging

## 2022-10-09 LAB
ALBUMIN SERPL ELPH-MCNC: 3.5 G/DL — SIGNIFICANT CHANGE UP (ref 3.3–5)
ALP SERPL-CCNC: 59 U/L — SIGNIFICANT CHANGE UP (ref 40–120)
ALT FLD-CCNC: 11 U/L — SIGNIFICANT CHANGE UP (ref 10–45)
ANION GAP SERPL CALC-SCNC: 13 MMOL/L — SIGNIFICANT CHANGE UP (ref 5–17)
AST SERPL-CCNC: 20 U/L — SIGNIFICANT CHANGE UP (ref 10–40)
BASOPHILS # BLD AUTO: 0.16 K/UL — SIGNIFICANT CHANGE UP (ref 0–0.2)
BASOPHILS NFR BLD AUTO: 2 % — SIGNIFICANT CHANGE UP (ref 0–2)
BILIRUB SERPL-MCNC: 1.3 MG/DL — HIGH (ref 0.2–1.2)
BUN SERPL-MCNC: 27 MG/DL — HIGH (ref 7–23)
CALCIUM SERPL-MCNC: 8.2 MG/DL — LOW (ref 8.4–10.5)
CHLORIDE SERPL-SCNC: 100 MMOL/L — SIGNIFICANT CHANGE UP (ref 96–108)
CO2 SERPL-SCNC: 24 MMOL/L — SIGNIFICANT CHANGE UP (ref 22–31)
CREAT SERPL-MCNC: 5.9 MG/DL — HIGH (ref 0.5–1.3)
EGFR: 8 ML/MIN/1.73M2 — LOW
EOSINOPHIL # BLD AUTO: 0.14 K/UL — SIGNIFICANT CHANGE UP (ref 0–0.5)
EOSINOPHIL NFR BLD AUTO: 1.7 % — SIGNIFICANT CHANGE UP (ref 0–6)
GLUCOSE SERPL-MCNC: 143 MG/DL — HIGH (ref 70–99)
HCT VFR BLD CALC: 29.5 % — LOW (ref 34.5–45)
HGB BLD-MCNC: 9.3 G/DL — LOW (ref 11.5–15.5)
IMM GRANULOCYTES NFR BLD AUTO: 7.7 % — HIGH (ref 0–0.9)
LYMPHOCYTES # BLD AUTO: 0.84 K/UL — LOW (ref 1–3.3)
LYMPHOCYTES # BLD AUTO: 10.4 % — LOW (ref 13–44)
MAGNESIUM SERPL-MCNC: 2.5 MG/DL — SIGNIFICANT CHANGE UP (ref 1.6–2.6)
MCHC RBC-ENTMCNC: 29.8 PG — SIGNIFICANT CHANGE UP (ref 27–34)
MCHC RBC-ENTMCNC: 31.5 GM/DL — LOW (ref 32–36)
MCV RBC AUTO: 94.6 FL — SIGNIFICANT CHANGE UP (ref 80–100)
MONOCYTES # BLD AUTO: 0.45 K/UL — SIGNIFICANT CHANGE UP (ref 0–0.9)
MONOCYTES NFR BLD AUTO: 5.6 % — SIGNIFICANT CHANGE UP (ref 2–14)
NEUTROPHILS # BLD AUTO: 5.85 K/UL — SIGNIFICANT CHANGE UP (ref 1.8–7.4)
NEUTROPHILS NFR BLD AUTO: 72.6 % — SIGNIFICANT CHANGE UP (ref 43–77)
NRBC # BLD: 2 /100 WBCS — HIGH (ref 0–0)
PHOSPHATE SERPL-MCNC: 3.1 MG/DL — SIGNIFICANT CHANGE UP (ref 2.5–4.5)
PLATELET # BLD AUTO: 157 K/UL — SIGNIFICANT CHANGE UP (ref 150–400)
POTASSIUM SERPL-MCNC: 4.2 MMOL/L — SIGNIFICANT CHANGE UP (ref 3.5–5.3)
POTASSIUM SERPL-SCNC: 4.2 MMOL/L — SIGNIFICANT CHANGE UP (ref 3.5–5.3)
PROT SERPL-MCNC: 6.4 G/DL — SIGNIFICANT CHANGE UP (ref 6–8.3)
RBC # BLD: 3.12 M/UL — LOW (ref 3.8–5.2)
RBC # FLD: 18.6 % — HIGH (ref 10.3–14.5)
SODIUM SERPL-SCNC: 137 MMOL/L — SIGNIFICANT CHANGE UP (ref 135–145)
WBC # BLD: 8.06 K/UL — SIGNIFICANT CHANGE UP (ref 3.8–10.5)
WBC # FLD AUTO: 8.06 K/UL — SIGNIFICANT CHANGE UP (ref 3.8–10.5)

## 2022-10-09 PROCEDURE — 99232 SBSQ HOSP IP/OBS MODERATE 35: CPT | Mod: GC

## 2022-10-09 RX ORDER — ERYTHROPOIETIN 10000 [IU]/ML
20000 INJECTION, SOLUTION INTRAVENOUS; SUBCUTANEOUS
Qty: 0 | Refills: 0 | DISCHARGE
Start: 2022-10-09

## 2022-10-09 RX ADMIN — CHLORHEXIDINE GLUCONATE 1 APPLICATION(S): 213 SOLUTION TOPICAL at 12:15

## 2022-10-09 RX ADMIN — Medication 1 TABLET(S): at 12:15

## 2022-10-09 RX ADMIN — CARVEDILOL PHOSPHATE 3.12 MILLIGRAM(S): 80 CAPSULE, EXTENDED RELEASE ORAL at 06:40

## 2022-10-09 RX ADMIN — PIPERACILLIN AND TAZOBACTAM 25 GRAM(S): 4; .5 INJECTION, POWDER, LYOPHILIZED, FOR SOLUTION INTRAVENOUS at 00:43

## 2022-10-09 NOTE — PROGRESS NOTE ADULT - SUBJECTIVE AND OBJECTIVE BOX
PROGRESS NOTE:   Authored by: Atul Conroy M.D.   Internal Medicine PGY-1  Please Contact Via Teams    Patient is a 62y old  Female who presents with a chief complaint of Abdominal pain (08 Oct 2022 07:09)      SUBJECTIVE / OVERNIGHT EVENTS:  No acute events overnight.     ADDITIONAL REVIEW OF SYSTEMS:  Patient denies fevers, chills, chest pain, shortness of breath, nausea, abdominal pain, diarrhea, constipation, dysuria, leg swelling, headache, light headedness.    MEDICATIONS  (STANDING):  carvedilol 3.125 milliGRAM(s) Oral every 12 hours  chlorhexidine 2% Cloths 1 Application(s) Topical daily  epoetin filiberto-epbx (RETACRIT) Injectable 65617 Unit(s) IV Push <User Schedule>  multivitamin 1 Tablet(s) Oral daily  Nephro-deonna 1 Tablet(s) Oral daily  piperacillin/tazobactam IVPB.. 3.375 Gram(s) IV Intermittent every 12 hours  ruxolitinib 10 milliGRAM(s) Oral <User Schedule>    MEDICATIONS  (PRN):  aluminum hydroxide/magnesium hydroxide/simethicone Suspension 30 milliLiter(s) Oral every 4 hours PRN Dyspepsia  HYDROmorphone  Injectable 0.4 milliGRAM(s) IV Push every 4 hours PRN Pain      CAPILLARY BLOOD GLUCOSE        I&O's Summary    08 Oct 2022 07:01  -  09 Oct 2022 07:00  --------------------------------------------------------  IN: 480 mL / OUT: 0 mL / NET: 480 mL        PHYSICAL EXAM:  Vital Signs Last 24 Hrs  T(C): 36.9 (09 Oct 2022 04:00), Max: 36.9 (09 Oct 2022 04:00)  T(F): 98.4 (09 Oct 2022 04:00), Max: 98.4 (09 Oct 2022 04:00)  HR: 61 (09 Oct 2022 06:39) (60 - 65)  BP: 123/67 (09 Oct 2022 06:39) (123/67 - 137/76)  BP(mean): --  RR: 18 (09 Oct 2022 04:00) (18 - 18)  SpO2: 100% (09 Oct 2022 04:00) (96% - 100%)    Parameters below as of 09 Oct 2022 04:00  Patient On (Oxygen Delivery Method): room air        CONSTITUTIONAL: NAD, well-developed  RESPIRATORY: Normal respiratory effort; lungs are clear to auscultation bilaterally  CARDIOVASCULAR: Regular rate and rhythm, normal S1 and S2, no murmur/rub/gallop; No lower extremity edema; Peripheral pulses are 2+ bilaterally  ABDOMEN: Nontender to palpation, normoactive bowel sounds, no rebound/guarding; No hepatosplenomegaly  MUSCLOSKELETAL: no clubbing or cyanosis of digits; no joint swelling or tenderness to palpation  PSYCH: A+O to person, place, and time; affect appropriate    LABS:                        9.7    8.55  )-----------( 154      ( 08 Oct 2022 06:28 )             30.9     10-08    135  |  96  |  14  ----------------------------<  75  4.0   |  25  |  4.24<H>    Ca    8.0<L>      08 Oct 2022 06:34  Phos  2.7     10-08  Mg     2.3     10-08    TPro  6.2  /  Alb  3.3  /  TBili  1.4<H>  /  DBili  x   /  AST  22  /  ALT  13  /  AlkPhos  62  10-08                Tele Reviewed:    RADIOLOGY & ADDITIONAL TESTS:  Results Reviewed:   Imaging Personally Reviewed:  Electrocardiogram Personally Reviewed:     PROGRESS NOTE:   Authored by: Atul Conroy M.D.   Internal Medicine PGY-1  Please Contact Via Teams    Patient is a 62y old  Female who presents with a chief complaint of Abdominal pain (08 Oct 2022 07:09)      SUBJECTIVE / OVERNIGHT EVENTS:  Pt reports was tolerating diet well yesterday. Last night she had an episode of epigastric abdominal pain that radiated to the right flank at 21:00. She received 1 push of hydromorphone.    MEDICATIONS  (STANDING):  carvedilol 3.125 milliGRAM(s) Oral every 12 hours  chlorhexidine 2% Cloths 1 Application(s) Topical daily  epoetin filiberto-epbx (RETACRIT) Injectable 80359 Unit(s) IV Push <User Schedule>  multivitamin 1 Tablet(s) Oral daily  Nephro-deonna 1 Tablet(s) Oral daily  piperacillin/tazobactam IVPB.. 3.375 Gram(s) IV Intermittent every 12 hours  ruxolitinib 10 milliGRAM(s) Oral <User Schedule>    MEDICATIONS  (PRN):  aluminum hydroxide/magnesium hydroxide/simethicone Suspension 30 milliLiter(s) Oral every 4 hours PRN Dyspepsia  HYDROmorphone  Injectable 0.4 milliGRAM(s) IV Push every 4 hours PRN Pain      CAPILLARY BLOOD GLUCOSE        I&O's Summary    08 Oct 2022 07:01  -  09 Oct 2022 07:00  --------------------------------------------------------  IN: 480 mL / OUT: 0 mL / NET: 480 mL        PHYSICAL EXAM:  Vital Signs Last 24 Hrs  T(C): 36.9 (09 Oct 2022 04:00), Max: 36.9 (09 Oct 2022 04:00)  T(F): 98.4 (09 Oct 2022 04:00), Max: 98.4 (09 Oct 2022 04:00)  HR: 61 (09 Oct 2022 06:39) (60 - 65)  BP: 123/67 (09 Oct 2022 06:39) (123/67 - 137/76)  BP(mean): --  RR: 18 (09 Oct 2022 04:00) (18 - 18)  SpO2: 100% (09 Oct 2022 04:00) (96% - 100%)    Parameters below as of 09 Oct 2022 04:00  Patient On (Oxygen Delivery Method): room air      GENERAL: NAD  EYES: EOMI, PERRLA,  ENT: Moist mucous membranes  NECK: Supple, No JVD  CHEST/LUNG: Clear to auscultation bilaterally; Unlabored respirations  HEART: Regular rate and rhythm; No murmurs. Chest tender to palpation  Peripheral edema: none  ABDOMEN: RUQ with hightower sign and epigastric tenderness. Splenomegaly  EXTREMITIES:  2+ radial pulses, 2+ dorsalis pedis. AVF patent.  NERVOUS SYSTEM:  A&Ox3, no gross lateralizing deficits   SKIN: No rashes or lesions      LABS:                        9.7    8.55  )-----------( 154      ( 08 Oct 2022 06:28 )             30.9     10-08    135  |  96  |  14  ----------------------------<  75  4.0   |  25  |  4.24<H>    Ca    8.0<L>      08 Oct 2022 06:34  Phos  2.7     10-08  Mg     2.3     10-08    TPro  6.2  /  Alb  3.3  /  TBili  1.4<H>  /  DBili  x   /  AST  22  /  ALT  13  /  AlkPhos  62  10-08            No new micro or imaging

## 2022-10-09 NOTE — PROGRESS NOTE ADULT - ASSESSMENT
61 yo F hx of myelofibrosis, polycythemia vera, ESRD on MFW schedule via LUE AVF (last dialysis W), portal HTN, subclavian port for transfusions, gastric varices, gallstones, recent ERCP for choledocolithiasis s/p biliary stent, c/b post ERCP pancreatitis now presenting with fever and RUQ and epigastric pain. NM Hepatobiliary study positive for acute cholecystitis. MRCP + for acute vikas. However, US RUQ not consistent with acute cholecystitis. May likely have pancreatitis given elevated lipase and severe abdominal pain. No inpatient intervention as per surgery.

## 2022-10-09 NOTE — PROGRESS NOTE ADULT - ATTENDING COMMENTS
63 yo F hx of myelofibrosis, polycythemia vera, ESRD on MWF schedule via LUE AVF (last dialysis W), portal HTN, subclavian port for transfusions, gastric varices, gallstones, recent ERCP for choledocholithiasis s/p biliary stent, c/b post ERCP pancreatitis + pneumobilia now presenting with fever and RUQ and epigastric pain 2/2 cholecystitis.    #RUQ and epigastric abdominal pain  #?pancreatitis  #ESRD  #gastric varices/portal HTN  #myelofibrosis  #PCV  #hx of ERCP pancreatitis    - Patient evaluated at bedside with  present, with slight residual right sided abdominal pain, but otherwise well.   - Patient transitioned to and tolerating solid food relatively well without significant pain, will continue to monitor. Per consultants, no inpatient intervention; patient advised to follow-up outpatient for continued evaluation for cholecystectomy.   - s/p antibiotics   - CM/SW to reestablish dialysis services early next week; anticipate patient will receive HD 10/10 early before discharge.  - outpatient hepatology/GI, surgery follow up

## 2022-10-09 NOTE — PROGRESS NOTE ADULT - PROBLEM SELECTOR PLAN 3
- Bi monthly transfusions at [facility]. Followed by Dr. Benjy Guo  - US doppler abdomen no portal or splenic vein thrombosis.  - Liver biopsy with nodular regenerative hyperplasia  - Hgb 7.0 baseline high 8's/low 9's based on outpatient labs  - Continue Danazol  - Heme following  - s/p 2unit PRBC early in admission - Bi monthly transfusions at [facility]. Followed by Dr. Benjy Guo  - US doppler abdomen no portal or splenic vein thrombosis.  - Liver biopsy with nodular regenerative hyperplasia  - Continue Danazol  - Heme following  - s/p 2unit PRBC early in admission, Hgb now stable and at baseline

## 2022-10-09 NOTE — PROGRESS NOTE ADULT - PROBLEM SELECTOR PLAN 1
- Last admission pt with cholelithiasis and choledocholithiasis. Had ERCP with stone removal c/b post procedure pancreatitis that delayed surgery. Cholelithiasis and a focus of antidependent air seen on CT. Gallbladder wall thickening/edema on CT from 9/26. May be acute cholecystitis and pancreatitis. Abdominal pain improved today from fluids and bowel rest; currently on abx  - HIDA scan positive for cholecystitis  - MRCP with mildly distended gallbladder with nonspecific diffuse wall thickening and   air from instrumentation, possible vikas  -US RUQ indicates no acute cholecystitis 10/5  - Lipase also elevated at 257. Repeat today ~160's  - Patient without elevated WBC but has myelofibrosis. afebrile. BCx NGTD.   - Empiric zosyn 9/30-->10/8  - Low fat diet.  - Given high surgical risk, no operation inpatient and patient should follow with general and transplant surgery outpatient - Last admission pt with cholelithiasis and choledocholithiasis. Had ERCP with stone removal c/b post procedure pancreatitis that delayed surgery. Cholelithiasis and a focus of antidependent air seen on CT. Gallbladder wall thickening/edema on CT from 9/26. May be acute cholecystitis and pancreatitis. Abdominal pain improved today from fluids and bowel rest; currently on abx  - HIDA scan positive for cholecystitis  - MRCP with mildly distended gallbladder with nonspecific diffuse wall thickening and   air from instrumentation, possible vikas  -US RUQ indicates no acute cholecystitis 10/5  - Lipase also elevated at 257. Repeat today ~160's  - Patient without elevated WBC but has myelofibrosis. afebrile. BCx NGTD.   - Empiric zosyn 9/30-->10/9 last dose in am  - Low fat diet.  - Given high surgical risk, no operation inpatient and patient should follow with general and transplant surgery outpatient

## 2022-10-09 NOTE — PROGRESS NOTE ADULT - PROBLEM SELECTOR PLAN 6
DVT proph: SCD  Dispo: Patient to try renal and low fat diet today and if tolerates can be d/c. Pending reinstatement of dialysis but unlikely to happen over the weekend so she might have to go on monday after dialysis. DVT proph: SCD  Dispo: Patient tolerating low fat renal diet. Pending reinstatement of dialysis but unlikely to happen over the weekend so she might have to go on monday after dialysis. Is first on the schedule for monday

## 2022-10-10 LAB
ALBUMIN SERPL ELPH-MCNC: 3.7 G/DL — SIGNIFICANT CHANGE UP (ref 3.3–5)
ALP SERPL-CCNC: 69 U/L — SIGNIFICANT CHANGE UP (ref 40–120)
ALT FLD-CCNC: 14 U/L — SIGNIFICANT CHANGE UP (ref 10–45)
ANION GAP SERPL CALC-SCNC: 15 MMOL/L — SIGNIFICANT CHANGE UP (ref 5–17)
AST SERPL-CCNC: 22 U/L — SIGNIFICANT CHANGE UP (ref 10–40)
BASOPHILS # BLD AUTO: 0.18 K/UL — SIGNIFICANT CHANGE UP (ref 0–0.2)
BASOPHILS NFR BLD AUTO: 1.8 % — SIGNIFICANT CHANGE UP (ref 0–2)
BILIRUB SERPL-MCNC: 1.3 MG/DL — HIGH (ref 0.2–1.2)
BUN SERPL-MCNC: 34 MG/DL — HIGH (ref 7–23)
CALCIUM SERPL-MCNC: 8.2 MG/DL — LOW (ref 8.4–10.5)
CHLORIDE SERPL-SCNC: 103 MMOL/L — SIGNIFICANT CHANGE UP (ref 96–108)
CO2 SERPL-SCNC: 19 MMOL/L — LOW (ref 22–31)
CREAT SERPL-MCNC: 6.89 MG/DL — HIGH (ref 0.5–1.3)
EGFR: 6 ML/MIN/1.73M2 — LOW
EOSINOPHIL # BLD AUTO: 0.2 K/UL — SIGNIFICANT CHANGE UP (ref 0–0.5)
EOSINOPHIL NFR BLD AUTO: 2 % — SIGNIFICANT CHANGE UP (ref 0–6)
GLUCOSE SERPL-MCNC: 90 MG/DL — SIGNIFICANT CHANGE UP (ref 70–99)
HCT VFR BLD CALC: 31.1 % — LOW (ref 34.5–45)
HGB BLD-MCNC: 9.6 G/DL — LOW (ref 11.5–15.5)
IMM GRANULOCYTES NFR BLD AUTO: 6.2 % — HIGH (ref 0–0.9)
LYMPHOCYTES # BLD AUTO: 0.9 K/UL — LOW (ref 1–3.3)
LYMPHOCYTES # BLD AUTO: 9 % — LOW (ref 13–44)
MAGNESIUM SERPL-MCNC: 2.6 MG/DL — SIGNIFICANT CHANGE UP (ref 1.6–2.6)
MCHC RBC-ENTMCNC: 29.5 PG — SIGNIFICANT CHANGE UP (ref 27–34)
MCHC RBC-ENTMCNC: 30.9 GM/DL — LOW (ref 32–36)
MCV RBC AUTO: 95.7 FL — SIGNIFICANT CHANGE UP (ref 80–100)
MONOCYTES # BLD AUTO: 0.55 K/UL — SIGNIFICANT CHANGE UP (ref 0–0.9)
MONOCYTES NFR BLD AUTO: 5.5 % — SIGNIFICANT CHANGE UP (ref 2–14)
NEUTROPHILS # BLD AUTO: 7.54 K/UL — HIGH (ref 1.8–7.4)
NEUTROPHILS NFR BLD AUTO: 75.5 % — SIGNIFICANT CHANGE UP (ref 43–77)
NRBC # BLD: 2 /100 WBCS — HIGH (ref 0–0)
PHOSPHATE SERPL-MCNC: 3.4 MG/DL — SIGNIFICANT CHANGE UP (ref 2.5–4.5)
PLATELET # BLD AUTO: 168 K/UL — SIGNIFICANT CHANGE UP (ref 150–400)
POTASSIUM SERPL-MCNC: 4.6 MMOL/L — SIGNIFICANT CHANGE UP (ref 3.5–5.3)
POTASSIUM SERPL-SCNC: 4.6 MMOL/L — SIGNIFICANT CHANGE UP (ref 3.5–5.3)
PROT SERPL-MCNC: 6.8 G/DL — SIGNIFICANT CHANGE UP (ref 6–8.3)
RBC # BLD: 3.25 M/UL — LOW (ref 3.8–5.2)
RBC # FLD: 18.9 % — HIGH (ref 10.3–14.5)
SODIUM SERPL-SCNC: 137 MMOL/L — SIGNIFICANT CHANGE UP (ref 135–145)
WBC # BLD: 9.99 K/UL — SIGNIFICANT CHANGE UP (ref 3.8–10.5)
WBC # FLD AUTO: 9.99 K/UL — SIGNIFICANT CHANGE UP (ref 3.8–10.5)

## 2022-10-10 PROCEDURE — 90935 HEMODIALYSIS ONE EVALUATION: CPT | Mod: GC

## 2022-10-10 PROCEDURE — 99233 SBSQ HOSP IP/OBS HIGH 50: CPT | Mod: GC

## 2022-10-10 RX ORDER — HYDROMORPHONE HYDROCHLORIDE 2 MG/ML
0.5 INJECTION INTRAMUSCULAR; INTRAVENOUS; SUBCUTANEOUS
Qty: 4.5 | Refills: 0
Start: 2022-10-10 | End: 2022-10-12

## 2022-10-10 RX ORDER — HYDROMORPHONE HYDROCHLORIDE 2 MG/ML
0.5 INJECTION INTRAMUSCULAR; INTRAVENOUS; SUBCUTANEOUS
Qty: 2 | Refills: 0
Start: 2022-10-10 | End: 2022-10-11

## 2022-10-10 RX ADMIN — HYDROMORPHONE HYDROCHLORIDE 0.4 MILLIGRAM(S): 2 INJECTION INTRAMUSCULAR; INTRAVENOUS; SUBCUTANEOUS at 05:03

## 2022-10-10 RX ADMIN — Medication 1 TABLET(S): at 15:32

## 2022-10-10 RX ADMIN — CARVEDILOL PHOSPHATE 3.12 MILLIGRAM(S): 80 CAPSULE, EXTENDED RELEASE ORAL at 05:00

## 2022-10-10 RX ADMIN — HYDROMORPHONE HYDROCHLORIDE 0.4 MILLIGRAM(S): 2 INJECTION INTRAMUSCULAR; INTRAVENOUS; SUBCUTANEOUS at 06:35

## 2022-10-10 RX ADMIN — Medication 30 MILLILITER(S): at 08:26

## 2022-10-10 RX ADMIN — RUXOLITINIB 10 MILLIGRAM(S): 25 TABLET ORAL at 21:15

## 2022-10-10 RX ADMIN — Medication 1 TABLET(S): at 15:31

## 2022-10-10 RX ADMIN — ERYTHROPOIETIN 20000 UNIT(S): 10000 INJECTION, SOLUTION INTRAVENOUS; SUBCUTANEOUS at 09:13

## 2022-10-10 RX ADMIN — CARVEDILOL PHOSPHATE 3.12 MILLIGRAM(S): 80 CAPSULE, EXTENDED RELEASE ORAL at 17:58

## 2022-10-10 RX ADMIN — CHLORHEXIDINE GLUCONATE 1 APPLICATION(S): 213 SOLUTION TOPICAL at 15:32

## 2022-10-10 NOTE — PROGRESS NOTE ADULT - ATTENDING COMMENTS
61 yo F hx of myelofibrosis, polycythemia vera, ESRD on MWF schedule via LUE AVF (last dialysis W), portal HTN, subclavian port for transfusions, gastric varices, gallstones, recent ERCP for choledocholithiasis s/p biliary stent, c/b post ERCP pancreatitis + pneumobilia now presenting with fever and RUQ and epigastric pain 2/2 cholecystitis. Pain improved, completed antibiotics, however social dispo issue as patient cannot follow up with Sx outpt, awaiting surgical re-eval.     #RUQ and epigastric abdominal pain  #?pancreatitis  #ESRD  #gastric varices/portal HTN  #myelofibrosis  #PCV  #hx of ERCP pancreatitis    - Patient evaluated at bedside, daughter on phone providing translation per patient request, improved abdominal pain  - Patient tolerating solid food relatively well without significant pain, will continue to monitor. Per consultants, no inpatient intervention; patient advised to follow-up outpatient for continued evaluation for cholecystectomy--however d/t insurance cannot be seen outpt by surg, surgery to re-evaluate in AM .   - s/p antibiotics   - CM/SW to reestablish dialysis services early next week; initially was set to resume outpt HD Weds, however pending surgical re-eval for final dc planning.  -needs outpt ERCP in 2 months to remove stent  -can trial PO pain medications and dc IV dilaudid    d/w HS 6    Sylvia Escobedo MD  Division of Hospital Medicine  Pager: [phone] or reachable on MS Teams  After hours please page [phone]

## 2022-10-10 NOTE — PROGRESS NOTE ADULT - PROBLEM SELECTOR PLAN 6
DVT proph: SCD  Dispo: Patient tolerating low fat renal diet. Pending reinstatement of dialysis but unlikely to happen over the weekend so she might have to go on monday after dialysis. Is first on the schedule for monday

## 2022-10-10 NOTE — PROGRESS NOTE ADULT - ATTENDING COMMENTS
Seen on maintenance HD. Tolerating well. BP stable and AVF functioning well during HD. Plan for next maintenance HD on Wednesday. Monitor BMP.

## 2022-10-10 NOTE — PROGRESS NOTE ADULT - PROBLEM SELECTOR PLAN 3
Recently phos has been low. Please hold phoslo for now  Obtain phosphorus and calcium levels with BMPs    If you have any questions, please feel free to contact me  Keshawn Farfan  Nephrology Fellow  [phone]; Prefer [employer] TEAMS  (After 5pm or on weekends please page the on-call fellow).    Patient was discussed with Dr. Fajardo who agrees with my A/P. Addendum to follow

## 2022-10-10 NOTE — PROGRESS NOTE ADULT - PROBLEM SELECTOR PLAN 1
- Last admission pt with cholelithiasis and choledocholithiasis. Had ERCP with stone removal c/b post procedure pancreatitis that delayed surgery. Cholelithiasis and a focus of antidependent air seen on CT. Gallbladder wall thickening/edema on CT from 9/26. May be acute cholecystitis and pancreatitis. Abdominal pain improved today from fluids and bowel rest; currently on abx  - HIDA scan positive for cholecystitis  - MRCP with mildly distended gallbladder with nonspecific diffuse wall thickening and   air from instrumentation, possible vikas  -US RUQ indicates no acute cholecystitis 10/5  - Lipase also elevated at 257. Repeat today ~160's  - Patient without elevated WBC but has myelofibrosis. afebrile. BCx NGTD.   - Empiric zosyn 9/30-->10/9 last dose in am  - Low fat diet.  - Given high surgical risk, no operation inpatient and patient should follow with general and transplant surgery outpatient - Last admission pt with cholelithiasis and choledocholithiasis. Had ERCP with stone removal c/b post procedure pancreatitis that delayed surgery. Cholelithiasis and a focus of antidependent air seen on CT. Gallbladder wall thickening/edema on CT from 9/26. May be acute cholecystitis and pancreatitis. Abdominal pain improved today from fluids and bowel rest; currently on abx  - HIDA scan positive for cholecystitis  - MRCP with mildly distended gallbladder with nonspecific diffuse wall thickening and   air from instrumentation, possible vikas  -US RUQ indicates no acute cholecystitis 10/5  - Lipase also elevated at 257. Repeat today ~160's  - Patient without elevated WBC but has myelofibrosis. afebrile. BCx NGTD.   - Empiric zosyn 9/30-->10/9 last dose in am  - Low fat diet.  - Talked with transplant [facility], was told that they don't accept the emergency medicaid. and relayed the message to the transplant team, they now will reevaluate her rose 10/11.

## 2022-10-10 NOTE — PROGRESS NOTE ADULT - SUBJECTIVE AND OBJECTIVE BOX
PROGRESS NOTE:   Authored by Dr. Juarez Can  Patient is a 62y old  Female who presents with a chief complaint of Abdominal pain (10 Oct 2022 08:59)      SUBJECTIVE / OVERNIGHT EVENTS:  Pain well controlled, occasionally stabbing and shooting pain. BM normal.     ADDITIONAL REVIEW OF SYSTEMS:  Review of Systems:  Constitutional: No fever, No weight loss, good appetite/po intake  Head: No headache   Eyes: No blurry vision, No diplopia  Neuro: No tremors, No muscle weakness   Cardiovascular: No chest pain, No palpitations  Respiratory: No SOB, No cough  GI: No nausea, No vomiting, No diarrhea  : No dysuria, No hematuria  Skin: No rash  MSK: No joint pain   Psych: No depression  Heme: No abnormal bruising, no abnormal bleeding      MEDICATIONS  (STANDING):  carvedilol 3.125 milliGRAM(s) Oral every 12 hours  chlorhexidine 2% Cloths 1 Application(s) Topical daily  epoetin filiberto-epbx (RETACRIT) Injectable 41311 Unit(s) IV Push <User Schedule>  multivitamin 1 Tablet(s) Oral daily  Nephro-deonna 1 Tablet(s) Oral daily  ruxolitinib 10 milliGRAM(s) Oral <User Schedule>    MEDICATIONS  (PRN):  aluminum hydroxide/magnesium hydroxide/simethicone Suspension 30 milliLiter(s) Oral every 4 hours PRN Dyspepsia  HYDROmorphone  Injectable 0.4 milliGRAM(s) IV Push every 4 hours PRN Pain      CAPILLARY BLOOD GLUCOSE        I&O's Summary    09 Oct 2022 07:01  -  10 Oct 2022 07:00  --------------------------------------------------------  IN: 578 mL / OUT: 0 mL / NET: 578 mL    10 Oct 2022 07:01  -  10 Oct 2022 16:32  --------------------------------------------------------  IN: 220 mL / OUT: 1000 mL / NET: -780 mL        PHYSICAL EXAM:  Vital Signs Last 24 Hrs  T(C): 36.4 (10 Oct 2022 11:45), Max: 36.7 (09 Oct 2022 19:57)  T(F): 97.5 (10 Oct 2022 11:45), Max: 98.1 (09 Oct 2022 19:57)  HR: 66 (10 Oct 2022 11:45) (57 - 68)  BP: 143/77 (10 Oct 2022 11:45) (120/76 - 143/81)  BP(mean): --  RR: 18 (10 Oct 2022 11:45) (17 - 18)  SpO2: 98% (10 Oct 2022 11:45) (98% - 98%)    Parameters below as of 10 Oct 2022 11:45  Patient On (Oxygen Delivery Method): room air        GENERAL: NAD, lying comfortably in bed  HEAD: Atraumatic, normocephalic  EYES: EOMI b/l PERRLA b/l, conjunctiva and sclera clear  NECK: Supple, No JVD, No LAD  RESPIRATORY: Normal respiratory effort; lungs are clear to auscultation bilaterally  CARDIOVASCULAR: Regular rate and rhythm, normal S1 and S2, no murmur/rub/gallop; No lower extremity edema  ABDOMEN: tender to palpation in RUQ, LUQ. Nontender, normoactive bowel sounds, no rebound/guarding; No hepatosplenomegaly  MUSCULOSKELETAL: no clubbing or cyanosis of digits; no joint swelling or tenderness to palpation  NEURO: Non focal   PSYCH: A+O to person, place, and time; affect appropriate    LABS:                        9.6    9.99  )-----------( 168      ( 10 Oct 2022 06:29 )             31.1     10-10    137  |  103  |  34<H>  ----------------------------<  90  4.6   |  19<L>  |  6.89<H>    Ca    8.2<L>      10 Oct 2022 06:29  Phos  3.4     10-10  Mg     2.6     10-10    TPro  6.8  /  Alb  3.7  /  TBili  1.3<H>  /  DBili  x   /  AST  22  /  ALT  14  /  AlkPhos  69  10-10               PROGRESS NOTE:   Authored by Dr. Juarez Can  Patient is a 62y old  Female who presents with a chief complaint of Abdominal pain (10 Oct 2022 08:59)      SUBJECTIVE / OVERNIGHT EVENTS:  Pain well controlled, occasionally stabbing and shooting pain. BM normal. Overall feeling improved    ADDITIONAL REVIEW OF SYSTEMS:  Review of Systems:  Constitutional: No fever, No weight loss, good appetite/po intake  Head: No headache   Eyes: No blurry vision, No diplopia  Neuro: No tremors, No muscle weakness   Cardiovascular: No chest pain, No palpitations  Respiratory: No SOB, No cough  GI: No nausea, No vomiting, No diarrhea  : No dysuria, No hematuria  Skin: No rash  MSK: No joint pain   Psych: No depression  Heme: No abnormal bruising, no abnormal bleeding      MEDICATIONS  (STANDING):  carvedilol 3.125 milliGRAM(s) Oral every 12 hours  chlorhexidine 2% Cloths 1 Application(s) Topical daily  epoetin filiberto-epbx (RETACRIT) Injectable 32640 Unit(s) IV Push <User Schedule>  multivitamin 1 Tablet(s) Oral daily  Nephro-deonna 1 Tablet(s) Oral daily  ruxolitinib 10 milliGRAM(s) Oral <User Schedule>    MEDICATIONS  (PRN):  aluminum hydroxide/magnesium hydroxide/simethicone Suspension 30 milliLiter(s) Oral every 4 hours PRN Dyspepsia  HYDROmorphone  Injectable 0.4 milliGRAM(s) IV Push every 4 hours PRN Pain      CAPILLARY BLOOD GLUCOSE        I&O's Summary    09 Oct 2022 07:01  -  10 Oct 2022 07:00  --------------------------------------------------------  IN: 578 mL / OUT: 0 mL / NET: 578 mL    10 Oct 2022 07:01  -  10 Oct 2022 16:32  --------------------------------------------------------  IN: 220 mL / OUT: 1000 mL / NET: -780 mL        PHYSICAL EXAM:  Vital Signs Last 24 Hrs  T(C): 36.4 (10 Oct 2022 11:45), Max: 36.7 (09 Oct 2022 19:57)  T(F): 97.5 (10 Oct 2022 11:45), Max: 98.1 (09 Oct 2022 19:57)  HR: 66 (10 Oct 2022 11:45) (57 - 68)  BP: 143/77 (10 Oct 2022 11:45) (120/76 - 143/81)  BP(mean): --  RR: 18 (10 Oct 2022 11:45) (17 - 18)  SpO2: 98% (10 Oct 2022 11:45) (98% - 98%)    Parameters below as of 10 Oct 2022 11:45  Patient On (Oxygen Delivery Method): room air        GENERAL: NAD, lying comfortably in bed  HEAD: Atraumatic, normocephalic  EYES: EOMI b/l PERRLA b/l, conjunctiva and sclera clear  NECK: Supple, No JVD, No LAD  RESPIRATORY: Normal respiratory effort; lungs are clear to auscultation bilaterally  CARDIOVASCULAR: Regular rate and rhythm, normal S1 and S2, no murmur/rub/gallop; No lower extremity edema  ABDOMEN: tender to palpation in RUQ, LUQ. Nontender, normoactive bowel sounds, no rebound/guarding; No hepatosplenomegaly  MUSCULOSKELETAL: no clubbing or cyanosis of digits; no joint swelling or tenderness to palpation  NEURO: Non focal   PSYCH: A+O to person, place, and time; affect appropriate    LABS:                        9.6    9.99  )-----------( 168      ( 10 Oct 2022 06:29 )             31.1     10-10    137  |  103  |  34<H>  ----------------------------<  90  4.6   |  19<L>  |  6.89<H>    Ca    8.2<L>      10 Oct 2022 06:29  Phos  3.4     10-10  Mg     2.6     10-10    TPro  6.8  /  Alb  3.7  /  TBili  1.3<H>  /  DBili  x   /  AST  22  /  ALT  14  /  AlkPhos  69  10-10

## 2022-10-10 NOTE — PROGRESS NOTE ADULT - PROBLEM SELECTOR PLAN 3
- Bi monthly transfusions at [facility]. Followed by Dr. Benjy Guo  - US doppler abdomen no portal or splenic vein thrombosis.  - Liver biopsy with nodular regenerative hyperplasia  - Continue Danazol  - Heme following  - s/p 2unit PRBC early in admission, Hgb now stable and at baseline

## 2022-10-10 NOTE — PROGRESS NOTE ADULT - SUBJECTIVE AND OBJECTIVE BOX
[facility] Division of Kidney Diseases & Hypertension  FOLLOW UP NOTE  [phone]--------------------------------------------------------------------------------  Chief Complaint:Abdominal pain    24 hour events/subjective:  Plan for HD today. Per chart review, patient to be discharged after HD today      PAST HISTORY  --------------------------------------------------------------------------------  No significant changes to PMH, PSH, FHx, SHx, unless otherwise noted    ALLERGIES & MEDICATIONS  --------------------------------------------------------------------------------  Allergies    No Known Allergies    Intolerances      Standing Inpatient Medications  carvedilol 3.125 milliGRAM(s) Oral every 12 hours  chlorhexidine 2% Cloths 1 Application(s) Topical daily  epoetin filiberto-epbx (RETACRIT) Injectable 60290 Unit(s) IV Push <User Schedule>  multivitamin 1 Tablet(s) Oral daily  Nephro-deonna 1 Tablet(s) Oral daily  ruxolitinib 10 milliGRAM(s) Oral <User Schedule>    PRN Inpatient Medications  aluminum hydroxide/magnesium hydroxide/simethicone Suspension 30 milliLiter(s) Oral every 4 hours PRN  HYDROmorphone  Injectable 0.4 milliGRAM(s) IV Push every 4 hours PRN      REVIEW OF SYSTEMS  --------------------------------------------------------------------------------  Gen: No  fevers/chills  Skin: No rashes  Head/Eyes/Ears/Mouth: No headache; Normal hearing; Normal vision w/o blurriness  Respiratory: No dyspnea, cough, wheezing, hemoptysis  CV: No chest pain, PND, orthopnea  GI: + abdominal pain,- diarrhea, constipation, nausea, vomiting  : No increased frequency, dysuria, hematuria, nocturia  MSK: No joint pain/swelling; no back pain; no edema  Neuro: No dizziness/lightheadedness, weakness, seizures, numbness, tingling    All other systems were reviewed and are negative, except as noted.    VITALS/PHYSICAL EXAM  --------------------------------------------------------------------------------  T(C): 36.5 (10-10-22 @ 08:45), Max: 36.7 (10-09-22 @ 12:14)  HR: 68 (10-10-22 @ 08:45) (57 - 68)  BP: 143/81 (10-10-22 @ 08:45) (120/76 - 144/73)  RR: 17 (10-10-22 @ 08:45) (17 - 18)  SpO2: 98% (10-10-22 @ 08:45) (98% - 98%)  Wt(kg): --        10-09-22 @ 07:01  -  10-10-22 @ 07:00  --------------------------------------------------------  IN: 578 mL / OUT: 0 mL / NET: 578 mL      Physical Exam:  	Gen: NAD, uncomfortable   	HEENT:  supple neck, clear oropharynx  	Pulm: CTA B/L  	CV: RRR, S1S2; no rub  	Back: No spinal or CVA tenderness; no sacral edema  	Abd: +BS, soft, epigastric tenderness   	: No suprapubic tenderness  	LE: Warm, FROM, no clubbing, intact strength; no edema  	Neuro: No focal deficits, intact gait  	Psych: Normal affect and mood  	Skin: Warm, without rashes  	Vascular access: LUE Fistula without aneurysmal dilatation      LABS/STUDIES  --------------------------------------------------------------------------------              9.6    9.99  >-----------<  168      [10-10-22 @ 06:29]              31.1     137  |  103  |  34  ----------------------------<  90      [10-10-22 @ 06:29]  4.6   |  19  |  6.89        Ca     8.2     [10-10-22 @ 06:29]      Mg     2.6     [10-10-22 @ 06:29]      Phos  3.4     [10-10-22 @ 06:29]    TPro  6.8  /  Alb  3.7  /  TBili  1.3  /  DBili  x   /  AST  22  /  ALT  14  /  AlkPhos  69  [10-10-22 @ 06:29]          Creatinine Trend:  SCr 6.89 [10-10 @ 06:29]  SCr 5.90 [10-09 @ 07:41]  SCr 4.24 [10-08 @ 06:34]  SCr 5.66 [10-07 @ 07:26]  SCr 4.02 [10-06 @ 06:12]

## 2022-10-10 NOTE — PROGRESS NOTE ADULT - PROBLEM SELECTOR PLAN 2
- MWF via AVF left arm  - Nephrology following. EPO post dialysis  - Jakafi post dialysis. Nephro-deonna. Holding calcium acetate due to low phos.   - Renal diet - MWF via AVF left arm as scheduled  - Nephrology following. EPO post dialysis  - Jakafi post dialysis. Nephro-deonna. Holding calcium acetate due to low phos.   - Renal diet

## 2022-10-11 ENCOUNTER — TRANSCRIPTION ENCOUNTER (OUTPATIENT)
Age: 62
End: 2022-10-11

## 2022-10-11 LAB
ALBUMIN SERPL ELPH-MCNC: 3.6 G/DL — SIGNIFICANT CHANGE UP (ref 3.3–5)
ALP SERPL-CCNC: 70 U/L — SIGNIFICANT CHANGE UP (ref 40–120)
ALT FLD-CCNC: 15 U/L — SIGNIFICANT CHANGE UP (ref 10–45)
ANION GAP SERPL CALC-SCNC: 12 MMOL/L — SIGNIFICANT CHANGE UP (ref 5–17)
AST SERPL-CCNC: 22 U/L — SIGNIFICANT CHANGE UP (ref 10–40)
BILIRUB SERPL-MCNC: 1.4 MG/DL — HIGH (ref 0.2–1.2)
BUN SERPL-MCNC: 16 MG/DL — SIGNIFICANT CHANGE UP (ref 7–23)
CALCIUM SERPL-MCNC: 8.1 MG/DL — LOW (ref 8.4–10.5)
CHLORIDE SERPL-SCNC: 98 MMOL/L — SIGNIFICANT CHANGE UP (ref 96–108)
CO2 SERPL-SCNC: 26 MMOL/L — SIGNIFICANT CHANGE UP (ref 22–31)
CREAT SERPL-MCNC: 4.62 MG/DL — HIGH (ref 0.5–1.3)
EGFR: 10 ML/MIN/1.73M2 — LOW
GLUCOSE SERPL-MCNC: 83 MG/DL — SIGNIFICANT CHANGE UP (ref 70–99)
HCT VFR BLD CALC: 29.3 % — LOW (ref 34.5–45)
HGB BLD-MCNC: 9.2 G/DL — LOW (ref 11.5–15.5)
MAGNESIUM SERPL-MCNC: 2.3 MG/DL — SIGNIFICANT CHANGE UP (ref 1.6–2.6)
MCHC RBC-ENTMCNC: 30.1 PG — SIGNIFICANT CHANGE UP (ref 27–34)
MCHC RBC-ENTMCNC: 31.4 GM/DL — LOW (ref 32–36)
MCV RBC AUTO: 95.8 FL — SIGNIFICANT CHANGE UP (ref 80–100)
NRBC # BLD: 0 /100 WBCS — SIGNIFICANT CHANGE UP (ref 0–0)
PHOSPHATE SERPL-MCNC: 2.5 MG/DL — SIGNIFICANT CHANGE UP (ref 2.5–4.5)
PLATELET # BLD AUTO: 151 K/UL — SIGNIFICANT CHANGE UP (ref 150–400)
POTASSIUM SERPL-MCNC: 4.4 MMOL/L — SIGNIFICANT CHANGE UP (ref 3.5–5.3)
POTASSIUM SERPL-SCNC: 4.4 MMOL/L — SIGNIFICANT CHANGE UP (ref 3.5–5.3)
PROT SERPL-MCNC: 6.4 G/DL — SIGNIFICANT CHANGE UP (ref 6–8.3)
RBC # BLD: 3.06 M/UL — LOW (ref 3.8–5.2)
RBC # FLD: 18.6 % — HIGH (ref 10.3–14.5)
SARS-COV-2 RNA SPEC QL NAA+PROBE: SIGNIFICANT CHANGE UP
SODIUM SERPL-SCNC: 136 MMOL/L — SIGNIFICANT CHANGE UP (ref 135–145)
WBC # BLD: 8.47 K/UL — SIGNIFICANT CHANGE UP (ref 3.8–10.5)
WBC # FLD AUTO: 8.47 K/UL — SIGNIFICANT CHANGE UP (ref 3.8–10.5)

## 2022-10-11 PROCEDURE — 99232 SBSQ HOSP IP/OBS MODERATE 35: CPT | Mod: GC

## 2022-10-11 PROCEDURE — 71045 X-RAY EXAM CHEST 1 VIEW: CPT | Mod: 26

## 2022-10-11 PROCEDURE — 99233 SBSQ HOSP IP/OBS HIGH 50: CPT | Mod: GC

## 2022-10-11 PROCEDURE — 93306 TTE W/DOPPLER COMPLETE: CPT | Mod: 26

## 2022-10-11 PROCEDURE — 93356 MYOCRD STRAIN IMG SPCKL TRCK: CPT

## 2022-10-11 RX ORDER — HYDROMORPHONE HYDROCHLORIDE 2 MG/ML
1 INJECTION INTRAMUSCULAR; INTRAVENOUS; SUBCUTANEOUS EVERY 4 HOURS
Refills: 0 | Status: DISCONTINUED | OUTPATIENT
Start: 2022-10-11 | End: 2022-10-12

## 2022-10-11 RX ADMIN — Medication 1 TABLET(S): at 12:05

## 2022-10-11 RX ADMIN — CARVEDILOL PHOSPHATE 3.12 MILLIGRAM(S): 80 CAPSULE, EXTENDED RELEASE ORAL at 21:41

## 2022-10-11 RX ADMIN — HYDROMORPHONE HYDROCHLORIDE 0.4 MILLIGRAM(S): 2 INJECTION INTRAMUSCULAR; INTRAVENOUS; SUBCUTANEOUS at 02:45

## 2022-10-11 RX ADMIN — CARVEDILOL PHOSPHATE 3.12 MILLIGRAM(S): 80 CAPSULE, EXTENDED RELEASE ORAL at 05:23

## 2022-10-11 RX ADMIN — Medication 30 MILLILITER(S): at 12:06

## 2022-10-11 RX ADMIN — CHLORHEXIDINE GLUCONATE 1 APPLICATION(S): 213 SOLUTION TOPICAL at 12:05

## 2022-10-11 RX ADMIN — HYDROMORPHONE HYDROCHLORIDE 0.4 MILLIGRAM(S): 2 INJECTION INTRAMUSCULAR; INTRAVENOUS; SUBCUTANEOUS at 02:28

## 2022-10-11 NOTE — PROGRESS NOTE ADULT - ASSESSMENT
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 Dagher    Date/Time: 10-11-22 @ 11:16

## 2022-10-11 NOTE — PROGRESS NOTE ADULT - SUBJECTIVE AND OBJECTIVE BOX
Progress Note: Surgery  Patient: HU, MIHYEON , 62y (1960)Female   MRN: [id number]  Location: [place] [address]  Visit: 09-30-22 Inpatient  Date: 10-11-22 @ 11:16    Events over 24h: No acute event overnight. No new complaint. Pt is hemodynamically stable. Tolerating diet, still complaining of mild abdominal pain in the epigastric region this AM. Patient dialyzed 10/10. No F/chill. No N/V.    Vitals: T(F): 98 (10-11-22 @ 11:13), Max: 98.4 (10-10-22 @ 20:35)  HR: 63 (10-11-22 @ 11:13)  BP: 128/72 (10-11-22 @ 11:13) (128/71 - 149/75)  RR: 18 (10-11-22 @ 11:13)  SpO2: 99% (10-11-22 @ 11:13)    Diet: Diet, Renal Restrictions:   For patients receiving Renal Replacement - No Protein Restr, No Conc K, No Conc Phos, Low Sodium  Low Fat (LOWFAT)  1000mL Fluid Restriction ([id number]) (10-08-22 @ 07:50)    IV Fluid: multivitamin 1 Tablet(s) Oral daily  Nephro-deonna 1 Tablet(s) Oral daily    In:   10-10-22 @ 07:01  -  10-11-22 @ 07:00  --------------------------------------------------------  IN: 660 mL    10-11-22 @ 07:01  -  10-11-22 @ 11:16  --------------------------------------------------------  IN: 240 mL    Out:   10-10-22 @ 07:01  -  10-11-22 @ 07:00  --------------------------------------------------------  OUT:    Other (mL): 1000 mL  Total OUT: 1000 mL      10-11-22 @ 07:01  -  10-11-22 @ 11:16  --------------------------------------------------------  OUT:  Total OUT: 0 mL    Net:   10-10-22 @ 07:01  -  10-11-22 @ 07:00  --------------------------------------------------------  NET: -340 mL    10-11-22 @ 07:01  -  10-11-22 @ 11:16  --------------------------------------------------------  NET: 240 mL    Physical Examination:  General Appearance: NAD, alert and cooperative  Heart: S1 and S2. No murmurs. Rhythm is regular.  Lungs: Clear to auscultation BL without rales, rhonchi, wheezing, crackles or diminished breath sounds.  Abdomen: Positive bowel sounds. Soft, nondistended, mild tenderness epigastric region. No rigidity, guarding, or rebound tenderness.    Medications: [Standing]  carvedilol 3.125 milliGRAM(s) Oral every 12 hours  chlorhexidine 2% Cloths 1 Application(s) Topical daily  epoetin filiberto-epbx (RETACRIT) Injectable 08299 Unit(s) IV Push <User Schedule>  multivitamin 1 Tablet(s) Oral daily  Nephro-deonna 1 Tablet(s) Oral daily  ruxolitinib 10 milliGRAM(s) Oral <User Schedule>    Medications:[PRN]  aluminum hydroxide/magnesium hydroxide/simethicone Suspension 30 milliLiter(s) Oral every 4 hours PRN  HYDROmorphone   Tablet 1 milliGRAM(s) Oral every 4 hours PRN    Labs:                    9.2    8.47  )-----------( 151      ( 11 Oct 2022 06:16 )             29.3   10-11    136  |  98  |  16  ----------------------------<  83  4.4   |  26  |  4.62<H>    Ca    8.1<L>      11 Oct 2022 06:13  Phos  2.5     10-11  Mg     2.3     10-11    TPro  6.4  /  Alb  3.6  /  TBili  1.4<H>  /  DBili  x   /  AST  22  /  ALT  15  /  AlkPhos  70  10-11  LIVER FUNCTIONS - ( 11 Oct 2022 06:13 )  Alb: 3.6 g/dL / Pro: 6.4 g/dL / ALK PHOS: 70 U/L / ALT: 15 U/L / AST: 22 U/L / GGT: x           Micro/Urine:    Imaging:  None/24h

## 2022-10-11 NOTE — PROGRESS NOTE ADULT - PROBLEM SELECTOR PLAN 6
DVT proph: SCD  Dispo: Patient tolerating low fat renal diet. Pending reinstatement of dialysis but unlikely to happen over the weekend so she might have to go on monday after dialysis. Is first on the schedule for monday DVT proph: SCD  Dispo: Patient tolerating low fat renal diet. Pending reinstatement of dialysis post op

## 2022-10-11 NOTE — PROGRESS NOTE ADULT - ASSESSMENT
63 yo F hx of myelofibrosis, polycythemia vera, ESRD on MFW schedule via LUE AVF (last dialysis W), portal HTN, subclavian port for transfusions, gastric varices, gallstones, recent ERCP for choledocolithiasis s/p biliary stent, c/b post ERCP pancreatitis now presenting with fever and RUQ and epigastric pain. NM Hepatobiliary study positive for acute cholecystitis. MRCP + for acute vikas. However, US RUQ not consistent with acute cholecystitis. May likely have pancreatitis given elevated lipase and severe abdominal pain. No inpatient intervention as per surgery.

## 2022-10-11 NOTE — PROGRESS NOTE ADULT - SUBJECTIVE AND OBJECTIVE BOX
PROGRESS NOTE:   Authored by Dr. Juarez Can  Patient is a 62y old  Female who presents with a chief complaint of Abdominal pain (11 Oct 2022 14:04)      SUBJECTIVE / OVERNIGHT EVENTS:  ON, pain at night 10/10, epigastric radiates to the back, last about 30 mins, relieved by IV meds    ADDITIONAL REVIEW OF SYSTEMS:  Review of Systems:  Constitutional: No fever, No weight loss, good appetite/po intake  Head: No headache   Eyes: No blurry vision, No diplopia  Neuro: No tremors, No muscle weakness   Cardiovascular: No chest pain, No palpitations  Respiratory: No SOB, No cough  GI: No nausea, No vomiting, No diarrhea  : No dysuria, No hematuria  Skin: No rash  MSK: No joint pain   Psych: No depression  Heme: No abnormal bruising, no abnormal bleeding    MEDICATIONS  (STANDING):  carvedilol 3.125 milliGRAM(s) Oral every 12 hours  chlorhexidine 2% Cloths 1 Application(s) Topical daily  epoetin filiberto-epbx (RETACRIT) Injectable 03748 Unit(s) IV Push <User Schedule>  multivitamin 1 Tablet(s) Oral daily  Nephro-deonna 1 Tablet(s) Oral daily  ruxolitinib 10 milliGRAM(s) Oral <User Schedule>    MEDICATIONS  (PRN):  aluminum hydroxide/magnesium hydroxide/simethicone Suspension 30 milliLiter(s) Oral every 4 hours PRN Dyspepsia  HYDROmorphone   Tablet 1 milliGRAM(s) Oral every 4 hours PRN Moderate Pain (4 - 6)      CAPILLARY BLOOD GLUCOSE        I&O's Summary    10 Oct 2022 07:01  -  11 Oct 2022 07:00  --------------------------------------------------------  IN: 660 mL / OUT: 1000 mL / NET: -340 mL    11 Oct 2022 07:01  -  11 Oct 2022 15:06  --------------------------------------------------------  IN: 480 mL / OUT: 0 mL / NET: 480 mL        PHYSICAL EXAM:  Vital Signs Last 24 Hrs  T(C): 36.7 (11 Oct 2022 14:43), Max: 36.9 (10 Oct 2022 20:35)  T(F): 98.1 (11 Oct 2022 14:43), Max: 98.4 (10 Oct 2022 20:35)  HR: 69 (11 Oct 2022 14:43) (63 - 74)  BP: 133/75 (11 Oct 2022 14:43) (128/71 - 149/75)  BP(mean): --  RR: 18 (11 Oct 2022 14:43) (18 - 18)  SpO2: 97% (11 Oct 2022 14:43) (97% - 99%)    Parameters below as of 11 Oct 2022 14:43  Patient On (Oxygen Delivery Method): room air        GENERAL: NAD, lying comfortably in bed  HEAD: Atraumatic, normocephalic  EYES: EOMI b/l PERRLA b/l, conjunctiva and sclera clear  NECK: Supple, No JVD, No LAD  RESPIRATORY: Normal respiratory effort; lungs are clear to auscultation bilaterally  CARDIOVASCULAR: Regular rate and rhythm, normal S1 and S2, no murmur/rub/gallop; No lower extremity edema  ABDOMEN: TTP at RUQ and LUQ, normoactive bowel sounds, no rebound/guarding; hepatosplenomegaly appreciated, distended  MUSCULOSKELETAL: no clubbing or cyanosis of digits; no joint swelling or tenderness to palpation  NEURO: Non focal   PSYCH: A+O to person, place, and time; affect appropriate    LABS:                        9.2    8.47  )-----------( 151      ( 11 Oct 2022 06:16 )             29.3     10-11    136  |  98  |  16  ----------------------------<  83  4.4   |  26  |  4.62<H>    Ca    8.1<L>      11 Oct 2022 06:13  Phos  2.5     10-11  Mg     2.3     10-11    TPro  6.4  /  Alb  3.6  /  TBili  1.4<H>  /  DBili  x   /  AST  22  /  ALT  15  /  AlkPhos  70  10-11

## 2022-10-11 NOTE — PROGRESS NOTE ADULT - ATTENDING COMMENTS
63 yo F hx of myelofibrosis, polycythemia vera, ESRD on MWF schedule via LUE AVF (last dialysis W), portal HTN, subclavian port for transfusions, gastric varices, gallstones, recent ERCP for choledocholithiasis s/p biliary stent, c/b post ERCP pancreatitis + pneumobilia now presenting with fever and RUQ and epigastric pain 2/2 cholecystitis. Pain improved, completed antibiotics, plan for OR tomorrow for vikas.     #acute vikas  #pancreatitis  #ESRD  #gastric varices/portal HTN  #myelofibrosis  #PCV  #hx of ERCP pancreatitis    - Patient evaluated at bedside, used  [employer] ID [id number] [employer]; pt had abdominal pain overnight, improved with IV dilaudid, had mild recurrence with eating today however subsided.   - Acute vikas noted, s/p course of abx. Plan for OR tomorrow for cholecystectomy  - Will need HD, echo, XR prior to procedure, nephro aware. NPO after MN, covid swab  - will need outpt ERCP in 2 months to remove stent  - pain control with PO dilaudid    d/w HS 6    Sylvia Escobedo MD  Division of Hospital Medicine  Pager: [phone] or reachable on MS Teams  After hours please page [phone]

## 2022-10-11 NOTE — PROGRESS NOTE ADULT - PROBLEM SELECTOR PLAN 3
- Bi monthly transfusions at [facility]. Followed by Dr. Benjy Guo  - US doppler abdomen no portal or splenic vein thrombosis.  - Liver biopsy with nodular regenerative hyperplasia  - Continue Danazol  - Heme following  - s/p 2unit PRBC early in admission, Hgb now stable and at baseline

## 2022-10-11 NOTE — PROGRESS NOTE ADULT - PROBLEM SELECTOR PLAN 2
- MWF via AVF left arm as scheduled  - Nephrology following. EPO post dialysis  - Jakafi post dialysis. Nephro-deonna. Holding calcium acetate due to low phos.   - Renal diet - MWF via AVF left arm as scheduled, HD 10/11pm ordered  - Nephrology following. EPO post dialysis  - Jakafi post dialysis. Nephro-deonna. Holding calcium acetate due to low phos.   - Renal diet

## 2022-10-11 NOTE — PROGRESS NOTE ADULT - PROBLEM SELECTOR PLAN 1
- Last admission pt with cholelithiasis and choledocholithiasis. Had ERCP with stone removal c/b post procedure pancreatitis that delayed surgery. Cholelithiasis and a focus of antidependent air seen on CT. Gallbladder wall thickening/edema on CT from 9/26. May be acute cholecystitis and pancreatitis. Abdominal pain improved today from fluids and bowel rest; currently on abx  - HIDA scan positive for cholecystitis  - MRCP with mildly distended gallbladder with nonspecific diffuse wall thickening and   air from instrumentation, possible vikas  -US RUQ indicates no acute cholecystitis 10/5  - Lipase also elevated at 257. Repeat today ~160's  - Patient without elevated WBC but has myelofibrosis. afebrile. BCx NGTD.   - Empiric zosyn 9/30-->10/9 last dose in am  - Low fat diet.  - Talked with transplant dr. [facility], was told that they don't accept the emergency medicaid. and relayed the message to the transplant team, they now will reevaluate her rose 10/11. - Last admission pt with cholelithiasis and choledocholithiasis. Had ERCP with stone removal c/b post procedure pancreatitis that delayed surgery. Cholelithiasis and a focus of antidependent air seen on CT. Gallbladder wall thickening/edema on CT from 9/26. May be acute cholecystitis and pancreatitis. Abdominal pain improved today from fluids and bowel rest; currently on abx  - HIDA scan positive for cholecystitis  - MRCP with mildly distended gallbladder with nonspecific diffuse wall thickening and   air from instrumentation, possible vikas  - Patient without elevated WBC but has myelofibrosis. afebrile. BCx NGTD.   - Empiric zosyn 9/30-->10/9 last dose in am  - Low fat diet.  - Transplant surgery team reevaluated her 10/11 am, will proceed with 10/12 PM cholecystectomy, COVID swab, pre op am labs, echo, anesthesia, urgent HD 10/11 pm is ordered in preparation for the vikas. - Last admission pt with cholelithiasis and choledocholithiasis. Had ERCP with stone removal c/b post procedure pancreatitis that delayed surgery. Cholelithiasis and a focus of antidependent air seen on CT. Gallbladder wall thickening/edema on CT from 9/26. May be acute cholecystitis and pancreatitis. Abdominal pain improved today from fluids and bowel rest; s/p course of abx  - HIDA scan positive for cholecystitis  - MRCP with mildly distended gallbladder with nonspecific diffuse wall thickening and   air from instrumentation, possible vikas  - Patient without elevated WBC but has myelofibrosis. afebrile. BCx NGTD.   - Empiric zosyn 9/30-->10/9 last dose in am  - Low fat diet.  - Transplant surgery team reevaluated her 10/11 am, will proceed with 10/12 PM cholecystectomy, COVID swab, pre op am labs, echo, anesthesia, urgent HD 10/11 pm is ordered in preparation for the vikas.

## 2022-10-12 ENCOUNTER — RESULT REVIEW (OUTPATIENT)
Age: 62
End: 2022-10-12

## 2022-10-12 ENCOUNTER — TRANSCRIPTION ENCOUNTER (OUTPATIENT)
Age: 62
End: 2022-10-12

## 2022-10-12 LAB
ALBUMIN SERPL ELPH-MCNC: 3.8 G/DL — SIGNIFICANT CHANGE UP (ref 3.3–5)
ALBUMIN SERPL ELPH-MCNC: 4.1 G/DL — SIGNIFICANT CHANGE UP (ref 3.3–5)
ALP SERPL-CCNC: 79 U/L — SIGNIFICANT CHANGE UP (ref 40–120)
ALP SERPL-CCNC: 82 U/L — SIGNIFICANT CHANGE UP (ref 40–120)
ALT FLD-CCNC: 17 U/L — SIGNIFICANT CHANGE UP (ref 10–45)
ALT FLD-CCNC: 20 U/L — SIGNIFICANT CHANGE UP (ref 10–45)
ANION GAP SERPL CALC-SCNC: 11 MMOL/L — SIGNIFICANT CHANGE UP (ref 5–17)
ANION GAP SERPL CALC-SCNC: 15 MMOL/L — SIGNIFICANT CHANGE UP (ref 5–17)
APTT BLD: 33 SEC — SIGNIFICANT CHANGE UP (ref 27.5–35.5)
APTT BLD: 34.9 SEC — SIGNIFICANT CHANGE UP (ref 27.5–35.5)
AST SERPL-CCNC: 26 U/L — SIGNIFICANT CHANGE UP (ref 10–40)
AST SERPL-CCNC: 28 U/L — SIGNIFICANT CHANGE UP (ref 10–40)
BASOPHILS # BLD AUTO: 0.16 K/UL — SIGNIFICANT CHANGE UP (ref 0–0.2)
BASOPHILS NFR BLD AUTO: 1.8 % — SIGNIFICANT CHANGE UP (ref 0–2)
BILIRUB DIRECT SERPL-MCNC: 0.4 MG/DL — HIGH (ref 0–0.3)
BILIRUB DIRECT SERPL-MCNC: 0.6 MG/DL — HIGH (ref 0–0.3)
BILIRUB INDIRECT FLD-MCNC: 0.8 MG/DL — SIGNIFICANT CHANGE UP (ref 0.2–1)
BILIRUB INDIRECT FLD-MCNC: 0.8 MG/DL — SIGNIFICANT CHANGE UP (ref 0.2–1)
BILIRUB SERPL-MCNC: 1.2 MG/DL — SIGNIFICANT CHANGE UP (ref 0.2–1.2)
BILIRUB SERPL-MCNC: 1.4 MG/DL — HIGH (ref 0.2–1.2)
BLD GP AB SCN SERPL QL: NEGATIVE — SIGNIFICANT CHANGE UP
BUN SERPL-MCNC: 14 MG/DL — SIGNIFICANT CHANGE UP (ref 7–23)
BUN SERPL-MCNC: 17 MG/DL — SIGNIFICANT CHANGE UP (ref 7–23)
CALCIUM SERPL-MCNC: 8.1 MG/DL — LOW (ref 8.4–10.5)
CALCIUM SERPL-MCNC: 8.4 MG/DL — SIGNIFICANT CHANGE UP (ref 8.4–10.5)
CHLORIDE SERPL-SCNC: 98 MMOL/L — SIGNIFICANT CHANGE UP (ref 96–108)
CHLORIDE SERPL-SCNC: 99 MMOL/L — SIGNIFICANT CHANGE UP (ref 96–108)
CO2 SERPL-SCNC: 23 MMOL/L — SIGNIFICANT CHANGE UP (ref 22–31)
CO2 SERPL-SCNC: 29 MMOL/L — SIGNIFICANT CHANGE UP (ref 22–31)
CREAT SERPL-MCNC: 3.69 MG/DL — HIGH (ref 0.5–1.3)
CREAT SERPL-MCNC: 4.37 MG/DL — HIGH (ref 0.5–1.3)
EGFR: 11 ML/MIN/1.73M2 — LOW
EGFR: 13 ML/MIN/1.73M2 — LOW
EOSINOPHIL # BLD AUTO: 0.11 K/UL — SIGNIFICANT CHANGE UP (ref 0–0.5)
EOSINOPHIL NFR BLD AUTO: 1.2 % — SIGNIFICANT CHANGE UP (ref 0–6)
GAS PNL BLDA: SIGNIFICANT CHANGE UP
GLUCOSE BLDC GLUCOMTR-MCNC: 81 MG/DL — SIGNIFICANT CHANGE UP (ref 70–99)
GLUCOSE SERPL-MCNC: 147 MG/DL — HIGH (ref 70–99)
GLUCOSE SERPL-MCNC: 59 MG/DL — LOW (ref 70–99)
HCT VFR BLD CALC: 30.9 % — LOW (ref 34.5–45)
HCT VFR BLD CALC: 31.3 % — LOW (ref 34.5–45)
HGB BLD-MCNC: 9.7 G/DL — LOW (ref 11.5–15.5)
HGB BLD-MCNC: 9.8 G/DL — LOW (ref 11.5–15.5)
IMM GRANULOCYTES NFR BLD AUTO: 5.1 % — HIGH (ref 0–0.9)
INR BLD: 1.16 RATIO — SIGNIFICANT CHANGE UP (ref 0.88–1.16)
INR BLD: 1.16 RATIO — SIGNIFICANT CHANGE UP (ref 0.88–1.16)
LYMPHOCYTES # BLD AUTO: 0.57 K/UL — LOW (ref 1–3.3)
LYMPHOCYTES # BLD AUTO: 6.4 % — LOW (ref 13–44)
MAGNESIUM SERPL-MCNC: 2.3 MG/DL — SIGNIFICANT CHANGE UP (ref 1.6–2.6)
MAGNESIUM SERPL-MCNC: 2.4 MG/DL — SIGNIFICANT CHANGE UP (ref 1.6–2.6)
MCHC RBC-ENTMCNC: 29.8 PG — SIGNIFICANT CHANGE UP (ref 27–34)
MCHC RBC-ENTMCNC: 30.4 PG — SIGNIFICANT CHANGE UP (ref 27–34)
MCHC RBC-ENTMCNC: 31.3 GM/DL — LOW (ref 32–36)
MCHC RBC-ENTMCNC: 31.4 GM/DL — LOW (ref 32–36)
MCV RBC AUTO: 95.1 FL — SIGNIFICANT CHANGE UP (ref 80–100)
MCV RBC AUTO: 97.2 FL — SIGNIFICANT CHANGE UP (ref 80–100)
MONOCYTES # BLD AUTO: 0.2 K/UL — SIGNIFICANT CHANGE UP (ref 0–0.9)
MONOCYTES NFR BLD AUTO: 2.2 % — SIGNIFICANT CHANGE UP (ref 2–14)
NEUTROPHILS # BLD AUTO: 7.41 K/UL — HIGH (ref 1.8–7.4)
NEUTROPHILS NFR BLD AUTO: 83.3 % — HIGH (ref 43–77)
NRBC # BLD: 0 /100 WBCS — SIGNIFICANT CHANGE UP (ref 0–0)
NRBC # BLD: 1 /100 WBCS — HIGH (ref 0–0)
PHOSPHATE SERPL-MCNC: 2.5 MG/DL — SIGNIFICANT CHANGE UP (ref 2.5–4.5)
PHOSPHATE SERPL-MCNC: 4 MG/DL — SIGNIFICANT CHANGE UP (ref 2.5–4.5)
PLATELET # BLD AUTO: 148 K/UL — LOW (ref 150–400)
PLATELET # BLD AUTO: 167 K/UL — SIGNIFICANT CHANGE UP (ref 150–400)
POTASSIUM SERPL-MCNC: 3.8 MMOL/L — SIGNIFICANT CHANGE UP (ref 3.5–5.3)
POTASSIUM SERPL-MCNC: 5 MMOL/L — SIGNIFICANT CHANGE UP (ref 3.5–5.3)
POTASSIUM SERPL-SCNC: 3.8 MMOL/L — SIGNIFICANT CHANGE UP (ref 3.5–5.3)
POTASSIUM SERPL-SCNC: 5 MMOL/L — SIGNIFICANT CHANGE UP (ref 3.5–5.3)
PROT SERPL-MCNC: 6.9 G/DL — SIGNIFICANT CHANGE UP (ref 6–8.3)
PROT SERPL-MCNC: 7.3 G/DL — SIGNIFICANT CHANGE UP (ref 6–8.3)
PROTHROM AB SERPL-ACNC: 13.4 SEC — SIGNIFICANT CHANGE UP (ref 10.5–13.4)
PROTHROM AB SERPL-ACNC: 13.5 SEC — HIGH (ref 10.5–13.4)
RBC # BLD: 3.22 M/UL — LOW (ref 3.8–5.2)
RBC # BLD: 3.25 M/UL — LOW (ref 3.8–5.2)
RBC # FLD: 18.9 % — HIGH (ref 10.3–14.5)
RBC # FLD: 19.1 % — HIGH (ref 10.3–14.5)
RH IG SCN BLD-IMP: POSITIVE — SIGNIFICANT CHANGE UP
SODIUM SERPL-SCNC: 136 MMOL/L — SIGNIFICANT CHANGE UP (ref 135–145)
SODIUM SERPL-SCNC: 139 MMOL/L — SIGNIFICANT CHANGE UP (ref 135–145)
WBC # BLD: 8.49 K/UL — SIGNIFICANT CHANGE UP (ref 3.8–10.5)
WBC # BLD: 8.9 K/UL — SIGNIFICANT CHANGE UP (ref 3.8–10.5)
WBC # FLD AUTO: 8.49 K/UL — SIGNIFICANT CHANGE UP (ref 3.8–10.5)
WBC # FLD AUTO: 8.9 K/UL — SIGNIFICANT CHANGE UP (ref 3.8–10.5)

## 2022-10-12 PROCEDURE — 99232 SBSQ HOSP IP/OBS MODERATE 35: CPT | Mod: GC

## 2022-10-12 PROCEDURE — 88304 TISSUE EXAM BY PATHOLOGIST: CPT | Mod: 26

## 2022-10-12 PROCEDURE — 47600 CHOLECYSTECTOMY: CPT | Mod: GC

## 2022-10-12 DEVICE — KIT CATH RADIAL ARTERY: Type: IMPLANTABLE DEVICE | Status: FUNCTIONAL

## 2022-10-12 RX ORDER — ERYTHROPOIETIN 10000 [IU]/ML
20000 INJECTION, SOLUTION INTRAVENOUS; SUBCUTANEOUS
Refills: 0 | Status: DISCONTINUED | OUTPATIENT
Start: 2022-10-12 | End: 2022-10-18

## 2022-10-12 RX ORDER — CARVEDILOL PHOSPHATE 80 MG/1
3.12 CAPSULE, EXTENDED RELEASE ORAL EVERY 12 HOURS
Refills: 0 | Status: DISCONTINUED | OUTPATIENT
Start: 2022-10-12 | End: 2022-10-18

## 2022-10-12 RX ORDER — CHLORHEXIDINE GLUCONATE 213 G/1000ML
1 SOLUTION TOPICAL DAILY
Refills: 0 | Status: DISCONTINUED | OUTPATIENT
Start: 2022-10-12 | End: 2022-10-18

## 2022-10-12 RX ORDER — ONDANSETRON 8 MG/1
4 TABLET, FILM COATED ORAL ONCE
Refills: 0 | Status: COMPLETED | OUTPATIENT
Start: 2022-10-12 | End: 2022-10-12

## 2022-10-12 RX ORDER — MORPHINE SULFATE 50 MG/1
4 CAPSULE, EXTENDED RELEASE ORAL ONCE
Refills: 0 | Status: DISCONTINUED | OUTPATIENT
Start: 2022-10-12 | End: 2022-10-12

## 2022-10-12 RX ORDER — DANAZOL 200 MG/1
200 CAPSULE ORAL THREE TIMES A DAY
Refills: 0 | Status: DISCONTINUED | OUTPATIENT
Start: 2022-10-13 | End: 2022-10-18

## 2022-10-12 RX ORDER — PIPERACILLIN AND TAZOBACTAM 4; .5 G/20ML; G/20ML
3.38 INJECTION, POWDER, LYOPHILIZED, FOR SOLUTION INTRAVENOUS EVERY 12 HOURS
Refills: 0 | Status: DISCONTINUED | OUTPATIENT
Start: 2022-10-13 | End: 2022-10-16

## 2022-10-12 RX ORDER — CARVEDILOL PHOSPHATE 80 MG/1
3.12 CAPSULE, EXTENDED RELEASE ORAL ONCE
Refills: 0 | Status: COMPLETED | OUTPATIENT
Start: 2022-10-12 | End: 2022-10-12

## 2022-10-12 RX ORDER — HYDROMORPHONE HYDROCHLORIDE 2 MG/ML
0.5 INJECTION INTRAMUSCULAR; INTRAVENOUS; SUBCUTANEOUS EVERY 4 HOURS
Refills: 0 | Status: DISCONTINUED | OUTPATIENT
Start: 2022-10-12 | End: 2022-10-14

## 2022-10-12 RX ORDER — PIPERACILLIN AND TAZOBACTAM 4; .5 G/20ML; G/20ML
3.38 INJECTION, POWDER, LYOPHILIZED, FOR SOLUTION INTRAVENOUS ONCE
Refills: 0 | Status: COMPLETED | OUTPATIENT
Start: 2022-10-13 | End: 2022-10-12

## 2022-10-12 RX ORDER — HYDROMORPHONE HYDROCHLORIDE 2 MG/ML
0.5 INJECTION INTRAMUSCULAR; INTRAVENOUS; SUBCUTANEOUS
Refills: 0 | Status: DISCONTINUED | OUTPATIENT
Start: 2022-10-12 | End: 2022-10-12

## 2022-10-12 RX ORDER — OXYCODONE HYDROCHLORIDE 5 MG/1
5 TABLET ORAL EVERY 6 HOURS
Refills: 0 | Status: DISCONTINUED | OUTPATIENT
Start: 2022-10-12 | End: 2022-10-18

## 2022-10-12 RX ORDER — ACETAMINOPHEN 500 MG
650 TABLET ORAL EVERY 6 HOURS
Refills: 0 | Status: DISCONTINUED | OUTPATIENT
Start: 2022-10-12 | End: 2022-10-18

## 2022-10-12 RX ORDER — HYDROMORPHONE HYDROCHLORIDE 2 MG/ML
0.25 INJECTION INTRAMUSCULAR; INTRAVENOUS; SUBCUTANEOUS
Refills: 0 | Status: DISCONTINUED | OUTPATIENT
Start: 2022-10-12 | End: 2022-10-13

## 2022-10-12 RX ORDER — HYDROMORPHONE HYDROCHLORIDE 2 MG/ML
0.5 INJECTION INTRAMUSCULAR; INTRAVENOUS; SUBCUTANEOUS
Refills: 0 | Status: DISCONTINUED | OUTPATIENT
Start: 2022-10-12 | End: 2022-10-13

## 2022-10-12 RX ORDER — HYDROMORPHONE HYDROCHLORIDE 2 MG/ML
0.25 INJECTION INTRAMUSCULAR; INTRAVENOUS; SUBCUTANEOUS
Refills: 0 | Status: DISCONTINUED | OUTPATIENT
Start: 2022-10-12 | End: 2022-10-12

## 2022-10-12 RX ORDER — PIPERACILLIN AND TAZOBACTAM 4; .5 G/20ML; G/20ML
3.38 INJECTION, POWDER, LYOPHILIZED, FOR SOLUTION INTRAVENOUS ONCE
Refills: 0 | Status: COMPLETED | OUTPATIENT
Start: 2022-10-12 | End: 2022-10-12

## 2022-10-12 RX ORDER — HEPARIN SODIUM 5000 [USP'U]/ML
5000 INJECTION INTRAVENOUS; SUBCUTANEOUS EVERY 8 HOURS
Refills: 0 | Status: DISCONTINUED | OUTPATIENT
Start: 2022-10-12 | End: 2022-10-17

## 2022-10-12 RX ADMIN — PIPERACILLIN AND TAZOBACTAM 200 GRAM(S): 4; .5 INJECTION, POWDER, LYOPHILIZED, FOR SOLUTION INTRAVENOUS at 18:40

## 2022-10-12 RX ADMIN — HYDROMORPHONE HYDROCHLORIDE 0.5 MILLIGRAM(S): 2 INJECTION INTRAMUSCULAR; INTRAVENOUS; SUBCUTANEOUS at 18:00

## 2022-10-12 RX ADMIN — HYDROMORPHONE HYDROCHLORIDE 1 MILLIGRAM(S): 2 INJECTION INTRAMUSCULAR; INTRAVENOUS; SUBCUTANEOUS at 05:15

## 2022-10-12 RX ADMIN — CHLORHEXIDINE GLUCONATE 1 APPLICATION(S): 213 SOLUTION TOPICAL at 11:40

## 2022-10-12 RX ADMIN — CARVEDILOL PHOSPHATE 3.12 MILLIGRAM(S): 80 CAPSULE, EXTENDED RELEASE ORAL at 21:46

## 2022-10-12 RX ADMIN — Medication 650 MILLIGRAM(S): at 22:11

## 2022-10-12 RX ADMIN — HYDROMORPHONE HYDROCHLORIDE 0.5 MILLIGRAM(S): 2 INJECTION INTRAMUSCULAR; INTRAVENOUS; SUBCUTANEOUS at 20:55

## 2022-10-12 RX ADMIN — HYDROMORPHONE HYDROCHLORIDE 0.5 MILLIGRAM(S): 2 INJECTION INTRAMUSCULAR; INTRAVENOUS; SUBCUTANEOUS at 20:56

## 2022-10-12 RX ADMIN — HYDROMORPHONE HYDROCHLORIDE 0.5 MILLIGRAM(S): 2 INJECTION INTRAMUSCULAR; INTRAVENOUS; SUBCUTANEOUS at 20:33

## 2022-10-12 RX ADMIN — HYDROMORPHONE HYDROCHLORIDE 0.5 MILLIGRAM(S): 2 INJECTION INTRAMUSCULAR; INTRAVENOUS; SUBCUTANEOUS at 21:30

## 2022-10-12 RX ADMIN — HYDROMORPHONE HYDROCHLORIDE 0.25 MILLIGRAM(S): 2 INJECTION INTRAMUSCULAR; INTRAVENOUS; SUBCUTANEOUS at 16:46

## 2022-10-12 RX ADMIN — MORPHINE SULFATE 4 MILLIGRAM(S): 50 CAPSULE, EXTENDED RELEASE ORAL at 19:30

## 2022-10-12 RX ADMIN — CARVEDILOL PHOSPHATE 3.12 MILLIGRAM(S): 80 CAPSULE, EXTENDED RELEASE ORAL at 23:59

## 2022-10-12 RX ADMIN — HEPARIN SODIUM 5000 UNIT(S): 5000 INJECTION INTRAVENOUS; SUBCUTANEOUS at 22:11

## 2022-10-12 RX ADMIN — HYDROMORPHONE HYDROCHLORIDE 0.25 MILLIGRAM(S): 2 INJECTION INTRAMUSCULAR; INTRAVENOUS; SUBCUTANEOUS at 17:30

## 2022-10-12 RX ADMIN — ONDANSETRON 4 MILLIGRAM(S): 8 TABLET, FILM COATED ORAL at 16:21

## 2022-10-12 RX ADMIN — PIPERACILLIN AND TAZOBACTAM 200 GRAM(S): 4; .5 INJECTION, POWDER, LYOPHILIZED, FOR SOLUTION INTRAVENOUS at 23:44

## 2022-10-12 RX ADMIN — Medication 650 MILLIGRAM(S): at 23:00

## 2022-10-12 RX ADMIN — MORPHINE SULFATE 4 MILLIGRAM(S): 50 CAPSULE, EXTENDED RELEASE ORAL at 19:07

## 2022-10-12 RX ADMIN — HYDROMORPHONE HYDROCHLORIDE 0.5 MILLIGRAM(S): 2 INJECTION INTRAMUSCULAR; INTRAVENOUS; SUBCUTANEOUS at 17:43

## 2022-10-12 RX ADMIN — HYDROMORPHONE HYDROCHLORIDE 0.5 MILLIGRAM(S): 2 INJECTION INTRAMUSCULAR; INTRAVENOUS; SUBCUTANEOUS at 18:39

## 2022-10-12 RX ADMIN — HYDROMORPHONE HYDROCHLORIDE 1 MILLIGRAM(S): 2 INJECTION INTRAMUSCULAR; INTRAVENOUS; SUBCUTANEOUS at 04:43

## 2022-10-12 RX ADMIN — HYDROMORPHONE HYDROCHLORIDE 0.5 MILLIGRAM(S): 2 INJECTION INTRAMUSCULAR; INTRAVENOUS; SUBCUTANEOUS at 19:00

## 2022-10-12 NOTE — PROGRESS NOTE ADULT - SUBJECTIVE AND OBJECTIVE BOX
PROGRESS NOTE:   Authored by Dr. Juarez Can  Patient is a 62y old  Female who presents with a chief complaint of Abdominal pain (12 Oct 2022 13:34)      SUBJECTIVE / OVERNIGHT EVENTS:  pt seen at bedside, since HD yesterday, she doesn't feel bloated, pain well controlled by PO meds last night. eager to get operated.     ADDITIONAL REVIEW OF SYSTEMS:  Review of Systems:  Constitutional: No fever, No weight loss, good appetite/po intake  Head: No headache   Eyes: No blurry vision, No diplopia  Neuro: No tremors, No muscle weakness   Cardiovascular: No chest pain, No palpitations  Respiratory: No SOB, No cough  GI: No nausea, No vomiting, No diarrhea  : No dysuria, No hematuria  Skin: No rash  MSK: No joint pain   Psych: No depression  Heme: No abnormal bruising, no abnormal bleeding      MEDICATIONS  (STANDING):  acetaminophen     Tablet .. 650 milliGRAM(s) Oral every 6 hours  carvedilol 3.125 milliGRAM(s) Oral every 12 hours  chlorhexidine 2% Cloths 1 Application(s) Topical daily  epoetin filiberto-epbx (RETACRIT) Injectable 43191 Unit(s) IV Push <User Schedule>  heparin   Injectable 5000 Unit(s) SubCutaneous every 8 hours  multivitamin 1 Tablet(s) Oral daily  Nephro-deonna 1 Tablet(s) Oral daily  piperacillin/tazobactam IVPB. 3.375 Gram(s) IV Intermittent once    MEDICATIONS  (PRN):  HYDROmorphone  Injectable 0.5 milliGRAM(s) IV Push every 4 hours PRN Severe Pain (7 - 10)  HYDROmorphone  Injectable 0.25 milliGRAM(s) IV Push every 10 minutes PRN Moderate Pain (4 - 6)  HYDROmorphone  Injectable 0.5 milliGRAM(s) IV Push every 10 minutes PRN Severe Pain (7 - 10)  oxyCODONE    IR 5 milliGRAM(s) Oral every 6 hours PRN Moderate Pain (4 - 6)      CAPILLARY BLOOD GLUCOSE      POCT Blood Glucose.: 81 mg/dL (12 Oct 2022 08:16)    I&O's Summary    11 Oct 2022 07:01  -  12 Oct 2022 07:00  --------------------------------------------------------  IN: 1740 mL / OUT: 1800 mL / NET: -60 mL    12 Oct 2022 07:01  -  12 Oct 2022 17:50  --------------------------------------------------------  IN: 0 mL / OUT: 0 mL / NET: 0 mL        PHYSICAL EXAM:  Vital Signs Last 24 Hrs  T(C): 36 (12 Oct 2022 15:57), Max: 37 (12 Oct 2022 04:40)  T(F): 96.8 (12 Oct 2022 15:57), Max: 98.6 (12 Oct 2022 04:40)  HR: 67 (12 Oct 2022 17:30) (65 - 73)  BP: 170/72 (12 Oct 2022 16:45) (124/69 - 170/72)  BP(mean): 104 (12 Oct 2022 16:45) (99 - 108)  RR: 14 (12 Oct 2022 17:30) (14 - 18)  SpO2: 99% (12 Oct 2022 17:30) (96% - 100%)    Parameters below as of 12 Oct 2022 17:00  Patient On (Oxygen Delivery Method): nasal cannula  O2 Flow (L/min): 2      GENERAL: NAD, lying comfortably in bed  HEAD: Atraumatic, normocephalic  EYES: EOMI b/l PERRLA b/l, conjunctiva and sclera clear  NECK: Supple, No JVD, No LAD  RESPIRATORY: Normal respiratory effort; lungs are clear to auscultation bilaterally  CARDIOVASCULAR: Regular rate and rhythm, normal S1 and S2, no murmur/rub/gallop; No lower extremity edema  ABDOMEN: Nontender, normoactive bowel sounds, no rebound/guarding;  hepatosplenomegaly  MUSCULOSKELETAL: no clubbing or cyanosis of digits; no joint swelling or tenderness to palpation  NEURO: Non focal   PSYCH: A+O to person, place, and time; affect appropriate    LABS:                        9.8    8.90  )-----------( 148      ( 12 Oct 2022 16:56 )             31.3     10-12    136  |  98  |  17  ----------------------------<  147<H>  5.0   |  23  |  4.37<H>    Ca    8.1<L>      12 Oct 2022 16:56  Phos  4.0     10-12  Mg     2.4     10-12    TPro  6.9  /  Alb  3.8  /  TBili  1.4<H>  /  DBili  0.6<H>  /  AST  28  /  ALT  20  /  AlkPhos  79  10-12    PT/INR - ( 12 Oct 2022 16:56 )   PT: 13.4 sec;   INR: 1.16 ratio         PTT - ( 12 Oct 2022 16:56 )  PTT:33.0 sec

## 2022-10-12 NOTE — PROGRESS NOTE ADULT - ASSESSMENT
63 yo F hx of myelofibrosis, polycythemia vera, ESRD on MFW schedule via LUE AVF (last dialysis W), portal HTN, subclavian port for transfusions, gastric varices, gallstones, recent ERCP for choledocolithiasis s/p biliary stent, c/b post ERCP pancreatitis + pneumobilia now presenting with fever and RUQ and epigastric pain found to have cholecystitis. Hematology consulted for PV and myelofibrosis     Hematology history  History of MAK 2 + polycythemia vera, initially diagnosed in 2012. 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 Hollie Guzmán in [place] (# [phone]). She has had a bone marrow biopsy done at the time of diagnosis. Was previously non complaint with therapy there, however has been quite complaint with the hydroxyurea since coming to [facility] fellows clinic here in 2017. Patient now follows with Dr. Jacky Guo after closure of the fellows clinic.   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  She was admitted to [facility] from July 3-5, 2018 due to hyperkalemia and thrombosis of her AVF. Hematology was consulted and recommended phlebotomy, but patient refused. She was discharged on Hydrea 500mg on HD days, which she is still on. As of 9/27/19 she has transferred from the hematology fellows clinic to Dr. Guo's service at [facility]. She was previously treated with HU in the setting of abdominal pain 2/2 splenomegaly w/ splenic infarcts; Hgb came up with treatment and pain improved. After a month of Jakafi 15 mg following HD three times a week, patient abdominal pain and LUQ pain resolved, but she became weak and anemic, requiring transfusion at the hospital. She is currently on Jakafi 10 mg on days she gets HD three times a week, and was started on danazol 600mg daily    #PV and myelofibrosis  - hx MAK 2+ PV and secondary myelofibrosis on jakafi 10mg TIW w/ dialysis and danazol 200mg TID, receives bimonthly transfusions at [facility] s/p 2 units of blood this hospital admission for Hg 7 (baseline 8-9)  - follows w/ Dr. Jacky Guo  - continue with Jakafi and danazol    #acute cholecystitis  - noted on HIDA, MRCP w/ hepatic microabscess  - planned for cholecystectomy 10/12

## 2022-10-12 NOTE — PROGRESS NOTE ADULT - SUBJECTIVE AND OBJECTIVE BOX
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     Now s/p open cholecystectomy (POD#0)    Interval Events:  -Afebrile, VSS on Zosyn  -pain controlled  -no complaints at this time    Potential Discharge date: pending clinical stability    Education:  Medications    Plan of care:  See Below    MEDICATIONS  (STANDING):  acetaminophen     Tablet .. 650 milliGRAM(s) Oral every 6 hours  carvedilol 3.125 milliGRAM(s) Oral every 12 hours  chlorhexidine 2% Cloths 1 Application(s) Topical daily  epoetin filiberto-epbx (RETACRIT) Injectable 25120 Unit(s) IV Push <User Schedule>  heparin   Injectable 5000 Unit(s) SubCutaneous every 8 hours  multivitamin 1 Tablet(s) Oral daily  Nephro-deonna 1 Tablet(s) Oral daily    MEDICATIONS  (PRN):  HYDROmorphone  Injectable 0.5 milliGRAM(s) IV Push every 4 hours PRN Severe Pain (7 - 10)  HYDROmorphone  Injectable 0.25 milliGRAM(s) IV Push every 10 minutes PRN Moderate Pain (4 - 6)  HYDROmorphone  Injectable 0.5 milliGRAM(s) IV Push every 10 minutes PRN Severe Pain (7 - 10)  oxyCODONE    IR 5 milliGRAM(s) Oral every 6 hours PRN Moderate Pain (4 - 6)      PAST MEDICAL & SURGICAL HISTORY:  Polycythemia vera  and secondary myelofibrosis      Peptic ulcer disease      Hypertension      Splenomegaly  2015      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      Pancreatic cyst      History of myelofibrosis      History of myelofibrosis      Peritoneal dialysis catheter in place  Placed 8/9/2017      Hemoperitoneum as complication of peritoneal dialysis  s/p removal 9/18      AV fistula  Placed 9/18          Vital Signs Last 24 Hrs  T(C): 36.7 (12 Oct 2022 21:00), Max: 37 (12 Oct 2022 04:40)  T(F): 98.1 (12 Oct 2022 21:00), Max: 98.6 (12 Oct 2022 04:40)  HR: 85 (12 Oct 2022 21:45) (65 - 85)  BP: 160/74 (12 Oct 2022 21:45) (124/69 - 178/78)  BP(mean): 107 (12 Oct 2022 21:45) (99 - 113)  RR: 16 (12 Oct 2022 21:45) (14 - 18)  SpO2: 100% (12 Oct 2022 21:45) (94% - 100%)    Parameters below as of 12 Oct 2022 21:45  Patient On (Oxygen Delivery Method): room air        I&O's Summary    11 Oct 2022 07:01  -  12 Oct 2022 07:00  --------------------------------------------------------  IN: 1740 mL / OUT: 1800 mL / NET: -60 mL    12 Oct 2022 07:01  -  12 Oct 2022 22:57  --------------------------------------------------------  IN: 280 mL / OUT: 0 mL / NET: 280 mL                              9.8    8.90  )-----------( 148      ( 12 Oct 2022 16:56 )             31.3     10-12    136  |  98  |  17  ----------------------------<  147<H>  5.0   |  23  |  4.37<H>    Ca    8.1<L>      12 Oct 2022 16:56  Phos  4.0     10-12  Mg     2.4     10-12    TPro  6.9  /  Alb  3.8  /  TBili  1.4<H>  /  DBili  0.6<H>  /  AST  28  /  ALT  20  /  AlkPhos  79  10-12            Review of systems  Gen: No weight changes, fatigue, fevers/chills, weakness  Skin: No rashes  Head/Eyes/Ears/Mouth: No headache; Normal hearing; Normal vision w/o blurriness; No sinus pain/discomfort, sore throat  Respiratory: No dyspnea, cough, wheezing, hemoptysis  CV: No chest pain, PND, orthopnea  GI: Mild abdominal pain at surgical incision site; denies diarrhea, constipation, nausea, vomiting, melena, hematochezia  : No increased frequency, dysuria, hematuria, nocturia  MSK: No joint pain/swelling; no back pain; no edema  Neuro: No dizziness/lightheadedness, weakness, seizures, numbness, tingling  Heme: No easy bruising or bleeding  Endo: No heat/cold intolerance  Psych: No significant nervousness, anxiety, stress, depression  All other systems were reviewed and are negative, except as noted.      PHYSICAL EXAM:  Constitutional: Well developed / well nourished  Eyes: Anicteric, PERRLA  ENMT: nc/at  Neck: supple  Respiratory: CTA B/L  Cardiovascular: RRR  Gastrointestinal: Soft, non distended, mild tenderness at the incision site; incision c/d/i  Genitourinary: Voiding spontaneously--ESRD  Extremities: SCD's in place and working bilaterally  Vascular: Palpable dp pulses bilaterally  Neurological: A&O x3  Skin: no rashes, ulcerations or lesions;  Musculoskeletal: Moving all extremities  Psychiatric: Responsive

## 2022-10-12 NOTE — PROGRESS NOTE ADULT - SUBJECTIVE AND OBJECTIVE BOX
Progress Note: Surgery  Patient: HU, MIHYEON , 62y (1960)Female   MRN: [id number]  Location: [place] [address]  Visit: 09-30-22 Inpatient  Date: 10-12-22     Events over 24h: No acute event overnight. No new complaint. Pt is hemodynamically stable. NPO @ MN. Patient dialyzed 10/11 PM preoperatively. No F/chill. No N/V.  Vital Signs Last 24 Hrs  T(C): 36.5 (12 Oct 2022 13:00), Max: 37 (12 Oct 2022 04:40)  T(F): 97.7 (12 Oct 2022 13:00), Max: 98.6 (12 Oct 2022 04:40)  HR: 68 (12 Oct 2022 13:00) (66 - 73)  BP: 124/69 (12 Oct 2022 13:00) (124/69 - 149/79)  RR: 16 (12 Oct 2022 13:00) (16 - 18)  SpO2: 97% (12 Oct 2022 13:00) (97% - 100%)    O2 Parameters below as of 12 Oct 2022 13:00  Patient On (Oxygen Delivery Method): room ai    Diet: Diet, Renal Restrictions:   For patients receiving Renal Replacement - No Protein Restr, No Conc K, No Conc Phos, Low Sodium  Low Fat (LOWFAT)  1000mL Fluid Restriction ([id number]) (10-08-22 @ 07:50)    IV Fluid: multivitamin 1 Tablet(s) Oral daily  Nephro-deonna 1 Tablet(s) Oral daily    10-11 @ 07:01  -  10-12 @ 07:00  --------------------------------------------------------  IN: 1740 mL / OUT: 1800 mL / NET: -60 mL    10-12 @ 07:01  -  10-12 @ 13:35  --------------------------------------------------------  IN: 0 mL / OUT: 0 mL / NET: 0 mL    Physical Examination:  General Appearance: NAD, alert and cooperative  Heart: S1 and S2. No murmurs. Rhythm is regular.  Lungs: Clear to auscultation BL without rales, rhonchi, wheezing, crackles or diminished breath sounds.  Abdomen: Positive bowel sounds. Soft, nondistended, mild tenderness epigastric region. No rigidity, guarding, or rebound tenderness.    Medications: [Standing]  carvedilol 3.125 milliGRAM(s) Oral every 12 hours  chlorhexidine 2% Cloths 1 Application(s) Topical daily  epoetin filiberto-epbx (RETACRIT) Injectable 21821 Unit(s) IV Push <User Schedule>  multivitamin 1 Tablet(s) Oral daily  Nephro-deonna 1 Tablet(s) Oral daily  ruxolitinib 10 milliGRAM(s) Oral <User Schedule>    Medications:[PRN]  aluminum hydroxide/magnesium hydroxide/simethicone Suspension 30 milliLiter(s) Oral every 4 hours PRN  HYDROmorphone   Tablet 1 milliGRAM(s) Oral every 4 hours PRN    Labs:  CAPILLARY BLOOD GLUCOSE  POCT Blood Glucose.: 81 mg/dL (12 Oct 2022 08:16                        9.7    8.49  )-----------( 167      ( 12 Oct 2022 05:37 )             30.9        10-12  139  |  99  |  14  ----------------------------<  59<L>  3.8   |  29  |  3.69<H>  Calcium, Total Serum: 8.4 mg/dL (10-12-22 @ 05:37)    LFTs:       7.3  | 1.2  | 26       ------------------[82      ( 12 Oct 2022 05:37 )  4.1  | 0.4  | 17          Lipase:x      Amylase:x          Coags:  13.5   ----< 1.16    ( 12 Oct 2022 05:37 )     34.9

## 2022-10-12 NOTE — PROGRESS NOTE ADULT - PROBLEM SELECTOR PROBLEM 4
Gastric varices

## 2022-10-12 NOTE — PROGRESS NOTE ADULT - PROBLEM SELECTOR PLAN 3
- Bi monthly transfusions at [facility]. Followed by Dr. Benjy Guo  - US doppler abdomen no portal or splenic vein thrombosis.  - Liver biopsy with nodular regenerative hyperplasia  - Continue Danazol  - Heme following  - s/p 2unit PRBC early in admission, Hgb now stable and at baseline

## 2022-10-12 NOTE — PROGRESS NOTE ADULT - PROBLEM SELECTOR PLAN 6
DVT proph: SCD  Dispo: Patient tolerating low fat renal diet. Pending reinstatement of dialysis post op

## 2022-10-12 NOTE — PROGRESS NOTE ADULT - ATTENDING COMMENTS
Patient seen by my staff in morning; she is in the operating room during attending rounds; She is in hospital for treatment of acute cholecystis.  There is a prior history of myelofibrosis and please follow blood counts in post operative period to assess if there is a need for packed red cell support

## 2022-10-12 NOTE — PROGRESS NOTE ADULT - ASSESSMENT
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    s/p open cholecystectomy (POD#0)  -post op labs appropriate  -purulent GB seen, continue Zosyn  -f/u OR cultures daily  -ADAT  -SQH/SCDs/Spirometry  -analgesia prn  -PT in AM    ESRD  -continue HD per Nephrology    DISPO  -transfer to [facility] telemetry once bed available

## 2022-10-12 NOTE — PROGRESS NOTE ADULT - ASSESSMENT
61 yo F hx of myelofibrosis, polycythemia vera, ESRD on MFW schedule via LUE AVF (last dialysis W), portal HTN, subclavian port for transfusions, gastric varices, gallstones, recent ERCP for choledocolithiasis s/p biliary stent, c/b post ERCP pancreatitis now presenting with fever and RUQ and epigastric pain. NM Hepatobiliary study positive for acute cholecystitis. MRCP + for acute vikas. However, US RUQ not consistent with acute cholecystitis. May likely have pancreatitis given elevated lipase and severe abdominal pain. No inpatient intervention as per surgery. 61 yo F hx of myelofibrosis, polycythemia vera, ESRD on MFW schedule via LUE AVF (last dialysis W), portal HTN, subclavian port for transfusions, gastric varices, gallstones, recent ERCP for choledocolithiasis s/p biliary stent, c/b post ERCP pancreatitis now presenting with fever and RUQ and epigastric pain. NM Hepatobiliary study positive for acute cholecystitis. MRCP + for acute vikas.

## 2022-10-12 NOTE — BRIEF OPERATIVE NOTE - OPERATION/FINDINGS
s/p open cholecystectomy via Kocher incision. Found to have purulent cholecystitis. Cystic artery and duct identified and ligated

## 2022-10-12 NOTE — PROGRESS NOTE ADULT - SUBJECTIVE AND OBJECTIVE BOX
Hematology Oncology Follow-up    INTERVAL HPI/OVERNIGHT EVENTS:  No o/n events, patient resting comfortably. Denies abd pain.    VITAL SIGNS:  T(F): 98.6 (10-12-22 @ 04:40)  HR: 69 (10-12-22 @ 04:40)  BP: 135/73 (10-12-22 @ 04:40)  RR: 18 (10-12-22 @ 04:40)  SpO2: 97% (10-12-22 @ 04:40)  Wt(kg): --    10-11-22 @ 07:01  -  10-12-22 @ 07:00  --------------------------------------------------------  IN: 1740 mL / OUT: 1800 mL / NET: -60 mL    10-12-22 @ 07:01  -  10-12-22 @ 11:05  --------------------------------------------------------  IN: 0 mL / OUT: 0 mL / NET: 0 mL          Review of Systems:  General: denies fevers/chills  Respiratory: denies cough, shortness of breath  Cardiovascular: denies chest pain, palpitations  Gastrointestinal: denies nausea, vomiting, abdominal pain, constipation, diarrhea, melena, hematochezia  MSK: denies joint pain or muscle pain  Neuro: denies headache, weakness, or parasthesias  Skin: denies rash, petichiae, echymoses  Psych: denies anxiety or sleep disturbances    PHYSICAL EXAM:  Constitutional: NAD  Respiratory: symmetric chest rise, with normal respiratory effort  Cardiovascular: RRR  Gastrointestinal: soft, NTND  Extremities:  no edema  MSK: no obvious abnormalities  Neurological: Grossly intact  Skin: no rash, no echymoses, no petichiae  Psych: normal affect    MEDICATIONS  (STANDING):  carvedilol 3.125 milliGRAM(s) Oral every 12 hours  chlorhexidine 2% Cloths 1 Application(s) Topical daily  epoetin filiberto-epbx (RETACRIT) Injectable 78811 Unit(s) IV Push <User Schedule>  multivitamin 1 Tablet(s) Oral daily  Nephro-deonna 1 Tablet(s) Oral daily  ruxolitinib 10 milliGRAM(s) Oral <User Schedule>    MEDICATIONS  (PRN):  aluminum hydroxide/magnesium hydroxide/simethicone Suspension 30 milliLiter(s) Oral every 4 hours PRN Dyspepsia  HYDROmorphone   Tablet 1 milliGRAM(s) Oral every 4 hours PRN Moderate Pain (4 - 6)      No Known Allergies      LABS:                        9.7    8.49  )-----------( 167      ( 12 Oct 2022 05:37 )             30.9     10-12    139  |  99  |  14  ----------------------------<  59<L>  3.8   |  29  |  3.69<H>    Ca    8.4      12 Oct 2022 05:37  Phos  2.5     10-12  Mg     2.3     10-12    TPro  7.3  /  Alb  4.1  /  TBili  1.2  /  DBili  0.4<H>  /  AST  26  /  ALT  17  /  AlkPhos  82  10-12    PT/INR - ( 12 Oct 2022 05:37 )   PT: 13.5 sec;   INR: 1.16 ratio         PTT - ( 12 Oct 2022 05:37 )  PTT:34.9 sec             Bilirubin Direct, Serum: 0.4 *H* (10-12-22 @ 05:37)      RADIOLOGY & ADDITIONAL TESTS:  Studies reviewed.

## 2022-10-12 NOTE — PROGRESS NOTE ADULT - ATTENDING COMMENTS
63 yo F hx of myelofibrosis, polycythemia vera, ESRD on MWF schedule via LUE AVF (last dialysis W), portal HTN, subclavian port for transfusions, gastric varices, gallstones, recent ERCP for choledocholithiasis s/p biliary stent, c/b post ERCP pancreatitis + pneumobilia now presenting with fever and RUQ and epigastric pain 2/2 cholecystitis. Pain improved, completed antibiotics, plan for OR today. Exam: soft abdomen, nontender to palpation    #acute vikas  #pancreatitis  #ESRD  #gastric varices/portal HTN  #myelofibrosis  #PCV  #hx of ERCP pancreatitis    - Patient evaluated at bedside, declining  today. Denies pain, has been NPO.   - Acute vikas noted, s/p course of abx. OR today for vikas  - HD schedule per Derik gonzalez after HD per heme  - will need outpt ERCP in 2 months to remove stent  - pain control per surgery  - transfer to surgery service post-op    d/w HS 6    Sylvia Escobedo MD  Division of Hospital Medicine  Pager: [phone] or reachable on MS Teams  After hours please page [phone]

## 2022-10-12 NOTE — PROGRESS NOTE ADULT - ASSESSMENT
Assessment:  62y Female patient admitted with relevant PMH of PV, PV (JAK2+), ESRD on HD, HTN, noncirrhotic portal HTN 2/2 Nodular regenerative hyperplasia complicated with  gastric varices, choledocholithiasis s/p ERCP 9/22 c/b post ERCP pancreatitis readmitted to hospital with persisten epigastric abdominal pain on exam. OR today for open cholecystectomy    Plan:  - OR today  - will transfer to transplant surgery post operatively  - all preoperative studies reviewed and WNL  - NPO@MN  - d/w Dr. Dagher    Date/Time: 10-11-22 @ 11:16

## 2022-10-12 NOTE — PROGRESS NOTE ADULT - PROBLEM SELECTOR PLAN 1
- Last admission pt with cholelithiasis and choledocholithiasis. Had ERCP with stone removal c/b post procedure pancreatitis that delayed surgery. Cholelithiasis and a focus of antidependent air seen on CT. Gallbladder wall thickening/edema on CT from 9/26. May be acute cholecystitis and pancreatitis. Abdominal pain improved today from fluids and bowel rest; s/p course of abx  - HIDA scan positive for cholecystitis  - MRCP with mildly distended gallbladder with nonspecific diffuse wall thickening and   air from instrumentation, possible vikas  - Patient without elevated WBC but has myelofibrosis. afebrile. BCx NGTD.   - Empiric zosyn 9/30-->10/9 last dose in am  - Low fat diet.  - Transplant surgery team reevaluated her 10/11 am, will proceed with 10/12 PM cholecystectomy, COVID swab, pre op am labs, echo, anesthesia, urgent HD 10/11 pm is ordered in preparation for the vikas. - Last admission pt with cholelithiasis and choledocholithiasis. Had ERCP with stone removal c/b post procedure pancreatitis that delayed surgery. Cholelithiasis and a focus of antidependent air seen on CT. Gallbladder wall thickening/edema on CT from 9/26. May be acute cholecystitis and pancreatitis. Abdominal pain improved today from fluids and bowel rest; s/p course of abx  - HIDA scan positive for cholecystitis  - MRCP with mildly distended gallbladder with nonspecific diffuse wall thickening and   air from instrumentation, possible vikas  - Patient without elevated WBC but has myelofibrosis. afebrile. BCx NGTD.   - Empiric zosyn 9/30-->10/9 last dose in am  - Low fat diet.  - s/p open cholecystectomy via Kocher incision. Found to have purulent cholecystitis. Cystic artery and duct identified and ligated

## 2022-10-12 NOTE — PROGRESS NOTE ADULT - PROBLEM SELECTOR PLAN 2
- MWF via AVF left arm as scheduled, HD 10/11pm ordered  - Nephrology following. EPO post dialysis  - Jakafi post dialysis. Nephro-deonna. Holding calcium acetate due to low phos.   - Renal diet - MWF via AVF left arm as scheduled, HD 10/11pm ordered  - Nephrology following. EPO post dialysis  - Jakafi can be resume on dialysis days since 10/11. Nephro-deonna. Holding calcium acetate due to low phos.   - Renal diet

## 2022-10-13 LAB
ALBUMIN SERPL ELPH-MCNC: 3.7 G/DL — SIGNIFICANT CHANGE UP (ref 3.3–5)
ALP SERPL-CCNC: 74 U/L — SIGNIFICANT CHANGE UP (ref 40–120)
ALT FLD-CCNC: 19 U/L — SIGNIFICANT CHANGE UP (ref 10–45)
ANION GAP SERPL CALC-SCNC: 15 MMOL/L — SIGNIFICANT CHANGE UP (ref 5–17)
AST SERPL-CCNC: 25 U/L — SIGNIFICANT CHANGE UP (ref 10–40)
BASOPHILS # BLD AUTO: 0.1 K/UL — SIGNIFICANT CHANGE UP (ref 0–0.2)
BASOPHILS NFR BLD AUTO: 0.8 % — SIGNIFICANT CHANGE UP (ref 0–2)
BILIRUB DIRECT SERPL-MCNC: 0.7 MG/DL — HIGH (ref 0–0.3)
BILIRUB INDIRECT FLD-MCNC: 0.7 MG/DL — SIGNIFICANT CHANGE UP (ref 0.2–1)
BILIRUB SERPL-MCNC: 1.4 MG/DL — HIGH (ref 0.2–1.2)
BUN SERPL-MCNC: 23 MG/DL — SIGNIFICANT CHANGE UP (ref 7–23)
CALCIUM SERPL-MCNC: 8.1 MG/DL — LOW (ref 8.4–10.5)
CHLORIDE SERPL-SCNC: 96 MMOL/L — SIGNIFICANT CHANGE UP (ref 96–108)
CO2 SERPL-SCNC: 24 MMOL/L — SIGNIFICANT CHANGE UP (ref 22–31)
CREAT SERPL-MCNC: 5.19 MG/DL — HIGH (ref 0.5–1.3)
EGFR: 9 ML/MIN/1.73M2 — LOW
EOSINOPHIL # BLD AUTO: 0.12 K/UL — SIGNIFICANT CHANGE UP (ref 0–0.5)
EOSINOPHIL NFR BLD AUTO: 1 % — SIGNIFICANT CHANGE UP (ref 0–6)
GLUCOSE SERPL-MCNC: 141 MG/DL — HIGH (ref 70–99)
GRAM STN FLD: SIGNIFICANT CHANGE UP
HCT VFR BLD CALC: 30.5 % — LOW (ref 34.5–45)
HGB BLD-MCNC: 9.3 G/DL — LOW (ref 11.5–15.5)
IMM GRANULOCYTES NFR BLD AUTO: 2.8 % — HIGH (ref 0–0.9)
LYMPHOCYTES # BLD AUTO: 0.57 K/UL — LOW (ref 1–3.3)
LYMPHOCYTES # BLD AUTO: 4.7 % — LOW (ref 13–44)
MAGNESIUM SERPL-MCNC: 2.3 MG/DL — SIGNIFICANT CHANGE UP (ref 1.6–2.6)
MCHC RBC-ENTMCNC: 29.6 PG — SIGNIFICANT CHANGE UP (ref 27–34)
MCHC RBC-ENTMCNC: 30.5 GM/DL — LOW (ref 32–36)
MCV RBC AUTO: 97.1 FL — SIGNIFICANT CHANGE UP (ref 80–100)
MONOCYTES # BLD AUTO: 0.59 K/UL — SIGNIFICANT CHANGE UP (ref 0–0.9)
MONOCYTES NFR BLD AUTO: 4.9 % — SIGNIFICANT CHANGE UP (ref 2–14)
NEUTROPHILS # BLD AUTO: 10.31 K/UL — HIGH (ref 1.8–7.4)
NEUTROPHILS NFR BLD AUTO: 85.8 % — HIGH (ref 43–77)
NRBC # BLD: 0 /100 WBCS — SIGNIFICANT CHANGE UP (ref 0–0)
PHOSPHATE SERPL-MCNC: 4.4 MG/DL — SIGNIFICANT CHANGE UP (ref 2.5–4.5)
PLATELET # BLD AUTO: 152 K/UL — SIGNIFICANT CHANGE UP (ref 150–400)
POTASSIUM SERPL-MCNC: 4.7 MMOL/L — SIGNIFICANT CHANGE UP (ref 3.5–5.3)
POTASSIUM SERPL-SCNC: 4.7 MMOL/L — SIGNIFICANT CHANGE UP (ref 3.5–5.3)
PROT SERPL-MCNC: 6.9 G/DL — SIGNIFICANT CHANGE UP (ref 6–8.3)
RBC # BLD: 3.14 M/UL — LOW (ref 3.8–5.2)
RBC # FLD: 18.9 % — HIGH (ref 10.3–14.5)
SODIUM SERPL-SCNC: 135 MMOL/L — SIGNIFICANT CHANGE UP (ref 135–145)
SPECIMEN SOURCE: SIGNIFICANT CHANGE UP
WBC # BLD: 12.03 K/UL — HIGH (ref 3.8–10.5)
WBC # FLD AUTO: 12.03 K/UL — HIGH (ref 3.8–10.5)

## 2022-10-13 PROCEDURE — 99255 IP/OBS CONSLTJ NEW/EST HI 80: CPT

## 2022-10-13 PROCEDURE — 99233 SBSQ HOSP IP/OBS HIGH 50: CPT | Mod: GC

## 2022-10-13 RX ORDER — CASPOFUNGIN ACETATE 7 MG/ML
INJECTION, POWDER, LYOPHILIZED, FOR SOLUTION INTRAVENOUS
Refills: 0 | Status: DISCONTINUED | OUTPATIENT
Start: 2022-10-13 | End: 2022-10-18

## 2022-10-13 RX ORDER — CASPOFUNGIN ACETATE 7 MG/ML
50 INJECTION, POWDER, LYOPHILIZED, FOR SOLUTION INTRAVENOUS EVERY 24 HOURS
Refills: 0 | Status: DISCONTINUED | OUTPATIENT
Start: 2022-10-14 | End: 2022-10-18

## 2022-10-13 RX ORDER — HYDROMORPHONE HYDROCHLORIDE 2 MG/ML
0.5 INJECTION INTRAMUSCULAR; INTRAVENOUS; SUBCUTANEOUS ONCE
Refills: 0 | Status: DISCONTINUED | OUTPATIENT
Start: 2022-10-13 | End: 2022-10-13

## 2022-10-13 RX ORDER — CASPOFUNGIN ACETATE 7 MG/ML
70 INJECTION, POWDER, LYOPHILIZED, FOR SOLUTION INTRAVENOUS ONCE
Refills: 0 | Status: COMPLETED | OUTPATIENT
Start: 2022-10-13 | End: 2022-10-13

## 2022-10-13 RX ADMIN — HYDROMORPHONE HYDROCHLORIDE 0.5 MILLIGRAM(S): 2 INJECTION INTRAMUSCULAR; INTRAVENOUS; SUBCUTANEOUS at 00:17

## 2022-10-13 RX ADMIN — Medication 650 MILLIGRAM(S): at 04:00

## 2022-10-13 RX ADMIN — OXYCODONE HYDROCHLORIDE 5 MILLIGRAM(S): 5 TABLET ORAL at 21:40

## 2022-10-13 RX ADMIN — Medication 650 MILLIGRAM(S): at 04:30

## 2022-10-13 RX ADMIN — HYDROMORPHONE HYDROCHLORIDE 0.5 MILLIGRAM(S): 2 INJECTION INTRAMUSCULAR; INTRAVENOUS; SUBCUTANEOUS at 04:32

## 2022-10-13 RX ADMIN — HYDROMORPHONE HYDROCHLORIDE 0.5 MILLIGRAM(S): 2 INJECTION INTRAMUSCULAR; INTRAVENOUS; SUBCUTANEOUS at 18:20

## 2022-10-13 RX ADMIN — Medication 650 MILLIGRAM(S): at 10:30

## 2022-10-13 RX ADMIN — DANAZOL 200 MILLIGRAM(S): 200 CAPSULE ORAL at 13:37

## 2022-10-13 RX ADMIN — Medication 650 MILLIGRAM(S): at 22:29

## 2022-10-13 RX ADMIN — Medication 650 MILLIGRAM(S): at 09:57

## 2022-10-13 RX ADMIN — HYDROMORPHONE HYDROCHLORIDE 0.5 MILLIGRAM(S): 2 INJECTION INTRAMUSCULAR; INTRAVENOUS; SUBCUTANEOUS at 08:45

## 2022-10-13 RX ADMIN — Medication 650 MILLIGRAM(S): at 15:49

## 2022-10-13 RX ADMIN — CARVEDILOL PHOSPHATE 3.12 MILLIGRAM(S): 80 CAPSULE, EXTENDED RELEASE ORAL at 18:20

## 2022-10-13 RX ADMIN — HEPARIN SODIUM 5000 UNIT(S): 5000 INJECTION INTRAVENOUS; SUBCUTANEOUS at 21:59

## 2022-10-13 RX ADMIN — PIPERACILLIN AND TAZOBACTAM 200 GRAM(S): 4; .5 INJECTION, POWDER, LYOPHILIZED, FOR SOLUTION INTRAVENOUS at 11:32

## 2022-10-13 RX ADMIN — HYDROMORPHONE HYDROCHLORIDE 0.5 MILLIGRAM(S): 2 INJECTION INTRAMUSCULAR; INTRAVENOUS; SUBCUTANEOUS at 13:36

## 2022-10-13 RX ADMIN — CASPOFUNGIN ACETATE 260 MILLIGRAM(S): 7 INJECTION, POWDER, LYOPHILIZED, FOR SOLUTION INTRAVENOUS at 13:37

## 2022-10-13 RX ADMIN — HYDROMORPHONE HYDROCHLORIDE 0.5 MILLIGRAM(S): 2 INJECTION INTRAMUSCULAR; INTRAVENOUS; SUBCUTANEOUS at 09:00

## 2022-10-13 RX ADMIN — OXYCODONE HYDROCHLORIDE 5 MILLIGRAM(S): 5 TABLET ORAL at 20:49

## 2022-10-13 RX ADMIN — Medication 650 MILLIGRAM(S): at 21:59

## 2022-10-13 RX ADMIN — HEPARIN SODIUM 5000 UNIT(S): 5000 INJECTION INTRAVENOUS; SUBCUTANEOUS at 06:37

## 2022-10-13 RX ADMIN — HYDROMORPHONE HYDROCHLORIDE 0.5 MILLIGRAM(S): 2 INJECTION INTRAMUSCULAR; INTRAVENOUS; SUBCUTANEOUS at 18:35

## 2022-10-13 RX ADMIN — CARVEDILOL PHOSPHATE 3.12 MILLIGRAM(S): 80 CAPSULE, EXTENDED RELEASE ORAL at 06:38

## 2022-10-13 RX ADMIN — DANAZOL 200 MILLIGRAM(S): 200 CAPSULE ORAL at 06:38

## 2022-10-13 RX ADMIN — HYDROMORPHONE HYDROCHLORIDE 0.5 MILLIGRAM(S): 2 INJECTION INTRAMUSCULAR; INTRAVENOUS; SUBCUTANEOUS at 05:00

## 2022-10-13 RX ADMIN — Medication 1 TABLET(S): at 11:33

## 2022-10-13 RX ADMIN — HYDROMORPHONE HYDROCHLORIDE 0.5 MILLIGRAM(S): 2 INJECTION INTRAMUSCULAR; INTRAVENOUS; SUBCUTANEOUS at 22:29

## 2022-10-13 RX ADMIN — Medication 650 MILLIGRAM(S): at 16:19

## 2022-10-13 RX ADMIN — CHLORHEXIDINE GLUCONATE 1 APPLICATION(S): 213 SOLUTION TOPICAL at 23:11

## 2022-10-13 RX ADMIN — HYDROMORPHONE HYDROCHLORIDE 0.5 MILLIGRAM(S): 2 INJECTION INTRAMUSCULAR; INTRAVENOUS; SUBCUTANEOUS at 22:04

## 2022-10-13 RX ADMIN — DANAZOL 200 MILLIGRAM(S): 200 CAPSULE ORAL at 23:11

## 2022-10-13 RX ADMIN — HYDROMORPHONE HYDROCHLORIDE 0.5 MILLIGRAM(S): 2 INJECTION INTRAMUSCULAR; INTRAVENOUS; SUBCUTANEOUS at 13:51

## 2022-10-13 RX ADMIN — HYDROMORPHONE HYDROCHLORIDE 0.5 MILLIGRAM(S): 2 INJECTION INTRAMUSCULAR; INTRAVENOUS; SUBCUTANEOUS at 01:00

## 2022-10-13 RX ADMIN — HEPARIN SODIUM 5000 UNIT(S): 5000 INJECTION INTRAVENOUS; SUBCUTANEOUS at 13:36

## 2022-10-13 NOTE — CONSULT NOTE ADULT - ASSESSMENT
62 year old female PMH  myelofibrosis, polycythemia vera, ESRD on MFW schedule via LUE AVF (last dialysis W), portal HTN, subclavian port for transfusions, gastric varices, gallstones, recent ERCP for choledocolithiasis s/p biliary stent, c/b post ERCP pancreatitis + pneumobilia who presented with fever and abdominal pain.     COVID19/FLU/RSV PCR (9/30) Negative  COVID19 PCR (10/7, 10/11) Negative  MRSA/MSSA PCR (10/2) Negative  BCx (9/30) NGTD    MRCP (10/4) Mildly distended gallbladder with nonspecific diffuse wall thickening and   air and Few scattered subcentimeter hepatic lesions are suspicious for microabscesses.    On 10/12/22 s/p open cholecystectomy   Bile Cultures (10/12) Gram negative rods and Yeast on gram stain    Antibiotic Course:  Zosyn: 9/30 --> 10/9, 10/12 -->     #Suppurative Cholecystitis, Fever, Positive Culture Finding (Yeast and gram negatives on bile culture)  --Recommend addition of Caspofungin 70mg x1 followed by 50 mg IV Q24H  --Continue Zosyn 3.375 mg IV Q8H  --Continue to follow CBC with diff  --Continue to follow transaminases  --Continue to follow temperature curve  --Follow up on preliminary operative cultures    I will continue to follow. Please feel free to contact me with any further questions.    Juancho Kelly M.D.  [facility] Division of Infectious Disease  8AM-5PM Monday - Friday: Available on [employer] Teams  After Hours and Holidays (or if no response on Microsoft Teams): Please contact the Infectious Diseases Office at [phone]    The above assessment and plan were discussed with Dr Dagher

## 2022-10-13 NOTE — PHYSICAL THERAPY INITIAL EVALUATION ADULT - DID THE PATIENT HAVE SURGERY?
s/p open cholecystectomy via Kocher incision. Found to have purulent cholecystitis. Cystic artery and duct identified and ligated/yes

## 2022-10-13 NOTE — CONSULT NOTE ADULT - ASSESSMENT
62 year old female with history of myelofibrosis, polycythemia vera, ESRD on MWF schedule via LUE AVF (last dialysis W), portal HTN, subclavian port for transfusions, gastric varices, gallstones, ERCP 9/22/2022 for sludge and stones s/p metal biliary stent c/b post ERCP pancreatitis who presents with fever and RUQ and epigastric pain.     # Acute cholecystitis s/p CCY on 10/12/2022  # History of biliary sludge and stones s/p 10mm x 4cm covered metal stent in CBD placed 9/22/2022  # IGV1    Recommendations:  -trend clinical symptoms, exam findings, vital signs, CBC, CMP, INR  -metal biliary stent placed 22 days ago, usually remains in place for 6+ months, no emergent indication for stent removal  -can tentatively plan for stent removal as outpatient vs tomorrow    Note incomplete until finalized by attending signature/attestation.    Neymar Adams  GI/Hepatology Fellow    MONDAY-FRIDAY 8AM-5PM:  Pager# [phone] ([facility]) or [phone] ([facility])    NON-URGENT CONSULTS:  Please email [email or web address]  AT NIGHT AND ON WEEKENDS:  Contact on-call GI fellow via answering service ([phone]) from 5pm-8am and on weekends/holidays     62 year old female with history of myelofibrosis, polycythemia vera, ESRD on MWF schedule via LUE AVF (last dialysis W), portal HTN, subclavian port for transfusions, gastric varices, gallstones, ERCP 9/22/2022 for sludge and stones s/p metal biliary stent c/b post ERCP pancreatitis who presents with fever and RUQ and epigastric pain.     # Acute cholecystitis s/p CCY on 10/12/2022  # History of biliary sludge and stones s/p 10mm x 4cm covered metal stent in CBD placed 9/22/2022  # IGV1    Recommendations:  - trend clinical symptoms, exam findings, vital signs, CBC, CMP, INR  - no emergent indication for stent removal  - tentatively plan for stent removal tomorrow otherwise will schedule as an outpatient.    Note incomplete until finalized by attending signature/attestation.    Neymar Adams  GI/Hepatology Fellow    MONDAY-FRIDAY 8AM-5PM:  Pager# [phone] ([facility]) or [phone] ([facility])    NON-URGENT CONSULTS:  Please email [email or web address]  AT NIGHT AND ON WEEKENDS:  Contact on-call GI fellow via answering service ([phone]) from 5pm-8am and on weekends/holidays

## 2022-10-13 NOTE — PHYSICAL THERAPY INITIAL EVALUATION ADULT - GENERAL OBSERVATIONS, REHAB EVAL
Pt rec'd semisupine in bed, in NAD, +IVL, +PACU monitoring. Agreeable to PT. RN cleared pt to be seen.

## 2022-10-13 NOTE — PROGRESS NOTE ADULT - ATTENDING COMMENTS
61 yo F hx of myelofibrosis, polycythemia vera, ESRD on MWf schedule via LUE AVF (last dialysis W), portal HTN, subclavian port for transfusions, gastric varices, gallstones, recent ERCP for choledocolithiasis s/p biliary stent, c/b post ERCP pancreatitis now presenting with fever and RUQ and epigastric pain, and fever.  Seen 10/11 pm.  No complaints in preparation for cholcystectomy  1.  ESRD--HD today and TIW  2.  Anemia--INDER titration goal hgb 10  3.  Secondary hyperparathyroidism--PO4 low, hold binder  discussed with med team  Jignesh Martinez MD  contact me on TEAMS

## 2022-10-13 NOTE — CONSULT NOTE ADULT - PROVIDER SPECIALTY LIST ADULT
Transplant Surgery
Gastroenterology
Hepatology
Nephrology
Heme/Onc
Infectious Disease
Surgery
Intervent Radiology

## 2022-10-13 NOTE — PROVIDER CONTACT NOTE (CRITICAL VALUE NOTIFICATION) - BACKGROUND
PMH: ESRD-on HD, pancreatitis, portal HTN, Polycytemis, LUE AV fistula, Cholelithiasis-stent, pus in gallbladder

## 2022-10-13 NOTE — PROGRESS NOTE ADULT - ASSESSMENT
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    s/p open cholecystectomy (POD#1)  - OR bile fluid growing GNR + yeast like cells.  FU Cx  - ID consulted: Continue Zosyn. Start Caspo  -ADAT  -SQH/SCDs/Spirometry  - PRN pain medications  - Will plan for ERCP w/ stent removal this admission  - PT consult    ESRD  - HD today as per Nephrology    DISPO  -transfer to [facility] telemetry once bed available

## 2022-10-13 NOTE — PHYSICAL THERAPY INITIAL EVALUATION ADULT - PERTINENT HX OF CURRENT PROBLEM, REHAB EVAL
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 10/12/22 OR s/p open cholecystectomy via Kocher incision. Found to have purulent cholecystitis.

## 2022-10-13 NOTE — CONSULT NOTE ADULT - SUBJECTIVE AND OBJECTIVE BOX
HPI:  MIHYEON HU is a 62 year old female with history of myelofibrosis, polycythemia vera, ESRD on MWF schedule via LUE AVF (last dialysis W), portal HTN, subclavian port for transfusions, gastric varices, gallstones, ERCP 9/22/2022 for sludge and stones s/p metal biliary stent c/b post ERCP pancreatitis who presents with fever and RUQ and epigastric pain.     Patient presents with fever and RUQ pain and ultimately underwent open cholecystectomy on 10/12/2022 for acute cholecystitis.  Advanced GI consulted for biliary stent removal.  Patient underwent ERCP on 9/22/2022 for biliary sludge and stones for which a 10mm x 4cm covered metal stent was placed in the CBD [endoscopy also revealed IGV1].  Currently, patient still in PACU endorses post-operative pain over RUQ, however afebrile, hemodynamically stable, and liver chemistries stable.    ROS:   General:  No fevers, chills, night sweats, fatigue  Eyes:  Good vision, no reported pain  ENT:  No sore throat, pain, runny nose  CV:  No pain, palpitations  Pulm:  No dyspnea, cough  GI:  See HPI, otherwise negative  :  No  incontinence, nocturia  Muscle:  No pain, weakness  Neuro:  No memory problems  Psych:  No insomnia, mood problems, depression  Endocrine:  No polyuria, polydipsia, cold/heat intolerance  Heme:  No petechiae, ecchymosis, easy bruisability  Skin:  No rash    PMHX/PSHX:    Polycythemia vera    Peptic ulcer disease    Hypertension    Splenomegaly    Splenic infarct    ESRD on peritoneal dialysis    Cirrhosis of liver without ascites, unspecified hepatic cirrhosis type    Pancreatic cyst    History of myelofibrosis    History of myelofibrosis    No significant past surgical history    Peritoneal dialysis catheter in place    Hemoperitoneum as complication of peritoneal dialysis    AV fistula      Allergies:  No Known Allergies      Home Medications: reviewed  Hospital Medications:  acetaminophen     Tablet .. 650 milliGRAM(s) Oral every 6 hours  carvedilol 3.125 milliGRAM(s) Oral every 12 hours  caspofungin IVPB      chlorhexidine 2% Cloths 1 Application(s) Topical daily  danazol 200 milliGRAM(s) Oral three times a day  epoetin filiberto-epbx (RETACRIT) Injectable 92065 Unit(s) IV Push <User Schedule>  heparin   Injectable 5000 Unit(s) SubCutaneous every 8 hours  HYDROmorphone  Injectable 0.5 milliGRAM(s) IV Push every 4 hours PRN  HYDROmorphone  Injectable 0.25 milliGRAM(s) IV Push every 10 minutes PRN  multivitamin 1 Tablet(s) Oral daily  Nephro-deonna 1 Tablet(s) Oral daily  oxyCODONE    IR 5 milliGRAM(s) Oral every 6 hours PRN  piperacillin/tazobactam IVPB.. 3.375 Gram(s) IV Intermittent every 12 hours      Social History:   Tobacco: denies  Alcohol: denies  Recreational drugs: denies    Family history:    No pertinent family history in first degree relatives    Family history of hypertension in mother    Family history of malignant neoplasm (Grandparent)    FH: cirrhosis (Sibling)      Denies family history of colon cancer/polyps, stomach cancer/polyps, pancreatic cancer/masses, liver cancer/disease, ovarian cancer and endometrial cancer.    PHYSICAL EXAM:   Vital Signs:  Vital Signs Last 24 Hrs  T(C): 36.8 (13 Oct 2022 16:00), Max: 36.8 (13 Oct 2022 00:00)  T(F): 98.2 (13 Oct 2022 16:00), Max: 98.2 (13 Oct 2022 00:00)  HR: 68 (13 Oct 2022 16:00) (63 - 85)  BP: 119/58 (13 Oct 2022 16:00) (106/58 - 178/78)  BP(mean): 81 (13 Oct 2022 12:00) (78 - 116)  RR: 16 (13 Oct 2022 16:00) (14 - 20)  SpO2: 100% (13 Oct 2022 16:00) (94% - 100%)    Parameters below as of 13 Oct 2022 08:00  Patient On (Oxygen Delivery Method): room air      Daily     Daily     GENERAL: no acute distress  NEURO: alert  HEENT: NCAT, no conjunctival pallor appreciated  CHEST: no respiratory distress, no accessory muscle use  CARDIAC: regular rate, +S1/S2  ABDOMEN: soft, RUQ tenderness in setting of recent CCY, no rebound or guarding  EXTREMITIES: warm, well perfused  SKIN: no lesions noted    LABS: reviewed                        9.3    12.03 )-----------( 152      ( 13 Oct 2022 03:12 )             30.5     10-13    135  |  96  |  23  ----------------------------<  141<H>  4.7   |  24  |  5.19<H>    Ca    8.1<L>      13 Oct 2022 03:12  Phos  4.4     10-13  Mg     2.3     10-13    TPro  6.9  /  Alb  3.7  /  TBili  1.4<H>  /  DBili  0.7<H>  /  AST  25  /  ALT  19  /  AlkPhos  74  10-13    LIVER FUNCTIONS - ( 13 Oct 2022 03:12 )  Alb: 3.7 g/dL / Pro: 6.9 g/dL / ALK PHOS: 74 U/L / ALT: 19 U/L / AST: 25 U/L / GGT: x             Culture - Fungal, Body Fluid (collected 12 Oct 2022 21:59)  Source: Bile Bile Fluid  Preliminary Report (13 Oct 2022 11:08):    Testing in progress    Culture - Body Fluid with Gram Stain (collected 12 Oct 2022 21:59)  Source: Bile Bile Fluid  Gram Stain (13 Oct 2022 04:41):    polymorphonuclear leukocytes seen    Yeast like cells seen    Gram Negative Rods seen    by cytocentrifuge        Diagnostic Studies: see sunrise for full report

## 2022-10-13 NOTE — CONSULT NOTE ADULT - SUBJECTIVE AND OBJECTIVE BOX
Patient is a 62y old  Female who presents with a chief complaint of Abdominal pain (13 Oct 2022 08:58)      HPI:  63 yo F hx of myelofibrosis, polycythemia vera, ESRD on MFW schedule via LUE AVF (last dialysis W), portal HTN, subclavian port for transfusions, gastric varices, gallstones, recent ERCP for choledocolithiasis s/p biliary stent, c/b post ERCP pancreatitis + pneumobilia now presenting with fever and RUQ and epigastric pain. States pain has been ongoing for past 2 weeks, and has been in ER 3 times over this time. [place] chills last night, noted to be febrile this morning. Nausea and few episodes of nbnb emesis this AM.    ED Course:  Febrile to 100.5. Normal HR and BP. Pt was given tylenol, zosyn, and blood cultures obtained. U/S abdomen showed no acute pathology, CBD stent with pneumobilia, marked splenomegaly with upper abdominal varices. CT CA/P with contrast showed the same. No imaging evidence of cirrhosis. General surgery with no intervention. Nephrology also consulted will dialyze as per schedule.    COVID19/FLU/RSV PCR (9/30) Negative  COVID19 PCR (10/7, 10/11) Negative  MRSA/MSSA PCR (10/2) Negative  BCx (9/30) NGTD    On 10/12/22 s/p open cholecystectomy   Bile Cultures (10/12) Gram negative rods and Yeast on gram stain    MRCP (10/4) Mildly distended gallbladder with nonspecific diffuse wall thickening and   air and Few scattered subcentimeter hepatic lesions are suspicious for microabscesses.    Antibiotic Course:  Zosyn: 9/30 --> 10/9, 10/12 -->      prior hospital charts reviewed [  ]  primary team notes reviewed [  ]  other consultant notes reviewed [  ]    PAST MEDICAL & SURGICAL HISTORY:  Polycythemia vera  and secondary myelofibrosis      Peptic ulcer disease      Hypertension      Splenomegaly  2015      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      Pancreatic cyst      History of myelofibrosis      History of myelofibrosis      Peritoneal dialysis catheter in place  Placed 8/9/2017      Hemoperitoneum as complication of peritoneal dialysis  s/p removal 9/18      AV fistula  Placed 9/18          Allergies  No Known Allergies    ANTIMICROBIALS (past 90 days)  MEDICATIONS  (STANDING):  piperacillin/tazobactam IVPB.   200 mL/Hr IV Intermittent (09-30-22 @ 11:27)    piperacillin/tazobactam IVPB.   200 mL/Hr IV Intermittent (10-12-22 @ 18:40)    piperacillin/tazobactam IVPB.-   200 mL/Hr IV Intermittent (10-12-22 @ 23:44)    piperacillin/tazobactam IVPB..   25 mL/Hr IV Intermittent (10-04-22 @ 12:28)   25 mL/Hr IV Intermittent (10-04-22 @ 01:18)   25 mL/Hr IV Intermittent (10-03-22 @ 12:20)   25 mL/Hr IV Intermittent (10-03-22 @ 00:20)   25 mL/Hr IV Intermittent (10-02-22 @ 14:14)   25 mL/Hr IV Intermittent (10-01-22 @ 23:37)   25 mL/Hr IV Intermittent (10-01-22 @ 12:16)   25 mL/Hr IV Intermittent (10-01-22 @ 00:07)    piperacillin/tazobactam IVPB..   25 mL/Hr IV Intermittent (10-09-22 @ 00:43)   25 mL/Hr IV Intermittent (10-08-22 @ 11:22)   25 mL/Hr IV Intermittent (10-08-22 @ 00:29)   25 mL/Hr IV Intermittent (10-07-22 @ 14:31)   25 mL/Hr IV Intermittent (10-07-22 @ 00:32)   25 mL/Hr IV Intermittent (10-06-22 @ 15:32)   25 mL/Hr IV Intermittent (10-06-22 @ 00:27)   25 mL/Hr IV Intermittent (10-05-22 @ 17:02)   25 mL/Hr IV Intermittent (10-05-22 @ 00:53)      ANTIMICROBIALS:    piperacillin/tazobactam IVPB.. 3.375 every 12 hours    OTHER MEDS: MEDICATIONS  (STANDING):  acetaminophen     Tablet .. 650 every 6 hours  carvedilol 3.125 every 12 hours  danazol 200 three times a day  epoetin filiberto-epbx (RETACRIT) Injectable 43732 <User Schedule>  heparin   Injectable 5000 every 8 hours  HYDROmorphone  Injectable 0.5 every 4 hours PRN  HYDROmorphone  Injectable 0.25 every 10 minutes PRN  oxyCODONE    IR 5 every 6 hours PRN    SOCIAL HISTORY:   hx smoking  non-smoker    FAMILY HISTORY:  Family history of hypertension in mother    Family history of malignant neoplasm (Grandparent)  head and neck in father    FH: cirrhosis (Sibling)      REVIEW OF SYSTEMS  [  ] ROS unobtainable because:    [  ] All other systems negative except as noted below:	    Constitutional:  [ ] fever [ ] chills  [ ] weight loss  [ ] weakness  Skin:  [ ] rash [ ] phlebitis	  Eyes: [ ] icterus [ ] pain  [ ] discharge	  ENMT: [ ] sore throat  [ ] thrush [ ] ulcers [ ] exudates  Respiratory: [ ] dyspnea [ ] hemoptysis [ ] cough [ ] sputum	  Cardiovascular:  [ ] chest pain [ ] palpitations [ ] edema	  Gastrointestinal:  [ ] nausea [ ] vomiting [ ] diarrhea [ ] constipation [ ] pain	  Genitourinary:  [ ] dysuria [ ] frequency [ ] hematuria [ ] discharge [ ] flank pain  [ ] incontinence  Musculoskeletal:  [ ] myalgias [ ] arthralgias [ ] arthritis  [ ] back pain  Neurological:  [ ] headache [ ] seizures  [ ] confusion/altered mental status  Psychiatric:  [ ] anxiety [ ] depression	  Hematology/Lymphatics:  [ ] lymphadenopathy  Endocrine:  [ ] adrenal [ ] thyroid  Allergic/Immunologic:	 [ ] transplant [ ] seasonal    Vital Signs Last 24 Hrs  T(F): 97.9 (10-13-22 @ 10:00), Max: 98.6 (10-08-22 @ 04:15)  Vital Signs Last 24 Hrs  HR: 69 (10-13-22 @ 10:00) (63 - 85)  BP: 106/58 (10-13-22 @ 10:00) (106/58 - 178/78)  RR: 16 (10-13-22 @ 10:00)  SpO2: 98% (10-13-22 @ 10:00) (94% - 100%)  Wt(kg): --    PHYSICAL EXAMINATION:  General: Alert and Awake, NAD  HEENT: PERRL, EOMI, No subconjunctival hemorrhages, Oropharynx Clear, MMM  Neck: Supple, No WALI  Cardiac: RRR, No M/R/G  Resp: CTAB, No Wh/Rh/Ra  Abdomen: NBS, NT/ND, No HSM, No rigidity or guarding  MSK: No LE edema. No stigmata of IE. No evidence of phlebitis. No evidence of synovitis.  : No topete  Skin: No rashes or lesions. Skin is warm and dry to the touch.   Neuro: Alert and Awake. CN 2-12 Grossly intact. Moves all four extremities spontaneously.  Psych: Calm, Pleasant, Cooperative                          9.3    12.03 )-----------( 152      ( 13 Oct 2022 03:12 )             30.5     10-13    135  |  96  |  23  ----------------------------<  141<H>  4.7   |  24  |  5.19<H>    Ca    8.1<L>      13 Oct 2022 03:12  Phos  4.4     10-13  Mg     2.3     10-13    TPro  6.9  /  Alb  3.7  /  TBili  1.4<H>  /  DBili  0.7<H>  /  AST  25  /  ALT  19  /  AlkPhos  74  10-13    MICROBIOLOGY:    Culture - Body Fluid with Gram Stain (collected 12 Oct 2022 21:59)  Source: Bile Bile Fluid  Gram Stain (13 Oct 2022 04:41):    polymorphonuclear leukocytes seen    Yeast like cells seen    Gram Negative Rods seen    by cytocentrifuge    RADIOLOGY:    <The imaging below has been reviewed and visualized by me independently. Findings as detailed in report below>     Patient is a 62y old  Female who presents with a chief complaint of Abdominal pain (13 Oct 2022 08:58)      HPI:  63 yo F hx of myelofibrosis, polycythemia vera, ESRD on MFW schedule via LUE AVF (last dialysis W), portal HTN, subclavian port for transfusions, gastric varices, gallstones, recent ERCP for choledocolithiasis s/p biliary stent, c/b post ERCP pancreatitis + pneumobilia now presenting with fever and RUQ and epigastric pain. States pain has been ongoing for past 2 weeks, and has been in ER 3 times over this time. [place] chills last night, noted to be febrile this morning. Nausea and few episodes of nbnb emesis this AM.    ED Course:  Febrile to 100.5. Normal HR and BP. Pt was given tylenol, zosyn, and blood cultures obtained. U/S abdomen showed no acute pathology, CBD stent with pneumobilia, marked splenomegaly with upper abdominal varices. CT CA/P with contrast showed the same. No imaging evidence of cirrhosis. General surgery with no intervention. Nephrology also consulted will dialyze as per schedule.    COVID19/FLU/RSV PCR (9/30) Negative  COVID19 PCR (10/7, 10/11) Negative  MRSA/MSSA PCR (10/2) Negative  BCx (9/30) NGTD    On 10/12/22 s/p open cholecystectomy   Bile Cultures (10/12) Gram negative rods and Yeast on gram stain    MRCP (10/4) Mildly distended gallbladder with nonspecific diffuse wall thickening and   air and Few scattered subcentimeter hepatic lesions are suspicious for microabscesses.    Antibiotic Course:  Zosyn: 9/30 --> 10/9, 10/12 -->      prior hospital charts reviewed [  ]  primary team notes reviewed [ x ]  other consultant notes reviewed [ x ]    PAST MEDICAL & SURGICAL HISTORY:  Polycythemia vera  and secondary myelofibrosis      Peptic ulcer disease      Hypertension      Splenomegaly  2015      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      Pancreatic cyst      History of myelofibrosis      History of myelofibrosis      Peritoneal dialysis catheter in place  Placed 8/9/2017      Hemoperitoneum as complication of peritoneal dialysis  s/p removal 9/18      AV fistula  Placed 9/18          Allergies  No Known Allergies    ANTIMICROBIALS (past 90 days)  MEDICATIONS  (STANDING):  piperacillin/tazobactam IVPB.   200 mL/Hr IV Intermittent (09-30-22 @ 11:27)    piperacillin/tazobactam IVPB.   200 mL/Hr IV Intermittent (10-12-22 @ 18:40)    piperacillin/tazobactam IVPB.-   200 mL/Hr IV Intermittent (10-12-22 @ 23:44)    piperacillin/tazobactam IVPB..   25 mL/Hr IV Intermittent (10-04-22 @ 12:28)   25 mL/Hr IV Intermittent (10-04-22 @ 01:18)   25 mL/Hr IV Intermittent (10-03-22 @ 12:20)   25 mL/Hr IV Intermittent (10-03-22 @ 00:20)   25 mL/Hr IV Intermittent (10-02-22 @ 14:14)   25 mL/Hr IV Intermittent (10-01-22 @ 23:37)   25 mL/Hr IV Intermittent (10-01-22 @ 12:16)   25 mL/Hr IV Intermittent (10-01-22 @ 00:07)    piperacillin/tazobactam IVPB..   25 mL/Hr IV Intermittent (10-09-22 @ 00:43)   25 mL/Hr IV Intermittent (10-08-22 @ 11:22)   25 mL/Hr IV Intermittent (10-08-22 @ 00:29)   25 mL/Hr IV Intermittent (10-07-22 @ 14:31)   25 mL/Hr IV Intermittent (10-07-22 @ 00:32)   25 mL/Hr IV Intermittent (10-06-22 @ 15:32)   25 mL/Hr IV Intermittent (10-06-22 @ 00:27)   25 mL/Hr IV Intermittent (10-05-22 @ 17:02)   25 mL/Hr IV Intermittent (10-05-22 @ 00:53)      ANTIMICROBIALS:    piperacillin/tazobactam IVPB.. 3.375 every 12 hours    OTHER MEDS: MEDICATIONS  (STANDING):  acetaminophen     Tablet .. 650 every 6 hours  carvedilol 3.125 every 12 hours  danazol 200 three times a day  epoetin filiberto-epbx (RETACRIT) Injectable 00816 <User Schedule>  heparin   Injectable 5000 every 8 hours  HYDROmorphone  Injectable 0.5 every 4 hours PRN  HYDROmorphone  Injectable 0.25 every 10 minutes PRN  oxyCODONE    IR 5 every 6 hours PRN    SOCIAL HISTORY:   no smoking or etoh use. no drug use.     FAMILY HISTORY:  Family history of hypertension in mother    Family history of malignant neoplasm (Grandparent)  head and neck in father    FH: cirrhosis (Sibling)      REVIEW OF SYSTEMS  [  ] ROS unobtainable because:    [  x] All other systems negative except as noted below:	    Constitutional:  [ ] fever [ ] chills  [ ] weight loss  [ ] weakness  Skin:  [ ] rash [ ] phlebitis	  Eyes: [ ] icterus [ ] pain  [ ] discharge	  ENMT: [ ] sore throat  [ ] thrush [ ] ulcers [ ] exudates  Respiratory: [ ] dyspnea [ ] hemoptysis [ ] cough [ ] sputum	  Cardiovascular:  [ ] chest pain [ ] palpitations [ ] edema	  Gastrointestinal:  [ ] nausea [ ] vomiting [ ] diarrhea [ ] constipation [ x] pain	  Genitourinary:  [ ] dysuria [ ] frequency [ ] hematuria [ ] discharge [ ] flank pain  [ ] incontinence  Musculoskeletal:  [ ] myalgias [ ] arthralgias [ ] arthritis  [ ] back pain  Neurological:  [ ] headache [ ] seizures  [ ] confusion/altered mental status  Psychiatric:  [ ] anxiety [ ] depression	  Hematology/Lymphatics:  [ ] lymphadenopathy  Endocrine:  [ ] adrenal [ ] thyroid  Allergic/Immunologic:	 [ ] transplant [ ] seasonal    Vital Signs Last 24 Hrs  T(F): 97.9 (10-13-22 @ 10:00), Max: 98.6 (10-08-22 @ 04:15)  Vital Signs Last 24 Hrs  HR: 69 (10-13-22 @ 10:00) (63 - 85)  BP: 106/58 (10-13-22 @ 10:00) (106/58 - 178/78)  RR: 16 (10-13-22 @ 10:00)  SpO2: 98% (10-13-22 @ 10:00) (94% - 100%)  Wt(kg): --    PHYSICAL EXAMINATION:  General: Alert and Awake, NAD  HEENT: PERRL, EOMI, No subconjunctival hemorrhages, Oropharynx Clear, MMM  Neck: Supple, No WALI  Cardiac: RRR, No M/R/G  Resp: CTAB, No Wh/Rh/Ra  Abdomen: Dressing over RUQ. Tenderness to palpation in RUQ or RLQ. NBS.   MSK: No LE edema. No stigmata of IE. No evidence of phlebitis. No evidence of synovitis.  : No topete  Skin: No rashes or lesions. Skin is warm and dry to the touch.   Neuro: Alert and Awake. CN 2-12 Grossly intact. Moves all four extremities spontaneously.  Psych: Calm, Pleasant, Cooperative                          9.3    12.03 )-----------( 152      ( 13 Oct 2022 03:12 )             30.5     10-13    135  |  96  |  23  ----------------------------<  141<H>  4.7   |  24  |  5.19<H>    Ca    8.1<L>      13 Oct 2022 03:12  Phos  4.4     10-13  Mg     2.3     10-13    TPro  6.9  /  Alb  3.7  /  TBili  1.4<H>  /  DBili  0.7<H>  /  AST  25  /  ALT  19  /  AlkPhos  74  10-13    MICROBIOLOGY:    Culture - Body Fluid with Gram Stain (collected 12 Oct 2022 21:59)  Source: Bile Bile Fluid  Gram Stain (13 Oct 2022 04:41):    polymorphonuclear leukocytes seen    Yeast like cells seen    Gram Negative Rods seen    by cytocentrifuge    RADIOLOGY:    <The imaging below has been reviewed and visualized by me independently. Findings as detailed in report below>    < from: Xray Chest 1 View- PORTABLE-Urgent (Xray Chest 1 View- PORTABLE-Urgent .) (10.11.22 @ 16:29) >    IMPRESSION:  Clear lungs.    < end of copied text >

## 2022-10-13 NOTE — PROVIDER CONTACT NOTE (CRITICAL VALUE NOTIFICATION) - TEST AND RESULT REPORTED:
body fluid culture: morphonuclear leukocytes yeast like cell gram (-) rods seen cytocoelles centrifuge

## 2022-10-13 NOTE — PHYSICAL THERAPY INITIAL EVALUATION ADULT - CRITERIA FOR SKILLED THERAPEUTIC INTERVENTIONS
impairments found/functional limitations in following categories/risk reduction/prevention/rehab potential/anticipated discharge recommendation 10

## 2022-10-13 NOTE — PROGRESS NOTE ADULT - ASSESSMENT
63 yo F hx of myelofibrosis, polycythemia vera, ESRD on MWf schedule via LUE AVF (last dialysis W), portal HTN, subclavian port for transfusions, gastric varices, gallstones, recent ERCP for choledocolithiasis s/p biliary stent, c/b post ERCP pancreatitis now presenting with fever and RUQ and epigastric pain, and fever.

## 2022-10-13 NOTE — PROGRESS NOTE ADULT - PROBLEM SELECTOR PLAN 3
Recently phos has been low. Please hold phoslo for now  Obtain phosphorus and calcium levels with BMPs    If you have any questions, please feel free to contact me  Keshawn Farfan  Nephrology Fellow  [phone]; Prefer [employer] TEAMS  (After 5pm or on weekends please page the on-call fellow).    Patient was discussed with Dr. Martinez who agrees with my A/P. Addendum to follow

## 2022-10-13 NOTE — PROGRESS NOTE ADULT - SUBJECTIVE AND OBJECTIVE BOX
[facility] Division of Kidney Diseases & Hypertension  FOLLOW UP NOTE  [phone]--------------------------------------------------------------------------------  Chief Complaint:Abdominal pain    Patient was seen on 10/11 and this note and it's findings represent findings and assessment of that day    24 hour events/subjective:  Called by primary team to do HD session this evening in prep for open cholecystectomy on 10/12.       PAST HISTORY  --------------------------------------------------------------------------------  No significant changes to PMH, PSH, FHx, SHx, unless otherwise noted    ALLERGIES & MEDICATIONS  --------------------------------------------------------------------------------  Allergies    No Known Allergies    Intolerances      Standing Inpatient Medications  acetaminophen     Tablet .. 650 milliGRAM(s) Oral every 6 hours  carvedilol 3.125 milliGRAM(s) Oral every 12 hours  chlorhexidine 2% Cloths 1 Application(s) Topical daily  danazol 200 milliGRAM(s) Oral three times a day  epoetin filiberto-epbx (RETACRIT) Injectable 91007 Unit(s) IV Push <User Schedule>  heparin   Injectable 5000 Unit(s) SubCutaneous every 8 hours  multivitamin 1 Tablet(s) Oral daily  Nephro-deonna 1 Tablet(s) Oral daily  piperacillin/tazobactam IVPB.. 3.375 Gram(s) IV Intermittent every 12 hours    PRN Inpatient Medications  HYDROmorphone  Injectable 0.5 milliGRAM(s) IV Push every 4 hours PRN  HYDROmorphone  Injectable 0.25 milliGRAM(s) IV Push every 10 minutes PRN  oxyCODONE    IR 5 milliGRAM(s) Oral every 6 hours PRN      REVIEW OF SYSTEMS  --------------------------------------------------------------------------------  Gen: No  fevers/chills  Skin: No rashes  Head/Eyes/Ears/Mouth: No headache; Normal hearing; Normal vision w/o blurriness  Respiratory: No dyspnea, cough, wheezing, hemoptysis  CV: No chest pain, PND, orthopnea  GI: + abdominal pain,- diarrhea, constipation, nausea, vomiting  : No increased frequency, dysuria, hematuria, nocturia  MSK: No joint pain/swelling; no back pain; no edema  Neuro: No dizziness/lightheadedness, weakness, seizures, numbness, tingling      All other systems were reviewed and are negative, except as noted.    VITALS/PHYSICAL EXAM  --------------------------------------------------------------------------------  T(C): 36.6 (10-13-22 @ 10:00), Max: 36.8 (10-13-22 @ 00:00)  HR: 69 (10-13-22 @ 10:00) (63 - 85)  BP: 106/58 (10-13-22 @ 10:00) (106/58 - 178/78)  RR: 16 (10-13-22 @ 10:00) (14 - 20)  SpO2: 98% (10-13-22 @ 10:00) (94% - 100%)  Wt(kg): --  Height (cm): 157.5 (10-12-22 @ 13:40)  Weight (kg): 59 (10-12-22 @ 13:40)  BMI (kg/m2): 23.8 (10-12-22 @ 13:40)  BSA (m2): 1.59 (10-12-22 @ 13:40)      10-12-22 @ 07:01  -  10-13-22 @ 07:00  --------------------------------------------------------  IN: 380 mL / OUT: 0 mL / NET: 380 mL    10-13-22 @ 07:01  -  10-13-22 @ 11:32  --------------------------------------------------------  IN: 150 mL / OUT: 0 mL / NET: 150 mL      Physical Exam:  	Gen: NAD, uncomfortable   	HEENT:  supple neck, clear oropharynx  	Pulm: CTA B/L  	CV: RRR, S1S2; no rub  	Back: No spinal or CVA tenderness; no sacral edema  	Abd: +BS, soft, epigastric tenderness   	: No suprapubic tenderness  	LE: Warm, FROM, no clubbing, intact strength; no edema  	Neuro: No focal deficits, intact gait  	Psych: Normal affect and mood  	Skin: Warm, without rashes  	Vascular access: LUE Fistula without aneurysmal dilatation    LABS/STUDIES  --------------------------------------------------------------------------------              9.3    12.03 >-----------<  152      [10-13-22 @ 03:12]              30.5     135  |  96  |  23  ----------------------------<  141      [10-13-22 @ 03:12]  4.7   |  24  |  5.19        Ca     8.1     [10-13-22 @ 03:12]      Mg     2.3     [10-13-22 @ 03:12]      Phos  4.4     [10-13-22 @ 03:12]    TPro  6.9  /  Alb  3.7  /  TBili  1.4  /  DBili  0.7  /  AST  25  /  ALT  19  /  AlkPhos  74  [10-13-22 @ 03:12]    PT/INR: PT 13.4 , INR 1.16       [10-12-22 @ 16:56]  PTT: 33.0       [10-12-22 @ 16:56]      Creatinine Trend:  SCr 5.19 [10-13 @ 03:12]  SCr 4.37 [10-12 @ 16:56]  SCr 3.69 [10-12 @ 05:37]  SCr 4.62 [10-11 @ 06:13]  SCr 6.89 [10-10 @ 06:29]

## 2022-10-13 NOTE — PROGRESS NOTE ADULT - PROBLEM SELECTOR PLAN 1
Will dialyze today per MWF schedule via LUE fistula    Due to surgery planned for yesturday, patient was dialyzed on 10/11 evening. Patient is not volume overloaded and labs w/o electrolyte derangements requiring HD. Will plan for HD tomorrow per her regular schedule

## 2022-10-13 NOTE — PHYSICAL THERAPY INITIAL EVALUATION ADULT - ADDITIONAL COMMENTS
Per pt, she lives in an apt with  and dtr with +elevator. Reports being independent with functional mobility/ADLs without AD. -drive, -work.

## 2022-10-13 NOTE — PROGRESS NOTE ADULT - SUBJECTIVE AND OBJECTIVE BOX
[facility] Division of Kidney Diseases & Hypertension  FOLLOW UP NOTE  [phone]--------------------------------------------------------------------------------  Chief Complaint:Abdominal pain        24 hour events/subjective:  Patient s/p open cholecystectomy       PAST HISTORY  --------------------------------------------------------------------------------  No significant changes to PMH, PSH, FHx, SHx, unless otherwise noted    ALLERGIES & MEDICATIONS  --------------------------------------------------------------------------------  Allergies    No Known Allergies    Intolerances      Standing Inpatient Medications  acetaminophen     Tablet .. 650 milliGRAM(s) Oral every 6 hours  carvedilol 3.125 milliGRAM(s) Oral every 12 hours  chlorhexidine 2% Cloths 1 Application(s) Topical daily  danazol 200 milliGRAM(s) Oral three times a day  epoetin filiberto-epbx (RETACRIT) Injectable 53236 Unit(s) IV Push <User Schedule>  heparin   Injectable 5000 Unit(s) SubCutaneous every 8 hours  multivitamin 1 Tablet(s) Oral daily  Nephro-deonna 1 Tablet(s) Oral daily  piperacillin/tazobactam IVPB.. 3.375 Gram(s) IV Intermittent every 12 hours    PRN Inpatient Medications  HYDROmorphone  Injectable 0.5 milliGRAM(s) IV Push every 4 hours PRN  HYDROmorphone  Injectable 0.25 milliGRAM(s) IV Push every 10 minutes PRN  oxyCODONE    IR 5 milliGRAM(s) Oral every 6 hours PRN      REVIEW OF SYSTEMS  --------------------------------------------------------------------------------  Gen: No  fevers/chills  Skin: No rashes  Head/Eyes/Ears/Mouth: No headache; Normal hearing; Normal vision w/o blurriness  Respiratory: No dyspnea, cough, wheezing, hemoptysis  CV: No chest pain, PND, orthopnea  GI: + abdominal pain,- diarrhea, constipation, nausea, vomiting  : No increased frequency, dysuria, hematuria, nocturia  MSK: No joint pain/swelling; no back pain; no edema  Neuro: No dizziness/lightheadedness, weakness, seizures, numbness, tingling    All other systems were reviewed and are negative, except as noted.    VITALS/PHYSICAL EXAM  --------------------------------------------------------------------------------  T(C): 36.7 (10-13-22 @ 08:00), Max: 36.8 (10-12-22 @ 11:30)  HR: 70 (10-13-22 @ 08:00) (63 - 85)  BP: 139/62 (10-13-22 @ 08:00) (123/59 - 178/78)  RR: 16 (10-13-22 @ 08:00) (14 - 18)  SpO2: 100% (10-13-22 @ 08:00) (94% - 100%)  Wt(kg): --  Height (cm): 157.5 (10-12-22 @ 13:40)  Weight (kg): 59 (10-12-22 @ 13:40)  BMI (kg/m2): 23.8 (10-12-22 @ 13:40)  BSA (m2): 1.59 (10-12-22 @ 13:40)      10-12-22 @ 07:01  -  10-13-22 @ 07:00  --------------------------------------------------------  IN: 380 mL / OUT: 0 mL / NET: 380 mL    10-13-22 @ 07:01  -  10-13-22 @ 08:59  --------------------------------------------------------  IN: 100 mL / OUT: 0 mL / NET: 100 mL      Physical Exam:  	Gen: NAD, uncomfortable   	HEENT:  supple neck, clear oropharynx  	Pulm: CTA B/L  	CV: RRR, S1S2; no rub  	Back: No spinal or CVA tenderness; no sacral edema  	Abd: +BS, soft, epigastric tenderness   	: No suprapubic tenderness  	LE: Warm, FROM, no clubbing, intact strength; no edema  	Neuro: No focal deficits, intact gait  	Psych: Normal affect and mood  	Skin: Warm, without rashes  	Vascular access: LUE Fistula without aneurysmal dilatation      LABS/STUDIES  --------------------------------------------------------------------------------              9.3    12.03 >-----------<  152      [10-13-22 @ 03:12]              30.5     135  |  96  |  23  ----------------------------<  141      [10-13-22 @ 03:12]  4.7   |  24  |  5.19        Ca     8.1     [10-13-22 @ 03:12]      Mg     2.3     [10-13-22 @ 03:12]      Phos  4.4     [10-13-22 @ 03:12]    TPro  6.9  /  Alb  3.7  /  TBili  1.4  /  DBili  0.7  /  AST  25  /  ALT  19  /  AlkPhos  74  [10-13-22 @ 03:12]    PT/INR: PT 13.4 , INR 1.16       [10-12-22 @ 16:56]  PTT: 33.0       [10-12-22 @ 16:56]      Creatinine Trend:  SCr 5.19 [10-13 @ 03:12]  SCr 4.37 [10-12 @ 16:56]  SCr 3.69 [10-12 @ 05:37]  SCr 4.62 [10-11 @ 06:13]  SCr 6.89 [10-10 @ 06:29]

## 2022-10-13 NOTE — PROGRESS NOTE ADULT - ATTENDING COMMENTS
63 yo F hx of myelofibrosis, polycythemia vera, ESRD on MWf schedule via LUE AVF (last dialysis W), portal HTN, subclavian port for transfusions, gastric varices, gallstones, recent ERCP for choledocolithiasis s/p biliary stent, c/b post ERCP pancreatitis now presenting with fever and RUQ and epigastric pain, and fever.  Seen 10/13 am in PACU.  +abdominal pain, alert, conversant  1.  ESRD--HD tomorrow and TIW.  Volume, electrolyte wise NO ABSOLUTE HD INDICATIONS TODAY  2.  Anemia--INDER titration goal hgb 10  3.  Secondary hyperparathyroidism--PO4 low, holdING  binder  discussed with PACU team  Jignesh Martinez MD  contact me on TEAMS

## 2022-10-13 NOTE — CONSULT NOTE ADULT - CONSULT REASON
R/o cholecystitis
esrd
eval for cholecystectomy
Cholecystitis
Percutaneous cholecystostomy
varices, surgery clearance
evaluate for stent removal
myelofibrosis

## 2022-10-13 NOTE — PROGRESS NOTE ADULT - PROBLEM SELECTOR PLAN 3
Recently phos has been low. Please hold phoslo for now  Obtain phosphorus and calcium levels with BMPs    If you have any questions, please feel free to contact me  Keshawn Farfan  Nephrology Fellow  [phone]; Prefer [employer] TEAMS  (After 5pm or on weekends please page the on-call fellow).    Patient was discussed with Dr. Arechiga who agrees with my A/P. Addendum to follow    Patient was seen on 10/11 and this note and it's findings represent findings and assessment of that day

## 2022-10-13 NOTE — CONSULT NOTE ADULT - CONSULT REQUESTED DATE/TIME
01-Oct-2022 11:47
02-Oct-2022 08:00
13-Oct-2022 10:50
30-Sep-2022 17:08
06-Oct-2022 11:12
13-Oct-2022
02-Oct-2022 07:09
30-Sep-2022 16:29

## 2022-10-14 LAB
-  AMIKACIN: SIGNIFICANT CHANGE UP
-  AMOXICILLIN/CLAVULANIC ACID: SIGNIFICANT CHANGE UP
-  AMPICILLIN/SULBACTAM: SIGNIFICANT CHANGE UP
-  AMPICILLIN: SIGNIFICANT CHANGE UP
-  AZTREONAM: SIGNIFICANT CHANGE UP
-  CEFAZOLIN: SIGNIFICANT CHANGE UP
-  CEFEPIME: SIGNIFICANT CHANGE UP
-  CEFOXITIN: SIGNIFICANT CHANGE UP
-  CEFTRIAXONE: SIGNIFICANT CHANGE UP
-  CIPROFLOXACIN: SIGNIFICANT CHANGE UP
-  ERTAPENEM: SIGNIFICANT CHANGE UP
-  GENTAMICIN: SIGNIFICANT CHANGE UP
-  IMIPENEM: SIGNIFICANT CHANGE UP
-  LEVOFLOXACIN: SIGNIFICANT CHANGE UP
-  MEROPENEM: SIGNIFICANT CHANGE UP
-  PIPERACILLIN/TAZOBACTAM: SIGNIFICANT CHANGE UP
-  TOBRAMYCIN: SIGNIFICANT CHANGE UP
-  TRIMETHOPRIM/SULFAMETHOXAZOLE: SIGNIFICANT CHANGE UP
ALBUMIN SERPL ELPH-MCNC: 3.9 G/DL — SIGNIFICANT CHANGE UP (ref 3.3–5)
ALP SERPL-CCNC: 75 U/L — SIGNIFICANT CHANGE UP (ref 40–120)
ALT FLD-CCNC: 15 U/L — SIGNIFICANT CHANGE UP (ref 10–45)
ANION GAP SERPL CALC-SCNC: 19 MMOL/L — HIGH (ref 5–17)
APTT BLD: 36.7 SEC — HIGH (ref 27.5–35.5)
AST SERPL-CCNC: 20 U/L — SIGNIFICANT CHANGE UP (ref 10–40)
BASOPHILS # BLD AUTO: 0.12 K/UL — SIGNIFICANT CHANGE UP (ref 0–0.2)
BASOPHILS NFR BLD AUTO: 1.2 % — SIGNIFICANT CHANGE UP (ref 0–2)
BILIRUB DIRECT SERPL-MCNC: 0.6 MG/DL — HIGH (ref 0–0.3)
BILIRUB INDIRECT FLD-MCNC: 0.7 MG/DL — SIGNIFICANT CHANGE UP (ref 0.2–1)
BILIRUB SERPL-MCNC: 1.3 MG/DL — HIGH (ref 0.2–1.2)
BUN SERPL-MCNC: 38 MG/DL — HIGH (ref 7–23)
CALCIUM SERPL-MCNC: 8.6 MG/DL — SIGNIFICANT CHANGE UP (ref 8.4–10.5)
CHLORIDE SERPL-SCNC: 96 MMOL/L — SIGNIFICANT CHANGE UP (ref 96–108)
CO2 SERPL-SCNC: 21 MMOL/L — LOW (ref 22–31)
CREAT SERPL-MCNC: 7.37 MG/DL — HIGH (ref 0.5–1.3)
EGFR: 6 ML/MIN/1.73M2 — LOW
EOSINOPHIL # BLD AUTO: 0.15 K/UL — SIGNIFICANT CHANGE UP (ref 0–0.5)
EOSINOPHIL NFR BLD AUTO: 1.5 % — SIGNIFICANT CHANGE UP (ref 0–6)
GLUCOSE SERPL-MCNC: 74 MG/DL — SIGNIFICANT CHANGE UP (ref 70–99)
HCT VFR BLD CALC: 28.5 % — LOW (ref 34.5–45)
HGB BLD-MCNC: 8.9 G/DL — LOW (ref 11.5–15.5)
IMM GRANULOCYTES NFR BLD AUTO: 2.9 % — HIGH (ref 0–0.9)
INR BLD: 1.44 RATIO — HIGH (ref 0.88–1.16)
LYMPHOCYTES # BLD AUTO: 0.87 K/UL — LOW (ref 1–3.3)
LYMPHOCYTES # BLD AUTO: 9 % — LOW (ref 13–44)
MAGNESIUM SERPL-MCNC: 2.8 MG/DL — HIGH (ref 1.6–2.6)
MCHC RBC-ENTMCNC: 29.9 PG — SIGNIFICANT CHANGE UP (ref 27–34)
MCHC RBC-ENTMCNC: 31.2 GM/DL — LOW (ref 32–36)
MCV RBC AUTO: 95.6 FL — SIGNIFICANT CHANGE UP (ref 80–100)
METHOD TYPE: SIGNIFICANT CHANGE UP
MONOCYTES # BLD AUTO: 0.54 K/UL — SIGNIFICANT CHANGE UP (ref 0–0.9)
MONOCYTES NFR BLD AUTO: 5.6 % — SIGNIFICANT CHANGE UP (ref 2–14)
NEUTROPHILS # BLD AUTO: 7.73 K/UL — HIGH (ref 1.8–7.4)
NEUTROPHILS NFR BLD AUTO: 79.8 % — HIGH (ref 43–77)
NRBC # BLD: 0 /100 WBCS — SIGNIFICANT CHANGE UP (ref 0–0)
PHOSPHATE SERPL-MCNC: 5.3 MG/DL — HIGH (ref 2.5–4.5)
PLATELET # BLD AUTO: 166 K/UL — SIGNIFICANT CHANGE UP (ref 150–400)
POTASSIUM SERPL-MCNC: 5.3 MMOL/L — SIGNIFICANT CHANGE UP (ref 3.5–5.3)
POTASSIUM SERPL-SCNC: 5.3 MMOL/L — SIGNIFICANT CHANGE UP (ref 3.5–5.3)
PROT SERPL-MCNC: 7 G/DL — SIGNIFICANT CHANGE UP (ref 6–8.3)
PROTHROM AB SERPL-ACNC: 16.6 SEC — HIGH (ref 10.5–13.4)
RBC # BLD: 2.98 M/UL — LOW (ref 3.8–5.2)
RBC # FLD: 19.4 % — HIGH (ref 10.3–14.5)
SODIUM SERPL-SCNC: 136 MMOL/L — SIGNIFICANT CHANGE UP (ref 135–145)
WBC # BLD: 9.69 K/UL — SIGNIFICANT CHANGE UP (ref 3.8–10.5)
WBC # FLD AUTO: 9.69 K/UL — SIGNIFICANT CHANGE UP (ref 3.8–10.5)

## 2022-10-14 PROCEDURE — 99232 SBSQ HOSP IP/OBS MODERATE 35: CPT | Mod: GC

## 2022-10-14 PROCEDURE — 99232 SBSQ HOSP IP/OBS MODERATE 35: CPT

## 2022-10-14 PROCEDURE — 74018 RADEX ABDOMEN 1 VIEW: CPT | Mod: 26

## 2022-10-14 RX ORDER — CYCLOBENZAPRINE HYDROCHLORIDE 10 MG/1
5 TABLET, FILM COATED ORAL THREE TIMES A DAY
Refills: 0 | Status: DISCONTINUED | OUTPATIENT
Start: 2022-10-14 | End: 2022-10-18

## 2022-10-14 RX ORDER — ONDANSETRON 8 MG/1
4 TABLET, FILM COATED ORAL EVERY 6 HOURS
Refills: 0 | Status: DISCONTINUED | OUTPATIENT
Start: 2022-10-14 | End: 2022-10-18

## 2022-10-14 RX ORDER — SENNA PLUS 8.6 MG/1
1 TABLET ORAL EVERY 12 HOURS
Refills: 0 | Status: DISCONTINUED | OUTPATIENT
Start: 2022-10-14 | End: 2022-10-18

## 2022-10-14 RX ORDER — GABAPENTIN 400 MG/1
100 CAPSULE ORAL DAILY
Refills: 0 | Status: DISCONTINUED | OUTPATIENT
Start: 2022-10-14 | End: 2022-10-18

## 2022-10-14 RX ORDER — POLYETHYLENE GLYCOL 3350 17 G/17G
17 POWDER, FOR SOLUTION ORAL EVERY 12 HOURS
Refills: 0 | Status: DISCONTINUED | OUTPATIENT
Start: 2022-10-14 | End: 2022-10-18

## 2022-10-14 RX ORDER — RUXOLITINIB 25 MG/1
10 TABLET ORAL
Refills: 0 | Status: DISCONTINUED | OUTPATIENT
Start: 2022-10-14 | End: 2022-10-18

## 2022-10-14 RX ADMIN — HYDROMORPHONE HYDROCHLORIDE 0.5 MILLIGRAM(S): 2 INJECTION INTRAMUSCULAR; INTRAVENOUS; SUBCUTANEOUS at 05:39

## 2022-10-14 RX ADMIN — CARVEDILOL PHOSPHATE 3.12 MILLIGRAM(S): 80 CAPSULE, EXTENDED RELEASE ORAL at 17:16

## 2022-10-14 RX ADMIN — PIPERACILLIN AND TAZOBACTAM 200 GRAM(S): 4; .5 INJECTION, POWDER, LYOPHILIZED, FOR SOLUTION INTRAVENOUS at 00:30

## 2022-10-14 RX ADMIN — GABAPENTIN 100 MILLIGRAM(S): 400 CAPSULE ORAL at 11:40

## 2022-10-14 RX ADMIN — OXYCODONE HYDROCHLORIDE 5 MILLIGRAM(S): 5 TABLET ORAL at 12:28

## 2022-10-14 RX ADMIN — DANAZOL 200 MILLIGRAM(S): 200 CAPSULE ORAL at 16:53

## 2022-10-14 RX ADMIN — DANAZOL 200 MILLIGRAM(S): 200 CAPSULE ORAL at 21:51

## 2022-10-14 RX ADMIN — CASPOFUNGIN ACETATE 260 MILLIGRAM(S): 7 INJECTION, POWDER, LYOPHILIZED, FOR SOLUTION INTRAVENOUS at 11:35

## 2022-10-14 RX ADMIN — HYDROMORPHONE HYDROCHLORIDE 0.5 MILLIGRAM(S): 2 INJECTION INTRAMUSCULAR; INTRAVENOUS; SUBCUTANEOUS at 01:28

## 2022-10-14 RX ADMIN — Medication 1 TABLET(S): at 11:35

## 2022-10-14 RX ADMIN — Medication 1 TABLET(S): at 11:36

## 2022-10-14 RX ADMIN — HEPARIN SODIUM 5000 UNIT(S): 5000 INJECTION INTRAVENOUS; SUBCUTANEOUS at 05:31

## 2022-10-14 RX ADMIN — RUXOLITINIB 10 MILLIGRAM(S): 25 TABLET ORAL at 21:52

## 2022-10-14 RX ADMIN — CHLORHEXIDINE GLUCONATE 1 APPLICATION(S): 213 SOLUTION TOPICAL at 11:47

## 2022-10-14 RX ADMIN — SENNA PLUS 1 TABLET(S): 8.6 TABLET ORAL at 21:51

## 2022-10-14 RX ADMIN — HYDROMORPHONE HYDROCHLORIDE 0.5 MILLIGRAM(S): 2 INJECTION INTRAMUSCULAR; INTRAVENOUS; SUBCUTANEOUS at 01:58

## 2022-10-14 RX ADMIN — HEPARIN SODIUM 5000 UNIT(S): 5000 INJECTION INTRAVENOUS; SUBCUTANEOUS at 16:57

## 2022-10-14 RX ADMIN — HYDROMORPHONE HYDROCHLORIDE 0.5 MILLIGRAM(S): 2 INJECTION INTRAMUSCULAR; INTRAVENOUS; SUBCUTANEOUS at 10:00

## 2022-10-14 RX ADMIN — HYDROMORPHONE HYDROCHLORIDE 0.5 MILLIGRAM(S): 2 INJECTION INTRAMUSCULAR; INTRAVENOUS; SUBCUTANEOUS at 09:36

## 2022-10-14 RX ADMIN — Medication 650 MILLIGRAM(S): at 16:54

## 2022-10-14 RX ADMIN — HEPARIN SODIUM 5000 UNIT(S): 5000 INJECTION INTRAVENOUS; SUBCUTANEOUS at 21:51

## 2022-10-14 RX ADMIN — CARVEDILOL PHOSPHATE 3.12 MILLIGRAM(S): 80 CAPSULE, EXTENDED RELEASE ORAL at 05:32

## 2022-10-14 RX ADMIN — Medication 650 MILLIGRAM(S): at 21:51

## 2022-10-14 RX ADMIN — Medication 650 MILLIGRAM(S): at 06:02

## 2022-10-14 RX ADMIN — POLYETHYLENE GLYCOL 3350 17 GRAM(S): 17 POWDER, FOR SOLUTION ORAL at 10:21

## 2022-10-14 RX ADMIN — DANAZOL 200 MILLIGRAM(S): 200 CAPSULE ORAL at 05:32

## 2022-10-14 RX ADMIN — OXYCODONE HYDROCHLORIDE 5 MILLIGRAM(S): 5 TABLET ORAL at 22:21

## 2022-10-14 RX ADMIN — Medication 650 MILLIGRAM(S): at 05:33

## 2022-10-14 RX ADMIN — ERYTHROPOIETIN 20000 UNIT(S): 10000 INJECTION, SOLUTION INTRAVENOUS; SUBCUTANEOUS at 14:38

## 2022-10-14 RX ADMIN — SENNA PLUS 1 TABLET(S): 8.6 TABLET ORAL at 10:20

## 2022-10-14 RX ADMIN — PIPERACILLIN AND TAZOBACTAM 200 GRAM(S): 4; .5 INJECTION, POWDER, LYOPHILIZED, FOR SOLUTION INTRAVENOUS at 11:33

## 2022-10-14 RX ADMIN — OXYCODONE HYDROCHLORIDE 5 MILLIGRAM(S): 5 TABLET ORAL at 21:52

## 2022-10-14 NOTE — PROGRESS NOTE ADULT - ASSESSMENT
62 year old female PMH  myelofibrosis, polycythemia vera, ESRD on MFW schedule via LUE AVF (last dialysis W), portal HTN, subclavian port for transfusions, gastric varices, gallstones, recent ERCP for choledocolithiasis s/p biliary stent, c/b post ERCP pancreatitis + pneumobilia who presented with fever and abdominal pain.     COVID19/FLU/RSV PCR (9/30) Negative  COVID19 PCR (10/7, 10/11) Negative  MRSA/MSSA PCR (10/2) Negative  BCx (9/30) NGTD    MRCP (10/4) Mildly distended gallbladder with nonspecific diffuse wall thickening and   air and Few scattered subcentimeter hepatic lesions are suspicious for microabscesses.    On 10/12/22 s/p open cholecystectomy   Bile Cultures (10/12) Klebsiella and Candida tropicalis    Antibiotic Course:  Zosyn: 9/30 --> 10/9, 10/12 -->     #Suppurative Cholecystitis, Fever, Positive Culture Finding (Candida tropicalis and Klebsiella on bile culture)  Given microabscess finding on MRCP would complete total 4 weeks of antibiotics / antifungals.   If Klebsiella is susceptible we can count Zosyn course toward total duration of therapy.   Hopefully we can complete course with PO antibiotics (depending on susceptibilities)  --Can switch Caspofungin to Fluconazole 200 mg PO Q24H  --Continue Zosyn 3.375 mg IV Q8H (pending Klebsiella susceptibilities)   --Continue to follow CBC with diff  --Continue to follow transaminases  --Continue to follow temperature curve  --Follow up on preliminary operative cultures    I will be away over this upcoming weekend. Please contact the Infectious Diseases Office with any further questions or concerns.     Juancho Kelly M.D.  [facility] Division of Infectious Disease  8AM-5PM Monday - Friday: Available on [employer] Teams  After Hours and Holidays (or if no response on [employer] Teams): Please contact the Infectious Diseases Office at [phone]     The above assessment and plan were discussed with transplant surgery team

## 2022-10-14 NOTE — PROGRESS NOTE ADULT - SUBJECTIVE AND OBJECTIVE BOX
Interval Events:   No acute events overnight.  Afebrile and hemodynamically stable.    ROS:   12 point review of systems performed and negative except otherwise noted in HPI.    Hospital Medications:  acetaminophen     Tablet .. 650 milliGRAM(s) Oral every 6 hours  carvedilol 3.125 milliGRAM(s) Oral every 12 hours  caspofungin IVPB 50 milliGRAM(s) IV Intermittent every 24 hours  caspofungin IVPB      chlorhexidine 2% Cloths 1 Application(s) Topical daily  danazol 200 milliGRAM(s) Oral three times a day  epoetin filiberto-epbx (RETACRIT) Injectable 23229 Unit(s) IV Push <User Schedule>  heparin   Injectable 5000 Unit(s) SubCutaneous every 8 hours  HYDROmorphone  Injectable 0.5 milliGRAM(s) IV Push every 4 hours PRN  multivitamin 1 Tablet(s) Oral daily  Nephro-deonna 1 Tablet(s) Oral daily  oxyCODONE    IR 5 milliGRAM(s) Oral every 6 hours PRN  piperacillin/tazobactam IVPB.. 3.375 Gram(s) IV Intermittent every 12 hours      PHYSICAL EXAM:   Vital Signs:  Vital Signs Last 24 Hrs  T(C): 36.4 (14 Oct 2022 05:19), Max: 37 (14 Oct 2022 01:18)  T(F): 97.6 (14 Oct 2022 05:19), Max: 98.6 (14 Oct 2022 01:18)  HR: 76 (14 Oct 2022 05:19) (66 - 79)  BP: 120/72 (14 Oct 2022 05:19) (106/58 - 134/68)  BP(mean): 81 (13 Oct 2022 12:00) (78 - 81)  RR: 18 (14 Oct 2022 05:19) (16 - 20)  SpO2: 94% (14 Oct 2022 05:19) (94% - 100%)    Parameters below as of 14 Oct 2022 05:19  Patient On (Oxygen Delivery Method): room air      Daily     Daily     GENERAL: no acute distress  NEURO: alert  HEENT: NCAT, no conjunctival pallor appreciated  CHEST: no respiratory distress, no accessory muscle use  CARDIAC: regular rate, +S1/S2  ABDOMEN: soft, post op ruq tenderness, no rebound or guarding  EXTREMITIES: warm, well perfused  SKIN: no lesions noted    LABS: reviewed                        8.9    9.69  )-----------( 166      ( 14 Oct 2022 07:36 )             28.5     10-14    136  |  96  |  38<H>  ----------------------------<  74  5.3   |  21<L>  |  7.37<H>    Ca    8.6      14 Oct 2022 07:36  Phos  5.3     10-14  Mg     2.8     10-14    TPro  7.0  /  Alb  3.9  /  TBili  1.3<H>  /  DBili  0.6<H>  /  AST  20  /  ALT  15  /  AlkPhos  75  10-14    LIVER FUNCTIONS - ( 14 Oct 2022 07:36 )  Alb: 3.9 g/dL / Pro: 7.0 g/dL / ALK PHOS: 75 U/L / ALT: 15 U/L / AST: 20 U/L / GGT: x             Interval Diagnostic Studies: see sunrise for full report

## 2022-10-14 NOTE — PROGRESS NOTE ADULT - ASSESSMENT
62 Faroese speaking F admitted with relevant PMH PV (JAK2+), ESRD on HD, HTN, noncirrhotic portal HTN 2/2 Nodular Regenerative Hyperplasia complicated by gastric varices, choledocholithiasis s/p ERCP 9/22 with stent and stone removal, c/b post ERCP pancreatitis re-admitted for worsening abdominal pain, now s/p open cholecystectomy (POD#0).    s/p open cholecystectomy (POD#1)  - OR bile fluid growing GNR + yeast like cells  - ID consulted: Continue Zosyn. Start Caspo       - will need OP recommendations as patient has no insurance for IV Abx       - f/u sensitivities  -ADAT  -SQH/SCDs/Spirometry  - PRN pain medications  - Will plan for ERCP w/ stent removal this admission      - VANI team on board: will f/u regarding removal this admission  - PT consult: patient is ambulatory    post operative ileus  - f/u AM KUB  - bowel reg: senna/miralax  - will d/c IV dilaudid and start multimodal pain control    ESRD  - HD today as per Nephrology    DISPO  -transfer to [facility] telemetry once bed available

## 2022-10-14 NOTE — PROGRESS NOTE ADULT - ASSESSMENT
62 year old female with history of myelofibrosis, polycythemia vera, ESRD on MWF schedule via LUE AVF (last dialysis W), portal HTN, subclavian port for transfusions, gastric varices, gallstones, ERCP 9/22/2022 for sludge and stones s/p metal biliary stent c/b post ERCP pancreatitis who presents with fever and RUQ and epigastric pain.     # Acute cholecystitis s/p CCY on 10/12/2022  # History of biliary sludge and stones s/p 10mm x 4cm covered metal stent in CBD placed 9/22/2022  # IGV1    Recommendations:  -trend clinical symptoms, exam findings, vital signs, CBC, CMP, INR  -no emergent indication for stent removal  -metal stent good for 6 months and just recently placed 23 days ago, will plan for outpatient stent removal    Note incomplete until finalized by attending signature/attestation.    Neymar Adams  GI/Hepatology Fellow    MONDAY-FRIDAY 8AM-5PM:  Pager# [phone] ([facility]) or [phone] ([facility])    NON-URGENT CONSULTS:  Please email [email or web address] OR [email or web address]  AT NIGHT AND ON WEEKENDS:  Contact on-call GI fellow via answering service ([phone]) from 5pm-8am and on weekends/holidays 62 year old female with history of myelofibrosis, polycythemia vera, ESRD on MWF schedule via LUE AVF (last dialysis W), portal HTN, subclavian port for transfusions, gastric varices, gallstones, ERCP 9/22/2022 for sludge and stones s/p metal biliary stent c/b post ERCP pancreatitis who presents with fever and RUQ and epigastric pain.     # Acute cholecystitis s/p CCY on 10/12/2022  # History of biliary sludge and stones s/p 10mm x 4cm covered metal stent in CBD placed 9/22/2022  # IGV1    Recommendations:  -trend clinical symptoms, exam findings, vital signs, CBC, CMP, INR  -no emergent indication for stent removal  -metal stent good for 6 months and just recently placed 23 days ago, acceptable to plan for outpatient stent removal  -if patient remains in house, can tentatively plan for ERCP for stent removal on Monday pending clinical course and schedule availability    Note incomplete until finalized by attending signature/attestation.    Neymar Adams  GI/Hepatology Fellow    MONDAY-FRIDAY 8AM-5PM:  Pager# [phone] ([facility]) or [phone] ([facility])    NON-URGENT CONSULTS:  Please email [email or web address]  AT NIGHT AND ON WEEKENDS:  Contact on-call GI fellow via answering service ([phone]) from 5pm-8am and on weekends/holidays

## 2022-10-14 NOTE — PROGRESS NOTE ADULT - SUBJECTIVE AND OBJECTIVE BOX
Transplant Surgery -  --------------------------------------------------------------  open cholecystectomy   DATE 10/12/22   POD #2    Present:   Patient seen and examined with Dr. Dagher, Resident Dr. Hernandez and RNs in AM rounds.   Disciplines not in attendance will be notified of the plan.     HPI: 62 Moh[place] speaking F admitted with relevant PMH PV (JAK2+), ESRD on HD, HTN, noncirrhotic portal HTN 2/2 Nodular Regenerative Hyperplasia complicated by gastric varices, choledocholithiasis s/p ERCP 9/22 with stent, c/b post ERCP pancreatitis readmitted to hospital with persistent epigastric abdominal pain on exam.     10/12/22 OR s/p open cholecystectomy via Kocher incision. Found to have purulent cholecystitis.    Interval Events:  - OR Bile cultures growing GNR and few yeast like cells started on caspo and continues with zosyn  - No acute events overnight    Potential Discharge date: Monday 10/17    Education:  Medications    Plan of care:  See Below      MEDICATIONS  (STANDING):  acetaminophen     Tablet .. 650 milliGRAM(s) Oral every 6 hours  carvedilol 3.125 milliGRAM(s) Oral every 12 hours  caspofungin IVPB      chlorhexidine 2% Cloths 1 Application(s) Topical daily  danazol 200 milliGRAM(s) Oral three times a day  epoetin filiberto-epbx (RETACRIT) Injectable 75243 Unit(s) IV Push <User Schedule>  heparin   Injectable 5000 Unit(s) SubCutaneous every 8 hours  multivitamin 1 Tablet(s) Oral daily  Nephro-deonna 1 Tablet(s) Oral daily  piperacillin/tazobactam IVPB.. 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  Pancreatic cyst  History of myelofibrosis  History of myelofibrosis  Peritoneal dialysis catheter in place  Placed 8/9/2017  Hemoperitoneum as complication of peritoneal dialysis  s/p removal 9/18  AV fistula  Placed 9/18    Vital Signs Last 24 Hrs  T(C): 36.7 (14 Oct 2022 09:57), Max: 37 (14 Oct 2022 01:18)  T(F): 98.1 (14 Oct 2022 09:57), Max: 98.6 (14 Oct 2022 01:18)  HR: 74 (14 Oct 2022 09:57) (66 - 79)  BP: 153/70 (14 Oct 2022 09:57) (108/67 - 153/70)  BP(mean): 81 (13 Oct 2022 12:00) (81 - 81)  RR: 18 (14 Oct 2022 09:57) (16 - 20)  SpO2: 94% (14 Oct 2022 09:57) (94% - 100%)    O2 Parameters below as of 14 Oct 2022 09:57  Patient On (Oxygen Delivery Method): room air    10-13 @ 07:01  -  10-14 @ 07:00  --------------------------------------------------------  IN: 755 mL / OUT: 300 mL / NET: 455 mL    Labs:  CAPILLARY BLOOD GLUCOSE                        8.9    9.69  )-----------( 166      ( 14 Oct 2022 07:36 )             28.5       Auto Neutrophil %: 79.8 % (10-14-22 @ 07:36)  Auto Immature Granulocyte %: 2.9 % (10-14-22 @ 07:36)    10-14    136  |  96  |  38<H>  ----------------------------<  74  5.3   |  21<L>  |  7.37<H>  Calcium, Total Serum: 8.6 mg/dL (10-14-22 @ 07:36)    LFTs:        7.0  | 1.3  | 20       ------------------[75      ( 14 Oct 2022 07:36 )  3.9  | 0.6  | 15          Lipase:x      Amylase:x         Blood Gas Arterial, Lactate: 0.6 mmol/L (10-12-22 @ 14:41)    ABG - ( 12 Oct 2022 14:41 )  pH: 7.49  /  pCO2: 36    /  pO2: 368   / HCO3: 27    / Base Excess: 3.9   /  SaO2: 99.0      Coags:  16.6   ----< 1.44    ( 14 Oct 2022 07:36 )     36.7     Culture - Fungal, Body Fluid (collected 12 Oct 2022 21:59)  Source: Bile Bile Fluid  Preliminary Report (13 Oct 2022 11:08):    Testing in progress    Culture - Body Fluid with Gram Stain (collected 12 Oct 2022 21:59)  Source: Bile Bile Fluid  Gram Stain (13 Oct 2022 04:41):    polymorphonuclear leukocytes seen    Yeast like cells seen    Gram Negative Rods seen    by cytocentrifuge  Preliminary Report (13 Oct 2022 21:43):    Moderate Klebsiella pneumoniae    Few Gloria tropicalis "Susceptibilities not performed"    Review of systems  Gen: No weight changes, fatigue, fevers/chills, weakness  Skin: No rashes  Head/Eyes/Ears/Mouth: No headache; Normal hearing; Normal vision w/o blurriness; No sinus pain/discomfort, sore throat  Respiratory: No dyspnea, cough, wheezing, hemoptysis  CV: No chest pain, PND, orthopnea  GI: C/O mild abdominal pain at surgical site. no diarrhea, constipation, nausea, vomiting, melena, hematochezia  : No increased frequency, dysuria, hematuria, nocturia  MSK: No joint pain/swelling; no back pain; no edema  Neuro: No dizziness/lightheadedness, weakness, seizures, numbness, tingling  Heme: No easy bruising or bleeding  Endo: No heat/cold intolerance  Psych: No significant nervousness, anxiety, stress, depression  All other systems were reviewed and are negative, except as noted.    PHYSICAL EXAM:  Constitutional: Well developed / well nourished  Eyes: Anicteric, PERRLA  ENMT: nc/at, no thrush  Neck: supple  Respiratory: CTA B/L  Cardiovascular: RRR  Gastrointestinal: Soft abdomen, ND, appropriate incisional TTP. Abdominal dressing intact w/o drainage  Genitourinary: Voiding spontaneously  Extremities: SCD's in place and working bilaterally  Vascular: Palpable dp pulses bilaterally  Neurological: A&O x3  Skin: no rashes, ulcerations, lesions  Musculoskeletal: Moving all extremities  Psychiatric: Responsive

## 2022-10-14 NOTE — PROGRESS NOTE ADULT - ATTENDING COMMENTS
As above    Impression:    #1.  Patient with prior ERCP for choledocholithiasis, which resulted in stone removal but bleeding which was treated with fully covered metal biliary stent    #2.  Patient with cholelithiasis s/p open cholecystectomy earlier this admission    #3.  Nodular liver hyperplasia with portal hypertension without cirrhosis.    Recommendations:    #1.  ERCP for biliary stent removal and clearance of bile duct planned for Monday (inpatient procedure at request of primary team and patient).    #2.  NPO after MN Sunday, may have meds with sips of water.

## 2022-10-14 NOTE — PROGRESS NOTE ADULT - SUBJECTIVE AND OBJECTIVE BOX
[facility] Division of Kidney Diseases & Hypertension  FOLLOW UP NOTE  [phone]--------------------------------------------------------------------------------  Chief Complaint:Abdominal pain        24 hour events/subjective:  Plan for HD today. Patient on oral liquid only, still has pain at site of surgery.     PAST HISTORY  --------------------------------------------------------------------------------  No significant changes to PMH, PSH, FHx, SHx, unless otherwise noted    ALLERGIES & MEDICATIONS  --------------------------------------------------------------------------------  Allergies    No Known Allergies    Intolerances      Standing Inpatient Medications  acetaminophen     Tablet .. 650 milliGRAM(s) Oral every 6 hours  carvedilol 3.125 milliGRAM(s) Oral every 12 hours  caspofungin IVPB 50 milliGRAM(s) IV Intermittent every 24 hours  caspofungin IVPB      chlorhexidine 2% Cloths 1 Application(s) Topical daily  danazol 200 milliGRAM(s) Oral three times a day  epoetin filiberto-epbx (RETACRIT) Injectable 62683 Unit(s) IV Push <User Schedule>  gabapentin 100 milliGRAM(s) Oral daily  heparin   Injectable 5000 Unit(s) SubCutaneous every 8 hours  multivitamin 1 Tablet(s) Oral daily  Nephro-deonna 1 Tablet(s) Oral daily  piperacillin/tazobactam IVPB.. 3.375 Gram(s) IV Intermittent every 12 hours  polyethylene glycol 3350 17 Gram(s) Oral every 12 hours  senna 1 Tablet(s) Oral every 12 hours    PRN Inpatient Medications  cyclobenzaprine 5 milliGRAM(s) Oral three times a day PRN  ondansetron Injectable 4 milliGRAM(s) IV Push every 6 hours PRN  oxyCODONE    IR 5 milliGRAM(s) Oral every 6 hours PRN      REVIEW OF SYSTEMS  --------------------------------------------------------------------------------  Gen: No  fevers/chills  Skin: No rashes  Head/Eyes/Ears/Mouth: No headache; Normal hearing; Normal vision w/o blurriness  Respiratory: No dyspnea, cough, wheezing, hemoptysis  CV: No chest pain, PND, orthopnea  GI: + abdominal pain,- diarrhea, constipation, nausea, vomiting  : No increased frequency, dysuria, hematuria, nocturia  MSK: No joint pain/swelling; no back pain; no edema  Neuro: No dizziness/lightheadedness, weakness, seizures, numbness, tingling    All other systems were reviewed and are negative, except as noted.    VITALS/PHYSICAL EXAM  --------------------------------------------------------------------------------  T(C): 36.7 (10-14-22 @ 09:57), Max: 37 (10-14-22 @ 01:18)  HR: 74 (10-14-22 @ 09:57) (66 - 79)  BP: 153/70 (10-14-22 @ 09:57) (108/67 - 153/70)  RR: 18 (10-14-22 @ 09:57) (16 - 20)  SpO2: 94% (10-14-22 @ 09:57) (94% - 100%)  Wt(kg): --  Height (cm): 157.5 (10-12-22 @ 13:40)  Weight (kg): 59 (10-12-22 @ 13:40)  BMI (kg/m2): 23.8 (10-12-22 @ 13:40)  BSA (m2): 1.59 (10-12-22 @ 13:40)      10-13-22 @ 07:01  -  10-14-22 @ 07:00  --------------------------------------------------------  IN: 755 mL / OUT: 300 mL / NET: 455 mL    10-14-22 @ 07:01  -  10-14-22 @ 11:20  --------------------------------------------------------  IN: 180 mL / OUT: 0 mL / NET: 180 mL      Physical Exam:  Gen: NAD, uncomfortable   	HEENT:  supple neck, clear oropharynx  	Pulm: CTA B/L  	CV: RRR, S1S2; no rub  	Back: No spinal or CVA tenderness; no sacral edema  	Abd: +BS, soft, epigastric tenderness  has a bandage in RUQ  	: No suprapubic tenderness  	LE: Warm, FROM, no clubbing, intact strength; no edema  	Neuro: No focal deficits, intact gait  	Psych: Normal affect and mood  	Skin: Warm, without rashes  	Vascular access: LUE Fistula without aneurysmal dilatation    LABS/STUDIES  --------------------------------------------------------------------------------              8.9    9.69  >-----------<  166      [10-14-22 @ 07:36]              28.5     136  |  96  |  38  ----------------------------<  74      [10-14-22 @ 07:36]  5.3   |  21  |  7.37        Ca     8.6     [10-14-22 @ 07:36]      Mg     2.8     [10-14-22 @ 07:36]      Phos  5.3     [10-14-22 @ 07:36]    TPro  7.0  /  Alb  3.9  /  TBili  1.3  /  DBili  0.6  /  AST  20  /  ALT  15  /  AlkPhos  75  [10-14-22 @ 07:36]    PT/INR: PT 16.6 , INR 1.44       [10-14-22 @ 07:36]  PTT: 36.7       [10-14-22 @ 07:36]      Creatinine Trend:  SCr 7.37 [10-14 @ 07:36]  SCr 5.19 [10-13 @ 03:12]  SCr 4.37 [10-12 @ 16:56]  SCr 3.69 [10-12 @ 05:37]  SCr 4.62 [10-11 @ 06:13]

## 2022-10-14 NOTE — PROGRESS NOTE ADULT - PROBLEM SELECTOR PLAN 3
Recently phos has been low. Please hold phoslo for now  Obtain phosphorus and calcium levels with BMPs    If you have any questions, please feel free to contact me  Keshawn Farfan  Nephrology Fellow  [phone]; Prefer [employer] TEAMS  (After 5pm or on weekends please page the on-call fellow).    Patient was discussed with Dr. Martinez who agrees with my A/P. Addendum to follow

## 2022-10-14 NOTE — PROGRESS NOTE ADULT - SUBJECTIVE AND OBJECTIVE BOX
Follow Up:      Interval History:    REVIEW OF SYSTEMS  [  ] ROS unobtainable because:    [  ] All other systems negative except as noted below    Constitutional:  [ ] fever [ ] chills  [ ] weight loss  [ ] weakness  Skin:  [ ] rash [ ] phlebitis	  Eyes: [ ] icterus [ ] pain  [ ] discharge	  ENMT: [ ] sore throat  [ ] thrush [ ] ulcers [ ] exudates  Respiratory: [ ] dyspnea [ ] hemoptysis [ ] cough [ ] sputum	  Cardiovascular:  [ ] chest pain [ ] palpitations [ ] edema	  Gastrointestinal:  [ ] nausea [ ] vomiting [ ] diarrhea [ ] constipation [ ] pain	  Genitourinary:  [ ] dysuria [ ] frequency [ ] hematuria [ ] discharge [ ] flank pain  [ ] incontinence  Musculoskeletal:  [ ] myalgias [ ] arthralgias [ ] arthritis  [ ] back pain  Neurological:  [ ] headache [ ] seizures  [ ] confusion/altered mental status    Allergies  No Known Allergies        ANTIMICROBIALS:  caspofungin IVPB 50 every 24 hours  caspofungin IVPB    piperacillin/tazobactam IVPB.. 3.375 every 12 hours      OTHER MEDS:  MEDICATIONS  (STANDING):  acetaminophen     Tablet .. 650 every 6 hours  carvedilol 3.125 every 12 hours  cyclobenzaprine 5 three times a day PRN  danazol 200 three times a day  epoetin filiberto-epbx (RETACRIT) Injectable 98385 <User Schedule>  gabapentin 100 daily  heparin   Injectable 5000 every 8 hours  ondansetron Injectable 4 every 6 hours PRN  oxyCODONE    IR 5 every 6 hours PRN  polyethylene glycol 3350 17 every 12 hours  ruxolitinib 10 <User Schedule>  senna 1 every 12 hours      Vital Signs Last 24 Hrs  T(C): 36.4 (14 Oct 2022 13:20), Max: 37 (14 Oct 2022 01:18)  T(F): 97.5 (14 Oct 2022 13:20), Max: 98.6 (14 Oct 2022 01:18)  HR: 78 (14 Oct 2022 13:20) (66 - 79)  BP: 151/79 (14 Oct 2022 13:20) (108/67 - 153/70)  BP(mean): --  RR: 18 (14 Oct 2022 13:20) (16 - 20)  SpO2: 97% (14 Oct 2022 13:20) (94% - 100%)    Parameters below as of 14 Oct 2022 13:20  Patient On (Oxygen Delivery Method): room air        PHYSICAL EXAMINATION:  General: Alert and Awake, NAD  HEENT: PERRL, EOMI  Neck: Supple  Cardiac: RRR, No M/R/G  Resp: CTAB, No Wh/Rh/Ra  Abdomen: NBS, NT/ND, No HSM, No rigidity or guarding  MSK: No LE edema. No Calf tenderness  : No topete  Skin: No rashes or lesions. Skin is warm and dry to the touch.   Neuro: Alert and Awake. CN 2-12 Grossly intact. Moves all four extremities spontaneously.  Psych: Calm, Pleasant, Cooperative                          8.9    9.69  )-----------( 166      ( 14 Oct 2022 07:36 )             28.5       10-14    136  |  96  |  38<H>  ----------------------------<  74  5.3   |  21<L>  |  7.37<H>    Ca    8.6      14 Oct 2022 07:36  Phos  5.3     10-14  Mg     2.8     10-14    TPro  7.0  /  Alb  3.9  /  TBili  1.3<H>  /  DBili  0.6<H>  /  AST  20  /  ALT  15  /  AlkPhos  75  10-14          MICROBIOLOGY:  v  Bile Bile Fluid  10-12-22   Moderate Klebsiella pneumoniae  Few Gloria tropicalis "Susceptibilities not performed"  --    polymorphonuclear leukocytes seen  Yeast like cells seen  Gram Negative Rods seen  by cytocentrifuge      .Blood Blood-Peripheral  09-30-22   No Growth Final  --  --      .Blood Blood-Peripheral  09-30-22   No Growth Final  --  --      Clean Catch Clean Catch (Midstream)  09-22-22   <10,000 CFU/mL Normal Urogenital Caroline  --  --      .Blood Blood-Peripheral  09-22-22   No Growth Final  --  --      .Blood Blood-Peripheral  09-22-22   No Growth Final  --  --                RADIOLOGY:    <The imaging below has been reviewed and visualized by me independently. Findings as detailed in report below> Follow Up:  Cholecystitis    Interval History: afebrile. continued abdominal pain but improved.     REVIEW OF SYSTEMS  [  ] ROS unobtainable because:    [  x] All other systems negative except as noted below    Constitutional:  [ ] fever [ ] chills  [ ] weight loss  [ ] weakness  Skin:  [ ] rash [ ] phlebitis	  Eyes: [ ] icterus [ ] pain  [ ] discharge	  ENMT: [ ] sore throat  [ ] thrush [ ] ulcers [ ] exudates  Respiratory: [ ] dyspnea [ ] hemoptysis [ ] cough [ ] sputum	  Cardiovascular:  [ ] chest pain [ ] palpitations [ ] edema	  Gastrointestinal:  [ ] nausea [ ] vomiting [ ] diarrhea [ ] constipation [x ] pain	  Genitourinary:  [ ] dysuria [ ] frequency [ ] hematuria [ ] discharge [ ] flank pain  [ ] incontinence  Musculoskeletal:  [ ] myalgias [ ] arthralgias [ ] arthritis  [ ] back pain  Neurological:  [ ] headache [ ] seizures  [ ] confusion/altered mental status    Allergies  No Known Allergies        ANTIMICROBIALS:  caspofungin IVPB 50 every 24 hours  caspofungin IVPB    piperacillin/tazobactam IVPB.. 3.375 every 12 hours      OTHER MEDS:  MEDICATIONS  (STANDING):  acetaminophen     Tablet .. 650 every 6 hours  carvedilol 3.125 every 12 hours  cyclobenzaprine 5 three times a day PRN  danazol 200 three times a day  epoetin filiberto-epbx (RETACRIT) Injectable 13018 <User Schedule>  gabapentin 100 daily  heparin   Injectable 5000 every 8 hours  ondansetron Injectable 4 every 6 hours PRN  oxyCODONE    IR 5 every 6 hours PRN  polyethylene glycol 3350 17 every 12 hours  ruxolitinib 10 <User Schedule>  senna 1 every 12 hours      Vital Signs Last 24 Hrs  T(C): 36.4 (14 Oct 2022 13:20), Max: 37 (14 Oct 2022 01:18)  T(F): 97.5 (14 Oct 2022 13:20), Max: 98.6 (14 Oct 2022 01:18)  HR: 78 (14 Oct 2022 13:20) (66 - 79)  BP: 151/79 (14 Oct 2022 13:20) (108/67 - 153/70)  BP(mean): --  RR: 18 (14 Oct 2022 13:20) (16 - 20)  SpO2: 97% (14 Oct 2022 13:20) (94% - 100%)    Parameters below as of 14 Oct 2022 13:20  Patient On (Oxygen Delivery Method): room air    PHYSICAL EXAMINATION:  General: Alert and Awake, NAD  HEENT: PERRL, EOMI,   Neck: Supple  Cardiac: RRR, No M/R/G  Resp: CTAB, No Wh/Rh/Ra  Abdomen: Dressing over RUQ. Tenderness to palpation in RUQ or RLQ. NBS.   MSK: No LE edema. No stigmata of IE. No evidence of phlebitis. No evidence of synovitis.  : No topete  Skin: No rashes or lesions. Skin is warm and dry to the touch.   Neuro: Alert and Awake. CN 2-12 Grossly intact. Moves all four extremities spontaneously.  Psych: Calm, Pleasant, Cooperative                          8.9    9.69  )-----------( 166      ( 14 Oct 2022 07:36 )             28.5       10-14    136  |  96  |  38<H>  ----------------------------<  74  5.3   |  21<L>  |  7.37<H>    Ca    8.6      14 Oct 2022 07:36  Phos  5.3     10-14  Mg     2.8     10-14    TPro  7.0  /  Alb  3.9  /  TBili  1.3<H>  /  DBili  0.6<H>  /  AST  20  /  ALT  15  /  AlkPhos  75  10-14          MICROBIOLOGY:  v  Bile Bile Fluid  10-12-22   Moderate Klebsiella pneumoniae  Few Gloria tropicalis "Susceptibilities not performed"  --    polymorphonuclear leukocytes seen  Yeast like cells seen  Gram Negative Rods seen  by cytocentrifuge      .Blood Blood-Peripheral  09-30-22   No Growth Final  --  --      .Blood Blood-Peripheral  09-30-22   No Growth Final  --  --      Clean Catch Clean Catch (Midstream)  09-22-22   <10,000 CFU/mL Normal Urogenital Caroline  --  --      .Blood Blood-Peripheral  09-22-22   No Growth Final  --  --      .Blood Blood-Peripheral  09-22-22   No Growth Final  --  --    RADIOLOGY:    <The imaging below has been reviewed and visualized by me independently. Findings as detailed in report below>    < from: Xray Chest 1 View- PORTABLE-Urgent (Xray Chest 1 View- PORTABLE-Urgent .) (10.11.22 @ 16:29) >  IMPRESSION:  Clear lungs.    < end of copied text >

## 2022-10-14 NOTE — PROGRESS NOTE ADULT - ATTENDING COMMENTS
61 yo F hx of myelofibrosis, polycythemia vera, ESRD on MWf schedule via LUE AVF (last dialysis W), portal HTN, subclavian port for transfusions, gastric varices, gallstones, recent ERCP for choledocholithiasis s/p biliary stent, c/b post ERCP pancreatitis now presenting with fever and RUQ and epigastric pain, and fever.  s/p cholecystectomy and post op pain markedly improved.  NO SOB.    1.  ESRD--HD today and TIW  2.  Anemia--INDER titration goal hgb 10  3.  Secondary hyperparathyroidism--PO4 low, hold binder.  Goal >5, <6.  discussed with med team  Jignesh Martinez MD  contact me on TEAMS

## 2022-10-15 ENCOUNTER — OUTPATIENT (OUTPATIENT)
Dept: OUTPATIENT SERVICES | Facility: HOSPITAL | Age: 62
LOS: 1 days | Discharge: HOME | End: 2022-10-15

## 2022-10-15 DIAGNOSIS — Z99.2 DEPENDENCE ON RENAL DIALYSIS: Chronic | ICD-10-CM

## 2022-10-15 DIAGNOSIS — T85.898A OTHER SPECIFIED COMPLICATION OF OTHER INTERNAL PROSTHETIC DEVICES, IMPLANTS AND GRAFTS, INITIAL ENCOUNTER: Chronic | ICD-10-CM

## 2022-10-15 DIAGNOSIS — I77.0 ARTERIOVENOUS FISTULA, ACQUIRED: Chronic | ICD-10-CM

## 2022-10-15 DIAGNOSIS — N18.6 END STAGE RENAL DISEASE: ICD-10-CM

## 2022-10-15 DIAGNOSIS — D45 POLYCYTHEMIA VERA: ICD-10-CM

## 2022-10-15 DIAGNOSIS — D64.9 ANEMIA, UNSPECIFIED: ICD-10-CM

## 2022-10-15 DIAGNOSIS — D75.81 MYELOFIBROSIS: ICD-10-CM

## 2022-10-15 LAB
ALBUMIN SERPL ELPH-MCNC: 3.2 G/DL — LOW (ref 3.3–5)
ALP SERPL-CCNC: 66 U/L — SIGNIFICANT CHANGE UP (ref 40–120)
ALT FLD-CCNC: 12 U/L — SIGNIFICANT CHANGE UP (ref 10–45)
ANION GAP SERPL CALC-SCNC: 15 MMOL/L — SIGNIFICANT CHANGE UP (ref 5–17)
APTT BLD: 42.1 SEC — HIGH (ref 27.5–35.5)
AST SERPL-CCNC: 16 U/L — SIGNIFICANT CHANGE UP (ref 10–40)
BASOPHILS # BLD AUTO: 0.1 K/UL — SIGNIFICANT CHANGE UP (ref 0–0.2)
BASOPHILS NFR BLD AUTO: 1.7 % — SIGNIFICANT CHANGE UP (ref 0–2)
BILIRUB DIRECT SERPL-MCNC: 0.4 MG/DL — HIGH (ref 0–0.3)
BILIRUB INDIRECT FLD-MCNC: 0.6 MG/DL — SIGNIFICANT CHANGE UP (ref 0.2–1)
BILIRUB SERPL-MCNC: 1 MG/DL — SIGNIFICANT CHANGE UP (ref 0.2–1.2)
BUN SERPL-MCNC: 18 MG/DL — SIGNIFICANT CHANGE UP (ref 7–23)
CALCIUM SERPL-MCNC: 8.1 MG/DL — LOW (ref 8.4–10.5)
CHLORIDE SERPL-SCNC: 96 MMOL/L — SIGNIFICANT CHANGE UP (ref 96–108)
CO2 SERPL-SCNC: 26 MMOL/L — SIGNIFICANT CHANGE UP (ref 22–31)
CREAT SERPL-MCNC: 4.87 MG/DL — HIGH (ref 0.5–1.3)
EGFR: 10 ML/MIN/1.73M2 — LOW
EOSINOPHIL # BLD AUTO: 0.09 K/UL — SIGNIFICANT CHANGE UP (ref 0–0.5)
EOSINOPHIL NFR BLD AUTO: 1.5 % — SIGNIFICANT CHANGE UP (ref 0–6)
GLUCOSE SERPL-MCNC: 82 MG/DL — SIGNIFICANT CHANGE UP (ref 70–99)
HCT VFR BLD CALC: 26.1 % — LOW (ref 34.5–45)
HGB BLD-MCNC: 8.4 G/DL — LOW (ref 11.5–15.5)
IMM GRANULOCYTES NFR BLD AUTO: 4 % — HIGH (ref 0–0.9)
INR BLD: 2.34 RATIO — HIGH (ref 0.88–1.16)
LYMPHOCYTES # BLD AUTO: 1.1 K/UL — SIGNIFICANT CHANGE UP (ref 1–3.3)
LYMPHOCYTES # BLD AUTO: 18.4 % — SIGNIFICANT CHANGE UP (ref 13–44)
MAGNESIUM SERPL-MCNC: 2.3 MG/DL — SIGNIFICANT CHANGE UP (ref 1.6–2.6)
MCHC RBC-ENTMCNC: 30.4 PG — SIGNIFICANT CHANGE UP (ref 27–34)
MCHC RBC-ENTMCNC: 32.2 GM/DL — SIGNIFICANT CHANGE UP (ref 32–36)
MCV RBC AUTO: 94.6 FL — SIGNIFICANT CHANGE UP (ref 80–100)
MONOCYTES # BLD AUTO: 0.42 K/UL — SIGNIFICANT CHANGE UP (ref 0–0.9)
MONOCYTES NFR BLD AUTO: 7 % — SIGNIFICANT CHANGE UP (ref 2–14)
NEUTROPHILS # BLD AUTO: 4.02 K/UL — SIGNIFICANT CHANGE UP (ref 1.8–7.4)
NEUTROPHILS NFR BLD AUTO: 67.4 % — SIGNIFICANT CHANGE UP (ref 43–77)
NRBC # BLD: 1 /100 WBCS — HIGH (ref 0–0)
PHOSPHATE SERPL-MCNC: 4.1 MG/DL — SIGNIFICANT CHANGE UP (ref 2.5–4.5)
PLATELET # BLD AUTO: 134 K/UL — LOW (ref 150–400)
POTASSIUM SERPL-MCNC: 3.9 MMOL/L — SIGNIFICANT CHANGE UP (ref 3.5–5.3)
POTASSIUM SERPL-SCNC: 3.9 MMOL/L — SIGNIFICANT CHANGE UP (ref 3.5–5.3)
PROT SERPL-MCNC: 6.4 G/DL — SIGNIFICANT CHANGE UP (ref 6–8.3)
PROTHROM AB SERPL-ACNC: 27.2 SEC — HIGH (ref 10.5–13.4)
RBC # BLD: 2.76 M/UL — LOW (ref 3.8–5.2)
RBC # FLD: 18.8 % — HIGH (ref 10.3–14.5)
SODIUM SERPL-SCNC: 137 MMOL/L — SIGNIFICANT CHANGE UP (ref 135–145)
WBC # BLD: 5.97 K/UL — SIGNIFICANT CHANGE UP (ref 3.8–10.5)
WBC # FLD AUTO: 5.97 K/UL — SIGNIFICANT CHANGE UP (ref 3.8–10.5)

## 2022-10-15 PROCEDURE — 71045 X-RAY EXAM CHEST 1 VIEW: CPT | Mod: 26

## 2022-10-15 RX ORDER — HYDROMORPHONE HYDROCHLORIDE 2 MG/ML
0.5 INJECTION INTRAMUSCULAR; INTRAVENOUS; SUBCUTANEOUS ONCE
Refills: 0 | Status: DISCONTINUED | OUTPATIENT
Start: 2022-10-15 | End: 2022-10-15

## 2022-10-15 RX ADMIN — OXYCODONE HYDROCHLORIDE 5 MILLIGRAM(S): 5 TABLET ORAL at 12:38

## 2022-10-15 RX ADMIN — SENNA PLUS 1 TABLET(S): 8.6 TABLET ORAL at 09:36

## 2022-10-15 RX ADMIN — Medication 650 MILLIGRAM(S): at 17:41

## 2022-10-15 RX ADMIN — PIPERACILLIN AND TAZOBACTAM 200 GRAM(S): 4; .5 INJECTION, POWDER, LYOPHILIZED, FOR SOLUTION INTRAVENOUS at 02:40

## 2022-10-15 RX ADMIN — OXYCODONE HYDROCHLORIDE 5 MILLIGRAM(S): 5 TABLET ORAL at 05:43

## 2022-10-15 RX ADMIN — Medication 650 MILLIGRAM(S): at 16:30

## 2022-10-15 RX ADMIN — GABAPENTIN 100 MILLIGRAM(S): 400 CAPSULE ORAL at 14:17

## 2022-10-15 RX ADMIN — Medication 1 TABLET(S): at 13:02

## 2022-10-15 RX ADMIN — HEPARIN SODIUM 5000 UNIT(S): 5000 INJECTION INTRAVENOUS; SUBCUTANEOUS at 06:11

## 2022-10-15 RX ADMIN — Medication 650 MILLIGRAM(S): at 04:39

## 2022-10-15 RX ADMIN — POLYETHYLENE GLYCOL 3350 17 GRAM(S): 17 POWDER, FOR SOLUTION ORAL at 21:17

## 2022-10-15 RX ADMIN — CASPOFUNGIN ACETATE 260 MILLIGRAM(S): 7 INJECTION, POWDER, LYOPHILIZED, FOR SOLUTION INTRAVENOUS at 16:29

## 2022-10-15 RX ADMIN — HEPARIN SODIUM 5000 UNIT(S): 5000 INJECTION INTRAVENOUS; SUBCUTANEOUS at 21:18

## 2022-10-15 RX ADMIN — Medication 650 MILLIGRAM(S): at 05:43

## 2022-10-15 RX ADMIN — DANAZOL 200 MILLIGRAM(S): 200 CAPSULE ORAL at 06:11

## 2022-10-15 RX ADMIN — CHLORHEXIDINE GLUCONATE 1 APPLICATION(S): 213 SOLUTION TOPICAL at 14:18

## 2022-10-15 RX ADMIN — HYDROMORPHONE HYDROCHLORIDE 0.5 MILLIGRAM(S): 2 INJECTION INTRAMUSCULAR; INTRAVENOUS; SUBCUTANEOUS at 12:59

## 2022-10-15 RX ADMIN — OXYCODONE HYDROCHLORIDE 5 MILLIGRAM(S): 5 TABLET ORAL at 04:41

## 2022-10-15 RX ADMIN — Medication 650 MILLIGRAM(S): at 10:16

## 2022-10-15 RX ADMIN — PIPERACILLIN AND TAZOBACTAM 200 GRAM(S): 4; .5 INJECTION, POWDER, LYOPHILIZED, FOR SOLUTION INTRAVENOUS at 14:17

## 2022-10-15 RX ADMIN — SENNA PLUS 1 TABLET(S): 8.6 TABLET ORAL at 21:17

## 2022-10-15 RX ADMIN — DANAZOL 200 MILLIGRAM(S): 200 CAPSULE ORAL at 21:18

## 2022-10-15 RX ADMIN — Medication 650 MILLIGRAM(S): at 09:36

## 2022-10-15 RX ADMIN — HYDROMORPHONE HYDROCHLORIDE 0.5 MILLIGRAM(S): 2 INJECTION INTRAMUSCULAR; INTRAVENOUS; SUBCUTANEOUS at 13:17

## 2022-10-15 RX ADMIN — OXYCODONE HYDROCHLORIDE 5 MILLIGRAM(S): 5 TABLET ORAL at 11:38

## 2022-10-15 RX ADMIN — CARVEDILOL PHOSPHATE 3.12 MILLIGRAM(S): 80 CAPSULE, EXTENDED RELEASE ORAL at 17:38

## 2022-10-15 RX ADMIN — Medication 1 TABLET(S): at 13:01

## 2022-10-15 RX ADMIN — PIPERACILLIN AND TAZOBACTAM 200 GRAM(S): 4; .5 INJECTION, POWDER, LYOPHILIZED, FOR SOLUTION INTRAVENOUS at 23:10

## 2022-10-15 NOTE — PROGRESS NOTE ADULT - ASSESSMENT
62 Greek speaking F admitted with relevant PMH PV (JAK2+), ESRD on HD, HTN, noncirrhotic portal HTN 2/2 Nodular Regenerative Hyperplasia complicated by gastric varices, choledocholithiasis s/p ERCP 9/22 with stent and stone removal, c/b post ERCP pancreatitis re-admitted for worsening abdominal pain, now s/p open cholecystectomy with bile cxs growing Kleb pneumo and yest    [] POD 3 s/p open cholecystectomy   - Rising INR; continue to monitor   - Diet: Renal diet  - ID (kleb/yeast in bile cx): cont caspo/zosyn   - Plan for ERCP on Monday for stent removal; pre op Sunday   - SQH/SCDs/Spirometry/Out of bed   - Pain management: on Oxycodone   - Cont bowel regimen: senna/miralax     [] ESRD/CKD  - Renal following; HD Friday; next on Monday

## 2022-10-15 NOTE — PROGRESS NOTE ADULT - SUBJECTIVE AND OBJECTIVE BOX
Transplant Surgery Progress Note   --------------------------------------------------------------  open cholecystectomy   DATE 10/12/22   POD #3    Present:   Patient seen and examined with Dr. Dagher, Resident Dr. Hernandez and RNs in AM rounds.   Disciplines not in attendance will be notified of the plan.     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     s/p open cholecystectomy, Found to have purulent cholecystitis.  Bile cxs growing Kleb pneumo and yeast (candida), on Caspo and zosyn     Interval Events:  - no overnight events  - pain well controlled  - + bowel function (passing flatus, no BM)     Potential Discharge date: pending clinical improvement   Education:  Medications  Plan of care:  See Below    MEDICATIONS  (STANDING):  acetaminophen     Tablet .. 650 milliGRAM(s) Oral every 6 hours  carvedilol 3.125 milliGRAM(s) Oral every 12 hours  caspofungin IVPB      caspofungin IVPB 50 milliGRAM(s) IV Intermittent every 24 hours  chlorhexidine 2% Cloths 1 Application(s) Topical daily  danazol 200 milliGRAM(s) Oral three times a day  epoetin filiberto-epbx (RETACRIT) Injectable 43748 Unit(s) IV Push <User Schedule>  gabapentin 100 milliGRAM(s) Oral daily  heparin   Injectable 5000 Unit(s) SubCutaneous every 8 hours  multivitamin 1 Tablet(s) Oral daily  Nephro-deonna 1 Tablet(s) Oral daily  piperacillin/tazobactam IVPB.. 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  Pancreatic cyst  History of myelofibrosis  History of myelofibrosis  Peritoneal dialysis catheter in place Placed 8/9/2017  Hemoperitoneum as complication of peritoneal dialysis s/p removal 9/18  AV fistula Placed 9/18    Vital Signs Last 24 Hrs  T(C): 36.9 (15 Oct 2022 06:03), Max: 36.9 (14 Oct 2022 16:49)  T(F): 98.5 (15 Oct 2022 06:03), Max: 98.5 (14 Oct 2022 21:22)  HR: 73 (15 Oct 2022 06:03) (73 - 87)  BP: 111/65 (15 Oct 2022 06:03) (98/60 - 151/79)  BP(mean): --  RR: 18 (15 Oct 2022 06:03) (18 - 18)  SpO2: 94% (15 Oct 2022 06:03) (94% - 98%)    Parameters below as of 15 Oct 2022 06:03  Patient On (Oxygen Delivery Method): room air    I&O's Summary    14 Oct 2022 07:01  -  15 Oct 2022 07:00  --------------------------------------------------------  IN: 1100 mL / OUT: 1800 mL / NET: -700 mL                        8.4    5.97  )-----------( 134      ( 15 Oct 2022 06:21 )             26.1     10-15    137  |  96  |  18  ----------------------------<  82  3.9   |  26  |  4.87<H>    Ca    8.1<L>      15 Oct 2022 06:20  Phos  4.1     10-15  Mg     2.3     10-15    TPro  6.4  /  Alb  3.2<L>  /  TBili  1.0  /  DBili  0.4<H>  /  AST  16  /  ALT  12  /  AlkPhos  66  10-15    Culture - Fungal, Body Fluid (collected 10-12-22 @ 21:59)  Source: Bile Bile Fluid  Preliminary Report (10-13-22 @ 11:08):    Testing in progress    Culture - Body Fluid with Gram Stain (collected 10-12-22 @ 21:59)  Source: Bile Bile Fluid  Gram Stain (10-13-22 @ 04:41):    polymorphonuclear leukocytes seen    Yeast like cells seen    Gram Negative Rods seen    by cytocentrifuge  Preliminary Report (10-13-22 @ 21:43):    Moderate Klebsiella pneumoniae    Few Gloria tropicalis "Susceptibilities not performed"  Organism: Klebsiella pneumoniae (10-14-22 @ 18:55)  Organism: Klebsiella pneumoniae (10-14-22 @ 18:55)    Review of systems  Gen: No weight changes, fatigue, fevers/chills, weakness  Skin: No rashes  Head/Eyes/Ears/Mouth: No headache; Normal hearing; Normal vision w/o blurriness; No sinus pain/discomfort, sore throat  Respiratory: No dyspnea, cough, wheezing, hemoptysis  CV: No chest pain, PND, orthopnea  GI: C/O mild abdominal pain at surgical site. no diarrhea, constipation, nausea, vomiting, melena, hematochezia  : No increased frequency, dysuria, hematuria, nocturia  MSK: No joint pain/swelling; no back pain; no edema  Neuro: No dizziness/lightheadedness, weakness, seizures, numbness, tingling  Heme: No easy bruising or bleeding  Endo: No heat/cold intolerance  Psych: No significant nervousness, anxiety, stress, depression  All other systems were reviewed and are negative, except as noted.    PHYSICAL EXAM:  Constitutional: Well developed / well nourished  Eyes: Anicteric, PERRLA  ENMT: nc/at, no thrush  Neck: supple  Respiratory: CTA B/L  Cardiovascular: RRR  Gastrointestinal: Soft abdomen, ND, appropriate incisional TTP. Abdominal dressing intact w/o drainage  Genitourinary: Voiding spontaneously  Extremities: SCD's in place and working bilaterally  Vascular: Palpable dp pulses bilaterally  Neurological: A&O x3  Skin: no rashes, ulcerations, lesions  Musculoskeletal: Moving all extremities  Psychiatric: Responsive     Transplant Surgery Progress Note   --------------------------------------------------------------  open cholecystectomy   DATE 10/12/22   POD #3    Present:   Patient seen and examined with Dr. Dagher, RONEL Urena and unit RN during am rounds. Disciplines not in attendance will be notified of the plan.     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     s/p open cholecystectomy, Found to have purulent cholecystitis.  Bile cxs growing Kleb pneumo and yeast (candida), on Caspo and zosyn     Interval Events:  - no overnight events  - pain well controlled  - + bowel function (passing flatus, no BM)     Potential Discharge date: pending clinical improvement   Education:  Medications  Plan of care:  See Below    MEDICATIONS  (STANDING):  acetaminophen     Tablet .. 650 milliGRAM(s) Oral every 6 hours  carvedilol 3.125 milliGRAM(s) Oral every 12 hours  caspofungin IVPB      caspofungin IVPB 50 milliGRAM(s) IV Intermittent every 24 hours  chlorhexidine 2% Cloths 1 Application(s) Topical daily  danazol 200 milliGRAM(s) Oral three times a day  epoetin filiberto-epbx (RETACRIT) Injectable 97596 Unit(s) IV Push <User Schedule>  gabapentin 100 milliGRAM(s) Oral daily  heparin   Injectable 5000 Unit(s) SubCutaneous every 8 hours  multivitamin 1 Tablet(s) Oral daily  Nephro-deonna 1 Tablet(s) Oral daily  piperacillin/tazobactam IVPB.. 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  Pancreatic cyst  History of myelofibrosis  History of myelofibrosis  Peritoneal dialysis catheter in place Placed 8/9/2017  Hemoperitoneum as complication of peritoneal dialysis s/p removal 9/18  AV fistula Placed 9/18    Vital Signs Last 24 Hrs  T(C): 36.9 (15 Oct 2022 06:03), Max: 36.9 (14 Oct 2022 16:49)  T(F): 98.5 (15 Oct 2022 06:03), Max: 98.5 (14 Oct 2022 21:22)  HR: 73 (15 Oct 2022 06:03) (73 - 87)  BP: 111/65 (15 Oct 2022 06:03) (98/60 - 151/79)  BP(mean): --  RR: 18 (15 Oct 2022 06:03) (18 - 18)  SpO2: 94% (15 Oct 2022 06:03) (94% - 98%)    Parameters below as of 15 Oct 2022 06:03  Patient On (Oxygen Delivery Method): room air    I&O's Summary    14 Oct 2022 07:01  -  15 Oct 2022 07:00  --------------------------------------------------------  IN: 1100 mL / OUT: 1800 mL / NET: -700 mL                        8.4    5.97  )-----------( 134      ( 15 Oct 2022 06:21 )             26.1     10-15    137  |  96  |  18  ----------------------------<  82  3.9   |  26  |  4.87<H>    Ca    8.1<L>      15 Oct 2022 06:20  Phos  4.1     10-15  Mg     2.3     10-15    TPro  6.4  /  Alb  3.2<L>  /  TBili  1.0  /  DBili  0.4<H>  /  AST  16  /  ALT  12  /  AlkPhos  66  10-15    Culture - Fungal, Body Fluid (collected 10-12-22 @ 21:59)  Source: Bile Bile Fluid  Preliminary Report (10-13-22 @ 11:08):    Testing in progress    Culture - Body Fluid with Gram Stain (collected 10-12-22 @ 21:59)  Source: Bile Bile Fluid  Gram Stain (10-13-22 @ 04:41):    polymorphonuclear leukocytes seen    Yeast like cells seen    Gram Negative Rods seen    by cytocentrifuge  Preliminary Report (10-13-22 @ 21:43):    Moderate Klebsiella pneumoniae    Few Gloria tropicalis "Susceptibilities not performed"  Organism: Klebsiella pneumoniae (10-14-22 @ 18:55)  Organism: Klebsiella pneumoniae (10-14-22 @ 18:55)    Review of systems  Gen: No weight changes, fatigue, fevers/chills, weakness  Skin: No rashes  Head/Eyes/Ears/Mouth: No headache; Normal hearing; Normal vision w/o blurriness; No sinus pain/discomfort, sore throat  Respiratory: No dyspnea, cough, wheezing, hemoptysis  CV: No chest pain, PND, orthopnea  GI: C/O mild abdominal pain at surgical site. no diarrhea, constipation, nausea, vomiting, melena, hematochezia  : No increased frequency, dysuria, hematuria, nocturia  MSK: No joint pain/swelling; no back pain; no edema  Neuro: No dizziness/lightheadedness, weakness, seizures, numbness, tingling  Heme: No easy bruising or bleeding  Endo: No heat/cold intolerance  Psych: No significant nervousness, anxiety, stress, depression  All other systems were reviewed and are negative, except as noted.    PHYSICAL EXAM:  Constitutional: Well developed / well nourished  Eyes: Anicteric, PERRLA  ENMT: nc/at, no thrush  Neck: supple  Respiratory: CTA B/L  Cardiovascular: RRR  Gastrointestinal: Soft abdomen, ND, appropriate incisional TTP. Abdominal dressing intact w/o drainage  Genitourinary: Voiding spontaneously  Extremities: SCD's in place and working bilaterally  Vascular: Palpable dp pulses bilaterally  Neurological: A&O x3  Skin: no rashes, ulcerations, lesions  Musculoskeletal: Moving all extremities  Psychiatric: Responsive

## 2022-10-16 PROBLEM — Z87.39 PERSONAL HISTORY OF OTHER DISEASES OF THE MUSCULOSKELETAL SYSTEM AND CONNECTIVE TISSUE: Chronic | Status: ACTIVE | Noted: 2022-09-30

## 2022-10-16 LAB
ALBUMIN SERPL ELPH-MCNC: 3.7 G/DL — SIGNIFICANT CHANGE UP (ref 3.3–5)
ALP SERPL-CCNC: 65 U/L — SIGNIFICANT CHANGE UP (ref 40–120)
ALT FLD-CCNC: 9 U/L — LOW (ref 10–45)
ANION GAP SERPL CALC-SCNC: 16 MMOL/L — SIGNIFICANT CHANGE UP (ref 5–17)
APTT BLD: 34.1 SEC — SIGNIFICANT CHANGE UP (ref 27.5–35.5)
AST SERPL-CCNC: 18 U/L — SIGNIFICANT CHANGE UP (ref 10–40)
BASOPHILS # BLD AUTO: 0.11 K/UL — SIGNIFICANT CHANGE UP (ref 0–0.2)
BASOPHILS NFR BLD AUTO: 2.3 % — HIGH (ref 0–2)
BILIRUB DIRECT SERPL-MCNC: 0.4 MG/DL — HIGH (ref 0–0.3)
BILIRUB INDIRECT FLD-MCNC: 0.5 MG/DL — SIGNIFICANT CHANGE UP (ref 0.2–1)
BILIRUB SERPL-MCNC: 0.9 MG/DL — SIGNIFICANT CHANGE UP (ref 0.2–1.2)
BLD GP AB SCN SERPL QL: NEGATIVE — SIGNIFICANT CHANGE UP
BUN SERPL-MCNC: 29 MG/DL — HIGH (ref 7–23)
CALCIUM SERPL-MCNC: 8.4 MG/DL — SIGNIFICANT CHANGE UP (ref 8.4–10.5)
CHLORIDE SERPL-SCNC: 97 MMOL/L — SIGNIFICANT CHANGE UP (ref 96–108)
CO2 SERPL-SCNC: 24 MMOL/L — SIGNIFICANT CHANGE UP (ref 22–31)
CREAT SERPL-MCNC: 6.31 MG/DL — HIGH (ref 0.5–1.3)
EGFR: 7 ML/MIN/1.73M2 — LOW
EOSINOPHIL # BLD AUTO: 0.12 K/UL — SIGNIFICANT CHANGE UP (ref 0–0.5)
EOSINOPHIL NFR BLD AUTO: 2.5 % — SIGNIFICANT CHANGE UP (ref 0–6)
GLUCOSE SERPL-MCNC: 85 MG/DL — SIGNIFICANT CHANGE UP (ref 70–99)
HCT VFR BLD CALC: 27.1 % — LOW (ref 34.5–45)
HGB BLD-MCNC: 8.3 G/DL — LOW (ref 11.5–15.5)
IMM GRANULOCYTES NFR BLD AUTO: 7.8 % — HIGH (ref 0–0.9)
INR BLD: 1.33 RATIO — HIGH (ref 0.88–1.16)
LYMPHOCYTES # BLD AUTO: 1.01 K/UL — SIGNIFICANT CHANGE UP (ref 1–3.3)
LYMPHOCYTES # BLD AUTO: 21.4 % — SIGNIFICANT CHANGE UP (ref 13–44)
MAGNESIUM SERPL-MCNC: 2.5 MG/DL — SIGNIFICANT CHANGE UP (ref 1.6–2.6)
MCHC RBC-ENTMCNC: 29.6 PG — SIGNIFICANT CHANGE UP (ref 27–34)
MCHC RBC-ENTMCNC: 30.6 GM/DL — LOW (ref 32–36)
MCV RBC AUTO: 96.8 FL — SIGNIFICANT CHANGE UP (ref 80–100)
MONOCYTES # BLD AUTO: 0.29 K/UL — SIGNIFICANT CHANGE UP (ref 0–0.9)
MONOCYTES NFR BLD AUTO: 6.1 % — SIGNIFICANT CHANGE UP (ref 2–14)
NEUTROPHILS # BLD AUTO: 2.82 K/UL — SIGNIFICANT CHANGE UP (ref 1.8–7.4)
NEUTROPHILS NFR BLD AUTO: 59.9 % — SIGNIFICANT CHANGE UP (ref 43–77)
NRBC # BLD: 2 /100 WBCS — HIGH (ref 0–0)
PHOSPHATE SERPL-MCNC: 4 MG/DL — SIGNIFICANT CHANGE UP (ref 2.5–4.5)
PLATELET # BLD AUTO: 141 K/UL — LOW (ref 150–400)
POTASSIUM SERPL-MCNC: 4.2 MMOL/L — SIGNIFICANT CHANGE UP (ref 3.5–5.3)
POTASSIUM SERPL-SCNC: 4.2 MMOL/L — SIGNIFICANT CHANGE UP (ref 3.5–5.3)
PROT SERPL-MCNC: 6.8 G/DL — SIGNIFICANT CHANGE UP (ref 6–8.3)
PROTHROM AB SERPL-ACNC: 15.5 SEC — HIGH (ref 10.5–13.4)
RBC # BLD: 2.8 M/UL — LOW (ref 3.8–5.2)
RBC # FLD: 19 % — HIGH (ref 10.3–14.5)
RH IG SCN BLD-IMP: POSITIVE — SIGNIFICANT CHANGE UP
SARS-COV-2 RNA SPEC QL NAA+PROBE: SIGNIFICANT CHANGE UP
SODIUM SERPL-SCNC: 137 MMOL/L — SIGNIFICANT CHANGE UP (ref 135–145)
WBC # BLD: 4.72 K/UL — SIGNIFICANT CHANGE UP (ref 3.8–10.5)
WBC # FLD AUTO: 4.72 K/UL — SIGNIFICANT CHANGE UP (ref 3.8–10.5)

## 2022-10-16 RX ORDER — PIPERACILLIN AND TAZOBACTAM 4; .5 G/20ML; G/20ML
3.38 INJECTION, POWDER, LYOPHILIZED, FOR SOLUTION INTRAVENOUS EVERY 12 HOURS
Refills: 0 | Status: DISCONTINUED | OUTPATIENT
Start: 2022-10-16 | End: 2022-10-18

## 2022-10-16 RX ADMIN — Medication 650 MILLIGRAM(S): at 05:59

## 2022-10-16 RX ADMIN — CASPOFUNGIN ACETATE 260 MILLIGRAM(S): 7 INJECTION, POWDER, LYOPHILIZED, FOR SOLUTION INTRAVENOUS at 11:46

## 2022-10-16 RX ADMIN — HEPARIN SODIUM 5000 UNIT(S): 5000 INJECTION INTRAVENOUS; SUBCUTANEOUS at 21:17

## 2022-10-16 RX ADMIN — POLYETHYLENE GLYCOL 3350 17 GRAM(S): 17 POWDER, FOR SOLUTION ORAL at 11:46

## 2022-10-16 RX ADMIN — DANAZOL 200 MILLIGRAM(S): 200 CAPSULE ORAL at 13:03

## 2022-10-16 RX ADMIN — CHLORHEXIDINE GLUCONATE 1 APPLICATION(S): 213 SOLUTION TOPICAL at 13:02

## 2022-10-16 RX ADMIN — DANAZOL 200 MILLIGRAM(S): 200 CAPSULE ORAL at 21:13

## 2022-10-16 RX ADMIN — Medication 650 MILLIGRAM(S): at 17:21

## 2022-10-16 RX ADMIN — Medication 10 MILLIGRAM(S): at 11:46

## 2022-10-16 RX ADMIN — HEPARIN SODIUM 5000 UNIT(S): 5000 INJECTION INTRAVENOUS; SUBCUTANEOUS at 13:03

## 2022-10-16 RX ADMIN — Medication 650 MILLIGRAM(S): at 06:30

## 2022-10-16 RX ADMIN — CARVEDILOL PHOSPHATE 3.12 MILLIGRAM(S): 80 CAPSULE, EXTENDED RELEASE ORAL at 06:24

## 2022-10-16 RX ADMIN — DANAZOL 200 MILLIGRAM(S): 200 CAPSULE ORAL at 05:59

## 2022-10-16 RX ADMIN — Medication 1 TABLET(S): at 11:47

## 2022-10-16 RX ADMIN — CARVEDILOL PHOSPHATE 3.12 MILLIGRAM(S): 80 CAPSULE, EXTENDED RELEASE ORAL at 17:21

## 2022-10-16 RX ADMIN — PIPERACILLIN AND TAZOBACTAM 25 GRAM(S): 4; .5 INJECTION, POWDER, LYOPHILIZED, FOR SOLUTION INTRAVENOUS at 13:00

## 2022-10-16 RX ADMIN — SENNA PLUS 1 TABLET(S): 8.6 TABLET ORAL at 11:47

## 2022-10-16 RX ADMIN — GABAPENTIN 100 MILLIGRAM(S): 400 CAPSULE ORAL at 11:47

## 2022-10-16 RX ADMIN — HEPARIN SODIUM 5000 UNIT(S): 5000 INJECTION INTRAVENOUS; SUBCUTANEOUS at 06:00

## 2022-10-16 NOTE — PROGRESS NOTE ADULT - SUBJECTIVE AND OBJECTIVE BOX
Transplant Surgery Progress Note   --------------------------------------------------------------  open cholecystectomy   DATE 10/12/22   POD #4    Present:   Patient seen and examined with Dr. Dagher, RONEL Urena and unit RN during am rounds. Disciplines not in attendance will be notified of the plan.     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     s/p open cholecystectomy, Found to have purulent cholecystitis.  Bile cxs growing Kleb pneumo and yeast (candida), on Caspo and zosyn     Interval Events:  - no overnight events  - pain well controlled  - no BM, passing flatus.   - INR trending down 1.3 (from 2.3)    Potential Discharge date: pending clinical improvement   Education:  Medications  Plan of care:  See Below    MEDICATIONS  (STANDING):  acetaminophen     Tablet .. 650 milliGRAM(s) Oral every 6 hours  carvedilol 3.125 milliGRAM(s) Oral every 12 hours  caspofungin IVPB 50 milliGRAM(s) IV Intermittent every 24 hours  caspofungin IVPB      chlorhexidine 2% Cloths 1 Application(s) Topical daily  danazol 200 milliGRAM(s) Oral three times a day  epoetin filiberto-epbx (RETACRIT) Injectable 63691 Unit(s) IV Push <User Schedule>  gabapentin 100 milliGRAM(s) Oral daily  heparin   Injectable 5000 Unit(s) SubCutaneous every 8 hours  multivitamin 1 Tablet(s) Oral daily  Nephro-deonna 1 Tablet(s) Oral daily  piperacillin/tazobactam IVPB.. 3.375 Gram(s) IV Intermittent every 12 hours  polyethylene glycol 3350 17 Gram(s) Oral every 12 hours  ruxolitinib 10 milliGRAM(s) Oral <User Schedule>  senna 1 Tablet(s) Oral every 12 hours    MEDICATIONS  (PRN):  bisacodyl 10 milliGRAM(s) Oral once PRN Constipation  cyclobenzaprine 5 milliGRAM(s) Oral three times a day PRN Muscle Spasm  ondansetron Injectable 4 milliGRAM(s) IV Push every 6 hours PRN Nausea and/or Vomiting  oxyCODONE    IR 5 milliGRAM(s) Oral every 6 hours PRN Moderate Pain (4 - 6)    Vital Signs Last 24 Hrs  T(C): 36.7 (16 Oct 2022 08:40), Max: 36.8 (15 Oct 2022 12:34)  T(F): 98.1 (16 Oct 2022 08:40), Max: 98.2 (15 Oct 2022 12:34)  HR: 65 (16 Oct 2022 08:40) (62 - 72)  BP: 114/74 (16 Oct 2022 08:40) (114/74 - 147/78)  BP(mean): --  RR: 18 (16 Oct 2022 08:40) (18 - 20)  SpO2: 98% (16 Oct 2022 08:40) (95% - 98%)    Parameters below as of 16 Oct 2022 08:40  Patient On (Oxygen Delivery Method): room air                         8.3    4.72  )-----------( 141      ( 16 Oct 2022 06:44 )             27.1     10-16    137  |  97  |  29<H>  ----------------------------<  85  4.2   |  24  |  6.31<H>    Ca    8.4      16 Oct 2022 06:44  Phos  4.0     10-16  Mg     2.5     10-16    TPro  6.8  /  Alb  3.7  /  TBili  0.9  /  DBili  0.4<H>  /  AST  18  /  ALT  9<L>  /  AlkPhos  65  10-16    Culture - Fungal, Body Fluid (collected 10-12-22 @ 21:59)  Source: Bile Bile Fluid  Preliminary Report (10-13-22 @ 11:08):    Testing in progress    Culture - Body Fluid with Gram Stain (collected 10-12-22 @ 21:59)  Source: Bile Bile Fluid  Gram Stain (10-13-22 @ 04:41):    polymorphonuclear leukocytes seen    Yeast like cells seen    Gram Negative Rods seen    by cytocentrifuge  Preliminary Report (10-13-22 @ 21:43):    Moderate Klebsiella pneumoniae    Few Gloria tropicalis "Susceptibilities not performed"  Organism: Klebsiella pneumoniae (10-14-22 @ 18:55)  Organism: Klebsiella pneumoniae (10-14-22 @ 18:55)        Review of systems  Gen: No weight changes, fatigue, fevers/chills, weakness  Skin: No rashes  Head/Eyes/Ears/Mouth: No headache; Normal hearing; Normal vision w/o blurriness; No sinus pain/discomfort, sore throat  Respiratory: No dyspnea, cough, wheezing, hemoptysis  CV: No chest pain, PND, orthopnea  GI: C/O mild abdominal pain at surgical site. no diarrhea, constipation, nausea, vomiting, melena, hematochezia  : No increased frequency, dysuria, hematuria, nocturia  MSK: No joint pain/swelling; no back pain; no edema  Neuro: No dizziness/lightheadedness, weakness, seizures, numbness, tingling  Heme: No easy bruising or bleeding  Endo: No heat/cold intolerance  Psych: No significant nervousness, anxiety, stress, depression  All other systems were reviewed and are negative, except as noted.    PHYSICAL EXAM:  Constitutional: Well developed / well nourished  Eyes: Anicteric, PERRLA  ENMT: nc/at, no thrush  Neck: supple  Respiratory: CTA B/L  Cardiovascular: RRR  Gastrointestinal: Soft abdomen, ND, appropriate incisional TTP. Abdominal dressing intact w/o drainage  Genitourinary: Voiding spontaneously  Extremities: SCD's in place and working bilaterally  Vascular: Palpable dp pulses bilaterally  Neurological: A&O x3  Skin: no rashes, ulcerations, lesions  Musculoskeletal: Moving all extremities  Psychiatric: Responsive

## 2022-10-16 NOTE — PROGRESS NOTE ADULT - ASSESSMENT
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 complicated by gastric varices, choledocholithiasis s/p ERCP 9/22 with stent and stone removal, c/b post ERCP pancreatitis re-admitted for worsening abdominal pain, now s/p open cholecystectomy with bile cxs growing Kleb pneumo and yest    [] POD 4 s/p open cholecystectomy   - Tolerating reg diet  - Pain well controlled  - ID (kleb/yeast in bile cx): cont caspo/zosyn, discuss LOT for dc plannig   - ERCP on Monday for stent removal; NPO post midnight   - SQH/SCDs/Spirometry/Out of bed   - Pain management: on Oxycodone   - Cont bowel regimen: senna/miralax, add dulcolax     [] ESRD/CKD  - Renal following; HD tomorrow

## 2022-10-17 LAB
ALBUMIN SERPL ELPH-MCNC: 3.5 G/DL — SIGNIFICANT CHANGE UP (ref 3.3–5)
ALBUMIN SERPL ELPH-MCNC: 3.8 G/DL — SIGNIFICANT CHANGE UP (ref 3.3–5)
ALP SERPL-CCNC: 63 U/L — SIGNIFICANT CHANGE UP (ref 40–120)
ALP SERPL-CCNC: 73 U/L — SIGNIFICANT CHANGE UP (ref 40–120)
ALT FLD-CCNC: 14 U/L — SIGNIFICANT CHANGE UP (ref 10–45)
ALT FLD-CCNC: 19 U/L — SIGNIFICANT CHANGE UP (ref 10–45)
AMYLASE P1 CFR SERPL: 41 U/L — SIGNIFICANT CHANGE UP (ref 25–125)
ANION GAP SERPL CALC-SCNC: 14 MMOL/L — SIGNIFICANT CHANGE UP (ref 5–17)
ANION GAP SERPL CALC-SCNC: 16 MMOL/L — SIGNIFICANT CHANGE UP (ref 5–17)
APTT BLD: 32.6 SEC — SIGNIFICANT CHANGE UP (ref 27.5–35.5)
AST SERPL-CCNC: 19 U/L — SIGNIFICANT CHANGE UP (ref 10–40)
AST SERPL-CCNC: 33 U/L — SIGNIFICANT CHANGE UP (ref 10–40)
BASOPHILS # BLD AUTO: 0.09 K/UL — SIGNIFICANT CHANGE UP (ref 0–0.2)
BASOPHILS # BLD AUTO: 0.11 K/UL — SIGNIFICANT CHANGE UP (ref 0–0.2)
BASOPHILS NFR BLD AUTO: 1.6 % — SIGNIFICANT CHANGE UP (ref 0–2)
BASOPHILS NFR BLD AUTO: 1.7 % — SIGNIFICANT CHANGE UP (ref 0–2)
BILIRUB DIRECT SERPL-MCNC: 0.4 MG/DL — HIGH (ref 0–0.3)
BILIRUB DIRECT SERPL-MCNC: 0.4 MG/DL — HIGH (ref 0–0.3)
BILIRUB INDIRECT FLD-MCNC: 0.4 MG/DL — SIGNIFICANT CHANGE UP (ref 0.2–1)
BILIRUB INDIRECT FLD-MCNC: 0.6 MG/DL — SIGNIFICANT CHANGE UP (ref 0.2–1)
BILIRUB SERPL-MCNC: 0.8 MG/DL — SIGNIFICANT CHANGE UP (ref 0.2–1.2)
BILIRUB SERPL-MCNC: 1 MG/DL — SIGNIFICANT CHANGE UP (ref 0.2–1.2)
BUN SERPL-MCNC: 12 MG/DL — SIGNIFICANT CHANGE UP (ref 7–23)
BUN SERPL-MCNC: 35 MG/DL — HIGH (ref 7–23)
CALCIUM SERPL-MCNC: 8.3 MG/DL — LOW (ref 8.4–10.5)
CALCIUM SERPL-MCNC: 8.8 MG/DL — SIGNIFICANT CHANGE UP (ref 8.4–10.5)
CHLORIDE SERPL-SCNC: 102 MMOL/L — SIGNIFICANT CHANGE UP (ref 96–108)
CHLORIDE SERPL-SCNC: 98 MMOL/L — SIGNIFICANT CHANGE UP (ref 96–108)
CO2 SERPL-SCNC: 21 MMOL/L — LOW (ref 22–31)
CO2 SERPL-SCNC: 27 MMOL/L — SIGNIFICANT CHANGE UP (ref 22–31)
CREAT SERPL-MCNC: 3.3 MG/DL — HIGH (ref 0.5–1.3)
CREAT SERPL-MCNC: 7.27 MG/DL — HIGH (ref 0.5–1.3)
CULTURE RESULTS: SIGNIFICANT CHANGE UP
EGFR: 15 ML/MIN/1.73M2 — LOW
EGFR: 6 ML/MIN/1.73M2 — LOW
EOSINOPHIL # BLD AUTO: 0.14 K/UL — SIGNIFICANT CHANGE UP (ref 0–0.5)
EOSINOPHIL # BLD AUTO: 0.19 K/UL — SIGNIFICANT CHANGE UP (ref 0–0.5)
EOSINOPHIL NFR BLD AUTO: 2 % — SIGNIFICANT CHANGE UP (ref 0–6)
EOSINOPHIL NFR BLD AUTO: 3.5 % — SIGNIFICANT CHANGE UP (ref 0–6)
GLUCOSE SERPL-MCNC: 73 MG/DL — SIGNIFICANT CHANGE UP (ref 70–99)
GLUCOSE SERPL-MCNC: 85 MG/DL — SIGNIFICANT CHANGE UP (ref 70–99)
HCT VFR BLD CALC: 26.9 % — LOW (ref 34.5–45)
HCT VFR BLD CALC: 29.8 % — LOW (ref 34.5–45)
HGB BLD-MCNC: 8.2 G/DL — LOW (ref 11.5–15.5)
HGB BLD-MCNC: 9.3 G/DL — LOW (ref 11.5–15.5)
IMM GRANULOCYTES NFR BLD AUTO: 7.4 % — HIGH (ref 0–0.9)
IMM GRANULOCYTES NFR BLD AUTO: 9 % — HIGH (ref 0–0.9)
INR BLD: 1.21 RATIO — HIGH (ref 0.88–1.16)
LIDOCAIN IGE QN: 44 U/L — SIGNIFICANT CHANGE UP (ref 7–60)
LYMPHOCYTES # BLD AUTO: 0.53 K/UL — LOW (ref 1–3.3)
LYMPHOCYTES # BLD AUTO: 0.72 K/UL — LOW (ref 1–3.3)
LYMPHOCYTES # BLD AUTO: 13.4 % — SIGNIFICANT CHANGE UP (ref 13–44)
LYMPHOCYTES # BLD AUTO: 7.6 % — LOW (ref 13–44)
MAGNESIUM SERPL-MCNC: 2.4 MG/DL — SIGNIFICANT CHANGE UP (ref 1.6–2.6)
MCHC RBC-ENTMCNC: 29.4 PG — SIGNIFICANT CHANGE UP (ref 27–34)
MCHC RBC-ENTMCNC: 29.6 PG — SIGNIFICANT CHANGE UP (ref 27–34)
MCHC RBC-ENTMCNC: 30.5 GM/DL — LOW (ref 32–36)
MCHC RBC-ENTMCNC: 31.2 GM/DL — LOW (ref 32–36)
MCV RBC AUTO: 94.9 FL — SIGNIFICANT CHANGE UP (ref 80–100)
MCV RBC AUTO: 96.4 FL — SIGNIFICANT CHANGE UP (ref 80–100)
MONOCYTES # BLD AUTO: 0.31 K/UL — SIGNIFICANT CHANGE UP (ref 0–0.9)
MONOCYTES # BLD AUTO: 0.4 K/UL — SIGNIFICANT CHANGE UP (ref 0–0.9)
MONOCYTES NFR BLD AUTO: 5.7 % — SIGNIFICANT CHANGE UP (ref 2–14)
MONOCYTES NFR BLD AUTO: 5.8 % — SIGNIFICANT CHANGE UP (ref 2–14)
NEUTROPHILS # BLD AUTO: 3.66 K/UL — SIGNIFICANT CHANGE UP (ref 1.8–7.4)
NEUTROPHILS # BLD AUTO: 5.19 K/UL — SIGNIFICANT CHANGE UP (ref 1.8–7.4)
NEUTROPHILS NFR BLD AUTO: 68.2 % — SIGNIFICANT CHANGE UP (ref 43–77)
NEUTROPHILS NFR BLD AUTO: 74.1 % — SIGNIFICANT CHANGE UP (ref 43–77)
NRBC # BLD: 1 /100 WBCS — HIGH (ref 0–0)
NRBC # BLD: 1 /100 WBCS — HIGH (ref 0–0)
ORGANISM # SPEC MICROSCOPIC CNT: SIGNIFICANT CHANGE UP
ORGANISM # SPEC MICROSCOPIC CNT: SIGNIFICANT CHANGE UP
PHOSPHATE SERPL-MCNC: 3.9 MG/DL — SIGNIFICANT CHANGE UP (ref 2.5–4.5)
PLATELET # BLD AUTO: 129 K/UL — LOW (ref 150–400)
PLATELET # BLD AUTO: 147 K/UL — LOW (ref 150–400)
POTASSIUM SERPL-MCNC: 3.3 MMOL/L — LOW (ref 3.5–5.3)
POTASSIUM SERPL-MCNC: 4.4 MMOL/L — SIGNIFICANT CHANGE UP (ref 3.5–5.3)
POTASSIUM SERPL-SCNC: 3.3 MMOL/L — LOW (ref 3.5–5.3)
POTASSIUM SERPL-SCNC: 4.4 MMOL/L — SIGNIFICANT CHANGE UP (ref 3.5–5.3)
PROT SERPL-MCNC: 6.5 G/DL — SIGNIFICANT CHANGE UP (ref 6–8.3)
PROT SERPL-MCNC: 7.2 G/DL — SIGNIFICANT CHANGE UP (ref 6–8.3)
PROTHROM AB SERPL-ACNC: 14 SEC — HIGH (ref 10.5–13.4)
RBC # BLD: 2.79 M/UL — LOW (ref 3.8–5.2)
RBC # BLD: 3.14 M/UL — LOW (ref 3.8–5.2)
RBC # FLD: 18.9 % — HIGH (ref 10.3–14.5)
RBC # FLD: 19.1 % — HIGH (ref 10.3–14.5)
SODIUM SERPL-SCNC: 139 MMOL/L — SIGNIFICANT CHANGE UP (ref 135–145)
SODIUM SERPL-SCNC: 139 MMOL/L — SIGNIFICANT CHANGE UP (ref 135–145)
SPECIMEN SOURCE: SIGNIFICANT CHANGE UP
WBC # BLD: 5.37 K/UL — SIGNIFICANT CHANGE UP (ref 3.8–10.5)
WBC # BLD: 7 K/UL — SIGNIFICANT CHANGE UP (ref 3.8–10.5)
WBC # FLD AUTO: 5.37 K/UL — SIGNIFICANT CHANGE UP (ref 3.8–10.5)
WBC # FLD AUTO: 7 K/UL — SIGNIFICANT CHANGE UP (ref 3.8–10.5)

## 2022-10-17 PROCEDURE — 90935 HEMODIALYSIS ONE EVALUATION: CPT | Mod: GC

## 2022-10-17 PROCEDURE — 74328 X-RAY BILE DUCT ENDOSCOPY: CPT | Mod: 26

## 2022-10-17 PROCEDURE — 99232 SBSQ HOSP IP/OBS MODERATE 35: CPT

## 2022-10-17 PROCEDURE — 43275 ERCP REMOVE FORGN BODY DUCT: CPT | Mod: GC

## 2022-10-17 DEVICE — BLLN EXTRACT FUSION QUATRO 8.5 10 12 15MM: Type: IMPLANTABLE DEVICE | Status: FUNCTIONAL

## 2022-10-17 DEVICE — CATH BLLN EXTRACT DIST GUIDE WIRE 15MM 3LUM: Type: IMPLANTABLE DEVICE | Status: FUNCTIONAL

## 2022-10-17 DEVICE — HYDRATOME 44: Type: IMPLANTABLE DEVICE | Status: FUNCTIONAL

## 2022-10-17 DEVICE — AUTOTOME CANNULATING SPHINCTEROTOME RX 44 20MM: Type: IMPLANTABLE DEVICE | Status: FUNCTIONAL

## 2022-10-17 RX ORDER — SIMETHICONE 80 MG/1
80 TABLET, CHEWABLE ORAL ONCE
Refills: 0 | Status: COMPLETED | OUTPATIENT
Start: 2022-10-17 | End: 2022-10-17

## 2022-10-17 RX ORDER — POTASSIUM CHLORIDE 20 MEQ
40 PACKET (EA) ORAL ONCE
Refills: 0 | Status: COMPLETED | OUTPATIENT
Start: 2022-10-17 | End: 2022-10-17

## 2022-10-17 RX ORDER — OXYCODONE HYDROCHLORIDE 5 MG/1
5 TABLET ORAL ONCE
Refills: 0 | Status: DISCONTINUED | OUTPATIENT
Start: 2022-10-17 | End: 2022-10-18

## 2022-10-17 RX ORDER — SODIUM CHLORIDE 9 MG/ML
1000 INJECTION, SOLUTION INTRAVENOUS
Refills: 0 | Status: DISCONTINUED | OUTPATIENT
Start: 2022-10-17 | End: 2022-10-17

## 2022-10-17 RX ORDER — INDOMETHACIN 50 MG
100 CAPSULE ORAL ONCE
Refills: 0 | Status: DISCONTINUED | OUTPATIENT
Start: 2022-10-17 | End: 2022-10-18

## 2022-10-17 RX ORDER — CALCIUM CARBONATE 500(1250)
2 TABLET ORAL ONCE
Refills: 0 | Status: DISCONTINUED | OUTPATIENT
Start: 2022-10-17 | End: 2022-10-18

## 2022-10-17 RX ADMIN — Medication 1 TABLET(S): at 12:51

## 2022-10-17 RX ADMIN — PIPERACILLIN AND TAZOBACTAM 25 GRAM(S): 4; .5 INJECTION, POWDER, LYOPHILIZED, FOR SOLUTION INTRAVENOUS at 23:42

## 2022-10-17 RX ADMIN — Medication 650 MILLIGRAM(S): at 03:31

## 2022-10-17 RX ADMIN — Medication 40 MILLIEQUIVALENT(S): at 21:30

## 2022-10-17 RX ADMIN — CARVEDILOL PHOSPHATE 3.12 MILLIGRAM(S): 80 CAPSULE, EXTENDED RELEASE ORAL at 18:02

## 2022-10-17 RX ADMIN — DANAZOL 200 MILLIGRAM(S): 200 CAPSULE ORAL at 21:29

## 2022-10-17 RX ADMIN — Medication 650 MILLIGRAM(S): at 21:29

## 2022-10-17 RX ADMIN — CASPOFUNGIN ACETATE 260 MILLIGRAM(S): 7 INJECTION, POWDER, LYOPHILIZED, FOR SOLUTION INTRAVENOUS at 13:09

## 2022-10-17 RX ADMIN — DANAZOL 200 MILLIGRAM(S): 200 CAPSULE ORAL at 12:51

## 2022-10-17 RX ADMIN — GABAPENTIN 100 MILLIGRAM(S): 400 CAPSULE ORAL at 12:52

## 2022-10-17 RX ADMIN — CHLORHEXIDINE GLUCONATE 1 APPLICATION(S): 213 SOLUTION TOPICAL at 12:52

## 2022-10-17 RX ADMIN — PIPERACILLIN AND TAZOBACTAM 25 GRAM(S): 4; .5 INJECTION, POWDER, LYOPHILIZED, FOR SOLUTION INTRAVENOUS at 00:14

## 2022-10-17 RX ADMIN — CYCLOBENZAPRINE HYDROCHLORIDE 5 MILLIGRAM(S): 10 TABLET, FILM COATED ORAL at 23:47

## 2022-10-17 RX ADMIN — SIMETHICONE 80 MILLIGRAM(S): 80 TABLET, CHEWABLE ORAL at 20:05

## 2022-10-17 RX ADMIN — DANAZOL 200 MILLIGRAM(S): 200 CAPSULE ORAL at 05:50

## 2022-10-17 RX ADMIN — Medication 650 MILLIGRAM(S): at 03:01

## 2022-10-17 RX ADMIN — Medication 650 MILLIGRAM(S): at 12:52

## 2022-10-17 RX ADMIN — OXYCODONE HYDROCHLORIDE 5 MILLIGRAM(S): 5 TABLET ORAL at 18:57

## 2022-10-17 RX ADMIN — Medication 1 TABLET(S): at 12:52

## 2022-10-17 RX ADMIN — ERYTHROPOIETIN 20000 UNIT(S): 10000 INJECTION, SOLUTION INTRAVENOUS; SUBCUTANEOUS at 09:57

## 2022-10-17 RX ADMIN — OXYCODONE HYDROCHLORIDE 5 MILLIGRAM(S): 5 TABLET ORAL at 18:05

## 2022-10-17 NOTE — PROGRESS NOTE ADULT - PROBLEM SELECTOR PLAN 1
Pt. with ESRD on HD three times a week (MWF). Last HD was done on 10/14/22 via [facility]. Labs reviewed. pt seen during HD rounds today , tolerating HD well. Dose meds as per HD.

## 2022-10-17 NOTE — PRE PROCEDURE NOTE - PRE PROCEDURE EVALUATION
Attending Physician:       Dr. Mcgrath                     Procedure: ERCP    Indication for Procedure: stent removal  ________________________________________________________  PAST MEDICAL & SURGICAL HISTORY:  Polycythemia vera  and secondary myelofibrosis      Peptic ulcer disease      Hypertension      Splenomegaly  2015      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      Pancreatic cyst      History of myelofibrosis      History of myelofibrosis      Peritoneal dialysis catheter in place  Placed 8/9/2017      Hemoperitoneum as complication of peritoneal dialysis  s/p removal 9/18      AV fistula  Placed 9/18        ALLERGIES:  No Known Allergies    HOME MEDICATIONS:  epoetin filiberto: 99564 unit(s) intravenous Monday, Wednesday, and Friday with dialysis as per nephrology    AICD/PPM: [ ] yes   [x] no    PERTINENT LAB DATA:                        9.3    7.00  )-----------( 147      ( 17 Oct 2022 13:55 )             29.8     10-17    139  |  98  |  12  ----------------------------<  85  3.3<L>   |  27  |  3.30<H>    Ca    8.8      17 Oct 2022 13:55  Phos  3.9     10-17  Mg     2.4     10-17    TPro  6.5  /  Alb  3.5  /  TBili  0.8  /  DBili  0.4<H>  /  AST  19  /  ALT  14  /  AlkPhos  63  10-17    PT/INR - ( 17 Oct 2022 06:21 )   PT: 14.0 sec;   INR: 1.21 ratio         PTT - ( 17 Oct 2022 06:21 )  PTT:32.6 sec            PHYSICAL EXAMINATION:    T(C): 36.9  HR: 77  BP: 126/76  RR: 18  SpO2: 96%    Constitutional: NAD    Respiratory: CTAB/L  Cardiovascular: S1 and S2  Gastrointestinal: BS+, soft, NT/ND  Extremities: No peripheral edema  Neurological: A/O x 3, no focal deficits        COMMENTS:    The patient is a suitable candidate for the planned procedure unless box checked [ ]  No, explain:

## 2022-10-17 NOTE — PROGRESS NOTE ADULT - ATTENDING SUPERVISION STATEMENT
Fellow
Resident

## 2022-10-17 NOTE — PROGRESS NOTE ADULT - ASSESSMENT
62 year old female PMH  myelofibrosis, polycythemia vera, ESRD on MFW schedule via L[facility] AVF (last dialysis W), portal HTN, subclavian port for transfusions, gastric varices, gallstones, recent ERCP for choledocolithiasis s/p biliary stent, c/b post ERCP pancreatitis + pneumobilia who presented with fever and abdominal pain.     COVID19/FLU/RSV PCR (9/30) Negative  COVID19 PCR (10/7, 10/11) Negative  MRSA/MSSA PCR (10/2) Negative  BCx (9/30) NGTD    MRCP (10/4) Mildly distended gallbladder with nonspecific diffuse wall thickening and   air and Few scattered subcentimeter hepatic lesions are suspicious for microabscesses.    On 10/12/22 s/p open cholecystectomy   Bile Cultures (10/12) Klebsiella and Candida tropicalis    Antibiotic Course:  Zosyn: 9/30 --> 10/9, 10/12 -->   Caspofungin: 10/13 -->    #Suppurative Cholecystitis, Fever, Positive Culture Finding (Candida tropicalis and Klebsiella on bile culture)  Given microabscess finding on MRCP would complete total 4 weeks of antibiotics / antifungals.   --Switch Caspofungin to Fluconazole 200 mg PO Q24H (after HD on HD days) (End Date: 11/10/22)  --Switch Zosyn to Augmentin 500 mg PO Q24H (after HD on HD days) (End Date: 10/28/22)    I will sign off at this time. Please feel free to contact me with any further questions or concerns.    Juancho Kelly M.D.  [facility] of Infectious Disease  8AM-5PM Monday - Friday: Available on [employer] Teams  After Hours and Holidays (or if no response on [employer] Teams): Please contact the Infectious Diseases Office at [phone]     The above assessment and plan were discussed with Manoj, transplant surgery NP

## 2022-10-17 NOTE — PROGRESS NOTE ADULT - PROBLEM SELECTOR PROBLEM 3
Hyperphosphatemia
History of myelofibrosis
Hyperphosphatemia
History of myelofibrosis
Hyperphosphatemia
History of myelofibrosis
Hyperphosphatemia
History of myelofibrosis
History of myelofibrosis
Hyperphosphatemia
Hyperphosphatemia
History of myelofibrosis

## 2022-10-17 NOTE — PROGRESS NOTE ADULT - SUBJECTIVE AND OBJECTIVE BOX
Transplant Surgery Progress Note   --------------------------------------------------------------  open cholecystectomy   DATE 10/12/22   POD #5    Present:   Patient seen and examined with Dr. Dagher, Dr. Singh, NP Aaron, pharmacist HALLE Decker and unit RN during am rounds. Disciplines not in attendance will be notified of the plan.     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     s/p open cholecystectomy, Found to have purulent cholecystitis.  Bile cxs growing Kleb pneumo and yeast (candida), on Caspo and zosyn     Interval Events:  - no overnight events  - pain well controlled  - no BM, passing flatus.   - INR trending down 1.3 (from 2.3)  - NPO for ERCP today    Potential Discharge date: pending clinical improvement   Education:  Medications  Plan of care:  See Below       MEDICATIONS  (STANDING):  acetaminophen     Tablet .. 650 milliGRAM(s) Oral every 6 hours  carvedilol 3.125 milliGRAM(s) Oral every 12 hours  caspofungin IVPB 50 milliGRAM(s) IV Intermittent every 24 hours  caspofungin IVPB      chlorhexidine 2% Cloths 1 Application(s) Topical daily  danazol 200 milliGRAM(s) Oral three times a day  epoetin filiberto-epbx (RETACRIT) Injectable 04356 Unit(s) IV Push <User Schedule>  gabapentin 100 milliGRAM(s) Oral daily  indomethacin Suppository 100 milliGRAM(s) Rectal once  lactated ringers. 1000 milliLiter(s) (75 mL/Hr) IV Continuous <Continuous>  multivitamin 1 Tablet(s) Oral daily  Nephro-deonna 1 Tablet(s) Oral daily  piperacillin/tazobactam IVPB.. 3.375 Gram(s) IV Intermittent every 12 hours  polyethylene glycol 3350 17 Gram(s) Oral every 12 hours  potassium chloride    Tablet ER 40 milliEquivalent(s) Oral once  ruxolitinib 10 milliGRAM(s) Oral <User Schedule>  senna 1 Tablet(s) Oral every 12 hours    MEDICATIONS  (PRN):  cyclobenzaprine 5 milliGRAM(s) Oral three times a day PRN Muscle Spasm  ondansetron Injectable 4 milliGRAM(s) IV Push every 6 hours PRN Nausea and/or Vomiting  oxyCODONE    IR 5 milliGRAM(s) Oral every 6 hours PRN Moderate Pain (4 - 6)      PAST MEDICAL & SURGICAL HISTORY:  Polycythemia vera  and secondary myelofibrosis      Peptic ulcer disease      Hypertension      Splenomegaly  2015      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      Pancreatic cyst      History of myelofibrosis      History of myelofibrosis      Peritoneal dialysis catheter in place  Placed 8/9/2017      Hemoperitoneum as complication of peritoneal dialysis  s/p removal 9/18      AV fistula  Placed 9/18          Vital Signs Last 24 Hrs  T(C): 36.9 (17 Oct 2022 17:37), Max: 37.2 (16 Oct 2022 20:00)  T(F): 98.5 (17 Oct 2022 17:37), Max: 99 (16 Oct 2022 20:00)  HR: 73 (17 Oct 2022 17:37) (67 - 82)  BP: 132/77 (17 Oct 2022 17:37) (122/69 - 148/70)  BP(mean): --  RR: 18 (17 Oct 2022 17:37) (12 - 21)  SpO2: 97% (17 Oct 2022 17:37) (96% - 99%)    Parameters below as of 17 Oct 2022 17:37  Patient On (Oxygen Delivery Method): room air        I&O's Summary    16 Oct 2022 07:01  -  17 Oct 2022 07:00  --------------------------------------------------------  IN: 600 mL / OUT: 0 mL / NET: 600 mL    17 Oct 2022 07:01  -  17 Oct 2022 18:10  --------------------------------------------------------  IN: 0 mL / OUT: 1800 mL / NET: -1800 mL                              9.3    7.00  )-----------( 147      ( 17 Oct 2022 13:55 )             29.8     10-17    139  |  98  |  12  ----------------------------<  85  3.3<L>   |  27  |  3.30<H>    Ca    8.8      17 Oct 2022 13:55  Phos  3.9     10-17  Mg     2.4     10-17    TPro  6.5  /  Alb  3.5  /  TBili  0.8  /  DBili  0.4<H>  /  AST  19  /  ALT  14  /  AlkPhos  63  10-17          Culture - Fungal, Body Fluid (collected 10-12-22 @ 21:59)  Source: Bile Bile Fluid  Preliminary Report (10-17-22 @ 12:18):    Moderate Yeast    Culture - Body Fluid with Gram Stain (collected 10-12-22 @ 21:59)  Source: Bile Bile Fluid  Gram Stain (10-13-22 @ 04:41):    polymorphonuclear leukocytes seen    Yeast like cells seen    Gram Negative Rods seen    by cytocentrifuge  Final Report (10-17-22 @ 15:04):    Moderate Klebsiella pneumoniae    Few Gloria tropicalis "Susceptibilities not performed"  Organism: Klebsiella pneumoniae (10-17-22 @ 15:04)  Organism: Klebsiella pneumoniae (10-17-22 @ 15:04)              Review of systems  Gen: No weight changes, fatigue, fevers/chills, weakness  Skin: No rashes  Head/Eyes/Ears/Mouth: No headache; Normal hearing; Normal vision w/o blurriness; No sinus pain/discomfort, sore throat  Respiratory: No dyspnea, cough, wheezing, hemoptysis  CV: No chest pain, PND, orthopnea  GI: C/O mild abdominal pain at surgical site. no diarrhea, constipation, nausea, vomiting, melena, hematochezia  : No increased frequency, dysuria, hematuria, nocturia  MSK: No joint pain/swelling; no back pain; no edema  Neuro: No dizziness/lightheadedness, weakness, seizures, numbness, tingling  Heme: No easy bruising or bleeding  Endo: No heat/cold intolerance  Psych: No significant nervousness, anxiety, stress, depression  All other systems were reviewed and are negative, except as noted.    PHYSICAL EXAM:  Constitutional: Well developed / well nourished  Eyes: Anicteric, PERRLA  ENMT: nc/at, no thrush  Neck: supple  Respiratory: CTA B/L  Cardiovascular: RRR  Gastrointestinal: Soft abdomen, ND, appropriate incisional TTP. Abdominal dressing intact w/o drainage  Genitourinary: Voiding spontaneously  Extremities: SCD's in place and working bilaterally  Vascular: Palpable dp pulses bilaterally  Neurological: A&O x3  Skin: no rashes, ulcerations, lesions  Musculoskeletal: Moving all extremities  Psychiatric: Responsive

## 2022-10-17 NOTE — PROGRESS NOTE ADULT - SUBJECTIVE AND OBJECTIVE BOX
[facility] DIVISION OF KIDNEY DISEASES AND HYPERTENSION -- FOLLOW UP NOTE  --------------------------------------------------------------------------------  HPI: 61 yo F hx of ESRD on MWF schedule via LUE AVF, myelofibrosis, polycythemia vera, portal HTN, subclavian port for transfusions, gastric varices, gallstones, recent ERCP for choledocolithiasis s/p biliary stent, c/b post ERCP pancreatitis admitted at [facility] with fever and RUQ and epigastric pain, and fever. Nephrolo team following for ESRD/HD management.      24 hour events/subjective:  Pt. seen and examined during HD rounds today. Tolerating HD well. BP stable. Pt. denies any SOB, CP, HA, N/V, abdominal pain , urinary symptoms or dizziness. Plan for ERCP today.     PAST HISTORY  --------------------------------------------------------------------------------  No significant changes to PMH, PSH, FHx, SHx, unless otherwise noted    ALLERGIES & MEDICATIONS  --------------------------------------------------------------------------------  Allergies    No Known Allergies    Intolerances    Standing Inpatient Medications  acetaminophen     Tablet .. 650 milliGRAM(s) Oral every 6 hours  carvedilol 3.125 milliGRAM(s) Oral every 12 hours  caspofungin IVPB 50 milliGRAM(s) IV Intermittent every 24 hours  caspofungin IVPB      chlorhexidine 2% Cloths 1 Application(s) Topical daily  danazol 200 milliGRAM(s) Oral three times a day  epoetin filiberto-epbx (RETACRIT) Injectable 12472 Unit(s) IV Push <User Schedule>  gabapentin 100 milliGRAM(s) Oral daily  multivitamin 1 Tablet(s) Oral daily  Nephro-deonna 1 Tablet(s) Oral daily  piperacillin/tazobactam IVPB.. 3.375 Gram(s) IV Intermittent every 12 hours  polyethylene glycol 3350 17 Gram(s) Oral every 12 hours  ruxolitinib 10 milliGRAM(s) Oral <User Schedule>  senna 1 Tablet(s) Oral every 12 hours    PRN Inpatient Medications  cyclobenzaprine 5 milliGRAM(s) Oral three times a day PRN  ondansetron Injectable 4 milliGRAM(s) IV Push every 6 hours PRN  oxyCODONE    IR 5 milliGRAM(s) Oral every 6 hours PRN    REVIEW OF SYSTEMS  --------------------------------------------------------------------------------  Gen: No fevers   Respiratory: No dyspnea  CV: No chest pain  GI: No abdominal pain  : No dysuria  MSK: No  edema  Neuro: no dizziness   All other systems were reviewed and are negative, except as noted.    VITALS/PHYSICAL EXAM  --------------------------------------------------------------------------------  T(C): 36.8 (10-17-22 @ 05:00), Max: 37.2 (10-16-22 @ 20:00)  HR: 67 (10-17-22 @ 05:00) (63 - 71)  BP: 135/68 (10-17-22 @ 05:00) (116/76 - 141/72)  RR: 18 (10-17-22 @ 05:00) (18 - 18)  SpO2: 98% (10-17-22 @ 05:00) (96% - 99%)  Wt(kg): --    10-16-22 @ 07:01  -  10-17-22 @ 07:00  --------------------------------------------------------  IN: 600 mL / OUT: 0 mL / NET: 600 mL    Physical Exam:  	Gen: NAD, resting   	HEENT: MMM  	Pulm: CTA B/L  	CV: S1S2  	Abd: Soft, +BS   	Ext: No LE edema B/L  	Neuro: Awake  	Skin: Warm and dry  	Vascular access: LUE AVF +, palpable thrill and bruit +    LABS/STUDIES  --------------------------------------------------------------------------------              8.2    5.37  >-----------<  129      [10-17-22 @ 06:21]              26.9     139  |  102  |  35  ----------------------------<  73      [10-17-22 @ 06:21]  4.4   |  21  |  7.27        Ca     8.3     [10-17-22 @ 06:21]      Mg     2.4     [10-17-22 @ 06:21]      Phos  3.9     [10-17-22 @ 06:21]    TPro  6.5  /  Alb  3.5  /  TBili  0.8  /  DBili  0.4  /  AST  19  /  ALT  14  /  AlkPhos  63  [10-17-22 @ 06:21]    PT/INR: PT 14.0 , INR 1.21       [10-17-22 @ 06:21]  PTT: 32.6       [10-17-22 @ 06:21]    Creatinine Trend:  SCr 7.27 [10-17 @ 06:21]  SCr 6.31 [10-16 @ 06:44]  SCr 4.87 [10-15 @ 06:20]  SCr 7.37 [10-14 @ 07:36]  SCr 5.19 [10-13 @ 03:12]    Urinalysis - [09-22-22 @ 17:05]      Color Yellow / Appearance Slightly Turbid / SG 1.011 / pH 8.5      Gluc 100 mg/dL / Ketone Negative  / Bili Small / Urobili Negative       Blood Negative / Protein 300 mg/dL / Leuk Est Negative / Nitrite Negative      RBC 3 / WBC 8 / Hyaline 9 / Gran  / Sq Epi  / Non Sq Epi 9 / Bacteria Moderate    HBsAb 14.6      [09-23-22 @ 13:28]  HBsAg Nonreact      [09-23-22 @ 13:28]  HBcAb Nonreact      [09-23-22 @ 13:28]  HCV 0.12, Nonreact      [09-23-22 @ 13:28]

## 2022-10-17 NOTE — PROGRESS NOTE ADULT - ASSESSMENT
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 complicated by gastric varices, choledocholithiasis s/p ERCP 9/22 with stent and stone removal, c/b post ERCP pancreatitis re-admitted for worsening abdominal pain, now s/p open cholecystectomy with bile cxs growing Kleb pneumo and yest    [] POD 5 s/p open cholecystectomy   - Tolerating reg diet  - Pain well controlled  - ID (kleb/yeast in bile cx): cont caspo/zosyn, ID recs for DC home ( Fluconazole 200mg qd till 11/10/22, Augmentin 500mg QD till 10/28/22)   - S/P ERCP with stent removal   - SQH/SCDs/Spirometry/Out of bed   - Pain management: on Oxycodone   - Cont bowel regimen: senna/miralax, add dulcolax     [] ESRD/CKD  - Renal following; HD tomorrow

## 2022-10-17 NOTE — PROGRESS NOTE ADULT - PROBLEM SELECTOR PLAN 3
Pt with hyperphosphatemia in the setting of ESRD. Serum phosphorus within target range. Hold off phosphate binders for now. Monitor serum phosphorus.    If you have any questions, please feel free to contact me  Talon Mckeon  Nephrology Fellow  [phone]/ [employer] Teams(Preferred)  (After 5pm or on weekends please page the on-call fellow).

## 2022-10-17 NOTE — PROGRESS NOTE ADULT - ATTENDING COMMENTS
I have seen this patient with the fellow and agree with their assessment and plan. I was physically present for significant portions of the evaluation and management (E/M) service provided.  I agree with the above history, physical, and plan which I have reviewed and edited where appropriate.    pt seen on HD, tolerating well    Yael Aranda MD  Pager   Office     Contact me directly via [employer] Teams     (After 5 pm or on weekends please page the on-call fellow/attending, can check [email or web address] for schedule. Login is db [email or web address], schedule under [facility] medicine, psych, derm)

## 2022-10-17 NOTE — PROGRESS NOTE ADULT - PROBLEM SELECTOR PROBLEM 2
Anemia secondary to renal failure
Anemia secondary to renal failure
ESRD on dialysis
Anemia secondary to renal failure
ESRD on dialysis
Anemia secondary to renal failure
ESRD on dialysis
Anemia secondary to renal failure
ESRD on dialysis
Anemia secondary to renal failure
ESRD on dialysis

## 2022-10-17 NOTE — PROGRESS NOTE ADULT - SUBJECTIVE AND OBJECTIVE BOX
Follow Up:      Interval History:    REVIEW OF SYSTEMS  [  ] ROS unobtainable because:    [  ] All other systems negative except as noted below    Constitutional:  [ ] fever [ ] chills  [ ] weight loss  [ ] weakness  Skin:  [ ] rash [ ] phlebitis	  Eyes: [ ] icterus [ ] pain  [ ] discharge	  ENMT: [ ] sore throat  [ ] thrush [ ] ulcers [ ] exudates  Respiratory: [ ] dyspnea [ ] hemoptysis [ ] cough [ ] sputum	  Cardiovascular:  [ ] chest pain [ ] palpitations [ ] edema	  Gastrointestinal:  [ ] nausea [ ] vomiting [ ] diarrhea [ ] constipation [ ] pain	  Genitourinary:  [ ] dysuria [ ] frequency [ ] hematuria [ ] discharge [ ] flank pain  [ ] incontinence  Musculoskeletal:  [ ] myalgias [ ] arthralgias [ ] arthritis  [ ] back pain  Neurological:  [ ] headache [ ] seizures  [ ] confusion/altered mental status    Allergies  No Known Allergies        ANTIMICROBIALS:  caspofungin IVPB 50 every 24 hours  caspofungin IVPB    piperacillin/tazobactam IVPB.. 3.375 every 12 hours      OTHER MEDS:  MEDICATIONS  (STANDING):  acetaminophen     Tablet .. 650 every 6 hours  carvedilol 3.125 every 12 hours  cyclobenzaprine 5 three times a day PRN  danazol 200 three times a day  epoetin filiberto-epbx (RETACRIT) Injectable 40926 <User Schedule>  gabapentin 100 daily  ondansetron Injectable 4 every 6 hours PRN  oxyCODONE    IR 5 every 6 hours PRN  polyethylene glycol 3350 17 every 12 hours  ruxolitinib 10 <User Schedule>  senna 1 every 12 hours      Vital Signs Last 24 Hrs  T(C): 36.5 (17 Oct 2022 15:14), Max: 37.2 (16 Oct 2022 20:00)  T(F): 97.7 (17 Oct 2022 15:14), Max: 99 (16 Oct 2022 20:00)  HR: 75 (17 Oct 2022 15:14) (67 - 81)  BP: 130/64 (17 Oct 2022 15:14) (121/68 - 147/75)  BP(mean): --  RR: 21 (17 Oct 2022 15:14) (16 - 21)  SpO2: 98% (17 Oct 2022 15:14) (96% - 99%)    Parameters below as of 17 Oct 2022 15:14  Patient On (Oxygen Delivery Method): room air        PHYSICAL EXAMINATION:  General: Alert and Awake, NAD  HEENT: PERRL, EOMI  Neck: Supple  Cardiac: RRR, No M/R/G  Resp: CTAB, No Wh/Rh/Ra  Abdomen: NBS, NT/ND, No HSM, No rigidity or guarding  MSK: No LE edema. No Calf tenderness  : No topete  Skin: No rashes or lesions. Skin is warm and dry to the touch.   Neuro: Alert and Awake. CN 2-12 Grossly intact. Moves all four extremities spontaneously.  Psych: Calm, Pleasant, Cooperative                          9.3    7.00  )-----------( 147      ( 17 Oct 2022 13:55 )             29.8       10-17    139  |  98  |  12  ----------------------------<  85  3.3<L>   |  27  |  3.30<H>    Ca    8.8      17 Oct 2022 13:55  Phos  3.9     10-17  Mg     2.4     10-17    TPro  6.5  /  Alb  3.5  /  TBili  0.8  /  DBili  0.4<H>  /  AST  19  /  ALT  14  /  AlkPhos  63  10-17          MICROBIOLOGY:  v  Bile Bile Fluid  10-12-22   Moderate Klebsiella pneumoniae  Few Gloria tropicalis "Susceptibilities not performed"  --  Klebsiella pneumoniae      .Blood Blood-Peripheral  09-30-22   No Growth Final  --  --      .Blood Blood-Peripheral  09-30-22   No Growth Final  --  --      Clean Catch Clean Catch (Midstream)  09-22-22   <10,000 CFU/mL Normal Urogenital Caroline  --  --      .Blood Blood-Peripheral  09-22-22   No Growth Final  --  --      .Blood Blood-Peripheral  09-22-22   No Growth Final  --  --                RADIOLOGY:    <The imaging below has been reviewed and visualized by me independently. Findings as detailed in report below> Follow Up:  cholecystitis    Interval History: afebrile. no acute events.     REVIEW OF SYSTEMS  [  ] ROS unobtainable because:    [  x] All other systems negative except as noted below    Constitutional:  [ ] fever [ ] chills  [ ] weight loss  [ ] weakness  Skin:  [ ] rash [ ] phlebitis	  Eyes: [ ] icterus [ ] pain  [ ] discharge	  ENMT: [ ] sore throat  [ ] thrush [ ] ulcers [ ] exudates  Respiratory: [ ] dyspnea [ ] hemoptysis [ ] cough [ ] sputum	  Cardiovascular:  [ ] chest pain [ ] palpitations [ ] edema	  Gastrointestinal:  [ ] nausea [ ] vomiting [ ] diarrhea [ ] constipation [x ] pain	  Genitourinary:  [ ] dysuria [ ] frequency [ ] hematuria [ ] discharge [ ] flank pain  [ ] incontinence  Musculoskeletal:  [ ] myalgias [ ] arthralgias [ ] arthritis  [ ] back pain  Neurological:  [ ] headache [ ] seizures  [ ] confusion/altered mental status    Allergies  No Known Allergies        ANTIMICROBIALS:  caspofungin IVPB 50 every 24 hours  caspofungin IVPB    piperacillin/tazobactam IVPB.. 3.375 every 12 hours      OTHER MEDS:  MEDICATIONS  (STANDING):  acetaminophen     Tablet .. 650 every 6 hours  carvedilol 3.125 every 12 hours  cyclobenzaprine 5 three times a day PRN  danazol 200 three times a day  epoetin filiberto-epbx (RETACRIT) Injectable 34687 <User Schedule>  gabapentin 100 daily  ondansetron Injectable 4 every 6 hours PRN  oxyCODONE    IR 5 every 6 hours PRN  polyethylene glycol 3350 17 every 12 hours  ruxolitinib 10 <User Schedule>  senna 1 every 12 hours      Vital Signs Last 24 Hrs  T(C): 36.5 (17 Oct 2022 15:14), Max: 37.2 (16 Oct 2022 20:00)  T(F): 97.7 (17 Oct 2022 15:14), Max: 99 (16 Oct 2022 20:00)  HR: 75 (17 Oct 2022 15:14) (67 - 81)  BP: 130/64 (17 Oct 2022 15:14) (121/68 - 147/75)  BP(mean): --  RR: 21 (17 Oct 2022 15:14) (16 - 21)  SpO2: 98% (17 Oct 2022 15:14) (96% - 99%)    Parameters below as of 17 Oct 2022 15:14  Patient On (Oxygen Delivery Method): room air    PHYSICAL EXAMINATION:  General: Alert and Awake, NAD  HEENT: PERRL, EOMI,   Neck: Supple  Cardiac: RRR, No M/R/G  Resp: CTAB, No Wh/Rh/Ra  Abdomen: Dressing over RUQ. Tenderness to palpation in RUQ or RLQ. NBS.   MSK: No LE edema. No stigmata of IE. No evidence of phlebitis. No evidence of synovitis.  : No topete  Skin: No rashes or lesions. Skin is warm and dry to the touch.   Neuro: Alert and Awake. CN 2-12 Grossly intact. Moves all four extremities spontaneously.  Psych: Calm, Pleasant, Cooperative                          9.3    7.00  )-----------( 147      ( 17 Oct 2022 13:55 )             29.8       10-17    139  |  98  |  12  ----------------------------<  85  3.3<L>   |  27  |  3.30<H>    Ca    8.8      17 Oct 2022 13:55  Phos  3.9     10-17  Mg     2.4     10-17    TPro  6.5  /  Alb  3.5  /  TBili  0.8  /  DBili  0.4<H>  /  AST  19  /  ALT  14  /  AlkPhos  63  10-17          MICROBIOLOGY:  v  Bile Bile Fluid  10-12-22   Moderate Klebsiella pneumoniae  Few Gloria tropicalis "Susceptibilities not performed"  --  Klebsiella pneumoniae      .Blood Blood-Peripheral  09-30-22   No Growth Final  --  --      .Blood Blood-Peripheral  09-30-22   No Growth Final  --  --      Clean Catch Clean Catch (Midstream)  09-22-22   <10,000 CFU/mL Normal Urogenital Caroline  --  --      .Blood Blood-Peripheral  09-22-22   No Growth Final  --  --      .Blood Blood-Peripheral  09-22-22   No Growth Final  --  --    RADIOLOGY:    <The imaging below has been reviewed and visualized by me independently. Findings as detailed in report below>    < from: Xray Chest 1 View- PORTABLE-Routine (Xray Chest 1 View- PORTABLE-Routine .) (10.15.22 @ 03:22) >  IMPRESSION:  Right chest port catheter line with tip in the SVC.    < end of copied text >

## 2022-10-17 NOTE — PRE-ANESTHESIA EVALUATION ADULT - NSANTHOSAYNRD_GEN_A_CORE
No. JOHN screening performed.  STOP BANG Legend: 0-2 = LOW Risk; 3-4 = INTERMEDIATE Risk; 5-8 = HIGH Risk
No. JOHN screening performed.  STOP BANG Legend: 0-2 = LOW Risk; 3-4 = INTERMEDIATE Risk; 5-8 = HIGH Risk

## 2022-10-17 NOTE — PROGRESS NOTE ADULT - PROBLEM SELECTOR PROBLEM 1
ESRD on dialysis
ESRD on dialysis
Abdominal pain
ESRD on dialysis
Abdominal pain
ESRD on dialysis
Abdominal pain
ESRD on dialysis
ESRD on dialysis
Abdominal pain

## 2022-10-17 NOTE — PROGRESS NOTE ADULT - PROBLEM SELECTOR PLAN 2
Patient with anemia in the setting of ESRD. Hemoglobin below target range. Continue with INDER. Monitor hemoglobin.

## 2022-10-18 ENCOUNTER — TRANSCRIPTION ENCOUNTER (OUTPATIENT)
Age: 62
End: 2022-10-18

## 2022-10-18 VITALS
DIASTOLIC BLOOD PRESSURE: 61 MMHG | OXYGEN SATURATION: 97 % | HEART RATE: 73 BPM | SYSTOLIC BLOOD PRESSURE: 107 MMHG | TEMPERATURE: 99 F | RESPIRATION RATE: 18 BRPM

## 2022-10-18 LAB
ALBUMIN SERPL ELPH-MCNC: 3.4 G/DL — SIGNIFICANT CHANGE UP (ref 3.3–5)
ALP SERPL-CCNC: 72 U/L — SIGNIFICANT CHANGE UP (ref 40–120)
ALT FLD-CCNC: 20 U/L — SIGNIFICANT CHANGE UP (ref 10–45)
AMYLASE P1 CFR SERPL: 30 U/L — SIGNIFICANT CHANGE UP (ref 25–125)
ANION GAP SERPL CALC-SCNC: 14 MMOL/L — SIGNIFICANT CHANGE UP (ref 5–17)
APTT BLD: 33.1 SEC — SIGNIFICANT CHANGE UP (ref 27.5–35.5)
AST SERPL-CCNC: 23 U/L — SIGNIFICANT CHANGE UP (ref 10–40)
BASOPHILS # BLD AUTO: 0 K/UL — SIGNIFICANT CHANGE UP (ref 0–0.2)
BASOPHILS NFR BLD AUTO: 0 % — SIGNIFICANT CHANGE UP (ref 0–2)
BILIRUB DIRECT SERPL-MCNC: 0.3 MG/DL — SIGNIFICANT CHANGE UP (ref 0–0.3)
BILIRUB INDIRECT FLD-MCNC: 0.4 MG/DL — SIGNIFICANT CHANGE UP (ref 0.2–1)
BILIRUB SERPL-MCNC: 0.7 MG/DL — SIGNIFICANT CHANGE UP (ref 0.2–1.2)
BUN SERPL-MCNC: 22 MG/DL — SIGNIFICANT CHANGE UP (ref 7–23)
CALCIUM SERPL-MCNC: 8.2 MG/DL — LOW (ref 8.4–10.5)
CHLORIDE SERPL-SCNC: 98 MMOL/L — SIGNIFICANT CHANGE UP (ref 96–108)
CO2 SERPL-SCNC: 24 MMOL/L — SIGNIFICANT CHANGE UP (ref 22–31)
CREAT SERPL-MCNC: 5.27 MG/DL — HIGH (ref 0.5–1.3)
EGFR: 9 ML/MIN/1.73M2 — LOW
EOSINOPHIL # BLD AUTO: 0 K/UL — SIGNIFICANT CHANGE UP (ref 0–0.5)
EOSINOPHIL NFR BLD AUTO: 0 % — SIGNIFICANT CHANGE UP (ref 0–6)
GLUCOSE SERPL-MCNC: 253 MG/DL — HIGH (ref 70–99)
HCT VFR BLD CALC: 26.9 % — LOW (ref 34.5–45)
HGB BLD-MCNC: 8.3 G/DL — LOW (ref 11.5–15.5)
INR BLD: 1.23 RATIO — HIGH (ref 0.88–1.16)
LIDOCAIN IGE QN: 33 U/L — SIGNIFICANT CHANGE UP (ref 7–60)
LYMPHOCYTES # BLD AUTO: 0.31 K/UL — LOW (ref 1–3.3)
LYMPHOCYTES # BLD AUTO: 4.4 % — LOW (ref 13–44)
MAGNESIUM SERPL-MCNC: 2.2 MG/DL — SIGNIFICANT CHANGE UP (ref 1.6–2.6)
MCHC RBC-ENTMCNC: 30 PG — SIGNIFICANT CHANGE UP (ref 27–34)
MCHC RBC-ENTMCNC: 30.9 GM/DL — LOW (ref 32–36)
MCV RBC AUTO: 97.1 FL — SIGNIFICANT CHANGE UP (ref 80–100)
MONOCYTES # BLD AUTO: 0.19 K/UL — SIGNIFICANT CHANGE UP (ref 0–0.9)
MONOCYTES NFR BLD AUTO: 2.6 % — SIGNIFICANT CHANGE UP (ref 2–14)
NEUTROPHILS # BLD AUTO: 6.59 K/UL — SIGNIFICANT CHANGE UP (ref 1.8–7.4)
NEUTROPHILS NFR BLD AUTO: 89.5 % — HIGH (ref 43–77)
PHOSPHATE SERPL-MCNC: 1.9 MG/DL — LOW (ref 2.5–4.5)
PLATELET # BLD AUTO: 140 K/UL — LOW (ref 150–400)
POTASSIUM SERPL-MCNC: 4.5 MMOL/L — SIGNIFICANT CHANGE UP (ref 3.5–5.3)
POTASSIUM SERPL-SCNC: 4.5 MMOL/L — SIGNIFICANT CHANGE UP (ref 3.5–5.3)
PROT SERPL-MCNC: 6.6 G/DL — SIGNIFICANT CHANGE UP (ref 6–8.3)
PROTHROM AB SERPL-ACNC: 14.3 SEC — HIGH (ref 10.5–13.4)
RBC # BLD: 2.77 M/UL — LOW (ref 3.8–5.2)
RBC # FLD: 19.4 % — HIGH (ref 10.3–14.5)
SODIUM SERPL-SCNC: 136 MMOL/L — SIGNIFICANT CHANGE UP (ref 135–145)
WBC # BLD: 7.15 K/UL — SIGNIFICANT CHANGE UP (ref 3.8–10.5)
WBC # FLD AUTO: 7.15 K/UL — SIGNIFICANT CHANGE UP (ref 3.8–10.5)

## 2022-10-18 PROCEDURE — 87640 STAPH A DNA AMP PROBE: CPT

## 2022-10-18 PROCEDURE — 74330 X-RAY BILE/PANC ENDOSCOPY: CPT

## 2022-10-18 PROCEDURE — 76705 ECHO EXAM OF ABDOMEN: CPT

## 2022-10-18 PROCEDURE — 87637 SARSCOV2&INF A&B&RSV AMP PRB: CPT

## 2022-10-18 PROCEDURE — 78227 HEPATOBIL SYST IMAGE W/DRUG: CPT

## 2022-10-18 PROCEDURE — P9040: CPT

## 2022-10-18 PROCEDURE — 93356 MYOCRD STRAIN IMG SPCKL TRCK: CPT

## 2022-10-18 PROCEDURE — 83735 ASSAY OF MAGNESIUM: CPT

## 2022-10-18 PROCEDURE — 36430 TRANSFUSION BLD/BLD COMPNT: CPT

## 2022-10-18 PROCEDURE — 85018 HEMOGLOBIN: CPT

## 2022-10-18 PROCEDURE — C9399: CPT

## 2022-10-18 PROCEDURE — 36415 COLL VENOUS BLD VENIPUNCTURE: CPT

## 2022-10-18 PROCEDURE — 87070 CULTURE OTHR SPECIMN AEROBIC: CPT

## 2022-10-18 PROCEDURE — 87186 SC STD MICRODIL/AGAR DIL: CPT

## 2022-10-18 PROCEDURE — 88304 TISSUE EXAM BY PATHOLOGIST: CPT

## 2022-10-18 PROCEDURE — 71045 X-RAY EXAM CHEST 1 VIEW: CPT

## 2022-10-18 PROCEDURE — 85730 THROMBOPLASTIN TIME PARTIAL: CPT

## 2022-10-18 PROCEDURE — U0003: CPT

## 2022-10-18 PROCEDURE — 87077 CULTURE AEROBIC IDENTIFY: CPT

## 2022-10-18 PROCEDURE — 87641 MR-STAPH DNA AMP PROBE: CPT

## 2022-10-18 PROCEDURE — 86850 RBC ANTIBODY SCREEN: CPT

## 2022-10-18 PROCEDURE — 87040 BLOOD CULTURE FOR BACTERIA: CPT

## 2022-10-18 PROCEDURE — 84132 ASSAY OF SERUM POTASSIUM: CPT

## 2022-10-18 PROCEDURE — 74177 CT ABD & PELVIS W/CONTRAST: CPT | Mod: MA

## 2022-10-18 PROCEDURE — 80076 HEPATIC FUNCTION PANEL: CPT

## 2022-10-18 PROCEDURE — 82150 ASSAY OF AMYLASE: CPT

## 2022-10-18 PROCEDURE — 82803 BLOOD GASES ANY COMBINATION: CPT

## 2022-10-18 PROCEDURE — 82565 ASSAY OF CREATININE: CPT

## 2022-10-18 PROCEDURE — 87205 SMEAR GRAM STAIN: CPT

## 2022-10-18 PROCEDURE — 82435 ASSAY OF BLOOD CHLORIDE: CPT

## 2022-10-18 PROCEDURE — 84295 ASSAY OF SERUM SODIUM: CPT

## 2022-10-18 PROCEDURE — 85025 COMPLETE CBC W/AUTO DIFF WBC: CPT

## 2022-10-18 PROCEDURE — 99285 EMERGENCY DEPT VISIT HI MDM: CPT

## 2022-10-18 PROCEDURE — 82947 ASSAY GLUCOSE BLOOD QUANT: CPT

## 2022-10-18 PROCEDURE — A9537: CPT

## 2022-10-18 PROCEDURE — 82330 ASSAY OF CALCIUM: CPT

## 2022-10-18 PROCEDURE — 86900 BLOOD TYPING SEROLOGIC ABO: CPT

## 2022-10-18 PROCEDURE — 96374 THER/PROPH/DIAG INJ IV PUSH: CPT

## 2022-10-18 PROCEDURE — 82962 GLUCOSE BLOOD TEST: CPT

## 2022-10-18 PROCEDURE — 74018 RADEX ABDOMEN 1 VIEW: CPT

## 2022-10-18 PROCEDURE — 93975 VASCULAR STUDY: CPT

## 2022-10-18 PROCEDURE — 99261: CPT

## 2022-10-18 PROCEDURE — 85610 PROTHROMBIN TIME: CPT

## 2022-10-18 PROCEDURE — 85014 HEMATOCRIT: CPT

## 2022-10-18 PROCEDURE — 87075 CULTR BACTERIA EXCEPT BLOOD: CPT

## 2022-10-18 PROCEDURE — 80048 BASIC METABOLIC PNL TOTAL CA: CPT

## 2022-10-18 PROCEDURE — 74183 MRI ABD W/O CNTR FLWD CNTR: CPT

## 2022-10-18 PROCEDURE — 87102 FUNGUS ISOLATION CULTURE: CPT

## 2022-10-18 PROCEDURE — C1769: CPT

## 2022-10-18 PROCEDURE — 96375 TX/PRO/DX INJ NEW DRUG ADDON: CPT

## 2022-10-18 PROCEDURE — 86901 BLOOD TYPING SEROLOGIC RH(D): CPT

## 2022-10-18 PROCEDURE — 86922 COMPATIBILITY TEST ANTIGLOB: CPT

## 2022-10-18 PROCEDURE — A9585: CPT

## 2022-10-18 PROCEDURE — U0005: CPT

## 2022-10-18 PROCEDURE — 83690 ASSAY OF LIPASE: CPT

## 2022-10-18 PROCEDURE — 80053 COMPREHEN METABOLIC PANEL: CPT

## 2022-10-18 PROCEDURE — 83605 ASSAY OF LACTIC ACID: CPT

## 2022-10-18 PROCEDURE — 85027 COMPLETE CBC AUTOMATED: CPT

## 2022-10-18 PROCEDURE — 97161 PT EVAL LOW COMPLEX 20 MIN: CPT

## 2022-10-18 PROCEDURE — 93306 TTE W/DOPPLER COMPLETE: CPT

## 2022-10-18 PROCEDURE — 76700 US EXAM ABDOM COMPLETE: CPT

## 2022-10-18 PROCEDURE — 84100 ASSAY OF PHOSPHORUS: CPT

## 2022-10-18 PROCEDURE — 86902 BLOOD TYPE ANTIGEN DONOR EA: CPT

## 2022-10-18 RX ORDER — PANTOPRAZOLE SODIUM 20 MG/1
1 TABLET, DELAYED RELEASE ORAL
Qty: 30 | Refills: 0
Start: 2022-10-18 | End: 2022-11-16

## 2022-10-18 RX ORDER — SENNA PLUS 8.6 MG/1
1 TABLET ORAL
Qty: 60 | Refills: 0
Start: 2022-10-18 | End: 2022-11-16

## 2022-10-18 RX ORDER — PANTOPRAZOLE SODIUM 20 MG/1
40 TABLET, DELAYED RELEASE ORAL DAILY
Refills: 0 | Status: DISCONTINUED | OUTPATIENT
Start: 2022-10-18 | End: 2022-10-18

## 2022-10-18 RX ORDER — DANAZOL 200 MG/1
1 CAPSULE ORAL
Qty: 90 | Refills: 0
Start: 2022-10-18 | End: 2022-11-16

## 2022-10-18 RX ORDER — CYCLOBENZAPRINE HYDROCHLORIDE 10 MG/1
1 TABLET, FILM COATED ORAL
Qty: 90 | Refills: 0
Start: 2022-10-18 | End: 2022-11-16

## 2022-10-18 RX ORDER — FLUCONAZOLE 150 MG/1
1 TABLET ORAL
Qty: 23 | Refills: 0
Start: 2022-10-18 | End: 2022-11-09

## 2022-10-18 RX ORDER — POLYETHYLENE GLYCOL 3350 17 G/17G
17 POWDER, FOR SOLUTION ORAL
Qty: 1020 | Refills: 0
Start: 2022-10-18 | End: 2022-11-16

## 2022-10-18 RX ORDER — GABAPENTIN 400 MG/1
1 CAPSULE ORAL
Qty: 30 | Refills: 0
Start: 2022-10-18 | End: 2022-11-16

## 2022-10-18 RX ADMIN — DANAZOL 200 MILLIGRAM(S): 200 CAPSULE ORAL at 12:58

## 2022-10-18 RX ADMIN — Medication 1 TABLET(S): at 12:58

## 2022-10-18 RX ADMIN — CHLORHEXIDINE GLUCONATE 1 APPLICATION(S): 213 SOLUTION TOPICAL at 13:02

## 2022-10-18 RX ADMIN — GABAPENTIN 100 MILLIGRAM(S): 400 CAPSULE ORAL at 12:58

## 2022-10-18 RX ADMIN — DANAZOL 200 MILLIGRAM(S): 200 CAPSULE ORAL at 05:25

## 2022-10-18 RX ADMIN — CASPOFUNGIN ACETATE 260 MILLIGRAM(S): 7 INJECTION, POWDER, LYOPHILIZED, FOR SOLUTION INTRAVENOUS at 12:58

## 2022-10-18 RX ADMIN — CARVEDILOL PHOSPHATE 3.12 MILLIGRAM(S): 80 CAPSULE, EXTENDED RELEASE ORAL at 05:25

## 2022-10-18 RX ADMIN — Medication 650 MILLIGRAM(S): at 05:25

## 2022-10-18 NOTE — PROGRESS NOTE ADULT - SUBJECTIVE AND OBJECTIVE BOX
Transplant Surgery Progress Note   --------------------------------------------------------------  open cholecystectomy   DATE 10/12/22   POD #6    Present:   Patient seen and examined with Dr. Dagher, Dr. Singh, NP Aaron, pharmacist HALLE Decker and unit RN during am rounds. Disciplines not in attendance will be notified of the plan.     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     s/p open cholecystectomy, Found to have purulent cholecystitis.  Bile cxs growing Kleb pneumo and yeast (candida), on Caspo and zosyn     Interval Events:  - S/P ERCP stent removal. Amylase/lipase normal  - + BM.  Tolerating regular diet  - HD yesterday    Potential Discharge date: today   Education:  Medications  Plan of care:  See Below         MEDICATIONS  (STANDING):  acetaminophen     Tablet .. 650 milliGRAM(s) Oral every 6 hours  calcium carbonate    500 mG (Tums) Chewable 2 Tablet(s) Chew once  carvedilol 3.125 milliGRAM(s) Oral every 12 hours  caspofungin IVPB      caspofungin IVPB 50 milliGRAM(s) IV Intermittent every 24 hours  chlorhexidine 2% Cloths 1 Application(s) Topical daily  danazol 200 milliGRAM(s) Oral three times a day  epoetin filiberto-epbx (RETACRIT) Injectable 75972 Unit(s) IV Push <User Schedule>  gabapentin 100 milliGRAM(s) Oral daily  indomethacin Suppository 100 milliGRAM(s) Rectal once  multivitamin 1 Tablet(s) Oral daily  Nephro-deonna 1 Tablet(s) Oral daily  oxyCODONE    IR 5 milliGRAM(s) Oral once  piperacillin/tazobactam IVPB.. 3.375 Gram(s) IV Intermittent every 12 hours  polyethylene glycol 3350 17 Gram(s) Oral every 12 hours  ruxolitinib 10 milliGRAM(s) Oral <User Schedule>  senna 1 Tablet(s) Oral every 12 hours    MEDICATIONS  (PRN):  cyclobenzaprine 5 milliGRAM(s) Oral three times a day PRN Muscle Spasm  ondansetron Injectable 4 milliGRAM(s) IV Push every 6 hours PRN Nausea and/or Vomiting  oxyCODONE    IR 5 milliGRAM(s) Oral every 6 hours PRN Moderate Pain (4 - 6)      PAST MEDICAL & SURGICAL HISTORY:  Polycythemia vera  and secondary myelofibrosis      Peptic ulcer disease      Hypertension      Splenomegaly  2015      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      Pancreatic cyst      History of myelofibrosis      History of myelofibrosis      Peritoneal dialysis catheter in place  Placed 8/9/2017      Hemoperitoneum as complication of peritoneal dialysis  s/p removal 9/18      AV fistula  Placed 9/18          Vital Signs Last 24 Hrs  T(C): 36.8 (18 Oct 2022 08:59), Max: 37.3 (17 Oct 2022 21:16)  T(F): 98.3 (18 Oct 2022 08:59), Max: 99.1 (17 Oct 2022 21:16)  HR: 65 (18 Oct 2022 08:59) (65 - 83)  BP: 102/64 (18 Oct 2022 08:59) (102/64 - 148/70)  BP(mean): --  RR: 18 (18 Oct 2022 08:59) (12 - 21)  SpO2: 97% (18 Oct 2022 08:59) (96% - 98%)    Parameters below as of 18 Oct 2022 08:59  Patient On (Oxygen Delivery Method): room air        I&O's Summary    17 Oct 2022 07:01  -  18 Oct 2022 07:00  --------------------------------------------------------  IN: 240 mL / OUT: 1800 mL / NET: -1560 mL    18 Oct 2022 07:01  -  18 Oct 2022 13:38  --------------------------------------------------------  IN: 480 mL / OUT: 0 mL / NET: 480 mL                              8.3    7.15  )-----------( 140      ( 18 Oct 2022 07:03 )             26.9     10-18    136  |  98  |  22  ----------------------------<  253<H>  4.5   |  24  |  5.27<H>    Ca    8.2<L>      18 Oct 2022 07:05  Phos  1.9     10-18  Mg     2.2     10-18    TPro  6.6  /  Alb  3.4  /  TBili  0.7  /  DBili  0.3  /  AST  23  /  ALT  20  /  AlkPhos  72  10-18          Culture - Fungal, Body Fluid (collected 10-12-22 @ 21:59)  Source: Bile Bile Fluid  Preliminary Report (10-18-22 @ 10:57):    Moderate Candida tropicalis    Culture - Body Fluid with Gram Stain (collected 10-12-22 @ 21:59)  Source: Bile Bile Fluid  Gram Stain (10-13-22 @ 04:41):    polymorphonuclear leukocytes seen    Yeast like cells seen    Gram Negative Rods seen    by cytocentrifuge  Final Report (10-17-22 @ 15:04):    Moderate Klebsiella pneumoniae    Few Gloria tropicalis "Susceptibilities not performed"  Organism: Klebsiella pneumoniae (10-17-22 @ 15:04)  Organism: Klebsiella pneumoniae (10-17-22 @ 15:04)          Review of systems  Gen: No weight changes, fatigue, fevers/chills, weakness  Skin: No rashes  Head/Eyes/Ears/Mouth: No headache; Normal hearing; Normal vision w/o blurriness; No sinus pain/discomfort, sore throat  Respiratory: No dyspnea, cough, wheezing, hemoptysis  CV: No chest pain, PND, orthopnea  GI: C/O mild abdominal pain at surgical site. no diarrhea, constipation, nausea, vomiting, melena, hematochezia  : No increased frequency, dysuria, hematuria, nocturia  MSK: No joint pain/swelling; no back pain; no edema  Neuro: No dizziness/lightheadedness, weakness, seizures, numbness, tingling  Heme: No easy bruising or bleeding  Endo: No heat/cold intolerance  Psych: No significant nervousness, anxiety, stress, depression  All other systems were reviewed and are negative, except as noted.    PHYSICAL EXAM:  Constitutional: Well developed / well nourished  Eyes: Anicteric, PERRLA  ENMT: nc/at, no thrush  Neck: supple  Respiratory: CTA B/L  Cardiovascular: RRR  Gastrointestinal: Soft abdomen, ND, appropriate incisional TTP. Abdominal dressing intact w/o drainage  Genitourinary: Voiding spontaneously  Extremities: SCD's in place and working bilaterally  Vascular: Palpable dp pulses bilaterally  Neurological: A&O x3  Skin: no rashes, ulcerations, lesions  Musculoskeletal: Moving all extremities  Psychiatric: Responsive     Transplant Surgery Progress Note   --------------------------------------------------------------  open cholecystectomy   DATE 10/12/22   POD #6    Present:   Patient seen and examined with Dr. Dagher, Dr. Singh, NP Aaron, pharmacist HALLE Decker and unit RN during am rounds. Disciplines not in attendance will be notified of the plan.     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     s/p open cholecystectomy, Found to have purulent cholecystitis.  Bile cxs growing Kleb pneumo and yeast (candida), on Caspo and zosyn     Interval Events:  - S/P ERCP stent removal. Amylase/lipase normal  - + BM.  Tolerating regular diet  - HD yesterday    Potential Discharge date: today   Education:  Medications  Plan of care:  See Below         MEDICATIONS  (STANDING):  acetaminophen     Tablet .. 650 milliGRAM(s) Oral every 6 hours  calcium carbonate    500 mG (Tums) Chewable 2 Tablet(s) Chew once  carvedilol 3.125 milliGRAM(s) Oral every 12 hours  caspofungin IVPB      caspofungin IVPB 50 milliGRAM(s) IV Intermittent every 24 hours  chlorhexidine 2% Cloths 1 Application(s) Topical daily  danazol 200 milliGRAM(s) Oral three times a day  epoetin filiberto-epbx (RETACRIT) Injectable 06600 Unit(s) IV Push <User Schedule>  gabapentin 100 milliGRAM(s) Oral daily  indomethacin Suppository 100 milliGRAM(s) Rectal once  multivitamin 1 Tablet(s) Oral daily  Nephro-deonna 1 Tablet(s) Oral daily  oxyCODONE    IR 5 milliGRAM(s) Oral once  piperacillin/tazobactam IVPB.. 3.375 Gram(s) IV Intermittent every 12 hours  polyethylene glycol 3350 17 Gram(s) Oral every 12 hours  ruxolitinib 10 milliGRAM(s) Oral <User Schedule>  senna 1 Tablet(s) Oral every 12 hours    MEDICATIONS  (PRN):  cyclobenzaprine 5 milliGRAM(s) Oral three times a day PRN Muscle Spasm  ondansetron Injectable 4 milliGRAM(s) IV Push every 6 hours PRN Nausea and/or Vomiting  oxyCODONE    IR 5 milliGRAM(s) Oral every 6 hours PRN Moderate Pain (4 - 6)      PAST MEDICAL & SURGICAL HISTORY:  Polycythemia vera  and secondary myelofibrosis      Peptic ulcer disease      Hypertension      Splenomegaly  2015      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      Pancreatic cyst      History of myelofibrosis      History of myelofibrosis      Peritoneal dialysis catheter in place  Placed 8/9/2017      Hemoperitoneum as complication of peritoneal dialysis  s/p removal 9/18      AV fistula  Placed 9/18          Vital Signs Last 24 Hrs  T(C): 36.8 (18 Oct 2022 08:59), Max: 37.3 (17 Oct 2022 21:16)  T(F): 98.3 (18 Oct 2022 08:59), Max: 99.1 (17 Oct 2022 21:16)  HR: 65 (18 Oct 2022 08:59) (65 - 83)  BP: 102/64 (18 Oct 2022 08:59) (102/64 - 148/70)  BP(mean): --  RR: 18 (18 Oct 2022 08:59) (12 - 21)  SpO2: 97% (18 Oct 2022 08:59) (96% - 98%)    Parameters below as of 18 Oct 2022 08:59  Patient On (Oxygen Delivery Method): room air        I&O's Summary    17 Oct 2022 07:01  -  18 Oct 2022 07:00  --------------------------------------------------------  IN: 240 mL / OUT: 1800 mL / NET: -1560 mL    18 Oct 2022 07:01  -  18 Oct 2022 13:38  --------------------------------------------------------  IN: 480 mL / OUT: 0 mL / NET: 480 mL                              8.3    7.15  )-----------( 140      ( 18 Oct 2022 07:03 )             26.9     10-18    136  |  98  |  22  ----------------------------<  253<H>  4.5   |  24  |  5.27<H>    Ca    8.2<L>      18 Oct 2022 07:05  Phos  1.9     10-18  Mg     2.2     10-18    TPro  6.6  /  Alb  3.4  /  TBili  0.7  /  DBili  0.3  /  AST  23  /  ALT  20  /  AlkPhos  72  10-18          Culture - Fungal, Body Fluid (collected 10-12-22 @ 21:59)  Source: Bile Bile Fluid  Preliminary Report (10-18-22 @ 10:57):    Moderate Candida tropicalis    Culture - Body Fluid with Gram Stain (collected 10-12-22 @ 21:59)  Source: Bile Bile Fluid  Gram Stain (10-13-22 @ 04:41):    polymorphonuclear leukocytes seen    Yeast like cells seen    Gram Negative Rods seen    by cytocentrifuge  Final Report (10-17-22 @ 15:04):    Moderate Klebsiella pneumoniae    Few Gloria tropicalis "Susceptibilities not performed"  Organism: Klebsiella pneumoniae (10-17-22 @ 15:04)  Organism: Klebsiella pneumoniae (10-17-22 @ 15:04)          Review of systems  Gen: No weight changes, fatigue, fevers/chills, weakness  Skin: No rashes  Head/Eyes/Ears/Mouth: No headache; Normal hearing; Normal vision w/o blurriness; No sinus pain/discomfort, sore throat  Respiratory: No dyspnea, cough, wheezing, hemoptysis  CV: No chest pain, PND, orthopnea  GI: C/O mild abdominal pain at surgical site. no diarrhea, constipation, nausea, vomiting, melena, hematochezia  : No increased frequency, dysuria, hematuria, nocturia  MSK: No joint pain/swelling; no back pain; no edema  Neuro: No dizziness/lightheadedness, weakness, seizures, numbness, tingling  Heme: No easy bruising or bleeding  Endo: No heat/cold intolerance  Psych: No significant nervousness, anxiety, stress, depression  All other systems were reviewed and are negative, except as noted.    PHYSICAL EXAM:  Constitutional: Well developed / well nourished  Eyes: Anicteric, PERRLA  ENMT: nc/at, no thrush  Neck: supple  Respiratory: CTA B/L  Cardiovascular: RRR  Gastrointestinal: Soft abdomen, ND, appropriate incisional TTP. Abdominal dressing intact w/o drainage  Genitourinary: Voiding spontaneously  Extremities: SCD's in place and working bilaterally  Vascular: Palpable dp pulses bilaterally  Neurological: A&O x3  Skin: no rashes, ulcerations, lesions  Musculoskeletal: Moving all extremities  Psychiatric: Responsive

## 2022-10-18 NOTE — PROGRESS NOTE ADULT - ASSESSMENT
62 year old female with history of myelofibrosis, polycythemia vera, ESRD on MWF schedule via LUE AVF (last dialysis W), portal HTN, subclavian port for transfusions, gastric varices, gallstones, ERCP 9/22/2022 for sludge and stones s/p metal biliary stent c/b post ERCP pancreatitis who presents with fever and RUQ and epigastric pain.     # Acute cholecystitis s/p CCY on 10/12/2022  # History of biliary sludge and stones s/p 10mm x 4cm covered metal stent in CBD placed 9/22/2022 with removal 10/17/22  # [id number]    Recommendations:  -trend clinical symptoms, exam findings, vital signs, CBC, CMP, INR  -s/p ERCP stent removal 10/17      Note incomplete until finalized by attending signature/attestation.    Lacey Sandoval MD  GI Fellow, PGY-5  Available via [employer] Teams    NON-URGENT CONSULTS:  Please email [email or web address]  AT NIGHT AND ON WEEKENDS:  Contact on-call GI fellow via answering service ([phone]) from 5pm-8am and on weekends/holidays  MONDAY-FRIDAY 8AM-5PM:  Pager# [phone] ([facility]) or [phone] ([facility])  GI Phone# [phone] ([facility])   62 year old female with history of myelofibrosis, polycythemia vera, ESRD on MWF schedule via LUE AVF (last dialysis W), portal HTN, subclavian port for transfusions, gastric varices, gallstones, ERCP 9/22/2022 for sludge and stones s/p metal biliary stent c/b post ERCP pancreatitis who presents with fever and RUQ and epigastric pain.     # Acute cholecystitis s/p CCY on 10/12/2022  # History of biliary sludge and stones s/p 10mm x 4cm covered metal stent in CBD placed 9/22/2022 with removal 10/17/22  # [id number]    Recommendations:  -trend clinical symptoms, exam findings, vital signs, CBC, CMP, INR  -s/p ERCP stent removal 10/17  -rest of care per primary team    We will sign off at this time. Please reconsult/page if questions.      Note incomplete until finalized by attending signature/attestation.    Lacey Sandoval MD  GI Fellow, PGY-5  Available via [employer] Teams    NON-URGENT CONSULTS:  Please email [email or web address]  AT NIGHT AND ON WEEKENDS:  Contact on-call GI fellow via answering service ([phone]) from 5pm-8am and on weekends/holidays  MONDAY-FRIDAY 8AM-5PM:  Pager# [phone] ([facility]) or [phone] ([facility])  GI Phone# [phone] ([facility])

## 2022-10-18 NOTE — DISCHARGE NOTE NURSING/CASE MANAGEMENT/SOCIAL WORK - PATIENT PORTAL LINK FT
You can access the FollowMyHealth Patient Portal offered by [facility] by registering at the following website: [email or web address]. By joining [employer]’s FollowMyHealth portal, you will also be able to view your health information using other applications (apps) compatible with our system.

## 2022-10-18 NOTE — PROGRESS NOTE ADULT - ASSESSMENT
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 Regenerative Hyperplasia complicated by gastric varices, choledocholithiasis s/p ERCP 9/22 with stent and stone removal, c/b post ERCP pancreatitis re-admitted for worsening abdominal pain, now s/p open cholecystectomy with bile cxs growing Kleb pneumo and yest    [] POD 6 s/p open cholecystectomy   - Tolerating reg diet  - Pain well controlled  - ID (kleb/yeast in bile cx): cont caspo/zosyn, ID recs for DC home ( Fluconazole 200mg qd till 11/10/22, Augmentin 500mg QD till 10/28/22)   - S/P ERCP with stent removal   - SQH/SCDs/Spirometry/Out of bed   - Pain management: on Oxycodone   - Cont bowel regimen: senna/miralax, add dulcolax     [] ESRD/CKD  - Renal following; HD as per nephrology    DISPO: DC to home today

## 2022-10-18 NOTE — PROGRESS NOTE ADULT - PROVIDER SPECIALTY LIST ADULT
Internal Medicine
Hepatology
Hepatology
Infectious Disease
Internal Medicine
Nephrology
Transplant Surgery
Gastroenterology
Gastroenterology
Hepatology
Infectious Disease
Transplant Surgery
Heme/Onc
Heme/Onc
Hepatology
Transplant Surgery
Trauma Surgery
Internal Medicine
Nephrology
Nephrology
Internal Medicine
Nephrology
Nephrology
Internal Medicine
Nephrology
Internal Medicine
Nephrology
Internal Medicine

## 2022-10-18 NOTE — PROGRESS NOTE ADULT - NS ATTEND AMEND GEN_ALL_CORE FT
ERCP planning  HD   dispo soon.
Feels better. bowel function improving.  ERCP tomorrow for stent removal.
Risks of surgery again discussed and informed consent obtained.  To OR for open cholecystectomy.
All in all doing well.  Some colonic distention but good bowel sounds.  Advance diet.  Labs look good.  Needs to ambulate/IS.  CBD stent likely out monday.
POD 1 open vikas.  doing well.  Needs improved pain control.  Advance diet as tolerated.  Will need CBD stent removed.  will contact GI.
bili panc s/p ERCP with stent.  Mild RUQ pain on exam.  Labs look good.  Will preop and proceed with cholecystectomy tomorrow.  Will proceed with open approach given massive splenomegally and varices.
doing well. tolerating diet.   ERCP w stent removal yesterday.   Had HD  stable for DC
Improving.  Labs look good.  watch INR.  ERCP monday for stent removal.

## 2022-10-18 NOTE — PROGRESS NOTE ADULT - SUBJECTIVE AND OBJECTIVE BOX
Chief Complaint:  Patient is a 62y old  Female who presents with a chief complaint of Abdominal pain (17 Oct 2022 18:08)      Interval Events: s/p successful biliary stent removal during ERCP yesterday. Hgb 8.3 today from 9.3 yesterday.      Hospital Medications:  acetaminophen     Tablet .. 650 milliGRAM(s) Oral every 6 hours  calcium carbonate    500 mG (Tums) Chewable 2 Tablet(s) Chew once  carvedilol 3.125 milliGRAM(s) Oral every 12 hours  caspofungin IVPB      caspofungin IVPB 50 milliGRAM(s) IV Intermittent every 24 hours  chlorhexidine 2% Cloths 1 Application(s) Topical daily  cyclobenzaprine 5 milliGRAM(s) Oral three times a day PRN  danazol 200 milliGRAM(s) Oral three times a day  epoetin filiberto-epbx (RETACRIT) Injectable 66930 Unit(s) IV Push <User Schedule>  gabapentin 100 milliGRAM(s) Oral daily  indomethacin Suppository 100 milliGRAM(s) Rectal once  multivitamin 1 Tablet(s) Oral daily  Nephro-deonna 1 Tablet(s) Oral daily  ondansetron Injectable 4 milliGRAM(s) IV Push every 6 hours PRN  oxyCODONE    IR 5 milliGRAM(s) Oral once  oxyCODONE    IR 5 milliGRAM(s) Oral every 6 hours PRN  piperacillin/tazobactam IVPB.. 3.375 Gram(s) IV Intermittent every 12 hours  polyethylene glycol 3350 17 Gram(s) Oral every 12 hours  ruxolitinib 10 milliGRAM(s) Oral <User Schedule>  senna 1 Tablet(s) Oral every 12 hours      PMHX/PSHX:  Polycythemia vera    Peptic ulcer disease    Hypertension    Splenomegaly    Splenic infarct    ESRD on peritoneal dialysis    Cirrhosis of liver without ascites, unspecified hepatic cirrhosis type    Pancreatic cyst    History of myelofibrosis    History of myelofibrosis    No significant past surgical history    Peritoneal dialysis catheter in place    Hemoperitoneum as complication of peritoneal dialysis    AV fistula            ROS: 14 point ROS negative unless otherwise stated in subjective      PHYSICAL EXAM:     GENERAL:  Well developed, no distress  HEENT:  NC/AT,  conjunctivae clear, sclera anicteric  CHEST:  Full & symmetric excursion, no increased effort w/ respirations  HEART:  Regular rhythm & rate  ABDOMEN:  Soft, non-tender, non-distended  EXTREMITIES:  no LE  edema  SKIN:  No rash/erythema/ecchymoses/petechiae/wounds/jaundice  NEURO:  Alert, orientedx3    Vital Signs:  Vital Signs Last 24 Hrs  T(C): 37.1 (18 Oct 2022 05:34), Max: 37.3 (17 Oct 2022 21:16)  T(F): 98.7 (18 Oct 2022 05:34), Max: 99.1 (17 Oct 2022 21:16)  HR: 69 (18 Oct 2022 05:34) (68 - 83)  BP: 113/71 (18 Oct 2022 05:34) (108/63 - 148/70)  BP(mean): --  RR: 18 (18 Oct 2022 05:34) (12 - 21)  SpO2: 97% (18 Oct 2022 05:34) (96% - 98%)    Parameters below as of 18 Oct 2022 05:34  Patient On (Oxygen Delivery Method): room air      Daily Height in cm: 157.48 (17 Oct 2022 15:01)    Daily Weight in k.9 (17 Oct 2022 11:45)    LABS:                        8.3    7.15  )-----------( 140      ( 18 Oct 2022 07:03 )             26.9     10-18    136  |  98  |  22  ----------------------------<  253<H>  4.5   |  24  |  5.27<H>    Ca    8.2<L>      18 Oct 2022 07:05  Phos  1.9     10-18  Mg     2.2     10-18    TPro  6.6  /  Alb  3.4  /  TBili  0.7  /  DBili  0.3  /  AST  23  /  ALT  20  /  AlkPhos  72  10-18    LIVER FUNCTIONS - ( 18 Oct 2022 07:05 )  Alb: 3.4 g/dL / Pro: 6.6 g/dL / ALK PHOS: 72 U/L / ALT: 20 U/L / AST: 23 U/L / GGT: x           PT/INR - ( 18 Oct 2022 07:04 )   PT: 14.3 sec;   INR: 1.23 ratio         PTT - ( 18 Oct 2022 07:04 )  PTT:33.1 sec    Amylase Serum30      Lipase serum33       Ammonia--  Amylase Serum41      Lipase serum44       Ammonia--      Imaging: No new abdominal imaging               Chief Complaint:  Patient is a 62y old  Female who presents with a chief complaint of Abdominal pain (17 Oct 2022 18:08)      Interval Events: s/p successful biliary stent removal during ERCP yesterday. Hgb 8.3 today from 9.3 yesterday. No acute complaints, melena, hematochezia, N/V, abd pain.      Hospital Medications:  acetaminophen     Tablet .. 650 milliGRAM(s) Oral every 6 hours  calcium carbonate    500 mG (Tums) Chewable 2 Tablet(s) Chew once  carvedilol 3.125 milliGRAM(s) Oral every 12 hours  caspofungin IVPB      caspofungin IVPB 50 milliGRAM(s) IV Intermittent every 24 hours  chlorhexidine 2% Cloths 1 Application(s) Topical daily  cyclobenzaprine 5 milliGRAM(s) Oral three times a day PRN  danazol 200 milliGRAM(s) Oral three times a day  epoetin filiberto-epbx (RETACRIT) Injectable 99946 Unit(s) IV Push <User Schedule>  gabapentin 100 milliGRAM(s) Oral daily  indomethacin Suppository 100 milliGRAM(s) Rectal once  multivitamin 1 Tablet(s) Oral daily  Nephro-deonna 1 Tablet(s) Oral daily  ondansetron Injectable 4 milliGRAM(s) IV Push every 6 hours PRN  oxyCODONE    IR 5 milliGRAM(s) Oral once  oxyCODONE    IR 5 milliGRAM(s) Oral every 6 hours PRN  piperacillin/tazobactam IVPB.. 3.375 Gram(s) IV Intermittent every 12 hours  polyethylene glycol 3350 17 Gram(s) Oral every 12 hours  ruxolitinib 10 milliGRAM(s) Oral <User Schedule>  senna 1 Tablet(s) Oral every 12 hours      PMHX/PSHX:  Polycythemia vera    Peptic ulcer disease    Hypertension    Splenomegaly    Splenic infarct    ESRD on peritoneal dialysis    Cirrhosis of liver without ascites, unspecified hepatic cirrhosis type    Pancreatic cyst    History of myelofibrosis    History of myelofibrosis    No significant past surgical history    Peritoneal dialysis catheter in place    Hemoperitoneum as complication of peritoneal dialysis    AV fistula            ROS: 14 point ROS negative unless otherwise stated in subjective      PHYSICAL EXAM:     GENERAL:  Well developed, no distress  HEENT:  NC/AT,  conjunctivae clear, sclera anicteric  CHEST:  Full & symmetric excursion, no increased effort w/ respirations  HEART:  Regular rhythm & rate  ABDOMEN:  Soft, non-tender, non-distended  EXTREMITIES:  no LE  edema  SKIN:  No rash/erythema/ecchymoses/petechiae/wounds/jaundice  NEURO:  Alert, orientedx3    Vital Signs:  Vital Signs Last 24 Hrs  T(C): 37.1 (18 Oct 2022 05:34), Max: 37.3 (17 Oct 2022 21:16)  T(F): 98.7 (18 Oct 2022 05:34), Max: 99.1 (17 Oct 2022 21:16)  HR: 69 (18 Oct 2022 05:34) (68 - 83)  BP: 113/71 (18 Oct 2022 05:34) (108/63 - 148/70)  BP(mean): --  RR: 18 (18 Oct 2022 05:34) (12 - 21)  SpO2: 97% (18 Oct 2022 05:34) (96% - 98%)    Parameters below as of 18 Oct 2022 05:34  Patient On (Oxygen Delivery Method): room air      Daily Height in cm: 157.48 (17 Oct 2022 15:01)    Daily Weight in k.9 (17 Oct 2022 11:45)    LABS:                        8.3    7.15  )-----------( 140      ( 18 Oct 2022 07:03 )             26.9     10-18    136  |  98  |  22  ----------------------------<  253<H>  4.5   |  24  |  5.27<H>    Ca    8.2<L>      18 Oct 2022 07:05  Phos  1.9     10-18  Mg     2.2     10-18    TPro  6.6  /  Alb  3.4  /  TBili  0.7  /  DBili  0.3  /  AST  23  /  ALT  20  /  AlkPhos  72  10-18    LIVER FUNCTIONS - ( 18 Oct 2022 07:05 )  Alb: 3.4 g/dL / Pro: 6.6 g/dL / ALK PHOS: 72 U/L / ALT: 20 U/L / AST: 23 U/L / GGT: x           PT/INR - ( 18 Oct 2022 07:04 )   PT: 14.3 sec;   INR: 1.23 ratio         PTT - ( 18 Oct 2022 07:04 )  PTT:33.1 sec    Amylase Serum30      Lipase serum33       Ammonia--  Amylase Serum41      Lipase serum44       Ammonia--      Imaging: No new abdominal imaging

## 2022-10-18 NOTE — DISCHARGE NOTE NURSING/CASE MANAGEMENT/SOCIAL WORK - NSDCFUADDAPPT_GEN_ALL_CORE_FT
Follow-up with Dr. Dagher at the Transplant clinic at [phone]  Follow-up with your Nephrologist after discharge and continue dialysis on your normal schedule

## 2022-10-18 NOTE — DISCHARGE NOTE NURSING/CASE MANAGEMENT/SOCIAL WORK - NSDCVIVACCINE_GEN_ALL_CORE_FT
influenza, injectable, quadrivalent, preservative free; 20-Nov-2014 12:44; Aston Ulloa (RN); [employer]; [id number]; IntraMuscular; Deltoid Left.; 0.5 milliLiter(s);   influenza, injectable, quadrivalent, preservative free; 05-Oct-2016 18:15; Rosario James (RN); [employer]; [id number]; IntraMuscular; Deltoid Left.; 0.5 milliLiter(s); VIS (VIS Published: 07-Aug-2015, VIS Presented: 05-Oct-2016);

## 2022-10-19 ENCOUNTER — NON-APPOINTMENT (OUTPATIENT)
Age: 62
End: 2022-10-19

## 2022-10-20 ENCOUNTER — APPOINTMENT (OUTPATIENT)
Dept: HEMATOLOGY ONCOLOGY | Facility: CLINIC | Age: 62
End: 2022-10-20

## 2022-10-20 ENCOUNTER — RESULT REVIEW (OUTPATIENT)
Age: 62
End: 2022-10-20

## 2022-10-20 VITALS
BODY MASS INDEX: 23.12 KG/M2 | OXYGEN SATURATION: 100 % | HEART RATE: 72 BPM | HEIGHT: 62 IN | DIASTOLIC BLOOD PRESSURE: 84 MMHG | TEMPERATURE: 98.1 F | WEIGHT: 125.64 LBS | SYSTOLIC BLOOD PRESSURE: 144 MMHG | RESPIRATION RATE: 16 BRPM

## 2022-10-20 DIAGNOSIS — Z87.448 PERSONAL HISTORY OF OTHER DISEASES OF URINARY SYSTEM: ICD-10-CM

## 2022-10-20 DIAGNOSIS — Z86.79 PERSONAL HISTORY OF OTHER DISEASES OF THE CIRCULATORY SYSTEM: ICD-10-CM

## 2022-10-20 LAB
ANISOCYTOSIS BLD QL: SLIGHT — SIGNIFICANT CHANGE UP
BASOPHILS # BLD AUTO: 0.08 K/UL — SIGNIFICANT CHANGE UP (ref 0–0.2)
BASOPHILS NFR BLD AUTO: 1 % — SIGNIFICANT CHANGE UP (ref 0–2)
DACRYOCYTES BLD QL SMEAR: SLIGHT — SIGNIFICANT CHANGE UP
ELLIPTOCYTES BLD QL SMEAR: SLIGHT — SIGNIFICANT CHANGE UP
EOSINOPHIL # BLD AUTO: 0.31 K/UL — SIGNIFICANT CHANGE UP (ref 0–0.5)
EOSINOPHIL NFR BLD AUTO: 4 % — SIGNIFICANT CHANGE UP (ref 0–6)
HCT VFR BLD CALC: 30.2 % — LOW (ref 34.5–45)
HGB BLD-MCNC: 9.2 G/DL — LOW (ref 11.5–15.5)
LYMPHOCYTES # BLD AUTO: 1.38 K/UL — SIGNIFICANT CHANGE UP (ref 1–3.3)
LYMPHOCYTES # BLD AUTO: 18 % — SIGNIFICANT CHANGE UP (ref 13–44)
MCHC RBC-ENTMCNC: 29.9 PG — SIGNIFICANT CHANGE UP (ref 27–34)
MCHC RBC-ENTMCNC: 30.5 G/DL — LOW (ref 32–36)
MCV RBC AUTO: 98.1 FL — SIGNIFICANT CHANGE UP (ref 80–100)
METAMYELOCYTES # FLD: 2 % — HIGH (ref 0–0)
MONOCYTES # BLD AUTO: 0.15 K/UL — SIGNIFICANT CHANGE UP (ref 0–0.9)
MONOCYTES NFR BLD AUTO: 2 % — SIGNIFICANT CHANGE UP (ref 2–14)
MYELOCYTES NFR BLD: 4 % — HIGH (ref 0–0)
NEUTROPHILS # BLD AUTO: 5.29 K/UL — SIGNIFICANT CHANGE UP (ref 1.8–7.4)
NEUTROPHILS NFR BLD AUTO: 69 % — SIGNIFICANT CHANGE UP (ref 43–77)
NRBC # BLD: 3 /100 — HIGH (ref 0–0)
NRBC # BLD: SIGNIFICANT CHANGE UP /100 WBCS (ref 0–0)
PLAT MORPH BLD: NORMAL — SIGNIFICANT CHANGE UP
PLATELET # BLD AUTO: 156 K/UL — SIGNIFICANT CHANGE UP (ref 150–400)
POIKILOCYTOSIS BLD QL AUTO: SLIGHT — SIGNIFICANT CHANGE UP
POLYCHROMASIA BLD QL SMEAR: SLIGHT — SIGNIFICANT CHANGE UP
RBC # BLD: 3.08 M/UL — LOW (ref 3.8–5.2)
RBC # FLD: 20 % — HIGH (ref 10.3–14.5)
RBC BLD AUTO: ABNORMAL
RETICS #: 103 K/UL — SIGNIFICANT CHANGE UP (ref 25–125)
RETICS/RBC NFR: 3.4 % — HIGH (ref 0.5–2.5)
SCHISTOCYTES BLD QL AUTO: SLIGHT — SIGNIFICANT CHANGE UP
WBC # BLD: 7.67 K/UL — SIGNIFICANT CHANGE UP (ref 3.8–10.5)
WBC # FLD AUTO: 7.67 K/UL — SIGNIFICANT CHANGE UP (ref 3.8–10.5)

## 2022-10-20 PROCEDURE — 99213 OFFICE O/P EST LOW 20 MIN: CPT

## 2022-10-20 RX ORDER — CHLORHEXIDINE GLUCONATE, 0.12% ORAL RINSE 1.2 MG/ML
0.12 SOLUTION DENTAL
Qty: 473 | Refills: 0 | Status: COMPLETED | COMMUNITY
Start: 2020-11-19 | End: 2022-10-20

## 2022-10-20 NOTE — HISTORY OF PRESENT ILLNESS
[de-identified] : This is a 61 year old woman with a history of MAK 2 + polycythemia vera.   diagnosed in 2012. 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 Hollie Guzmán in [place] (# [phone]). She has had a bone marrow biopsy done at the time of diagnosis.  Was previously non complaint with therapy there, however has been quite complaint with the hydroxyurea since coming to [facility] fellows clinic here in 2017.  Patient now follows with Dr. Jacky Guo after closure of the fellows clinic.  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 Hematology was consulted and recommended phlebotomy, but patient refused. She was discharged on Hydrea 500mg on HD days.  which she is still on. As of 9/27/19 she has transferred from the hematology fellows clinic to my service at [facility].  \par \par After July admission, Patient was transitioned to Jakafi 10mg, due to anemia.  Since then patient was started on danazol which succeeded in decreasing transfusion requirements.  Last transfusion took place on 12/30/22.  Patient otherwise feels well and has no complaints.  \par  [de-identified] : Patient seen in follow up using [facility]  ID [id number]. In the interim, she was admitted at [facility] 9/30 to 10/18 with abdominal pain and fever following recent ERCP  and biliary stent placement for choledocholithiasis, on 10/12/22 open cholecystectomy was done, found to have purulent cholecystitis, cultures positive for Klebsiella and candida tropicalis. Inpatient, she received Caspofungin and Zosyn, later transitioned to Fluconazole to be completed on 11/10 along with Augmentin to be completed on 10/28. Repeat ERCP was also done prior to discharge for metal stent removal. During stay Danazol and Jakafi was continued, she received 2 units of PRBC for hgb of 7.0, hgb at discharge 8.3 was. Patient reports feeling well today, with some discomfort at the surgical site. Since discharge has not had any fevers, chills, night sweats or pruritus. Reports appetite has improved, but does note increased reflux and nausea both of which manageable with current meds. Hgb today is 9.2 g/dL.

## 2022-10-20 NOTE — ASSESSMENT
[FreeTextEntry1] : This is a 62 woman with a history of Polycythemia Vera, MAK 2 + since 2012. Patient with history of splenic vein thrombosis (2015), ESRD on HD (M/W/F) via LUE AVF (2/2 chronic GN, started Dec 2017), HTN.  She is now under my super vision for the P. Vera. \par Jakafi was initially started at 15mg TIW.  Dose was decreased to 10mg TIW due to anemia though that was likely from the lack of danazol. 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 Would continue this.  \par Dose was decreased to 10mg TIW with dialysis, but did not help with the anemia.  Patient was then started on danazol 400mg daily, then 600mg daily, which at the time, we had thought this improved her HG from the 6's range requiring transfusion to 8.5- 9.5 g/dl consistently.  \par \par Patient now admits that somewhere along the line, even very early on in the treatment was prescribed at 600mg, patient was really only taking 1-2 tablets a day, roughly half the dose that we had thought she was on. Since the last visit she reports compliance with the 600mg dose of Danazol. Hemoglobin was stable between 8-9 but for the month of September was transfused twice. This was in the setting of infection now s/p cholecystectomy and found to have purulent cholecystitis on 10/12. Hgb today 9.2, she will continue current Danazol dose, will have her return monthly for port-flush and labs.\par \par Again reiterated that she should continue Jakafi 10mg TIW which does not effect the anemia beneficially, but does control the splenomegaly and abdominal pain symptoms.  \par \par RTC 3 months\par Case and management discussed with Dr. Jacky Guo\par

## 2022-10-20 NOTE — REVIEW OF SYSTEMS
[Skin Wound] : skin wound [Negative] : Neurological [FreeTextEntry7] : nausea and acid reflux [de-identified] : s

## 2022-10-20 NOTE — PHYSICAL EXAM
[Normal] : RRR, normal S1S2, no murmurs, rubs, gallops [de-identified] : moist, no oral lesions. + erythema to pharynx [de-identified] : no calf tenderness [de-identified] : normoactive BS, soft, + splenomegaly [de-identified] : no palpable tenderness [de-identified] : + surgical wound to R side of abdomen, staples intact, wound edges approximate, no redness

## 2022-10-21 ENCOUNTER — NON-APPOINTMENT (OUTPATIENT)
Age: 62
End: 2022-10-21

## 2022-10-24 ENCOUNTER — OUTPATIENT (OUTPATIENT)
Dept: OUTPATIENT SERVICES | Facility: HOSPITAL | Age: 62
LOS: 1 days | End: 2022-10-24
Payer: SELF-PAY

## 2022-10-24 ENCOUNTER — INPATIENT (INPATIENT)
Facility: HOSPITAL | Age: 62
LOS: 2 days | Discharge: HOME | DRG: 444 | End: 2022-10-27
Attending: TRANSPLANT SURGERY | Admitting: TRANSPLANT SURGERY
Payer: MEDICAID

## 2022-10-24 ENCOUNTER — APPOINTMENT (OUTPATIENT)
Dept: SURGERY | Facility: HOSPITAL | Age: 62
End: 2022-10-24
Payer: COMMERCIAL

## 2022-10-24 VITALS
TEMPERATURE: 99 F | DIASTOLIC BLOOD PRESSURE: 88 MMHG | WEIGHT: 123.46 LBS | SYSTOLIC BLOOD PRESSURE: 140 MMHG | OXYGEN SATURATION: 99 % | HEIGHT: 62 IN | RESPIRATION RATE: 18 BRPM | HEART RATE: 101 BPM

## 2022-10-24 VITALS
BODY MASS INDEX: 23.53 KG/M2 | DIASTOLIC BLOOD PRESSURE: 82 MMHG | TEMPERATURE: 97.2 F | HEIGHT: 62 IN | SYSTOLIC BLOOD PRESSURE: 150 MMHG | WEIGHT: 127.87 LBS | RESPIRATION RATE: 14 BRPM | HEART RATE: 82 BPM

## 2022-10-24 DIAGNOSIS — T85.898A OTHER SPECIFIED COMPLICATION OF OTHER INTERNAL PROSTHETIC DEVICES, IMPLANTS AND GRAFTS, INITIAL ENCOUNTER: Chronic | ICD-10-CM

## 2022-10-24 DIAGNOSIS — K81.0 ACUTE CHOLECYSTITIS: ICD-10-CM

## 2022-10-24 DIAGNOSIS — Z99.2 DEPENDENCE ON RENAL DIALYSIS: Chronic | ICD-10-CM

## 2022-10-24 DIAGNOSIS — R10.9 UNSPECIFIED ABDOMINAL PAIN: ICD-10-CM

## 2022-10-24 DIAGNOSIS — I77.0 ARTERIOVENOUS FISTULA, ACQUIRED: Chronic | ICD-10-CM

## 2022-10-24 PROCEDURE — 99285 EMERGENCY DEPT VISIT HI MDM: CPT

## 2022-10-24 PROCEDURE — G0463: CPT

## 2022-10-24 PROCEDURE — ZZZZZ: CPT

## 2022-10-24 NOTE — ED ADULT TRIAGE NOTE - BP NONINVASIVE SYSTOLIC (MM HG)
140 Hemigard Intro: Due to skin fragility and wound tension, it was decided to use HEMIGARD adhesive retention suture devices to permit a linear closure. The skin was cleaned and dried for a 6cm distance away from the wound. Excessive hair, if present, was removed to allow for adhesion.

## 2022-10-24 NOTE — ASSESSMENT
[FreeTextEntry1] : This is a 62 year old female with a history of ESRD, myelofibrosis, portal HTN who presents after an open cholecystectomy on 10/12/22 with abdominal pain. Patient found to have new onset abdominal pain for 2 days, was previously recovering well.

## 2022-10-24 NOTE — REVIEW OF SYSTEMS
[Abdominal Pain] : abdominal pain [Diarrhea] : diarrhea [Shortness Of Breath] : shortness of breath [Negative] : Cardiovascular [Fever] : no fever [Chills] : no chills [Feeling Tired] : not feeling tired [Vomiting] : no vomiting [Constipation] : no constipation [Dizziness] : no dizziness

## 2022-10-24 NOTE — PLAN
[FreeTextEntry1] : -Given new onset abdominal pain, recommend CTAP to evaluate for abscess and/or hernia\par -Staples removed at today's visit\par -If symptoms improve after staple removal today, patient does not need to get CTAP \par -Monitor for s/s of infection

## 2022-10-24 NOTE — ADDENDUM
[FreeTextEntry1] : Pt seen and examined with resident, agree with above. Pt had open vikas on 10/12 by Dr. Dagher, has h/o portal hypertension. She notes that she was feeling well until two days ago, when she started having constant, moderate pain at the lateral side of her incision, worse with lying down. No nausea or vomiting. Having some loose/ soft stools (three per day). No fevers or chills. \par \par On exam, she appears comfortable without distress, no jaundice. Abd soft, RUQ incision with staples without erythema or drainage, some very mild fullness superior to incision, no palpable hernias or collections, no skin changes. +tender to palpation on lateral side of incision (both superior and inferior to incision and lateral to the incision). Some numbness between her incision and her umbilicus.\par \par I reassured Ms. Landin that the numbness can be normal after surgery and does resolve over time frequently. I explained that given that she had no pain and then started to have new pain, although her physical exam is relatively benign, considering her complex medical history, I recommend CT a/p to evaluate for hernias and fluid collections. I spoke with Dr. Dagher and let him know this plan. Will remove staples today. Questions answered. I asked Ms. Landin to call my office when the CT is done so I can look at the results.

## 2022-10-24 NOTE — HISTORY OF PRESENT ILLNESS
[de-identified] : This is a 62 year old female with a PSH significant for open cholecystectomy on 10/12/22 who presents to the clinic today for incisional pain and removal of staples. Patient states that 2 days ago she began experiencing a burning pain around the incision site. She endorses lifting her grandchild in the postoperative period. She states the pain is worsened with movement and laying flat. Prior to this, pt was recovering well with minimal pain. Pt denies fever, chills, N/V. Patient endorses 3-4 episodes of diarrhea daily since surgery.  [de-identified] : Surgical pathology from 10/12/22 demonstrating acute and chronic cholecystitis

## 2022-10-24 NOTE — PHYSICAL EXAM
[Normal Breath Sounds] : Normal breath sounds [Normal Heart Sounds] : normal heart sounds [Abdomen Tenderness] : ~T ~M Abdominal tenderness [Alert] : alert [Oriented to Person] : oriented to person [Oriented to Place] : oriented to place [Oriented to Time] : oriented to time [Calm] : calm [Abdominal Masses] : No abdominal masses [de-identified] : NAD, well appearing  [de-identified] : NCAT  [de-identified] : Supple  [de-identified] : Moves all extremities freely  [de-identified] : Warm and well perfused

## 2022-10-25 ENCOUNTER — NON-APPOINTMENT (OUTPATIENT)
Age: 62
End: 2022-10-25

## 2022-10-25 ENCOUNTER — APPOINTMENT (OUTPATIENT)
Dept: ENDOVASCULAR SURGERY | Facility: CLINIC | Age: 62
End: 2022-10-25

## 2022-10-25 DIAGNOSIS — R10.9 UNSPECIFIED ABDOMINAL PAIN: ICD-10-CM

## 2022-10-25 LAB
ALBUMIN SERPL ELPH-MCNC: 4.1 G/DL — SIGNIFICANT CHANGE UP (ref 3.3–5)
ALP SERPL-CCNC: 83 U/L — SIGNIFICANT CHANGE UP (ref 40–120)
ALT FLD-CCNC: 23 U/L — SIGNIFICANT CHANGE UP (ref 10–45)
ANION GAP SERPL CALC-SCNC: 14 MMOL/L — SIGNIFICANT CHANGE UP (ref 5–17)
ANISOCYTOSIS BLD QL: SIGNIFICANT CHANGE UP
APTT BLD: 30.1 SEC — SIGNIFICANT CHANGE UP (ref 27.5–35.5)
AST SERPL-CCNC: 19 U/L — SIGNIFICANT CHANGE UP (ref 10–40)
BASE EXCESS BLDV CALC-SCNC: 7.1 MMOL/L — HIGH (ref -2–3)
BASOPHILS # BLD AUTO: 0.16 K/UL — SIGNIFICANT CHANGE UP (ref 0–0.2)
BASOPHILS NFR BLD AUTO: 1.8 % — SIGNIFICANT CHANGE UP (ref 0–2)
BILIRUB SERPL-MCNC: 1 MG/DL — SIGNIFICANT CHANGE UP (ref 0.2–1.2)
BLD GP AB SCN SERPL QL: NEGATIVE — SIGNIFICANT CHANGE UP
BUN SERPL-MCNC: 20 MG/DL — SIGNIFICANT CHANGE UP (ref 7–23)
CA-I SERPL-SCNC: 1.13 MMOL/L — LOW (ref 1.15–1.33)
CALCIUM SERPL-MCNC: 9 MG/DL — SIGNIFICANT CHANGE UP (ref 8.4–10.5)
CHLORIDE BLDV-SCNC: 99 MMOL/L — SIGNIFICANT CHANGE UP (ref 96–108)
CHLORIDE SERPL-SCNC: 96 MMOL/L — SIGNIFICANT CHANGE UP (ref 96–108)
CO2 BLDV-SCNC: 33 MMOL/L — HIGH (ref 22–26)
CO2 SERPL-SCNC: 28 MMOL/L — SIGNIFICANT CHANGE UP (ref 22–31)
CREAT SERPL-MCNC: 3.64 MG/DL — HIGH (ref 0.5–1.3)
DACRYOCYTES BLD QL SMEAR: SLIGHT — SIGNIFICANT CHANGE UP
EGFR: 14 ML/MIN/1.73M2 — LOW
ELLIPTOCYTES BLD QL SMEAR: SLIGHT — SIGNIFICANT CHANGE UP
EOSINOPHIL # BLD AUTO: 0.08 K/UL — SIGNIFICANT CHANGE UP (ref 0–0.5)
EOSINOPHIL NFR BLD AUTO: 0.9 % — SIGNIFICANT CHANGE UP (ref 0–6)
GAS PNL BLDV: 135 MMOL/L — LOW (ref 136–145)
GAS PNL BLDV: SIGNIFICANT CHANGE UP
GLUCOSE BLDV-MCNC: 84 MG/DL — SIGNIFICANT CHANGE UP (ref 70–99)
GLUCOSE SERPL-MCNC: 79 MG/DL — SIGNIFICANT CHANGE UP (ref 70–99)
HCO3 BLDV-SCNC: 31 MMOL/L — HIGH (ref 22–29)
HCT VFR BLD CALC: 24.4 % — LOW (ref 34.5–45)
HCT VFR BLDA CALC: 21 % — CRITICAL LOW (ref 34.5–46.5)
HGB BLD CALC-MCNC: 7.1 G/DL — LOW (ref 11.7–16.1)
HGB BLD-MCNC: 7.7 G/DL — LOW (ref 11.5–15.5)
INR BLD: 1.23 RATIO — HIGH (ref 0.88–1.16)
LACTATE BLDV-MCNC: 0.5 MMOL/L — SIGNIFICANT CHANGE UP (ref 0.5–2)
LIDOCAIN IGE QN: 137 U/L — HIGH (ref 7–60)
LYMPHOCYTES # BLD AUTO: 1.12 K/UL — SIGNIFICANT CHANGE UP (ref 1–3.3)
LYMPHOCYTES # BLD AUTO: 12.3 % — LOW (ref 13–44)
MANUAL SMEAR VERIFICATION: SIGNIFICANT CHANGE UP
MCHC RBC-ENTMCNC: 30.3 PG — SIGNIFICANT CHANGE UP (ref 27–34)
MCHC RBC-ENTMCNC: 31.6 GM/DL — LOW (ref 32–36)
MCV RBC AUTO: 96.1 FL — SIGNIFICANT CHANGE UP (ref 80–100)
METAMYELOCYTES # FLD: 0.9 % — HIGH (ref 0–0)
MONOCYTES # BLD AUTO: 0.24 K/UL — SIGNIFICANT CHANGE UP (ref 0–0.9)
MONOCYTES NFR BLD AUTO: 2.6 % — SIGNIFICANT CHANGE UP (ref 2–14)
NEUTROPHILS # BLD AUTO: 7.43 K/UL — HIGH (ref 1.8–7.4)
NEUTROPHILS NFR BLD AUTO: 78.9 % — HIGH (ref 43–77)
NEUTS BAND # BLD: 2.6 % — SIGNIFICANT CHANGE UP (ref 0–8)
OVALOCYTES BLD QL SMEAR: SLIGHT — SIGNIFICANT CHANGE UP
PCO2 BLDV: 42 MMHG — SIGNIFICANT CHANGE UP (ref 39–42)
PH BLDV: 7.48 — HIGH (ref 7.32–7.43)
PLAT MORPH BLD: NORMAL — SIGNIFICANT CHANGE UP
PLATELET # BLD AUTO: 129 K/UL — LOW (ref 150–400)
PO2 BLDV: 47 MMHG — HIGH (ref 25–45)
POIKILOCYTOSIS BLD QL AUTO: SLIGHT — SIGNIFICANT CHANGE UP
POLYCHROMASIA BLD QL SMEAR: SLIGHT — SIGNIFICANT CHANGE UP
POTASSIUM BLDV-SCNC: 3.9 MMOL/L — SIGNIFICANT CHANGE UP (ref 3.5–5.1)
POTASSIUM SERPL-MCNC: 4.2 MMOL/L — SIGNIFICANT CHANGE UP (ref 3.5–5.3)
POTASSIUM SERPL-SCNC: 4.2 MMOL/L — SIGNIFICANT CHANGE UP (ref 3.5–5.3)
PROT SERPL-MCNC: 7.4 G/DL — SIGNIFICANT CHANGE UP (ref 6–8.3)
PROTHROM AB SERPL-ACNC: 14.2 SEC — HIGH (ref 10.5–13.4)
RBC # BLD: 2.54 M/UL — LOW (ref 3.8–5.2)
RBC # FLD: 20.7 % — HIGH (ref 10.3–14.5)
RBC BLD AUTO: ABNORMAL
RH IG SCN BLD-IMP: POSITIVE — SIGNIFICANT CHANGE UP
SAO2 % BLDV: 79.8 % — SIGNIFICANT CHANGE UP (ref 67–88)
SARS-COV-2 RNA SPEC QL NAA+PROBE: SIGNIFICANT CHANGE UP
SCHISTOCYTES BLD QL AUTO: SLIGHT — SIGNIFICANT CHANGE UP
SODIUM SERPL-SCNC: 138 MMOL/L — SIGNIFICANT CHANGE UP (ref 135–145)
WBC # BLD: 9.12 K/UL — SIGNIFICANT CHANGE UP (ref 3.8–10.5)
WBC # FLD AUTO: 9.12 K/UL — SIGNIFICANT CHANGE UP (ref 3.8–10.5)

## 2022-10-25 PROCEDURE — 99255 IP/OBS CONSLTJ NEW/EST HI 80: CPT

## 2022-10-25 PROCEDURE — 99222 1ST HOSP IP/OBS MODERATE 55: CPT | Mod: GC

## 2022-10-25 PROCEDURE — 71045 X-RAY EXAM CHEST 1 VIEW: CPT | Mod: 26

## 2022-10-25 PROCEDURE — 74177 CT ABD & PELVIS W/CONTRAST: CPT | Mod: 26,MA

## 2022-10-25 RX ORDER — PIPERACILLIN AND TAZOBACTAM 4; .5 G/20ML; G/20ML
3.38 INJECTION, POWDER, LYOPHILIZED, FOR SOLUTION INTRAVENOUS ONCE
Refills: 0 | Status: COMPLETED | OUTPATIENT
Start: 2022-10-25 | End: 2022-10-25

## 2022-10-25 RX ORDER — PIPERACILLIN AND TAZOBACTAM 4; .5 G/20ML; G/20ML
3.38 INJECTION, POWDER, LYOPHILIZED, FOR SOLUTION INTRAVENOUS EVERY 12 HOURS
Refills: 0 | Status: DISCONTINUED | OUTPATIENT
Start: 2022-10-26 | End: 2022-10-27

## 2022-10-25 RX ORDER — MORPHINE SULFATE 50 MG/1
4 CAPSULE, EXTENDED RELEASE ORAL ONCE
Refills: 0 | Status: DISCONTINUED | OUTPATIENT
Start: 2022-10-25 | End: 2022-10-25

## 2022-10-25 RX ORDER — ONDANSETRON 8 MG/1
4 TABLET, FILM COATED ORAL ONCE
Refills: 0 | Status: COMPLETED | OUTPATIENT
Start: 2022-10-25 | End: 2022-10-25

## 2022-10-25 RX ORDER — PIPERACILLIN AND TAZOBACTAM 4; .5 G/20ML; G/20ML
3.38 INJECTION, POWDER, LYOPHILIZED, FOR SOLUTION INTRAVENOUS ONCE
Refills: 0 | Status: COMPLETED | OUTPATIENT
Start: 2022-10-25 | End: 2022-10-26

## 2022-10-25 RX ORDER — FLUCONAZOLE 150 MG/1
200 TABLET ORAL DAILY
Refills: 0 | Status: DISCONTINUED | OUTPATIENT
Start: 2022-10-25 | End: 2022-10-27

## 2022-10-25 RX ORDER — PANTOPRAZOLE SODIUM 20 MG/1
40 TABLET, DELAYED RELEASE ORAL
Refills: 0 | Status: DISCONTINUED | OUTPATIENT
Start: 2022-10-25 | End: 2022-10-27

## 2022-10-25 RX ORDER — ACETAMINOPHEN 500 MG
650 TABLET ORAL ONCE
Refills: 0 | Status: COMPLETED | OUTPATIENT
Start: 2022-10-25 | End: 2022-10-25

## 2022-10-25 RX ORDER — SODIUM CHLORIDE 9 MG/ML
1000 INJECTION INTRAMUSCULAR; INTRAVENOUS; SUBCUTANEOUS
Refills: 0 | Status: DISCONTINUED | OUTPATIENT
Start: 2022-10-25 | End: 2022-10-27

## 2022-10-25 RX ORDER — MAGNESIUM SULFATE 500 MG/ML
2 VIAL (ML) INJECTION ONCE
Refills: 0 | Status: COMPLETED | OUTPATIENT
Start: 2022-10-25 | End: 2022-10-25

## 2022-10-25 RX ORDER — ACETAMINOPHEN 500 MG
1000 TABLET ORAL ONCE
Refills: 0 | Status: COMPLETED | OUTPATIENT
Start: 2022-10-25 | End: 2022-10-25

## 2022-10-25 RX ADMIN — Medication 650 MILLIGRAM(S): at 18:00

## 2022-10-25 RX ADMIN — Medication 25 GRAM(S): at 09:00

## 2022-10-25 RX ADMIN — MORPHINE SULFATE 4 MILLIGRAM(S): 50 CAPSULE, EXTENDED RELEASE ORAL at 06:44

## 2022-10-25 RX ADMIN — Medication 400 MILLIGRAM(S): at 05:03

## 2022-10-25 RX ADMIN — PIPERACILLIN AND TAZOBACTAM 200 GRAM(S): 4; .5 INJECTION, POWDER, LYOPHILIZED, FOR SOLUTION INTRAVENOUS at 17:07

## 2022-10-25 RX ADMIN — SODIUM CHLORIDE 10 MILLILITER(S): 9 INJECTION INTRAMUSCULAR; INTRAVENOUS; SUBCUTANEOUS at 06:51

## 2022-10-25 RX ADMIN — MORPHINE SULFATE 4 MILLIGRAM(S): 50 CAPSULE, EXTENDED RELEASE ORAL at 04:59

## 2022-10-25 RX ADMIN — FLUCONAZOLE 200 MILLIGRAM(S): 150 TABLET ORAL at 18:07

## 2022-10-25 RX ADMIN — PANTOPRAZOLE SODIUM 40 MILLIGRAM(S): 20 TABLET, DELAYED RELEASE ORAL at 18:03

## 2022-10-25 RX ADMIN — ONDANSETRON 4 MILLIGRAM(S): 8 TABLET, FILM COATED ORAL at 18:01

## 2022-10-25 RX ADMIN — Medication 1000 MILLIGRAM(S): at 05:20

## 2022-10-25 RX ADMIN — Medication 1000 MILLIGRAM(S): at 06:44

## 2022-10-25 NOTE — H&P ADULT - NSICDXFAMILYHX_GEN_ALL_CORE_FT
Final dose of remdesivir discontinued in [facility] before dose was administered last night, spoke with physician to see why med would have been discontinued instead of completed, physician contacted pharmacy and final dose was re-ordered and new bag was sent to floor. Final dose of remdesivir administered at 14:41.  Dose from last night retrieved from lockbox and returned to pharmacy for disposal. Electronically signed by Tyronne Burkitt, RN on 8/23/2021 at 6:30 PM FAMILY HISTORY:  Family history of hypertension in mother    Sibling  Still living? Unknown  FH: cirrhosis, Age at diagnosis: Age Unknown    Grandparent  Still living? No  Family history of malignant neoplasm, Age at diagnosis: Age Unknown

## 2022-10-25 NOTE — ED PROVIDER NOTE - ATTENDING CONTRIBUTION TO CARE
Patient with complicated medical history including ESRD on dialysis Monday Wednesday Friday, pancreatitis, recent cholecystectomy, presents with 2 days of worsening right upper quadrant abdominal pain, diarrhea, nausea, fever.  On exam, patient is in no acute distress, but exhibits significant tenderness in the right upper quadrant with guarding, is tachycardic to about 100, afebrile.  Due to recent surgery and degree of tenderness, we will obtain a CT to assess for postsurgical complication such as infection versus hematoma versus abscess, and check labs to assess for pancreatitis or other acute intra-abdominal process. I saw and evaluated patient with resident. I discussed H+P and MDM with resident. I agree with the statements made by the resident and have made any relevant edits on the note above.

## 2022-10-25 NOTE — PATIENT PROFILE ADULT - NSPRESCRUSEDDRG_GEN_A_NUR
----- Message from Melida Red MD sent at 9/3/2020  2:23 PM CDT -----  Please let My know her beta continues to drop. Continue weekly betas   No

## 2022-10-25 NOTE — H&P ADULT - NSHPPHYSICALEXAM_GEN_ALL_CORE
GENERAL:  Appears stated age, well-groomed, well-nourished, no distress  HEENT:  NC/AT,  conjunctivae clear and pink  CHEST:  Full & symmetric excursion, no increased effort  HEART:  Regular rhythm, S1, S2, no murmur/rub/S3/S4  ABDOMEN:  Soft, +RUQ-tenderness, non-distended  EXTREMITIES:  no cyanosis, clubbing or edema  SKIN:  No rash/erythema/ecchymoses/petechiae/wounds/abscess/warm/dry  NEURO:  Alert, orientedx3

## 2022-10-25 NOTE — CONSULT NOTE ADULT - ASSESSMENT
62 year old female with history of myelofibrosis, polycythemia vera, ESRD on MWF schedule via LUE AVF (last dialysis W), portal HTN c/b IGV1, subclavian port for transfusions, gastric varices, gallstones, ERCP 9/22/2022 for sludge and stones s/p covered metal biliary stent 10 mm x 4 cm c/b post ERCP pancreatitis who presents with RUQ pain that began 10/22.     Given microabscess finding on 10/4 MRCP was discharged to complete total 4 weeks of antibiotics / antifungals.   Was planned to complete Fluconazole on 11/10/22 and Augmentin on 10/28/22    CT A/P (10/25) s/p Cholecystectomy with tubular structure measuring 11 mm in diameter and containing what appears to be small gallstones is seen in the gallbladder fossa. This may represent a remnant cystic duct. There is a trace amount   of minimal high density fluid within the gallbladder fossa.    #Liver Abscess (History of), Abdominal Pain, Abnormal CT A/P  --Continue Zosyn and Fluconazole pending workup  --Follow up on MRCP  --Continue to follow CBC with diff  --Continue to follow transaminases  --Continue to follow temperature curve  --Follow up on preliminary blood cultures    I will continue to follow. Please feel free to contact me with any further questions.    Juancho Kelly M.D.  [facility] Division of Infectious Disease  8AM-5PM Monday - Friday: Available on [employer] Teams  After Hours and Holidays (or if no response on [employer] Teams): Please contact the Infectious Diseases Office at [phone]    The above assessment and plan were discussed with Transplant surgery NP

## 2022-10-25 NOTE — H&P ADULT - HISTORY OF PRESENT ILLNESS
62 year old female with history of myelofibrosis, polycythemia vera, ESRD on MWF schedule via LUE AVF (last dialysis W), portal HTN c/b IGV1, subclavian port for transfusions, gastric varices, gallstones, ERCP 9/22/2022 for sludge and stones s/p covered metal biliary stent 10 mm x 4 cm c/b post ERCP pancreatitis who presents with RUQ pain that began 10/22. Reports she has never had pain like this before, and it is new since her [facility]. Reports a burning sensation around her incision site and reports when she is riding in a car or moving around, it feels like her incision site is going to burst open. Reports some chills 10/24 but o/w denies any post-prandial pain, N/V/D/C, changes in appetite, fevers. Currently pt reporting RUQ pain, but afebrile, hemodynamically stable, and normal WBC and Tbili/liver enyzmes normal. Had CT A/P showing unchanged pneumobilia, in bile ducts; s/p cholecystectomy; tubular structure measuring 11 mm in diameter and containing what appears to be small gallstones is seen in the gallbladder fossa, may represent a remnant cystic duct; trace amount of minimal high density fluid within the gallbladder fossa. [facility] consulted for eval of RUQ pain after biliary stent removal.     Of note was recently admitted earlier this month with RUQ/epigastric pain and fevers. Underwent open cholecystectomy on 10/12/2022 for acute cholecystitis. Had biliary stent removed 10/17 by [facility].   Pt presents with 2 days of worsening right upper quadrant abdominal pain, diarrhea, nausea,

## 2022-10-25 NOTE — ED ADULT NURSE NOTE - NSICDXPASTMEDICALHX_GEN_ALL_CORE_FT
PAST MEDICAL HISTORY:  History of myelofibrosis     History of myelofibrosis     Hypertension     Pancreatic cyst     Peptic ulcer disease     Polycythemia vera and secondary myelofibrosis    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

## 2022-10-25 NOTE — CONSULT NOTE ADULT - SUBJECTIVE AND OBJECTIVE BOX
Chief Complaint:  Patient is a 62y old  Female who presents with a chief complaint of     Pashto  #[id number] was used.    HPI: MIHYEON HU is a 62 year old female with history of myelofibrosis, polycythemia vera, ESRD on MWF schedule via LUE AVF (last dialysis W), portal HTN c/b IGV1, subclavian port for transfusions, gastric varices, gallstones, ERCP 9/22/2022 for sludge and stones s/p covered metal biliary stent 10 mm x 4 cm c/b post ERCP pancreatitis who presents with RUQ pain. Had biliary stent removed 10/17 by [facility]. Of note was recently admitted earlier this month with RUQ/epigastric pain and fevers. Underwent open cholecystectomy on 10/12/2022 for acute cholecystitis. Currently pt reporting RUQ pain, but afebrile, hemodynamically stable, and normal WBC and Tbili/liver enyzmes normal. Had CT A/P showing unchanged pneumobilia, in bile ducts; s/p cholecystectomy; tubular structure measuring 11 mm in diameter and containing what appears to be small gallstones is seen in the gallbladder fossa, may represent a remnant cystic duct; trace amount of minimal high density fluid within the gallbladder fossa. [facility] consulted for eval of RUQ pain after biliary stent removal.     Otherwise, patient denies fevers, chills, weight loss, dysphagia, odynophagia, early satiety, poor oral intake, nausea, vomiting, diarrhea, melena, hematemesis, hematochezia, change in stool caliber, or family history of GI-related cancers.    Allergies:  No Known Allergies      Home Medications:    Hospital Medications:  sodium chloride 0.9%. 1000 milliLiter(s) IV Continuous <Continuous>      PMHX/PSHX:  Polycythemia vera    Peptic ulcer disease    Hypertension    Splenomegaly    Splenic infarct    ESRD on peritoneal dialysis    Cirrhosis of liver without ascites, unspecified hepatic cirrhosis type    Pancreatic cyst    History of myelofibrosis    History of myelofibrosis    No significant past surgical history    Peritoneal dialysis catheter in place    Hemoperitoneum as complication of peritoneal dialysis    AV fistula        Family history:      Family history of malignant neoplasm (Grandparent)    FH: cirrhosis (Sibling)      Social History:   ETOH: denies  Tobacco: denies  Illicit drug use: denies    ROS: 14 point ROS negative unless otherwise stated in HPI      Vital Signs:  Vital Signs Last 24 Hrs  T(C): 37.1 (25 Oct 2022 08:47), Max: 37.3 (24 Oct 2022 22:21)  T(F): 98.7 (25 Oct 2022 08:47), Max: 99.2 (24 Oct 2022 22:21)  HR: 70 (25 Oct 2022 08:47) (70 - 101)  BP: 138/65 (25 Oct 2022 08:47) (138/65 - 147/73)  BP(mean): 90 (25 Oct 2022 06:38) (90 - 94)  RR: 17 (25 Oct 2022 08:47) (16 - 18)  SpO2: 100% (25 Oct 2022 08:47) (96% - 100%)    Parameters below as of 25 Oct 2022 08:47  Patient On (Oxygen Delivery Method): room air      Daily Height in cm: 157.48 (24 Oct 2022 22:21)    Daily     PHYSICAL EXAM:     GENERAL:  Appears stated age, well-groomed, well-nourished, no distress  HEENT:  NC/AT,  conjunctivae clear and pink  CHEST:  Full & symmetric excursion, no increased effort  HEART:  Regular rhythm, S1, S2, no murmur/rub/S3/S4  ABDOMEN:  Soft, +RUQ-tenderness, non-distended  EXTREMITIES:  no cyanosis,clubbing or edema  SKIN:  No rash/erythema/ecchymoses/petechiae/wounds/abscess/warm/dry  NEURO:  Alert, orientedx3      LABS:                        7.7    9.12  )-----------( 129      ( 25 Oct 2022 02:43 )             24.4     10-25    138  |  96  |  20  ----------------------------<  79  4.2   |  28  |  3.64<H>    Ca    9.0      25 Oct 2022 02:43    TPro  7.4  /  Alb  4.1  /  TBili  1.0  /  DBili  x   /  AST  19  /  ALT  23  /  AlkPhos  83  10-25    LIVER FUNCTIONS - ( 25 Oct 2022 02:43 )  Alb: 4.1 g/dL / Pro: 7.4 g/dL / ALK PHOS: 83 U/L / ALT: 23 U/L / AST: 19 U/L / GGT: x           PT/INR - ( 25 Oct 2022 03:54 )   PT: 14.2 sec;   INR: 1.23 ratio         PTT - ( 25 Oct 2022 03:54 )  PTT:30.1 sec    Amylase Serum--      Lipase [id number]       Ammonia--      Imaging:       ACC: [id number] EXAM:  CT ABDOMEN AND PELVIS IC                          PROCEDURE DATE:  10/25/2022          INTERPRETATION:  CLINICAL INFORMATION: Abdominal pain. Cholecystectomy   evaluate for abscess.    COMPARISON: CT abdomen pelvis 9/30/2022    CONTRAST/COMPLICATIONS:  IV Contrast: Omnipaque 350  90 cc administered   10 cc discarded  Oral Contrast: NONE  Complications: None reported at time of study completion    PROCEDURE:  CT of the Abdomen and Pelvis was performed.  Sagittal and coronal reformats were performed.    FINDINGS:  LOWER CHEST: Central line tip in right atrium. Mild bibasilar   atelectasis. Trace right pleural fluid.    LIVER: Within normal limits.  BILE DUCTS: Pneumobilia, unchanged.  GALLBLADDER: Cholecystectomy. A tubular structure measuring 11 mm in   diameter and containing what appears to be small gallstones is seen in   the gallbladder fossa. This may represent a remnant cystic duct. There is   a trace amount of minimal high density fluid within the gallbladder fossa.  SPLEEN: Splenomegaly.  PANCREAS: Within normal limits.  ADRENALS: Within normal limits.  KIDNEYS/URETERS: Atrophic kidneys.    BLADDER: Within normal limits.  REPRODUCTIVE ORGANS: Uterus and adnexa within normal limits.    BOWEL: No bowel obstruction. Appendix is normal.  PERITONEUM: Small pelvic mildly high density ascites.  VESSELS: Abdominal varices unchanged.  RETROPERITONEUM/LYMPH NODES: No lymphadenopathy.  ABDOMINAL WALL: Tiny fat-containing umbilical hernia.  BONES: Within normal limits.    IMPRESSION:    Cholecystectomy. A tubular structure measuring 11 mm in diameter and   containing what appears to be small gallstones is seen in the gallbladder   fossa. This may represent a remnant cystic duct. There is a trace amount   of minimal high density fluid within the gallbladder fossa.             Chief Complaint:  Patient is a 62y old  Female who presents with a chief complaint of     Croatian  #[id number] was used.    HPI: MIHYEON HU is a 62 year old female with history of myelofibrosis, polycythemia vera, ESRD on MWF schedule via LUE AVF (last dialysis W), portal HTN c/b IGV1, subclavian port for transfusions, gastric varices, gallstones, ERCP 9/22/2022 for sludge and stones s/p covered metal biliary stent 10 mm x 4 cm c/b post ERCP pancreatitis who presents with RUQ pain that began 10/22. Reports she has never had pain like this before, and it is new since her [facility]. Reports a burning sensation around her incision site and reports when she is riding in a car or moving around, it feels like her incision site is going to burst open. Reports some chills 10/24 but o/w denies any post-prandial pain, N/V/D/C, changes in appetite, fevers. Currently pt reporting RUQ pain, but afebrile, hemodynamically stable, and normal WBC and Tbili/liver enyzmes normal. Had CT A/P showing unchanged pneumobilia, in bile ducts; s/p cholecystectomy; tubular structure measuring 11 mm in diameter and containing what appears to be small gallstones is seen in the gallbladder fossa, may represent a remnant cystic duct; trace amount of minimal high density fluid within the gallbladder fossa. [facility] consulted for eval of RUQ pain after biliary stent removal.     Of note was recently admitted earlier this month with RUQ/epigastric pain and fevers. Underwent open cholecystectomy on 10/12/2022 for acute cholecystitis. Had biliary stent removed 10/17 by [facility].       Allergies:  No Known Allergies      Home Medications:    Hospital Medications:  sodium chloride 0.9%. 1000 milliLiter(s) IV Continuous <Continuous>      PMHX/PSHX:  Polycythemia vera    Peptic ulcer disease    Hypertension    Splenomegaly    Splenic infarct    ESRD on peritoneal dialysis    Cirrhosis of liver without ascites, unspecified hepatic cirrhosis type    Pancreatic cyst    History of myelofibrosis    History of myelofibrosis    No significant past surgical history    Peritoneal dialysis catheter in place    Hemoperitoneum as complication of peritoneal dialysis    AV fistula        Family history:      Family history of malignant neoplasm (Grandparent)    FH: cirrhosis (Sibling)      Social History:   ETOH: denies  Tobacco: denies  Illicit drug use: denies    ROS: 14 point ROS negative unless otherwise stated in HPI      Vital Signs:  Vital Signs Last 24 Hrs  T(C): 37.1 (25 Oct 2022 08:47), Max: 37.3 (24 Oct 2022 22:21)  T(F): 98.7 (25 Oct 2022 08:47), Max: 99.2 (24 Oct 2022 22:21)  HR: 70 (25 Oct 2022 08:47) (70 - 101)  BP: 138/65 (25 Oct 2022 08:47) (138/65 - 147/73)  BP(mean): 90 (25 Oct 2022 06:38) (90 - 94)  RR: 17 (25 Oct 2022 08:47) (16 - 18)  SpO2: 100% (25 Oct 2022 08:47) (96% - 100%)    Parameters below as of 25 Oct 2022 08:47  Patient On (Oxygen Delivery Method): room air      Daily Height in cm: 157.48 (24 Oct 2022 22:21)    Daily     PHYSICAL EXAM:     GENERAL:  Appears stated age, well-groomed, well-nourished, no distress  HEENT:  NC/AT,  conjunctivae clear and pink  CHEST:  Full & symmetric excursion, no increased effort  HEART:  Regular rhythm, S1, S2, no murmur/rub/S3/S4  ABDOMEN:  Soft, +RUQ-tenderness, non-distended  EXTREMITIES:  no cyanosis,clubbing or edema  SKIN:  No rash/erythema/ecchymoses/petechiae/wounds/abscess/warm/dry  NEURO:  Alert, orientedx3      LABS:                        7.7    9.12  )-----------( 129      ( 25 Oct 2022 02:43 )             24.4     10-25    138  |  96  |  20  ----------------------------<  79  4.2   |  28  |  3.64<H>    Ca    9.0      25 Oct 2022 02:43    TPro  7.4  /  Alb  4.1  /  TBili  1.0  /  DBili  x   /  AST  19  /  ALT  23  /  AlkPhos  83  10-25    LIVER FUNCTIONS - ( 25 Oct 2022 02:43 )  Alb: 4.1 g/dL / Pro: 7.4 g/dL / ALK PHOS: 83 U/L / ALT: 23 U/L / AST: 19 U/L / GGT: x           PT/INR - ( 25 Oct 2022 03:54 )   PT: 14.2 sec;   INR: 1.23 ratio         PTT - ( 25 Oct 2022 03:54 )  PTT:30.1 sec    Amylase Serum--      Lipase [id number]       Ammonia--      Imaging:       ACC: [id number] EXAM:  CT ABDOMEN AND PELVIS IC                          PROCEDURE DATE:  10/25/2022          INTERPRETATION:  CLINICAL INFORMATION: Abdominal pain. Cholecystectomy   evaluate for abscess.    COMPARISON: CT abdomen pelvis 9/30/2022    CONTRAST/COMPLICATIONS:  IV Contrast: Omnipaque 350  90 cc administered   10 cc discarded  Oral Contrast: NONE  Complications: None reported at time of study completion    PROCEDURE:  CT of the Abdomen and Pelvis was performed.  Sagittal and coronal reformats were performed.    FINDINGS:  LOWER CHEST: Central line tip in right atrium. Mild bibasilar   atelectasis. Trace right pleural fluid.    LIVER: Within normal limits.  BILE DUCTS: Pneumobilia, unchanged.  GALLBLADDER: Cholecystectomy. A tubular structure measuring 11 mm in   diameter and containing what appears to be small gallstones is seen in   the gallbladder fossa. This may represent a remnant cystic duct. There is   a trace amount of minimal high density fluid within the gallbladder fossa.  SPLEEN: Splenomegaly.  PANCREAS: Within normal limits.  ADRENALS: Within normal limits.  KIDNEYS/URETERS: Atrophic kidneys.    BLADDER: Within normal limits.  REPRODUCTIVE ORGANS: Uterus and adnexa within normal limits.    BOWEL: No bowel obstruction. Appendix is normal.  PERITONEUM: Small pelvic mildly high density ascites.  VESSELS: Abdominal varices unchanged.  RETROPERITONEUM/LYMPH NODES: No lymphadenopathy.  ABDOMINAL WALL: Tiny fat-containing umbilical hernia.  BONES: Within normal limits.    IMPRESSION:    Cholecystectomy. A tubular structure measuring 11 mm in diameter and   containing what appears to be small gallstones is seen in the gallbladder   fossa. This may represent a remnant cystic duct. There is a trace amount   of minimal high density fluid within the gallbladder fossa.

## 2022-10-25 NOTE — ED ADULT NURSE REASSESSMENT NOTE - NS ED NURSE REASSESS COMMENT FT1
Placement of port confirmed via chest x-ray. Okay to access as per MD orders. Port accessed using sterile technique. 20gauge needle in place. +blood return. Sterile dressing applied to site. Patient tolerated well. Specimens collected and sent to lab. Maintenance fluids running on pump to keep line patent. Safety and comfort of patient maintained.

## 2022-10-25 NOTE — ED PROVIDER NOTE - PHYSICAL EXAMINATION
General: appears chronically ill, AOx3  Skin: no rash, no pallor  Head: normocephalic, atraumatic  Eyes: clear conjunctiva, EOMI  ENMT: airway patent, no nasal discharge  Cardiovascular: normal rate, normal rhythm, S1/S2, LUE AV fistula with palpable thrill   Pulmonary: clear to auscultation bilaterally, no rales, rhonchi, or wheeze  Abdomen: guarding, well-healing surgical incision at ruq, diffuse tenderness to palpation   Musculoskeletal: moving extremities well, no deformity  Psych: normal mood, normal affect

## 2022-10-25 NOTE — CONSULT NOTE ADULT - SUBJECTIVE AND OBJECTIVE BOX
Patient is a 62y old  Female who presents with a chief complaint of Abdominal pain (25 Oct 2022 14:59)      HPI:   62 year old female with history of myelofibrosis, polycythemia vera, ESRD on MWF schedule via LUE AVF (last dialysis W), portal HTN c/b IGV1, subclavian port for transfusions, gastric varices, gallstones, ERCP 9/22/2022 for sludge and stones s/p covered metal biliary stent 10 mm x 4 cm c/b post ERCP pancreatitis who presents with RUQ pain that began 10/22. Reports she has never had pain like this before, and it is new since her CC[facility]. Reports a burning sensation around her incision site and reports when she is riding in a car or moving around, it feels like her incision site is going to burst open. Reports some chills 10/24 but o/w denies any post-prandial pain, N/V/D/C, changes in appetite, fevers. Currently pt reporting RUQ pain, but afebrile, hemodynamically stable, and normal WBC and Tbili/liver enyzmes normal. Had CT A/P showing unchanged pneumobilia, in bile ducts; s/p cholecystectomy; tubular structure measuring 11 mm in diameter and containing what appears to be small gallstones is seen in the gallbladder fossa, may represent a remnant cystic duct; trace amount of minimal high density fluid within the gallbladder fossa. [facility] consulted for eval of RUQ pain after biliary stent removal.     Of note was recently admitted earlier this month with RUQ/epigastric pain and fevers. Underwent open cholecystectomy on 10/12/2022 for acute cholecystitis. Had biliary stent removed 10/17 by [facility].   Pt presents with 2 days of worsening right upper quadrant abdominal pain, diarrhea, nausea,  (25 Oct 2022 14:59)     CT A/P (10/25) s/p Cholecystectomy with tubular structure measuring 11 mm in diameter and   containing what appears to be small gallstones is seen in the gallbladder   fossa. This may represent a remnant cystic duct. There is a trace amount   of minimal high density fluid within the gallbladder fossa.    prior hospital charts reviewed [  ]  primary team notes reviewed [  ]  other consultant notes reviewed [  ]    PAST MEDICAL & SURGICAL HISTORY:  Polycythemia vera  and secondary myelofibrosis      Peptic ulcer disease      Hypertension      Splenomegaly  2015      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      Pancreatic cyst      History of myelofibrosis      History of myelofibrosis      Peritoneal dialysis catheter in place  Placed 8/9/2017      Hemoperitoneum as complication of peritoneal dialysis  s/p removal 9/18      AV fistula  Placed 9/18          Allergies  No Known Allergies    ANTIMICROBIALS (past 90 days)  MEDICATIONS  (STANDING):      ANTIMICROBIALS:    fluconAZOLE   Tablet 200 daily  piperacillin/tazobactam IVPB. 3.375 once  piperacillin/tazobactam IVPB.- 3.375 once    OTHER MEDS: MEDICATIONS  (STANDING):  pantoprazole    Tablet 40 before breakfast    SOCIAL HISTORY:   hx smoking  non-smoker    FAMILY HISTORY:  Family history of hypertension in mother    Family history of malignant neoplasm (Grandparent)  head and neck in father    FH: cirrhosis (Sibling)      REVIEW OF SYSTEMS  [  ] ROS unobtainable because:    [  ] All other systems negative except as noted below:	    Constitutional:  [ ] fever [ ] chills  [ ] weight loss  [ ] weakness  Skin:  [ ] rash [ ] phlebitis	  Eyes: [ ] icterus [ ] pain  [ ] discharge	  ENMT: [ ] sore throat  [ ] thrush [ ] ulcers [ ] exudates  Respiratory: [ ] dyspnea [ ] hemoptysis [ ] cough [ ] sputum	  Cardiovascular:  [ ] chest pain [ ] palpitations [ ] edema	  Gastrointestinal:  [ ] nausea [ ] vomiting [ ] diarrhea [ ] constipation [ ] pain	  Genitourinary:  [ ] dysuria [ ] frequency [ ] hematuria [ ] discharge [ ] flank pain  [ ] incontinence  Musculoskeletal:  [ ] myalgias [ ] arthralgias [ ] arthritis  [ ] back pain  Neurological:  [ ] headache [ ] seizures  [ ] confusion/altered mental status  Psychiatric:  [ ] anxiety [ ] depression	  Hematology/Lymphatics:  [ ] lymphadenopathy  Endocrine:  [ ] adrenal [ ] thyroid  Allergic/Immunologic:	 [ ] transplant [ ] seasonal    Vital Signs Last 24 Hrs  T(F): 98.6 (10-25-22 @ 13:00), Max: 99.2 (10-24-22 @ 22:21)  Vital Signs Last 24 Hrs  HR: 77 (10-25-22 @ 13:00) (70 - 101)  BP: 157/81 (10-25-22 @ 13:00) (138/65 - 157/81)  RR: 18 (10-25-22 @ 13:00)  SpO2: 97% (10-25-22 @ 13:00) (96% - 100%)  Wt(kg): --    PHYSICAL EXAMINATION:  General: Alert and Awake, NAD  HEENT: PERRL, EOMI, No subconjunctival hemorrhages, Oropharynx Clear, MMM  Neck: Supple, No WALI  Cardiac: RRR, No M/R/G  Resp: CTAB, No Wh/Rh/Ra  Abdomen: NBS, NT/ND, No HSM, No rigidity or guarding  MSK: No LE edema. No stigmata of IE. No evidence of phlebitis. No evidence of synovitis.  : No topete  Skin: No rashes or lesions. Skin is warm and dry to the touch.   Neuro: Alert and Awake. CN 2-12 Grossly intact. Moves all four extremities spontaneously.  Psych: Calm, Pleasant, Cooperative                          7.7    9.12  )-----------( 129      ( 25 Oct 2022 02:43 )             24.4     10-25    138  |  96  |  20  ----------------------------<  79  4.2   |  28  |  3.64<H>    Ca    9.0      25 Oct 2022 02:43    TPro  7.4  /  Alb  4.1  /  TBili  1.0  /  DBili  x   /  AST  19  /  ALT  23  /  AlkPhos  83  10-25    MICROBIOLOGY:                RADIOLOGY:    <The imaging below has been reviewed and visualized by me independently. Findings as detailed in report below>     Patient is a 62y old  Female who presents with a chief complaint of Abdominal pain (25 Oct 2022 14:59)    HPI:    62 year old female with history of myelofibrosis, polycythemia vera, ESRD on MWF schedule via LUE AVF (last dialysis W), portal HTN c/b IGV1, subclavian port for transfusions, gastric varices, gallstones, ERCP 9/22/2022 for sludge and stones s/p covered metal biliary stent 10 mm x 4 cm c/b post ERCP pancreatitis who presents with RUQ pain that began 10/22. Reports she has never had pain like this before, and it is new since her [facility]. Reports a burning sensation around her incision site and reports when she is riding in a car or moving around, it feels like her incision site is going to burst open. Reports some chills 10/24 but o/w denies any post-prandial pain, N/V/D/C, changes in appetite, fevers. Currently pt reporting RUQ pain, but afebrile, hemodynamically stable, and normal WBC and Tbili/liver enyzmes normal. Had CT A/P showing unchanged pneumobilia, in bile ducts; s/p cholecystectomy; tubular structure measuring 11 mm in diameter and containing what appears to be small gallstones is seen in the gallbladder fossa, may represent a remnant cystic duct; trace amount of minimal high density fluid within the gallbladder fossa. [facility] consulted for eval of RUQ pain after biliary stent removal.     Of note was recently admitted earlier this month with RUQ/epigastric pain and fevers. Underwent open cholecystectomy on 10/12/2022 for acute cholecystitis. Had biliary stent removed 10/17 by [facility].   Pt presents with 2 days of worsening right upper quadrant abdominal pain, diarrhea, nausea,  (25 Oct 2022 14:59)     CT A/P (10/25) s/p Cholecystectomy with tubular structure measuring 11 mm in diameter and containing what appears to be small gallstones is seen in the gallbladder fossa. This may represent a remnant cystic duct. There is a trace amount   of minimal high density fluid within the gallbladder fossa.    prior hospital charts reviewed [  ]  primary team notes reviewed [ x ]  other consultant notes reviewed [ x ]    PAST MEDICAL & SURGICAL HISTORY:  Polycythemia vera  and secondary myelofibrosis      Peptic ulcer disease      Hypertension      Splenomegaly  2015      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      Pancreatic cyst      History of myelofibrosis      History of myelofibrosis      Peritoneal dialysis catheter in place  Placed 8/9/2017      Hemoperitoneum as complication of peritoneal dialysis  s/p removal 9/18      AV fistula  Placed 9/18          Allergies  No Known Allergies    ANTIMICROBIALS (past 90 days)  MEDICATIONS  (STANDING):      ANTIMICROBIALS:    fluconAZOLE   Tablet 200 daily  piperacillin/tazobactam IVPB. 3.375 once  piperacillin/tazobactam IVPB.- 3.375 once    OTHER MEDS: MEDICATIONS  (STANDING):  pantoprazole    Tablet 40 before breakfast    SOCIAL HISTORY: no smoking or etoh use.     FAMILY HISTORY:  Family history of hypertension in mother    Family history of malignant neoplasm (Grandparent)  head and neck in father    FH: cirrhosis (Sibling)      REVIEW OF SYSTEMS  [  ] ROS unobtainable because:    [ x] All other systems negative except as noted below:	    Constitutional:  [ ] fever [ ] chills  [ ] weight loss  [ ] weakness  Skin:  [ ] rash [ ] phlebitis	  Eyes: [ ] icterus [ ] pain  [ ] discharge	  ENMT: [ ] sore throat  [ ] thrush [ ] ulcers [ ] exudates  Respiratory: [ ] dyspnea [ ] hemoptysis [ ] cough [ ] sputum	  Cardiovascular:  [ ] chest pain [ ] palpitations [ ] edema	  Gastrointestinal:  [ ] nausea [ ] vomiting [ ] diarrhea [ ] constipation [x] pain	  Genitourinary:  [ ] dysuria [ ] frequency [ ] hematuria [ ] discharge [ ] flank pain  [ ] incontinence  Musculoskeletal:  [ ] myalgias [ ] arthralgias [ ] arthritis  [ ] back pain  Neurological:  [ ] headache [ ] seizures  [ ] confusion/altered mental status  Psychiatric:  [ ] anxiety [ ] depression	  Hematology/Lymphatics:  [ ] lymphadenopathy  Endocrine:  [ ] adrenal [ ] thyroid  Allergic/Immunologic:	 [ ] transplant [ ] seasonal    Vital Signs Last 24 Hrs  T(F): 98.6 (10-25-22 @ 13:00), Max: 99.2 (10-24-22 @ 22:21)  Vital Signs Last 24 Hrs  HR: 77 (10-25-22 @ 13:00) (70 - 101)  BP: 157/81 (10-25-22 @ 13:00) (138/65 - 157/81)  RR: 18 (10-25-22 @ 13:00)  SpO2: 97% (10-25-22 @ 13:00) (96% - 100%)  Wt(kg): --    PHYSICAL EXAMINATION:  General: Alert and Awake, NAD  HEENT: PERRL, EOMI  Neck: Supple  Cardiac: RRR, No M/R/G  Resp: CTAB, No Wh/Rh/Ra  Abdomen: Surgical site is well healed, RUQ tenderness to palpation, NBS  MSK: No LE edema. No calf tenderness  Vasc: Permacath (No surrounding erythema, drainage or tenderness to palpation)  : No topete  Skin: No rashes or lesions. Skin is warm and dry to the touch.   Neuro: Alert and Awake. CN 2-12 Grossly intact. Moves all four extremities spontaneously.  Psych: Calm, Pleasant, Cooperative                          7.7    9.12  )-----------( 129      ( 25 Oct 2022 02:43 )             24.4     10-25    138  |  96  |  20  ----------------------------<  79  4.2   |  28  |  3.64<H>    Ca    9.0      25 Oct 2022 02:43    TPro  7.4  /  Alb  4.1  /  TBili  1.0  /  DBili  x   /  AST  19  /  ALT  23  /  AlkPhos  83  10-25    MICROBIOLOGY:    COVID-19 PCR (10.25.22 @ 02:43)   COVID-19 PCR: NotDetec:     RADIOLOGY:    <The imaging below has been reviewed and visualized by me independently. Findings as detailed in report below>    `< from: CT Abdomen and Pelvis w/ IV Cont (10.25.22 @ 05:21) >  IMPRESSION:    Cholecystectomy. A tubular structure measuring 11 mm in diameter and   containing what appears to be small gallstones is seen in the gallbladder   fossa. This may represent a remnant cystic duct. There is a trace amount   of minimal high density fluid within the gallbladder fossa.    < end of copied text >

## 2022-10-25 NOTE — H&P ADULT - ASSESSMENT
62 year old female with history of myelofibrosis, polycythemia vera, ESRD on MWF schedule via LUE AVF (last dialysis W), portal HTN c/b IGV1, subclavian port for transfusions, gastric varices, gallstones, ERCP 9/22/2022 for sludge and stones s/p covered metal biliary stent 10 mm x 4 cm c/b post ERCP pancreatitis who presents with RUQ pain that began 10/22.      RUQ abd pain- could be 2/2 retained stone in remnant cystic duct vs bile leak s/p CCY vs early post-cholecystectomy syndrome  NPO  ID consult  start zosyn/ fluc  Cultures sent f/u results  CT abd done  [facility] consult- recs MRCP   I/Os  Case discussed w/ Dr Dagher

## 2022-10-25 NOTE — PATIENT PROFILE ADULT - NSPROGENSOURCEINFO_GEN_A_NUR
-- Message is from the [facility]--    Reason for Call: Patient has a procedure scheduled for 3/14 in the morning and would like to reschedule it for 3/15. Please assist.     Caller Information       Type Contact Phone    03/13/2019 08:23 AM Phone (Incoming) Martha Katz (Self) [phone] (M)          Alternative phone number: [phone]    Turnaround time given to caller:   \"This message will be sent to [state Provider's name]. The clinical team will fulfill your request as soon as they review your message.\"     patient/family

## 2022-10-25 NOTE — H&P ADULT - NSHPREVIEWOFSYSTEMS_GEN_ALL_CORE
Review of systems  Gen: No weight changes, fatigue, fevers/chills, weakness  Skin: No rashes  Head/Eyes/Ears/Mouth: No headache; Normal hearing; Normal vision w/o blurriness; No sinus pain/discomfort, sore throat  Respiratory: No dyspnea, cough, wheezing, hemoptysis  CV: No chest pain, PND, orthopnea  GI: c/o RUQ abd pain, pos nausea/ vomiting.  No diarrhea, constipation, melena, hematochezia  : No increased frequency, dysuria, hematuria, nocturia  MSK: No joint pain/swelling; no back pain; no edema  Neuro: No dizziness/lightheadedness, weakness, seizures, numbness, tingling  Heme: No easy bruising or bleeding  Endo: No heat/cold intolerance  Psych: No significant nervousness, anxiety, stress, depression  All other systems were reviewed and are negative, except as noted.

## 2022-10-25 NOTE — PATIENT PROFILE ADULT - FALL HARM RISK - HARM RISK INTERVENTIONS
Assistance with ambulation/Assistance OOB with selected safe patient handling equipment/Communicate Risk of Fall with Harm to all staff/Discuss with provider need for PT consult/Monitor gait and stability/Provide patient with walking aids - walker, cane, crutches/Reinforce activity limits and safety measures with patient and family/Sit up slowly, dangle for a short time, stand at bedside before walking/Tailored Fall Risk Interventions/Use of alarms - bed, chair and/or voice tab/Visual Cue: Yellow wristband and red socks/Bed in lowest position, wheels locked, appropriate side rails in place/Call bell, personal items and telephone in reach/Instruct patient to call for assistance before getting out of bed or chair/Non-slip footwear when patient is out of bed/[place] to call system/Physically safe environment - no spills, clutter or unnecessary equipment/Purposeful Proactive Rounding/Room/bathroom lighting operational, light cord in reach

## 2022-10-25 NOTE — ED PROVIDER NOTE - OBJECTIVE STATEMENT
Ky Fagan, PGY-3- 62 year old female with a pmhx of PV (JAK2+), ESRD on HD, HTN, noncirrhotic portal HTN 2/2 Nodular Regenerative Hyperplasia complicated by gastric varices, choledocholithiasis s/p ERCP 9/22 with stent, c/b post ERCP pancreatitis readmitted to hospital with persistent epigastric abdominal pain on exam, s/p open cholecystectomy, found to have purulent cholecystitis, s/p cholecystectomy on 10/12, here for persistent RUQ pain. Pt seen in office today and was advised to have CT scan. Pt s/p staple removal in office. Previous bile cxs growing Kleb pneumo and yeast (candida), previously on Caspo and zosyn. No fevers but has felt tacitly warm. No vomiting, diarrhea. On HD, has LUE AV fistula.  Daughter translating at pt request.

## 2022-10-25 NOTE — H&P ADULT - NSHPLABSRESULTS_GEN_ALL_CORE
LABS:                        7.7    9.12  )-----------( 129      ( 25 Oct 2022 02:43 )             24.4     10-25    138  |  96  |  20  ----------------------------<  79  4.2   |  28  |  3.64<H>    Ca    9.0      25 Oct 2022 02:43    TPro  7.4  /  Alb  4.1  /  TBili  1.0  /  DBili  x   /  AST  19  /  ALT  23  /  AlkPhos  83  10-25    PT/INR - ( 25 Oct 2022 03:54 )   PT: 14.2 sec;   INR: 1.23 ratio         PTT - ( 25 Oct 2022 03:54 )  PTT:30.1 sec    CAPILLARY BLOOD GLUCOSE        LIVER FUNCTIONS - ( 25 Oct 2022 02:43 )  Alb: 4.1 g/dL / Pro: 7.4 g/dL / ALK PHOS: 83 U/L / ALT: 23 U/L / AST: 19 U/L / GGT: x                 Cultures:

## 2022-10-25 NOTE — ED PROVIDER NOTE - CLINICAL SUMMARY MEDICAL DECISION MAKING FREE TEXT BOX
Dr. Selby's Note: Patient with complicated medical history including ESRD on dialysis Monday Wednesday Friday, pancreatitis, recent cholecystectomy, presents with 2 days of worsening right upper quadrant abdominal pain, diarrhea, nausea, fever.  On exam, patient is in no acute distress, but exhibits significant tenderness in the right upper quadrant with guarding, is tachycardic to about 100, afebrile.  Due to recent surgery and degree of tenderness, we will obtain a CT to assess for postsurgical complication such as infection versus hematoma versus abscess, and check labs to assess for pancreatitis or other acute intra-abdominal process.

## 2022-10-25 NOTE — CONSULT NOTE ADULT - ASSESSMENT
MIHYEON HU is a 62 year old Tamaz[place]-speaking female with history of myelofibrosis, polycythemia vera, ESRD on MWF schedule via LUE AVF (last dialysis W), portal HTN c/b IGV1, subclavian port for transfusions, gastric varices, gallstones, ERCP 9/22/2022 for sludge and stones s/p covered metal biliary stent 10 mm x 4 cm c/b post ERCP pancreatitis who presents with RUQ pain. Had biliary stent removed 10/17 by advanced GI. Of note was recently admitted earlier this month with RUQ/epigastric pain and fevers. Underwent open cholecystectomy on 10/12/2022 for acute cholecystitis. Currently pt reporting RUQ pain, but afebrile, hemodynamically stable, and normal WBC and Tbili/liver enyzmes normal. Had CT A/P showing unchanged pneumobilia, in bile ducts; s/p cholecystectomy; tubular structure measuring 11 mm in diameter and containing what appears to be small gallstones is seen in the gallbladder fossa, may represent a remnant cystic duct; trace amount of minimal high density fluid within the gallbladder fossa. [facility] consulted for eval of RUQ pain after biliary stent removal.     Impression:  # Recurrent RUQ abd pain- could be 2/2 retained stone in remnant cystic duct vs bile leak s/p CCY  # Acute cholecystitis s/p CCY on 10/12/2022  # History of biliary sludge and stones s/p 10mm x 4cm covered metal stent in CBD placed 9/22/2022 with removal 10/17/22  # IGV1    Recommendations:  -obtain MRCP  -will consider ERCP based on MRCP findings  -trend clinical symptoms, exam findings, vital signs, CBC, CMP, INR  -rest of care per primary team      Note incomplete until finalized by attending signature/attestation.    Lacey Sandoval MD  GI Fellow, PGY-5  Available via [employer] Teams    NON-URGENT CONSULTS:  Please email [email or web address]  AT NIGHT AND ON WEEKENDS:  Contact on-call GI fellow via answering service ([phone]) from 5pm-8am and on weekends/holidays  MONDAY-FRIDAY 8AM-5PM:  Pager# [phone] ([facility]) or [phone] ([facility])  GI Phone# [phone] ([facility])     MIHYEON HU is a 62 year old female with history of myelofibrosis, polycythemia vera, ESRD on MWF schedule via LUE AVF (last dialysis W), portal HTN c/b IGV1, subclavian port for transfusions, gastric varices, gallstones, ERCP 9/22/2022 for sludge and stones s/p covered metal biliary stent 10 mm x 4 cm c/b post ERCP pancreatitis who presents with RUQ pain that began 10/22. Reports she has never had pain like this before, and it is new since her CCY (done on 10/12/22). Reports a burning sensation around her incision site and reports when she is riding in a car or moving around, it feels like her incision site is going to burst open. Reports some chills 10/24 but o/w denies any post-prandial pain, N/V/D/C, changes in appetite, fevers. Currently pt reporting RUQ pain, but afebrile, hemodynamically stable, and normal WBC and Tbili/liver enyzmes normal. Had CT A/P showing unchanged pneumobilia, in bile ducts; s/p cholecystectomy; tubular structure measuring 11 mm in diameter and containing what appears to be small gallstones is seen in the gallbladder fossa, may represent a remnant cystic duct; trace amount of minimal high density fluid within the gallbladder fossa. [facility] consulted for eval of RUQ pain after biliary stent removal.     Impression:  # Recurrent RUQ abd pain- could be 2/2 retained stone in remnant cystic duct vs bile leak s/p CCY vs early post-cholecystectomy syndrome  # Acute cholecystitis s/p CCY on 10/12/2022  # History of biliary sludge and stones s/p 10mm x 4cm covered metal stent in CBD placed 9/22/2022 with removal 10/17/22  # IGV1    Recommendations:  -obtain MRCP  -will consider ERCP based on MRCP findings  -trend clinical symptoms, exam findings, vital signs, CBC, CMP, INR  -rest of care per primary team      Note incomplete until finalized by attending signature/attestation.    Lacey Sandoval MD  GI Fellow, PGY-5  Available via [employer] Teams    NON-URGENT CONSULTS:  Please email [email or web address]  AT NIGHT AND ON WEEKENDS:  Contact on-call GI fellow via answering service ([phone]) from 5pm-8am and on weekends/holidays  MONDAY-FRIDAY 8AM-5PM:  Pager# [phone] ([facility]) or [phone] ([facility])  GI Phone# [phone] ([facility])     MIHYEON HU is a 62 year old female with history of myelofibrosis, polycythemia vera, ESRD on MWF schedule via LUE AVF (last dialysis W), portal HTN c/b IGV1, subclavian port for transfusions, gastric varices, gallstones, ERCP 9/22/2022 for sludge and stones s/p covered metal biliary stent 10 mm x 4 cm c/b post ERCP pancreatitis who presents with RUQ pain that began 10/22. Reports she has never had pain like this before, and it is new since her CCY (done on 10/12/22). Reports a burning sensation around her incision site and reports when she is riding in a car or moving around, it feels like her incision site is going to burst open. Reports some chills 10/24 but o/w denies any post-prandial pain, N/V/D/C, changes in appetite, fevers. Currently pt reporting RUQ pain, but afebrile, hemodynamically stable, and normal WBC and Tbili/liver enyzmes normal. Had CT A/P showing unchanged pneumobilia, in bile ducts; s/p cholecystectomy; tubular structure measuring 11 mm in diameter and containing what appears to be small gallstones is seen in the gallbladder fossa, may represent a remnant cystic duct; trace amount of minimal high density fluid within the gallbladder fossa. [facility] consulted for eval of RUQ pain after biliary stent removal.     Impression:  # Recurrent RUQ abd pain- could be 2/2 retained stone in remnant cystic duct vs bile leak s/p CCY vs early post-cholecystectomy syndrome vs incision site inflammation  # Acute cholecystitis s/p CCY on 10/12/2022  # History of biliary sludge and stones s/p 10mm x 4cm covered metal stent in CBD placed 9/22/2022 with removal 10/17/22  # IGV1    Recommendations:  -obtain MRCP  -will consider EUS/ERCP based on MRCP findings  -trend clinical symptoms, exam findings, vital signs, CBC, CMP, INR  -rest of care per primary team      Note incomplete until finalized by attending signature/attestation.    Lacey Sandoval MD  GI Fellow, PGY-5  Available via [employer] Teams    NON-URGENT CONSULTS:  Please email [email or web address]  AT NIGHT AND ON WEEKENDS:  Contact on-call GI fellow via answering service ([phone]) from 5pm-8am and on weekends/holidays  MONDAY-FRIDAY 8AM-5PM:  Pager# [phone] ([facility]) or [phone] ([facility])  GI Phone# [phone] ([facility])

## 2022-10-25 NOTE — H&P ADULT - NS ATTEND AMEND GEN_ALL_CORE FT
Abdominal pain after open vikas.  LFTs normal.  Remains on abx for bile cultures.  Gallbladder fully removed, this likely is a cystic duct remnant with some stones.  Will obtain MRCP and proceed from there.  Biliary stent is out.

## 2022-10-26 DIAGNOSIS — N18.9 CHRONIC KIDNEY DISEASE, UNSPECIFIED: ICD-10-CM

## 2022-10-26 DIAGNOSIS — N18.6 END STAGE RENAL DISEASE: ICD-10-CM

## 2022-10-26 DIAGNOSIS — E83.39 OTHER DISORDERS OF PHOSPHORUS METABOLISM: ICD-10-CM

## 2022-10-26 LAB
ALBUMIN SERPL ELPH-MCNC: 3.6 G/DL — SIGNIFICANT CHANGE UP (ref 3.3–5)
ALP SERPL-CCNC: 73 U/L — SIGNIFICANT CHANGE UP (ref 40–120)
ALT FLD-CCNC: 16 U/L — SIGNIFICANT CHANGE UP (ref 10–45)
ANION GAP SERPL CALC-SCNC: 20 MMOL/L — HIGH (ref 5–17)
AST SERPL-CCNC: 17 U/L — SIGNIFICANT CHANGE UP (ref 10–40)
BASOPHILS # BLD AUTO: 0.06 K/UL — SIGNIFICANT CHANGE UP (ref 0–0.2)
BASOPHILS NFR BLD AUTO: 0.9 % — SIGNIFICANT CHANGE UP (ref 0–2)
BILIRUB SERPL-MCNC: 1.4 MG/DL — HIGH (ref 0.2–1.2)
BUN SERPL-MCNC: 34 MG/DL — HIGH (ref 7–23)
CALCIUM SERPL-MCNC: 8.7 MG/DL — SIGNIFICANT CHANGE UP (ref 8.4–10.5)
CHLORIDE SERPL-SCNC: 95 MMOL/L — LOW (ref 96–108)
CO2 SERPL-SCNC: 19 MMOL/L — LOW (ref 22–31)
CREAT SERPL-MCNC: 5.54 MG/DL — HIGH (ref 0.5–1.3)
EGFR: 8 ML/MIN/1.73M2 — LOW
EOSINOPHIL # BLD AUTO: 0.06 K/UL — SIGNIFICANT CHANGE UP (ref 0–0.5)
EOSINOPHIL NFR BLD AUTO: 0.9 % — SIGNIFICANT CHANGE UP (ref 0–6)
GLUCOSE SERPL-MCNC: 57 MG/DL — LOW (ref 70–99)
HCT VFR BLD CALC: 22.4 % — LOW (ref 34.5–45)
HGB BLD-MCNC: 6.9 G/DL — CRITICAL LOW (ref 11.5–15.5)
LYMPHOCYTES # BLD AUTO: 0.78 K/UL — LOW (ref 1–3.3)
LYMPHOCYTES # BLD AUTO: 11.4 % — LOW (ref 13–44)
MAGNESIUM SERPL-MCNC: 3 MG/DL — HIGH (ref 1.6–2.6)
MANUAL SMEAR VERIFICATION: SIGNIFICANT CHANGE UP
MCHC RBC-ENTMCNC: 29.5 PG — SIGNIFICANT CHANGE UP (ref 27–34)
MCHC RBC-ENTMCNC: 30.8 GM/DL — LOW (ref 32–36)
MCV RBC AUTO: 95.7 FL — SIGNIFICANT CHANGE UP (ref 80–100)
METAMYELOCYTES # FLD: 0.9 % — HIGH (ref 0–0)
MONOCYTES # BLD AUTO: 0.12 K/UL — SIGNIFICANT CHANGE UP (ref 0–0.9)
MONOCYTES NFR BLD AUTO: 1.7 % — LOW (ref 2–14)
MYELOCYTES NFR BLD: 7 % — HIGH (ref 0–0)
NEUTROPHILS # BLD AUTO: 5.31 K/UL — SIGNIFICANT CHANGE UP (ref 1.8–7.4)
NEUTROPHILS NFR BLD AUTO: 77.2 % — HIGH (ref 43–77)
NRBC # BLD: 3 /100 — HIGH (ref 0–0)
PHOSPHATE SERPL-MCNC: 6 MG/DL — HIGH (ref 2.5–4.5)
PLAT MORPH BLD: NORMAL — SIGNIFICANT CHANGE UP
PLATELET # BLD AUTO: 129 K/UL — LOW (ref 150–400)
POTASSIUM SERPL-MCNC: 4.8 MMOL/L — SIGNIFICANT CHANGE UP (ref 3.5–5.3)
POTASSIUM SERPL-SCNC: 4.8 MMOL/L — SIGNIFICANT CHANGE UP (ref 3.5–5.3)
PROT SERPL-MCNC: 6.8 G/DL — SIGNIFICANT CHANGE UP (ref 6–8.3)
RBC # BLD: 2.34 M/UL — LOW (ref 3.8–5.2)
RBC # FLD: 20.4 % — HIGH (ref 10.3–14.5)
RBC BLD AUTO: SIGNIFICANT CHANGE UP
SODIUM SERPL-SCNC: 134 MMOL/L — LOW (ref 135–145)
WBC # BLD: 6.88 K/UL — SIGNIFICANT CHANGE UP (ref 3.8–10.5)
WBC # FLD AUTO: 6.88 K/UL — SIGNIFICANT CHANGE UP (ref 3.8–10.5)

## 2022-10-26 PROCEDURE — 99232 SBSQ HOSP IP/OBS MODERATE 35: CPT

## 2022-10-26 PROCEDURE — 74183 MRI ABD W/O CNTR FLWD CNTR: CPT | Mod: 26

## 2022-10-26 PROCEDURE — 99254 IP/OBS CNSLTJ NEW/EST MOD 60: CPT | Mod: GC

## 2022-10-26 RX ORDER — CALCIUM ACETATE 667 MG
667 TABLET ORAL
Refills: 0 | Status: DISCONTINUED | OUTPATIENT
Start: 2022-10-26 | End: 2022-10-27

## 2022-10-26 RX ORDER — ERYTHROPOIETIN 10000 [IU]/ML
20000 INJECTION, SOLUTION INTRAVENOUS; SUBCUTANEOUS
Refills: 0 | Status: DISCONTINUED | OUTPATIENT
Start: 2022-10-26 | End: 2022-10-27

## 2022-10-26 RX ADMIN — PIPERACILLIN AND TAZOBACTAM 25 GRAM(S): 4; .5 INJECTION, POWDER, LYOPHILIZED, FOR SOLUTION INTRAVENOUS at 00:50

## 2022-10-26 RX ADMIN — PANTOPRAZOLE SODIUM 40 MILLIGRAM(S): 20 TABLET, DELAYED RELEASE ORAL at 05:35

## 2022-10-26 RX ADMIN — PIPERACILLIN AND TAZOBACTAM 25 GRAM(S): 4; .5 INJECTION, POWDER, LYOPHILIZED, FOR SOLUTION INTRAVENOUS at 11:21

## 2022-10-26 RX ADMIN — FLUCONAZOLE 200 MILLIGRAM(S): 150 TABLET ORAL at 11:21

## 2022-10-26 NOTE — PROVIDER CONTACT NOTE (OTHER) - RECOMMENDATIONS
Ask provider if the ordered unit of PRBC can be administered this evening after MRI with RN instead of with HD as indicated in the order since HD is being rescheduled to tomorrow 10/27

## 2022-10-26 NOTE — PROGRESS NOTE ADULT - SUBJECTIVE AND OBJECTIVE BOX
Follow Up:      Interval History:    REVIEW OF SYSTEMS  [  ] ROS unobtainable because:    [  ] All other systems negative except as noted below    Constitutional:  [ ] fever [ ] chills  [ ] weight loss  [ ] weakness  Skin:  [ ] rash [ ] phlebitis	  Eyes: [ ] icterus [ ] pain  [ ] discharge	  ENMT: [ ] sore throat  [ ] thrush [ ] ulcers [ ] exudates  Respiratory: [ ] dyspnea [ ] hemoptysis [ ] cough [ ] sputum	  Cardiovascular:  [ ] chest pain [ ] palpitations [ ] edema	  Gastrointestinal:  [ ] nausea [ ] vomiting [ ] diarrhea [ ] constipation [ ] pain	  Genitourinary:  [ ] dysuria [ ] frequency [ ] hematuria [ ] discharge [ ] flank pain  [ ] incontinence  Musculoskeletal:  [ ] myalgias [ ] arthralgias [ ] arthritis  [ ] back pain  Neurological:  [ ] headache [ ] seizures  [ ] confusion/altered mental status    Allergies  No Known Allergies        ANTIMICROBIALS:  fluconAZOLE   Tablet 200 daily  piperacillin/tazobactam IVPB.. 3.375 every 12 hours      OTHER MEDS:  MEDICATIONS  (STANDING):  epoetin filiberto-epbx (RETACRIT) Injectable 20000 <User Schedule>  pantoprazole    Tablet 40 before breakfast      Vital Signs Last 24 Hrs  T(C): 36.5 (26 Oct 2022 13:00), Max: 36.9 (25 Oct 2022 17:00)  T(F): 97.7 (26 Oct 2022 13:00), Max: 98.4 (25 Oct 2022 17:00)  HR: 83 (26 Oct 2022 13:00) (74 - 84)  BP: 151/79 (26 Oct 2022 13:00) (140/76 - 151/79)  BP(mean): --  RR: 18 (26 Oct 2022 13:00) (18 - 18)  SpO2: 97% (26 Oct 2022 13:00) (95% - 97%)    Parameters below as of 26 Oct 2022 13:00  Patient On (Oxygen Delivery Method): room air        PHYSICAL EXAMINATION:  General: Alert and Awake, NAD  HEENT: PERRL, EOMI  Neck: Supple  Cardiac: RRR, No M/R/G  Resp: CTAB, No Wh/Rh/Ra  Abdomen: NBS, NT/ND, No HSM, No rigidity or guarding  MSK: No LE edema. No Calf tenderness  : No topete  Skin: No rashes or lesions. Skin is warm and dry to the touch.   Neuro: Alert and Awake. CN 2-12 Grossly intact. Moves all four extremities spontaneously.  Psych: Calm, Pleasant, Cooperative                          6.9    6.88  )-----------( 129      ( 26 Oct 2022 06:28 )             22.4       10-26    134<L>  |  95<L>  |  34<H>  ----------------------------<  57<L>  4.8   |  19<L>  |  5.54<H>    Ca    8.7      26 Oct 2022 06:29  Phos  6.0     10-26  Mg     3.0     10-26    TPro  6.8  /  Alb  3.6  /  TBili  1.4<H>  /  DBili  x   /  AST  17  /  ALT  16  /  AlkPhos  73  10-26          MICROBIOLOGY:  v  .Blood Blood-Peripheral  10-25-22   No growth to date.  --  --      .Blood Blood-Peripheral  10-25-22   No growth to date.  --  --      Bile Bile Fluid  10-12-22   Moderate Klebsiella pneumoniae  Few Gloria tropicalis "Susceptibilities not performed"  --  Klebsiella pneumoniae      .Blood Blood-Peripheral  09-30-22   No Growth Final  --  --      .Blood Blood-Peripheral  09-30-22   No Growth Final  --  --                RADIOLOGY:    <The imaging below has been reviewed and visualized by me independently. Findings as detailed in report below> Follow Up:  abdominal pain    Interval History: afebrile. no acute events.     REVIEW OF SYSTEMS  [  ] ROS unobtainable because:    [ x ] All other systems negative except as noted below    Constitutional:  [ ] fever [ ] chills  [ ] weight loss  [ ] weakness  Skin:  [ ] rash [ ] phlebitis	  Eyes: [ ] icterus [ ] pain  [ ] discharge	  ENMT: [ ] sore throat  [ ] thrush [ ] ulcers [ ] exudates  Respiratory: [ ] dyspnea [ ] hemoptysis [ ] cough [ ] sputum	  Cardiovascular:  [ ] chest pain [ ] palpitations [ ] edema	  Gastrointestinal:  [ ] nausea [ ] vomiting [ ] diarrhea [ ] constipation [x ] pain	  Genitourinary:  [ ] dysuria [ ] frequency [ ] hematuria [ ] discharge [ ] flank pain  [ ] incontinence  Musculoskeletal:  [ ] myalgias [ ] arthralgias [ ] arthritis  [ ] back pain  Neurological:  [ ] headache [ ] seizures  [ ] confusion/altered mental status    Allergies  No Known Allergies        ANTIMICROBIALS:  fluconAZOLE   Tablet 200 daily  piperacillin/tazobactam IVPB.. 3.375 every 12 hours      OTHER MEDS:  MEDICATIONS  (STANDING):  epoetin filiberto-epbx (RETACRIT) Injectable 20000 <User Schedule>  pantoprazole    Tablet 40 before breakfast      Vital Signs Last 24 Hrs  T(C): 36.5 (26 Oct 2022 13:00), Max: 36.9 (25 Oct 2022 17:00)  T(F): 97.7 (26 Oct 2022 13:00), Max: 98.4 (25 Oct 2022 17:00)  HR: 83 (26 Oct 2022 13:00) (74 - 84)  BP: 151/79 (26 Oct 2022 13:00) (140/76 - 151/79)  BP(mean): --  RR: 18 (26 Oct 2022 13:00) (18 - 18)  SpO2: 97% (26 Oct 2022 13:00) (95% - 97%)    Parameters below as of 26 Oct 2022 13:00  Patient On (Oxygen Delivery Method): room air    PHYSICAL EXAMINATION:  General: Alert and Awake, NAD  Cardiac: RRR, No M/R/G  Resp: CTAB, No Wh/Rh/Ra  Abdomen: Surgical site is well healed, RUQ tenderness to palpation, NBS  MSK: No LE edema. No calf tenderness  Vasc: Permacath (No surrounding erythema, drainage or tenderness to palpation)  Skin: No rashes or lesions. Skin is warm and dry to the touch.   Neuro: Alert and Awake. CN 2-12 Grossly intact. Moves all four extremities spontaneously.  Psych: Calm, Pleasant, Cooperative                            6.9    6.88  )-----------( 129      ( 26 Oct 2022 06:28 )             22.4       10-26    134<L>  |  95<L>  |  34<H>  ----------------------------<  57<L>  4.8   |  19<L>  |  5.54<H>    Ca    8.7      26 Oct 2022 06:29  Phos  6.0     10-26  Mg     3.0     10-26    TPro  6.8  /  Alb  3.6  /  TBili  1.4<H>  /  DBili  x   /  AST  17  /  ALT  16  /  AlkPhos  73  10-26          MICROBIOLOGY:  v  .Blood Blood-Peripheral  10-25-22   No growth to date.  --  --      .Blood Blood-Peripheral  10-25-22   No growth to date.  --  --      Bile Bile Fluid  10-12-22   Moderate Klebsiella pneumoniae  Few Gloria tropicalis "Susceptibilities not performed"  --  Klebsiella pneumoniae      .Blood Blood-Peripheral  09-30-22   No Growth Final  --  --      .Blood Blood-Peripheral  09-30-22   No Growth Final  --  --    RADIOLOGY:    <The imaging below has been reviewed and visualized by me independently. Findings as detailed in report below>    < from: CT Abdomen and Pelvis w/ IV Cont (10.25.22 @ 05:21) >  IMPRESSION:    Cholecystectomy. A tubular structure measuring 11 mm in diameter and   containing what appears to be small gallstones is seen in the gallbladder   fossa. This may represent a remnant cystic duct. There is a trace amount   of minimal high density fluid within the gallbladder fossa.    < end of copied text >

## 2022-10-26 NOTE — CONSULT NOTE ADULT - SUBJECTIVE AND OBJECTIVE BOX
[facility] DIVISION OF KIDNEY DISEASES AND HYPERTENSION -- [phone]  -- INITIAL CONSULT NOTE  --------------------------------------------------------------------------------  HPI: Pt is a 62-year-old female with significant Hx of ESRD on HD TIW (MWF), myelofibrosis, polycythemia vera, portal HTN c/b IGV1, subclavian port for transfusions, gastric varices, gallstones, ERCP 9/22/2022 for sludge and stones s/p covered metal biliary stent 10 mm x 4 cm c/b post ERCP pancreatitis who is admitted at [facility] on 10/25/22 RUQ pain for 3 days. Of note, Pt was recently admitted at [facility] (In October 2022) for similar symptoms. Nephrology team was consulted today for ESRD/HD management.     Pt. with ESRD on HD three times a week (MW). Last HD was done on Monday (10/24/22) via LUE AVF. Pt goes to [facility] dialysis unit. Follows with Dr. De León. Pt seen and examined at bedside. Reports persistent RLQ pain, although better then yesterday. Denies any fever, SOB, CP, HA, N/V, diarrhea or constipation or dizziness.     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  Pancreatic cyst  History of myelofibrosis  History of myelofibrosis  Peritoneal dialysis catheter in place  Placed 8/9/2017  Hemoperitoneum as complication of peritoneal dialysis  s/p removal 9/18    AV fistula  Placed 9/18    FAMILY HISTORY:  Family history of hypertension in mother    Family history of malignant neoplasm (Grandparent)  head and neck in father    FH: cirrhosis (Sibling)    PAST SOCIAL HISTORY:    ALLERGIES & MEDICATIONS  --------------------------------------------------------------------------------  Allergies    No Known Allergies    Intolerances    Standing Inpatient Medications  fluconAZOLE   Tablet 200 milliGRAM(s) Oral daily  pantoprazole    Tablet 40 milliGRAM(s) Oral before breakfast  piperacillin/tazobactam IVPB.. 3.375 Gram(s) IV Intermittent every 12 hours  sodium chloride 0.9%. 1000 milliLiter(s) IV Continuous <Continuous>    PRN Inpatient Medications      REVIEW OF SYSTEMS  --------------------------------------------------------------------------------  Gen: No fevers/chills  Skin: No rashes  Head/Eyes/Ears: Normal hearing,   Respiratory: No dyspnea, cough  CV: No chest pain  GI: See HPI  : No dysuria, hematuria  MSK: No  edema  Heme: No easy bruising or bleeding  Psych: No significant depression    All other systems were reviewed and are negative, except as noted.    VITALS/PHYSICAL EXAM  --------------------------------------------------------------------------------  T(C): 36.8 (10-26-22 @ 05:00), Max: 37 (10-25-22 @ 13:00)  HR: 78 (10-26-22 @ 05:00) (74 - 84)  BP: 142/79 (10-26-22 @ 05:00) (140/76 - 157/81)  RR: 18 (10-26-22 @ 05:00) (18 - 18)  SpO2: 97% (10-26-22 @ 05:00) (95% - 97%)  Wt(kg): --  Height (cm): 157.5 (10-24-22 @ 22:21)  Weight (kg): 56 (10-24-22 @ 22:21)  BMI (kg/m2): 22.6 (10-24-22 @ 22:21)  BSA (m2): 1.56 (10-24-22 @ 22:21)    10-25-22 @ 07:01  -  10-26-22 @ 07:00  --------------------------------------------------------  IN: 460 mL / OUT: 0 mL / NET: 460 mL    10-26-22 @ 07:01  -  10-26-22 @ 11:55  --------------------------------------------------------  IN: 30 mL / OUT: 0 mL / NET: 30 mL    Physical Exam:  	Gen: NAD, resting comfortably  	HEENT: MMM  	Pulm: CTA B/L  	CV: S1S2  	Abd: Soft, +BS , mild tenderness at RLQ scar site  	Ext: No LE edema B/L  	Neuro: Awake  	Skin: Warm and dry  	Vascular access: LUE AVF +, palpable thrill & bruit +    LABS/STUDIES  --------------------------------------------------------------------------------              6.9    6.88  >-----------<  129      [10-26-22 @ 06:28]              22.4     134  |  95  |  34  ----------------------------<  57      [10-26-22 @ 06:29]  4.8   |  19  |  5.54        Ca     8.7     [10-26-22 @ 06:29]      Mg     3.0     [10-26-22 @ 06:29]      Phos  6.0     [10-26-22 @ 06:29]    TPro  6.8  /  Alb  3.6  /  TBili  1.4  /  DBili  x   /  AST  17  /  ALT  16  /  AlkPhos  73  [10-26-22 @ 06:29]    PT/INR: PT 14.2 , INR 1.23       [10-25-22 @ 03:54]  PTT: 30.1       [10-25-22 @ 03:54]    Creatinine Trend:  SCr 5.54 [10-26 @ 06:29]  SCr 3.64 [10-25 @ 02:43]  SCr 5.27 [10-18 @ 07:05]  SCr 3.30 [10-17 @ 13:55]  SCr 7.27 [10-17 @ 06:21]

## 2022-10-26 NOTE — PROGRESS NOTE ADULT - ASSESSMENT
62 year old female with history of myelofibrosis, polycythemia vera, ESRD on MWF schedule via LUE AVF (last dialysis W), portal HTN c/b IGV1, subclavian port for transfusions, gastric varices, gallstones, ERCP 9/22/2022 for sludge and stones s/p covered metal biliary stent 10 mm x 4 cm c/b post ERCP pancreatitis who presents with RUQ pain that began 10/22.     Given microabscess finding on 10/4 MRCP was discharged to complete total 4 weeks of antibiotics / antifungals.   Was planned to complete Fluconazole on 11/10/22 and Augmentin on 10/28/22    CT A/P (10/25) s/p Cholecystectomy with tubular structure measuring 11 mm in diameter and containing what appears to be small gallstones is seen in the gallbladder fossa. This may represent a remnant cystic duct. There is a trace amount   of minimal high density fluid within the gallbladder fossa.    #Liver Abscess (History of), Abdominal Pain, Abnormal CT A/P  --Continue Zosyn and Fluconazole pending workup  --Follow up on MRCP  --Continue to follow CBC with diff  --Continue to follow transaminases  --Continue to follow temperature curve  --Follow up on preliminary blood cultures    I will continue to follow. Please feel free to contact me with any further questions.    Juancho Kelly M.D.  [facility] Division of Infectious Disease  8AM-5PM Monday - Friday: Available on [employer] Teams  After Hours and Holidays (or if no response on Microsoft Teams): Please contact the Infectious Diseases Office at [phone]

## 2022-10-26 NOTE — CONSULT NOTE ADULT - PROBLEM SELECTOR RECOMMENDATION 3
Pt with hyperphosphatemia in the setting of ESRD. Serum phosphorus above target range. Start phosphate binders (phoslo 667 mg PO TID with meals) for now. Monitor serum phosphorus.    If you have any questions, please feel free to contact me  Talon Mckeon  Nephrology Fellow  [phone]/ [employer] Teams(Preferred)  (After 5pm or on weekends please page the on-call fellow)

## 2022-10-26 NOTE — PROVIDER CONTACT NOTE (OTHER) - BACKGROUND
Patient admitted for RUQ epigastric pain after open vikas in recent admission. Patient found to have hemoglobin of 6.9 today.

## 2022-10-26 NOTE — PROVIDER CONTACT NOTE (OTHER) - SITUATION
Patient is ordered for 1 unit PRBC to be given during HD this evening 10/26. HD was rescheduled to tomorrow 10/27.

## 2022-10-26 NOTE — CONSULT NOTE ADULT - PROBLEM SELECTOR RECOMMENDATION 2
Patient with anemia in the setting of ESRD. Hemoglobin below target range. Will Continue with INDER (Epo 20,000 units IV TIW with HD). Blood transfusion per primary team. Monitor hemoglobin

## 2022-10-26 NOTE — PROGRESS NOTE ADULT - ASSESSMENT
62 year old female with history of myelofibrosis, polycythemia vera, ESRD on MWF schedule via [facility] AVF (last dialysis W), portal HTN c/b IGV1, subclavian port for transfusions, gastric varices, gallstones, ERCP 9/22/2022 for sludge and stones s/p covered metal biliary stent 10 mm x 4 cm c/b post ERCP pancreatitis who presents with RUQ pain that began 10/22.      RUQ abd pain- could be 2/2 retained stone in remnant cystic duct vs bile leak s/p CCY vs early post-cholecystectomy syndrome  Appreciate advance GI consult- MRCP today  NPO for MRCP, restart CLD after  HD today  continue zosyn/ fluc  Cultures sent f/u results  I/Os   62 year old female with history of myelofibrosis, polycythemia vera, ESRD on MWF schedule via [facility] (last dialysis W), portal HTN c/b IGV1, subclavian port for transfusions, gastric varices, gallstones, ERCP 9/22/2022 for sludge and stones s/p covered metal biliary stent 10 mm x 4 cm c/b post ERCP pancreatitis who presents with RUQ pain that began 10/22.      RUQ abd pain- could be 2/2 retained stone in remnant cystic duct vs bile leak s/p CCY vs early post-cholecystectomy syndrome  Appreciate advance GI consult- MRCP today  NPO for MRCP, restart CLD after  HD today, H/H low, will give 1U PRBC at HD  continue zosyn/ fluc  Cultures sent f/u results  I/Os

## 2022-10-26 NOTE — CONSULT NOTE ADULT - ATTENDING COMMENTS
As above    Pt seen/examined around 7:30 PM on 10/25/22    Impression:    #1.  RUQ pain different from prior gallbladder pain  #2.  s/p ERCP for choledocholithiasis and s/p cholecystectomy for gallstones (Postop about 1.5 weeks)  #3.  CT scan demonstrates some muscle distortion at site of subcostal incision and also dilated gallbladder or cystic duct remnant with stone  #4.  ESRD on hemodialysis MWF  #5.  Myleofibrosis and polycythema vera.    Recommendations:    #1.  Follow CBC/LFTs  #2.  MRI/MRCP to clarify biliary/cystic duct/gallbladder remnant anatomy  #3.  Surgical followup to comment on CT scan/subcostal incision/muscle site
I have seen this patient with the fellow and agree with their assessment and plan. I was physically present for significant portions of the evaluation and management (E/M) service provided.  I agree with the above history, physical, and plan which I have reviewed and edited where appropriate.    Plan for HD today  Rest as per above    Yael Aranda MD  Pager   Office     Contact me directly via Microsoft Teams     (After 5 pm or on weekends please page the on-call fellow/attending, can check [email or web address] for schedule. Login is db calderon, schedule under [facility] medicine, psych, derm)

## 2022-10-26 NOTE — CONSULT NOTE ADULT - PROBLEM SELECTOR RECOMMENDATION 9
Pt. with ESRD on HD three times a week (MWF). Last HD was done on Monday (10/24/22) via [facility]. Pt goes to [facility] dialysis unit. Follows with Dr. De León. HD consent obtained and kept in pts chart. Plan for HD today after MRI (per pt). HD orders placed. Monitor labs and BP. Dose meds as per HD.

## 2022-10-26 NOTE — PROGRESS NOTE ADULT - SUBJECTIVE AND OBJECTIVE BOX
Transplant Surgery Progress Note    HPI: 62 year old female with history of myelofibrosis, polycythemia vera, ESRD on MWF schedule via [facility] (last dialysis W), portal HTN c/b IGV1, subclavian port for transfusions, gastric varices, gallstones, ERCP 9/22/2022 for sludge and stones s/p covered metal biliary stent 10 mm x 4 cm c/b post ERCP pancreatitis who presents with RUQ pain that began 10/22. Reports she has never had pain like this before, and it is new since her CCY. Reports a burning sensation around her incision site and reports when she is riding in a car or moving around, it feels like her incision site is going to burst open. Reports some chills 10/24 but o/w denies any post-prandial pain, N/V/D/C, changes in appetite, fevers. Currently pt reporting RUQ pain, but afebrile, hemodynamically stable, and normal WBC and Tbili/liver enyzmes normal. Had CT A/P showing unchanged pneumobilia, in bile ducts; s/p cholecystectomy; tubular structure measuring 11 mm in diameter and containing what appears to be small gallstones is seen in the gallbladder fossa, may represent a remnant cystic duct; trace amount of minimal high density fluid within the gallbladder fossa. Advanced GI consulted for eval of RUQ pain after biliary stent removal.     Of note was recently admitted earlier this month with RUQ/epigastric pain and fevers. Underwent open cholecystectomy on 10/12/2022 for acute cholecystitis. Had biliary stent removed 10/17 by advanced GI.   Pt presents with 2 days of worsening right upper quadrant abdominal pain, diarrhea, nausea,       Interval Events:   -no acute events  -nephrology consulted, plan for HD today after MRCP  -advanced to CLD for breakfast, NPO for lunch for MRCP      MEDICATIONS  (STANDING):  epoetin filiberto-epbx (RETACRIT) Injectable 90872 Unit(s) IV Push <User Schedule>  fluconAZOLE   Tablet 200 milliGRAM(s) Oral daily  pantoprazole    Tablet 40 milliGRAM(s) Oral before breakfast  piperacillin/tazobactam IVPB.. 3.375 Gram(s) IV Intermittent every 12 hours  sodium chloride 0.9%. 1000 milliLiter(s) (10 mL/Hr) IV Continuous <Continuous>    MEDICATIONS  (PRN):    Vitals:  T(C): 36.5 (10-26-22 @ 13:00), Max: 36.9 (10-25-22 @ 17:00)  HR: 83 (10-26-22 @ 13:00) (74 - 84)  BP: 151/79 (10-26-22 @ 13:00) (140/76 - 151/79)  RR: 18 (10-26-22 @ 13:00) (18 - 18)  SpO2: 97% (10-26-22 @ 13:00) (95% - 97%)  Wt(kg): --    10-25 @ 07:01  -  10-26 @ 07:00  --------------------------------------------------------  IN:    IV PiggyBack: 100 mL    Oral Fluid: 120 mL    sodium chloride 0.9%: 240 mL  Total IN: 460 mL    OUT:    Voided (mL): 0 mL  Total OUT: 0 mL    Total NET: 460 mL      10-26 @ 07:01  -  10-26 @ 15:22  --------------------------------------------------------  IN:    sodium chloride 0.9%: 30 mL  Total IN: 30 mL    OUT:  Total OUT: 0 mL    Total NET: 30 mL          Physical Exam:  General: WN/WD NAD  Neurology: A&Ox3, nonfocal, follows commands  Eyes: PERRLA/ EOMI  ENT/Neck: Neck supple, trachea midline, No JVD  Respiratory: CTA   CV: Normal rate regular rhythm  Abdominal: Soft, incision healing well, tender in RUQ, no erythema or purulent drainage  Extremities: No edema, + peripheral pulses  Skin: No Rashes, Hematoma, Ecchymosis        Labs:                        6.9    6.88  )-----------( 129      ( 26 Oct 2022 06:28 )             22.4     10-26    134<L>  |  95<L>  |  34<H>  ----------------------------<  57<L>  4.8   |  19<L>  |  5.54<H>    Ca    8.7      26 Oct 2022 06:29  Phos  6.0     10-26  Mg     3.0     10-26    TPro  6.8  /  Alb  3.6  /  TBili  1.4<H>  /  DBili  x   /  AST  17  /  ALT  16  /  AlkPhos  73  10-26    LIVER FUNCTIONS - ( 26 Oct 2022 06:29 )  Alb: 3.6 g/dL / Pro: 6.8 g/dL / ALK PHOS: 73 U/L / ALT: 16 U/L / AST: 17 U/L / GGT: x           PT/INR - ( 25 Oct 2022 03:54 )   PT: 14.2 sec;   INR: 1.23 ratio         PTT - ( 25 Oct 2022 03:54 )  PTT:30.1 sec      < from: CT Abdomen and Pelvis w/ IV Cont (10.25.22 @ 05:21) >  FINDINGS:  LOWER CHEST: Central line tip in right atrium. Mild bibasilar   atelectasis. Trace right pleural fluid.    LIVER: Within normal limits.  BILE DUCTS: Pneumobilia, unchanged.  GALLBLADDER: Cholecystectomy. A tubular structure measuring 11 mm in   diameter and containing what appears to be small gallstones is seen in   the gallbladder fossa. This may represent a remnant cystic duct. There is   a trace amount of minimal high density fluid within the gallbladder fossa.  SPLEEN: Splenomegaly.  PANCREAS: Within normal limits.  ADRENALS: Within normal limits.  KIDNEYS/URETERS: Atrophic kidneys.    BLADDER: Within normal limits.  REPRODUCTIVE ORGANS: Uterus and adnexa within normal limits.    BOWEL: No bowel obstruction. Appendix is normal.  PERITONEUM: Small pelvic mildly high density ascites.  VESSELS: Abdominal varices unchanged.  RETROPERITONEUM/LYMPH NODES: No lymphadenopathy.  ABDOMINAL WALL: Tiny fat-containing umbilical hernia.  BONES: Within normal limits.    IMPRESSION:    Cholecystectomy. A tubular structure measuring 11 mm in diameter and   containing what appears to be small gallstones is seen in the gallbladder   fossa. This may represent a remnant cystic duct. There is a trace amount   of minimal high density fluid within the gallbladder fossa.    < end of copied text >

## 2022-10-26 NOTE — PROVIDER CONTACT NOTE (OTHER) - REASON
Patient is ordered for 1 unit PRBC to be administered with HD tonight 10/26, HD being rescheduled for 10/27

## 2022-10-27 ENCOUNTER — TRANSCRIPTION ENCOUNTER (OUTPATIENT)
Age: 62
End: 2022-10-27

## 2022-10-27 VITALS
OXYGEN SATURATION: 95 % | HEART RATE: 89 BPM | DIASTOLIC BLOOD PRESSURE: 75 MMHG | RESPIRATION RATE: 18 BRPM | TEMPERATURE: 98 F | SYSTOLIC BLOOD PRESSURE: 152 MMHG

## 2022-10-27 DIAGNOSIS — K81.9 CHOLECYSTITIS, UNSPECIFIED: ICD-10-CM

## 2022-10-27 LAB
ALBUMIN SERPL ELPH-MCNC: 3.9 G/DL — SIGNIFICANT CHANGE UP (ref 3.3–5)
ALBUMIN SERPL ELPH-MCNC: 4 G/DL
ALP BLD-CCNC: 83 U/L
ALP SERPL-CCNC: 72 U/L — SIGNIFICANT CHANGE UP (ref 40–120)
ALT FLD-CCNC: 15 U/L — SIGNIFICANT CHANGE UP (ref 10–45)
ALT SERPL-CCNC: 23 U/L
ANION GAP SERPL CALC-SCNC: 19 MMOL/L — HIGH (ref 5–17)
ANION GAP SERPL CALC-SCNC: 9 MMOL/L
AST SERPL-CCNC: 17 U/L — SIGNIFICANT CHANGE UP (ref 10–40)
AST SERPL-CCNC: 26 U/L
BASOPHILS # BLD AUTO: 0.09 K/UL — SIGNIFICANT CHANGE UP (ref 0–0.2)
BASOPHILS NFR BLD AUTO: 1.2 % — SIGNIFICANT CHANGE UP (ref 0–2)
BILIRUB SERPL-MCNC: 0.8 MG/DL
BILIRUB SERPL-MCNC: 1 MG/DL — SIGNIFICANT CHANGE UP (ref 0.2–1.2)
BUN SERPL-MCNC: 21 MG/DL
BUN SERPL-MCNC: 41 MG/DL — HIGH (ref 7–23)
CALCIUM SERPL-MCNC: 8.5 MG/DL — SIGNIFICANT CHANGE UP (ref 8.4–10.5)
CALCIUM SERPL-MCNC: 8.9 MG/DL
CHLORIDE SERPL-SCNC: 100 MMOL/L
CHLORIDE SERPL-SCNC: 97 MMOL/L — SIGNIFICANT CHANGE UP (ref 96–108)
CO2 SERPL-SCNC: 19 MMOL/L — LOW (ref 22–31)
CO2 SERPL-SCNC: 30 MMOL/L
CREAT SERPL-MCNC: 4.65 MG/DL
CREAT SERPL-MCNC: 6.96 MG/DL — HIGH (ref 0.5–1.3)
DAT C3-SP REAG RBC QL: NEGATIVE — SIGNIFICANT CHANGE UP
EGFR: 10 ML/MIN/1.73M2
EGFR: 6 ML/MIN/1.73M2 — LOW
EOSINOPHIL # BLD AUTO: 0.19 K/UL — SIGNIFICANT CHANGE UP (ref 0–0.5)
EOSINOPHIL NFR BLD AUTO: 2.6 % — SIGNIFICANT CHANGE UP (ref 0–6)
FERRITIN SERPL-MCNC: 2418 NG/ML
GLUCOSE SERPL-MCNC: 176 MG/DL
GLUCOSE SERPL-MCNC: 91 MG/DL — SIGNIFICANT CHANGE UP (ref 70–99)
HCT VFR BLD CALC: 24.7 % — LOW (ref 34.5–45)
HGB BLD-MCNC: 7.8 G/DL — LOW (ref 11.5–15.5)
IMM GRANULOCYTES NFR BLD AUTO: 5.3 % — HIGH (ref 0–0.9)
IRON SATN MFR SERPL: 47 %
IRON SERPL-MCNC: 96 UG/DL
LYMPHOCYTES # BLD AUTO: 0.65 K/UL — LOW (ref 1–3.3)
LYMPHOCYTES # BLD AUTO: 8.9 % — LOW (ref 13–44)
MAGNESIUM SERPL-MCNC: 2.9 MG/DL — HIGH (ref 1.6–2.6)
MCHC RBC-ENTMCNC: 30.1 PG — SIGNIFICANT CHANGE UP (ref 27–34)
MCHC RBC-ENTMCNC: 31.6 GM/DL — LOW (ref 32–36)
MCV RBC AUTO: 95.4 FL — SIGNIFICANT CHANGE UP (ref 80–100)
MONOCYTES # BLD AUTO: 0.36 K/UL — SIGNIFICANT CHANGE UP (ref 0–0.9)
MONOCYTES NFR BLD AUTO: 4.9 % — SIGNIFICANT CHANGE UP (ref 2–14)
NEUTROPHILS # BLD AUTO: 5.64 K/UL — SIGNIFICANT CHANGE UP (ref 1.8–7.4)
NEUTROPHILS NFR BLD AUTO: 77.1 % — HIGH (ref 43–77)
NRBC # BLD: 1 /100 WBCS — HIGH (ref 0–0)
PHOSPHATE SERPL-MCNC: 5.1 MG/DL — HIGH (ref 2.5–4.5)
PLATELET # BLD AUTO: 124 K/UL — LOW (ref 150–400)
POTASSIUM SERPL-MCNC: 4.5 MMOL/L — SIGNIFICANT CHANGE UP (ref 3.5–5.3)
POTASSIUM SERPL-SCNC: 4.5 MMOL/L — SIGNIFICANT CHANGE UP (ref 3.5–5.3)
POTASSIUM SERPL-SCNC: 5.8 MMOL/L
PROT SERPL-MCNC: 6.9 G/DL
PROT SERPL-MCNC: 7.2 G/DL — SIGNIFICANT CHANGE UP (ref 6–8.3)
RBC # BLD: 2.59 M/UL — LOW (ref 3.8–5.2)
RBC # FLD: 18.8 % — HIGH (ref 10.3–14.5)
SODIUM SERPL-SCNC: 135 MMOL/L — SIGNIFICANT CHANGE UP (ref 135–145)
SODIUM SERPL-SCNC: 140 MMOL/L
TIBC SERPL-MCNC: 203 UG/DL
UIBC SERPL-MCNC: 107 UG/DL
WBC # BLD: 7.32 K/UL — SIGNIFICANT CHANGE UP (ref 3.8–10.5)
WBC # FLD AUTO: 7.32 K/UL — SIGNIFICANT CHANGE UP (ref 3.8–10.5)

## 2022-10-27 PROCEDURE — 86900 BLOOD TYPING SEROLOGIC ABO: CPT

## 2022-10-27 PROCEDURE — 85025 COMPLETE CBC W/AUTO DIFF WBC: CPT

## 2022-10-27 PROCEDURE — 87635 SARS-COV-2 COVID-19 AMP PRB: CPT

## 2022-10-27 PROCEDURE — 86901 BLOOD TYPING SEROLOGIC RH(D): CPT

## 2022-10-27 PROCEDURE — 99285 EMERGENCY DEPT VISIT HI MDM: CPT | Mod: 25

## 2022-10-27 PROCEDURE — 86880 COOMBS TEST DIRECT: CPT

## 2022-10-27 PROCEDURE — 96374 THER/PROPH/DIAG INJ IV PUSH: CPT

## 2022-10-27 PROCEDURE — 82435 ASSAY OF BLOOD CHLORIDE: CPT

## 2022-10-27 PROCEDURE — 85018 HEMOGLOBIN: CPT

## 2022-10-27 PROCEDURE — 71045 X-RAY EXAM CHEST 1 VIEW: CPT

## 2022-10-27 PROCEDURE — 86860 RBC ANTIBODY ELUTION: CPT

## 2022-10-27 PROCEDURE — 74177 CT ABD & PELVIS W/CONTRAST: CPT | Mod: MA

## 2022-10-27 PROCEDURE — 83735 ASSAY OF MAGNESIUM: CPT

## 2022-10-27 PROCEDURE — 87040 BLOOD CULTURE FOR BACTERIA: CPT

## 2022-10-27 PROCEDURE — 83605 ASSAY OF LACTIC ACID: CPT

## 2022-10-27 PROCEDURE — 82803 BLOOD GASES ANY COMBINATION: CPT

## 2022-10-27 PROCEDURE — 99261: CPT

## 2022-10-27 PROCEDURE — 83690 ASSAY OF LIPASE: CPT

## 2022-10-27 PROCEDURE — 85014 HEMATOCRIT: CPT

## 2022-10-27 PROCEDURE — 80053 COMPREHEN METABOLIC PANEL: CPT

## 2022-10-27 PROCEDURE — 86850 RBC ANTIBODY SCREEN: CPT

## 2022-10-27 PROCEDURE — 82330 ASSAY OF CALCIUM: CPT

## 2022-10-27 PROCEDURE — 82947 ASSAY GLUCOSE BLOOD QUANT: CPT

## 2022-10-27 PROCEDURE — 86922 COMPATIBILITY TEST ANTIGLOB: CPT

## 2022-10-27 PROCEDURE — 85610 PROTHROMBIN TIME: CPT

## 2022-10-27 PROCEDURE — 36415 COLL VENOUS BLD VENIPUNCTURE: CPT

## 2022-10-27 PROCEDURE — 84132 ASSAY OF SERUM POTASSIUM: CPT

## 2022-10-27 PROCEDURE — 84295 ASSAY OF SERUM SODIUM: CPT

## 2022-10-27 PROCEDURE — 90935 HEMODIALYSIS ONE EVALUATION: CPT | Mod: GC

## 2022-10-27 PROCEDURE — 36430 TRANSFUSION BLD/BLD COMPNT: CPT

## 2022-10-27 PROCEDURE — 86902 BLOOD TYPE ANTIGEN DONOR EA: CPT

## 2022-10-27 PROCEDURE — P9040: CPT

## 2022-10-27 PROCEDURE — 74183 MRI ABD W/O CNTR FLWD CNTR: CPT

## 2022-10-27 PROCEDURE — 85730 THROMBOPLASTIN TIME PARTIAL: CPT

## 2022-10-27 PROCEDURE — 84100 ASSAY OF PHOSPHORUS: CPT

## 2022-10-27 RX ORDER — ACETAMINOPHEN 500 MG
2 TABLET ORAL
Qty: 240 | Refills: 0
Start: 2022-10-27 | End: 2022-11-25

## 2022-10-27 RX ADMIN — FLUCONAZOLE 200 MILLIGRAM(S): 150 TABLET ORAL at 13:15

## 2022-10-27 RX ADMIN — PANTOPRAZOLE SODIUM 40 MILLIGRAM(S): 20 TABLET, DELAYED RELEASE ORAL at 06:07

## 2022-10-27 RX ADMIN — PIPERACILLIN AND TAZOBACTAM 25 GRAM(S): 4; .5 INJECTION, POWDER, LYOPHILIZED, FOR SOLUTION INTRAVENOUS at 02:05

## 2022-10-27 RX ADMIN — Medication 667 MILLIGRAM(S): at 13:15

## 2022-10-27 RX ADMIN — PIPERACILLIN AND TAZOBACTAM 25 GRAM(S): 4; .5 INJECTION, POWDER, LYOPHILIZED, FOR SOLUTION INTRAVENOUS at 13:17

## 2022-10-27 NOTE — DISCHARGE NOTE NURSING/CASE MANAGEMENT/SOCIAL WORK - PATIENT PORTAL LINK FT
You can access the FollowMyHealth Patient Portal offered by [facility] by registering at the following website: [email or web address]. By joining [employer]’s FollowMyHealth portal, you will also be able to view your health information using other applications (apps) compatible with our system.

## 2022-10-27 NOTE — PROGRESS NOTE ADULT - PROBLEM SELECTOR PLAN 1
Pt. with ESRD on HD three times a week (MWF). Last HD was done on Monday (10/24/22) via [facility]. Pt goes to [facility] dialysis unit. Follows with Dr. De León. Plan for HD today. HD orders placed. Monitor labs and BP. Dose meds as per HD.

## 2022-10-27 NOTE — DISCHARGE NOTE PROVIDER - NSDCCPCAREPLAN_GEN_ALL_CORE_FT
PRINCIPAL DISCHARGE DIAGNOSIS  Diagnosis: Abdominal pain  Assessment and Plan of Treatment:       SECONDARY DISCHARGE DIAGNOSES  Diagnosis: Anemia secondary to renal failure  Assessment and Plan of Treatment:     Diagnosis: ESRD on dialysis  Assessment and Plan of Treatment:     Diagnosis: S/P cholecystectomy  Assessment and Plan of Treatment: - Avoid heavy lifting aything over 5lbs for six weeks following your surgery date. Do NOT drive a car or operate machinery unless cleared by your transplant surgeon during your follow up visit. Avoid straining, use stool softners as needed. Stop stool softners if diarrhea develops.   - Call transplant surgeon office if you develop fever, increased abdominal pain, redness/swelling or bleeding around your incision site  - Bathing: Shower is allowed, you can let soap and water flow over your incision; do NOT rub the area. Bath is NOT allowed until your incision is healed and you have been cleared by your transplant surgeon.

## 2022-10-27 NOTE — DISCHARGE NOTE PROVIDER - HOSPITAL COURSE
62 year old female with history of myelofibrosis, polycythemia vera, ESRD on MWF schedule via LUE AVF (last dialysis W), portal HTN c/b IGV1, subclavian port for transfusions, gastric varices, gallstones, ERCP 9/22/2022 for sludge and stones s/p covered metal biliary stent 10 mm x 4 cm c/b post ERCP pancreatitis who presents with RUQ pain that began 10/22. Reports she has never had pain like this before, and it is new since her CCY. Reports a burning sensation around her incision site and reports when she is riding in a car or moving around, it feels like her incision site is going to burst open. Reports some chills 10/24 but o/w denies any post-prandial pain, N/V/D/C, changes in appetite, fevers. Currently pt reporting RUQ pain, but afebrile, hemodynamically stable, and normal WBC and Tbili/liver enyzmes normal. Had CT A/P showing unchanged pneumobilia, in bile ducts; s/p cholecystectomy; tubular structure measuring 11 mm in diameter and containing what appears to be small gallstones is seen in the gallbladder fossa, may represent a remnant cystic duct; trace amount of minimal high density fluid within the gallbladder fossa. Advanced GI consulted for eval of RUQ pain after biliary stent removal.     Of note was recently admitted earlier this month with RUQ/epigastric pain and fevers. Underwent open cholecystectomy on 10/12/2022 for acute cholecystitis. Had biliary stent removed 10/17 by advanced GI.   Pt presents with 2 days of worsening right upper quadrant abdominal pain, diarrhea, nausea. Patient was admitted to Transplant Surgery service on 10/25/22 for further work up of her presenting symptoms.     CT A/P while in ED revealed tubular structure measure 11mm in diameted, may have small gallstones within gall bladder fossa. She was started on empiric antibiotics (prior cultures growing Klebsiella and Candida spp). with diflucan and Zosyn. Advanced GI was consulted, recommending MRCP. MRCP was completed, revealing small remnant of gallbladder vs cystic duct with small stones. Hemoglobin was noted to be 6.9 from 7.7 upon admission, she was given 1u RBC due to anemia of choric diesaese. Patient underwent HD per nephrology recommendations, tolerated well without adverse reactions. She tolerated a diet without nausea and vomiting.     Decision made between the transplant surgeon and hepatologist that patient was stable for discharge home. She will continue course of PO antibiotics through 11/10/22, and follow up with transplant surgeon within the next 2 weeks for close surgical follow up. She should continue with HD at her outpatient HD center ([facility]).     Antibiotic Plan:   - Augmentin 500mg-125mg- Take 1 tablet on HD days, AFTER HD session (end 11/10/22)   - Diflucan 200mg- Take 1 tablet on HD days, AFTER HD session (end 11/10/22)

## 2022-10-27 NOTE — DISCHARGE NOTE PROVIDER - CARE PROVIDER_API CALL
Dagher, Nabil N (MD)  Surgery  [address] [place] [address]  Phone: [phone]  Fax: [phone]  Follow Up Time: 2 weeks

## 2022-10-27 NOTE — PROGRESS NOTE ADULT - PROBLEM SELECTOR PLAN 2
Patient with anemia in the setting of ESRD. Hemoglobin below target range. Will Continue with INDER (Epo 20,000 units IV TIW with HD). Blood transfusion per primary team. Monitor hemoglobin.

## 2022-10-27 NOTE — PROGRESS NOTE ADULT - ASSESSMENT
62 year old female with history of myelofibrosis, polycythemia vera, ESRD on MWF schedule via LUE AVF (last dialysis W), portal HTN c/b IGV1, subclavian port for transfusions, gastric varices, gallstones, ERCP 9/22/2022 for sludge and stones s/p covered metal biliary stent 10 mm x 4 cm c/b post ERCP pancreatitis who presents with RUQ pain that began 10/22.      RUQ abd pain  Appreciate advance GI consult- MRCP revealing small remnant of gallbladder vs cystic duct with small stones  Pain improving. Tolerating regular diet.   HD today as per nephrology  DC home to complete course PO antibiotics to go thru 11/10/22  FU in clinic in 2 weeks

## 2022-10-27 NOTE — PROGRESS NOTE ADULT - SUBJECTIVE AND OBJECTIVE BOX
Interval Events:   NAEON, afebrile HD stable  Endorsing RUQ abd pain as well as left sided pain, denies n/v/f/c  Reports she wants to go home today  MRI (10/26/22): 1.  Cholecystectomy. Gallbladder remnant versus cystic duct remnant containing small stones is better visualized on CT. 2.  No choledocholithiasis.  3.  Hepatosplenomegaly. Upper abdominal varices, including large gastric   varices.  Per discussion with Dr. Marsh and Dr. Dagher likely pain 2/2 incision --> will defer ERCP for now      Allergies:  No Known Allergies        Hospital Medications:  calcium acetate 667 milliGRAM(s) Oral three times a day with meals  epoetin filiberto-epbx (RETACRIT) Injectable 82891 Unit(s) IV Push <User Schedule>  fluconAZOLE   Tablet 200 milliGRAM(s) Oral daily  pantoprazole    Tablet 40 milliGRAM(s) Oral before breakfast  piperacillin/tazobactam IVPB.. 3.375 Gram(s) IV Intermittent every 12 hours  sodium chloride 0.9%. 1000 milliLiter(s) IV Continuous <Continuous>      PMHX/PSHX:  Polycythemia vera    Peptic ulcer disease    Hypertension    Splenomegaly    Splenic infarct    ESRD on peritoneal dialysis    Cirrhosis of liver without ascites, unspecified hepatic cirrhosis type    Pancreatic cyst    History of myelofibrosis    History of myelofibrosis    No significant past surgical history    Peritoneal dialysis catheter in place    Hemoperitoneum as complication of peritoneal dialysis    AV fistula        Family history:  No pertinent family history in first degree relatives    Family history of hypertension in mother    Family history of malignant neoplasm (Grandparent)    FH: cirrhosis (Sibling)        ROS:     General:  No wt loss, fevers, chills, night sweats, fatigue,   Eyes:  Good vision, no reported pain  ENT:  No sore throat, pain, runny nose, dysphagia  CV:  No pain, palpitations, hypo/hypertension  Pulm:  No dyspnea, cough, tachypnea, wheezing  GI:  see above  :  No pain, bleeding, incontinence, nocturia  Muscle:  No pain, weakness  Neuro:  No weakness, tingling, memory problems  Psych:  No fatigue, insomnia, mood problems, depression  Endocrine:  No polyuria, polydipsia, cold/heat intolerance  Heme:  No petechiae, ecchymosis, easy bruisability  Skin:  No rash, tattoos, scars, edema      PHYSICAL EXAM:   Vital Signs:  Vital Signs Last 24 Hrs  T(C): 36.6 (27 Oct 2022 11:55), Max: 37 (27 Oct 2022 02:00)  T(F): 97.9 (27 Oct 2022 11:55), Max: 98.6 (27 Oct 2022 02:00)  HR: 89 (27 Oct 2022 11:55) (76 - 90)  BP: 149/80 (27 Oct 2022 11:55) (136/76 - 163/63)  BP(mean): --  RR: 18 (27 Oct 2022 11:55) (18 - 18)  SpO2: 98% (27 Oct 2022 11:55) (96% - 98%)    Parameters below as of 27 Oct 2022 11:55  Patient On (Oxygen Delivery Method): room air      Daily     Daily Weight in k (27 Oct 2022 11:55)    GENERAL:  Appears stated age, well-groomed, well-nourished, no distress  HEENT:  NC/AT,  conjunctivae clear and pink  CHEST: no increased effort  ABDOMEN:  Soft, +RUQ-tenderness, non-distended, incision dressed  EXTREMITIES:  no cyanosis,clubbing or edema  SKIN:  No rash/warm/dry  NEURO:  Alert, orientedx3    LABS:                        7.8    7.32  )-----------( 124      ( 27 Oct 2022 06:56 )             24.7     Mean Cell Volume: 95.4 fl (10-27- @ 06:56)    10-27    135  |  97  |  41<H>  ----------------------------<  91  4.5   |  19<L>  |  6.96<H>    Ca    8.5      27 Oct 2022 07:00  Phos  5.1     10-27  Mg     2.9     10-27    TPro  7.2  /  Alb  3.9  /  TBili  1.0  /  DBili  x   /  AST  17  /  ALT  15  /  AlkPhos  72  10-27    LIVER FUNCTIONS - ( 27 Oct 2022 07:00 )  Alb: 3.9 g/dL / Pro: 7.2 g/dL / ALK PHOS: 72 U/L / ALT: 15 U/L / AST: 17 U/L / GGT: x                                       7.8    7.32  )-----------( 124      ( 27 Oct 2022 06:56 )             24.7                         6.9    6.88  )-----------( 129      ( 26 Oct 2022 06:28 )             22.4                         7.7    9.12  )-----------( 129      ( 25 Oct 2022 02:43 )             24.4       Imaging:

## 2022-10-27 NOTE — PROGRESS NOTE ADULT - SUBJECTIVE AND OBJECTIVE BOX
Transplant Surgery Progress Note    HPI: 62 year old female with history of myelofibrosis, polycythemia vera, ESRD on MWF schedule via LUE AVF (last dialysis W), portal HTN c/b IGV1, subclavian port for transfusions, gastric varices, gallstones, ERCP 9/22/2022 for sludge and stones s/p covered metal biliary stent 10 mm x 4 cm c/b post ERCP pancreatitis who presents with RUQ pain that began 10/22. Reports she has never had pain like this before, and it is new since her CCY. Reports a burning sensation around her incision site and reports when she is riding in a car or moving around, it feels like her incision site is going to burst open. Reports some chills 10/24 but o/w denies any post-prandial pain, N/V/D/C, changes in appetite, fevers. Currently pt reporting RUQ pain, but afebrile, hemodynamically stable, and normal WBC and Tbili/liver enyzmes normal. Had CT A/P showing unchanged pneumobilia, in bile ducts; s/p cholecystectomy; tubular structure measuring 11 mm in diameter and containing what appears to be small gallstones is seen in the gallbladder fossa, may represent a remnant cystic duct; trace amount of minimal high density fluid within the gallbladder fossa. [facility] consulted for eval of RUQ pain after biliary stent removal.     Of note was recently admitted earlier this month with RUQ/epigastric pain and fevers. Underwent open cholecystectomy on 10/12/2022 for acute cholecystitis. Had biliary stent removed 10/17 by advanced GI.   Pt presents with 2 days of worsening right upper quadrant abdominal pain, diarrhea, nausea,       Interval Events:   - 1 PRBC yesterday for Hgb 6.9.  Hgb 7.8 today  - MRCP negative  - Diet advanced to regular and tolerated  - Improved RLQ pain        MEDICATIONS  (STANDING):  calcium acetate 667 milliGRAM(s) Oral three times a day with meals  epoetin filiberto-epbx (RETACRIT) Injectable 05244 Unit(s) IV Push <User Schedule>  fluconAZOLE   Tablet 200 milliGRAM(s) Oral daily  pantoprazole    Tablet 40 milliGRAM(s) Oral before breakfast  piperacillin/tazobactam IVPB.. 3.375 Gram(s) IV Intermittent every 12 hours  sodium chloride 0.9%. 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      Pancreatic cyst      History of myelofibrosis      History of myelofibrosis      Peritoneal dialysis catheter in place  Placed 8/9/2017      Hemoperitoneum as complication of peritoneal dialysis  s/p removal 9/18      AV fistula  Placed 9/18          Vital Signs Last 24 Hrs  T(C): 36.6 (27 Oct 2022 11:55), Max: 37 (27 Oct 2022 02:00)  T(F): 97.9 (27 Oct 2022 11:55), Max: 98.6 (27 Oct 2022 02:00)  HR: 89 (27 Oct 2022 11:55) (76 - 90)  BP: 149/80 (27 Oct 2022 11:55) (136/76 - 163/63)  BP(mean): --  RR: 18 (27 Oct 2022 11:55) (18 - 18)  SpO2: 98% (27 Oct 2022 11:55) (96% - 98%)    Parameters below as of 27 Oct 2022 11:55  Patient On (Oxygen Delivery Method): room air        I&O's Summary    26 Oct 2022 07:01  -  27 Oct 2022 07:00  --------------------------------------------------------  IN: 1340 mL / OUT: 0 mL / NET: 1340 mL    27 Oct 2022 07:01  -  27 Oct 2022 14:52  --------------------------------------------------------  IN: 800 mL / OUT: 1800 mL / NET: -1000 mL                              7.8    7.32  )-----------( 124      ( 27 Oct 2022 06:56 )             24.7     10-27    135  |  97  |  41<H>  ----------------------------<  91  4.5   |  19<L>  |  6.96<H>    Ca    8.5      27 Oct 2022 07:00  Phos  5.1     10-27  Mg     2.9     10-27    TPro  7.2  /  Alb  3.9  /  TBili  1.0  /  DBili  x   /  AST  17  /  ALT  15  /  AlkPhos  72  10-27          Culture - Blood (collected 10-25-22 @ 04:31)  Source: .Blood Blood-Peripheral  Preliminary Report (10-26-22 @ 09:01):    No growth to date.    Culture - Blood (collected 10-25-22 @ 02:50)  Source: .Blood Blood-Peripheral  Preliminary Report (10-26-22 @ 09:01):    No growth to date.      Physical Exam:  General: WN/WD NAD  Neurology: A&Ox3, nonfocal, follows commands  Eyes: PERRLA/ EOMI  ENT/Neck: Neck supple, trachea midline, No JVD  Respiratory: CTA   CV: Normal rate regular rhythm  Abdominal: Soft, incision healing well, Less RUQ TTP, no erythema or purulent drainage  Extremities: No edema, + peripheral pulses  Skin: No Rashes, Hematoma, Ecchymosis

## 2022-10-27 NOTE — PROGRESS NOTE ADULT - SUBJECTIVE AND OBJECTIVE BOX
[facility] DIVISION OF KIDNEY DISEASES AND HYPERTENSION -- FOLLOW UP NOTE  --------------------------------------------------------------------------------  Chief Complaint:    24 hour events/subjective:        PAST HISTORY  --------------------------------------------------------------------------------  No significant changes to PMH, PSH, FHx, SHx, unless otherwise noted    ALLERGIES & MEDICATIONS  --------------------------------------------------------------------------------  Allergies    No Known Allergies    Intolerances      Standing Inpatient Medications  calcium acetate 667 milliGRAM(s) Oral three times a day with meals  epoetin filiberto-epbx (RETACRIT) Injectable 89701 Unit(s) IV Push <User Schedule>  fluconAZOLE   Tablet 200 milliGRAM(s) Oral daily  pantoprazole    Tablet 40 milliGRAM(s) Oral before breakfast  piperacillin/tazobactam IVPB.. 3.375 Gram(s) IV Intermittent every 12 hours  sodium chloride 0.9%. 1000 milliLiter(s) IV Continuous <Continuous>    PRN Inpatient Medications      REVIEW OF SYSTEMS  --------------------------------------------------------------------------------  Gen: No fevers/chills  Skin: No rashes  Head/Eyes/Ears: Normal hearing,   Respiratory: No dyspnea, cough  CV: No chest pain  GI: No abdominal pain, diarrhea  : No dysuria, hematuria  MSK: No  edema  Heme: No easy bruising or bleeding  Psych: No significant depression      All other systems were reviewed and are negative, except as noted.    VITALS/PHYSICAL EXAM  --------------------------------------------------------------------------------  T(C): 36.6 (10-27-22 @ 11:55), Max: 37 (10-27-22 @ 02:00)  HR: 89 (10-27-22 @ 11:55) (76 - 90)  BP: 149/80 (10-27-22 @ 11:55) (136/76 - 163/63)  RR: 18 (10-27-22 @ 11:55) (18 - 18)  SpO2: 98% (10-27-22 @ 11:55) (96% - 98%)  Wt(kg): --        10-26-22 @ 07:01  -  10-27-22 @ 07:00  --------------------------------------------------------  IN: 1340 mL / OUT: 0 mL / NET: 1340 mL    10-27-22 @ 07:01  -  10-27-22 @ 12:51  --------------------------------------------------------  IN: 800 mL / OUT: 1800 mL / NET: -1000 mL        Physical Exam:  	Gen: NAD  	HEENT: MMM  	Pulm: CTA B/L  	CV: S1S2  	Abd: Soft, +BS   	Ext: No LE edema B/L  	Neuro: Awake  	Skin: Warm and dry  	Vascular access:      LABS/STUDIES  --------------------------------------------------------------------------------              7.8    7.32  >-----------<  124      [10-27-22 @ 06:56]              24.7     135  |  97  |  41  ----------------------------<  91      [10-27-22 @ 07:00]  4.5   |  19  |  6.96        Ca     8.5     [10-27-22 @ 07:00]      Mg     2.9     [10-27-22 @ 07:00]      Phos  5.1     [10-27-22 @ 07:00]    TPro  7.2  /  Alb  3.9  /  TBili  1.0  /  DBili  x   /  AST  17  /  ALT  15  /  AlkPhos  72  [10-27-22 @ 07:00]    Creatinine Trend:  SCr 6.96 [10-27 @ 07:00]  SCr 5.54 [10-26 @ 06:29]  SCr 3.64 [10-25 @ 02:43]  SCr 5.27 [10-18 @ 07:05]  SCr 3.30 [10-17 @ 13:55]    HbA1c 4.9      [06-01-19 @ 00:46]   [facility] DIVISION OF KIDNEY DISEASES AND HYPERTENSION -- FOLLOW UP NOTE  --------------------------------------------------------------------------------  HPI: Pt is a 62-year-old female with significant Hx of ESRD on HD TIW (MWF), myelofibrosis, polycythemia vera, portal HTN c/b IGV1, subclavian port for transfusions, gastric varices, gallstones, ERCP 9/22/2022 for sludge and stones s/p covered metal biliary stent 10 mm x 4 cm c/b post ERCP pancreatitis who is admitted at [facility] on 10/25/22 RUQ pain for 3 days. Of note, Pt was recently admitted at [facility] (In October 2022) for similar symptoms. Pt being followed for for ESRD/HD management.     Pt. with ESRD on HD three times a week ([facility]). Last HD was done on Monday (10/24/22) via LUE AVF. Pt goes to [facility] dialysis unit. Follows with Dr. De León.     Pt seen and examined at bedside. Reports persistent RLQ pain, although better then yesterday. Denies any fever, SOB, CP, HA, N/V, diarrhea or constipation or dizziness. Plan for HD today.     PAST HISTORY  --------------------------------------------------------------------------------  No significant changes to PMH, PSH, FHx, SHx, unless otherwise noted    ALLERGIES & MEDICATIONS  --------------------------------------------------------------------------------  Allergies    No Known Allergies    Intolerances    Standing Inpatient Medications  calcium acetate 667 milliGRAM(s) Oral three times a day with meals  epoetin filiberto-epbx (RETACRIT) Injectable 61614 Unit(s) IV Push <User Schedule>  fluconAZOLE   Tablet 200 milliGRAM(s) Oral daily  pantoprazole    Tablet 40 milliGRAM(s) Oral before breakfast  piperacillin/tazobactam IVPB.. 3.375 Gram(s) IV Intermittent every 12 hours  sodium chloride 0.9%. 1000 milliLiter(s) IV Continuous <Continuous>    PRN Inpatient Medications    REVIEW OF SYSTEMS  --------------------------------------------------------------------------------  Gen: No fevers   Respiratory: No dyspnea  CV: No chest pain  GI: See HPI  : No dysuria  MSK: No  edema  Neuro: no dizziness   All other systems were reviewed and are negative, except as noted.    VITALS/PHYSICAL EXAM  --------------------------------------------------------------------------------  T(C): 36.6 (10-27-22 @ 11:55), Max: 37 (10-27-22 @ 02:00)  HR: 89 (10-27-22 @ 11:55) (76 - 90)  BP: 149/80 (10-27-22 @ 11:55) (136/76 - 163/63)  RR: 18 (10-27-22 @ 11:55) (18 - 18)  SpO2: 98% (10-27-22 @ 11:55) (96% - 98%)  Wt(kg): --    10-26-22 @ 07:01  -  10-27-22 @ 07:00  --------------------------------------------------------  IN: 1340 mL / OUT: 0 mL / NET: 1340 mL    10-27-22 @ 07:01  -  10-27-22 @ 12:51  --------------------------------------------------------  IN: 800 mL / OUT: 1800 mL / NET: -1000 mL    Physical Exam:  	Gen: NAD, resting comfortably  	HEENT: MMM  	Pulm: CTA B/L  	CV: S1S2  	Abd: Soft, +BS , mild tenderness at RLQ scar site  	Ext: No LE edema B/L  	Neuro: Awake  	Skin: Warm and dry  	Vascular access: LUE AVF +, palpable thrill & bruit +    LABS/STUDIES  --------------------------------------------------------------------------------              7.8    7.32  >-----------<  124      [10-27-22 @ 06:56]              24.7     135  |  97  |  41  ----------------------------<  91      [10-27-22 @ 07:00]  4.5   |  19  |  6.96        Ca     8.5     [10-27-22 @ 07:00]      Mg     2.9     [10-27-22 @ 07:00]      Phos  5.1     [10-27-22 @ 07:00]    TPro  7.2  /  Alb  3.9  /  TBili  1.0  /  DBili  x   /  AST  17  /  ALT  15  /  AlkPhos  72  [10-27-22 @ 07:00]    Creatinine Trend:  SCr 6.96 [10-27 @ 07:00]  SCr 5.54 [10-26 @ 06:29]  SCr 3.64 [10-25 @ 02:43]  SCr 5.27 [10-18 @ 07:05]  SCr 3.30 [10-17 @ 13:55]    HbA1c 4.9      [06-01-19 @ 00:46]

## 2022-10-27 NOTE — PROGRESS NOTE ADULT - ATTENDING COMMENTS
I have seen this patient with the fellow and agree with their assessment and plan. I was physically present for significant portions of the evaluation and management (E/M) service provided.  I agree with the above history, physical, and plan which I have reviewed and edited where appropriate.    Pt with plans for HD today    Yael Aranda MD  Pager   Office     Contact me directly via [employer] Teams     (After 5 pm or on weekends please page the on-call fellow/attending, can check [email or web address] for schedule. Login is db calderon, schedule under [facility] medicine, psych, derm)

## 2022-10-27 NOTE — DISCHARGE NOTE NURSING/CASE MANAGEMENT/SOCIAL WORK - NSDCVIVACCINE_GEN_ALL_CORE_FT
influenza, injectable, quadrivalent, preservative free; 20-Nov-2014 12:44; Aston Ulloa (RN); [employer]; [id number]; IntraMuscular; Deltoid Left.; 0.5 milliLiter(s);   influenza, injectable, quadrivalent, preservative free; 05-Oct-2016 18:15; Rosario James (RN); [employer]; [id number]; IntraMuscular; Deltoid Left.; 0.5 milliLiter(s); VIS (VIS Published: 07-Aug-2015, VIS Presented: 05-Oct-2016);

## 2022-10-27 NOTE — DISCHARGE NOTE NURSING/CASE MANAGEMENT/SOCIAL WORK - NSDCPEFALRISK_GEN_ALL_CORE
For information on Fall & Injury Prevention, visit: [email or web address] OR  [email or web address] OR  https://www.cdc.gov/steadi/patient.html

## 2022-10-27 NOTE — PROGRESS NOTE ADULT - PROBLEM SELECTOR PLAN 3
Pt with hyperphosphatemia in the setting of ESRD. Serum phosphorus above target range. Start phosphate binders (phoslo 667 mg PO TID with meals) for now. Monitor serum phosphorus.    If you have any questions, please feel free to contact me  Talon Mckeon  Nephrology Fellow  [phone]/ [employer] Teams(Preferred)  (After 5pm or on weekends please page the on-call fellow).

## 2022-10-27 NOTE — DISCHARGE NOTE PROVIDER - NSDCFUSCHEDAPPT_GEN_ALL_CORE_FT
[facility]  [facility]  [place]   Scheduled Appointment: 11/03/2022    Wily Wright  [facility] Physician [facility]  ENDOVASCULAR [address]   Scheduled Appointment: 11/15/2022    [facility]  [facility]  Scheduled Appointment: 11/22/2022    [facility]  [facility]  Scheduled Appointment: 12/29/2022    Michela Oliveira  [facility]  [facility] [id number]  Scheduled Appointment: 01/05/2023    Jacky Gou  [facility]  [facility]  Scheduled Appointment: 01/24/2023    [facility]  [facility]  Scheduled Appointment: 01/24/2023

## 2022-10-27 NOTE — DISCHARGE NOTE PROVIDER - NSDCMRMEDTOKEN_GEN_ALL_CORE_FT
acetaminophen 325 mg oral tablet: 2 tab(s) orally every 6 hours, As Needed -for fever - for moderate pain - for mild pain   Augmentin 500 mg-125 mg oral tablet: 500 milligram(s) orally once a day to go thru 11/20/22  Take after dialysis on HD days  carvedilol 3.125 mg oral tablet: 1 tab(s) orally every 12 hours  cyclobenzaprine 5 mg oral tablet: 1 tab(s) orally 3 times a day, As needed, Muscle Spasm  danazol 200 mg oral capsule: 1 cap(s) orally 3 times a day  epoetin filiberto: 46100 unit(s) intravenous Monday, Wednesday, and Friday with dialysis as per nephrology  fluconazole 200 mg oral tablet: 1 tab(s) orally once a day to go thru 11/10/22  Take after dialysis on dialysis days   gabapentin 100 mg oral capsule: 1 cap(s) orally once a day  Jakafi 15 mg oral tablet: 1 tab(s) orally Monday, Wednesday, and Friday after dialysis  Multiple Vitamins oral tablet: 1 tab(s) orally once a day  Nephro-Ben oral tablet: 1 tab(s) orally once a day  polyethylene glycol 3350 oral powder for reconstitution: 17 gram(s) orally every 12 hours  Protonix 40 mg oral granule, delayed release: 1 each orally once a day   senna leaf extract oral tablet: 1 tab(s) orally every 12 hours

## 2022-10-28 ENCOUNTER — APPOINTMENT (OUTPATIENT)
Dept: CT IMAGING | Facility: IMAGING CENTER | Age: 62
End: 2022-10-28

## 2022-10-28 ENCOUNTER — NON-APPOINTMENT (OUTPATIENT)
Age: 62
End: 2022-10-28

## 2022-11-03 ENCOUNTER — APPOINTMENT (OUTPATIENT)
Dept: INTERNAL MEDICINE | Facility: CLINIC | Age: 62
End: 2022-11-03

## 2022-11-03 ENCOUNTER — OUTPATIENT (OUTPATIENT)
Dept: OUTPATIENT SERVICES | Facility: HOSPITAL | Age: 62
LOS: 1 days | End: 2022-11-03
Payer: SELF-PAY

## 2022-11-03 ENCOUNTER — NON-APPOINTMENT (OUTPATIENT)
Age: 62
End: 2022-11-03

## 2022-11-03 VITALS
DIASTOLIC BLOOD PRESSURE: 70 MMHG | HEIGHT: 62 IN | SYSTOLIC BLOOD PRESSURE: 120 MMHG | OXYGEN SATURATION: 98 % | WEIGHT: 120 LBS | HEART RATE: 77 BPM | BODY MASS INDEX: 22.08 KG/M2

## 2022-11-03 DIAGNOSIS — T85.898A OTHER SPECIFIED COMPLICATION OF OTHER INTERNAL PROSTHETIC DEVICES, IMPLANTS AND GRAFTS, INITIAL ENCOUNTER: Chronic | ICD-10-CM

## 2022-11-03 DIAGNOSIS — I77.0 ARTERIOVENOUS FISTULA, ACQUIRED: Chronic | ICD-10-CM

## 2022-11-03 DIAGNOSIS — I10 ESSENTIAL (PRIMARY) HYPERTENSION: ICD-10-CM

## 2022-11-03 DIAGNOSIS — Z99.2 DEPENDENCE ON RENAL DIALYSIS: Chronic | ICD-10-CM

## 2022-11-03 DIAGNOSIS — R10.9 UNSPECIFIED ABDOMINAL PAIN: ICD-10-CM

## 2022-11-03 PROCEDURE — ZZZZZ: CPT

## 2022-11-03 PROCEDURE — G0463: CPT

## 2022-11-03 RX ORDER — HEPATITIS B VACCINE (RECOMBINANT) ADJUVANTED 20 UG/.5ML
20 INJECTION, SOLUTION INTRAMUSCULAR
Qty: 1 | Refills: 0 | Status: COMPLETED | COMMUNITY
Start: 2021-01-26 | End: 2022-11-03

## 2022-11-03 RX ORDER — PROPRANOLOL HYDROCHLORIDE 10 MG/1
10 TABLET ORAL DAILY
Qty: 90 | Refills: 3 | Status: COMPLETED | COMMUNITY
Start: 2021-01-13 | End: 2022-11-03

## 2022-11-03 RX ORDER — CALCIUM ACETATE 667 MG/1
667 CAPSULE ORAL 3 TIMES DAILY
Qty: 270 | Refills: 4 | Status: COMPLETED | COMMUNITY
Start: 2018-07-12 | End: 2022-11-03

## 2022-11-03 RX ORDER — FAMOTIDINE 20 MG/1
20 TABLET, FILM COATED ORAL DAILY
Qty: 90 | Refills: 3 | Status: COMPLETED | COMMUNITY
Start: 2021-11-23 | End: 2022-11-03

## 2022-11-03 NOTE — INTERPRETER SERVICES
[Patient Declined  Services] : - None: Patient declined  services [FreeTextEntry3] : Patient's family member served as  when needed [TWNoteComboBox1] : Portuguese

## 2022-11-03 NOTE — HISTORY OF PRESENT ILLNESS
[Post-hospitalization from ___ Hospital] : Post-hospitalization from [unfilled] Hospital [Admitted on: ___] : The patient was admitted on [unfilled] [Discharged on ___] : discharged on [unfilled] [Discharge Summary] : discharge summary [Discharge Med List] : discharge medication list [FreeTextEntry2] : 62F PMH polycythemia vera w/ splenic vein thrombosis w/ subsequent portal HTN & gastric varices now c/b transfusion-dependent myelofibrosis s/p subclavian port placement, acute cholecystitis s/p ERCP c/b pancreatitis & subsequent cholecystectomy who presented to [facility] 10/25 with continued RUQ abdominal pain. Underwent MRCP that revealed dilated cystic duct. Pain 2/2 retained stone vs. bile leak vs. early postcholecystectomy syndrome. Started on Augmentin & Diflucan through 11/10. ERCP deferred while inpatient. \par \par Since discharge, she has had resolution in her abdominal pain though she has continued superficial pain at the surgical site. She has had no issues obtaining her medications and is taking them as per below. She has some residual weakness but is able to ambulate and complete hemodialysis sessions without issue. She does endorse some intermittent headaches associated with SBP readings of 160s. She was recently changed from propranolol to carvedilol. She does have a history of hypotension/syncope with dialysis in the past.\par \par She also reports a 1 time episode of "poking" chest pain while lying flat this morning which resolved when she stood up and ambulated. It radiated to her back and lasted about 5 minutes. No associated nausea, arm pain, diaphoresis. Has not recurred since this morning. \par \par She endorses a significant amount of stress surrounding her multiple diagnoses and hospitalizations. She has good support at home in the form of her daughter and .\par \par She has follow up scheduled with the vascular surgeons, hematology, and GI. \par \par Discharge meds:\par Tylenol as needed\par Augmentin 500-125mg on HD days after HD through 11/10\par Carvedilol 3.125mg bid\par Danazol 200mg tid\par Fluconazole 200mg on HD days after HD through 11/10\par Gabapentin 100mg qhs\par Jakafi 15mg 3x/week\par Nephro-deonna\par Protonix 40mg qd

## 2022-11-03 NOTE — PHYSICAL EXAM
[Normal Rate] : normal rate  [Regular Rhythm] : with a regular rhythm [Normal S1, S2] : normal S1 and S2 [Normal] : affect was normal and insight and judgment were intact [de-identified] : Systolic murmur noted [de-identified] : Well healing surgical scar noted, no redness, tenderness/swelling [28846 - Moderate Complexity requires multiple possible diagnoses and/or the management options, moderate complexity of the medical data (tests, etc.) to be reviewed, and moderate risk of significant complications, morbidity, and/or mortality as well as co] : Moderate Complexity

## 2022-11-03 NOTE — ASSESSMENT
[FreeTextEntry1] : 62F PMH polycythemia vera w/ splenic vein thrombosis w/ subsequent portal HTN & gastric varices now c/b transfusion-dependent myelofibrosis s/p subclavian port placement, acute cholecystitis s/p ERCP c/b pancreatitis & subsequent cholecystectomy who presented to [facility] 10/25 with continued RUQ abdominal pain w/ MRCP that revealed dilated cystic duct. Pain 2/2 retained stone vs. bile leak vs. early postcholecystectomy syndrome presenting for hospital follow up.\par \par #RUQ pain s/p cholecystectomy\par - Pain improving\par - Tylenol prn\par - GI f/u scheduled, will attempt to acquire earlier appointment\par - Continue Augmentin & Diflucan through 11/10\par \par #Chest pain\par - Low suspicion for cardiac pain\par - EKG in office without ischemic findings\par - Continue pantoprazole\par - Informed of warning signs for ER/clinic visit\par \par #Myelofibrosis\par - Hematology f/u prn\par - Has blood transfusion scheduled for 11/22\par - Will call HD center for lab results from 10/31\par \par #ESRD\par - Continue HD MWF, tolerating well\par - Vascular surgery f/u 11/15\par \par #HTN\par - Continue carvedilol for now\par - Informed on proper BP measurement technique\par - Blood pressure log\par - RTC 10 week blood pressure check\par \par #Stress\par - Met with behavioral health extern Henrik Browne today\par - Reassessment in 10 weeks\par \par #HCM\par - s/p flu vaccine, COVID booster\par - Will discuss further screenings at further visit\par - No labs today\par - RTC 10 weeks or prn\par \par Discussed with Dr. Santiago\par \par Michael Calderón MD\par PGY-2, [facility]

## 2022-11-03 NOTE — REVIEW OF SYSTEMS
[Fatigue] : fatigue [Chest Pain] : chest pain [Heartburn] : heartburn [Fever] : no fever [Chills] : no chills [Palpitations] : no palpitations [Leg Claudication] : no leg claudication [Lower Ext Edema] : no lower extremity edema [Orthopnea] : no orthopnea [Paroxysmal Nocturnal Dyspnea] : no paroxysmal nocturnal dyspnea [Shortness Of Breath] : no shortness of breath [Wheezing] : no wheezing [Cough] : no cough [Dyspnea on Exertion] : no dyspnea on exertion [Abdominal Pain] : no abdominal pain [Nausea] : no nausea [Constipation] : no constipation [Diarrhea] : diarrhea [Vomiting] : no vomiting [Melena] : no melena [Dysuria] : no dysuria [Incontinence] : no incontinence [Hematuria] : no hematuria [Headache] : no headache [Dizziness] : no dizziness [Fainting] : no fainting [Suicidal] : not suicidal [Insomnia] : no insomnia [Anxiety] : no anxiety [Depression] : no depression

## 2022-11-08 ENCOUNTER — EMERGENCY (EMERGENCY)
Facility: HOSPITAL | Age: 62
LOS: 1 days | Discharge: HOME | End: 2022-11-08
Attending: STUDENT IN AN ORGANIZED HEALTH CARE EDUCATION/TRAINING PROGRAM
Payer: MEDICAID

## 2022-11-08 VITALS
DIASTOLIC BLOOD PRESSURE: 80 MMHG | SYSTOLIC BLOOD PRESSURE: 120 MMHG | TEMPERATURE: 98 F | HEART RATE: 77 BPM | RESPIRATION RATE: 18 BRPM | WEIGHT: 125 LBS | OXYGEN SATURATION: 98 % | HEIGHT: 62 IN

## 2022-11-08 DIAGNOSIS — Z99.2 DEPENDENCE ON RENAL DIALYSIS: Chronic | ICD-10-CM

## 2022-11-08 DIAGNOSIS — I77.0 ARTERIOVENOUS FISTULA, ACQUIRED: Chronic | ICD-10-CM

## 2022-11-08 DIAGNOSIS — R10.9 UNSPECIFIED ABDOMINAL PAIN: ICD-10-CM

## 2022-11-08 DIAGNOSIS — T85.898A OTHER SPECIFIED COMPLICATION OF OTHER INTERNAL PROSTHETIC DEVICES, IMPLANTS AND GRAFTS, INITIAL ENCOUNTER: Chronic | ICD-10-CM

## 2022-11-08 DIAGNOSIS — D75.81 MYELOFIBROSIS: ICD-10-CM

## 2022-11-08 DIAGNOSIS — N18.6 END STAGE RENAL DISEASE: ICD-10-CM

## 2022-11-08 LAB
ALBUMIN SERPL ELPH-MCNC: 4.3 G/DL — SIGNIFICANT CHANGE UP (ref 3.3–5)
ALP SERPL-CCNC: 108 U/L — SIGNIFICANT CHANGE UP (ref 40–120)
ALT FLD-CCNC: 19 U/L — SIGNIFICANT CHANGE UP (ref 10–45)
ANION GAP SERPL CALC-SCNC: 14 MMOL/L — SIGNIFICANT CHANGE UP (ref 5–17)
AST SERPL-CCNC: 18 U/L — SIGNIFICANT CHANGE UP (ref 10–40)
BASOPHILS # BLD AUTO: 0.04 K/UL — SIGNIFICANT CHANGE UP (ref 0–0.2)
BASOPHILS NFR BLD AUTO: 0.9 % — SIGNIFICANT CHANGE UP (ref 0–2)
BILIRUB SERPL-MCNC: 1 MG/DL — SIGNIFICANT CHANGE UP (ref 0.2–1.2)
BLD GP AB SCN SERPL QL: NEGATIVE — SIGNIFICANT CHANGE UP
BUN SERPL-MCNC: 26 MG/DL — HIGH (ref 7–23)
CALCIUM SERPL-MCNC: 8.7 MG/DL — SIGNIFICANT CHANGE UP (ref 8.4–10.5)
CHLORIDE SERPL-SCNC: 97 MMOL/L — SIGNIFICANT CHANGE UP (ref 96–108)
CO2 SERPL-SCNC: 26 MMOL/L — SIGNIFICANT CHANGE UP (ref 22–31)
CREAT SERPL-MCNC: 5.08 MG/DL — HIGH (ref 0.5–1.3)
EGFR: 9 ML/MIN/1.73M2 — LOW
EOSINOPHIL # BLD AUTO: 0.12 K/UL — SIGNIFICANT CHANGE UP (ref 0–0.5)
EOSINOPHIL NFR BLD AUTO: 2.7 % — SIGNIFICANT CHANGE UP (ref 0–6)
GAS PNL BLDV: SIGNIFICANT CHANGE UP
GLUCOSE SERPL-MCNC: 162 MG/DL — HIGH (ref 70–99)
HCT VFR BLD CALC: 23.6 % — LOW (ref 34.5–45)
HGB BLD-MCNC: 7.4 G/DL — LOW (ref 11.5–15.5)
LIDOCAIN IGE QN: 193 U/L — HIGH (ref 7–60)
LYMPHOCYTES # BLD AUTO: 1.44 K/UL — SIGNIFICANT CHANGE UP (ref 1–3.3)
LYMPHOCYTES # BLD AUTO: 32.4 % — SIGNIFICANT CHANGE UP (ref 13–44)
MCHC RBC-ENTMCNC: 30 PG — SIGNIFICANT CHANGE UP (ref 27–34)
MCHC RBC-ENTMCNC: 31.4 GM/DL — LOW (ref 32–36)
MCV RBC AUTO: 95.5 FL — SIGNIFICANT CHANGE UP (ref 80–100)
MONOCYTES # BLD AUTO: 0.12 K/UL — SIGNIFICANT CHANGE UP (ref 0–0.9)
MONOCYTES NFR BLD AUTO: 2.7 % — SIGNIFICANT CHANGE UP (ref 2–14)
NEUTROPHILS # BLD AUTO: 2.4 K/UL — SIGNIFICANT CHANGE UP (ref 1.8–7.4)
NEUTROPHILS NFR BLD AUTO: 53.2 % — SIGNIFICANT CHANGE UP (ref 43–77)
PLATELET # BLD AUTO: 165 K/UL — SIGNIFICANT CHANGE UP (ref 150–400)
POTASSIUM SERPL-MCNC: 4.9 MMOL/L — SIGNIFICANT CHANGE UP (ref 3.5–5.3)
POTASSIUM SERPL-SCNC: 4.9 MMOL/L — SIGNIFICANT CHANGE UP (ref 3.5–5.3)
PROT SERPL-MCNC: 7.4 G/DL — SIGNIFICANT CHANGE UP (ref 6–8.3)
RBC # BLD: 2.47 M/UL — LOW (ref 3.8–5.2)
RBC # FLD: 20.3 % — HIGH (ref 10.3–14.5)
RH IG SCN BLD-IMP: POSITIVE — SIGNIFICANT CHANGE UP
SODIUM SERPL-SCNC: 137 MMOL/L — SIGNIFICANT CHANGE UP (ref 135–145)
TROPONIN T, HIGH SENSITIVITY RESULT: 29 NG/L — SIGNIFICANT CHANGE UP (ref 0–51)
WBC # BLD: 4.44 K/UL — SIGNIFICANT CHANGE UP (ref 3.8–10.5)
WBC # FLD AUTO: 4.44 K/UL — SIGNIFICANT CHANGE UP (ref 3.8–10.5)

## 2022-11-08 PROCEDURE — 71045 X-RAY EXAM CHEST 1 VIEW: CPT | Mod: 26

## 2022-11-08 PROCEDURE — 99285 EMERGENCY DEPT VISIT HI MDM: CPT

## 2022-11-08 NOTE — ED PROVIDER NOTE - PROGRESS NOTE DETAILS
DO Gerardo: patient re-assessed and resting in no acute distress after her transfusion.  patient and patient's daughter state they have a follow-up appointment with their primary doctor and GI doctor scheduled for within 7 days.  all questions answered and patient reports resolution of all symptoms at this time.  ambulatory with steady gait.

## 2022-11-08 NOTE — ED ADULT TRIAGE NOTE - CHIEF COMPLAINT QUOTE
Low hemoglobin 6.6. Pt has had fever x3 days, gallbladder removal x3 weeks ago. Pt had dialysis yesterday and today was told hemoglobin 6.6 today. Pt endorses SOB

## 2022-11-08 NOTE — ED PROVIDER NOTE - PATIENT PORTAL LINK FT
You can access the FollowMyHealth Patient Portal offered by [facility] by registering at the following website: [email or web address]. By joining [employer]’s FollowMyHealth portal, you will also be able to view your health information using other applications (apps) compatible with our system.

## 2022-11-08 NOTE — ED PROVIDER NOTE - NSFOLLOWUPINSTRUCTIONS_ED_ALL_ED_FT
There were no signs of an emergency medical condition on completion of today's workup.  You will need further medical care and evaluation. A presumptive diagnosis has been made, however further evaluation may be required by your primary care doctor or specialist for a definitive diagnosis.      Follow up with your medical doctor in 2-3 days or call our clinic at [phone] and state you were seen in the Emergency Department and would like to be seen in clinic. You may also call [phone] to speak with a representative to assist follow up care with medicine, surgery, or specialists.    If you are having pain, take Tylenol/acetaminophen 1 g every six hours and supplement (if allowed by your physician, and if you're not having gastric/gastrointestinal/stomach/intestinal problems) with ibuprofen 600 mg, with food or milk/maalox, every six hours which can be taken three hours apart from the Tylenol to have a layered effect.     Be sure to take no more than 4000mg or 4g of Tylenol/acetaminophen in a 24 hour period. Be sure to check your other medications to see if they include Tylenol/acetaminophen and include them in your calculations to ensure you do not take more than 4000mg or 4g of Tylenol/acetaminophen a day.    Drink at least 2 Liters or 64 Ounces of water each day (UNLESS you are supposed to restrict fluids or have a history of congestive heart failure (CHF)).    Return for any persistent, worsening symptoms, or ANY concerns at all.    Please follow-up at your scheduled outpatient appointments.

## 2022-11-08 NOTE — ED PROVIDER NOTE - CLINICAL SUMMARY MEDICAL DECISION MAKING FREE TEXT BOX
Carmine Gipson MD: 62-year-old female with past history of ESRD on HD on MWF cirrhosis with ascites, polycythemia vera, recent cholecystectomy status post biliary stent placement and removal presents for anemia from outside labs were told she had a hemoglobin of 6.6 yesterday.  Patient does endorse some shortness of breath is worse with exertion denies any chest pain states that she occasionally feels dizzy with exerting herself.  Patient's exam largely unremarkable endorses improvement of abdominal pain that she has been experiencing since cholecystectomy has been having chronically elevated lipases, no concern for acute pancreatitis though lipase is elevated given no abdominal pain and skin looks cleaned and non-infected, orthostatics were negative patient ordered for basic labs that showed a hemoglobin of 7.4 and will transfuse, troponin of 29 and will repeat we will obtain an EKG and chest x-ray.  Patient denies any GI bleed and ordered for dose of IV pantoprazole given her history of gastric varices.  Patient clinically looks well after transfusion if symptoms have improved likely discharge. 63 yo F w/ ESRD on HD (MWF, last on 11/7), myelofibrosis w/ multiple blood transfusions (last 10/26/22), polycythemia vera, and hx of gastric varices presents for low Hgb of 6.6 in the setting of 2-3 days of SOB w/ rest , dizziness, and fever of 100.3. Physical exam is remarkable for 5-6 cm RUQ surgical incision site w/ clotted blood at lateral aspect of incision w/ tenderness at site. Concern for chronic anemia secondary to myelofibrosis. Low concern for GI bleed or anemia due to bleed from incision site. Plan for labs, type+screen, and transfusion due to symptomatic anemia.

## 2022-11-08 NOTE — ED PROVIDER NOTE - RAPID ASSESSMENT
62 F with PMHx of fistula, HTN, myelofibrosis, splenomegaly, and cholecystitis presents to the ED s/p low hemoglobin (6.6). As per daughter, pt endorsed a fever for 2 days, SOB, and dizziness. Reports had dialysis yesterday (MWF). Denies bleeding.     **Pt seen in the waiting room via teletriage by Anne Henson), documentation completed by Markos Doe. Pt to be sent to main ED for further evaluation - all orders placed to be followed by MD in the main ED** 62 F with PMHx of fistula, HTN, myelofibrosis, splenomegaly, and cholecystitis presents to the ED s/p low hemoglobin (6.6). As per daughter, pt endorsed a fever for 2 days, SOB, and dizziness. Reports had dialysis yesterday (MWF). Denies bleeding. pt sitting in chair, nonlabored respirations.     **Pt seen in the waiting room via teletriage by Anne Henson), documentation completed by Markos Doe. Pt to be sent to main ED for further evaluation - all orders placed to be followed by MD in the main ED**    ap- This idania's documentation has been prepared under my direction and personally reviewed by me. Pt to be sent to main ED for further evaluation - all orders placed to be followed by attending physician in main ED.

## 2022-11-08 NOTE — ED ADULT NURSE NOTE - OBJECTIVE STATEMENT
62 year old female coming in for an abnormal lab results. PT has a PMH of polycythemia, pt received HD through Left AVF on M,W,F, gallbladder removal x3 weeks ago. During dialysis they draw her blood and it showed a low hemoglobin and she was told to come to the ER. Pt has received transfusions for anemia in the past. Pt also notes she had a fever on Saturday but denies any other viral symptoms. No cough or runny nose. Patient endorses feeling short of breath that began yesterday. Breathing is clear and unlabored on arrival. No chest pain, pressure or palpitations. No abdominal pain, nausea or vomiting. No blood in stool.

## 2022-11-08 NOTE — ED PROVIDER NOTE - PHYSICAL EXAMINATION
GENERAL: no acute distress, mesomorphic body habitus  HEENT: atraumatic, normocephalic, vision grossly intact,  EOMI, no conjunctivitis or discharge, hearing grossly intact, clear auditory canal, no nasal discharge or epistaxis, clear pharynx,  CV: regular rate, normal rhythm, normal S1/S2, no murmurs/rubs, no cyanosis, 2+ peripheral pulses in b/l U/L extremities, cap refill < 2 seconds  PULM: normal work of breathing, clear breath sounds in b/l upper/lower lung fields, no crackles/rales/rhonchi/wheezing  GI:  5-6 cm RUQ surgical incision site w/ fresh clotted blood at lateral aspect of incision w/ tenderness around incision site, soft/non-tender/nondistended abdomen, no guarding or rebound tenderness, no palpable masses  NEURO: A&Ox4, follows commands, normal speech, no focal motor or sensory deficits  MSK: no joint swelling or erythema, ranging all extremities with no appreciable loss of ROM  EXT: no peripheral edema, calf tenderness, redness or swelling  SKIN: warm, dry, and intact, no rashes  PSYCH: appropriate mood and affect

## 2022-11-08 NOTE — ED PROVIDER NOTE - NS ED ROS FT
GENERAL: No chills  EYES: No change in vision  HEENT: No trouble swallowing or speaking  CARDIAC: No chest pain  PULMONARY: No cough  GI: No abdominal pain, no diarrhea or constipation  : anuric  SKIN: No rashes  NEURO: No headache, no numbness  MSK: No joint pain  Otherwise as HPI or negative.

## 2022-11-08 NOTE — ED PROVIDER NOTE - OBJECTIVE STATEMENT
61 yo F w/ ESRD on HD (MWF, last on 11/7), myelofibrosis w/ multiple blood transfusions (last 10/26/22) 63 yo F w/ ESRD on HD (MWF, last on 11/7), myelofibrosis w/ multiple blood transfusions (last 10/26/22), polycythemia vera, and hx of gastric varices presents for low Hgb of 6.6 in the setting of 2-3 days of SOB w/ rest , dizziness, and fever of 100.3. She has a history of repeated blood transfusions via a R chest port every few months - baseline Hgb at 8-9. Her most recent labs were performed at her dialysis center. She also endorses nausea/vomiting and a recent history of cholecystectomy (3 weeks prior) c/b w/ persistent bleeding/pus from the incision site.

## 2022-11-09 VITALS
DIASTOLIC BLOOD PRESSURE: 71 MMHG | RESPIRATION RATE: 16 BRPM | HEART RATE: 80 BPM | TEMPERATURE: 98 F | SYSTOLIC BLOOD PRESSURE: 137 MMHG | OXYGEN SATURATION: 99 %

## 2022-11-09 LAB
APTT BLD: 30.4 SEC — SIGNIFICANT CHANGE UP (ref 27.5–35.5)
CULTURE RESULTS: SIGNIFICANT CHANGE UP
HCT VFR BLD CALC: 24.8 % — LOW (ref 34.5–45)
HGB BLD-MCNC: 7.8 G/DL — LOW (ref 11.5–15.5)
INR BLD: 1.09 RATIO — SIGNIFICANT CHANGE UP (ref 0.88–1.16)
MCHC RBC-ENTMCNC: 30.1 PG — SIGNIFICANT CHANGE UP (ref 27–34)
MCHC RBC-ENTMCNC: 31.5 GM/DL — LOW (ref 32–36)
MCV RBC AUTO: 95.8 FL — SIGNIFICANT CHANGE UP (ref 80–100)
NRBC # BLD: 2 /100 WBCS — HIGH (ref 0–0)
PLATELET # BLD AUTO: 106 K/UL — LOW (ref 150–400)
PROTHROM AB SERPL-ACNC: 12.6 SEC — SIGNIFICANT CHANGE UP (ref 10.5–13.4)
RBC # BLD: 2.59 M/UL — LOW (ref 3.8–5.2)
RBC # FLD: 19.4 % — HIGH (ref 10.3–14.5)
SARS-COV-2 RNA SPEC QL NAA+PROBE: SIGNIFICANT CHANGE UP
SPECIMEN SOURCE: SIGNIFICANT CHANGE UP
TROPONIN T, HIGH SENSITIVITY RESULT: 28 NG/L — SIGNIFICANT CHANGE UP (ref 0–51)
UFH PPP CHRO-ACNC: 0.08 IU/ML — LOW (ref 0.3–0.7)
WBC # BLD: 3.4 K/UL — LOW (ref 3.8–10.5)
WBC # FLD AUTO: 3.4 K/UL — LOW (ref 3.8–10.5)

## 2022-11-09 PROCEDURE — 85520 HEPARIN ASSAY: CPT

## 2022-11-09 PROCEDURE — 86860 RBC ANTIBODY ELUTION: CPT

## 2022-11-09 PROCEDURE — 80053 COMPREHEN METABOLIC PANEL: CPT

## 2022-11-09 PROCEDURE — 86077 PHYS BLOOD BANK SERV XMATCH: CPT

## 2022-11-09 PROCEDURE — 82803 BLOOD GASES ANY COMBINATION: CPT

## 2022-11-09 PROCEDURE — 86922 COMPATIBILITY TEST ANTIGLOB: CPT

## 2022-11-09 PROCEDURE — 93005 ELECTROCARDIOGRAM TRACING: CPT

## 2022-11-09 PROCEDURE — 99285 EMERGENCY DEPT VISIT HI MDM: CPT | Mod: 25

## 2022-11-09 PROCEDURE — U0005: CPT

## 2022-11-09 PROCEDURE — 82435 ASSAY OF BLOOD CHLORIDE: CPT

## 2022-11-09 PROCEDURE — 83690 ASSAY OF LIPASE: CPT

## 2022-11-09 PROCEDURE — 36430 TRANSFUSION BLD/BLD COMPNT: CPT

## 2022-11-09 PROCEDURE — 85018 HEMOGLOBIN: CPT

## 2022-11-09 PROCEDURE — 86880 COOMBS TEST DIRECT: CPT

## 2022-11-09 PROCEDURE — 85730 THROMBOPLASTIN TIME PARTIAL: CPT

## 2022-11-09 PROCEDURE — U0003: CPT

## 2022-11-09 PROCEDURE — 86850 RBC ANTIBODY SCREEN: CPT

## 2022-11-09 PROCEDURE — 86902 BLOOD TYPE ANTIGEN DONOR EA: CPT

## 2022-11-09 PROCEDURE — 84484 ASSAY OF TROPONIN QUANT: CPT

## 2022-11-09 PROCEDURE — P9040: CPT

## 2022-11-09 PROCEDURE — 85610 PROTHROMBIN TIME: CPT

## 2022-11-09 PROCEDURE — 71045 X-RAY EXAM CHEST 1 VIEW: CPT

## 2022-11-09 PROCEDURE — 84295 ASSAY OF SERUM SODIUM: CPT

## 2022-11-09 PROCEDURE — 82330 ASSAY OF CALCIUM: CPT

## 2022-11-09 PROCEDURE — 85025 COMPLETE CBC W/AUTO DIFF WBC: CPT

## 2022-11-09 PROCEDURE — 86901 BLOOD TYPING SEROLOGIC RH(D): CPT

## 2022-11-09 PROCEDURE — 85014 HEMATOCRIT: CPT

## 2022-11-09 PROCEDURE — 83605 ASSAY OF LACTIC ACID: CPT

## 2022-11-09 PROCEDURE — 82947 ASSAY GLUCOSE BLOOD QUANT: CPT

## 2022-11-09 PROCEDURE — 86900 BLOOD TYPING SEROLOGIC ABO: CPT

## 2022-11-09 PROCEDURE — 84132 ASSAY OF SERUM POTASSIUM: CPT

## 2022-11-09 RX ORDER — PANTOPRAZOLE SODIUM 20 MG/1
40 TABLET, DELAYED RELEASE ORAL ONCE
Refills: 0 | Status: COMPLETED | OUTPATIENT
Start: 2022-11-09 | End: 2022-11-09

## 2022-11-09 RX ADMIN — PANTOPRAZOLE SODIUM 40 MILLIGRAM(S): 20 TABLET, DELAYED RELEASE ORAL at 03:11

## 2022-11-09 NOTE — ED ADULT NURSE REASSESSMENT NOTE - NS ED NURSE REASSESS COMMENT FT1
Pt resting comfortably in bed, denies any adverse transfusion reactions at this time. No signs of transfusion reaction noted.

## 2022-11-09 NOTE — ED ADULT NURSE REASSESSMENT NOTE - NS ED NURSE REASSESS COMMENT FT1
Blood bank contacted for information on when PRBC will be available. Blood bank stated we are still waiting for blood to be transferred to [facility].

## 2022-11-09 NOTE — ED ADULT NURSE REASSESSMENT NOTE - NS ED NURSE REASSESS COMMENT FT1
Blood transfusion of 1 unit PRBCs started w/ 2 RNs at bedside positively identified pt. Consent in chart. Pt educated on indication for transfusion and adverse transfusion reactions. Pt verbalizes understanding of indication and possible adverse reactions. Call bell in reach and pt understands to alert staff if symptoms arise. Pt currently resting comfortably in bed.

## 2022-11-09 NOTE — ED ADULT NURSE REASSESSMENT NOTE - NS ED NURSE REASSESS COMMENT FT1
Placement of port confirmed via chest x-ray. Okay to access as per MD orders. Port accessed using sterile technique. 20gauge needle in place. +blood return. Sterile dressing applied to site. Patient tolerated well. Specimens collected and sent to lab. Safety and comfort of patient maintained.

## 2022-11-09 NOTE — ED ADULT NURSE REASSESSMENT NOTE - NS ED NURSE REASSESS COMMENT FT1
Called blood bank to ask if PRBCs are at [facility], blood bank stated they are here and will be ready in 5-10 minutes.

## 2022-11-14 ENCOUNTER — LABORATORY RESULT (OUTPATIENT)
Age: 62
End: 2022-11-14

## 2022-11-15 ENCOUNTER — RESULT REVIEW (OUTPATIENT)
Age: 62
End: 2022-11-15

## 2022-11-15 ENCOUNTER — APPOINTMENT (OUTPATIENT)
Dept: ENDOVASCULAR SURGERY | Facility: CLINIC | Age: 62
End: 2022-11-15

## 2022-11-15 VITALS
WEIGHT: 121.25 LBS | TEMPERATURE: 98 F | HEART RATE: 69 BPM | RESPIRATION RATE: 20 BRPM | DIASTOLIC BLOOD PRESSURE: 79 MMHG | HEIGHT: 62 IN | SYSTOLIC BLOOD PRESSURE: 148 MMHG | BODY MASS INDEX: 22.31 KG/M2 | OXYGEN SATURATION: 99 %

## 2022-11-15 PROCEDURE — 36907Z: CUSTOM | Mod: 59

## 2022-11-15 PROCEDURE — 36902Z: CUSTOM

## 2022-11-15 NOTE — PAST MEDICAL HISTORY
[FreeTextEntry1] : Malignant Hyperthermia Screening Tool and Risk of Bleeding Assessment\par Ms. MIHYEON HU denies family history of unexpected death following Anesthesia or Exercise.\par Denies Family history of Malignant Hyperthermia, Muscle or Neuromuscular disorder and High Temperature following exercise.\par \par Ms. MIHYEON HU denies history of Muscle Spasm, Dark or Chocolate - Colored urine and Unanticipated fever immediately following anesthesia or serious exercise. \par Ms. BECKMAN also denies bleeding tendencies/ Risks of Bleeding.

## 2022-11-15 NOTE — HISTORY OF PRESENT ILLNESS
[FreeTextEntry1] : Dr. Dempsey 9/18/17 [FreeTextEntry4] : Yesterday  [FreeTextEntry5] : Yesterday at 9:30pm [FreeTextEntry6] : Dr. Koroma

## 2022-11-15 NOTE — ASSESSMENT
[FreeTextEntry1] : known central stenosis with prolonged bleeding-plan for fistulogram and possible intervention

## 2022-11-22 ENCOUNTER — APPOINTMENT (OUTPATIENT)
Dept: INFUSION THERAPY | Facility: HOSPITAL | Age: 62
End: 2022-11-22

## 2022-11-30 ENCOUNTER — APPOINTMENT (OUTPATIENT)
Dept: NEPHROLOGY | Facility: CLINIC | Age: 62
End: 2022-11-30

## 2022-12-06 ENCOUNTER — NON-APPOINTMENT (OUTPATIENT)
Age: 62
End: 2022-12-06

## 2022-12-08 ENCOUNTER — RESULT REVIEW (OUTPATIENT)
Age: 62
End: 2022-12-08

## 2022-12-08 ENCOUNTER — APPOINTMENT (OUTPATIENT)
Dept: INFUSION THERAPY | Facility: HOSPITAL | Age: 62
End: 2022-12-08

## 2022-12-08 ENCOUNTER — OUTPATIENT (OUTPATIENT)
Dept: OUTPATIENT SERVICES | Facility: HOSPITAL | Age: 62
LOS: 1 days | End: 2022-12-08
Payer: MEDICAID

## 2022-12-08 DIAGNOSIS — T85.898A OTHER SPECIFIED COMPLICATION OF OTHER INTERNAL PROSTHETIC DEVICES, IMPLANTS AND GRAFTS, INITIAL ENCOUNTER: Chronic | ICD-10-CM

## 2022-12-08 DIAGNOSIS — D50.9 IRON DEFICIENCY ANEMIA, UNSPECIFIED: ICD-10-CM

## 2022-12-08 DIAGNOSIS — Z99.2 DEPENDENCE ON RENAL DIALYSIS: Chronic | ICD-10-CM

## 2022-12-08 DIAGNOSIS — I77.0 ARTERIOVENOUS FISTULA, ACQUIRED: Chronic | ICD-10-CM

## 2022-12-08 LAB
ALBUMIN SERPL ELPH-MCNC: 4.5 G/DL — SIGNIFICANT CHANGE UP (ref 3.3–5)
ALP SERPL-CCNC: 106 U/L — SIGNIFICANT CHANGE UP (ref 40–120)
ALT FLD-CCNC: 17 U/L — SIGNIFICANT CHANGE UP (ref 10–45)
ANION GAP SERPL CALC-SCNC: 14 MMOL/L — SIGNIFICANT CHANGE UP (ref 5–17)
ANISOCYTOSIS BLD QL: SLIGHT — SIGNIFICANT CHANGE UP
AST SERPL-CCNC: 20 U/L — SIGNIFICANT CHANGE UP (ref 10–40)
BASOPHILS # BLD AUTO: 0.08 K/UL — SIGNIFICANT CHANGE UP (ref 0–0.2)
BASOPHILS NFR BLD AUTO: 1.5 % — SIGNIFICANT CHANGE UP (ref 0–2)
BILIRUB SERPL-MCNC: 1.4 MG/DL — HIGH (ref 0.2–1.2)
BUN SERPL-MCNC: 19 MG/DL — SIGNIFICANT CHANGE UP (ref 7–23)
CALCIUM SERPL-MCNC: 8.9 MG/DL — SIGNIFICANT CHANGE UP (ref 8.4–10.5)
CHLORIDE SERPL-SCNC: 96 MMOL/L — SIGNIFICANT CHANGE UP (ref 96–108)
CO2 SERPL-SCNC: 28 MMOL/L — SIGNIFICANT CHANGE UP (ref 22–31)
CREAT SERPL-MCNC: 3.92 MG/DL — HIGH (ref 0.5–1.3)
DACRYOCYTES BLD QL SMEAR: SLIGHT — SIGNIFICANT CHANGE UP
EGFR: 12 ML/MIN/1.73M2 — LOW
ELLIPTOCYTES BLD QL SMEAR: SLIGHT — SIGNIFICANT CHANGE UP
EOSINOPHIL # BLD AUTO: 0.05 K/UL — SIGNIFICANT CHANGE UP (ref 0–0.5)
EOSINOPHIL NFR BLD AUTO: 1 % — SIGNIFICANT CHANGE UP (ref 0–6)
GLUCOSE SERPL-MCNC: 83 MG/DL — SIGNIFICANT CHANGE UP (ref 70–99)
HCT VFR BLD CALC: 24.1 % — LOW (ref 34.5–45)
HGB BLD-MCNC: 7.5 G/DL — LOW (ref 11.5–15.5)
IRON SATN MFR SERPL: 163 UG/DL — HIGH (ref 30–160)
IRON SATN MFR SERPL: 71 % — HIGH (ref 14–50)
LYMPHOCYTES # BLD AUTO: 0.97 K/UL — LOW (ref 1–3.3)
LYMPHOCYTES # BLD AUTO: 18 % — SIGNIFICANT CHANGE UP (ref 13–44)
MCHC RBC-ENTMCNC: 30.9 PG — SIGNIFICANT CHANGE UP (ref 27–34)
MCHC RBC-ENTMCNC: 31.1 G/DL — LOW (ref 32–36)
MCV RBC AUTO: 99.2 FL — SIGNIFICANT CHANGE UP (ref 80–100)
METAMYELOCYTES # FLD: 1.5 % — HIGH (ref 0–0)
MONOCYTES # BLD AUTO: 0.11 K/UL — SIGNIFICANT CHANGE UP (ref 0–0.9)
MONOCYTES NFR BLD AUTO: 2 % — SIGNIFICANT CHANGE UP (ref 2–14)
MYELOCYTES NFR BLD: 2.5 % — HIGH (ref 0–0)
NEUTROPHILS # BLD AUTO: 3.96 K/UL — SIGNIFICANT CHANGE UP (ref 1.8–7.4)
NEUTROPHILS NFR BLD AUTO: 73.5 % — SIGNIFICANT CHANGE UP (ref 43–77)
NRBC # BLD: 2 /100 — HIGH (ref 0–0)
NRBC # BLD: SIGNIFICANT CHANGE UP /100 WBCS (ref 0–0)
PLAT MORPH BLD: NORMAL — SIGNIFICANT CHANGE UP
PLATELET # BLD AUTO: 119 K/UL — LOW (ref 150–400)
POIKILOCYTOSIS BLD QL AUTO: SLIGHT — SIGNIFICANT CHANGE UP
POLYCHROMASIA BLD QL SMEAR: SLIGHT — SIGNIFICANT CHANGE UP
POTASSIUM SERPL-MCNC: 4.6 MMOL/L — SIGNIFICANT CHANGE UP (ref 3.5–5.3)
POTASSIUM SERPL-SCNC: 4.6 MMOL/L — SIGNIFICANT CHANGE UP (ref 3.5–5.3)
PROT SERPL-MCNC: 7.3 G/DL — SIGNIFICANT CHANGE UP (ref 6–8.3)
RBC # BLD: 2.43 M/UL — LOW (ref 3.8–5.2)
RBC # FLD: 21.1 % — HIGH (ref 10.3–14.5)
RBC BLD AUTO: ABNORMAL
RETICS #: 90.9 K/UL — SIGNIFICANT CHANGE UP (ref 25–125)
RETICS/RBC NFR: 3.7 % — HIGH (ref 0.5–2.5)
SCHISTOCYTES BLD QL AUTO: SLIGHT — SIGNIFICANT CHANGE UP
SODIUM SERPL-SCNC: 139 MMOL/L — SIGNIFICANT CHANGE UP (ref 135–145)
TIBC SERPL-MCNC: 229 UG/DL — SIGNIFICANT CHANGE UP (ref 220–430)
UIBC SERPL-MCNC: 66 UG/DL — LOW (ref 110–370)
WBC # BLD: 5.39 K/UL — SIGNIFICANT CHANGE UP (ref 3.8–10.5)
WBC # FLD AUTO: 5.39 K/UL — SIGNIFICANT CHANGE UP (ref 3.8–10.5)

## 2022-12-09 ENCOUNTER — APPOINTMENT (OUTPATIENT)
Dept: INFUSION THERAPY | Facility: HOSPITAL | Age: 62
End: 2022-12-09

## 2022-12-09 ENCOUNTER — NON-APPOINTMENT (OUTPATIENT)
Age: 62
End: 2022-12-09

## 2022-12-12 ENCOUNTER — LABORATORY RESULT (OUTPATIENT)
Age: 62
End: 2022-12-12

## 2022-12-12 DIAGNOSIS — Z51.89 ENCOUNTER FOR OTHER SPECIFIED AFTERCARE: ICD-10-CM

## 2022-12-15 NOTE — ED PROVIDER NOTE - PLAN OF CARE
1. Please follow up with your PMD in next 1-2 days.  2. Please call [phone] tomorrow morning and schedule a follow up appointment with Dr. Manjarrez to be seen within the next week for further evaluation and management. Please bring your printed results with you to the appointment.  3. Take Tylenol 650mg 1 tab every 4-6 hours as needed for pain.  4. Return to the ED immediately if you develop any worsening symptoms, numbness/tingling in the legs, bowel/bladder incontinence, increased pain, fever/chills, or all other concerns. 1. Please follow up with your PMD in next 1-2 days.  2. Please call [phone] tomorrow morning and schedule a follow up appointment with Dr. Manjarrez to be seen within the next week for further evaluation and management. Please bring your printed results with you to the appointment.  3. Please call and schedule an appointment with neurology clinic ([phone]) to be seen within the week for further evaluation and management. Additionally follow up with your nephrologist/dialysis physician within the week. Bring results with you.  4. Take Tylenol 650mg 1 tab every 4-6 hours as needed for pain.  5. Return to the ED immediately if you develop any worsening symptoms, numbness/tingling in the legs, bowel/bladder incontinence, increased pain, fever/chills, or all other concerns. no fever and no chills.

## 2022-12-20 ENCOUNTER — OUTPATIENT (OUTPATIENT)
Dept: OUTPATIENT SERVICES | Facility: HOSPITAL | Age: 62
LOS: 1 days | Discharge: HOME | End: 2022-12-20

## 2022-12-20 DIAGNOSIS — I77.0 ARTERIOVENOUS FISTULA, ACQUIRED: Chronic | ICD-10-CM

## 2022-12-20 DIAGNOSIS — T85.898A OTHER SPECIFIED COMPLICATION OF OTHER INTERNAL PROSTHETIC DEVICES, IMPLANTS AND GRAFTS, INITIAL ENCOUNTER: Chronic | ICD-10-CM

## 2022-12-20 DIAGNOSIS — Z99.2 DEPENDENCE ON RENAL DIALYSIS: Chronic | ICD-10-CM

## 2022-12-20 DIAGNOSIS — D64.9 ANEMIA, UNSPECIFIED: ICD-10-CM

## 2022-12-25 NOTE — ED ADULT NURSE NOTE - OBJECTIVE STATEMENT
[age]y Female presents to the ED c/o RUQ pain s/p cholecystectomy 10 days ago. Pt is endorsing pain at surgical site. Pt reports getting stitches out today at surgeons office. PMH kidney failure (hemodialysis), myelofibrosis, hypertension, and Polycythemia vera. Pt daughter at bedside states that the Pts abdomen looks more swollen. Pt has medi port on upper right chest. Pt is A&Ox4. Respirations spontaneous, unlabored, and equal bilaterally. Patient safety maintained, bed is in lowest position, wheels locked, and side rails raised.

## 2022-12-27 ENCOUNTER — RESULT REVIEW (OUTPATIENT)
Age: 62
End: 2022-12-27

## 2022-12-27 ENCOUNTER — APPOINTMENT (OUTPATIENT)
Dept: ENDOVASCULAR SURGERY | Facility: CLINIC | Age: 62
End: 2022-12-27

## 2022-12-27 VITALS
WEIGHT: 119.05 LBS | DIASTOLIC BLOOD PRESSURE: 79 MMHG | HEIGHT: 62 IN | TEMPERATURE: 98.4 F | BODY MASS INDEX: 21.91 KG/M2 | HEART RATE: 89 BPM | SYSTOLIC BLOOD PRESSURE: 139 MMHG | OXYGEN SATURATION: 100 % | RESPIRATION RATE: 18 BRPM

## 2022-12-27 PROCEDURE — 36902Z: CUSTOM

## 2022-12-27 PROCEDURE — 36907Z: CUSTOM | Mod: 59

## 2022-12-27 RX ORDER — AMOXICILLIN AND CLAVULANATE POTASSIUM 500; 125 MG/1; MG/1
500-125 TABLET, FILM COATED ORAL
Qty: 6 | Refills: 0 | Status: DISCONTINUED | COMMUNITY
Start: 2022-10-05 | End: 2022-12-27

## 2022-12-27 NOTE — REASON FOR VISIT
[Other ___] : a [unfilled] visit for [Prolonged Bleeding] : prolonged bleeding [Spouse] : spouse [FreeTextEntry2] : central stenosis/ 6 week fistulogram

## 2022-12-27 NOTE — ASSESSMENT
[Other: _____] : [unfilled] [FreeTextEntry1] : known central stenosis with prolonged bleeding-plan for fistulogram and possible intervention

## 2022-12-27 NOTE — HISTORY OF PRESENT ILLNESS
[] : left radiocephalic fistula [FreeTextEntry1] : Dr. Dempsey 9/18/17 [FreeTextEntry4] : Yesterday  [FreeTextEntry5] : Yesterday at 10pm  [FreeTextEntry6] : Dr. Koroma

## 2022-12-29 ENCOUNTER — RESULT REVIEW (OUTPATIENT)
Age: 62
End: 2022-12-29

## 2022-12-29 ENCOUNTER — APPOINTMENT (OUTPATIENT)
Dept: INFUSION THERAPY | Facility: HOSPITAL | Age: 62
End: 2022-12-29

## 2022-12-29 LAB
ANISOCYTOSIS BLD QL: SLIGHT — SIGNIFICANT CHANGE UP
BASOPHILS # BLD AUTO: 0 K/UL — SIGNIFICANT CHANGE UP (ref 0–0.2)
BASOPHILS NFR BLD AUTO: 0 % — SIGNIFICANT CHANGE UP (ref 0–2)
DACRYOCYTES BLD QL SMEAR: SLIGHT — SIGNIFICANT CHANGE UP
ELLIPTOCYTES BLD QL SMEAR: SLIGHT — SIGNIFICANT CHANGE UP
EOSINOPHIL # BLD AUTO: 0.1 K/UL — SIGNIFICANT CHANGE UP (ref 0–0.5)
EOSINOPHIL NFR BLD AUTO: 2 % — SIGNIFICANT CHANGE UP (ref 0–6)
HCT VFR BLD CALC: 23.4 % — LOW (ref 34.5–45)
HGB BLD-MCNC: 7.2 G/DL — LOW (ref 11.5–15.5)
LYMPHOCYTES # BLD AUTO: 1.49 K/UL — SIGNIFICANT CHANGE UP (ref 1–3.3)
LYMPHOCYTES # BLD AUTO: 30 % — SIGNIFICANT CHANGE UP (ref 13–44)
MCHC RBC-ENTMCNC: 30.5 PG — SIGNIFICANT CHANGE UP (ref 27–34)
MCHC RBC-ENTMCNC: 30.8 G/DL — LOW (ref 32–36)
MCV RBC AUTO: 99.2 FL — SIGNIFICANT CHANGE UP (ref 80–100)
METAMYELOCYTES # FLD: 1 % — HIGH (ref 0–0)
MONOCYTES # BLD AUTO: 0.2 K/UL — SIGNIFICANT CHANGE UP (ref 0–0.9)
MONOCYTES NFR BLD AUTO: 4 % — SIGNIFICANT CHANGE UP (ref 2–14)
MYELOCYTES NFR BLD: 1 % — HIGH (ref 0–0)
NEUTROPHILS # BLD AUTO: 3.08 K/UL — SIGNIFICANT CHANGE UP (ref 1.8–7.4)
NEUTROPHILS NFR BLD AUTO: 62 % — SIGNIFICANT CHANGE UP (ref 43–77)
NRBC # BLD: 5 /100 — HIGH (ref 0–0)
NRBC # BLD: SIGNIFICANT CHANGE UP /100 WBCS (ref 0–0)
PLAT MORPH BLD: NORMAL — SIGNIFICANT CHANGE UP
PLATELET # BLD AUTO: 120 K/UL — LOW (ref 150–400)
POIKILOCYTOSIS BLD QL AUTO: SLIGHT — SIGNIFICANT CHANGE UP
POLYCHROMASIA BLD QL SMEAR: SLIGHT — SIGNIFICANT CHANGE UP
RBC # BLD: 2.36 M/UL — LOW (ref 3.8–5.2)
RBC # FLD: 22.2 % — HIGH (ref 10.3–14.5)
RBC BLD AUTO: ABNORMAL
SCHISTOCYTES BLD QL AUTO: SLIGHT — SIGNIFICANT CHANGE UP
WBC # BLD: 4.96 K/UL — SIGNIFICANT CHANGE UP (ref 3.8–10.5)
WBC # FLD AUTO: 4.96 K/UL — SIGNIFICANT CHANGE UP (ref 3.8–10.5)

## 2022-12-30 ENCOUNTER — APPOINTMENT (OUTPATIENT)
Dept: INFUSION THERAPY | Facility: HOSPITAL | Age: 62
End: 2022-12-30

## 2022-12-30 DIAGNOSIS — D45 POLYCYTHEMIA VERA: ICD-10-CM

## 2022-12-30 PROCEDURE — 86922 COMPATIBILITY TEST ANTIGLOB: CPT

## 2022-12-30 PROCEDURE — 86902 BLOOD TYPE ANTIGEN DONOR EA: CPT

## 2022-12-30 PROCEDURE — 86850 RBC ANTIBODY SCREEN: CPT

## 2022-12-30 PROCEDURE — 86901 BLOOD TYPING SEROLOGIC RH(D): CPT

## 2022-12-30 PROCEDURE — 86880 COOMBS TEST DIRECT: CPT

## 2022-12-30 PROCEDURE — 86900 BLOOD TYPING SEROLOGIC ABO: CPT

## 2023-01-03 DIAGNOSIS — Z51.89 ENCOUNTER FOR OTHER SPECIFIED AFTERCARE: ICD-10-CM

## 2023-01-03 DIAGNOSIS — N18.6 END STAGE RENAL DISEASE: ICD-10-CM

## 2023-01-03 DIAGNOSIS — D75.81 MYELOFIBROSIS: ICD-10-CM

## 2023-01-05 ENCOUNTER — OUTPATIENT (OUTPATIENT)
Dept: OUTPATIENT SERVICES | Facility: HOSPITAL | Age: 63
LOS: 1 days | End: 2023-01-05

## 2023-01-05 ENCOUNTER — APPOINTMENT (OUTPATIENT)
Dept: GASTROENTEROLOGY | Facility: CLINIC | Age: 63
End: 2023-01-05
Payer: COMMERCIAL

## 2023-01-05 VITALS
HEIGHT: 62 IN | BODY MASS INDEX: 23 KG/M2 | HEART RATE: 76 BPM | OXYGEN SATURATION: 97 % | WEIGHT: 125 LBS | DIASTOLIC BLOOD PRESSURE: 70 MMHG | SYSTOLIC BLOOD PRESSURE: 110 MMHG

## 2023-01-05 DIAGNOSIS — T85.898A OTHER SPECIFIED COMPLICATION OF OTHER INTERNAL PROSTHETIC DEVICES, IMPLANTS AND GRAFTS, INITIAL ENCOUNTER: Chronic | ICD-10-CM

## 2023-01-05 DIAGNOSIS — Z99.2 DEPENDENCE ON RENAL DIALYSIS: Chronic | ICD-10-CM

## 2023-01-05 DIAGNOSIS — I77.0 ARTERIOVENOUS FISTULA, ACQUIRED: Chronic | ICD-10-CM

## 2023-01-05 PROCEDURE — ZZZZZ: CPT

## 2023-01-05 PROCEDURE — 99204 OFFICE O/P NEW MOD 45 MIN: CPT

## 2023-01-06 DIAGNOSIS — I86.4 GASTRIC VARICES: ICD-10-CM

## 2023-01-09 ENCOUNTER — LABORATORY RESULT (OUTPATIENT)
Age: 63
End: 2023-01-09

## 2023-01-12 ENCOUNTER — OUTPATIENT (OUTPATIENT)
Dept: OUTPATIENT SERVICES | Facility: HOSPITAL | Age: 63
LOS: 1 days | End: 2023-01-12
Payer: MEDICAID

## 2023-01-12 ENCOUNTER — RESULT REVIEW (OUTPATIENT)
Age: 63
End: 2023-01-12

## 2023-01-12 ENCOUNTER — OUTPATIENT (OUTPATIENT)
Dept: OUTPATIENT SERVICES | Facility: HOSPITAL | Age: 63
LOS: 1 days | End: 2023-01-12
Payer: SELF-PAY

## 2023-01-12 ENCOUNTER — APPOINTMENT (OUTPATIENT)
Dept: INFUSION THERAPY | Facility: HOSPITAL | Age: 63
End: 2023-01-12

## 2023-01-12 ENCOUNTER — APPOINTMENT (OUTPATIENT)
Dept: INTERNAL MEDICINE | Facility: CLINIC | Age: 63
End: 2023-01-12
Payer: COMMERCIAL

## 2023-01-12 ENCOUNTER — TRANSCRIPTION ENCOUNTER (OUTPATIENT)
Age: 63
End: 2023-01-12

## 2023-01-12 VITALS
DIASTOLIC BLOOD PRESSURE: 70 MMHG | OXYGEN SATURATION: 98 % | BODY MASS INDEX: 22.47 KG/M2 | SYSTOLIC BLOOD PRESSURE: 110 MMHG | HEART RATE: 69 BPM | WEIGHT: 119 LBS | HEIGHT: 61 IN

## 2023-01-12 DIAGNOSIS — R53.83 OTHER FATIGUE: ICD-10-CM

## 2023-01-12 DIAGNOSIS — Z99.2 DEPENDENCE ON RENAL DIALYSIS: Chronic | ICD-10-CM

## 2023-01-12 DIAGNOSIS — R21 RASH AND OTHER NONSPECIFIC SKIN ERUPTION: ICD-10-CM

## 2023-01-12 DIAGNOSIS — Z09 ENCOUNTER FOR FOLLOW-UP EXAMINATION AFTER COMPLETED TREATMENT FOR CONDITIONS OTHER THAN MALIGNANT NEOPLASM: ICD-10-CM

## 2023-01-12 DIAGNOSIS — I86.4 GASTRIC VARICES: ICD-10-CM

## 2023-01-12 DIAGNOSIS — D45 POLYCYTHEMIA VERA: ICD-10-CM

## 2023-01-12 DIAGNOSIS — N18.6 END STAGE RENAL DISEASE: ICD-10-CM

## 2023-01-12 DIAGNOSIS — D75.84: ICD-10-CM

## 2023-01-12 DIAGNOSIS — Z92.29 PERSONAL HISTORY OF OTHER DRUG THERAPY: ICD-10-CM

## 2023-01-12 DIAGNOSIS — T82.838A HEMORRHAGE DUE VASCULAR PROSTHETIC DEVICES, IMPLANTS AND GRAFTS, INITIAL ENCOUNTER: ICD-10-CM

## 2023-01-12 DIAGNOSIS — Z86.79 PERSONAL HISTORY OF OTHER DISEASES OF THE CIRCULATORY SYSTEM: ICD-10-CM

## 2023-01-12 DIAGNOSIS — I77.0 ARTERIOVENOUS FISTULA, ACQUIRED: Chronic | ICD-10-CM

## 2023-01-12 DIAGNOSIS — Z86.19 PERSONAL HISTORY OF OTHER INFECTIOUS AND PARASITIC DISEASES: ICD-10-CM

## 2023-01-12 DIAGNOSIS — Z01.818 ENCOUNTER FOR OTHER PREPROCEDURAL EXAMINATION: ICD-10-CM

## 2023-01-12 DIAGNOSIS — T85.898A OTHER SPECIFIED COMPLICATION OF OTHER INTERNAL PROSTHETIC DEVICES, IMPLANTS AND GRAFTS, INITIAL ENCOUNTER: Chronic | ICD-10-CM

## 2023-01-12 DIAGNOSIS — D64.9 ANEMIA, UNSPECIFIED: ICD-10-CM

## 2023-01-12 LAB
ANISOCYTOSIS BLD QL: SLIGHT — SIGNIFICANT CHANGE UP
BASOPHILS # BLD AUTO: 0.04 K/UL — SIGNIFICANT CHANGE UP (ref 0–0.2)
BASOPHILS NFR BLD AUTO: 1 % — SIGNIFICANT CHANGE UP (ref 0–2)
DACRYOCYTES BLD QL SMEAR: SLIGHT — SIGNIFICANT CHANGE UP
ELLIPTOCYTES BLD QL SMEAR: SLIGHT — SIGNIFICANT CHANGE UP
EOSINOPHIL # BLD AUTO: 0 K/UL — SIGNIFICANT CHANGE UP (ref 0–0.5)
EOSINOPHIL NFR BLD AUTO: 0 % — SIGNIFICANT CHANGE UP (ref 0–6)
HCT VFR BLD CALC: 27.1 % — LOW (ref 34.5–45)
HGB BLD-MCNC: 8.6 G/DL — LOW (ref 11.5–15.5)
LYMPHOCYTES # BLD AUTO: 1.3 K/UL — SIGNIFICANT CHANGE UP (ref 1–3.3)
LYMPHOCYTES # BLD AUTO: 32 % — SIGNIFICANT CHANGE UP (ref 13–44)
MCHC RBC-ENTMCNC: 30.8 PG — SIGNIFICANT CHANGE UP (ref 27–34)
MCHC RBC-ENTMCNC: 31.7 G/DL — LOW (ref 32–36)
MCV RBC AUTO: 97.1 FL — SIGNIFICANT CHANGE UP (ref 80–100)
METAMYELOCYTES # FLD: 1 % — HIGH (ref 0–0)
MONOCYTES # BLD AUTO: 0.16 K/UL — SIGNIFICANT CHANGE UP (ref 0–0.9)
MONOCYTES NFR BLD AUTO: 4 % — SIGNIFICANT CHANGE UP (ref 2–14)
MYELOCYTES NFR BLD: 1 % — HIGH (ref 0–0)
NEUTROPHILS # BLD AUTO: 2.48 K/UL — SIGNIFICANT CHANGE UP (ref 1.8–7.4)
NEUTROPHILS NFR BLD AUTO: 61 % — SIGNIFICANT CHANGE UP (ref 43–77)
NRBC # BLD: 4 /100 — HIGH (ref 0–0)
NRBC # BLD: SIGNIFICANT CHANGE UP /100 WBCS (ref 0–0)
PLAT MORPH BLD: NORMAL — SIGNIFICANT CHANGE UP
PLATELET # BLD AUTO: 114 K/UL — LOW (ref 150–400)
POIKILOCYTOSIS BLD QL AUTO: SLIGHT — SIGNIFICANT CHANGE UP
RBC # BLD: 2.79 M/UL — LOW (ref 3.8–5.2)
RBC # FLD: 20.6 % — HIGH (ref 10.3–14.5)
RBC BLD AUTO: ABNORMAL
SCHISTOCYTES BLD QL AUTO: SLIGHT — SIGNIFICANT CHANGE UP
WBC # BLD: 4.07 K/UL — SIGNIFICANT CHANGE UP (ref 3.8–10.5)
WBC # FLD AUTO: 4.07 K/UL — SIGNIFICANT CHANGE UP (ref 3.8–10.5)

## 2023-01-12 PROCEDURE — G0463: CPT | Mod: 25

## 2023-01-12 PROCEDURE — T1013: CPT

## 2023-01-12 PROCEDURE — ZZZZZ: CPT

## 2023-01-13 ENCOUNTER — APPOINTMENT (OUTPATIENT)
Dept: INFUSION THERAPY | Facility: HOSPITAL | Age: 63
End: 2023-01-13

## 2023-01-13 PROBLEM — T82.838A HEMORRHAGE DUE TO VASCULAR PROSTHETIC DEVICES, IMPLANTS AND GRAFTS, INITIAL ENCOUNTER: Status: RESOLVED | Noted: 2018-12-12 | Resolved: 2023-01-13

## 2023-01-13 PROBLEM — I86.4 GASTRIC VARICES: Status: RESOLVED | Noted: 2023-01-05 | Resolved: 2023-01-13

## 2023-01-13 PROBLEM — Z86.79 HISTORY OF HYPOTENSION: Status: RESOLVED | Noted: 2019-05-31 | Resolved: 2023-01-13

## 2023-01-13 RX ORDER — PANTOPRAZOLE 40 MG/1
40 TABLET, DELAYED RELEASE ORAL
Qty: 90 | Refills: 0 | Status: COMPLETED | COMMUNITY
Start: 2022-11-03 | End: 2023-01-13

## 2023-01-13 NOTE — PHYSICAL EXAM
[No Acute Distress] : no acute distress [Well Nourished] : well nourished [Well Developed] : well developed [Well-Appearing] : well-appearing [Normal Sclera/Conjunctiva] : normal sclera/conjunctiva [PERRL] : pupils equal round and reactive to light [EOMI] : extraocular movements intact [Normal Outer Ear/Nose] : the outer ears and nose were normal in appearance [Normal TMs] : both tympanic membranes were normal [No Lymphadenopathy] : no lymphadenopathy [Supple] : supple [Thyroid Normal, No Nodules] : the thyroid was normal and there were no nodules present [No Respiratory Distress] : no respiratory distress  [No Accessory Muscle Use] : no accessory muscle use [Clear to Auscultation] : lungs were clear to auscultation bilaterally [Normal Rate] : normal rate  [Regular Rhythm] : with a regular rhythm [Normal S1, S2] : normal S1 and S2 [No Murmur] : no murmur heard [Pedal Pulses Present] : the pedal pulses are present [No Edema] : there was no peripheral edema [Soft] : abdomen soft [Non Tender] : non-tender [Non-distended] : non-distended [Normal Bowel Sounds] : normal bowel sounds [Normal Posterior Cervical Nodes] : no posterior cervical lymphadenopathy [Normal Anterior Cervical Nodes] : no anterior cervical lymphadenopathy [No CVA Tenderness] : no CVA  tenderness [No Spinal Tenderness] : no spinal tenderness [Normal Gait] : normal gait [Normal Affect] : the affect was normal [Normal Insight/Judgement] : insight and judgment were intact

## 2023-01-18 PROBLEM — R53.83 FATIGUE: Status: ACTIVE | Noted: 2019-05-31

## 2023-01-18 PROBLEM — Z92.29 HISTORY OF HEPATITIS B VACCINATION: Status: RESOLVED | Noted: 2021-01-26 | Resolved: 2023-01-18

## 2023-01-18 PROBLEM — Z09 HOSPITAL DISCHARGE FOLLOW-UP: Status: RESOLVED | Noted: 2022-09-26 | Resolved: 2023-01-18

## 2023-01-18 PROBLEM — R21 RASH: Status: RESOLVED | Noted: 2017-07-27 | Resolved: 2023-01-18

## 2023-01-18 NOTE — HEALTH RISK ASSESSMENT
[Intercurrent hospitalizations] : was admitted to the hospital  [Former] : Former [10-14] : 10-14 [No] : In the past 12 months have you used drugs other than those required for medical reasons? No [No falls in past year] : Patient reported no falls in the past year [PHQ-2 Negative - No further assessment needed] : PHQ-2 Negative - No further assessment needed [Several Days (1)] : 4.) Feeling tired or having little energy? Several days [Not at All (0)] : 8.) Moving or speaking so slowly that other people could have noticed, or the opposite, moving or speaking faster than usual? Not at all [Not at all] : How difficult have these problems made it for you to do your work, take care of things at home, or get along with people? Not at all [PHQ-9 Negative - No further assessment needed] : PHQ-9 Negative - No further assessment needed [Patient reported mammogram was normal] : Patient reported mammogram was normal [None] : None [With Family] : lives with family [# of Members in Household ___] :  household currently consist of [unfilled] member(s) [] :  [Feels Safe at Home] : Feels safe at home [Fully functional (bathing, dressing, toileting, transferring, walking, feeding)] : Fully functional (bathing, dressing, toileting, transferring, walking, feeding) [Fully functional (using the telephone, shopping, preparing meals, housekeeping, doing laundry, using] : Fully functional and needs no help or supervision to perform IADLs (using the telephone, shopping, preparing meals, housekeeping, doing laundry, using transportation, managing medications and managing finances) [With Patient/Caregiver] : , with patient/caregiver [Designated Healthcare Proxy] : Designated healthcare proxy [I will adhere to the patient's wishes.] : I will adhere to the patient's wishes. [Time Spent: ___ minutes] : Time Spent: [unfilled] minutes [de-identified] : GI, hematology, nephrology [YearQuit] : 2013 [de-identified] : As above [de-identified] : As above [CVM7Hqnbq] : 0 [[id number]] : 2 [Change in mental status noted] : No change in mental status noted [Reports changes in hearing] : Reports no changes in hearing [Reports changes in vision] : Reports no changes in vision [Reports changes in dental health] : Reports no changes in dental health [MammogramDate] : 06/19 [MammogramComments] : BI-RADS 1 [HepatitisCDate] : 12/17 [HepatitisCComments] : Negative [AdvancecareDate] : 01/23 [FreeTextEntry4] : Initiated conversations regarding healthcare proxy and advanced directives. She designated that her proxy would likely be her . Provided patient with HCP form in Indonesian to be reviewed, filled out, and re-discussed at follow up visit.

## 2023-01-18 NOTE — ASSESSMENT
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) formerly on peritoneal dialysis, HTN, acute cholecystitis s/p ERCP c/b pancreatitis & cholecystectomy who presents for CPE.\par \par #H/o acute cholecystitis s/p cholecystectomy\par - Recovering well, has surgery & GI follow up\par - Continued symptom management for surgical site tenderness\par - Tolerating diet, minimal RUQ pain\par \par #Fatigue\par - Fatigue likely multifactorial i/s/o insomnia, myelofibrosis, depression\par - PHQ-2 0, patient not interested in counseling services\par - Myelofibrosis management as per below\par - Discussed sleep hygiene\par \par #GERD\par - Refilled famotidine 20mg qd\par - D/c'ed ppi\par \par #HTN\par - BP WNL on current regimen\par - Continue Coreg 6.25mg bid per GI\par \par #Splenic vein thrombosis w/ gastric varices\par - Patient with good GI follow up\par - Continue Coreg as above\par - EGD scheduled for 1/27\par \par #Myelofibrosis\par - Follow up with hematology as recommended\par - Continue Jakafi\par \par #ESRD on HD MWF \par - Continue nephrology f/u as recommended\par - Patient has never taken Plavix as listed in med rec (for fistula stenosis?), will attempt to touch base with vascular\par \par #HCM\par - S/p COVID vaccine x2\par - Received flu shot this year\par - No lab work, as patient with routine labs, A1C 4.9 in 2019, lipid profile WNL in 2019\par - Mammogram ordered\par - Medications reviewed & reconciled, refilled as needed\par - Healthy diet & exercise habits discussed\par - RTC 3 months or prn\par \par Discussed with Dr. Cho\par \par Michael Calderón MD\par PGY-2, [facility]

## 2023-01-18 NOTE — REVIEW OF SYSTEMS
[Hot Flashes] : hot flashes [Vision Problems] : vision problems [Shortness Of Breath] : shortness of breath [Headache] : headache [Negative] : Constitutional [Discharge] : no discharge [Pain] : no pain [Hearing Loss] : no hearing loss [Chest Pain] : no chest pain [Wheezing] : no wheezing [Cough] : no cough [Abdominal Pain] : no abdominal pain [Constipation] : no constipation [Diarrhea] : diarrhea [Dysuria] : no dysuria [Frequency] : no frequency [Joint Pain] : no joint pain [Joint Stiffness] : no joint stiffness [Muscle Pain] : no muscle pain [Skin Rash] : no skin rash [Dizziness] : no dizziness [Fainting] : no fainting

## 2023-01-18 NOTE — HISTORY OF PRESENT ILLNESS
[FreeTextEntry1] : CPE [de-identified] : 58 woman with a history of Polycythemia Vera, MAK 2 + since 2012 c/bhistory of splenic vein thrombosis w/ gastric varices (2015), ESRD on HD (Tu/Th/Sat) via LUE AVF (2/2 chronic GN, started Dec 2017) formerly on peritoneal dialysis, HTN, acute cholecystitis s/p ERCP c/b pancreatitis & cholecystectomy who presents for CPE.\par \par Today, she reports some continued surgical site tenderness & numbness. She has GI follow up and is scheduled for EGD on 1/27. She is tolerating food well and has normal bowel movements. Her coreg dose was increased to 6.25mg bid and she states her BP readings are now 102-107 systolic. She has good follow up with hematology for her myelofibrosis & nephrology for her ESRD. She reports that she has never taken clopidogrel as listed in her med rec here. \par \par She also is endorsing some fatigue after meals, usually breakfast. She attests this to poor sleep. States she can fall asleep but wakes up after an hour. She feels that she could have better sleep hygiene, reporting she goes to bed at different times each night and spends time in bed during the day. She is also reporting some GERD symptoms but reports it is well controlled with famotidine. \par \par She reports eating a typical [place] diet with rice, some vegetables, and meat. She describes it as "balanced". She is not physically active. Lives with  and daughter and reports feeling safe at home. No elicited barriers to care noted.

## 2023-01-18 NOTE — INTERPRETER SERVICES
[[employer] ] : provided by [employer]   [Time Spent: ____ minutes] : Total time spent using  services: [unfilled] minutes. The patient's primary language is not English thus required  services. [Interpreters_IDNumber] : [id number] [Interpreters_FullName] : Johana [TWNoteComboBox1] : Japanese

## 2023-01-20 DIAGNOSIS — I10 ESSENTIAL (PRIMARY) HYPERTENSION: ICD-10-CM

## 2023-01-20 NOTE — PHYSICAL EXAM
[Alert] : alert [Well Developed] : well developed [Well Nourished] : well nourished [Hearing Threshold Finger Rub Not [place]] : hearing was normal [Normal Appearance] : the appearance of the neck was normal [No Respiratory Distress] : no respiratory distress [No Acc Muscle Use] : no accessory muscle use [Abdomen Tenderness] : non-tender [Abnormal Walk] : normal gait [No Clubbing, Cyanosis] : no clubbing or cyanosis of the fingernails [No Joint Swelling] : no joint swelling seen [Oriented To Time, Place, And Person] : oriented to person, place, and time [Normal Affect] : the affect was normal [Normal Mood] : the mood was normal [Ascites: ___] : no ascites [Rebound Tenderness] : no rebound tenderness

## 2023-01-20 NOTE — HISTORY OF PRESENT ILLNESS
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 Briefly, patient known to have gastric varices since 2014 on imaging. s/p liver biopsy w/ elevated portosystemic gradient of 9 with histology demonstrating nodular regenerative hyperplasia but no fibrosis. Underwent EGD in 2021 with gastric varices (IGV1 and IGV2) not amenable to endoscopic therapy. Patient is presenting for followup of gastric varices. \par \par [place]ic : [id number]\par \par Patient had recent ERCP done on 9/22/22 for choledocholithiasis s/p metal stent. \par Patient tolerating food well. Does have some tenderness over surgical sites.  16

## 2023-01-20 NOTE — REASON FOR VISIT
[Initial Evaluation] : an initial evaluation [FreeTextEntry1] : abnormal MRCP, post hospital followup

## 2023-01-20 NOTE — ASSESSMENT
[FreeTextEntry1] : Impression: \par \par #Hx of Choledocholithiasis s/p ERCP on 9/22/22 with metal stent complicated with pancreatitis\par #Nodular regenerative hyperplasia \par #noncirrhotic portal HTN \par #Gastric varices, size seem stable in ERCP compared to last EGD in 1/2021. \par \par Recommendations: \par - increase Coreg 6.25mg BID; patient is currently on Coreg 3.125mg BID \par - patient with poor awareness of medications so discussed with  and patient as well as patient's daughter on the phone  \par - if patient tolerates Coreg 6.25mg BID well, will increase to 12.5 BID at next visit (to avoid confusion) \par - advised patient that if patient is dizzy, to stop medication and call office \par - EGD for gastric varices surveillance \par - COVID 3 days prior \par - discussed risks of EGD including infection, bleeding, perforation\par - ERCP with metal stent--removed on 10/17\par - followup in 1 month in hepatology clinic  \par \par Michela Oliveira MD, PGY4\par GI/Hepatology Fellow

## 2023-01-24 ENCOUNTER — RESULT REVIEW (OUTPATIENT)
Age: 63
End: 2023-01-24

## 2023-01-24 ENCOUNTER — APPOINTMENT (OUTPATIENT)
Dept: HEMATOLOGY ONCOLOGY | Facility: CLINIC | Age: 63
End: 2023-01-24

## 2023-01-24 ENCOUNTER — LABORATORY RESULT (OUTPATIENT)
Age: 63
End: 2023-01-24

## 2023-01-24 ENCOUNTER — APPOINTMENT (OUTPATIENT)
Dept: HEMATOLOGY ONCOLOGY | Facility: CLINIC | Age: 63
End: 2023-01-24
Payer: COMMERCIAL

## 2023-01-24 ENCOUNTER — APPOINTMENT (OUTPATIENT)
Dept: INFUSION THERAPY | Facility: HOSPITAL | Age: 63
End: 2023-01-24

## 2023-01-24 VITALS
BODY MASS INDEX: 22.91 KG/M2 | SYSTOLIC BLOOD PRESSURE: 135 MMHG | TEMPERATURE: 97.1 F | OXYGEN SATURATION: 99 % | HEART RATE: 67 BPM | WEIGHT: 121.23 LBS | RESPIRATION RATE: 16 BRPM | DIASTOLIC BLOOD PRESSURE: 83 MMHG

## 2023-01-24 DIAGNOSIS — Z00.00 ENCOUNTER FOR GENERAL ADULT MEDICAL EXAMINATION WITHOUT ABNORMAL FINDINGS: ICD-10-CM

## 2023-01-24 DIAGNOSIS — R53.83 OTHER FATIGUE: ICD-10-CM

## 2023-01-24 DIAGNOSIS — N18.6 END STAGE RENAL DISEASE: ICD-10-CM

## 2023-01-24 LAB
ANISOCYTOSIS BLD QL: SLIGHT — SIGNIFICANT CHANGE UP
BASOPHILS # BLD AUTO: 0 K/UL — SIGNIFICANT CHANGE UP (ref 0–0.2)
BASOPHILS NFR BLD AUTO: 0 % — SIGNIFICANT CHANGE UP (ref 0–2)
DACRYOCYTES BLD QL SMEAR: SLIGHT — SIGNIFICANT CHANGE UP
ELLIPTOCYTES BLD QL SMEAR: SLIGHT — SIGNIFICANT CHANGE UP
EOSINOPHIL # BLD AUTO: 0 K/UL — SIGNIFICANT CHANGE UP (ref 0–0.5)
EOSINOPHIL NFR BLD AUTO: 0 % — SIGNIFICANT CHANGE UP (ref 0–6)
HCT VFR BLD CALC: 30.1 % — LOW (ref 34.5–45)
HGB BLD-MCNC: 9.3 G/DL — LOW (ref 11.5–15.5)
HYPOSEGMENTATION: PRESENT — SIGNIFICANT CHANGE UP
LYMPHOCYTES # BLD AUTO: 1.11 K/UL — SIGNIFICANT CHANGE UP (ref 1–3.3)
LYMPHOCYTES # BLD AUTO: 19 % — SIGNIFICANT CHANGE UP (ref 13–44)
MCHC RBC-ENTMCNC: 30.8 PG — SIGNIFICANT CHANGE UP (ref 27–34)
MCHC RBC-ENTMCNC: 30.9 G/DL — LOW (ref 32–36)
MCV RBC AUTO: 99.7 FL — SIGNIFICANT CHANGE UP (ref 80–100)
MONOCYTES # BLD AUTO: 0.29 K/UL — SIGNIFICANT CHANGE UP (ref 0–0.9)
MONOCYTES NFR BLD AUTO: 5 % — SIGNIFICANT CHANGE UP (ref 2–14)
MYELOCYTES NFR BLD: 1 % — HIGH (ref 0–0)
NEUTROPHILS # BLD AUTO: 4.38 K/UL — SIGNIFICANT CHANGE UP (ref 1.8–7.4)
NEUTROPHILS NFR BLD AUTO: 75 % — SIGNIFICANT CHANGE UP (ref 43–77)
NRBC # BLD: 5 /100 — HIGH (ref 0–0)
NRBC # BLD: SIGNIFICANT CHANGE UP /100 WBCS (ref 0–0)
PLAT MORPH BLD: NORMAL — SIGNIFICANT CHANGE UP
PLATELET # BLD AUTO: 141 K/UL — LOW (ref 150–400)
POIKILOCYTOSIS BLD QL AUTO: SLIGHT — SIGNIFICANT CHANGE UP
RBC # BLD: 3.02 M/UL — LOW (ref 3.8–5.2)
RBC # FLD: 20.7 % — HIGH (ref 10.3–14.5)
RBC BLD AUTO: ABNORMAL
RETICS #: 116.9 K/UL — SIGNIFICANT CHANGE UP (ref 25–125)
RETICS/RBC NFR: 3.9 % — HIGH (ref 0.5–2.5)
SCHISTOCYTES BLD QL AUTO: SLIGHT — SIGNIFICANT CHANGE UP
WBC # BLD: 5.84 K/UL — SIGNIFICANT CHANGE UP (ref 3.8–10.5)
WBC # FLD AUTO: 5.84 K/UL — SIGNIFICANT CHANGE UP (ref 3.8–10.5)

## 2023-01-24 PROCEDURE — 99214 OFFICE O/P EST MOD 30 MIN: CPT

## 2023-01-24 NOTE — PATIENT PROFILE ADULT - BRAND OF COVID-19 VACCINATION
Dr. Semaj Moses's nurse called in, on behalf of Patient. Patient saw Dr More last Friday, 1/20,for a FX Rt Wrist. In a lot of pain, and down to 2-3 Norco's. Nurse is asking who will be managing Patient's pain? Dr. More? Or Dr. Cha? Please call Nurse back @ [phone].    Pfizer dose 1 and 2

## 2023-01-25 NOTE — ASSESSMENT
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  She is now under my super vision for the P. Vera. \par Jakafi was initially started at 15mg TIW.  Dose was decreased to 10mg TIW due to anemia though that was likely form the lack of danazol. 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 Would continue this.  \par Dose was decreased to 10mg TIW with dialysis, but did not help with the anemia.  Patient was then started on danazol 400mg daily, then 600mg daily, which at the time, we had thought this improved her HG from the 6's range requiring transfusion to 8.5-9.5g/dl consistently, for the most part eliminating her transfusion dependance.  \par \par There was an episode in december during which Hg had falled to 7.2 ,and patient was transfused again to a Hg 10.0g/dl, in january this has not recurred. I am uncertain about how or why this happened, but the hg has been stable this January. Can disconitnue the Q 2 week blood draws for type and screen again. It seems the problem has subsided.  \par \par On the Jakafi 10mg TIW dose patient did have a recurrence of the abdominal pain, and tender hepatosplenomegaly.  Patient's Jakafi was increased back up to the 15mg TIW dosing in July 2022 which resolved these symptoms again.  She remains on this dose now.  \par \par Both the danazol 600mg daily and the Jakafi 15mg TIW has been helpful in decreasing transfusion dependance and decreasing painful symptoms of the hepatosplenomegaly.  These are both effective and medically necessary in her care.  \par

## 2023-01-25 NOTE — HISTORY OF PRESENT ILLNESS
[de-identified] : Patient's returns for follow up of her secondary myelofibrosis.  Hg had dropped into the 7's again requiring transfusion in December. More recently Hg 8.6g/dl then 9.3g/d after he Hg 10.0g/dl after the transfusion.\par Patient states that she was complaint with the Danazol 600mg daily this time, did not miss doses, missed a couple of doses of Jakafi due to the prescription not coming in on time in December, but this would not have explained that Drop.   AT the very least its much better this January, not requiring transfusions.  \par \par Does have headaches following dialysis.  But this was a longstanding effect.  \par \par \par In November was in the hospital 10/25 with RUQ pain, dilated cystic duct.  \par GI is planning what she describe to be a capsule study. explained that hits is a good idea given the anemia.  \par \par Ferritin remained in the 1700's.  \par  [place] [id number] [place].

## 2023-01-25 NOTE — PHYSICAL EXAM
[Normal] : normal appearance, no rash, nodules, vesicles, ulcers, erythema [de-identified] : Spleen palpable 2cm below left rib angle.

## 2023-01-26 ENCOUNTER — APPOINTMENT (OUTPATIENT)
Dept: INFUSION THERAPY | Facility: HOSPITAL | Age: 63
End: 2023-01-26

## 2023-01-27 ENCOUNTER — APPOINTMENT (OUTPATIENT)
Dept: INFUSION THERAPY | Facility: HOSPITAL | Age: 63
End: 2023-01-27

## 2023-02-01 LAB
ALBUMIN SERPL ELPH-MCNC: 4.9 G/DL
ALP BLD-CCNC: 86 U/L
ALT SERPL-CCNC: 21 U/L
ANION GAP SERPL CALC-SCNC: 17 MMOL/L
AST SERPL-CCNC: 21 U/L
BILIRUB INDIRECT SERPL-MCNC: 2.8 MG/DL
BILIRUB SERPL-MCNC: 3.2 MG/DL
BUN SERPL-MCNC: 29 MG/DL
CALCIUM SERPL-MCNC: 9.3 MG/DL
CHLORIDE SERPL-SCNC: 95 MMOL/L
CO2 SERPL-SCNC: 28 MMOL/L
CREAT SERPL-MCNC: 5.43 MG/DL
EGFR: 8 ML/MIN/1.73M2
FERRITIN SERPL-MCNC: 1791 NG/ML
FOLATE SERPL-MCNC: 15.5 NG/ML
GLUCOSE SERPL-MCNC: 96 MG/DL
HAPTOGLOB SERPL-MCNC: <20 MG/DL
IRON SATN MFR SERPL: 45 %
IRON SERPL-MCNC: 125 UG/DL
LDH SERPL-CCNC: 388 U/L
POTASSIUM SERPL-SCNC: 5.3 MMOL/L
PROT SERPL-MCNC: 7.7 G/DL
SODIUM SERPL-SCNC: 140 MMOL/L
TIBC SERPL-MCNC: 277 UG/DL
UIBC SERPL-MCNC: 152 UG/DL
VIT B12 SERPL-MCNC: 1170 PG/ML

## 2023-02-13 ENCOUNTER — LABORATORY RESULT (OUTPATIENT)
Age: 63
End: 2023-02-13

## 2023-02-14 ENCOUNTER — LABORATORY RESULT (OUTPATIENT)
Age: 63
End: 2023-02-14

## 2023-02-15 ENCOUNTER — LABORATORY RESULT (OUTPATIENT)
Age: 63
End: 2023-02-15

## 2023-02-23 ENCOUNTER — APPOINTMENT (OUTPATIENT)
Dept: ENDOVASCULAR SURGERY | Facility: CLINIC | Age: 63
End: 2023-02-23
Payer: MEDICAID

## 2023-02-23 ENCOUNTER — RESULT REVIEW (OUTPATIENT)
Age: 63
End: 2023-02-23

## 2023-02-23 VITALS
HEIGHT: 62 IN | WEIGHT: 121.25 LBS | OXYGEN SATURATION: 95 % | SYSTOLIC BLOOD PRESSURE: 117 MMHG | HEART RATE: 76 BPM | DIASTOLIC BLOOD PRESSURE: 69 MMHG | BODY MASS INDEX: 22.31 KG/M2 | RESPIRATION RATE: 18 BRPM | TEMPERATURE: 98.2 F

## 2023-02-23 PROCEDURE — 36902Z: CUSTOM

## 2023-02-23 PROCEDURE — 36907Z: CUSTOM | Mod: 59

## 2023-02-23 RX ORDER — AMOXICILLIN AND CLAVULANATE POTASSIUM 500; 125 MG/1; MG/1
500-125 TABLET, FILM COATED ORAL
Qty: 7 | Refills: 0 | Status: DISCONTINUED | COMMUNITY
Start: 2022-10-27 | End: 2023-02-23

## 2023-02-23 NOTE — HISTORY OF PRESENT ILLNESS
[] : left radiocephalic fistula [FreeTextEntry1] : Dr. Dempsey 9/18/17 [FreeTextEntry4] : Yesterday  [FreeTextEntry5] : Yesterday at 10:30pm  [FreeTextEntry6] : Dr. Koroma

## 2023-02-23 NOTE — ASSESSMENT
[Other: _____] : [unfilled] [FreeTextEntry1] : ESRD on HD with central venous stenosis for fistulogram and possible intervention.\par \par Receurrent stenosis centrally PTA to 14 mm and fistula and axillary veins PTA to 8mm w good result.  EBL=minimal.  Full report to follow.

## 2023-03-01 NOTE — ASSESSMENT
[FreeTextEntry1] : 59 F with PMHX of ESRD 2/2 chronic GN. \par \par #Viral Syndrome:\par - Patient endorsing subjective fever, diarrhea and fatigue, which lasted a couple of days and is now improved. \par - May be secondary to gastroenteritis or COVID (as goes to dialysis center)\par - encouraged hydration and tylenol prn\par - told patient and daughter to see if dialysis center or nephrologist can arrange for her to get her routine blood work today or in Friday's session to look at electrolytes\par - told patient/daughter to call back if symptoms worsen  \par \par Discussed with Dr. Lindsay 
Strong peripheral pulses

## 2023-03-07 ENCOUNTER — OUTPATIENT (OUTPATIENT)
Dept: OUTPATIENT SERVICES | Facility: HOSPITAL | Age: 63
LOS: 1 days | Discharge: HOME | End: 2023-03-07

## 2023-03-07 DIAGNOSIS — T85.898A OTHER SPECIFIED COMPLICATION OF OTHER INTERNAL PROSTHETIC DEVICES, IMPLANTS AND GRAFTS, INITIAL ENCOUNTER: Chronic | ICD-10-CM

## 2023-03-07 DIAGNOSIS — Z99.2 DEPENDENCE ON RENAL DIALYSIS: Chronic | ICD-10-CM

## 2023-03-07 DIAGNOSIS — D45 POLYCYTHEMIA VERA: ICD-10-CM

## 2023-03-07 DIAGNOSIS — I77.0 ARTERIOVENOUS FISTULA, ACQUIRED: Chronic | ICD-10-CM

## 2023-03-08 ENCOUNTER — RESULT REVIEW (OUTPATIENT)
Age: 63
End: 2023-03-08

## 2023-03-08 ENCOUNTER — APPOINTMENT (OUTPATIENT)
Dept: INFUSION THERAPY | Facility: HOSPITAL | Age: 63
End: 2023-03-08

## 2023-03-08 LAB
ANISOCYTOSIS BLD QL: SLIGHT — SIGNIFICANT CHANGE UP
BASOPHILS # BLD AUTO: 0 K/UL — SIGNIFICANT CHANGE UP (ref 0–0.2)
BASOPHILS NFR BLD AUTO: 0 % — SIGNIFICANT CHANGE UP (ref 0–2)
DACRYOCYTES BLD QL SMEAR: SLIGHT — SIGNIFICANT CHANGE UP
ELLIPTOCYTES BLD QL SMEAR: SLIGHT — SIGNIFICANT CHANGE UP
EOSINOPHIL # BLD AUTO: 0.15 K/UL — SIGNIFICANT CHANGE UP (ref 0–0.5)
EOSINOPHIL NFR BLD AUTO: 2 % — SIGNIFICANT CHANGE UP (ref 0–6)
GIANT PLATELETS BLD QL SMEAR: PRESENT — SIGNIFICANT CHANGE UP
HCT VFR BLD CALC: 22.6 % — LOW (ref 34.5–45)
HGB BLD-MCNC: 6.9 G/DL — CRITICAL LOW (ref 11.5–15.5)
HYPOSEGMENTATION: PRESENT — SIGNIFICANT CHANGE UP
LYMPHOCYTES # BLD AUTO: 0.69 K/UL — LOW (ref 1–3.3)
LYMPHOCYTES # BLD AUTO: 9 % — LOW (ref 13–44)
MCHC RBC-ENTMCNC: 31 PG — SIGNIFICANT CHANGE UP (ref 27–34)
MCHC RBC-ENTMCNC: 31.4 G/DL — LOW (ref 32–36)
MCV RBC AUTO: 98.7 FL — SIGNIFICANT CHANGE UP (ref 80–100)
MONOCYTES # BLD AUTO: 0.38 K/UL — SIGNIFICANT CHANGE UP (ref 0–0.9)
MONOCYTES NFR BLD AUTO: 5 % — SIGNIFICANT CHANGE UP (ref 2–14)
MYELOCYTES NFR BLD: 3 % — HIGH (ref 0–0)
NEUTROPHILS # BLD AUTO: 6.22 K/UL — SIGNIFICANT CHANGE UP (ref 1.8–7.4)
NEUTROPHILS NFR BLD AUTO: 81 % — HIGH (ref 43–77)
NRBC # BLD: 3 /100 — HIGH (ref 0–0)
NRBC # BLD: SIGNIFICANT CHANGE UP /100 WBCS (ref 0–0)
PLAT MORPH BLD: NORMAL — SIGNIFICANT CHANGE UP
PLATELET # BLD AUTO: 132 K/UL — LOW (ref 150–400)
POIKILOCYTOSIS BLD QL AUTO: SLIGHT — SIGNIFICANT CHANGE UP
RBC # BLD: 2.29 M/UL — LOW (ref 3.8–5.2)
RBC # FLD: 20 % — HIGH (ref 10.3–14.5)
RBC BLD AUTO: ABNORMAL
SCHISTOCYTES BLD QL AUTO: SLIGHT — SIGNIFICANT CHANGE UP
WBC # BLD: 7.68 K/UL — SIGNIFICANT CHANGE UP (ref 3.8–10.5)
WBC # FLD AUTO: 7.68 K/UL — SIGNIFICANT CHANGE UP (ref 3.8–10.5)

## 2023-03-09 ENCOUNTER — APPOINTMENT (OUTPATIENT)
Dept: INFUSION THERAPY | Facility: HOSPITAL | Age: 63
End: 2023-03-09

## 2023-03-09 ENCOUNTER — OUTPATIENT (OUTPATIENT)
Dept: OUTPATIENT SERVICES | Facility: HOSPITAL | Age: 63
LOS: 1 days | End: 2023-03-09

## 2023-03-09 DIAGNOSIS — D45 POLYCYTHEMIA VERA: ICD-10-CM

## 2023-03-09 DIAGNOSIS — N18.6 END STAGE RENAL DISEASE: ICD-10-CM

## 2023-03-09 DIAGNOSIS — T85.898A OTHER SPECIFIED COMPLICATION OF OTHER INTERNAL PROSTHETIC DEVICES, IMPLANTS AND GRAFTS, INITIAL ENCOUNTER: Chronic | ICD-10-CM

## 2023-03-09 DIAGNOSIS — D75.81 MYELOFIBROSIS: ICD-10-CM

## 2023-03-09 DIAGNOSIS — D64.9 ANEMIA, UNSPECIFIED: ICD-10-CM

## 2023-03-09 DIAGNOSIS — I77.0 ARTERIOVENOUS FISTULA, ACQUIRED: Chronic | ICD-10-CM

## 2023-03-09 DIAGNOSIS — Z99.2 DEPENDENCE ON RENAL DIALYSIS: Chronic | ICD-10-CM

## 2023-03-09 DIAGNOSIS — D75.84: ICD-10-CM

## 2023-03-09 PROCEDURE — 86850 RBC ANTIBODY SCREEN: CPT

## 2023-03-09 PROCEDURE — 86922 COMPATIBILITY TEST ANTIGLOB: CPT

## 2023-03-09 PROCEDURE — 86902 BLOOD TYPE ANTIGEN DONOR EA: CPT

## 2023-03-09 PROCEDURE — 86901 BLOOD TYPING SEROLOGIC RH(D): CPT

## 2023-03-09 PROCEDURE — 86880 COOMBS TEST DIRECT: CPT

## 2023-03-09 PROCEDURE — 86900 BLOOD TYPING SEROLOGIC ABO: CPT

## 2023-03-13 ENCOUNTER — LABORATORY RESULT (OUTPATIENT)
Age: 63
End: 2023-03-13

## 2023-03-15 ENCOUNTER — NON-APPOINTMENT (OUTPATIENT)
Age: 63
End: 2023-03-15

## 2023-03-15 NOTE — PATIENT PROFILE ADULT. - NS PRO OT REFERRAL QUES 2 YN
Pt in bed resting c/o of increased SOB O2 sat is 89% MD notified. Pt placed on air vo 50/50 O2 sat is 92%. abd is soft bowel sounds are active. Dressings to BLE in place. Denies pain at current time. Perm cath in place dressing clean dry and intact. Safety measures in place will continue to monitor. I have personally seen and examined the patient. I have collaborated with and supervised the no

## 2023-04-10 ENCOUNTER — LABORATORY RESULT (OUTPATIENT)
Age: 63
End: 2023-04-10

## 2023-04-18 ENCOUNTER — NON-APPOINTMENT (OUTPATIENT)
Age: 63
End: 2023-04-18

## 2023-04-19 ENCOUNTER — RESULT REVIEW (OUTPATIENT)
Age: 63
End: 2023-04-19

## 2023-04-19 ENCOUNTER — APPOINTMENT (OUTPATIENT)
Dept: HEMATOLOGY ONCOLOGY | Facility: CLINIC | Age: 63
End: 2023-04-19

## 2023-04-19 ENCOUNTER — OUTPATIENT (OUTPATIENT)
Dept: OUTPATIENT SERVICES | Facility: HOSPITAL | Age: 63
LOS: 1 days | End: 2023-04-19
Payer: MEDICAID

## 2023-04-19 DIAGNOSIS — T85.898A OTHER SPECIFIED COMPLICATION OF OTHER INTERNAL PROSTHETIC DEVICES, IMPLANTS AND GRAFTS, INITIAL ENCOUNTER: Chronic | ICD-10-CM

## 2023-04-19 DIAGNOSIS — Z99.2 DEPENDENCE ON RENAL DIALYSIS: Chronic | ICD-10-CM

## 2023-04-19 DIAGNOSIS — D75.81 MYELOFIBROSIS: ICD-10-CM

## 2023-04-19 DIAGNOSIS — I77.0 ARTERIOVENOUS FISTULA, ACQUIRED: Chronic | ICD-10-CM

## 2023-04-19 LAB
ANISOCYTOSIS BLD QL: SLIGHT — SIGNIFICANT CHANGE UP
BASOPHILS # BLD AUTO: 0.05 K/UL — SIGNIFICANT CHANGE UP (ref 0–0.2)
BASOPHILS NFR BLD AUTO: 1 % — SIGNIFICANT CHANGE UP (ref 0–2)
DACRYOCYTES BLD QL SMEAR: SLIGHT — SIGNIFICANT CHANGE UP
ELLIPTOCYTES BLD QL SMEAR: SLIGHT — SIGNIFICANT CHANGE UP
EOSINOPHIL # BLD AUTO: 0.32 K/UL — SIGNIFICANT CHANGE UP (ref 0–0.5)
EOSINOPHIL NFR BLD AUTO: 6 % — SIGNIFICANT CHANGE UP (ref 0–6)
HCT VFR BLD CALC: 24.6 % — LOW (ref 34.5–45)
HGB BLD-MCNC: 7.5 G/DL — LOW (ref 11.5–15.5)
LYMPHOCYTES # BLD AUTO: 0.81 K/UL — LOW (ref 1–3.3)
LYMPHOCYTES # BLD AUTO: 15 % — SIGNIFICANT CHANGE UP (ref 13–44)
MACROCYTES BLD QL: SLIGHT — SIGNIFICANT CHANGE UP
MCHC RBC-ENTMCNC: 30.6 G/DL — LOW (ref 32–36)
MCHC RBC-ENTMCNC: 31.9 PG — SIGNIFICANT CHANGE UP (ref 27–34)
MCV RBC AUTO: 104.2 FL — HIGH (ref 80–100)
METAMYELOCYTES # FLD: 1 % — HIGH (ref 0–0)
MONOCYTES # BLD AUTO: 0.27 K/UL — SIGNIFICANT CHANGE UP (ref 0–0.9)
MONOCYTES NFR BLD AUTO: 5 % — SIGNIFICANT CHANGE UP (ref 2–14)
MYELOCYTES NFR BLD: 3 % — HIGH (ref 0–0)
NEUTROPHILS # BLD AUTO: 3.73 K/UL — SIGNIFICANT CHANGE UP (ref 1.8–7.4)
NEUTROPHILS NFR BLD AUTO: 69 % — SIGNIFICANT CHANGE UP (ref 43–77)
NRBC # BLD: 4 /100 — HIGH (ref 0–0)
NRBC # BLD: SIGNIFICANT CHANGE UP /100 WBCS (ref 0–0)
PLAT MORPH BLD: NORMAL — SIGNIFICANT CHANGE UP
PLATELET # BLD AUTO: 100 K/UL — LOW (ref 150–400)
POIKILOCYTOSIS BLD QL AUTO: SLIGHT — SIGNIFICANT CHANGE UP
POLYCHROMASIA BLD QL SMEAR: SLIGHT — SIGNIFICANT CHANGE UP
RBC # BLD: 2.38 M/UL — LOW (ref 3.8–5.2)
RBC # FLD: 19.6 % — HIGH (ref 10.3–14.5)
RBC BLD AUTO: ABNORMAL
RETICS #: 94.2 K/UL — SIGNIFICANT CHANGE UP (ref 25–125)
RETICS/RBC NFR: 4 % — HIGH (ref 0.5–2.5)
SCHISTOCYTES BLD QL AUTO: SLIGHT — SIGNIFICANT CHANGE UP
WBC # BLD: 5.41 K/UL — SIGNIFICANT CHANGE UP (ref 3.8–10.5)
WBC # FLD AUTO: 5.41 K/UL — SIGNIFICANT CHANGE UP (ref 3.8–10.5)

## 2023-04-20 ENCOUNTER — RESULT REVIEW (OUTPATIENT)
Age: 63
End: 2023-04-20

## 2023-04-20 ENCOUNTER — APPOINTMENT (OUTPATIENT)
Dept: HEMATOLOGY ONCOLOGY | Facility: CLINIC | Age: 63
End: 2023-04-20
Payer: COMMERCIAL

## 2023-04-20 VITALS
HEART RATE: 74 BPM | OXYGEN SATURATION: 99 % | WEIGHT: 126.08 LBS | BODY MASS INDEX: 23.06 KG/M2 | TEMPERATURE: 96.8 F | SYSTOLIC BLOOD PRESSURE: 125 MMHG | RESPIRATION RATE: 16 BRPM | DIASTOLIC BLOOD PRESSURE: 78 MMHG

## 2023-04-20 PROCEDURE — 99214 OFFICE O/P EST MOD 30 MIN: CPT

## 2023-04-20 NOTE — ED ADULT NURSE NOTE - CINV DISCH TEACH PARTICIP
Additional Comments: 4/20/2023 NDC- [address]-83 Additional Comments: 4/20/2023 NDC- [address]-10 Patient

## 2023-04-22 ENCOUNTER — APPOINTMENT (OUTPATIENT)
Dept: INFUSION THERAPY | Facility: HOSPITAL | Age: 63
End: 2023-04-22

## 2023-04-22 PROCEDURE — 86900 BLOOD TYPING SEROLOGIC ABO: CPT

## 2023-04-22 PROCEDURE — 86902 BLOOD TYPE ANTIGEN DONOR EA: CPT

## 2023-04-22 PROCEDURE — 86880 COOMBS TEST DIRECT: CPT

## 2023-04-22 PROCEDURE — 86901 BLOOD TYPING SEROLOGIC RH(D): CPT

## 2023-04-22 PROCEDURE — 86922 COMPATIBILITY TEST ANTIGLOB: CPT

## 2023-04-22 PROCEDURE — 86850 RBC ANTIBODY SCREEN: CPT

## 2023-04-22 NOTE — HISTORY OF PRESENT ILLNESS
[de-identified] : This is a 61 year old woman with a history of MAK 2 + polycythemia vera.   diagnosed in 2012. 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 Hollie Guzmán in [place] (# [phone]). She has had a bone marrow biopsy done at the time of diagnosis.  Was previously non complaint with therapy there, however has been quite complaint with the hydroxyurea since coming to [facility] fellows clinic here in 2017.  Patient now follows with Dr. Jacky Guo after closure of the fellows clinic.  \par \par Abdominal US done May 2017 showed: attenuated main splenic vein indicative of previous/chronic thrombosis with surrounding patent varicosities; Portal venous system is patent with hepatopedal blood flow; Patent hepatic veins.\par \par She was admitted to [facility] from July 3-5, 2018 due to hyperkalemia and thrombosis of her AVF. Hematology was consulted and recommended phlebotomy, but patient refused. She was discharged on Hydrea 500mg on HD days.  which she is still on. As of 9/27/19 she has transferred from the hematology fellows clinic to my service at [facility].  \par \par After July admission, Patient was transitioned to Jakafi 10mg, due to anemia.  Since then patient was started on danazol which succeeded in decreasing transfusion requirements.  Last transfusion took place on 12/30/22.  Patient otherwise feels well and has no complaints.  \par  [de-identified] : \par Intepreter Johana [id number]\par Patient went 3 months between January and March without needinga transfusion.  for a while.  THisis a bout the 4th time in the last 3 years where she has gone through these episodes where her Hg drops sheneeds multiple transfusions then does not require them again for 3 months.  \par \par Patient does have significant iron overload feritn 1674.  We had been  tryign to limit transfusions and during the good seasons he does not need one.  It appears that the transfusional season just restated, Hg 7.0 this past week we were notified by nephrology.  \par \par Hg 7.5g/dl today, she has appointment on Saturday 4/22/23.   Nigel does feel some dizziness this time.  \par \par There were no peripharl immature cells on automated CBC today nad none for a long time. Will review smear again.  \par \par She remains on th danoozole 3 tablets aday.  all at once.  \par \par Rogerio has the INDER support reliably and proper doses of the danozole for almost a year now.  The modifiable actuion right now is the change the dose of the Jakafi.  Unclear if we should be goign down or up on this as the medication itself aslo causes an anemia just like the myelofibrosis dose. She does have shome sharp pain in the upper and the right and left quadrants itnermattantly, palable hepatomegally.  Suggested we start by going up on the Jakafi first, if this is a problem we can go back down t o10mg after dialysis.  \par B12 1189 folic acid >20  Paitnet on dialysis type vitamins already which this should be included in.

## 2023-04-22 NOTE — ASSESSMENT
[FreeTextEntry1] : This is a 62 woman with a history of Polycythemia Vera, MAK 2 + since 2012. 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  She is now under my super vision for the P. Vera. \par Jakafi was initially started at 15mg TIW.  Dose was decreased to 10mg TIW due to anemia though that was likely form the lack of danazol. 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 Would continue this.  \par Since then patient continued to have some abdominal complaints.   though HG did increase., Danazol was increased to 600mg and Jakafi increased back up to 15mg3 times a week last October.  Patient is periodically anemic every 3 months or so requiring transfusions.  Confirmed complaisance with the Danazol 600mg daily this time.  WIll increase Jakafi to 20mg 3 times a week with dialysis.  Explained that it is very hard to tell the anemia that comes from the myelofibrosis apart for  the anemia cause d by the Jakafi.  The increase to Jakafi 20mg may help with her abdominal pain from hepatosplenomegaly, and may actually help improve the anemia.  \par \par Continue INDER support with dialysis.

## 2023-04-22 NOTE — HISTORY OF PRESENT ILLNESS
[de-identified] : This is a 61 year old woman with a history of MAK 2 + polycythemia vera.   diagnosed in 2012. 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 oHllie Guzmán in [place] (# [phone]). She has had a bone marrow biopsy done at the time of diagnosis.  Was previously non complaint with therapy there, however has been quite complaint with the hydroxyurea since coming to [facility] fellows clinic here in 2017.  Patient now follows with Dr. Jacky Guo after closure of the fellows clinic.  \par \par Abdominal US done May 2017 showed: attenuated main splenic vein indicative of previous/chronic thrombosis with surrounding patent varicosities; Portal venous system is patent with hepatopedal blood flow; Patent hepatic veins.\par \par She was admitted to [facility] from July 3-5, 2018 due to hyperkalemia and thrombosis of her AVF. Hematology was consulted and recommended phlebotomy, but patient refused. She was discharged on Hydrea 500mg on HD days.  which she is still on. As of 9/27/19 she has transferred from the hematology fellows clinic to my service at [facility].  \par \par After July admission, Patient was transitioned to Jakafi 10mg, due to anemia.  Since then patient was started on danazol which succeeded in decreasing transfusion requirements.  Last transfusion took place on 12/30/22.  Patient otherwise feels well and has no complaints.  \par  [de-identified] : \par Intepreter Johana [id number]\par Patient went 3 months between January and March without needinga transfusion.  for a while.  THisis a bout the 4th time in the last 3 years where she has gone through these episodes where her Hg drops sheneeds multiple transfusions then does not require them again for 3 months.  \par \par Patient does have significant iron overload feritn 1674.  We had been  tryign to limit transfusions and during the good seasons he does not need one.  It appears that the transfusional season just restated, Hg 7.0 this past week we were notified by nephrology.  \par \par Hg 7.5g/dl today, she has appointment on Saturday 4/22/23.   Nigel does feel some dizziness this time.  \par \par There were no peripharl immature cells on automated CBC today nad none for a long time. Will review smear again.  \par \par She remains on th danoozole 3 tablets aday.  all at once.  \par \par Rogerio has the INDER support reliably and proper doses of the danozole for almost a year now.  The modifiable actuion right now is the change the dose of the Jakafi.  Unclear if we should be goign down or up on this as the medication itself aslo causes an anemia just like the myelofibrosis dose. She does have shome sharp pain in the upper and the right and left quadrants itnermattantly, palable hepatomegally.  Suggested we start by going up on the Jakafi first, if this is a problem we can go back down t o10mg after dialysis.  \par B12 1189 folic acid >20  Paitnet on dialysis type vitamins already which this should be included in.

## 2023-04-25 DIAGNOSIS — R11.2 NAUSEA WITH VOMITING, UNSPECIFIED: ICD-10-CM

## 2023-04-25 DIAGNOSIS — N18.6 END STAGE RENAL DISEASE: ICD-10-CM

## 2023-04-25 DIAGNOSIS — Z51.89 ENCOUNTER FOR OTHER SPECIFIED AFTERCARE: ICD-10-CM

## 2023-04-25 DIAGNOSIS — D64.9 ANEMIA, UNSPECIFIED: ICD-10-CM

## 2023-04-26 ENCOUNTER — NON-APPOINTMENT (OUTPATIENT)
Age: 63
End: 2023-04-26

## 2023-04-26 LAB
ALBUMIN SERPL ELPH-MCNC: 4.4 G/DL
ALP BLD-CCNC: 72 U/L
ALT SERPL-CCNC: 24 U/L
ANION GAP SERPL CALC-SCNC: 17 MMOL/L
AST SERPL-CCNC: 24 U/L
BILIRUB SERPL-MCNC: 1.8 MG/DL
BUN SERPL-MCNC: 38 MG/DL
CALCIUM SERPL-MCNC: 9 MG/DL
CHLORIDE SERPL-SCNC: 98 MMOL/L
CO2 SERPL-SCNC: 23 MMOL/L
CREAT SERPL-MCNC: 8.06 MG/DL
EGFR: 5 ML/MIN/1.73M2
FERRITIN SERPL-MCNC: 1674 NG/ML
FOLATE SERPL-MCNC: >20 NG/ML
GLUCOSE SERPL-MCNC: 149 MG/DL
HAPTOGLOB SERPL-MCNC: 27 MG/DL
IRON SATN MFR SERPL: 48 %
IRON SERPL-MCNC: 107 UG/DL
LDH SERPL-CCNC: 348 U/L
POTASSIUM SERPL-SCNC: 4.6 MMOL/L
PROT SERPL-MCNC: 7.2 G/DL
SODIUM SERPL-SCNC: 138 MMOL/L
TIBC SERPL-MCNC: 226 UG/DL
UIBC SERPL-MCNC: 118 UG/DL
VIT B12 SERPL-MCNC: 1189 PG/ML

## 2023-04-27 ENCOUNTER — OUTPATIENT (OUTPATIENT)
Dept: OUTPATIENT SERVICES | Facility: HOSPITAL | Age: 63
LOS: 1 days | End: 2023-04-27
Payer: SELF-PAY

## 2023-04-27 ENCOUNTER — APPOINTMENT (OUTPATIENT)
Dept: INTERNAL MEDICINE | Facility: CLINIC | Age: 63
End: 2023-04-27
Payer: COMMERCIAL

## 2023-04-27 ENCOUNTER — APPOINTMENT (OUTPATIENT)
Dept: INTERNAL MEDICINE | Facility: CLINIC | Age: 63
End: 2023-04-27

## 2023-04-27 VITALS
WEIGHT: 123 LBS | HEIGHT: 62 IN | OXYGEN SATURATION: 98 % | SYSTOLIC BLOOD PRESSURE: 100 MMHG | DIASTOLIC BLOOD PRESSURE: 70 MMHG | BODY MASS INDEX: 22.63 KG/M2 | HEART RATE: 71 BPM

## 2023-04-27 DIAGNOSIS — F32.A DEPRESSION, UNSPECIFIED: ICD-10-CM

## 2023-04-27 DIAGNOSIS — T85.898A OTHER SPECIFIED COMPLICATION OF OTHER INTERNAL PROSTHETIC DEVICES, IMPLANTS AND GRAFTS, INITIAL ENCOUNTER: Chronic | ICD-10-CM

## 2023-04-27 DIAGNOSIS — I10 ESSENTIAL (PRIMARY) HYPERTENSION: ICD-10-CM

## 2023-04-27 DIAGNOSIS — Z23 ENCOUNTER FOR IMMUNIZATION: ICD-10-CM

## 2023-04-27 DIAGNOSIS — Z99.2 DEPENDENCE ON RENAL DIALYSIS: Chronic | ICD-10-CM

## 2023-04-27 DIAGNOSIS — I77.0 ARTERIOVENOUS FISTULA, ACQUIRED: Chronic | ICD-10-CM

## 2023-04-27 PROCEDURE — G0463: CPT | Mod: 25

## 2023-04-27 PROCEDURE — 90471 IMMUNIZATION ADMIN: CPT

## 2023-04-27 PROCEDURE — ZZZZZ: CPT | Mod: GE

## 2023-04-27 PROCEDURE — 90750 HZV VACC RECOMBINANT IM: CPT

## 2023-04-27 RX ORDER — DANAZOL 200 MG/1
200 CAPSULE ORAL 3 TIMES DAILY
Qty: 90 | Refills: 3 | Status: DISCONTINUED | COMMUNITY
Start: 2021-09-23 | End: 2023-04-27

## 2023-04-28 PROBLEM — F32.A DEPRESSION: Status: ACTIVE | Noted: 2018-03-30

## 2023-04-28 NOTE — HEALTH RISK ASSESSMENT
[No] : No [1] : 2) Feeling down, depressed, or hopeless for several days (1) [PHQ-2 Positive] : PHQ-2 Positive [Nearly Every Day (3)] : 4.) Feeling tired or having little energy? Nearly every day [Several Days (1)] : 5.) Poor appetite or overeating? Several days [Not at All (0)] : 8.) Moving or speaking so slowly that other people could have noticed, or the opposite, moving or speaking faster than usual? Not at all [Mild] : severity of depression is mild [Not at all] : How difficult have these problems made it for you to do your work, take care of things at home, or get along with people? Not at all [PHQ-9 Negative - No further assessment needed] : PHQ-9 Negative - No further assessment needed [Never] : Never [[id number]] : 2 [[id number]] : 6

## 2023-04-28 NOTE — PHYSICAL EXAM
[No Acute Distress] : no acute distress [Well Nourished] : well nourished [Well Developed] : well developed [Well-Appearing] : well-appearing [Normal Sclera/Conjunctiva] : normal sclera/conjunctiva [Normal Outer Ear/Nose] : the outer ears and nose were normal in appearance [No JVD] : no jugular venous distention [Normal] : normal rate, regular rhythm, normal S1 and S2 and no murmur heard [Soft] : abdomen soft [Non Tender] : non-tender [Normal Bowel Sounds] : normal bowel sounds [No Rash] : no rash [Normal Gait] : normal gait [Normal Affect] : the affect was normal [Normal Insight/Judgement] : insight and judgment were intact [de-identified] : M

## 2023-04-28 NOTE — HISTORY OF PRESENT ILLNESS
Pulmonology [FreeTextEntry1] : Follow up [de-identified] : 58 woman with a history of Polycythemia Vera, MAK 2 + since 2012 c/bhistory of splenic vein thrombosis w/ gastric varices (2015), now MDS, ESRD on HD (Tu/Th/Sat) via LUE AVF (2/2 chronic GN, started Dec 2017) formerly on peritoneal dialysis, HTN, acute cholecystitis s/p ERCP c/b pancreatitis & cholecystectomy who presents for follow up.\par \par #MDS- She continues to have follow up with hematology. She is on Danazol 200mg tid and Jakafi 20mg 3 times a week with dialysis. She intermittently receives transfusion with hematology. \par \par #Hypotension- She reports that she has asymptomatic after dialysis and this concerns her. She reports numbers of 80s systolic following HD but otherwise they have been around 110s-120s. She is prescribed coreg 3.125mg bid which she has been taking only daily. She is planning to stop the medication after she runs out. \par \par #Fatigue- She continues to have some fatigue daily regardless of good sleep. She has learned how to deal with this.\par \par Otherwise, she is in her normal state of health. She reports things are going well with her dialysis and she states that her mood is good. She continues to eat a English diet and is active around the house but does not exercise.

## 2023-04-28 NOTE — INTERPRETER SERVICES
[[employer] ] : provided by [employer]   [Interpreters_IDNumber] : [id number] [Interpreters_FullName] : Young [TWNoteComboBox1] : Nepali

## 2023-04-28 NOTE — ASSESSMENT
[FreeTextEntry1] : 58 woman with a history of Polycythemia Vera, MAK 2 + since 2012 c/bhistory of splenic vein thrombosis w/ gastric varices (2015), now myelofibrosis, ESRD on HD (Tu/Th/Sat) via LUE AVF (2/2 chronic GN, started Dec 2017) formerly on peritoneal dialysis, HTN, acute cholecystitis s/p ERCP c/b pancreatitis & cholecystectomy who presents for follow up.\par \par #Myelofibrosis\par - Continue Danazol\par - Continue Jakafi\par - Hematology follow up as recommended \par \par #Hypotension/Hypertension\par - Encouraged patient to continue coreg, as was prescribed for gastric varices\par - Can hold on HD days if feels more comfortable\par - Coreg refilled\par \par #Splenic vein thrombosis w/ gastric varices\par - GI follow up\par \par #GERD\par - Refilled famotidine\par \par #HCM\par - Shingrix dose 1 given\par - Medications reviewed, reconciled, renewed\par - Patient to schedule mammogram, hasn't done it yet\par - Healthy diet & exercise discussed\par - RTC 3 months for follow up or prn\par \par Case discussed with Dr. Santiago \par \par Michael Calderón MD\par PGY-2, [facility]

## 2023-05-01 DIAGNOSIS — D45 POLYCYTHEMIA VERA: ICD-10-CM

## 2023-05-01 DIAGNOSIS — F32.A DEPRESSION, UNSPECIFIED: ICD-10-CM

## 2023-05-01 DIAGNOSIS — I86.4 GASTRIC VARICES: ICD-10-CM

## 2023-05-01 DIAGNOSIS — Z23 ENCOUNTER FOR IMMUNIZATION: ICD-10-CM

## 2023-05-01 DIAGNOSIS — D75.81 MYELOFIBROSIS: ICD-10-CM

## 2023-05-01 DIAGNOSIS — N18.6 END STAGE RENAL DISEASE: ICD-10-CM

## 2023-05-01 NOTE — CHART NOTE - NSCHARTNOTEFT_GEN_A_CORE
58 yo F with ESRD on HD via LUE AVF found to be thrombosed on 11/24/2020. IR consulted from declot. At this time we recommend that vascular surgery be consulted for a non-tunnelled HD catheter placement if urgent HD is needed this weekend for medical optimization. In addition please obtain a LUE US. Case will be reviewed on Monday, 1/27/20, for possible declot.    Thank you for the courtesy of this consult, we will continue to follow.    Case discussed with attending. Intermediate Repair And Graft Additional Text (Will Appearing After The Standard Complex Repair Text): The intermediate repair was not sufficient to completely close the primary defect. The remaining additional defect was repaired with the graft mentioned below.

## 2023-05-05 ENCOUNTER — NON-APPOINTMENT (OUTPATIENT)
Age: 63
End: 2023-05-05

## 2023-05-08 ENCOUNTER — LABORATORY RESULT (OUTPATIENT)
Age: 63
End: 2023-05-08

## 2023-05-11 ENCOUNTER — NON-APPOINTMENT (OUTPATIENT)
Age: 63
End: 2023-05-11

## 2023-05-16 ENCOUNTER — EMERGENCY (EMERGENCY)
Facility: HOSPITAL | Age: 63
LOS: 1 days | Discharge: HOME | End: 2023-05-16
Attending: STUDENT IN AN ORGANIZED HEALTH CARE EDUCATION/TRAINING PROGRAM
Payer: MEDICAID

## 2023-05-16 VITALS
HEART RATE: 71 BPM | SYSTOLIC BLOOD PRESSURE: 152 MMHG | HEIGHT: 62 IN | TEMPERATURE: 98 F | DIASTOLIC BLOOD PRESSURE: 81 MMHG | RESPIRATION RATE: 18 BRPM | OXYGEN SATURATION: 100 % | WEIGHT: 123.46 LBS

## 2023-05-16 VITALS
OXYGEN SATURATION: 96 % | SYSTOLIC BLOOD PRESSURE: 127 MMHG | RESPIRATION RATE: 16 BRPM | TEMPERATURE: 99 F | DIASTOLIC BLOOD PRESSURE: 72 MMHG | HEART RATE: 73 BPM

## 2023-05-16 DIAGNOSIS — I77.0 ARTERIOVENOUS FISTULA, ACQUIRED: Chronic | ICD-10-CM

## 2023-05-16 DIAGNOSIS — Z99.2 DEPENDENCE ON RENAL DIALYSIS: Chronic | ICD-10-CM

## 2023-05-16 DIAGNOSIS — T85.898A OTHER SPECIFIED COMPLICATION OF OTHER INTERNAL PROSTHETIC DEVICES, IMPLANTS AND GRAFTS, INITIAL ENCOUNTER: Chronic | ICD-10-CM

## 2023-05-16 LAB
ALBUMIN SERPL ELPH-MCNC: 4 G/DL — SIGNIFICANT CHANGE UP (ref 3.3–5)
ALP SERPL-CCNC: 73 U/L — SIGNIFICANT CHANGE UP (ref 40–120)
ALT FLD-CCNC: 14 U/L — SIGNIFICANT CHANGE UP (ref 10–45)
ANION GAP SERPL CALC-SCNC: 14 MMOL/L — SIGNIFICANT CHANGE UP (ref 5–17)
ANISOCYTOSIS BLD QL: SLIGHT — SIGNIFICANT CHANGE UP
AST SERPL-CCNC: 20 U/L — SIGNIFICANT CHANGE UP (ref 10–40)
BASE EXCESS BLDV CALC-SCNC: 5.8 MMOL/L — HIGH (ref -2–3)
BASOPHILS # BLD AUTO: 0.14 K/UL — SIGNIFICANT CHANGE UP (ref 0–0.2)
BASOPHILS NFR BLD AUTO: 1.7 % — SIGNIFICANT CHANGE UP (ref 0–2)
BILIRUB SERPL-MCNC: 1.3 MG/DL — HIGH (ref 0.2–1.2)
BUN SERPL-MCNC: 23 MG/DL — SIGNIFICANT CHANGE UP (ref 7–23)
BURR CELLS BLD QL SMEAR: PRESENT — SIGNIFICANT CHANGE UP
CA-I SERPL-SCNC: 1.09 MMOL/L — LOW (ref 1.15–1.33)
CALCIUM SERPL-MCNC: 8.5 MG/DL — SIGNIFICANT CHANGE UP (ref 8.4–10.5)
CHLORIDE BLDV-SCNC: 100 MMOL/L — SIGNIFICANT CHANGE UP (ref 96–108)
CHLORIDE SERPL-SCNC: 97 MMOL/L — SIGNIFICANT CHANGE UP (ref 96–108)
CO2 BLDV-SCNC: 32 MMOL/L — HIGH (ref 22–26)
CO2 SERPL-SCNC: 25 MMOL/L — SIGNIFICANT CHANGE UP (ref 22–31)
CREAT SERPL-MCNC: 4.88 MG/DL — HIGH (ref 0.5–1.3)
DACRYOCYTES BLD QL SMEAR: SLIGHT — SIGNIFICANT CHANGE UP
EGFR: 10 ML/MIN/1.73M2 — LOW
ELLIPTOCYTES BLD QL SMEAR: SLIGHT — SIGNIFICANT CHANGE UP
EOSINOPHIL # BLD AUTO: 0.14 K/UL — SIGNIFICANT CHANGE UP (ref 0–0.5)
EOSINOPHIL NFR BLD AUTO: 1.7 % — SIGNIFICANT CHANGE UP (ref 0–6)
GAS PNL BLDV: 136 MMOL/L — SIGNIFICANT CHANGE UP (ref 136–145)
GAS PNL BLDV: SIGNIFICANT CHANGE UP
GLUCOSE BLDV-MCNC: 96 MG/DL — SIGNIFICANT CHANGE UP (ref 70–99)
GLUCOSE SERPL-MCNC: 103 MG/DL — HIGH (ref 70–99)
HCO3 BLDV-SCNC: 31 MMOL/L — HIGH (ref 22–29)
HCT VFR BLD CALC: 29 % — LOW (ref 34.5–45)
HCT VFR BLDA CALC: 26 % — LOW (ref 34.5–46.5)
HGB BLD CALC-MCNC: 8.8 G/DL — LOW (ref 11.7–16.1)
HGB BLD-MCNC: 8.7 G/DL — LOW (ref 11.5–15.5)
LACTATE BLDV-MCNC: 0.7 MMOL/L — SIGNIFICANT CHANGE UP (ref 0.5–2)
LIDOCAIN IGE QN: 67 U/L — HIGH (ref 7–60)
LYMPHOCYTES # BLD AUTO: 1.07 K/UL — SIGNIFICANT CHANGE UP (ref 1–3.3)
LYMPHOCYTES # BLD AUTO: 13.1 % — SIGNIFICANT CHANGE UP (ref 13–44)
MACROCYTES BLD QL: SLIGHT — SIGNIFICANT CHANGE UP
MANUAL SMEAR VERIFICATION: SIGNIFICANT CHANGE UP
MCHC RBC-ENTMCNC: 30 GM/DL — LOW (ref 32–36)
MCHC RBC-ENTMCNC: 31.1 PG — SIGNIFICANT CHANGE UP (ref 27–34)
MCV RBC AUTO: 103.6 FL — HIGH (ref 80–100)
MONOCYTES # BLD AUTO: 0.71 K/UL — SIGNIFICANT CHANGE UP (ref 0–0.9)
MONOCYTES NFR BLD AUTO: 8.7 % — SIGNIFICANT CHANGE UP (ref 2–14)
MYELOCYTES NFR BLD: 2.6 % — HIGH (ref 0–0)
NEUTROPHILS # BLD AUTO: 5.92 K/UL — SIGNIFICANT CHANGE UP (ref 1.8–7.4)
NEUTROPHILS NFR BLD AUTO: 71.3 % — SIGNIFICANT CHANGE UP (ref 43–77)
NEUTS BAND # BLD: 0.9 % — SIGNIFICANT CHANGE UP (ref 0–8)
NRBC # BLD: 1 /100 — HIGH (ref 0–0)
OVALOCYTES BLD QL SMEAR: SLIGHT — SIGNIFICANT CHANGE UP
PCO2 BLDV: 45 MMHG — HIGH (ref 39–42)
PH BLDV: 7.44 — HIGH (ref 7.32–7.43)
PLAT MORPH BLD: NORMAL — SIGNIFICANT CHANGE UP
PLATELET # BLD AUTO: 144 K/UL — LOW (ref 150–400)
PO2 BLDV: 49 MMHG — HIGH (ref 25–45)
POIKILOCYTOSIS BLD QL AUTO: SIGNIFICANT CHANGE UP
POLYCHROMASIA BLD QL SMEAR: SLIGHT — SIGNIFICANT CHANGE UP
POTASSIUM BLDV-SCNC: 4.7 MMOL/L — SIGNIFICANT CHANGE UP (ref 3.5–5.1)
POTASSIUM SERPL-MCNC: 4.7 MMOL/L — SIGNIFICANT CHANGE UP (ref 3.5–5.3)
POTASSIUM SERPL-SCNC: 4.7 MMOL/L — SIGNIFICANT CHANGE UP (ref 3.5–5.3)
PROT SERPL-MCNC: 7.5 G/DL — SIGNIFICANT CHANGE UP (ref 6–8.3)
RBC # BLD: 2.8 M/UL — LOW (ref 3.8–5.2)
RBC # FLD: 18.4 % — HIGH (ref 10.3–14.5)
RBC BLD AUTO: ABNORMAL
SAO2 % BLDV: 77.2 % — SIGNIFICANT CHANGE UP (ref 67–88)
SCHISTOCYTES BLD QL AUTO: SLIGHT — SIGNIFICANT CHANGE UP
SODIUM SERPL-SCNC: 136 MMOL/L — SIGNIFICANT CHANGE UP (ref 135–145)
WBC # BLD: 8.2 K/UL — SIGNIFICANT CHANGE UP (ref 3.8–10.5)
WBC # FLD AUTO: 8.2 K/UL — SIGNIFICANT CHANGE UP (ref 3.8–10.5)

## 2023-05-16 PROCEDURE — 85025 COMPLETE CBC W/AUTO DIFF WBC: CPT

## 2023-05-16 PROCEDURE — 71045 X-RAY EXAM CHEST 1 VIEW: CPT | Mod: 26

## 2023-05-16 PROCEDURE — 82947 ASSAY GLUCOSE BLOOD QUANT: CPT

## 2023-05-16 PROCEDURE — 85014 HEMATOCRIT: CPT

## 2023-05-16 PROCEDURE — 96376 TX/PRO/DX INJ SAME DRUG ADON: CPT

## 2023-05-16 PROCEDURE — 82330 ASSAY OF CALCIUM: CPT

## 2023-05-16 PROCEDURE — 85018 HEMOGLOBIN: CPT

## 2023-05-16 PROCEDURE — 74177 CT ABD & PELVIS W/CONTRAST: CPT | Mod: MD

## 2023-05-16 PROCEDURE — 80053 COMPREHEN METABOLIC PANEL: CPT

## 2023-05-16 PROCEDURE — 83880 ASSAY OF NATRIURETIC PEPTIDE: CPT

## 2023-05-16 PROCEDURE — 96375 TX/PRO/DX INJ NEW DRUG ADDON: CPT

## 2023-05-16 PROCEDURE — 83690 ASSAY OF LIPASE: CPT

## 2023-05-16 PROCEDURE — 84132 ASSAY OF SERUM POTASSIUM: CPT

## 2023-05-16 PROCEDURE — 70450 CT HEAD/BRAIN W/O DYE: CPT | Mod: MD

## 2023-05-16 PROCEDURE — 74177 CT ABD & PELVIS W/CONTRAST: CPT | Mod: 26,MD

## 2023-05-16 PROCEDURE — 83605 ASSAY OF LACTIC ACID: CPT

## 2023-05-16 PROCEDURE — 36415 COLL VENOUS BLD VENIPUNCTURE: CPT

## 2023-05-16 PROCEDURE — 99285 EMERGENCY DEPT VISIT HI MDM: CPT

## 2023-05-16 PROCEDURE — 99285 EMERGENCY DEPT VISIT HI MDM: CPT | Mod: 25

## 2023-05-16 PROCEDURE — 70450 CT HEAD/BRAIN W/O DYE: CPT | Mod: 26,MD

## 2023-05-16 PROCEDURE — 71045 X-RAY EXAM CHEST 1 VIEW: CPT

## 2023-05-16 PROCEDURE — 84295 ASSAY OF SERUM SODIUM: CPT

## 2023-05-16 PROCEDURE — 93005 ELECTROCARDIOGRAM TRACING: CPT

## 2023-05-16 PROCEDURE — 84484 ASSAY OF TROPONIN QUANT: CPT

## 2023-05-16 PROCEDURE — 96374 THER/PROPH/DIAG INJ IV PUSH: CPT | Mod: XU

## 2023-05-16 PROCEDURE — 82803 BLOOD GASES ANY COMBINATION: CPT

## 2023-05-16 PROCEDURE — 82435 ASSAY OF BLOOD CHLORIDE: CPT

## 2023-05-16 RX ORDER — ACETAMINOPHEN 500 MG
975 TABLET ORAL ONCE
Refills: 0 | Status: COMPLETED | OUTPATIENT
Start: 2023-05-16 | End: 2023-05-16

## 2023-05-16 RX ORDER — METOCLOPRAMIDE HCL 10 MG
10 TABLET ORAL ONCE
Refills: 0 | Status: COMPLETED | OUTPATIENT
Start: 2023-05-16 | End: 2023-05-16

## 2023-05-16 RX ORDER — MAGNESIUM SULFATE 500 MG/ML
2 VIAL (ML) INJECTION ONCE
Refills: 0 | Status: COMPLETED | OUTPATIENT
Start: 2023-05-16 | End: 2023-05-16

## 2023-05-16 RX ORDER — HYDROMORPHONE HYDROCHLORIDE 2 MG/ML
0.5 INJECTION INTRAMUSCULAR; INTRAVENOUS; SUBCUTANEOUS ONCE
Refills: 0 | Status: DISCONTINUED | OUTPATIENT
Start: 2023-05-16 | End: 2023-05-16

## 2023-05-16 RX ORDER — CALCIUM GLUCONATE 100 MG/ML
2 VIAL (ML) INTRAVENOUS ONCE
Refills: 0 | Status: COMPLETED | OUTPATIENT
Start: 2023-05-16 | End: 2023-05-16

## 2023-05-16 RX ADMIN — Medication 2 GRAM(S): at 10:21

## 2023-05-16 RX ADMIN — Medication 150 GRAM(S): at 10:21

## 2023-05-16 RX ADMIN — Medication 10 MILLIGRAM(S): at 07:50

## 2023-05-16 RX ADMIN — Medication 975 MILLIGRAM(S): at 06:40

## 2023-05-16 RX ADMIN — Medication 2 GRAM(S): at 08:48

## 2023-05-16 RX ADMIN — Medication 975 MILLIGRAM(S): at 08:52

## 2023-05-16 RX ADMIN — Medication 10 MILLIGRAM(S): at 10:10

## 2023-05-16 RX ADMIN — Medication 200 GRAM(S): at 08:48

## 2023-05-16 RX ADMIN — HYDROMORPHONE HYDROCHLORIDE 0.5 MILLIGRAM(S): 2 INJECTION INTRAMUSCULAR; INTRAVENOUS; SUBCUTANEOUS at 11:46

## 2023-05-16 RX ADMIN — HYDROMORPHONE HYDROCHLORIDE 0.5 MILLIGRAM(S): 2 INJECTION INTRAMUSCULAR; INTRAVENOUS; SUBCUTANEOUS at 11:35

## 2023-05-16 NOTE — ED ADULT NURSE NOTE - OBJECTIVE STATEMENT
yes... 62YF A&OX4 Ambulatory PMHX of HTN, ESRD (L AV fistuala 62YF A&OX4 Ambulatory PMHX of HTN (carvelidol), ESRD (HD MWF, L AV fistula), hx of peritoneal dialysis & monthly blood transfusions) presents to the ED c/o nausea, HA x 1 day s/p dialysis on Monday 05/15/23 and hypertensive (177/93 today morning). per daughter (English  and historian), pt is having trouble keep her eyes open but denies blurry vision and is having abdominal pain/tenderness. pt denies f/c/v/d, CP, SOB.

## 2023-05-16 NOTE — ED PROVIDER NOTE - CLINICAL SUMMARY MEDICAL DECISION MAKING FREE TEXT BOX
Patsy Morataya MD, PGY-3: 61 year old woman with a history of MAK 2 + polycythemia vera. diagnosed in 2012. Complicated by with splenomegaly and history of splenic vein thrombosis (2015), ESRD on HD (Tu/Th/Sat) via LUE AVF (2/2 chronic GN, started Dec 2017), HTN, p/w n/v, abd pain, h/a. vss, pe no signs of volume overload, normal neuro exam. ddx includes migraine vs. ICH, also consider intraabdominal infection, ACS, acute choly. plan for ct head, ct a/p, basic labs, pain control. Patsy Morataya MD, PGY-3: 61 year old woman with a history of MAK 2 + polycythemia vera. diagnosed in 2012. Complicated by with splenomegaly and history of splenic vein thrombosis (2015), ESRD on HD (Tu/Th/Sat) via LUE AVF (2/2 chronic GN, started Dec 2017), HTN, p/w n/v, abd pain, h/a. vss, pe no signs of volume overload, normal neuro exam. ddx includes migraine vs. ICH, also consider intraabdominal infection, ACS, acute choly. plan for ct head, ct a/p, basic labs, pain control. Presentation concerning for HA, post HD disequilibrium, ACS.     Carmine Gipson MD: I agree with the above statement and in addition pt states that she had her normal HD session yesterday though was told during that her BP got a bit low with SBO in the 90s and states 3 hours after the session ended is when she started having sxs. on presentation only endorses a HA now that the ab pain has improved. On exam pt slightly uncomfortable appearing, but non-toxic and no acute distresses and no FND. No ab TTP, no edema, normal cardiac and lung exam.  Will give HA cocktail and obtain CTH and CT ab/pelv given PMH of splenic infarct. EKG with peaked T's which was seen on prior EKGs, but will give calcium gluc while awaiting electrolyte results.

## 2023-05-16 NOTE — ED PROVIDER NOTE - OBJECTIVE STATEMENT
61 year old woman with a history of MAK 2 + polycythemia vera. diagnosed in 2012. Complicated by with splenomegaly and history of splenic vein thrombosis (2015), ESRD on HD (Tu/Th/Sat) via LUE AVF (2/2 chronic GN, started Dec 2017), HTN (incomplete) 61 year old woman with a history of MAK 2 + polycythemia vera. diagnosed in 2012. Complicated by with splenomegaly and history of splenic vein thrombosis (2015), ESRD on HD (Tu/Th/Sat) via LUE AVF (2/2 chronic GN, started Dec 2017), HTN, p/w n/v, abd pain, h/a. onset 1d prior s/p HD session (complete). h/a diffuse, not a/w blurry vision, new neuro changes of numbness/paresthesias/weakness to b/l UE, LE. denies recent trauma/falls. h/a worsening since onset. abd pain diffuse, no diarrhea/constipation. prior hx includes PD catheter placement, acute choly. denies recent fevers, cp, sob, b/l LE edema.

## 2023-05-16 NOTE — ED PROVIDER NOTE - DATE/TIME 1
Affect and characteristics of appearance, verbalizations, behaviors are appropriate 16-May-2023 13:20

## 2023-05-16 NOTE — ED ADULT NURSE NOTE - CAS EDN DISCHARGE ASSESSMENT
"Chief Complaint   Patient presents with     Medicare Visit     Blood Draw     fasting labs.      Initial BP (!) 182/78 (BP Location: Right arm, Patient Position: Sitting, Cuff Size: Adult Large)   Pulse 54   Temp 98  F (36.7  C) (Oral)   Resp 20   Wt 104.2 kg (229 lb 12.8 oz)   SpO2 100%   BMI 28.53 kg/m   Estimated body mass index is 28.53 kg/m  as calculated from the following:    Height as of 4/6/21: 1.911 m (6' 3.25\").    Weight as of this encounter: 104.2 kg (229 lb 12.8 oz).  BP completed using cuff size large right arm    Lisa Magill, CMA    " Alert and oriented to person, place and time

## 2023-05-16 NOTE — ED PROVIDER NOTE - PROGRESS NOTE DETAILS
Emily Smith PGY-3 sxs improved after meds. HA now 2/10. improved after Dilaudid.  plan to dc with neurology follow up. strict return precautions for worsening headache, numbness FND or any new concerning sxs to come back to ER

## 2023-05-16 NOTE — ED PROVIDER NOTE - PATIENT PORTAL LINK FT
You can access the FollowMyHealth Patient Portal offered by [facility] by registering at the following website: [email or web address]. By joining [facility]’s FollowMyHealth portal, you will also be able to view your health information using other applications (apps) compatible with our system.

## 2023-05-16 NOTE — ED ADULT NURSE REASSESSMENT NOTE - NS ED NURSE REASSESS COMMENT FT1
Port accessed via sterile technique. 2 RNs at the bedside to maintain sterility. 20G moraes needle used to access medi-port. Labs obtained via port.

## 2023-05-16 NOTE — ED ADULT NURSE NOTE - NSFALLUNIVINTERV_ED_ALL_ED
Bed/Stretcher in lowest position, wheels locked, appropriate side rails in place/Call bell, personal items and telephone in reach/Instruct patient to call for assistance before getting out of bed/chair/stretcher/Non-slip footwear applied when patient is off stretcher/[place] to call system/Physically safe environment - no spills, clutter or unnecessary equipment/Purposeful proactive rounding/Room/bathroom lighting operational, light cord in reach

## 2023-05-16 NOTE — ED PROVIDER NOTE - PHYSICAL EXAMINATION
Gen: WDWN, NAD  HEENT: EOMI, no nasal discharge, mucous membranes moist  CV: L UE AV fistula, palpable thrill  Resp: no accessory muscle use, no increased work of breathing  GI: Abdomen soft non-distended, diffuse abd ttp  MSK: No open wounds, no bruising, no LE edema  Neuro: A&Ox4, following commands, moving all four extremities spontaneously. CN3-12 intact, EOMI. PERRLA, 5/5 strength in b/l UE/LE.  Sensation intact bl in UE/LE.   Psych: appropriate mood

## 2023-05-16 NOTE — ED PROVIDER NOTE - NSFOLLOWUPCLINICS_GEN_ALL_ED_FT
[facility]  Neurology  [address] [place] [address] [address]rd [place] [address]  Phone: [phone]  Fax:     [facility]  Neurology  [address] [place] [address]  Phone: [phone]  Fax: [phone]

## 2023-05-18 ENCOUNTER — RESULT REVIEW (OUTPATIENT)
Age: 63
End: 2023-05-18

## 2023-05-18 ENCOUNTER — APPOINTMENT (OUTPATIENT)
Dept: ENDOVASCULAR SURGERY | Facility: CLINIC | Age: 63
End: 2023-05-18
Payer: MEDICAID

## 2023-05-18 VITALS
HEART RATE: 74 BPM | RESPIRATION RATE: 16 BRPM | HEIGHT: 62 IN | DIASTOLIC BLOOD PRESSURE: 84 MMHG | WEIGHT: 123 LBS | OXYGEN SATURATION: 98 % | TEMPERATURE: 97.8 F | SYSTOLIC BLOOD PRESSURE: 146 MMHG | BODY MASS INDEX: 22.63 KG/M2

## 2023-05-18 PROCEDURE — 36908Z: CUSTOM

## 2023-05-18 PROCEDURE — 36902Z: CUSTOM | Mod: 59

## 2023-05-18 NOTE — HISTORY OF PRESENT ILLNESS
[] : left radiocephalic fistula [FreeTextEntry1] : Dr. Dempsey 9/18/17 [FreeTextEntry4] : Yesterday  [FreeTextEntry5] : Yesterday at 8pm [FreeTextEntry6] : Dr. Koroma

## 2023-05-18 NOTE — ASSESSMENT
[Other: _____] : [unfilled] [FreeTextEntry1] : ESRD on HD with central venous stenosis for fistulogram and possible intervention.\par \par Receurrent stenosis centrally PTA to 14 mm and fistula and axillary veins PTA to 8mm w good result. \par \par For fistulagram and possible intervention.

## 2023-05-18 NOTE — REASON FOR VISIT
[Other ___] : a [unfilled] visit for [Prolonged Bleeding] : prolonged bleeding [Spouse] : spouse [FreeTextEntry2] : central stenosis

## 2023-06-01 NOTE — PATIENT PROFILE ADULT. - NS PRO TALK SOMEONE YN
Subjective:      Patient ID: Matt Friend is a 63 y.o. male. HPI  CC; lipoma excision  Here for excision right shoulder lipoma. Reports growth and discomfort    Excision of lesion done in office today after consent obtained. Under sterile conditions, 4 ml of 2% lidocaine with epi was infused. A 3 cm incision made over top of 3 cm SQ mass and the lipoma removed. Hemostasis was obtained with direct pressure and wound closed with 4-0 nylons. No immediate complications noted. Dressing placed. No specimen sent. Review of Systems    Objective:   Physical Exam  3 cm lipoma right shoulder overlying trap  Assessment:       Diagnosis Orders   1. Lipoma of right shoulder  IL EXCISION TUMOR SOFT TISSUE SHOULDER SUBQ <3CM              Plan:       Tolerated procedure well  Remove dressing 48 hours  OK to shower, keep clean and dry  Suture removal 2 weeks        Bridgette Patel MD
no

## 2023-06-05 NOTE — ED ADULT NURSE NOTE - ED STAT RN HANDOFF DETAILS
She reports feeling reassuring daily fetal activity and will continue to record.  She gained 8.2 lbs over the past 5 weeks and denies any fluid leakage or regular uterine contractions.  She requests a refill of her Albuterol inhaler so this script was placed.  We discussed the finding of an echogenic intracardiac focus on US (5/8/2023) and no further follow up has been advised by M.  The patient would like a maternal AFP checked so this order was placed.  She understands that her [facility] results were normal.  She declines a Tdap and PPTL.  Due to a possible sinus infection with green mucous discharge, a referral to FP was advised.  The patient denies any sore throat so declined a strep test today.   report given to Noemí BANKS in dialysis

## 2023-06-05 NOTE — PROGRESS NOTE ADULT - ASSESSMENT
Patient is a 58 y/o F w/ ESRD on HD who presented to OSH with abdominal pain, found with significant hemoperitoneum with unclear source of bleeding. [Follow-Up] : a follow-up evaluation of [FreeTextEntry2] : final deicsion for surgery

## 2023-06-06 ENCOUNTER — OUTPATIENT (OUTPATIENT)
Dept: OUTPATIENT SERVICES | Facility: HOSPITAL | Age: 63
LOS: 1 days | Discharge: HOME | End: 2023-06-06

## 2023-06-06 DIAGNOSIS — I77.0 ARTERIOVENOUS FISTULA, ACQUIRED: Chronic | ICD-10-CM

## 2023-06-06 DIAGNOSIS — D64.9 ANEMIA, UNSPECIFIED: ICD-10-CM

## 2023-06-07 ENCOUNTER — RESULT REVIEW (OUTPATIENT)
Age: 63
End: 2023-06-07

## 2023-06-07 ENCOUNTER — OUTPATIENT (OUTPATIENT)
Dept: OUTPATIENT SERVICES | Facility: HOSPITAL | Age: 63
LOS: 1 days | End: 2023-06-07
Payer: MEDICAID

## 2023-06-07 ENCOUNTER — APPOINTMENT (OUTPATIENT)
Dept: HEMATOLOGY ONCOLOGY | Facility: CLINIC | Age: 63
End: 2023-06-07

## 2023-06-07 DIAGNOSIS — I77.0 ARTERIOVENOUS FISTULA, ACQUIRED: Chronic | ICD-10-CM

## 2023-06-07 DIAGNOSIS — D75.81 MYELOFIBROSIS: ICD-10-CM

## 2023-06-07 DIAGNOSIS — T85.898A OTHER SPECIFIED COMPLICATION OF OTHER INTERNAL PROSTHETIC DEVICES, IMPLANTS AND GRAFTS, INITIAL ENCOUNTER: Chronic | ICD-10-CM

## 2023-06-07 DIAGNOSIS — Z99.2 DEPENDENCE ON RENAL DIALYSIS: Chronic | ICD-10-CM

## 2023-06-07 LAB
ANISOCYTOSIS BLD QL: SLIGHT — SIGNIFICANT CHANGE UP
BASOPHILS # BLD AUTO: 0.06 K/UL — SIGNIFICANT CHANGE UP (ref 0–0.2)
BASOPHILS NFR BLD AUTO: 1 % — SIGNIFICANT CHANGE UP (ref 0–2)
DACRYOCYTES BLD QL SMEAR: SLIGHT — SIGNIFICANT CHANGE UP
ELLIPTOCYTES BLD QL SMEAR: SLIGHT — SIGNIFICANT CHANGE UP
EOSINOPHIL # BLD AUTO: 0.13 K/UL — SIGNIFICANT CHANGE UP (ref 0–0.5)
EOSINOPHIL NFR BLD AUTO: 2 % — SIGNIFICANT CHANGE UP (ref 0–6)
HCT VFR BLD CALC: 25.1 % — LOW (ref 34.5–45)
HGB BLD-MCNC: 8 G/DL — LOW (ref 11.5–15.5)
LYMPHOCYTES # BLD AUTO: 1.13 K/UL — SIGNIFICANT CHANGE UP (ref 1–3.3)
LYMPHOCYTES # BLD AUTO: 18 % — SIGNIFICANT CHANGE UP (ref 13–44)
MACROCYTES BLD QL: SLIGHT — SIGNIFICANT CHANGE UP
MCHC RBC-ENTMCNC: 31.5 PG — SIGNIFICANT CHANGE UP (ref 27–34)
MCHC RBC-ENTMCNC: 31.9 G/DL — LOW (ref 32–36)
MCV RBC AUTO: 98.8 FL — SIGNIFICANT CHANGE UP (ref 80–100)
METAMYELOCYTES # FLD: 1 % — HIGH (ref 0–0)
MONOCYTES # BLD AUTO: 0.25 K/UL — SIGNIFICANT CHANGE UP (ref 0–0.9)
MONOCYTES NFR BLD AUTO: 4 % — SIGNIFICANT CHANGE UP (ref 2–14)
MYELOCYTES NFR BLD: 1 % — HIGH (ref 0–0)
NEUTROPHILS # BLD AUTO: 4.57 K/UL — SIGNIFICANT CHANGE UP (ref 1.8–7.4)
NEUTROPHILS NFR BLD AUTO: 73 % — SIGNIFICANT CHANGE UP (ref 43–77)
NRBC # BLD: 2 /100 — HIGH (ref 0–0)
NRBC # BLD: SIGNIFICANT CHANGE UP /100 WBCS (ref 0–0)
PLAT MORPH BLD: NORMAL — SIGNIFICANT CHANGE UP
PLATELET # BLD AUTO: 151 K/UL — SIGNIFICANT CHANGE UP (ref 150–400)
POIKILOCYTOSIS BLD QL AUTO: SLIGHT — SIGNIFICANT CHANGE UP
POLYCHROMASIA BLD QL SMEAR: SLIGHT — SIGNIFICANT CHANGE UP
RBC # BLD: 2.54 M/UL — LOW (ref 3.8–5.2)
RBC # FLD: 18.5 % — HIGH (ref 10.3–14.5)
RBC BLD AUTO: ABNORMAL
SCHISTOCYTES BLD QL AUTO: SLIGHT — SIGNIFICANT CHANGE UP
WBC # BLD: 6.26 K/UL — SIGNIFICANT CHANGE UP (ref 3.8–10.5)
WBC # FLD AUTO: 6.26 K/UL — SIGNIFICANT CHANGE UP (ref 3.8–10.5)

## 2023-06-08 ENCOUNTER — APPOINTMENT (OUTPATIENT)
Dept: INFUSION THERAPY | Facility: HOSPITAL | Age: 63
End: 2023-06-08

## 2023-06-08 ENCOUNTER — NON-APPOINTMENT (OUTPATIENT)
Age: 63
End: 2023-06-08

## 2023-06-08 PROCEDURE — 86900 BLOOD TYPING SEROLOGIC ABO: CPT

## 2023-06-08 PROCEDURE — 86901 BLOOD TYPING SEROLOGIC RH(D): CPT

## 2023-06-08 PROCEDURE — 86922 COMPATIBILITY TEST ANTIGLOB: CPT

## 2023-06-08 PROCEDURE — 86902 BLOOD TYPE ANTIGEN DONOR EA: CPT

## 2023-06-08 PROCEDURE — 86850 RBC ANTIBODY SCREEN: CPT

## 2023-06-30 ENCOUNTER — NON-APPOINTMENT (OUTPATIENT)
Age: 63
End: 2023-06-30

## 2023-06-30 ENCOUNTER — APPOINTMENT (OUTPATIENT)
Dept: OPHTHALMOLOGY | Facility: CLINIC | Age: 63
End: 2023-06-30
Payer: MEDICAID

## 2023-06-30 PROCEDURE — 99214 OFFICE O/P EST MOD 30 MIN: CPT | Mod: 25

## 2023-06-30 PROCEDURE — 92285 EXTERNAL OCULAR PHOTOGRAPHY: CPT

## 2023-06-30 PROCEDURE — 68801 DILATE TEAR DUCT OPENING: CPT | Mod: LT

## 2023-07-03 NOTE — ED ADULT TRIAGE NOTE - NS ED TRIAGE AVPU SCALE
Problem: PHYSICAL THERAPY ADULT  Goal: Performs mobility at highest level of function for planned discharge setting. See evaluation for individualized goals. Description: Treatment/Interventions: LE strengthening/ROM, Functional transfer training, Therapeutic exercise, Endurance training, Cognitive reorientation, Patient/family training, Equipment eval/education, Bed mobility, Gait training, Spoke to nursing, Spoke to case management, OT          See flowsheet documentation for full assessment, interventions and recommendations. Outcome: Progressing  Note: Prognosis: Fair  Problem List: Decreased strength, Decreased endurance, Impaired balance, Decreased mobility, Pain, Decreased cognition, Decreased safety awareness, Impaired judgement  Assessment: Patient was received supine in bed . Patient was agreeable to therapy services today. PT session focused on transfer and therapeutic exercise today in order to improve functional mobility and independence. Functional mobility was performed as described above. Pt was only agreeable to long sit in bed. Pt deferred further mobility due to fatigue. Pt was then led through several therapeutic exercises. Pt denied pain with movements today. Pt was educated about HEP and how to safely complete exercises. Pt was provided print out of exercises to complete throughout the day. Pt repots understanding. Patient will benefit from continued PT services while in hospital in order to address remaining limitations. The patients AM-PAC Basic Mobility Inpatient Short From Raw Score is 7 . A Raw score of 7 suggests that the patient may benefit from discharge to post acute rehab. PT recommendation at this time is for post acute rehab. Please also refer to the recommendation of the Physical Therapist for safe discharge planning.   Barriers to Discharge: Decreased caregiver support, Inaccessible home environment     PT Discharge Recommendation: Post acute rehabilitation services    See flowsheet documentation for full assessment. Alert-The patient is alert, awake and responds to voice. The patient is oriented to time, place, and person. The triage nurse is able to obtain subjective information.

## 2023-07-18 DIAGNOSIS — D75.81 MYELOFIBROSIS: ICD-10-CM

## 2023-07-18 DIAGNOSIS — D45 POLYCYTHEMIA VERA: ICD-10-CM

## 2023-07-18 DIAGNOSIS — N18.6 END STAGE RENAL DISEASE: ICD-10-CM

## 2023-07-21 ENCOUNTER — APPOINTMENT (OUTPATIENT)
Dept: OPHTHALMOLOGY | Facility: CLINIC | Age: 63
End: 2023-07-21
Payer: COMMERCIAL

## 2023-07-21 ENCOUNTER — NON-APPOINTMENT (OUTPATIENT)
Age: 63
End: 2023-07-21

## 2023-07-21 PROCEDURE — 99213 OFFICE O/P EST LOW 20 MIN: CPT

## 2023-07-21 PROCEDURE — 92285 EXTERNAL OCULAR PHOTOGRAPHY: CPT

## 2023-07-22 ENCOUNTER — INPATIENT (INPATIENT)
Facility: HOSPITAL | Age: 63
LOS: 4 days | Discharge: HOME | DRG: 252 | End: 2023-07-27
Attending: STUDENT IN AN ORGANIZED HEALTH CARE EDUCATION/TRAINING PROGRAM | Admitting: HOSPITALIST
Payer: MEDICAID

## 2023-07-22 VITALS
WEIGHT: 125 LBS | RESPIRATION RATE: 18 BRPM | DIASTOLIC BLOOD PRESSURE: 77 MMHG | HEART RATE: 72 BPM | OXYGEN SATURATION: 100 % | TEMPERATURE: 98 F | SYSTOLIC BLOOD PRESSURE: 127 MMHG

## 2023-07-22 DIAGNOSIS — I77.0 ARTERIOVENOUS FISTULA, ACQUIRED: Chronic | ICD-10-CM

## 2023-07-22 DIAGNOSIS — Z99.2 DEPENDENCE ON RENAL DIALYSIS: Chronic | ICD-10-CM

## 2023-07-22 DIAGNOSIS — T85.898A OTHER SPECIFIED COMPLICATION OF OTHER INTERNAL PROSTHETIC DEVICES, IMPLANTS AND GRAFTS, INITIAL ENCOUNTER: Chronic | ICD-10-CM

## 2023-07-22 LAB
ALBUMIN SERPL ELPH-MCNC: 4 G/DL — SIGNIFICANT CHANGE UP (ref 3.3–5)
ALP SERPL-CCNC: 69 U/L — SIGNIFICANT CHANGE UP (ref 40–120)
ALT FLD-CCNC: 15 U/L — SIGNIFICANT CHANGE UP (ref 10–45)
ANION GAP SERPL CALC-SCNC: 15 MMOL/L — SIGNIFICANT CHANGE UP (ref 5–17)
APPEARANCE UR: CLEAR — SIGNIFICANT CHANGE UP
AST SERPL-CCNC: 15 U/L — SIGNIFICANT CHANGE UP (ref 10–40)
BACTERIA # UR AUTO: NEGATIVE — SIGNIFICANT CHANGE UP
BILIRUB SERPL-MCNC: 1.5 MG/DL — HIGH (ref 0.2–1.2)
BILIRUB UR-MCNC: NEGATIVE — SIGNIFICANT CHANGE UP
BUN SERPL-MCNC: 43 MG/DL — HIGH (ref 7–23)
CALCIUM SERPL-MCNC: 8.6 MG/DL — SIGNIFICANT CHANGE UP (ref 8.4–10.5)
CHLORIDE SERPL-SCNC: 102 MMOL/L — SIGNIFICANT CHANGE UP (ref 96–108)
CO2 SERPL-SCNC: 22 MMOL/L — SIGNIFICANT CHANGE UP (ref 22–31)
COLOR SPEC: YELLOW — SIGNIFICANT CHANGE UP
CREAT SERPL-MCNC: 9.53 MG/DL — HIGH (ref 0.5–1.3)
DIFF PNL FLD: NEGATIVE — SIGNIFICANT CHANGE UP
EGFR: 4 ML/MIN/1.73M2 — LOW
EPI CELLS # UR: 3 /HPF — SIGNIFICANT CHANGE UP
GLUCOSE SERPL-MCNC: 238 MG/DL — HIGH (ref 70–99)
GLUCOSE UR QL: ABNORMAL
HYALINE CASTS # UR AUTO: 1 /LPF — SIGNIFICANT CHANGE UP (ref 0–2)
KETONES UR-MCNC: NEGATIVE — SIGNIFICANT CHANGE UP
LEUKOCYTE ESTERASE UR-ACNC: NEGATIVE — SIGNIFICANT CHANGE UP
NITRITE UR-MCNC: NEGATIVE — SIGNIFICANT CHANGE UP
PH UR: 8.5 — HIGH (ref 5–8)
PHOSPHATE SERPL-MCNC: 3.6 MG/DL — SIGNIFICANT CHANGE UP (ref 2.5–4.5)
POTASSIUM SERPL-MCNC: 5.8 MMOL/L — HIGH (ref 3.5–5.3)
POTASSIUM SERPL-SCNC: 5.8 MMOL/L — HIGH (ref 3.5–5.3)
PROT SERPL-MCNC: 6.8 G/DL — SIGNIFICANT CHANGE UP (ref 6–8.3)
PROT UR-MCNC: ABNORMAL
RBC CASTS # UR COMP ASSIST: 2 /HPF — SIGNIFICANT CHANGE UP (ref 0–4)
SODIUM SERPL-SCNC: 139 MMOL/L — SIGNIFICANT CHANGE UP (ref 135–145)
SP GR SPEC: 1.01 — LOW (ref 1.01–1.02)
UROBILINOGEN FLD QL: NEGATIVE — SIGNIFICANT CHANGE UP
WBC UR QL: 1 /HPF — SIGNIFICANT CHANGE UP (ref 0–5)

## 2023-07-22 PROCEDURE — 71045 X-RAY EXAM CHEST 1 VIEW: CPT | Mod: 26

## 2023-07-22 PROCEDURE — 93931 UPPER EXTREMITY STUDY: CPT | Mod: 26

## 2023-07-22 PROCEDURE — 99291 CRITICAL CARE FIRST HOUR: CPT

## 2023-07-22 RX ORDER — DEXTROSE 50 % IN WATER 50 %
50 SYRINGE (ML) INTRAVENOUS ONCE
Refills: 0 | Status: DISCONTINUED | OUTPATIENT
Start: 2023-07-22 | End: 2023-07-23

## 2023-07-22 RX ORDER — CALCIUM GLUCONATE 100 MG/ML
1 VIAL (ML) INTRAVENOUS ONCE
Refills: 0 | Status: DISCONTINUED | OUTPATIENT
Start: 2023-07-22 | End: 2023-07-23

## 2023-07-22 RX ORDER — SODIUM ZIRCONIUM CYCLOSILICATE 10 G/10G
10 POWDER, FOR SUSPENSION ORAL ONCE
Refills: 0 | Status: COMPLETED | OUTPATIENT
Start: 2023-07-22 | End: 2023-07-22

## 2023-07-22 RX ORDER — SODIUM BICARBONATE 1 MEQ/ML
50 SYRINGE (ML) INTRAVENOUS ONCE
Refills: 0 | Status: DISCONTINUED | OUTPATIENT
Start: 2023-07-22 | End: 2023-07-23

## 2023-07-22 RX ORDER — INSULIN HUMAN 100 [IU]/ML
10 INJECTION, SOLUTION SUBCUTANEOUS ONCE
Refills: 0 | Status: DISCONTINUED | OUTPATIENT
Start: 2023-07-22 | End: 2023-07-23

## 2023-07-22 NOTE — ED PROVIDER NOTE - OBJECTIVE STATEMENT
63 year old female with myelofibrosis, polycythemia vera, ESRD on MWF schedule via LUE AVF, portal HTN c/b IGV1, subclavian port for transfusions, gastric varices, presents for clotted fistula and inability to undergo dialysis. She last got her dialysis on W and had a full session. SHe reports bleeding from the fistula site which was controlled on wednesday. She was unable to get a session on Friday because the filstula seemed clotted. SHe was told to come into the ED. She has complaints of some general fatige but no other symptoms of chest pain, palpitations, GI symptopms, sensory or motor changes.

## 2023-07-22 NOTE — ED ADULT TRIAGE NOTE - CHIEF COMPLAINT QUOTE
62 YO F with HD Fistula on LUE pt got HD 3 days ago fistula started bleeding 2 days ago. pt gets HD M, W Friday was unable to get HD yesterday and was told her Fistula is not working.

## 2023-07-22 NOTE — ED ADULT NURSE NOTE - OBJECTIVE STATEMENT
Pt is a 62 YO F with HD Fistula on LUE pt got HD 3 days ago fistula started bleeding 2 days ago. pt gets HD M, W Friday was unable to get HD yesterday and was told her Fistula is not working.  PT sent for fistula evaluation.

## 2023-07-22 NOTE — H&P ADULT - NSHPLABSRESULTS_GEN_ALL_CORE
Labs personally reviewed:      07-22    139  |  102  |  43<H>  ----------------------------<  238<H>  5.8<H>   |  22  |  9.53<H>    Ca    8.6      22 Jul 2023 23:16  Phos  3.6     07-22    TPro  6.8  /  Alb  4.0  /  TBili  1.5<H>  /  DBili  x   /  AST  15  /  ALT  15  /  AlkPhos  69  07-22        LIVER FUNCTIONS - ( 22 Jul 2023 23:16 )  Alb: 4.0 g/dL / Pro: 6.8 g/dL / ALK PHOS: 69 U/L / ALT: 15 U/L / AST: 15 U/L / GGT: x             Urinalysis Basic - ( 22 Jul 2023 23:16 )    Color: x / Appearance: x / SG: x / pH: x  Gluc: 238 mg/dL / Ketone: x  / Bili: x / Urobili: x   Blood: x / Protein: x / Nitrite: x   Leuk Esterase: x / RBC: x / WBC x   Sq Epi: x / Non Sq Epi: x / Bacteria: x      CAPILLARY BLOOD GLUCOSE          Imaging:   CXR personally reviewed: Right chest wall port catheter with tip terminating in the SVC.  Vasc duplex: Occlusive thrombosis of the left upper extremity AV fistula and outflow   stent.    EKG personally reviewed Labs personally reviewed:      07-22    139  |  102  |  43<H>  ----------------------------<  238<H>  5.8<H>   |  22  |  9.53<H>    Ca    8.6      22 Jul 2023 23:16  Phos  3.6     07-22    TPro  6.8  /  Alb  4.0  /  TBili  1.5<H>  /  DBili  x   /  AST  15  /  ALT  15  /  AlkPhos  69  07-22        LIVER FUNCTIONS - ( 22 Jul 2023 23:16 )  Alb: 4.0 g/dL / Pro: 6.8 g/dL / ALK PHOS: 69 U/L / ALT: 15 U/L / AST: 15 U/L / GGT: x             Urinalysis Basic - ( 22 Jul 2023 23:16 )    Color: x / Appearance: x / SG: x / pH: x  Gluc: 238 mg/dL / Ketone: x  / Bili: x / Urobili: x   Blood: x / Protein: x / Nitrite: x   Leuk Esterase: x / RBC: x / WBC x   Sq Epi: x / Non Sq Epi: x / Bacteria: x      CAPILLARY BLOOD GLUCOSE          Imaging:   CXR personally reviewed: Right chest wall port catheter with tip terminating in the SVC.  Vasc duplex: Occlusive thrombosis of the left upper extremity AV fistula and outflow   stent.    EKG - ordered

## 2023-07-22 NOTE — H&P ADULT - PROBLEM SELECTOR PLAN 2
mild hyperkalemia , no other indication for urgent HD , will check EKG , if no changes can treat w/ Lokelma   - Renal consult - to be arranged by day team   - renal diet  - f/u EKG , IF peaked T waves present , can give calcium gluconate  - trend serum K   - Kayexalate   - monitor phos  - nephro deonna

## 2023-07-22 NOTE — ED PROVIDER NOTE - PHYSICAL EXAMINATION
GENERAL: Not in acute distress, non-toxic appearing  HEAD: normocephalic, atraumatic  HEENT: EOMI, normal conjunctiva, oral mucosa moist, neck supple  CARDIAC: No thrill palpable on fistula, regular rate and rhythm  PULM: normal work of breathing, speaking full sentences  GI: abdomen nondistended, soft, nontender, no guarding or rebound tenderness  NEURO: alert and oriented x 3, normal speech, no focal motor or sensory deficits, gait normal, no gross neurologic deficit  MSK: No visible deformities, no peripheral edema  SKIN: No visible rashes, dry, well-perfused  PSYCH: appropriate mood and affect

## 2023-07-22 NOTE — H&P ADULT - NSHPPHYSICALEXAM_GEN_ALL_CORE
Vital Signs Last 24 Hrs  T(C): 36.6 (22 Jul 2023 20:19), Max: 36.9 (22 Jul 2023 17:29)  T(F): 97.9 (22 Jul 2023 20:19), Max: 98.4 (22 Jul 2023 17:29)  HR: 77 (22 Jul 2023 20:19) (72 - 77)  BP: 133/73 (22 Jul 2023 20:19) (127/77 - 133/73)  BP(mean): --  RR: 18 (22 Jul 2023 20:19) (18 - 18)  SpO2: 100% (22 Jul 2023 20:19) (100% - 100%)    Parameters below as of 22 Jul 2023 20:19  Patient On (Oxygen Delivery Method): room air Vital Signs Last 24 Hrs  T(C): 36.6 (22 Jul 2023 20:19), Max: 36.9 (22 Jul 2023 17:29)  T(F): 97.9 (22 Jul 2023 20:19), Max: 98.4 (22 Jul 2023 17:29)  HR: 77 (22 Jul 2023 20:19) (72 - 77)  BP: 133/73 (22 Jul 2023 20:19) (127/77 - 133/73)  BP(mean): --  RR: 18 (22 Jul 2023 20:19) (18 - 18)  SpO2: 100% (22 Jul 2023 20:19) (100% - 100%)    Parameters below as of 22 Jul 2023 20:19  Patient On (Oxygen Delivery Method): room air    GENERAL: No acute distress, well-developed  HEAD:  Atraumatic, Normocephalic  ENT: EOMI, PERRLA, conjunctiva and sclera clear,  moist mucosa no pharyngeal erythema or exudates   NECK: supple , no JVD   CHEST/LUNG: Clear to auscultation bilaterally; No wheeze, equal breath sounds bilaterally   BACK: No spinal tenderness,  No CVA tenderness   HEART: Regular rate and rhythm; No murmurs, rubs, or gallops  ABDOMEN: Soft, Nontender, Nondistended; Bowel sounds present  EXTREMITIES: LUE AVF w/ minimal thrill ,   No clubbing, cyanosis, or edema  MSK: No joint swelling or effusions, ROM intact   PSYCH: Normal behavior/affect  NEUROLOGY: AAOx3, non-focal, cranial nerves intact  SKIN: Normal color, No rashes or lesions

## 2023-07-22 NOTE — H&P ADULT - ASSESSMENT
63F w/  myelofibrosis, polycythemia vera, ESRD on MWF schedule via LUE AVF, portal HTN c/b IGV1, subclavian port for transfusions, gastric varices, presents for clotted fistula and inability to undergo dialysis

## 2023-07-22 NOTE — ED ADULT NURSE NOTE - CHIEF COMPLAINT QUOTE
64 YO F with HD Fistula on LUE pt got HD 3 days ago fistula started bleeding 2 days ago. pt gets HD M, W Friday was unable to get HD yesterday and was told her Fistula is not working.

## 2023-07-22 NOTE — H&P ADULT - BIRTH SEX
Group Therapy Note    Date: 5/31/2022    Group Start Time: 1300  Group End Time: 0628  Group Topic: Psychoeducation    [facility]    ANGE Vinson        Group Therapy Note  Clinician took suggestions for game time and the group members chose to play DIANE. Two of the members did not know how to play. The clinician helped one of the members and the group member who knew how to play helped the other member. The group played several hands and kept score. They chose to listen to [id number] [address] [id number] while playing DIANE. Attendees: 3         Patient's Goal:      Notes:  Patient was cooperative and fully engaged in the group. She was eager to learn how to play DIANE. She got up and asked to use the phone [facility] through the group thinking there was an emergency with her daughter. The nursing staff reminded her that she called before group started and her daughter was okay. They reminded her that she could use the phone after group was over. Status After Intervention:  Improved    Participation Level:  Active Listener    Participation Quality: Attentive      Speech:  normal      Thought Process/Content: Delusional      Affective Functioning: Incongruent      Mood: anxious      Level of consciousness:  Preoccupied      Response to Learning: Able to retain information      Endings: None Reported    Modes of Intervention: Socialization and Activity      Discipline Responsible: /Counselor      Signature:  ANGE Vinson Female

## 2023-07-22 NOTE — H&P ADULT - HISTORY OF PRESENT ILLNESS
Patient is a 63 year old female with history of myelofibrosis, polycythemia vera, ESRD on MWF schedule via LUE AVF, portal HTN c/b IGV1, subclavian port for transfusions, gastric varices, presents for clotted fistula and inability to undergo dialysis.   Patient reports last session was Wednesday 7/19,    Patient is a 63 year old female with history of myelofibrosis, polycythemia vera, ESRD on MWF schedule via LUE AVF, portal HTN c/b IGV1, subclavian port for transfusions, gastric varices, presents for clotted fistula and inability to undergo dialysis.   Patient reports last session was Wednesday 7/19, Patient reports no chest pain , no shortness of breath , no nausea or vomiting , no tremors , no lightheadedness or dizziness. She has no pain or swelling at the fistula site.

## 2023-07-22 NOTE — ED ADULT TRIAGE NOTE - LOCATION:
[facility] [address] [place], [address] [place]  Phone: [phone] Fax: [phone] of Care/ Statement of Necessity for Physical Therapy Services    Patient name: Heidy Rosario Start of Care: 2018   Referral source: Mercy Elmore DO : 1924    Medical Diagnosis: Bilateral leg weakness [R29.898]  Unstable gait [R26.81]   Onset Date:ongoing for years   Treatment Diagnosis: generalized weakness   Prior Hospitalization: see medical history Provider#: [id number]   Medications: Verified on Patient summary List    Comorbidities: deaf in right ear, high BP, incontinence,    Prior Level of Function: walking with Rollator in community, only short distances without AD, indep with ADLs, increased time with transfers      The Plan of Care and following information is based on the information from the initial evaluation. Assessment/ key information: Pt is a 82 yo female presenting to therapy with generalized weakness and decreased balance. Pt currently ambulates with Rollator with decreased ivet and forward flexed posture. Pt is independent with ADLs however needs assistance with dishes and heavier household chores. Increased time needed to complete transfers such as sit<>supine and sit<>stand with max UE assist. Pt c/o neck pain mostly at night with vertigo like symptoms with change in positions. Pt demonstrates decreased cervical ROM, UE andLE strength, standing endurance, positive Rhomberg on non compliant surfaces, decreased balance with FGA score 13/30, and decreased tranfers ability with 5x sit to  33.24seconds.  Pt would benefit from skilled PT servcies to address the above deficits to allow pt to complete ADLs, transfers and ambulate with increased ease.      Evaluation Complexity History HIGH Complexity :3+ comorbidities / personal factors will impact the outcome/ POC ; Examination HIGH Complexity : 4+ Standardized tests and measures addressing body structure, function, activity limitation and / or participation in recreation  ;Presentation MEDIUM Complexity : Evolving with changing characteristics  ; Clinical Decision Making MEDIUM Complexity : FOTO score of 26-74  Overall Complexity Rating: MEDIUM  Problem List: pain affecting function, decrease strength, impaired gait/ balance, decrease ADL/ functional abilitiies, decrease activity tolerance, decrease flexibility/ joint mobility and decrease transfer abilities   Treatment Plan may include any combination of the following: Therapeutic exercise, Therapeutic activities, Neuromuscular re-education, Physical agent/modality, Gait/balance training, Manual therapy, Patient education, Self Care training, Functional mobility training and Stair training  Patient / Family readiness to learn indicated by: asking questions, trying to perform skills and interest  Persons(s) to be included in education: patient (P)  Barriers to Learning/Limitations: None  Patient Goal (s): \"If I could walk by myself. Be more secure. \"  Patient Self Reported Health Status: good  Rehabilitation Potential: good    Short Term Goals: STG- To be accomplished in 3 week(s):  1. Pt will be independent with HEP to encourage prophylaxis. Eval:HEP dispensed  Current: NA      2. Pt will be able to stand > 15 minutes with rest break to wash dishes and complete ADLs with increased ease. Eval: 5-10 minutes  Current: NA     Long Term Goals: LTG- To be accomplished in 6 week(s):  1. Pt will demonstrate ability to complete 5x sit to stands in >20 seconds without UE support to demonstrate improved LE strength. .  Eval:33.24 seconds with bilateral UE assist   Current: NA     2. Pt FGA score will improve >/= 4 points to indicate improved functional balance and decrease fall risk  Eval:13/30  Current: NA     3.  Pt bilateral LE strength will improve to >/= 4+/5 to allow pt to complete transfers with increased ease.   Eval: 4/5 bilateral MILDRED  Current: NA     4. Pt FOTO score will increase by >/=6 points to show improvement in subjective function. Eval:52  Current: will address at visit 5       Frequency / Duration: Patient to be seen 2 times per week for 6 weeks. Patient/ Caregiver education and instruction: Diagnosis, prognosis, self care, activity modification and exercises   [x]  Plan of care has been reviewed with ASHU MARX Remesic 2/26/2018 5:51 PM    G-Codes (GP)  Mobility   Current  CK= 40-59%   Goal  CK= 40-59%    The severity rating is based on clinical judgment and the FOTO score. Certification Period: 2/26/18 - 4/23/18    ________________________________________________________________________    I certify that the above Therapy Services are being furnished while the patient is under my care. I agree with the treatment plan and certify that this therapy is necessary. [de-identified] Signature:____________________  Date:____________Time: _________    Please sign and return to [facility] at [facility].  [facility] [address]  Phone: [phone] Fax: [phone] Left arm;

## 2023-07-22 NOTE — H&P ADULT - PROBLEM SELECTOR PLAN 3
no active bleeding , required transfusions in past and h/o iron overload , will recheck CBC to confirm , if remains below 7 on repeat can transfuse 1 U PRBCS   - f/u repeat CBC   - transfuse PRBC 1 U if hgb < 7 ,

## 2023-07-22 NOTE — ED ADULT NURSE NOTE - ISOLATION TYPE:
None bilateral upper extremity Active ROM was WFL (within functional limits)/bilateral  lower extremity Active ROM was WFL (within functional limits)

## 2023-07-22 NOTE — ED ADULT NURSE REASSESSMENT NOTE - NS ED NURSE REASSESS COMMENT FT1
Placement of port confirmed via chest x-ray. Okay to access as per MD orders. Port accessed using sterile technique. 20gauge needle placed, +blood return. Sterile dressing applied to site. Patient tolerated well. Specimens collected and sent to lab. Maintenance fluids running on pump to keep line patent. Stretcher locked and in lowest position, appropriate side rails up. Pt instructed to notify RN if assistance is needed.

## 2023-07-22 NOTE — ED PROVIDER NOTE - ATTENDING CONTRIBUTION TO CARE
Hx: malaise, fatigue, here for clotted fistula, last dialysis Wed.  No cp, sob.      PE: well appearing, nontoxic, no respiratory distress.  No thrill overlying LUE fistula.  Clear lungs.  Ab soft, nt.    MDM: likely thrombosed fistula, check cbc r/o anemia or leukocytosis, check bmp to r/o metabolic derangement and lyte imbalance, vascular u/s, vascular surgeon consult.    Progress Note 12am: vascular surgeon consulted, recommends IR intervention, pt requires inpatient for urgent dialysis in setting of clotted fistula, will temporize hyperkalemia (no ekg changes) and admit to tele. Stable for admission. Hyperkalemia cocktail ordered.

## 2023-07-22 NOTE — CONSULT NOTE ADULT - SUBJECTIVE AND OBJECTIVE BOX
SURGERY CONSULT NOTE    HPI:  62 year old female with history of myelofibrosis, polycythemia vera, ESRD on MWF schedule via LUE AVF (last dialysis W), portal HTN c/b IGV1, subclavian port for transfusions, gastric varices, gallstones, ERCP 2022 s/p cholecystectomy with txp surgery. Patient now presented to ED after not being able to obtain dialysis on Friday. Usually receives dialysis MWF, last session Wednesday. AVF created in  with Dr. Roselyn RINCON AVF with basilic vein transposition.     PAST MEDICAL & SURGICAL HISTORY:  Polycythemia vera  and secondary myelofibrosis      Peptic ulcer disease      Hypertension      Splenomegaly        Splenic infarct  treated conservatively 2015      Pancreatic cyst      History of myelofibrosis      History of myelofibrosis      Peritoneal dialysis catheter in place  Placed 2017      Hemoperitoneum as complication of peritoneal dialysis  s/p removal       AV fistula  Placed           MEDICATIONS  (STANDING):    MEDICATIONS  (PRN):      Allergies    No Known Allergies    Intolerances        SOCIAL HISTORY:    FAMILY HISTORY:  Family history of hypertension in mother    Family history of malignant neoplasm (Grandparent)  head and neck in father    FH: cirrhosis (Sibling)        Physical Exam:  General: NAD, resting comfortably  HEENT: NC/AT, EOMI, normal hearing, no oral lesions, no LAD, neck supple  Pulmonary: normal resp effort, CTA-B  Cardiovascular: NSR, no murmurs  Abdominal: soft, ND/NT, no organomegaly  Extremities: WWP, normal strength, no clubbing/cyanosis/edema  Neuro: A/O x 3, CNs II-XII grossly intact, normal sensation, no focal deficits  Pulses: palpable distal pulses    Vital Signs Last 24 Hrs  T(C): 36.6 (2023 20:19), Max: 36.9 (2023 17:29)  T(F): 97.9 (2023 20:19), Max: 98.4 (2023 17:29)  HR: 77 (2023 20:19) (72 - 77)  BP: 133/73 (2023 20:19) (127/77 - 133/73)  BP(mean): --  RR: 18 (2023 20:19) (18 - 18)  SpO2: 100% (2023 20:19) (100% - 100%)    Parameters below as of 2023 20:19  Patient On (Oxygen Delivery Method): room air        I&O's Summary          LABS:            Urinalysis Basic - ( 2023 20:23 )    Color: Yellow / Appearance: Clear / S.009 / pH: x  Gluc: x / Ketone: Negative  / Bili: Negative / Urobili: Negative   Blood: x / Protein: 100 mg/dl / Nitrite: Negative   Leuk Esterase: Negative / RBC: 2 /hpf / WBC 1 /HPF   Sq Epi: x / Non Sq Epi: x / Bacteria: Negative      CAPILLARY BLOOD GLUCOSE            Cultures:      RADIOLOGY & ADDITIONAL STUDIES:      Plan:           SURGERY CONSULT NOTE    HPI:  62 year old female with history of myelofibrosis, polycythemia vera, ESRD on MWF schedule via LUE AVF (last dialysis W), portal HTN c/b IGV1, subclavian port for transfusions, gastric varices, gallstones, ERCP 2022 s/p cholecystectomy with txp surgery. Presented to  ED after not being able to obtain dialysis on Friday. Usually receives dialysis MWF, last session Wednesday. AVF created in  with Dr. Roselyn RINCON AVF with basilic vein transposition. Hx of  episodes of thrombosis of AVF thorugh last few years, treated by IR with mechanical with plasty and stent placement and medical thrombectomies- most recent in  and . Good motor and sensation in the arm, palpable ulnar, radial, and can feel pulse but no thrill over fistula site. Reports some episode soft tingling during dialysis. ED obtained a duplex of fistula with a noted occlusive thrombosis of the left upper extremity AV fistula and outflow stent.    PAST MEDICAL & SURGICAL HISTORY:  Polycythemia vera  and secondary myelofibrosis      Peptic ulcer disease      Hypertension      Splenomegaly        Splenic infarct  treated conservatively 2015      Pancreatic cyst      History of myelofibrosis      History of myelofibrosis      Peritoneal dialysis catheter in place  Placed 2017      Hemoperitoneum as complication of peritoneal dialysis  s/p removal       AV fistula  Placed           MEDICATIONS  (STANDING):    MEDICATIONS  (PRN):      Allergies    No Known Allergies    Intolerances        SOCIAL HISTORY:    FAMILY HISTORY:  Family history of hypertension in mother    Family history of malignant neoplasm (Grandparent)  head and neck in father    FH: cirrhosis (Sibling)        Physical Exam:  General: NAD, resting comfortably  HEENT: NC/AT, EOMI, normal hearing, no oral lesions, no LAD, neck supple  Pulmonary: normal resp effort, CTA-B  Cardiovascular: NSR, no murmurs  Abdominal: soft, ND/NT, no organomegaly  Extremities: motor 5/5 LUE  sensation intact  mild tingling in LUE  radial and ulnar pulses intact   pulse noted over fistula    Vital Signs Last 24 Hrs  T(C): 36.6 (2023 20:19), Max: 36.9 (2023 17:29)  T(F): 97.9 (2023 20:19), Max: 98.4 (2023 17:29)  HR: 77 (2023 20:19) (72 - 77)  BP: 133/73 (2023 20:19) (127/77 - 133/73)  BP(mean): --  RR: 18 (2023 20:19) (18 - 18)  SpO2: 100% (2023 20:19) (100% - 100%)    Parameters below as of 2023 20:19  Patient On (Oxygen Delivery Method): room air        I&O's Summary          LABS:            Urinalysis Basic - ( 2023 20:23 )    Color: Yellow / Appearance: Clear / S.009 / pH: x  Gluc: x / Ketone: Negative  / Bili: Negative / Urobili: Negative   Blood: x / Protein: 100 mg/dl / Nitrite: Negative   Leuk Esterase: Negative / RBC: 2 /hpf / WBC 1 /HPF   Sq Epi: x / Non Sq Epi: x / Bacteria: Negative      CAPILLARY BLOOD GLUCOSE            Cultures:      RADIOLOGY & ADDITIONAL STUDIES:  < from: VA Duplex Upper Extrem Arterial Limited, Left (23 @ 21:26) >    INTERPRETATION:  CLINICAL INFORMATION: Evaluate for left upper extremity   fistula.    COMPARISON: Upper extremity duplex left 2021.    TECHNIQUE: Duplex sonography of the LEFT UPPER extremity veins with color   and spectral Doppler, with and without compression.    FINDINGS:    Left AV fistula. No palpable thrill.    Brachial artery shows multiphasic flow before and after anastomosis.  Left AV fistula and outflow stent show evidence of thrombosis.  There is central venous flow distal to stent.    IMPRESSION:  Occlusive thrombosis of the left upper extremity AV fistula and outflow   stent.    Dr. Rojas discussed these findings with Dr. Covington on 2023 9:36 PM   with read back.    --- End of Report ---    < end of copied text >        Plan:  62 year old female with history of myelofibrosis, polycythemia vera, ESRD on MWF schedule via LUE AVF (last dialysis W), portal HTN c/b IGV1, subclavian port for transfusions, gastric varices, gallstones, ERCP 2022 s/p cholecystectomy with txp surgery .AVF created in 2017 with Dr. Roselyn RINCON AVF with basilic vein transposition. Hx of thrombosis of AVF through last few years, treated by IR with mechanical with plasty and stent placement and medical thrombectomies- most recent in 2019 and  ([facility]).     - if patient requires admission, recommend IR consult for thrombectomy  - pain control  - vascular to follow    d/w Dr. Pyle, on behalf of Dr. Dempsey    Vascular, [id number]

## 2023-07-23 ENCOUNTER — TRANSCRIPTION ENCOUNTER (OUTPATIENT)
Age: 63
End: 2023-07-23

## 2023-07-23 DIAGNOSIS — E87.5 HYPERKALEMIA: ICD-10-CM

## 2023-07-23 DIAGNOSIS — D64.9 ANEMIA, UNSPECIFIED: ICD-10-CM

## 2023-07-23 DIAGNOSIS — D69.6 THROMBOCYTOPENIA, UNSPECIFIED: ICD-10-CM

## 2023-07-23 DIAGNOSIS — N18.6 END STAGE RENAL DISEASE: ICD-10-CM

## 2023-07-23 DIAGNOSIS — D75.81 MYELOFIBROSIS: ICD-10-CM

## 2023-07-23 DIAGNOSIS — T82.898A OTHER SPECIFIED COMPLICATION OF VASCULAR PROSTHETIC DEVICES, IMPLANTS AND GRAFTS, INITIAL ENCOUNTER: ICD-10-CM

## 2023-07-23 DIAGNOSIS — R73.9 HYPERGLYCEMIA, UNSPECIFIED: ICD-10-CM

## 2023-07-23 DIAGNOSIS — D75.1 SECONDARY POLYCYTHEMIA: ICD-10-CM

## 2023-07-23 LAB
A1C WITH ESTIMATED AVERAGE GLUCOSE RESULT: 5.5 % — SIGNIFICANT CHANGE UP (ref 4–5.6)
ALBUMIN SERPL ELPH-MCNC: 3.8 G/DL — SIGNIFICANT CHANGE UP (ref 3.3–5)
ALP SERPL-CCNC: 68 U/L — SIGNIFICANT CHANGE UP (ref 40–120)
ALT FLD-CCNC: 11 U/L — SIGNIFICANT CHANGE UP (ref 10–45)
ANION GAP SERPL CALC-SCNC: 12 MMOL/L — SIGNIFICANT CHANGE UP (ref 5–17)
ANION GAP SERPL CALC-SCNC: 13 MMOL/L — SIGNIFICANT CHANGE UP (ref 5–17)
ANION GAP SERPL CALC-SCNC: 16 MMOL/L — SIGNIFICANT CHANGE UP (ref 5–17)
ANISOCYTOSIS BLD QL: SLIGHT — SIGNIFICANT CHANGE UP
APTT BLD: 23.7 SEC — LOW (ref 27.5–35.5)
AST SERPL-CCNC: 14 U/L — SIGNIFICANT CHANGE UP (ref 10–40)
BASOPHILS # BLD AUTO: 0.04 K/UL — SIGNIFICANT CHANGE UP (ref 0–0.2)
BASOPHILS NFR BLD AUTO: 0.9 % — SIGNIFICANT CHANGE UP (ref 0–2)
BILIRUB SERPL-MCNC: 1.2 MG/DL — SIGNIFICANT CHANGE UP (ref 0.2–1.2)
BLD GP AB SCN SERPL QL: NEGATIVE — SIGNIFICANT CHANGE UP
BLD GP AB SCN SERPL QL: NEGATIVE — SIGNIFICANT CHANGE UP
BUN SERPL-MCNC: 44 MG/DL — HIGH (ref 7–23)
BUN SERPL-MCNC: 44 MG/DL — HIGH (ref 7–23)
BUN SERPL-MCNC: 45 MG/DL — HIGH (ref 7–23)
CALCIUM SERPL-MCNC: 8.1 MG/DL — LOW (ref 8.4–10.5)
CALCIUM SERPL-MCNC: 8.3 MG/DL — LOW (ref 8.4–10.5)
CALCIUM SERPL-MCNC: 8.9 MG/DL — SIGNIFICANT CHANGE UP (ref 8.4–10.5)
CHLORIDE SERPL-SCNC: 101 MMOL/L — SIGNIFICANT CHANGE UP (ref 96–108)
CHLORIDE SERPL-SCNC: 103 MMOL/L — SIGNIFICANT CHANGE UP (ref 96–108)
CHLORIDE SERPL-SCNC: 105 MMOL/L — SIGNIFICANT CHANGE UP (ref 96–108)
CO2 SERPL-SCNC: 18 MMOL/L — LOW (ref 22–31)
CO2 SERPL-SCNC: 21 MMOL/L — LOW (ref 22–31)
CO2 SERPL-SCNC: 21 MMOL/L — LOW (ref 22–31)
CREAT SERPL-MCNC: 9.52 MG/DL — HIGH (ref 0.5–1.3)
CREAT SERPL-MCNC: 9.67 MG/DL — HIGH (ref 0.5–1.3)
CREAT SERPL-MCNC: 9.84 MG/DL — HIGH (ref 0.5–1.3)
DACRYOCYTES BLD QL SMEAR: SLIGHT — SIGNIFICANT CHANGE UP
EGFR: 4 ML/MIN/1.73M2 — LOW
EOSINOPHIL # BLD AUTO: 0 K/UL — SIGNIFICANT CHANGE UP (ref 0–0.5)
EOSINOPHIL NFR BLD AUTO: 0 % — SIGNIFICANT CHANGE UP (ref 0–6)
ESTIMATED AVERAGE GLUCOSE: 111 MG/DL — SIGNIFICANT CHANGE UP (ref 68–114)
GLUCOSE BLDC GLUCOMTR-MCNC: 126 MG/DL — HIGH (ref 70–99)
GLUCOSE BLDC GLUCOMTR-MCNC: 141 MG/DL — HIGH (ref 70–99)
GLUCOSE BLDC GLUCOMTR-MCNC: 173 MG/DL — HIGH (ref 70–99)
GLUCOSE BLDC GLUCOMTR-MCNC: 176 MG/DL — HIGH (ref 70–99)
GLUCOSE BLDC GLUCOMTR-MCNC: 80 MG/DL — SIGNIFICANT CHANGE UP (ref 70–99)
GLUCOSE SERPL-MCNC: 140 MG/DL — HIGH (ref 70–99)
GLUCOSE SERPL-MCNC: 140 MG/DL — HIGH (ref 70–99)
GLUCOSE SERPL-MCNC: 192 MG/DL — HIGH (ref 70–99)
HCT VFR BLD CALC: 20.1 % — CRITICAL LOW (ref 34.5–45)
HCT VFR BLD CALC: 20.2 % — CRITICAL LOW (ref 34.5–45)
HCT VFR BLD CALC: 21.1 % — LOW (ref 34.5–45)
HCT VFR BLD CALC: 22.7 % — LOW (ref 34.5–45)
HGB BLD-MCNC: 6.3 G/DL — CRITICAL LOW (ref 11.5–15.5)
HGB BLD-MCNC: 6.3 G/DL — CRITICAL LOW (ref 11.5–15.5)
HGB BLD-MCNC: 6.8 G/DL — CRITICAL LOW (ref 11.5–15.5)
HGB BLD-MCNC: 7.1 G/DL — LOW (ref 11.5–15.5)
INR BLD: 1.1 RATIO — SIGNIFICANT CHANGE UP (ref 0.88–1.16)
LYMPHOCYTES # BLD AUTO: 0.35 K/UL — LOW (ref 1–3.3)
LYMPHOCYTES # BLD AUTO: 8 % — LOW (ref 13–44)
MACROCYTES BLD QL: SLIGHT — SIGNIFICANT CHANGE UP
MAGNESIUM SERPL-MCNC: 2.6 MG/DL — SIGNIFICANT CHANGE UP (ref 1.6–2.6)
MANUAL SMEAR VERIFICATION: SIGNIFICANT CHANGE UP
MCHC RBC-ENTMCNC: 30.4 PG — SIGNIFICANT CHANGE UP (ref 27–34)
MCHC RBC-ENTMCNC: 30.5 PG — SIGNIFICANT CHANGE UP (ref 27–34)
MCHC RBC-ENTMCNC: 31 PG — SIGNIFICANT CHANGE UP (ref 27–34)
MCHC RBC-ENTMCNC: 31 PG — SIGNIFICANT CHANGE UP (ref 27–34)
MCHC RBC-ENTMCNC: 31.2 GM/DL — LOW (ref 32–36)
MCHC RBC-ENTMCNC: 31.3 GM/DL — LOW (ref 32–36)
MCHC RBC-ENTMCNC: 31.3 GM/DL — LOW (ref 32–36)
MCHC RBC-ENTMCNC: 32.2 GM/DL — SIGNIFICANT CHANGE UP (ref 32–36)
MCV RBC AUTO: 94.2 FL — SIGNIFICANT CHANGE UP (ref 80–100)
MCV RBC AUTO: 97.4 FL — SIGNIFICANT CHANGE UP (ref 80–100)
MCV RBC AUTO: 99 FL — SIGNIFICANT CHANGE UP (ref 80–100)
MCV RBC AUTO: 99.5 FL — SIGNIFICANT CHANGE UP (ref 80–100)
METAMYELOCYTES # FLD: 1.8 % — HIGH (ref 0–0)
MONOCYTES # BLD AUTO: 0.15 K/UL — SIGNIFICANT CHANGE UP (ref 0–0.9)
MONOCYTES NFR BLD AUTO: 3.5 % — SIGNIFICANT CHANGE UP (ref 2–14)
MRSA PCR RESULT.: SIGNIFICANT CHANGE UP
NEUTROPHILS # BLD AUTO: 3.72 K/UL — SIGNIFICANT CHANGE UP (ref 1.8–7.4)
NEUTROPHILS NFR BLD AUTO: 80.5 % — HIGH (ref 43–77)
NEUTS BAND # BLD: 5.3 % — SIGNIFICANT CHANGE UP (ref 0–8)
NRBC # BLD: 0 /100 WBCS — SIGNIFICANT CHANGE UP (ref 0–0)
NRBC # BLD: 1 /100 WBCS — HIGH (ref 0–0)
NRBC # BLD: 2 /100 WBCS — HIGH (ref 0–0)
NRBC # BLD: 2 /100 — HIGH (ref 0–0)
NT-PROBNP SERPL-SCNC: 2374 PG/ML — HIGH (ref 0–300)
OVALOCYTES BLD QL SMEAR: SLIGHT — SIGNIFICANT CHANGE UP
PHOSPHATE SERPL-MCNC: 3.3 MG/DL — SIGNIFICANT CHANGE UP (ref 2.5–4.5)
PLAT MORPH BLD: NORMAL — SIGNIFICANT CHANGE UP
PLATELET # BLD AUTO: 65 K/UL — LOW (ref 150–400)
PLATELET # BLD AUTO: 71 K/UL — LOW (ref 150–400)
PLATELET # BLD AUTO: 72 K/UL — LOW (ref 150–400)
PLATELET # BLD AUTO: 75 K/UL — LOW (ref 150–400)
PLATELET COUNT - ESTIMATE: ABNORMAL
POIKILOCYTOSIS BLD QL AUTO: SIGNIFICANT CHANGE UP
POLYCHROMASIA BLD QL SMEAR: SLIGHT — SIGNIFICANT CHANGE UP
POTASSIUM SERPL-MCNC: 4.8 MMOL/L — SIGNIFICANT CHANGE UP (ref 3.5–5.3)
POTASSIUM SERPL-MCNC: 4.9 MMOL/L — SIGNIFICANT CHANGE UP (ref 3.5–5.3)
POTASSIUM SERPL-MCNC: 5.3 MMOL/L — SIGNIFICANT CHANGE UP (ref 3.5–5.3)
POTASSIUM SERPL-SCNC: 4.8 MMOL/L — SIGNIFICANT CHANGE UP (ref 3.5–5.3)
POTASSIUM SERPL-SCNC: 4.9 MMOL/L — SIGNIFICANT CHANGE UP (ref 3.5–5.3)
POTASSIUM SERPL-SCNC: 5.3 MMOL/L — SIGNIFICANT CHANGE UP (ref 3.5–5.3)
PROT SERPL-MCNC: 6.7 G/DL — SIGNIFICANT CHANGE UP (ref 6–8.3)
PROTHROM AB SERPL-ACNC: 12.7 SEC — SIGNIFICANT CHANGE UP (ref 10.5–13.4)
RBC # BLD: 2.03 M/UL — LOW (ref 3.8–5.2)
RBC # BLD: 2.03 M/UL — LOW (ref 3.8–5.2)
RBC # BLD: 2.24 M/UL — LOW (ref 3.8–5.2)
RBC # BLD: 2.33 M/UL — LOW (ref 3.8–5.2)
RBC # FLD: 18.5 % — HIGH (ref 10.3–14.5)
RBC # FLD: 18.6 % — HIGH (ref 10.3–14.5)
RBC # FLD: 18.7 % — HIGH (ref 10.3–14.5)
RBC # FLD: 19.1 % — HIGH (ref 10.3–14.5)
RBC BLD AUTO: ABNORMAL
RH IG SCN BLD-IMP: POSITIVE — SIGNIFICANT CHANGE UP
RH IG SCN BLD-IMP: POSITIVE — SIGNIFICANT CHANGE UP
S AUREUS DNA NOSE QL NAA+PROBE: SIGNIFICANT CHANGE UP
SODIUM SERPL-SCNC: 135 MMOL/L — SIGNIFICANT CHANGE UP (ref 135–145)
SODIUM SERPL-SCNC: 136 MMOL/L — SIGNIFICANT CHANGE UP (ref 135–145)
SODIUM SERPL-SCNC: 139 MMOL/L — SIGNIFICANT CHANGE UP (ref 135–145)
SPHEROCYTES BLD QL SMEAR: SLIGHT — SIGNIFICANT CHANGE UP
WBC # BLD: 4.08 K/UL — SIGNIFICANT CHANGE UP (ref 3.8–10.5)
WBC # BLD: 4.34 K/UL — SIGNIFICANT CHANGE UP (ref 3.8–10.5)
WBC # BLD: 4.68 K/UL — SIGNIFICANT CHANGE UP (ref 3.8–10.5)
WBC # BLD: 4.96 K/UL — SIGNIFICANT CHANGE UP (ref 3.8–10.5)
WBC # FLD AUTO: 4.08 K/UL — SIGNIFICANT CHANGE UP (ref 3.8–10.5)
WBC # FLD AUTO: 4.34 K/UL — SIGNIFICANT CHANGE UP (ref 3.8–10.5)
WBC # FLD AUTO: 4.68 K/UL — SIGNIFICANT CHANGE UP (ref 3.8–10.5)
WBC # FLD AUTO: 4.96 K/UL — SIGNIFICANT CHANGE UP (ref 3.8–10.5)

## 2023-07-23 PROCEDURE — 99254 IP/OBS CNSLTJ NEW/EST MOD 60: CPT | Mod: GC

## 2023-07-23 PROCEDURE — 99223 1ST HOSP IP/OBS HIGH 75: CPT

## 2023-07-23 PROCEDURE — 93010 ELECTROCARDIOGRAM REPORT: CPT

## 2023-07-23 RX ORDER — DANAZOL 200 MG/1
200 CAPSULE ORAL THREE TIMES A DAY
Refills: 0 | Status: DISCONTINUED | OUTPATIENT
Start: 2023-07-23 | End: 2023-07-27

## 2023-07-23 RX ORDER — DEXTROSE 50 % IN WATER 50 %
15 SYRINGE (ML) INTRAVENOUS ONCE
Refills: 0 | Status: DISCONTINUED | OUTPATIENT
Start: 2023-07-23 | End: 2023-07-24

## 2023-07-23 RX ORDER — FUROSEMIDE 40 MG
80 TABLET ORAL ONCE
Refills: 0 | Status: COMPLETED | OUTPATIENT
Start: 2023-07-23 | End: 2023-07-23

## 2023-07-23 RX ORDER — INSULIN LISPRO 100/ML
VIAL (ML) SUBCUTANEOUS
Refills: 0 | Status: DISCONTINUED | OUTPATIENT
Start: 2023-07-23 | End: 2023-07-24

## 2023-07-23 RX ORDER — DEXTROSE 50 % IN WATER 50 %
50 SYRINGE (ML) INTRAVENOUS ONCE
Refills: 0 | Status: COMPLETED | OUTPATIENT
Start: 2023-07-23 | End: 2023-07-23

## 2023-07-23 RX ORDER — ONDANSETRON 8 MG/1
4 TABLET, FILM COATED ORAL EVERY 8 HOURS
Refills: 0 | Status: DISCONTINUED | OUTPATIENT
Start: 2023-07-23 | End: 2023-07-27

## 2023-07-23 RX ORDER — SODIUM ZIRCONIUM CYCLOSILICATE 10 G/10G
10 POWDER, FOR SUSPENSION ORAL ONCE
Refills: 0 | Status: COMPLETED | OUTPATIENT
Start: 2023-07-23 | End: 2023-07-23

## 2023-07-23 RX ORDER — CHLORHEXIDINE GLUCONATE 213 G/1000ML
1 SOLUTION TOPICAL
Refills: 0 | Status: DISCONTINUED | OUTPATIENT
Start: 2023-07-23 | End: 2023-07-27

## 2023-07-23 RX ORDER — LANOLIN ALCOHOL/MO/W.PET/CERES
3 CREAM (GRAM) TOPICAL AT BEDTIME
Refills: 0 | Status: DISCONTINUED | OUTPATIENT
Start: 2023-07-23 | End: 2023-07-27

## 2023-07-23 RX ORDER — GABAPENTIN 400 MG/1
100 CAPSULE ORAL DAILY
Refills: 0 | Status: DISCONTINUED | OUTPATIENT
Start: 2023-07-23 | End: 2023-07-27

## 2023-07-23 RX ORDER — INSULIN HUMAN 100 [IU]/ML
5 INJECTION, SOLUTION SUBCUTANEOUS ONCE
Refills: 0 | Status: DISCONTINUED | OUTPATIENT
Start: 2023-07-23 | End: 2023-07-23

## 2023-07-23 RX ORDER — CALCIUM GLUCONATE 100 MG/ML
1 VIAL (ML) INTRAVENOUS ONCE
Refills: 0 | Status: COMPLETED | OUTPATIENT
Start: 2023-07-23 | End: 2023-07-23

## 2023-07-23 RX ORDER — SODIUM CHLORIDE 9 MG/ML
1000 INJECTION, SOLUTION INTRAVENOUS
Refills: 0 | Status: DISCONTINUED | OUTPATIENT
Start: 2023-07-23 | End: 2023-07-24

## 2023-07-23 RX ORDER — SODIUM CHLORIDE 9 MG/ML
1000 INJECTION INTRAMUSCULAR; INTRAVENOUS; SUBCUTANEOUS
Refills: 0 | Status: DISCONTINUED | OUTPATIENT
Start: 2023-07-23 | End: 2023-07-26

## 2023-07-23 RX ORDER — DEXTROSE 50 % IN WATER 50 %
12.5 SYRINGE (ML) INTRAVENOUS ONCE
Refills: 0 | Status: DISCONTINUED | OUTPATIENT
Start: 2023-07-23 | End: 2023-07-24

## 2023-07-23 RX ORDER — SODIUM ZIRCONIUM CYCLOSILICATE 10 G/10G
10 POWDER, FOR SUSPENSION ORAL THREE TIMES A DAY
Refills: 0 | Status: DISCONTINUED | OUTPATIENT
Start: 2023-07-23 | End: 2023-07-23

## 2023-07-23 RX ORDER — INSULIN LISPRO 100/ML
VIAL (ML) SUBCUTANEOUS AT BEDTIME
Refills: 0 | Status: DISCONTINUED | OUTPATIENT
Start: 2023-07-23 | End: 2023-07-24

## 2023-07-23 RX ORDER — PANTOPRAZOLE SODIUM 20 MG/1
40 TABLET, DELAYED RELEASE ORAL
Refills: 0 | Status: DISCONTINUED | OUTPATIENT
Start: 2023-07-23 | End: 2023-07-27

## 2023-07-23 RX ORDER — ACETAMINOPHEN 500 MG
650 TABLET ORAL EVERY 6 HOURS
Refills: 0 | Status: DISCONTINUED | OUTPATIENT
Start: 2023-07-23 | End: 2023-07-27

## 2023-07-23 RX ORDER — CARVEDILOL PHOSPHATE 80 MG/1
3.12 CAPSULE, EXTENDED RELEASE ORAL EVERY 12 HOURS
Refills: 0 | Status: DISCONTINUED | OUTPATIENT
Start: 2023-07-23 | End: 2023-07-27

## 2023-07-23 RX ORDER — GLUCAGON INJECTION, SOLUTION 0.5 MG/.1ML
1 INJECTION, SOLUTION SUBCUTANEOUS ONCE
Refills: 0 | Status: DISCONTINUED | OUTPATIENT
Start: 2023-07-23 | End: 2023-07-24

## 2023-07-23 RX ORDER — INSULIN HUMAN 100 [IU]/ML
5 INJECTION, SOLUTION SUBCUTANEOUS ONCE
Refills: 0 | Status: COMPLETED | OUTPATIENT
Start: 2023-07-23 | End: 2023-07-23

## 2023-07-23 RX ORDER — SODIUM ZIRCONIUM CYCLOSILICATE 10 G/10G
10 POWDER, FOR SUSPENSION ORAL THREE TIMES A DAY
Refills: 0 | Status: COMPLETED | OUTPATIENT
Start: 2023-07-23 | End: 2023-07-24

## 2023-07-23 RX ORDER — DEXTROSE 50 % IN WATER 50 %
25 SYRINGE (ML) INTRAVENOUS ONCE
Refills: 0 | Status: DISCONTINUED | OUTPATIENT
Start: 2023-07-23 | End: 2023-07-24

## 2023-07-23 RX ADMIN — Medication 3 MILLIGRAM(S): at 22:18

## 2023-07-23 RX ADMIN — Medication 50 MILLILITER(S): at 13:43

## 2023-07-23 RX ADMIN — INSULIN HUMAN 5 UNIT(S): 100 INJECTION, SOLUTION SUBCUTANEOUS at 13:55

## 2023-07-23 RX ADMIN — Medication 100 GRAM(S): at 03:49

## 2023-07-23 RX ADMIN — SODIUM CHLORIDE 20 MILLILITER(S): 9 INJECTION INTRAMUSCULAR; INTRAVENOUS; SUBCUTANEOUS at 02:00

## 2023-07-23 RX ADMIN — SODIUM CHLORIDE 20 MILLILITER(S): 9 INJECTION INTRAMUSCULAR; INTRAVENOUS; SUBCUTANEOUS at 00:56

## 2023-07-23 RX ADMIN — Medication 80 MILLIGRAM(S): at 14:40

## 2023-07-23 RX ADMIN — SODIUM CHLORIDE 20 MILLILITER(S): 9 INJECTION INTRAMUSCULAR; INTRAVENOUS; SUBCUTANEOUS at 11:54

## 2023-07-23 RX ADMIN — PANTOPRAZOLE SODIUM 40 MILLIGRAM(S): 20 TABLET, DELAYED RELEASE ORAL at 05:31

## 2023-07-23 RX ADMIN — SODIUM ZIRCONIUM CYCLOSILICATE 10 GRAM(S): 10 POWDER, FOR SUSPENSION ORAL at 22:17

## 2023-07-23 RX ADMIN — DANAZOL 200 MILLIGRAM(S): 200 CAPSULE ORAL at 22:17

## 2023-07-23 RX ADMIN — SODIUM ZIRCONIUM CYCLOSILICATE 10 GRAM(S): 10 POWDER, FOR SUSPENSION ORAL at 00:32

## 2023-07-23 RX ADMIN — CARVEDILOL PHOSPHATE 3.12 MILLIGRAM(S): 80 CAPSULE, EXTENDED RELEASE ORAL at 17:08

## 2023-07-23 RX ADMIN — GABAPENTIN 100 MILLIGRAM(S): 400 CAPSULE ORAL at 22:18

## 2023-07-23 RX ADMIN — CHLORHEXIDINE GLUCONATE 1 APPLICATION(S): 213 SOLUTION TOPICAL at 13:52

## 2023-07-23 RX ADMIN — Medication 1 TABLET(S): at 11:54

## 2023-07-23 RX ADMIN — DANAZOL 200 MILLIGRAM(S): 200 CAPSULE ORAL at 14:12

## 2023-07-23 RX ADMIN — SODIUM ZIRCONIUM CYCLOSILICATE 10 GRAM(S): 10 POWDER, FOR SUSPENSION ORAL at 13:41

## 2023-07-23 RX ADMIN — DANAZOL 200 MILLIGRAM(S): 200 CAPSULE ORAL at 05:30

## 2023-07-23 RX ADMIN — CARVEDILOL PHOSPHATE 3.12 MILLIGRAM(S): 80 CAPSULE, EXTENDED RELEASE ORAL at 05:29

## 2023-07-23 NOTE — PROGRESS NOTE ADULT - PROBLEM SELECTOR PLAN 3
no active bleeding , required transfusions in past and h/o iron overload , will recheck CBC to confirm , if remains below 7 on repeat can transfuse 1 U PRBCS   - f/u repeat CBC   - transfuse PRBC 1 U if hgb < 7 , no active bleeding , required transfusions in past and h/o iron overload , will recheck CBC to confirm , if remains below 7 on repeat can transfuse 1 U PRBCS   - transfuse PRBC 1 U if hgb < 7   - s/p 1U pRBC, repeat CBC 7.1     - CBC 8PM if <7 transfuse

## 2023-07-23 NOTE — DISCHARGE NOTE PROVIDER - NSDCMRMEDTOKEN_GEN_ALL_CORE_FT
acetaminophen 325 mg oral tablet: 2 tab(s) orally every 6 hours, As Needed -for fever - for moderate pain - for mild pain   carvedilol 3.125 mg oral tablet: 1 tab(s) orally every 12 hours  danazol 200 mg oral capsule: 1 cap(s) orally 3 times a day  epoetin filiberto: 35491 unit(s) intravenous Monday, Wednesday, and Friday with dialysis as per nephrology  gabapentin 100 mg oral capsule: 1 cap(s) orally once a day  Jakafi 15 mg oral tablet: 1 tab(s) orally Monday, Wednesday, and Friday after dialysis  Multiple Vitamins oral tablet: 1 tab(s) orally once a day  Nephro-Ben oral tablet: 1 tab(s) orally once a day  Protonix 40 mg oral granule, delayed release: 1 each orally once a day

## 2023-07-23 NOTE — DISCHARGE NOTE PROVIDER - NSDCFUSCHEDAPPT_GEN_ALL_CORE_FT
[facility]  [facility]  Scheduled Appointment: 07/24/2023    Jacky Guo  [facility]  Scheduled Appointment: 07/24/2023    Gabriel Frederick  [facility]  ENDOVASCULAR [address]   Scheduled Appointment: 08/17/2023    Jocelyne Jean  [facility]  OPHTHALM [address] [place] [address]  Scheduled Appointment: 09/01/2023     Gabriel Frederick  [facility]  ENDOVASCULAR [address]   Scheduled Appointment: 08/17/2023    Jocelyne Jean  [facility]  OPHTHALM [address] [place] [address]  Scheduled Appointment: 09/01/2023     Gabriel Frederick  [facility]  ENDOVASCULAR [address]   Scheduled Appointment: 08/17/2023    [facility]  [facility]  Scheduled Appointment: 08/31/2023    Jacky Guo  [facility]  [facility]  Scheduled Appointment: 08/31/2023    Jocelyne Jean  [facility]  OPHTHALM [address] [place] [address]  Scheduled Appointment: 09/01/2023

## 2023-07-23 NOTE — CONSULT NOTE ADULT - PROBLEM SELECTOR RECOMMENDATION 9
Pt has no HD access and missed her last dialysis session.  She is non - oliguric and is not in any distress.   IR consulted for the Vascular access.   As patient is not in any acute distress, no need for urgent HD.  Anemic - got one unit of pRBC.  We can resume her regular HD schedule once the access is established.  Renally dose all the medications and avoid nephrotoxic medications as much as possible. Pt has no HD access and missed her last dialysis session.  She is non - oliguric and is not in any distress.   IR consulted for the Vascular access thrombectomy.   As patient is not in any acute distress, no need for urgent HD.  Anemic - got one unit of pRBC.  We can resume her regular HD schedule once the access is established.  Renally dose all the medications and avoid nephrotoxic medications as much as possible.

## 2023-07-23 NOTE — DISCHARGE NOTE PROVIDER - ATTENDING DISCHARGE PHYSICAL EXAMINATION:
Patient seen and examined. Feeling well.   Vital Signs Last 24 Hrs  T(C): 36.9 (27 Jul 2023 12:06), Max: 36.9 (27 Jul 2023 12:06)  T(F): 98.4 (27 Jul 2023 12:06), Max: 98.4 (27 Jul 2023 12:06)  HR: 74 (27 Jul 2023 12:06) (70 - 86)  BP: 115/72 (27 Jul 2023 12:06) (95/60 - 115/72)  RR: 18 (27 Jul 2023 12:06) (18 - 18)  SpO2: 96% (27 Jul 2023 12:06) (95% - 98%)    Parameters below as of 27 Jul 2023 12:06  Patient On (Oxygen Delivery Method): room air    CONSTITUTIONAL: NAD, well-developed   EYES: PERRLA; conjunctiva and sclera clear  ENMT: Moist oral mucosa, no pharyngeal injection or exudates   NECK: Supple   RESPIRATORY: Normal respiratory effort; lungs are clear to auscultation bilaterally  CARDIOVASCULAR: Regular rate and rhythm, normal S1 and S2, no murmur   ABDOMEN: Nontender to palpation, normoactive bowel sounds   MUSCULOSKELETAL: no clubbing or cyanosis of digits; no joint swelling or tenderness to palpation  PSYCH: A+O to person, place, and time; affect appropriate  NEUROLOGY: no gross sensory deficits   SKIN: No rashes

## 2023-07-23 NOTE — CONSULT NOTE ADULT - SUBJECTIVE AND OBJECTIVE BOX
[facility] [place] DIVISION OF KIDNEY DISEASES AND HYPERTENSION -- [phone]  -- INITIAL CONSULT NOTE  --------------------------------------------------------------------------------  HPI:63 year old female with history of myelofibrosis, polycythemia vera, ESRD on MWF schedule via LUE AVF, portal HTN c/b IGV1, subclavian port for transfusions, gastric varices, presents for clotted fistula and inability to undergo dialysis.   Patient reports last session was Wednesday 7/19. No symptoms of the acute distress.    Follows dr. De León as an out patient.  no known allergies.        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      Pancreatic cyst      History of myelofibrosis      History of myelofibrosis      Peritoneal dialysis catheter in place  Placed 8/9/2017      Hemoperitoneum as complication of peritoneal dialysis  s/p removal 9/18      AV fistula  Placed 9/18        FAMILY HISTORY:  Family history of hypertension in mother    Family history of malignant neoplasm (Grandparent)  head and neck in father    FH: cirrhosis (Sibling)      PAST SOCIAL HISTORY:    ALLERGIES & MEDICATIONS  --------------------------------------------------------------------------------  Allergies    No Known Allergies    Intolerances      Standing Inpatient Medications  carvedilol 3.125 milliGRAM(s) Oral every 12 hours  chlorhexidine 2% Cloths 1 Application(s) Topical <User Schedule>  danazol 200 milliGRAM(s) Oral three times a day  dextrose 5%. 1000 milliLiter(s) IV Continuous <Continuous>  dextrose 5%. 1000 milliLiter(s) IV Continuous <Continuous>  dextrose 50% Injectable 25 Gram(s) IV Push once  dextrose 50% Injectable 12.5 Gram(s) IV Push once  furosemide   Injectable 80 milliGRAM(s) IV Push once  gabapentin 100 milliGRAM(s) Oral daily  glucagon  Injectable 1 milliGRAM(s) IntraMuscular once  insulin lispro (ADMELOG) corrective regimen sliding scale   SubCutaneous three times a day before meals  insulin lispro (ADMELOG) corrective regimen sliding scale   SubCutaneous at bedtime  Nephro-deonna 1 Tablet(s) Oral daily  pantoprazole    Tablet 40 milliGRAM(s) Oral before breakfast  sodium chloride 0.9%. 1000 milliLiter(s) IV Continuous <Continuous>    PRN Inpatient Medications  acetaminophen     Tablet .. 650 milliGRAM(s) Oral every 6 hours PRN  dextrose Oral Gel 15 Gram(s) Oral once PRN  melatonin 3 milliGRAM(s) Oral at bedtime PRN  ondansetron Injectable 4 milliGRAM(s) IV Push every 8 hours PRN      REVIEW OF SYSTEMS  --------------------------------------------------------------------------------  Gen: No fevers/chills  Skin: No rashes  Head/Eyes/Ears: Normal hearing,   Respiratory: No dyspnea, cough  CV: No chest pain  GI: No abdominal pain, diarrhea  : No dysuria, hematuria  MSK: No  edema  Heme: No easy bruising or bleeding  Psych: No significant depression    All other systems were reviewed and are negative, except as noted.    VITALS/PHYSICAL EXAM  --------------------------------------------------------------------------------  T(C): 36.6 (07-23-23 @ 11:13), Max: 36.9 (07-22-23 @ 17:29)  HR: 78 (07-23-23 @ 11:13) (72 - 83)  BP: 127/79 (07-23-23 @ 11:13) (127/77 - 146/80)  RR: 18 (07-23-23 @ 11:13) (16 - 18)  SpO2: 98% (07-23-23 @ 11:13) (97% - 100%)  Wt(kg): --    Weight (kg): 56.7 (07-22-23 @ 17:29)      07-23-23 @ 07:01  -  07-23-23 @ 14:03  --------------------------------------------------------  IN: 480 mL / OUT: 0 mL / NET: 480 mL      Physical Exam:  	Gen: NAD  	HEENT: MMM  	Pulm: CTA B/L  	CV: S1S2  	Abd: Soft, +BS   	Ext: No LE edema B/L  	Neuro: Awake  	Skin: Warm and dry  	Vascular access: Left extremity AV fistula - No thrill noted. No bruit heard.    LABS/STUDIES  --------------------------------------------------------------------------------              7.1    4.96  >-----------<  72       [07-23-23 @ 10:39]              22.7     136  |  103  |  44  ----------------------------<  140      [07-23-23 @ 12:20]  5.3   |  21  |  9.52        Ca     8.9     [07-23-23 @ 12:20]      Mg     2.6     [07-22-23 @ 23:16]      Phos  3.3     [07-23-23 @ 04:01]    TPro  6.7  /  Alb  3.8  /  TBili  1.2  /  DBili  x   /  AST  14  /  ALT  11  /  AlkPhos  68  [07-23-23 @ 04:01]    PT/INR: PT 12.7 , INR 1.10       [07-22-23 @ 23:16]  PTT: 23.7       [07-22-23 @ 23:16]      Creatinine Trend:  SCr 9.52 [07-23 @ 12:20]  SCr 9.67 [07-23 @ 04:01]  SCr 9.53 [07-22 @ 23:16]    Urinalysis - [07-23-23 @ 12:20]      Color  / Appearance  / SG  / pH       Gluc 140 / Ketone   / Bili  / Urobili        Blood  / Protein  / Leuk Est  / Nitrite       RBC  / WBC  / Hyaline  / Gran  / Sq Epi  / Non Sq Epi  / Bacteria       Iron 163, TIBC 229, %sat 71      [12-08-22 @ 07:36]  HbA1c 4.9      [06-01-19 @ 00:46]    HBsAb 14.6      [09-23-22 @ 13:28]  HBsAb Reactive      [07-29-21 @ 00:09]  HBsAg Nonreact      [09-23-22 @ 13:28]  HBcAb Nonreact      [09-23-22 @ 13:28]  HCV 0.12, Nonreact      [09-23-22 @ 13:28]  HIV Nonreact      [04-11-21 @ 08:31]    Syphilis Screen (Treponema Pallidum Ab) Negative      [09-08-21 @ 09:08]

## 2023-07-23 NOTE — PROGRESS NOTE ADULT - SUBJECTIVE AND OBJECTIVE BOX
PATIENT: MIHYEON HU, MRN: [id number]    CHIEF COMPLAINT: Patient is a 63y old  Female who presents with a chief complaint of clotted AVF and missed HD (22 Jul 2023 23:57)      INTERVAL HISTORY/OVERNIGHT EVENTS: No overnight events.       MEDICATIONS:  MEDICATIONS  (STANDING):  carvedilol 3.125 milliGRAM(s) Oral every 12 hours  danazol 200 milliGRAM(s) Oral three times a day  dextrose 5%. 1000 milliLiter(s) (50 mL/Hr) IV Continuous <Continuous>  dextrose 5%. 1000 milliLiter(s) (100 mL/Hr) IV Continuous <Continuous>  dextrose 50% Injectable 25 Gram(s) IV Push once  dextrose 50% Injectable 12.5 Gram(s) IV Push once  gabapentin 100 milliGRAM(s) Oral daily  glucagon  Injectable 1 milliGRAM(s) IntraMuscular once  insulin lispro (ADMELOG) corrective regimen sliding scale   SubCutaneous three times a day before meals  insulin lispro (ADMELOG) corrective regimen sliding scale   SubCutaneous at bedtime  Nephro-deonna 1 Tablet(s) Oral daily  pantoprazole    Tablet 40 milliGRAM(s) Oral before breakfast  sodium chloride 0.9%. 1000 milliLiter(s) (20 mL/Hr) IV Continuous <Continuous>    MEDICATIONS  (PRN):  acetaminophen     Tablet .. 650 milliGRAM(s) Oral every 6 hours PRN Temp greater or equal to 38C (100.4F), Mild Pain (1 - 3)  dextrose Oral Gel 15 Gram(s) Oral once PRN Blood Glucose LESS THAN 70 milliGRAM(s)/deciliter  melatonin 3 milliGRAM(s) Oral at bedtime PRN Insomnia  ondansetron Injectable 4 milliGRAM(s) IV Push every 8 hours PRN Nausea and/or Vomiting      ALLERGIES: Allergies    No Known Allergies    Intolerances        OBJECTIVE:  ICU Vital Signs Last 24 Hrs  T(C): 36.7 (23 Jul 2023 05:36), Max: 36.9 (22 Jul 2023 17:29)  T(F): 98 (23 Jul 2023 05:36), Max: 98.4 (22 Jul 2023 17:29)  HR: 80 (23 Jul 2023 05:36) (72 - 80)  BP: 146/80 (23 Jul 2023 05:36) (127/77 - 146/80)  BP(mean): --  ABP: --  ABP(mean): --  RR: 18 (23 Jul 2023 05:36) (16 - 18)  SpO2: 98% (23 Jul 2023 05:36) (98% - 100%)    O2 Parameters below as of 23 Jul 2023 05:36  Patient On (Oxygen Delivery Method): room air            CAPILLARY BLOOD GLUCOSE      POCT Blood Glucose.: 268 mg/dL (23 Jul 2023 00:25)    CAPILLARY BLOOD GLUCOSE      POCT Blood Glucose.: 268 mg/dL (23 Jul 2023 00:25)    I&O's Summary    Daily     Daily     PHYSICAL EXAMINATION:  General: Comfortable, no acute distress, cooperative with exam.  HEENT: PERRLA  Respiratory: CTAB, normal respiratory effort, no coughing, wheezes, crackles, or rales.  CV: RRR, S1S2, no murmurs, rubs or gallops. No JVD. Distal pulses intact.  Abdominal: Soft, nontender, nondistended, no rebound or guarding, normal bowel sounds.  Neurology: AOx3, no focal neuro defects, RAHMAN x 4.  Extremities: No pitting edema, + Peripheral pulses.      LABS:                          6.3    4.08  )-----------( 71       ( 23 Jul 2023 02:41 )             20.1     07-23    139  |  105  |  44<H>  ----------------------------<  140<H>  4.8   |  21<L>  |  9.67<H>    Ca    8.3<L>      23 Jul 2023 04:01  Phos  3.3     07-23  Mg     2.6     07-22    TPro  6.7  /  Alb  3.8  /  TBili  1.2  /  DBili  x   /  AST  14  /  ALT  11  /  AlkPhos  68  07-23    LIVER FUNCTIONS - ( 23 Jul 2023 04:01 )  Alb: 3.8 g/dL / Pro: 6.7 g/dL / ALK PHOS: 68 U/L / ALT: 11 U/L / AST: 14 U/L / GGT: x           PT/INR - ( 22 Jul 2023 23:16 )   PT: 12.7 sec;   INR: 1.10 ratio         PTT - ( 22 Jul 2023 23:16 )  PTT:23.7 sec        Urinalysis Basic - ( 23 Jul 2023 04:01 )    Color: x / Appearance: x / SG: x / pH: x  Gluc: 140 mg/dL / Ketone: x  / Bili: x / Urobili: x   Blood: x / Protein: x / Nitrite: x   Leuk Esterase: x / RBC: x / WBC x   Sq Epi: x / Non Sq Epi: x / Bacteria: x       PATIENT: MIHYEON HU, MRN: [id number]    CHIEF COMPLAINT: Patient is a 63y old  Female who presents with a chief complaint of clotted AVF and missed HD (22 Jul 2023 23:57)      INTERVAL HISTORY/OVERNIGHT EVENTS: No overnight events. Pt asymptomatic, was amenable to going home for outpatient thrombectomy but given high K of 5.3 will stay for thrombectomy and dialysis. s/p 10 lokelma, 5u insulin, dextrose. s/p 80 lasix (pt still makes urine).       MEDICATIONS:  MEDICATIONS  (STANDING):  carvedilol 3.125 milliGRAM(s) Oral every 12 hours  danazol 200 milliGRAM(s) Oral three times a day  dextrose 5%. 1000 milliLiter(s) (50 mL/Hr) IV Continuous <Continuous>  dextrose 5%. 1000 milliLiter(s) (100 mL/Hr) IV Continuous <Continuous>  dextrose 50% Injectable 25 Gram(s) IV Push once  dextrose 50% Injectable 12.5 Gram(s) IV Push once  gabapentin 100 milliGRAM(s) Oral daily  glucagon  Injectable 1 milliGRAM(s) IntraMuscular once  insulin lispro (ADMELOG) corrective regimen sliding scale   SubCutaneous three times a day before meals  insulin lispro (ADMELOG) corrective regimen sliding scale   SubCutaneous at bedtime  Nephro-deonna 1 Tablet(s) Oral daily  pantoprazole    Tablet 40 milliGRAM(s) Oral before breakfast  sodium chloride 0.9%. 1000 milliLiter(s) (20 mL/Hr) IV Continuous <Continuous>    MEDICATIONS  (PRN):  acetaminophen     Tablet .. 650 milliGRAM(s) Oral every 6 hours PRN Temp greater or equal to 38C (100.4F), Mild Pain (1 - 3)  dextrose Oral Gel 15 Gram(s) Oral once PRN Blood Glucose LESS THAN 70 milliGRAM(s)/deciliter  melatonin 3 milliGRAM(s) Oral at bedtime PRN Insomnia  ondansetron Injectable 4 milliGRAM(s) IV Push every 8 hours PRN Nausea and/or Vomiting      ALLERGIES: Allergies    No Known Allergies    Intolerances        OBJECTIVE:  ICU Vital Signs Last 24 Hrs  T(C): 36.7 (23 Jul 2023 05:36), Max: 36.9 (22 Jul 2023 17:29)  T(F): 98 (23 Jul 2023 05:36), Max: 98.4 (22 Jul 2023 17:29)  HR: 80 (23 Jul 2023 05:36) (72 - 80)  BP: 146/80 (23 Jul 2023 05:36) (127/77 - 146/80)  BP(mean): --  ABP: --  ABP(mean): --  RR: 18 (23 Jul 2023 05:36) (16 - 18)  SpO2: 98% (23 Jul 2023 05:36) (98% - 100%)    O2 Parameters below as of 23 Jul 2023 05:36  Patient On (Oxygen Delivery Method): room air            CAPILLARY BLOOD GLUCOSE      POCT Blood Glucose.: 268 mg/dL (23 Jul 2023 00:25)    CAPILLARY BLOOD GLUCOSE      POCT Blood Glucose.: 268 mg/dL (23 Jul 2023 00:25)    I&O's Summary    Daily     Daily     PHYSICAL EXAMINATION:  General: Comfortable, no acute distress, cooperative with exam.  HEENT: PERRLA  Respiratory: CTAB, normal respiratory effort, no coughing, wheezes, crackles, or rales.  CV: RRR, S1S2, no murmurs, rubs or gallops. No JVD. Distal pulses intact.  Abdominal: Soft, nontender, nondistended, no rebound or guarding, normal bowel sounds.  Neurology: AOx3, no focal neuro defects, RAHMAN x 4.  Extremities: No pitting edema, + Peripheral pulses. LUE AV fistula, no thrill felt or heard       LABS:                          6.3    4.08  )-----------( 71       ( 23 Jul 2023 02:41 )             20.1     07-23    139  |  105  |  44<H>  ----------------------------<  140<H>  4.8   |  21<L>  |  9.67<H>    Ca    8.3<L>      23 Jul 2023 04:01  Phos  3.3     07-23  Mg     2.6     07-22    TPro  6.7  /  Alb  3.8  /  TBili  1.2  /  DBili  x   /  AST  14  /  ALT  11  /  AlkPhos  68  07-23    LIVER FUNCTIONS - ( 23 Jul 2023 04:01 )  Alb: 3.8 g/dL / Pro: 6.7 g/dL / ALK PHOS: 68 U/L / ALT: 11 U/L / AST: 14 U/L / GGT: x           PT/INR - ( 22 Jul 2023 23:16 )   PT: 12.7 sec;   INR: 1.10 ratio         PTT - ( 22 Jul 2023 23:16 )  PTT:23.7 sec        Urinalysis Basic - ( 23 Jul 2023 04:01 )    Color: x / Appearance: x / SG: x / pH: x  Gluc: 140 mg/dL / Ketone: x  / Bili: x / Urobili: x   Blood: x / Protein: x / Nitrite: x   Leuk Esterase: x / RBC: x / WBC x   Sq Epi: x / Non Sq Epi: x / Bacteria: x

## 2023-07-23 NOTE — CONSULT NOTE ADULT - ASSESSMENT
Interventional Radiology    Evaluate for Procedure: LUE AVFistulogram / thrombectomy    HPI: 64 yo female with hx of myelofibrosis, polycythemia vera, portal HTN, ESRD on HD (MWF via LUE AVF), p/w for thrombosed AVF. Last HD session was Wednesday 7/19.     Allergies: No Known Allergies    Medications (Abx/Cardiac/Anticoagulation/Blood Products)    carvedilol: 3.125 milliGRAM(s) Oral (07-23 @ 05:29)    Data:    56.7  T(C): 36.8  HR: 83  BP: 139/75  RR: 18  SpO2: 97%    -WBC 4.08 / HgB 6.3 / Hct 20.1 / Plt 71  -Na 139 / Cl 105 / BUN 44 / Glucose 140  -K 4.8 / CO2 21 / Cr 9.67  -ALT 11 / Alk Phos 68 / T.Bili 1.2  -INR 1.10 / PTT 23.7      Radiology: LUE AVF duplex reviewed    Assessment/Plan:   64 yo female with hx of myelofibrosis, polycythemia vera, portal HTN, ESRD on HD (MWF via LUE AVF), p/w for thrombosed AVF. Last HD session was Wednesday 7/19.   -- imaging and chart reviewed, patient is anemic but receiving blood transfusion  -- plan for discharge and fistulogram/thrombectomy at the access center tomorrow   -- please have patient call access center [phone] to let them know she is coming in for the procedure    d/w Dr. Frederick    --  Cristiano Baxter DO/MERCEDES, PGY-5  Vascular and Interventional Radiology   Available on Microsoft Teams    - Non-emergent consults: Place IR consult order in [place]  - Emergent issues (pager): [facility] [phone]; [facility] [phone]; [phone]  - Scheduling questions: [facility] [phone]; [facility] [phone]  - Clinic/outpatient booking: [facility] [phone]; [facility] [phone] Interventional Radiology    Evaluate for Procedure: LUE AVFistulogram / thrombectomy    HPI: 62 yo female with hx of myelofibrosis, polycythemia vera, portal HTN, ESRD on HD (MWF via LUE AVF), p/w for thrombosed AVF. Last HD session was Wednesday 7/19.     Allergies: No Known Allergies    Medications (Abx/Cardiac/Anticoagulation/Blood Products)    carvedilol: 3.125 milliGRAM(s) Oral (07-23 @ 05:29)    Data:    56.7  T(C): 36.8  HR: 83  BP: 139/75  RR: 18  SpO2: 97%    -WBC 4.08 / HgB 6.3 / Hct 20.1 / Plt 71  -Na 139 / Cl 105 / BUN 44 / Glucose 140  -K 4.8 / CO2 21 / Cr 9.67  -ALT 11 / Alk Phos 68 / T.Bili 1.2  -INR 1.10 / PTT 23.7      Radiology: LUE AVF duplex reviewed    Assessment/Plan:   62 yo female with hx of myelofibrosis, polycythemia vera, portal HTN, ESRD on HD (MWF via LUE AVF), p/w for thrombosed AVF. Last HD session was Wednesday 7/19.   -- imaging and chart reviewed, patient is anemic but receiving blood transfusion  -- plan for discharge and fistulogram/thrombectomy at the access center tomorrow   -- please have patient call access center [phone] to let them know she is coming in for the procedure    d/w Dr. Frederick    --  Cristiano Baxter DO/MERCEDES, PGY-5  Vascular and Interventional Radiology   Available on [employer] Teams    - Non-emergent consults: Place IR consult order in [place]  - Emergent issues (pager): [facility] [phone]; [facility] [phone]; [phone]  - Scheduling questions: [facility] [phone]; [facility] [phone]  - Clinic/outpatient booking: [facility] [phone]; [facility] [phone]    IR attending addendum    Further discussed with Dr. Baxter. Lab values and clinical considerations will necessitate keeping pt for either HD or thrombectomy tomorrow depending upon ability to correct hyperkalemia and acid-base status.

## 2023-07-23 NOTE — CONSULT NOTE ADULT - ATTENDING COMMENTS
#ESRD - HD MWF; last HD 7/19 Wednesday  no urgent indication for dialysis today  #presents with thrombosed avf  needs thrombectomy  awaiting IR intervention tomorrow then will plan on HD per regular schedule tomorrow 7/24  #hyperkalemia  plan lokelma 10g tid today  monitor k level    pt states she still urinates- can also try 80mg IV Lasix  #anemia in ckd+ myelofibrosis  prbc as needed   heme knows pt- Dr Jacky whitman    call if situation changes  d/w med team

## 2023-07-23 NOTE — PROGRESS NOTE ADULT - ATTENDING COMMENTS
Patient is a 63 year old female, with PMH of myelofibrosis, polycythemia vera, ESRD on MWF schedule via LUE AVF, portal HTN c/b IGV1, subclavian port for transfusions, gastric varices, presents for clotted fistula and inability to undergo dialysis.    - patient with occlusive thrombus in AVF  - original plan for patient to be DC home and plan for outpatient thrombectomy at access center tomorrow but potassium noted to be rising  - IR recs noted; plan for intervention possibly tomorrow vs Tuesday; keep NPO after MN in case of procedure  - nephro recs noted; lokelma TID for now; can consider lasix IV   - monitor BMP closely   - s/p 1 PRBC this morning for Hgb of 6.3; no active bleeding noted; possible ACD in setting of ESRD; will hold off on giving second PRBC for now to avoid fluid overload; repeat CBC tonight and transfuse as needed for Hgb < 7 and give lasix as well afterwards   - monitor CBC and BMP daily     Rest as above. Discussed with HS.

## 2023-07-23 NOTE — PATIENT PROFILE ADULT - FALL HARM RISK - RISK INTERVENTIONS

## 2023-07-23 NOTE — PROGRESS NOTE ADULT - PROBLEM SELECTOR PLAN 2
mild hyperkalemia , no other indication for urgent HD , will check EKG , if no changes can treat w/ Lokelma   - Renal consult - to be arranged by day team   - renal diet  - f/u EKG , IF peaked T waves present , can give calcium gluconate  - trend serum K   - Kayexalate   - monitor phos  - nephro deonna - renal diet  - nephro deonna  7/23: s/p lokelma x1, 5u insulin, dextrose, 80 lasix     - f/u EKG    -lokelma TID   - trend serum K - BMP 8PM -- if over 5.5 can do 5u insulin, dextrose

## 2023-07-23 NOTE — DISCHARGE NOTE PROVIDER - DISCHARGE DIET
Regular Diet - No restrictions/Low Sodium Diet/Renal Diets (for dialysis) Low Sodium Diet/Renal Diets (for dialysis)

## 2023-07-23 NOTE — DISCHARGE NOTE PROVIDER - HOSPITAL COURSE
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 She has had several episodes of thrombosis of AVF treated by IR with mechanical with plasty and stent placement and medical thrombectomies- most recent in 2019 and 2020.     In the hospital her hyperkalemia was treated with Lokelma, insulin and Ca gluconate which lowers her level to 4.7. Vascular surgery recommended a thrombectomy with IR. Nephro...    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 She has had several episodes of thrombosis of AVF treated by IR with mechanical with plasty and stent placement and medical thrombectomies- most recent in 2019 and 2020.     In the hospital her hyperkalemia was treated with Lokelma, insulin and Ca gluconate which lowered her level to 4.7. U/S of the arm showed occlussive thrombus of the left upper arm fistula. Vascular surgery recommended a thrombectomy with IR. After discussion with vascular, IR team and nephrology ...    Patient deemed medically stable for home.    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 She has had several episodes of thrombosis of AVF treated by IR with mechanical with plasty and stent placement and medical thrombectomies- most recent in 2019 and 2020.     In the hospital her hyperkalemia was treated with Lokelma, insulin and Ca gluconate which lowered her level to 4.7. U/S of the arm showed occluded thrombus of the left upper arm fistula. s/p thrombectomy with IR on 7/25 with successful outcome. Had 1 session HD which patient tolerated with plan for repeat HD on ---.     Patient deemed medically stable for home.    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 She has had several episodes of thrombosis of AVF treated by IR with mechanical with plasty and stent placement and medical thrombectomies- most recent in 2019 and 2020.     In the hospital her hyperkalemia was treated with Lokelma, insulin and Ca gluconate which lowered her level to 4.7. U/S of the arm showed occluded thrombus of the left upper arm fistula. s/p thrombectomy with IR on 7/25 with successful outcome. Had 2 sessions of HD which patient tolerated with plan for repeat HD on discharge on 7/29.    Patient deemed medically stable for home.    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 She has had several episodes of thrombosis of AVF treated by IR with mechanical with plasty and stent placement and medical thrombectomies- most recent in 2019 and 2020.     In the hospital her hyperkalemia was treated with Lokelma, insulin and Ca gluconate which lowered her level to 4.7. U/S of the arm showed occluded thrombus of the left upper arm fistula. s/p thrombectomy with IR on 7/25 with successful outcome. Had 2 sessions of HD which patient tolerated with plan for repeat HD on discharge on 7/29.    Patient deemed medically optimized for discharge home.

## 2023-07-23 NOTE — DISCHARGE NOTE PROVIDER - NSDCCPCAREPLAN_GEN_ALL_CORE_FT
PRINCIPAL DISCHARGE DIAGNOSIS  Diagnosis: AV fistula occlusion  Assessment and Plan of Treatment: You were admitted due to an occlusion of your AV fistula. A duplex showed that you had a thrombosis of the fistula which prevented you from getting dialysis. The vascular surgery team recommended a thrombectomy to remove the clot with the interventional radiology team.        SECONDARY DISCHARGE DIAGNOSES  Diagnosis: Hyperkalemia  Assessment and Plan of Treatment: You were found to have a high level of potassium on admission. You were treated with medications that help you excrete the potassium.     PRINCIPAL DISCHARGE DIAGNOSIS  Diagnosis: AV fistula occlusion  Assessment and Plan of Treatment: You were admitted due to an occlusion of your AV fistula. A duplex showed that you had a thrombosis of the fistula which prevented you from getting dialysis. The vascular surgery team recommended a thrombectomy to remove the clot with the interventional radiology team. It was decided that you could get this procedure outpatient tomorrow at the surgical center and get HD after the procedure. Please follow up with Dr. Dempsey regarding this procedure.         SECONDARY DISCHARGE DIAGNOSES  Diagnosis: Hyperkalemia  Assessment and Plan of Treatment: You were found to have a high level of potassium on admission. You were treated with medications that help you decrease potassium. The only way to definitively decrease it would be dialysis. Please follow up tomorrow regarding your thrombectomy procedure.     PRINCIPAL DISCHARGE DIAGNOSIS  Diagnosis: AV fistula occlusion  Assessment and Plan of Treatment: You were admitted due to an occlusion of your AV fistula. A duplex showed that you had a thrombosis of the fistula which prevented you from getting dialysis. You had a thrombectomy successfully performed with the IR team and your fistula was successfully used for HD  x 2 in the hospital. On discharge, please return to your scheduled HD session on Friday. Please follow up with your nephrologist for ongoing HD needs.         SECONDARY DISCHARGE DIAGNOSES  Diagnosis: Anemia  Assessment and Plan of Treatment: You presented to the hospital with acute on chronic anemia. You typically receive some blood transfusions with your HD sessions. You were given blood transfusions in the hospital and had stable hemoglobin levels. Please continue to follow with your hematologist for ongoing care. Please also continue to discuss continuing medications for your polycythemia vera. If you become profoundly weak, short of breath or fatigued, please return to the hospital.

## 2023-07-23 NOTE — CHART NOTE - NSCHARTNOTEFT_GEN_A_CORE
PRE-INTERVENTIONAL RADIOLOGY PROCEDURE NOTE      Patient Age: 63    Patient Gender: Female    Procedure: LUE AV fistulogram and thrombectomy    Diagnosis/Indication: clotted AV fistula, ESRD pt with no dialysis since 7/19     Interventional Radiology Attending Physician: Dr. Frederick     Ordering Attending Physician: Dr. Weber     Pertinent Medical History: 63F w/  myelofibrosis, polycythemia vera, ESRD on MWF schedule via LUE AVF, portal HTN c/b IGV1, subclavian port for transfusions, gastric varices, presents for clotted fistula and inability to undergo dialysis      Pertinent labs:                      7.1    4.96  )-----------( 72       ( 23 Jul 2023 10:39 )             22.7       07-23    136  |  103  |  44<H>  ----------------------------<  140<H>  5.3   |  21<L>  |  9.52<H>    Ca    8.9      23 Jul 2023 12:20  Phos  3.3     07-23  Mg     2.6     07-22    TPro  6.7  /  Alb  3.8  /  TBili  1.2  /  DBili  x   /  AST  14  /  ALT  11  /  AlkPhos  68  07-23      PT/INR - ( 22 Jul 2023 23:16 )   PT: 12.7 sec;   INR: 1.10 ratio         PTT - ( 22 Jul 2023 23:16 )  PTT:23.7 sec        Patient and Family Aware ? Yes

## 2023-07-23 NOTE — PATIENT PROFILE ADULT - IS PATIENT POST-MENOPAUSAL?
Hydralazine was increased by Cardiology from 25mg TID to 50mg TID yesterday 1/23/23.    Patient upset about this as patient complains that it causes her to have a headache and her ears to be plugged.      BP today 164/89     Per TE 1/20/23:  Called and spoke with nurse at [facility]. Patient's blood pressures have been elevated lately, 190's/90's. She has often needed the prn Clonidine to bring blood pressures down in to the 160's/90's. She had recently been started on Coreg however was unable to tolerate related to side effects. Called and spoke with Dr. Matthews and new orders received:  Date: 1/23/2023     Time of Call: 9:36 AM     Diagnosis:  Hypertension     [ VORB ] Ordering provider: Dr. Zuniga  Order: Increase Hydralazine to 50 MG TID.     Order received by: Jimmie Coppola RN     Follow-up/additional notes: Nurse at [facility] notified.     Cardiology referring now to primary as they are not sure where to go from here if patient does not want to take the 50mg hydralazine.     Patient currently takes:  Losartan 25mg   Clonudube 0.1mg daily as needed for BP >180.    Fax number: [phone]     Please advise.    Zari Garcia RN  [facility]    
Please send message to her Cardiologist  Pt is Intolerant to se\veral meds   
yes

## 2023-07-23 NOTE — PATIENT PROFILE ADULT - FUNCTIONAL ASSESSMENT - BASIC MOBILITY 6.
4-calculated by average/Not able to assess (calculate score using [facility] averaging method)

## 2023-07-23 NOTE — PROGRESS NOTE ADULT - PROBLEM SELECTOR PLAN 1
-Vascular Surgery consult appreciated,   - IR consult for thrombectomy -Vascular Surgery consult appreciated, was considering outpatient thrombectomy but in setting of hyperkalemia will be done inpatient   - IR consult for thrombectomy - NPO after MN for ?thrombectomy tomorrow evening

## 2023-07-23 NOTE — CHART NOTE - NSCHARTNOTEFT_GEN_A_CORE
IR Follow-Up     given patient's increase in K+ to 5.3, primary team / nephrology would prefer to keep patient as inpatient.    Will plan for LUE AVFistulogram/thrombectomy/angioplasty tomorrow Monday 7/24 vs. Tuesday 7/25, depending on schedule & potential emergencies.    If patient requires emergent dialysis prior to procedure, recommend placing a femoral non-tunneled dialysis catheter to temporize patient prior to fistulogram.     please place IR procedure order under Dr. Frederick and write pre-procedure note.     d/w Dr. Frederick    --  Cristiano Baxter DO/MERCEDES  Interventional Radiology Resident (PGY-5)  Available on [employer] TEAMS    For EMERGENT inquiries/questions:  IR Pager ([facility]): [phone]  IR Pager ([facility]): [phone] ; [phone]    For non-emergent consults/questions:   Please place a sunrise order "Consult- Interventional Radiology" with an appropriate callback number    For questions about scheduling during appropriate work hours, call IR :  [facility]: [phone]  [facility]: [phone]    For outpatient IR booking:  [facility]: [phone]  [facility]: [phone] IR Follow-Up     given patient's increase in K+ to 5.3, primary team / nephrology would prefer to keep patient as inpatient.    Will plan for LUE AVFistulogram/thrombectomy/angioplasty tomorrow Monday 7/24 vs. Tuesday 7/25, depending on schedule & potential emergencies.    If patient requires emergent dialysis prior to procedure, recommend placing a femoral non-tunneled dialysis catheter to temporize patient prior to fistulogram.     NPO at midnight tonight.     please place IR procedure order under Dr. Frederick and write pre-procedure note.     d/w Dr. Frederick    --  Cristiano Baxter DO/MERCEDES  Interventional Radiology Resident (PGY-5)  Available on [employer] TEAMS    For EMERGENT inquiries/questions:  IR Pager ([facility]): [phone]  IR Pager ([facility]): [phone] ; [phone]    For non-emergent consults/questions:   Please place a sunrise order "Consult- Interventional Radiology" with an appropriate callback number    For questions about scheduling during appropriate work hours, call IR :  [facility]: [phone]  [facility]: [phone]    For outpatient IR booking:  [facility]: [phone]  [facility]: [phone]

## 2023-07-23 NOTE — DISCHARGE NOTE PROVIDER - NSDCCPTREATMENT_GEN_ALL_CORE_FT
PRINCIPAL PROCEDURE  Procedure: Venous duplex scan LUE  Findings and Treatment: IMPRESSION:  Occlusive thrombosis of the left upper extremity AV fistula and outflow   stent.< from: VA Duplex Upper Extrem Arterial Limited, Left (07.22.23 @ 21:26) >     PRINCIPAL PROCEDURE  Procedure: Venous duplex scan LUE  Findings and Treatment: IMPRESSION:  Occlusive thrombosis of the left upper extremity AV fistula and outflow   stent.< from: VA Duplex Upper Extrem Arterial Limited, Left (07.22.23 @ 21:26) >      SECONDARY PROCEDURE  Procedure: Chest xray, PA & lateral  Findings and Treatment:   < end of copied text >  FINDINGS:  Right chest wall port catheter with tip terminating in the SVC.  The heart is not accurately assessed in this AP projection.  The lungs are clear.  There is no pleural effusion.  There is no pneumothorax.  No acute bony abnormality.  IMPRESSION:  Right chest wall port catheter with tip terminating in the SVC.< from: Xray Chest 1 View- PORTABLE-Urgent (Xray Chest 1 View- PORTABLE-Urgent .) (07.22.23 @ 21:03) >

## 2023-07-24 ENCOUNTER — APPOINTMENT (OUTPATIENT)
Dept: HEMATOLOGY ONCOLOGY | Facility: CLINIC | Age: 63
End: 2023-07-24

## 2023-07-24 ENCOUNTER — NON-APPOINTMENT (OUTPATIENT)
Age: 63
End: 2023-07-24

## 2023-07-24 LAB
ANION GAP SERPL CALC-SCNC: 15 MMOL/L — SIGNIFICANT CHANGE UP (ref 5–17)
ANION GAP SERPL CALC-SCNC: 15 MMOL/L — SIGNIFICANT CHANGE UP (ref 5–17)
BASOPHILS # BLD AUTO: 0.04 K/UL — SIGNIFICANT CHANGE UP (ref 0–0.2)
BASOPHILS NFR BLD AUTO: 0.9 % — SIGNIFICANT CHANGE UP (ref 0–2)
BUN SERPL-MCNC: 44 MG/DL — HIGH (ref 7–23)
BUN SERPL-MCNC: 48 MG/DL — HIGH (ref 7–23)
CALCIUM SERPL-MCNC: 8.1 MG/DL — LOW (ref 8.4–10.5)
CALCIUM SERPL-MCNC: 8.5 MG/DL — SIGNIFICANT CHANGE UP (ref 8.4–10.5)
CHLORIDE SERPL-SCNC: 102 MMOL/L — SIGNIFICANT CHANGE UP (ref 96–108)
CHLORIDE SERPL-SCNC: 103 MMOL/L — SIGNIFICANT CHANGE UP (ref 96–108)
CO2 SERPL-SCNC: 17 MMOL/L — LOW (ref 22–31)
CO2 SERPL-SCNC: 19 MMOL/L — LOW (ref 22–31)
CREAT SERPL-MCNC: 10.43 MG/DL — HIGH (ref 0.5–1.3)
CREAT SERPL-MCNC: 11.03 MG/DL — HIGH (ref 0.5–1.3)
EGFR: 4 ML/MIN/1.73M2 — LOW
EGFR: 4 ML/MIN/1.73M2 — LOW
EOSINOPHIL # BLD AUTO: 0.11 K/UL — SIGNIFICANT CHANGE UP (ref 0–0.5)
EOSINOPHIL NFR BLD AUTO: 2.5 % — SIGNIFICANT CHANGE UP (ref 0–6)
GLUCOSE BLDC GLUCOMTR-MCNC: 186 MG/DL — HIGH (ref 70–99)
GLUCOSE BLDC GLUCOMTR-MCNC: 243 MG/DL — HIGH (ref 70–99)
GLUCOSE BLDC GLUCOMTR-MCNC: 91 MG/DL — SIGNIFICANT CHANGE UP (ref 70–99)
GLUCOSE BLDC GLUCOMTR-MCNC: 96 MG/DL — SIGNIFICANT CHANGE UP (ref 70–99)
GLUCOSE SERPL-MCNC: 121 MG/DL — HIGH (ref 70–99)
GLUCOSE SERPL-MCNC: 90 MG/DL — SIGNIFICANT CHANGE UP (ref 70–99)
HCT VFR BLD CALC: 23.5 % — LOW (ref 34.5–45)
HGB BLD-MCNC: 7.6 G/DL — LOW (ref 11.5–15.5)
IMM GRANULOCYTES NFR BLD AUTO: 5 % — HIGH (ref 0–0.9)
LYMPHOCYTES # BLD AUTO: 0.45 K/UL — LOW (ref 1–3.3)
LYMPHOCYTES # BLD AUTO: 10.2 % — LOW (ref 13–44)
MAGNESIUM SERPL-MCNC: 2.2 MG/DL — SIGNIFICANT CHANGE UP (ref 1.6–2.6)
MCHC RBC-ENTMCNC: 30.2 PG — SIGNIFICANT CHANGE UP (ref 27–34)
MCHC RBC-ENTMCNC: 32.3 GM/DL — SIGNIFICANT CHANGE UP (ref 32–36)
MCV RBC AUTO: 93.3 FL — SIGNIFICANT CHANGE UP (ref 80–100)
MONOCYTES # BLD AUTO: 0.23 K/UL — SIGNIFICANT CHANGE UP (ref 0–0.9)
MONOCYTES NFR BLD AUTO: 5.2 % — SIGNIFICANT CHANGE UP (ref 2–14)
NEUTROPHILS # BLD AUTO: 3.38 K/UL — SIGNIFICANT CHANGE UP (ref 1.8–7.4)
NEUTROPHILS NFR BLD AUTO: 76.2 % — SIGNIFICANT CHANGE UP (ref 43–77)
NRBC # BLD: 1 /100 WBCS — HIGH (ref 0–0)
PHOSPHATE SERPL-MCNC: 3.5 MG/DL — SIGNIFICANT CHANGE UP (ref 2.5–4.5)
PLATELET # BLD AUTO: 58 K/UL — LOW (ref 150–400)
POTASSIUM SERPL-MCNC: 4.5 MMOL/L — SIGNIFICANT CHANGE UP (ref 3.5–5.3)
POTASSIUM SERPL-MCNC: 5 MMOL/L — SIGNIFICANT CHANGE UP (ref 3.5–5.3)
POTASSIUM SERPL-SCNC: 4.5 MMOL/L — SIGNIFICANT CHANGE UP (ref 3.5–5.3)
POTASSIUM SERPL-SCNC: 5 MMOL/L — SIGNIFICANT CHANGE UP (ref 3.5–5.3)
RBC # BLD: 2.52 M/UL — LOW (ref 3.8–5.2)
RBC # FLD: 18.7 % — HIGH (ref 10.3–14.5)
SODIUM SERPL-SCNC: 135 MMOL/L — SIGNIFICANT CHANGE UP (ref 135–145)
SODIUM SERPL-SCNC: 136 MMOL/L — SIGNIFICANT CHANGE UP (ref 135–145)
WBC # BLD: 4.43 K/UL — SIGNIFICANT CHANGE UP (ref 3.8–10.5)
WBC # FLD AUTO: 4.43 K/UL — SIGNIFICANT CHANGE UP (ref 3.8–10.5)

## 2023-07-24 PROCEDURE — 99233 SBSQ HOSP IP/OBS HIGH 50: CPT

## 2023-07-24 PROCEDURE — 99232 SBSQ HOSP IP/OBS MODERATE 35: CPT | Mod: GC

## 2023-07-24 RX ORDER — LIDOCAINE 4 G/100G
1 CREAM TOPICAL DAILY
Refills: 0 | Status: DISCONTINUED | OUTPATIENT
Start: 2023-07-24 | End: 2023-07-27

## 2023-07-24 RX ORDER — ERYTHROPOIETIN 10000 [IU]/ML
20000 INJECTION, SOLUTION INTRAVENOUS; SUBCUTANEOUS
Refills: 0 | Status: DISCONTINUED | OUTPATIENT
Start: 2023-07-24 | End: 2023-07-24

## 2023-07-24 RX ORDER — FUROSEMIDE 40 MG
40 TABLET ORAL DAILY
Refills: 0 | Status: DISCONTINUED | OUTPATIENT
Start: 2023-07-24 | End: 2023-07-25

## 2023-07-24 RX ORDER — FUROSEMIDE 40 MG
40 TABLET ORAL ONCE
Refills: 0 | Status: COMPLETED | OUTPATIENT
Start: 2023-07-24 | End: 2023-07-24

## 2023-07-24 RX ADMIN — Medication 40 MILLIGRAM(S): at 19:19

## 2023-07-24 RX ADMIN — Medication 40 MILLIGRAM(S): at 12:05

## 2023-07-24 RX ADMIN — Medication 3 MILLIGRAM(S): at 21:34

## 2023-07-24 RX ADMIN — PANTOPRAZOLE SODIUM 40 MILLIGRAM(S): 20 TABLET, DELAYED RELEASE ORAL at 06:25

## 2023-07-24 RX ADMIN — LIDOCAINE 1 PATCH: 4 CREAM TOPICAL at 19:42

## 2023-07-24 RX ADMIN — CARVEDILOL PHOSPHATE 3.12 MILLIGRAM(S): 80 CAPSULE, EXTENDED RELEASE ORAL at 06:25

## 2023-07-24 RX ADMIN — GABAPENTIN 100 MILLIGRAM(S): 400 CAPSULE ORAL at 21:34

## 2023-07-24 RX ADMIN — DANAZOL 200 MILLIGRAM(S): 200 CAPSULE ORAL at 06:25

## 2023-07-24 RX ADMIN — DANAZOL 200 MILLIGRAM(S): 200 CAPSULE ORAL at 21:31

## 2023-07-24 RX ADMIN — SODIUM ZIRCONIUM CYCLOSILICATE 10 GRAM(S): 10 POWDER, FOR SUSPENSION ORAL at 13:10

## 2023-07-24 RX ADMIN — Medication 1 TABLET(S): at 12:05

## 2023-07-24 RX ADMIN — SODIUM ZIRCONIUM CYCLOSILICATE 10 GRAM(S): 10 POWDER, FOR SUSPENSION ORAL at 06:25

## 2023-07-24 RX ADMIN — LIDOCAINE 1 PATCH: 4 CREAM TOPICAL at 12:04

## 2023-07-24 RX ADMIN — CHLORHEXIDINE GLUCONATE 1 APPLICATION(S): 213 SOLUTION TOPICAL at 06:25

## 2023-07-24 RX ADMIN — CARVEDILOL PHOSPHATE 3.12 MILLIGRAM(S): 80 CAPSULE, EXTENDED RELEASE ORAL at 17:26

## 2023-07-24 RX ADMIN — DANAZOL 200 MILLIGRAM(S): 200 CAPSULE ORAL at 13:09

## 2023-07-24 RX ADMIN — Medication 1: at 17:26

## 2023-07-24 RX ADMIN — SODIUM CHLORIDE 20 MILLILITER(S): 9 INJECTION INTRAMUSCULAR; INTRAVENOUS; SUBCUTANEOUS at 02:58

## 2023-07-24 NOTE — PROGRESS NOTE ADULT - PROBLEM SELECTOR PLAN 4
on Jakafi   - trend platelets -consulted heme onc regarding Jakafi   - trend platelets  -Plt downtrending to 58, per IR would like >50 for procedure, will monitor -consulted heme onc regarding Jakafi   - trend platelets  -Plt downtrending to 58, likely secondary to uremia,  -Per IR would like >50 for procedure, will monitor

## 2023-07-24 NOTE — PROGRESS NOTE ADULT - PROBLEM SELECTOR PLAN 7
- heme/onc consult to be arranged by day team   - hold Jakafi until further evaluated by heme/onc - heme/onc consult to be arranged by day team   - hold Derik until further evaluated by heme/onc  -f/u heme onc recs

## 2023-07-24 NOTE — PATIENT PROFILE ADULT - LEGAL HELP
2023  EMPLOYEE INFORMATION: EMPLOYER INFORMATION:   NAME: Dennis SUTTON/JUAN LUIS ( ALL SITES)    : 1990 [phone]    DATE OF INJURY/EVENT: 2023            Location: [facility]   Treating Provider: Bebe Vasquez PA-C  Time In:  8:43 AM Time Out:  9:28 AM      DIAGNOSIS:   1. Closed avulsion fracture of proximal phalanx of finger, initial encounter-RIGHT INDEX    2. Contusion of right index finger without damage to nail, initial encounter      STATUS: This injury is determined to be WORK RELATED.    RETURN TO WORK:Employee may return to work with restrictions.   Return Date: 2023            RESTRICTIONS: Restrictions are to be followed at work and at home.  Restrictions are in effect until next follow-up visit.    Wear finger splint while working.   and grasp as tolerated with the right hand.  No lift, push, or pull greater than 10 lb with the right hand.     TREATMENT PLAN:   Medications for this injury/condition:   Ibuprofen as needed for pain and inflammation control.  An aluminum finger splint was given to the patient at today's visit.  Referral/Consult: None  Diagnostic Testing:  An x-ray of the right hand 2nd digit was performed in urgent care on 2023  Impression:  Possible faint osseous density projecting along the palmar aspect of the second distal  interphalangeal joint which could be artifactual or secondary to small avulsion fragment. Mild hyperextension at the second distal interphalangeal joint. No donor site is evident. Correlate for point tenderness. No dislocation.       Drug test completed.   AT [facility] Breath alcohol test completed.      Instructions: The patient was educated on RICE (rest, ice, compress, elevate) therapy to help remove inflammation.  Ice can be utilized in 20 minute increments.  As tolerated, okay to remove splint and perform gentle range of motion stretching exercises to preserve and promote  mobility.  Sock exercises discussed with the patient.  If your symptoms worsen or you develop additional concerns please call the office.    NEXT RETURN VISIT:  7/31/23 at 9:00am  Thank you for the privilege of providing medical care for this injury/condition.  If there are any questions, please call the occupational health clinic at Dept: [phone].    Electronically signed on 7/24/2023 at 9:26 AM by:   Bebe Vasquez PA-C   [facility]    The physician below agrees with the plan and restrictions placed on the patient by the provider above.  Destiny Tovar, DO           no

## 2023-07-24 NOTE — PROGRESS NOTE ADULT - ASSESSMENT
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

## 2023-07-24 NOTE — PROGRESS NOTE ADULT - PROBLEM SELECTOR PLAN 2
mild hyperkalemia , no other indication for urgent HD , will check EKG , if no changes can treat w/ Lokelma   - Renal consult - to be arranged by day team   - renal diet  - f/u EKG , IF peaked T waves present , can give calcium gluconate  - trend serum K   - Kayexalate   - monitor phos  - nephro deonna mild hyperkalemia , no other indication for urgent HD , will check EKG , if no changes can treat pablito/ Lokelma   - Renal consult - to be arranged by day team   - renal diet  - trend serum K : 5.0 on 7/24  - Kayexalate   - monitor phos  - nephro deonna

## 2023-07-24 NOTE — PROGRESS NOTE ADULT - PROBLEM SELECTOR PLAN 1
-Vascular Surgery consult appreciated,   - IR consult for thrombectomy -Vascular Surgery consult appreciated,   - pending IR for thrombectomy, pt not on the schedule 7/24 but planned for 7/25

## 2023-07-24 NOTE — PROVIDER CONTACT NOTE (CRITICAL VALUE NOTIFICATION) - BACKGROUND
(Admit Diagnosis) Hyperkalemia. (Problem/DX) Hyperglycemia, Polycythemia, Myelofibrosis, Thrombocytopenia, Anemia, ESRD on dialysis. PMH: AV fistula, hemoperitoneum as complication of peritoneal dialysis.
Admit Diagnosis: Hyperkalemia. (Problem/DX) Hyperglycemia, Polycythemia, Myelofibrosis, Thrombocytopenia, Anemia, ESRD on dialysis. PMH: AV fistula, hemoperitoneum as complication of peritoneal dialysis.

## 2023-07-24 NOTE — PROGRESS NOTE ADULT - SUBJECTIVE AND OBJECTIVE BOX
[facility] DIVISION OF KIDNEY DISEASES AND HYPERTENSION -- FOLLOW UP NOTE  --------------------------------------------------------------------------------  Chief Complaint: Clotted access    24 hour events/subjective:  None  Feels same         PAST HISTORY  --------------------------------------------------------------------------------  No significant changes to PMH, PSH, FHx, SHx, unless otherwise noted    ALLERGIES & MEDICATIONS  --------------------------------------------------------------------------------  Allergies    No Known Allergies    Intolerances      Standing Inpatient Medications  carvedilol 3.125 milliGRAM(s) Oral every 12 hours  chlorhexidine 2% Cloths 1 Application(s) Topical <User Schedule>  danazol 200 milliGRAM(s) Oral three times a day  dextrose 5%. 1000 milliLiter(s) IV Continuous <Continuous>  dextrose 5%. 1000 milliLiter(s) IV Continuous <Continuous>  dextrose 50% Injectable 25 Gram(s) IV Push once  dextrose 50% Injectable 12.5 Gram(s) IV Push once  furosemide   Injectable 40 milliGRAM(s) IV Push daily  gabapentin 100 milliGRAM(s) Oral daily  glucagon  Injectable 1 milliGRAM(s) IntraMuscular once  insulin lispro (ADMELOG) corrective regimen sliding scale   SubCutaneous three times a day before meals  insulin lispro (ADMELOG) corrective regimen sliding scale   SubCutaneous at bedtime  lidocaine   4% Patch 1 Patch Transdermal daily  Nephro-deonna 1 Tablet(s) Oral daily  pantoprazole    Tablet 40 milliGRAM(s) Oral before breakfast  sodium chloride 0.9%. 1000 milliLiter(s) IV Continuous <Continuous>    PRN Inpatient Medications  acetaminophen     Tablet .. 650 milliGRAM(s) Oral every 6 hours PRN  dextrose Oral Gel 15 Gram(s) Oral once PRN  melatonin 3 milliGRAM(s) Oral at bedtime PRN  ondansetron Injectable 4 milliGRAM(s) IV Push every 8 hours PRN      REVIEW OF SYSTEMS  --------------------------------------------------------------------------------  Gen: No weight changes, fatigue, fevers/chills, +weakness  Head/Eyes/Ears/Mouth: No headache; Normal hearing; Normal vision    Respiratory: No dyspnea, cough, wheezing, hemoptysis  CV: No chest pain, PND, orthopnea  GI: No abdominal pain, diarrhea, constipation, nausea, vomiting, melena, hematochezia  : No increased frequency, dysuria, hematuria, nocturia  MSK: No joint pain/swelling; no back pain; no edema   Heme: No easy bruising or bleeding  All other systems were reviewed and are negative, except as noted.      VITALS/PHYSICAL EXAM  --------------------------------------------------------------------------------  T(C): 36.8 (07-24-23 @ 16:36), Max: 37.2 (07-23-23 @ 23:00)  HR: 76 (07-24-23 @ 16:36) (70 - 80)  BP: 142/72 (07-24-23 @ 16:36) (116/71 - 146/81)  RR: 18 (07-24-23 @ 16:36) (17 - 18)  SpO2: 98% (07-24-23 @ 16:36) (95% - 99%)  Wt(kg): --    Weight (kg): 56.7 (07-22-23 @ 17:29)      07-23-23 @ 07:01  -  07-24-23 @ 07:00  --------------------------------------------------------  IN: 720 mL / OUT: 0 mL / NET: 720 mL    07-24-23 @ 07:01  -  07-24-23 @ 17:16  --------------------------------------------------------  IN: 0 mL / OUT: 0 mL / NET: 0 mL      Physical Exam:  PHYSICAL EXAM: vital signs as above  in no apparent distress  Neck: Supple, no JVD,    Lungs: no rhonchi, no wheeze, no crackles  CVS: S1 S2 no M/R/G  Abdomen: no tenderness, no organomegaly, BS present  Neuro: Grossly intact  Skin: warm, dry  Ext: no cyanosis or clubbing, no edema  Access: AVF with no bruit/No thrill    LABS/STUDIES  --------------------------------------------------------------------------------              7.6    4.43  >-----------<  58       [07-24-23 @ 05:01]              23.5     136  |  102  |  48  ----------------------------<  121      [07-24-23 @ 15:07]  4.5   |  19  |  11.03        Ca     8.5     [07-24-23 @ 15:07]      Mg     2.2     [07-24-23 @ 05:01]      Phos  3.5     [07-24-23 @ 05:01]    TPro  6.7  /  Alb  3.8  /  TBili  1.2  /  DBili  x   /  AST  14  /  ALT  11  /  AlkPhos  68  [07-23-23 @ 04:01]    PT/INR: PT 12.7 , INR 1.10       [07-22-23 @ 23:16]  PTT: 23.7       [07-22-23 @ 23:16]      Creatinine Trend:  SCr 11.03 [07-24 @ 15:07]  SCr 10.43 [07-24 @ 05:01]  SCr 9.84 [07-23 @ 20:39]  SCr 9.52 [07-23 @ 12:20]  SCr 9.67 [07-23 @ 04:01]    Urinalysis - [07-24-23 @ 15:07]      Color  / Appearance  / SG  / pH       Gluc 121 / Ketone   / Bili  / Urobili        Blood  / Protein  / Leuk Est  / Nitrite       RBC  / WBC  / Hyaline  / Gran  / Sq Epi  / Non Sq Epi  / Bacteria       Iron 163, TIBC 229, %sat 71      [12-08-22 @ 07:36]  HbA1c 4.9      [06-01-19 @ 00:46]

## 2023-07-24 NOTE — PROGRESS NOTE ADULT - ASSESSMENT
64 y/o ESRD patient here for a clotted AVF      #ESRD: On HD. HD MWF. Last session was on Wednesday ( 7/19)  AM labs reviewed. K has improved with lokelma. Volume status OK    #Access: Thrombosed AVF. For thrombectomy via IR. If unable to do thrombectomy, she will need PC placement along with a new AVF . Discussed with vascular   #hyperkalemia: received lokelma x 2 doses today  #MBD: Ca/Phos OK.    #HTN/ Volume: BP stable. Euvolemic on exam   Can give lasix 80 mg IV additional dose today   #anemia : Hg  low. Will give INDER with HD once access is put in   If pRBC needed, would give with HD   monitor hb trend      Piedad Zhou MD  O: [phone]  Contact me on teams

## 2023-07-24 NOTE — PROVIDER CONTACT NOTE (CRITICAL VALUE NOTIFICATION) - ACTION/TREATMENT ORDERED:
MILADY Manning to order 1U of PRBC. Pt safety maintained. Will continue to monitor.
Resident Deangelo Whelan notified and to order STAT 1U of PRBC to transfuse. Pt safety maintained. Will continue to monitor.

## 2023-07-24 NOTE — PROVIDER CONTACT NOTE (OTHER) - BACKGROUND
Admit Diagnosis: Hyperkalemia. (Problem/DX) Hyperglycemia, Polycythemia, Myelofibrosis, Thrombocytopenia, Anemia, ESRD on dialysis. PMH:  hemoperitoneum as complication of peritoneal dialysis.

## 2023-07-24 NOTE — CHART NOTE - NSCHARTNOTEFT_GEN_A_CORE
IR Follow Up    patient is with thrombosed LUE AVF with potential plan for thrombectomy today. due to daily schedule/emergencies, case will be postponed until tomorrow. patient's BMP within normal limits.    -rec discussion with vascular surgery regarding discharge and outpatient thrombectomy  -if outpatient thrombectomy cannot be performed, will tentatively plan for thrombectomy tomorrow, 7/25    Alex Rao MD  PGY-VI, Interventional Radiology Integrated Resident    -Available on [employer] TEAMS for all non-urgent questions  -Emergent issues: [facility]-p.[phone]; [facility]-[phone] ([phone])  -Non-emergent consults: Please place a Shafer order "IR Consult" with an appropriate callback number  -Scheduling questions: [facility]: [phone]; [facility]: [phone]  -Clinic/Outpatient booking: [facility]: [phone]; [facility]: [phone]

## 2023-07-24 NOTE — PROGRESS NOTE ADULT - SUBJECTIVE AND OBJECTIVE BOX
Patient is a 63y old  Female who presents with a chief complaint of clotted AVF and missed HD (23 Jul 2023 13:56)      SUBJECTIVE / OVERNIGHT EVENTS: Patient seen and examined at bedside.    MEDICATIONS  (STANDING):  carvedilol 3.125 milliGRAM(s) Oral every 12 hours  chlorhexidine 2% Cloths 1 Application(s) Topical <User Schedule>  danazol 200 milliGRAM(s) Oral three times a day  dextrose 5%. 1000 milliLiter(s) (50 mL/Hr) IV Continuous <Continuous>  dextrose 5%. 1000 milliLiter(s) (100 mL/Hr) IV Continuous <Continuous>  dextrose 50% Injectable 25 Gram(s) IV Push once  dextrose 50% Injectable 12.5 Gram(s) IV Push once  epoetin filiberto (PROCRIT) Injectable 13307 Unit(s) SubCutaneous <User Schedule>  gabapentin 100 milliGRAM(s) Oral daily  glucagon  Injectable 1 milliGRAM(s) IntraMuscular once  insulin lispro (ADMELOG) corrective regimen sliding scale   SubCutaneous three times a day before meals  insulin lispro (ADMELOG) corrective regimen sliding scale   SubCutaneous at bedtime  Nephro-deonna 1 Tablet(s) Oral daily  pantoprazole    Tablet 40 milliGRAM(s) Oral before breakfast  sodium chloride 0.9%. 1000 milliLiter(s) (20 mL/Hr) IV Continuous <Continuous>  sodium zirconium cyclosilicate 10 Gram(s) Oral three times a day    MEDICATIONS  (PRN):  acetaminophen     Tablet .. 650 milliGRAM(s) Oral every 6 hours PRN Temp greater or equal to 38C (100.4F), Mild Pain (1 - 3)  dextrose Oral Gel 15 Gram(s) Oral once PRN Blood Glucose LESS THAN 70 milliGRAM(s)/deciliter  melatonin 3 milliGRAM(s) Oral at bedtime PRN Insomnia  ondansetron Injectable 4 milliGRAM(s) IV Push every 8 hours PRN Nausea and/or Vomiting      Vital Signs Last 24 Hrs  T(C): 37 (24 Jul 2023 04:24), Max: 37.2 (23 Jul 2023 23:00)  T(F): 98.6 (24 Jul 2023 04:24), Max: 99 (23 Jul 2023 23:00)  HR: 71 (24 Jul 2023 04:24) (70 - 83)  BP: 123/74 (24 Jul 2023 04:24) (116/71 - 146/81)  BP(mean): --  RR: 18 (24 Jul 2023 04:24) (17 - 18)  SpO2: 95% (24 Jul 2023 04:24) (95% - 99%)    Parameters below as of 24 Jul 2023 04:24  Patient On (Oxygen Delivery Method): room air      CAPILLARY BLOOD GLUCOSE      POCT Blood Glucose.: 173 mg/dL (23 Jul 2023 21:20)  POCT Blood Glucose.: 176 mg/dL (23 Jul 2023 17:19)  POCT Blood Glucose.: 126 mg/dL (23 Jul 2023 13:31)  POCT Blood Glucose.: 141 mg/dL (23 Jul 2023 11:43)  POCT Blood Glucose.: 80 mg/dL (23 Jul 2023 07:41)    I&O's Summary    23 Jul 2023 07:01  -  24 Jul 2023 07:00  --------------------------------------------------------  IN: 720 mL / OUT: 0 mL / NET: 720 mL        PHYSICAL EXAM:  GENERAL: NAD, well-developed  HEAD:  Atraumatic, Normocephalic  EYES: EOMI, PERRLA, conjunctiva and sclera clear  NECK: Supple, No JVD  CHEST/LUNG: Clear to auscultation bilaterally; No wheeze  HEART: Regular rate and rhythm; No murmurs, rubs, or gallops  ABDOMEN: Soft, Nontender, Nondistended; Bowel sounds present  EXTREMITIES:  2+ Peripheral Pulses, No clubbing, cyanosis, or edema  PSYCH: AAOx3  NEUROLOGY: non-focal  SKIN: No rashes or lesions    LABS:                        7.6    4.43  )-----------( 58       ( 24 Jul 2023 05:01 )             23.5     07-24    135  |  103  |  44<H>  ----------------------------<  90  5.0   |  17<L>  |  10.43<H>    Ca    8.1<L>      24 Jul 2023 05:01  Phos  3.5     07-24  Mg     2.2     07-24    TPro  6.7  /  Alb  3.8  /  TBili  1.2  /  DBili  x   /  AST  14  /  ALT  11  /  AlkPhos  68  07-23    PT/INR - ( 22 Jul 2023 23:16 )   PT: 12.7 sec;   INR: 1.10 ratio         PTT - ( 22 Jul 2023 23:16 )  PTT:23.7 sec      Urinalysis Basic - ( 24 Jul 2023 05:01 )    Color: x / Appearance: x / SG: x / pH: x  Gluc: 90 mg/dL / Ketone: x  / Bili: x / Urobili: x   Blood: x / Protein: x / Nitrite: x   Leuk Esterase: x / RBC: x / WBC x   Sq Epi: x / Non Sq Epi: x / Bacteria: x        RADIOLOGY & ADDITIONAL TESTS:    Imaging Personally Reviewed:    Consultant(s) Notes Reviewed:      Care Discussed with Consultants/Other Providers:    Emmie Montgomery MD, Internal Medicine Resident     Patient is a 63y old  Female who presents with a chief complaint of clotted AVF and missed HD (23 Jul 2023 13:56)      SUBJECTIVE / OVERNIGHT EVENTS: Patient seen and examined at bedside. Overnight the pt required 2 units of blood, f/u post transfusion cbc. Currently asymptomatic and no signs of bleed. No acute complaints. Pt planned for IR thrombectomy and vascular access today, so has been NPO.     MEDICATIONS  (STANDING):  carvedilol 3.125 milliGRAM(s) Oral every 12 hours  chlorhexidine 2% Cloths 1 Application(s) Topical <User Schedule>  danazol 200 milliGRAM(s) Oral three times a day  dextrose 5%. 1000 milliLiter(s) (50 mL/Hr) IV Continuous <Continuous>  dextrose 5%. 1000 milliLiter(s) (100 mL/Hr) IV Continuous <Continuous>  dextrose 50% Injectable 25 Gram(s) IV Push once  dextrose 50% Injectable 12.5 Gram(s) IV Push once  epoetin filiberto (PROCRIT) Injectable 87983 Unit(s) SubCutaneous <User Schedule>  gabapentin 100 milliGRAM(s) Oral daily  glucagon  Injectable 1 milliGRAM(s) IntraMuscular once  insulin lispro (ADMELOG) corrective regimen sliding scale   SubCutaneous three times a day before meals  insulin lispro (ADMELOG) corrective regimen sliding scale   SubCutaneous at bedtime  Nephro-deonna 1 Tablet(s) Oral daily  pantoprazole    Tablet 40 milliGRAM(s) Oral before breakfast  sodium chloride 0.9%. 1000 milliLiter(s) (20 mL/Hr) IV Continuous <Continuous>  sodium zirconium cyclosilicate 10 Gram(s) Oral three times a day    MEDICATIONS  (PRN):  acetaminophen     Tablet .. 650 milliGRAM(s) Oral every 6 hours PRN Temp greater or equal to 38C (100.4F), Mild Pain (1 - 3)  dextrose Oral Gel 15 Gram(s) Oral once PRN Blood Glucose LESS THAN 70 milliGRAM(s)/deciliter  melatonin 3 milliGRAM(s) Oral at bedtime PRN Insomnia  ondansetron Injectable 4 milliGRAM(s) IV Push every 8 hours PRN Nausea and/or Vomiting      Vital Signs Last 24 Hrs  T(C): 37 (24 Jul 2023 04:24), Max: 37.2 (23 Jul 2023 23:00)  T(F): 98.6 (24 Jul 2023 04:24), Max: 99 (23 Jul 2023 23:00)  HR: 71 (24 Jul 2023 04:24) (70 - 83)  BP: 123/74 (24 Jul 2023 04:24) (116/71 - 146/81)  BP(mean): --  RR: 18 (24 Jul 2023 04:24) (17 - 18)  SpO2: 95% (24 Jul 2023 04:24) (95% - 99%)    Parameters below as of 24 Jul 2023 04:24  Patient On (Oxygen Delivery Method): room air      CAPILLARY BLOOD GLUCOSE      POCT Blood Glucose.: 173 mg/dL (23 Jul 2023 21:20)  POCT Blood Glucose.: 176 mg/dL (23 Jul 2023 17:19)  POCT Blood Glucose.: 126 mg/dL (23 Jul 2023 13:31)  POCT Blood Glucose.: 141 mg/dL (23 Jul 2023 11:43)  POCT Blood Glucose.: 80 mg/dL (23 Jul 2023 07:41)    I&O's Summary    23 Jul 2023 07:01  -  24 Jul 2023 07:00  --------------------------------------------------------  IN: 720 mL / OUT: 0 mL / NET: 720 mL        PHYSICAL EXAM:  GENERAL: NAD, well-developed  HEAD:  Atraumatic, Normocephalic  EYES: EOMI, PERRLA, conjunctiva and sclera clear  NECK: Supple, No JVD  CHEST/LUNG: Clear to auscultation bilaterally; No wheeze  HEART: Regular rate and rhythm; No murmurs, rubs, or gallops  ABDOMEN: Soft, Nontender, Nondistended; Bowel sounds present, +splenomegaly, +hepatomegaly  EXTREMITIES:  2+ Peripheral Pulses, No clubbing, cyanosis, or edema, +ttp on LUE clotted access site  PSYCH: AAOx3  NEUROLOGY: non-focal  SKIN: No rashes or lesions    LABS:                        7.6    4.43  )-----------( 58       ( 24 Jul 2023 05:01 )             23.5     07-24    135  |  103  |  44<H>  ----------------------------<  90  5.0   |  17<L>  |  10.43<H>    Ca    8.1<L>      24 Jul 2023 05:01  Phos  3.5     07-24  Mg     2.2     07-24    TPro  6.7  /  Alb  3.8  /  TBili  1.2  /  DBili  x   /  AST  14  /  ALT  11  /  AlkPhos  68  07-23    PT/INR - ( 22 Jul 2023 23:16 )   PT: 12.7 sec;   INR: 1.10 ratio         PTT - ( 22 Jul 2023 23:16 )  PTT:23.7 sec      Urinalysis Basic - ( 24 Jul 2023 05:01 )    Color: x / Appearance: x / SG: x / pH: x  Gluc: 90 mg/dL / Ketone: x  / Bili: x / Urobili: x   Blood: x / Protein: x / Nitrite: x   Leuk Esterase: x / RBC: x / WBC x   Sq Epi: x / Non Sq Epi: x / Bacteria: x        RADIOLOGY & ADDITIONAL TESTS:    Imaging Personally Reviewed:    Consultant(s) Notes Reviewed:      Care Discussed with Consultants/Other Providers:    Emmie Montgomery MD, Internal Medicine Resident

## 2023-07-24 NOTE — PROGRESS NOTE ADULT - ATTENDING COMMENTS
Patient is a 63 year old female, with PMH of myelofibrosis, polycythemia vera, ESRD on MWF schedule via LUE AVF, portal HTN c/b IGV1, subclavian port for transfusions, gastric varices, presents for clotted fistula and inability to undergo dialysis.    - patient with occlusive thrombus in AVF  - original plan for patient to be DC home and plan for outpatient thrombectomy at access center but potassium noted to be rising  - IR recs noted; original plan for intervention today but cancelled due to schedule/emergencies; will plan tentatively for tomorrow; keep NPO after MN    - nephro recs noted; lokelma TID for now; can consider lasix IV; repeat BMP later today   - f/u nephro regarding if patient needs to undergo HD today or can wait till tomorrow once thrombectomy done    - monitor BMP closely   - s/p 2 PRBC during hospital stay; Hgb >7 this morning; no active bleeding noted; possible ACD in setting of ESRD; monitor for now; transfuse if less than 7 and give dose of lasix afterwards to avoid fluid overload   - monitor CBC and BMP daily     Rest as above. Discussed with HS. Patient is a 63 year old female, with PMH of myelofibrosis, polycythemia vera, ESRD on MWF schedule via LUE AVF, portal HTN c/b IGV1, subclavian port for transfusions, gastric varices, presents for clotted fistula and inability to undergo dialysis.    - patient with occlusive thrombus in AVF  - original plan for patient to be DC home and plan for outpatient thrombectomy at access center but potassium noted to be rising  - IR recs noted; original plan for intervention today but cancelled due to schedule/emergencies; will plan tentatively for tomorrow; keep NPO after MN    - nephro recs noted; lokelma TID for now; can consider lasix IV; repeat BMP later today   - f/u nephro regarding if patient needs to undergo HD today or can wait till tomorrow once thrombectomy done    - monitor BMP closely   - s/p 2 PRBC during hospital stay; Hgb >7 this morning; no active bleeding noted; possible ACD in setting of ESRD; monitor for now; transfuse if less than 7 and give dose of lasix afterwards to avoid fluid overload   - heme f/u for history of myelofibrosis and polycythemia vera   - monitor CBC and BMP daily     Rest as above. Discussed with HS. Patient is a 63 year old female, with PMH of myelofibrosis, polycythemia vera, ESRD on MWF schedule via LUE AVF, portal HTN c/b IGV1, subclavian port for transfusions, gastric varices, presents for clotted fistula and inability to undergo dialysis.    - patient with occlusive thrombus in AVF  - original plan for patient to be DC home and plan for outpatient thrombectomy at access center but potassium noted to be rising  - IR recs noted; original plan for intervention today but cancelled due to schedule/emergencies; will plan tentatively for tomorrow; keep NPO after MN    - nephro recs noted; lokelma TID for now; can consider lasix IV; repeat BMP later today   - f/u nephro regarding if patient needs to undergo HD today or can wait till tomorrow once thrombectomy done    - monitor BMP closely   - s/p 2 PRBC during hospital stay; Hgb >7 this morning; no active bleeding noted; possible ACD in setting of ESRD; monitor for now; transfuse if less than 7 and give dose of lasix afterwards to avoid fluid overload   - heme f/u for history of myelofibrosis and polycythemia vera   - monitor CBC and BMP daily     Rest as above. Discussed with HS

## 2023-07-24 NOTE — PROVIDER CONTACT NOTE (CRITICAL VALUE NOTIFICATION) - ASSESSMENT
Pt A&Ox4, denies SOB, dizziness. Pt admits to feeling fatigued. VSS. No S/S of active bleeding.
Pt A&Ox4, denies SOB, dizziness. VSS. No S/S of active bleeding.

## 2023-07-25 LAB
ALBUMIN SERPL ELPH-MCNC: 3.9 G/DL — SIGNIFICANT CHANGE UP (ref 3.3–5)
ALP SERPL-CCNC: 73 U/L — SIGNIFICANT CHANGE UP (ref 40–120)
ALT FLD-CCNC: 10 U/L — SIGNIFICANT CHANGE UP (ref 10–45)
ANION GAP SERPL CALC-SCNC: 14 MMOL/L — SIGNIFICANT CHANGE UP (ref 5–17)
ANION GAP SERPL CALC-SCNC: 17 MMOL/L — SIGNIFICANT CHANGE UP (ref 5–17)
AST SERPL-CCNC: 13 U/L — SIGNIFICANT CHANGE UP (ref 10–40)
BASOPHILS # BLD AUTO: 0.04 K/UL — SIGNIFICANT CHANGE UP (ref 0–0.2)
BASOPHILS NFR BLD AUTO: 0.9 % — SIGNIFICANT CHANGE UP (ref 0–2)
BILIRUB SERPL-MCNC: 1.3 MG/DL — HIGH (ref 0.2–1.2)
BUN SERPL-MCNC: 50 MG/DL — HIGH (ref 7–23)
BUN SERPL-MCNC: 51 MG/DL — HIGH (ref 7–23)
CALCIUM SERPL-MCNC: 8.2 MG/DL — LOW (ref 8.4–10.5)
CALCIUM SERPL-MCNC: 8.3 MG/DL — LOW (ref 8.4–10.5)
CHLORIDE SERPL-SCNC: 101 MMOL/L — SIGNIFICANT CHANGE UP (ref 96–108)
CHLORIDE SERPL-SCNC: 103 MMOL/L — SIGNIFICANT CHANGE UP (ref 96–108)
CO2 SERPL-SCNC: 17 MMOL/L — LOW (ref 22–31)
CO2 SERPL-SCNC: 19 MMOL/L — LOW (ref 22–31)
CREAT SERPL-MCNC: 11.42 MG/DL — HIGH (ref 0.5–1.3)
CREAT SERPL-MCNC: 11.66 MG/DL — HIGH (ref 0.5–1.3)
EGFR: 3 ML/MIN/1.73M2 — LOW
EGFR: 3 ML/MIN/1.73M2 — LOW
EOSINOPHIL # BLD AUTO: 0.1 K/UL — SIGNIFICANT CHANGE UP (ref 0–0.5)
EOSINOPHIL NFR BLD AUTO: 2.6 % — SIGNIFICANT CHANGE UP (ref 0–6)
GIANT PLATELETS BLD QL SMEAR: PRESENT — SIGNIFICANT CHANGE UP
GLUCOSE BLDC GLUCOMTR-MCNC: 74 MG/DL — SIGNIFICANT CHANGE UP (ref 70–99)
GLUCOSE SERPL-MCNC: 204 MG/DL — HIGH (ref 70–99)
GLUCOSE SERPL-MCNC: 97 MG/DL — SIGNIFICANT CHANGE UP (ref 70–99)
HCT VFR BLD CALC: 24.8 % — LOW (ref 34.5–45)
HGB BLD-MCNC: 8.1 G/DL — LOW (ref 11.5–15.5)
LYMPHOCYTES # BLD AUTO: 0.42 K/UL — LOW (ref 1–3.3)
LYMPHOCYTES # BLD AUTO: 10.5 % — LOW (ref 13–44)
MAGNESIUM SERPL-MCNC: 2.3 MG/DL — SIGNIFICANT CHANGE UP (ref 1.6–2.6)
MANUAL SMEAR VERIFICATION: SIGNIFICANT CHANGE UP
MCHC RBC-ENTMCNC: 30.2 PG — SIGNIFICANT CHANGE UP (ref 27–34)
MCHC RBC-ENTMCNC: 32.7 GM/DL — SIGNIFICANT CHANGE UP (ref 32–36)
MCV RBC AUTO: 92.5 FL — SIGNIFICANT CHANGE UP (ref 80–100)
METAMYELOCYTES # FLD: 0.9 % — HIGH (ref 0–0)
MONOCYTES # BLD AUTO: 0.14 K/UL — SIGNIFICANT CHANGE UP (ref 0–0.9)
MONOCYTES NFR BLD AUTO: 3.5 % — SIGNIFICANT CHANGE UP (ref 2–14)
NEUTROPHILS # BLD AUTO: 3.25 K/UL — SIGNIFICANT CHANGE UP (ref 1.8–7.4)
NEUTROPHILS NFR BLD AUTO: 81.6 % — HIGH (ref 43–77)
NRBC # BLD: 1 /100 — HIGH (ref 0–0)
PHOSPHATE SERPL-MCNC: 4 MG/DL — SIGNIFICANT CHANGE UP (ref 2.5–4.5)
PLAT MORPH BLD: NORMAL — SIGNIFICANT CHANGE UP
PLATELET # BLD AUTO: 62 K/UL — LOW (ref 150–400)
POTASSIUM SERPL-MCNC: 4.4 MMOL/L — SIGNIFICANT CHANGE UP (ref 3.5–5.3)
POTASSIUM SERPL-MCNC: 4.7 MMOL/L — SIGNIFICANT CHANGE UP (ref 3.5–5.3)
POTASSIUM SERPL-SCNC: 4.4 MMOL/L — SIGNIFICANT CHANGE UP (ref 3.5–5.3)
POTASSIUM SERPL-SCNC: 4.7 MMOL/L — SIGNIFICANT CHANGE UP (ref 3.5–5.3)
PROT SERPL-MCNC: 7 G/DL — SIGNIFICANT CHANGE UP (ref 6–8.3)
RBC # BLD: 2.68 M/UL — LOW (ref 3.8–5.2)
RBC # FLD: 19.3 % — HIGH (ref 10.3–14.5)
RBC BLD AUTO: SIGNIFICANT CHANGE UP
SODIUM SERPL-SCNC: 134 MMOL/L — LOW (ref 135–145)
SODIUM SERPL-SCNC: 137 MMOL/L — SIGNIFICANT CHANGE UP (ref 135–145)
WBC # BLD: 3.98 K/UL — SIGNIFICANT CHANGE UP (ref 3.8–10.5)
WBC # FLD AUTO: 3.98 K/UL — SIGNIFICANT CHANGE UP (ref 3.8–10.5)

## 2023-07-25 PROCEDURE — 36904 THRMBC/NFS DIALYSIS CIRCUIT: CPT

## 2023-07-25 PROCEDURE — 99233 SBSQ HOSP IP/OBS HIGH 50: CPT | Mod: GC

## 2023-07-25 PROCEDURE — 36140 INTRO NDL ICATH UPR/LXTR ART: CPT | Mod: 59

## 2023-07-25 PROCEDURE — 75710 ARTERY X-RAYS ARM/LEG: CPT | Mod: 26,LT

## 2023-07-25 RX ORDER — ONDANSETRON 8 MG/1
4 TABLET, FILM COATED ORAL ONCE
Refills: 0 | Status: DISCONTINUED | OUTPATIENT
Start: 2023-07-25 | End: 2023-07-26

## 2023-07-25 RX ORDER — SODIUM CHLORIDE 9 MG/ML
1000 INJECTION, SOLUTION INTRAVENOUS
Refills: 0 | Status: DISCONTINUED | OUTPATIENT
Start: 2023-07-25 | End: 2023-07-26

## 2023-07-25 RX ORDER — FENTANYL CITRATE 50 UG/ML
12.5 INJECTION INTRAVENOUS
Refills: 0 | Status: DISCONTINUED | OUTPATIENT
Start: 2023-07-25 | End: 2023-07-26

## 2023-07-25 RX ORDER — ALTEPLASE 100 MG
2 KIT INTRAVENOUS ONCE
Refills: 0 | Status: COMPLETED | OUTPATIENT
Start: 2023-07-25 | End: 2023-07-25

## 2023-07-25 RX ORDER — ERYTHROPOIETIN 10000 [IU]/ML
10000 INJECTION, SOLUTION INTRAVENOUS; SUBCUTANEOUS
Refills: 0 | Status: DISCONTINUED | OUTPATIENT
Start: 2023-07-25 | End: 2023-07-27

## 2023-07-25 RX ORDER — HYDROMORPHONE HYDROCHLORIDE 2 MG/ML
0.25 INJECTION INTRAMUSCULAR; INTRAVENOUS; SUBCUTANEOUS
Refills: 0 | Status: DISCONTINUED | OUTPATIENT
Start: 2023-07-25 | End: 2023-07-26

## 2023-07-25 RX ADMIN — LIDOCAINE 1 PATCH: 4 CREAM TOPICAL at 00:00

## 2023-07-25 RX ADMIN — ALTEPLASE 2 MILLIGRAM(S): KIT at 05:55

## 2023-07-25 RX ADMIN — ERYTHROPOIETIN 10000 UNIT(S): 10000 INJECTION, SOLUTION INTRAVENOUS; SUBCUTANEOUS at 19:12

## 2023-07-25 RX ADMIN — GABAPENTIN 100 MILLIGRAM(S): 400 CAPSULE ORAL at 22:54

## 2023-07-25 RX ADMIN — DANAZOL 200 MILLIGRAM(S): 200 CAPSULE ORAL at 05:16

## 2023-07-25 RX ADMIN — Medication 1 TABLET(S): at 11:21

## 2023-07-25 RX ADMIN — LIDOCAINE 1 PATCH: 4 CREAM TOPICAL at 23:00

## 2023-07-25 RX ADMIN — Medication 40 MILLIGRAM(S): at 05:54

## 2023-07-25 RX ADMIN — LIDOCAINE 1 PATCH: 4 CREAM TOPICAL at 11:25

## 2023-07-25 RX ADMIN — CARVEDILOL PHOSPHATE 3.12 MILLIGRAM(S): 80 CAPSULE, EXTENDED RELEASE ORAL at 05:16

## 2023-07-25 RX ADMIN — CARVEDILOL PHOSPHATE 3.12 MILLIGRAM(S): 80 CAPSULE, EXTENDED RELEASE ORAL at 22:54

## 2023-07-25 RX ADMIN — CHLORHEXIDINE GLUCONATE 1 APPLICATION(S): 213 SOLUTION TOPICAL at 05:22

## 2023-07-25 RX ADMIN — DANAZOL 200 MILLIGRAM(S): 200 CAPSULE ORAL at 13:09

## 2023-07-25 RX ADMIN — DANAZOL 200 MILLIGRAM(S): 200 CAPSULE ORAL at 22:53

## 2023-07-25 RX ADMIN — Medication 3 MILLIGRAM(S): at 22:57

## 2023-07-25 RX ADMIN — ALTEPLASE 2 MILLIGRAM(S): KIT at 04:45

## 2023-07-25 NOTE — PROGRESS NOTE ADULT - ASSESSMENT
63F w/  myelofibrosis, polycythemia vera, ESRD on MWF schedule via LUE AVF, portal HTN c/b IGV1, subclavian port for transfusions, gastric varices, presents for clotted fistula and inability to undergo dialysis, pending access and thrombectomy per IR to resume HD  63F w/  myelofibrosis, polycythemia vera, ESRD on MWF schedule via LUE AVF, portal HTN c/b IGV1, subclavian port for transfusions, gastric varices, presents for clotted fistula and inability to undergo dialysis, pending access and thrombectomy by IR today to resume HD

## 2023-07-25 NOTE — PROGRESS NOTE ADULT - PROBLEM SELECTOR PLAN 1
HD MWF, last HD session Wednesday 7/19, has not been getting HD due to AV fistula occlusion. Initially hyperkalemic, improved to normal range with lokelma. Na 137, K 4.7, SCr 11.66 (9.53 on admission, gradually uptrending), BUN 51. Phos 4.0. Euvolemic on exam, vitals stable.  - thrombectomy with IR today  - HD MWF - will do HD after thrombectomy today 7/25 given missed sessions ISO fistula occlusion  - If fistula not salvageable, would need permacath placement along with new AVF - will f/u IR recs after intervention today   - no need for additional diuretics today, no urine output after lasix 40 mg IV yesterday.

## 2023-07-25 NOTE — PROVIDER CONTACT NOTE (OTHER) - SITUATION
No Blood Return when withdrawing from Right Chest Medaport.
Pt c/o mild itchiness around chin 1hr s/p 1U PRBC

## 2023-07-25 NOTE — PROGRESS NOTE ADULT - PROBLEM SELECTOR PLAN 4
-consulted heme onc regarding Jakafi   - trend platelets  -Plt downtrending to 58, likely secondary to uremia,  -Per IR would like >50 for procedure, will monitor -consulted heme onc regarding Jakafi   - trend platelets  -Plt downtrending to 58, likely secondary to uremia,  -Per IR would like >50 for procedure, platelets 62 on 7/25

## 2023-07-25 NOTE — PROGRESS NOTE ADULT - ATTENDING COMMENTS
Patient is a 63 year old female, with PMH of myelofibrosis, polycythemia vera, ESRD on MWF schedule via LUE AVF, portal HTN c/b IGV1, subclavian port for transfusions, gastric varices, presents for clotted fistula and inability to undergo dialysis.    - patient with occlusive thrombus in AVF  - original plan for patient to be DC home and plan for outpatient thrombectomy at access center but potassium noted to be rising  - IR recs noted; original plan for intervention 7/24 but cancelled due to schedule/emergencies; planned for today 7/25; NPO for now; f/u IR regarding timing of procedure     - nephro recs noted; s/p lokelma TID; potassium within normal limits currently; continue with IV lasix    - f/u nephro regarding HD session once done with thrombectomy     - monitor BMP closely   - s/p 2 PRBC during hospital stay; Hgb 8.1 this morning; no active bleeding noted; possible ACD in setting of ESRD; monitor for now; transfuse if less than 7 and give dose of lasix afterwards to avoid fluid overload   - heme f/u for history of myelofibrosis and polycythemia vera   - monitor CBC and BMP daily     Rest as above. Discussed with HS Patient is a 63 year old female, with PMH of myelofibrosis, polycythemia vera, ESRD on MWF schedule via LUE AVF, portal HTN c/b IGV1, subclavian port for transfusions, gastric varices, presents for clotted fistula and inability to undergo dialysis.    - patient with occlusive thrombus in AVF  - original plan for patient to be DC home and plan for outpatient thrombectomy at access center but potassium noted to be rising  - IR recs noted; original plan for intervention 7/24 but cancelled due to schedule/emergencies; planned for today 7/25; NPO for now; f/u IR regarding timing of procedure     - nephro recs noted; s/p lokelma TID; potassium within normal limits currently; continue with IV lasix    - f/u nephro regarding HD session once done with thrombectomy     - monitor BMP closely   - s/p 2 PRBC during hospital stay; Hgb 8.1 this morning; no active bleeding noted; possible ACD in setting of ESRD; monitor for now; transfuse if less than 7 and give dose of lasix afterwards to avoid fluid overload   - heme f/u for history of myelofibrosis and polycythemia vera   - monitor CBC and BMP daily     Rest as above. Discussed with HS.

## 2023-07-25 NOTE — PROGRESS NOTE ADULT - SUBJECTIVE AND OBJECTIVE BOX
[facility] DIVISION OF KIDNEY DISEASES AND HYPERTENSION   FOLLOW UP NOTE    --------------------------------------------------------------------------------  Chief Complaint:    24 hour events/subjective:  - NPO since midnight for thrombectomy today  - feeling fine this AM, endorses continued pain at fistula site. Denies dyspnea, orthopnea, chest pain, abdominal distention, abdominal pain, n/v/d, peripheral edema.   - s/p lasix 40 mg IV 7/24 with no UOP overnight    PAST HISTORY  --------------------------------------------------------------------------------  No significant changes to PMH, PSH, FHx, SHx, unless otherwise noted    ALLERGIES & MEDICATIONS  --------------------------------------------------------------------------------  Allergies    No Known Allergies    Intolerances      Standing Inpatient Medications  carvedilol 3.125 milliGRAM(s) Oral every 12 hours  chlorhexidine 2% Cloths 1 Application(s) Topical <User Schedule>  danazol 200 milliGRAM(s) Oral three times a day  furosemide   Injectable 40 milliGRAM(s) IV Push daily  gabapentin 100 milliGRAM(s) Oral daily  lidocaine   4% Patch 1 Patch Transdermal daily  Nephro-deonna 1 Tablet(s) Oral daily  pantoprazole    Tablet 40 milliGRAM(s) Oral before breakfast  sodium chloride 0.9%. 1000 milliLiter(s) IV Continuous <Continuous>    PRN Inpatient Medications  acetaminophen     Tablet .. 650 milliGRAM(s) Oral every 6 hours PRN  melatonin 3 milliGRAM(s) Oral at bedtime PRN  ondansetron Injectable 4 milliGRAM(s) IV Push every 8 hours PRN      REVIEW OF SYSTEMS  --------------------------------------------------------------------------------  Gen: No fevers/chills  Head/Eyes/Ears: No HA   Respiratory: No dyspnea, cough  CV: No chest pain  GI: No abdominal pain, diarrhea  : No dysuria, hematuria  MSK: No  edema  Skin: No rashes  Heme: No easy bruising or bleeding    All other systems were reviewed and are negative, except as noted.    VITALS/PHYSICAL EXAM  --------------------------------------------------------------------------------  T(C): 36.6 (07-25-23 @ 13:16), Max: 36.9 (07-25-23 @ 04:00)  HR: 79 (07-25-23 @ 13:16) (74 - 79)  BP: 148/94 (07-25-23 @ 13:16) (124/76 - 148/94)  RR: 18 (07-25-23 @ 13:16) (17 - 18)  SpO2: 97% (07-25-23 @ 13:16) (95% - 98%)  Wt(kg): --  Height (cm): 157.5 (07-25-23 @ 13:16)  Weight (kg): 56.7 (07-25-23 @ 13:16)  BMI (kg/m2): 22.9 (07-25-23 @ 13:16)  BSA (m2): 1.57 (07-25-23 @ 13:16)      07-24-23 @ 07:01  -  07-25-23 @ 07:00  --------------------------------------------------------  IN: 270 mL / OUT: 0 mL / NET: 270 mL    07-25-23 @ 07:01  -  07-25-23 @ 14:02  --------------------------------------------------------  IN: 0 mL / OUT: 0 mL / NET: 0 mL        Physical Exam:  PHYSICAL EXAM: vital signs as above  Gen: in no apparent distress  Neck: Supple, no JVD  Lungs: no rhonchi, no wheeze, no crackles  CVS: S1 S2 no M/R/G  Abdomen: no tenderness, no organomegaly, BS present  Neuro: Grossly intact  Skin: warm, dry  Ext: no cyanosis or clubbing, no edema  Access: AVF with no bruit/No thrill    LABS/STUDIES  --------------------------------------------------------------------------------              8.1    3.98  >-----------<  62       [07-25-23 @ 06:01]              24.8     137  |  103  |  51  ----------------------------<  97      [07-25-23 @ 06:01]  4.7   |  17  |  11.66        Ca     8.3     [07-25-23 @ 06:01]      Mg     2.3     [07-25-23 @ 06:01]      Phos  4.0     [07-25-23 @ 06:01]    TPro  7.0  /  Alb  3.9  /  TBili  1.3  /  DBili  x   /  AST  13  /  ALT  10  /  AlkPhos  73  [07-25-23 @ 06:01]      Creatinine Trend:  SCr 11.66 [07-25 @ 06:01]  SCr 11.42 [07-25 @ 02:27]  SCr 11.03 [07-24 @ 15:07]  SCr 10.43 [07-24 @ 05:01]  SCr 9.84 [07-23 @ 20:39]    Urinalysis - [07-25-23 @ 06:01]      Color  / Appearance  / SG  / pH       Gluc 97 / Ketone   / Bili  / Urobili        Blood  / Protein  / Leuk Est  / Nitrite       RBC  / WBC  / Hyaline  / Gran  / Sq Epi  / Non Sq Epi  / Bacteria           Tacrolimus  Cyclosporine  Sirolimus  Mycophenolate  BK PCR  CMV PCR  Parvo PCR  EBV PCR

## 2023-07-25 NOTE — PRE-ANESTHESIA EVALUATION ADULT - NSANTHPMHFT_GEN_ALL_CORE
63F w/  myelofibrosis, polycythemia vera, ESRD on MWF schedule via LUE AVF, portal HTN c/b IGV1, subclavian port for transfusions, gastric varices, presents for clotted fistula and inability to undergo dialysis. s/p tPA injection, now for attempted thrombolysis

## 2023-07-25 NOTE — PROVIDER CONTACT NOTE (OTHER) - ACTION/TREATMENT ORDERED:
Resident Deangelo Whelan notified. Will continue to monitor. Pt safety maintained.
Provider assessed by bedside. Provider ordered Cathflo.

## 2023-07-25 NOTE — PROGRESS NOTE ADULT - PROBLEM SELECTOR PLAN 1
-Vascular Surgery consult appreciated,   - pending IR for thrombectomy, pt not on the schedule 7/24 but planned for 7/25 -Vascular Surgery consult appreciated,   - pending IR for thrombectomy, planned for 7/25 (pt on schedule)

## 2023-07-25 NOTE — PROCEDURE NOTE - PROCEDURE FINDINGS AND DETAILS
technically successful US and fluoroscopic guided LUE AVF thrombolysis/thrombectomy with angioplasty. post declot angio demonstrates brisk flow through the fistula. a strong palpable thrill was appreciated at case termination. pursestring sutures were placed at the access sites.

## 2023-07-25 NOTE — PRE PROCEDURE NOTE - PRE PROCEDURE EVALUATION
Vascular & Interventional Radiology Pre-Procedure Note    Procedure Name: left upper extremity AVF thrombolysis/thrombectomy, possible angioplasty, possible stent placement    HPI: 63y Female with ESRD with LUE AVF noted to be thrombosed presents for thrombolysis/thrombectomy, angioplasty, and possible stent placement.     Allergies: No Known Allergies      Medications:   alteplase for catheter clearance: 2 milliGRAM(s) Catheter (07-25 @ 04:45)  alteplase for catheter clearance: 2 milliGRAM(s) Catheter (07-25 @ 05:55)  carvedilol: 3.125 milliGRAM(s) Oral (07-25 @ 05:16)  furosemide   Injectable: 40 milliGRAM(s) IV Push (07-25 @ 05:54)  furosemide   Injectable: 40 milliGRAM(s) IV Push (07-24 @ 19:19)      Data:  Vital Signs Last 24 Hrs  T(C): 36.6 (25 Jul 2023 13:16), Max: 36.9 (25 Jul 2023 04:00)  T(F): 97.8 (25 Jul 2023 13:16), Max: 98.5 (25 Jul 2023 04:00)  HR: 79 (25 Jul 2023 13:16) (74 - 79)  BP: 148/94 (25 Jul 2023 13:16) (124/76 - 148/94)  BP(mean): --  RR: 18 (25 Jul 2023 13:16) (17 - 18)  SpO2: 97% (25 Jul 2023 13:16) (95% - 98%)    Parameters below as of 25 Jul 2023 13:15  Patient On (Oxygen Delivery Method): room air        LABS:                        8.1    3.98  )-----------( 62       ( 25 Jul 2023 06:01 )             24.8     07-25    137  |  103  |  51<H>  ----------------------------<  97  4.7   |  17<L>  |  11.66<H>    Ca    8.3<L>      25 Jul 2023 06:01  Phos  4.0     07-25  Mg     2.3     07-25    TPro  7.0  /  Alb  3.9  /  TBili  1.3<H>  /  DBili  x   /  AST  13  /  ALT  10  /  AlkPhos  73  07-25        Plan:   -63y Female presents for left upper extremity AVF thrombolysis/thrombectomy, possible angioplasty, possible stent placement  -Risks/Benefits/alternatives explained with the patient and witnessed informed consent obtained.

## 2023-07-25 NOTE — PROGRESS NOTE ADULT - PROBLEM SELECTOR PLAN 7
- heme/onc consult to be arranged by day team   - hold Derik until further evaluated by heme/onc  -f/u heme onc recs

## 2023-07-25 NOTE — PROGRESS NOTE ADULT - PROBLEM SELECTOR PLAN 3
no active bleeding , required transfusions in past and h/o iron overload , will recheck CBC to confirm , if remains below 7 on repeat can transfuse 1 U PRBCS   - f/u repeat CBC   - transfuse PRBC 1 U if hgb < 7 , no active bleeding , required transfusions in past and h/o iron overload   -Hb currently stable at 8.1  -f/u Hb daily   - transfuse PRBC 1 U if hgb < 7 and give a dose of lasix afterwards to avoid overload

## 2023-07-25 NOTE — PROVIDER CONTACT NOTE (OTHER) - ASSESSMENT
Pt A&Ox4, s/p 1U of PRBC. VSS, No s/s of work of breathing or respiratory distress. No signs of rash, hives, or discoloration. Pt states, "feels fine" now.
Pt is alert & oriented x4. Pt is on room air. Pt has no complaints of palpitations/chest pain/SOB. VSS at this time.
No

## 2023-07-25 NOTE — PROVIDER CONTACT NOTE (OTHER) - REASON
No Blood Return when withdrawing from Right Chest Medaport
Pt c/o mild itchiness around chin 1hr s/p 1U PRBC.

## 2023-07-25 NOTE — PROGRESS NOTE ADULT - PROBLEM SELECTOR PLAN 2
mild hyperkalemia , no other indication for urgent HD , will check EKG , if no changes can treat pablito/ Lokelma   - Renal consult - to be arranged by day team   - renal diet  - trend serum K : 5.0 on 7/24  - Kayexalate   - monitor phos  - nephro deonna mild hyperkalemia , no other indication for urgent HD , will check EKG , if no changes can treat w/ Lokelma   - Renal following  - renal diet  - trend serum K : 5.0 on 7/24, 4.7 on 7/25 AM  - Kayexalate   - monitor phos  - nephro deonna  - f/u with nephrology about HD once pt has gotten IR thrombectomy  -continue to monitor BMP closely

## 2023-07-25 NOTE — PROGRESS NOTE ADULT - ATTENDING COMMENTS
62 y/o ESRD patient here for a clotted AVF      #ESRD: On HD. HD MWF. Last session was on Wednesday ( 7/19). Once access is declotted will schedule for HD today  AM labs reviewed. K and volume status stable  #Access: Thrombosed AVF. For thrombectomy via IR today. If unable to do thrombectomy, she will need PC placement along with a new AVF . Discussed with vascular/IR   #hyperkalemia: Now resolved. Received lokelma yesetrday  #MBD: Ca marginally low, Phos OK.    #HTN/ Volume: BP stable. Euvolemic on exam   #anemia : Hg  low. Will give INDER with HD once access is put in   If pRBC needed, would give with HD   monitor hb trend    Rest as per Dr. Manuel Zhou MD  O: [phone]  Contact me on teams

## 2023-07-25 NOTE — PROGRESS NOTE ADULT - SUBJECTIVE AND OBJECTIVE BOX
Patient is a 63y old  Female who presents with a chief complaint of clotted AVF and missed HD (24 Jul 2023 17:16)      SUBJECTIVE / OVERNIGHT EVENTS: Patient seen and examined at bedside.    MEDICATIONS  (STANDING):  carvedilol 3.125 milliGRAM(s) Oral every 12 hours  chlorhexidine 2% Cloths 1 Application(s) Topical <User Schedule>  danazol 200 milliGRAM(s) Oral three times a day  furosemide   Injectable 40 milliGRAM(s) IV Push daily  gabapentin 100 milliGRAM(s) Oral daily  lidocaine   4% Patch 1 Patch Transdermal daily  Nephro-deonna 1 Tablet(s) Oral daily  pantoprazole    Tablet 40 milliGRAM(s) Oral before breakfast  sodium chloride 0.9%. 1000 milliLiter(s) (20 mL/Hr) IV Continuous <Continuous>    MEDICATIONS  (PRN):  acetaminophen     Tablet .. 650 milliGRAM(s) Oral every 6 hours PRN Temp greater or equal to 38C (100.4F), Mild Pain (1 - 3)  melatonin 3 milliGRAM(s) Oral at bedtime PRN Insomnia  ondansetron Injectable 4 milliGRAM(s) IV Push every 8 hours PRN Nausea and/or Vomiting      Vital Signs Last 24 Hrs  T(C): 36.9 (25 Jul 2023 04:00), Max: 36.9 (24 Jul 2023 12:03)  T(F): 98.5 (25 Jul 2023 04:00), Max: 98.5 (25 Jul 2023 04:00)  HR: 74 (25 Jul 2023 04:00) (74 - 79)  BP: 126/65 (25 Jul 2023 04:00) (123/77 - 142/72)  BP(mean): --  RR: 17 (25 Jul 2023 04:00) (17 - 18)  SpO2: 96% (25 Jul 2023 04:00) (95% - 98%)    Parameters below as of 25 Jul 2023 04:00  Patient On (Oxygen Delivery Method): room air      CAPILLARY BLOOD GLUCOSE      POCT Blood Glucose.: 243 mg/dL (24 Jul 2023 21:33)  POCT Blood Glucose.: 186 mg/dL (24 Jul 2023 17:25)  POCT Blood Glucose.: 91 mg/dL (24 Jul 2023 11:59)  POCT Blood Glucose.: 96 mg/dL (24 Jul 2023 07:38)    I&O's Summary    24 Jul 2023 07:01  -  25 Jul 2023 07:00  --------------------------------------------------------  IN: 270 mL / OUT: 0 mL / NET: 270 mL        PHYSICAL EXAM:  GENERAL: NAD, well-developed  HEAD:  Atraumatic, Normocephalic  EYES: EOMI, PERRLA, conjunctiva and sclera clear  NECK: Supple, No JVD  CHEST/LUNG: Clear to auscultation bilaterally; No wheeze  HEART: Regular rate and rhythm; No murmurs, rubs, or gallops  ABDOMEN: Soft, Nontender, Nondistended; Bowel sounds present  EXTREMITIES:  2+ Peripheral Pulses, No clubbing, cyanosis, or edema  PSYCH: AAOx3  NEUROLOGY: non-focal  SKIN: No rashes or lesions    LABS:                        8.1    3.98  )-----------( 63       ( 25 Jul 2023 06:01 )             24.8     07-25    137  |  103  |  51<H>  ----------------------------<  97  4.7   |  17<L>  |  11.66<H>    Ca    8.3<L>      25 Jul 2023 06:01  Phos  4.0     07-25  Mg     2.3     07-25    TPro  7.0  /  Alb  3.9  /  TBili  1.3<H>  /  DBili  x   /  AST  13  /  ALT  10  /  AlkPhos  73  07-25          Urinalysis Basic - ( 25 Jul 2023 06:01 )    Color: x / Appearance: x / SG: x / pH: x  Gluc: 97 mg/dL / Ketone: x  / Bili: x / Urobili: x   Blood: x / Protein: x / Nitrite: x   Leuk Esterase: x / RBC: x / WBC x   Sq Epi: x / Non Sq Epi: x / Bacteria: x        RADIOLOGY & ADDITIONAL TESTS:    Imaging Personally Reviewed:    Consultant(s) Notes Reviewed:      Care Discussed with Consultants/Other Providers:    Emmie Montgomery MD, Internal Medicine Resident     Patient is a 63y old  Female who presents with a chief complaint of clotted AVF and missed HD (24 Jul 2023 17:16)      SUBJECTIVE / OVERNIGHT EVENTS: Patient seen and examined at bedside. No acute events overnight. Per night team, there was difficulty drawing labs from pt's port site, so they pushed cathflo x2 and were able to draw labs. Pt complaining of some fatigue but denies chest pain, palpitations, abdominal pain, dizziness. Pt was able to produce good urine output after the Lasix she received on 7/24. Pt pending IR thrombectomy and access placement for HD today    MEDICATIONS  (STANDING):  carvedilol 3.125 milliGRAM(s) Oral every 12 hours  chlorhexidine 2% Cloths 1 Application(s) Topical <User Schedule>  danazol 200 milliGRAM(s) Oral three times a day  furosemide   Injectable 40 milliGRAM(s) IV Push daily  gabapentin 100 milliGRAM(s) Oral daily  lidocaine   4% Patch 1 Patch Transdermal daily  Nephro-deonna 1 Tablet(s) Oral daily  pantoprazole    Tablet 40 milliGRAM(s) Oral before breakfast  sodium chloride 0.9%. 1000 milliLiter(s) (20 mL/Hr) IV Continuous <Continuous>    MEDICATIONS  (PRN):  acetaminophen     Tablet .. 650 milliGRAM(s) Oral every 6 hours PRN Temp greater or equal to 38C (100.4F), Mild Pain (1 - 3)  melatonin 3 milliGRAM(s) Oral at bedtime PRN Insomnia  ondansetron Injectable 4 milliGRAM(s) IV Push every 8 hours PRN Nausea and/or Vomiting      Vital Signs Last 24 Hrs  T(C): 36.9 (25 Jul 2023 04:00), Max: 36.9 (24 Jul 2023 12:03)  T(F): 98.5 (25 Jul 2023 04:00), Max: 98.5 (25 Jul 2023 04:00)  HR: 74 (25 Jul 2023 04:00) (74 - 79)  BP: 126/65 (25 Jul 2023 04:00) (123/77 - 142/72)  BP(mean): --  RR: 17 (25 Jul 2023 04:00) (17 - 18)  SpO2: 96% (25 Jul 2023 04:00) (95% - 98%)    Parameters below as of 25 Jul 2023 04:00  Patient On (Oxygen Delivery Method): room air      CAPILLARY BLOOD GLUCOSE      POCT Blood Glucose.: 243 mg/dL (24 Jul 2023 21:33)  POCT Blood Glucose.: 186 mg/dL (24 Jul 2023 17:25)  POCT Blood Glucose.: 91 mg/dL (24 Jul 2023 11:59)  POCT Blood Glucose.: 96 mg/dL (24 Jul 2023 07:38)    I&O's Summary    24 Jul 2023 07:01  -  25 Jul 2023 07:00  --------------------------------------------------------  IN: 270 mL / OUT: 0 mL / NET: 270 mL        PHYSICAL EXAM:  GENERAL: No acute distress, well-developed  HEAD:  Atraumatic, Normocephalic  ENT: EOMI, PERRLA, conjunctiva and sclera clear,  moist mucosa no pharyngeal erythema or exudates   NECK: supple , no JVD   CHEST/LUNG: Clear to auscultation bilaterally; No wheeze, equal breath sounds bilaterally   BACK: No spinal tenderness,  No CVA tenderness   HEART: Regular rate and rhythm; No murmurs, rubs, or gallops  ABDOMEN: Soft, Nontender, Nondistended; Bowel sounds present  EXTREMITIES: LUE AVF w/ minimal thrill ,   No clubbing, cyanosis, or edema  MSK: No joint swelling or effusions, ROM intact   PSYCH: Normal behavior/affect  NEUROLOGY: AAOx3, non-focal, cranial nerves intact  SKIN: Normal color, No rashes or lesions  LABS:                        8.1    3.98  )-----------( 63       ( 25 Jul 2023 06:01 )             24.8     07-25    137  |  103  |  51<H>  ----------------------------<  97  4.7   |  17<L>  |  11.66<H>    Ca    8.3<L>      25 Jul 2023 06:01  Phos  4.0     07-25  Mg     2.3     07-25    TPro  7.0  /  Alb  3.9  /  TBili  1.3<H>  /  DBili  x   /  AST  13  /  ALT  10  /  AlkPhos  73  07-25          Urinalysis Basic - ( 25 Jul 2023 06:01 )    Color: x / Appearance: x / SG: x / pH: x  Gluc: 97 mg/dL / Ketone: x  / Bili: x / Urobili: x   Blood: x / Protein: x / Nitrite: x   Leuk Esterase: x / RBC: x / WBC x   Sq Epi: x / Non Sq Epi: x / Bacteria: x        RADIOLOGY & ADDITIONAL TESTS:    Imaging Personally Reviewed:    Consultant(s) Notes Reviewed:      Care Discussed with Consultants/Other Providers:    Emmie Montgomery MD, Internal Medicine Resident

## 2023-07-25 NOTE — PROGRESS NOTE ADULT - PROBLEM SELECTOR PLAN 2
Hb 8.1, improved from 6.3 on admission.   - EPO 10,000 U subQ with HD today 7/25  - would obtain iron studies  - trend Hb

## 2023-07-26 ENCOUNTER — TRANSCRIPTION ENCOUNTER (OUTPATIENT)
Age: 63
End: 2023-07-26

## 2023-07-26 LAB
ANION GAP SERPL CALC-SCNC: 14 MMOL/L — SIGNIFICANT CHANGE UP (ref 5–17)
BLD GP AB SCN SERPL QL: NEGATIVE — SIGNIFICANT CHANGE UP
BUN SERPL-MCNC: 24 MG/DL — HIGH (ref 7–23)
CALCIUM SERPL-MCNC: 8.6 MG/DL — SIGNIFICANT CHANGE UP (ref 8.4–10.5)
CHLORIDE SERPL-SCNC: 99 MMOL/L — SIGNIFICANT CHANGE UP (ref 96–108)
CO2 SERPL-SCNC: 25 MMOL/L — SIGNIFICANT CHANGE UP (ref 22–31)
CREAT SERPL-MCNC: 6.81 MG/DL — HIGH (ref 0.5–1.3)
EGFR: 6 ML/MIN/1.73M2 — LOW
GLUCOSE BLDC GLUCOMTR-MCNC: 113 MG/DL — HIGH (ref 70–99)
GLUCOSE BLDC GLUCOMTR-MCNC: 315 MG/DL — HIGH (ref 70–99)
GLUCOSE SERPL-MCNC: 83 MG/DL — SIGNIFICANT CHANGE UP (ref 70–99)
HCT VFR BLD CALC: 23.9 % — LOW (ref 34.5–45)
HCT VFR BLD CALC: 29.5 % — LOW (ref 34.5–45)
HGB BLD-MCNC: 7.7 G/DL — LOW (ref 11.5–15.5)
HGB BLD-MCNC: 9.7 G/DL — LOW (ref 11.5–15.5)
IRON SATN MFR SERPL: 23 % — SIGNIFICANT CHANGE UP (ref 14–50)
IRON SATN MFR SERPL: 38 UG/DL — SIGNIFICANT CHANGE UP (ref 30–160)
MAGNESIUM SERPL-MCNC: 2.2 MG/DL — SIGNIFICANT CHANGE UP (ref 1.6–2.6)
MCHC RBC-ENTMCNC: 29.5 PG — SIGNIFICANT CHANGE UP (ref 27–34)
MCHC RBC-ENTMCNC: 29.8 PG — SIGNIFICANT CHANGE UP (ref 27–34)
MCHC RBC-ENTMCNC: 32.2 GM/DL — SIGNIFICANT CHANGE UP (ref 32–36)
MCHC RBC-ENTMCNC: 32.9 GM/DL — SIGNIFICANT CHANGE UP (ref 32–36)
MCV RBC AUTO: 89.7 FL — SIGNIFICANT CHANGE UP (ref 80–100)
MCV RBC AUTO: 92.6 FL — SIGNIFICANT CHANGE UP (ref 80–100)
NRBC # BLD: 3 /100 WBCS — HIGH (ref 0–0)
NRBC # BLD: 3 /100 WBCS — HIGH (ref 0–0)
PHOSPHATE SERPL-MCNC: 3.1 MG/DL — SIGNIFICANT CHANGE UP (ref 2.5–4.5)
PLATELET # BLD AUTO: 73 K/UL — LOW (ref 150–400)
PLATELET # BLD AUTO: 90 K/UL — LOW (ref 150–400)
POTASSIUM SERPL-MCNC: 4.1 MMOL/L — SIGNIFICANT CHANGE UP (ref 3.5–5.3)
POTASSIUM SERPL-SCNC: 4.1 MMOL/L — SIGNIFICANT CHANGE UP (ref 3.5–5.3)
RBC # BLD: 2.58 M/UL — LOW (ref 3.8–5.2)
RBC # BLD: 3.29 M/UL — LOW (ref 3.8–5.2)
RBC # FLD: 19 % — HIGH (ref 10.3–14.5)
RBC # FLD: 19 % — HIGH (ref 10.3–14.5)
RH IG SCN BLD-IMP: POSITIVE — SIGNIFICANT CHANGE UP
SODIUM SERPL-SCNC: 138 MMOL/L — SIGNIFICANT CHANGE UP (ref 135–145)
TIBC SERPL-MCNC: 162 UG/DL — LOW (ref 220–430)
UIBC SERPL-MCNC: 124 UG/DL — SIGNIFICANT CHANGE UP (ref 110–370)
WBC # BLD: 3.89 K/UL — SIGNIFICANT CHANGE UP (ref 3.8–10.5)
WBC # BLD: 4.21 K/UL — SIGNIFICANT CHANGE UP (ref 3.8–10.5)
WBC # FLD AUTO: 3.89 K/UL — SIGNIFICANT CHANGE UP (ref 3.8–10.5)
WBC # FLD AUTO: 4.21 K/UL — SIGNIFICANT CHANGE UP (ref 3.8–10.5)

## 2023-07-26 PROCEDURE — 99233 SBSQ HOSP IP/OBS HIGH 50: CPT | Mod: GC

## 2023-07-26 PROCEDURE — 99232 SBSQ HOSP IP/OBS MODERATE 35: CPT | Mod: GC

## 2023-07-26 PROCEDURE — 99232 SBSQ HOSP IP/OBS MODERATE 35: CPT

## 2023-07-26 RX ORDER — SENNA PLUS 8.6 MG/1
2 TABLET ORAL AT BEDTIME
Refills: 0 | Status: DISCONTINUED | OUTPATIENT
Start: 2023-07-26 | End: 2023-07-27

## 2023-07-26 RX ADMIN — DANAZOL 200 MILLIGRAM(S): 200 CAPSULE ORAL at 21:41

## 2023-07-26 RX ADMIN — SENNA PLUS 2 TABLET(S): 8.6 TABLET ORAL at 22:04

## 2023-07-26 RX ADMIN — PANTOPRAZOLE SODIUM 40 MILLIGRAM(S): 20 TABLET, DELAYED RELEASE ORAL at 05:35

## 2023-07-26 RX ADMIN — ERYTHROPOIETIN 10000 UNIT(S): 10000 INJECTION, SOLUTION INTRAVENOUS; SUBCUTANEOUS at 14:17

## 2023-07-26 RX ADMIN — CARVEDILOL PHOSPHATE 3.12 MILLIGRAM(S): 80 CAPSULE, EXTENDED RELEASE ORAL at 08:30

## 2023-07-26 RX ADMIN — Medication 3 MILLIGRAM(S): at 22:04

## 2023-07-26 RX ADMIN — GABAPENTIN 100 MILLIGRAM(S): 400 CAPSULE ORAL at 21:41

## 2023-07-26 RX ADMIN — CHLORHEXIDINE GLUCONATE 1 APPLICATION(S): 213 SOLUTION TOPICAL at 05:29

## 2023-07-26 RX ADMIN — DANAZOL 200 MILLIGRAM(S): 200 CAPSULE ORAL at 13:34

## 2023-07-26 RX ADMIN — DANAZOL 200 MILLIGRAM(S): 200 CAPSULE ORAL at 05:35

## 2023-07-26 RX ADMIN — LIDOCAINE 1 PATCH: 4 CREAM TOPICAL at 00:00

## 2023-07-26 RX ADMIN — CARVEDILOL PHOSPHATE 3.12 MILLIGRAM(S): 80 CAPSULE, EXTENDED RELEASE ORAL at 18:27

## 2023-07-26 RX ADMIN — Medication 1 TABLET(S): at 13:34

## 2023-07-26 NOTE — PROGRESS NOTE ADULT - SUBJECTIVE AND OBJECTIVE BOX
Patient is a 63y old  Female who presents with a chief complaint of clotted AVF and missed HD (25 Jul 2023 14:02)      SUBJECTIVE / OVERNIGHT EVENTS: Patient seen and examined at bedside.    MEDICATIONS  (STANDING):  carvedilol 3.125 milliGRAM(s) Oral every 12 hours  chlorhexidine 2% Cloths 1 Application(s) Topical <User Schedule>  danazol 200 milliGRAM(s) Oral three times a day  epoetin filiberto (PROCRIT) Injectable 45599 Unit(s) SubCutaneous <User Schedule>  gabapentin 100 milliGRAM(s) Oral daily  lactated ringers. 1000 milliLiter(s) (75 mL/Hr) IV Continuous <Continuous>  lidocaine   4% Patch 1 Patch Transdermal daily  Nephro-deonna 1 Tablet(s) Oral daily  pantoprazole    Tablet 40 milliGRAM(s) Oral before breakfast  sodium chloride 0.9%. 1000 milliLiter(s) (20 mL/Hr) IV Continuous <Continuous>    MEDICATIONS  (PRN):  acetaminophen     Tablet .. 650 milliGRAM(s) Oral every 6 hours PRN Temp greater or equal to 38C (100.4F), Mild Pain (1 - 3)  fentaNYL    Injectable 12.5 MICROGram(s) IV Push every 5 minutes PRN Moderate Pain (4 - 6)  HYDROmorphone  Injectable 0.25 milliGRAM(s) IV Push every 10 minutes PRN Severe Pain (7 - 10)  melatonin 3 milliGRAM(s) Oral at bedtime PRN Insomnia  ondansetron Injectable 4 milliGRAM(s) IV Push once PRN Nausea and/or Vomiting  ondansetron Injectable 4 milliGRAM(s) IV Push every 8 hours PRN Nausea and/or Vomiting      Vital Signs Last 24 Hrs  T(C): 37.1 (26 Jul 2023 04:36), Max: 37.1 (26 Jul 2023 04:36)  T(F): 98.8 (26 Jul 2023 04:36), Max: 98.8 (26 Jul 2023 04:36)  HR: 82 (26 Jul 2023 04:36) (70 - 87)  BP: 100/68 (26 Jul 2023 04:36) (100/68 - 148/94)  BP(mean): --  RR: 18 (26 Jul 2023 04:36) (18 - 18)  SpO2: 95% (26 Jul 2023 04:36) (95% - 100%)    Parameters below as of 26 Jul 2023 04:36  Patient On (Oxygen Delivery Method): room air      CAPILLARY BLOOD GLUCOSE      POCT Blood Glucose.: 74 mg/dL (25 Jul 2023 17:45)    I&O's Summary    24 Jul 2023 07:01  -  25 Jul 2023 07:00  --------------------------------------------------------  IN: 270 mL / OUT: 0 mL / NET: 270 mL    25 Jul 2023 07:01  -  26 Jul 2023 06:33  --------------------------------------------------------  IN: 0 mL / OUT: 2000 mL / NET: -2000 mL        PHYSICAL EXAM:  GENERAL: NAD, well-developed  HEAD:  Atraumatic, Normocephalic  EYES: EOMI, PERRLA, conjunctiva and sclera clear  NECK: Supple, No JVD  CHEST/LUNG: Clear to auscultation bilaterally; No wheeze  HEART: Regular rate and rhythm; No murmurs, rubs, or gallops  ABDOMEN: Soft, Nontender, Nondistended; Bowel sounds present  EXTREMITIES:  2+ Peripheral Pulses, No clubbing, cyanosis, or edema  PSYCH: AAOx3  NEUROLOGY: non-focal  SKIN: No rashes or lesions    LABS:                        8.1    3.98  )-----------( 62       ( 25 Jul 2023 06:01 )             24.8     07-25    137  |  103  |  51<H>  ----------------------------<  97  4.7   |  17<L>  |  11.66<H>    Ca    8.3<L>      25 Jul 2023 06:01  Phos  4.0     07-25  Mg     2.3     07-25    TPro  7.0  /  Alb  3.9  /  TBili  1.3<H>  /  DBili  x   /  AST  13  /  ALT  10  /  AlkPhos  73  07-25          Urinalysis Basic - ( 25 Jul 2023 06:01 )    Color: x / Appearance: x / SG: x / pH: x  Gluc: 97 mg/dL / Ketone: x  / Bili: x / Urobili: x   Blood: x / Protein: x / Nitrite: x   Leuk Esterase: x / RBC: x / WBC x   Sq Epi: x / Non Sq Epi: x / Bacteria: x        RADIOLOGY & ADDITIONAL TESTS:    Imaging Personally Reviewed:    Consultant(s) Notes Reviewed:      Care Discussed with Consultants/Other Providers:    Emmie Montgomery MD, Internal Medicine Resident     Patient is a 63y old  Female who presents with a chief complaint of clotted AVF and missed HD (25 Jul 2023 14:02)      SUBJECTIVE / OVERNIGHT EVENTS: Patient seen and examined at bedside. No acute events overnight. Pt underwent thrombectomy of clotted L AVF yesterday (7/25) with IR which was successful and AVF was salvageable. Pt received HD yesterday also after the procedure.    MEDICATIONS  (STANDING):  carvedilol 3.125 milliGRAM(s) Oral every 12 hours  chlorhexidine 2% Cloths 1 Application(s) Topical <User Schedule>  danazol 200 milliGRAM(s) Oral three times a day  epoetin filiberto (PROCRIT) Injectable 73982 Unit(s) SubCutaneous <User Schedule>  gabapentin 100 milliGRAM(s) Oral daily  lactated ringers. 1000 milliLiter(s) (75 mL/Hr) IV Continuous <Continuous>  lidocaine   4% Patch 1 Patch Transdermal daily  Nephro-deonna 1 Tablet(s) Oral daily  pantoprazole    Tablet 40 milliGRAM(s) Oral before breakfast  sodium chloride 0.9%. 1000 milliLiter(s) (20 mL/Hr) IV Continuous <Continuous>    MEDICATIONS  (PRN):  acetaminophen     Tablet .. 650 milliGRAM(s) Oral every 6 hours PRN Temp greater or equal to 38C (100.4F), Mild Pain (1 - 3)  fentaNYL    Injectable 12.5 MICROGram(s) IV Push every 5 minutes PRN Moderate Pain (4 - 6)  HYDROmorphone  Injectable 0.25 milliGRAM(s) IV Push every 10 minutes PRN Severe Pain (7 - 10)  melatonin 3 milliGRAM(s) Oral at bedtime PRN Insomnia  ondansetron Injectable 4 milliGRAM(s) IV Push once PRN Nausea and/or Vomiting  ondansetron Injectable 4 milliGRAM(s) IV Push every 8 hours PRN Nausea and/or Vomiting      Vital Signs Last 24 Hrs  T(C): 37.1 (26 Jul 2023 04:36), Max: 37.1 (26 Jul 2023 04:36)  T(F): 98.8 (26 Jul 2023 04:36), Max: 98.8 (26 Jul 2023 04:36)  HR: 82 (26 Jul 2023 04:36) (70 - 87)  BP: 100/68 (26 Jul 2023 04:36) (100/68 - 148/94)  BP(mean): --  RR: 18 (26 Jul 2023 04:36) (18 - 18)  SpO2: 95% (26 Jul 2023 04:36) (95% - 100%)    Parameters below as of 26 Jul 2023 04:36  Patient On (Oxygen Delivery Method): room air      CAPILLARY BLOOD GLUCOSE      POCT Blood Glucose.: 74 mg/dL (25 Jul 2023 17:45)    I&O's Summary    24 Jul 2023 07:01  -  25 Jul 2023 07:00  --------------------------------------------------------  IN: 270 mL / OUT: 0 mL / NET: 270 mL    25 Jul 2023 07:01  -  26 Jul 2023 06:33  --------------------------------------------------------  IN: 0 mL / OUT: 2000 mL / NET: -2000 mL        PHYSICAL EXAM:  GENERAL: NAD, well-developed  HEAD:  Atraumatic, Normocephalic  EYES: EOMI, PERRLA, conjunctiva and sclera clear  NECK: Supple, No JVD  CHEST/LUNG: Clear to auscultation bilaterally; No wheeze  HEART: Regular rate and rhythm; No murmurs, rubs, or gallops  ABDOMEN: Soft, Nontender, Nondistended; Bowel sounds present  EXTREMITIES:  2+ Peripheral Pulses, No clubbing, cyanosis, or edema  PSYCH: AAOx3  NEUROLOGY: non-focal  SKIN: No rashes or lesions    LABS:                        8.1    3.98  )-----------( 62       ( 25 Jul 2023 06:01 )             24.8     07-25    137  |  103  |  51<H>  ----------------------------<  97  4.7   |  17<L>  |  11.66<H>    Ca    8.3<L>      25 Jul 2023 06:01  Phos  4.0     07-25  Mg     2.3     07-25    TPro  7.0  /  Alb  3.9  /  TBili  1.3<H>  /  DBili  x   /  AST  13  /  ALT  10  /  AlkPhos  73  07-25          Urinalysis Basic - ( 25 Jul 2023 06:01 )    Color: x / Appearance: x / SG: x / pH: x  Gluc: 97 mg/dL / Ketone: x  / Bili: x / Urobili: x   Blood: x / Protein: x / Nitrite: x   Leuk Esterase: x / RBC: x / WBC x   Sq Epi: x / Non Sq Epi: x / Bacteria: x        RADIOLOGY & ADDITIONAL TESTS:    Imaging Personally Reviewed:    Consultant(s) Notes Reviewed:      Care Discussed with Consultants/Other Providers:    Emmie Montgomery MD, Internal Medicine Resident     Patient is a 63y old  Female who presents with a chief complaint of clotted AVF and missed HD (25 Jul 2023 14:02)      SUBJECTIVE / OVERNIGHT EVENTS: Patient seen and examined at bedside. No acute events overnight. Pt underwent thrombectomy of clotted L AVF yesterday (7/25) with IR which was successful and AVF was salvageable. She tolerated the procedure well, still reporting some pain on LUE and some fatigue. Denies fever, chills, chest pain, abdominal pain, sob, n/v/d. Pt received HD yesterday after the procedure and will receive HD today.    MEDICATIONS  (STANDING):  carvedilol 3.125 milliGRAM(s) Oral every 12 hours  chlorhexidine 2% Cloths 1 Application(s) Topical <User Schedule>  danazol 200 milliGRAM(s) Oral three times a day  epoetin filiberto (PROCRIT) Injectable 72523 Unit(s) SubCutaneous <User Schedule>  gabapentin 100 milliGRAM(s) Oral daily  lactated ringers. 1000 milliLiter(s) (75 mL/Hr) IV Continuous <Continuous>  lidocaine   4% Patch 1 Patch Transdermal daily  Nephro-deonna 1 Tablet(s) Oral daily  pantoprazole    Tablet 40 milliGRAM(s) Oral before breakfast  sodium chloride 0.9%. 1000 milliLiter(s) (20 mL/Hr) IV Continuous <Continuous>    MEDICATIONS  (PRN):  acetaminophen     Tablet .. 650 milliGRAM(s) Oral every 6 hours PRN Temp greater or equal to 38C (100.4F), Mild Pain (1 - 3)  fentaNYL    Injectable 12.5 MICROGram(s) IV Push every 5 minutes PRN Moderate Pain (4 - 6)  HYDROmorphone  Injectable 0.25 milliGRAM(s) IV Push every 10 minutes PRN Severe Pain (7 - 10)  melatonin 3 milliGRAM(s) Oral at bedtime PRN Insomnia  ondansetron Injectable 4 milliGRAM(s) IV Push once PRN Nausea and/or Vomiting  ondansetron Injectable 4 milliGRAM(s) IV Push every 8 hours PRN Nausea and/or Vomiting      Vital Signs Last 24 Hrs  T(C): 37.1 (26 Jul 2023 04:36), Max: 37.1 (26 Jul 2023 04:36)  T(F): 98.8 (26 Jul 2023 04:36), Max: 98.8 (26 Jul 2023 04:36)  HR: 82 (26 Jul 2023 04:36) (70 - 87)  BP: 100/68 (26 Jul 2023 04:36) (100/68 - 148/94)  BP(mean): --  RR: 18 (26 Jul 2023 04:36) (18 - 18)  SpO2: 95% (26 Jul 2023 04:36) (95% - 100%)    Parameters below as of 26 Jul 2023 04:36  Patient On (Oxygen Delivery Method): room air      CAPILLARY BLOOD GLUCOSE      POCT Blood Glucose.: 74 mg/dL (25 Jul 2023 17:45)    I&O's Summary    24 Jul 2023 07:01  -  25 Jul 2023 07:00  --------------------------------------------------------  IN: 270 mL / OUT: 0 mL / NET: 270 mL    25 Jul 2023 07:01  -  26 Jul 2023 06:33  --------------------------------------------------------  IN: 0 mL / OUT: 2000 mL / NET: -2000 mL        PHYSICAL EXAM:  GENERAL: NAD, well-developed  HEAD:  Atraumatic, Normocephalic  EYES: EOMI, PERRLA, conjunctiva and sclera clear  NECK: Supple, No JVD  CHEST/LUNG: Clear to auscultation bilaterally; No wheeze  HEART: Regular rate and rhythm; No murmurs, rubs, or gallops  ABDOMEN: Soft, Nontender, Nondistended; Bowel sounds present  EXTREMITIES:  2+ Peripheral Pulses, No clubbing, cyanosis, or edema, +ttp on LUE, palpable thrill  PSYCH: AAOx3  NEUROLOGY: non-focal  SKIN: No rashes or lesions    LABS:                        8.1    3.98  )-----------( 63       ( 25 Jul 2023 06:01 )             24.8     07-25    137  |  103  |  51<H>  ----------------------------<  97  4.7   |  17<L>  |  11.66<H>    Ca    8.3<L>      25 Jul 2023 06:01  Phos  4.0     07-25  Mg     2.3     07-25    TPro  7.0  /  Alb  3.9  /  TBili  1.3<H>  /  DBili  x   /  AST  13  /  ALT  10  /  AlkPhos  73  07-25          Urinalysis Basic - ( 25 Jul 2023 06:01 )    Color: x / Appearance: x / SG: x / pH: x  Gluc: 97 mg/dL / Ketone: x  / Bili: x / Urobili: x   Blood: x / Protein: x / Nitrite: x   Leuk Esterase: x / RBC: x / WBC x   Sq Epi: x / Non Sq Epi: x / Bacteria: x        RADIOLOGY & ADDITIONAL TESTS:    Imaging Personally Reviewed:    Consultant(s) Notes Reviewed:      Care Discussed with Consultants/Other Providers:    Emmie Montgomery MD, Internal Medicine Resident

## 2023-07-26 NOTE — PROGRESS NOTE ADULT - PROBLEM SELECTOR PLAN 4
-consulted heme onc regarding Jakafi   - trend platelets  -Plt downtrending to 58, likely secondary to uremia,  -Per IR would like >50 for procedure, platelets 62 on 7/25 -consulted heme onc regarding Jakafi   - trend platelets  -Plt downtrending initially to 58, now 73 on 7/26: likely secondary to uremia

## 2023-07-26 NOTE — PROGRESS NOTE ADULT - PROBLEM SELECTOR PLAN 1
HD MWF, last HD session Wednesday 7/19, has not been getting HD due to AV fistula occlusion. Initially hyperkalemic, improved to normal range with lokelma. Na 137, K 4.7, SCr 11.66 (9.53 on admission, gradually uptrending), BUN 51. Phos 4.0. Euvolemic on exam, vitals stable.  Had thrombectomy of LUE AVF by IR on 7/25, had HD afterwards with no issue with .   Had another HD session on 7/26 to get back onto regular schedule, will receive 1u PRBC with HD because she stated she gets transfusions outpatient for Hgb < 8. Also on epo.     Clear for dc from renal standpoint. Communicated with primary team. HD MWF, last HD session Wednesday 7/19, has not been getting HD due to AV fistula occlusion. Initially hyperkalemic, improved to normal range with lokelma. Na 137, K 4.7, SCr 11.66 (9.53 on admission, gradually uptrending), BUN 51. Phos 4.0. Euvolemic on exam, vitals stable.  Had thrombectomy of LUE AVF by IR on 7/25, had HD afterwards with no issue with .   Schedule for  HD session today to get back onto regular schedule, will receive 1u PRBC with HD because she stated she gets transfusions outpatient for Hgb < 8. Also on epo.     Clear for dc from renal standpoint. Communicated with primary team.

## 2023-07-26 NOTE — PROGRESS NOTE ADULT - PROBLEM SELECTOR PLAN 1
-Vascular Surgery consult appreciated,   - pending IR for thrombectomy, planned for 7/25 (pt on schedule) s/p IR thrombectomy on 7/25, avf salvaged, pt received one HD session as well  plan to give pt one more session of HD today 7/26, then pt can resume on [facility] schedule outpatient 65F PMH migraines p/w scalp pain, likely superficial vs cervical neuralgia. No neuro deficits. Will give tylenol/motrin and reassess

## 2023-07-26 NOTE — DISCHARGE NOTE NURSING/CASE MANAGEMENT/SOCIAL WORK - [id number]_GEN_ALL_CORE
For information on Fall & Injury Prevention, visit: [email or web address] OR  [email or web address] OR  https://www.cdc.gov/steadi/patient.html

## 2023-07-26 NOTE — PROGRESS NOTE ADULT - SUBJECTIVE AND OBJECTIVE BOX
Interventional Radiology Follow-Up Note.    Patient seen and examined @ bedside.    This is a 63y Female s/p LUE AVF thrombolysis/thrombectomy and angioplasty on 7/25/23 in Interventional Radiology with Dr. Frederick.     No complaint offered.      Medication:  alteplase for catheter clearance: (07-25)  alteplase for catheter clearance: (07-25)  carvedilol: (07-25)  furosemide   Injectable: (07-25)  furosemide   Injectable: (07-24)    Vitals:  T(F): 98.8, Max: 98.8 (04:36)  HR: 82  BP: 100/68  RR: 18  SpO2: 95%    Physical Exam:  General: Nontoxic, in NAD.  Abdomen: soft, NTND.   Extremities:  LUE clean, dry and intact, soft with no evidence of hematoma, +thrill, no edema          LABS:  Na: 137 (07-25 @ 06:01), 134 (07-25 @ 02:27), 136 (07-24 @ 15:07), 135 (07-24 @ 05:01), 135 (07-23 @ 20:39), 136 (07-23 @ 12:20)  K: 4.7 (07-25 @ 06:01), 4.4 (07-25 @ 02:27), 4.5 (07-24 @ 15:07), 5.0 (07-24 @ 05:01), 4.9 (07-23 @ 20:39), 5.3 (07-23 @ 12:20)  Cl: 103 (07-25 @ 06:01), 101 (07-25 @ 02:27), 102 (07-24 @ 15:07), 103 (07-24 @ 05:01), 101 (07-23 @ 20:39), 103 (07-23 @ 12:20)  CO2: 17 (07-25 @ 06:01), 19 (07-25 @ 02:27), 19 (07-24 @ 15:07), 17 (07-24 @ 05:01), 18 (07-23 @ 20:39), 21 (07-23 @ 12:20)  BUN: 51 (07-25 @ 06:01), 50 (07-25 @ 02:27), 48 (07-24 @ 15:07), 44 (07-24 @ 05:01), 45 (07-23 @ 20:39), 44 (07-23 @ 12:20)  Cr: 11.66 (07-25 @ 06:01), 11.42 (07-25 @ 02:27), 11.03 (07-24 @ 15:07), 10.43 (07-24 @ 05:01), 9.84 (07-23 @ 20:39), 9.52 (07-23 @ 12:20)  Glu: 97(07-25 @ 06:01), 204(07-25 @ 02:27), 121(07-24 @ 15:07), 90(07-24 @ 05:01), 192(07-23 @ 20:39), 140(07-23 @ 12:20)    Hgb: 8.1 (07-25 @ 06:01), 7.6 (07-24 @ 05:01), 6.8 (07-23 @ 20:39), 7.1 (07-23 @ 10:39)  Hct: 24.8 (07-25 @ 06:01), 23.5 (07-24 @ 05:01), 21.1 (07-23 @ 20:39), 22.7 (07-23 @ 10:39)  WBC: 3.98 (07-25 @ 06:01), 4.43 (07-24 @ 05:01), 4.68 (07-23 @ 20:39), 4.96 (07-23 @ 10:39)  Plt: 62 (07-25 @ 06:01), 58 (07-24 @ 05:01), 65 (07-23 @ 20:39), 72 (07-23 @ 10:39)        LIVER FUNCTIONS - ( 25 Jul 2023 06:01 )  Alb: 3.9 g/dL / Pro: 7.0 g/dL / ALK PHOS: 73 U/L / ALT: 10 U/L / AST: 13 U/L / GGT: x                   Aspartate Aminotransferase (AST/SGOT): 13 U/L (07-25-23 @ 06:01)  Alanine Aminotransferase (ALT/SGPT): 10 U/L (07-25-23 @ 06:01)      Bilirubin Total: 1.3 mg/dL *H* (07-25-23 @ 06:01)          Assessment/Plan:  63F w/  myelofibrosis, polycythemia vera, ESRD on MWF schedule via LUE AVF, portal HTN c/b IGV1, subclavian port for transfusions, gastric varices, presents for clotted fistula and inability to undergo dialysis.  Pt most recently s/p LUE AVF thrombolysis/thrombectomy and angioplasty on 7/25 in IR with Dr. Frederick.     technically successful US and fluoroscopic guided LUE AVF thrombolysis/thrombectomy with angioplasty. post declot angio demonstrates brisk flow through the fistula. a strong palpable thrill was appreciated at case termination. pursestring sutures were placed at the access sites.    - OK to use AVF for dialysis  - will plan for removal of purse string suture today- will d/w Dr. Frederick  - Trend vs/labs  - Continue global management per primary team     Please call IR at [phone] with any questions, concerns, or issues regarding above.    Also available on [employer] TEAMS.   Interventional Radiology Follow-Up Note.    Patient seen and examined @ bedside.    This is a 63y Female s/p LUE AVF thrombolysis/thrombectomy and angioplasty on 7/25/23 in Interventional Radiology with Dr. Frederick.     No complaint offered.      Medication:  alteplase for catheter clearance: (07-25)  alteplase for catheter clearance: (07-25)  carvedilol: (07-25)  furosemide   Injectable: (07-25)  furosemide   Injectable: (07-24)    Vitals:  T(F): 98.8, Max: 98.8 (04:36)  HR: 82  BP: 100/68  RR: 18  SpO2: 95%    Physical Exam:  General: Nontoxic, in NAD.  Abdomen: soft, NTND.   Extremities:  LUE clean, dry and intact, soft with no evidence of hematoma, +thrill, no edema. Purse string sutures removed.          LABS:  Na: 137 (07-25 @ 06:01), 134 (07-25 @ 02:27), 136 (07-24 @ 15:07), 135 (07-24 @ 05:01), 135 (07-23 @ 20:39), 136 (07-23 @ 12:20)  K: 4.7 (07-25 @ 06:01), 4.4 (07-25 @ 02:27), 4.5 (07-24 @ 15:07), 5.0 (07-24 @ 05:01), 4.9 (07-23 @ 20:39), 5.3 (07-23 @ 12:20)  Cl: 103 (07-25 @ 06:01), 101 (07-25 @ 02:27), 102 (07-24 @ 15:07), 103 (07-24 @ 05:01), 101 (07-23 @ 20:39), 103 (07-23 @ 12:20)  CO2: 17 (07-25 @ 06:01), 19 (07-25 @ 02:27), 19 (07-24 @ 15:07), 17 (07-24 @ 05:01), 18 (07-23 @ 20:39), 21 (07-23 @ 12:20)  BUN: 51 (07-25 @ 06:01), 50 (07-25 @ 02:27), 48 (07-24 @ 15:07), 44 (07-24 @ 05:01), 45 (07-23 @ 20:39), 44 (07-23 @ 12:20)  Cr: 11.66 (07-25 @ 06:01), 11.42 (07-25 @ 02:27), 11.03 (07-24 @ 15:07), 10.43 (07-24 @ 05:01), 9.84 (07-23 @ 20:39), 9.52 (07-23 @ 12:20)  Glu: 97(07-25 @ 06:01), 204(07-25 @ 02:27), 121(07-24 @ 15:07), 90(07-24 @ 05:01), 192(07-23 @ 20:39), 140(07-23 @ 12:20)    Hgb: 8.1 (07-25 @ 06:01), 7.6 (07-24 @ 05:01), 6.8 (07-23 @ 20:39), 7.1 (07-23 @ 10:39)  Hct: 24.8 (07-25 @ 06:01), 23.5 (07-24 @ 05:01), 21.1 (07-23 @ 20:39), 22.7 (07-23 @ 10:39)  WBC: 3.98 (07-25 @ 06:01), 4.43 (07-24 @ 05:01), 4.68 (07-23 @ 20:39), 4.96 (07-23 @ 10:39)  Plt: 62 (07-25 @ 06:01), 58 (07-24 @ 05:01), 65 (07-23 @ 20:39), 72 (07-23 @ 10:39)        LIVER FUNCTIONS - ( 25 Jul 2023 06:01 )  Alb: 3.9 g/dL / Pro: 7.0 g/dL / ALK PHOS: 73 U/L / ALT: 10 U/L / AST: 13 U/L / GGT: x                   Aspartate Aminotransferase (AST/SGOT): 13 U/L (07-25-23 @ 06:01)  Alanine Aminotransferase (ALT/SGPT): 10 U/L (07-25-23 @ 06:01)      Bilirubin Total: 1.3 mg/dL *H* (07-25-23 @ 06:01)          Assessment/Plan:  63F w/  myelofibrosis, polycythemia vera, ESRD on MWF schedule via LUE AVF, portal HTN c/b IGV1, subclavian port for transfusions, gastric varices, presents for clotted fistula and inability to undergo dialysis.  Pt most recently s/p LUE AVF thrombolysis/thrombectomy and angioplasty on 7/25 in IR with Dr. Frederick.     technically successful US and fluoroscopic guided LUE AVF thrombolysis/thrombectomy with angioplasty. post declot angio demonstrates brisk flow through the fistula. a strong palpable thrill was appreciated at case termination. pursestring sutures were placed at the access sites.    - OK to use AVF for dialysis  - purse string sutures removed.  - Trend vs/labs  - Continue global management per primary team     Please call IR at [phone] with any questions, concerns, or issues regarding above.    Also available on [employer] TEAMS.

## 2023-07-26 NOTE — DISCHARGE NOTE NURSING/CASE MANAGEMENT/SOCIAL WORK - NSDCCRTYPESERV_GEN_ALL_CORE_FT
Your services with [facility] have been reinstated for chair times M/W/F. Your next appointment is for 7/28/23.

## 2023-07-26 NOTE — PROGRESS NOTE ADULT - PROBLEM SELECTOR PLAN 5
no reported h/o DM     - insulin correction scale  - check fsg qac/qhs  - check a1c in am no reported h/o DM

## 2023-07-26 NOTE — PROGRESS NOTE ADULT - SUBJECTIVE AND OBJECTIVE BOX
TEAM Vascular Surgery Daily Progress Note  =====================================================    SUBJECTIVE: Patient seen and examined at bedside on AM rounds. Patient reports that they're feeling well. Patient s/p thrombectomy with IR. Able to tolerate HD last night.     ALLERGIES:  No Known Allergies      --------------------------------------------------------------------------------------    MEDICATIONS:    Neurologic Medications  acetaminophen     Tablet .. 650 milliGRAM(s) Oral every 6 hours PRN Temp greater or equal to 38C (100.4F), Mild Pain (1 - 3)  fentaNYL    Injectable 12.5 MICROGram(s) IV Push every 5 minutes PRN Moderate Pain (4 - 6)  gabapentin 100 milliGRAM(s) Oral daily  HYDROmorphone  Injectable 0.25 milliGRAM(s) IV Push every 10 minutes PRN Severe Pain (7 - 10)  melatonin 3 milliGRAM(s) Oral at bedtime PRN Insomnia  ondansetron Injectable 4 milliGRAM(s) IV Push once PRN Nausea and/or Vomiting  ondansetron Injectable 4 milliGRAM(s) IV Push every 8 hours PRN Nausea and/or Vomiting    Respiratory Medications    Cardiovascular Medications  carvedilol 3.125 milliGRAM(s) Oral every 12 hours    Gastrointestinal Medications  lactated ringers. 1000 milliLiter(s) IV Continuous <Continuous>  Nephro-deonna 1 Tablet(s) Oral daily  pantoprazole    Tablet 40 milliGRAM(s) Oral before breakfast  sodium chloride 0.9%. 1000 milliLiter(s) IV Continuous <Continuous>    Genitourinary Medications    Hematologic/Oncologic Medications  epoetin filiberto (PROCRIT) Injectable 79289 Unit(s) SubCutaneous <User Schedule>    Antimicrobial/Immunologic Medications    Endocrine/Metabolic Medications  danazol 200 milliGRAM(s) Oral three times a day    Topical/Other Medications  chlorhexidine 2% Cloths 1 Application(s) Topical <User Schedule>  lidocaine   4% Patch 1 Patch Transdermal daily    --------------------------------------------------------------------------------------    VITAL SIGNS:  Vital Signs Last 24 Hrs  T(C): 36.7 (26 Jul 2023 11:04), Max: 37.1 (26 Jul 2023 04:36)  T(F): 98.1 (26 Jul 2023 11:04), Max: 98.8 (26 Jul 2023 04:36)  HR: 77 (26 Jul 2023 11:04) (70 - 87)  BP: 113/60 (26 Jul 2023 11:04) (100/68 - 148/94)  BP(mean): --  ABP: --  ABP(mean): --  RR: 18 (26 Jul 2023 11:04) (18 - 18)  SpO2: 96% (26 Jul 2023 11:04) (95% - 100%)    O2 Parameters below as of 26 Jul 2023 11:04  Patient On (Oxygen Delivery Method): room air          --------------------------------------------------------------------------------------    EXAM    General: NAD, resting in bed comfortably.  Cardiac: regular rate, warm and well perfused  Respiratory: Nonlabored respirations, normal cw expansion.  Extremities:   LUE AVF site with cdi dressing and palpable thrill. Radial pulse palpable.     --------------------------------------------------------------------------------------    LABS                          7.7    3.89  )-----------( 73       ( 26 Jul 2023 06:34 )             23.9   07-26    138  |  99  |  24<H>  ----------------------------<  83  4.1   |  25  |  6.81<H>    Ca    8.6      26 Jul 2023 06:34  Phos  3.1     07-26  Mg     2.2     07-26    TPro  7.0  /  Alb  3.9  /  TBili  1.3<H>  /  DBili  x   /  AST  13  /  ALT  10  /  AlkPhos  73  07-25      --------------------------------------------------------------------------------------    INS AND OUTS:    I&O's Detail    25 Jul 2023 07:01  -  26 Jul 2023 07:00  --------------------------------------------------------  IN:  Total IN: 0 mL    OUT:    Oral Fluid: 0 mL    Other (mL): 2000 mL  Total OUT: 2000 mL    Total NET: -2000 mL      --------------------------------------------------------------------------------------

## 2023-07-26 NOTE — DISCHARGE NOTE NURSING/CASE MANAGEMENT/SOCIAL WORK - PATIENT PORTAL LINK FT
You can access the FollowMyHealth Patient Portal offered by [facility] by registering at the following website: [email or web address]. By joining [employer]’s FollowMyHealth portal, you will also be able to view your health information using other applications (apps) compatible with our system.

## 2023-07-26 NOTE — PROGRESS NOTE ADULT - ATTENDING COMMENTS
64 y/o ESRD patient here for a clotted AVF    s/p thrombectomy 7/25    #ESRD: On HD. HD MWF. Underwent HD yesterday post thrombectomy with no issues. 2 Liter of UF  schedule for another HD session today so that she is back on her MWF schedule    #Access: Thrombosed AVF. s/p  thrombectomy via IR 7/25.     #hyperkalemia: Now resolved   #MBD: Ca marginally low, Phos OK.    #HTN/ Volume: BP stable. UF with HD  #anemia : Hg  low. Received  INDER with HD yesterday. pRBC per primary team with HD       Rest as per Dr. Carmencita Zhou MD  O: [phone]  Contact me on teams

## 2023-07-26 NOTE — PROGRESS NOTE ADULT - PROBLEM SELECTOR PLAN 2
Hgb 7.7  - EPO 10,000 U subQ with HD today 7/25  - would obtain iron studies  - 1u PRBC with HD today

## 2023-07-26 NOTE — PROGRESS NOTE ADULT - SUBJECTIVE AND OBJECTIVE BOX
[facility] DIVISION OF KIDNEY DISEASES AND HYPERTENSION   FOLLOW UP NOTE    --------------------------------------------------------------------------------  Chief Complaint:    24 hour events/subjective: Pt. was seen and examined today. Overnight events noted. Had thrombectomy of LUE AVF by IR on 7/25, had HD afterwards with no issue with .       PAST HISTORY  --------------------------------------------------------------------------------  No significant changes to PMH, PSH, FHx, SHx, unless otherwise noted    ALLERGIES & MEDICATIONS  --------------------------------------------------------------------------------  Allergies    No Known Allergies    Intolerances      Standing Inpatient Medications  carvedilol 3.125 milliGRAM(s) Oral every 12 hours  chlorhexidine 2% Cloths 1 Application(s) Topical <User Schedule>  danazol 200 milliGRAM(s) Oral three times a day  epoetin filiberto (PROCRIT) Injectable 59032 Unit(s) SubCutaneous <User Schedule>  gabapentin 100 milliGRAM(s) Oral daily  lactated ringers. 1000 milliLiter(s) IV Continuous <Continuous>  lidocaine   4% Patch 1 Patch Transdermal daily  Nephro-deonna 1 Tablet(s) Oral daily  pantoprazole    Tablet 40 milliGRAM(s) Oral before breakfast  sodium chloride 0.9%. 1000 milliLiter(s) IV Continuous <Continuous>    PRN Inpatient Medications  acetaminophen     Tablet .. 650 milliGRAM(s) Oral every 6 hours PRN  fentaNYL    Injectable 12.5 MICROGram(s) IV Push every 5 minutes PRN  HYDROmorphone  Injectable 0.25 milliGRAM(s) IV Push every 10 minutes PRN  melatonin 3 milliGRAM(s) Oral at bedtime PRN  ondansetron Injectable 4 milliGRAM(s) IV Push every 8 hours PRN  ondansetron Injectable 4 milliGRAM(s) IV Push once PRN      VITALS/PHYSICAL EXAM  --------------------------------------------------------------------------------  T(C): 36.4 (07-26-23 @ 14:00), Max: 37.1 (07-26-23 @ 04:36)  HR: 76 (07-26-23 @ 14:00) (70 - 87)  BP: 117/56 (07-26-23 @ 14:00) (100/68 - 142/80)  RR: 17 (07-26-23 @ 14:00) (17 - 18)  SpO2: 98% (07-26-23 @ 14:00) (95% - 100%)  Wt(kg): --  Height (cm): 157.5 (07-25-23 @ 14:51)  Weight (kg): 56.7 (07-25-23 @ 14:51)  BMI (kg/m2): 22.9 (07-25-23 @ 14:51)  BSA (m2): 1.57 (07-25-23 @ 14:51)    07-25-23 @ 07:01  -  07-26-23 @ 07:00  --------------------------------------------------------  IN: 0 mL / OUT: 2000 mL / NET: -2000 mL    07-26-23 @ 07:01  -  07-26-23 @ 15:28  --------------------------------------------------------  IN: 610 mL / OUT: 0 mL / NET: 610 mL      Gen: in no apparent distress  Neck: Supple, no JVD  Lungs: no rhonchi, no wheeze, no crackles  CVS: S1 S2 no M/R/G  Abdomen: no tenderness, no organomegaly, BS present  Neuro: Grossly intact  Skin: warm, dry  Ext: no cyanosis or clubbing, no edema  Access: MARIETTA AVF now with bruit and palpable thrill    LABS/STUDIES  --------------------------------------------------------------------------------              7.7    3.89  >-----------<  73       [07-26-23 @ 06:34]              23.9     138  |  99  |  24  ----------------------------<  83      [07-26-23 @ 06:34]  4.1   |  25  |  6.81        Ca     8.6     [07-26-23 @ 06:34]      Mg     2.2     [07-26-23 @ 06:34]      Phos  3.1     [07-26-23 @ 06:34]    TPro  7.0  /  Alb  3.9  /  TBili  1.3  /  DBili  x   /  AST  13  /  ALT  10  /  AlkPhos  73  [07-25-23 @ 06:01]          Creatinine Trend:  SCr 6.81 [07-26 @ 06:34]  SCr 11.66 [07-25 @ 06:01]  SCr 11.42 [07-25 @ 02:27]  SCr 11.03 [07-24 @ 15:07]  SCr 10.43 [07-24 @ 05:01]    Urinalysis - [07-26-23 @ 06:34]      Color  / Appearance  / SG  / pH       Gluc 83 / Ketone   / Bili  / Urobili        Blood  / Protein  / Leuk Est  / Nitrite       RBC  / WBC  / Hyaline  / Gran  / Sq Epi  / Non Sq Epi  / Bacteria

## 2023-07-26 NOTE — PROGRESS NOTE ADULT - ASSESSMENT
62 year old female with history of myelofibrosis, polycythemia vera, ESRD on MWF schedule via LUE AVF (last dialysis W), portal HTN c/b IGV1, subclavian port for transfusions, gastric varices, gallstones, ERCP 9/22/2022 s/p cholecystectomy with txp surgery .AVF created in 2017 with Dr. Dempsey L AVF with basilic vein transposition. Hx of thrombosis of AVF through last few years, treated by IR with mechanical with plasty and stent placement and medical thrombectomies- most recent in 2019 and 2020 ([facility]).     Patient able to tolerate HD after thrombectomy with IR last night. No role for further vascular surgery intervention at this time. Vascular Surgery to sign off. Patient can f/u with Dr. Dempsey outpatient.     Plan:  -No acute vascular surgery intervention  -Vascular surgery to sign off    1360

## 2023-07-26 NOTE — PROGRESS NOTE ADULT - ASSESSMENT
63F w/  myelofibrosis, polycythemia vera, ESRD on MWF schedule via LUE AVF, portal HTN c/b IGV1, subclavian port for transfusions, gastric varices, presents for clotted fistula and inability to undergo dialysis, pending access and thrombectomy by IR today to resume HD  63F w/  myelofibrosis, polycythemia vera, ESRD on MWF schedule via LUE AVF, portal HTN c/b IGV1, subclavian port for transfusions, gastric varices, presents for clotted fistula and inability to undergo dialysis, s/p thrombectomy and one HD session by IR on 7/25.

## 2023-07-26 NOTE — DISCHARGE NOTE NURSING/CASE MANAGEMENT/SOCIAL WORK - NSDCVIVACCINE_GEN_ALL_CORE_FT
influenza, injectable, quadrivalent, preservative free; 20-Nov-2014 12:44; Aston Ulloa (RN); [employer]; [id number]; IntraMuscular; Deltoid Left.; 0.5 milliLiter(s);   influenza, injectable, quadrivalent, preservative free; 05-Oct-2016 18:15; Rosario James (RN); [employer]; [id number]; IntraMuscular; Deltoid Left.; 0.5 milliLiter(s); VIS (VIS Published: 07-Aug-2015, VIS Presented: 05-Oct-2016);

## 2023-07-26 NOTE — PROGRESS NOTE ADULT - PROBLEM SELECTOR PLAN 2
mild hyperkalemia , no other indication for urgent HD , will check EKG , if no changes can treat w/ Lokelma   - Renal following  - renal diet  - trend serum K : 5.0 on 7/24, 4.7 on 7/25 AM  - Kayexalate   - monitor phos  - nephro deonna  - f/u with nephrology about HD once pt has gotten IR thrombectomy  -continue to monitor BMP closely -Pt missed dialysis due to clotted AVF, s/p IR thrombectomy on 7/25 and 1 session of HD  -Will get another HD session today 7/26, then can resume on MWF schedule  -mild hyperkalemia initially, now resolving s/p Lokelma,   -serum K : 5.0 on 7/24, 4.7 on 7/25 AM, 4.1 on 7/26  - Renal following  - renal diet  - monitor phos  - nephro deonna  -continue to monitor BMP  -Case management working on setting up outpatient HD

## 2023-07-26 NOTE — PROGRESS NOTE ADULT - PROBLEM SELECTOR PLAN 3
no active bleeding , required transfusions in past and h/o iron overload   -Hb currently stable at 8.1  -f/u Hb daily   - transfuse PRBC 1 U if hgb < 7 and give a dose of lasix afterwards to avoid overload no active bleeding , required transfusions in past and h/o iron overload   -Hb 7/26: 7.7, will give 1 unit prbcs with dialysis today  -f/u post transfusion cbc,   -trend Hb daily   -f/u iron studies

## 2023-07-27 VITALS
OXYGEN SATURATION: 96 % | DIASTOLIC BLOOD PRESSURE: 72 MMHG | HEART RATE: 74 BPM | SYSTOLIC BLOOD PRESSURE: 115 MMHG | RESPIRATION RATE: 18 BRPM | TEMPERATURE: 98 F

## 2023-07-27 PROCEDURE — 96375 TX/PRO/DX INJ NEW DRUG ADDON: CPT

## 2023-07-27 PROCEDURE — 93931 UPPER EXTREMITY STUDY: CPT

## 2023-07-27 PROCEDURE — 83735 ASSAY OF MAGNESIUM: CPT

## 2023-07-27 PROCEDURE — 82962 GLUCOSE BLOOD TEST: CPT

## 2023-07-27 PROCEDURE — C1887: CPT

## 2023-07-27 PROCEDURE — 83880 ASSAY OF NATRIURETIC PEPTIDE: CPT

## 2023-07-27 PROCEDURE — 99239 HOSP IP/OBS DSCHRG MGMT >30: CPT | Mod: GC

## 2023-07-27 PROCEDURE — 84100 ASSAY OF PHOSPHORUS: CPT

## 2023-07-27 PROCEDURE — 86850 RBC ANTIBODY SCREEN: CPT

## 2023-07-27 PROCEDURE — 75710 ARTERY X-RAYS ARM/LEG: CPT

## 2023-07-27 PROCEDURE — 99261: CPT

## 2023-07-27 PROCEDURE — 86901 BLOOD TYPING SEROLOGIC RH(D): CPT

## 2023-07-27 PROCEDURE — 85025 COMPLETE CBC W/AUTO DIFF WBC: CPT

## 2023-07-27 PROCEDURE — 83550 IRON BINDING TEST: CPT

## 2023-07-27 PROCEDURE — 80053 COMPREHEN METABOLIC PANEL: CPT

## 2023-07-27 PROCEDURE — 85027 COMPLETE CBC AUTOMATED: CPT

## 2023-07-27 PROCEDURE — C1769: CPT

## 2023-07-27 PROCEDURE — 87640 STAPH A DNA AMP PROBE: CPT

## 2023-07-27 PROCEDURE — 83036 HEMOGLOBIN GLYCOSYLATED A1C: CPT

## 2023-07-27 PROCEDURE — 36904 THRMBC/NFS DIALYSIS CIRCUIT: CPT

## 2023-07-27 PROCEDURE — 71045 X-RAY EXAM CHEST 1 VIEW: CPT

## 2023-07-27 PROCEDURE — 87641 MR-STAPH DNA AMP PROBE: CPT

## 2023-07-27 PROCEDURE — 96374 THER/PROPH/DIAG INJ IV PUSH: CPT

## 2023-07-27 PROCEDURE — 80048 BASIC METABOLIC PNL TOTAL CA: CPT

## 2023-07-27 PROCEDURE — 93005 ELECTROCARDIOGRAM TRACING: CPT

## 2023-07-27 PROCEDURE — P9040: CPT

## 2023-07-27 PROCEDURE — 85610 PROTHROMBIN TIME: CPT

## 2023-07-27 PROCEDURE — 85730 THROMBOPLASTIN TIME PARTIAL: CPT

## 2023-07-27 PROCEDURE — 99285 EMERGENCY DEPT VISIT HI MDM: CPT | Mod: 25

## 2023-07-27 PROCEDURE — 36430 TRANSFUSION BLD/BLD COMPNT: CPT

## 2023-07-27 PROCEDURE — C1757: CPT

## 2023-07-27 PROCEDURE — 86902 BLOOD TYPE ANTIGEN DONOR EA: CPT

## 2023-07-27 PROCEDURE — 86900 BLOOD TYPING SEROLOGIC ABO: CPT

## 2023-07-27 PROCEDURE — 86922 COMPATIBILITY TEST ANTIGLOB: CPT

## 2023-07-27 PROCEDURE — 36140 INTRO NDL ICATH UPR/LXTR ART: CPT

## 2023-07-27 PROCEDURE — 83540 ASSAY OF IRON: CPT

## 2023-07-27 PROCEDURE — 81001 URINALYSIS AUTO W/SCOPE: CPT

## 2023-07-27 PROCEDURE — C1725: CPT

## 2023-07-27 PROCEDURE — 36415 COLL VENOUS BLD VENIPUNCTURE: CPT

## 2023-07-27 PROCEDURE — C1894: CPT

## 2023-07-27 RX ORDER — HEPARIN SODIUM 5000 [USP'U]/ML
300 INJECTION INTRAVENOUS; SUBCUTANEOUS ONCE
Refills: 0 | Status: DISCONTINUED | OUTPATIENT
Start: 2023-07-27 | End: 2023-07-27

## 2023-07-27 RX ADMIN — Medication 1 TABLET(S): at 12:05

## 2023-07-27 RX ADMIN — DANAZOL 200 MILLIGRAM(S): 200 CAPSULE ORAL at 05:13

## 2023-07-27 RX ADMIN — CHLORHEXIDINE GLUCONATE 1 APPLICATION(S): 213 SOLUTION TOPICAL at 05:13

## 2023-07-27 RX ADMIN — PANTOPRAZOLE SODIUM 40 MILLIGRAM(S): 20 TABLET, DELAYED RELEASE ORAL at 05:13

## 2023-07-27 RX ADMIN — DANAZOL 200 MILLIGRAM(S): 200 CAPSULE ORAL at 14:29

## 2023-07-27 RX ADMIN — CARVEDILOL PHOSPHATE 3.12 MILLIGRAM(S): 80 CAPSULE, EXTENDED RELEASE ORAL at 05:13

## 2023-07-27 NOTE — PROGRESS NOTE ADULT - PROBLEM SELECTOR PLAN 7
- heme/onc consult to be arranged by day team   - hold eDrik until further evaluated by heme/onc  -f/u heme onc recs

## 2023-07-27 NOTE — PROGRESS NOTE ADULT - ASSESSMENT
63F w/  myelofibrosis, polycythemia vera, ESRD on MWF schedule via LUE AVF, portal HTN c/b IGV1, subclavian port for transfusions, gastric varices, presents for clotted fistula and inability to undergo dialysis, s/p thrombectomy and one HD session by IR on 7/25.

## 2023-07-27 NOTE — PROGRESS NOTE ADULT - REASON FOR ADMISSION
clotted AVF and missed HD

## 2023-07-27 NOTE — PROGRESS NOTE ADULT - PROBLEM SELECTOR PLAN 4
-consulted heme onc regarding Jakafi   - trend platelets  -Plt downtrending initially to 58, now 73 on 7/26: likely secondary to uremia

## 2023-07-27 NOTE — PROGRESS NOTE ADULT - PROBLEM SELECTOR PLAN 1
s/p IR thrombectomy on 7/25, avf salvaged, pt received one HD session as well  plan to give pt one more session of HD today 7/26, then pt can resume on [facility] schedule outpatient

## 2023-07-27 NOTE — PROGRESS NOTE ADULT - PROBLEM SELECTOR PLAN 3
no active bleeding , required transfusions in past and h/o iron overload   -Hb 7/26: 7.7, will give 1 unit prbcs with dialysis today  -f/u post transfusion cbc,   -trend Hb daily   -f/u iron studies

## 2023-07-27 NOTE — PROGRESS NOTE ADULT - PROVIDER SPECIALTY LIST ADULT
Nephrology-Cardiorenal
Vascular Surgery
Intervent Radiology
Nephrology
Internal Medicine
Internal Medicine
Nephrology
Internal Medicine

## 2023-07-27 NOTE — PROGRESS NOTE ADULT - PROBLEM SELECTOR PLAN 6
- heme/onc consult to be arranged by day team    - continue Danazol

## 2023-07-27 NOTE — PROGRESS NOTE ADULT - ATTENDING COMMENTS
Detail Level: Generalized Azithromycin Counseling:  I discussed with the patient the risks of azithromycin including but not limited to GI upset, allergic reaction, drug rash, diarrhea, and yeast infections. Use Enhanced Medication Counseling?: No High Dose Vitamin A Counseling: Side effects reviewed, pt to contact office should one occur. Benzoyl Peroxide Pregnancy And Lactation Text: This medication is Pregnancy Category C. It is unknown if benzoyl peroxide is excreted in breast milk. Birth Control Pills Pregnancy And Lactation Text: This medication should be avoided if pregnant and for the first 30 days post-partum. Topical Clindamycin Counseling: Patient counseled that this medication may cause skin irritation or allergic reactions.  In the event of skin irritation, the patient was advised to reduce the amount of the drug applied or use it less frequently.   The patient verbalized understanding of the proper use and possible adverse effects of clindamycin.  All of the patient's questions and concerns were addressed. Spironolactone Pregnancy And Lactation Text: This medication can cause feminization of the male fetus and should be avoided during pregnancy. The active metabolite is also found in breast milk. Topical Retinoid counseling:  Patient advised to apply a pea-sized amount only at bedtime and wait 30 minutes after washing their face before applying.  If too drying, patient may add a non-comedogenic moisturizer. The patient verbalized understanding of the proper use and possible adverse effects of retinoids.  All of the patient's questions and concerns were addressed. Topical Clindamycin Pregnancy And Lactation Text: This medication is Pregnancy Category B and is considered safe during pregnancy. It is unknown if it is excreted in breast milk. Erythromycin Counseling:  I discussed with the patient the risks of erythromycin including but not limited to GI upset, allergic reaction, drug rash, diarrhea, increase in liver enzymes, and yeast infections. Dapsone Counseling: I discussed with the patient the risks of dapsone including but not limited to hemolytic anemia, agranulocytosis, rashes, methemoglobinemia, kidney failure, peripheral neuropathy, headaches, GI upset, and liver toxicity.  Patients who start dapsone require monitoring including baseline LFTs and weekly CBCs for the first month, then every month thereafter.  The patient verbalized understanding of the proper use and possible adverse effects of dapsone.  All of the patient's questions and concerns were addressed. Tetracycline Counseling: Patient counseled regarding possible photosensitivity and increased risk for sunburn.  Patient instructed to avoid sunlight, if possible.  When exposed to sunlight, patients should wear protective clothing, sunglasses, and sunscreen.  The patient was instructed to call the office immediately if the following severe adverse effects occur:  hearing changes, easy bruising/bleeding, severe headache, or vision changes.  The patient verbalized understanding of the proper use and possible adverse effects of tetracycline.  All of the patient's questions and concerns were addressed. Patient understands to avoid pregnancy while on therapy due to potential birth defects. Azithromycin Pregnancy And Lactation Text: This medication is considered safe during pregnancy and is also secreted in breast milk. High Dose Vitamin A Pregnancy And Lactation Text: High dose vitamin A therapy is contraindicated during pregnancy and breast feeding. Patient is a 63 year old female, with PMH of myelofibrosis, polycythemia vera, ESRD on MWF schedule via LUE AVF, portal HTN c/b IGV1, subclavian port for transfusions, gastric varices, presents for clotted fistula and inability to undergo dialysis.    - patient with occlusive thrombus in AVF  - IR recs noted; s/p thrombectomy 7/25; undergoing HD successfully   - potassium within normal limits   - continue home HD schedule of M/W/F   - monitor BMP daily for now    - s/p 3 PRBC during hospital stay; no active bleeding noted; possible ACD in setting of ESRD   - monitor CBC and BMP daily     DC planning to home today   40 mins spent on DC planning      Rest as above. Discussed with HS. Erythromycin Pregnancy And Lactation Text: This medication is Pregnancy Category B and is considered safe during pregnancy. It is also excreted in breast milk. Topical Sulfur Applications Counseling: Topical Sulfur Counseling: Patient counseled that this medication may cause skin irritation or allergic reactions.  In the event of skin irritation, the patient was advised to reduce the amount of the drug applied or use it less frequently.   The patient verbalized understanding of the proper use and possible adverse effects of topical sulfur application.  All of the patient's questions and concerns were addressed. Dapsone Pregnancy And Lactation Text: This medication is Pregnancy Category C and is not considered safe during pregnancy or breast feeding. Tetracycline Pregnancy And Lactation Text: This medication is Pregnancy Category D and not consider safe during pregnancy. It is also excreted in breast milk. Bactrim Counseling:  I discussed with the patient the risks of sulfa antibiotics including but not limited to GI upset, allergic reaction, drug rash, diarrhea, dizziness, photosensitivity, and yeast infections.  Rarely, more serious reactions can occur including but not limited to aplastic anemia, agranulocytosis, methemoglobinemia, blood dyscrasias, liver or kidney failure, lung infiltrates or desquamative/blistering drug rashes. Detail Level: Zone Minocycline Counseling: Patient advised regarding possible photosensitivity and discoloration of the teeth, skin, lips, tongue and gums.  Patient instructed to avoid sunlight, if possible.  When exposed to sunlight, patients should wear protective clothing, sunglasses, and sunscreen.  The patient was instructed to call the office immediately if the following severe adverse effects occur:  hearing changes, easy bruising/bleeding, severe headache, or vision changes.  The patient verbalized understanding of the proper use and possible adverse effects of minocycline.  All of the patient's questions and concerns were addressed. Topical Retinoid Pregnancy And Lactation Text: This medication is Pregnancy Category C. It is unknown if this medication is excreted in breast milk. Bactrim Pregnancy And Lactation Text: This medication is Pregnancy Category D and is known to cause fetal risk.  It is also excreted in breast milk. Isotretinoin Counseling: Patient should get monthly blood tests, not donate blood, not drive at night if vision affected, not share medication, and not undergo elective surgery for 6 months after tx completed. Side effects reviewed, pt to contact office should one occur. Tazorac Counseling:  Patient advised that medication is irritating and drying.  Patient may need to apply sparingly and wash off after an hour before eventually leaving it on overnight.  The patient verbalized understanding of the proper use and possible adverse effects of tazorac.  All of the patient's questions and concerns were addressed. Topical Sulfur Applications Pregnancy And Lactation Text: This medication is Pregnancy Category C and has an unknown safety profile during pregnancy. It is unknown if this topical medication is excreted in breast milk. Doxycycline Counseling:  Patient counseled regarding possible photosensitivity and increased risk for sunburn.  Patient instructed to avoid sunlight, if possible.  When exposed to sunlight, patients should wear protective clothing, sunglasses, and sunscreen.  The patient was instructed to call the office immediately if the following severe adverse effects occur:  hearing changes, easy bruising/bleeding, severe headache, or vision changes.  The patient verbalized understanding of the proper use and possible adverse effects of doxycycline.  All of the patient's questions and concerns were addressed. Benzoyl Peroxide Counseling: Patient counseled that medicine may cause skin irritation and bleach clothing.  In the event of skin irritation, the patient was advised to reduce the amount of the drug applied or use it less frequently.   The patient verbalized understanding of the proper use and possible adverse effects of benzoyl peroxide.  All of the patient's questions and concerns were addressed. Tazorac Pregnancy And Lactation Text: This medication is not safe during pregnancy. It is unknown if this medication is excreted in breast milk. Spironolactone Counseling: Patient advised regarding risks of diarrhea, abdominal pain, hyperkalemia, birth defects (for female patients), liver toxicity and renal toxicity. The patient may need blood work to monitor liver and kidney function and potassium levels while on therapy. The patient verbalized understanding of the proper use and possible adverse effects of spironolactone.  All of the patient's questions and concerns were addressed. Isotretinoin Pregnancy And Lactation Text: This medication is Pregnancy Category X and is considered extremely dangerous during pregnancy. It is unknown if it is excreted in breast milk. Doxycycline Pregnancy And Lactation Text: This medication is Pregnancy Category D and not consider safe during pregnancy. It is also excreted in breast milk but is considered safe for shorter treatment courses. Birth Control Pills Counseling: Birth Control Pill Counseling: I discussed with the patient the potential side effects of OCPs including but not limited to increased risk of stroke, heart attack, thrombophlebitis, deep venous thrombosis, hepatic adenomas, breast changes, GI upset, headaches, and depression.  The patient verbalized understanding of the proper use and possible adverse effects of OCPs. All of the patient's questions and concerns were addressed.

## 2023-07-27 NOTE — PROGRESS NOTE ADULT - PROBLEM SELECTOR PLAN 2
-Pt missed dialysis due to clotted AVF, s/p IR thrombectomy on 7/25 and 1 session of HD  -Will get another HD session today 7/26, then can resume on MWF schedule  -mild hyperkalemia initially, now resolving s/p Lokelma,   -serum K : 5.0 on 7/24, 4.7 on 7/25 AM, 4.1 on 7/26  - Renal following  - renal diet  - monitor phos  - nephro deonna  -continue to monitor BMP  -Case management working on setting up outpatient HD

## 2023-07-27 NOTE — PROGRESS NOTE ADULT - SUBJECTIVE AND OBJECTIVE BOX
Patient is a 63y old  Female who presents with a chief complaint of clotted AVF and missed HD (26 Jul 2023 15:28)      SUBJECTIVE / OVERNIGHT EVENTS: Patient seen and examined at bedside. VENU. Pt received dialysis yesterday as well, to resume her regular MWF schedule, tolerated well. No complaints.     MEDICATIONS  (STANDING):  carvedilol 3.125 milliGRAM(s) Oral every 12 hours  chlorhexidine 2% Cloths 1 Application(s) Topical <User Schedule>  danazol 200 milliGRAM(s) Oral three times a day  epoetin filiberto (PROCRIT) Injectable 61081 Unit(s) SubCutaneous <User Schedule>  gabapentin 100 milliGRAM(s) Oral daily  lidocaine   4% Patch 1 Patch Transdermal daily  Nephro-deonna 1 Tablet(s) Oral daily  pantoprazole    Tablet 40 milliGRAM(s) Oral before breakfast  senna 2 Tablet(s) Oral at bedtime    MEDICATIONS  (PRN):  acetaminophen     Tablet .. 650 milliGRAM(s) Oral every 6 hours PRN Temp greater or equal to 38C (100.4F), Mild Pain (1 - 3)  melatonin 3 milliGRAM(s) Oral at bedtime PRN Insomnia  ondansetron Injectable 4 milliGRAM(s) IV Push every 8 hours PRN Nausea and/or Vomiting      Vital Signs Last 24 Hrs  T(C): 36.8 (27 Jul 2023 04:29), Max: 36.8 (26 Jul 2023 20:30)  T(F): 98.2 (27 Jul 2023 04:29), Max: 98.2 (26 Jul 2023 20:30)  HR: 70 (27 Jul 2023 04:29) (70 - 86)  BP: 108/68 (27 Jul 2023 04:29) (95/60 - 117/56)  BP(mean): --  RR: 18 (27 Jul 2023 04:29) (17 - 19)  SpO2: 95% (27 Jul 2023 04:29) (95% - 98%)    Parameters below as of 27 Jul 2023 04:29  Patient On (Oxygen Delivery Method): room air      CAPILLARY BLOOD GLUCOSE      POCT Blood Glucose.: 315 mg/dL (26 Jul 2023 21:26)  POCT Blood Glucose.: 113 mg/dL (26 Jul 2023 18:07)    I&O's Summary    26 Jul 2023 07:01  -  27 Jul 2023 07:00  --------------------------------------------------------  IN: 610 mL / OUT: 1900 mL / NET: -1290 mL        PHYSICAL EXAM:  GENERAL: NAD, well-developed  HEAD:  Atraumatic, Normocephalic  EYES: EOMI, PERRLA, conjunctiva and sclera clear  NECK: Supple, No JVD  CHEST/LUNG: Clear to auscultation bilaterally; No wheeze  HEART: Regular rate and rhythm; No murmurs, rubs, or gallops  ABDOMEN: Soft, Nontender, Nondistended; Bowel sounds present  EXTREMITIES:  2+ Peripheral Pulses, No clubbing, cyanosis, or edema  PSYCH: AAOx3  NEUROLOGY: non-focal  SKIN: No rashes or lesions    LABS:                        9.7    4.21  )-----------( 90       ( 26 Jul 2023 20:01 )             29.5     07-26    138  |  99  |  24<H>  ----------------------------<  83  4.1   |  25  |  6.81<H>    Ca    8.6      26 Jul 2023 06:34  Phos  3.1     07-26  Mg     2.2     07-26            Urinalysis Basic - ( 26 Jul 2023 06:34 )    Color: x / Appearance: x / SG: x / pH: x  Gluc: 83 mg/dL / Ketone: x  / Bili: x / Urobili: x   Blood: x / Protein: x / Nitrite: x   Leuk Esterase: x / RBC: x / WBC x   Sq Epi: x / Non Sq Epi: x / Bacteria: x        RADIOLOGY & ADDITIONAL TESTS:    Imaging Personally Reviewed:    Consultant(s) Notes Reviewed:      Care Discussed with Consultants/Other Providers:    Emmie Montgomery MD, Internal Medicine Resident

## 2023-07-27 NOTE — PROGRESS NOTE ADULT - PROBLEM SELECTOR PROBLEM 1
ESRD on dialysis
AV fistula occlusion
ESRD on dialysis
AV fistula occlusion

## 2023-08-02 NOTE — ASU DISCHARGE PLAN (ADULT/PEDIATRIC) - FREQUENT HAND WASHING PREVENTS THE SPREAD OF INFECTION.
Pt to  Ca/Vit D from [facility], looking for at least 1200 mg Ca+1000 IU vit D daily  Pt will get self referral mammogram through women[facility] in mid-late December 2023 (per her and system, no order needed)  RTC early Jan 2023 Dr Arabella Winter review mammo Statement Selected

## 2023-08-04 NOTE — ED PROVIDER NOTE - PHYSICAL EXAMINATION
Patient
General: NAD  HEENT: NCAT, PERRL  Cardiac: RRR, no murmurs, 2+ peripheral pulses  Chest: CTAB  Abdomen: RUQ and RLQ ttp, no rebound or guarding. Echymosis in RLQ from heparin. soft, non-distended, bowel sounds present,   Extremities: no peripheral edema, calf tenderness, or leg size discrepancies  Skin: no rashes  Neuro: AAOx4, 5+motor, sensory grossly intact  Psych: mood and affect appropriate

## 2023-08-17 ENCOUNTER — APPOINTMENT (OUTPATIENT)
Dept: ENDOVASCULAR SURGERY | Facility: CLINIC | Age: 63
End: 2023-08-17
Payer: MEDICAID

## 2023-08-17 NOTE — PROGRESS NOTE ADULT - PROBLEM SELECTOR PROBLEM 6
Prophylactic measure ESRD (end stage renal disease) on dialysis [Consultation] : a consultation visit [Patient] : patient [Mother] : mother [Medical Records] : medical records

## 2023-08-18 NOTE — ED PROVIDER NOTE - EMPLOYMENT
Continued Stay SW/CM Assessment/Plan of Care Note       Active Substitute Decision Maker (SDM)    There are no active Substitute Decision Maker (SDM) on file.           Progress note:  SW following for discharge planning. Record reviewed, case discussed in OFTs. Pt planning to discharge to significant other Demetrius's apartment at discharge. Per OFTs, pt waivering if agreeable to wound vac at discharge, no longer wants hospital bed and steff lift at discharge. [facility] following for home care at discharge, have stated will be unable to accept pt if not agreeable to DME and teachings/signing contract. Demetrius to come in this date for wound care teachings and possible therapy teachings.     SW to continue to follow.    See SW/CM flowsheets for other objective data.    Disposition Recommendations:  Preliminary discharge destination: Planned Discharge Destination: Location not determined at this time  SW/CM recommendation for discharge: Home care, 24 Hour assist, Other (comment) (pt refusing [facility])    Discharge Plan/Needs:     Continued Care and Services - Admitted Since 8/4/2023    Coordination has not been started for this encounter.     Continued Care and Services - Linked Episodes Includes continued care and service providers from the active episodes linked to this encounter, which are listed below    Care Transitions Episode start date: 8/5/2023   There are no active outsourced providers for this episode.                   Devices/ Equipment that need to be arranged for discharge:     Accepted   Pending insurance authorization   Others:    Anticipated date of DME availability:     Prior To Hospitalization:    Living Situation: Friends and residing at Apartment    .  Support Systems: Friends   Home Devices/Equipment: Shower chair, Mobility assist device ,   ,    ,      Mobility Assist Devices: Wheelchair   Type of Service Prior to Hospitalization: Other (comment) (boyfriend/friend, caregiver???) ,   ,   ,   ,    ,        Patient/Family discharge goal (s):  Home Care     Resources provided:           Therapy Recommendations for Discharge:   PT:      Last Filed Values       Value Time User    PT Discharge Needs  therapy 5 or more times per week 8/17/2023  3:06 PM Natalie Bob, PT        OT:       Last Filed Values       Value Time User    OT Discharge Needs  therapy 5 or more times per week 8/17/2023  2:57 PM Selene Stewart OT        SLP:    Last Filed Values     None          Mobility Equipment Recommended for Discharge: has power w/c, getting hospital bed and steff      Barriers to Discharge  Identified Barriers to Discharge/Transition Planning: Rehab plan of care obstacle, Consult Pending/Clearance                 N/A

## 2023-08-21 NOTE — HISTORY OF PRESENT ILLNESS
[] : left radiocephalic fistula [FreeTextEntry1] : Dr. Dempsey 9/18/17 [FreeTextEntry4] : Yesterday  [FreeTextEntry5] : Yesterday at  [FreeTextEntry6] : Dr. Koroma

## 2023-08-23 ENCOUNTER — OUTPATIENT (OUTPATIENT)
Dept: OUTPATIENT SERVICES | Facility: HOSPITAL | Age: 63
LOS: 1 days | Discharge: HOME | End: 2023-08-23

## 2023-08-23 DIAGNOSIS — D64.9 ANEMIA, UNSPECIFIED: ICD-10-CM

## 2023-08-23 DIAGNOSIS — I77.0 ARTERIOVENOUS FISTULA, ACQUIRED: Chronic | ICD-10-CM

## 2023-08-23 DIAGNOSIS — Z99.2 DEPENDENCE ON RENAL DIALYSIS: Chronic | ICD-10-CM

## 2023-08-23 DIAGNOSIS — T85.898A OTHER SPECIFIED COMPLICATION OF OTHER INTERNAL PROSTHETIC DEVICES, IMPLANTS AND GRAFTS, INITIAL ENCOUNTER: Chronic | ICD-10-CM

## 2023-08-28 NOTE — ED PROVIDER NOTE - CROS ED SKIN ALL NEG
Size Of Lesion In Cm (Optional): 0 Introduction Text (Please End With A Colon): The following procedure was deferred: schedule with surgeon and location capable of surgical procedures Procedure To Be Performed At Next Visit: Excision Detail Level: Detailed negative...

## 2023-08-31 ENCOUNTER — RESULT REVIEW (OUTPATIENT)
Age: 63
End: 2023-08-31

## 2023-08-31 ENCOUNTER — APPOINTMENT (OUTPATIENT)
Dept: VASCULAR SURGERY | Facility: CLINIC | Age: 63
End: 2023-08-31
Payer: MEDICAID

## 2023-08-31 ENCOUNTER — APPOINTMENT (OUTPATIENT)
Dept: HEMATOLOGY ONCOLOGY | Facility: CLINIC | Age: 63
End: 2023-08-31
Payer: COMMERCIAL

## 2023-08-31 VITALS
HEART RATE: 70 BPM | TEMPERATURE: 97.3 F | WEIGHT: 123.66 LBS | HEIGHT: 62 IN | DIASTOLIC BLOOD PRESSURE: 74 MMHG | OXYGEN SATURATION: 99 % | SYSTOLIC BLOOD PRESSURE: 118 MMHG | RESPIRATION RATE: 16 BRPM | BODY MASS INDEX: 22.76 KG/M2

## 2023-08-31 LAB
ANISOCYTOSIS BLD QL: SLIGHT — SIGNIFICANT CHANGE UP
BASOPHILS # BLD AUTO: 0.12 K/UL — SIGNIFICANT CHANGE UP (ref 0–0.2)
BASOPHILS NFR BLD AUTO: 2 % — SIGNIFICANT CHANGE UP (ref 0–2)
BLASTS # FLD: 1 % — HIGH (ref 0–0)
DACRYOCYTES BLD QL SMEAR: SLIGHT — SIGNIFICANT CHANGE UP
ELLIPTOCYTES BLD QL SMEAR: SLIGHT — SIGNIFICANT CHANGE UP
EOSINOPHIL # BLD AUTO: 0.36 K/UL — SIGNIFICANT CHANGE UP (ref 0–0.5)
EOSINOPHIL NFR BLD AUTO: 6 % — SIGNIFICANT CHANGE UP (ref 0–6)
HCT VFR BLD CALC: 26.6 % — LOW (ref 34.5–45)
HGB BLD-MCNC: 8.6 G/DL — LOW (ref 11.5–15.5)
LYMPHOCYTES # BLD AUTO: 1.55 K/UL — SIGNIFICANT CHANGE UP (ref 1–3.3)
LYMPHOCYTES # BLD AUTO: 26 % — SIGNIFICANT CHANGE UP (ref 13–44)
MCHC RBC-ENTMCNC: 30.1 PG — SIGNIFICANT CHANGE UP (ref 27–34)
MCHC RBC-ENTMCNC: 32.3 G/DL — SIGNIFICANT CHANGE UP (ref 32–36)
MCV RBC AUTO: 93 FL — SIGNIFICANT CHANGE UP (ref 80–100)
METAMYELOCYTES # FLD: 1 % — HIGH (ref 0–0)
MONOCYTES # BLD AUTO: 0.12 K/UL — SIGNIFICANT CHANGE UP (ref 0–0.9)
MONOCYTES NFR BLD AUTO: 2 % — SIGNIFICANT CHANGE UP (ref 2–14)
MYELOCYTES NFR BLD: 3 % — HIGH (ref 0–0)
NEUTROPHILS # BLD AUTO: 3.53 K/UL — SIGNIFICANT CHANGE UP (ref 1.8–7.4)
NEUTROPHILS NFR BLD AUTO: 59 % — SIGNIFICANT CHANGE UP (ref 43–77)
NRBC # BLD: 1 /100 — HIGH (ref 0–0)
NRBC # BLD: SIGNIFICANT CHANGE UP /100 WBCS (ref 0–0)
PLAT MORPH BLD: NORMAL — SIGNIFICANT CHANGE UP
PLATELET # BLD AUTO: 106 K/UL — LOW (ref 150–400)
POIKILOCYTOSIS BLD QL AUTO: SLIGHT — SIGNIFICANT CHANGE UP
POLYCHROMASIA BLD QL SMEAR: SLIGHT — SIGNIFICANT CHANGE UP
RBC # BLD: 2.86 M/UL — LOW (ref 3.8–5.2)
RBC # FLD: 19.2 % — HIGH (ref 10.3–14.5)
RBC BLD AUTO: ABNORMAL
RETICS #: 75.2 K/UL — SIGNIFICANT CHANGE UP (ref 25–125)
RETICS/RBC NFR: 2.6 % — HIGH (ref 0.5–2.5)
SCHISTOCYTES BLD QL AUTO: SLIGHT — SIGNIFICANT CHANGE UP
WBC # BLD: 5.98 K/UL — SIGNIFICANT CHANGE UP (ref 3.8–10.5)
WBC # FLD AUTO: 5.98 K/UL — SIGNIFICANT CHANGE UP (ref 3.8–10.5)

## 2023-08-31 PROCEDURE — 93990 DOPPLER FLOW TESTING: CPT

## 2023-08-31 PROCEDURE — 99213 OFFICE O/P EST LOW 20 MIN: CPT

## 2023-08-31 PROCEDURE — 99214 OFFICE O/P EST MOD 30 MIN: CPT

## 2023-08-31 NOTE — PHYSICAL EXAM
[Normal] : no acute distress, well-nourished, well developed, well appearing [Thrill] : thrill [Pulsatile Thrill] : pulsatile thrill [Aneurysm] : aneurysm [Bleeding] : no bleeding [Hand well perfused] : hand well perfused [Ulcer] : no ulcer [de-identified] : intact

## 2023-08-31 NOTE — HISTORY OF PRESENT ILLNESS
[FreeTextEntry1] : 64 yo female with history of esrd on hd via left upper extremity basilic avf presents for follow up  no issues at this time has had interventions in the past  [] : left basilic fistula

## 2023-08-31 NOTE — DISCUSSION/SUMMARY
[FreeTextEntry1] : 62 yo female with history of esrd on hd via left upper extremity basilic avf presents for follow up   duplex shows  approx 75% in stent stenosis at the proximal upper arm   cc/min will f/u 3 months

## 2023-09-01 ENCOUNTER — APPOINTMENT (OUTPATIENT)
Dept: OPHTHALMOLOGY | Facility: CLINIC | Age: 63
End: 2023-09-01

## 2023-09-04 NOTE — DISCHARGE NOTE NURSING/CASE MANAGEMENT/SOCIAL WORK - NSFLUVACAGEDISCH_IMM_ALL_CORE
September 4, 2023      [facility] [address] [place] [address]  Phone: [phone]  Fax: [phone]       Patient: Mounika Stewart   YOB: 2004  Date of Visit: 09/04/2023    To Whom It May Concern:    Thomas Stewart  was at [facility] on 09/04/2023. The patient may return to work/school on 09/06/2023 with no restrictions. If you have any questions or concerns, or if I can be of further assistance, please do not hesitate to contact me.    Sincerely,      Maria Isabel Brown NP      Adult

## 2023-09-04 NOTE — HISTORY OF PRESENT ILLNESS
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   Over last 3 month period  patient underwent another episode of anemia in April for which she was not tranfused. Followuign this Hg did stabilize at 8.6g/dl as of today. We had icnreased her Jakafi to 20mg 3 times a week on the presumption that it was the MF not the Jakafi that had caused more anemia.    Patient feelign weak, has been to the nephrologist and dialysis for which she had noticed A Bp that had been up and down.   Rosalbatent hada transfusion appointmetn in June, but I do not actually beleive.   In June hg 8g/dl and pateint did not require tranfsuion at hte time, but in July pateitn was actualyl admitted to hospital and tarnfused.  Hg today 8/31 was  8.6g/dl followign transfsuion.  A tthe time he fistula was not working, Paitn was admitted and at the time Hg was low at the hospital so patient was tranfsused in July there.    Patient is complaint wit hthe danazole 2000mg 3 tablets daily. was transfused twice durign the 7/23 hospital stay for a Hg 6.9 then later 7.5 was 8.6 upon discahrge.    Just pickedu p the new bottle of 20mg Jakafi, asked her to finish that and then go down to 10mg.

## 2023-09-04 NOTE — ASSESSMENT
[FreeTextEntry1] : This is a 62 woman with a history of Polycythemia Vera, MAK 2 + since 2012. 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  She is now under my super vision for the P. Vera.  Jakafi was initially started at 15mg TIW.  Dose was decreased to 10mg TIW due to anemia though that was likely form the lack of danazol. 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 Would continue this.   Since then patient continued to have some abdominal complaints.   though HG did increase., Danazol was increased to 600mg and Jakafi increased back up to 15mg 3 times a week last October.   Given mor recent anemia on and off, we had not been sure which direction to go in .Aside from transfusion requirements, patient feeling well over the past 3 months on a 20mg 3 times a week dose of Jakafi, Given patient still requiring transfusion, can attempt to lower the dose ack to 10mg again to see if this helps with the anemia. Still unclear whether the current anemia is from progression of disease versus the side effect of the Jakafi.

## 2023-09-04 NOTE — PHYSICAL EXAM
[Normal] : grossly intact [de-identified] : mild splenomegally 2cm below costal amrgin.   [de-identified] : left arm fistula intat

## 2023-09-11 LAB
ALBUMIN SERPL ELPH-MCNC: 5 G/DL
ALP BLD-CCNC: 94 U/L
ALT SERPL-CCNC: 22 U/L
ANION GAP SERPL CALC-SCNC: 18 MMOL/L
AST SERPL-CCNC: 23 U/L
BILIRUB SERPL-MCNC: 1.7 MG/DL
BUN SERPL-MCNC: 26 MG/DL
CALCIUM SERPL-MCNC: 9 MG/DL
CHLORIDE SERPL-SCNC: 94 MMOL/L
CO2 SERPL-SCNC: 27 MMOL/L
CREAT SERPL-MCNC: 5.79 MG/DL
EGFR: 8 ML/MIN/1.73M2
FERRITIN SERPL-MCNC: 3011 NG/ML
FOLATE SERPL-MCNC: >20 NG/ML
GLUCOSE SERPL-MCNC: 130 MG/DL
IRON SATN MFR SERPL: NORMAL %
IRON SERPL-MCNC: 240 UG/DL
POTASSIUM SERPL-SCNC: 4.6 MMOL/L
PROT SERPL-MCNC: 8.2 G/DL
SODIUM SERPL-SCNC: 139 MMOL/L
TIBC SERPL-MCNC: NORMAL UG/DL
UIBC SERPL-MCNC: <20 UG/DL
VIT B12 SERPL-MCNC: 1599 PG/ML

## 2023-09-13 ENCOUNTER — RESULT REVIEW (OUTPATIENT)
Age: 63
End: 2023-09-13

## 2023-09-13 ENCOUNTER — APPOINTMENT (OUTPATIENT)
Dept: HEMATOLOGY ONCOLOGY | Facility: CLINIC | Age: 63
End: 2023-09-13

## 2023-09-13 ENCOUNTER — OUTPATIENT (OUTPATIENT)
Dept: OUTPATIENT SERVICES | Facility: HOSPITAL | Age: 63
LOS: 1 days | End: 2023-09-13
Payer: MEDICAID

## 2023-09-13 DIAGNOSIS — I77.0 ARTERIOVENOUS FISTULA, ACQUIRED: Chronic | ICD-10-CM

## 2023-09-13 DIAGNOSIS — D64.9 ANEMIA, UNSPECIFIED: ICD-10-CM

## 2023-09-13 DIAGNOSIS — T85.898A OTHER SPECIFIED COMPLICATION OF OTHER INTERNAL PROSTHETIC DEVICES, IMPLANTS AND GRAFTS, INITIAL ENCOUNTER: Chronic | ICD-10-CM

## 2023-09-13 DIAGNOSIS — Z99.2 DEPENDENCE ON RENAL DIALYSIS: Chronic | ICD-10-CM

## 2023-09-13 LAB
ANISOCYTOSIS BLD QL: SLIGHT — SIGNIFICANT CHANGE UP
BASOPHILS # BLD AUTO: 0.09 K/UL — SIGNIFICANT CHANGE UP (ref 0–0.2)
BASOPHILS NFR BLD AUTO: 2 % — SIGNIFICANT CHANGE UP (ref 0–2)
DACRYOCYTES BLD QL SMEAR: SLIGHT — SIGNIFICANT CHANGE UP
ELLIPTOCYTES BLD QL SMEAR: SLIGHT — SIGNIFICANT CHANGE UP
EOSINOPHIL # BLD AUTO: 0.05 K/UL — SIGNIFICANT CHANGE UP (ref 0–0.5)
EOSINOPHIL NFR BLD AUTO: 1 % — SIGNIFICANT CHANGE UP (ref 0–6)
HCT VFR BLD CALC: 21.3 % — LOW (ref 34.5–45)
HGB BLD-MCNC: 6.8 G/DL — CRITICAL LOW (ref 11.5–15.5)
LYMPHOCYTES # BLD AUTO: 0.87 K/UL — LOW (ref 1–3.3)
LYMPHOCYTES # BLD AUTO: 19 % — SIGNIFICANT CHANGE UP (ref 13–44)
MCHC RBC-ENTMCNC: 30.8 PG — SIGNIFICANT CHANGE UP (ref 27–34)
MCHC RBC-ENTMCNC: 31.9 G/DL — LOW (ref 32–36)
MCV RBC AUTO: 96.4 FL — SIGNIFICANT CHANGE UP (ref 80–100)
MONOCYTES # BLD AUTO: 0.37 K/UL — SIGNIFICANT CHANGE UP (ref 0–0.9)
MONOCYTES NFR BLD AUTO: 8 % — SIGNIFICANT CHANGE UP (ref 2–14)
MYELOCYTES NFR BLD: 1 % — HIGH (ref 0–0)
NEUTROPHILS # BLD AUTO: 3.17 K/UL — SIGNIFICANT CHANGE UP (ref 1.8–7.4)
NEUTROPHILS NFR BLD AUTO: 69 % — SIGNIFICANT CHANGE UP (ref 43–77)
NRBC # BLD: 1 /100 — HIGH (ref 0–0)
NRBC # BLD: SIGNIFICANT CHANGE UP /100 WBCS (ref 0–0)
PLAT MORPH BLD: NORMAL — SIGNIFICANT CHANGE UP
PLATELET # BLD AUTO: 76 K/UL — LOW (ref 150–400)
POIKILOCYTOSIS BLD QL AUTO: SLIGHT — SIGNIFICANT CHANGE UP
POLYCHROMASIA BLD QL SMEAR: SLIGHT — SIGNIFICANT CHANGE UP
RBC # BLD: 2.21 M/UL — LOW (ref 3.8–5.2)
RBC # FLD: 20.8 % — HIGH (ref 10.3–14.5)
RBC BLD AUTO: ABNORMAL
WBC # BLD: 4.59 K/UL — SIGNIFICANT CHANGE UP (ref 3.8–10.5)
WBC # FLD AUTO: 4.59 K/UL — SIGNIFICANT CHANGE UP (ref 3.8–10.5)

## 2023-09-14 ENCOUNTER — APPOINTMENT (OUTPATIENT)
Dept: INFUSION THERAPY | Facility: HOSPITAL | Age: 63
End: 2023-09-14

## 2023-09-18 NOTE — ED ADULT NURSE NOTE - NS ED NURSE LEVEL OF CONSCIOUSNESS MENTAL STATUS
You have an ear infection (acute otitis media).     Please take your antibiotics as ordered. Complete the full dose even if you are feeling better. You may take your antibiotics with food.     You may take a daily probiotic while on antibiotics.    Follow up if symptoms are worsening or if symptoms are not improving within 2 days of starting antibiotics.     
Awake/Alert

## 2023-09-27 ENCOUNTER — EMERGENCY (EMERGENCY)
Facility: HOSPITAL | Age: 63
LOS: 1 days | Discharge: HOME | End: 2023-09-27
Attending: STUDENT IN AN ORGANIZED HEALTH CARE EDUCATION/TRAINING PROGRAM
Payer: MEDICAID

## 2023-09-27 VITALS
RESPIRATION RATE: 20 BRPM | OXYGEN SATURATION: 96 % | HEIGHT: 62 IN | DIASTOLIC BLOOD PRESSURE: 71 MMHG | SYSTOLIC BLOOD PRESSURE: 107 MMHG | HEART RATE: 87 BPM | TEMPERATURE: 98 F

## 2023-09-27 DIAGNOSIS — T85.898A OTHER SPECIFIED COMPLICATION OF OTHER INTERNAL PROSTHETIC DEVICES, IMPLANTS AND GRAFTS, INITIAL ENCOUNTER: Chronic | ICD-10-CM

## 2023-09-27 DIAGNOSIS — I77.0 ARTERIOVENOUS FISTULA, ACQUIRED: Chronic | ICD-10-CM

## 2023-09-27 DIAGNOSIS — Z99.2 DEPENDENCE ON RENAL DIALYSIS: Chronic | ICD-10-CM

## 2023-09-27 LAB
ALBUMIN SERPL ELPH-MCNC: 4.7 G/DL — SIGNIFICANT CHANGE UP (ref 3.3–5)
ALP SERPL-CCNC: 94 U/L — SIGNIFICANT CHANGE UP (ref 40–120)
ALT FLD-CCNC: 19 U/L — SIGNIFICANT CHANGE UP (ref 10–45)
ANION GAP SERPL CALC-SCNC: 14 MMOL/L — SIGNIFICANT CHANGE UP (ref 5–17)
APTT BLD: 25.8 SEC — SIGNIFICANT CHANGE UP (ref 24.5–35.6)
AST SERPL-CCNC: 19 U/L — SIGNIFICANT CHANGE UP (ref 10–40)
BILIRUB SERPL-MCNC: 1.5 MG/DL — HIGH (ref 0.2–1.2)
BLD GP AB SCN SERPL QL: NEGATIVE — SIGNIFICANT CHANGE UP
BUN SERPL-MCNC: 16 MG/DL — SIGNIFICANT CHANGE UP (ref 7–23)
CALCIUM SERPL-MCNC: 9.5 MG/DL — SIGNIFICANT CHANGE UP (ref 8.4–10.5)
CHLORIDE SERPL-SCNC: 92 MMOL/L — LOW (ref 96–108)
CO2 SERPL-SCNC: 29 MMOL/L — SIGNIFICANT CHANGE UP (ref 22–31)
CREAT SERPL-MCNC: 3.82 MG/DL — HIGH (ref 0.5–1.3)
EGFR: 13 ML/MIN/1.73M2 — LOW
GLUCOSE SERPL-MCNC: 297 MG/DL — HIGH (ref 70–99)
HCT VFR BLD CALC: 23.5 % — LOW (ref 34.5–45)
HGB BLD-MCNC: 7.5 G/DL — LOW (ref 11.5–15.5)
INR BLD: 1.03 RATIO — SIGNIFICANT CHANGE UP (ref 0.85–1.18)
MCHC RBC-ENTMCNC: 30.1 PG — SIGNIFICANT CHANGE UP (ref 27–34)
MCHC RBC-ENTMCNC: 31.9 GM/DL — LOW (ref 32–36)
MCV RBC AUTO: 94.4 FL — SIGNIFICANT CHANGE UP (ref 80–100)
PLATELET # BLD AUTO: 135 K/UL — LOW (ref 150–400)
POTASSIUM SERPL-MCNC: 3.2 MMOL/L — LOW (ref 3.5–5.3)
POTASSIUM SERPL-SCNC: 3.2 MMOL/L — LOW (ref 3.5–5.3)
PROT SERPL-MCNC: 8.1 G/DL — SIGNIFICANT CHANGE UP (ref 6–8.3)
PROTHROM AB SERPL-ACNC: 11.3 SEC — SIGNIFICANT CHANGE UP (ref 9.5–13)
RBC # BLD: 2.49 M/UL — LOW (ref 3.8–5.2)
RBC # FLD: 21.4 % — HIGH (ref 10.3–14.5)
RH IG SCN BLD-IMP: POSITIVE — SIGNIFICANT CHANGE UP
SODIUM SERPL-SCNC: 135 MMOL/L — SIGNIFICANT CHANGE UP (ref 135–145)
WBC # BLD: 6.24 K/UL — SIGNIFICANT CHANGE UP (ref 3.8–10.5)
WBC # FLD AUTO: 6.24 K/UL — SIGNIFICANT CHANGE UP (ref 3.8–10.5)

## 2023-09-27 PROCEDURE — 99285 EMERGENCY DEPT VISIT HI MDM: CPT

## 2023-09-27 NOTE — ED ADULT NURSE NOTE - NSFALLOOBATTEMPT_ED_ALL_ED
----- Message from Zuly Paul MD sent at 9/29/2021 11:13 AM CDT -----  Negative.     
Attempted to reach pt via phone. Left message on voicemail to call back.       Component      Latest Ref Rng & Units 9/27/2021   SARS CoV 2 Qualitative RT PCR       Not Detected   ISOLATION GUIDELINES       This result contains rich text formatting which cannot be displayed here.   Procedural Notes       This result contains rich text formatting which cannot be displayed here.   STREPTOCOCCUS GROUP A PCR      Not Detected Not Detected     
Attempted to reach pt via phone. Left message to call back regarding results.   
Spoke with pt via phone.     Informed of results.     Component      Latest Ref Rng & Units 9/27/2021   SARS CoV 2 Qualitative RT PCR       Not Detected   ISOLATION GUIDELINES       This result contains rich text formatting which cannot be displayed here.   Procedural Notes       This result contains rich text formatting which cannot be displayed here.   STREPTOCOCCUS GROUP A PCR      Not Detected Not Detected     Pt verbalizes understanding and denies any further questions.     Routed to provider as FIDEL.   
No

## 2023-09-27 NOTE — ED ADULT NURSE NOTE - OBJECTIVE STATEMENT
63 y.o F AAO4 ambulatory presents to the ED from dialysis today at 6pm with low HGB. Pt has chronic low HGB and was last transfused three weeks ago. Pt states she tripped and fell today around 1600 before dialysis but denied hitting her head, denies blood thinners. Pt has left AV fistula which was accessed today. Pt has right chest wall Metaport, accessed today with provider to MD order in. Pt has Metaport for chronic transfusions. Pt has PMH of HTN, chronic anemia, ESRD on dialysis. Pt denies CP, HA, vision changes, n/v/d, fevers chills, abdominal pain. Upon assessment, abdomen soft and nontender, +strong peripheral pulses, moving all extremities without difficulty.

## 2023-09-27 NOTE — ED PROVIDER NOTE - CLINICAL SUMMARY MEDICAL DECISION MAKING FREE TEXT BOX
The patient is a 63-year-old female with a history of dialysis, history of multiple transfusions and polycythemia vera who now presents with a low H&H following her dialysis today. Plan for CBC, CMP, coags, type and screen.  We will most likely transfuse and discharge with outpatient follow-up.

## 2023-09-27 NOTE — ED PROVIDER NOTE - ATTENDING CONTRIBUTION TO CARE
Attending MD INES Mojica I performed a history and physical exam of the patient and discussed their management with the resident. I reviewed the resident's note and agree with the documented findings and plan of care, except as noted. My medical decision making and observations are as follows:    63 F with PMH ESRD on HD, anemia requiring multiple transfusions, polycythemia vera referred to ED for hemoglobin 6.3 per routine labs  During hemodialysis.  Patient otherwise in usual state of health; denies fever, chest pain, shortness of breath, cough, abdominal pain, GI symptoms, dysuria.  On exam, patient no acute distress, mild conjunctival pallor, heart lungs clear to auscultation, abdomen soft, no pedal edema.      MDM- 63F with PMHx polycythemia vera and anemia requiring multriple transfusions pw HB <7, will obtain labs to confirm hemoglobin and transfuse as needed.    2300- signed out to Dr. Harris pending labs, transfusion, likely discharge.

## 2023-09-27 NOTE — ED ADULT NURSE NOTE - NSICDXPASTMEDICALHX_GEN_ALL_CORE_FT
PAST MEDICAL HISTORY:  History of myelofibrosis     History of myelofibrosis     Hypertension     Pancreatic cyst     Peptic ulcer disease     Polycythemia vera and secondary myelofibrosis    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    
[place]

## 2023-09-27 NOTE — ED PROVIDER NOTE - PATIENT PORTAL LINK FT
You can access the FollowMyHealth Patient Portal offered by [facility] by registering at the following website: [email or web address]. By joining [facility]’s FollowMyHealth portal, you will also be able to view your health information using other applications (apps) compatible with our system.

## 2023-09-27 NOTE — ED PROVIDER NOTE - NSFOLLOWUPINSTRUCTIONS_ED_ALL_ED_FT
You were evaluated in the emergency department for low hemoglobin.  Our lab work showed he had a hemoglobin of 7.5.  He received 1 unit of packed red blood cells.    Please follow-up with your primary care doctor for further management of your anemia and for repeat blood work.    Please return to the emergency department if you experience increasing lethargy, fainting, dizziness, bleeding, chest pain, shortness of breath, or any other concerning symptoms.

## 2023-09-27 NOTE — ED PROVIDER NOTE - OBJECTIVE STATEMENT
The patient is a 63-year-old female with a history of dialysis, history of multiple transfusions and polycythemia vera who now presents with a low H&H following her dialysis today.  She had a hemoglobin of 6.3.  She denies any shortness of breath, chest pain, abdominal pain.

## 2023-09-27 NOTE — ED PROVIDER NOTE - NS ED MD DISPO DISCHARGE
HPI     1 week fu s/p PKP OD    Last edited by Raghav Ryan MD on 6/12/2020  1:38 PM. (History)            Assessment /Plan     For exam results, see Encounter Report.    Post-operative state    Corneal opacity      PKP stable and clear. Signs and symptoms of graft rejection reviewed.  1 week with few folds, PF qid  Looks great                  Home

## 2023-09-27 NOTE — ED PROVIDER NOTE - PHYSICAL EXAMINATION
Physical Exam:  Gen: NAD, non-toxic appearing  Head: NCAT  HEENT: conjunctiva palor, trachea midline, moist mucous membranes  Lung: CTAB, no respiratory distress, no wheezes/rhonchi/rales B/L, speaking in full sentences  CV: RRR, no murmurs, rubs or gallops  Abd: Soft, NT, ND, no guarding, rigidity, rebound tenderness  MSK: No visible deformities, moves all four extremities   Neuro: No focal motor deficits  Skin: Warm, well perfused, no visible rashes, no leg swelling  Psych: appropriate affect and mood

## 2023-09-27 NOTE — ED ADULT NURSE NOTE - NSFALLHARMRISKINTERV_ED_ALL_ED

## 2023-09-28 ENCOUNTER — APPOINTMENT (OUTPATIENT)
Dept: HEMATOLOGY ONCOLOGY | Facility: CLINIC | Age: 63
End: 2023-09-28

## 2023-09-28 VITALS
HEART RATE: 72 BPM | OXYGEN SATURATION: 100 % | DIASTOLIC BLOOD PRESSURE: 67 MMHG | RESPIRATION RATE: 17 BRPM | TEMPERATURE: 98 F | SYSTOLIC BLOOD PRESSURE: 104 MMHG

## 2023-09-28 LAB
ANISOCYTOSIS BLD QL: SIGNIFICANT CHANGE UP
BASOPHILS # BLD AUTO: 0.22 K/UL — HIGH (ref 0–0.2)
BASOPHILS NFR BLD AUTO: 3.6 % — HIGH (ref 0–2)
DACRYOCYTES BLD QL SMEAR: SLIGHT — SIGNIFICANT CHANGE UP
EOSINOPHIL # BLD AUTO: 0 K/UL — SIGNIFICANT CHANGE UP (ref 0–0.5)
EOSINOPHIL NFR BLD AUTO: 0 % — SIGNIFICANT CHANGE UP (ref 0–6)
LYMPHOCYTES # BLD AUTO: 0.39 K/UL — LOW (ref 1–3.3)
LYMPHOCYTES # BLD AUTO: 6.2 % — LOW (ref 13–44)
MANUAL SMEAR VERIFICATION: SIGNIFICANT CHANGE UP
METAMYELOCYTES # FLD: 2.7 % — HIGH (ref 0–0)
MONOCYTES # BLD AUTO: 0.17 K/UL — SIGNIFICANT CHANGE UP (ref 0–0.9)
MONOCYTES NFR BLD AUTO: 2.7 % — SIGNIFICANT CHANGE UP (ref 2–14)
MYELOCYTES NFR BLD: 3.6 % — HIGH (ref 0–0)
NEUTROPHILS # BLD AUTO: 5.07 K/UL — SIGNIFICANT CHANGE UP (ref 1.8–7.4)
NEUTROPHILS NFR BLD AUTO: 80.3 % — HIGH (ref 43–77)
NEUTS BAND # BLD: 0.9 % — SIGNIFICANT CHANGE UP (ref 0–8)
NRBC # BLD: 3 /100 — HIGH (ref 0–0)
OVALOCYTES BLD QL SMEAR: SLIGHT — SIGNIFICANT CHANGE UP
PLAT MORPH BLD: NORMAL — SIGNIFICANT CHANGE UP
POIKILOCYTOSIS BLD QL AUTO: SIGNIFICANT CHANGE UP
POLYCHROMASIA BLD QL SMEAR: SLIGHT — SIGNIFICANT CHANGE UP
RBC BLD AUTO: ABNORMAL
SCHISTOCYTES BLD QL AUTO: SLIGHT — SIGNIFICANT CHANGE UP

## 2023-09-28 PROCEDURE — 85025 COMPLETE CBC W/AUTO DIFF WBC: CPT

## 2023-09-28 PROCEDURE — 86900 BLOOD TYPING SEROLOGIC ABO: CPT

## 2023-09-28 PROCEDURE — P9040: CPT

## 2023-09-28 PROCEDURE — 86850 RBC ANTIBODY SCREEN: CPT

## 2023-09-28 PROCEDURE — 80053 COMPREHEN METABOLIC PANEL: CPT

## 2023-09-28 PROCEDURE — 85730 THROMBOPLASTIN TIME PARTIAL: CPT

## 2023-09-28 PROCEDURE — 86902 BLOOD TYPE ANTIGEN DONOR EA: CPT

## 2023-09-28 PROCEDURE — 86901 BLOOD TYPING SEROLOGIC RH(D): CPT

## 2023-09-28 PROCEDURE — 99285 EMERGENCY DEPT VISIT HI MDM: CPT | Mod: 25

## 2023-09-28 PROCEDURE — 36430 TRANSFUSION BLD/BLD COMPNT: CPT

## 2023-09-28 PROCEDURE — 86922 COMPATIBILITY TEST ANTIGLOB: CPT

## 2023-09-28 PROCEDURE — 85610 PROTHROMBIN TIME: CPT

## 2023-09-29 ENCOUNTER — APPOINTMENT (OUTPATIENT)
Dept: INFUSION THERAPY | Facility: HOSPITAL | Age: 63
End: 2023-09-29

## 2023-10-01 PROBLEM — K86.89 PANCREATIC MASS: Status: RESOLVED | Noted: 2017-02-02 | Resolved: 2023-10-01

## 2023-10-02 NOTE — ED ADULT TRIAGE NOTE - AS HEIGHT TYPE
10/2/2023    From the office of:   Aaron Mann MD   [facility] [address] [address]  Phone: [phone]    In regards to Mikaela Nation, :  1992    Our office has attempted to contact you to reschedule your appointment multiple times with no success. Please call us at [phone] to discuss your plan of care and reschedule your appointment.     Thanks  Dr. Mann office      stated

## 2023-10-04 ENCOUNTER — APPOINTMENT (OUTPATIENT)
Dept: HEMATOLOGY ONCOLOGY | Facility: CLINIC | Age: 63
End: 2023-10-04

## 2023-10-04 ENCOUNTER — RESULT REVIEW (OUTPATIENT)
Age: 63
End: 2023-10-04

## 2023-10-04 LAB
ANISOCYTOSIS BLD QL: SLIGHT — SIGNIFICANT CHANGE UP
BASOPHILS # BLD AUTO: 0.06 K/UL — SIGNIFICANT CHANGE UP (ref 0–0.2)
BASOPHILS NFR BLD AUTO: 1 % — SIGNIFICANT CHANGE UP (ref 0–2)
DACRYOCYTES BLD QL SMEAR: SLIGHT — SIGNIFICANT CHANGE UP
ELLIPTOCYTES BLD QL SMEAR: SLIGHT — SIGNIFICANT CHANGE UP
EOSINOPHIL # BLD AUTO: 0.12 K/UL — SIGNIFICANT CHANGE UP (ref 0–0.5)
EOSINOPHIL NFR BLD AUTO: 2 % — SIGNIFICANT CHANGE UP (ref 0–6)
HCT VFR BLD CALC: 26.8 % — LOW (ref 34.5–45)
HGB BLD-MCNC: 8.2 G/DL — LOW (ref 11.5–15.5)
HYPOCHROMIA BLD QL: SLIGHT — SIGNIFICANT CHANGE UP
LYMPHOCYTES # BLD AUTO: 1.02 K/UL — SIGNIFICANT CHANGE UP (ref 1–3.3)
LYMPHOCYTES # BLD AUTO: 17 % — SIGNIFICANT CHANGE UP (ref 13–44)
MCHC RBC-ENTMCNC: 30.5 PG — SIGNIFICANT CHANGE UP (ref 27–34)
MCHC RBC-ENTMCNC: 31.4 G/DL — LOW (ref 32–36)
MCV RBC AUTO: 97 FL — SIGNIFICANT CHANGE UP (ref 80–100)
METAMYELOCYTES # FLD: 5 % — HIGH (ref 0–0)
MONOCYTES # BLD AUTO: 0.66 K/UL — SIGNIFICANT CHANGE UP (ref 0–0.9)
MONOCYTES NFR BLD AUTO: 11 % — SIGNIFICANT CHANGE UP (ref 2–14)
MYELOCYTES NFR BLD: 1 % — HIGH (ref 0–0)
NEUTROPHILS # BLD AUTO: 3.77 K/UL — SIGNIFICANT CHANGE UP (ref 1.8–7.4)
NEUTROPHILS NFR BLD AUTO: 63 % — SIGNIFICANT CHANGE UP (ref 43–77)
NRBC # BLD: 3 /100 — HIGH (ref 0–0)
NRBC # BLD: SIGNIFICANT CHANGE UP /100 WBCS (ref 0–0)
PLAT MORPH BLD: NORMAL — SIGNIFICANT CHANGE UP
PLATELET # BLD AUTO: 107 K/UL — LOW (ref 150–400)
POIKILOCYTOSIS BLD QL AUTO: SLIGHT — SIGNIFICANT CHANGE UP
POLYCHROMASIA BLD QL SMEAR: SLIGHT — SIGNIFICANT CHANGE UP
RBC # BLD: 2.69 M/UL — LOW (ref 3.8–5.2)
RBC # FLD: 20.8 % — HIGH (ref 10.3–14.5)
RBC BLD AUTO: ABNORMAL
SCHISTOCYTES BLD QL AUTO: SLIGHT — SIGNIFICANT CHANGE UP
WBC # BLD: 5.98 K/UL — SIGNIFICANT CHANGE UP (ref 3.8–10.5)
WBC # FLD AUTO: 5.98 K/UL — SIGNIFICANT CHANGE UP (ref 3.8–10.5)

## 2023-10-04 PROCEDURE — 86922 COMPATIBILITY TEST ANTIGLOB: CPT

## 2023-10-04 PROCEDURE — 86901 BLOOD TYPING SEROLOGIC RH(D): CPT

## 2023-10-04 PROCEDURE — 86850 RBC ANTIBODY SCREEN: CPT

## 2023-10-04 PROCEDURE — 86900 BLOOD TYPING SEROLOGIC ABO: CPT

## 2023-10-04 PROCEDURE — 86902 BLOOD TYPE ANTIGEN DONOR EA: CPT

## 2023-10-05 ENCOUNTER — APPOINTMENT (OUTPATIENT)
Dept: INFUSION THERAPY | Facility: HOSPITAL | Age: 63
End: 2023-10-05

## 2023-10-10 NOTE — PATIENT PROFILE ADULT. - NS PRO PT RIGHT SUPPORT PERSON
October 10, 2023      Alex Lozano  [address] [facility] [address]        Dear ,    We are writing to inform you of your test results.    The urine test is negative for evidence of bladder infection.  It is clear.    Resulted Orders   UA with Microscopic reflex to Culture   Result Value Ref Range    Color Urine Yellow Colorless, Straw, Light Yellow, Yellow    Appearance Urine Clear Clear    Glucose Urine 500 (A) Negative mg/dL    Bilirubin Urine Negative Negative    Ketones Urine Negative Negative mg/dL    Specific Gravity Urine 1.010 1.003 - 1.035    Blood Urine Trace (A) Negative    pH Urine 5.5 5.0 - 7.0    Protein Albumin Urine 30 (A) Negative mg/dL    Urobilinogen Urine 0.2 0.2, 1.0 E.U./dL    Nitrite Urine Negative Negative    Leukocyte Esterase Urine Negative Negative   UA Microscopic with Reflex to Culture   Result Value Ref Range    Bacteria Urine Few (A) None Seen /HPF    RBC Urine 0-2 0-2 /HPF /HPF    WBC Urine 0-5 0-5 /HPF /HPF    Squamous Epithelials Urine Few (A) None Seen /LPF    Narrative    Urine Culture not indicated       If you have any questions or concerns, please call the clinic at the number listed above.       Sincerely,      Aj Garces MD                              
[place]

## 2023-10-18 ENCOUNTER — RESULT REVIEW (OUTPATIENT)
Age: 63
End: 2023-10-18

## 2023-10-18 ENCOUNTER — APPOINTMENT (OUTPATIENT)
Dept: HEMATOLOGY ONCOLOGY | Facility: CLINIC | Age: 63
End: 2023-10-18

## 2023-10-18 ENCOUNTER — OUTPATIENT (OUTPATIENT)
Dept: OUTPATIENT SERVICES | Facility: HOSPITAL | Age: 63
LOS: 1 days | End: 2023-10-18
Payer: MEDICAID

## 2023-10-18 DIAGNOSIS — Z99.2 DEPENDENCE ON RENAL DIALYSIS: Chronic | ICD-10-CM

## 2023-10-18 DIAGNOSIS — D64.9 ANEMIA, UNSPECIFIED: ICD-10-CM

## 2023-10-18 DIAGNOSIS — I77.0 ARTERIOVENOUS FISTULA, ACQUIRED: Chronic | ICD-10-CM

## 2023-10-18 DIAGNOSIS — T85.898A OTHER SPECIFIED COMPLICATION OF OTHER INTERNAL PROSTHETIC DEVICES, IMPLANTS AND GRAFTS, INITIAL ENCOUNTER: Chronic | ICD-10-CM

## 2023-10-18 LAB
ANISOCYTOSIS BLD QL: SLIGHT — SIGNIFICANT CHANGE UP
ANISOCYTOSIS BLD QL: SLIGHT — SIGNIFICANT CHANGE UP
BASOPHILS # BLD AUTO: 0.05 K/UL — SIGNIFICANT CHANGE UP (ref 0–0.2)
BASOPHILS # BLD AUTO: 0.05 K/UL — SIGNIFICANT CHANGE UP (ref 0–0.2)
BASOPHILS NFR BLD AUTO: 1 % — SIGNIFICANT CHANGE UP (ref 0–2)
BASOPHILS NFR BLD AUTO: 1 % — SIGNIFICANT CHANGE UP (ref 0–2)
DACRYOCYTES BLD QL SMEAR: SLIGHT — SIGNIFICANT CHANGE UP
DACRYOCYTES BLD QL SMEAR: SLIGHT — SIGNIFICANT CHANGE UP
ELLIPTOCYTES BLD QL SMEAR: SLIGHT — SIGNIFICANT CHANGE UP
ELLIPTOCYTES BLD QL SMEAR: SLIGHT — SIGNIFICANT CHANGE UP
EOSINOPHIL # BLD AUTO: 0.15 K/UL — SIGNIFICANT CHANGE UP (ref 0–0.5)
EOSINOPHIL # BLD AUTO: 0.15 K/UL — SIGNIFICANT CHANGE UP (ref 0–0.5)
EOSINOPHIL NFR BLD AUTO: 3 % — SIGNIFICANT CHANGE UP (ref 0–6)
EOSINOPHIL NFR BLD AUTO: 3 % — SIGNIFICANT CHANGE UP (ref 0–6)
HCT VFR BLD CALC: 24.1 % — LOW (ref 34.5–45)
HCT VFR BLD CALC: 24.1 % — LOW (ref 34.5–45)
HGB BLD-MCNC: 7.7 G/DL — LOW (ref 11.5–15.5)
HGB BLD-MCNC: 7.7 G/DL — LOW (ref 11.5–15.5)
HYPOCHROMIA BLD QL: SLIGHT — SIGNIFICANT CHANGE UP
HYPOCHROMIA BLD QL: SLIGHT — SIGNIFICANT CHANGE UP
LYMPHOCYTES # BLD AUTO: 0.51 K/UL — LOW (ref 1–3.3)
LYMPHOCYTES # BLD AUTO: 0.51 K/UL — LOW (ref 1–3.3)
LYMPHOCYTES # BLD AUTO: 10 % — LOW (ref 13–44)
LYMPHOCYTES # BLD AUTO: 10 % — LOW (ref 13–44)
MCHC RBC-ENTMCNC: 31.4 PG — SIGNIFICANT CHANGE UP (ref 27–34)
MCHC RBC-ENTMCNC: 31.4 PG — SIGNIFICANT CHANGE UP (ref 27–34)
MCHC RBC-ENTMCNC: 32 G/DL — SIGNIFICANT CHANGE UP (ref 32–36)
MCHC RBC-ENTMCNC: 32 G/DL — SIGNIFICANT CHANGE UP (ref 32–36)
MCV RBC AUTO: 98.4 FL — SIGNIFICANT CHANGE UP (ref 80–100)
MCV RBC AUTO: 98.4 FL — SIGNIFICANT CHANGE UP (ref 80–100)
MONOCYTES # BLD AUTO: 0.36 K/UL — SIGNIFICANT CHANGE UP (ref 0–0.9)
MONOCYTES # BLD AUTO: 0.36 K/UL — SIGNIFICANT CHANGE UP (ref 0–0.9)
MONOCYTES NFR BLD AUTO: 7 % — SIGNIFICANT CHANGE UP (ref 2–14)
MONOCYTES NFR BLD AUTO: 7 % — SIGNIFICANT CHANGE UP (ref 2–14)
MYELOCYTES NFR BLD: 3 % — HIGH (ref 0–0)
MYELOCYTES NFR BLD: 3 % — HIGH (ref 0–0)
NEUTROPHILS # BLD AUTO: 3.87 K/UL — SIGNIFICANT CHANGE UP (ref 1.8–7.4)
NEUTROPHILS # BLD AUTO: 3.87 K/UL — SIGNIFICANT CHANGE UP (ref 1.8–7.4)
NEUTROPHILS NFR BLD AUTO: 76 % — SIGNIFICANT CHANGE UP (ref 43–77)
NEUTROPHILS NFR BLD AUTO: 76 % — SIGNIFICANT CHANGE UP (ref 43–77)
NRBC # BLD: 5 /100 — HIGH (ref 0–0)
NRBC # BLD: 5 /100 — HIGH (ref 0–0)
NRBC # BLD: SIGNIFICANT CHANGE UP /100 WBCS (ref 0–0)
NRBC # BLD: SIGNIFICANT CHANGE UP /100 WBCS (ref 0–0)
PLAT MORPH BLD: NORMAL — SIGNIFICANT CHANGE UP
PLAT MORPH BLD: NORMAL — SIGNIFICANT CHANGE UP
PLATELET # BLD AUTO: 107 K/UL — LOW (ref 150–400)
PLATELET # BLD AUTO: 107 K/UL — LOW (ref 150–400)
POIKILOCYTOSIS BLD QL AUTO: SLIGHT — SIGNIFICANT CHANGE UP
POIKILOCYTOSIS BLD QL AUTO: SLIGHT — SIGNIFICANT CHANGE UP
POLYCHROMASIA BLD QL SMEAR: SLIGHT — SIGNIFICANT CHANGE UP
POLYCHROMASIA BLD QL SMEAR: SLIGHT — SIGNIFICANT CHANGE UP
RBC # BLD: 2.45 M/UL — LOW (ref 3.8–5.2)
RBC # BLD: 2.45 M/UL — LOW (ref 3.8–5.2)
RBC # FLD: 20.1 % — HIGH (ref 10.3–14.5)
RBC # FLD: 20.1 % — HIGH (ref 10.3–14.5)
RBC BLD AUTO: ABNORMAL
RBC BLD AUTO: ABNORMAL
WBC # BLD: 5.09 K/UL — SIGNIFICANT CHANGE UP (ref 3.8–10.5)
WBC # BLD: 5.09 K/UL — SIGNIFICANT CHANGE UP (ref 3.8–10.5)
WBC # FLD AUTO: 5.09 K/UL — SIGNIFICANT CHANGE UP (ref 3.8–10.5)
WBC # FLD AUTO: 5.09 K/UL — SIGNIFICANT CHANGE UP (ref 3.8–10.5)

## 2023-10-19 ENCOUNTER — APPOINTMENT (OUTPATIENT)
Dept: INFUSION THERAPY | Facility: HOSPITAL | Age: 63
End: 2023-10-19

## 2023-10-19 PROCEDURE — 86902 BLOOD TYPE ANTIGEN DONOR EA: CPT

## 2023-10-19 PROCEDURE — 86901 BLOOD TYPING SEROLOGIC RH(D): CPT

## 2023-10-19 PROCEDURE — 86850 RBC ANTIBODY SCREEN: CPT

## 2023-10-19 PROCEDURE — 86922 COMPATIBILITY TEST ANTIGLOB: CPT

## 2023-10-19 PROCEDURE — 86900 BLOOD TYPING SEROLOGIC ABO: CPT

## 2023-10-23 RX ORDER — FOLIC ACID/VIT B COMPLEX AND C 0.8 MG
0.8 TABLET ORAL
Qty: 90 | Refills: 3 | Status: ACTIVE | COMMUNITY
Start: 2020-10-26 | End: 1900-01-01

## 2023-10-26 ENCOUNTER — APPOINTMENT (OUTPATIENT)
Dept: HEMATOLOGY ONCOLOGY | Facility: CLINIC | Age: 63
End: 2023-10-26

## 2023-10-27 ENCOUNTER — APPOINTMENT (OUTPATIENT)
Dept: INFUSION THERAPY | Facility: HOSPITAL | Age: 63
End: 2023-10-27

## 2023-11-01 PROBLEM — T82.898A INADEQUATE FLOW OF DIALYSIS ARTERIOVENOUS FISTULA: Status: ACTIVE | Noted: 2019-02-28

## 2023-11-02 ENCOUNTER — APPOINTMENT (OUTPATIENT)
Dept: ENDOVASCULAR SURGERY | Facility: CLINIC | Age: 63
End: 2023-11-02
Payer: MEDICAID

## 2023-11-02 ENCOUNTER — RESULT REVIEW (OUTPATIENT)
Age: 63
End: 2023-11-02

## 2023-11-02 VITALS
BODY MASS INDEX: 23.53 KG/M2 | SYSTOLIC BLOOD PRESSURE: 125 MMHG | DIASTOLIC BLOOD PRESSURE: 74 MMHG | HEIGHT: 62 IN | WEIGHT: 127.87 LBS | TEMPERATURE: 97.9 F | HEART RATE: 83 BPM | OXYGEN SATURATION: 100 % | RESPIRATION RATE: 81 BRPM

## 2023-11-02 DIAGNOSIS — T82.898A OTHER SPECIFIED COMPLICATION OF VASCULAR PROSTHETIC DEVICES, IMPLANTS AND GRAFTS, INITIAL ENCOUNTER: ICD-10-CM

## 2023-11-02 PROCEDURE — 36902Z: CUSTOM

## 2023-11-02 PROCEDURE — 36907Z: CUSTOM | Mod: 59

## 2023-11-02 RX ORDER — CARVEDILOL 3.12 MG/1
3.12 TABLET, FILM COATED ORAL TWICE DAILY
Qty: 180 | Refills: 3 | Status: DISCONTINUED | COMMUNITY
Start: 2022-11-03 | End: 2023-11-02

## 2023-11-02 RX ORDER — FAMOTIDINE 20 MG/1
20 TABLET, FILM COATED ORAL DAILY
Qty: 90 | Refills: 3 | Status: DISCONTINUED | COMMUNITY
Start: 2023-01-13 | End: 2023-11-02

## 2023-11-02 RX ORDER — CLOPIDOGREL BISULFATE 75 MG/1
75 TABLET, FILM COATED ORAL DAILY
Qty: 90 | Refills: 3 | Status: DISCONTINUED | COMMUNITY
Start: 2022-07-21 | End: 2023-11-02

## 2023-11-03 NOTE — PROGRESS NOTE ADULT - PROVIDER SPECIALTY LIST ADULT
I was present for the entirety of the procedure(s).  I saw and evaluated the patient. I personally obtained the key and critical portions of the history and physical exam or was physically present for key and critical portions performed by the resident/fellow. I reviewed the resident/fellow's documentation and discussed the patient with the resident/fellow. I agree with the resident/fellow's medical decision making as documented in the note.      Lis Lam MD       Internal Medicine

## 2023-11-15 ENCOUNTER — OUTPATIENT (OUTPATIENT)
Dept: OUTPATIENT SERVICES | Facility: HOSPITAL | Age: 63
LOS: 1 days | Discharge: HOME | End: 2023-11-15

## 2023-11-15 DIAGNOSIS — D64.9 ANEMIA, UNSPECIFIED: ICD-10-CM

## 2023-11-15 DIAGNOSIS — T85.898A OTHER SPECIFIED COMPLICATION OF OTHER INTERNAL PROSTHETIC DEVICES, IMPLANTS AND GRAFTS, INITIAL ENCOUNTER: Chronic | ICD-10-CM

## 2023-11-15 DIAGNOSIS — N18.6 END STAGE RENAL DISEASE: ICD-10-CM

## 2023-11-15 DIAGNOSIS — D75.81 MYELOFIBROSIS: ICD-10-CM

## 2023-11-15 DIAGNOSIS — I77.0 ARTERIOVENOUS FISTULA, ACQUIRED: Chronic | ICD-10-CM

## 2023-11-15 DIAGNOSIS — Z99.2 DEPENDENCE ON RENAL DIALYSIS: Chronic | ICD-10-CM

## 2023-11-15 DIAGNOSIS — D45 POLYCYTHEMIA VERA: ICD-10-CM

## 2023-11-26 ENCOUNTER — INPATIENT (INPATIENT)
Facility: HOSPITAL | Age: 63
LOS: 2 days | Discharge: HOME | DRG: 682 | End: 2023-11-29
Attending: INTERNAL MEDICINE | Admitting: INTERNAL MEDICINE
Payer: MEDICAID

## 2023-11-26 VITALS
OXYGEN SATURATION: 99 % | HEART RATE: 87 BPM | SYSTOLIC BLOOD PRESSURE: 113 MMHG | WEIGHT: 125.66 LBS | RESPIRATION RATE: 20 BRPM | TEMPERATURE: 99 F | HEIGHT: 60 IN | DIASTOLIC BLOOD PRESSURE: 72 MMHG

## 2023-11-26 DIAGNOSIS — N18.9 CHRONIC KIDNEY DISEASE, UNSPECIFIED: ICD-10-CM

## 2023-11-26 LAB
ALBUMIN SERPL ELPH-MCNC: 4.3 G/DL — SIGNIFICANT CHANGE UP (ref 3.3–5)
ALBUMIN SERPL ELPH-MCNC: 4.3 G/DL — SIGNIFICANT CHANGE UP (ref 3.3–5)
ALP SERPL-CCNC: 77 U/L — SIGNIFICANT CHANGE UP (ref 40–120)
ALP SERPL-CCNC: 77 U/L — SIGNIFICANT CHANGE UP (ref 40–120)
ALT FLD-CCNC: 14 U/L — SIGNIFICANT CHANGE UP (ref 10–45)
ALT FLD-CCNC: 14 U/L — SIGNIFICANT CHANGE UP (ref 10–45)
ANION GAP SERPL CALC-SCNC: 17 MMOL/L — SIGNIFICANT CHANGE UP (ref 5–17)
ANION GAP SERPL CALC-SCNC: 17 MMOL/L — SIGNIFICANT CHANGE UP (ref 5–17)
ANISOCYTOSIS BLD QL: SLIGHT — SIGNIFICANT CHANGE UP
ANISOCYTOSIS BLD QL: SLIGHT — SIGNIFICANT CHANGE UP
APTT BLD: 28.4 SEC — SIGNIFICANT CHANGE UP (ref 24.5–35.6)
APTT BLD: 28.4 SEC — SIGNIFICANT CHANGE UP (ref 24.5–35.6)
AST SERPL-CCNC: 15 U/L — SIGNIFICANT CHANGE UP (ref 10–40)
AST SERPL-CCNC: 15 U/L — SIGNIFICANT CHANGE UP (ref 10–40)
BASE EXCESS BLDV CALC-SCNC: 5.4 MMOL/L — HIGH (ref -2–3)
BASE EXCESS BLDV CALC-SCNC: 5.4 MMOL/L — HIGH (ref -2–3)
BASOPHILS # BLD AUTO: 0.22 K/UL — HIGH (ref 0–0.2)
BASOPHILS # BLD AUTO: 0.22 K/UL — HIGH (ref 0–0.2)
BASOPHILS NFR BLD AUTO: 3.7 % — HIGH (ref 0–2)
BASOPHILS NFR BLD AUTO: 3.7 % — HIGH (ref 0–2)
BILIRUB SERPL-MCNC: 1.2 MG/DL — SIGNIFICANT CHANGE UP (ref 0.2–1.2)
BILIRUB SERPL-MCNC: 1.2 MG/DL — SIGNIFICANT CHANGE UP (ref 0.2–1.2)
BUN SERPL-MCNC: 34 MG/DL — HIGH (ref 7–23)
BUN SERPL-MCNC: 34 MG/DL — HIGH (ref 7–23)
CA-I SERPL-SCNC: 1.18 MMOL/L — SIGNIFICANT CHANGE UP (ref 1.15–1.33)
CA-I SERPL-SCNC: 1.18 MMOL/L — SIGNIFICANT CHANGE UP (ref 1.15–1.33)
CALCIUM SERPL-MCNC: 9.1 MG/DL — SIGNIFICANT CHANGE UP (ref 8.4–10.5)
CALCIUM SERPL-MCNC: 9.1 MG/DL — SIGNIFICANT CHANGE UP (ref 8.4–10.5)
CHLORIDE BLDV-SCNC: 102 MMOL/L — SIGNIFICANT CHANGE UP (ref 96–108)
CHLORIDE BLDV-SCNC: 102 MMOL/L — SIGNIFICANT CHANGE UP (ref 96–108)
CHLORIDE SERPL-SCNC: 96 MMOL/L — SIGNIFICANT CHANGE UP (ref 96–108)
CHLORIDE SERPL-SCNC: 96 MMOL/L — SIGNIFICANT CHANGE UP (ref 96–108)
CO2 BLDV-SCNC: 31 MMOL/L — HIGH (ref 22–26)
CO2 BLDV-SCNC: 31 MMOL/L — HIGH (ref 22–26)
CO2 SERPL-SCNC: 25 MMOL/L — SIGNIFICANT CHANGE UP (ref 22–31)
CO2 SERPL-SCNC: 25 MMOL/L — SIGNIFICANT CHANGE UP (ref 22–31)
CREAT SERPL-MCNC: 8.01 MG/DL — HIGH (ref 0.5–1.3)
CREAT SERPL-MCNC: 8.01 MG/DL — HIGH (ref 0.5–1.3)
DACRYOCYTES BLD QL SMEAR: SLIGHT — SIGNIFICANT CHANGE UP
DACRYOCYTES BLD QL SMEAR: SLIGHT — SIGNIFICANT CHANGE UP
EGFR: 5 ML/MIN/1.73M2 — LOW
EGFR: 5 ML/MIN/1.73M2 — LOW
EOSINOPHIL # BLD AUTO: 0.16 K/UL — SIGNIFICANT CHANGE UP (ref 0–0.5)
EOSINOPHIL # BLD AUTO: 0.16 K/UL — SIGNIFICANT CHANGE UP (ref 0–0.5)
EOSINOPHIL NFR BLD AUTO: 2.7 % — SIGNIFICANT CHANGE UP (ref 0–6)
EOSINOPHIL NFR BLD AUTO: 2.7 % — SIGNIFICANT CHANGE UP (ref 0–6)
FLUAV AG NPH QL: SIGNIFICANT CHANGE UP
FLUAV AG NPH QL: SIGNIFICANT CHANGE UP
FLUBV AG NPH QL: SIGNIFICANT CHANGE UP
FLUBV AG NPH QL: SIGNIFICANT CHANGE UP
GAS PNL BLDV: 137 MMOL/L — SIGNIFICANT CHANGE UP (ref 136–145)
GAS PNL BLDV: 137 MMOL/L — SIGNIFICANT CHANGE UP (ref 136–145)
GAS PNL BLDV: SIGNIFICANT CHANGE UP
GAS PNL BLDV: SIGNIFICANT CHANGE UP
GLUCOSE BLDV-MCNC: 144 MG/DL — HIGH (ref 70–99)
GLUCOSE BLDV-MCNC: 144 MG/DL — HIGH (ref 70–99)
GLUCOSE SERPL-MCNC: 143 MG/DL — HIGH (ref 70–99)
GLUCOSE SERPL-MCNC: 143 MG/DL — HIGH (ref 70–99)
HCO3 BLDV-SCNC: 30 MMOL/L — HIGH (ref 22–29)
HCO3 BLDV-SCNC: 30 MMOL/L — HIGH (ref 22–29)
HCT VFR BLD CALC: 20.5 % — CRITICAL LOW (ref 34.5–45)
HCT VFR BLD CALC: 20.5 % — CRITICAL LOW (ref 34.5–45)
HCT VFR BLD CALC: 20.9 % — CRITICAL LOW (ref 34.5–45)
HCT VFR BLDA CALC: 20 % — CRITICAL LOW (ref 34.5–46.5)
HCT VFR BLDA CALC: 20 % — CRITICAL LOW (ref 34.5–46.5)
HGB BLD CALC-MCNC: 6.7 G/DL — CRITICAL LOW (ref 11.7–16.1)
HGB BLD CALC-MCNC: 6.7 G/DL — CRITICAL LOW (ref 11.7–16.1)
HGB BLD-MCNC: 6.4 G/DL — CRITICAL LOW (ref 11.5–15.5)
HGB BLD-MCNC: 6.4 G/DL — CRITICAL LOW (ref 11.5–15.5)
HGB BLD-MCNC: 6.5 G/DL — CRITICAL LOW (ref 11.5–15.5)
HGB BLD-MCNC: 6.5 G/DL — CRITICAL LOW (ref 11.5–15.5)
HGB BLD-MCNC: 6.6 G/DL — CRITICAL LOW (ref 11.5–15.5)
HGB BLD-MCNC: 6.6 G/DL — CRITICAL LOW (ref 11.5–15.5)
INR BLD: 1.15 RATIO — SIGNIFICANT CHANGE UP (ref 0.85–1.18)
INR BLD: 1.15 RATIO — SIGNIFICANT CHANGE UP (ref 0.85–1.18)
LACTATE BLDV-MCNC: 1.3 MMOL/L — SIGNIFICANT CHANGE UP (ref 0.5–2)
LACTATE BLDV-MCNC: 1.3 MMOL/L — SIGNIFICANT CHANGE UP (ref 0.5–2)
LYMPHOCYTES # BLD AUTO: 1.16 K/UL — SIGNIFICANT CHANGE UP (ref 1–3.3)
LYMPHOCYTES # BLD AUTO: 1.16 K/UL — SIGNIFICANT CHANGE UP (ref 1–3.3)
LYMPHOCYTES # BLD AUTO: 19.1 % — SIGNIFICANT CHANGE UP (ref 13–44)
LYMPHOCYTES # BLD AUTO: 19.1 % — SIGNIFICANT CHANGE UP (ref 13–44)
MACROCYTES BLD QL: SLIGHT — SIGNIFICANT CHANGE UP
MACROCYTES BLD QL: SLIGHT — SIGNIFICANT CHANGE UP
MANUAL SMEAR VERIFICATION: SIGNIFICANT CHANGE UP
MANUAL SMEAR VERIFICATION: SIGNIFICANT CHANGE UP
MCHC RBC-ENTMCNC: 30.6 GM/DL — LOW (ref 32–36)
MCHC RBC-ENTMCNC: 30.6 GM/DL — LOW (ref 32–36)
MCHC RBC-ENTMCNC: 30.7 PG — SIGNIFICANT CHANGE UP (ref 27–34)
MCHC RBC-ENTMCNC: 30.7 PG — SIGNIFICANT CHANGE UP (ref 27–34)
MCHC RBC-ENTMCNC: 30.8 PG — SIGNIFICANT CHANGE UP (ref 27–34)
MCHC RBC-ENTMCNC: 30.8 PG — SIGNIFICANT CHANGE UP (ref 27–34)
MCHC RBC-ENTMCNC: 30.9 PG — SIGNIFICANT CHANGE UP (ref 27–34)
MCHC RBC-ENTMCNC: 30.9 PG — SIGNIFICANT CHANGE UP (ref 27–34)
MCHC RBC-ENTMCNC: 31.6 GM/DL — LOW (ref 32–36)
MCHC RBC-ENTMCNC: 31.6 GM/DL — LOW (ref 32–36)
MCHC RBC-ENTMCNC: 31.7 GM/DL — LOW (ref 32–36)
MCHC RBC-ENTMCNC: 31.7 GM/DL — LOW (ref 32–36)
MCV RBC AUTO: 101 FL — HIGH (ref 80–100)
MCV RBC AUTO: 101 FL — HIGH (ref 80–100)
MCV RBC AUTO: 97.2 FL — SIGNIFICANT CHANGE UP (ref 80–100)
METAMYELOCYTES # FLD: 2.7 % — HIGH (ref 0–0)
METAMYELOCYTES # FLD: 2.7 % — HIGH (ref 0–0)
MONOCYTES # BLD AUTO: 0.16 K/UL — SIGNIFICANT CHANGE UP (ref 0–0.9)
MONOCYTES # BLD AUTO: 0.16 K/UL — SIGNIFICANT CHANGE UP (ref 0–0.9)
MONOCYTES NFR BLD AUTO: 2.7 % — SIGNIFICANT CHANGE UP (ref 2–14)
MONOCYTES NFR BLD AUTO: 2.7 % — SIGNIFICANT CHANGE UP (ref 2–14)
MYELOCYTES NFR BLD: 0.9 % — HIGH (ref 0–0)
MYELOCYTES NFR BLD: 0.9 % — HIGH (ref 0–0)
NEUTROPHILS # BLD AUTO: 4.14 K/UL — SIGNIFICANT CHANGE UP (ref 1.8–7.4)
NEUTROPHILS # BLD AUTO: 4.14 K/UL — SIGNIFICANT CHANGE UP (ref 1.8–7.4)
NEUTROPHILS NFR BLD AUTO: 62.7 % — SIGNIFICANT CHANGE UP (ref 43–77)
NEUTROPHILS NFR BLD AUTO: 62.7 % — SIGNIFICANT CHANGE UP (ref 43–77)
NEUTS BAND # BLD: 5.5 % — SIGNIFICANT CHANGE UP (ref 0–8)
NEUTS BAND # BLD: 5.5 % — SIGNIFICANT CHANGE UP (ref 0–8)
NRBC # BLD: 2 /100 WBCS — HIGH (ref 0–0)
NRBC # BLD: 2 /100 — HIGH (ref 0–0)
NRBC # BLD: 2 /100 — HIGH (ref 0–0)
OVALOCYTES BLD QL SMEAR: SLIGHT — SIGNIFICANT CHANGE UP
OVALOCYTES BLD QL SMEAR: SLIGHT — SIGNIFICANT CHANGE UP
PCO2 BLDV: 43 MMHG — HIGH (ref 39–42)
PCO2 BLDV: 43 MMHG — HIGH (ref 39–42)
PH BLDV: 7.45 — HIGH (ref 7.32–7.43)
PH BLDV: 7.45 — HIGH (ref 7.32–7.43)
PLAT MORPH BLD: NORMAL — SIGNIFICANT CHANGE UP
PLAT MORPH BLD: NORMAL — SIGNIFICANT CHANGE UP
PLATELET # BLD AUTO: 106 K/UL — LOW (ref 150–400)
PLATELET # BLD AUTO: 106 K/UL — LOW (ref 150–400)
PLATELET # BLD AUTO: 86 K/UL — LOW (ref 150–400)
PLATELET # BLD AUTO: 86 K/UL — LOW (ref 150–400)
PLATELET # BLD AUTO: 93 K/UL — LOW (ref 150–400)
PLATELET # BLD AUTO: 93 K/UL — LOW (ref 150–400)
PO2 BLDV: 53 MMHG — HIGH (ref 25–45)
PO2 BLDV: 53 MMHG — HIGH (ref 25–45)
POIKILOCYTOSIS BLD QL AUTO: SIGNIFICANT CHANGE UP
POIKILOCYTOSIS BLD QL AUTO: SIGNIFICANT CHANGE UP
POTASSIUM BLDV-SCNC: 4.1 MMOL/L — SIGNIFICANT CHANGE UP (ref 3.5–5.1)
POTASSIUM BLDV-SCNC: 4.1 MMOL/L — SIGNIFICANT CHANGE UP (ref 3.5–5.1)
POTASSIUM SERPL-MCNC: 4 MMOL/L — SIGNIFICANT CHANGE UP (ref 3.5–5.3)
POTASSIUM SERPL-MCNC: 4 MMOL/L — SIGNIFICANT CHANGE UP (ref 3.5–5.3)
POTASSIUM SERPL-SCNC: 4 MMOL/L — SIGNIFICANT CHANGE UP (ref 3.5–5.3)
POTASSIUM SERPL-SCNC: 4 MMOL/L — SIGNIFICANT CHANGE UP (ref 3.5–5.3)
PROT SERPL-MCNC: 7.8 G/DL — SIGNIFICANT CHANGE UP (ref 6–8.3)
PROT SERPL-MCNC: 7.8 G/DL — SIGNIFICANT CHANGE UP (ref 6–8.3)
PROTHROM AB SERPL-ACNC: 12 SEC — SIGNIFICANT CHANGE UP (ref 9.5–13)
PROTHROM AB SERPL-ACNC: 12 SEC — SIGNIFICANT CHANGE UP (ref 9.5–13)
RBC # BLD: 2.07 M/UL — LOW (ref 3.8–5.2)
RBC # BLD: 2.07 M/UL — LOW (ref 3.8–5.2)
RBC # BLD: 2.11 M/UL — LOW (ref 3.8–5.2)
RBC # BLD: 2.11 M/UL — LOW (ref 3.8–5.2)
RBC # BLD: 2.15 M/UL — LOW (ref 3.8–5.2)
RBC # BLD: 2.15 M/UL — LOW (ref 3.8–5.2)
RBC # FLD: 19.3 % — HIGH (ref 10.3–14.5)
RBC # FLD: 19.3 % — HIGH (ref 10.3–14.5)
RBC # FLD: 19.8 % — HIGH (ref 10.3–14.5)
RBC # FLD: 19.8 % — HIGH (ref 10.3–14.5)
RBC # FLD: 20 % — HIGH (ref 10.3–14.5)
RBC # FLD: 20 % — HIGH (ref 10.3–14.5)
RBC BLD AUTO: ABNORMAL
RBC BLD AUTO: ABNORMAL
RSV RNA NPH QL NAA+NON-PROBE: SIGNIFICANT CHANGE UP
RSV RNA NPH QL NAA+NON-PROBE: SIGNIFICANT CHANGE UP
SAO2 % BLDV: 77.1 % — SIGNIFICANT CHANGE UP (ref 67–88)
SAO2 % BLDV: 77.1 % — SIGNIFICANT CHANGE UP (ref 67–88)
SARS-COV-2 RNA SPEC QL NAA+PROBE: SIGNIFICANT CHANGE UP
SARS-COV-2 RNA SPEC QL NAA+PROBE: SIGNIFICANT CHANGE UP
SCHISTOCYTES BLD QL AUTO: SLIGHT — SIGNIFICANT CHANGE UP
SCHISTOCYTES BLD QL AUTO: SLIGHT — SIGNIFICANT CHANGE UP
SMUDGE CELLS # BLD: PRESENT — SIGNIFICANT CHANGE UP
SMUDGE CELLS # BLD: PRESENT — SIGNIFICANT CHANGE UP
SODIUM SERPL-SCNC: 138 MMOL/L — SIGNIFICANT CHANGE UP (ref 135–145)
SODIUM SERPL-SCNC: 138 MMOL/L — SIGNIFICANT CHANGE UP (ref 135–145)
WBC # BLD: 4.35 K/UL — SIGNIFICANT CHANGE UP (ref 3.8–10.5)
WBC # BLD: 4.35 K/UL — SIGNIFICANT CHANGE UP (ref 3.8–10.5)
WBC # BLD: 4.46 K/UL — SIGNIFICANT CHANGE UP (ref 3.8–10.5)
WBC # BLD: 4.46 K/UL — SIGNIFICANT CHANGE UP (ref 3.8–10.5)
WBC # BLD: 6.07 K/UL — SIGNIFICANT CHANGE UP (ref 3.8–10.5)
WBC # BLD: 6.07 K/UL — SIGNIFICANT CHANGE UP (ref 3.8–10.5)
WBC # FLD AUTO: 4.35 K/UL — SIGNIFICANT CHANGE UP (ref 3.8–10.5)
WBC # FLD AUTO: 4.35 K/UL — SIGNIFICANT CHANGE UP (ref 3.8–10.5)
WBC # FLD AUTO: 4.46 K/UL — SIGNIFICANT CHANGE UP (ref 3.8–10.5)
WBC # FLD AUTO: 4.46 K/UL — SIGNIFICANT CHANGE UP (ref 3.8–10.5)
WBC # FLD AUTO: 6.07 K/UL — SIGNIFICANT CHANGE UP (ref 3.8–10.5)
WBC # FLD AUTO: 6.07 K/UL — SIGNIFICANT CHANGE UP (ref 3.8–10.5)

## 2023-11-26 PROCEDURE — 71046 X-RAY EXAM CHEST 2 VIEWS: CPT | Mod: 26

## 2023-11-26 PROCEDURE — 99285 EMERGENCY DEPT VISIT HI MDM: CPT

## 2023-11-26 RX ORDER — INFLUENZA VIRUS VACCINE 15; 15; 15; 15 UG/.5ML; UG/.5ML; UG/.5ML; UG/.5ML
0.5 SUSPENSION INTRAMUSCULAR ONCE
Refills: 0 | Status: DISCONTINUED | OUTPATIENT
Start: 2023-11-26 | End: 2023-11-29

## 2023-11-26 RX ORDER — FAMOTIDINE 10 MG/ML
20 INJECTION INTRAVENOUS DAILY
Refills: 0 | Status: DISCONTINUED | OUTPATIENT
Start: 2023-11-26 | End: 2023-11-29

## 2023-11-26 RX ORDER — CALCIUM ACETATE 667 MG
667 TABLET ORAL
Refills: 0 | Status: DISCONTINUED | OUTPATIENT
Start: 2023-11-26 | End: 2023-11-29

## 2023-11-26 RX ORDER — ONDANSETRON 8 MG/1
4 TABLET, FILM COATED ORAL ONCE
Refills: 0 | Status: COMPLETED | OUTPATIENT
Start: 2023-11-26 | End: 2023-11-26

## 2023-11-26 RX ADMIN — ONDANSETRON 4 MILLIGRAM(S): 8 TABLET, FILM COATED ORAL at 14:30

## 2023-11-26 RX ADMIN — Medication 100 MILLIGRAM(S): at 17:50

## 2023-11-26 RX ADMIN — Medication 600 MILLIGRAM(S): at 20:22

## 2023-11-26 RX ADMIN — Medication 100 MILLIGRAM(S): at 23:44

## 2023-11-26 RX ADMIN — Medication 667 MILLIGRAM(S): at 21:12

## 2023-11-26 NOTE — H&P ADULT - ASSESSMENT
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     Symptomatic Anemia from PCV/ CKD     Transfuse prn   Renal eval appreciated   Hem eval   Patient  on Jakovi 15mg tid/weekly-10-back to 20 mg   Dose adjustments as per Hem         HTN Controlled   Not on meds       ESRD on HD   HD as per Renal

## 2023-11-26 NOTE — CONSULT NOTE ADULT - SUBJECTIVE AND OBJECTIVE BOX
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 Nephrology service consulted for ESRD management. Patient states she had her blood work done 1 week ago and her results returned 2 days ago with hgb 6.9. However, at that time she was feeling well so did not go to the hospital. When she woke up this morning, she developed dizziness, nausea, vomiting, and shortness of breath, thus prompting her ED visit. She also endorses uri over the past 2 weeks with productive cough and rhinorrhea. Denies fever, chills, hematuria, hematemesis, hemoptysis, melena, or bloody stools. She recently had blood transfusion on 10/16/23. Hgb this admission 6.4. Labs otherwise unremarkable given her hx. Last outpatient HD was on 11/24, which she tolerated well. CXR showed atelectasis.    Nephro - Dr. Genet Quintana - Dr. Jacky Guo    PAST HISTORY  --------------------------------------------------------------------------------  PAST MEDICAL & SURGICAL HISTORY:  ESRD on dialysis  Anemia secondary to renal failure  HTN    FAMILY HISTORY:    PAST SOCIAL HISTORY:    ALLERGIES & MEDICATIONS  --------------------------------------------------------------------------------  Allergies    No Known Allergies      Standing Inpatient Medications    PRN Inpatient Medications      REVIEW OF SYSTEMS  --------------------------------------------------------------------------------  Constitutional: No fevers/chills  HEENT: No HA, sore throat   Respiratory: No dyspnea, +cough  Cardiovascular: No chest pain  Gastrointestinal: No abdominal pain, diarrhea, +nausea, no vomiting  Genitourinary: No dysuria, hematuria, urgency  Extremities: No edema  Skin: No rashes  Heme: No easy bruising or bleeding    All other systems were reviewed and are negative, except as noted.    VITALS  --------------------------------------------------------------------------------  T(C): 36.7 (11-26-23 @ 18:05), Max: 37.1 (11-26-23 @ 12:45)  HR: 80 (11-26-23 @ 18:05) (80 - 87)  BP: 101/82 (11-26-23 @ 18:05) (101/82 - 113/72)  RR: 14 (11-26-23 @ 18:05) (14 - 20)  SpO2: 100% (11-26-23 @ 18:05) (94% - 100%)  Wt(kg): --  Height (cm): 152.4 (11-26-23 @ 12:45)  Weight (kg): 57 (11-26-23 @ 12:45)  BMI (kg/m2): 24.5 (11-26-23 @ 12:45)  BSA (m2): 1.53 (11-26-23 @ 12:45)    PHYSICAL EXAM:  General: no acute distress  Neuro: no focal deficits  HEENT: NC/AT, anicteric, no JVD  Pulmonary: lungs CTA B/L  Cardiovascular/Chest: +S1S2, RRR  GI/Abdomen: soft, NT/ND, +bowel sounds        Transplant site: N/A  Extremities: No edema  : topete  Skin: Warm and dry  Vascular access:    LABS/STUDIES  --------------------------------------------------------------------------------              6.4    6.07  >-----------<  106      [11-26-23 @ 14:19]              20.9     138  |  96  |  34  ----------------------------<  143      [11-26-23 @ 14:19]  4.0   |  25  |  8.01        Ca     9.1     [11-26-23 @ 14:19]    TPro  7.8  /  Alb  4.3  /  TBili  1.2  /  DBili  x   /  AST  15  /  ALT  14  /  AlkPhos  77  [11-26-23 @ 14:19]    PT/INR: PT 12.0 , INR 1.15       [11-26-23 @ 14:19]  PTT: 28.4       [11-26-23 @ 14:19]      Creatinine Trend:  SCr 8.01 [11-26 @ 14:19]    Urinalysis - [11-26-23 @ 14:19]      Color  / Appearance  / SG  / pH       Gluc 143 / Ketone   / Bili  / Urobili        Blood  / Protein  / Leuk Est  / Nitrite       RBC  / WBC  / Hyaline  / Gran  / Sq Epi  / Non Sq Epi  / Bacteria           Tacrolimus  Cyclosporine  Sirolimus  Mycophenolate  BK PCR  CMV PCR  Parvo PCR  EBV PCR [facility] DIVISION OF KIDNEY DISEASES AND HYPERTENSION -- [phone]  -- INITIAL CONSULT NOTE  --------------------------------------------------------------------------------  HPI: 63 year old female with hx of Polycythemia Vera, MAK 2 + since 2012, splenic vein thrombosis (2015), ESRD on HD (M/W/F) via LUE AVF (2/2 chronic GN, 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 Nephrology service consulted for ESRD management. Patient states she had her blood work done 1 week ago and her results returned 2 days ago with hgb 6.9. However, at that time she was feeling well so did not go to the hospital. When she woke up this morning, she developed dizziness, nausea, vomiting, and shortness of breath, thus prompting her ED visit. She also endorses uri over the past 2 weeks with productive cough and rhinorrhea. Denies fever, chills, hematuria, hematemesis, hemoptysis, melena, or bloody stools. She recently had blood transfusion on 10/16/23. Hgb this admission 6.4. Labs otherwise unremarkable given her hx. Last outpatient HD was on 11/24, which she tolerated well. CXR showed atelectasis.    Nephro - Dr. Genet Quintana - Dr. Jacky Guo    PAST HISTORY  --------------------------------------------------------------------------------  PAST MEDICAL & SURGICAL HISTORY:  ESRD on dialysis  Anemia secondary to renal failure  HTN    FAMILY HISTORY:    PAST SOCIAL HISTORY:    ALLERGIES & MEDICATIONS  --------------------------------------------------------------------------------  Allergies    No Known Allergies      Standing Inpatient Medications    PRN Inpatient Medications      REVIEW OF SYSTEMS  --------------------------------------------------------------------------------  Constitutional: No fevers/chills  HEENT: No HA, sore throat   Respiratory: No dyspnea, +cough  Cardiovascular: No chest pain  Gastrointestinal: No abdominal pain, diarrhea, +nausea, no vomiting  Genitourinary: No dysuria, hematuria, urgency  Extremities: No edema  Skin: No rashes  Heme: No easy bruising or bleeding    All other systems were reviewed and are negative, except as noted.    VITALS  --------------------------------------------------------------------------------  T(C): 36.7 (11-26-23 @ 18:05), Max: 37.1 (11-26-23 @ 12:45)  HR: 80 (11-26-23 @ 18:05) (80 - 87)  BP: 101/82 (11-26-23 @ 18:05) (101/82 - 113/72)  RR: 14 (11-26-23 @ 18:05) (14 - 20)  SpO2: 100% (11-26-23 @ 18:05) (94% - 100%)  Wt(kg): --  Height (cm): 152.4 (11-26-23 @ 12:45)  Weight (kg): 57 (11-26-23 @ 12:45)  BMI (kg/m2): 24.5 (11-26-23 @ 12:45)  BSA (m2): 1.53 (11-26-23 @ 12:45)    PHYSICAL EXAM:  General: no acute distress  Neuro: no focal deficits  HEENT: NC/AT, anicteric, no JVD  Pulmonary: lungs CTA B/L  Cardiovascular/Chest: +S1S2, RRR  GI/Abdomen: soft, NT/ND, +bowel sounds        Transplant site: N/A  Extremities: No edema  : topete  Skin: Warm and dry  Vascular access:    LABS/STUDIES  --------------------------------------------------------------------------------              6.4    6.07  >-----------<  106      [11-26-23 @ 14:19]              20.9     138  |  96  |  34  ----------------------------<  143      [11-26-23 @ 14:19]  4.0   |  25  |  8.01        Ca     9.1     [11-26-23 @ 14:19]    TPro  7.8  /  Alb  4.3  /  TBili  1.2  /  DBili  x   /  AST  15  /  ALT  14  /  AlkPhos  77  [11-26-23 @ 14:19]    PT/INR: PT 12.0 , INR 1.15       [11-26-23 @ 14:19]  PTT: 28.4       [11-26-23 @ 14:19]      Creatinine Trend:  SCr 8.01 [11-26 @ 14:19]    Urinalysis - [11-26-23 @ 14:19]      Color  / Appearance  / SG  / pH       Gluc 143 / Ketone   / Bili  / Urobili        Blood  / Protein  / Leuk Est  / Nitrite       RBC  / WBC  / Hyaline  / Gran  / Sq Epi  / Non Sq Epi  / Bacteria           Tacrolimus  Cyclosporine  Sirolimus  Mycophenolate  BK PCR  CMV PCR  Parvo PCR  EBV PCR [facility] DIVISION OF KIDNEY DISEASES AND HYPERTENSION -- [phone]  -- INITIAL CONSULT NOTE  --------------------------------------------------------------------------------  HPI: 63 year old female with hx of Polycythemia Vera, MAK 2 + since 2012, splenic vein thrombosis (2015), ESRD on HD (M/W/F) via LUE AVF (2/2 chronic GN, 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 Nephrology service consulted for ESRD management. Patient states she had her blood work done 1 week ago and her results returned 2 days ago with hgb 6.9. However, at that time she was feeling well so did not go to the hospital. When she woke up this morning, she developed dizziness, nausea, vomiting, and shortness of breath, thus prompting her ED visit. She also endorses uri over the past 2 weeks with productive cough and rhinorrhea. Denies fever, chills, hematuria, hematemesis, hemoptysis, melena, or bloody stools. She recently had blood transfusion on 10/16/23. Hgb this admission 6.4. Labs otherwise unremarkable given her hx. Last outpatient HD was on 11/24, which she tolerated well. CXR showed atelectasis.    Nephro - Dr. Genet Quintana - Dr. Jacky Guo    PAST HISTORY  --------------------------------------------------------------------------------  PAST MEDICAL & SURGICAL HISTORY:  ESRD on dialysis  Anemia secondary to renal failure  HTN    FAMILY HISTORY:    PAST SOCIAL HISTORY:    ALLERGIES & MEDICATIONS  --------------------------------------------------------------------------------  Allergies    No Known Allergies      Standing Inpatient Medications    PRN Inpatient Medications      REVIEW OF SYSTEMS  --------------------------------------------------------------------------------  Constitutional: No fevers/chills  HEENT: No HA, sore throat   Respiratory: No dyspnea, +cough  Cardiovascular: No chest pain  Gastrointestinal: No abdominal pain, diarrhea, +nausea, no vomiting  Genitourinary: No dysuria, hematuria, urgency  Extremities: No edema  Skin: No rashes  Heme: No easy bruising or bleeding    All other systems were reviewed and are negative, except as noted.    VITALS  --------------------------------------------------------------------------------  T(C): 36.7 (11-26-23 @ 18:05), Max: 37.1 (11-26-23 @ 12:45)  HR: 80 (11-26-23 @ 18:05) (80 - 87)  BP: 101/82 (11-26-23 @ 18:05) (101/82 - 113/72)  RR: 14 (11-26-23 @ 18:05) (14 - 20)  SpO2: 100% (11-26-23 @ 18:05) (94% - 100%)  Wt(kg): --  Height (cm): 152.4 (11-26-23 @ 12:45)  Weight (kg): 57 (11-26-23 @ 12:45)  BMI (kg/m2): 24.5 (11-26-23 @ 12:45)  BSA (m2): 1.53 (11-26-23 @ 12:45)    PHYSICAL EXAM:  General: no acute distress  Neuro: no focal deficits  HEENT: NC/AT, anicteric, no JVD  Pulmonary: lungs CTA B/L  Cardiovascular/Chest: +S1S2, RRR  GI/Abdomen: soft, NT/ND, +bowel sounds        Transplant site: N/A  Extremities: No edema  : topete  Skin: Warm and dry  Vascular access:    LABS/STUDIES  --------------------------------------------------------------------------------              6.4    6.07  >-----------<  106      [11-26-23 @ 14:19]              20.9     138  |  96  |  34  ----------------------------<  143      [11-26-23 @ 14:19]  4.0   |  25  |  8.01        Ca     9.1     [11-26-23 @ 14:19]    TPro  7.8  /  Alb  4.3  /  TBili  1.2  /  DBili  x   /  AST  15  /  ALT  14  /  AlkPhos  77  [11-26-23 @ 14:19]    PT/INR: PT 12.0 , INR 1.15       [11-26-23 @ 14:19]  PTT: 28.4       [11-26-23 @ 14:19]      Creatinine Trend:  SCr 8.01 [11-26 @ 14:19]    Urinalysis - [11-26-23 @ 14:19]      Color  / Appearance  / SG  / pH       Gluc 143 / Ketone   / Bili  / Urobili        Blood  / Protein  / Leuk Est  / Nitrite       RBC  / WBC  / Hyaline  / Gran  / Sq Epi  / Non Sq Epi  / Bacteria           Tacrolimus  Cyclosporine  Sirolimus  Mycophenolate  BK PCR  CMV PCR  Parvo PCR  EBV PCR

## 2023-11-26 NOTE — ED ADULT NURSE REASSESSMENT NOTE - NS ED NURSE REASSESS COMMENT FT1
patient resting in bed comfortably, PRBC started patient instructed on risks and benefits of transfusion. 2 rn at bedside for initiation. patient complains of cough, medication given. VSS, patient updated on plan and care will continue to monitor .

## 2023-11-26 NOTE — H&P ADULT - HISTORY OF PRESENT ILLNESS
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   Patient  states that she was referred to ED for low Hemoglobin.   Patient had her blood work earlier this week, got the results 2 days ago and noted to be dizzy, weak and naiuseaus this morning. No hx chest pain/sob.   Patient takes Jakofi and Danazol for Polycythemia as per her Hematologist and the dose was increased recently.     No hx EGD/ Colonoscopy

## 2023-11-26 NOTE — ED ADULT NURSE NOTE - OBJECTIVE STATEMENT
patient arrived to ed via walk in accompanied by . C/O low H/H after dialysis on friday. was advised to come to ED but didn't States "I felt fine, but this morning felt dizzy". PMH CKD on dialysis MWF, anemia, mediport on right patient arrived to ed via walk in accompanied by . C/O low H/H after dialysis on friday. was advised to come to ED but didn't States "I felt fine, but this morning felt dizzy". PMH CKD on dialysis MWF, anemia, medi port on right chest, left av fistula. On assessment patient is aaox4, VSS, patient presents with a cough. patient informed of care and updated on plan. will continue to assess.

## 2023-11-26 NOTE — ED PROVIDER NOTE - PROGRESS NOTE DETAILS
Lila Ahuja DO (PGY-1): Hgb 6.4. Will obtain consent for transfusion and page nephrology. Lila Ahuja DO (PGY-1): Attempted to reach house nephrology twice and hematology with no answer. Will transfuse 1 unit and admit to medicine.

## 2023-11-26 NOTE — ED PROVIDER NOTE - CLINICAL SUMMARY MEDICAL DECISION MAKING FREE TEXT BOX
63 year old female with hx of ESRD, on HD M/W/F, here today for drop in hgb to 6.9 on outpatient labs last week. Presenting with dizziness, shortness of breath, nausea, and vomiting. No acute hemorrhage. Will check cbc, cmp, viral panel, cxr, ecg, nephro consult. Likely admit for transfusion and dialysis in AM. 63 year old female with hx of ESRD, on HD M/W/F, here today for drop in hgb to 6.9 on outpatient labs last week. Presenting with dizziness, shortness of breath, nausea, and vomiting. No acute hemorrhage. Will check cbc, cmp, viral panel, cxr, ecg, nephro consult. Likely admit for transfusion and dialysis in AM. ZR

## 2023-11-26 NOTE — ED ADULT TRIAGE NOTE - CHIEF COMPLAINT QUOTE
Hgb: 6.9 (hx ESRD, last HD: friday, last txfusion x 3 wks ago), also c/o URI, HA, dizzy, nausea, denies SOB/diff breathing/denies chest pain

## 2023-11-26 NOTE — CONSULT NOTE ADULT - ASSESSMENT
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 Nephrology service consulted for ESRD management. Hgb this admission 6.4. Labs otherwise unremarkable given her hx. Last outpatient HD was on 11/24, which she tolerated well. CXR showed atelectasis. Dry weight is 57-58kg per pt and she urinates about a cup a day.    ESRD on HD MWF:  Will schedule for HD on 11/27 as per her normal schedule. Will order for 1L UF if BP tolerates as she is getting 1u PRBC today. However, she reported she recently stopped a BP med because BP at end of HD started to become a bit soft so would adjust UF to maintain SBP>100 mmHg  Consent obtained.    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 Check daily renal panel. If phos and Ca not at goal, can check PTH and vitamin D levels but she is followed closely by Dr. De León and anticipate she will not be hospitalized very long.    Anemia iso ESRD and PV:  Per outpt heme/onc on 8/2023: "Jakafi was initially started at 15mg TIW. Dose was decreased to 10mg TIW due to anemia though that was likely form the lack of danazol. 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 Would continue this.  Since then patient continued to have some abdominal complaints. though HG did increase. Danazol was increased to 600mg and Jakafi increased back up to 15mg 3 times a week last October. Given more recent anemia on and off, we had not been sure which direction to go in. Aside from transfusion requirements, patient feeling well over the past 3 months on a 20mg 3 times a week dose of Jakafi. Given patient still requiring transfusion, can attempt to lower the dose back to 10mg again to see if this helps with the anemia. Still unclear whether the current anemia is from progression of disease versus the side effect of the Jakafi."  - Consider inpatient heme/onc paul Tse, DO  Nephrology Fellow  Feel free to contact me directly on TEAMS with any questions.  (After 5pm or on weekends, please call the on-call fellow).

## 2023-11-26 NOTE — ED PROVIDER NOTE - NS ED ROS FT
CONSTITUTIONAL: No fevers, no chills, no lightheadedness, + dizziness  EYES: no visual changes, no eye pain  EARS: no ear drainage, no ear pain, no change in hearing  NOSE: + nasal congestion  MOUTH/THROAT: no sore throat  CV: No chest pain, no palpitations  RESP: + SOB, + cough  GI: + nausea, + vomiting, no abd pain  : no dysuria, no hematuria, no flank pain  MSK: no back pain, no extremity pain  SKIN: no rashes  NEURO: no headache, no focal weakness, no decreased sensation/parasthesias

## 2023-11-26 NOTE — ED PROVIDER NOTE - PHYSICAL EXAMINATION
GEN: NAD, awake, eyes open spontaneously  EYES: normal conjunctiva, perrl  ENT: NCAT, MMM, Trachea midline  CHEST/LUNGS: Non-tachypneic, bilateral coarse breath sounds  CARDIAC: Non-tachycardic, normal perfusion  ABDOMEN: Soft, NTND, No rebound/guarding  MSK: Failed fistula LUE. No edema, no gross deformity of extremities  SKIN: No rashes, no petechiae, no vesicles  NEURO: CN grossly intact, normal coordination, no focal motor or sensory deficits

## 2023-11-26 NOTE — PATIENT PROFILE ADULT - FALL HARM RISK - HARM RISK INTERVENTIONS
Assistance with ambulation/Assistance OOB with selected safe patient handling equipment/Communicate Risk of Fall with Harm to all staff/Monitor gait and stability/Reinforce activity limits and safety measures with patient and family/Sit up slowly, dangle for a short time, stand at bedside before walking/Tailored Fall Risk Interventions/Visual Cue: Yellow wristband and red socks/Bed in lowest position, wheels locked, appropriate side rails in place/Call bell, personal items and telephone in reach/Instruct patient to call for assistance before getting out of bed or chair/Non-slip footwear when patient is out of bed/[place] to call system/Physically safe environment - no spills, clutter or unnecessary equipment/Purposeful Proactive Rounding/Room/bathroom lighting operational, light cord in reach

## 2023-11-26 NOTE — ED ADULT NURSE NOTE - NSFALLUNIVINTERV_ED_ALL_ED
Bed/Stretcher in lowest position, wheels locked, appropriate side rails in place/Call bell, personal items and telephone in reach/Instruct patient to call for assistance before getting out of bed/chair/stretcher/Non-slip footwear applied when patient is off stretcher/[place] to call system/Physically safe environment - no spills, clutter or unnecessary equipment/Purposeful proactive rounding/Room/bathroom lighting operational, light cord in reach Move patient closer to nursing station/within visual sight of ED staff/Bed/Stretcher in lowest position, wheels locked, appropriate side rails in place/Call bell, personal items and telephone in reach/Instruct patient to call for assistance before getting out of bed/chair/stretcher/Non-slip footwear applied when patient is off stretcher/[place] to call system/Physically safe environment - no spills, clutter or unnecessary equipment/Purposeful proactive rounding/Room/bathroom lighting operational, light cord in reach

## 2023-11-26 NOTE — ED PROVIDER NOTE - OBJECTIVE STATEMENT
63 year old female with hx of polycythemia, ESRD, on HD M/W/F, presents to the ED for abnormal lab result. Patient states she had her blood work done 1 week ago and her results returned 2 days ago with hgb 6.9. However, at that time she was feeling well so did not go to the hospital. When she woke up this morning, she developed dizziness, nausea, vomiting, and shortness of breath, thus prompting her ED visit. She also endorses uri over the past 2 weeks with cough and rhinorrhea. Denies fever, chills, hematuria, hematemesis, hemoptysis, melena, or bloody stools. She recently had blood transfusion on 10/16/23.     Nephro - Dr. Guo 63 year old female with hx of polycythemia, ESRD, on HD M/W/F, presents to the ED for abnormal lab result. Patient states she had her blood work done 1 week ago and her results returned 2 days ago with hgb 6.9. However, at that time she was feeling well so did not go to the hospital. When she woke up this morning, she developed dizziness, nausea, vomiting, and shortness of breath, thus prompting her ED visit. She also endorses uri over the past 2 weeks with cough and rhinorrhea. Denies fever, chills, hematuria, hematemesis, hemoptysis, melena, or bloody stools. She recently had blood transfusion on 10/16/23.     Nephro - Dr. De eLón 63 year old female with hx of polycythemia, ESRD, on HD M/W/F, presents to the ED for abnormal lab result. Patient states she had her blood work done 1 week ago and her results returned 2 days ago with hgb 6.9. However, at that time she was feeling well so did not go to the hospital. When she woke up this morning, she developed dizziness, nausea, vomiting, and shortness of breath, thus prompting her ED visit. She also endorses uri over the past 2 weeks with cough and rhinorrhea. Denies fever, chills, hematuria, hematemesis, hemoptysis, melena, or bloody stools. She recently had blood transfusion on 10/16/23.     Nephro - Dr. De León  Heme - Dr. Jacky Guo

## 2023-11-27 DIAGNOSIS — N18.6 END STAGE RENAL DISEASE: ICD-10-CM

## 2023-11-27 DIAGNOSIS — D64.9 ANEMIA, UNSPECIFIED: ICD-10-CM

## 2023-11-27 LAB
ANION GAP SERPL CALC-SCNC: 15 MMOL/L — SIGNIFICANT CHANGE UP (ref 5–17)
ANION GAP SERPL CALC-SCNC: 15 MMOL/L — SIGNIFICANT CHANGE UP (ref 5–17)
BUN SERPL-MCNC: 43 MG/DL — HIGH (ref 7–23)
BUN SERPL-MCNC: 43 MG/DL — HIGH (ref 7–23)
CALCIUM SERPL-MCNC: 8.7 MG/DL — SIGNIFICANT CHANGE UP (ref 8.4–10.5)
CALCIUM SERPL-MCNC: 8.7 MG/DL — SIGNIFICANT CHANGE UP (ref 8.4–10.5)
CHLORIDE SERPL-SCNC: 99 MMOL/L — SIGNIFICANT CHANGE UP (ref 96–108)
CHLORIDE SERPL-SCNC: 99 MMOL/L — SIGNIFICANT CHANGE UP (ref 96–108)
CO2 SERPL-SCNC: 24 MMOL/L — SIGNIFICANT CHANGE UP (ref 22–31)
CO2 SERPL-SCNC: 24 MMOL/L — SIGNIFICANT CHANGE UP (ref 22–31)
CREAT SERPL-MCNC: 9.01 MG/DL — HIGH (ref 0.5–1.3)
CREAT SERPL-MCNC: 9.01 MG/DL — HIGH (ref 0.5–1.3)
EGFR: 5 ML/MIN/1.73M2 — LOW
EGFR: 5 ML/MIN/1.73M2 — LOW
FERRITIN SERPL-MCNC: 1726 NG/ML — HIGH (ref 13–330)
FERRITIN SERPL-MCNC: 1726 NG/ML — HIGH (ref 13–330)
FOLATE SERPL-MCNC: >20 NG/ML — SIGNIFICANT CHANGE UP
FOLATE SERPL-MCNC: >20 NG/ML — SIGNIFICANT CHANGE UP
GLUCOSE SERPL-MCNC: 165 MG/DL — HIGH (ref 70–99)
GLUCOSE SERPL-MCNC: 165 MG/DL — HIGH (ref 70–99)
HAPTOGLOB SERPL-MCNC: 36 MG/DL — SIGNIFICANT CHANGE UP (ref 34–200)
HAPTOGLOB SERPL-MCNC: 36 MG/DL — SIGNIFICANT CHANGE UP (ref 34–200)
HBV CORE AB SER-ACNC: SIGNIFICANT CHANGE UP
HBV CORE AB SER-ACNC: SIGNIFICANT CHANGE UP
HBV CORE IGM SER-ACNC: SIGNIFICANT CHANGE UP
HBV CORE IGM SER-ACNC: SIGNIFICANT CHANGE UP
HBV SURFACE AB SER-ACNC: 7 MIU/ML — LOW
HBV SURFACE AB SER-ACNC: 7 MIU/ML — LOW
HBV SURFACE AG SER-ACNC: SIGNIFICANT CHANGE UP
HBV SURFACE AG SER-ACNC: SIGNIFICANT CHANGE UP
HCT VFR BLD CALC: 20.6 % — CRITICAL LOW (ref 34.5–45)
HCT VFR BLD CALC: 20.6 % — CRITICAL LOW (ref 34.5–45)
HCV AB S/CO SERPL IA: 0.13 S/CO — SIGNIFICANT CHANGE UP (ref 0–0.99)
HCV AB S/CO SERPL IA: 0.13 S/CO — SIGNIFICANT CHANGE UP (ref 0–0.99)
HCV AB SERPL-IMP: SIGNIFICANT CHANGE UP
HCV AB SERPL-IMP: SIGNIFICANT CHANGE UP
HGB BLD-MCNC: 6.4 G/DL — CRITICAL LOW (ref 11.5–15.5)
HGB BLD-MCNC: 6.4 G/DL — CRITICAL LOW (ref 11.5–15.5)
IRON SATN MFR SERPL: 61 % — HIGH (ref 14–50)
IRON SATN MFR SERPL: 61 % — HIGH (ref 14–50)
IRON SATN MFR SERPL: 86 UG/DL — SIGNIFICANT CHANGE UP (ref 30–160)
IRON SATN MFR SERPL: 86 UG/DL — SIGNIFICANT CHANGE UP (ref 30–160)
LDH SERPL L TO P-CCNC: 323 U/L — HIGH (ref 50–242)
LDH SERPL L TO P-CCNC: 323 U/L — HIGH (ref 50–242)
MCHC RBC-ENTMCNC: 30.8 PG — SIGNIFICANT CHANGE UP (ref 27–34)
MCHC RBC-ENTMCNC: 30.8 PG — SIGNIFICANT CHANGE UP (ref 27–34)
MCHC RBC-ENTMCNC: 31.1 GM/DL — LOW (ref 32–36)
MCHC RBC-ENTMCNC: 31.1 GM/DL — LOW (ref 32–36)
MCV RBC AUTO: 99 FL — SIGNIFICANT CHANGE UP (ref 80–100)
MCV RBC AUTO: 99 FL — SIGNIFICANT CHANGE UP (ref 80–100)
NRBC # BLD: 3 /100 WBCS — HIGH (ref 0–0)
NRBC # BLD: 3 /100 WBCS — HIGH (ref 0–0)
PLATELET # BLD AUTO: 86 K/UL — LOW (ref 150–400)
PLATELET # BLD AUTO: 86 K/UL — LOW (ref 150–400)
POTASSIUM SERPL-MCNC: 4.4 MMOL/L — SIGNIFICANT CHANGE UP (ref 3.5–5.3)
POTASSIUM SERPL-MCNC: 4.4 MMOL/L — SIGNIFICANT CHANGE UP (ref 3.5–5.3)
POTASSIUM SERPL-SCNC: 4.4 MMOL/L — SIGNIFICANT CHANGE UP (ref 3.5–5.3)
POTASSIUM SERPL-SCNC: 4.4 MMOL/L — SIGNIFICANT CHANGE UP (ref 3.5–5.3)
RBC # BLD: 2.08 M/UL — LOW (ref 3.8–5.2)
RBC # BLD: 2.08 M/UL — LOW (ref 3.8–5.2)
RBC # BLD: 2.22 M/UL — LOW (ref 3.8–5.2)
RBC # BLD: 2.22 M/UL — LOW (ref 3.8–5.2)
RBC # FLD: 20.7 % — HIGH (ref 10.3–14.5)
RBC # FLD: 20.7 % — HIGH (ref 10.3–14.5)
RETICS #: 78.4 K/UL — SIGNIFICANT CHANGE UP (ref 25–125)
RETICS #: 78.4 K/UL — SIGNIFICANT CHANGE UP (ref 25–125)
RETICS/RBC NFR: 3.5 % — HIGH (ref 0.5–2.5)
RETICS/RBC NFR: 3.5 % — HIGH (ref 0.5–2.5)
SODIUM SERPL-SCNC: 138 MMOL/L — SIGNIFICANT CHANGE UP (ref 135–145)
SODIUM SERPL-SCNC: 138 MMOL/L — SIGNIFICANT CHANGE UP (ref 135–145)
TIBC SERPL-MCNC: 143 UG/DL — LOW (ref 220–430)
TIBC SERPL-MCNC: 143 UG/DL — LOW (ref 220–430)
UIBC SERPL-MCNC: 56 UG/DL — LOW (ref 110–370)
UIBC SERPL-MCNC: 56 UG/DL — LOW (ref 110–370)
VIT B12 SERPL-MCNC: 1243 PG/ML — SIGNIFICANT CHANGE UP (ref 232–1245)
VIT B12 SERPL-MCNC: 1243 PG/ML — SIGNIFICANT CHANGE UP (ref 232–1245)
WBC # BLD: 4.33 K/UL — SIGNIFICANT CHANGE UP (ref 3.8–10.5)
WBC # BLD: 4.33 K/UL — SIGNIFICANT CHANGE UP (ref 3.8–10.5)
WBC # FLD AUTO: 4.33 K/UL — SIGNIFICANT CHANGE UP (ref 3.8–10.5)
WBC # FLD AUTO: 4.33 K/UL — SIGNIFICANT CHANGE UP (ref 3.8–10.5)

## 2023-11-27 PROCEDURE — 99223 1ST HOSP IP/OBS HIGH 75: CPT | Mod: GC

## 2023-11-27 PROCEDURE — 99231 SBSQ HOSP IP/OBS SF/LOW 25: CPT | Mod: GC

## 2023-11-27 RX ORDER — BENZOCAINE AND MENTHOL 5; 1 G/100ML; G/100ML
1 LIQUID ORAL ONCE
Refills: 0 | Status: COMPLETED | OUTPATIENT
Start: 2023-11-27 | End: 2023-11-27

## 2023-11-27 RX ORDER — ACETAMINOPHEN 500 MG
650 TABLET ORAL ONCE
Refills: 0 | Status: COMPLETED | OUTPATIENT
Start: 2023-11-27 | End: 2023-11-27

## 2023-11-27 RX ADMIN — Medication 667 MILLIGRAM(S): at 21:26

## 2023-11-27 RX ADMIN — FAMOTIDINE 20 MILLIGRAM(S): 10 INJECTION INTRAVENOUS at 11:54

## 2023-11-27 RX ADMIN — Medication 650 MILLIGRAM(S): at 20:26

## 2023-11-27 RX ADMIN — Medication 667 MILLIGRAM(S): at 17:21

## 2023-11-27 RX ADMIN — BENZOCAINE AND MENTHOL 1 LOZENGE: 5; 1 LIQUID ORAL at 00:43

## 2023-11-27 RX ADMIN — Medication 667 MILLIGRAM(S): at 11:54

## 2023-11-27 RX ADMIN — Medication 650 MILLIGRAM(S): at 20:56

## 2023-11-27 RX ADMIN — Medication 667 MILLIGRAM(S): at 08:05

## 2023-11-27 NOTE — PROVIDER CONTACT NOTE (OTHER) - ASSESSMENT
HEMOGLOBIN 6.4  HEMATOCRIT 20.6
pt a&ox4, VSS, no acute distress noted. pt free from pain or discomfort. pt free from s/s of bleeding, bleeding precautions maintained.

## 2023-11-27 NOTE — PROGRESS NOTE ADULT - ASSESSMENT
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 Nephrology service consulted for ESRD management. Hgb this admission 6.4. Labs otherwise unremarkable given her hx. Last outpatient HD was on 11/24, which she tolerated well. CXR showed atelectasis. Dry weight is 57-58kg per pt and she urinates about a cup a day.

## 2023-11-27 NOTE — CONSULT NOTE ADULT - SUBJECTIVE AND OBJECTIVE BOX
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   Patient  states that she was referred to ED for low Hemoglobin.   Patient had her blood work earlier this week, got the results 2 days ago and noted to be dizzy, weak and naiuseaus this morning. No hx chest pain/sob.   Patient takes Jakofi and Danazol for Polycythemia as per her Hematologist and the dose was increased recently.     No hx EGD/ Colonoscopy          (26 Nov 2023 19:47)      Oncology Hx:      Allergies    No Known Allergies    Intolerances        MEDICATIONS  (STANDING):  calcium acetate 667 milliGRAM(s) Oral four times a day with meals  famotidine    Tablet 20 milliGRAM(s) Oral daily  influenza   Vaccine 0.5 milliLiter(s) IntraMuscular once    MEDICATIONS  (PRN):      PAST MEDICAL & SURGICAL HISTORY:  ESRD on dialysis      Anemia secondary to renal failure          FAMILY HISTORY:      SOCIAL HISTORY: No EtOH, no tobacco    REVIEW OF SYSTEMS:    CONSTITUTIONAL: No weakness, fevers or chills  EYES/ENT: No visual changes;  No vertigo or throat pain   NECK: No pain or stiffness  RESPIRATORY: No cough, wheezing, hemoptysis; No shortness of breath  CARDIOVASCULAR: No chest pain or palpitations  GASTROINTESTINAL: No abdominal or epigastric pain. No nausea, vomiting, or hematemesis; No diarrhea or constipation. No melena or hematochezia.  GENITOURINARY: No dysuria, frequency or hematuria  NEUROLOGICAL: No numbness or weakness  SKIN: No itching, burning, rashes, or lesions   All other review of systems is negative unless indicated above.    Height (cm): 152.4 (11-26 @ 19:47)  Weight (kg): 57 (11-26 @ 19:47)  BMI (kg/m2): 24.5 (11-26 @ 19:47)  BSA (m2): 1.53 (11-26 @ 19:47)    T(F): 97.9 (11-27-23 @ 04:34), Max: 98.8 (11-26-23 @ 12:45)  HR: 81 (11-27-23 @ 04:34)  BP: 123/70 (11-27-23 @ 04:34)  RR: 17 (11-27-23 @ 04:34)  SpO2: 98% (11-27-23 @ 04:34)  Wt(kg): --    GENERAL: NAD, well-developed  HEAD:  Atraumatic, Normocephalic  EYES: EOMI, PERRLA, conjunctiva and sclera clear  NECK: Supple, No JVD  CHEST/LUNG: Clear to auscultation bilaterally; No wheeze  HEART: Regular rate and rhythm; No murmurs, rubs, or gallops  ABDOMEN: Soft, Nontender, Nondistended; Bowel sounds present  EXTREMITIES:  2+ Peripheral Pulses, No clubbing, cyanosis, or edema  NEUROLOGY: non-focal  SKIN: No rashes or lesions                          6.4    4.33  )-----------( 86       ( 27 Nov 2023 07:18 )             20.6       11-27    138  |  99  |  43<H>  ----------------------------<  165<H>  4.4   |  24  |  9.01<H>    Ca    8.7      27 Nov 2023 07:18    TPro  7.8  /  Alb  4.3  /  TBili  1.2  /  DBili  x   /  AST  15  /  ALT  14  /  AlkPhos  77  11-26           Hematology/Oncology Consult Note    HPI:  63 year old female with hx of Polycythemia vera  JUAN JOSÉ 2 + since 2012, htn, ESRD on HD (M/W/F) via LUE AVF (2/2 chronic GN, 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   Patient  states that she was referred to ED for low Hemoglobin.   Patient had her blood work earlier this week, got the results 2 days ago and noted to be dizzy, weak and naiuseaus this morning. No hx chest pain/sob.   Patient takes Jakofi and Danazol for Polycythemia as per her Hematologist and the dose was increased recently.     No hx EGD/ Colonoscopy    (26 Nov 2023 19:47)    Heme Hx: Follows w/ Dr. Jacky Guo   2012: diagnosed w/ polycythemia vera w/ Juan José 2+ and splenomegaly   2015: splenic vein thrombosis   4767-1288: initially on/off hydroxyurea, more compliant since 2017 6/2021: concern for progression into myelofibrosis given cytopenias. 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  started on Jakafi 15mg TIW and later Danazol 200mg BID for worsening anemia.   2021-now: required multiple transfusions and hospitalization for symptomatic anemia. Jakafi dose adjustments from 15mg TIW->10-> 15->20->10 based on symptoms and transfusion requirements for anemia. Danazol increased to 600mg. Most recent w/ Dr. Guo on 8/2023. Jakafi lowered to 10mg TIW to see if this helps w/ anemia. Unclear if current anemia from progression of disease vs side effect of Jakafi     Allergies  No Known Allergies  Intolerances    MEDICATIONS  (STANDING):  calcium acetate 667 milliGRAM(s) Oral four times a day with meals  famotidine    Tablet 20 milliGRAM(s) Oral daily  influenza   Vaccine 0.5 milliLiter(s) IntraMuscular once  MEDICATIONS  (PRN):    PAST MEDICAL & SURGICAL HISTORY:  ESRD on dialysis  Anemia secondary to renal failure    FAMILY HISTORY:    SOCIAL HISTORY: No EtOH, no tobacco    REVIEW OF SYSTEMS:  CONSTITUTIONAL: No weakness, fevers or chills  EYES/ENT: No visual changes;  No vertigo or throat pain   NECK: No pain or stiffness  RESPIRATORY: No cough, wheezing, hemoptysis; No shortness of breath  CARDIOVASCULAR: No chest pain or palpitations  GASTROINTESTINAL: No abdominal or epigastric pain. No nausea, vomiting, or hematemesis; No diarrhea or constipation. No melena or hematochezia.  GENITOURINARY: No dysuria, frequency or hematuria  NEUROLOGICAL: No numbness or weakness  SKIN: No itching, burning, rashes, or lesions   All other review of systems is negative unless indicated above.    Height (cm): 152.4 (11-26 @ 19:47)  Weight (kg): 57 (11-26 @ 19:47)  BMI (kg/m2): 24.5 (11-26 @ 19:47)  BSA (m2): 1.53 (11-26 @ 19:47)    T(F): 97.9 (11-27-23 @ 04:34), Max: 98.8 (11-26-23 @ 12:45)  HR: 81 (11-27-23 @ 04:34)  BP: 123/70 (11-27-23 @ 04:34)  RR: 17 (11-27-23 @ 04:34)  SpO2: 98% (11-27-23 @ 04:34)  Wt(kg): --    GENERAL: NAD, well-developed  HEAD:  Atraumatic, Normocephalic  EYES: EOMI, PERRLA, conjunctiva and sclera clear  NECK: Supple, No JVD  CHEST/LUNG: Clear to auscultation bilaterally; No wheeze  HEART: Regular rate and rhythm; No murmurs, rubs, or gallops  ABDOMEN: Soft, Nontender, Nondistended; Bowel sounds present  EXTREMITIES:  2+ Peripheral Pulses, No clubbing, cyanosis, or edema  NEUROLOGY: non-focal  SKIN: No rashes or lesions                          6.4    4.33  )-----------( 86       ( 27 Nov 2023 07:18 )             20.6       11-27    138  |  99  |  43<H>  ----------------------------<  165<H>  4.4   |  24  |  9.01<H>    Ca    8.7      27 Nov 2023 07:18    TPro  7.8  /  Alb  4.3  /  TBili  1.2  /  DBili  x   /  AST  15  /  ALT  14  /  AlkPhos  77  11-26           Hematology/Oncology Consult Note    HPI:  63 year old female with hx of Polycythemia vera  JUAN JOSÉ 2 + since 2012, htn, ESRD on HD (M/W/F) via LUE AVF (2/2 chronic GN, 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   Patient  states that she was referred to ED for low Hemoglobin.   Patient had her blood work earlier this week, got the results 2 days ago and noted to be dizzy, weak and naiuseaus this morning. No hx chest pain/sob.   Patient takes Jakofi and Danazol for Polycythemia as per her Hematologist and the dose was increased recently.     No hx EGD/ Colonoscopy    (26 Nov 2023 19:47)    Interval Hx: Seen at bedside this afternoon. Noted she was advised to come to the hospital for further evaluation after low blood counts along w/ dizziness. Denied any blood in stools. Currently taking Jakafi 20mg three times a week, most recently last Friday and Danazol 600mg Sunday morning. Patient noted most recent transfusion of 1u pRBC on 10/16/2023. She reported 2-3 years ago, she required a blood transfusion every 3-4 months. However, now it is usually every other month.     Heme Hx: Follows w/ Dr. Jacky Guo   2012: diagnosed w/ polycythemia vera w/ Juan José 2+ and splenomegaly   2015: splenic vein thrombosis   1840-8814: initially on/off hydroxyurea, more compliant since 2017 6/2021: concern for progression into myelofibrosis given cytopenias. 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  started on Jakafi 15mg TIW and later Danazol 200mg BID for worsening anemia.   2021-now: required multiple transfusions and hospitalization for symptomatic anemia. Jakafi dose adjustments from 15mg TIW->10-> 15->20->10 based on symptoms and transfusion requirements for anemia. Danazol increased to 600mg. Most recent w/ Dr. Guo on 8/2023. Jakafi lowered to 10mg TIW to see if this helps w/ anemia. Unclear if current anemia from progression of disease vs side effect of Jakafi     Allergies  No Known Allergies  Intolerances    MEDICATIONS  (STANDING):  calcium acetate 667 milliGRAM(s) Oral four times a day with meals  famotidine    Tablet 20 milliGRAM(s) Oral daily  influenza   Vaccine 0.5 milliLiter(s) IntraMuscular once  MEDICATIONS  (PRN):    PAST MEDICAL & SURGICAL HISTORY:  ESRD on dialysis  Anemia secondary to renal failure    FAMILY HISTORY: Denied hx of easy bleeding, bruising, blood disorders.     SOCIAL HISTORY: No EtOH, no tobacco    REVIEW OF SYSTEMS:  CONSTITUTIONAL: +dizziness, No weakness, fevers or chills  EYES/ENT: No visual changes;  No vertigo or throat pain   NECK: No pain or stiffness  RESPIRATORY: No cough, wheezing, hemoptysis; No shortness of breath  CARDIOVASCULAR: No chest pain or palpitations  GASTROINTESTINAL: No abdominal or epigastric pain. No nausea, vomiting, or hematemesis; No diarrhea or constipation. No melena or hematochezia.  GENITOURINARY: No dysuria, frequency or hematuria  NEUROLOGICAL: No numbness or weakness  SKIN: No itching, burning, rashes, or lesions   All other review of systems is negative unless indicated above.    Height (cm): 152.4 (11-26 @ 19:47)  Weight (kg): 57 (11-26 @ 19:47)  BMI (kg/m2): 24.5 (11-26 @ 19:47)  BSA (m2): 1.53 (11-26 @ 19:47)    T(F): 97.9 (11-27-23 @ 04:34), Max: 98.8 (11-26-23 @ 12:45)  HR: 81 (11-27-23 @ 04:34)  BP: 123/70 (11-27-23 @ 04:34)  RR: 17 (11-27-23 @ 04:34)  SpO2: 98% (11-27-23 @ 04:34)  Wt(kg): --    GENERAL: NAD, well-developed  HEAD:  Atraumatic, Normocephalic  EYES: EOMI, PERRLA, conjunctiva and sclera clear  NECK: Supple, No JVD  CHEST/LUNG: Clear to auscultation bilaterally; No wheeze  HEART: Regular rate and rhythm; No murmurs, rubs, or gallops  ABDOMEN: Normoactive bowel sounds, soft, Nontender, Nondistended, splenomegaly  EXTREMITIES:  2+ Peripheral Pulses, No clubbing, cyanosis, or edema  NEUROLOGY: non-focal  SKIN: No rashes or lesions                       6.4    4.33  )-----------( 86       ( 27 Nov 2023 07:18 )             20.6     11-27    138  |  99  |  43<H>  ----------------------------<  165<H>  4.4   |  24  |  9.01<H>    Ca    8.7      27 Nov 2023 07:18    TPro  7.8  /  Alb  4.3  /  TBili  1.2  /  DBili  x   /  AST  15  /  ALT  14  /  AlkPhos  77  11-26           Hematology/Oncology Consult Note    HPI:  63 year old female with hx of Polycythemia vera  JUAN JOSÉ 2 + since 2012, htn, ESRD on HD (M/W/F) via LUE AVF (2/2 chronic GN, 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   Patient  states that she was referred to ED for low Hemoglobin.   Patient had her blood work earlier this week, got the results 2 days ago and noted to be dizzy, weak and naiuseaus this morning. No hx chest pain/sob.   Patient takes Jakofi and Danazol for Polycythemia as per her Hematologist and the dose was increased recently.     No hx EGD/ Colonoscopy    (26 Nov 2023 19:47)    Interval Hx: Seen at bedside this afternoon. Noted she was advised to come to the hospital for further evaluation after low blood counts along w/ dizziness. Denied any blood in stools. Currently taking Jakafi 20mg three times a week, most recently last Friday and Danazol 600mg Sunday morning. Patient noted most recent transfusion of 1u pRBC on 10/16/2023. She reported 2-3 years ago, she required a blood transfusion every 3-4 months. However, now it is usually every other month.     Heme Hx: Follows w/ Dr. Jacky Guo   2012: diagnosed w/ polycythemia vera w/ Juan José 2+ and splenomegaly   2015: splenic vein thrombosis   1115-4130: initially on/off hydroxyurea, more compliant since 2017 6/2021: concern for progression into myelofibrosis given cytopenias. 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 Also started on Jakafi 15mg TIW and later Danazol 200mg BID for worsening anemia.   2021-now: required multiple transfusions and hospitalization for symptomatic anemia. Jakafi dose adjustments from 15mg TIW->10-> 15->20->10 based on symptoms and transfusion requirements for anemia. Danazol increased to 600mg. Most recent w/ Dr. Guo on 8/2023. Jakafi lowered to 10mg TIW to see if this helps w/ anemia. Per Dr. Guo, unclear if current anemia from progression of disease vs side effect of Jakafi     Allergies  No Known Allergies  Intolerances    MEDICATIONS  (STANDING):  calcium acetate 667 milliGRAM(s) Oral four times a day with meals  famotidine    Tablet 20 milliGRAM(s) Oral daily  influenza   Vaccine 0.5 milliLiter(s) IntraMuscular once  MEDICATIONS  (PRN):    PAST MEDICAL & SURGICAL HISTORY:  ESRD on dialysis  Anemia secondary to renal failure    FAMILY HISTORY: Denied hx of easy bleeding, bruising, blood disorders.     SOCIAL HISTORY: No EtOH, no tobacco    REVIEW OF SYSTEMS:  CONSTITUTIONAL: +dizziness, No weakness, fevers or chills  EYES/ENT: No visual changes;  No vertigo or throat pain   NECK: No pain or stiffness  RESPIRATORY: No cough, wheezing, hemoptysis; No shortness of breath  CARDIOVASCULAR: No chest pain or palpitations  GASTROINTESTINAL: No abdominal or epigastric pain. No nausea, vomiting, or hematemesis; No diarrhea or constipation. No melena or hematochezia.  GENITOURINARY: No dysuria, frequency or hematuria  NEUROLOGICAL: No numbness or weakness  SKIN: No itching, burning, rashes, or lesions   All other review of systems is negative unless indicated above.    Height (cm): 152.4 (11-26 @ 19:47)  Weight (kg): 57 (11-26 @ 19:47)  BMI (kg/m2): 24.5 (11-26 @ 19:47)  BSA (m2): 1.53 (11-26 @ 19:47)    T(F): 97.9 (11-27-23 @ 04:34), Max: 98.8 (11-26-23 @ 12:45)  HR: 81 (11-27-23 @ 04:34)  BP: 123/70 (11-27-23 @ 04:34)  RR: 17 (11-27-23 @ 04:34)  SpO2: 98% (11-27-23 @ 04:34)  Wt(kg): --    GENERAL: NAD, well-developed  HEAD:  Atraumatic, Normocephalic  EYES: EOMI, PERRLA, conjunctiva and sclera clear  NECK: Supple, No JVD  CHEST/LUNG: Clear to auscultation bilaterally; No wheeze  HEART: Regular rate and rhythm; No murmurs, rubs, or gallops  ABDOMEN: Normoactive bowel sounds, soft, Nontender, Nondistended, splenomegaly  EXTREMITIES:  2+ Peripheral Pulses, No clubbing, cyanosis, or edema  NEUROLOGY: non-focal  SKIN: No rashes or lesions                       6.4    4.33  )-----------( 86       ( 27 Nov 2023 07:18 )             20.6     11-27    138  |  99  |  43<H>  ----------------------------<  165<H>  4.4   |  24  |  9.01<H>    Ca    8.7      27 Nov 2023 07:18    TPro  7.8  /  Alb  4.3  /  TBili  1.2  /  DBili  x   /  AST  15  /  ALT  14  /  AlkPhos  77  11-26           Hematology/Oncology Consult Note    HPI:  63 year old female with hx of Polycythemia vera  JUAN JOSÉ 2 + since 2012, htn, ESRD on HD (M/W/F) via LUE AVF (2/2 chronic GN, 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   Patient  states that she was referred to ED for low Hemoglobin.   Patient had her blood work earlier this week, got the results 2 days ago and noted to be dizzy, weak and naiuseaus this morning. No hx chest pain/sob.   Patient takes Jakofi and Danazol for Polycythemia as per her Hematologist and the dose was increased recently.     No hx EGD/ Colonoscopy    (26 Nov 2023 19:47)    Interval Hx: Seen at bedside this afternoon. Noted she was advised to come to the hospital for further evaluation after low blood counts along w/ dizziness. Denied any blood in stools. Currently taking Jakafi 20mg three times a week, most recently last Friday and Danazol 600mg Sunday morning. Patient noted most recent transfusion of 1u pRBC on 10/16/2023. She reported 2-3 years ago, she required a blood transfusion every 3-4 months. However, now it is usually every other month.     Heme Hx: Follows w/ Dr. Jacky Guo   2012: diagnosed w/ polycythemia vera w/ Juan José 2+ and splenomegaly   2015: splenic vein thrombosis   7204-6293: initially on/off hydroxyurea, more compliant since 2017 6/2021: concern for progression into myelofibrosis given cytopenias. 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 Also started on Jakafi 15mg three times a week and later Danazol 200mg BID for worsening anemia.   2021-now: required multiple transfusions and hospitalization for symptomatic anemia. Jakafi dose adjustments from 15mg 3x/week >10-> 15->20->10 based on symptoms and transfusion requirements for anemia. Danazol increased to 600mg. Per chart review, patient's last appointment w/ Dr Guo was in 8/2023 and Jakafi was lowered to 10mg TIW to see if this helps w/ anemia. Per Dr. Guo, unclear if current anemia from progression of disease vs side effect of Jakafi     Allergies  No Known Allergies  Intolerances    MEDICATIONS  (STANDING):  calcium acetate 667 milliGRAM(s) Oral four times a day with meals  famotidine    Tablet 20 milliGRAM(s) Oral daily  influenza   Vaccine 0.5 milliLiter(s) IntraMuscular once  MEDICATIONS  (PRN):    PAST MEDICAL & SURGICAL HISTORY:  ESRD on dialysis  Anemia secondary to renal failure    FAMILY HISTORY: Denied hx of easy bleeding, bruising, blood disorders.     SOCIAL HISTORY: No EtOH, no tobacco    REVIEW OF SYSTEMS:  CONSTITUTIONAL: +dizziness, No weakness, fevers or chills  EYES/ENT: No visual changes;  No vertigo or throat pain   NECK: No pain or stiffness  RESPIRATORY: No cough, wheezing, hemoptysis; No shortness of breath  CARDIOVASCULAR: No chest pain or palpitations  GASTROINTESTINAL: No abdominal or epigastric pain. No nausea, vomiting, or hematemesis; No diarrhea or constipation. No melena or hematochezia.  GENITOURINARY: No dysuria, frequency or hematuria  NEUROLOGICAL: No numbness or weakness  SKIN: No itching, burning, rashes, or lesions   All other review of systems is negative unless indicated above.    Height (cm): 152.4 (11-26 @ 19:47)  Weight (kg): 57 (11-26 @ 19:47)  BMI (kg/m2): 24.5 (11-26 @ 19:47)  BSA (m2): 1.53 (11-26 @ 19:47)    T(F): 97.9 (11-27-23 @ 04:34), Max: 98.8 (11-26-23 @ 12:45)  HR: 81 (11-27-23 @ 04:34)  BP: 123/70 (11-27-23 @ 04:34)  RR: 17 (11-27-23 @ 04:34)  SpO2: 98% (11-27-23 @ 04:34)  Wt(kg): --    GENERAL: NAD, well-developed  HEAD:  Atraumatic, Normocephalic  EYES: EOMI, PERRLA, conjunctiva and sclera clear  NECK: Supple, No JVD  CHEST/LUNG: Clear to auscultation bilaterally; No wheeze  HEART: Regular rate and rhythm; No murmurs, rubs, or gallops  ABDOMEN: Normoactive bowel sounds, soft, Nontender, Nondistended, splenomegaly  EXTREMITIES:  2+ Peripheral Pulses, No clubbing, cyanosis, or edema  NEUROLOGY: non-focal  SKIN: No rashes or lesions                       6.4    4.33  )-----------( 86       ( 27 Nov 2023 07:18 )             20.6     11-27    138  |  99  |  43<H>  ----------------------------<  165<H>  4.4   |  24  |  9.01<H>    Ca    8.7      27 Nov 2023 07:18    TPro  7.8  /  Alb  4.3  /  TBili  1.2  /  DBili  x   /  AST  15  /  ALT  14  /  AlkPhos  77  11-26

## 2023-11-27 NOTE — PROVIDER CONTACT NOTE (CRITICAL VALUE NOTIFICATION) - ASSESSMENT
pt a&ox4, VSS, no acute distress noted. pt free from pain or discomfort. pt free from s/s of bleeding, bleeding precautions maintained.

## 2023-11-27 NOTE — CONSULT NOTE ADULT - ATTENDING COMMENTS
Agree above.  Patient has no signs of bleeding, denies any melena or hematochezia.  She reports that she has had prior endoscopy and colonoscopy 2 years ago, that were normal and was done for also anemia.  At this time would pursue hematological work-up, check iron studies.  Given no signs of active bleeding, no immediate plans for endoscopic evaluation at this time. Discussed with and her daughter at bedside. Would try to obtain EGD/colonoscopy records from 2  years ago for review, prior colonoscopy found in [facility] done in 2018.
The patient is seen with Dr SERGEY Chen who also reviewed the peripheral blood smear with our group. The Peripheral blood smear shows presence of transfused red cells used in the treatment of her anemia related to chronic renal disease cells are showing effects of chronic renal disease with membrane of spur cell abnormality.   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 Please continue dialysis and transfusion support in management of her anemia related to chronic renal disease stage 5 requiring dialysis.

## 2023-11-27 NOTE — CONSULT NOTE ADULT - ASSESSMENT
-  This is a 63 year old female with hx of Polycythemia vera MAK 2+ (2012) w/ secondary myelofibrosis, ESRD on HD (M/W/F) via LUE AVF (2/2 chronic GN, started Dec 2017), splenic vein thrombosis (2015), who presented to the ED for symptomatic anemia s/p 1u pRBC. Heme consulted for management of anemia    #Polycythemia vera w/ secondary myelofibrosis  #ESRD on HD   #Splenomegaly   Patient w/ polycythemia vera JAK2+ diagnosed in 2012, w/ secondary myelofibrosis seen on bone marrow biopsy in 2021. Presented w/ Hgb 6.4, s/p 1u pRBC but did not correct appropriately (repeat hgb 6.4). Anemia likely multifactorial due to ESRD, likely progression of myelofibrosis, and use of Jakafi. Concern for bleed vs hemolysis.   - order LDH, haptoglobin, B12, folate, reticulocyte count  - order iron studies: iron, ferritin, TIBC  - will review peripheral smear  - can hold Jakafi for now     ** note incomplete** This is a 63 year old female with hx of Polycythemia vera MAK 2+ (2012) w/ secondary myelofibrosis, ESRD on HD (M/W/F) via LUE AVF (2/2 chronic GN, started Dec 2017), splenic vein thrombosis (2015), who presented to the ED for symptomatic anemia s/p 1u pRBC. Heme consulted for management of anemia.     #Polycythemia vera w/ secondary myelofibrosis  #ESRD on HD   #Splenomegaly   Patient w/ polycythemia vera JAK2+ diagnosed in 2012, w/ secondary myelofibrosis seen on bone marrow biopsy in 2021. Presented w/ Hgb 6.4, s/p 1u pRBC but did not correct appropriately (repeat hgb 6.4). Patient noted worsening anemia and more frequent transfusions since early this year. Likely multifactorial likely due to progression of myelofibrosis, ESRD and use of Jakafi. Peripheral smear showed mostly pale RBCs, tear drop cells and rare RBC fragments. Will workup anemia for active bleed vs hemolysis.  - ordered LDH, haptoglobin, B12, folate, reticulocyte count, iron, ferritin, TIBC  - recommend transfusion of 1u pRBC for hgb goal >7. However, nonurgent and can time w/ dialysis or nephro on when to administer for volume optimization   - can hold Jakafi and danazol for now while undergoing workup     ** note incomplete until attending attestation** This is a 63 year old female with hx of Polycythemia vera MAK 2+ (2012) w/ secondary myelofibrosis, ESRD on HD (M/W/F) via LUE AVF (2/2 chronic GN, started Dec 2017), splenic vein thrombosis (2015), who presented to the ED for symptomatic anemia s/p 1u pRBC. Heme consulted for management of anemia.     #Polycythemia vera w/ secondary myelofibrosis  #ESRD on HD   #Splenomegaly   Patient w/ polycythemia vera JAK2+ diagnosed in 2012, w/ secondary myelofibrosis seen on bone marrow biopsy in 2021. Presented w/ Hgb 6.4, s/p 1u pRBC but did not correct appropriately (repeat hgb 6.4). Patient noted worsening anemia and more frequent transfusions since early this year. Likely multifactorial due to progression of myelofibrosis, ESRD and use of Jakafi. Peripheral smear showed mostly pale RBCs, tear drop cells and rare RBC fragments. Will workup anemia for active bleed vs hemolysis.  - ordered LDH, haptoglobin, B12, folate, reticulocyte count, iron, ferritin, TIBC  - recommend transfusion of 1u pRBC for hgb goal >7. However, nonurgent and can time w/ dialysis or nephro on when to administer for volume optimization   - can hold Jakafi and danazol for now while undergoing workup     ** note incomplete until attending attestation**

## 2023-11-27 NOTE — PROVIDER CONTACT NOTE (OTHER) - ACTION/TREATMENT ORDERED:
no new orders at this time, discussed w/ attending, awaiting hematology recommendations in the AM.
Awaiting recommendations

## 2023-11-27 NOTE — PROVIDER CONTACT NOTE (OTHER) - BACKGROUND
pt admitted for anemia, low hgb on outpatient labs. pmhx polycythemia and ESRD on HD.
Patient is a 64 y/o female admitted for CKD

## 2023-11-27 NOTE — CONSULT NOTE ADULT - ASSESSMENT
63 year old female with history of HTN, ESRD on HD MWF, polycythemia vera, secondary myelofibrosis, JAK2+, splenic vein thrombosis in 2015 who presents with low hemoglobin on outpatient labs.  Patient sent over for low hemoglobin on outpatient labs.  Hg found to be 6.4 with associated dizziness and weakness, however patient denies overt GI bleeding.  She requires intermittent transfusions and has had extensive hematologic workup.  Otherwise, patient denies fevers, chills, weight loss, dysphagia, odynophagia, early satiety, poor oral intake, abdominal pain, nausea, vomiting, diarrhea, melena, hematemesis, hematochezia, change in stool caliber, or family history of GI-related cancers.    # Normocytic anemia  # Thrombocytopenia  # Polycythemia vera  # Secondary myelofibrosis  # JAK2+    Recommendations:  -trend vitals, CBC, and monitor for clinical signs of bleeding  -maintain active type and screen  -transfusion goal to maintain hemoglobin >/= 7.0 and platelets >/= 50  -rule out other causes for anemia [consider iron studies, ferritin, vitamin B12, folate, or hemolysis workup with haptoglobin, LDH, reticulocytes, peripheral blood smear]  -avoid NSAIDs  -appreciate Heme/Onc evaluation  -no plans for endoscopy, colonoscopy, or inpatient GI interventions    Note incomplete until finalized by attending signature/attestation.    Neymar Adams  GI/Hepatology Fellow    MONDAY-FRIDAY 8AM-5PM:  Pager# [phone] ([facility]) or [phone] ([facility])    NON-URGENT CONSULTS:  Please email [email or web address]  AT NIGHT AND ON WEEKENDS:  Contact on-call GI fellow via answering service ([phone]) from 5pm-8am and on weekends/holidays 63 year old female with history of HTN, ESRD on HD MWF, polycythemia vera, secondary myelofibrosis, JAK2+, splenic vein thrombosis in 2015 who presents with low hemoglobin on outpatient labs.  Patient sent over for low hemoglobin on outpatient labs.  Hg found to be 6.4 with associated dizziness and weakness, however patient denies overt GI bleeding.  She requires intermittent transfusions and has had extensive hematologic workup.  Otherwise, patient denies fevers, chills, weight loss, dysphagia, odynophagia, early satiety, poor oral intake, abdominal pain, nausea, vomiting, diarrhea, melena, hematemesis, hematochezia, change in stool caliber, or family history of GI-related cancers.    # Normocytic anemia  # Thrombocytopenia  # Polycythemia vera  # Secondary myelofibrosis  # JAK2+    Recommendations:  -trend vitals, CBC, and monitor for clinical signs of bleeding  -maintain active type and screen  -transfusion goal to maintain hemoglobin >/= 7.0 and platelets >/= 50  -rule out other causes for anemia [consider iron studies, ferritin, vitamin B12, folate, or hemolysis workup with haptoglobin, LDH, reticulocytes, peripheral blood smear]  -Prior EGD/COlonoscopy reviewed from 2018 and 2014 in allscripts  -avoid NSAIDs  -appreciate Heme/Onc evaluation  -no plans for endoscopy, colonoscopy, or inpatient GI interventions given no active bleeding and more likely anemia due to potential underlying bone marrow process/ESRD    Note incomplete until finalized by attending signature/attestation.    Neymar Adams  GI/Hepatology Fellow    MONDAY-FRIDAY 8AM-5PM:  Pager# [phone] ([facility]) or [phone] ([facility])    NON-URGENT CONSULTS:  Please email [email or web address]  AT NIGHT AND ON WEEKENDS:  Contact on-call GI fellow via answering service ([phone]) from 5pm-8am and on weekends/holidays

## 2023-11-27 NOTE — CHART NOTE - NSCHARTNOTEFT_GEN_A_CORE
Medicine ACP Event Note  Date of Service: 11-27-23 @ 00:11    Subjective: Notified by RN patient's hemoglobin came back 6.5 post transfusion. Patient seen and examined at bedside. Patient denies any chest pain, palpitations, fever, cough, sob, dizziness, lightheadness at this time. VSS      Objective Findings:  T(C): 36.7 (11-26-23 @ 20:13), Max: 37.1 (11-26-23 @ 12:45)  HR: 83 (11-26-23 @ 20:13) (77 - 87)  BP: 100/61 (11-26-23 @ 20:13) (100/61 - 113/72)  RR: 16 (11-26-23 @ 20:13) (14 - 20)  SpO2: 98% (11-26-23 @ 20:13) (94% - 100%)  Wt(kg): --  Daily Height in cm: 152.4 (26 Nov 2023 19:47)    Daily           Telemetry:     Laboratory Data:                        6.5    4.35  )-----------( 86       ( 26 Nov 2023 23:45 )             20.5     11-26    138  |  96  |  34<H>  ----------------------------<  143<H>  4.0   |  25  |  8.01<H>    Ca    9.1      26 Nov 2023 14:19    TPro  7.8  /  Alb  4.3  /  TBili  1.2  /  DBili  x   /  AST  15  /  ALT  14  /  AlkPhos  77  11-26    PT/INR - ( 26 Nov 2023 14:19 )   PT: 12.0 sec;   INR: 1.15 ratio         PTT - ( 26 Nov 2023 14:19 )  PTT:28.4 sec          Inpatient Medications:  MEDICATIONS  (STANDING):  calcium acetate 667 milliGRAM(s) Oral four times a day with meals  famotidine    Tablet 20 milliGRAM(s) Oral daily  influenza   Vaccine 0.5 milliLiter(s) IntraMuscular once      Assessment/Plan of Care:  63 year old female with hx of Polycythemia Vera, MAK 2 + since 2012, splenic vein thrombosis (2015), ESRD on HD (M/W/F) via LUE AVF (2/2 chronic GN, 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     #Repeat Hgb 6.5  - Discussed with attending overnight to wait for heme/onc recommendations in am given patient's history of heme disorders   - Hematology consult in am   - Tranfuse PRN  - Discussed plan with patient who understands and agrees   - Will monitor patient closely overnight  - Will endorse to day team in AM           Juan Jose Martines PA-C  Medicine ACP

## 2023-11-27 NOTE — PROGRESS NOTE ADULT - SUBJECTIVE AND OBJECTIVE BOX
[facility] Division of Kidney Diseases & Hypertension  FOLLOW UP NOTE  [phone]--------------------------------------------------------------------------------  Chief Complaint:Chronic kidney disease    24 hour events/subjective:  No acute overnight events. No new complaints.   She denies all other symptoms of acute distress.           PAST HISTORY  --------------------------------------------------------------------------------  No significant changes to PMH, PSH, FHx, SHx, unless otherwise noted    ALLERGIES & MEDICATIONS  --------------------------------------------------------------------------------  Allergies    No Known Allergies    Intolerances      Standing Inpatient Medications  calcium acetate 667 milliGRAM(s) Oral four times a day with meals  famotidine    Tablet 20 milliGRAM(s) Oral daily  influenza   Vaccine 0.5 milliLiter(s) IntraMuscular once    PRN Inpatient Medications      REVIEW OF SYSTEMS  --------------------------------------------------------------------------------  Gen: No  fevers/chills  Skin: No rashes  Head/Eyes/Ears/Mouth: No headache; Normal hearing; Normal vision w/o blurriness  Respiratory: No dyspnea, cough, wheezing, hemoptysis  CV: No chest pain, PND, orthopnea  GI: No abdominal pain, diarrhea, constipation, nausea, vomiting  : No increased frequency, dysuria, hematuria, nocturia  MSK: No joint pain/swelling; no back pain; no edema  Neuro: No dizziness/lightheadedness, weakness, seizures, numbness, tingling      All other systems were reviewed and are negative, except as noted.    VITALS/PHYSICAL EXAM  --------------------------------------------------------------------------------  T(C): 36.4 (11-27-23 @ 13:16), Max: 36.9 (11-26-23 @ 17:47)  HR: 70 (11-27-23 @ 13:16) (70 - 84)  BP: 130/72 (11-27-23 @ 13:16) (100/61 - 131/76)  RR: 18 (11-27-23 @ 13:16) (14 - 18)  SpO2: 98% (11-27-23 @ 13:16) (95% - 100%)  Wt(kg): --  Height (cm): 152.4 (11-26-23 @ 19:47)  Weight (kg): 57 (11-26-23 @ 19:47)  BMI (kg/m2): 24.5 (11-26-23 @ 19:47)  BSA (m2): 1.53 (11-26-23 @ 19:47)      11-27-23 @ 07:01  -  11-27-23 @ 14:58  --------------------------------------------------------  IN: 120 mL / OUT: 0 mL / NET: 120 mL      Physical Exam:  General: no acute distress  Neuro: no focal deficits  HEENT: NC/AT, anicteric, no JVD  Pulmonary: lungs CTA B/L  Cardiovascular/Chest: +S1S2, RRR  GI/Abdomen: soft, NT/ND, +bowel sounds  Extremities: No edema  Skin: Warm and dry  Vascular access: AVF thrill      LABS/STUDIES  --------------------------------------------------------------------------------              6.4    4.33  >-----------<  86       [11-27-23 @ 07:18]              20.6     138  |  99  |  43  ----------------------------<  165      [11-27-23 @ 07:18]  4.4   |  24  |  9.01        Ca     8.7     [11-27-23 @ 07:18]    TPro  7.8  /  Alb  4.3  /  TBili  1.2  /  DBili  x   /  AST  15  /  ALT  14  /  AlkPhos  77  [11-26-23 @ 14:19]    PT/INR: PT 12.0 , INR 1.15       [11-26-23 @ 14:19]  PTT: 28.4       [11-26-23 @ 14:19]          [11-27-23 @ 13:56]    Creatinine Trend:  SCr 9.01 [11-27 @ 07:18]  SCr 8.01 [11-26 @ 14:19]    Urinalysis - [11-27-23 @ 07:18]      Color  / Appearance  / SG  / pH       Gluc 165 / Ketone   / Bili  / Urobili        Blood  / Protein  / Leuk Est  / Nitrite       RBC  / WBC  / Hyaline  / Gran  / Sq Epi  / Non Sq Epi  / Bacteria         HBsAg Nonreact      [11-27-23 @ 13:56]  HCV 0.13, Nonreact      [11-27-23 @ 07:22]

## 2023-11-27 NOTE — CONSULT NOTE ADULT - SUBJECTIVE AND OBJECTIVE BOX
HPI:  MI HYEON HU is a 63 year old female with history of HTN, ESRD on HD MWF, polycythemia vera, JAK2+, splenic vein thrombosis in 2015          Otherwise, patient denies fevers, chills, weight loss, dysphagia, odynophagia, early satiety, poor oral intake, abdominal pain, nausea, vomiting, diarrhea, melena, hematemesis, hematochezia, change in stool caliber, or family history of GI-related cancers.    ROS:   General:  No fevers, chills, night sweats, fatigue  Eyes:  Good vision, no reported pain  ENT:  No sore throat, pain, runny nose  CV:  No pain, palpitations  Pulm:  No dyspnea, cough  GI:  See HPI, otherwise negative  :  No  incontinence, nocturia  Muscle:  No pain, weakness  Neuro:  No memory problems  Psych:  No insomnia, mood problems, depression  Endocrine:  No polyuria, polydipsia, cold/heat intolerance  Heme:  No petechiae, ecchymosis, easy bruisability  Skin:  No rash    PMHX/PSHX:    ESRD on dialysis    Anemia secondary to renal failure      Allergies:  No Known Allergies      Home Medications: reviewed  Hospital Medications:  calcium acetate 667 milliGRAM(s) Oral four times a day with meals  famotidine    Tablet 20 milliGRAM(s) Oral daily  influenza   Vaccine 0.5 milliLiter(s) IntraMuscular once      Social History:   Tobacco: denies  Alcohol: denies  Recreational drugs: denies    Family history:      Denies family history of colon cancer/polyps, stomach cancer/polyps, pancreatic cancer/masses, liver cancer/disease, ovarian cancer and endometrial cancer.    PHYSICAL EXAM:   Vital Signs:  Vital Signs Last 24 Hrs  T(C): 36.4 (2023 13:16), Max: 36.9 (2023 17:47)  T(F): 97.5 (2023 13:16), Max: 98.4 (2023 17:47)  HR: 70 (2023 13:16) (70 - 84)  BP: 130/72 (2023 13:16) (100/61 - 131/76)  BP(mean): 76 (2023 19:47) (76 - 76)  RR: 18 (2023 13:16) (14 - 18)  SpO2: 98% (2023 13:16) (95% - 100%)    Parameters below as of 2023 13:16  Patient On (Oxygen Delivery Method): room air      Daily Height in cm: 152.4 (2023 19:47)    Daily Weight in k (2023 13:16)    GENERAL: no acute distress  NEURO: alert  HEENT: NCAT, no conjunctival pallor appreciated  CHEST: no respiratory distress, no accessory muscle use  CARDIAC: regular rate, +S1/S2  ABDOMEN: soft, nontender, no rebound or guarding  EXTREMITIES: warm, well perfused  SKIN: no lesions noted    LABS: reviewed                        6.4    4.33  )-----------( 86       ( 2023 07:18 )             20.6     11-    138  |  99  |  43<H>  ----------------------------<  165<H>  4.4   |  24  |  9.01<H>    Ca    8.7      2023 07:18    TPro  7.8  /  Alb  4.3  /  TBili  1.2  /  DBili  x   /  AST  15  /  ALT  14  /  AlkPhos  77  11-26    LIVER FUNCTIONS - ( 2023 14:19 )  Alb: 4.3 g/dL / Pro: 7.8 g/dL / ALK PHOS: 77 U/L / ALT: 14 U/L / AST: 15 U/L / GGT: x               Diagnostic Studies: see sunrise for full report         HPI:  MI HYEON HU is a 63 year old female with history of HTN, ESRD on HD MWF, polycythemia vera, secondary myelofibrosis, JAK2+, splenic vein thrombosis in  who presents with low hemoglobin on outpatient labs.    Patient sent over for low hemoglobin on outpatient labs.  Hg found to be 6.4 with associated dizziness and weakness, however patient denies overt GI bleeding.  She requires intermittent transfusions and has had extensive hematologic workup.  Otherwise, patient denies fevers, chills, weight loss, dysphagia, odynophagia, early satiety, poor oral intake, abdominal pain, nausea, vomiting, diarrhea, melena, hematemesis, hematochezia, change in stool caliber, or family history of GI-related cancers.    ROS:   General:  No fevers, chills, night sweats, fatigue  Eyes:  Good vision, no reported pain  ENT:  No sore throat, pain, runny nose  CV:  No pain, palpitations  Pulm:  No dyspnea, cough  GI:  See HPI, otherwise negative  :  No  incontinence, nocturia  Muscle:  No pain, weakness  Neuro:  No memory problems  Psych:  No insomnia, mood problems, depression  Endocrine:  No polyuria, polydipsia, cold/heat intolerance  Heme:  No petechiae, ecchymosis, easy bruisability  Skin:  No rash    PMHX/PSHX:    ESRD on dialysis    Anemia secondary to renal failure      Allergies:  No Known Allergies      Home Medications: reviewed  Hospital Medications:  calcium acetate 667 milliGRAM(s) Oral four times a day with meals  famotidine    Tablet 20 milliGRAM(s) Oral daily  influenza   Vaccine 0.5 milliLiter(s) IntraMuscular once      Social History:   Tobacco: denies  Alcohol: denies  Recreational drugs: denies    Family history:      Denies family history of colon cancer/polyps, stomach cancer/polyps, pancreatic cancer/masses, liver cancer/disease, ovarian cancer and endometrial cancer.    PHYSICAL EXAM:   Vital Signs:  Vital Signs Last 24 Hrs  T(C): 36.4 (2023 13:16), Max: 36.9 (2023 17:47)  T(F): 97.5 (2023 13:16), Max: 98.4 (2023 17:47)  HR: 70 (2023 13:16) (70 - 84)  BP: 130/72 (2023 13:16) (100/61 - 131/76)  BP(mean): 76 (2023 19:47) (76 - 76)  RR: 18 (2023 13:16) (14 - 18)  SpO2: 98% (2023 13:16) (95% - 100%)    Parameters below as of 2023 13:16  Patient On (Oxygen Delivery Method): room air      Daily Height in cm: 152.4 (2023 19:47)    Daily Weight in k (2023 13:16)    GENERAL: no acute distress  NEURO: alert  HEENT: NCAT, no conjunctival pallor appreciated  CHEST: no respiratory distress, no accessory muscle use  CARDIAC: regular rate, +S1/S2  ABDOMEN: soft, nontender, no rebound or guarding  EXTREMITIES: warm, well perfused  SKIN: no lesions noted    LABS: reviewed                        6.4    4.33  )-----------( 86       ( 2023 07:18 )             20.6     11-27    138  |  99  |  43<H>  ----------------------------<  165<H>  4.4   |  24  |  9.01<H>    Ca    8.7      2023 07:18    TPro  7.8  /  Alb  4.3  /  TBili  1.2  /  DBili  x   /  AST  15  /  ALT  14  /  AlkPhos  77  11-26    LIVER FUNCTIONS - ( 2023 14:19 )  Alb: 4.3 g/dL / Pro: 7.8 g/dL / ALK PHOS: 77 U/L / ALT: 14 U/L / AST: 15 U/L / GGT: x               Diagnostic Studies: see sunrise for full report         HPI:  MI HYEON HU is a 63 year old female with history of HTN, ESRD on HD MWF, polycythemia vera, secondary myelofibrosis, JAK2+, splenic vein thrombosis in  who presents with low hemoglobin on outpatient labs.    Patient sent over for low hemoglobin on outpatient labs.  Hg found to be 6.4 with associated dizziness and weakness, however patient denies overt GI bleeding.  She requires intermittent transfusions and has had extensive hematologic workup.  Otherwise, patient denies fevers, chills, weight loss, dysphagia, odynophagia, early satiety, poor oral intake, abdominal pain, nausea, vomiting, diarrhea, melena, hematemesis, hematochezia, change in stool caliber, or family history of GI-related cancers. Reports having had EGD/colonoscopy 2 years ago that was negative also for anemia.    ROS:   General:  No fevers, chills, night sweats, fatigue  Eyes:  Good vision, no reported pain  ENT:  No sore throat, pain, runny nose  CV:  No pain, palpitations  Pulm:  No dyspnea, cough  GI:  See HPI, otherwise negative  :  No  incontinence, nocturia  Muscle:  No pain, weakness  Neuro:  No memory problems  Psych:  No insomnia, mood problems, depression  Endocrine:  No polyuria, polydipsia, cold/heat intolerance  Heme:  No petechiae, ecchymosis, easy bruisability  Skin:  No rash    PMHX/PSHX:    ESRD on dialysis    Anemia secondary to renal failure      Allergies:  No Known Allergies      Home Medications: reviewed  Hospital Medications:  calcium acetate 667 milliGRAM(s) Oral four times a day with meals  famotidine    Tablet 20 milliGRAM(s) Oral daily  influenza   Vaccine 0.5 milliLiter(s) IntraMuscular once      Social History:   Tobacco: denies  Alcohol: denies  Recreational drugs: denies    Family history:      Denies family history of colon cancer/polyps, stomach cancer/polyps, pancreatic cancer/masses, liver cancer/disease, ovarian cancer and endometrial cancer.    PHYSICAL EXAM:   Vital Signs:  Vital Signs Last 24 Hrs  T(C): 36.4 (2023 13:16), Max: 36.9 (2023 17:47)  T(F): 97.5 (2023 13:16), Max: 98.4 (2023 17:47)  HR: 70 (2023 13:16) (70 - 84)  BP: 130/72 (2023 13:16) (100/61 - 131/76)  BP(mean): 76 (2023 19:47) (76 - 76)  RR: 18 (2023 13:16) (14 - 18)  SpO2: 98% (2023 13:16) (95% - 100%)    Parameters below as of 2023 13:16  Patient On (Oxygen Delivery Method): room air      Daily Height in cm: 152.4 (2023 19:47)    Daily Weight in k (2023 13:16)    GENERAL: no acute distress  NEURO: alert  HEENT: NCAT, no conjunctival pallor appreciated  CHEST: no respiratory distress, no accessory muscle use  CARDIAC: regular rate, +S1/S2  ABDOMEN: soft, nontender, no rebound or guarding  EXTREMITIES: warm, well perfused  SKIN: no lesions noted    LABS: reviewed                        6.4    4.33  )-----------( 86       ( 2023 07:18 )             20.6     11-27    138  |  99  |  43<H>  ----------------------------<  165<H>  4.4   |  24  |  9.01<H>    Ca    8.7      2023 07:18    TPro  7.8  /  Alb  4.3  /  TBili  1.2  /  DBili  x   /  AST  15  /  ALT  14  /  AlkPhos  77  11-26    LIVER FUNCTIONS - ( 2023 14:19 )  Alb: 4.3 g/dL / Pro: 7.8 g/dL / ALK PHOS: 77 U/L / ALT: 14 U/L / AST: 15 U/L / GGT: x               Diagnostic Studies: see sunrise for full report

## 2023-11-28 ENCOUNTER — TRANSCRIPTION ENCOUNTER (OUTPATIENT)
Age: 63
End: 2023-11-28

## 2023-11-28 LAB
ANION GAP SERPL CALC-SCNC: 11 MMOL/L — SIGNIFICANT CHANGE UP (ref 5–17)
ANION GAP SERPL CALC-SCNC: 11 MMOL/L — SIGNIFICANT CHANGE UP (ref 5–17)
BUN SERPL-MCNC: 22 MG/DL — SIGNIFICANT CHANGE UP (ref 7–23)
BUN SERPL-MCNC: 22 MG/DL — SIGNIFICANT CHANGE UP (ref 7–23)
CALCIUM SERPL-MCNC: 8.9 MG/DL — SIGNIFICANT CHANGE UP (ref 8.4–10.5)
CALCIUM SERPL-MCNC: 8.9 MG/DL — SIGNIFICANT CHANGE UP (ref 8.4–10.5)
CALCIUM SERPL-MCNC: 9.3 MG/DL — SIGNIFICANT CHANGE UP (ref 8.4–10.5)
CALCIUM SERPL-MCNC: 9.3 MG/DL — SIGNIFICANT CHANGE UP (ref 8.4–10.5)
CHLORIDE SERPL-SCNC: 97 MMOL/L — SIGNIFICANT CHANGE UP (ref 96–108)
CHLORIDE SERPL-SCNC: 97 MMOL/L — SIGNIFICANT CHANGE UP (ref 96–108)
CO2 SERPL-SCNC: 28 MMOL/L — SIGNIFICANT CHANGE UP (ref 22–31)
CO2 SERPL-SCNC: 28 MMOL/L — SIGNIFICANT CHANGE UP (ref 22–31)
CREAT SERPL-MCNC: 5.67 MG/DL — HIGH (ref 0.5–1.3)
CREAT SERPL-MCNC: 5.67 MG/DL — HIGH (ref 0.5–1.3)
EGFR: 8 ML/MIN/1.73M2 — LOW
EGFR: 8 ML/MIN/1.73M2 — LOW
GLUCOSE SERPL-MCNC: 92 MG/DL — SIGNIFICANT CHANGE UP (ref 70–99)
GLUCOSE SERPL-MCNC: 92 MG/DL — SIGNIFICANT CHANGE UP (ref 70–99)
HCT VFR BLD CALC: 23.4 % — LOW (ref 34.5–45)
HCT VFR BLD CALC: 23.4 % — LOW (ref 34.5–45)
HGB BLD-MCNC: 7.4 G/DL — LOW (ref 11.5–15.5)
HGB BLD-MCNC: 7.4 G/DL — LOW (ref 11.5–15.5)
MCHC RBC-ENTMCNC: 31 PG — SIGNIFICANT CHANGE UP (ref 27–34)
MCHC RBC-ENTMCNC: 31 PG — SIGNIFICANT CHANGE UP (ref 27–34)
MCHC RBC-ENTMCNC: 31.6 GM/DL — LOW (ref 32–36)
MCHC RBC-ENTMCNC: 31.6 GM/DL — LOW (ref 32–36)
MCV RBC AUTO: 97.9 FL — SIGNIFICANT CHANGE UP (ref 80–100)
MCV RBC AUTO: 97.9 FL — SIGNIFICANT CHANGE UP (ref 80–100)
NRBC # BLD: 3 /100 WBCS — HIGH (ref 0–0)
NRBC # BLD: 3 /100 WBCS — HIGH (ref 0–0)
PLATELET # BLD AUTO: 92 K/UL — LOW (ref 150–400)
PLATELET # BLD AUTO: 92 K/UL — LOW (ref 150–400)
POTASSIUM SERPL-MCNC: 3.8 MMOL/L — SIGNIFICANT CHANGE UP (ref 3.5–5.3)
POTASSIUM SERPL-MCNC: 3.8 MMOL/L — SIGNIFICANT CHANGE UP (ref 3.5–5.3)
POTASSIUM SERPL-SCNC: 3.8 MMOL/L — SIGNIFICANT CHANGE UP (ref 3.5–5.3)
POTASSIUM SERPL-SCNC: 3.8 MMOL/L — SIGNIFICANT CHANGE UP (ref 3.5–5.3)
PTH-INTACT FLD-MCNC: 89 PG/ML — HIGH (ref 15–65)
PTH-INTACT FLD-MCNC: 89 PG/ML — HIGH (ref 15–65)
RBC # BLD: 2.39 M/UL — LOW (ref 3.8–5.2)
RBC # BLD: 2.39 M/UL — LOW (ref 3.8–5.2)
RBC # FLD: 20.6 % — HIGH (ref 10.3–14.5)
RBC # FLD: 20.6 % — HIGH (ref 10.3–14.5)
SODIUM SERPL-SCNC: 136 MMOL/L — SIGNIFICANT CHANGE UP (ref 135–145)
SODIUM SERPL-SCNC: 136 MMOL/L — SIGNIFICANT CHANGE UP (ref 135–145)
VIT D25+D1,25 OH+D1,25 PNL SERPL-MCNC: 10.9 PG/ML — LOW (ref 19.9–79.3)
VIT D25+D1,25 OH+D1,25 PNL SERPL-MCNC: 10.9 PG/ML — LOW (ref 19.9–79.3)
WBC # BLD: 4.91 K/UL — SIGNIFICANT CHANGE UP (ref 3.8–10.5)
WBC # BLD: 4.91 K/UL — SIGNIFICANT CHANGE UP (ref 3.8–10.5)
WBC # FLD AUTO: 4.91 K/UL — SIGNIFICANT CHANGE UP (ref 3.8–10.5)
WBC # FLD AUTO: 4.91 K/UL — SIGNIFICANT CHANGE UP (ref 3.8–10.5)

## 2023-11-28 PROCEDURE — 99233 SBSQ HOSP IP/OBS HIGH 50: CPT | Mod: GC

## 2023-11-28 RX ORDER — DANAZOL 200 MG/1
200 CAPSULE ORAL THREE TIMES A DAY
Refills: 0 | Status: DISCONTINUED | OUTPATIENT
Start: 2023-11-28 | End: 2023-11-29

## 2023-11-28 RX ORDER — BENZOCAINE AND MENTHOL 5; 1 G/100ML; G/100ML
1 LIQUID ORAL ONCE
Refills: 0 | Status: COMPLETED | OUTPATIENT
Start: 2023-11-28 | End: 2023-11-28

## 2023-11-28 RX ORDER — ACETAMINOPHEN 500 MG
650 TABLET ORAL ONCE
Refills: 0 | Status: COMPLETED | OUTPATIENT
Start: 2023-11-28 | End: 2023-11-28

## 2023-11-28 RX ADMIN — Medication 667 MILLIGRAM(S): at 21:10

## 2023-11-28 RX ADMIN — BENZOCAINE AND MENTHOL 1 LOZENGE: 5; 1 LIQUID ORAL at 13:22

## 2023-11-28 RX ADMIN — Medication 200 MILLIGRAM(S): at 13:22

## 2023-11-28 RX ADMIN — Medication 650 MILLIGRAM(S): at 07:59

## 2023-11-28 RX ADMIN — Medication 650 MILLIGRAM(S): at 08:50

## 2023-11-28 RX ADMIN — Medication 667 MILLIGRAM(S): at 13:22

## 2023-11-28 RX ADMIN — Medication 200 MILLIGRAM(S): at 21:10

## 2023-11-28 RX ADMIN — Medication 667 MILLIGRAM(S): at 18:48

## 2023-11-28 RX ADMIN — Medication 667 MILLIGRAM(S): at 09:29

## 2023-11-28 RX ADMIN — DANAZOL 200 MILLIGRAM(S): 200 CAPSULE ORAL at 21:09

## 2023-11-28 RX ADMIN — FAMOTIDINE 20 MILLIGRAM(S): 10 INJECTION INTRAVENOUS at 11:27

## 2023-11-28 NOTE — DISCHARGE NOTE PROVIDER - NSDCMRMEDTOKEN_GEN_ALL_CORE_FT
calcium acetate 667 mg oral capsule: 1 cap(s) orally 4 times a day  danazol 200 mg oral capsule: 1 cap(s) orally 3 times a day  famotidine 20 mg oral tablet: 1 tab(s) orally once a day  Jakafi 20 mg oral tablet: 1 tab(s) orally 3 times a week after dialysis. Note: pt states 10mg as per vivo 20mg filled.  Carmen-Ben Rx oral tablet: 1 tab(s) orally once a day   calcium acetate 667 mg oral capsule: 1 cap(s) orally 4 times a day  danazol 200 mg oral capsule: 1 cap(s) orally 3 times a day  famotidine 20 mg oral tablet: 1 tab(s) orally once a day  Jakafi 10 mg oral tablet: 1 tab(s) orally 3 times a week after dialysis, as per hematology  Carmen-Ben Rx oral tablet: 1 tab(s) orally once a day

## 2023-11-28 NOTE — DISCHARGE NOTE PROVIDER - HOSPITAL COURSE
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   Patient  states that she was referred to ED for low Hemoglobin.   Patient had her blood work earlier this week, got the results 2 days ago and noted to be dizzy, weak and naiuseaus this morning. No hx chest pain/sob.   Patient takes Jakofi and Danazol for Polycythemia as per her Hematologist and the dose was increased recently.     No hx EGD/ Colonoscopy          (26 Nov 2023 19:47)    Hospital Course:  s/p 1 Unit PRBC   GI no plans for endoscopy, colonoscopy, or inpatient GI interventions given no active bleeding and more likely anemia due to potential underlying bone marrow process/ESRD    Heme Onc - Patient noted worsening anemia and more frequent transfusions since early this year. Likely multifactorial due to progression of myelofibrosis, ESRD and use of Jakafi. Peripheral smear showed mostly pale RBCs, tear drop cells and rare RBC fragments. Will workup anemia for active bleed vs hemolysis.   B12, folate wnl. Ferritin 1726. Hemolysis labs negative. Iron panel: iron sat % 61, total iron 86, TIBC 143, suggestive of ACD. Retic index shows hypoproliferation.   Hemoglobin 6.4-> 7.4 today, no transfusions overnight. Can resume home danazol dosage 600mg daily . Resume Jakafi at lower dose of 10mg three times a week (pt will need new prescription sent to her pharmacy)   hgb at goal, no contraindications for dc from heme standpoint           Important Medication Changes and Reason:  none    Active or Pending Issues Requiring Follow-up:  Heme     Advanced Directives:   [ x] Full code  [ ] DNR  [ ] Hospice    Discharge Diagnoses:  Anemia          HPI:  63 year old female with hx of Polycythemia vera  AMK 2 + since 2012, htn, ESRD on HD (M/W/F) via LUE AVF (2/2 chronic GN, 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   Patient  states that she was referred to ED for low Hemoglobin.   Patient had her blood work earlier this week, got the results 2 days ago and noted to be dizzy, weak and naiuseaus this morning. No hx chest pain/sob.   Patient takes Jakofi and Danazol for Polycythemia as per her Hematologist and the dose was increased recently.     No hx EGD/ Colonoscopy          (26 Nov 2023 19:47)    Hospital Course:  s/p 1 Unit PRBC   GI no plans for endoscopy, colonoscopy, or inpatient GI interventions given no active bleeding and more likely anemia due to potential underlying bone marrow process/ESRD    Heme Onc - Patient noted worsening anemia and more frequent transfusions since early this year. Likely multifactorial due to progression of myelofibrosis, ESRD and use of Jakafi. Peripheral smear showed mostly pale RBCs, tear drop cells and rare RBC fragments. Will workup anemia for active bleed vs hemolysis.   B12, folate wnl. Ferritin 1726. Hemolysis labs negative. Iron panel: iron sat % 61, total iron 86, TIBC 143, suggestive of ACD. Retic index shows hypoproliferation.   Hemoglobin 6.4-> 7.4 today, no transfusions overnight. Can resume home danazol dosage 600mg daily . 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   Patient  states that she was referred to ED for low Hemoglobin.   Patient had her blood work earlier this week, got the results 2 days ago and noted to be dizzy, weak and nauseous this morning. No hx chest pain/sob.   Patient takes Jakofi and Danazol for Polycythemia as per her Hematologist and the dose was increased recently.     No hx EGD/ Colonoscopy       Hospital Course:  s/p 1 Unit PRBC   GI no plans for endoscopy, colonoscopy, or inpatient GI interventions given no active bleeding and more likely anemia due to potential underlying bone marrow process/ESRD    Heme Onc - Patient noted worsening anemia and more frequent transfusions since early this year. Likely multifactorial due to progression of myelofibrosis, ESRD and use of Jakafi. Peripheral smear showed mostly pale RBCs, tear drop cells and rare RBC fragments. Will workup anemia for active bleed vs hemolysis.   B12, folate wnl. Ferritin 1726. Hemolysis labs negative. Iron panel: iron sat % 61, total iron 86, TIBC 143, suggestive of ACD. Retic index shows hypoproliferation.   Hemoglobin 6.4-> 7.4 today, no transfusions overnight. Can resume home danazol dosage 600mg daily . 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   Patient  states that she was referred to ED for low Hemoglobin.   Patient had her blood work earlier this week, got the results 2 days ago and noted to be dizzy, weak and nauseous this morning. No hx chest pain/sob.   Patient takes Jakofi and Danazol for Polycythemia as per her Hematologist and the dose was increased recently.     No hx EGD/ Colonoscopy       Hospital Course:  s/p 1 Unit PRBC   GI no plans for endoscopy, colonoscopy, or inpatient GI interventions given no active bleeding and more likely anemia due to potential underlying bone marrow process/ESRD    Heme Onc - Patient noted worsening anemia and more frequent transfusions since early this year. Likely multifactorial due to progression of myelofibrosis, ESRD and use of Jakafi. Peripheral smear showed mostly pale RBCs, tear drop cells and rare RBC fragments. Will workup anemia for active bleed vs hemolysis.   B12, folate wnl. Ferritin 1726. Hemolysis labs negative. Iron panel: iron sat % 61, total iron 86, TIBC 143, suggestive of ACD. Retic index shows hypoproliferation.   Hemoglobin 6.4-> 7.4 today, no transfusions overnight. Can resume home danazol dosage 600mg daily . Resume Jakafi at lower dose of 10mg three times a week (pt will need new prescription sent to her pharmacy)   hgb at goal, no contraindications for dc from heme standpoint     Patient medically cleared for discharge, by Dr. Rodriguez, with PCP, Nephrology, and Hematology follow up.        Important Medication Changes and Reason:  none    Active or Pending Issues Requiring Follow-up:  Heme     Advanced Directives:   [ x] Full code  [ ] DNR  [ ] Hospice    Discharge Diagnoses:  Anemia

## 2023-11-28 NOTE — PROGRESS NOTE ADULT - ASSESSMENT
This is a 63 year old female with hx of Polycythemia vera MAK 2+ (2012) w/ secondary myelofibrosis, ESRD on HD (M/W/F) via LUE AVF (2/2 chronic GN, started Dec 2017), splenic vein thrombosis (2015), who presented to the ED for symptomatic anemia s/p 1u pRBC. Heme consulted for management of anemia.     #Polycythemia vera w/ secondary myelofibrosis  #ESRD on HD   #Splenomegaly   Patient w/ polycythemia vera JAK2+ diagnosed in 2012, w/ secondary myelofibrosis seen on bone marrow biopsy in 2021. Presented w/ Hgb 6.4, s/p 1u pRBC but did not correct appropriately (repeat hgb 6.4). Patient noted worsening anemia and more frequent transfusions since early this year. Likely multifactorial due to progression of myelofibrosis, ESRD and use of Jakafi. Peripheral smear showed mostly pale RBCs, tear drop cells and rare RBC fragments. Will workup anemia for active bleed vs hemolysis.   B12, folate wnl. Ferritin 1726. Hemolysis labs negative. Iron panel (total iron wnl, low TIBC) suggestive of ACD. Retic index shows hypoproliferation. Hemoglobin 6.4-> 7.4 today, no transfusions overnight.     ** note incomplete until attending signature/attestation** This is a 63 year old female with hx of Polycythemia vera MAK 2+ (2012) w/ secondary myelofibrosis, ESRD on HD (M/W/F) via LUE AVF (2/2 chronic GN, started Dec 2017), splenic vein thrombosis (2015), who presented to the ED for symptomatic anemia s/p 1u pRBC. Heme consulted for management of anemia.     #Polycythemia vera w/ secondary myelofibrosis  #ESRD on HD   #Splenomegaly   Patient w/ polycythemia vera JAK2+ diagnosed in 2012, w/ secondary myelofibrosis seen on bone marrow biopsy in 2021. Presented w/ Hgb 6.4, s/p 1u pRBC but did not correct appropriately (repeat hgb 6.4). Patient noted worsening anemia and more frequent transfusions since early this year. Likely multifactorial due to progression of myelofibrosis, ESRD and use of Jakafi. Peripheral smear showed mostly pale RBCs, tear drop cells and rare RBC fragments. Will workup anemia for active bleed vs hemolysis.   B12, folate wnl. Ferritin 1726. Hemolysis labs negative. Iron panel: iron sat % 61, total iron 86, TIBC 143, suggestive of ACD. Retic index shows hypoproliferation.   Hemoglobin 6.4-> 7.4 today, no transfusions overnight.     ** note incomplete until attending signature/attestation** This is a 63 year old female with hx of Polycythemia vera MAK 2+ (2012) w/ secondary myelofibrosis, ESRD on HD (M/W/F) via LUE AVF (2/2 chronic GN, started Dec 2017), splenic vein thrombosis (2015), who presented to the ED for symptomatic anemia s/p 1u pRBC. Heme consulted for management of anemia.     #Polycythemia vera w/ secondary myelofibrosis  #ESRD on HD   #Splenomegaly   Patient w/ polycythemia vera JAK2+ diagnosed in 2012, w/ secondary myelofibrosis seen on bone marrow biopsy in 2021. Presented w/ Hgb 6.4, s/p 1u pRBC but did not correct appropriately (repeat hgb 6.4). Patient noted worsening anemia and more frequent transfusions since early this year. Likely multifactorial due to progression of myelofibrosis, ESRD and use of Jakafi. Peripheral smear showed mostly pale RBCs, tear drop cells and rare RBC fragments. Will workup anemia for active bleed vs hemolysis.   B12, folate wnl. Ferritin 1726. Hemolysis labs negative. Iron panel: iron sat % 61, total iron 86, TIBC 143, suggestive of ACD. Retic index shows hypoproliferation.   Hemoglobin 6.4-> 7.4 today, no transfusions overnight.   - can resume home danazol dosage 600mg daily   - resume Jakafi at lower dose of 10mg three times a week (pt will need new prescription sent to her pharmacy)   - hgb at goal, no contraindications for dc from heme standpoint       ** note incomplete until attending signature/attestation** This is a 63 year old female with hx of Polycythemia vera MAK 2+ (2012) w/ secondary myelofibrosis, ESRD on HD (M/W/F) via LUE AVF (2/2 chronic GN, started Dec 2017), splenic vein thrombosis (2015), who presented to the ED for symptomatic anemia s/p 1u pRBC. Heme consulted for management of anemia.     #Polycythemia vera w/ secondary myelofibrosis  #ESRD on HD   #Splenomegaly   Patient w/ polycythemia vera JAK2+ diagnosed in 2012, w/ secondary myelofibrosis seen on bone marrow biopsy in 2021. Presented w/ Hgb 6.4, s/p 1u pRBC but did not correct appropriately (repeat hgb 6.4). Patient noted worsening anemia and more frequent transfusions since early this year. Likely multifactorial due to progression of myelofibrosis, ESRD and use of Jakafi. Peripheral smear showed mostly pale RBCs, tear drop cells and rare RBC fragments. Will workup anemia for active bleed vs hemolysis.   B12, folate wnl. Ferritin 1726. Hemolysis labs negative. Iron panel: iron sat % 61, total iron 86, TIBC 143, suggestive of ACD. Retic index shows hypoproliferation.   Hemoglobin 6.4-> 7.4 today, no transfusions overnight.   - can resume home danazol dosage 600mg daily   - resume Jakafi at lower dose of 10mg three times a week (pt will need new prescription sent to her pharmacy)   - hgb at goal, no contraindications for dc from heme standpoint and f/u with Dr. Guo outpatient       ** note incomplete until attending signature/attestation** This is a 63 year old female with hx of Polycythemia vera MAK 2+ (2012) w/ secondary myelofibrosis, ESRD on HD (M/W/F) via LUE AVF (2/2 chronic GN, started Dec 2017), splenic vein thrombosis (2015), who presented to the ED for symptomatic anemia s/p 1u pRBC. Heme consulted for management of anemia.     #Polycythemia vera w/ secondary myelofibrosis  #ESRD on HD   #Splenomegaly     Patient w/ polycythemia vera JAK2+ diagnosed in 2012, w/ secondary myelofibrosis seen on bone marrow biopsy in 2021. Presented w/ Hgb 6.4, s/p 1u pRBC. Patient noted worsening anemia and more frequent transfusions since early this year. Likely multifactorial due to progression of myelofibrosis, ESRD and use of Jakafi. Peripheral smear showed mostly pale RBCs, tear drop cells and rare RBC fragments. Will workup anemia for active bleed vs hemolysis.   B12, folate wnl. Ferritin 1726. Hemolysis labs negative. Iron panel: iron sat % 61, total iron 86, TIBC 143, suggestive of ACD. Retic index shows hypoproliferation.     Hemoglobin 6.4-> 7.4 today, no transfusions overnight, indicating appropriate response to transfusion.   - Patient can resume home danazol dosage 600mg daily   - She can resume Jakafi at lower dose of 10mg three times a week. Please send new prescription to her mail-order pharmacy.   - Hgb is currently at goal. No contraindications for dc from heme standpoint. Given that PV with myelofibrosis and ESRD are underlying causes for her anemia, she will need close f/u with Dr. Guo as outpatient.     ** note incomplete until attending signature/attestation**

## 2023-11-28 NOTE — DISCHARGE NOTE PROVIDER - NSDCCPCAREPLAN_GEN_ALL_CORE_FT
PRINCIPAL DISCHARGE DIAGNOSIS  Diagnosis: Anemia secondary to renal failure  Assessment and Plan of Treatment: s/p 1 Unit PRBC   followed by GI with  no plans for endoscopy, colonoscopy, or inpatient GI interventions given no active bleeding and more likely anemia due to potential underlying bone marrow process/ESRD  followed by Heme Onc - noted worsening anemia and more frequent transfusions since early this year. Likely multifactorial due to progression of myelofibrosis, ESRD and use of Jakafi.   Can resume home danazol dosage 600mg daily .   Resume Jakafi at lower dose of 10mg three times a week      SECONDARY DISCHARGE DIAGNOSES  Diagnosis: ESRD on dialysis  Assessment and Plan of Treatment: Avoid taking (NSAIDs) - (ex: Ibuprofen, Advil, Celebrex, Naprosyn)  Avoid taking any nephrotoxic agents (can harm kidneys) - Intravenous contrast for diagnostic testing, combination cold medications.  Have all medications adjusted for your renal function by your Health Care Provider.  Blood pressure control is important.  Take all medication as prescribed.       PRINCIPAL DISCHARGE DIAGNOSIS  Diagnosis: Anemia secondary to renal failure  Assessment and Plan of Treatment: s/p 1 Unit PRBC   followed by GI with  no plans for endoscopy, colonoscopy, or inpatient GI interventions given no active bleeding and more likely anemia due to potential underlying bone marrow process/ESRD  followed by Heme Onc - noted worsening anemia and more frequent transfusions since early this year. Likely multifactorial due to progression of myelofibrosis, ESRD and use of Jakafi.   Can resume home danazol dosage 600mg daily .   Resume Jakafi at lower dose of 10mg three times a week  Follow up with your Hematologist upon discharge.      SECONDARY DISCHARGE DIAGNOSES  Diagnosis: ESRD on dialysis  Assessment and Plan of Treatment: Avoid taking (NSAIDs) - (ex: Ibuprofen, Advil, Celebrex, Naprosyn)  Avoid taking any nephrotoxic agents (can harm kidneys) - Intravenous contrast for diagnostic testing, combination cold medications.  Have all medications adjusted for your renal function by your Health Care Provider.  Blood pressure control is important.  Take all medication as prescribed.  Follow up with your nephrologist within one week.  Continue with your dialysis 3 times per week as previously scheduled.

## 2023-11-28 NOTE — DISCHARGE NOTE NURSING/CASE MANAGEMENT/SOCIAL WORK - NSDCPEFALRISK_GEN_ALL_CORE
For information on Fall & Injury Prevention, visit: [email or web address] OR  [email or web address] OR  https://www.cdc.gov/steadi/patient.html

## 2023-11-28 NOTE — PROGRESS NOTE ADULT - ASSESSMENT
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

## 2023-11-28 NOTE — DISCHARGE NOTE NURSING/CASE MANAGEMENT/SOCIAL WORK - PATIENT PORTAL LINK FT
You can access the FollowMyHealth Patient Portal offered by [facility] by registering at the following website: [email or web address] By joining [facility]’s FollowMyHealth portal, you will also be able to view your health information using other applications (apps) compatible with our system.

## 2023-11-28 NOTE — DISCHARGE NOTE PROVIDER - CARE PROVIDER_API CALL
Renata Rodriguez)  Internal Medicine  [address] [place] [id number]  Phone: [phone]  Fax: [phone]  Follow Up Time:    Renata Rodriguez)  Internal Medicine  [address] [place] [id number]  Phone: [phone]  Fax: [phone]  Follow Up Time:    Renata Rodriguez)  Internal Medicine  [address] [place] [id number]  Phone: [phone]  Fax: [phone]  Follow Up Time:

## 2023-11-28 NOTE — DISCHARGE NOTE PROVIDER - NSDCFUADDAPPT_GEN_ALL_CORE_FT
You must follow up with your primary medical doctor within 2-3 days of discharge - please call to make an appointment.  At this appointment, you will need a CBC drawn to monitor your hemoglobin/hematocrit.  If you do not have one, you can follow up at the [facility] - call [phone] to make an appointment.    You must follow up with your Hematologist, Dr. Guo, within one week of discharge - please call to make an appointment.    You must follow up with your nephrologist within one week of discharge - please call to make an appointment.    You must follow up with your gastroenterologist within 1-2 weeks of discharge - please call to make an appointment.          APPTS ARE READY TO BE MADE: [ X] YES    Best Family or Patient Contact (if needed):    Additional Information about above appointments (if needed):    1: Primary medical doctor  2: Hematologist  3: Nephrologist    Other comments or requests:    You must follow up with your primary medical doctor within 2-3 days of discharge - please call to make an appointment.  At this appointment, you will need a CBC drawn to monitor your hemoglobin/hematocrit.  If you do not have one, you can follow up at the [facility] - call [phone] to make an appointment.    You must follow up with your Hematologist, Dr. Guo, within one week of discharge - please call to make an appointment.    You must follow up with your nephrologist within one week of discharge - please call to make an appointment.    You must follow up with your gastroenterologist within 1-2 weeks of discharge - please call to make an appointment.          APPTS ARE READY TO BE MADE: [ X] YES    Best Family or Patient Contact (if needed):    Additional Information about above appointments (if needed):    1: Primary medical doctor  2: Hematologist  3: Nephrologist    Other comments or requests:    You must follow up with your primary medical doctor within 2-3 days of discharge - please call to make an appointment.  At this appointment, you will need a CBC drawn to monitor your hemoglobin/hematocrit.  If you do not have one, you can follow up at the [facility] - call [phone] to make an appointment.    You must follow up with your Hematologist, Dr. Guo, within one week of discharge - please call to make an appointment.    You must follow up with your nephrologist within one week of discharge - please call to make an appointment.    You must follow up with your gastroenterologist within 1-2 weeks of discharge - please call to make an appointment.          APPTS ARE READY TO BE MADE: [ X] YES    Best Family or Patient Contact (if needed):    Additional Information about above appointments (if needed):    1: Primary medical doctor  2: Hematologist  3: Nephrologist    Other comments or requests:    You must follow up with your primary medical doctor within 2-3 days of discharge - please call to make an appointment.  At this appointment, you will need a CBC drawn to monitor your hemoglobin/hematocrit.  If you do not have one, you can follow up at the [facility] - call [phone] to make an appointment.    You must follow up with your Hematologist, Dr. Guo, within one week of discharge - please call to make an appointment.    You must follow up with your nephrologist within one week of discharge - please call to make an appointment.    You must follow up with your gastroenterologist within 1-2 weeks of discharge - please call to make an appointment.          APPTS ARE READY TO BE MADE: [ X] YES    Best Family or Patient Contact (if needed):    Additional Information about above appointments (if needed):    1: Primary medical doctor  2: Hematologist  3: Nephrologist    Other comments or requests:     Patient was scheduled for an appointment on 12/14/2023 2:40pm at [address] [place] [address] with Dr. Wenceslao Benitez. Patient/Caregiver was advised of appointment details.     You must follow up with your primary medical doctor within 2-3 days of discharge - please call to make an appointment.  At this appointment, you will need a CBC drawn to monitor your hemoglobin/hematocrit.  If you do not have one, you can follow up at the [facility] - call [phone] to make an appointment.    You must follow up with your Hematologist, Dr. Guo, within one week of discharge - please call to make an appointment.    You must follow up with your nephrologist within one week of discharge - please call to make an appointment.    You must follow up with your gastroenterologist within 1-2 weeks of discharge - please call to make an appointment.          APPTS ARE READY TO BE MADE: [ X] YES    Best Family or Patient Contact (if needed):    Additional Information about above appointments (if needed):    1: Primary medical doctor  2: Hematologist  3: Nephrologist    Other comments or requests:     Patient was scheduled for an appointment on 12/14/2023 2:40pm at [address] [place] [address] with Dr. Wenceslao Benitez. Patient/Caregiver was advised of appointment details.     You must follow up with your primary medical doctor within 2-3 days of discharge - please call to make an appointment.  At this appointment, you will need a CBC drawn to monitor your hemoglobin/hematocrit.  If you do not have one, you can follow up at the [facility] - call [phone] to make an appointment.    You must follow up with your Hematologist, Dr. Guo, within one week of discharge - please call to make an appointment.    You must follow up with your nephrologist within one week of discharge - please call to make an appointment.    You must follow up with your gastroenterologist within 1-2 weeks of discharge - please call to make an appointment.          APPTS ARE READY TO BE MADE: [ X] YES    Best Family or Patient Contact (if needed):    Additional Information about above appointments (if needed):    1: Primary medical doctor  2: Hematologist  3: Nephrologist    Other comments or requests:     Patient was scheduled for an appointment on 12/14/2023 2:40pm at [address] [place] [address] with Dr. Wenceslao Benitez. Patient/Caregiver was advised of appointment details.

## 2023-11-28 NOTE — PROGRESS NOTE ADULT - SUBJECTIVE AND OBJECTIVE BOX
Patient is a 63y old  Female who presents with a chief complaint of Referred to ED for abnormal blood work (28 Nov 2023 16:20)      SUBJECTIVE / OVERNIGHT EVENTS: no events     MEDICATIONS  (STANDING):  calcium acetate 667 milliGRAM(s) Oral four times a day with meals  danazol 200 milliGRAM(s) Oral three times a day  famotidine    Tablet 20 milliGRAM(s) Oral daily  influenza   Vaccine 0.5 milliLiter(s) IntraMuscular once    MEDICATIONS  (PRN):  guaiFENesin Oral Liquid (Sugar-Free) 200 milliGRAM(s) Oral every 6 hours PRN Cough      CAPILLARY BLOOD GLUCOSE        I&O's Summary    27 Nov 2023 07:01  -  28 Nov 2023 07:00  --------------------------------------------------------  IN: 120 mL / OUT: 1000 mL / NET: -880 mL      T(C): 36.7 (11-28-23 @ 11:46), Max: 36.7 (11-28-23 @ 11:46)  HR: 78 (11-28-23 @ 11:46) (78 - 78)  BP: 122/73 (11-28-23 @ 11:46) (122/73 - 122/73)  RR: 18 (11-28-23 @ 11:46) (18 - 18)  SpO2: 98% (11-28-23 @ 11:46) (98% - 98%)    PHYSICAL EXAM:  GENERAL: NAD, well-developed  HEAD:  Atraumatic, Normocephalic  EYES: EOMI, PERRLA, conjunctiva and sclera clear  NECK: Supple, No JVD  CHEST/LUNG: Clear to auscultation bilaterally; No wheeze  HEART: Regular rate and rhythm; No murmurs, rubs, or gallops  ABDOMEN: Soft, Nontender, Nondistended; Bowel sounds present  EXTREMITIES:  2+ Peripheral Pulses, No clubbing, cyanosis, or edema  PSYCH: AAOx3  NEUROLOGY: non-focal  SKIN: No rashes or lesions    LABS:                        7.4    4.91  )-----------( 92       ( 28 Nov 2023 06:56 )             23.4     11-28    136  |  97  |  22  ----------------------------<  92  3.8   |  28  |  5.67<H>    Ca    9.3      28 Nov 2023 06:56            Urinalysis Basic - ( 28 Nov 2023 06:56 )    Color: x / Appearance: x / SG: x / pH: x  Gluc: 92 mg/dL / Ketone: x  / Bili: x / Urobili: x   Blood: x / Protein: x / Nitrite: x   Leuk Esterase: x / RBC: x / WBC x   Sq Epi: x / Non Sq Epi: x / Bacteria: x        RADIOLOGY & ADDITIONAL TESTS:    Imaging Personally Reviewed:    Consultant(s) Notes Reviewed:      Care Discussed with Consultants/Other Providers:

## 2023-11-28 NOTE — PROGRESS NOTE ADULT - SUBJECTIVE AND OBJECTIVE BOX
INTERVAL HPI/OVERNIGHT EVENTS:  Patient seen at bedside this afternoon    VITAL SIGNS:  T(F): 98 (11-28-23 @ 11:46)  HR: 78 (11-28-23 @ 11:46)  BP: 122/73 (11-28-23 @ 11:46)  RR: 18 (11-28-23 @ 11:46)  SpO2: 98% (11-28-23 @ 11:46)  Wt(kg): --    PHYSICAL EXAM:  Constitutional: NAD  Eyes: EOMI, sclera non-icteric  Neck: supple, no masses, no JVD  Respiratory: CTA b/l, good air entry b/l  Cardiovascular: RRR, no M/R/G  Gastrointestinal: soft, NTND, no masses palpable, + BS, no hepatosplenomegaly  Extremities: no c/c/e  Neurological: AAOx3    MEDICATIONS  (STANDING):  calcium acetate 667 milliGRAM(s) Oral four times a day with meals  famotidine    Tablet 20 milliGRAM(s) Oral daily  influenza   Vaccine 0.5 milliLiter(s) IntraMuscular once    MEDICATIONS  (PRN):    Allergies  No Known Allergies  Intolerances    LABS:                        7.4    4.91  )-----------( 92       ( 28 Nov 2023 06:56 )             23.4     11-28    136  |  97  |  22  ----------------------------<  92  3.8   |  28  |  5.67<H>    Ca    9.3      28 Nov 2023 06:56    TPro  7.8  /  Alb  4.3  /  TBili  1.2  /  DBili  x   /  AST  15  /  ALT  14  /  AlkPhos  77  11-26    PT/INR - ( 26 Nov 2023 14:19 )   PT: 12.0 sec;   INR: 1.15 ratio         PTT - ( 26 Nov 2023 14:19 )  PTT:28.4 sec  Urinalysis Basic - ( 28 Nov 2023 06:56 )    Color: x / Appearance: x / SG: x / pH: x  Gluc: 92 mg/dL / Ketone: x  / Bili: x / Urobili: x   Blood: x / Protein: x / Nitrite: x   Leuk Esterase: x / RBC: x / WBC x   Sq Epi: x / Non Sq Epi: x / Bacteria: x    RADIOLOGY & ADDITIONAL TESTS:  Studies reviewed.       INTERVAL HPI/OVERNIGHT EVENTS:  Patient seen at bedside this afternoon. Denied any fever, chills, lightheadedness, dizziness or ab pain.     VITAL SIGNS:  T(F): 98 (11-28-23 @ 11:46)  HR: 78 (11-28-23 @ 11:46)  BP: 122/73 (11-28-23 @ 11:46)  RR: 18 (11-28-23 @ 11:46)  SpO2: 98% (11-28-23 @ 11:46)  Wt(kg): --    PHYSICAL EXAM:  Constitutional: NAD  Eyes: EOMI, sclera non-icteric  Neck: supple, no masses, no JVD  Respiratory: CTA b/l, good air entry b/l  Cardiovascular: RRR, no M/R/G  Gastrointestinal: soft, NTND, no masses palpable, + BS, splenomegaly  Extremities: no c/c/e  Neurological: AAOx3    MEDICATIONS  (STANDING):  calcium acetate 667 milliGRAM(s) Oral four times a day with meals  famotidine    Tablet 20 milliGRAM(s) Oral daily  influenza   Vaccine 0.5 milliLiter(s) IntraMuscular once    MEDICATIONS  (PRN):    Allergies  No Known Allergies  Intolerances    LABS:                        7.4    4.91  )-----------( 92       ( 28 Nov 2023 06:56 )             23.4     11-28    136  |  97  |  22  ----------------------------<  92  3.8   |  28  |  5.67<H>    Ca    9.3      28 Nov 2023 06:56    TPro  7.8  /  Alb  4.3  /  TBili  1.2  /  DBili  x   /  AST  15  /  ALT  14  /  AlkPhos  77  11-26    PT/INR - ( 26 Nov 2023 14:19 )   PT: 12.0 sec;   INR: 1.15 ratio    PTT - ( 26 Nov 2023 14:19 )  PTT:28.4 sec  Ferritin: 1726 ng/mL (11.27.23 @ 13:57)   Folate, Serum: >20.0  Vitamin B12, Serum: 1243 pg/mL (11.27.23 @ 13:57)  Iron - Total Binding Capacity.: 143 ug/dL  % Saturation, Iron: 61 %  Iron Total: 86 ug/dL  Unsaturated Iron Binding Capacity: 56 ug/dL  Reticulocyte Count (11.27.23 @ 13:56)   RBC Count: 2.22 M/uL  Reticulocyte Percent: 3.5 %  Absolute Reticulocytes: 78.4 K/uLHaptoglobin, Serum (11.27.23 @ 13:56)   Haptoglobin, Serum: 36 mg/dLLactate Dehydrogenase, Serum (11.27.23 @ 13:56)   Lactate Dehydrogenase, Serum: 323 U/L       INTERVAL HPI/OVERNIGHT EVENTS:  Patient seen at bedside this afternoon. Denied any fever, chills, lightheadedness, dizziness or ab pain. Patient sometimes feels a moment of unsteadiness when sitting on the toilet.     VITAL SIGNS:  T(F): 98 (11-28-23 @ 11:46)  HR: 78 (11-28-23 @ 11:46)  BP: 122/73 (11-28-23 @ 11:46)  RR: 18 (11-28-23 @ 11:46)  SpO2: 98% (11-28-23 @ 11:46)  Wt(kg): --    PHYSICAL EXAM:  Constitutional: NAD  Eyes: EOMI, sclera non-icteric  Neck: supple, no masses, no JVD  Respiratory: CTA b/l, good air entry b/l  Cardiovascular: RRR, no M/R/G  Gastrointestinal: soft, NTND, no masses palpable, + BS, splenomegaly  Extremities: no c/c/e  Neurological: AAOx3    MEDICATIONS  (STANDING):  calcium acetate 667 milliGRAM(s) Oral four times a day with meals  famotidine    Tablet 20 milliGRAM(s) Oral daily  influenza   Vaccine 0.5 milliLiter(s) IntraMuscular once    MEDICATIONS  (PRN):    Allergies  No Known Allergies  Intolerances    LABS:                        7.4    4.91  )-----------( 92       ( 28 Nov 2023 06:56 )             23.4     11-28    136  |  97  |  22  ----------------------------<  92  3.8   |  28  |  5.67<H>    Ca    9.3      28 Nov 2023 06:56    TPro  7.8  /  Alb  4.3  /  TBili  1.2  /  DBili  x   /  AST  15  /  ALT  14  /  AlkPhos  77  11-26    PT/INR - ( 26 Nov 2023 14:19 )   PT: 12.0 sec;   INR: 1.15 ratio    PTT - ( 26 Nov 2023 14:19 )  PTT:28.4 sec  Ferritin: 1726 ng/mL (11.27.23 @ 13:57)   Folate, Serum: >20.0  Vitamin B12, Serum: 1243 pg/mL (11.27.23 @ 13:57)  Iron - Total Binding Capacity.: 143 ug/dL  % Saturation, Iron: 61 %  Iron Total: 86 ug/dL  Unsaturated Iron Binding Capacity: 56 ug/dL  Reticulocyte Count (11.27.23 @ 13:56)   RBC Count: 2.22 M/uL  Reticulocyte Percent: 3.5 %  Absolute Reticulocytes: 78.4 K/uLHaptoglobin, Serum (11.27.23 @ 13:56)   Haptoglobin, Serum: 36 mg/dLLactate Dehydrogenase, Serum (11.27.23 @ 13:56)   Lactate Dehydrogenase, Serum: 323 U/L

## 2023-11-29 VITALS
OXYGEN SATURATION: 98 % | DIASTOLIC BLOOD PRESSURE: 79 MMHG | RESPIRATION RATE: 18 BRPM | SYSTOLIC BLOOD PRESSURE: 129 MMHG | HEART RATE: 94 BPM

## 2023-11-29 LAB
ANION GAP SERPL CALC-SCNC: 17 MMOL/L — SIGNIFICANT CHANGE UP (ref 5–17)
ANION GAP SERPL CALC-SCNC: 17 MMOL/L — SIGNIFICANT CHANGE UP (ref 5–17)
BUN SERPL-MCNC: 34 MG/DL — HIGH (ref 7–23)
BUN SERPL-MCNC: 34 MG/DL — HIGH (ref 7–23)
CALCIUM SERPL-MCNC: 9.1 MG/DL — SIGNIFICANT CHANGE UP (ref 8.4–10.5)
CALCIUM SERPL-MCNC: 9.1 MG/DL — SIGNIFICANT CHANGE UP (ref 8.4–10.5)
CHLORIDE SERPL-SCNC: 99 MMOL/L — SIGNIFICANT CHANGE UP (ref 96–108)
CHLORIDE SERPL-SCNC: 99 MMOL/L — SIGNIFICANT CHANGE UP (ref 96–108)
CO2 SERPL-SCNC: 20 MMOL/L — LOW (ref 22–31)
CO2 SERPL-SCNC: 20 MMOL/L — LOW (ref 22–31)
CREAT SERPL-MCNC: 7.86 MG/DL — HIGH (ref 0.5–1.3)
CREAT SERPL-MCNC: 7.86 MG/DL — HIGH (ref 0.5–1.3)
EGFR: 5 ML/MIN/1.73M2 — LOW
EGFR: 5 ML/MIN/1.73M2 — LOW
GLUCOSE BLDC GLUCOMTR-MCNC: 185 MG/DL — HIGH (ref 70–99)
GLUCOSE BLDC GLUCOMTR-MCNC: 185 MG/DL — HIGH (ref 70–99)
GLUCOSE SERPL-MCNC: 65 MG/DL — LOW (ref 70–99)
GLUCOSE SERPL-MCNC: 65 MG/DL — LOW (ref 70–99)
HCT VFR BLD CALC: 23.5 % — LOW (ref 34.5–45)
HCT VFR BLD CALC: 23.5 % — LOW (ref 34.5–45)
HGB BLD-MCNC: 7.3 G/DL — LOW (ref 11.5–15.5)
HGB BLD-MCNC: 7.3 G/DL — LOW (ref 11.5–15.5)
MCHC RBC-ENTMCNC: 30.9 PG — SIGNIFICANT CHANGE UP (ref 27–34)
MCHC RBC-ENTMCNC: 30.9 PG — SIGNIFICANT CHANGE UP (ref 27–34)
MCHC RBC-ENTMCNC: 31.1 GM/DL — LOW (ref 32–36)
MCHC RBC-ENTMCNC: 31.1 GM/DL — LOW (ref 32–36)
MCV RBC AUTO: 99.6 FL — SIGNIFICANT CHANGE UP (ref 80–100)
MCV RBC AUTO: 99.6 FL — SIGNIFICANT CHANGE UP (ref 80–100)
NRBC # BLD: 4 /100 WBCS — HIGH (ref 0–0)
NRBC # BLD: 4 /100 WBCS — HIGH (ref 0–0)
PLATELET # BLD AUTO: 98 K/UL — LOW (ref 150–400)
PLATELET # BLD AUTO: 98 K/UL — LOW (ref 150–400)
POTASSIUM SERPL-MCNC: 4.9 MMOL/L — SIGNIFICANT CHANGE UP (ref 3.5–5.3)
POTASSIUM SERPL-MCNC: 4.9 MMOL/L — SIGNIFICANT CHANGE UP (ref 3.5–5.3)
POTASSIUM SERPL-SCNC: 4.9 MMOL/L — SIGNIFICANT CHANGE UP (ref 3.5–5.3)
POTASSIUM SERPL-SCNC: 4.9 MMOL/L — SIGNIFICANT CHANGE UP (ref 3.5–5.3)
RBC # BLD: 2.36 M/UL — LOW (ref 3.8–5.2)
RBC # BLD: 2.36 M/UL — LOW (ref 3.8–5.2)
RBC # FLD: 20.6 % — HIGH (ref 10.3–14.5)
RBC # FLD: 20.6 % — HIGH (ref 10.3–14.5)
SODIUM SERPL-SCNC: 136 MMOL/L — SIGNIFICANT CHANGE UP (ref 135–145)
SODIUM SERPL-SCNC: 136 MMOL/L — SIGNIFICANT CHANGE UP (ref 135–145)
WBC # BLD: 5.53 K/UL — SIGNIFICANT CHANGE UP (ref 3.8–10.5)
WBC # BLD: 5.53 K/UL — SIGNIFICANT CHANGE UP (ref 3.8–10.5)
WBC # FLD AUTO: 5.53 K/UL — SIGNIFICANT CHANGE UP (ref 3.8–10.5)
WBC # FLD AUTO: 5.53 K/UL — SIGNIFICANT CHANGE UP (ref 3.8–10.5)

## 2023-11-29 PROCEDURE — 90935 HEMODIALYSIS ONE EVALUATION: CPT | Mod: GC

## 2023-11-29 RX ORDER — RUXOLITINIB 25 MG/1
1 TABLET ORAL
Qty: 13 | Refills: 0
Start: 2023-11-29 | End: 2023-12-28

## 2023-11-29 RX ORDER — ONDANSETRON 8 MG/1
4 TABLET, FILM COATED ORAL ONCE
Refills: 0 | Status: COMPLETED | OUTPATIENT
Start: 2023-11-29 | End: 2023-11-29

## 2023-11-29 RX ADMIN — ONDANSETRON 4 MILLIGRAM(S): 8 TABLET, FILM COATED ORAL at 13:58

## 2023-11-29 RX ADMIN — FAMOTIDINE 20 MILLIGRAM(S): 10 INJECTION INTRAVENOUS at 13:00

## 2023-11-29 RX ADMIN — DANAZOL 200 MILLIGRAM(S): 200 CAPSULE ORAL at 13:01

## 2023-11-29 RX ADMIN — Medication 667 MILLIGRAM(S): at 13:00

## 2023-11-29 RX ADMIN — Medication 667 MILLIGRAM(S): at 09:23

## 2023-11-29 RX ADMIN — Medication 200 MILLIGRAM(S): at 05:05

## 2023-11-29 RX ADMIN — DANAZOL 200 MILLIGRAM(S): 200 CAPSULE ORAL at 05:05

## 2023-11-29 NOTE — PROGRESS NOTE ADULT - PROBLEM SELECTOR PLAN 2
Pt has anemia - no acute blood loss.   Pt has hx of ESRD. Pt has hx of Polycythemia vera on Jakafi.   blood transfusion as per the primary team.    PLAN:  Hem- onc follow ups.
Pt has anemia - no acute blood loss.   Pt has hx of ESRD. Pt has hx of Polycythemia vera on Jakafi.     PLAN:  Hem- onc follow ups.

## 2023-11-29 NOTE — PROGRESS NOTE ADULT - SUBJECTIVE AND OBJECTIVE BOX
[facility] Division of Kidney Diseases & Hypertension  FOLLOW UP NOTE  [phone]--------------------------------------------------------------------------------  Chief Complaint:Chronic kidney disease        24 hour events/subjective:  Pt was seen while getting HD today. No acute overnight events. No fresh complaints. Some dizziness in the morning and that has resolved spontaneously.  Pt denies SOB/ Constipation/ Diarrhea/ Nausea/ Vomiting/ abdominal pain/ chest pain/ tingling/ numbness.       PAST HISTORY  --------------------------------------------------------------------------------  No significant changes to PMH, PSH, FHx, SHx, unless otherwise noted    ALLERGIES & MEDICATIONS  --------------------------------------------------------------------------------  Allergies    No Known Allergies    Intolerances      Standing Inpatient Medications  calcium acetate 667 milliGRAM(s) Oral four times a day with meals  danazol 200 milliGRAM(s) Oral three times a day  famotidine    Tablet 20 milliGRAM(s) Oral daily  influenza   Vaccine 0.5 milliLiter(s) IntraMuscular once    PRN Inpatient Medications  guaiFENesin Oral Liquid (Sugar-Free) 200 milliGRAM(s) Oral every 6 hours PRN      REVIEW OF SYSTEMS  --------------------------------------------------------------------------------  as above.     VITALS/PHYSICAL EXAM  --------------------------------------------------------------------------------  T(C): 36.9 (11-29-23 @ 05:09), Max: 36.9 (11-29-23 @ 05:09)  HR: 77 (11-29-23 @ 05:09) (77 - 84)  BP: 121/69 (11-29-23 @ 05:09) (121/69 - 146/78)  RR: 18 (11-29-23 @ 05:09) (18 - 18)  SpO2: 96% (11-29-23 @ 05:09) (96% - 98%)  Wt(kg): --        11-28-23 @ 07:01  -  11-29-23 @ 07:00  --------------------------------------------------------  IN: 300 mL / OUT: 0 mL / NET: 300 mL      Physical Exam:  General: no acute distress  Neuro: no focal deficits  HEENT: NC/AT, anicteric, no JVD  Pulmonary: lungs CTA B/L  Cardiovascular/Chest: +S1S2, RRR  GI/Abdomen: soft, NT/ND, +bowel sounds  Extremities: No edema  Skin: Warm and dry  Vascular access:  LT AVF thrill +  LABS/STUDIES  --------------------------------------------------------------------------------              7.3    5.53  >-----------<  98       [11-29-23 @ 06:54]              23.5     136  |  99  |  34  ----------------------------<  65      [11-29-23 @ 07:04]  4.9   |  20  |  7.86        Ca     9.1     [11-29-23 @ 07:04]                [11-27-23 @ 13:56]    Creatinine Trend:  SCr 7.86 [11-29 @ 07:04]  SCr 5.67 [11-28 @ 06:56]  SCr 9.01 [11-27 @ 07:18]  SCr 8.01 [11-26 @ 14:19]    Urinalysis - [11-29-23 @ 07:04]      Color  / Appearance  / SG  / pH       Gluc 65 / Ketone   / Bili  / Urobili        Blood  / Protein  / Leuk Est  / Nitrite       RBC  / WBC  / Hyaline  / Gran  / Sq Epi  / Non Sq Epi  / Bacteria       Iron 86, TIBC 143, %sat 61      [11-27-23 @ 13:56]  Ferritin 1726      [11-27-23 @ 13:57]  PTH -- (Ca 8.9)      [11-28-23 @ 06:56]   89    HBsAb 7.0      [11-27-23 @ 13:57]  HBsAg Nonreact      [11-27-23 @ 13:56]  HBcAb Nonreact      [11-27-23 @ 13:57]  HCV 0.13, Nonreact      [11-27-23 @ 07:22]

## 2023-11-29 NOTE — PROGRESS NOTE ADULT - ATTENDING COMMENTS
For HD  PCV Hx    Now counts low  Adjust volume removal to optimize BP
For HD  PCV Hx    Now counts low  Adjust volume removal to optimize BP  K 4.9 , HCO3 20
This patient with myelofibrosis and renal failure requiring dialysis is seen stable one day post dialysis treatment. HGB 7.1 g/dL  Discussion of follow up with Dr RAN Guo if outpatient epoetin alpha will be offered or additional units of packed red cells.

## 2023-11-29 NOTE — PROGRESS NOTE ADULT - REASON FOR ADMISSION
Referred to ED for abnormal blood work

## 2023-11-29 NOTE — CHART NOTE - NSCHARTNOTEFT_GEN_A_CORE
Request from Dr. Rodriguez to facilitate patient discharge.  Medication reconciliation reviewed, revised, and resolved with Dr. Rodriguez, who has medically cleared patient for discharge with follow up as advised.  Please refer to discharge note for detailed hospital course.

## 2023-11-29 NOTE — PROGRESS NOTE ADULT - PROBLEM SELECTOR PLAN 1
ESRD on HD MWF:  Will schedule for HD on 11/27 as per her normal schedule. Will order for 1L UF if BP tolerates as she is getting 1u PRBC today. However, she reported she recently stopped a BP med because BP at end of HD started to become a bit soft so would adjust UF to maintain SBP>100 mmHg  Consent obtained.  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 Check daily renal panel. If phos and Ca not at goal, can check PTH and vitamin D levels but she is followed closely by Dr. De León and anticipate she will not be hospitalized very long.
ESRD on HD MWF:  HD today as per her normal schedule. Will order for 1L UF if BP tolerates. However, she reported she recently stopped a BP med because BP at end of HD started to become a bit soft so would adjust UF to maintain SBP>100 mmHg  Consent obtained and is in the charts already.   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 Check daily renal panel. If phos and Ca not at goal, can check PTH and vitamin D levels but she is followed closely by Dr. De León and anticipate she will not be hospitalized very long. Probable discharge today.

## 2023-11-29 NOTE — PROGRESS NOTE ADULT - ASSESSMENT
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 Nephrology service consulted for ESRD management. Hgb this admission 6.4. Labs otherwise unremarkable given her hx. Last outpatient HD was on 11/27, which she tolerated well. CXR showed atelectasis. Dry weight is 57-58kg per pt and she urinates about a cup a day.

## 2023-11-30 ENCOUNTER — APPOINTMENT (OUTPATIENT)
Dept: HEMATOLOGY ONCOLOGY | Facility: CLINIC | Age: 63
End: 2023-11-30
Payer: COMMERCIAL

## 2023-11-30 ENCOUNTER — RESULT REVIEW (OUTPATIENT)
Age: 63
End: 2023-11-30

## 2023-11-30 ENCOUNTER — OUTPATIENT (OUTPATIENT)
Dept: OUTPATIENT SERVICES | Facility: HOSPITAL | Age: 63
LOS: 1 days | End: 2023-11-30
Payer: MEDICAID

## 2023-11-30 ENCOUNTER — LABORATORY RESULT (OUTPATIENT)
Age: 63
End: 2023-11-30

## 2023-11-30 VITALS
DIASTOLIC BLOOD PRESSURE: 78 MMHG | BODY MASS INDEX: 23.66 KG/M2 | RESPIRATION RATE: 18 BRPM | SYSTOLIC BLOOD PRESSURE: 124 MMHG | WEIGHT: 128.6 LBS | HEART RATE: 75 BPM | OXYGEN SATURATION: 99 % | HEIGHT: 61.73 IN | TEMPERATURE: 97.9 F

## 2023-11-30 DIAGNOSIS — Z99.2 DEPENDENCE ON RENAL DIALYSIS: Chronic | ICD-10-CM

## 2023-11-30 DIAGNOSIS — D64.9 ANEMIA, UNSPECIFIED: ICD-10-CM

## 2023-11-30 DIAGNOSIS — I77.0 ARTERIOVENOUS FISTULA, ACQUIRED: Chronic | ICD-10-CM

## 2023-11-30 DIAGNOSIS — J40 BRONCHITIS, NOT SPECIFIED AS ACUTE OR CHRONIC: ICD-10-CM

## 2023-11-30 DIAGNOSIS — T85.898A OTHER SPECIFIED COMPLICATION OF OTHER INTERNAL PROSTHETIC DEVICES, IMPLANTS AND GRAFTS, INITIAL ENCOUNTER: Chronic | ICD-10-CM

## 2023-11-30 LAB
ANISOCYTOSIS BLD QL: SLIGHT — SIGNIFICANT CHANGE UP
ANISOCYTOSIS BLD QL: SLIGHT — SIGNIFICANT CHANGE UP
BASOPHILS # BLD AUTO: 0 K/UL — SIGNIFICANT CHANGE UP (ref 0–0.2)
BASOPHILS # BLD AUTO: 0 K/UL — SIGNIFICANT CHANGE UP (ref 0–0.2)
BASOPHILS NFR BLD AUTO: 0 % — SIGNIFICANT CHANGE UP (ref 0–2)
BASOPHILS NFR BLD AUTO: 0 % — SIGNIFICANT CHANGE UP (ref 0–2)
DACRYOCYTES BLD QL SMEAR: SLIGHT — SIGNIFICANT CHANGE UP
DACRYOCYTES BLD QL SMEAR: SLIGHT — SIGNIFICANT CHANGE UP
ELLIPTOCYTES BLD QL SMEAR: SLIGHT — SIGNIFICANT CHANGE UP
ELLIPTOCYTES BLD QL SMEAR: SLIGHT — SIGNIFICANT CHANGE UP
EOSINOPHIL # BLD AUTO: 0.23 K/UL — SIGNIFICANT CHANGE UP (ref 0–0.5)
EOSINOPHIL # BLD AUTO: 0.23 K/UL — SIGNIFICANT CHANGE UP (ref 0–0.5)
EOSINOPHIL NFR BLD AUTO: 4 % — SIGNIFICANT CHANGE UP (ref 0–6)
EOSINOPHIL NFR BLD AUTO: 4 % — SIGNIFICANT CHANGE UP (ref 0–6)
HCT VFR BLD CALC: 25.1 % — LOW (ref 34.5–45)
HCT VFR BLD CALC: 25.1 % — LOW (ref 34.5–45)
HGB BLD-MCNC: 7.8 G/DL — LOW (ref 11.5–15.5)
HGB BLD-MCNC: 7.8 G/DL — LOW (ref 11.5–15.5)
HYPOCHROMIA BLD QL: SLIGHT — SIGNIFICANT CHANGE UP
HYPOCHROMIA BLD QL: SLIGHT — SIGNIFICANT CHANGE UP
LYMPHOCYTES # BLD AUTO: 1.27 K/UL — SIGNIFICANT CHANGE UP (ref 1–3.3)
LYMPHOCYTES # BLD AUTO: 1.27 K/UL — SIGNIFICANT CHANGE UP (ref 1–3.3)
LYMPHOCYTES # BLD AUTO: 22 % — SIGNIFICANT CHANGE UP (ref 13–44)
LYMPHOCYTES # BLD AUTO: 22 % — SIGNIFICANT CHANGE UP (ref 13–44)
MCHC RBC-ENTMCNC: 30.7 PG — SIGNIFICANT CHANGE UP (ref 27–34)
MCHC RBC-ENTMCNC: 30.7 PG — SIGNIFICANT CHANGE UP (ref 27–34)
MCHC RBC-ENTMCNC: 31.1 G/DL — LOW (ref 32–36)
MCHC RBC-ENTMCNC: 31.1 G/DL — LOW (ref 32–36)
MCV RBC AUTO: 98.8 FL — SIGNIFICANT CHANGE UP (ref 80–100)
MCV RBC AUTO: 98.8 FL — SIGNIFICANT CHANGE UP (ref 80–100)
MONOCYTES # BLD AUTO: 0.29 K/UL — SIGNIFICANT CHANGE UP (ref 0–0.9)
MONOCYTES # BLD AUTO: 0.29 K/UL — SIGNIFICANT CHANGE UP (ref 0–0.9)
MONOCYTES NFR BLD AUTO: 5 % — SIGNIFICANT CHANGE UP (ref 2–14)
MONOCYTES NFR BLD AUTO: 5 % — SIGNIFICANT CHANGE UP (ref 2–14)
MYELOCYTES NFR BLD: 3 % — HIGH (ref 0–0)
MYELOCYTES NFR BLD: 3 % — HIGH (ref 0–0)
NEUTROPHILS # BLD AUTO: 3.81 K/UL — SIGNIFICANT CHANGE UP (ref 1.8–7.4)
NEUTROPHILS # BLD AUTO: 3.81 K/UL — SIGNIFICANT CHANGE UP (ref 1.8–7.4)
NEUTROPHILS NFR BLD AUTO: 66 % — SIGNIFICANT CHANGE UP (ref 43–77)
NEUTROPHILS NFR BLD AUTO: 66 % — SIGNIFICANT CHANGE UP (ref 43–77)
NRBC # BLD: 1 /100 — HIGH (ref 0–0)
NRBC # BLD: 1 /100 — HIGH (ref 0–0)
NRBC # BLD: SIGNIFICANT CHANGE UP /100 WBCS (ref 0–0)
NRBC # BLD: SIGNIFICANT CHANGE UP /100 WBCS (ref 0–0)
PLAT MORPH BLD: NORMAL — SIGNIFICANT CHANGE UP
PLAT MORPH BLD: NORMAL — SIGNIFICANT CHANGE UP
PLATELET # BLD AUTO: 118 K/UL — LOW (ref 150–400)
PLATELET # BLD AUTO: 118 K/UL — LOW (ref 150–400)
POIKILOCYTOSIS BLD QL AUTO: SLIGHT — SIGNIFICANT CHANGE UP
POIKILOCYTOSIS BLD QL AUTO: SLIGHT — SIGNIFICANT CHANGE UP
POLYCHROMASIA BLD QL SMEAR: SLIGHT — SIGNIFICANT CHANGE UP
POLYCHROMASIA BLD QL SMEAR: SLIGHT — SIGNIFICANT CHANGE UP
RBC # BLD: 2.54 M/UL — LOW (ref 3.8–5.2)
RBC # BLD: 2.54 M/UL — LOW (ref 3.8–5.2)
RBC # FLD: 20.6 % — HIGH (ref 10.3–14.5)
RBC # FLD: 20.6 % — HIGH (ref 10.3–14.5)
RBC BLD AUTO: ABNORMAL
RBC BLD AUTO: ABNORMAL
RETICS #: 152.7 K/UL — HIGH (ref 25–125)
RETICS #: 152.7 K/UL — HIGH (ref 25–125)
RETICS/RBC NFR: 6.1 % — HIGH (ref 0.5–2.5)
RETICS/RBC NFR: 6.1 % — HIGH (ref 0.5–2.5)
WBC # BLD: 5.77 K/UL — SIGNIFICANT CHANGE UP (ref 3.8–10.5)
WBC # BLD: 5.77 K/UL — SIGNIFICANT CHANGE UP (ref 3.8–10.5)
WBC # FLD AUTO: 5.77 K/UL — SIGNIFICANT CHANGE UP (ref 3.8–10.5)
WBC # FLD AUTO: 5.77 K/UL — SIGNIFICANT CHANGE UP (ref 3.8–10.5)

## 2023-11-30 PROCEDURE — 99214 OFFICE O/P EST MOD 30 MIN: CPT

## 2023-12-01 ENCOUNTER — APPOINTMENT (OUTPATIENT)
Dept: INFUSION THERAPY | Facility: HOSPITAL | Age: 63
End: 2023-12-01

## 2023-12-04 DIAGNOSIS — Z51.89 ENCOUNTER FOR OTHER SPECIFIED AFTERCARE: ICD-10-CM

## 2023-12-06 ENCOUNTER — EMERGENCY (EMERGENCY)
Facility: HOSPITAL | Age: 63
LOS: 1 days | Discharge: HOME | End: 2023-12-06
Attending: STUDENT IN AN ORGANIZED HEALTH CARE EDUCATION/TRAINING PROGRAM
Payer: MEDICAID

## 2023-12-06 VITALS
HEART RATE: 117 BPM | HEIGHT: 61.73 IN | OXYGEN SATURATION: 99 % | WEIGHT: 127.87 LBS | SYSTOLIC BLOOD PRESSURE: 142 MMHG | DIASTOLIC BLOOD PRESSURE: 84 MMHG | RESPIRATION RATE: 20 BRPM | TEMPERATURE: 99 F

## 2023-12-06 DIAGNOSIS — T85.898A OTHER SPECIFIED COMPLICATION OF OTHER INTERNAL PROSTHETIC DEVICES, IMPLANTS AND GRAFTS, INITIAL ENCOUNTER: Chronic | ICD-10-CM

## 2023-12-06 DIAGNOSIS — Z99.2 DEPENDENCE ON RENAL DIALYSIS: Chronic | ICD-10-CM

## 2023-12-06 DIAGNOSIS — I77.0 ARTERIOVENOUS FISTULA, ACQUIRED: Chronic | ICD-10-CM

## 2023-12-06 PROCEDURE — 99285 EMERGENCY DEPT VISIT HI MDM: CPT

## 2023-12-07 ENCOUNTER — APPOINTMENT (OUTPATIENT)
Dept: VASCULAR SURGERY | Facility: CLINIC | Age: 63
End: 2023-12-07

## 2023-12-07 VITALS
TEMPERATURE: 98 F | SYSTOLIC BLOOD PRESSURE: 98 MMHG | DIASTOLIC BLOOD PRESSURE: 65 MMHG | RESPIRATION RATE: 18 BRPM | HEART RATE: 86 BPM | OXYGEN SATURATION: 100 %

## 2023-12-07 LAB
ALBUMIN SERPL ELPH-MCNC: 4.7 G/DL — SIGNIFICANT CHANGE UP (ref 3.3–5)
ALBUMIN SERPL ELPH-MCNC: 4.7 G/DL — SIGNIFICANT CHANGE UP (ref 3.3–5)
ALP SERPL-CCNC: 76 U/L — SIGNIFICANT CHANGE UP (ref 40–120)
ALP SERPL-CCNC: 76 U/L — SIGNIFICANT CHANGE UP (ref 40–120)
ALT FLD-CCNC: 16 U/L — SIGNIFICANT CHANGE UP (ref 10–45)
ALT FLD-CCNC: 16 U/L — SIGNIFICANT CHANGE UP (ref 10–45)
ANION GAP SERPL CALC-SCNC: 18 MMOL/L — HIGH (ref 5–17)
ANION GAP SERPL CALC-SCNC: 18 MMOL/L — HIGH (ref 5–17)
ANISOCYTOSIS BLD QL: SIGNIFICANT CHANGE UP
ANISOCYTOSIS BLD QL: SIGNIFICANT CHANGE UP
AST SERPL-CCNC: 22 U/L — SIGNIFICANT CHANGE UP (ref 10–40)
AST SERPL-CCNC: 22 U/L — SIGNIFICANT CHANGE UP (ref 10–40)
BASE EXCESS BLDV CALC-SCNC: 7.4 MMOL/L — HIGH (ref -2–3)
BASE EXCESS BLDV CALC-SCNC: 7.4 MMOL/L — HIGH (ref -2–3)
BASOPHILS # BLD AUTO: 0.06 K/UL — SIGNIFICANT CHANGE UP (ref 0–0.2)
BASOPHILS # BLD AUTO: 0.06 K/UL — SIGNIFICANT CHANGE UP (ref 0–0.2)
BASOPHILS NFR BLD AUTO: 0.9 % — SIGNIFICANT CHANGE UP (ref 0–2)
BASOPHILS NFR BLD AUTO: 0.9 % — SIGNIFICANT CHANGE UP (ref 0–2)
BILIRUB SERPL-MCNC: 1.7 MG/DL — HIGH (ref 0.2–1.2)
BILIRUB SERPL-MCNC: 1.7 MG/DL — HIGH (ref 0.2–1.2)
BUN SERPL-MCNC: 20 MG/DL — SIGNIFICANT CHANGE UP (ref 7–23)
BUN SERPL-MCNC: 20 MG/DL — SIGNIFICANT CHANGE UP (ref 7–23)
CA-I SERPL-SCNC: 1.11 MMOL/L — LOW (ref 1.15–1.33)
CA-I SERPL-SCNC: 1.11 MMOL/L — LOW (ref 1.15–1.33)
CALCIUM SERPL-MCNC: 9.3 MG/DL — SIGNIFICANT CHANGE UP (ref 8.4–10.5)
CALCIUM SERPL-MCNC: 9.3 MG/DL — SIGNIFICANT CHANGE UP (ref 8.4–10.5)
CHLORIDE BLDV-SCNC: 99 MMOL/L — SIGNIFICANT CHANGE UP (ref 96–108)
CHLORIDE BLDV-SCNC: 99 MMOL/L — SIGNIFICANT CHANGE UP (ref 96–108)
CHLORIDE SERPL-SCNC: 94 MMOL/L — LOW (ref 96–108)
CHLORIDE SERPL-SCNC: 94 MMOL/L — LOW (ref 96–108)
CO2 BLDV-SCNC: 32 MMOL/L — HIGH (ref 22–26)
CO2 BLDV-SCNC: 32 MMOL/L — HIGH (ref 22–26)
CO2 SERPL-SCNC: 25 MMOL/L — SIGNIFICANT CHANGE UP (ref 22–31)
CO2 SERPL-SCNC: 25 MMOL/L — SIGNIFICANT CHANGE UP (ref 22–31)
CREAT SERPL-MCNC: 4.96 MG/DL — HIGH (ref 0.5–1.3)
CREAT SERPL-MCNC: 4.96 MG/DL — HIGH (ref 0.5–1.3)
DACRYOCYTES BLD QL SMEAR: SLIGHT — SIGNIFICANT CHANGE UP
DACRYOCYTES BLD QL SMEAR: SLIGHT — SIGNIFICANT CHANGE UP
EGFR: 9 ML/MIN/1.73M2 — LOW
EGFR: 9 ML/MIN/1.73M2 — LOW
ELLIPTOCYTES BLD QL SMEAR: SLIGHT — SIGNIFICANT CHANGE UP
ELLIPTOCYTES BLD QL SMEAR: SLIGHT — SIGNIFICANT CHANGE UP
EOSINOPHIL # BLD AUTO: 0.06 K/UL — SIGNIFICANT CHANGE UP (ref 0–0.5)
EOSINOPHIL # BLD AUTO: 0.06 K/UL — SIGNIFICANT CHANGE UP (ref 0–0.5)
EOSINOPHIL NFR BLD AUTO: 0.9 % — SIGNIFICANT CHANGE UP (ref 0–6)
EOSINOPHIL NFR BLD AUTO: 0.9 % — SIGNIFICANT CHANGE UP (ref 0–6)
GAS PNL BLDV: 131 MMOL/L — LOW (ref 136–145)
GAS PNL BLDV: 131 MMOL/L — LOW (ref 136–145)
GAS PNL BLDV: SIGNIFICANT CHANGE UP
GLUCOSE BLDV-MCNC: 99 MG/DL — SIGNIFICANT CHANGE UP (ref 70–99)
GLUCOSE BLDV-MCNC: 99 MG/DL — SIGNIFICANT CHANGE UP (ref 70–99)
GLUCOSE SERPL-MCNC: 96 MG/DL — SIGNIFICANT CHANGE UP (ref 70–99)
GLUCOSE SERPL-MCNC: 96 MG/DL — SIGNIFICANT CHANGE UP (ref 70–99)
HCO3 BLDV-SCNC: 31 MMOL/L — HIGH (ref 22–29)
HCO3 BLDV-SCNC: 31 MMOL/L — HIGH (ref 22–29)
HCT VFR BLD CALC: 28.4 % — LOW (ref 34.5–45)
HCT VFR BLD CALC: 28.4 % — LOW (ref 34.5–45)
HCT VFR BLDA CALC: 29 % — LOW (ref 34.5–46.5)
HCT VFR BLDA CALC: 29 % — LOW (ref 34.5–46.5)
HGB BLD CALC-MCNC: 9.5 G/DL — LOW (ref 11.7–16.1)
HGB BLD CALC-MCNC: 9.5 G/DL — LOW (ref 11.7–16.1)
HGB BLD-MCNC: 8.8 G/DL — LOW (ref 11.5–15.5)
HGB BLD-MCNC: 8.8 G/DL — LOW (ref 11.5–15.5)
HYPOCHROMIA BLD QL: SLIGHT — SIGNIFICANT CHANGE UP
HYPOCHROMIA BLD QL: SLIGHT — SIGNIFICANT CHANGE UP
LACTATE BLDV-MCNC: 0.8 MMOL/L — SIGNIFICANT CHANGE UP (ref 0.5–2)
LACTATE BLDV-MCNC: 0.8 MMOL/L — SIGNIFICANT CHANGE UP (ref 0.5–2)
LIDOCAIN IGE QN: 55 U/L — SIGNIFICANT CHANGE UP (ref 7–60)
LIDOCAIN IGE QN: 55 U/L — SIGNIFICANT CHANGE UP (ref 7–60)
LYMPHOCYTES # BLD AUTO: 0.35 K/UL — LOW (ref 1–3.3)
LYMPHOCYTES # BLD AUTO: 0.35 K/UL — LOW (ref 1–3.3)
LYMPHOCYTES # BLD AUTO: 5.3 % — LOW (ref 13–44)
LYMPHOCYTES # BLD AUTO: 5.3 % — LOW (ref 13–44)
MAGNESIUM SERPL-MCNC: 2.1 MG/DL — SIGNIFICANT CHANGE UP (ref 1.6–2.6)
MAGNESIUM SERPL-MCNC: 2.1 MG/DL — SIGNIFICANT CHANGE UP (ref 1.6–2.6)
MANUAL SMEAR VERIFICATION: SIGNIFICANT CHANGE UP
MANUAL SMEAR VERIFICATION: SIGNIFICANT CHANGE UP
MCHC RBC-ENTMCNC: 30 PG — SIGNIFICANT CHANGE UP (ref 27–34)
MCHC RBC-ENTMCNC: 30 PG — SIGNIFICANT CHANGE UP (ref 27–34)
MCHC RBC-ENTMCNC: 31 GM/DL — LOW (ref 32–36)
MCHC RBC-ENTMCNC: 31 GM/DL — LOW (ref 32–36)
MCV RBC AUTO: 96.9 FL — SIGNIFICANT CHANGE UP (ref 80–100)
MCV RBC AUTO: 96.9 FL — SIGNIFICANT CHANGE UP (ref 80–100)
METAMYELOCYTES # FLD: 0.9 % — HIGH (ref 0–0)
METAMYELOCYTES # FLD: 0.9 % — HIGH (ref 0–0)
MONOCYTES # BLD AUTO: 0.06 K/UL — SIGNIFICANT CHANGE UP (ref 0–0.9)
MONOCYTES # BLD AUTO: 0.06 K/UL — SIGNIFICANT CHANGE UP (ref 0–0.9)
MONOCYTES NFR BLD AUTO: 0.9 % — LOW (ref 2–14)
MONOCYTES NFR BLD AUTO: 0.9 % — LOW (ref 2–14)
NEUTROPHILS # BLD AUTO: 5.96 K/UL — SIGNIFICANT CHANGE UP (ref 1.8–7.4)
NEUTROPHILS # BLD AUTO: 5.96 K/UL — SIGNIFICANT CHANGE UP (ref 1.8–7.4)
NEUTROPHILS NFR BLD AUTO: 87.5 % — HIGH (ref 43–77)
NEUTROPHILS NFR BLD AUTO: 87.5 % — HIGH (ref 43–77)
NEUTS BAND # BLD: 3.6 % — SIGNIFICANT CHANGE UP (ref 0–8)
NEUTS BAND # BLD: 3.6 % — SIGNIFICANT CHANGE UP (ref 0–8)
NRBC # BLD: 2 /100 — HIGH (ref 0–0)
NRBC # BLD: 2 /100 — HIGH (ref 0–0)
NT-PROBNP SERPL-SCNC: 8843 PG/ML — HIGH (ref 0–300)
NT-PROBNP SERPL-SCNC: 8843 PG/ML — HIGH (ref 0–300)
OVALOCYTES BLD QL SMEAR: SLIGHT — SIGNIFICANT CHANGE UP
OVALOCYTES BLD QL SMEAR: SLIGHT — SIGNIFICANT CHANGE UP
PCO2 BLDV: 40 MMHG — SIGNIFICANT CHANGE UP (ref 39–42)
PCO2 BLDV: 40 MMHG — SIGNIFICANT CHANGE UP (ref 39–42)
PH BLDV: 7.5 — HIGH (ref 7.32–7.43)
PH BLDV: 7.5 — HIGH (ref 7.32–7.43)
PLAT MORPH BLD: NORMAL — SIGNIFICANT CHANGE UP
PLAT MORPH BLD: NORMAL — SIGNIFICANT CHANGE UP
PLATELET # BLD AUTO: 115 K/UL — LOW (ref 150–400)
PLATELET # BLD AUTO: 115 K/UL — LOW (ref 150–400)
PO2 BLDV: 56 MMHG — HIGH (ref 25–45)
PO2 BLDV: 56 MMHG — HIGH (ref 25–45)
POIKILOCYTOSIS BLD QL AUTO: SIGNIFICANT CHANGE UP
POIKILOCYTOSIS BLD QL AUTO: SIGNIFICANT CHANGE UP
POLYCHROMASIA BLD QL SMEAR: SLIGHT — SIGNIFICANT CHANGE UP
POLYCHROMASIA BLD QL SMEAR: SLIGHT — SIGNIFICANT CHANGE UP
POTASSIUM BLDV-SCNC: 3.6 MMOL/L — SIGNIFICANT CHANGE UP (ref 3.5–5.1)
POTASSIUM BLDV-SCNC: 3.6 MMOL/L — SIGNIFICANT CHANGE UP (ref 3.5–5.1)
POTASSIUM SERPL-MCNC: 3.9 MMOL/L — SIGNIFICANT CHANGE UP (ref 3.5–5.3)
POTASSIUM SERPL-MCNC: 3.9 MMOL/L — SIGNIFICANT CHANGE UP (ref 3.5–5.3)
POTASSIUM SERPL-SCNC: 3.9 MMOL/L — SIGNIFICANT CHANGE UP (ref 3.5–5.3)
POTASSIUM SERPL-SCNC: 3.9 MMOL/L — SIGNIFICANT CHANGE UP (ref 3.5–5.3)
PROT SERPL-MCNC: 8.1 G/DL — SIGNIFICANT CHANGE UP (ref 6–8.3)
PROT SERPL-MCNC: 8.1 G/DL — SIGNIFICANT CHANGE UP (ref 6–8.3)
RBC # BLD: 2.93 M/UL — LOW (ref 3.8–5.2)
RBC # BLD: 2.93 M/UL — LOW (ref 3.8–5.2)
RBC # FLD: 20.4 % — HIGH (ref 10.3–14.5)
RBC # FLD: 20.4 % — HIGH (ref 10.3–14.5)
RBC BLD AUTO: ABNORMAL
RBC BLD AUTO: ABNORMAL
SAO2 % BLDV: 87.6 % — SIGNIFICANT CHANGE UP (ref 67–88)
SAO2 % BLDV: 87.6 % — SIGNIFICANT CHANGE UP (ref 67–88)
SODIUM SERPL-SCNC: 137 MMOL/L — SIGNIFICANT CHANGE UP (ref 135–145)
SODIUM SERPL-SCNC: 137 MMOL/L — SIGNIFICANT CHANGE UP (ref 135–145)
TROPONIN T, HIGH SENSITIVITY RESULT: 31 NG/L — SIGNIFICANT CHANGE UP (ref 0–51)
TROPONIN T, HIGH SENSITIVITY RESULT: 31 NG/L — SIGNIFICANT CHANGE UP (ref 0–51)
TROPONIN T, HIGH SENSITIVITY RESULT: 33 NG/L — SIGNIFICANT CHANGE UP (ref 0–51)
TROPONIN T, HIGH SENSITIVITY RESULT: 33 NG/L — SIGNIFICANT CHANGE UP (ref 0–51)
WBC # BLD: 6.54 K/UL — SIGNIFICANT CHANGE UP (ref 3.8–10.5)
WBC # BLD: 6.54 K/UL — SIGNIFICANT CHANGE UP (ref 3.8–10.5)
WBC # FLD AUTO: 6.54 K/UL — SIGNIFICANT CHANGE UP (ref 3.8–10.5)
WBC # FLD AUTO: 6.54 K/UL — SIGNIFICANT CHANGE UP (ref 3.8–10.5)

## 2023-12-07 PROCEDURE — 70450 CT HEAD/BRAIN W/O DYE: CPT | Mod: MA

## 2023-12-07 PROCEDURE — 71045 X-RAY EXAM CHEST 1 VIEW: CPT | Mod: 26

## 2023-12-07 PROCEDURE — 84484 ASSAY OF TROPONIN QUANT: CPT

## 2023-12-07 PROCEDURE — 83735 ASSAY OF MAGNESIUM: CPT

## 2023-12-07 PROCEDURE — 83605 ASSAY OF LACTIC ACID: CPT

## 2023-12-07 PROCEDURE — 71250 CT THORAX DX C-: CPT | Mod: 26,MA

## 2023-12-07 PROCEDURE — 84132 ASSAY OF SERUM POTASSIUM: CPT

## 2023-12-07 PROCEDURE — 82435 ASSAY OF BLOOD CHLORIDE: CPT

## 2023-12-07 PROCEDURE — 82330 ASSAY OF CALCIUM: CPT

## 2023-12-07 PROCEDURE — 96374 THER/PROPH/DIAG INJ IV PUSH: CPT

## 2023-12-07 PROCEDURE — 71250 CT THORAX DX C-: CPT | Mod: MA

## 2023-12-07 PROCEDURE — 99285 EMERGENCY DEPT VISIT HI MDM: CPT | Mod: 25

## 2023-12-07 PROCEDURE — 71045 X-RAY EXAM CHEST 1 VIEW: CPT

## 2023-12-07 PROCEDURE — 36415 COLL VENOUS BLD VENIPUNCTURE: CPT

## 2023-12-07 PROCEDURE — 82947 ASSAY GLUCOSE BLOOD QUANT: CPT

## 2023-12-07 PROCEDURE — 96375 TX/PRO/DX INJ NEW DRUG ADDON: CPT

## 2023-12-07 PROCEDURE — 80053 COMPREHEN METABOLIC PANEL: CPT

## 2023-12-07 PROCEDURE — 70450 CT HEAD/BRAIN W/O DYE: CPT | Mod: 26,MA

## 2023-12-07 PROCEDURE — 82803 BLOOD GASES ANY COMBINATION: CPT

## 2023-12-07 PROCEDURE — 83880 ASSAY OF NATRIURETIC PEPTIDE: CPT

## 2023-12-07 PROCEDURE — 85018 HEMOGLOBIN: CPT

## 2023-12-07 PROCEDURE — 74176 CT ABD & PELVIS W/O CONTRAST: CPT | Mod: 26,MA

## 2023-12-07 PROCEDURE — 85014 HEMATOCRIT: CPT

## 2023-12-07 PROCEDURE — 84295 ASSAY OF SERUM SODIUM: CPT

## 2023-12-07 PROCEDURE — 85025 COMPLETE CBC W/AUTO DIFF WBC: CPT

## 2023-12-07 PROCEDURE — 83690 ASSAY OF LIPASE: CPT

## 2023-12-07 PROCEDURE — 93005 ELECTROCARDIOGRAM TRACING: CPT

## 2023-12-07 PROCEDURE — 74176 CT ABD & PELVIS W/O CONTRAST: CPT | Mod: MA

## 2023-12-07 RX ORDER — DIPHENHYDRAMINE HCL 50 MG
25 CAPSULE ORAL ONCE
Refills: 0 | Status: COMPLETED | OUTPATIENT
Start: 2023-12-07 | End: 2023-12-07

## 2023-12-07 RX ORDER — METOCLOPRAMIDE HCL 10 MG
10 TABLET ORAL ONCE
Refills: 0 | Status: COMPLETED | OUTPATIENT
Start: 2023-12-07 | End: 2023-12-07

## 2023-12-07 RX ORDER — ACETAMINOPHEN 500 MG
1000 TABLET ORAL ONCE
Refills: 0 | Status: COMPLETED | OUTPATIENT
Start: 2023-12-07 | End: 2023-12-07

## 2023-12-07 RX ADMIN — Medication 25 MILLIGRAM(S): at 02:45

## 2023-12-07 RX ADMIN — Medication 60 MILLIGRAM(S): at 05:09

## 2023-12-07 RX ADMIN — Medication 400 MILLIGRAM(S): at 02:45

## 2023-12-07 RX ADMIN — Medication 10 MILLIGRAM(S): at 02:45

## 2023-12-07 NOTE — ED PROVIDER NOTE - NSFOLLOWUPINSTRUCTIONS_ED_ALL_ED_FT
The CT scan today re-demonstrated that you have an enlarged spleen, it also showed worsening of chronic interstitial lung disease.  Please follow up with your primary care physician within the next week.  Return to the ER for any new or concerning symptoms.     You may take 650 mg acetaminophen six hours as needed for pain.   Please take prednisone over the next 4 days and the begin taking the Medrol dosepack on the 5th day.   Drink plenty of fluids and rest.         WHAT YOU NEED TO KNOW:    What is an enlarged spleen? An enlarged spleen is also called splenomegaly. Your spleen is in your left upper abdomen, just below your ribs. Your spleen is part of your lymph system and helps fight infection. It also helps control the amount of blood cells that flow through your body.  Abdominal Organs    What causes an enlarged spleen?    Infection    Liver disease    Blood disease    Autoimmune disease, such as rheumatoid arthritis or lupus    Cancer  What are the signs and symptoms of an enlarged spleen? You may not have any signs or symptoms. You may instead have any of the following:    Hiccups    Pain in the upper left side of your abdomen    Feeling full without eating or after eating a small amount    Tiredness    Easily bruising or bleeding    Frequent infections  How is an enlarged spleen diagnosed? Your healthcare provider will ask about your symptoms medical history. He or she may be able to feel your enlarged spleen. You may need any of the following tests:    Blood tests will show the number of red and white blood cells and platelets in your body. Blood tests will also show how well your liver is working.    An x-ray, ultrasound, CT, or MRI may show the enlarged spleen. You may be given contrast liquid to help the spleen show up better in the pictures. Tell the healthcare provider if you have ever had an allergic reaction to contrast liquid. Do not enter the MRI room with anything metal. Metal can cause serious injury. Tell the healthcare provider if you have any metal in or on your body.  How is an enlarged spleen treated? Treatment depends on what is causing your enlarged spleen. For example, if a bacterial infection caused your enlarged spleen, you will receive antibiotics. If you have no symptoms and no cause, your provider may suggest watchful waiting. This means you return for another exam in 6 to 12 months or sooner if you develop symptoms. Surgery to remove the spleen may be needed if a cause cannot be found or your enlarged spleen is causing severe problems.    How can I manage my symptoms?    Avoid contact sports and limit activity as directed by your healthcare provider. This will help prevent an injury or rupture (tear) in your spleen.    Keep your annual vaccines current. This will help prevent infection and illness.  When should I call my doctor?    You have severe pain in your left upper abdomen.    You have a fever, chills, and body aches.    You have questions or concerns about your condition or care.

## 2023-12-07 NOTE — ED ADULT NURSE NOTE - OBJECTIVE STATEMENT
Pt is a 63 y.o female c.o CP x 14 days. Pt is A&Ox3 resting in stretcher. Pt is accompanied by her daughter. PT endorses 2 weeks of CP following a cough. PT reports she was Pt is a 63 y.o female c.o CP x 14 days. Pt is A&Ox3 resting in stretcher. Pt is accompanied by her daughter. PT endorses 2 weeks of CP following a cough. PT reports she was previously seen by her PCP and was given Amoxicillin x5 days with no relief. Pt report the cough is dry with no productive mucus. Pt endorses radiating back pain when she coughs and dyspnea on exertion. As per pt, she has taken Tylenol at home with no relief of pain. Spontaneously breathing on RA>95%, [place] present RT upper chest for chronic anemia transfusion, skin dry and intact, peripheral pulses present. Pt denies any N/V/D, fever, chills, dysuria, blood in stool. Safety and comfort measures in place bed in lowest position, siderails upright and blanket given.

## 2023-12-07 NOTE — ED PROVIDER NOTE - ATTENDING CONTRIBUTION TO CARE
Attending (Marcos Hernandez D.O.):  I have personally seen and examined this patient. I have performed a substantive portion of the visit including all aspects of the medical decision making. Resident, fellow, student, and/or ACP note reviewed. I agree on the plan of care except where noted.    63-year-old female history of ESRD on dialysis MWF (full session done on 12/6), polycythemia vera myelofibrosis, splenomegaly, pulm fibrosis, here for 3 days of progressive, now constant diffuse headache, and left sided lower chest pain, felt in left upper abdomen, nonmigrating, no fever, chills. No time of day that sxs are worse. Headache w/o temporal relationship. Primarily here for headache, seems to be worse after nonprod cough, which has been ongoing x 4 weeks. No known sick contacts, recent travel. + 2kg weight increase that patient has noticed but no leg swelling or exertional shortness of breath. Here, patient hemodynam stable, with nonprod cough, no inc wob. Clear lungs. + LUE avf with thrill. Neurovacs intact. + splenomegaly with luq ttp, no cva ttp. No rib ttp. No leg swelling. No signs of anemia. AAox3, no facial ttp, no cervical ttp, PERRL 3mm, eomi. Hx and exam w/o signs of fluid overload, melinda in setting of full uncomplicated dialysis session. Do not think PE. Possible infectious but duration of sxs points away from persistent bacterial PNA. LUQ abd pain likely 2/2 splenomeg, exacerbated by cough. No signs of stroke on exam given steady gait, neurovasc intact w/o neuro deficits. Plan for labs, CTH, c/a/p noncontrast, labs, cardiac biomarkers -> BNP 8k but again no signs of volume overload. Headache near resolved after supportive care. CTs showing interstitial lung disease, increased splenomeg, gastric varies. All w/u, results dicussed with patient. r/b/a had re: steroids given sinus with mucous, likely post-nasal drip cough. Patient accepted steroids. Discussed to contiinue taking pepcid with this, stop at signs of blood or dark tarry stools, polyuria. Patient only makes a little urine. All q answered, strict return precautions given with verbal understanding expresssed. Stable for dc with outpt f/u.

## 2023-12-07 NOTE — ED PROVIDER NOTE - OBJECTIVE STATEMENT
63-year-old female history of ESRD on dialysis MWF, polycythemia vera myelofibrosis presenting for chest pain.  The patient states that she has been having a cough for the past 4 weeks, the patient states that 2 weeks ago she began having chest pain on the left side, and then a few days ago she started having a headache. The patient is also having LUQ pain, the patient endorses she has a large spleen. The patient reports that she was given amoxicillin by her PCP.  The patient is presenting today because her symptoms are persisting.  The patient denies fevers, chills, shortness of breath, or visual changes.

## 2023-12-07 NOTE — ED PROVIDER NOTE - NS_EDPROVIDERDISPOUSERTYPE_ED_A_ED
Anesthesia Post Evaluation    Patient: Kirk Perez III    Procedure(s) Performed: Procedure(s) (LRB):  REPAIR, ROTATOR CUFF, ARTHROSCOPIC (Right)    Final Anesthesia Type: general    Patient location during evaluation: PACU  Patient participation: Yes- Able to Participate  Level of consciousness: awake and alert, oriented and awake  Post-procedure vital signs: reviewed and stable  Pain management: adequate  Airway patency: patent    PONV status at discharge: No PONV  Anesthetic complications: no      Cardiovascular status: blood pressure returned to baseline  Respiratory status: unassisted and room air  Hydration status: euvolemic  Follow-up not needed.          Vitals Value Taken Time   /75 09/25/20 1740   Temp 36.6 °C (97.9 °F) 09/25/20 1740   Pulse 72 09/25/20 1740   Resp 20 09/25/20 1740   SpO2 95 % 09/25/20 1740         Event Time   Out of Recovery 17:40:00         Pain/Tisha Score: Pain Rating Prior to Med Admin: 6 (9/25/2020  5:34 PM)  Pain Rating Post Med Admin: 6 (9/25/2020  5:40 PM)  Tisha Score: 10 (9/25/2020  5:40 PM)         Attending Attestation (For Attendings USE Only)...

## 2023-12-07 NOTE — ED PROVIDER NOTE - CLINICAL SUMMARY MEDICAL DECISION MAKING FREE TEXT BOX
63-year-old female history of ESRD on dialysis MWF, polycythemia vera myelofibrosis presenting for chest pain.  The patient denies fevers, chills, shortness of breath, or visual changes. VS. Non actionable. PE. +LUQ TTP.  Differential is not limited to splenic bleed, ACS, pleural effusions, pneumonia, pericarditis. Will obtain basic labs, cardiac labs, chest xray, EKG and CT abdomen and pelvis. Dispo pending labs imaging and reassessment.

## 2023-12-07 NOTE — ED ADULT NURSE REASSESSMENT NOTE - NS ED NURSE REASSESS COMMENT FT1
As per salinas Howard for RN to access port. Port accessed using sterile technique. Positive blood return. Sterile dressing applied to site. PT tolerated well. Placement of port confirmed via chest x-ray.  Maintenance fluids infusing as per MD order. Safety and comfort maintained. Call bell within reach.

## 2023-12-07 NOTE — ED PROVIDER NOTE - PATIENT PORTAL LINK FT
You can access the FollowMyHealth Patient Portal offered by [facility] by registering at the following website: [email or web address]. By joining [employer]’s FollowMyHealth portal, you will also be able to view your health information using other applications (apps) compatible with our system. You can access the FollowMyHealth Patient Portal offered by [facility] by registering at the following website: [email or web address]. By joining [employer]’s FollowMyHealth portal, you will also be able to view your health information using other applications (apps) compatible with our system.

## 2023-12-07 NOTE — ED PROVIDER NOTE - CARE PLAN
Principal Discharge DX:	Persistent cough  Secondary Diagnosis:	Splenomegaly  Secondary Diagnosis:	Pulmonary fibrosis  Secondary Diagnosis:	Gastric varices  Secondary Diagnosis:	Stage 5 chronic kidney disease on dialysis   1

## 2023-12-07 NOTE — ED PROVIDER NOTE - PHYSICAL EXAMINATION
GENERAL: Awake, alert, NAD  HEENT: NC/AT, moist mucous membranes, PERRL, EOMI  LUNGS: CTAB, no wheezes or crackles   CARDIAC: RRR, no m/r/g  ABDOMEN: Soft, +LUQ TTP, non distended, no rebound, no guarding  BACK: No midline spinal tenderness, no CVA tenderness  EXT: No edema, no calf tenderness, 2+ DP pulses bilaterally, no deformities.  NEURO: A&Ox3. Moving all extremities.  SKIN: Warm and dry. No rash.  PSYCH: Normal affect.

## 2023-12-11 LAB
BILIRUB INDIRECT SERPL-MCNC: 0.8 MG/DL
FERRITIN SERPL-MCNC: 1744 NG/ML
FOLATE SERPL-MCNC: >20 NG/ML
HAPTOGLOB SERPL-MCNC: <20 MG/DL
HGB A MFR BLD: 97 %
HGB A2 MFR BLD: 2.7 %
HGB F MFR BLD: 0.3 %
HGB FRACT BLD-IMP: NORMAL
IRON SATN MFR SERPL: 41 %
IRON SERPL-MCNC: 74 UG/DL
LDH SERPL-CCNC: 387 U/L
TIBC SERPL-MCNC: 183 UG/DL
UIBC SERPL-MCNC: 109 UG/DL
VIT B12 SERPL-MCNC: 1235 PG/ML

## 2023-12-12 NOTE — ED ADULT NURSE NOTE - NURSING ED SKIN COLOR
Patient called very upset, crying requesting PCP to send in a higher dose of the Celexa as she thinks the Wellbutrin isn't working. Patient states that she can't get herself out of bed to take care of her kids and that she feels dizzy. Writer asked patient if she was having any thoughts of harming self or others. Patient stated \"I don't know, yeah. \"    Writer spoke with PCP and strongly advised patient to be seen in the ED for further evaluation asap.  Patient verbalized understanding
normal for race

## 2023-12-13 ENCOUNTER — OUTPATIENT (OUTPATIENT)
Dept: OUTPATIENT SERVICES | Facility: HOSPITAL | Age: 63
LOS: 1 days | End: 2023-12-13
Payer: SELF-PAY

## 2023-12-13 ENCOUNTER — APPOINTMENT (OUTPATIENT)
Dept: INTERNAL MEDICINE | Facility: CLINIC | Age: 63
End: 2023-12-13
Payer: COMMERCIAL

## 2023-12-13 VITALS
WEIGHT: 126 LBS | BODY MASS INDEX: 23.19 KG/M2 | SYSTOLIC BLOOD PRESSURE: 120 MMHG | HEIGHT: 61.73 IN | DIASTOLIC BLOOD PRESSURE: 70 MMHG | OXYGEN SATURATION: 98 % | HEART RATE: 82 BPM

## 2023-12-13 DIAGNOSIS — D45 POLYCYTHEMIA VERA: ICD-10-CM

## 2023-12-13 DIAGNOSIS — R05.9 COUGH, UNSPECIFIED: ICD-10-CM

## 2023-12-13 DIAGNOSIS — I10 ESSENTIAL (PRIMARY) HYPERTENSION: ICD-10-CM

## 2023-12-13 DIAGNOSIS — T85.898A OTHER SPECIFIED COMPLICATION OF OTHER INTERNAL PROSTHETIC DEVICES, IMPLANTS AND GRAFTS, INITIAL ENCOUNTER: Chronic | ICD-10-CM

## 2023-12-13 DIAGNOSIS — I77.0 ARTERIOVENOUS FISTULA, ACQUIRED: Chronic | ICD-10-CM

## 2023-12-13 DIAGNOSIS — Z99.2 DEPENDENCE ON RENAL DIALYSIS: Chronic | ICD-10-CM

## 2023-12-13 PROCEDURE — 99213 OFFICE O/P EST LOW 20 MIN: CPT | Mod: GC

## 2023-12-13 PROCEDURE — G0463: CPT

## 2023-12-13 NOTE — ASSESSMENT
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   #?ILD vs bronchitis -pulmonology ref for PFTs to assess for interstitial lung disease -told pt not to use steroids for now until she is further evaluated -prescribed tessalon perle for cough   #Polycythemia vera -pt has heme/onc appt in 2 weeks - currently only taking danazol, no jakafi for 2 wks  -tasked Dr Jacky Guo to make him aware of new events since last visit- unclear if pt should be on jakafi at least until next appointment if she is not starting new medication now, or if she should be seen sooner by heme/onc  RTC in 3 mos for followup  Case d/w Dr. Guo

## 2023-12-13 NOTE — END OF VISIT
[] : Resident [FreeTextEntry3] : subacute cough minimal production per pt without resp compromise. ?post viral cough vs pulm etiology wwith abn Ct findings plan as outlined. address todays visit with Hem Onc re management of meds

## 2023-12-13 NOTE — HISTORY OF PRESENT ILLNESS
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 She was seen by heme/onc inpatient and transfused 2 uPRBC. She was seen by heme/onc after hospital stay and told she would be switched off jakafi to momelotinib- however was told to finish jakafi and hold new medication in light of new cough. She was started on amoxicillin for cough at that time. Pt went to ED on 12/7 for cough persisting for 4 weeks now. Described as intermittent productive cough with white sputum with associated SOB when she lays down to sleep. CT imaging showed interstitial lung disease and increased splenomegaly. She was prescribed prednisone 20mg and medrol but did not take it. Denies any fevers, chills, lightheadedness, palpitations, nvd, abdominal pain.

## 2023-12-14 ENCOUNTER — APPOINTMENT (OUTPATIENT)
Dept: INTERNAL MEDICINE | Facility: CLINIC | Age: 63
End: 2023-12-14

## 2023-12-18 ENCOUNTER — INPATIENT (INPATIENT)
Facility: HOSPITAL | Age: 63
LOS: 3 days | Discharge: HOME | DRG: 682 | End: 2023-12-22
Attending: INTERNAL MEDICINE | Admitting: STUDENT IN AN ORGANIZED HEALTH CARE EDUCATION/TRAINING PROGRAM
Payer: MEDICAID

## 2023-12-18 VITALS
OXYGEN SATURATION: 99 % | HEIGHT: 61.73 IN | SYSTOLIC BLOOD PRESSURE: 98 MMHG | HEART RATE: 83 BPM | DIASTOLIC BLOOD PRESSURE: 67 MMHG | WEIGHT: 130.07 LBS | TEMPERATURE: 99 F | RESPIRATION RATE: 20 BRPM

## 2023-12-18 DIAGNOSIS — T85.898A OTHER SPECIFIED COMPLICATION OF OTHER INTERNAL PROSTHETIC DEVICES, IMPLANTS AND GRAFTS, INITIAL ENCOUNTER: Chronic | ICD-10-CM

## 2023-12-18 DIAGNOSIS — Z99.2 DEPENDENCE ON RENAL DIALYSIS: Chronic | ICD-10-CM

## 2023-12-18 DIAGNOSIS — I77.0 ARTERIOVENOUS FISTULA, ACQUIRED: Chronic | ICD-10-CM

## 2023-12-18 LAB
ALBUMIN SERPL ELPH-MCNC: 3.7 G/DL — SIGNIFICANT CHANGE UP (ref 3.3–5)
ALBUMIN SERPL ELPH-MCNC: 3.7 G/DL — SIGNIFICANT CHANGE UP (ref 3.3–5)
ALBUMIN SERPL ELPH-MCNC: 4.1 G/DL — SIGNIFICANT CHANGE UP (ref 3.3–5)
ALBUMIN SERPL ELPH-MCNC: 4.1 G/DL — SIGNIFICANT CHANGE UP (ref 3.3–5)
ALP SERPL-CCNC: 61 U/L — SIGNIFICANT CHANGE UP (ref 40–120)
ALP SERPL-CCNC: 61 U/L — SIGNIFICANT CHANGE UP (ref 40–120)
ALP SERPL-CCNC: 66 U/L — SIGNIFICANT CHANGE UP (ref 40–120)
ALP SERPL-CCNC: 66 U/L — SIGNIFICANT CHANGE UP (ref 40–120)
ALT FLD-CCNC: 15 U/L — SIGNIFICANT CHANGE UP (ref 10–45)
ANION GAP SERPL CALC-SCNC: 17 MMOL/L — SIGNIFICANT CHANGE UP (ref 5–17)
ANION GAP SERPL CALC-SCNC: 17 MMOL/L — SIGNIFICANT CHANGE UP (ref 5–17)
ANION GAP SERPL CALC-SCNC: 18 MMOL/L — HIGH (ref 5–17)
ANION GAP SERPL CALC-SCNC: 18 MMOL/L — HIGH (ref 5–17)
ANISOCYTOSIS BLD QL: SLIGHT — SIGNIFICANT CHANGE UP
ANISOCYTOSIS BLD QL: SLIGHT — SIGNIFICANT CHANGE UP
APPEARANCE UR: CLEAR — SIGNIFICANT CHANGE UP
APPEARANCE UR: CLEAR — SIGNIFICANT CHANGE UP
APTT BLD: 30.3 SEC — SIGNIFICANT CHANGE UP (ref 24.5–35.6)
APTT BLD: 30.3 SEC — SIGNIFICANT CHANGE UP (ref 24.5–35.6)
AST SERPL-CCNC: 21 U/L — SIGNIFICANT CHANGE UP (ref 10–40)
AST SERPL-CCNC: 21 U/L — SIGNIFICANT CHANGE UP (ref 10–40)
AST SERPL-CCNC: 39 U/L — SIGNIFICANT CHANGE UP (ref 10–40)
AST SERPL-CCNC: 39 U/L — SIGNIFICANT CHANGE UP (ref 10–40)
BACTERIA # UR AUTO: NEGATIVE /HPF — SIGNIFICANT CHANGE UP
BACTERIA # UR AUTO: NEGATIVE /HPF — SIGNIFICANT CHANGE UP
BASE EXCESS BLDV CALC-SCNC: -6 MMOL/L — LOW (ref -2–3)
BASE EXCESS BLDV CALC-SCNC: -6 MMOL/L — LOW (ref -2–3)
BASOPHILS # BLD AUTO: 0.04 K/UL — SIGNIFICANT CHANGE UP (ref 0–0.2)
BASOPHILS # BLD AUTO: 0.04 K/UL — SIGNIFICANT CHANGE UP (ref 0–0.2)
BASOPHILS NFR BLD AUTO: 0.5 % — SIGNIFICANT CHANGE UP (ref 0–2)
BASOPHILS NFR BLD AUTO: 0.5 % — SIGNIFICANT CHANGE UP (ref 0–2)
BILIRUB DIRECT SERPL-MCNC: 0.4 MG/DL — HIGH (ref 0–0.3)
BILIRUB DIRECT SERPL-MCNC: 0.4 MG/DL — HIGH (ref 0–0.3)
BILIRUB INDIRECT FLD-MCNC: 2 MG/DL — HIGH (ref 0.2–1)
BILIRUB INDIRECT FLD-MCNC: 2 MG/DL — HIGH (ref 0.2–1)
BILIRUB SERPL-MCNC: 2.4 MG/DL — HIGH (ref 0.2–1.2)
BILIRUB SERPL-MCNC: 2.7 MG/DL — HIGH (ref 0.2–1.2)
BILIRUB SERPL-MCNC: 2.7 MG/DL — HIGH (ref 0.2–1.2)
BILIRUB UR-MCNC: NEGATIVE — SIGNIFICANT CHANGE UP
BILIRUB UR-MCNC: NEGATIVE — SIGNIFICANT CHANGE UP
BUN SERPL-MCNC: 48 MG/DL — HIGH (ref 7–23)
BUN SERPL-MCNC: 48 MG/DL — HIGH (ref 7–23)
BUN SERPL-MCNC: 50 MG/DL — HIGH (ref 7–23)
BUN SERPL-MCNC: 50 MG/DL — HIGH (ref 7–23)
BURR CELLS BLD QL SMEAR: PRESENT — SIGNIFICANT CHANGE UP
BURR CELLS BLD QL SMEAR: PRESENT — SIGNIFICANT CHANGE UP
CA-I SERPL-SCNC: 1.1 MMOL/L — LOW (ref 1.15–1.33)
CA-I SERPL-SCNC: 1.1 MMOL/L — LOW (ref 1.15–1.33)
CALCIUM SERPL-MCNC: 8.5 MG/DL — SIGNIFICANT CHANGE UP (ref 8.4–10.5)
CALCIUM SERPL-MCNC: 8.5 MG/DL — SIGNIFICANT CHANGE UP (ref 8.4–10.5)
CALCIUM SERPL-MCNC: 8.6 MG/DL — SIGNIFICANT CHANGE UP (ref 8.4–10.5)
CALCIUM SERPL-MCNC: 8.6 MG/DL — SIGNIFICANT CHANGE UP (ref 8.4–10.5)
CAST: 4 /LPF — SIGNIFICANT CHANGE UP (ref 0–4)
CAST: 4 /LPF — SIGNIFICANT CHANGE UP (ref 0–4)
CHLORIDE BLDV-SCNC: 101 MMOL/L — SIGNIFICANT CHANGE UP (ref 96–108)
CHLORIDE BLDV-SCNC: 101 MMOL/L — SIGNIFICANT CHANGE UP (ref 96–108)
CHLORIDE SERPL-SCNC: 99 MMOL/L — SIGNIFICANT CHANGE UP (ref 96–108)
CO2 BLDV-SCNC: 19 MMOL/L — LOW (ref 22–26)
CO2 BLDV-SCNC: 19 MMOL/L — LOW (ref 22–26)
CO2 SERPL-SCNC: 16 MMOL/L — LOW (ref 22–31)
CO2 SERPL-SCNC: 16 MMOL/L — LOW (ref 22–31)
CO2 SERPL-SCNC: 18 MMOL/L — LOW (ref 22–31)
CO2 SERPL-SCNC: 18 MMOL/L — LOW (ref 22–31)
COLOR SPEC: YELLOW — SIGNIFICANT CHANGE UP
COLOR SPEC: YELLOW — SIGNIFICANT CHANGE UP
CREAT SERPL-MCNC: 9.25 MG/DL — HIGH (ref 0.5–1.3)
CREAT SERPL-MCNC: 9.25 MG/DL — HIGH (ref 0.5–1.3)
CREAT SERPL-MCNC: 9.47 MG/DL — HIGH (ref 0.5–1.3)
CREAT SERPL-MCNC: 9.47 MG/DL — HIGH (ref 0.5–1.3)
DACRYOCYTES BLD QL SMEAR: SLIGHT — SIGNIFICANT CHANGE UP
DACRYOCYTES BLD QL SMEAR: SLIGHT — SIGNIFICANT CHANGE UP
DIFF PNL FLD: ABNORMAL
DIFF PNL FLD: ABNORMAL
EGFR: 4 ML/MIN/1.73M2 — LOW
ELLIPTOCYTES BLD QL SMEAR: SLIGHT — SIGNIFICANT CHANGE UP
ELLIPTOCYTES BLD QL SMEAR: SLIGHT — SIGNIFICANT CHANGE UP
EOSINOPHIL # BLD AUTO: 0.1 K/UL — SIGNIFICANT CHANGE UP (ref 0–0.5)
EOSINOPHIL # BLD AUTO: 0.1 K/UL — SIGNIFICANT CHANGE UP (ref 0–0.5)
EOSINOPHIL NFR BLD AUTO: 1.3 % — SIGNIFICANT CHANGE UP (ref 0–6)
EOSINOPHIL NFR BLD AUTO: 1.3 % — SIGNIFICANT CHANGE UP (ref 0–6)
FLUAV AG NPH QL: SIGNIFICANT CHANGE UP
FLUAV AG NPH QL: SIGNIFICANT CHANGE UP
FLUBV AG NPH QL: SIGNIFICANT CHANGE UP
FLUBV AG NPH QL: SIGNIFICANT CHANGE UP
GAS PNL BLDV: 128 MMOL/L — LOW (ref 136–145)
GAS PNL BLDV: 128 MMOL/L — LOW (ref 136–145)
GAS PNL BLDV: SIGNIFICANT CHANGE UP
GAS PNL BLDV: SIGNIFICANT CHANGE UP
GLUCOSE BLDV-MCNC: 72 MG/DL — SIGNIFICANT CHANGE UP (ref 70–99)
GLUCOSE BLDV-MCNC: 72 MG/DL — SIGNIFICANT CHANGE UP (ref 70–99)
GLUCOSE SERPL-MCNC: 77 MG/DL — SIGNIFICANT CHANGE UP (ref 70–99)
GLUCOSE SERPL-MCNC: 77 MG/DL — SIGNIFICANT CHANGE UP (ref 70–99)
GLUCOSE SERPL-MCNC: 89 MG/DL — SIGNIFICANT CHANGE UP (ref 70–99)
GLUCOSE SERPL-MCNC: 89 MG/DL — SIGNIFICANT CHANGE UP (ref 70–99)
GLUCOSE UR QL: 250 MG/DL
GLUCOSE UR QL: 250 MG/DL
HCO3 BLDV-SCNC: 18 MMOL/L — LOW (ref 22–29)
HCO3 BLDV-SCNC: 18 MMOL/L — LOW (ref 22–29)
HCT VFR BLD CALC: 26.7 % — LOW (ref 34.5–45)
HCT VFR BLD CALC: 26.7 % — LOW (ref 34.5–45)
HCT VFR BLDA CALC: 26 % — LOW (ref 34.5–46.5)
HCT VFR BLDA CALC: 26 % — LOW (ref 34.5–46.5)
HGB BLD CALC-MCNC: 8.8 G/DL — LOW (ref 11.7–16.1)
HGB BLD CALC-MCNC: 8.8 G/DL — LOW (ref 11.7–16.1)
HGB BLD-MCNC: 8.2 G/DL — LOW (ref 11.5–15.5)
HGB BLD-MCNC: 8.2 G/DL — LOW (ref 11.5–15.5)
IMM GRANULOCYTES NFR BLD AUTO: 3.9 % — HIGH (ref 0–0.9)
IMM GRANULOCYTES NFR BLD AUTO: 3.9 % — HIGH (ref 0–0.9)
INR BLD: 1.33 RATIO — HIGH (ref 0.85–1.18)
INR BLD: 1.33 RATIO — HIGH (ref 0.85–1.18)
KETONES UR-MCNC: NEGATIVE MG/DL — SIGNIFICANT CHANGE UP
KETONES UR-MCNC: NEGATIVE MG/DL — SIGNIFICANT CHANGE UP
LACTATE BLDV-MCNC: 1.5 MMOL/L — SIGNIFICANT CHANGE UP (ref 0.5–2)
LACTATE BLDV-MCNC: 1.5 MMOL/L — SIGNIFICANT CHANGE UP (ref 0.5–2)
LEUKOCYTE ESTERASE UR-ACNC: ABNORMAL
LEUKOCYTE ESTERASE UR-ACNC: ABNORMAL
LYMPHOCYTES # BLD AUTO: 0.51 K/UL — LOW (ref 1–3.3)
LYMPHOCYTES # BLD AUTO: 0.51 K/UL — LOW (ref 1–3.3)
LYMPHOCYTES # BLD AUTO: 6.6 % — LOW (ref 13–44)
LYMPHOCYTES # BLD AUTO: 6.6 % — LOW (ref 13–44)
MACROCYTES BLD QL: SIGNIFICANT CHANGE UP
MACROCYTES BLD QL: SIGNIFICANT CHANGE UP
MANUAL SMEAR VERIFICATION: SIGNIFICANT CHANGE UP
MANUAL SMEAR VERIFICATION: SIGNIFICANT CHANGE UP
MCHC RBC-ENTMCNC: 30.4 PG — SIGNIFICANT CHANGE UP (ref 27–34)
MCHC RBC-ENTMCNC: 30.4 PG — SIGNIFICANT CHANGE UP (ref 27–34)
MCHC RBC-ENTMCNC: 30.7 GM/DL — LOW (ref 32–36)
MCHC RBC-ENTMCNC: 30.7 GM/DL — LOW (ref 32–36)
MCV RBC AUTO: 98.9 FL — SIGNIFICANT CHANGE UP (ref 80–100)
MCV RBC AUTO: 98.9 FL — SIGNIFICANT CHANGE UP (ref 80–100)
METAMYELOCYTES # FLD: 0.9 % — HIGH (ref 0–0)
METAMYELOCYTES # FLD: 0.9 % — HIGH (ref 0–0)
MICROCYTES BLD QL: SLIGHT — SIGNIFICANT CHANGE UP
MICROCYTES BLD QL: SLIGHT — SIGNIFICANT CHANGE UP
MONOCYTES # BLD AUTO: 0.41 K/UL — SIGNIFICANT CHANGE UP (ref 0–0.9)
MONOCYTES # BLD AUTO: 0.41 K/UL — SIGNIFICANT CHANGE UP (ref 0–0.9)
MONOCYTES NFR BLD AUTO: 5.3 % — SIGNIFICANT CHANGE UP (ref 2–14)
MONOCYTES NFR BLD AUTO: 5.3 % — SIGNIFICANT CHANGE UP (ref 2–14)
MYELOCYTES NFR BLD: 0.9 % — HIGH (ref 0–0)
MYELOCYTES NFR BLD: 0.9 % — HIGH (ref 0–0)
NEUTROPHILS # BLD AUTO: 6.32 K/UL — SIGNIFICANT CHANGE UP (ref 1.8–7.4)
NEUTROPHILS # BLD AUTO: 6.32 K/UL — SIGNIFICANT CHANGE UP (ref 1.8–7.4)
NEUTROPHILS NFR BLD AUTO: 77.8 % — HIGH (ref 43–77)
NEUTROPHILS NFR BLD AUTO: 77.8 % — HIGH (ref 43–77)
NEUTS BAND # BLD: 4.4 % — SIGNIFICANT CHANGE UP (ref 0–8)
NEUTS BAND # BLD: 4.4 % — SIGNIFICANT CHANGE UP (ref 0–8)
NITRITE UR-MCNC: NEGATIVE — SIGNIFICANT CHANGE UP
NITRITE UR-MCNC: NEGATIVE — SIGNIFICANT CHANGE UP
NRBC # BLD: 1 /100 WBCS — HIGH (ref 0–0)
NRBC # BLD: 1 /100 WBCS — HIGH (ref 0–0)
OB PNL STL: NEGATIVE — SIGNIFICANT CHANGE UP
OB PNL STL: NEGATIVE — SIGNIFICANT CHANGE UP
OVALOCYTES BLD QL SMEAR: SLIGHT — SIGNIFICANT CHANGE UP
OVALOCYTES BLD QL SMEAR: SLIGHT — SIGNIFICANT CHANGE UP
PCO2 BLDV: 29 MMHG — LOW (ref 39–42)
PCO2 BLDV: 29 MMHG — LOW (ref 39–42)
PH BLDV: 7.4 — SIGNIFICANT CHANGE UP (ref 7.32–7.43)
PH BLDV: 7.4 — SIGNIFICANT CHANGE UP (ref 7.32–7.43)
PH UR: >=9 (ref 5–8)
PH UR: >=9 (ref 5–8)
PLAT MORPH BLD: NORMAL — SIGNIFICANT CHANGE UP
PLAT MORPH BLD: NORMAL — SIGNIFICANT CHANGE UP
PLATELET # BLD AUTO: 111 K/UL — LOW (ref 150–400)
PLATELET # BLD AUTO: 111 K/UL — LOW (ref 150–400)
PO2 BLDV: 69 MMHG — HIGH (ref 25–45)
PO2 BLDV: 69 MMHG — HIGH (ref 25–45)
POIKILOCYTOSIS BLD QL AUTO: SIGNIFICANT CHANGE UP
POIKILOCYTOSIS BLD QL AUTO: SIGNIFICANT CHANGE UP
POLYCHROMASIA BLD QL SMEAR: SLIGHT — SIGNIFICANT CHANGE UP
POLYCHROMASIA BLD QL SMEAR: SLIGHT — SIGNIFICANT CHANGE UP
POTASSIUM BLDV-SCNC: 6 MMOL/L — HIGH (ref 3.5–5.1)
POTASSIUM BLDV-SCNC: 6 MMOL/L — HIGH (ref 3.5–5.1)
POTASSIUM SERPL-MCNC: 5.4 MMOL/L — HIGH (ref 3.5–5.3)
POTASSIUM SERPL-MCNC: 5.4 MMOL/L — HIGH (ref 3.5–5.3)
POTASSIUM SERPL-MCNC: 6 MMOL/L — HIGH (ref 3.5–5.3)
POTASSIUM SERPL-MCNC: 6 MMOL/L — HIGH (ref 3.5–5.3)
POTASSIUM SERPL-SCNC: 5.4 MMOL/L — HIGH (ref 3.5–5.3)
POTASSIUM SERPL-SCNC: 5.4 MMOL/L — HIGH (ref 3.5–5.3)
POTASSIUM SERPL-SCNC: 6 MMOL/L — HIGH (ref 3.5–5.3)
POTASSIUM SERPL-SCNC: 6 MMOL/L — HIGH (ref 3.5–5.3)
PROT SERPL-MCNC: 7 G/DL — SIGNIFICANT CHANGE UP (ref 6–8.3)
PROT SERPL-MCNC: 7 G/DL — SIGNIFICANT CHANGE UP (ref 6–8.3)
PROT SERPL-MCNC: 7.1 G/DL — SIGNIFICANT CHANGE UP (ref 6–8.3)
PROT SERPL-MCNC: 7.1 G/DL — SIGNIFICANT CHANGE UP (ref 6–8.3)
PROT UR-MCNC: 100 MG/DL
PROT UR-MCNC: 100 MG/DL
PROTHROM AB SERPL-ACNC: 13.8 SEC — HIGH (ref 9.5–13)
PROTHROM AB SERPL-ACNC: 13.8 SEC — HIGH (ref 9.5–13)
RBC # BLD: 2.7 M/UL — LOW (ref 3.8–5.2)
RBC # BLD: 2.7 M/UL — LOW (ref 3.8–5.2)
RBC # FLD: 20.2 % — HIGH (ref 10.3–14.5)
RBC # FLD: 20.2 % — HIGH (ref 10.3–14.5)
RBC BLD AUTO: ABNORMAL
RBC BLD AUTO: ABNORMAL
RBC CASTS # UR COMP ASSIST: 1 /HPF — SIGNIFICANT CHANGE UP (ref 0–4)
RBC CASTS # UR COMP ASSIST: 1 /HPF — SIGNIFICANT CHANGE UP (ref 0–4)
RSV RNA NPH QL NAA+NON-PROBE: SIGNIFICANT CHANGE UP
RSV RNA NPH QL NAA+NON-PROBE: SIGNIFICANT CHANGE UP
SAO2 % BLDV: 94.3 % — HIGH (ref 67–88)
SAO2 % BLDV: 94.3 % — HIGH (ref 67–88)
SARS-COV-2 RNA SPEC QL NAA+PROBE: SIGNIFICANT CHANGE UP
SARS-COV-2 RNA SPEC QL NAA+PROBE: SIGNIFICANT CHANGE UP
SODIUM SERPL-SCNC: 133 MMOL/L — LOW (ref 135–145)
SODIUM SERPL-SCNC: 133 MMOL/L — LOW (ref 135–145)
SODIUM SERPL-SCNC: 134 MMOL/L — LOW (ref 135–145)
SODIUM SERPL-SCNC: 134 MMOL/L — LOW (ref 135–145)
SP GR SPEC: 1.01 — SIGNIFICANT CHANGE UP (ref 1–1.03)
SP GR SPEC: 1.01 — SIGNIFICANT CHANGE UP (ref 1–1.03)
SQUAMOUS # UR AUTO: 15 /HPF — HIGH (ref 0–5)
SQUAMOUS # UR AUTO: 15 /HPF — HIGH (ref 0–5)
UROBILINOGEN FLD QL: 1 MG/DL — SIGNIFICANT CHANGE UP (ref 0.2–1)
UROBILINOGEN FLD QL: 1 MG/DL — SIGNIFICANT CHANGE UP (ref 0.2–1)
WBC # BLD: 7.69 K/UL — SIGNIFICANT CHANGE UP (ref 3.8–10.5)
WBC # BLD: 7.69 K/UL — SIGNIFICANT CHANGE UP (ref 3.8–10.5)
WBC # FLD AUTO: 7.69 K/UL — SIGNIFICANT CHANGE UP (ref 3.8–10.5)
WBC # FLD AUTO: 7.69 K/UL — SIGNIFICANT CHANGE UP (ref 3.8–10.5)
WBC UR QL: 4 /HPF — SIGNIFICANT CHANGE UP (ref 0–5)
WBC UR QL: 4 /HPF — SIGNIFICANT CHANGE UP (ref 0–5)

## 2023-12-18 PROCEDURE — 99285 EMERGENCY DEPT VISIT HI MDM: CPT

## 2023-12-18 PROCEDURE — 71045 X-RAY EXAM CHEST 1 VIEW: CPT | Mod: 26

## 2023-12-18 RX ORDER — SODIUM CHLORIDE 9 MG/ML
1850 INJECTION INTRAMUSCULAR; INTRAVENOUS; SUBCUTANEOUS ONCE
Refills: 0 | Status: DISCONTINUED | OUTPATIENT
Start: 2023-12-18 | End: 2023-12-18

## 2023-12-18 RX ORDER — CALCIUM GLUCONATE 100 MG/ML
1 VIAL (ML) INTRAVENOUS ONCE
Refills: 0 | Status: COMPLETED | OUTPATIENT
Start: 2023-12-18 | End: 2023-12-18

## 2023-12-18 RX ORDER — DEXTROSE 50 % IN WATER 50 %
25 SYRINGE (ML) INTRAVENOUS ONCE
Refills: 0 | Status: COMPLETED | OUTPATIENT
Start: 2023-12-18 | End: 2023-12-18

## 2023-12-18 RX ORDER — ALBUTEROL 90 UG/1
2.5 AEROSOL, METERED ORAL ONCE
Refills: 0 | Status: COMPLETED | OUTPATIENT
Start: 2023-12-18 | End: 2023-12-18

## 2023-12-18 RX ORDER — ACETAMINOPHEN 500 MG
1000 TABLET ORAL ONCE
Refills: 0 | Status: COMPLETED | OUTPATIENT
Start: 2023-12-18 | End: 2023-12-18

## 2023-12-18 RX ORDER — PIPERACILLIN AND TAZOBACTAM 4; .5 G/20ML; G/20ML
3.38 INJECTION, POWDER, LYOPHILIZED, FOR SOLUTION INTRAVENOUS ONCE
Refills: 0 | Status: COMPLETED | OUTPATIENT
Start: 2023-12-18 | End: 2023-12-18

## 2023-12-18 RX ORDER — SODIUM ZIRCONIUM CYCLOSILICATE 10 G/10G
10 POWDER, FOR SUSPENSION ORAL ONCE
Refills: 0 | Status: COMPLETED | OUTPATIENT
Start: 2023-12-18 | End: 2023-12-18

## 2023-12-18 RX ORDER — INSULIN HUMAN 100 [IU]/ML
5 INJECTION, SOLUTION SUBCUTANEOUS ONCE
Refills: 0 | Status: COMPLETED | OUTPATIENT
Start: 2023-12-18 | End: 2023-12-18

## 2023-12-18 RX ADMIN — INSULIN HUMAN 5 UNIT(S): 100 INJECTION, SOLUTION SUBCUTANEOUS at 23:10

## 2023-12-18 RX ADMIN — Medication 400 MILLIGRAM(S): at 23:50

## 2023-12-18 RX ADMIN — Medication 25 GRAM(S): at 23:09

## 2023-12-18 RX ADMIN — Medication 100 GRAM(S): at 23:14

## 2023-12-18 RX ADMIN — ALBUTEROL 2.5 MILLIGRAM(S): 90 AEROSOL, METERED ORAL at 23:08

## 2023-12-18 RX ADMIN — SODIUM ZIRCONIUM CYCLOSILICATE 10 GRAM(S): 10 POWDER, FOR SUSPENSION ORAL at 23:08

## 2023-12-18 NOTE — ED ADULT NURSE REASSESSMENT NOTE - NS ED NURSE REASSESS COMMENT FT1
Placement of port confirmed via chest x-ray. Okay to access as per MD orders. Port accessed using sterile technique.  +blood return. Sterile dressing applied to site. Patient tolerated well.  Safety and comfort of patient maintained.

## 2023-12-18 NOTE — ED PROVIDER NOTE - ATTENDING CONTRIBUTION TO CARE
Patient is a 63-year-old female with a history of hypertension, polycythemia vera and secondary myelofibrosis, splenomegaly, ESRD on HD M/W/, last HD on Friday here for evaluation of weakness, dizziness and missed HD.  Patient states that she has not been feeling well since last night.  She thinks she had a subjective fever earlier today.  She states she felt very dizzy around 5 PM today when going to dialysis.  She states she has been having diarrhea which was black.  She denies any vomiting but reports nausea.  She also reports she has a cough.  No known sick contacts.  She states she still makes urine. Patient states she took tylenol around 4 pm.       VS noted  Gen. no acute distress, Non toxic   HEENT: EOMI, mmm  Lungs: CTAB/L no C/ W /R   CVS: RRR   Abd; Soft non tender, non distended, rectal done by Dr. Pathak in my presence: Brown stool, no active bleeding, no external hemorrhoids, no blood on glove  Ext: no edema  Skin: no rash  Neuro AAOx3 non focal clear speech  a/p: fever, dizziness - missed HD -  patient has a rectal temp of 99.8.  Plan for sepsis labs.  Will send flu/COVID.  On rectal exam, patient has brown stool.  There is no active bleeding.  Differential could be metabolic derangement, viral infection, less likely pneumonia or UTI.  Will hold on fluids because patient missed HD today and concern for fluid overload.  - Reyna BAH Patient is a 63-year-old female with a history of hypertension, polycythemia vera and secondary myelofibrosis, splenomegaly, ESRD on HD M/W/, last HD on Friday here for evaluation of weakness, dizziness and missed HD.  Patient states that she has not been feeling well since last night.  She thinks she had a subjective fever earlier today.  She states she felt very dizzy around 5 PM today when going to dialysis.  She states she has been having diarrhea which was black.  She denies any vomiting but reports nausea.  She also reports she has a cough.  No known sick contacts.  She states she still makes urine. Patient states she took tylenol around 4 pm.       VS noted  Gen. no acute distress, Non toxic   HEENT: EOMI, mmm, questionable scleral icterus  Lungs: CTAB/L no C/ W /R   CVS: RRR   Abd; Soft non tender, non distended, rectal done by Dr. Pathak in my presence: Brown stool, no active bleeding, no external hemorrhoids, no blood on glove  Ext: no edema  Skin: no rash  Neuro AAOx3 non focal clear speech  a/p: fever, dizziness - missed HD -  patient has a rectal temp of 99.8.  Plan for sepsis labs.  Will send flu/COVID.  On rectal exam, patient has brown stool.  There is no active bleeding.  Differential could be metabolic derangement, viral infection, less likely pneumonia or UTI.  Will hold on fluids because patient missed HD today and concern for fluid overload.  - Reyna BAH

## 2023-12-18 NOTE — ED PROVIDER NOTE - OBJECTIVE STATEMENT
Patient is a 63y female with pmhx of ESRD on dialysis MWF who presents to the ED for weakness and fatigue. Patient was unable to go to her dialysis session today due to weakness. Patient felt like she was going to faint. Patient denies any fever or chills. Denies any chest pain, shortness of breath, burning with urination.

## 2023-12-18 NOTE — ED ADULT NURSE NOTE - OBJECTIVE STATEMENT
63 year old female pt presented to the ED via ems stating pt with increase malaise/lethargy and cough x 4 weeks, pt with left arm AV fistual, MWF dialysis, pt states chills this am with approx 10 episode of diarrhea overnight, pt is ambulatory A&Ox3 states headache, last dose of tylenol at 1600 today, abd soft non tender non distended pt is jaundice appearing

## 2023-12-18 NOTE — ED PROVIDER NOTE - CLINICAL SUMMARY MEDICAL DECISION MAKING FREE TEXT BOX
Patient presents to the ED with weakness. Patient is hemodynamically stable and afebrile on presentation. Patents physical exam is unremarkable at this time. Given patients presentation and hx we will obtain labs to evaluate for any acute pathologies. Pending labs and imaging for further dispo.

## 2023-12-18 NOTE — ED ADULT NURSE NOTE - NSFALLUNIVINTERV_ED_ALL_ED
Bed/Stretcher in lowest position, wheels locked, appropriate side rails in place/Call bell, personal items and telephone in reach/Instruct patient to call for assistance before getting out of bed/chair/stretcher/Non-slip footwear applied when patient is off stretcher/[place] to call system/Physically safe environment - no spills, clutter or unnecessary equipment/Purposeful proactive rounding/Room/bathroom lighting operational, light cord in reach Bed/Stretcher in lowest position, wheels locked, appropriate side rails in place/Call bell, personal items and telephone in reach/Instruct patient to call for assistance before getting out of bed/chair/stretcher/Non-slip footwear applied when patient is off stretcher/[place] to call system/Physically safe environment - no spills, clutter or unnecessary equipment/Purposeful proactive rounding/Room/bathroom lighting operational, light cord in reach

## 2023-12-19 DIAGNOSIS — D45 POLYCYTHEMIA VERA: ICD-10-CM

## 2023-12-19 DIAGNOSIS — E87.5 HYPERKALEMIA: ICD-10-CM

## 2023-12-19 DIAGNOSIS — R53.83 OTHER FATIGUE: ICD-10-CM

## 2023-12-19 DIAGNOSIS — Z29.9 ENCOUNTER FOR PROPHYLACTIC MEASURES, UNSPECIFIED: ICD-10-CM

## 2023-12-19 DIAGNOSIS — N18.6 END STAGE RENAL DISEASE: ICD-10-CM

## 2023-12-19 DIAGNOSIS — I10 ESSENTIAL (PRIMARY) HYPERTENSION: ICD-10-CM

## 2023-12-19 DIAGNOSIS — K92.1 MELENA: ICD-10-CM

## 2023-12-19 LAB
ALBUMIN SERPL ELPH-MCNC: 3.9 G/DL — SIGNIFICANT CHANGE UP (ref 3.3–5)
ALBUMIN SERPL ELPH-MCNC: 3.9 G/DL — SIGNIFICANT CHANGE UP (ref 3.3–5)
ALP SERPL-CCNC: 60 U/L — SIGNIFICANT CHANGE UP (ref 40–120)
ALP SERPL-CCNC: 60 U/L — SIGNIFICANT CHANGE UP (ref 40–120)
ALT FLD-CCNC: 13 U/L — SIGNIFICANT CHANGE UP (ref 10–45)
ALT FLD-CCNC: 13 U/L — SIGNIFICANT CHANGE UP (ref 10–45)
ANION GAP SERPL CALC-SCNC: 17 MMOL/L — SIGNIFICANT CHANGE UP (ref 5–17)
AST SERPL-CCNC: 20 U/L — SIGNIFICANT CHANGE UP (ref 10–40)
AST SERPL-CCNC: 20 U/L — SIGNIFICANT CHANGE UP (ref 10–40)
BASE EXCESS BLDV CALC-SCNC: -6.7 MMOL/L — LOW (ref -2–3)
BASE EXCESS BLDV CALC-SCNC: -6.7 MMOL/L — LOW (ref -2–3)
BILIRUB SERPL-MCNC: 2.4 MG/DL — HIGH (ref 0.2–1.2)
BILIRUB SERPL-MCNC: 2.4 MG/DL — HIGH (ref 0.2–1.2)
BLD GP AB SCN SERPL QL: NEGATIVE — SIGNIFICANT CHANGE UP
BLD GP AB SCN SERPL QL: NEGATIVE — SIGNIFICANT CHANGE UP
BUN SERPL-MCNC: 54 MG/DL — HIGH (ref 7–23)
BUN SERPL-MCNC: 54 MG/DL — HIGH (ref 7–23)
BUN SERPL-MCNC: 56 MG/DL — HIGH (ref 7–23)
BUN SERPL-MCNC: 56 MG/DL — HIGH (ref 7–23)
C DIFF GDH STL QL: NEGATIVE — SIGNIFICANT CHANGE UP
C DIFF GDH STL QL: NEGATIVE — SIGNIFICANT CHANGE UP
C DIFF GDH STL QL: SIGNIFICANT CHANGE UP
C DIFF GDH STL QL: SIGNIFICANT CHANGE UP
CA-I SERPL-SCNC: 1.17 MMOL/L — SIGNIFICANT CHANGE UP (ref 1.15–1.33)
CA-I SERPL-SCNC: 1.17 MMOL/L — SIGNIFICANT CHANGE UP (ref 1.15–1.33)
CALCIUM SERPL-MCNC: 8.6 MG/DL — SIGNIFICANT CHANGE UP (ref 8.4–10.5)
CALCIUM SERPL-MCNC: 8.6 MG/DL — SIGNIFICANT CHANGE UP (ref 8.4–10.5)
CALCIUM SERPL-MCNC: 8.8 MG/DL — SIGNIFICANT CHANGE UP (ref 8.4–10.5)
CALCIUM SERPL-MCNC: 8.8 MG/DL — SIGNIFICANT CHANGE UP (ref 8.4–10.5)
CHLORIDE BLDV-SCNC: 103 MMOL/L — SIGNIFICANT CHANGE UP (ref 96–108)
CHLORIDE BLDV-SCNC: 103 MMOL/L — SIGNIFICANT CHANGE UP (ref 96–108)
CHLORIDE SERPL-SCNC: 100 MMOL/L — SIGNIFICANT CHANGE UP (ref 96–108)
CHLORIDE SERPL-SCNC: 100 MMOL/L — SIGNIFICANT CHANGE UP (ref 96–108)
CHLORIDE SERPL-SCNC: 99 MMOL/L — SIGNIFICANT CHANGE UP (ref 96–108)
CHLORIDE SERPL-SCNC: 99 MMOL/L — SIGNIFICANT CHANGE UP (ref 96–108)
CO2 BLDV-SCNC: 19 MMOL/L — LOW (ref 22–26)
CO2 BLDV-SCNC: 19 MMOL/L — LOW (ref 22–26)
CO2 SERPL-SCNC: 17 MMOL/L — LOW (ref 22–31)
CREAT SERPL-MCNC: 10.01 MG/DL — HIGH (ref 0.5–1.3)
CREAT SERPL-MCNC: 10.01 MG/DL — HIGH (ref 0.5–1.3)
CREAT SERPL-MCNC: 9.64 MG/DL — HIGH (ref 0.5–1.3)
CREAT SERPL-MCNC: 9.64 MG/DL — HIGH (ref 0.5–1.3)
EGFR: 4 ML/MIN/1.73M2 — LOW
GAS PNL BLDV: 132 MMOL/L — LOW (ref 136–145)
GAS PNL BLDV: 132 MMOL/L — LOW (ref 136–145)
GAS PNL BLDV: SIGNIFICANT CHANGE UP
GI PCR PANEL: SIGNIFICANT CHANGE UP
GI PCR PANEL: SIGNIFICANT CHANGE UP
GLUCOSE BLDV-MCNC: 74 MG/DL — SIGNIFICANT CHANGE UP (ref 70–99)
GLUCOSE BLDV-MCNC: 74 MG/DL — SIGNIFICANT CHANGE UP (ref 70–99)
GLUCOSE SERPL-MCNC: 79 MG/DL — SIGNIFICANT CHANGE UP (ref 70–99)
GLUCOSE SERPL-MCNC: 79 MG/DL — SIGNIFICANT CHANGE UP (ref 70–99)
GLUCOSE SERPL-MCNC: 88 MG/DL — SIGNIFICANT CHANGE UP (ref 70–99)
GLUCOSE SERPL-MCNC: 88 MG/DL — SIGNIFICANT CHANGE UP (ref 70–99)
HCO3 BLDV-SCNC: 18 MMOL/L — LOW (ref 22–29)
HCO3 BLDV-SCNC: 18 MMOL/L — LOW (ref 22–29)
HCT VFR BLD CALC: 25.7 % — LOW (ref 34.5–45)
HCT VFR BLD CALC: 25.7 % — LOW (ref 34.5–45)
HCT VFR BLD CALC: 26.2 % — LOW (ref 34.5–45)
HCT VFR BLD CALC: 26.2 % — LOW (ref 34.5–45)
HCT VFR BLDA CALC: 27 % — LOW (ref 34.5–46.5)
HCT VFR BLDA CALC: 27 % — LOW (ref 34.5–46.5)
HGB BLD CALC-MCNC: 9 G/DL — LOW (ref 11.7–16.1)
HGB BLD CALC-MCNC: 9 G/DL — LOW (ref 11.7–16.1)
HGB BLD-MCNC: 8 G/DL — LOW (ref 11.5–15.5)
HGB BLD-MCNC: 8 G/DL — LOW (ref 11.5–15.5)
HGB BLD-MCNC: 8.1 G/DL — LOW (ref 11.5–15.5)
HGB BLD-MCNC: 8.1 G/DL — LOW (ref 11.5–15.5)
LACTATE BLDV-MCNC: 1 MMOL/L — SIGNIFICANT CHANGE UP (ref 0.5–2)
LACTATE BLDV-MCNC: 1 MMOL/L — SIGNIFICANT CHANGE UP (ref 0.5–2)
LDH SERPL L TO P-CCNC: 380 U/L — HIGH (ref 50–242)
LDH SERPL L TO P-CCNC: 380 U/L — HIGH (ref 50–242)
LIDOCAIN IGE QN: 38 U/L — SIGNIFICANT CHANGE UP (ref 7–60)
LIDOCAIN IGE QN: 38 U/L — SIGNIFICANT CHANGE UP (ref 7–60)
MCHC RBC-ENTMCNC: 29.9 PG — SIGNIFICANT CHANGE UP (ref 27–34)
MCHC RBC-ENTMCNC: 29.9 PG — SIGNIFICANT CHANGE UP (ref 27–34)
MCHC RBC-ENTMCNC: 30 PG — SIGNIFICANT CHANGE UP (ref 27–34)
MCHC RBC-ENTMCNC: 30 PG — SIGNIFICANT CHANGE UP (ref 27–34)
MCHC RBC-ENTMCNC: 30.5 GM/DL — LOW (ref 32–36)
MCHC RBC-ENTMCNC: 30.5 GM/DL — LOW (ref 32–36)
MCHC RBC-ENTMCNC: 31.5 GM/DL — LOW (ref 32–36)
MCHC RBC-ENTMCNC: 31.5 GM/DL — LOW (ref 32–36)
MCV RBC AUTO: 95.2 FL — SIGNIFICANT CHANGE UP (ref 80–100)
MCV RBC AUTO: 95.2 FL — SIGNIFICANT CHANGE UP (ref 80–100)
MCV RBC AUTO: 97.8 FL — SIGNIFICANT CHANGE UP (ref 80–100)
MCV RBC AUTO: 97.8 FL — SIGNIFICANT CHANGE UP (ref 80–100)
NRBC # BLD: 0 /100 WBCS — SIGNIFICANT CHANGE UP (ref 0–0)
NRBC # BLD: 0 /100 WBCS — SIGNIFICANT CHANGE UP (ref 0–0)
NRBC # BLD: 1 /100 WBCS — HIGH (ref 0–0)
NRBC # BLD: 1 /100 WBCS — HIGH (ref 0–0)
PCO2 BLDV: 33 MMHG — LOW (ref 39–42)
PCO2 BLDV: 33 MMHG — LOW (ref 39–42)
PH BLDV: 7.35 — SIGNIFICANT CHANGE UP (ref 7.32–7.43)
PH BLDV: 7.35 — SIGNIFICANT CHANGE UP (ref 7.32–7.43)
PLATELET # BLD AUTO: 83 K/UL — LOW (ref 150–400)
PLATELET # BLD AUTO: 83 K/UL — LOW (ref 150–400)
PLATELET # BLD AUTO: 86 K/UL — LOW (ref 150–400)
PLATELET # BLD AUTO: 86 K/UL — LOW (ref 150–400)
PO2 BLDV: 48 MMHG — HIGH (ref 25–45)
PO2 BLDV: 48 MMHG — HIGH (ref 25–45)
POTASSIUM BLDV-SCNC: 5.4 MMOL/L — HIGH (ref 3.5–5.1)
POTASSIUM BLDV-SCNC: 5.4 MMOL/L — HIGH (ref 3.5–5.1)
POTASSIUM SERPL-MCNC: 4.9 MMOL/L — SIGNIFICANT CHANGE UP (ref 3.5–5.3)
POTASSIUM SERPL-MCNC: 4.9 MMOL/L — SIGNIFICANT CHANGE UP (ref 3.5–5.3)
POTASSIUM SERPL-MCNC: 5.2 MMOL/L — SIGNIFICANT CHANGE UP (ref 3.5–5.3)
POTASSIUM SERPL-MCNC: 5.2 MMOL/L — SIGNIFICANT CHANGE UP (ref 3.5–5.3)
POTASSIUM SERPL-SCNC: 4.9 MMOL/L — SIGNIFICANT CHANGE UP (ref 3.5–5.3)
POTASSIUM SERPL-SCNC: 4.9 MMOL/L — SIGNIFICANT CHANGE UP (ref 3.5–5.3)
POTASSIUM SERPL-SCNC: 5.2 MMOL/L — SIGNIFICANT CHANGE UP (ref 3.5–5.3)
POTASSIUM SERPL-SCNC: 5.2 MMOL/L — SIGNIFICANT CHANGE UP (ref 3.5–5.3)
PROCALCITONIN SERPL-MCNC: 3.07 NG/ML — HIGH (ref 0.02–0.1)
PROCALCITONIN SERPL-MCNC: 3.07 NG/ML — HIGH (ref 0.02–0.1)
PROT SERPL-MCNC: 7 G/DL — SIGNIFICANT CHANGE UP (ref 6–8.3)
PROT SERPL-MCNC: 7 G/DL — SIGNIFICANT CHANGE UP (ref 6–8.3)
RBC # BLD: 2.68 M/UL — LOW (ref 3.8–5.2)
RBC # BLD: 2.68 M/UL — LOW (ref 3.8–5.2)
RBC # BLD: 2.7 M/UL — LOW (ref 3.8–5.2)
RBC # BLD: 2.7 M/UL — LOW (ref 3.8–5.2)
RBC # FLD: 19.7 % — HIGH (ref 10.3–14.5)
RBC # FLD: 19.7 % — HIGH (ref 10.3–14.5)
RBC # FLD: 19.9 % — HIGH (ref 10.3–14.5)
RBC # FLD: 19.9 % — HIGH (ref 10.3–14.5)
RH IG SCN BLD-IMP: POSITIVE — SIGNIFICANT CHANGE UP
RH IG SCN BLD-IMP: POSITIVE — SIGNIFICANT CHANGE UP
SAO2 % BLDV: 73 % — SIGNIFICANT CHANGE UP (ref 67–88)
SAO2 % BLDV: 73 % — SIGNIFICANT CHANGE UP (ref 67–88)
SODIUM SERPL-SCNC: 133 MMOL/L — LOW (ref 135–145)
SODIUM SERPL-SCNC: 133 MMOL/L — LOW (ref 135–145)
SODIUM SERPL-SCNC: 134 MMOL/L — LOW (ref 135–145)
SODIUM SERPL-SCNC: 134 MMOL/L — LOW (ref 135–145)
WBC # BLD: 4.9 K/UL — SIGNIFICANT CHANGE UP (ref 3.8–10.5)
WBC # BLD: 4.9 K/UL — SIGNIFICANT CHANGE UP (ref 3.8–10.5)
WBC # BLD: 5.89 K/UL — SIGNIFICANT CHANGE UP (ref 3.8–10.5)
WBC # BLD: 5.89 K/UL — SIGNIFICANT CHANGE UP (ref 3.8–10.5)
WBC # FLD AUTO: 4.9 K/UL — SIGNIFICANT CHANGE UP (ref 3.8–10.5)
WBC # FLD AUTO: 4.9 K/UL — SIGNIFICANT CHANGE UP (ref 3.8–10.5)
WBC # FLD AUTO: 5.89 K/UL — SIGNIFICANT CHANGE UP (ref 3.8–10.5)
WBC # FLD AUTO: 5.89 K/UL — SIGNIFICANT CHANGE UP (ref 3.8–10.5)

## 2023-12-19 PROCEDURE — 99223 1ST HOSP IP/OBS HIGH 75: CPT

## 2023-12-19 PROCEDURE — 99222 1ST HOSP IP/OBS MODERATE 55: CPT | Mod: GC

## 2023-12-19 PROCEDURE — 99223 1ST HOSP IP/OBS HIGH 75: CPT | Mod: GC

## 2023-12-19 PROCEDURE — 76700 US EXAM ABDOM COMPLETE: CPT | Mod: 26

## 2023-12-19 RX ORDER — GABAPENTIN 400 MG/1
100 CAPSULE ORAL AT BEDTIME
Refills: 0 | Status: DISCONTINUED | OUTPATIENT
Start: 2023-12-19 | End: 2023-12-22

## 2023-12-19 RX ORDER — ERYTHROPOIETIN 10000 [IU]/ML
20000 INJECTION, SOLUTION INTRAVENOUS; SUBCUTANEOUS ONCE
Refills: 0 | Status: DISCONTINUED | OUTPATIENT
Start: 2023-12-19 | End: 2023-12-19

## 2023-12-19 RX ORDER — DANAZOL 200 MG/1
200 CAPSULE ORAL THREE TIMES A DAY
Refills: 0 | Status: DISCONTINUED | OUTPATIENT
Start: 2023-12-19 | End: 2023-12-22

## 2023-12-19 RX ORDER — LOPERAMIDE HCL 2 MG
2 TABLET ORAL ONCE
Refills: 0 | Status: DISCONTINUED | OUTPATIENT
Start: 2023-12-19 | End: 2023-12-19

## 2023-12-19 RX ORDER — PANTOPRAZOLE SODIUM 20 MG/1
40 TABLET, DELAYED RELEASE ORAL
Refills: 0 | Status: DISCONTINUED | OUTPATIENT
Start: 2023-12-19 | End: 2023-12-22

## 2023-12-19 RX ADMIN — PANTOPRAZOLE SODIUM 40 MILLIGRAM(S): 20 TABLET, DELAYED RELEASE ORAL at 18:12

## 2023-12-19 RX ADMIN — Medication 1 TABLET(S): at 12:42

## 2023-12-19 RX ADMIN — GABAPENTIN 100 MILLIGRAM(S): 400 CAPSULE ORAL at 23:33

## 2023-12-19 RX ADMIN — DANAZOL 200 MILLIGRAM(S): 200 CAPSULE ORAL at 13:23

## 2023-12-19 RX ADMIN — PIPERACILLIN AND TAZOBACTAM 200 GRAM(S): 4; .5 INJECTION, POWDER, LYOPHILIZED, FOR SOLUTION INTRAVENOUS at 01:09

## 2023-12-19 NOTE — CONSULT NOTE ADULT - ASSESSMENT
63F with hx of noncirrhotic gastric varices, polycythemia vera and secondary myelofibrosis, ESRD on HD M/W/F, choledocholithiasis s/p biliary stent 9/2022 and removal 10/2022 s/p cholecystectomy 1/2023 presenting with diarrhea and syncope    Impression  #diarrhea, non-bloody; brown stools; ddx include infectious diarrhea vs. less likely ischemic (no hematochezia or hypotension) vs. inflammatory bowel disease vs. bile salt diarrhea (time course of vikas ~1 year ago does not fit the acute diarrhea)  - Hgb stable ~7-8 which is her baseline; vital signs stable  - CT A/P NC with normal bowel wall; no signs of colitis  #syncope  #ESRD on MWF HD  #s/p cholecystectomy 1/2023    Recommendations  - GI PCR  - stool studies  - trend H&H; low suspicion for active bleeding at this time  - monitor for signs of bleeding  - no plans for endoscopic evaluation at this time    Note and recommendations are incomplete until reviewed and attested by attending.    Yosef Hernandez MD  GI/Hepatology Fellow, PGY4  Teams preferred (7AM to 5PM); after 5PM, call GI fellow on call    NEW CONSULTS:  Please email [email or web address] with hx of noncirrhotic gastric varices, polycythemia vera and secondary myelofibrosis, ESRD on HD M/W/F, choledocholithiasis s/p biliary stent 9/2022 and removal 10/2022 s/p cholecystectomy 1/2023 presenting with diarrhea and syncope    Impression  #diarrhea, non-bloody; brown stools; ddx include infectious diarrhea vs. less likely ischemic (no hematochezia or hypotension) vs. inflammatory bowel disease vs. bile salt diarrhea (time course of vikas ~1 year ago does not fit the acute diarrhea)  - Hgb stable ~7-8 which is her baseline; vital signs stable  - CT A/P NC with normal bowel wall; no signs of colitis  #syncope  #ESRD on MWF HD  #s/p cholecystectomy 1/2023    Recommendations  - GI PCR  - stool studies  - trend H&H; low suspicion for active bleeding at this time  - monitor for signs of bleeding  - no plans for endoscopic evaluation at this time    Note and recommendations are incomplete until reviewed and attested by attending.    Yosef Hernandez MD  GI/Hepatology Fellow, PGY4  Teams preferred (7AM to 5PM); after 5PM, call GI fellow on call    NEW CONSULTS:  Please email [email or web address]

## 2023-12-19 NOTE — H&P ADULT - PROBLEM SELECTOR PLAN 1
on HD M/W/F, last HD on Friday   Nephrologist Dr De León  - nephro c/s for urgent HD here Fatigue that has been going on for several days. Can be 2/2 missed HD, vs anemia, vs infection vs electrolyte abnormalities  - ctm BMP  - f/u bcx, ucx  - cxr negative  - afebrile and no wbc here  - HD per nephro

## 2023-12-19 NOTE — CONSULT NOTE ADULT - PROBLEM SELECTOR RECOMMENDATION 9
ESRD MWF, missed HD on 12/18. Last outpatient HD on 12/15. Stated she typically gets ~2L UF. Her dry weight is 57.5kg, but her standing weight here is 58.5kg and that is despite having missed HD. Patient also hypotensive. She is likely hypovolemic, which contributed to syncopal episode. K 6.0 (hemolyzed but she was shifted in ED), repeat K 5.4. Will do HD today (12/19) with 0UF.    Anemia: Pt has hx of polycythemia and myelofibrosis, on Jakafi and EPO, hgb at baseline ~8.0. Continue EPO 20,000u with HD. Sees heme outpatient.    CKD-MBD: Check phos and PTH, if on binders at home then please continue      Deedee Tse DO  Nephrology Fellow  Feel free to contact me directly on TEAMS with any questions.  (After 5pm or on weekends, please call the on-call fellow).

## 2023-12-19 NOTE — H&P ADULT - ATTENDING COMMENTS
63 year old female with h/o myelofibrosis, polycythemia vera, ESRD on MWF via LUE AVF, Portal HTN (c/b Isolated Gastric Varices), Gastric varices, gallstones S/P cholecystectomy , ERCP 9/22/2022 for sludge and stones s/p covered metal biliary stent 10 mm x 4 cm c/b post ERCP pancreatitis  who presents to the hospital with 2 days of watery diarrhea which started as black, brown, and now green.  Patient was in usual health with the diarrhea, fatigue, dizziness and syncope of 2 sec as per patient.  NO known sick contact and no fever at home , no hematemesis and no abd pain. In he ED, patient remains hemodynamically stable, ZOsyn , IV tylenol and treatment for hyperkalemia wih resolution of hyperkalemia.  Patient is admitted for further evaluation.    POs recent ED visit for abdominal and Chest pain an cough   # Syncope / fatigue , dizziness and melenic stools in pt with known portal HTN with varices    --> closely monitor patient and hemodynamics, Orthostatic vitals , CBC every 12 hours , protonix and GI evaluation   --> Syncope is likely from possible symptomatic anemia , but will keep in Tele for one day to r/o any arrythmia   -> Type and screen , anemia work up   # Diarrhea      could be from GIB ( cathartic ), Vs food poisoning or ABX      will get GI PCR, stool CX and monitor   # ESHR on HD      nephro eval   # Elevated Procal and fatigue and Syncope     --> high Procal could be due to ESRD, but would F/UP blood CX for Bacteremia in pt with Anterior chest PORT in place     f/up blood CX     rest ia as per above   Discuss with the medical resident Portuguese  : Danya  #[id number]      63 year old female with h/o myelofibrosis, polycythemia vera, ESRD on MWF via LUE AVF, Portal HTN (c/b Isolated Gastric Varices), Gastric varices, gallstones S/P cholecystectomy , ERCP 9/22/2022 for sludge and stones s/p covered metal biliary stent 10 mm x 4 cm c/b post ERCP pancreatitis  who presents to the hospital with 2 days of watery diarrhea which started as black, brown, and now green.  Patient was in usual health with the diarrhea, fatigue, dizziness and syncope of 2 sec as per patient.  NO known sick contact and no fever at home , no hematemesis and no abd pain. In he ED, patient remains hemodynamically stable, ZOsyn , IV tylenol and treatment for hyperkalemia wih resolution of hyperkalemia.  Patient is admitted for further evaluation.    POs recent ED visit for abdominal and Chest pain an cough   # Syncope / fatigue , dizziness and melenic stools in pt with known portal HTN with varices    --> closely monitor patient and hemodynamics, Orthostatic vitals , CBC every 12 hours , protonix and GI evaluation   --> Syncope is likely from possible symptomatic anemia , but will keep in Tele for one day to r/o any arrythmia   -> Type and screen , anemia work up   # Diarrhea      could be from GIB ( cathartic ), Vs food poisoning or ABX      will get GI PCR, stool CX and monitor , C diff toxin ( recently received Amoxicillin  # ESHR on HD      nephro eval   # Elevated Procal and fatigue and Syncope     --> high Procal could be due to ESRD, but would F/UP blood CX for Bacteremia in pt with Anterior chest PORT in place     f/up blood CX     rest ia as per above   Discuss with the medical resident Wolof  : Danya  #[id number]      63 year old female with h/o myelofibrosis, polycythemia vera, ESRD on MWF via LUE AVF, Portal HTN (c/b Isolated Gastric Varices), Gastric varices, gallstones S/P cholecystectomy , ERCP 9/22/2022 for sludge and stones s/p covered metal biliary stent 10 mm x 4 cm c/b post ERCP pancreatitis  who presents to the hospital with 2 days of watery diarrhea which started as black, brown, and now green.  Patient was in usual health with the diarrhea, fatigue, dizziness and syncope of 2 sec as per patient.  NO known sick contact and no fever at home , no hematemesis and no abd pain. In he ED, patient remains hemodynamically stable, ZOsyn , IV tylenol and treatment for hyperkalemia wih resolution of hyperkalemia.  Patient is admitted for further evaluation.    POs recent ED visit for abdominal and Chest pain an cough   # Syncope / fatigue , dizziness and melenic stools in pt with known portal HTN with varices    --> closely monitor patient and hemodynamics, Orthostatic vitals , CBC every 12 hours , protonix and GI evaluation   --> Syncope is likely from possible symptomatic anemia , but will keep in Tele for one day to r/o any arrythmia   -> Type and screen , anemia work up   # Diarrhea      could be from GIB ( cathartic ), Vs food poisoning or ABX      will get GI PCR, stool CX and monitor , C diff toxin ( recently received Amoxicillin  # ESHR on HD      nephro eval   # Elevated Procal and fatigue and Syncope     --> high Procal could be due to ESRD, but would F/UP blood CX for Bacteremia in pt with Anterior chest PORT in place     f/up blood CX     rest ia as per above   Discuss with the medical resident

## 2023-12-19 NOTE — ED ADULT NURSE REASSESSMENT NOTE - NS ED NURSE REASSESS COMMENT FT1
Report received from DARREN Peters . Pt AAOx4, NAD, resp nonlabored, skin warm/dry, resting comfortably in bed with family. Pt presented to ED c/o weakness . Pt denies headache, dizziness, chest pain, palpitations, SOB, abd pain, n/v/d, urinary symptoms, fevers, chills at this time. Pt had port accessed overnight with placement confirmed with x-ray. Port does not pull back for blood return but flushes well. Pt agitated about being stuck again. Pt butterflied for morning labs. Pt is well appearing and is able to ambulate to bathroom. Pt awaiting bed assignment . Safety maintained.

## 2023-12-19 NOTE — ED ADULT NURSE REASSESSMENT NOTE - NS ED NURSE REASSESS COMMENT FT1
Pt refusing lab draw at this time. Port does not pull back but confirmed with x-ray. Pt educated on risks- pt refuses butterfly for blood draw. MD made aware

## 2023-12-19 NOTE — ED ADULT NURSE REASSESSMENT NOTE - NS ED NURSE REASSESS COMMENT FT1
Received report from DARREN Cole. Pt AOx4. Comfort care and safety measures provided. Pt admitted, pending bed. ACP to be made aware of HR. Pt endorses intermittent palpitations- no palpitations currently on initial examination.

## 2023-12-19 NOTE — CONSULT NOTE ADULT - ATTENDING COMMENTS
64 yo F pmh PCV c/b 2ndary myelofibrosis, ESRD on HD, Portal HTN c/b gastric varices, thrombocytopenia, and splenomegaly (negative liver bx for cirrhosis) presents for weakness/diziness in setting of profuse diarrhea that is new.  Was reported as darker/black.  Hgb stable at baseline of anemia and no sign of red blood.  Procal is elevated.  Stool studies are pending as suspect this is likely infectious in etiology. Do not suspect GIB at this time.    Agree with stool studies  Hold abx unless unstable (pending studies)  No role for endoscopic evaluation at this time    GI to follow 62 yo F pmh PCV c/b 2ndary myelofibrosis, ESRD on HD, Portal HTN c/b gastric varices, thrombocytopenia, and splenomegaly (negative liver bx for cirrhosis) presents for weakness/diziness in setting of profuse diarrhea that is new.  Was reported as darker/black.  Hgb stable at baseline of anemia and no sign of red blood.  Procal is elevated.  Stool studies are pending as suspect this is likely infectious in etiology. Do not suspect GIB at this time.    Agree with stool studies  Hold abx unless unstable (pending studies)  No role for endoscopic evaluation at this time    GI to follow

## 2023-12-19 NOTE — H&P ADULT - PROBLEM SELECTOR PLAN 2
hold home meds i/s/o normal BP on HD M/W/F, last HD on Friday   Nephrologist Dr De León  - nephro c/s for urgent HD here

## 2023-12-19 NOTE — CONSULT NOTE ADULT - SUBJECTIVE AND OBJECTIVE BOX
Chief Complaint:  Patient is a 63y old  Female who presents with a chief complaint of weakness , dizziness , syncope and diarrhea (19 Dec 2023 07:57)    HPI:  63F with hx of noncirrhotic gastric varices, hypertension, polycythemia vera and secondary myelofibrosis, splenomegaly, ESRD on HD M/W/F, last HD on Friday here for evaluation of weakness, dizziness and missed HD. She say she was waiting to get HD yesterday when she felt weak and had an episode of LoC for "a few seconds." She was brought to the ED. Patient states that she has not been feeling well since last night.  She thinks she had a subjective fever prior to presentation.  Also notes she has been having diarrhea which was black and started on Sunday with more than 15 episodes; since then, has had brown/green loose, watery stools.  No Nausea, vomiting, or abdominal pain. Has not eaten at restaurants recently. No known sick contacts. Last colonoscopy 5 years ago which was normal; last EGD 10/2022 for stent removal; prior to that, underwent ERCP 9/2022 for biliary stent placement and noted to have isolated gastric varix; non-bleeding.    Allergies:  No Known Allergies      Home Medications:    Hospital Medications:  danazol 200 milliGRAM(s) Oral three times a day  gabapentin 100 milliGRAM(s) Oral at bedtime  multivitamin 1 Tablet(s) Oral daily  pantoprazole  Injectable 40 milliGRAM(s) IV Push two times a day      PMHX/PSHX:  Polycythemia vera    Peptic ulcer disease    Hypertension    Splenomegaly    Splenic infarct    ESRD on peritoneal dialysis    Cirrhosis of liver without ascites, unspecified hepatic cirrhosis type    Pancreatic cyst    History of myelofibrosis    History of myelofibrosis    No significant past surgical history    Peritoneal dialysis catheter in place    Hemoperitoneum as complication of peritoneal dialysis    AV fistula        Family history:  No pertinent family history in first degree relatives    Family history of hypertension in mother    Family history of malignant neoplasm (Grandparent)    FH: cirrhosis (Sibling)        Denies family history of colon cancer/polyps, stomach cancer/polyps, pancreatic cancer/masses, liver cancer/disease, ovarian cancer and endometrial cancer.    Social History:     Tob: Denies  EtOH: Denies  Illicit Drugs: Denies    ROS:     General:  No wt loss, fevers, chills  ENT:  No dysphagia  CV:  No pain, palpitations  Pulm:  No dyspnea, cough  GI:  No pain, No nausea, No vomiting, (+) diarrhea, No constipation, No rectal bleeding, No tarry stools, No dysphagia,  Muscle:  No pain, weakness  Neuro:  No weakness  Heme:  No ecchymosis  Skin:  No rash    PHYSICAL EXAM:     GENERAL:  No acute distress  HEENT:  no scleral icterus  CHEST:  no accessory muscle use  HEART:  Regular rate and rhythm  ABDOMEN:  Soft, non-tender, non-distended  EXTREMITIES: No edema  SKIN:  No rash/ecchymoses  NEURO:  Alert and oriented x 3    Vital Signs:  Vital Signs Last 24 Hrs  T(C): 37.1 (19 Dec 2023 17:25), Max: 37.7 (18 Dec 2023 20:00)  T(F): 98.8 (19 Dec 2023 17:25), Max: 99.8 (18 Dec 2023 20:00)  HR: 80 (19 Dec 2023 18:16) (77 - 93)  BP: 140/80 (19 Dec 2023 18:16) (106/53 - 141/74)  BP(mean): --  RR: 18 (19 Dec 2023 18:16) (17 - 18)  SpO2: 100% (19 Dec 2023 18:16) (97% - 100%)    Parameters below as of 19 Dec 2023 18:16  Patient On (Oxygen Delivery Method): room air      Daily     Daily     LABS:                        8.1    4.90  )-----------( 86       ( 19 Dec 2023 18:22 )             25.7     Mean Cell Volume: 95.2 fl (12-19-23 @ 18:22)    12-19    134<L>  |  100  |  56<H>  ----------------------------<  79  5.2   |  17<L>  |  10.01<H>    Ca    8.8      19 Dec 2023 08:28    TPro  7.0  /  Alb  3.9  /  TBili  2.4<H>  /  DBili  x   /  AST  20  /  ALT  13  /  AlkPhos  60  12-19    LIVER FUNCTIONS - ( 19 Dec 2023 08:28 )  Alb: 3.9 g/dL / Pro: 7.0 g/dL / ALK PHOS: 60 U/L / ALT: 13 U/L / AST: 20 U/L / GGT: x           PT/INR - ( 18 Dec 2023 19:43 )   PT: 13.8 sec;   INR: 1.33 ratio         PTT - ( 18 Dec 2023 19:43 )  PTT:30.3 sec  Urinalysis Basic - ( 19 Dec 2023 08:28 )    Color: x / Appearance: x / SG: x / pH: x  Gluc: 79 mg/dL / Ketone: x  / Bili: x / Urobili: x   Blood: x / Protein: x / Nitrite: x   Leuk Esterase: x / RBC: x / WBC x   Sq Epi: x / Non Sq Epi: x / Bacteria: x      Amylase Serum--      Lipase serum38       Ammonia--                          8.1    4.90  )-----------( 86       ( 19 Dec 2023 18:22 )             25.7                         8.0    5.89  )-----------( 83       ( 19 Dec 2023 08:28 )             26.2                         8.2    7.69  )-----------( 111      ( 18 Dec 2023 19:43 )             26.7

## 2023-12-19 NOTE — CONSULT NOTE ADULT - ATTENDING COMMENTS
ESRD on HD, M/W/F  missed dialysis yesterday due to lightheadedness, possible syncope  this in setting of several days diarrhea and not eating  low bp in ed  only 1 kg above dry weight despite no dialysis almost 4 days (57.5->58.4)  no edema    hyperkalemia, needs dialysis today  epo/jackie with hd  remainder per fellow, will follow

## 2023-12-19 NOTE — H&P ADULT - HISTORY OF PRESENT ILLNESS
Patient is a 63-year-old female with a history of hypertension, polycythemia vera and secondary myelofibrosis, splenomegaly, ESRD on HD M/W/, last HD on Friday here for evaluation of weakness, dizziness and missed HD.  Patient states that she has not been feeling well since last night.  She thinks she had a subjective fever earlier today.  She states she felt very dizzy around 5 PM today when going to dialysis.  She states she has been having diarrhea which was black.  She denies any vomiting but reports nausea.  She also reports she has a cough.  No known sick contacts.  She states she still makes urine. Patient states she took tylenol around 4 pm.     In the ED: potassium 6.0 s/p insulin + dextrose and calcium gluconate, abd U/S showed atrophic kidneys, cxr clear. Patient is a 63-year-old female with a history of hypertension, polycythemia vera and secondary myelofibrosis, splenomegaly, ESRD on HD M/W/F, last HD on Friday here for evaluation of weakness, dizziness and missed HD. She say she was waiting to get HD yesterday when she felt weak and had an episode of LoC for "a few seconds." She was brought to the ED. Patient states that she has not been feeling well since last night.  She thinks she had a subjective fever earlier today.  She states she felt very dizzy around 5 PM today when going to dialysis.  She states she has been having diarrhea which was black.  The diarrhea started on Sunday and continued until now but the black color only lasted a day. She denies any vomiting but reports nausea.  She also reports she has a cough with sputum.  No known sick contacts.  She states she still makes urine. Patient states she took tylenol around 4 pm. She states that she regular gets blood transfusions with last transfusion on Dec 1st. Denies any pain, SoB currently.    In the ED: VSS, WBC 5.9, Hb 8 (consistent with baseline), plt 83 (baseline ~110), potassium 6.0 s/p insulin + dextrose and calcium gluconate -> repeat K 5.2, abd U/S showed atrophic kidneys, cxr clear. Patient is a 63-year-old female with a history of noncirrhotic gastric varices, hypertension, polycythemia vera and secondary myelofibrosis, splenomegaly, ESRD on HD M/W/F, last HD on Friday here for evaluation of weakness, dizziness and missed HD. She say she was waiting to get HD yesterday when she felt weak and had an episode of LoC for "a few seconds." She was brought to the ED. Patient states that she has not been feeling well since last night.  She thinks she had a subjective fever earlier today.  She states she felt very dizzy around 5 PM today when going to dialysis.  She states she has been having diarrhea which was black.  The diarrhea started on Sunday and continued until now but the black color only lasted a day. She denies any vomiting but reports nausea.  She also reports she has a cough with sputum.  No known sick contacts.  She states she still makes urine. Patient states she took tylenol around 4 pm. She states that she regular gets blood transfusions with last transfusion on Dec 1st. Denies any pain, SoB currently.    In the ED: VSS, WBC 5.9, Hb 8 (consistent with baseline), plt 83 (baseline ~110), potassium 6.0 s/p insulin + dextrose and calcium gluconate -> repeat K 5.2, abd U/S showed atrophic kidneys, cxr clear.

## 2023-12-19 NOTE — CONSULT NOTE ADULT - SUBJECTIVE AND OBJECTIVE BOX
[facility] DIVISION OF KIDNEY DISEASES AND HYPERTENSION -- [phone]  -- INITIAL CONSULT NOTE  --------------------------------------------------------------------------------  HPI: Patient is a 63-year-old female with ESRD on HD M/W/F who presented to the ED last night (12/18) after experiencing a fainting spell at her dialysis center. Nephrology service was consulted for ESRD management. She also has a history of hypertension, polycythemia vera and secondary myelofibrosis and splenomegaly. She gets HD at [facility] dialysis [place]. Has been on HD for ~6 years, primary nephrologist is Dr. De León, was previously on PD. Dry weight 57.5kg per pt, today 58.5kg (standing weight). She stated she was waiting to get HD yesterday and got up to use the elevator when she felt faint and had an episode of LoC for "a few seconds." Her last HD was on 12/15, which she stated she tolerated well. Patient did report diarrhea since Sunday 12/17 with 5-10 episodes daily, as well as poor PO intake for the past couple of days. Denied recent antibiotic use or any change in meds. Denied SOB, BRBPR, nausea, vomiting, hematemesis. Felt slightly feverish but no home recorded temps. No known sick contacts. She states she still makes 10-20cc urine daily. She states that she regular gets blood transfusions via chest port with last transfusion on Dec 1st.     In the ED: VSS, WBC 5.9, Hb 8 (consistent with baseline), plt 83 (baseline ~110), potassium 6.0 s/p insulin + dextrose and calcium gluconate -> repeat K 5.2, abd U/S showed atrophic kidneys, cxr clear.        PAST HISTORY  --------------------------------------------------------------------------------  PAST MEDICAL & SURGICAL HISTORY:  Polycythemia vera and secondary myelofibrosis  Peptic ulcer disease  Hypertension  Splenomegaly 2015  Splenic infarct treated conservatively 2/2015  Pancreatic cyst  History of myelofibrosis  Hemoperitoneum as complication of peritoneal dialysis s/p removal 9/18  AV fistula Placed 9/18    FAMILY HISTORY:  Family history of hypertension in mother    Family history of malignant neoplasm (Grandparent)  head and neck in father    FH: cirrhosis (Sibling)      PAST SOCIAL HISTORY:    ALLERGIES & MEDICATIONS  --------------------------------------------------------------------------------  Allergies    No Known Allergies    Intolerances      Standing Inpatient Medications  danazol 200 milliGRAM(s) Oral three times a day  epoetin filiberto (EPOGEN) Injectable 54588 Unit(s) IV Push once  gabapentin 100 milliGRAM(s) Oral at bedtime  multivitamin 1 Tablet(s) Oral daily    PRN Inpatient Medications      REVIEW OF SYSTEMS  --------------------------------------------------------------------------------    All other systems were reviewed and are negative, except as noted in HPI    VITALS  --------------------------------------------------------------------------------  T(C): 36.9 (12-19-23 @ 07:38), Max: 37.7 (12-18-23 @ 20:00)  HR: 80 (12-19-23 @ 07:38) (54 - 91)  BP: 106/53 (12-19-23 @ 07:38) (98/67 - 128/82)  RR: 18 (12-19-23 @ 07:38) (16 - 20)  SpO2: 98% (12-19-23 @ 07:38) (97% - 99%)  Wt(kg): --  Height (cm): 156.8 (12-18-23 @ 18:21)  Weight (kg): 59 (12-18-23 @ 18:21)  BMI (kg/m2): 24 (12-18-23 @ 18:21)  BSA (m2): 1.59 (12-18-23 @ 18:21)    PHYSICAL EXAM:  General: no acute distress  Neuro: no focal deficits  HEENT: NC/AT, anicteric, no JVD  Pulmonary: lungs CTA B/L  Cardiovascular/Chest: +S1S2, RRR  GI/Abdomen: soft, NT/ND, +bowel sounds  Extremities: No edema  Skin: Warm and dry  Vascular access: LUE AVF with audible bruit    LABS/STUDIES  --------------------------------------------------------------------------------              8.0    5.89  >-----------<  83       [12-19-23 @ 08:28]              26.2     134  |  100  |  56  ----------------------------<  79      [12-19-23 @ 08:28]  5.2   |  17  |  10.01        Ca     8.8     [12-19-23 @ 08:28]    TPro  7.0  /  Alb  3.9  /  TBili  2.4  /  DBili  x   /  AST  20  /  ALT  13  /  AlkPhos  60  [12-19-23 @ 08:28]    PT/INR: PT 13.8 , INR 1.33       [12-18-23 @ 19:43]  PTT: 30.3       [12-18-23 @ 19:43]      Creatinine Trend:  SCr 10.01 [12-19 @ 08:28]  SCr 9.64 [12-19 @ 01:44]  SCr 9.47 [12-18 @ 21:23]  SCr 9.25 [12-18 @ 19:43]  SCr 4.96 [12-07 @ 01:06]    Urinalysis - [12-19-23 @ 08:28]      Color  / Appearance  / SG  / pH       Gluc 79 / Ketone   / Bili  / Urobili        Blood  / Protein  / Leuk Est  / Nitrite       RBC  / WBC  / Hyaline  / Gran  / Sq Epi  / Non Sq Epi  / Bacteria       HBsAb 14.6      [09-23-22 @ 13:28]  HBsAb Reactive      [07-29-21 @ 00:09]  HBsAg Nonreact      [09-23-22 @ 13:28]  HBcAb Nonreact      [09-23-22 @ 13:28]  HCV 0.12, Nonreact      [09-23-22 @ 13:28]  HIV Nonreact      [04-11-21 @ 08:31]    Syphilis Screen (Treponema Pallidum Ab) Negative      [09-08-21 @ 09:08] [facility] DIVISION OF KIDNEY DISEASES AND HYPERTENSION -- [phone]  -- INITIAL CONSULT NOTE  --------------------------------------------------------------------------------  HPI: Patient is a 63-year-old female with ESRD on HD M/W/F who presented to the ED last night (12/18) after experiencing a fainting spell at her dialysis center. Nephrology service was consulted for ESRD management. She also has a history of hypertension, polycythemia vera and secondary myelofibrosis and splenomegaly. She gets HD at [facility] dialysis [place]. Has been on HD for ~6 years, primary nephrologist is Dr. De León, was previously on PD. Dry weight 57.5kg per pt, today 58.5kg (standing weight). She stated she was waiting to get HD yesterday and got up to use the elevator when she felt faint and had an episode of LoC for "a few seconds." Her last HD was on 12/15, which she stated she tolerated well. Patient did report diarrhea since Sunday 12/17 with 5-10 episodes daily, as well as poor PO intake for the past couple of days. Denied recent antibiotic use or any change in meds. Denied SOB, BRBPR, nausea, vomiting, hematemesis. Felt slightly feverish but no home recorded temps. No known sick contacts. She states she still makes 10-20cc urine daily. She states that she regular gets blood transfusions via chest port with last transfusion on Dec 1st.     In the ED: VSS, WBC 5.9, Hb 8 (consistent with baseline), plt 83 (baseline ~110), potassium 6.0 s/p insulin + dextrose and calcium gluconate -> repeat K 5.2, abd U/S showed atrophic kidneys, cxr clear.        PAST HISTORY  --------------------------------------------------------------------------------  PAST MEDICAL & SURGICAL HISTORY:  Polycythemia vera and secondary myelofibrosis  Peptic ulcer disease  Hypertension  Splenomegaly 2015  Splenic infarct treated conservatively 2/2015  Pancreatic cyst  History of myelofibrosis  Hemoperitoneum as complication of peritoneal dialysis s/p removal 9/18  AV fistula Placed 9/18    FAMILY HISTORY:  Family history of hypertension in mother    Family history of malignant neoplasm (Grandparent)  head and neck in father    FH: cirrhosis (Sibling)      PAST SOCIAL HISTORY:    ALLERGIES & MEDICATIONS  --------------------------------------------------------------------------------  Allergies    No Known Allergies    Intolerances      Standing Inpatient Medications  danazol 200 milliGRAM(s) Oral three times a day  epoetin filiberto (EPOGEN) Injectable 40349 Unit(s) IV Push once  gabapentin 100 milliGRAM(s) Oral at bedtime  multivitamin 1 Tablet(s) Oral daily    PRN Inpatient Medications      REVIEW OF SYSTEMS  --------------------------------------------------------------------------------    All other systems were reviewed and are negative, except as noted in HPI    VITALS  --------------------------------------------------------------------------------  T(C): 36.9 (12-19-23 @ 07:38), Max: 37.7 (12-18-23 @ 20:00)  HR: 80 (12-19-23 @ 07:38) (54 - 91)  BP: 106/53 (12-19-23 @ 07:38) (98/67 - 128/82)  RR: 18 (12-19-23 @ 07:38) (16 - 20)  SpO2: 98% (12-19-23 @ 07:38) (97% - 99%)  Wt(kg): --  Height (cm): 156.8 (12-18-23 @ 18:21)  Weight (kg): 59 (12-18-23 @ 18:21)  BMI (kg/m2): 24 (12-18-23 @ 18:21)  BSA (m2): 1.59 (12-18-23 @ 18:21)    PHYSICAL EXAM:  General: no acute distress  Neuro: no focal deficits  HEENT: NC/AT, anicteric, no JVD  Pulmonary: lungs CTA B/L  Cardiovascular/Chest: +S1S2, RRR  GI/Abdomen: soft, NT/ND, +bowel sounds  Extremities: No edema  Skin: Warm and dry  Vascular access: LUE AVF with audible bruit    LABS/STUDIES  --------------------------------------------------------------------------------              8.0    5.89  >-----------<  83       [12-19-23 @ 08:28]              26.2     134  |  100  |  56  ----------------------------<  79      [12-19-23 @ 08:28]  5.2   |  17  |  10.01        Ca     8.8     [12-19-23 @ 08:28]    TPro  7.0  /  Alb  3.9  /  TBili  2.4  /  DBili  x   /  AST  20  /  ALT  13  /  AlkPhos  60  [12-19-23 @ 08:28]    PT/INR: PT 13.8 , INR 1.33       [12-18-23 @ 19:43]  PTT: 30.3       [12-18-23 @ 19:43]      Creatinine Trend:  SCr 10.01 [12-19 @ 08:28]  SCr 9.64 [12-19 @ 01:44]  SCr 9.47 [12-18 @ 21:23]  SCr 9.25 [12-18 @ 19:43]  SCr 4.96 [12-07 @ 01:06]    Urinalysis - [12-19-23 @ 08:28]      Color  / Appearance  / SG  / pH       Gluc 79 / Ketone   / Bili  / Urobili        Blood  / Protein  / Leuk Est  / Nitrite       RBC  / WBC  / Hyaline  / Gran  / Sq Epi  / Non Sq Epi  / Bacteria       HBsAb 14.6      [09-23-22 @ 13:28]  HBsAb Reactive      [07-29-21 @ 00:09]  HBsAg Nonreact      [09-23-22 @ 13:28]  HBcAb Nonreact      [09-23-22 @ 13:28]  HCV 0.12, Nonreact      [09-23-22 @ 13:28]  HIV Nonreact      [04-11-21 @ 08:31]    Syphilis Screen (Treponema Pallidum Ab) Negative      [09-08-21 @ 09:08]

## 2023-12-19 NOTE — H&P ADULT - TIME BILLING
personally evaluating patient , reviewing and ordering labs, discussing with Nephro and implementing care and discussing with patient

## 2023-12-19 NOTE — CONSULT NOTE ADULT - ASSESSMENT
Patient is a 63-year-old female with ESRD on HD M/W/F who presented to the ED last night (12/18) after experiencing a fainting spell at her dialysis center. Nephrology service was consulted for ESRD management.

## 2023-12-19 NOTE — H&P ADULT - NSHPPHYSICALEXAM_GEN_ALL_CORE
Vital Signs Last 24 Hrs  T(C): 36.9 (19 Dec 2023 07:38), Max: 37.7 (18 Dec 2023 20:00)  T(F): 98.5 (19 Dec 2023 07:38), Max: 99.8 (18 Dec 2023 20:00)  HR: 80 (19 Dec 2023 07:38) (54 - 91)  BP: 106/53 (19 Dec 2023 07:38) (98/67 - 128/82)  BP(mean): --  RR: 18 (19 Dec 2023 07:38) (16 - 20)  SpO2: 98% (19 Dec 2023 07:38) (97% - 99%)    Parameters below as of 19 Dec 2023 07:38  Patient On (Oxygen Delivery Method): room air    PHYSICAL EXAM:  GENERAL: NAD, Resting in bed  HEENT:  Head atraumatic, EOMI, PERRLA, conjunctiva and sclera clear; Moist mucous membranes, normal oropharynx  NECK: Supple, No JVD, no lymphadenopathy, no thyroid nodules or enlargement  CHEST/LUNG: Clear to auscultation bilaterally; No rales, rhonchi, wheezing, or rubs. Unlabored respirations on room air  HEART: Regular rate and rhythm; No murmurs, rubs, or gallops  ABDOMEN: Bowel sounds present; Soft, Nontender, Nondistended. No hepatomegally  EXTREMITIES:  2+ Peripheral Pulses, brisk capillary refill. No clubbing, cyanosis, or edema  NERVOUS SYSTEM:  Alert & Oriented X3, non-focal and spontaneous movements of all extremities  SKIN: No rashes or lesions

## 2023-12-19 NOTE — ED ADULT NURSE REASSESSMENT NOTE - NS ED NURSE REASSESS COMMENT FT1
MD Aguilar to reassess pt at [place] [address] MD Aguilar to reassess pt at [place] [id number]

## 2023-12-19 NOTE — H&P ADULT - PROBLEM SELECTOR PLAN 5
Diet: regular  VTE ppx: non i/s/o ?bleeding  Dispo: medically active Pt says she has had black diarrhea on Sunday and Monday  - Hb stable ctm  - GI c/s Pt says she has had black diarrhea on Sunday and Monday  - Hx of unspecified hepatic cirrhosis diagnosed here Oct 2022  - Hb stable ctm  - GI c/s Pt says she has had black diarrhea on Sunday and Monday  - Hx of unspecified hepatic cirrhosis diagnosed here Oct 2022  - Hb stable ctm cbc q12h  - GI c/s

## 2023-12-19 NOTE — H&P ADULT - ASSESSMENT
Patient is a 63-year-old female with a history of hypertension, polycythemia vera and secondary myelofibrosis, splenomegaly, ESRD on HD M/W/F, last HD on Friday here for evaluation of weakness, dizziness and missed HD.

## 2023-12-19 NOTE — H&P ADULT - PROBLEM SELECTOR PLAN 4
Pt says she has had black diarrhea on Sunday and Monday  - Hb stable ctm  - GI c/s with 2/2 myelofibrosis  Follows with

## 2023-12-19 NOTE — H&P ADULT - NSHPLABSRESULTS_GEN_ALL_CORE
.  LABS:                         8.2    7.69  )-----------( 111      ( 18 Dec 2023 19:43 )             26.7     12-19    133<L>  |  99  |  54<H>  ----------------------------<  88  4.9   |  17<L>  |  9.64<H>    Ca    8.6      19 Dec 2023 01:44    TPro  7.1  /  Alb  4.1  /  TBili  2.7<H>  /  DBili  x   /  AST  21  /  ALT  15  /  AlkPhos  66  12-18    PT/INR - ( 18 Dec 2023 19:43 )   PT: 13.8 sec;   INR: 1.33 ratio         PTT - ( 18 Dec 2023 19:43 )  PTT:30.3 sec  Urinalysis Basic - ( 19 Dec 2023 01:44 )    Color: x / Appearance: x / SG: x / pH: x  Gluc: 88 mg/dL / Ketone: x  / Bili: x / Urobili: x   Blood: x / Protein: x / Nitrite: x   Leuk Esterase: x / RBC: x / WBC x   Sq Epi: x / Non Sq Epi: x / Bacteria: x            RADIOLOGY, EKG & ADDITIONAL TESTS: Reviewed.

## 2023-12-20 DIAGNOSIS — R19.7 DIARRHEA, UNSPECIFIED: ICD-10-CM

## 2023-12-20 LAB
A1C WITH ESTIMATED AVERAGE GLUCOSE RESULT: 4.9 % — SIGNIFICANT CHANGE UP (ref 4–5.6)
A1C WITH ESTIMATED AVERAGE GLUCOSE RESULT: 4.9 % — SIGNIFICANT CHANGE UP (ref 4–5.6)
ALBUMIN SERPL ELPH-MCNC: 3.7 G/DL — SIGNIFICANT CHANGE UP (ref 3.3–5)
ALBUMIN SERPL ELPH-MCNC: 3.7 G/DL — SIGNIFICANT CHANGE UP (ref 3.3–5)
ALP SERPL-CCNC: 57 U/L — SIGNIFICANT CHANGE UP (ref 40–120)
ALP SERPL-CCNC: 57 U/L — SIGNIFICANT CHANGE UP (ref 40–120)
ALT FLD-CCNC: 14 U/L — SIGNIFICANT CHANGE UP (ref 10–45)
ALT FLD-CCNC: 14 U/L — SIGNIFICANT CHANGE UP (ref 10–45)
ANION GAP SERPL CALC-SCNC: 15 MMOL/L — SIGNIFICANT CHANGE UP (ref 5–17)
ANION GAP SERPL CALC-SCNC: 15 MMOL/L — SIGNIFICANT CHANGE UP (ref 5–17)
AST SERPL-CCNC: 20 U/L — SIGNIFICANT CHANGE UP (ref 10–40)
AST SERPL-CCNC: 20 U/L — SIGNIFICANT CHANGE UP (ref 10–40)
BILIRUB SERPL-MCNC: 1.6 MG/DL — HIGH (ref 0.2–1.2)
BILIRUB SERPL-MCNC: 1.6 MG/DL — HIGH (ref 0.2–1.2)
BUN SERPL-MCNC: 25 MG/DL — HIGH (ref 7–23)
BUN SERPL-MCNC: 25 MG/DL — HIGH (ref 7–23)
CALCIUM SERPL-MCNC: 8 MG/DL — LOW (ref 8.4–10.5)
CALCIUM SERPL-MCNC: 8 MG/DL — LOW (ref 8.4–10.5)
CALCIUM SERPL-MCNC: 8.4 MG/DL — SIGNIFICANT CHANGE UP (ref 8.4–10.5)
CALCIUM SERPL-MCNC: 8.4 MG/DL — SIGNIFICANT CHANGE UP (ref 8.4–10.5)
CHLORIDE SERPL-SCNC: 94 MMOL/L — LOW (ref 96–108)
CHLORIDE SERPL-SCNC: 94 MMOL/L — LOW (ref 96–108)
CO2 SERPL-SCNC: 25 MMOL/L — SIGNIFICANT CHANGE UP (ref 22–31)
CO2 SERPL-SCNC: 25 MMOL/L — SIGNIFICANT CHANGE UP (ref 22–31)
CREAT SERPL-MCNC: 6.13 MG/DL — HIGH (ref 0.5–1.3)
CREAT SERPL-MCNC: 6.13 MG/DL — HIGH (ref 0.5–1.3)
CULTURE RESULTS: SIGNIFICANT CHANGE UP
CULTURE RESULTS: SIGNIFICANT CHANGE UP
EGFR: 7 ML/MIN/1.73M2 — LOW
EGFR: 7 ML/MIN/1.73M2 — LOW
ESTIMATED AVERAGE GLUCOSE: 94 MG/DL — SIGNIFICANT CHANGE UP (ref 68–114)
ESTIMATED AVERAGE GLUCOSE: 94 MG/DL — SIGNIFICANT CHANGE UP (ref 68–114)
FERRITIN SERPL-MCNC: 1810 NG/ML — HIGH (ref 13–330)
FERRITIN SERPL-MCNC: 1810 NG/ML — HIGH (ref 13–330)
FOLATE SERPL-MCNC: >20 NG/ML — SIGNIFICANT CHANGE UP
FOLATE SERPL-MCNC: >20 NG/ML — SIGNIFICANT CHANGE UP
GLUCOSE SERPL-MCNC: 64 MG/DL — LOW (ref 70–99)
GLUCOSE SERPL-MCNC: 64 MG/DL — LOW (ref 70–99)
HCT VFR BLD CALC: 22 % — LOW (ref 34.5–45)
HCT VFR BLD CALC: 22 % — LOW (ref 34.5–45)
HCT VFR BLD CALC: 25 % — LOW (ref 34.5–45)
HCT VFR BLD CALC: 25 % — LOW (ref 34.5–45)
HGB BLD-MCNC: 6.9 G/DL — CRITICAL LOW (ref 11.5–15.5)
HGB BLD-MCNC: 6.9 G/DL — CRITICAL LOW (ref 11.5–15.5)
HGB BLD-MCNC: 7.8 G/DL — LOW (ref 11.5–15.5)
HGB BLD-MCNC: 7.8 G/DL — LOW (ref 11.5–15.5)
IRON SATN MFR SERPL: 32 % — SIGNIFICANT CHANGE UP (ref 14–50)
IRON SATN MFR SERPL: 32 % — SIGNIFICANT CHANGE UP (ref 14–50)
IRON SATN MFR SERPL: 48 UG/DL — SIGNIFICANT CHANGE UP (ref 30–160)
IRON SATN MFR SERPL: 48 UG/DL — SIGNIFICANT CHANGE UP (ref 30–160)
MAGNESIUM SERPL-MCNC: 2.2 MG/DL — SIGNIFICANT CHANGE UP (ref 1.6–2.6)
MAGNESIUM SERPL-MCNC: 2.2 MG/DL — SIGNIFICANT CHANGE UP (ref 1.6–2.6)
MCHC RBC-ENTMCNC: 29.2 PG — SIGNIFICANT CHANGE UP (ref 27–34)
MCHC RBC-ENTMCNC: 29.2 PG — SIGNIFICANT CHANGE UP (ref 27–34)
MCHC RBC-ENTMCNC: 29.7 PG — SIGNIFICANT CHANGE UP (ref 27–34)
MCHC RBC-ENTMCNC: 29.7 PG — SIGNIFICANT CHANGE UP (ref 27–34)
MCHC RBC-ENTMCNC: 31.2 GM/DL — LOW (ref 32–36)
MCHC RBC-ENTMCNC: 31.2 GM/DL — LOW (ref 32–36)
MCHC RBC-ENTMCNC: 31.4 GM/DL — LOW (ref 32–36)
MCHC RBC-ENTMCNC: 31.4 GM/DL — LOW (ref 32–36)
MCV RBC AUTO: 93.6 FL — SIGNIFICANT CHANGE UP (ref 80–100)
MCV RBC AUTO: 93.6 FL — SIGNIFICANT CHANGE UP (ref 80–100)
MCV RBC AUTO: 94.8 FL — SIGNIFICANT CHANGE UP (ref 80–100)
MCV RBC AUTO: 94.8 FL — SIGNIFICANT CHANGE UP (ref 80–100)
MRSA PCR RESULT.: SIGNIFICANT CHANGE UP
MRSA PCR RESULT.: SIGNIFICANT CHANGE UP
NRBC # BLD: 0 /100 WBCS — SIGNIFICANT CHANGE UP (ref 0–0)
PHOSPHATE SERPL-MCNC: 4.7 MG/DL — HIGH (ref 2.5–4.5)
PHOSPHATE SERPL-MCNC: 4.7 MG/DL — HIGH (ref 2.5–4.5)
PLATELET # BLD AUTO: 82 K/UL — LOW (ref 150–400)
PLATELET # BLD AUTO: 82 K/UL — LOW (ref 150–400)
PLATELET # BLD AUTO: 93 K/UL — LOW (ref 150–400)
PLATELET # BLD AUTO: 93 K/UL — LOW (ref 150–400)
POTASSIUM SERPL-MCNC: 3.9 MMOL/L — SIGNIFICANT CHANGE UP (ref 3.5–5.3)
POTASSIUM SERPL-MCNC: 3.9 MMOL/L — SIGNIFICANT CHANGE UP (ref 3.5–5.3)
POTASSIUM SERPL-SCNC: 3.9 MMOL/L — SIGNIFICANT CHANGE UP (ref 3.5–5.3)
POTASSIUM SERPL-SCNC: 3.9 MMOL/L — SIGNIFICANT CHANGE UP (ref 3.5–5.3)
PROT SERPL-MCNC: 6.5 G/DL — SIGNIFICANT CHANGE UP (ref 6–8.3)
PROT SERPL-MCNC: 6.5 G/DL — SIGNIFICANT CHANGE UP (ref 6–8.3)
PTH-INTACT FLD-MCNC: 135 PG/ML — HIGH (ref 15–65)
PTH-INTACT FLD-MCNC: 135 PG/ML — HIGH (ref 15–65)
RBC # BLD: 2.32 M/UL — LOW (ref 3.8–5.2)
RBC # BLD: 2.32 M/UL — LOW (ref 3.8–5.2)
RBC # BLD: 2.67 M/UL — LOW (ref 3.8–5.2)
RBC # BLD: 2.67 M/UL — LOW (ref 3.8–5.2)
RBC # FLD: 18.9 % — HIGH (ref 10.3–14.5)
RBC # FLD: 18.9 % — HIGH (ref 10.3–14.5)
RBC # FLD: 19.3 % — HIGH (ref 10.3–14.5)
RBC # FLD: 19.3 % — HIGH (ref 10.3–14.5)
S AUREUS DNA NOSE QL NAA+PROBE: SIGNIFICANT CHANGE UP
S AUREUS DNA NOSE QL NAA+PROBE: SIGNIFICANT CHANGE UP
SODIUM SERPL-SCNC: 134 MMOL/L — LOW (ref 135–145)
SODIUM SERPL-SCNC: 134 MMOL/L — LOW (ref 135–145)
SPECIMEN SOURCE: SIGNIFICANT CHANGE UP
SPECIMEN SOURCE: SIGNIFICANT CHANGE UP
TIBC SERPL-MCNC: 151 UG/DL — LOW (ref 220–430)
TIBC SERPL-MCNC: 151 UG/DL — LOW (ref 220–430)
UIBC SERPL-MCNC: 102 UG/DL — LOW (ref 110–370)
UIBC SERPL-MCNC: 102 UG/DL — LOW (ref 110–370)
VIT B12 SERPL-MCNC: 1661 PG/ML — HIGH (ref 232–1245)
VIT B12 SERPL-MCNC: 1661 PG/ML — HIGH (ref 232–1245)
WBC # BLD: 3.81 K/UL — SIGNIFICANT CHANGE UP (ref 3.8–10.5)
WBC # BLD: 3.81 K/UL — SIGNIFICANT CHANGE UP (ref 3.8–10.5)
WBC # BLD: 3.82 K/UL — SIGNIFICANT CHANGE UP (ref 3.8–10.5)
WBC # BLD: 3.82 K/UL — SIGNIFICANT CHANGE UP (ref 3.8–10.5)
WBC # FLD AUTO: 3.81 K/UL — SIGNIFICANT CHANGE UP (ref 3.8–10.5)
WBC # FLD AUTO: 3.81 K/UL — SIGNIFICANT CHANGE UP (ref 3.8–10.5)
WBC # FLD AUTO: 3.82 K/UL — SIGNIFICANT CHANGE UP (ref 3.8–10.5)
WBC # FLD AUTO: 3.82 K/UL — SIGNIFICANT CHANGE UP (ref 3.8–10.5)

## 2023-12-20 PROCEDURE — 99233 SBSQ HOSP IP/OBS HIGH 50: CPT

## 2023-12-20 PROCEDURE — 99233 SBSQ HOSP IP/OBS HIGH 50: CPT | Mod: GC

## 2023-12-20 RX ORDER — INFLUENZA VIRUS VACCINE 15; 15; 15; 15 UG/.5ML; UG/.5ML; UG/.5ML; UG/.5ML
0.5 SUSPENSION INTRAMUSCULAR ONCE
Refills: 0 | Status: DISCONTINUED | OUTPATIENT
Start: 2023-12-20 | End: 2023-12-22

## 2023-12-20 RX ORDER — CHLORHEXIDINE GLUCONATE 213 G/1000ML
1 SOLUTION TOPICAL
Refills: 0 | Status: DISCONTINUED | OUTPATIENT
Start: 2023-12-20 | End: 2023-12-22

## 2023-12-20 RX ORDER — CEFTRIAXONE 500 MG/1
2000 INJECTION, POWDER, FOR SOLUTION INTRAMUSCULAR; INTRAVENOUS EVERY 24 HOURS
Refills: 0 | Status: DISCONTINUED | OUTPATIENT
Start: 2023-12-20 | End: 2023-12-22

## 2023-12-20 RX ORDER — ACETAMINOPHEN 500 MG
1000 TABLET ORAL ONCE
Refills: 0 | Status: COMPLETED | OUTPATIENT
Start: 2023-12-20 | End: 2023-12-20

## 2023-12-20 RX ADMIN — DANAZOL 200 MILLIGRAM(S): 200 CAPSULE ORAL at 06:38

## 2023-12-20 RX ADMIN — DANAZOL 200 MILLIGRAM(S): 200 CAPSULE ORAL at 15:49

## 2023-12-20 RX ADMIN — DANAZOL 200 MILLIGRAM(S): 200 CAPSULE ORAL at 21:27

## 2023-12-20 RX ADMIN — Medication 1 TABLET(S): at 11:37

## 2023-12-20 RX ADMIN — PANTOPRAZOLE SODIUM 40 MILLIGRAM(S): 20 TABLET, DELAYED RELEASE ORAL at 05:15

## 2023-12-20 RX ADMIN — PANTOPRAZOLE SODIUM 40 MILLIGRAM(S): 20 TABLET, DELAYED RELEASE ORAL at 17:12

## 2023-12-20 RX ADMIN — GABAPENTIN 100 MILLIGRAM(S): 400 CAPSULE ORAL at 21:27

## 2023-12-20 RX ADMIN — DANAZOL 200 MILLIGRAM(S): 200 CAPSULE ORAL at 00:33

## 2023-12-20 RX ADMIN — Medication 400 MILLIGRAM(S): at 01:53

## 2023-12-20 RX ADMIN — CEFTRIAXONE 100 MILLIGRAM(S): 500 INJECTION, POWDER, FOR SOLUTION INTRAMUSCULAR; INTRAVENOUS at 14:56

## 2023-12-20 RX ADMIN — Medication 1000 MILLIGRAM(S): at 05:08

## 2023-12-20 NOTE — PATIENT PROFILE ADULT - FALL HARM RISK - HARM RISK INTERVENTIONS
Assistance with ambulation/Assistance OOB with selected safe patient handling equipment/Communicate Risk of Fall with Harm to all staff/Reinforce activity limits and safety measures with patient and family/Tailored Fall Risk Interventions/Toileting schedule using arm’s reach rule for commode and bathroom/Visual Cue: Yellow wristband and red socks/Bed in lowest position, wheels locked, appropriate side rails in place/Call bell, personal items and telephone in reach/Instruct patient to call for assistance before getting out of bed or chair/Non-slip footwear when patient is out of bed/[place] to call system/Physically safe environment - no spills, clutter or unnecessary equipment/Purposeful Proactive Rounding/Room/bathroom lighting operational, light cord in reach Assistance with ambulation/Assistance OOB with selected safe patient handling equipment/Communicate Risk of Fall with Harm to all staff/Reinforce activity limits and safety measures with patient and family/Tailored Fall Risk Interventions/Toileting schedule using arm’s reach rule for commode and bathroom/Visual Cue: Yellow wristband and red socks/Bed in lowest position, wheels locked, appropriate side rails in place/Call bell, personal items and telephone in reach/Instruct patient to call for assistance before getting out of bed or chair/Non-slip footwear when patient is out of bed/[place] to call system/Physically safe environment - no spills, clutter or unnecessary equipment/Purposeful Proactive Rounding/Room/bathroom lighting operational, light cord in reach

## 2023-12-20 NOTE — PATIENT PROFILE ADULT - NSPROPASSIVESMOKEEXPOSURE_GEN_A_NUR
Pt arrives to ED from cardiology office on recommendation of MD. Pt states MD sited Echo decline and increased fluid build up despite diuretics.     Pt states he has had cough \"for years\" Pt states the cough has been \"up and down for years\" Pt states recently cough is worst with reclining. Pt states he gets SOB more frequently with walking short distance.   Faithfully uses CPAP, no additional O2.   
No

## 2023-12-20 NOTE — PROGRESS NOTE ADULT - SUBJECTIVE AND OBJECTIVE BOX
INTERNAL MEDICINE PROGRESS NOTE  Angel Roberts MD  Please contact via TEAMS    Patient is a 63y old  Female who presents with a chief complaint of weakness , dizziness , syncope and diarrhea (19 Dec 2023 19:05)      SUBJECTIVE / OVERNIGHT EVENTS::  No acute overnight events. Pt seen and examined. Reports 1 episode of diarrhea o/n. Denies fevers, chills, CP, SOB, Abdominal pain, N/V      =================Meds=================  MEDICATIONS  (STANDING):  danazol 200 milliGRAM(s) Oral three times a day  gabapentin 100 milliGRAM(s) Oral at bedtime  influenza   Vaccine 0.5 milliLiter(s) IntraMuscular once  multivitamin 1 Tablet(s) Oral daily  pantoprazole  Injectable 40 milliGRAM(s) IV Push two times a day    MEDICATIONS  (PRN):      I&O's Summary    19 Dec 2023 07:01  -  20 Dec 2023 07:00  --------------------------------------------------------  IN: 0 mL / OUT: 0 mL / NET: 0 mL        =================Vitals=================  Vital Signs Last 24 Hrs  T(C): 36.9 (20 Dec 2023 05:04), Max: 37.1 (19 Dec 2023 17:25)  T(F): 98.5 (20 Dec 2023 05:04), Max: 98.8 (19 Dec 2023 17:25)  HR: 79 (20 Dec 2023 05:04) (79 - 110)  BP: 118/71 (20 Dec 2023 05:04) (114/70 - 141/74)  BP(mean): --  RR: 18 (20 Dec 2023 05:04) (16 - 18)  SpO2: 99% (20 Dec 2023 05:04) (98% - 100%)    Parameters below as of 20 Dec 2023 05:04  Patient On (Oxygen Delivery Method): room air        =================PHYSICAL EXAM=================  GENERAL: NAD, Resting in bed  HEENT:  Head atraumatic, EOMI, PERRLA, conjunctiva and sclera clear; Moist mucous membranes, normal oropharynx  NECK: Supple, No JVD, no lymphadenopathy, no thyroid nodules or enlargement  CHEST/LUNG: Clear to auscultation bilaterally; No rales, rhonchi, wheezing, or rubs. Unlabored respirations on room air  HEART: Regular rate and rhythm; No murmurs, rubs, or gallops  ABDOMEN: Bowel sounds present; Soft, Nontender, Nondistended. No hepatomegally  EXTREMITIES:  2+ Peripheral Pulses, brisk capillary refill. No clubbing, cyanosis, or edema  NERVOUS SYSTEM:  Alert & Oriented X3, non-focal and spontaneous movements of all extremities  SKIN: No rashes or lesions    ===================LABS=======================                        8.1    4.90  )-----------( 86       ( 19 Dec 2023 18:22 )             25.7     Auto Eosinophil # x     / Auto Eosinophil % x     / Auto Neutrophil # x     / Auto Neutrophil % x     / BANDS % x                            8.0    5.89  )-----------( 83       ( 19 Dec 2023 08:28 )             26.2     Auto Eosinophil # x     / Auto Eosinophil % x     / Auto Neutrophil # x     / Auto Neutrophil % x     / BANDS % x                            8.2    7.69  )-----------( 111      ( 18 Dec 2023 19:43 )             26.7     Auto Eosinophil # 0.10  / Auto Eosinophil % 1.3   / Auto Neutrophil # 6.32  / Auto Neutrophil % 77.8  / BANDS % 4.4      12-19    134<L>  |  100  |  56<H>  ----------------------------<  79  5.2   |  17<L>  |  10.01<H>  12-19    133<L>  |  99  |  54<H>  ----------------------------<  88  4.9   |  17<L>  |  9.64<H>  12-18    134<L>  |  99  |  50<H>  ----------------------------<  89  5.4<H>   |  18<L>  |  9.47<H>    Ca    8.8      19 Dec 2023 08:28  TPro  7.0  /  Alb  3.9  /  TBili  2.4<H>  /  DBili  x   /  AST  20  /  ALT  13  /  AlkPhos  60  12-19  TPro  7.1  /  Alb  4.1  /  TBili  2.7<H>  /  DBili  x   /  AST  21  /  ALT  15  /  AlkPhos  66  12-18  TPro  7.0  /  Alb  3.7  /  TBili  2.4<H>  /  DBili  0.4<H>  /  AST  39  /  ALT  15  /  AlkPhos  61  12-18    PT/INR - ( 18 Dec 2023 19:43 )   PT: 13.8 sec;   INR: 1.33 ratio         PTT - ( 18 Dec 2023 19:43 )  PTT:30.3 sec      Urinalysis Basic - ( 19 Dec 2023 08:28 )    Color: x / Appearance: x / SG: x / pH: x  Gluc: 79 mg/dL / Ketone: x  / Bili: x / Urobili: x   Blood: x / Protein: x / Nitrite: x   Leuk Esterase: x / RBC: x / WBC x   Sq Epi: x / Non Sq Epi: x / Bacteria: x          ===============Radiology & Additional Tests==================  Reviewed.

## 2023-12-20 NOTE — PROGRESS NOTE ADULT - ASSESSMENT
63F with hx of noncirrhotic gastric varices, polycythemia vera and secondary myelofibrosis, ESRD on HD M/W/F, choledocholithiasis s/p biliary stent 9/2022 and removal 10/2022 s/p cholecystectomy 1/2023 presenting with diarrhea and syncope    Impression  #diarrhea, non-bloody; brown stools; ddx include infectious diarrhea vs. less likely ischemic (no hematochezia or hypotension) vs. inflammatory bowel disease vs. bile salt diarrhea (time course of vikas ~1 year ago does not fit the acute diarrhea)  - Hgb stable ~7-8 which is her baseline; vital signs stable  - CT A/P NC with normal bowel wall; no signs of colitis  - GI PCR and C. diff negative    #syncope  #ESRD on MWF HD  #s/p cholecystectomy 1/2023    Recommendations  - Please send urine legionella and stool osom  - Trend H&H; low suspicion for active bleeding at this time  - Monitor for signs of bleeding  - Transfuse as needed per primary team  - Keep on clear liquid diet tomorrow. Will reassess in AM, if persistent symptoms, may consider colonoscopy on Friday.     Note incomplete until finalized by attending signature/attestation.    Noemí Hopson  GI/Hepatology Fellow    MONDAY-FRIDAY 8AM-5PM:  Pager# [phone] ([facility]) or [phone] ([facility])    NON-URGENT CONSULTS:  Please email [email or web address] OR [email or web address]  AT NIGHT AND ON WEEKENDS:  Contact on-call GI fellow via answering service ([phone]) from 5pm-8am and on weekends/holidays   63F with hx of noncirrhotic gastric varices, polycythemia vera and secondary myelofibrosis, ESRD on HD M/W/F, choledocholithiasis s/p biliary stent 9/2022 and removal 10/2022 s/p cholecystectomy 1/2023 presenting with diarrhea and syncope    Impression  #diarrhea, non-bloody; brown stools; ddx include infectious diarrhea vs. less likely ischemic (no hematochezia or hypotension) vs. inflammatory bowel disease vs. bile salt diarrhea (time course of vikas ~1 year ago does not fit the acute diarrhea)  - Hgb stable ~7-8 which is her baseline; vital signs stable  - CT A/P NC with normal bowel wall; no signs of colitis  - GI PCR and C. diff negative    #syncope  #ESRD on MWF HD  #s/p cholecystectomy 1/2023    Recommendations  - Please send urine legionella and stool osom  - Trend H&H; low suspicion for active bleeding at this time  - Monitor for signs of bleeding  - Transfuse as needed per primary team  - Keep on clear liquid diet tomorrow. Will reassess in AM, if persistent symptoms, may consider colonoscopy on Friday.     Note incomplete until finalized by attending signature/attestation.    Noemí Hopson  GI/Hepatology Fellow    MONDAY-FRIDAY 8AM-5PM:  Pager# [phone] ([facility]) or [phone] ([facility])    NON-URGENT CONSULTS:  Please email [email or web address]  AT NIGHT AND ON WEEKENDS:  Contact on-call GI fellow via answering service ([phone]) from 5pm-8am and on weekends/holidays

## 2023-12-20 NOTE — PROGRESS NOTE ADULT - PROBLEM SELECTOR PLAN 2
Pt says she has had black diarrhea on Sunday and Monday. Since, brown diarrhea  - Hx of unspecified hepatic cirrhosis diagnosed here Oct 2022  - Hb stable ctm cbc q12h  - GI c/s, appreciate recs: likely infectious, no endoscopic intervention here  - improving Pt says she has had black diarrhea on Sunday and Monday. Since, brown diarrhea  - Hx of unspecified hepatic cirrhosis diagnosed here Oct 2022  - Hb stable ctm cbc q12h  - GI c/s, appreciate recs: likely infectious, no endoscopic intervention here  - started CTX x5 days for bacterial ppx i/s/o varices with anemia

## 2023-12-20 NOTE — PROGRESS NOTE ADULT - SUBJECTIVE AND OBJECTIVE BOX
Interval Events:   Spoke to patient via Luxembourgish : [id number]  Patient reported to have about 20 episodes of watery diarrhea yesterday, and 4 episodes this AM up to encounter. Reportedly dark in color, (+) mucus and bubbles.        Hospital Medications:  cefTRIAXone   IVPB 2000 milliGRAM(s) IV Intermittent every 24 hours  chlorhexidine 2% Cloths 1 Application(s) Topical <User Schedule>  danazol 200 milliGRAM(s) Oral three times a day  gabapentin 100 milliGRAM(s) Oral at bedtime  influenza   Vaccine 0.5 milliLiter(s) IntraMuscular once  multivitamin 1 Tablet(s) Oral daily  pantoprazole  Injectable 40 milliGRAM(s) IV Push two times a day      PHYSICAL EXAM:   Vital Signs:  Vital Signs Last 24 Hrs  T(C): 36.5 (20 Dec 2023 11:33), Max: 37.1 (19 Dec 2023 17:25)  T(F): 97.7 (20 Dec 2023 11:33), Max: 98.8 (19 Dec 2023 17:25)  HR: 84 (20 Dec 2023 11:33) (79 - 110)  BP: 125/68 (20 Dec 2023 11:33) (114/70 - 141/74)  BP(mean): --  RR: 18 (20 Dec 2023 11:33) (16 - 18)  SpO2: 98% (20 Dec 2023 11:33) (98% - 100%)    Parameters below as of 20 Dec 2023 11:33  Patient On (Oxygen Delivery Method): room air      Daily     Daily     GENERAL: no acute distress  NEURO: alert and oriented x 3  HEENT: NCAT, anicteric sclera  CHEST: no respiratory distress, no accessory muscle use  CARDIAC: regular rate, +S1/S2  ABDOMEN: soft, nontender, no rebound or guarding; rectal exam performed with RN at bedside: Light brown/yellow/green liquid stool in rectum, no hard stool or blood.   EXTREMITIES: warm, well perfused  SKIN: no active lesions noted    LABS: reviewed                        7.8    3.82  )-----------( 93       ( 20 Dec 2023 15:19 )             25.0     12-20    134<L>  |  94<L>  |  25<H>  ----------------------------<  64<L>  3.9   |  25  |  6.13<H>    Ca    8.4      20 Dec 2023 07:16  Phos  4.7     12-20  Mg     2.2     12-20    TPro  6.5  /  Alb  3.7  /  TBili  1.6<H>  /  DBili  x   /  AST  20  /  ALT  14  /  AlkPhos  57  12-20       Interval Events:   Spoke to patient via Turkish : [id number]  Patient reported to have about 20 episodes of watery diarrhea yesterday, and 4 episodes this AM up to encounter. Reportedly dark in color, (+) mucus and bubbles.        Hospital Medications:  cefTRIAXone   IVPB 2000 milliGRAM(s) IV Intermittent every 24 hours  chlorhexidine 2% Cloths 1 Application(s) Topical <User Schedule>  danazol 200 milliGRAM(s) Oral three times a day  gabapentin 100 milliGRAM(s) Oral at bedtime  influenza   Vaccine 0.5 milliLiter(s) IntraMuscular once  multivitamin 1 Tablet(s) Oral daily  pantoprazole  Injectable 40 milliGRAM(s) IV Push two times a day      PHYSICAL EXAM:   Vital Signs:  Vital Signs Last 24 Hrs  T(C): 36.5 (20 Dec 2023 11:33), Max: 37.1 (19 Dec 2023 17:25)  T(F): 97.7 (20 Dec 2023 11:33), Max: 98.8 (19 Dec 2023 17:25)  HR: 84 (20 Dec 2023 11:33) (79 - 110)  BP: 125/68 (20 Dec 2023 11:33) (114/70 - 141/74)  BP(mean): --  RR: 18 (20 Dec 2023 11:33) (16 - 18)  SpO2: 98% (20 Dec 2023 11:33) (98% - 100%)    Parameters below as of 20 Dec 2023 11:33  Patient On (Oxygen Delivery Method): room air      Daily     Daily     GENERAL: no acute distress  NEURO: alert and oriented x 3  HEENT: NCAT, anicteric sclera  CHEST: no respiratory distress, no accessory muscle use  CARDIAC: regular rate, +S1/S2  ABDOMEN: soft, nontender, no rebound or guarding; rectal exam performed with RN at bedside: Light brown/yellow/green liquid stool in rectum, no hard stool or blood.   EXTREMITIES: warm, well perfused  SKIN: no active lesions noted    LABS: reviewed                        7.8    3.82  )-----------( 93       ( 20 Dec 2023 15:19 )             25.0     12-20    134<L>  |  94<L>  |  25<H>  ----------------------------<  64<L>  3.9   |  25  |  6.13<H>    Ca    8.4      20 Dec 2023 07:16  Phos  4.7     12-20  Mg     2.2     12-20    TPro  6.5  /  Alb  3.7  /  TBili  1.6<H>  /  DBili  x   /  AST  20  /  ALT  14  /  AlkPhos  57  12-20

## 2023-12-21 DIAGNOSIS — D64.9 ANEMIA, UNSPECIFIED: ICD-10-CM

## 2023-12-21 LAB
ALBUMIN SERPL ELPH-MCNC: 3.5 G/DL — SIGNIFICANT CHANGE UP (ref 3.3–5)
ALBUMIN SERPL ELPH-MCNC: 3.5 G/DL — SIGNIFICANT CHANGE UP (ref 3.3–5)
ALP SERPL-CCNC: 68 U/L — SIGNIFICANT CHANGE UP (ref 40–120)
ALP SERPL-CCNC: 68 U/L — SIGNIFICANT CHANGE UP (ref 40–120)
ALT FLD-CCNC: 15 U/L — SIGNIFICANT CHANGE UP (ref 10–45)
ALT FLD-CCNC: 15 U/L — SIGNIFICANT CHANGE UP (ref 10–45)
ANION GAP SERPL CALC-SCNC: 12 MMOL/L — SIGNIFICANT CHANGE UP (ref 5–17)
ANION GAP SERPL CALC-SCNC: 12 MMOL/L — SIGNIFICANT CHANGE UP (ref 5–17)
AST SERPL-CCNC: 17 U/L — SIGNIFICANT CHANGE UP (ref 10–40)
AST SERPL-CCNC: 17 U/L — SIGNIFICANT CHANGE UP (ref 10–40)
BASOPHILS # BLD AUTO: 0.03 K/UL — SIGNIFICANT CHANGE UP (ref 0–0.2)
BASOPHILS # BLD AUTO: 0.03 K/UL — SIGNIFICANT CHANGE UP (ref 0–0.2)
BASOPHILS NFR BLD AUTO: 0.8 % — SIGNIFICANT CHANGE UP (ref 0–2)
BASOPHILS NFR BLD AUTO: 0.8 % — SIGNIFICANT CHANGE UP (ref 0–2)
BILIRUB SERPL-MCNC: 0.8 MG/DL — SIGNIFICANT CHANGE UP (ref 0.2–1.2)
BILIRUB SERPL-MCNC: 0.8 MG/DL — SIGNIFICANT CHANGE UP (ref 0.2–1.2)
BUN SERPL-MCNC: 30 MG/DL — HIGH (ref 7–23)
BUN SERPL-MCNC: 30 MG/DL — HIGH (ref 7–23)
CALCIUM SERPL-MCNC: 8.2 MG/DL — LOW (ref 8.4–10.5)
CALCIUM SERPL-MCNC: 8.2 MG/DL — LOW (ref 8.4–10.5)
CHLORIDE SERPL-SCNC: 100 MMOL/L — SIGNIFICANT CHANGE UP (ref 96–108)
CHLORIDE SERPL-SCNC: 100 MMOL/L — SIGNIFICANT CHANGE UP (ref 96–108)
CO2 SERPL-SCNC: 23 MMOL/L — SIGNIFICANT CHANGE UP (ref 22–31)
CO2 SERPL-SCNC: 23 MMOL/L — SIGNIFICANT CHANGE UP (ref 22–31)
CREAT SERPL-MCNC: 7.92 MG/DL — HIGH (ref 0.5–1.3)
CREAT SERPL-MCNC: 7.92 MG/DL — HIGH (ref 0.5–1.3)
CULTURE RESULTS: SIGNIFICANT CHANGE UP
CULTURE RESULTS: SIGNIFICANT CHANGE UP
EGFR: 5 ML/MIN/1.73M2 — LOW
EGFR: 5 ML/MIN/1.73M2 — LOW
EOSINOPHIL # BLD AUTO: 0.07 K/UL — SIGNIFICANT CHANGE UP (ref 0–0.5)
EOSINOPHIL # BLD AUTO: 0.07 K/UL — SIGNIFICANT CHANGE UP (ref 0–0.5)
EOSINOPHIL NFR BLD AUTO: 2 % — SIGNIFICANT CHANGE UP (ref 0–6)
EOSINOPHIL NFR BLD AUTO: 2 % — SIGNIFICANT CHANGE UP (ref 0–6)
GLUCOSE SERPL-MCNC: 148 MG/DL — HIGH (ref 70–99)
GLUCOSE SERPL-MCNC: 148 MG/DL — HIGH (ref 70–99)
HCT VFR BLD CALC: 26.3 % — LOW (ref 34.5–45)
HCT VFR BLD CALC: 26.3 % — LOW (ref 34.5–45)
HGB BLD-MCNC: 8.2 G/DL — LOW (ref 11.5–15.5)
HGB BLD-MCNC: 8.2 G/DL — LOW (ref 11.5–15.5)
IMM GRANULOCYTES NFR BLD AUTO: 6.2 % — HIGH (ref 0–0.9)
IMM GRANULOCYTES NFR BLD AUTO: 6.2 % — HIGH (ref 0–0.9)
LEGIONELLA AG UR QL: NEGATIVE — SIGNIFICANT CHANGE UP
LEGIONELLA AG UR QL: NEGATIVE — SIGNIFICANT CHANGE UP
LYMPHOCYTES # BLD AUTO: 0.42 K/UL — LOW (ref 1–3.3)
LYMPHOCYTES # BLD AUTO: 0.42 K/UL — LOW (ref 1–3.3)
LYMPHOCYTES # BLD AUTO: 11.9 % — LOW (ref 13–44)
LYMPHOCYTES # BLD AUTO: 11.9 % — LOW (ref 13–44)
MAGNESIUM SERPL-MCNC: 2.3 MG/DL — SIGNIFICANT CHANGE UP (ref 1.6–2.6)
MAGNESIUM SERPL-MCNC: 2.3 MG/DL — SIGNIFICANT CHANGE UP (ref 1.6–2.6)
MCHC RBC-ENTMCNC: 29.4 PG — SIGNIFICANT CHANGE UP (ref 27–34)
MCHC RBC-ENTMCNC: 29.4 PG — SIGNIFICANT CHANGE UP (ref 27–34)
MCHC RBC-ENTMCNC: 31.2 GM/DL — LOW (ref 32–36)
MCHC RBC-ENTMCNC: 31.2 GM/DL — LOW (ref 32–36)
MCV RBC AUTO: 94.3 FL — SIGNIFICANT CHANGE UP (ref 80–100)
MCV RBC AUTO: 94.3 FL — SIGNIFICANT CHANGE UP (ref 80–100)
MONOCYTES # BLD AUTO: 0.25 K/UL — SIGNIFICANT CHANGE UP (ref 0–0.9)
MONOCYTES # BLD AUTO: 0.25 K/UL — SIGNIFICANT CHANGE UP (ref 0–0.9)
MONOCYTES NFR BLD AUTO: 7.1 % — SIGNIFICANT CHANGE UP (ref 2–14)
MONOCYTES NFR BLD AUTO: 7.1 % — SIGNIFICANT CHANGE UP (ref 2–14)
NEUTROPHILS # BLD AUTO: 2.55 K/UL — SIGNIFICANT CHANGE UP (ref 1.8–7.4)
NEUTROPHILS # BLD AUTO: 2.55 K/UL — SIGNIFICANT CHANGE UP (ref 1.8–7.4)
NEUTROPHILS NFR BLD AUTO: 72 % — SIGNIFICANT CHANGE UP (ref 43–77)
NEUTROPHILS NFR BLD AUTO: 72 % — SIGNIFICANT CHANGE UP (ref 43–77)
NRBC # BLD: 0 /100 WBCS — SIGNIFICANT CHANGE UP (ref 0–0)
NRBC # BLD: 0 /100 WBCS — SIGNIFICANT CHANGE UP (ref 0–0)
PHOSPHATE SERPL-MCNC: 4.4 MG/DL — SIGNIFICANT CHANGE UP (ref 2.5–4.5)
PHOSPHATE SERPL-MCNC: 4.4 MG/DL — SIGNIFICANT CHANGE UP (ref 2.5–4.5)
PLATELET # BLD AUTO: 102 K/UL — LOW (ref 150–400)
PLATELET # BLD AUTO: 102 K/UL — LOW (ref 150–400)
POTASSIUM SERPL-MCNC: 4 MMOL/L — SIGNIFICANT CHANGE UP (ref 3.5–5.3)
POTASSIUM SERPL-MCNC: 4 MMOL/L — SIGNIFICANT CHANGE UP (ref 3.5–5.3)
POTASSIUM SERPL-SCNC: 4 MMOL/L — SIGNIFICANT CHANGE UP (ref 3.5–5.3)
POTASSIUM SERPL-SCNC: 4 MMOL/L — SIGNIFICANT CHANGE UP (ref 3.5–5.3)
PROT SERPL-MCNC: 6.7 G/DL — SIGNIFICANT CHANGE UP (ref 6–8.3)
PROT SERPL-MCNC: 6.7 G/DL — SIGNIFICANT CHANGE UP (ref 6–8.3)
RBC # BLD: 2.79 M/UL — LOW (ref 3.8–5.2)
RBC # BLD: 2.79 M/UL — LOW (ref 3.8–5.2)
RBC # FLD: 19.4 % — HIGH (ref 10.3–14.5)
RBC # FLD: 19.4 % — HIGH (ref 10.3–14.5)
SODIUM SERPL-SCNC: 135 MMOL/L — SIGNIFICANT CHANGE UP (ref 135–145)
SODIUM SERPL-SCNC: 135 MMOL/L — SIGNIFICANT CHANGE UP (ref 135–145)
SPECIMEN SOURCE: SIGNIFICANT CHANGE UP
SPECIMEN SOURCE: SIGNIFICANT CHANGE UP
WBC # BLD: 3.54 K/UL — LOW (ref 3.8–10.5)
WBC # BLD: 3.54 K/UL — LOW (ref 3.8–10.5)
WBC # FLD AUTO: 3.54 K/UL — LOW (ref 3.8–10.5)
WBC # FLD AUTO: 3.54 K/UL — LOW (ref 3.8–10.5)

## 2023-12-21 PROCEDURE — 99232 SBSQ HOSP IP/OBS MODERATE 35: CPT

## 2023-12-21 PROCEDURE — 99232 SBSQ HOSP IP/OBS MODERATE 35: CPT | Mod: GC

## 2023-12-21 RX ORDER — LOPERAMIDE HCL 2 MG
2 TABLET ORAL EVERY 6 HOURS
Refills: 0 | Status: DISCONTINUED | OUTPATIENT
Start: 2023-12-21 | End: 2023-12-22

## 2023-12-21 RX ORDER — ERYTHROPOIETIN 10000 [IU]/ML
20000 INJECTION, SOLUTION INTRAVENOUS; SUBCUTANEOUS
Refills: 0 | Status: DISCONTINUED | OUTPATIENT
Start: 2023-12-21 | End: 2023-12-21

## 2023-12-21 RX ORDER — ERYTHROPOIETIN 10000 [IU]/ML
20000 INJECTION, SOLUTION INTRAVENOUS; SUBCUTANEOUS
Refills: 0 | Status: DISCONTINUED | OUTPATIENT
Start: 2023-12-21 | End: 2023-12-22

## 2023-12-21 RX ADMIN — PANTOPRAZOLE SODIUM 40 MILLIGRAM(S): 20 TABLET, DELAYED RELEASE ORAL at 17:52

## 2023-12-21 RX ADMIN — DANAZOL 200 MILLIGRAM(S): 200 CAPSULE ORAL at 13:38

## 2023-12-21 RX ADMIN — CHLORHEXIDINE GLUCONATE 1 APPLICATION(S): 213 SOLUTION TOPICAL at 05:55

## 2023-12-21 RX ADMIN — Medication 1 TABLET(S): at 11:30

## 2023-12-21 RX ADMIN — DANAZOL 200 MILLIGRAM(S): 200 CAPSULE ORAL at 05:54

## 2023-12-21 RX ADMIN — PANTOPRAZOLE SODIUM 40 MILLIGRAM(S): 20 TABLET, DELAYED RELEASE ORAL at 05:54

## 2023-12-21 RX ADMIN — DANAZOL 200 MILLIGRAM(S): 200 CAPSULE ORAL at 22:17

## 2023-12-21 RX ADMIN — GABAPENTIN 100 MILLIGRAM(S): 400 CAPSULE ORAL at 22:16

## 2023-12-21 RX ADMIN — CEFTRIAXONE 100 MILLIGRAM(S): 500 INJECTION, POWDER, FOR SOLUTION INTRAMUSCULAR; INTRAVENOUS at 14:55

## 2023-12-21 NOTE — PROGRESS NOTE ADULT - SUBJECTIVE AND OBJECTIVE BOX
Interval Events:   No acute events overnight.   Spoke to patient via Malay  [id number].  Patient reports diarrhea has been slowing down. Previously multiple episodes daily including 2 episodes at night that woke her from sleep. Last night she had 1 episode around 11 PM, and slept until 5:30 AM with another episode. Non-bloody.   Patient denied fever, chills, nausea, vomiting, abdominal pain. Tolerating PO.       Hospital Medications:  cefTRIAXone   IVPB 2000 milliGRAM(s) IV Intermittent every 24 hours  chlorhexidine 2% Cloths 1 Application(s) Topical <User Schedule>  danazol 200 milliGRAM(s) Oral three times a day  gabapentin 100 milliGRAM(s) Oral at bedtime  influenza   Vaccine 0.5 milliLiter(s) IntraMuscular once  loperamide 2 milliGRAM(s) Oral every 6 hours PRN  multivitamin 1 Tablet(s) Oral daily  pantoprazole  Injectable 40 milliGRAM(s) IV Push two times a day      PHYSICAL EXAM:   Vital Signs:  Vital Signs Last 24 Hrs  T(C): 36.6 (21 Dec 2023 04:29), Max: 36.7 (20 Dec 2023 21:49)  T(F): 97.9 (21 Dec 2023 04:29), Max: 98 (20 Dec 2023 21:49)  HR: 85 (21 Dec 2023 04:29) (84 - 86)  BP: 133/76 (21 Dec 2023 04:29) (125/68 - 133/76)  BP(mean): --  RR: 18 (21 Dec 2023 04:29) (18 - 18)  SpO2: 96% (21 Dec 2023 04:29) (96% - 98%)    Parameters below as of 21 Dec 2023 04:29  Patient On (Oxygen Delivery Method): room air      Daily     Daily     GENERAL: no acute distress  NEURO: alert and oriented x 3  HEENT: NCAT, no conjunctival pallor appreciated; anicteric sclera  CHEST: no respiratory distress, no accessory muscle use  CARDIAC: regular rate, +S1/S2  ABDOMEN: soft, mild tenderness at LUQ, no rebound or guarding  EXTREMITIES: warm, well perfused  SKIN: no active lesions noted    LABS: reviewed                        8.2    3.54  )-----------( 102      ( 21 Dec 2023 05:50 )             26.3     12-21    135  |  100  |  30<H>  ----------------------------<  148<H>  4.0   |  23  |  7.92<H>    Ca    8.2<L>      21 Dec 2023 05:50  Phos  4.4     12-21  Mg     2.3     12-21    TPro  6.7  /  Alb  3.5  /  TBili  0.8  /  DBili  x   /  AST  17  /  ALT  15  /  AlkPhos  68  12-21       Interval Events:   No acute events overnight.   Spoke to patient via Italian  [id number].  Patient reports diarrhea has been slowing down. Previously multiple episodes daily including 2 episodes at night that woke her from sleep. Last night she had 1 episode around 11 PM, and slept until 5:30 AM with another episode. Non-bloody.   Patient denied fever, chills, nausea, vomiting, abdominal pain. Tolerating PO.       Hospital Medications:  cefTRIAXone   IVPB 2000 milliGRAM(s) IV Intermittent every 24 hours  chlorhexidine 2% Cloths 1 Application(s) Topical <User Schedule>  danazol 200 milliGRAM(s) Oral three times a day  gabapentin 100 milliGRAM(s) Oral at bedtime  influenza   Vaccine 0.5 milliLiter(s) IntraMuscular once  loperamide 2 milliGRAM(s) Oral every 6 hours PRN  multivitamin 1 Tablet(s) Oral daily  pantoprazole  Injectable 40 milliGRAM(s) IV Push two times a day      PHYSICAL EXAM:   Vital Signs:  Vital Signs Last 24 Hrs  T(C): 36.6 (21 Dec 2023 04:29), Max: 36.7 (20 Dec 2023 21:49)  T(F): 97.9 (21 Dec 2023 04:29), Max: 98 (20 Dec 2023 21:49)  HR: 85 (21 Dec 2023 04:29) (84 - 86)  BP: 133/76 (21 Dec 2023 04:29) (125/68 - 133/76)  BP(mean): --  RR: 18 (21 Dec 2023 04:29) (18 - 18)  SpO2: 96% (21 Dec 2023 04:29) (96% - 98%)    Parameters below as of 21 Dec 2023 04:29  Patient On (Oxygen Delivery Method): room air      Daily     Daily     GENERAL: no acute distress  NEURO: alert and oriented x 3  HEENT: NCAT, no conjunctival pallor appreciated; anicteric sclera  CHEST: no respiratory distress, no accessory muscle use  CARDIAC: regular rate, +S1/S2  ABDOMEN: soft, mild tenderness at LUQ, no rebound or guarding  EXTREMITIES: warm, well perfused  SKIN: no active lesions noted    LABS: reviewed                        8.2    3.54  )-----------( 102      ( 21 Dec 2023 05:50 )             26.3     12-21    135  |  100  |  30<H>  ----------------------------<  148<H>  4.0   |  23  |  7.92<H>    Ca    8.2<L>      21 Dec 2023 05:50  Phos  4.4     12-21  Mg     2.3     12-21    TPro  6.7  /  Alb  3.5  /  TBili  0.8  /  DBili  x   /  AST  17  /  ALT  15  /  AlkPhos  68  12-21

## 2023-12-21 NOTE — PROGRESS NOTE ADULT - SUBJECTIVE AND OBJECTIVE BOX
Patient is a 63y old  Female who presents with a chief complaint of weakness , dizziness , syncope and diarrhea (20 Dec 2023 17:22)      SUBJECTIVE :      MEDICATIONS  (STANDING):  cefTRIAXone   IVPB 2000 milliGRAM(s) IV Intermittent every 24 hours  chlorhexidine 2% Cloths 1 Application(s) Topical <User Schedule>  danazol 200 milliGRAM(s) Oral three times a day  gabapentin 100 milliGRAM(s) Oral at bedtime  influenza   Vaccine 0.5 milliLiter(s) IntraMuscular once  multivitamin 1 Tablet(s) Oral daily  pantoprazole  Injectable 40 milliGRAM(s) IV Push two times a day    MEDICATIONS  (PRN):      CAPILLARY BLOOD GLUCOSE        I&O's Summary    19 Dec 2023 07:01  -  20 Dec 2023 07:00  --------------------------------------------------------  IN: 0 mL / OUT: 0 mL / NET: 0 mL    20 Dec 2023 07:01  -  21 Dec 2023 06:54  --------------------------------------------------------  IN: 530 mL / OUT: 0 mL / NET: 530 mL        PHYSICAL EXAM:  Vital Signs Last 24 Hrs  T(C): 36.6 (21 Dec 2023 04:29), Max: 36.7 (20 Dec 2023 21:49)  T(F): 97.9 (21 Dec 2023 04:29), Max: 98 (20 Dec 2023 21:49)  HR: 85 (21 Dec 2023 04:29) (84 - 86)  BP: 133/76 (21 Dec 2023 04:29) (125/68 - 133/76)  BP(mean): --  RR: 18 (21 Dec 2023 04:29) (18 - 18)  SpO2: 96% (21 Dec 2023 04:29) (96% - 98%)    Parameters below as of 21 Dec 2023 04:29  Patient On (Oxygen Delivery Method): room air    GENERAL: NAD, Resting in bed  HEENT:  Head atraumatic, EOMI, PERRLA, conjunctiva and sclera clear; Moist mucous membranes, normal oropharynx  NECK: Supple, No JVD, no lymphadenopathy, no thyroid nodules or enlargement  CHEST/LUNG: Clear to auscultation bilaterally; No rales, rhonchi, wheezing, or rubs. Unlabored respirations on room air  HEART: Regular rate and rhythm; No murmurs, rubs, or gallops  ABDOMEN: Bowel sounds present; Soft, Nontender, Nondistended. No hepatomegally  EXTREMITIES:  2+ Peripheral Pulses, brisk capillary refill. No clubbing, cyanosis, or edema  NERVOUS SYSTEM:  Alert & Oriented X3, non-focal and spontaneous movements of all extremities  SKIN: No rashes or lesions    LABS:                        8.2    3.54  )-----------( 102      ( 21 Dec 2023 05:50 )             26.3     12-21    135  |  100  |  30<H>  ----------------------------<  148<H>  4.0   |  23  |  7.92<H>    Ca    8.2<L>      21 Dec 2023 05:50  Phos  4.4     12-21  Mg     2.3     12-21    TPro  6.7  /  Alb  3.5  /  TBili  0.8  /  DBili  x   /  AST  17  /  ALT  15  /  AlkPhos  68  12-21          Urinalysis Basic - ( 21 Dec 2023 05:50 )    Color: x / Appearance: x / SG: x / pH: x  Gluc: 148 mg/dL / Ketone: x  / Bili: x / Urobili: x   Blood: x / Protein: x / Nitrite: x   Leuk Esterase: x / RBC: x / WBC x   Sq Epi: x / Non Sq Epi: x / Bacteria: x        Culture - Stool (collected 19 Dec 2023 14:29)  Source: .Stool Feces  Preliminary Report (20 Dec 2023 15:42):    No enteric pathogens to date: Final culture pending    No enteric gram negative rods isolated    Culture - Urine (collected 18 Dec 2023 21:23)  Source: Clean Catch Clean Catch (Midstream)  Final Report (20 Dec 2023 00:28):    <10,000 CFU/mL Normal Urogenital Caroline    Culture - Blood (collected 18 Dec 2023 19:55)  Source: .Blood Blood-Peripheral  Preliminary Report (21 Dec 2023 01:03):    No growth at 48 Hours    Culture - Blood (collected 18 Dec 2023 19:40)  Source: .Blood Blood-Peripheral  Preliminary Report (21 Dec 2023 01:02):    No growth at 48 Hours        RADIOLOGY & ADDITIONAL TESTS:  Results Reviewed:   Imaging Personally Reviewed:  Electrocardiogram Personally Reviewed:    COORDINATION OF CARE:  Care Discussed with Consultants/Other Providers [Y/N]:  Prior or Outpatient Records Reviewed [Y/N]:   Patient is a 63y old  Female who presents with a chief complaint of weakness , dizziness , syncope and diarrhea (20 Dec 2023 17:22)      SUBJECTIVE : NAEO. Pt seen and examined at bedside. Diarrhea improved, no melena noted yest. Denies CP, SOB, fever/chills, nausea/vomiting, diarrhea/constipation.       MEDICATIONS  (STANDING):  cefTRIAXone   IVPB 2000 milliGRAM(s) IV Intermittent every 24 hours  chlorhexidine 2% Cloths 1 Application(s) Topical <User Schedule>  danazol 200 milliGRAM(s) Oral three times a day  gabapentin 100 milliGRAM(s) Oral at bedtime  influenza   Vaccine 0.5 milliLiter(s) IntraMuscular once  multivitamin 1 Tablet(s) Oral daily  pantoprazole  Injectable 40 milliGRAM(s) IV Push two times a day    MEDICATIONS  (PRN):      CAPILLARY BLOOD GLUCOSE        I&O's Summary    19 Dec 2023 07:01  -  20 Dec 2023 07:00  --------------------------------------------------------  IN: 0 mL / OUT: 0 mL / NET: 0 mL    20 Dec 2023 07:01  -  21 Dec 2023 06:54  --------------------------------------------------------  IN: 530 mL / OUT: 0 mL / NET: 530 mL        PHYSICAL EXAM:  Vital Signs Last 24 Hrs  T(C): 36.6 (21 Dec 2023 04:29), Max: 36.7 (20 Dec 2023 21:49)  T(F): 97.9 (21 Dec 2023 04:29), Max: 98 (20 Dec 2023 21:49)  HR: 85 (21 Dec 2023 04:29) (84 - 86)  BP: 133/76 (21 Dec 2023 04:29) (125/68 - 133/76)  BP(mean): --  RR: 18 (21 Dec 2023 04:29) (18 - 18)  SpO2: 96% (21 Dec 2023 04:29) (96% - 98%)    Parameters below as of 21 Dec 2023 04:29  Patient On (Oxygen Delivery Method): room air    GENERAL: NAD, Resting in bed  HEENT:  Head atraumatic, EOMI, PERRLA, conjunctiva and sclera clear; Moist mucous membranes, normal oropharynx  NECK: Supple, No JVD, no lymphadenopathy, no thyroid nodules or enlargement  CHEST/LUNG: Clear to auscultation bilaterally; No rales, rhonchi, wheezing, or rubs. Unlabored respirations on room air  HEART: Regular rate and rhythm; No murmurs, rubs, or gallops  ABDOMEN: Bowel sounds present; Soft, Nontender, Nondistended. No hepatomegaly  EXTREMITIES:  2+ Peripheral Pulses, brisk capillary refill. No clubbing, cyanosis, or edema  NERVOUS SYSTEM:  Alert & Oriented X3, non-focal and spontaneous movements of all extremities  SKIN: No rashes or lesions    LABS:                        8.2    3.54  )-----------( 102      ( 21 Dec 2023 05:50 )             26.3     12-21    135  |  100  |  30<H>  ----------------------------<  148<H>  4.0   |  23  |  7.92<H>    Ca    8.2<L>      21 Dec 2023 05:50  Phos  4.4     12-21  Mg     2.3     12-21    TPro  6.7  /  Alb  3.5  /  TBili  0.8  /  DBili  x   /  AST  17  /  ALT  15  /  AlkPhos  68  12-21          Urinalysis Basic - ( 21 Dec 2023 05:50 )    Color: x / Appearance: x / SG: x / pH: x  Gluc: 148 mg/dL / Ketone: x  / Bili: x / Urobili: x   Blood: x / Protein: x / Nitrite: x   Leuk Esterase: x / RBC: x / WBC x   Sq Epi: x / Non Sq Epi: x / Bacteria: x        Culture - Stool (collected 19 Dec 2023 14:29)  Source: .Stool Feces  Preliminary Report (20 Dec 2023 15:42):    No enteric pathogens to date: Final culture pending    No enteric gram negative rods isolated    Culture - Urine (collected 18 Dec 2023 21:23)  Source: Clean Catch Clean Catch (Midstream)  Final Report (20 Dec 2023 00:28):    <10,000 CFU/mL Normal Urogenital Caroline    Culture - Blood (collected 18 Dec 2023 19:55)  Source: .Blood Blood-Peripheral  Preliminary Report (21 Dec 2023 01:03):    No growth at 48 Hours    Culture - Blood (collected 18 Dec 2023 19:40)  Source: .Blood Blood-Peripheral  Preliminary Report (21 Dec 2023 01:02):    No growth at 48 Hours        RADIOLOGY & ADDITIONAL TESTS:  Results Reviewed:   Imaging Personally Reviewed:  Electrocardiogram Personally Reviewed:    COORDINATION OF CARE:  Care Discussed with Consultants/Other Providers [Y/N]:  Prior or Outpatient Records Reviewed [Y/N]:   Patient is a 63y old  Female who presents with a chief complaint of weakness , dizziness , syncope and diarrhea (20 Dec 2023 17:22)      SUBJECTIVE : NAEO. Pt seen and examined at bedside. Diarrhea improved, no melena noted yest. Denies CP, SOB, fever/chills, nausea/vomiting, diarrhea/constipation.       MEDICATIONS  (STANDING):  cefTRIAXone   IVPB 2000 milliGRAM(s) IV Intermittent every 24 hours  chlorhexidine 2% Cloths 1 Application(s) Topical <User Schedule>  danazol 200 milliGRAM(s) Oral three times a day  gabapentin 100 milliGRAM(s) Oral at bedtime  influenza   Vaccine 0.5 milliLiter(s) IntraMuscular once  multivitamin 1 Tablet(s) Oral daily  pantoprazole  Injectable 40 milliGRAM(s) IV Push two times a day    MEDICATIONS  (PRN):      CAPILLARY BLOOD GLUCOSE        I&O's Summary    19 Dec 2023 07:01  -  20 Dec 2023 07:00  --------------------------------------------------------  IN: 0 mL / OUT: 0 mL / NET: 0 mL    20 Dec 2023 07:01  -  21 Dec 2023 06:54  --------------------------------------------------------  IN: 530 mL / OUT: 0 mL / NET: 530 mL        PHYSICAL EXAM:  Vital Signs Last 24 Hrs  T(C): 36.6 (21 Dec 2023 04:29), Max: 36.7 (20 Dec 2023 21:49)  T(F): 97.9 (21 Dec 2023 04:29), Max: 98 (20 Dec 2023 21:49)  HR: 85 (21 Dec 2023 04:29) (84 - 86)  BP: 133/76 (21 Dec 2023 04:29) (125/68 - 133/76)  BP(mean): --  RR: 18 (21 Dec 2023 04:29) (18 - 18)  SpO2: 96% (21 Dec 2023 04:29) (96% - 98%)    Parameters below as of 21 Dec 2023 04:29  Patient On (Oxygen Delivery Method): room air    GENERAL: NAD, Resting in bed  HEENT:  Head atraumatic, EOMI, PERRLA, conjunctiva and sclera clear; Moist mucous membranes, normal oropharynx  NECK: Supple, No JVD, no lymphadenopathy, no thyroid nodules or enlargement  CHEST/LUNG: Clear to auscultation bilaterally; No rales, rhonchi, wheezing, or rubs. Unlabored respirations on room air  HEART: Regular rate and rhythm; No murmurs, rubs, or gallops  ABDOMEN: Bowel sounds present; Soft, Nontender, Nondistended. No hepatomegaly  EXTREMITIES:  2+ Peripheral Pulses, brisk capillary refill. No clubbing, cyanosis, or edema  NERVOUS SYSTEM:  Alert & Oriented X3, non-focal and spontaneous movements of all extremities      LABS:                        8.2    3.54  )-----------( 102      ( 21 Dec 2023 05:50 )             26.3     12-21    135  |  100  |  30<H>  ----------------------------<  148<H>  4.0   |  23  |  7.92<H>    Ca    8.2<L>      21 Dec 2023 05:50  Phos  4.4     12-21  Mg     2.3     12-21    TPro  6.7  /  Alb  3.5  /  TBili  0.8  /  DBili  x   /  AST  17  /  ALT  15  /  AlkPhos  68  12-21          Urinalysis Basic - ( 21 Dec 2023 05:50 )    Color: x / Appearance: x / SG: x / pH: x  Gluc: 148 mg/dL / Ketone: x  / Bili: x / Urobili: x   Blood: x / Protein: x / Nitrite: x   Leuk Esterase: x / RBC: x / WBC x   Sq Epi: x / Non Sq Epi: x / Bacteria: x        Culture - Stool (collected 19 Dec 2023 14:29)  Source: .Stool Feces  Preliminary Report (20 Dec 2023 15:42):    No enteric pathogens to date: Final culture pending    No enteric gram negative rods isolated    Culture - Urine (collected 18 Dec 2023 21:23)  Source: Clean Catch Clean Catch (Midstream)  Final Report (20 Dec 2023 00:28):    <10,000 CFU/mL Normal Urogenital Caroline    Culture - Blood (collected 18 Dec 2023 19:55)  Source: .Blood Blood-Peripheral  Preliminary Report (21 Dec 2023 01:03):    No growth at 48 Hours    Culture - Blood (collected 18 Dec 2023 19:40)  Source: .Blood Blood-Peripheral  Preliminary Report (21 Dec 2023 01:02):    No growth at 48 Hours        RADIOLOGY & ADDITIONAL TESTS:  Results Reviewed:   Imaging Personally Reviewed:  Electrocardiogram Personally Reviewed:    COORDINATION OF CARE:  Care Discussed with Consultants/Other Providers [Y/N]:  Prior or Outpatient Records Reviewed [Y/N]:

## 2023-12-21 NOTE — PROGRESS NOTE ADULT - PROBLEM SELECTOR PLAN 6
Diet: regular  VTE ppx: non i/s/o ?bleeding  Dispo: medically active Diet: regular  VTE ppx: SCD, non i/s/o ?bleeding  Dispo: medically active with 2/2 myelofibrosis    - ctm cbc

## 2023-12-21 NOTE — PROGRESS NOTE ADULT - TIME BILLING
personally evaluating patient , reviewing and ordering labs, discussing with Nephro and implementing care and discussing with patient
personally evaluating patient , reviewing and ordering labs, discussing with Nephro and implementing care and discussing with patient

## 2023-12-21 NOTE — PROGRESS NOTE ADULT - ASSESSMENT
Patient is a 63-year-old female with a history of hypertension, polycythemia vera and secondary myelofibrosis, splenomegaly, ESRD on HD M/W/F, last HD on Friday here for evaluation of weakness, dizziness and missed HD. Patient is a 63-year-old female with a history of hypertension, polycythemia vera and secondary myelofibrosis, splenomegaly, ESRD on HD M/W/F here for evaluation of weakness, dizziness and missed HD. Patient is a 63-year-old female with a history of hypertension, polycythemia vera and secondary myelofibrosis, splenomegaly, ESRD on HD M/W/F here for evaluation of weakness, dizziness and missed HD.  Patient was found to have worsening anemia

## 2023-12-21 NOTE — PROGRESS NOTE ADULT - ASSESSMENT
63F with hx of noncirrhotic gastric varices, polycythemia vera and secondary myelofibrosis, ESRD on HD M/W/F, choledocholithiasis s/p biliary stent 9/2022 and removal 10/2022 s/p cholecystectomy 1/2023 presenting with diarrhea and syncope    Impression  #diarrhea, non-bloody; brown stools; ddx include infectious diarrhea vs. less likely ischemic (no hematochezia or hypotension) vs. inflammatory bowel disease vs. bile salt diarrhea (time course of vikas ~1 year ago does not fit the acute diarrhea)  - Hgb stable ~7-8 which is her baseline; vital signs stable  - CT A/P NC with normal bowel wall; no signs of colitis  - GI PCR and C. diff negative    #syncope  #ESRD on MWF HD  #s/p cholecystectomy 1/2023    Recommendations  - Please send urine legionella and stool osom  - Loperamide Q6h prn  - Trend H&H; low suspicion for active bleeding at this time  - Monitor for signs of bleeding  - Transfuse as needed per primary team  - Keep on clear liquid diet today for tentative EGD/colonoscopy tomorrow    Note incomplete until finalized by attending signature/attestation.    Noemí Hopson  GI/Hepatology Fellow    MONDAY-FRIDAY 8AM-5PM:  Pager# [phone] ([facility]) or [phone] ([facility])    NON-URGENT CONSULTS:  Please email [email or web address]  AT NIGHT AND ON WEEKENDS:  Contact on-call GI fellow via answering service ([phone]) from 5pm-8am and on weekends/holidays   63F with hx of noncirrhotic gastric varices, polycythemia vera and secondary myelofibrosis, ESRD on HD M/W/F, choledocholithiasis s/p biliary stent 9/2022 and removal 10/2022 s/p cholecystectomy 1/2023 presenting with diarrhea and syncope    Impression  #diarrhea, non-bloody; brown stools; ddx include infectious diarrhea vs. less likely ischemic (no hematochezia or hypotension) vs. inflammatory bowel disease vs. bile salt diarrhea (time course of vikas ~1 year ago does not fit the acute diarrhea)  - Hgb stable ~7-8 which is her baseline; vital signs stable  - CT A/P NC with normal bowel wall; no signs of colitis  - GI PCR and C. diff negative    #syncope  #ESRD on MWF HD  #s/p cholecystectomy 1/2023    Recommendations  - Please send urine legionella and stool osom  - Loperamide Q6h prn  - Trend H&H; low suspicion for active bleeding at this time  - Monitor for signs of bleeding  - Transfuse as needed per primary team  - Keep on clear liquid diet today for tentative EGD/colonoscopy tomorrow    Note incomplete until finalized by attending signature/attestation.    Noemí Hopson  GI/Hepatology Fellow    MONDAY-FRIDAY 8AM-5PM:  Pager# [phone] ([facility]) or [phone] ([facility])    NON-URGENT CONSULTS:  Please email [email or web address]  AT NIGHT AND ON WEEKENDS:  Contact on-call GI fellow via answering service ([phone]) from 5pm-8am and on weekends/holidays   63F with hx of noncirrhotic gastric varices, polycythemia vera and secondary myelofibrosis, ESRD on HD M/W/F, choledocholithiasis s/p biliary stent 9/2022 and removal 10/2022 s/p cholecystectomy 1/2023 presenting with diarrhea and syncope    Impression  #diarrhea, non-bloody; brown stools; ddx include infectious diarrhea vs. less likely ischemic (no hematochezia or hypotension) vs. inflammatory bowel disease vs. bile salt diarrhea (time course of vikas ~1 year ago does not fit the acute diarrhea)  - Hgb stable ~7-8 which is her baseline; vital signs stable  - CT A/P NC with normal bowel wall; no signs of colitis  - GI PCR and C. diff negative    #syncope  #ESRD on MWF HD  #s/p cholecystectomy 1/2023    Recommendations  - Please send urine legionella and stool osom  - Loperamide Q6h prn  - Trend H&H; low suspicion for active bleeding at this time  - Monitor for signs of bleeding; If (+) bleeding, may consider EGD on Friday  - Transfuse as needed per primary team  - Keep on clear liquid diet today  - If diarrhea improves, no role of colonoscopy at this time.    Note incomplete until finalized by attending signature/attestation.    Noemí Hopson  GI/Hepatology Fellow    MONDAY-FRIDAY 8AM-5PM:  Pager# [phone] ([facility]) or [phone] ([facility])    NON-URGENT CONSULTS:  Please email [email or web address]  AT NIGHT AND ON WEEKENDS:  Contact on-call GI fellow via answering service ([phone]) from 5pm-8am and on weekends/holidays   63F with hx of noncirrhotic gastric varices, polycythemia vera and secondary myelofibrosis, ESRD on HD M/W/F, choledocholithiasis s/p biliary stent 9/2022 and removal 10/2022 s/p cholecystectomy 1/2023 presenting with diarrhea and syncope    Impression  #diarrhea, non-bloody; brown stools; ddx include infectious diarrhea vs. less likely ischemic (no hematochezia or hypotension) vs. inflammatory bowel disease vs. bile salt diarrhea (time course of vikas ~1 year ago does not fit the acute diarrhea)  - Hgb stable ~7-8 which is her baseline; vital signs stable  - CT A/P NC with normal bowel wall; no signs of colitis  - GI PCR and C. diff negative    #syncope  #ESRD on MWF HD  #s/p cholecystectomy 1/2023    Recommendations  - Please send urine legionella and stool osom  - Loperamide Q6h prn  - Trend H&H; low suspicion for active bleeding at this time  - Monitor for signs of bleeding; If (+) bleeding, may consider EGD on Friday  - Transfuse as needed per primary team  - Keep on clear liquid diet today  - If diarrhea improves, no role of colonoscopy at this time.    Note incomplete until finalized by attending signature/attestation.    Noemí Hopson  GI/Hepatology Fellow    MONDAY-FRIDAY 8AM-5PM:  Pager# [phone] ([facility]) or [phone] ([facility])    NON-URGENT CONSULTS:  Please email [email or web address]  AT NIGHT AND ON WEEKENDS:  Contact on-call GI fellow via answering service ([phone]) from 5pm-8am and on weekends/holidays

## 2023-12-21 NOTE — PROGRESS NOTE ADULT - PROBLEM SELECTOR PLAN 3
on HD M/W/F, last HD on Friday   Nephrologist Dr De León  - nephberenice c/s for HD here Pt says she has had black diarrhea on Sunday and Monday. Since, brown diarrhea  - Hx of unspecified hepatic cirrhosis diagnosed here Oct 2022  - Hb stable ctm cbc q12h  - GI c/s, appreciate recs: likely infectious, possible endoscopy, colonoscopy   - started CTX x5 days for bacterial ppx i/s/o varices with anemia

## 2023-12-21 NOTE — PROGRESS NOTE ADULT - PROBLEM SELECTOR PLAN 5
with 2/2 myelofibrosis  Follows with with 2/2 myelofibrosis    - ctm cbc hold home meds i/s/o normal BP

## 2023-12-21 NOTE — PROGRESS NOTE ADULT - PROBLEM SELECTOR PLAN 4
hold home meds i/s/o normal BP on HD M/W/F, last HD on Friday   Nephrologist Dr De León  - nephberenice c/s for HD here

## 2023-12-21 NOTE — PROGRESS NOTE ADULT - PROBLEM SELECTOR PLAN 2
Pt says she has had black diarrhea on Sunday and Monday. Since, brown diarrhea  - Hx of unspecified hepatic cirrhosis diagnosed here Oct 2022  - Hb stable ctm cbc q12h  - GI c/s, appreciate recs: likely infectious, no endoscopic intervention here  - started CTX x5 days for bacterial ppx i/s/o varices with anemia Pt says she has had black diarrhea on Sunday and Monday. Since, brown diarrhea  - Hx of unspecified hepatic cirrhosis diagnosed here Oct 2022  - Hb stable ctm cbc q12h  - GI c/s, appreciate recs: likely infectious, possible endoscopy, colonoscopy   - started CTX x5 days for bacterial ppx i/s/o varices with anemia concern for acute blood loss anemia   S/P one unit of PRBC with good response   CW IV Protonix, ceftriaxone   GI plans on EGD/colonoscopy   monitor H/H

## 2023-12-22 ENCOUNTER — TRANSCRIPTION ENCOUNTER (OUTPATIENT)
Age: 63
End: 2023-12-22

## 2023-12-22 VITALS
OXYGEN SATURATION: 98 % | HEART RATE: 97 BPM | RESPIRATION RATE: 18 BRPM | WEIGHT: 130.51 LBS | SYSTOLIC BLOOD PRESSURE: 128 MMHG | DIASTOLIC BLOOD PRESSURE: 70 MMHG | TEMPERATURE: 99 F

## 2023-12-22 LAB
ANION GAP SERPL CALC-SCNC: 15 MMOL/L — SIGNIFICANT CHANGE UP (ref 5–17)
ANION GAP SERPL CALC-SCNC: 15 MMOL/L — SIGNIFICANT CHANGE UP (ref 5–17)
BLD GP AB SCN SERPL QL: NEGATIVE — SIGNIFICANT CHANGE UP
BLD GP AB SCN SERPL QL: NEGATIVE — SIGNIFICANT CHANGE UP
BUN SERPL-MCNC: 35 MG/DL — HIGH (ref 7–23)
BUN SERPL-MCNC: 35 MG/DL — HIGH (ref 7–23)
CALCIUM SERPL-MCNC: 8.8 MG/DL — SIGNIFICANT CHANGE UP (ref 8.4–10.5)
CALCIUM SERPL-MCNC: 8.8 MG/DL — SIGNIFICANT CHANGE UP (ref 8.4–10.5)
CHLORIDE SERPL-SCNC: 103 MMOL/L — SIGNIFICANT CHANGE UP (ref 96–108)
CHLORIDE SERPL-SCNC: 103 MMOL/L — SIGNIFICANT CHANGE UP (ref 96–108)
CO2 SERPL-SCNC: 20 MMOL/L — LOW (ref 22–31)
CO2 SERPL-SCNC: 20 MMOL/L — LOW (ref 22–31)
CREAT SERPL-MCNC: 9.53 MG/DL — HIGH (ref 0.5–1.3)
CREAT SERPL-MCNC: 9.53 MG/DL — HIGH (ref 0.5–1.3)
EGFR: 4 ML/MIN/1.73M2 — LOW
EGFR: 4 ML/MIN/1.73M2 — LOW
GLUCOSE SERPL-MCNC: 74 MG/DL — SIGNIFICANT CHANGE UP (ref 70–99)
GLUCOSE SERPL-MCNC: 74 MG/DL — SIGNIFICANT CHANGE UP (ref 70–99)
HCT VFR BLD CALC: 27 % — LOW (ref 34.5–45)
HCT VFR BLD CALC: 27 % — LOW (ref 34.5–45)
HGB BLD-MCNC: 8.2 G/DL — LOW (ref 11.5–15.5)
HGB BLD-MCNC: 8.2 G/DL — LOW (ref 11.5–15.5)
MAGNESIUM SERPL-MCNC: 2.3 MG/DL — SIGNIFICANT CHANGE UP (ref 1.6–2.6)
MAGNESIUM SERPL-MCNC: 2.3 MG/DL — SIGNIFICANT CHANGE UP (ref 1.6–2.6)
MCHC RBC-ENTMCNC: 29.1 PG — SIGNIFICANT CHANGE UP (ref 27–34)
MCHC RBC-ENTMCNC: 29.1 PG — SIGNIFICANT CHANGE UP (ref 27–34)
MCHC RBC-ENTMCNC: 30.4 GM/DL — LOW (ref 32–36)
MCHC RBC-ENTMCNC: 30.4 GM/DL — LOW (ref 32–36)
MCV RBC AUTO: 95.7 FL — SIGNIFICANT CHANGE UP (ref 80–100)
MCV RBC AUTO: 95.7 FL — SIGNIFICANT CHANGE UP (ref 80–100)
NRBC # BLD: 1 /100 WBCS — HIGH (ref 0–0)
NRBC # BLD: 1 /100 WBCS — HIGH (ref 0–0)
PHOSPHATE SERPL-MCNC: 4.8 MG/DL — HIGH (ref 2.5–4.5)
PHOSPHATE SERPL-MCNC: 4.8 MG/DL — HIGH (ref 2.5–4.5)
PLATELET # BLD AUTO: 103 K/UL — LOW (ref 150–400)
PLATELET # BLD AUTO: 103 K/UL — LOW (ref 150–400)
POTASSIUM SERPL-MCNC: 4.5 MMOL/L — SIGNIFICANT CHANGE UP (ref 3.5–5.3)
POTASSIUM SERPL-MCNC: 4.5 MMOL/L — SIGNIFICANT CHANGE UP (ref 3.5–5.3)
POTASSIUM SERPL-SCNC: 4.5 MMOL/L — SIGNIFICANT CHANGE UP (ref 3.5–5.3)
POTASSIUM SERPL-SCNC: 4.5 MMOL/L — SIGNIFICANT CHANGE UP (ref 3.5–5.3)
RBC # BLD: 2.82 M/UL — LOW (ref 3.8–5.2)
RBC # BLD: 2.82 M/UL — LOW (ref 3.8–5.2)
RBC # FLD: 19.4 % — HIGH (ref 10.3–14.5)
RBC # FLD: 19.4 % — HIGH (ref 10.3–14.5)
RH IG SCN BLD-IMP: POSITIVE — SIGNIFICANT CHANGE UP
RH IG SCN BLD-IMP: POSITIVE — SIGNIFICANT CHANGE UP
SODIUM SERPL-SCNC: 138 MMOL/L — SIGNIFICANT CHANGE UP (ref 135–145)
SODIUM SERPL-SCNC: 138 MMOL/L — SIGNIFICANT CHANGE UP (ref 135–145)
WBC # BLD: 4.62 K/UL — SIGNIFICANT CHANGE UP (ref 3.8–10.5)
WBC # BLD: 4.62 K/UL — SIGNIFICANT CHANGE UP (ref 3.8–10.5)
WBC # FLD AUTO: 4.62 K/UL — SIGNIFICANT CHANGE UP (ref 3.8–10.5)
WBC # FLD AUTO: 4.62 K/UL — SIGNIFICANT CHANGE UP (ref 3.8–10.5)

## 2023-12-22 PROCEDURE — 83735 ASSAY OF MAGNESIUM: CPT

## 2023-12-22 PROCEDURE — 36430 TRANSFUSION BLD/BLD COMPNT: CPT

## 2023-12-22 PROCEDURE — 87040 BLOOD CULTURE FOR BACTERIA: CPT

## 2023-12-22 PROCEDURE — 84295 ASSAY OF SERUM SODIUM: CPT

## 2023-12-22 PROCEDURE — 83036 HEMOGLOBIN GLYCOSYLATED A1C: CPT

## 2023-12-22 PROCEDURE — 82962 GLUCOSE BLOOD TEST: CPT

## 2023-12-22 PROCEDURE — 82248 BILIRUBIN DIRECT: CPT

## 2023-12-22 PROCEDURE — P9040: CPT

## 2023-12-22 PROCEDURE — 81001 URINALYSIS AUTO W/SCOPE: CPT

## 2023-12-22 PROCEDURE — 87086 URINE CULTURE/COLONY COUNT: CPT

## 2023-12-22 PROCEDURE — 82310 ASSAY OF CALCIUM: CPT

## 2023-12-22 PROCEDURE — 99261: CPT

## 2023-12-22 PROCEDURE — 84145 PROCALCITONIN (PCT): CPT

## 2023-12-22 PROCEDURE — 96374 THER/PROPH/DIAG INJ IV PUSH: CPT

## 2023-12-22 PROCEDURE — 85610 PROTHROMBIN TIME: CPT

## 2023-12-22 PROCEDURE — 82803 BLOOD GASES ANY COMBINATION: CPT

## 2023-12-22 PROCEDURE — 86922 COMPATIBILITY TEST ANTIGLOB: CPT

## 2023-12-22 PROCEDURE — 87045 FECES CULTURE AEROBIC BACT: CPT

## 2023-12-22 PROCEDURE — 99232 SBSQ HOSP IP/OBS MODERATE 35: CPT

## 2023-12-22 PROCEDURE — 86902 BLOOD TYPE ANTIGEN DONOR EA: CPT

## 2023-12-22 PROCEDURE — 82607 VITAMIN B-12: CPT

## 2023-12-22 PROCEDURE — 86901 BLOOD TYPING SEROLOGIC RH(D): CPT

## 2023-12-22 PROCEDURE — 83605 ASSAY OF LACTIC ACID: CPT

## 2023-12-22 PROCEDURE — 96375 TX/PRO/DX INJ NEW DRUG ADDON: CPT

## 2023-12-22 PROCEDURE — 87449 NOS EACH ORGANISM AG IA: CPT

## 2023-12-22 PROCEDURE — 83690 ASSAY OF LIPASE: CPT

## 2023-12-22 PROCEDURE — 84132 ASSAY OF SERUM POTASSIUM: CPT

## 2023-12-22 PROCEDURE — 84999 UNLISTED CHEMISTRY PROCEDURE: CPT

## 2023-12-22 PROCEDURE — 87637 SARSCOV2&INF A&B&RSV AMP PRB: CPT

## 2023-12-22 PROCEDURE — 87641 MR-STAPH DNA AMP PROBE: CPT

## 2023-12-22 PROCEDURE — 99232 SBSQ HOSP IP/OBS MODERATE 35: CPT | Mod: GC

## 2023-12-22 PROCEDURE — 83550 IRON BINDING TEST: CPT

## 2023-12-22 PROCEDURE — 94640 AIRWAY INHALATION TREATMENT: CPT

## 2023-12-22 PROCEDURE — 85027 COMPLETE CBC AUTOMATED: CPT

## 2023-12-22 PROCEDURE — 85730 THROMBOPLASTIN TIME PARTIAL: CPT

## 2023-12-22 PROCEDURE — 86850 RBC ANTIBODY SCREEN: CPT

## 2023-12-22 PROCEDURE — 82746 ASSAY OF FOLIC ACID SERUM: CPT

## 2023-12-22 PROCEDURE — 83615 LACTATE (LD) (LDH) ENZYME: CPT

## 2023-12-22 PROCEDURE — 87507 IADNA-DNA/RNA PROBE TQ 12-25: CPT

## 2023-12-22 PROCEDURE — 99285 EMERGENCY DEPT VISIT HI MDM: CPT

## 2023-12-22 PROCEDURE — 82330 ASSAY OF CALCIUM: CPT

## 2023-12-22 PROCEDURE — 83970 ASSAY OF PARATHORMONE: CPT

## 2023-12-22 PROCEDURE — 83540 ASSAY OF IRON: CPT

## 2023-12-22 PROCEDURE — 84100 ASSAY OF PHOSPHORUS: CPT

## 2023-12-22 PROCEDURE — 85014 HEMATOCRIT: CPT

## 2023-12-22 PROCEDURE — 85018 HEMOGLOBIN: CPT

## 2023-12-22 PROCEDURE — 99239 HOSP IP/OBS DSCHRG MGMT >30: CPT | Mod: GC

## 2023-12-22 PROCEDURE — 86900 BLOOD TYPING SEROLOGIC ABO: CPT

## 2023-12-22 PROCEDURE — 36415 COLL VENOUS BLD VENIPUNCTURE: CPT

## 2023-12-22 PROCEDURE — 80053 COMPREHEN METABOLIC PANEL: CPT

## 2023-12-22 PROCEDURE — 87324 CLOSTRIDIUM AG IA: CPT

## 2023-12-22 PROCEDURE — 76700 US EXAM ABDOM COMPLETE: CPT

## 2023-12-22 PROCEDURE — 82728 ASSAY OF FERRITIN: CPT

## 2023-12-22 PROCEDURE — 82272 OCCULT BLD FECES 1-3 TESTS: CPT

## 2023-12-22 PROCEDURE — 80048 BASIC METABOLIC PNL TOTAL CA: CPT

## 2023-12-22 PROCEDURE — 87046 STOOL CULTR AEROBIC BACT EA: CPT

## 2023-12-22 PROCEDURE — 82435 ASSAY OF BLOOD CHLORIDE: CPT

## 2023-12-22 PROCEDURE — 87640 STAPH A DNA AMP PROBE: CPT

## 2023-12-22 PROCEDURE — 82247 BILIRUBIN TOTAL: CPT

## 2023-12-22 PROCEDURE — 71045 X-RAY EXAM CHEST 1 VIEW: CPT

## 2023-12-22 PROCEDURE — 85025 COMPLETE CBC W/AUTO DIFF WBC: CPT

## 2023-12-22 PROCEDURE — 82947 ASSAY GLUCOSE BLOOD QUANT: CPT

## 2023-12-22 RX ORDER — HEPARIN SODIUM 5000 [USP'U]/ML
5000 INJECTION INTRAVENOUS; SUBCUTANEOUS EVERY 12 HOURS
Refills: 0 | Status: DISCONTINUED | OUTPATIENT
Start: 2023-12-22 | End: 2023-12-22

## 2023-12-22 RX ADMIN — Medication 300 UNIT(S): at 18:18

## 2023-12-22 RX ADMIN — DANAZOL 200 MILLIGRAM(S): 200 CAPSULE ORAL at 13:56

## 2023-12-22 RX ADMIN — Medication 1 TABLET(S): at 13:56

## 2023-12-22 RX ADMIN — ERYTHROPOIETIN 20000 UNIT(S): 10000 INJECTION, SOLUTION INTRAVENOUS; SUBCUTANEOUS at 10:23

## 2023-12-22 RX ADMIN — DANAZOL 200 MILLIGRAM(S): 200 CAPSULE ORAL at 05:53

## 2023-12-22 RX ADMIN — PANTOPRAZOLE SODIUM 40 MILLIGRAM(S): 20 TABLET, DELAYED RELEASE ORAL at 05:54

## 2023-12-22 RX ADMIN — CHLORHEXIDINE GLUCONATE 1 APPLICATION(S): 213 SOLUTION TOPICAL at 06:04

## 2023-12-22 NOTE — PROGRESS NOTE ADULT - ATTENDING COMMENTS
Diarrhea with significant improvement  Suspect this is post infectious  Doubt drop in hgb was GIB related  Will trend CBC this evening and tomm, if drop in hgb then EGD tomm  Otherwise to continue with supportive care
negative infectious workup  Still profuse diarrhea at this time  Drop in hgb to 6.9 and response to xfusion  May need endoscopic evaluation  Start clear liquid diet tonight  Okay with Imodium  Rest of tests as above
stable hgb  diarrhea down to 4-5 per day  Feels well, tolerating PO at this time  Still with brown stools    Suspect this was infectious  Doubt GIB    Supportive care  Okay with PRN Imodium  No further GI workup  D/c when tolerating PO and adequate liquid  GI to s/o, call with ?
ESRD on HD  Ongoing diarrhea, some improvement  No edema  For dialysis today per prescription  Remainder per fellow, will follow
63 year old female with h/o myelofibrosis, polycythemia vera, ESRD on MWF via LUE AVF, Portal HTN (c/b Isolated Gastric Varices), Gastric varices, gallstones S/P cholecystectomy , ERCP 9/22/2022 for sludge and stones s/p covered metal biliary stent 10 mm x 4 cm c/b post ERCP pancreatitis  who presents to the hospital with 2 days of watery diarrhea which started as black, brown, and now green associated with fatigue, dizziness and syncope of 2 sec as per patient.  In he ED, patient remains hemodynamically stable, ZOsyn , IV tylenol and treatment for hyperkalemia wih resolution of hyperkalemia.  Patient is admitted for further evaluation.      # Syncope / fatigue , dizziness and melenic stools in pt with known portal HTN with varices       DC  protonix and DC ceftriaxone        S/P one unit of PRBC with stable H/H      Syncope is likely from possible symptomatic anemia      NO plans for  EGD/Colonoscopy as per GI team  # Diarrhea     resolved   # ESHR on HD         HD as per Nephro     DC home and patient will f/up with Hem, PCP, Nephro and GI   DC time 45mns     rest as per above   Discuss with the medical resident    Ann Fuentes   HOspitalist   available on TEAMS
63 year old female with h/o myelofibrosis, polycythemia vera, ESRD on MWF via LUE AVF, Portal HTN (c/b Isolated Gastric Varices), Gastric varices, gallstones S/P cholecystectomy , ERCP 9/22/2022 for sludge and stones s/p covered metal biliary stent 10 mm x 4 cm c/b post ERCP pancreatitis  who presents to the hospital with 2 days of watery diarrhea which started as black, brown, and now green associated with fatigue, dizziness and syncope of 2 sec as per patient.  In he ED, patient remains hemodynamically stable, ZOsyn , IV tylenol and treatment for hyperkalemia wih resolution of hyperkalemia.  Patient is admitted for further evaluation.      # Syncope / fatigue , dizziness and melenic stools in pt with known portal HTN with varices      CBC every 12 hours , protonix        S/P one unit of PRBC with stable H/H      Syncope is likely from possible symptomatic anemia       Possible EGD/Colonoscopy as per GI on 12/22        CW Ceftriaxone in case patient it is hyopharyngeal varices bleed   # Diarrhea      could be from GIB ( cathartic ), Vs food poisoning or ABX       Neg GI PCR,  C diff toxin       Pend stool CX   # ESHR on HD         HD as per Nephro      is to bring patient's Meds for MDS     rest as per above   Discuss with the medical resident  Ann Fuentes   HOspitalist   available on TEAMS
63 year old female with h/o myelofibrosis, polycythemia vera, ESRD on MWF via LUE AVF, Portal HTN (c/b Isolated Gastric Varices), Gastric varices, gallstones S/P cholecystectomy , ERCP 9/22/2022 for sludge and stones s/p covered metal biliary stent 10 mm x 4 cm c/b post ERCP pancreatitis  who presents to the hospital with 2 days of watery diarrhea which started as black, brown, and now green.  Patient was in usual health with the diarrhea, fatigue, dizziness and syncope of 2 sec as per patient.  NO known sick contact and no fever at home , no hematemesis and no abd pain. In he ED, patient remains hemodynamically stable, ZOsyn , IV tylenol and treatment for hyperkalemia wih resolution of hyperkalemia.  Patient is admitted for further evaluation.    POs recent ED visit for abdominal and Chest pain an cough ---> noted with drop in H/H and one episode of melena  # Syncope / fatigue , dizziness and melenic stools in pt with known portal HTN with varices     closely monitor patient and hemodynamics, Orthostatic vitals , CBC every 12 hours , protonix      Transfuse one unit of PRBC and f/up post transfusion CBC      Syncope is likely from possible symptomatic anemia , but will keep in Tele for one day to r/o any arrythmia      F/up on further GI rec       will start Ceftriaxone in case patient it is hyopharyngeal varices bleed   # Diarrhea      could be from GIB ( cathartic ), Vs food poisoning or ABX       Neg GI PCR,  C diff toxin       Pend stool CX   # ESHR on HD      nephro eval is appreciated   # Elevated Procal and fatigue and Syncope     --> high Procal could be due to ESRD, but would F/UP blood CX for Bacteremia in pt with Anterior chest PORT in place     f/up blood CX     rest ia as per above   Discuss with the medical resident  Ann Fuentes   HOspitalist   available on TEAMS

## 2023-12-22 NOTE — PROGRESS NOTE ADULT - REASON FOR ADMISSION
weakness , dizziness , syncope and diarrhea

## 2023-12-22 NOTE — PROGRESS NOTE ADULT - PROBLEM SELECTOR PLAN 1
Fatigue that has been going on for several days. Can be 2/2 missed HD, vs anemia, vs infection vs electrolyte abnormalities  - ctm BMP  - f/u bcx, ucx  - cxr negative  - afebrile and no wbc here  - HD per nephro
ESRD MWF, missed HD on 12/18. Last outpatient HD on 12/15. Stated she typically gets ~2L UF. Her dry weight is 57.5kg, but her standing weight on admission is 58.5kg and that is despite having missed HD. Patient also hypotensive. She was likely hypovolemic, which contributed to syncopal episode. K 6.0 (hemolyzed but she was shifted in ED), repeat K 5.4. Had HD (12/19) with 0UF. Now vitals stable and diarrhea resolved. Plan for scope tomorrow with GI 2/2 reported melena. Will plan for HD today per her normal schedule with 1L UF and then on Sunday (Christmas scheduling) and back to her normal schedule.    Anemia: Pt has hx of polycythemia and myelofibrosis, on Jakafi and EPO, hgb at baseline ~8.0. Continue EPO 20,000u with HD. Sees heme outpatient.    CKD-MBD: Phos, Ca, PTH at goal.      Deedee Tse DO  Nephrology Fellow  Feel free to contact me directly on TEAMS with any questions.  (After 5pm or on weekends, please call the on-call fellow).
Fatigue that has been going on for several days. Can be 2/2 missed HD, vs anemia, vs infection vs electrolyte abnormalities  - ctm BMP  - Neg  bcx, ucx  - cxr negative  - afebrile and no wbc here  - HD per nephro
Fatigue that has been going on for several days. Can be 2/2 missed HD, vs anemia, vs infection vs electrolyte abnormalities  - ctm BMP  - f/u bcx, ucx  - cxr negative  - afebrile and no wbc here  - HD per nephro

## 2023-12-22 NOTE — DISCHARGE NOTE NURSING/CASE MANAGEMENT/SOCIAL WORK - NSDCPEFALRISK_GEN_ALL_CORE
For information on Fall & Injury Prevention, visit: [email or web address] OR  [email or web address] OR  https://www.cdc.gov/steadi/patient.html For information on Fall & Injury Prevention, visit: [email or web address] OR  [email or web address] OR  https://www.cdc.gov/steadi/patient.html

## 2023-12-22 NOTE — DISCHARGE NOTE PROVIDER - NSDCFUSCHEDAPPT_GEN_ALL_CORE_FT
[facility]  [facility]  Scheduled Appointment: 12/28/2023    Jacky Guo  [facility]  [facility]  Scheduled Appointment: 12/28/2023    Gabriel Frederick  [facility]  ENDOVASCULAR [address]   Scheduled Appointment: 02/01/2024     [facility]  [facility]  Scheduled Appointment: 12/28/2023    Jacky Guo  [facility]  [facility]  Scheduled Appointment: 12/28/2023    Gabriel Frederick  [facility]  ENDOVASCULAR [address]   Scheduled Appointment: 02/01/2024     [facility]  [facility]  Scheduled Appointment: 12/28/2023    Jacky Guo  [facility]  [facility]  Scheduled Appointment: 12/28/2023    Gabriel Frederick  [facility]  ENDOVASCULAR [address]   Scheduled Appointment: 02/01/2024    [facility]  [facility]  [facility]   Scheduled Appointment: 03/25/2024     [facility]  [facility]  Scheduled Appointment: 12/28/2023    Jacky Guo  [facility]  [facility]  Scheduled Appointment: 12/28/2023    Gabriel Frederick  [facility]  ENDOVASCULAR [address]   Scheduled Appointment: 02/01/2024    [facility]  [facility]  [facility]   Scheduled Appointment: 03/25/2024

## 2023-12-22 NOTE — DISCHARGE NOTE PROVIDER - CARE PROVIDER_API CALL
ITZEL ABREU  Phone: [phone]  Fax: ()-  Established Patient  Follow Up Time: 2 weeks   ITZEL ABREU  Phone: [phone]  Fax: ()-  Established Patient  Follow Up Time: 2 weeks

## 2023-12-22 NOTE — PROGRESS NOTE ADULT - SUBJECTIVE AND OBJECTIVE BOX
INTERNAL MEDICINE PROGRESS NOTE  Angel Roberts MD  Please contact via TEAMS    Patient is a 63y old  Female who presents with a chief complaint of weakness , dizziness , syncope and diarrhea (21 Dec 2023 08:24)      SUBJECTIVE / OVERNIGHT EVENTS::  No acute overnight events. Pt seen and examined. Denies fevers, chills, CP, SOB, Abdominal pain, N/V, Constipation      =================Meds=================  MEDICATIONS  (STANDING):  cefTRIAXone   IVPB 2000 milliGRAM(s) IV Intermittent every 24 hours  chlorhexidine 2% Cloths 1 Application(s) Topical <User Schedule>  danazol 200 milliGRAM(s) Oral three times a day  epoetin filiberto (EPOGEN) Injectable 88073 Unit(s) IV Push <User Schedule>  gabapentin 100 milliGRAM(s) Oral at bedtime  influenza   Vaccine 0.5 milliLiter(s) IntraMuscular once  multivitamin 1 Tablet(s) Oral daily  pantoprazole  Injectable 40 milliGRAM(s) IV Push two times a day    MEDICATIONS  (PRN):  loperamide 2 milliGRAM(s) Oral every 6 hours PRN Diarrhea      I&O's Summary    21 Dec 2023 07:01  -  22 Dec 2023 07:00  --------------------------------------------------------  IN: 1000 mL / OUT: 0 mL / NET: 1000 mL        =================Vitals=================  Vital Signs Last 24 Hrs  T(C): 36.7 (22 Dec 2023 04:35), Max: 36.9 (21 Dec 2023 11:25)  T(F): 98.1 (22 Dec 2023 04:35), Max: 98.5 (21 Dec 2023 21:50)  HR: 81 (22 Dec 2023 04:35) (81 - 86)  BP: 149/78 (22 Dec 2023 04:35) (137/77 - 149/78)  BP(mean): --  RR: 18 (22 Dec 2023 04:35) (18 - 18)  SpO2: 95% (22 Dec 2023 04:35) (95% - 98%)    Parameters below as of 22 Dec 2023 04:35  Patient On (Oxygen Delivery Method): room air        =================PHYSICAL EXAM=================  GENERAL: NAD, Resting in bed  HEENT:  Head atraumatic, EOMI, PERRLA, conjunctiva and sclera clear; Moist mucous membranes, normal oropharynx  NECK: Supple, No JVD, no lymphadenopathy, no thyroid nodules or enlargement  CHEST/LUNG: Clear to auscultation bilaterally; No rales, rhonchi, wheezing, or rubs. Unlabored respirations on room air  HEART: Regular rate and rhythm; No murmurs, rubs, or gallops  ABDOMEN: Bowel sounds present; Soft, Nontender, Nondistended. No hepatomegaly  EXTREMITIES:  2+ Peripheral Pulses, brisk capillary refill. No clubbing, cyanosis, or edema  NERVOUS SYSTEM:  Alert & Oriented X3, non-focal and spontaneous movements of all extremities    ===================LABS=======================                        8.2    4.62  )-----------( 103      ( 22 Dec 2023 05:57 )             27.0     Auto Eosinophil # x     / Auto Eosinophil % x     / Auto Neutrophil # x     / Auto Neutrophil % x     / BANDS % x                            8.2    3.54  )-----------( 102      ( 21 Dec 2023 05:50 )             26.3     Auto Eosinophil # 0.07  / Auto Eosinophil % 2.0   / Auto Neutrophil # 2.55  / Auto Neutrophil % 72.0  / BANDS % x                            7.8    3.82  )-----------( 93       ( 20 Dec 2023 15:19 )             25.0     Auto Eosinophil # x     / Auto Eosinophil % x     / Auto Neutrophil # x     / Auto Neutrophil % x     / BANDS % x        12-22    138  |  103  |  35<H>  ----------------------------<  74  4.5   |  20<L>  |  9.53<H>  12-21    135  |  100  |  30<H>  ----------------------------<  148<H>  4.0   |  23  |  7.92<H>    Ca    8.8      22 Dec 2023 05:56  Mg     2.3     12-22  Phos  4.8     12-22  TPro  6.7  /  Alb  3.5  /  TBili  0.8  /  DBili  x   /  AST  17  /  ALT  15  /  AlkPhos  68  12-21          Urinalysis Basic - ( 22 Dec 2023 05:56 )    Color: x / Appearance: x / SG: x / pH: x  Gluc: 74 mg/dL / Ketone: x  / Bili: x / Urobili: x   Blood: x / Protein: x / Nitrite: x   Leuk Esterase: x / RBC: x / WBC x   Sq Epi: x / Non Sq Epi: x / Bacteria: x          ===============Radiology & Additional Tests==================  Reviewed.       INTERNAL MEDICINE PROGRESS NOTE  Angel Roberts MD  Please contact via TEAMS    Patient is a 63y old  Female who presents with a chief complaint of weakness , dizziness , syncope and diarrhea (21 Dec 2023 08:24)      SUBJECTIVE / OVERNIGHT EVENTS::  No acute overnight events. Pt seen and examined. Denies fevers, chills, CP, SOB, Abdominal pain, N/V, Constipation      =================Meds=================  MEDICATIONS  (STANDING):  cefTRIAXone   IVPB 2000 milliGRAM(s) IV Intermittent every 24 hours  chlorhexidine 2% Cloths 1 Application(s) Topical <User Schedule>  danazol 200 milliGRAM(s) Oral three times a day  epoetin filiberto (EPOGEN) Injectable 52918 Unit(s) IV Push <User Schedule>  gabapentin 100 milliGRAM(s) Oral at bedtime  influenza   Vaccine 0.5 milliLiter(s) IntraMuscular once  multivitamin 1 Tablet(s) Oral daily  pantoprazole  Injectable 40 milliGRAM(s) IV Push two times a day    MEDICATIONS  (PRN):  loperamide 2 milliGRAM(s) Oral every 6 hours PRN Diarrhea      I&O's Summary    21 Dec 2023 07:01  -  22 Dec 2023 07:00  --------------------------------------------------------  IN: 1000 mL / OUT: 0 mL / NET: 1000 mL        =================Vitals=================  Vital Signs Last 24 Hrs  T(C): 36.7 (22 Dec 2023 04:35), Max: 36.9 (21 Dec 2023 11:25)  T(F): 98.1 (22 Dec 2023 04:35), Max: 98.5 (21 Dec 2023 21:50)  HR: 81 (22 Dec 2023 04:35) (81 - 86)  BP: 149/78 (22 Dec 2023 04:35) (137/77 - 149/78)  BP(mean): --  RR: 18 (22 Dec 2023 04:35) (18 - 18)  SpO2: 95% (22 Dec 2023 04:35) (95% - 98%)    Parameters below as of 22 Dec 2023 04:35  Patient On (Oxygen Delivery Method): room air        =================PHYSICAL EXAM=================  GENERAL: NAD, Resting in bed  HEENT:  Head atraumatic, EOMI, PERRLA, conjunctiva and sclera clear; Moist mucous membranes, normal oropharynx  NECK: Supple, No JVD, no lymphadenopathy, no thyroid nodules or enlargement  CHEST/LUNG: Clear to auscultation bilaterally; No rales, rhonchi, wheezing, or rubs. Unlabored respirations on room air  HEART: Regular rate and rhythm; No murmurs, rubs, or gallops  ABDOMEN: Bowel sounds present; Soft, Nontender, Nondistended. No hepatomegaly  EXTREMITIES:  2+ Peripheral Pulses, brisk capillary refill. No clubbing, cyanosis, or edema  NERVOUS SYSTEM:  Alert & Oriented X3, non-focal and spontaneous movements of all extremities    ===================LABS=======================                        8.2    4.62  )-----------( 103      ( 22 Dec 2023 05:57 )             27.0     Auto Eosinophil # x     / Auto Eosinophil % x     / Auto Neutrophil # x     / Auto Neutrophil % x     / BANDS % x                            8.2    3.54  )-----------( 102      ( 21 Dec 2023 05:50 )             26.3     Auto Eosinophil # 0.07  / Auto Eosinophil % 2.0   / Auto Neutrophil # 2.55  / Auto Neutrophil % 72.0  / BANDS % x                            7.8    3.82  )-----------( 93       ( 20 Dec 2023 15:19 )             25.0     Auto Eosinophil # x     / Auto Eosinophil % x     / Auto Neutrophil # x     / Auto Neutrophil % x     / BANDS % x        12-22    138  |  103  |  35<H>  ----------------------------<  74  4.5   |  20<L>  |  9.53<H>  12-21    135  |  100  |  30<H>  ----------------------------<  148<H>  4.0   |  23  |  7.92<H>    Ca    8.8      22 Dec 2023 05:56  Mg     2.3     12-22  Phos  4.8     12-22  TPro  6.7  /  Alb  3.5  /  TBili  0.8  /  DBili  x   /  AST  17  /  ALT  15  /  AlkPhos  68  12-21          Urinalysis Basic - ( 22 Dec 2023 05:56 )    Color: x / Appearance: x / SG: x / pH: x  Gluc: 74 mg/dL / Ketone: x  / Bili: x / Urobili: x   Blood: x / Protein: x / Nitrite: x   Leuk Esterase: x / RBC: x / WBC x   Sq Epi: x / Non Sq Epi: x / Bacteria: x          ===============Radiology & Additional Tests==================  Reviewed.

## 2023-12-22 NOTE — PROGRESS NOTE ADULT - PROVIDER SPECIALTY LIST ADULT
Gastroenterology
Gastroenterology
Internal Medicine
Internal Medicine
Nephrology-Cardiorenal
Gastroenterology
Internal Medicine

## 2023-12-22 NOTE — DISCHARGE NOTE PROVIDER - PROVIDER TOKENS
PROVIDER:[TOKEN:[[id number]],FOLLOWUP:[2 weeks],ESTABLISHEDPATIENT:[T]] PROVIDER:[TOKEN:[[id number]],FOLLOWUP:[2 weeks],ESTABLISHEDPATIENT:[T]]

## 2023-12-22 NOTE — DISCHARGE NOTE PROVIDER - NSDCMRMEDTOKEN_GEN_ALL_CORE_FT
danazol 200 mg oral capsule: 1 cap(s) orally 3 times a day  epoetin filiberto: 33439 unit(s) intravenous Monday, Wednesday, and Friday with dialysis as per nephrology  gabapentin 100 mg oral capsule: 1 cap(s) orally once a day (at bedtime)  Jakafi 20 mg oral tablet: 1 tab(s) orally 3 times a week After dialysis  Nephro-Ben oral tablet: 1 tab(s) orally once a day  Ojjaara 200 mg oral tablet: 1 tab(s) orally once a day   danazol 200 mg oral capsule: 1 cap(s) orally 3 times a day  epoetin filiberto: 56925 unit(s) intravenous Monday, Wednesday, and Friday with dialysis as per nephrology  gabapentin 100 mg oral capsule: 1 cap(s) orally once a day (at bedtime)  Jakafi 20 mg oral tablet: 1 tab(s) orally 3 times a week After dialysis  Nephro-Ben oral tablet: 1 tab(s) orally once a day  Ojjaara 200 mg oral tablet: 1 tab(s) orally once a day

## 2023-12-22 NOTE — DISCHARGE NOTE PROVIDER - NSDCFUADDAPPT_GEN_ALL_CORE_FT
APPTS ARE READY TO BE MADE: [x] YES    Best Family or Patient Contact (if needed):    Additional Information about above appointments (if needed):    1: [facility] - [phone] - 1 week f/u  2:   3:     Other comments or requests:    APPTS ARE READY TO BE MADE: [x] YES    Best Family or Patient Contact (if needed):    Additional Information about above appointments (if needed):    1: [facility] - [phone] - 1 week f/u  2:   3:     Other comments or requests:    APPTS ARE READY TO BE MADE: [x] YES    Best Family or Patient Contact (if needed):    Additional Information about above appointments (if needed):    1: [facility] - [phone] - 1 week f/u  2: [facility] -  - 2 week f/u  3:     Other comments or requests:    APPTS ARE READY TO BE MADE: [x] YES    Best Family or Patient Contact (if needed):    Additional Information about above appointments (if needed):    1: [facility] - [phone] - 1 week f/u  2: [facility]a Barros -  - 2 week f/u  3:     Other comments or requests:    APPTS ARE READY TO BE MADE: [x] YES    Best Family or Patient Contact (if needed):    Additional Information about above appointments (if needed):    1: [facility] - [phone] - 1 week f/u  2: GI clinic - 2 week f/u  3:     Other comments or requests:    APPTS ARE READY TO BE MADE: [x] YES    Best Family or Patient Contact (if needed):    Additional Information about above appointments (if needed):    1: [facility] - [phone] - 1 week f/u  2: GI clinic - 2 week f/u  3:     Other comments or requests:    APPTS ARE READY TO BE MADE: [x] YES    Best Family or Patient Contact (if needed):    Additional Information about above appointments (if needed):    1: [facility] - [phone] - 1 week f/u  2: GI clinic - 2 week f/u  3:     Other comments or requests:   Patient was provided with follow up request details and was advised to call to schedule follow up within specified time frame. No scheduling assistance is needed at this time.   APPTS ARE READY TO BE MADE: [x] YES    Best Family or Patient Contact (if needed):    Additional Information about above appointments (if needed):    1: [facility] - [phone] - 1 week f/u  2: GI clinic - 2 week f/u  3:     Other comments or requests:   Patient was provided with follow up request details and was advised to call to schedule follow up within specified time frame. No scheduling assistance is needed at this time.

## 2023-12-22 NOTE — PROGRESS NOTE ADULT - PROBLEM SELECTOR PLAN 2
concern for acute blood loss anemia   S/P one unit of PRBC with good response and Hem has been stable   DC IV Protonix, ceftriaxone   GI team has no plans on EGD/colonoscopy in patient and patient agrees   outpt follow up with GI team   No rectal bleed and no melena as per patient

## 2023-12-22 NOTE — PROGRESS NOTE ADULT - ASSESSMENT
Patient is a 63-year-old female with a history of hypertension, polycythemia vera and secondary myelofibrosis, splenomegaly, ESRD on HD M/W/F here for evaluation of weakness, dizziness and missed HD.  Patient was found to have worsening anemia which has been stable post transfusion.

## 2023-12-22 NOTE — PROGRESS NOTE ADULT - ASSESSMENT
63F with hx of noncirrhotic gastric varices, polycythemia vera and secondary myelofibrosis, ESRD on HD M/W/F, choledocholithiasis s/p biliary stent 9/2022 and removal 10/2022 s/p cholecystectomy 1/2023 presenting with diarrhea and syncope    Impression  #diarrhea, non-bloody; brown stools; ddx include infectious diarrhea vs. less likely ischemic (no hematochezia or hypotension) vs. inflammatory bowel disease vs. bile salt diarrhea (time course of vikas ~1 year ago does not fit the acute diarrhea)  - Hgb stable ~7-8 which is her baseline; vital signs stable  - CT A/P NC with normal bowel wall; no signs of colitis  - GI PCR and C. diff negative    #syncope  #ESRD on MWF HD  #s/p cholecystectomy 1/2023    Recommendations  - Loperamide Q6h prn  - Trend H&H; low suspicion for active bleeding at this time  - Discussed with patient, no indication for colonoscopy at this time. Given stable hgb and no active GIB, EGD is not urgent as well. Patient preferred to hold off elective EGD at this time.   - Transfuse as needed per primary team  - Advance diet as tolerated.   - Patient may f/u with GI clinic outpatient for management of gastric varices.     Note incomplete until finalized by attending signature/attestation.    Noemí Hospon  GI/Hepatology Fellow    MONDAY-FRIDAY 8AM-5PM:  Pager# [phone] ([facility]) or [phone] ([facility])    NON-URGENT CONSULTS:  Please email [email or web address] OR [email or web address]  AT NIGHT AND ON WEEKENDS:  Contact on-call GI fellow via answering service ([phone]) from 5pm-8am and on weekends/holidays   63F with hx of noncirrhotic gastric varices, polycythemia vera and secondary myelofibrosis, ESRD on HD M/W/F, choledocholithiasis s/p biliary stent 9/2022 and removal 10/2022 s/p cholecystectomy 1/2023 presenting with diarrhea and syncope    Impression  #diarrhea, non-bloody; brown stools; ddx include infectious diarrhea vs. less likely ischemic (no hematochezia or hypotension) vs. inflammatory bowel disease vs. bile salt diarrhea (time course of vikas ~1 year ago does not fit the acute diarrhea)  - Hgb stable ~7-8 which is her baseline; vital signs stable  - CT A/P NC with normal bowel wall; no signs of colitis  - GI PCR and C. diff negative    #syncope  #ESRD on MWF HD  #s/p cholecystectomy 1/2023    Recommendations  - Loperamide Q6h prn  - Trend H&H; low suspicion for active bleeding at this time  - Discussed with patient, no indication for colonoscopy at this time. Given stable hgb and no active GIB, EGD is not urgent as well. Patient preferred to hold off elective EGD at this time.   - Transfuse as needed per primary team  - Advance diet as tolerated.   - Patient may f/u with GI clinic outpatient for management of gastric varices.     Note incomplete until finalized by attending signature/attestation.    Noemí Hopson  GI/Hepatology Fellow    MONDAY-FRIDAY 8AM-5PM:  Pager# [phone] ([facility]) or [phone] ([facility])    NON-URGENT CONSULTS:  Please email [email or web address] OR [email or web address]  AT NIGHT AND ON WEEKENDS:  Contact on-call GI fellow via answering service ([phone]) from 5pm-8am and on weekends/holidays

## 2023-12-22 NOTE — PROGRESS NOTE ADULT - SUBJECTIVE AND OBJECTIVE BOX
[facility] DIVISION OF KIDNEY DISEASES AND HYPERTENSION   FOLLOW UP NOTE    --------------------------------------------------------------------------------    SUBJECTIVE / ROS / INTERVAL EVENTS:  - Patient seen and examined at bedside  - states diarrhea has improved, planned for colonoscopy tomorrow  - vitals stable, HD today      PAST HISTORY  --------------------------------------------------------------------------------  No significant changes to PMH, PSH, FHx, SHx, unless otherwise noted    ALLERGIES & MEDICATIONS  --------------------------------------------------------------------------------  Allergies    No Known Allergies    Intolerances      Standing Inpatient Medications  cefTRIAXone   IVPB 2000 milliGRAM(s) IV Intermittent every 24 hours  chlorhexidine 2% Cloths 1 Application(s) Topical <User Schedule>  danazol 200 milliGRAM(s) Oral three times a day  epoetin filiberto (EPOGEN) Injectable 88330 Unit(s) IV Push <User Schedule>  gabapentin 100 milliGRAM(s) Oral at bedtime  influenza   Vaccine 0.5 milliLiter(s) IntraMuscular once  multivitamin 1 Tablet(s) Oral daily  pantoprazole  Injectable 40 milliGRAM(s) IV Push two times a day    PRN Inpatient Medications  loperamide 2 milliGRAM(s) Oral every 6 hours PRN      VITALS  --------------------------------------------------------------------------------  T(C): 36.7 (12-22-23 @ 04:35), Max: 36.9 (12-21-23 @ 11:25)  HR: 81 (12-22-23 @ 04:35) (81 - 86)  BP: 149/78 (12-22-23 @ 04:35) (137/77 - 149/78)  RR: 18 (12-22-23 @ 04:35) (18 - 18)  SpO2: 95% (12-22-23 @ 04:35) (95% - 98%)  Wt(kg): --      12-21-23 @ 07:01  -  12-22-23 @ 07:00  --------------------------------------------------------  IN: 1000 mL / OUT: 0 mL / NET: 1000 mL      PHYSICAL EXAM:  General: no acute distress  Neuro: no focal deficits  HEENT: NC/AT, anicteric, no JVD  Pulmonary: lungs CTA B/L  Cardiovascular/Chest: +S1S2, RRR  GI/Abdomen: soft, NT/ND, +bowel sounds  Extremities: No edema  Skin: Warm and dry  Vascular access: MARIETTA NIETO    LABS/STUDIES  --------------------------------------------------------------------------------              8.2    4.62  >-----------<  103      [12-22-23 @ 05:57]              27.0     138  |  103  |  35  ----------------------------<  74      [12-22-23 @ 05:56]  4.5   |  20  |  9.53        Ca     8.8     [12-22-23 @ 05:56]      Mg     2.3     [12-22-23 @ 05:56]      Phos  4.8     [12-22-23 @ 05:56]    TPro  6.7  /  Alb  3.5  /  TBili  0.8  /  DBili  x   /  AST  17  /  ALT  15  /  AlkPhos  68  [12-21-23 @ 05:50]          Creatinine Trend:  SCr 9.53 [12-22 @ 05:56]  SCr 7.92 [12-21 @ 05:50]  SCr 6.13 [12-20 @ 07:16]  SCr 10.01 [12-19 @ 08:28]  SCr 9.64 [12-19 @ 01:44]    Urinalysis - [12-22-23 @ 05:56]      Color  / Appearance  / SG  / pH       Gluc 74 / Ketone   / Bili  / Urobili        Blood  / Protein  / Leuk Est  / Nitrite       RBC  / WBC  / Hyaline  / Gran  / Sq Epi  / Non Sq Epi  / Bacteria    [facility] DIVISION OF KIDNEY DISEASES AND HYPERTENSION   FOLLOW UP NOTE    --------------------------------------------------------------------------------    SUBJECTIVE / ROS / INTERVAL EVENTS:  - Patient seen and examined at bedside  - states diarrhea has improved, planned for colonoscopy tomorrow  - vitals stable, HD today      PAST HISTORY  --------------------------------------------------------------------------------  No significant changes to PMH, PSH, FHx, SHx, unless otherwise noted    ALLERGIES & MEDICATIONS  --------------------------------------------------------------------------------  Allergies    No Known Allergies    Intolerances      Standing Inpatient Medications  cefTRIAXone   IVPB 2000 milliGRAM(s) IV Intermittent every 24 hours  chlorhexidine 2% Cloths 1 Application(s) Topical <User Schedule>  danazol 200 milliGRAM(s) Oral three times a day  epoetin filiberto (EPOGEN) Injectable 43928 Unit(s) IV Push <User Schedule>  gabapentin 100 milliGRAM(s) Oral at bedtime  influenza   Vaccine 0.5 milliLiter(s) IntraMuscular once  multivitamin 1 Tablet(s) Oral daily  pantoprazole  Injectable 40 milliGRAM(s) IV Push two times a day    PRN Inpatient Medications  loperamide 2 milliGRAM(s) Oral every 6 hours PRN      VITALS  --------------------------------------------------------------------------------  T(C): 36.7 (12-22-23 @ 04:35), Max: 36.9 (12-21-23 @ 11:25)  HR: 81 (12-22-23 @ 04:35) (81 - 86)  BP: 149/78 (12-22-23 @ 04:35) (137/77 - 149/78)  RR: 18 (12-22-23 @ 04:35) (18 - 18)  SpO2: 95% (12-22-23 @ 04:35) (95% - 98%)  Wt(kg): --      12-21-23 @ 07:01  -  12-22-23 @ 07:00  --------------------------------------------------------  IN: 1000 mL / OUT: 0 mL / NET: 1000 mL      PHYSICAL EXAM:  General: no acute distress  Neuro: no focal deficits  HEENT: NC/AT, anicteric, no JVD  Pulmonary: lungs CTA B/L  Cardiovascular/Chest: +S1S2, RRR  GI/Abdomen: soft, NT/ND, +bowel sounds  Extremities: No edema  Skin: Warm and dry  Vascular access: MARIETTA NIETO    LABS/STUDIES  --------------------------------------------------------------------------------              8.2    4.62  >-----------<  103      [12-22-23 @ 05:57]              27.0     138  |  103  |  35  ----------------------------<  74      [12-22-23 @ 05:56]  4.5   |  20  |  9.53        Ca     8.8     [12-22-23 @ 05:56]      Mg     2.3     [12-22-23 @ 05:56]      Phos  4.8     [12-22-23 @ 05:56]    TPro  6.7  /  Alb  3.5  /  TBili  0.8  /  DBili  x   /  AST  17  /  ALT  15  /  AlkPhos  68  [12-21-23 @ 05:50]          Creatinine Trend:  SCr 9.53 [12-22 @ 05:56]  SCr 7.92 [12-21 @ 05:50]  SCr 6.13 [12-20 @ 07:16]  SCr 10.01 [12-19 @ 08:28]  SCr 9.64 [12-19 @ 01:44]    Urinalysis - [12-22-23 @ 05:56]      Color  / Appearance  / SG  / pH       Gluc 74 / Ketone   / Bili  / Urobili        Blood  / Protein  / Leuk Est  / Nitrite       RBC  / WBC  / Hyaline  / Gran  / Sq Epi  / Non Sq Epi  / Bacteria

## 2023-12-22 NOTE — PROGRESS NOTE ADULT - PROBLEM SELECTOR PLAN 3
Pt says she has had black diarrhea on Sunday and Monday. Since, brown diarrhea  - Hx of unspecified hepatic cirrhosis diagnosed here Oct 2022  now resolved

## 2023-12-22 NOTE — PROGRESS NOTE ADULT - PROBLEM SELECTOR PLAN 7
Diet: regular  VTE ppx: SCD, non i/s/o ?bleeding  Dispo: DC to home
Diet: regular  VTE ppx: SCD, non i/s/o ?bleeding  Dispo: medically active

## 2023-12-22 NOTE — DISCHARGE NOTE NURSING/CASE MANAGEMENT/SOCIAL WORK - NSDCVIVACCINE_GEN_ALL_CORE_FT
influenza, injectable, quadrivalent, preservative free; 20-Nov-2014 12:44; Aston Ulloa (RN); [employer]; [id number]; IntraMuscular; Deltoid Left.; 0.5 milliLiter(s);   influenza, injectable, quadrivalent, preservative free; 05-Oct-2016 18:15; Rosario James (RN); [employer]; [id number]; IntraMuscular; Deltoid Left.; 0.5 milliLiter(s); VIS (VIS Published: 07-Aug-2015, VIS Presented: 05-Oct-2016);    influenza, injectable, quadrivalent, preservative free; 20-Nov-2014 12:44; Aston Ulloa (RN); [employer]; [id number]; IntraMuscular; Deltoid Left.; 0.5 milliLiter(s);   influenza, injectable, quadrivalent, preservative free; 05-Oct-2016 18:15; Rosario James (RN); [employer]; [id number]; IntraMuscular; Deltoid Left.; 0.5 milliLiter(s); VIS (VIS Published: 07-Aug-2015, VIS Presented: 05-Oct-2016);

## 2023-12-22 NOTE — PROGRESS NOTE ADULT - SUBJECTIVE AND OBJECTIVE BOX
Interval Events:   No acute events overnight.  Spoke to patient via Telugu : 2011    Patient denied fever, chills, nausea, vomiting, abdominal pain, melena, hematochezia or hematemesis. Stool was dark brown, and she had 5 episodes of diarrhea yesterday. No BM in AM.       Hospital Medications:  chlorhexidine 2% Cloths 1 Application(s) Topical <User Schedule>  danazol 200 milliGRAM(s) Oral three times a day  epoetin filiberto (EPOGEN) Injectable 77357 Unit(s) IV Push <User Schedule>  gabapentin 100 milliGRAM(s) Oral at bedtime  heparin   Injectable 5000 Unit(s) SubCutaneous every 12 hours  influenza   Vaccine 0.5 milliLiter(s) IntraMuscular once  loperamide 2 milliGRAM(s) Oral every 6 hours PRN  multivitamin 1 Tablet(s) Oral daily  pantoprazole  Injectable 40 milliGRAM(s) IV Push two times a day      PHYSICAL EXAM:   Vital Signs:  Vital Signs Last 24 Hrs  T(C): 37.1 (22 Dec 2023 13:10), Max: 37.1 (22 Dec 2023 13:10)  T(F): 98.8 (22 Dec 2023 13:10), Max: 98.8 (22 Dec 2023 13:10)  HR: 97 (22 Dec 2023 13:10) (81 - 99)  BP: 128/70 (22 Dec 2023 13:10) (128/70 - 149/78)  BP(mean): --  RR: 18 (22 Dec 2023 13:10) (18 - 18)  SpO2: 98% (22 Dec 2023 13:10) (95% - 98%)    Parameters below as of 22 Dec 2023 13:10  Patient On (Oxygen Delivery Method): room air      Daily     Daily Weight in k.2 (22 Dec 2023 13:10)    GENERAL: no acute distress  NEURO: alert and oriented x 3  HEENT: NCAT, no conjunctival pallor appreciated; anicteric sclera  CHEST: no respiratory distress, no accessory muscle use  CARDIAC: regular rate, +S1/S2  ABDOMEN: soft, nontender, no rebound or guarding  EXTREMITIES: warm, well perfused  SKIN: no active lesions noted    LABS: reviewed                        8.2    4.62  )-----------( 103      ( 22 Dec 2023 05:57 )             27.0     12-    138  |  103  |  35<H>  ----------------------------<  74  4.5   |  20<L>  |  9.53<H>    Ca    8.8      22 Dec 2023 05:56  Phos  4.8       Mg     2.3         TPro  6.7  /  Alb  3.5  /  TBili  0.8  /  DBili  x   /  AST  17  /  ALT  15  /  AlkPhos  68         Interval Events:   No acute events overnight.  Spoke to patient via Romansh : 2011    Patient denied fever, chills, nausea, vomiting, abdominal pain, melena, hematochezia or hematemesis. Stool was dark brown, and she had 5 episodes of diarrhea yesterday. No BM in AM.       Hospital Medications:  chlorhexidine 2% Cloths 1 Application(s) Topical <User Schedule>  danazol 200 milliGRAM(s) Oral three times a day  epoetin filiberto (EPOGEN) Injectable 93561 Unit(s) IV Push <User Schedule>  gabapentin 100 milliGRAM(s) Oral at bedtime  heparin   Injectable 5000 Unit(s) SubCutaneous every 12 hours  influenza   Vaccine 0.5 milliLiter(s) IntraMuscular once  loperamide 2 milliGRAM(s) Oral every 6 hours PRN  multivitamin 1 Tablet(s) Oral daily  pantoprazole  Injectable 40 milliGRAM(s) IV Push two times a day      PHYSICAL EXAM:   Vital Signs:  Vital Signs Last 24 Hrs  T(C): 37.1 (22 Dec 2023 13:10), Max: 37.1 (22 Dec 2023 13:10)  T(F): 98.8 (22 Dec 2023 13:10), Max: 98.8 (22 Dec 2023 13:10)  HR: 97 (22 Dec 2023 13:10) (81 - 99)  BP: 128/70 (22 Dec 2023 13:10) (128/70 - 149/78)  BP(mean): --  RR: 18 (22 Dec 2023 13:10) (18 - 18)  SpO2: 98% (22 Dec 2023 13:10) (95% - 98%)    Parameters below as of 22 Dec 2023 13:10  Patient On (Oxygen Delivery Method): room air      Daily     Daily Weight in k.2 (22 Dec 2023 13:10)    GENERAL: no acute distress  NEURO: alert and oriented x 3  HEENT: NCAT, no conjunctival pallor appreciated; anicteric sclera  CHEST: no respiratory distress, no accessory muscle use  CARDIAC: regular rate, +S1/S2  ABDOMEN: soft, nontender, no rebound or guarding  EXTREMITIES: warm, well perfused  SKIN: no active lesions noted    LABS: reviewed                        8.2    4.62  )-----------( 103      ( 22 Dec 2023 05:57 )             27.0     12-    138  |  103  |  35<H>  ----------------------------<  74  4.5   |  20<L>  |  9.53<H>    Ca    8.8      22 Dec 2023 05:56  Phos  4.8       Mg     2.3         TPro  6.7  /  Alb  3.5  /  TBili  0.8  /  DBili  x   /  AST  17  /  ALT  15  /  AlkPhos  68

## 2023-12-22 NOTE — DISCHARGE NOTE PROVIDER - HOSPITAL COURSE
Patient is a 63-year-old female with a history of noncirrhotic gastric varices, hypertension, polycythemia vera and secondary myelofibrosis, splenomegaly, ESRD on HD M/W/F, last HD on Friday here for evaluation of weakness, dizziness and missed HD. She say she was waiting to get HD yesterday when she felt weak and had an episode of LoC for "a few seconds." She was brought to the ED. Patient states that she has not been feeling well since last night.  She thinks she had a subjective fever earlier today.  She states she felt very dizzy around 5 PM today when going to dialysis.  She states she has been having diarrhea which was black.  The diarrhea started on Sunday and continued until now but the black color only lasted a day. She denies any vomiting but reports nausea.  She also reports she has a cough with sputum.  No known sick contacts.  She states she still makes urine. Patient states she took tylenol around 4 pm. She states that she regular gets blood transfusions with last transfusion on Dec 1st. Denies any pain, SoB currently.    In the ED: VSS, WBC 5.9, Hb 8 (consistent with baseline), plt 83 (baseline ~110), potassium 6.0 s/p insulin + dextrose and calcium gluconate -> repeat K 5.2, abd U/S showed atrophic kidneys, cxr clear.    Hospital course: pt required 1u pRBCs for Hb<7. Since Hb has been at baseline. GI consulted, low suspicion for active bleed. c. diff, stool PCR negative. Diarrhea improved on loperamide. Pt received iHD while inpatient.    Pt stable for discharge on 12/22.    MEDICATION CHANGES:  NONE Patient is a 63-year-old female with a history of noncirrhotic gastric varices, hypertension, polycythemia vera and secondary myelofibrosis, splenomegaly, ESRD on HD M/W/F, last HD on Friday here for evaluation of weakness, dizziness and missed HD. She say she was waiting to get HD yesterday when she felt weak and had an episode of LoC for "a few seconds." She was brought to the ED. Patient states that she has not been feeling well since last night.  She thinks she had a subjective fever earlier today.  She states she felt very dizzy around 5 PM today when going to dialysis.  She states she has been having diarrhea which was black.  The diarrhea started on Sunday and continued until now but the black color only lasted a day. She denies any vomiting but reports nausea.  She also reports she has a cough with sputum.  No known sick contacts.  She states she still makes urine. Patient states she took tylenol around 4 pm. She states that she regular gets blood transfusions with last transfusion on Dec 1st. Denies any pain, SoB currently.    In the ED: VSS, WBC 5.9, Hb 8 (consistent with baseline), plt 83 (baseline ~110), potassium 6.0 s/p insulin + dextrose and calcium gluconate -> repeat K 5.2, abd U/S showed atrophic kidneys, cxr clear.    Hospital course: pt required 1u pRBCs for Hb<7. Since Hb has been at baseline. GI consulted, low suspicion for active bleed. c. diff, stool PCR negative. Diarrhea improved on loperamide. Pt received iHD while inpatient.    Pt stable for discharge on 12/22, patient is to follow up with PCP, Nephro, GI and Hem .    MEDICATION CHANGES:  NONE

## 2023-12-22 NOTE — DISCHARGE NOTE PROVIDER - NSFOLLOWUPCLINICS_GEN_ALL_ED_FT
[facility] General Internal Medicine  General Internal Medicine  [address] [id number]  Phone: [phone]  Fax:   Follow Up Time: 1 week     [facility] General Internal Medicine  General Internal Medicine  [address] [id number]  Phone: [phone]  Fax:   Follow Up Time: 1 week     [facility] General Internal Medicine  General Internal Medicine  [address] [place] [address]  Phone: [phone]  Fax:   Follow Up Time: 1 week    Gastroenterology at [facility]  Gastroenterology  [address] [place] [address]  Phone: [phone]  Fax:   Follow Up Time: 2 weeks     [facility] General Internal Medicine  General Internal Medicine  [address] [place] [address]  Phone: [phone]  Fax:   Follow Up Time: 1 week    Gastroenterology at [facility]  Gastroenterology  [address] [place] [address]  Phone: [phone]  Fax:   Follow Up Time: 2 weeks

## 2023-12-22 NOTE — DISCHARGE NOTE PROVIDER - NSFOLLOWUPCLINICSTOKEN_GEN_ALL_ED_FT
006946:1 week|| ||00\01||False; 082253:1 week|| ||00\01||False; 513606:1 week|| ||00\01||False;473712:2 weeks|| ||00\01||False; 511388:1 week|| ||00\01||False;886365:2 weeks|| ||00\01||False;

## 2023-12-22 NOTE — DISCHARGE NOTE NURSING/CASE MANAGEMENT/SOCIAL WORK - NSDCFUADDAPPT_GEN_ALL_CORE_FT
APPTS ARE READY TO BE MADE: [x] YES    Best Family or Patient Contact (if needed):    Additional Information about above appointments (if needed):    1: [facility] General Internal Medicine Clinic - [phone] - 1 week f/u  2: GI clinic - 2 week f/u  3:     Other comments or requests:     Dialysis [place] [address]. Please make sure to go this Sunday 12/24 at 12pm since the center is closed on Monday 12/25.   APPTS ARE READY TO BE MADE: [x] YES    Best Family or Patient Contact (if needed):    Additional Information about above appointments (if needed):    1: [facility] General Internal Medicine Clinic - [phone] - 1 week f/u  2: GI clinic - 2 week f/u  3:     Other comments or requests:     Dialysis [place] [address]. Please make sure to go this Sunday 12/24 at 12pm since the center is closed on Monday 12/25.

## 2023-12-22 NOTE — DISCHARGE NOTE NURSING/CASE MANAGEMENT/SOCIAL WORK - PATIENT PORTAL LINK FT
You can access the FollowMyHealth Patient Portal offered by [facility] by registering at the following website: [email or web address]. By joining [employer]’s FollowMyHealth portal, you will also be able to view your health information using other applications (apps) compatible with our system. You can access the FollowMyHealth Patient Portal offered by [facility] by registering at the following website: [email or web address]. By joining [facility]’s FollowMyHealth portal, you will also be able to view your health information using other applications (apps) compatible with our system.

## 2023-12-22 NOTE — DISCHARGE NOTE PROVIDER - NSDCCPCAREPLAN_GEN_ALL_CORE_FT
PRINCIPAL DISCHARGE DIAGNOSIS  Diagnosis: Diarrhea  Assessment and Plan of Treatment: You came in with diarrhea and fatigue. We determined that it was most likely due to a viral and/or bacterial infection in your gut. Your diarrhea improved after several days.     PRINCIPAL DISCHARGE DIAGNOSIS  Diagnosis: Diarrhea  Assessment and Plan of Treatment: You came in with diarrhea and fatigue. We determined that it was most likely due to a viral and/or bacterial infection in your gut. Your diarrhea improved after several days. We consulted our GI team which conluded you are unlikely to be actively bleeding from your GI track.  We recommend follow up with your PCP within 1 week to ensure continued recovery from this illness.     calm

## 2023-12-24 LAB
CULTURE RESULTS: SIGNIFICANT CHANGE UP
SPECIMEN SOURCE: SIGNIFICANT CHANGE UP

## 2023-12-28 ENCOUNTER — RESULT REVIEW (OUTPATIENT)
Age: 63
End: 2023-12-28

## 2023-12-28 ENCOUNTER — APPOINTMENT (OUTPATIENT)
Dept: HEMATOLOGY ONCOLOGY | Facility: CLINIC | Age: 63
End: 2023-12-28
Payer: COMMERCIAL

## 2023-12-28 VITALS
WEIGHT: 126.74 LBS | BODY MASS INDEX: 23.93 KG/M2 | OXYGEN SATURATION: 99 % | RESPIRATION RATE: 17 BRPM | SYSTOLIC BLOOD PRESSURE: 125 MMHG | HEIGHT: 61.02 IN | HEART RATE: 72 BPM | DIASTOLIC BLOOD PRESSURE: 79 MMHG | TEMPERATURE: 98.5 F

## 2023-12-28 LAB
ANISOCYTOSIS BLD QL: SLIGHT — SIGNIFICANT CHANGE UP
ANISOCYTOSIS BLD QL: SLIGHT — SIGNIFICANT CHANGE UP
BASOPHILS # BLD AUTO: 0 K/UL — SIGNIFICANT CHANGE UP (ref 0–0.2)
BASOPHILS # BLD AUTO: 0 K/UL — SIGNIFICANT CHANGE UP (ref 0–0.2)
BASOPHILS NFR BLD AUTO: 0 % — SIGNIFICANT CHANGE UP (ref 0–2)
BASOPHILS NFR BLD AUTO: 0 % — SIGNIFICANT CHANGE UP (ref 0–2)
BLASTS # FLD: 1 % — HIGH (ref 0–0)
BLASTS # FLD: 1 % — HIGH (ref 0–0)
DACRYOCYTES BLD QL SMEAR: SLIGHT — SIGNIFICANT CHANGE UP
DACRYOCYTES BLD QL SMEAR: SLIGHT — SIGNIFICANT CHANGE UP
ELLIPTOCYTES BLD QL SMEAR: SLIGHT — SIGNIFICANT CHANGE UP
ELLIPTOCYTES BLD QL SMEAR: SLIGHT — SIGNIFICANT CHANGE UP
EOSINOPHIL # BLD AUTO: 0.44 K/UL — SIGNIFICANT CHANGE UP (ref 0–0.5)
EOSINOPHIL # BLD AUTO: 0.44 K/UL — SIGNIFICANT CHANGE UP (ref 0–0.5)
EOSINOPHIL NFR BLD AUTO: 5 % — SIGNIFICANT CHANGE UP (ref 0–6)
EOSINOPHIL NFR BLD AUTO: 5 % — SIGNIFICANT CHANGE UP (ref 0–6)
HCT VFR BLD CALC: 32.7 % — LOW (ref 34.5–45)
HCT VFR BLD CALC: 32.7 % — LOW (ref 34.5–45)
HGB BLD-MCNC: 9.8 G/DL — LOW (ref 11.5–15.5)
HGB BLD-MCNC: 9.8 G/DL — LOW (ref 11.5–15.5)
LYMPHOCYTES # BLD AUTO: 1.59 K/UL — SIGNIFICANT CHANGE UP (ref 1–3.3)
LYMPHOCYTES # BLD AUTO: 1.59 K/UL — SIGNIFICANT CHANGE UP (ref 1–3.3)
LYMPHOCYTES # BLD AUTO: 18 % — SIGNIFICANT CHANGE UP (ref 13–44)
LYMPHOCYTES # BLD AUTO: 18 % — SIGNIFICANT CHANGE UP (ref 13–44)
MCHC RBC-ENTMCNC: 29.9 PG — SIGNIFICANT CHANGE UP (ref 27–34)
MCHC RBC-ENTMCNC: 29.9 PG — SIGNIFICANT CHANGE UP (ref 27–34)
MCHC RBC-ENTMCNC: 30 G/DL — LOW (ref 32–36)
MCHC RBC-ENTMCNC: 30 G/DL — LOW (ref 32–36)
MCV RBC AUTO: 99.7 FL — SIGNIFICANT CHANGE UP (ref 80–100)
MCV RBC AUTO: 99.7 FL — SIGNIFICANT CHANGE UP (ref 80–100)
MONOCYTES # BLD AUTO: 0.71 K/UL — SIGNIFICANT CHANGE UP (ref 0–0.9)
MONOCYTES # BLD AUTO: 0.71 K/UL — SIGNIFICANT CHANGE UP (ref 0–0.9)
MONOCYTES NFR BLD AUTO: 8 % — SIGNIFICANT CHANGE UP (ref 2–14)
MONOCYTES NFR BLD AUTO: 8 % — SIGNIFICANT CHANGE UP (ref 2–14)
MYELOCYTES NFR BLD: 2 % — HIGH (ref 0–0)
MYELOCYTES NFR BLD: 2 % — HIGH (ref 0–0)
NEUTROPHILS # BLD AUTO: 5.82 K/UL — SIGNIFICANT CHANGE UP (ref 1.8–7.4)
NEUTROPHILS # BLD AUTO: 5.82 K/UL — SIGNIFICANT CHANGE UP (ref 1.8–7.4)
NEUTROPHILS NFR BLD AUTO: 66 % — SIGNIFICANT CHANGE UP (ref 43–77)
NEUTROPHILS NFR BLD AUTO: 66 % — SIGNIFICANT CHANGE UP (ref 43–77)
NRBC # BLD: 3 /100 WBCS — HIGH (ref 0–0)
NRBC # BLD: 3 /100 WBCS — HIGH (ref 0–0)
NRBC # BLD: SIGNIFICANT CHANGE UP /100 WBCS (ref 0–0)
NRBC # BLD: SIGNIFICANT CHANGE UP /100 WBCS (ref 0–0)
PLAT MORPH BLD: NORMAL — SIGNIFICANT CHANGE UP
PLAT MORPH BLD: NORMAL — SIGNIFICANT CHANGE UP
PLATELET # BLD AUTO: SIGNIFICANT CHANGE UP K/UL (ref 150–400)
PLATELET # BLD AUTO: SIGNIFICANT CHANGE UP K/UL (ref 150–400)
POIKILOCYTOSIS BLD QL AUTO: SIGNIFICANT CHANGE UP
POIKILOCYTOSIS BLD QL AUTO: SIGNIFICANT CHANGE UP
POLYCHROMASIA BLD QL SMEAR: SLIGHT — SIGNIFICANT CHANGE UP
POLYCHROMASIA BLD QL SMEAR: SLIGHT — SIGNIFICANT CHANGE UP
RBC # BLD: 3.28 M/UL — LOW (ref 3.8–5.2)
RBC # BLD: 3.28 M/UL — LOW (ref 3.8–5.2)
RBC # FLD: 21.5 % — HIGH (ref 10.3–14.5)
RBC # FLD: 21.5 % — HIGH (ref 10.3–14.5)
RBC BLD AUTO: ABNORMAL
RBC BLD AUTO: ABNORMAL
RETICS #: 258 K/UL — HIGH (ref 25–125)
RETICS #: 258 K/UL — HIGH (ref 25–125)
RETICS/RBC NFR: 7.9 % — HIGH (ref 0.5–2.5)
RETICS/RBC NFR: 7.9 % — HIGH (ref 0.5–2.5)
SCHISTOCYTES BLD QL AUTO: SLIGHT — SIGNIFICANT CHANGE UP
SCHISTOCYTES BLD QL AUTO: SLIGHT — SIGNIFICANT CHANGE UP
WBC # BLD: 8.82 K/UL — SIGNIFICANT CHANGE UP (ref 3.8–10.5)
WBC # BLD: 8.82 K/UL — SIGNIFICANT CHANGE UP (ref 3.8–10.5)
WBC # FLD AUTO: 8.82 K/UL — SIGNIFICANT CHANGE UP (ref 3.8–10.5)
WBC # FLD AUTO: 8.82 K/UL — SIGNIFICANT CHANGE UP (ref 3.8–10.5)

## 2023-12-28 PROCEDURE — 99214 OFFICE O/P EST MOD 30 MIN: CPT

## 2023-12-28 PROCEDURE — 86900 BLOOD TYPING SEROLOGIC ABO: CPT

## 2023-12-28 PROCEDURE — 86850 RBC ANTIBODY SCREEN: CPT

## 2023-12-28 PROCEDURE — 86901 BLOOD TYPING SEROLOGIC RH(D): CPT

## 2023-12-28 PROCEDURE — 86922 COMPATIBILITY TEST ANTIGLOB: CPT

## 2023-12-28 PROCEDURE — 86902 BLOOD TYPE ANTIGEN DONOR EA: CPT

## 2023-12-28 PROCEDURE — 86880 COOMBS TEST DIRECT: CPT

## 2023-12-29 ENCOUNTER — OUTPATIENT (OUTPATIENT)
Dept: OUTPATIENT SERVICES | Facility: HOSPITAL | Age: 63
LOS: 1 days | End: 2023-12-29
Payer: SELF-PAY

## 2023-12-29 ENCOUNTER — APPOINTMENT (OUTPATIENT)
Dept: INTERNAL MEDICINE | Facility: CLINIC | Age: 63
End: 2023-12-29
Payer: COMMERCIAL

## 2023-12-29 VITALS
HEIGHT: 61.02 IN | HEART RATE: 90 BPM | DIASTOLIC BLOOD PRESSURE: 70 MMHG | WEIGHT: 126 LBS | SYSTOLIC BLOOD PRESSURE: 124 MMHG | OXYGEN SATURATION: 98 % | BODY MASS INDEX: 23.79 KG/M2

## 2023-12-29 DIAGNOSIS — T85.898A OTHER SPECIFIED COMPLICATION OF OTHER INTERNAL PROSTHETIC DEVICES, IMPLANTS AND GRAFTS, INITIAL ENCOUNTER: Chronic | ICD-10-CM

## 2023-12-29 DIAGNOSIS — Z99.2 DEPENDENCE ON RENAL DIALYSIS: Chronic | ICD-10-CM

## 2023-12-29 DIAGNOSIS — I10 ESSENTIAL (PRIMARY) HYPERTENSION: ICD-10-CM

## 2023-12-29 PROCEDURE — G0463: CPT

## 2023-12-29 PROCEDURE — 80053 COMPREHEN METABOLIC PANEL: CPT

## 2023-12-29 PROCEDURE — 99213 OFFICE O/P EST LOW 20 MIN: CPT | Mod: GC

## 2023-12-29 PROCEDURE — T1013: CPT

## 2024-01-01 NOTE — DISCUSSION/SUMMARY
[Post Fistulogram/Intervention] : Post Fistulogram/Intervention: Site check for bleeding/hematoma, thrill/bruit. Vitals signs: On admission, then q15 mins x 2
[Post Fistulogram/Intervention] : Post Fistulogram/Intervention: Site check for bleeding/hematoma, thrill/bruit. Vitals signs: On admission, then q15 mins x 2
-Vomiting often or vomiting green material

## 2024-01-02 LAB
FERRITIN SERPL-MCNC: 2252 NG/ML
FOLATE SERPL-MCNC: >20 NG/ML
HAPTOGLOB SERPL-MCNC: 43 MG/DL
LDH SERPL-CCNC: 473 U/L
VIT B12 SERPL-MCNC: >2000 PG/ML

## 2024-01-02 NOTE — HISTORY OF PRESENT ILLNESS
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   #Diarrhea  - Started 3.5 weeks ago  - Previously 10 diarrhea BM/day, now 4 soft stools a day  - Improving over the past 2 days  - Worse with eating  - Was prescribed Loperamide but did not take it due to concerns of turning stool black  - GI evaluated in hospital, did not see GI outpatient. Per GI, likely post-infectious etiology  - Denies weight loss, endorses weakness  - Previously black stool, now green-brown stool   #PCV  - Saw Dr. Guo from hematology  - On Ojarro  - Recent Hgb improving, patient told by Dr. Guo that there are plans to adjust medications

## 2024-01-02 NOTE — REVIEW OF SYSTEMS
[Abdominal Pain] : abdominal pain [Diarrhea] : diarrhea [Negative] : Heme/Lymph [Fever] : no fever [Melena] : no melena [FreeTextEntry2] : Weakness  [FreeTextEntry7] : See HPI

## 2024-01-02 NOTE — PHYSICAL EXAM
[No Acute Distress] : no acute distress [Well Nourished] : well nourished [Well Developed] : well developed [Well-Appearing] : well-appearing [Normal Sclera/Conjunctiva] : normal sclera/conjunctiva [PERRL] : pupils equal round and reactive to light [EOMI] : extraocular movements intact [Normal Outer Ear/Nose] : the outer ears and nose were normal in appearance [Normal Oropharynx] : the oropharynx was normal [No JVD] : no jugular venous distention [No Lymphadenopathy] : no lymphadenopathy [Supple] : supple [Thyroid Normal, No Nodules] : the thyroid was normal and there were no nodules present [No Respiratory Distress] : no respiratory distress  [No Accessory Muscle Use] : no accessory muscle use [Clear to Auscultation] : lungs were clear to auscultation bilaterally [Normal Rate] : normal rate  [Regular Rhythm] : with a regular rhythm [Normal S1, S2] : normal S1 and S2 [No Murmur] : no murmur heard [No Carotid Bruits] : no carotid bruits [No Abdominal Bruit] : a ~M bruit was not heard ~T in the abdomen [No Varicosities] : no varicosities [Pedal Pulses Present] : the pedal pulses are present [No Edema] : there was no peripheral edema [No Palpable Aorta] : no palpable aorta [No Extremity Clubbing/Cyanosis] : no extremity clubbing/cyanosis [Soft] : abdomen soft [Non Tender] : non-tender [No HSM] : no HSM [Normal Bowel Sounds] : normal bowel sounds [Normal Posterior Cervical Nodes] : no posterior cervical lymphadenopathy [Normal Anterior Cervical Nodes] : no anterior cervical lymphadenopathy [No CVA Tenderness] : no CVA  tenderness [No Spinal Tenderness] : no spinal tenderness [No Joint Swelling] : no joint swelling [Grossly Normal Strength/Tone] : grossly normal strength/tone [No Rash] : no rash [Coordination Grossly Intact] : coordination grossly intact [No Focal Deficits] : no focal deficits [Normal Gait] : normal gait [Deep Tendon Reflexes (DTR)] : deep tendon reflexes were 2+ and symmetric [Normal Affect] : the affect was normal [Normal Insight/Judgement] : insight and judgment were intact [de-identified] : + splenomegaly

## 2024-01-02 NOTE — INTERPRETER SERVICES
[[employer] ] : provided by [employer]   [Time Spent: ____ minutes] : Total time spent using  services: [unfilled] minutes. The patient's primary language is not English thus required  services. [Interpreters_IDNumber] : [id number] [Interpreters_FullName] : Anthony [TWNoteComboBox1] : Azeri

## 2024-01-03 LAB
ALBUMIN SERPL ELPH-MCNC: 4.1 G/DL
ALP BLD-CCNC: 81 U/L
ALT SERPL-CCNC: 21 U/L
ANION GAP SERPL CALC-SCNC: 19 MMOL/L
AST SERPL-CCNC: 27 U/L
BILIRUB SERPL-MCNC: 1.8 MG/DL
BUN SERPL-MCNC: 29 MG/DL
CALCIUM SERPL-MCNC: 8.6 MG/DL
CHLORIDE SERPL-SCNC: 100 MMOL/L
CO2 SERPL-SCNC: 20 MMOL/L
CREAT SERPL-MCNC: 8.76 MG/DL
EGFR: 5 ML/MIN/1.73M2
GLUCOSE SERPL-MCNC: 116 MG/DL
POTASSIUM SERPL-SCNC: 5.2 MMOL/L
PROT SERPL-MCNC: 6.9 G/DL
SODIUM SERPL-SCNC: 140 MMOL/L

## 2024-01-03 NOTE — ASSESSMENT
[FreeTextEntry1] : This is a 63 woman with a history of Polycythemia Vera, MAK 2 + since 2012. 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 She is now under my super vision for the P. Vera.  Jakafi was initially started at 15mg TIW. Dose was decreased to 10mg TIW due to anemia though that was likely form the lack of danazol. Since then dose was adjusted back to 15mg then to 20mg TIW.  Patient was taken off Jakafi with intent to switch to momelotinib (Ojjaara) 200mg daily in setting of dialysis.  A cough developed so we asked patient to hold off o on the start for this.  Patient ended up being admitted for progression of the abdominal pain from splenomegaly.   Patient was started on the Ojjaara after notification of the splenomegaly, has only been on this for 3-4 days.   Hg nazia to 9.8 fairly rapidly.  Suspect this was from a combination of halting the Jakafi as well as starting the Ojjaara.  Given the rapid rise, recommended patient stop the danazol such that we do not cause a hypercoagulable state with too high of a hg.   Can always restart the Danazole again if needed.   Splenomegaly still hurts, but patient has only been on the momelotib for 3-4 days. Would continue this, if the pain continues we can restart hydroxyurea.

## 2024-01-03 NOTE — HISTORY OF PRESENT ILLNESS
[de-identified] :  Jia [id number]  Patient started the Ojarro this past week during her coughing period. We had held off on starting it for aw Elim IRA for the illness to clear. The cough went on for another 2 weeks forcing us to start despite the persistent cough.    Has only been on the Ojarro for 3 days alongside the danozole 600mg.  Given the imrpvoement very rapidly to Hg 9.8g/dl as of today, will discontinue the Danazole.    Patient returns for follow up after a hospital stay for severe anemia.  was admitted 11/26 and discharged 11/29 Patient coughing and congested for 3  weeks prior to the visit to the ED for the HG 6.4g/dl on admission. was transfused and Hg actually want down a little to 6.3 before a 2nd unit was transfused and patient discharged with a hG 7.4g/dl.   Ojjaro Momelotinb 200mg.  to use along side the Danazol 600mg and replace the Jakafi.   MAK 2 and ACtivin A inhibitor for MF patients with anemia.

## 2024-01-04 ENCOUNTER — OUTPATIENT (OUTPATIENT)
Dept: OUTPATIENT SERVICES | Facility: HOSPITAL | Age: 64
LOS: 1 days | End: 2024-01-04

## 2024-01-04 ENCOUNTER — APPOINTMENT (OUTPATIENT)
Dept: GASTROENTEROLOGY | Facility: CLINIC | Age: 64
End: 2024-01-04
Payer: COMMERCIAL

## 2024-01-04 ENCOUNTER — NON-APPOINTMENT (OUTPATIENT)
Age: 64
End: 2024-01-04

## 2024-01-04 VITALS
SYSTOLIC BLOOD PRESSURE: 146 MMHG | HEIGHT: 62 IN | HEART RATE: 90 BPM | DIASTOLIC BLOOD PRESSURE: 79 MMHG | BODY MASS INDEX: 23.19 KG/M2 | OXYGEN SATURATION: 99 % | WEIGHT: 126 LBS

## 2024-01-04 DIAGNOSIS — I77.0 ARTERIOVENOUS FISTULA, ACQUIRED: Chronic | ICD-10-CM

## 2024-01-04 DIAGNOSIS — Z99.2 DEPENDENCE ON RENAL DIALYSIS: Chronic | ICD-10-CM

## 2024-01-04 DIAGNOSIS — Z12.11 ENCOUNTER FOR SCREENING FOR MALIGNANT NEOPLASM OF COLON: ICD-10-CM

## 2024-01-04 DIAGNOSIS — T85.898A OTHER SPECIFIED COMPLICATION OF OTHER INTERNAL PROSTHETIC DEVICES, IMPLANTS AND GRAFTS, INITIAL ENCOUNTER: Chronic | ICD-10-CM

## 2024-01-04 PROCEDURE — ZZZZZ: CPT | Mod: GC

## 2024-01-08 DIAGNOSIS — K76.6 PORTAL HYPERTENSION: ICD-10-CM

## 2024-01-08 DIAGNOSIS — Z12.11 ENCOUNTER FOR SCREENING FOR MALIGNANT NEOPLASM OF COLON: ICD-10-CM

## 2024-01-08 DIAGNOSIS — R19.7 DIARRHEA, UNSPECIFIED: ICD-10-CM

## 2024-01-08 NOTE — CHART NOTE - NSCHARTNOTEFT_GEN_A_CORE
1.  acute on chronic Anemia is likely multifactorial : ESRD, myelofibrosis, slow unidentified GI bleed (low suspicion for active bleeding at this time as per GI team).  With that being said, the anemia is of unclear etiology       Ann Fuentes   HOspitalist   available on TEAMS

## 2024-01-09 DIAGNOSIS — R19.7 DIARRHEA, UNSPECIFIED: ICD-10-CM

## 2024-01-10 NOTE — ASSESSMENT
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  - no nausea/vomiting, melena  #Diarrhea; improving; now with 3-4 episodes and becoming more thick - recent admission for this; CT A/P non-contrast showing splenomegaly but otherwise unremarkable; GI PCR and C. diff negative; last colonoscopy 2016 with 4mm sigmoid polyp (path TA)  #colon cancer screening - last colonoscopy 2016 with 4mm TA in sigmoid c/w low-risk adenoma  Plan: - if recurrent diarrhea, plan to check O&P and fecal elastase; if normal, can consider loperamide as prescribed from hospitalization - increase coreg to 6.25 BID; SBP ranging 130-140s at home and in clinic - EGD for surveillance of gastric varices - colonoscopy for colon cancer screening Note: patient does not have insurance and is currently self-pay. Will plan to set up sliding scale insurance ( asked to assist with this) and have patient follow up for EGD/colonoscopy  Discussed with Dr. July Hernandez MD Gastroenterology/Hepatology Fellow, PGY4

## 2024-01-10 NOTE — HISTORY OF PRESENT ILLNESS
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 Pt reports developing diarrhea 3-4 weeks ago and having 10 episodes per day; no associated fevers, chills, recent travels. Pt underwent infectious workup at the time and CT A/P non-contrast showing splenomegaly but otherwise unremarkable; GI PCR and C. diff negative. Reports diarrhea is now improving and having 3-4 episodes per day and becoming more thick. Last EGD/ERCP  2022 notable for IGV1; EGD 2021 with IGV1 and IGV2. Last colonoscopy 2016 with 4mm sigmoid polyp (path TA).   Of note, pt with known hx of gastric varices 2/2 nodular regenerative hyperplasia for which she has been treated with carvedilol; previously on 3.125 BID, although she has not been taking it since her hospitalization due to episodes of hypotension with SBP 90s. SBP now ranging 130-140s. No light-headedness or dizziness. No hematemesis.

## 2024-01-10 NOTE — REVIEW OF SYSTEMS
[Diarrhea] : diarrhea [Fever] : no fever [Chills] : no chills [Shortness Of Breath] : no shortness of breath [Abdominal Pain] : no abdominal pain [Vomiting] : no vomiting [Constipation] : no constipation [Heartburn] : no heartburn [Melena (black stool)] : no melena [FreeTextEntry7] : (+) nausea

## 2024-01-10 NOTE — END OF VISIT
[Time Spent: ___ minutes] : I have spent [unfilled] minutes of time on the encounter. [FreeTextEntry3] : 64 yo Armenian speaking Female, accompanied by , with Hx of myelofibrosis, polycythemia vera (JAK2+), ESRD on HD, noncirrhotic portal hypertension due to nodular regenerative hyperplasia, c/b gastric varices, also Hx of choledocholithiasis s/p stent 9/2022 (removed 10/2022), c/b pancreatitis, s/p cholecystectomy, and hospitalization at [facility] 12/19-12/22/23 for acute diarrheal episode and syncopal episode, is here for follow up for her [facility]. Last endoscopic evaluation in 2022, and has been off carvedilol for some time, will try to arrange repeat EGD for surveillance of varices. Last colonoscopy 2016, 4 mm TA, will try to arrange CRC screening (for latre timepoint given relatively recent acute diarrheal episode). All depending on insurance/authorization as patient is currently self pay.  Diarrhea improved, but still send O/P, and assess for pancreatic insufficiency.

## 2024-01-13 ENCOUNTER — OUTPATIENT (OUTPATIENT)
Dept: OUTPATIENT SERVICES | Facility: HOSPITAL | Age: 64
LOS: 1 days | Discharge: HOME | End: 2024-01-13

## 2024-01-13 DIAGNOSIS — T85.898A OTHER SPECIFIED COMPLICATION OF OTHER INTERNAL PROSTHETIC DEVICES, IMPLANTS AND GRAFTS, INITIAL ENCOUNTER: Chronic | ICD-10-CM

## 2024-01-13 DIAGNOSIS — D64.9 ANEMIA, UNSPECIFIED: ICD-10-CM

## 2024-01-13 DIAGNOSIS — Z99.2 DEPENDENCE ON RENAL DIALYSIS: Chronic | ICD-10-CM

## 2024-01-13 DIAGNOSIS — I77.0 ARTERIOVENOUS FISTULA, ACQUIRED: Chronic | ICD-10-CM

## 2024-01-17 ENCOUNTER — RESULT REVIEW (OUTPATIENT)
Age: 64
End: 2024-01-17

## 2024-01-17 ENCOUNTER — APPOINTMENT (OUTPATIENT)
Dept: HEMATOLOGY ONCOLOGY | Facility: CLINIC | Age: 64
End: 2024-01-17
Payer: COMMERCIAL

## 2024-01-17 ENCOUNTER — APPOINTMENT (OUTPATIENT)
Dept: HEMATOLOGY ONCOLOGY | Facility: CLINIC | Age: 64
End: 2024-01-17

## 2024-01-17 VITALS
RESPIRATION RATE: 16 BRPM | SYSTOLIC BLOOD PRESSURE: 149 MMHG | TEMPERATURE: 97.9 F | OXYGEN SATURATION: 98 % | BODY MASS INDEX: 22.65 KG/M2 | WEIGHT: 123.08 LBS | HEIGHT: 62 IN | HEART RATE: 80 BPM | DIASTOLIC BLOOD PRESSURE: 81 MMHG

## 2024-01-17 DIAGNOSIS — D64.9 ANEMIA, UNSPECIFIED: ICD-10-CM

## 2024-01-17 LAB
ANISOCYTOSIS BLD QL: SLIGHT — SIGNIFICANT CHANGE UP
BASOPHILS # BLD AUTO: 0.24 K/UL — HIGH (ref 0–0.2)
BASOPHILS NFR BLD AUTO: 3 % — HIGH (ref 0–2)
DACRYOCYTES BLD QL SMEAR: SLIGHT — SIGNIFICANT CHANGE UP
ELLIPTOCYTES BLD QL SMEAR: SLIGHT — SIGNIFICANT CHANGE UP
EOSINOPHIL # BLD AUTO: 0.24 K/UL — SIGNIFICANT CHANGE UP (ref 0–0.5)
EOSINOPHIL NFR BLD AUTO: 3 % — SIGNIFICANT CHANGE UP (ref 0–6)
HCT VFR BLD CALC: 37.3 % — SIGNIFICANT CHANGE UP (ref 34.5–45)
HGB BLD-MCNC: 11.1 G/DL — LOW (ref 11.5–15.5)
LYMPHOCYTES # BLD AUTO: 0.64 K/UL — LOW (ref 1–3.3)
LYMPHOCYTES # BLD AUTO: 8 % — LOW (ref 13–44)
MACROCYTES BLD QL: SLIGHT — SIGNIFICANT CHANGE UP
MCHC RBC-ENTMCNC: 29.8 G/DL — LOW (ref 32–36)
MCHC RBC-ENTMCNC: 30.6 PG — SIGNIFICANT CHANGE UP (ref 27–34)
MCV RBC AUTO: 102.8 FL — HIGH (ref 80–100)
MONOCYTES # BLD AUTO: 0.24 K/UL — SIGNIFICANT CHANGE UP (ref 0–0.9)
MONOCYTES NFR BLD AUTO: 3 % — SIGNIFICANT CHANGE UP (ref 2–14)
MYELOCYTES NFR BLD: 4 % — HIGH (ref 0–0)
NEUTROPHILS # BLD AUTO: 6.34 K/UL — SIGNIFICANT CHANGE UP (ref 1.8–7.4)
NEUTROPHILS NFR BLD AUTO: 79 % — HIGH (ref 43–77)
NRBC # BLD: 5 /100 WBCS — HIGH (ref 0–0)
NRBC # BLD: SIGNIFICANT CHANGE UP /100 WBCS (ref 0–0)
PLAT MORPH BLD: NORMAL — SIGNIFICANT CHANGE UP
PLATELET # BLD AUTO: 126 K/UL — LOW (ref 150–400)
POIKILOCYTOSIS BLD QL AUTO: SIGNIFICANT CHANGE UP
POLYCHROMASIA BLD QL SMEAR: SLIGHT — SIGNIFICANT CHANGE UP
RBC # BLD: 3.63 M/UL — LOW (ref 3.8–5.2)
RBC # FLD: 21.7 % — HIGH (ref 10.3–14.5)
RBC BLD AUTO: ABNORMAL
RETICS #: 255.9 K/UL — HIGH (ref 25–125)
RETICS/RBC NFR: 7.1 % — HIGH (ref 0.5–2.5)
SCHISTOCYTES BLD QL AUTO: SLIGHT — SIGNIFICANT CHANGE UP
WBC # BLD: 8.03 K/UL — SIGNIFICANT CHANGE UP (ref 3.8–10.5)
WBC # FLD AUTO: 8.03 K/UL — SIGNIFICANT CHANGE UP (ref 3.8–10.5)

## 2024-01-17 PROCEDURE — 99215 OFFICE O/P EST HI 40 MIN: CPT

## 2024-01-17 RX ORDER — AMOXICILLIN 500 MG/1
500 CAPSULE ORAL
Qty: 15 | Refills: 0 | Status: DISCONTINUED | COMMUNITY
Start: 2023-11-30 | End: 2024-01-17

## 2024-01-17 RX ORDER — RUXOLITINIB 20 MG/1
20 TABLET ORAL
Qty: 36 | Refills: 1 | Status: DISCONTINUED | COMMUNITY
Start: 2021-06-22 | End: 2024-01-17

## 2024-01-17 RX ORDER — DANAZOL 200 MG/1
200 CAPSULE ORAL 3 TIMES DAILY
Qty: 90 | Refills: 3 | Status: DISCONTINUED | COMMUNITY
Start: 2023-03-09 | End: 2024-01-17

## 2024-01-19 ENCOUNTER — OUTPATIENT (OUTPATIENT)
Dept: OUTPATIENT SERVICES | Facility: HOSPITAL | Age: 64
LOS: 1 days | End: 2024-01-19
Payer: SELF-PAY

## 2024-01-19 ENCOUNTER — APPOINTMENT (OUTPATIENT)
Dept: INTERNAL MEDICINE | Facility: CLINIC | Age: 64
End: 2024-01-19
Payer: COMMERCIAL

## 2024-01-19 ENCOUNTER — RESULT REVIEW (OUTPATIENT)
Age: 64
End: 2024-01-19

## 2024-01-19 VITALS
HEART RATE: 89 BPM | BODY MASS INDEX: 22.82 KG/M2 | HEIGHT: 62 IN | OXYGEN SATURATION: 98 % | TEMPERATURE: 98.9 F | SYSTOLIC BLOOD PRESSURE: 130 MMHG | DIASTOLIC BLOOD PRESSURE: 70 MMHG | WEIGHT: 124 LBS

## 2024-01-19 DIAGNOSIS — Z13.39 ENCOUNTER FOR SCREENING EXAM FOR OTHER MENTAL HEALTH AND BEHAVIORAL DISORDERS: ICD-10-CM

## 2024-01-19 DIAGNOSIS — I77.0 ARTERIOVENOUS FISTULA, ACQUIRED: ICD-10-CM

## 2024-01-19 DIAGNOSIS — I10 ESSENTIAL (PRIMARY) HYPERTENSION: ICD-10-CM

## 2024-01-19 DIAGNOSIS — K76.6 PORTAL HYPERTENSION: ICD-10-CM

## 2024-01-19 DIAGNOSIS — Z99.2 DEPENDENCE ON RENAL DIALYSIS: Chronic | ICD-10-CM

## 2024-01-19 DIAGNOSIS — Z00.00 ENCOUNTER FOR GENERAL ADULT MEDICAL EXAMINATION WITHOUT ABNORMAL FINDINGS: ICD-10-CM

## 2024-01-19 DIAGNOSIS — T85.898A OTHER SPECIFIED COMPLICATION OF OTHER INTERNAL PROSTHETIC DEVICES, IMPLANTS AND GRAFTS, INITIAL ENCOUNTER: Chronic | ICD-10-CM

## 2024-01-19 DIAGNOSIS — Z12.39 ENCOUNTER FOR OTHER SCREENING FOR MALIGNANT NEOPLASM OF BREAST: ICD-10-CM

## 2024-01-19 DIAGNOSIS — Z13.39 ENCOUNTER FOR SCREENING EXAMINATION FOR OTHER MENTAL HEALTH AND BEHAVIORAL DISORDERS: ICD-10-CM

## 2024-01-19 DIAGNOSIS — I77.0 ARTERIOVENOUS FISTULA, ACQUIRED: Chronic | ICD-10-CM

## 2024-01-19 DIAGNOSIS — D45 POLYCYTHEMIA VERA: ICD-10-CM

## 2024-01-19 DIAGNOSIS — R15.9 FULL INCONTINENCE OF FECES: ICD-10-CM

## 2024-01-19 DIAGNOSIS — R92.30 DENSE BREASTS, UNSPECIFIED: ICD-10-CM

## 2024-01-19 DIAGNOSIS — Z13.31 ENCOUNTER FOR SCREENING FOR DEPRESSION: ICD-10-CM

## 2024-01-19 DIAGNOSIS — N18.6 END STAGE RENAL DISEASE: ICD-10-CM

## 2024-01-19 DIAGNOSIS — D75.81 MYELOFIBROSIS: ICD-10-CM

## 2024-01-19 PROCEDURE — G0442 ANNUAL ALCOHOL SCREEN 15 MIN: CPT

## 2024-01-19 PROCEDURE — G0463: CPT

## 2024-01-19 PROCEDURE — 99396 PREV VISIT EST AGE 40-64: CPT

## 2024-01-19 PROCEDURE — G0444 DEPRESSION SCREEN ANNUAL: CPT | Mod: 59

## 2024-01-19 NOTE — ASSESSMENT
[FreeTextEntry1] : This is a 63 woman with a history of Polycythemia Vera, MAK 2 + since 2012. 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 She is now under my super vision for the P. Vera.  Jakafi was initially started at 15mg TIW. Dose was decreased to 10mg TIW due to anemia though that was likely form the lack of danazol. Since then dose was adjusted back to 15mg then to 20mg TIW.  Patient was taken off Jakafi with intent to switch to momelotinib (Ojjaara) 200mg daily in setting of dialysis.  A cough developed so we asked patient to hold off o on the start for this.  Patient ended up being admitted for progression of the abdominal pain from splenomegaly.   Patient was started on the Ojjaara after notification of the splenomegaly, has only been on this for 3-4 days.   Hg nazia to 9.8 fairly rapidly.  Suspect this was from a combination of halting the Jakafi as well as starting the Ojjaara.  Given the rapid rise, recommended patient stop the danazol such that we do not cause a hypercoagulable state with too high of a hg.   Can always restart the Danazole again if needed.   Splenomegaly still hurts, but patient has only been on the momelotib for 3-4 days. Would continue this, if the pain continues we can restart hydroxyurea.    Plan 1/17/23: -cbc: h/h 11.1/37.3. results discussed with patient. hgb has improved CMP, B12/Folate, Ferritin, Iron studies, Hapto, LDH, retic pending -restart hydroxyurea 500 mg 3x a week after dialysis -stop danazol. patient verbalized understanding.  -stop jakafi -continue ojjarra   RTC 3 months  Case and management discussed with MD Guo

## 2024-01-19 NOTE — HISTORY OF PRESENT ILLNESS
[de-identified] : 1/17/24: Urdu  Jennifer #[id number] used. Patient presents here today for a follow up. Continues to report difficulty in breathing for pat 2-3 months. Unable to take a deep breath, will cause her to cough. ct on 12/7 reports worsening of chronic interstitial lung dx. Patient was advised to follow up with pulmonology but has not done so.  Continues to reports intermittent left upper quad abd pain, not worsening. Recent CT Abd reports Increased splenomegaly, measuring up to 19.5 cm, previously 18.5 cm. Reports loose stools about 5-6 x a day for past 2 months. Denies any dark stool, blood in stool. Reports nausea, no vomiting for about 2 months. States symptoms started before starting before Ojjara. Was evaluated by GI and advised to start loperamide but has not done so. States she has been taking danazol since last follow up visit, even though she was advised to dc it. Will follow up with Gi on friday to discuss scheduling endoscopy/colonoscopy. Denies any fever/chills, night sweats, unintentional weight loss, bleeding symptoms.    12/28/23 :  Jia [id number]  Patient started the Ojarro //this past week during her coughing period. We had held off on starting it for aw [place]ak for the illness to clear. The cough went on for another 2 weeks forcing us to start despite the persistent cough.    Has only been on the Ojarro for 3 days alongside the danozole 600mg.  Given the imrpvoement very rapidly to Hg 9.8g/dl as of today, will discontinue the Danazole.    Patient returns for follow up after a hospital stay for severe anemia.  was admitted 11/26 and discharged 11/29 Patient coughing and congested for 3  weeks prior to the visit to the ED for the HG 6.4g/dl on admission. was transfused and Hg actually want down a little to 6.3 before a 2nd unit was transfused and patient discharged with a hG 7.4g/dl.  Ojjaro Momelotinb 200mg.  to use along side the Danazol 600mg and replace the Jakafi.   MAK 2 and ACtivin A inhibitor for MF patients with anemia.

## 2024-01-23 LAB
ALBUMIN SERPL ELPH-MCNC: 4.2 G/DL
ALP BLD-CCNC: 90 U/L
ALT SERPL-CCNC: 18 U/L
ANION GAP SERPL CALC-SCNC: 16 MMOL/L
AST SERPL-CCNC: 31 U/L
BILIRUB SERPL-MCNC: 3.6 MG/DL
BUN SERPL-MCNC: 31 MG/DL
CALCIUM SERPL-MCNC: 9.2 MG/DL
CHLORIDE SERPL-SCNC: 105 MMOL/L
CO2 SERPL-SCNC: 23 MMOL/L
CREAT SERPL-MCNC: 7.68 MG/DL
EGFR: 6 ML/MIN/1.73M2
FERRITIN SERPL-MCNC: 1379 NG/ML
FOLATE SERPL-MCNC: >20 NG/ML
GLUCOSE SERPL-MCNC: 140 MG/DL
HAPTOGLOB SERPL-MCNC: <20 MG/DL
IRON SATN MFR SERPL: 28 %
IRON SERPL-MCNC: 50 UG/DL
LDH SERPL-CCNC: 561 U/L
POTASSIUM SERPL-SCNC: 5.8 MMOL/L
PROT SERPL-MCNC: 7 G/DL
SODIUM SERPL-SCNC: 145 MMOL/L
TIBC SERPL-MCNC: 176 UG/DL
UIBC SERPL-MCNC: 126 UG/DL
VIT B12 SERPL-MCNC: >2000 PG/ML

## 2024-01-25 NOTE — PROGRESS NOTE ADULT - PROBLEM SELECTOR PLAN 1
Will dialyze today per MWF schedule via LUE fistula.     Will plan for HD today per her schedule. Last HD Tues 10/11.  Patient is euvolemic at this time. Mely

## 2024-02-01 ENCOUNTER — APPOINTMENT (OUTPATIENT)
Dept: ENDOVASCULAR SURGERY | Facility: CLINIC | Age: 64
End: 2024-02-01
Payer: MEDICAID

## 2024-02-01 ENCOUNTER — RESULT REVIEW (OUTPATIENT)
Age: 64
End: 2024-02-01

## 2024-02-01 VITALS
SYSTOLIC BLOOD PRESSURE: 135 MMHG | RESPIRATION RATE: 16 BRPM | HEART RATE: 82 BPM | HEIGHT: 62 IN | OXYGEN SATURATION: 98 % | BODY MASS INDEX: 21.5 KG/M2 | DIASTOLIC BLOOD PRESSURE: 78 MMHG | TEMPERATURE: 98 F | WEIGHT: 116.84 LBS

## 2024-02-01 PROCEDURE — 36902Z: CUSTOM

## 2024-02-01 PROCEDURE — 36907Z: CUSTOM | Mod: 59

## 2024-02-01 RX ORDER — HYDROXYUREA 500 MG/1
500 CAPSULE ORAL
Qty: 30 | Refills: 1 | Status: DISCONTINUED | COMMUNITY
End: 2024-02-01

## 2024-02-01 RX ORDER — MOMELOTINIB 200 MG/1
200 TABLET ORAL DAILY
Qty: 30 | Refills: 5 | Status: DISCONTINUED | COMMUNITY
Start: 2023-11-30 | End: 2024-02-01

## 2024-02-01 RX ORDER — FLUCONAZOLE 150 MG/1
TABLET ORAL
Refills: 0 | Status: DISCONTINUED | COMMUNITY
End: 2024-02-01

## 2024-02-01 RX ORDER — GABAPENTIN 100 MG/1
100 CAPSULE ORAL
Qty: 90 | Refills: 3 | Status: DISCONTINUED | COMMUNITY
Start: 2019-10-28 | End: 2024-02-01

## 2024-02-01 NOTE — REASON FOR VISIT
[Initial Visit] : an initial visit for [Back Pain] : back pain [Other ___] : a [unfilled] visit for [Spouse] : spouse [FreeTextEntry2] : central stenosis/3 month fistulogram f/u

## 2024-02-01 NOTE — HISTORY OF PRESENT ILLNESS
[] : left radiocephalic fistula [FreeTextEntry1] : Dr. Dempsey 9/18/17 [FreeTextEntry4] : Yesterday  [FreeTextEntry5] : Yesterday at  1000 pm [FreeTextEntry6] : Dr. Koroma

## 2024-02-01 NOTE — ASSESSMENT
[Other: _____] : [unfilled] [FreeTextEntry1] : ESRD on HD with prolonged bleeding following HD from LUE fistula for evaluation.  Consent obtained.  Anesthesia plan formulated and discussed with anesthesiologist.  Severe focal stenoses within central and axillary stent PTA to 10mm and 8mm with post images demostrating good result and no complication.  Full report to follow.  EBL= 10 cc. .

## 2024-02-07 ENCOUNTER — NON-APPOINTMENT (OUTPATIENT)
Age: 64
End: 2024-02-07

## 2024-02-18 PROBLEM — Z13.31 SCREENING FOR DEPRESSION: Status: ACTIVE | Noted: 2024-02-18

## 2024-02-18 PROBLEM — I77.0 AVF (ARTERIOVENOUS FISTULA): Status: ACTIVE | Noted: 2017-12-07

## 2024-02-18 PROBLEM — K76.6 PORTAL HYPERTENSION: Status: ACTIVE | Noted: 2021-01-26

## 2024-02-18 PROBLEM — Z13.39 SCREENING FOR ALCOHOLISM: Status: ACTIVE | Noted: 2024-02-18

## 2024-02-18 NOTE — PHYSICAL EXAM
[No Acute Distress] : no acute distress [Well Nourished] : well nourished [Well Developed] : well developed [Well-Appearing] : well-appearing [Normal Sclera/Conjunctiva] : normal sclera/conjunctiva [PERRL] : pupils equal round and reactive to light [EOMI] : extraocular movements intact [Normal Outer Ear/Nose] : the outer ears and nose were normal in appearance [Normal TMs] : both tympanic membranes were normal [No Lymphadenopathy] : no lymphadenopathy [Supple] : supple [Thyroid Normal, No Nodules] : the thyroid was normal and there were no nodules present [No Respiratory Distress] : no respiratory distress  [No Accessory Muscle Use] : no accessory muscle use [Clear to Auscultation] : lungs were clear to auscultation bilaterally [Normal Rate] : normal rate  [Regular Rhythm] : with a regular rhythm [Normal S1, S2] : normal S1 and S2 [No Murmur] : no murmur heard [Pedal Pulses Present] : the pedal pulses are present [No Edema] : there was no peripheral edema [Soft] : abdomen soft [Non Tender] : non-tender [Non-distended] : non-distended [Normal Bowel Sounds] : normal bowel sounds [No CVA Tenderness] : no CVA  tenderness [No Spinal Tenderness] : no spinal tenderness [Normal Gait] : normal gait [Normal Affect] : the affect was normal [Normal Insight/Judgement] : insight and judgment were intact [de-identified] : Left UE AVF intact with good bruit and thrill

## 2024-02-18 NOTE — HISTORY OF PRESENT ILLNESS
[de-identified] : 62 yo Green[place] woman with a history of Polycythemia Vera, MAK 2 + since 2012 c/bhistory of splenic vein thrombosis w/ gastric varices (2015), ESRD on HD (Tu/Th/Sat) via LUE AVF (2/2 chronic GN, started Dec 2017) formerly on peritoneal dialysis, HTN, acute cholecystitis s/p ERCP c/b pancreatitis & cholecystectomy who presents for ??  BP at the HD was very high 190s then decreased. today 160/85. To go HD this afternoon.  Yesterday HD did lab: Hemoglobin 11.1. Left upper arm AVF is good with thrill. Been using the fistula for 7 years. Will see Endovascular surgery 2/1/24 for ruoutine f.u. She had difficulty to make GI appointment for urgent EGD/colonoscopy. Will send task to Dr. Rivera.  Sent task.  Seen by GI last week and could not get earlier appointment for EGD/colonoscopy. Severe nausea for over a month,  stress stool incontinence, lost appetite, chronic diarrhea, hx of gastric varix( 2015), cirrhosis increased concerns. She goes to HD( M/W/F afternoon schedule) and limited options for GI procedure. Please see her soon. Thank you.  [FreeTextEntry1] : f/u

## 2024-02-18 NOTE — HISTORY OF PRESENT ILLNESS
[FreeTextEntry1] : CPE [de-identified] : 62 yo Chinese woman with a history of Polycythemia Vera, MAK 2 + since 2012 c/bhistory of splenic vein thrombosis w/ gastric varices (2015), ESRD on HD (Tu/Th/Sat) via LUE AVF (2/2 chronic GN, started Dec 2017) formerly on peritoneal dialysis, HTN, acute cholecystitis s/p ERCP c/b pancreatitis & cholecystectomy who presents for CPE. Last seen by our clinic 12/29/23.  She came with  and wishes to share information. She reports stable conditions f/u HD regularly. Recently her BP at the HD noted very high 190s then decreased. today 160/85. To go HD this afternoon. Yesterday HD did lab: Hemoglobin 11.1. Left upper arm AVF is good with thrill. Been using the fistula for 7 years. Will see Endovascular surgery 2/1/24 for routine f/u.  Seen by GI clinic last week and could not get earlier appointment for EGD/colonoscopy. Pt endorses severe nausea for over a month,  stress stool incontinence, lost appetite, chronic diarrhea with hx of gastric varix( 2015), cirrhosis. She goes to HD( M/W/F afternoon schedule) and limited options for GI procedure. She had difficulty to make GI appointment for urgent EGD/colonoscopy. Will send task to Dr. Rivera. Denied fever, chills,CP, SOB, abdominal pain.

## 2024-02-18 NOTE — INTERPRETER SERVICES
[[employer] ] : provided by [employer]   [Interpreters_IDNumber] : [id number] [Interpreters_FullName] : Johana [Patient Declined  Services] : - None: Patient declined  services [Interpreters_Relationshiptopatient] : the provider speaks Icelandic [TWNoteComboBox1] : Chinese

## 2024-02-18 NOTE — HEALTH RISK ASSESSMENT
[Intercurrent hospitalizations] : was admitted to the hospital  [No] : In the past 12 months have you used drugs other than those required for medical reasons? No [No falls in past year] : Patient reported no falls in the past year [PHQ-2 Negative - No further assessment needed] : PHQ-2 Negative - No further assessment needed [Several Days (1)] : 4.) Feeling tired or having little energy? Several days [Not at All (0)] : 8.) Moving or speaking so slowly that other people could have noticed, or the opposite, moving or speaking faster than usual? Not at all [Not at all] : How difficult have these problems made it for you to do your work, take care of things at home, or get along with people? Not at all [PHQ-9 Negative - No further assessment needed] : PHQ-9 Negative - No further assessment needed [Patient reported mammogram was normal] : Patient reported mammogram was normal [None] : None [With Family] : lives with family [# of Members in Household ___] :  household currently consist of [unfilled] member(s) [] :  [Feels Safe at Home] : Feels safe at home [Fully functional (bathing, dressing, toileting, transferring, walking, feeding)] : Fully functional (bathing, dressing, toileting, transferring, walking, feeding) [Fully functional (using the telephone, shopping, preparing meals, housekeeping, doing laundry, using] : Fully functional and needs no help or supervision to perform IADLs (using the telephone, shopping, preparing meals, housekeeping, doing laundry, using transportation, managing medications and managing finances) [With Patient/Caregiver] : , with patient/caregiver [Designated Healthcare Proxy] : Designated healthcare proxy [I will adhere to the patient's wishes.] : I will adhere to the patient's wishes. [Time Spent: ___ minutes] : Time Spent: [unfilled] minutes [Good] : ~his/her~  mood as  good [0] : 2) Feeling down, depressed, or hopeless: Not at all (0) [de-identified] : GI, hematology, nephrology [de-identified] : As above [de-identified] : As above [PCQ6Msryd] : 0 [TQS5YtsuyEntev] : 2 [Change in mental status noted] : No change in mental status noted [On disability] : on disability [Reports changes in hearing] : Reports no changes in hearing [Reports changes in vision] : Reports no changes in vision [Reports changes in dental health] : Reports no changes in dental health [MammogramDate] : 06/19 [MammogramComments] : BI-RADS 1 [HepatitisCDate] : 12/17 [HepatitisCComments] : Negative [AdvancecareDate] : 01/24 [Never] : Never

## 2024-02-18 NOTE — ASSESSMENT
[FreeTextEntry1] : 63 woman with a history of Polycythemia Vera, MAK 2 + since 2012 c/b history of splenic vein thrombosis w/ gastric varices (2015), ESRD on HD (Tu/Th/Sat) via LUE AVF (2/2 chronic GN, started Dec 2017) formerly on peritoneal dialysis, HTN, acute cholecystitis s/p ERCP c/b pancreatitis & cholecystectomy who presents for CPE.  # HCM -COVID shot: got initial 2 doses. -Flu shot: holding it due to cold like sx. Will get once condition returns baseline. -PHQ2, SBIRT: negative.  -mammogram: last one 6/7/19 Birads 1, Rx given today.   # ESRD on HD MWF afternoon - Continue nephrology f/u as recommended - will see vascular 2/1/24.  # H/o acute cholecystitis s/p cholecystectomy, Splenic vein thrombosis w/ gastric varices - still reporting GI sx and needs EGD/ colonoscopy.  Recently seen by GI but could not make GI procedure appointment. Sent task to GI for appointment which does not conflict with her HD schedule.  - Tolerating diet, minimal RUQ pain - continue prn use of simethicone, loperamide, ondansetron.  # Fatigue - Fatigue likely multifactorial i/s/o insomnia, myelofibrosis, depression - PHQ-2 0, patient not interested in counseling services - Myelofibrosis management as per below - Discussed sleep hygiene  # HTN - BP  at target as 130/70 f/u HD, nephrology  Low salt diet, DASH diet.   # Myelofibrosis - Follow up with hematology as recommended - Continue Ojjaara 200mg po qd, Danazol 200mg po tid , Gabapentin 100mg qhs by Hematology   - RTC with our clinic 3 months or prn -LAB work is managed by HD, nephrology.

## 2024-02-18 NOTE — REVIEW OF SYSTEMS
[Hot Flashes] : hot flashes [Vision Problems] : vision problems [Negative] : Constitutional [Discharge] : no discharge [Pain] : no pain [Hearing Loss] : no hearing loss [Chest Pain] : no chest pain [Shortness Of Breath] : no shortness of breath [Wheezing] : no wheezing [Cough] : no cough [Abdominal Pain] : no abdominal pain [Constipation] : no constipation [Diarrhea] : diarrhea [Dysuria] : no dysuria [Frequency] : no frequency [Joint Pain] : no joint pain [Joint Stiffness] : no joint stiffness [Muscle Pain] : no muscle pain [Skin Rash] : no skin rash [Headache] : no headache [Dizziness] : no dizziness [Fainting] : no fainting

## 2024-02-26 ENCOUNTER — EMERGENCY (EMERGENCY)
Facility: HOSPITAL | Age: 64
LOS: 1 days | Discharge: HOME | End: 2024-02-26
Attending: EMERGENCY MEDICINE | Admitting: HOSPITALIST
Payer: MEDICAID

## 2024-02-26 VITALS
SYSTOLIC BLOOD PRESSURE: 130 MMHG | HEART RATE: 97 BPM | RESPIRATION RATE: 18 BRPM | TEMPERATURE: 98 F | DIASTOLIC BLOOD PRESSURE: 81 MMHG | WEIGHT: 118.17 LBS | HEIGHT: 62 IN | OXYGEN SATURATION: 95 %

## 2024-02-26 DIAGNOSIS — I77.0 ARTERIOVENOUS FISTULA, ACQUIRED: Chronic | ICD-10-CM

## 2024-02-26 DIAGNOSIS — T85.898A OTHER SPECIFIED COMPLICATION OF OTHER INTERNAL PROSTHETIC DEVICES, IMPLANTS AND GRAFTS, INITIAL ENCOUNTER: Chronic | ICD-10-CM

## 2024-02-26 DIAGNOSIS — Z99.2 DEPENDENCE ON RENAL DIALYSIS: Chronic | ICD-10-CM

## 2024-02-26 LAB
ALBUMIN SERPL ELPH-MCNC: 4 G/DL — SIGNIFICANT CHANGE UP (ref 3.3–5)
ALP SERPL-CCNC: 100 U/L — SIGNIFICANT CHANGE UP (ref 40–120)
ALT FLD-CCNC: 19 U/L — SIGNIFICANT CHANGE UP (ref 10–45)
ANION GAP SERPL CALC-SCNC: 12 MMOL/L — SIGNIFICANT CHANGE UP (ref 5–17)
ANISOCYTOSIS BLD QL: SLIGHT — SIGNIFICANT CHANGE UP
AST SERPL-CCNC: 29 U/L — SIGNIFICANT CHANGE UP (ref 10–40)
BASOPHILS # BLD AUTO: 0.13 K/UL — SIGNIFICANT CHANGE UP (ref 0–0.2)
BASOPHILS NFR BLD AUTO: 1.8 % — SIGNIFICANT CHANGE UP (ref 0–2)
BILIRUB DIRECT SERPL-MCNC: 0.4 MG/DL — HIGH (ref 0–0.3)
BILIRUB INDIRECT FLD-MCNC: 3.5 MG/DL — HIGH (ref 0.2–1)
BILIRUB SERPL-MCNC: 3.9 MG/DL — HIGH (ref 0.2–1.2)
BILIRUB SERPL-MCNC: 3.9 MG/DL — HIGH (ref 0.2–1.2)
BUN SERPL-MCNC: 13 MG/DL — SIGNIFICANT CHANGE UP (ref 7–23)
CALCIUM SERPL-MCNC: 9.1 MG/DL — SIGNIFICANT CHANGE UP (ref 8.4–10.5)
CHLORIDE SERPL-SCNC: 92 MMOL/L — LOW (ref 96–108)
CO2 SERPL-SCNC: 29 MMOL/L — SIGNIFICANT CHANGE UP (ref 22–31)
CREAT SERPL-MCNC: 3.18 MG/DL — HIGH (ref 0.5–1.3)
DACRYOCYTES BLD QL SMEAR: SLIGHT — SIGNIFICANT CHANGE UP
EGFR: 16 ML/MIN/1.73M2 — LOW
EOSINOPHIL # BLD AUTO: 0 K/UL — SIGNIFICANT CHANGE UP (ref 0–0.5)
EOSINOPHIL NFR BLD AUTO: 0 % — SIGNIFICANT CHANGE UP (ref 0–6)
GLUCOSE SERPL-MCNC: 101 MG/DL — HIGH (ref 70–99)
HCT VFR BLD CALC: 34.1 % — LOW (ref 34.5–45)
HGB BLD-MCNC: 10.5 G/DL — LOW (ref 11.5–15.5)
LYMPHOCYTES # BLD AUTO: 0.65 K/UL — LOW (ref 1–3.3)
LYMPHOCYTES # BLD AUTO: 9.1 % — LOW (ref 13–44)
MACROCYTES BLD QL: SLIGHT — SIGNIFICANT CHANGE UP
MANUAL SMEAR VERIFICATION: SIGNIFICANT CHANGE UP
MCHC RBC-ENTMCNC: 29.6 PG — SIGNIFICANT CHANGE UP (ref 27–34)
MCHC RBC-ENTMCNC: 30.8 GM/DL — LOW (ref 32–36)
MCV RBC AUTO: 96.1 FL — SIGNIFICANT CHANGE UP (ref 80–100)
MONOCYTES # BLD AUTO: 0 K/UL — SIGNIFICANT CHANGE UP (ref 0–0.9)
MONOCYTES NFR BLD AUTO: 0 % — LOW (ref 2–14)
MYELOCYTES NFR BLD: 0.9 % — HIGH (ref 0–0)
NEUTROPHILS # BLD AUTO: 6.31 K/UL — SIGNIFICANT CHANGE UP (ref 1.8–7.4)
NEUTROPHILS NFR BLD AUTO: 87.3 % — HIGH (ref 43–77)
NEUTS BAND # BLD: 0.9 % — SIGNIFICANT CHANGE UP (ref 0–8)
NRBC # BLD: 2 /100 WBCS — HIGH (ref 0–0)
OVALOCYTES BLD QL SMEAR: SLIGHT — SIGNIFICANT CHANGE UP
PLAT MORPH BLD: ABNORMAL
PLATELET # BLD AUTO: 111 K/UL — LOW (ref 150–400)
POIKILOCYTOSIS BLD QL AUTO: SLIGHT — SIGNIFICANT CHANGE UP
POLYCHROMASIA BLD QL SMEAR: SLIGHT — SIGNIFICANT CHANGE UP
POTASSIUM SERPL-MCNC: 3.4 MMOL/L — LOW (ref 3.5–5.3)
POTASSIUM SERPL-SCNC: 3.4 MMOL/L — LOW (ref 3.5–5.3)
PROT SERPL-MCNC: 7.3 G/DL — SIGNIFICANT CHANGE UP (ref 6–8.3)
RBC # BLD: 3.55 M/UL — LOW (ref 3.8–5.2)
RBC # FLD: 20.8 % — HIGH (ref 10.3–14.5)
RBC BLD AUTO: ABNORMAL
SODIUM SERPL-SCNC: 133 MMOL/L — LOW (ref 135–145)
WBC # BLD: 7.15 K/UL — SIGNIFICANT CHANGE UP (ref 3.8–10.5)
WBC # FLD AUTO: 7.15 K/UL — SIGNIFICANT CHANGE UP (ref 3.8–10.5)

## 2024-02-26 RX ORDER — ONDANSETRON 8 MG/1
4 TABLET, FILM COATED ORAL ONCE
Refills: 0 | Status: COMPLETED | OUTPATIENT
Start: 2024-02-26 | End: 2024-02-26

## 2024-02-26 RX ADMIN — ONDANSETRON 4 MILLIGRAM(S): 8 TABLET, FILM COATED ORAL at 22:22

## 2024-02-26 NOTE — ED ADULT NURSE NOTE - NS_SISCREENINGSR_GEN_ALL_ED
Negative
PAST SURGICAL HISTORY:  History of enlarged tonsils     History of knee replacement 2016 and 2017

## 2024-02-26 NOTE — ED ADULT NURSE NOTE - OBJECTIVE STATEMENT
63 y.o F AAO4 ambulatory presents to the ED from dialysis center after completing her session and getting lab results back that were elevated. Pt doctor told her to go to the emergency room. Pt indirect bilirubin was elevated, pt is jaundice, and her abdomen is a little tender to palpation. Pt states shes been a little nausea as well. Pt PMH of HTN, ESRD, anemia gets transfused often. Pt has Left AV Fistula. Pt gets dialysis M,W,F. Pt denies CP, SOB, HA, vision changes, /v/d, fevers chills. Upon assessment, abdomen soft and nontender, +strong peripheral pulses, moving all extremities without difficulty. Pt [place] accessed Provider to RN in xray checked for placement, CT compactable.

## 2024-02-26 NOTE — ED PROVIDER NOTE - PHYSICAL EXAMINATION
General: alert, oriented to person, time, place  Psych: mood appropriate  Head: normocephalic; atraumatic  Eyes: scleral icterus  ENT: no nasal flaring, patent nares  Cardio: non-tachycardic; skin warm and well perfused  Resp: normal respiratory effort; no accessory muscle use  GI: abdomen soft, nontender, nondistended  Neuro: normal sensation, moving all four extremities equally  Skin: jaundiced  MSK: normal movement of all extremities  Lymph/Vasc: no LE edema

## 2024-02-26 NOTE — ED ADULT NURSE NOTE - NSFALLUNIVINTERV_ED_ALL_ED
Bed/Stretcher in lowest position, wheels locked, appropriate side rails in place/Call bell, personal items and telephone in reach/Instruct patient to call for assistance before getting out of bed/chair/stretcher/Non-slip footwear applied when patient is off stretcher/[place] to call system/Physically safe environment - no spills, clutter or unnecessary equipment/Purposeful proactive rounding/Room/bathroom lighting operational, light cord in reach

## 2024-02-26 NOTE — ED PROVIDER NOTE - DATE/TIME 1
Subjective   56-year-old female presents with nausea, vomiting.  Patient states that 2 days ago she had few episodes of loose stools but otherwise felt okay, yesterday morning woke up with nausea and vomiting and reports she has vomited approximately 30 times in the past 24 hours.  She describes diffuse abdominal cramping associated with her vomiting and dry heaving.  No further loose stools today.  No fevers or chills.  No urinary symptoms.  No cough, congestion, chest pain, shortness of breath.  Patient states she has not had her Lantus since last Friday and any of her other meds since last Saturday.  Reports she was out of town and forgot to bring them with her.  Reports she has not tolerated any p.o. intake including her Phenergan over the past 24 hours.          Review of Systems   Constitutional: Positive for appetite change and fatigue. Negative for chills and fever.   HENT: Negative.    Eyes: Negative.    Respiratory: Negative.    Cardiovascular: Negative.    Gastrointestinal:        Per HPI, otherwise negative   Endocrine:        Per HPI   Genitourinary: Negative.    Musculoskeletal: Negative.    Skin: Negative.    Neurological: Negative.    Psychiatric/Behavioral: Negative.        Past Medical History:   Diagnosis Date   • Abnormal Pap smear of cervix     1 abnormal pap with normal f/u   • Howell esophagus    • Colon polyp    • Diabetes mellitus (CMS/HCC)    • Difficulty swallowing     at times   • Fibroid    • GERD (gastroesophageal reflux disease)    • HSV-2 (herpes simplex virus 2) infection 2017   • Menstrual disorder    • Migraines    • Rectal bleeding     bleeds with bowel movement   • Seasonal allergies        Allergies   Allergen Reactions   • Mobic [Meloxicam]      Blurred vision       Past Surgical History:   Procedure Laterality Date   • CARPAL TUNNEL RELEASE WITH CUBITAL TUNNEL RELEASE Right    •  SECTION      X2   • COLONOSCOPY N/A 2016    Procedure: COLONOSCOPY with  "polypectomy;  Surgeon: Maryan Kent MD;  Location: [facility] OR;  Service:    • COLONOSCOPY N/A 6/3/2020    Procedure: COLONOSCOPY;  Surgeon: Dmitri Fung MD;  Location: [facility] OR;  Service: Gastroenterology;  Laterality: N/A;  Poor prep, polyp   • ENDOMETRIAL ABLATION      thermachoice   • ENDOSCOPY N/A 6/3/2020    Procedure: ESOPHAGOGASTRODUODENOSCOPY;  Surgeon: Dmitri Fung MD;  Location: [facility] OR;  Service: Gastroenterology;  Laterality: N/A;  Reflux esophagitis, erosive gastritis, duodenitis   • HYSTEROSCOPY      AND ENDOMETRIAL  ABLATION   • NASAL SEPTUM SURGERY         • TUBAL ABDOMINAL LIGATION         Family History   Problem Relation Age of Onset   • Hypertension Mother    • Hyperlipidemia Mother    • Atrial fibrillation Mother    • Stroke Mother    • Heart attack Father    • Hypertension Father    • Diabetes Father    • Alcohol abuse Father    • Dementia Father    • Stroke Sister    • Crohn's disease Sister    • Heart attack Brother    • Hypertension Brother    • Stroke Brother    • Esophageal cancer Brother    • Alzheimer's disease Paternal Grandmother    • Diabetes Paternal Aunt    • Breast cancer Paternal Aunt    • Rheum arthritis Other    • Colon cancer Maternal Uncle    • Ovarian cancer Neg Hx    • Deep vein thrombosis Neg Hx    • Pulmonary embolism Neg Hx        Social History     Socioeconomic History   • Marital status:      Spouse name: Not on file   • Number of children: Not on file   • Years of education: Not on file   • Highest education level: Not on file   Tobacco Use   • Smoking status: Former Smoker     Quit date: 1995     Years since quittin.8   • Smokeless tobacco: Never Used   • Tobacco comment: Quit \" a long time ago\"    Substance and Sexual Activity   • Alcohol use: Yes     Comment: OCCASIONALLY   • Drug use: No   • Sexual activity: Yes     Partners: Male     Birth control/protection: Surgical           Objective   Physical " Exam  Constitutional:       General: She is not in acute distress.     Appearance: She is ill-appearing.   HENT:      Head: Normocephalic and atraumatic.      Nose: Nose normal. No congestion.      Mouth/Throat:      Mouth: Mucous membranes are moist.      Pharynx: Oropharynx is clear.   Eyes:      Extraocular Movements: Extraocular movements intact.      Pupils: Pupils are equal, round, and reactive to light.   Cardiovascular:      Pulses: Normal pulses.      Heart sounds: Normal heart sounds.      Comments: Tachycardic to 120s, regular, sinus on monitor  Pulmonary:      Effort: Pulmonary effort is normal. No respiratory distress.      Breath sounds: Normal breath sounds.   Abdominal:      General: There is no distension.      Palpations: Abdomen is soft.      Tenderness: There is abdominal tenderness (diffuse but worse in LLQ). There is no right CVA tenderness, left CVA tenderness, guarding or rebound.   Musculoskeletal:         General: No tenderness or deformity.   Skin:     General: Skin is warm and dry.      Capillary Refill: Capillary refill takes less than 2 seconds.   Neurological:      General: No focal deficit present.      Mental Status: She is alert and oriented to person, place, and time. Mental status is at baseline.   Psychiatric:         Mood and Affect: Mood normal.         Behavior: Behavior normal.         Thought Content: Thought content normal.         Judgment: Judgment normal.         Procedures           ED Course  ED Course as of Apr 14 0547   Wed Apr 14, 2021   0435 Patient ill-appearing, tachycardic, stable blood pressures and in no acute distress.  Suspect DKA which was confirmed with initial lab work.  IV fluids initiated.  Will initiate insulin protocol once remainder of lab work returns.  Patient does have a significantly elevated white blood cell count, some abdominal tenderness, will obtain CT scan of abdomen as well.  We will also initiate broad-spectrum antibiotics to cover for  sepsis.    [TD]   0546 3+ bacteria in urine but no white blood cells.  CT of abdomen and pelvis obtained, no contrast due to CLINT, with no acute findings.  Chest x-ray negative.  Covid negative.    [TD]      ED Course User Index  [TD] Raffy Villela MD                                           [facility]    Final diagnoses:   Diabetic ketoacidosis without coma associated with other specified diabetes mellitus (CMS/HCC)   Nausea and vomiting, intractability of vomiting not specified, unspecified vomiting type   Abdominal pain, unspecified abdominal location   Noncompliance with medication regimen   Acute kidney injury (CMS/HCC)       ED Disposition  ED Disposition     ED Disposition Condition Comment    Decision to Admit  Level of Care: Critical Care [6]   Diagnosis: Diabetic ketoacidosis without coma associated with other specified diabetes mellitus (CMS/HCC) [[id number]]   Admitting Physician: YING PROCTOR [[id number]]   Attending Physician: YING PROCTOR [[id number]]            No follow-up provider specified.       Medication List      No changes were made to your prescriptions during this visit.          Raffy Villela MD  04/14/21 0517     27-Feb-2024 04:26

## 2024-02-26 NOTE — ED PROVIDER NOTE - ATTENDING CONTRIBUTION TO CARE
Attending note (Cullen): 63-year-old female with history of ESRD on HD (right chest wall port) who was sent from hemodialysis today (after completion of full session) for elevated bilirubin (per review of labs largely indirect bilirubinemia).  Patient noted to have mild scleral icterus and slight jaundice.  No abdominal tenderness.  Hemodynamically stable.  Afebrile.  Painless jaundice though indirect with likely obstructive will evaluate for evidence of anemia/hemolysis that seems less likely.  Will also check additional screening labs CBC CMP Any test direct/indirect bilirubin.  Can consider abdominal imaging pending review of labs and consider hematology consultation.  Disposition pending review of labs, see progress notes above for updates ED clinical course and medical decision making.

## 2024-02-26 NOTE — ED PROVIDER NOTE - OBJECTIVE STATEMENT
63F past medical history noncirrhotic gastric varices, hypertension, polycythemia vera and secondary myelofibrosis, splenomegaly, ESRD on HD M/W/F, last HD today, sent in from dialysis clinic due to elevated indirect bilirubin. Patient endorses chronic nausea, no recent fevers, no new rashes.

## 2024-02-26 NOTE — ED PROVIDER NOTE - WR ORDER ID 1
[id number] No Repair - Repaired With Adjacent Surgical Defect Text (Leave Blank If You Do Not Want): After obtaining clear surgical margins the defect was repaired concurrently with another surgical defect which was in close approximation.

## 2024-02-26 NOTE — ED PROVIDER NOTE - PROGRESS NOTE DETAILS
Gunnar Bass MD: Case discussed with hematology. Patient has history of splenomegaly. They think that with elevated LDH, would be reasonable to do inpatient assessment.

## 2024-02-27 DIAGNOSIS — K76.6 PORTAL HYPERTENSION: ICD-10-CM

## 2024-02-27 DIAGNOSIS — E80.6 OTHER DISORDERS OF BILIRUBIN METABOLISM: ICD-10-CM

## 2024-02-27 DIAGNOSIS — Z29.9 ENCOUNTER FOR PROPHYLACTIC MEASURES, UNSPECIFIED: ICD-10-CM

## 2024-02-27 DIAGNOSIS — N18.6 END STAGE RENAL DISEASE: ICD-10-CM

## 2024-02-27 DIAGNOSIS — K62.9 DISEASE OF ANUS AND RECTUM, UNSPECIFIED: ICD-10-CM

## 2024-02-27 DIAGNOSIS — L29.9 PRURITUS, UNSPECIFIED: ICD-10-CM

## 2024-02-27 LAB
ADD ON TEST-SPECIMEN IN LAB: SIGNIFICANT CHANGE UP
ALBUMIN SERPL ELPH-MCNC: 3.8 G/DL — SIGNIFICANT CHANGE UP (ref 3.3–5)
ALP SERPL-CCNC: 92 U/L — SIGNIFICANT CHANGE UP (ref 40–120)
ALT FLD-CCNC: 18 U/L — SIGNIFICANT CHANGE UP (ref 10–45)
ANION GAP SERPL CALC-SCNC: 13 MMOL/L — SIGNIFICANT CHANGE UP (ref 5–17)
AST SERPL-CCNC: 35 U/L — SIGNIFICANT CHANGE UP (ref 10–40)
BILIRUB DIRECT SERPL-MCNC: 0.4 MG/DL — HIGH (ref 0–0.3)
BILIRUB INDIRECT FLD-MCNC: 3.6 MG/DL — HIGH (ref 0.2–1)
BILIRUB SERPL-MCNC: 4 MG/DL — HIGH (ref 0.2–1.2)
BILIRUB SERPL-MCNC: 4 MG/DL — HIGH (ref 0.2–1.2)
BUN SERPL-MCNC: 19 MG/DL — SIGNIFICANT CHANGE UP (ref 7–23)
CALCIUM SERPL-MCNC: 8.9 MG/DL — SIGNIFICANT CHANGE UP (ref 8.4–10.5)
CHLORIDE SERPL-SCNC: 94 MMOL/L — LOW (ref 96–108)
CO2 SERPL-SCNC: 27 MMOL/L — SIGNIFICANT CHANGE UP (ref 22–31)
CREAT SERPL-MCNC: 4.48 MG/DL — HIGH (ref 0.5–1.3)
DAT POLY-SP REAG RBC QL: NEGATIVE — SIGNIFICANT CHANGE UP
EGFR: 11 ML/MIN/1.73M2 — LOW
GLUCOSE SERPL-MCNC: 101 MG/DL — HIGH (ref 70–99)
HCT VFR BLD CALC: 34.6 % — SIGNIFICANT CHANGE UP (ref 34.5–45)
HGB BLD-MCNC: 10.4 G/DL — LOW (ref 11.5–15.5)
MAGNESIUM SERPL-MCNC: 2.2 MG/DL — SIGNIFICANT CHANGE UP (ref 1.6–2.6)
MCHC RBC-ENTMCNC: 29.9 PG — SIGNIFICANT CHANGE UP (ref 27–34)
MCHC RBC-ENTMCNC: 30.1 GM/DL — LOW (ref 32–36)
MCV RBC AUTO: 99.4 FL — SIGNIFICANT CHANGE UP (ref 80–100)
NRBC # BLD: 0 /100 WBCS — SIGNIFICANT CHANGE UP (ref 0–0)
PHOSPHATE SERPL-MCNC: 5.3 MG/DL — HIGH (ref 2.5–4.5)
PLATELET # BLD AUTO: 110 K/UL — LOW (ref 150–400)
POTASSIUM SERPL-MCNC: 4.5 MMOL/L — SIGNIFICANT CHANGE UP (ref 3.5–5.3)
POTASSIUM SERPL-SCNC: 4.5 MMOL/L — SIGNIFICANT CHANGE UP (ref 3.5–5.3)
PROT SERPL-MCNC: 7 G/DL — SIGNIFICANT CHANGE UP (ref 6–8.3)
RBC # BLD: 3.48 M/UL — LOW (ref 3.8–5.2)
RBC # BLD: 3.48 M/UL — LOW (ref 3.8–5.2)
RBC # FLD: 21 % — HIGH (ref 10.3–14.5)
RETICS #: 185.5 K/UL — HIGH (ref 25–125)
RETICS/RBC NFR: 5.3 % — HIGH (ref 0.5–2.5)
SODIUM SERPL-SCNC: 134 MMOL/L — LOW (ref 135–145)
WBC # BLD: 6.39 K/UL — SIGNIFICANT CHANGE UP (ref 3.8–10.5)
WBC # FLD AUTO: 6.39 K/UL — SIGNIFICANT CHANGE UP (ref 3.8–10.5)

## 2024-02-27 RX ORDER — DANAZOL 200 MG/1
1 CAPSULE ORAL
Refills: 0 | DISCHARGE

## 2024-02-27 RX ORDER — DIPHENHYDRAMINE HCL 50 MG
25 CAPSULE ORAL ONCE
Refills: 0 | Status: COMPLETED | OUTPATIENT
Start: 2024-02-27 | End: 2024-02-27

## 2024-02-27 RX ORDER — FAMOTIDINE 10 MG/ML
20 INJECTION INTRAVENOUS DAILY
Refills: 0 | Status: DISCONTINUED | OUTPATIENT
Start: 2024-02-27 | End: 2024-02-28

## 2024-02-27 RX ORDER — GABAPENTIN 400 MG/1
1 CAPSULE ORAL
Refills: 0 | DISCHARGE

## 2024-02-27 RX ORDER — INFLUENZA VIRUS VACCINE 15; 15; 15; 15 UG/.5ML; UG/.5ML; UG/.5ML; UG/.5ML
0.5 SUSPENSION INTRAMUSCULAR ONCE
Refills: 0 | Status: DISCONTINUED | OUTPATIENT
Start: 2024-02-27 | End: 2024-02-28

## 2024-02-27 RX ORDER — CARVEDILOL PHOSPHATE 80 MG/1
3.12 CAPSULE, EXTENDED RELEASE ORAL EVERY 12 HOURS
Refills: 0 | Status: DISCONTINUED | OUTPATIENT
Start: 2024-02-27 | End: 2024-02-28

## 2024-02-27 RX ORDER — RUXOLITINIB 25 MG/1
1 TABLET ORAL
Refills: 0 | DISCHARGE

## 2024-02-27 RX ORDER — MOMELOTINIB 150 MG/1
1 TABLET ORAL
Refills: 0 | DISCHARGE

## 2024-02-27 RX ORDER — SODIUM CHLORIDE 9 MG/ML
100 INJECTION INTRAMUSCULAR; INTRAVENOUS; SUBCUTANEOUS
Refills: 0 | Status: DISCONTINUED | OUTPATIENT
Start: 2024-02-27 | End: 2024-02-28

## 2024-02-27 RX ORDER — CHLORHEXIDINE GLUCONATE 213 G/1000ML
1 SOLUTION TOPICAL DAILY
Refills: 0 | Status: DISCONTINUED | OUTPATIENT
Start: 2024-02-27 | End: 2024-02-28

## 2024-02-27 RX ORDER — URSODIOL 250 MG/1
300 TABLET, FILM COATED ORAL
Refills: 0 | Status: DISCONTINUED | OUTPATIENT
Start: 2024-02-27 | End: 2024-02-28

## 2024-02-27 RX ORDER — CALCIUM ACETATE 667 MG
667 TABLET ORAL
Refills: 0 | Status: DISCONTINUED | OUTPATIENT
Start: 2024-02-27 | End: 2024-02-28

## 2024-02-27 RX ADMIN — Medication 1 TABLET(S): at 18:25

## 2024-02-27 RX ADMIN — FAMOTIDINE 20 MILLIGRAM(S): 10 INJECTION INTRAVENOUS at 18:25

## 2024-02-27 RX ADMIN — URSODIOL 300 MILLIGRAM(S): 250 TABLET, FILM COATED ORAL at 18:25

## 2024-02-27 RX ADMIN — Medication 25 MILLIGRAM(S): at 01:22

## 2024-02-27 RX ADMIN — CARVEDILOL PHOSPHATE 3.12 MILLIGRAM(S): 80 CAPSULE, EXTENDED RELEASE ORAL at 18:25

## 2024-02-27 NOTE — H&P ADULT - ASSESSMENT
Unconjugated bilirubinemia differential:  Increased bilirubin production -> hemolysis, bleeding, hematoma, transfusions  Impaired bilirubin uptake -> normally drug induced, poor hepatic perfusion (CHF, portosystemic shunts/thrombosis)  Impaired bilirubin conjugation -> Gilbert, Criggler najjar, certain drugs    Plan:  -hemolysis labs (LDH, hapto), abdominal US with doppler     Unconjugated bilirubinemia differential:  Increased bilirubin production -> hemolysis, bleeding, hematoma, transfusions  Impaired bilirubin uptake -> normally drug induced, poor hepatic perfusion (CHF, portosystemic shunts/thrombosis)  Impaired bilirubin conjugation -> Gilbert, Criggler najjar, certain drugs    Plan:  -hemolysis labs (LDH, hapto, retic), abdominal US with doppler 63F with myelofibrosis, polycythemia vera, ESRD, portal HTN w/ gastric varices, admitted for elevated indirect bilirubin.

## 2024-02-27 NOTE — CONSULT NOTE ADULT - ASSESSMENT
63F with PMH of noncirrhotic gastric varices, hypertension, polycythemia vera and secondary myelofibrosis, splenomegaly, ESRD on HD M/W/F, sent in from dialysis clinic due to elevated indirect bilirubin. Hematology consulted for polycythemia vera management.    #Polycythemia Vera  - Follows Dr. Jacky Guo at [facility]  - Hematologic history: Jakafi was initially started at 15mg TIW. Dose was decreased to 10mg TIW due to anemia though that was likely form the lack of danazol. Since then dose was adjusted back to 15mg then to 20mg TIW. Patient was taken off Jakafi with intent to switch to momelotinib (Ojjaara) 200mg daily in setting of dialysis.   Patient was started on the Ojjaara after notification of the splenomegaly, and Hg nazia to 9.8 fairly rapidly. Suspect this was from a combination of halting the Jakafi as well as starting the Ojjaara. Given the rapid rise, recommended patient stop the danazol such that we do not cause a hypercoagulable state with too high of a hg.  -Painful splenomegaly after a few days of starting Ojjaara so restarted hydroxyurea 500 mg 3x a week after dialysis  -stopped danazol and jakafi, and continued ojjarra outpatient    #Hyperbilirubinemia  -Admission elevated Tbili=3.9, Dbili=0.4, indirect bili=3.5 (Tbili = 2.7 -->0.8 in Dec 2023)  - Admission AST/ALT WNL  - increased serum bilirubin is an AE that can be associated with Ojjaara    Recommend:     63F with PMH of noncirrhotic gastric varices, hypertension, polycythemia vera and secondary myelofibrosis, splenomegaly, ESRD on HD M/W/F, sent in from dialysis clinic due to elevated indirect bilirubin. Hematology consulted for polycythemia vera management.    #Polycythemia Vera  - Follows Dr. Jacky Guo at [facility]  - Hematologic history: Jakafi was initially started at 15mg TIW. Dose was decreased to 10mg TIW due to anemia though that was likely form the lack of danazol. Since then dose was adjusted back to 15mg then to 20mg TIW. Patient was taken off Jakafi with intent to switch to momelotinib (Ojjaara) 200mg daily in setting of dialysis.   Patient was started on the Ojjaara after notification of the splenomegaly, and Hg nazia to 9.8 fairly rapidly. Suspect this was from a combination of halting the Jakafi as well as starting the Ojjaara. Given the rapid rise, recommended patient stop the danazol such that we do not cause a hypercoagulable state with too high of a hg.  -Painful splenomegaly after a few days of starting Ojjaara so restarted hydroxyurea 500 mg 3x a week after dialysis  -stopped danazol and jakafi, and continued ojjarra outpatient    #Hyperbilirubinemia  -Admission elevated Tbili=3.9, Dbili=0.4, indirect bili=3.5 (Tbili = 2.7 -->0.8 in Dec 2023)  - AST/ALT WNL  -Hgb=10.4 (improving from prior labs), ZOH=385, hapto<20, retic=5.3%, Direct Any poly negative  - increased serum bilirubin possible AE associated with Ojjaara    Recommend:     63F with PMH of noncirrhotic gastric varices, hypertension, polycythemia vera and secondary myelofibrosis, splenomegaly, ESRD on HD M/W/F, sent in from dialysis clinic due to elevated indirect bilirubin. Hematology consulted for polycythemia vera management.    #Polycythemia Vera  - Follows Dr. Jacky Guo at [facility]  - Hematologic history: Jakafi was initially started at 15mg TIW. Dose was decreased to 10mg TIW due to anemia though that was likely form the lack of danazol. Since then dose was adjusted back to 15mg then to 20mg TIW. Patient was taken off Jakafi with intent to switch to momelotinib (Ojjaara) 200mg daily in setting of dialysis.   Patient was started on the Ojjaara in later December 2023 after notification of the splenomegaly, and Hg nazia to 9.8 fairly rapidly. Suspect this was from a combination of halting the Jakafi as well as starting the Ojjaara. Given the rapid rise, recommended patient stop the danazol such that we do not cause a hypercoagulable state with too high of a hg.  -She had painful splenomegaly after a few days of starting Ojjaara so restarted hydroxyurea 500 mg 3x a week after dialysis  -stopped danazol and jakafi, and initially continued ojjarra outpatient but reports she stopped taking Ojjaara in Jan 2024 due to diarrhea    #Hyperbilirubinemia  -Admission elevated Tbili=3.9, Dbili=0.4, indirect bili=3.5 (Tbili = 2.7 -->0.8 in Dec 2023)  - AST/ALT WNL  -Hgb=10.4 (improving from prior labs), LXO=490, hapto<20, retic=5.3%, Direct Any poly negative  - increased serum bilirubin possible AE associated with Ojjaara    Recommend:  - Hold Momelotinib for now while undergoing hyperbilirubinemia workup  - trend bilirubin/indirect bili daily  - Upon discharge follow up with Dr. Jacky Guo at [facility]    ***recs not final until attending attestation***    Michael Torres, PGY-4  Fellow Hematology/Oncology  pager [phone]  Available on TEAMS  After 5pm or on weekends please contact  to page on-call fellow        63F with PMH of noncirrhotic gastric varices, hypertension, polycythemia vera and secondary myelofibrosis, splenomegaly, ESRD on HD M/W/F, sent in from dialysis clinic due to elevated indirect bilirubin. Hematology consulted for polycythemia vera management.    #Polycythemia Vera and secondary myelofibrosis  - Follows Dr. Jacky Guo at [facility]  - Hematologic history: Jakafi was initially started at 15mg TIW. Dose was decreased to 10mg TIW due to anemia though that was likely form the lack of danazol. Since then dose was adjusted back to 15mg then to 20mg TIW. Patient was taken off Jakafi with intent to switch to momelotinib (Ojjaara) 200mg daily in setting of dialysis.   Patient was started on the Ojjaara in later December 2023 after notification of the splenomegaly, and Hg nazia to 9.8 fairly rapidly. Suspect this was from a combination of halting the Jakafi as well as starting the Ojjaara. Given the rapid rise, recommended patient stop the danazol such that we do not cause a hypercoagulable state with too high of a hg.  -She had painful splenomegaly after a few days of starting Ojjaara so restarted hydroxyurea 500 mg 3x a week after dialysis  -stopped danazol and jakafi, and initially continued ojjarra outpatient but reports she stopped taking Ojjaara in Jan 2024 due to diarrhea    #Hyperbilirubinemia  -Admission elevated Tbili=3.9, Dbili=0.4, indirect bili=3.5 (Tbili = 2.7 -->0.8 in Dec 2023)  - AST/ALT WNL  -Hgb=10.4 (improving from prior labs), IVL=717, hapto<20, retic=5.3%, Direct Any poly negative - low grade hemolysis in the setting of myelofibrosis  - increased serum bilirubin possible AE associated with Ojjaara, but she had elevated bilirubin levels in 2022 prior to starting Ojjaara    Recommend:  - Hold Momelotinib for now while undergoing hyperbilirubinemia workup  - trend bilirubin/indirect bili daily  - Upon discharge follow up with Dr. Jacky Guo at [facility]    ***recs not final until attending attestation***    Michael Torres, PGY-4  Fellow Hematology/Oncology  pager [phone]  Available on TEAMS  After 5pm or on weekends please contact  to page on-call fellow        63F with PMH of noncirrhotic gastric varices, hypertension, polycythemia vera and secondary myelofibrosis, splenomegaly, ESRD on HD M/W/F, sent in from dialysis clinic due to elevated indirect bilirubin. Hematology consulted for polycythemia vera management.    #Polycythemia Vera and secondary myelofibrosis  - Follows Dr. Jacky Guo at [facility]  - Hematologic history: Jakafi was initially started at 15mg TIW. Dose was decreased to 10mg TIW due to anemia though that was likely form the lack of danazol. Since then dose was adjusted back to 15mg then to 20mg TIW. Patient was taken off Jakafi with intent to switch to momelotinib (Ojjaara) 200mg daily in setting of dialysis.   Patient was started on the Ojjaara in later December 2023 after notification of the splenomegaly, and Hg nazia to 9.8 fairly rapidly. Suspect this was from a combination of halting the Jakafi as well as starting the Ojjaara. Given the rapid rise, recommended patient stop the danazol such that we do not cause a hypercoagulable state with too high of a hg.  -She had painful splenomegaly after a few days of starting Ojjaara so restarted hydroxyurea 500 mg 3x a week after dialysis  -stopped danazol and jakafi,  -Initially continued ojjarra outpatient but reports she stopped taking Ojjaara in Jan 2024 due to diarrhea    #Hyperbilirubinemia  -Admission elevated Tbili=3.9, Dbili=0.4, indirect bili=3.5 (Noted elevated Tbili since 2021)  - AST/ALT WNL  -Hgb=10.4 (improving from prior labs), VQU=162, hapto<20, retic=5.3%, Direct Any poly negative - low grade hemolysis in the setting of myelofibrosis  - peripheral smear reviewed, showed dacrocytes, rouleaux formation and few dysplastic WBCs. No left shift WBC or nucleated RBCs seen.  - Although elevated serum bilirubin is a possible AE associated with Ojjaara, she had elevated bilirubin levels for years prior to starting Ojjaara  - Hyperbilirubinemia of unclear etiology - possibly Gilbert's disease given chronic hyperbilirubinemia with elevations during stress, and reported family history of jaundice/scleral icterus    Recommend:  - Start Fedratinib 200mg daily for refractory secondary myelofibrosis (patient discontinued Ojjaara)  - check immunoglobulin panel and uric acid  - Hepatology consult to evaluate hyperbilirubinemia and pruritis  - Upon discharge follow up with Dr. Jacky Guo at [facility]    Case discussed w/ Dr. Donal Torres, PGY-4  Fellow Hematology/Oncology  pager [phone]  Available on TEAMS  After 5pm or on weekends please contact  to page on-call fellow        63F with PMH of noncirrhotic gastric varices, hypertension, polycythemia vera and secondary myelofibrosis, splenomegaly, ESRD on HD M/W/F, sent in from dialysis clinic due to elevated indirect bilirubin. Hematology consulted for polycythemia vera management.    #Polycythemia Vera and secondary myelofibrosis  - Follows Dr. Jacky Guo at [facility]  - Hematologic history: Jakafi was initially started at 15mg TIW. Dose was decreased to 10mg TIW due to anemia though that was likely form the lack of danazol. Since then dose was adjusted back to 15mg then to 20mg TIW. Patient was taken off Jakafi with intent to switch to momelotinib (Ojjaara) 200mg daily in setting of dialysis.   Patient was started on the Ojjaara in later December 2023 after notification of the splenomegaly, and Hg nazia to 9.8 fairly rapidly. Suspect this was from a combination of halting the Jakafi as well as starting the Ojjaara. Given the rapid rise, recommended patient stop the danazol such that we do not cause a hypercoagulable state with too high of a hg.  -She had painful splenomegaly after a few days of starting Ojjaara so restarted hydroxyurea 500 mg 3x a week after dialysis  -stopped danazol and jakafi,  -Initially continued ojjarra outpatient but reports she stopped taking Ojjaara in Jan 2024 due to diarrhea    #Hyperbilirubinemia  -Admission elevated Tbili=3.9, Dbili=0.4, indirect bili=3.5 (Noted elevated Tbili since 2021)  - AST/ALT WNL  -Hgb=10.4 (improving from prior labs), MKY=677, hapto<20, retic=5.3%, Direct Any poly negative - low grade hemolysis in the setting of myelofibrosis  - peripheral smear reviewed, showed dacrocytes, rouleaux formation and few dysplastic WBCs. No left shift WBC or nucleated RBCs seen.  - Although elevated serum bilirubin is a possible AE associated with Ojjaara, she had elevated bilirubin levels for years prior to starting Ojjaara  - Hyperbilirubinemia of unclear etiology - possibly Gilbert's disease given chronic hyperbilirubinemia with elevations during stress, and reported family history of jaundice/scleral icterus  - consider starting Fedratinib 200mg daily for refractory secondary myelofibrosis if bilirubin improves    Recommend:  - check immunoglobulin panel and uric acid  - Hepatology consult to evaluate hyperbilirubinemia and pruritis  - Upon discharge follow up with Dr. Jacky Guo at [facility]    Case discussed w/ Dr. Donal Torres, PGY-4  Fellow Hematology/Oncology  pager [phone]  Available on TEAMS  After 5pm or on weekends please contact  to page on-call fellow

## 2024-02-27 NOTE — H&P ADULT - PROBLEM SELECTOR PLAN 2
Gastric varices present prior to admission    Plan:  -c/w coreg 3.125 BID, can go up to 6.25 BID if needed

## 2024-02-27 NOTE — H&P ADULT - PROBLEM SELECTOR PLAN 5
For last several months. Patient known to have loose rectal tone. Has colonoscopy scheduled in March.    Plan:  -continue outpatient plan, no interventions inpatient

## 2024-02-27 NOTE — H&P ADULT - ATTENDING COMMENTS
63F myelofibrosis, polycythemia vera, ESRD, portal HTN w gastric varices, sent in from HD center due to hyperbilirubinemia.    #Hyperbilirubinemia  Unconjugated bilirubin elevation, likely due to hemolysis. LDH significantly elevated on admission labs  - Hematology consult requested, will follow up recommendations   - No signs of active bleeding, no melena     #Portal HTN  No signs of UGIB  - Continue with home BB    Remainder as above

## 2024-02-27 NOTE — H&P ADULT - NSHPREVIEWOFSYSTEMS_GEN_ALL_CORE
REVIEW OF SYSTEMS:    CONSTITUTIONAL:  No weakness, fevers or chills  EYES/ENT:  No visual changes;  No vertigo or throat pain   NECK:  No pain or stiffness  RESPIRATORY:  No cough, wheezing, hemoptysis; No shortness of breath  CARDIOVASCULAR:  No chest pain or palpitations  GASTROINTESTINAL:  No abdominal or epigastric pain. No nausea, vomiting, or hematemesis; No diarrhea or constipation. No melena or hematochezia.  GENITOURINARY:  No dysuria, frequency or hematuria  NEUROLOGICAL:  No numbness or weakness  SKIN:  No itching, rashes REVIEW OF SYSTEMS:    CONSTITUTIONAL:  No weakness, fevers or chills. Pruitus present.  EYES/ENT:  No visual changes;  No vertigo or throat pain   NECK:  No pain or stiffness  RESPIRATORY:  No cough, wheezing, hemoptysis; No shortness of breath  CARDIOVASCULAR:  No chest pain or palpitations  GASTROINTESTINAL:  No abdominal or epigastric pain. Yes nausea, vomiting. No hematemesis; No diarrhea or constipation but fecal incontinence. No melena or hematochezia.  GENITOURINARY:  No dysuria, frequency or hematuria  NEUROLOGICAL:  No numbness or weakness  SKIN:  No itching, rashes

## 2024-02-27 NOTE — PATIENT PROFILE ADULT - PRO INTERPRETER NEED 2
Patient called in regards to his referral to Neurology. He was wondering if it was possible to be referred to Dr. Ambriz (patient is unsure of spelling).     He states he does not know where Dr. Ambriz works, but heard from a friend who is a nurse that he is really good.    Please call patient and advise.   [place]

## 2024-02-27 NOTE — H&P ADULT - HISTORY OF PRESENT ILLNESS
History:  63F with myelofibrosis, polycythemia vera, ESRD, portal HTN w/ gastric varices, sent in from dialysis clinic due to elevated indirect bilirubin.    ED vitals    ED interventions 63F with myelofibrosis, polycythemia vera, ESRD, portal HTN w/ gastric varices, sent in from dialysis clinic due to elevated indirect bilirubin. Patient has been experiencing pruitus for the past month which was worse while in dialysis on 02/26 which prompted staff to check liver labs. She was previously on Ojjara but discontinued it due to diarrhea. She has baseline anemia and receives transfusions every 2-3 months through her mediport.     Otherwise patient has been experiencing nausea and daily bilious emesis in the mornings. Negative abdominal pain, melena or hematochezia but patient has been having fecal incontinence the past month.     In the ED, VSS. Bilirubin at 3.9 with indirect bilirubin at 3.5. LDH at 448. Rest of hemolysis labs pending. Patient received zofran and benadryl and admitted to medicine for further management.

## 2024-02-27 NOTE — H&P ADULT - NSHPPHYSICALEXAM_GEN_ALL_CORE
LOS:     VITALS:   T(C): 36.7 (02-27-24 @ 05:13), Max: 36.7 (02-26-24 @ 22:11)  HR: 86 (02-27-24 @ 05:13) (84 - 97)  BP: 150/79 (02-27-24 @ 05:13) (130/81 - 150/79)  RR: 18 (02-27-24 @ 05:13) (17 - 18)  SpO2: 96% (02-27-24 @ 05:13) (95% - 96%)    GENERAL: NAD, lying in bed comfortably  HEAD:  Atraumatic, Normocephalic  EYES: EOMI, PERRLA, conjunctiva and sclera clear  ENT: Moist mucous membranes  NECK: Supple, No JVD  CHEST/LUNG: Clear to auscultation bilaterally; No rales, rhonchi, wheezing, or rubs. Unlabored respirations  HEART: Regular rate and rhythm; No murmurs, rubs, or gallops  ABDOMEN: BSx4; Soft, nontender, nondistended  EXTREMITIES:  2+ Peripheral Pulses, brisk capillary refill. No clubbing, cyanosis, or edema  NERVOUS SYSTEM:  A&Ox3, no focal deficits   SKIN: No rashes or lesions LOS:     VITALS:   T(C): 36.7 (02-27-24 @ 05:13), Max: 36.7 (02-26-24 @ 22:11)  HR: 86 (02-27-24 @ 05:13) (84 - 97)  BP: 150/79 (02-27-24 @ 05:13) (130/81 - 150/79)  RR: 18 (02-27-24 @ 05:13) (17 - 18)  SpO2: 96% (02-27-24 @ 05:13) (95% - 96%)    GENERAL: NAD, lying in bed comfortably, jaundiced  HEAD:  Atraumatic, Normocephalic  EYES: EOMI, PERRLA, conjunctiva and sclera clear  ENT: Moist mucous membranes  NECK: Supple, No JVD  CHEST/LUNG: Clear to auscultation bilaterally; No rales, rhonchi, wheezing, or rubs. Unlabored respirations  HEART: Regular rate and rhythm; No murmurs, rubs, or gallops  ABDOMEN: BSx4; hepatosplenomegaly present, nontender, nondistended  EXTREMITIES:  2+ Peripheral Pulses, brisk capillary refill. No clubbing, cyanosis, or edema  NERVOUS SYSTEM:  A&Ox3, no focal deficits   SKIN: No rashes or lesions

## 2024-02-27 NOTE — H&P ADULT - NSHPLABSRESULTS_GEN_ALL_CORE
(02-26 @ 22:29)                      10.5  7.15 )-----------( 111                 34.1    Neutrophils = 6.31 (87.3%)  Lymphocytes = 0.65 (9.1%)  Eosinophils = 0.00 (0.0%)  Basophils = 0.13 (1.8%)  Monocytes = 0.00 (0.0%)  Bands = 0.9%    02-26    133<L>  |  92<L>  |  13  ----------------------------<  101<H>  3.4<L>   |  29  |  3.18<H>    Ca    9.1      26 Feb 2024 22:29    TPro  7.3  /  Alb  4.0  /  TBili  3.9<H>  /  DBili  0.4<H>  /  AST  29  /  ALT  19  /  AlkPhos  100  02-26          RVP:          Tox:         Urinalysis Basic - ( 26 Feb 2024 22:29 )    Color: x / Appearance: x / SG: x / pH: x  Gluc: 101 mg/dL / Ketone: x  / Bili: x / Urobili: x   Blood: x / Protein: x / Nitrite: x   Leuk Esterase: x / RBC: x / WBC x   Sq Epi: x / Non Sq Epi: x / Bacteria: x

## 2024-02-27 NOTE — ED ADULT NURSE REASSESSMENT NOTE - NS ED NURSE REASSESS COMMENT FT1
Pt appears comfortable at this time. Pt denies chest pain, chest palpitations, SOB, n/v/d at this time. V/S as documented. Pt provided with food and PO liquids. Plan of care and monitoring discussed with pt.

## 2024-02-27 NOTE — PATIENT PROFILE ADULT - NSPROIMPLANTSMEDDEV_GEN_A_NUR
Pt called and wants to know the test results   Informed pt that need to address with Dr. Gutierrez   Pt in agreement   IMPRESSION:  1. There is no acute intracranial abnormality.  2. There are confluent areas of decreased white matter attenuation that is more pronounced in the frontal and parietal lobes; though nonspecific these  most likely relate to sequela of chronic small vessel ischemia and is not significantly changed compared to 2015.      Vascular access device

## 2024-02-27 NOTE — H&P ADULT - PROBLEM SELECTOR PLAN 1
Patient presenting with unconjugated bilirubinemia    Unconjugated bilirubinemia differential:  Increased bilirubin production -> hemolysis, bleeding, hematoma, transfusions  Impaired bilirubin uptake -> normally drug induced, poor hepatic perfusion (CHF, portosystemic shunts/thrombosis)  Impaired bilirubin conjugation -> Gilbert, Criggler najjar, certain drugs    Plan:  -f/u hemolysis labs (LDH, hapto, retic)  -consult heme regarding treatment

## 2024-02-27 NOTE — CONSULT NOTE ADULT - SUBJECTIVE AND OBJECTIVE BOX
[facility] DIVISION OF KIDNEY DISEASES AND HYPERTENSION -- [phone]  -- INITIAL CONSULT NOTE  --------------------------------------------------------------------------------  HPI: 63-year-old female with ESRD on HD ([facility], [facility], Dr. De León), Polycythemia Vera, HTN, Pancreatic cyst who presented to [facility] due to abnormal labs (elevated bilirubin). Nephrology consulted for ESRD/HD management.    Pt seen and examined. She is "feeling okay". Has had recent nausea and vomiting but no overt abdominal pain. She notes she has had itching with HD and with n/v, thus LFTs/bilirubin labs drawn at HD unit, found to have elevated bilirubin with normal LFTs. She denied recent fevers, chills, diarrhea or shortness of breath. She still produces some urine. Last HD on 2/26/24. Plan for next HD on 2/28/24.     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  Pancreatic cyst  History of myelofibrosis  Peritoneal dialysis catheter in place  Placed 8/9/2017  Hemoperitoneum as complication of peritoneal dialysis  s/p removal 9/18  AV fistula  Placed 9/18      FAMILY HISTORY:  Family history of hypertension in mother  Family history of malignant neoplasm (Grandparent)  head and neck in father    FH: cirrhosis (Sibling)    PAST SOCIAL HISTORY: No illicit drugs     ALLERGIES & MEDICATIONS  --------------------------------------------------------------------------------  Allergies  No Known Allergies    Intolerances    Standing Inpatient Medications  calcium acetate 667 milliGRAM(s) Oral three times a day with meals  carvedilol 3.125 milliGRAM(s) Oral every 12 hours  chlorhexidine 2% Cloths 1 Application(s) Topical daily  famotidine    Tablet 20 milliGRAM(s) Oral daily  influenza   Vaccine 0.5 milliLiter(s) IntraMuscular once  multivitamin 1 Tablet(s) Oral daily  ursodiol Capsule 300 milliGRAM(s) Oral two times a day    PRN Inpatient Medications  sodium chloride 0.9% Bolus. 100 milliLiter(s) IV Bolus every 5 minutes PRN    REVIEW OF SYSTEMS  --------------------------------------------------------------------------------  Gen: No fevers/chills  Head/Eyes/Ears: No HA  Respiratory: No dyspnea, cough  CV: No chest pain  GI: +Nausea, abdominal discomfort   : No dysuria, hematuria  MSK: No edema  Skin: No rashes  Heme: No easy bruising or bleeding    All other systems were reviewed and are negative, except as noted.    VITALS/PHYSICAL EXAM  --------------------------------------------------------------------------------  T(C): 36.7 (02-27-24 @ 13:12), Max: 36.8 (02-27-24 @ 08:14)  HR: 80 (02-27-24 @ 13:12) (80 - 97)  BP: 129/75 (02-27-24 @ 13:12) (126/77 - 150/79)  RR: 18 (02-27-24 @ 13:12) (17 - 18)  SpO2: 98% (02-27-24 @ 13:12) (95% - 98%)  Wt(kg): --  Height (cm): 157.5 (02-27-24 @ 13:12)  Weight (kg): 53.5 (02-27-24 @ 13:12)  BMI (kg/m2): 21.6 (02-27-24 @ 13:12)  BSA (m2): 1.53 (02-27-24 @ 13:12)    Physical Exam:  	Gen: NAD  	HEENT: Anicteric  	Pulm: CTA B/L  	CV: S1S2+  	Abd: Soft, +BS    	Ext: No LE edema B/L  	Neuro: Awake  	Skin: Warm and dry  	Dialysis access: [facility]    LABS/STUDIES  --------------------------------------------------------------------------------              10.4   6.39  >-----------<  110      [02-27-24 @ 10:49]              34.6     134  |  94  |  19  ----------------------------<  101      [02-27-24 @ 10:49]  4.5   |  27  |  4.48        Ca     8.9     [02-27-24 @ 10:49]      Mg     2.2     [02-27-24 @ 10:49]      Phos  5.3     [02-27-24 @ 10:49]    TPro  7.0  /  Alb  3.8  /  TBili  4.0  /  DBili  0.4  /  AST  35  /  ALT  18  /  AlkPhos  92  [02-27-24 @ 10:49]          [02-26-24 @ 22:29]    Creatinine Trend:  SCr 4.48 [02-27 @ 10:49]  SCr 3.18 [02-26 @ 22:29]    HBsAb 14.6      [09-23-22 @ 13:28]  HBsAb Reactive      [07-29-21 @ 00:09]  HBsAg Nonreact      [09-23-22 @ 13:28]  HBcAb Nonreact      [09-23-22 @ 13:28]  HCV 0.12, Nonreact      [09-23-22 @ 13:28]  HIV Nonreact      [04-11-21 @ 08:31]    Syphilis Screen (Treponema Pallidum Ab) Negative      [09-08-21 @ 09:08]

## 2024-02-27 NOTE — CONSULT NOTE ADULT - SUBJECTIVE AND OBJECTIVE BOX
HPI:  History:  63F with myelofibrosis, polycythemia vera, ESRD, portal HTN w/ gastric varices, sent in from dialysis clinic due to elevated indirect bilirubin.    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    Pancreatic cyst    History of myelofibrosis    History of myelofibrosis    Peritoneal dialysis catheter in place  Placed 8/9/2017    Hemoperitoneum as complication of peritoneal dialysis  s/p removal 9/18    AV fistula  Placed 9/18      Allergies  No Known Allergies    Intolerances      MEDICATIONS  (STANDING):    MEDICATIONS  (PRN):      FAMILY HISTORY:  Family history of hypertension in mother    Family history of malignant neoplasm (Grandparent)  head and neck in father    FH: cirrhosis (Sibling)      SOCIAL HISTORY: no tobacco    REVIEW OF SYSTEMS:    CONSTITUTIONAL: No weakness, fevers or chills  EYES/ENT: No visual changes;  No vertigo or throat pain   NECK: No pain or stiffness  RESPIRATORY: No cough, wheezing, hemoptysis; No shortness of breath  CARDIOVASCULAR: No chest pain or palpitations  GASTROINTESTINAL: No abdominal or epigastric pain. No nausea, vomiting, or hematemesis; No diarrhea or constipation. No melena or hematochezia.  GENITOURINARY: No dysuria, frequency or hematuria  NEUROLOGICAL: No numbness or weakness  SKIN: No itching, burning, rashes, or lesions   All other review of systems is negative unless indicated above.    Height (cm): 157.5 (02-26 @ 21:28)  Weight (kg): 53.6 (02-26 @ 21:28)  BMI (kg/m2): 21.6 (02-26 @ 21:28)  BSA (m2): 1.53 (02-26 @ 21:28)    T(F): 98.2 (02-27-24 @ 08:14), Max: 98.2 (02-27-24 @ 08:14)  HR: 85 (02-27-24 @ 08:14)  BP: 126/77 (02-27-24 @ 08:14)  RR: 18 (02-27-24 @ 08:14)  SpO2: 96% (02-27-24 @ 08:14)  Wt(kg): --    GENERAL: NAD  HEAD:  Atraumatic, Normocephalic  EYES: EOMI, conjunctiva and sclera clear  NECK: Supple  CHEST/LUNG: Clear to auscultation bilaterally; No wheeze  HEART: Regular rate and rhythm; No murmurs, rubs, or gallops  ABDOMEN: Soft, Nontender, Nondistended; Bowel sounds present  EXTREMITIES:  2+ Peripheral Pulses, No clubbing, cyanosis, or edema  NEUROLOGY: non-focal  SKIN: No rashes or lesions                          10.5   7.15  )-----------( 111      ( 26 Feb 2024 22:29 )             34.1       02-26    133<L>  |  92<L>  |  13  ----------------------------<  101<H>  3.4<L>   |  29  |  3.18<H>    Ca    9.1      26 Feb 2024 22:29    TPro  7.3  /  Alb  4.0  /  TBili  3.9<H>  /  DBili  0.4<H>  /  AST  29  /  ALT  19  /  AlkPhos  100  02-26      Lactate Dehydrogenase, Serum: 448 U/L (02-26 @ 22:29)          .Stool Feces  12-19 @ 14:29   No enteric pathogens isolated.  (Stool culture examined for Salmonella,  Shigella, Campylobacter, Aeromonas, Plesiomonas,  Vibrio, E.coli O157 and Yersinia)  --  --      Clean Catch Clean Catch (Midstream)  12-18 @ 21:23   <10,000 CFU/mL Normal Urogenital Caroline  --  --      .Blood Blood-Peripheral  12-18 @ 19:55   No growth at 5 days  --  --      .Blood Blood-Peripheral  12-18 @ 19:40   No growth at 5 days  --  --       HPI:  History:  63F with myelofibrosis, polycythemia vera, ESRD, portal HTN w/ gastric varices, sent in from dialysis clinic due to elevated indirect bilirubin.    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    Pancreatic cyst    History of myelofibrosis    History of myelofibrosis    Peritoneal dialysis catheter in place  Placed 8/9/2017    Hemoperitoneum as complication of peritoneal dialysis  s/p removal 9/18    AV fistula  Placed 9/18      Allergies  No Known Allergies    Intolerances      MEDICATIONS  (STANDING):    MEDICATIONS  (PRN):      FAMILY HISTORY:  Family history of hypertension in mother    Family history of malignant neoplasm (Grandparent)  head and neck in father    FH: cirrhosis (Sibling)      SOCIAL HISTORY: no tobacco    REVIEW OF SYSTEMS:    CONSTITUTIONAL: No weakness, +itchiness  EYES/ENT: No visual changes;  No vertigo or throat pain   NECK: No pain or stiffness  RESPIRATORY: No cough, wheezing, hemoptysis; No shortness of breath  CARDIOVASCULAR: No chest pain or palpitations  GASTROINTESTINAL: +Diarrhea   GENITOURINARY: No dysuria, frequency or hematuria  NEUROLOGICAL: No numbness or weakness  SKIN: No itching, burning, rashes, or lesions   All other review of systems is negative unless indicated above.    Height (cm): 157.5 (02-26 @ 21:28)  Weight (kg): 53.6 (02-26 @ 21:28)  BMI (kg/m2): 21.6 (02-26 @ 21:28)  BSA (m2): 1.53 (02-26 @ 21:28)    T(F): 98.2 (02-27-24 @ 08:14), Max: 98.2 (02-27-24 @ 08:14)  HR: 85 (02-27-24 @ 08:14)  BP: 126/77 (02-27-24 @ 08:14)  RR: 18 (02-27-24 @ 08:14)  SpO2: 96% (02-27-24 @ 08:14)  Wt(kg): --    GENERAL: NAD  HEAD:  Atraumatic, Normocephalic  EYES: EOMI, conjunctiva and sclera clear  NECK: Supple  CHEST/LUNG: Clear to auscultation bilaterally; No wheeze  HEART: Regular rate and rhythm; No murmurs, rubs, or gallops  ABDOMEN: Soft, +mild tenderness to palpation in LUQ, Nondistended  EXTREMITIES:  2+ Peripheral Pulses, No LE edema  NEUROLOGY: AOx3  SKIN: No rashes or lesions                          10.5   7.15  )-----------( 111      ( 26 Feb 2024 22:29 )             34.1       02-26    133<L>  |  92<L>  |  13  ----------------------------<  101<H>  3.4<L>   |  29  |  3.18<H>    Ca    9.1      26 Feb 2024 22:29    TPro  7.3  /  Alb  4.0  /  TBili  3.9<H>  /  DBili  0.4<H>  /  AST  29  /  ALT  19  /  AlkPhos  100  02-26      Lactate Dehydrogenase, Serum: 448 U/L (02-26 @ 22:29)          .Stool Feces  12-19 @ 14:29   No enteric pathogens isolated.  (Stool culture examined for Salmonella,  Shigella, Campylobacter, Aeromonas, Plesiomonas,  Vibrio, E.coli O157 and Yersinia)  --  --      Clean Catch Clean Catch (Midstream)  12-18 @ 21:23   <10,000 CFU/mL Normal Urogenital Caroline  --  --      .Blood Blood-Peripheral  12-18 @ 19:55   No growth at 5 days  --  --      .Blood Blood-Peripheral  12-18 @ 19:40   No growth at 5 days  --  --       HPI:  History:  63F with myelofibrosis, polycythemia vera, ESRD, portal HTN w/ gastric varices, sent in from dialysis clinic due to elevated indirect bilirubin.    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    Pancreatic cyst    History of myelofibrosis    History of myelofibrosis    Peritoneal dialysis catheter in place  Placed 8/9/2017    Hemoperitoneum as complication of peritoneal dialysis  s/p removal 9/18    AV fistula  Placed 9/18      Allergies  No Known Allergies    Intolerances      MEDICATIONS  (STANDING):    MEDICATIONS  (PRN):      FAMILY HISTORY:  Family history of hypertension in mother    Family history of malignant neoplasm (Grandparent)  head and neck in father    FH: cirrhosis (Sibling)      SOCIAL HISTORY: no tobacco    REVIEW OF SYSTEMS:    CONSTITUTIONAL: No weakness, +itchiness  EYES/ENT: No visual changes;  No vertigo or throat pain   NECK: No pain or stiffness  RESPIRATORY: No cough, wheezing, hemoptysis; No shortness of breath  CARDIOVASCULAR: No chest pain or palpitations  GASTROINTESTINAL: +Diarrhea   GENITOURINARY: No dysuria, frequency or hematuria  NEUROLOGICAL: No numbness or weakness  SKIN: No itching, burning, rashes, or lesions   All other review of systems is negative unless indicated above.    Height (cm): 157.5 (02-26 @ 21:28)  Weight (kg): 53.6 (02-26 @ 21:28)  BMI (kg/m2): 21.6 (02-26 @ 21:28)  BSA (m2): 1.53 (02-26 @ 21:28)    T(F): 98.2 (02-27-24 @ 08:14), Max: 98.2 (02-27-24 @ 08:14)  HR: 85 (02-27-24 @ 08:14)  BP: 126/77 (02-27-24 @ 08:14)  RR: 18 (02-27-24 @ 08:14)  SpO2: 96% (02-27-24 @ 08:14)  Wt(kg): --    GENERAL: NAD  HEAD:  Atraumatic, Normocephalic  EYES: EOMI, +scleral icterus   NECK: Supple  CHEST/LUNG: Clear to auscultation bilaterally; No wheeze  HEART: Regular rate and rhythm; No murmurs  ABDOMEN: Soft, +mild tenderness to palpation in LUQ, Nondistended  EXTREMITIES:  2+ Peripheral Pulses, No LE edema  NEUROLOGY: AOx3  SKIN: No rashes or lesions                          10.5   7.15  )-----------( 111      ( 26 Feb 2024 22:29 )             34.1       02-26    133<L>  |  92<L>  |  13  ----------------------------<  101<H>  3.4<L>   |  29  |  3.18<H>    Ca    9.1      26 Feb 2024 22:29    TPro  7.3  /  Alb  4.0  /  TBili  3.9<H>  /  DBili  0.4<H>  /  AST  29  /  ALT  19  /  AlkPhos  100  02-26      Lactate Dehydrogenase, Serum: 448 U/L (02-26 @ 22:29)          .Stool Feces  12-19 @ 14:29   No enteric pathogens isolated.  (Stool culture examined for Salmonella,  Shigella, Campylobacter, Aeromonas, Plesiomonas,  Vibrio, E.coli O157 and Yersinia)  --  --      Clean Catch Clean Catch (Midstream)  12-18 @ 21:23   <10,000 CFU/mL Normal Urogenital Caroline  --  --      .Blood Blood-Peripheral  12-18 @ 19:55   No growth at 5 days  --  --      .Blood Blood-Peripheral  12-18 @ 19:40   No growth at 5 days  --  --

## 2024-02-27 NOTE — CONSULT NOTE ADULT - PROBLEM SELECTOR RECOMMENDATION 9
Pt. with ESRD on HD TIW (MWF, [facility], [facility], Dr. De León). Last HD as outpatient was done on 2/26/24 via [facility]. Labs reviewed (electrolytes stable). Pt. appears clinically stable. Will arrange for maintenance HD tomorrow. Discussed with the patient that she will require RRT/HD, risks and benefits associated with RRT/HD explained at length. HD consent obtained and kept in patients chart. Monitor labs. Dose meds as per HD.    Monitor Hgb (per chart review, she is on EPO 20K units with HD, will confirm)  Continue home Phoslo.

## 2024-02-28 ENCOUNTER — TRANSCRIPTION ENCOUNTER (OUTPATIENT)
Age: 64
End: 2024-02-28

## 2024-02-28 VITALS
SYSTOLIC BLOOD PRESSURE: 140 MMHG | TEMPERATURE: 99 F | DIASTOLIC BLOOD PRESSURE: 79 MMHG | RESPIRATION RATE: 20 BRPM | HEART RATE: 87 BPM | OXYGEN SATURATION: 98 %

## 2024-02-28 LAB
ALBUMIN SERPL ELPH-MCNC: 3.7 G/DL — SIGNIFICANT CHANGE UP (ref 3.3–5)
ALP SERPL-CCNC: 84 U/L — SIGNIFICANT CHANGE UP (ref 40–120)
ALT FLD-CCNC: 18 U/L — SIGNIFICANT CHANGE UP (ref 10–45)
ANION GAP SERPL CALC-SCNC: 15 MMOL/L — SIGNIFICANT CHANGE UP (ref 5–17)
ANISOCYTOSIS BLD QL: SLIGHT — SIGNIFICANT CHANGE UP
AST SERPL-CCNC: 25 U/L — SIGNIFICANT CHANGE UP (ref 10–40)
BASOPHILS # BLD AUTO: 0 K/UL — SIGNIFICANT CHANGE UP (ref 0–0.2)
BASOPHILS NFR BLD AUTO: 0 % — SIGNIFICANT CHANGE UP (ref 0–2)
BILIRUB DIRECT SERPL-MCNC: 0.5 MG/DL — HIGH (ref 0–0.3)
BILIRUB INDIRECT FLD-MCNC: 3.6 MG/DL — HIGH (ref 0.2–1)
BILIRUB SERPL-MCNC: 4.1 MG/DL — HIGH (ref 0.2–1.2)
BILIRUB SERPL-MCNC: 4.1 MG/DL — HIGH (ref 0.2–1.2)
BUN SERPL-MCNC: 34 MG/DL — HIGH (ref 7–23)
CALCIUM SERPL-MCNC: 8.6 MG/DL — SIGNIFICANT CHANGE UP (ref 8.4–10.5)
CHLORIDE SERPL-SCNC: 98 MMOL/L — SIGNIFICANT CHANGE UP (ref 96–108)
CO2 SERPL-SCNC: 23 MMOL/L — SIGNIFICANT CHANGE UP (ref 22–31)
CREAT SERPL-MCNC: 6.18 MG/DL — HIGH (ref 0.5–1.3)
DACRYOCYTES BLD QL SMEAR: SLIGHT — SIGNIFICANT CHANGE UP
DAT POLY-SP REAG RBC QL: NEGATIVE — SIGNIFICANT CHANGE UP
EGFR: 7 ML/MIN/1.73M2 — LOW
EOSINOPHIL # BLD AUTO: 0.32 K/UL — SIGNIFICANT CHANGE UP (ref 0–0.5)
EOSINOPHIL NFR BLD AUTO: 5 % — SIGNIFICANT CHANGE UP (ref 0–6)
FERRITIN SERPL-MCNC: 1383 NG/ML — HIGH (ref 13–330)
GLUCOSE SERPL-MCNC: 89 MG/DL — SIGNIFICANT CHANGE UP (ref 70–99)
HAV IGM SER-ACNC: SIGNIFICANT CHANGE UP
HBV CORE IGM SER-ACNC: SIGNIFICANT CHANGE UP
HBV SURFACE AG SER-ACNC: SIGNIFICANT CHANGE UP
HBV SURFACE AG SER-ACNC: SIGNIFICANT CHANGE UP
HCT VFR BLD CALC: 34.3 % — LOW (ref 34.5–45)
HCV AB S/CO SERPL IA: 0.18 S/CO — SIGNIFICANT CHANGE UP (ref 0–0.99)
HCV AB SERPL-IMP: SIGNIFICANT CHANGE UP
HGB BLD-MCNC: 9.9 G/DL — LOW (ref 11.5–15.5)
HYPOCHROMIA BLD QL: SLIGHT — SIGNIFICANT CHANGE UP
IGA FLD-MCNC: 141 MG/DL — SIGNIFICANT CHANGE UP (ref 84–499)
IGG FLD-MCNC: 1939 MG/DL — HIGH (ref 610–1660)
IGM SERPL-MCNC: 117 MG/DL — SIGNIFICANT CHANGE UP (ref 35–242)
IRON SATN MFR SERPL: 69 % — HIGH (ref 14–50)
IRON SATN MFR SERPL: 83 UG/DL — SIGNIFICANT CHANGE UP (ref 30–160)
KAPPA LC SER QL IFE: 13.85 MG/DL — HIGH (ref 0.33–1.94)
KAPPA/LAMBDA FREE LIGHT CHAIN RATIO, SERUM: 0.73 RATIO — SIGNIFICANT CHANGE UP (ref 0.26–1.65)
LAMBDA LC SER QL IFE: 19.04 MG/DL — HIGH (ref 0.57–2.63)
LYMPHOCYTES # BLD AUTO: 1.35 K/UL — SIGNIFICANT CHANGE UP (ref 1–3.3)
LYMPHOCYTES # BLD AUTO: 21 % — SIGNIFICANT CHANGE UP (ref 13–44)
MACROCYTES BLD QL: SLIGHT — SIGNIFICANT CHANGE UP
MAGNESIUM SERPL-MCNC: 2.2 MG/DL — SIGNIFICANT CHANGE UP (ref 1.6–2.6)
MANUAL SMEAR VERIFICATION: SIGNIFICANT CHANGE UP
MCHC RBC-ENTMCNC: 28.9 GM/DL — LOW (ref 32–36)
MCHC RBC-ENTMCNC: 28.9 PG — SIGNIFICANT CHANGE UP (ref 27–34)
MCV RBC AUTO: 100.3 FL — HIGH (ref 80–100)
METAMYELOCYTES # FLD: 1 % — HIGH (ref 0–0)
MONOCYTES # BLD AUTO: 0.32 K/UL — SIGNIFICANT CHANGE UP (ref 0–0.9)
MONOCYTES NFR BLD AUTO: 5 % — SIGNIFICANT CHANGE UP (ref 2–14)
MYELOCYTES NFR BLD: 2 % — HIGH (ref 0–0)
NEUTROPHILS # BLD AUTO: 4.24 K/UL — SIGNIFICANT CHANGE UP (ref 1.8–7.4)
NEUTROPHILS NFR BLD AUTO: 64 % — SIGNIFICANT CHANGE UP (ref 43–77)
NEUTS BAND # BLD: 2 % — SIGNIFICANT CHANGE UP (ref 0–8)
NEUTS HYPERSEG # BLD: PRESENT — SIGNIFICANT CHANGE UP
NRBC # BLD: 4 /100 WBCS — HIGH (ref 0–0)
OVALOCYTES BLD QL SMEAR: SLIGHT — SIGNIFICANT CHANGE UP
PHOSPHATE SERPL-MCNC: 5.9 MG/DL — HIGH (ref 2.5–4.5)
PLAT MORPH BLD: NORMAL — SIGNIFICANT CHANGE UP
PLATELET # BLD AUTO: 93 K/UL — LOW (ref 150–400)
POIKILOCYTOSIS BLD QL AUTO: SLIGHT — SIGNIFICANT CHANGE UP
POLYCHROMASIA BLD QL SMEAR: SLIGHT — SIGNIFICANT CHANGE UP
POTASSIUM SERPL-MCNC: 4.7 MMOL/L — SIGNIFICANT CHANGE UP (ref 3.5–5.3)
POTASSIUM SERPL-SCNC: 4.7 MMOL/L — SIGNIFICANT CHANGE UP (ref 3.5–5.3)
PROT SERPL-MCNC: 6.6 G/DL — SIGNIFICANT CHANGE UP (ref 6–8.3)
RBC # BLD: 3.42 M/UL — LOW (ref 3.8–5.2)
RBC # FLD: 20.8 % — HIGH (ref 10.3–14.5)
RBC BLD AUTO: ABNORMAL
SMUDGE CELLS # BLD: PRESENT — SIGNIFICANT CHANGE UP
SODIUM SERPL-SCNC: 136 MMOL/L — SIGNIFICANT CHANGE UP (ref 135–145)
TIBC SERPL-MCNC: 121 UG/DL — LOW (ref 220–430)
TRANSFERRIN SERPL-MCNC: 93 MG/DL — LOW (ref 200–360)
UIBC SERPL-MCNC: 37 UG/DL — LOW (ref 110–370)
URATE SERPL-MCNC: 11.8 MG/DL — HIGH (ref 2.5–7)
WBC # BLD: 6.42 K/UL — SIGNIFICANT CHANGE UP (ref 3.8–10.5)
WBC # FLD AUTO: 6.42 K/UL — SIGNIFICANT CHANGE UP (ref 3.8–10.5)

## 2024-02-28 PROCEDURE — 83550 IRON BINDING TEST: CPT

## 2024-02-28 PROCEDURE — 82728 ASSAY OF FERRITIN: CPT

## 2024-02-28 PROCEDURE — 82248 BILIRUBIN DIRECT: CPT

## 2024-02-28 PROCEDURE — 96375 TX/PRO/DX INJ NEW DRUG ADDON: CPT

## 2024-02-28 PROCEDURE — 83540 ASSAY OF IRON: CPT

## 2024-02-28 PROCEDURE — 36415 COLL VENOUS BLD VENIPUNCTURE: CPT

## 2024-02-28 PROCEDURE — 80074 ACUTE HEPATITIS PANEL: CPT

## 2024-02-28 PROCEDURE — 86901 BLOOD TYPING SEROLOGIC RH(D): CPT

## 2024-02-28 PROCEDURE — 82784 ASSAY IGA/IGD/IGG/IGM EACH: CPT

## 2024-02-28 PROCEDURE — 86880 COOMBS TEST DIRECT: CPT

## 2024-02-28 PROCEDURE — 83615 LACTATE (LD) (LDH) ENZYME: CPT

## 2024-02-28 PROCEDURE — 84550 ASSAY OF BLOOD/URIC ACID: CPT

## 2024-02-28 PROCEDURE — 71045 X-RAY EXAM CHEST 1 VIEW: CPT

## 2024-02-28 PROCEDURE — 84100 ASSAY OF PHOSPHORUS: CPT

## 2024-02-28 PROCEDURE — 83010 ASSAY OF HAPTOGLOBIN QUANT: CPT

## 2024-02-28 PROCEDURE — 96374 THER/PROPH/DIAG INJ IV PUSH: CPT

## 2024-02-28 PROCEDURE — 83735 ASSAY OF MAGNESIUM: CPT

## 2024-02-28 PROCEDURE — 85025 COMPLETE CBC W/AUTO DIFF WBC: CPT

## 2024-02-28 PROCEDURE — 80053 COMPREHEN METABOLIC PANEL: CPT

## 2024-02-28 PROCEDURE — 86922 COMPATIBILITY TEST ANTIGLOB: CPT

## 2024-02-28 PROCEDURE — 87340 HEPATITIS B SURFACE AG IA: CPT

## 2024-02-28 PROCEDURE — 82247 BILIRUBIN TOTAL: CPT

## 2024-02-28 PROCEDURE — 86850 RBC ANTIBODY SCREEN: CPT

## 2024-02-28 PROCEDURE — 99285 EMERGENCY DEPT VISIT HI MDM: CPT | Mod: 25

## 2024-02-28 PROCEDURE — 84466 ASSAY OF TRANSFERRIN: CPT

## 2024-02-28 PROCEDURE — 86900 BLOOD TYPING SEROLOGIC ABO: CPT

## 2024-02-28 PROCEDURE — 85045 AUTOMATED RETICULOCYTE COUNT: CPT

## 2024-02-28 PROCEDURE — 99261: CPT

## 2024-02-28 RX ORDER — URSODIOL 250 MG/1
1 TABLET, FILM COATED ORAL
Qty: 60 | Refills: 0
Start: 2024-02-28 | End: 2024-03-28

## 2024-02-28 RX ADMIN — Medication 1 TABLET(S): at 13:34

## 2024-02-28 RX ADMIN — URSODIOL 300 MILLIGRAM(S): 250 TABLET, FILM COATED ORAL at 06:01

## 2024-02-28 RX ADMIN — FAMOTIDINE 20 MILLIGRAM(S): 10 INJECTION INTRAVENOUS at 13:33

## 2024-02-28 RX ADMIN — URSODIOL 300 MILLIGRAM(S): 250 TABLET, FILM COATED ORAL at 18:12

## 2024-02-28 RX ADMIN — CARVEDILOL PHOSPHATE 3.12 MILLIGRAM(S): 80 CAPSULE, EXTENDED RELEASE ORAL at 06:01

## 2024-02-28 RX ADMIN — CARVEDILOL PHOSPHATE 3.12 MILLIGRAM(S): 80 CAPSULE, EXTENDED RELEASE ORAL at 18:11

## 2024-02-28 RX ADMIN — Medication 667 MILLIGRAM(S): at 13:34

## 2024-02-28 RX ADMIN — Medication 667 MILLIGRAM(S): at 08:53

## 2024-02-28 RX ADMIN — Medication 25 MILLIGRAM(S): at 00:36

## 2024-02-28 RX ADMIN — Medication 667 MILLIGRAM(S): at 18:12

## 2024-02-28 NOTE — DISCHARGE NOTE PROVIDER - NSDCFUSCHEDAPPT_GEN_ALL_CORE_FT
Joe Gil  [facility]  GASTR[address] [facility] [address]  Scheduled Appointment: 02/29/2024    [facility]  [address]STR[address]  Scheduled Appointment: 03/12/2024    [facility]  INTMED  [facility]   Scheduled Appointment: 03/25/2024    [facility]  [facility]  Scheduled Appointment: 04/04/2024    Jacky Guo  [facility]  [facility]  Scheduled Appointment: 04/04/2024    [facility]  GASTRO [id number]  Scheduled Appointment: 04/11/2024    Gabriel Frederick  [facility]  ENDOVASCULAR [address]   Scheduled Appointment: 05/02/2024     [facility]  [address]STR[address]  Scheduled Appointment: 03/12/2024    [facility]  [facility]  Scheduled Appointment: 04/04/2024    Jacky Guo  [facility]  [facility]  Scheduled Appointment: 04/04/2024    [facility]  INTMED  [facility]   Scheduled Appointment: 04/08/2024    [facility]  GASTRO [id number]  Scheduled Appointment: 04/11/2024    Gabriel Frederick  [facility]  ENDOVASCULAR [address]   Scheduled Appointment: 05/02/2024

## 2024-02-28 NOTE — DISCHARGE NOTE PROVIDER - NSDCMRMEDTOKEN_GEN_ALL_CORE_FT
Coreg 3.125 mg oral tablet: 1 tab(s) orally 2 times a day  epoetin filiberto: 36922 unit(s) intravenous Monday, Wednesday, and Friday with dialysis as per nephrology  gabapentin 100 mg oral capsule: 1 cap(s) orally once a day (at bedtime)  hydroxyurea 500 mg oral capsule: 500 milligram(s) orally 3 times a week  Nephro-Ben oral tablet: 1 tab(s) orally once a day  PhosLo 667 mg oral tablet: 2 tab(s) orally 3 times a day   Coreg 3.125 mg oral tablet: 1 tab(s) orally 2 times a day  epoetin filiberto: 64291 unit(s) intravenous Monday, Wednesday, and Friday with dialysis as per nephrology  gabapentin 100 mg oral capsule: 1 cap(s) orally once a day (at bedtime)  hydroxyurea 500 mg oral capsule: 500 milligram(s) orally 3 times a week  Nephro-Ben oral tablet: 1 tab(s) orally once a day  PhosLo 667 mg oral tablet: 2 tab(s) orally 3 times a day  ursodiol 300 mg oral capsule: 1 cap(s) orally 2 times a day

## 2024-02-28 NOTE — DISCHARGE NOTE PROVIDER - NSDCCPCAREPLAN_GEN_ALL_CORE_FT
PRINCIPAL DISCHARGE DIAGNOSIS  Diagnosis: Hyperbilirubinemia  Assessment and Plan of Treatment: You came to the hospital because one of the labs, the bilirubin, was elevated. It is still unclear what caused this elevation but likely due to increase in blood cell breakdown. When a blood cell breaksdown, the hemoglobin in the blood cell that carries oxygen metabolizes into bilirubin. A higher amount of breakdown leads to increased bilirubin, which makes your skin and eyes more yellow. You may also have a condition known as Gilbert.      SECONDARY DISCHARGE DIAGNOSES  Diagnosis: Pruritus  Assessment and Plan of Treatment:      PRINCIPAL DISCHARGE DIAGNOSIS  Diagnosis: Hyperbilirubinemia  Assessment and Plan of Treatment: You came to the hospital because one of the labs, the bilirubin, was elevated. It is still unclear what caused this elevation but likely due to increase in blood cell breakdown. When a blood cell breaksdown, the hemoglobin in the blood cell that carries oxygen metabolizes into bilirubin. A higher amount of breakdown leads to increased bilirubin, which makes your skin and eyes more yellow. You may also have a condition known as Gilbert where the liver is unable to metabolize the bilirubin as well. Both possibilities lead to the build up of bilirubin which leads to jaundice or yellowing of the skin and eyes along with itching. Please follow up with your primary care doctor, your hematologist Dr. Jacky Guo and hepatology.      SECONDARY DISCHARGE DIAGNOSES  Diagnosis: Pruritus  Assessment and Plan of Treatment: You have been developing more itchiness, likely in the setting of the increased bilirubin levels in your blood. This lab can contribute to itchiness. We prescribed a medicine called ursodiol which should help with the itchiness. If this is not enough, it is okay to take a Cetirizine (Zyrtec) over the counter. Please follow up with the hepatology team regarding the itchiness.     PRINCIPAL DISCHARGE DIAGNOSIS  Diagnosis: Hyperbilirubinemia  Assessment and Plan of Treatment: You came to the hospital because one of the labs, the bilirubin, was elevated. It is still unclear what caused this elevation but likely due to increase in blood cell breakdown. When a blood cell breaksdown, the hemoglobin in the blood cell that carries oxygen metabolizes into bilirubin. A higher amount of breakdown leads to increased bilirubin, which makes your skin and eyes more yellow. You may also have a condition known as Gilbert's Syndrome where the liver is unable to metabolize the bilirubin as well. Both possibilities lead to the build up of bilirubin which leads to jaundice or yellowing of the skin and eyes along with itching. Please follow up with your primary care doctor, your hematologist Dr. Jacky Guo and hepatology.      SECONDARY DISCHARGE DIAGNOSES  Diagnosis: Pruritus  Assessment and Plan of Treatment: You have been developing more itchiness, likely in the setting of the increased bilirubin levels in your blood. This lab can contribute to itchiness. We prescribed a medicine called ursodiol which should help with the itchiness. If this is not enough, it is okay to take a Cetirizine (Zyrtec) over the counter. Please follow up with the hepatology team regarding the itchiness.

## 2024-02-28 NOTE — DISCHARGE NOTE PROVIDER - NSDCFUADDAPPT_GEN_ALL_CORE_FT
APPTS ARE READY TO BE MADE: [ X] YES    Best Family or Patient Contact (if needed):    Additional Information about above appointments (if needed):    1: Hematology with Dr. Jacky Guo within 1 week of discharge  2: Gastroenterology   3: Hepatology within 1 week of discharge  4: Primary Care Provider    Other comments or requests:    APPTS ARE READY TO BE MADE: [ X] YES    Best Family or Patient Contact (if needed):    Additional Information about above appointments (if needed):    1: Hematology with Dr. Jacky Guo within 1 week of discharge  2: Gastroenterology   3: Hepatology within 1 week of discharge  4: Primary Care Provider    Other comments or requests:     Prior to outreaching the patient, it was visible that the patient has secured a follow up appointment which was not scheduled by our team. Patient is scheduled to see Dr. Guo on 3/6 at [address] [place] [address]    Prior to outreaching the patient, it was visible that the patient has secured a follow up appointment which was not scheduled by our team. Patient is scheduled to see Dr. Barnett on 3/12 at [address] [place] [address]

## 2024-02-28 NOTE — DISCHARGE NOTE NURSING/CASE MANAGEMENT/SOCIAL WORK - NSDCPEFALRISK_GEN_ALL_CORE
For information on Fall & Injury Prevention, visit: [email or web address] OR  [email or web address] OR  https://www.cdc.gov/steadi/patient.html

## 2024-02-28 NOTE — PROGRESS NOTE ADULT - SUBJECTIVE AND OBJECTIVE BOX
DATE OF SERVICE: 02-28-24 @ 07:21    Patient is a 63y old  Female who presents with a chief complaint of jaundice and hyperbilirubinemia (27 Feb 2024 14:19)      SUBJECTIVE / OVERNIGHT EVENTS: NAEO. Patient received benadryl 25 mg PO for pruritis.     MEDICATIONS  (STANDING):  calcium acetate 667 milliGRAM(s) Oral three times a day with meals  carvedilol 3.125 milliGRAM(s) Oral every 12 hours  chlorhexidine 2% Cloths 1 Application(s) Topical daily  famotidine    Tablet 20 milliGRAM(s) Oral daily  influenza   Vaccine 0.5 milliLiter(s) IntraMuscular once  multivitamin 1 Tablet(s) Oral daily  ursodiol Capsule 300 milliGRAM(s) Oral two times a day    MEDICATIONS  (PRN):  sodium chloride 0.9% Bolus. 100 milliLiter(s) IV Bolus every 5 minutes PRN SBP LESS THAN or EQUAL to 90 mmHg      Vital Signs Last 24 Hrs  T(C): 37.2 (28 Feb 2024 04:00), Max: 37.2 (28 Feb 2024 04:00)  T(F): 98.9 (28 Feb 2024 04:00), Max: 98.9 (28 Feb 2024 04:00)  HR: 84 (28 Feb 2024 04:00) (80 - 92)  BP: 134/74 (28 Feb 2024 04:00) (126/77 - 144/81)  BP(mean): 94 (27 Feb 2024 11:09) (94 - 94)  RR: 18 (28 Feb 2024 04:00) (17 - 18)  SpO2: 96% (28 Feb 2024 04:00) (95% - 98%)    Parameters below as of 28 Feb 2024 04:00  Patient On (Oxygen Delivery Method): room air      CAPILLARY BLOOD GLUCOSE        I&O's Summary      PHYSICAL EXAM:  GENERAL: NAD, well-developed  HEAD:  Atraumatic, Normocephalic  EYES: EOMI, PERRLA, conjunctiva and sclera clear  NECK: Supple, No JVD  CHEST/LUNG: Clear to auscultation bilaterally; No wheeze  HEART: Regular rate and rhythm; No murmurs, rubs, or gallops  ABDOMEN: Soft, Nontender, Nondistended; Bowel sounds present  EXTREMITIES:  2+ Peripheral Pulses, No clubbing, cyanosis, or edema  PSYCH: AAOx3  NEUROLOGY: non-focal  SKIN: No rashes or lesions    LABS:                        9.9    6.42  )-----------( 93       ( 28 Feb 2024 07:00 )             34.3     02-27    134<L>  |  94<L>  |  19  ----------------------------<  101<H>  4.5   |  27  |  4.48<H>    Ca    8.9      27 Feb 2024 10:49  Phos  5.3     02-27  Mg     2.2     02-27    TPro  7.0  /  Alb  3.8  /  TBili  4.0<H>  /  DBili  0.4<H>  /  AST  35  /  ALT  18  /  AlkPhos  92  02-27          Urinalysis Basic - ( 27 Feb 2024 10:49 )    Color: x / Appearance: x / SG: x / pH: x  Gluc: 101 mg/dL / Ketone: x  / Bili: x / Urobili: x   Blood: x / Protein: x / Nitrite: x   Leuk Esterase: x / RBC: x / WBC x   Sq Epi: x / Non Sq Epi: x / Bacteria: x        RADIOLOGY & ADDITIONAL TESTS:    Imaging Personally Reviewed:    Consultant(s) Notes Reviewed:      Care Discussed with Consultants/Other Providers:   DATE OF SERVICE: 02-28-24 @ 07:21    Patient is a 63y old  Female who presents with a chief complaint of jaundice and hyperbilirubinemia (27 Feb 2024 14:19)      SUBJECTIVE / OVERNIGHT EVENTS: NAEO. Patient received benadryl 25 mg PO for pruritis.     MEDICATIONS  (STANDING):  calcium acetate 667 milliGRAM(s) Oral three times a day with meals  carvedilol 3.125 milliGRAM(s) Oral every 12 hours  chlorhexidine 2% Cloths 1 Application(s) Topical daily  famotidine    Tablet 20 milliGRAM(s) Oral daily  influenza   Vaccine 0.5 milliLiter(s) IntraMuscular once  multivitamin 1 Tablet(s) Oral daily  ursodiol Capsule 300 milliGRAM(s) Oral two times a day    MEDICATIONS  (PRN):  sodium chloride 0.9% Bolus. 100 milliLiter(s) IV Bolus every 5 minutes PRN SBP LESS THAN or EQUAL to 90 mmHg      Vital Signs Last 24 Hrs  T(C): 37.2 (28 Feb 2024 04:00), Max: 37.2 (28 Feb 2024 04:00)  T(F): 98.9 (28 Feb 2024 04:00), Max: 98.9 (28 Feb 2024 04:00)  HR: 84 (28 Feb 2024 04:00) (80 - 92)  BP: 134/74 (28 Feb 2024 04:00) (126/77 - 144/81)  BP(mean): 94 (27 Feb 2024 11:09) (94 - 94)  RR: 18 (28 Feb 2024 04:00) (17 - 18)  SpO2: 96% (28 Feb 2024 04:00) (95% - 98%)    Parameters below as of 28 Feb 2024 04:00  Patient On (Oxygen Delivery Method): room air      CAPILLARY BLOOD GLUCOSE        I&O's Summary      PHYSICAL EXAM:  GENERAL: NAD, lying in bed comfortably, jaundiced  HEAD:  Atraumatic, Normocephalic  EYES: EOMI, conjunctiva and sclera clear  ENT: Moist mucous membranes  CHEST/LUNG: Clear to auscultation bilaterally; No rales, rhonchi, wheezing, or rubs. Unlabored respirations  HEART: Regular rate and rhythm; No murmurs, rubs, or gallops  ABDOMEN: BSx4; hepatosplenomegaly present, nontender, nondistended  EXTREMITIES:  2+ Peripheral Pulses, brisk capillary refill. No clubbing, cyanosis, or edema  NERVOUS SYSTEM:  A&Ox3, no focal deficits   SKIN: No rashes or lesions  LABS:                        9.9    6.42  )-----------( 93       ( 28 Feb 2024 07:00 )             34.3     02-27    134<L>  |  94<L>  |  19  ----------------------------<  101<H>  4.5   |  27  |  4.48<H>    Ca    8.9      27 Feb 2024 10:49  Phos  5.3     02-27  Mg     2.2     02-27    TPro  7.0  /  Alb  3.8  /  TBili  4.0<H>  /  DBili  0.4<H>  /  AST  35  /  ALT  18  /  AlkPhos  92  02-27          Urinalysis Basic - ( 27 Feb 2024 10:49 )    Color: x / Appearance: x / SG: x / pH: x  Gluc: 101 mg/dL / Ketone: x  / Bili: x / Urobili: x   Blood: x / Protein: x / Nitrite: x   Leuk Esterase: x / RBC: x / WBC x   Sq Epi: x / Non Sq Epi: x / Bacteria: x        RADIOLOGY & ADDITIONAL TESTS:    Imaging Personally Reviewed:    Consultant(s) Notes Reviewed:      Care Discussed with Consultants/Other Providers:

## 2024-02-28 NOTE — PROGRESS NOTE ADULT - ASSESSMENT
63F with myelofibrosis, polycythemia vera, ESRD, portal HTN w/ gastric varices, admitted for elevated indirect bilirubin.

## 2024-02-28 NOTE — DISCHARGE NOTE NURSING/CASE MANAGEMENT/SOCIAL WORK - PATIENT PORTAL LINK FT
You can access the FollowMyHealth Patient Portal offered by [facility] by registering at the following website: [email or web address]. By joining [facility]’s FollowMyHealth portal, you will also be able to view your health information using other applications (apps) compatible with our system.

## 2024-02-28 NOTE — DISCHARGE NOTE PROVIDER - CARE PROVIDER_API CALL
Jacky Guo  Internal Medicine  [address] [place] [id number]  Phone: [phone]  Fax: [phone]  Established Patient  Follow Up Time:

## 2024-02-28 NOTE — PROGRESS NOTE ADULT - PROBLEM SELECTOR PLAN 1
Patient presenting with unconjugated bilirubinemia    Unconjugated bilirubinemia differential:  Increased bilirubin production -> hemolysis, bleeding, hematoma, transfusions  Impaired bilirubin uptake -> normally drug induced, poor hepatic perfusion (CHF, portosystemic shunts/thrombosis)  Impaired bilirubin conjugation -> Gilbert, Criggler najjar, certain drugs    Plan:  -f/u hemolysis labs (LDH, hapto, retic)  -heme consult regarding treatment  -hepatology consult in AM Patient presenting with unconjugated bilirubinemia. Likely Gilbert vs medication induced hemolysis.   Hemolysis labs show mild hemolysis. Direct Any negative    Plan:  -patient to be discharged with outpatient hematology and hepatology follow up

## 2024-02-28 NOTE — DISCHARGE NOTE PROVIDER - NSDCCPTREATMENT_GEN_ALL_CORE_FT
PRINCIPAL PROCEDURE  Procedure: XR chest AP  Findings and Treatment: FINDINGS:  Right chest Mediport with tip at the upper right atrium.  Left brachiocephalic/subclavian as well as left upper extremity stents.   Left upper extremity surgical clips.  The heart is slightly enlarged.  The lungs are clear.  There is no pneumothorax or pleural effusion.  IMPRESSION:  Right chest Mediport with tip at the upper right atrium.

## 2024-02-28 NOTE — PROGRESS NOTE ADULT - PROBLEM SELECTOR PLAN 2
Gastric varices present prior to admission    Plan:  -c/w coreg 3.125 BID, can go up to 6.25 BID if needed Gastric varices present prior to admission    Plan:  -c/w coreg 3.125 BID

## 2024-02-28 NOTE — DISCHARGE NOTE PROVIDER - HOSPITAL COURSE
HPI:  63F with myelofibrosis, polycythemia vera, ESRD, portal HTN w/ gastric varices, sent in from dialysis clinic due to elevated indirect bilirubin. Patient has been experiencing pruitus for the past month which was worse while in dialysis on 02/26 which prompted staff to check liver labs. She was previously on Ojjara but discontinued it due to diarrhea. She has baseline anemia and receives transfusions every 2-3 months through her mediport.     Otherwise patient has been experiencing nausea and daily bilious emesis in the mornings. Negative abdominal pain, melena or hematochezia but patient has been having fecal incontinence the past month.     In the ED, VSS. Bilirubin at 3.9 with indirect bilirubin at 3.5. LDH at 448. Rest of hemolysis labs pending. Patient received zofran and benadryl and admitted to medicine for further management. (27 Feb 2024 07:20)    Hospital Course:  Hemolysis labs were obtained and showed low grade hemolysis. Hematology consulted.  Patient endorsed jaundice her entire life but never recorded as high. On 02/28, no further work up and medication changes to be done in patient and patient to follow up on hematology and hepatology.     Important Medication Changes and Reason:    Active or Pending Issues Requiring Follow-up:    Advanced Directives:   [ ] Full code  [ ] DNR  [ ] Hospice    Discharge Diagnoses:         HPI:  63F with myelofibrosis, polycythemia vera, ESRD, portal HTN w/ gastric varices, sent in from dialysis clinic due to elevated indirect bilirubin. Patient has been experiencing pruitus for the past month which was worse while in dialysis on 02/26 which prompted staff to check liver labs. She was previously on Ojjara but discontinued it due to diarrhea. She has baseline anemia and receives transfusions every 2-3 months through her mediport.     Otherwise patient has been experiencing nausea and daily bilious emesis in the mornings. Negative abdominal pain, melena or hematochezia but patient has been having fecal incontinence the past month.     In the ED, VSS. Bilirubin at 3.9 with indirect bilirubin at 3.5. LDH at 448. Rest of hemolysis labs pending. Patient received zofran and benadryl and admitted to medicine for further management. (27 Feb 2024 07:20)    Hospital Course:  Hemolysis labs were obtained and showed low grade hemolysis. Hematology consulted.  Patient endorsed jaundice her entire life but never recorded as high. On 02/28, no further work up and medication changes to be done in patient and patient to follow up on hematology and hepatology.     Important Medication Changes and Reason:  None    Active or Pending Issues Requiring Follow-up:  [ ] Hepatology Follow Up  [ ] Hematology Follow Up with Dr. Jacky Guo    Advanced Directives:   [ ] Full code  [ ] DNR  [ ] Hospice    Discharge Diagnoses:  Hemolytic Anemia       HPI:  63F with myelofibrosis, polycythemia vera, ESRD, portal HTN w/ gastric varices, sent in from dialysis clinic due to elevated indirect bilirubin. Patient has been experiencing pruitus for the past month which was worse while in dialysis on 02/26 which prompted staff to check liver labs. She was previously on Ojjara but discontinued it due to diarrhea. She has baseline anemia and receives transfusions every 2-3 months through her mediport.     Otherwise patient has been experiencing nausea and daily bilious emesis in the mornings. Negative abdominal pain, melena or hematochezia but patient has been having fecal incontinence the past month.     In the ED, VSS. Bilirubin at 3.9 with indirect bilirubin at 3.5. LDH at 448. Rest of hemolysis labs pending. Patient received zofran and benadryl and admitted to medicine for further management. (27 Feb 2024 07:20)    Hospital Course:  Hemolysis labs were obtained and showed low grade hemolysis. Hematology consulted. Deemed possible reaction to Aojjara. Patient endorsed jaundice her entire life but never recorded as high. On 02/28, no further work up and medication changes to be done in patient and patient to follow up on hematology and hepatology.     Important Medication Changes and Reason:  None    Active or Pending Issues Requiring Follow-up:  [ ] Hepatology Follow Up  [ ] Hematology Follow Up with Dr. Jacky Guo      Discharge Diagnoses:  Hemolytic Anemia

## 2024-02-28 NOTE — DISCHARGE NOTE NURSING/CASE MANAGEMENT/SOCIAL WORK - NSDCVIVACCINE_GEN_ALL_CORE_FT
influenza, injectable, quadrivalent, preservative free; 20-Nov-2014 12:44; Aston Ulloa (RN); [employer]; [id number]; IntraMuscular; Deltoid Left.; 0.5 milliLiter(s);   influenza, injectable, quadrivalent, preservative free; 05-Oct-2016 18:15; Rosario James (RN); [employer]; [id number]; IntraMuscular; Deltoid Left.; 0.5 milliLiter(s); VIS (VIS Published: 07-Aug-2015, VIS Presented: 05-Oct-2016);

## 2024-02-28 NOTE — PROGRESS NOTE ADULT - ATTENDING COMMENTS
63F PMH myelofibrosis, polycythemia vera, ESRD on HD, portal HTN with gastric varices, admitted for elevated indirect bilirubin    #Bilirubinemia   Hemolysis labs elevated, likely secondary to medication, though no clear cause of current condition identified.   - Likely outpatient follow up   - Given history (recent stressor, chronic jaundice), suspect underlying Gilbert syndrome    Remainder as above  Likely for discharge today

## 2024-02-29 ENCOUNTER — APPOINTMENT (OUTPATIENT)
Dept: GASTROENTEROLOGY | Facility: CLINIC | Age: 64
End: 2024-02-29

## 2024-03-01 NOTE — ED ADULT NURSE NOTE - NS ED NURSE LEVEL OF CONSCIOUSNESS AFFECT
Education Record     Learner:  Patient     Disease / Diagnosis: IVIG infusion      Barriers / Limitations:                  Comments:none     Method:  Discussion                Comments:     General Topics:  Plan of care reviewed                Comments:     Outcome:  Shows understanding                Comments:  Pt tolerated IVIG infusion w/out complications. Pt dc home ambulatory in stable condition, no complaints    
Calm

## 2024-03-01 NOTE — CONSULT NOTE ADULT - CONSULT REQUESTED DATE/TIME
LABS:  Per U mL 02/29/2024  Ferritin 193.6  Folate level 4.9  Iron 77 TIBC 286% of iron 27 TIBC 209   Vitamin B12 355   WBC 3.6 hemoglobin 13.1 hematocrit 38.0 platelet count 128      No New Orders  
27-Feb-2024 10:45
27-Feb-2024 14:20

## 2024-03-06 ENCOUNTER — LABORATORY RESULT (OUTPATIENT)
Age: 64
End: 2024-03-06

## 2024-03-06 ENCOUNTER — RESULT REVIEW (OUTPATIENT)
Age: 64
End: 2024-03-06

## 2024-03-06 ENCOUNTER — APPOINTMENT (OUTPATIENT)
Dept: HEMATOLOGY ONCOLOGY | Facility: CLINIC | Age: 64
End: 2024-03-06
Payer: COMMERCIAL

## 2024-03-06 VITALS
OXYGEN SATURATION: 99 % | RESPIRATION RATE: 16 BRPM | WEIGHT: 115.83 LBS | HEART RATE: 87 BPM | BODY MASS INDEX: 21.32 KG/M2 | HEIGHT: 62 IN | SYSTOLIC BLOOD PRESSURE: 137 MMHG | TEMPERATURE: 98.2 F | DIASTOLIC BLOOD PRESSURE: 86 MMHG

## 2024-03-06 DIAGNOSIS — D45 POLYCYTHEMIA VERA: ICD-10-CM

## 2024-03-06 DIAGNOSIS — D75.81 MYELOFIBROSIS: ICD-10-CM

## 2024-03-06 LAB
ANISOCYTOSIS BLD QL: SLIGHT — SIGNIFICANT CHANGE UP
BASOPHILS # BLD AUTO: 0.07 K/UL — SIGNIFICANT CHANGE UP (ref 0–0.2)
BASOPHILS NFR BLD AUTO: 1 % — SIGNIFICANT CHANGE UP (ref 0–2)
DACRYOCYTES BLD QL SMEAR: SLIGHT — SIGNIFICANT CHANGE UP
ELLIPTOCYTES BLD QL SMEAR: SLIGHT — SIGNIFICANT CHANGE UP
EOSINOPHIL # BLD AUTO: 0.07 K/UL — SIGNIFICANT CHANGE UP (ref 0–0.5)
EOSINOPHIL NFR BLD AUTO: 1 % — SIGNIFICANT CHANGE UP (ref 0–6)
HCT VFR BLD CALC: 34.4 % — LOW (ref 34.5–45)
HGB BLD-MCNC: 10.1 G/DL — LOW (ref 11.5–15.5)
LYMPHOCYTES # BLD AUTO: 0.48 K/UL — LOW (ref 1–3.3)
LYMPHOCYTES # BLD AUTO: 7 % — LOW (ref 13–44)
MACROCYTES BLD QL: SLIGHT — SIGNIFICANT CHANGE UP
MCHC RBC-ENTMCNC: 29.4 G/DL — LOW (ref 32–36)
MCHC RBC-ENTMCNC: 29.7 PG — SIGNIFICANT CHANGE UP (ref 27–34)
MCV RBC AUTO: 101.2 FL — HIGH (ref 80–100)
METAMYELOCYTES # FLD: 1 % — HIGH (ref 0–0)
MONOCYTES # BLD AUTO: 0.27 K/UL — SIGNIFICANT CHANGE UP (ref 0–0.9)
MONOCYTES NFR BLD AUTO: 4 % — SIGNIFICANT CHANGE UP (ref 2–14)
NEUTROPHILS # BLD AUTO: 5.87 K/UL — SIGNIFICANT CHANGE UP (ref 1.8–7.4)
NEUTROPHILS NFR BLD AUTO: 86 % — HIGH (ref 43–77)
NRBC # BLD: 1 /100 WBCS — HIGH (ref 0–0)
NRBC # BLD: SIGNIFICANT CHANGE UP /100 WBCS (ref 0–0)
PLAT MORPH BLD: NORMAL — SIGNIFICANT CHANGE UP
PLATELET # BLD AUTO: 123 K/UL — LOW (ref 150–400)
POIKILOCYTOSIS BLD QL AUTO: SIGNIFICANT CHANGE UP
POLYCHROMASIA BLD QL SMEAR: SLIGHT — SIGNIFICANT CHANGE UP
RBC # BLD: 3.4 M/UL — LOW (ref 3.8–5.2)
RBC # FLD: 20.3 % — HIGH (ref 10.3–14.5)
RBC BLD AUTO: ABNORMAL
RETICS #: 167.9 K/UL — HIGH (ref 25–125)
RETICS/RBC NFR: 4.8 % — HIGH (ref 0.5–2.5)
SCHISTOCYTES BLD QL AUTO: SLIGHT — SIGNIFICANT CHANGE UP
WBC # BLD: 6.83 K/UL — SIGNIFICANT CHANGE UP (ref 3.8–10.5)
WBC # FLD AUTO: 6.83 K/UL — SIGNIFICANT CHANGE UP (ref 3.8–10.5)

## 2024-03-06 PROCEDURE — 99214 OFFICE O/P EST MOD 30 MIN: CPT

## 2024-03-06 RX ORDER — CALCIUM ACETATE 667 MG
2 TABLET ORAL
Refills: 0 | DISCHARGE

## 2024-03-06 RX ORDER — MOMELOTINIB 150 MG/1
1 TABLET ORAL
Refills: 0 | DISCHARGE

## 2024-03-06 RX ORDER — CARVEDILOL 3.12 MG/1
3.12 TABLET, FILM COATED ORAL TWICE DAILY
Refills: 0 | Status: ACTIVE | COMMUNITY

## 2024-03-06 RX ORDER — HYDROXYUREA 500 MG/1
500 CAPSULE ORAL
Refills: 0 | DISCHARGE

## 2024-03-06 RX ORDER — HYDROXYZINE HYDROCHLORIDE 25 MG/1
25 TABLET ORAL
Qty: 30 | Refills: 1 | Status: ACTIVE | COMMUNITY
Start: 2024-03-06 | End: 1900-01-01

## 2024-03-06 RX ORDER — CARVEDILOL PHOSPHATE 80 MG/1
1 CAPSULE, EXTENDED RELEASE ORAL
Refills: 0 | DISCHARGE

## 2024-03-06 NOTE — PHYSICAL EXAM
[Normal] : full range of motion and no deformities appreciated [de-identified] : blood in right ear canal from use of cotton swab in canal [de-identified] : Hepatosplenomegaly [de-identified] : No cervical, axillary or inguinal LAD noted [de-identified] : No visible rash

## 2024-03-06 NOTE — REASON FOR VISIT
[Follow-Up Visit] : a follow-up visit for [Spouse] : spouse [FreeTextEntry2] : Jak2+ PV, secondary myelofibrosis

## 2024-03-06 NOTE — REVIEW OF SYSTEMS
[Negative] : Allergic/Immunologic [Fatigue] : fatigue [Recent Change In Weight] : ~T recent weight change [FreeTextEntry7] : diarrhea [de-identified] : itching

## 2024-03-06 NOTE — HISTORY OF PRESENT ILLNESS
[de-identified] : Follow-up Visit on 3/6/2024: [facility] # [id number] Patient here for follow-up for Jak2+ PV, secondary myelofibrosis Past medical history also significant for ESRD on HD (Mon/Wed/Fri), HTN, portal HTN with gastric varices and splenomegaly  Patient was initially on Jakafi 15mg po TIW.  Jakafi dose was decreased to 10mg TIW due to anemia (that was likely from lack of Danazol) Jakafi then was increased to 20mg TIW.  Jakafi was discontinued to start Ojjaara (momelotinib).  Ojjaara 200mg po daily was started in 12/2023- HGB nazia to 9.8 and Danazol was discontinued.  Hydrea was restarted at 500mg po TIW after HD b/o painful splenomegaly after starting Ojjaara.  Patient discontinued Ojjaara on 1/18/2024 d/t watery diarrhea. Diarrhea improved over a two-week period of time- went from watery to gel consistency.  Restarted Ojjaara on 2/4/2024 and watery diarrhea returned. Having BM up to 6 times per day. Discontinued Ojjaara after one dose.  Hospitalized at [facility] 2/27-2/28/2024.  Found to have elevated indirect bilirubin and pruritus.  Seen by Heme service in patient- thought to be low level hemolysis causing elevated bilirubin but some level of elevated bilirubin preceded starting Ojjaara.  Discharged to home to follow-up with heme and hepatology.   Patient currently on Ojjaara 150mg (dose reduced) po daily and Hydrea 500mg po TIW after HD.  Patient tolerating medication ok.  She still c/o some loose stools- BM up to 3 times per day but better overall.  She c/o abdominal fullness and early satiety.  She denies abdominal pain.  Denies bloody/black stools.  Has occasional nausea, dry heaving.  Vomiting overall has improved.  Has appt with GI- Dr. Charles on 3/12/2024.  She c/o fatigue, 10-12-pound weight loss over the past several months.  Denies fever/chills.  Has pruritus without distinct rash.  Tried po Benadryl without improvement. C/o occasional chest tightness.  Denies SOB, cough.  Patient reports prior cough resolved.  She never saw pulmonology.

## 2024-03-06 NOTE — ASSESSMENT
[FreeTextEntry1] : This is a 63 woman with a history of Polycythemia Vera, MAK 2 + since 2012. 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   Jakafi was initially started at 15mg TIW. Dose was decreased to 10mg TIW due to anemia though that was likely form the lack of danazol. Since then dose was adjusted back to 15mg then to 20mg TIW.  Patient was taken off Jakafi with intent to switch to Momelotinib (Ojjaara) 200mg daily in setting of dialysis.  A cough developed so we asked patient to hold off on the start for this.  Patient ended up being admitted for progression of the abdominal pain from splenomegaly.   Patient was started on the Ojjaara after notification of the splenomegaly, had only been on this for 3-4 days when Hg nazia to 9.8 fairly rapidly.  Suspect this was from a combination of halting the Jakafi as well as starting the Ojjaara.  Given the rapid rise, recommended patient stop the Danazol such that we do not cause a hypercoagulable state with too high of a hg.   Can always restart the Danazol again if needed.   Patient restarted Ojjaara 150mg (dose reduced) with Hydrea 500mg po TIW.   Recent hospitalization for elevated indirect bilirubin likely d/t low level hemolysis.   Plan: Continue Ojjara 150mg po daily Increase Hydrea to 500mg po daily Continue to stay OFF the Danazol.  Atarax PRN pruritus.  Will check CBC, CMP, LDH, haptoglobin, reticulocyte count, indirect bilirubin, Any.  Recommend follow-up with primary care physician for chest discomfort.  Follow-up with GI as scheduled on 3/12/2024 as scheduled.  Return visit in 1 month with Dr. Guo.  Patient seen and examined with Dr. Jacky Guo.  Case and management discussed with Dr. Jacky Guo.

## 2024-03-07 DIAGNOSIS — N18.6 END STAGE RENAL DISEASE: ICD-10-CM

## 2024-03-07 DIAGNOSIS — R11.0 NAUSEA: ICD-10-CM

## 2024-03-07 DIAGNOSIS — I12.0 HYPERTENSIVE CHRONIC KIDNEY DISEASE WITH STAGE 5 CHRONIC KIDNEY DISEASE OR END STAGE RENAL DISEASE: ICD-10-CM

## 2024-03-07 DIAGNOSIS — Z99.2 DEPENDENCE ON RENAL DIALYSIS: ICD-10-CM

## 2024-03-07 DIAGNOSIS — Z87.19 PERSONAL HISTORY OF OTHER DISEASES OF THE DIGESTIVE SYSTEM: ICD-10-CM

## 2024-03-07 DIAGNOSIS — E80.7 DISORDER OF BILIRUBIN METABOLISM, UNSPECIFIED: ICD-10-CM

## 2024-03-07 DIAGNOSIS — R17 UNSPECIFIED JAUNDICE: ICD-10-CM

## 2024-03-07 NOTE — H&P ADULT - NSICDXFAMILYHX_GEN_ALL_CORE_FT
Detail Level: Detailed FAMILY HISTORY:  Family history of hypertension in mother    Sibling  Still living? Unknown  FH: cirrhosis, Age at diagnosis: Age Unknown    Grandparent  Still living? No  Family history of malignant neoplasm, Age at diagnosis: Age Unknown

## 2024-03-12 ENCOUNTER — OUTPATIENT (OUTPATIENT)
Dept: OUTPATIENT SERVICES | Facility: HOSPITAL | Age: 64
LOS: 1 days | End: 2024-03-12
Payer: SELF-PAY

## 2024-03-12 ENCOUNTER — APPOINTMENT (OUTPATIENT)
Dept: GASTROENTEROLOGY | Facility: HOSPITAL | Age: 64
End: 2024-03-12
Payer: COMMERCIAL

## 2024-03-12 VITALS
HEIGHT: 62 IN | RESPIRATION RATE: 14 BRPM | SYSTOLIC BLOOD PRESSURE: 151 MMHG | WEIGHT: 119.05 LBS | DIASTOLIC BLOOD PRESSURE: 84 MMHG | BODY MASS INDEX: 21.91 KG/M2 | HEART RATE: 88 BPM | TEMPERATURE: 98 F

## 2024-03-12 DIAGNOSIS — R10.9 UNSPECIFIED ABDOMINAL PAIN: ICD-10-CM

## 2024-03-12 DIAGNOSIS — T85.898A OTHER SPECIFIED COMPLICATION OF OTHER INTERNAL PROSTHETIC DEVICES, IMPLANTS AND GRAFTS, INITIAL ENCOUNTER: Chronic | ICD-10-CM

## 2024-03-12 DIAGNOSIS — K21.9 GASTRO-ESOPHAGEAL REFLUX DISEASE W/OUT ESOPHAGITIS: ICD-10-CM

## 2024-03-12 DIAGNOSIS — Z99.2 DEPENDENCE ON RENAL DIALYSIS: Chronic | ICD-10-CM

## 2024-03-12 DIAGNOSIS — R19.7 DIARRHEA, UNSPECIFIED: ICD-10-CM

## 2024-03-12 DIAGNOSIS — I77.0 ARTERIOVENOUS FISTULA, ACQUIRED: Chronic | ICD-10-CM

## 2024-03-12 PROCEDURE — G0463: CPT

## 2024-03-12 PROCEDURE — ZZZZZ: CPT | Mod: GC

## 2024-03-12 RX ORDER — OMEPRAZOLE 20 MG/1
20 CAPSULE, DELAYED RELEASE ORAL DAILY
Qty: 90 | Refills: 1 | Status: ACTIVE | COMMUNITY
Start: 2024-03-12 | End: 1900-01-01

## 2024-03-13 DIAGNOSIS — I86.4 GASTRIC VARICES: ICD-10-CM

## 2024-03-13 DIAGNOSIS — R11.0 NAUSEA: ICD-10-CM

## 2024-03-13 DIAGNOSIS — K21.9 GASTRO-ESOPHAGEAL REFLUX DISEASE WITHOUT ESOPHAGITIS: ICD-10-CM

## 2024-03-13 DIAGNOSIS — R19.7 DIARRHEA, UNSPECIFIED: ICD-10-CM

## 2024-03-13 NOTE — END OF VISIT
[FreeTextEntry3] : Here for f/u visit.  ?scheduling issues for egd/colon after last visit in Jan 2024  Reporting nausea and heartburn now   Start trial of PPI  schedule egd/colon as previously recommended   RTC after egd/colon  [] : Fellow

## 2024-03-13 NOTE — HISTORY OF PRESENT ILLNESS
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   Was previously seen in hepatology clinic w/ Dr. Hernandez. At that time was following up after hospitalization for diarrhea. 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 Last colonoscopy was 2016 w/ 4mm sigmoid polyp (path w/ TA). At last visit her coreg was increased from 3.125 BID for 6.24 BID. Plan was also for EGD for surveillance of gastric varices and colonoscopy for colon cancer screening. Has not had done as of yet.   Since then has been following w/ heme onc and reporting severe nausea and diarrhea. Had another hospitalization 2/2024 for elevated bilirubin, 2/2 hemolysis. Thought to be 2/2 Aojjara. Her meds were altered to help with diarrhea but she is still reporting several episodes/day. In terms of nausea, reports she often gags in the morning but does not actually vomit. Able to keep food down but has to take smaller bites and eat smaller meals. Sxs are occurring daily without periods of normalcy in between. She feels she has associated heart burn. Is on zofarn and pepcid.

## 2024-03-13 NOTE — ASSESSMENT
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   #Diarrhea #N/V #Gastric varices Admission recently for diarrhea w/ neg infectious w/u. Also w/ daily nausea w/o vomiting that has been unresponsive to zofran and pepcid. Ddx includes GOO vs. functional disorder vs. med SE. EGD already indicated for screening of gastric varices.  It appears she was already scheduled to undergo EGD/Colonoscopy but has had trouble scheduling it.  -Will schedule for EGD/colonoscopy to evaluate gastric varices, r/o GOO, eval for microscopic colitis and for screening colon. Is on sliding scale -Golytely sent to pharmacy  -CLD day prior to procedure -Will start omeprazole for persistent N/V given not responsive to famotidine  [facility] GI/Hep fellow  Discussed with Dr. Anna

## 2024-03-18 ENCOUNTER — INPATIENT (INPATIENT)
Facility: HOSPITAL | Age: 64
LOS: 8 days | Discharge: HOME | DRG: 871 | End: 2024-03-27
Attending: INTERNAL MEDICINE | Admitting: INTERNAL MEDICINE
Payer: MEDICAID

## 2024-03-18 VITALS
WEIGHT: 119.05 LBS | SYSTOLIC BLOOD PRESSURE: 208 MMHG | RESPIRATION RATE: 28 BRPM | TEMPERATURE: 97 F | HEART RATE: 114 BPM | HEIGHT: 62 IN | DIASTOLIC BLOOD PRESSURE: 125 MMHG | OXYGEN SATURATION: 94 %

## 2024-03-18 DIAGNOSIS — I77.0 ARTERIOVENOUS FISTULA, ACQUIRED: Chronic | ICD-10-CM

## 2024-03-18 DIAGNOSIS — N18.6 END STAGE RENAL DISEASE: ICD-10-CM

## 2024-03-18 DIAGNOSIS — Z99.2 DEPENDENCE ON RENAL DIALYSIS: Chronic | ICD-10-CM

## 2024-03-18 DIAGNOSIS — J81.0 ACUTE PULMONARY EDEMA: ICD-10-CM

## 2024-03-18 LAB
ADD ON TEST-SPECIMEN IN LAB: SIGNIFICANT CHANGE UP
ALBUMIN SERPL ELPH-MCNC: 3.9 G/DL — SIGNIFICANT CHANGE UP (ref 3.3–5)
ALBUMIN SERPL ELPH-MCNC: 4.4 G/DL — SIGNIFICANT CHANGE UP (ref 3.3–5)
ALBUMIN SERPL ELPH-MCNC: 4.6 G/DL — SIGNIFICANT CHANGE UP (ref 3.3–5)
ALP SERPL-CCNC: 111 U/L — SIGNIFICANT CHANGE UP (ref 40–120)
ALP SERPL-CCNC: 112 U/L — SIGNIFICANT CHANGE UP (ref 40–120)
ALP SERPL-CCNC: 98 U/L — SIGNIFICANT CHANGE UP (ref 40–120)
ALT FLD-CCNC: 12 U/L — SIGNIFICANT CHANGE UP (ref 10–45)
ALT FLD-CCNC: 14 U/L — SIGNIFICANT CHANGE UP (ref 10–45)
ALT FLD-CCNC: 15 U/L — SIGNIFICANT CHANGE UP (ref 10–45)
ANION GAP SERPL CALC-SCNC: 16 MMOL/L — SIGNIFICANT CHANGE UP (ref 5–17)
ANION GAP SERPL CALC-SCNC: 19 MMOL/L — HIGH (ref 5–17)
ANION GAP SERPL CALC-SCNC: 19 MMOL/L — HIGH (ref 5–17)
ANISOCYTOSIS BLD QL: SIGNIFICANT CHANGE UP
APPEARANCE UR: CLEAR — SIGNIFICANT CHANGE UP
APTT BLD: 37.4 SEC — HIGH (ref 24.5–35.6)
APTT BLD: 39.9 SEC — HIGH (ref 24.5–35.6)
AST SERPL-CCNC: 20 U/L — SIGNIFICANT CHANGE UP (ref 10–40)
AST SERPL-CCNC: 20 U/L — SIGNIFICANT CHANGE UP (ref 10–40)
AST SERPL-CCNC: 28 U/L — SIGNIFICANT CHANGE UP (ref 10–40)
BACTERIA # UR AUTO: NEGATIVE /HPF — SIGNIFICANT CHANGE UP
BASE EXCESS BLDV CALC-SCNC: -5.1 MMOL/L — LOW (ref -2–3)
BASE EXCESS BLDV CALC-SCNC: -6.6 MMOL/L — LOW (ref -2–3)
BASOPHILS # BLD AUTO: 0.2 K/UL — SIGNIFICANT CHANGE UP (ref 0–0.2)
BASOPHILS # BLD AUTO: 0.31 K/UL — HIGH (ref 0–0.2)
BASOPHILS NFR BLD AUTO: 1 % — SIGNIFICANT CHANGE UP (ref 0–2)
BASOPHILS NFR BLD AUTO: 1.7 % — SIGNIFICANT CHANGE UP (ref 0–2)
BILIRUB SERPL-MCNC: 3.3 MG/DL — HIGH (ref 0.2–1.2)
BILIRUB SERPL-MCNC: 3.5 MG/DL — HIGH (ref 0.2–1.2)
BILIRUB SERPL-MCNC: 4.8 MG/DL — HIGH (ref 0.2–1.2)
BILIRUB UR-MCNC: NEGATIVE — SIGNIFICANT CHANGE UP
BUN SERPL-MCNC: 27 MG/DL — HIGH (ref 7–23)
BUN SERPL-MCNC: 32 MG/DL — HIGH (ref 7–23)
BUN SERPL-MCNC: 37 MG/DL — HIGH (ref 7–23)
BURR CELLS BLD QL SMEAR: PRESENT — SIGNIFICANT CHANGE UP
CA-I SERPL-SCNC: 1.22 MMOL/L — SIGNIFICANT CHANGE UP (ref 1.15–1.33)
CA-I SERPL-SCNC: 1.25 MMOL/L — SIGNIFICANT CHANGE UP (ref 1.15–1.33)
CALCIUM SERPL-MCNC: 9 MG/DL — SIGNIFICANT CHANGE UP (ref 8.4–10.5)
CALCIUM SERPL-MCNC: 9.5 MG/DL — SIGNIFICANT CHANGE UP (ref 8.4–10.5)
CALCIUM SERPL-MCNC: 9.6 MG/DL — SIGNIFICANT CHANGE UP (ref 8.4–10.5)
CAST: 0 /LPF — SIGNIFICANT CHANGE UP (ref 0–4)
CHLORIDE BLDV-SCNC: 103 MMOL/L — SIGNIFICANT CHANGE UP (ref 96–108)
CHLORIDE BLDV-SCNC: 106 MMOL/L — SIGNIFICANT CHANGE UP (ref 96–108)
CHLORIDE SERPL-SCNC: 102 MMOL/L — SIGNIFICANT CHANGE UP (ref 96–108)
CHLORIDE SERPL-SCNC: 103 MMOL/L — SIGNIFICANT CHANGE UP (ref 96–108)
CHLORIDE SERPL-SCNC: 105 MMOL/L — SIGNIFICANT CHANGE UP (ref 96–108)
CK MB CFR SERPL CALC: 4.8 NG/ML — HIGH (ref 0–3.8)
CK MB CFR SERPL CALC: 4.9 NG/ML — HIGH (ref 0–3.8)
CK SERPL-CCNC: 63 U/L — SIGNIFICANT CHANGE UP (ref 25–170)
CO2 BLDV-SCNC: 22 MMOL/L — SIGNIFICANT CHANGE UP (ref 22–26)
CO2 BLDV-SCNC: 22 MMOL/L — SIGNIFICANT CHANGE UP (ref 22–26)
CO2 SERPL-SCNC: 14 MMOL/L — LOW (ref 22–31)
CO2 SERPL-SCNC: 17 MMOL/L — LOW (ref 22–31)
CO2 SERPL-SCNC: 19 MMOL/L — LOW (ref 22–31)
COLOR SPEC: YELLOW — SIGNIFICANT CHANGE UP
CREAT SERPL-MCNC: 6.1 MG/DL — HIGH (ref 0.5–1.3)
CREAT SERPL-MCNC: 6.4 MG/DL — HIGH (ref 0.5–1.3)
CREAT SERPL-MCNC: 6.7 MG/DL — HIGH (ref 0.5–1.3)
DACRYOCYTES BLD QL SMEAR: SLIGHT — SIGNIFICANT CHANGE UP
DIFF PNL FLD: ABNORMAL
EGFR: 6 ML/MIN/1.73M2 — LOW
EGFR: 7 ML/MIN/1.73M2 — LOW
EGFR: 7 ML/MIN/1.73M2 — LOW
ELLIPTOCYTES BLD QL SMEAR: SIGNIFICANT CHANGE UP
EOSINOPHIL # BLD AUTO: 0.31 K/UL — SIGNIFICANT CHANGE UP (ref 0–0.5)
EOSINOPHIL # BLD AUTO: 0.63 K/UL — HIGH (ref 0–0.5)
EOSINOPHIL NFR BLD AUTO: 1.5 % — SIGNIFICANT CHANGE UP (ref 0–6)
EOSINOPHIL NFR BLD AUTO: 3.5 % — SIGNIFICANT CHANGE UP (ref 0–6)
FLUAV AG NPH QL: SIGNIFICANT CHANGE UP
FLUBV AG NPH QL: SIGNIFICANT CHANGE UP
GAS PNL BLDA: SIGNIFICANT CHANGE UP
GAS PNL BLDV: 135 MMOL/L — LOW (ref 136–145)
GAS PNL BLDV: 136 MMOL/L — SIGNIFICANT CHANGE UP (ref 136–145)
GAS PNL BLDV: SIGNIFICANT CHANGE UP
GLUCOSE BLDV-MCNC: 113 MG/DL — HIGH (ref 70–99)
GLUCOSE BLDV-MCNC: 126 MG/DL — HIGH (ref 70–99)
GLUCOSE SERPL-MCNC: 105 MG/DL — HIGH (ref 70–99)
GLUCOSE SERPL-MCNC: 129 MG/DL — HIGH (ref 70–99)
GLUCOSE SERPL-MCNC: 132 MG/DL — HIGH (ref 70–99)
GLUCOSE UR QL: 250 MG/DL
HCO3 BLDV-SCNC: 21 MMOL/L — LOW (ref 22–29)
HCO3 BLDV-SCNC: 21 MMOL/L — LOW (ref 22–29)
HCT VFR BLD CALC: 39.8 % — SIGNIFICANT CHANGE UP (ref 34.5–45)
HCT VFR BLD CALC: 47.3 % — HIGH (ref 34.5–45)
HCT VFR BLDA CALC: 43 % — SIGNIFICANT CHANGE UP (ref 34.5–46.5)
HCT VFR BLDA CALC: 43 % — SIGNIFICANT CHANGE UP (ref 34.5–46.5)
HGB BLD CALC-MCNC: 14.2 G/DL — SIGNIFICANT CHANGE UP (ref 11.7–16.1)
HGB BLD CALC-MCNC: 14.3 G/DL — SIGNIFICANT CHANGE UP (ref 11.7–16.1)
HGB BLD-MCNC: 12.5 G/DL — SIGNIFICANT CHANGE UP (ref 11.5–15.5)
HGB BLD-MCNC: 14.3 G/DL — SIGNIFICANT CHANGE UP (ref 11.5–15.5)
IMM GRANULOCYTES NFR BLD AUTO: 1.8 % — HIGH (ref 0–0.9)
INR BLD: 1.13 RATIO — SIGNIFICANT CHANGE UP (ref 0.85–1.18)
INR BLD: 1.29 RATIO — HIGH (ref 0.85–1.18)
KETONES UR-MCNC: NEGATIVE MG/DL — SIGNIFICANT CHANGE UP
LACTATE BLDV-MCNC: 2.2 MMOL/L — HIGH (ref 0.5–2)
LACTATE BLDV-MCNC: 2.6 MMOL/L — HIGH (ref 0.5–2)
LACTATE SERPL-SCNC: 0.8 MMOL/L — SIGNIFICANT CHANGE UP (ref 0.5–2)
LEUKOCYTE ESTERASE UR-ACNC: NEGATIVE — SIGNIFICANT CHANGE UP
LYMPHOCYTES # BLD AUTO: 0.5 K/UL — LOW (ref 1–3.3)
LYMPHOCYTES # BLD AUTO: 1.57 K/UL — SIGNIFICANT CHANGE UP (ref 1–3.3)
LYMPHOCYTES # BLD AUTO: 2.4 % — LOW (ref 13–44)
LYMPHOCYTES # BLD AUTO: 8.7 % — LOW (ref 13–44)
MACROCYTES BLD QL: SLIGHT — SIGNIFICANT CHANGE UP
MAGNESIUM SERPL-MCNC: 2 MG/DL — SIGNIFICANT CHANGE UP (ref 1.6–2.6)
MAGNESIUM SERPL-MCNC: 2.2 MG/DL — SIGNIFICANT CHANGE UP (ref 1.6–2.6)
MANUAL SMEAR VERIFICATION: SIGNIFICANT CHANGE UP
MCHC RBC-ENTMCNC: 30.2 GM/DL — LOW (ref 32–36)
MCHC RBC-ENTMCNC: 30.3 PG — SIGNIFICANT CHANGE UP (ref 27–34)
MCHC RBC-ENTMCNC: 30.4 PG — SIGNIFICANT CHANGE UP (ref 27–34)
MCHC RBC-ENTMCNC: 31.4 GM/DL — LOW (ref 32–36)
MCV RBC AUTO: 100.2 FL — HIGH (ref 80–100)
MCV RBC AUTO: 96.8 FL — SIGNIFICANT CHANGE UP (ref 80–100)
MONOCYTES # BLD AUTO: 0.31 K/UL — SIGNIFICANT CHANGE UP (ref 0–0.9)
MONOCYTES # BLD AUTO: 0.61 K/UL — SIGNIFICANT CHANGE UP (ref 0–0.9)
MONOCYTES NFR BLD AUTO: 1.7 % — LOW (ref 2–14)
MONOCYTES NFR BLD AUTO: 2.9 % — SIGNIFICANT CHANGE UP (ref 2–14)
NEUTROPHILS # BLD AUTO: 15.27 K/UL — HIGH (ref 1.8–7.4)
NEUTROPHILS # BLD AUTO: 18.81 K/UL — HIGH (ref 1.8–7.4)
NEUTROPHILS NFR BLD AUTO: 81.8 % — HIGH (ref 43–77)
NEUTROPHILS NFR BLD AUTO: 90.4 % — HIGH (ref 43–77)
NEUTS BAND # BLD: 2.6 % — SIGNIFICANT CHANGE UP (ref 0–8)
NITRITE UR-MCNC: NEGATIVE — SIGNIFICANT CHANGE UP
NRBC # BLD: 0 /100 WBCS — SIGNIFICANT CHANGE UP (ref 0–0)
NT-PROBNP SERPL-SCNC: HIGH PG/ML (ref 0–300)
PCO2 BLDV: 43 MMHG — HIGH (ref 39–42)
PCO2 BLDV: 49 MMHG — HIGH (ref 39–42)
PH BLDV: 7.24 — LOW (ref 7.32–7.43)
PH BLDV: 7.3 — LOW (ref 7.32–7.43)
PH UR: 8 — SIGNIFICANT CHANGE UP (ref 5–8)
PHOSPHATE SERPL-MCNC: 5.4 MG/DL — HIGH (ref 2.5–4.5)
PLAT MORPH BLD: NORMAL — SIGNIFICANT CHANGE UP
PLATELET # BLD AUTO: 160 K/UL — SIGNIFICANT CHANGE UP (ref 150–400)
PLATELET # BLD AUTO: 183 K/UL — SIGNIFICANT CHANGE UP (ref 150–400)
PO2 BLDV: 21 MMHG — LOW (ref 25–45)
PO2 BLDV: 32 MMHG — SIGNIFICANT CHANGE UP (ref 25–45)
POIKILOCYTOSIS BLD QL AUTO: SIGNIFICANT CHANGE UP
POLYCHROMASIA BLD QL SMEAR: SLIGHT — SIGNIFICANT CHANGE UP
POTASSIUM BLDV-SCNC: 4.5 MMOL/L — SIGNIFICANT CHANGE UP (ref 3.5–5.1)
POTASSIUM BLDV-SCNC: 5 MMOL/L — SIGNIFICANT CHANGE UP (ref 3.5–5.1)
POTASSIUM SERPL-MCNC: 4.4 MMOL/L — SIGNIFICANT CHANGE UP (ref 3.5–5.3)
POTASSIUM SERPL-MCNC: 5 MMOL/L — SIGNIFICANT CHANGE UP (ref 3.5–5.3)
POTASSIUM SERPL-MCNC: 5.5 MMOL/L — HIGH (ref 3.5–5.3)
POTASSIUM SERPL-SCNC: 4.4 MMOL/L — SIGNIFICANT CHANGE UP (ref 3.5–5.3)
POTASSIUM SERPL-SCNC: 5 MMOL/L — SIGNIFICANT CHANGE UP (ref 3.5–5.3)
POTASSIUM SERPL-SCNC: 5.5 MMOL/L — HIGH (ref 3.5–5.3)
PROCALCITONIN SERPL-MCNC: 0.76 NG/ML — HIGH (ref 0.02–0.1)
PROT SERPL-MCNC: 7.3 G/DL — SIGNIFICANT CHANGE UP (ref 6–8.3)
PROT SERPL-MCNC: 8.4 G/DL — HIGH (ref 6–8.3)
PROT SERPL-MCNC: 8.5 G/DL — HIGH (ref 6–8.3)
PROT UR-MCNC: 100 MG/DL
PROTHROM AB SERPL-ACNC: 11.8 SEC — SIGNIFICANT CHANGE UP (ref 9.5–13)
PROTHROM AB SERPL-ACNC: 13.4 SEC — HIGH (ref 9.5–13)
RBC # BLD: 4.11 M/UL — SIGNIFICANT CHANGE UP (ref 3.8–5.2)
RBC # BLD: 4.72 M/UL — SIGNIFICANT CHANGE UP (ref 3.8–5.2)
RBC # FLD: 21.3 % — HIGH (ref 10.3–14.5)
RBC # FLD: 21.6 % — HIGH (ref 10.3–14.5)
RBC BLD AUTO: ABNORMAL
RBC CASTS # UR COMP ASSIST: 1 /HPF — SIGNIFICANT CHANGE UP (ref 0–4)
RSV RNA NPH QL NAA+NON-PROBE: SIGNIFICANT CHANGE UP
SAO2 % BLDV: 23 % — LOW (ref 67–88)
SAO2 % BLDV: 47.5 % — LOW (ref 67–88)
SARS-COV-2 RNA SPEC QL NAA+PROBE: SIGNIFICANT CHANGE UP
SODIUM SERPL-SCNC: 135 MMOL/L — SIGNIFICANT CHANGE UP (ref 135–145)
SODIUM SERPL-SCNC: 139 MMOL/L — SIGNIFICANT CHANGE UP (ref 135–145)
SODIUM SERPL-SCNC: 140 MMOL/L — SIGNIFICANT CHANGE UP (ref 135–145)
SP GR SPEC: 1.01 — SIGNIFICANT CHANGE UP (ref 1–1.03)
SQUAMOUS # UR AUTO: 2 /HPF — SIGNIFICANT CHANGE UP (ref 0–5)
TROPONIN T, HIGH SENSITIVITY RESULT: 104 NG/L — HIGH (ref 0–51)
TROPONIN T, HIGH SENSITIVITY RESULT: 26 NG/L — SIGNIFICANT CHANGE UP (ref 0–51)
TROPONIN T, HIGH SENSITIVITY RESULT: 35 NG/L — SIGNIFICANT CHANGE UP (ref 0–51)
UROBILINOGEN FLD QL: 0.2 MG/DL — SIGNIFICANT CHANGE UP (ref 0.2–1)
WBC # BLD: 18.09 K/UL — HIGH (ref 3.8–10.5)
WBC # BLD: 20.81 K/UL — HIGH (ref 3.8–10.5)
WBC # FLD AUTO: 18.09 K/UL — HIGH (ref 3.8–10.5)
WBC # FLD AUTO: 20.81 K/UL — HIGH (ref 3.8–10.5)
WBC UR QL: 4 /HPF — SIGNIFICANT CHANGE UP (ref 0–5)

## 2024-03-18 PROCEDURE — 99291 CRITICAL CARE FIRST HOUR: CPT | Mod: GC

## 2024-03-18 PROCEDURE — 71250 CT THORAX DX C-: CPT | Mod: 26,MC

## 2024-03-18 PROCEDURE — 99285 EMERGENCY DEPT VISIT HI MDM: CPT

## 2024-03-18 PROCEDURE — 71045 X-RAY EXAM CHEST 1 VIEW: CPT | Mod: 26

## 2024-03-18 PROCEDURE — 70450 CT HEAD/BRAIN W/O DYE: CPT | Mod: 26,MC

## 2024-03-18 PROCEDURE — 99223 1ST HOSP IP/OBS HIGH 75: CPT | Mod: GC

## 2024-03-18 RX ORDER — SODIUM NITROPRUSSIDE 50 MG/2ML
50 INJECTION INTRAVENOUS
Qty: 100 | Refills: 0 | Status: DISCONTINUED | OUTPATIENT
Start: 2024-03-18 | End: 2024-03-18

## 2024-03-18 RX ORDER — URSODIOL 250 MG/1
300 TABLET, FILM COATED ORAL
Refills: 0 | Status: DISCONTINUED | OUTPATIENT
Start: 2024-03-18 | End: 2024-03-27

## 2024-03-18 RX ORDER — AZITHROMYCIN 500 MG/1
TABLET, FILM COATED ORAL
Refills: 0 | Status: DISCONTINUED | OUTPATIENT
Start: 2024-03-19 | End: 2024-03-21

## 2024-03-18 RX ORDER — NITROGLYCERIN 6.5 MG
100 CAPSULE, EXTENDED RELEASE ORAL
Qty: 50 | Refills: 0 | Status: DISCONTINUED | OUTPATIENT
Start: 2024-03-18 | End: 2024-03-19

## 2024-03-18 RX ORDER — CEFTRIAXONE 500 MG/1
1000 INJECTION, POWDER, FOR SOLUTION INTRAMUSCULAR; INTRAVENOUS EVERY 24 HOURS
Refills: 0 | Status: DISCONTINUED | OUTPATIENT
Start: 2024-03-19 | End: 2024-03-18

## 2024-03-18 RX ORDER — PIPERACILLIN AND TAZOBACTAM 4; .5 G/20ML; G/20ML
3.38 INJECTION, POWDER, LYOPHILIZED, FOR SOLUTION INTRAVENOUS EVERY 12 HOURS
Refills: 0 | Status: DISCONTINUED | OUTPATIENT
Start: 2024-03-19 | End: 2024-03-19

## 2024-03-18 RX ORDER — AZITHROMYCIN 500 MG/1
500 TABLET, FILM COATED ORAL ONCE
Refills: 0 | Status: COMPLETED | OUTPATIENT
Start: 2024-03-18 | End: 2024-03-18

## 2024-03-18 RX ORDER — CEFTRIAXONE 500 MG/1
1000 INJECTION, POWDER, FOR SOLUTION INTRAMUSCULAR; INTRAVENOUS ONCE
Refills: 0 | Status: COMPLETED | OUTPATIENT
Start: 2024-03-18 | End: 2024-03-18

## 2024-03-18 RX ORDER — ACETAMINOPHEN 500 MG
1000 TABLET ORAL ONCE
Refills: 0 | Status: COMPLETED | OUTPATIENT
Start: 2024-03-18 | End: 2024-03-18

## 2024-03-18 RX ORDER — AZITHROMYCIN 500 MG/1
500 TABLET, FILM COATED ORAL EVERY 24 HOURS
Refills: 0 | Status: DISCONTINUED | OUTPATIENT
Start: 2024-03-20 | End: 2024-03-21

## 2024-03-18 RX ORDER — HEPARIN SODIUM 5000 [USP'U]/ML
5000 INJECTION INTRAVENOUS; SUBCUTANEOUS EVERY 8 HOURS
Refills: 0 | Status: DISCONTINUED | OUTPATIENT
Start: 2024-03-18 | End: 2024-03-27

## 2024-03-18 RX ORDER — CARVEDILOL PHOSPHATE 80 MG/1
3.12 CAPSULE, EXTENDED RELEASE ORAL EVERY 12 HOURS
Refills: 0 | Status: DISCONTINUED | OUTPATIENT
Start: 2024-03-18 | End: 2024-03-27

## 2024-03-18 RX ORDER — FUROSEMIDE 40 MG
80 TABLET ORAL ONCE
Refills: 0 | Status: COMPLETED | OUTPATIENT
Start: 2024-03-18 | End: 2024-03-18

## 2024-03-18 RX ORDER — HYDROMORPHONE HYDROCHLORIDE 2 MG/ML
0.2 INJECTION INTRAMUSCULAR; INTRAVENOUS; SUBCUTANEOUS ONCE
Refills: 0 | Status: DISCONTINUED | OUTPATIENT
Start: 2024-03-18 | End: 2024-03-18

## 2024-03-18 RX ORDER — FAMOTIDINE 10 MG/ML
20 INJECTION INTRAVENOUS DAILY
Refills: 0 | Status: DISCONTINUED | OUTPATIENT
Start: 2024-03-18 | End: 2024-03-18

## 2024-03-18 RX ORDER — PIPERACILLIN AND TAZOBACTAM 4; .5 G/20ML; G/20ML
3.38 INJECTION, POWDER, LYOPHILIZED, FOR SOLUTION INTRAVENOUS ONCE
Refills: 0 | Status: COMPLETED | OUTPATIENT
Start: 2024-03-18 | End: 2024-03-18

## 2024-03-18 RX ORDER — NITROGLYCERIN 6.5 MG
0.4 CAPSULE, EXTENDED RELEASE ORAL ONCE
Refills: 0 | Status: COMPLETED | OUTPATIENT
Start: 2024-03-18 | End: 2024-03-18

## 2024-03-18 RX ORDER — AZITHROMYCIN 500 MG/1
500 TABLET, FILM COATED ORAL ONCE
Refills: 0 | Status: COMPLETED | OUTPATIENT
Start: 2024-03-19 | End: 2024-03-19

## 2024-03-18 RX ORDER — PIPERACILLIN AND TAZOBACTAM 4; .5 G/20ML; G/20ML
3.38 INJECTION, POWDER, LYOPHILIZED, FOR SOLUTION INTRAVENOUS ONCE
Refills: 0 | Status: COMPLETED | OUTPATIENT
Start: 2024-03-19 | End: 2024-03-19

## 2024-03-18 RX ADMIN — HYDROMORPHONE HYDROCHLORIDE 0.2 MILLIGRAM(S): 2 INJECTION INTRAMUSCULAR; INTRAVENOUS; SUBCUTANEOUS at 17:30

## 2024-03-18 RX ADMIN — Medication 30 MICROGRAM(S)/MIN: at 21:04

## 2024-03-18 RX ADMIN — Medication 0.4 MILLIGRAM(S): at 08:08

## 2024-03-18 RX ADMIN — AZITHROMYCIN 255 MILLIGRAM(S): 500 TABLET, FILM COATED ORAL at 13:20

## 2024-03-18 RX ADMIN — Medication 400 MILLIGRAM(S): at 21:52

## 2024-03-18 RX ADMIN — Medication 80 MILLIGRAM(S): at 08:20

## 2024-03-18 RX ADMIN — Medication 30 MICROGRAM(S)/MIN: at 16:49

## 2024-03-18 RX ADMIN — Medication 1000 MILLIGRAM(S): at 22:34

## 2024-03-18 RX ADMIN — CEFTRIAXONE 100 MILLIGRAM(S): 500 INJECTION, POWDER, FOR SOLUTION INTRAMUSCULAR; INTRAVENOUS at 10:24

## 2024-03-18 NOTE — ED ADULT NURSE NOTE - OBJECTIVE STATEMENT
62 y/o F with PMHx of ESRD( dialysis on Monday, Wednesday, Fridays) , polycythemia/leukemia with c/o worsening SOB for past two weeks. Pt states this morning while voiding she had increased SOB. fistula noted to left arm thrill is palpable to site, last dialysis was past Friday. pt is  A&Ox4 able to follow all commands. Pulse, motor, sensation present and equal in all 4 extremities. Denies dizziness, blurry vision. Respirations spontaneous and unlabored on room air. Denies cough, CP, palpitations. EKG done. On CM, sinus tachy.  Denies abd pain, N/V/D/C. Abd soft NT/ND.  Denies urinary symptoms. Denies fever, chills. No sick contacts. Skin intact, warm, dry, normal for race.

## 2024-03-18 NOTE — H&P ADULT - ASSESSMENT
Acute Hypoxic repsiratory Failure       CT chest shows Fluid overload with small bilateral pleural effusions with or without   superimposed infectious/inflammatory process. Partially imaged   multiseptated cystic regions right lower lobe  Leukocytosis       From Fluid Over load /Pneumonia  in the setting of ESRD   Echo tele   Cards eval called     Pneumonia IV abx   Follow up cx     Polycythemia Continue with Hydroxyurea          Acute Hypoxic repsiratory Failure       CT chest shows Fluid overload with small bilateral pleural effusions with or without   superimposed infectious/inflammatory process. Partially imaged   multiseptated cystic regions right lower lobe  Leukocytosis       From Fluid Over load /Pneumonia  in the setting of ESRD   Echo tele   Cards eval called     Pneumonia IV abx   Follow up cx     Polycythemia Continue with Hydroxyurea       Addendum Patient noted to be in respiratory distress, placed on Biapa, Nitro drip IN THE ed, transferred level of care to MICU for emergent HD.

## 2024-03-18 NOTE — CONSULT NOTE ADULT - SUBJECTIVE AND OBJECTIVE BOX
24 @ 14:33    Patient is a 63y old  Female who presents with a chief complaint of     HPI:    63 year old female with hx of polycythemia, ESRD, on HD M/W/ presenting with shortness of breath. Patient states she has had progressively worsening shortness of breath over the last 2 weeks. Patient woke up this morning, urinated and subsequently had acute onset shortness of breath. Patient denies chest pain, vomiting, abdominal pain, fevers, coughing, sick contacts. Patient has associated mild nausea. Patient complains of headache. patient states her last dialysis session was on Friday.  she says  she is having chest tightness ofr last two week : yesterday when she went to bathroom she sweated a lot and she felt more sob  and hence came to hospital : she has no fever, cough  or phlegm   ?FOLLOWING PRESENT  [ x] Hx of PE/DVT, [ x] Hx COPD, [x ] Hx of Asthma, [y ] Hx of Hospitalization, [x ]  Hx of BiPAP/CPAP use, [ x] Hx of JOHN    Allergies    No Known Allergies    Intolerances        PAST MEDICAL & SURGICAL HISTORY:  ESRD on dialysis      Anemia secondary to renal failure          FAMILY HISTORY:      Social History: [ remote ] TOBACCO                  [ x ] ETOH                                 [ x ] IVDA/DRUGS    REVIEW OF SYSTEMS      General:x	    Skin/Breast:x  	  Ophthalmologic:x  	  ENMT:	x    Respiratory and Thorax: sweating,   increasing sob,  hypoxia   	  Cardiovascular:	x    Gastrointestinal:	x    Genitourinary:	x    Musculoskeletal:	x    Neurological:	x    Psychiatric:	x    Hematology/Lymphatics:	x    Endocrine:	x    Allergic/Immunologic:	x    MEDICATIONS  (STANDING):  azithromycin  IVPB      carvedilol 3.125 milliGRAM(s) Oral every 12 hours  cefTRIAXone   IVPB 1000 milliGRAM(s) IV Intermittent every 24 hours  famotidine    Tablet 20 milliGRAM(s) Oral daily  heparin   Injectable 5000 Unit(s) SubCutaneous every 8 hours  ursodiol Capsule 300 milliGRAM(s) Oral two times a day    MEDICATIONS  (PRN):       Vital Signs Last 24 Hrs  T(C): 36.4 (18 Mar 2024 08:18), Max: 36.4 (18 Mar 2024 08:18)  T(F): 97.6 (18 Mar 2024 08:18), Max: 97.6 (18 Mar 2024 08:18)  HR: 105 (18 Mar 2024 13:30) (102 - 120)  BP: 167/85 (18 Mar 2024 13:30) (167/85 - 208/125)  BP(mean): 109 (18 Mar 2024 13:30) (109 - 115)  RR: 26 (18 Mar 2024 13:30) (21 - 28)  SpO2: 99% (18 Mar 2024 13:30) (94% - 100%)    Parameters below as of 18 Mar 2024 13:30  Patient On (Oxygen Delivery Method): BiPAP/CPAP    Orthostatic VS          I&O's Summary      Physical Exam:   GENERAL: NAD, well-groomed, well-developed  HEENT: CASEY/   Atraumatic, Normocephalic  ENMT: No tonsillar erythema, exudates, or enlargement; Moist mucous membranes, Good dentition, No lesions  NECK: Supple, No JVD, Normal thyroid  CHEST/LUNG: bibasilar cracekls  CVS: Regular rate and rhythm; No murmurs, rubs, or gallops  GI: : Soft, Nontender, Nondistended; Bowel sounds present  NERVOUS SYSTEM:  Alert & Oriented X3  EXTREMITIES: *-edema  LYMPH: No lymphadenopathy noted  SKIN: No rashes or lesions  ENDOCRINOLOGY: No Thyromegaly  PSYCH: Appropriate    Labs:  Venous<43<4>>32<<7.305>>Venous<<3><<4><<5<<329>>                            14.3   18.09 )-----------( 183      ( 18 Mar 2024 08:54 )             47.3     03-18    140  |  102  |  27<H>  ----------------------------<  105<H>  4.4   |  19<L>  |  6.10<H>    Ca    9.6      18 Mar 2024 08:54  Mg     2.2     03-18    TPro  8.5<H>  /  Alb  4.6  /  TBili  4.8<H>  /  DBili  x   /  AST  20  /  ALT  15  /  AlkPhos  111  03-18    CAPILLARY BLOOD GLUCOSE        LIVER FUNCTIONS - ( 18 Mar 2024 08:54 )  Alb: 4.6 g/dL / Pro: 8.5 g/dL / ALK PHOS: 111 U/L / ALT: 15 U/L / AST: 20 U/L / GGT: x           PT/INR - ( 18 Mar 2024 08:54 )   PT: 11.8 sec;   INR: 1.13 ratio         PTT - ( 18 Mar 2024 08:54 )  PTT:39.9 sec  Urinalysis Basic - ( 18 Mar 2024 10:46 )    Color: Yellow / Appearance: Clear / S.008 / pH: x  Gluc: x / Ketone: Negative mg/dL  / Bili: Negative / Urobili: 0.2 mg/dL   Blood: x / Protein: 100 mg/dL / Nitrite: Negative   Leuk Esterase: Negative / RBC: 1 /HPF / WBC 4 /HPF   Sq Epi: x / Non Sq Epi: 2 /HPF / Bacteria: Negative /HPF      D DImerLactate, Blood: 0.8 mmol/L ( @ 12:39)        Studies  Chest X-RAY  CT SCAN Chest   CT Abdomen  Venous Dopplers: LE:   Others      < from: Xray Chest 1 View- PORTABLE-Urgent (Xray Chest 1 View- PORTABLE-Urgent .) (24 @ 09:45) >  FINDINGS:    Support devices: Left axillary and left subclavian/brachiocephalic   stents.  Right-sided chest wall catheter with tip terminating in the   region of the superior cavoatrial junction.    Cardiac/mediastinum/hilum: Heart size is poorly evaluated on AP   projection however the cardiac silhouette appears enlarged.    Lung parenchyma/Pleura: Diffuse increased interstitial markings and   peribronchial cuffing consistent with pulmonary edema. No visualized   pleural effusions or pneumothorax.    Skeleton/soft tissues: No acute pathology.      IMPRESSION:    There is increased interstitial markings and peribronchial cuffing   consistent with pulmonary edema.    --- End of Report ---           BROOK ALLEN MD; Resident Radiologist  This document has been electronically signed.   SHARMAINE LOPEZ DO; Attending Radiologist  This document has been electronically signed. Mar 18 2024 10:55AM    < end of copied text >      ct< from: CT Chest No Cont (24 @ 09:21) >  LUNGS, pleura, AND AIRWAYS: Patent central airways.  Bilateral   interlobular septal thickening and hazy opacities. Small bilateral   pleural effusions with adjacent dependent patchy opacity. Partially   imaged multiseptated cystic regions and right lower lobe.  MEDIASTINUM AND DAVION: Calcified mediastinal and right hilar lymph nodes.  VESSELS: Left subclavian graft. Right-sided central line with tip in the   SVC.  HEART: Heart is mildly enlarged No pericardial effusion.  CHEST WALL AND LOWER NECK: Within normal limits.  VISUALIZED UPPER ABDOMEN: Significantly enlarged spleen.  BONES: Degenerative changes.    IMPRESSION:  Fluid overload with small bilateral pleural effusions with or without   superimposed infectious/inflammatory process. Partially imaged   multiseptated cystic regions right lower lobe. Recommend short-term   follow-up CT .    --- End of Report ---    < end of copied text >      o

## 2024-03-18 NOTE — CONSULT NOTE ADULT - PROBLEM SELECTOR RECOMMENDATION 9
Pt was getting HD from [facility] and her last HD was on 3/15/24 and mentioned that she did not skip any medications/ follow ups. Pt follows Dr. De León. At the time of presentation, pt has high blood pressure in 200s/120s. She was placed on NTG gtt and BiPAP. CT chest showed fluid in the Rt > Lt along with ? infectious process. At the time of examination BP was better and she is still on BiPAP. Labs were reviewed. H/H stable. Pro BNP elevated. Lytes stable with HCO3 of 19. Venous pH of 7.30. Pt is in respiratory distress and is saturating fine with BiPAP. She mentioned that she still has residual urine output.  Consent was obtained and was placed in the charts.   PLAN:  Urgent HD. Orders placed and HD unit was informed. Plan to remove 2.5L of fluid ( as tolerated)  Strict I/O and daily wts.   Dose medications to ESRD dosing.  Rest of the management as per primary team.

## 2024-03-18 NOTE — CONSULT NOTE ADULT - ASSESSMENT
63 year old female with hx of polycythemia, ESRD, on HD M/W/F presenting with shortness of breath. Patient states she has had progressively worsening shortness of breath over the last 2 weeks. Patient woke up this morning, urinated and subsequently had acute onset shortness of breath. Patient denies chest pain, vomiting, abdominal pain, fevers, coughing, sick contacts. Patient has associated mild nausea. Patient complains of headache. patient states her last dialysis session was on Friday.  she says  she ias having chest tightness ofr last two week : yesterday when she went to bathroom she sweated a lot and she felt more sob  and hence came to hospital : she has no fever, cough  or phlegm:    SOB hypoxic resp failure  ESRD  Polycythemia    SOB hypoxic resp failure  -AVBG today showed mild combined resp and met acidosis  -sheis on 30% bipap:  sheis alert and awake and I think  we can change to nassal cannula  ESRD  Polycythemia   63 year old female with hx of polycythemia, ESRD, on HD M/W/F presenting with shortness of breath. Patient states she has had progressively worsening shortness of breath over the last 2 weeks. Patient woke up this morning, urinated and subsequently had acute onset shortness of breath. Patient denies chest pain, vomiting, abdominal pain, fevers, coughing, sick contacts. Patient has associated mild nausea. Patient complains of headache. patient states her last dialysis session was on Friday.  she says  she ias having chest tightness ofr last two week : yesterday when she went to bathroom she sweated a lot and she felt more sob  and hence came to hospital : she has no fever, cough  or phlegm:    SOB hypoxic resp failure  ESRD  Polycythemia    SOB hypoxic resp failure  -ABG today showed mild combined resp and met acidosis  -she is on 30% bipap:  she is alert and awake and I think  we can change to nassal cannula  -Keep o2 sao2 above 90% all the time:   -the ct scan chest is reviewed:  suggestive of chf:  -Her WBC c ount is high ; started on empiric antibiotics  follow all cultures:   -cont HD for fluid overload    ESRD  -on hd   Polycythemia  -she was evaluted by [facility] on last admission in december fo 2023 and at that time it was considered the the changes they saw on PBS showed transfused cells;    -the hb is high this time given she has esrd:   -? repeat [facility]monc consult:     sabina sanchez acp

## 2024-03-18 NOTE — CHART NOTE - NSCHARTNOTEFT_GEN_A_CORE
MICU ACCEPT NOTE    CHIEF COMPLAINT: Patient is a 63y old  Female who presents with a chief complaint of SOB x 2 weeks (18 Mar 2024 17:26)      HPI:  63 year old female with hx of polycythemia, ESRD, on HD M/W/F presenting with shortness of breath.   Patient states she has had progressively worsening shortness of breath over the last 2 weeks. Last HD Friday.   Patient woke up this morning, urinated and subsequently had acute onset shortness of breath.   Patient denies chest pain, vomiting, abdominal pain, fevers, coughing, sick contacts.    In thee d patient had CT chest shows Fluid overload with small bilateral pleural effusions with or without   superimposed infectious/inflammatory process. Partially imaged   multiseptated cystic regions right lower lobe (18 Mar 2024 16:43)      PAST MEDICAL & SURGICAL HISTORY:  ESRD on dialysis      Anemia secondary to renal failure          FAMILY HISTORY:      SOCIAL HISTORY:  Smoking:   Substance Use:   EtOH Use:   Advance Directives:     MEDICATIONS  (STANDING):  azithromycin  IVPB      carvedilol 3.125 milliGRAM(s) Oral every 12 hours  cefTRIAXone   IVPB 1000 milliGRAM(s) IV Intermittent every 24 hours  heparin   Injectable 5000 Unit(s) SubCutaneous every 8 hours  nitroglycerin  Infusion 100 MICROgram(s)/Min (30 mL/Hr) IV Continuous <Continuous>  ursodiol Capsule 300 milliGRAM(s) Oral two times a day    MEDICATIONS  (PRN):      Allergies    No Known Allergies    Intolerances        REVIEW OF SYSTEMS:  CONSTITUTIONAL: No weakness, fevers or chills  EYES/ENT: No visual changes;  No vertigo or throat pain   NECK: No pain or stiffness  RESPIRATORY: No cough, wheezing, hemoptysis; No shortness of breath  CARDIOVASCULAR: No chest pain or palpitations  GASTROINTESTINAL: No abdominal or epigastric pain. No nausea, vomiting, or hematemesis; No diarrhea or constipation. No melena or hematochezia.  GENITOURINARY: No dysuria, frequency or hematuria  NEUROLOGICAL: No numbness or weakness  SKIN: No itching, rashes  [ ] All other systems negative  [ ] Unable to assess ROS because ________    OBJECTIVE:  ICU Vital Signs Last 24 Hrs  T(C): 36.4 (18 Mar 2024 08:18), Max: 36.4 (18 Mar 2024 08:18)  T(F): 97.6 (18 Mar 2024 08:18), Max: 97.6 (18 Mar 2024 08:18)  HR: 124 (18 Mar 2024 18:03) (102 - 124)  BP: 157/98 (18 Mar 2024 18:03) (157/98 - 208/125)  BP(mean): 116 (18 Mar 2024 18:03) (109 - 116)  ABP: --  ABP(mean): --  RR: 29 (18 Mar 2024 18:03) (21 - 29)  SpO2: 100% (18 Mar 2024 18:03) (94% - 100%)    O2 Parameters below as of 18 Mar 2024 18:03  Patient On (Oxygen Delivery Method): BiPAP/CPAP              CAPILLARY BLOOD GLUCOSE          PHYSICAL EXAM:  GENERAL: NAD, lying in bed comfortably  HEAD:  Atraumatic, normocephalic  EYES: EOMI, PERRLA, conjunctiva and sclera clear  ENT: Moist mucous membranes  NECK: Supple, no JVD  HEART: S1, S2, regular rate and rhythm, no murmurs, rubs, or gallops  LUNGS: Unlabored respirations.  Clear to auscultation bilaterally, no crackles, wheezing, or rhonchi  ABDOMEN: Soft, nontender, nondistended, +BS  EXTREMITIES: 2+ peripheral pulses bilaterally. No clubbing, cyanosis, or edema  NERVOUS SYSTEM:  A&Ox3, no focal deficits   SKIN: No rashes or lesions  LINES:     LABS:                        14.3   18.09 )-----------( 183      ( 18 Mar 2024 08:54 )             47.3     Hgb Trend: 14.3<--  03-18    139  |  103  |  32<H>  ----------------------------<  129<H>  5.0   |  17<L>  |  6.40<H>    Ca    9.5      18 Mar 2024 17:20  Mg     2.2     03-18    TPro  8.4<H>  /  Alb  4.4  /  TBili  3.5<H>  /  DBili  x   /  AST  28  /  ALT  14  /  AlkPhos  112  03-18    Creatinine Trend: 6.40<--, 6.10<--  PT/INR - ( 18 Mar 2024 08:54 )   PT: 11.8 sec;   INR: 1.13 ratio         PTT - ( 18 Mar 2024 08:54 )  PTT:39.9 sec  Urinalysis Basic - ( 18 Mar 2024 17:20 )    Color: x / Appearance: x / SG: x / pH: x  Gluc: 129 mg/dL / Ketone: x  / Bili: x / Urobili: x   Blood: x / Protein: x / Nitrite: x   Leuk Esterase: x / RBC: x / WBC x   Sq Epi: x / Non Sq Epi: x / Bacteria: x        Venous Blood Gas:  03-18 @ 17:20  7.24/49/21/21/23.0  VBG Lactate: 2.6  Venous Blood Gas:  03-18 @ 08:23  7.30/43/32/21/47.5  VBG Lactate: 2.2      MICROBIOLOGY:     RADIOLOGY & ADDITIONAL TESTS:    ASSESSMENT  LETTY BECKMAN is a 63y female with PMH *** in the hospital for  transferred to the MICU for ***.    PLAN  =====Neurologic=====  Patient is AOx4    =====Pulmonary=====  Patient breathing comfortably on room air    =====Cardiovascular=====  Patient does not currently require vasopressors and is normotensive    =====GI=====  #GERD  - Protonix 40mg IV QD    #Diet  - Full diet    =====Renal/=====  #Electrolytes  - Maintain K>4, Phos>3, Mag>2, iCal>1    =====Endocrine=====  #DM2  - While NPO, FSBG and TIFF q6h    =====Infectious Disease=====  Patient is afebrile and is not currently being treated for any infection.    =====Heme/Onc=====  #DVT Ppx  - Lovenox 40mg SQ qd    =====Ethics=====  FULL CODE MICU ACCEPT NOTE    CHIEF COMPLAINT: Patient is a 63y old  Female who presents with a chief complaint of SOB x 2 weeks (18 Mar 2024 17:26)      HPI:  Mihyeon Hu is a 63 y.o. 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 SOB. Patient endorses progressively worsening SOB over the last 2 weeks. Per pt, after urinating this AM she subsequently developed acute onset of SOB. She does also c.o. a headache, but denies F/C, coughing, vomiting however, endorsing nausea, otherwise, denies CP, abd pain, sick contacts. Of note, last HD Friday 03/15/24.     In the ED, pt negative for Covid, flu, RSV. Labs significant for WBC: 18.09, initial bicarb of 19, redraw of 17, initial trop of 26, repeat of 35, anion gap of 17, procal of 0.76, [place] 198[address]. CT Head negative for acute infarction or hemorrhage, CT chest notable for fluid overload status with small b/l pleural effusions. CXR demonstrates increased interstitial markings and peribronchial cuffing consistent with pulmonary edema. Pt admitted to MICU for acute hypoxemic respiratory failure in the         PAST MEDICAL & SURGICAL HISTORY:  ESRD on dialysis      Anemia secondary to renal failure          FAMILY HISTORY:      SOCIAL HISTORY:  Smoking:   Substance Use:   EtOH Use:   Advance Directives:     MEDICATIONS  (STANDING):  azithromycin  IVPB      carvedilol 3.125 milliGRAM(s) Oral every 12 hours  cefTRIAXone   IVPB 1000 milliGRAM(s) IV Intermittent every 24 hours  heparin   Injectable 5000 Unit(s) SubCutaneous every 8 hours  nitroglycerin  Infusion 100 MICROgram(s)/Min (30 mL/Hr) IV Continuous <Continuous>  ursodiol Capsule 300 milliGRAM(s) Oral two times a day    MEDICATIONS  (PRN):      Allergies    No Known Allergies    Intolerances        REVIEW OF SYSTEMS:  CONSTITUTIONAL: No weakness, fevers or chills  EYES/ENT: No visual changes;  No vertigo or throat pain   NECK: No pain or stiffness  RESPIRATORY: No cough, wheezing, hemoptysis; No shortness of breath  CARDIOVASCULAR: No chest pain or palpitations  GASTROINTESTINAL: No abdominal or epigastric pain. No nausea, vomiting, or hematemesis; No diarrhea or constipation. No melena or hematochezia.  GENITOURINARY: No dysuria, frequency or hematuria  NEUROLOGICAL: No numbness or weakness  SKIN: No itching, rashes  [ ] All other systems negative  [ ] Unable to assess ROS because ________    OBJECTIVE:  ICU Vital Signs Last 24 Hrs  T(C): 36.4 (18 Mar 2024 08:18), Max: 36.4 (18 Mar 2024 08:18)  T(F): 97.6 (18 Mar 2024 08:18), Max: 97.6 (18 Mar 2024 08:18)  HR: 124 (18 Mar 2024 18:03) (102 - 124)  BP: 157/98 (18 Mar 2024 18:03) (157/98 - 208/125)  BP(mean): 116 (18 Mar 2024 18:03) (109 - 116)  ABP: --  ABP(mean): --  RR: 29 (18 Mar 2024 18:03) (21 - 29)  SpO2: 100% (18 Mar 2024 18:03) (94% - 100%)    O2 Parameters below as of 18 Mar 2024 18:03  Patient On (Oxygen Delivery Method): BiPAP/CPAP              CAPILLARY BLOOD GLUCOSE          PHYSICAL EXAM:  GENERAL: NAD, lying in bed comfortably  HEAD:  Atraumatic, normocephalic  EYES: EOMI, PERRLA, conjunctiva and sclera clear  ENT: Moist mucous membranes  NECK: Supple, no JVD  HEART: S1, S2, regular rate and rhythm, no murmurs, rubs, or gallops  LUNGS: Unlabored respirations.  Clear to auscultation bilaterally, no crackles, wheezing, or rhonchi  ABDOMEN: Soft, nontender, nondistended, +BS  EXTREMITIES: 2+ peripheral pulses bilaterally. No clubbing, cyanosis, or edema  NERVOUS SYSTEM:  A&Ox3, no focal deficits   SKIN: No rashes or lesions  LINES:     LABS:                        14.3   18.09 )-----------( 183      ( 18 Mar 2024 08:54 )             47.3     Hgb Trend: 14.3<--  03-18    139  |  103  |  32<H>  ----------------------------<  129<H>  5.0   |  17<L>  |  6.40<H>    Ca    9.5      18 Mar 2024 17:20  Mg     2.2     03-18    TPro  8.4<H>  /  Alb  4.4  /  TBili  3.5<H>  /  DBili  x   /  AST  28  /  ALT  14  /  AlkPhos  112  03-18    Creatinine Trend: 6.40<--, 6.10<--  PT/INR - ( 18 Mar 2024 08:54 )   PT: 11.8 sec;   INR: 1.13 ratio         PTT - ( 18 Mar 2024 08:54 )  PTT:39.9 sec  Urinalysis Basic - ( 18 Mar 2024 17:20 )    Color: x / Appearance: x / SG: x / pH: x  Gluc: 129 mg/dL / Ketone: x  / Bili: x / Urobili: x   Blood: x / Protein: x / Nitrite: x   Leuk Esterase: x / RBC: x / WBC x   Sq Epi: x / Non Sq Epi: x / Bacteria: x        Venous Blood Gas:  03-18 @ 17:20  7.24/49/21/21/23.0  VBG Lactate: 2.6  Venous Blood Gas:  03-18 @ 08:23  7.30/43/32/21/47.5  VBG Lactate: 2.2      MICROBIOLOGY:     RADIOLOGY & ADDITIONAL TESTS:    ASSESSMENT  LETTY LANDIN is a 63y female with PMH *** in the hospital for  transferred to the MICU for ***.    PLAN  =====Neurologic=====  Patient is AOx4    =====Pulmonary=====  Patient breathing comfortably on room air    =====Cardiovascular=====  Patient does not currently require vasopressors and is normotensive    =====GI=====  #GERD  - Protonix 40mg IV QD    #Diet  - Full diet    =====Renal/=====  #Electrolytes  - Maintain K>4, Phos>3, Mag>2, iCal>1    =====Endocrine=====  #DM2  - While NPO, FSBG and TIFF q6h    =====Infectious Disease=====  Patient is afebrile and is not currently being treated for any infection.    =====Heme/Onc=====  #DVT Ppx  - Lovenox 40mg SQ qd    =====Ethics=====  FULL CODE MICU ACCEPT NOTE    CHIEF COMPLAINT: Patient is a 63y old  Female who presents with a chief complaint of SOB x 2 weeks (18 Mar 2024 17:26)      HPI:  Mihyeon Hu is a 63 y.o. 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 SOB. Patient endorses progressively worsening SOB over the last 2 weeks. Per pt, after urinating this AM she subsequently developed acute onset of SOB. She does also c.o. a headache, but denies F/C, coughing, vomiting however, endorsing nausea, otherwise, denies CP, abd pain, sick contacts. Of note, last HD Friday 03/15/24. Pt denies changes in diet or increased intake of salty foods.     In the ED, pt negative for Covid, flu, RSV. Labs significant for WBC: 18.09, initial bicarb of 19, redraw of 17, initial trop of 26, repeat of 35, anion gap of 17, procal of 0.76, [place] [address]. CT Head negative for acute infarction or hemorrhage, CT chest notable for fluid overload status with small b/l pleural effusions. CXR demonstrates increased interstitial markings and peribronchial cuffing consistent with pulmonary edema. Started on Ceftriaxone and azithromycin for PNA. Pt admitted to MICU for acute hypoxemic respiratory failure likely 2/2 to fluid overload status, possibly PNA.       PAST MEDICAL & SURGICAL HISTORY:  ESRD on dialysis    Anemia secondary to renal failure      FAMILY HISTORY:      SOCIAL HISTORY:  Smoking:   Substance Use:   EtOH Use:   Advance Directives:     MEDICATIONS  (STANDING):  azithromycin  IVPB      carvedilol 3.125 milliGRAM(s) Oral every 12 hours  cefTRIAXone   IVPB 1000 milliGRAM(s) IV Intermittent every 24 hours  heparin   Injectable 5000 Unit(s) SubCutaneous every 8 hours  nitroglycerin  Infusion 100 MICROgram(s)/Min (30 mL/Hr) IV Continuous <Continuous>  ursodiol Capsule 300 milliGRAM(s) Oral two times a day    MEDICATIONS  (PRN):      Allergies    No Known Allergies    Intolerances        REVIEW OF SYSTEMS:  CONSTITUTIONAL: No weakness, fevers or chills  EYES/ENT: No visual changes;  No vertigo or throat pain   NECK: No pain or stiffness  RESPIRATORY: Shortness of breath  CARDIOVASCULAR: chest tightness/pain  GASTROINTESTINAL: No abdominal or epigastric pain. Nausea without vomiting, or hematemesis; No diarrhea or constipation. No melena or hematochezia.  GENITOURINARY: No dysuria, frequency or hematuria  NEUROLOGICAL: No numbness or weakness. Headache  SKIN: No itching, rashes      OBJECTIVE:  ICU Vital Signs Last 24 Hrs  T(C): 36.4 (18 Mar 2024 08:18), Max: 36.4 (18 Mar 2024 08:18)  T(F): 97.6 (18 Mar 2024 08:18), Max: 97.6 (18 Mar 2024 08:18)  HR: 124 (18 Mar 2024 18:03) (102 - 124)  BP: 157/98 (18 Mar 2024 18:03) (157/98 - 208/125)  BP(mean): 116 (18 Mar 2024 18:03) (109 - 116)  ABP: --  ABP(mean): --  RR: 29 (18 Mar 2024 18:03) (21 - 29)  SpO2: 100% (18 Mar 2024 18:03) (94% - 100%)    O2 Parameters below as of 18 Mar 2024 18:03  Patient On (Oxygen Delivery Method): BiPAP/CPAP              CAPILLARY BLOOD GLUCOSE          PHYSICAL EXAM:  GENERAL: NAD, laying in bed on BiPAP  NECK: Supple, no JVD  HEART: RRR, no murmurs  LUNGS: tachypneic on BiPAP. CTA bilaterally, no crackles, wheezing, or rhonchi  ABDOMEN: Soft, nontender, nondistended  EXTREMITIES: 1+ peripheral edema bilaterally.   NERVOUS SYSTEM:  A&Ox3  SKIN: No rashes or lesions    LINES:     LABS:                        14.3   18.09 )-----------( 183      ( 18 Mar 2024 08:54 )             47.3     Hgb Trend: 14.3<--  03-18    139  |  103  |  32<H>  ----------------------------<  129<H>  5.0   |  17<L>  |  6.40<H>    Ca    9.5      18 Mar 2024 17:20  Mg     2.2     03-18    TPro  8.4<H>  /  Alb  4.4  /  TBili  3.5<H>  /  DBili  x   /  AST  28  /  ALT  14  /  AlkPhos  112  03-18    Creatinine Trend: 6.40<--, 6.10<--  PT/INR - ( 18 Mar 2024 08:54 )   PT: 11.8 sec;   INR: 1.13 ratio         PTT - ( 18 Mar 2024 08:54 )  PTT:39.9 sec  Urinalysis Basic - ( 18 Mar 2024 17:20 )    Color: x / Appearance: x / SG: x / pH: x  Gluc: 129 mg/dL / Ketone: x  / Bili: x / Urobili: x   Blood: x / Protein: x / Nitrite: x   Leuk Esterase: x / RBC: x / WBC x   Sq Epi: x / Non Sq Epi: x / Bacteria: x        Venous Blood Gas:  03-18 @ 17:20  7.24/49/21/21/23.0  VBG Lactate: 2.6  Venous Blood Gas:  03-18 @ 08:23  7.30/43/32/21/47.5  VBG Lactate: 2.2      MICROBIOLOGY:     RADIOLOGY & ADDITIONAL TESTS:    ASSESSMENT  Mihyeon Hu is a 63 y.o. 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 SOB, progressively worsening SOB over the last 2 weeks. In the ED, found to be fluid overloaded on CT chest, with small b/l plural effusions, WBC: 18.09, [place] of [address]. Admitted to MICU for acute hypoxemic respiratory failure likely 2/2 to fluid overload status, possibly PNA.        PLAN  =====Neurologic=====  Patient is AOx4  CHERRY    =====Pulmonary=====  #Acute hypoxemic respiratory failure  CT chest: fluid overload status with small b/l pleural effusions  CXR: increased interstitial markings and peribronchial cuffing consistent with pulmonary edema  -on BiPAP 15/8 at 50% FiO2  -hemodialysis   - infectious work up as stated below     =====Cardiovascular=====  #Chest pain  #Hypertensive emergency   on Nitro drip currently   - titrate Nitro drip to   - r/o acute cardiac pathology  - repeat EKG, trend trop, ECHO    =====GI=====  #Nausea   -antiemetic PRN       #Diet  - DASH diet    =====Renal/=====  #ESRD on HD M/W/F  - hemodialysis       =====Endocrine=====  CHERRY  -check A1C in AM    =====Infectious Disease=====  #PNA   - c/w Azithromycin (3/18 -   - Zosyn (3/18 -  - Urine legionella, urine strep pneumo, RVP, MRSA PCR    - f/u BCx, UCx, Sputum Cx    =====Heme/Onc=====  #Polycythemia vera  w/ secondary myelofibrosis  -c/w at home Hydroxyurea 500mg TID  -c/w at home Ojjaara 150mg qD    #DVT Ppx  - Lovenox 40mg daily     =====Ethics=====  FULL CODE  Surrogate decision maker: Daughter MICU ACCEPT NOTE    CHIEF COMPLAINT: Patient is a 63y old  Female who presents with a chief complaint of SOB x 2 weeks (18 Mar 2024 17:26)      HPI:  Mihyeon Hu is a 63 y.o. 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 SOB. Patient endorses progressively worsening SOB over the last 2 weeks. Per pt, after urinating this AM she subsequently developed acute onset of SOB. She does also c.o. a headache, but denies F/C, coughing, vomiting however, endorsing nausea, otherwise, denies CP, abd pain, sick contacts. Of note, last HD Friday 03/15/24. Pt denies changes in diet or increased intake of salty foods.     In the ED, pt negative for Covid, flu, RSV. Labs significant for WBC: 18.09, initial bicarb of 19, redraw of 17, initial trop of 26, repeat of 35, anion gap of 17, procal of 0.76, [place]NP of 19417. CT Head negative for acute infarction or hemorrhage, CT chest notable for fluid overload status with small b/l pleural effusions. CXR demonstrates increased interstitial markings and peribronchial cuffing consistent with pulmonary edema. Started on Ceftriaxone and azithromycin for PNA. Pt admitted to MICU for acute hypoxemic respiratory failure likely 2/2 to fluid overload status, possibly PNA.       PAST MEDICAL & SURGICAL HISTORY:  ESRD on dialysis    Anemia secondary to renal failure      FAMILY HISTORY:      SOCIAL HISTORY:  Smoking:   Substance Use:   EtOH Use:   Advance Directives:     MEDICATIONS  (STANDING):  azithromycin  IVPB      carvedilol 3.125 milliGRAM(s) Oral every 12 hours  cefTRIAXone   IVPB 1000 milliGRAM(s) IV Intermittent every 24 hours  heparin   Injectable 5000 Unit(s) SubCutaneous every 8 hours  nitroglycerin  Infusion 100 MICROgram(s)/Min (30 mL/Hr) IV Continuous <Continuous>  ursodiol Capsule 300 milliGRAM(s) Oral two times a day    MEDICATIONS  (PRN):      Allergies    No Known Allergies    Intolerances        REVIEW OF SYSTEMS:  CONSTITUTIONAL: No weakness, fevers or chills  EYES/ENT: No visual changes;  No vertigo or throat pain   NECK: No pain or stiffness  RESPIRATORY: Shortness of breath  CARDIOVASCULAR: chest tightness/pain  GASTROINTESTINAL: No abdominal or epigastric pain. Nausea without vomiting, or hematemesis; No diarrhea or constipation. No melena or hematochezia.  GENITOURINARY: No dysuria, frequency or hematuria  NEUROLOGICAL: No numbness or weakness. Headache  SKIN: No itching, rashes      OBJECTIVE:  ICU Vital Signs Last 24 Hrs  T(C): 36.4 (18 Mar 2024 08:18), Max: 36.4 (18 Mar 2024 08:18)  T(F): 97.6 (18 Mar 2024 08:18), Max: 97.6 (18 Mar 2024 08:18)  HR: 124 (18 Mar 2024 18:03) (102 - 124)  BP: 157/98 (18 Mar 2024 18:03) (157/98 - 208/125)  BP(mean): 116 (18 Mar 2024 18:03) (109 - 116)  RR: 29 (18 Mar 2024 18:03) (21 - 29)  SpO2: 100% (18 Mar 2024 18:03) (94% - 100%)    O2 Parameters below as of 18 Mar 2024 18:03  Patient On (Oxygen Delivery Method): BiPAP/CPAP      PHYSICAL EXAM:  GENERAL: NAD, laying in bed on BiPAP  NECK: Supple, no JVD  HEART: RRR, no murmurs  LUNGS: tachypneic on BiPAP. CTA bilaterally, no crackles, wheezing, or rhonchi  ABDOMEN: Soft, nontender, nondistended  EXTREMITIES: 1+ peripheral edema bilaterally.   NERVOUS SYSTEM:  A&Ox3  SKIN: No rashes or lesions    LINES:     LABS:                        14.3   18.09 )-----------( 183      ( 18 Mar 2024 08:54 )             47.3     Hgb Trend: 14.3<--  03-18    139  |  103  |  32<H>  ----------------------------<  129<H>  5.0   |  17<L>  |  6.40<H>    Ca    9.5      18 Mar 2024 17:20  Mg     2.2     03-18    TPro  8.4<H>  /  Alb  4.4  /  TBili  3.5<H>  /  DBili  x   /  AST  28  /  ALT  14  /  AlkPhos  112  03-18    Creatinine Trend: 6.40<--, 6.10<--  PT/INR - ( 18 Mar 2024 08:54 )   PT: 11.8 sec;   INR: 1.13 ratio         PTT - ( 18 Mar 2024 08:54 )  PTT:39.9 sec  Urinalysis Basic - ( 18 Mar 2024 17:20 )    Color: x / Appearance: x / SG: x / pH: x  Gluc: 129 mg/dL / Ketone: x  / Bili: x / Urobili: x   Blood: x / Protein: x / Nitrite: x   Leuk Esterase: x / RBC: x / WBC x   Sq Epi: x / Non Sq Epi: x / Bacteria: x        Venous Blood Gas:  03-18 @ 17:20  7.24/49/21/21/23.0  VBG Lactate: 2.6  Venous Blood Gas:  03-18 @ 08:23  7.30/43/32/21/47.5  VBG Lactate: 2.2      MICROBIOLOGY:     RADIOLOGY & ADDITIONAL TESTS:  Personally reviewed.    < from: CT Chest No Cont (03.18.24 @ 09:21) >      IMPRESSION:  Fluid overload with small bilateral pleural effusions with or without   superimposed infectious/inflammatory process. Partially imaged   multiseptated cystic regions right lower lobe. Recommend short-term   follow-up CT .    < end of copied text >    < from: CT Head No Cont (03.18.24 @ 09:16) >      IMPRESSION:  Mild chronic microvascular changes without evidence of an   acute transcortical infarction or hemorrhage.    < end of copied text >        ASSESSMENT  Mihyeon Hu is a 63 y.o. 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 SOB, progressively worsening SOB over the last 2 weeks. In the ED, found to be fluid overloaded on CT chest, with small b/l plural effusions, WBC: 18.09, [place] 118[address]. Admitted to MICU for acute hypoxemic respiratory failure likely 2/2 to fluid overload status, possibly PNA.        PLAN  =====Neurologic=====  Patient is AOx4  CHERRY    =====Pulmonary=====  #Acute hypoxemic respiratory failure  CT chest: fluid overload status with small b/l pleural effusions  CXR: increased interstitial markings and peribronchial cuffing consistent with pulmonary edema  -on BiPAP 15/8 at 50% FiO2, wean as tolerated s/p HD  -hemodialysis   - infectious work up and abx as stated below     =====Cardiovascular=====  #Chest pain  #Hypertensive emergency   on Nitro drip currently   - titrate Nitro drip to   - r/o acute cardiac pathology  - repeat EKG, trend trop, ECHO    =====GI=====  #Nausea and diarrhea due to myelofibrosis meds, acute on chronic  #Nodular regenerative hyperplasia w/ gastric varices    -antiemetic PRN   -continue PPI      #Diet  - DASH diet once off BiPAP, as she tolerates    =====Renal/=====  #ESRD on HD M/W/F  - hemodialysis for volume removal    =====Endocrine=====  CHERRY  -check A1C in AM    =====Infectious Disease=====  #PNA   - c/w Azithromycin (3/18 -   - Zosyn (3/18 -  - Urine legionella, urine strep pneumo, RVP, MRSA PCR    - f/u BCx, UCx, Sputum Cx    =====Heme/Onc=====  #Polycythemia vera  w/ secondary myelofibrosis  -c/w at home Hydroxyurea 500mg TID  -c/w at home Ojjaara 150mg qD    #DVT Ppx  - Lovenox 40mg daily     =====Ethics=====  FULL CODE  Surrogate decision maker: Daughter

## 2024-03-18 NOTE — CONSULT NOTE ADULT - SUBJECTIVE AND OBJECTIVE BOX
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 She has some chest tightness and headache. Denies chest pain/ vomiting/ abdominal pain/ fever/ cough with expectoration / Sick contacts.   Nephrology was consulted for management of ESRD. Last dialysis was on Friday 3/15/24. She gets HD at [facility] dialysis [place]. Has been on HD  for ~6 years, primary nephrologist is Dr. De León, was previously on PD.         PAST HISTORY  --------------------------------------------------------------------------------  PAST MEDICAL & SURGICAL HISTORY:  ESRD on dialysis      Anemia secondary to renal failure        FAMILY HISTORY:    PAST SOCIAL HISTORY:    ALLERGIES & MEDICATIONS  --------------------------------------------------------------------------------  Allergies    No Known Allergies    Intolerances      Standing Inpatient Medications  azithromycin  IVPB      carvedilol 3.125 milliGRAM(s) Oral every 12 hours  cefTRIAXone   IVPB 1000 milliGRAM(s) IV Intermittent every 24 hours  heparin   Injectable 5000 Unit(s) SubCutaneous every 8 hours  HYDROmorphone  Injectable 0.2 milliGRAM(s) IV Push once  nitroprusside Infusion 50 MICROgram(s)/kG/Min IV Continuous <Continuous>  ursodiol Capsule 300 milliGRAM(s) Oral two times a day    PRN Inpatient Medications      REVIEW OF SYSTEMS  --------------------------------------------------------------------------------  Gen: No fevers/chills  Skin: No rashes  Head/Eyes/Ears: Normal hearing,   Respiratory: mild cough but no phlegm.  CV: No chest pain. But has chest tightness.   GI: No abdominal pain, diarrhea  : No dysuria, hematuria  MSK: No  edema  Heme: No easy bruising or bleeding  Psych: No significant depression    All other systems were reviewed and are negative, except as noted.    VITALS/PHYSICAL EXAM  --------------------------------------------------------------------------------  T(C): 36.4 (03-18-24 @ 08:18), Max: 36.4 (03-18-24 @ 08:18)  HR: 115 (03-18-24 @ 14:36) (102 - 120)  BP: 167/85 (03-18-24 @ 13:30) (167/85 - 208/125)  RR: 26 (03-18-24 @ 13:30) (21 - 28)  SpO2: 99% (03-18-24 @ 14:36) (94% - 100%)  Wt(kg): --  Height (cm): 157.5 (03-18-24 @ 16:43)  Weight (kg): 54 (03-18-24 @ 16:43)  BMI (kg/m2): 21.8 (03-18-24 @ 16:43)  BSA (m2): 1.53 (03-18-24 @ 16:43)      Physical Exam:  Gen: Pt is on BiPAP.   HEENT: MMM  Pulm: Lower zone decreased breath sounds in the posterior lung fields.   CV: S1S2, mild tachy.   Abd: Soft, +BS   Ext: No LE edema B/L  Neuro: Awake  Skin: Warm and dry  Vascular access: Lt UE AVF     LABS/STUDIES  --------------------------------------------------------------------------------              14.3   18.09 >-----------<  183      [03-18-24 @ 08:54]              47.3     140  |  102  |  27  ----------------------------<  105      [03-18-24 @ 08:54]  4.4   |  19  |  6.10        Ca     9.6     [03-18-24 @ 08:54]      Mg     2.2     [03-18-24 @ 08:54]    TPro  8.5  /  Alb  4.6  /  TBili  4.8  /  DBili  x   /  AST  20  /  ALT  15  /  AlkPhos  111  [03-18-24 @ 08:54]    PT/INR: PT 11.8 , INR 1.13       [03-18-24 @ 08:54]  PTT: 39.9       [03-18-24 @ 08:54]      Creatinine Trend:  SCr 6.10 [03-18 @ 08:54]    Urinalysis - [03-18-24 @ 10:46]      Color Yellow / Appearance Clear / SG 1.008 / pH 8.0      Gluc 250 / Ketone Negative  / Bili Negative / Urobili 0.2       Blood Trace / Protein 100 / Leuk Est Negative / Nitrite Negative      RBC 1 / WBC 4 / Hyaline  / Gran  / Sq Epi  / Non Sq Epi 2 / Bacteria Negative      Iron 86, TIBC 143, %sat 61      [11-27-23 @ 13:56]  Ferritin 1726      [11-27-23 @ 13:57]  PTH -- (Ca 8.9)      [11-28-23 @ 06:56]   89    HBsAb 7.0      [11-27-23 @ 13:57]  HBsAg Nonreact      [11-27-23 @ 13:56]  HBcAb Nonreact      [11-27-23 @ 13:57]  HCV 0.13, Nonreact      [11-27-23 @ 07:22]

## 2024-03-18 NOTE — ED PROVIDER NOTE - OBJECTIVE STATEMENT
63 year old female with hx of polycythemia, ESRD, on HD M/W/F presenting with shortness of breath. Patient states she has had progressively worsening shortness of breath over the last 2 weeks. Patient woke up this morning, urinated and subsequently had acute onset shortness of breath. Patient denies chest pain, vomiting, abdominal pain, fevers, coughing, sick contacts. Patient has associated mild nausea. Patient complains of headache. patient states her last dialysis session was on Friday.

## 2024-03-18 NOTE — ED PROVIDER NOTE - CLINICAL SUMMARY MEDICAL DECISION MAKING FREE TEXT BOX
63-year-old female with a past medical history of polycythemia, ESRD on dialysis M/W/F presenting with acute worsening of shortness of breath after experiencing progressively worsening shortness of breath over the last 2 weeks. Patient last received dialysis on Friday. Patient appears in respiratory distress. Physical exam reveals crackles in the lower lung fields. Point-of-care ultrasound showing B-lines in the lungs, plethoric IVC. Labs, troponin, BNP, BiPAP for respiratory distress and increased work of breathing, sublingual nitroglycerin, IV Lasix and metolazone.

## 2024-03-18 NOTE — RAPID RESPONSE TEAM SUMMARY - NSADDTLFINDINGSRRT_GEN_ALL_CORE
Vitals on arrival, temp 97.3. Documented  and /104, however on arrival BP was 200/>100 and HR was in the 140s. Patient tachypneic to 40s and satting 95% on bipap. Patient with increased WOB, tripoding with accessory mm use. RT holding BiPAP mask to patient's mouth (just had episode of emesis) while RN giving zofran. Patient was started on a nitro gtt per primary team around 40 min ago. Per ED attending, POCUS notable for b-lines, b/l pleff and pericardial effusion. Patient reporting chest tightness and tachypneic, gave dilaudid for pain and anxiety. Patient reported interval improvement and appears more comfortable, speaking in complete sentences, at conclusion of RRT /108, , satting 100% on bipap FiO2 50%. Primary team to follow up on cardiac labs and infectious eval, along with MICU/CCU and nephro recs.

## 2024-03-18 NOTE — ED ADULT NURSE REASSESSMENT NOTE - NS ED NURSE REASSESS COMMENT FT1
@17:17 RT was activated for pt due to decompensation, hypertension, tachycardia and increase work of breathing despite BIPAP. admitting provider already at bedside when rapid called.  RAPID team at bedside by 17:19 and pt  complained of some back pain and mild chest discomfort. 0.25 of Dilaudid was administered. pt had already been placed on nitro drip prior to rapid called due to BP, dose was increased from 50 microgram to 100. rapid ended at 17:45. admitting provider awaiting consult with MICU pt will have dialysis upstairs at bedside.
Admitting RONEL Faith at bedside, Per RONEL Wright pt to be weined off BiPAP and started on 6L NC. After trial, pt not tolerating 6LNC and has increased work of breathing causing pt to be placed back on BiPAP. Pt states she has chest pain, EKG performed. RONEL Wright at bedside.
Yes

## 2024-03-18 NOTE — RAPID RESPONSE TEAM SUMMARY - NSSITUATIONBACKGROUNDRRT_GEN_ALL_CORE
63 year old female with hx of Polycythemia vera MAK 2+ (2012) w/ secondary myelofibrosis, ESRD on HD (M/W/F) via LUE AVF (2/2 chronic GN, started Dec 2017), splenic vein thrombosis (2015), who presented to the ED for acute on subacute progressive SOB over 2 weeks, with sudden worsening the morning of admission. RRT called for hypertensive emergency c/f SCAPE. I speak French, patient denies hx of htn and reports adherence to HD, last session on Friday.  Received IV lasix, SLN nitroglycerine on admission, along with IV abx. cardiac enzymes, and infectious eval sent. Per primary team, MICU already consulted, and nephrology was consulted, awaiting HD however cannot go to HD unit on nitroglycerine gtt.

## 2024-03-18 NOTE — ED PROVIDER NOTE - ATTENDING CONTRIBUTION TO CARE
63 year old female with hx of Polycythemia vera  MAK 2 + since 2012, htn, ESRD on HD (M/W/F) via LUE AVF (2/2 chronic GN, started Dec 2017), splenic vein thrombosis (2015) pw sob. Due for HD today, symptoms x2 weeks.  on exam, ronchi throughout lung fields with increased wob.   Port right chest, fistula lue appropriate thrill  Plan diuresis, afterload reduction, bipap  consult neph for hd  admit      Critical care 30 minutes for resp distress    I read ekg as sinus tach rate 119, no st elevation or depression, qtc 464, narrow qrs, normal axis. 63 year old female with hx of Polycythemia vera  MAK 2 + since 2012, htn, ESRD on HD (M/W/F) via LUE AVF (2/2 chronic GN, started Dec 2017), splenic vein thrombosis (2015) pw sob. Due for HD today, symptoms x2 weeks.  on exam, ronchi throughout lung fields with increased wob.   Port right chest, fistula lue appropriate thrill  Likely vascular congestion. Less likely acs, less likely pna.  Plan diuresis, afterload reduction, bipap  consult neph for hd  admit      Critical care 30 minutes for resp distress    I read ekg as sinus tach rate 119, no st elevation or depression, qtc 464, narrow qrs, normal axis.

## 2024-03-18 NOTE — H&P ADULT - HISTORY OF PRESENT ILLNESS
63 year old female with hx of polycythemia, ESRD, on HD M/W/F presenting with shortness of breath.   Patient states she has had progressively worsening shortness of breath over the last 2 weeks. Last HD Friday.   Patient woke up this morning, urinated and subsequently had acute onset shortness of breath.   Patient denies chest pain, vomiting, abdominal pain, fevers, coughing, sick contacts.    In thee d patient had CT chest shows Fluid overload with small bilateral pleural effusions with or without   superimposed infectious/inflammatory process. Partially imaged   multiseptated cystic regions right lower lobe

## 2024-03-18 NOTE — ED ADULT TRIAGE NOTE - SOURCE OF INFORMATION
- Start the antibiotic  - continue Flonase and saline nasal rinse   - Take a Zyrtec daily   - follow up next week if no improvement or no sooner if symptoms worsen   
Patient

## 2024-03-19 LAB
A1C WITH ESTIMATED AVERAGE GLUCOSE RESULT: 4.3 % — SIGNIFICANT CHANGE UP (ref 4–5.6)
ADD ON TEST-SPECIMEN IN LAB: SIGNIFICANT CHANGE UP
ADD ON TEST-SPECIMEN IN LAB: SIGNIFICANT CHANGE UP
ALBUMIN SERPL ELPH-MCNC: 4.1 G/DL
ALBUMIN SERPL ELPH-MCNC: 5 G/DL — SIGNIFICANT CHANGE UP (ref 3.3–5)
ALP BLD-CCNC: 89 U/L
ALP SERPL-CCNC: 131 U/L — HIGH (ref 40–120)
ALT FLD-CCNC: 15 U/L — SIGNIFICANT CHANGE UP (ref 10–45)
ALT SERPL-CCNC: 16 U/L
ANION GAP SERPL CALC-SCNC: 18 MMOL/L
ANION GAP SERPL CALC-SCNC: 21 MMOL/L — HIGH (ref 5–17)
ANISOCYTOSIS BLD QL: SIGNIFICANT CHANGE UP
APTT BLD: 39.2 SEC — HIGH (ref 24.5–35.6)
AST SERPL-CCNC: 21 U/L
AST SERPL-CCNC: 27 U/L — SIGNIFICANT CHANGE UP (ref 10–40)
BASOPHILS # BLD AUTO: 0.26 K/UL — HIGH (ref 0–0.2)
BASOPHILS NFR BLD AUTO: 0.9 % — SIGNIFICANT CHANGE UP (ref 0–2)
BILIRUB DIRECT SERPL-MCNC: 0.6 MG/DL — HIGH (ref 0–0.3)
BILIRUB INDIRECT SERPL-MCNC: 3.2 MG/DL
BILIRUB SERPL-MCNC: 3.7 MG/DL
BILIRUB SERPL-MCNC: 5.6 MG/DL — HIGH (ref 0.2–1.2)
BUN SERPL-MCNC: 26 MG/DL
BUN SERPL-MCNC: 9 MG/DL — SIGNIFICANT CHANGE UP (ref 7–23)
BURR CELLS BLD QL SMEAR: PRESENT — SIGNIFICANT CHANGE UP
CALCIUM SERPL-MCNC: 10 MG/DL — SIGNIFICANT CHANGE UP (ref 8.4–10.5)
CALCIUM SERPL-MCNC: 9 MG/DL
CHLORIDE SERPL-SCNC: 92 MMOL/L — LOW (ref 96–108)
CHLORIDE SERPL-SCNC: 96 MMOL/L
CO2 SERPL-SCNC: 21 MMOL/L
CO2 SERPL-SCNC: 21 MMOL/L — LOW (ref 22–31)
CREAT SERPL-MCNC: 1.94 MG/DL — HIGH (ref 0.5–1.3)
CREAT SERPL-MCNC: 5.95 MG/DL
CULTURE RESULTS: SIGNIFICANT CHANGE UP
DACRYOCYTES BLD QL SMEAR: SLIGHT — SIGNIFICANT CHANGE UP
EGFR: 29 ML/MIN/1.73M2 — LOW
EGFR: 7 ML/MIN/1.73M2
ELLIPTOCYTES BLD QL SMEAR: SLIGHT — SIGNIFICANT CHANGE UP
EOSINOPHIL # BLD AUTO: 0.26 K/UL — SIGNIFICANT CHANGE UP (ref 0–0.5)
EOSINOPHIL NFR BLD AUTO: 0.9 % — SIGNIFICANT CHANGE UP (ref 0–6)
ESTIMATED AVERAGE GLUCOSE: 77 MG/DL — SIGNIFICANT CHANGE UP (ref 68–114)
FLUAV AG NPH QL: SIGNIFICANT CHANGE UP
FLUBV AG NPH QL: SIGNIFICANT CHANGE UP
GAS PNL BLDA: SIGNIFICANT CHANGE UP
GIANT PLATELETS BLD QL SMEAR: PRESENT — SIGNIFICANT CHANGE UP
GLUCOSE SERPL-MCNC: 119 MG/DL
GLUCOSE SERPL-MCNC: 124 MG/DL — HIGH (ref 70–99)
HAPTOGLOB SERPL-MCNC: <20 MG/DL
HCT VFR BLD CALC: 46.5 % — HIGH (ref 34.5–45)
HGB BLD-MCNC: 14.7 G/DL — SIGNIFICANT CHANGE UP (ref 11.5–15.5)
INR BLD: 1.26 RATIO — HIGH (ref 0.85–1.18)
LDH SERPL-CCNC: 339 U/L
LYMPHOCYTES # BLD AUTO: 0.78 K/UL — LOW (ref 1–3.3)
LYMPHOCYTES # BLD AUTO: 2.7 % — LOW (ref 13–44)
MAGNESIUM SERPL-MCNC: 1.9 MG/DL — SIGNIFICANT CHANGE UP (ref 1.6–2.6)
MANUAL SMEAR VERIFICATION: SIGNIFICANT CHANGE UP
MCHC RBC-ENTMCNC: 29.8 PG — SIGNIFICANT CHANGE UP (ref 27–34)
MCHC RBC-ENTMCNC: 31.6 GM/DL — LOW (ref 32–36)
MCV RBC AUTO: 94.1 FL — SIGNIFICANT CHANGE UP (ref 80–100)
MONOCYTES # BLD AUTO: 1.02 K/UL — HIGH (ref 0–0.9)
MONOCYTES NFR BLD AUTO: 3.5 % — SIGNIFICANT CHANGE UP (ref 2–14)
MRSA PCR RESULT.: SIGNIFICANT CHANGE UP
NEUTROPHILS # BLD AUTO: 26.7 K/UL — HIGH (ref 1.8–7.4)
NEUTROPHILS NFR BLD AUTO: 92 % — HIGH (ref 43–77)
OVALOCYTES BLD QL SMEAR: SLIGHT — SIGNIFICANT CHANGE UP
PHOSPHATE SERPL-MCNC: 1.7 MG/DL — LOW (ref 2.5–4.5)
PLAT MORPH BLD: NORMAL — SIGNIFICANT CHANGE UP
PLATELET # BLD AUTO: 221 K/UL — SIGNIFICANT CHANGE UP (ref 150–400)
POIKILOCYTOSIS BLD QL AUTO: SIGNIFICANT CHANGE UP
POLYCHROMASIA BLD QL SMEAR: SLIGHT — SIGNIFICANT CHANGE UP
POTASSIUM SERPL-MCNC: 3.1 MMOL/L — LOW (ref 3.5–5.3)
POTASSIUM SERPL-SCNC: 3.1 MMOL/L — LOW (ref 3.5–5.3)
POTASSIUM SERPL-SCNC: 5.4 MMOL/L
PROT SERPL-MCNC: 6.9 G/DL
PROT SERPL-MCNC: 9.2 G/DL — HIGH (ref 6–8.3)
PROTHROM AB SERPL-ACNC: 13.1 SEC — HIGH (ref 9.5–13)
RBC # BLD: 4.94 M/UL — SIGNIFICANT CHANGE UP (ref 3.8–5.2)
RBC # FLD: 21.3 % — HIGH (ref 10.3–14.5)
RBC BLD AUTO: ABNORMAL
RSV RNA NPH QL NAA+NON-PROBE: SIGNIFICANT CHANGE UP
S AUREUS DNA NOSE QL NAA+PROBE: SIGNIFICANT CHANGE UP
SARS-COV-2 RNA SPEC QL NAA+PROBE: SIGNIFICANT CHANGE UP
SODIUM SERPL-SCNC: 134 MMOL/L — LOW (ref 135–145)
SODIUM SERPL-SCNC: 135 MMOL/L
SPECIMEN SOURCE: SIGNIFICANT CHANGE UP
WBC # BLD: 29.02 K/UL — HIGH (ref 3.8–10.5)
WBC # FLD AUTO: 29.02 K/UL — HIGH (ref 3.8–10.5)

## 2024-03-19 PROCEDURE — 99232 SBSQ HOSP IP/OBS MODERATE 35: CPT | Mod: GC

## 2024-03-19 PROCEDURE — 93308 TTE F-UP OR LMTD: CPT | Mod: 26

## 2024-03-19 PROCEDURE — 99291 CRITICAL CARE FIRST HOUR: CPT

## 2024-03-19 PROCEDURE — 99223 1ST HOSP IP/OBS HIGH 75: CPT

## 2024-03-19 PROCEDURE — 76604 US EXAM CHEST: CPT | Mod: 26

## 2024-03-19 RX ORDER — POTASSIUM CHLORIDE 20 MEQ
40 PACKET (EA) ORAL ONCE
Refills: 0 | Status: COMPLETED | OUTPATIENT
Start: 2024-03-19 | End: 2024-03-19

## 2024-03-19 RX ORDER — CEFTRIAXONE 500 MG/1
1000 INJECTION, POWDER, FOR SOLUTION INTRAMUSCULAR; INTRAVENOUS ONCE
Refills: 0 | Status: COMPLETED | OUTPATIENT
Start: 2024-03-19 | End: 2024-03-19

## 2024-03-19 RX ORDER — CEFTRIAXONE 500 MG/1
INJECTION, POWDER, FOR SOLUTION INTRAMUSCULAR; INTRAVENOUS
Refills: 0 | Status: DISCONTINUED | OUTPATIENT
Start: 2024-03-19 | End: 2024-03-25

## 2024-03-19 RX ORDER — CEFTRIAXONE 500 MG/1
1000 INJECTION, POWDER, FOR SOLUTION INTRAMUSCULAR; INTRAVENOUS EVERY 24 HOURS
Refills: 0 | Status: DISCONTINUED | OUTPATIENT
Start: 2024-03-20 | End: 2024-03-25

## 2024-03-19 RX ORDER — ACETAMINOPHEN 500 MG
650 TABLET ORAL ONCE
Refills: 0 | Status: COMPLETED | OUTPATIENT
Start: 2024-03-19 | End: 2024-03-19

## 2024-03-19 RX ADMIN — PIPERACILLIN AND TAZOBACTAM 25 GRAM(S): 4; .5 INJECTION, POWDER, LYOPHILIZED, FOR SOLUTION INTRAVENOUS at 05:35

## 2024-03-19 RX ADMIN — URSODIOL 300 MILLIGRAM(S): 250 TABLET, FILM COATED ORAL at 17:31

## 2024-03-19 RX ADMIN — Medication 650 MILLIGRAM(S): at 07:49

## 2024-03-19 RX ADMIN — CARVEDILOL PHOSPHATE 3.12 MILLIGRAM(S): 80 CAPSULE, EXTENDED RELEASE ORAL at 17:31

## 2024-03-19 RX ADMIN — CEFTRIAXONE 1000 MILLIGRAM(S): 500 INJECTION, POWDER, FOR SOLUTION INTRAMUSCULAR; INTRAVENOUS at 12:06

## 2024-03-19 RX ADMIN — HEPARIN SODIUM 5000 UNIT(S): 5000 INJECTION INTRAVENOUS; SUBCUTANEOUS at 05:33

## 2024-03-19 RX ADMIN — HEPARIN SODIUM 5000 UNIT(S): 5000 INJECTION INTRAVENOUS; SUBCUTANEOUS at 13:39

## 2024-03-19 RX ADMIN — HEPARIN SODIUM 5000 UNIT(S): 5000 INJECTION INTRAVENOUS; SUBCUTANEOUS at 00:15

## 2024-03-19 RX ADMIN — AZITHROMYCIN 255 MILLIGRAM(S): 500 TABLET, FILM COATED ORAL at 13:40

## 2024-03-19 RX ADMIN — Medication 650 MILLIGRAM(S): at 08:45

## 2024-03-19 RX ADMIN — URSODIOL 300 MILLIGRAM(S): 250 TABLET, FILM COATED ORAL at 05:36

## 2024-03-19 RX ADMIN — HEPARIN SODIUM 5000 UNIT(S): 5000 INJECTION INTRAVENOUS; SUBCUTANEOUS at 21:20

## 2024-03-19 RX ADMIN — Medication 40 MILLIEQUIVALENT(S): at 17:31

## 2024-03-19 RX ADMIN — PIPERACILLIN AND TAZOBACTAM 200 GRAM(S): 4; .5 INJECTION, POWDER, LYOPHILIZED, FOR SOLUTION INTRAVENOUS at 00:00

## 2024-03-19 RX ADMIN — CARVEDILOL PHOSPHATE 3.12 MILLIGRAM(S): 80 CAPSULE, EXTENDED RELEASE ORAL at 05:36

## 2024-03-19 RX ADMIN — PIPERACILLIN AND TAZOBACTAM 3.38 GRAM(S): 4; .5 INJECTION, POWDER, LYOPHILIZED, FOR SOLUTION INTRAVENOUS at 05:38

## 2024-03-19 NOTE — PROGRESS NOTE ADULT - SUBJECTIVE AND OBJECTIVE BOX
Date of Service: 03-19-24 @ 16:10    Patient is a 63y old  Female who presents with a chief complaint of SOB x 2 weeks (19 Mar 2024 12:59)      Any change in ROS: s/p emergent HD in MICU  and now transferred out:  she was on bipap also  : currently awake and alert   todays ABG with mild ac hypercarbic resp alkalosis ; on rx for cap      MEDICATIONS  (STANDING):  azithromycin  IVPB      carvedilol 3.125 milliGRAM(s) Oral every 12 hours  cefTRIAXone   IVPB      heparin   Injectable 5000 Unit(s) SubCutaneous every 8 hours  ursodiol Capsule 300 milliGRAM(s) Oral two times a day    MEDICATIONS  (PRN):    Vital Signs Last 24 Hrs  T(C): 36.9 (19 Mar 2024 15:00), Max: 37 (19 Mar 2024 00:00)  T(F): 98.5 (19 Mar 2024 15:00), Max: 98.6 (19 Mar 2024 00:00)  HR: 92 (19 Mar 2024 15:00) (89 - 152)  BP: 118/73 (19 Mar 2024 15:00) (104/53 - 200/128)  BP(mean): 83 (19 Mar 2024 13:00) (76 - 150)  RR: 18 (19 Mar 2024 15:00) (18 - 40)  SpO2: 95% (19 Mar 2024 15:00) (89% - 100%)    Parameters below as of 19 Mar 2024 15:00  Patient On (Oxygen Delivery Method): nasal cannula  O2 Flow (L/min): 2      I&O's Summary    18 Mar 2024 07:01  -  19 Mar 2024 07:00  --------------------------------------------------------  IN: 1180 mL / OUT: 3300 mL / NET: -2120 mL    19 Mar 2024 07:01  -  19 Mar 2024 16:10  --------------------------------------------------------  IN: 620 mL / OUT: 0 mL / NET: 620 mL          Physical Exam:   GENERAL: NAD, well-groomed, well-developed  HEENT: CASEY/   Atraumatic, Normocephalic  ENMT: No tonsillar erythema, exudates, or enlargement; Moist mucous membranes, Good dentition, No lesions  NECK: Supple, No JVD, Normal thyroid  CHEST/LUNG: bibasilar cracekls  CVS: Regular rate and rhythm; No murmurs, rubs, or gallops  GI: : Soft, Nontender, Nondistended; Bowel sounds present  NERVOUS SYSTEM:  Alert & Oriented X3  EXTREMITIES: - edema  LYMPH: No lymphadenopathy noted  SKIN: No rashes or lesions  ENDOCRINOLOGY: No Thyromegaly  PSYCH: Appropriate    Labs:  ABG - ( 19 Mar 2024 00:11 )  pH, Arterial: 7.51  pH, Blood: x     /  pCO2: 31    /  pO2: 152   / HCO3: 25    / Base Excess: 2.5   /  SaO2: 98.7            21, 21  CARDIAC MARKERS ( 18 Mar 2024 22:24 )  x     / x     / x     / x     / 4.9 ng/mL  CARDIAC MARKERS ( 18 Mar 2024 17:20 )  x     / x     / 63 U/L / x     / 4.8 ng/mL                            14.7   29.02 )-----------( 221      ( 19 Mar 2024 00:17 )             46.5                         12.5   20.81 )-----------( 160      ( 18 Mar 2024 20:53 )             39.8                         14.3   18.09 )-----------( 183      ( 18 Mar 2024 08:54 )             47.3     03-19    134<L>  |  92<L>  |  9   ----------------------------<  124<H>  3.1<L>   |  21<L>  |  1.94<H>  03-18    135  |  105  |  37<H>  ----------------------------<  132<H>  5.5<H>   |  14<L>  |  6.70<H>  03-18    139  |  103  |  32<H>  ----------------------------<  129<H>  5.0   |  17<L>  |  6.40<H>  03-18    140  |  102  |  27<H>  ----------------------------<  105<H>  4.4   |  19<L>  |  6.10<H>    Ca    10.0      19 Mar 2024 00:17  Ca    9.0      18 Mar 2024 20:53  Ca    9.5      18 Mar 2024 17:20  Ca    9.6      18 Mar 2024 08:54  Phos  1.7     03-19  Phos  5.4     03-18  Mg     1.9     03-19  Mg     2.0     03-18  Mg     2.2     03-18    TPro  9.2<H>  /  Alb  5.0  /  TBili  5.6<H>  /  DBili  0.6<H>  /  AST  27  /  ALT  15  /  AlkPhos  131<H>  03-19  TPro  7.3  /  Alb  3.9  /  TBili  3.3<H>  /  DBili  x   /  AST  20  /  ALT  12  /  AlkPhos  98  03-18  TPro  8.4<H>  /  Alb  4.4  /  TBili  3.5<H>  /  DBili  x   /  AST  28  /  ALT  14  /  AlkPhos  112  03-18  TPro  8.5<H>  /  Alb  4.6  /  TBili  4.8<H>  /  DBili  x   /  AST  20  /  ALT  15  /  AlkPhos  111  03-18    CAPILLARY BLOOD GLUCOSE          LIVER FUNCTIONS - ( 19 Mar 2024 00:17 )  Alb: 5.0 g/dL / Pro: 9.2 g/dL / ALK PHOS: 131 U/L / ALT: 15 U/L / AST: 27 U/L / GGT: x           PT/INR - ( 19 Mar 2024 00:17 )   PT: 13.1 sec;   INR: 1.26 ratio         PTT - ( 19 Mar 2024 00:17 )  PTT:39.2 sec  Urinalysis Basic - ( 19 Mar 2024 00:17 )    Color: x / Appearance: x / SG: x / pH: x  Gluc: 124 mg/dL / Ketone: x  / Bili: x / Urobili: x   Blood: x / Protein: x / Nitrite: x   Leuk Esterase: x / RBC: x / WBC x   Sq Epi: x / Non Sq Epi: x / Bacteria: x      Procalcitonin, Serum: 0.76 ng/mL (03-18 @ 17:20)  Lactate, Blood: 0.8 mmol/L (03-18 @ 12:39)        RECENT CULTURES:  03-18 @ 10:46 Clean Catch Clean Catch (Midstream)                <10,000 CFU/mL Normal Urogenital Caroline    03-18 @ 10:12 .Blood Blood-Peripheral         rad< from: Xray Chest 1 View- PORTABLE-Urgent (Xray Chest 1 View- PORTABLE-Urgent .) (03.18.24 @ 09:45) >    INTERPRETATION:  CLINICAL HISTORY: Shortness of breath.    COMPARISON: Chest radiograph dated 11/26/2023.    TECHNIQUE: Portable frontal chest radiograph. Adequate positioning.    FINDINGS:    Support devices: Left axillary and left subclavian/brachiocephalic   stents.  Right-sided chest wall catheter with tip terminating in the   region of the superior cavoatrial junction.    Cardiac/mediastinum/hilum: Heart size is poorly evaluated on AP   projection however the cardiac silhouette appears enlarged.    Lung parenchyma/Pleura: Diffuse increased interstitial markings and   peribronchial cuffing consistent with pulmonary edema. No visualized   pleural effusions or pneumothorax.    Skeleton/soft tissues: No acute pathology.      IMPRESSION:    There is increased interstitial markings and peribronchial cuffing   consistent with pulmonary edema.    --- End of Report ---           BROOK ALLEN MD; Resident Radiologist  This document has been electronically signed.   SHARMAINE LOPEZ DO; Attending Radiologist  This document has been electronically signed. Mar 18 2024 10:55AM    < end of copied text >  < from: CT Chest No Cont (03.18.24 @ 09:21) >  pleural effusions with adjacent dependent patchy opacity. Partially   imaged multiseptated cystic regions and right lower lobe.  MEDIASTINUM AND DAVION: Calcified mediastinal and right hilar lymph nodes.  VESSELS: Left subclavian graft. Right-sided central line with tip in the   SVC.  HEART: Heart is mildly enlarged No pericardial effusion.  CHEST WALL AND LOWER NECK: Within normal limits.  VISUALIZED UPPER ABDOMEN: Significantly enlarged spleen.  BONES: Degenerative changes.    IMPRESSION:  Fluid overload with small bilateral pleural effusions with or without   superimposed infectious/inflammatory process. Partially imaged   multiseptated cystic regions right lower lobe. Recommend short-term   follow-up CT .    --- End of Report ---    < end of copied text >         No growth at 24 hours    03-18 @ 09:50 .Blood Blood-Peripheral                No growth at 24 hours          RESPIRATORY CULTURES:          Studies  Chest X-RAY  CT SCAN Chest   Venous Dopplers: LE:   CT Abdomen  Others

## 2024-03-19 NOTE — PHYSICAL THERAPY INITIAL EVALUATION ADULT - GAIT TRAINING, PT EVAL
GOAL: Pt will ambulate 250 feet w/independence, w/use of appropriate assistive device in 2 weeks Yes - the patient is able to be screened

## 2024-03-19 NOTE — CHART NOTE - NSCHARTNOTEFT_GEN_A_CORE
MAR Accept Note  Transfer to:  Medicine  Accepting Attending Physician:  Michael  Assigned Room:  [address] [place] [address]    Patient seen and examined.   Labs and data reviewed.   No findings precluding transfer of service.       HPI/MICU COURSE:   Please refer to MICU transfer note for full details. Briefly, this is a 63 y.o. 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 SOB. Patient endorses progressively worsening SOB over the last 2 weeks. Per pt, after urinating this AM she subsequently developed acute onset of SOB. She does also c.o. a headache, but denies F/C, coughing, vomiting however, endorsing nausea, otherwise, denies CP, abd pain, sick contacts. Of note, last HD Friday 03/15/24. Pt denies changes in diet or increased intake of salty foods.     In the ED, pt negative for Covid, flu, RSV. Labs significant for WBC: 18.09, initial bicarb of 19, redraw of 17, initial trop of 26, repeat of 35, anion gap of 17, procal of 0.76, [place] [address]. CT Head negative for acute infarction or hemorrhage, CT chest notable for fluid overload status with small b/l pleural effusions. CXR demonstrates increased interstitial markings and peribronchial cuffing consistent with pulmonary edema. Started on Ceftriaxone and azithromycin for PNA. Pt admitted to MICU for acute hypoxemic respiratory failure likely 2/2 to fluid overload status, possibly PNA.     Pt had 3.3L removed via urgent HD in MICU w/ improvement in SOB. Pt was discontinued off of zosyn 3/18 and started on CTX. HD scheduled for 3/20/24. GI was consulted as pt was pending outpt EGD for varices and colonoscopy for diarrhea. GI recommended calling back once pt is stable on floors.     Patient is hemodynamically stable and ready for transfer out of the ICU.      FOR FOLLOW UP:  [ ] F/u GI PCR, stool culture, sputum culture  [ ] Re-consult GI for EGD and colonoscopy  [ ] F/u Heme recs  [ ] F/u TTE  [ ] F/u nephro recs.

## 2024-03-19 NOTE — PHYSICAL THERAPY INITIAL EVALUATION ADULT - PERTINENT HX OF CURRENT PROBLEM, REHAB EVAL
63 y.o. 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 SOB, progressively worsening SOB over the last 2 weeks. In the ED, found to be fluid overloaded on CT chest, with small b/l plural effusions, WBC: 18.09, [place] [address]. Admitted to MICU for acute hypoxemic respiratory failure likely 2/2 to fluid overload status, possibly PNA, and improved with emergent HD on 3/18. 63 y.o. 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 SOB, progressively worsening SOB over the last 2 weeks. In the ED, found to be fluid overloaded on CT chest, with small b/l plural effusions, WBC: 18.09, [place] of [address]. Admitted to MICU for acute hypoxemic respiratory failure likely 2/2 to fluid overload status, possibly PNA, and improved with emergent HD on 3/18.  -CT head: Mild chronic microvascular changes without evidence of an acute transcortical infarction or hemorrhage.  -CXR: There is increased interstitial markings and peribronchial cuffing consistent with pulmonary edema.  -CT chest 3/18: Fluid overload with small bilateral pleural effusions with or without superimposed infectious/inflammatory process. Partially imaged multiseptated cystic regions right lower lobe.

## 2024-03-19 NOTE — PROGRESS NOTE ADULT - SUBJECTIVE AND OBJECTIVE BOX
*******************************  Jennifer Salter PGY-2  *******************************    INTERVAL HPI/OVERNIGHT EVENTS:    SUBJECTIVE: Patient seen and examined at bedside.     CONSTITUTIONAL: No weakness, fevers or chills  EYES/ENT: No visual changes;  No vertigo or throat pain   NECK: No pain or stiffness  RESPIRATORY: No cough, wheezing, hemoptysis; No shortness of breath  CARDIOVASCULAR: No chest pain or palpitations  GASTROINTESTINAL: No abdominal or epigastric pain. No nausea, vomiting, or hematemesis; No diarrhea or constipation. No melena or hematochezia.  GENITOURINARY: No dysuria, frequency or hematuria  NEUROLOGICAL: No numbness or weakness  SKIN: No itching, rashes    OBJECTIVE:    VITAL SIGNS:  ICU Vital Signs Last 24 Hrs  T(C): 37 (19 Mar 2024 04:00), Max: 37 (19 Mar 2024 00:00)  T(F): 98.6 (19 Mar 2024 04:00), Max: 98.6 (19 Mar 2024 00:00)  HR: 98 (19 Mar 2024 06:45) (96 - 152)  BP: 124/75 (19 Mar 2024 06:45) (107/64 - 208/125)  BP(mean): 93 (19 Mar 2024 06:45) (78 - 150)  ABP: --  ABP(mean): --  RR: 18 (19 Mar 2024 06:45) (18 - 40)  SpO2: 92% (19 Mar 2024 06:45) (89% - 100%)    O2 Parameters below as of 19 Mar 2024 04:00  Patient On (Oxygen Delivery Method): nasal cannula  O2 Flow (L/min): 2            03-18 @ 07:01  -  03-19 @ 06:55  --------------------------------------------------------  IN: 1180 mL / OUT: 3300 mL / NET: -2120 mL      CAPILLARY BLOOD GLUCOSE            PHYSICAL EXAM:  GENERAL: NAD, well-developed  HEAD:  Atraumatic, Normocephalic  EYES: EOMI, PERRLA, conjunctiva and sclera clear  NECK: Supple, No JVD  CHEST/LUNG: Clear to auscultation bilaterally; No wheeze  HEART: Regular rate and rhythm; No murmurs, rubs, or gallops  ABDOMEN: Soft, Nontender, Nondistended; Bowel sounds present  EXTREMITIES:  2+ Peripheral Pulses, No clubbing, cyanosis, or edema  PSYCH: AAOx3  NEUROLOGY: non-focal  SKIN: No rashes or lesions  Lines:      MEDICATIONS:  MEDICATIONS  (STANDING):  azithromycin  IVPB      azithromycin  IVPB 500 milliGRAM(s) IV Intermittent once  carvedilol 3.125 milliGRAM(s) Oral every 12 hours  heparin   Injectable 5000 Unit(s) SubCutaneous every 8 hours  piperacillin/tazobactam IVPB.. 3.375 Gram(s) IV Intermittent every 12 hours  ursodiol Capsule 300 milliGRAM(s) Oral two times a day    MEDICATIONS  (PRN):      ALLERGIES:  Allergies    No Known Allergies    Intolerances        LABS:                        14.7   29.02 )-----------( 221      ( 19 Mar 2024 00:17 )             46.5     03-19    134<L>  |  92<L>  |  9   ----------------------------<  124<H>  3.1<L>   |  21<L>  |  1.94<H>    Ca    10.0      19 Mar 2024 00:17  Phos  1.7     03-19  Mg     1.9     03-19    TPro  9.2<H>  /  Alb  5.0  /  TBili  5.6<H>  /  DBili  0.6<H>  /  AST  27  /  ALT  15  /  AlkPhos  131<H>  03-19    PT/INR - ( 19 Mar 2024 00:17 )   PT: 13.1 sec;   INR: 1.26 ratio         PTT - ( 19 Mar 2024 00:17 )  PTT:39.2 sec  Urinalysis Basic - ( 19 Mar 2024 00:17 )    Color: x / Appearance: x / SG: x / pH: x  Gluc: 124 mg/dL / Ketone: x  / Bili: x / Urobili: x   Blood: x / Protein: x / Nitrite: x   Leuk Esterase: x / RBC: x / WBC x   Sq Epi: x / Non Sq Epi: x / Bacteria: x        RADIOLOGY & ADDITIONAL TESTS: Reviewed.     *******************************  Jennifer Salter PGY-2  *******************************    INTERVAL HPI/OVERNIGHT EVENTS: Completed emergent HD.     SUBJECTIVE: Patient seen and examined at bedside with daughter. States she feels improved, now on 4L NC. States headache resolved with tylenol, denies current chest pain, SOB. nausea, vomiting, abdominal pain.      CONSTITUTIONAL: No weakness, fevers or chills  EYES/ENT: No visual changes;  No vertigo or throat pain   NECK: No pain or stiffness  RESPIRATORY: No cough, wheezing, hemoptysis; No shortness of breath  CARDIOVASCULAR: No chest pain or palpitations  GASTROINTESTINAL: No abdominal or epigastric pain. No nausea, vomiting, or hematemesis; No diarrhea or constipation. No melena or hematochezia.  GENITOURINARY: No dysuria, frequency or hematuria  NEUROLOGICAL: No numbness or weakness  SKIN: No itching, rashes    OBJECTIVE:    VITAL SIGNS:  ICU Vital Signs Last 24 Hrs  T(C): 37 (19 Mar 2024 04:00), Max: 37 (19 Mar 2024 00:00)  T(F): 98.6 (19 Mar 2024 04:00), Max: 98.6 (19 Mar 2024 00:00)  HR: 98 (19 Mar 2024 06:45) (96 - 152)  BP: 124/75 (19 Mar 2024 06:45) (107/64 - 208/125)  BP(mean): 93 (19 Mar 2024 06:45) (78 - 150)  ABP: --  ABP(mean): --  RR: 18 (19 Mar 2024 06:45) (18 - 40)  SpO2: 92% (19 Mar 2024 06:45) (89% - 100%)    O2 Parameters below as of 19 Mar 2024 04:00  Patient On (Oxygen Delivery Method): nasal cannula  O2 Flow (L/min): 2            03-18 @ 07:01  -  03-19 @ 06:55  --------------------------------------------------------  IN: 1180 mL / OUT: 3300 mL / NET: -2120 mL      CAPILLARY BLOOD GLUCOSE            PHYSICAL EXAM:  GENERAL: NAD, well-developed  HEAD:  Atraumatic, Normocephalic  EYES: EOMI, PERRLA, conjunctiva and sclera clear  NECK: Supple, No JVD  CHEST/LUNG: Clear to auscultation bilaterally; No wheeze  HEART: Regular rate and rhythm; No murmurs, rubs, or gallops  ABDOMEN: Soft, Nontender, Nondistended; Bowel sounds present  EXTREMITIES:  2+ Peripheral Pulses, No clubbing, cyanosis, or edema.   PSYCH: AAOx3  NEUROLOGY: non-focal  SKIN: No rashes or lesions  Lines:      MEDICATIONS:  MEDICATIONS  (STANDING):  azithromycin  IVPB      azithromycin  IVPB 500 milliGRAM(s) IV Intermittent once  carvedilol 3.125 milliGRAM(s) Oral every 12 hours  heparin   Injectable 5000 Unit(s) SubCutaneous every 8 hours  piperacillin/tazobactam IVPB.. 3.375 Gram(s) IV Intermittent every 12 hours  ursodiol Capsule 300 milliGRAM(s) Oral two times a day    MEDICATIONS  (PRN):      ALLERGIES:  Allergies    No Known Allergies    Intolerances        LABS:                        14.7   29.02 )-----------( 221      ( 19 Mar 2024 00:17 )             46.5     03-19    134<L>  |  92<L>  |  9   ----------------------------<  124<H>  3.1<L>   |  21<L>  |  1.94<H>    Ca    10.0      19 Mar 2024 00:17  Phos  1.7     03-19  Mg     1.9     03-19    TPro  9.2<H>  /  Alb  5.0  /  TBili  5.6<H>  /  DBili  0.6<H>  /  AST  27  /  ALT  15  /  AlkPhos  131<H>  03-19    PT/INR - ( 19 Mar 2024 00:17 )   PT: 13.1 sec;   INR: 1.26 ratio         PTT - ( 19 Mar 2024 00:17 )  PTT:39.2 sec  Urinalysis Basic - ( 19 Mar 2024 00:17 )    Color: x / Appearance: x / SG: x / pH: x  Gluc: 124 mg/dL / Ketone: x  / Bili: x / Urobili: x   Blood: x / Protein: x / Nitrite: x   Leuk Esterase: x / RBC: x / WBC x   Sq Epi: x / Non Sq Epi: x / Bacteria: x        RADIOLOGY & ADDITIONAL TESTS: Reviewed.     *******************************  Jennifer Salter PGY-2  *******************************    INTERVAL HPI/OVERNIGHT EVENTS: Completed emergent HD.     SUBJECTIVE: Patient seen and examined at bedside with daughter. States she feels improved compared to yesterday, no SOB or increased WOB while on 4L NC at this time. States headache resolved with tylenol, denies current chest pain or abdominal pain.     CONSTITUTIONAL: No weakness, fevers or chills  EYES/ENT: No visual changes;  No vertigo or throat pain   NECK: No pain or stiffness  RESPIRATORY: No cough, wheezing, hemoptysis; No shortness of breath  CARDIOVASCULAR: No chest pain or palpitations  GASTROINTESTINAL: No abdominal or epigastric pain. No nausea, vomiting, or hematemesis; No diarrhea or constipation. No melena or hematochezia.  GENITOURINARY: No dysuria, frequency or hematuria  NEUROLOGICAL: No numbness or weakness  SKIN: No itching, rashes    OBJECTIVE:    VITAL SIGNS:  ICU Vital Signs Last 24 Hrs  T(C): 37 (19 Mar 2024 04:00), Max: 37 (19 Mar 2024 00:00)  T(F): 98.6 (19 Mar 2024 04:00), Max: 98.6 (19 Mar 2024 00:00)  HR: 98 (19 Mar 2024 06:45) (96 - 152)  BP: 124/75 (19 Mar 2024 06:45) (107/64 - 208/125)  BP(mean): 93 (19 Mar 2024 06:45) (78 - 150)  ABP: --  ABP(mean): --  RR: 18 (19 Mar 2024 06:45) (18 - 40)  SpO2: 92% (19 Mar 2024 06:45) (89% - 100%)    O2 Parameters below as of 19 Mar 2024 04:00  Patient On (Oxygen Delivery Method): nasal cannula  O2 Flow (L/min): 2            03-18 @ 07:01  -  03-19 @ 06:55  --------------------------------------------------------  IN: 1180 mL / OUT: 3300 mL / NET: -2120 mL      CAPILLARY BLOOD GLUCOSE            PHYSICAL EXAM:  GENERAL: NAD, well-developed  HEAD:  Atraumatic, Normocephalic  EYES: EOMI, PERRLA, conjunctiva and sclera clear  NECK: Supple, No JVD  CHEST/LUNG: Clear to auscultation bilaterally; No wheeze  HEART: Regular rate and rhythm; No murmurs, rubs, or gallops  ABDOMEN: Soft, Nontender, Nondistended; Bowel sounds present  EXTREMITIES:  2+ Peripheral Pulses, No clubbing, cyanosis, or edema.   PSYCH: AAOx3  NEUROLOGY: non-focal  SKIN: No rashes or lesions  Lines:      MEDICATIONS:  MEDICATIONS  (STANDING):  azithromycin  IVPB      azithromycin  IVPB 500 milliGRAM(s) IV Intermittent once  carvedilol 3.125 milliGRAM(s) Oral every 12 hours  heparin   Injectable 5000 Unit(s) SubCutaneous every 8 hours  piperacillin/tazobactam IVPB.. 3.375 Gram(s) IV Intermittent every 12 hours  ursodiol Capsule 300 milliGRAM(s) Oral two times a day    MEDICATIONS  (PRN):      ALLERGIES:  Allergies    No Known Allergies    Intolerances        LABS:                        14.7   29.02 )-----------( 221      ( 19 Mar 2024 00:17 )             46.5     03-19    134<L>  |  92<L>  |  9   ----------------------------<  124<H>  3.1<L>   |  21<L>  |  1.94<H>    Ca    10.0      19 Mar 2024 00:17  Phos  1.7     03-19  Mg     1.9     03-19    TPro  9.2<H>  /  Alb  5.0  /  TBili  5.6<H>  /  DBili  0.6<H>  /  AST  27  /  ALT  15  /  AlkPhos  131<H>  03-19    PT/INR - ( 19 Mar 2024 00:17 )   PT: 13.1 sec;   INR: 1.26 ratio         PTT - ( 19 Mar 2024 00:17 )  PTT:39.2 sec  Urinalysis Basic - ( 19 Mar 2024 00:17 )    Color: x / Appearance: x / SG: x / pH: x  Gluc: 124 mg/dL / Ketone: x  / Bili: x / Urobili: x   Blood: x / Protein: x / Nitrite: x   Leuk Esterase: x / RBC: x / WBC x   Sq Epi: x / Non Sq Epi: x / Bacteria: x        RADIOLOGY & ADDITIONAL TESTS: Reviewed.     *******************************  Jennifer Salter PGY-2  *******************************    INTERVAL HPI/OVERNIGHT EVENTS: Completed emergent HD.     SUBJECTIVE: Patient seen and examined at bedside with daughter. States she feels improved compared to yesterday, no SOB or increased WOB while on 4L NC at this time, and improved cough with decreased white phlegm. States headache resolved with tylenol, denies current chest pain or abdominal pain.     Reports has been experiencing diarrhea for the past two months; thought to be due to ojjaara; however, diarrhea has persisted with lower dose. Was planned for outpatient colonoscopy/EGD.     CONSTITUTIONAL: No weakness, fevers or chills  EYES/ENT: No visual changes;  No vertigo or throat pain   NECK: No pain or stiffness  RESPIRATORY: No wheezing, hemoptysis; No shortness of breath  CARDIOVASCULAR: No chest pain or palpitations  GASTROINTESTINAL: No abdominal or epigastric pain. No nausea, vomiting, or hematemesis; No melena or hematochezia.  GENITOURINARY: No dysuria, frequency or hematuria  NEUROLOGICAL: No numbness or weakness  SKIN: No itching, rashes    OBJECTIVE:    VITAL SIGNS:  ICU Vital Signs Last 24 Hrs  T(C): 37 (19 Mar 2024 04:00), Max: 37 (19 Mar 2024 00:00)  T(F): 98.6 (19 Mar 2024 04:00), Max: 98.6 (19 Mar 2024 00:00)  HR: 98 (19 Mar 2024 06:45) (96 - 152)  BP: 124/75 (19 Mar 2024 06:45) (107/64 - 208/125)  BP(mean): 93 (19 Mar 2024 06:45) (78 - 150)  ABP: --  ABP(mean): --  RR: 18 (19 Mar 2024 06:45) (18 - 40)  SpO2: 92% (19 Mar 2024 06:45) (89% - 100%)    O2 Parameters below as of 19 Mar 2024 04:00  Patient On (Oxygen Delivery Method): nasal cannula  O2 Flow (L/min): 2            03-18 @ 07:01  -  03-19 @ 06:55  --------------------------------------------------------  IN: 1180 mL / OUT: 3300 mL / NET: -2120 mL      CAPILLARY BLOOD GLUCOSE            PHYSICAL EXAM:  GENERAL: NAD, well-developed  HEAD:  Atraumatic, Normocephalic  EYES: EOMI, PERRLA, conjunctiva and sclera clear  NECK: Supple, No JVD  CHEST/LUNG: Clear to auscultation bilaterally; No wheeze  HEART: Regular rate and rhythm; No murmurs, rubs, or gallops  ABDOMEN: Soft, Nontender, Nondistended; Bowel sounds present  EXTREMITIES:  2+ Peripheral Pulses, No clubbing, cyanosis, or edema.   PSYCH: AAOx3  NEUROLOGY: non-focal  SKIN: No rashes or lesions  Lines:      MEDICATIONS:  MEDICATIONS  (STANDING):  azithromycin  IVPB      azithromycin  IVPB 500 milliGRAM(s) IV Intermittent once  carvedilol 3.125 milliGRAM(s) Oral every 12 hours  heparin   Injectable 5000 Unit(s) SubCutaneous every 8 hours  piperacillin/tazobactam IVPB.. 3.375 Gram(s) IV Intermittent every 12 hours  ursodiol Capsule 300 milliGRAM(s) Oral two times a day    MEDICATIONS  (PRN):      ALLERGIES:  Allergies    No Known Allergies    Intolerances        LABS:                        14.7   29.02 )-----------( 221      ( 19 Mar 2024 00:17 )             46.5     03-19    134<L>  |  92<L>  |  9   ----------------------------<  124<H>  3.1<L>   |  21<L>  |  1.94<H>    Ca    10.0      19 Mar 2024 00:17  Phos  1.7     03-19  Mg     1.9     03-19    TPro  9.2<H>  /  Alb  5.0  /  TBili  5.6<H>  /  DBili  0.6<H>  /  AST  27  /  ALT  15  /  AlkPhos  131<H>  03-19    PT/INR - ( 19 Mar 2024 00:17 )   PT: 13.1 sec;   INR: 1.26 ratio         PTT - ( 19 Mar 2024 00:17 )  PTT:39.2 sec  Urinalysis Basic - ( 19 Mar 2024 00:17 )    Color: x / Appearance: x / SG: x / pH: x  Gluc: 124 mg/dL / Ketone: x  / Bili: x / Urobili: x   Blood: x / Protein: x / Nitrite: x   Leuk Esterase: x / RBC: x / WBC x   Sq Epi: x / Non Sq Epi: x / Bacteria: x        RADIOLOGY & ADDITIONAL TESTS: Reviewed.

## 2024-03-19 NOTE — PATIENT PROFILE ADULT - DO YOU FEEL UNSAFE AT HOME, WORK, OR SCHOOL?
eMERGENCY dEPARTMENT eNCOUnter      Pt Name: Gilberto Hogan  MRN: [id number]  [place] 2014  Date of evaluation: 1/19/2021      CHIEF COMPLAINT       Chief Complaint   Patient presents with    Fever    Emesis         HISTORY OF PRESENT ILLNESS    Gilberto Hogan is a 11 y.o. female who presents with fever. Mother states child had a low-grade temp to 100.9 today threw up once several hours ago none since that time eat or drink breakfast and lunch well complains of sore throat otherwise acting normal        REVIEW OF SYSTEMS       Review of systems are all reviewed and negative except stated above in HPI    PAST MEDICAL HISTORY    has no past medical history on file. SURGICAL HISTORY      has no past surgical history on file. CURRENT MEDICATIONS       Discharge Medication List as of 1/19/2021  9:16 PM      CONTINUE these medications which have NOT CHANGED    Details   diphenhydrAMINE-phenylephrine (BENADRYL ALLERGY CHILDRENS) 12.5-5 MG/5ML SOLN Take 5 mLs by mouth 3 times daily as needed (cough), Disp-118 mL,R-0Print      cetirizine HCl (ZYRTEC) 5 MG/5ML SOLN Take 5 mLs by mouth daily, Disp-50 mL, R-0Normal      acetaminophen (TYLENOL) 160 MG/5ML suspension Take 10.83 mLs by mouth every 6 hours as needed for Fever, Disp-240 mL, R-0Normal      nystatin (MYCOSTATIN) 453337 UNIT/GM ointment Apply topically 3 times daily until rash is gone, Disp-30 g, R-2, Normal             ALLERGIES     is allergic to seasonal.    FAMILY HISTORY     She indicated that the status of her father is unknown. She indicated that the status of her maternal grandmother is unknown. She indicated that the status of her maternal grandfather is unknown.     family history includes Diabetes in her maternal grandfather and maternal grandmother; High Blood Pressure in her father, maternal grandfather, and maternal grandmother. SOCIAL HISTORY      reports that she has never smoked.  She has never used smokeless tobacco. She APRN - CNP  [phone]    In 1 day        DISCHARGE MEDICATIONS:  Discharge Medication List as of 1/19/2021  9:16 PM          (Please note that portions of this note were completed with a voice recognition program.  Efforts were made to edit the dictations but occasionally words are mis-transcribed.)    You MD, F.A.C.E.P.   Attending Emergency Physician        Alphonso Kilpatrick MD  01/19/21 2191 no

## 2024-03-19 NOTE — PHYSICAL THERAPY INITIAL EVALUATION ADULT - GENERAL OBSERVATIONS, REHAB EVAL
Pt received semi-supine in bed, +3L o2 via NC, +cardiac monitoring, +IVL's, +Nepali and english speaking-daughter at bedside assisting with translation, A&Ox4, brendan 45 min PT eval.

## 2024-03-19 NOTE — PATIENT PROFILE ADULT - FALL HARM RISK - HARM RISK INTERVENTIONS

## 2024-03-19 NOTE — PROGRESS NOTE ADULT - ASSESSMENT
63 year old female with hx of polycythemia, ESRD, on HD M/W/F presenting with shortness of breath. Patient states she has had progressively worsening shortness of breath over the last 2 weeks. Patient woke up this morning, urinated and subsequently had acute onset shortness of breath. Patient denies chest pain, vomiting, abdominal pain, fevers, coughing, sick contacts. Patient has associated mild nausea. Patient complains of headache. patient states her last dialysis session was on Friday.  she says  she ias having chest tightness ofr last two week : yesterday when she went to bathroom she sweated a lot and she felt more sob  and hence came to hospital : she has no fever, cough  or phlegm:    SOB hypoxic resp failure  ESRD  Polycythemia    SOB hypoxic resp failure  -ABG today showed mild combined resp and met acidosis  -she is on 30% bipap:  she is alert and awake and I think  we can change to nasal cannula  -Keep o2 sao2 above 90% all the time:   -the ct scan chest is reviewed:  suggestive of chf:  -Her WBC c ount is high ; started on empiric antibiotics  follow all cultures:   -cont HD for fluid overload    3/19:  -she looks better:  on oxygen :   -getting antibiotics  s/p emergent HD   ESRD  -on hd   Polycythemia  -she was evaluted by [facility] on last admission in december fo 2023 and at that time it was considered the the changes they saw on PBS showed transfused cells;    -the hb is high this time given she has esrd:   -? repeat [facility] consult:   3/19:  -with rogression to myelofibrosis:  [facility] following   Splenomegaly:   -plan for ?egd per gi  :    dw team      dvtp rupa sanchez acp

## 2024-03-19 NOTE — PROGRESS NOTE ADULT - SUBJECTIVE AND OBJECTIVE BOX
[facility] Division of Kidney Diseases & Hypertension  FOLLOW UP NOTE  [phone]--------------------------------------------------------------------------------  Chief Complaint:Acute pulmonary edema        24 hour events/subjective:  Pt was seen at the bedside with family members in the room. Her respiratory status has improved now. She is off BiPAP and now is on NC oxygen supplementation. BP is better today. She tolerated the HD well yesterday and her status has improved after the HD.   Pt denies any worsening of SOB/ Constipation/ Diarrhea/ Nausea/ Vomiting/ abdominal pain/ chest pain/ tingling/ numbness.         PAST HISTORY  --------------------------------------------------------------------------------  No significant changes to PMH, PSH, FHx, SHx, unless otherwise noted    ALLERGIES & MEDICATIONS  --------------------------------------------------------------------------------  Allergies    No Known Allergies    Intolerances      Standing Inpatient Medications  azithromycin  IVPB      azithromycin  IVPB 500 milliGRAM(s) IV Intermittent once  carvedilol 3.125 milliGRAM(s) Oral every 12 hours  cefTRIAXone   IVPB      heparin   Injectable 5000 Unit(s) SubCutaneous every 8 hours  ursodiol Capsule 300 milliGRAM(s) Oral two times a day    PRN Inpatient Medications      REVIEW OF SYSTEMS  --------------------------------------------------------------------------------  as above.     VITALS/PHYSICAL EXAM  --------------------------------------------------------------------------------  T(C): 36.7 (03-19-24 @ 08:00), Max: 37 (03-19-24 @ 00:00)  HR: 94 (03-19-24 @ 12:00) (89 - 152)  BP: 144/69 (03-19-24 @ 12:00) (104/53 - 200/128)  RR: 25 (03-19-24 @ 12:00) (18 - 40)  SpO2: 94% (03-19-24 @ 12:00) (89% - 100%)  Wt(kg): --  Height (cm): 157.5 (03-18-24 @ 20:18)  Weight (kg): 52.7 (03-18-24 @ 20:18)  BMI (kg/m2): 21.2 (03-18-24 @ 20:18)  BSA (m2): 1.52 (03-18-24 @ 20:18)      03-18-24 @ 07:01  -  03-19-24 @ 07:00  --------------------------------------------------------  IN: 1180 mL / OUT: 3300 mL / NET: -2120 mL    03-19-24 @ 07:01  -  03-19-24 @ 12:59  --------------------------------------------------------  IN: 100 mL / OUT: 0 mL / NET: 100 mL      Physical Exam:  Gen: Pt is on supplemental oxygen via NC.   HEENT: MMM  Pulm: B/L air entry +, NVBS.   CV: S1S2+.   Abd: Soft, +BS   Ext: No LE edema B/L  Neuro: Awake  Skin: Warm and dry  Vascular access: Lt UE AVF       LABS/STUDIES  --------------------------------------------------------------------------------              14.7   29.02 >-----------<  221      [03-19-24 @ 00:17]              46.5     134  |  92  |  9   ----------------------------<  124      [03-19-24 @ 00:17]  3.1   |  21  |  1.94        Ca     10.0     [03-19-24 @ 00:17]      Mg     1.9     [03-19-24 @ 00:17]      Phos  1.7     [03-19-24 @ 00:17]    TPro  9.2  /  Alb  5.0  /  TBili  5.6  /  DBili  0.6  /  AST  27  /  ALT  15  /  AlkPhos  131  [03-19-24 @ 00:17]    PT/INR: PT 13.1 , INR 1.26       [03-19-24 @ 00:17]  PTT: 39.2       [03-19-24 @ 00:17]    CK 63      [03-18-24 @ 17:20]    Creatinine Trend:  SCr 1.94 [03-19 @ 00:17]  SCr 6.70 [03-18 @ 20:53]  SCr 6.40 [03-18 @ 17:20]  SCr 6.10 [03-18 @ 08:54]    Urinalysis - [03-19-24 @ 00:17]      Color  / Appearance  / SG  / pH       Gluc 124 / Ketone   / Bili  / Urobili        Blood  / Protein  / Leuk Est  / Nitrite       RBC  / WBC  / Hyaline  / Gran  / Sq Epi  / Non Sq Epi  / Bacteria

## 2024-03-19 NOTE — CONSULT NOTE ADULT - SUBJECTIVE AND OBJECTIVE BOX
HEMATOLOGY ONCOLOGY CONSULT     Patient is a 63y old  Female who presents with a chief complaint of SOB x 2 weeks (19 Mar 2024 06:54)    HPI:  63yoF with PMHx of myelofibrosis (dx 2021 on Ojjaara), Polycythemia Vera (Dx 2012 w/ JAK2 on hydroxyurea), splenic vein thrombosis (2015) ESRD on HD (M/W/F - via LUE AVF 2/2 chronic 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 Had a leukocytosis (WB 18.09), proBNP 51K on admission w/ CT chest showed fluid overload w/ small b/l PE with multiseptated cystic regions in the RLL. Was initially admitted for fluid overload and PNA iso ESRD and was started on IV abx, IV laxis, SLN NG. Nephro was consulted for HD. Later had a RRT called for hypertensive emergency c/f flash pulmonary edema w/ BP ~200/>100, HR 140s, tachypneic to 40s satting 95% on BiPAP. NG gtt was started by primary team 40 minutes prior. Patient later taken to the MICU for urgent HD with interveal improvement.     Heme Hx:   Follows at [facility] w/ Dr. Jacky Guo  2012: Dx w/ Polycythemia vera w/ JAK2 + splenomegaly  2015: Dx splenic vein thrombosis  2012 - 2021: On/Off hydroxyurea - more compliant since 2017 06/2021: Concern myelofibrosis progression given cytopenia. BM biopsy showing JAK2+ myeloprolifreative neoplasm w/ secondary myelofibrosis. Started on Jakafi 15mg 3x/week and later Danazol 200mg BID   2021 - 2023: Required multiple transfusions and hospitalization for symptomatic anemia. Jakafi dose adjusted based on symptoms/transfusion requirements. Now on Jakafi 10mg 3x/weekly. Danazol increased to 600mg.   12/2023 - 1/2024: Started Ojjaara (momelotinib) and discontinued Danazol. Ojjaara discontinued/restarted due to watery diarrhea. Hydrea restarted at 500mg PO TIW after HD. Restarted on Ojjaara 150mg PO qd (reduced dose) pruritis.     Heme Medications  - Hydrurea 500mg PO TIW  - Ojjaara 150mg PO QD  - Atarax 25mg PRN    Hematology was consulted for management of myelofibrosis    ROS negative except as indicated in the HPI.    PAST MEDICAL & SURGICAL HISTORY:  ESRD on dialysis  Anemia secondary to renal failure  Polycythemia  Myelofibrosis    SOCIAL HISTORY:    FAMILY HISTORY:    MEDICATIONS  (STANDING):  azithromycin  IVPB      azithromycin  IVPB 500 milliGRAM(s) IV Intermittent once  carvedilol 3.125 milliGRAM(s) Oral every 12 hours  heparin   Injectable 5000 Unit(s) SubCutaneous every 8 hours  piperacillin/tazobactam IVPB.. 3.375 Gram(s) IV Intermittent every 12 hours  ursodiol Capsule 300 milliGRAM(s) Oral two times a day    MEDICATIONS  (PRN):    Allergies  No Known Allergies  Intolerances    Vital Signs Last 24 Hrs  T(C): 36.7 (19 Mar 2024 08:00), Max: 37 (19 Mar 2024 00:00)  T(F): 98.1 (19 Mar 2024 08:00), Max: 98.6 (19 Mar 2024 00:00)  HR: 93 (19 Mar 2024 10:23) (90 - 152)  BP: 104/53 (19 Mar 2024 10:23) (104/53 - 200/128)  BP(mean): 76 (19 Mar 2024 10:00) (76 - 150)  RR: 28 (19 Mar 2024 10:23) (18 - 40)  SpO2: 94% (19 Mar 2024 10:23) (89% - 100%)    Parameters below as of 19 Mar 2024 08:00  Patient On (Oxygen Delivery Method): nasal cannula  O2 Flow (L/min): 3    PHYSICAL EXAM***  General: adult in NAD  HEENT: clear oropharynx, anicteric sclera, pink conjunctiva  Neck: supple  CV: normal S1/S2 with no murmur rubs or gallops  Lungs: positive air movement b/l ant lungs, clear to auscultation, no wheezes, no rales  Abdomen: soft non-tender non-distended, no hepatosplenomegaly  Ext: no clubbing cyanosis or edema  Skin: no rashes and no petechiae  Neuro: alert and oriented X 4, no focal deficits    03-18-24 @ 07:01  -  03-19-24 @ 07:00  --------------------------------------------------------  IN: 1180 mL / OUT: 3300 mL / NET: -2120 mL    03-19-24 @ 07:01  -  03-19-24 @ 10:38  --------------------------------------------------------  IN: 100 mL / OUT: 0 mL / NET: 100 mL    LABS:                          14.7   29.02 )-----------( 221      ( 19 Mar 2024 00:17 )             46.5     Mean Cell Volume : 94.1 fl  Mean Cell Hemoglobin : 29.8 pg  Mean Cell Hemoglobin Concentration : 31.6 gm/dL  Auto Neutrophil # : 26.70 K/uL  Auto Lymphocyte # : 0.78 K/uL  Auto Monocyte # : 1.02 K/uL  Auto Eosinophil # : 0.26 K/uL  Auto Basophil # : 0.26 K/uL  Auto Neutrophil % : 92.0 %  Auto Lymphocyte % : 2.7 %  Auto Monocyte % : 3.5 %  Auto Eosinophil % : 0.9 %  Auto Basophil % : 0.9 %      03-19    134<L>  |  92<L>  |  9   ----------------------------<  124<H>  3.1<L>   |  21<L>  |  1.94<H>    Ca    10.0      19 Mar 2024 00:17  Phos  1.7     03-19  Mg     1.9     03-19    TPro  9.2<H>  /  Alb  5.0  /  TBili  5.6<H>  /  DBili  0.6<H>  /  AST  27  /  ALT  15  /  AlkPhos  131<H>  03-19    PT/INR - ( 19 Mar 2024 00:17 )   PT: 13.1 sec;   INR: 1.26 ratio       PTT - ( 19 Mar 2024 00:17 )  PTT:39.2 sec   HEMATOLOGY ONCOLOGY CONSULT     Patient is a 63y old  Female who presents with a chief complaint of SOB x 2 weeks (19 Mar 2024 06:54)    HPI:  63yoF with PMHx of secondary myelofibrosis (dx 2021 on Ojjaara + hydroxyurea from  Polycythemia Vera, splenic vein thrombosis (2015) ESRD on HD (M/W/F - via LUE AVF 2/2 chronic 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 Had a leukocytosis (WB 18.09), proBNP 51K on admission w/ CT chest showed fluid overload w/ small b/l PE with multiseptated cystic regions in the RLL. Was initially admitted for fluid overload and PNA iso ESRD and was started on IV abx, IV laxis, SLN NG. Nephro was consulted for HD. Later had a RRT called for hypertensive emergency c/f flash pulmonary edema w/ BP ~200/>100, HR 140s, tachypneic to 40s satting 95% on BiPAP. NG gtt was started by primary team 40 minutes prior. Patient later taken to the MICU for urgent HD with interveal improvement.     Heme Hx:   Follows at [facility] w/ Dr. Jacky uGo  2012: Dx w/ Polycythemia vera w/ JAK2 + splenomegaly  2015: Dx splenic vein thrombosis  2012 - 2021: On/Off hydroxyurea - more compliant since 2017 06/2021: Concern myelofibrosis progression given cytopenia. BM biopsy showing JAK2+ myeloprolifreative neoplasm w/ secondary myelofibrosis. Started on Jakafi 15mg 3x/week and later Danazol 200mg BID   2021 - 2023: Required multiple transfusions and hospitalization for symptomatic anemia. Jakafi dose adjusted based on symptoms/transfusion requirements. Now on Jakafi 10mg 3x/weekly. Danazol increased to 600mg.   12/2023 - 1/2024: Started Ojjaara (momelotinib) and discontinued Danazol. Ojjaara discontinued/restarted due to watery diarrhea. Hydrea restarted at 500mg PO TIW after HD. Restarted on Ojjaara 150mg PO qd (reduced dose) pruritis.     Heme Medications  - Hydrurea 500mg PO TIW  - Ojjaara 150mg PO QD    Hematology was consulted for management of myelofibrosis    ROS negative except as indicated in the HPI.    PAST MEDICAL & SURGICAL HISTORY:  ESRD on dialysis  Anemia secondary to renal failure  Polycythemia  Myelofibrosis    SOCIAL HISTORY:  - Former smoker  - No current alcohol  - Retired. Previously worked as a     FAMILY HISTORY:  - Family hx of laryngeal cancer  - No fam hx of blood disorders    MEDICATIONS  (STANDING):  azithromycin  IVPB      azithromycin  IVPB 500 milliGRAM(s) IV Intermittent once  carvedilol 3.125 milliGRAM(s) Oral every 12 hours  heparin   Injectable 5000 Unit(s) SubCutaneous every 8 hours  piperacillin/tazobactam IVPB.. 3.375 Gram(s) IV Intermittent every 12 hours  ursodiol Capsule 300 milliGRAM(s) Oral two times a day    MEDICATIONS  (PRN):    Allergies  No Known Allergies  Intolerances    Vital Signs Last 24 Hrs  T(C): 36.7 (19 Mar 2024 08:00), Max: 37 (19 Mar 2024 00:00)  T(F): 98.1 (19 Mar 2024 08:00), Max: 98.6 (19 Mar 2024 00:00)  HR: 93 (19 Mar 2024 10:23) (90 - 152)  BP: 104/53 (19 Mar 2024 10:23) (104/53 - 200/128)  BP(mean): 76 (19 Mar 2024 10:00) (76 - 150)  RR: 28 (19 Mar 2024 10:23) (18 - 40)  SpO2: 94% (19 Mar 2024 10:23) (89% - 100%)    Parameters below as of 19 Mar 2024 08:00  Patient On (Oxygen Delivery Method): nasal cannula  O2 Flow (L/min): 3    PHYSICAL EXAM  General: NAD on 3L NC  HEENOT: Clear oropharynx, anicteric sclera  Neck: Supple  CV: Normal S1/S2, no murmurs/rubs/gallops  Lungs: Fine crackles LLL, overall good aeration   Abd: Splenomegaly 5cm below left rib border. Nondistended  Extremities: AVF left upper extremity  Neuro: alert and oriented X 4, no focal deficits    03-18-24 @ 07:01  -  03-19-24 @ 07:00  --------------------------------------------------------  IN: 1180 mL / OUT: 3300 mL / NET: -2120 mL    03-19-24 @ 07:01  -  03-19-24 @ 10:38  --------------------------------------------------------  IN: 100 mL / OUT: 0 mL / NET: 100 mL    LABS:                          14.7   29.02 )-----------( 221      ( 19 Mar 2024 00:17 )             46.5     Mean Cell Volume : 94.1 fl  Mean Cell Hemoglobin : 29.8 pg  Mean Cell Hemoglobin Concentration : 31.6 gm/dL  Auto Neutrophil # : 26.70 K/uL  Auto Lymphocyte # : 0.78 K/uL  Auto Monocyte # : 1.02 K/uL  Auto Eosinophil # : 0.26 K/uL  Auto Basophil # : 0.26 K/uL  Auto Neutrophil % : 92.0 %  Auto Lymphocyte % : 2.7 %  Auto Monocyte % : 3.5 %  Auto Eosinophil % : 0.9 %  Auto Basophil % : 0.9 %      03-19    134<L>  |  92<L>  |  9   ----------------------------<  124<H>  3.1<L>   |  21<L>  |  1.94<H>    Ca    10.0      19 Mar 2024 00:17  Phos  1.7     03-19  Mg     1.9     03-19    TPro  9.2<H>  /  Alb  5.0  /  TBili  5.6<H>  /  DBili  0.6<H>  /  AST  27  /  ALT  15  /  AlkPhos  131<H>  03-19    PT/INR - ( 19 Mar 2024 00:17 )   PT: 13.1 sec;   INR: 1.26 ratio       PTT - ( 19 Mar 2024 00:17 )  PTT:39.2 sec   HEMATOLOGY ONCOLOGY CONSULT     Patient is a 63y old  Female who presents with a chief complaint of SOB x 2 weeks (19 Mar 2024 06:54)    HPI:  63yoF with PMHx of secondary myelofibrosis (dx 2021 on Ojjaara + hydroxyurea from  Polycythemia Vera, splenic vein thrombosis (2015) ESRD on HD (M/W/F - via LUE AVF 2/2 chronic 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 Had a leukocytosis (WB 18.09), proBNP 51K on admission w/ CT chest showed fluid overload w/ small b/l PE with multiseptated cystic regions in the RLL. Was initially admitted for fluid overload and PNA iso ESRD and was started on IV abx, IV laxis, SLN NG. Nephro was consulted for HD. Later had a RRT called for hypertensive emergency c/f flash pulmonary edema w/ BP ~200/>100, HR 140s, tachypneic to 40s satting 95% on BiPAP. NG gtt was started by primary team 40 minutes prior. Patient later taken to the MICU for urgent HD with interval improvement.     Heme Hx:   Follows at [facility] w/ Dr. Jacky Guo  2012: Dx w/ Polycythemia vera w/ JAK2 + splenomegaly  2015: Dx splenic vein thrombosis  2012 - 2021: On/Off hydroxyurea - more compliant since 2017 06/2021: Concern myelofibrosis progression given cytopenia. BM biopsy showing JAK2+ myeloprolifreative neoplasm w/ secondary myelofibrosis. Started on Jakafi 15mg 3x/week and later Danazol 200mg BID   2021 - 2023: Required multiple transfusions and hospitalization for symptomatic anemia. Jakafi dose adjusted based on symptoms/transfusion requirements. Now on Jakafi 10mg 3x/weekly. Danazol increased to 600mg.   12/2023 - 1/2024: Started Ojjaara (momelotinib) and discontinued Danazol. Ojjaara discontinued/restarted due to watery diarrhea. Hydrea restarted at 500mg PO TIW after HD. Restarted on Ojjaara 150mg PO qd (reduced dose) pruritis.     Heme Medications  - Hydrurea 500mg PO TIW  - Ojjaara 150mg PO QD    Hematology was consulted for management of myelofibrosis    ROS negative except as indicated in the HPI.    PAST MEDICAL & SURGICAL HISTORY:  ESRD on dialysis  Anemia secondary to renal failure  Polycythemia  Myelofibrosis    SOCIAL HISTORY:  - Former smoker  - No current alcohol  - Retired. Previously worked as a     FAMILY HISTORY:  - Family hx of laryngeal cancer  - No fam hx of blood disorders    MEDICATIONS  (STANDING):  azithromycin  IVPB      azithromycin  IVPB 500 milliGRAM(s) IV Intermittent once  carvedilol 3.125 milliGRAM(s) Oral every 12 hours  heparin   Injectable 5000 Unit(s) SubCutaneous every 8 hours  piperacillin/tazobactam IVPB.. 3.375 Gram(s) IV Intermittent every 12 hours  ursodiol Capsule 300 milliGRAM(s) Oral two times a day    MEDICATIONS  (PRN):    Allergies  No Known Allergies  Intolerances    Vital Signs Last 24 Hrs  T(C): 36.7 (19 Mar 2024 08:00), Max: 37 (19 Mar 2024 00:00)  T(F): 98.1 (19 Mar 2024 08:00), Max: 98.6 (19 Mar 2024 00:00)  HR: 93 (19 Mar 2024 10:23) (90 - 152)  BP: 104/53 (19 Mar 2024 10:23) (104/53 - 200/128)  BP(mean): 76 (19 Mar 2024 10:00) (76 - 150)  RR: 28 (19 Mar 2024 10:23) (18 - 40)  SpO2: 94% (19 Mar 2024 10:23) (89% - 100%)    Parameters below as of 19 Mar 2024 08:00  Patient On (Oxygen Delivery Method): nasal cannula  O2 Flow (L/min): 3    PHYSICAL EXAM  General: NAD on 3L NC  HEENOT: Clear oropharynx, anicteric sclera  Neck: Supple  CV: Normal S1/S2, no murmurs/rubs/gallops  Lungs: Fine crackles LLL, overall good aeration   Abd: Splenomegaly 5cm below left rib border. Nondistended  Extremities: AVF left upper extremity  Neuro: alert and oriented X 4, no focal deficits    03-18-24 @ 07:01  -  03-19-24 @ 07:00  --------------------------------------------------------  IN: 1180 mL / OUT: 3300 mL / NET: -2120 mL    03-19-24 @ 07:01  -  03-19-24 @ 10:38  --------------------------------------------------------  IN: 100 mL / OUT: 0 mL / NET: 100 mL    LABS:                          14.7   29.02 )-----------( 221      ( 19 Mar 2024 00:17 )             46.5     Mean Cell Volume : 94.1 fl  Mean Cell Hemoglobin : 29.8 pg  Mean Cell Hemoglobin Concentration : 31.6 gm/dL  Auto Neutrophil # : 26.70 K/uL  Auto Lymphocyte # : 0.78 K/uL  Auto Monocyte # : 1.02 K/uL  Auto Eosinophil # : 0.26 K/uL  Auto Basophil # : 0.26 K/uL  Auto Neutrophil % : 92.0 %  Auto Lymphocyte % : 2.7 %  Auto Monocyte % : 3.5 %  Auto Eosinophil % : 0.9 %  Auto Basophil % : 0.9 %      03-19    134<L>  |  92<L>  |  9   ----------------------------<  124<H>  3.1<L>   |  21<L>  |  1.94<H>    Ca    10.0      19 Mar 2024 00:17  Phos  1.7     03-19  Mg     1.9     03-19    TPro  9.2<H>  /  Alb  5.0  /  TBili  5.6<H>  /  DBili  0.6<H>  /  AST  27  /  ALT  15  /  AlkPhos  131<H>  03-19    PT/INR - ( 19 Mar 2024 00:17 )   PT: 13.1 sec;   INR: 1.26 ratio       PTT - ( 19 Mar 2024 00:17 )  PTT:39.2 sec

## 2024-03-19 NOTE — CHART NOTE - NSCHARTNOTEFT_GEN_A_CORE
: Deisy Martinez    INDICATION: critical illness    PROCEDURE:  [x ] LIMITED ECHO  [x ] LIMITED CHEST  [ ] LIMITED RETROPERITONEAL  [ ] LIMITED ABDOMINAL  [ ] LIMITED DVT  [ ] NEEDLE GUIDANCE VASCULAR  [ ] NEEDLE GUIDANCE THORACENTESIS  [ ] NEEDLE GUIDANCE PARACENTESIS  [ ] NEEDLE GUIDANCE PERICARDIOCENTESIS  [ ] OTHER    FINDINGS: A lines predominate anteriorly. Small left pleural effusion with one septation, otherwise simple. Trace right pleural effusion with small adjacent consolidation pattern that does not appear dynamic. Normal LV systolic function with VTI 18; RV less than LV. RV systolic function grossly normal. IVC in the indeterminate zone.       INTERPRETATION: No cardiac limitations. Small simple appearing pleural effusions b/l.     Images stored in [facility]

## 2024-03-19 NOTE — CHART NOTE - NSCHARTNOTEFT_GEN_A_CORE
MICU Transfer Note    Transfer from: MICU    Transfer to: (  ) Medicine    (  ) Telemetry     (   ) RCU        (    ) Palliative         (   ) Stroke Unit          (   ) __________________    Accepting Physician:  Signout given to:     MICU COURSE:        Patient is hemodynamically stable and ready for transfer out of the ICU.    ASSESSMENT & PLAN:             FOR FOLLOW UP: MICU Transfer Note    Transfer from: MICU    Transfer to: (X) Medicine    (  ) Telemetry     (   ) RCU        (    ) Palliative         (   ) Stroke Unit          (   ) __________________    Accepting Physician:  Signout given to:     MICU COURSE:    Mihyeon Hu is a 63 y.o. 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 SOB. Patient endorses progressively worsening SOB over the last 2 weeks. Per pt, after urinating this AM she subsequently developed acute onset of SOB. She does also c.o. a headache, but denies F/C, coughing, vomiting however, endorsing nausea, otherwise, denies CP, abd pain, sick contacts. Of note, last HD Friday 03/15/24. Pt denies changes in diet or increased intake of salty foods.     In the ED, pt negative for Covid, flu, RSV. Labs significant for WBC: 18.09, initial bicarb of 19, redraw of 17, initial trop of 26, repeat of 35, anion gap of 17, procal of 0.76, [place] [address]. CT Head negative for acute infarction or hemorrhage, CT chest notable for fluid overload status with small b/l pleural effusions. CXR demonstrates increased interstitial markings and peribronchial cuffing consistent with pulmonary edema. Started on Ceftriaxone and azithromycin for PNA. Pt admitted to MICU for acute hypoxemic respiratory failure likely 2/2 to fluid overload status, possibly PNA.     Pt had 3.3L removed via urgent HD in MICU w/ improvement in SOB. Pt was discontinued off of zosyn 3/18 and started on CTX. HD scheduled for 3/20/24. GI was consulted as pt was pending outpt EGD for varices and colonoscopy for diarrhea. GI recommended calling back once pt is stable on floors.     Patient is hemodynamically stable and ready for transfer out of the ICU.    ASSESSMENT & PLAN:     ASSESSMENT  Mihyeon Hu is a 63 y.o. 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 SOB, progressively worsening SOB over the last 2 weeks. In the ED, found to be fluid overloaded on CT chest, with small b/l plural effusions, WBC: 18.09, [place] [address]. Admitted to MICU for acute hypoxemic respiratory failure likely 2/2 to fluid overload status, possibly PNA.        PLAN  =====Neurologic=====  Patient is AOx4  CHERRY    =====Pulmonary=====  #Acute hypoxemic respiratory failure  CT chest: fluid overload status with small b/l pleural effusions  CXR: increased interstitial markings and peribronchial cuffing consistent with pulmonary edema  -S/p BiPAP 15/8 at 50% FiO2, wean as tolerated s/p HD  -hemodialysis   - infectious work up and abx as stated below     =====Cardiovascular=====  #Chest pain  #Hypertensive emergency   S/p Nitro drip    - r/o acute cardiac pathology  - repeat EKG, trend trop, ECHO    =====GI=====  #Nausea and diarrhea due to myelofibrosis meds, acute on chronic  #Nodular regenerative hyperplasia w/ gastric varices  - GI recommends f/u for EGD/colonoscopy after pt more stable and leaves MICU    -antiemetic PRN   -continue PPI      #Diet  - DASH diet     =====Renal/=====  #Respiratory alkalosis and metabolic acidosis  #ESRD on HD M/W/F  - hemodialysis for volume removal 3/17  - last outpt HD session 3/15/24  - usually gets 1.5L removed outpt  - Pending another HD session 3/20      =====Endocrine=====  CHERRY  -check A1C in AM    =====Infectious Disease=====  #PNA   - c/w Azithromycin (3/18 -   - Zosyn (3/18 - 3/19), CTX (3/19 -   - RVP neg for flu/COVID, MRSA neg  - Urine legionella, urine strep pneumo    - f/u BCx, UCx, Sputum Cx    =====Heme/Onc=====  #Polycythemia vera  w/ secondary myelofibrosis  -c/w at home Hydroxyurea 500mg TID  -c/w at home Ojjaara 150mg qD  - f/u heme recs    #DVT Ppx  - Heparin SC Q8H    =====Ethics=====  FULL CODE  Surrogate decision maker: Daughter.        FOR FOLLOW UP:  [ ] F/u GI PCR, stool culture, sputum culture  [ ] Re-consult GI for EGD and colonoscopy  [ ] F/u Heme recs  [ ] F/u TTE  [ ] F/u nephro recs MICU Transfer Note    Transfer from: MICU    Transfer to: (X) Medicine    (  ) Telemetry     (   ) RCU        (    ) Palliative         (   ) Stroke Unit          (   ) __________________    Accepting Physician: Dr. Rodriguez  Signout given to: Dr. Rodriguez    MICU COURSE:    Mihyeon Hu is a 63 y.o. 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 SOB. Patient endorses progressively worsening SOB over the last 2 weeks. Per pt, after urinating this AM she subsequently developed acute onset of SOB. She does also c.o. a headache, but denies F/C, coughing, vomiting however, endorsing nausea, otherwise, denies CP, abd pain, sick contacts. Of note, last HD Friday 03/15/24. Pt denies changes in diet or increased intake of salty foods.     In the ED, pt negative for Covid, flu, RSV. Labs significant for WBC: 18.09, initial bicarb of 19, redraw of 17, initial trop of 26, repeat of 35, anion gap of 17, procal of 0.76, [place] of 44253. CT Head negative for acute infarction or hemorrhage, CT chest notable for fluid overload status with small b/l pleural effusions. CXR demonstrates increased interstitial markings and peribronchial cuffing consistent with pulmonary edema. Started on Ceftriaxone and azithromycin for PNA. Pt admitted to MICU for acute hypoxemic respiratory failure likely 2/2 to fluid overload status, possibly PNA.     Pt had 3.3L removed via urgent HD in MICU w/ improvement in SOB. Pt was discontinued off of zosyn 3/18 and started on CTX. HD scheduled for 3/20/24. GI was consulted as pt was pending outpt EGD for varices and colonoscopy for diarrhea. GI recommended calling back once pt is stable on floors.     Patient is hemodynamically stable and ready for transfer out of the ICU.    ASSESSMENT & PLAN:     ASSESSMENT  Mihyeon Hu is a 63 y.o. 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 SOB, progressively worsening SOB over the last 2 weeks. In the ED, found to be fluid overloaded on CT chest, with small b/l plural effusions, WBC: 18.09, [place] of [address]. Admitted to MICU for acute hypoxemic respiratory failure likely 2/2 to fluid overload status, possibly PNA.        PLAN  =====Neurologic=====  Patient is AOx4  CHERRY    =====Pulmonary=====  #Acute hypoxemic respiratory failure  CT chest: fluid overload status with small b/l pleural effusions  CXR: increased interstitial markings and peribronchial cuffing consistent with pulmonary edema  -S/p BiPAP 15/8 at 50% FiO2, wean as tolerated s/p HD  -hemodialysis   - infectious work up and abx as stated below     =====Cardiovascular=====  #Chest pain  #Hypertensive emergency   S/p Nitro drip    - r/o acute cardiac pathology  - repeat EKG, trend trop, ECHO    =====GI=====  #Nausea and diarrhea due to myelofibrosis meds, acute on chronic  #Nodular regenerative hyperplasia w/ gastric varices  - GI recommends f/u for EGD/colonoscopy after pt more stable and leaves MICU    -antiemetic PRN   -continue PPI      #Diet  - DASH diet     =====Renal/=====  #Respiratory alkalosis and metabolic acidosis  #ESRD on HD M/W/F  - hemodialysis for volume removal 3/17  - last outpt HD session 3/15/24  - usually gets 1.5L removed outpt  - Pending another HD session 3/20      =====Endocrine=====  CHERRY  -check A1C in AM    =====Infectious Disease=====  #PNA   - c/w Azithromycin (3/18 -   - Zosyn (3/18 - 3/19), CTX (3/19 -   - RVP neg for flu/COVID, MRSA neg  - Urine legionella, urine strep pneumo    - f/u BCx, UCx, Sputum Cx    =====Heme/Onc=====  #Polycythemia vera  w/ secondary myelofibrosis  -c/w at home Hydroxyurea 500mg TID  -c/w at home Ojjaara 150mg qD  - f/u heme recs    #DVT Ppx  - Heparin SC Q8H    =====Ethics=====  FULL CODE  Surrogate decision maker: Daughter.        FOR FOLLOW UP:  [ ] F/u GI PCR, stool culture, sputum culture  [ ] Re-consult GI for EGD and colonoscopy  [ ] F/u Heme recs  [ ] F/u TTE  [ ] F/u nephro recs MICU Transfer Note    Transfer from: MICU    Transfer to: (X) Medicine    (  ) Telemetry     (   ) RCU        (    ) Palliative         (   ) Stroke Unit          (   ) __________________    Accepting Physician: Dr. Rodriguez  Signout given to: Dr. Rodriguez, MAR, Floor ACP    MICU COURSE:    Mihyeon Hu is a 63 y.o. 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 SOB. Patient endorses progressively worsening SOB over the last 2 weeks. Per pt, after urinating this AM she subsequently developed acute onset of SOB. She does also c.o. a headache, but denies F/C, coughing, vomiting however, endorsing nausea, otherwise, denies CP, abd pain, sick contacts. Of note, last HD Friday 03/15/24. Pt denies changes in diet or increased intake of salty foods.     In the ED, pt negative for Covid, flu, RSV. Labs significant for WBC: 18.09, initial bicarb of 19, redraw of 17, initial trop of 26, repeat of 35, anion gap of 17, procal of 0.76, [place]NP of 91729. CT Head negative for acute infarction or hemorrhage, CT chest notable for fluid overload status with small b/l pleural effusions. CXR demonstrates increased interstitial markings and peribronchial cuffing consistent with pulmonary edema. Started on Ceftriaxone and azithromycin for PNA. Pt admitted to MICU for acute hypoxemic respiratory failure likely 2/2 to fluid overload status, possibly PNA.     Pt had 3.3L removed via urgent HD in MICU w/ improvement in SOB. Pt was discontinued off of zosyn 3/18 and started on CTX. HD scheduled for 3/20/24. GI was consulted as pt was pending outpt EGD for varices and colonoscopy for diarrhea. GI recommended calling back once pt is stable on floors.     Patient is hemodynamically stable and ready for transfer out of the ICU.    ASSESSMENT & PLAN:     ASSESSMENT  Mihyeon Hu is a 63 y.o. 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 SOB, progressively worsening SOB over the last 2 weeks. In the ED, found to be fluid overloaded on CT chest, with small b/l plural effusions, WBC: 18.09, [place] [address]. Admitted to MICU for acute hypoxemic respiratory failure likely 2/2 to fluid overload status, possibly PNA.        PLAN  =====Neurologic=====  Patient is AOx4  CHERRY    =====Pulmonary=====  #Acute hypoxemic respiratory failure  CT chest: fluid overload status with small b/l pleural effusions  CXR: increased interstitial markings and peribronchial cuffing consistent with pulmonary edema  -S/p BiPAP 15/8 at 50% FiO2, wean as tolerated s/p HD  -hemodialysis   - infectious work up and abx as stated below     =====Cardiovascular=====  #Chest pain  #Hypertensive emergency   S/p Nitro drip    - r/o acute cardiac pathology  - repeat EKG, trend trop, ECHO    =====GI=====  #Nausea and diarrhea due to myelofibrosis meds, acute on chronic  #Nodular regenerative hyperplasia w/ gastric varices  - GI recommends f/u for EGD/colonoscopy after pt more stable and leaves MICU    -antiemetic PRN   -continue PPI      #Diet  - DASH diet     =====Renal/=====  #Respiratory alkalosis and metabolic acidosis  #ESRD on HD M/W/F  - hemodialysis for volume removal 3/17  - last outpt HD session 3/15/24  - usually gets 1.5L removed outpt  - Pending another HD session 3/20      =====Endocrine=====  CHERRY  -check A1C in AM    =====Infectious Disease=====  #PNA   - c/w Azithromycin (3/18 -   - Zosyn (3/18 - 3/19), CTX (3/19 -   - RVP neg for flu/COVID, MRSA neg  - Urine legionella, urine strep pneumo    - f/u BCx, UCx, Sputum Cx    =====Heme/Onc=====  #Polycythemia vera  w/ secondary myelofibrosis  -c/w at home Hydroxyurea 500mg TID  -c/w at home Ojjaara 150mg qD  - f/u heme recs    #DVT Ppx  - Heparin SC Q8H    =====Ethics=====  FULL CODE  Surrogate decision maker: Daughter.        FOR FOLLOW UP:  [ ] F/u GI PCR, stool culture, sputum culture  [ ] Re-consult GI for EGD and colonoscopy  [ ] F/u Heme recs  [ ] F/u TTE  [ ] F/u nephro recs MICU Transfer Note    Transfer from: MICU    Transfer to: (X) Medicine    (  ) Telemetry     (   ) RCU        (    ) Palliative         (   ) Stroke Unit          (   ) __________________    Accepting Physician: Dr. Rodriguez  Signout given to: Dr. Rodriguez, MAR, Floor ACP    MICU COURSE:    Mihyeon Hu is a 63 y.o. 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 SOB. Patient endorses progressively worsening SOB over the last 2 weeks. Per pt, after urinating this AM she subsequently developed acute onset of SOB. She does also c.o. a headache, but denies F/C, coughing, vomiting however, endorsing nausea, otherwise, denies CP, abd pain, sick contacts. Of note, last HD Friday 03/15/24. Pt denies changes in diet or increased intake of salty foods.     In the ED, pt negative for Covid, flu, RSV. Labs significant for WBC: 18.09, initial bicarb of 19, redraw of 17, initial trop of 26, repeat of 35, anion gap of 17, procal of 0.76, [place] of 93129. CT Head negative for acute infarction or hemorrhage, CT chest notable for fluid overload status with small b/l pleural effusions. CXR demonstrates increased interstitial markings and peribronchial cuffing consistent with pulmonary edema. Started on Ceftriaxone and azithromycin for PNA. Pt admitted to MICU for acute hypoxemic respiratory failure likely 2/2 to fluid overload status, possibly PNA.     Pt had 3.3L removed via urgent HD in MICU w/ improvement in SOB. Pt was discontinued off of zosyn 3/18 and started on CTX. HD scheduled for 3/20/24. GI was consulted as pt was pending outpt EGD for varices and colonoscopy for diarrhea. GI recommended calling back once pt is stable on floors.     Patient is hemodynamically stable and ready for transfer out of the ICU.    ASSESSMENT & PLAN:     ASSESSMENT  Mihyeon Hu is a 63 y.o. 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 SOB, progressively worsening SOB over the last 2 weeks. In the ED, found to be fluid overloaded on CT chest, with small b/l plural effusions, WBC: 18.09, [place]NP of 435[address]. Admitted to MICU for acute hypoxemic respiratory failure likely 2/2 to fluid overload status, possibly PNA.        PLAN  =====Neurologic=====  Patient is AOx4  CHERRY    =====Pulmonary=====  #Acute hypoxemic respiratory failure  CT chest: fluid overload status with small b/l pleural effusions  CXR: increased interstitial markings and peribronchial cuffing consistent with pulmonary edema  -S/p BiPAP 15/8 at 50% FiO2, wean as tolerated s/p HD  -hemodialysis   - infectious work up and abx as stated below     =====Cardiovascular=====  #Chest pain  #Hypertensive emergency   S/p Nitro drip    - r/o acute cardiac pathology  - repeat EKG, trend trop, ECHO    =====GI=====  #Nausea and diarrhea due to myelofibrosis meds, acute on chronic  #Nodular regenerative hyperplasia w/ gastric varices  - GI recommends f/u for EGD/colonoscopy after pt more stable and leaves MICU    -antiemetic PRN   -continue PPI      #Diet  - DASH diet     =====Renal/=====  #Respiratory alkalosis and metabolic acidosis  #ESRD on HD M/W/F  - hemodialysis for volume removal 3/17  - last outpt HD session 3/15/24  - usually gets 1.5L removed outpt  - Pending another HD session 3/20      =====Endocrine=====  CHERRY  -check A1C in AM    =====Infectious Disease=====  #PNA   - c/w Azithromycin (3/18 -   - Zosyn (3/18 - 3/19), CTX (3/19 -   - RVP neg for flu/COVID, MRSA neg  - Urine legionella, urine strep pneumo    - f/u BCx, UCx, Sputum Cx    =====Heme/Onc=====  #Polycythemia vera  w/ secondary myelofibrosis  -Hold home Hydroxyurea 500mg TID and ojjaara while in pt per heme recs  - Trend CBC daily per heme  - f/u heme recs    #DVT Ppx  - Heparin SC Q8H    =====Ethics=====  FULL CODE  Surrogate decision maker: Daughter.        FOR FOLLOW UP:  [ ] F/u GI PCR, stool culture, sputum culture  [ ] Re-consult GI for EGD and colonoscopy  [ ] F/u Heme recs  [ ] F/u TTE  [ ] F/u nephro recs

## 2024-03-19 NOTE — CONSULT NOTE ADULT - ASSESSMENT
63yoF PHx of myelofibrosis on Ojjaara who follows with Dr. Jacky Guo at [facility], PV (JAK2+) on hydroxyurea, ESRD on HD (MWF) via JESÚS DAIGLE (2015) presented for fluid overload w/ concomitant PNA likely 2/2 ESRD, with RRT called for concern of flash pulmonary edema requiring urgent HD. Heme was consulted for the management of myelofibrosis.     #Polycythemia vera w/ secondary myelofibrosis  #ESRD on HD   #Splenomegaly  PV MAK+ dz 2012 with progression to myelofibrosis initially on Jakafi and Ranazol, now on Ojjaara and hydroxyurea.  WBC 29.02, Hgb 14.7 (Improved from ~7.3 on last admission), Plt: 221 (Improved from ~90k)  PT/INR/PTT: 13.1/1.26/39.2 - Rising PT/INR. PTT jump since last admission 28.4 --> 39  Side     Recommendations  - Pending discussion with attending 63yoF PHx of secondary myelofibrosis to PV (JAK2+) on Ojjaara/hydroxyurea who follows with Dr. Jacky Guo at [facility], ESRD on HD (MW) via JESÚS DAIGLE (2015) presented for fluid overload w/ concomitant PNA likely 2/2 ESRD, with RRT called for concern of flash pulmonary edema requiring urgent HD. Heme was consulted for the management of myelofibrosis.     #Polycythemia vera w/ secondary myelofibrosis  #ESRD on HD   #Splenomegaly  PV MAK+ dz 2012 with progression to myelofibrosis initially on Jakafi and Ranazol, now on Ojjaara and hydroxyurea.  WBC 29.02, Hgb 14.7 (Improved from ~7.3 on last admission), Plt: 221 (Improved from ~90k)  PT/INR/PTT: 13.1/1.26/39.2 - Rising PT/INR. PTT jump since last admission 28.4 --> 39  Reported side effects of Ojjaara have included increased risk of infections and edema which may have contributed to presentation of PNA and fluid overload.     Recommendations  - Pending discussion with attending    Neymar Hair, PGY-1 MD 63yoF PHx of secondary myelofibrosis to PV (JAK2+) on Ojjaara/hydroxyurea who follows with Dr. Jacky Guo at [facility], ESRD on HD (MW) via JESÚS DAIGLE (2015) presented for fluid overload w/ concomitant PNA likely 2/2 ESRD, with RRT called for concern of flash pulmonary edema requiring urgent HD. Heme was consulted for the management of myelofibrosis.     #Polycythemia vera w/ secondary myelofibrosis  #ESRD on HD   #Splenomegaly  PV MAK+ dz 2012 with progression to myelofibrosis initially on Jakafi and Ranazol, now on Ojjaara and hydroxyurea.  WBC 29.02, Hgb 14.7 (Improved from ~7.3 on last admission), Plt: 221 (Improved from ~90k)  PT/INR/PTT: 13.1/1.26/39.2 - Rising PT/INR. PTT jump since last admission 28.4 --> 39  Reported side effects of Ojjaara have included increased risk of infections and edema which may have contributed to presentation of PNA and fluid overload. Ojjaara has also been reported with increased risk of cardiovascular events - would hold inpatient.    Recommendations  - Hold Ojjaara while inpatient and resume on discharge.   - Hold hydroxyurea - blood counts have been stable  - Daily CBC    Hematology to Follow    Neymar Hair, PGY-1 MD 63yoF PHx of secondary myelofibrosis to PV (JAK2+) on Ojjaara/hydroxyurea who follows with Dr. Jacky Guo at [facility], ESRD on HD (MW) via JESÚS DAIGLE (2015) presented for fluid overload w/ concomitant PNA likely 2/2 ESRD, with RRT called for concern of flash pulmonary edema requiring urgent HD. Heme was consulted for the management of myelofibrosis.     #Polycythemia vera w/ secondary myelofibrosis  #ESRD on HD   #Splenomegaly  PV MAK+ dz 2012 with progression to myelofibrosis initially on Jakafi and Danazol, now on Ojjaara and hydroxyurea.  WBC 29.02, Hgb 14.7 (Improved from ~7.3 on last admission), Plt: 221 (Improved from ~90k)  PT/INR/PTT: 13.1/1.26/39.2 - Rising PT/INR. PTT jump since last admission 28.4 --> 39  Reported side effects of Ojjaara have included increased risk of infections and edema which may have contributed to presentation of PNA and fluid overload. Ojjaara has also been reported with increased risk of cardiovascular events - would hold inpatient.    Recommendations  - Hold Ojjaara while inpatient and resume on discharge.   - Hold hydroxyurea - blood counts have been stable  - Daily CBC    Hematology to Follow    Neymar Hair, PGY-1 MD

## 2024-03-19 NOTE — PROGRESS NOTE ADULT - ASSESSMENT
Mihyeon Hu is a 63 y.o. 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 SOB, progressively worsening SOB over the last 2 weeks. In the ED, found to be fluid overloaded on CT chest, with small b/l plural effusions, WBC: 18.09, proBNP of [address]. Admitted to MICU for acute hypoxemic respiratory failure likely 2/2 to fluid overload status, possibly PNA.        PLAN  =====Neurologic=====  Patient is AOx4  CHERRY    =====Pulmonary=====  #Acute hypoxemic respiratory failure  CT chest: fluid overload status with small b/l pleural effusions  CXR: increased interstitial markings and peribronchial cuffing consistent with pulmonary edema  -on BiPAP 15/8 at 50% FiO2, wean as tolerated s/p HD  -hemodialysis   - infectious work up and abx as stated below     =====Cardiovascular=====  #Chest pain  #Hypertensive emergency   on Nitro drip currently   - titrate Nitro drip to   - r/o acute cardiac pathology  - repeat EKG, trend trop, ECHO    =====GI=====  #Nausea and diarrhea due to myelofibrosis meds, acute on chronic  #Nodular regenerative hyperplasia w/ gastric varices    -antiemetic PRN   -continue PPI      #Diet  - DASH diet once off BiPAP, as she tolerates    =====Renal/=====  #ESRD on HD M/W/F  - hemodialysis for volume removal    =====Endocrine=====  CHERRY  -check A1C in AM    =====Infectious Disease=====  #PNA   - c/w Azithromycin (3/18 -   - Zosyn (3/18 -  - Urine legionella, urine strep pneumo, RVP, MRSA PCR    - f/u BCx, UCx, Sputum Cx    =====Heme/Onc=====  #Polycythemia vera  w/ secondary myelofibrosis  -c/w at home Hydroxyurea 500mg TID  -c/w at home Ojjaara 150mg qD    #DVT Ppx  - Lovenox 40mg daily     =====Ethics=====  FULL CODE  Surrogate decision maker: Daughter.   Mihyeon Hu is a 63 y.o. 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 SOB, progressively worsening SOB over the last 2 weeks. In the ED, found to be fluid overloaded on CT chest, with small b/l plural effusions, WBC: 18.09, [place] of 210[address]. Admitted to MICU for acute hypoxemic respiratory failure likely 2/2 to fluid overload status, possibly PNA.        PLAN  =====Neurologic=====  Patient is AOx4  CHERRY    =====Pulmonary=====  #Acute hypoxemic respiratory failure  CT chest: fluid overload status with small b/l pleural effusions  CXR: increased interstitial markings and peribronchial cuffing consistent with pulmonary edema  -on BiPAP 15/8 at 50% FiO2, wean as tolerated s/p HD  -hemodialysis   - infectious work up and abx as stated below     =====Cardiovascular=====  #Chest pain  #Hypertensive emergency   Elevated proBNP of 78752, trops 35 on admission, increased to 104  - s/p nitro drip   - c/w carvedilol 3.125mg q12  - r/o acute cardiac pathology  - repeat EKG, trend trop  - f/u echocardiogram    =====GI=====  #Nausea and diarrhea due to myelofibrosis meds, acute on chronic  #Nodular regenerative hyperplasia w/ gastric varices  -antiemetic PRN   -continue PPI      #Diet  - DASH diet once off BiPAP, as she tolerates    =====Renal/=====  #ESRD on HD M/W/F  last HD on 3/15; emergent HD on 3/18  - hemodialysis for volume removal    =====Endocrine=====  CHERRY  -check A1C in AM    =====Infectious Disease=====  #Possible superimposed PNA   s/p CTX 1g in ED  - c/w Azithromycin (3/18 -   - Zosyn (3/18 -  - Urine legionella, urine strep pneumo, RVP, MRSA PCR    - f/u BCx, UCx, Sputum Cx    =====Heme/Onc=====  #Polycythemia vera  w/ secondary myelofibrosis  - holding home Hydroxyurea 500mg TID, Ojjaara 150mg qD  - f/u heme/onc recs    #DVT Ppx  - c/w heparin 5000U q8     =====Ethics=====  FULL CODE  Surrogate decision maker: Daughter.   Mihyeon Hu is a 63 y.o. 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 SOB, progressively worsening SOB over the last 2 weeks. In the ED, found to be fluid overloaded on CT chest, with small b/l plural effusions, WBC: 18.09, [place]NP of 46[address]. Admitted to MICU for acute hypoxemic respiratory failure likely 2/2 to fluid overload status, possibly PNA, and improved with emergent HD on 3/18.      PLAN  =====Neurologic=====  Patient is AOx4  CHERRY    =====Pulmonary=====  #Acute hypoxemic respiratory failure  CT chest: fluid overload status with small b/l pleural effusions  CXR: increased interstitial markings and peribronchial cuffing consistent with pulmonary edema  - s/p BiPAP 15/8 at 50% FiO2; currently 4L NC, wean as tolerated   - hemodialysis   - infectious work up and abx as stated below     =====Cardiovascular=====  #Chest pain  #Hypertensive emergency   #r/o CHF  Elevated proBNP of 68062, trops 35 on admission, increased to 104  - s/p nitro drip   - c/w carvedilol 3.125mg q12  - repeat EKG, trend trop  - r/o acute cardiac pathology, f/u echocardiogram    =====GI=====  #Nausea and diarrhea due to myelofibrosis meds, acute on chronic  #Nodular regenerative hyperplasia w/ gastric varices  -antiemetic PRN   -continue home ursodiol  -continue PPI    #Diet  - DASH diet      =====Renal/=====  #ESRD on HD M/W/F  last HD on 3/15; emergent HD on 3/18  - hemodialysis for volume removal    =====Endocrine=====  CHERRY  -check A1C in AM    =====Infectious Disease=====  #Possible superimposed PNA   s/p CTX 1g in ED  - c/w Azithromycin (3/18 -   - Zosyn (3/18 -  - Urine legionella, urine strep pneumo, RVP, MRSA PCR    - f/u BCx, UCx, Sputum Cx    =====Heme/Onc=====  #Polycythemia vera  w/ secondary myelofibrosis  - holding home Hydroxyurea 500mg TID, Ojjaara 150mg qD  - f/u heme/onc recs    #DVT Ppx  - c/w heparin 5000U q8     =====Ethics=====  FULL CODE  Surrogate decision maker: Daughter.   Mihyeon Hu is a 63 y.o. 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 SOB, progressively worsening SOB over the last 2 weeks. In the ED, found to be fluid overloaded on CXR/CT chest, with small b/l plural effusions, WBC: 18.09, [place]NP of 463[address]. Admitted to MICU for acute hypoxemic respiratory failure likely 2/2 to fluid overload status, possibly PNA, and improved with emergent HD on 3/18. Now saturating well on 4LNC, stable for stepdown to floors.      PLAN  =====Neurologic=====  Patient is AOx4  CHERRY    =====Pulmonary=====  #Acute hypoxemic respiratory failure  CT chest: fluid overload status with small b/l pleural effusions  CXR: increased interstitial markings and peribronchial cuffing consistent with pulmonary edema  Favor fluid overload 2/2 chronic under-dialyzed (1.3-1.5L per session), increased fluid intake  Clinically less suspicious for PNA, suspect cough with phelgm may be 2/2 pulmonary edema  - s/p BiPAP 15/8 at 50% FiO2; currently 4L NC, wean as tolerated   - hemodialysis 3/18, planned for 3/20  - infectious work up and abx as stated below     =====Cardiovascular=====  #Chest pain  #Hypertensive emergency   #r/o CHF  Elevated proBNP of 80409, trops 35 on admission, increased to 104  Has remained normotensive  - s/p nitro drip   - c/w home med carvedilol 3.125mg q12  - repeat EKG, trend trop  - f/u TTE    =====GI=====  #Diarrhea and nausea x2 months, unclear if associated with myelofibrosis meds  #Nodular regenerative hyperplasia w/ gastric varices  - continue home ursodiol  - f/u GI for EGD/colonoscopy and recs when stepped down to [facility]  - f/u Stool Cx and PCR     #Diet  - DASH diet      =====Renal/=====  #ESRD on HD M/W/F  No missed sessions, last HD on 3/15, emergent HD on 3/18  - c/w MWF hemodialysis for volume removal    =====Endocrine=====  CHERRY  - f/u A1C    =====Infectious Disease=====  #Possible superimposed PNA   s/p CTX 1g in ED  UCx, RVP, MRSA PCR negative  - c/w Azithromycin (3/18-   - d/c Zosyn (3/18 - 3/19), switch to CTX 1g q24 (3/19-  - Urine legionella, urine strep pneumo, RVP, MRSA PCR    - f/u BCx, Sputum Cx    #Possible infectious etiology of diarrhea  - f/u Stool Cx, PCR    =====Heme/Onc=====  #Polycythemia vera  w/ secondary myelofibrosis  - holding home Hydroxyurea 500mg TID, Ojjaara 150mg qD  - f/u heme/onc recs    #DVT Ppx  - c/w heparin 5000U q8     =====Ethics=====  FULL CODE  Surrogate decision maker: Daughters.

## 2024-03-20 ENCOUNTER — RESULT REVIEW (OUTPATIENT)
Age: 64
End: 2024-03-20

## 2024-03-20 LAB
ANION GAP SERPL CALC-SCNC: 21 MMOL/L — HIGH (ref 5–17)
BUN SERPL-MCNC: 43 MG/DL — HIGH (ref 7–23)
CALCIUM SERPL-MCNC: 9.3 MG/DL — SIGNIFICANT CHANGE UP (ref 8.4–10.5)
CHLORIDE SERPL-SCNC: 96 MMOL/L — SIGNIFICANT CHANGE UP (ref 96–108)
CO2 SERPL-SCNC: 22 MMOL/L — SIGNIFICANT CHANGE UP (ref 22–31)
CREAT SERPL-MCNC: 6.45 MG/DL — HIGH (ref 0.5–1.3)
EGFR: 7 ML/MIN/1.73M2 — LOW
GLUCOSE SERPL-MCNC: 72 MG/DL — SIGNIFICANT CHANGE UP (ref 70–99)
GRAM STN FLD: ABNORMAL
HCT VFR BLD CALC: 36.4 % — SIGNIFICANT CHANGE UP (ref 34.5–45)
HGB BLD-MCNC: 10.8 G/DL — LOW (ref 11.5–15.5)
LEGIONELLA AG UR QL: NEGATIVE — SIGNIFICANT CHANGE UP
MAGNESIUM SERPL-MCNC: 2.3 MG/DL — SIGNIFICANT CHANGE UP (ref 1.6–2.6)
MCHC RBC-ENTMCNC: 29.7 GM/DL — LOW (ref 32–36)
MCHC RBC-ENTMCNC: 29.7 PG — SIGNIFICANT CHANGE UP (ref 27–34)
MCV RBC AUTO: 100 FL — SIGNIFICANT CHANGE UP (ref 80–100)
NRBC # BLD: 0 /100 WBCS — SIGNIFICANT CHANGE UP (ref 0–0)
PHOSPHATE SERPL-MCNC: 5 MG/DL — HIGH (ref 2.5–4.5)
PLATELET # BLD AUTO: 110 K/UL — LOW (ref 150–400)
POTASSIUM SERPL-MCNC: 4.6 MMOL/L — SIGNIFICANT CHANGE UP (ref 3.5–5.3)
POTASSIUM SERPL-SCNC: 4.6 MMOL/L — SIGNIFICANT CHANGE UP (ref 3.5–5.3)
RBC # BLD: 3.64 M/UL — LOW (ref 3.8–5.2)
RBC # FLD: 20.9 % — HIGH (ref 10.3–14.5)
S PNEUM AG UR QL: NEGATIVE — SIGNIFICANT CHANGE UP
SODIUM SERPL-SCNC: 139 MMOL/L — SIGNIFICANT CHANGE UP (ref 135–145)
SPECIMEN SOURCE: SIGNIFICANT CHANGE UP
WBC # BLD: 9.01 K/UL — SIGNIFICANT CHANGE UP (ref 3.8–10.5)
WBC # FLD AUTO: 9.01 K/UL — SIGNIFICANT CHANGE UP (ref 3.8–10.5)

## 2024-03-20 PROCEDURE — 93306 TTE W/DOPPLER COMPLETE: CPT | Mod: 26

## 2024-03-20 PROCEDURE — 90935 HEMODIALYSIS ONE EVALUATION: CPT

## 2024-03-20 PROCEDURE — 93356 MYOCRD STRAIN IMG SPCKL TRCK: CPT

## 2024-03-20 RX ADMIN — HEPARIN SODIUM 5000 UNIT(S): 5000 INJECTION INTRAVENOUS; SUBCUTANEOUS at 05:18

## 2024-03-20 RX ADMIN — HEPARIN SODIUM 5000 UNIT(S): 5000 INJECTION INTRAVENOUS; SUBCUTANEOUS at 15:10

## 2024-03-20 RX ADMIN — URSODIOL 300 MILLIGRAM(S): 250 TABLET, FILM COATED ORAL at 17:35

## 2024-03-20 RX ADMIN — URSODIOL 300 MILLIGRAM(S): 250 TABLET, FILM COATED ORAL at 05:18

## 2024-03-20 RX ADMIN — CEFTRIAXONE 100 MILLIGRAM(S): 500 INJECTION, POWDER, FOR SOLUTION INTRAMUSCULAR; INTRAVENOUS at 14:21

## 2024-03-20 RX ADMIN — CARVEDILOL PHOSPHATE 3.12 MILLIGRAM(S): 80 CAPSULE, EXTENDED RELEASE ORAL at 05:18

## 2024-03-20 RX ADMIN — CARVEDILOL PHOSPHATE 3.12 MILLIGRAM(S): 80 CAPSULE, EXTENDED RELEASE ORAL at 17:35

## 2024-03-20 RX ADMIN — HEPARIN SODIUM 5000 UNIT(S): 5000 INJECTION INTRAVENOUS; SUBCUTANEOUS at 21:35

## 2024-03-20 RX ADMIN — AZITHROMYCIN 255 MILLIGRAM(S): 500 TABLET, FILM COATED ORAL at 15:09

## 2024-03-20 NOTE — PROGRESS NOTE ADULT - SUBJECTIVE AND OBJECTIVE BOX
Date of Service: 03-20-24 @ 16:19    Patient is a 63y old  Female who presents with a chief complaint of SOB x 2 weeks (20 Mar 2024 13:30)    Any change in ROS:  Seen in HD  O2 sats 95% on room air  Dyspnea improving    MEDICATIONS  (STANDING):  azithromycin  IVPB      azithromycin  IVPB 500 milliGRAM(s) IV Intermittent every 24 hours  carvedilol 3.125 milliGRAM(s) Oral every 12 hours  cefTRIAXone   IVPB      cefTRIAXone   IVPB 1000 milliGRAM(s) IV Intermittent every 24 hours  heparin   Injectable 5000 Unit(s) SubCutaneous every 8 hours  ursodiol Capsule 300 milliGRAM(s) Oral two times a day    Vital Signs Last 24 Hrs  T(C): 36.3 (20 Mar 2024 13:05), Max: 37 (20 Mar 2024 04:31)  T(F): 97.3 (20 Mar 2024 13:05), Max: 98.6 (20 Mar 2024 04:31)  HR: 102 (20 Mar 2024 13:05) (88 - 102)  BP: 119/79 (20 Mar 2024 13:05) (114/71 - 142/78)  BP(mean): --  RR: 18 (20 Mar 2024 13:05) (18 - 20)  SpO2: 97% (20 Mar 2024 13:05) (95% - 97%)    Parameters below as of 20 Mar 2024 13:05  Patient On (Oxygen Delivery Method): room air        I&O's Summary    19 Mar 2024 07:01  -  20 Mar 2024 07:00  --------------------------------------------------------  IN: 820 mL / OUT: 0 mL / NET: 820 mL    20 Mar 2024 07:01  -  20 Mar 2024 16:19  --------------------------------------------------------  IN: 0 mL / OUT: 2000 mL / NET: -2000 mL          Physical Exam:   GENERAL: NAD  HEENT: CASEY  ENMT: No tonsillar erythema, exudates, or enlargement  NECK: Supple, No JVD  CHEST/LUNG: Few b/l crackles   CVS: Regular rate and rhythm  GI: : Soft, Nontender, Nondistended  NERVOUS SYSTEM:  Alert & Oriented X3  EXTREMITIES:  2+ Peripheral Pulses, No clubbing, cyanosis, or edema  SKIN: No rashes or lesions  PSYCH: Appropriate    Labs:  ABG - ( 19 Mar 2024 00:11 )  pH, Arterial: 7.51  pH, Blood: x     /  pCO2: 31    /  pO2: 152   / HCO3: 25    / Base Excess: 2.5   /  SaO2: 98.7        21, 21  CARDIAC MARKERS ( 18 Mar 2024 22:24 )  x     / x     / x     / x     / 4.9 ng/mL  CARDIAC MARKERS ( 18 Mar 2024 17:20 )  x     / x     / 63 U/L / x     / 4.8 ng/mL                          10.8   9.01  )-----------( 110      ( 20 Mar 2024 07:35 )             36.4                         14.7   29.02 )-----------( 221      ( 19 Mar 2024 00:17 )             46.5                         12.5   20.81 )-----------( 160      ( 18 Mar 2024 20:53 )             39.8                         14.3   18.09 )-----------( 183      ( 18 Mar 2024 08:54 )             47.3     03-20    139  |  96  |  43<H>  ----------------------------<  72  4.6   |  22  |  6.45<H>  03-19    134<L>  |  92<L>  |  9   ----------------------------<  124<H>  3.1<L>   |  21<L>  |  1.94<H>  03-18    135  |  105  |  37<H>  ----------------------------<  132<H>  5.5<H>   |  14<L>  |  6.70<H>  03-18    139  |  103  |  32<H>  ----------------------------<  129<H>  5.0   |  17<L>  |  6.40<H>  03-18    140  |  102  |  27<H>  ----------------------------<  105<H>  4.4   |  19<L>  |  6.10<H>    Ca    9.3      20 Mar 2024 07:33  Ca    10.0      19 Mar 2024 00:17  Ca    9.0      18 Mar 2024 20:53  Ca    9.5      18 Mar 2024 17:20  Phos  5.0     03-20  Phos  1.7     03-19  Phos  5.4     03-18  Mg     2.3     03-20  Mg     1.9     03-19  Mg     2.0     03-18    TPro  9.2<H>  /  Alb  5.0  /  TBili  5.6<H>  /  DBili  0.6<H>  /  AST  27  /  ALT  15  /  AlkPhos  131<H>  03-19  TPro  7.3  /  Alb  3.9  /  TBili  3.3<H>  /  DBili  x   /  AST  20  /  ALT  12  /  AlkPhos  98  03-18  TPro  8.4<H>  /  Alb  4.4  /  TBili  3.5<H>  /  DBili  x   /  AST  28  /  ALT  14  /  AlkPhos  112  03-18  TPro  8.5<H>  /  Alb  4.6  /  TBili  4.8<H>  /  DBili  x   /  AST  20  /  ALT  15  /  AlkPhos  111  03-18    CAPILLARY BLOOD GLUCOSE          LIVER FUNCTIONS - ( 19 Mar 2024 00:17 )  Alb: 5.0 g/dL / Pro: 9.2 g/dL / ALK PHOS: 131 U/L / ALT: 15 U/L / AST: 27 U/L / GGT: x           PT/INR - ( 19 Mar 2024 00:17 )   PT: 13.1 sec;   INR: 1.26 ratio         PTT - ( 19 Mar 2024 00:17 )  PTT:39.2 sec  Urinalysis Basic - ( 20 Mar 2024 07:33 )    Color: x / Appearance: x / SG: x / pH: x  Gluc: 72 mg/dL / Ketone: x  / Bili: x / Urobili: x   Blood: x / Protein: x / Nitrite: x   Leuk Esterase: x / RBC: x / WBC x   Sq Epi: x / Non Sq Epi: x / Bacteria: x      Procalcitonin, Serum: 0.76 ng/mL (03-18 @ 17:20)  Lactate, Blood: 0.8 mmol/L (03-18 @ 12:39)        RECENT CULTURES:  03-19 @ 18:19 .Stool Feces                No enteric pathogens to date: Final culture pending    03-19 @ 15:39 .Sputum Sputum       Moderate polymorphonuclear leukocytes per low power field  Moderate Squamous epithelial cells per low power field  Few Gram positive cocci in pairs seen per oil power field  Rare Gram Positive Rods seen per oil power field  Rare Gram Negative Rods seen per oil power field             03-18 @ 17:22 .Blood Blood                No growth at 24 hours    03-18 @ 10:46 Clean Catch Clean Catch (Midstream)                <10,000 CFU/mL Normal Urogenital Caroline    03-18 @ 10:12 .Blood Blood-Peripheral                No growth at 48 Hours    03-18 @ 09:50 .Blood Blood-Peripheral                No growth at 48 Hours      Studies  Chest X-RAY  < from: Xray Chest 1 View- PORTABLE-Urgent (Xray Chest 1 View- PORTABLE-Urgent .) (03.18.24 @ 09:45) >    INTERPRETATION:  CLINICAL HISTORY: Shortness of breath.    COMPARISON: Chest radiograph dated 11/26/2023.    TECHNIQUE: Portable frontal chest radiograph. Adequate positioning.    FINDINGS:    Support devices: Left axillary and left subclavian/brachiocephalic   stents.  Right-sided chest wall catheter with tip terminating in the   region of the superior cavoatrial junction.    Cardiac/mediastinum/hilum: Heart size is poorly evaluated on AP   projection however the cardiac silhouette appears enlarged.    Lung parenchyma/Pleura: Diffuse increased interstitial markings and   peribronchial cuffing consistent with pulmonary edema. No visualized   pleural effusions or pneumothorax.    Skeleton/soft tissues: No acute pathology.      IMPRESSION:    There is increased interstitial markings and peribronchial cuffing   consistent with pulmonary edema.    --- End of Report ---      < end of copied text >    CT SCAN Chest   < from: CT Chest No Cont (03.18.24 @ 09:21) >    FINDINGS:    LUNGS, pleura, AND AIRWAYS: Patent central airways.  Bilateral   interlobular septal thickening and hazy opacities. Small bilateral   pleural effusions with adjacent dependent patchy opacity. Partially   imaged multiseptated cystic regions and right lower lobe.  MEDIASTINUM AND DAVION: Calcified mediastinal and right hilar lymph nodes.  VESSELS: Left subclavian graft. Right-sided central line with tip in the   SVC.  HEART: Heart is mildly enlarged No pericardial effusion.  CHEST WALL AND LOWER NECK: Within normal limits.  VISUALIZED UPPER ABDOMEN: Significantly enlarged spleen.  BONES: Degenerative changes.    IMPRESSION:  Fluid overload with small bilateral pleural effusions with or without   superimposed infectious/inflammatory process. Partially imaged   multiseptated cystic regions right lower lobe. Recommend short-term   follow-up CT .    --- End of Report ---      < end of copied text >

## 2024-03-20 NOTE — PROGRESS NOTE ADULT - SUBJECTIVE AND OBJECTIVE BOX
[facility] DIVISION OF KIDNEY DISEASES AND HYPERTENSION -- HEMODIALYSIS NOTE  --------------------------------------------------------------------------------  Chief Complaint: ESRD/Ongoing hemodialysis requirement    24 hour events/subjective:  Seen at HD  Tolerating well       PAST HISTORY  --------------------------------------------------------------------------------  No significant changes to PMH, PSH, FHx, SHx, unless otherwise noted    ALLERGIES & MEDICATIONS  --------------------------------------------------------------------------------  Allergies    No Known Allergies    Intolerances      Standing Inpatient Medications  azithromycin  IVPB 500 milliGRAM(s) IV Intermittent every 24 hours  azithromycin  IVPB      carvedilol 3.125 milliGRAM(s) Oral every 12 hours  cefTRIAXone   IVPB      cefTRIAXone   IVPB 1000 milliGRAM(s) IV Intermittent every 24 hours  heparin   Injectable 5000 Unit(s) SubCutaneous every 8 hours  ursodiol Capsule 300 milliGRAM(s) Oral two times a day    PRN Inpatient Medications      REVIEW OF SYSTEMS  --------------------------------------------------------------------------------  Gen: No weight changes, fatigue, fevers/chills, weakness  Skin: No rashes  Head/Eyes/Ears/Mouth: No headache; Normal hearing; Normal vision w/o blurriness; No sinus pain/discomfort, sore throat  Respiratory: No dyspnea, cough, wheezing, hemoptysis  CV: No chest pain, PND, orthopnea  GI: No abdominal pain, diarrhea, constipation, nausea, vomiting, melena, hematochezia  : No increased frequency, dysuria, hematuria, nocturia  MSK: No joint pain/swelling; no back pain; no edema  Neuro: No dizziness/lightheadedness, weakness, seizures, numbness, tingling  Heme: No easy bruising or bleeding  Endo: No heat/cold intolerance  Psych: No significant nervousness, anxiety, stress, depression    All other systems were reviewed and are negative, except as noted.    VITALS/PHYSICAL EXAM  --------------------------------------------------------------------------------  T(C): 36.3 (03-20-24 @ 13:05), Max: 37 (03-20-24 @ 04:31)  HR: 102 (03-20-24 @ 13:05) (88 - 102)  BP: 119/79 (03-20-24 @ 13:05) (114/71 - 142/78)  RR: 18 (03-20-24 @ 13:05) (18 - 20)  SpO2: 97% (03-20-24 @ 13:05) (95% - 97%)  Wt(kg): --  Height (cm): 157.5 (03-18-24 @ 20:18)  Weight (kg): 52.7 (03-18-24 @ 20:18)  BMI (kg/m2): 21.2 (03-18-24 @ 20:18)  BSA (m2): 1.52 (03-18-24 @ 20:18)      03-19-24 @ 07:01  -  03-20-24 @ 07:00  --------------------------------------------------------  IN: 820 mL / OUT: 0 mL / NET: 820 mL      Physical Exam:  	   PHYSICAL EXAM: vital signs as above  in no apparent distress  Neck: Supple, no JVD,    Lungs: no rhonchi, no wheeze, no crackles  CVS: S1 S2 no M/R/G  Abdomen: no tenderness, no organomegaly, BS present  Neuro: Grossly intact  Skin: warm, dry  Ext: no cyanosis or clubbing, no edema  Access:+ avf, mild erythema present      LABS/STUDIES  --------------------------------------------------------------------------------              10.8   9.01  >-----------<  110      [03-20-24 @ 07:35]              36.4     139  |  96  |  43  ----------------------------<  72      [03-20-24 @ 07:33]  4.6   |  22  |  6.45        Ca     9.3     [03-20-24 @ 07:33]      Mg     2.3     [03-20-24 @ 07:33]      Phos  5.0     [03-20-24 @ 07:33]    TPro  9.2  /  Alb  5.0  /  TBili  5.6  /  DBili  0.6  /  AST  27  /  ALT  15  /  AlkPhos  131  [03-19-24 @ 00:17]    PT/INR: PT 13.1 , INR 1.26       [03-19-24 @ 00:17]  PTT: 39.2       [03-19-24 @ 00:17]    CK 63      [03-18-24 @ 17:20]    Iron 86, TIBC 143, %sat 61      [11-27-23 @ 13:56]  Ferritin 1726      [11-27-23 @ 13:57]  PTH -- (Ca 8.9)      [11-28-23 @ 06:56]   89

## 2024-03-20 NOTE — PROGRESS NOTE ADULT - SUBJECTIVE AND OBJECTIVE BOX
HPI:  63 year old female with hx of polycythemia, ESRD, on HD M/W/F presenting with shortness of breath.   Patient states she has had progressively worsening shortness of breath over the last 2 weeks. Last HD Friday.   Patient woke up this morning, urinated and subsequently had acute onset shortness of breath.   Patient denies chest pain, vomiting, abdominal pain, fevers, coughing, sick contacts.    In thee d patient had CT chest shows Fluid overload with small bilateral pleural effusions with or without   superimposed infectious/inflammatory process. Partially imaged   multiseptated cystic regions right lower lobe (18 Mar 2024 16:43)    No events     s/p MICU TRANSFER       PAST MEDICAL & SURGICAL HISTORY:  ESRD on dialysis      Anemia secondary to renal failure      Polycythemia          Review of Systems:   CONSTITUTIONAL: No fever, weight loss, or fatigue  EYES: No eye pain, visual disturbances, or discharge  ENMT:  No difficulty hearing, tinnitus, vertigo; No sinus or throat pain  NECK: No pain or stiffness  BREASTS: No pain, masses, or nipple discharge  RESPIRATORY: No cough, wheezing, chills or hemoptysis; No shortness of breath  CARDIOVASCULAR: No chest pain, palpitations, dizziness, or leg swelling  GASTROINTESTINAL: No abdominal or epigastric pain. No nausea, vomiting, or hematemesis; No diarrhea or constipation. No melena or hematochezia.  GENITOURINARY: No dysuria, frequency, hematuria, or incontinence  NEUROLOGICAL: No headaches, memory loss, loss of strength, numbness, or tremors  SKIN: No itching, burning, rashes, or lesions   LYMPH NODES: No enlarged glands  ENDOCRINE: No heat or cold intolerance; No hair loss  MUSCULOSKELETAL: No joint pain or swelling; No muscle, back, or extremity pain  PSYCHIATRIC: No depression, anxiety, mood swings, or difficulty sleeping  HEME/LYMPH: No easy bruising, or bleeding gums  ALLERY AND IMMUNOLOGIC: No hives or eczema    Allergies    No Known Allergies    Intolerances        Social History:     FAMILY HISTORY:      MEDICATIONS  (STANDING):  azithromycin  IVPB      azithromycin  IVPB 500 milliGRAM(s) IV Intermittent every 24 hours  carvedilol 3.125 milliGRAM(s) Oral every 12 hours  cefTRIAXone   IVPB      cefTRIAXone   IVPB 1000 milliGRAM(s) IV Intermittent every 24 hours  heparin   Injectable 5000 Unit(s) SubCutaneous every 8 hours  ursodiol Capsule 300 milliGRAM(s) Oral two times a day    MEDICATIONS  (PRN):        CAPILLARY BLOOD GLUCOSE        I&O's Summary    19 Mar 2024 07:01  -  20 Mar 2024 07:00  --------------------------------------------------------  IN: 820 mL / OUT: 0 mL / NET: 820 mL    20 Mar 2024 07:01  -  20 Mar 2024 17:59  --------------------------------------------------------  IN: 0 mL / OUT: 2000 mL / NET: -2000 mL        PHYSICAL EXAM:  GENERAL: NAD, well-developed  HEAD:  Atraumatic, Normocephalic  EYES: EOMI, PERRLA, conjunctiva and sclera clear  NECK: Supple, No JVD  CHEST/LUNG: Clear to auscultation bilaterally; No wheeze  HEART: Regular rate and rhythm; No murmurs, rubs, or gallops  ABDOMEN: Soft, Nontender, Nondistended; Bowel sounds present  EXTREMITIES:  2+ Peripheral Pulses, No clubbing, cyanosis, or edema  PSYCH: AAOx3  NEUROLOGY: non-focal  SKIN: No rashes or lesions    LABS:                        10.8   9.01  )-----------( 110      ( 20 Mar 2024 07:35 )             36.4     03-20    139  |  96  |  43<H>  ----------------------------<  72  4.6   |  22  |  6.45<H>    Ca    9.3      20 Mar 2024 07:33  Phos  5.0     03-20  Mg     2.3     03-20    TPro  9.2<H>  /  Alb  5.0  /  TBili  5.6<H>  /  DBili  0.6<H>  /  AST  27  /  ALT  15  /  AlkPhos  131<H>  03-19    PT/INR - ( 19 Mar 2024 00:17 )   PT: 13.1 sec;   INR: 1.26 ratio         PTT - ( 19 Mar 2024 00:17 )  PTT:39.2 sec  CARDIAC MARKERS ( 18 Mar 2024 22:24 )  x     / x     / x     / x     / 4.9 ng/mL      Urinalysis Basic - ( 20 Mar 2024 07:33 )    Color: x / Appearance: x / SG: x / pH: x  Gluc: 72 mg/dL / Ketone: x  / Bili: x / Urobili: x   Blood: x / Protein: x / Nitrite: x   Leuk Esterase: x / RBC: x / WBC x   Sq Epi: x / Non Sq Epi: x / Bacteria: x        RADIOLOGY & ADDITIONAL TESTS:    Imaging Personally Reviewed:    Consultant(s) Notes Reviewed:      Care Discussed with Consultants/Other Providers:

## 2024-03-20 NOTE — PROVIDER CONTACT NOTE (OTHER) - BACKGROUND
63 y.o. F PMHx significant for Polycythemia vera MAK 2+ (2012) w/ secondary myelofibrosis, ESRD on HD (M/W/F) via LUE AVF

## 2024-03-20 NOTE — PROGRESS NOTE ADULT - ASSESSMENT
Acute Hypoxic respiratory Failure secondary to Pneumonia   Sepsis present on admission   Pneumonia   Anemia   Myelofibrosis     s/p MICU stay     CT chest shows Fluid overload with small bilateral pleural effusions with or without   superimposed infectious/inflammatory process. Partially imaged   multiseptated cystic regions right lower lobe  Leukocytosis  HD as per renal   GI consult   Hme following   Hold Hydroxyurea       plan of care discussed with patient at length

## 2024-03-20 NOTE — PROGRESS NOTE ADULT - ASSESSMENT
62 y/o F with PMH of polycythemia, ESRD, on HD M/W/F presenting with shortness of breath. Patient states she has had progressively worsening shortness of breath over the last 2 weeks. Pulmonary called to consult for SOB, likely 2nd to pulm edema.    SOB hypoxic resp failure  ESRD  Polycythemia    SOB, hypoxic resp failure  -2nd to fluid overload +/- underlying PNA  -Hypoxia, SOB improving. Tolerating room air today  -Keep sats >90% with O2 PRN  -Keep O>I as tolerated  -Complete short course of ABX  -CXR in AM.     ESRD  -HD per renal.     Polycythemia  -Per heme/onc

## 2024-03-20 NOTE — PROVIDER CONTACT NOTE (OTHER) - ASSESSMENT
Pt received HD Tx, A/Ox4, vss, denies pain or discomfort. Observed +redness on AVF without pain. Kraig Barth notified.

## 2024-03-21 LAB
ANION GAP SERPL CALC-SCNC: 19 MMOL/L — HIGH (ref 5–17)
BUN SERPL-MCNC: 23 MG/DL — SIGNIFICANT CHANGE UP (ref 7–23)
CALCIUM SERPL-MCNC: 10.1 MG/DL — SIGNIFICANT CHANGE UP (ref 8.4–10.5)
CHLORIDE SERPL-SCNC: 95 MMOL/L — LOW (ref 96–108)
CO2 SERPL-SCNC: 25 MMOL/L — SIGNIFICANT CHANGE UP (ref 22–31)
CREAT SERPL-MCNC: 4.66 MG/DL — HIGH (ref 0.5–1.3)
CULTURE RESULTS: ABNORMAL
CULTURE RESULTS: SIGNIFICANT CHANGE UP
EGFR: 10 ML/MIN/1.73M2 — LOW
GLUCOSE SERPL-MCNC: 76 MG/DL — SIGNIFICANT CHANGE UP (ref 70–99)
HCT VFR BLD CALC: 38.8 % — SIGNIFICANT CHANGE UP (ref 34.5–45)
HGB BLD-MCNC: 12.1 G/DL — SIGNIFICANT CHANGE UP (ref 11.5–15.5)
MAGNESIUM SERPL-MCNC: 2.2 MG/DL — SIGNIFICANT CHANGE UP (ref 1.6–2.6)
MCHC RBC-ENTMCNC: 30.4 PG — SIGNIFICANT CHANGE UP (ref 27–34)
MCHC RBC-ENTMCNC: 31.2 GM/DL — LOW (ref 32–36)
MCV RBC AUTO: 97.5 FL — SIGNIFICANT CHANGE UP (ref 80–100)
NRBC # BLD: 0 /100 WBCS — SIGNIFICANT CHANGE UP (ref 0–0)
OB PNL STL: NEGATIVE — SIGNIFICANT CHANGE UP
PHOSPHATE SERPL-MCNC: 4 MG/DL — SIGNIFICANT CHANGE UP (ref 2.5–4.5)
PLATELET # BLD AUTO: 127 K/UL — LOW (ref 150–400)
POTASSIUM SERPL-MCNC: 3.7 MMOL/L — SIGNIFICANT CHANGE UP (ref 3.5–5.3)
POTASSIUM SERPL-SCNC: 3.7 MMOL/L — SIGNIFICANT CHANGE UP (ref 3.5–5.3)
RBC # BLD: 3.98 M/UL — SIGNIFICANT CHANGE UP (ref 3.8–5.2)
RBC # FLD: 20.8 % — HIGH (ref 10.3–14.5)
SODIUM SERPL-SCNC: 139 MMOL/L — SIGNIFICANT CHANGE UP (ref 135–145)
SPECIMEN SOURCE: SIGNIFICANT CHANGE UP
SPECIMEN SOURCE: SIGNIFICANT CHANGE UP
WBC # BLD: 9.95 K/UL — SIGNIFICANT CHANGE UP (ref 3.8–10.5)
WBC # FLD AUTO: 9.95 K/UL — SIGNIFICANT CHANGE UP (ref 3.8–10.5)

## 2024-03-21 PROCEDURE — 99223 1ST HOSP IP/OBS HIGH 75: CPT | Mod: GC

## 2024-03-21 PROCEDURE — 71045 X-RAY EXAM CHEST 1 VIEW: CPT | Mod: 26

## 2024-03-21 RX ADMIN — CARVEDILOL PHOSPHATE 3.12 MILLIGRAM(S): 80 CAPSULE, EXTENDED RELEASE ORAL at 06:03

## 2024-03-21 RX ADMIN — CARVEDILOL PHOSPHATE 3.12 MILLIGRAM(S): 80 CAPSULE, EXTENDED RELEASE ORAL at 18:51

## 2024-03-21 RX ADMIN — URSODIOL 300 MILLIGRAM(S): 250 TABLET, FILM COATED ORAL at 17:59

## 2024-03-21 RX ADMIN — AZITHROMYCIN 255 MILLIGRAM(S): 500 TABLET, FILM COATED ORAL at 12:14

## 2024-03-21 RX ADMIN — CEFTRIAXONE 100 MILLIGRAM(S): 500 INJECTION, POWDER, FOR SOLUTION INTRAMUSCULAR; INTRAVENOUS at 11:33

## 2024-03-21 RX ADMIN — HEPARIN SODIUM 5000 UNIT(S): 5000 INJECTION INTRAVENOUS; SUBCUTANEOUS at 06:03

## 2024-03-21 RX ADMIN — HEPARIN SODIUM 5000 UNIT(S): 5000 INJECTION INTRAVENOUS; SUBCUTANEOUS at 17:59

## 2024-03-21 NOTE — DIETITIAN INITIAL EVALUATION ADULT - NS FNS DIET ORDER
Diet, DASH/TLC:   Sodium & Cholesterol Restricted  For patients receiving Renal Replacement - No Protein Restr, No Conc K, No Conc Phos, Low Sodium (RENAL) (03-18-24 @ 14:03) [Active]

## 2024-03-21 NOTE — PROGRESS NOTE ADULT - SUBJECTIVE AND OBJECTIVE BOX
Date of Service: 03-21-24 @ 15:30    Patient is a 63y old  Female who presents with a chief complaint of SOB x 2 weeks (21 Mar 2024 14:15)      Any change in ROS:  doing pretty good:  no sob:  no cough : no phlegm : on room air     MEDICATIONS  (STANDING):  carvedilol 3.125 milliGRAM(s) Oral every 12 hours  cefTRIAXone   IVPB      cefTRIAXone   IVPB 1000 milliGRAM(s) IV Intermittent every 24 hours  heparin   Injectable 5000 Unit(s) SubCutaneous every 8 hours  ursodiol Capsule 300 milliGRAM(s) Oral two times a day    MEDICATIONS  (PRN):    Vital Signs Last 24 Hrs  T(C): 36.6 (21 Mar 2024 12:02), Max: 37.2 (20 Mar 2024 23:22)  T(F): 97.9 (21 Mar 2024 12:02), Max: 98.9 (20 Mar 2024 23:22)  HR: 91 (21 Mar 2024 12:02) (91 - 93)  BP: 138/85 (21 Mar 2024 12:02) (105/78 - 158/84)  BP(mean): --  RR: 18 (21 Mar 2024 12:02) (17 - 18)  SpO2: 94% (21 Mar 2024 12:02) (93% - 95%)    Parameters below as of 21 Mar 2024 12:02  Patient On (Oxygen Delivery Method): room air        I&O's Summary    20 Mar 2024 07:01  -  21 Mar 2024 07:00  --------------------------------------------------------  IN: 240 mL / OUT: 2000 mL / NET: -1760 mL    21 Mar 2024 07:01  -  21 Mar 2024 15:30  --------------------------------------------------------  IN: 290 mL / OUT: 0 mL / NET: 290 mL          Physical Exam:   GENERAL: NAD, well-groomed, well-developed  HEENT: CASEY/   Atraumatic, Normocephalic  ENMT: No tonsillar erythema, exudates, or enlargement; Moist mucous membranes, Good dentition, No lesions  NECK: Supple, No JVD, Normal thyroid  CHEST/LUNG: minimal crackles rigth base   CVS: Regular rate and rhythm; No murmurs, rubs, or gallops  GI: : Soft, Nontender, Nondistended; Bowel sounds present  NERVOUS SYSTEM:  Alert & Oriented X3  EXTREMITIES: - edema  LYMPH: No lymphadenopathy noted  SKIN: No rashes or lesions  ENDOCRINOLOGY: No Thyromegaly  PSYCH: Appropriate    Labs:  21, 21                            12.1   9.95  )-----------( 127      ( 21 Mar 2024 07:35 )             38.8                         10.8   9.01  )-----------( 110      ( 20 Mar 2024 07:35 )             36.4                         14.7   29.02 )-----------( 221      ( 19 Mar 2024 00:17 )             46.5                         12.5   20.81 )-----------( 160      ( 18 Mar 2024 20:53 )             39.8                         14.3   18.09 )-----------( 183      ( 18 Mar 2024 08:54 )             47.3     03-21    139  |  95<L>  |  23  ----------------------------<  76  3.7   |  25  |  4.66<H>  03-20    139  |  96  |  43<H>  ----------------------------<  72  4.6   |  22  |  6.45<H>  03-19    134<L>  |  92<L>  |  9   ----------------------------<  124<H>  3.1<L>   |  21<L>  |  1.94<H>  03-18    135  |  105  |  37<H>  ----------------------------<  132<H>  5.5<H>   |  14<L>  |  6.70<H>  03-18    139  |  103  |  32<H>  ----------------------------<  129<H>  5.0   |  17<L>  |  6.40<H>  03-18    140  |  102  |  27<H>  ----------------------------<  105<H>  4.4   |  19<L>  |  6.10<H>    Ca    10.1      21 Mar 2024 07:32  Ca    9.3      20 Mar 2024 07:33  Phos  4.0     03-21  Phos  5.0     03-20  Mg     2.2     03-21  Mg     2.3     03-20    TPro  9.2<H>  /  Alb  5.0  /  TBili  5.6<H>  /  DBili  0.6<H>  /  AST  27  /  ALT  15  /  AlkPhos  131<H>  03-19  TPro  7.3  /  Alb  3.9  /  TBili  3.3<H>  /  DBili  x   /  AST  20  /  ALT  12  /  AlkPhos  98  03-18  TPro  8.4<H>  /  Alb  4.4  /  TBili  3.5<H>  /  DBili  x   /  AST  28  /  ALT  14  /  AlkPhos  112  03-18  TPro  8.5<H>  /  Alb  4.6  /  TBili  4.8<H>  /  DBili  x   /  AST  20  /  ALT  15  /  AlkPhos  111  03-18    CAPILLARY BLOOD GLUCOSE      rad< from: Xray Chest 1 View- PORTABLE-Urgent (Xray Chest 1 View- PORTABLE-Urgent .) (03.18.24 @ 09:45) >  FINDINGS:    Support devices: Left axillary and left subclavian/brachiocephalic   stents.  Right-sided chest wall catheter with tip terminating in the   region of the superior cavoatrial junction.    Cardiac/mediastinum/hilum: Heart size is poorly evaluated on AP   projection however the cardiac silhouette appears enlarged.    Lung parenchyma/Pleura: Diffuse increased interstitial markings and   peribronchial cuffing consistent with pulmonary edema. No visualized   pleural effusions or pneumothorax.    Skeleton/soft tissues: No acute pathology.      IMPRESSION:    There is increased interstitial markings and peribronchial cuffing   consistent with pulmonary edema.    --- End of Report ---           BROOK ALLEN MD; Resident Radiologist  This document has been electronically signed.   SHARMAINE LOPEZ DO; Attending Radiologist  This document has been electronically signed. Mar 18 2024 10:55AM    < end of copied text >  < from: CT Chest No Cont (03.18.24 @ 09:21) >    FINDINGS:    LUNGS, pleura, AND AIRWAYS: Patent central airways.  Bilateral   interlobular septal thickening and hazy opacities. Small bilateral   pleural effusions with adjacent dependent patchy opacity. Partially   imaged multiseptated cystic regions and right lower lobe.  MEDIASTINUM AND DAVION: Calcified mediastinal and right hilar lymph nodes.  VESSELS: Left subclavian graft. Right-sided central line with tip in the   SVC.  HEART: Heart is mildly enlarged No pericardial effusion.  CHEST WALL AND LOWER NECK: Within normal limits.  VISUALIZED UPPER ABDOMEN: Significantly enlarged spleen.  BONES: Degenerative changes.    IMPRESSION:  Fluid overload with small bilateral pleural effusions with or without   superimposed infectious/inflammatory process. Partially imaged   multiseptated cystic regions right lower lobe. Recommend short-term   follow-up CT .    --- End of Report ---          ZACH GUZMAN DO; Resident Radiologist  This document has been electronically signed.  QUICNY DOWNING MD; Attending Radiologist  This document has been electronically signed. Mar 18 2024 10:18AM    < end of copied text >          Urinalysis Basic - ( 21 Mar 2024 07:32 )    Color: x / Appearance: x / SG: x / pH: x  Gluc: 76 mg/dL / Ketone: x  / Bili: x / Urobili: x   Blood: x / Protein: x / Nitrite: x   Leuk Esterase: x / RBC: x / WBC x   Sq Epi: x / Non Sq Epi: x / Bacteria: x      Procalcitonin, Serum: 0.76 ng/mL (03-18 @ 17:20)  Lactate, Blood: 0.8 mmol/L (03-18 @ 12:39)        RECENT CULTURES:  03-19 @ 18:19 .Stool Feces                No enteric pathogens to date: Final culture pending    03-19 @ 15:39 .Sputum Sputum       Moderate polymorphonuclear leukocytes per low power field  Moderate Squamous epithelial cells per low power field  Few Gram positive cocci in pairs seen per oil power field  Rare Gram Positive Rods seen per oil power field  Rare Gram Negative Rods seen per oil power field           Normal Respiratory Caroline present    03-18 @ 17:22 .Blood Blood                No growth at 48 Hours    03-18 @ 10:46 Clean Catch Clean Catch (Midstream)                <10,000 CFU/mL Normal Urogenital Caroline    03-18 @ 10:12 .Blood Blood-Peripheral                No growth at 72 Hours    03-18 @ 09:50 .Blood Blood-Peripheral                No growth at 72 Hours          RESPIRATORY CULTURES:          Studies  Chest X-RAY  CT SCAN Chest   Venous Dopplers: LE:   CT Abdomen  Others

## 2024-03-21 NOTE — DIETITIAN INITIAL EVALUATION ADULT - OTHER INFO
- Renal: ESRD on HD, renal electrolytes currently WDL though noted K, phosphorus, and sodium not WDL until today. Pt received HD 3/19 and 3/20 with 2.5L and 2L removed respectively  ---> Anemia secondary to ESRD noted   - Pt admitted to MICU 3/18 with SOB found secondary to sepsis in setting of pneumonia, transfer to medicine 3/19  ---> Ordered for IV antibiotics   - Noted with fluid overload upon admission, resolving per chart s/p order for IV lasix 3/18

## 2024-03-21 NOTE — DIETITIAN INITIAL EVALUATION ADULT - REASON INDICATOR FOR ASSESSMENT
Consult for MST score of 2 or >  Sources: Medical Record, pt French speaking, consented to free translation services via  [employer] #[id number]   Chart reviewed, events noted.

## 2024-03-21 NOTE — CONSULT NOTE ADULT - ASSESSMENT
63F with hx of polycythemia c/b myelofibrosis (on Ojjaara, hydrea), ESRD (M/W/F) presenting with dyspnea in setting og volume overload and PNA; course further notable for diarrhea    Impression  #chronic diarrhea; ddx include medication induced (Ojjaara given chronicity with initiation of this) vs. less likely malignancy vs. less likely infectious or inflammatory including IBD or microscopic colitis  - 3 months hx, 4-5x per day; with some improvement after decreasing Ojjaara dosage; no change in symptoms with cessation of milk products  - Last colonoscopy 2017 with polyps; told to repeat in 5-7 years  - Ojjaara for her myelofibrosis in 12/2023 after which she noted her diarrhea; associated with diarrhea in 20-22% of patients; when her dose was de-escalated, she noted some improvement in the texture of her stools    Recommendations  - stool studies (electrolytes, pH, fecal elastase)  - fecal calprotectin  - GI PCR  - stool O&P    Note and recommendations are incomplete until reviewed and attested by attending.    Yosef Hernandez MD  GI/Hepatology Fellow, PGY4  Teams preferred (7AM to 5PM); after 5PM, call GI fellow on call    NEW CONSULTS:  Please email [email or web address] OR [email or web address] with hx of polycythemia c/b myelofibrosis (on Ojjaara, hydrea), ESRD (M/W/F) presenting with dyspnea in setting og volume overload and PNA; course further notable for diarrhea    Impression  #chronic diarrhea; ddx include medication induced (Ojjaara given chronicity with initiation of this) vs. less likely malignancy vs. less likely infectious or inflammatory including IBD or microscopic colitis  - 3 months hx, 4-5x per day; with some improvement after decreasing Ojjaara dosage; no change in symptoms with cessation of milk products  - Last colonoscopy 2017 with polyps; told to repeat in 5-7 years  - Ojjaara for her myelofibrosis in 12/2023 after which she noted her diarrhea; associated with diarrhea in 20-22% of patients; when her dose was de-escalated, she noted some improvement in the texture of her stools  #NRH with noncirrhotic portal hypertension    Recommendations  - stool studies (electrolytes, pH, fecal elastase)  - fecal calprotectin  - GI PCR, C diff  - stool O&P  - If stool tests are negative, would start trial of Imodium PRN  - Given recent respiratory failure and fluid overload, would prefer to hold off on colonoscopy/EGD for variceal screening. If diarrhea persists through next week off Ojjaara, will consider colonoscopy/EGD if cleared by pulmonary.      Note and recommendations are incomplete until reviewed and attested by attending.    Yosef Hernandez MD  GI/Hepatology Fellow, PGY4  Teams preferred (7AM to 5PM); after 5PM, call GI fellow on call    NEW CONSULTS:  Please email [email or web address] OR [email or web address]

## 2024-03-21 NOTE — DIETITIAN INITIAL EVALUATION ADULT - ORAL INTAKE PTA/DIET HISTORY
Pt reports good typical intake, though endorses a decrease in intake secondary to persistent for 2-3 months. Notes her appetite is the same but she is worried to eat and cause worsening diarrhea. Pt eats 2 eggs/day and does not eat much meat. Follows a renal diet, noting low potassium and low salt specifically. Confirms NKFA. No issues chewing/swallowing noted. Per H&P pt takes linette-deonna rx multivitamin at home.

## 2024-03-21 NOTE — DIETITIAN INITIAL EVALUATION ADULT - PERTINENT MEDS FT
MEDICATIONS  (STANDING):  azithromycin  IVPB      azithromycin  IVPB 500 milliGRAM(s) IV Intermittent every 24 hours  carvedilol 3.125 milliGRAM(s) Oral every 12 hours  cefTRIAXone   IVPB      cefTRIAXone   IVPB 1000 milliGRAM(s) IV Intermittent every 24 hours  heparin   Injectable 5000 Unit(s) SubCutaneous every 8 hours  ursodiol Capsule 300 milliGRAM(s) Oral two times a day    MEDICATIONS  (PRN):

## 2024-03-21 NOTE — DIETITIAN INITIAL EVALUATION ADULT - NSFNSGIIOFT_GEN_A_CORE
I&O's Detail    20 Mar 2024 07:01  -  21 Mar 2024 07:00  --------------------------------------------------------  IN:    Oral Fluid: 240 mL  Total IN: 240 mL    OUT:    Other (mL): 2000 mL  Total OUT: 2000 mL    Total NET: -1760 mL      21 Mar 2024 07:01  -  21 Mar 2024 11:22  --------------------------------------------------------  IN:    Oral Fluid: 240 mL  Total IN: 240 mL    OUT:  Total OUT: 0 mL    Total NET: 240 mL

## 2024-03-21 NOTE — DIETITIAN INITIAL EVALUATION ADULT - PERSON TAUGHT/METHOD
Spoke with pt about diarrhea nutrition therapy, recommended rice, bread, and applesauce, also recommended bland foods to decrease GI distress/GERD symptoms.   Provided education on meeting adequate protein-energy needs, emphasized the importance of adequate calorie and protein intake in setting of HD. Recommended small, frequent meals, snacking on nutrient dense foods, consuming oral nutrition supplements. Encouraged prioritizing protein foods and consuming adequate amounts of protein at each meal for preservation of lean muscle mass. Obtained food preferences, will honor as able.   Pt declined renal edu. Pt aware RD remains available./verbal instruction/patient instructed

## 2024-03-21 NOTE — DIETITIAN INITIAL EVALUATION ADULT - ENERGY INTAKE
Pt endorses ~50-75 % intake of trays in-house. Pt declined Nepro shakes but was amenable to trying LPS. Provided instruction on how to get LPS when home. No food preferences at this time.  Fair (50-75%)

## 2024-03-21 NOTE — CONSULT NOTE ADULT - ATTENDING COMMENTS
Agree with above. Patient admitted for fluid overload/respiratory failure, s/p intubation/extubation and fluid removal via dialysis. CT still shows pleural effusions. She has had chronic diarrhea ever since starting Ojjaara which she attributes the diarrhea to, though she still has diarrhea now off the Ojjaara and still has diarrhea. Would check stool workup as above - if no improvement in diarrhea off Ojjaara and stool tests negative, can consider colonoscopy /EGD next week if medically optimized, vs outpatient.
ESRD admitted with fluid overload requiring BiPAP  Needs urgent HD   Will need to get HD in a monitored setting  UF 2-3 liters as tolerated  Maintain antibiotics    Rest as per Dr. Juanita Zhou MD  O: [phone]  Contact me on teams
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 Jacky Guo at [facility], ESRD on HD (MW) via JESÚS DAIGLE (2015) presented for fluid overload w/ concomitant PNA likely 2/2 ESRD, with RRT called for concern of flash pulmonary edema requiring urgent HD. Heme was consulted for the management of myelofibrosis.     #Polycythemia vera w/ secondary myelofibrosis  #ESRD on HD   #Splenomegaly  PV MAK+ dz 2012 with progression to myelofibrosis initially on Jakafi and Danazol, now on Ojjaara and hydroxyurea.  WBC 29.02, Hgb 14.7 (Improved from ~7.3 on last admission), Plt: 221 (Improved from ~90k)  Reported side effects of Ojjaara have included increased risk of infections and edema which may have contributed to presentation of PNA and fluid overload. Ojjaara has also been reported with increased risk of cardiovascular events - would hold inpatient.  - Hold hydroxyurea - blood counts have been stable

## 2024-03-21 NOTE — PROGRESS NOTE ADULT - SUBJECTIVE AND OBJECTIVE BOX
HPI:  63 year old female with hx of polycythemia, ESRD, on HD M/W/F presenting with shortness of breath.   Patient states she has had progressively worsening shortness of breath over the last 2 weeks. Last HD Friday.   Patient woke up this morning, urinated and subsequently had acute onset shortness of breath.   Patient denies chest pain, vomiting, abdominal pain, fevers, coughing, sick contacts.    In thee d patient had CT chest shows Fluid overload with small bilateral pleural effusions with or without   superimposed infectious/inflammatory process. Partially imaged   multiseptated cystic regions right lower lobe (18 Mar 2024 16:43)    No events     s/p MICU TRANSFER   No events     T(C): 36.6 (03-21-24 @ 12:02), Max: 36.6 (03-21-24 @ 04:42)  HR: 91 (03-21-24 @ 12:02) (91 - 93)  BP: 138/85 (03-21-24 @ 12:02) (123/77 - 138/85)  RR: 18 (03-21-24 @ 12:02) (17 - 18)  SpO2: 94% (03-21-24 @ 12:02) (93% - 95%)      MEDICATIONS  (STANDING):  carvedilol 3.125 milliGRAM(s) Oral every 12 hours  cefTRIAXone   IVPB      cefTRIAXone   IVPB 1000 milliGRAM(s) IV Intermittent every 24 hours  heparin   Injectable 5000 Unit(s) SubCutaneous every 8 hours  ursodiol Capsule 300 milliGRAM(s) Oral two times a day    MEDICATIONS  (PRN):        PAST MEDICAL & SURGICAL HISTORY:  ESRD on dialysis      Anemia secondary to renal failure      Polycythemia          Review of Systems:   CONSTITUTIONAL: No fever, weight loss, or fatigue  EYES: No eye pain, visual disturbances, or discharge  ENMT:  No difficulty hearing, tinnitus, vertigo; No sinus or throat pain  NECK: No pain or stiffness  BREASTS: No pain, masses, or nipple discharge  RESPIRATORY: No cough, wheezing, chills or hemoptysis; No shortness of breath  CARDIOVASCULAR: No chest pain, palpitations, dizziness, or leg swelling  GASTROINTESTINAL: No abdominal or epigastric pain. No nausea, vomiting, or hematemesis; No diarrhea or constipation. No melena or hematochezia.  GENITOURINARY: No dysuria, frequency, hematuria, or incontinence  NEUROLOGICAL: No headaches, memory loss, loss of strength, numbness, or tremors  SKIN: No itching, burning, rashes, or lesions   LYMPH NODES: No enlarged glands  ENDOCRINE: No heat or cold intolerance; No hair loss  MUSCULOSKELETAL: No joint pain or swelling; No muscle, back, or extremity pain  PSYCHIATRIC: No depression, anxiety, mood swings, or difficulty sleeping  HEME/LYMPH: No easy bruising, or bleeding gums  ALLERY AND IMMUNOLOGIC: No hives or eczema    Allergies    No Known Allergies    Intolerances        Social History:     FAMILY HISTORY:          CAPILLARY BLOOD GLUCOSE        I&O's Summary    19 Mar 2024 07:01  -  20 Mar 2024 07:00  --------------------------------------------------------  IN: 820 mL / OUT: 0 mL / NET: 820 mL    20 Mar 2024 07:01  -  20 Mar 2024 17:59  --------------------------------------------------------  IN: 0 mL / OUT: 2000 mL / NET: -2000 mL        PHYSICAL EXAM:  GENERAL: NAD, well-developed  HEAD:  Atraumatic, Normocephalic  EYES: EOMI, PERRLA, conjunctiva and sclera clear  NECK: Supple, No JVD  CHEST/LUNG: Clear to auscultation bilaterally; No wheeze  HEART: Regular rate and rhythm; No murmurs, rubs, or gallops  ABDOMEN: Soft, Nontender, Nondistended; Bowel sounds present  EXTREMITIES:  2+ Peripheral Pulses, No clubbing, cyanosis, or edema  PSYCH: AAOx3  NEUROLOGY: non-focal  SKIN: No rashes or lesions                          12.1   9.95  )-----------( 127      ( 21 Mar 2024 07:35 )             38.8               139|95|23<76  3.7|25|4.66  10.1,2.2,4.0  03-21 @ 07:32          RADIOLOGY & ADDITIONAL TESTS:    Imaging Personally Reviewed:    Consultant(s) Notes Reviewed:      Care Discussed with Consultants/Other Providers:

## 2024-03-21 NOTE — CONSULT NOTE ADULT - ASSESSMENT
ASSESSMENT: 64yo F s/p brachiobasilic AVF in 2017 c/b thrombosis and venous outflow stenosis with multiple angioplasties, most recent in Feb 2024. Vascular consulted to evaluate erythema and aneurysmal changes of fistula. Per pt changes have been present for 2 years, however she has needed longer pressure held over fistula for last several months.     RECOMMENDATIONS:   - please obtain fistula duplex to eval fistula  - vascular to follow up results  - continue using fistula for HD    Discussed with surgical fellow on behalf of attending     Vascular Surgery [id number]

## 2024-03-21 NOTE — CONSULT NOTE ADULT - SUBJECTIVE AND OBJECTIVE BOX
Chief Complaint:  Patient is a 63y old  Female who presents with a chief complaint of Acute pulmonary edema    HPI:  Offered  services but pt requested her daughter interpret for her.  63F with hx of polycythemia c/b myelofibrosis (on Ojjaara, hydrea), ESRD (M/W/F) presenting with dyspnea that has progressively worsened over the last 2 weeks. Last HD Friday prior to admission; course notable for volume overload and PNA for which pt has underwent HD with volume removal and antibiotics. Course further notable for diarrhea for which GI is called. Pt reports diarrhea for the past 3 months, described initially as watery without blood; 4-5x per day; since then, she has noted slow improvement in the texture with stools now being less watery and more pasty. of note, pt had a BM yesterday with small amount of bright red blood (described as specks) in the toilet bowel. No associated abdominal pain or vomiting. Last colonoscopy 2017 with polyps; told to repeat in 5-7 years. Of note, pt reports starting Ojjaara for her myelofibrosis in 12/2023 after which she noted her diarrhea; when her dose was de-escalated, she noted some improvement in the texture of her stools.    Allergies:  No Known Allergies      Home Medications:    Hospital Medications:  azithromycin  IVPB      azithromycin  IVPB 500 milliGRAM(s) IV Intermittent every 24 hours  carvedilol 3.125 milliGRAM(s) Oral every 12 hours  cefTRIAXone   IVPB      cefTRIAXone   IVPB 1000 milliGRAM(s) IV Intermittent every 24 hours  heparin   Injectable 5000 Unit(s) SubCutaneous every 8 hours  ursodiol Capsule 300 milliGRAM(s) Oral two times a day      PMHX/PSHX:  ESRD on dialysis    Anemia secondary to renal failure    Polycythemia        Family history:      Denies family history of colon cancer/polyps, stomach cancer/polyps, pancreatic cancer/masses, liver cancer/disease, ovarian cancer and endometrial cancer.    ROS:  General:  No wt loss, fevers, chills  ENT:  No dysphagia  CV:  No pain, palpitations  Pulm:  No dyspnea, cough  GI:  No pain, No nausea, No vomiting, No diarrhea, No constipation, No rectal bleeding, No tarry stools, No dysphagia,  Muscle:  No pain, weakness  Neuro:  No weakness  Heme:  No ecchymosis  Skin:  No rash    PHYSICAL EXAM:  GENERAL:  No acute distress  HEENT:  no scleral icterus  CHEST:  no accessory muscle use  HEART:  Regular rate and rhythm  ABDOMEN:  Soft, non-tender, non-distended  EXTREMITIES: No edema  SKIN:  No rash/ecchymoses  NEURO:  Alert and oriented x 3    Vital Signs:  Vital Signs Last 24 Hrs  T(C): 36.6 (21 Mar 2024 12:02), Max: 37.2 (20 Mar 2024 23:22)  T(F): 97.9 (21 Mar 2024 12:02), Max: 98.9 (20 Mar 2024 23:22)  HR: 91 (21 Mar 2024 12:02) (91 - 93)  BP: 138/85 (21 Mar 2024 12:02) (105/78 - 158/84)  BP(mean): --  RR: 18 (21 Mar 2024 12:02) (17 - 18)  SpO2: 94% (21 Mar 2024 12:02) (93% - 95%)    Parameters below as of 21 Mar 2024 12:02  Patient On (Oxygen Delivery Method): room air      Daily     Daily     LABS:                        12.1   9.95  )-----------( 127      ( 21 Mar 2024 07:35 )             38.8     Mean Cell Volume: 97.5 fl (03-21-24 @ 07:35)    03-21    139  |  95<L>  |  23  ----------------------------<  76  3.7   |  25  |  4.66<H>    Ca    10.1      21 Mar 2024 07:32  Phos  4.0     03-21  Mg     2.2     03-21          Urinalysis Basic - ( 21 Mar 2024 07:32 )    Color: x / Appearance: x / SG: x / pH: x  Gluc: 76 mg/dL / Ketone: x  / Bili: x / Urobili: x   Blood: x / Protein: x / Nitrite: x   Leuk Esterase: x / RBC: x / WBC x   Sq Epi: x / Non Sq Epi: x / Bacteria: x                              12.1   9.95  )-----------( 127      ( 21 Mar 2024 07:35 )             38.8                         10.8   9.01  )-----------( 110      ( 20 Mar 2024 07:35 )             36.4                         14.7   29.02 )-----------( 221      ( 19 Mar 2024 00:17 )             46.5                         12.5   20.81 )-----------( 160      ( 18 Mar 2024 20:53 )             39.8     Chief Complaint:  Patient is a 63y old  Female who presents with a chief complaint of Acute pulmonary edema    HPI:  Offered  services but pt requested her daughter interpret for her.  63F with hx of polycythemia c/b myelofibrosis (on Ojjaara, hydrea), ESRD (M/W/F) presenting with dyspnea that has progressively worsened over the last 2 weeks. Last HD Friday prior to admission; course notable for volume overload and PNA for which pt has underwent HD with volume removal and antibiotics. Course further notable for diarrhea for which GI is called. Pt reports diarrhea for the past 3 months, described initially as watery without blood; 4-5x per day; since then, she has noted slow improvement in the texture with stools now being less watery and more pasty. of note, pt had a BM yesterday with small amount of bright red blood (described as specks) in the toilet bowel. No associated abdominal pain or vomiting. Last colonoscopy 2017 with polyps; told to repeat in 5-7 years. Of note, pt reports starting Ojjaara for her myelofibrosis in 12/2023 after which she noted her diarrhea; when her dose was de-escalated, she noted some improvement in the texture of her stools. She's not on Ojjaara now but still reports diarrhea.    Allergies:  No Known Allergies      Home Medications:    Hospital Medications:  azithromycin  IVPB      azithromycin  IVPB 500 milliGRAM(s) IV Intermittent every 24 hours  carvedilol 3.125 milliGRAM(s) Oral every 12 hours  cefTRIAXone   IVPB      cefTRIAXone   IVPB 1000 milliGRAM(s) IV Intermittent every 24 hours  heparin   Injectable 5000 Unit(s) SubCutaneous every 8 hours  ursodiol Capsule 300 milliGRAM(s) Oral two times a day      PMHX/PSHX:  ESRD on dialysis    Anemia secondary to renal failure    Polycythemia        Family history:      Denies family history of colon cancer/polyps, stomach cancer/polyps, pancreatic cancer/masses, liver cancer/disease, ovarian cancer and endometrial cancer.    ROS:  General:  No wt loss, fevers, chills  ENT:  No dysphagia  CV:  No pain, palpitations  Pulm:  No dyspnea, cough  GI:  No pain, No nausea, No vomiting, No diarrhea, No constipation, No rectal bleeding, No tarry stools, No dysphagia,  Muscle:  No pain, weakness  Neuro:  No weakness  Heme:  No ecchymosis  Skin:  No rash    PHYSICAL EXAM:  GENERAL:  No acute distress  HEENT:  no scleral icterus  CHEST:  no accessory muscle use  HEART:  Regular rate and rhythm  ABDOMEN:  Soft, non-tender, non-distended  EXTREMITIES: No edema  SKIN:  No rash/ecchymoses  NEURO:  Alert and oriented x 3    Vital Signs:  Vital Signs Last 24 Hrs  T(C): 36.6 (21 Mar 2024 12:02), Max: 37.2 (20 Mar 2024 23:22)  T(F): 97.9 (21 Mar 2024 12:02), Max: 98.9 (20 Mar 2024 23:22)  HR: 91 (21 Mar 2024 12:02) (91 - 93)  BP: 138/85 (21 Mar 2024 12:02) (105/78 - 158/84)  BP(mean): --  RR: 18 (21 Mar 2024 12:02) (17 - 18)  SpO2: 94% (21 Mar 2024 12:02) (93% - 95%)    Parameters below as of 21 Mar 2024 12:02  Patient On (Oxygen Delivery Method): room air      Daily     Daily     LABS:                        12.1   9.95  )-----------( 127      ( 21 Mar 2024 07:35 )             38.8     Mean Cell Volume: 97.5 fl (03-21-24 @ 07:35)    03-21    139  |  95<L>  |  23  ----------------------------<  76  3.7   |  25  |  4.66<H>    Ca    10.1      21 Mar 2024 07:32  Phos  4.0     03-21  Mg     2.2     03-21          Urinalysis Basic - ( 21 Mar 2024 07:32 )    Color: x / Appearance: x / SG: x / pH: x  Gluc: 76 mg/dL / Ketone: x  / Bili: x / Urobili: x   Blood: x / Protein: x / Nitrite: x   Leuk Esterase: x / RBC: x / WBC x   Sq Epi: x / Non Sq Epi: x / Bacteria: x                              12.1   9.95  )-----------( 127      ( 21 Mar 2024 07:35 )             38.8                         10.8   9.01  )-----------( 110      ( 20 Mar 2024 07:35 )             36.4                         14.7   29.02 )-----------( 221      ( 19 Mar 2024 00:17 )             46.5                         12.5   20.81 )-----------( 160      ( 18 Mar 2024 20:53 )             39.8

## 2024-03-21 NOTE — PROGRESS NOTE ADULT - ASSESSMENT
64 y/o F with PMH of polycythemia, ESRD, on HD M/W/F presenting with shortness of breath. Patient states she has had progressively worsening shortness of breath over the last 2 weeks. Pulmonary called to consult for SOB, likely 2nd to pulm edema.    SOB hypoxic resp failure  ESRD  Polycythemia    SOB, hypoxic resp failure  -2nd to fluid overload +/- underlying PNA  -Hypoxia, SOB improving. Tolerating room air today  -Keep sats >90% with O2 PRN  -Keep O>I as tolerated  -Comp today is clear:     ESRD  -HD per renal.     Polycythemia  -Per heme/onc

## 2024-03-21 NOTE — DIETITIAN INITIAL EVALUATION ADULT - ADD RECOMMEND
1) Recommend discontinuing DASH diet, continue Renal diet restriction, defer diet texture/fluid consistency to team   2) Add LPS 2x/day, encourage protein-rich foods, obtain and honor preferences as able   3) Malnutrition sticker placed in chart   4) Nutrition education provided, RD available to answer questions and reinforce prn  5) If diarrhea persists, consider adding lactobacillus acidophilus in setting of antibiotics  6) Monitor nutritional intake, diet tolerance, labs, hydration, GI function, skin integrity and wt trends  1) Recommend discontinuing DASH diet, continue Renal diet restriction, defer diet texture/fluid consistency to team   2) Add LPS 2x/day, encourage protein-rich foods, obtain and honor preferences as able   3) Malnutrition sticker placed in chart   4) Nutrition education provided, RD available to answer questions and reinforce prn  5) If diarrhea persists, consider adding lactobacillus acidophilus in setting of antibiotics  6) Add nephro-deonna daily for micronutrient coverage pending no medical contraindications   7) Monitor nutritional intake, diet tolerance, labs, hydration, GI function, skin integrity and wt trends

## 2024-03-21 NOTE — DIETITIAN INITIAL EVALUATION ADULT - NSFNSGIASSESSMENTFT_GEN_A_CORE
Pt denies nausea though endorses dry heaving each morning and having reflux symptoms. Endorses persistent diarrhea for 2-3 months, RD mentioned provider may want to try imodium and pt endorsed having tried at home and it caused constipation. Last BM: 3/20  Not currently ordered for a bowel regimen. Noted on IV antibiotics, consider adding lactobacillus acidophilus

## 2024-03-21 NOTE — DIETITIAN INITIAL EVALUATION ADULT - OTHER CALCULATIONS
Calculations based on latest/lowest daily wt (3/20) with consideration for ESRD on HD, malnutrition   Fluid needs deferred to team

## 2024-03-21 NOTE — DIETITIAN INITIAL EVALUATION ADULT - PERTINENT LABORATORY DATA
03-21    139  |  95<L>  |  23  ----------------------------<  76  3.7   |  25  |  4.66<H>    Ca    10.1      21 Mar 2024 07:32  Phos  4.0     03-21  Mg     2.2     03-21    A1C with Estimated Average Glucose Result: 4.3 % (03-19-24 @ 00:17)

## 2024-03-21 NOTE — PROGRESS NOTE ADULT - ASSESSMENT
Acute Hypoxic respiratory Failure secondary to Pneumonia   Sepsis present on admission   Pneumonia   Anemia   Myelofibrosis   AVF erythema   Vassc eval     s/p MICU stay     CT chest shows Fluid overload with small bilateral pleural effusions with or without   superimposed infectious/inflammatory process. Partially imaged   multiseptated cystic regions right lower lobe  Leukocytosis  HD as per renal   Hme following   Hold Hydroxyurea     Diarrhea resolved   GI consulted     plan of care discussed with patient at length

## 2024-03-21 NOTE — DIETITIAN INITIAL EVALUATION ADULT - NS FNS REASON FOR WEIGHT CHANG
Pt reported UBW of 56kg/123.2 pounds and endorses wt loss of 3kg/6.6 pounds x2-3 months. Current dosing wt 53kg/116.6 pounds.    Daily wts in pounds: 108, 112 (3/20) 116 (3/18)    Noted pt with generalized edema throughout admission until 3/20, potential further wt loss seen per daily wts likely secondary to fluid losses.     Potential 15 pound, 12% wt loss x 2-3 months, clinically significant. RD to continue to monitor wt as able   IBW: 110 pounds/decreased po intake

## 2024-03-21 NOTE — DIETITIAN INITIAL EVALUATION ADULT - ETIOLOGY
inability to consume sufficient energy/protein secondary to diarrhea  increased physiological demand for nutrients

## 2024-03-21 NOTE — DIETITIAN INITIAL EVALUATION ADULT - REASON
Nutrition focused physical exam deferred at this time, pt meets malnutrition criteria through wt loss and decreased intake parameters

## 2024-03-21 NOTE — CONSULT NOTE ADULT - SUBJECTIVE AND OBJECTIVE BOX
Vascular Surgery Consult    Consulting attending: Roselyn      HPI:  63 year old female with hx of polycythemia, ESRD, on HD M/W/F presenting with shortness of breath.   Patient states she has had progressively worsening shortness of breath over the last 2 weeks. Last HD Friday.   Patient woke up this morning, urinated and subsequently had acute onset shortness of breath.   Patient denies chest pain, vomiting, abdominal pain, fevers, coughing, sick contacts.    In thee d patient had CT chest shows Fluid overload with small bilateral pleural effusions with or without   superimposed infectious/inflammatory process. Partially imaged   multiseptated cystic regions right lower lobe     Vascular surgery consulted to evaluate LUE AVF erythema.         PAST MEDICAL HISTORY:  ESRD on dialysis    Anemia secondary to renal failure    Polycythemia          PAST SURGICAL HISTORY:        MEDICATIONS:  carvedilol 3.125 milliGRAM(s) Oral every 12 hours  cefTRIAXone   IVPB      cefTRIAXone   IVPB 1000 milliGRAM(s) IV Intermittent every 24 hours  heparin   Injectable 5000 Unit(s) SubCutaneous every 8 hours  ursodiol Capsule 300 milliGRAM(s) Oral two times a day        ALLERGIES:  No Known Allergies        VITALS & I/Os:  Vital Signs Last 24 Hrs  T(C): 36.6 (21 Mar 2024 12:02), Max: 37.2 (20 Mar 2024 23:22)  T(F): 97.9 (21 Mar 2024 12:02), Max: 98.9 (20 Mar 2024 23:22)  HR: 91 (21 Mar 2024 12:02) (91 - 93)  BP: 138/85 (21 Mar 2024 12:02) (105/78 - 158/84)  BP(mean): --  RR: 18 (21 Mar 2024 12:02) (17 - 18)  SpO2: 94% (21 Mar 2024 12:02) (93% - 95%)    Parameters below as of 21 Mar 2024 12:02  Patient On (Oxygen Delivery Method): room air        I&O's Summary    20 Mar 2024 07:01  -  21 Mar 2024 07:00  --------------------------------------------------------  IN: 240 mL / OUT: 2000 mL / NET: -1760 mL    21 Mar 2024 07:01  -  21 Mar 2024 17:22  --------------------------------------------------------  IN: 290 mL / OUT: 0 mL / NET: 290 mL          PHYSICAL EXAM:  General: No acute distress  Respiratory: Nonlabored  Cardiovascular: RRR  Abdominal: Soft, nondistended, nontender. No rebound or guarding. No organomegaly, no palpable mass.  Extremities: Warm  Vascular:  - RUE:  - LUE:  - RLE:  - LLE:       LABS:                        12.1   9.95  )-----------( 127      ( 21 Mar 2024 07:35 )             38.8     03-21    139  |  95<L>  |  23  ----------------------------<  76  3.7   |  25  |  4.66<H>    Ca    10.1      21 Mar 2024 07:32  Phos  4.0     03-21  Mg     2.2     03-21      Lactate:              Urinalysis Basic - ( 21 Mar 2024 07:32 )    Color: x / Appearance: x / SG: x / pH: x  Gluc: 76 mg/dL / Ketone: x  / Bili: x / Urobili: x   Blood: x / Protein: x / Nitrite: x   Leuk Esterase: x / RBC: x / WBC x   Sq Epi: x / Non Sq Epi: x / Bacteria: x          IMAGING:                                                                                               Vascular Surgery Consult    Consulting attending: Roselyn      HPI:  63 year old female with hx of polycythemia, ESRD, on HD M/W/F presenting with shortness of breath.   Patient states she has had progressively worsening shortness of breath over the last 2 weeks. Last HD Friday.   Patient woke up this morning, urinated and subsequently had acute onset shortness of breath.   Patient denies chest pain, vomiting, abdominal pain, fevers, coughing, sick contacts.    In thee d patient had CT chest shows Fluid overload with small bilateral pleural effusions with or without   superimposed infectious/inflammatory process. Partially imaged   multiseptated cystic regions right lower lobe     Vascular surgery consulted to evaluate LUE AVF erythema. LUE brachiobasilic AVF w BVT creation in 2017 c/b several thrombectomies/angioplasties of outflow vasculature including axillary v stenting. Most recent intervention was Feb 1 2024 which was balloon angioplasty of venous stenosis by Dr. Frederick. PT reports over last several months, she has required longer periods of time with pressure held on her fistula after HD to obtain hemostasis. She also reports the "redness" and "swelling" have been present for "at least 2 years".         PAST MEDICAL HISTORY:  ESRD on dialysis    Anemia secondary to renal failure    Polycythemia          PAST SURGICAL HISTORY:        MEDICATIONS:  carvedilol 3.125 milliGRAM(s) Oral every 12 hours  cefTRIAXone   IVPB      cefTRIAXone   IVPB 1000 milliGRAM(s) IV Intermittent every 24 hours  heparin   Injectable 5000 Unit(s) SubCutaneous every 8 hours  ursodiol Capsule 300 milliGRAM(s) Oral two times a day        ALLERGIES:  No Known Allergies        VITALS & I/Os:  Vital Signs Last 24 Hrs  T(C): 36.6 (21 Mar 2024 12:02), Max: 37.2 (20 Mar 2024 23:22)  T(F): 97.9 (21 Mar 2024 12:02), Max: 98.9 (20 Mar 2024 23:22)  HR: 91 (21 Mar 2024 12:02) (91 - 93)  BP: 138/85 (21 Mar 2024 12:02) (105/78 - 158/84)  BP(mean): --  RR: 18 (21 Mar 2024 12:02) (17 - 18)  SpO2: 94% (21 Mar 2024 12:02) (93% - 95%)    Parameters below as of 21 Mar 2024 12:02  Patient On (Oxygen Delivery Method): room air        I&O's Summary    20 Mar 2024 07:01  -  21 Mar 2024 07:00  --------------------------------------------------------  IN: 240 mL / OUT: 2000 mL / NET: -1760 mL    21 Mar 2024 07:01  -  21 Mar 2024 17:22  --------------------------------------------------------  IN: 290 mL / OUT: 0 mL / NET: 290 mL          PHYSICAL EXAM:  General: No acute distress  Respiratory: Nonlabored  Cardiovascular: RRR  Abdominal: Soft, nondistended, nontender. No rebound or guarding. No organomegaly, no palpable mass.  Extremities: Warm  Vascular: LUE AVF noted with aneurysmal changes and palpable soft thrill. Overlying punctate epidermal ulceration noted without dermal violation. Distal perfusion intact. Radial and ulnar pulse on L wrist palpable and L hand sensorimotor intact + warm.       LABS:                        12.1   9.95  )-----------( 127      ( 21 Mar 2024 07:35 )             38.8     03-21    139  |  95<L>  |  23  ----------------------------<  76  3.7   |  25  |  4.66<H>    Ca    10.1      21 Mar 2024 07:32  Phos  4.0     03-21  Mg     2.2     03-21      Lactate:              Urinalysis Basic - ( 21 Mar 2024 07:32 )    Color: x / Appearance: x / SG: x / pH: x  Gluc: 76 mg/dL / Ketone: x  / Bili: x / Urobili: x   Blood: x / Protein: x / Nitrite: x   Leuk Esterase: x / RBC: x / WBC x   Sq Epi: x / Non Sq Epi: x / Bacteria: x          IMAGING:

## 2024-03-22 DIAGNOSIS — J96.01 ACUTE RESPIRATORY FAILURE WITH HYPOXIA: ICD-10-CM

## 2024-03-22 DIAGNOSIS — D75.1 SECONDARY POLYCYTHEMIA: ICD-10-CM

## 2024-03-22 LAB
ANION GAP SERPL CALC-SCNC: 19 MMOL/L — HIGH (ref 5–17)
BUN SERPL-MCNC: 37 MG/DL — HIGH (ref 7–23)
CALCIUM SERPL-MCNC: 10.3 MG/DL — SIGNIFICANT CHANGE UP (ref 8.4–10.5)
CHLORIDE SERPL-SCNC: 97 MMOL/L — SIGNIFICANT CHANGE UP (ref 96–108)
CO2 SERPL-SCNC: 22 MMOL/L — SIGNIFICANT CHANGE UP (ref 22–31)
CREAT SERPL-MCNC: 6.95 MG/DL — HIGH (ref 0.5–1.3)
EGFR: 6 ML/MIN/1.73M2 — LOW
GI PCR PANEL: SIGNIFICANT CHANGE UP
GLUCOSE SERPL-MCNC: 103 MG/DL — HIGH (ref 70–99)
MAGNESIUM SERPL-MCNC: 2.4 MG/DL — SIGNIFICANT CHANGE UP (ref 1.6–2.6)
PHOSPHATE SERPL-MCNC: 5.7 MG/DL — HIGH (ref 2.5–4.5)
POTASSIUM SERPL-MCNC: 3.9 MMOL/L — SIGNIFICANT CHANGE UP (ref 3.5–5.3)
POTASSIUM SERPL-SCNC: 3.9 MMOL/L — SIGNIFICANT CHANGE UP (ref 3.5–5.3)
SODIUM SERPL-SCNC: 138 MMOL/L — SIGNIFICANT CHANGE UP (ref 135–145)

## 2024-03-22 PROCEDURE — 99233 SBSQ HOSP IP/OBS HIGH 50: CPT | Mod: GC

## 2024-03-22 PROCEDURE — 99232 SBSQ HOSP IP/OBS MODERATE 35: CPT | Mod: GC

## 2024-03-22 PROCEDURE — 93990 DOPPLER FLOW TESTING: CPT | Mod: 26

## 2024-03-22 RX ADMIN — CARVEDILOL PHOSPHATE 3.12 MILLIGRAM(S): 80 CAPSULE, EXTENDED RELEASE ORAL at 19:56

## 2024-03-22 RX ADMIN — URSODIOL 300 MILLIGRAM(S): 250 TABLET, FILM COATED ORAL at 19:56

## 2024-03-22 RX ADMIN — HEPARIN SODIUM 5000 UNIT(S): 5000 INJECTION INTRAVENOUS; SUBCUTANEOUS at 10:55

## 2024-03-22 RX ADMIN — CEFTRIAXONE 100 MILLIGRAM(S): 500 INJECTION, POWDER, FOR SOLUTION INTRAMUSCULAR; INTRAVENOUS at 12:15

## 2024-03-22 RX ADMIN — URSODIOL 300 MILLIGRAM(S): 250 TABLET, FILM COATED ORAL at 08:24

## 2024-03-22 RX ADMIN — HEPARIN SODIUM 5000 UNIT(S): 5000 INJECTION INTRAVENOUS; SUBCUTANEOUS at 02:47

## 2024-03-22 NOTE — PROGRESS NOTE ADULT - ASSESSMENT
63F with hx of polycythemia c/b myelofibrosis (on Ojjaara, hydrea), ESRD (M/W/F) presenting with dyspnea in setting og volume overload and PNA; course further notable for diarrhea    Impression  #chronic diarrhea; ddx include medication induced (Ojjaara given chronicity with initiation of this) vs. less likely malignancy vs. less likely infectious or inflammatory including IBD or microscopic colitis  - 3 months hx, 4-5x per day; with some improvement after decreasing Ojjaara dosage; no change in symptoms with cessation of milk products  - Last colonoscopy 2017 with polyps; told to repeat in 5-7 years  - Ojjaara for her myelofibrosis in 12/2023 after which she noted her diarrhea; associated with diarrhea in 20-22% of patients; when her dose was de-escalated, she noted some improvement in the texture of her stools  #NRH with noncirrhotic portal hypertension    Recommendations  - stool studies (electrolytes, pH, fecal elastase)  - fecal calprotectin  - GI PCR, C diff  - stool O&P  - If stool tests are negative, would start trial of Imodium PRN  - Given recent respiratory failure and fluid overload, would prefer to hold off on colonoscopy/EGD for variceal screening. If diarrhea persists through next week off Ojjaara, will consider colonoscopy/EGD if cleared by pulmonary    Note and recommendations are incomplete until reviewed and attested by attending.    Yosef Hernandez MD  GI/Hepatology Fellow, PGY4  Teams preferred (7AM to 5PM); after 5PM, call GI fellow on call    NEW CONSULTS:  Please email [email or web address] with hx of polycythemia c/b myelofibrosis (on Ojjaara, hydrea), ESRD (M/W/F) presenting with dyspnea in setting og volume overload and PNA; course further notable for diarrhea    Impression  #chronic diarrhea; ddx include medication induced (Ojjaara given chronicity with initiation of this) vs. less likely malignancy vs. less likely infectious or inflammatory including IBD or microscopic colitis  - 3 months hx, 4-5x per day; with some improvement after decreasing Ojjaara dosage; no change in symptoms with cessation of milk products  - Last colonoscopy 2017 with polyps; told to repeat in 5-7 years  - Ojjaara for her myelofibrosis in 12/2023 after which she noted her diarrhea; associated with diarrhea in 20-22% of patients; when her dose was de-escalated, she noted some improvement in the texture of her stools  #NRH with noncirrhotic portal hypertension    Recommendations  - plan for EGD/colonoscopy Monday; will need pulmonary clearance given hospital course  - stool studies (electrolytes, pH, fecal elastase)  - fecal calprotectin  - GI PCR, C diff  - stool O&P  - If stool tests are negative and pending colonoscopy, can consider trial of Imodium PRN    Note and recommendations are incomplete until reviewed and attested by attending.    Yosef Hernandez MD  GI/Hepatology Fellow, PGY4  Teams preferred (7AM to 5PM); after 5PM, call GI fellow on call    NEW CONSULTS:  Please email [email or web address] with hx of polycythemia c/b myelofibrosis (on Ojjaara, hydrea), ESRD (M/W/F) presenting with dyspnea in setting og volume overload and PNA; course further notable for diarrhea    Impression  #chronic diarrhea; ddx include medication induced (Ojjaara given chronicity with initiation of this) vs. less likely malignancy vs. less likely infectious or inflammatory including IBD or microscopic colitis  - 3 months hx, 4-5x per day; with some improvement after decreasing Ojjaara dosage; no change in symptoms with cessation of milk products  - Last colonoscopy 2017 with polyps; told to repeat in 5-7 years  - Ojjaara for her myelofibrosis in 12/2023 after which she noted her diarrhea; associated with diarrhea in 20-22% of patients; when her dose was de-escalated, she noted some improvement in the texture of her stools  #NRH with noncirrhotic portal hypertension    Recommendations  - plan for EGD for variceal screening and colonoscopy next week if persistent diarrhea; if improved, pt can follow up as outpatient  - stool studies (electrolytes, pH, fecal elastase)  - fecal calprotectin  - GI PCR, C diff  - stool O&P  - If stool tests are negative and pending colonoscopy, can consider trial of Imodium PRN    Note and recommendations are incomplete until reviewed and attested by attending.    Yosef Hernandez MD  GI/Hepatology Fellow, PGY4  Teams preferred (7AM to 5PM); after 5PM, call GI fellow on call    NEW CONSULTS:  Please email [email or web address] with hx of polycythemia c/b myelofibrosis (on Ojjaara, hydrea), ESRD (M/W/F) presenting with dyspnea in setting og volume overload and PNA; course further notable for diarrhea    Impression  #chronic diarrhea; ddx include medication induced (Ojjaara given chronicity with initiation of this) vs. less likely malignancy vs. 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    Yosef Hernandez MD  GI/Hepatology Fellow, PGY4  Teams preferred (7AM to 5PM); after 5PM, call GI fellow on call    NEW CONSULTS:  Please email [email or web address]

## 2024-03-22 NOTE — PROGRESS NOTE ADULT - ASSESSMENT
62 y/o F with PMH of polycythemia vera, ESRD on HD M/W/F presenting with shortness of breath. Patient states she has had progressively worsening shortness of breath over the last 2 weeks. Pulmonary called to consult for SOB, likely 2nd to pulm edema.

## 2024-03-22 NOTE — PROGRESS NOTE ADULT - SUBJECTIVE AND OBJECTIVE BOX
Date of Service: 03-22-24 @ 15:40    Patient is a 63y old  Female who presents with a chief complaint of SOB x 2 weeks (22 Mar 2024 14:14)    Any change in ROS:   No new respiratory events overnight. Denies SOB/CP.  O2 sats 96% RA     MEDICATIONS  (STANDING):  carvedilol 3.125 milliGRAM(s) Oral every 12 hours  cefTRIAXone   IVPB      cefTRIAXone   IVPB 1000 milliGRAM(s) IV Intermittent every 24 hours  heparin   Injectable 5000 Unit(s) SubCutaneous every 8 hours  ursodiol Capsule 300 milliGRAM(s) Oral two times a day    Vital Signs Last 24 Hrs  T(C): 36.5 (22 Mar 2024 11:38), Max: 37.1 (21 Mar 2024 19:34)  T(F): 97.7 (22 Mar 2024 11:38), Max: 98.8 (21 Mar 2024 19:34)  HR: 84 (22 Mar 2024 11:38) (78 - 86)  BP: 141/77 (22 Mar 2024 11:38) (122/70 - 141/77)  BP(mean): --  RR: 18 (22 Mar 2024 11:38) (18 - 18)  SpO2: 96% (22 Mar 2024 11:38) (92% - 96%)    Parameters below as of 22 Mar 2024 11:38  Patient On (Oxygen Delivery Method): room air    I&O's Summary    21 Mar 2024 07:01  -  22 Mar 2024 07:00  --------------------------------------------------------  IN: 530 mL / OUT: 0 mL / NET: 530 mL    Physical Exam:   GENERAL: NAD  HEENT: CASEY  ENMT: No tonsillar erythema, exudates, or enlargement  NECK: Supple, No JVD  CHEST/LUNG: Clear to auscultation b/l   CVS: Regular rate and rhythm  GI: : Soft, Nontender, Nondistended  NERVOUS SYSTEM:  Alert & Oriented X3  EXTREMITIES:  2+ Peripheral Pulses, No clubbing, cyanosis, or edema  SKIN: No rashes or lesions  PSYCH: Appropriate    Labs:  21, 21                            12.1   9.95  )-----------( 127      ( 21 Mar 2024 07:35 )             38.8                         10.8   9.01  )-----------( 110      ( 20 Mar 2024 07:35 )             36.4                         14.7   29.02 )-----------( 221      ( 19 Mar 2024 00:17 )             46.5                         12.5   20.81 )-----------( 160      ( 18 Mar 2024 20:53 )             39.8     03-22    138  |  97  |  37<H>  ----------------------------<  103<H>  3.9   |  22  |  6.95<H>  03-21    139  |  95<L>  |  23  ----------------------------<  76  3.7   |  25  |  4.66<H>  03-20    139  |  96  |  43<H>  ----------------------------<  72  4.6   |  22  |  6.45<H>  03-19    134<L>  |  92<L>  |  9   ----------------------------<  124<H>  3.1<L>   |  21<L>  |  1.94<H>  03-18    135  |  105  |  37<H>  ----------------------------<  132<H>  5.5<H>   |  14<L>  |  6.70<H>  03-18    139  |  103  |  32<H>  ----------------------------<  129<H>  5.0   |  17<L>  |  6.40<H>    Ca    10.3      22 Mar 2024 06:01  Ca    10.1      21 Mar 2024 07:32  Phos  5.7     03-22  Phos  4.0     03-21  Mg     2.4     03-22  Mg     2.2     03-21    TPro  9.2<H>  /  Alb  5.0  /  TBili  5.6<H>  /  DBili  0.6<H>  /  AST  27  /  ALT  15  /  AlkPhos  131<H>  03-19  TPro  7.3  /  Alb  3.9  /  TBili  3.3<H>  /  DBili  x   /  AST  20  /  ALT  12  /  AlkPhos  98  03-18  TPro  8.4<H>  /  Alb  4.4  /  TBili  3.5<H>  /  DBili  x   /  AST  28  /  ALT  14  /  AlkPhos  112  03-18    Urinalysis Basic - ( 22 Mar 2024 06:01 )    Color: x / Appearance: x / SG: x / pH: x  Gluc: 103 mg/dL / Ketone: x  / Bili: x / Urobili: x   Blood: x / Protein: x / Nitrite: x   Leuk Esterase: x / RBC: x / WBC x   Sq Epi: x / Non Sq Epi: x / Bacteria: x    Procalcitonin, Serum: 0.76 ng/mL (03-18 @ 17:20)    RECENT CULTURES:  03-19 @ 18:19 .Stool Feces         No enteric pathogens isolated.  (Stool culture examined for Salmonella,  Shigella, Campylobacter, Aeromonas, Plesiomonas,  Vibrio, E.coli O157 and Yersinia)    03-19 @ 15:39 .Sputum Sputum       Moderate polymorphonuclear leukocytes per low power field  Moderate Squamous epithelial cells per low power field  Few Gram positive cocci in pairs seen per oil power field  Rare Gram Positive Rods seen per oil power field  Rare Gram Negative Rods seen per oil power field    Normal Respiratory Caroline present    03-18 @ 17:22 .Blood Blood       No growth at 72 Hours    03-18 @ 10:46 Clean Catch Clean Catch (Midstream)       <10,000 CFU/mL Normal Urogenital Caroline    03-18 @ 10:12 .Blood Blood-Peripheral       No growth at 4 days    03-18 @ 09:50 .Blood Blood-Peripheral     No growth at 4 days    Studies  Chest X-RAY  < from: Xray Chest 1 View- PORTABLE-Routine (Xray Chest 1 View- PORTABLE-Routine in AM.) (03.21.24 @ 10:31) >    INTERPRETATION:  DATE OF STUDY: 3/21/24    PRIOR: 3/18/24    CLINICAL INDICATION: SOB    TECHNIQUE: AP radiograph of the chest.    FINDINGS:  Redemonstration of left axillary and left subclavian/brachiocephalic   stents.  Right-sided chest wall catheter with tip terminating in the region of the   superior cavoatrial junction.  Difficult to assess heart size on this AP projection.  Interval improvement in pulmonary edema changes.  No sizable pleural effusion. No pneumothorax.    IMPRESSION:  Interval improvement in pulmonary edema changes.    --- End of Report ---      < end of copied text >    CT SCAN Chest < from: CT Chest No Cont (03.18.24 @ 09:21) >    INTERPRETATION:  CLINICAL INFORMATION: Dyspnea    COMPARISON: Chest radiograph 3/20/2024.    CONTRAST/COMPLICATIONS:  IV Contrast: NONE  Oral Contrast: NONE  Complications: None reported at time of study completion    PROCEDURE:  CT of the Chest was performed.  Sagittal and coronal reformats were performed.    FINDINGS:    LUNGS, pleura, AND AIRWAYS: Patent central airways.  Bilateral   interlobular septal thickening and hazy opacities. Small bilateral   pleural effusions with adjacent dependent patchy opacity. Partially   imaged multiseptated cystic regions and right lower lobe.  MEDIASTINUM AND DAVION: Calcified mediastinal and right hilar lymph nodes.  VESSELS: Left subclavian graft. Right-sided central line with tip in the   SVC.  HEART: Heart is mildly enlarged No pericardial effusion.  CHEST WALL AND LOWER NECK: Within normal limits.  VISUALIZED UPPER ABDOMEN: Significantly enlarged spleen.  BONES: Degenerative changes.    IMPRESSION:  Fluid overload with small bilateral pleural effusions with or without   superimposed infectious/inflammatory process. Partially imaged   multiseptated cystic regions right lower lobe. Recommend short-term   follow-up CT .    --- End of Report ---    < end of copied text >    < from: TTE W or WO Ultrasound Enhancing Agent (03.20.24 @ 06:34) >    _______________________________________________________________________________________     CONCLUSIONS:      1. Left ventricular cavity is normal in size. Left ventricular wall thickness is normal. Left ventricular systolic function is normal with an ejection fraction of 62 % by Ayala's method of disks. There are no regional wall motion abnormalities seen.   2. Normal right ventricular cavity size, with normal wall thickness, and normal systolic function. Tricuspid annular plane systolic excursion (TAPSE) is 2.2 cm (normal >=1.7 cm).   3. Normal left and right atrial size.   4. Estimated pulmonary artery systolic pressure is 30 mmHg.   5. Trace pericardial effusion.   6. No prior echocardiogram is available for comparison.   7. There is increased LV mass and eccentric hypertrophy.    ________________________________________________________________________________________  FINDINGS:     Left Ventricle:  The left ventricular cavity is normal in size. Left ventricular wall thickness is normal. Left ventricular systolic function is normal with a calculated ejection fraction of 62 % by the Ayala's biplane method of disks. There are no regional wall motion abnormalities seen. The left ventricular diastolic function is indeterminate. Moderate left ventricular hypertrophy. There is increased LV mass and eccentric hypertrophy. Left ventricular global longitudinal strain is -18.4 % which is normal (< -18%). Images were acquired on a [employer] ultrasound system and processed using [employer] strain analysis software with a heart rate of 88 bpm and a blood pressure of 132/78 mmHg.     Right Ventricle:  The right ventricular cavity is normal in size, with normal wall thickness and normal systolic function. Tricuspid annular plane systolic excursion (TAPSE) is 2.2 cm (normal >=1.7 cm).     Left Atrium:  The left atrium is mildly dilated with an indexed volume of 40 ml/m².     Right Atrium:  The right atrium is normal in size with an indexed volume of 25.72 ml/m².     Interatrial Septum:  The interatrial septum appears intact.     Aortic Valve:  The aortic valve is tricuspid with normal leaflet excursion. There is no aortic valve stenosis. There is trace aortic regurgitation.     Mitral Valve:  Structurally normal mitral valve with normal leaflet excursion. There is no mitral valve stenosis. There is mild mitral regurgitation.     Tricuspid Valve:  Structurally normal tricuspid valve with normal leaflet excursion. There is trace tricuspid regurgitation. Estimated pulmonary artery systolic pressure is 30 mmHg.     Pulmonic Valve:  Structurally normal pulmonic valve with normal leaflet excursion. There is trace pulmonic regurgitation.     Aorta:  The aortic root appears normal in size. The aortic root at the sinuses of Valsalva is normal in size, measuring 3.10 cm (indexed 2.04 cm/m²). The ascending aorta diameter is normal in size, measuring 3.40 cm (indexed 2.24 cm/m²).     Pericardium:  There is a trace pericardial effusion.     Pleura:  Right pleural effusion noted.     Systemic Veins:  The inferior vena cava is normal in size measuring 1.81 cm in diameter, (normal <2.1cm) with abnormal inspiratory collapse (abnormal <50%) consistent with mildly elevated right atrial pressure (~8, range 5-10mmHg).  ____________________________________________________________________  QUANTITATIVE DATA:  Left Ventricle Measurements: (Indexed to BSA)     IVSd (2D):   0.9 cm  LVPWd (2D):  1.0 cm                           Strain Measurements:  LVIDd (2D):  5.3 cm                           Global Pk Long Strain:  -18.4 %  LVIDs (2D):  3.7 cm                           Endo Pk Strain A4C:     -23.4 %  LV Mass:     179 g  118.0 g/m²                Endo Pk Strain A2C:     -18.9 %  LV Vol d, MOD A2C: 97.6 ml  64.36 ml/m²       Endo Pk Strain A3C:     -13.0 %  LV Vol d, MOD A4C: 127.0 ml 83.75 ml/m²  LV Vol d, MOD BP:  112.3 ml 74.06 ml/m²  LV Vol s, MOD A2C: 42.1 ml  27.76 ml/m²  LV Vol s, MOD A4C: 39.7 ml  26.18 ml/m²  LV Vol s, MOD BP:  43.1 ml  28.40 ml/m²  LVOT SV MOD BP:    69.2 ml  LV EF% MOD BP:     62 %     MV E Vmax:    0.76 m/s  MV A Vmax:    1.10 m/s  MV E/A:       0.69  e' lateral:   7.05 cm/s  e' medial:    4.14 cm/s  E/e' lateral: 10.78  E/e' medial:  18.36  E/e' Average: 13.58  MV DT:        111 msec    Aorta Measurements: (Normal range) (Indexed to BSA)     Sinuses of Valsalva: 3.10 cm (2.7 - 3.3 cm)  Ao Asc prox:         3.40 cm       Left Atrium Measurements: (Indexed to BSA)  LA Diam 2D:        3.70 cm  LA Vol s, MOD A4C: 51.90 ml.  LA Vol s, MOD A2C: 50.00 ml.  LA Vol s, MOD BP:  51.00 ml  33.63 ml/m²    Right Ventricle Measurements: Right Atrial Measurements:     TAPSE:           2.2 cm       RA Vol:       39.00 ml  TV Sarahi. S':      18.30 cm/s   RA Vol Index: 25.72 ml/m²  RV Base (RVID1): 3.4 cm  RV Mid (RVID2):  3.2 cm       LVOT / RVOT/ Qp/Qs Data: (Indexed to BSA)  LVOT Diameter: 2.00 cm  LVOT Vmax:     1.02 m/s  LVOT VTI:      21.90 cm  LVOT SV:       68.8 ml  45.37 ml/m²    Mitral Valve Measurements:     MV Vmax:      0.86 m/s     MR Vmax:          5.24 m/s  MV VTI, sarahi   0.140 m      MR VTI:           170.00 cm  MV Sarahi d, A4C 3.50 cmMR Mean Gradient: 68.0 mmHg  MV Mean Grad: 1.0 mmHg     MR Peak Gradient: 109.8 mmHg  MV Peak Grad: 2.9 mmHg     MR PISA Radius:   0.60 cm  MV E Vmax:    0.8 m/s      MR Aliasing Jalen:  30.80 cm/s  MV A Vmax:    1.1 m/s      MR Regurg Fract:  0 %  MV E/A:       0.7       Tricuspid Valve Measurements:     TR Vmax:          2.3 m/s  TR Peak Gradient: 21.5 mmHg  RA Pressure:      8 mmHg  PASP:             30 mmHg      < end of copied text >

## 2024-03-22 NOTE — PROGRESS NOTE ADULT - ASSESSMENT
AVF Dopplers   Vascular folloing       Acute Hypoxic respiratory Failure secondary to Pneumonia   Sepsis present on admission   Pneumonia   Anemia   Myelofibrosis   AVF erythema   Vassc eval     s/p MICU stay     CT chest shows Fluid overload with small bilateral pleural effusions with or without   superimposed infectious/inflammatory process. Partially imaged   multiseptated cystic regions right lower lobe  Leukocytosis  HD as per renal   Hme following   Hold Hydroxyurea     Diarrhea resolved   GI consulted     plan of care discussed with patient at length

## 2024-03-22 NOTE — PROGRESS NOTE ADULT - SUBJECTIVE AND OBJECTIVE BOX
Chief Complaint:  Patient is a 63y old  Female who presents with a chief complaint of SOB x 2 weeks (21 Mar 2024 17:22)    Interval Events:  6BM over past 24h; bristol scale 6    Allergies:  No Known Allergies      Hospital Medications:  carvedilol 3.125 milliGRAM(s) Oral every 12 hours  cefTRIAXone   IVPB      cefTRIAXone   IVPB 1000 milliGRAM(s) IV Intermittent every 24 hours  heparin   Injectable 5000 Unit(s) SubCutaneous every 8 hours  ursodiol Capsule 300 milliGRAM(s) Oral two times a day      PMHX/PSHX:  ESRD on dialysis    Anemia secondary to renal failure    Polycythemia        Family history:      PHYSICAL EXAM:   Vital Signs:  Vital Signs Last 24 Hrs  T(C): 36.5 (22 Mar 2024 11:38), Max: 37.1 (21 Mar 2024 19:34)  T(F): 97.7 (22 Mar 2024 11:38), Max: 98.8 (21 Mar 2024 19:34)  HR: 84 (22 Mar 2024 11:38) (78 - 86)  BP: 141/77 (22 Mar 2024 11:38) (122/70 - 141/77)  BP(mean): --  RR: 18 (22 Mar 2024 11:38) (18 - 18)  SpO2: 96% (22 Mar 2024 11:38) (92% - 96%)    Parameters below as of 22 Mar 2024 11:38  Patient On (Oxygen Delivery Method): room air    Daily     Daily    GENERAL:  No acute distress  HEENT:  no scleral icterus  CHEST:  no accessory muscle use  HEART:  Regular rate and rhythm  ABDOMEN:  Soft, non-tender, non-distended  EXTREMITIES:  No edema  SKIN:  No rash/ecchymoses  NEURO:  Alert and oriented x 3    LABS:                        12.1   9.95  )-----------( 127      ( 21 Mar 2024 07:35 )             38.8       03-22    138  |  97  |  37<H>  ----------------------------<  103<H>  3.9   |  22  |  6.95<H>    Ca    10.3      22 Mar 2024 06:01  Phos  5.7     03-22  Mg     2.4     03-22      Urinalysis Basic - ( 22 Mar 2024 06:01 )    Color: x / Appearance: x / SG: x / pH: x  Gluc: 103 mg/dL / Ketone: x  / Bili: x / Urobili: x   Blood: x / Protein: x / Nitrite: x   Leuk Esterase: x / RBC: x / WBC x   Sq Epi: x / Non Sq Epi: x / Bacteria: x                              12.1   9.95  )-----------( 127      ( 21 Mar 2024 07:35 )             38.8                         10.8   9.01  )-----------( 110      ( 20 Mar 2024 07:35 )             36.4

## 2024-03-22 NOTE — PROGRESS NOTE ADULT - SUBJECTIVE AND OBJECTIVE BOX
[facility] Division of Kidney Diseases & Hypertension  FOLLOW UP NOTE  [phone]--------------------------------------------------------------------------------  Chief Complaint:Acute pulmonary edema        24 hour events/subjective:  Pt was seen and examined at the bedside. No acute overnight events. No fresh complaints. Respiratory status has improved. Not on any supplemental oxygen.   Pt denies SOB/ Constipation/ Diarrhea/ Nausea/ Vomiting/ abdominal pain/ chest pain/ tingling/ numbness.         PAST HISTORY  --------------------------------------------------------------------------------  No significant changes to PMH, PSH, FHx, SHx, unless otherwise noted    ALLERGIES & MEDICATIONS  --------------------------------------------------------------------------------  Allergies    No Known Allergies    Intolerances      Standing Inpatient Medications  carvedilol 3.125 milliGRAM(s) Oral every 12 hours  cefTRIAXone   IVPB      cefTRIAXone   IVPB 1000 milliGRAM(s) IV Intermittent every 24 hours  heparin   Injectable 5000 Unit(s) SubCutaneous every 8 hours  ursodiol Capsule 300 milliGRAM(s) Oral two times a day    PRN Inpatient Medications      REVIEW OF SYSTEMS  --------------------------------------------------------------------------------  as above.     VITALS/PHYSICAL EXAM  --------------------------------------------------------------------------------  T(C): 36.5 (03-22-24 @ 11:38), Max: 37.1 (03-21-24 @ 19:34)  HR: 84 (03-22-24 @ 11:38) (78 - 86)  BP: 141/77 (03-22-24 @ 11:38) (122/70 - 141/77)  RR: 18 (03-22-24 @ 11:38) (18 - 18)  SpO2: 96% (03-22-24 @ 11:38) (92% - 96%)  Wt(kg): --        03-21-24 @ 07:01  -  03-22-24 @ 07:00  --------------------------------------------------------  IN: 530 mL / OUT: 0 mL / NET: 530 mL      Physical Exam:    in no apparent distress  Neck: Supple, no JVD,    Lungs: no rhonchi, no wheeze, no crackles  CVS: S1 S2 no M/R/G  Abdomen: no tenderness, no organomegaly, BS present  Neuro: Grossly intact  Skin: warm, dry  Ext: no cyanosis or clubbing, no edema  Access:+ AVF, mild erythema present ( interval improvement)    LABS/STUDIES  --------------------------------------------------------------------------------              12.1   9.95  >-----------<  127      [03-21-24 @ 07:35]              38.8     138  |  97  |  37  ----------------------------<  103      [03-22-24 @ 06:01]  3.9   |  22  |  6.95        Ca     10.3     [03-22-24 @ 06:01]      Mg     2.4     [03-22-24 @ 06:01]      Phos  5.7     [03-22-24 @ 06:01]            Creatinine Trend:  SCr 6.95 [03-22 @ 06:01]  SCr 4.66 [03-21 @ 07:32]  SCr 6.45 [03-20 @ 07:33]  SCr 1.94 [03-19 @ 00:17]  SCr 6.70 [03-18 @ 20:53]    Urinalysis - [03-22-24 @ 06:01]      Color  / Appearance  / SG  / pH       Gluc 103 / Ketone   / Bili  / Urobili        Blood  / Protein  / Leuk Est  / Nitrite       RBC  / WBC  / Hyaline  / Gran  / Sq Epi  / Non Sq Epi  / Bacteria

## 2024-03-22 NOTE — PROGRESS NOTE ADULT - NS ATTEND AMEND GEN_ALL_CORE FT
she looks pretty good:  no sob:  no cough ; no phlegm : she is on room air:  on empiric antibiotics for 5 days:  agree with above
seems OK: no sob:  getting hd: on room air

## 2024-03-23 LAB
CULTURE RESULTS: SIGNIFICANT CHANGE UP
SPECIMEN SOURCE: SIGNIFICANT CHANGE UP

## 2024-03-23 RX ADMIN — URSODIOL 300 MILLIGRAM(S): 250 TABLET, FILM COATED ORAL at 17:09

## 2024-03-23 RX ADMIN — CEFTRIAXONE 100 MILLIGRAM(S): 500 INJECTION, POWDER, FOR SOLUTION INTRAMUSCULAR; INTRAVENOUS at 12:23

## 2024-03-23 RX ADMIN — HEPARIN SODIUM 5000 UNIT(S): 5000 INJECTION INTRAVENOUS; SUBCUTANEOUS at 09:15

## 2024-03-23 RX ADMIN — CARVEDILOL PHOSPHATE 3.12 MILLIGRAM(S): 80 CAPSULE, EXTENDED RELEASE ORAL at 05:53

## 2024-03-23 RX ADMIN — URSODIOL 300 MILLIGRAM(S): 250 TABLET, FILM COATED ORAL at 05:54

## 2024-03-23 RX ADMIN — HEPARIN SODIUM 5000 UNIT(S): 5000 INJECTION INTRAVENOUS; SUBCUTANEOUS at 17:10

## 2024-03-23 RX ADMIN — CARVEDILOL PHOSPHATE 3.12 MILLIGRAM(S): 80 CAPSULE, EXTENDED RELEASE ORAL at 17:14

## 2024-03-24 LAB
ANION GAP SERPL CALC-SCNC: 21 MMOL/L — HIGH (ref 5–17)
BUN SERPL-MCNC: 35 MG/DL — HIGH (ref 7–23)
CALCIUM SERPL-MCNC: 9.8 MG/DL — SIGNIFICANT CHANGE UP (ref 8.4–10.5)
CHLORIDE SERPL-SCNC: 97 MMOL/L — SIGNIFICANT CHANGE UP (ref 96–108)
CO2 SERPL-SCNC: 21 MMOL/L — LOW (ref 22–31)
CREAT SERPL-MCNC: 6.98 MG/DL — HIGH (ref 0.5–1.3)
CULTURE RESULTS: SIGNIFICANT CHANGE UP
EGFR: 6 ML/MIN/1.73M2 — LOW
GLUCOSE SERPL-MCNC: 95 MG/DL — SIGNIFICANT CHANGE UP (ref 70–99)
HCT VFR BLD CALC: 35.7 % — SIGNIFICANT CHANGE UP (ref 34.5–45)
HGB BLD-MCNC: 11.2 G/DL — LOW (ref 11.5–15.5)
MCHC RBC-ENTMCNC: 30.5 PG — SIGNIFICANT CHANGE UP (ref 27–34)
MCHC RBC-ENTMCNC: 31.4 GM/DL — LOW (ref 32–36)
MCV RBC AUTO: 97.3 FL — SIGNIFICANT CHANGE UP (ref 80–100)
NRBC # BLD: 0 /100 WBCS — SIGNIFICANT CHANGE UP (ref 0–0)
PLATELET # BLD AUTO: 134 K/UL — LOW (ref 150–400)
POTASSIUM SERPL-MCNC: 4 MMOL/L — SIGNIFICANT CHANGE UP (ref 3.5–5.3)
POTASSIUM SERPL-SCNC: 4 MMOL/L — SIGNIFICANT CHANGE UP (ref 3.5–5.3)
RBC # BLD: 3.67 M/UL — LOW (ref 3.8–5.2)
RBC # FLD: 20.9 % — HIGH (ref 10.3–14.5)
SODIUM SERPL-SCNC: 139 MMOL/L — SIGNIFICANT CHANGE UP (ref 135–145)
SPECIMEN SOURCE: SIGNIFICANT CHANGE UP
WBC # BLD: 9.96 K/UL — SIGNIFICANT CHANGE UP (ref 3.8–10.5)
WBC # FLD AUTO: 9.96 K/UL — SIGNIFICANT CHANGE UP (ref 3.8–10.5)

## 2024-03-24 RX ADMIN — URSODIOL 300 MILLIGRAM(S): 250 TABLET, FILM COATED ORAL at 05:41

## 2024-03-24 RX ADMIN — URSODIOL 300 MILLIGRAM(S): 250 TABLET, FILM COATED ORAL at 18:24

## 2024-03-24 RX ADMIN — HEPARIN SODIUM 5000 UNIT(S): 5000 INJECTION INTRAVENOUS; SUBCUTANEOUS at 11:39

## 2024-03-24 RX ADMIN — CEFTRIAXONE 100 MILLIGRAM(S): 500 INJECTION, POWDER, FOR SOLUTION INTRAMUSCULAR; INTRAVENOUS at 11:40

## 2024-03-24 RX ADMIN — HEPARIN SODIUM 5000 UNIT(S): 5000 INJECTION INTRAVENOUS; SUBCUTANEOUS at 18:24

## 2024-03-24 RX ADMIN — HEPARIN SODIUM 5000 UNIT(S): 5000 INJECTION INTRAVENOUS; SUBCUTANEOUS at 02:04

## 2024-03-24 RX ADMIN — CARVEDILOL PHOSPHATE 3.12 MILLIGRAM(S): 80 CAPSULE, EXTENDED RELEASE ORAL at 18:24

## 2024-03-24 RX ADMIN — CARVEDILOL PHOSPHATE 3.12 MILLIGRAM(S): 80 CAPSULE, EXTENDED RELEASE ORAL at 05:41

## 2024-03-24 NOTE — PROGRESS NOTE ADULT - SUBJECTIVE AND OBJECTIVE BOX
HPI:  63 year old female with hx of polycythemia, ESRD, on HD M/W/F presenting with shortness of breath.   Patient states she has had progressively worsening shortness of breath over the last 2 weeks. Last HD Friday.   Patient woke up this morning, urinated and subsequently had acute onset shortness of breath.   Patient denies chest pain, vomiting, abdominal pain, fevers, coughing, sick contacts.    In thee d patient had CT chest shows Fluid overload with small bilateral pleural effusions with or without   superimposed infectious/inflammatory process. Partially imaged   multiseptated cystic regions right lower lobe (18 Mar 2024 16:43)    No events     s/p MICU TRANSFER   No events     T(C): 36.8 (03-24-24 @ 20:37), Max: 36.8 (03-24-24 @ 12:09)  HR: 84 (03-24-24 @ 20:37) (82 - 84)  BP: 129/71 (03-24-24 @ 20:37) (128/72 - 133/73)  RR: 18 (03-24-24 @ 20:37) (18 - 18)  SpO2: 96% (03-24-24 @ 20:37) (96% - 99%)      MEDICATIONS  (STANDING):  carvedilol 3.125 milliGRAM(s) Oral every 12 hours  cefTRIAXone   IVPB      cefTRIAXone   IVPB 1000 milliGRAM(s) IV Intermittent every 24 hours  heparin   Injectable 5000 Unit(s) SubCutaneous every 8 hours  ursodiol Capsule 300 milliGRAM(s) Oral two times a day    MEDICATIONS  (PRN):    PAST MEDICAL & SURGICAL HISTORY:  ESRD on dialysis      Anemia secondary to renal failure      Polycythemia          Review of Systems:   CONSTITUTIONAL: No fever, weight loss, or fatigue  EYES: No eye pain, visual disturbances, or discharge  ENMT:  No difficulty hearing, tinnitus, vertigo; No sinus or throat pain  NECK: No pain or stiffness  BREASTS: No pain, masses, or nipple discharge  RESPIRATORY: No cough, wheezing, chills or hemoptysis; No shortness of breath  CARDIOVASCULAR: No chest pain, palpitations, dizziness, or leg swelling  GASTROINTESTINAL: No abdominal or epigastric pain. No nausea, vomiting, or hematemesis; No diarrhea or constipation. No melena or hematochezia.  GENITOURINARY: No dysuria, frequency, hematuria, or incontinence  NEUROLOGICAL: No headaches, memory loss, loss of strength, numbness, or tremors  SKIN: No itching, burning, rashes, or lesions   LYMPH NODES: No enlarged glands  ENDOCRINE: No heat or cold intolerance; No hair loss  MUSCULOSKELETAL: No joint pain or swelling; No muscle, back, or extremity pain  PSYCHIATRIC: No depression, anxiety, mood swings, or difficulty sleeping  HEME/LYMPH: No easy bruising, or bleeding gums  ALLERY AND IMMUNOLOGIC: No hives or eczema    Allergies    No Known Allergies    Intolerances        Social History:     FAMILY HISTORY:          CAPILLARY BLOOD GLUCOSE        I&O's Summary    19 Mar 2024 07:01  -  20 Mar 2024 07:00  --------------------------------------------------------  IN: 820 mL / OUT: 0 mL / NET: 820 mL    20 Mar 2024 07:01  -  20 Mar 2024 17:59  --------------------------------------------------------  IN: 0 mL / OUT: 2000 mL / NET: -2000 mL        PHYSICAL EXAM:  GENERAL: NAD, well-developed  HEAD:  Atraumatic, Normocephalic  EYES: EOMI, PERRLA, conjunctiva and sclera clear  NECK: Supple, No JVD  CHEST/LUNG: Clear to auscultation bilaterally; No wheeze  HEART: Regular rate and rhythm; No murmurs, rubs, or gallops  ABDOMEN: Soft, Nontender, Nondistended; Bowel sounds present  EXTREMITIES:  2+ Peripheral Pulses, No clubbing, cyanosis, or edema  PSYCH: AAOx3  NEUROLOGY: non-focal  SKIN: No rashes or lesions                          12.1   9.95  )-----------( 127      ( 21 Mar 2024 07:35 )             38.8               139|95|23<76  3.7|25|4.66  10.1,2.2,4.0  03-21 @ 07:32          RADIOLOGY & ADDITIONAL TESTS:    Imaging Personally Reviewed:    Consultant(s) Notes Reviewed:      Care Discussed with Consultants/Other Providers:

## 2024-03-24 NOTE — CHART NOTE - NSCHARTNOTEFT_GEN_A_CORE
Informed by primary team that patient is still having diarrhea. Please keep on CLD for possible EGD/colonoscopy on Tuesday.     Nurys Link, PGY6  Gastroenterology/Hepatology Fellow  Available on Microsoft Teams  [phone] ([employer] Short Range Pager)  [phone] (Long Range Pager)    After 5pm, please contact the on-call GI fellow. [phone]

## 2024-03-24 NOTE — PROGRESS NOTE ADULT - SUBJECTIVE AND OBJECTIVE BOX
Date of Service: 03-24-24 @ 14:26    Patient is a 63y old  Female who presents with a chief complaint of SOB x 2 weeks (24 Mar 2024 10:04)      Any change in ROS: seems OK: no sob:  no cough : no phlegm      MEDICATIONS  (STANDING):  carvedilol 3.125 milliGRAM(s) Oral every 12 hours  cefTRIAXone   IVPB      cefTRIAXone   IVPB 1000 milliGRAM(s) IV Intermittent every 24 hours  heparin   Injectable 5000 Unit(s) SubCutaneous every 8 hours  ursodiol Capsule 300 milliGRAM(s) Oral two times a day    MEDICATIONS  (PRN):    Vital Signs Last 24 Hrs  T(C): 36.8 (24 Mar 2024 12:09), Max: 36.8 (24 Mar 2024 12:09)  T(F): 98.3 (24 Mar 2024 12:09), Max: 98.3 (24 Mar 2024 12:09)  HR: 82 (24 Mar 2024 12:09) (82 - 93)  BP: 128/72 (24 Mar 2024 12:09) (113/68 - 132/73)  BP(mean): --  RR: 18 (24 Mar 2024 12:09) (18 - 20)  SpO2: 99% (24 Mar 2024 12:09) (97% - 99%)    Parameters below as of 24 Mar 2024 12:09  Patient On (Oxygen Delivery Method): room air        I&O's Summary    23 Mar 2024 07:01  -  24 Mar 2024 07:00  --------------------------------------------------------  IN: 480 mL / OUT: 0 mL / NET: 480 mL    24 Mar 2024 07:01  -  24 Mar 2024 14:26  --------------------------------------------------------  IN: 220 mL / OUT: 0 mL / NET: 220 mL          Physical Exam:   GENERAL: NAD, well-groomed, well-developed  HEENT: CASEY/   Atraumatic, Normocephalic  ENMT: No tonsillar erythema, exudates, or enlargement; Moist mucous membranes, Good dentition, No lesions  NECK: Supple, No JVD, Normal thyroid  CHEST/LUNG: Clear to auscultaion  CVS: Regular rate and rhythm; No murmurs, rubs, or gallops  GI: : Soft, Nontender, Nondistended; Bowel sounds present  NERVOUS SYSTEM:  Alert & Oriented X3  EXTREMITIES:  2+ Peripheral Pulses, No clubbing, cyanosis, or edema  LYMPH: No lymphadenopathy noted  SKIN: No rashes or lesions  ENDOCRINOLOGY: No Thyromegaly  PSYCH: Appropriate    Labs:  21, 21                            11.2   9.96  )-----------( 134      ( 24 Mar 2024 07:01 )             35.7                         12.1   9.95  )-----------( 127      ( 21 Mar 2024 07:35 )             38.8     03-24    139  |  97  |  35<H>  ----------------------------<  95  4.0   |  21<L>  |  6.98<H>  03-22    138  |  97  |  37<H>  ----------------------------<  103<H>  3.9   |  22  |  6.95<H>  03-21    139  |  95<L>  |  23  ----------------------------<  76  3.7   |  25  |  4.66<H>    Ca    9.8      24 Mar 2024 07:01      CAPILLARY BLOOD GLUCOSE              Urinalysis Basic - ( 24 Mar 2024 07:01 )    Color: x / Appearance: x / SG: x / pH: x  Gluc: 95 mg/dL / Ketone: x  / Bili: x / Urobili: x   Blood: x / Protein: x / Nitrite: x   Leuk Esterase: x / RBC: x / WBC x   Sq Epi: x / Non Sq Epi: x / Bacteria: x            RECENT CULTURES:  03-22 @ 11:26 .Stool Feces     rad< from: Xray Chest 1 View- PORTABLE-Routine (Xray Chest 1 View- PORTABLE-Routine in AM.) (03.21.24 @ 10:31) >    FINDINGS:  Redemonstration of left axillary and left subclavian/brachiocephalic   stents.  Right-sided chest wall catheter with tip terminating in the region of the   superior cavoatrial junction.  Difficult to assess heart size on this AP projection.  Interval improvement in pulmonary edema changes.  No sizable pleural effusion. No pneumothorax.    IMPRESSION:  Interval improvement in pulmonary edema changes.    --- End of Report ---            NORBERT WALLER MD; Attending Radiologist  This document has been electronically signed. Mar 22 2024 10:39AM    < end of copied text >             No Protozoa seen by trichrome stain  No Helminths or Protozoa seen in formalin concentrate  performed by iodine stain  (routine O+P not evaluated for Microsporidia,  Cryptosporidia or Cyclospora)  One negative sample does not necessarily rule  out the presence of a parasitic infection.    03-19 @ 18:19 .Stool Feces                No enteric pathogens isolated.  (Stool culture examined for Salmonella,  Shigella, Campylobacter, Aeromonas, Plesiomonas,  Vibrio, E.coli O157 and Yersinia)    03-19 @ 15:39 .Sputum Sputum       Moderate polymorphonuclear leukocytes per low power field  Moderate Squamous epithelial cells per low power field  Few Gram positive cocci in pairs seen per oil power field  Rare Gram Positive Rods seen per oil power field  Rare Gram Negative Rods seen per oil power field           Normal Respiratory Caroline present    03-18 @ 17:22 .Blood Blood                No growth at 5 days    03-18 @ 10:46 Clean Catch Clean Catch (Midstream)                <10,000 CFU/mL Normal Urogenital Caroline    03-18 @ 10:12 .Blood Blood-Peripheral                No growth at 5 days    03-18 @ 09:50 .Blood Blood-Peripheral                No growth at 5 days          RESPIRATORY CULTURES:          Studies  Chest X-RAY  CT SCAN Chest   Venous Dopplers: LE:   CT Abdomen  Others

## 2024-03-24 NOTE — PROGRESS NOTE ADULT - SUBJECTIVE AND OBJECTIVE BOX
SURGERY DAILY PROGRESS NOTE:       SUBJECTIVE/ROS: Patient seen and evaluated on AM rounds.   Denies nausea, vomiting, chest pain, shortness of breath       OBJECTIVE:  Vital Signs Last 24 Hrs  T(C): 36.6 (24 Mar 2024 05:34), Max: 36.7 (23 Mar 2024 20:39)  T(F): 97.9 (24 Mar 2024 05:34), Max: 98.1 (23 Mar 2024 20:39)  HR: 83 (24 Mar 2024 05:34) (83 - 93)  BP: 132/73 (24 Mar 2024 05:34) (106/66 - 132/73)  BP(mean): --  RR: 18 (24 Mar 2024 05:34) (18 - 20)  SpO2: 97% (24 Mar 2024 05:34) (97% - 98%)    Parameters below as of 24 Mar 2024 05:34  Patient On (Oxygen Delivery Method): room air      I&O's Detail    23 Mar 2024 07:01  -  24 Mar 2024 07:00  --------------------------------------------------------  IN:    Oral Fluid: 480 mL  Total IN: 480 mL    OUT:  Total OUT: 0 mL    Total NET: 480 mL        Daily     Daily   MEDICATIONS  (STANDING):  carvedilol 3.125 milliGRAM(s) Oral every 12 hours  cefTRIAXone   IVPB      cefTRIAXone   IVPB 1000 milliGRAM(s) IV Intermittent every 24 hours  heparin   Injectable 5000 Unit(s) SubCutaneous every 8 hours  ursodiol Capsule 300 milliGRAM(s) Oral two times a day    MEDICATIONS  (PRN):      LABS:                        11.2   9.96  )-----------( 134      ( 24 Mar 2024 07:01 )             35.7     03-24    139  |  97  |  35<H>  ----------------------------<  95  4.0   |  21<L>  |  6.98<H>    Ca    9.8      24 Mar 2024 07:01      PHYSICAL EXAM:  General: No acute distress  Respiratory: Nonlabored  Cardiovascular: RRR  Abdominal: Soft, nondistended, nontender. No rebound or guarding. No organomegaly, no palpable mass.  Extremities: Warm  Vascular: LUE AVF noted with aneurysmal changes and palpable soft thrill. Overlying punctate epidermal ulceration noted without dermal violation. Distal perfusion intact. Radial and ulnar pulse on L wrist palpable and L hand sensorimotor intact + warm.

## 2024-03-24 NOTE — PROGRESS NOTE ADULT - ASSESSMENT
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 Vascular consulted to evaluate erythema and aneurysmal changes of fistula. Per pt changes have been present for 2 years, however she has needed longer pressure held over fistula for last several months.     RECOMMENDATIONS:   - Outpatient follow up with Dr. Giorgio Dempsey  - Continue to use fistula for HD, cannulate away from aneurysms  - Dispo per primary team    Discussed with surgical fellow on behalf of Dr. Dempsey    Vascular Surgery   [phone]

## 2024-03-25 ENCOUNTER — OUTPATIENT (OUTPATIENT)
Dept: OUTPATIENT SERVICES | Facility: HOSPITAL | Age: 64
LOS: 1 days | Discharge: HOME | End: 2024-03-25

## 2024-03-25 DIAGNOSIS — Z99.2 DEPENDENCE ON RENAL DIALYSIS: Chronic | ICD-10-CM

## 2024-03-25 DIAGNOSIS — N18.6 END STAGE RENAL DISEASE: ICD-10-CM

## 2024-03-25 DIAGNOSIS — T85.898A OTHER SPECIFIED COMPLICATION OF OTHER INTERNAL PROSTHETIC DEVICES, IMPLANTS AND GRAFTS, INITIAL ENCOUNTER: Chronic | ICD-10-CM

## 2024-03-25 DIAGNOSIS — D64.9 ANEMIA, UNSPECIFIED: ICD-10-CM

## 2024-03-25 DIAGNOSIS — I77.0 ARTERIOVENOUS FISTULA, ACQUIRED: Chronic | ICD-10-CM

## 2024-03-25 LAB
ANION GAP SERPL CALC-SCNC: 23 MMOL/L — HIGH (ref 5–17)
BUN SERPL-MCNC: 51 MG/DL — HIGH (ref 7–23)
CALCIUM SERPL-MCNC: 9.4 MG/DL — SIGNIFICANT CHANGE UP (ref 8.4–10.5)
CHLORIDE SERPL-SCNC: 95 MMOL/L — LOW (ref 96–108)
CO2 SERPL-SCNC: 17 MMOL/L — LOW (ref 22–31)
CREAT SERPL-MCNC: 8.45 MG/DL — HIGH (ref 0.5–1.3)
EGFR: 5 ML/MIN/1.73M2 — LOW
GLUCOSE SERPL-MCNC: 100 MG/DL — HIGH (ref 70–99)
HBV SURFACE AB SER-ACNC: >1000 MIU/ML — SIGNIFICANT CHANGE UP
HBV SURFACE AG SER-ACNC: SIGNIFICANT CHANGE UP
HCT VFR BLD CALC: 35 % — SIGNIFICANT CHANGE UP (ref 34.5–45)
HCV AB S/CO SERPL IA: 0.21 S/CO — SIGNIFICANT CHANGE UP (ref 0–0.99)
HCV AB SERPL-IMP: SIGNIFICANT CHANGE UP
HGB BLD-MCNC: 10.6 G/DL — LOW (ref 11.5–15.5)
MCHC RBC-ENTMCNC: 30.3 GM/DL — LOW (ref 32–36)
MCHC RBC-ENTMCNC: 30.5 PG — SIGNIFICANT CHANGE UP (ref 27–34)
MCV RBC AUTO: 100.6 FL — HIGH (ref 80–100)
NRBC # BLD: 0 /100 WBCS — SIGNIFICANT CHANGE UP (ref 0–0)
PLATELET # BLD AUTO: 127 K/UL — LOW (ref 150–400)
POTASSIUM SERPL-MCNC: 4.1 MMOL/L — SIGNIFICANT CHANGE UP (ref 3.5–5.3)
POTASSIUM SERPL-SCNC: 4.1 MMOL/L — SIGNIFICANT CHANGE UP (ref 3.5–5.3)
RBC # BLD: 3.48 M/UL — LOW (ref 3.8–5.2)
RBC # FLD: 20.8 % — HIGH (ref 10.3–14.5)
SODIUM SERPL-SCNC: 135 MMOL/L — SIGNIFICANT CHANGE UP (ref 135–145)
WBC # BLD: 9.11 K/UL — SIGNIFICANT CHANGE UP (ref 3.8–10.5)
WBC # FLD AUTO: 9.11 K/UL — SIGNIFICANT CHANGE UP (ref 3.8–10.5)

## 2024-03-25 PROCEDURE — 90935 HEMODIALYSIS ONE EVALUATION: CPT | Mod: GC

## 2024-03-25 PROCEDURE — 99232 SBSQ HOSP IP/OBS MODERATE 35: CPT | Mod: GC

## 2024-03-25 RX ORDER — SOD SULF/SODIUM/NAHCO3/KCL/PEG
4000 SOLUTION, RECONSTITUTED, ORAL ORAL ONCE
Refills: 0 | Status: COMPLETED | OUTPATIENT
Start: 2024-03-25 | End: 2024-03-25

## 2024-03-25 RX ADMIN — HEPARIN SODIUM 5000 UNIT(S): 5000 INJECTION INTRAVENOUS; SUBCUTANEOUS at 05:26

## 2024-03-25 RX ADMIN — CARVEDILOL PHOSPHATE 3.12 MILLIGRAM(S): 80 CAPSULE, EXTENDED RELEASE ORAL at 05:26

## 2024-03-25 RX ADMIN — Medication 4000 MILLILITER(S): at 20:40

## 2024-03-25 RX ADMIN — URSODIOL 300 MILLIGRAM(S): 250 TABLET, FILM COATED ORAL at 05:26

## 2024-03-25 RX ADMIN — HEPARIN SODIUM 5000 UNIT(S): 5000 INJECTION INTRAVENOUS; SUBCUTANEOUS at 17:04

## 2024-03-25 RX ADMIN — CEFTRIAXONE 100 MILLIGRAM(S): 500 INJECTION, POWDER, FOR SOLUTION INTRAMUSCULAR; INTRAVENOUS at 15:19

## 2024-03-25 RX ADMIN — URSODIOL 300 MILLIGRAM(S): 250 TABLET, FILM COATED ORAL at 17:04

## 2024-03-25 NOTE — CHART NOTE - NSCHARTNOTEFT_GEN_A_CORE
Patient's chart reviewed. Continue to hold MF meds, has follow-up with hematologist Dr. Guo at [facility] tomorrow. If patient will be here through tomorrow, will need to reschedule outpatient appointment. Please recall hematology with questions or concerns.     Nile Hobson MD, PGY-5  Hematology/Medical Oncology Fellow  Pager: [phone]  Available on Microsoft Teams  After 5pm or on weekends please contact  to page on-call fellow

## 2024-03-25 NOTE — PROGRESS NOTE ADULT - SUBJECTIVE AND OBJECTIVE BOX
[facility] Division of Kidney Diseases & Hypertension  FOLLOW UP NOTE  [phone]--------------------------------------------------------------------------------  Chief Complaint:Acute pulmonary edema        24 hour events/subjective:  Pt was seen and examined at the bedside during dialysis. No acute overnight events. No fresh complaints except for diarrhea. Plan for colonoscopy today.   Pt denies SOB/ Constipation/  Nausea/ Vomiting/ abdominal pain/ chest pain/ tingling/ numbness.         PAST HISTORY  --------------------------------------------------------------------------------  No significant changes to PMH, PSH, FHx, SHx, unless otherwise noted    ALLERGIES & MEDICATIONS  --------------------------------------------------------------------------------  Allergies    No Known Allergies    Intolerances      Standing Inpatient Medications  carvedilol 3.125 milliGRAM(s) Oral every 12 hours  cefTRIAXone   IVPB      cefTRIAXone   IVPB 1000 milliGRAM(s) IV Intermittent every 24 hours  heparin   Injectable 5000 Unit(s) SubCutaneous every 8 hours  ursodiol Capsule 300 milliGRAM(s) Oral two times a day    PRN Inpatient Medications      REVIEW OF SYSTEMS  --------------------------------------------------------------------------------  as above.     VITALS/PHYSICAL EXAM  --------------------------------------------------------------------------------  T(C): 36.3 (03-25-24 @ 13:00), Max: 36.8 (03-24-24 @ 20:37)  HR: 87 (03-25-24 @ 13:00) (83 - 87)  BP: 132/73 (03-25-24 @ 13:00) (128/73 - 133/73)  RR: 18 (03-25-24 @ 13:00) (18 - 18)  SpO2: 98% (03-25-24 @ 13:00) (96% - 98%)  Wt(kg): --        03-24-24 @ 07:01  -  03-25-24 @ 07:00  --------------------------------------------------------  IN: 710 mL / OUT: 0 mL / NET: 710 mL      Physical Exam:  in no apparent distress  Neck: Supple, no JVD,    Lungs: no rhonchi, no wheeze, no crackles  CVS: S1 S2 no M/R/G  Abdomen: no organomegaly, BS present. Mild tenderness of the abdomen on deep palpation.   Neuro: Grossly intact  Skin: warm, dry  Ext: no cyanosis or clubbing, no edema  Access:+ AVF, mild erythema present ( interval improvement)      LABS/STUDIES  --------------------------------------------------------------------------------              10.6   9.11  >-----------<  127      [03-25-24 @ 07:20]              35.0     135  |  95  |  51  ----------------------------<  100      [03-25-24 @ 07:21]  4.1   |  17  |  8.45        Ca     9.4     [03-25-24 @ 07:21]            Creatinine Trend:  SCr 8.45 [03-25 @ 07:21]  SCr 6.98 [03-24 @ 07:01]  SCr 6.95 [03-22 @ 06:01]  SCr 4.66 [03-21 @ 07:32]  SCr 6.45 [03-20 @ 07:33]    Urinalysis - [03-25-24 @ 07:21]      Color  / Appearance  / SG  / pH       Gluc 100 / Ketone   / Bili  / Urobili        Blood  / Protein  / Leuk Est  / Nitrite       RBC  / WBC  / Hyaline  / Gran  / Sq Epi  / Non Sq Epi  / Bacteria         HBsAg Nonreact      [03-25-24 @ 10:22]

## 2024-03-25 NOTE — CHART NOTE - NSCHARTNOTESELECT_GEN_ALL_CORE
GI/Event Note
Hematology/Event Note
MAR Accept Note/Event Note
MICU accept/Event Note
POCUS/Event Note
MICU Transfer/Event Note

## 2024-03-25 NOTE — PROGRESS NOTE ADULT - SUBJECTIVE AND OBJECTIVE BOX
Interval Events:   No acute events  Patient reports ongoing diarrhea with four loose brown BMs yesterday and fecal urgency. Denies abd pain, N/V.  No SOB or chest pain. Oxygenating adequately on ambient air.   Abd soft, NT. Normal respiratory effort  Scheduled for dialysis today    Hospital Medications:  carvedilol 3.125 milliGRAM(s) Oral every 12 hours  cefTRIAXone   IVPB      cefTRIAXone   IVPB 1000 milliGRAM(s) IV Intermittent every 24 hours  heparin   Injectable 5000 Unit(s) SubCutaneous every 8 hours  ursodiol Capsule 300 milliGRAM(s) Oral two times a day    ROS: See above.    PHYSICAL EXAM:   Vital Signs:  Vital Signs Last 24 Hrs  T(C): 36.8 (24 Mar 2024 20:37), Max: 36.8 (24 Mar 2024 12:09)  T(F): 98.2 (24 Mar 2024 20:37), Max: 98.3 (24 Mar 2024 12:09)  HR: 84 (24 Mar 2024 20:37) (82 - 84)  BP: 129/71 (24 Mar 2024 20:37) (128/72 - 133/73)  BP(mean): --  RR: 18 (24 Mar 2024 20:37) (18 - 18)  SpO2: 96% (24 Mar 2024 20:37) (96% - 99%)    Parameters below as of 24 Mar 2024 20:37  Patient On (Oxygen Delivery Method): room air      Daily     Daily Weight in k.6 (24 Mar 2024 18:16)    GENERAL:  NAD, resting comfortably in bed  HEENT:  sclera anicteric  CHEST:  Normal Effort  HEART:  HDS  ABDOMEN:  Soft, non-tender, non-distended  SKIN:  Warm & Dry. No jaundice  NEURO:  Alert, conversant, no focal deficit    LABS:                        10.6   9.11  )-----------( 127      ( 25 Mar 2024 07:20 )             35.0     Mean Cell Volume: 100.6 fl (- @ 07:20)        135  |  95<L>  |  51<H>  ----------------------------<  100<H>  4.1   |  17<L>  |  8.45<H>    Ca    9.4      25 Mar 2024 07:21      Urinalysis Basic - ( 25 Mar 2024 07:21 )    Color: x / Appearance: x / SG: x / pH: x  Gluc: 100 mg/dL / Ketone: x  / Bili: x / Urobili: x   Blood: x / Protein: x / Nitrite: x   Leuk Esterase: x / RBC: x / WBC x   Sq Epi: x / Non Sq Epi: x / Bacteria: x                              10.6   9.11  )-----------( 127      ( 25 Mar 2024 07:20 )             35.0                         11.2   9.96  )-----------( 134      ( 24 Mar 2024 07:01 )             35.7

## 2024-03-25 NOTE — PROGRESS NOTE ADULT - ASSESSMENT
63F with hx of polycythemia c/b myelofibrosis (on Ojjaara, hydrea), ESRD (M/W/F) presenting with dyspnea in setting og volume overload and PNA; course further notable for diarrhea      #chronic diarrhea; ddx include medication induced (Ojjaara given chronicity with initiation of this) vs. less likely malignancy vs. less likely infectious or inflammatory including IBD or microscopic colitis  - 3 months hx, 4-5x per day; with some improvement after decreasing Ojjaara dosage; no change in symptoms with cessation of milk products  - Last colonoscopy 2017 with polyps; told to repeat in 5-7 years  - Ojjaara for her myelofibrosis in 12/2023 after which she noted her diarrhea; associated with diarrhea in 20-22% of patients; when her dose was de-escalated, she noted some improvement in the texture of her stools  - GI PCR negative    #NRH with noncirrhotic portal hypertension   #Gastric varices, IGV1   - EGD 2022 with IGV1; on medical management with carvedilol; overdue to repeat surveillance    Recommendations  - plan for EGD for variceal screening and colonoscopy for diarrhea evaluation Tuesday  - CLD , NPO after midnight, 4AM lab collection, hold AM heparin   - F/u stool studies (electrolytes, pH, fecal elastase), fecal calprotectin  - Send stool C difficile PCR today   - Remainder per primary     Jose Luis Us MD  Gastroenterology/Hepatology Fellow

## 2024-03-25 NOTE — PROGRESS NOTE ADULT - SUBJECTIVE AND OBJECTIVE BOX
Date of Service: 03-25-24 @ 16:11    Patient is a 63y old  Female who presents with a chief complaint of SOB x 2 weeks (25 Mar 2024 13:36)      Any change in ROS: Sheis doing pretty good:  no sob:  no cough : no phlegm      MEDICATIONS  (STANDING):  carvedilol 3.125 milliGRAM(s) Oral every 12 hours  cefTRIAXone   IVPB      cefTRIAXone   IVPB 1000 milliGRAM(s) IV Intermittent every 24 hours  heparin   Injectable 5000 Unit(s) SubCutaneous every 8 hours  polyethylene glycol/electrolyte Solution. 4000 milliLiter(s) Oral once  ursodiol Capsule 300 milliGRAM(s) Oral two times a day    MEDICATIONS  (PRN):    Vital Signs Last 24 Hrs  T(C): 36.3 (25 Mar 2024 13:00), Max: 36.8 (24 Mar 2024 20:37)  T(F): 97.3 (25 Mar 2024 13:00), Max: 98.2 (24 Mar 2024 20:37)  HR: 87 (25 Mar 2024 13:00) (83 - 87)  BP: 132/73 (25 Mar 2024 13:00) (128/73 - 133/73)  BP(mean): --  RR: 18 (25 Mar 2024 13:00) (18 - 18)  SpO2: 98% (25 Mar 2024 13:00) (96% - 98%)    Parameters below as of 25 Mar 2024 13:00  Patient On (Oxygen Delivery Method): room air        I&O's Summary    24 Mar 2024 07:01  -  25 Mar 2024 07:00  --------------------------------------------------------  IN: 710 mL / OUT: 0 mL / NET: 710 mL          Physical Exam:   GENERAL: NAD, well-groomed, well-developed  HEENT: CASEY/   Atraumatic, Normocephalic  ENMT: No tonsillar erythema, exudates, or enlargement; Moist mucous membranes, Good dentition, No lesions  NECK: Supple, No JVD, Normal thyroid  CHEST/LUNG: Clear to auscultaion  CVS: Regular rate and rhythm; No murmurs, rubs, or gallops  GI: : Soft, Nontender, Nondistended; Bowel sounds present  NERVOUS SYSTEM:  Alert & Oriented X3  EXTREMITIES:  2+ Peripheral Pulses, No clubbing, cyanosis, or edema  LYMPH: No lymphadenopathy noted  SKIN: No rashes or lesions  ENDOCRINOLOGY: No Thyromegaly  PSYCH: Appropriate    Labs:  21                            10.6   9.11  )-----------( 127      ( 25 Mar 2024 07:20 )             35.0                         11.2   9.96  )-----------( 134      ( 24 Mar 2024 07:01 )             35.7     03-25    135  |  95<L>  |  51<H>  ----------------------------<  100<H>  4.1   |  17<L>  |  8.45<H>  03-24    139  |  97  |  35<H>  ----------------------------<  95  4.0   |  21<L>  |  6.98<H>  03-22    138  |  97  |  37<H>  ----------------------------<  103<H>  3.9   |  22  |  6.95<H>    Ca    9.4      25 Mar 2024 07:21  Ca    9.8      24 Mar 2024 07:01      CAPILLARY BLOOD GLUCOSE              Urinalysis Basic - ( 25 Mar 2024 07:21 )    Color: x / Appearance: x / SG: x / pH: x  Gluc: 100 mg/dL / Ketone: x  / Bili: x / Urobili: x   Blood: x / Protein: x / Nitrite: x   Leuk Esterase: x / RBC: x / WBC x   Sq Epi: x / Non Sq Epi: x / Bacteria: x            RECENT CULTURES:  03-22 @ 11:26 .Stool Feces       rad< from: Xray Chest 1 View- PORTABLE-Routine (Xray Chest 1 View- PORTABLE-Routine in AM.) (03.21.24 @ 10:31) >    ACC: [id number] EXAM:  XR CHEST PORTABLE ROUTINE 1V   ORDERED BY: EDI BAIN     PROCEDURE DATE:  03/21/2024          INTERPRETATION:  DATE OF STUDY: 3/21/24    PRIOR: 3/18/24    CLINICAL INDICATION: SOB    TECHNIQUE: AP radiograph of the chest.    FINDINGS:  Redemonstration of left axillary and left subclavian/brachiocephalic   stents.  Right-sided chest wall catheter with tip terminating in the region of the   superior cavoatrial junction.  Difficult to assess heart size on this AP projection.  Interval improvement in pulmonary edema changes.  No sizable pleural effusion. No pneumothorax.    IMPRESSION:  Interval improvement in pulmonary edema changes.    --- End of Report ---            NORBERT WALLER MD; Attending Radiologist  This document has been electronically signed. Mar 22 2024 10:39AM    < end of copied text >           No Protozoa seen by trichrome stain  No Helminths or Protozoa seen in formalin concentrate  performed by iodine stain  (routine O+P not evaluated for Microsporidia,  Cryptosporidia or Cyclospora)  One negative sample does not necessarily rule  out the presence of a parasitic infection.    03-19 @ 18:19 .Stool Feces                No enteric pathogens isolated.  (Stool culture examined for Salmonella,  Shigella, Campylobacter, Aeromonas, Plesiomonas,  Vibrio, E.coli O157 and Yersinia)    03-19 @ 15:39 .Sputum Sputum       Moderate polymorphonuclear leukocytes per low power field  Moderate Squamous epithelial cells per low power field  Few Gram positive cocci in pairs seen per oil power field  Rare Gram Positive Rods seen per oil power field  Rare Gram Negative Rods seen per oil power field           Normal Respiratory Caroline present    03-18 @ 17:22 .Blood Blood                No growth at 5 days          RESPIRATORY CULTURES:          Studies  Chest X-RAY  CT SCAN Chest   Venous Dopplers: LE:   CT Abdomen  Others

## 2024-03-25 NOTE — PROGRESS NOTE ADULT - ASSESSMENT
EGD/Colonoscopy tomorrow   AVF Dopplers   Vascular folloing       Acute Hypoxic respiratory Failure secondary to Pneumonia   Sepsis present on admission   Pneumonia   Anemia   Myelofibrosis   AVF erythema   Vassc eval     s/p MICU stay     CT chest shows Fluid overload with small bilateral pleural effusions with or without   superimposed infectious/inflammatory process. Partially imaged   multiseptated cystic regions right lower lobe  Leukocytosis  HD as per renal   Hme following   Hold Hydroxyurea     Diarrhea resolved   GI consulted     plan of care discussed with patient at length

## 2024-03-25 NOTE — PROGRESS NOTE ADULT - SUBJECTIVE AND OBJECTIVE BOX
Patient is a 63y old  Female who presents with a chief complaint of SOB x 2 weeks (25 Mar 2024 16:11)      SUBJECTIVE / OVERNIGHT EVENTS: no events    MEDICATIONS  (STANDING):  carvedilol 3.125 milliGRAM(s) Oral every 12 hours  heparin   Injectable 5000 Unit(s) SubCutaneous every 8 hours  ursodiol Capsule 300 milliGRAM(s) Oral two times a day    MEDICATIONS  (PRN):      CAPILLARY BLOOD GLUCOSE        I&O's Summary    24 Mar 2024 07:01  -  25 Mar 2024 07:00  --------------------------------------------------------  IN: 710 mL / OUT: 0 mL / NET: 710 mL    25 Mar 2024 07:01  -  25 Mar 2024 22:29  --------------------------------------------------------  IN: 240 mL / OUT: 0 mL / NET: 240 mL      T(C): 36.9 (03-25-24 @ 19:25), Max: 36.9 (03-25-24 @ 19:25)  HR: 76 (03-25-24 @ 19:25) (76 - 88)  BP: 113/63 (03-25-24 @ 19:25) (108/69 - 132/73)  RR: 18 (03-25-24 @ 19:25) (18 - 18)  SpO2: 99% (03-25-24 @ 19:25) (97% - 99%)    PHYSICAL EXAM:  GENERAL: NAD, well-developed  HEAD:  Atraumatic, Normocephalic  EYES: EOMI, PERRLA, conjunctiva and sclera clear  NECK: Supple, No JVD  CHEST/LUNG: Clear to auscultation bilaterally; No wheeze  HEART: Regular rate and rhythm; No murmurs, rubs, or gallops  ABDOMEN: Soft, Nontender, Nondistended; Bowel sounds present  EXTREMITIES:  2+ Peripheral Pulses, No clubbing, cyanosis, or edema  PSYCH: AAOx3  NEUROLOGY: non-focal  SKIN: No rashes or lesions    LABS:                        10.6   9.11  )-----------( 127      ( 25 Mar 2024 07:20 )             35.0     03-25    135  |  95<L>  |  51<H>  ----------------------------<  100<H>  4.1   |  17<L>  |  8.45<H>    Ca    9.4      25 Mar 2024 07:21            Urinalysis Basic - ( 25 Mar 2024 07:21 )    Color: x / Appearance: x / SG: x / pH: x  Gluc: 100 mg/dL / Ketone: x  / Bili: x / Urobili: x   Blood: x / Protein: x / Nitrite: x   Leuk Esterase: x / RBC: x / WBC x   Sq Epi: x / Non Sq Epi: x / Bacteria: x        RADIOLOGY & ADDITIONAL TESTS:    Imaging Personally Reviewed:    Consultant(s) Notes Reviewed:      Care Discussed with Consultants/Other Providers:

## 2024-03-26 ENCOUNTER — TRANSCRIPTION ENCOUNTER (OUTPATIENT)
Age: 64
End: 2024-03-26

## 2024-03-26 ENCOUNTER — APPOINTMENT (OUTPATIENT)
Dept: HEMATOLOGY ONCOLOGY | Facility: CLINIC | Age: 64
End: 2024-03-26

## 2024-03-26 ENCOUNTER — RESULT REVIEW (OUTPATIENT)
Age: 64
End: 2024-03-26

## 2024-03-26 DIAGNOSIS — K52.9 NONINFECTIVE GASTROENTERITIS AND COLITIS, UNSPECIFIED: ICD-10-CM

## 2024-03-26 LAB
ANION GAP SERPL CALC-SCNC: 20 MMOL/L — HIGH (ref 5–17)
APTT BLD: 38 SEC — HIGH (ref 24.5–35.6)
BUN SERPL-MCNC: 22 MG/DL — SIGNIFICANT CHANGE UP (ref 7–23)
CALCIUM SERPL-MCNC: 10.2 MG/DL — SIGNIFICANT CHANGE UP (ref 8.4–10.5)
CHLORIDE SERPL-SCNC: 94 MMOL/L — LOW (ref 96–108)
CO2 SERPL-SCNC: 22 MMOL/L — SIGNIFICANT CHANGE UP (ref 22–31)
CREAT SERPL-MCNC: 5.4 MG/DL — HIGH (ref 0.5–1.3)
EGFR: 8 ML/MIN/1.73M2 — LOW
ELASTASE PANC STL-MCNT: 329 CD:794062645 — SIGNIFICANT CHANGE UP
GLUCOSE SERPL-MCNC: 107 MG/DL — HIGH (ref 70–99)
HCT VFR BLD CALC: 35.8 % — SIGNIFICANT CHANGE UP (ref 34.5–45)
HGB BLD-MCNC: 11 G/DL — LOW (ref 11.5–15.5)
INR BLD: 1.14 RATIO — SIGNIFICANT CHANGE UP (ref 0.85–1.18)
MCHC RBC-ENTMCNC: 30 PG — SIGNIFICANT CHANGE UP (ref 27–34)
MCHC RBC-ENTMCNC: 30.7 GM/DL — LOW (ref 32–36)
MCV RBC AUTO: 97.5 FL — SIGNIFICANT CHANGE UP (ref 80–100)
NRBC # BLD: 0 /100 WBCS — SIGNIFICANT CHANGE UP (ref 0–0)
PLATELET # BLD AUTO: 148 K/UL — LOW (ref 150–400)
POTASSIUM SERPL-MCNC: 4.4 MMOL/L — SIGNIFICANT CHANGE UP (ref 3.5–5.3)
POTASSIUM SERPL-SCNC: 4.4 MMOL/L — SIGNIFICANT CHANGE UP (ref 3.5–5.3)
PROTHROM AB SERPL-ACNC: 11.9 SEC — SIGNIFICANT CHANGE UP (ref 9.5–13)
RBC # BLD: 3.67 M/UL — LOW (ref 3.8–5.2)
RBC # FLD: 21 % — HIGH (ref 10.3–14.5)
SODIUM SERPL-SCNC: 136 MMOL/L — SIGNIFICANT CHANGE UP (ref 135–145)
WBC # BLD: 10.63 K/UL — HIGH (ref 3.8–10.5)
WBC # FLD AUTO: 10.63 K/UL — HIGH (ref 3.8–10.5)

## 2024-03-26 PROCEDURE — 45385 COLONOSCOPY W/LESION REMOVAL: CPT | Mod: GC

## 2024-03-26 PROCEDURE — 43235 EGD DIAGNOSTIC BRUSH WASH: CPT | Mod: GC

## 2024-03-26 PROCEDURE — 88305 TISSUE EXAM BY PATHOLOGIST: CPT | Mod: 26

## 2024-03-26 DEVICE — NET RETRV ROT ROTH 2.5MMX230CM: Type: IMPLANTABLE DEVICE | Status: FUNCTIONAL

## 2024-03-26 RX ORDER — CHLORHEXIDINE GLUCONATE 213 G/1000ML
1 SOLUTION TOPICAL
Refills: 0 | Status: DISCONTINUED | OUTPATIENT
Start: 2024-03-26 | End: 2024-03-27

## 2024-03-26 RX ORDER — SODIUM CHLORIDE 9 MG/ML
500 INJECTION INTRAMUSCULAR; INTRAVENOUS; SUBCUTANEOUS
Refills: 0 | Status: DISCONTINUED | OUTPATIENT
Start: 2024-03-26 | End: 2024-03-27

## 2024-03-26 RX ADMIN — URSODIOL 300 MILLIGRAM(S): 250 TABLET, FILM COATED ORAL at 17:24

## 2024-03-26 RX ADMIN — HEPARIN SODIUM 5000 UNIT(S): 5000 INJECTION INTRAVENOUS; SUBCUTANEOUS at 02:58

## 2024-03-26 RX ADMIN — CARVEDILOL PHOSPHATE 3.12 MILLIGRAM(S): 80 CAPSULE, EXTENDED RELEASE ORAL at 06:31

## 2024-03-26 RX ADMIN — URSODIOL 300 MILLIGRAM(S): 250 TABLET, FILM COATED ORAL at 06:31

## 2024-03-26 NOTE — PROGRESS NOTE ADULT - ASSESSMENT
Colonoscopy am tomorrow     Acute Hypoxic repsiratory Failure       CT chest shows Fluid overload with small bilateral pleural effusions with or without   superimposed infectious/inflammatory process. Partially imaged   multiseptated cystic regions right lower lobe  Leukocytosis       From Fluid Over load /Pneumonia  in the setting of ESRD   Echo tele   Cards eval called     Pneumonia IV abx   Follow up cx     Polycythemia Continue with Hydroxyurea       Addendum Patient noted to be in respiratory distress, placed on Biapa, Nitro drip IN THE ed, transferred level of care to MICU for emergent HD.

## 2024-03-26 NOTE — DISCHARGE NOTE NURSING/CASE MANAGEMENT/SOCIAL WORK - NSDCPEFALRISK_GEN_ALL_CORE
For information on Fall & Injury Prevention, visit: [email or web address] OR  [email or web address] OR  https://www.cdc.gov/steadi/patient.html

## 2024-03-26 NOTE — PROGRESS NOTE ADULT - SUBJECTIVE AND OBJECTIVE BOX
Date of Service: 03-26-24 @ 16:55    Patient is a 63y old  Female who presents with a chief complaint of SOB x 2 weeks (25 Mar 2024 22:29)      Any change in ROS: Seems OK:  no sob:  no cough : no phlegm      MEDICATIONS  (STANDING):  carvedilol 3.125 milliGRAM(s) Oral every 12 hours  chlorhexidine 2% Cloths 1 Application(s) Topical <User Schedule>  heparin   Injectable 5000 Unit(s) SubCutaneous every 8 hours  sodium chloride 0.9%. 500 milliLiter(s) (30 mL/Hr) IV Continuous <Continuous>  ursodiol Capsule 300 milliGRAM(s) Oral two times a day    MEDICATIONS  (PRN):    Vital Signs Last 24 Hrs  T(C): 36.6 (26 Mar 2024 16:15), Max: 36.9 (25 Mar 2024 19:25)  T(F): 97.8 (26 Mar 2024 16:15), Max: 98.4 (25 Mar 2024 19:25)  HR: 74 (26 Mar 2024 16:15) (69 - 88)  BP: 109/64 (26 Mar 2024 16:15) (94/53 - 127/72)  BP(mean): 83 (26 Mar 2024 10:32) (83 - 83)  RR: 18 (26 Mar 2024 16:15) (17 - 22)  SpO2: 96% (26 Mar 2024 16:15) (96% - 100%)    Parameters below as of 26 Mar 2024 16:15  Patient On (Oxygen Delivery Method): room air        I&O's Summary    25 Mar 2024 07:01  -  26 Mar 2024 07:00  --------------------------------------------------------  IN: 240 mL / OUT: 0 mL / NET: 240 mL          Physical Exam:   GENERAL: NAD, well-groomed, well-developed  HEENT: CASEY/   Atraumatic, Normocephalic  ENMT: No tonsillar erythema, exudates, or enlargement; Moist mucous membranes, Good dentition, No lesions  NECK: Supple, No JVD, Normal thyroid  CHEST/LUNG: Clear to auscultaion  CVS: Regular rate and rhythm; No murmurs, rubs, or gallops  GI: : Soft, Nontender, Nondistended; Bowel sounds present  NERVOUS SYSTEM:  Alert & Oriented X3  EXTREMITIES:  - edema  LYMPH: No lymphadenopathy noted  SKIN: No rashes or lesions  ENDOCRINOLOGY: No Thyromegaly  PSYCH: Appropriate    Labs:                              11.0   10.63 )-----------( 148      ( 26 Mar 2024 07:59 )             35.8                         10.6   9.11  )-----------( 127      ( 25 Mar 2024 07:20 )             35.0                         11.2   9.96  )-----------( 134      ( 24 Mar 2024 07:01 )             35.7     03-26    136  |  94<L>  |  22  ----------------------------<  107<H>  4.4   |  22  |  5.40<H>  03-25    135  |  95<L>  |  51<H>  ----------------------------<  100<H>  4.1   |  17<L>  |  8.45<H>  03-24    139  |  97  |  35<H>  ----------------------------<  95  4.0   |  21<L>  |  6.98<H>    Ca    10.2      26 Mar 2024 07:59  Ca    9.4      25 Mar 2024 07:21      CAPILLARY BLOOD GLUCOSE            PT/INR - ( 26 Mar 2024 07:59 )   PT: 11.9 sec;   INR: 1.14 ratio         PTT - ( 26 Mar 2024 07:59 )  PTT:38.0 sec  Urinalysis Basic - ( 26 Mar 2024 07:59 )    Color: x / Appearance: x / SG: x / pH: x  Gluc: 107 mg/dL / Ketone: x  / Bili: x / Urobili: x   Blood: x / Protein: x / Nitrite: x   Leuk Esterase: x / RBC: x / WBC x   Sq Epi: x / Non Sq Epi: x / Bacteria: x            RECENT CULTURES:  03-22 @ 11:26 .Stool Feces                No Protozoa seen by trichrome stain  No Helminths or Protozoa seen in formalin concentrate  performed by iodine stain  (routine O+P not evaluated for Microsporidia,  Cryptosporidia or Cyclospora)  One negative sample does not necessarily rule  out the presence of a parasitic infection.    03-19 @ 18:19 .Stool Feces                No enteric pathogens isolated.  (Stool culture examined for Salmonella,  Shigella, Campylobacter, Aeromonas, Plesiomonas,  Vibrio, E.coli O157 and Yersinia)    rad< from: [facility] Duplex Hemodialysis Access, Left (03.22.24 @ 10:09) >  KAREN GOFF     PROCEDURE DATE:  03/22/2024          INTERPRETATION:  Clinical information: 63-year-old status post left   brachial-basilic dialysis fistula with left upper extremity swelling and   pain. Assess for fistula patency.    Duplex and color flow Doppler evaluation of the left brachial artery to   basilic vein dialysis fistula was performed. No prior studies available   for comparison.    The inflow brachial vein is patent with the following peak systolic   velocities: Distal upper arm 216, at the anastomosis 503, distal to the   anastomosis 110 cm/s.    The outflow basilic vein is patent with aneurysmal dilatation at the   distal upper arm level. The outflow vein measures 2.8 cm in diameter.    The outflow basilic vein is patent with the following peak systolic   velocities: At the anastomosis 488, beyond the anastomosis 445, mid upper   arm 200, mid upper arm in-stent 283, proximal upper arm 329, axillary   vein 131 cm/s.    The diameters of the venous outflow at the mid upper arm are 1.8 x 2.6   cm. The diameters of the venous outflow at the distal upper arm are 2.1 x   2.7 cm.    The distal radial and ulnar arteries demonstrate antegrade flow.    The volume flow at the brachial artery is 1654 mL/m. The volume flow on   the venous side is 2001 mL/m.    IMPRESSION: Patent left brachial-basilic dialysis fistula. Findings   suggest mild to moderate stenosis at the anastomosis.    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    Volume flow on the venous side is 2001 mL/m.    Antegrade flow is noted in the left radial and ulnar arteries.    --- End of Report ---            BERTHA DELCID MD; Attending Radiologist  This document has been electronically signed. Mar 22 2024  2:54PM    < end of copied text >  < from: Xray Chest 1 View- PORTABLE-Routine (Xray Chest 1 View- PORTABLE-Routine in AM.) (03.21.24 @ 10:31) >  INTERPRETATION:  DATE OF STUDY: 3/21/24    PRIOR: 3/18/24    CLINICAL INDICATION: SOB    TECHNIQUE: AP radiograph of the chest.    FINDINGS:  Redemonstration of left axillary and left subclavian/brachiocephalic   stents.  Right-sided chest wall catheter with tip terminating in the region of the   superior cavoatrial junction.  Difficult to assess heart size on this AP projection.  Interval improvement in pulmonary edema changes.  No sizable pleural effusion. No pneumothorax.    IMPRESSION:  Interval improvement in pulmonary edema changes.    --- End of Report ---            NORBERT WALLER MD; Attending Radiologist  This document has been electronically signed. Mar 22 2024 10:39AM    < end of copied text >        RESPIRATORY CULTURES:          Studies  Chest X-RAY  CT SCAN Chest   Venous Dopplers: LE:   CT Abdomen  Others

## 2024-03-26 NOTE — PROGRESS NOTE ADULT - ASSESSMENT
64 y/o F with PMH of polycythemia vera, ESRD on HD M/W/F presenting with shortness of breath. Patient states she has had progressively worsening shortness of breath over the last 2 weeks. Pulmonary called to consult for SOB, likely 2nd to pulm edema.

## 2024-03-26 NOTE — PRE PROCEDURE NOTE - PRE PROCEDURE EVALUATION
Attending Physician:  Dr Munoz                        Procedure: EGD/colonoscopy    Indication for Procedure:  EV screening and diarrhea  ________________________________________________________  PAST MEDICAL & SURGICAL HISTORY:  ESRD on dialysis      Anemia secondary to renal failure      Polycythemia        ALLERGIES:  No Known Allergies    HOME MEDICATIONS:  Coreg 3.125 mg oral tablet: 1 tab(s) orally once a day NOTE PHARMACY HAS TWICE A DAY  famotidine 20 mg oral tablet: 1 tab(s) orally once a day  hydroxyurea 500 mg oral capsule: 1 cap(s) orally 3 times a week  Ojjaara 150 mg oral tablet: 1 tab(s) orally once a day  Carmen-Ben Rx oral tablet: 1 tab(s) orally once a day  ursodiol 300 mg oral capsule: 1 cap(s) orally 2 times a day    AICD/PPM: [ ] yes   [x ] no    PERTINENT LAB DATA:                        11.0   10.63 )-----------( 148      ( 26 Mar 2024 07:59 )             35.8     03-26    136  |  94<L>  |  22  ----------------------------<  107<H>  4.4   |  22  |  5.40<H>    Ca    10.2      26 Mar 2024 07:59      PT/INR - ( 26 Mar 2024 07:59 )   PT: 11.9 sec;   INR: 1.14 ratio         PTT - ( 26 Mar 2024 07:59 )  PTT:38.0 sec            PHYSICAL EXAMINATION:    T(C): 36.4  HR: 83  BP: 127/72  RR: 17  SpO2: 100%    Constitutional: NAD    Neck:  No JVD  Respiratory: no resp distress   Cardiovascular: RRR  Extremities: No peripheral edema  Neurological: A/O x 4, no focal deficits        COMMENTS:    The patient is a suitable candidate for the planned procedure unless box checked [ ]  No, explain:

## 2024-03-26 NOTE — DISCHARGE NOTE NURSING/CASE MANAGEMENT/SOCIAL WORK - PATIENT PORTAL LINK FT
You can access the FollowMyHealth Patient Portal offered by [facility] by registering at the following website: [email or web address]. By joining [employer]’s FollowMyHealth portal, you will also be able to view your health information using other applications (apps) compatible with our system.

## 2024-03-26 NOTE — PROGRESS NOTE ADULT - PROBLEM SELECTOR PLAN 4
er gi : s/p endoscopy:   found to have isolated gastric varices:   and one polyp : removed: :  defer to primary  team

## 2024-03-26 NOTE — PROGRESS NOTE ADULT - SUBJECTIVE AND OBJECTIVE BOX
Patient is a 63y old  Female who presents with a chief complaint of SOB x 2 weeks (26 Mar 2024 16:55)      SUBJECTIVE / OVERNIGHT EVENTS:     MEDICATIONS  (STANDING):  carvedilol 3.125 milliGRAM(s) Oral every 12 hours  chlorhexidine 2% Cloths 1 Application(s) Topical <User Schedule>  heparin   Injectable 5000 Unit(s) SubCutaneous every 8 hours  sodium chloride 0.9%. 500 milliLiter(s) (30 mL/Hr) IV Continuous <Continuous>  ursodiol Capsule 300 milliGRAM(s) Oral two times a day    MEDICATIONS  (PRN):      CAPILLARY BLOOD GLUCOSE        I&O's Summary    25 Mar 2024 07:01  -  26 Mar 2024 07:00  --------------------------------------------------------  IN: 240 mL / OUT: 0 mL / NET: 240 mL    26 Mar 2024 07:01  -  26 Mar 2024 21:52  --------------------------------------------------------  IN: 360 mL / OUT: 0 mL / NET: 360 mL      T(C): 36.8 (03-26-24 @ 20:20), Max: 36.8 (03-26-24 @ 20:20)  HR: 78 (03-26-24 @ 20:20) (69 - 88)  BP: 125/76 (03-26-24 @ 20:20) (94/53 - 125/76)  RR: 20 (03-26-24 @ 20:20) (17 - 22)  SpO2: 98% (03-26-24 @ 20:20) (96% - 100%)    PHYSICAL EXAM:  GENERAL: NAD, well-developed  HEAD:  Atraumatic, Normocephalic  EYES: EOMI, PERRLA, conjunctiva and sclera clear  NECK: Supple, No JVD  CHEST/LUNG: Clear to auscultation bilaterally; No wheeze  HEART: Regular rate and rhythm; No murmurs, rubs, or gallops  ABDOMEN: Soft, Nontender, Nondistended; Bowel sounds present  EXTREMITIES:  2+ Peripheral Pulses, No clubbing, cyanosis, or edema  PSYCH: AAOx3  NEUROLOGY: non-focal  SKIN: No rashes or lesions    LABS:                        11.0   10.63 )-----------( 148      ( 26 Mar 2024 07:59 )             35.8     03-26    136  |  94<L>  |  22  ----------------------------<  107<H>  4.4   |  22  |  5.40<H>    Ca    10.2      26 Mar 2024 07:59      PT/INR - ( 26 Mar 2024 07:59 )   PT: 11.9 sec;   INR: 1.14 ratio         PTT - ( 26 Mar 2024 07:59 )  PTT:38.0 sec      Urinalysis Basic - ( 26 Mar 2024 07:59 )    Color: x / Appearance: x / SG: x / pH: x  Gluc: 107 mg/dL / Ketone: x  / Bili: x / Urobili: x   Blood: x / Protein: x / Nitrite: x   Leuk Esterase: x / RBC: x / WBC x   Sq Epi: x / Non Sq Epi: x / Bacteria: x        RADIOLOGY & ADDITIONAL TESTS:    Imaging Personally Reviewed:    Consultant(s) Notes Reviewed:      Care Discussed with Consultants/Other Providers:

## 2024-03-26 NOTE — DISCHARGE NOTE NURSING/CASE MANAGEMENT/SOCIAL WORK - NSDCPNINST_GEN_ALL_CORE
call for  follow  up  appointment  with  primary care  md   and  follow  up  with dialysis    diet meds activity  as per md   any severe pain nausea  vomiting bleeding shortness breath    any  problems  call md call 911 [facility]  EMERGENCY ROOM

## 2024-03-27 ENCOUNTER — TRANSCRIPTION ENCOUNTER (OUTPATIENT)
Age: 64
End: 2024-03-27

## 2024-03-27 VITALS — DIASTOLIC BLOOD PRESSURE: 73 MMHG | OXYGEN SATURATION: 98 % | SYSTOLIC BLOOD PRESSURE: 114 MMHG | HEART RATE: 91 BPM

## 2024-03-27 LAB
ANION GAP SERPL CALC-SCNC: 23 MMOL/L — HIGH (ref 5–17)
BUN SERPL-MCNC: 36 MG/DL — HIGH (ref 7–23)
CALCIUM SERPL-MCNC: 9.6 MG/DL — SIGNIFICANT CHANGE UP (ref 8.4–10.5)
CALPROTECTIN STL-MCNT: 5 UG/G — SIGNIFICANT CHANGE UP (ref 0–120)
CHLORIDE SERPL-SCNC: 96 MMOL/L — SIGNIFICANT CHANGE UP (ref 96–108)
CO2 SERPL-SCNC: 18 MMOL/L — LOW (ref 22–31)
CREAT SERPL-MCNC: 6.86 MG/DL — HIGH (ref 0.5–1.3)
EGFR: 6 ML/MIN/1.73M2 — LOW
GLUCOSE SERPL-MCNC: 147 MG/DL — HIGH (ref 70–99)
HCT VFR BLD CALC: 31.1 % — LOW (ref 34.5–45)
HGB BLD-MCNC: 9.6 G/DL — LOW (ref 11.5–15.5)
MCHC RBC-ENTMCNC: 30.6 PG — SIGNIFICANT CHANGE UP (ref 27–34)
MCHC RBC-ENTMCNC: 30.9 GM/DL — LOW (ref 32–36)
MCV RBC AUTO: 99 FL — SIGNIFICANT CHANGE UP (ref 80–100)
NRBC # BLD: 0 /100 WBCS — SIGNIFICANT CHANGE UP (ref 0–0)
PLATELET # BLD AUTO: 140 K/UL — LOW (ref 150–400)
POTASSIUM SERPL-MCNC: 4.3 MMOL/L — SIGNIFICANT CHANGE UP (ref 3.5–5.3)
POTASSIUM SERPL-SCNC: 4.3 MMOL/L — SIGNIFICANT CHANGE UP (ref 3.5–5.3)
RBC # BLD: 3.14 M/UL — LOW (ref 3.8–5.2)
RBC # FLD: 20.6 % — HIGH (ref 10.3–14.5)
SODIUM SERPL-SCNC: 137 MMOL/L — SIGNIFICANT CHANGE UP (ref 135–145)
WBC # BLD: 8.27 K/UL — SIGNIFICANT CHANGE UP (ref 3.8–10.5)
WBC # FLD AUTO: 8.27 K/UL — SIGNIFICANT CHANGE UP (ref 3.8–10.5)

## 2024-03-27 PROCEDURE — 90935 HEMODIALYSIS ONE EVALUATION: CPT | Mod: GC

## 2024-03-27 RX ORDER — CARVEDILOL PHOSPHATE 80 MG/1
1 CAPSULE, EXTENDED RELEASE ORAL
Qty: 0 | Refills: 0 | DISCHARGE
Start: 2024-03-27

## 2024-03-27 RX ADMIN — URSODIOL 300 MILLIGRAM(S): 250 TABLET, FILM COATED ORAL at 16:59

## 2024-03-27 RX ADMIN — CARVEDILOL PHOSPHATE 3.12 MILLIGRAM(S): 80 CAPSULE, EXTENDED RELEASE ORAL at 16:59

## 2024-03-27 RX ADMIN — CHLORHEXIDINE GLUCONATE 1 APPLICATION(S): 213 SOLUTION TOPICAL at 05:06

## 2024-03-27 RX ADMIN — URSODIOL 300 MILLIGRAM(S): 250 TABLET, FILM COATED ORAL at 05:06

## 2024-03-27 NOTE — DISCHARGE NOTE PROVIDER - NSDCFUADDAPPT_GEN_ALL_CORE_FT
APPTS ARE READY TO BE MADE: [x ] YES    Best Family or Patient Contact (if needed):    Additional Information about above appointments (if needed):    1:   2:   3:     Other comments or requests:    APPTS ARE READY TO BE MADE: [x ] YES    Best Family or Patient Contact (if needed):    Additional Information about above appointments (if needed):    1:   2:   3:     Other comments or requests:   Prior to outreaching the patient, it was visible that the patient has secured a follow up appointment which was not scheduled by our team. 04/04 10:30am with Dr. Guo 04/04 4:00pm with Dr. Malik 04/11 9:00am with Dr. Leo

## 2024-03-27 NOTE — PROGRESS NOTE ADULT - NUTRITIONAL ASSESSMENT
This patient has been assessed with a concern for Malnutrition and has been determined to have a diagnosis/diagnoses of Severe protein-calorie malnutrition.    This patient is being managed with:   Diet DASH/TLC-  Sodium & Cholesterol Restricted  For patients receiving Renal Replacement - No Protein Restr No Conc K No Conc Phos Low Sodium (RENAL)  Entered: Mar 18 2024  2:02PM    The following pending diet order is being considered for treatment of Severe protein-calorie malnutrition:  Diet Renal Restrictions-  For patients receiving Renal Replacement - No Protein Restr No Conc K No Conc Phos Low Sodium  Liquid Protein Supplement     Qty per Day:  2  Entered: Mar 21 2024 12:32PM  
This patient has been assessed with a concern for Malnutrition and has been determined to have a diagnosis/diagnoses of Severe protein-calorie malnutrition.    This patient is being managed with:   Diet DASH/TLC-  Sodium & Cholesterol Restricted  For patients receiving Renal Replacement - No Protein Restr No Conc K No Conc Phos Low Sodium (RENAL)  Entered: Mar 18 2024  2:02PM    The following pending diet order is being considered for treatment of Severe protein-calorie malnutrition:  Diet Renal Restrictions-  For patients receiving Renal Replacement - No Protein Restr No Conc K No Conc Phos Low Sodium  Liquid Protein Supplement     Qty per Day:  2  Entered: Mar 21 2024 12:32PM  
This patient has been assessed with a concern for Malnutrition and has been determined to have a diagnosis/diagnoses of Severe protein-calorie malnutrition.    This patient is being managed with:   Diet Regular-  Entered: Mar 26 2024  3:26PM    The following pending diet order is being considered for treatment of Severe protein-calorie malnutrition:  Diet Renal Restrictions-  For patients receiving Renal Replacement - No Protein Restr No Conc K No Conc Phos Low Sodium  Liquid Protein Supplement     Qty per Day:  2  Entered: Mar 21 2024 12:32PM  
This patient has been assessed with a concern for Malnutrition and has been determined to have a diagnosis/diagnoses of Severe protein-calorie malnutrition.    This patient is being managed with:   Diet DASH/TLC-  Sodium & Cholesterol Restricted  For patients receiving Renal Replacement - No Protein Restr No Conc K No Conc Phos Low Sodium (RENAL)  Entered: Mar 18 2024  2:02PM    The following pending diet order is being considered for treatment of Severe protein-calorie malnutrition:  Diet Renal Restrictions-  For patients receiving Renal Replacement - No Protein Restr No Conc K No Conc Phos Low Sodium  Liquid Protein Supplement     Qty per Day:  2  Entered: Mar 21 2024 12:32PM  
This patient has been assessed with a concern for Malnutrition and has been determined to have a diagnosis/diagnoses of Severe protein-calorie malnutrition.    This patient is being managed with:   Diet NPO after Midnight-     NPO Start Date: 25-Mar-2024   NPO Start Time: 23:59  Entered: Mar 25 2024  7:59AM    Diet Clear Liquid-  Entered: Mar 25 2024  7:45AM    The following pending diet order is being considered for treatment of Severe protein-calorie malnutrition:  Diet Renal Restrictions-  For patients receiving Renal Replacement - No Protein Restr No Conc K No Conc Phos Low Sodium  Liquid Protein Supplement     Qty per Day:  2  Entered: Mar 21 2024 12:32PM  
This patient has been assessed with a concern for Malnutrition and has been determined to have a diagnosis/diagnoses of Severe protein-calorie malnutrition.    This patient is being managed with:   Diet Regular-  Entered: Mar 26 2024  3:26PM    The following pending diet order is being considered for treatment of Severe protein-calorie malnutrition:  Diet Renal Restrictions-  For patients receiving Renal Replacement - No Protein Restr No Conc K No Conc Phos Low Sodium  Liquid Protein Supplement     Qty per Day:  2  Entered: Mar 21 2024 12:32PM  
This patient has been assessed with a concern for Malnutrition and has been determined to have a diagnosis/diagnoses of Severe protein-calorie malnutrition.    This patient is being managed with:   Diet DASH/TLC-  Sodium & Cholesterol Restricted  For patients receiving Renal Replacement - No Protein Restr No Conc K No Conc Phos Low Sodium (RENAL)  Entered: Mar 18 2024  2:02PM    The following pending diet order is being considered for treatment of Severe protein-calorie malnutrition:  Diet Renal Restrictions-  For patients receiving Renal Replacement - No Protein Restr No Conc K No Conc Phos Low Sodium  Liquid Protein Supplement     Qty per Day:  2  Entered: Mar 21 2024 12:32PM  

## 2024-03-27 NOTE — PROGRESS NOTE ADULT - PROBLEM SELECTOR PLAN 1
2nd to fluid overload, hypertensive urgency   -S/p RRT in ED for tachypnea, tachycardia to 140s, SBP 200s. Placed on Bipap for increased WOB, Nitro drip. Tx to MICU for urgent HD  -CT chest with b/l effusions, pulm edema +/- underlying PNA. Finishing course of empiric ABX.   -Hypoxia now resolved, tolerating room air  -CXR 3/21 with improvement in pulmonary edema  -Keep sats >90% with O2 PRN  -Keep O>I as tolerated with HD.
Pt was getting HD from [facility] and her last HD was on 3/15/24 and mentioned that she did not skip any medications/ follow ups. Pt follows Dr. De León. At the time of presentation, pt has high blood pressure in 200s/120s. She was placed on NTG gtt and BiPAP. CT chest showed fluid in the Rt > Lt along with ? infectious process. Pro BNP elevated. Dialysis was done in MICU after the RRT in ED. 2.5L fluid was removed initially and the next session of HD was done on Wednesday and she is has no respiratory distress today. Pt was advised to follow the dietary restrictions. Echo showed EF of 62% and PASP 30. Vascular team to evaluate the AVF ( erythematous lesion on AVF). Vascular team evaluated the AVF.  Ojjara can cause diarrhea, black tarry stools as well as hematochezia    PLAN  -  UF as 1.5L/ as tolerated.. Strict I/O and daily wts. Dose medications to ESRD dosing. Rest of the management as per primary team.    Anemia: Hg at goal. On Ojjara. No INDER. Colonoscopy  was reviewed . Management as per primary team.     Phos is <6.5. Not on any binder. Renal diet for now. Trend and monitor.  Plan to discharge today as per pt. Will inform [facility] center.
2nd to fluid overload, hypertensive urgency   -S/p RRT in ED for tachypnea, tachycardia to 140s, SBP 200s. Placed on Bipap for increased WOB, Nitro drip. Tx to MICU for urgent HD  -CT chest with b/l effusions, pulm edema +/- underlying PNA. Finishing course of empiric ABX.   -Hypoxia now resolved, tolerating room air  -CXR 3/21 with improvement in pulmonary edema  -Keep sats >90% with O2 PRN  -Keep O>I as tolerated with HD.:  3/24: resolved: on room air: finish antibiotics tomorrow after the dose:  started from 19th  3/25: last day of antibiotics today  : doing  ok : no resp symptoms:  no wheezing
2nd to fluid overload, hypertensive urgency   -S/p RRT in ED for tachypnea, tachycardia to 140s, SBP 200s. Placed on Bipap for increased WOB, Nitro drip. Tx to MICU for urgent HD  -CT chest with b/l effusions, pulm edema +/- underlying PNA. Finishing course of empiric ABX.   -Hypoxia now resolved, tolerating room air  -CXR 3/21 with improvement in pulmonary edema  -Keep sats >90% with O2 PRN  -Keep O>I as tolerated with HD.:  3/24: resolved: on room air: finish antibiotics tomorrow after the dose:  started from 19th
Pt was getting HD from [facility] and her last HD was on 3/15/24 and mentioned that she did not skip any medications/ follow ups. Pt follows Dr. De León. At the time of presentation, pt has high blood pressure in 200s/120s. She was placed on NTG gtt and BiPAP. CT chest showed fluid in the Rt > Lt along with ? infectious process. Pro BNP elevated. Dialysis was done in MICU after the RRT in ED. 2.5L fluid was removed initially and the next session of HD was done on Wednesday and she is has no respiratory distress today. Pt was advised to follow the dietary restrictions. Echo with no e/o systolic dysfunction. Vascular team to evaluate the AVF ( erythematous lesion on AVF). Strict I/O and daily wts. Dose medications to ESRD dosing. Rest of the management as per primary team.    Anemia: Hg at goal. On Ojjara. No INDER.
Pt was getting HD from [facility] and her last HD was on 3/15/24 and mentioned that she did not skip any medications/ follow ups. Pt follows Dr. De León. At the time of presentation, pt has high blood pressure in 200s/120s. She was placed on NTG gtt and BiPAP. CT chest showed fluid in the Rt > Lt along with ? infectious process. Pro BNP elevated. Dialysis was done in MICU after the RRT in ED. 2.5L fluid was removed.    Now with significant clinical improvement  Tolerating HD well. Maintain current prescription  Pt was advised to follow the dietary restrictions.  Echo with no e/o systolic dysfunction   Strict I/O and daily wts. Dose medications to ESRD dosing. Rest of the management as per primary team.    Anemia: Hg at goal    AVF: WIth mild erythema . Per patient it has been present for a few weeks  Please consult vascular to evaluate        Piedad Zhou MD  O: [phone]  Contact me on teams
Pt was getting HD from [facility] and her last HD was on 3/15/24 and mentioned that she did not skip any medications/ follow ups. Pt follows Dr. De León. At the time of presentation, pt has high blood pressure in 200s/120s. She was placed on NTG gtt and BiPAP. CT chest showed fluid in the Rt > Lt along with ? infectious process. Pro BNP elevated. Dialysis was done in MICU after the RRT in ED. 2.5L fluid was removed initially and the next session of HD was done on Wednesday and she is has no respiratory distress today. Pt was advised to follow the dietary restrictions. Echo showed EF of 62% and PASP 30. Vascular team to evaluate the AVF ( erythematous lesion on AVF). Vascular team evaluated the AVF. pt is going for colonoscopy today. UF as 1.5L/ as tolerated. Ojjara can cause diarrhea, black tarry stools as well as hematochezia. Strict I/O and daily wts. Dose medications to ESRD dosing. Rest of the management as per primary team.    Anemia: Hg at goal. On Ojjara. No INDER. Colonoscopy today. Management as per primary team.     Phos is 5.7 today. Not on any binder. Renal diet for now. Trend and monitor.
Pt was getting HD from [facility] and her last HD was on 3/15/24 and mentioned that she did not skip any medications/ follow ups. Pt follows Dr. De León. At the time of presentation, pt has high blood pressure in 200s/120s. She was placed on NTG gtt and BiPAP. CT chest showed fluid in the Rt > Lt along with ? infectious process. BP was better and she was on BiPAP. Labs were reviewed. H/H stable. Pro BNP elevated. Dialysis was done in MICU after the RRT in ED. 2.5L fluid was removed. Respiratory status has improved. K and phos are below the normal range.  Pt was advised to follow the dietary restrictions. She can get her next HD tomorrow. Strict I/O and daily wts. Dose medications to ESRD dosing. Rest of the management as per primary team.
2nd to fluid overload, hypertensive urgency   -S/p RRT in ED for tachypnea, tachycardia to 140s, SBP 200s. Placed on Bipap for increased WOB, Nitro drip. Tx to MICU for urgent HD  -CT chest with b/l effusions, pulm edema +/- underlying PNA. Finishing course of empiric ABX.   -Hypoxia now resolved, tolerating room air  -CXR 3/21 with improvement in pulmonary edema  -Keep sats >90% with O2 PRN  -Keep O>I as tolerated with HD.:  3/24: resolved: on room air: finish antibiotics tomorrow after the dose:  started from 19th  3/25: last day of antibiotics today  : doing  ok : no resp symptoms:  no wheezing
2nd to fluid overload, hypertensive urgency   -S/p RRT in ED for tachypnea, tachycardia to 140s, SBP 200s. Placed on Bipap for increased WOB, Nitro drip. Tx to MICU for urgent HD  -CT chest with b/l effusions, pulm edema +/- underlying PNA. Finishing course of empiric ABX.   -Hypoxia now resolved, tolerating room air  -CXR 3/21 with improvement in pulmonary edema  -Keep sats >90% with O2 PRN  -Keep O>I as tolerated with HD.:  3/24: resolved: on room air: finish antibiotics tomorrow after the dose:  started from 19th  3/25: last day of antibiotics today  : doing  ok : no resp symptoms:  no wheezing  3/27: seems pretty good:  no wheezing:  cont current rx:  for dc

## 2024-03-27 NOTE — PROGRESS NOTE ADULT - ASSESSMENT
Acute Hypoxic respiratory Failure       CT chest shows Fluid overload with small bilateral pleural effusions with or without   superimposed infectious/inflammatory process. Partially imaged   multiseptated cystic regions right lower lobe  Leukocytosis       From Fluid Over load /Pneumonia  in the setting of ESRD   Echo tele   Cards eval called     Pneumonia IV abx completed   Follow up cx       Diarrhea   Anemia     s/p EGD   Colonoscopy   Biopsies taken   in the setting of ESRD   Polycythemia Continue with Hydroxyurea       Addendum Patient noted to be in respiratory distress, placed on Biapa, Nitro drip IN THE ed, transferred level of care to MICU for emergent HD.

## 2024-03-27 NOTE — PROGRESS NOTE ADULT - SUBJECTIVE AND OBJECTIVE BOX
Patient is a 63y old  Female who presents with a chief complaint of SOB x 2 weeks (26 Mar 2024 16:55)      SUBJECTIVE / OVERNIGHT EVENTS: no events       T(C): 36.6 (03-27-24 @ 12:21), Max: 36.8 (03-27-24 @ 05:04)  HR: 95 (03-27-24 @ 12:21) (79 - 97)  BP: 124/73 (03-27-24 @ 12:21) (114/67 - 146/78)  RR: 18 (03-27-24 @ 12:21) (17 - 18)  SpO2: 97% (03-27-24 @ 12:21) (96% - 99%)      MEDICATIONS  (STANDING):  carvedilol 3.125 milliGRAM(s) Oral every 12 hours  chlorhexidine 2% Cloths 1 Application(s) Topical <User Schedule>  heparin   Injectable 5000 Unit(s) SubCutaneous every 8 hours  sodium chloride 0.9%. 500 milliLiter(s) (30 mL/Hr) IV Continuous <Continuous>  ursodiol Capsule 300 milliGRAM(s) Oral two times a day    MEDICATIONS  (PRN):      PHYSICAL EXAM:  GENERAL: NAD, well-developed  HEAD:  Atraumatic, Normocephalic  EYES: EOMI, conjunctiva and sclera clear  NECK: Supple, No JVD  CHEST/LUNG: Clear to auscultation bilaterally; No wheeze  HEART: Regular rate and rhythm; No murmurs, rubs, or gallops  ABDOMEN: Soft, Nontender, Nondistended; Bowel sounds present  EXTREMITIES:  2+ Peripheral Pulses, No clubbing, cyanosis, or edema  PSYCH: AAOx3  NEUROLOGY: non-focal  SKIN: No rashes or lesions                           9.6    8.27  )-----------( 140      ( 27 Mar 2024 10:31 )             31.1             PT/INR - ( 26 Mar 2024 07:59 )   PT: 11.9 sec;   INR: 1.14 ratio         PTT - ( 26 Mar 2024 07:59 )  PTT:38.0 sec  137|96|36<147  4.3|18|6.86  9.6,--,--  03-27 @ 10:31      RADIOLOGY & ADDITIONAL TESTS:    Imaging Personally Reviewed:    Consultant(s) Notes Reviewed:      Care Discussed with Consultants/Other Providers:

## 2024-03-27 NOTE — PROGRESS NOTE ADULT - ATTENDING COMMENTS
Diarrhea  Rule out varices  EGD colonoscopy tomorrow
ESRD:   Underwent HD with UF yesterday   Now clinically improved  Schedule for HD in am     Rest as per Dr. Juanita Zhou MD  O: [phone]  Contact me on teams
ESRD: HD today   Will attempt  UF close to 2 liters  F/U Duplex of AVF    Rest as per Dr. Juanita Zhou MD  O: [phone]  Contact me on teams
ESRD: Seen at HD  Tolerating well  UF lowered to 1 kg   Hg at goal  For EGD/Colonoscopy today  F/U Duplex of AVF with patent left brachial-basilic dialysis fistula. Noted. OK to use per vascular  + mild to moderate stenosis at the anastomosis. 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           Rest as per Dr. Juanita Zhou MD  O: [phone]  Contact me on teams
ESRD: Seen at HD  Tolerating well  UF with HD. Goal 2 L. Maintain current prescription  Hg near  goal  EGD: +type 1 isolated gastric varices. No esophageal varices  Colonoscopy : noted. +Hemorrhoid.+Polyp. +Erythematous mucosa in the descending colog s/o barotrauma.  No colitis.   F/U Duplex of AVF with patent left brachial-basilic dialysis fistula. Noted. + mild to moderate stenosis at the anastomosis. 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 OK to use per vascular  Completed antibiotics      Rest as per Dr. Juanita Zhou MD  O: [phone]  Contact me on teams
Agree with above. Pending stool tests as above. If diarrhea persists, will plan for colonoscopy +/- EGD, if fluid status is optimized.
63 y.o. Polycythemia vera MAK 2+ (2012) w/ secondary myelofibrosis, ESRD on HD (M/W/F) via LUE AVF, splenic vein thrombosis (2015), splenomegaly, portal HTN, who p/w SOB, progressively worsening SOB over the last 2 weeks.  Admitted to MICU for urgent HD in the setting of hypoxemic respiratory failure/volume overload/hypertensive urgency.   #Neuro: awake alert  #CV: hypertensive urgency, s/p Nitro gtt. Now normotensive off gtts  #Resp: hypoxemic respiratory failure requring BiPAP in the setting of volume overload. Now doing well on 2L NC  #GI: splenomegatly, portal HTN, chronic diarrhea x2 months. Plan for EGD/colonoscopy with GI  #Renal: ESRD on HD M/W/F. s/p urgent HD on 3/18 with 3.3 L out. Plan for HD on 3/20  #Heme/Onc:  Polycythemia with secondary myelofibrosis. Heme consult appreciated. Ojjaara and Hydroxyurea on hold.   #ID: empiric CAP coverage for now. f/u cultures  #Endocrine:  monitor FS  #PPX: SQH  #GOC: full code. Stable for transfer to floors

## 2024-03-27 NOTE — PROGRESS NOTE ADULT - PROBLEM SELECTOR PLAN 3
w/ progression to myelofibrosis, splenomegaly   -Per heme/onc.

## 2024-03-27 NOTE — DISCHARGE NOTE PROVIDER - NSFOLLOWUPCLINICS_GEN_ALL_ED_FT
[facility]  Gastroenterology  [address] [place] [id number]  Phone: [phone]  Fax:   Follow Up Time: 1 week     [facility]  Gastroenterology  [address] [place] [address] [address]  [place] [address]  Phone: [phone]  Fax:   Follow Up Time: 1 week    [facility]  Hematology/Oncology  [address] [place] [address]  Phone: [phone]  Fax:   Follow Up Time: 1-3 days    [facility]  General Internal Medicine  [address] [place] [address]  Phone: [phone]  Fax:   Follow Up Time: Routine

## 2024-03-27 NOTE — DISCHARGE NOTE PROVIDER - NSDCACTIVITY_GEN_ALL_CORE
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[facility])     S/p resection and xrt, follows with oncologist at [address] [place] [address]  Endocrine    Other specified hypothyroidism     Has not had labs in quite some time and is asking for a referral to local endocrinologist   Clinically euthyroid and for now will continue levothyroxine as ordered  Relevant Orders    Ambulatory Referral to Endocrinology    TSH, 3rd generation with Free T4 reflex       Cardiovascular and Mediastinum    Essential hypertension     Well controlled on current medications and will continue as ordered  EKG shows NSR @ 81 bpm, normal EKG  Relevant Orders    CBC and differential    Comprehensive metabolic panel    Lipid panel    POCT ECG (Completed)   Other Visit Diagnoses     Medicare annual wellness visit, subsequent    -  Primary    Relevant Orders    POCT ECG (Completed)    Colon cancer screening        Relevant Orders    Cologuard           Preventive health issues were discussed with patient, and age appropriate screening tests were ordered as noted in patient's After Visit Summary  Personalized health advice and appropriate referrals for health education or preventive services given if needed, as noted in patient's After Visit Summary  History of Present Illness:     Patient presents for a Medicare Wellness Visit    Here for follow up and AWV  She remains active and feels well  She reports all of her siblings have  from atrial fibrillation  She does have occasional palpitations but they are not prolonged  Has been following with endocrinology at [address] [place] [address] and would like someone closer  Has not had labs recently  There is no cold or heat intolerance, diarrhea or constipation, hair or skin changes, unanticipated weight gain or loss         Patient Care Team:  Thaddeus Mcduffie MD as PCP - General (Internal Medicine)     Review of Systems:     Review of Systems   Constitutional: Negative  HENT: Negative  Eyes: Negative  Respiratory: Negative  Cardiovascular: Positive for palpitations  Gastrointestinal: Negative  Endocrine: Negative  Genitourinary: Negative  Musculoskeletal: Negative  Skin: Negative  Allergic/Immunologic: Negative  Neurological: Negative  Hematological: Negative  Psychiatric/Behavioral: Negative           Problem List:     Patient Active Problem List   Diagnosis   • Essential hypertension   • Other specified hypothyroidism   • Cancer of parotid gland ([facility] )   • Meniere's disease of both ears   • Anxiety      Past Medical and Surgical History:     Past Medical History:   Diagnosis Date   • Cancer ([facility] )      Past Surgical History:   Procedure Laterality Date   • HIP SURGERY Bilateral    • HYSTERECTOMY     • PAROTIDECTOMY     • REPLACEMENT TOTAL KNEE BILATERAL Left    • SKIN BIOPSY        Family History:     Family History   Problem Relation Age of Onset   • No Known Problems Mother    • Parkinsonism Father    • Dementia Maternal Grandmother    • Diabetes Sister       Social History:     Social History     Socioeconomic History   • Marital status: /Civil Union     Spouse name: None   • Number of children: None   • Years of education: None   • Highest education level: None   Occupational History   • None   Tobacco Use   • Smoking status: Never   • Smokeless tobacco: Never   Vaping Use   • Vaping Use: Never used   Substance and Sexual Activity   • Alcohol use: Not Currently   • Drug use: Never   • Sexual activity: None   Other Topics Concern   • None   Social History Narrative   • None     Social Determinants of Health     Financial Resource Strain: Low Risk  (6/5/2023)    Overall Financial Resource Strain (CARDIA)    • Difficulty of Paying Living Expenses: Not very hard   Food Insecurity: Not on file   Transportation Needs: No Transportation Needs (6/5/2023)    PRAPARE - Transportation • Lack of Transportation (Medical): No    • Lack of Transportation (Non-Medical): No   Physical Activity: Not on file   Stress: Not on file   Social Connections: Not on file   Intimate Partner Violence: Not on file   Housing Stability: Not on file      Medications and Allergies:     Current Outpatient Medications   Medication Sig Dispense Refill   • fosinopril (MONOPRIL) 40 mg tablet TAKE 1 TABLET BY MOUTH EVERY DAY 90 tablet 3   • levothyroxine 112 mcg tablet Take 112 mcg by mouth daily     • PARoxetine (PAXIL) 20 mg tablet Take 20 mg by mouth daily     • triamterene-hydrochlorothiazide (DYAZIDE) 37 5-25 mg per capsule TAKE 1 CAPSULE BY MOUTH EVERY DAY IN THE MORNING 90 capsule 3     No current facility-administered medications for this visit  Allergies   Allergen Reactions   • Amoxicillin Swelling      Immunizations:     Immunization History   Administered Date(s) Administered   • COVID-19 MODERNA VACC 0 5 ML IM 03/11/2021, 04/13/2021   • INFLUENZA 10/15/2022   • Zoster Vaccine Recombinant 09/17/2019, 12/14/2019      Health Maintenance:         Topic Date Due   • Hepatitis C Screening  Never done   • Colorectal Cancer Screening  Never done   • Breast Cancer Screening: Mammogram  09/08/2022         Topic Date Due   • Pneumococcal Vaccine: 65+ Years (1 - PCV) Never done   • COVID-19 Vaccine (3 - Jearlean Starch series) 06/08/2021      Medicare Screening Tests and Risk Assessments:     Smita Hernández is here for her Subsequent Wellness visit  Health Risk Assessment:   Patient rates overall health as very good  Patient feels that their physical health rating is much better  Patient is very satisfied with their life  Eyesight was rated as same  Hearing was rated as same  Patient feels that their emotional and mental health rating is much better  Patients states they are never, rarely angry  Patient states they are never, rarely unusually tired/fatigued  Pain experienced in the last 7 days has been none   Patient states that she has experienced no weight loss or gain in last 6 months  Have lost weight because of trying  No soda, less portions    Fall Risk Screening: In the past year, patient has experienced: no history of falling in past year      Urinary Incontinence Screening:   Patient has leaked urine accidently in the last six months  Sometimes when I cough    Home Safety:  Patient does not have trouble with stairs inside or outside of their home  Patient has working smoke alarms and has working carbon monoxide detector  Home safety hazards include: none  Nutrition:   Current diet is Regular  Medications:   Patient is not currently taking any over-the-counter supplements  Patient is able to manage medications  Activities of Daily Living (ADLs)/Instrumental Activities of Daily Living (IADLs):   Walk and transfer into and out of bed and chair?: Yes  Dress and groom yourself?: Yes    Bathe or shower yourself?: Yes    Feed yourself? Yes  Do your laundry/housekeeping?: Yes  Manage your money, pay your bills and track your expenses?: Yes  Make your own meals?: Yes    Do your own shopping?: Yes    Previous Hospitalizations:   Any hospitalizations or ED visits within the last 12 months?: No      Advance Care Planning:   Living will: Yes    Durable POA for healthcare:  Yes    Advanced directive: Yes    End of Life Decisions reviewed with patient: Yes      Cognitive Screening:   Provider or family/friend/caregiver concerned regarding cognition?: No    PREVENTIVE SCREENINGS      Cardiovascular Screening:    General: Risks and Benefits Discussed    Due for: Lipid Panel      Diabetes Screening:     General: Risks and Benefits Discussed    Due for: Blood Glucose      Colorectal Cancer Screening:     General: Screening Current      Breast Cancer Screening:     General: Screening Current      Cervical Cancer Screening:    General: Screening Not Indicated      Osteoporosis Screening:    General: Screening Current      Abdominal Aortic "Aneurysm (AAA) Screening:        General: Screening Not Indicated      Lung Cancer Screening:     General: Screening Not Indicated      Hepatitis C Screening:    General: Screening Not Indicated    Screening, Brief Intervention, and Referral to Treatment (SBIRT)    Screening  Typical number of drinks in a day: 0  Typical number of drinks in a week: 0  Interpretation: Low risk drinking behavior  AUDIT-C Screenin) How often did you have a drink containing alcohol in the past year? never  2) How many drinks did you have on a typical day when you were drinking in the past year? 0  3) How often did you have 6 or more drinks on one occasion in the past year? never    AUDIT-C Score: 0  Interpretation: Score 0-2 (female): Negative screen for alcohol misuse    Single Item Drug Screening:  How often have you used an illegal drug (including marijuana) or a prescription medication for non-medical reasons in the past year? never    Single Item Drug Screen Score: 0  Interpretation: Negative screen for possible drug use disorder    Brief Intervention  Alcohol & drug use screenings were reviewed  No concerns regarding substance use disorder identified  Other Counseling Topics:   Car/seat belt/driving safety, skin self-exam, sunscreen and calcium and vitamin D intake and regular weightbearing exercise  No results found  Physical Exam:     /72   Pulse 82   Temp (!) 97 2 °F (36 2 °C)   Resp 18   Ht 5' 8\" (1 727 m)   Wt 78 9 kg (174 lb)   SpO2 97%   BMI 26 46 kg/m²     Physical Exam  Vitals and nursing note reviewed  Constitutional:       General: She is not in acute distress  Appearance: She is well-developed  HENT:      Head: Normocephalic and atraumatic  Eyes:      Conjunctiva/sclera: Conjunctivae normal    Cardiovascular:      Rate and Rhythm: Normal rate and regular rhythm  Heart sounds: No murmur heard  Pulmonary:      Effort: Pulmonary effort is normal  No respiratory distress      " Breath sounds: Normal breath sounds  Abdominal:      Palpations: Abdomen is soft  Tenderness: There is no abdominal tenderness  Musculoskeletal:         General: No swelling  Cervical back: Neck supple  Skin:     General: Skin is warm and dry  Capillary Refill: Capillary refill takes less than 2 seconds  Neurological:      Mental Status: She is alert     Psychiatric:         Mood and Affect: Mood normal           Ramona Stewart MD No heavy lifting/straining

## 2024-03-27 NOTE — PROGRESS NOTE ADULT - REASON FOR ADMISSION
SOB x 2 weeks

## 2024-03-27 NOTE — PROGRESS NOTE ADULT - SUBJECTIVE AND OBJECTIVE BOX
[facility] Division of Kidney Diseases & Hypertension  FOLLOW UP NOTE  [phone]--------------------------------------------------------------------------------  Chief Complaint:Acute pulmonary edema        24 hour events/subjective:  Pt was seen and examined at the bedside during HD. No acute overnight events. No fresh complaints.   Pt denies SOB/ Constipation/ Diarrhea/ Nausea/ Vomiting/ abdominal pain/ chest pain/ tingling/ numbness.         PAST HISTORY  --------------------------------------------------------------------------------  No significant changes to PMH, PSH, FHx, SHx, unless otherwise noted    ALLERGIES & MEDICATIONS  --------------------------------------------------------------------------------  Allergies    No Known Allergies    Intolerances      Standing Inpatient Medications  carvedilol 3.125 milliGRAM(s) Oral every 12 hours  chlorhexidine 2% Cloths 1 Application(s) Topical <User Schedule>  heparin   Injectable 5000 Unit(s) SubCutaneous every 8 hours  sodium chloride 0.9%. 500 milliLiter(s) IV Continuous <Continuous>  ursodiol Capsule 300 milliGRAM(s) Oral two times a day    PRN Inpatient Medications      REVIEW OF SYSTEMS  --------------------------------------------------------------------------------  as above.     VITALS/PHYSICAL EXAM  --------------------------------------------------------------------------------  T(C): 36.6 (03-27-24 @ 12:21), Max: 36.8 (03-26-24 @ 20:20)  HR: 95 (03-27-24 @ 12:21) (72 - 97)  BP: 124/73 (03-27-24 @ 12:21) (99/51 - 146/78)  RR: 18 (03-27-24 @ 12:21) (17 - 21)  SpO2: 97% (03-27-24 @ 12:21) (96% - 100%)  Wt(kg): --  Height (cm): 157.5 (03-26-24 @ 10:32)  Weight (kg): 52.7 (03-26-24 @ 10:32)  BMI (kg/m2): 21.2 (03-26-24 @ 10:32)  BSA (m2): 1.52 (03-26-24 @ 10:32)      03-26-24 @ 07:01  -  03-27-24 @ 07:00  --------------------------------------------------------  IN: 360 mL / OUT: 0 mL / NET: 360 mL    03-27-24 @ 07:01  -  03-27-24 @ 12:53  --------------------------------------------------------  IN: 480 mL / OUT: 2000 mL / NET: -1520 mL      Physical Exam:  in no apparent distress  Neck: Supple, no JVD,    Lungs: no rhonchi, no wheeze, no crackles  CVS: S1 S2 no M/R/G  Abdomen: no organomegaly, BS present. Mild tenderness of the abdomen on deep palpation.   Neuro: Grossly intact  Skin: warm, dry  Ext: no cyanosis or clubbing, no edema  Access:+ AVF, mild erythema present ( interval improvement)    LABS/STUDIES  --------------------------------------------------------------------------------              9.6    8.27  >-----------<  140      [03-27-24 @ 10:31]              31.1     137  |  96  |  36  ----------------------------<  147      [03-27-24 @ 10:31]  4.3   |  18  |  6.86        Ca     9.6     [03-27-24 @ 10:31]      PT/INR: PT 11.9 , INR 1.14       [03-26-24 @ 07:59]  PTT: 38.0       [03-26-24 @ 07:59]      Creatinine Trend:  SCr 6.86 [03-27 @ 10:31]  SCr 5.40 [03-26 @ 07:59]  SCr 8.45 [03-25 @ 07:21]  SCr 6.98 [03-24 @ 07:01]  SCr 6.95 [03-22 @ 06:01]    Urinalysis - [03-27-24 @ 10:31]      Color  / Appearance  / SG  / pH       Gluc 147 / Ketone   / Bili  / Urobili        Blood  / Protein  / Leuk Est  / Nitrite       RBC  / WBC  / Hyaline  / Gran  / Sq Epi  / Non Sq Epi  / Bacteria         HBsAb >1000.0      [03-25-24 @ 10:22]  HBsAg Nonreact      [03-25-24 @ 10:22]  HCV 0.21, Nonreact      [03-25-24 @ 10:22]

## 2024-03-27 NOTE — PROGRESS NOTE ADULT - SUBJECTIVE AND OBJECTIVE BOX
Date of Service: 03-27-24 @ 17:50    Patient is a 63y old  Female who presents with a chief complaint of SOB x 2 weeks (27 Mar 2024 13:21)      Any change in ROS: Seems pretty good:  no sob:      MEDICATIONS  (STANDING):  carvedilol 3.125 milliGRAM(s) Oral every 12 hours  chlorhexidine 2% Cloths 1 Application(s) Topical <User Schedule>  heparin   Injectable 5000 Unit(s) SubCutaneous every 8 hours  heparin  Lock Flush 100 Units/mL Injectable 300 Unit(s) IV Push once  sodium chloride 0.9%. 500 milliLiter(s) (30 mL/Hr) IV Continuous <Continuous>  ursodiol Capsule 300 milliGRAM(s) Oral two times a day    MEDICATIONS  (PRN):    Vital Signs Last 24 Hrs  T(C): 36.6 (27 Mar 2024 12:21), Max: 36.8 (26 Mar 2024 20:20)  T(F): 97.8 (27 Mar 2024 12:21), Max: 98.2 (26 Mar 2024 20:20)  HR: 91 (27 Mar 2024 16:57) (78 - 97)  BP: 114/73 (27 Mar 2024 16:57) (114/67 - 146/78)  BP(mean): --  RR: 18 (27 Mar 2024 12:21) (17 - 20)  SpO2: 98% (27 Mar 2024 16:57) (96% - 99%)    Parameters below as of 27 Mar 2024 16:57  Patient On (Oxygen Delivery Method): room air        I&O's Summary    26 Mar 2024 07:01  -  27 Mar 2024 07:00  --------------------------------------------------------  IN: 360 mL / OUT: 0 mL / NET: 360 mL    27 Mar 2024 07:01  -  27 Mar 2024 17:50  --------------------------------------------------------  IN: 720 mL / OUT: 2000 mL / NET: -1280 mL          Physical Exam:   GENERAL: NAD, well-groomed, well-developed  HEENT: CASEY/   Atraumatic, Normocephalic  ENMT: No tonsillar erythema, exudates, or enlargement; Moist mucous membranes, Good dentition, No lesions  NECK: Supple, No JVD, Normal thyroid  CHEST/LUNG: Clear to auscultaion, ; No rales, rhonchi, wheezing, or rubs  CVS: Regular rate and rhythm; No murmurs, rubs, or gallops  GI: : Soft, Nontender, Nondistended; Bowel sounds present  NERVOUS SYSTEM:  Alert & Oriented X3  EXTREMITIES:  2+ Peripheral Pulses, No clubbing, cyanosis, or edema  LYMPH: No lymphadenopathy noted  SKIN: No rashes or lesions  ENDOCRINOLOGY: No Thyromegaly  PSYCH: Appropriate    Labs:                              9.6    8.27  )-----------( 140      ( 27 Mar 2024 10:31 )             31.1                         11.0   10.63 )-----------( 148      ( 26 Mar 2024 07:59 )             35.8                         10.6   9.11  )-----------( 127      ( 25 Mar 2024 07:20 )             35.0                         11.2   9.96  )-----------( 134      ( 24 Mar 2024 07:01 )             35.7     03-27    137  |  96  |  36<H>  ----------------------------<  147<H>  4.3   |  18<L>  |  6.86<H>  03-26    136  |  94<L>  |  22  ----------------------------<  107<H>  4.4   |  22  |  5.40<H>  03-25    135  |  95<L>  |  51<H>  ----------------------------<  100<H>  4.1   |  17<L>  |  8.45<H>  03-24    139  |  97  |  35<H>  ----------------------------<  95  4.0   |  21<L>  |  6.98<H>    Ca    9.6      27 Mar 2024 10:31  Ca    10.2      26 Mar 2024 07:59      CAPILLARY BLOOD GLUCOSE            PT/INR - ( 26 Mar 2024 07:59 )   PT: 11.9 sec;   INR: 1.14 ratio         PTT - ( 26 Mar 2024 07:59 )  PTT:38.0 sec  Urinalysis Basic - ( 27 Mar 2024 10:31 )    Color: x / Appearance: x / SG: x / pH: x  Gluc: 147 mg/dL / Ketone: x  / Bili: x / Urobili: x   Blood: x / Protein: x / Nitrite: x   Leuk Esterase: x / RBC: x / WBC x   Sq Epi: x / Non Sq Epi: x / Bacteria: x            RECENT CULTURES:  03-22 @ 11:26 .Stool Feces                No Protozoa seen by trichrome stain  No Helminths or Protozoa seen in formalin concentrate  performed by iodine stain  (routine O+P not evaluated for Microsporidia,  Cryptosporidia or Cyclospora)  One negative sample does not necessarily rule  out the presence of a parasitic infection.    rad< from: Xray Chest 1 View- PORTABLE-Routine (Xray Chest 1 View- PORTABLE-Routine in AM.) (03.21.24 @ 10:31) >    ACC: [id number] EXAM:  XR CHEST PORTABLE ROUTINE 1V   ORDERED BY: EDI BAIN     PROCEDURE DATE:  03/21/2024          INTERPRETATION:  DATE OF STUDY: 3/21/24    PRIOR: 3/18/24    CLINICAL INDICATION: SOB    TECHNIQUE: AP radiograph of the chest.    FINDINGS:  Redemonstration of left axillary and left subclavian/brachiocephalic   stents.  Right-sided chest wall catheter with tip terminating in the region of the   superior cavoatrial junction.  Difficult to assess heart size on this AP projection.  Interval improvement in pulmonary edema changes.  No sizable pleural effusion. No pneumothorax.    IMPRESSION:  Interval improvement in pulmonary edema changes.    --- End of Report ---            NORBERT WALLER MD; Attending Radiologist  This document has been electronically signed. Mar 22 2024 10:39AM    < end of copied text >        RESPIRATORY CULTURES:          Studies  Chest X-RAY  CT SCAN Chest   Venous Dopplers: LE:   CT Abdomen  Others

## 2024-03-27 NOTE — PROGRESS NOTE ADULT - NSPROGADDITIONALINFOA_GEN_ALL_CORE
for dc today
seems to be doing pretty good:  daniel acp  and seems stable for procedure tomorrow
dw pt and daughter at bedside
seems to be doing pretty good:  daniel acp  and seems stable for procedure tomorrow
seems to be doing pretty good:  daniel acp

## 2024-03-27 NOTE — DISCHARGE NOTE PROVIDER - HOSPITAL COURSE
HPI:  63 year old female with hx of polycythemia, ESRD, on HD M/W/F presenting with shortness of breath.   Patient states she has had progressively worsening shortness of breath over the last 2 weeks. Last HD Friday.   Patient woke up this morning, urinated and subsequently had acute onset shortness of breath.   Patient denies chest pain, vomiting, abdominal pain, fevers, coughing, sick contacts.    In thee d patient had CT chest shows Fluid overload with small bilateral pleural effusions with or without   superimposed infectious/inflammatory process. Partially imaged   multiseptated cystic regions right lower lobe (18 Mar 2024 16:43)    Hospital Course: Admitted with AHRF d/t volume overload and PNA. In the ED, pt negative for Covid, flu, RSV. Labs significant for WBC: 18.09, initial bicarb of 19, redraw of 17, initial trop of 26, repeat of 35, anion gap of 17, procal of 0.76, [place] [address]. CT Head negative for acute infarction or hemorrhage, CT chest notable for fluid overload status with small b/l pleural effusions. CXR demonstrates increased interstitial markings and peribronchial cuffing consistent with pulmonary edema. Started on Ceftriaxone and azithromycin for PNA, completed course of IV CFTX. Pt admitted to MICU for acute hypoxemic respiratory failure likely 2/2 to fluid overload status, possibly PNA. Pt had 3.3L removed via urgent HD in MICU w/ improvement in SOB. Pt was discontinued off of zosyn 3/18 and started on CTX 3/19-3/25. GI was consulted as pt was pending outpt EGD, s/p EGD for variceal screening and colonoscopy for diarrhea evaluation. EGD showing type 1 isolated gastric varices (IGV1, varices located in the fundus), without bleeding. Colonoscopy showing One 5 mm polyp in the sigmoid colon, removed with a hot snare which was resected and retrieved, also with Internal hemorrhoids, biopsies were taken with a cold forceps for histology in the sigmoid colon, in the descending colon, in the transverse colon and in the ascending colon. Awaiting pathology result and possibly will need a  repeat colonoscopy for surveillance based on pathology results. Patient to follow up with GI upon discharge for results and possibly repeat colonoscopy. Patient was also evaluated by [facility] for polycythemia vera w/ secondary myelofibrosis, and splenomegaly. Patient was on Jakafi and Danazol, now on Ojjaara and hydroxyurea. Reported side effects of Ojjaara have included increased risk of infections and edema which may have contributed to presentation of PNA and fluid overload. Ojjaara has also been reported with increased risk of cardiovascular events - which was held inpatient, hydroxyurea was also held - blood counts have been stable. Ojjaara to be resumed on discharge.     Patient is medically clear for discharge by Dr. Rodriguez to home with HD. Outpatient follow up with PCP, GI   Med recc and clearance discussed with attending    Important Medication Changes and Reason:    Active or Pending Issues Requiring Follow-up:  Follow up with GI   Follow up with hematology  Follow up with renal  Follow up with PCP    Advanced Directives:   [ x] Full code  [ ] DNR  [ ] Hospice    Discharge Diagnoses:  PNA  polycythemia vera  splenomegaly  AHRF         HPI:  63 year old female with hx of polycythemia, ESRD, on HD M/W/F presenting with shortness of breath.   Patient states she has had progressively worsening shortness of breath over the last 2 weeks. Last HD Friday.   Patient woke up this morning, urinated and subsequently had acute onset shortness of breath.   Patient denies chest pain, vomiting, abdominal pain, fevers, coughing, sick contacts.    In thee d patient had CT chest shows Fluid overload with small bilateral pleural effusions with or without   superimposed infectious/inflammatory process. Partially imaged   multiseptated cystic regions right lower lobe (18 Mar 2024 16:43)    Hospital Course: Admitted with AHRF d/t volume overload and PNA. In the ED, pt negative for Covid, flu, RSV. Labs significant for WBC: 18.09, initial bicarb of 19, redraw of 17, initial trop of 26, repeat of 35, anion gap of 17, procal of 0.76, [place]NP of 8[address]. CT Head negative for acute infarction or hemorrhage, CT chest notable for fluid overload status with small b/l pleural effusions. CXR demonstrates increased interstitial markings and peribronchial cuffing consistent with pulmonary edema. Started on Ceftriaxone and azithromycin for PNA, completed course of IV CFTX. Pt admitted to MICU for acute hypoxemic respiratory failure likely 2/2 to fluid overload status, possibly PNA. Pt had 3.3L removed via urgent HD in MICU w/ improvement in SOB. Pt was discontinued off of zosyn 3/18 and started on CTX 3/19-3/25. GI was consulted as pt was pending outpt EGD, s/p EGD for variceal screening and colonoscopy for diarrhea evaluation. EGD showing type 1 isolated gastric varices (IGV1, varices located in the fundus), without bleeding. Colonoscopy showing One 5 mm polyp in the sigmoid colon, removed with a hot snare which was resected and retrieved, also with Internal hemorrhoids, biopsies were taken with a cold forceps for histology in the sigmoid colon, in the descending colon, in the transverse colon and in the ascending colon. Awaiting pathology result and possibly will need a  repeat colonoscopy for surveillance based on pathology results. Patient to follow up with GI upon discharge for results and possibly repeat colonoscopy. Patient was also evaluated by [facility] for polycythemia vera w/ secondary myelofibrosis, and splenomegaly. Patient was on Jakafi and Danazol, now on Ojjaara and hydroxyurea. Reported side effects of Ojjaara have included increased risk of infections and edema which may have contributed to presentation of PNA and fluid overload. Ojjaara has also been reported with increased risk of cardiovascular events - which was held inpatient, hydroxyurea was also held - blood counts have been stable. Ojjaara to be resumed on discharge.     Patient is medically clear for discharge by Dr. Rodriguez to home with HD. Outpatient follow up with PCP, GI, Hematologist   Med recc and clearance discussed with attending    Important Medication Changes and Reason:    Active or Pending Issues Requiring Follow-up:  Follow up with GI   Follow up with hematology- Dr. PAEZ at [facility]  Follow up with renal  Follow up with PCP    Advanced Directives:   [ x] Full code  [ ] DNR  [ ] Hospice    Discharge Diagnoses:  PNA  polycythemia vera  splenomegaly  AHRF

## 2024-03-27 NOTE — DISCHARGE NOTE PROVIDER - NSFOLLOWUPCLINICSTOKEN_GEN_ALL_ED_FT
764848:1 week|| ||00\01||False; 867198:1 week|| ||00\01||False;609697:1-3 days|| ||00\01||False;893867:Routine|| ||00\01||False;

## 2024-03-27 NOTE — DISCHARGE NOTE PROVIDER - NSDCMRMEDTOKEN_GEN_ALL_CORE_FT
carvedilol 3.125 mg oral tablet: 1 tab(s) orally every 12 hours  famotidine 20 mg oral tablet: 1 tab(s) orally once a day  hydroxyurea 500 mg oral capsule: 1 cap(s) orally 3 times a week  Ojjaara 150 mg oral tablet: 1 tab(s) orally once a day  Carmen-Ben Rx oral tablet: 1 tab(s) orally once a day  ursodiol 300 mg oral capsule: 1 cap(s) orally 2 times a day   carvedilol 3.125 mg oral tablet: 1 tab(s) orally every 12 hours  famotidine 20 mg oral tablet: 1 tab(s) orally once a day  Ojjaara 150 mg oral tablet: 1 tab(s) orally once a day  Carmen-Ben Rx oral tablet: 1 tab(s) orally once a day  ursodiol 300 mg oral capsule: 1 cap(s) orally 2 times a day

## 2024-03-27 NOTE — PROGRESS NOTE ADULT - PROBLEM SELECTOR PROBLEM 1
ESRD on hemodialysis
Acute respiratory failure with hypoxia
ESRD on hemodialysis
ESRD on hemodialysis
Acute respiratory failure with hypoxia

## 2024-03-27 NOTE — PROGRESS NOTE ADULT - PROVIDER SPECIALTY LIST ADULT
Gastroenterology
Gastroenterology
Hospitalist
Hospitalist
Nephrology-Cardiorenal
Hospitalist
Hospitalist
MICU
Vascular Surgery
Hospitalist
Nephrology
Nephrology
Pulmonology
Pulmonology
Hospitalist
Nephrology-Cardiorenal
Pulmonology
Pulmonology
Nephrology
Pulmonology

## 2024-03-27 NOTE — DISCHARGE NOTE PROVIDER - NSDCCPCAREPLAN_GEN_ALL_CORE_FT
PRINCIPAL DISCHARGE DIAGNOSIS  Diagnosis: Acute pulmonary edema  Assessment and Plan of Treatment: You were admitted for Acute respiratory failure with hypoxia which was likely due to fluid overload. You had an urgent dialysis session and were admitted to MICU.  You were also found to have pneumonia and were treated with IV antibiotics. Your hypoxia now resolved, and you are tolerating room air. Follow up with your PCP upon discharge      SECONDARY DISCHARGE DIAGNOSES  Diagnosis: Polycythemia  Assessment and Plan of Treatment: Reported side effects of Ojjaara have included increased risk of infections and edema which may have contributed to presentation of pneumonia and fluid overload. Continue to take it once you are discharged from the hospital. Follow up with your Hemtologist upon discharge.    Diagnosis: ESRD on hemodialysis  Assessment and Plan of Treatment: Continue with your HD sessions, continue to follow up with your nephrologist.    Diagnosis: Mucoid diarrhea  Assessment and Plan of Treatment: GI was consulted as pt was pending outpt EGD, s/p EGD for variceal screening and colonoscopy for diarrhea evaluation. EGD showing type 1 isolated gastric varices (IGV1, varices located in the fundus), without bleeding. Colonoscopy showing One 5 mm polyp in the sigmoid colon, removed with a hot snare which was resected and retrieved, also with Internal hemorrhoids, biopsies were taken with a cold forceps for histology in the sigmoid colon, in the descending colon, in the transverse colon and in the ascending colon. Awaiting pathology result and possibly will need a  repeat colonoscopy for surveillance based on pathology results. Patient to follow up with GI upon discharge for results and possibly repeat colonoscopy.

## 2024-03-28 ENCOUNTER — OUTPATIENT (OUTPATIENT)
Dept: OUTPATIENT SERVICES | Facility: HOSPITAL | Age: 64
LOS: 1 days | End: 2024-03-28
Payer: SELF-PAY

## 2024-03-28 ENCOUNTER — APPOINTMENT (OUTPATIENT)
Dept: INTERNAL MEDICINE | Facility: CLINIC | Age: 64
End: 2024-03-28
Payer: COMMERCIAL

## 2024-03-28 ENCOUNTER — OUTPATIENT (OUTPATIENT)
Dept: OUTPATIENT SERVICES | Facility: HOSPITAL | Age: 64
LOS: 1 days | End: 2024-03-28
Payer: MEDICAID

## 2024-03-28 VITALS
BODY MASS INDEX: 21.9 KG/M2 | HEART RATE: 88 BPM | SYSTOLIC BLOOD PRESSURE: 118 MMHG | DIASTOLIC BLOOD PRESSURE: 70 MMHG | OXYGEN SATURATION: 98 % | WEIGHT: 119 LBS | HEIGHT: 62 IN

## 2024-03-28 DIAGNOSIS — I10 ESSENTIAL (PRIMARY) HYPERTENSION: ICD-10-CM

## 2024-03-28 DIAGNOSIS — Z99.2 DEPENDENCE ON RENAL DIALYSIS: Chronic | ICD-10-CM

## 2024-03-28 DIAGNOSIS — T85.898A OTHER SPECIFIED COMPLICATION OF OTHER INTERNAL PROSTHETIC DEVICES, IMPLANTS AND GRAFTS, INITIAL ENCOUNTER: Chronic | ICD-10-CM

## 2024-03-28 DIAGNOSIS — S40.022A CONTUSION OF LEFT UPPER ARM, INITIAL ENCOUNTER: ICD-10-CM

## 2024-03-28 PROCEDURE — G0463: CPT

## 2024-03-28 PROCEDURE — 99213 OFFICE O/P EST LOW 20 MIN: CPT | Mod: GC

## 2024-03-28 RX ORDER — ONDANSETRON 4 MG/1
4 TABLET ORAL
Qty: 12 | Refills: 2 | Status: DISCONTINUED | COMMUNITY
Start: 2022-03-16 | End: 2024-03-28

## 2024-03-28 RX ORDER — LOPERAMIDE HYDROCHLORIDE 2 MG/1
2 TABLET ORAL
Qty: 1 | Refills: 0 | Status: DISCONTINUED | COMMUNITY
Start: 2023-12-29 | End: 2024-03-28

## 2024-03-28 RX ORDER — POLYETHYLENE GLYCOL 3350 AND ELECTROLYTES WITH LEMON FLAVOR 236; 22.74; 6.74; 5.86; 2.97 G/4L; G/4L; G/4L; G/4L; G/4L
236 POWDER, FOR SOLUTION ORAL
Qty: 1 | Refills: 0 | Status: DISCONTINUED | COMMUNITY
Start: 2024-03-12 | End: 2024-03-28

## 2024-03-28 RX ORDER — SIMETHICONE 125 MG
125 CAPSULE ORAL 4 TIMES DAILY
Qty: 56 | Refills: 0 | Status: DISCONTINUED | COMMUNITY
Start: 2023-12-29 | End: 2024-03-28

## 2024-03-29 PROBLEM — S40.022A CONTUSION OF ARM, LEFT, INITIAL ENCOUNTER: Status: ACTIVE | Noted: 2024-03-29

## 2024-03-29 PROCEDURE — 86787 VARICELLA-ZOSTER ANTIBODY: CPT

## 2024-03-29 PROCEDURE — 86480 TB TEST CELL IMMUN MEASURE: CPT

## 2024-03-29 PROCEDURE — 86762 RUBELLA ANTIBODY: CPT

## 2024-03-29 PROCEDURE — 86774 TETANUS ANTIBODY: CPT

## 2024-03-29 PROCEDURE — 86765 RUBEOLA ANTIBODY: CPT

## 2024-03-29 PROCEDURE — 86735 MUMPS ANTIBODY: CPT

## 2024-03-29 NOTE — HISTORY OF PRESENT ILLNESS
[FreeTextEntry1] : Follow up  [de-identified] : 63 year old female with hx of polycythemia, ESRD, on HD M/W/F presenting for an acute visit for a bruise on her right arm after dialysis.   Patient and her daughter report that patient last had dialysis on 3/27. Her dialysis session went well but she did report that the dialysis nurse was possibly leaning on her forearm during some of the session. Patient reports that a few hours after the dialysis session was complete, she noticed that her forearm was bruised and painful. She has not taken anything for the pain. Her next dialysis session is scheduled for tomorrow. Patient denies fevers, chills, chest pain, shortness of breath, abdominal pain, diarrhea, n/v.

## 2024-03-29 NOTE — END OF VISIT
[] : Resident [FreeTextEntry3] : Pt reports pain and bruising of left arm after HD yesterday. On exam, superficial bruising with ? of palpable thrombosed vein along volar aspect of left forearm, not near the AV fistula site which his located at distal upper arm, with palpable thrill and no surrounding erythema. No evidence for infection, just a bruise, maybe a thrombosed vein, suggested warm compresses and follow up.

## 2024-03-29 NOTE — PHYSICAL EXAM
[Normal] : normal rate, regular rhythm, normal S1 and S2 and no murmur heard [Soft] : abdomen soft [Non Tender] : non-tender [Non-distended] : non-distended [de-identified] : left forearm with superficial bruising and some tenderness to palpation, no erythema or swelling  [de-identified] : left arm AV fistula with palpable thrill

## 2024-03-29 NOTE — ASSESSMENT
[FreeTextEntry1] : 63 year old female with hx of polycythemia, ESRD, on HD M/W/F presenting for an acute visit for a bruise on her right arm after dialysis.  #Left forearm contusion  - patient coming in for a left forearm contusion after dialysis session  - likely secondary to trauma as patient reports dialysis nurse may have leaned on her arm during session  - advised patient to use warm compresses and reassured her that contusion should heal over the next fee days  - AV fistula with palpable thrill, advised patient to continue dialysis as scheduled   RTC for CPE  Opal Stallworth MD PGY3 Internal Medicine d/w Dr. Blair

## 2024-04-01 ENCOUNTER — NON-APPOINTMENT (OUTPATIENT)
Age: 64
End: 2024-04-01

## 2024-04-01 LAB
MEV IGG FLD QL IA: >300 AU/ML
MEV IGG+IGM SER-IMP: POSITIVE
MUV AB SER-ACNC: POSITIVE
MUV IGG SER QL IA: 76.5 AU/ML
RUBV IGG FLD-ACNC: 3.5 INDEX
RUBV IGG SER-IMP: POSITIVE
SURGICAL PATHOLOGY STUDY: SIGNIFICANT CHANGE UP
VZV AB TITR SER: POSITIVE
VZV IGG SER IF-ACNC: >4000 INDEX

## 2024-04-02 LAB
M TB IFN-G BLD-IMP: NEGATIVE
QUANTIFERON TB PLUS MITOGEN MINUS NIL: 0.88 IU/ML
QUANTIFERON TB PLUS NIL: 0.04 IU/ML
QUANTIFERON TB PLUS TB1 MINUS NIL: 0.03 IU/ML
QUANTIFERON TB PLUS TB2 MINUS NIL: 0.04 IU/ML

## 2024-04-03 DIAGNOSIS — S40.022A CONTUSION OF LEFT UPPER ARM, INITIAL ENCOUNTER: ICD-10-CM

## 2024-04-03 LAB — C TETANI IGG SER-ACNC: 0.81 IU/ML

## 2024-04-03 RX ORDER — ONDANSETRON 4 MG/1
4 TABLET ORAL
Qty: 30 | Refills: 2 | Status: ACTIVE | COMMUNITY
Start: 2024-04-03 | End: 1900-01-01

## 2024-04-04 ENCOUNTER — APPOINTMENT (OUTPATIENT)
Dept: HEMATOLOGY ONCOLOGY | Facility: CLINIC | Age: 64
End: 2024-04-04
Payer: COMMERCIAL

## 2024-04-04 ENCOUNTER — OUTPATIENT (OUTPATIENT)
Dept: OUTPATIENT SERVICES | Facility: HOSPITAL | Age: 64
LOS: 1 days | End: 2024-04-04
Payer: SELF-PAY

## 2024-04-04 ENCOUNTER — RESULT REVIEW (OUTPATIENT)
Age: 64
End: 2024-04-04

## 2024-04-04 ENCOUNTER — APPOINTMENT (OUTPATIENT)
Dept: INTERNAL MEDICINE | Facility: CLINIC | Age: 64
End: 2024-04-04
Payer: COMMERCIAL

## 2024-04-04 VITALS
TEMPERATURE: 98.5 F | OXYGEN SATURATION: 99 % | WEIGHT: 109.33 LBS | HEART RATE: 92 BPM | BODY MASS INDEX: 20.12 KG/M2 | RESPIRATION RATE: 16 BRPM | DIASTOLIC BLOOD PRESSURE: 76 MMHG | HEIGHT: 62 IN | SYSTOLIC BLOOD PRESSURE: 119 MMHG

## 2024-04-04 VITALS
HEART RATE: 100 BPM | DIASTOLIC BLOOD PRESSURE: 70 MMHG | HEIGHT: 62 IN | WEIGHT: 109 LBS | OXYGEN SATURATION: 98 % | BODY MASS INDEX: 20.06 KG/M2 | SYSTOLIC BLOOD PRESSURE: 120 MMHG

## 2024-04-04 DIAGNOSIS — I10 ESSENTIAL (PRIMARY) HYPERTENSION: ICD-10-CM

## 2024-04-04 DIAGNOSIS — I77.0 ARTERIOVENOUS FISTULA, ACQUIRED: Chronic | ICD-10-CM

## 2024-04-04 DIAGNOSIS — Z99.2 DEPENDENCE ON RENAL DIALYSIS: Chronic | ICD-10-CM

## 2024-04-04 DIAGNOSIS — N18.6 END STAGE RENAL DISEASE: ICD-10-CM

## 2024-04-04 DIAGNOSIS — T85.898A OTHER SPECIFIED COMPLICATION OF OTHER INTERNAL PROSTHETIC DEVICES, IMPLANTS AND GRAFTS, INITIAL ENCOUNTER: Chronic | ICD-10-CM

## 2024-04-04 LAB
BASOPHILS # BLD AUTO: 0.11 K/UL — SIGNIFICANT CHANGE UP (ref 0–0.2)
BASOPHILS NFR BLD AUTO: 0.8 % — SIGNIFICANT CHANGE UP (ref 0–2)
EOSINOPHIL # BLD AUTO: 0.2 K/UL — SIGNIFICANT CHANGE UP (ref 0–0.5)
EOSINOPHIL NFR BLD AUTO: 1.5 % — SIGNIFICANT CHANGE UP (ref 0–6)
HCT VFR BLD CALC: 29.5 % — LOW (ref 34.5–45)
HGB BLD-MCNC: 9.4 G/DL — LOW (ref 11.5–15.5)
IMM GRANULOCYTES NFR BLD AUTO: 2.1 % — HIGH (ref 0–0.9)
LYMPHOCYTES # BLD AUTO: 0.82 K/UL — LOW (ref 1–3.3)
LYMPHOCYTES # BLD AUTO: 6.2 % — LOW (ref 13–44)
MCHC RBC-ENTMCNC: 31.8 PG — SIGNIFICANT CHANGE UP (ref 27–34)
MCHC RBC-ENTMCNC: 31.9 G/DL — LOW (ref 32–36)
MCV RBC AUTO: 99.7 FL — SIGNIFICANT CHANGE UP (ref 80–100)
MONOCYTES # BLD AUTO: 0.51 K/UL — SIGNIFICANT CHANGE UP (ref 0–0.9)
MONOCYTES NFR BLD AUTO: 3.8 % — SIGNIFICANT CHANGE UP (ref 2–14)
NEUTROPHILS # BLD AUTO: 11.33 K/UL — HIGH (ref 1.8–7.4)
NEUTROPHILS NFR BLD AUTO: 85.6 % — HIGH (ref 43–77)
NRBC # BLD: 0 /100 WBCS — SIGNIFICANT CHANGE UP (ref 0–0)
PLATELET # BLD AUTO: 145 K/UL — LOW (ref 150–400)
RBC # BLD: 2.96 M/UL — LOW (ref 3.8–5.2)
RBC # FLD: 21 % — HIGH (ref 10.3–14.5)
RETICS #: 153.3 K/UL — HIGH (ref 25–125)
RETICS/RBC NFR: 5.2 % — HIGH (ref 0.5–2.5)
WBC # BLD: 13.25 K/UL — HIGH (ref 3.8–10.5)
WBC # FLD AUTO: 13.25 K/UL — HIGH (ref 3.8–10.5)

## 2024-04-04 PROCEDURE — 99214 OFFICE O/P EST MOD 30 MIN: CPT

## 2024-04-04 PROCEDURE — G0463: CPT

## 2024-04-04 PROCEDURE — 99212 OFFICE O/P EST SF 10 MIN: CPT | Mod: GE

## 2024-04-04 NOTE — HISTORY OF PRESENT ILLNESS
[de-identified] : Philippet on Ojarra every day prior to the hospital stay, but was off the Ojjaara for 3 weeks despite the last set of instructions to restart it.  Hg ended up being ok at 9.9g/dl so we can  sstay off of it in this case, the Ojjaara was intended for the anemia Hydroxyurea was only being taken every other day after dialysis.    Overall given the current circumstance recommend patient stay off the ojjaara given Hg stable 9.9, but increase the hydroxyurea to 500mg daily not 3 tiems a week.   Follow up in 1 month we can irestart Ojjaara 150mg for the  anemia, or if splenomegaly remains the larger concern, restart the federation.    Paient having some dizziness, when she gets up and when she moves around.   Does not syncopate or have presyncope, has spinning vertigo.

## 2024-04-05 PROBLEM — I10 HYPERTENSION: Status: ACTIVE | Noted: 2017-03-06

## 2024-04-05 PROBLEM — N18.6 END-STAGE RENAL DISEASE: Status: ACTIVE | Noted: 2018-12-12

## 2024-04-05 NOTE — PHYSICAL EXAM
[No Acute Distress] : no acute distress [Well Nourished] : well nourished [Well Developed] : well developed [Well-Appearing] : well-appearing [Normal Sclera/Conjunctiva] : normal sclera/conjunctiva [EOMI] : extraocular movements intact [No Respiratory Distress] : no respiratory distress  [No Accessory Muscle Use] : no accessory muscle use

## 2024-04-05 NOTE — PLAN
[FreeTextEntry1] : # Follow up labs - discussed labs w/ patient - provided copies; she is in the process of getting citizenship  RTC PRN Discussed w/ Dr Hanson

## 2024-04-05 NOTE — HISTORY OF PRESENT ILLNESS
[de-identified] : 63 year old female with hx of polycythemia, ESRD, on HD M/W/F presented to clinic for follow up of labs. No acute complaints today.  Discussed labs with patient and provided copies of results.

## 2024-04-08 ENCOUNTER — APPOINTMENT (OUTPATIENT)
Dept: INTERNAL MEDICINE | Facility: CLINIC | Age: 64
End: 2024-04-08

## 2024-04-11 ENCOUNTER — OUTPATIENT (OUTPATIENT)
Dept: OUTPATIENT SERVICES | Facility: HOSPITAL | Age: 64
LOS: 1 days | End: 2024-04-11

## 2024-04-11 ENCOUNTER — APPOINTMENT (OUTPATIENT)
Dept: GASTROENTEROLOGY | Facility: CLINIC | Age: 64
End: 2024-04-11
Payer: COMMERCIAL

## 2024-04-11 VITALS
DIASTOLIC BLOOD PRESSURE: 69 MMHG | BODY MASS INDEX: 19.88 KG/M2 | HEIGHT: 62 IN | HEART RATE: 86 BPM | WEIGHT: 108 LBS | SYSTOLIC BLOOD PRESSURE: 110 MMHG | OXYGEN SATURATION: 99 %

## 2024-04-11 DIAGNOSIS — K31.84 GASTROPARESIS: ICD-10-CM

## 2024-04-11 DIAGNOSIS — Z99.2 DEPENDENCE ON RENAL DIALYSIS: Chronic | ICD-10-CM

## 2024-04-11 DIAGNOSIS — K74.60 UNSPECIFIED CIRRHOSIS OF LIVER: ICD-10-CM

## 2024-04-11 DIAGNOSIS — I86.4 GASTRIC VARICES: ICD-10-CM

## 2024-04-11 DIAGNOSIS — I77.0 ARTERIOVENOUS FISTULA, ACQUIRED: Chronic | ICD-10-CM

## 2024-04-11 DIAGNOSIS — R11.0 NAUSEA: ICD-10-CM

## 2024-04-11 DIAGNOSIS — T85.898A OTHER SPECIFIED COMPLICATION OF OTHER INTERNAL PROSTHETIC DEVICES, IMPLANTS AND GRAFTS, INITIAL ENCOUNTER: Chronic | ICD-10-CM

## 2024-04-11 PROCEDURE — ZZZZZ: CPT | Mod: GC

## 2024-04-11 NOTE — ASSESSMENT
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   #Nausea Ongoing reports of daily nausea w/ retching at times but no true vomiting. EGD on 3/26/24 w/o identified pathology. Ddx includes gastoparesis (poor po intake, early satiety, ? in setting prior covid infection) vs. functional disorder vs. Med SE (appears that hydroxyurea can have nausea as SE, is currently off of her Ojjaara which may have contributed). Unlikely cyclic vomiting, structural abnormality as does not meet criteria and EGD was normal.  Clinically improving.  Recommendations:  -Gastric emptying study -Continue w/ daily omeprazole; instructed to have family try and buy at [employer] given she has sliding scale -If GES neg, can consider treatment for functional disorder  #Diarrhea Hx fo reported diarrhea for which onc had altered her meds in February. Recent colon 3/2024 w/ 1TA and path neg for microscopic colitis. Overall appears to have improved, now w/ 3 BMs/day bristol score 4.  -No further w/u -Consider stool studies, sudan stain etc. if recurrs  #Regenerative hyperplasia of liver c/b GOV1 Again noted on EGD 3/2024. No bleed in past. Does not appear to have splenci infarct in past. Not banded.  -Monitor clinically -C/w mildred Leo GI/Hep fellow

## 2024-04-11 NOTE — END OF VISIT
[] : Fellow [FreeTextEntry3] : As modified and discussed with patient MD REUBEN Gonzalez Banner Associate Professor of Medicine [facility]

## 2024-04-11 NOTE — REASON FOR VISIT
[Follow-up] : a follow-up of an existing diagnosis [Spouse] : spouse [FreeTextEntry1] : nausea, diarrhea, gastric varices

## 2024-04-11 NOTE — DISCHARGE NOTE PROVIDER - DISCHARGE DATE
13-Aug-2021

## 2024-04-16 DIAGNOSIS — N18.6 END STAGE RENAL DISEASE: ICD-10-CM

## 2024-04-16 LAB
FERRITIN SERPL-MCNC: 1244 NG/ML
FOLATE SERPL-MCNC: >20 NG/ML
IRON SATN MFR SERPL: 50 %
IRON SERPL-MCNC: 80 UG/DL
TIBC SERPL-MCNC: 159 UG/DL
UIBC SERPL-MCNC: 79 UG/DL
VIT B12 SERPL-MCNC: 1730 PG/ML

## 2024-04-24 ENCOUNTER — APPOINTMENT (OUTPATIENT)
Dept: INTERNAL MEDICINE | Facility: CLINIC | Age: 64
End: 2024-04-24
Payer: COMMERCIAL

## 2024-04-24 ENCOUNTER — OUTPATIENT (OUTPATIENT)
Dept: OUTPATIENT SERVICES | Facility: HOSPITAL | Age: 64
LOS: 1 days | End: 2024-04-24
Payer: SELF-PAY

## 2024-04-24 VITALS
HEIGHT: 62 IN | SYSTOLIC BLOOD PRESSURE: 140 MMHG | HEART RATE: 85 BPM | OXYGEN SATURATION: 99 % | BODY MASS INDEX: 20.61 KG/M2 | WEIGHT: 112 LBS | DIASTOLIC BLOOD PRESSURE: 70 MMHG

## 2024-04-24 DIAGNOSIS — T85.898A OTHER SPECIFIED COMPLICATION OF OTHER INTERNAL PROSTHETIC DEVICES, IMPLANTS AND GRAFTS, INITIAL ENCOUNTER: Chronic | ICD-10-CM

## 2024-04-24 DIAGNOSIS — I77.0 ARTERIOVENOUS FISTULA, ACQUIRED: Chronic | ICD-10-CM

## 2024-04-24 DIAGNOSIS — Z00.00 ENCOUNTER FOR GENERAL ADULT MEDICAL EXAMINATION W/OUT ABNORMAL FINDINGS: ICD-10-CM

## 2024-04-24 DIAGNOSIS — Z99.2 DEPENDENCE ON RENAL DIALYSIS: Chronic | ICD-10-CM

## 2024-04-24 DIAGNOSIS — I10 ESSENTIAL (PRIMARY) HYPERTENSION: ICD-10-CM

## 2024-04-24 DIAGNOSIS — K31.84 GASTROPARESIS: ICD-10-CM

## 2024-04-24 PROCEDURE — 99213 OFFICE O/P EST LOW 20 MIN: CPT | Mod: GC

## 2024-04-24 PROCEDURE — G0463: CPT

## 2024-04-24 NOTE — HISTORY OF PRESENT ILLNESS
[Spouse] : spouse [FreeTextEntry1] : Follow-up [de-identified] : 63F with a PMHx of ESRD, PV, HTN, noncirrhotic portal HTN presenting for wellness check. States she is doing well. Pt denies fevers/chills, HA, dizziness, cough, sore throat, rhinorrhea, CP, palpitations, SOB, abdominal pain, n/v, bowel/bladder changes, numbness/tingling, or fatigue. No other acute complaints reported at this time.  She inquires about refills of Zantac and Hydroxyurea today.

## 2024-04-24 NOTE — PHYSICAL EXAM
[No Acute Distress] : no acute distress [Well Developed] : well developed [Well Nourished] : well nourished [Well-Appearing] : well-appearing [Normal Sclera/Conjunctiva] : normal sclera/conjunctiva [PERRL] : pupils equal round and reactive to light [EOMI] : extraocular movements intact [Normal Outer Ear/Nose] : the outer ears and nose were normal in appearance [Normal Oropharynx] : the oropharynx was normal [No JVD] : no jugular venous distention [No Lymphadenopathy] : no lymphadenopathy [Supple] : supple [Thyroid Normal, No Nodules] : the thyroid was normal and there were no nodules present [No Respiratory Distress] : no respiratory distress  [No Accessory Muscle Use] : no accessory muscle use [Clear to Auscultation] : lungs were clear to auscultation bilaterally [Normal Rate] : normal rate  [Regular Rhythm] : with a regular rhythm [Normal S1, S2] : normal S1 and S2 [No Murmur] : no murmur heard [No Carotid Bruits] : no carotid bruits [No Abdominal Bruit] : a ~M bruit was not heard ~T in the abdomen [No Varicosities] : no varicosities [Pedal Pulses Present] : the pedal pulses are present [No Edema] : there was no peripheral edema [No Palpable Aorta] : no palpable aorta [No Extremity Clubbing/Cyanosis] : no extremity clubbing/cyanosis [Soft] : abdomen soft [Non Tender] : non-tender [Non-distended] : non-distended [No Masses] : no abdominal mass palpated [No HSM] : no HSM [Normal Bowel Sounds] : normal bowel sounds [Normal Posterior Cervical Nodes] : no posterior cervical lymphadenopathy [Normal Anterior Cervical Nodes] : no anterior cervical lymphadenopathy [No CVA Tenderness] : no CVA  tenderness [No Spinal Tenderness] : no spinal tenderness [No Joint Swelling] : no joint swelling [Grossly Normal Strength/Tone] : grossly normal strength/tone [No Rash] : no rash [Coordination Grossly Intact] : coordination grossly intact [No Focal Deficits] : no focal deficits [Normal Gait] : normal gait [Normal Affect] : the affect was normal [Normal Insight/Judgement] : insight and judgment were intact [de-identified] : MARIETTA NIETO c/d/i

## 2024-04-24 NOTE — ASSESSMENT
[FreeTextEntry1] : 63F with a PMHx of ESRD, PV, HTN, noncirrhotic portal HTN presenting for wellness check.  #HCM -Pharmacy assisted to enroll patient in [facility] for access to PPI, as per GI recommendation; patient had previously been taking Zantac, discussed r/b/s of PPI and patient amenable. Sent 90d supply. -Advised to f/u with Onc regarding hydroxyurea titration as we are unsure of dose  Case d/w Dr. Gage Patient to f/u in 6 months or PRN

## 2024-04-29 DIAGNOSIS — K31.84 GASTROPARESIS: ICD-10-CM

## 2024-04-29 RX ORDER — HYDROXYUREA 500 MG/1
500 CAPSULE ORAL
Qty: 30 | Refills: 5 | Status: ACTIVE | COMMUNITY
Start: 1900-01-01 | End: 1900-01-01

## 2024-05-01 RX ORDER — CALCIUM ACETATE 667 MG
1 TABLET ORAL
Refills: 0 | DISCHARGE

## 2024-05-01 RX ORDER — FAMOTIDINE 10 MG/ML
1 INJECTION INTRAVENOUS
Refills: 0 | DISCHARGE

## 2024-05-01 RX ORDER — MOMELOTINIB 150 MG/1
1 TABLET ORAL
Refills: 0 | DISCHARGE

## 2024-05-01 RX ORDER — HYDROXYUREA 500 MG/1
1 CAPSULE ORAL
Refills: 0 | DISCHARGE

## 2024-05-01 RX ORDER — CARVEDILOL PHOSPHATE 80 MG/1
1 CAPSULE, EXTENDED RELEASE ORAL
Refills: 0 | DISCHARGE

## 2024-05-01 RX ORDER — URSODIOL 250 MG/1
1 TABLET, FILM COATED ORAL
Refills: 0 | DISCHARGE

## 2024-05-01 RX ORDER — RUXOLITINIB 25 MG/1
1 TABLET ORAL
Refills: 0 | DISCHARGE

## 2024-05-01 RX ORDER — DANAZOL 200 MG/1
1 CAPSULE ORAL
Refills: 0 | DISCHARGE

## 2024-05-02 ENCOUNTER — APPOINTMENT (OUTPATIENT)
Dept: ENDOVASCULAR SURGERY | Facility: CLINIC | Age: 64
End: 2024-05-02
Payer: SELF-PAY

## 2024-05-08 NOTE — ED ADULT NURSE NOTE - NSNEUBEH_NEU_P_CORE
TCM    Patient: Luis Galindo   : 1939 MRN: [id number]    SUBJECTIVE:  Chief Complaint   Patient presents with    Hospital F/U     Hospital FU 2024 to 2024 [facility] Chest pain        A 84 year old female is here for transitional care management.    The recording was initiated after a verbal consent was obtained from the patient to record this visit for documentation in their clinical record.     HISTORY OF PRESENT ILLNESS:    Historian: Self accompanied by friend.     Date of admission: 2024.  Date of discharge: 2024.  Admitted at: [facility] Critical Care Unit.  Discharge diagnosis: NSTEMI (non-ST elevated myocardial infarction).    Ischemic cardiomyopathy: Is on Clopidogrel 75 mg.    H/O non-ST elevation myocardial infarction (NSTEMI): Started on several new medications including Entresto 24-26 mg. Has a follow up appointment with Dr. Parrish, cardiology on 2024.    Essential hypertension, benign: Is on Toprol XL 25 mg, Lasix 20 mg, and Spironolactone 25 mg.    Hypomagnesemia: Due for labs. Her magnesium levels was low seven days ago.    Generalized weakness: She feels it takes effort from standing up from the chair. Her friend reports yesterday that she had a severe headache that lasted a day. Patient did not take her pills today, and her friend reports Luis  is not taking medications consistently. Her friend reports Luis did not shower since being discharged. She did not eat last night. She did not call for evaluation to live in assisted living. Patient does not want to stay permanently in assisted living. They inquired about the difference between a rehabilitation facility, assisted living, and nursing home.     At risk for falls: She had a fall before hospitalization, and developed bruises on the left arm.  She still has a left arm bruise. Report her friend though she was unsteady in hospital, and concerned about it. She is using a walker consistently with benefits. Denies losing balance  while walking. Admits feeling dizzy while standing from sitting position, occasional headaches.     Additional comments  -Complains of yellow colored urine with odor. Denies burning sensation with urination. She does not believe she is drinking enough water.     PAST MEDICAL HISTORY:  Past Medical History:   Diagnosis Date    BREAST CANCER 10/01/2005    left breast T1N0M0 radiation/surgery    Essential hypertension, benign     GERD     Dr Beach; EGD 11/09: Chronic reflux and mild erosive esophagitis    Keratosis, actinic     Myopia with presbyopia 11/7/2013    Pure hypercholesterolemia      MEDICATIONS:  Current Outpatient Medications   Medication Sig    spironolactone (ALDACTONE) 25 MG tablet Take 1 tablet by mouth daily.    sacubitril-valsartan (ENTRESTO) 24-26 MG per tablet Take 1 tablet by mouth every 12 hours.    rosuvastatin (CRESTOR) 40 MG tablet Take 1 tablet by mouth daily.    metoPROLOL succinate (TOPROL-XL) 25 MG 24 hr tablet Take 1 tablet by mouth daily.    empagliflozin (JARDIANCE) 10 MG tablet Take 1 tablet by mouth daily.    clopidogrel (PLAVIX) 75 MG tablet Take 1 tablet by mouth daily.    aspirin (ECOTRIN) 81 MG EC tablet Take 1 tablet by mouth daily.    pantoprazole (PROTONIX) 40 MG tablet Take 1 tablet by mouth daily.    Cholecalciferol (vitamin D3) 125 mcg (5,000 units) capsule Take 1 capsule by mouth daily.    furosemide (LASIX) 20 MG tablet Take 1 tablet by mouth every 72 hours.    galantamine (RAZADYNE) 4 MG tablet Take 1 tablet by mouth in the morning and 1 tablet in the evening. Take with meals.    sertraline (ZOLOFT) 100 MG tablet Take 2 tablets by mouth daily.    MELATONIN PO Take 1 tablet by mouth nightly as needed (sleep).    acetaminophen (Acetaminophen 8 Hour) 650 MG CR tablet Take 1-2 tablets daily. (Patient taking differently: Take 1-2 tablets by mouth daily as needed (headaches or pain).)    Multiple Vitamins-Minerals (MULTI FOR HER 50+) Cap Take 1 capsule by mouth daily.  Indications: Women- brand     No current facility-administered medications for this visit.     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 Negative for lymphovascular invasion. Some peripheral invasion was appreciated. Surgical margins resected were negative. [place] node biopsy was negative    Cardiac catheterization/possible ptca/possible stent  08/31/2018    Dr. Parrish:  Findings: Left Main Coronary artery: Normal; Left Anterior descending artery: Normal; Left Circumflex coronary artery: Normal; Right Coronary artery: Normal ; Left Ventriculogram: Normal.EF 65%. LVEDP 19 mmHg    Dexa bone density axial skeleton  12/11/2009    normal bone mineral density per WHO criteria    Esophagogastroduodenoscopy transoral flex w/bx single or mult  11/3/2009    Dr Beach; Chronic reflux and mild erosive esophagitis    Extraocular muscle surg proc unlisted  01/01/2008    left eye laser surgery    Laparoscopy,tubal cautery  1969    Tubal Ligation    Mastectomy partial  10/10/2005    Left Mastectomy, partial    Remove tonsils/adenoids,<13 y/o  01/01/1950    T & A    Remv 2nd cataract,corn-scler sectn  2008    Left    Total abdom hysterectomy      Total Abd Hyst w BSO bleeding    Xray mammogram diagnostic left  11/04/2009    left  BI RADS Category 2, benign     FAMILY HISTORY:  Family History   Problem Relation Age of Onset    Cancer Mother         breast cancer/breast    Neurological Disorder Mother         dementia    Heart Father         heart attack    Lung Disease Father         COPD    Dementia/Alzheimers  Sister     Patient is unaware of any medical problems Maternal Grandmother     Patient is unaware of any medical problems Maternal Grandfather     Patient is unaware of any medical problems Paternal Grandmother     Patient is unaware of any medical problems Paternal Grandfather     Cancer, Breast Daughter      SOCIAL HISTORY:  Social History     Tobacco Use   Smoking Status Former    Current packs/day: 0.00    Average packs/day: 1.5 packs/day for 40.0 years (60.0 ttl pk-yrs)    Types: Cigarettes    Start date: 1963    Quit date: 2003    Years since quittin.3    Passive exposure: Past   Smokeless Tobacco Never     Social History     Substance and Sexual Activity   Alcohol Use Not Currently       REVIEW OF SYSTEMS:    Constitutional: As Per HPI.  Cardiovascular: As per HPI.  Respiratory: Denies SOB, wheezing, cough.   Abdomen: Denies reflux, nausea, vomiting, diarrhea.  : As per HPI  Musculoskeletal: Denies pain. Using walker.     OBJECTIVE:  Vitals:    24 1028   BP: 122/80   Pulse: 68   Resp: 18   Temp: 96.5 °F (35.8 °C)   TempSrc: Temporal   SpO2: 94%   Weight: 80.6 kg (177 lb 9.3 oz)   Height: 5' 5\"       PHYSICAL EXAM    Constitutional: Alert, in no acute distress and current vital signs reviewed.   HENT:  Atraumatic, external ears normal, no cerumen impaction, tympanic membranes normal, nose normal, oropharynx moist, no pharyngeal exudates.  Respiratory:  No respiratory distress; clear lung sounds upon anterior and posterior auscultation.  Cardiovascular:  Normal rate, normal rhythm, no murmurs, no gallops, no rubs.  GI:  Soft, non-distended, normal bowel sounds, nontender, no hepatosplenomegaly, no mass, no rebound, no guarding.   Musculoskeletal:  No joint swelling, tenderness, no deformities. Back:  Normal alignment.  No pain with cervical, thoracic, lumbar palpation. Ambulating with walker. Observed walking in oliva. Steady gait.   Integument:  No open sores, rash or lesions of concern.  Bruising on arms noted.       DIAGNOSTIC STUDIES:  LAB RESULTS:  Lab Services on 05/07/2024   Component Date Value Ref Range Status    WBC 05/07/2024 6.8  4.2 - 11.0 K/mcL Final    RBC 05/07/2024 4.98  4.00 - 5.20 mil/mcL Final    HGB 05/07/2024 14.0  12.0 - 15.5 g/dL Final    HCT 05/07/2024 41.7  36.0 - 46.5 % Final    MCV 05/07/2024 83.7  78.0 - 100.0 fl Final    MCH 05/07/2024 28.1  26.0 - 34.0 pg Final    MCHC 05/07/2024 33.6  32.0 - 36.5 g/dL Final    RDW-CV 05/07/2024 13.5  11.0 - 15.0 % Final    RDW-SD 05/07/2024 41.0  39.0 - 50.0 fL Final    PLT 05/07/2024 216  140 - 450 K/mcL Final    NRBC 05/07/2024 0  <=0 /100 WBC Final    Neutrophil, Percent 05/07/2024 71  % Final    Lymphocytes, Percent 05/07/2024 21  % Final    Mono, Percent 05/07/2024 6  % Final    Eosinophils, Percent 05/07/2024 2  % Final    Basophils, Percent 05/07/2024 0  % Final    Immature Granulocytes 05/07/2024 0  % Final    Absolute Neutrophils 05/07/2024 4.8  1.8 - 7.7 K/mcL Final    Absolute Lymphocytes 05/07/2024 1.4  1.0 - 4.0 K/mcL Final    Absolute Monocytes 05/07/2024 0.4  0.3 - 0.9 K/mcL Final    Absolute Eosinophils  05/07/2024 0.1  0.0 - 0.5 K/mcL Final    Absolute Basophils 05/07/2024 0.0  0.0 - 0.3 K/mcL Final    Absolute Immature Granulocytes 05/07/2024 0.0  0.0 - 0.2 K/mcL Final   No results displayed because visit has over 200 results.          ASSESSMENT AND PLAN:  This 84 year old female presents with :  1. Ischemic cardiomyopathy    2. H/O non-ST elevation myocardial infarction (NSTEMI)    3. Essential hypertension, benign    4. Hypomagnesemia    5. Generalized weakness    6. At risk for falls        Orders Placed This Encounter    Magnesium    Basic Metabolic Panel    CBC with Automated Differential       PLAN:    Ischemic cardiomyopathy:  Continue current medication.  Ordered Basic Metabolic Panel; Future, CBC with Automated Differential; Future.    H/O non-ST elevation myocardial infarction (NSTEMI):  Hospital discharge  summary reviewed and discussed. Discharge medications reviewed and reconciled.  Reviewed and discussed investigation reports.  Continue current medication.  Continue to follow up with Dr. Parrish.   Ordered Basic Metabolic Panel; Future, CBC with Automated Differential; Future.    Essential hypertension, benign:  Continue current medication.    Hypomagnesemia:  Reviewed and discussed previous reports.  Ordered Magnesium; Future.    Generalized weakness:  Reviewed difference between nursing home with rehabilitation facility vs assisted living, and eligibility criteria.  Explained about home bound care guidelines, eligibility criteria, and process in detail.   Suggested going to an assisted living facility because she is forgetting to take medications, not getting proper nutrition, or bathing regularly. Fall risk present.   Advised to call [facility] in [place] and [place] and express interest in going to the assisted living facility. Recommend touring facilities. Recommend medication management at facility.      At risk for falls:  Continue using a walker.    Follow up in four weeks or sooner if needed.    Refer to orders.  Medical compliance with plan discussed and risks of non-compliance reviewed.  Patient education completed on disease process, etiology & prognosis.  Proper usage and side effects of medications reviewed & discussed.  Patient understands and agrees with the plan.  Return to clinic as clinically indicated as discussed with the patient who verbalized understanding of the plan and is in agreement with the plan.    I, Savana Meade, have created a visit summary document based on the audio recording between Ms. Allyssa Alcazar NP and this patient for the physician to review, edit as needed, and authenticate.    Creation Date: 5/8/2024     I have reviewed and edited the visit summary above and attest that it is accurate.     no

## 2024-05-08 NOTE — PATIENT PROFILE ADULT - FUNCTIONAL ASSESSMENT - DAILY ACTIVITY 6.
[FreeTextEntry1] : 20 m.o. with mild speech delay being seen today for an initial evaluation for staring episodes occurring daily. Mom has concerns for some speech regressions as well. Non focal neuro exam. Will obtain routine eeg and follow up results one week after eeg. Will consider need for inpatient VEEG and MRI at that time.  3 = A little assistance

## 2024-05-09 ENCOUNTER — APPOINTMENT (OUTPATIENT)
Dept: ENDOVASCULAR SURGERY | Facility: CLINIC | Age: 64
End: 2024-05-09
Payer: SELF-PAY

## 2024-05-09 ENCOUNTER — RESULT REVIEW (OUTPATIENT)
Age: 64
End: 2024-05-09

## 2024-05-09 VITALS
BODY MASS INDEX: 20.28 KG/M2 | WEIGHT: 110.23 LBS | HEART RATE: 96 BPM | TEMPERATURE: 98.4 F | SYSTOLIC BLOOD PRESSURE: 119 MMHG | RESPIRATION RATE: 16 BRPM | OXYGEN SATURATION: 100 % | HEIGHT: 62 IN | DIASTOLIC BLOOD PRESSURE: 75 MMHG

## 2024-05-09 PROBLEM — N18.9 CHRONIC KIDNEY DISEASE, UNSPECIFIED: Chronic | Status: ACTIVE | Noted: 2023-11-26

## 2024-05-09 PROCEDURE — 36902Z: CUSTOM

## 2024-05-09 PROCEDURE — 36907Z: CUSTOM | Mod: 59

## 2024-05-09 RX ORDER — FAMOTIDINE 20 MG/1
20 TABLET, FILM COATED ORAL DAILY
Refills: 0 | Status: DISCONTINUED | COMMUNITY
End: 2024-05-09

## 2024-05-09 RX ORDER — PANTOPRAZOLE 40 MG/1
40 TABLET, DELAYED RELEASE ORAL
Qty: 90 | Refills: 1 | Status: DISCONTINUED | COMMUNITY
Start: 2024-04-24 | End: 2024-05-09

## 2024-05-09 RX ORDER — MOMELOTINIB 150 MG/1
150 TABLET ORAL DAILY
Qty: 30 | Refills: 5 | Status: DISCONTINUED | COMMUNITY
Start: 2024-02-06 | End: 2024-05-09

## 2024-05-09 NOTE — ASSESSMENT
[Other: _____] : [unfilled] [FreeTextEntry1] : .ESRD on HD with prolonged bleeding at HD for fistulogram.  COnsent obtained.  ANesthesia plan formulated and discussed with anesthesiologist.  Mod stenoses in central veins and fistula outflow PTA to 10 and 8 m respectively.  Post- images - good result and no complication.  EBL=minimal.  FUll report to follow. .

## 2024-05-09 NOTE — REASON FOR VISIT
[Other ___] : a [unfilled] visit for [Spouse] : spouse [FreeTextEntry2] : central stenosis/3 month fistulogram f/u

## 2024-05-09 NOTE — HISTORY OF PRESENT ILLNESS
[] : left basilic fistula [FreeTextEntry1] : Dr. Dempsey 9/18/17 [FreeTextEntry4] : Yesterday  [FreeTextEntry5] : Yesterday at  104 [FreeTextEntry6] : Dr. Koroma

## 2024-05-13 ENCOUNTER — RESULT REVIEW (OUTPATIENT)
Age: 64
End: 2024-05-13

## 2024-05-13 ENCOUNTER — OUTPATIENT (OUTPATIENT)
Dept: OUTPATIENT SERVICES | Facility: HOSPITAL | Age: 64
LOS: 1 days | End: 2024-05-13
Payer: SELF-PAY

## 2024-05-13 ENCOUNTER — APPOINTMENT (OUTPATIENT)
Dept: HEMATOLOGY ONCOLOGY | Facility: CLINIC | Age: 64
End: 2024-05-13
Payer: COMMERCIAL

## 2024-05-13 VITALS
BODY MASS INDEX: 21.18 KG/M2 | DIASTOLIC BLOOD PRESSURE: 81 MMHG | WEIGHT: 115.08 LBS | HEART RATE: 83 BPM | OXYGEN SATURATION: 99 % | TEMPERATURE: 98.3 F | HEIGHT: 62 IN | SYSTOLIC BLOOD PRESSURE: 137 MMHG | RESPIRATION RATE: 16 BRPM

## 2024-05-13 DIAGNOSIS — D64.9 ANEMIA, UNSPECIFIED: ICD-10-CM

## 2024-05-13 DIAGNOSIS — Z99.2 DEPENDENCE ON RENAL DIALYSIS: Chronic | ICD-10-CM

## 2024-05-13 DIAGNOSIS — T85.898A OTHER SPECIFIED COMPLICATION OF OTHER INTERNAL PROSTHETIC DEVICES, IMPLANTS AND GRAFTS, INITIAL ENCOUNTER: Chronic | ICD-10-CM

## 2024-05-13 DIAGNOSIS — I77.0 ARTERIOVENOUS FISTULA, ACQUIRED: Chronic | ICD-10-CM

## 2024-05-13 PROBLEM — D75.1 SECONDARY POLYCYTHEMIA: Chronic | Status: ACTIVE | Noted: 2024-03-18

## 2024-05-13 PROBLEM — N18.6 END STAGE RENAL DISEASE: Chronic | Status: ACTIVE | Noted: 2023-11-26

## 2024-05-13 LAB
ANISOCYTOSIS BLD QL: SLIGHT — SIGNIFICANT CHANGE UP
BASOPHILS # BLD AUTO: 0 K/UL — SIGNIFICANT CHANGE UP (ref 0–0.2)
BASOPHILS NFR BLD AUTO: 0 % — SIGNIFICANT CHANGE UP (ref 0–2)
DACRYOCYTES BLD QL SMEAR: SLIGHT — SIGNIFICANT CHANGE UP
EOSINOPHIL # BLD AUTO: 0.06 K/UL — SIGNIFICANT CHANGE UP (ref 0–0.5)
EOSINOPHIL NFR BLD AUTO: 1 % — SIGNIFICANT CHANGE UP (ref 0–6)
HAPTOGLOB SERPL-MCNC: <20 MG/DL
HCT VFR BLD CALC: 28.5 % — LOW (ref 34.5–45)
HGB BLD-MCNC: 8.9 G/DL — LOW (ref 11.5–15.5)
LYMPHOCYTES # BLD AUTO: 1.01 K/UL — SIGNIFICANT CHANGE UP (ref 1–3.3)
LYMPHOCYTES # BLD AUTO: 16 % — SIGNIFICANT CHANGE UP (ref 13–44)
MACROCYTES BLD QL: SLIGHT — SIGNIFICANT CHANGE UP
MCHC RBC-ENTMCNC: 31.2 G/DL — LOW (ref 32–36)
MCHC RBC-ENTMCNC: 33.8 PG — SIGNIFICANT CHANGE UP (ref 27–34)
MCV RBC AUTO: 108.3 FL — HIGH (ref 80–100)
MONOCYTES # BLD AUTO: 0.13 K/UL — SIGNIFICANT CHANGE UP (ref 0–0.9)
MONOCYTES NFR BLD AUTO: 2 % — SIGNIFICANT CHANGE UP (ref 2–14)
NEUTROPHILS # BLD AUTO: 5.13 K/UL — SIGNIFICANT CHANGE UP (ref 1.8–7.4)
NEUTROPHILS NFR BLD AUTO: 81 % — HIGH (ref 43–77)
NRBC # BLD: 0 /100 WBCS — SIGNIFICANT CHANGE UP (ref 0–0)
NRBC # BLD: SIGNIFICANT CHANGE UP /100 WBCS (ref 0–0)
PLAT MORPH BLD: NORMAL — SIGNIFICANT CHANGE UP
PLATELET # BLD AUTO: 93 K/UL — LOW (ref 150–400)
POIKILOCYTOSIS BLD QL AUTO: SLIGHT — SIGNIFICANT CHANGE UP
POLYCHROMASIA BLD QL SMEAR: SLIGHT — SIGNIFICANT CHANGE UP
RBC # BLD: 2.63 M/UL — LOW (ref 3.8–5.2)
RBC # FLD: 21.2 % — HIGH (ref 10.3–14.5)
RBC BLD AUTO: ABNORMAL
RETICS #: 113.6 K/UL — SIGNIFICANT CHANGE UP (ref 25–125)
RETICS/RBC NFR: 4.3 % — HIGH (ref 0.5–2.5)
WBC # BLD: 6.33 K/UL — SIGNIFICANT CHANGE UP (ref 3.8–10.5)
WBC # FLD AUTO: 6.33 K/UL — SIGNIFICANT CHANGE UP (ref 3.8–10.5)

## 2024-05-13 PROCEDURE — 86900 BLOOD TYPING SEROLOGIC ABO: CPT

## 2024-05-13 PROCEDURE — 86880 COOMBS TEST DIRECT: CPT

## 2024-05-13 PROCEDURE — 99204 OFFICE O/P NEW MOD 45 MIN: CPT

## 2024-05-13 PROCEDURE — 86850 RBC ANTIBODY SCREEN: CPT

## 2024-05-13 PROCEDURE — 86901 BLOOD TYPING SEROLOGIC RH(D): CPT

## 2024-05-13 PROCEDURE — 86922 COMPATIBILITY TEST ANTIGLOB: CPT

## 2024-05-13 RX ORDER — MOMELOTINIB 100 MG/1
100 TABLET ORAL DAILY
Qty: 30 | Refills: 3 | Status: ACTIVE | COMMUNITY
Start: 2024-05-13 | End: 1900-01-01

## 2024-05-13 RX ORDER — URSODIOL 500 MG/1
500 TABLET ORAL TWICE DAILY
Qty: 60 | Refills: 3 | Status: ACTIVE | COMMUNITY
Start: 1900-01-01 | End: 1900-01-01

## 2024-05-14 NOTE — ASSESSMENT
[FreeTextEntry1] : This is a 63 woman with a history of Polycythemia Vera, MAK 2 + since 2012. 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 She is now under my super vision for the P. Vera.  Jakafi was initially started at 15mg TIW. Dose was decreased to 10mg TIW due to anemia though that was likely form the lack of danazol. Since then dose was adjusted back to 15mg then to 20mg TIW.  Patient was taken off Jakafi with intent to switch to momelotinib (Ojjaara) 200mg daily in setting of dialysis.  This dose was later decreased to 150mg due to IG complaints, diarrhea.  Patient still experienced these symptoms to a lesser extent despite the dose reduction.  Patient eventually stopped the Ojjarra and is just on the hydroxyurea right now 500mg daily for the abdominal pain and hepatosplenomegaly.  The hepatosplenomegaly is doing fine right now, and patient is pain free, however patient  becoming increasingly more anemic and thrombocytopenic.  Counts better back when she was on the Ojjaarra.  Recommended patient retry the Ojjarra 100mg daily instead of the 150mg dose.  Follow up in 2 months.

## 2024-05-14 NOTE — HISTORY OF PRESENT ILLNESS
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  Paitent remains on the Patient off of hte ojarra due to GI toxicity, stomach aches and diarrhea.  Patient off Ojjaara now, remains on the hydroxyurea.  Patient noticed that the GI toxicity went away, but wiith the hydrea only, patient expericing mild allopecia.      Either way without the Jakafi on board the h g remains msotly stable, though down trending off of hte Ojaara.     Platelets were 145 in [facility], but only 75k/ul at the lab today on hydrea alone. Patient hjad ue2phlr platelet counts with the Ojaara on board than off.    _____  Patient on Ojarra every day prior to the hospital stay, but was off the Ojjaara for 3 weeks despite the last set of instructions to restart it.  Hg ended up being ok at 9.9g/dl so we can  stay off of it in this case, the Ojjaara was intended for the anemia Hydroxyurea was only being taken every other day after dialysis.    Overall given the current circumstance recommend patient stay off the ojjaara given Hg stable 9.9, but increase the hydroxyurea to 500mg daily not 3 times a week.   Follow up in 1 month we can restart Ojjaara 150mg for the  anemia, or if splenomegaly remains the larger concern, restart the federation.    Paient having some dizziness, when she gets up and when she moves around.   Does not syncopate or have presyncope, has spinning vertigo.

## 2024-05-14 NOTE — PHYSICAL EXAM
[Normal] : normal appearance, no rash, nodules, vesicles, ulcers, erythema [de-identified] : mild jaundice

## 2024-05-17 LAB
FERRITIN SERPL-MCNC: 1034 NG/ML
FOLATE SERPL-MCNC: >20 NG/ML
IRON SATN MFR SERPL: 61 %
IRON SERPL-MCNC: 90 UG/DL
LDH SERPL-CCNC: 202 U/L
TIBC SERPL-MCNC: 148 UG/DL
UIBC SERPL-MCNC: 58 UG/DL
VIT B12 SERPL-MCNC: 1283 PG/ML

## 2024-05-22 DIAGNOSIS — R59.0 LOCALIZED ENLARGED LYMPH NODES: ICD-10-CM

## 2024-05-27 ENCOUNTER — NON-APPOINTMENT (OUTPATIENT)
Age: 64
End: 2024-05-27

## 2024-06-06 NOTE — PROGRESS NOTE ADULT - ATTENDING COMMENTS
Patient Education        Well Visit, Ages 18 to 65: Care Instructions  Well visits can help you stay healthy. Your doctor has checked your overall health and may have suggested ways to take good care of yourself. Your doctor also may have recommended tests. You can help prevent illness with healthy eating, good sleep, vaccinations, regular exercise, and other steps.    Get the tests that you and your doctor decide on. Depending on your age and risks, examples might include screening for diabetes; hepatitis C; HIV; and cervical, breast, lung, and colon cancer. Screening helps find diseases before any symptoms appear.   Eat healthy foods. Choose fruits, vegetables, whole grains, lean protein, and low-fat dairy foods. Limit saturated fat and reduce salt.     Limit alcohol. Men should have no more than 2 drinks a day. Women should have no more than 1. For some people, no alcohol is the best choice.   Exercise. Get at least 30 minutes of exercise on most days of the week. Walking can be a good choice.     Reach and stay at your healthy weight. This will lower your risk for many health problems.   Take care of your mental health. Try to stay connected with friends, family, and community, and find ways to manage stress.     If you're feeling depressed or hopeless, talk to someone. A counselor can help. If you don't have a counselor, talk to your doctor.   Talk to your doctor if you think you may have a problem with alcohol or drug use. This includes prescription medicines, marijuana, and other drugs.     Avoid tobacco and nicotine: Don't smoke, vape, or chew. If you need help quitting, talk to your doctor.   Practice safer sex. Getting tested, using condoms or dental dams, and limiting sex partners can help prevent STIs.     Use birth control if it's important to you to prevent pregnancy. Talk with your doctor about your choices and what might be best for you.   Prevent problems where you can. Protect your skin from too  Hemoperitoneum,ruptured vessel.  PD-->HD  1.  ESRD--agree with change modality  2.  Anemia--blood loss+1.  INDER  3. Htn--continue med, optimize volume  4.  Renal osteodystrophy--binders

## 2024-06-17 NOTE — PATIENT PROFILE ADULT - PATIENT/CAREGIVER ACCEPTED INTERPRETER SERVICES
Session ID: [id number]  Language: ASL   ID: #[id number]   Name: Maik Follow up with your primary care doctor in 48 hours for re-evaluation and further treatment.  Follow up with a pulmonologist within 1 week for re-evaluation and further treatment.    Shortness of breath    Shortness of breath means you have trouble breathing and could indicate a medical problem. Causes include lung diseases, heart disease, low amount of red blood cells (anemia), poor physical fitness, being overweight, smoking, etc. Your health care provider may not be able to find a cause for your shortness of breath after your exam. In this case, it is important to have a follow-up exam with your primary care physician as instructed. If medicines were prescribed, take them as directed for the full length of time directed. Refrain from tobacco products.    SEEK IMMEDIATE MEDICAL CARE IF YOU HAVE THE FOLLOWING SYMPTOMS: worsening shortness of breath, chest pain, back pain, abdominal pain, fever, coughing up blood, lightheadedness/dizziness. yes

## 2024-07-07 NOTE — ED PROVIDER NOTE - CARDIOVASCULAR NEGATIVE STATEMENT, MLM
7 AM   Assumed care, pending labwork and cxr.  EKG appears to be potentially atrial flutter with bifascicular block.  Patient denies any cardiac history.  Currently denies any symptoms.    8:00 AM  EKG interpretation: Atrial flutter with 41 AV conduction, right bundle branch block, left anterior fascicular block, left axis deviation, no STEMI.    8:15 AM   After Cardizem 20 mg IV, patient's heart rate down to 80 bpm.  Repeat EKG does show atrial flutter with 4-1 AV conduction.  Patient still asymptomatic.  I will order the patient Eliquis and Cardizem to his pharmacy.  I counseled him to cut down on his daily aspirin from full dose to a baby aspirin.  I did ask that he call his PCP tomorrow morning for an appointment.  Additionally, I did send social work request for cardiac referral.  Strict return precautions were given to the patient.  [id number] is 4.  Stable for discharge.     Renny Real DO  07/07/24 0817       Renny Rela DO  07/07/24 0826     no chest pain and no edema.

## 2024-07-16 NOTE — PRE PROCEDURE NOTE - PROCEDURE SERVICE
-- DO NOT REPLY / DO NOT REPLY ALL --  -- Message is from [facility] [employer] ([facility]) --    Offered Waitlist if Available for the Visit Type? Yes    Caller is requesting an appointment - at a sooner time than what was available.      PCP unavailable for post-hospital follow up    Reason for Visit: Discharged 11/17, nerve pain down right leg, [facility], patient needs to be seen within a week days. Dr. Newell's next avaiable appt is not until 12/10 and patient needs to be seen sooner    Is the patient currently scheduled? No    Preferred time to be seen: anytime    Caller Information       Type Contact Phone/Fax    11/17/2023 09:12 AM CST Phone (Incoming) Clifford Nolan (Self) [phone] (M)          Alternative phone number: no    Can a detailed message be left? Yes    Message Turnaround: [facility]ISOU[facility]:    Refer to site's KB page for routing instructions    Please give this turnaround time to the caller:   \"You can expect to receive a response 1-3 business days after your provider's clinical team reviews the message\"  
Endoscopy
Vascular and Interventional Radiology
Arlene Lombardy

## 2024-07-18 ENCOUNTER — NON-APPOINTMENT (OUTPATIENT)
Age: 64
End: 2024-07-18

## 2024-07-19 ENCOUNTER — RESULT REVIEW (OUTPATIENT)
Age: 64
End: 2024-07-19

## 2024-07-19 ENCOUNTER — APPOINTMENT (OUTPATIENT)
Dept: HEMATOLOGY ONCOLOGY | Facility: CLINIC | Age: 64
End: 2024-07-19
Payer: COMMERCIAL

## 2024-07-19 ENCOUNTER — APPOINTMENT (OUTPATIENT)
Dept: INFUSION THERAPY | Facility: HOSPITAL | Age: 64
End: 2024-07-19

## 2024-07-19 ENCOUNTER — OUTPATIENT (OUTPATIENT)
Dept: OUTPATIENT SERVICES | Facility: HOSPITAL | Age: 64
LOS: 1 days | End: 2024-07-19
Payer: MEDICAID

## 2024-07-19 VITALS
DIASTOLIC BLOOD PRESSURE: 79 MMHG | SYSTOLIC BLOOD PRESSURE: 134 MMHG | RESPIRATION RATE: 16 BRPM | BODY MASS INDEX: 20.16 KG/M2 | HEART RATE: 84 BPM | OXYGEN SATURATION: 98 % | WEIGHT: 110.23 LBS | TEMPERATURE: 97.9 F

## 2024-07-19 DIAGNOSIS — Z99.2 DEPENDENCE ON RENAL DIALYSIS: Chronic | ICD-10-CM

## 2024-07-19 DIAGNOSIS — I77.0 ARTERIOVENOUS FISTULA, ACQUIRED: Chronic | ICD-10-CM

## 2024-07-19 DIAGNOSIS — D75.81 MYELOFIBROSIS: ICD-10-CM

## 2024-07-19 DIAGNOSIS — D45 POLYCYTHEMIA VERA: ICD-10-CM

## 2024-07-19 DIAGNOSIS — T85.898A OTHER SPECIFIED COMPLICATION OF OTHER INTERNAL PROSTHETIC DEVICES, IMPLANTS AND GRAFTS, INITIAL ENCOUNTER: Chronic | ICD-10-CM

## 2024-07-19 DIAGNOSIS — D64.9 ANEMIA, UNSPECIFIED: ICD-10-CM

## 2024-07-19 PROCEDURE — 99213 OFFICE O/P EST LOW 20 MIN: CPT

## 2024-07-19 PROCEDURE — 86922 COMPATIBILITY TEST ANTIGLOB: CPT

## 2024-07-19 PROCEDURE — 86901 BLOOD TYPING SEROLOGIC RH(D): CPT

## 2024-07-19 PROCEDURE — 86850 RBC ANTIBODY SCREEN: CPT

## 2024-07-19 PROCEDURE — 86900 BLOOD TYPING SEROLOGIC ABO: CPT

## 2024-07-22 LAB
FERRITIN SERPL-MCNC: 671 NG/ML
FOLATE SERPL-MCNC: 13.8 NG/ML
IRON SERPL-MCNC: 66 UG/DL
TIBC SERPL-MCNC: 166 UG/DL
VIT B12 SERPL-MCNC: 782 PG/ML

## 2024-07-31 NOTE — ED CLERICAL - BED REQUESTED
[facility] Hospital[facility] History and Physical      PCP: Zaire Fishman MD  Notified via Epic/PS  ?    Chief Complaint:     Chief Complaint   Patient presents with    Altered Mental Status       History Of Present Illness:  Patient is a 57-year-old man with past medical history significant for coronary artery disease, DM2, hypertension, dyslipidemia who was admitted to [facility] on 7/23/2024 with aphasia and was noted to have acute CVA with infarcts involving the left hemisphere patient also noted to have severe cervical left ICA stenosis.  He was started on dual antiplatelets and underwent left CEA on 7/26Postprocedure patient was noted to have worsening aphasia and also noted to have right-sided weakness.  A stat MRI was done which showed new small acute infarct in the left precentral gyrus and frontal lobe with concern for occlusion of left ICA extracranially with residual flow through the distal intracranial ICA.  Patient has been in neuro ICU.  Our service has been consulted to assume medical care.  Upon my evaluation patient appears comfortable.  He demonstrated significant expressive aphasia and repeats the word yes only.  He also has right-sided weakness.      14 point Review of Systems is negative except for as noted above.      Past Medical History  Past Medical History:   Diagnosis Date    Coronary artery disease     Diabetes mellitus  (CMD)     Essential (primary) hypertension     High cholesterol        Surgical History  Past Surgical History:   Procedure Laterality Date    Coronary artery bypass graft          Social History  Social History     Tobacco Use    Smoking status: Never    Smokeless tobacco: Never   Vaping Use    Vaping status: never used   Substance Use Topics    Alcohol use: Yes     Alcohol/week: 1.0 standard drink of alcohol     Types: 1 Cans of beer per week     Comment: occasional    Drug use: Never     Patient denies other alcohol, tobacco, or illicit drug use except for as noted  10-Apr-2021 16:55 above.    Family History    Family History   Problem Relation Age of Onset    Systemic Lupus Erythematosus Mother     Diabetes Father     Heart disease Father      Patient denies/does not have knowledge / is not aware / of any other chronic conditions in mother father or direct siblings.    Allergies  ALLERGIES:  Patient has no known allergies.  Patient denies/does not have knowledge of any other allergies    Home Medications  Medications Prior to Admission   Medication Sig Dispense Refill    metFORMIN (GLUCOPHAGE) 500 MG tablet Take 1 tablet by mouth daily (with breakfast). 90 tablet 0    atorvastatin (LIPITOR) 80 MG tablet Take 1 tablet by mouth daily. 90 tablet 0    amLODIPine (NORVASC) 10 MG tablet Take 1 tablet by mouth daily. 90 tablet 0    Aspirin Low Dose 81 MG EC tablet Take 1 tablet by mouth daily. 90 tablet 3    metoPROLOL succinate (TOPROL-XL) 25 MG 24 hr tablet Take 1 tablet by mouth daily. 90 tablet 3     Reviewed with patient.     Inpatient Medications  Current Facility-Administered Medications   Medication Dose Route Frequency Provider Last Rate Last Admin    hydrALAZINE (APRESOLINE) injection 5 mg  5 mg Intravenous Q12H PRN Anamika Simms CNP        [Held by provider] labetalol (NORMODYNE) injection 10 mg  10 mg Intravenous Q4H PRN Sweis Vijaya T, DO        ondansetron (ZOFRAN) injection 4 mg  4 mg Intravenous Q6H PRN Sweis Vijaya T, DO   4 mg at 07/28/24 1223    [Held by provider] amLODIPine (NORVASC) tablet 10 mg  10 mg Oral Daily Sweandi Vijaya T, DO   10 mg at 07/28/24 0834    polyethylene glycol (MIRALAX) packet 17 g  17 g Oral Daily Maira T Stack CNP   17 g at 07/30/24 0848    docusate sodium-sennosides (SENOKOT S) 50-8.6 MG 1 tablet  1 tablet Oral BID Maria T Stack CNP   1 tablet at 07/30/24 0849    HYDROcodone-acetaminophen (NORCO) 5-325 MG per tablet 1 tablet  1 tablet Oral Q4H PRN Eitan Harman MD   1 tablet at 07/27/24 1645    [Held by provider] metoPROLOL tartrate  (LOPRESSOR) tablet 25 mg  25 mg Oral 2 times per day Vijaya Lane, DO   25 mg at 07/28/24 0834    sodium chloride 0.9% infusion   Intravenous Continuous PRN Pekarek, Zeynep A, DO        sodium chloride 0.9% infusion   Intravenous Continuous PRN Pekarek, Zeynep A, DO        acetaminophen (TYLENOL) tablet 650 mg  650 mg Oral Q4H PRN Sherry Bryant, CNP   650 mg at 07/29/24 0815    sodium chloride 0.9 % flush bag 25 mL  25 mL Intravenous PRN Rahim, Krishna, DO        sodium chloride 0.9 % injection 2 mL  2 mL Intracatheter 2 times per day Rahim, Krishna, DO   2 mL at 07/30/24 0849    heparin (porcine) injection 5,000 Units  5,000 Units Subcutaneous 3 times per day Maria T Stack, CNP   5,000 Units at 07/30/24 1325    atorvastatin (LIPITOR) tablet 80 mg  80 mg Oral Daily Rahim, Krishna, DO   80 mg at 07/30/24 0848    aspirin (ECOTRIN) enteric coated tablet 81 mg  81 mg Oral Daily Rahim, Krishna, DO   81 mg at 07/30/24 0848    dextrose 50 % injection 25 g  25 g Intravenous PRN Rahim, Krishna, DO        dextrose 50 % injection 12.5 g  12.5 g Intravenous PRN Rahim, Krishna, DO        glucagon (GLUCAGEN) injection 1 mg  1 mg Intramuscular PRN Rahim, Krishna, DO        dextrose (GLUTOSE) 40 % gel 15 g  15 g Oral PRN Rahim, Krishna, DO        dextrose (GLUTOSE) 40 % gel 30 g  30 g Oral PRN Rahim, Krishna, DO        insulin lispro (ADMELOG,HumaLOG) - Correction Dose   Subcutaneous TID WC Rahim, Krishna, DO   1 Units at 07/30/24 0848    insulin lispro (ADMELOG,HumaLOG) - Correction Dose   Subcutaneous Nightly Rahim, Krishna, DO        clopidogrel (PLAVIX) tablet 75 mg  75 mg Oral Daily Rahim, Krishna, DO   75 mg at 07/30/24 0848     All inpatient medications, side effects and potential interactions discussed.    In/Out    Intake/Output Summary (Last 24 hours) at 7/30/2024 2005  Last data filed at 7/30/2024 1359  Gross per 24 hour   Intake --   Output 1000 ml   Net -1000 ml        Physical Exam  Visit Vitals  BP (!) 160/109    Pulse 82   Temp 99.1 °F (37.3 °C) (Oral)   Resp 18   Ht 5' 9\" (1.753 m)   Wt 122 kg (268 lb 15.4 oz)   SpO2 96%   BMI 39.72 kg/m²       Physical Exam:  General: Alert and oriented, no acute distress  Eyes: no scleral icterus, no conjunctival erythema   Cardio: S1, S2, RRR, no murmur, rub, gallop or thrills noted.   Pulm: Lungs clear to auscultation bilaterally, no wheeze or rhonchi noted. No chest wall tenderness  GI: Soft, non-tender, nondistended. Normal bowel sounds auscultated x4 quadrants  : No suprapubic Tenderness, no CVA tenderness bilaterally  Ext: No upper or lower extremity edema noted. No cords palpated.   Musculoskeletal: 5/5 strength both upper and lower extremities. No joint tenderness or erythema.  Skin: No abnormal bruising or discoloration noted. No jaundice.   Psych: Appropriate mood and affect. Good Insight and Judgment  Neuro: Marked expressive aphasia answers yes to most questions   Right-sided weakness   Labs     Recent Labs   Lab 07/30/24  0239 07/29/24  0232 07/28/24  0521 07/25/24  0531 07/24/24  0412   SODIUM 139 140 137   < > 136   POTASSIUM 3.6 3.7 3.8   < > 4.0   CHLORIDE 107 108 108   < > 107   CO2 27 27 24   < > 23   BUN 16 17 12   < > 11   CREATININE 1.11 1.23* 1.14   < > 1.15   GLUCOSE 126* 120* 132*   < > 118*   ALBUMIN  --  2.9*  --   --  3.3*   PHOS 2.6 2.8 2.9   < >  --    AST  --  29  --   --  41*   BILIRUBIN  --  0.7  --   --  0.9    < > = values in this interval not displayed.       Imaging    MRA NECK WO CONTRAST   Final Result   Addendum (preliminary) 1 of 1   Addendum:      A small T1 hyperintense lesion immediately superior to the left carotid   bifurcation measuring 1.4 x 1.0 x 1.9 cm may represent a small    postsurgical   hematoma following left carotid endarterectomy. This may place mass effect   on the left cervical internal carotid artery contributing to increased   stenosis/occlusion.      Electronically Signed by: COOPER MATTA MD    Signed on: 7/28/2024  12:34 PM    Workstation ID: [id number]      Final      1.   New small to moderate acute infarct in the left precentral gyrus with   additional smaller acute infarcts in the anterior left frontal lobe white   matter and left basal ganglia. No acute intracranial hemorrhage.   2.   Redemonstrated evolving acute to subacute infarcts in the left frontal   lobe.   3.   Complete occlusion of the left internal carotid artery at its origin,   likely increased since 7/23/2024 when there was 99% stenosis.   4.   Decreased flow throughout the left middle cerebral artery without   definite focal occlusion.   5.   New complete occlusion of the left branch of the trifurcated anterior   cerebral arteries at its origin.   6.   Chronic occlusion of the left posterior cerebral artery.      Electronically Signed by: COPOER MATTA MD    Signed on: 7/28/2024 12:11 PM    Workstation ID: [id number]      MRA HEAD WO CONTRAST   Final Result   Addendum (preliminary) 1 of 1   Addendum:      A small T1 hyperintense lesion immediately superior to the left carotid   bifurcation measuring 1.4 x 1.0 x 1.9 cm may represent a small    postsurgical   hematoma following left carotid endarterectomy. This may place mass effect   on the left cervical internal carotid artery contributing to increased   stenosis/occlusion.      Electronically Signed by: COOPER MATTA MD    Signed on: 7/28/2024 12:34 PM    Workstation ID: [id number]      Final      1.   New small to moderate acute infarct in the left precentral gyrus with   additional smaller acute infarcts in the anterior left frontal lobe white   matter and left basal ganglia. No acute intracranial hemorrhage.   2.   Redemonstrated evolving acute to subacute infarcts in the left frontal   lobe.   3.   Complete occlusion of the left internal carotid artery at its origin,   likely increased since 7/23/2024 when there was 99% stenosis.   4.   Decreased flow throughout the left middle cerebral  artery without   definite focal occlusion.   5.   New complete occlusion of the left branch of the trifurcated anterior   cerebral arteries at its origin.   6.   Chronic occlusion of the left posterior cerebral artery.      Electronically Signed by: COOPER MATTA MD    Signed on: 7/28/2024 12:11 PM    Workstation ID: [id number]      MRI BRAIN WO CONTRAST   Final Result   Addendum (preliminary) 1 of 1   Addendum:      A small T1 hyperintense lesion immediately superior to the left carotid   bifurcation measuring 1.4 x 1.0 x 1.9 cm may represent a small    postsurgical   hematoma following left carotid endarterectomy. This may place mass effect   on the left cervical internal carotid artery contributing to increased   stenosis/occlusion.      Electronically Signed by: COOPER MATTA MD    Signed on: 7/28/2024 12:34 PM    Workstation ID: [id number]      Final      1.   New small to moderate acute infarct in the left precentral gyrus with   additional smaller acute infarcts in the anterior left frontal lobe white   matter and left basal ganglia. No acute intracranial hemorrhage.   2.   Redemonstrated evolving acute to subacute infarcts in the left frontal   lobe.   3.   Complete occlusion of the left internal carotid artery at its origin,   likely increased since 7/23/2024 when there was 99% stenosis.   4.   Decreased flow throughout the left middle cerebral artery without   definite focal occlusion.   5.   New complete occlusion of the left branch of the trifurcated anterior   cerebral arteries at its origin.   6.   Chronic occlusion of the left posterior cerebral artery.      Electronically Signed by: COOPER MATTA MD    Signed on: 7/28/2024 12:11 PM    Workstation ID: [id number]      CT HEAD WO CONTRAST   Final Result   1.  No new intracranial finding.   2.  No intracranial mass-effect or hemorrhage.   3.  Evolving infarct.            Electronically signed by Boogie Moser MD on 07 28 24 at 05:28       CT HEAD WO CONTRAST   Final Result      Multiple redemonstrated acute/early subacute and chronic infarcts in the   left cerebral hemisphere, unchanged in extent. No acute intracranial   hemorrhage or internal brain herniation.      Electronically Signed by: COOPER MATTA MD    Signed on: 7/27/2024 8:11 AM    Workstation ID: [id number]      CT HEAD WO CONTRAST   Final Result      1. Unchanged acute infarctions in the left frontal and parietal lobes   involving the left anterior and middle cerebral artery territories as seen   on recent MRI.      2. Unchanged chronic infarctions in the left frontal, parietal, and   occipital lobes involving the left middle and posterior cerebral artery   territories.      3. No acute intracranial hemorrhage. No mass effect or shift.         Electronically Signed by: ELIZABETH BERGMAN MD    Signed on: 7/25/2024 2:05 PM    Workstation ID: [id number]      MRI BRAIN WO CONTRAST   Final Result      1.   Small acute infarcts in the left frontal and parietal lobes and   posterior left insula in the left anterior and middle cerebral artery   territories. No acute intracranial hemorrhage.   2.   Chronic infarcts in the left frontal, left parietal and occipital   lobes.   3.   Chronic microhemorrhage in the right cerebellar hemisphere,   nonspecific.      Electronically Signed by: COOPER MATTA MD    Signed on: 7/23/2024 4:56 PM    Workstation ID: [id number]      CTA HEAD AND NECK   Final Result      1.   Near total occlusion (99%) of the left internal carotid artery   extending from the carotid bifurcation to the paraclinoid internal carotid   artery segment with distal opacification of the carotid terminus likely   secondary to collateral flow from the anterior communicating artery.   Minimal opacification present within the left internal carotid artery   (string sign).      2.   Trifurcation of the anterior cerebral arteries with moderate/severe   stenosis of a left A2/A3  anterior cerebral artery branch and suspected   occlusion of a pericallosal branch.      3.   Chronic appearing occlusion of the left posterior cerebral artery   correlates to area of chronic ischemia.      Findings were communicated by telephone to Dr. Mae Vaca by Dr. Luong (7/23/2024 3:47 PM).         Electronically Signed by: BONNIE LUONG MD    Signed on: 7/23/2024 3:51 PM    Workstation ID: [id number]      CT HEAD WO CONTRAST   Final Result      Suspected multiple subacute infarctions in the left cerebral hemisphere   involving portions of the left anterior, middle, and posterior cerebral   artery territories. Recommend MRI brain for confirmation.      No acute intracranial hemorrhage. No midline shift.         Electronically Signed by: ELIZABETH BERGMAN MD    Signed on: 7/23/2024 9:31 AM    Workstation ID: [id number]          Microbiology Results       None            I personally reviewed the patient's imaging, radiology and report(s).     LAST ECHO/ECHO STRESS:  No valid procedures specified.    Echocardiogram Results Personally reviewed by me.     Assessment/Plan:  All the following conditions PRESENT ON ADMISSION.     Patient is a 57-year-old man with past medical history significant for coronary artery disease, DM2, hypertension, dyslipidemia who was admitted to [facility] on 7/23/2024 with aphasia and was noted to have acute CVA with infarcts involving the left hemisphere patient also noted to have severe cervical left ICA stenosis.  He was started on dual antiplatelets and underwent left CEA on 7/26Postprocedure patient was noted to have worsening aphasia and also noted to have right-sided weakness.  A stat MRI was done which showed new small acute infarct in the left precentral gyrus and frontal lobe with concern for occlusion of left ICA extracranially with residual flow through the distal intracranial ICA.  Patient has been in neuro ICU.  Our service has been consulted to  assume medical care.  Upon my evaluation patient appears comfortable.  He demonstrated significant expressive aphasia and repeats the word yes only.  He also has right-sided weakness.    *Acute CVA  Left ICA stenosis  Status post left CEA 7/26  Patient noted to have worsening aphasia and more prominent right-sided weakness postprocedure  Imaging  Brain MRI (7/28/24):  small to moderate acute infarct in the left precentral gyrus with additional smaller acute infarcts in the anterior left frontal lobe white matter and left basal ganglia  -MRA/ neack and  head (7/28/24): complete occlusion of the left internal carotid artery at its origin, likely increased since 7/23/2024 when there was 99% stenosis + new complete occlusion of the left branch of the trifurcated anterior cerebral arteries at its origin  Per neurology  Continue aspirin 81 and Plavix 75 mg daily  Continue statin  Patient to be transferred out of the ICU  Cleared to start heparin subcutaneous  PT OT eval and PM&R consult    *Hypertension  Patient is on Norvasc and metoprolol  *Dyslipidemia continue statin  *DM2 on metformin currently continue SSI  CAD native artery  Debility residual aphasia and right-sided weakness*      DVT Prophylaxis:   Current Active Medications for DVT Prophylaxis (From admission, onward)           Stop     heparin (porcine) injection 5,000 Units  5,000 Units,   Subcutaneous,   3 times per day         --                   Diet: Consistent Carb Moderate (45-75 Gm/Meal) Diet  Baseline Activity: Ambulates Independently    CODE STATUS:   Code Status: Full Resuscitation    Physician Notification:  Consultants notified of patient via Perfect Serve.  Communication: with patient, nurse, ER physician / Resident    MORE than 75 MINS WERE SPENT ON THIS PATIENTS CARE TODAY. This includes the following: Reviewed all vitals, medications, new orders, I/O, labs, micro, radiology, nurses notes, pertinent consultant notes which are reflected in  assessment and plan.This does not include time spent on other items of care such as smoking cessation counseling, prolonged care time, and or advanced care planning if applicable.   (Level 1 HP: 40 min, Level 2 HP: 55 min, Level 3 HP: 75 min)           [facility] Hospitalist  7/30/2024 8:05 PM

## 2024-08-06 ENCOUNTER — RESULT REVIEW (OUTPATIENT)
Age: 64
End: 2024-08-06

## 2024-08-06 ENCOUNTER — APPOINTMENT (OUTPATIENT)
Dept: ENDOVASCULAR SURGERY | Facility: CLINIC | Age: 64
End: 2024-08-06

## 2024-08-06 PROCEDURE — 99213 OFFICE O/P EST LOW 20 MIN: CPT | Mod: 25

## 2024-08-06 PROCEDURE — 36907Z: CUSTOM | Mod: 59

## 2024-08-06 PROCEDURE — 36903Z: CUSTOM

## 2024-08-06 NOTE — HISTORY OF PRESENT ILLNESS
[] : left basilic fistula [FreeTextEntry1] : Dr. Dempsey 9/18/17 [FreeTextEntry5] : yesterday at 10pm  [FreeTextEntry4] : yesterday  [FreeTextEntry6] : Dr. Koroma

## 2024-08-20 ENCOUNTER — RESULT REVIEW (OUTPATIENT)
Age: 64
End: 2024-08-20

## 2024-08-20 ENCOUNTER — OUTPATIENT (OUTPATIENT)
Dept: OUTPATIENT SERVICES | Facility: HOSPITAL | Age: 64
LOS: 1 days | End: 2024-08-20
Payer: MEDICAID

## 2024-08-20 ENCOUNTER — APPOINTMENT (OUTPATIENT)
Dept: HEMATOLOGY ONCOLOGY | Facility: CLINIC | Age: 64
End: 2024-08-20
Payer: COMMERCIAL

## 2024-08-20 VITALS
RESPIRATION RATE: 16 BRPM | SYSTOLIC BLOOD PRESSURE: 126 MMHG | BODY MASS INDEX: 19.83 KG/M2 | DIASTOLIC BLOOD PRESSURE: 76 MMHG | TEMPERATURE: 98 F | OXYGEN SATURATION: 99 % | WEIGHT: 109.13 LBS | HEART RATE: 82 BPM | HEIGHT: 62.01 IN

## 2024-08-20 DIAGNOSIS — T85.898A OTHER SPECIFIED COMPLICATION OF OTHER INTERNAL PROSTHETIC DEVICES, IMPLANTS AND GRAFTS, INITIAL ENCOUNTER: Chronic | ICD-10-CM

## 2024-08-20 DIAGNOSIS — Z99.2 DEPENDENCE ON RENAL DIALYSIS: Chronic | ICD-10-CM

## 2024-08-20 DIAGNOSIS — D64.9 ANEMIA, UNSPECIFIED: ICD-10-CM

## 2024-08-20 DIAGNOSIS — I77.0 ARTERIOVENOUS FISTULA, ACQUIRED: Chronic | ICD-10-CM

## 2024-08-20 LAB
ALBUMIN SERPL ELPH-MCNC: 4.8 G/DL
ALP BLD-CCNC: 182 U/L
ALT SERPL-CCNC: 7 U/L
ANION GAP SERPL CALC-SCNC: 16 MMOL/L
AST SERPL-CCNC: 14 U/L
BILIRUB SERPL-MCNC: 5.2 MG/DL
BUN SERPL-MCNC: 25 MG/DL
CALCIUM SERPL-MCNC: 9.5 MG/DL
CHLORIDE SERPL-SCNC: 96 MMOL/L
CO2 SERPL-SCNC: 27 MMOL/L
CREAT SERPL-MCNC: 4.47 MG/DL
EGFR: 10 ML/MIN/1.73M2
FOLATE SERPL-MCNC: >20 NG/ML
GLUCOSE SERPL-MCNC: 135 MG/DL
HAPTOGLOB SERPL-MCNC: <20 MG/DL
LDH SERPL-CCNC: 213 U/L
POTASSIUM SERPL-SCNC: 4.7 MMOL/L
PROT SERPL-MCNC: 7.6 G/DL
SODIUM SERPL-SCNC: 140 MMOL/L
VIT B12 SERPL-MCNC: 809 PG/ML

## 2024-08-20 PROCEDURE — 99214 OFFICE O/P EST MOD 30 MIN: CPT

## 2024-08-20 PROCEDURE — 86922 COMPATIBILITY TEST ANTIGLOB: CPT

## 2024-08-20 PROCEDURE — 86900 BLOOD TYPING SEROLOGIC ABO: CPT

## 2024-08-20 PROCEDURE — 86850 RBC ANTIBODY SCREEN: CPT

## 2024-08-20 PROCEDURE — 86901 BLOOD TYPING SEROLOGIC RH(D): CPT

## 2024-08-20 RX ORDER — FEDRATINIB HYDROCHLORIDE 100 MG/1
100 CAPSULE ORAL
Qty: 60 | Refills: 2 | Status: ACTIVE | COMMUNITY
Start: 2024-08-20 | End: 1900-01-01

## 2024-08-20 NOTE — ASSESSMENT
[FreeTextEntry1] : This is a 64 woman with a history of Polycythemia Vera, MAK 2 + since 2012. 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 She is now under my super vision for the polycythemia vera.  Jakafi was initially started at 15mg TIW. Dose was decreased to 10mg TIW due to anemia though that was likely form the lack of danazol. Since then dose was adjusted back to 15mg then to 20mg TIW.  Patient was taken off Jakafi with intent to switch to momelotinib (Ojjaara) 200mg daily in setting of dialysis.  This dose was later decreased to 150mg due to IG complaints, diarrhea but pateint still suffered from these same side effects. In July 2024 patient was instructed to stop Ojaara, now returns for follow up. Only on hydrae 1000mg daily at this time. Recommend patient start Federapinib for the symptoms of the splenomegaly with significant pain.  REnal dose federafinib 200mg (100mg X2 tablets) daily.  Follow up in 1 month after initiation of the medication.  Continue hydroxurea for now. will eventually seek to taper hydoxyurea to off once the federapinib is started.

## 2024-08-20 NOTE — PHYSICAL EXAM
[Normal] : normal appearance, no rash, nodules, vesicles, ulcers, erythema [de-identified] : mild jaundice

## 2024-08-20 NOTE — DISCHARGE NOTE NURSING/CASE MANAGEMENT/SOCIAL WORK - NSDCVIVACCINE_GEN_ALL_CORE_FT
metFORMIN  MG Oral Tablet 24 Hr 360 tablet 3 2/12/2024 --    Sig - Route: Take 2 tablets (1,000 mg total) by mouth 2 (two) times daily with meals. - Oral    Sent to pharmacy as: metFORMIN HCl  MG Oral Tablet Extended Release 24 Hour (Glucophage XR)    E-Prescribing Status: Receipt confirmed by pharmacy (2/12/2024  5:08 PM CST)      Pharmacy    [id number] [place] [facility] [place] [place] [address] AT [facility], [phone], [phone]     
influenza, injectable, quadrivalent, preservative free; 20-Nov-2014 12:44; Aston Ulloa (RN); [employer]; [id number]; IntraMuscular; Deltoid Left.; 0.5 milliLiter(s);   influenza, injectable, quadrivalent, preservative free; 05-Oct-2016 18:15; Rosario James (RN); [employer]; [id number]; IntraMuscular; Deltoid Left.; 0.5 milliLiter(s); VIS (VIS Published: 07-Aug-2015, VIS Presented: 05-Oct-2016);

## 2024-08-20 NOTE — HISTORY OF PRESENT ILLNESS
[de-identified] : Patient feeling better after the cessation of the Ojjarra. Diarrhea completely resolved.  Patient remains on the hydrea. The splenomegally is still prsent in left side but pateint has not actually noticed all that much pain. Hg stable but lower than the time she was on the Ojara.    Hg now 9.6g/dl rather than the previous 10's and 11's on the Ojarra.

## 2024-08-26 NOTE — PRE-OP CHECKLIST - PATIENT PROBLEMS/NEEDS
Patient here at the request of Dr. Phani Vidal for repeat colonoscopy.  His last colonoscopy was in 2021; one adenoma was removed.  The prep was suboptimal.  He has had intermittent reflux symptoms for which he recently started taking OTC omeprazole once daily.  This has been very helpful.  He is not adherent to an antireflux regimen.  His reflux symptoms typically occur in the late afternoon.  He has rare dysphagia to solids approximately 2-3 times per year.  Each episode lasts approximately 5 minutes.  He denies any trigger for his symptoms and he does not need to regurgitate the food bolus.  He had no other GI symptoms currently.  He does use Advil at least a few times per week.  There is no family history of of colon cancer but no family history of colon cancer but his brother had colon polyps..  He states that recent labs were normal aside from "a low blood count".  He states that a recent "stool test" was negative for blood.  He does take a daily aspirin. He has not had a prior upper endoscopy but apparently underwent a barium swallow in 2021 which revealed a small hiatal hernia, and intact surgical C-spine hardware from a previous fusion procedure. Prominent anterior spondylitic change in the lower cervical spine impinging upon the posterior aspect of the cervical esophagus was seen.
Wolof reading/ speaks little English

## 2024-09-09 NOTE — ED ADULT NURSE NOTE - NS ED NURSE LEVEL OF CONSCIOUSNESS MENTAL STATUS
Awake/Alert/Cooperative
You have been evaluated in the Emergency Department today for enteritis.    Please follow up with your primary care physician within two days.    Return to the Emergency Department if you experience worsening or new concerning symptoms.    Thank you for choosing us for your care. Although you have been discharged from the emergency department, this does not mean that you have a "clean bill of health".   If you experience any new or worsening symptoms or if you are concerned you can always come back to the emergency for a re-evaluation.    Some results may not be available at the time of your discharge from the hospital. You can download the   FOLLOW MY HEALTH marcio and have access to these results.

## 2024-09-15 ENCOUNTER — OUTPATIENT (OUTPATIENT)
Dept: OUTPATIENT SERVICES | Facility: HOSPITAL | Age: 64
LOS: 1 days | Discharge: HOME | End: 2024-09-15

## 2024-09-15 DIAGNOSIS — I77.0 ARTERIOVENOUS FISTULA, ACQUIRED: Chronic | ICD-10-CM

## 2024-09-15 DIAGNOSIS — T85.898A OTHER SPECIFIED COMPLICATION OF OTHER INTERNAL PROSTHETIC DEVICES, IMPLANTS AND GRAFTS, INITIAL ENCOUNTER: Chronic | ICD-10-CM

## 2024-09-15 DIAGNOSIS — D64.9 ANEMIA, UNSPECIFIED: ICD-10-CM

## 2024-09-15 DIAGNOSIS — Z99.2 DEPENDENCE ON RENAL DIALYSIS: Chronic | ICD-10-CM

## 2024-09-24 ENCOUNTER — RESULT REVIEW (OUTPATIENT)
Age: 64
End: 2024-09-24

## 2024-09-24 ENCOUNTER — APPOINTMENT (OUTPATIENT)
Dept: HEMATOLOGY ONCOLOGY | Facility: CLINIC | Age: 64
End: 2024-09-24
Payer: COMMERCIAL

## 2024-09-24 ENCOUNTER — OUTPATIENT (OUTPATIENT)
Dept: OUTPATIENT SERVICES | Facility: HOSPITAL | Age: 64
LOS: 1 days | End: 2024-09-24
Payer: MEDICAID

## 2024-09-24 VITALS
OXYGEN SATURATION: 99 % | DIASTOLIC BLOOD PRESSURE: 76 MMHG | BODY MASS INDEX: 20.64 KG/M2 | WEIGHT: 112.88 LBS | HEART RATE: 71 BPM | SYSTOLIC BLOOD PRESSURE: 104 MMHG | TEMPERATURE: 97.3 F | RESPIRATION RATE: 16 BRPM

## 2024-09-24 DIAGNOSIS — Z99.2 DEPENDENCE ON RENAL DIALYSIS: Chronic | ICD-10-CM

## 2024-09-24 DIAGNOSIS — Z87.898 PERSONAL HISTORY OF OTHER SPECIFIED CONDITIONS: ICD-10-CM

## 2024-09-24 DIAGNOSIS — D75.81 MYELOFIBROSIS: ICD-10-CM

## 2024-09-24 DIAGNOSIS — D45 POLYCYTHEMIA VERA: ICD-10-CM

## 2024-09-24 DIAGNOSIS — R52 PAIN, UNSPECIFIED: ICD-10-CM

## 2024-09-24 DIAGNOSIS — I77.0 ARTERIOVENOUS FISTULA, ACQUIRED: Chronic | ICD-10-CM

## 2024-09-24 DIAGNOSIS — T85.898A OTHER SPECIFIED COMPLICATION OF OTHER INTERNAL PROSTHETIC DEVICES, IMPLANTS AND GRAFTS, INITIAL ENCOUNTER: Chronic | ICD-10-CM

## 2024-09-24 DIAGNOSIS — D64.9 ANEMIA, UNSPECIFIED: ICD-10-CM

## 2024-09-24 LAB
BASOPHILS # BLD AUTO: 0.01 K/UL — SIGNIFICANT CHANGE UP (ref 0–0.2)
BASOPHILS NFR BLD AUTO: 0.4 % — SIGNIFICANT CHANGE UP (ref 0–2)
EOSINOPHIL # BLD AUTO: 0.04 K/UL — SIGNIFICANT CHANGE UP (ref 0–0.5)
EOSINOPHIL NFR BLD AUTO: 1.4 % — SIGNIFICANT CHANGE UP (ref 0–6)
FERRITIN SERPL-MCNC: 834 NG/ML
HCT VFR BLD CALC: 26.8 % — LOW (ref 34.5–45)
HGB BLD-MCNC: 8.9 G/DL — LOW (ref 11.5–15.5)
IMM GRANULOCYTES NFR BLD AUTO: 0.7 % — SIGNIFICANT CHANGE UP (ref 0–0.9)
IRON SATN MFR SERPL: 57 %
IRON SERPL-MCNC: 102 UG/DL
LDH SERPL-CCNC: 174 U/L
LYMPHOCYTES # BLD AUTO: 0.6 K/UL — LOW (ref 1–3.3)
LYMPHOCYTES # BLD AUTO: 21.7 % — SIGNIFICANT CHANGE UP (ref 13–44)
MCHC RBC-ENTMCNC: 33.2 G/DL — SIGNIFICANT CHANGE UP (ref 32–36)
MCHC RBC-ENTMCNC: 40.1 PG — HIGH (ref 27–34)
MCV RBC AUTO: 120.7 FL — HIGH (ref 80–100)
MONOCYTES # BLD AUTO: 0.13 K/UL — SIGNIFICANT CHANGE UP (ref 0–0.9)
MONOCYTES NFR BLD AUTO: 4.7 % — SIGNIFICANT CHANGE UP (ref 2–14)
NEUTROPHILS # BLD AUTO: 1.97 K/UL — SIGNIFICANT CHANGE UP (ref 1.8–7.4)
NEUTROPHILS NFR BLD AUTO: 71.1 % — SIGNIFICANT CHANGE UP (ref 43–77)
NRBC # BLD: 0 /100 WBCS — SIGNIFICANT CHANGE UP (ref 0–0)
PLATELET # BLD AUTO: 61 K/UL — LOW (ref 150–400)
RBC # BLD: 2.22 M/UL — LOW (ref 3.8–5.2)
RBC # FLD: 17.2 % — HIGH (ref 10.3–14.5)
RETICS #: 61.1 K/UL — SIGNIFICANT CHANGE UP (ref 25–125)
RETICS/RBC NFR: 2.8 % — HIGH (ref 0.5–2.5)
TIBC SERPL-MCNC: 179 UG/DL
UIBC SERPL-MCNC: 76 UG/DL
WBC # BLD: 2.77 K/UL — LOW (ref 3.8–10.5)
WBC # FLD AUTO: 2.77 K/UL — LOW (ref 3.8–10.5)

## 2024-09-24 PROCEDURE — 86901 BLOOD TYPING SEROLOGIC RH(D): CPT

## 2024-09-24 PROCEDURE — 86922 COMPATIBILITY TEST ANTIGLOB: CPT

## 2024-09-24 PROCEDURE — 99214 OFFICE O/P EST MOD 30 MIN: CPT

## 2024-09-24 PROCEDURE — 86850 RBC ANTIBODY SCREEN: CPT

## 2024-09-24 PROCEDURE — 86900 BLOOD TYPING SEROLOGIC ABO: CPT

## 2024-09-24 PROCEDURE — 86880 COOMBS TEST DIRECT: CPT

## 2024-09-26 NOTE — ASSESSMENT
[FreeTextEntry1] : This is a 64 woman with a history of PV JAK2+ since 2012, splenic vein thrombosis (2015), ESRD on HD (Tu/Th/Sat) via LUE AVF (2/2 chronic GN, started Dec 2017), HTN. She is now under my supervision for the PV.  Jakafi was initially started at 15mg TIW. Dose was decreased to 10mg TIW due to anemia though that was likely form the lack of danazol. Jakafi dose was adjusted back to 15mg then to 20mg TIW. Taken off Jakafi with intent to switch to momelotinib (Ojjaara) 200mg daily in setting of dialysis. This dose was later decreased to 150mg due to GI complaints, diarrhea but she still suffered from these same side effects. In July 2024 patient was instructed to stop momelotinib and was on Hydrea 1000mg qd only. Transitioned to renal dose of fedratinib 200mg (100mg X2 tablets) daily with significant improvement of splenomegaly and stable cytopenias. She has diarrhea with fedratinib but less than with the momelotinib. Advised that she can take just one tablet of loperamide for the diarrhea from fedratinib.   Continue with fedratinib and RTC 3 months.  Patient seen and examined with Dr. Guo.   Stephen Ace M.D. Hematology/Oncology Fellow PGY-6 [facility]   I discussed this patient in a pre-clinic session with the fellow including review of clinical status and last labs.  I also saw the patient and discussed history, completed an exam and discussed the plan together with the fellow.  Total time spent today on this patient   5   minutes.  This is a 64 year old woman with a history of renal failure on HD, PV transformed into myelofibrosis. Patient with severe diarrhea on Ojjara, despite good hematologic response, hg in kiet 11's, patient was forced to discontinue due to intolerable diarrhea. Diarrhea 1-2 times a day now much better than prior.  Patient  now on Fedrapiib Inrebic  2 X100mg tablets daily. Good response, splenomegaly is significantly decreased, patient mostly asymptomatic.  Hg stable at 8.9g/dl.  Follow up in 3 months.

## 2024-09-26 NOTE — PHYSICAL EXAM
[Normal] : normal appearance, no rash, nodules, vesicles, ulcers, erythema [de-identified] : Mildly icteric [de-identified] : Splenomegaly markedly improved, now half a finger width below the costal margin (previously was about 3 finger widths). Normoactive bowel sounds, soft, nontender

## 2024-09-26 NOTE — HISTORY OF PRESENT ILLNESS
[de-identified] : 56yoF with history of PCV diagnosed 5 years ago with splenomegaly, CKD V, HTN presenting today for new patient visit. She states she has been seeing a hematologist Sherri Guzmán in [place] since diagnosis (# [phone]). She had an initial BMBx done she states but does not know results. She also says she was placed on hydroxyurea 2 years ago but stopped taking it 5 months ago as she did not believe this was helping her. There is a history of questionable splenomegaly and ?splenic infarcts associated with PV. She states she has never been on anticoagulation and denies any known thrombotic events.  Denies headache, dizziness, pain, blurry vision, abdominal pain, fevers, rashes. Reports she has been having gradual bl leg swelling. Following with nephrology, no plan for dialysis at this time, had recent renal bx done, which showed globally sclerotic glomeruli and severe interstitial fibrosis and tubular atrophy.  She reports she is usually asymptomatic from PV standpoint and gets phlebotomy 1-2 times a year. Does not know her baseline hgb values.   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 [de-identified] : Following up for post-PV MF. Not transplant eligible due to ESRD. Reporting diarrhea while on fedratinib. Lower frequency 1-2 times per day, but better than her 3-4 episodes of diarrhea per day while on Ojjaara. She tried taking one tablet of Imodium, but she was still having diarrhea that night. She took another tablet the following morning, but she became constipated with no BM for 3-4 days.

## 2024-09-26 NOTE — PHYSICAL EXAM
[Normal] : normal appearance, no rash, nodules, vesicles, ulcers, erythema [de-identified] : Mildly icteric [de-identified] : Splenomegaly markedly improved, now half a finger width below the costal margin (previously was about 3 finger widths). Normoactive bowel sounds, soft, nontender

## 2024-09-26 NOTE — PHYSICAL EXAM
[Normal] : normal appearance, no rash, nodules, vesicles, ulcers, erythema [de-identified] : Mildly icteric [de-identified] : Splenomegaly markedly improved, now half a finger width below the costal margin (previously was about 3 finger widths). Normoactive bowel sounds, soft, nontender

## 2024-09-26 NOTE — HISTORY OF PRESENT ILLNESS
[de-identified] : 56yoF with history of PCV diagnosed 5 years ago with splenomegaly, CKD V, HTN presenting today for new patient visit. She states she has been seeing a hematologist Sherri Guzmán in [place] since diagnosis (# [phone]). She had an initial BMBx done she states but does not know results. She also says she was placed on hydroxyurea 2 years ago but stopped taking it 5 months ago as she did not believe this was helping her. There is a history of questionable splenomegaly and ?splenic infarcts associated with PV. She states she has never been on anticoagulation and denies any known thrombotic events.  Denies headache, dizziness, pain, blurry vision, abdominal pain, fevers, rashes. Reports she has been having gradual bl leg swelling. Following with nephrology, no plan for dialysis at this time, had recent renal bx done, which showed globally sclerotic glomeruli and severe interstitial fibrosis and tubular atrophy.  She reports she is usually asymptomatic from PV standpoint and gets phlebotomy 1-2 times a year. Does not know her baseline hgb values.   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 [de-identified] : Following up for post-PV MF. Not transplant eligible due to ESRD. Reporting diarrhea while on fedratinib. Lower frequency 1-2 times per day, but better than her 3-4 episodes of diarrhea per day while on Ojjaara. She tried taking one tablet of Imodium, but she was still having diarrhea that night. She took another tablet the following morning, but she became constipated with no BM for 3-4 days.

## 2024-09-26 NOTE — REASON FOR VISIT
[Follow-Up Visit] : a follow-up visit for [Myeloproliferative Disorder] : myeloproliferative disorder [[employer] ] : provided by [employer]   [Time Spent: ____ minutes] : Total time spent using  services: [unfilled] minutes. The patient's primary language is not English thus required  services. [Interpreters_IDNumber] : [id number] [Interpreters_FullName] : Opal [TWNoteComboBox1] : Greenlandic

## 2024-09-26 NOTE — HISTORY OF PRESENT ILLNESS
[de-identified] : 56yoF with history of PCV diagnosed 5 years ago with splenomegaly, CKD V, HTN presenting today for new patient visit. She states she has been seeing a hematologist Sherri Guzmán in [place] since diagnosis (# [phone]). She had an initial BMBx done she states but does not know results. She also says she was placed on hydroxyurea 2 years ago but stopped taking it 5 months ago as she did not believe this was helping her. There is a history of questionable splenomegaly and ?splenic infarcts associated with PV. She states she has never been on anticoagulation and denies any known thrombotic events.  Denies headache, dizziness, pain, blurry vision, abdominal pain, fevers, rashes. Reports she has been having gradual bl leg swelling. Following with nephrology, no plan for dialysis at this time, had recent renal bx done, which showed globally sclerotic glomeruli and severe interstitial fibrosis and tubular atrophy.  She reports she is usually asymptomatic from PV standpoint and gets phlebotomy 1-2 times a year. Does not know her baseline hgb values.   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 [de-identified] : Following up for post-PV MF. Not transplant eligible due to ESRD. Reporting diarrhea while on fedratinib. Lower frequency 1-2 times per day, but better than her 3-4 episodes of diarrhea per day while on Ojjaara. She tried taking one tablet of Imodium, but she was still having diarrhea that night. She took another tablet the following morning, but she became constipated with no BM for 3-4 days.

## 2024-09-26 NOTE — REASON FOR VISIT
[Follow-Up Visit] : a follow-up visit for [Myeloproliferative Disorder] : myeloproliferative disorder [[employer] ] : provided by [employer]   [Time Spent: ____ minutes] : Total time spent using  services: [unfilled] minutes. The patient's primary language is not English thus required  services. [Interpreters_IDNumber] : [id number] [Interpreters_FullName] : Opal [TWNoteComboBox1] : Kyrgyz

## 2024-09-26 NOTE — ASSESSMENT
[FreeTextEntry1] : This is a 64 woman with a history of PV JAK2+ since 2012, splenic vein thrombosis (2015), ESRD on HD (Tu/Th/Sat) via LUE AVF (2/2 chronic GN, started Dec 2017), HTN. She is now under my supervision for the PV.  Jakafi was initially started at 15mg TIW. Dose was decreased to 10mg TIW due to anemia though that was likely form the lack of danazol. Jakafi dose was adjusted back to 15mg then to 20mg TIW. Taken off Jakafi with intent to switch to momelotinib (Ojjaara) 200mg daily in setting of dialysis. This dose was later decreased to 150mg due to GI complaints, diarrhea but she still suffered from these same side effects. In July 2024 patient was instructed to stop momelotinib and was on Hydrea 1000mg qd only. Transitioned to renal dose of fedratinib 200mg (100mg X2 tablets) daily with significant improvement of splenomegaly and stable cytopenias. She has diarrhea with fedratinib but less than with the momelotinib. Advised that she can take just one tablet of loperamide for the diarrhea from fedratinib.   Continue with fedratinib and RTC 3 months.  Patient seen and examined with Dr. Guo.   Stephen Ace M.D. Hematology/Oncology Fellow PGY-6 [facility]   I discussed this patient in a pre-clinic session with the fellow including review of clinical status and last labs.  I also saw the patient and discussed history, completed an exam and discussed the plan together with the fellow.  Total time spent today on this patient   5   minutes.  This is a 64 year old woman with a history of renal failure on HD, PV transformed into myelofibrosis. Patient with severe diarrhea on Ojjara, despite good hematologic response, hg in kiet 11's, patient was forced to discontinue due to intolerable diarrhea. Diarrhea 1-2 times a day now much better than prior.  Patient  now on Fedrapiib Inrebic  2 X100mg tablets daily. Good response, splenomegaly is significantly decreased, patient mostly asymptomatic.  Hg stable at 8.9g/dl.  Follow up in 3 months.

## 2024-09-26 NOTE — REASON FOR VISIT
[Follow-Up Visit] : a follow-up visit for [Myeloproliferative Disorder] : myeloproliferative disorder [[employer] ] : provided by [employer]   [Time Spent: ____ minutes] : Total time spent using  services: [unfilled] minutes. The patient's primary language is not English thus required  services. [Interpreters_IDNumber] : [id number] [Interpreters_FullName] : Opal [TWNoteComboBox1] : Slovak

## 2024-09-26 NOTE — ASSESSMENT
[FreeTextEntry1] : This is a 64 woman with a history of PV JAK2+ since 2012, splenic vein thrombosis (2015), ESRD on HD (Tu/Th/Sat) via LUE AVF (2/2 chronic GN, started Dec 2017), HTN. She is now under my supervision for the PV.  Jakafi was initially started at 15mg TIW. Dose was decreased to 10mg TIW due to anemia though that was likely form the lack of danazol. Jakafi dose was adjusted back to 15mg then to 20mg TIW. Taken off Jakafi with intent to switch to momelotinib (Ojjaara) 200mg daily in setting of dialysis. This dose was later decreased to 150mg due to GI complaints, diarrhea but she still suffered from these same side effects. In July 2024 patient was instructed to stop momelotinib and was on Hydrea 1000mg qd only. Transitioned to renal dose of fedratinib 200mg (100mg X2 tablets) daily with significant improvement of splenomegaly and stable cytopenias. She has diarrhea with fedratinib but less than with the momelotinib. Advised that she can take just one tablet of loperamide for the diarrhea from fedratinib.   Continue with fedratinib and RTC 3 months.  Patient seen and examined with Dr. Guo.   Stephen Ace M.D. Hematology/Oncology Fellow PGY-6 [facility]   I discussed this patient in a pre-clinic session with the fellow including review of clinical status and last labs.  I also saw the patient and discussed history, completed an exam and discussed the plan together with the fellow.  Total time spent today on this patient   5   minutes.  This is a 64 year old woman with a history of renal failure on HD, PV transformed into myelofibrosis. Patient with severe diarrhea on Ojjara, despite good hematologic response, hg in kiet 11's, patient was forced to discontinue due to intolerable diarrhea. Diarrhea 1-2 times a day now much better than prior.  Patient  now on Fedrapiib Inrebic  2 X100mg tablets daily. Good response, splenomegaly is significantly decreased, patient mostly asymptomatic.  Hg stable at 8.9g/dl.  Follow up in 3 months.

## 2024-09-27 NOTE — PATIENT PROFILE ADULT - LANGUAGE ASSISTANCE NEEDED
Chief Complaint   Patient presents with    RECHECK     Return Glomerular       /75   Pulse 62   Temp 98.2  F (36.8  C) (Oral)   Wt 58.3 kg (128 lb 9.6 oz)   SpO2 97%   BMI 22.07 kg/m      Marc Escamilla on 9/27/2024 at 12:26 PM     No-Patient/Caregiver offered and refused free interpretation services.

## 2024-09-30 ENCOUNTER — APPOINTMENT (OUTPATIENT)
Dept: VASCULAR SURGERY | Facility: CLINIC | Age: 64
End: 2024-09-30
Payer: COMMERCIAL

## 2024-09-30 ENCOUNTER — APPOINTMENT (OUTPATIENT)
Dept: VASCULAR SURGERY | Facility: CLINIC | Age: 64
End: 2024-09-30
Payer: MEDICAID

## 2024-09-30 VITALS
WEIGHT: 110.23 LBS | SYSTOLIC BLOOD PRESSURE: 123 MMHG | HEART RATE: 79 BPM | DIASTOLIC BLOOD PRESSURE: 73 MMHG | HEIGHT: 62 IN | BODY MASS INDEX: 20.28 KG/M2

## 2024-09-30 PROCEDURE — 99214 OFFICE O/P EST MOD 30 MIN: CPT

## 2024-09-30 PROCEDURE — 93990 DOPPLER FLOW TESTING: CPT

## 2024-09-30 PROCEDURE — G2211 COMPLEX E/M VISIT ADD ON: CPT

## 2024-09-30 NOTE — HISTORY OF PRESENT ILLNESS
[FreeTextEntry1] : Patient complaining of prolonged bleeding after dialysis from the left arm AV fistula along with feeling of pain in the left neck and supraclavicular area.  Patient also complains of numbness in the right hand the right foot. [] : left brachiocephalic fistula

## 2024-09-30 NOTE — ASSESSMENT
[FreeTextEntry1] : Patient with renal failure on dialysis.  No evidence of ischemia of the right upper or lower extremity.  Suspect the radiculopathy.  Patient complains of right-sided neck pain.  Recommend neurology evaluation.  Patient with prolonged bleeding of the left arm AV fistula postdialysis.  Recommend left upper extremity fistulogram

## 2024-09-30 NOTE — PHYSICAL EXAM
[Normal] : normal rate, regular rhythm, normal S1/S2, no murmur [Redness surrounding] : no redness surrounding [Pulsatile Thrill] : pulsatile thrill [Aneurysm] : aneurysm [2+] : left 2+

## 2024-10-15 ENCOUNTER — OUTPATIENT (OUTPATIENT)
Dept: OUTPATIENT SERVICES | Facility: HOSPITAL | Age: 64
LOS: 1 days | End: 2024-10-15
Payer: SELF-PAY

## 2024-10-15 ENCOUNTER — APPOINTMENT (OUTPATIENT)
Dept: INTERNAL MEDICINE | Facility: CLINIC | Age: 64
End: 2024-10-15
Payer: COMMERCIAL

## 2024-10-15 VITALS
OXYGEN SATURATION: 97 % | WEIGHT: 108 LBS | SYSTOLIC BLOOD PRESSURE: 98 MMHG | HEIGHT: 62 IN | HEART RATE: 84 BPM | DIASTOLIC BLOOD PRESSURE: 54 MMHG | BODY MASS INDEX: 19.88 KG/M2

## 2024-10-15 VITALS — SYSTOLIC BLOOD PRESSURE: 100 MMHG | DIASTOLIC BLOOD PRESSURE: 60 MMHG

## 2024-10-15 DIAGNOSIS — Z01.818 ENCOUNTER FOR OTHER PREPROCEDURAL EXAMINATION: ICD-10-CM

## 2024-10-15 DIAGNOSIS — I10 ESSENTIAL (PRIMARY) HYPERTENSION: ICD-10-CM

## 2024-10-15 PROCEDURE — G0463: CPT

## 2024-10-15 PROCEDURE — 99213 OFFICE O/P EST LOW 20 MIN: CPT | Mod: GE

## 2024-10-21 DIAGNOSIS — Z01.818 ENCOUNTER FOR OTHER PREPROCEDURAL EXAMINATION: ICD-10-CM

## 2024-10-22 ENCOUNTER — APPOINTMENT (OUTPATIENT)
Dept: ENDOVASCULAR SURGERY | Facility: CLINIC | Age: 64
End: 2024-10-22
Payer: MEDICAID

## 2024-10-22 ENCOUNTER — RESULT REVIEW (OUTPATIENT)
Age: 64
End: 2024-10-22

## 2024-10-22 VITALS
SYSTOLIC BLOOD PRESSURE: 87 MMHG | HEIGHT: 62 IN | TEMPERATURE: 98 F | WEIGHT: 110 LBS | BODY MASS INDEX: 20.24 KG/M2 | OXYGEN SATURATION: 100 % | DIASTOLIC BLOOD PRESSURE: 56 MMHG | HEART RATE: 76 BPM | RESPIRATION RATE: 20 BRPM

## 2024-10-22 PROCEDURE — 99213 OFFICE O/P EST LOW 20 MIN: CPT | Mod: 25

## 2024-10-22 PROCEDURE — 36907Z: CUSTOM | Mod: 59

## 2024-10-22 PROCEDURE — 36902Z: CUSTOM

## 2024-10-23 ENCOUNTER — INPATIENT (INPATIENT)
Facility: HOSPITAL | Age: 64
LOS: 5 days | Discharge: HOME | DRG: 864 | End: 2024-10-29
Attending: STUDENT IN AN ORGANIZED HEALTH CARE EDUCATION/TRAINING PROGRAM | Admitting: HOSPITALIST
Payer: MEDICAID

## 2024-10-23 VITALS
SYSTOLIC BLOOD PRESSURE: 102 MMHG | OXYGEN SATURATION: 97 % | TEMPERATURE: 99 F | RESPIRATION RATE: 20 BRPM | WEIGHT: 117.95 LBS | HEART RATE: 96 BPM | HEIGHT: 62 IN | DIASTOLIC BLOOD PRESSURE: 64 MMHG

## 2024-10-23 DIAGNOSIS — T85.898A OTHER SPECIFIED COMPLICATION OF OTHER INTERNAL PROSTHETIC DEVICES, IMPLANTS AND GRAFTS, INITIAL ENCOUNTER: Chronic | ICD-10-CM

## 2024-10-23 DIAGNOSIS — I77.0 ARTERIOVENOUS FISTULA, ACQUIRED: Chronic | ICD-10-CM

## 2024-10-23 DIAGNOSIS — Z99.2 DEPENDENCE ON RENAL DIALYSIS: Chronic | ICD-10-CM

## 2024-10-23 LAB
ALBUMIN SERPL ELPH-MCNC: 4.1 G/DL — SIGNIFICANT CHANGE UP (ref 3.3–5)
ALP SERPL-CCNC: 124 U/L — HIGH (ref 40–120)
ALT FLD-CCNC: 8 U/L — LOW (ref 10–45)
ANION GAP SERPL CALC-SCNC: 17 MMOL/L — SIGNIFICANT CHANGE UP (ref 5–17)
ANISOCYTOSIS BLD QL: SLIGHT — SIGNIFICANT CHANGE UP
AST SERPL-CCNC: 12 U/L — SIGNIFICANT CHANGE UP (ref 10–40)
BASOPHILS # BLD AUTO: 0.02 K/UL — SIGNIFICANT CHANGE UP (ref 0–0.2)
BASOPHILS NFR BLD AUTO: 0.9 % — SIGNIFICANT CHANGE UP (ref 0–2)
BILIRUB SERPL-MCNC: 5.5 MG/DL — HIGH (ref 0.2–1.2)
BUN SERPL-MCNC: 62 MG/DL — HIGH (ref 7–23)
CALCIUM SERPL-MCNC: 8.9 MG/DL — SIGNIFICANT CHANGE UP (ref 8.4–10.5)
CHLORIDE SERPL-SCNC: 95 MMOL/L — LOW (ref 96–108)
CO2 SERPL-SCNC: 19 MMOL/L — LOW (ref 22–31)
CREAT SERPL-MCNC: 6.86 MG/DL — HIGH (ref 0.5–1.3)
DACRYOCYTES BLD QL SMEAR: SLIGHT — SIGNIFICANT CHANGE UP
EGFR: 6 ML/MIN/1.73M2 — LOW
EOSINOPHIL # BLD AUTO: 0.02 K/UL — SIGNIFICANT CHANGE UP (ref 0–0.5)
EOSINOPHIL NFR BLD AUTO: 0.9 % — SIGNIFICANT CHANGE UP (ref 0–6)
FLUAV AG NPH QL: SIGNIFICANT CHANGE UP
FLUBV AG NPH QL: SIGNIFICANT CHANGE UP
GLUCOSE SERPL-MCNC: 102 MG/DL — HIGH (ref 70–99)
HBV SURFACE AG SER-ACNC: SIGNIFICANT CHANGE UP
HCT VFR BLD CALC: 20.7 % — CRITICAL LOW (ref 34.5–45)
HGB BLD-MCNC: 7 G/DL — CRITICAL LOW (ref 11.5–15.5)
LIDOCAIN IGE QN: 41 U/L — SIGNIFICANT CHANGE UP (ref 7–60)
LYMPHOCYTES # BLD AUTO: 0.24 K/UL — LOW (ref 1–3.3)
LYMPHOCYTES # BLD AUTO: 13 % — SIGNIFICANT CHANGE UP (ref 13–44)
MACROCYTES BLD QL: SLIGHT — SIGNIFICANT CHANGE UP
MAGNESIUM SERPL-MCNC: 1.8 MG/DL — SIGNIFICANT CHANGE UP (ref 1.6–2.6)
MANUAL SMEAR VERIFICATION: SIGNIFICANT CHANGE UP
MCHC RBC-ENTMCNC: 33.8 GM/DL — SIGNIFICANT CHANGE UP (ref 32–36)
MCHC RBC-ENTMCNC: 40.9 PG — HIGH (ref 27–34)
MCV RBC AUTO: 121.1 FL — HIGH (ref 80–100)
METAMYELOCYTES # FLD: 0.9 % — HIGH (ref 0–0)
MONOCYTES # BLD AUTO: 0.1 K/UL — SIGNIFICANT CHANGE UP (ref 0–0.9)
MONOCYTES NFR BLD AUTO: 5.2 % — SIGNIFICANT CHANGE UP (ref 2–14)
NEUTROPHILS # BLD AUTO: 1.48 K/UL — LOW (ref 1.8–7.4)
NEUTROPHILS NFR BLD AUTO: 78.2 % — HIGH (ref 43–77)
NEUTS BAND # BLD: 0.9 % — SIGNIFICANT CHANGE UP (ref 0–8)
PLAT MORPH BLD: NORMAL — SIGNIFICANT CHANGE UP
PLATELET # BLD AUTO: 54 K/UL — LOW (ref 150–400)
POIKILOCYTOSIS BLD QL AUTO: SLIGHT — SIGNIFICANT CHANGE UP
POTASSIUM SERPL-MCNC: 5.6 MMOL/L — HIGH (ref 3.5–5.3)
POTASSIUM SERPL-SCNC: 5.6 MMOL/L — HIGH (ref 3.5–5.3)
PROT SERPL-MCNC: 6.6 G/DL — SIGNIFICANT CHANGE UP (ref 6–8.3)
RBC # BLD: 1.71 M/UL — LOW (ref 3.8–5.2)
RBC # FLD: 16.2 % — HIGH (ref 10.3–14.5)
RBC BLD AUTO: ABNORMAL
RSV RNA NPH QL NAA+NON-PROBE: SIGNIFICANT CHANGE UP
SARS-COV-2 RNA SPEC QL NAA+PROBE: SIGNIFICANT CHANGE UP
SODIUM SERPL-SCNC: 131 MMOL/L — LOW (ref 135–145)
WBC # BLD: 1.87 K/UL — LOW (ref 3.8–10.5)
WBC # FLD AUTO: 1.87 K/UL — LOW (ref 3.8–10.5)

## 2024-10-23 PROCEDURE — 74177 CT ABD & PELVIS W/CONTRAST: CPT | Mod: 26,MC

## 2024-10-23 PROCEDURE — 71045 X-RAY EXAM CHEST 1 VIEW: CPT | Mod: 26

## 2024-10-23 PROCEDURE — 99285 EMERGENCY DEPT VISIT HI MDM: CPT

## 2024-10-23 RX ORDER — ONDANSETRON HYDROCHLORIDE 2 MG/ML
4 INJECTION, SOLUTION INTRAMUSCULAR; INTRAVENOUS ONCE
Refills: 0 | Status: COMPLETED | OUTPATIENT
Start: 2024-10-23 | End: 2024-10-23

## 2024-10-23 RX ORDER — SODIUM CHLORIDE 9 MG/ML
500 INJECTION, SOLUTION INTRAMUSCULAR; INTRAVENOUS; SUBCUTANEOUS ONCE
Refills: 0 | Status: COMPLETED | OUTPATIENT
Start: 2024-10-23 | End: 2024-10-23

## 2024-10-23 RX ORDER — ACETAMINOPHEN 500 MG
1000 TABLET ORAL ONCE
Refills: 0 | Status: COMPLETED | OUTPATIENT
Start: 2024-10-23 | End: 2024-10-23

## 2024-10-23 RX ORDER — PIPERACILLIN AND TAZOBACTAM .5; 4 G/20ML; G/20ML
3.38 INJECTION, POWDER, LYOPHILIZED, FOR SOLUTION INTRAVENOUS ONCE
Refills: 0 | Status: COMPLETED | OUTPATIENT
Start: 2024-10-23 | End: 2024-10-23

## 2024-10-23 RX ORDER — VANCOMYCIN HYDROCHLORIDE 50 MG/ML
1000 KIT ORAL ONCE
Refills: 0 | Status: COMPLETED | OUTPATIENT
Start: 2024-10-23 | End: 2024-10-23

## 2024-10-23 RX ADMIN — Medication 400 MILLIGRAM(S): at 20:36

## 2024-10-23 RX ADMIN — PIPERACILLIN AND TAZOBACTAM 200 GRAM(S): .5; 4 INJECTION, POWDER, LYOPHILIZED, FOR SOLUTION INTRAVENOUS at 21:02

## 2024-10-23 RX ADMIN — ONDANSETRON HYDROCHLORIDE 4 MILLIGRAM(S): 2 INJECTION, SOLUTION INTRAMUSCULAR; INTRAVENOUS at 21:05

## 2024-10-23 RX ADMIN — VANCOMYCIN HYDROCHLORIDE 250 MILLIGRAM(S): KIT at 21:45

## 2024-10-23 RX ADMIN — SODIUM CHLORIDE 500 MILLILITER(S): 9 INJECTION, SOLUTION INTRAMUSCULAR; INTRAVENOUS; SUBCUTANEOUS at 20:37

## 2024-10-23 NOTE — ED PROVIDER NOTE - NSICDXPASTMEDICALHX_GEN_ALL_CORE_FT
PAST MEDICAL HISTORY:  Anemia secondary to renal failure     ESRD on dialysis     History of myelofibrosis     History of myelofibrosis     Hypertension     Pancreatic cyst     Peptic ulcer disease     Polycythemia     Polycythemia vera and secondary myelofibrosis    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

## 2024-10-23 NOTE — ED PROVIDER NOTE - OBJECTIVE STATEMENT
64 y F history of ESRD on HD MWF, polycythemia presenting from HD today due to generalized fatigue and BP in 80s. Did not complete HD today. States got flushot on 10/21 and has had decreased appetite since then. Had fistula procedure yesterday and 2 episodes of vomiting today. Denies any chest pain, shortness of breath, constipation, diarrhea, fevers, urinary symptoms. Does endorse chills and abdominal pain this morning after vomiting.

## 2024-10-23 NOTE — ED ADULT NURSE REASSESSMENT NOTE - NS ED NURSE REASSESS COMMENT FT1
Receiving RN Zuleyka made aware that patient is pending CXR and port is to be accessed per MD after CXR is completed.

## 2024-10-23 NOTE — ED PROVIDER NOTE - CLINICAL SUMMARY MEDICAL DECISION MAKING FREE TEXT BOX
Attending Haile: 65 y/o F w/ PMH of anemia, ESRD on HD MWF, HTN, PUD, myelofibrosis, polycythemia presenting w/ weakness. Reports on Monday received her flu shot. Yesterday was not feeling well, was not eating or drinking much. Had a procedure to check her fistula yesterday and was told it was normal. Went to HD today and was very weak. Reportedly had BP in the 80s so HD was not performed. Did have full HD session on Monday. Reporting now overall feeling weak and having body aches. Had been feeling nauseous when trying to eat so was not eating, no current nausea now. Still makes some urine. Denies fevers, chills, headache, dizziness, blurred vision, chest pain, cough, shortness of breath, abdominal pain, diarrhea, constipation, urinary symptoms, MSK pain, rash. Pt overall no acute distress. Lungs clear. HR regular. Abd nondistended/soft/nontender. LUE fistula w/ bruit and palpable thrill. Lips dry. Will obtain screening labs. Eval for metabolic abnormalities. Eval for anemia. Eval for infectious process. Possible viral illness. Possible post vaccine malaise. Plan for labs, imaging, EKG, IVF, meds. Will reassess the need for additional interventions as clinically warranted. Refer to any progress notes for updates on clinical course and as a continuation of this MDM. Attending Haile: 65 y/o F w/ PMH of anemia, ESRD on HD MWF, HTN, PUD, myelofibrosis, polycythemia presenting w/ weakness. Reports on Monday received her flu shot. Yesterday was not feeling well, was not eating or drinking much. Had a procedure to check her fistula yesterday and was told it was normal. Went to HD today and was very weak. Reportedly had BP in the 80s so HD was not performed. Did have full HD session on Monday. Reporting now overall feeling weak and having body aches. Had been feeling nauseous when trying to eat so was not eating, no current nausea now. Still makes some urine. Denies fevers, chills, headache, dizziness, blurred vision, chest pain, cough, shortness of breath, abdominal pain, diarrhea, constipation, urinary symptoms, MSK pain, rash. Pt overall no acute distress. Lungs clear. HR regular. Abd nondistended/soft/nontender. LUE fistula w/ bruit and palpable thrill. Lips dry. Will obtain screening labs. Eval for metabolic abnormalities. Eval for anemia. Eval for infectious process. Possible viral illness. Possible post vaccine malaise. Plan for labs, imaging, EKG, IVF, meds. Will reassess the need for additional interventions as clinically warranted. Refer to any progress notes for updates on clinical course and as a continuation of this MDM.    I, Dr. Mauricio Be, independently interpreted the EKG which showed NSR at a rate of 98.

## 2024-10-23 NOTE — ED PROVIDER NOTE - ATTENDING CONTRIBUTION TO CARE
Attending Haile: I performed a history and physical exam of the patient and discussed their management with the resident/fellow/student. I have reviewed the resident/fellow/student note and agree with the documented findings and plan of care, except as noted. I have personally performed a substantive portion of the visit including all aspects of the medical decision making. My medical decision making and observations are found above. Please refer to any progress notes for updates on clinical course. My notes supersedes the above resident/fellow/student note in case of discrepancy

## 2024-10-23 NOTE — ED ADULT NURSE NOTE - ISOLATION TYPE:
How Severe Is Your Skin Lesion?: mild Has Your Skin Lesion Been Treated?: not been treated Is This A New Presentation, Or A Follow-Up?: Skin Lesion None

## 2024-10-23 NOTE — ED ADULT NURSE REASSESSMENT NOTE - NS ED NURSE REASSESS COMMENT FT1
RN  unable to obtain peripheral IV access. Patient has port to R chest wall, per MD Be, CXR to be obtained prior to accessing port.

## 2024-10-23 NOTE — ED ADULT TRIAGE NOTE - CHIEF COMPLAINT QUOTE
Sent from dialysis due to generalized weakness, body aches and hypotension. BP was "80s/50s" when she arrived at the dialysis center; never received dialysis today.

## 2024-10-23 NOTE — ED PROVIDER NOTE - PROGRESS NOTE DETAILS
Attending Haile: nephrology aware, pending their evaluation. SBP now in 80s. Hb of 7. Signed out pending transfusion, CT read, nephro eval. f/u w/ nephro regarding mild hyperK if they would like pt shifted or will address w/ HD in AM. pt will require admission.

## 2024-10-24 DIAGNOSIS — A41.9 SEPSIS, UNSPECIFIED ORGANISM: ICD-10-CM

## 2024-10-24 DIAGNOSIS — K76.6 PORTAL HYPERTENSION: ICD-10-CM

## 2024-10-24 DIAGNOSIS — K21.9 GASTRO-ESOPHAGEAL REFLUX DISEASE WITHOUT ESOPHAGITIS: ICD-10-CM

## 2024-10-24 DIAGNOSIS — Z29.9 ENCOUNTER FOR PROPHYLACTIC MEASURES, UNSPECIFIED: ICD-10-CM

## 2024-10-24 DIAGNOSIS — E87.5 HYPERKALEMIA: ICD-10-CM

## 2024-10-24 DIAGNOSIS — D64.9 ANEMIA, UNSPECIFIED: ICD-10-CM

## 2024-10-24 DIAGNOSIS — R50.9 FEVER, UNSPECIFIED: ICD-10-CM

## 2024-10-24 DIAGNOSIS — N18.6 END STAGE RENAL DISEASE: ICD-10-CM

## 2024-10-24 DIAGNOSIS — Z86.2 PERSONAL HISTORY OF DISEASES OF THE BLOOD AND BLOOD-FORMING ORGANS AND CERTAIN DISORDERS INVOLVING THE IMMUNE MECHANISM: ICD-10-CM

## 2024-10-24 DIAGNOSIS — D75.81 MYELOFIBROSIS: ICD-10-CM

## 2024-10-24 DIAGNOSIS — D61.818 OTHER PANCYTOPENIA: ICD-10-CM

## 2024-10-24 PROBLEM — Z87.39 PERSONAL HISTORY OF OTHER DISEASES OF THE MUSCULOSKELETAL SYSTEM AND CONNECTIVE TISSUE: Chronic | Status: INACTIVE | Noted: 2022-09-30 | Resolved: 2024-10-24

## 2024-10-24 LAB
ADD ON TEST-SPECIMEN IN LAB: SIGNIFICANT CHANGE UP
ALBUMIN SERPL ELPH-MCNC: 3.6 G/DL — SIGNIFICANT CHANGE UP (ref 3.3–5)
ALP SERPL-CCNC: 112 U/L — SIGNIFICANT CHANGE UP (ref 40–120)
ALT FLD-CCNC: 8 U/L — LOW (ref 10–45)
ANION GAP SERPL CALC-SCNC: 16 MMOL/L — SIGNIFICANT CHANGE UP (ref 5–17)
APPEARANCE UR: CLEAR — SIGNIFICANT CHANGE UP
APTT BLD: 31.7 SEC — SIGNIFICANT CHANGE UP (ref 24.5–35.6)
APTT BLD: 35 SEC — SIGNIFICANT CHANGE UP (ref 24.5–35.6)
AST SERPL-CCNC: 11 U/L — SIGNIFICANT CHANGE UP (ref 10–40)
BACTERIA # UR AUTO: NEGATIVE /HPF — SIGNIFICANT CHANGE UP
BASOPHILS # BLD AUTO: 0 K/UL — SIGNIFICANT CHANGE UP (ref 0–0.2)
BASOPHILS NFR BLD AUTO: 0 % — SIGNIFICANT CHANGE UP (ref 0–2)
BILIRUB DIRECT SERPL-MCNC: 0.4 MG/DL — HIGH (ref 0–0.3)
BILIRUB SERPL-MCNC: 5.4 MG/DL — HIGH (ref 0.2–1.2)
BILIRUB UR-MCNC: NEGATIVE — SIGNIFICANT CHANGE UP
BUN SERPL-MCNC: 67 MG/DL — HIGH (ref 7–23)
CALCIUM SERPL-MCNC: 8.1 MG/DL — LOW (ref 8.4–10.5)
CALCIUM SERPL-MCNC: 8.3 MG/DL — LOW (ref 8.4–10.5)
CAST: 0 /LPF — SIGNIFICANT CHANGE UP (ref 0–4)
CHLORIDE SERPL-SCNC: 91 MMOL/L — LOW (ref 96–108)
CO2 SERPL-SCNC: 18 MMOL/L — LOW (ref 22–31)
COLOR SPEC: YELLOW — SIGNIFICANT CHANGE UP
CREAT SERPL-MCNC: 7.15 MG/DL — HIGH (ref 0.5–1.3)
DIFF PNL FLD: NEGATIVE — SIGNIFICANT CHANGE UP
EGFR: 6 ML/MIN/1.73M2 — LOW
EOSINOPHIL # BLD AUTO: 0.02 K/UL — SIGNIFICANT CHANGE UP (ref 0–0.5)
EOSINOPHIL NFR BLD AUTO: 1.9 % — SIGNIFICANT CHANGE UP (ref 0–6)
FERRITIN SERPL-MCNC: 633 NG/ML — HIGH (ref 13–330)
GAS PNL BLDV: SIGNIFICANT CHANGE UP
GLUCOSE SERPL-MCNC: 138 MG/DL — HIGH (ref 70–99)
GLUCOSE UR QL: 100 MG/DL
HBV SURFACE AB SER-ACNC: REACTIVE
HBV SURFACE AB SER-ACNC: REACTIVE
HCT VFR BLD CALC: 21.1 % — LOW (ref 34.5–45)
HCT VFR BLD CALC: 21.3 % — LOW (ref 34.5–45)
HCV AB S/CO SERPL IA: 0.06 S/CO — SIGNIFICANT CHANGE UP
HCV AB SERPL-IMP: SIGNIFICANT CHANGE UP
HGB BLD-MCNC: 7.1 G/DL — LOW (ref 11.5–15.5)
HGB BLD-MCNC: 7.2 G/DL — LOW (ref 11.5–15.5)
INR BLD: 1.37 RATIO — HIGH (ref 0.85–1.16)
INR BLD: 1.38 RATIO — HIGH (ref 0.85–1.16)
IRON SATN MFR SERPL: 42 % — SIGNIFICANT CHANGE UP (ref 14–50)
IRON SATN MFR SERPL: 48 UG/DL — SIGNIFICANT CHANGE UP (ref 30–160)
KETONES UR-MCNC: NEGATIVE MG/DL — SIGNIFICANT CHANGE UP
LDH SERPL L TO P-CCNC: 159 U/L — SIGNIFICANT CHANGE UP (ref 50–242)
LEUKOCYTE ESTERASE UR-ACNC: NEGATIVE — SIGNIFICANT CHANGE UP
LYMPHOCYTES # BLD AUTO: 0.48 K/UL — LOW (ref 1–3.3)
LYMPHOCYTES # BLD AUTO: 36.6 % — SIGNIFICANT CHANGE UP (ref 13–44)
MAGNESIUM SERPL-MCNC: 1.8 MG/DL — SIGNIFICANT CHANGE UP (ref 1.6–2.6)
MANUAL SMEAR VERIFICATION: SIGNIFICANT CHANGE UP
MCHC RBC-ENTMCNC: 33.3 GM/DL — SIGNIFICANT CHANGE UP (ref 32–36)
MCHC RBC-ENTMCNC: 34.1 GM/DL — SIGNIFICANT CHANGE UP (ref 32–36)
MCHC RBC-ENTMCNC: 37.9 PG — HIGH (ref 27–34)
MCHC RBC-ENTMCNC: 38.2 PG — HIGH (ref 27–34)
MCV RBC AUTO: 111.1 FL — HIGH (ref 80–100)
MCV RBC AUTO: 114.5 FL — HIGH (ref 80–100)
MONOCYTES # BLD AUTO: 0.02 K/UL — SIGNIFICANT CHANGE UP (ref 0–0.9)
MONOCYTES NFR BLD AUTO: 1.9 % — LOW (ref 2–14)
NEUTROPHILS # BLD AUTO: 0.78 K/UL — LOW (ref 1.8–7.4)
NEUTROPHILS NFR BLD AUTO: 59.6 % — SIGNIFICANT CHANGE UP (ref 43–77)
NITRITE UR-MCNC: NEGATIVE — SIGNIFICANT CHANGE UP
NRBC # BLD: 0 /100 WBCS — SIGNIFICANT CHANGE UP (ref 0–0)
NRBC # BLD: 0 /100 WBCS — SIGNIFICANT CHANGE UP (ref 0–0)
PH UR: 8.5 (ref 5–8)
PHOSPHATE SERPL-MCNC: 5 MG/DL — HIGH (ref 2.5–4.5)
PLAT MORPH BLD: NORMAL — SIGNIFICANT CHANGE UP
PLATELET # BLD AUTO: 41 K/UL — LOW (ref 150–400)
PLATELET # BLD AUTO: 51 K/UL — LOW (ref 150–400)
POTASSIUM SERPL-MCNC: 5.1 MMOL/L — SIGNIFICANT CHANGE UP (ref 3.5–5.3)
POTASSIUM SERPL-SCNC: 5.1 MMOL/L — SIGNIFICANT CHANGE UP (ref 3.5–5.3)
PROT SERPL-MCNC: 5.9 G/DL — LOW (ref 6–8.3)
PROT UR-MCNC: 100 MG/DL
PROTHROM AB SERPL-ACNC: 15.7 SEC — HIGH (ref 9.9–13.4)
PROTHROM AB SERPL-ACNC: 15.7 SEC — HIGH (ref 9.9–13.4)
PTH-INTACT FLD-MCNC: 225 PG/ML — HIGH (ref 15–65)
RBC # BLD: 1.86 M/UL — LOW (ref 3.8–5.2)
RBC # BLD: 1.9 M/UL — LOW (ref 3.8–5.2)
RBC # FLD: 21.7 % — HIGH (ref 10.3–14.5)
RBC # FLD: SIGNIFICANT CHANGE UP (ref 10.3–14.5)
RBC BLD AUTO: SIGNIFICANT CHANGE UP
RBC CASTS # UR COMP ASSIST: 0 /HPF — SIGNIFICANT CHANGE UP (ref 0–4)
RETICS #: 48.2 K/UL — SIGNIFICANT CHANGE UP (ref 25–125)
RETICS/RBC NFR: 2.6 % — HIGH (ref 0.5–2.5)
SODIUM SERPL-SCNC: 125 MMOL/L — LOW (ref 135–145)
SP GR SPEC: 1.02 — SIGNIFICANT CHANGE UP (ref 1–1.03)
SQUAMOUS # UR AUTO: 1 /HPF — SIGNIFICANT CHANGE UP (ref 0–5)
TIBC SERPL-MCNC: 116 UG/DL — LOW (ref 220–430)
TRANSFERRIN SERPL-MCNC: 109 MG/DL — LOW (ref 200–360)
TSH SERPL-MCNC: 1.02 UIU/ML — SIGNIFICANT CHANGE UP (ref 0.27–4.2)
UIBC SERPL-MCNC: 67 UG/DL — LOW (ref 110–370)
UROBILINOGEN FLD QL: 1 MG/DL — SIGNIFICANT CHANGE UP (ref 0.2–1)
WBC # BLD: 1.18 K/UL — LOW (ref 3.8–10.5)
WBC # BLD: 1.31 K/UL — LOW (ref 3.8–10.5)
WBC # FLD AUTO: 1.18 K/UL — LOW (ref 3.8–10.5)
WBC # FLD AUTO: 1.31 K/UL — LOW (ref 3.8–10.5)
WBC UR QL: 0 /HPF — SIGNIFICANT CHANGE UP (ref 0–5)

## 2024-10-24 PROCEDURE — 99254 IP/OBS CNSLTJ NEW/EST MOD 60: CPT | Mod: GC

## 2024-10-24 PROCEDURE — 99223 1ST HOSP IP/OBS HIGH 75: CPT | Mod: GC

## 2024-10-24 PROCEDURE — 99223 1ST HOSP IP/OBS HIGH 75: CPT

## 2024-10-24 RX ORDER — URSODIOL 500 MG/1
500 TABLET, FILM COATED ORAL
Refills: 0 | Status: DISCONTINUED | OUTPATIENT
Start: 2024-10-24 | End: 2024-10-29

## 2024-10-24 RX ORDER — PIPERACILLIN AND TAZOBACTAM .5; 4 G/20ML; G/20ML
3.38 INJECTION, POWDER, LYOPHILIZED, FOR SOLUTION INTRAVENOUS EVERY 12 HOURS
Refills: 0 | Status: DISCONTINUED | OUTPATIENT
Start: 2024-10-24 | End: 2024-10-28

## 2024-10-24 RX ORDER — ACETAMINOPHEN 500 MG
650 TABLET ORAL ONCE
Refills: 0 | Status: COMPLETED | OUTPATIENT
Start: 2024-10-24 | End: 2024-10-24

## 2024-10-24 RX ORDER — FEDRATINIB HYDROCHLORIDE 100 MG/1
2 CAPSULE ORAL
Refills: 0 | DISCHARGE

## 2024-10-24 RX ORDER — HYDROXYUREA 500 MG
500 CAPSULE ORAL DAILY
Refills: 0 | Status: DISCONTINUED | OUTPATIENT
Start: 2024-10-24 | End: 2024-10-29

## 2024-10-24 RX ORDER — PANTOPRAZOLE SODIUM 40 MG/1
40 TABLET, DELAYED RELEASE ORAL
Refills: 0 | Status: DISCONTINUED | OUTPATIENT
Start: 2024-10-24 | End: 2024-10-24

## 2024-10-24 RX ORDER — HYDROXYUREA 500 MG
1 CAPSULE ORAL
Refills: 0 | DISCHARGE

## 2024-10-24 RX ORDER — PANTOPRAZOLE SODIUM 40 MG/1
40 TABLET, DELAYED RELEASE ORAL
Refills: 0 | Status: DISCONTINUED | OUTPATIENT
Start: 2024-10-24 | End: 2024-10-29

## 2024-10-24 RX ORDER — OMEPRAZOLE 10 MG
20 CAPSULE,DELAYED RELEASE (ENTERIC COATED) ORAL
Refills: 0 | DISCHARGE

## 2024-10-24 RX ORDER — B-COMPLEX WITH VITAMIN C
1 VIAL (ML) INJECTION DAILY
Refills: 0 | Status: DISCONTINUED | OUTPATIENT
Start: 2024-10-24 | End: 2024-10-29

## 2024-10-24 RX ORDER — SODIUM ZIRCONIUM CYCLOSILICATE 10 G/10G
10 POWDER, FOR SUSPENSION ORAL ONCE
Refills: 0 | Status: DISCONTINUED | OUTPATIENT
Start: 2024-10-24 | End: 2024-10-24

## 2024-10-24 RX ORDER — URSODIOL 500 MG/1
1 TABLET, FILM COATED ORAL
Refills: 0 | DISCHARGE

## 2024-10-24 RX ORDER — METHOXY POLYETHYLENE GLYCOL-EPOETIN BETA 100 UG/.3ML
0.3 INJECTION, SOLUTION INTRAVENOUS
Refills: 0 | DISCHARGE

## 2024-10-24 RX ORDER — ERYTHROPOIETIN 10000 [IU]/ML
0 INJECTION, SOLUTION INTRAVENOUS; SUBCUTANEOUS
Refills: 0 | DISCHARGE

## 2024-10-24 RX ORDER — SODIUM ZIRCONIUM CYCLOSILICATE 10 G/10G
10 POWDER, FOR SUSPENSION ORAL ONCE
Refills: 0 | Status: COMPLETED | OUTPATIENT
Start: 2024-10-24 | End: 2024-10-24

## 2024-10-24 RX ORDER — SODIUM ZIRCONIUM CYCLOSILICATE 10 G/10G
10 POWDER, FOR SUSPENSION ORAL
Refills: 0 | Status: DISCONTINUED | OUTPATIENT
Start: 2024-10-24 | End: 2024-10-25

## 2024-10-24 RX ORDER — ONDANSETRON HYDROCHLORIDE 2 MG/ML
1 INJECTION, SOLUTION INTRAMUSCULAR; INTRAVENOUS
Refills: 0 | DISCHARGE

## 2024-10-24 RX ORDER — SODIUM CHLORIDE 9 MG/ML
100 INJECTION, SOLUTION INTRAMUSCULAR; INTRAVENOUS; SUBCUTANEOUS
Refills: 0 | Status: DISCONTINUED | OUTPATIENT
Start: 2024-10-24 | End: 2024-10-29

## 2024-10-24 RX ADMIN — Medication 1 TABLET(S): at 11:05

## 2024-10-24 RX ADMIN — SODIUM ZIRCONIUM CYCLOSILICATE 10 GRAM(S): 10 POWDER, FOR SUSPENSION ORAL at 17:05

## 2024-10-24 RX ADMIN — PIPERACILLIN AND TAZOBACTAM 25 GRAM(S): .5; 4 INJECTION, POWDER, LYOPHILIZED, FOR SOLUTION INTRAVENOUS at 17:04

## 2024-10-24 RX ADMIN — Medication 650 MILLIGRAM(S): at 23:07

## 2024-10-24 RX ADMIN — Medication 500 MILLIGRAM(S): at 17:04

## 2024-10-24 RX ADMIN — SODIUM ZIRCONIUM CYCLOSILICATE 10 GRAM(S): 10 POWDER, FOR SUSPENSION ORAL at 03:27

## 2024-10-24 RX ADMIN — PANTOPRAZOLE SODIUM 40 MILLIGRAM(S): 40 TABLET, DELAYED RELEASE ORAL at 17:04

## 2024-10-24 RX ADMIN — URSODIOL 500 MILLIGRAM(S): 500 TABLET, FILM COATED ORAL at 17:04

## 2024-10-24 NOTE — CONSULT NOTE ADULT - SUBJECTIVE AND OBJECTIVE BOX
HPI:  64F with ESRD on HD (AVF), Myelofibrosis (CC Dr. 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 Pt notes having some diarrhea, vomiting recently - also underwent fistulogram/vascular stent placement for her LUE fistula 2 days prior. Pt notes some black/green stools recently - denies any overt bloody stools or hematemesis, skin changes, palpitations, CP, vision changes, dizziness.    PMH: HTN, Jak2+ PV, CKD V  FH: no h/o hematologic issues  SH: Denies tobacco, eTOH. works in a nail salon,  with 3 children    Allergies: NKDA        Home Medications:  hydroxyurea 500 mg oral capsule: 1 cap(s) orally once a day (24 Oct 2024 08:44)  Inrebic 100 mg oral capsule: 2 cap(s) orally once a day (24 Oct 2024 09:27)  Mircera 200 mcg/0.3 mL injectable solution: 0.3 milliliter(s) injectable every 3 weeks (24 Oct 2024 09:35)  Omeprazole: 20 milligram(s) once a day (24 Oct 2024 08:45)  ondansetron 4 mg oral tablet: 1 tab(s) orally prn as needed for  nausea (24 Oct 2024 12:19)  Carmen-Ben Rx oral tablet: 1 tab(s) orally once a day (24 Oct 2024 08:41)  ursodiol 500 mg oral tablet: 1 tab(s) orally once a day (24 Oct 2024 09:35)        REVIEW OF SYSTEMS:  CONSTITUTIONAL: +fever, general weakness  EYES: No visual changes or vertigo  ENT: No throat pain, rhinorrhea, or hearing loss   NECK: No pain or stiffness  RESPIRATORY: No cough, wheezing, hemoptysis; No shortness of breath  CARDIOVASCULAR: No chest pain or palpitations  GASTROINTESTINAL: No abdominal or epigastric pain. +nausea/vomiting, diarrhea  GENITOURINARY: No dysuria, frequency or hematuria  NEUROLOGICAL: No numbness or weakness  SKIN: No itching, rashes, or bruises  Psych: Good mood, no substance use      OBJECTIVE:  ICU Vital Signs Last 24 Hrs  T(C): 36.5 (24 Oct 2024 15:00), Max: 38.3 (23 Oct 2024 19:30)  T(F): 97.7 (24 Oct 2024 15:00), Max: 100.9 (23 Oct 2024 19:30)  HR: 78 (24 Oct 2024 15:00) (78 - 96)  BP: 120/60 (24 Oct 2024 15:00) (86/50 - 168/54)  BP(mean): 62 (23 Oct 2024 23:45) (62 - 74)  ABP: --  ABP(mean): --  RR: 18 (24 Oct 2024 15:00) (14 - 20)  SpO2: 98% (24 Oct 2024 15:00) (97% - 100%)    O2 Parameters below as of 24 Oct 2024 15:00  Patient On (Oxygen Delivery Method): room air            GENERAL: NAD, lying in bed comfortably on RA  HEAD:  Atraumatic, Normocephalic  EYES: EOMI, conjunctiva and sclera clear  ENT: Moist mucous membranes  NECK: Supple  CHEST/LUNG: Unlabored respirations; no wheezing  HEART: Regular rate and rhythm  ABDOMEN: Soft, nontender, nondistended  EXTREMITIES:  No cyanosis or edema  NERVOUS SYSTEM:  A&Ox3, no focal deficits   SKIN: No rashes or lesions; AVF clean no erythema/warmth  Psych: Normal speech, normal behavior, normal affect      LABS:                        7.2    1.18  )-----------( 51       ( 24 Oct 2024 17:24 )             21.1     10-24    125[L]  |  91[L]  |  67[H]  ----------------------------<  138[H]  5.1   |  18[L]  |  7.15[H]    Ca    8.3[L]      24 Oct 2024 07:26  Phos  5.0     10-24  Mg     1.8     10-24    TPro  5.9[L]  /  Alb  3.6  /  TBili  5.4[H]  /  DBili  0.4[H]  /  AST  11  /  ALT  8[L]  /  AlkPhos  112  10-24    PT/INR - ( 24 Oct 2024 07:26 )   PT: 15.7 sec;   INR: 1.38 ratio         PTT - ( 24 Oct 2024 07:26 )  PTT:35.0 sec        
[facility] DIVISION OF KIDNEY DISEASES AND HYPERTENSION -- [phone]  -- INITIAL CONSULT NOTE  --------------------------------------------------------------------------------  HPI: 65 yo F with a PMH of ESRD on HD (JORGE, Shalonda, MARIETTA NIETO, Dr. De León), PCV 2/2 myelofibrosis, Anemia requiring frequent blood transfusions, HTN who presented to [facility] from HD unit due to hypotension and lethargy. Nephrology consulted for ESRD/HD management.    Pt seen and examined in the ED, daughter at bedside. Pt received a Flu shot after HD on 10/21/24. The following day she began feeling fatigued and has had nausea, vomiting and decreased PO intake. Pt denied fevers at home but was febrile in the ED. Of note, pt had recent LUE AV fistulogram. HD was not done on 10/23/24 due to hypotension. Pt still makes some urine. Pt denied leg swelling, diarrhea, shortness of breath.     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  Pancreatic cyst  History of myelofibrosis  ESRD on dialysis  Anemia secondary to renal failure  Polycythemia  Peritoneal dialysis catheter in place  Placed 8/9/2017  Hemoperitoneum as complication of peritoneal dialysis  s/p removal 9/18  AV fistula  Placed 9/18    FAMILY HISTORY:  Family history of hypertension in mother  Family history of malignant neoplasm (Grandparent)  head and neck in father    FH: cirrhosis (Sibling)    PAST SOCIAL HISTORY: No illicit drugs     ALLERGIES & MEDICATIONS  --------------------------------------------------------------------------------  Allergies  No Known Allergies    Intolerances    Standing Inpatient Medications    PRN Inpatient Medications    REVIEW OF SYSTEMS  --------------------------------------------------------------------------------  Gen: +Fevers  Head/Eyes/Ears: No HA  Respiratory: No dyspnea, cough  CV: No chest pain  GI: +N/V  : +Still produces urine  MSK: No edema  Skin: No rashes  Heme: No easy bruising or bleeding    All other systems were reviewed and are negative, except as noted.    VITALS/PHYSICAL EXAM  --------------------------------------------------------------------------------  T(C): 37.2 (10-23-24 @ 23:45), Max: 38.3 (10-23-24 @ 19:30)  HR: 84 (10-23-24 @ 23:45) (84 - 96)  BP: 86/50 (10-23-24 @ 23:45) (86/50 - 103/60)  RR: 15 (10-23-24 @ 23:45) (15 - 20)  SpO2: 98% (10-23-24 @ 23:45) (97% - 100%)  Wt(kg): --  Height (cm): 157.5 (10-23-24 @ 18:10)  Weight (kg): 53.5 (10-23-24 @ 18:10)  BMI (kg/m2): 21.6 (10-23-24 @ 18:10)  BSA (m2): 1.53 (10-23-24 @ 18:10)    Physical Exam:  	Gen: Ill appearing but in NAD  	HEENT: Anicteric  	Pulm: CTA B/L  	CV: S1S2+  	Abd: Soft, +BS    	Ext: No LE edema B/L  	Neuro: Awake  	Skin: Warm and dry. R chemoport (for pRBC infusions).   	Dialysis access: LUE AVF with palpable thrill and bruit heard.     LABS/STUDIES  --------------------------------------------------------------------------------              7.0    1.87  >-----------<  54       [10-23-24 @ 20:48]              20.7     131  |  95  |  62  ----------------------------<  102      [10-23-24 @ 20:48]  5.6   |  19  |  6.86        Ca     8.9     [10-23-24 @ 20:48]      Mg     1.8     [10-23-24 @ 20:48]    TPro  6.6  /  Alb  4.1  /  TBili  5.5  /  DBili  x   /  AST  12  /  ALT  8   /  AlkPhos  124  [10-23-24 @ 20:48]    Creatinine Trend:  SCr 6.86 [10-23 @ 20:48]

## 2024-10-24 NOTE — H&P ADULT - NSHPPHYSICALEXAM_GEN_ALL_CORE
T(C): 36.8 (10-24-24 @ 08:31), Max: 38.3 (10-23-24 @ 19:30)  HR: 83 (10-24-24 @ 08:31) (81 - 96)  BP: 97/58 (10-24-24 @ 08:31) (86/50 - 106/65)  RR: 18 (10-24-24 @ 08:31) (14 - 20)  SpO2: 100% (10-24-24 @ 08:31) (97% - 100%)    PHYSICAL EXAM:  GENERAL: NAD, well-groomed, well-developed  HEAD:  Atraumatic, Normocephalic  EYES: EOMI, PERRLA, conjunctiva and sclera clear, no jaundice   ENMT: No tonsillar erythema, exudates, or enlargement; Moist mucous membranes  NECK: Supple, non tender, No JVD, Normal thyroid  HEART: Regular rate and rhythm; No murmurs, rubs, or gallops. S1/S2 present  RESPIRATORY: CTA B/L, No W/R/R  ABDOMEN: Soft, Nontender, Nondistended; Bowel sounds present. Splenomegaly present  NEUROLOGY: A&Ox3, nonfocal, moving all extremities,  EXTREMITIES:  2+ Peripheral Pulses, No clubbing, cyanosis, or edema. 5/5 muscle tone in UE/LE. Large fistula present on LUE.   SKIN: warm, dry, normal color, no rash or abnormal lesions T(C): 36.8 (10-24-24 @ 08:31), Max: 38.3 (10-23-24 @ 19:30)  HR: 83 (10-24-24 @ 08:31) (81 - 96)  BP: 97/58 (10-24-24 @ 08:31) (86/50 - 106/65)  RR: 18 (10-24-24 @ 08:31) (14 - 20)  SpO2: 100% (10-24-24 @ 08:31) (97% - 100%)    PHYSICAL EXAM:  GENERAL: NAD, well-groomed, well-developed  HEAD:  Atraumatic, Normocephalic  EYES: EOMI, PERRLA, conjunctiva and sclera clear, no jaundice   ENMT: No tonsillar erythema, exudates, or enlargement; Moist mucous membranes  NECK: Supple, non tender, No JVD, Normal thyroid  HEART: Regular rate and rhythm; 2/6 holosystolic murmur present. No rubs, or gallops. S1/S2 present  RESPIRATORY: CTA B/L, No W/R/R  ABDOMEN: Soft, Nontender, Nondistended; Bowel sounds present. Splenomegaly present  NEUROLOGY: A&Ox3, nonfocal, moving all extremities,  EXTREMITIES:  2+ Peripheral Pulses, No clubbing, cyanosis, or edema. 5/5 muscle tone in UE/LE. Large fistula present on LUE.   SKIN: warm, dry, normal color, no rash or abnormal lesions T(C): 36.8 (10-24-24 @ 08:31), Max: 38.3 (10-23-24 @ 19:30)  HR: 83 (10-24-24 @ 08:31) (81 - 96)  BP: 97/58 (10-24-24 @ 08:31) (86/50 - 106/65)  RR: 18 (10-24-24 @ 08:31) (14 - 20)  SpO2: 100% (10-24-24 @ 08:31) (97% - 100%)    PHYSICAL EXAM:  GENERAL: NAD, well-groomed, well-developed  HEAD:  NCAT  EYES: EOMI, PERRL, conjunctiva and sclera clear, no jaundice   ENMT: No tonsillar erythema, exudates, or enlargement; Moist mucous membranes  NECK: Supple, non tender, No JVD, Normal thyroid  HEART: Regular rate and rhythm; 2/6 holosystolic murmur present. No rubs, or gallops. S1/S2 present  RESPIRATORY: CTA B/L, No W/R/R  ABDOMEN: Soft, Nontender, Nondistended; Bowel sounds present. Splenomegaly present  NEUROLOGY: A&Ox3, nonfocal, moving all extremities,  EXTREMITIES:  2+ Peripheral Pulses, No clubbing, cyanosis, or edema. 5/5 muscle tone in UE/LE. Large fistula present on LUE.   SKIN: warm, dry, normal color, no rash or abnormal lesions

## 2024-10-24 NOTE — H&P ADULT - PROBLEM SELECTOR PLAN 5
-c/w omeprazole 20mg qd Hx of non-cirrhotic portal HTN with gastric varices on CT. Possible source of GI bleeding, unlikely to be actively bleeding due to stable hgb.   -withholding coreg iso hypotension HD cancelled yesterday d/t hypotension and lethargy  - nephrology recs appreciated  - renal diet

## 2024-10-24 NOTE — H&P ADULT - PROBLEM SELECTOR PLAN 4
HD cancelled yesterday d/t hypotension and lethargy  -will go for HD today as long as BP is stable  -appreciate nephro recs K in ED 5.6, likely elevated d/t missed HD yesterday  - lokelma 10g BID  - monitor K

## 2024-10-24 NOTE — CONSULT NOTE ADULT - PROBLEM SELECTOR RECOMMENDATION 9
Pt. with ESRD on HD TIW (JORGE, Shalonda, LUE AVF, Dr. De León) sent in from HD unit due to hypotension. Last HD on 10/21/24 via LUE AVF. Labs reviewed, serum K+ slightly elevated at 5.6. Pt remains hypotensive. Pt received medical management and anticipating pRBC. No urgent HD needs at present but would plan for HD some time later today if BP improves. Discussed with the patient that she will require RRT/HD, risks and benefits associated with RRT/HD explained at length. HD consent obtained and kept in patients chart. Monitor BP and labs. Dose meds as per HD.    Check serum phos and PTH.  Blood cultures pending (pt with recent procedure to LUE AVF, hypotensive, febrile- monitor for bacteremia).  If remains hypotensive, consider ICU consult (may need midodrine or IV vasopressors for HD).

## 2024-10-24 NOTE — CONSULT NOTE ADULT - ASSESSMENT
Assessment: 64F with ESRD on HD, Myelofibrosis (CC Dr. 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    Peripheral Smear 10/24/24: RBC series normo-macrocytic, hypochromic with maybe 1-2 darrel cells and 1-2 tear drops per hpf; no target shapes, schistocytes; WBC series including lymphocytes and neutrophils normal in morphology, granularity; PLT series normal in morphology and size, decreased in count to around 2-5/hpf      Discussion:  Patient's peripheral smear not consistent with a hemolytic process. In patients with myelofibrosis (primary or secondary), extramedullary hematopoiesis over time commonly occurs in the liver and spleen - both of which in this patient have occurred and lead to increased portal pressures with subsequent varices. Unconjugated bilirubinemia may be seen in polycythemia vera due to rapid RBC turnover, but given her progression now to myelofibrosis with extramedullary hematopoiesis with splenomegaly, her bilirubin is likely from RBC destruction/turnover at these sites. An elevated bilirubin in 5's was also noted in outpatient labs in 8/2024.      #Polycythemia vera w/ secondary myelofibrosis  #ESRD on HD   #Splenomegaly  - Smear not c/w any emergent hemolytic process  - Inrebic 100 mg oral capsule: 2 cap(s) orally once a day >> continue  - hydroxyurea 500 mg qd continue  - pRBC transfuse <7, PLT <10 or <20 and febrile  - GI evaluation if any signs of GI bleed given hx of grade 1 varices  - US RUQ if fever or worsening transaminitis      Dr. Guo informed of admission. Please message me on MS teams with any additional questions.    Lux Whaley, PGY4  Hematology & Oncology Fellow  MS Teams - Call or Text Assessment: 64F with ESRD on HD, Myelofibrosis (CC Dr. 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    Peripheral Smear 10/24/24: RBC series normo-macrocytic, hypochromic with maybe 1-2 darrel cells and 1-2 tear drops per hpf; no target shapes, schistocytes; WBC series including lymphocytes and neutrophils normal in morphology, granularity; PLT series normal in morphology and size, decreased in count to around 2-5/hpf      Discussion:  Patient's peripheral smear not consistent with an intravascular hemolytic process. In patients with myelofibrosis (primary or secondary), extramedullary hematopoiesis over time commonly occurs in the liver and spleen - both of which in this patient have occurred and lead to increased portal pressures with subsequent varices. Unconjugated bilirubinemia may be seen in polycythemia vera due to rapid RBC turnover, but given her progression now to myelofibrosis with extramedullary hematopoiesis with splenomegaly, her bilirubin is likely from RBC destruction/turnover at these sites. An elevated bilirubin in 5's was also noted in outpatient labs in 8/2024.      #Polycythemia vera w/ secondary myelofibrosis  #ESRD on HD   #Splenomegaly  - Smear not c/w any emergent hemolytic process  - Inrebic 100 mg oral capsule: 2 cap(s) orally once a day >> continue  - hydroxyurea 500 mg qd continue  - pRBC transfuse <7, PLT <10 or <20 and febrile  - GI evaluation if any signs of GI bleed given hx of grade 1 varices  - US RUQ if fever or worsening transaminitis  - C/w infectious workup, zosyn, neutropenic precautions (avoid rectal temperature checks)      Dr. Guo informed of admission. Please message me on MS teams with any additional questions.    Lux Whaley, PGY4  Hematology & Oncology Fellow  MS Teams - Call or Text

## 2024-10-24 NOTE — H&P ADULT - ATTENDING COMMENTS
# Clinical sepsis w/ hypotension, recent vascular procedure  # Acute on chronic anemia 2/2 possible UGIB i/s/o gastric varices  # Pancytopenia  # Myelofibrosis  # noncirrhotic portal HTN    - C/w empiric zosyn for sepsis, unclear source. F/u bcx, ucx, gi pcr  - ppi bid, cbc q12 today  - hgb >= 7  - heme consult  - holding coreg due to hypotension    Rest of plan as above. D/w HS.    The necessity of the time spent during the encounter on this date of service was due to:   - Ordering, reviewing, and interpreting labs, testing, and imaging  - Independently obtaining a review of systems and performing a physical exam  - Reviewing prior hospitalization and where necessary, outpatient records  - Reviewing consultant recommendations/communicating with consultants  - Counselling and educating patient and family regarding interpretation of aforementioned items and plan of care    Time-based billing (NON-critical care). Total minutes spent: 90

## 2024-10-24 NOTE — H&P ADULT - HISTORY OF PRESENT ILLNESS
65 y/o F with PMH of ESRD on HD and myelofibrosis 2/2 polycythemia vera brought to ED from HD center due to hypotension and lethargy. Patient reports that she began feeling lethargic and had a temp of 99 on Tuesday evening following a fistula maintenance procedure on her LUE. Patient had some mild nausea and NBNB vomiting on Wednesday morning and had diarrhea that had a slight green appearance. Patient also experienced some chest and back pain during her vomiting episodes which has since resolved. Patient then went to HD where she was found to be hypotensive with a systolic BP in the 80-90s, so they sent her over to the ED where she was found to have a fever. Patient denies any chest pain, SOB, headaches, dizziness. Patient reports she has had some intermittent diarrhea over the past 2 weeks, but has not seen any blood or black-appearing stools. Patient received her flu shot on Monday.     ED Course: Found to have temp of 100.9, received 1x ofirmev, hgb 7.0 treated with 1U pRBCs. Zosyn and Vanc started for empiric infection tx. Given 1x zofran for nausea and Lokelma for hyperkalemia of 5.6. 65 y/o F with PMH of ESRD on HD and myelofibrosis 2/2 polycythemia vera brought to ED from HD center due to hypotension and lethargy. Patient reports that she began feeling lethargic and had a temp of 99 on Tuesday evening following a fistula maintenance procedure on her LUE. Patient had some mild nausea and NBNB vomiting on Wednesday morning and had diarrhea that had a slight green appearance. Patient also experienced some chest and back pain during her vomiting episodes which has since resolved. Patient then went to HD where she was found to be hypotensive with a systolic BP in the 80-90s, so they sent her over to the ED where she was found to have a fever. Patient denies any chest pain, SOB, headaches, dizziness. Patient reports she has had some intermittent diarrhea over the past 2 weeks, but has not seen any blood or black-appearing stools. Patient received her flu shot on Monday.     ED Course: Found to have temp of 100.9, received 1x ofirmev. Given saline bolus for hypotension. hgb 7.0 treated with 1U pRBCs. Zosyn and Vanc started for empiric infection tx. Given 1x zofran for nausea and Lokelma for hyperkalemia of 5.6. 65 y/o F with PMH of ESRD on HD and myelofibrosis 2/2 polycythemia vera brought to ED from HD center due to hypotension and lethargy. Patient reports that she began feeling lethargic and had a temp of 99 on Tuesday evening following a fistula maintenance procedure on her LUE. Patient had some mild nausea and NBNB vomiting on Wednesday morning and had diarrhea that had a slight green appearance. Patient also experienced some chest and back pain during her vomiting episodes which has since resolved. Patient then went to HD where she was found to be hypotensive with a systolic BP in the 80-90s, so they sent her over to the ED where she was found to have a fever. Patient denies any chest pain, SOB, headaches, dizziness. Patient reports she has had some intermittent diarrhea over the past 2 weeks, has not seen any blood in her stool, but she has had some black/green-tinged stools. Patient received her flu shot on Monday.     ED Course: Found to have temp of 100.9, received 1x ofirmev. Given saline bolus for hypotension. hgb 7.0 treated with 1U pRBCs. Zosyn and Vanc started for empiric infection tx. Given 1x zofran for nausea and Lokelma for hyperkalemia of 5.6. 65 y/o F with PMH of ESRD on HD and myelofibrosis 2/2 polycythemia vera brought to ED from HD center due to hypotension and lethargy. Patient reports that she began feeling lethargic and had a temp of 99 on Tuesday evening following a fistulogram/vascular stent placement for her LUE fistula. Patient endorses 2 wk history of diarrhea, associated with black stool. Last episode of black stool 2 days ago. Also had some mild nausea and NBNB vomiting on Wednesday morning and had diarrhea that had a slight green appearance. Pt was also told to discontinue coreg about 2 wks ago due to low BPs. Patient also experienced some chest and back pain during her vomiting episodes which has since resolved. Patient then went to HD where she was found to be hypotensive with a systolic BP in the 80-90s, so they sent her over to the ED where she was found to have a fever. Patient denies any chest pain, SOB, headaches, dizziness. Patient reports she has had some intermittent diarrhea over the past 2 weeks, has not seen any blood in her stool, but she has had some black/green-tinged stools. Patient received her flu shot on Monday.     ED Course: Found to have temp of 100.9, received 1x ofirmev. Given saline bolus for hypotension. hgb 7.0 treated with 1U pRBCs. Zosyn and Vanc started for empiric infection tx. Given 1x zofran for nausea and Lokelma for hyperkalemia of 5.6.

## 2024-10-24 NOTE — ED ADULT NURSE REASSESSMENT NOTE - NS ED NURSE REASSESS COMMENT FT1
1 unit of PRBCs initiated. Consent in chart. Risks and benefits of administering product explained to patient. Patient verbalized understanding of risks and benefits. Patient educated on side effects to look out for. VSS. Second RN at beside for confirmation of product and pt identification. Stretcher locked and in lowest position, appropriate side rails up. Pt instructed to notify RN if assistance is needed.

## 2024-10-24 NOTE — H&P ADULT - PROBLEM SELECTOR PLAN 2
hgb 7.0 in ED, concerning for possible GI bleed. Patient been having intermittent diarrhea for 2 weeks, occasional green-tinged but no blood or black/tarry appearance.   -hgb 7.0->7.1 followning 1U pRBCs  -consider another infusion if hgb <7  -appreciate heme recs Pancytopenia 2/2 myelofibrosis 2/2 polycythemia vera. hgb 7.0 in ED, concerning for possible GI bleed. Patient been having intermittent diarrhea for 2 weeks, occasional green-tinged but no blood or black/tarry appearance.   -hgb 7.0->7.1 followning 1U pRBCs  -consider another infusion if hgb <7  -iron studies, B12, folate  -LDH, reticulocyte, haptoglobin labs  -heme/onc consulted Pancytopenia 2/2 myelofibrosis 2/2 polycythemia vera. hgb 7.0 in ED, concerning for possible GI bleed. Patient been having intermittent diarrhea for 2 weeks, occasional green-tinged but no blood or black/tarry appearance.   -hgb 7.0->7.1 followning 1U pRBCs  -consider another infusion if hgb <7  -iron studies, B12, folate  -LDH, reticulocyte, haptoglobin labs  -heme/onc consulted  -q12 cbc's Acute on chronic anemia with hgb 7, below baseline of around 9 with reported melena, concerning for UGIB in setting of gastric varices. Last melanic stool reportedly 2 days ago, doubt continued bleed  -s/p 1u prbc in ED, with inappropriately low hgb trend  - monitor stool output  - CBC q12  - maintain active T&S  - PPI BID  - if pt develops sign of UGIB, start octreotide gtt, consult GI

## 2024-10-24 NOTE — PATIENT PROFILE ADULT - FALL HARM RISK - UNIVERSAL INTERVENTIONS
Bed in lowest position, wheels locked, appropriate side rails in place/Call bell, personal items and telephone in reach/Instruct patient to call for assistance before getting out of bed or chair/Non-slip footwear when patient is out of bed/[place] to call system/Physically safe environment - no spills, clutter or unnecessary equipment/Purposeful Proactive Rounding/Room/bathroom lighting operational, light cord in reach

## 2024-10-24 NOTE — H&P ADULT - PROBLEM SELECTOR PLAN 6
Diet: renal  DVT: SCDs  Dispo: pending medical optimization -c/w omeprazole 20mg qd Hx of non-cirrhotic portal HTN with gastric varices. Possible source of reported melena   - coreg held for hypotension  - anemia treatment as above

## 2024-10-24 NOTE — CONSULT NOTE ADULT - PROBLEM SELECTOR RECOMMENDATION 3
Patient with anemia in the setting of ESRD, PCV. Pt is on Ojjaara. Hgb 7. Per pt plan for pRBC. Monitor respiratory status closely, plan for HD later today. Will need to determine if pt is on INDER (per last admission, no INDER). Monitor hemoglobin.    Assessment and plan discussed with attending on call (Dr. De León). Patient with anemia in the setting of ESRD, PCV. Pt is on Ojjaara. Hgb 7. Per pt plan for pRBC. Monitor respiratory status closely, plan for HD later today. Will need to determine if pt is on INDER (per last admission, no INDER). Monitor hemoglobin.

## 2024-10-24 NOTE — H&P ADULT - PROBLEM SELECTOR PLAN 1
FUO, c/f bacteremia. Patient neutropenic 2/2 myelofibrosis. Received fistula procedure on Tuesday which puts her at increased risk of infection.   -100.9 in ED, went down to 98.2 iso ofirmev  -BCx/UXc ordered  -vanc/zosyn for empiric tx FUO, c/f bacteremia. Patient neutropenic 2/2 myelofibrosis. Received fistula procedure on Tuesday which puts her at increased risk of infection.   -100.9 in ED, went down to 98.2 iso ofirmev  -BCx/UXc ordered  -zosyn for empiric tx  -MRSA pcr  -GI pcr  -UA/RVP/CXR neg Meet SIRS criteria, with fever, leukocytosis and tackycardia.  FUO, c/f bacteremia. Patient neutropenic 2/2 myelofibrosis. Received fistula procedure on Tuesday which puts her at increased risk of infection.   -100.9 in ED, went down to 98.2 iso ofirmev  -BCx/UXc ordered  -zosyn for empiric tx  -MRSA pcr  -GI pcr  -UA/RVP/CXR neg Meet SIRS criteria, with fever, leukocytopenia and tachycardia.  FUO, c/f bacteremia. Patient neutropenic 2/2 myelofibrosis. Received fistula procedure on Tuesday which puts her at increased risk of infection.   -100.9 in ED, went down to 98.2 iso ofirmev  -BCx/UXc ordered  -zosyn for empiric tx  -MRSA pcr  -GI pcr  -UA/RVP/CXR neg Meet SIRS criteria, with fever, leukopenia and tachycardia, c/f bacteremia post vascular procedure w/ stent placement. S/p vanc/zosyn in ED x 1  - F/u BCx/UCx  - c/w zosyn empirically   - MRSA pcr  - GI pcr  - UA/RVP/CXR neg

## 2024-10-24 NOTE — H&P ADULT - PROBLEM SELECTOR PLAN 3
K in ED 5.6, likely elevated d/t missed HD yesterday  -lokelma 10g BID  -should improve with HD as well Hx of pancytopenia 2/2 myelofibrosis. On hydroxyurea and Ojjaara   - f/u iron studies, B12, folate, LDH, reticulocyte, haptoglobin labs   - heme/onc consulted  - holding home hydrea and ojjaara pending heme recs

## 2024-10-24 NOTE — CONSULT NOTE ADULT - ATTENDING COMMENTS
Patient with myelofibrosis and anemia; seen for evaluation of fatigue and history as recorded above. Hyperbilirubinemia noted and review of peripheral blood smear performed. Management of patient discussed with her using [facility] staff to assist with translation (English).
sent from HD 10/23 due to hypotension, weakness  #esrd on hd  mwf  missed hd yesterday  plan hd today then tomorrow per regular schedule   #hyperkalemia  hd today  #etiology of hypotension?  bp still low  cultures pending  no uf with hd today  #anemia  mds  known to dr yen whitman  monitor hb trend  getting micera as outpt; last 200mg 10/21  last hb 9.2 on 10.16

## 2024-10-24 NOTE — H&P ADULT - NSHPSOCIALHISTORY_GEN_ALL_CORE
- Lives with  and daughter  - Used to smoke. Now only once a week or once a month  - Never drinkss - Lives with  and daughter  - Used to smoke. Now only once a week or once a month  - Never drinks

## 2024-10-24 NOTE — CONSULT NOTE ADULT - PROBLEM SELECTOR RECOMMENDATION 2
Pt. with mild hyperkalemia in setting of ESRD on HD. Recommend medical management with insulin/dextrose/calcium gluconate if EKG changes. Recommend Lokelma 10g BID for now. Plan for HD later today as above if BP improves. Low potassium diet advised. Monitor serum potassium

## 2024-10-24 NOTE — ED ADULT NURSE REASSESSMENT NOTE - NS ED NURSE REASSESS COMMENT FT1
As per blood bank, blood is being prepared but will not be ready for another 45 minutes approximately due to pt's antibodies.

## 2024-10-24 NOTE — H&P ADULT - ASSESSMENT
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 Received 1U pRBCs in ED, BCx/UCx ordered for sepsis w/u. Empiric tx with vanc/zosyn. Scheduled for HD today d/t hyperkalemia.  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 Received 1U pRBCs in ED. RVP, CXR, UA neg. BCx/UCx ordered for sepsis w/u. Empiric tx with zosyn. Will resume HD when BP is stable. 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

## 2024-10-24 NOTE — H&P ADULT - NSICDXPASTMEDICALHX_GEN_ALL_CORE_FT
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

## 2024-10-24 NOTE — H&P ADULT - NSHPLABSRESULTS_GEN_ALL_CORE
7.1    1.31  )-----------( 41       ( 24 Oct 2024 06:24 )             21.3       10-24    125[L]  |  91[L]  |  67[H]  ----------------------------<  138[H]  5.1   |  18[L]  |  7.15[H]    Ca    8.3[L]      24 Oct 2024 07:26  Phos  5.0     10-24  Mg     1.8     10-24    TPro  5.9[L]  /  Alb  3.6  /  TBili  5.4[H]  /  DBili  x   /  AST  11  /  ALT  8[L]  /  AlkPhos  112  10-24              Urinalysis Basic - ( 24 Oct 2024 07:26 )    Color: x / Appearance: x / SG: x / pH: x  Gluc: 138 mg/dL / Ketone: x  / Bili: x / Urobili: x   Blood: x / Protein: x / Nitrite: x   Leuk Esterase: x / RBC: x / WBC x   Sq Epi: x / Non Sq Epi: x / Bacteria: x        PT/INR - ( 24 Oct 2024 07:26 )   PT: 15.7 sec;   INR: 1.38 ratio         PTT - ( 24 Oct 2024 07:26 )  PTT:35.0 sec    Lactate Trend            CAPILLARY BLOOD GLUCOSE

## 2024-10-25 LAB
ALBUMIN SERPL ELPH-MCNC: 3.8 G/DL — SIGNIFICANT CHANGE UP (ref 3.3–5)
ALP SERPL-CCNC: 121 U/L — HIGH (ref 40–120)
ALT FLD-CCNC: 6 U/L — LOW (ref 10–45)
ANION GAP SERPL CALC-SCNC: 16 MMOL/L — SIGNIFICANT CHANGE UP (ref 5–17)
APTT BLD: 30.1 SEC — SIGNIFICANT CHANGE UP (ref 24.5–35.6)
AST SERPL-CCNC: 11 U/L — SIGNIFICANT CHANGE UP (ref 10–40)
BASOPHILS # BLD AUTO: 0 K/UL — SIGNIFICANT CHANGE UP (ref 0–0.2)
BASOPHILS NFR BLD AUTO: 0 % — SIGNIFICANT CHANGE UP (ref 0–2)
BILIRUB SERPL-MCNC: 5.7 MG/DL — HIGH (ref 0.2–1.2)
BUN SERPL-MCNC: 34 MG/DL — HIGH (ref 7–23)
CALCIUM SERPL-MCNC: 8.3 MG/DL — LOW (ref 8.4–10.5)
CHLORIDE SERPL-SCNC: 98 MMOL/L — SIGNIFICANT CHANGE UP (ref 96–108)
CO2 SERPL-SCNC: 24 MMOL/L — SIGNIFICANT CHANGE UP (ref 22–31)
CREAT SERPL-MCNC: 4.95 MG/DL — HIGH (ref 0.5–1.3)
CULTURE RESULTS: NO GROWTH — SIGNIFICANT CHANGE UP
EGFR: 9 ML/MIN/1.73M2 — LOW
EOSINOPHIL # BLD AUTO: 0 K/UL — SIGNIFICANT CHANGE UP (ref 0–0.5)
EOSINOPHIL NFR BLD AUTO: 0 % — SIGNIFICANT CHANGE UP (ref 0–6)
FOLATE SERPL-MCNC: >20 NG/ML — SIGNIFICANT CHANGE UP
FOLATE SERPL-MCNC: >20 NG/ML — SIGNIFICANT CHANGE UP
GI PCR PANEL: DETECTED
GLUCOSE SERPL-MCNC: 123 MG/DL — HIGH (ref 70–99)
HAPTOGLOB SERPL-MCNC: <20 MG/DL — LOW (ref 34–200)
HCT VFR BLD CALC: 20.6 % — CRITICAL LOW (ref 34.5–45)
HGB BLD-MCNC: 6.8 G/DL — CRITICAL LOW (ref 11.5–15.5)
INR BLD: 1.36 RATIO — HIGH (ref 0.85–1.16)
LYMPHOCYTES # BLD AUTO: 0.23 K/UL — LOW (ref 1–3.3)
LYMPHOCYTES # BLD AUTO: 22 % — SIGNIFICANT CHANGE UP (ref 13–44)
MAGNESIUM SERPL-MCNC: 2 MG/DL — SIGNIFICANT CHANGE UP (ref 1.6–2.6)
MANUAL SMEAR VERIFICATION: SIGNIFICANT CHANGE UP
MCHC RBC-ENTMCNC: 33 GM/DL — SIGNIFICANT CHANGE UP (ref 32–36)
MCHC RBC-ENTMCNC: 38 PG — HIGH (ref 27–34)
MCV RBC AUTO: 115.1 FL — HIGH (ref 80–100)
METAMYELOCYTES # FLD: 1.7 % — HIGH (ref 0–0)
MONOCYTES # BLD AUTO: 0.04 K/UL — SIGNIFICANT CHANGE UP (ref 0–0.9)
MONOCYTES NFR BLD AUTO: 3.4 % — SIGNIFICANT CHANGE UP (ref 2–14)
NEUTROPHILS # BLD AUTO: 0.77 K/UL — LOW (ref 1.8–7.4)
NEUTROPHILS NFR BLD AUTO: 61 % — SIGNIFICANT CHANGE UP (ref 43–77)
NEUTS BAND # BLD: 11.9 % — HIGH (ref 0–8)
NOROVIRUS GI+II RNA STL QL NAA+NON-PROBE: DETECTED
PHOSPHATE SERPL-MCNC: 4.2 MG/DL — SIGNIFICANT CHANGE UP (ref 2.5–4.5)
PLAT MORPH BLD: NORMAL — SIGNIFICANT CHANGE UP
PLATELET # BLD AUTO: 46 K/UL — LOW (ref 150–400)
POTASSIUM SERPL-MCNC: 3.6 MMOL/L — SIGNIFICANT CHANGE UP (ref 3.5–5.3)
POTASSIUM SERPL-SCNC: 3.6 MMOL/L — SIGNIFICANT CHANGE UP (ref 3.5–5.3)
PROT SERPL-MCNC: 6.1 G/DL — SIGNIFICANT CHANGE UP (ref 6–8.3)
PROTHROM AB SERPL-ACNC: 15.6 SEC — HIGH (ref 9.9–13.4)
RBC # BLD: 1.79 M/UL — LOW (ref 3.8–5.2)
RBC # FLD: SIGNIFICANT CHANGE UP (ref 10.3–14.5)
RBC BLD AUTO: SIGNIFICANT CHANGE UP
SODIUM SERPL-SCNC: 138 MMOL/L — SIGNIFICANT CHANGE UP (ref 135–145)
SPECIMEN SOURCE: SIGNIFICANT CHANGE UP
VIT B12 SERPL-MCNC: 525 PG/ML — SIGNIFICANT CHANGE UP (ref 232–1245)
VIT B12 SERPL-MCNC: 546 PG/ML — SIGNIFICANT CHANGE UP (ref 232–1245)
WBC # BLD: 1.05 K/UL — LOW (ref 3.8–10.5)
WBC # FLD AUTO: 1.05 K/UL — LOW (ref 3.8–10.5)

## 2024-10-25 PROCEDURE — 99233 SBSQ HOSP IP/OBS HIGH 50: CPT

## 2024-10-25 PROCEDURE — 99232 SBSQ HOSP IP/OBS MODERATE 35: CPT

## 2024-10-25 PROCEDURE — 76705 ECHO EXAM OF ABDOMEN: CPT | Mod: 26

## 2024-10-25 RX ORDER — ACETAMINOPHEN 500 MG
650 TABLET ORAL ONCE
Refills: 0 | Status: COMPLETED | OUTPATIENT
Start: 2024-10-25 | End: 2024-10-25

## 2024-10-25 RX ORDER — CHLORHEXIDINE GLUCONATE 40 MG/ML
1 SOLUTION TOPICAL DAILY
Refills: 0 | Status: DISCONTINUED | OUTPATIENT
Start: 2024-10-25 | End: 2024-10-29

## 2024-10-25 RX ADMIN — Medication 650 MILLIGRAM(S): at 21:31

## 2024-10-25 RX ADMIN — PANTOPRAZOLE SODIUM 40 MILLIGRAM(S): 40 TABLET, DELAYED RELEASE ORAL at 17:29

## 2024-10-25 RX ADMIN — Medication 650 MILLIGRAM(S): at 00:00

## 2024-10-25 RX ADMIN — PIPERACILLIN AND TAZOBACTAM 25 GRAM(S): .5; 4 INJECTION, POWDER, LYOPHILIZED, FOR SOLUTION INTRAVENOUS at 06:20

## 2024-10-25 RX ADMIN — Medication 500 MILLIGRAM(S): at 11:50

## 2024-10-25 RX ADMIN — PANTOPRAZOLE SODIUM 40 MILLIGRAM(S): 40 TABLET, DELAYED RELEASE ORAL at 06:22

## 2024-10-25 RX ADMIN — PIPERACILLIN AND TAZOBACTAM 25 GRAM(S): .5; 4 INJECTION, POWDER, LYOPHILIZED, FOR SOLUTION INTRAVENOUS at 17:28

## 2024-10-25 RX ADMIN — Medication 650 MILLIGRAM(S): at 22:30

## 2024-10-25 RX ADMIN — SODIUM ZIRCONIUM CYCLOSILICATE 10 GRAM(S): 10 POWDER, FOR SUSPENSION ORAL at 08:58

## 2024-10-25 RX ADMIN — Medication 1 TABLET(S): at 11:50

## 2024-10-25 RX ADMIN — URSODIOL 500 MILLIGRAM(S): 500 TABLET, FILM COATED ORAL at 17:29

## 2024-10-25 RX ADMIN — URSODIOL 500 MILLIGRAM(S): 500 TABLET, FILM COATED ORAL at 06:21

## 2024-10-25 NOTE — PROGRESS NOTE ADULT - SUBJECTIVE AND OBJECTIVE BOX
====================INCOMPLETE NOTE====================    INTERVAL HPI/OVERNIGHT EVENTS:    SUBJECTIVE: Patient seen and examined at bedside.     VITAL SIGNS:  T(C): 36.9 (10-25-24 @ 04:50), Max: 38.2 (10-24-24 @ 21:09)  HR: 84 (10-25-24 @ 04:50) (78 - 102)  BP: 99/56 (10-25-24 @ 04:50) (95/56 - 168/54)  RR: 18 (10-25-24 @ 04:50) (16 - 20)  SpO2: 99% (10-25-24 @ 04:50) (98% - 100%)      PHYSICAL EXAM:        MEDICATIONS:  MEDICATIONS  (STANDING):  hydroxyurea 500 milliGRAM(s) Oral daily  Inrebic 100 mg 2 Capsule(s)   Oral <User Schedule>  Nephro-deonna 1 Tablet(s) Oral daily  pantoprazole    Tablet 40 milliGRAM(s) Oral two times a day  piperacillin/tazobactam IVPB.. 3.375 Gram(s) IV Intermittent every 12 hours  sodium zirconium cyclosilicate 10 Gram(s) Oral two times a day  ursodiol Tablet 500 milliGRAM(s) Oral two times a day    MEDICATIONS  (PRN):  sodium chloride 0.9% Bolus. 100 milliLiter(s) IV Bolus every 5 minutes PRN SBP LESS THAN or EQUAL to 80 mmHg      LABS:                        7.2    1.18  )-----------( 51       ( 24 Oct 2024 17:24 )             21.1     10-24    125[L]  |  91[L]  |  67[H]  ----------------------------<  138[H]  5.1   |  18[L]  |  7.15[H]    Ca    8.3[L]      24 Oct 2024 07:26  Phos  5.0     10-24  Mg     1.8     10-24    TPro  5.9[L]  /  Alb  3.6  /  TBili  5.4[H]  /  DBili  0.4[H]  /  AST  11  /  ALT  8[L]  /  AlkPhos  112  10-24    PT/INR - ( 24 Oct 2024 07:26 )   PT: 15.7 sec;   INR: 1.38 ratio         PTT - ( 24 Oct 2024 07:26 )  PTT:35.0 sec  Urinalysis Basic - ( 24 Oct 2024 07:26 )    Color: x / Appearance: x / SG: x / pH: x  Gluc: 138 mg/dL / Ketone: x  / Bili: x / Urobili: x   Blood: x / Protein: x / Nitrite: x   Leuk Esterase: x / RBC: x / WBC x   Sq Epi: x / Non Sq Epi: x / Bacteria: x         INTERVAL HPI/OVERNIGHT EVENTS: NAEON.    SUBJECTIVE: Patient seen and examined at bedside. Patient is feeling better than yesterday. Reports one bowel movement of well-formed stool, patient felt the color was normal and did not see any blood/black contamination. Denies chest pain, nausea, vomiting, SOB, dizziness, or weakness.     VITAL SIGNS:  T(C): 36.9 (10-25-24 @ 04:50), Max: 38.2 (10-24-24 @ 21:09)  HR: 84 (10-25-24 @ 04:50) (78 - 102)  BP: 99/56 (10-25-24 @ 04:50) (95/56 - 168/54)  RR: 18 (10-25-24 @ 04:50) (16 - 20)  SpO2: 99% (10-25-24 @ 04:50) (98% - 100%)      PHYSICAL EXAM:    T(C): 36.9 (10-25-24 @ 04:50), Max: 38.2 (10-24-24 @ 21:09)  HR: 84 (10-25-24 @ 04:50) (78 - 102)  BP: 99/56 (10-25-24 @ 04:50) (95/56 - 120/60)  RR: 18 (10-25-24 @ 04:50) (18 - 20)  SpO2: 99% (10-25-24 @ 04:50) (98% - 99%)    GENERAL: NAD, well-groomed, well-developed  HEAD:  NCAT  EYES: EOMI, PERRL, mild scleral icterus, no jaundice   ENMT: No tonsillar erythema, exudates, or enlargement; Moist mucous membranes  NECK: Supple, non tender, No JVD, Normal thyroid  HEART: Regular rate and rhythm; 2/6 holosystolic murmur present. No rubs, or gallops. S1/S2 present  RESPIRATORY: CTA B/L, No W/R/R  ABDOMEN: Soft, Nontender, Nondistended; Bowel sounds present. Splenomegaly present  NEUROLOGY: A&Ox3, nonfocal, moving all extremities,  EXTREMITIES:  2+ Peripheral Pulses, No clubbing, cyanosis, or edema. 5/5 muscle tone in UE/LE. Large fistula present on LUE.   SKIN: warm, dry, normal color, no rash or abnormal lesions        MEDICATIONS:  MEDICATIONS  (STANDING):  hydroxyurea 500 milliGRAM(s) Oral daily  Inrebic 100 mg 2 Capsule(s)   Oral <User Schedule>  Nephro-deonna 1 Tablet(s) Oral daily  pantoprazole    Tablet 40 milliGRAM(s) Oral two times a day  piperacillin/tazobactam IVPB.. 3.375 Gram(s) IV Intermittent every 12 hours  sodium zirconium cyclosilicate 10 Gram(s) Oral two times a day  ursodiol Tablet 500 milliGRAM(s) Oral two times a day    MEDICATIONS  (PRN):  sodium chloride 0.9% Bolus. 100 milliLiter(s) IV Bolus every 5 minutes PRN SBP LESS THAN or EQUAL to 80 mmHg      LABS:                        7.2    1.18  )-----------( 51       ( 24 Oct 2024 17:24 )             21.1     10-24    125[L]  |  91[L]  |  67[H]  ----------------------------<  138[H]  5.1   |  18[L]  |  7.15[H]    Ca    8.3[L]      24 Oct 2024 07:26  Phos  5.0     10-24  Mg     1.8     10-24    TPro  5.9[L]  /  Alb  3.6  /  TBili  5.4[H]  /  DBili  0.4[H]  /  AST  11  /  ALT  8[L]  /  AlkPhos  112  10-24    PT/INR - ( 24 Oct 2024 07:26 )   PT: 15.7 sec;   INR: 1.38 ratio         PTT - ( 24 Oct 2024 07:26 )  PTT:35.0 sec  Urinalysis Basic - ( 24 Oct 2024 07:26 )    Color: x / Appearance: x / SG: x / pH: x  Gluc: 138 mg/dL / Ketone: x  / Bili: x / Urobili: x   Blood: x / Protein: x / Nitrite: x   Leuk Esterase: x / RBC: x / WBC x   Sq Epi: x / Non Sq Epi: x / Bacteria: x

## 2024-10-25 NOTE — PROGRESS NOTE ADULT - SUBJECTIVE AND OBJECTIVE BOX
[facility] DIVISION OF KIDNEY DISEASES AND HYPERTENSION -- FOLLOW UP NOTE  --------------------------------------------------------------------------------  Chief Complaint:/subjective:  no complaints  no sob    daughter at bedside  24 hour events:  hb lower today  hd yesterday       PAST HISTORY  --------------------------------------------------------------------------------  No significant changes to PMH, PSH, FHx, SHx, unless otherwise noted    ALLERGIES & MEDICATIONS  --------------------------------------------------------------------------------  Allergies    No Known Allergies    Intolerances      Standing Inpatient Medications  hydroxyurea 500 milliGRAM(s) Oral daily  Inrebic 100 mg 2 Capsule(s) 2 Capsule(s) Oral <User Schedule>  Nephro-deonna 1 Tablet(s) Oral daily  pantoprazole    Tablet 40 milliGRAM(s) Oral two times a day  piperacillin/tazobactam IVPB.. 3.375 Gram(s) IV Intermittent every 12 hours  sodium zirconium cyclosilicate 10 Gram(s) Oral two times a day  ursodiol Tablet 500 milliGRAM(s) Oral two times a day    PRN Inpatient Medications  sodium chloride 0.9% Bolus. 100 milliLiter(s) IV Bolus every 5 minutes PRN      REVIEW OF SYSTEMS  --------------------------------------------------------------------------------  Gen: No weight changes, fatigue, fevers/chills, weakness  Skin: No rashes  Head/Eyes/Ears/Mouth: No headache;   Respiratory: No dyspnea, cough  CV: No chest pain, PND, orthopnea  GI: No abdominal pain, diarrhea, constipation, nausea, vomiting  : No increased frequency, dysuria, hematuria, nocturia  MSK: No joint pain/swelling; no back pain; no edema  Neuro: No dizziness/lightheadedness, weakness  Heme: No easy bruising or bleeding  Psych: No significant nervousness, anxiety, stress, depression    All other systems were reviewed and are negative, except as noted.    VITALS/PHYSICAL EXAM  --------------------------------------------------------------------------------  T(C): 36.9 (10-25-24 @ 04:50), Max: 38.2 (10-24-24 @ 21:09)  HR: 84 (10-25-24 @ 04:50) (78 - 102)  BP: 99/56 (10-25-24 @ 04:50) (95/56 - 168/54)  RR: 18 (10-25-24 @ 04:50) (18 - 20)  SpO2: 99% (10-25-24 @ 04:50) (98% - 99%)  Wt(kg): --  Adult Advanced Hemodynamics Last 24 Hrs  ABP: --  ABP(mean): --  CVP(mm Hg): --  CO: --  CI: --  PA: --  PA(mean): --  PCWP: --  SVR: --  SVRI: --  Height (cm): 157.5 (10-24-24 @ 08:54)  Weight (kg): 53 (10-24-24 @ 08:54)  BMI (kg/m2): 21.4 (10-24-24 @ 08:54)  BSA (m2): 1.52 (10-24-24 @ 08:54)      Physical Exam:  	Gen: NAD,   	HEENT:  no jvp  	Pulm: CTA B/L  	CV: RRR, S1S2; no rub  	Back:   no sacral edema  	Abd: +BS, soft,   	: No suprapubic tenderness  	Ext: no edema  	Neuro: awake  	Psych: alert  	Skin: Warm,   	Vascular access: left avf +b+T    LABS/STUDIES  --------------------------------------------------------------------------------              6.8    1.05  >-----------<  46       [10-25-24 @ 07:09]              20.6     Hemoglobin: 6.8 g/dL (10-25-24 @ 07:09)  Hemoglobin: 7.2 g/dL (10-24-24 @ 17:24)    Platelet Count - Automated: 46 K/uL (10-25-24 @ 07:09)  Platelet Count - Automated: 51 K/uL (10-24-24 @ 17:24)    138  |  98  |  34  ----------------------------<  123      [10-25-24 @ 07:09]  3.6   |  24  |  4.95        Ca     8.3     [10-25-24 @ 07:09]      Mg     2.0     [10-25-24 @ 07:09]      Phos  4.2     [10-25-24 @ 07:09]    TPro  6.1  /  Alb  3.8  /  TBili  5.7  /  DBili  x   /  AST  11  /  ALT  6   /  AlkPhos  121  [10-25-24 @ 07:09]    PT/INR: PT 15.6 , INR 1.36       [10-25-24 @ 07:09]  PTT: 30.1       [10-25-24 @ 07:09]          [10-24-24 @ 07:26]    Creatinine Trend:  SCr 4.95 [10-25 @ 07:09]  SCr 7.15 [10-24 @ 07:26]  SCr 6.86 [10-23 @ 20:48]    Urinalysis - [10-25-24 @ 07:09]      Color  / Appearance  / SG  / pH       Gluc 123 / Ketone   / Bili  / Urobili        Blood  / Protein  / Leuk Est  / Nitrite       RBC  / WBC  / Hyaline  / Gran  / Sq Epi  / Non Sq Epi  / Bacteria       Iron 48, TIBC 116, %sat 42      [10-24-24 @ 07:26]  Ferritin 633      [10-24-24 @ 07:28]  PTH -- (Ca 8.1)      [10-24-24 @ 07:28]   225  PTH -- (Ca 8.0)      [12-20-23 @ 07:21]   135  PTH -- (Ca 8.9)      [11-28-23 @ 06:56]   89  TSH 1.02      [10-24-24 @ 07:28]    HBsAb Reactive      [10-24-24 @ 07:28]  HBsAg Nonreact      [10-23-24 @ 20:48]  HCV 0.06, Nonreact      [10-24-24 @ 07:28]

## 2024-10-25 NOTE — ASU PATIENT PROFILE, ADULT - WILL THE PATIENT ACCEPT THE PFIZER COVID-19 VACCINE IF ELIGIBLE AND IT IS AVAILABLE?
1) This department to continue to follow up as schedule permits for swallow therapy and for diet tolerance. 2) Medical team further advised to reconsult if there is a change in medical status and/or observed change in patient's tolerance of recommended PO diet. 3) Consider continued swallow therapy pending discharge plans (i.e., homecare vs rehab vs [facility] [phone]). Not applicable

## 2024-10-25 NOTE — PROGRESS NOTE ADULT - ATTENDING COMMENTS
# Clinical sepsis w/ hypotension, recent vascular procedure  # Acute on chronic anemia 2/2 possible UGIB i/s/o gastric varices  # Pancytopenia  # Myelofibrosis  # noncirrhotic portal HTN  # neutropenic fever    - C/w empiric zosyn for sepsis, unclear source. F/u bcx, ucx, gi pcr  - ppi bid, cbc q12 today  - 1u prbc 10/24, 1u prbc 10/25  - no evidence of acute bleed. labs consistent with hemolysis but no schistocytes  - hgb >= 7  - heme consult  - holding coreg due to hypotension    Rest of plan as above. D/w HS.    The necessity of the time spent during the encounter on this date of service was due to:   - Ordering, reviewing, and interpreting labs, testing, and imaging  - Independently obtaining a review of systems and performing a physical exam  - Reviewing prior hospitalization and where necessary, outpatient records  - Reviewing consultant recommendations/communicating with consultants  - Counselling and educating patient and family regarding interpretation of aforementioned items and plan of care    Time-based billing (NON-critical care). Total minutes spent: 90 # Clinical sepsis w/ hypotension, recent vascular procedure  # Acute on chronic anemia 2/2 possible UGIB i/s/o gastric varices  # Pancytopenia  # Myelofibrosis  # noncirrhotic portal HTN  # neutropenic fever    - C/w empiric zosyn for sepsis, unclear source. F/u bcx, ucx, gi pcr  - ppi bid, cbc q12 today  - 1u prbc 10/24, 1u prbc 10/25  - no evidence of acute bleed. labs consistent with hemolysis but no schistocytes  - hgb >= 7  - heme consult  - holding coreg due to hypotension    Rest of plan as above. D/w HS.    The necessity of the time spent during the encounter on this date of service was due to:   - Ordering, reviewing, and interpreting labs, testing, and imaging  - Independently obtaining a review of systems and performing a physical exam  - Reviewing prior hospitalization and where necessary, outpatient records  - Reviewing consultant recommendations/communicating with consultants  - Counselling and educating patient and family regarding interpretation of aforementioned items and plan of care    Time-based billing (NON-critical care). Total minutes spent: 55

## 2024-10-25 NOTE — PROGRESS NOTE ADULT - SUBJECTIVE AND OBJECTIVE BOX
INTERVAL HPI/OVERNIGHT EVENTS:  Patient S&E at bedside. No o/n events, Hgb dropped to 6.8 getting 1U PRBC during HD.    VITAL SIGNS:  T(F): 98.5 (10-25-24 @ 04:50)  HR: 84 (10-25-24 @ 04:50)  BP: 99/56 (10-25-24 @ 04:50)  RR: 18 (10-25-24 @ 04:50)  SpO2: 99% (10-25-24 @ 04:50)  Wt(kg): --    PHYSICAL EXAM:    Constitutional: NAD, chronically ill appearing  Eyes: EOMI, sclera non-icteric  Neck: supple, no masses, no JVD  Respiratory: CTA b/l, good air entry b/l  Cardiovascular: RRR, no M/R/G  Gastrointestinal: soft, NTND, no masses palpable, + BS  Extremities: no c/c/e, AVF clean no erythema/warmth  Neurological: AAOx3      MEDICATIONS  (STANDING):  hydroxyurea 500 milliGRAM(s) Oral daily  Inrebic 100 mg 2 Capsule(s) 2 Capsule(s) Oral <User Schedule>  Nephro-deonna 1 Tablet(s) Oral daily  pantoprazole    Tablet 40 milliGRAM(s) Oral two times a day  piperacillin/tazobactam IVPB.. 3.375 Gram(s) IV Intermittent every 12 hours  sodium zirconium cyclosilicate 10 Gram(s) Oral two times a day  ursodiol Tablet 500 milliGRAM(s) Oral two times a day    MEDICATIONS  (PRN):  sodium chloride 0.9% Bolus. 100 milliLiter(s) IV Bolus every 5 minutes PRN SBP LESS THAN or EQUAL to 80 mmHg      Allergies    No Known Allergies    Intolerances        LABS:                        6.8    1.05  )-----------( 46       ( 25 Oct 2024 07:09 )             20.6     10-25    138  |  98  |  34[H]  ----------------------------<  123[H]  3.6   |  24  |  4.95[H]    Ca    8.3[L]      25 Oct 2024 07:09  Phos  4.2     10-25  Mg     2.0     10-25    TPro  6.1  /  Alb  3.8  /  TBili  5.7[H]  /  DBili  x   /  AST  11  /  ALT  6[L]  /  AlkPhos  121[H]  10-25    PT/INR - ( 25 Oct 2024 07:09 )   PT: 15.6 sec;   INR: 1.36 ratio         PTT - ( 25 Oct 2024 07:09 )  PTT:30.1 sec  Urinalysis Basic - ( 25 Oct 2024 07:09 )    Color: x / Appearance: x / SG: x / pH: x  Gluc: 123 mg/dL / Ketone: x  / Bili: x / Urobili: x   Blood: x / Protein: x / Nitrite: x   Leuk Esterase: x / RBC: x / WBC x   Sq Epi: x / Non Sq Epi: x / Bacteria: x        RADIOLOGY & ADDITIONAL TESTS:  Studies reviewed.    ASSESSMENT & PLAN:  INTERVAL HPI/OVERNIGHT EVENTS:  Patient S&E at bedside. No o/n events, Hgb dropped to 6.8 getting 1U PRBC today.    VITAL SIGNS:  T(F): 98.5 (10-25-24 @ 04:50)  HR: 84 (10-25-24 @ 04:50)  BP: 99/56 (10-25-24 @ 04:50)  RR: 18 (10-25-24 @ 04:50)  SpO2: 99% (10-25-24 @ 04:50)  Wt(kg): --    PHYSICAL EXAM:    Constitutional: NAD, chronically ill appearing  Eyes: EOMI, sclera non-icteric  Neck: supple, no masses, no JVD  Respiratory: CTA b/l, good air entry b/l  Cardiovascular: RRR, no M/R/G  Gastrointestinal: soft, NTND, no masses palpable, + BS  Extremities: no c/c/e, AVF clean no erythema/warmth  Neurological: AAOx3      MEDICATIONS  (STANDING):  hydroxyurea 500 milliGRAM(s) Oral daily  Inrebic 100 mg 2 Capsule(s) 2 Capsule(s) Oral <User Schedule>  Nephro-deonna 1 Tablet(s) Oral daily  pantoprazole    Tablet 40 milliGRAM(s) Oral two times a day  piperacillin/tazobactam IVPB.. 3.375 Gram(s) IV Intermittent every 12 hours  sodium zirconium cyclosilicate 10 Gram(s) Oral two times a day  ursodiol Tablet 500 milliGRAM(s) Oral two times a day    MEDICATIONS  (PRN):  sodium chloride 0.9% Bolus. 100 milliLiter(s) IV Bolus every 5 minutes PRN SBP LESS THAN or EQUAL to 80 mmHg      Allergies    No Known Allergies    Intolerances        LABS:                        6.8    1.05  )-----------( 46       ( 25 Oct 2024 07:09 )             20.6     10-25    138  |  98  |  34[H]  ----------------------------<  123[H]  3.6   |  24  |  4.95[H]    Ca    8.3[L]      25 Oct 2024 07:09  Phos  4.2     10-25  Mg     2.0     10-25    TPro  6.1  /  Alb  3.8  /  TBili  5.7[H]  /  DBili  x   /  AST  11  /  ALT  6[L]  /  AlkPhos  121[H]  10-25    PT/INR - ( 25 Oct 2024 07:09 )   PT: 15.6 sec;   INR: 1.36 ratio         PTT - ( 25 Oct 2024 07:09 )  PTT:30.1 sec  Urinalysis Basic - ( 25 Oct 2024 07:09 )    Color: x / Appearance: x / SG: x / pH: x  Gluc: 123 mg/dL / Ketone: x  / Bili: x / Urobili: x   Blood: x / Protein: x / Nitrite: x   Leuk Esterase: x / RBC: x / WBC x   Sq Epi: x / Non Sq Epi: x / Bacteria: x        RADIOLOGY & ADDITIONAL TESTS:  Studies reviewed.    ASSESSMENT & PLAN:

## 2024-10-25 NOTE — PROGRESS NOTE ADULT - ATTENDING COMMENTS
sent from [facility] 10/23 due to hypotension, weakness  #esrd on hd  mwf  missed hd wed  s/p hd yesterday 10/24  plan hd today per maintenance hd schedule   #hyperkalemia  k stable now  #etiology of hypotension?    bp still low  cultures pending  uf 0.5kg if Bp brendan today  #anemia  myelofibrosis, known to dr yen whitman  monitor hb trend, lower today, heme/med managemnet for tx  getting micera as outpt; last 200mg 10/21  last hb 9.2 on 10.16

## 2024-10-25 NOTE — PROGRESS NOTE ADULT - PROBLEM SELECTOR PLAN 5
HD cancelled yesterday d/t hypotension and lethargy  - nephrology recs appreciated  - renal diet HD cancelled yesterday d/t hypotension and lethargy  - nephrology recs appreciated  - renal diet  - went for HD yesterday, will go again today  - transfuse 1U pRBCs before dialysis today

## 2024-10-25 NOTE — PROVIDER CONTACT NOTE (CRITICAL VALUE NOTIFICATION) - ACTION/TREATMENT ORDERED:
Plan  1.  Will have her recheck before patient leaves clinic today.    2. Will have complete metabolic panel, CBC, UA, and chest x-ray. Can do today    3.  She will schedule preop exam with me.    4.  Referral to Cardiology for murmur and preoperative clearance.   Md made aware.  One unit of PRBC ordered to be given during HD

## 2024-10-25 NOTE — PROGRESS NOTE ADULT - PROBLEM SELECTOR PLAN 3
Hx of pancytopenia 2/2 myelofibrosis. On hydroxyurea and Ojjaara   - f/u iron studies, B12, folate, LDH, reticulocyte, haptoglobin labs   - heme/onc consulted  - holding home hydrea and ojjaara pending heme recs Hx of pancytopenia 2/2 myelofibrosis. On hydroxyurea and Ojjaara   - f/u iron studies, B12, folate, LDH, reticulocyte, haptoglobin labs   - heme/onc consulted  - holding home hydrea and inrebic pending heme recs Hx of pancytopenia 2/2 myelofibrosis. On hydroxyurea and Ojjaara   - f/u iron studies, B12, folate, LDH, reticulocyte, haptoglobin labs   - heme/onc consulted  - Heme rec'ed c/w hydroxyurea and Inrebic  - No Inrebic available inpatient pharm. Pt agrees to ask family bring own home bottle to get verified by pharm to continue to take it. (Pt last took it on Monday. Heme is concerned on rebounding)

## 2024-10-25 NOTE — PROGRESS NOTE ADULT - PROBLEM SELECTOR PLAN 2
Acute on chronic anemia with hgb 7, below baseline of around 9 with reported melena, concerning for UGIB in setting of gastric varices. Last melanic stool reportedly 2 days ago, doubt continued bleed  -s/p 1u prbc in ED, with inappropriately low hgb trend  - monitor stool output  - CBC q12  - maintain active T&S  - PPI BID  - if pt develops sign of UGIB, start octreotide gtt, consult GI

## 2024-10-25 NOTE — PROGRESS NOTE ADULT - PROBLEM SELECTOR PLAN 1
Meet SIRS criteria, with fever, leukopenia and tachycardia, c/f bacteremia post vascular procedure w/ stent placement. S/p vanc/zosyn in ED x 1  - F/u BCx/UCx  - c/w zosyn empirically   - MRSA pcr  - GI pcr  - UA/RVP/CXR neg

## 2024-10-26 LAB
ALBUMIN SERPL ELPH-MCNC: 3.7 G/DL — SIGNIFICANT CHANGE UP (ref 3.3–5)
ALP SERPL-CCNC: 111 U/L — SIGNIFICANT CHANGE UP (ref 40–120)
ALT FLD-CCNC: 9 U/L — LOW (ref 10–45)
ANION GAP SERPL CALC-SCNC: 13 MMOL/L — SIGNIFICANT CHANGE UP (ref 5–17)
ANISOCYTOSIS BLD QL: SLIGHT — SIGNIFICANT CHANGE UP
AST SERPL-CCNC: 11 U/L — SIGNIFICANT CHANGE UP (ref 10–40)
BASOPHILS # BLD AUTO: 0.01 K/UL — SIGNIFICANT CHANGE UP (ref 0–0.2)
BASOPHILS NFR BLD AUTO: 1 % — SIGNIFICANT CHANGE UP (ref 0–2)
BILIRUB SERPL-MCNC: 4.4 MG/DL — HIGH (ref 0.2–1.2)
BUN SERPL-MCNC: 15 MG/DL — SIGNIFICANT CHANGE UP (ref 7–23)
CALCIUM SERPL-MCNC: 8.6 MG/DL — SIGNIFICANT CHANGE UP (ref 8.4–10.5)
CHLORIDE SERPL-SCNC: 100 MMOL/L — SIGNIFICANT CHANGE UP (ref 96–108)
CO2 SERPL-SCNC: 26 MMOL/L — SIGNIFICANT CHANGE UP (ref 22–31)
CREAT SERPL-MCNC: 4.07 MG/DL — HIGH (ref 0.5–1.3)
CULTURE RESULTS: SIGNIFICANT CHANGE UP
DACRYOCYTES BLD QL SMEAR: SLIGHT — SIGNIFICANT CHANGE UP
EGFR: 12 ML/MIN/1.73M2 — LOW
EOSINOPHIL # BLD AUTO: 0.03 K/UL — SIGNIFICANT CHANGE UP (ref 0–0.5)
EOSINOPHIL NFR BLD AUTO: 3 % — SIGNIFICANT CHANGE UP (ref 0–6)
GLUCOSE SERPL-MCNC: 116 MG/DL — HIGH (ref 70–99)
HCT VFR BLD CALC: 25.2 % — LOW (ref 34.5–45)
HGB BLD-MCNC: 8.4 G/DL — LOW (ref 11.5–15.5)
INR BLD: 1.24 RATIO — HIGH (ref 0.85–1.16)
LYMPHOCYTES # BLD AUTO: 0.33 K/UL — LOW (ref 1–3.3)
LYMPHOCYTES # BLD AUTO: 29 % — SIGNIFICANT CHANGE UP (ref 13–44)
MACROCYTES BLD QL: SLIGHT — SIGNIFICANT CHANGE UP
MAGNESIUM SERPL-MCNC: 1.9 MG/DL — SIGNIFICANT CHANGE UP (ref 1.6–2.6)
MANUAL SMEAR VERIFICATION: SIGNIFICANT CHANGE UP
MCHC RBC-ENTMCNC: 33.3 GM/DL — SIGNIFICANT CHANGE UP (ref 32–36)
MCHC RBC-ENTMCNC: 36.2 PG — HIGH (ref 27–34)
MCV RBC AUTO: 108.6 FL — HIGH (ref 80–100)
MELD SCORE WITH DIALYSIS: 28 POINTS — SIGNIFICANT CHANGE UP
MELD SCORE WITHOUT DIALYSIS: 28 POINTS — SIGNIFICANT CHANGE UP
MONOCYTES # BLD AUTO: 0.11 K/UL — SIGNIFICANT CHANGE UP (ref 0–0.9)
MONOCYTES NFR BLD AUTO: 10 % — SIGNIFICANT CHANGE UP (ref 2–14)
NEUTROPHILS # BLD AUTO: 0.64 K/UL — LOW (ref 1.8–7.4)
NEUTROPHILS NFR BLD AUTO: 57 % — SIGNIFICANT CHANGE UP (ref 43–77)
NRBC # BLD: 2 /100 WBCS — HIGH (ref 0–0)
OVALOCYTES BLD QL SMEAR: SLIGHT — SIGNIFICANT CHANGE UP
PHOSPHATE SERPL-MCNC: 3.1 MG/DL — SIGNIFICANT CHANGE UP (ref 2.5–4.5)
PLAT MORPH BLD: NORMAL — SIGNIFICANT CHANGE UP
PLATELET # BLD AUTO: 49 K/UL — LOW (ref 150–400)
POIKILOCYTOSIS BLD QL AUTO: SLIGHT — SIGNIFICANT CHANGE UP
POTASSIUM SERPL-MCNC: 3.7 MMOL/L — SIGNIFICANT CHANGE UP (ref 3.5–5.3)
POTASSIUM SERPL-SCNC: 3.7 MMOL/L — SIGNIFICANT CHANGE UP (ref 3.5–5.3)
PROT SERPL-MCNC: 6.2 G/DL — SIGNIFICANT CHANGE UP (ref 6–8.3)
PROTHROM AB SERPL-ACNC: 14.2 SEC — HIGH (ref 9.9–13.4)
RBC # BLD: 2.32 M/UL — LOW (ref 3.8–5.2)
RBC # FLD: SIGNIFICANT CHANGE UP (ref 10.3–14.5)
RBC BLD AUTO: ABNORMAL
SODIUM SERPL-SCNC: 139 MMOL/L — SIGNIFICANT CHANGE UP (ref 135–145)
SPECIMEN SOURCE: SIGNIFICANT CHANGE UP
WBC # BLD: 1.13 K/UL — LOW (ref 3.8–10.5)
WBC # FLD AUTO: 1.13 K/UL — LOW (ref 3.8–10.5)

## 2024-10-26 PROCEDURE — 74183 MRI ABD W/O CNTR FLWD CNTR: CPT | Mod: 26

## 2024-10-26 PROCEDURE — 99233 SBSQ HOSP IP/OBS HIGH 50: CPT | Mod: GC

## 2024-10-26 RX ORDER — MAGNESIUM, ALUMINUM HYDROXIDE 200-200 MG
30 TABLET,CHEWABLE ORAL ONCE
Refills: 0 | Status: COMPLETED | OUTPATIENT
Start: 2024-10-26 | End: 2024-10-26

## 2024-10-26 RX ADMIN — URSODIOL 500 MILLIGRAM(S): 500 TABLET, FILM COATED ORAL at 05:20

## 2024-10-26 RX ADMIN — PANTOPRAZOLE SODIUM 40 MILLIGRAM(S): 40 TABLET, DELAYED RELEASE ORAL at 16:51

## 2024-10-26 RX ADMIN — Medication 30 MILLILITER(S): at 05:21

## 2024-10-26 RX ADMIN — Medication 1 TABLET(S): at 12:48

## 2024-10-26 RX ADMIN — URSODIOL 500 MILLIGRAM(S): 500 TABLET, FILM COATED ORAL at 16:51

## 2024-10-26 RX ADMIN — Medication 500 MILLIGRAM(S): at 12:48

## 2024-10-26 RX ADMIN — PANTOPRAZOLE SODIUM 40 MILLIGRAM(S): 40 TABLET, DELAYED RELEASE ORAL at 05:20

## 2024-10-26 RX ADMIN — PIPERACILLIN AND TAZOBACTAM 25 GRAM(S): .5; 4 INJECTION, POWDER, LYOPHILIZED, FOR SOLUTION INTRAVENOUS at 17:12

## 2024-10-26 RX ADMIN — PIPERACILLIN AND TAZOBACTAM 25 GRAM(S): .5; 4 INJECTION, POWDER, LYOPHILIZED, FOR SOLUTION INTRAVENOUS at 05:21

## 2024-10-26 NOTE — PROGRESS NOTE ADULT - PROBLEM SELECTOR PLAN 5
HD cancelled yesterday d/t hypotension and lethargy  - nephrology recs appreciated  - renal diet  - went for HD yesterday, will go again today  - transfuse 1U pRBCs before dialysis today HD cancelled yesterday d/t hypotension and lethargy  - nephrology recs appreciated  - renal diet  - MWF HD

## 2024-10-26 NOTE — PROGRESS NOTE ADULT - PROBLEM SELECTOR PLAN 1
Meet SIRS criteria, with fever, leukopenia and tachycardia, c/f bacteremia post vascular procedure w/ stent placement. S/p vanc/zosyn in ED x 1  - F/u BCx/UCx  - c/w zosyn empirically   - MRSA pcr  - GI pcr  - UA/RVP/CXR neg Meet SIRS criteria, with fever, leukopenia and tachycardia, c/f bacteremia post vascular procedure w/ stent placement. S/p vanc/zosyn in ED x 1  - F/u BCx/UCx  - c/w zosyn empirically   - MRSA pcr  - GI pcr  - UA/RVP/CXR neg  -Rotavirus + 10/25-> fluid resuscitation and precautions Meet SIRS criteria, with fever, leukopenia and tachycardia, c/f bacteremia post vascular procedure w/ stent placement. S/p vanc/zosyn in ED x 1  - F/u BCx/UCx  - c/w zosyn empirically   - MRSA pcr  - GI pcr  - UA/RVP/CXR neg  -Rotavirus + 10/25-> fluid resuscitation and precautions  -CBD dilation on imaging, MRCP for 10/27, notified renal of HD post MRCP on 10/28

## 2024-10-26 NOTE — PROGRESS NOTE ADULT - PROBLEM SELECTOR PLAN 3
Hx of pancytopenia 2/2 myelofibrosis. On hydroxyurea and Ojjaara   - f/u iron studies, B12, folate, LDH, reticulocyte, haptoglobin labs   - heme/onc consulted  - Heme rec'ed c/w hydroxyurea and Inrebic  - No Inrebic available inpatient pharm. Pt agrees to ask family bring own home bottle to get verified by pharm to continue to take it. (Pt last took it on Monday. Heme is concerned on rebounding) Hx of pancytopenia 2/2 myelofibrosis. On hydroxyurea and Ojjaara   - f/u iron studies, B12, folate, LDH, reticulocyte, haptoglobin labs   - heme/onc consulted  - Heme rec'ed c/w hydroxyurea and Inrebic  - Continue Inrebic

## 2024-10-26 NOTE — PROGRESS NOTE ADULT - PROBLEM SELECTOR PLAN 2
Acute on chronic anemia with hgb 7, below baseline of around 9 with reported melena, concerning for UGIB in setting of gastric varices. Last melanic stool reportedly 2 days ago, doubt continued bleed  -s/p 1u prbc in ED, with inappropriately low hgb trend  - monitor stool output  - CBC q12  - maintain active T&S  - PPI BID  - if pt develops sign of UGIB, start octreotide gtt, consult GI Acute on chronic anemia with hgb 7, below baseline of around 9 with reported melena, concerning for UGIB in setting of gastric varices. Last melanic stool reportedly 2 days ago, doubt continued bleed  -s/p 1u prbc in ED, with inappropriately low hgb trend, s/p 1u pRBC on 10/25  - monitor stool output  - CBC q12  - maintain active T&S  - PPI BID  - if pt develops sign of UGIB, start octreotide gtt, consult GI

## 2024-10-26 NOTE — PROGRESS NOTE ADULT - ATTENDING COMMENTS
# Clinical sepsis w/ hypotension, recent vascular procedure  # Acute on chronic anemia 2/2 possible UGIB i/s/o gastric varices  # Pancytopenia  # Polycythemia vera w/ secondary myelofibrosis  # noncirrhotic portal HTN  # neutropenic fever  # ESRD on HD    - on empiric zosyn for sepsis. f/u blood cx, urine cx. neutropenic precautions.   - US RUQ with dilated CBD. check MRCP, alert nephro team to coordinate HD for MRCP.   - GI pcr + rotavirus  - s/p 1u prbc 10/24, 1u prbc 10/25. ppi bid, cbc q12h. tranfsuse to keep hgb >7, PLT >10 or >20 if febrile  - no evidence of acute bleed. labs consistent with hemolysis but no schistocytes, smear negative per hem onc  - appreciate hem onc recs, on Inrebic , hydroxyurea   - holding coreg due to hypotension

## 2024-10-26 NOTE — PROGRESS NOTE ADULT - SUBJECTIVE AND OBJECTIVE BOX
***************************************************************  Jacob Perez, PGY2  Internal Medicine   Preferred contact via [employer] Teams   ***************************************************************    MIHYEON HU  64y  MRN: [id number]    Patient is a 64y old  Female who presents with a chief complaint of fever, lethargy, hypotension (25 Oct 2024 10:47)      Interval/Overnight Events: no events ON.     Subjective: Pt seen and examined at bedside. Denies fever, CP, SOB, abn pain, N/V, dysuria. Tolerating diet.      MEDICATIONS  (STANDING):  chlorhexidine 2% Cloths 1 Application(s) Topical daily  hydroxyurea 500 milliGRAM(s) Oral daily  Inrebic 100 mg 2 Capsule(s) 2 Capsule(s) Oral <User Schedule>  Nephro-deonna 1 Tablet(s) Oral daily  pantoprazole    Tablet 40 milliGRAM(s) Oral two times a day  piperacillin/tazobactam IVPB.. 3.375 Gram(s) IV Intermittent every 12 hours  ursodiol Tablet 500 milliGRAM(s) Oral two times a day    MEDICATIONS  (PRN):  sodium chloride 0.9% Bolus. 100 milliLiter(s) IV Bolus every 5 minutes PRN SBP LESS THAN or EQUAL to 80 mmHg      Objective:    Vitals: Vital Signs Last 24 Hrs  T(C): 37.3 (10-25-24 @ 21:19), Max: 37.3 (10-25-24 @ 21:19)  T(F): 99.2 (10-25-24 @ 21:19), Max: 99.2 (10-25-24 @ 21:19)  HR: 93 (10-25-24 @ 21:19) (76 - 93)  BP: 108/60 (10-25-24 @ 21:19) (108/60 - 125/54)  BP(mean): --  RR: 20 (10-25-24 @ 21:19) (18 - 20)  SpO2: 97% (10-25-24 @ 21:19) (97% - 100%)                I&O's Summary    25 Oct 2024 07:01  -  26 Oct 2024 06:49  --------------------------------------------------------  IN: 1 mL / OUT: 500 mL / NET: -499 mL        PHYSICAL EXAM:  GENERAL: NAD  HEAD:  Atraumatic, Normocephalic  EYES: EOMI, conjunctiva and sclera clear  CHEST/LUNG: Clear to auscultation bilaterally; No rales, rhonchi, wheezing, or rubs  HEART: Regular rate and rhythm; No murmurs, rubs, or gallops  ABDOMEN: Soft, Nontender, Nondistended;   SKIN: No rashes or lesions  NERVOUS SYSTEM:  Alert & Oriented X3, no focal deficits    LABS:                        6.8    1.05  )-----------( 46       ( 25 Oct 2024 07:09 )             20.6                         7.2    1.18  )-----------( 51       ( 24 Oct 2024 17:24 )             21.1                         7.1    1.31  )-----------( 41       ( 24 Oct 2024 06:24 )             21.3     10-25    138  |  98  |  34[H]  ----------------------------<  123[H]  3.6   |  24  |  4.95[H]  10-24    125[L]  |  91[L]  |  67[H]  ----------------------------<  138[H]  5.1   |  18[L]  |  7.15[H]  10-23    131[L]  |  95[L]  |  62[H]  ----------------------------<  102[H]  5.6[H]   |  19[L]  |  6.86[H]    Ca    8.3[L]      25 Oct 2024 07:09  Ca    8.3[L]      24 Oct 2024 07:26  Ca    8.9      23 Oct 2024 20:48  Phos  4.2     10-25  Mg     2.0     10-25    TPro  6.1  /  Alb  3.8  /  TBili  5.7[H]  /  DBili  x   /  AST  11  /  ALT  6[L]  /  AlkPhos  121[H]  10-25  TPro  5.9[L]  /  Alb  3.6  /  TBili  5.4[H]  /  DBili  0.4[H]  /  AST  11  /  ALT  8[L]  /  AlkPhos  112  10-24  TPro  6.6  /  Alb  4.1  /  TBili  5.5[H]  /  DBili  x   /  AST  12  /  ALT  8[L]  /  AlkPhos  124[H]  10-23    CAPILLARY BLOOD GLUCOSE        PT/INR - ( 25 Oct 2024 07:09 )   PT: 15.6 sec;   INR: 1.36 ratio         PTT - ( 25 Oct 2024 07:09 )  PTT:30.1 sec    Urinalysis Basic - ( 25 Oct 2024 07:09 )    Color: x / Appearance: x / SG: x / pH: x  Gluc: 123 mg/dL / Ketone: x  / Bili: x / Urobili: x   Blood: x / Protein: x / Nitrite: x   Leuk Esterase: x / RBC: x / WBC x   Sq Epi: x / Non Sq Epi: x / Bacteria: x          RADIOLOGY & ADDITIONAL TESTS:    Imaging Personally Reviewed:  [x ] YES  [ ] NO    Consultants involved in case:   Consultant(s) Notes Reviewed:  [ x] YES  [ ] NO:   Care Discussed with Consultants/Other Providers [x ] YES  [ ] NO         ***************************************************************  Jacob Perez, PGY2  Internal Medicine   Preferred contact via [employer] Teams   ***************************************************************    MIHYEON HU  64y  MRN: [id number]    Patient is a 64y old  Female who presents with a chief complaint of fever, lethargy, hypotension (25 Oct 2024 10:47)      Interval/Overnight Events: no events ON.     Subjective: No complaints     MEDICATIONS  (STANDING):  chlorhexidine 2% Cloths 1 Application(s) Topical daily  hydroxyurea 500 milliGRAM(s) Oral daily  Inrebic 100 mg 2 Capsule(s) 2 Capsule(s) Oral <User Schedule>  Nephro-deonna 1 Tablet(s) Oral daily  pantoprazole    Tablet 40 milliGRAM(s) Oral two times a day  piperacillin/tazobactam IVPB.. 3.375 Gram(s) IV Intermittent every 12 hours  ursodiol Tablet 500 milliGRAM(s) Oral two times a day    MEDICATIONS  (PRN):  sodium chloride 0.9% Bolus. 100 milliLiter(s) IV Bolus every 5 minutes PRN SBP LESS THAN or EQUAL to 80 mmHg      Objective:    Vitals: Vital Signs Last 24 Hrs  T(C): 37.3 (10-25-24 @ 21:19), Max: 37.3 (10-25-24 @ 21:19)  T(F): 99.2 (10-25-24 @ 21:19), Max: 99.2 (10-25-24 @ 21:19)  HR: 93 (10-25-24 @ 21:19) (76 - 93)  BP: 108/60 (10-25-24 @ 21:19) (108/60 - 125/54)  BP(mean): --  RR: 20 (10-25-24 @ 21:19) (18 - 20)  SpO2: 97% (10-25-24 @ 21:19) (97% - 100%)                I&O's Summary    25 Oct 2024 07:01  -  26 Oct 2024 06:49  --------------------------------------------------------  IN: 1 mL / OUT: 500 mL / NET: -499 mL        PHYSICAL EXAM:  GENERAL: NAD  HEAD:  Atraumatic, Normocephalic  EYES: EOMI, conjunctiva and sclera clear  CHEST/LUNG: Clear to auscultation bilaterally; No rales, rhonchi, wheezing, or rubs  HEART: Regular rate and rhythm; No murmurs, rubs, or gallops  ABDOMEN: Soft, Nontender, Nondistended;   SKIN: No rashes or lesions  NERVOUS SYSTEM:  Alert & Oriented X3, no focal deficits    LABS:                        6.8    1.05  )-----------( 46       ( 25 Oct 2024 07:09 )             20.6                         7.2    1.18  )-----------( 51       ( 24 Oct 2024 17:24 )             21.1                         7.1    1.31  )-----------( 41       ( 24 Oct 2024 06:24 )             21.3     10-25    138  |  98  |  34[H]  ----------------------------<  123[H]  3.6   |  24  |  4.95[H]  10-24    125[L]  |  91[L]  |  67[H]  ----------------------------<  138[H]  5.1   |  18[L]  |  7.15[H]  10-23    131[L]  |  95[L]  |  62[H]  ----------------------------<  102[H]  5.6[H]   |  19[L]  |  6.86[H]    Ca    8.3[L]      25 Oct 2024 07:09  Ca    8.3[L]      24 Oct 2024 07:26  Ca    8.9      23 Oct 2024 20:48  Phos  4.2     10-25  Mg     2.0     10-25    TPro  6.1  /  Alb  3.8  /  TBili  5.7[H]  /  DBili  x   /  AST  11  /  ALT  6[L]  /  AlkPhos  121[H]  10-25  TPro  5.9[L]  /  Alb  3.6  /  TBili  5.4[H]  /  DBili  0.4[H]  /  AST  11  /  ALT  8[L]  /  AlkPhos  112  10-24  TPro  6.6  /  Alb  4.1  /  TBili  5.5[H]  /  DBili  x   /  AST  12  /  ALT  8[L]  /  AlkPhos  124[H]  10-23    CAPILLARY BLOOD GLUCOSE        PT/INR - ( 25 Oct 2024 07:09 )   PT: 15.6 sec;   INR: 1.36 ratio         PTT - ( 25 Oct 2024 07:09 )  PTT:30.1 sec    Urinalysis Basic - ( 25 Oct 2024 07:09 )    Color: x / Appearance: x / SG: x / pH: x  Gluc: 123 mg/dL / Ketone: x  / Bili: x / Urobili: x   Blood: x / Protein: x / Nitrite: x   Leuk Esterase: x / RBC: x / WBC x   Sq Epi: x / Non Sq Epi: x / Bacteria: x          RADIOLOGY & ADDITIONAL TESTS:    Imaging Personally Reviewed:  [x ] YES  [ ] NO    Consultants involved in case:   Consultant(s) Notes Reviewed:  [ x] YES  [ ] NO:   Care Discussed with Consultants/Other Providers [x ] YES  [ ] NO

## 2024-10-27 LAB
ALBUMIN SERPL ELPH-MCNC: 3.6 G/DL — SIGNIFICANT CHANGE UP (ref 3.3–5)
ALP SERPL-CCNC: 101 U/L — SIGNIFICANT CHANGE UP (ref 40–120)
ALT FLD-CCNC: 8 U/L — LOW (ref 10–45)
ANION GAP SERPL CALC-SCNC: 11 MMOL/L — SIGNIFICANT CHANGE UP (ref 5–17)
AST SERPL-CCNC: 12 U/L — SIGNIFICANT CHANGE UP (ref 10–40)
BASOPHILS # BLD AUTO: 0 K/UL — SIGNIFICANT CHANGE UP (ref 0–0.2)
BASOPHILS NFR BLD AUTO: 0 % — SIGNIFICANT CHANGE UP (ref 0–2)
BILIRUB SERPL-MCNC: 2.9 MG/DL — HIGH (ref 0.2–1.2)
BUN SERPL-MCNC: 22 MG/DL — SIGNIFICANT CHANGE UP (ref 7–23)
CALCIUM SERPL-MCNC: 8.6 MG/DL — SIGNIFICANT CHANGE UP (ref 8.4–10.5)
CHLORIDE SERPL-SCNC: 102 MMOL/L — SIGNIFICANT CHANGE UP (ref 96–108)
CO2 SERPL-SCNC: 25 MMOL/L — SIGNIFICANT CHANGE UP (ref 22–31)
CREAT SERPL-MCNC: 5.47 MG/DL — HIGH (ref 0.5–1.3)
EGFR: 8 ML/MIN/1.73M2 — LOW
EOSINOPHIL # BLD AUTO: 0.02 K/UL — SIGNIFICANT CHANGE UP (ref 0–0.5)
EOSINOPHIL NFR BLD AUTO: 1.5 % — SIGNIFICANT CHANGE UP (ref 0–6)
GLUCOSE SERPL-MCNC: 92 MG/DL — SIGNIFICANT CHANGE UP (ref 70–99)
HCT VFR BLD CALC: 24.9 % — LOW (ref 34.5–45)
HGB BLD-MCNC: 8.1 G/DL — LOW (ref 11.5–15.5)
IMM GRANULOCYTES NFR BLD AUTO: 1.5 % — HIGH (ref 0–0.9)
LYMPHOCYTES # BLD AUTO: 0.48 K/UL — LOW (ref 1–3.3)
LYMPHOCYTES # BLD AUTO: 36.4 % — SIGNIFICANT CHANGE UP (ref 13–44)
MAGNESIUM SERPL-MCNC: 2 MG/DL — SIGNIFICANT CHANGE UP (ref 1.6–2.6)
MCHC RBC-ENTMCNC: 32.5 GM/DL — SIGNIFICANT CHANGE UP (ref 32–36)
MCHC RBC-ENTMCNC: 35.1 PG — HIGH (ref 27–34)
MCV RBC AUTO: 107.8 FL — HIGH (ref 80–100)
MONOCYTES # BLD AUTO: 0.13 K/UL — SIGNIFICANT CHANGE UP (ref 0–0.9)
MONOCYTES NFR BLD AUTO: 9.8 % — SIGNIFICANT CHANGE UP (ref 2–14)
NEUTROPHILS # BLD AUTO: 0.67 K/UL — LOW (ref 1.8–7.4)
NEUTROPHILS NFR BLD AUTO: 50.8 % — SIGNIFICANT CHANGE UP (ref 43–77)
NRBC # BLD: 0 /100 WBCS — SIGNIFICANT CHANGE UP (ref 0–0)
PHOSPHATE SERPL-MCNC: 3.4 MG/DL — SIGNIFICANT CHANGE UP (ref 2.5–4.5)
PLATELET # BLD AUTO: 57 K/UL — LOW (ref 150–400)
POTASSIUM SERPL-MCNC: 3.8 MMOL/L — SIGNIFICANT CHANGE UP (ref 3.5–5.3)
POTASSIUM SERPL-SCNC: 3.8 MMOL/L — SIGNIFICANT CHANGE UP (ref 3.5–5.3)
PROT SERPL-MCNC: 6 G/DL — SIGNIFICANT CHANGE UP (ref 6–8.3)
RBC # BLD: 2.31 M/UL — LOW (ref 3.8–5.2)
RBC # FLD: SIGNIFICANT CHANGE UP (ref 10.3–14.5)
SODIUM SERPL-SCNC: 138 MMOL/L — SIGNIFICANT CHANGE UP (ref 135–145)
WBC # BLD: 1.32 K/UL — LOW (ref 3.8–10.5)
WBC # FLD AUTO: 1.32 K/UL — LOW (ref 3.8–10.5)

## 2024-10-27 PROCEDURE — 99232 SBSQ HOSP IP/OBS MODERATE 35: CPT | Mod: GC

## 2024-10-27 RX ADMIN — PANTOPRAZOLE SODIUM 40 MILLIGRAM(S): 40 TABLET, DELAYED RELEASE ORAL at 06:09

## 2024-10-27 RX ADMIN — URSODIOL 500 MILLIGRAM(S): 500 TABLET, FILM COATED ORAL at 06:09

## 2024-10-27 RX ADMIN — PIPERACILLIN AND TAZOBACTAM 25 GRAM(S): .5; 4 INJECTION, POWDER, LYOPHILIZED, FOR SOLUTION INTRAVENOUS at 06:09

## 2024-10-27 RX ADMIN — Medication 500 MILLIGRAM(S): at 11:32

## 2024-10-27 RX ADMIN — CHLORHEXIDINE GLUCONATE 1 APPLICATION(S): 40 SOLUTION TOPICAL at 11:27

## 2024-10-27 RX ADMIN — URSODIOL 500 MILLIGRAM(S): 500 TABLET, FILM COATED ORAL at 18:31

## 2024-10-27 RX ADMIN — PIPERACILLIN AND TAZOBACTAM 25 GRAM(S): .5; 4 INJECTION, POWDER, LYOPHILIZED, FOR SOLUTION INTRAVENOUS at 18:31

## 2024-10-27 RX ADMIN — PANTOPRAZOLE SODIUM 40 MILLIGRAM(S): 40 TABLET, DELAYED RELEASE ORAL at 18:32

## 2024-10-27 RX ADMIN — Medication 1 TABLET(S): at 11:33

## 2024-10-27 NOTE — PROGRESS NOTE ADULT - PROBLEM SELECTOR PLAN 5
HD cancelled yesterday d/t hypotension and lethargy  - nephrology recs appreciated  - renal diet  - MWF HD

## 2024-10-27 NOTE — PROGRESS NOTE ADULT - PROBLEM SELECTOR PLAN 1
Meet SIRS criteria, with fever, leukopenia and tachycardia, c/f bacteremia post vascular procedure w/ stent placement. S/p vanc/zosyn in ED x 1  - F/u BCx/UCx  - c/w zosyn empirically   - MRSA pcr  - GI pcr  - UA/RVP/CXR neg  -Rotavirus + 10/25-> fluid resuscitation and precautions  -CBD dilation on imaging, MRCP for 10/27, notified renal of HD post MRCP on 10/28

## 2024-10-27 NOTE — PROGRESS NOTE ADULT - PROBLEM SELECTOR PLAN 2
Acute on chronic anemia with hgb 7, below baseline of around 9 with reported melena, concerning for UGIB in setting of gastric varices. Last melanic stool reportedly 2 days ago, doubt continued bleed  -s/p 1u prbc in ED, with inappropriately low hgb trend, s/p 1u pRBC on 10/25  - monitor stool output  - CBC q12  - maintain active T&S  - PPI BID  - if pt develops sign of UGIB, start octreotide gtt, consult GI

## 2024-10-27 NOTE — PROGRESS NOTE ADULT - SUBJECTIVE AND OBJECTIVE BOX
***************************************************************  Juan Carlos Shah, PGY-1  on TEAMS  ***************************************************************    MIHYEON HU ([id number])- Patient is a 64y old  Female who presents with a chief complaint of fever, lethargy, hypotension (26 Oct 2024 06:49)      INTERVAL/OVERNIGHT EVENTS/SUBJECTIVE:   No acute events overnight.     Pt seen at bedside; denies n/v, sob, chest pain.     PHYSICAL EXAM:  GENERAL: NAD  HEAD:  Atraumatic, Normocephalic  EYES: EOMI, conjunctiva clear. Slightly yellow sclera BL.  CHEST/LUNG: Clear to auscultation bilaterally; No rales, rhonchi, wheezing, or rubs  HEART: Regular rate and rhythm; No murmurs, rubs, or gallops  ABDOMEN: Soft, Nontender, Nondistended;   SKIN: No rashes or lesions  NERVOUS SYSTEM:  Alert & Oriented X3, no focal deficits      Vital Signs Last 24 Hrs  T(C): 36.6 (27 Oct 2024 04:22), Max: 36.8 (26 Oct 2024 20:32)  T(F): 97.9 (27 Oct 2024 04:22), Max: 98.3 (26 Oct 2024 20:32)  HR: 80 (27 Oct 2024 04:22) (73 - 80)  BP: 124/66 (27 Oct 2024 04:22) (119/65 - 134/72)  BP(mean): --  RR: 18 (27 Oct 2024 04:22) (18 - 18)  SpO2: 99% (27 Oct 2024 04:22) (98% - 100%)    Parameters below as of 27 Oct 2024 04:22  Patient On (Oxygen Delivery Method): room air        LABS:                        8.1    1.32  )-----------( 57       ( 27 Oct 2024 06:44 )             24.9     10-27    138  |  102  |  22  ----------------------------<  92  3.8   |  25  |  5.47[H]    Ca    8.6      27 Oct 2024 06:44  Phos  3.4     10-27  Mg     2.0     10-27    TPro  6.0  /  Alb  3.6  /  TBili  2.9[H]  /  DBili  x   /  AST  12  /  ALT  8[L]  /  AlkPhos  101  10-27      Lactate Trend    PT/INR - ( 26 Oct 2024 07:17 )   PT: 14.2 sec;   INR: 1.24 ratio           Urinalysis Basic - ( 27 Oct 2024 06:44 )    Color: x / Appearance: x / SG: x / pH: x  Gluc: 92 mg/dL / Ketone: x  / Bili: x / Urobili: x   Blood: x / Protein: x / Nitrite: x   Leuk Esterase: x / RBC: x / WBC x   Sq Epi: x / Non Sq Epi: x / Bacteria: x          CAPILLARY BLOOD GLUCOSE          I&O's Summary      Medications:  MEDICATIONS  (STANDING):  chlorhexidine 2% Cloths 1 Application(s) Topical daily  hydroxyurea 500 milliGRAM(s) Oral daily  Inrebic 100 mg 2 Capsule(s) 2 Capsule(s) Oral <User Schedule>  Nephro-deonna 1 Tablet(s) Oral daily  pantoprazole    Tablet 40 milliGRAM(s) Oral two times a day  piperacillin/tazobactam IVPB.. 3.375 Gram(s) IV Intermittent every 12 hours  ursodiol Tablet 500 milliGRAM(s) Oral two times a day    MEDICATIONS  (PRN):  sodium chloride 0.9% Bolus. 100 milliLiter(s) IV Bolus every 5 minutes PRN SBP LESS THAN or EQUAL to 80 mmHg      RADIOLOGY & ADDITIONAL TESTS were viewed by me personally.     TELEMETRY: reviewed    EKG: reviewed    IMAGING: personally reviewed           ***************************************************************  Ramiromeche Pablo, PGY-1  on TEAMS  ***************************************************************    MIHYEON HU ([id number])- Patient is a 64y old  Female who presents with a chief complaint of fever, lethargy, hypotension (26 Oct 2024 06:49)      INTERVAL/OVERNIGHT EVENTS/SUBJECTIVE:   No acute events overnight.     Pt seen at bedside; denies n/v, sob, chest pain.     PHYSICAL EXAM:  GENERAL: NAD, well-groomed, well-developed  HEAD:  NCAT  EYES: EOMI, PERRL, mild scleral icterus  ENMT: No tonsillar erythema, exudates, or enlargement; Moist mucous membranes  NECK: Supple, non tender, No JVD, Normal thyroid  HEART: Regular rate and rhythm; 2/6 holosystolic murmur present. No rubs, or gallops. S1/S2 present  RESPIRATORY: CTA B/L, No W/R/R  ABDOMEN: Soft, Nontender, Nondistended; Bowel sounds present. Splenomegaly present  NEUROLOGY: A&Ox3, nonfocal, moving all extremities,  EXTREMITIES:  2+ Peripheral Pulses, No clubbing, cyanosis, or edema. 5/5 muscle tone in UE/LE. Large fistula present on LUE.   SKIN: warm, dry, normal color, no rash or abnormal lesions      Vital Signs Last 24 Hrs  T(C): 36.6 (27 Oct 2024 04:22), Max: 36.8 (26 Oct 2024 20:32)  T(F): 97.9 (27 Oct 2024 04:22), Max: 98.3 (26 Oct 2024 20:32)  HR: 80 (27 Oct 2024 04:22) (73 - 80)  BP: 124/66 (27 Oct 2024 04:22) (119/65 - 134/72)  BP(mean): --  RR: 18 (27 Oct 2024 04:22) (18 - 18)  SpO2: 99% (27 Oct 2024 04:22) (98% - 100%)    Parameters below as of 27 Oct 2024 04:22  Patient On (Oxygen Delivery Method): room air        LABS:                        8.1    1.32  )-----------( 57       ( 27 Oct 2024 06:44 )             24.9     10-27    138  |  102  |  22  ----------------------------<  92  3.8   |  25  |  5.47[H]    Ca    8.6      27 Oct 2024 06:44  Phos  3.4     10-27  Mg     2.0     10-27    TPro  6.0  /  Alb  3.6  /  TBili  2.9[H]  /  DBili  x   /  AST  12  /  ALT  8[L]  /  AlkPhos  101  10-27      Lactate Trend    PT/INR - ( 26 Oct 2024 07:17 )   PT: 14.2 sec;   INR: 1.24 ratio           Urinalysis Basic - ( 27 Oct 2024 06:44 )    Color: x / Appearance: x / SG: x / pH: x  Gluc: 92 mg/dL / Ketone: x  / Bili: x / Urobili: x   Blood: x / Protein: x / Nitrite: x   Leuk Esterase: x / RBC: x / WBC x   Sq Epi: x / Non Sq Epi: x / Bacteria: x          CAPILLARY BLOOD GLUCOSE          I&O's Summary      Medications:  MEDICATIONS  (STANDING):  chlorhexidine 2% Cloths 1 Application(s) Topical daily  hydroxyurea 500 milliGRAM(s) Oral daily  Inrebic 100 mg 2 Capsule(s) 2 Capsule(s) Oral <User Schedule>  Nephro-deonna 1 Tablet(s) Oral daily  pantoprazole    Tablet 40 milliGRAM(s) Oral two times a day  piperacillin/tazobactam IVPB.. 3.375 Gram(s) IV Intermittent every 12 hours  ursodiol Tablet 500 milliGRAM(s) Oral two times a day    MEDICATIONS  (PRN):  sodium chloride 0.9% Bolus. 100 milliLiter(s) IV Bolus every 5 minutes PRN SBP LESS THAN or EQUAL to 80 mmHg      RADIOLOGY & ADDITIONAL TESTS were viewed by me personally.     TELEMETRY: reviewed    EKG: reviewed    IMAGING: personally reviewed

## 2024-10-27 NOTE — PROGRESS NOTE ADULT - PROBLEM SELECTOR PLAN 3
Hx of pancytopenia 2/2 myelofibrosis. On hydroxyurea and Ojjaara   - f/u iron studies, B12, folate, LDH, reticulocyte, haptoglobin labs   - heme/onc consulted  - Heme rec'ed c/w hydroxyurea and Inrebic  - Continue Inrebic

## 2024-10-27 NOTE — PROGRESS NOTE ADULT - ATTENDING COMMENTS
# Clinical sepsis w/ hypotension, recent vascular procedure  # Acute on chronic anemia 2/2 possible UGIB i/s/o gastric varices  # Pancytopenia  # Polycythemia vera w/ secondary myelofibrosis  # noncirrhotic portal HTN  # neutropenic fever  # ESRD on HD    - on empiric zosyn for sepsis. f/u blood cx, urine cx. neutropenic precautions.   - US RUQ with dilated CBD. check MRCP - results pending, alerted nephro team sat and sunday to coordinate HD for MRCP, F/U nephro team re HD plans.   - GI pcr + rotavirus  - s/p 1u prbc 10/24, 1u prbc 10/25. ppi bid, cbc q12h. transfuse to keep hgb >7, PLT >10 or >20 if febrile  - no evidence of acute bleed. labs consistent with hemolysis but no schistocytes, smear negative per hem onc  - appreciate hem onc recs, on Inrebic , hydroxyurea   - holding coreg due to hypotension

## 2024-10-28 LAB
ALBUMIN SERPL ELPH-MCNC: 3.6 G/DL — SIGNIFICANT CHANGE UP (ref 3.3–5)
ALP SERPL-CCNC: 109 U/L — SIGNIFICANT CHANGE UP (ref 40–120)
ALT FLD-CCNC: 10 U/L — SIGNIFICANT CHANGE UP (ref 10–45)
ANION GAP SERPL CALC-SCNC: 15 MMOL/L — SIGNIFICANT CHANGE UP (ref 5–17)
AST SERPL-CCNC: 11 U/L — SIGNIFICANT CHANGE UP (ref 10–40)
BASOPHILS # BLD AUTO: 0 K/UL — SIGNIFICANT CHANGE UP (ref 0–0.2)
BASOPHILS NFR BLD AUTO: 0 % — SIGNIFICANT CHANGE UP (ref 0–2)
BILIRUB SERPL-MCNC: 1.9 MG/DL — HIGH (ref 0.2–1.2)
BUN SERPL-MCNC: 35 MG/DL — HIGH (ref 7–23)
CALCIUM SERPL-MCNC: 8.6 MG/DL — SIGNIFICANT CHANGE UP (ref 8.4–10.5)
CHLORIDE SERPL-SCNC: 103 MMOL/L — SIGNIFICANT CHANGE UP (ref 96–108)
CO2 SERPL-SCNC: 20 MMOL/L — LOW (ref 22–31)
CREAT SERPL-MCNC: 7.07 MG/DL — HIGH (ref 0.5–1.3)
EGFR: 6 ML/MIN/1.73M2 — LOW
EOSINOPHIL # BLD AUTO: 0.02 K/UL — SIGNIFICANT CHANGE UP (ref 0–0.5)
EOSINOPHIL NFR BLD AUTO: 1.2 % — SIGNIFICANT CHANGE UP (ref 0–6)
GLUCOSE SERPL-MCNC: 101 MG/DL — HIGH (ref 70–99)
HCT VFR BLD CALC: 25.6 % — LOW (ref 34.5–45)
HGB BLD-MCNC: 8.4 G/DL — LOW (ref 11.5–15.5)
IMM GRANULOCYTES NFR BLD AUTO: 0.6 % — SIGNIFICANT CHANGE UP (ref 0–0.9)
LYMPHOCYTES # BLD AUTO: 0.47 K/UL — LOW (ref 1–3.3)
LYMPHOCYTES # BLD AUTO: 29 % — SIGNIFICANT CHANGE UP (ref 13–44)
MAGNESIUM SERPL-MCNC: 2 MG/DL — SIGNIFICANT CHANGE UP (ref 1.6–2.6)
MCHC RBC-ENTMCNC: 32.8 GM/DL — SIGNIFICANT CHANGE UP (ref 32–36)
MCHC RBC-ENTMCNC: 35.7 PG — HIGH (ref 27–34)
MCV RBC AUTO: 108.9 FL — HIGH (ref 80–100)
MONOCYTES # BLD AUTO: 0.11 K/UL — SIGNIFICANT CHANGE UP (ref 0–0.9)
MONOCYTES NFR BLD AUTO: 6.8 % — SIGNIFICANT CHANGE UP (ref 2–14)
NEUTROPHILS # BLD AUTO: 1.01 K/UL — LOW (ref 1.8–7.4)
NEUTROPHILS NFR BLD AUTO: 62.4 % — SIGNIFICANT CHANGE UP (ref 43–77)
NRBC # BLD: 0 /100 WBCS — SIGNIFICANT CHANGE UP (ref 0–0)
PHOSPHATE SERPL-MCNC: 3.8 MG/DL — SIGNIFICANT CHANGE UP (ref 2.5–4.5)
PLATELET # BLD AUTO: 56 K/UL — LOW (ref 150–400)
POTASSIUM SERPL-MCNC: 3.8 MMOL/L — SIGNIFICANT CHANGE UP (ref 3.5–5.3)
POTASSIUM SERPL-SCNC: 3.8 MMOL/L — SIGNIFICANT CHANGE UP (ref 3.5–5.3)
PROT SERPL-MCNC: 6 G/DL — SIGNIFICANT CHANGE UP (ref 6–8.3)
RBC # BLD: 2.35 M/UL — LOW (ref 3.8–5.2)
RBC # FLD: 23.9 % — HIGH (ref 10.3–14.5)
SODIUM SERPL-SCNC: 138 MMOL/L — SIGNIFICANT CHANGE UP (ref 135–145)
WBC # BLD: 1.62 K/UL — LOW (ref 3.8–10.5)
WBC # FLD AUTO: 1.62 K/UL — LOW (ref 3.8–10.5)

## 2024-10-28 PROCEDURE — 90935 HEMODIALYSIS ONE EVALUATION: CPT

## 2024-10-28 PROCEDURE — 99232 SBSQ HOSP IP/OBS MODERATE 35: CPT

## 2024-10-28 RX ORDER — CHLORHEXIDINE GLUCONATE 40 MG/ML
1 SOLUTION TOPICAL ONCE
Refills: 0 | Status: COMPLETED | OUTPATIENT
Start: 2024-10-28 | End: 2024-10-28

## 2024-10-28 RX ADMIN — Medication 500 MILLIGRAM(S): at 13:00

## 2024-10-28 RX ADMIN — PANTOPRAZOLE SODIUM 40 MILLIGRAM(S): 40 TABLET, DELAYED RELEASE ORAL at 17:36

## 2024-10-28 RX ADMIN — PIPERACILLIN AND TAZOBACTAM 25 GRAM(S): .5; 4 INJECTION, POWDER, LYOPHILIZED, FOR SOLUTION INTRAVENOUS at 06:40

## 2024-10-28 RX ADMIN — URSODIOL 500 MILLIGRAM(S): 500 TABLET, FILM COATED ORAL at 06:40

## 2024-10-28 RX ADMIN — CHLORHEXIDINE GLUCONATE 1 APPLICATION(S): 40 SOLUTION TOPICAL at 22:16

## 2024-10-28 RX ADMIN — Medication 1 TABLET(S): at 13:00

## 2024-10-28 RX ADMIN — URSODIOL 500 MILLIGRAM(S): 500 TABLET, FILM COATED ORAL at 17:36

## 2024-10-28 RX ADMIN — PANTOPRAZOLE SODIUM 40 MILLIGRAM(S): 40 TABLET, DELAYED RELEASE ORAL at 06:40

## 2024-10-28 NOTE — PROGRESS NOTE ADULT - ATTENDING COMMENTS
sent from HD 10/23 due to hypotension, weakness  found to have anemia, thrombocytopenia  #esrd on hd  mwf  hd today per maintenance hd schedule   #hyperkalemia  k stable now  #etiology of hypotension?    bp improving now  #anemia, multifactorial   myelofibrosis, known to dr yen whitman  monitor hb trend, lower today, heme/med managemnet for tx  getting micera as outpt; last 200mg 10/21  last hb 9.2 on 10.16  monitor hb trends

## 2024-10-28 NOTE — PROGRESS NOTE ADULT - SUBJECTIVE AND OBJECTIVE BOX
***************************************************************  Ramiromeche Pablo, PGY-1  on TEAMS  ***************************************************************    MIHYEON HU ([id number])- Patient is a 64y old  Female who presents with a chief complaint of fever, lethargy, hypotension (26 Oct 2024 06:49)      INTERVAL/OVERNIGHT EVENTS/SUBJECTIVE:   No acute events overnight.     Pt seen at bedside; denies n/v, sob, chest pain.     PHYSICAL EXAM:  GENERAL: NAD, well-groomed, well-developed  HEAD:  NCAT  EYES: EOMI, PERRL, mild scleral icterus  ENMT: No tonsillar erythema, exudates, or enlargement; Moist mucous membranes  NECK: Supple, non tender, No JVD, Normal thyroid  HEART: Regular rate and rhythm; 2/6 holosystolic murmur present. No rubs, or gallops. S1/S2 present  RESPIRATORY: CTA B/L, No W/R/R  ABDOMEN: Soft, Nontender, Nondistended; Bowel sounds present. Splenomegaly present  NEUROLOGY: A&Ox3, nonfocal, moving all extremities,  EXTREMITIES:  2+ Peripheral Pulses, No clubbing, cyanosis, or edema. 5/5 muscle tone in UE/LE. Large fistula present on LUE.   SKIN: warm, dry, normal color, no rash or abnormal lesions      Vital Signs Last 24 Hrs  T(C): 36.4 (28 Oct 2024 06:00), Max: 36.9 (27 Oct 2024 18:46)  T(F): 97.6 (28 Oct 2024 06:00), Max: 98.5 (27 Oct 2024 18:46)  HR: 73 (28 Oct 2024 06:00) (73 - 80)  BP: 116/68 (28 Oct 2024 06:00) (116/68 - 118/71)  BP(mean): --  RR: 18 (28 Oct 2024 06:00) (16 - 18)  SpO2: 98% (28 Oct 2024 06:00) (98% - 99%)    Parameters below as of 28 Oct 2024 06:00  Patient On (Oxygen Delivery Method): room air        LABS (available at time of writing 10-28-24 @ 07:09):                         8.1    1.32  )-----------( 57       ( 27 Oct 2024 06:44 )             24.9     10-27    138  |  102  |  22  ----------------------------<  92  3.8   |  25  |  5.47[H]    Ca    8.6      27 Oct 2024 06:44  Phos  3.4     10-27  Mg     2.0     10-27    TPro  6.0  /  Alb  3.6  /  TBili  2.9[H]  /  DBili  x   /  AST  12  /  ALT  8[L]  /  AlkPhos  101  10-27      Lactate Trend    PT/INR - ( 26 Oct 2024 07:17 )   PT: 14.2 sec;   INR: 1.24 ratio           Urinalysis Basic - ( 27 Oct 2024 06:44 )    Color: x / Appearance: x / SG: x / pH: x  Gluc: 92 mg/dL / Ketone: x  / Bili: x / Urobili: x   Blood: x / Protein: x / Nitrite: x   Leuk Esterase: x / RBC: x / WBC x   Sq Epi: x / Non Sq Epi: x / Bacteria: x          CAPILLARY BLOOD GLUCOSE          I&O's Summary    27 Oct 2024 07:01  -  28 Oct 2024 07:00  --------------------------------------------------------  IN: 1020 mL / OUT: 0 mL / NET: 1020 mL        Medications:  MEDICATIONS  (STANDING):  chlorhexidine 2% Cloths 1 Application(s) Topical daily  hydroxyurea 500 milliGRAM(s) Oral daily  Inrebic 100 mg 2 Capsule(s) 2 Capsule(s) Oral <User Schedule>  Nephro-deonna 1 Tablet(s) Oral daily  pantoprazole    Tablet 40 milliGRAM(s) Oral two times a day  piperacillin/tazobactam IVPB.. 3.375 Gram(s) IV Intermittent every 12 hours  ursodiol Tablet 500 milliGRAM(s) Oral two times a day    MEDICATIONS  (PRN):  sodium chloride 0.9% Bolus. 100 milliLiter(s) IV Bolus every 5 minutes PRN SBP LESS THAN or EQUAL to 80 mmHg      RADIOLOGY & ADDITIONAL TESTS were viewed by me personally.     EKG: reviewed    IMAGING: personally reviewed

## 2024-10-28 NOTE — PHYSICAL THERAPY INITIAL EVALUATION ADULT - PERTINENT HX OF CURRENT PROBLEM, REHAB EVAL
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

## 2024-10-28 NOTE — PROGRESS NOTE ADULT - SUBJECTIVE AND OBJECTIVE BOX
[facility] DIVISION OF KIDNEY DISEASES AND HYPERTENSION -- FOLLOW UP NOTE  --------------------------------------------------------------------------------  Chief Complaint:/subjective:  seen on hd  tolerating well  c/o left hand swelling but arm ok and avf ok during hd thus far    24 hour events:no acute events         PAST HISTORY  --------------------------------------------------------------------------------  No significant changes to PMH, PSH, FHx, SHx, unless otherwise noted    ALLERGIES & MEDICATIONS  --------------------------------------------------------------------------------  Allergies    No Known Allergies    Intolerances      Standing Inpatient Medications  chlorhexidine 2% Cloths 1 Application(s) Topical daily  chlorhexidine 4% Liquid 1 Application(s) Topical once  hydroxyurea 500 milliGRAM(s) Oral daily  Inrebic 100 mg 2 Capsule(s) 2 Capsule(s) Oral <User Schedule>  Nephro-deonna 1 Tablet(s) Oral daily  pantoprazole    Tablet 40 milliGRAM(s) Oral two times a day  piperacillin/tazobactam IVPB.. 3.375 Gram(s) IV Intermittent every 12 hours  ursodiol Tablet 500 milliGRAM(s) Oral two times a day    PRN Inpatient Medications  sodium chloride 0.9% Bolus. 100 milliLiter(s) IV Bolus every 5 minutes PRN      REVIEW OF SYSTEMS  --------------------------------------------------------------------------------  Gen: No weight changes, fatigue, fevers/chills, weakness  Skin: No rashes  Head/Eyes/Ears/Mouth: No headache;   Respiratory: No dyspnea, cough  CV: No chest pain, PND, orthopnea  GI: No abdominal pain, diarrhea, constipation, nausea, vomiting  : No increased frequency, dysuria, hematuria, nocturia  MSK: No joint pain/swelling; no back pain; no edema  Neuro: No dizziness/lightheadedness, weakness  Heme: No easy bruising or bleeding  Psych: No significant nervousness, anxiety, stress, depression    All other systems were reviewed and are negative, except as noted.    VITALS/PHYSICAL EXAM  --------------------------------------------------------------------------------  T(C): 36.3 (10-28-24 @ 08:25), Max: 36.9 (10-27-24 @ 18:46)  HR: 69 (10-28-24 @ 08:25) (69 - 80)  BP: 130/67 (10-28-24 @ 08:25) (116/68 - 130/67)  RR: 16 (10-28-24 @ 08:25) (16 - 18)  SpO2: 97% (10-28-24 @ 08:25) (97% - 100%)  Wt(kg): --  Adult Advanced Hemodynamics Last 24 Hrs  ABP: --  ABP(mean): --  CVP(mm Hg): --  CO: --  CI: --  PA: --  PA(mean): --  PCWP: --  SVR: --  SVRI: --        10-27-24 @ 07:01  -  10-28-24 @ 07:00  --------------------------------------------------------  IN: 1020 mL / OUT: 0 mL / NET: 1020 mL      Physical Exam:  	Gen: NAD,   	HEENT:  no jvp  	Pulm: CTA B/L  	CV: RRR, S1S2; no rub  	Back:   no sacral edema  	Abd: +BS, soft,   	: No suprapubic tenderness  	Ext: no edema  	Neuro: awake  	Psych: alert  	Skin: Warm  	Vascular access: left avf +b+T    LABS/STUDIES  --------------------------------------------------------------------------------              8.4    1.62  >-----------<  56       [10-28-24 @ 09:42]              25.6     Hemoglobin: 8.4 g/dL (10-28-24 @ 09:42)  Hemoglobin: 8.1 g/dL (10-27-24 @ 06:44)    Platelet Count - Automated: 56 K/uL (10-28-24 @ 09:42)  Platelet Count - Automated: 57 K/uL (10-27-24 @ 06:44)    138  |  103  |  35  ----------------------------<  101      [10-28-24 @ 09:42]  3.8   |  20  |  7.07        Ca     8.6     [10-28-24 @ 09:42]      Mg     2.0     [10-28-24 @ 09:42]      Phos  3.8     [10-28-24 @ 09:42]    TPro  6.0  /  Alb  3.6  /  TBili  1.9  /  DBili  x   /  AST  11  /  ALT  10  /  AlkPhos  109  [10-28-24 @ 09:42]          Creatinine Trend:  SCr 7.07 [10-28 @ 09:42]  SCr 5.47 [10-27 @ 06:44]  SCr 4.07 [10-26 @ 07:17]  SCr 4.95 [10-25 @ 07:09]  SCr 7.15 [10-24 @ 07:26]    Urinalysis - [10-28-24 @ 09:42]      Color  / Appearance  / SG  / pH       Gluc 101 / Ketone   / Bili  / Urobili        Blood  / Protein  / Leuk Est  / Nitrite       RBC  / WBC  / Hyaline  / Gran  / Sq Epi  / Non Sq Epi  / Bacteria       Iron 48, TIBC 116, %sat 42      [10-24-24 @ 07:26]  Ferritin 633      [10-24-24 @ 07:28]  PTH -- (Ca 8.1)      [10-24-24 @ 07:28]   225  PTH -- (Ca 8.0)      [12-20-23 @ 07:21]   135  PTH -- (Ca 8.9)      [11-28-23 @ 06:56]   89  TSH 1.02      [10-24-24 @ 07:28]    HBsAb Reactive      [10-24-24 @ 07:28]  HBsAg Nonreact      [10-23-24 @ 20:48]  HCV 0.06, Nonreact      [10-24-24 @ 07:28]

## 2024-10-28 NOTE — PROGRESS NOTE ADULT - ATTENDING COMMENTS
# Clinical sepsis w/ hypotension, recent vascular procedure  # Acute on chronic anemia 2/2 possible UGIB i/s/o gastric varices  # Pancytopenia  # Polycythemia vera w/ secondary myelofibrosis  # noncirrhotic portal HTN  # neutropenic fever  # ESRD on HD    - can stop zosyn  - US RUQ with dilated CBD. MRCP also demonstrates mildly dilated CBD without any stone, splenomegaly, and evidence of iron deposition in liver  - GI pcr + rotavirus. Diarrhea now resolved  - s/p 1u prbc 10/24, 1u prbc 10/25. ppi bid, cbc q12h. transfuse to keep hgb >7, PLT >10 or >20 if febrile  - no evidence of acute bleed. labs consistent with hemolysis but no schistocytes, smear negative per hem onc  - appreciate hem onc recs, on Inrebic , hydroxyurea   - holding coreg due to hypotension .    DC once confirmed outpatient HD

## 2024-10-28 NOTE — PROGRESS NOTE ADULT - PROBLEM SELECTOR PLAN 1
Meet SIRS criteria, with fever, leukopenia and tachycardia, c/f bacteremia post vascular procedure w/ stent placement. S/p vanc/zosyn in ED x 1  BCx/UCx negative as 10/28    - c/w zosyn empirically   - MRSA pcr  - GI pcr  - UA/RVP/CXR neg  -Rotavirus + 10/25-> fluid resuscitation and precautions  -CBD dilation on imaging, MRCP for 10/27, notified renal of HD post MRCP on 10/28

## 2024-10-29 ENCOUNTER — TRANSCRIPTION ENCOUNTER (OUTPATIENT)
Age: 64
End: 2024-10-29

## 2024-10-29 VITALS
TEMPERATURE: 99 F | HEART RATE: 72 BPM | DIASTOLIC BLOOD PRESSURE: 71 MMHG | SYSTOLIC BLOOD PRESSURE: 118 MMHG | OXYGEN SATURATION: 100 % | RESPIRATION RATE: 18 BRPM

## 2024-10-29 PROBLEM — Z87.19 PERSONAL HISTORY OF OTHER DISEASES OF THE DIGESTIVE SYSTEM: Chronic | Status: ACTIVE | Noted: 2024-10-24

## 2024-10-29 LAB
ALBUMIN SERPL ELPH-MCNC: 3.9 G/DL — SIGNIFICANT CHANGE UP (ref 3.3–5)
ALP SERPL-CCNC: 106 U/L — SIGNIFICANT CHANGE UP (ref 40–120)
ALT FLD-CCNC: 10 U/L — SIGNIFICANT CHANGE UP (ref 10–45)
ANION GAP SERPL CALC-SCNC: 14 MMOL/L — SIGNIFICANT CHANGE UP (ref 5–17)
AST SERPL-CCNC: 16 U/L — SIGNIFICANT CHANGE UP (ref 10–40)
BASOPHILS # BLD AUTO: 0.01 K/UL — SIGNIFICANT CHANGE UP (ref 0–0.2)
BASOPHILS NFR BLD AUTO: 0.5 % — SIGNIFICANT CHANGE UP (ref 0–2)
BILIRUB SERPL-MCNC: 1.6 MG/DL — HIGH (ref 0.2–1.2)
BUN SERPL-MCNC: 19 MG/DL — SIGNIFICANT CHANGE UP (ref 7–23)
CALCIUM SERPL-MCNC: 9 MG/DL — SIGNIFICANT CHANGE UP (ref 8.4–10.5)
CHLORIDE SERPL-SCNC: 102 MMOL/L — SIGNIFICANT CHANGE UP (ref 96–108)
CO2 SERPL-SCNC: 24 MMOL/L — SIGNIFICANT CHANGE UP (ref 22–31)
CREAT SERPL-MCNC: 4.71 MG/DL — HIGH (ref 0.5–1.3)
CULTURE RESULTS: SIGNIFICANT CHANGE UP
CULTURE RESULTS: SIGNIFICANT CHANGE UP
EGFR: 10 ML/MIN/1.73M2 — LOW
EOSINOPHIL # BLD AUTO: 0.01 K/UL — SIGNIFICANT CHANGE UP (ref 0–0.5)
EOSINOPHIL NFR BLD AUTO: 0.5 % — SIGNIFICANT CHANGE UP (ref 0–6)
GLUCOSE SERPL-MCNC: 71 MG/DL — SIGNIFICANT CHANGE UP (ref 70–99)
HCT VFR BLD CALC: 28.8 % — LOW (ref 34.5–45)
HGB BLD-MCNC: 9.4 G/DL — LOW (ref 11.5–15.5)
IMM GRANULOCYTES NFR BLD AUTO: 0.5 % — SIGNIFICANT CHANGE UP (ref 0–0.9)
LYMPHOCYTES # BLD AUTO: 0.85 K/UL — LOW (ref 1–3.3)
LYMPHOCYTES # BLD AUTO: 42.1 % — SIGNIFICANT CHANGE UP (ref 13–44)
MAGNESIUM SERPL-MCNC: 1.8 MG/DL — SIGNIFICANT CHANGE UP (ref 1.6–2.6)
MCHC RBC-ENTMCNC: 32.6 GM/DL — SIGNIFICANT CHANGE UP (ref 32–36)
MCHC RBC-ENTMCNC: 35.9 PG — HIGH (ref 27–34)
MCV RBC AUTO: 109.9 FL — HIGH (ref 80–100)
MONOCYTES # BLD AUTO: 0.13 K/UL — SIGNIFICANT CHANGE UP (ref 0–0.9)
MONOCYTES NFR BLD AUTO: 6.4 % — SIGNIFICANT CHANGE UP (ref 2–14)
NEUTROPHILS # BLD AUTO: 1.01 K/UL — LOW (ref 1.8–7.4)
NEUTROPHILS NFR BLD AUTO: 50 % — SIGNIFICANT CHANGE UP (ref 43–77)
NRBC # BLD: 0 /100 WBCS — SIGNIFICANT CHANGE UP (ref 0–0)
PHOSPHATE SERPL-MCNC: 3.8 MG/DL — SIGNIFICANT CHANGE UP (ref 2.5–4.5)
PLATELET # BLD AUTO: 67 K/UL — LOW (ref 150–400)
POTASSIUM SERPL-MCNC: 3.9 MMOL/L — SIGNIFICANT CHANGE UP (ref 3.5–5.3)
POTASSIUM SERPL-SCNC: 3.9 MMOL/L — SIGNIFICANT CHANGE UP (ref 3.5–5.3)
PROT SERPL-MCNC: 6.5 G/DL — SIGNIFICANT CHANGE UP (ref 6–8.3)
RBC # BLD: 2.62 M/UL — LOW (ref 3.8–5.2)
RBC # FLD: 23.6 % — HIGH (ref 10.3–14.5)
SODIUM SERPL-SCNC: 140 MMOL/L — SIGNIFICANT CHANGE UP (ref 135–145)
SPECIMEN SOURCE: SIGNIFICANT CHANGE UP
SPECIMEN SOURCE: SIGNIFICANT CHANGE UP
WBC # BLD: 2.02 K/UL — LOW (ref 3.8–10.5)
WBC # FLD AUTO: 2.02 K/UL — LOW (ref 3.8–10.5)

## 2024-10-29 PROCEDURE — 99239 HOSP IP/OBS DSCHRG MGMT >30: CPT

## 2024-10-29 PROCEDURE — 87086 URINE CULTURE/COLONY COUNT: CPT

## 2024-10-29 PROCEDURE — 83010 ASSAY OF HAPTOGLOBIN QUANT: CPT

## 2024-10-29 PROCEDURE — 76705 ECHO EXAM OF ABDOMEN: CPT

## 2024-10-29 PROCEDURE — 82435 ASSAY OF BLOOD CHLORIDE: CPT

## 2024-10-29 PROCEDURE — 86803 HEPATITIS C AB TEST: CPT

## 2024-10-29 PROCEDURE — 85014 HEMATOCRIT: CPT

## 2024-10-29 PROCEDURE — 84295 ASSAY OF SERUM SODIUM: CPT

## 2024-10-29 PROCEDURE — 87637 SARSCOV2&INF A&B&RSV AMP PRB: CPT

## 2024-10-29 PROCEDURE — 99261: CPT

## 2024-10-29 PROCEDURE — A9585: CPT

## 2024-10-29 PROCEDURE — 84466 ASSAY OF TRANSFERRIN: CPT

## 2024-10-29 PROCEDURE — 74177 CT ABD & PELVIS W/CONTRAST: CPT | Mod: MC

## 2024-10-29 PROCEDURE — 71045 X-RAY EXAM CHEST 1 VIEW: CPT

## 2024-10-29 PROCEDURE — 82607 VITAMIN B-12: CPT

## 2024-10-29 PROCEDURE — 99233 SBSQ HOSP IP/OBS HIGH 50: CPT

## 2024-10-29 PROCEDURE — 82310 ASSAY OF CALCIUM: CPT

## 2024-10-29 PROCEDURE — 81001 URINALYSIS AUTO W/SCOPE: CPT

## 2024-10-29 PROCEDURE — 99285 EMERGENCY DEPT VISIT HI MDM: CPT

## 2024-10-29 PROCEDURE — 74183 MRI ABD W/O CNTR FLWD CNTR: CPT | Mod: MC

## 2024-10-29 PROCEDURE — P9040: CPT

## 2024-10-29 PROCEDURE — 82728 ASSAY OF FERRITIN: CPT

## 2024-10-29 PROCEDURE — 87070 CULTURE OTHR SPECIMN AEROBIC: CPT

## 2024-10-29 PROCEDURE — 85610 PROTHROMBIN TIME: CPT

## 2024-10-29 PROCEDURE — 86880 COOMBS TEST DIRECT: CPT

## 2024-10-29 PROCEDURE — 85018 HEMOGLOBIN: CPT

## 2024-10-29 PROCEDURE — 85730 THROMBOPLASTIN TIME PARTIAL: CPT

## 2024-10-29 PROCEDURE — 85027 COMPLETE CBC AUTOMATED: CPT

## 2024-10-29 PROCEDURE — 85045 AUTOMATED RETICULOCYTE COUNT: CPT

## 2024-10-29 PROCEDURE — 86706 HEP B SURFACE ANTIBODY: CPT

## 2024-10-29 PROCEDURE — 83540 ASSAY OF IRON: CPT

## 2024-10-29 PROCEDURE — 87040 BLOOD CULTURE FOR BACTERIA: CPT

## 2024-10-29 PROCEDURE — 82248 BILIRUBIN DIRECT: CPT

## 2024-10-29 PROCEDURE — 83615 LACTATE (LD) (LDH) ENZYME: CPT

## 2024-10-29 PROCEDURE — 87507 IADNA-DNA/RNA PROBE TQ 12-25: CPT

## 2024-10-29 PROCEDURE — 86902 BLOOD TYPE ANTIGEN DONOR EA: CPT

## 2024-10-29 PROCEDURE — 83970 ASSAY OF PARATHORMONE: CPT

## 2024-10-29 PROCEDURE — 36430 TRANSFUSION BLD/BLD COMPNT: CPT

## 2024-10-29 PROCEDURE — 82746 ASSAY OF FOLIC ACID SERUM: CPT

## 2024-10-29 PROCEDURE — 82330 ASSAY OF CALCIUM: CPT

## 2024-10-29 PROCEDURE — 80053 COMPREHEN METABOLIC PANEL: CPT

## 2024-10-29 PROCEDURE — 84132 ASSAY OF SERUM POTASSIUM: CPT

## 2024-10-29 PROCEDURE — 96374 THER/PROPH/DIAG INJ IV PUSH: CPT

## 2024-10-29 PROCEDURE — 83690 ASSAY OF LIPASE: CPT

## 2024-10-29 PROCEDURE — 84100 ASSAY OF PHOSPHORUS: CPT

## 2024-10-29 PROCEDURE — 82803 BLOOD GASES ANY COMBINATION: CPT

## 2024-10-29 PROCEDURE — 83605 ASSAY OF LACTIC ACID: CPT

## 2024-10-29 PROCEDURE — 84443 ASSAY THYROID STIM HORMONE: CPT

## 2024-10-29 PROCEDURE — 83550 IRON BINDING TEST: CPT

## 2024-10-29 PROCEDURE — 86900 BLOOD TYPING SEROLOGIC ABO: CPT

## 2024-10-29 PROCEDURE — 85025 COMPLETE CBC W/AUTO DIFF WBC: CPT

## 2024-10-29 PROCEDURE — 87340 HEPATITIS B SURFACE AG IA: CPT

## 2024-10-29 PROCEDURE — 86850 RBC ANTIBODY SCREEN: CPT

## 2024-10-29 PROCEDURE — 83735 ASSAY OF MAGNESIUM: CPT

## 2024-10-29 PROCEDURE — 86901 BLOOD TYPING SEROLOGIC RH(D): CPT

## 2024-10-29 PROCEDURE — 86922 COMPATIBILITY TEST ANTIGLOB: CPT

## 2024-10-29 PROCEDURE — 82947 ASSAY GLUCOSE BLOOD QUANT: CPT

## 2024-10-29 PROCEDURE — 96375 TX/PRO/DX INJ NEW DRUG ADDON: CPT

## 2024-10-29 RX ORDER — HEPARIN SODIUM,PORCINE/PF 10 UNIT/ML
300 SYRINGE (ML) INTRAVENOUS ONCE
Refills: 0 | Status: DISCONTINUED | OUTPATIENT
Start: 2024-10-29 | End: 2024-10-29

## 2024-10-29 RX ADMIN — URSODIOL 500 MILLIGRAM(S): 500 TABLET, FILM COATED ORAL at 06:26

## 2024-10-29 RX ADMIN — Medication 500 MILLIGRAM(S): at 13:32

## 2024-10-29 RX ADMIN — Medication 1 TABLET(S): at 13:32

## 2024-10-29 RX ADMIN — PANTOPRAZOLE SODIUM 40 MILLIGRAM(S): 40 TABLET, DELAYED RELEASE ORAL at 06:26

## 2024-10-29 NOTE — PROGRESS NOTE ADULT - SUBJECTIVE AND OBJECTIVE BOX
[facility] DIVISION OF KIDNEY DISEASES AND HYPERTENSION -- FOLLOW UP NOTE  --------------------------------------------------------------------------------  Chief Complaint:/subjective:  no complaints    24 hour events:  s/p hd yesterday      PAST HISTORY  --------------------------------------------------------------------------------  No significant changes to PMH, PSH, FHx, SHx, unless otherwise noted    ALLERGIES & MEDICATIONS  --------------------------------------------------------------------------------  Allergies    No Known Allergies    Intolerances      Standing Inpatient Medications  chlorhexidine 2% Cloths 1 Application(s) Topical daily  hydroxyurea 500 milliGRAM(s) Oral daily  Inrebic 100 mg 2 Capsule(s) 2 Capsule(s) Oral <User Schedule>  Nephro-deonna 1 Tablet(s) Oral daily  pantoprazole    Tablet 40 milliGRAM(s) Oral two times a day  ursodiol Tablet 500 milliGRAM(s) Oral two times a day    PRN Inpatient Medications  sodium chloride 0.9% Bolus. 100 milliLiter(s) IV Bolus every 5 minutes PRN      REVIEW OF SYSTEMS  --------------------------------------------------------------------------------  Gen: No weight changes, fatigue, fevers/chills, weakness  Skin: No rashes  Head/Eyes/Ears/Mouth: No headache;   Respiratory: No dyspnea, cough  CV: No chest pain, PND, orthopnea  GI: No abdominal pain, diarrhea, constipation, nausea, vomiting  : No increased frequency, dysuria, hematuria, nocturia  MSK: No joint pain/swelling; no back pain; no edema  Neuro: No dizziness/lightheadedness, weakness  Heme: No easy bruising or bleeding  Psych: No significant nervousness, anxiety, stress, depression    All other systems were reviewed and are negative, except as noted.    VITALS/PHYSICAL EXAM  --------------------------------------------------------------------------------  T(C): 36.7 (10-29-24 @ 06:00), Max: 36.8 (10-28-24 @ 17:45)  HR: 72 (10-29-24 @ 06:00) (69 - 76)  BP: 114/71 (10-29-24 @ 06:00) (114/71 - 133/79)  RR: 18 (10-29-24 @ 06:00) (17 - 18)  SpO2: 100% (10-29-24 @ 06:00) (100% - 100%)  Wt(kg): --  Adult Advanced Hemodynamics Last 24 Hrs  ABP: --  ABP(mean): --  CVP(mm Hg): --  CO: --  CI: --  PA: --  PA(mean): --  PCWP: --  SVR: --  SVRI: --        10-28-24 @ 07:01  -  10-29-24 @ 07:00  --------------------------------------------------------  IN: 300 mL / OUT: 2000 mL / NET: -1700 mL      Physical Exam:  	Gen: NAD,   	HEENT:  no jvp  	Pulm: CTA B/L  	CV: RRR, S1S2; no rub  	Back:   no sacral edema  	Abd: +BS, soft,   	: No suprapubic tenderness  	Ext: no edema  	Neuro: awake  	Psych: alert  	Skin: Warm  	Vascular access: left avf +b+T      LABS/STUDIES  --------------------------------------------------------------------------------              9.4    2.02  >-----------<  67       [10-29-24 @ 07:14]              28.8     Hemoglobin: 9.4 g/dL (10-29-24 @ 07:14)  Hemoglobin: 8.4 g/dL (10-28-24 @ 09:42)    Platelet Count - Automated: 67 K/uL (10-29-24 @ 07:14)  Platelet Count - Automated: 56 K/uL (10-28-24 @ 09:42)    140  |  102  |  19  ----------------------------<  71      [10-29-24 @ 07:11]  3.9   |  24  |  4.71        Ca     9.0     [10-29-24 @ 07:11]      Mg     1.8     [10-29-24 @ 07:11]      Phos  3.8     [10-29-24 @ 07:11]    TPro  6.5  /  Alb  3.9  /  TBili  1.6  /  DBili  x   /  AST  16  /  ALT  10  /  AlkPhos  106  [10-29-24 @ 07:11]          Creatinine Trend:  SCr 4.71 [10-29 @ 07:11]  SCr 7.07 [10-28 @ 09:42]  SCr 5.47 [10-27 @ 06:44]  SCr 4.07 [10-26 @ 07:17]  SCr 4.95 [10-25 @ 07:09]    Urinalysis - [10-29-24 @ 07:11]      Color  / Appearance  / SG  / pH       Gluc 71 / Ketone   / Bili  / Urobili        Blood  / Protein  / Leuk Est  / Nitrite       RBC  / WBC  / Hyaline  / Gran  / Sq Epi  / Non Sq Epi  / Bacteria       Iron 48, TIBC 116, %sat 42      [10-24-24 @ 07:26]  Ferritin 633      [10-24-24 @ 07:28]  PTH -- (Ca 8.1)      [10-24-24 @ 07:28]   225  PTH -- (Ca 8.0)      [12-20-23 @ 07:21]   135  PTH -- (Ca 8.9)      [11-28-23 @ 06:56]   89  TSH 1.02      [10-24-24 @ 07:28]    HBsAb Reactive      [10-24-24 @ 07:28]  HBsAg Nonreact      [10-23-24 @ 20:48]  HCV 0.06, Nonreact      [10-24-24 @ 07:28]

## 2024-10-29 NOTE — PROGRESS NOTE ADULT - REASON FOR ADMISSION
fever, lethargy, hypotension

## 2024-10-29 NOTE — PROGRESS NOTE ADULT - PROBLEM SELECTOR PLAN 6
Hx of non-cirrhotic portal HTN with gastric varices. Possible source of reported melena   - coreg held for hypotension  - anemia treatment as above

## 2024-10-29 NOTE — DISCHARGE NOTE PROVIDER - CARE PROVIDER_API CALL
[facility]  [address] [place] [id number]  Phone: [phone]  Fax: [phone]  Established Patient  Follow Up Time:

## 2024-10-29 NOTE — DISCHARGE NOTE NURSING/CASE MANAGEMENT/SOCIAL WORK - FINANCIAL ASSISTANCE
[facility] provides services at a reduced cost to those who are determined to be eligible through [facility]’s financial assistance program. Information regarding [facility]’s financial assistance program can be found by going to [email or web address] or by calling [phone].

## 2024-10-29 NOTE — PROGRESS NOTE ADULT - PROBLEM SELECTOR PROBLEM 1
Clinical sepsis
ESRD on hemodialysis
Clinical sepsis
ESRD on hemodialysis
ESRD on hemodialysis
Clinical sepsis

## 2024-10-29 NOTE — PROGRESS NOTE ADULT - PROBLEM SELECTOR PLAN 8
Diet: renal  DVT: SCDs  Dispo: pending medical optimization

## 2024-10-29 NOTE — PROGRESS NOTE ADULT - ATTENDING COMMENTS
sent from HD 10/23 due to hypotension, weakness  found to have anemia, thrombocytopenia  +norovirus  #esrd on hd  mwf  hd done 10/28 per maintenance hd schedule   next hd 10/30  no urgent indication for hd today  #hyperkalemia  k stable now  #etiology of hypotension?    bp improving now  ?volume depletion  #anemia, multifactorial   myelofibrosis, known to dr yen whitman  monitor hb trend, lower today, heme/med managemnet for tx  getting micera as outpt; last 200mg 10/21  last hb 9.2 on 10.16  monitor hb trends  #outpt hd jennifer joyce

## 2024-10-29 NOTE — PROGRESS NOTE ADULT - PROVIDER SPECIALTY LIST ADULT
Internal Medicine
Heme/Onc
Heme/Onc
Nephrology
Internal Medicine
Nephrology
Nephrology
Internal Medicine

## 2024-10-29 NOTE — PROGRESS NOTE ADULT - SUBJECTIVE AND OBJECTIVE BOX
INTERVAL HPI/OVERNIGHT EVENTS:  Patient S&E at bedside. No o/n events, patient resting comfortably. Reports overall feeling better, denies any overt bleeding.    [place]  ID: [id number]    VITAL SIGNS:  T(F): 98.1 (10-29-24 @ 06:00)  HR: 72 (10-29-24 @ 06:00)  BP: 114/71 (10-29-24 @ 06:00)  RR: 18 (10-29-24 @ 06:00)  SpO2: 100% (10-29-24 @ 06:00)  Wt(kg): --    PHYSICAL EXAM:    Constitutional: NAD  Eyes: EOMI, sclera non-icteric  Neck: supple, no masses, no JVD  Respiratory: CTA b/l, good air entry b/l  Cardiovascular: RRR, no M/R/G  Gastrointestinal: soft, NTND, no masses palpable, + BS  Extremities: no c/c/e, AVF clean no erythema/warmth  Neurological: AAOx3      MEDICATIONS  (STANDING):  chlorhexidine 2% Cloths 1 Application(s) Topical daily  hydroxyurea 500 milliGRAM(s) Oral daily  Inrebic 100 mg 2 Capsule(s) 2 Capsule(s) Oral <User Schedule>  Nephro-deonna 1 Tablet(s) Oral daily  pantoprazole    Tablet 40 milliGRAM(s) Oral two times a day  ursodiol Tablet 500 milliGRAM(s) Oral two times a day    MEDICATIONS  (PRN):  sodium chloride 0.9% Bolus. 100 milliLiter(s) IV Bolus every 5 minutes PRN SBP LESS THAN or EQUAL to 80 mmHg      Allergies    No Known Allergies    Intolerances        LABS:                        9.4    2.02  )-----------( 67       ( 29 Oct 2024 07:14 )             28.8     10-29    140  |  102  |  19  ----------------------------<  71  3.9   |  24  |  4.71[H]    Ca    9.0      29 Oct 2024 07:11  Phos  3.8     10-29  Mg     1.8     10-29    TPro  6.5  /  Alb  3.9  /  TBili  1.6[H]  /  DBili  x   /  AST  16  /  ALT  10  /  AlkPhos  106  10-29      Urinalysis Basic - ( 29 Oct 2024 07:11 )    Color: x / Appearance: x / SG: x / pH: x  Gluc: 71 mg/dL / Ketone: x  / Bili: x / Urobili: x   Blood: x / Protein: x / Nitrite: x   Leuk Esterase: x / RBC: x / WBC x   Sq Epi: x / Non Sq Epi: x / Bacteria: x        RADIOLOGY & ADDITIONAL TESTS:  Studies reviewed.    ASSESSMENT & PLAN:

## 2024-10-29 NOTE — DISCHARGE NOTE PROVIDER - NSDCMRMEDTOKEN_GEN_ALL_CORE_FT
hydroxyurea 500 mg oral capsule: 1 cap(s) orally once a day  Inrebic 100 mg oral capsule: 2 cap(s) orally once a day  Mircera 200 mcg/0.3 mL injectable solution: 0.3 milliliter(s) injectable every 3 weeks  Omeprazole: 20 milligram(s) once a day  ondansetron 4 mg oral tablet: 1 tab(s) orally prn as needed for  nausea  Carmen-Ben Rx oral tablet: 1 tab(s) orally once a day  ursodiol 500 mg oral tablet: 1 tab(s) orally once a day

## 2024-10-29 NOTE — DISCHARGE NOTE NURSING/CASE MANAGEMENT/SOCIAL WORK - PATIENT PORTAL LINK FT
You can access the FollowMyHealth Patient Portal offered by [facility] by registering at the following website: [email or web address]. By joining [facility]’s FollowMyHealth portal, you will also be able to view your health information using other applications (apps) compatible with our system.

## 2024-10-29 NOTE — PROGRESS NOTE ADULT - PROBLEM SELECTOR PLAN 2
Acute on chronic anemia with hgb 7, below baseline of around 9 with reported melena, concerning for UGIB in setting of gastric varices. Last melanic stool reportedly 2 days ago, doubt continued bleed  - s/p 1u prbc in ED, with inappropriately low hgb trend, s/p 1u pRBC on 10/25  - monitor stool output  - CBC q12  - maintain active T&S  - PPI BID  - if pt develops sign of UGIB, start octreotide gtt, consult GI

## 2024-10-29 NOTE — PROGRESS NOTE ADULT - PROBLEM SELECTOR PLAN 4
Problem: Skin Injury Risk Increased  Goal: Skin Health and Integrity  Intervention: Promote and Optimize Oral Intake  Flowsheets (Taken 9/18/2023 1537)  Oral Nutrition Promotion: calorie-dense liquids provided. Diet advanced to diet to dysphagia soft, 50% per nursing; continue ensure with meals.     Problem: Oral Intake Inadequate  Goal: Improved Oral Intake  Outcome: Ongoing, Progressing  Intervention: Promote and Optimize Oral Intake  Flowsheets (Taken 9/18/2023 1537)  Oral Nutrition Promotion: calorie-dense liquids provided     
K in ED 5.6, likely elevated d/t missed HD yesterday    - monitor K
K in ED 5.6, likely elevated d/t missed HD yesterday  - lokelma 10g BID  - monitor K

## 2024-10-29 NOTE — PROGRESS NOTE ADULT - SUBJECTIVE AND OBJECTIVE BOX
Vital Signs Last 24 Hrs  T(C): 36.7 (29 Oct 2024 06:00), Max: 36.8 (28 Oct 2024 17:45)  T(F): 98.1 (29 Oct 2024 06:00), Max: 98.3 (28 Oct 2024 17:45)  HR: 72 (29 Oct 2024 06:00) (69 - 78)  BP: 114/71 (29 Oct 2024 06:00) (114/71 - 133/79)  BP(mean): --  RR: 18 (29 Oct 2024 06:00) (16 - 18)  SpO2: 100% (29 Oct 2024 06:00) (97% - 100%)    Parameters below as of 29 Oct 2024 06:00  Patient On (Oxygen Delivery Method): room air        LABS (available at time of writing 10-29-24 @ 07:35):                         8.4    1.62  )-----------( 56       ( 28 Oct 2024 09:42 )             25.6     10-28    138  |  103  |  35[H]  ----------------------------<  101[H]  3.8   |  20[L]  |  7.07[H]    Ca    8.6      28 Oct 2024 09:42  Phos  3.8     10-28  Mg     2.0     10-28    TPro  6.0  /  Alb  3.6  /  TBili  1.9[H]  /  DBili  x   /  AST  11  /  ALT  10  /  AlkPhos  109  10-28      Lactate Trend      Urinalysis Basic - ( 28 Oct 2024 09:42 )    Color: x / Appearance: x / SG: x / pH: x  Gluc: 101 mg/dL / Ketone: x  / Bili: x / Urobili: x   Blood: x / Protein: x / Nitrite: x   Leuk Esterase: x / RBC: x / WBC x   Sq Epi: x / Non Sq Epi: x / Bacteria: x          CAPILLARY BLOOD GLUCOSE          I&O's Summary    28 Oct 2024 07:01  -  29 Oct 2024 07:00  --------------------------------------------------------  IN: 300 mL / OUT: 2000 mL / NET: -1700 mL        Medications:  MEDICATIONS  (STANDING):  chlorhexidine 2% Cloths 1 Application(s) Topical daily  hydroxyurea 500 milliGRAM(s) Oral daily  Inrebic 100 mg 2 Capsule(s) 2 Capsule(s) Oral <User Schedule>  Nephro-deonna 1 Tablet(s) Oral daily  pantoprazole    Tablet 40 milliGRAM(s) Oral two times a day  ursodiol Tablet 500 milliGRAM(s) Oral two times a day    MEDICATIONS  (PRN):  sodium chloride 0.9% Bolus. 100 milliLiter(s) IV Bolus every 5 minutes PRN SBP LESS THAN or EQUAL to 80 mmHg      RADIOLOGY & ADDITIONAL TESTS were viewed by me personally.     EKG: reviewed    IMAGING: personally reviewed      ***************************************************************  Juan Carlos Shah, PGY-1  on TEAMS  ***************************************************************    MIHYEON HU ([id number])- Patient is a 64y old  Female who presents with a chief complaint of fever, lethargy, hypotension (26 Oct 2024 06:49)      INTERVAL/OVERNIGHT EVENTS/SUBJECTIVE:   No acute events overnight.     Pt seen at bedside; denies n/v, sob, chest pain.     PHYSICAL EXAM:  GENERAL: NAD, well-groomed, well-developed  HEAD:  NCAT  EYES: EOMI, PERRL, mild scleral icterus  ENMT: No tonsillar erythema, exudates, or enlargement; Moist mucous membranes  NECK: Supple, non tender, No JVD, Normal thyroid  HEART: Regular rate and rhythm; 2/6 holosystolic murmur present. No rubs, or gallops. S1/S2 present  RESPIRATORY: CTA B/L, No W/R/R  ABDOMEN: Soft, Nontender, Nondistended; Bowel sounds present. Splenomegaly present  NEUROLOGY: A&Ox3, nonfocal, moving all extremities,  EXTREMITIES:  2+ Peripheral Pulses, No clubbing, cyanosis, or edema. 5/5 muscle tone in UE/LE. Large fistula present on LUE.   SKIN: warm, dry, normal color, no rash or abnormal lesions

## 2024-10-29 NOTE — DISCHARGE NOTE PROVIDER - NSDCCPCAREPLAN_GEN_ALL_CORE_FT
PRINCIPAL DISCHARGE DIAGNOSIS  Diagnosis: Fever, unknown origin  Assessment and Plan of Treatment: You were given empirical antibiotics for suspected sepsis, given your fever, leukopenia and tachycardia. Later you were found to have norovirus, which can explain your symptoms of nausea and diarrhea. Given your immunocompromised status due to myelofibrosis, a complete course of antibiotics was still given for bacterial infection precaution (No bacteria was found in your blood). Based on those findings, your episodes of low blood pressure were thought to be caused by dehydration due to diarrhea and reduced oral intake, rather than severe sepsis (e.g., infection from your AV fistula cleaning procedure prior to your admission). Supportive care was provided, and you also received HD while inpatient.  Please continue your HD and see nephrology for follow up.      SECONDARY DISCHARGE DIAGNOSES  Diagnosis: Pancytopenia  Assessment and Plan of Treatment: Your held your home myelofibrosis med Inrebic fews days prior to this hospitalization. Holding Inrebic with an extensive period (~48hour) may lead to rebouding. While you were here, per Heme/Onc suggestion, Inrebic restarted, and you pancytopenia improved. Please continue to take your home myelofibrosis med, and see Heme/Onc doctor for follow up.    Diagnosis: Imaging abnormality  Assessment and Plan of Treatment: You were found to have mild dilatation common duct without choledocholithiasis or other obstructing lesion. Please make sure to see GI doctor for follow up.

## 2024-10-29 NOTE — PROGRESS NOTE ADULT - ATTENDING COMMENTS
# Clinical sepsis w/ hypotension, recent vascular procedure  # Acute on chronic anemia 2/2 possible UGIB i/s/o gastric varices  # Pancytopenia  # Polycythemia vera w/ secondary myelofibrosis  # noncirrhotic portal HTN  # neutropenic fever  # ESRD on HD    - s/p 5-day course of zosyn  - US RUQ with dilated CBD. MRCP also demonstrates mildly dilated CBD without any stone, splenomegaly, and evidence of iron deposition in liver  - GI pcr + rotavirus. Diarrhea now resolved  - s/p 1u prbc 10/24, 1u prbc 10/25. ppi bid, cbc q12h. transfuse to keep hgb >7, PLT >10 or >20 if febrile  - no evidence of acute bleed. labs consistent with hemolysis but no schistocytes, smear negative per hem onc  - appreciate hem onc recs, on Inrebic , hydroxyurea   - holding coreg due to hypotension .    DC today once confirmed outpatient HD .

## 2024-10-29 NOTE — DISCHARGE NOTE PROVIDER - NSDCFUSCHEDAPPT_GEN_ALL_CORE_FT
Wily Wright  [facility]  ENDOVASCULAR [address]   Scheduled Appointment: 01/07/2025    [facility]  [facility]  Scheduled Appointment: 01/09/2025    Jacky Guo  [facility]  [facility]  Scheduled Appointment: 01/09/2025     Jacky Guo  [facility]  [facility]  Scheduled Appointment: 11/25/2024    Wily Wright  [facility]  ENDOVASCULAR [address]   Scheduled Appointment: 01/07/2025    [facility]  [facility]  Scheduled Appointment: 01/09/2025    Jacky Guo  [facility]  [facility]  Scheduled Appointment: 01/09/2025

## 2024-10-29 NOTE — DISCHARGE NOTE PROVIDER - HOSPITAL COURSE
HPI:  63 y/o F with PMH of ESRD on HD and myelofibrosis 2/2 polycythemia vera brought to ED from HD center due to hypotension and lethargy. Patient reports that she began feeling lethargic and had a temp of 99 on Tuesday evening following a fistulogram/vascular stent placement for her LUE fistula. Patient endorses 2 wk history of diarrhea, associated with black stool. Last episode of black stool 2 days ago. Also had some mild nausea and NBNB vomiting on Wednesday morning and had diarrhea that had a slight green appearance. Pt was also told to discontinue coreg about 2 wks ago due to low BPs. Patient also experienced some chest and back pain during her vomiting episodes which has since resolved. Patient then went to HD where she was found to be hypotensive with a systolic BP in the 80-90s, so they sent her over to the ED where she was found to have a fever. Patient denies any chest pain, SOB, headaches, dizziness. Patient reports she has had some intermittent diarrhea over the past 2 weeks, has not seen any blood in her stool, but she has had some black/green-tinged stools. Patient received her flu shot on Monday.     Hospital Course:  Pt was given empirical antibiotics for sepsis with fever, leukopenia and tachycardia. Pt was found to have norovirus, which is consistent with her symptoms of nausea and diarrhea. Given her immunocompromised status d/t myelofibrosis, a complete course of antibiotics was still given. Pt's episodes of hypotension were thought to be cause by dehydration d/t diarrhea and reduced PO intake, rather than severe sepsis (e.g., infection from her AV Fistula cleaning procedure prior to hospital). Supportive care was provided, and pt received HD while inpatient.    Of note, pt stopped taking her home myelofibrosis med Inrebic. Per Hem/onc recommendation, pt's home med Inrebic was restarted, and pt's pancytopenia improved.    Important Medication Changes and Reason:  None    Active or Pending Issues Requiring Follow-up:  - pancytopenia likely 2/2 myelofibrosis  - Mild dilatation common duct without choledocholithiasis or other obstructing lesion.    Advanced Directives:   [X] Full code  [ ] DNR  [ ] Hospice    Discharge Diagnoses:  enterogastritis 2/2 norovirus  dehydration

## 2024-10-29 NOTE — DISCHARGE NOTE NURSING/CASE MANAGEMENT/SOCIAL WORK - NSDCVIVACCINE_GEN_ALL_CORE_FT
influenza, injectable, quadrivalent, preservative free; 20-Nov-2014 12:44; Aston Ulloa (RN); [employer]; [id number]; IntraMuscular; Deltoid Left.; 0.5 milliLiter(s);   influenza, injectable, quadrivalent, preservative free; 05-Oct-2016 18:15; Rosario James (RN); [employer]; [id number]; IntraMuscular; Deltoid Left.; 0.5 milliLiter(s); VIS (VIS Published: 07-Aug-2015, VIS Presented: 05-Oct-2016);

## 2024-10-29 NOTE — PROGRESS NOTE ADULT - PROBLEM SELECTOR PLAN 1
Meet SIRS criteria, with fever, leukopenia and tachycardia, c/f bacteremia post vascular procedure w/ stent placement. S/p vanc/zosyn in ED x 1  BCx/UCx negative as 10/28    - Completed zosyn empirically 10/24-10.28   - MRSA negative  - UA/RVP/CXR neg  - Rotavirus + 10/25-> fluid resuscitation and precautions  - CBD dilation on imaging, MRCP for 10/27, notified renal of HD post MRCP on 10/28

## 2024-10-29 NOTE — PROGRESS NOTE ADULT - PROBLEM SELECTOR PROBLEM 2
Hyperkalemia
Acute on chronic anemia
Hyperkalemia
Hyperkalemia
Acute on chronic anemia

## 2024-10-29 NOTE — DISCHARGE NOTE PROVIDER - NSDCCPTREATMENT_GEN_ALL_CORE_FT
PRINCIPAL PROCEDURE  Procedure: CT abdomen pelvis  Findings and Treatment:   FINDINGS:  LOWER CHEST: Cardiomegaly.  LIVER: Within normal limits.  BILE DUCTS: Normal caliber.  GALLBLADDER: Cholecystectomy.  SPLEEN: Splenomegaly. Small linear lucency in the posterior spleen likely   an age indeterminate splenic infarct.  PANCREAS: Within normal limits.  ADRENALS: Within normal limits.  KIDNEYS/URETERS: Within normal limits.  BLADDER: Within normal limits.  REPRODUCTIVE ORGANS: Uterus and adnexa within normal limits.  BOWEL: No bowel obstruction. Appendix is normal. A few sigmoid   diverticuli.  PERITONEUM/RETROPERITONEUM: Within normal limits.  VESSELS: Gastric submucosal varices. Multiple abdominal collaterals.  LYMPH NODES: No lymphadenopathy.  ABDOMINAL WALL: Within normal limits.  BONES: Within normal limits.  IMPRESSION:  Marked splenomegaly and sequela of portal hypertension.  Small age indeterminant splenic infarct.        SECONDARY PROCEDURE  Procedure: XR chest, 1 view  Findings and Treatment: FINDINGS:  There is a Mediport overlying the right chest wall, with its tip in the   SVC/RA.  Redemonstration of left axillary and left subclavian/brachiocephalic   stents.  The lungs are clear.  There is no pleural effusion or pneumothorax.  The heart size is not well evaluated on this projection.  The visualized osseous structures demonstrate no acute pathology.  IMPRESSION:  Clear lungs.      Procedure: US abdomen RUQ  Findings and Treatment: FINDINGS:  Liver: Mildly heterogeneous echotexture. No focal abnormality is   identified.  Bile ducts: Common bile duct is mildly dilated and measures 10 mm. The   distal common bile duct is not visualized, as it is obscured by bowel   gas. No significant intrahepatic biliary dilatation is seen.  Gallbladder: Cholecystectomy. In the region of the gallbladder fossa,   there is fluid filled structure measuring 3.1 x 1.9 x 1.7 cm which   contains an echogenic focus. These findings were also noted on the prior   CT scans. Differential considerations include a gallbladder or cystic   duct remnant with stones. Negative sonographic Arguello's sign.  Pancreas: Visualized portions are within normal limits.  Spleen: Enlarged, 17.6 cm, stable compared to prior CT.  Right kidney: Atrophic, 7.0 cm. No hydronephrosis.  Ascites: None.  Aorta: Atherosclerotic changes.  IVC: Visualized portions are within normal limits.  IMPRESSION:   In the region of the gallbladder fossa, there is a fluid-filled   structure measuring 3.1 x 1.9 x 1.7 cm which contains an echogenic focus.   In this patient who is status post cholecystectomy, this may represent a   remnant gallbladder or  dilated cystic duct remnant with retained   gallstones. Similar findings were noted on the prior CT scans. No   tenderness or sonographic Arguello's sign was elicited.  The common bile duct is mildly dilated measuring up to 10 mm. No stone is   identified within the visualized portion of the duct. The distal portion   of the common bile duct was not visualized on this examination due to   interposed bowel gas; therefore, a retained common bile duct stone is not   excluded on this exam.  For further evaluation of these findings, abdominal MRI/MRCP is suggested.  Findings were discussed with Dr. Juan Carlos Shah 10/25/2024 5:12 PM by Dr. Awad with read back confirmation.      Procedure: MR MRCP  Findings and Treatment: FINDINGS:  LOWER CHEST: Within normal limits.  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  BILE DUCTS: Mild common duct dilatation to 10 mm. No choledocholithiasis.  GALLBLADDER: Cholecystectomy. Cystic duct remnant.  SPLEEN: Enlarged measuring 18 cm in span  PANCREAS: Within normal limits.  ADRENALS: Within normal limits.  KIDNEYS/URETERS: Atrophic  VISUALIZED PORTIONS:  BOWEL: Duodenal diverticulum  PERITONEUM: No ascites.  VESSELS: Within normal limits.  RETROPERITONEUM/LYMPH NODES: No lymphadenopathy.  ABDOMINAL WALL: Within normal limits.  BONES: Within normal limits.  IMPRESSION:  Mild dilatation common duct without choledocholithiasis or other   obstructing lesion.  Splenomegaly.  Evidence of iron deposition in the liver.

## 2024-10-30 ENCOUNTER — NON-APPOINTMENT (OUTPATIENT)
Age: 64
End: 2024-10-30

## 2024-11-06 NOTE — PHYSICAL THERAPY INITIAL EVALUATION ADULT - FUNCTIONAL LIMITATIONS, PT EVAL
Patient's HM shows they are overdue for Colorectal Screening.   Care Everywhere and  files searched.  No results to attach to order nor HM updated.        self-care/home management/community/leisure

## 2024-11-25 ENCOUNTER — RESULT REVIEW (OUTPATIENT)
Age: 64
End: 2024-11-25

## 2024-11-25 ENCOUNTER — APPOINTMENT (OUTPATIENT)
Dept: HEMATOLOGY ONCOLOGY | Facility: CLINIC | Age: 64
End: 2024-11-25
Payer: COMMERCIAL

## 2024-11-25 ENCOUNTER — APPOINTMENT (OUTPATIENT)
Dept: HEMATOLOGY ONCOLOGY | Facility: CLINIC | Age: 64
End: 2024-11-25

## 2024-11-25 ENCOUNTER — OUTPATIENT (OUTPATIENT)
Dept: OUTPATIENT SERVICES | Facility: HOSPITAL | Age: 64
LOS: 1 days | Discharge: HOME | End: 2024-11-25

## 2024-11-25 VITALS
SYSTOLIC BLOOD PRESSURE: 133 MMHG | RESPIRATION RATE: 16 BRPM | HEART RATE: 78 BPM | TEMPERATURE: 97.3 F | WEIGHT: 113.98 LBS | HEIGHT: 62 IN | DIASTOLIC BLOOD PRESSURE: 74 MMHG | BODY MASS INDEX: 20.97 KG/M2 | OXYGEN SATURATION: 100 %

## 2024-11-25 DIAGNOSIS — N18.6 END STAGE RENAL DISEASE: ICD-10-CM

## 2024-11-25 DIAGNOSIS — D64.9 ANEMIA, UNSPECIFIED: ICD-10-CM

## 2024-11-25 DIAGNOSIS — D45 POLYCYTHEMIA VERA: ICD-10-CM

## 2024-11-25 DIAGNOSIS — Z99.2 DEPENDENCE ON RENAL DIALYSIS: Chronic | ICD-10-CM

## 2024-11-25 DIAGNOSIS — D75.81 MYELOFIBROSIS: ICD-10-CM

## 2024-11-25 DIAGNOSIS — I77.0 ARTERIOVENOUS FISTULA, ACQUIRED: Chronic | ICD-10-CM

## 2024-11-25 DIAGNOSIS — T85.898A OTHER SPECIFIED COMPLICATION OF OTHER INTERNAL PROSTHETIC DEVICES, IMPLANTS AND GRAFTS, INITIAL ENCOUNTER: Chronic | ICD-10-CM

## 2024-11-25 LAB
BASOPHILS # BLD AUTO: 0.01 K/UL — SIGNIFICANT CHANGE UP (ref 0–0.2)
BASOPHILS NFR BLD AUTO: 0.4 % — SIGNIFICANT CHANGE UP (ref 0–2)
EOSINOPHIL # BLD AUTO: 0.04 K/UL — SIGNIFICANT CHANGE UP (ref 0–0.5)
EOSINOPHIL NFR BLD AUTO: 1.5 % — SIGNIFICANT CHANGE UP (ref 0–6)
HCT VFR BLD CALC: 31.2 % — LOW (ref 34.5–45)
HGB BLD-MCNC: 10.2 G/DL — LOW (ref 11.5–15.5)
IMM GRANULOCYTES NFR BLD AUTO: 0.4 % — SIGNIFICANT CHANGE UP (ref 0–0.9)
LYMPHOCYTES # BLD AUTO: 0.52 K/UL — LOW (ref 1–3.3)
LYMPHOCYTES # BLD AUTO: 19.6 % — SIGNIFICANT CHANGE UP (ref 13–44)
MCHC RBC-ENTMCNC: 32.7 G/DL — SIGNIFICANT CHANGE UP (ref 32–36)
MCHC RBC-ENTMCNC: 38.6 PG — HIGH (ref 27–34)
MCV RBC AUTO: 118.2 FL — HIGH (ref 80–100)
MONOCYTES # BLD AUTO: 0.16 K/UL — SIGNIFICANT CHANGE UP (ref 0–0.9)
MONOCYTES NFR BLD AUTO: 6 % — SIGNIFICANT CHANGE UP (ref 2–14)
NEUTROPHILS # BLD AUTO: 1.91 K/UL — SIGNIFICANT CHANGE UP (ref 1.8–7.4)
NEUTROPHILS NFR BLD AUTO: 72.1 % — SIGNIFICANT CHANGE UP (ref 43–77)
NRBC # BLD: 0 /100 WBCS — SIGNIFICANT CHANGE UP (ref 0–0)
PLATELET # BLD AUTO: 60 K/UL — LOW (ref 150–400)
RBC # BLD: 2.64 M/UL — LOW (ref 3.8–5.2)
RBC # FLD: 21.1 % — HIGH (ref 10.3–14.5)
RETICS #: 131.2 K/UL — HIGH (ref 25–125)
RETICS/RBC NFR: 5 % — HIGH (ref 0.5–2.5)
WBC # BLD: 2.65 K/UL — LOW (ref 3.8–10.5)
WBC # FLD AUTO: 2.65 K/UL — LOW (ref 3.8–10.5)

## 2024-11-25 PROCEDURE — 99214 OFFICE O/P EST MOD 30 MIN: CPT

## 2024-11-27 LAB
FERRITIN SERPL-MCNC: 851 NG/ML
FOLATE SERPL-MCNC: >20 NG/ML
HAPTOGLOB SERPL-MCNC: <20 MG/DL
LDH SERPL-CCNC: 158 U/L
VIT B12 SERPL-MCNC: 693 PG/ML

## 2024-12-07 NOTE — HISTORY OF PRESENT ILLNESS
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 Patient has multiple complaints today.\par \par # Fatigue: patient complains of significant fatigue after dialysis, states her renal physician told her it was likely due to large fluid shift, but she wants to make sure there is no other reason\par \par # Intermittent trouble catching breath: patient also states sometimes after dialysis she has to take many deep breaths to feel like she can catch her breath. Denies wheezing, dyspnea, cough or mucus production\par \par # Hypotension with dialysis: pt reports her BP gets low to SBP~70s during dialysis, is wondering if there is anything that can be done none

## 2025-01-02 ENCOUNTER — APPOINTMENT (OUTPATIENT)
Dept: HEMATOLOGY ONCOLOGY | Facility: CLINIC | Age: 65
End: 2025-01-02

## 2025-01-07 ENCOUNTER — APPOINTMENT (OUTPATIENT)
Dept: ENDOVASCULAR SURGERY | Facility: CLINIC | Age: 65
End: 2025-01-07
Payer: MEDICAID

## 2025-01-07 DIAGNOSIS — N18.6 END STAGE RENAL DISEASE: ICD-10-CM

## 2025-01-14 ENCOUNTER — RESULT REVIEW (OUTPATIENT)
Age: 65
End: 2025-01-14

## 2025-01-14 ENCOUNTER — APPOINTMENT (OUTPATIENT)
Dept: ENDOVASCULAR SURGERY | Facility: CLINIC | Age: 65
End: 2025-01-14
Payer: MEDICAID

## 2025-01-14 VITALS
RESPIRATION RATE: 16 BRPM | SYSTOLIC BLOOD PRESSURE: 100 MMHG | DIASTOLIC BLOOD PRESSURE: 68 MMHG | WEIGHT: 108.02 LBS | HEIGHT: 62 IN | TEMPERATURE: 97.3 F | HEART RATE: 82 BPM | BODY MASS INDEX: 19.88 KG/M2 | OXYGEN SATURATION: 100 %

## 2025-01-14 PROCEDURE — 36907Z: CUSTOM | Mod: 59

## 2025-01-14 PROCEDURE — 36902Z: CUSTOM

## 2025-01-28 ENCOUNTER — APPOINTMENT (OUTPATIENT)
Dept: INTERNAL MEDICINE | Facility: CLINIC | Age: 65
End: 2025-01-28

## 2025-01-29 ENCOUNTER — OUTPATIENT (OUTPATIENT)
Dept: OUTPATIENT SERVICES | Facility: HOSPITAL | Age: 65
LOS: 1 days | Discharge: HOME | End: 2025-01-29

## 2025-01-29 DIAGNOSIS — D45 POLYCYTHEMIA VERA: ICD-10-CM

## 2025-01-29 DIAGNOSIS — T85.898A OTHER SPECIFIED COMPLICATION OF OTHER INTERNAL PROSTHETIC DEVICES, IMPLANTS AND GRAFTS, INITIAL ENCOUNTER: Chronic | ICD-10-CM

## 2025-01-29 DIAGNOSIS — I77.0 ARTERIOVENOUS FISTULA, ACQUIRED: Chronic | ICD-10-CM

## 2025-01-29 DIAGNOSIS — D64.9 ANEMIA, UNSPECIFIED: ICD-10-CM

## 2025-01-29 DIAGNOSIS — Z99.2 DEPENDENCE ON RENAL DIALYSIS: Chronic | ICD-10-CM

## 2025-01-29 DIAGNOSIS — N18.6 END STAGE RENAL DISEASE: ICD-10-CM

## 2025-01-29 DIAGNOSIS — D75.81 MYELOFIBROSIS: ICD-10-CM

## 2025-01-29 NOTE — DIETITIAN INITIAL EVALUATION ADULT. - CALCULATED TO (CAL/KG)
Milady Goodwin  Neurosurgery  [address] [place] [address], [address]  [place] [address]  Phone: [phone]  Fax: [phone]  Follow Up Time:     Sumit Martinez  Pain Medicine  [address] [place] [address], [address]  [place] [address]  Phone: [phone]  Fax: [phone]  Follow Up Time:   
1914

## 2025-01-30 ENCOUNTER — LABORATORY RESULT (OUTPATIENT)
Age: 65
End: 2025-01-30

## 2025-01-30 ENCOUNTER — APPOINTMENT (OUTPATIENT)
Dept: HEMATOLOGY ONCOLOGY | Facility: CLINIC | Age: 65
End: 2025-01-30

## 2025-01-30 ENCOUNTER — RESULT REVIEW (OUTPATIENT)
Age: 65
End: 2025-01-30

## 2025-01-30 ENCOUNTER — NON-APPOINTMENT (OUTPATIENT)
Age: 65
End: 2025-01-30

## 2025-01-30 VITALS
OXYGEN SATURATION: 99 % | TEMPERATURE: 97.2 F | HEART RATE: 74 BPM | RESPIRATION RATE: 16 BRPM | BODY MASS INDEX: 21.25 KG/M2 | HEIGHT: 60.75 IN | DIASTOLIC BLOOD PRESSURE: 70 MMHG | WEIGHT: 111.11 LBS | SYSTOLIC BLOOD PRESSURE: 105 MMHG

## 2025-01-30 DIAGNOSIS — D45 POLYCYTHEMIA VERA: ICD-10-CM

## 2025-01-30 DIAGNOSIS — D75.81 MYELOFIBROSIS: ICD-10-CM

## 2025-01-30 LAB
BASOPHILS # BLD AUTO: 0.02 K/UL — SIGNIFICANT CHANGE UP (ref 0–0.2)
BASOPHILS NFR BLD AUTO: 0.6 % — SIGNIFICANT CHANGE UP (ref 0–2)
BILIRUB INDIRECT SERPL-MCNC: 2.2 MG/DL
EOSINOPHIL # BLD AUTO: 0.05 K/UL — SIGNIFICANT CHANGE UP (ref 0–0.5)
EOSINOPHIL NFR BLD AUTO: 1.4 % — SIGNIFICANT CHANGE UP (ref 0–6)
FERRITIN SERPL-MCNC: 997 NG/ML
HAPTOGLOB SERPL-MCNC: <20 MG/DL
HCT VFR BLD CALC: 34.5 % — SIGNIFICANT CHANGE UP (ref 34.5–45)
HGB BLD-MCNC: 11.4 G/DL — LOW (ref 11.5–15.5)
IMM GRANULOCYTES NFR BLD AUTO: 0.3 % — SIGNIFICANT CHANGE UP (ref 0–0.9)
IRON SATN MFR SERPL: 53 %
IRON SERPL-MCNC: 85 UG/DL
LDH SERPL-CCNC: 173 U/L
LYMPHOCYTES # BLD AUTO: 0.73 K/UL — LOW (ref 1–3.3)
LYMPHOCYTES # BLD AUTO: 20.2 % — SIGNIFICANT CHANGE UP (ref 13–44)
MCHC RBC-ENTMCNC: 33 G/DL — SIGNIFICANT CHANGE UP (ref 32–36)
MCHC RBC-ENTMCNC: 38.6 PG — HIGH (ref 27–34)
MCV RBC AUTO: 116.9 FL — HIGH (ref 80–100)
MONOCYTES # BLD AUTO: 0.3 K/UL — SIGNIFICANT CHANGE UP (ref 0–0.9)
MONOCYTES NFR BLD AUTO: 8.3 % — SIGNIFICANT CHANGE UP (ref 2–14)
NEUTROPHILS # BLD AUTO: 2.5 K/UL — SIGNIFICANT CHANGE UP (ref 1.8–7.4)
NEUTROPHILS NFR BLD AUTO: 69.2 % — SIGNIFICANT CHANGE UP (ref 43–77)
NRBC # BLD: 0 /100 WBCS — SIGNIFICANT CHANGE UP (ref 0–0)
NRBC BLD-RTO: 0 /100 WBCS — SIGNIFICANT CHANGE UP (ref 0–0)
PLATELET # BLD AUTO: 81 K/UL — LOW (ref 150–400)
RBC # BLD: 2.95 M/UL — LOW (ref 3.8–5.2)
RBC # FLD: 16.9 % — HIGH (ref 10.3–14.5)
RETICS #: 75.5 K/UL — SIGNIFICANT CHANGE UP (ref 25–125)
RETICS/RBC NFR: 2.6 % — HIGH (ref 0.5–2.5)
TIBC SERPL-MCNC: 158 UG/DL
UIBC SERPL-MCNC: 74 UG/DL
WBC # BLD: 3.61 K/UL — LOW (ref 3.8–10.5)
WBC # FLD AUTO: 3.61 K/UL — LOW (ref 3.8–10.5)

## 2025-02-04 ENCOUNTER — APPOINTMENT (OUTPATIENT)
Dept: INFUSION THERAPY | Facility: HOSPITAL | Age: 65
End: 2025-02-04

## 2025-02-24 ENCOUNTER — APPOINTMENT (OUTPATIENT)
Dept: INFUSION THERAPY | Facility: HOSPITAL | Age: 65
End: 2025-02-24

## 2025-02-27 ENCOUNTER — RESULT REVIEW (OUTPATIENT)
Age: 65
End: 2025-02-27

## 2025-02-27 ENCOUNTER — APPOINTMENT (OUTPATIENT)
Dept: HEMATOLOGY ONCOLOGY | Facility: CLINIC | Age: 65
End: 2025-02-27
Payer: MEDICAID

## 2025-02-27 ENCOUNTER — APPOINTMENT (OUTPATIENT)
Dept: INFUSION THERAPY | Facility: HOSPITAL | Age: 65
End: 2025-02-27

## 2025-02-27 VITALS
SYSTOLIC BLOOD PRESSURE: 92 MMHG | BODY MASS INDEX: 21.21 KG/M2 | HEART RATE: 87 BPM | OXYGEN SATURATION: 99 % | WEIGHT: 111.31 LBS | DIASTOLIC BLOOD PRESSURE: 64 MMHG | TEMPERATURE: 97.9 F | RESPIRATION RATE: 16 BRPM

## 2025-02-27 DIAGNOSIS — D75.81 MYELOFIBROSIS: ICD-10-CM

## 2025-02-27 DIAGNOSIS — D45 POLYCYTHEMIA VERA: ICD-10-CM

## 2025-02-27 LAB
ALBUMIN SERPL ELPH-MCNC: 4.6 G/DL — SIGNIFICANT CHANGE UP (ref 3.3–5)
ALP SERPL-CCNC: 126 U/L — HIGH (ref 40–120)
ALT FLD-CCNC: 9 U/L — LOW (ref 10–45)
ANION GAP SERPL CALC-SCNC: 20 MMOL/L — HIGH (ref 5–17)
AST SERPL-CCNC: 56 U/L — HIGH (ref 10–40)
BASOPHILS # BLD AUTO: 0.07 K/UL — SIGNIFICANT CHANGE UP (ref 0–0.2)
BASOPHILS NFR BLD AUTO: 0.7 % — SIGNIFICANT CHANGE UP (ref 0–2)
BILIRUB DIRECT SERPL-MCNC: 0.2 MG/DL — SIGNIFICANT CHANGE UP (ref 0–0.3)
BILIRUB INDIRECT FLD-MCNC: 3.5 MG/DL — HIGH (ref 0.2–1.2)
BILIRUB SERPL-MCNC: 3.5 MG/DL — HIGH (ref 0.2–1.2)
BILIRUB SERPL-MCNC: 3.6 MG/DL — HIGH (ref 0.2–1.2)
BUN SERPL-MCNC: 32 MG/DL — HIGH (ref 7–23)
CALCIUM SERPL-MCNC: 9.3 MG/DL — SIGNIFICANT CHANGE UP (ref 8.4–10.5)
CHLORIDE SERPL-SCNC: 90 MMOL/L — LOW (ref 96–108)
CO2 SERPL-SCNC: 22 MMOL/L — SIGNIFICANT CHANGE UP (ref 22–31)
CREAT SERPL-MCNC: 5.45 MG/DL — HIGH (ref 0.5–1.3)
EGFR: 8 ML/MIN/1.73M2 — LOW
EGFR: 8 ML/MIN/1.73M2 — LOW
EOSINOPHIL # BLD AUTO: 0.1 K/UL — SIGNIFICANT CHANGE UP (ref 0–0.5)
EOSINOPHIL NFR BLD AUTO: 1 % — SIGNIFICANT CHANGE UP (ref 0–6)
FERRITIN SERPL-MCNC: 1023 NG/ML — HIGH (ref 13–330)
GLUCOSE SERPL-MCNC: 182 MG/DL — HIGH (ref 70–99)
HAPTOGLOB SERPL-MCNC: <20 MG/DL — LOW (ref 34–200)
HCT VFR BLD CALC: 39.6 % — SIGNIFICANT CHANGE UP (ref 34.5–45)
HGB BLD-MCNC: 13.2 G/DL — SIGNIFICANT CHANGE UP (ref 11.5–15.5)
IMM GRANULOCYTES NFR BLD AUTO: 0.9 % — SIGNIFICANT CHANGE UP (ref 0–0.9)
IRON SATN MFR SERPL: 157 UG/DL — SIGNIFICANT CHANGE UP (ref 30–160)
IRON SATN MFR SERPL: 47 % — SIGNIFICANT CHANGE UP (ref 14–50)
LDH SERPL L TO P-CCNC: 432 U/L — HIGH (ref 50–242)
LYMPHOCYTES # BLD AUTO: 1 K/UL — SIGNIFICANT CHANGE UP (ref 1–3.3)
LYMPHOCYTES # BLD AUTO: 9.6 % — LOW (ref 13–44)
MCHC RBC-ENTMCNC: 33.3 G/DL — SIGNIFICANT CHANGE UP (ref 32–36)
MCHC RBC-ENTMCNC: 36.2 PG — HIGH (ref 27–34)
MCV RBC AUTO: 108.8 FL — HIGH (ref 80–100)
MONOCYTES # BLD AUTO: 0.55 K/UL — SIGNIFICANT CHANGE UP (ref 0–0.9)
MONOCYTES NFR BLD AUTO: 5.3 % — SIGNIFICANT CHANGE UP (ref 2–14)
NEUTROPHILS # BLD AUTO: 8.58 K/UL — HIGH (ref 1.8–7.4)
NEUTROPHILS NFR BLD AUTO: 82.5 % — HIGH (ref 43–77)
NRBC BLD AUTO-RTO: 0 /100 WBCS — SIGNIFICANT CHANGE UP (ref 0–0)
PLATELET # BLD AUTO: 160 K/UL — SIGNIFICANT CHANGE UP (ref 150–400)
POTASSIUM SERPL-MCNC: 5.7 MMOL/L — HIGH (ref 3.5–5.3)
POTASSIUM SERPL-SCNC: 5.7 MMOL/L — HIGH (ref 3.5–5.3)
PROT SERPL-MCNC: 8.2 G/DL — SIGNIFICANT CHANGE UP (ref 6–8.3)
RBC # BLD: 3.65 M/UL — LOW (ref 3.8–5.2)
RBC # FLD: 15.2 % — HIGH (ref 10.3–14.5)
RETICS #: 202.6 K/UL — HIGH (ref 25–125)
RETICS/RBC NFR: 5.6 % — HIGH (ref 0.5–2.5)
SODIUM SERPL-SCNC: 132 MMOL/L — LOW (ref 135–145)
TIBC SERPL-MCNC: 331 UG/DL — SIGNIFICANT CHANGE UP (ref 220–430)
UIBC SERPL-MCNC: 174 UG/DL — SIGNIFICANT CHANGE UP (ref 110–370)
WBC # BLD: 10.39 K/UL — SIGNIFICANT CHANGE UP (ref 3.8–10.5)
WBC # FLD AUTO: 10.39 K/UL — SIGNIFICANT CHANGE UP (ref 3.8–10.5)

## 2025-02-27 PROCEDURE — 99213 OFFICE O/P EST LOW 20 MIN: CPT

## 2025-03-03 ENCOUNTER — NON-APPOINTMENT (OUTPATIENT)
Age: 65
End: 2025-03-03

## 2025-03-18 NOTE — DISCHARGE NOTE ADULT - CARE PROVIDER_API CALL
Discharge instructions reviewed with pt and family member who verbalize understanding of follow up care.     Azucena De León), Internal Medicine; Nephrology  [address] [place] [address] [address] [place] [address]  Phone: [phone]  Fax: [phone]

## 2025-03-21 NOTE — ED PROVIDER NOTE - NS ED MD DISPO SPECIAL CONSIDERATION1
[FreeTextEntry1] : Ear fullness and sensation of foreign body due to Cerumen: - Removed in office today - Feels hearing is good and declined audiogram.  - F/U annually.   GERD- Antireflux recommendations were given to the patient  Low Suspicion of COVID-19

## 2025-03-31 ENCOUNTER — APPOINTMENT (OUTPATIENT)
Dept: OTOLARYNGOLOGY | Facility: CLINIC | Age: 65
End: 2025-03-31

## 2025-04-08 ENCOUNTER — APPOINTMENT (OUTPATIENT)
Dept: ENDOVASCULAR SURGERY | Facility: CLINIC | Age: 65
End: 2025-04-08
Payer: MEDICAID

## 2025-04-08 ENCOUNTER — RESULT REVIEW (OUTPATIENT)
Age: 65
End: 2025-04-08

## 2025-04-08 VITALS
WEIGHT: 110.23 LBS | RESPIRATION RATE: 18 BRPM | OXYGEN SATURATION: 99 % | DIASTOLIC BLOOD PRESSURE: 60 MMHG | HEIGHT: 62 IN | HEART RATE: 78 BPM | TEMPERATURE: 98.1 F | SYSTOLIC BLOOD PRESSURE: 90 MMHG | BODY MASS INDEX: 20.28 KG/M2

## 2025-04-08 DIAGNOSIS — N18.6 END STAGE RENAL DISEASE: ICD-10-CM

## 2025-04-08 PROCEDURE — 36907Z: CUSTOM | Mod: 59

## 2025-04-08 PROCEDURE — 36902Z: CUSTOM

## 2025-04-24 ENCOUNTER — RESULT REVIEW (OUTPATIENT)
Age: 65
End: 2025-04-24

## 2025-04-24 ENCOUNTER — APPOINTMENT (OUTPATIENT)
Dept: INFUSION THERAPY | Facility: HOSPITAL | Age: 65
End: 2025-04-24

## 2025-04-24 ENCOUNTER — APPOINTMENT (OUTPATIENT)
Dept: HEMATOLOGY ONCOLOGY | Facility: CLINIC | Age: 65
End: 2025-04-24

## 2025-04-24 VITALS
RESPIRATION RATE: 17 BRPM | DIASTOLIC BLOOD PRESSURE: 65 MMHG | HEART RATE: 80 BPM | OXYGEN SATURATION: 100 % | WEIGHT: 113.76 LBS | TEMPERATURE: 98 F | SYSTOLIC BLOOD PRESSURE: 98 MMHG | BODY MASS INDEX: 20.81 KG/M2

## 2025-04-24 DIAGNOSIS — D75.81 MYELOFIBROSIS: ICD-10-CM

## 2025-04-24 DIAGNOSIS — D45 POLYCYTHEMIA VERA: ICD-10-CM

## 2025-05-09 ENCOUNTER — APPOINTMENT (OUTPATIENT)
Dept: INTERNAL MEDICINE | Facility: CLINIC | Age: 65
End: 2025-05-09

## 2025-05-19 DIAGNOSIS — I95.0 IDIOPATHIC HYPOTENSION: ICD-10-CM

## 2025-05-19 RX ORDER — MIDODRINE HYDROCHLORIDE 5 MG/1
5 TABLET ORAL
Qty: 90 | Refills: 3 | Status: ACTIVE | COMMUNITY
Start: 2025-05-19 | End: 1900-01-01

## 2025-05-20 ENCOUNTER — APPOINTMENT (OUTPATIENT)
Dept: INTERNAL MEDICINE | Facility: CLINIC | Age: 65
End: 2025-05-20

## 2025-05-20 ENCOUNTER — OUTPATIENT (OUTPATIENT)
Dept: OUTPATIENT SERVICES | Facility: HOSPITAL | Age: 65
LOS: 1 days | End: 2025-05-20

## 2025-05-20 VITALS
WEIGHT: 114 LBS | OXYGEN SATURATION: 98 % | SYSTOLIC BLOOD PRESSURE: 108 MMHG | HEIGHT: 62 IN | BODY MASS INDEX: 20.98 KG/M2 | DIASTOLIC BLOOD PRESSURE: 60 MMHG | HEART RATE: 83 BPM

## 2025-05-20 DIAGNOSIS — R00.0 TACHYCARDIA, UNSPECIFIED: ICD-10-CM

## 2025-05-20 DIAGNOSIS — Z99.2 DEPENDENCE ON RENAL DIALYSIS: Chronic | ICD-10-CM

## 2025-05-20 DIAGNOSIS — H10.10 ACUTE ATOPIC CONJUNCTIVITIS, UNSPECIFIED EYE: ICD-10-CM

## 2025-05-20 DIAGNOSIS — I77.0 ARTERIOVENOUS FISTULA, ACQUIRED: Chronic | ICD-10-CM

## 2025-05-20 DIAGNOSIS — I10 ESSENTIAL (PRIMARY) HYPERTENSION: ICD-10-CM

## 2025-05-20 DIAGNOSIS — J30.89 OTHER ALLERGIC RHINITIS: ICD-10-CM

## 2025-05-20 PROCEDURE — 99213 OFFICE O/P EST LOW 20 MIN: CPT | Mod: GE

## 2025-05-20 PROCEDURE — G0463: CPT

## 2025-05-20 RX ORDER — FLUTICASONE PROPIONATE 50 UG/1
50 SPRAY, METERED NASAL DAILY
Qty: 1 | Refills: 1 | Status: ACTIVE | COMMUNITY
Start: 2025-05-20 | End: 1900-01-01

## 2025-05-20 RX ORDER — OLOPATADINE HCL 1 MG/ML
0.1 SOLUTION/ DROPS OPHTHALMIC TWICE DAILY
Qty: 1 | Refills: 1 | Status: ACTIVE | COMMUNITY
Start: 2025-05-20 | End: 1900-01-01

## 2025-05-20 RX ORDER — FEXOFENADINE HCL 180 MG/1
180 TABLET, FILM COATED ORAL DAILY
Qty: 60 | Refills: 2 | Status: ACTIVE | COMMUNITY
Start: 2025-05-20 | End: 1900-01-01

## 2025-05-30 RX ORDER — FAMOTIDINE 20 MG/1
20 TABLET, FILM COATED ORAL DAILY
Qty: 90 | Refills: 2 | Status: ACTIVE | COMMUNITY
Start: 2025-05-19 | End: 1900-01-01

## 2025-06-10 ENCOUNTER — RESULT REVIEW (OUTPATIENT)
Age: 65
End: 2025-06-10

## 2025-06-10 ENCOUNTER — APPOINTMENT (OUTPATIENT)
Dept: ENDOVASCULAR SURGERY | Facility: CLINIC | Age: 65
End: 2025-06-10
Payer: COMMERCIAL

## 2025-06-10 VITALS
OXYGEN SATURATION: 100 % | DIASTOLIC BLOOD PRESSURE: 59 MMHG | SYSTOLIC BLOOD PRESSURE: 86 MMHG | HEIGHT: 62 IN | HEART RATE: 75 BPM | WEIGHT: 114 LBS | RESPIRATION RATE: 19 BRPM | BODY MASS INDEX: 20.98 KG/M2 | TEMPERATURE: 98 F

## 2025-06-10 PROCEDURE — 36902Z: CUSTOM

## 2025-06-10 PROCEDURE — 36907Z: CUSTOM | Mod: 59

## 2025-06-10 RX ORDER — CALCIUM ACETATE 667 MG/1
667 CAPSULE ORAL 3 TIMES DAILY
Qty: 1080 | Refills: 3 | Status: ACTIVE | COMMUNITY
Start: 2025-06-10 | End: 1900-01-01

## 2025-07-16 NOTE — ED ADULT NURSE NOTE - SUICIDE SCREENING QUESTION 3
Assumed patient care at 1930  Alert  and oriented x 4.  at bedside.  Vital signs are stable.   Currently Afib on tele. HR 90's  Denies palpitations, or chest pain at this time, Right wrist incision now stable, bruised, Vascular checks WNL's  Denies abdominal  pain or other discomfort at this time.  Fall precautions in progress, daily wt. Incision , Intake and output monitoring in effect.  Problem: SAFETY ADULT - FALL  Goal: Free from fall injury  Description: INTERVENTIONS:  - Assess pt frequently for physical needs  - Identify cognitive and physical deficits and behaviors that affect risk of falls.  - [place] fall precautions as indicated by assessment.  - Educate pt/family on patient safety including physical limitations  - Instruct pt to call for assistance with activity based on assessment  - Modify environment to reduce risk of injury  - Provide assistive devices as appropriate  - Consider OT/PT consult to assist with strengthening/mobility  - Encourage toileting schedule  7/13/2025 0749 by Anayeli Jenkins RN  Outcome: Progressing  7/12/2025 1836 by Anayeli Jenkins RN  Outcome: Progressing  7/12/2025 1836 by Anayeli Jenkins RN  Outcome: Progressing     Problem: CARDIOVASCULAR - ADULT  Goal: Maintains optimal cardiac output and hemodynamic stability  Description: INTERVENTIONS:  - Monitor vital signs, rhythm, and trends  - Monitor for bleeding, hypotension and signs of decreased cardiac output  - Evaluate effectiveness of vasoactive medications to optimize hemodynamic stability  - Monitor arterial and/or venous puncture sites for bleeding and/or hematoma  - Assess quality of pulses, skin color and temperature  - Assess for signs of decreased coronary artery perfusion - ex. Angina  - Evaluate fluid balance, assess for edema, trend weights  7/13/2025 0749 by Anayeli Jenkins, RN  Outcome: Progressing  7/12/2025 1836 by Anayeli Jenkins RN  Outcome: Progressing  7/12/2025 1836  by Anayeli Jenkins RN  Outcome: Progressing  Goal: Absence of cardiac arrhythmias or at baseline  Description: INTERVENTIONS:  - Continuous cardiac monitoring, monitor vital signs, obtain 12 lead EKG if indicated  - Evaluate effectiveness of antiarrhythmic and heart rate control medications as ordered  - Initiate emergency measures for life threatening arrhythmias  - Monitor electrolytes and administer replacement therapy as ordered  7/13/2025 0749 by Anayeli Jenkins RN  Outcome: Progressing  7/12/2025 1836 by Anayeli Jenkins RN  Outcome: Progressing  7/12/2025 1836 by Anayeli Jenkins RN  Outcome: Progressing     Problem: RESPIRATORY - ADULT  Goal: Achieves optimal ventilation and oxygenation  Description: INTERVENTIONS:  - Assess for changes in respiratory status  - Assess for changes in mentation and behavior  - Position to facilitate oxygenation and minimize respiratory effort  - Oxygen supplementation based on oxygen saturation or ABGs  - Provide Smoking Cessation handout, if applicable  - Encourage broncho-pulmonary hygiene including cough, deep breathe, Incentive Spirometry  - Assess the need for suctioning and perform as needed  - Assess and instruct to report SOB or any respiratory difficulty  - Respiratory Therapy support as indicated  - Manage/alleviate anxiety  - Monitor for signs/symptoms of CO2 retention  7/13/2025 0749 by Anayeli Jenkins RN  Outcome: Progressing  7/12/2025 1836 by Anayeli Jenkins RN  Outcome: Progressing  7/12/2025 1836 by Anayeli Jenkins RN  Outcome: Progressing     Problem: METABOLIC/FLUID AND ELECTROLYTES - ADULT  Goal: Electrolytes maintained within normal limits  Description: INTERVENTIONS:  - Monitor labs and rhythm and assess patient for signs and symptoms of electrolyte imbalances  - Administer electrolyte replacement as ordered  - Monitor response to electrolyte replacements, including rhythm and repeat lab results as appropriate  - Fluid  restriction as ordered  - Instruct patient on fluid and nutrition restrictions as appropriate  7/13/2025 0749 by Anayeli Jenkins, RN  Outcome: Progressing  7/12/2025 1836 by Anayeli Jenkins, RN  Outcome: Progressing  7/12/2025 1836 by Anayeli Jenkins, RN  Outcome: Progressing      No

## 2025-07-31 NOTE — PHYSICAL THERAPY INITIAL EVALUATION ADULT - NS ASR RISK AREAS PT EVAL
Onset: today    Location / description: fell in tub on right hip. Pain radiates to right leg. able to ambulate. Fell in a way so that did not hit head    Precipitating Factors: supposed to have surgery on neck 8/6    Pain Scale (0 - 10), 10 highest: 7/10 right hip radiates down right leg    What improves/worsens symptoms: no     Symptom specific medications: no     Temperature (route and time): no    Recent visits (last 3-4 weeks) for same reason or recent surgery:  recently in hospital     Reproductive History   LMP: N/A    Contraception: N/A    Pregnant:  N/A    Breastfeeding:  N/A    PLAN:  Go to the Emergency Department    Patient/Caller does not plan to follow recommendations. The patient/caller was advised that their symptoms are serious and if they refuse the disposition, it could result in delayed care and serious health consequences. If their condition worsens, go to ER or call 911 immediately. Patient/Caller states they plan to Patients Plan: monitor symptoms at home and will call back if questions or worsens.       _______________________________________               Reason for Disposition   Followed a hip injury   SEVERE pain (e.g., excruciating)    Protocols used: Hip Pain-A-OH, Hip Injury-A-OH     fall

## (undated) DEVICE — SUCTION YANKAUER NO CONTROL VENT

## (undated) DEVICE — SNARE POLYP JUMB OVL 30MM 240CM

## (undated) DEVICE — SYR ALLIANCE II INFLATION 60ML

## (undated) DEVICE — TUBING TRUWAVE PRESSURE MALE/FEMALE 12"

## (undated) DEVICE — FORCEP RADIAL JAW 4 JUMBO 2.8MM 3.2MM 240CM ORANGE DISP

## (undated) DEVICE — FOLEY HOLDER STATLOCK 2 WAY ADULT

## (undated) DEVICE — SPHINCTEROTOME CLEVERCUT WIRE 25MM  2.8MM X 170CM

## (undated) DEVICE — GOWN TRIMAX LG

## (undated) DEVICE — BITE BLOCK ADULT 20 X 27MM (GREEN)

## (undated) DEVICE — PREP CHLORAPREP HI-LITE ORANGE 26ML

## (undated) DEVICE — BLADE SCALPEL SAFETYLOCK #10

## (undated) DEVICE — CLAMP BX HOT RAD JAW 3

## (undated) DEVICE — PACK IV START WITH CHG

## (undated) DEVICE — BRUSH COLONOSCOPY CYTOLOGY

## (undated) DEVICE — CATH IV SAFE BC 20G X 1.16" (PINK)

## (undated) DEVICE — TUBING IV SET GRAVITY 3Y 100" MACRO

## (undated) DEVICE — SOL INJ NS 0.9% 500ML 2 PORT

## (undated) DEVICE — SNARE POLYP SENS SM 13MM 240CM

## (undated) DEVICE — GLV 8.5 PROTEXIS (WHITE)

## (undated) DEVICE — SOL IRR POUR H2O 250ML

## (undated) DEVICE — ELCTR GROUNDING PAD ADULT COVIDIEN

## (undated) DEVICE — GLV 6.5 PROTEXIS (WHITE)

## (undated) DEVICE — CATH IV SAFE BC 22G X 1" (BLUE)

## (undated) DEVICE — SOL IRR POUR NS 0.9% 500ML

## (undated) DEVICE — SENSOR O2 FINGER ADULT

## (undated) DEVICE — PAPIL BILRTH II 6-5FRX0.035IN

## (undated) DEVICE — SYR LUER LOK 20CC

## (undated) DEVICE — SPECIMEN CONTAINER 100ML

## (undated) DEVICE — MARKING PEN W RULER

## (undated) DEVICE — NDL INJ SCLERO INTERJECT 23G

## (undated) DEVICE — GLV 7.5 PROTEXIS (WHITE)

## (undated) DEVICE — BALLOON US ENDO

## (undated) DEVICE — DRAPE MAYO STAND 30"

## (undated) DEVICE — WARMING BLANKET UPPER ADULT

## (undated) DEVICE — NDL INJ SCLERO INTERJECT 25G

## (undated) DEVICE — MEDICATION LABELS W MARKER

## (undated) DEVICE — STAPLER SKIN VISI-STAT 35 WIDE

## (undated) DEVICE — LITHOTRIPY BASKET TRAPEZOID 2.5CM

## (undated) DEVICE — POSITIONER FOAM EGG CRATE ULNAR 2PCS (PINK)

## (undated) DEVICE — IRRIGATOR BIO SHIELD

## (undated) DEVICE — PACK MAJOR ABDOMINAL SUPINE

## (undated) DEVICE — TUBING SUCTION 20FT

## (undated) DEVICE — SYR LUER LOK 50CC

## (undated) DEVICE — TUBING SUCTION CONN 6FT STERILE

## (undated) DEVICE — BRUSH CYTO RAP EXCHG 3MM

## (undated) DEVICE — DRAIN RESERVOIR FOR JACKSON PRATT 100CC CARDINAL

## (undated) DEVICE — DRSG CURITY GAUZE SPONGE 4 X 4" 12-PLY

## (undated) DEVICE — BIOPSY FORCEP RADIAL JAW 4 STANDARD WITH NEEDLE

## (undated) DEVICE — BLADE SCALPEL SAFETYLOCK #15

## (undated) DEVICE — SNARE POLYP MINI ACCUSNR 1.5 X 3CM

## (undated) DEVICE — SYR LUER LOK 30CC

## (undated) DEVICE — DRAIN JACKSON PRATT 10MM FLAT FULL NO TROCAR

## (undated) DEVICE — FOLEY TRAY 16FR 5CC LTX UMETER CLOSED

## (undated) DEVICE — DRAPE TOWEL BLUE 17" X 24"

## (undated) DEVICE — POLY TRAP ETRAP

## (undated) DEVICE — LOK DVC RX AND BIOPSY

## (undated) DEVICE — DRAPE INSTRUMENT POUCH 6.75" X 11"

## (undated) DEVICE — GLV 8 PROTEXIS (WHITE)

## (undated) DEVICE — GLV 7 PROTEXIS (WHITE)

## (undated) DEVICE — POSITIONER FOAM HEAD CRADLE (PINK)

## (undated) DEVICE — DRSG OPSITE 13.75 X 4"

## (undated) DEVICE — DRSG TEGADERM 6"X8"

## (undated) DEVICE — VENODYNE/SCD SLEEVE CALF LARGE